# Patient Record
Sex: MALE | Race: WHITE | Employment: OTHER | URBAN - METROPOLITAN AREA
[De-identification: names, ages, dates, MRNs, and addresses within clinical notes are randomized per-mention and may not be internally consistent; named-entity substitution may affect disease eponyms.]

---

## 2017-07-23 ENCOUNTER — APPOINTMENT (OUTPATIENT)
Dept: ULTRASOUND IMAGING | Age: 62
DRG: 445 | End: 2017-07-23
Attending: PHYSICIAN ASSISTANT
Payer: COMMERCIAL

## 2017-07-23 ENCOUNTER — HOSPITAL ENCOUNTER (INPATIENT)
Age: 62
LOS: 3 days | Discharge: HOME | DRG: 445 | End: 2017-07-26
Attending: EMERGENCY MEDICINE | Admitting: INTERNAL MEDICINE
Payer: COMMERCIAL

## 2017-07-23 DIAGNOSIS — E80.6 HYPERBILIRUBINEMIA: ICD-10-CM

## 2017-07-23 DIAGNOSIS — K82.8 GALLBLADDER SLUDGE: Primary | ICD-10-CM

## 2017-07-23 PROBLEM — K83.1 OBSTRUCTIVE JAUNDICE: Status: ACTIVE | Noted: 2017-07-23

## 2017-07-23 LAB
ALBUMIN SERPL BCP-MCNC: 3.2 G/DL (ref 3.5–5)
ALBUMIN/GLOB SERPL: 1 {RATIO} (ref 1.1–2.2)
ALP SERPL-CCNC: 375 U/L (ref 45–117)
ALT SERPL-CCNC: 199 U/L (ref 12–78)
AMMONIA PLAS-SCNC: 12 UMOL/L
ANION GAP BLD CALC-SCNC: 11 MMOL/L (ref 5–15)
APPEARANCE UR: ABNORMAL
AST SERPL W P-5'-P-CCNC: 65 U/L (ref 15–37)
BACTERIA URNS QL MICRO: ABNORMAL /HPF
BASOPHILS # BLD AUTO: 0 K/UL (ref 0–0.1)
BASOPHILS # BLD: 1 % (ref 0–1)
BILIRUB SERPL-MCNC: 14 MG/DL (ref 0.2–1)
BILIRUB UR QL CFM: POSITIVE
BUN SERPL-MCNC: 16 MG/DL (ref 6–20)
BUN/CREAT SERPL: 15 (ref 12–20)
CALCIUM SERPL-MCNC: 8.5 MG/DL (ref 8.5–10.1)
CHLORIDE SERPL-SCNC: 101 MMOL/L (ref 97–108)
CO2 SERPL-SCNC: 25 MMOL/L (ref 21–32)
COLOR UR: ABNORMAL
CREAT SERPL-MCNC: 1.09 MG/DL (ref 0.7–1.3)
EOSINOPHIL # BLD: 0.4 K/UL (ref 0–0.4)
EOSINOPHIL NFR BLD: 6 % (ref 0–7)
EPITH CASTS URNS QL MICRO: ABNORMAL /LPF
ERYTHROCYTE [DISTWIDTH] IN BLOOD BY AUTOMATED COUNT: 14.8 % (ref 11.5–14.5)
GLOBULIN SER CALC-MCNC: 3.3 G/DL (ref 2–4)
GLUCOSE SERPL-MCNC: 126 MG/DL (ref 65–100)
GLUCOSE UR STRIP.AUTO-MCNC: NEGATIVE MG/DL
HCT VFR BLD AUTO: 46.7 % (ref 36.6–50.3)
HGB BLD-MCNC: 16.3 G/DL (ref 12.1–17)
HGB UR QL STRIP: ABNORMAL
KETONES UR QL STRIP.AUTO: ABNORMAL MG/DL
LEUKOCYTE ESTERASE UR QL STRIP.AUTO: ABNORMAL
LIPASE SERPL-CCNC: 251 U/L (ref 73–393)
LYMPHOCYTES # BLD AUTO: 14 % (ref 12–49)
LYMPHOCYTES # BLD: 1 K/UL (ref 0.8–3.5)
MCH RBC QN AUTO: 32.1 PG (ref 26–34)
MCHC RBC AUTO-ENTMCNC: 34.9 G/DL (ref 30–36.5)
MCV RBC AUTO: 91.9 FL (ref 80–99)
MONOCYTES # BLD: 0.7 K/UL (ref 0–1)
MONOCYTES NFR BLD AUTO: 10 % (ref 5–13)
NEUTS SEG # BLD: 4.7 K/UL (ref 1.8–8)
NEUTS SEG NFR BLD AUTO: 69 % (ref 32–75)
NITRITE UR QL STRIP.AUTO: NEGATIVE
PH UR STRIP: 5.5 [PH] (ref 5–8)
PLATELET # BLD AUTO: 174 K/UL (ref 150–400)
POTASSIUM SERPL-SCNC: 3.4 MMOL/L (ref 3.5–5.1)
PROT SERPL-MCNC: 6.5 G/DL (ref 6.4–8.2)
PROT UR STRIP-MCNC: ABNORMAL MG/DL
RBC # BLD AUTO: 5.08 M/UL (ref 4.1–5.7)
RBC #/AREA URNS HPF: ABNORMAL /HPF (ref 0–5)
SODIUM SERPL-SCNC: 137 MMOL/L (ref 136–145)
SP GR UR REFRACTOMETRY: 1.02 (ref 1–1.03)
UA: UC IF INDICATED,UAUC: ABNORMAL
UROBILINOGEN UR QL STRIP.AUTO: 1 EU/DL (ref 0.2–1)
WBC # BLD AUTO: 6.8 K/UL (ref 4.1–11.1)
WBC URNS QL MICRO: ABNORMAL /HPF (ref 0–4)

## 2017-07-23 PROCEDURE — 81001 URINALYSIS AUTO W/SCOPE: CPT | Performed by: PHYSICIAN ASSISTANT

## 2017-07-23 PROCEDURE — 65270000029 HC RM PRIVATE

## 2017-07-23 PROCEDURE — 74011000250 HC RX REV CODE- 250: Performed by: PHYSICIAN ASSISTANT

## 2017-07-23 PROCEDURE — 99285 EMERGENCY DEPT VISIT HI MDM: CPT

## 2017-07-23 PROCEDURE — 96361 HYDRATE IV INFUSION ADD-ON: CPT

## 2017-07-23 PROCEDURE — 74011250636 HC RX REV CODE- 250/636: Performed by: PHYSICIAN ASSISTANT

## 2017-07-23 PROCEDURE — 83690 ASSAY OF LIPASE: CPT | Performed by: PHYSICIAN ASSISTANT

## 2017-07-23 PROCEDURE — 36415 COLL VENOUS BLD VENIPUNCTURE: CPT | Performed by: PHYSICIAN ASSISTANT

## 2017-07-23 PROCEDURE — 76700 US EXAM ABDOM COMPLETE: CPT

## 2017-07-23 PROCEDURE — 80053 COMPREHEN METABOLIC PANEL: CPT | Performed by: PHYSICIAN ASSISTANT

## 2017-07-23 PROCEDURE — 87086 URINE CULTURE/COLONY COUNT: CPT | Performed by: PHYSICIAN ASSISTANT

## 2017-07-23 PROCEDURE — 96374 THER/PROPH/DIAG INJ IV PUSH: CPT

## 2017-07-23 PROCEDURE — 82140 ASSAY OF AMMONIA: CPT | Performed by: PHYSICIAN ASSISTANT

## 2017-07-23 PROCEDURE — 85025 COMPLETE CBC W/AUTO DIFF WBC: CPT | Performed by: PHYSICIAN ASSISTANT

## 2017-07-23 PROCEDURE — 74011250636 HC RX REV CODE- 250/636: Performed by: INTERNAL MEDICINE

## 2017-07-23 RX ORDER — LEVOFLOXACIN 5 MG/ML
750 INJECTION, SOLUTION INTRAVENOUS ONCE
Status: COMPLETED | OUTPATIENT
Start: 2017-07-23 | End: 2017-07-24

## 2017-07-23 RX ORDER — GLUCOSAMINE SULFATE 1500 MG
5000 POWDER IN PACKET (EA) ORAL DAILY
COMMUNITY
End: 2018-07-17

## 2017-07-23 RX ORDER — DIPHENHYDRAMINE HYDROCHLORIDE 50 MG/ML
25 INJECTION, SOLUTION INTRAMUSCULAR; INTRAVENOUS
Status: COMPLETED | OUTPATIENT
Start: 2017-07-23 | End: 2017-07-23

## 2017-07-23 RX ORDER — CIPROFLOXACIN 2 MG/ML
400 INJECTION, SOLUTION INTRAVENOUS
Status: DISCONTINUED | OUTPATIENT
Start: 2017-07-23 | End: 2017-07-23

## 2017-07-23 RX ORDER — RAMIPRIL 10 MG/1
10 CAPSULE ORAL DAILY
COMMUNITY
End: 2017-08-31

## 2017-07-23 RX ORDER — METRONIDAZOLE 500 MG/100ML
500 INJECTION, SOLUTION INTRAVENOUS
Status: COMPLETED | OUTPATIENT
Start: 2017-07-23 | End: 2017-07-23

## 2017-07-23 RX ORDER — DIPHENHYDRAMINE HYDROCHLORIDE 50 MG/ML
25 INJECTION, SOLUTION INTRAMUSCULAR; INTRAVENOUS
Status: DISCONTINUED | OUTPATIENT
Start: 2017-07-23 | End: 2017-07-26 | Stop reason: HOSPADM

## 2017-07-23 RX ORDER — GABAPENTIN 300 MG/1
900 CAPSULE ORAL 3 TIMES DAILY
Status: ON HOLD | COMMUNITY
End: 2017-07-24 | Stop reason: DRUGHIGH

## 2017-07-23 RX ORDER — ONDANSETRON 2 MG/ML
4 INJECTION INTRAMUSCULAR; INTRAVENOUS
Status: DISCONTINUED | OUTPATIENT
Start: 2017-07-23 | End: 2017-07-26 | Stop reason: HOSPADM

## 2017-07-23 RX ORDER — LEVOFLOXACIN 5 MG/ML
750 INJECTION, SOLUTION INTRAVENOUS EVERY 24 HOURS
Status: DISCONTINUED | OUTPATIENT
Start: 2017-07-24 | End: 2017-07-24

## 2017-07-23 RX ORDER — CYANOCOBALAMIN 1000 UG/ML
1000 INJECTION, SOLUTION INTRAMUSCULAR; SUBCUTANEOUS ONCE
COMMUNITY
End: 2017-10-26 | Stop reason: SDUPTHER

## 2017-07-23 RX ORDER — LEVOFLOXACIN 5 MG/ML
500 INJECTION, SOLUTION INTRAVENOUS EVERY 24 HOURS
Status: DISCONTINUED | OUTPATIENT
Start: 2017-07-24 | End: 2017-07-23

## 2017-07-23 RX ORDER — LEVOFLOXACIN 500 MG/1
500 TABLET, FILM COATED ORAL DAILY
COMMUNITY
End: 2017-07-26

## 2017-07-23 RX ORDER — CETIRIZINE HCL 10 MG
10 TABLET ORAL DAILY
Status: DISCONTINUED | OUTPATIENT
Start: 2017-07-24 | End: 2017-07-26 | Stop reason: HOSPADM

## 2017-07-23 RX ORDER — SODIUM CHLORIDE AND POTASSIUM CHLORIDE .9; .15 G/100ML; G/100ML
SOLUTION INTRAVENOUS CONTINUOUS
Status: DISCONTINUED | OUTPATIENT
Start: 2017-07-23 | End: 2017-07-26

## 2017-07-23 RX ADMIN — METRONIDAZOLE 500 MG: 500 INJECTION, SOLUTION INTRAVENOUS at 22:18

## 2017-07-23 RX ADMIN — DIPHENHYDRAMINE HYDROCHLORIDE 25 MG: 50 INJECTION, SOLUTION INTRAMUSCULAR; INTRAVENOUS at 23:18

## 2017-07-23 RX ADMIN — SODIUM CHLORIDE 1000 ML: 900 INJECTION, SOLUTION INTRAVENOUS at 18:39

## 2017-07-23 RX ADMIN — DIPHENHYDRAMINE HYDROCHLORIDE 25 MG: 50 INJECTION, SOLUTION INTRAMUSCULAR; INTRAVENOUS at 18:53

## 2017-07-23 NOTE — IP AVS SNAPSHOT
Summary of Care Report The Summary of Care report has been created to help improve care coordination. Users with access to [employer] or [employer] [address] (Web-based application) may access additional patient information including the Discharge Summary. If you are not currently a [address] user and need more information, please call the number listed below in the [place] [address] section and ask to be connected with Medical Records. Facility Information Name Address Phone [address] [address] [phone] Patient Information Patient Name Sex  Vera Aguilar ([id number]) Male 1955 Discharge Information Admitting Provider Service Area Unit Mercedes Rush MD / [phone] [address] [facility] 2 General Surgery / [phone] Discharge Provider Discharge Date/Time Discharge Disposition Destination (none) 2017 (Pending) AHR (none) Patient Language Language ENGLISH [13] Hospital Problems as of 2017  Reviewed: 2017  3:00 PM by Obi Peña MD  
  
  
  
 Class Noted - Resolved Last Modified POA Active Problems Obstructive jaundice  2017 - Present 2017 by Mercedes Rush MD Yes Entered by Mercedes Rush MD  
  Common bile duct (CBD) obstruction  2017 - Present 2017 by Vargas Barbosa MD Yes Entered by Vargas Barbosa MD  
  UTI (urinary tract infection)  2017 - Present 2017 by Vargas Barbosa MD Clinically Undetermined Entered by Vargas Barbosa MD  
  
Non-Hospital Problems as of 2017  Reviewed: 2017  3:00 PM by Obi Peña MD  
 None You are allergic to the following Allergen Reactions Pcn (Penicillins) Other (comments) Pt stated \"always been told PCN\" Current Discharge Medication List  
  
 START taking these medications Dose & Instructions Dispensing Information Comments  
 hydrOXYzine HCl 50 mg tablet Commonly known as:  ATARAX Dose:  50 mg Take 1 Tab by mouth three (3) times daily as needed for Itching for up to 10 days. Quantity:  30 Tab Refills:  0 CONTINUE these medications which have CHANGED Dose & Instructions Dispensing Information Comments  
 gabapentin 300 mg capsule Commonly known as:  NEURONTIN What changed:  Another medication with the same name was removed. Continue taking this medication, and follow the directions you see here. Dose:  900 mg Take 900 mg by mouth two (2) times a day. 3 x 300mg caps (900mg) twice daily Refills:  0 CONTINUE these medications which have NOT CHANGED Dose & Instructions Dispensing Information Comments  
 ramipril 10 mg capsule Commonly known as:  ALTACE Dose:  10 mg Take 10 mg by mouth daily. Refills:  0  
   
 VITAMIN B-12 1,000 mcg/mL injection Generic drug:  cyanocobalamin Dose:  1000 mcg  
1,000 mcg by IntraMUSCular route once. Indications: VITAMIN B12 DEFICIENCY, Twice a month Refills:  0  
   
 VITAMIN D3 1,000 unit Cap Generic drug:  cholecalciferol Dose:  1000 Units Take 1,000 Units by mouth daily. Refills:  0 STOP taking these medications Comments  
 levoFLOXacin 500 mg tablet Commonly known as:  [facility] Information ID Date/Time Status Primary Surgeon All Procedures Location [id number] 7/25/2017 Filemon Arriaza [address] Sloane Thomson MD ENDOSCOPIC RETROGRADE CHOLANGIOPANCREATOGRAPHY, BALLOON DILATATION,SPINCTEROTOMY, CYTOLOGY BRUSHINGS OF COMMON BILE DUCT, COMMON BILE DUCT STENT INSERTION [facility] MAIN OR Follow-up Information Follow up With Details Comments Contact Info Coy Sparrow MD In 1 week LFT recheck and biopsy results [address] [place] [address] 
[phone] Discharge Instructions HOSPITALIST DISCHARGE INSTRUCTIONS 
 
NAME: Anjum Monreal :  1955 MRN:  [id number] Date/Time:  2017 12:02 PM 
 
ADMIT DATE: 2017 DISCHARGE DATE: 2017 DISCHARGE DIAGNOSIS: 
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 
S/P ablation for Afib Sjogren's disease Hyponatremia- mild, Eat some salt in diet for next few days then go back to salt restricted diet 
  
 
Active Problems: 
  Obstructive jaundice (2017) Common bile duct (CBD) obstruction (2017) UTI (urinary tract infection) (2017) MEDICATIONS: 
As per medication reconciliation  list 
· It is important that you take the medication exactly as they are prescribed. · Keep your medication in the bottles provided by the pharmacist and keep a list of the medication names, dosages, and times to be taken in your wallet. · Do not take other medications without consulting your doctor. Pain Management: per above medications What to do at [facility] Recommended diet:  Cardiac Diet, Eat some salt in diet for next few days then go back to salt restricted diet Recommended activity: Activity as tolerated If you have questions regarding the hospital related prescriptions or hospital related issues please call Can Nogueira at . If you experience any of the following symptoms then please call your primary care physician or return to the emergency room if you cannot get hold of your doctor: 
Fever, chills, nausea, vomiting, diarrhea, change in mentation, falling, bleeding, shortness of breath, Follow Up: 
Dr Greyson Echeverria (Gastroenterologist) in 1 week for repeat LFTs & results of Biopsy as recommended Information obtained by : 
I understand that if any problems occur once I am at home I am to contact my physician. I understand and acknowledge receipt of the instructions indicated above. Physician's or R.N.'s Signature                                                                  Date/Time Patient or Representative Signature                                                          Date/Time Chart Review Routing History No Routing History on File

## 2017-07-23 NOTE — ED NOTES
Patient itching and red, raised areas of erythema present on arm where tourniquet was placed. PA aware. Benadryl ordered.

## 2017-07-23 NOTE — ED PROVIDER NOTES
HPI Comments: Haydee Gonsalez is a 62 y.o. male with PMHx significant for sjogren's who presents ambulatory to [facility] ED with cc of acute onset jaundiced skin x this morning. Pt reports an episode of abdominal pain about a month ago, which he treated with OTC analgesics and TUMS with relief. Pt reports his abdominal pain completely resolved but his urine had an orange tinge to it and his BM were light brown and loose. Pt did go get evaluated by his PCP the day after his symptoms onset and was diagnosed with a cold. No labs or UA was done at that time. Pt went to see his PCP again 2 days ago and diagnosed with a UTI after a UA. He was discharged with a prescription for Levaquin which he has been taking as instructed. Pt was scheduled to follow up with his PCP tomorrow but wife brought him into the ED for his jaundiced skin. Pt's wife reports he has a hx of Sjogren's and has been taking benadryl x week. Pt notes he is usually on zyrtec for his Sjogren's. Pt has been having BM's everyday. Pt specifically denies any nausea, vomiting, abdominal pain, dysuria, hematuria, urinary frequency. PCP: No primary care provider on file. Social Hx: - tobacco (-), -EtOH (-), - illicit drugs (-). There are no other complaints, changes or physical findings at this time. The history is provided by the patient. No  was used. No past medical history on file. No past surgical history on file. No family history on file. Social History     Social History    Marital status: N/A     Spouse name: N/A    Number of children: N/A    Years of education: N/A     Occupational History    Not on file.      Social History Main Topics    Smoking status: Not on file    Smokeless tobacco: Not on file    Alcohol use Not on file    Drug use: Not on file    Sexual activity: Not on file     Other Topics Concern    Not on file     Social History Narrative         ALLERGIES: Pcn [penicillins]    Review of Systems   Constitutional: Negative. HENT: Negative. Respiratory: Negative. Cardiovascular: Negative. Gastrointestinal: Negative for abdominal pain, nausea and vomiting. Positive for loose light brown stool. Genitourinary: Negative for dysuria, frequency and hematuria. Positive for different color urine   Musculoskeletal: Negative. Skin: Positive for color change (jaundiced). Neurological: Negative. All other systems reviewed and are negative. Patient Vitals for the past 12 hrs:   Temp Pulse Resp BP SpO2   07/23/17 2000 - - - 132/77 96 %   07/23/17 1946 - - - - 96 %   07/23/17 1945 - - - 142/81 -   07/23/17 1930 - - - 128/77 96 %   07/23/17 1900 - - - 134/72 95 %   07/23/17 1845 - - - 131/84 97 %   07/23/17 1750 98 °F (36.7 °C) 87 16 (!) 165/96 97 %     Physical Exam   Constitutional: He is oriented to person, place, and time. Vital signs are normal. He appears well-developed and well-nourished. No distress. Jaundiced appearing 63 yo  male   HENT:   Head: Normocephalic and atraumatic. Eyes: Conjunctivae are normal. Right eye exhibits no discharge. Left eye exhibits no discharge. Scleral icterus is present. Neck: Normal range of motion. Neck supple. Cardiovascular: Normal rate, regular rhythm and normal heart sounds. No murmur heard. Pulmonary/Chest: Effort normal and breath sounds normal. No respiratory distress. He has no wheezes. Abdominal: Soft. Normal appearance and bowel sounds are normal. He exhibits no distension. There is tenderness in the right upper quadrant. There is no rebound and no guarding. Neurological: He is alert and oriented to person, place, and time. Skin: Skin is warm and dry. He is not diaphoretic. Psychiatric: He has a normal mood and affect. His behavior is normal.   Nursing note and vitals reviewed.        MDM  Number of Diagnoses or Management Options  Diagnosis management comments: DDx: Pancreatitis, Hepatitis, Gall bladder disease, Gall stones, Liver failure, UTI. Amount and/or Complexity of Data Reviewed  Clinical lab tests: ordered and reviewed  Review and summarize past medical records: yes    Patient Progress  Patient progress: stable    ED Course       Procedures    PROGRESS NOTE:  8:35 PM  Updated pt on his lab results and let him know his Bilirubin was positive. Pt still declines any pain medication at this point. Written by Josey Henderson ED Scribe, as dictated by Camacho Jacobo .    PROGRESS NOTE:  9:30 PM  Since the pt Is allergic to penicillin Dr Claribel Jordan recommended giving Cipro and flagyl and to proceed with general surgery and hospitalist consults. Written by Josey Henderson, RADHA Scribe, as dictated by Camacho Jacobo .    CONSULT NOTE:   9:37 PM  NOBLE Garcia  spoke with Dr. Neli Brady,   Specialty: General surgery  Discussed pt's hx, disposition, and available diagnostic and imaging results. Reviewed care plans. Consultant agrees with plans as outlined. He recommends admitting the pt to the hospitalist and having them consult GI prior to having the pt go into the OR. Written by Josey Henderson ED Scribe, as dictated by Camacho Jacobo.    CONSULT NOTE:   9:39 PM  NOBLE Garcia  spoke with Dr. Tia Vergara,   Specialty: Hospitalist  Discussed pt's hx, disposition, and available diagnostic and imaging results. Reviewed care plans. Consultant will evaluate pt for admission.   Written by RADHA Maxibe, as dictated by Camacho Jacobo.    LABORATORY TESTS:  Recent Results (from the past 12 hour(s))   URINALYSIS W/ REFLEX CULTURE    Collection Time: 07/23/17  6:39 PM   Result Value Ref Range    Color DARK YELLOW      Appearance CLOUDY (A) CLEAR      Specific gravity 1.021 1.003 - 1.030      pH (UA) 5.5 5.0 - 8.0      Protein TRACE (A) NEG mg/dL    Glucose NEGATIVE  NEG mg/dL    Ketone TRACE (A) NEG mg/dL    Blood LARGE (A) NEG      Urobilinogen 1.0 0.2 - 1.0 EU/dL    Nitrites NEGATIVE  NEG      Leukocyte Esterase SMALL (A) NEG      WBC 0-4 0 - 4 /hpf    RBC 5-10 0 - 5 /hpf    Epithelial cells FEW FEW /lpf    Bacteria 1+ (A) NEG /hpf    UA:UC IF INDICATED URINE CULTURE ORDERED (A) CNI     CBC WITH AUTOMATED DIFF    Collection Time: 07/23/17  6:39 PM   Result Value Ref Range    WBC 6.8 4.1 - 11.1 K/uL    RBC 5.08 4.10 - 5.70 M/uL    HGB 16.3 12.1 - 17.0 g/dL    HCT 46.7 36.6 - 50.3 %    MCV 91.9 80.0 - 99.0 FL    MCH 32.1 26.0 - 34.0 PG    MCHC 34.9 30.0 - 36.5 g/dL    RDW 14.8 (H) 11.5 - 14.5 %    PLATELET 007 957 - 056 K/uL    NEUTROPHILS 69 32 - 75 %    LYMPHOCYTES 14 12 - 49 %    MONOCYTES 10 5 - 13 %    EOSINOPHILS 6 0 - 7 %    BASOPHILS 1 0 - 1 %    ABS. NEUTROPHILS 4.7 1.8 - 8.0 K/UL    ABS. LYMPHOCYTES 1.0 0.8 - 3.5 K/UL    ABS. MONOCYTES 0.7 0.0 - 1.0 K/UL    ABS. EOSINOPHILS 0.4 0.0 - 0.4 K/UL    ABS. BASOPHILS 0.0 0.0 - 0.1 K/UL   METABOLIC PANEL, COMPREHENSIVE    Collection Time: 07/23/17  6:39 PM   Result Value Ref Range    Sodium 137 136 - 145 mmol/L    Potassium 3.4 (L) 3.5 - 5.1 mmol/L    Chloride 101 97 - 108 mmol/L    CO2 25 21 - 32 mmol/L    Anion gap 11 5 - 15 mmol/L    Glucose 126 (H) 65 - 100 mg/dL    BUN 16 6 - 20 MG/DL    Creatinine 1.09 0.70 - 1.30 MG/DL    BUN/Creatinine ratio 15 12 - 20      GFR est AA >60 >60 ml/min/1.73m2    GFR est non-AA >60 >60 ml/min/1.73m2    Calcium 8.5 8.5 - 10.1 MG/DL    Bilirubin, total 14.0 (H) 0.2 - 1.0 MG/DL    ALT (SGPT) 199 (H) 12 - 78 U/L    AST (SGOT) 65 (H) 15 - 37 U/L    Alk.  phosphatase 375 (H) 45 - 117 U/L    Protein, total 6.5 6.4 - 8.2 g/dL    Albumin 3.2 (L) 3.5 - 5.0 g/dL    Globulin 3.3 2.0 - 4.0 g/dL    A-G Ratio 1.0 (L) 1.1 - 2.2     LIPASE    Collection Time: 07/23/17  6:39 PM   Result Value Ref Range    Lipase 251 73 - 393 U/L   BILIRUBIN, CONFIRM    Collection Time: 07/23/17  6:39 PM   Result Value Ref Range    Bilirubin UA, confirm POSITIVE (A) NEG         IMAGING RESULTS:  US ABD COMP   Final Result   Study Result      ULTRASOUND OF ABDOMEN, 7/23/2017 9:09 PM  INDICATION: elevated liver enzymes; abdominal pain. .  COMPARISON: None  . TECHNIQUE: Multiplanar real-time ultrasonography of the abdomen using gray-scale  imaging, supplemented by color and spectral Doppler. Kristin Mcdonald FINDINGS:  Liver: Normal contour without visible focal lesions. The liver echotexture is  normal.  Antegrade flow in the portal vein with normal waveform. Gallbladder: Sludge and/or tiny stones in the gallbladder. The gallbladder  appears distended. No wall thickening or pericholecystic fluid collections. No  sonographic Dhaliwal's sign. Biliary: No intra- or extra-hepatic ductal dilatation. Common bile duct measures  1.6 cm. Pancreas: The partially visualized pancreas is unremarkable. Kidneys: Normal contour and echogenicity without stones, perinephric fluid, or  hydronephrosis. Exophytic, hypoechoic/anechoic lesion in the lower pole of the  left kidney that measures approximately 2 cm in diameter Right kidney measures  11.6 cm. Left kidney measures 12.6 cm. Spleen: Normal echo texture without focal lesions. Maximal dimension = 13 cm. Peritoneum: No masses or ascites. Vascular: The aorta, including bifurcation, and IVC are unremarkable. Kristin Nehawka IMPRESSION  IMPRESSION:    1. Distended common bile duct. There is sludge and/or tiny stones in the  gallbladder which also appears distended. 2. Cystic lesion in the left kidney measuring approximately 2 cm in diameter            MEDICATIONS GIVEN:  Medications   metroNIDAZOLE (FLAGYL) IVPB premix 500 mg (not administered)   levoFLOXacin (LEVAQUIN) 750 mg in D5W IVPB (not administered)   sodium chloride 0.9 % bolus infusion 1,000 mL (1,000 mL IntraVENous New Bag 7/23/17 3020)   diphenhydrAMINE (BENADRYL) injection 25 mg (25 mg IntraVENous Given 7/23/17 3926)       IMPRESSION:  1. Gallbladder sludge    2.  Hyperbilirubinemia        PLAN: Admit     ADMIT NOTE:  9:40 PM  Patient is being admitted to the hospital by Dr. Lalo Sandhu. The results of their tests and reasons for their admission have been discussed with them and/or available family. They convey agreement and understanding for the need to be admitted and for their admission diagnosis. Consultation has been made with the inpatient physician specialist for hospitalization. This note is prepared by Jackie Cummings acting as Scribe for Mile Philip: The scribe's documentation has been prepared under my direction and personally reviewed by me in its entirety. I confirm that the note above accurately reflects all work, treatment, procedures, and medical decision making performed by me.

## 2017-07-23 NOTE — ED NOTES
Assumed care of patient from triage. Patient reports new onset jaundice beginning yesterday. Patient states he has been on Levaquin for the past month for a \"life-threatening bladder infection (per previous MD)\", and is skilled nursing through his course of treatment. Patient states over the past month he has been experiencing epigastric pain, decreased appetite, and loose stools that he describes as \"mashed potato color. \" Patient is alert and oriented x4, respirations even and unlabored, vital signs stable. Monitor x2, call bell within reach.

## 2017-07-23 NOTE — IP AVS SNAPSHOT
[address] [facility] 
[phone] Patient: Km Strauss MRN: [id number] HSD:[id number] Current Discharge Medication List  
  
START taking these medications Dose & Instructions Dispensing Information Comments Morning Noon Evening Bedtime  
 hydrOXYzine HCl 50 mg tablet Commonly known as:  ATARAX Your last dose was: Your next dose is:    
   
   
 Dose:  50 mg Take 1 Tab by mouth three (3) times daily as needed for Itching for up to 10 days. Quantity:  30 Tab Refills:  0 CONTINUE these medications which have CHANGED Dose & Instructions Dispensing Information Comments Morning Noon Evening Bedtime  
 gabapentin 300 mg capsule Commonly known as:  NEURONTIN What changed:  Another medication with the same name was removed. Continue taking this medication, and follow the directions you see here. Your last dose was: Your next dose is:    
   
   
 Dose:  900 mg Take 900 mg by mouth two (2) times a day. 3 x 300mg caps (900mg) twice daily Refills:  0 CONTINUE these medications which have NOT CHANGED Dose & Instructions Dispensing Information Comments Morning Noon Evening Bedtime  
 ramipril 10 mg capsule Commonly known as:  ALTACE Your last dose was: Your next dose is:    
   
   
 Dose:  10 mg Take 10 mg by mouth daily. Refills:  0  
     
   
   
   
  
 VITAMIN B-12 1,000 mcg/mL injection Generic drug:  cyanocobalamin Your last dose was: Your next dose is:    
   
   
 Dose:  1000 mcg  
1,000 mcg by IntraMUSCular route once. Indications: VITAMIN B12 DEFICIENCY, Twice a month Refills:  0  
     
   
   
   
  
 VITAMIN D3 1,000 unit Cap Generic drug:  cholecalciferol Your last dose was: Your next dose is:    
   
   
 Dose:  1000 Units Take 1,000 Units by mouth daily. Refills:  0 STOP taking these medications   
 levoFLOXacin 500 mg tablet Commonly known as:  Tamica Yang Where to Get Your Medications Information on where to get these meds will be given to you by the nurse or doctor. ! Ask your nurse or doctor about these medications  
  hydrOXYzine HCl 50 mg tablet

## 2017-07-23 NOTE — IP AVS SNAPSHOT
[address] [facility] 
[phone] Patient: Nasreen Garcia MRN: [id number] HGH:[id number] You are allergic to the following Allergen Reactions Pcn (Penicillins) Other (comments) Pt stated \"always been told PCN\" Recent Documentation Height Weight BMI Smoking Status 1.727 m 89 kg 29.83 kg/m2 Never Smoker Emergency Contacts Name Discharge Info Relation Home Work Mobile Bee Cristina  Spouse [3] [phone] About your hospitalization You were admitted on:  July 23, 2017 You last received care in the:  [facility] 2 GENERAL SURGERY You were discharged on:  July 26, 2017 Unit phone number:  [phone] Why you were hospitalized Your primary diagnosis was:  Not on File Your diagnoses also included:  Obstructive Jaundice, Common Bile Duct (Cbd) Obstruction, Uti (Urinary Tract Infection) Providers Seen During Your Hospitalizations Provider Role Specialty Primary office phone Arnoldo Warren DO Attending Provider Emergency Medicine [phone] Karen Lester MD Attending Provider Internal Medicine [phone] Imani Urena MD Attending Provider Internal Medicine [phone] Your Primary Care Physician (PCP) Primary Care Physician Office Phone Office Fax NONE ** None ** ** None ** Follow-up Information Follow up With Details Comments Contact Info Ramírez Ramirez MD In 1 week LFT recheck and biopsy results [address] [facility] 
[phone] Your Appointments Monday August 21, 2017  9:30 AM EDT PHYSICAL PRE OP with [address],  [facility] [address] [facility]  
[phone] Current Discharge Medication List  
  
START taking these medications Dose & Instructions Dispensing Information Comments Morning Noon Evening Bedtime  
 hydrOXYzine HCl 50 mg tablet Commonly known as:  ATARAX Your last dose was: Your next dose is:    
   
   
 Dose:  50 mg Take 1 Tab by mouth three (3) times daily as needed for Itching for up to 10 days. Quantity:  30 Tab Refills:  0 CONTINUE these medications which have CHANGED Dose & Instructions Dispensing Information Comments Morning Noon Evening Bedtime  
 gabapentin 300 mg capsule Commonly known as:  NEURONTIN What changed:  Another medication with the same name was removed. Continue taking this medication, and follow the directions you see here. Your last dose was: Your next dose is:    
   
   
 Dose:  900 mg Take 900 mg by mouth two (2) times a day. 3 x 300mg caps (900mg) twice daily Refills:  0 CONTINUE these medications which have NOT CHANGED Dose & Instructions Dispensing Information Comments Morning Noon Evening Bedtime  
 ramipril 10 mg capsule Commonly known as:  ALTACE Your last dose was: Your next dose is:    
   
   
 Dose:  10 mg Take 10 mg by mouth daily. Refills:  0  
     
   
   
   
  
 VITAMIN B-12 1,000 mcg/mL injection Generic drug:  cyanocobalamin Your last dose was: Your next dose is:    
   
   
 Dose:  1000 mcg  
1,000 mcg by IntraMUSCular route once. Indications: VITAMIN B12 DEFICIENCY, Twice a month Refills:  0  
     
   
   
   
  
 VITAMIN D3 1,000 unit Cap Generic drug:  cholecalciferol Your last dose was: Your next dose is:    
   
   
 Dose:  1000 Units Take 1,000 Units by mouth daily. Refills:  0 STOP taking these medications   
 levoFLOXacin 500 mg tablet Commonly known as:  Josey Lieu Where to Get Your Medications Information on where to get these meds will be given to you by the nurse or doctor. ! Ask your nurse or doctor about these medications  
  hydrOXYzine HCl 50 mg tablet Discharge Instructions HOSPITALIST DISCHARGE INSTRUCTIONS 
 
NAME: Elliott Patel :  1955 MRN:  [id number] Date/Time:  2017 12:02 PM 
 
ADMIT DATE: 2017 DISCHARGE DATE: 2017 DISCHARGE DIAGNOSIS: 
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 
S/P ablation for Afib Sjogren's disease Hyponatremia- mild, Eat some salt in diet for next few days then go back to salt restricted diet 
  
 
Active Problems: 
  Obstructive jaundice (2017) Common bile duct (CBD) obstruction (2017) UTI (urinary tract infection) (2017) MEDICATIONS: 
As per medication reconciliation  list 
· It is important that you take the medication exactly as they are prescribed. · Keep your medication in the bottles provided by the pharmacist and keep a list of the medication names, dosages, and times to be taken in your wallet. · Do not take other medications without consulting your doctor. Pain Management: per above medications What to do at [facility] Recommended diet:  Cardiac Diet, Eat some salt in diet for next few days then go back to salt restricted diet Recommended activity: Activity as tolerated If you have questions regarding the hospital related prescriptions or hospital related issues please call Can Nogueira at . If you experience any of the following symptoms then please call your primary care physician or return to the emergency room if you cannot get hold of your doctor: Fever, chills, nausea, vomiting, diarrhea, change in mentation, falling, bleeding, shortness of breath, Follow Up: 
Dr Sixto Ernst (Gastroenterologist) in 1 week for repeat LFTs & results of Biopsy as recommended Information obtained by : 
I understand that if any problems occur once I am at home I am to contact my physician. I understand and acknowledge receipt of the instructions indicated above. Physician's or R.N.'s Signature                                                                  Date/Time Patient or Representative Signature                                                          Date/Time Discharge Orders None AskYou Announcement We are excited to announce that we are making your provider's discharge notes available to you in AskYou. You will see these notes when they are completed and signed by the physician that discharged you from your recent hospital stay. If you have any questions or concerns about any information you see in AskYou, please call the Health Information Department where you were seen or reach out to your Primary Care Provider for more information about your plan of care. Introducing [facility] [place] [facility]! [facility] introduces AskYou patient portal. Now you can access parts of your medical record, email your doctor's office, and request medication refills online. 1. In your internet browser, go to [email or web address] 2. Click on the First Time User? Click Here link in the Sign In box. You will see the New Member Sign Up page. 3. Enter your AskYou Access Code exactly as it appears below.  You will not need to use this code after youve completed the sign-up process. If you do not sign up before the expiration date, you must request a new code. · Zbird Access Code: [email or web address] Expires: 10/24/2017 12:17 PM 
 
4. Enter the last four digits of your Social Security Number (xxxx) and Date of Birth (mm/dd/yyyy) as indicated and click Submit. You will be taken to the next sign-up page. 5. Create a Zbird ID. This will be your Zbird login ID and cannot be changed, so think of one that is secure and easy to remember. 6. Create a Zbird password. You can change your password at any time. 7. Enter your Password Reset Question and Answer. This can be used at a later time if you forget your password. 8. Enter your e-mail address. You will receive e-mail notification when new information is available in [address] 9. Click Sign Up. You can now view and download portions of your medical record. 10. Click the Download Summary menu link to download a portable copy of your medical information. If you have questions, please visit the Frequently Asked Questions section of the Zbird website. Remember, Zbird is NOT to be used for urgent needs. For medical emergencies, dial 911. Now available from your iPhone and Android! General Information Please provide this summary of care documentation to your next provider. Patient Signature:  ____________________________________________________________ Date:  ____________________________________________________________  
  
Vicente Cart Provider Signature:  ____________________________________________________________ Date:  ____________________________________________________________

## 2017-07-24 ENCOUNTER — APPOINTMENT (OUTPATIENT)
Dept: CT IMAGING | Age: 62
DRG: 445 | End: 2017-07-24
Attending: PHYSICIAN ASSISTANT
Payer: COMMERCIAL

## 2017-07-24 LAB
ALBUMIN SERPL BCP-MCNC: 2.8 G/DL (ref 3.5–5)
ALBUMIN/GLOB SERPL: 1 {RATIO} (ref 1.1–2.2)
ALP SERPL-CCNC: 330 U/L (ref 45–117)
ALT SERPL-CCNC: 163 U/L (ref 12–78)
ANION GAP BLD CALC-SCNC: 8 MMOL/L (ref 5–15)
AST SERPL W P-5'-P-CCNC: 56 U/L (ref 15–37)
BASOPHILS # BLD AUTO: 0 K/UL (ref 0–0.1)
BASOPHILS # BLD: 1 % (ref 0–1)
BILIRUB SERPL-MCNC: 12.4 MG/DL (ref 0.2–1)
BUN SERPL-MCNC: 14 MG/DL (ref 6–20)
BUN/CREAT SERPL: 17 (ref 12–20)
CALCIUM SERPL-MCNC: 8 MG/DL (ref 8.5–10.1)
CHLORIDE SERPL-SCNC: 105 MMOL/L (ref 97–108)
CO2 SERPL-SCNC: 23 MMOL/L (ref 21–32)
CREAT SERPL-MCNC: 0.82 MG/DL (ref 0.7–1.3)
EOSINOPHIL # BLD: 0.4 K/UL (ref 0–0.4)
EOSINOPHIL NFR BLD: 6 % (ref 0–7)
ERYTHROCYTE [DISTWIDTH] IN BLOOD BY AUTOMATED COUNT: 15.3 % (ref 11.5–14.5)
GLOBULIN SER CALC-MCNC: 2.8 G/DL (ref 2–4)
GLUCOSE SERPL-MCNC: 116 MG/DL (ref 65–100)
HCT VFR BLD AUTO: 44.3 % (ref 36.6–50.3)
HGB BLD-MCNC: 14.8 G/DL (ref 12.1–17)
LIPASE SERPL-CCNC: 202 U/L (ref 73–393)
LYMPHOCYTES # BLD AUTO: 19 % (ref 12–49)
LYMPHOCYTES # BLD: 1.2 K/UL (ref 0.8–3.5)
MAGNESIUM SERPL-MCNC: 2.3 MG/DL (ref 1.6–2.4)
MCH RBC QN AUTO: 30.6 PG (ref 26–34)
MCHC RBC AUTO-ENTMCNC: 33.4 G/DL (ref 30–36.5)
MCV RBC AUTO: 91.7 FL (ref 80–99)
MONOCYTES # BLD: 0.3 K/UL (ref 0–1)
MONOCYTES NFR BLD AUTO: 6 % (ref 5–13)
NEUTS SEG # BLD: 4.3 K/UL (ref 1.8–8)
NEUTS SEG NFR BLD AUTO: 68 % (ref 32–75)
PLATELET # BLD AUTO: 160 K/UL (ref 150–400)
POTASSIUM SERPL-SCNC: 3.4 MMOL/L (ref 3.5–5.1)
PROT SERPL-MCNC: 5.6 G/DL (ref 6.4–8.2)
RBC # BLD AUTO: 4.83 M/UL (ref 4.1–5.7)
SODIUM SERPL-SCNC: 136 MMOL/L (ref 136–145)
WBC # BLD AUTO: 6.2 K/UL (ref 4.1–11.1)

## 2017-07-24 PROCEDURE — 74011000258 HC RX REV CODE- 258: Performed by: INTERNAL MEDICINE

## 2017-07-24 PROCEDURE — 80053 COMPREHEN METABOLIC PANEL: CPT | Performed by: INTERNAL MEDICINE

## 2017-07-24 PROCEDURE — 36415 COLL VENOUS BLD VENIPUNCTURE: CPT | Performed by: INTERNAL MEDICINE

## 2017-07-24 PROCEDURE — 74011250636 HC RX REV CODE- 250/636: Performed by: INTERNAL MEDICINE

## 2017-07-24 PROCEDURE — 85025 COMPLETE CBC W/AUTO DIFF WBC: CPT | Performed by: INTERNAL MEDICINE

## 2017-07-24 PROCEDURE — 74011250637 HC RX REV CODE- 250/637: Performed by: INTERNAL MEDICINE

## 2017-07-24 PROCEDURE — 74011636320 HC RX REV CODE- 636/320: Performed by: INTERNAL MEDICINE

## 2017-07-24 PROCEDURE — 83690 ASSAY OF LIPASE: CPT | Performed by: INTERNAL MEDICINE

## 2017-07-24 PROCEDURE — 74160 CT ABDOMEN W/CONTRAST: CPT

## 2017-07-24 PROCEDURE — 74011000255 HC RX REV CODE- 255: Performed by: INTERNAL MEDICINE

## 2017-07-24 PROCEDURE — 83735 ASSAY OF MAGNESIUM: CPT | Performed by: INTERNAL MEDICINE

## 2017-07-24 PROCEDURE — 74011250636 HC RX REV CODE- 250/636: Performed by: PHYSICIAN ASSISTANT

## 2017-07-24 PROCEDURE — 65270000029 HC RM PRIVATE

## 2017-07-24 RX ORDER — SODIUM CHLORIDE 9 MG/ML
50 INJECTION, SOLUTION INTRAVENOUS
Status: COMPLETED | OUTPATIENT
Start: 2017-07-24 | End: 2017-07-24

## 2017-07-24 RX ORDER — BARIUM SULFATE 20 MG/ML
900 SUSPENSION ORAL
Status: COMPLETED | OUTPATIENT
Start: 2017-07-24 | End: 2017-07-24

## 2017-07-24 RX ORDER — GABAPENTIN 300 MG/1
900 CAPSULE ORAL 2 TIMES DAILY
COMMUNITY
End: 2017-10-26 | Stop reason: SDUPTHER

## 2017-07-24 RX ORDER — MELATONIN
5000 DAILY
Status: DISCONTINUED | OUTPATIENT
Start: 2017-07-24 | End: 2017-07-26 | Stop reason: HOSPADM

## 2017-07-24 RX ORDER — GABAPENTIN 300 MG/1
900 CAPSULE ORAL
Status: DISCONTINUED | OUTPATIENT
Start: 2017-07-24 | End: 2017-07-24 | Stop reason: DRUGHIGH

## 2017-07-24 RX ORDER — SODIUM CHLORIDE 0.9 % (FLUSH) 0.9 %
10 SYRINGE (ML) INJECTION
Status: COMPLETED | OUTPATIENT
Start: 2017-07-24 | End: 2017-07-24

## 2017-07-24 RX ORDER — RAMIPRIL 5 MG/1
10 CAPSULE ORAL DAILY
Status: DISCONTINUED | OUTPATIENT
Start: 2017-07-24 | End: 2017-07-26 | Stop reason: HOSPADM

## 2017-07-24 RX ORDER — GABAPENTIN 300 MG/1
900 CAPSULE ORAL ONCE
Status: DISCONTINUED | OUTPATIENT
Start: 2017-07-24 | End: 2017-07-24 | Stop reason: SDUPTHER

## 2017-07-24 RX ORDER — GABAPENTIN 300 MG/1
900 CAPSULE ORAL 2 TIMES DAILY
Status: DISCONTINUED | OUTPATIENT
Start: 2017-07-24 | End: 2017-07-26 | Stop reason: HOSPADM

## 2017-07-24 RX ADMIN — SODIUM CHLORIDE AND POTASSIUM CHLORIDE: 9; 1.49 INJECTION, SOLUTION INTRAVENOUS at 00:54

## 2017-07-24 RX ADMIN — Medication 10 ML: at 13:38

## 2017-07-24 RX ADMIN — DIPHENHYDRAMINE HYDROCHLORIDE 25 MG: 50 INJECTION, SOLUTION INTRAMUSCULAR; INTRAVENOUS at 07:02

## 2017-07-24 RX ADMIN — GABAPENTIN 900 MG: 300 CAPSULE ORAL at 14:52

## 2017-07-24 RX ADMIN — BARIUM SULFATE 900 ML: 21 SUSPENSION ORAL at 11:26

## 2017-07-24 RX ADMIN — RAMIPRIL 10 MG: 5 CAPSULE ORAL at 14:52

## 2017-07-24 RX ADMIN — GABAPENTIN 900 MG: 300 CAPSULE ORAL at 21:30

## 2017-07-24 RX ADMIN — CHOLECALCIFEROL TAB 25 MCG (1000 UNIT) 5000 UNITS: 25 TAB at 14:52

## 2017-07-24 RX ADMIN — CEFTRIAXONE 1 G: 1 INJECTION, POWDER, FOR SOLUTION INTRAMUSCULAR; INTRAVENOUS at 18:39

## 2017-07-24 RX ADMIN — IOPAMIDOL 100 ML: 755 INJECTION, SOLUTION INTRAVENOUS at 13:38

## 2017-07-24 RX ADMIN — SODIUM CHLORIDE AND POTASSIUM CHLORIDE: 9; 1.49 INJECTION, SOLUTION INTRAVENOUS at 12:39

## 2017-07-24 RX ADMIN — DIPHENHYDRAMINE HYDROCHLORIDE 25 MG: 50 INJECTION, SOLUTION INTRAMUSCULAR; INTRAVENOUS at 21:31

## 2017-07-24 RX ADMIN — LEVOFLOXACIN 750 MG: 5 INJECTION, SOLUTION INTRAVENOUS at 00:53

## 2017-07-24 RX ADMIN — CETIRIZINE HYDROCHLORIDE 10 MG: 10 TABLET, FILM COATED ORAL at 14:52

## 2017-07-24 RX ADMIN — SODIUM CHLORIDE 50 ML/HR: 900 INJECTION, SOLUTION INTRAVENOUS at 13:38

## 2017-07-24 RX ADMIN — DIPHENHYDRAMINE HYDROCHLORIDE 25 MG: 50 INJECTION, SOLUTION INTRAMUSCULAR; INTRAVENOUS at 15:01

## 2017-07-24 NOTE — CONSULTS
Called by ER physician and told that patient has a dilated CBD and elevated LFT but no evidence of acute cholecystitis. Suggested that GI needs to evaluate this patient. Consult is placed just because ER physician spoke to me on phone. Will sign off.

## 2017-07-24 NOTE — ED NOTES
TRANSFER - OUT REPORT:    Verbal report given to Arnulfo Del Toro RN (name) on Anjali Christianson  being transferred to General Surgery (unit) for routine progression of care       Report consisted of patients Situation, Background, Assessment and   Recommendations(SBAR). Information from the following report(s) SBAR, Kardex, ED Summary, MAR, Accordion, Recent Results and Med Rec Status was reviewed with the receiving nurse. Lines:   Peripheral IV 07/23/17 Right Antecubital (Active)   Site Assessment Clean, dry, & intact 7/23/2017  6:46 PM   Phlebitis Assessment 0 7/23/2017  6:46 PM   Infiltration Assessment 0 7/23/2017  6:46 PM   Dressing Status Clean, dry, & intact 7/23/2017  6:46 PM   Dressing Type Transparent;Tape 7/23/2017  6:46 PM   Hub Color/Line Status Patent; Flushed; Infusing;Pink 7/23/2017  6:46 PM   Action Taken Blood drawn 7/23/2017  6:46 PM        Opportunity for questions and clarification was provided.

## 2017-07-24 NOTE — PROGRESS NOTES
MRI    MRI screening sheet needs to completed and signed    Please call [phone] when this is done

## 2017-07-24 NOTE — PROGRESS NOTES
Bedside and Verbal shift change report given to Sophie Bonilla (oncoming nurse) by Terry (offgoing nurse). Report included the following information SBAR, Kardex and Recent Results     Pt NPO at midnight, tolerating clears well, \"feeling better! \". Urine tea colored at beginning of shift, now straw colored. No c/o pain or n/v. Annamary Ripa

## 2017-07-24 NOTE — CONSULTS
Gastroenterology Consult     Referring Physician: Dr. Karina Mooney Date: 7/24/2017     Subjective:     Chief Complaint: jaundice    History of Present Illness: Shaun Sow is a 62 y.o. male who is seen in consultation for jaundice. He presented to ER yesterday with c/o jaundice. A month ago he awoke in the middle of the night with severe epigastric pain. It felt like a hot poker. It did not radiate to the back. It lasted several hours and then resolved. No further pain since. After the episode, however, his stool and urine color changed. His stool has been pale and his urine has been the color of ice tea. He's had pruritus for the past 3 weeks. He's had unplanned weight loss of 15 lbs in the past month. He is a non smoker and rarely drinks ETOH. No FH of liver, pancreatic or gallbladder disease. Bilirubin is 14  Ultrasound shows stones and sludge in the gallbladder. Wall is not thickened and no fluid. CBD measures 16mm.     Past Medical History:   Diagnosis Date    Autoimmune disease ([address]     Hypertension     Ill-defined condition     Previous a.fib, ablation, NSR now     Past Surgical History:   Procedure Laterality Date    CARDIAC SURG PROCEDURE UNLIST      Ablation 2005    HX ORTHOPAEDIC      Spinal fusion     NEUROLOGICAL PROCEDURE UNLISTED      Ting hole washout from cerebral hemorrhage      Family History   Problem Relation Age of Onset    Cancer Father      Breast and Colon     Social History   Substance Use Topics    Smoking status: Never Smoker    Smokeless tobacco: Not on file    Alcohol use No      Allergies   Allergen Reactions    Pcn [Penicillins] Other (comments)     Pt stated \"always been told PCN\"     Current Facility-Administered Medications   Medication Dose Route Frequency    cholecalciferol (VITAMIN D3) tablet 5,000 Units  5,000 Units Oral DAILY    ramipril (ALTACE) capsule 10 mg  10 mg Oral DAILY    gabapentin (NEURONTIN) capsule 900 mg  900 mg Oral BID    0.9% sodium chloride with KCl 20 mEq/L infusion   IntraVENous CONTINUOUS    ondansetron (ZOFRAN) injection 4 mg  4 mg IntraVENous Q4H PRN    diphenhydrAMINE (BENADRYL) injection 25 mg  25 mg IntraVENous Q6H PRN    cetirizine (ZYRTEC) tablet 10 mg  10 mg Oral DAILY    levoFLOXacin (LEVAQUIN) 750 mg in D5W IVPB  750 mg IntraVENous Q24H        Review of Systems:  A detailed 10 organ review of systems is obtained with pertinent positives as listed in the History of Present Illness and Past Medical History. A detailed review of systems was performed as follows:  Constitutional:  Negative  Eyes:  No ocular sensitivity to the sun, blurred vision or double vision. ENMT:  No nose or sinus problems. Respiratory: No coughing, wheezing or sob  Cardiac:  No chest pain, exertional chest pain or palpitations  Gastrointestinal:  See history of the present illness  :   +dark urine  Musculoskeletal:  No arthritis or hot swollen joints. Endocrine:  No thyroid disease or diabetes  Psychiatric: No depression or feeling blue  Integumentary:  +jaundice and pruritus  Neurologic:  Hx of hemorrhagic stroke when he was taking coumadin  Heme-Lymphatic:  No history of anemia, no unexplained lumps or bumps      Objective:     Physical Exam:  Visit Vitals    BP (!) 141/91 (BP 1 Location: Left arm, BP Patient Position: Supine; At rest)    Pulse 70    Temp 98.3 °F (36.8 °C)    Resp 18    Ht 5' 8\" (1.727 m)    Wt 89 kg (196 lb 3.4 oz)    SpO2 97%    BMI 29.83 kg/m2        Gen: WDWN  Skin:  Tan skin with yellow tint of abd  HEENT: Sclerae are icteric. No abnormal pigmentation of the lips. The neck is supple. Cardiovascular: Regular rate and rhythm. No murmurs, gallops, or rubs. Respiratory:  Comfortable breathing with no accessory muscle use. Clear breath sounds with no wheezes, rales, or rhonchi. GI:  Abdomen nondistended, soft, and nontender. Normal active bowel sounds. No enlargement of the liver or spleen.  No masses palpable. Rectal:  Deferred  Musculoskeletal:  No pitting edema of the lower legs. Neurological:  Gross memory appears intact. Patient is alert and oriented. Psychiatric:  Mood appears appropriate with judgement intact. Lymphatic:  No cervical or supraclavicular adenopathy. Lab/Data Review:  Lab Results   Component Value Date/Time    WBC 6.2 07/24/2017 04:16 AM    HGB 14.8 07/24/2017 04:16 AM    HCT 44.3 07/24/2017 04:16 AM    PLATELET 071 14/59/2612 04:16 AM    MCV 91.7 07/24/2017 04:16 AM     Lab Results   Component Value Date/Time    Sodium 136 07/24/2017 04:16 AM    Potassium 3.4 07/24/2017 04:16 AM    Chloride 105 07/24/2017 04:16 AM    CO2 23 07/24/2017 04:16 AM    Anion gap 8 07/24/2017 04:16 AM    Glucose 116 07/24/2017 04:16 AM    BUN 14 07/24/2017 04:16 AM    Creatinine 0.82 07/24/2017 04:16 AM    BUN/Creatinine ratio 17 07/24/2017 04:16 AM    GFR est AA >60 07/24/2017 04:16 AM    GFR est non-AA >60 07/24/2017 04:16 AM    Calcium 8.0 07/24/2017 04:16 AM    Bilirubin, total 12.4 07/24/2017 04:16 AM    AST (SGOT) 56 07/24/2017 04:16 AM    Alk. phosphatase 330 07/24/2017 04:16 AM    Protein, total 5.6 07/24/2017 04:16 AM    Albumin 2.8 07/24/2017 04:16 AM    Globulin 2.8 07/24/2017 04:16 AM    A-G Ratio 1.0 07/24/2017 04:16 AM    ALT (SGPT) 163 07/24/2017 04:16 AM     US Results (most recent):    Results from [place] encounter on 07/23/17   US ABD COMP   Narrative ULTRASOUND OF ABDOMEN, 7/23/2017 9:09 PM  INDICATION: elevated liver enzymes; abdominal pain. .  COMPARISON: None  . TECHNIQUE: Multiplanar real-time ultrasonography of the abdomen using gray-scale  imaging, supplemented by color and spectral Doppler. Christy Daily FINDINGS:  Liver: Normal contour without visible focal lesions. The liver echotexture is  normal.  Antegrade flow in the portal vein with normal waveform. Gallbladder: Sludge and/or tiny stones in the gallbladder. The gallbladder  appears distended.  No wall thickening or pericholecystic fluid collections. No  sonographic Dhaliwal's sign. Biliary: No intra- or extra-hepatic ductal dilatation. Common bile duct measures  1.6 cm. Pancreas: The partially visualized pancreas is unremarkable. Kidneys: Normal contour and echogenicity without stones, perinephric fluid, or  hydronephrosis. Exophytic, hypoechoic/anechoic lesion in the lower pole of the  left kidney that measures approximately 2 cm in diameter Right kidney measures  11.6 cm. Left kidney measures 12.6 cm. Spleen: Normal echo texture without focal lesions. Maximal dimension = 13 cm. Peritoneum: No masses or ascites. Vascular: The aorta, including bifurcation, and IVC are unremarkable. .       Impression IMPRESSION:    1. Distended common bile duct. There is sludge and/or tiny stones in the  gallbladder which also appears distended. 2. Cystic lesion in the left kidney measuring approximately 2 cm in diameter              Assessment/Plan:     Active Problems:    Obstructive jaundice (7/23/2017)         Patient may have a stone or a soft tissue mass blocking his bile duct and causing the jaundice. It sounds like he had a gallbladder attack about a month ago and he has evidence of gallstones on U/S. He may have lodged a stone in his bile duct. However, he is no longer symptomatic and has had unplanned weight loss. We need to rule out malignancy. I called the MRI department and they feel his hardware in his lower back from prior surgery is MRI compatible. Therefore, we will proceed with an MRCP to further evaluate. If he has a stone, then he will need an ERCP. However, if there is a soft tissue mass in the bile duct, then he will likely need an EUS with biopsy. FEMI Nance  07/24/17  10:18 AM      Addendum: Patient's wife who is a nurse recalls his back surgeon telling him he could never have an MRI due to the type of metal they used. Therefore, we will cancel the MRI.  I will order CT with contrast to evaluate further.     FEMI Duncan  07/24/17  11:05 AM

## 2017-07-24 NOTE — PROGRESS NOTES
Pharmacy Automatic Renal Dosing Protocol - Antimicrobials      Indication for Antimicrobials: UTI  Current Regimen of Each Antimicrobial (Start Day & Day of Therapy):  Levaquin 500 mg iv q24h    Significant cultures: Urine culture       CAPD, Intermittent Hemodialysis or Renal Replacement Therapy: no  Paralysis, amputations, malnutrition: no  Recent Labs      17   1839   CREA  1.09   BUN  16   WBC  6.8     Temp (24hrs), Av °F (36.7 °C), Min:98 °F (36.7 °C), Max:98 °F (36.7 °C)    Creatinine Clearance (ml/min): 69.7      Impression/Plan: Levaquin dosing adjusted to 750 mg iv q24h as per protocol Guillermo Raya, Maude1 Oscar ARIAS will follow daily and adjust doses of monitored medications as appropriate for renal function and/or serum levels. Thank you,  Guillermo Raya, PHARMD           Loading dose entered? Yes   Daily BMP ordered? Yes   Maintenance dose entered? Yes   Previous order discontinued? Yes   Progress note entered? Yes   Serum Level(s) ordered? Not applicable       Renal Dosing Tables on the Pharmacy Web Site                Pharmacist will perform automatic renal dosage adjustment for certain medications   Antimicrobials: acyclovir, aminoglycosides, amoxicillin, amoxicillin/clavulanate, ampicillin, ampicillin/sulbactam, aztreonam, cefazolin, cefepime, cefotetan, ceftazidime, ciprofloxacin, colistin, daptomycin, doripenem, ertapenem,  ethambutol, fluconazole, imipenem/cilastatin, levofloxacin, linezolid, meropenem, oseltamivir, piperacillin/tazobactam, pyrazinamide, valacyclovir, vancomycin   Creatinine Clearance will be calculated using the Cockroft-Gault equation, using the lessor of ideal or actual body weight.  Monitoring will continue daily for any medication that is changed and appropriate adjustments will be made as needed for the duration of therapy.    Upon initiation of renal dosing or when a dosage adjustment is made, the pharmacist will write a progress note with the indication, calculated creatinine clearance, and dosing regimen. The pharmacist will use per protocol when changing orders.

## 2017-07-24 NOTE — PROGRESS NOTES
Hospitalist Progress Note    NAME: Anjali Christianson   :  1955   MRN:  [id number]       Assessment / Plan:  Obstructive Jaundice POA  Dilated CBD / GB sludge/tiny stone  Concern for malignancy vs choledocholithiasis  Continue NPO, IVF  Unable to get MRI due to metal in the spine per patient  Awaiting CT A/P with contrast  Will need ERCP with stone extraction vs EUS depending on CT findings  No signs of infection at this time so no abx to cover this at this time  Spoke to GI who showed concern with recently started levaquin with ? Culprit. 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  There is sludge and/or tiny stones in the gallbladder which also appears distended. Sludge and/or tiny stones in the gallbladder. The gallbladder appears distended. No wall thickening or pericholecystic fluid collections. No  sonographic Dhaliwal's sign. Recent UTI   Patient was told that he had a \"life threatening UTI\" a few days ago. Repeat UA with just 1+ bacteria. Switch Levaquin to IV Rocephin as above       HTN  Continue altace     S/P ablation for Afib  Only on ASA which we are holding for now. No longer on coumadin or any rate controlling agent. No longer follows with cardiology.       Sjogren's disease  Not on meds     Hives / urticaria  Continue zyrtec with IV benadryl prn      Code Status:   Full  Surrogate Decision Maker: wife     DVT Prophylaxis:   SCD. Avoiding lovenox for anticipated procedures   GI Prophylaxis: not indicated             Subjective: Pt seen and examined at bedside. NAD, denies any pain.  Overnight events d/w RN   CHIEF COMPLAINT:  f/u \"Jaundice\"     Review of Systems:  Symptom Y/N Comments  Symptom Y/N Comments   Fever/Chills n   Chest Pain n    Poor Appetite    Edema     Cough n   Abdominal Pain y    Sputum    Joint Pain     SOB/VIVEROS n   Pruritis/Rash     Nausea/vomit n   Tolerating PT/OT     Diarrhea    Tolerating Diet     Constipation    Other       Could NOT obtain due to:      Objective:     VITALS:   Last 24hrs VS reviewed since prior progress note. Most recent are:  Patient Vitals for the past 24 hrs:   Temp Pulse Resp BP SpO2   07/24/17 1105 98.4 °F (36.9 °C) 68 18 138/81 98 %   07/24/17 0825 98.3 °F (36.8 °C) 70 18 (!) 141/91 97 %   07/24/17 0343 98.3 °F (36.8 °C) 71 16 142/90 96 %   07/24/17 0048 98.2 °F (36.8 °C) 75 16 149/84 97 %   07/23/17 2330 - - - 139/79 95 %   07/23/17 2300 - - - 114/66 95 %   07/23/17 2230 - - - 138/81 96 %   07/23/17 2200 - - - 133/84 96 %   07/23/17 2030 - - - 130/82 97 %   07/23/17 2000 - - - 132/77 96 %   07/23/17 1946 - - - - 96 %   07/23/17 1945 - - - 142/81 -   07/23/17 1930 - - - 128/77 96 %   07/23/17 1900 - - - 134/72 95 %   07/23/17 1845 - - - 131/84 97 %   07/23/17 1750 98 °F (36.7 °C) 87 16 (!) 165/96 97 %       Intake/Output Summary (Last 24 hours) at 07/24/17 1501  Last data filed at 07/24/17 1241   Gross per 24 hour   Intake                0 ml   Output             1300 ml   Net            -1300 ml        PHYSICAL EXAM:  General: WD, WN. Alert, cooperative, no acute distress    EENT:  EOMI. Anicteric sclerae. MMM  Resp:  CTA bilaterally, no wheezing or rales. No accessory muscle use  CV:  Regular  rhythm,  No edema  GI:  Soft, Non distended, Non tender.  +Bowel sounds  Neurologic:  Alert and oriented X 3, normal speech,   Psych:   Good insight. Not anxious nor agitated  Skin:  No rashes.   No jaundice    Reviewed most current lab test results and cultures  YES  Reviewed most current radiology test results   YES  Review and summation of old records today    NO  Reviewed patient's current orders and MAR    YES  PMH/SH reviewed - no change compared to H&P  ________________________________________________________________________  Care Plan discussed with:    Comments   Patient y    Family      RN y    Care Manager     Consultant  y GI                     Multidiciplinary team rounds were held today with , nursing, pharmacist and clinical coordinator. Patient's plan of care was discussed; medications were reviewed and discharge planning was addressed. ________________________________________________________________________  Total NON critical care TIME:  35  Minutes    Total CRITICAL CARE TIME Spent:   Minutes non procedure based      Comments   >50% of visit spent in counseling and coordination of care     ________________________________________________________________________  Carolina Garces MD     Procedures: see electronic medical records for all procedures/Xrays and details which were not copied into this note but were reviewed prior to creation of Plan. LABS:  I reviewed today's most current labs and imaging studies.   Pertinent labs include:  Recent Labs      07/24/17 0416  07/23/17   1839   WBC  6.2  6.8   HGB  14.8  16.3   HCT  44.3  46.7   PLT  160  174     Recent Labs      07/24/17   0416  07/23/17   1839   NA  136  137   K  3.4*  3.4*   CL  105  101   CO2  23  25   GLU  116*  126*   BUN  14  16   CREA  0.82  1.09   CA  8.0*  8.5   MG  2.3   --    ALB  2.8*  3.2*   TBILI  12.4*  14.0*   SGOT  56*  65*   ALT  163*  199*       Signed: Carolina Garces MD

## 2017-07-24 NOTE — PROGRESS NOTES
Primary Nurse Jonathan Cerda RN and Ernst Leroy RN performed a dual skin assessment on this patient. No pressure ulcers noted. Multiple scratch marks noted on 4 extremities and anterior and posterior body due to itching.   Jeet score is 22

## 2017-07-24 NOTE — H&P
Hospitalist Admission Note    NAME: Frannie Downing   :  1955   MRN:  [id number]     Date/Time:  2017 9:57 PM    Patient PCP: None  ________________________________________________________________________    My assessment of this patient's clinical condition and my plan of care is as follows. Assessment / Plan:    Obstructive jaundice  Dilated CBD / GB sludge  -US abdomen   Distended common bile duct 1.6cm. There is sludge and/or tiny stones in the gallbladder which also appears distended. Sludge and/or tiny stones in the gallbladder. The gallbladder appears distended. No wall thickening or pericholecystic fluid collections. No  sonographic Dhaliwal's sign.   -no fever, leukocytosis or pain. No clinical evidence for cholangitis or cholecystitis. No indication for antibiotics    Recent UTI   -Patient was told that he had a \"life threatening UTI\" a few days ago. Repeat UA with just 1+ bacteria. Will continue levaquin but change to IV. HTN  -continue altace    S/P ablation for Afib  -only on ASA which I will hold for now. No longer on coumadin or any rate controlling agent. No longer follows with cardiology. Sjogren's disease  -not on meds    Hives / urticaria  -continue zyrtec with IV benadryl prn        Code Status:   Full  Surrogate Decision Maker: wife    DVT Prophylaxis:   SCD. Avoiding lovenox for anticipated procedures   GI Prophylaxis: not indicated            Subjective:   CHIEF COMPLAINT:   Yellow jaundice    HISTORY OF PRESENT ILLNESS:     Frannie Downing is a 62 y.o.  male with a history of Sjogren's, HTN and Afib who presents with new onset jaundice. Patient reports an episode of abdominal pain about a month ago which lasted about 2 hours and resolved completely. He reports seeing his PCP and was diagnosed with URI. Since then, he has noticed his urine turned darker and his stools lighter in color.    The intensity of the color change fluctuated but never resolved. He saw his PCP 2 days ago and was started on Levaquin for a UTI. Today his skin looked yellow so he and his wife decided to come to the ER. Work up demonstrated Total Bili 14 and US abd : Distended common bile duct 1.6cm. There is sludge and/or tiny stones in the gallbladder which also appears distended. Sludge and/or tiny stones in the gallbladder. The gallbladder appears distended. No wall thickening or pericholecystic fluid collections. No sonographic Dhaliwal's sign. We were asked to admit for work up and evaluation of the above problems. Past Medical History:   Diagnosis Date    Autoimmune disease ([address]     Hypertension     Ill-defined condition     Previous a.fib, ablation, NSR now        Past Surgical History:   Procedure Laterality Date    CARDIAC SURG PROCEDURE UNLIST      Ablation 2005    HX ORTHOPAEDIC      Spinal fusion     NEUROLOGICAL PROCEDURE UNLISTED      Watt Ethel hole washout from cerebral hemorrhage       Social History   Substance Use Topics    Smoking status: Never Smoker    Smokeless tobacco: Not on file    Alcohol use No        Family History   Problem Relation Age of Onset    Cancer Father      Breast and Colon     Allergies   Allergen Reactions    Pcn [Penicillins] Other (comments)     Pt stated \"always been told PCN\"        Prior to Admission medications    Medication Sig Start Date End Date Taking? Authorizing Provider   levoFLOXacin (LEVAQUIN) 500 mg tablet Take 500 mg by mouth daily. Yes Flynn Mohan MD   ramipril (ALTACE) 10 mg capsule Take 10 mg by mouth daily. Yes Flynn Mohan MD   gabapentin (NEURONTIN) 300 mg capsule Take 900 mg by mouth three (3) times daily. Yes Flynn Mohan MD   cholecalciferol (VITAMIN D3) 1,000 unit cap Take 1,000 Units by mouth daily. Yes Flynn Mohan MD   cyanocobalamin (VITAMIN B-12) 1,000 mcg/mL injection 1,000 mcg by IntraMUSCular route once.  Indications: VITAMIN B12 DEFICIENCY, Twice a month   Yes Phys Other, MD       REVIEW OF SYSTEMS:       Total of 12 systems reviewed as follows:       POSITIVE= underlined text  Negative = text not underlined  General:  fever, chills, sweats, generalized weakness, weight loss/gain,      loss of appetite   Eyes:    blurred vision, eye pain, loss of vision, double vision  ENT:    rhinorrhea, pharyngitis   Respiratory:   cough, sputum production, SOB, VIVEROS, wheezing, pleuritic pain   Cardiology:   chest pain, palpitations, orthopnea, PND, edema, syncope   Gastrointestinal:  abdominal pain , N/V, diarrhea, dysphagia, constipation, bleeding   Genitourinary:  frequency, urgency, dysuria, hematuria, incontinence   Muskuloskeletal :  arthralgia, myalgia, back pain  Hematology:  easy bruising, nose or gum bleeding, lymphadenopathy   Dermatological: rash, ulceration, pruritis, color change / jaundice  Endocrine:   hot flashes or polydipsia   Neurological:  headache, dizziness, confusion, focal weakness, paresthesia,     Speech difficulties, memory loss, gait difficulty  Psychological: Feelings of anxiety, depression, agitation    Objective:   VITALS:    Visit Vitals    /81    Pulse 87    Temp 98 °F (36.7 °C)    Resp 16    Ht 5' 8\" (1.727 m)    Wt 89 kg (196 lb 3.4 oz)    SpO2 96%    BMI 29.83 kg/m2       PHYSICAL EXAM:    General:    Alert, cooperative, no distress, appears stated age. HEENT: Atraumatic, (+) icteric sclerae, pink conjunctivae     No oral ulcers, mucosa moist, throat clear, dentition fair  Neck:  Supple, symmetrical,  thyroid: non tender  Lungs:   Clear to auscultation bilaterally. No Wheezing or Rhonchi. No rales. Chest wall:  No tenderness  No Accessory muscle use. Heart:   Regular  rhythm,  No  murmur   No edema  Abdomen:   Soft, non-tender. Not distended. Bowel sounds normal  Extremities: No cyanosis. No clubbing, Skin turgor normal, Capillary refill normal, Radial dial pulse 2+  Skin:     Not pale.   (+) Jaundiced  (+) multiple excoriations extremities   Psych:  Good insight. Not depressed. Not anxious or agitated. Neurologic: EOMs intact. No facial asymmetry. No aphasia or slurred speech. Symmetrical strength, Sensation grossly intact. Alert and oriented X 4.     _______________________________________________________________________  Care Plan discussed with:    Comments   Patient x    Family      RN     Care Manager                    Consultant:      _______________________________________________________________________  Expected  Disposition:   Home with Family x   HH/PT/OT/RN    SNF/LTC    MARÍA ELENA    ________________________________________________________________________  TOTAL TIME:  54  Minutes    Critical Care Provided     Minutes non procedure based      Comments    x Reviewed previous records   >50% of visit spent in counseling and coordination of care  Discussion with patient and/or family and questions answered       Given the patient's current clinical presentation, I have a high level of concern for decompensation if discharged from the ED. Complex decision making was performed which includes reviewing the patient's available past medical records, laboratory results, and Xray films. I have also directly communicated my plan and discussed this case with the involved ED physician.     ____________________________________________________________________  Raúl Pichardo MD    Procedures: see electronic medical records for all procedures/Xrays and details which were not copied into this note but were reviewed prior to creation of Plan.     LAB DATA REVIEWED:    Recent Results (from the past 24 hour(s))   URINALYSIS W/ REFLEX CULTURE    Collection Time: 07/23/17  6:39 PM   Result Value Ref Range    Color DARK YELLOW      Appearance CLOUDY (A) CLEAR      Specific gravity 1.021 1.003 - 1.030      pH (UA) 5.5 5.0 - 8.0      Protein TRACE (A) NEG mg/dL    Glucose NEGATIVE  NEG mg/dL    Ketone TRACE (A) NEG mg/dL    Blood LARGE (A) NEG Urobilinogen 1.0 0.2 - 1.0 EU/dL    Nitrites NEGATIVE  NEG      Leukocyte Esterase SMALL (A) NEG      WBC 0-4 0 - 4 /hpf    RBC 5-10 0 - 5 /hpf    Epithelial cells FEW FEW /lpf    Bacteria 1+ (A) NEG /hpf    UA:UC IF INDICATED URINE CULTURE ORDERED (A) CNI     CBC WITH AUTOMATED DIFF    Collection Time: 07/23/17  6:39 PM   Result Value Ref Range    WBC 6.8 4.1 - 11.1 K/uL    RBC 5.08 4.10 - 5.70 M/uL    HGB 16.3 12.1 - 17.0 g/dL    HCT 46.7 36.6 - 50.3 %    MCV 91.9 80.0 - 99.0 FL    MCH 32.1 26.0 - 34.0 PG    MCHC 34.9 30.0 - 36.5 g/dL    RDW 14.8 (H) 11.5 - 14.5 %    PLATELET 160 601 - 001 K/uL    NEUTROPHILS 69 32 - 75 %    LYMPHOCYTES 14 12 - 49 %    MONOCYTES 10 5 - 13 %    EOSINOPHILS 6 0 - 7 %    BASOPHILS 1 0 - 1 %    ABS. NEUTROPHILS 4.7 1.8 - 8.0 K/UL    ABS. LYMPHOCYTES 1.0 0.8 - 3.5 K/UL    ABS. MONOCYTES 0.7 0.0 - 1.0 K/UL    ABS. EOSINOPHILS 0.4 0.0 - 0.4 K/UL    ABS. BASOPHILS 0.0 0.0 - 0.1 K/UL   METABOLIC PANEL, COMPREHENSIVE    Collection Time: 07/23/17  6:39 PM   Result Value Ref Range    Sodium 137 136 - 145 mmol/L    Potassium 3.4 (L) 3.5 - 5.1 mmol/L    Chloride 101 97 - 108 mmol/L    CO2 25 21 - 32 mmol/L    Anion gap 11 5 - 15 mmol/L    Glucose 126 (H) 65 - 100 mg/dL    BUN 16 6 - 20 MG/DL    Creatinine 1.09 0.70 - 1.30 MG/DL    BUN/Creatinine ratio 15 12 - 20      GFR est AA >60 >60 ml/min/1.73m2    GFR est non-AA >60 >60 ml/min/1.73m2    Calcium 8.5 8.5 - 10.1 MG/DL    Bilirubin, total 14.0 (H) 0.2 - 1.0 MG/DL    ALT (SGPT) 199 (H) 12 - 78 U/L    AST (SGOT) 65 (H) 15 - 37 U/L    Alk.  phosphatase 375 (H) 45 - 117 U/L    Protein, total 6.5 6.4 - 8.2 g/dL    Albumin 3.2 (L) 3.5 - 5.0 g/dL    Globulin 3.3 2.0 - 4.0 g/dL    A-G Ratio 1.0 (L) 1.1 - 2.2     LIPASE    Collection Time: 07/23/17  6:39 PM   Result Value Ref Range    Lipase 251 73 - 393 U/L   BILIRUBIN, CONFIRM    Collection Time: 07/23/17  6:39 PM   Result Value Ref Range    Bilirubin UA, confirm POSITIVE (A) NEG     AMMONIA Collection Time: 07/23/17  9:16 PM   Result Value Ref Range    Ammonia 12 <32 UMOL/L

## 2017-07-24 NOTE — PROGRESS NOTES
End of Shift Nursing Note    Bedside shift change report given to Ilia Hodge 15. (oncoming nurse) by Blas Be (offgoing nurse). Report included the following information SBAR, Kardex, ED Summary, Intake/Output, MAR and Recent Results. Zone Phone:   [phone]    Significant changes during shift:    none   Non-emergent issues for physician to address:   none     Number times ambulated in hallway past shift:       Number of times OOB to chair past shift:     POD #:      Vital Signs:    Temp: 98.3 °F (36.8 °C)     Pulse (Heart Rate): 71     BP: 142/90     Resp Rate: 16     O2 Sat (%): 96 %    Lines & Drains:     Urinary Catheter? No   Placement Date:    Medical Necessity:   Central Line? No   Placement Date:    Medical Necessity:   PICC Line? No   Placement Date:    Medical Necessity:     NG tube [] in [] removed [x] not applicable   Drains [] in [] removed [x] not applicable     Skin Integrity:      Wounds: no   Dressings Present:     Wound Concerns:       GI:    Current diet:  DIET NPO Except Meds    Nausea: YES  Vomiting: NO  Bowel Sounds: YES  Flatus: YES  Last Bowel Movement: yesterday   Appearance: loose    Respiratory:  Supplemental O2: No      Device:    via  Liters/min     Incentive Spirometer: NO  Volume:   Coughing and Deep Breathing: YES  Oral Care: YES  Understanding (patient/family education): YES   Getting out of bed: YES  Head of bed elevation: YES    Patient Safety:    Falls Score: 1  Mobility Score: 4  Bed Alarm On? No  Sitter? No      Opportunity for questions and clarification was given to oncoming nurse. Patient bed is in low position, side rails are up x 2, door & observation blinds open as needed, call bell within reach and patient not in distress.     Philipp Jameson RN

## 2017-07-25 ENCOUNTER — ANESTHESIA EVENT (OUTPATIENT)
Dept: SURGERY | Age: 62
DRG: 445 | End: 2017-07-25
Payer: COMMERCIAL

## 2017-07-25 ENCOUNTER — APPOINTMENT (OUTPATIENT)
Dept: GENERAL RADIOLOGY | Age: 62
DRG: 445 | End: 2017-07-25
Attending: INTERNAL MEDICINE
Payer: COMMERCIAL

## 2017-07-25 ENCOUNTER — ANESTHESIA (OUTPATIENT)
Dept: SURGERY | Age: 62
DRG: 445 | End: 2017-07-25
Payer: COMMERCIAL

## 2017-07-25 PROBLEM — K83.1 COMMON BILE DUCT (CBD) OBSTRUCTION: Status: ACTIVE | Noted: 2017-07-25

## 2017-07-25 PROBLEM — N39.0 UTI (URINARY TRACT INFECTION): Status: ACTIVE | Noted: 2017-07-25

## 2017-07-25 LAB
ALBUMIN SERPL BCP-MCNC: 2.7 G/DL (ref 3.5–5)
ALBUMIN/GLOB SERPL: 1 {RATIO} (ref 1.1–2.2)
ALP SERPL-CCNC: 325 U/L (ref 45–117)
ALT SERPL-CCNC: 139 U/L (ref 12–78)
ANION GAP BLD CALC-SCNC: 5 MMOL/L (ref 5–15)
AST SERPL W P-5'-P-CCNC: 48 U/L (ref 15–37)
BACTERIA SPEC CULT: NORMAL
BASOPHILS # BLD AUTO: 0 K/UL (ref 0–0.1)
BASOPHILS # BLD: 1 % (ref 0–1)
BILIRUB SERPL-MCNC: 13 MG/DL (ref 0.2–1)
BUN SERPL-MCNC: 8 MG/DL (ref 6–20)
BUN/CREAT SERPL: 10 (ref 12–20)
CALCIUM SERPL-MCNC: 8.4 MG/DL (ref 8.5–10.1)
CC UR VC: NORMAL
CHLORIDE SERPL-SCNC: 103 MMOL/L (ref 97–108)
CO2 SERPL-SCNC: 27 MMOL/L (ref 21–32)
CREAT SERPL-MCNC: 0.77 MG/DL (ref 0.7–1.3)
EOSINOPHIL # BLD: 0.3 K/UL (ref 0–0.4)
EOSINOPHIL NFR BLD: 5 % (ref 0–7)
ERYTHROCYTE [DISTWIDTH] IN BLOOD BY AUTOMATED COUNT: 15.4 % (ref 11.5–14.5)
GLOBULIN SER CALC-MCNC: 2.8 G/DL (ref 2–4)
GLUCOSE SERPL-MCNC: 109 MG/DL (ref 65–100)
HCT VFR BLD AUTO: 43.4 % (ref 36.6–50.3)
HGB BLD-MCNC: 14.6 G/DL (ref 12.1–17)
INR PPP: 1.1 (ref 0.9–1.1)
LYMPHOCYTES # BLD AUTO: 22 % (ref 12–49)
LYMPHOCYTES # BLD: 1.3 K/UL (ref 0.8–3.5)
MCH RBC QN AUTO: 30.7 PG (ref 26–34)
MCHC RBC AUTO-ENTMCNC: 33.6 G/DL (ref 30–36.5)
MCV RBC AUTO: 91.4 FL (ref 80–99)
MONOCYTES # BLD: 0.3 K/UL (ref 0–1)
MONOCYTES NFR BLD AUTO: 4 % (ref 5–13)
NEUTS SEG # BLD: 4 K/UL (ref 1.8–8)
NEUTS SEG NFR BLD AUTO: 68 % (ref 32–75)
PLATELET # BLD AUTO: 166 K/UL (ref 150–400)
POTASSIUM SERPL-SCNC: 3.6 MMOL/L (ref 3.5–5.1)
PROT SERPL-MCNC: 5.5 G/DL (ref 6.4–8.2)
PROTHROMBIN TIME: 11.1 SEC (ref 9–11.1)
RBC # BLD AUTO: 4.75 M/UL (ref 4.1–5.7)
SERVICE CMNT-IMP: NORMAL
SODIUM SERPL-SCNC: 135 MMOL/L (ref 136–145)
WBC # BLD AUTO: 5.9 K/UL (ref 4.1–11.1)

## 2017-07-25 PROCEDURE — 74011250637 HC RX REV CODE- 250/637: Performed by: ANESTHESIOLOGY

## 2017-07-25 PROCEDURE — 86301 IMMUNOASSAY TUMOR CA 19-9: CPT | Performed by: SPECIALIST

## 2017-07-25 PROCEDURE — 0F798DZ DILATION OF COMMON BILE DUCT WITH INTRALUMINAL DEVICE, VIA NATURAL OR ARTIFICIAL OPENING ENDOSCOPIC: ICD-10-PCS | Performed by: INTERNAL MEDICINE

## 2017-07-25 PROCEDURE — 76010000149 HC OR TIME 1 TO 1.5 HR: Performed by: INTERNAL MEDICINE

## 2017-07-25 PROCEDURE — 74011250636 HC RX REV CODE- 250/636: Performed by: ANESTHESIOLOGY

## 2017-07-25 PROCEDURE — 77030008684 HC TU ET CUF COVD -B: Performed by: NURSE ANESTHETIST, CERTIFIED REGISTERED

## 2017-07-25 PROCEDURE — 65270000029 HC RM PRIVATE

## 2017-07-25 PROCEDURE — 77030032490 HC SLV COMPR SCD KNE COVD -B: Performed by: INTERNAL MEDICINE

## 2017-07-25 PROCEDURE — 74011250636 HC RX REV CODE- 250/636: Performed by: INTERNAL MEDICINE

## 2017-07-25 PROCEDURE — 93005 ELECTROCARDIOGRAM TRACING: CPT

## 2017-07-25 PROCEDURE — 74011250636 HC RX REV CODE- 250/636

## 2017-07-25 PROCEDURE — BF131ZZ FLUOROSCOPY OF GALLBLADDER AND BILE DUCTS USING LOW OSMOLAR CONTRAST: ICD-10-PCS | Performed by: INTERNAL MEDICINE

## 2017-07-25 PROCEDURE — 76060000033 HC ANESTHESIA 1 TO 1.5 HR: Performed by: INTERNAL MEDICINE

## 2017-07-25 PROCEDURE — 74330 X-RAY BILE/PANC ENDOSCOPY: CPT

## 2017-07-25 PROCEDURE — 77030020782 HC GWN BAIR PAWS FLX 3M -B

## 2017-07-25 PROCEDURE — 74011000250 HC RX REV CODE- 250

## 2017-07-25 PROCEDURE — 74011250637 HC RX REV CODE- 250/637: Performed by: INTERNAL MEDICINE

## 2017-07-25 PROCEDURE — 0FB98ZX EXCISION OF COMMON BILE DUCT, VIA NATURAL OR ARTIFICIAL OPENING ENDOSCOPIC, DIAGNOSTIC: ICD-10-PCS | Performed by: INTERNAL MEDICINE

## 2017-07-25 PROCEDURE — 36415 COLL VENOUS BLD VENIPUNCTURE: CPT | Performed by: SPECIALIST

## 2017-07-25 PROCEDURE — 74011000258 HC RX REV CODE- 258

## 2017-07-25 PROCEDURE — 85610 PROTHROMBIN TIME: CPT | Performed by: INTERNAL MEDICINE

## 2017-07-25 PROCEDURE — 88112 CYTOPATH CELL ENHANCE TECH: CPT | Performed by: INTERNAL MEDICINE

## 2017-07-25 PROCEDURE — 77030026438 HC STYL ET INTUB CARD -A: Performed by: NURSE ANESTHETIST, CERTIFIED REGISTERED

## 2017-07-25 PROCEDURE — 76210000016 HC OR PH I REC 1 TO 1.5 HR: Performed by: INTERNAL MEDICINE

## 2017-07-25 PROCEDURE — 85025 COMPLETE CBC W/AUTO DIFF WBC: CPT | Performed by: INTERNAL MEDICINE

## 2017-07-25 PROCEDURE — 80053 COMPREHEN METABOLIC PANEL: CPT | Performed by: INTERNAL MEDICINE

## 2017-07-25 PROCEDURE — C2625 STENT, NON-COR, TEM W/DEL SY: HCPCS | Performed by: INTERNAL MEDICINE

## 2017-07-25 DEVICE — BILIARY STENT WITH NAVIFLEXTM RX DELIVERY SYSTEM
Type: IMPLANTABLE DEVICE | Site: BILE DUCT | Status: NON-FUNCTIONAL
Brand: ADVANIX™ BILIARY
Removed: 2017-08-24

## 2017-07-25 RX ORDER — SODIUM CHLORIDE, SODIUM LACTATE, POTASSIUM CHLORIDE, CALCIUM CHLORIDE 600; 310; 30; 20 MG/100ML; MG/100ML; MG/100ML; MG/100ML
25 INJECTION, SOLUTION INTRAVENOUS CONTINUOUS
Status: DISCONTINUED | OUTPATIENT
Start: 2017-07-25 | End: 2017-07-25 | Stop reason: HOSPADM

## 2017-07-25 RX ORDER — FENTANYL CITRATE 50 UG/ML
50 INJECTION, SOLUTION INTRAMUSCULAR; INTRAVENOUS AS NEEDED
Status: DISCONTINUED | OUTPATIENT
Start: 2017-07-25 | End: 2017-07-25 | Stop reason: HOSPADM

## 2017-07-25 RX ORDER — FENTANYL CITRATE 50 UG/ML
INJECTION, SOLUTION INTRAMUSCULAR; INTRAVENOUS AS NEEDED
Status: DISCONTINUED | OUTPATIENT
Start: 2017-07-25 | End: 2017-07-25 | Stop reason: HOSPADM

## 2017-07-25 RX ORDER — OXYCODONE HYDROCHLORIDE 5 MG/1
5 TABLET ORAL AS NEEDED
Status: DISCONTINUED | OUTPATIENT
Start: 2017-07-25 | End: 2017-07-25 | Stop reason: HOSPADM

## 2017-07-25 RX ORDER — MIDAZOLAM HYDROCHLORIDE 1 MG/ML
1 INJECTION, SOLUTION INTRAMUSCULAR; INTRAVENOUS AS NEEDED
Status: DISCONTINUED | OUTPATIENT
Start: 2017-07-25 | End: 2017-07-25 | Stop reason: HOSPADM

## 2017-07-25 RX ORDER — PHENYLEPHRINE HCL IN 0.9% NACL 0.4MG/10ML
SYRINGE (ML) INTRAVENOUS AS NEEDED
Status: DISCONTINUED | OUTPATIENT
Start: 2017-07-25 | End: 2017-07-25 | Stop reason: HOSPADM

## 2017-07-25 RX ORDER — ACETAMINOPHEN 325 MG/1
650 TABLET ORAL ONCE
Status: COMPLETED | OUTPATIENT
Start: 2017-07-25 | End: 2017-07-25

## 2017-07-25 RX ORDER — SODIUM CHLORIDE 0.9 % (FLUSH) 0.9 %
5-10 SYRINGE (ML) INJECTION AS NEEDED
Status: DISCONTINUED | OUTPATIENT
Start: 2017-07-25 | End: 2017-07-25 | Stop reason: HOSPADM

## 2017-07-25 RX ORDER — DIPHENHYDRAMINE HYDROCHLORIDE 50 MG/ML
12.5 INJECTION, SOLUTION INTRAMUSCULAR; INTRAVENOUS AS NEEDED
Status: DISCONTINUED | OUTPATIENT
Start: 2017-07-25 | End: 2017-07-25 | Stop reason: HOSPADM

## 2017-07-25 RX ORDER — MIDAZOLAM HYDROCHLORIDE 1 MG/ML
INJECTION, SOLUTION INTRAMUSCULAR; INTRAVENOUS AS NEEDED
Status: DISCONTINUED | OUTPATIENT
Start: 2017-07-25 | End: 2017-07-25 | Stop reason: HOSPADM

## 2017-07-25 RX ORDER — MIDAZOLAM HYDROCHLORIDE 1 MG/ML
0.5 INJECTION, SOLUTION INTRAMUSCULAR; INTRAVENOUS
Status: DISCONTINUED | OUTPATIENT
Start: 2017-07-25 | End: 2017-07-25 | Stop reason: HOSPADM

## 2017-07-25 RX ORDER — HYDROMORPHONE HYDROCHLORIDE 1 MG/ML
0.2 INJECTION, SOLUTION INTRAMUSCULAR; INTRAVENOUS; SUBCUTANEOUS
Status: DISCONTINUED | OUTPATIENT
Start: 2017-07-25 | End: 2017-07-25 | Stop reason: HOSPADM

## 2017-07-25 RX ORDER — ONDANSETRON 2 MG/ML
INJECTION INTRAMUSCULAR; INTRAVENOUS AS NEEDED
Status: DISCONTINUED | OUTPATIENT
Start: 2017-07-25 | End: 2017-07-25 | Stop reason: HOSPADM

## 2017-07-25 RX ORDER — LIDOCAINE HYDROCHLORIDE 10 MG/ML
0.1 INJECTION, SOLUTION EPIDURAL; INFILTRATION; INTRACAUDAL; PERINEURAL AS NEEDED
Status: DISCONTINUED | OUTPATIENT
Start: 2017-07-25 | End: 2017-07-25 | Stop reason: HOSPADM

## 2017-07-25 RX ORDER — SODIUM CHLORIDE, SODIUM LACTATE, POTASSIUM CHLORIDE, CALCIUM CHLORIDE 600; 310; 30; 20 MG/100ML; MG/100ML; MG/100ML; MG/100ML
INJECTION, SOLUTION INTRAVENOUS
Status: DISCONTINUED | OUTPATIENT
Start: 2017-07-25 | End: 2017-07-25

## 2017-07-25 RX ORDER — PROPOFOL 10 MG/ML
INJECTION, EMULSION INTRAVENOUS AS NEEDED
Status: DISCONTINUED | OUTPATIENT
Start: 2017-07-25 | End: 2017-07-25 | Stop reason: HOSPADM

## 2017-07-25 RX ORDER — MORPHINE SULFATE 10 MG/ML
2 INJECTION, SOLUTION INTRAMUSCULAR; INTRAVENOUS
Status: DISCONTINUED | OUTPATIENT
Start: 2017-07-25 | End: 2017-07-25 | Stop reason: HOSPADM

## 2017-07-25 RX ORDER — ONDANSETRON 2 MG/ML
INJECTION INTRAMUSCULAR; INTRAVENOUS
Status: DISPENSED
Start: 2017-07-25 | End: 2017-07-26

## 2017-07-25 RX ORDER — SODIUM CHLORIDE, SODIUM LACTATE, POTASSIUM CHLORIDE, CALCIUM CHLORIDE 600; 310; 30; 20 MG/100ML; MG/100ML; MG/100ML; MG/100ML
125 INJECTION, SOLUTION INTRAVENOUS CONTINUOUS
Status: DISCONTINUED | OUTPATIENT
Start: 2017-07-25 | End: 2017-07-25 | Stop reason: HOSPADM

## 2017-07-25 RX ORDER — FLUMAZENIL 0.1 MG/ML
0.2 INJECTION INTRAVENOUS
Status: ACTIVE | OUTPATIENT
Start: 2017-07-25 | End: 2017-07-25

## 2017-07-25 RX ORDER — SODIUM CHLORIDE 9 MG/ML
75 INJECTION, SOLUTION INTRAVENOUS CONTINUOUS
Status: DISPENSED | OUTPATIENT
Start: 2017-07-25 | End: 2017-07-25

## 2017-07-25 RX ORDER — SODIUM CHLORIDE 9 MG/ML
25 INJECTION, SOLUTION INTRAVENOUS CONTINUOUS
Status: DISCONTINUED | OUTPATIENT
Start: 2017-07-25 | End: 2017-07-25 | Stop reason: HOSPADM

## 2017-07-25 RX ORDER — ONDANSETRON 2 MG/ML
4 INJECTION INTRAMUSCULAR; INTRAVENOUS AS NEEDED
Status: DISCONTINUED | OUTPATIENT
Start: 2017-07-25 | End: 2017-07-25 | Stop reason: HOSPADM

## 2017-07-25 RX ORDER — NALOXONE HYDROCHLORIDE 0.4 MG/ML
0.4 INJECTION, SOLUTION INTRAMUSCULAR; INTRAVENOUS; SUBCUTANEOUS
Status: ACTIVE | OUTPATIENT
Start: 2017-07-25 | End: 2017-07-25

## 2017-07-25 RX ORDER — OXYCODONE HYDROCHLORIDE 5 MG/1
TABLET ORAL
Status: DISPENSED
Start: 2017-07-25 | End: 2017-07-26

## 2017-07-25 RX ORDER — EPINEPHRINE 0.1 MG/ML
1 INJECTION INTRACARDIAC; INTRAVENOUS
Status: ACTIVE | OUTPATIENT
Start: 2017-07-25 | End: 2017-07-25

## 2017-07-25 RX ORDER — LIDOCAINE HYDROCHLORIDE 20 MG/ML
INJECTION, SOLUTION EPIDURAL; INFILTRATION; INTRACAUDAL; PERINEURAL AS NEEDED
Status: DISCONTINUED | OUTPATIENT
Start: 2017-07-25 | End: 2017-07-25 | Stop reason: HOSPADM

## 2017-07-25 RX ORDER — SODIUM CHLORIDE 0.9 % (FLUSH) 0.9 %
5-10 SYRINGE (ML) INJECTION EVERY 8 HOURS
Status: DISPENSED | OUTPATIENT
Start: 2017-07-25 | End: 2017-07-26

## 2017-07-25 RX ORDER — FENTANYL CITRATE 50 UG/ML
25 INJECTION, SOLUTION INTRAMUSCULAR; INTRAVENOUS
Status: DISCONTINUED | OUTPATIENT
Start: 2017-07-25 | End: 2017-07-25 | Stop reason: HOSPADM

## 2017-07-25 RX ORDER — DEXAMETHASONE SODIUM PHOSPHATE 4 MG/ML
INJECTION, SOLUTION INTRA-ARTICULAR; INTRALESIONAL; INTRAMUSCULAR; INTRAVENOUS; SOFT TISSUE
Status: DISPENSED
Start: 2017-07-25 | End: 2017-07-26

## 2017-07-25 RX ORDER — DEXAMETHASONE SODIUM PHOSPHATE 4 MG/ML
INJECTION, SOLUTION INTRA-ARTICULAR; INTRALESIONAL; INTRAMUSCULAR; INTRAVENOUS; SOFT TISSUE AS NEEDED
Status: DISCONTINUED | OUTPATIENT
Start: 2017-07-25 | End: 2017-07-25 | Stop reason: HOSPADM

## 2017-07-25 RX ORDER — SUCCINYLCHOLINE CHLORIDE 20 MG/ML
INJECTION INTRAMUSCULAR; INTRAVENOUS AS NEEDED
Status: DISCONTINUED | OUTPATIENT
Start: 2017-07-25 | End: 2017-07-25 | Stop reason: HOSPADM

## 2017-07-25 RX ORDER — SODIUM CHLORIDE 0.9 % (FLUSH) 0.9 %
5-10 SYRINGE (ML) INJECTION EVERY 8 HOURS
Status: DISCONTINUED | OUTPATIENT
Start: 2017-07-25 | End: 2017-07-25 | Stop reason: HOSPADM

## 2017-07-25 RX ORDER — DEXTROMETHORPHAN/PSEUDOEPHED 2.5-7.5/.8
1.2 DROPS ORAL
Status: DISCONTINUED | OUTPATIENT
Start: 2017-07-25 | End: 2017-07-26 | Stop reason: HOSPADM

## 2017-07-25 RX ORDER — ATROPINE SULFATE 0.1 MG/ML
0.5 INJECTION INTRAVENOUS
Status: ACTIVE | OUTPATIENT
Start: 2017-07-25 | End: 2017-07-25

## 2017-07-25 RX ORDER — MIDAZOLAM HYDROCHLORIDE 1 MG/ML
.25-5 INJECTION, SOLUTION INTRAMUSCULAR; INTRAVENOUS
Status: ACTIVE | OUTPATIENT
Start: 2017-07-25 | End: 2017-07-25

## 2017-07-25 RX ORDER — SODIUM CHLORIDE 0.9 % (FLUSH) 0.9 %
5-10 SYRINGE (ML) INJECTION AS NEEDED
Status: ACTIVE | OUTPATIENT
Start: 2017-07-25 | End: 2017-07-25

## 2017-07-25 RX ADMIN — ONDANSETRON 4 MG: 2 INJECTION INTRAMUSCULAR; INTRAVENOUS at 17:53

## 2017-07-25 RX ADMIN — DIPHENHYDRAMINE HYDROCHLORIDE 25 MG: 50 INJECTION, SOLUTION INTRAMUSCULAR; INTRAVENOUS at 07:38

## 2017-07-25 RX ADMIN — DIPHENHYDRAMINE HYDROCHLORIDE 25 MG: 50 INJECTION, SOLUTION INTRAMUSCULAR; INTRAVENOUS at 20:33

## 2017-07-25 RX ADMIN — SODIUM CHLORIDE, SODIUM LACTATE, POTASSIUM CHLORIDE, AND CALCIUM CHLORIDE 25 ML/HR: 600; 310; 30; 20 INJECTION, SOLUTION INTRAVENOUS at 15:16

## 2017-07-25 RX ADMIN — PROPOFOL 150 MG: 10 INJECTION, EMULSION INTRAVENOUS at 16:40

## 2017-07-25 RX ADMIN — MIDAZOLAM HYDROCHLORIDE 2 MG: 1 INJECTION, SOLUTION INTRAMUSCULAR; INTRAVENOUS at 16:35

## 2017-07-25 RX ADMIN — FENTANYL CITRATE 100 MCG: 50 INJECTION, SOLUTION INTRAMUSCULAR; INTRAVENOUS at 16:40

## 2017-07-25 RX ADMIN — GABAPENTIN 900 MG: 300 CAPSULE ORAL at 10:46

## 2017-07-25 RX ADMIN — SODIUM CHLORIDE AND POTASSIUM CHLORIDE: 9; 1.49 INJECTION, SOLUTION INTRAVENOUS at 19:38

## 2017-07-25 RX ADMIN — ACETAMINOPHEN 650 MG: 325 TABLET, FILM COATED ORAL at 16:18

## 2017-07-25 RX ADMIN — SODIUM CHLORIDE AND POTASSIUM CHLORIDE: 9; 1.49 INJECTION, SOLUTION INTRAVENOUS at 00:43

## 2017-07-25 RX ADMIN — CEFTRIAXONE 1 G: 1 INJECTION, POWDER, FOR SOLUTION INTRAMUSCULAR; INTRAVENOUS at 16:52

## 2017-07-25 RX ADMIN — SODIUM CHLORIDE AND POTASSIUM CHLORIDE: 9; 1.49 INJECTION, SOLUTION INTRAVENOUS at 11:22

## 2017-07-25 RX ADMIN — DEXAMETHASONE SODIUM PHOSPHATE 4 MG: 4 INJECTION, SOLUTION INTRA-ARTICULAR; INTRALESIONAL; INTRAMUSCULAR; INTRAVENOUS; SOFT TISSUE at 17:53

## 2017-07-25 RX ADMIN — OXYCODONE HYDROCHLORIDE 5 MG: 5 TABLET ORAL at 18:46

## 2017-07-25 RX ADMIN — SUCCINYLCHOLINE CHLORIDE 160 MG: 20 INJECTION INTRAMUSCULAR; INTRAVENOUS at 16:40

## 2017-07-25 RX ADMIN — RAMIPRIL 10 MG: 5 CAPSULE ORAL at 10:46

## 2017-07-25 RX ADMIN — Medication 80 MCG: at 17:02

## 2017-07-25 RX ADMIN — Medication 80 MCG: at 17:01

## 2017-07-25 RX ADMIN — GABAPENTIN 900 MG: 300 CAPSULE ORAL at 20:31

## 2017-07-25 RX ADMIN — LIDOCAINE HYDROCHLORIDE 80 MG: 20 INJECTION, SOLUTION EPIDURAL; INFILTRATION; INTRACAUDAL; PERINEURAL at 16:40

## 2017-07-25 RX ADMIN — DIPHENHYDRAMINE HYDROCHLORIDE 25 MG: 50 INJECTION, SOLUTION INTRAMUSCULAR; INTRAVENOUS at 14:14

## 2017-07-25 NOTE — PROGRESS NOTES
Hospitalist Progress Note    NAME: Darek Bill   :  1955   MRN:  [id number]       Assessment / Plan:  Obstructive Jaundice POA  Dilated CBD / GB sludge/tiny stone  Concern for malignancy vs choledocholithiasis  US abdomen: Distended common bile duct 1.6cm.  There is sludge and/or tiny stones in the gallbladder which also appears distended. Sludge and/or tiny stones in the gallbladder. The gallbladder appears distended. No wall thickening or pericholecystic fluid collections. No  sonographic Dhaliwal's sign. CT A/P= Marked intrahepatic biliary dilatation and common bile duct  dilatation to the level of the mid common bile duct which is markedly narrowed. No discrete pancreatic mass visualized. Recommend ERCP for further evaluation    Continue NPO, IVF for now  Unable to get MRI due to metal in the spine per patient  CT A/P with contrast noted  To get ERCP with stone extraction vs EUS today evening  Cont rocephin for now      Recent UTI POA- ruled out now  Urine Cx neg  Switched Levaquin to IV Rocephin for now, plan to stop post procedure in 24 hrs     HTN  Continue altace     S/P ablation for Afib  Only on ASA which we are holding for now. No longer on coumadin or any rate controlling agent. No longer follows with cardiology.       Sjogren's disease  Not on meds     Hives / urticaria  Continue zyrtec with IV benadryl prn      Code Status:   Full  Surrogate Decision Maker: wife     DVT Prophylaxis:   SCD. Avoiding lovenox for anticipated procedures   GI Prophylaxis: not indicated             Subjective: Pt seen and examined at bedside. NAD, denies any pain.  Overnight events d/w RN   CHIEF COMPLAINT:  f/u \"Jaundice, chalky stools\"     Review of Systems:  Symptom Y/N Comments  Symptom Y/N Comments   Fever/Chills n   Chest Pain n    Poor Appetite y   Edema n    Cough n   Abdominal Pain n    Sputum n   Joint Pain     SOB/VIVEROS n   Pruritis/Rash     Nausea/vomit n   Tolerating PT/OT y    Diarrhea Tolerating Diet  NPO   Constipation    Other       Could NOT obtain due to:      Objective:     VITALS:   Last 24hrs VS reviewed since prior progress note. Most recent are:  Patient Vitals for the past 24 hrs:   Temp Pulse Resp BP SpO2   07/25/17 1435 98.8 °F (37.1 °C) 73 18 119/69 100 %   07/25/17 1102 97.6 °F (36.4 °C) 67 17 131/79 98 %   07/25/17 0806 99 °F (37.2 °C) 67 17 (!) 149/93 96 %   07/25/17 0345 98.4 °F (36.9 °C) 75 18 128/77 96 %   07/24/17 2319 98.3 °F (36.8 °C) 80 18 137/84 99 %   07/24/17 1957 98.3 °F (36.8 °C) 68 18 142/86 98 %       Intake/Output Summary (Last 24 hours) at 07/25/17 1546  Last data filed at 07/25/17 4934   Gross per 24 hour   Intake          3803.33 ml   Output             2725 ml   Net          1078.33 ml        PHYSICAL EXAM:  General: WD, WN. Alert, cooperative, no acute distress    EENT:  EOMI. Anicteric sclerae. MMM, Icteric Sclera noted +  Resp:  CTA bilaterally, no wheezing or rales. No accessory muscle use  CV:  Regular  rhythm,  No edema  GI:  Soft, Non distended, Non tender.  +Bowel sounds  Neurologic:  Alert and oriented X 3, normal speech,   Psych:   Good insight. Not anxious nor agitated  Skin:  No rashes. No jaundice    Reviewed most current lab test results and cultures  YES  Reviewed most current radiology test results   YES  Review and summation of old records today    NO  Reviewed patient's current orders and MAR    YES  PMH/SH reviewed - no change compared to H&P  ________________________________________________________________________  Care Plan discussed with:    Comments   Patient x    Family  x Wife & son at bedside   RN     Care Manager x [address] team rounds were held today with , nursing, pharmacist and clinical coordinator. Patient's plan of care was discussed; medications were reviewed and discharge planning was addressed. ________________________________________________________________________  Total NON critical care TIME:  26  Minutes    Total CRITICAL CARE TIME Spent:   Minutes non procedure based      Comments   >50% of visit spent in counseling and coordination of care     ________________________________________________________________________  Suzy Pozo MD     Procedures: see electronic medical records for all procedures/Xrays and details which were not copied into this note but were reviewed prior to creation of Plan. LABS:  I reviewed today's most current labs and imaging studies.   Pertinent labs include:  Recent Labs      07/25/17 0342 07/24/17 0416  07/23/17   1839   WBC  5.9  6.2  6.8   HGB  14.6  14.8  16.3   HCT  43.4  44.3  46.7   PLT  166  160  174     Recent Labs      07/25/17 0342 07/24/17   0416  07/23/17   1839   NA  135*  136  137   K  3.6  3.4*  3.4*   CL  103  105  101   CO2  27  23  25   GLU  109*  116*  126*   BUN  8  14  16   CREA  0.77  0.82  1.09   CA  8.4*  8.0*  8.5   MG   --   2.3   --    ALB  2.7*  2.8*  3.2*   TBILI  13.0*  12.4*  14.0*   SGOT  48*  56*  65*   ALT  139*  163*  199*   INR  1.1   --    --        Signed: Suzy Pozo MD

## 2017-07-25 NOTE — ROUTINE PROCESS
sbar in note pt to holding area. Identifies self and procedure for today.  ekg done in holding area.   Pt had ekg ordered this am and not done by the floor nurse

## 2017-07-25 NOTE — PERIOP NOTES
TRANSFER - IN REPORT:    Verbal report received from [facility]) on Roger Cruz  being received from General Surgery Room 2143(unit) for ordered procedure      Report consisted of patients Situation, Background, Assessment and   Recommendations(SBAR). Information from the following report(s) SBAR, Intake/Output, MAR and Recent Results was reviewed with the receiving nurse. Opportunity for questions and clarification was provided. Assessment completed upon patients arrival to unit and care assumed.

## 2017-07-25 NOTE — PROGRESS NOTES
Gastroenterology Progress Note    7/25/2017    Admit Date: 7/23/2017    Subjective: Follow up for: jaundice, abnormal CT      No new issues. Icteric, pruritic and with dark urine/light stools. Patient was seen in rounds by me today. Current Facility-Administered Medications   Medication Dose Route Frequency    cholecalciferol (VITAMIN D3) tablet 5,000 Units  5,000 Units Oral DAILY    ramipril (ALTACE) capsule 10 mg  10 mg Oral DAILY    gabapentin (NEURONTIN) capsule 900 mg  900 mg Oral BID    cefTRIAXone (ROCEPHIN) 1 g in 0.9% sodium chloride (MBP/ADV) 50 mL  1 g IntraVENous Q24H    0.9% sodium chloride with KCl 20 mEq/L infusion   IntraVENous CONTINUOUS    ondansetron (ZOFRAN) injection 4 mg  4 mg IntraVENous Q4H PRN    diphenhydrAMINE (BENADRYL) injection 25 mg  25 mg IntraVENous Q6H PRN    cetirizine (ZYRTEC) tablet 10 mg  10 mg Oral DAILY        Objective:     Blood pressure 128/77, pulse 75, temperature 98.4 °F (36.9 °C), resp. rate 18, height 5' 8\" (1.727 m), weight 89 kg (196 lb 3.4 oz), SpO2 96 %.         07/23 1901 - 07/25 0700  In: 3803.3 [P.O.:820; I.V.:2983.3]  Out: 9755 [Urine:4025]        Physical Examination:       General:AAO x 3, jaundiced  HEENT:  EOMI,   Chest:  CTA, No rhonchi, rales or rubs.   Heart: S1, S2, RRR  GI: Soft, NT, ND + bowel sounds      Data Review    Recent Results (from the past 24 hour(s))   METABOLIC PANEL, COMPREHENSIVE    Collection Time: 07/25/17  3:42 AM   Result Value Ref Range    Sodium 135 (L) 136 - 145 mmol/L    Potassium 3.6 3.5 - 5.1 mmol/L    Chloride 103 97 - 108 mmol/L    CO2 27 21 - 32 mmol/L    Anion gap 5 5 - 15 mmol/L    Glucose 109 (H) 65 - 100 mg/dL    BUN 8 6 - 20 MG/DL    Creatinine 0.77 0.70 - 1.30 MG/DL    BUN/Creatinine ratio 10 (L) 12 - 20      GFR est AA >60 >60 ml/min/1.73m2    GFR est non-AA >60 >60 ml/min/1.73m2    Calcium 8.4 (L) 8.5 - 10.1 MG/DL    Bilirubin, total 13.0 (H) 0.2 - 1.0 MG/DL    ALT (SGPT) 139 (H) 12 - 78 U/L    AST (SGOT) 48 (H) 15 - 37 U/L    Alk. phosphatase 325 (H) 45 - 117 U/L    Protein, total 5.5 (L) 6.4 - 8.2 g/dL    Albumin 2.7 (L) 3.5 - 5.0 g/dL    Globulin 2.8 2.0 - 4.0 g/dL    A-G Ratio 1.0 (L) 1.1 - 2.2     CBC WITH AUTOMATED DIFF    Collection Time: 07/25/17  3:42 AM   Result Value Ref Range    WBC 5.9 4.1 - 11.1 K/uL    RBC 4.75 4.10 - 5.70 M/uL    HGB 14.6 12.1 - 17.0 g/dL    HCT 43.4 36.6 - 50.3 %    MCV 91.4 80.0 - 99.0 FL    MCH 30.7 26.0 - 34.0 PG    MCHC 33.6 30.0 - 36.5 g/dL    RDW 15.4 (H) 11.5 - 14.5 %    PLATELET 792 648 - 832 K/uL    NEUTROPHILS 68 32 - 75 %    LYMPHOCYTES 22 12 - 49 %    MONOCYTES 4 (L) 5 - 13 %    EOSINOPHILS 5 0 - 7 %    BASOPHILS 1 0 - 1 %    ABS. NEUTROPHILS 4.0 1.8 - 8.0 K/UL    ABS. LYMPHOCYTES 1.3 0.8 - 3.5 K/UL    ABS. MONOCYTES 0.3 0.0 - 1.0 K/UL    ABS. EOSINOPHILS 0.3 0.0 - 0.4 K/UL    ABS. BASOPHILS 0.0 0.0 - 0.1 K/UL   PROTHROMBIN TIME + INR    Collection Time: 07/25/17  3:42 AM   Result Value Ref Range    INR 1.1 0.9 - 1.1      Prothrombin time 11.1 9.0 - 11.1 sec     Recent Labs      07/25/17   0342  07/24/17   0416   WBC  5.9  6.2   HGB  14.6  14.8   HCT  43.4  44.3   PLT  166  160     Recent Labs      07/25/17   0342  07/24/17   0416  07/23/17   1839   NA  135*  136  137   K  3.6  3.4*  3.4*   CL  103  105  101   CO2  27  23  25   BUN  8  14  16   CREA  0.77  0.82  1.09   GLU  109*  116*  126*   CA  8.4*  8.0*  8.5   MG   --   2.3   --      Recent Labs      07/25/17   0342  07/24/17   0416  07/23/17   1839   SGOT  48*  56*  65*   AP  325*  330*  375*   TP  5.5*  5.6*  6.5   ALB  2.7*  2.8*  3.2*   GLOB  2.8  2.8  3.3   LPSE   --   202  251     Recent Labs      07/25/17   0342   INR  1.1   PTP  11.1      No results for input(s): FE, TIBC, PSAT, FERR in the last 72 hours. No results found for: FOL, RBCF   No results for input(s): PH, PCO2, PO2 in the last 72 hours. No results for input(s): CPK, CKNDX, TROIQ in the last 72 hours.     No lab exists for component: CPKMB  No results found for: CHOL, CHOLX, CHLST, CHOLV, HDL, LDL, LDLC, DLDLP, TGLX, TRIGL, TRIGP, CHHD, CHHDX  No components found for: [place]  Lab Results   Component Value Date/Time    Color DARK YELLOW 07/23/2017 06:39 PM    Appearance CLOUDY 07/23/2017 06:39 PM    Specific gravity 1.021 07/23/2017 06:39 PM    pH (UA) 5.5 07/23/2017 06:39 PM    Protein TRACE 07/23/2017 06:39 PM    Glucose NEGATIVE  07/23/2017 06:39 PM    Ketone TRACE 07/23/2017 06:39 PM    Urobilinogen 1.0 07/23/2017 06:39 PM    Nitrites NEGATIVE  07/23/2017 06:39 PM    Leukocyte Esterase SMALL 07/23/2017 06:39 PM    Epithelial cells FEW 07/23/2017 06:39 PM    Bacteria 1+ 07/23/2017 06:39 PM    WBC 0-4 07/23/2017 06:39 PM    RBC 5-10 07/23/2017 06:39 PM        ROS: -CP, SOB, Dysuria, palpitations, cough. Assessment:    Active Problems:    Obstructive jaundice (7/23/2017)             Plan/Discussion:     ERCP with brushings,dilation and stenting attempt today in late pm.   I discussed with the patient and wife(RN) the objectives, risks, consequences and alternatives(IR attempt) to ERCP with possible stentiing and treatment of bleeding, perforation and pancreatitis in case of compliactions. Signed By: Felicia Arroyo.  Lorelei Alatorre MD    7/25/2017  7:27 AM

## 2017-07-25 NOTE — PROGRESS NOTES
F/U for obstructive jaundice      S: Mr. Elliott Patel was seen by me today during rounds. At this time, he is resting +comfortably. I saw him in pacu post ercp. The patient has no new complaints today. Please see admission consult for details of ROS; there are no other  changes today. O: Blood pressure 141/74, pulse 78, temperature 97.7 °F (36.5 °C), resp. rate 18, height 5' 8\" (1.727 m), weight 89 kg (196 lb 3.4 oz), SpO2 97 %. Gen: Patient is in no acute distress. There is + jaundice. Lungs: Clear to auscultation bilaterally . Heart:+RRR. Abd: Soft, non tender, +distended, bowel sounds present. Extremities: Warm. Lab Results   Component Value Date/Time    WBC 5.9 07/25/2017 03:42 AM    HGB 14.6 07/25/2017 03:42 AM    HCT 43.4 07/25/2017 03:42 AM    PLATELET 752 52/44/1453 03:42 AM    MCV 91.4 07/25/2017 03:42 AM     Lab Results   Component Value Date/Time    Sodium 135 07/25/2017 03:42 AM    Potassium 3.6 07/25/2017 03:42 AM    Chloride 103 07/25/2017 03:42 AM    CO2 27 07/25/2017 03:42 AM    Anion gap 5 07/25/2017 03:42 AM    Glucose 109 07/25/2017 03:42 AM    BUN 8 07/25/2017 03:42 AM    Creatinine 0.77 07/25/2017 03:42 AM    BUN/Creatinine ratio 10 07/25/2017 03:42 AM    GFR est AA >60 07/25/2017 03:42 AM    GFR est non-AA >60 07/25/2017 03:42 AM    Calcium 8.4 07/25/2017 03:42 AM    Bilirubin, total 13.0 07/25/2017 03:42 AM    AST (SGOT) 48 07/25/2017 03:42 AM    Alk.  phosphatase 325 07/25/2017 03:42 AM    Protein, total 5.5 07/25/2017 03:42 AM    Albumin 2.7 07/25/2017 03:42 AM    Globulin 2.8 07/25/2017 03:42 AM    A-G Ratio 1.0 07/25/2017 03:42 AM    ALT (SGPT) 139 07/25/2017 03:42 AM       Ercp:    Endoscopic: -normal esophagus, stomach, and duodenum     Ampulla: -normal      Cholangiogram: -stricture (5 cm), involving the common bile duct      Pancreatogram: not performed  A: Active Problems:    Obstructive jaundice (7/23/2017)      Common bile duct (CBD) obstruction (7/25/2017)      UTI (urinary tract infection) (7/25/2017)        Comment:  The stricture is either malignant or inflammatory.    P:  Await ca 19-9  CT pancreas protocol  Await brushing from today  Dr Carmella Conte to follow in my absence 7.26 to 7.28    Elisa Baig MD  7:29 PM  7/25/2017

## 2017-07-25 NOTE — PROCEDURES
NAME:  Feliciano Salter   :   1955   MRN:   [id number]     Date/Time:  2017 5:39 PM    Procedure Type:   ERCPwith biliary sphincterotomy, biliary stent placement, biliary dilation and biliary tissue sampling     Indications:   jaundice, abnormal CT    Pre-operative Diagnosis: See indication above    Post-operative Diagnosis:  See findings below    : Patricio Chapin MD    Referring Provider: Cr Barahona    Sedation:  General Anesthesia    Procedure Details:  After informed consent was obtained with all risks and benefits of procedure explained, the patient was taken to the OR and placed in the prone position. Upon giving sedation as per above, the Olympus duodenoscope [id number]   was inserted via the mouthpeice and carefully advanced to the second portion of the duodenum. The quality of visualization was good. The duodenoscope was withdrawn into a short position. Findings:     Endoscopic: -normal esophagus, stomach, and duodenum    Ampulla: -normal     Cholangiogram: -stricture (5 cm), involving the common bile duct     Pancreatogram: not performed    Procedure in detail:    Using the [address] AV43 Dreamtome preloaded with a 0.035 inch Dreamwire, the common bile duct was successfully cannulated. Proper positioning was confirmed with bile aspiration. Limited contrast was injected and it  revealed a long stricture located in the mid common bile duct. The CBD above the stricture and intra hepatic ducts are dilated. Next, a biliary sphincterotomy was performed per protocol. Then, using a [address] Biliary Cytology Brush, the common bile duct  was sampled. Using a 4 x 4 Hurricane balloon the CBD stricture was dilated, sequentially. A plastic, 10 Fr x 9 cm straight stent was then placed in the CBD with copious bile release.     There was spontaneous drainage of bile and contrast.     The pancreatic duct was neither entered nor injected during this procedure. I performed all immediate radiologic interpretation during this procedure. GB did not fill. Complications: None. Recommendations:    -Await brushings and CA 19-9,  -Follow up LFTs,  -CT with pancreatic protocol suggested. Breana Parikh MD

## 2017-07-25 NOTE — PROGRESS NOTES
End of Shift Nursing Note    Bedside shift change report given to [address] [place] [address] (oncoming nurse) by Christiano Daniel (offgoing nurse). Report included the following information SBAR, Kardex, Intake/Output, MAR and Recent Results. Zone Phone:   [phone]    Significant changes during shift:    none   Non-emergent issues for physician to address:        Number times ambulated in hallway past shift:   Up ad lola    Number of times OOB to chair past shift: x1    POD #:      Vital Signs:    Temp: 98.4 °F (36.9 °C)     Pulse (Heart Rate): 75     BP: 128/77     Resp Rate: 18     O2 Sat (%): 96 %    Lines & Drains:     Urinary Catheter? No   Placement Date:    Medical Necessity:   Central Line? No   Placement Date:    Medical Necessity:   PICC Line? No   Placement Date:    Medical Necessity:     NG tube [] in [] removed [x] not applicable   Drains [] in [] removed [x] not applicable     Skin Integrity:      Wounds: no   Dressings Present:     Wound Concerns:       GI:    Current diet:  DIET NPO  DIET ONE TIME MESSAGE    Nausea: NO  Vomiting: NO  Bowel Sounds: YES  Flatus: YES  Last Bowel Movement:    Appearance:     Respiratory:  Supplemental O2: No      Device:    via  Liters/min     Incentive Spirometer: YES  Volume:   Coughing and Deep Breathing: YES  Oral Care: YES  Understanding (patient/family education): YES   Getting out of bed: YES  Head of bed elevation: YES    Patient Safety:    Falls Score: 1  Mobility Score: 4  Bed Alarm On? No  Sitter? No      Opportunity for questions and clarification was given to oncoming nurse. Patient bed is in low position, side rails are up x 2, door & observation blinds open as needed, call bell within reach and patient not in distress.     Jacinta Leventhal, RN

## 2017-07-25 NOTE — ANESTHESIA POSTPROCEDURE EVALUATION
Post-Anesthesia Evaluation and Assessment    Patient: Judith Krause MRN: [id number]  SSN: [id number]    YOB: 1955  Age: 62 y.o. Sex: male       Cardiovascular Function/Vital Signs  Visit Vitals    /73 (BP 1 Location: Left arm, BP Patient Position: At rest)    Pulse 68    Temp 37.1 °C (98.8 °F)    Resp 17    Ht 5' 8\" (1.727 m)    Wt 89 kg (196 lb 3.4 oz)    SpO2 95%    BMI 29.83 kg/m2       Patient is status post general anesthesia for Procedure(s):  ENDOSCOPIC RETROGRADE CHOLANGIOPANCREATOGRAPHY, BALLOON DILATATION,SPINCTEROTOMY, CYTOLOGY BRUSHINGS OF COMMON BILE DUCT, COMMON BILE DUCT STENT INSERTION. Nausea/Vomiting: None    Postoperative hydration reviewed and adequate. Pain:  Pain Scale 1: Numeric (0 - 10) (07/25/17 1845)  Pain Intensity 1: 4 (07/25/17 1845)   Managed    Neurological Status:   Neuro (WDL): Exceptions to WDL (07/25/17 1805)  Neuro  Neurologic State: Drowsy; Eyes open to voice (07/25/17 1805)  Orientation Level: Oriented X4 (07/25/17 1805)  Cognition: Follows commands (07/25/17 1805)  Speech: Clear (07/25/17 1805)  LUE Motor Response: Purposeful (07/25/17 1805)  LLE Motor Response: Purposeful (07/25/17 1805)  RUE Motor Response: Purposeful (07/25/17 1805)  RLE Motor Response: Purposeful (07/25/17 1805)   At baseline    Mental Status and Level of Consciousness: Arousable    Pulmonary Status:   O2 Device: Room air (07/25/17 1845)   Adequate oxygenation and airway patent    Complications related to anesthesia: None    Post-anesthesia assessment completed.  No concerns    Signed By: Luis Alberto Berg MD     July 25, 2017

## 2017-07-25 NOTE — PERIOP NOTES
Handoff Report from Operating Room to PACU    Report received from [id number] Courtney Ch RN and LEONA Hutchinson regarding Sandy Kat. Surgeon(s):  Millie Haas MD  And Procedure(s) (LRB):  ENDOSCOPIC RETROGRADE CHOLANGIOPANCREATOGRAPHY, BALLOON DILATATION,SPINCTEROTOMY, CYTOLOGY BRUSHINGS OF COMMON BILE DUCT, COMMON BILE DUCT STENT INSERTION (N/A)  confirmed   with allergies and dressings discussed. Anesthesia type, drugs, patient history, complications, estimated blood loss, vital signs, intake and output, and last pain medication, lines and temperature were reviewed.

## 2017-07-25 NOTE — ANESTHESIA PREPROCEDURE EVALUATION
Anesthetic History   No history of anesthetic complications            Review of Systems / Medical History  Patient summary reviewed, nursing notes reviewed and pertinent labs reviewed    Pulmonary  Within defined limits                 Neuro/Psych   Within defined limits           Cardiovascular    Hypertension        Dysrhythmias : atrial fibrillation           GI/Hepatic/Renal  Within defined limits              Endo/Other  Within defined limits           Other Findings   Comments: Idiopathic hives         Physical Exam    Airway  Mallampati: II  TM Distance: > 6 cm  Neck ROM: normal range of motion   Mouth opening: Normal     Cardiovascular  Regular rate and rhythm,  S1 and S2 normal,  no murmur, click, rub, or gallop             Dental  No notable dental hx       Pulmonary  Breath sounds clear to auscultation               Abdominal  GI exam deferred       Other Findings            Anesthetic Plan    ASA: 2  Anesthesia type: general          Induction: Intravenous  Anesthetic plan and risks discussed with: Patient

## 2017-07-25 NOTE — PERIOP NOTES
TRANSFER - OUT REPORT:    Verbal report given to ESVIN Hollins on Adela Marsh  being transferred to Gen Surg, 2143 for routine post - op       Report consisted of patients Situation, Background, Assessment and   Recommendations(SBAR). Information from the following report(s) SBAR, Kardex, OR Summary, Intake/Output, MAR and Cardiac Rhythm NSR was reviewed with the receiving nurse. Opportunity for questions and clarification was provided.       Patient transported with:   [employer]

## 2017-07-26 ENCOUNTER — APPOINTMENT (OUTPATIENT)
Dept: CT IMAGING | Age: 62
DRG: 445 | End: 2017-07-26
Attending: INTERNAL MEDICINE
Payer: COMMERCIAL

## 2017-07-26 VITALS
DIASTOLIC BLOOD PRESSURE: 79 MMHG | HEART RATE: 64 BPM | RESPIRATION RATE: 18 BRPM | BODY MASS INDEX: 29.74 KG/M2 | WEIGHT: 196.21 LBS | HEIGHT: 68 IN | OXYGEN SATURATION: 98 % | TEMPERATURE: 97.6 F | SYSTOLIC BLOOD PRESSURE: 148 MMHG

## 2017-07-26 LAB
ALBUMIN SERPL BCP-MCNC: 2.8 G/DL (ref 3.5–5)
ALBUMIN/GLOB SERPL: 0.9 {RATIO} (ref 1.1–2.2)
ALP SERPL-CCNC: 340 U/L (ref 45–117)
ALT SERPL-CCNC: 124 U/L (ref 12–78)
ANION GAP BLD CALC-SCNC: 4 MMOL/L (ref 5–15)
AST SERPL W P-5'-P-CCNC: 44 U/L (ref 15–37)
BILIRUB DIRECT SERPL-MCNC: 9.8 MG/DL (ref 0–0.2)
BILIRUB SERPL-MCNC: 12.4 MG/DL (ref 0.2–1)
BUN SERPL-MCNC: 10 MG/DL (ref 6–20)
BUN/CREAT SERPL: 11 (ref 12–20)
CALCIUM SERPL-MCNC: 8.6 MG/DL (ref 8.5–10.1)
CANCER AG19-9 SERPL-ACNC: 24 U/ML (ref 0–35)
CHLORIDE SERPL-SCNC: 100 MMOL/L (ref 97–108)
CO2 SERPL-SCNC: 29 MMOL/L (ref 21–32)
CREAT SERPL-MCNC: 0.88 MG/DL (ref 0.7–1.3)
ERYTHROCYTE [DISTWIDTH] IN BLOOD BY AUTOMATED COUNT: 15.5 % (ref 11.5–14.5)
GLOBULIN SER CALC-MCNC: 3 G/DL (ref 2–4)
GLUCOSE SERPL-MCNC: 134 MG/DL (ref 65–100)
HCT VFR BLD AUTO: 43.9 % (ref 36.6–50.3)
HGB BLD-MCNC: 14.5 G/DL (ref 12.1–17)
MCH RBC QN AUTO: 30.3 PG (ref 26–34)
MCHC RBC AUTO-ENTMCNC: 33 G/DL (ref 30–36.5)
MCV RBC AUTO: 91.8 FL (ref 80–99)
PLATELET # BLD AUTO: 181 K/UL (ref 150–400)
POTASSIUM SERPL-SCNC: 3.6 MMOL/L (ref 3.5–5.1)
PROT SERPL-MCNC: 5.8 G/DL (ref 6.4–8.2)
RBC # BLD AUTO: 4.78 M/UL (ref 4.1–5.7)
SODIUM SERPL-SCNC: 133 MMOL/L (ref 136–145)
WBC # BLD AUTO: 5.1 K/UL (ref 4.1–11.1)

## 2017-07-26 PROCEDURE — 74011250636 HC RX REV CODE- 250/636: Performed by: INTERNAL MEDICINE

## 2017-07-26 PROCEDURE — 74170 CT ABD WO CNTRST FLWD CNTRST: CPT

## 2017-07-26 PROCEDURE — 74011250637 HC RX REV CODE- 250/637: Performed by: INTERNAL MEDICINE

## 2017-07-26 PROCEDURE — 80048 BASIC METABOLIC PNL TOTAL CA: CPT | Performed by: INTERNAL MEDICINE

## 2017-07-26 PROCEDURE — 36415 COLL VENOUS BLD VENIPUNCTURE: CPT | Performed by: INTERNAL MEDICINE

## 2017-07-26 PROCEDURE — 80076 HEPATIC FUNCTION PANEL: CPT | Performed by: INTERNAL MEDICINE

## 2017-07-26 PROCEDURE — 85027 COMPLETE CBC AUTOMATED: CPT | Performed by: INTERNAL MEDICINE

## 2017-07-26 RX ORDER — SODIUM CHLORIDE 0.9 % (FLUSH) 0.9 %
10 SYRINGE (ML) INJECTION
Status: DISCONTINUED | OUTPATIENT
Start: 2017-07-26 | End: 2017-07-26 | Stop reason: HOSPADM

## 2017-07-26 RX ORDER — SODIUM CHLORIDE 9 MG/ML
50 INJECTION, SOLUTION INTRAVENOUS
Status: DISCONTINUED | OUTPATIENT
Start: 2017-07-26 | End: 2017-07-26 | Stop reason: HOSPADM

## 2017-07-26 RX ORDER — HYDROXYZINE 50 MG/1
50 TABLET, FILM COATED ORAL
Qty: 30 TAB | Refills: 0 | Status: SHIPPED | OUTPATIENT
Start: 2017-07-26 | End: 2017-08-05

## 2017-07-26 RX ADMIN — SODIUM CHLORIDE AND POTASSIUM CHLORIDE: 9; 1.49 INJECTION, SOLUTION INTRAVENOUS at 03:07

## 2017-07-26 RX ADMIN — CHOLECALCIFEROL TAB 25 MCG (1000 UNIT) 5000 UNITS: 25 TAB at 10:01

## 2017-07-26 RX ADMIN — RAMIPRIL 10 MG: 5 CAPSULE ORAL at 10:01

## 2017-07-26 RX ADMIN — CETIRIZINE HYDROCHLORIDE 10 MG: 10 TABLET, FILM COATED ORAL at 10:01

## 2017-07-26 RX ADMIN — DIPHENHYDRAMINE HYDROCHLORIDE 25 MG: 50 INJECTION, SOLUTION INTRAMUSCULAR; INTRAVENOUS at 06:50

## 2017-07-26 RX ADMIN — GABAPENTIN 900 MG: 300 CAPSULE ORAL at 10:01

## 2017-07-26 RX ADMIN — DIPHENHYDRAMINE HYDROCHLORIDE 25 MG: 50 INJECTION, SOLUTION INTRAMUSCULAR; INTRAVENOUS at 12:31

## 2017-07-26 NOTE — CDMP QUERY
Please clarify if this patient is being treated/managed for:    =>Mild Hyponatremia in the setting of Obstructive jaundice requiring IV fluids and lab monitoring   =>Other Explanation of clinical findings  =>Unable to Determine (no explanation of clinical findings)    The medical record reflects the following clinical findings, treatment, and risk factors:    Risk Factors: Obstructive jaundice  Clinical Indicators: Na 135--133  Treatment: IV fluids and lab monitoring     Please clarify and document your clinical opinion in the progress notes and discharge summary including the definitive and/or presumptive diagnosis, (suspected or probable), related to the above clinical findings. Please include clinical findings supporting your diagnosis.     Thank you,         [facility] [address] [place] [address]

## 2017-07-26 NOTE — PROGRESS NOTES
F/U for obstructive jaundice(for Omie Guest)      S: Mr. Roseanne Scott was seen by me today during rounds. At this time, he is resting +comfortably. The patient has no new complaints today. Please see admission consult for details of ROS; there are no other  changes today. O: Blood pressure 137/84, pulse (!) 54, temperature 97.4 °F (36.3 °C), resp. rate 17, height 5' 8\" (1.727 m), weight 89 kg (196 lb 3.4 oz), SpO2 97 %. Gen: Patient is in no acute distress. There is + jaundice. Lungs: Clear to auscultation bilaterally . Heart:+RRR. Abd: Soft, non tender, +distended, bowel sounds present. Extremities: Warm. Lab Results   Component Value Date/Time    WBC 5.1 07/26/2017 03:09 AM    HGB 14.5 07/26/2017 03:09 AM    HCT 43.9 07/26/2017 03:09 AM    PLATELET 164 35/53/7115 03:09 AM    MCV 91.8 07/26/2017 03:09 AM     Lab Results   Component Value Date/Time    Sodium 133 07/26/2017 03:09 AM    Potassium 3.6 07/26/2017 03:09 AM    Chloride 100 07/26/2017 03:09 AM    CO2 29 07/26/2017 03:09 AM    Anion gap 4 07/26/2017 03:09 AM    Glucose 134 07/26/2017 03:09 AM    BUN 10 07/26/2017 03:09 AM    Creatinine 0.88 07/26/2017 03:09 AM    BUN/Creatinine ratio 11 07/26/2017 03:09 AM    GFR est AA >60 07/26/2017 03:09 AM    GFR est non-AA >60 07/26/2017 03:09 AM    Calcium 8.6 07/26/2017 03:09 AM    Bilirubin, total 12.4 07/26/2017 03:09 AM    AST (SGOT) 44 07/26/2017 03:09 AM    Alk.  phosphatase 340 07/26/2017 03:09 AM    Protein, total 5.8 07/26/2017 03:09 AM    Albumin 2.8 07/26/2017 03:09 AM    Globulin 3.0 07/26/2017 03:09 AM    A-G Ratio 0.9 07/26/2017 03:09 AM    ALT (SGPT) 124 07/26/2017 03:09 AM       Ercp:    Endoscopic: -normal esophagus, stomach, and duodenum     Ampulla: -normal      Cholangiogram: -stricture (5 cm), involving the common bile duct      Pancreatogram: not performed  A: Active Problems:    Obstructive jaundice (7/23/2017)      Common bile duct (CBD) obstruction (7/25/2017)      UTI (urinary tract infection) (7/25/2017)        Comment:  The stricture is either malignant or inflammatory(less likely). PLAN:    · Await CA 19-9  · CT pancreas protocol-ordered  · Await brushings from ERCP. · I expect the T bili to start dropping in a day or so (delta bilirubin effect)  · D/w family and pt in detail.     Millie Haas MD  7:29 PM  7/26/2017

## 2017-07-26 NOTE — PROGRESS NOTES
End of Shift Nursing Note    Bedside shift change report given to [address] [place] [address] (oncoming nurse) by Aarti Quezada RN (offgoing nurse). Report included the following information SBAR, Kardex, Procedure Summary, Intake/Output, MAR and Recent Results. Zone Phone:       Significant changes during shift:    Passing flatus, tolerating clear liquids   Non-emergent issues for physician to address:   none     Number times ambulated in hallway past shift: 1      Number of times OOB to chair past shift: 2    POD #: 1     Vital Signs:    Temp: 97.7 °F (36.5 °C)     Pulse (Heart Rate): 68     BP: 127/71     Resp Rate: 16     O2 Sat (%): 93 %    Lines & Drains:     Urinary Catheter? No  Central Line? No  PICC Line? No    NG tube [] in [] removed [x] not applicable   Drains [] in [] removed [x] not applicable     Skin Integrity:      Wounds: no   Dressings Present: no    Wound Concerns: no      GI:    Current diet:  DIET ONE TIME MESSAGE  DIET CLEAR LIQUID    Nausea: NO  Vomiting: NO  Bowel Sounds: YES  Flatus: YES  Last Bowel Movement: several days ago    Respiratory:  Supplemental O2: No       Incentive Spirometer: YES    Coughing and Deep Breathing: YES  Oral Care: YES  Understanding (patient/family education): YES   Getting out of bed: YES  Head of bed elevation: YES    Patient Safety:    Falls Score: 1  Mobility Score: 1  Bed Alarm On? No  Sitter? No  Opportunity for questions and clarification was given to oncoming nurse. Patient bed is in low position, side rails are up x 2, door & observation blinds open as needed, call bell within reach and patient not in distress.     Sejal Mckeon

## 2017-07-26 NOTE — DISCHARGE INSTRUCTIONS
HOSPITALIST DISCHARGE INSTRUCTIONS    NAME: Adela Marsh   :  1955   MRN:  [id number]     Date/Time:  2017 12:02 PM    ADMIT DATE: 2017     DISCHARGE DATE: 2017     DISCHARGE DIAGNOSIS:  Obstructive Jaundice POA- improving slowly, atarax prn itching  Due to CBD stricture (on ERCP)- s/p Biliary stent ()- follow up brush biopsy results with Dr Sloane Thomson as outpatient for further management as recommended  Recent UTI POA- ruled out with neg Urine Cx this admission  HTN  S/P ablation for Afib  Sjogren's disease  Hyponatremia- mild, Eat some salt in diet for next few days then go back to salt restricted diet       Active Problems:    Obstructive jaundice (2017)      Common bile duct (CBD) obstruction (2017)      UTI (urinary tract infection) (2017)         MEDICATIONS:  As per medication reconciliation  list  · It is important that you take the medication exactly as they are prescribed. · Keep your medication in the bottles provided by the pharmacist and keep a list of the medication names, dosages, and times to be taken in your wallet. · Do not take other medications without consulting your doctor. Pain Management: per above medications    What to do at Home    Recommended diet:  Cardiac Diet, Eat some salt in diet for next few days then go back to salt restricted diet     Recommended activity: Activity as tolerated    If you have questions regarding the hospital related prescriptions or hospital related issues please call [facility] [phone].     If you experience any of the following symptoms then please call your primary care physician or return to the emergency room if you cannot get hold of your doctor:  Fever, chills, nausea, vomiting, diarrhea, change in mentation, falling, bleeding, shortness of breath,     Follow Up:  Dr Jaye Little (Gastroenterologist) in 1 week for repeat LFTs & results of Biopsy as recommended      Information obtained by :  I understand that if any problems occur once I am at home I am to contact my physician. I understand and acknowledge receipt of the instructions indicated above.                                                                                                                                            Physician's or R.N.'s Signature                                                                  Date/Time                                                                                                                                              Patient or Representative Signature                                                          Date/Time

## 2017-07-26 NOTE — PROGRESS NOTES
GI note:    CA 19-9 is normal.  CT pancreas shows no pancreas mass. He can probably go home today. F/u on brushings and LFTs in a week. F.u with Dr Albino Cortes or Erich Galo in our office in 2 weeks' time. Jose Luis Thomson MD

## 2017-07-26 NOTE — DISCHARGE SUMMARY
Hospitalist Discharge Summary     Patient ID:  Isa Britton  [id number]  25 y.o.  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  male with a history of Sjogren's, HTN and Afib who presents with new onset jaundice. Patient reports an episode of abdominal pain about a month ago which lasted about 2 hours and resolved completely. He reports seeing his PCP and was diagnosed with URI. Since then, he has noticed his urine turned darker and his stools lighter in color. The intensity of the color change fluctuated but never resolved. He saw his PCP 2 days ago and was started on Levaquin for a UTI. Today his skin looked yellow so he and his wife decided to come to the ER. Work up demonstrated Total Bili 14 and US abd : Distended common bile duct 1.6cm.  There is sludge and/or tiny stones in the gallbladder which also appears distended. Sludge and/or tiny stones in the gallbladder. The gallbladder appears distended. No wall thickening or pericholecystic fluid collections. No sonographic Dhaliwal's sign. \"       ______________________________________________________________________  DISCHARGE SUMMARY/HOSPITAL COURSE:  for full details see H&P, daily progress notes, labs, consult notes.      Obstructive Jaundice POA  Dilated CBD / GB sludge/tiny stone  Concern for malignancy vs choledocholithiasis  US abdomen: Distended common bile duct 1.6cm.  There is sludge and/or tiny stones in the gallbladder which also appears distended.  Sludge and/or tiny stones in the gallbladder. The gallbladder appears distended. No wall thickening or pericholecystic fluid collections. No  sonographic Dhaliwal's sign. CT A/P= Marked intrahepatic biliary dilatation and common bile duct  dilatation to the level of the mid common bile duct which is markedly narrowed. No discrete pancreatic mass visualized. Recommend ERCP for further evaluation     Continue NPO, IVF for now  Unable to get MRI due to metal in the spine per patient  CT A/P with contrast noted  To get ERCP with stone extraction vs EUS today evening  Cont rocephin for now        Recent UTI POA- ruled out now  Urine Cx neg  Switched Levaquin to IV Rocephin for now, plan to stop post procedure in 24 hrs      HTN  Continue altace      S/P ablation for Afib  Only on ASA which we are holding for now.   No longer on coumadin or any rate controlling agent.  No longer follows with cardiology.        Sjogren's disease  Not on meds      Hives / urticaria  Continue zyrtec with IV benadryl prn      Code Status:   Full  Surrogate Decision Maker: wife        _______________________________________________________________________  Patient seen and examined by me on discharge day. Pertinent Findings:  Gen:    Not in distress, scleral icterus noted +  Chest: Clear lungs  CVS:   Regular rhythm. No edema  Abd:  Soft, not distended, not tender  Neuro:  Alert, oriented x 3  _______________________________________________________________________  DISCHARGE MEDICATIONS:   Current Discharge Medication List      START taking these medications    Details   hydrOXYzine HCl (ATARAX) 50 mg tablet Take 1 Tab by mouth three (3) times daily as needed for Itching for up to 10 days.   Qty: 30 Tab, Refills: 0         CONTINUE these medications which have NOT CHANGED    Details   gabapentin (NEURONTIN) 300 mg capsule Take 900 mg by mouth two (2) times a day. 3 x 300mg caps (900mg) twice daily      ramipril (ALTACE) 10 mg capsule Take 10 mg by mouth daily. cholecalciferol (VITAMIN D3) 1,000 unit cap Take 1,000 Units by mouth daily. cyanocobalamin (VITAMIN B-12) 1,000 mcg/mL injection 1,000 mcg by IntraMUSCular route once. Indications: VITAMIN B12 DEFICIENCY, Twice a month         STOP taking these medications       levoFLOXacin (LEVAQUIN) 500 mg tablet Comments:   Reason for Stopping:               My Recommended Diet, Activity, Wound Care, and follow-up labs are listed in the patient's Discharge Insturctions which I have personally completed and reviewed.     _______________________________________________________________________  DISPOSITION:    Home with Family: x   Home with HH/PT/OT/RN:    SNF/LTC:    [facility]:    OTHER:        Condition at Discharge:  Stable  _______________________________________________________________________  Follow up with:   PCP : None  Follow-up Information     Follow up With Details Comments Contact Info    None   None ([phone]) Patient stated that they have no PCP                Total time in minutes spent coordinating this discharge (includes going over instructions, follow-up, prescriptions, and preparing report for sign off to her PCP) :  35 minutes    Signed:  Anabella Marquez MD

## 2017-07-26 NOTE — PROGRESS NOTES
Discharge instructions given to patient and patient wife. Both denied further questions for RN or MD. Peripheral IV removed by RN. Pt taken in wheelchair by volunteer to meet wife at front entrance. All of pt belongings with the patient.

## 2017-07-27 ENCOUNTER — PATIENT OUTREACH (OUTPATIENT)
Dept: OTHER | Age: 62
End: 2017-07-27

## 2017-07-27 NOTE — PROGRESS NOTES
Patient on report as with discharge from hospital stay for obstructive jaundice with ERCP and stent placement  07/23 - 07/26. Initial attempt to contact patient for transitions of care. Left discreet message on voicemail with this CM contact information. Will attempt to contact again. Chart review:   Notes indicated he was seen by PCP and placed on Levoquin for UTI priorr ot admission, but discharge indicates no PCP. Will follow and offer PCP options and assistance. When contacted. Follow-up Information     Follow up With Details Comments Contact Info     Jose Luis Cuevas MD In 1 week LFT recheck and biopsy results Jeovany Abraham  [address] [facility]  [address] [facility]      Your Appointments            Monday August 21, 2017  9:30 AM EDT   PHYSICAL PRE OP with [address] [place] [address] Drive, [address] [place] [address])     [address] [facility]  [address]   [phone]                                         Current Discharge Medication List       START taking these medications       Dose & Instructions Dispensing Information Comments   Morning Noon Evening Bedtime     hydrOXYzine HCl 50 mg tablet   Commonly known as:  ATARAX        Your last dose was:          Your next dose is:                 Dose:  50 mg   Take 1 Tab by mouth three (3) times daily as needed for Itching for up to 10 days.     Quantity:  30 Tab   Refills:  0                                      CONTINUE these medications which have CHANGED       Dose & Instructions Dispensing Information Comments   Morning Noon Evening Bedtime     gabapentin 300 mg capsule   Commonly known as:  NEURONTIN   What changed:  Another medication with the same name was removed. Continue taking this medication, and follow the directions you see here.        Your last dose was:          Your next dose is:                 Dose:  900 mg   Take 900 mg by mouth two (2) times a day.  3 x 300mg caps (900mg) twice daily     Refills:  0                                      CONTINUE these medications which have NOT CHANGED       Dose & Instructions Dispensing Information Comments   Morning Noon Evening Bedtime     ramipril 10 mg capsule   Commonly known as:  ALTACE        Your last dose was:          Your next dose is:                 Dose:  10 mg   Take 10 mg by mouth daily.     Refills:  0                                     VITAMIN B-12 1,000 mcg/mL injection   Generic drug:  cyanocobalamin        Your last dose was:          Your next dose is:                 Dose:  1000 mcg   1,000 mcg by IntraMUSCular route once.  Indications: VITAMIN B12 DEFICIENCY, Twice a month     Refills:  0                                     VITAMIN D3 1,000 unit Cap   Generic drug:  cholecalciferol        Your last dose was:          Your next dose is:                 Dose:  1000 Units   Take 1,000 Units by mouth daily.     Refills:  0                                      STOP taking these medications           levoFLOXacin 500 mg tablet   Commonly known as:  Lory Bring

## 2017-07-28 ENCOUNTER — HOSPITAL ENCOUNTER (EMERGENCY)
Age: 62
Discharge: HOME | End: 2017-07-28
Attending: EMERGENCY MEDICINE
Payer: COMMERCIAL

## 2017-07-28 ENCOUNTER — PATIENT OUTREACH (OUTPATIENT)
Dept: OTHER | Age: 62
End: 2017-07-28

## 2017-07-28 VITALS
HEART RATE: 71 BPM | DIASTOLIC BLOOD PRESSURE: 95 MMHG | SYSTOLIC BLOOD PRESSURE: 134 MMHG | HEIGHT: 70 IN | TEMPERATURE: 97.8 F | RESPIRATION RATE: 14 BRPM | OXYGEN SATURATION: 96 % | WEIGHT: 196.43 LBS | BODY MASS INDEX: 28.12 KG/M2

## 2017-07-28 DIAGNOSIS — E80.6 HYPERBILIRUBINEMIA: Primary | ICD-10-CM

## 2017-07-28 DIAGNOSIS — L29.9 PRURITUS: ICD-10-CM

## 2017-07-28 LAB
ALBUMIN SERPL BCP-MCNC: 3.3 G/DL (ref 3.5–5)
ALBUMIN/GLOB SERPL: 0.9 {RATIO} (ref 1.1–2.2)
ALP SERPL-CCNC: 335 U/L (ref 45–117)
ALT SERPL-CCNC: 124 U/L (ref 12–78)
ANION GAP BLD CALC-SCNC: 8 MMOL/L (ref 5–15)
AST SERPL W P-5'-P-CCNC: 51 U/L (ref 15–37)
ATRIAL RATE: 77 BPM
BASOPHILS # BLD AUTO: 0.1 K/UL (ref 0–0.1)
BASOPHILS # BLD: 1 % (ref 0–1)
BILIRUB SERPL-MCNC: 6.7 MG/DL (ref 0.2–1)
BUN SERPL-MCNC: 15 MG/DL (ref 6–20)
BUN/CREAT SERPL: 16 (ref 12–20)
CALCIUM SERPL-MCNC: 9.2 MG/DL (ref 8.5–10.1)
CALCULATED P AXIS, ECG09: 60 DEGREES
CALCULATED R AXIS, ECG10: -33 DEGREES
CALCULATED T AXIS, ECG11: 7 DEGREES
CHLORIDE SERPL-SCNC: 97 MMOL/L (ref 97–108)
CO2 SERPL-SCNC: 28 MMOL/L (ref 21–32)
CREAT SERPL-MCNC: 0.93 MG/DL (ref 0.7–1.3)
DIAGNOSIS, 93000: NORMAL
EOSINOPHIL # BLD: 0.7 K/UL (ref 0–0.4)
EOSINOPHIL NFR BLD: 12 % (ref 0–7)
ERYTHROCYTE [DISTWIDTH] IN BLOOD BY AUTOMATED COUNT: 15.5 % (ref 11.5–14.5)
GLOBULIN SER CALC-MCNC: 3.7 G/DL (ref 2–4)
GLUCOSE SERPL-MCNC: 135 MG/DL (ref 65–100)
HCT VFR BLD AUTO: 47.6 % (ref 36.6–50.3)
HGB BLD-MCNC: 16.1 G/DL (ref 12.1–17)
LIPASE SERPL-CCNC: 187 U/L (ref 73–393)
LYMPHOCYTES # BLD AUTO: 25 % (ref 12–49)
LYMPHOCYTES # BLD: 1.4 K/UL (ref 0.8–3.5)
MCH RBC QN AUTO: 31 PG (ref 26–34)
MCHC RBC AUTO-ENTMCNC: 33.8 G/DL (ref 30–36.5)
MCV RBC AUTO: 91.7 FL (ref 80–99)
MONOCYTES # BLD: 0.3 K/UL (ref 0–1)
MONOCYTES NFR BLD AUTO: 6 % (ref 5–13)
NEUTS SEG # BLD: 3.2 K/UL (ref 1.8–8)
NEUTS SEG NFR BLD AUTO: 56 % (ref 32–75)
P-R INTERVAL, ECG05: 160 MS
PLATELET # BLD AUTO: 203 K/UL (ref 150–400)
POTASSIUM SERPL-SCNC: 4.2 MMOL/L (ref 3.5–5.1)
PROT SERPL-MCNC: 7 G/DL (ref 6.4–8.2)
Q-T INTERVAL, ECG07: 398 MS
QRS DURATION, ECG06: 94 MS
QTC CALCULATION (BEZET), ECG08: 450 MS
RBC # BLD AUTO: 5.19 M/UL (ref 4.1–5.7)
SODIUM SERPL-SCNC: 133 MMOL/L (ref 136–145)
VENTRICULAR RATE, ECG03: 77 BPM
WBC # BLD AUTO: 5.6 K/UL (ref 4.1–11.1)

## 2017-07-28 PROCEDURE — 36415 COLL VENOUS BLD VENIPUNCTURE: CPT | Performed by: EMERGENCY MEDICINE

## 2017-07-28 PROCEDURE — 80053 COMPREHEN METABOLIC PANEL: CPT | Performed by: EMERGENCY MEDICINE

## 2017-07-28 PROCEDURE — 99283 EMERGENCY DEPT VISIT LOW MDM: CPT

## 2017-07-28 PROCEDURE — 96361 HYDRATE IV INFUSION ADD-ON: CPT

## 2017-07-28 PROCEDURE — 85025 COMPLETE CBC W/AUTO DIFF WBC: CPT | Performed by: EMERGENCY MEDICINE

## 2017-07-28 PROCEDURE — 96375 TX/PRO/DX INJ NEW DRUG ADDON: CPT

## 2017-07-28 PROCEDURE — 74011000250 HC RX REV CODE- 250: Performed by: EMERGENCY MEDICINE

## 2017-07-28 PROCEDURE — 96372 THER/PROPH/DIAG INJ SC/IM: CPT

## 2017-07-28 PROCEDURE — 83690 ASSAY OF LIPASE: CPT | Performed by: EMERGENCY MEDICINE

## 2017-07-28 PROCEDURE — 74011250636 HC RX REV CODE- 250/636: Performed by: EMERGENCY MEDICINE

## 2017-07-28 PROCEDURE — 96374 THER/PROPH/DIAG INJ IV PUSH: CPT

## 2017-07-28 RX ORDER — HYDROXYZINE 50 MG/ML
50 INJECTION, SOLUTION INTRAMUSCULAR
Status: COMPLETED | OUTPATIENT
Start: 2017-07-28 | End: 2017-07-28

## 2017-07-28 RX ORDER — SODIUM CHLORIDE 0.9 % (FLUSH) 0.9 %
5-10 SYRINGE (ML) INJECTION EVERY 8 HOURS
Status: DISCONTINUED | OUTPATIENT
Start: 2017-07-28 | End: 2017-07-28 | Stop reason: HOSPADM

## 2017-07-28 RX ORDER — CHOLESTYRAMINE 4 G/9G
4 POWDER, FOR SUSPENSION ORAL 2 TIMES DAILY
Qty: 1 CAN | Refills: 1 | Status: SHIPPED | OUTPATIENT
Start: 2017-07-28 | End: 2017-08-31

## 2017-07-28 RX ORDER — DIPHENHYDRAMINE HYDROCHLORIDE 50 MG/ML
25 INJECTION, SOLUTION INTRAMUSCULAR; INTRAVENOUS
Status: COMPLETED | OUTPATIENT
Start: 2017-07-28 | End: 2017-07-28

## 2017-07-28 RX ORDER — SODIUM CHLORIDE 0.9 % (FLUSH) 0.9 %
5-10 SYRINGE (ML) INJECTION AS NEEDED
Status: DISCONTINUED | OUTPATIENT
Start: 2017-07-28 | End: 2017-07-28 | Stop reason: HOSPADM

## 2017-07-28 RX ADMIN — HYDROXYZINE HYDROCHLORIDE 50 MG: 50 INJECTION, SOLUTION INTRAMUSCULAR at 08:48

## 2017-07-28 RX ADMIN — DIPHENHYDRAMINE HYDROCHLORIDE 25 MG: 50 INJECTION, SOLUTION INTRAMUSCULAR; INTRAVENOUS at 08:44

## 2017-07-28 RX ADMIN — SODIUM CHLORIDE 1000 ML: 900 INJECTION, SOLUTION INTRAVENOUS at 08:41

## 2017-07-28 RX ADMIN — FAMOTIDINE 20 MG: 10 INJECTION, SOLUTION INTRAVENOUS at 08:42

## 2017-07-28 NOTE — ED NOTES
Per wife, patient reported to ED with hyper-bilirubin (07.23.17) Sunday. Stent placed on Tuesday because of a stricture in common biliary duct. Hyper-bilirubin has caused an increase in itching which has become unbearable. Patient was prescribed Hydroxyzine (Atarax) and has received no relief. Patient resting in stretcher, constantly scratching his arms. Wife at bedside, call bell within reach. Dr. Martin Jean-Baptiste at bedside.

## 2017-07-28 NOTE — LETTER
7/28/2017 3:12 PM 
 
Mr. Karina Hardin [address] [address] [employer] [facility] [address] Dear  Jonnie, 
 
My name is Jane Leong , Employee Care Manager for [employer], and I have been trying to reach you. The Employee Care  is a free-of-charge, confidential service provided to our employees and their family members covered by the [employer]. Part of my job is to follow up with members who have recently been in the hospital or emergency room, to help them coordinate their care and answer questions they may have about their visit. I am able to provide assistance with medication questions, scheduling needed follow-up appointments, and arranging services like home health or home medical equipment. I can also provide education regarding your hospital or ER visit as well as your medical conditions. As healthcare providers, we know that patients do better when they have close follow up with a primary care provider (PCP), especially after a hospital or emergency department visit. If you do not have a PCP, I can help you find one that is convenient to you and covered by your insurance. I can also help you understand any after visit instructions, such as what symptoms to watch out for, or any new or changed medications. Remember that you can access your After Visit Summary by logging into your [employer] account. If you do not have a [employer] account, I can help you request access. Our program is designed to provide you with the opportunity to have a [employer] care manager partner with you for your healthcare needs. Please contact me at the below number if I can provide you with assistance for any of the above services.  
 
 
Sincerely, 
 
 
 
 
Jane Leong RN, [place] [address], [address] [address]  
Phone:  [phone]     Fax:  [phone]    [email or web address]

## 2017-07-28 NOTE — ED PROVIDER NOTES
HPI Comments: Anjali Christianson is a 62 y.o. male with hx of HTN, cardiac ablation, and Sjogren's syndrome  who presents ambulatory to the ED c/o worsening generalized itching x three weeks and elevated bilirubin levels. Pt returns to the ED from hospitalization since 7/23/17, presenting to the ED with jaundiced skin and found to have bilirubin 14 and common biliary obstruction. A stent was placed due to a common biliary stricture on 7/25/17 and pt was discharged 7/26/17 with a prescription of atarax. Per wife, pt has been taking atarax and benadryl without relief. Pt reports last dose of vistaril was 12 AM last night. She also notes pt has been applying lotion and using soaps without relief. Pt specifically denies fever, chills, sore throat, rhinorrhea, SOB, CP, abdominal pain, dizziness, lightheadedness, numbness and HA. Social hx: - Tobacco use, - EtOH use, - Illicit drug use    PCP: None    There are no other complaints, changes or physical findings at this time. The history is provided by the patient and the spouse. No  was used. Past Medical History:   Diagnosis Date    Autoimmune disease ([address]     Hypertension     Ill-defined condition     Previous a.fib, ablation, NSR now       Past Surgical History:   Procedure Laterality Date    CARDIAC SURG PROCEDURE UNLIST      Ablation 2005    HX ORTHOPAEDIC      Spinal fusion     NEUROLOGICAL PROCEDURE UNLISTED      Dami Cane hole washout from cerebral hemorrhage         Family History:   Problem Relation Age of Onset    Cancer Father      Breast and Colon       Social History     Social History    Marital status:      Spouse name: N/A    Number of children: N/A    Years of education: N/A     Occupational History    Not on file.      Social History Main Topics    Smoking status: Never Smoker    Smokeless tobacco: Never Used    Alcohol use No    Drug use: Yes     Special: Prescription, OTC    Sexual activity: Not on file Other Topics Concern    Not on file     Social History Narrative         ALLERGIES: Pcn [penicillins]    Review of Systems   Constitutional: Negative. Negative for chills and fever. HENT: Negative. Negative for congestion, rhinorrhea and sinus pressure. Eyes: Negative. Negative for discharge and redness. Respiratory: Negative. Negative for chest tightness and shortness of breath. Cardiovascular: Negative. Negative for chest pain. Gastrointestinal: Negative. Negative for abdominal pain and blood in stool. Endocrine: Negative. Genitourinary: Negative. Negative for flank pain and hematuria. Musculoskeletal: Negative. Negative for back pain. Skin: Negative for rash. Positive for generalized itching. Neurological: Negative. Negative for dizziness, seizures, weakness, numbness and headaches. Hematological: Negative. All other systems reviewed and are negative. Patient Vitals for the past 12 hrs:   Temp Pulse Resp BP SpO2   07/28/17 0945 - 71 14 (!) 134/95 97 %   07/28/17 0815 97.8 °F (36.6 °C) 76 12 (!) 149/93 97 %       Physical Exam   Constitutional: He is oriented to person, place, and time. He appears well-developed and well-nourished. No distress. HENT:   Head: Normocephalic and atraumatic. Nose: Nose normal.   Mouth/Throat: No oropharyngeal exudate. Eyes: Conjunctivae and EOM are normal. Pupils are equal, round, and reactive to light. No scleral icterus. Neck: Normal range of motion. Neck supple. No JVD present. No thyromegaly present. Cardiovascular: Normal rate, regular rhythm, normal heart sounds, intact distal pulses and normal pulses. PMI is not displaced. Exam reveals no gallop and no friction rub. No murmur heard. Pulmonary/Chest: Effort normal and breath sounds normal. No stridor. No respiratory distress. He has no decreased breath sounds. He has no wheezes. He has no rhonchi. He has no rales. He exhibits no tenderness. Abdominal: Soft. Normal aorta and bowel sounds are normal. He exhibits no distension, no abdominal bruit and no mass. There is no hepatosplenomegaly. There is no tenderness. There is no rebound, no guarding and no CVA tenderness. No hernia. Neurological: He is alert and oriented to person, place, and time. He has normal reflexes. He displays no atrophy and no tremor. No cranial nerve deficit or sensory deficit. He exhibits normal muscle tone. He displays no seizure activity. Coordination normal. GCS eye subscore is 4. GCS verbal subscore is 5. GCS motor subscore is 6. Reflex Scores:       Patellar reflexes are 2+ on the right side and 2+ on the left side. Skin: Skin is warm. Rash noted. Rash is maculopapular. He is not diaphoretic. No erythema. Multiple area of excoriation     Nursing note and vitals reviewed. MDM  Number of Diagnoses or Management Options  Hyperbilirubinemia:   Pruritus:   Diagnosis management comments: DDx: hyperbilirubinemia, pruritus, liver failure, electrolyte abnormality    Impression/plan: pt with recent common bowel obstruction and jaundice returns to the ER complaining of pruritus. No relief with antihistamines at home. Will recheck labs. Have reviewed up to date and pt may be candidate for bile acid sequestering agents. Will discuss results with Dr. Preston Post. Will make final disposition pending that discussion.         Amount and/or Complexity of Data Reviewed  Clinical lab tests: reviewed and ordered  Obtain history from someone other than the patient: yes (spouse)  Review and summarize past medical records: yes  Discuss the patient with other providers: yes (GI)    Risk of Complications, Morbidity, and/or Mortality  Presenting problems: moderate  Diagnostic procedures: low  Management options: low    Patient Progress  Patient progress: stable    ED Course       Procedures    CONSULT NOTE:   10:29 AM  Kei Rivera MD spoke with Dr. Montserrat Collins,  Specialty: GI  Discussed pt's hx, disposition, and available diagnostic and imaging results. Reviewed care plans. Consultant agrees with plans as outlined. Dr. Sloane Thomson recommends Margo Bloch 4 mg and follow up to office as needed. Written by RADHA Narvaez, as dictated by Dusty Ormond, MD.    9:21 AM   Reevaluated pt. He reports itching has improved but is still present. LABORATORY TESTS:  Recent Results (from the past 12 hour(s))   CBC WITH AUTOMATED DIFF    Collection Time: 07/28/17  8:40 AM   Result Value Ref Range    WBC 5.6 4.1 - 11.1 K/uL    RBC 5.19 4. 10 - 5.70 M/uL    HGB 16.1 12.1 - 17.0 g/dL    HCT 47.6 36.6 - 50.3 %    MCV 91.7 80.0 - 99.0 FL    MCH 31.0 26.0 - 34.0 PG    MCHC 33.8 30.0 - 36.5 g/dL    RDW 15.5 (H) 11.5 - 14.5 %    PLATELET 679 728 - 303 K/uL    NEUTROPHILS 56 32 - 75 %    LYMPHOCYTES 25 12 - 49 %    MONOCYTES 6 5 - 13 %    EOSINOPHILS 12 (H) 0 - 7 %    BASOPHILS 1 0 - 1 %    ABS. NEUTROPHILS 3.2 1.8 - 8.0 K/UL    ABS. LYMPHOCYTES 1.4 0.8 - 3.5 K/UL    ABS. MONOCYTES 0.3 0.0 - 1.0 K/UL    ABS. EOSINOPHILS 0.7 (H) 0.0 - 0.4 K/UL    ABS. BASOPHILS 0.1 0.0 - 0.1 K/UL   METABOLIC PANEL, COMPREHENSIVE    Collection Time: 07/28/17  8:40 AM   Result Value Ref Range    Sodium 133 (L) 136 - 145 mmol/L    Potassium 4.2 3.5 - 5.1 mmol/L    Chloride 97 97 - 108 mmol/L    CO2 28 21 - 32 mmol/L    Anion gap 8 5 - 15 mmol/L    Glucose 135 (H) 65 - 100 mg/dL    BUN 15 6 - 20 MG/DL    Creatinine 0.93 0.70 - 1.30 MG/DL    BUN/Creatinine ratio 16 12 - 20      GFR est AA >60 >60 ml/min/1.73m2    GFR est non-AA >60 >60 ml/min/1.73m2    Calcium 9.2 8.5 - 10.1 MG/DL    Bilirubin, total 6.7 (H) 0.2 - 1.0 MG/DL    ALT (SGPT) 124 (H) 12 - 78 U/L    AST (SGOT) 51 (H) 15 - 37 U/L    Alk.  phosphatase 335 (H) 45 - 117 U/L    Protein, total 7.0 6.4 - 8.2 g/dL    Albumin 3.3 (L) 3.5 - 5.0 g/dL    Globulin 3.7 2.0 - 4.0 g/dL    A-G Ratio 0.9 (L) 1.1 - 2.2     LIPASE    Collection Time: 07/28/17  8:44 AM   Result Value Ref Range    Lipase 187 73 - 393 U/L         MEDICATIONS GIVEN:  Medications   sodium chloride (NS) flush 5-10 mL (not administered)   sodium chloride (NS) flush 5-10 mL (not administered)   sodium chloride 0.9 % bolus infusion 1,000 mL (1,000 mL IntraVENous New Bag 7/28/17 0841)   diphenhydrAMINE (BENADRYL) injection 25 mg (25 mg IntraVENous Given 7/28/17 0844)   famotidine (PF) (PEPCID) 20 mg in sodium chloride 0.9 % 10 mL injection (20 mg IntraVENous Given 7/28/17 0842)   hydrOXYzine (VISTARIL) injection 50 mg (50 mg IntraMUSCular Given 7/28/17 0848)       IMPRESSION:  1. Hyperbilirubinemia    2. Pruritus        PLAN: Discharge home  1. Current Discharge Medication List      START taking these medications    Details   cholestyramine-sucrose (QUESTRAN) 4 gram powder Take 4 g by mouth two (2) times a day. Take 1 hour before other medications or meals  Qty: 1 Can, Refills: 1           2. Follow-up Information     Follow up With Details Comments Contact Nicole Hall MD Call in 3 days If symptoms worsen [facility]  [phone]      [facility] EMERGENCY DEPT  If symptoms worsen [address] [place] [address]  [phone]        3. Return to ED if worse     DISCHARGE NOTE  10:33 AM  The patient has been re-evaluated and is ready for discharge. Reviewed available results with patient. Counseled pt on diagnosis and care plan. Pt has expressed understanding, and all questions have been answered. Pt agrees with plan and agrees to F/U as recommended, or return to the ED if their sxs worsen. Discharge instructions have been provided and explained to the pt, along with reasons to return to the ED. This note is prepared by Collette Ng, acting as Scribe for Tobi Hernandez MD.    Tobi Hernandez MD: The scribe's documentation has been prepared under my direction and personally reviewed by me in its entirety.  I confirm that the note above accurately reflects all work, treatment, procedures, and medical decision making performed by me.

## 2017-07-28 NOTE — PROGRESS NOTES
Patient on report as with discharge from ED visit / - Noted that this patient is currently in the ED. Second attempt to contact patient for transitions of care. Left discreet message on voicemail with this CM contact information.     Unable to reach letter sent via Mail    Next attempt at outreach will be Monday 7/31

## 2017-07-28 NOTE — DISCHARGE INSTRUCTIONS
[employer] Activation    Thank you for requesting access to [employer]. Please follow the instructions below to securely access and download your online medical record. [employer] allows you to send messages to your doctor, view your test results, renew your prescriptions, schedule appointments, and more. How Do I Sign Up? 1. In your internet browser, go to [email or web address]  2. Click on the First Time User? Click Here link in the Sign In box. You will be redirect to the New Member Sign Up page. 3. Enter your [employer] Access Code exactly as it appears below. You will not need to use this code after youve completed the sign-up process. If you do not sign up before the expiration date, you must request a new code. [employer] Access Code: [email or web address]  Expires: 10/24/2017 12:17 PM (This is the date your [employer] access code will )    4. Enter the last four digits of your Social Security Number (xxxx) and Date of Birth (mm/dd/yyyy) as indicated and click Submit. You will be taken to the next sign-up page. 5. Create a [employer] ID. This will be your [employer] login ID and cannot be changed, so think of one that is secure and easy to remember. 6. Create a [employer] password. You can change your password at any time. 7. Enter your Password Reset Question and Answer. This can be used at a later time if you forget your password. 8. Enter your e-mail address. You will receive e-mail notification when new information is available in [address] 9. Click Sign Up. You can now view and download portions of your medical record. 10. Click the Download Summary menu link to download a portable copy of your medical information. Additional Information    If you have questions, please visit the Frequently Asked Questions section of the [employer] website at [email or web address] Remember, [employer] is NOT to be used for urgent needs. For medical emergencies, dial 911.

## 2017-07-31 ENCOUNTER — PATIENT OUTREACH (OUTPATIENT)
Dept: OTHER | Age: 62
End: 2017-07-31

## 2017-07-31 NOTE — PROGRESS NOTES
[facility] NOTE:     Mr. Colby Bautista  has been contacted regarding recent IP stay DC  (ERCP/stent- obstructive jaundice) and ED visit  for itching (hyperbili) . Verified  and address for HIPAA security. Explained role and verified PCP. Discharge medications were reviewed with the patient by telephone. *Taking stool softener with Questran /  Taking Atarax TID as ordered. Encouraged extra fiber (benefiber/metamucil). Hydrating well. Understands risk of constipations. - He does note darker stools (not black or tarry; no residual on wiping or frequency)- noted earlier stools were pale prior ot Questran,  No abd cramping/ no N/V. Stool is formed and does not turn toilet water red. * Former PCP in [place] -  transferring to [facility] PCP - new pt apt in 3 weeks. * His wife is RN/ instructor in nursing school and is on top of his condition and needs. * Works for himself and is taking time off until condition improves. Date of IP/ ED Admission:     DC   ED    Facility patient visited:   [facility]   Reason for Visit:   Obstructive jaundice  /  itching   Reviewed scripts given by ED? Yes      Able to obtain prescribed medication? Yes   How is the patient feeling? Pt stated he is itching less - but still needs atarax tid. Drinking plenty of water- taking stool softener. No abd cramping/ constipation. Does the patient have any questions or problems? Not at this time   Any barriers with transportation? no   Follow-up Appointment date: Yes -  New PCP in 3 weeks;   [facility] GI in 1 week. Reviewed Discharge instructions and Red Flags:  Per AVS       Provided patient with my name and contact information and instructed patient if there are any question to call. No further needs at this time. FU [place] 2 weeks. START taking these medications     Details   cholestyramine-sucrose (QUESTRAN) 4 gram powder Take 4 g by mouth two (2) times a day.  Take 1 hour before other medications or meals  Qty: 1 Can, Refills: 1              2.    Follow-up Information     Follow up With Details Comments Contact Info     Israel Vazquez MD Call in 3 days If symptoms worsen Dorothy Balderas  [phone]

## 2017-07-31 NOTE — PATIENT INSTRUCTIONS
Cholestyramine (By mouth)   Cholestyramine (koe-le-STYE-ra-meen)  Lowers high cholesterol levels. Also treats severe itching caused by liver disease. Brand Name(s): Prevalite, Questran, Questran Light   There may be other brand names for this medicine. When This Medicine Should Not Be Used: You should not use this medicine if you have had an allergic reaction to cholestyramine. How to Use This Medicine:   Powder for Suspension, Packet  · Your doctor will tell you how much medicine to use. Do not use more than directed. · Take this medicine just before eating or with meals. · You should not use the powder in dry form. Mix in 4 to 6 ounces of water, fruit juice, or milk until completely dissolved. Drink this mixture right away. Rinse the glass with more liquid and drink that too, so you get all of the medicine. · You may also mix the powder medicine in thin soup, with soft fruits such as fruit cocktail or crushed pineapple, or with milk in breakfast cereal.  · Drink several glasses of liquids each day while using this medicine. If a dose is missed:   · Take the missed dose as soon as possible. · Skip the missed dose if it is almost time for your next regular dose. · You should not use two doses at the same time. How to Store and Dispose of This Medicine:   · Store at room temperature, away from heat, moisture, and direct light. · Keep all medicine out of the reach of children. Drugs and Foods to Avoid:   Ask your doctor or pharmacist before using any other medicine, including over-the-counter medicines, vitamins, and herbal products. · Make sure your doctor knows if you are also using Coumadin®. · Cholestyramine may change the way many other medicines work. Take other medicines 1 hour before or 4 to 6 hours after you take cholestyramine. · Talk with your doctor or pharmacist if you need help deciding the best time to take your medicines.   Warnings While Using This Medicine:   · Make sure your doctor knows if you are pregnant or breastfeeding, or if you have thyroid problems, diabetes, gallbladder disease, stomach problems, heart disease, kidney disease, or liver disease. · Questran Light® contains aspartame (Nutrasweet®). If you have phenylketonuria you should avoid using this product. · Before you stop using this medicine, talk with your doctor. · This medicine is used with diet, exercise, and weight loss in overweight patients to control high cholesterol levels. To lower and keep your cholesterol normal, all of these are important. Possible Side Effects While Using This Medicine:   Call your doctor right away if you notice any of these side effects:  · Rash or hives  · Wheezing or trouble breathing. · Unusual bleeding such as nosebleeds or bleeding gums  · Severe stomach pain  If you notice these less serious side effects, talk with your doctor:   · Constipation  · Upset stomach  · Heartburn, gas or bloating  If you notice other side effects that you think are caused by this medicine, tell your doctor. Call your doctor for medical advice about side effects. You may report side effects to FDA at 9-614-FDA-5900  © 2017 [facility] [place] [facility] [place] Information is for End User's use only and may not be sold, redistributed or otherwise used for commercial purposes. The above information is an  only. It is not intended as medical advice for individual conditions or treatments. Talk to your doctor, nurse or pharmacist before following any medical regimen to see if it is safe and effective for you.

## 2017-08-10 ENCOUNTER — PATIENT OUTREACH (OUTPATIENT)
Dept: OTHER | Age: 62
End: 2017-08-10

## 2017-08-10 NOTE — PROGRESS NOTES
[facility] follow up. Patient on with ED visit / Inpatient stay for obstructive jaundice. Chart review:  PCP new pt apt in 3 weeks with [facility] physician practice     Contacted patient for transitions of care services Follow up. Verified  and address for HIPAA security. * Mr. Magalis Kemp reports FU  ERCP next  to check for residual blockage or other problems. 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 - Encouraged hydration. His wife makes him drink Gatorade. Cautioned for glucose load with Gatorade / and reduced glucose variety of hydration is recommended. * Feeling 'back to normal' now and has no residual pain, tiredness or jaudice at this time. * Introduced ECM services beyond [facility] after a month if interested. I asked him to discusstith his wife for what needs he may have for health promotion or health issues. Will follow for [facility] services. Next call:  2 weeks following next ERCP.

## 2017-08-18 ENCOUNTER — APPOINTMENT (OUTPATIENT)
Dept: GENERAL RADIOLOGY | Age: 62
End: 2017-08-18
Payer: COMMERCIAL

## 2017-08-18 ENCOUNTER — HOSPITAL ENCOUNTER (OUTPATIENT)
Age: 62
Setting detail: OUTPATIENT SURGERY
Discharge: HOME | End: 2017-08-18
Attending: INTERNAL MEDICINE | Admitting: INTERNAL MEDICINE
Payer: COMMERCIAL

## 2017-08-18 ENCOUNTER — ANESTHESIA (OUTPATIENT)
Dept: ENDOSCOPY | Age: 62
End: 2017-08-18
Payer: COMMERCIAL

## 2017-08-18 ENCOUNTER — ANESTHESIA EVENT (OUTPATIENT)
Dept: ENDOSCOPY | Age: 62
End: 2017-08-18
Payer: COMMERCIAL

## 2017-08-18 VITALS
DIASTOLIC BLOOD PRESSURE: 72 MMHG | RESPIRATION RATE: 16 BRPM | SYSTOLIC BLOOD PRESSURE: 127 MMHG | TEMPERATURE: 97.6 F | HEIGHT: 69 IN | BODY MASS INDEX: 29.56 KG/M2 | WEIGHT: 199.56 LBS | HEART RATE: 76 BPM | OXYGEN SATURATION: 97 %

## 2017-08-18 PROCEDURE — 76040000019: Performed by: INTERNAL MEDICINE

## 2017-08-18 PROCEDURE — 77030003406 HC NDL ASPIR BIOP OCOA -C: Performed by: INTERNAL MEDICINE

## 2017-08-18 PROCEDURE — 76060000031 HC ANESTHESIA FIRST 0.5 HR: Performed by: INTERNAL MEDICINE

## 2017-08-18 PROCEDURE — 74011000250 HC RX REV CODE- 250

## 2017-08-18 PROCEDURE — 77030019957 HC CUF BLN GASTSCP OCOA -B: Performed by: INTERNAL MEDICINE

## 2017-08-18 PROCEDURE — 74011250636 HC RX REV CODE- 250/636

## 2017-08-18 PROCEDURE — 74011250636 HC RX REV CODE- 250/636: Performed by: INTERNAL MEDICINE

## 2017-08-18 RX ORDER — SODIUM CHLORIDE 0.9 % (FLUSH) 0.9 %
5-10 SYRINGE (ML) INJECTION EVERY 8 HOURS
Status: DISCONTINUED | OUTPATIENT
Start: 2017-08-18 | End: 2017-08-18 | Stop reason: HOSPADM

## 2017-08-18 RX ORDER — DEXTROMETHORPHAN/PSEUDOEPHED 2.5-7.5/.8
1.2 DROPS ORAL
Status: DISCONTINUED | OUTPATIENT
Start: 2017-08-18 | End: 2017-08-18 | Stop reason: HOSPADM

## 2017-08-18 RX ORDER — FLUMAZENIL 0.1 MG/ML
0.2 INJECTION INTRAVENOUS
Status: DISCONTINUED | OUTPATIENT
Start: 2017-08-18 | End: 2017-08-18 | Stop reason: HOSPADM

## 2017-08-18 RX ORDER — EPINEPHRINE 0.1 MG/ML
1 INJECTION INTRACARDIAC; INTRAVENOUS
Status: DISCONTINUED | OUTPATIENT
Start: 2017-08-18 | End: 2017-08-18 | Stop reason: HOSPADM

## 2017-08-18 RX ORDER — SODIUM CHLORIDE 0.9 % (FLUSH) 0.9 %
5-10 SYRINGE (ML) INJECTION AS NEEDED
Status: DISCONTINUED | OUTPATIENT
Start: 2017-08-18 | End: 2017-08-18 | Stop reason: HOSPADM

## 2017-08-18 RX ORDER — ATROPINE SULFATE 0.1 MG/ML
0.5 INJECTION INTRAVENOUS
Status: DISCONTINUED | OUTPATIENT
Start: 2017-08-18 | End: 2017-08-18 | Stop reason: HOSPADM

## 2017-08-18 RX ORDER — LIDOCAINE HYDROCHLORIDE 20 MG/ML
INJECTION, SOLUTION EPIDURAL; INFILTRATION; INTRACAUDAL; PERINEURAL AS NEEDED
Status: DISCONTINUED | OUTPATIENT
Start: 2017-08-18 | End: 2017-08-18 | Stop reason: HOSPADM

## 2017-08-18 RX ORDER — MIDAZOLAM HYDROCHLORIDE 1 MG/ML
1-3 INJECTION, SOLUTION INTRAMUSCULAR; INTRAVENOUS
Status: DISCONTINUED | OUTPATIENT
Start: 2017-08-18 | End: 2017-08-18 | Stop reason: HOSPADM

## 2017-08-18 RX ORDER — PROPOFOL 10 MG/ML
INJECTION, EMULSION INTRAVENOUS AS NEEDED
Status: DISCONTINUED | OUTPATIENT
Start: 2017-08-18 | End: 2017-08-18 | Stop reason: HOSPADM

## 2017-08-18 RX ORDER — NALOXONE HYDROCHLORIDE 0.4 MG/ML
0.4 INJECTION, SOLUTION INTRAMUSCULAR; INTRAVENOUS; SUBCUTANEOUS
Status: DISCONTINUED | OUTPATIENT
Start: 2017-08-18 | End: 2017-08-18 | Stop reason: HOSPADM

## 2017-08-18 RX ORDER — MIDAZOLAM HYDROCHLORIDE 1 MG/ML
.25-5 INJECTION, SOLUTION INTRAMUSCULAR; INTRAVENOUS
Status: DISCONTINUED | OUTPATIENT
Start: 2017-08-18 | End: 2017-08-18 | Stop reason: HOSPADM

## 2017-08-18 RX ORDER — SODIUM CHLORIDE 9 MG/ML
75 INJECTION, SOLUTION INTRAVENOUS CONTINUOUS
Status: DISCONTINUED | OUTPATIENT
Start: 2017-08-18 | End: 2017-08-18 | Stop reason: HOSPADM

## 2017-08-18 RX ADMIN — PROPOFOL 100 MG: 10 INJECTION, EMULSION INTRAVENOUS at 11:21

## 2017-08-18 RX ADMIN — LIDOCAINE HYDROCHLORIDE 40 MG: 20 INJECTION, SOLUTION EPIDURAL; INFILTRATION; INTRACAUDAL; PERINEURAL at 11:21

## 2017-08-18 RX ADMIN — PROPOFOL 100 MG: 10 INJECTION, EMULSION INTRAVENOUS at 11:36

## 2017-08-18 RX ADMIN — SODIUM CHLORIDE: 900 INJECTION, SOLUTION INTRAVENOUS at 10:49

## 2017-08-18 RX ADMIN — SODIUM CHLORIDE 75 ML/HR: 900 INJECTION, SOLUTION INTRAVENOUS at 10:23

## 2017-08-18 RX ADMIN — PROPOFOL 100 MG: 10 INJECTION, EMULSION INTRAVENOUS at 11:30

## 2017-08-18 NOTE — DISCHARGE INSTRUCTIONS
[place] Office: [phone]    Roseanne Scott  [id number]  1955    EGD/COLONOSCOPY DISCHARGE INSTRUCTIONS  Discomfort:  Sore throat- throat lozenges or warm salt water gargle  redness at IV site- apply warm compress to area; if redness or soreness persist- contact your physician  Gaseous discomfort- walking, belching will help relieve any discomfort  You may not operate a vehicle for 12 hours  You may not engage in an occupation involving machinery or appliances for rest of today. You may not drink alcoholic beverages for at least 12 hours  Avoid making any critical decisions for at least 24 hour  DIET  You may resume your regular diet - however -  remember your colon is empty and a heavy meal will produce gas. Avoid these foods:  fried / greasy foods, excessive carbonated drinks or too much caffeine  MEDICATIONS   Regarding Aspirin or Nonsteroidal medications specifically, please see below. ACTIVITY  You may resume your normal daily activities. Spend the remainder of the day resting -  avoid any strenuous activity. CALL M.D. ANY SIGN OF   Increasing pain, nausea, vomiting  Abdominal distension (swelling)  New increased bleeding (oral or rectal)  Fever (chills)  Pain in chest area  Bloody discharge from nose or mouth  Shortness of breath    Follow-up Instructions:   Call  Jose Luis Mckeon MD for any questions or concerns   Telephone # [phone]      Follow-up Information     None        BiBCOMharGlooko Activation    Thank you for requesting access to Personalis. Please follow the instructions below to securely access and download your online medical record. Personalis allows you to send messages to your doctor, view your test results, renew your prescriptions, schedule appointments, and more. How Do I Sign Up? 1. In your internet browser, go to [email or web address]  2. Click on the First Time User? Click Here link in the Sign In box. You will be redirect to the New Member Sign Up page.   3. Enter your Shareable Social Access Code exactly as it appears below. You will not need to use this code after youve completed the sign-up process. If you do not sign up before the expiration date, you must request a new code. Shareable Social Access Code: [email or web address]  Expires: 10/24/2017 12:17 PM (This is the date your Shareable Social access code will )    4. Enter the last four digits of your Social Security Number (xxxx) and Date of Birth (mm/dd/yyyy) as indicated and click Submit. You will be taken to the next sign-up page. 5. Create a Veeboxt ID. This will be your Shareable Social login ID and cannot be changed, so think of one that is secure and easy to remember. 6. Create a Shareable Social password. You can change your password at any time. 7. Enter your Password Reset Question and Answer. This can be used at a later time if you forget your password. 8. Enter your e-mail address. You will receive e-mail notification when new information is available in [address] 9. Click Sign Up. You can now view and download portions of your medical record. 10. Click the Download Summary menu link to download a portable copy of your medical information. Additional Information    If you have questions, please visit the Frequently Asked Questions section of the Shareable Social website at [email or web address] Remember, Shareable Social is NOT to be used for urgent needs. For medical emergencies, dial 911.

## 2017-08-18 NOTE — ANESTHESIA PREPROCEDURE EVALUATION
Anesthetic History   No history of anesthetic complications            Review of Systems / Medical History  Patient summary reviewed, nursing notes reviewed and pertinent labs reviewed    Pulmonary  Within defined limits                 Neuro/Psych   Within defined limits           Cardiovascular    Hypertension: well controlled              Exercise tolerance: >4 METS  Comments: H/O A-Fib s/p ablation (2005)   GI/Hepatic/Renal               Comments: Common Bile Duct obstruction with obstructive jaundice Endo/Other  Within defined limits           Other Findings            Physical Exam    Airway  Mallampati: II  TM Distance: 4 - 6 cm  Neck ROM: normal range of motion        Cardiovascular  Regular rate and rhythm,  S1 and S2 normal,  no murmur, click, rub, or gallop             Dental  No notable dental hx       Pulmonary  Breath sounds clear to auscultation               Abdominal  GI exam deferred       Other Findings            Anesthetic Plan    ASA: 3  Anesthesia type: total IV anesthesia          Induction: Intravenous  Anesthetic plan and risks discussed with: Patient

## 2017-08-18 NOTE — PERIOP NOTES
Anesthesia reports 300mg Propofol, 40mg Lidocaine and 400mL NS given during procedure. Received report from anesthesia staff on vital signs and status of patient.

## 2017-08-18 NOTE — IP AVS SNAPSHOT
[address] [facility] 
[phone] Patient: Olga Perla MRN: [id number] SYX:8/61/4312 You are allergic to the following Allergen Reactions Pcn (Penicillins) Other (comments) Pt stated \"always been told PCN\" Recent Documentation Height Weight BMI Smoking Status 1.74 m 90.5 kg 29.9 kg/m2 Never Smoker Emergency Contacts Name Discharge Info Relation Home Work Mobile Hyun Cristina DISCHARGE CAREGIVER [3] Spouse [3] [phone] AkhilRoberto brannon DISCHARGE CAREGIVER [3] Child [2] [phone] Ridge Cristina III DISCHARGE CAREGIVER [3] Son [22] [phone] About your hospitalization You were admitted on:  August 18, 2017 You last received care in the:  [facility] ENDOSCOPY You were discharged on:  August 18, 2017 Unit phone number:  [phone] Why you were hospitalized Your primary diagnosis was:  Not on File Providers Seen During Your Hospitalizations Provider Role Specialty Primary office phone Louie Piedra MD Attending Provider Gastroenterology [phone] Your Primary Care Physician (PCP) Primary Care Physician Office Phone Office Fax NONE ** None ** ** None ** Follow-up Information None Your Appointments Thursday August 31, 2017 10:00 AM EDT PHYSICAL PRE OP with DO DHEERAJ Coelho III [facility] [address] [facility]  
[phone] Current Discharge Medication List  
  
CONTINUE these medications which have NOT CHANGED Dose & Instructions Dispensing Information Comments Morning Noon Evening Bedtime  
 cholestyramine-sucrose 4 gram powder Commonly known as:  Renato Axe Your last dose was:     
   
Your next dose is:    
   
   
 Dose:  4 g  
 Take 4 g by mouth two (2) times a day. Take 1 hour before other medications or meals Quantity:  1 Can Refills:  1  
     
   
   
   
  
 gabapentin 300 mg capsule Commonly known as:  NEURONTIN Your last dose was: Your next dose is:    
   
   
 Dose:  900 mg Take 900 mg by mouth two (2) times a day. 3 x 300mg caps (900mg) twice daily Refills:  0  
     
   
   
   
  
 ramipril 10 mg capsule Commonly known as:  ALTACE Your last dose was: Your next dose is:    
   
   
 Dose:  10 mg Take 10 mg by mouth daily. Refills:  0  
     
   
   
   
  
 VITAMIN B-12 1,000 mcg/mL injection Generic drug:  cyanocobalamin Your last dose was: Your next dose is:    
   
   
 Dose:  1000 mcg  
1,000 mcg by IntraMUSCular route once. Indications: VITAMIN B12 DEFICIENCY, Twice a month Refills:  0  
     
   
   
   
  
 VITAMIN D3 1,000 unit Cap Generic drug:  cholecalciferol Your last dose was: Your next dose is:    
   
   
 Dose:  1000 Units Take 1,000 Units by mouth daily. Refills:  0 Discharge Instructions [place] Office: [phone] [facility] 
[id number] 
1955 EGD/COLONOSCOPY DISCHARGE INSTRUCTIONS Discomfort: 
Sore throat- throat lozenges or warm salt water gargle 
redness at IV site- apply warm compress to area; if redness or soreness persist- contact your physician Gaseous discomfort- walking, belching will help relieve any discomfort You may not operate a vehicle for 12 hours You may not engage in an occupation involving machinery or appliances for rest of today. You may not drink alcoholic beverages for at least 12 hours Avoid making any critical decisions for at least 24 hour DIET You may resume your regular diet  however -  remember your colon is empty and a heavy meal will produce gas.  
 Avoid these foods:  fried / greasy foods, excessive carbonated drinks or too much caffeine MEDICATIONS Regarding Aspirin or Nonsteroidal medications specifically, please see below. ACTIVITY You may resume your normal daily activities. Spend the remainder of the day resting -  avoid any strenuous activity. CALL M.D. ANY SIGN OF Increasing pain, nausea, vomiting Abdominal distension (swelling) New increased bleeding (oral or rectal) Fever (chills) Pain in chest area Bloody discharge from nose or mouth Shortness of breath Follow-up Instructions: 
 Call  Jose Luis Slater MD for any questions or concerns Telephone # [phone] Follow-up Information None MyChart Activation Thank you for requesting access to [email or web address]. Please follow the instructions below to securely access and download your online medical record. [email or web address] allows you to send messages to your doctor, view your test results, renew your prescriptions, schedule appointments, and more. How Do I Sign Up? 1. In your internet browser, go to [email or web address] 
2. Click on the First Time User? Click Here link in the Sign In box. You will be redirect to the New Member Sign Up page. 3. Enter your [email or web address] Access Code exactly as it appears below. You will not need to use this code after youve completed the sign-up process. If you do not sign up before the expiration date, you must request a new code. [email or web address] Access Code: [email or web address] Expires: 10/24/2017 12:17 PM (This is the date your [email or web address] access code will ) 4. Enter the last four digits of your Social Security Number (xxxx) and Date of Birth (mm/dd/yyyy) as indicated and click Submit. You will be taken to the next sign-up page. 5. Create a [email or web address] ID. This will be your [email or web address] login ID and cannot be changed, so think of one that is secure and easy to remember. 6. Create a [email or web address] password. You can change your password at any time. 7. Enter your Password Reset Question and Answer.  This can be used at a later time if you forget your password. 8. Enter your e-mail address. You will receive e-mail notification when new information is available in [address] 9. Click Sign Up. You can now view and download portions of your medical record. 10. Click the Download Summary menu link to download a portable copy of your medical information. Additional Information If you have questions, please visit the Frequently Asked Questions section of the Sighter website at [email or web address] Remember, Sighter is NOT to be used for urgent needs. For medical emergencies, dial 911. Discharge Orders None Introducing [facility]! [facility] introduces Sighter patient portal. Now you can access parts of your medical record, email your doctor's office, and request medication refills online. 1. In your internet browser, go to [email or web address] 2. Click on the First Time User? Click Here link in the Sign In box. You will see the New Member Sign Up page. 3. Enter your Sighter Access Code exactly as it appears below. You will not need to use this code after youve completed the sign-up process. If you do not sign up before the expiration date, you must request a new code. · Sighter Access Code: [email or web address] Expires: 10/24/2017 12:17 PM 
 
4. Enter the last four digits of your Social Security Number (xxxx) and Date of Birth (mm/dd/yyyy) as indicated and click Submit. You will be taken to the next sign-up page. 5. Create a Sighter ID. This will be your Sighter login ID and cannot be changed, so think of one that is secure and easy to remember. 6. Create a Sighter password. You can change your password at any time. 7. Enter your Password Reset Question and Answer. This can be used at a later time if you forget your password. 8. Enter your e-mail address. You will receive e-mail notification when new information is available in [address] 9. Click Sign Up. You can now view and download portions of your medical record. 10. Click the Download Summary menu link to download a portable copy of your medical information. If you have questions, please visit the Frequently Asked Questions section of the [email or web address] website. Remember, [email or web address].co is NOT to be used for urgent needs. For medical emergencies, dial 911. Now available from your iPhone and Android! General Information Please provide this summary of care documentation to your next provider. Patient Signature:  ____________________________________________________________ Date:  ____________________________________________________________  
  
Vicente Cart Provider Signature:  ____________________________________________________________ Date:  ____________________________________________________________

## 2017-08-18 NOTE — ROUTINE PROCESS
Karina Wilfred  1955  [id number]    Situation:  Verbal report received from: Iris Mobley RN  Procedure: Procedure(s):  UPPER LINEAR EUS WITH PATH    Background:    Preoperative diagnosis: Obstruction of bile duct [K83.1]  Postoperative diagnosis: Gastritis, dilated bile duct and dilated  pancreatic duct    :  Dr. Carmella Conte  Assistant(s): Endoscopy RN-1: Benja Yoo RN; Kirill Wilkinson RN    Specimens: * No specimens in log *  H. Pylori  no    Assessment:  Intra-procedure medications       Anesthesia gave intra-procedure sedation and medications, see anesthesia flow sheet yes    Intravenous fluids: NS@ KVO     Vital signs stable       Abdominal assessment: round and soft       Recommendation:  Discharge patient per MD order  .     Family or Friend  Sons Shauna Alcantara and Irasema Youssef  Permission to share finding with family or friend yes

## 2017-08-18 NOTE — H&P
Pre-endoscopy H and P    The patient was seen and examined in the room/pre-op holding area. The airway was assessed and documented. The problem list, past medical history, and medications were reviewed. Patient Active Problem List   Diagnosis Code    Obstructive jaundice K83.8    Common bile duct (CBD) obstruction K83.1    UTI (urinary tract infection) N39.0     Social History     Social History    Marital status:      Spouse name: N/A    Number of children: N/A    Years of education: N/A     Occupational History    Not on file. Social History Main Topics    Smoking status: Never Smoker    Smokeless tobacco: Never Used    Alcohol use No    Drug use: Yes     Special: Prescription, OTC    Sexual activity: Not on file     Other Topics Concern    Not on file     Social History Narrative     Past Medical History:   Diagnosis Date    Autoimmune disease ([facility] [address]     Hypertension     Ill-defined condition     Previous a.fib, ablation, NSR now         Prior to Admission Medications   Prescriptions Last Dose Informant Patient Reported? Taking? cholecalciferol (VITAMIN D3) 1,000 unit cap 2017 at Unknown time  Yes Yes   Sig: Take 1,000 Units by mouth daily. cholestyramine-sucrose (QUESTRAN) 4 gram powder 2017 at Unknown time  No Yes   Sig: Take 4 g by mouth two (2) times a day. Take 1 hour before other medications or meals   cyanocobalamin (VITAMIN B-12) 1,000 mcg/mL injection 2017 at Unknown time  Yes Yes   Si,000 mcg by IntraMUSCular route once. Indications: VITAMIN B12 DEFICIENCY, Twice a month   gabapentin (NEURONTIN) 300 mg capsule 2017 at Unknown time Self Yes Yes   Sig: Take 900 mg by mouth two (2) times a day. 3 x 300mg caps (900mg) twice daily   ramipril (ALTACE) 10 mg capsule 2017 at Unknown time  Yes Yes   Sig: Take 10 mg by mouth daily.       Facility-Administered Medications: None           The review of systems is:  Negative  for shortness of breath or chest pain      The heart, lungs, and mental status were satisfactory for the administration of deep sedation and for the procedure. I discussed with the patient the objectives, risks, consequences and alternatives to the procedure.       Fallon Gibbons MD  8/18/2017  11:11 AM

## 2017-08-18 NOTE — ANESTHESIA POSTPROCEDURE EVALUATION
Post-Anesthesia Evaluation and Assessment    Patient: Anjali Christianson MRN: [id number]  SSN: [id number]    YOB: 1955  Age: 62 y.o. Sex: male       Cardiovascular Function/Vital Signs  Visit Vitals    /71    Pulse 83    Temp 36.6 °C (97.8 °F)    Resp 16    Ht 5' 8.5\" (1.74 m)    Wt 90.5 kg (199 lb 9 oz)    SpO2 97%    BMI 29.9 kg/m2       Patient is status post total IV anesthesia, MAC anesthesia for Procedure(s):  UPPER LINEAR EUS WITH PATH. Nausea/Vomiting: None    Postoperative hydration reviewed and adequate. Pain:  Pain Scale 1: Visual (08/18/17 1146)  Pain Intensity 1: 0 (08/18/17 1146)   Managed    Neurological Status: At baseline    Mental Status and Level of Consciousness: Arousable    Pulmonary Status:   O2 Device: Room air (08/18/17 1146)   Adequate oxygenation and airway patent    Complications related to anesthesia: None    Post-anesthesia assessment completed.  No concerns    Signed By: Zi Hyman MD     August 18, 2017

## 2017-08-18 NOTE — PROCEDURES
NAME:  Roseanne Scott   :   1955   MRN:   [id number]     [facility]    Date/Time:  2017   Procedure Type: EUS, diagnostic    Indications: Bile duct stricture, jaundice    Pre-operative Diagnosis: see indication above    Post-operative Diagnosis:  See findings below    : Kwan Caraballo MD    Referring Provider: -Fran Crabtree   Procedure Details:    Exam:  Airway: clear, no airway problems anticipated  Heart: RRR, without gallops or rubs  Lungs: clear bilaterally without wheezes, crackles, or rhonchi  Abdomen: soft, nontender, nondistended, bowel sounds present  Mental Status: awake, alert and oriented to person, place and time     Anethesia/Sedation:  MAC anesthesia Propofol      Procedure Details   After infom consent was obtained for the procedure, with all risks and benefits of procedure explained the patient was taken to the endoscopy suite and placed in the left lateral decubitus position. Following sequential administration of sedation as per above, the radial and then linear echoendoscope was inserted into the mouth and advanced under direct vision to second portion of the duodenum. A careful inspection was made as the gastroscope was withdrawn, including a retroflexed view of the proximal stomach; findings and interventions are described below. Findings:     Endoscopic:-E: Small HH, G: gasritis with a small gastric body nodule. D: stent coming out of ampulla    Ultrasound:   Esophagus: normal findings   Stomach: normal findings   Pancreas:     Areas examined: the head, the genu, the body, the tail    Parenchyma: -I did not see a mass in it. Tthe pancreas is isoechoic. Pancreatic Duct: some dilation noted. Liver:     Parenchyma: normal    Gallbladder: few hyperechoic foci c/w stones are noted    Bile Duct: A stent is noted in the CBD. Dilation is less prominent( now measures 1.18 cm). There is possible sludge/debris in it. I did not see a mass.                Lymph Node: no adenopathy is noted. Complications: None. EBL:  None. Interventions: None. Recommendations:     I will review CT with radiology. Spyglass cholangioscopy is recommended before stent change/removal.     EDIN Collins MD

## 2017-08-18 NOTE — IP AVS SNAPSHOT
Summary of Care Report The Summary of Care report has been created to help improve care coordination. Users with access to [address] or [address] (Web-based application) may access additional patient information including the Discharge Summary. If you are not currently a [address] user and need more information, please call the number listed below in the [place] [address] section and ask to be connected with Medical Records. Facility Information Name Address Phone [address] [address] [phone] Patient Information Patient Name Sex  Davis Henson ([id number]) Male 1955 Discharge Information Admitting Provider Service Area Unit Manuel Rivera MD / [phone] [address] / [phone] Discharge Provider Discharge Date/Time Discharge Disposition Destination (none) (none) (none) (none) Patient Language Language ENGLISH [13] Hospital Problems as of 2017  Reviewed: 2017  9:57 AM by Jarrett Castillo MD  
 None Non-Hospital Problems as of 2017  Reviewed: 2017  9:57 AM by Jarrett Castillo MD  
  
  
  
 Class Noted - Resolved Last Modified Active Problems Obstructive jaundice  2017 - Present 2017 by Jaleesa Elias MD  
  Entered by Jaleesa Elias MD  
  Common bile duct (CBD) obstruction  2017 - Present 2017 by Tomasita Lennox, MD  
  Entered by Tomasita Lennox, MD  
  UTI (urinary tract infection)  2017 - Present 2017 by Tomasita Lennox, MD  
  Entered by Tomasita Lennox, MD  
  
You are allergic to the following Allergen Reactions Pcn (Penicillins) Other (comments) Pt stated \"always been told PCN\" Current Discharge Medication List  
  
CONTINUE these medications which have NOT CHANGED Dose & Instructions Dispensing Information Comments  
 cholestyramine-sucrose 4 gram powder Commonly known as:  Lyubov Brew Dose:  4 g Take 4 g by mouth two (2) times a day. Take 1 hour before other medications or meals Quantity:  1 Can Refills:  1  
   
 gabapentin 300 mg capsule Commonly known as:  NEURONTIN Dose:  900 mg Take 900 mg by mouth two (2) times a day. 3 x 300mg caps (900mg) twice daily Refills:  0  
   
 ramipril 10 mg capsule Commonly known as:  ALTACE Dose:  10 mg Take 10 mg by mouth daily. Refills:  0  
   
 VITAMIN B-12 1,000 mcg/mL injection Generic drug:  cyanocobalamin Dose:  1000 mcg  
1,000 mcg by IntraMUSCular route once. Indications: VITAMIN B12 DEFICIENCY, Twice a month Refills:  0  
   
 VITAMIN D3 1,000 unit Cap Generic drug:  cholecalciferol Dose:  1000 Units Take 1,000 Units by mouth daily. Refills:  0 Surgery Information ID Date/Time Status Primary Surgeon All Procedures Location [id number] 8/18/2017 1030 Unposted [facility] [address], MD UPPER LINEAR EUS WITH PATH [facility] ENDOSCOPY Follow-up Information None Discharge Instructions [place] Office: [phone] Lalo Flannery 
[id number] 
1955 EGD/COLONOSCOPY DISCHARGE INSTRUCTIONS Discomfort: 
Sore throat- throat lozenges or warm salt water gargle 
redness at IV site- apply warm compress to area; if redness or soreness persist- contact your physician Gaseous discomfort- walking, belching will help relieve any discomfort You may not operate a vehicle for 12 hours You may not engage in an occupation involving machinery or appliances for rest of today. You may not drink alcoholic beverages for at least 12 hours Avoid making any critical decisions for at least 24 hour DIET You may resume your regular diet  however -  remember your colon is empty and a heavy meal will produce gas. Avoid these foods:  fried / greasy foods, excessive carbonated drinks or too much caffeine MEDICATIONS Regarding Aspirin or Nonsteroidal medications specifically, please see below. ACTIVITY You may resume your normal daily activities. Spend the remainder of the day resting -  avoid any strenuous activity. CALL M.D. ANY SIGN OF Increasing pain, nausea, vomiting Abdominal distension (swelling) New increased bleeding (oral or rectal) Fever (chills) Pain in chest area Bloody discharge from nose or mouth Shortness of breath Follow-up Instructions: 
 Call  Jose Luis Looney MD for any questions or concerns Telephone # [phone] Follow-up Information None MyChart Activation Thank you for requesting access to [employer]. Please follow the instructions below to securely access and download your online medical record. [employer] allows you to send messages to your doctor, view your test results, renew your prescriptions, schedule appointments, and more. How Do I Sign Up? 1. In your internet browser, go to [email or web address] 
2. Click on the First Time User? Click Here link in the Sign In box. You will be redirect to the New Member Sign Up page. 3. Enter your [employer] Access Code exactly as it appears below. You will not need to use this code after youve completed the sign-up process. If you do not sign up before the expiration date, you must request a new code. [employer] Access Code: [email or web address] Expires: 10/24/2017 12:17 PM (This is the date your [employer] access code will ) 4. Enter the last four digits of your Social Security Number (xxxx) and Date of Birth (mm/dd/yyyy) as indicated and click Submit. You will be taken to the next sign-up page. 5. Create a [employer] [facility] ID. This will be your [employer] [facility] login ID and cannot be changed, so think of one that is secure and easy to remember. 6. Create a [employer] password. You can change your password at any time. 7. Enter your Password Reset Question and Answer. This can be used at a later time if you forget your password. 8. Enter your e-mail address. You will receive e-mail notification when new information is available in [address] 9. Click Sign Up. You can now view and download portions of your medical record. 10. Click the Download Summary menu link to download a portable copy of your medical information. Additional Information If you have questions, please visit the Frequently Asked Questions section of the [employer] website at [email or web address] Remember, [employer] is NOT to be used for urgent needs. For medical emergencies, dial 911. Chart Review Routing History No Routing History on File

## 2017-08-24 ENCOUNTER — HOSPITAL ENCOUNTER (INPATIENT)
Age: 62
LOS: 6 days | Discharge: HOME | DRG: 871 | End: 2017-08-31
Attending: SPECIALIST | Admitting: HOSPITALIST
Payer: COMMERCIAL

## 2017-08-24 ENCOUNTER — ANESTHESIA (OUTPATIENT)
Dept: ENDOSCOPY | Age: 62
DRG: 871 | End: 2017-08-24
Payer: COMMERCIAL

## 2017-08-24 ENCOUNTER — ANESTHESIA EVENT (OUTPATIENT)
Dept: ENDOSCOPY | Age: 62
DRG: 871 | End: 2017-08-24
Payer: COMMERCIAL

## 2017-08-24 ENCOUNTER — APPOINTMENT (OUTPATIENT)
Dept: CT IMAGING | Age: 62
DRG: 871 | End: 2017-08-24
Attending: FAMILY MEDICINE
Payer: COMMERCIAL

## 2017-08-24 ENCOUNTER — APPOINTMENT (OUTPATIENT)
Dept: GENERAL RADIOLOGY | Age: 62
DRG: 871 | End: 2017-08-24
Attending: FAMILY MEDICINE
Payer: COMMERCIAL

## 2017-08-24 PROCEDURE — 71010 XR CHEST PORT: CPT

## 2017-08-24 PROCEDURE — 74011000250 HC RX REV CODE- 250

## 2017-08-24 PROCEDURE — 0FPB8DZ REMOVAL OF INTRALUMINAL DEVICE FROM HEPATOBILIARY DUCT, VIA NATURAL OR ARTIFICIAL OPENING ENDOSCOPIC: ICD-10-PCS | Performed by: SPECIALIST

## 2017-08-24 PROCEDURE — 99218 HC RM OBSERVATION: CPT

## 2017-08-24 PROCEDURE — 0FB98ZX EXCISION OF COMMON BILE DUCT, VIA NATURAL OR ARTIFICIAL OPENING ENDOSCOPIC, DIAGNOSTIC: ICD-10-PCS | Performed by: SPECIALIST

## 2017-08-24 PROCEDURE — 77030009426 HC FCPS BIOP ENDOSC BSC -B: Performed by: SPECIALIST

## 2017-08-24 PROCEDURE — 77030021561 HC FCPS BIOP SPYBITE BSC -F: Performed by: SPECIALIST

## 2017-08-24 PROCEDURE — 74011250636 HC RX REV CODE- 250/636: Performed by: ANESTHESIOLOGY

## 2017-08-24 PROCEDURE — 77030026438 HC STYL ET INTUB CARD -A: Performed by: NURSE ANESTHETIST, CERTIFIED REGISTERED

## 2017-08-24 PROCEDURE — 76040000005: Performed by: SPECIALIST

## 2017-08-24 PROCEDURE — 77030007288 HC DEV LOK BILI BSC -A: Performed by: SPECIALIST

## 2017-08-24 PROCEDURE — C1769 GUIDE WIRE: HCPCS | Performed by: SPECIALIST

## 2017-08-24 PROCEDURE — 74176 CT ABD & PELVIS W/O CONTRAST: CPT

## 2017-08-24 PROCEDURE — C1874 STENT, COATED/COV W/DEL SYS: HCPCS | Performed by: SPECIALIST

## 2017-08-24 PROCEDURE — 0DJ08ZZ INSPECTION OF UPPER INTESTINAL TRACT, VIA NATURAL OR ARTIFICIAL OPENING ENDOSCOPIC: ICD-10-PCS | Performed by: SPECIALIST

## 2017-08-24 PROCEDURE — 88305 TISSUE EXAM BY PATHOLOGIST: CPT | Performed by: SPECIALIST

## 2017-08-24 PROCEDURE — 74011636320 HC RX REV CODE- 636/320

## 2017-08-24 PROCEDURE — 74011250636 HC RX REV CODE- 250/636

## 2017-08-24 PROCEDURE — 02HV33Z INSERTION OF INFUSION DEVICE INTO SUPERIOR VENA CAVA, PERCUTANEOUS APPROACH: ICD-10-PCS | Performed by: SPECIALIST

## 2017-08-24 PROCEDURE — 76060000037 HC ANESTHESIA 3 TO 3.5 HR: Performed by: SPECIALIST

## 2017-08-24 PROCEDURE — 77030036655 HC CATH ENDOSC ACC DEL SPYSCP BSC -H1: Performed by: SPECIALIST

## 2017-08-24 PROCEDURE — 77030009038 HC CATH BILI STN RTVR BSC -C: Performed by: SPECIALIST

## 2017-08-24 PROCEDURE — 0F798DZ DILATION OF COMMON BILE DUCT WITH INTRALUMINAL DEVICE, VIA NATURAL OR ARTIFICIAL OPENING ENDOSCOPIC: ICD-10-PCS | Performed by: SPECIALIST

## 2017-08-24 PROCEDURE — 77030012596 HC SPHNTOM BILI BSC -E: Performed by: SPECIALIST

## 2017-08-24 PROCEDURE — 74011250636 HC RX REV CODE- 250/636: Performed by: SPECIALIST

## 2017-08-24 PROCEDURE — 77030008684 HC TU ET CUF COVD -B: Performed by: NURSE ANESTHETIST, CERTIFIED REGISTERED

## 2017-08-24 DEVICE — STENT SYSTEM RMV
Type: IMPLANTABLE DEVICE | Site: BILE DUCT | Status: FUNCTIONAL
Brand: WALLFLEX BILIARY

## 2017-08-24 RX ORDER — GABAPENTIN 300 MG/1
900 CAPSULE ORAL 2 TIMES DAILY
Status: DISCONTINUED | OUTPATIENT
Start: 2017-08-24 | End: 2017-08-31 | Stop reason: HOSPADM

## 2017-08-24 RX ORDER — LISINOPRIL 20 MG/1
40 TABLET ORAL DAILY
Status: DISCONTINUED | OUTPATIENT
Start: 2017-08-25 | End: 2017-08-29

## 2017-08-24 RX ORDER — SODIUM CHLORIDE 9 MG/ML
50 INJECTION, SOLUTION INTRAVENOUS CONTINUOUS
Status: DISPENSED | OUTPATIENT
Start: 2017-08-24 | End: 2017-08-24

## 2017-08-24 RX ORDER — ACETAMINOPHEN 10 MG/ML
650 INJECTION, SOLUTION INTRAVENOUS
Status: DISCONTINUED | OUTPATIENT
Start: 2017-08-24 | End: 2017-08-25 | Stop reason: ALTCHOICE

## 2017-08-24 RX ORDER — METRONIDAZOLE 500 MG/100ML
500 INJECTION, SOLUTION INTRAVENOUS EVERY 8 HOURS
Status: DISCONTINUED | OUTPATIENT
Start: 2017-08-24 | End: 2017-08-31 | Stop reason: HOSPADM

## 2017-08-24 RX ORDER — ONDANSETRON 2 MG/ML
INJECTION INTRAMUSCULAR; INTRAVENOUS AS NEEDED
Status: DISCONTINUED | OUTPATIENT
Start: 2017-08-24 | End: 2017-08-24 | Stop reason: HOSPADM

## 2017-08-24 RX ORDER — HYDROMORPHONE HYDROCHLORIDE 1 MG/ML
1 INJECTION, SOLUTION INTRAMUSCULAR; INTRAVENOUS; SUBCUTANEOUS
Status: DISCONTINUED | OUTPATIENT
Start: 2017-08-24 | End: 2017-08-31 | Stop reason: HOSPADM

## 2017-08-24 RX ORDER — PROPOFOL 10 MG/ML
INJECTION, EMULSION INTRAVENOUS AS NEEDED
Status: DISCONTINUED | OUTPATIENT
Start: 2017-08-24 | End: 2017-08-24 | Stop reason: HOSPADM

## 2017-08-24 RX ORDER — MIDAZOLAM HYDROCHLORIDE 1 MG/ML
1 INJECTION, SOLUTION INTRAMUSCULAR; INTRAVENOUS AS NEEDED
Status: DISCONTINUED | OUTPATIENT
Start: 2017-08-24 | End: 2017-08-25 | Stop reason: ALTCHOICE

## 2017-08-24 RX ORDER — FENTANYL CITRATE 50 UG/ML
50 INJECTION, SOLUTION INTRAMUSCULAR; INTRAVENOUS AS NEEDED
Status: DISCONTINUED | OUTPATIENT
Start: 2017-08-24 | End: 2017-08-25 | Stop reason: ALTCHOICE

## 2017-08-24 RX ORDER — HYDROMORPHONE HYDROCHLORIDE 1 MG/ML
0.2 INJECTION, SOLUTION INTRAMUSCULAR; INTRAVENOUS; SUBCUTANEOUS
Status: DISCONTINUED | OUTPATIENT
Start: 2017-08-24 | End: 2017-08-24

## 2017-08-24 RX ORDER — FENTANYL CITRATE 50 UG/ML
25 INJECTION, SOLUTION INTRAMUSCULAR; INTRAVENOUS
Status: DISCONTINUED | OUTPATIENT
Start: 2017-08-24 | End: 2017-08-25 | Stop reason: ALTCHOICE

## 2017-08-24 RX ORDER — ONDANSETRON 2 MG/ML
4 INJECTION INTRAMUSCULAR; INTRAVENOUS AS NEEDED
Status: DISCONTINUED | OUTPATIENT
Start: 2017-08-24 | End: 2017-08-25 | Stop reason: ALTCHOICE

## 2017-08-24 RX ORDER — SODIUM CHLORIDE 0.9 % (FLUSH) 0.9 %
5-10 SYRINGE (ML) INJECTION AS NEEDED
Status: DISCONTINUED | OUTPATIENT
Start: 2017-08-24 | End: 2017-08-31 | Stop reason: HOSPADM

## 2017-08-24 RX ORDER — FENTANYL CITRATE 50 UG/ML
200 INJECTION, SOLUTION INTRAMUSCULAR; INTRAVENOUS
Status: DISCONTINUED | OUTPATIENT
Start: 2017-08-24 | End: 2017-08-24 | Stop reason: HOSPADM

## 2017-08-24 RX ORDER — ATROPINE SULFATE 0.1 MG/ML
0.5 INJECTION INTRAVENOUS
Status: DISCONTINUED | OUTPATIENT
Start: 2017-08-24 | End: 2017-08-24 | Stop reason: HOSPADM

## 2017-08-24 RX ORDER — SODIUM CHLORIDE 9 MG/ML
50 INJECTION, SOLUTION INTRAVENOUS CONTINUOUS
Status: DISCONTINUED | OUTPATIENT
Start: 2017-08-25 | End: 2017-08-25 | Stop reason: ALTCHOICE

## 2017-08-24 RX ORDER — DEXTROMETHORPHAN/PSEUDOEPHED 2.5-7.5/.8
1.2 DROPS ORAL
Status: DISCONTINUED | OUTPATIENT
Start: 2017-08-24 | End: 2017-08-24 | Stop reason: HOSPADM

## 2017-08-24 RX ORDER — MIDAZOLAM HYDROCHLORIDE 1 MG/ML
0.5 INJECTION, SOLUTION INTRAMUSCULAR; INTRAVENOUS
Status: DISCONTINUED | OUTPATIENT
Start: 2017-08-24 | End: 2017-08-25 | Stop reason: ALTCHOICE

## 2017-08-24 RX ORDER — SODIUM CHLORIDE 0.9 % (FLUSH) 0.9 %
5-10 SYRINGE (ML) INJECTION AS NEEDED
Status: DISCONTINUED | OUTPATIENT
Start: 2017-08-24 | End: 2017-08-25 | Stop reason: ALTCHOICE

## 2017-08-24 RX ORDER — LEVOFLOXACIN 5 MG/ML
750 INJECTION, SOLUTION INTRAVENOUS ONCE
Status: DISCONTINUED | OUTPATIENT
Start: 2017-08-24 | End: 2017-08-24

## 2017-08-24 RX ORDER — SODIUM CHLORIDE 0.9 % (FLUSH) 0.9 %
5-10 SYRINGE (ML) INJECTION AS NEEDED
Status: DISPENSED | OUTPATIENT
Start: 2017-08-24 | End: 2017-08-24

## 2017-08-24 RX ORDER — SODIUM CHLORIDE, SODIUM LACTATE, POTASSIUM CHLORIDE, CALCIUM CHLORIDE 600; 310; 30; 20 MG/100ML; MG/100ML; MG/100ML; MG/100ML
75 INJECTION, SOLUTION INTRAVENOUS CONTINUOUS
Status: DISCONTINUED | OUTPATIENT
Start: 2017-08-24 | End: 2017-08-28

## 2017-08-24 RX ORDER — SODIUM CHLORIDE 9 MG/ML
INJECTION, SOLUTION INTRAVENOUS
Status: DISCONTINUED | OUTPATIENT
Start: 2017-08-24 | End: 2017-08-24 | Stop reason: HOSPADM

## 2017-08-24 RX ORDER — LIDOCAINE HYDROCHLORIDE 10 MG/ML
0.1 INJECTION, SOLUTION EPIDURAL; INFILTRATION; INTRACAUDAL; PERINEURAL AS NEEDED
Status: DISCONTINUED | OUTPATIENT
Start: 2017-08-24 | End: 2017-08-25 | Stop reason: ALTCHOICE

## 2017-08-24 RX ORDER — LEVOFLOXACIN 5 MG/ML
500 INJECTION, SOLUTION INTRAVENOUS ONCE
Status: DISCONTINUED | OUTPATIENT
Start: 2017-08-25 | End: 2017-08-24

## 2017-08-24 RX ORDER — LIDOCAINE HYDROCHLORIDE 20 MG/ML
INJECTION, SOLUTION EPIDURAL; INFILTRATION; INTRACAUDAL; PERINEURAL AS NEEDED
Status: DISCONTINUED | OUTPATIENT
Start: 2017-08-24 | End: 2017-08-24 | Stop reason: HOSPADM

## 2017-08-24 RX ORDER — KETOROLAC TROMETHAMINE 30 MG/ML
INJECTION, SOLUTION INTRAMUSCULAR; INTRAVENOUS AS NEEDED
Status: DISCONTINUED | OUTPATIENT
Start: 2017-08-24 | End: 2017-08-24 | Stop reason: HOSPADM

## 2017-08-24 RX ORDER — RAMIPRIL 2.5 MG/1
10 CAPSULE ORAL DAILY
Status: DISCONTINUED | OUTPATIENT
Start: 2017-08-25 | End: 2017-08-24 | Stop reason: CLARIF

## 2017-08-24 RX ORDER — SODIUM CHLORIDE, SODIUM LACTATE, POTASSIUM CHLORIDE, CALCIUM CHLORIDE 600; 310; 30; 20 MG/100ML; MG/100ML; MG/100ML; MG/100ML
INJECTION, SOLUTION INTRAVENOUS
Status: DISCONTINUED | OUTPATIENT
Start: 2017-08-24 | End: 2017-08-24 | Stop reason: HOSPADM

## 2017-08-24 RX ORDER — SODIUM CHLORIDE 0.9 % (FLUSH) 0.9 %
5-10 SYRINGE (ML) INJECTION EVERY 8 HOURS
Status: DISCONTINUED | OUTPATIENT
Start: 2017-08-24 | End: 2017-08-24

## 2017-08-24 RX ORDER — SODIUM CHLORIDE 0.9 % (FLUSH) 0.9 %
5-10 SYRINGE (ML) INJECTION EVERY 8 HOURS
Status: DISCONTINUED | OUTPATIENT
Start: 2017-08-24 | End: 2017-08-31 | Stop reason: HOSPADM

## 2017-08-24 RX ORDER — OXYCODONE AND ACETAMINOPHEN 5; 325 MG/1; MG/1
1 TABLET ORAL AS NEEDED
Status: DISCONTINUED | OUTPATIENT
Start: 2017-08-24 | End: 2017-08-24

## 2017-08-24 RX ORDER — FLUMAZENIL 0.1 MG/ML
0.2 INJECTION INTRAVENOUS
Status: DISCONTINUED | OUTPATIENT
Start: 2017-08-24 | End: 2017-08-24 | Stop reason: HOSPADM

## 2017-08-24 RX ORDER — MORPHINE SULFATE 10 MG/ML
2 INJECTION, SOLUTION INTRAMUSCULAR; INTRAVENOUS
Status: DISCONTINUED | OUTPATIENT
Start: 2017-08-24 | End: 2017-08-25 | Stop reason: ALTCHOICE

## 2017-08-24 RX ORDER — EPINEPHRINE 0.1 MG/ML
1 INJECTION INTRACARDIAC; INTRAVENOUS
Status: DISCONTINUED | OUTPATIENT
Start: 2017-08-24 | End: 2017-08-24 | Stop reason: HOSPADM

## 2017-08-24 RX ORDER — MIDAZOLAM HYDROCHLORIDE 1 MG/ML
.25-1 INJECTION, SOLUTION INTRAMUSCULAR; INTRAVENOUS
Status: DISCONTINUED | OUTPATIENT
Start: 2017-08-24 | End: 2017-08-24 | Stop reason: HOSPADM

## 2017-08-24 RX ORDER — DEXAMETHASONE SODIUM PHOSPHATE 4 MG/ML
4 INJECTION, SOLUTION INTRA-ARTICULAR; INTRALESIONAL; INTRAMUSCULAR; INTRAVENOUS; SOFT TISSUE
Status: DISCONTINUED | OUTPATIENT
Start: 2017-08-24 | End: 2017-08-31 | Stop reason: HOSPADM

## 2017-08-24 RX ORDER — ROCURONIUM BROMIDE 10 MG/ML
INJECTION, SOLUTION INTRAVENOUS AS NEEDED
Status: DISCONTINUED | OUTPATIENT
Start: 2017-08-24 | End: 2017-08-24 | Stop reason: HOSPADM

## 2017-08-24 RX ORDER — SUCCINYLCHOLINE CHLORIDE 20 MG/ML
INJECTION INTRAMUSCULAR; INTRAVENOUS AS NEEDED
Status: DISCONTINUED | OUTPATIENT
Start: 2017-08-24 | End: 2017-08-24 | Stop reason: HOSPADM

## 2017-08-24 RX ORDER — SODIUM CHLORIDE 0.9 % (FLUSH) 0.9 %
5-10 SYRINGE (ML) INJECTION EVERY 8 HOURS
Status: DISCONTINUED | OUTPATIENT
Start: 2017-08-25 | End: 2017-08-25 | Stop reason: ALTCHOICE

## 2017-08-24 RX ORDER — NALOXONE HYDROCHLORIDE 0.4 MG/ML
0.4 INJECTION, SOLUTION INTRAMUSCULAR; INTRAVENOUS; SUBCUTANEOUS
Status: DISCONTINUED | OUTPATIENT
Start: 2017-08-24 | End: 2017-08-24 | Stop reason: HOSPADM

## 2017-08-24 RX ORDER — SODIUM CHLORIDE, SODIUM LACTATE, POTASSIUM CHLORIDE, CALCIUM CHLORIDE 600; 310; 30; 20 MG/100ML; MG/100ML; MG/100ML; MG/100ML
125 INJECTION, SOLUTION INTRAVENOUS CONTINUOUS
Status: DISCONTINUED | OUTPATIENT
Start: 2017-08-25 | End: 2017-08-25 | Stop reason: ALTCHOICE

## 2017-08-24 RX ORDER — SODIUM CHLORIDE, SODIUM LACTATE, POTASSIUM CHLORIDE, CALCIUM CHLORIDE 600; 310; 30; 20 MG/100ML; MG/100ML; MG/100ML; MG/100ML
125 INJECTION, SOLUTION INTRAVENOUS CONTINUOUS
Status: DISCONTINUED | OUTPATIENT
Start: 2017-08-24 | End: 2017-08-25 | Stop reason: ALTCHOICE

## 2017-08-24 RX ORDER — DIPHENHYDRAMINE HYDROCHLORIDE 50 MG/ML
12.5 INJECTION, SOLUTION INTRAMUSCULAR; INTRAVENOUS
Status: DISCONTINUED | OUTPATIENT
Start: 2017-08-24 | End: 2017-08-31 | Stop reason: HOSPADM

## 2017-08-24 RX ORDER — HYDROMORPHONE HYDROCHLORIDE 1 MG/ML
INJECTION, SOLUTION INTRAMUSCULAR; INTRAVENOUS; SUBCUTANEOUS
Status: DISCONTINUED
Start: 2017-08-24 | End: 2017-08-24

## 2017-08-24 RX ORDER — DIPHENHYDRAMINE HYDROCHLORIDE 50 MG/ML
12.5 INJECTION, SOLUTION INTRAMUSCULAR; INTRAVENOUS AS NEEDED
Status: DISCONTINUED | OUTPATIENT
Start: 2017-08-24 | End: 2017-08-24

## 2017-08-24 RX ORDER — SODIUM CHLORIDE 9 MG/ML
1000 INJECTION, SOLUTION INTRAVENOUS CONTINUOUS
Status: DISCONTINUED | OUTPATIENT
Start: 2017-08-24 | End: 2017-08-25 | Stop reason: ALTCHOICE

## 2017-08-24 RX ORDER — ACETAMINOPHEN 10 MG/ML
1000 INJECTION, SOLUTION INTRAVENOUS ONCE
Status: DISCONTINUED | OUTPATIENT
Start: 2017-08-24 | End: 2017-08-24

## 2017-08-24 RX ORDER — LEVOFLOXACIN 5 MG/ML
750 INJECTION, SOLUTION INTRAVENOUS EVERY 24 HOURS
Status: DISCONTINUED | OUTPATIENT
Start: 2017-08-25 | End: 2017-08-25 | Stop reason: DRUGHIGH

## 2017-08-24 RX ORDER — KETOROLAC TROMETHAMINE 30 MG/ML
INJECTION, SOLUTION INTRAMUSCULAR; INTRAVENOUS
Status: DISCONTINUED
Start: 2017-08-24 | End: 2017-08-24

## 2017-08-24 RX ORDER — FENTANYL CITRATE 50 UG/ML
INJECTION, SOLUTION INTRAMUSCULAR; INTRAVENOUS AS NEEDED
Status: DISCONTINUED | OUTPATIENT
Start: 2017-08-24 | End: 2017-08-24 | Stop reason: HOSPADM

## 2017-08-24 RX ADMIN — ONDANSETRON 4 MG: 2 INJECTION INTRAMUSCULAR; INTRAVENOUS at 22:08

## 2017-08-24 RX ADMIN — FENTANYL CITRATE 50 MCG: 50 INJECTION, SOLUTION INTRAMUSCULAR; INTRAVENOUS at 18:42

## 2017-08-24 RX ADMIN — LEVOFLOXACIN 750 MG: 5 INJECTION, SOLUTION INTRAVENOUS at 22:03

## 2017-08-24 RX ADMIN — ONDANSETRON 4 MG: 2 INJECTION INTRAMUSCULAR; INTRAVENOUS at 19:30

## 2017-08-24 RX ADMIN — PROPOFOL 150 MG: 10 INJECTION, EMULSION INTRAVENOUS at 17:03

## 2017-08-24 RX ADMIN — SODIUM CHLORIDE: 9 INJECTION, SOLUTION INTRAVENOUS at 19:59

## 2017-08-24 RX ADMIN — SODIUM CHLORIDE, SODIUM LACTATE, POTASSIUM CHLORIDE, AND CALCIUM CHLORIDE 125 ML/HR: 600; 310; 30; 20 INJECTION, SOLUTION INTRAVENOUS at 20:40

## 2017-08-24 RX ADMIN — SODIUM CHLORIDE, SODIUM LACTATE, POTASSIUM CHLORIDE, CALCIUM CHLORIDE: 600; 310; 30; 20 INJECTION, SOLUTION INTRAVENOUS at 16:54

## 2017-08-24 RX ADMIN — SUCCINYLCHOLINE CHLORIDE 140 MG: 20 INJECTION INTRAMUSCULAR; INTRAVENOUS at 17:03

## 2017-08-24 RX ADMIN — IOPAMIDOL 20 ML: 612 INJECTION, SOLUTION INTRAVENOUS at 17:00

## 2017-08-24 RX ADMIN — PROPOFOL 100 MG: 10 INJECTION, EMULSION INTRAVENOUS at 19:06

## 2017-08-24 RX ADMIN — LIDOCAINE HYDROCHLORIDE 80 MG: 20 INJECTION, SOLUTION EPIDURAL; INFILTRATION; INTRACAUDAL; PERINEURAL at 17:03

## 2017-08-24 RX ADMIN — KETOROLAC TROMETHAMINE 30 MG: 30 INJECTION, SOLUTION INTRAMUSCULAR; INTRAVENOUS at 20:18

## 2017-08-24 RX ADMIN — MEPERIDINE HYDROCHLORIDE: 50 INJECTION INTRAMUSCULAR; INTRAVENOUS; SUBCUTANEOUS at 17:59

## 2017-08-24 RX ADMIN — FENTANYL CITRATE 50 MCG: 50 INJECTION, SOLUTION INTRAMUSCULAR; INTRAVENOUS at 18:49

## 2017-08-24 RX ADMIN — ROCURONIUM BROMIDE 5 MG: 10 INJECTION, SOLUTION INTRAVENOUS at 17:03

## 2017-08-24 RX ADMIN — ONDANSETRON 4 MG: 2 INJECTION INTRAMUSCULAR; INTRAVENOUS at 19:02

## 2017-08-24 NOTE — H&P
Pre-endoscopy H and P     The patient was seen and examined in the endoscopy suite. The airway was assessed and docuemented. The problem list and medications were reviewed. Patient Active Problem List   Diagnosis Code    Obstructive jaundice K83.8    Common bile duct (CBD) obstruction K83.1    UTI (urinary tract infection) N39.0     Social History     Social History    Marital status:      Spouse name: N/A    Number of children: N/A    Years of education: N/A     Occupational History    Not on file. Social History Main Topics    Smoking status: Never Smoker    Smokeless tobacco: Never Used    Alcohol use No    Drug use: Yes     Special: Prescription, OTC    Sexual activity: Not on file     Other Topics Concern    Not on file     Social History Narrative     Past Medical History:   Diagnosis Date    Autoimmune disease ([address]     Hypertension     Ill-defined condition     Previous a.fib, ablation, NSR now         Prior to Admission Medications   Prescriptions Last Dose Informant Patient Reported? Taking? cholecalciferol (VITAMIN D3) 1,000 unit cap 2017 at Unknown time  Yes Yes   Sig: Take 1,000 Units by mouth daily. cholestyramine-sucrose (QUESTRAN) 4 gram powder Not Taking at Unknown time  No No   Sig: Take 4 g by mouth two (2) times a day. Take 1 hour before other medications or meals   cyanocobalamin (VITAMIN B-12) 1,000 mcg/mL injection 8/15/2017  Yes No   Si,000 mcg by IntraMUSCular route once. Indications: VITAMIN B12 DEFICIENCY, Twice a month   gabapentin (NEURONTIN) 300 mg capsule 2017 at Unknown time Self Yes Yes   Sig: Take 900 mg by mouth two (2) times a day. 3 x 300mg caps (900mg) twice daily   ramipril (ALTACE) 10 mg capsule 2017 at Unknown time  Yes Yes   Sig: Take 10 mg by mouth daily.       Facility-Administered Medications: None       Chief complaint, history of present illness, and review of systems and Past medical History are positive for: HTN, painless jaundice, CBD Stricture and atrial fibrillation. The heart, lungs and mental status were satisfactory for the administration of sedation and for the procedure. I discussed with the patient the objectives, risks, consequences and alternatives to the procedure.      Plan: Endoscopic procedure with sedation     Roel Wells MD   8/24/2017  7:09 PM

## 2017-08-24 NOTE — IP AVS SNAPSHOT
Summary of Care Report The Summary of Care report has been created to help improve care coordination. Users with access to [employer] or [address] (Web-based application) may access additional patient information including the Discharge Summary. If you are not currently a [address] user and need more information, please call the number listed below in the [place] [address] section and ask to be connected with Medical Records. Facility Information Name Address Phone [address] [facility] [address] [phone] Patient Information Patient Name Sex  Zafar Juarez ([id number]) Male 1955 Discharge Information Admitting Provider Service Area Unit Cherelle Han MD / Amelia [address] [facility] / [phone] Discharge Provider Discharge Date/Time Discharge Disposition Destination (none) 2017 (Pending) AHR (none) Patient Language Language ENGLISH [13] Hospital Problems as of 2017  Reviewed: 2017  2:11 PM by Oc Prieto MD  
 None Non-Hospital Problems as of 2017  Reviewed: 2017  2:11 PM by Oc Prieto MD  
  
  
  
 Class Noted - Resolved Last Modified Active Problems Obstructive jaundice  2017 - Present 2017 by Melecio Hernandez MD  
  Entered by Melecio Hernandez MD  
  Common bile duct (CBD) obstruction  2017 - Present 2017 by Pillo Marrufo MD  
  Entered by Pillo Marrufo MD  
  UTI (urinary tract infection)  2017 - Present 2017 by Pillo Marrufo MD  
  Entered by Pillo Marrufo MD  
  
You are allergic to the following Allergen Reactions Pcn (Penicillins) Other (comments) Pt stated \"always been told PCN\" Current Discharge Medication List  
  
START taking these medications Dose & Instructions Dispensing Information Comments  
 acetaminophen 325 mg tablet Commonly known as:  TYLENOL Dose:  650 mg Take 2 Tabs by mouth every six (6) hours as needed for Fever. Quantity:  30 Tab Refills:  0  
   
 ciprofloxacin HCl 500 mg tablet Commonly known as:  CIPRO Dose:  500 mg Take 1 Tab by mouth two (2) times a day for 10 days. Quantity:  20 Tab Refills:  0  
   
 docusate sodium 100 mg capsule Commonly known as:  Lylia Sella Dose:  100 mg Take 1 Cap by mouth two (2) times a day for 30 days. Quantity:  60 Cap Refills:  0  
   
 metroNIDAZOLE 500 mg tablet Commonly known as:  FLAGYL Dose:  500 mg Take 1 Tab by mouth three (3) times daily. Quantity:  30 Tab Refills:  0 CONTINUE these medications which have NOT CHANGED Dose & Instructions Dispensing Information Comments  
 gabapentin 300 mg capsule Commonly known as:  NEURONTIN Dose:  900 mg Take 900 mg by mouth two (2) times a day. 3 x 300mg caps (900mg) twice daily Refills:  0  
   
 VITAMIN B-12 1,000 mcg/mL injection Generic drug:  cyanocobalamin Dose:  1000 mcg  
1,000 mcg by IntraMUSCular route once. Indications: VITAMIN B12 DEFICIENCY, Twice a month Refills:  0  
   
 VITAMIN D3 1,000 unit Cap Generic drug:  cholecalciferol Dose:  1000 Units Take 1,000 Units by mouth daily. Refills:  0 STOP taking these medications Comments  
 cholestyramine-sucrose 4 gram powder Commonly known as:  QUESTRAN  
   
   
 ramipril 10 mg capsule Commonly known as:  ALTACE Surgery Information ID Date/Time Status Primary Surgeon All Procedures Location [id number] 8/24/2017 [address] Márquez MD ENDOSCOPIC RETROGRADE CHOLANGIOPANCREATOGRAPHY DIRECT VISUALIZATION 
ENDOSCOPIC RETROGRADE CHOLANGIOPANCREATOGRAPHY 
ENDOSCOPY WITH PROSTHESIS OR STENT REMOVAL 
ESOPHAGOGASTRODUODENAL (EGD) BIOPSY ENDOSCOPIC STONE EXTRACTION/BALLOON SWEEP 
BILIARY STENT PLACEMENT [facility] ENDOSCOPY Follow-up Information Follow up With Details Comments Contact Info None   None (395) Patient stated that they have no PCP Discharge Instructions Discharge Instructions PATIENT ID: Nasreen Garcia MRN: [id number] YOB: 1955 DATE OF ADMISSION: 8/24/2017  2:43 PM   
DATE OF DISCHARGE: 8/31/2017 PRIMARY CARE PROVIDER: None DISCHARGING PHYSICIAN: Aniket Ospina MD   
To contact this individual call [phone] and ask the  to page. If unavailable ask to be transferred the Adult Hospitalist Department. DISCHARGE DIAGNOSES Common bile duct adenocarcinoma, cholangitis. CONSULTATIONS: IP CONSULT TO GENERAL SURGERY 
IP CONSULT TO INFECTIOUS DISEASES PROCEDURES/SURGERIES: Procedure(s): ENDOSCOPIC RETROGRADE CHOLANGIOPANCREATOGRAPHY DIRECT VISUALIZATION 
ENDOSCOPIC RETROGRADE CHOLANGIOPANCREATOGRAPHY 
ENDOSCOPY WITH PROSTHESIS OR STENT REMOVAL 
ESOPHAGOGASTRODUODENAL (EGD) BIOPSY ENDOSCOPIC STONE EXTRACTION/BALLOON SWEEP 
BILIARY STENT PLACEMENT PENDING TEST RESULTS:  
At the time of discharge the following test results are still pending: NA 
 
FOLLOW UP APPOINTMENTS:  
Follow-up Information Follow up With Details Comments Contact Info None   None (395) Patient stated that they have no PCP 
  
  
  
 
ADDITIONAL CARE RECOMMENDATIONS: NA Please follow up with your primary care provider in 1 week. Please repeat CBC and BMP tomorrow on 09/01/2017. Please follow up with Surgeon as you have been suggested. DIET: Cardiac Diet and Renal Diet ACTIVITY: Activity as tolerated DISCHARGE MEDICATIONS: 
 See Medication Reconciliation Form · It is important that you take the medication exactly as they are prescribed.   
· Keep your medication in the bottles provided by the pharmacist and keep a list of the medication names, dosages, and times to be taken in your wallet. · Do not take other medications without consulting your doctor. NOTIFY YOUR PHYSICIAN FOR ANY OF THE FOLLOWING:  
Fever over 101 degrees for 24 hours. Chest pain, shortness of breath, fever, chills, nausea, vomiting, diarrhea, change in mentation, falling, weakness, bleeding. Severe pain or pain not relieved by medications. Or, any other signs or symptoms that you may have questions about. DISPOSITION: 
 X Home With: 
 OT  PT  [place]  RN  
  
 SNF/Inpatient Rehab/LTAC Independent/assisted living Hospice Other: CDMP Checked: Yes X Signed:  
Tyrone Trent MD 
8/31/2017 2:15 PM 
 
Chart Review Routing History Recipient Method Report Sent By Lynette Beal MD  
Fax: [phone] Phone: [phone] Fax Kait Vanessa MD NOTES AUTO ROUTING REPORT Janiya Beal MD [[id number]] 8/26/2017  1:11 PM 08/26/2017

## 2017-08-24 NOTE — PROCEDURES
[address]  [address]                NAME:  Ras Camargo   :   1955   MRN:   [id number]     [address]  Date/Time:  2017 7:15 PM    Procedure Type:   ERCPwith biliary stent placement, biliary stent removal, Cholangioscopy, biopsy of bile duct stricture under fluroscopy     Indications: jaundice, Common bile duct stricture    Preoperative diagnosis: CBD Stones    Postoperative diagnosis: * No post-op diagnosis entered *    : Nima Payne MD    Referring Provider:  None    Sedation:  General anesthesia  Procedure Details:  After informed consent was obtained with all risks and benefits of procedure explained, the patient was taken to the fluoroscopy suite and placed in the prone position. Upon sequential sedation as per above, the Olympus duodenoscope [id number]   was inserted via the mouthpeice and carefully advanced to the second portion of the duodenum. The quality of visualization was good. The duodenoscope was withdrawn into a short position. Findings:   Findings:   Esophagus:normal  Stomach: normal   Duodenum/jejunum: normal    Ampulla:Protruding plastic stent removed with rat tooth foreceps  Cholangiogram: -Dilated intra and extra-hepatic bile duct with stricture in mid to distal CBD. Pancreatogram:not performed    Specimen Removed:  Biopsy of bile duct    Complications: None. EBL:  None. Interventions:    Pancreatic: none  Biliary: Cannulation was obtained using a dreamtome and guidewire was advanced to intrahepatic bile duct. Spyglass cholangioscope was advanced to intrahepatic bile duct and adequate visualization was obtained. Spyglass was slowly withdrawn and an abrupt change was seen iat the level of the stricture. Mucosa was irregular, friable and abnormal and multiple biopsies were obtained from the strictured area. Cholagioscope was then withdrawn and balloon sweep of CBD was performed.  A 10 mm X 6 cm fully covered metal stent was placed across the stricture and biliary decompression was performed by suction. Zora Dec[place] placement was confirmed by fluroscopy and endoscopy as well.     Impression:  Common bile duct stricture rule out malignancy status post biopsy and metal stent placement    Recommendations: Admit for 23 hr observation due to abdominal pain and fever          Roel Wells MD

## 2017-08-24 NOTE — IP AVS SNAPSHOT
[address] 
[phone] Patient: Bethany Aviles MRN: [id number] LOJ:9/98/1450 You are allergic to the following Allergen Reactions Pcn (Penicillins) Other (comments) Pt stated \"always been told PCN\" Recent Documentation Height Weight BMI Smoking Status 1.74 m 100.2 kg 33.08 kg/m2 Never Smoker Emergency Contacts Name Discharge Info Relation Home Work Mobile Hyun Cristina DISCHARGE CAREGIVER [3] Spouse [3] [phone] AkhilRoberto brannon DISCHARGE CAREGIVER [3] Child [2] [phone] Ridge Cristina III DISCHARGE CAREGIVER [3] Child [2] [phone] About your hospitalization You were admitted on:  August 24, 2017 You last received care in the:  [address] [place] [address] OBSERVATION You were discharged on:  August 31, 2017 Unit phone number:  [phone] Why you were hospitalized Your primary diagnosis was:  Not on File Your diagnoses also included:  Sepsis (Hcc) Providers Seen During Your Hospitalizations Provider Role Specialty Primary office phone Amelie Booker MD Attending Provider Gastroenterology [phone] Keesha Grnaados MD Attending Provider Hospitalist [phone] Cherelle Han MD Attending Provider Internal Medicine [phone] Charleen Worthington MD Attending Provider Internal Medicine [phone] Shara Wilson MD Attending Provider Family Practice [phone] Oc Prieto MD Attending Provider [facility] [phone] Your Primary Care Physician (PCP) Primary Care Physician Office Phone Office Fax NONE ** None ** ** None ** Follow-up Information Follow up With Details Comments Contact Info None   None (395) Patient stated that they have no PCP Current Discharge Medication List  
  
START taking these medications Dose & Instructions Dispensing Information Comments Morning Noon Evening Bedtime  
 acetaminophen 325 mg tablet Commonly known as:  TYLENOL Your last dose was: Your next dose is:    
   
   
 Dose:  650 mg Take 2 Tabs by mouth every six (6) hours as needed for Fever. Quantity:  30 Tab Refills:  0  
     
   
   
   
  
 ciprofloxacin HCl 500 mg tablet Commonly known as:  CIPRO Your last dose was: Your next dose is:    
   
   
 Dose:  500 mg Take 1 Tab by mouth two (2) times a day for 10 days. Quantity:  20 Tab Refills:  0  
     
   
   
   
  
 docusate sodium 100 mg capsule Commonly known as:  Genice Flax Your last dose was: Your next dose is:    
   
   
 Dose:  100 mg Take 1 Cap by mouth two (2) times a day for 30 days. Quantity:  60 Cap Refills:  0  
     
   
   
   
  
 metroNIDAZOLE 500 mg tablet Commonly known as:  FLAGYL Your last dose was: Your next dose is:    
   
   
 Dose:  500 mg Take 1 Tab by mouth three (3) times daily. Quantity:  30 Tab Refills:  0 CONTINUE these medications which have NOT CHANGED Dose & Instructions Dispensing Information Comments Morning Noon Evening Bedtime  
 gabapentin 300 mg capsule Commonly known as:  NEURONTIN Your last dose was: Your next dose is:    
   
   
 Dose:  900 mg Take 900 mg by mouth two (2) times a day. 3 x 300mg caps (900mg) twice daily Refills:  0  
     
   
   
   
  
 VITAMIN B-12 1,000 mcg/mL injection Generic drug:  cyanocobalamin Your last dose was: Your next dose is:    
   
   
 Dose:  1000 mcg  
1,000 mcg by IntraMUSCular route once. Indications: VITAMIN B12 DEFICIENCY, Twice a month Refills:  0  
     
   
   
   
  
 VITAMIN D3 1,000 unit Cap Generic drug:  cholecalciferol Your last dose was: Your next dose is:    
   
   
 Dose:  1000 Units Take 1,000 Units by mouth daily. Refills:  0 STOP taking these medications   
 cholestyramine-sucrose 4 gram powder Commonly known as:  QUESTRAN  
   
  
 ramipril 10 mg capsule Commonly known as:  ALTACE Where to Get Your Medications Information on where to get these meds will be given to you by the nurse or doctor. ! Ask your nurse or doctor about these medications  
  acetaminophen 325 mg tablet  
 ciprofloxacin HCl 500 mg tablet  
 docusate sodium 100 mg capsule  
 metroNIDAZOLE 500 mg tablet Discharge Instructions Discharge Instructions PATIENT ID: Eilsa Rice MRN: [id number] YOB: 1955 DATE OF ADMISSION: 8/24/2017  2:43 PM   
DATE OF DISCHARGE: 8/31/2017 PRIMARY CARE PROVIDER: None DISCHARGING PHYSICIAN: Ana Duran MD   
To contact this individual call [phone] and ask the  to page. If unavailable ask to be transferred the Adult Hospitalist Department. DISCHARGE DIAGNOSES Common bile duct adenocarcinoma, cholangitis. CONSULTATIONS: IP CONSULT TO GENERAL SURGERY 
IP CONSULT TO INFECTIOUS DISEASES PROCEDURES/SURGERIES: Procedure(s): ENDOSCOPIC RETROGRADE CHOLANGIOPANCREATOGRAPHY DIRECT VISUALIZATION 
ENDOSCOPIC RETROGRADE CHOLANGIOPANCREATOGRAPHY 
ENDOSCOPY WITH PROSTHESIS OR STENT REMOVAL 
ESOPHAGOGASTRODUODENAL (EGD) BIOPSY ENDOSCOPIC STONE EXTRACTION/BALLOON SWEEP 
BILIARY STENT PLACEMENT PENDING TEST RESULTS:  
At the time of discharge the following test results are still pending: NA 
 
FOLLOW UP APPOINTMENTS:  
Follow-up Information Follow up With Details Comments Contact Info None   None (395) Patient stated that they have no PCP 
  
  
  
 
ADDITIONAL CARE RECOMMENDATIONS: NA Please follow up with your primary care provider in 1 week. Please repeat CBC and BMP tomorrow on 09/01/2017. Please follow up with Surgeon as you have been suggested. DIET: Cardiac Diet and Renal Diet ACTIVITY: Activity as tolerated DISCHARGE MEDICATIONS: 
 See Medication Reconciliation Form · It is important that you take the medication exactly as they are prescribed. · Keep your medication in the bottles provided by the pharmacist and keep a list of the medication names, dosages, and times to be taken in your wallet. · Do not take other medications without consulting your doctor. NOTIFY YOUR PHYSICIAN FOR ANY OF THE FOLLOWING:  
Fever over 101 degrees for 24 hours. Chest pain, shortness of breath, fever, chills, nausea, vomiting, diarrhea, change in mentation, falling, weakness, bleeding. Severe pain or pain not relieved by medications. Or, any other signs or symptoms that you may have questions about. DISPOSITION: 
 X Home With: 
 OT  PT  [facility]  RN  
  
 SNF/Inpatient Rehab/LTAC Independent/assisted living Hospice Other: CDMP Checked: Yes X Signed:  
Aye Domingo MD 
8/31/2017 2:15 PM 
 
Discharge Orders None Introducing [facility]! [address] introduces [employer] patient portal. Now you can access parts of your medical record, email your doctor's office, and request medication refills online. 1. In your internet browser, go to [email or web address] "[email or web address] 2. Click on the First Time User? Click Here link in the Sign In box. You will see the New Member Sign Up page. 3. Enter your [employer] Access Code exactly as it appears below. You will not need to use this code after youve completed the sign-up process. If you do not sign up before the expiration date, you must request a new code. · [employer] Access Code: [email or web address] Expires: 10/24/2017 12:17 PM 
 
4. Enter the last four digits of your Social Security Number (xxxx) and Date of Birth (mm/dd/yyyy) as indicated and click Submit. You will be taken to the next sign-up page. 5. Create a [employer] ID.  This will be your [employer] login ID and cannot be changed, so think of one that is secure and easy to remember. 6. Create a [employer] password. You can change your password at any time. 7. Enter your Password Reset Question and Answer. This can be used at a later time if you forget your password. 8. Enter your e-mail address. You will receive e-mail notification when new information is available in [address] 9. Click Sign Up. You can now view and download portions of your medical record. 10. Click the Download Summary menu link to download a portable copy of your medical information. If you have questions, please visit the Frequently Asked Questions section of the [employer] website. Remember, [employer] is NOT to be used for urgent needs. For medical emergencies, dial 911. Now available from your iPhone and Android! General Information Please provide this summary of care documentation to your next provider. Patient Signature:  ____________________________________________________________ Date:  ____________________________________________________________  
  
Quin Garces Provider Signature:  ____________________________________________________________ Date:  ____________________________________________________________

## 2017-08-24 NOTE — ANESTHESIA PREPROCEDURE EVALUATION
Anesthetic History               Review of Systems / Medical History  Patient summary reviewed, nursing notes reviewed and pertinent labs reviewed    Pulmonary                   Neuro/Psych              Cardiovascular    Hypertension        Dysrhythmias : atrial fibrillation      Exercise tolerance: >4 METS     GI/Hepatic/Renal               Comments: Biliary duct obstruction Endo/Other             Other Findings            Physical Exam    Airway  Mallampati: II    Neck ROM: normal range of motion   Mouth opening: Normal     Cardiovascular    Rhythm: regular  Rate: normal         Dental  No notable dental hx       Pulmonary  Breath sounds clear to auscultation               Abdominal         Other Findings            Anesthetic Plan    ASA: 2  Anesthesia type: general          Induction: Intravenous  Anesthetic plan and risks discussed with: Patient and Spouse

## 2017-08-25 ENCOUNTER — DOCUMENTATION ONLY (OUTPATIENT)
Dept: OTHER | Age: 62
End: 2017-08-25

## 2017-08-25 ENCOUNTER — APPOINTMENT (OUTPATIENT)
Dept: GENERAL RADIOLOGY | Age: 62
DRG: 871 | End: 2017-08-25
Attending: FAMILY MEDICINE
Payer: COMMERCIAL

## 2017-08-25 PROBLEM — A41.9 SEPSIS (HCC): Status: ACTIVE | Noted: 2017-08-25

## 2017-08-25 LAB
ALBUMIN SERPL-MCNC: 2 G/DL (ref 3.5–5)
ALBUMIN SERPL-MCNC: 2.2 G/DL (ref 3.5–5)
ALBUMIN SERPL-MCNC: 2.3 G/DL (ref 3.5–5)
ALBUMIN/GLOB SERPL: 1 {RATIO} (ref 1.1–2.2)
ALBUMIN/GLOB SERPL: 1 {RATIO} (ref 1.1–2.2)
ALBUMIN/GLOB SERPL: 1.1 {RATIO} (ref 1.1–2.2)
ALP SERPL-CCNC: 149 U/L (ref 45–117)
ALP SERPL-CCNC: 158 U/L (ref 45–117)
ALP SERPL-CCNC: 179 U/L (ref 45–117)
ALT SERPL-CCNC: 215 U/L (ref 12–78)
ALT SERPL-CCNC: 239 U/L (ref 12–78)
ALT SERPL-CCNC: 243 U/L (ref 12–78)
ANION GAP SERPL CALC-SCNC: 13 MMOL/L (ref 5–15)
ANION GAP SERPL CALC-SCNC: 14 MMOL/L (ref 5–15)
ANION GAP SERPL CALC-SCNC: 14 MMOL/L (ref 5–15)
APPEARANCE UR: ABNORMAL
AST SERPL-CCNC: 115 U/L (ref 15–37)
AST SERPL-CCNC: 125 U/L (ref 15–37)
AST SERPL-CCNC: 133 U/L (ref 15–37)
ATRIAL RATE: 101 BPM
BACTERIA URNS QL MICRO: NEGATIVE /HPF
BASOPHILS # BLD: 0 K/UL (ref 0–0.1)
BASOPHILS # BLD: 0 K/UL (ref 0–0.1)
BASOPHILS NFR BLD: 0 % (ref 0–1)
BASOPHILS NFR BLD: 0 % (ref 0–1)
BILIRUB SERPL-MCNC: 7.1 MG/DL (ref 0.2–1)
BILIRUB SERPL-MCNC: 7.9 MG/DL (ref 0.2–1)
BILIRUB SERPL-MCNC: 8.6 MG/DL (ref 0.2–1)
BILIRUB UR QL CFM: POSITIVE
BUN SERPL-MCNC: 25 MG/DL (ref 6–20)
BUN SERPL-MCNC: 26 MG/DL (ref 6–20)
BUN SERPL-MCNC: 30 MG/DL (ref 6–20)
BUN/CREAT SERPL: 10 (ref 12–20)
BUN/CREAT SERPL: 10 (ref 12–20)
BUN/CREAT SERPL: 11 (ref 12–20)
CALCIUM SERPL-MCNC: 6.6 MG/DL (ref 8.5–10.1)
CALCIUM SERPL-MCNC: 6.9 MG/DL (ref 8.5–10.1)
CALCIUM SERPL-MCNC: 7 MG/DL (ref 8.5–10.1)
CALCULATED P AXIS, ECG09: 87 DEGREES
CALCULATED R AXIS, ECG10: -24 DEGREES
CALCULATED T AXIS, ECG11: -16 DEGREES
CHLORIDE SERPL-SCNC: 104 MMOL/L (ref 97–108)
CHLORIDE SERPL-SCNC: 105 MMOL/L (ref 97–108)
CHLORIDE SERPL-SCNC: 107 MMOL/L (ref 97–108)
CHOLEST SERPL-MCNC: 53 MG/DL
CO2 SERPL-SCNC: 17 MMOL/L (ref 21–32)
CO2 SERPL-SCNC: 18 MMOL/L (ref 21–32)
CO2 SERPL-SCNC: 18 MMOL/L (ref 21–32)
COLOR UR: ABNORMAL
CREAT SERPL-MCNC: 2.29 MG/DL (ref 0.7–1.3)
CREAT SERPL-MCNC: 2.51 MG/DL (ref 0.7–1.3)
CREAT SERPL-MCNC: 2.92 MG/DL (ref 0.7–1.3)
DIAGNOSIS, 93000: NORMAL
DIFFERENTIAL METHOD BLD: ABNORMAL
DIFFERENTIAL METHOD BLD: ABNORMAL
EOSINOPHIL # BLD: 0 K/UL (ref 0–0.4)
EOSINOPHIL # BLD: 0 K/UL (ref 0–0.4)
EOSINOPHIL NFR BLD: 0 % (ref 0–7)
EOSINOPHIL NFR BLD: 0 % (ref 0–7)
EPITH CASTS URNS QL MICRO: ABNORMAL /LPF
ERYTHROCYTE [DISTWIDTH] IN BLOOD BY AUTOMATED COUNT: 14.8 % (ref 11.5–14.5)
ERYTHROCYTE [DISTWIDTH] IN BLOOD BY AUTOMATED COUNT: 15.1 % (ref 11.5–14.5)
GLOBULIN SER CALC-MCNC: 1.9 G/DL (ref 2–4)
GLOBULIN SER CALC-MCNC: 2.2 G/DL (ref 2–4)
GLOBULIN SER CALC-MCNC: 2.2 G/DL (ref 2–4)
GLUCOSE SERPL-MCNC: 134 MG/DL (ref 65–100)
GLUCOSE SERPL-MCNC: 147 MG/DL (ref 65–100)
GLUCOSE SERPL-MCNC: 174 MG/DL (ref 65–100)
GLUCOSE UR STRIP.AUTO-MCNC: 100 MG/DL
HCT VFR BLD AUTO: 35.2 % (ref 36.6–50.3)
HCT VFR BLD AUTO: 38.3 % (ref 36.6–50.3)
HDLC SERPL-MCNC: 13 MG/DL
HDLC SERPL: 4.1 {RATIO} (ref 0–5)
HGB BLD-MCNC: 11.9 G/DL (ref 12.1–17)
HGB BLD-MCNC: 13 G/DL (ref 12.1–17)
HGB UR QL STRIP: NEGATIVE
KETONES UR QL STRIP.AUTO: 15 MG/DL
LACTATE SERPL-SCNC: 4.5 MMOL/L (ref 0.4–2)
LACTATE SERPL-SCNC: 5.2 MMOL/L (ref 0.4–2)
LDLC SERPL CALC-MCNC: 17.8 MG/DL (ref 0–100)
LEUKOCYTE ESTERASE UR QL STRIP.AUTO: ABNORMAL
LIPASE SERPL-CCNC: 271 U/L (ref 73–393)
LIPID PROFILE,FLP: NORMAL
LYMPHOCYTES # BLD: 0.1 K/UL (ref 0.8–3.5)
LYMPHOCYTES # BLD: 0.2 K/UL (ref 0.8–3.5)
LYMPHOCYTES NFR BLD: 1 % (ref 12–49)
LYMPHOCYTES NFR BLD: 4 % (ref 12–49)
MAGNESIUM SERPL-MCNC: 1.4 MG/DL (ref 1.6–2.4)
MCH RBC QN AUTO: 30.1 PG (ref 26–34)
MCH RBC QN AUTO: 30.5 PG (ref 26–34)
MCHC RBC AUTO-ENTMCNC: 33.8 G/DL (ref 30–36.5)
MCHC RBC AUTO-ENTMCNC: 33.9 G/DL (ref 30–36.5)
MCV RBC AUTO: 88.9 FL (ref 80–99)
MCV RBC AUTO: 89.9 FL (ref 80–99)
METAMYELOCYTES NFR BLD MANUAL: 2 %
MONOCYTES # BLD: 0.1 K/UL (ref 0–1)
MONOCYTES # BLD: 0.4 K/UL (ref 0–1)
MONOCYTES NFR BLD: 2 % (ref 5–13)
MONOCYTES NFR BLD: 2 % (ref 5–13)
MYELOCYTES NFR BLD MANUAL: 1 %
NEUTS BAND NFR BLD MANUAL: 11 % (ref 0–6)
NEUTS BAND NFR BLD MANUAL: 21 % (ref 0–6)
NEUTS SEG # BLD: 17.5 K/UL (ref 1.8–8)
NEUTS SEG # BLD: 3.4 K/UL (ref 1.8–8)
NEUTS SEG NFR BLD: 74 % (ref 32–75)
NEUTS SEG NFR BLD: 82 % (ref 32–75)
NITRITE UR QL STRIP.AUTO: POSITIVE
P-R INTERVAL, ECG05: 156 MS
PH UR STRIP: 5.5 [PH] (ref 5–8)
PHOSPHATE SERPL-MCNC: 1.6 MG/DL (ref 2.6–4.7)
PLATELET # BLD AUTO: 51 K/UL (ref 150–400)
PLATELET # BLD AUTO: 74 K/UL (ref 150–400)
PLATELET COMMENTS,PCOM: ABNORMAL
POTASSIUM SERPL-SCNC: 2.9 MMOL/L (ref 3.5–5.1)
POTASSIUM SERPL-SCNC: 3.2 MMOL/L (ref 3.5–5.1)
POTASSIUM SERPL-SCNC: 3.4 MMOL/L (ref 3.5–5.1)
PROT SERPL-MCNC: 3.9 G/DL (ref 6.4–8.2)
PROT SERPL-MCNC: 4.4 G/DL (ref 6.4–8.2)
PROT SERPL-MCNC: 4.5 G/DL (ref 6.4–8.2)
PROT UR STRIP-MCNC: 30 MG/DL
Q-T INTERVAL, ECG07: 342 MS
QRS DURATION, ECG06: 90 MS
QTC CALCULATION (BEZET), ECG08: 443 MS
RBC # BLD AUTO: 3.96 M/UL (ref 4.1–5.7)
RBC # BLD AUTO: 4.26 M/UL (ref 4.1–5.7)
RBC #/AREA URNS HPF: ABNORMAL /HPF (ref 0–5)
RBC MORPH BLD: ABNORMAL
SODIUM SERPL-SCNC: 135 MMOL/L (ref 136–145)
SODIUM SERPL-SCNC: 136 MMOL/L (ref 136–145)
SODIUM SERPL-SCNC: 139 MMOL/L (ref 136–145)
SP GR UR REFRACTOMETRY: 1.02 (ref 1–1.03)
TRIGL SERPL-MCNC: 111 MG/DL (ref ?–150)
TROPONIN I SERPL-MCNC: <0.04 NG/ML
UROBILINOGEN UR QL STRIP.AUTO: 1 EU/DL (ref 0.2–1)
VENTRICULAR RATE, ECG03: 101 BPM
VLDLC SERPL CALC-MCNC: 22.2 MG/DL
WBC # BLD AUTO: 18.4 K/UL (ref 4.1–11.1)
WBC # BLD AUTO: 3.7 K/UL (ref 4.1–11.1)
WBC MORPH BLD: ABNORMAL
WBC URNS QL MICRO: ABNORMAL /HPF (ref 0–4)

## 2017-08-25 PROCEDURE — 74011250636 HC RX REV CODE- 250/636: Performed by: FAMILY MEDICINE

## 2017-08-25 PROCEDURE — 99218 HC RM OBSERVATION: CPT

## 2017-08-25 PROCEDURE — 51798 US URINE CAPACITY MEASURE: CPT

## 2017-08-25 PROCEDURE — 93005 ELECTROCARDIOGRAM TRACING: CPT

## 2017-08-25 PROCEDURE — 83735 ASSAY OF MAGNESIUM: CPT | Performed by: FAMILY MEDICINE

## 2017-08-25 PROCEDURE — 65610000006 HC RM INTENSIVE CARE

## 2017-08-25 PROCEDURE — 84484 ASSAY OF TROPONIN QUANT: CPT | Performed by: FAMILY MEDICINE

## 2017-08-25 PROCEDURE — C1751 CATH, INF, PER/CENT/MIDLINE: HCPCS

## 2017-08-25 PROCEDURE — 74011000250 HC RX REV CODE- 250: Performed by: FAMILY MEDICINE

## 2017-08-25 PROCEDURE — 77030027138 HC INCENT SPIROMETER -A

## 2017-08-25 PROCEDURE — 81001 URINALYSIS AUTO W/SCOPE: CPT | Performed by: FAMILY MEDICINE

## 2017-08-25 PROCEDURE — 80053 COMPREHEN METABOLIC PANEL: CPT | Performed by: FAMILY MEDICINE

## 2017-08-25 PROCEDURE — 74011000250 HC RX REV CODE- 250: Performed by: HOSPITALIST

## 2017-08-25 PROCEDURE — 77010033678 HC OXYGEN DAILY

## 2017-08-25 PROCEDURE — 85025 COMPLETE CBC W/AUTO DIFF WBC: CPT | Performed by: FAMILY MEDICINE

## 2017-08-25 PROCEDURE — 36415 COLL VENOUS BLD VENIPUNCTURE: CPT | Performed by: FAMILY MEDICINE

## 2017-08-25 PROCEDURE — 36591 DRAW BLOOD OFF VENOUS DEVICE: CPT

## 2017-08-25 PROCEDURE — 74011000258 HC RX REV CODE- 258: Performed by: HOSPITALIST

## 2017-08-25 PROCEDURE — 74011250637 HC RX REV CODE- 250/637: Performed by: SPECIALIST

## 2017-08-25 PROCEDURE — 83690 ASSAY OF LIPASE: CPT | Performed by: FAMILY MEDICINE

## 2017-08-25 PROCEDURE — 77030032490 HC SLV COMPR SCD KNE COVD -B

## 2017-08-25 PROCEDURE — 83605 ASSAY OF LACTIC ACID: CPT | Performed by: FAMILY MEDICINE

## 2017-08-25 PROCEDURE — 80061 LIPID PANEL: CPT | Performed by: FAMILY MEDICINE

## 2017-08-25 PROCEDURE — 87040 BLOOD CULTURE FOR BACTERIA: CPT | Performed by: FAMILY MEDICINE

## 2017-08-25 PROCEDURE — 84100 ASSAY OF PHOSPHORUS: CPT | Performed by: FAMILY MEDICINE

## 2017-08-25 PROCEDURE — 36556 INSERT NON-TUNNEL CV CATH: CPT

## 2017-08-25 PROCEDURE — 74011250636 HC RX REV CODE- 250/636: Performed by: INTERNAL MEDICINE

## 2017-08-25 PROCEDURE — 74011000258 HC RX REV CODE- 258: Performed by: FAMILY MEDICINE

## 2017-08-25 PROCEDURE — 74011250636 HC RX REV CODE- 250/636: Performed by: HOSPITALIST

## 2017-08-25 PROCEDURE — 71010 XR CHEST PORT: CPT

## 2017-08-25 RX ORDER — SODIUM CHLORIDE 0.9 % (FLUSH) 0.9 %
10-40 SYRINGE (ML) INJECTION EVERY 8 HOURS
Status: DISCONTINUED | OUTPATIENT
Start: 2017-08-25 | End: 2017-08-31 | Stop reason: HOSPADM

## 2017-08-25 RX ORDER — BACITRACIN 500 UNIT/G
1 PACKET (EA) TOPICAL AS NEEDED
Status: DISCONTINUED | OUTPATIENT
Start: 2017-08-25 | End: 2017-08-31 | Stop reason: HOSPADM

## 2017-08-25 RX ORDER — MAGNESIUM SULFATE HEPTAHYDRATE 40 MG/ML
2 INJECTION, SOLUTION INTRAVENOUS ONCE
Status: COMPLETED | OUTPATIENT
Start: 2017-08-25 | End: 2017-08-25

## 2017-08-25 RX ORDER — VANCOMYCIN 2 GRAM/500 ML IN 0.9 % SODIUM CHLORIDE INTRAVENOUS
2000 ONCE
Status: COMPLETED | OUTPATIENT
Start: 2017-08-25 | End: 2017-08-25

## 2017-08-25 RX ORDER — POTASSIUM CHLORIDE 7.45 MG/ML
10 INJECTION INTRAVENOUS
Status: COMPLETED | OUTPATIENT
Start: 2017-08-25 | End: 2017-08-25

## 2017-08-25 RX ORDER — SODIUM CHLORIDE 0.9 % (FLUSH) 0.9 %
5-10 SYRINGE (ML) INJECTION AS NEEDED
Status: DISCONTINUED | OUTPATIENT
Start: 2017-08-25 | End: 2017-08-31 | Stop reason: HOSPADM

## 2017-08-25 RX ORDER — SODIUM CHLORIDE 9 MG/ML
25 INJECTION, SOLUTION INTRAVENOUS CONTINUOUS
Status: DISCONTINUED | OUTPATIENT
Start: 2017-08-25 | End: 2017-08-28

## 2017-08-25 RX ORDER — VANCOMYCIN HYDROCHLORIDE
1250 EVERY 24 HOURS
Status: DISCONTINUED | OUTPATIENT
Start: 2017-08-26 | End: 2017-08-26 | Stop reason: DRUGHIGH

## 2017-08-25 RX ORDER — SODIUM CHLORIDE 0.9 % (FLUSH) 0.9 %
10 SYRINGE (ML) INJECTION EVERY 24 HOURS
Status: DISCONTINUED | OUTPATIENT
Start: 2017-08-25 | End: 2017-08-31 | Stop reason: HOSPADM

## 2017-08-25 RX ORDER — LEVOFLOXACIN 5 MG/ML
750 INJECTION, SOLUTION INTRAVENOUS
Status: DISCONTINUED | OUTPATIENT
Start: 2017-08-26 | End: 2017-08-28

## 2017-08-25 RX ORDER — ACETAMINOPHEN 325 MG/1
650 TABLET ORAL
Status: DISCONTINUED | OUTPATIENT
Start: 2017-08-25 | End: 2017-08-31 | Stop reason: HOSPADM

## 2017-08-25 RX ORDER — SODIUM CHLORIDE 0.9 % (FLUSH) 0.9 %
10-30 SYRINGE (ML) INJECTION AS NEEDED
Status: DISCONTINUED | OUTPATIENT
Start: 2017-08-25 | End: 2017-08-31 | Stop reason: HOSPADM

## 2017-08-25 RX ORDER — NOREPINEPHRINE BITARTRATE/D5W 8 MG/250ML
2-30 PLASTIC BAG, INJECTION (ML) INTRAVENOUS
Status: DISCONTINUED | OUTPATIENT
Start: 2017-08-25 | End: 2017-08-28

## 2017-08-25 RX ADMIN — AZTREONAM 2 G: 2 INJECTION, POWDER, LYOPHILIZED, FOR SOLUTION INTRAMUSCULAR; INTRAVENOUS at 04:24

## 2017-08-25 RX ADMIN — Medication 10 ML: at 06:26

## 2017-08-25 RX ADMIN — SODIUM CHLORIDE 125 ML/HR: 900 INJECTION, SOLUTION INTRAVENOUS at 02:56

## 2017-08-25 RX ADMIN — CALCIUM GLUCONATE 1 G: 94 INJECTION, SOLUTION INTRAVENOUS at 03:45

## 2017-08-25 RX ADMIN — VANCOMYCIN HYDROCHLORIDE 2000 MG: 10 INJECTION, POWDER, LYOPHILIZED, FOR SOLUTION INTRAVENOUS at 01:54

## 2017-08-25 RX ADMIN — Medication 10 MCG/MIN: at 02:41

## 2017-08-25 RX ADMIN — Medication 25 MCG/MIN: at 07:28

## 2017-08-25 RX ADMIN — METRONIDAZOLE 500 MG: 500 INJECTION, SOLUTION INTRAVENOUS at 02:03

## 2017-08-25 RX ADMIN — Medication 10 ML: at 22:07

## 2017-08-25 RX ADMIN — AZTREONAM 1 G: 1 INJECTION, POWDER, LYOPHILIZED, FOR SOLUTION INTRAMUSCULAR; INTRAVENOUS at 12:19

## 2017-08-25 RX ADMIN — AZTREONAM 1 G: 1 INJECTION, POWDER, LYOPHILIZED, FOR SOLUTION INTRAMUSCULAR; INTRAVENOUS at 22:06

## 2017-08-25 RX ADMIN — METRONIDAZOLE 500 MG: 500 INJECTION, SOLUTION INTRAVENOUS at 06:26

## 2017-08-25 RX ADMIN — Medication 23 MCG/MIN: at 18:45

## 2017-08-25 RX ADMIN — SODIUM CHLORIDE 1000 ML: 900 INJECTION, SOLUTION INTRAVENOUS at 10:19

## 2017-08-25 RX ADMIN — SODIUM CHLORIDE 1000 ML: 900 INJECTION, SOLUTION INTRAVENOUS at 02:00

## 2017-08-25 RX ADMIN — HYDROMORPHONE HYDROCHLORIDE 1 MG: 1 INJECTION, SOLUTION INTRAMUSCULAR; INTRAVENOUS; SUBCUTANEOUS at 09:05

## 2017-08-25 RX ADMIN — Medication 10 ML: at 15:11

## 2017-08-25 RX ADMIN — POTASSIUM CHLORIDE 10 MEQ: 10 INJECTION, SOLUTION INTRAVENOUS at 04:24

## 2017-08-25 RX ADMIN — PHENYLEPHRINE HYDROCHLORIDE 150 MCG/MIN: 10 INJECTION INTRAVENOUS at 03:45

## 2017-08-25 RX ADMIN — GABAPENTIN 900 MG: 300 CAPSULE ORAL at 18:06

## 2017-08-25 RX ADMIN — HYDROMORPHONE HYDROCHLORIDE 1 MG: 1 INJECTION, SOLUTION INTRAMUSCULAR; INTRAVENOUS; SUBCUTANEOUS at 13:05

## 2017-08-25 RX ADMIN — Medication 24 MCG/MIN: at 13:32

## 2017-08-25 RX ADMIN — PHENYLEPHRINE HYDROCHLORIDE 300 MCG/MIN: 10 INJECTION INTRAVENOUS at 01:48

## 2017-08-25 RX ADMIN — METRONIDAZOLE 500 MG: 500 INJECTION, SOLUTION INTRAVENOUS at 22:09

## 2017-08-25 RX ADMIN — Medication 10 ML: at 22:06

## 2017-08-25 RX ADMIN — MAGNESIUM SULFATE HEPTAHYDRATE 2 G: 40 INJECTION, SOLUTION INTRAVENOUS at 08:07

## 2017-08-25 RX ADMIN — PHENYLEPHRINE HYDROCHLORIDE 100 MCG/MIN: 10 INJECTION INTRAVENOUS at 01:02

## 2017-08-25 RX ADMIN — HYDROMORPHONE HYDROCHLORIDE 1 MG: 1 INJECTION, SOLUTION INTRAMUSCULAR; INTRAVENOUS; SUBCUTANEOUS at 04:27

## 2017-08-25 RX ADMIN — SODIUM CHLORIDE 1000 ML: 900 INJECTION, SOLUTION INTRAVENOUS at 00:30

## 2017-08-25 RX ADMIN — Medication 10 MCG/MIN: at 02:44

## 2017-08-25 RX ADMIN — Medication 40 ML: at 12:20

## 2017-08-25 RX ADMIN — POTASSIUM CHLORIDE 10 MEQ: 10 INJECTION, SOLUTION INTRAVENOUS at 03:05

## 2017-08-25 RX ADMIN — HYDROMORPHONE HYDROCHLORIDE 1 MG: 1 INJECTION, SOLUTION INTRAMUSCULAR; INTRAVENOUS; SUBCUTANEOUS at 20:15

## 2017-08-25 RX ADMIN — METRONIDAZOLE 500 MG: 500 INJECTION, SOLUTION INTRAVENOUS at 15:11

## 2017-08-25 RX ADMIN — SODIUM CHLORIDE 1000 ML: 900 INJECTION, SOLUTION INTRAVENOUS at 01:00

## 2017-08-25 NOTE — PROGRESS NOTES
Hospitalist Progress Note  Claudio Galo MD  Office: [phone]        Date of Service:  2017  NAME:  Radha Thompson  :  1955  MRN:  [id number]      Admission Summary:   Patient was admitted to the intensive care unit for management of sepsis. Patient has obstructive jaundice and biliary stricture. He underwent ERCP and had his biliary stent replaced yesterday. This procedure has had fevers and hypotension. Central line is in place he has been on broad antibiotics with aztreonam Flagyl and vancomycin    Interval history / Subjective:   BP low,      Assessment & Plan:     1. Sepsis with Septic shock from cholangiolitis following - ERCP with biopsy of biliary stricture and placement of a biliary stent. On Levophed for blood pressure support. Lactic acid level is decreasing. 2. Cont abx- follow up blood cultures    3. Acute kidney injury- fluid resuscitation and keep MAP > 60 on pressors     4. Hyperglycemia cont SSI    5. Leukocytosis on abx lactate decreasing    6. Replete lytes mag / ca and repeat labs    7. Abnormal LFT's from hypotension sepsis and also underlying GB dz    8. Hypokalemia- replete per rpotocol      Code status: Full  DVT prophylaxis: SCD    Care Plan discussed with: Patient/Family and Nurse  Disposition: TBD     Hospital Problems  Date Reviewed: 2017    None            Review of Systems:   A comprehensive review of systems was negative. Vital Signs:    Last 24hrs VS reviewed since prior progress note.  Most recent are:  Visit Vitals    BP (!) 83/51    Pulse 88    Temp 98.1 °F (36.7 °C)    Resp 18    Ht 5' 8.5\" (1.74 m)    Wt 90.8 kg (200 lb 2.8 oz)    SpO2 94%    BMI 29.99 kg/m2         Intake/Output Summary (Last 24 hours) at 17 1111  Last data filed at 17 0700   Gross per 24 hour   Intake          4886.08 ml   Output               50 ml   Net          4836.08 ml        Physical Examination:             Constitutional:  No acute distress   ENT:  Oral mucous moist    Resp:  CTA bilaterally. No wheezing/rhonchi/rales. CV:  Regular rhythm, normal rate    GI:  Soft, non distended, non tender. Musculoskeletal:  No edema, warm, 2+ pulses throughout    Neurologic:  Moves all extremities. AAOx3, CN II-XII reviewed     Psych:  Good insight, Not anxious nor agitated. Data Review:    I personally reviewed  Image and labs      Labs:     Recent Labs      08/25/17 0418  08/25/17 0013   WBC  18.4*  3.7*   HGB  13.0  11.9*   HCT  38.3  35.2*   PLT  74*  51*     Recent Labs      08/25/17 0418  08/25/17 0013   NA  136  135*  139   K  3.2*  3.4*  2.9*   CL  105  104  107   CO2  17*  18*  18*   BUN  30*  26*  25*   CREA  2.92*  2.51*  2.29*   GLU  174*  147*  134*   CA  6.9*  7.0*  6.6*   MG  1.4*   --    PHOS   --   1.6*     Recent Labs      08/25/17 0418 08/25/17 0013   SGOT  125*  133*  115*   ALT  239*  243*  215*   AP  149*  179*  158*   TBILI  8.6*  7.9*  7.1*   TP  4.5*  4.4*  3.9*   ALB  2.3*  2.2*  2.0*   GLOB  2.2  2.2  1.9*   LPSE   --   271     No results for input(s): INR, PTP, APTT in the last 72 hours. No lab exists for component: INREXT   No results for input(s): FE, TIBC, PSAT, FERR in the last 72 hours. No results found for: FOL, RBCF   No results for input(s): PH, PCO2, PO2 in the last 72 hours.   Recent Labs      08/25/17   0015   TROIQ  <0.04     Lab Results   Component Value Date/Time    Cholesterol, total 53 08/25/2017 12:13 AM    HDL Cholesterol 13 08/25/2017 12:13 AM    LDL, calculated 17.8 08/25/2017 12:13 AM    Triglyceride 111 08/25/2017 12:13 AM    CHOL/HDL Ratio 4.1 08/25/2017 12:13 AM     No results found for: GLUCPOC  Lab Results   Component Value Date/Time    Color ORANGE 08/25/2017 03:18 AM    Appearance TURBID 08/25/2017 03:18 AM    Specific gravity 1.023 08/25/2017 03:18 AM    pH (UA) 5.5 08/25/2017 03:18 AM    Protein 30 08/25/2017 03:18 AM    Glucose 100 08/25/2017 03:18 AM    Ketone 15 08/25/2017 03:18 AM    Urobilinogen 1.0 08/25/2017 03:18 AM    Nitrites POSITIVE 08/25/2017 03:18 AM    Leukocyte Esterase SMALL 08/25/2017 03:18 AM    Epithelial cells FEW 08/25/2017 03:18 AM    Bacteria NEGATIVE  08/25/2017 03:18 AM    WBC 5-10 08/25/2017 03:18 AM    RBC 0-5 08/25/2017 03:18 AM         Medications Reviewed:     Current Facility-Administered Medications   Medication Dose Route Frequency    sodium chloride 0.9 % bolus infusion 500 mL  500 mL IntraVENous ONCE    sodium chloride (NS) flush 5-10 mL  5-10 mL IntraVENous PRN    0.9% sodium chloride infusion  125 mL/hr IntraVENous CONTINUOUS    VAncomycin Pharmacy Dosing   Other Rx Dosing/Monitoring    PHENYLephrine (NEOSYNEPHRINE) 30,000 mcg in 0.9% sodium chloride 250 mL infusion   mcg/min IntraVENous TITRATE    NOREPINephrine (LEVOPHED) 8,000 mcg in dextrose 5% 250 mL infusion  2-30 mcg/min IntraVENous TITRATE    [START ON 8/26/2017] vancomycin (VANCOCIN) 1250 mg in  ml infusion  1,250 mg IntraVENous Q24H    [START ON 8/26/2017] levoFLOXacin (LEVAQUIN) 750 mg in D5W IVPB  750 mg IntraVENous Q48H    [START ON 8/26/2017] Vancomycin Random level @ 0500 8/26  (with am lab)   Other ONCE    sodium chloride 0.9 % bolus infusion 1,000 mL  1,000 mL IntraVENous ONCE    sodium chloride (NS) flush 10-30 mL  10-30 mL InterCATHeter PRN    sodium chloride (NS) flush 10 mL  10 mL InterCATHeter Q24H    sodium chloride (NS) flush 10-40 mL  10-40 mL InterCATHeter Q8H    alteplase (CATHFLO) 1 mg in sterile water (preservative free) 1 mL injection  1 mg InterCATHeter PRN    bacitracin 500 unit/gram packet 1 Packet  1 Packet Topical PRN    aztreonam (AZACTAM) 1 g in 0.9% sodium chloride (MBP/ADV) 100 mL  1 g IntraVENous Q8H    gabapentin (NEURONTIN) capsule 900 mg  900 mg Oral BID    lactated Ringers infusion  125 mL/hr IntraVENous CONTINUOUS    lisinopril (Larissa Bills) tablet 40 mg  40 mg Oral DAILY    sodium chloride (NS) flush 5-10 mL  5-10 mL IntraVENous Q8H    sodium chloride (NS) flush 5-10 mL  5-10 mL IntraVENous PRN    metroNIDAZOLE (FLAGYL) IVPB premix 500 mg  500 mg IntraVENous Q8H    acetaminophen (OFIRMEV) infusion 650 mg  650 mg IntraVENous Q6H PRN    HYDROmorphone (PF) (DILAUDID) injection 1 mg  1 mg IntraVENous Q4H PRN    diphenhydrAMINE (BENADRYL) injection 12.5 mg  12.5 mg IntraVENous Q6H PRN    dexamethasone (DECADRON) 4 mg/mL injection 4 mg  4 mg IntraVENous Q6H PRN     ______________________________________________________________________  EXPECTED LENGTH OF STAY: 4d 21h  ACTUAL LENGTH OF STAY:          0                 Cherelle Han MD

## 2017-08-25 NOTE — CDMP QUERY
Dear Hospitalists,    Please clarify if this patient is being treated/managed for:    =>Hypokalemia in the setting of Bile duct stricture with Potassium 3.2 requiring IV KCL and lab monitoring  =>Other Explanation of clinical findings  =>Unable to Determine (no explanation of clinical findings)    The medical record reflects the following clinical findings, treatment, and risk factors:    Risk Factors: 62yo with bile duct stricture  Clinical Indicators: Potassium 3.2  Treatment: IV KCL and lab monitoring    Please clarify and document your clinical opinion in the progress notes and discharge summary including the definitive and/or presumptive diagnosis, (suspected or probable), related to the above clinical findings. Please include clinical findings supporting your diagnosis.     Thank You  Carlos Manuel Zelaya,MSN,BSN,RN,[facility]  [phone]

## 2017-08-25 NOTE — PROGRESS NOTES
08/24/17 2144   Vital Signs   Temp (!) 101.8 °F (38.8 °C)   Temp Source Axillary   Pulse (Heart Rate) 99   Heart Rate Source Monitor   Resp Rate 21   O2 Sat (%) 94 %   Level of Consciousness Alert   BP 91/59   MAP (Calculated) 70   BP 1 Method Automatic   BP 1 Location Left arm   BP Patient Position At rest   MEWS Score 5   Dr. Agnes Howard is notified of pts Mews score of 5, elevated temp and nausea. Dr. Agnes Howard came to unit to assess patient further and new orders written for treatment.  (see orders)

## 2017-08-25 NOTE — PROGRESS NOTES
Follow up attempt in 9448. Pt soundly sleeping. No family present in room at this time. Will attempt again this afternoon as able. CLAUDIA Millard

## 2017-08-25 NOTE — PROGRESS NOTES
Patient arrived onto unit upon admission assessment patient with low BPs 50's/30's, 98.0, 107, 97%. Patient complains 10/10 pain in up mid abdomen, nausea, and states he feels really hot. Rapid response called. Patient put in trendelenburg and started bolus of fluids. Labs as ordered were drawn including blood cultures. Peripheral IV 18g inserted to right forearm. Patient placed on 3L of 02 via NC. Received orders to transfer to ICU.

## 2017-08-25 NOTE — PROGRESS NOTES
.. TRANSFER - IN REPORT:    Verbal report received from ESVIN PEOPLES(name) on [address]  being received from PACU(unit) for routine progression of care      Report consisted of patients Situation, Background, Assessment and   Recommendations(SBAR). Information from the following report(s) SBAR, Kardex, Procedure Summary and MAR was reviewed with the receiving nurse. Opportunity for questions and clarification was provided. Assessment completed upon patients arrival to unit and care assumed. Per ESVIN Peoples, pt has an elevated temp of 102.6 edmundo. She states, Dr. Andre Poole was notified. Patient also has an order for Levaquin 750 mg need but they don't have it in stock in PACU. It will need to be administered when he arrives to unit. 2130-Patient arrived to unit. Mews score of 5 in noted and Dr. Andre Poole is notified. 2145- Dr. Kylie Sommers arrives to unit to assess pt. New orders are received. (see orders    [id number]- New orders placed for pt to be transferred to telemetry for closer observation and cardiac monitoring.

## 2017-08-25 NOTE — PROGRESS NOTES
Attended to call bell light in pt's room. Found pt sitting up in bed accompanied by spouse at bedside. Informed RN of patient's need and proceeded to visit with pt and spouse. Led in processing and provided active listening as spouse shared events leading to hospitalization along with associated feelings. Spouse shared that  had been over to visit and identified as [place]. Provided words of reassurance drawing from their lima. Offered prayer on behalf of pt which was accepted and gratitude was expressed. Commended spouse for her work as a teacher at the [employer] of nursing. Spirituality is significant to couple. Will follow as able/needed. CLAUDIA [place]

## 2017-08-25 NOTE — PROGRESS NOTES
Day #1 of Aztreonam  Indication:  Bacteremia  Current regimen:  2 grams q8h  Abx regimen: Vancomycin and Aztreonam  Recent Labs      17   0418  17   0013   WBC  18.4*  3.7*   CREA  2.92*  2.51*  2.29*   BUN  30*  26*  25*     Est CrCl: <30 ml/min using labs from 2017  Temp (24hrs), Av °F (37.8 °C), Min:98 °F (36.7 °C), Max:102 °F (38.9 °C)    Cultures: blood    Plan: Change to to 1 gram every 8hrs for crcl <30 ml/min

## 2017-08-25 NOTE — PROGRESS NOTES
Follow up for BARON PARRY for Mr. Cristina. He is found to be inpatient for sepsis with last WBC of 18. In ICU setting for now. Noted he has a new patient visit on 08/31. Call deferred until discharge. Mr. Radha Navarro did give prior permission to speak to his wife. FU after acute illness has been managed. Next FU Tues 08/29.

## 2017-08-25 NOTE — PROGRESS NOTES
Spiritual Care Assessment/Progress Notes    Cheryl Martinez [id number]  [id number]    1955  62 y.o.  male    Patient Telephone Number: [phone] (home)   Druze Affiliation: Karey Poole   Language: English   Extended Emergency Contact Information  Primary Emergency Contact: Kevin W Noah Márquez Phone: [phone]  Relation: Spouse  Secondary Emergency Contact: Roberto Cristina   [address] Phone: [phone]  Relation: Child   Patient Active Problem List    Diagnosis Date Noted    Common bile duct (CBD) obstruction 07/25/2017    UTI (urinary tract infection) 07/25/2017    Obstructive jaundice 07/23/2017        Date: 8/25/2017       Level of Druze/Spiritual Activity:  []         Involved in lima tradition/spiritual practice    []         Not involved in lima tradition/spiritual practice  []         Spiritually oriented    []         Claims no spiritual orientation    []         seeking spiritual identity  []         Feels alienated from Roman Catholic practice/tradition  []         Feels angry about Roman Catholic practice/tradition  [x]         Spirituality/Roman Catholic tradition is a resource for coping at this time.   []         Not able to assess due to medical condition    Services Provided Today:  [x]         crisis intervention    []         reading Scriptures  [x]         spiritual assessment    []         prayer  []         empathic listening/emotional support  []         rites and rituals (cite in comments)  []         life review     []         Roman Catholic support  []         theological development   []         advocacy  []         ethical dialog     []         blessing  []         bereavement support    [x]         support to family  []         anticipatory grief support   []         help with AMD  []         spiritual guidance    []         meditation      Spiritual Care Needs  [x]         Emotional Support  []         Spiritual/Druze Care  [] Loss/Adjustment  []         Advocacy/Referral                /Ethics  []         No needs expressed at               this time  []         Other: (note in               comments)  Spiritual Care Plan  [x]         Follow up visits with               pt/family  []         Provide materials  []         Schedule sacraments  []         Contact Community               Clergy  []         Follow up as needed  []         Other: (note in               comments)     Comments: Responded to RRT in 444; when arrived, pt was being prepared to be transferred to ICU and pt's wife was there; wife is an instructor at [employer] of Nursing and stated pt has been at [address] [facility] lately but came to [facility] for a procedure; provided ministry of presence to wife; Pastoral Care to follow up in the day. Rev.  Diego De La Cruz, Ph.D., M.Div., M.A.,   /Director of [address] [facility]  Paging Service: [phone])

## 2017-08-25 NOTE — CONSULTS
[facility] [address] [facility]   [facility]       Name:  Effie Joel   MR#:  [id number]   :  1955   Account #:  [de-identified]    Date of Consultation:  2017   Date of Adm:  2017       REASON FOR ADMISSION: Post-ERCP abdominal pain. CHIEF COMPLAINT: Painless jaundice. HISTORY OF PRESENT ILLNESS: The patient is a 23-year-old white   male who had presented with complaints of painless jaundice and he   was seen by Dr. Kelsey Brown who had performed an ERCP and   EUS and he was found to have a common bile duct stricture. Dr. Espinoza Flanagan   had performed biliary stenting and biliary sphincterotomy, and common   bile duct brushings as well as the endoscopic ultrasound were negative   for malignancy. He was referred to me for an ERCP and Spyglass   cholangioscopy for biopsies of the common bile duct stricture. An   ERCP was performed today and multiple biopsies were obtained from   the irregular friable stricture of the mid to distal common bile duct. Post-procedure, the patient started complaining of abdominal pain and   cough and he was also found to have a fever of 101. He is being   admitted for observation for these symptoms. PAST MEDICAL HISTORY: Significant for hypertension and atrial   fibrillation status post ablation, Sjogren's disease, urinary tract   infection, bile duct stricture. SOCIAL HISTORY: He is a nonsmoker, nondrinker. FAMILY HISTORY: Noncontributory. REVIEW OF SYSTEMS   CONSTITUTIONAL: Positive for diminished appetite and weight loss. Positive for fever. GASTROINTESTINAL: Positive for jaundice. Negative for abdominal   pain, GI bleeding. RESPIRATORY: Positive for cough. Negative for shortness of breath. CARDIOVASCULAR: No chest pain, syncope, palpitations. CENTRAL NERVOUS SYSTEM: No seizures or loss consciousness.    Review of lymphatic, hematologic, musculoskeletal, genitourinary,   endocrine and metabolic systems revealed no other abnormalities. All   other systems are negative. PHYSICAL EXAMINATION   GENERAL: He is alert, appears to be comfortable. VITAL SIGNS: Respiratory rate is 26, pulse is 105, blood pressure   129/59, temperature is 101 degrees Fahrenheit. HEAD, EYES AND ENT: Pupils are equal, react to light and   accommodation. Mucous membranes are pale. He is jaundiced. NECK: Supple. There is no carotid bruit or jugular venous distention. CHEST: Clear to auscultation. No crackles or wheeze. CARDIAC: Regular rate and rhythm. First and second sounds normal.   ABDOMEN: Soft, nontender, normoactive bowel sounds. No rigidity. No palpable masses. CENTRAL NERVOUS SYSTEM: He is alert. He is oriented to place   and person. There is no focal neurological deficit. Labs reviewed. ASSESSMENT: The patient is a 60-year-old male who presents with a   history of common bile duct stricture and underwent ERCP and   Spyglass cholangioscopy with multiple biopsies from the common bile   duct stricture obtained. He is being admitted for post-procedure   abdominal pain and fever. We will give him a dose of intravenous   Levaquin today and intravenous fluids with Ringer's lactate. He will be   treated symptomatically with Zofran for nausea and vomiting, and   Dilaudid for pain. He will be admitted to the hospitalist service for   observation for 23 hours. If he does well tomorrow, he can go home. Dr. Korina Mcdermott will reevaluate him in the morning. RECOMMENDATIONS   1. Sips of clear liquids. 2. Levaquin 750 mg IV x1 dose. 3. Continue his lactate 125 mL per hour. 4. If he does well overnight, he should be able to go home tomorrow. 5. Dr. Korina Mcdermott will reevaluate him in the morning. Thank you for consultation.         Sherry Foster MD      PK / JIMMIE   D:  08/24/2017   20:29   T:  08/24/2017   21:07   Job #:  [id number]

## 2017-08-25 NOTE — PROGRESS NOTES
[facility].  [address] [facility] 140 Octavio Pradhan, [address]  [phone]                GI PROGRESS NOTE  Sharron Hudson AGACNP-BC  Work Cell: [phone]      NAME:   Haydee Gonsalez   :    1955   MRN:    [id number]     Assessment/Plan   CBD stricture- with obstructive jaundice and septic shock s/p ERCP with Spyglass and metal stent placement. He developed severe abdominal pain, fever and hypotension post-procedure. WBC and LFTs elevated. Pain improved. Remains on pressor support. - Supportive management per ICU team  - Continue antibiotics  - Follow cultures  - Trend labs  - Await pathology results  - Surgery consult  - Monitor clinical course closely      Patient Active Problem List   Diagnosis Code    Obstructive jaundice K83.8    Common bile duct (CBD) obstruction K83.1    UTI (urinary tract infection) N39.0       Subjective:     He was initially seen at the end of July for obstructive jaundice. CT scan revealed intrahepatic and CBD dilatation with narrowing in mid CBD. ERCP demonstrated CBD stricture for which stent was placed. Brushings did not show any evidence of malignancy. CA 19-9 was not elevated. Subsequent EUS showed stent with possible sludge/debris in it. No mass was seen. ERCP with spyglass was then recommended. This was completed yesterday which demonstrated CBD stricture with irregular, friable and abnormal mucosa. Multiple biopsies were obtained and metal stent was placed. Post-procedure he had severe abdominal pain, fever and hypotension. Work-up was completed, he was started on pressors and transferred to the ICU for further monitoring. WBC elevated. LFTs remain elevated. CT scan demonstrated stent placement and pneumobilia, but was otherwise unremarkable. He is feeling michael this morning. He reports improvement in his abdominal pain. Denies any nausea or vomiting. Afebrile overnight. BP remains low.        Review of Systems    Constitutional: positive fever, negative chills, negative weight loss  Eyes:   negative visual changes  ENT:   negative sore throat, tongue or lip swelling  Respiratory:  negative cough, negative dyspnea  Cards:  negative for chest pain, palpitations, lower extremity edema  GI:   See HPI  :  negative for frequency, dysuria  Integument:  negative for rash and pruritus  Heme:  negative for easy bruising and gum/nose bleeding  Musculoskel: negative for myalgias, back pain and muscle weakness  Neuro: negative for headaches, dizziness, vertigo  Psych:  negative for feelings of anxiety, depression     Objective:     VITALS:   Last 24hrs VS reviewed since prior hospitalist progress note. Most recent are:  Visit Vitals    BP (!) 83/51    Pulse 88    Temp 98.1 °F (36.7 °C)    Resp 18    Ht 5' 8.5\" (1.74 m)    Wt 90.8 kg (200 lb 2.8 oz)    SpO2 94%    BMI 29.99 kg/m2       Intake/Output Summary (Last 24 hours) at 08/25/17 0858  Last data filed at 08/25/17 0700   Gross per 24 hour   Intake          4886.08 ml   Output               50 ml   Net          4836.08 ml        PHYSICAL EXAM:  General   well developed, alert, jaundiced, in no acute distress  EENT  Normocephalic, Atraumatic, PERRLA, EOMI, scleral icterus  Respiratory   Clear To Auscultation bilaterally - no wheezes, rales, rhonchi, or crackles  Cardiology  Regular Rate and Rythmn  - no murmurs, rubs or gallops  Abdominal  Soft, non-distended, mild RUQ tenderness, hypoactive bowel sounds, no hepatosplenomegaly, no palpable mass  Extremities  No clubbing, cyanosis, or edema. Pulses intact. Neurological  No focal neurological deficits noted  Psychological  Oriented x 3. Normal affect.        Lab Data   Recent Results (from the past 12 hour(s))   METABOLIC PANEL, COMPREHENSIVE    Collection Time: 08/25/17 12:13 AM   Result Value Ref Range    Sodium 135 (L) 136 - 145 mmol/L    Potassium 3.4 (L) 3.5 - 5.1 mmol/L    Chloride 104 97 - 108 mmol/L    CO2 18 (L) 21 - 32 mmol/L    Anion gap 13 5 - 15 mmol/L    Glucose 147 (H) 65 - 100 mg/dL    BUN 26 (H) 6 - 20 MG/DL    Creatinine 2.51 (H) 0.70 - 1.30 MG/DL    BUN/Creatinine ratio 10 (L) 12 - 20      GFR est AA 32 (L) >60 ml/min/1.73m2    GFR est non-AA 26 (L) >60 ml/min/1.73m2    Calcium 7.0 (L) 8.5 - 10.1 MG/DL    Bilirubin, total 7.9 (H) 0.2 - 1.0 MG/DL    ALT (SGPT) 243 (H) 12 - 78 U/L    AST (SGOT) 133 (H) 15 - 37 U/L    Alk. phosphatase 179 (H) 45 - 117 U/L    Protein, total 4.4 (L) 6.4 - 8.2 g/dL    Albumin 2.2 (L) 3.5 - 5.0 g/dL    Globulin 2.2 2.0 - 4.0 g/dL    A-G Ratio 1.0 (L) 1.1 - 2.2     LACTIC ACID    Collection Time: 08/25/17 12:13 AM   Result Value Ref Range    Lactic acid 5.2 (HH) 0.4 - 2.0 MMOL/L   LIPID PANEL    Collection Time: 08/25/17 12:13 AM   Result Value Ref Range    LIPID PROFILE          Cholesterol, total 53 <200 MG/DL    Triglyceride 111 <150 MG/DL    HDL Cholesterol 13 MG/DL    LDL, calculated 17.8 0 - 100 MG/DL    VLDL, calculated 22.2 MG/DL    CHOL/HDL Ratio 4.1 0 - 5.0     METABOLIC PANEL, COMPREHENSIVE    Collection Time: 08/25/17 12:13 AM   Result Value Ref Range    Sodium 139 136 - 145 mmol/L    Potassium 2.9 (L) 3.5 - 5.1 mmol/L    Chloride 107 97 - 108 mmol/L    CO2 18 (L) 21 - 32 mmol/L    Anion gap 14 5 - 15 mmol/L    Glucose 134 (H) 65 - 100 mg/dL    BUN 25 (H) 6 - 20 MG/DL    Creatinine 2.29 (H) 0.70 - 1.30 MG/DL    BUN/Creatinine ratio 11 (L) 12 - 20      GFR est AA 36 (L) >60 ml/min/1.73m2    GFR est non-AA 29 (L) >60 ml/min/1.73m2    Calcium 6.6 (L) 8.5 - 10.1 MG/DL    Bilirubin, total 7.1 (H) 0.2 - 1.0 MG/DL    ALT (SGPT) 215 (H) 12 - 78 U/L    AST (SGOT) 115 (H) 15 - 37 U/L    Alk.  phosphatase 158 (H) 45 - 117 U/L    Protein, total 3.9 (L) 6.4 - 8.2 g/dL    Albumin 2.0 (L) 3.5 - 5.0 g/dL    Globulin 1.9 (L) 2.0 - 4.0 g/dL    A-G Ratio 1.1 1.1 - 2.2     LIPASE    Collection Time: 08/25/17 12:13 AM   Result Value Ref Range    Lipase 271 73 - 393 U/L   PHOSPHORUS    Collection Time: 08/25/17 12:13 AM   Result Value Ref Range    Phosphorus 1.6 (L) 2.6 - 4.7 MG/DL   CBC WITH AUTOMATED DIFF    Collection Time: 08/25/17 12:13 AM   Result Value Ref Range    WBC 3.7 (L) 4.1 - 11.1 K/uL    RBC 3.96 (L) 4.10 - 5.70 M/uL    HGB 11.9 (L) 12.1 - 17.0 g/dL    HCT 35.2 (L) 36.6 - 50.3 %    MCV 88.9 80.0 - 99.0 FL    MCH 30.1 26.0 - 34.0 PG    MCHC 33.8 30.0 - 36.5 g/dL    RDW 14.8 (H) 11.5 - 14.5 %    PLATELET 51 (L) 265 - 400 K/uL    NEUTROPHILS 82 (H) 32 - 75 %    BAND NEUTROPHILS 11 (H) 0 - 6 %    LYMPHOCYTES 4 (L) 12 - 49 %    MONOCYTES 2 (L) 5 - 13 %    EOSINOPHILS 0 0 - 7 %    BASOPHILS 0 0 - 1 %    MYELOCYTES 1 (H) 0 %    ABS. NEUTROPHILS 3.4 1.8 - 8.0 K/UL    ABS. LYMPHOCYTES 0.1 (L) 0.8 - 3.5 K/UL    ABS. MONOCYTES 0.1 0.0 - 1.0 K/UL    ABS. EOSINOPHILS 0.0 0.0 - 0.4 K/UL    ABS.  BASOPHILS 0.0 0.0 - 0.1 K/UL    DF MANUAL      RBC COMMENTS ANISOCYTOSIS  1+       TROPONIN I    Collection Time: 08/25/17 12:15 AM   Result Value Ref Range    Troponin-I, Qt. <0.04 <0.05 ng/mL   URINALYSIS W/MICROSCOPIC    Collection Time: 08/25/17  3:18 AM   Result Value Ref Range    Color ORANGE      Appearance TURBID (A) CLEAR      Specific gravity 1.023 1.003 - 1.030      pH (UA) 5.5 5.0 - 8.0      Protein 30 (A) NEG mg/dL    Glucose 100 (A) NEG mg/dL    Ketone 15 (A) NEG mg/dL    Blood NEGATIVE  NEG      Urobilinogen 1.0 0.2 - 1.0 EU/dL    Nitrites POSITIVE (A) NEG      Leukocyte Esterase SMALL (A) NEG      WBC 5-10 0 - 4 /hpf    RBC 0-5 0 - 5 /hpf    Epithelial cells FEW FEW /lpf    Bacteria NEGATIVE  NEG /hpf   BILIRUBIN, CONFIRM    Collection Time: 08/25/17  3:18 AM   Result Value Ref Range    Bilirubin UA, confirm POSITIVE (A) NEG     LACTIC ACID    Collection Time: 08/25/17  4:18 AM   Result Value Ref Range    Lactic acid 4.5 (HH) 0.4 - 2.0 MMOL/L   METABOLIC PANEL, COMPREHENSIVE    Collection Time: 08/25/17  4:18 AM   Result Value Ref Range    Sodium 136 136 - 145 mmol/L    Potassium 3.2 (L) 3.5 - 5.1 mmol/L    Chloride 105 97 - 108 mmol/L    CO2 17 (L) 21 - 32 mmol/L    Anion gap 14 5 - 15 mmol/L    Glucose 174 (H) 65 - 100 mg/dL    BUN 30 (H) 6 - 20 MG/DL    Creatinine 2.92 (H) 0.70 - 1.30 MG/DL    BUN/Creatinine ratio 10 (L) 12 - 20      GFR est AA 27 (L) >60 ml/min/1.73m2    GFR est non-AA 22 (L) >60 ml/min/1.73m2    Calcium 6.9 (L) 8.5 - 10.1 MG/DL    Bilirubin, total 8.6 (H) 0.2 - 1.0 MG/DL    ALT (SGPT) 239 (H) 12 - 78 U/L    AST (SGOT) 125 (H) 15 - 37 U/L    Alk. phosphatase 149 (H) 45 - 117 U/L    Protein, total 4.5 (L) 6.4 - 8.2 g/dL    Albumin 2.3 (L) 3.5 - 5.0 g/dL    Globulin 2.2 2.0 - 4.0 g/dL    A-G Ratio 1.0 (L) 1.1 - 2.2     CBC WITH AUTOMATED DIFF    Collection Time: 08/25/17  4:18 AM   Result Value Ref Range    WBC 18.4 (H) 4.1 - 11.1 K/uL    RBC 4.26 4.10 - 5.70 M/uL    HGB 13.0 12.1 - 17.0 g/dL    HCT 38.3 36.6 - 50.3 %    MCV 89.9 80.0 - 99.0 FL    MCH 30.5 26.0 - 34.0 PG    MCHC 33.9 30.0 - 36.5 g/dL    RDW 15.1 (H) 11.5 - 14.5 %    PLATELET 74 (L) 600 - 400 K/uL    NEUTROPHILS 74 32 - 75 %    BAND NEUTROPHILS 21 (H) 0 - 6 %    LYMPHOCYTES 1 (L) 12 - 49 %    MONOCYTES 2 (L) 5 - 13 %    EOSINOPHILS 0 0 - 7 %    BASOPHILS 0 0 - 1 %    METAMYELOCYTES 2 (H) 0 %    ABS. NEUTROPHILS 17.5 (H) 1.8 - 8.0 K/UL    ABS. LYMPHOCYTES 0.2 (L) 0.8 - 3.5 K/UL    ABS. MONOCYTES 0.4 0.0 - 1.0 K/UL    ABS. EOSINOPHILS 0.0 0.0 - 0.4 K/UL    ABS. BASOPHILS 0.0 0.0 - 0.1 K/UL    DF MANUAL      PLATELET COMMENTS LARGE PLATELETS      RBC COMMENTS ANISOCYTOSIS  1+        RBC COMMENTS RIGO CELLS  PRESENT        WBC COMMENTS DOHLE BODIES     MAGNESIUM    Collection Time: 08/25/17  4:18 AM   Result Value Ref Range    Magnesium 1.4 (L) 1.6 - 2.4 mg/dL         Medications: Reviewed    PMH/ reviewed - no change compared to H&P  Mid-Level Provider: Madalyn Martinez NP   Date/Time:  8/25/2017        I have personally reviewed the history and independently examined the patient.  I have reviewed the chart and agree with the documentation recorded by the Mid Level Provider, including the assessment, treatment plan, and disposition. ASSESSMENT AND PLAN:  Septic shock. Concern for cholangitis, cholecystitis related to possible occlusion of cystic duct opening by the fully covered metallic stent. Improving though still very sick. If symptoms persist, would need IR assisted cholecystostomy.    Continue antibiotics and monitor clinical course   Discussed plan with Dr Marcela Stein and wife at bedside    Ivy Smith MD

## 2017-08-25 NOTE — PROGRESS NOTES
[id number] TRANSFER - IN REPORT:  Verbal report received from [facility]  on [address]  being received from [facility]  for change in patient condition(low blood pressure)    Report consisted of patients Situation, Background, Assessment and   Recommendations(SBAR). Information from the following report(s) SBAR, Kardex, ED Summary, OR Summary, Procedure Summary, Intake/Output, MAR, Accordion, Recent Results, Med Rec Status, Cardiac Rhythm sinus tach and Alarm Parameters  was reviewed with the receiving nurse. Opportunity for questions and clarification was provided. Assessment completed upon patients arrival to unit and care assumed. Primary Nurse Meka Ely, ESVIN and Chino Hodges RN performed a dual skin assessment on this patient No impairment noted  Jeet score is 22  At this time fluid boluses are going, pressure is low, Dr Cárdenas Re paged regarding this  65 Dr Cárdenas Re to see patient at this time  0102 Jose started at this time  0125 Dr Kylah Heartw anesthesia here to place central line  0135 Central line placed  0145 Xray here to confirm placement   0200 xray confirmed placement, central line in good place, levophed started at this time  0300 Patient unable to void, has tried multiple times with no success, per his wife he has not voided in 12 or more hours, patient straight cathed for only 50cc of dark osvaldo urine, urine sent for UA and culture  0400 Reassessment done, blood pressure is better, patient still in pain, was able to medicated for pain at this time. 0424 Jose stopped at this time, pressure is better  0610 Latic trending down 4.5, Dr Garfield Yost is aware   0730 Bedside and Verbal shift change report given to [address] [place] [address]  (oncoming nurse) by Elham Santillan RN  (offgoing nurse). Report included the following information SBAR, Kardex, Intake/Output, MAR, Accordion, Recent Results, Med Rec Status, Cardiac Rhythm normal sinus and Alarm Parameters .

## 2017-08-25 NOTE — PROGRESS NOTES
RAPID RESPONSE CODE (RRC)/ PROGRESS NOTE    Pt seen and evaluated for RRC with reports of hypotension. Pt and his wife c/o severe generalized abd pain. VS:  T=    BP= 59/36  HR= 104  RR= 18   O2sat= 95% on 6 liters O2 NC  PE:  GEN- ill appearing male in no acute respiratory distress; appears in pain lying on left lateral decubitus position  PSYCH- awake and oriented x 3  NEURO-GCS 15 (E4 V4  M6). MAEX 4. No slurred speech. No facial droop. HEENT-NCAT/pupils 2 mm reactive bilateral. Oropharynx clear. NECK-supple, trachea midline  LUNGS-CTA B  HEART- tachycardic, regular rhythm. No M/R/G  ABD-soft,  Generalized tenderness,ND, hypoactive BS. Voluntary guarding. No rebound or rigidity. VASCULAR-2+ radial/1+DP pulses bilateral. No C/C/E  SKIN-warm/dry  MS-no calf tenderness    CT abd/pelvis without contrast:    IMPRESSION:   1. There is a stent in the common bile duct which extends into the duodenum. There is associated pneumobilia and gas in the gallbladder lumen. There is high  attenuation material/contrast in the gallbladder and in the left hepatic biliary  ducts. 2. Diverticulosis without evidence of diverticulitis. 3. Incidental findings as above. A/P:  1)  Shock- concern for sepsis. Ordered sepsis protocol including paired blood cultures, labs, and chest xray portable. 30 ml ml IV fluid bolus stat. Establish large bore IV lines and if not obtainable, then may need central venous line insertion. If BP refractory to IV fluids, then will need vasopressor support. Transfer to ICU. 2)  Intractable generalized abdominal pain s/p ERCP. I reviewed CT abd/ pelvis which showed pneumobilia that can be seen post ERCP. 3)  Tachycardia. Order 12 lead EKG. Continue telemetry monitoring. Addendum:    I called and spoke with on call gastroenterologist Dr. Pina Barger regarding patient's clinical findings, including CT abdomen/ pelvis results, and plan of cares.   He is in agreement with the same.   Patient's clinical status is critical.  Will continue resuscitation in the ICU.

## 2017-08-25 NOTE — PROGRESS NOTES
TRANSFER - OUT REPORT:    Verbal report given to ESVIN Armstrong(name) on Penelope Mak  being transferred to [facility](unit) for routine post - op       Report consisted of patients Situation, Background, Assessment and   Recommendations(SBAR). Time Pre op antibiotic given:none  Anesthesia Stop time: 2007, endoscopy end time  Vega Present on Transfer to floor:no  Order for Vega on Chart:no    Information from the following report(s) SBAR, Kardex, Procedure Summary, Intake/Output, MAR, Med Rec Status and Cardiac Rhythm SR occ. PVC's was reviewed with the receiving nurse. Opportunity for questions and clarification was provided. Is the patient on 02? YES       L/Min 3       Other     Is the patient on a monitor? NO    Is the nurse transporting with the patient? YES    Surgical Waiting Area notified of patient's transfer from PACU?  YES      The following personal items collected during your admission accompanied patient upon transfer:   Dental Appliance: Dental Appliances: None  Vision: Visual Aid: Glasses, At bedside  Hearing Aid:    Jewelry:    Clothing:    Other Valuables:    Valuables sent to safe:

## 2017-08-25 NOTE — PROGRESS NOTES
Day #2 of Levaquin  Indication:  sepsis, septic shock post bile duct stint placement  Current regimen:  750 mg q48h  Abx regimen: Vancomycin, Aztreonam, metronidazole and Levaquin  Recent Labs      17   0013   WBC  3.7*   CREA  2.51*  2.29*   BUN  26*  25*     Est CrCl: 38 ml/minTemp (24hrs), Av.3 °F (37.9 °C), Min:98 °F (36.7 °C), Max:102 °F (38.9 °C)    Cultures: blood    Plan: Change to 750 mg q48h

## 2017-08-25 NOTE — PROGRESS NOTES
21:00 Dr. Tito Santos aware of increased Temp., give Levaquin IV as ordered  21:10 Dr. Boykin Arm informed of patient Temp 102 ax.

## 2017-08-25 NOTE — PROGRESS NOTES
Pharmacist Note - Vancomycin Dosing    Consult provided for this 62 y.o. male for indication of sepsis, septic shock post  Common bile duct stricture rule out malignancy status post biopsy and metal stent placement. Antibiotic regimen(s): Metronidazole, Levaquin, aztreonam and Vancomycin    Recent Labs      17   0013   WBC  3.7*   CREA  2.51*  2.29*   BUN  26*  25*     Frequency of BMP: daily  Height: 174 cm  Weight: 91 kg  Est CrCl: 38 ml/min  Temp (24hrs), Av.3 °F (37.9 °C), Min:98 °F (36.7 °C), Max:102 °F (38.9 °C)    Cultures:blood      Goal trough = 15 - 20 mcg/mL    Therapy will be initiated with a loading dose of 2000 mg IV x 1 to be followed by a maintenance dose of 1250 mg IV every 24 hours. Pharmacy to follow patient daily and order levels / make dose adjustments as appropriate.

## 2017-08-25 NOTE — PROCEDURES
Central Line Placement    Start time: 8/25/2017 1:23 AM  End time: 8/25/2017 1:33 AM  Performed by: JOHN Allen  Authorized by: La Nena FALL     Indications: vascular access and sepsis protocol  Preanesthetic Checklist: patient identified, risks and benefits discussed, anesthesia consent, site marked, patient being monitored and timeout performed      Pre-procedure: All elements of maximal sterile barrier technique followed?  Yes    Maximal barrier precautions followed, 2% Chlorhexidine for cutaneous antisepsis and Hand hygiene performed prior to catheter insertion            Procedure:   Prep:  ChloraPrep  Location:  Internal jugular  Orientation:  Right  Patient position:  Flat  Catheter type:  Quad lumen  Catheter size:  8.5 Fr  Catheter length:  16 cm  Number of attempts:  1  Successful placement: Yes      Assessment:   Post-procedure:  Catheter secured and sterile dressing with CHG applied  Assessment:  Free fluid flow, blood return through all ports and guidewire removal verified  Insertion:  Uncomplicated  Patient tolerance:  Patient tolerated the procedure well with no immediate complications

## 2017-08-25 NOTE — PROGRESS NOTES
Problem: Falls - Risk of  Goal: *Absence of Falls  Document Rosalia Fall Risk and appropriate interventions in the flowsheet.    Outcome: Progressing Towards Goal  Fall Risk Interventions:              Medication Interventions: Assess postural VS orthostatic hypotension, Bed/chair exit alarm, Evaluate medications/consider consulting pharmacy, Patient to call before getting OOB, Teach patient to arise slowly, Utilize gait belt for transfers/ambulation     Elimination Interventions: Bed/chair exit alarm, Call light in reach, Patient to call for help with toileting needs, Toilet paper/wipes in reach, Toileting schedule/hourly rounds, Urinal in reach

## 2017-08-25 NOTE — PROGRESS NOTES
Primary Nurse Jemma Andrade RN and Lola Hill RN performed a dual skin assessment on this patient No impairment noted  Jeet score is 21

## 2017-08-25 NOTE — PROGRESS NOTES
1930- Shift Summary - Patient alert and oriented. Slowly weaning levophed. Patient had difficulty voiding earlier in the day, bladder scanned twice, but is now voiding relatively easily. Using incentive spirometer hourly.

## 2017-08-25 NOTE — CONSULTS
General Surgery Consultation    Admit Date: 8/24/2017  Reason for Consultation: Gallbladder Distention & Sludge with CBD stricture secondary to mass    HPI:  Karina Hardin is a 62 y.o. male who we are asked to see for evaluation of gallbladder distention and sludge with CBD stricture. Patient and wife state that 3 weeks ago he was jaundiced so they presented to [facility]. He was diagnosed with obstructive jaundice, and an ERCP was performed with a plastic stent inserted into CBD to alleviate the stricture. At that time a mass was noted near the pancreas that was brushed.  was not found to be elevated. Brushing results were benign and he has been following up with GI weekly. Yesterday he presented for a follow-up ERCP and a metal stent was placed. At that time, biopsies were taken. Overnight, he began with c/o severe generalized abdominal pain, fevers and hypotension, and a Rapid Response was called. He continues to be spetic with hypotension, elevated WBC, elevated LFT's, elevated bilirubin. Today he complains of upper mid abdominal pain without NVD. Prior to this admission he has never had abdominal pain during the course of this illness. He denies prior episodes of colicky abdominal pain. Up until 3 weeks ago, he has been in general good health other than Sjögrens. He does not drink alcohol or smoke. He had an A fib 13 years ago treated with ablation. CT Abdomen & Pelvis w/o Contrast (8/24/2017)  IMPRESSION:   1. There is a stent in the common bile duct which extends into the duodenum. There is associated pneumobilia and gas in the gallbladder lumen. There is high  attenuation material/contrast in the gallbladder and in the left hepatic biliary  ducts. 2. Diverticulosis without evidence of diverticulitis. 3. Incidental findings as above.       Patient Active Problem List    Diagnosis Date Noted    Sepsis [facility]) 08/25/2017    Common bile duct (CBD) obstruction 07/25/2017    UTI (urinary tract infection) 07/25/2017    Obstructive jaundice 07/23/2017     Past Medical History:   Diagnosis Date    Autoimmune disease ([facility] [address]     Hypertension     Ill-defined condition     Previous a.fib, ablation, NSR now      Past Surgical History:   Procedure Laterality Date    CARDIAC SURG PROCEDURE UNLIST      Ablation 2005    HX ORTHOPAEDIC      Spinal fusion     NEUROLOGICAL PROCEDURE UNLISTED  2005    Nima Pollack hole washout from cerebral hemorrhage      Social History   Substance Use Topics    Smoking status: Never Smoker    Smokeless tobacco: Never Used    Alcohol use No      Family History   Problem Relation Age of Onset    Cancer Father      Breast and Colon      Prior to Admission medications    Medication Sig Start Date End Date Taking? Authorizing Provider   gabapentin (NEURONTIN) 300 mg capsule Take 900 mg by mouth two (2) times a day. 3 x 300mg caps (900mg) twice daily   Yes Historical Provider   ramipril (ALTACE) 10 mg capsule Take 10 mg by mouth daily. Yes Phys Other, MD   cholecalciferol (VITAMIN D3) 1,000 unit cap Take 1,000 Units by mouth daily. Yes Phys Other, MD   cholestyramine-sucrose (QUESTRAN) 4 gram powder Take 4 g by mouth two (2) times a day. Take 1 hour before other medications or meals 7/28/17   Pinky Ibarra MD   cyanocobalamin (VITAMIN B-12) 1,000 mcg/mL injection 1,000 mcg by IntraMUSCular route once. Indications: VITAMIN B12 DEFICIENCY, Twice a month    Phys MD Kimberlee     Allergies   Allergen Reactions    Pcn [Penicillins] Other (comments)     Pt stated \"always been told PCN\"          Subjective:     Review of Systems:    A comprehensive review of systems was negative except for that written in the History of Present Illness. Objective:     Blood pressure 106/65, pulse 86, temperature 97.2 °F (36.2 °C), resp. rate 20, height 5' 8.5\" (1.74 m), weight 200 lb 2.8 oz (90.8 kg), SpO2 96 %.   Recent Results (from the past 24 hour(s))   METABOLIC PANEL, COMPREHENSIVE    Collection Time: 08/25/17 12:13 AM   Result Value Ref Range    Sodium 135 (L) 136 - 145 mmol/L    Potassium 3.4 (L) 3.5 - 5.1 mmol/L    Chloride 104 97 - 108 mmol/L    CO2 18 (L) 21 - 32 mmol/L    Anion gap 13 5 - 15 mmol/L    Glucose 147 (H) 65 - 100 mg/dL    BUN 26 (H) 6 - 20 MG/DL    Creatinine 2.51 (H) 0.70 - 1.30 MG/DL    BUN/Creatinine ratio 10 (L) 12 - 20      GFR est AA 32 (L) >60 ml/min/1.73m2    GFR est non-AA 26 (L) >60 ml/min/1.73m2    Calcium 7.0 (L) 8.5 - 10.1 MG/DL    Bilirubin, total 7.9 (H) 0.2 - 1.0 MG/DL    ALT (SGPT) 243 (H) 12 - 78 U/L    AST (SGOT) 133 (H) 15 - 37 U/L    Alk. phosphatase 179 (H) 45 - 117 U/L    Protein, total 4.4 (L) 6.4 - 8.2 g/dL    Albumin 2.2 (L) 3.5 - 5.0 g/dL    Globulin 2.2 2.0 - 4.0 g/dL    A-G Ratio 1.0 (L) 1.1 - 2.2     LACTIC ACID    Collection Time: 08/25/17 12:13 AM   Result Value Ref Range    Lactic acid 5.2 (HH) 0.4 - 2.0 MMOL/L   LIPID PANEL    Collection Time: 08/25/17 12:13 AM   Result Value Ref Range    LIPID PROFILE          Cholesterol, total 53 <200 MG/DL    Triglyceride 111 <150 MG/DL    HDL Cholesterol 13 MG/DL    LDL, calculated 17.8 0 - 100 MG/DL    VLDL, calculated 22.2 MG/DL    CHOL/HDL Ratio 4.1 0 - 5.0     METABOLIC PANEL, COMPREHENSIVE    Collection Time: 08/25/17 12:13 AM   Result Value Ref Range    Sodium 139 136 - 145 mmol/L    Potassium 2.9 (L) 3.5 - 5.1 mmol/L    Chloride 107 97 - 108 mmol/L    CO2 18 (L) 21 - 32 mmol/L    Anion gap 14 5 - 15 mmol/L    Glucose 134 (H) 65 - 100 mg/dL    BUN 25 (H) 6 - 20 MG/DL    Creatinine 2.29 (H) 0.70 - 1.30 MG/DL    BUN/Creatinine ratio 11 (L) 12 - 20      GFR est AA 36 (L) >60 ml/min/1.73m2    GFR est non-AA 29 (L) >60 ml/min/1.73m2    Calcium 6.6 (L) 8.5 - 10.1 MG/DL    Bilirubin, total 7.1 (H) 0.2 - 1.0 MG/DL    ALT (SGPT) 215 (H) 12 - 78 U/L    AST (SGOT) 115 (H) 15 - 37 U/L    Alk.  phosphatase 158 (H) 45 - 117 U/L    Protein, total 3.9 (L) 6.4 - 8.2 g/dL    Albumin 2.0 (L) 3.5 - 5.0 g/dL    Globulin 1.9 (L) 2.0 - 4.0 g/dL    A-G Ratio 1.1 1.1 - 2.2     LIPASE    Collection Time: 08/25/17 12:13 AM   Result Value Ref Range    Lipase 271 73 - 393 U/L   PHOSPHORUS    Collection Time: 08/25/17 12:13 AM   Result Value Ref Range    Phosphorus 1.6 (L) 2.6 - 4.7 MG/DL   CBC WITH AUTOMATED DIFF    Collection Time: 08/25/17 12:13 AM   Result Value Ref Range    WBC 3.7 (L) 4.1 - 11.1 K/uL    RBC 3.96 (L) 4.10 - 5.70 M/uL    HGB 11.9 (L) 12.1 - 17.0 g/dL    HCT 35.2 (L) 36.6 - 50.3 %    MCV 88.9 80.0 - 99.0 FL    MCH 30.1 26.0 - 34.0 PG    MCHC 33.8 30.0 - 36.5 g/dL    RDW 14.8 (H) 11.5 - 14.5 %    PLATELET 51 (L) 290 - 400 K/uL    NEUTROPHILS 82 (H) 32 - 75 %    BAND NEUTROPHILS 11 (H) 0 - 6 %    LYMPHOCYTES 4 (L) 12 - 49 %    MONOCYTES 2 (L) 5 - 13 %    EOSINOPHILS 0 0 - 7 %    BASOPHILS 0 0 - 1 %    MYELOCYTES 1 (H) 0 %    ABS. NEUTROPHILS 3.4 1.8 - 8.0 K/UL    ABS. LYMPHOCYTES 0.1 (L) 0.8 - 3.5 K/UL    ABS. MONOCYTES 0.1 0.0 - 1.0 K/UL    ABS. EOSINOPHILS 0.0 0.0 - 0.4 K/UL    ABS.  BASOPHILS 0.0 0.0 - 0.1 K/UL    DF MANUAL      RBC COMMENTS ANISOCYTOSIS  1+       TROPONIN I    Collection Time: 08/25/17 12:15 AM   Result Value Ref Range    Troponin-I, Qt. <0.04 <0.05 ng/mL   EKG, 12 LEAD, INITIAL    Collection Time: 08/25/17 12:51 AM   Result Value Ref Range    Ventricular Rate 101 BPM    Atrial Rate 101 BPM    P-R Interval 156 ms    QRS Duration 90 ms    Q-T Interval 342 ms    QTC Calculation (Bezet) 443 ms    Calculated P Axis 87 degrees    Calculated R Axis -24 degrees    Calculated T Axis -16 degrees    Diagnosis       Sinus tachycardia with occasional premature ventricular complexes  Inferior infarct , age undetermined  When compared with ECG of 25-JUL-2017 15:05,  premature ventricular complexes are now present  Confirmed by Suad Gibbons MD. ([id number]) on 8/25/2017 11:44:36 AM     URINALYSIS W/MICROSCOPIC    Collection Time: 08/25/17  3:18 AM   Result Value Ref Range    Color ORANGE Appearance TURBID (A) CLEAR      Specific gravity 1.023 1.003 - 1.030      pH (UA) 5.5 5.0 - 8.0      Protein 30 (A) NEG mg/dL    Glucose 100 (A) NEG mg/dL    Ketone 15 (A) NEG mg/dL    Blood NEGATIVE  NEG      Urobilinogen 1.0 0.2 - 1.0 EU/dL    Nitrites POSITIVE (A) NEG      Leukocyte Esterase SMALL (A) NEG      WBC 5-10 0 - 4 /hpf    RBC 0-5 0 - 5 /hpf    Epithelial cells FEW FEW /lpf    Bacteria NEGATIVE  NEG /hpf   BILIRUBIN, CONFIRM    Collection Time: 08/25/17  3:18 AM   Result Value Ref Range    Bilirubin UA, confirm POSITIVE (A) NEG     LACTIC ACID    Collection Time: 08/25/17  4:18 AM   Result Value Ref Range    Lactic acid 4.5 (HH) 0.4 - 2.0 MMOL/L   METABOLIC PANEL, COMPREHENSIVE    Collection Time: 08/25/17  4:18 AM   Result Value Ref Range    Sodium 136 136 - 145 mmol/L    Potassium 3.2 (L) 3.5 - 5.1 mmol/L    Chloride 105 97 - 108 mmol/L    CO2 17 (L) 21 - 32 mmol/L    Anion gap 14 5 - 15 mmol/L    Glucose 174 (H) 65 - 100 mg/dL    BUN 30 (H) 6 - 20 MG/DL    Creatinine 2.92 (H) 0.70 - 1.30 MG/DL    BUN/Creatinine ratio 10 (L) 12 - 20      GFR est AA 27 (L) >60 ml/min/1.73m2    GFR est non-AA 22 (L) >60 ml/min/1.73m2    Calcium 6.9 (L) 8.5 - 10.1 MG/DL    Bilirubin, total 8.6 (H) 0.2 - 1.0 MG/DL    ALT (SGPT) 239 (H) 12 - 78 U/L    AST (SGOT) 125 (H) 15 - 37 U/L    Alk.  phosphatase 149 (H) 45 - 117 U/L    Protein, total 4.5 (L) 6.4 - 8.2 g/dL    Albumin 2.3 (L) 3.5 - 5.0 g/dL    Globulin 2.2 2.0 - 4.0 g/dL    A-G Ratio 1.0 (L) 1.1 - 2.2     CBC WITH AUTOMATED DIFF    Collection Time: 08/25/17  4:18 AM   Result Value Ref Range    WBC 18.4 (H) 4.1 - 11.1 K/uL    RBC 4.26 4.10 - 5.70 M/uL    HGB 13.0 12.1 - 17.0 g/dL    HCT 38.3 36.6 - 50.3 %    MCV 89.9 80.0 - 99.0 FL    MCH 30.5 26.0 - 34.0 PG    MCHC 33.9 30.0 - 36.5 g/dL    RDW 15.1 (H) 11.5 - 14.5 %    PLATELET 74 (L) 004 - 400 K/uL    NEUTROPHILS 74 32 - 75 %    BAND NEUTROPHILS 21 (H) 0 - 6 %    LYMPHOCYTES 1 (L) 12 - 49 %    MONOCYTES 2 (L) 5 - 13 %    EOSINOPHILS 0 0 - 7 %    BASOPHILS 0 0 - 1 %    METAMYELOCYTES 2 (H) 0 %    ABS. NEUTROPHILS 17.5 (H) 1.8 - 8.0 K/UL    ABS. LYMPHOCYTES 0.2 (L) 0.8 - 3.5 K/UL    ABS. MONOCYTES 0.4 0.0 - 1.0 K/UL    ABS. EOSINOPHILS 0.0 0.0 - 0.4 K/UL    ABS. BASOPHILS 0.0 0.0 - 0.1 K/UL    DF MANUAL      PLATELET COMMENTS LARGE PLATELETS      RBC COMMENTS ANISOCYTOSIS  1+        RBC COMMENTS RIGO CELLS  PRESENT        WBC COMMENTS DOHLE BODIES     MAGNESIUM    Collection Time: 08/25/17  4:18 AM   Result Value Ref Range    Magnesium 1.4 (L) 1.6 - 2.4 mg/dL     _____________________  Physical Exam:     General:  Drowsy, cooperative, no distress, appears stated age. Eyes:   Sclera slightly icteric. Throat: Lips, mucosa, and tongue normal.   Neck: Supple, symmetrical, trachea midline. Lungs:   Clear to auscultation bilaterally. Heart:  Regular rate and rhythm. Abdomen:   Distended with + TTP in RUQ & upper mid abdomen. . Bowel sounds present. No palpable masses or organomegaly. Extremities: Extremities normal, atraumatic, trace BLE edema. Assessment:   Active Problems:    Sepsis ([address] (8/25/2017)            Plan:     Continue resuscitation and medical management per Medicine Team.  Will discuss with Dr. Jarrett Francisco for further plan. Thank you for allowing us to participate in the care of this patient. Total time spent with patient: 30 minutes.     Signed By: Daria Najera PA-C     August 25, 2017

## 2017-08-25 NOTE — PROGRESS NOTES
Problem: Falls - Risk of  Goal: *Absence of Falls  Document Rosalia Fall Risk and appropriate interventions in the flowsheet.    Outcome: Progressing Towards Goal  Fall Risk Interventions:              Medication Interventions: Evaluate medications/consider consulting pharmacy, Patient to call before getting OOB, Teach patient to arise slowly      Fall precautions are in place at this time                 Problem: Sepsis: Day of Diagnosis  Goal: *Fluid resuscitation  Outcome: Progressing Towards Goal  Patient received 3 liters of fluid  Goal: *Paired blood cultures prior to first dose of antibiotic  Outcome: Resolved/Met Date Met:  08/25/17  Blood cultures drawn prior to antibiotic given  Goal: *First dose of appropriate antibiotic within 3 hours of arrival to ED, within 1 hour of arrival to ICU  Outcome: Progressing Towards Goal  Patient on unit at 0042, vancomycin given 0154  Goal: *Lactic acid with first set of blood cultures  Outcome: Progressing Towards Goal  Lactic was drawn and elevated at 5.2  Goal: *Pneumococcal immunization (if eligible)  Outcome: Not Met  n/a  Goal: *Influenza immunization (if eligible)  Outcome: Not Met  Not flu season

## 2017-08-25 NOTE — H&P
[address] [facility] [place] [address]   HISTORY AND PHYSICAL       Name:  Raymond Arambula   MR#:  [id number]   :  1955   Account #:  [de-identified]        Date of Adm:  2017       CHIEF COMPLAINT: Status post ERCP, abdominal pain, and elevated   bilirubin. HISTORY OF PRESENT ILLNESS: The patient is a 62 male who   recently presented to [facility] secondary to   jaundice, found to have an obstructive lesion with an ultrasound   showing distended common bile duct, sludge and tiny stones. The CT   showed marked intrahepatic biliary dilation and common bile duct   dilation to the level of the mid common bile duct, which is markedly   narrowed. The patient also has history of hypertension, status post   ablation for atrial fibrillation, Sjogren disease. The patient post   diagnosis of obstructive jaundice, underwent two ERCP procedures,   and today underwent a SpyGlass ERCP procedure to try to evaluate   the stricture. The patient initially had, on the first ERCP procedure, had   a stent placed. Today, the Spyglass procedure was attempted, and the   hospitalist service was requested to observe the patient in the hospital.   When I went to evaluate the patient, the patient appeared to be in   significant distress from nausea and vomiting, complaining of dry   heaving and complaining of feeling \"hot. \" The patient also spiked a   fever of 101.8 degrees Fahrenheit in the ER. The patient also spiked a   fever in the PACU and was prescribed 1 dose of Levaquin. The patient   denies any headache, blurry vision, sore throat, trouble swallowing,   trouble with speech, any chest pain, shortness of breath, urinary   symptoms, focal or generalized neurological weakness, recent travels,   hematemesis, melena, hemoptysis, falls, injuries, shortness of breath,   cough or any other concerns or problems. The patient denies any   hematemesis, melena, hemoptysis.     PAST MEDICAL HISTORY: See above. HOME MEDICATIONS: Currently the patient is on:   1. Gabapentin 900 mg b.i.d.   2.  10 mg daily. 3. Questran mg b.i.d.   4. Cyanocobalamine daily. SOCIAL HISTORY: No tobacco abuse, alcohol use, or IV drug abuse. Lives at home. ALLERGIES: PENICILLIN. FAMILY HISTORY: Was discussed. Father had history of breast and   colon cancer. REVIEW OF SYSTEMS: All systems were reviewed and found to be   essentially negative except for the symptoms mentioned above. PHYSICAL EXAMINATION   VITAL SIGNS: Temperature 101.8, pulse 99, respiratory rate 21, blood   pressure 91/59, pulse oximetry 94% on 2 liters. GENERAL: Alert x3, awake, distressed, pleasant male, appears to be   stated age. HEENT: Pupils equal and reactive to light. Dry mucous membranes. Tympanic membranes clear. NECK: Supple. CHEST: Clear to auscultation bilaterally. CORONARY: S1, S2 were heard. ABDOMEN: Soft, tender to palpation in the epigastric region. No   rebound, no guarding. Bowel sounds hypoactive. EXTREMITIES: No clubbing, no cyanosis, no edema. NEUROPSYCHIATRIC: Pleasant mood and affect. Cranial nerves 2-  12 grossly intact. Sensory grossly within normal limits. DTR 1+. SKIN: Warm. LABORATORY DATA: Are all pending. ASSESSMENT AND PLAN   1. History of obstructive jaundice, status post multiple ERCPs, with last   ERCP being attempted today. The patient will be observed on a   telemetry bed. Concern for pancreatitis. Will start the patient on IV   fluids, which have been ordered by Gastroenterology. Will keep n.p.o.   for now. Pain control and continue to closely monitor. Further   intervention will be per hospital course and will reassess as needed. Will order Benadryl for itching, and monitor for now, reassess as   needed. We will obtain lipase in the morning, obtain baseline labs with   CBC and CMP today. 2. Fever, unclear etiology. Concern for intra-abdominal infection.  Start   the patient on IV antibiotics with Levaquin and Flagyl. Will provide IV   fluid. Will get CBC, CMP, lactic acid. Will also get a CT of the abdomen   and pelvis to rule out any acute pathology. We will provide pain control   and further intervention will be per hospital course. Will also get blood   cultures, UA and chest x-ray and continue to monitor. Will reassess as   needed. Further intervention will be per hospital course. 3. History of hypertension. Continue home medications. 4. History of Sjogren's. Will order Benadryl as needed. 5. Gastrointestinal and deep venous thrombosis prophylaxis. The   patient will be on sequential compression devices.         MD Angel Meridadine Form / Ba Fuentes   D:  08/24/2017   22:37   T:  08/24/2017   23:45   Job #:  [id number]

## 2017-08-25 NOTE — PHYSICIAN ADVISORY
Discussed with Attending Physician and Inpatient admission is appropriate. Patient had ERCP yesterday and now presents with high fever,hypotension and tachycardia consistent with sepsis. In ICU with BP support and IV hydration and IV antibiotics. The severity of illness and intensity of service requires Inpatient admission. Robb Chirinos, [facility].  Care Management

## 2017-08-25 NOTE — ANESTHESIA POSTPROCEDURE EVALUATION
Post-Anesthesia Evaluation and Assessment    Patient: Isa Britton MRN: [id number]  SSN: [id number]    YOB: 1955  Age: 62 y.o. Sex: male       Cardiovascular Function/Vital Signs  Visit Vitals    BP (!) 83/51    Pulse 88    Temp 36.7 °C (98.1 °F)    Resp 18    Ht 5' 8.5\" (1.74 m)    Wt 90.8 kg (200 lb 2.8 oz)    SpO2 94%    BMI 29.99 kg/m2       Patient is status post general anesthesia for Procedure(s):  ENDOSCOPIC RETROGRADE CHOLANGIOPANCREATOGRAPHY DIRECT VISUALIZATION  ENDOSCOPIC RETROGRADE CHOLANGIOPANCREATOGRAPHY  ENDOSCOPY WITH PROSTHESIS OR STENT REMOVAL  ESOPHAGOGASTRODUODENAL (EGD) BIOPSY  ENDOSCOPIC STONE EXTRACTION/BALLOON SWEEP  BILIARY STENT PLACEMENT. Nausea/Vomiting: None    Postoperative hydration reviewed and adequate. Pain:  Pain Scale 1: Numeric (0 - 10) (08/25/17 0905)  Pain Intensity 1: 7 (08/25/17 0905)   Managed    Neurological Status:   Neuro (WDL): Exceptions to WDL (08/24/17 2045)  Neuro  Neurologic State: Alert;Eyes open spontaneously (08/25/17 0042)  Orientation Level: Oriented X4 (08/25/17 0042)  Cognition: Appropriate decision making; Appropriate for age attention/concentration; Appropriate safety awareness; Follows commands (08/25/17 0042)  Speech: Clear (08/25/17 0042)  Assessment L Pupil: Brisk;Round (08/25/17 0042)  Size L Pupil (mm): 3 (08/25/17 0042)  Assessment R Pupil: Brisk;Round (08/25/17 0042)  Size R Pupil (mm): 3 (08/25/17 0042)  LUE Motor Response: Purposeful;Spontaneous  (08/25/17 0042)  LLE Motor Response: Purposeful;Spontaneous  (08/25/17 0042)  RUE Motor Response: Purposeful;Spontaneous  (08/25/17 0042)  RLE Motor Response: Purposeful;Spontaneous  (08/25/17 0042)   At baseline    Mental Status and Level of Consciousness: Arousable    Pulmonary Status:   O2 Device: Nasal cannula (08/25/17 0441)   Adequate oxygenation and airway patent    Complications related to anesthesia: None    Post-anesthesia assessment completed.  No concerns    Signed By: Jeimy Leahy MD     August 25, 2017

## 2017-08-26 LAB
ALBUMIN SERPL-MCNC: 2.4 G/DL (ref 3.5–5)
ALBUMIN/GLOB SERPL: 1 {RATIO} (ref 1.1–2.2)
ALP SERPL-CCNC: 163 U/L (ref 45–117)
ALT SERPL-CCNC: 206 U/L (ref 12–78)
AMYLASE SERPL-CCNC: 25 U/L (ref 25–115)
ANION GAP SERPL CALC-SCNC: 14 MMOL/L (ref 5–15)
AST SERPL-CCNC: 188 U/L (ref 15–37)
BASOPHILS # BLD: 0 K/UL (ref 0–0.1)
BASOPHILS NFR BLD: 0 % (ref 0–1)
BILIRUB SERPL-MCNC: 7.9 MG/DL (ref 0.2–1)
BUN SERPL-MCNC: 36 MG/DL (ref 6–20)
BUN/CREAT SERPL: 15 (ref 12–20)
CALCIUM SERPL-MCNC: 7.3 MG/DL (ref 8.5–10.1)
CHLORIDE SERPL-SCNC: 103 MMOL/L (ref 97–108)
CO2 SERPL-SCNC: 16 MMOL/L (ref 21–32)
CREAT SERPL-MCNC: 2.39 MG/DL (ref 0.7–1.3)
DIFFERENTIAL METHOD BLD: ABNORMAL
EOSINOPHIL # BLD: 0 K/UL (ref 0–0.4)
EOSINOPHIL NFR BLD: 0 % (ref 0–7)
ERYTHROCYTE [DISTWIDTH] IN BLOOD BY AUTOMATED COUNT: 15.5 % (ref 11.5–14.5)
GLOBULIN SER CALC-MCNC: 2.5 G/DL (ref 2–4)
GLUCOSE SERPL-MCNC: 125 MG/DL (ref 65–100)
HCT VFR BLD AUTO: 37.5 % (ref 36.6–50.3)
HGB BLD-MCNC: 12.7 G/DL (ref 12.1–17)
LIPASE SERPL-CCNC: 64 U/L (ref 73–393)
LYMPHOCYTES # BLD: 0.6 K/UL (ref 0.8–3.5)
LYMPHOCYTES NFR BLD: 3 % (ref 12–49)
MAGNESIUM SERPL-MCNC: 2 MG/DL (ref 1.6–2.4)
MCH RBC QN AUTO: 30.5 PG (ref 26–34)
MCHC RBC AUTO-ENTMCNC: 33.9 G/DL (ref 30–36.5)
MCV RBC AUTO: 89.9 FL (ref 80–99)
MONOCYTES # BLD: 0.6 K/UL (ref 0–1)
MONOCYTES NFR BLD: 3 % (ref 5–13)
MYELOCYTES NFR BLD MANUAL: 1 %
NEUTS BAND NFR BLD MANUAL: 13 % (ref 0–6)
NEUTS SEG # BLD: 17.9 K/UL (ref 1.8–8)
NEUTS SEG NFR BLD: 80 % (ref 32–75)
PHOSPHATE SERPL-MCNC: 3.4 MG/DL (ref 2.6–4.7)
PLATELET # BLD AUTO: 60 K/UL (ref 150–400)
POTASSIUM SERPL-SCNC: 3.9 MMOL/L (ref 3.5–5.1)
PROT SERPL-MCNC: 4.9 G/DL (ref 6.4–8.2)
RBC # BLD AUTO: 4.17 M/UL (ref 4.1–5.7)
RBC MORPH BLD: ABNORMAL
RBC MORPH BLD: ABNORMAL
SODIUM SERPL-SCNC: 133 MMOL/L (ref 136–145)
VANCOMYCIN SERPL-MCNC: 6.7 UG/ML
WBC # BLD AUTO: 19.2 K/UL (ref 4.1–11.1)
WBC MORPH BLD: ABNORMAL

## 2017-08-26 PROCEDURE — 84100 ASSAY OF PHOSPHORUS: CPT | Performed by: INTERNAL MEDICINE

## 2017-08-26 PROCEDURE — 74011250637 HC RX REV CODE- 250/637: Performed by: SPECIALIST

## 2017-08-26 PROCEDURE — 74011000258 HC RX REV CODE- 258: Performed by: HOSPITALIST

## 2017-08-26 PROCEDURE — 83690 ASSAY OF LIPASE: CPT | Performed by: INTERNAL MEDICINE

## 2017-08-26 PROCEDURE — 80202 ASSAY OF VANCOMYCIN: CPT | Performed by: HOSPITALIST

## 2017-08-26 PROCEDURE — 77010033678 HC OXYGEN DAILY

## 2017-08-26 PROCEDURE — 74011000250 HC RX REV CODE- 250: Performed by: INTERNAL MEDICINE

## 2017-08-26 PROCEDURE — 83735 ASSAY OF MAGNESIUM: CPT | Performed by: INTERNAL MEDICINE

## 2017-08-26 PROCEDURE — 74011250636 HC RX REV CODE- 250/636: Performed by: FAMILY MEDICINE

## 2017-08-26 PROCEDURE — 74011000250 HC RX REV CODE- 250: Performed by: FAMILY MEDICINE

## 2017-08-26 PROCEDURE — 74011250636 HC RX REV CODE- 250/636: Performed by: SURGERY

## 2017-08-26 PROCEDURE — 74011000250 HC RX REV CODE- 250: Performed by: HOSPITALIST

## 2017-08-26 PROCEDURE — 74011000258 HC RX REV CODE- 258: Performed by: INTERNAL MEDICINE

## 2017-08-26 PROCEDURE — 74011250637 HC RX REV CODE- 250/637: Performed by: INTERNAL MEDICINE

## 2017-08-26 PROCEDURE — 85025 COMPLETE CBC W/AUTO DIFF WBC: CPT | Performed by: INTERNAL MEDICINE

## 2017-08-26 PROCEDURE — 80053 COMPREHEN METABOLIC PANEL: CPT | Performed by: INTERNAL MEDICINE

## 2017-08-26 PROCEDURE — 36415 COLL VENOUS BLD VENIPUNCTURE: CPT | Performed by: HOSPITALIST

## 2017-08-26 PROCEDURE — 82150 ASSAY OF AMYLASE: CPT | Performed by: INTERNAL MEDICINE

## 2017-08-26 PROCEDURE — 74011250636 HC RX REV CODE- 250/636: Performed by: HOSPITALIST

## 2017-08-26 PROCEDURE — 65610000006 HC RM INTENSIVE CARE

## 2017-08-26 RX ORDER — IPRATROPIUM BROMIDE AND ALBUTEROL SULFATE 2.5; .5 MG/3ML; MG/3ML
3 SOLUTION RESPIRATORY (INHALATION)
Status: DISCONTINUED | OUTPATIENT
Start: 2017-08-26 | End: 2017-08-31 | Stop reason: HOSPADM

## 2017-08-26 RX ORDER — DOCUSATE SODIUM 100 MG/1
100 CAPSULE, LIQUID FILLED ORAL 2 TIMES DAILY
Status: DISCONTINUED | OUTPATIENT
Start: 2017-08-26 | End: 2017-08-31 | Stop reason: HOSPADM

## 2017-08-26 RX ORDER — PROCHLORPERAZINE EDISYLATE 5 MG/ML
5 INJECTION INTRAMUSCULAR; INTRAVENOUS
Status: DISCONTINUED | OUTPATIENT
Start: 2017-08-26 | End: 2017-08-31 | Stop reason: HOSPADM

## 2017-08-26 RX ORDER — VANCOMYCIN HYDROCHLORIDE
1250
Status: DISCONTINUED | OUTPATIENT
Start: 2017-08-26 | End: 2017-08-31 | Stop reason: HOSPADM

## 2017-08-26 RX ORDER — ONDANSETRON 2 MG/ML
4 INJECTION INTRAMUSCULAR; INTRAVENOUS
Status: DISCONTINUED | OUTPATIENT
Start: 2017-08-26 | End: 2017-08-26

## 2017-08-26 RX ORDER — ONDANSETRON 2 MG/ML
4 INJECTION INTRAMUSCULAR; INTRAVENOUS
Status: DISCONTINUED | OUTPATIENT
Start: 2017-08-26 | End: 2017-08-31 | Stop reason: HOSPADM

## 2017-08-26 RX ADMIN — AZTREONAM 1 G: 1 INJECTION, POWDER, LYOPHILIZED, FOR SOLUTION INTRAMUSCULAR; INTRAVENOUS at 05:16

## 2017-08-26 RX ADMIN — Medication 13 MCG/MIN: at 09:00

## 2017-08-26 RX ADMIN — Medication 10 ML: at 14:21

## 2017-08-26 RX ADMIN — ONDANSETRON 4 MG: 2 INJECTION INTRAMUSCULAR; INTRAVENOUS at 14:30

## 2017-08-26 RX ADMIN — VANCOMYCIN HYDROCHLORIDE 1250 MG: 10 INJECTION, POWDER, LYOPHILIZED, FOR SOLUTION INTRAVENOUS at 21:10

## 2017-08-26 RX ADMIN — SODIUM CHLORIDE 125 ML/HR: 900 INJECTION, SOLUTION INTRAVENOUS at 00:26

## 2017-08-26 RX ADMIN — Medication 10 ML: at 05:22

## 2017-08-26 RX ADMIN — SODIUM CHLORIDE 125 ML/HR: 900 INJECTION, SOLUTION INTRAVENOUS at 08:28

## 2017-08-26 RX ADMIN — HYDROMORPHONE HYDROCHLORIDE 1 MG: 1 INJECTION, SOLUTION INTRAMUSCULAR; INTRAVENOUS; SUBCUTANEOUS at 08:34

## 2017-08-26 RX ADMIN — AZTREONAM 2 G: 2 INJECTION, POWDER, LYOPHILIZED, FOR SOLUTION INTRAMUSCULAR; INTRAVENOUS at 21:26

## 2017-08-26 RX ADMIN — Medication 10 ML: at 08:34

## 2017-08-26 RX ADMIN — Medication 17 MCG/MIN: at 00:26

## 2017-08-26 RX ADMIN — GABAPENTIN 900 MG: 300 CAPSULE ORAL at 09:41

## 2017-08-26 RX ADMIN — VANCOMYCIN HYDROCHLORIDE 1250 MG: 10 INJECTION, POWDER, LYOPHILIZED, FOR SOLUTION INTRAVENOUS at 05:16

## 2017-08-26 RX ADMIN — ONDANSETRON 4 MG: 2 INJECTION INTRAMUSCULAR; INTRAVENOUS at 05:32

## 2017-08-26 RX ADMIN — DOCUSATE SODIUM 100 MG: 100 CAPSULE, LIQUID FILLED ORAL at 18:12

## 2017-08-26 RX ADMIN — Medication 20 ML: at 13:45

## 2017-08-26 RX ADMIN — Medication 10 ML: at 21:10

## 2017-08-26 RX ADMIN — PROCHLORPERAZINE EDISYLATE 5 MG: 5 INJECTION INTRAMUSCULAR; INTRAVENOUS at 09:50

## 2017-08-26 RX ADMIN — GABAPENTIN 900 MG: 300 CAPSULE ORAL at 18:12

## 2017-08-26 RX ADMIN — METRONIDAZOLE 500 MG: 500 INJECTION, SOLUTION INTRAVENOUS at 07:09

## 2017-08-26 RX ADMIN — METRONIDAZOLE 500 MG: 500 INJECTION, SOLUTION INTRAVENOUS at 14:15

## 2017-08-26 RX ADMIN — LEVOFLOXACIN 750 MG: 5 INJECTION, SOLUTION INTRAVENOUS at 21:09

## 2017-08-26 RX ADMIN — AZTREONAM 2 G: 2 INJECTION, POWDER, LYOPHILIZED, FOR SOLUTION INTRAMUSCULAR; INTRAVENOUS at 12:34

## 2017-08-26 RX ADMIN — Medication 40 ML: at 12:14

## 2017-08-26 NOTE — PROGRESS NOTES
[address]    GI PROGRESS NOTE        NAME:   Manny Morales   :    1955   MRN:    [id number]     Assessment/Plan   CBD stricture- with obstructive jaundice and septic shock s/p ERCP with Spyglass and metal stent placement. He developed severe abdominal pain, fever and hypotension post-procedure. WBC and LFTs elevated. Concern raised for gallbladder/cystic duct obstruction from stent. If he clinically decompensates further, would advise IR consultation for percutaneous cholecystostomy tube placement.  - Supportive management per ICU team   - Continue antibiotics  - Follow cultures - NGTD  - Trend labs - bilirubin trending down, but minimally  - Await pathology results  - Follow up Surgery recommendations  - Monitor clinical course closely      Patient Active Problem List   Diagnosis Code    Obstructive jaundice K83.8    Common bile duct (CBD) obstruction K83.1    UTI (urinary tract infection) N39.0    Sepsis ([address] A41.9       Subjective:     Per previous notes:He was initially seen at the end of July for obstructive jaundice. CT scan revealed intrahepatic and CBD dilatation with narrowing in mid CBD. ERCP demonstrated CBD stricture for which stent was placed. Brushings did not show any evidence of malignancy. CA 19-9 was not elevated. Subsequent EUS showed stent with possible sludge/debris in it. No mass was seen. ERCP with spyglass was then recommended. This was completed yesterday which demonstrated CBD stricture with irregular, friable and abnormal mucosa. Multiple biopsies were obtained and metal stent was placed. Post-procedure he had severe abdominal pain, fever and hypotension. Work-up was completed, he was started on pressors and transferred to the ICU for further monitoring. WBC elevated. LFTs remain elevated. CT scan demonstrated stent placement and pneumobilia, but was otherwise unremarkable. He is feeling michael this morning.  He reports improvement in his abdominal pain. Denies any nausea or vomiting. Afebrile overnight. BP remains low.     8/26/17: off pressors per nursing staff (although still ordered), afebrile. Bilirubin is trending down minimally (8.6 down to 7.9). On broad spectrum antibiotics. Blood cx show NGTD. Nausea with vomiting. Review of Systems    Constitutional: positive fever, negative chills, negative weight loss  Eyes:   negative visual changes  ENT:   negative sore throat, tongue or lip swelling  Respiratory:  negative cough, negative dyspnea  Cards:  negative for chest pain, palpitations, lower extremity edema  GI:   See HPI  :  negative for frequency, dysuria  Integument:  negative for rash and pruritus  Heme:  negative for easy bruising and gum/nose bleeding  Musculoskel: negative for myalgias, back pain and muscle weakness  Neuro: negative for headaches, dizziness, vertigo  Psych:  negative for feelings of anxiety, depression     Objective:     VITALS:   Last 24hrs VS reviewed since prior hospitalist progress note. Most recent are:  Visit Vitals    /76    Pulse 88    Temp 97.7 °F (36.5 °C)    Resp 18    Ht 5' 8.5\" (1.74 m)    Wt 92 kg (202 lb 13.2 oz)    SpO2 96%    BMI 30.39 kg/m2       Intake/Output Summary (Last 24 hours) at 08/26/17 1230  Last data filed at 08/26/17 0516   Gross per 24 hour   Intake          4474.65 ml   Output             2025 ml   Net          2449.65 ml        PHYSICAL EXAM:  General   well developed, alert, jaundiced, in no acute distress  EENT  Normocephalic, Atraumatic, scleral icterus    Abdominal  Soft, non-distended, mild RUQ tenderness, hypoactive bowel sounds, no hepatosplenomegaly, no palpable mass    Psychological  Oriented x 3. Normal affect.        Lab Data   Recent Results (from the past 12 hour(s))   VANCOMYCIN, RANDOM    Collection Time: 08/26/17  5:09 AM   Result Value Ref Range    Vancomycin, random 6.7 UG/ML   CBC WITH AUTOMATED DIFF    Collection Time: 08/26/17  5:09 AM   Result Value Ref Range    WBC 19.2 (H) 4.1 - 11.1 K/uL    RBC 4.17 4.10 - 5.70 M/uL    HGB 12.7 12.1 - 17.0 g/dL    HCT 37.5 36.6 - 50.3 %    MCV 89.9 80.0 - 99.0 FL    MCH 30.5 26.0 - 34.0 PG    MCHC 33.9 30.0 - 36.5 g/dL    RDW 15.5 (H) 11.5 - 14.5 %    PLATELET 60 (L) 515 - 400 K/uL    NEUTROPHILS 80 (H) 32 - 75 %    BAND NEUTROPHILS 13 (H) 0 - 6 %    LYMPHOCYTES 3 (L) 12 - 49 %    MONOCYTES 3 (L) 5 - 13 %    EOSINOPHILS 0 0 - 7 %    BASOPHILS 0 0 - 1 %    MYELOCYTES 1 (H) 0 %    ABS. NEUTROPHILS 17.9 (H) 1.8 - 8.0 K/UL    ABS. LYMPHOCYTES 0.6 (L) 0.8 - 3.5 K/UL    ABS. MONOCYTES 0.6 0.0 - 1.0 K/UL    ABS. EOSINOPHILS 0.0 0.0 - 0.4 K/UL    ABS. BASOPHILS 0.0 0.0 - 0.1 K/UL    DF MANUAL      RBC COMMENTS ANISOCYTOSIS  1+        RBC COMMENTS RIGO CELLS  PRESENT        WBC COMMENTS DOHLE BODIES     MAGNESIUM    Collection Time: 08/26/17  5:09 AM   Result Value Ref Range    Magnesium 2.0 1.6 - 2.4 mg/dL   METABOLIC PANEL, COMPREHENSIVE    Collection Time: 08/26/17  5:09 AM   Result Value Ref Range    Sodium 133 (L) 136 - 145 mmol/L    Potassium 3.9 3.5 - 5.1 mmol/L    Chloride 103 97 - 108 mmol/L    CO2 16 (L) 21 - 32 mmol/L    Anion gap 14 5 - 15 mmol/L    Glucose 125 (H) 65 - 100 mg/dL    BUN 36 (H) 6 - 20 MG/DL    Creatinine 2.39 (H) 0.70 - 1.30 MG/DL    BUN/Creatinine ratio 15 12 - 20      GFR est AA 34 (L) >60 ml/min/1.73m2    GFR est non-AA 28 (L) >60 ml/min/1.73m2    Calcium 7.3 (L) 8.5 - 10.1 MG/DL    Bilirubin, total 7.9 (H) 0.2 - 1.0 MG/DL    ALT (SGPT) 206 (H) 12 - 78 U/L    AST (SGOT) 188 (H) 15 - 37 U/L    Alk.  phosphatase 163 (H) 45 - 117 U/L    Protein, total 4.9 (L) 6.4 - 8.2 g/dL    Albumin 2.4 (L) 3.5 - 5.0 g/dL    Globulin 2.5 2.0 - 4.0 g/dL    A-G Ratio 1.0 (L) 1.1 - 2.2     PHOSPHORUS    Collection Time: 08/26/17  5:09 AM   Result Value Ref Range    Phosphorus 3.4 2.6 - 4.7 MG/DL   AMYLASE    Collection Time: 08/26/17  5:09 AM   Result Value Ref Range    Amylase 25 25 - 115 U/L   LIPASE    Collection Time: 08/26/17  5:09 AM   Result Value Ref Range    Lipase 64 (L) 73 - 393 U/L         Medications: Reviewed    PMH/SH reviewed - no change compared to H&P  Date/Time:  8/26/2017        Samuel Odonnell MD

## 2017-08-26 NOTE — PROGRESS NOTES
Pharmacist Note - Vancomycin Dosing  Therapy day 2  Indication: sepsis  Current regimen: 2000 mg LD     A Random Level resulted at 6.7 mcg/mL which was obtained 27 hrs post-dose. Goal trough: 15 - 20 mcg/mL     Plan: Change to 1250 mg q18 hr . Pharmacy will continue to monitor this patient daily for changes in clinical status and renal function.

## 2017-08-26 NOTE — CONSULTS
Infectious Disease Consult    Today's Date: 8/26/2017   Admit Date: 8/24/2017    Impression:   · Cholangitis/biliary sepsis   · Obstructive lesion  · Recent ERCPs  · PCN allergy of uncertain type--never has taken any sort of beta lactam antibiotic, according to patient     Plan:   · Continue broad antibiotic coverage pending culture results--complicated regimen in face of pcn allergy    Anti-infectives:     Inpat Anti-Infectives     Start     Ordered Stop    08/26/17 2200  levoFLOXacin (LEVAQUIN) 750 mg in D5W IVPB  750 mg,   IntraVENous,   EVERY 48 HOURS      08/25/17 0233 --    08/26/17 2200  vancomycin (VANCOCIN) 1250 mg in  ml infusion  1,250 mg,   IntraVENous,   EVERY 18 HOURS      08/26/17 0701 --    08/26/17 1300  aztreonam (AZACTAM) 2 g in 0.9% sodium chloride (MBP/ADV) 100 mL  2 g,   IntraVENous,   EVERY 8 HOURS     Comments:  First dose Stat    08/26/17 1200 --    08/25/17 1018  bacitracin 500 unit/gram packet 1 Packet  1 Packet,   Topical,   AS NEEDED      08/25/17 1018 --    08/25/17 0035  VAncomycin Pharmacy Dosing  Other,   RX DOSING/MONITORING      08/25/17 0035 --    08/24/17 2300  metroNIDAZOLE (FLAGYL) IVPB premix 500 mg  500 mg,   IntraVENous,   EVERY 8 HOURS      08/24/17 2239 --          Subjective:   Date of Consultation:  August 26, 2017  Referring Physician: Dr Ramiro De La Rosa    Patient is a 62 y.o. male admitted with fever and hypotension after ERCP. He presented earlier in the summer with painless jaundice and was found to have biliary obstruction. He has had 3 stents placed over the last month or two, the last one the day of admission. He is admitted and is on broad antibiotic coverage. He has a childhood pcn allergy and was told never to take penicillin. He doesn't recall the allergy and there is no one to check with.  He hasn't been on any beta lactam antibiotics in the past.     Patient Active Problem List   Diagnosis Code    Obstructive jaundice K83.8    Common bile duct (CBD) obstruction K83.1    UTI (urinary tract infection) N39.0    Sepsis (HCC) A41.9     Past Medical History:   Diagnosis Date    Autoimmune disease ([address]     Hypertension     Ill-defined condition     Previous a.fib, ablation, NSR now      Family History   Problem Relation Age of Onset    Cancer Father      Breast and Colon      Social History   Substance Use Topics    Smoking status: Never Smoker    Smokeless tobacco: Never Used    Alcohol use No     Past Surgical History:   Procedure Laterality Date    CARDIAC SURG PROCEDURE UNLIST      Ablation     HX ORTHOPAEDIC      Spinal fusion     NEUROLOGICAL PROCEDURE UNLISTED      Watt Ethel hole washout from cerebral hemorrhage      Prior to Admission medications    Medication Sig Start Date End Date Taking? Authorizing Provider   gabapentin (NEURONTIN) 300 mg capsule Take 900 mg by mouth two (2) times a day. 3 x 300mg caps (900mg) twice daily   Yes Historical Provider   ramipril (ALTACE) 10 mg capsule Take 10 mg by mouth daily. Yes Phys Other, MD   cholecalciferol (VITAMIN D3) 1,000 unit cap Take 1,000 Units by mouth daily. Yes Phys Other, MD   cholestyramine-sucrose (QUESTRAN) 4 gram powder Take 4 g by mouth two (2) times a day. Take 1 hour before other medications or meals 17   Eric Zavala MD   cyanocobalamin (VITAMIN B-12) 1,000 mcg/mL injection 1,000 mcg by IntraMUSCular route once. Indications: VITAMIN B12 DEFICIENCY, Twice a month    Phys Other, MD       Allergies   Allergen Reactions    Pcn [Penicillins] Other (comments)     Pt stated \"always been told PCN\"        Review of Systems:  Pertinent items are noted in the History of Present Illness.     Objective:     Visit Vitals    /78    Pulse (!) 102    Temp 96.6 °F (35.9 °C)    Resp 17    Ht 5' 8.5\" (1.74 m)    Wt 92 kg (202 lb 13.2 oz)    SpO2 98%    BMI 30.39 kg/m2     Temp (24hrs), Av.3 °F (36.3 °C), Min:96.6 °F (35.9 °C), Max:97.7 °F (36.5 °C)       Lines:  Central Venous Catheter:            Physical Exam:  General:  alert, cooperative, no distress  Lungs:  clear to auscultation bilaterally  Heart:  regular rate and rhythm  Abdomen:  abnormal findings:  tenderness mild in the upper abdomen  Skin:  no rash or abnormalities    Data Review:     CBC:  Recent Labs      08/26/17   0509  08/25/17 0418 08/25/17   0013   WBC  19.2*  18.4*  3.7*   GRANS  80*  74  82*   MONOS  3*  2*  2*   EOS  0  0  0   ANEU  17.9*  17.5*  3.4   ABL  0.6*  0.2*  0.1*   HGB  12.7  13.0  11.9*   HCT  37.5  38.3  35.2*   PLT  60*  74*  51*       BMP:  Recent Labs      08/26/17   0509 08/25/17 0418 08/25/17   0013   CREA  2.39*  2.92*  2.51*  2.29*   BUN  36*  30*  26*  25*   NA  133*  136  135*  139   K  3.9  3.2*  3.4*  2.9*   CL  103  105  104  107   CO2  16*  17*  18*  18*   AGAP  14  14  13  14   GLU  125*  174*  147*  134*       LFTS:  Recent Labs      08/26/17   0509  08/25/17 0418 08/25/17   0013   TBILI  7.9*  8.6*  7.9*  7.1*   ALT  206*  239*  243*  215*   SGOT  188*  125*  133*  115*   AP  163*  149*  179*  158*   TP  4.9*  4.5*  4.4*  3.9*   ALB  2.4*  2.3*  2.2*  2.0*       Microbiology:     All Micro Results     Procedure Component Value Units Date/Time    CULTURE, BLOOD, PAIRED [[id number]] Collected:  08/25/17 0013    Order Status:  Completed Specimen:  Blood Updated:  08/26/17 0522     Special Requests: NO SPECIAL REQUESTS        Culture result: NO GROWTH AFTER 23 HOURS       CULTURE, BLOOD [[id number]]     Order Status:  Sent Specimen:  Blood from Blood     CULTURE, BLOOD [[id number]]     Order Status:  Sent Specimen:  Blood from Blood           Imaging:   Results noted    Signed By: Samy Garcia MD     August 26, 2017

## 2017-08-26 NOTE — PROGRESS NOTES
Progress Note    Patient: Radha Thompson MRN: [id number]  SSN: [id number]    YOB: 1955  Age: 62 y.o. Sex: male      Admit Date: 2017    2 Days Post-Op    Procedure:  Procedure(s):  ENDOSCOPIC RETROGRADE CHOLANGIOPANCREATOGRAPHY DIRECT VISUALIZATION  ENDOSCOPIC RETROGRADE CHOLANGIOPANCREATOGRAPHY  ENDOSCOPY WITH PROSTHESIS OR STENT REMOVAL  ESOPHAGOGASTRODUODENAL (EGD) BIOPSY  ENDOSCOPIC STONE EXTRACTION/BALLOON SWEEP  BILIARY STENT PLACEMENT    Subjective:     No acute surgical issues. Pt reported some nausea and spitting up sputum. Pt currently on 14 mcg Levophed. Pain is well controlled. WBC still elevated but stable. Objective:     Visit Vitals    /74    Pulse 92    Temp 97.7 °F (36.5 °C)    Resp 15    Ht 5' 8.5\" (1.74 m)    Wt 202 lb 13.2 oz (92 kg)    SpO2 96%    BMI 30.39 kg/m2       Temp (24hrs), Av.4 °F (36.3 °C), Min:97.1 °F (36.2 °C), Max:97.7 °F (36.5 °C)        Physical Exam:    Gen:  NAD  Pulm:  Diminished  Abd:  S/moderately distended/mild TTP    Recent Results (from the past 24 hour(s))   VANCOMYCIN, RANDOM    Collection Time: 17  5:09 AM   Result Value Ref Range    Vancomycin, random 6.7 UG/ML   CBC WITH AUTOMATED DIFF    Collection Time: 17  5:09 AM   Result Value Ref Range    WBC 19.2 (H) 4.1 - 11.1 K/uL    RBC 4.17 4.10 - 5.70 M/uL    HGB 12.7 12.1 - 17.0 g/dL    HCT 37.5 36.6 - 50.3 %    MCV 89.9 80.0 - 99.0 FL    MCH 30.5 26.0 - 34.0 PG    MCHC 33.9 30.0 - 36.5 g/dL    RDW 15.5 (H) 11.5 - 14.5 %    PLATELET 60 (L) 752 - 400 K/uL    NEUTROPHILS 80 (H) 32 - 75 %    BAND NEUTROPHILS 13 (H) 0 - 6 %    LYMPHOCYTES 3 (L) 12 - 49 %    MONOCYTES 3 (L) 5 - 13 %    EOSINOPHILS 0 0 - 7 %    BASOPHILS 0 0 - 1 %    MYELOCYTES 1 (H) 0 %    ABS. NEUTROPHILS 17.9 (H) 1.8 - 8.0 K/UL    ABS. LYMPHOCYTES 0.6 (L) 0.8 - 3.5 K/UL    ABS. MONOCYTES 0.6 0.0 - 1.0 K/UL    ABS. EOSINOPHILS 0.0 0.0 - 0.4 K/UL    ABS.  BASOPHILS 0.0 0.0 - 0.1 K/UL    DF MANUAL RBC COMMENTS ANISOCYTOSIS  1+        RBC COMMENTS RIGO CELLS  PRESENT        WBC COMMENTS DOHLE BODIES     MAGNESIUM    Collection Time: 08/26/17  5:09 AM   Result Value Ref Range    Magnesium 2.0 1.6 - 2.4 mg/dL   METABOLIC PANEL, COMPREHENSIVE    Collection Time: 08/26/17  5:09 AM   Result Value Ref Range    Sodium 133 (L) 136 - 145 mmol/L    Potassium 3.9 3.5 - 5.1 mmol/L    Chloride 103 97 - 108 mmol/L    CO2 16 (L) 21 - 32 mmol/L    Anion gap 14 5 - 15 mmol/L    Glucose 125 (H) 65 - 100 mg/dL    BUN 36 (H) 6 - 20 MG/DL    Creatinine 2.39 (H) 0.70 - 1.30 MG/DL    BUN/Creatinine ratio 15 12 - 20      GFR est AA 34 (L) >60 ml/min/1.73m2    GFR est non-AA 28 (L) >60 ml/min/1.73m2    Calcium 7.3 (L) 8.5 - 10.1 MG/DL    Bilirubin, total 7.9 (H) 0.2 - 1.0 MG/DL    ALT (SGPT) 206 (H) 12 - 78 U/L    AST (SGOT) 188 (H) 15 - 37 U/L    Alk.  phosphatase 163 (H) 45 - 117 U/L    Protein, total 4.9 (L) 6.4 - 8.2 g/dL    Albumin 2.4 (L) 3.5 - 5.0 g/dL    Globulin 2.5 2.0 - 4.0 g/dL    A-G Ratio 1.0 (L) 1.1 - 2.2     PHOSPHORUS    Collection Time: 08/26/17  5:09 AM   Result Value Ref Range    Phosphorus 3.4 2.6 - 4.7 MG/DL   AMYLASE    Collection Time: 08/26/17  5:09 AM   Result Value Ref Range    Amylase 25 25 - 115 U/L   LIPASE    Collection Time: 08/26/17  5:09 AM   Result Value Ref Range    Lipase 64 (L) 73 - 393 U/L         Assessment:     Hospital Problems  Date Reviewed: 8/24/2017          Codes Class Noted POA    Sepsis ([facility] [address] ICD-10-CM: A41.9  ICD-9-CM: 038.9, 995.91  8/25/2017 Unknown              Plan/Recommendations/Medical Decision Making:     - Continue supportive care with pressor  - Sips for now  - Monitor labs  - No acute indication for cholecystectomy  - Continue antibiotic    Signed By: Neto Head MD     August 26, 2017

## 2017-08-26 NOTE — PROGRESS NOTES
Hospitalist Progress Note  Nuria Irizarry MD  Office: [phone]        Date of Service:  2017  NAME:  Cheryl Martinez  :  1955  MRN:  [id number]      Admission Summary:   Patient was admitted to the intensive care unit for management of sepsis. Patient has obstructive jaundice and biliary stricture. He underwent ERCP and had his biliary stent replaced yesterday. This procedure has had fevers and hypotension. Central line is in place he has been on broad antibiotics with aztreonam Flagyl and vancomycin    Interval history / Subjective:   Patient still on pressors, but overall feeling better. He is still looking fairly ill. Wife at bedside, she is a nurse. Assessment & Plan:     1. Sepsis with Septic shock from cholangiolitis following - ERCP with biopsy of biliary stricture and placement of a biliary stent. On Levophed for blood pressure support. Lactic acid level is decreasing.   - continue to wean off pressors as able;  - ID consulted;    2. Cont abx- follow up blood cultures    3. Acute kidney injury- fluid resuscitation and keep MAP > 60 on pressors     4. Hyperglycemia cont SSI    5. Leukocytosis on abx; lactate decreasing    6. Replete lytes mag / ca and repeat labs    7. Abnormal LFT's from hypotension sepsis and also underlying GB dz    8. Hypokalemia- replete per protocol      Code status: Full  DVT prophylaxis: SCD    Care Plan discussed with: Patient/Family and Nurse  Disposition: TBD     Hospital Problems  Date Reviewed: 2017          Codes Class Noted POA    Sepsis ([facility] [address] ICD-10-CM: A41.9  ICD-9-CM: 038.9, 995.91  2017 Unknown                Review of Systems:   A comprehensive review of systems was negative. Vital Signs:    Last 24hrs VS reviewed since prior progress note.  Most recent are:  Visit Vitals    /77    Pulse (!) 105    Temp 98.4 °F (36.9 °C)    Resp 21    Ht 5' 8.5\" (1.74 m)    Wt 92 kg (202 lb 13.2 oz)    SpO2 95%    BMI 30.39 kg/m2         Intake/Output Summary (Last 24 hours) at 08/26/17 1626  Last data filed at 08/26/17 1100   Gross per 24 hour   Intake          3444.26 ml   Output             2725 ml   Net           719.26 ml        Physical Examination:             Constitutional:  No acute distress, on NC;   ENT:  Oral mucous moist    Resp:  CTA bilaterally. No wheezing/rhonchi/rales. Slightly prolonged expiratory phase  Not tachypneu   CV:  Regular rhythm, normal rate    GI:  Soft, non distended, non tender. Musculoskeletal:  No edema, warm;    Neurologic:  Moves all extremities. AAOx3;     Psych:  Good insight, Not anxious nor agitated. Data Review:    I personally reviewed  Image and labs      Labs:     Recent Labs      08/26/17   0509 08/25/17 0418   WBC  19.2*  18.4*   HGB  12.7  13.0   HCT  37.5  38.3   PLT  60*  74*     Recent Labs      08/26/17   0509 08/25/17 0418 08/25/17   0013   NA  133*  136  135*  139   K  3.9  3.2*  3.4*  2.9*   CL  103  105  104  107   CO2  16*  17*  18*  18*   BUN  36*  30*  26*  25*   CREA  2.39*  2.92*  2.51*  2.29*   GLU  125*  174*  147*  134*   CA  7.3*  6.9*  7.0*  6.6*   MG  2.0  1.4*   --    PHOS  3.4   --   1.6*     Recent Labs      08/26/17   0509 08/25/17 0418  08/25/17   0013   SGOT  188*  125*  133*  115*   ALT  206*  239*  243*  215*   AP  163*  149*  179*  158*   TBILI  7.9*  8.6*  7.9*  7.1*   TP  4.9*  4.5*  4.4*  3.9*   ALB  2.4*  2.3*  2.2*  2.0*   GLOB  2.5  2.2  2.2  1.9*   AML  25   --    --    LPSE  64*   --   271     No results for input(s): INR, PTP, APTT in the last 72 hours. No lab exists for component: INREXT, INREXT   No results for input(s): FE, TIBC, PSAT, FERR in the last 72 hours. No results found for: FOL, RBCF   No results for input(s): PH, PCO2, PO2 in the last 72 hours.   Recent Labs      08/25/17   0015   TROIQ  <0.04     Lab Results   Component Value Date/Time Cholesterol, total 53 08/25/2017 12:13 AM    HDL Cholesterol 13 08/25/2017 12:13 AM    LDL, calculated 17.8 08/25/2017 12:13 AM    Triglyceride 111 08/25/2017 12:13 AM    CHOL/HDL Ratio 4.1 08/25/2017 12:13 AM     No results found for: GLUCPOC  Lab Results   Component Value Date/Time    Color ORANGE 08/25/2017 03:18 AM    Appearance TURBID 08/25/2017 03:18 AM    Specific gravity 1.023 08/25/2017 03:18 AM    pH (UA) 5.5 08/25/2017 03:18 AM    Protein 30 08/25/2017 03:18 AM    Glucose 100 08/25/2017 03:18 AM    Ketone 15 08/25/2017 03:18 AM    Urobilinogen 1.0 08/25/2017 03:18 AM    Nitrites POSITIVE 08/25/2017 03:18 AM    Leukocyte Esterase SMALL 08/25/2017 03:18 AM    Epithelial cells FEW 08/25/2017 03:18 AM    Bacteria NEGATIVE  08/25/2017 03:18 AM    WBC 5-10 08/25/2017 03:18 AM    RBC 0-5 08/25/2017 03:18 AM         Medications Reviewed:     Current Facility-Administered Medications   Medication Dose Route Frequency    vancomycin (VANCOCIN) 1250 mg in  ml infusion  1,250 mg IntraVENous Q18H    ondansetron (ZOFRAN) injection 4 mg  4 mg IntraVENous Q4H PRN    prochlorperazine (COMPAZINE) injection 5 mg  5 mg IntraVENous Q6H PRN    aztreonam (AZACTAM) 2 g in 0.9% sodium chloride (MBP/ADV) 100 mL  2 g IntraVENous Q8H    albuterol-ipratropium (DUO-NEB) 2.5 MG-0.5 MG/3 ML  3 mL Nebulization Q4H PRN    docusate sodium (COLACE) capsule 100 mg  100 mg Oral BID    sodium chloride (NS) flush 5-10 mL  5-10 mL IntraVENous PRN    0.9% sodium chloride infusion  125 mL/hr IntraVENous CONTINUOUS    VAncomycin Pharmacy Dosing   Other Rx Dosing/Monitoring    PHENYLephrine (NEOSYNEPHRINE) 30,000 mcg in 0.9% sodium chloride 250 mL infusion   mcg/min IntraVENous TITRATE    NOREPINephrine (LEVOPHED) 8,000 mcg in dextrose 5% 250 mL infusion  2-30 mcg/min IntraVENous TITRATE    levoFLOXacin (LEVAQUIN) 750 mg in D5W IVPB  750 mg IntraVENous Q48H    sodium chloride (NS) flush 10-30 mL  10-30 mL InterCATHeter PRN  sodium chloride (NS) flush 10 mL  10 mL InterCATHeter Q24H    sodium chloride (NS) flush 10-40 mL  10-40 mL InterCATHeter Q8H    alteplase (CATHFLO) 1 mg in sterile water (preservative free) 1 mL injection  1 mg InterCATHeter PRN    bacitracin 500 unit/gram packet 1 Packet  1 Packet Topical PRN    acetaminophen (TYLENOL) tablet 650 mg  650 mg Oral Q6H PRN    gabapentin (NEURONTIN) capsule 900 mg  900 mg Oral BID    lactated Ringers infusion  125 mL/hr IntraVENous CONTINUOUS    lisinopril (PRINIVIL, ZESTRIL) tablet 40 mg  40 mg Oral DAILY    sodium chloride (NS) flush 5-10 mL  5-10 mL IntraVENous Q8H    sodium chloride (NS) flush 5-10 mL  5-10 mL IntraVENous PRN    metroNIDAZOLE (FLAGYL) IVPB premix 500 mg  500 mg IntraVENous Q8H    HYDROmorphone (PF) (DILAUDID) injection 1 mg  1 mg IntraVENous Q4H PRN    diphenhydrAMINE (BENADRYL) injection 12.5 mg  12.5 mg IntraVENous Q6H PRN    dexamethasone (DECADRON) 4 mg/mL injection 4 mg  4 mg IntraVENous Q6H PRN     ______________________________________________________________________  EXPECTED LENGTH OF STAY: 4d 21h  ACTUAL LENGTH OF STAY:          1                 Blu Beck MD

## 2017-08-26 NOTE — PROGRESS NOTES
1930 Bedside and Verbal shift change report given to Meet De La Cruz (oncoming nurse) by Zoe Rock (offgoing nurse). Report included the following information SBAR, Kardex, Intake/Output, Recent Results, Cardiac Rhythm NSR with PCA and Alarm Parameters . 0730 Bedside and Verbal shift change report given to Zoe Rock (oncoming nurse) by Meet De La Cruz (offgoing nurse). Report included the following information SBAR, Kardex, Intake/Output, Accordion, Cardiac Rhythm NSR and Alarm Parameters .

## 2017-08-27 LAB
ALBUMIN SERPL-MCNC: 2.1 G/DL (ref 3.5–5)
ALBUMIN/GLOB SERPL: 0.8 {RATIO} (ref 1.1–2.2)
ALP SERPL-CCNC: 252 U/L (ref 45–117)
ALT SERPL-CCNC: 143 U/L (ref 12–78)
ANION GAP SERPL CALC-SCNC: 8 MMOL/L (ref 5–15)
AST SERPL-CCNC: 86 U/L (ref 15–37)
BASOPHILS # BLD: 0 K/UL (ref 0–0.1)
BASOPHILS NFR BLD: 0 % (ref 0–1)
BILIRUB DIRECT SERPL-MCNC: 5.2 MG/DL (ref 0–0.2)
BILIRUB SERPL-MCNC: 6.2 MG/DL (ref 0.2–1)
BUN SERPL-MCNC: 41 MG/DL (ref 6–20)
BUN/CREAT SERPL: 20 (ref 12–20)
CALCIUM SERPL-MCNC: 7.7 MG/DL (ref 8.5–10.1)
CHLORIDE SERPL-SCNC: 105 MMOL/L (ref 97–108)
CO2 SERPL-SCNC: 20 MMOL/L (ref 21–32)
CREAT SERPL-MCNC: 2.02 MG/DL (ref 0.7–1.3)
DIFFERENTIAL METHOD BLD: ABNORMAL
EOSINOPHIL # BLD: 0.1 K/UL (ref 0–0.4)
EOSINOPHIL NFR BLD: 1 % (ref 0–7)
ERYTHROCYTE [DISTWIDTH] IN BLOOD BY AUTOMATED COUNT: 15.1 % (ref 11.5–14.5)
GLOBULIN SER CALC-MCNC: 2.5 G/DL (ref 2–4)
GLUCOSE SERPL-MCNC: 125 MG/DL (ref 65–100)
HCT VFR BLD AUTO: 33.9 % (ref 36.6–50.3)
HGB BLD-MCNC: 11.6 G/DL (ref 12.1–17)
LYMPHOCYTES # BLD: 0.7 K/UL (ref 0.8–3.5)
LYMPHOCYTES NFR BLD: 6 % (ref 12–49)
MAGNESIUM SERPL-MCNC: 2.1 MG/DL (ref 1.6–2.4)
MCH RBC QN AUTO: 30.5 PG (ref 26–34)
MCHC RBC AUTO-ENTMCNC: 34.2 G/DL (ref 30–36.5)
MCV RBC AUTO: 89.2 FL (ref 80–99)
MONOCYTES # BLD: 0.5 K/UL (ref 0–1)
MONOCYTES NFR BLD: 4 % (ref 5–13)
NEUTS BAND NFR BLD MANUAL: 4 % (ref 0–6)
NEUTS SEG # BLD: 10.7 K/UL (ref 1.8–8)
NEUTS SEG NFR BLD: 85 % (ref 32–75)
PHOSPHATE SERPL-MCNC: 2.4 MG/DL (ref 2.6–4.7)
PLATELET # BLD AUTO: 54 K/UL (ref 150–400)
POTASSIUM SERPL-SCNC: 3.7 MMOL/L (ref 3.5–5.1)
PROT SERPL-MCNC: 4.6 G/DL (ref 6.4–8.2)
RBC # BLD AUTO: 3.8 M/UL (ref 4.1–5.7)
RBC MORPH BLD: ABNORMAL
SODIUM SERPL-SCNC: 133 MMOL/L (ref 136–145)
WBC # BLD AUTO: 12 K/UL (ref 4.1–11.1)
WBC MORPH BLD: ABNORMAL

## 2017-08-27 PROCEDURE — 74011250636 HC RX REV CODE- 250/636: Performed by: FAMILY MEDICINE

## 2017-08-27 PROCEDURE — 80076 HEPATIC FUNCTION PANEL: CPT | Performed by: INTERNAL MEDICINE

## 2017-08-27 PROCEDURE — 77010033678 HC OXYGEN DAILY

## 2017-08-27 PROCEDURE — 83735 ASSAY OF MAGNESIUM: CPT | Performed by: INTERNAL MEDICINE

## 2017-08-27 PROCEDURE — 85025 COMPLETE CBC W/AUTO DIFF WBC: CPT | Performed by: INTERNAL MEDICINE

## 2017-08-27 PROCEDURE — 36415 COLL VENOUS BLD VENIPUNCTURE: CPT | Performed by: INTERNAL MEDICINE

## 2017-08-27 PROCEDURE — 74011000258 HC RX REV CODE- 258: Performed by: INTERNAL MEDICINE

## 2017-08-27 PROCEDURE — 65610000006 HC RM INTENSIVE CARE

## 2017-08-27 PROCEDURE — 80048 BASIC METABOLIC PNL TOTAL CA: CPT | Performed by: INTERNAL MEDICINE

## 2017-08-27 PROCEDURE — 74011000250 HC RX REV CODE- 250: Performed by: INTERNAL MEDICINE

## 2017-08-27 PROCEDURE — 74011250637 HC RX REV CODE- 250/637: Performed by: SPECIALIST

## 2017-08-27 PROCEDURE — 74011250636 HC RX REV CODE- 250/636: Performed by: SURGERY

## 2017-08-27 PROCEDURE — 84100 ASSAY OF PHOSPHORUS: CPT | Performed by: INTERNAL MEDICINE

## 2017-08-27 PROCEDURE — 74011250636 HC RX REV CODE- 250/636: Performed by: HOSPITALIST

## 2017-08-27 PROCEDURE — 74011250637 HC RX REV CODE- 250/637: Performed by: INTERNAL MEDICINE

## 2017-08-27 PROCEDURE — 74011000250 HC RX REV CODE- 250: Performed by: FAMILY MEDICINE

## 2017-08-27 RX ADMIN — METRONIDAZOLE 500 MG: 500 INJECTION, SOLUTION INTRAVENOUS at 06:03

## 2017-08-27 RX ADMIN — Medication 10 ML: at 06:03

## 2017-08-27 RX ADMIN — Medication 10 ML: at 23:41

## 2017-08-27 RX ADMIN — DOCUSATE SODIUM 100 MG: 100 CAPSULE, LIQUID FILLED ORAL at 08:21

## 2017-08-27 RX ADMIN — AZTREONAM 2 G: 2 INJECTION, POWDER, LYOPHILIZED, FOR SOLUTION INTRAMUSCULAR; INTRAVENOUS at 20:27

## 2017-08-27 RX ADMIN — DOCUSATE SODIUM 100 MG: 100 CAPSULE, LIQUID FILLED ORAL at 17:18

## 2017-08-27 RX ADMIN — AZTREONAM 2 G: 2 INJECTION, POWDER, LYOPHILIZED, FOR SOLUTION INTRAMUSCULAR; INTRAVENOUS at 06:06

## 2017-08-27 RX ADMIN — Medication 30 ML: at 15:07

## 2017-08-27 RX ADMIN — VANCOMYCIN HYDROCHLORIDE 1250 MG: 10 INJECTION, POWDER, LYOPHILIZED, FOR SOLUTION INTRAVENOUS at 15:06

## 2017-08-27 RX ADMIN — GABAPENTIN 900 MG: 300 CAPSULE ORAL at 17:18

## 2017-08-27 RX ADMIN — Medication 40 ML: at 12:44

## 2017-08-27 RX ADMIN — ONDANSETRON 4 MG: 2 INJECTION INTRAMUSCULAR; INTRAVENOUS at 00:41

## 2017-08-27 RX ADMIN — METRONIDAZOLE 500 MG: 500 INJECTION, SOLUTION INTRAVENOUS at 23:41

## 2017-08-27 RX ADMIN — METRONIDAZOLE 500 MG: 500 INJECTION, SOLUTION INTRAVENOUS at 00:42

## 2017-08-27 RX ADMIN — AZTREONAM 2 G: 2 INJECTION, POWDER, LYOPHILIZED, FOR SOLUTION INTRAMUSCULAR; INTRAVENOUS at 12:47

## 2017-08-27 RX ADMIN — METRONIDAZOLE 500 MG: 500 INJECTION, SOLUTION INTRAVENOUS at 15:06

## 2017-08-27 RX ADMIN — Medication 10 ML: at 15:07

## 2017-08-27 RX ADMIN — HYDROMORPHONE HYDROCHLORIDE 1 MG: 1 INJECTION, SOLUTION INTRAMUSCULAR; INTRAVENOUS; SUBCUTANEOUS at 00:42

## 2017-08-27 RX ADMIN — GABAPENTIN 900 MG: 300 CAPSULE ORAL at 08:20

## 2017-08-27 RX ADMIN — SODIUM CHLORIDE 125 ML/HR: 900 INJECTION, SOLUTION INTRAVENOUS at 00:43

## 2017-08-27 NOTE — PROGRESS NOTES
Progress Note    Patient: Cheryl Martinez MRN: [id number]  SSN: [id number]    YOB: 1955  Age: 62 y.o. Sex: male      Admit Date: 2017    3 Days Post-Op    Procedure:  Procedure(s):  ENDOSCOPIC RETROGRADE CHOLANGIOPANCREATOGRAPHY DIRECT VISUALIZATION  ENDOSCOPIC RETROGRADE CHOLANGIOPANCREATOGRAPHY  ENDOSCOPY WITH PROSTHESIS OR STENT REMOVAL  ESOPHAGOGASTRODUODENAL (EGD) BIOPSY  ENDOSCOPIC STONE EXTRACTION/BALLOON SWEEP  BILIARY STENT PLACEMENT    Subjective:     No acute surgical issues. Pt tolerating clears. Off pressor. No nausea or vomiting. WBC and LFT trending down.       Objective:     Visit Vitals    /69    Pulse 93    Temp 96.8 °F (36 °C)    Resp 19    Ht 5' 8.5\" (1.74 m)    Wt 216 lb 0.8 oz (98 kg)    SpO2 98%    BMI 32.37 kg/m2       Temp (24hrs), Av.2 °F (36.2 °C), Min:96.8 °F (36 °C), Max:97.7 °F (36.5 °C)        Physical Exam:    Gen:  NAD  Pulm:  Unlabored  Abd:  S/mildly distended/mild TTP in epigastric area    Recent Results (from the past 24 hour(s))   MAGNESIUM    Collection Time: 17  5:38 AM   Result Value Ref Range    Magnesium 2.1 1.6 - 2.4 mg/dL   METABOLIC PANEL, BASIC    Collection Time: 17  5:38 AM   Result Value Ref Range    Sodium 133 (L) 136 - 145 mmol/L    Potassium 3.7 3.5 - 5.1 mmol/L    Chloride 105 97 - 108 mmol/L    CO2 20 (L) 21 - 32 mmol/L    Anion gap 8 5 - 15 mmol/L    Glucose 125 (H) 65 - 100 mg/dL    BUN 41 (H) 6 - 20 MG/DL    Creatinine 2.02 (H) 0.70 - 1.30 MG/DL    BUN/Creatinine ratio 20 12 - 20      GFR est AA 41 (L) >60 ml/min/1.73m2    GFR est non-AA 34 (L) >60 ml/min/1.73m2    Calcium 7.7 (L) 8.5 - 10.1 MG/DL   PHOSPHORUS    Collection Time: 17  5:38 AM   Result Value Ref Range    Phosphorus 2.4 (L) 2.6 - 4.7 MG/DL   CBC WITH AUTOMATED DIFF    Collection Time: 17  5:38 AM   Result Value Ref Range    WBC 12.0 (H) 4.1 - 11.1 K/uL    RBC 3.80 (L) 4.10 - 5.70 M/uL    HGB 11.6 (L) 12.1 - 17.0 g/dL    HCT 33.9 (L) 36.6 - 50.3 %    MCV 89.2 80.0 - 99.0 FL    MCH 30.5 26.0 - 34.0 PG    MCHC 34.2 30.0 - 36.5 g/dL    RDW 15.1 (H) 11.5 - 14.5 %    PLATELET 54 (L) 558 - 400 K/uL    NEUTROPHILS 85 (H) 32 - 75 %    BAND NEUTROPHILS 4 0 - 6 %    LYMPHOCYTES 6 (L) 12 - 49 %    MONOCYTES 4 (L) 5 - 13 %    EOSINOPHILS 1 0 - 7 %    BASOPHILS 0 0 - 1 %    ABS. NEUTROPHILS 10.7 (H) 1.8 - 8.0 K/UL    ABS. LYMPHOCYTES 0.7 (L) 0.8 - 3.5 K/UL    ABS. MONOCYTES 0.5 0.0 - 1.0 K/UL    ABS. EOSINOPHILS 0.1 0.0 - 0.4 K/UL    ABS. BASOPHILS 0.0 0.0 - 0.1 K/UL    DF MANUAL      RBC COMMENTS ANISOCYTOSIS  1+        WBC COMMENTS DOHLE BODIES     HEPATIC FUNCTION PANEL    Collection Time: 08/27/17  5:38 AM   Result Value Ref Range    Protein, total 4.6 (L) 6.4 - 8.2 g/dL    Albumin 2.1 (L) 3.5 - 5.0 g/dL    Globulin 2.5 2.0 - 4.0 g/dL    A-G Ratio 0.8 (L) 1.1 - 2.2      Bilirubin, total 6.2 (H) 0.2 - 1.0 MG/DL    Bilirubin, direct 5.2 (H) 0.0 - 0.2 MG/DL    Alk.  phosphatase 252 (H) 45 - 117 U/L    AST (SGOT) 86 (H) 15 - 37 U/L    ALT (SGPT) 143 (H) 12 - 78 U/L               Assessment:     Hospital Problems  Date Reviewed: 8/24/2017          Codes Class Noted POA    Sepsis ([facility] ICD-10-CM: A41.9  ICD-9-CM: 038.9, 995.91  8/25/2017 Unknown              Plan/Recommendations/Medical Decision Making:     - Biliary stricture:  Improving post-ERCP  - Continue antibiotic  - Labs in am  - Full liquids as tolerated    Signed By: Tomasa Viveros MD     August 27, 2017

## 2017-08-27 NOTE — PROGRESS NOTES
ID Progress Note  2017    Subjective:     Better night--able to eat some this am    Objective:     Antibiotics:  1. Levaquin  2. Vancomycin   3. Azactam    4. Flagyl     Vitals:   Visit Vitals    /69    Pulse 93    Temp 96.8 °F (36 °C)    Resp 19    Ht 5' 8.5\" (1.74 m)    Wt 98 kg (216 lb 0.8 oz)    SpO2 98%    BMI 32.37 kg/m2        Tmax:  Temp (24hrs), Av.5 °F (36.4 °C), Min:96.8 °F (36 °C), Max:98.4 °F (36.9 °C)      Exam:  Lungs:  clear to auscultation bilaterally  Heart:  regular rate and rhythm  Abdomen:  abnormal findings:  distended  Skin:  no rash or abnormalities    Labs:      Recent Labs      17   0538  17   0509  17   0418  17   0013   WBC  12.0*  19.2*  18.4*  3.7*   HGB  11.6*  12.7  13.0  11.9*   PLT  54*  60*  74*  51*   BUN  41*  36*  30*  26*  25*   CREA  2.02*  2.39*  2.92*  2.51*  2.29*   SGOT  86*  188*  125*  133*  115*   AP  252*  163*  149*  179*  158*   TBILI  6.2*  7.9*  8.6*  7.9*  7.1*       Cultures:     No results found for: [facility]  Lab Results   Component Value Date/Time    Culture result: NO GROWTH 2 DAYS 2017 12:13 AM    Culture result: NO GROWTH 2 DAYS 2017 06:39 PM       Radiology:     Line/Insert Date:           Assessment:     1. Biliary obstruction with stenting  2. ?cholangitis  3. Leukocytosis--improved    Objective:     1. Continue current therapy  2.  Follow up cultures    Rianna Piña MD

## 2017-08-27 NOTE — PROGRESS NOTES
1930 Bedside and Verbal shift change report given to Glory Zazueta (oncoming nurse) by Kenneth Durham (offgoing nurse). Report included the following information SBAR, Intake/Output, Recent Results, Cardiac Rhythm NSR and Alarm Parameters . 0730 Bedside and Verbal shift change report given to Kenneth Durham (oncoming nurse) by Glory Zazueta (offgoing nurse). Report included the following information SBAR, Kardex, ED Summary, Intake/Output, Recent Results and Cardiac Rhythm NSR. Summary: pt had uneventful evening.

## 2017-08-27 NOTE — PROGRESS NOTES
2026 [facility] with Dr. Tania Squires, telephone order received to change fluids to [facility] (25ml/h) as patient is now eating and drinking. 1930- Shift Summary - Patient walked around the unit twice today. Patient now on room air, levophed off.

## 2017-08-27 NOTE — PROGRESS NOTES
Hospitalist Progress Note  Aaron Mendez MD  Office: [phone]  Cell:       Date of Service:  2017  NAME:  Penelope Mak  :  1955  MRN:  [id number]      Admission Summary:     Patient was admitted to the intensive care unit for management of sepsis. Patient has obstructive jaundice and biliary stricture. He underwent ERCP and had his biliary stent replaced yesterday. This procedure has had fevers and hypotension. Central line is in place he has been on broad antibiotics with aztreonam, Flagyl  levaquin and vancomycin.       Interval history / Subjective:     Patient feeling better, less abdominal pain, starting clear liquid and tolerating it. Assessment & Plan:     1. Sepsis with septic shock  Sepsis secondary to possible cholangitis, post ERCP. Improving clinically. Off pressor today. ID following and currently on vanc, flagyl, aztreonem and levaquin. Follow culture. 2. Abnormal LFT  Status post ERCP and stone extraction, as well as sweep biopsy. Improving bilirubin. GI following, patient tolerating the diet. Also general surgery on board, continue conservative management. 3. Leukocytosis   Improving, continue current management. 4. Acute renal failure  Also improving, continue current management. 5. Metabolic acidosis  Secondary to sepsis and renal failure. Improving. Code status: full  DVT prophylaxis: On heparin    Care Plan discussed with: Patient/Family  Disposition: TBD     Hospital Problems  Date Reviewed: 2017          Codes Class Noted POA    Sepsis [facility]) ICD-10-CM: A41.9  ICD-9-CM: 038.9, 995.91  2017 Unknown                Review of Systems:   Pertinent items are noted in HPI. Vital Signs:    Last 24hrs VS reviewed since prior progress note.  Most recent are:  Visit Vitals    /74    Pulse 85    Temp 97.2 °F (36.2 °C)    Resp 15    Ht 5' 8.5\" (1.74 m)    Wt 98 kg (216 lb 0.8 oz)    SpO2 99%    BMI 32.37 kg/m2         Intake/Output Summary (Last 24 hours) at 08/27/17 1652  Last data filed at 08/27/17 1400   Gross per 24 hour   Intake          3342.16 ml   Output             2375 ml   Net           967.16 ml        Physical Examination:             Constitutional:  No acute distress, cooperative, pleasant    ENT:  Oral mucous moist, oropharynx benign. Neck supple,    Resp:  CTA bilaterally. No wheezing/rhonchi/rales. No accessory muscle use   CV:  Regular rhythm, normal rate, no murmurs, gallops, rubs    GI:  Soft, non distended, non tender. normoactive bowel sounds, no hepatosplenomegaly     Musculoskeletal:  No edema, warm, 2+ pulses throughout    Neurologic:  Moves all extremities. AAOx3, CN II-XII reviewed     Skin:  Good turgor, no rashes or ulcers       Data Review:    Review and/or order of clinical lab test      Labs:     Recent Labs      08/27/17 0538 08/26/17   0509   WBC  12.0*  19.2*   HGB  11.6*  12.7   HCT  33.9*  37.5   PLT  54*  60*     Recent Labs      08/27/17   0538  08/26/17   0509  08/25/17 0418  08/25/17   0013   NA  133*  133*  136  135*  139   K  3.7  3.9  3.2*  3.4*  2.9*   CL  105  103  105  104  107   CO2  20*  16*  17*  18*  18*   BUN  41*  36*  30*  26*  25*   CREA  2.02*  2.39*  2.92*  2.51*  2.29*   GLU  125*  125*  174*  147*  134*   CA  7.7*  7.3*  6.9*  7.0*  6.6*   MG  2.1  2.0  1.4*   --    PHOS  2.4*  3.4   --   1.6*     Recent Labs      08/27/17   0538  08/26/17   0509  08/25/17   0418  08/25/17   0013   SGOT  86*  188*  125*  133*  115*   ALT  143*  206*  239*  243*  215*   AP  252*  163*  149*  179*  158*   TBILI  6.2*  7.9*  8.6*  7.9*  7.1*   TP  4.6*  4.9*  4.5*  4.4*  3.9*   ALB  2.1*  2.4*  2.3*  2.2*  2.0*   GLOB  2.5  2.5  2.2  2.2  1.9*   AML   --   25   --    --    LPSE   --   64*   --   271     No results for input(s): INR, PTP, APTT in the last 72 hours.     No lab exists for component: INREXT   No results for input(s): FE, TIBC, PSAT, FERR in the last 72 hours. No results found for: FOL, RBCF   No results for input(s): PH, PCO2, PO2 in the last 72 hours.   Recent Labs      08/25/17   0015   TROIQ  <0.04     Lab Results   Component Value Date/Time    Cholesterol, total 53 08/25/2017 12:13 AM    HDL Cholesterol 13 08/25/2017 12:13 AM    LDL, calculated 17.8 08/25/2017 12:13 AM    Triglyceride 111 08/25/2017 12:13 AM    CHOL/HDL Ratio 4.1 08/25/2017 12:13 AM     No results found for: GLUCPOC  Lab Results   Component Value Date/Time    Color ORANGE 08/25/2017 03:18 AM    Appearance TURBID 08/25/2017 03:18 AM    Specific gravity 1.023 08/25/2017 03:18 AM    pH (UA) 5.5 08/25/2017 03:18 AM    Protein 30 08/25/2017 03:18 AM    Glucose 100 08/25/2017 03:18 AM    Ketone 15 08/25/2017 03:18 AM    Urobilinogen 1.0 08/25/2017 03:18 AM    Nitrites POSITIVE 08/25/2017 03:18 AM    Leukocyte Esterase SMALL 08/25/2017 03:18 AM    Epithelial cells FEW 08/25/2017 03:18 AM    Bacteria NEGATIVE  08/25/2017 03:18 AM    WBC 5-10 08/25/2017 03:18 AM    RBC 0-5 08/25/2017 03:18 AM         Medications Reviewed:     Current Facility-Administered Medications   Medication Dose Route Frequency    [START ON 8/28/2017] vancomycin trough 8/28 @ 9:00 am    Other ONCE    vancomycin (VANCOCIN) 1250 mg in  ml infusion  1,250 mg IntraVENous Q18H    ondansetron (ZOFRAN) injection 4 mg  4 mg IntraVENous Q4H PRN    prochlorperazine (COMPAZINE) injection 5 mg  5 mg IntraVENous Q6H PRN    aztreonam (AZACTAM) 2 g in 0.9% sodium chloride (MBP/ADV) 100 mL  2 g IntraVENous Q8H    albuterol-ipratropium (DUO-NEB) 2.5 MG-0.5 MG/3 ML  3 mL Nebulization Q4H PRN    docusate sodium (COLACE) capsule 100 mg  100 mg Oral BID    sodium chloride (NS) flush 5-10 mL  5-10 mL IntraVENous PRN    0.9% sodium chloride infusion  25 mL/hr IntraVENous CONTINUOUS    VAncomycin Pharmacy Dosing   Other Rx Dosing/Monitoring    PHENYLephrine (NEOSYNEPHRINE) 30,000 mcg in 0.9% sodium chloride 250 mL infusion   mcg/min IntraVENous TITRATE    NOREPINephrine (LEVOPHED) 8,000 mcg in dextrose 5% 250 mL infusion  2-30 mcg/min IntraVENous TITRATE    levoFLOXacin (LEVAQUIN) 750 mg in D5W IVPB  750 mg IntraVENous Q48H    sodium chloride (NS) flush 10-30 mL  10-30 mL InterCATHeter PRN    sodium chloride (NS) flush 10 mL  10 mL InterCATHeter Q24H    sodium chloride (NS) flush 10-40 mL  10-40 mL InterCATHeter Q8H    alteplase (CATHFLO) 1 mg in sterile water (preservative free) 1 mL injection  1 mg InterCATHeter PRN    bacitracin 500 unit/gram packet 1 Packet  1 Packet Topical PRN    acetaminophen (TYLENOL) tablet 650 mg  650 mg Oral Q6H PRN    gabapentin (NEURONTIN) capsule 900 mg  900 mg Oral BID    lactated Ringers infusion  125 mL/hr IntraVENous CONTINUOUS    lisinopril (PRINIVIL, ZESTRIL) tablet 40 mg  40 mg Oral DAILY    sodium chloride (NS) flush 5-10 mL  5-10 mL IntraVENous Q8H    sodium chloride (NS) flush 5-10 mL  5-10 mL IntraVENous PRN    metroNIDAZOLE (FLAGYL) IVPB premix 500 mg  500 mg IntraVENous Q8H    HYDROmorphone (PF) (DILAUDID) injection 1 mg  1 mg IntraVENous Q4H PRN    diphenhydrAMINE (BENADRYL) injection 12.5 mg  12.5 mg IntraVENous Q6H PRN    dexamethasone (DECADRON) 4 mg/mL injection 4 mg  4 mg IntraVENous Q6H PRN     ______________________________________________________________________  EXPECTED LENGTH OF STAY: 4d 21h  ACTUAL LENGTH OF STAY:          2                 Aaron Mendez MD

## 2017-08-27 NOTE — PROGRESS NOTES
[address]    GI PROGRESS NOTE        NAME:   Radha Thompson   :    1955   MRN:    [id number]     Assessment/Plan   CBD stricture- with obstructive jaundice and septic shock s/p ERCP with Spyglass and metal stent placement. He developed severe abdominal pain, fever and hypotension post-procedure. WBC and LFTs elevated. Concern raised for gallbladder/cystic duct obstruction from stent. If he clinically decompensates further, would advise IR consultation for percutaneous cholecystostomy tube placement.  - appear to be improving clinically  - Supportive management per ICU team   - Continue antibiotics  - Follow cultures - NGTD  - Trend labs - bilirubin trending down, WBC trending down  - Await pathology results  - Clear liquid diet trial  - Dr. Isra Pearson to follow Monday     Patient Active Problem List   Diagnosis Code    Obstructive jaundice K83.8    Common bile duct (CBD) obstruction K83.1    UTI (urinary tract infection) N39.0    Sepsis ([address] A41.9       Subjective:     Per previous notes:He was initially seen at the end of July for obstructive jaundice. CT scan revealed intrahepatic and CBD dilatation with narrowing in mid CBD. ERCP demonstrated CBD stricture for which stent was placed. Brushings did not show any evidence of malignancy. CA 19-9 was not elevated. Subsequent EUS showed stent with possible sludge/debris in it. No mass was seen. ERCP with spyglass was then recommended. This was completed yesterday which demonstrated CBD stricture with irregular, friable and abnormal mucosa. Multiple biopsies were obtained and metal stent was placed. Post-procedure he had severe abdominal pain, fever and hypotension. Work-up was completed, he was started on pressors and transferred to the ICU for further monitoring. WBC elevated. LFTs remain elevated. CT scan demonstrated stent placement and pneumobilia, but was otherwise unremarkable. He is feeling michael this morning.  He reports improvement in his abdominal pain. Denies any nausea or vomiting. Afebrile overnight. BP remains low.     8/27/17: on levophed 1, afebrile. Bilirubin is trending down minimally (8.6 to 7.9 to 6.2). On broad spectrum antibiotics. WBC down to 12. Blood cx show NGTD. Nausea with vomiting resolved. Had bowel movement overnight. Review of Systems    Constitutional: positive fever, negative chills, negative weight loss  Eyes:   negative visual changes  ENT:   negative sore throat, tongue or lip swelling  Respiratory:  negative cough, negative dyspnea  Cards:  negative for chest pain, palpitations, lower extremity edema  GI:   See HPI  :  negative for frequency, dysuria  Integument:  negative for rash and pruritus  Heme:  negative for easy bruising and gum/nose bleeding  Musculoskel: negative for myalgias, back pain and muscle weakness  Neuro: negative for headaches, dizziness, vertigo  Psych:  negative for feelings of anxiety, depression     Objective:     VITALS:   Last 24hrs VS reviewed since prior hospitalist progress note. Most recent are:  Visit Vitals    /69    Pulse 93    Temp 96.8 °F (36 °C)    Resp 19    Ht 5' 8.5\" (1.74 m)    Wt 98 kg (216 lb 0.8 oz)    SpO2 98%    BMI 32.37 kg/m2       Intake/Output Summary (Last 24 hours) at 08/27/17 6619  Last data filed at 08/27/17 0900   Gross per 24 hour   Intake          3641.72 ml   Output             2975 ml   Net           666.72 ml        PHYSICAL EXAM:  General   well developed, alert, jaundiced, in no acute distress  EENT  Normocephalic, Atraumatic, scleral icterus    Abdominal  Soft, non-distended, mild RUQ tenderness, hypoactive bowel sounds, no hepatosplenomegaly, no palpable mass    Psychological  Oriented x 3. Normal affect.        Lab Data   Recent Results (from the past 12 hour(s))   MAGNESIUM    Collection Time: 08/27/17  5:38 AM   Result Value Ref Range    Magnesium 2.1 1.6 - 2.4 mg/dL   METABOLIC PANEL, BASIC    Collection Time: 08/27/17  5:38 AM   Result Value Ref Range    Sodium 133 (L) 136 - 145 mmol/L    Potassium 3.7 3.5 - 5.1 mmol/L    Chloride 105 97 - 108 mmol/L    CO2 20 (L) 21 - 32 mmol/L    Anion gap 8 5 - 15 mmol/L    Glucose 125 (H) 65 - 100 mg/dL    BUN 41 (H) 6 - 20 MG/DL    Creatinine 2.02 (H) 0.70 - 1.30 MG/DL    BUN/Creatinine ratio 20 12 - 20      GFR est AA 41 (L) >60 ml/min/1.73m2    GFR est non-AA 34 (L) >60 ml/min/1.73m2    Calcium 7.7 (L) 8.5 - 10.1 MG/DL   PHOSPHORUS    Collection Time: 08/27/17  5:38 AM   Result Value Ref Range    Phosphorus 2.4 (L) 2.6 - 4.7 MG/DL   CBC WITH AUTOMATED DIFF    Collection Time: 08/27/17  5:38 AM   Result Value Ref Range    WBC 12.0 (H) 4.1 - 11.1 K/uL    RBC 3.80 (L) 4.10 - 5.70 M/uL    HGB 11.6 (L) 12.1 - 17.0 g/dL    HCT 33.9 (L) 36.6 - 50.3 %    MCV 89.2 80.0 - 99.0 FL    MCH 30.5 26.0 - 34.0 PG    MCHC 34.2 30.0 - 36.5 g/dL    RDW 15.1 (H) 11.5 - 14.5 %    PLATELET 54 (L) 871 - 400 K/uL    NEUTROPHILS 85 (H) 32 - 75 %    BAND NEUTROPHILS 4 0 - 6 %    LYMPHOCYTES 6 (L) 12 - 49 %    MONOCYTES 4 (L) 5 - 13 %    EOSINOPHILS 1 0 - 7 %    BASOPHILS 0 0 - 1 %    ABS. NEUTROPHILS 10.7 (H) 1.8 - 8.0 K/UL    ABS. LYMPHOCYTES 0.7 (L) 0.8 - 3.5 K/UL    ABS. MONOCYTES 0.5 0.0 - 1.0 K/UL    ABS. EOSINOPHILS 0.1 0.0 - 0.4 K/UL    ABS. BASOPHILS 0.0 0.0 - 0.1 K/UL    DF MANUAL      RBC COMMENTS ANISOCYTOSIS  1+        WBC COMMENTS DOHLE BODIES     HEPATIC FUNCTION PANEL    Collection Time: 08/27/17  5:38 AM   Result Value Ref Range    Protein, total 4.6 (L) 6.4 - 8.2 g/dL    Albumin 2.1 (L) 3.5 - 5.0 g/dL    Globulin 2.5 2.0 - 4.0 g/dL    A-G Ratio 0.8 (L) 1.1 - 2.2      Bilirubin, total 6.2 (H) 0.2 - 1.0 MG/DL    Bilirubin, direct 5.2 (H) 0.0 - 0.2 MG/DL    Alk. phosphatase 252 (H) 45 - 117 U/L    AST (SGOT) 86 (H) 15 - 37 U/L    ALT (SGPT) 143 (H) 12 - 78 U/L         Medications: Reviewed    PMH/ reviewed - no change compared to H&P  Date/Time:  8/27/2017        Samuel Yoo, MD negative

## 2017-08-27 NOTE — PROGRESS NOTES
Problem: Falls - Risk of  Goal: *Absence of Falls  Document Rosalia Fall Risk and appropriate interventions in the flowsheet.    Outcome: Progressing Towards Goal  Fall Risk Interventions:  Mobility Interventions: Patient to call before getting OOB           Medication Interventions: Assess postural VS orthostatic hypotension, Bed/chair exit alarm, Evaluate medications/consider consulting pharmacy, Patient to call before getting OOB, Teach patient to arise slowly, Utilize gait belt for transfers/ambulation     Elimination Interventions: Call light in reach, Patient to call for help with toileting needs, Toilet paper/wipes in reach, Toileting schedule/hourly rounds, Urinal in reach

## 2017-08-28 LAB
ALBUMIN SERPL-MCNC: 2.1 G/DL (ref 3.5–5)
ALBUMIN/GLOB SERPL: 0.8 {RATIO} (ref 1.1–2.2)
ALP SERPL-CCNC: 280 U/L (ref 45–117)
ALT SERPL-CCNC: 107 U/L (ref 12–78)
ANION GAP SERPL CALC-SCNC: 9 MMOL/L (ref 5–15)
AST SERPL-CCNC: 29 U/L (ref 15–37)
BASOPHILS # BLD: 0 K/UL (ref 0–0.1)
BASOPHILS NFR BLD: 0 % (ref 0–1)
BILIRUB SERPL-MCNC: 3.8 MG/DL (ref 0.2–1)
BUN SERPL-MCNC: 37 MG/DL (ref 6–20)
BUN/CREAT SERPL: 21 (ref 12–20)
CALCIUM SERPL-MCNC: 8.1 MG/DL (ref 8.5–10.1)
CHLORIDE SERPL-SCNC: 109 MMOL/L (ref 97–108)
CO2 SERPL-SCNC: 21 MMOL/L (ref 21–32)
CREAT SERPL-MCNC: 1.79 MG/DL (ref 0.7–1.3)
DATE LAST DOSE: ABNORMAL
DIFFERENTIAL METHOD BLD: ABNORMAL
EOSINOPHIL # BLD: 0.3 K/UL (ref 0–0.4)
EOSINOPHIL NFR BLD: 3 % (ref 0–7)
ERYTHROCYTE [DISTWIDTH] IN BLOOD BY AUTOMATED COUNT: 15 % (ref 11.5–14.5)
GLOBULIN SER CALC-MCNC: 2.6 G/DL (ref 2–4)
GLUCOSE SERPL-MCNC: 151 MG/DL (ref 65–100)
HCT VFR BLD AUTO: 34.7 % (ref 36.6–50.3)
HGB BLD-MCNC: 11.8 G/DL (ref 12.1–17)
LYMPHOCYTES # BLD: 0.7 K/UL (ref 0.8–3.5)
LYMPHOCYTES NFR BLD: 6 % (ref 12–49)
MCH RBC QN AUTO: 29.8 PG (ref 26–34)
MCHC RBC AUTO-ENTMCNC: 34 G/DL (ref 30–36.5)
MCV RBC AUTO: 87.6 FL (ref 80–99)
METAMYELOCYTES NFR BLD MANUAL: 1 %
MONOCYTES # BLD: 0.8 K/UL (ref 0–1)
MONOCYTES NFR BLD: 7 % (ref 5–13)
MYELOCYTES NFR BLD MANUAL: 1 %
NEUTS BAND NFR BLD MANUAL: 1 %
NEUTS SEG # BLD: 9.2 K/UL (ref 1.8–8)
NEUTS SEG NFR BLD: 81 % (ref 32–75)
PLATELET # BLD AUTO: 61 K/UL (ref 150–400)
POTASSIUM SERPL-SCNC: 3.4 MMOL/L (ref 3.5–5.1)
PROT SERPL-MCNC: 4.7 G/DL (ref 6.4–8.2)
RBC # BLD AUTO: 3.96 M/UL (ref 4.1–5.7)
RBC MORPH BLD: ABNORMAL
REPORTED DOSE,DOSE: ABNORMAL UNITS
REPORTED DOSE/TIME,TMG: ABNORMAL
SODIUM SERPL-SCNC: 139 MMOL/L (ref 136–145)
VANCOMYCIN TROUGH SERPL-MCNC: 10.9 UG/ML (ref 5–10)
WBC # BLD AUTO: 11.2 K/UL (ref 4.1–11.1)
WBC MORPH BLD: ABNORMAL

## 2017-08-28 PROCEDURE — 74011250637 HC RX REV CODE- 250/637: Performed by: SPECIALIST

## 2017-08-28 PROCEDURE — 65270000032 HC RM SEMIPRIVATE

## 2017-08-28 PROCEDURE — 80202 ASSAY OF VANCOMYCIN: CPT | Performed by: INTERNAL MEDICINE

## 2017-08-28 PROCEDURE — 74011000258 HC RX REV CODE- 258: Performed by: INTERNAL MEDICINE

## 2017-08-28 PROCEDURE — 74011000250 HC RX REV CODE- 250: Performed by: INTERNAL MEDICINE

## 2017-08-28 PROCEDURE — 85025 COMPLETE CBC W/AUTO DIFF WBC: CPT | Performed by: FAMILY MEDICINE

## 2017-08-28 PROCEDURE — 74011000250 HC RX REV CODE- 250: Performed by: FAMILY MEDICINE

## 2017-08-28 PROCEDURE — 80053 COMPREHEN METABOLIC PANEL: CPT | Performed by: FAMILY MEDICINE

## 2017-08-28 PROCEDURE — 77030018836 HC SOL IRR NACL ICUM -A

## 2017-08-28 PROCEDURE — 36415 COLL VENOUS BLD VENIPUNCTURE: CPT | Performed by: FAMILY MEDICINE

## 2017-08-28 PROCEDURE — 77010033678 HC OXYGEN DAILY

## 2017-08-28 PROCEDURE — 74011250636 HC RX REV CODE- 250/636: Performed by: HOSPITALIST

## 2017-08-28 PROCEDURE — 74011250636 HC RX REV CODE- 250/636: Performed by: INTERNAL MEDICINE

## 2017-08-28 RX ORDER — LEVOFLOXACIN 5 MG/ML
750 INJECTION, SOLUTION INTRAVENOUS EVERY 24 HOURS
Status: DISCONTINUED | OUTPATIENT
Start: 2017-08-28 | End: 2017-08-31 | Stop reason: HOSPADM

## 2017-08-28 RX ADMIN — Medication 10 ML: at 13:04

## 2017-08-28 RX ADMIN — GABAPENTIN 900 MG: 300 CAPSULE ORAL at 17:59

## 2017-08-28 RX ADMIN — Medication 10 ML: at 17:59

## 2017-08-28 RX ADMIN — Medication 10 ML: at 06:25

## 2017-08-28 RX ADMIN — Medication 10 ML: at 21:36

## 2017-08-28 RX ADMIN — VANCOMYCIN HYDROCHLORIDE 1250 MG: 10 INJECTION, POWDER, LYOPHILIZED, FOR SOLUTION INTRAVENOUS at 09:02

## 2017-08-28 RX ADMIN — AZTREONAM 2 G: 2 INJECTION, POWDER, LYOPHILIZED, FOR SOLUTION INTRAMUSCULAR; INTRAVENOUS at 13:04

## 2017-08-28 RX ADMIN — LEVOFLOXACIN 750 MG: 5 INJECTION, SOLUTION INTRAVENOUS at 16:01

## 2017-08-28 RX ADMIN — AZTREONAM 2 G: 2 INJECTION, POWDER, LYOPHILIZED, FOR SOLUTION INTRAMUSCULAR; INTRAVENOUS at 21:37

## 2017-08-28 RX ADMIN — METRONIDAZOLE 500 MG: 500 INJECTION, SOLUTION INTRAVENOUS at 22:45

## 2017-08-28 RX ADMIN — METRONIDAZOLE 500 MG: 500 INJECTION, SOLUTION INTRAVENOUS at 14:11

## 2017-08-28 RX ADMIN — Medication 10 ML: at 06:26

## 2017-08-28 RX ADMIN — AZTREONAM 2 G: 2 INJECTION, POWDER, LYOPHILIZED, FOR SOLUTION INTRAMUSCULAR; INTRAVENOUS at 05:00

## 2017-08-28 RX ADMIN — GABAPENTIN 900 MG: 300 CAPSULE ORAL at 08:11

## 2017-08-28 RX ADMIN — METRONIDAZOLE 500 MG: 500 INJECTION, SOLUTION INTRAVENOUS at 06:25

## 2017-08-28 NOTE — PROGRESS NOTES
Intensivist    Patient was admitted to the intensive care unit for management of sepsis. Patient has obstructive jaundice and biliary stricture. He underwent ERCP and had his biliary stent replaced yesterday. This procedure has had fevers and hypotension. Central line is in place he has been on broad antibiotics with aztreonam Flagyl and vancomycin. He's also received IV fluids. He states he is feeling better today. His pain is down to a 5-10 in his abdomen. He says he feels hot but his temperature is down. Blood cultures have been sent and showed no growth at this time. 8/28  Feels better, off pressors, on RA    Allergies   Allergen Reactions    Pcn [Penicillins] Other (comments)     Pt stated \"always been told PCN\"     Past Medical History:   Diagnosis Date    Autoimmune disease ([facility]     Hypertension     Ill-defined condition     Previous a.fib, ablation, NSR now     Past Surgical History:   Procedure Laterality Date    CARDIAC SURG PROCEDURE UNLIST      Ablation 2005    HX ORTHOPAEDIC      Spinal fusion     NEUROLOGICAL PROCEDURE UNLISTED  2005    Lilia Maci hole washout from cerebral hemorrhage     Prior to Admission medications    Medication Sig Start Date End Date Taking? Authorizing Provider   gabapentin (NEURONTIN) 300 mg capsule Take 900 mg by mouth two (2) times a day. 3 x 300mg caps (900mg) twice daily   Yes Historical Provider   ramipril (ALTACE) 10 mg capsule Take 10 mg by mouth daily. Yes Flynn Mohan MD   cholecalciferol (VITAMIN D3) 1,000 unit cap Take 1,000 Units by mouth daily. Yes Flynn Mohan MD   cholestyramine-sucrose (QUESTRAN) 4 gram powder Take 4 g by mouth two (2) times a day. Take 1 hour before other medications or meals 7/28/17   Bernabe Coronado MD   cyanocobalamin (VITAMIN B-12) 1,000 mcg/mL injection 1,000 mcg by IntraMUSCular route once.  Indications: VITAMIN B12 DEFICIENCY, Twice a month    Phys MD Kimberlee     Social History     Social History    Marital status:  Spouse name: N/A    Number of children: N/A    Years of education: N/A     Occupational History    Not on file. Social History Main Topics    Smoking status: Never Smoker    Smokeless tobacco: Never Used    Alcohol use No    Drug use: Yes     Special: Prescription, OTC    Sexual activity: Not on file     Other Topics Concern    Not on file     Social History Narrative     Patient Vitals for the past 4 hrs:   Temp Pulse Resp BP SpO2   08/28/17 1200 97.6 °F (36.4 °C) 87 23 124/81 96 %   08/28/17 1000 - 93 22 126/77 96 %       Exam:  Alert in no acute distress  Right IJ catheter in place  Mildly icteric sclera  Mucous membranes slightly dry  Lungs are clear  Regular rate and rhythm  No guarding or rebound  Warm and dry    CXR-low volumes with basilar atelectasis    Lab:  Recent Labs      08/28/17   0404  08/27/17   0538  08/26/17   0509   WBC  11.2*  12.0*  19.2*   HGB  11.8*  11.6*  12.7   PLT  61*  54*  60*   NA  139  133*  133*   K  3.4*  3.7  3.9   CL  109*  105  103   CO2  21  20*  16*   BUN  37*  41*  36*   CREA  1.79*  2.02*  2.39*   GLU  151*  125*  125*   CA  8.1*  7.7*  7.3*   MG   --   2.1  2.0   PHOS   --   2.4*  3.4   TBILI  3.8*  6.2*  7.9*   SGOT  29  86*  188*     All Micro Results     Procedure Component Value Units Date/Time    CULTURE, BLOOD, PAIRED [[id number]] Collected:  08/25/17 0013    Order Status:  Completed Specimen:  Blood Updated:  08/28/17 0507     Special Requests: NO SPECIAL REQUESTS        Culture result: NO GROWTH 3 DAYS       CULTURE, BLOOD [[id number]] Collected:  08/25/17 0418    Order Status:  Canceled Specimen:  Blood from Blood     CULTURE, BLOOD [[id number]] Collected:  08/25/17 0418    Order Status:  Canceled Specimen:  Blood from Blood         Impression:    1. Septic shock-biliary following ERCP with biopsy of biliary stricture and placement of a biliary stent. Resolved  2. Acute kidney injury  3. Hyperglycemia  4.  Leukocytosis    --continue broad antibiotics  -- SCDs  -- Mobilize  -- Management per consulting teams  -- Stable for transfer out of ICU  -- We will be available to see him again if needed    Coverage to see this weekend if needed    Vanessa Tolbert MD

## 2017-08-28 NOTE — ROUTINE PROCESS
TRANSFER - OUT REPORT:    Verbal report given to Dorothy Dimas RN(name) on Cheryl Martinez  being transferred to [facility](unit) for routine progression of care       Report consisted of patients Situation, Background, Assessment and   Recommendations(SBAR). Information from the following report(s) SBAR, Kardex, Intake/Output, MAR and Recent Results was reviewed with the receiving nurse. Lines:   Peripheral IV 08/25/17 Right Forearm (Active)   Site Assessment Clean, dry, & intact 8/28/2017 12:00 PM   Phlebitis Assessment 0 8/28/2017 12:00 PM   Infiltration Assessment 0 8/28/2017 12:00 PM   Dressing Status Clean, dry, & intact 8/28/2017 12:00 PM   Dressing Type Tape;Transparent 8/28/2017 12:00 PM   Hub Color/Line Status Green;Capped;Flushed 8/28/2017 12:00 PM   Action Taken Open ports on tubing capped 8/28/2017 12:00 PM   Alcohol Cap Used Yes 8/28/2017 12:00 PM        Opportunity for questions and clarification was provided.       Patient transported with:   Registered Nurse  Tech

## 2017-08-28 NOTE — PROGRESS NOTES
Hospitalist Progress Note  Anna Tomlinson MD  Office: [phone]  Cell:       Date of Service:  2017  NAME:  Karina Hardin  :  1955  MRN:  [id number]      Admission Summary:     Patient was admitted to the intensive care unit for management of sepsis. Patient has obstructive jaundice and biliary stricture. He underwent ERCP and had his biliary stent replaced yesterday. This procedure has had fevers and hypotension. Central line is in place he has been on broad antibiotics with aztreonam, Flagyl  levaquin and vancomycin. Interval history / Subjective:       Patient is feeling better, diet advanced and patient tolerating. Abdominal pain improving. Assessment & Plan:     1. Sepsis with septic shock  Sepsis secondary to possible cholangitis, post ERCP. Improving clinically. Off pressor since . ID following and currently on vanc, flagyl, aztreonem and levaquin. Follow cultures.     2. Abnormal LFT  Status post ERCP and stone extraction, as well as sweep biopsy. Improving bilirubin. GI following, patient tolerating the diet. Also general surgery on board, continue conservative management. Tolerating advanced diet.     3. Leukocytosis   Improving, continue current management.     4. Acute renal failure  Secondary to sepsis and ATN from hypotension. Also improving serum creatinine.     5. Metabolic acidosis  Secondary to sepsis and renal failure. Improving. 6. Hypertension  Stable. Continue current meds. Code status: Full code  DVT prophylaxis: Heparin    Care Plan discussed with: Patient/Family  Disposition: TBD     Hospital Problems  Date Reviewed: 2017          Codes Class Noted POA    Sepsis [facility]) ICD-10-CM: A41.9  ICD-9-CM: 038.9, 995.91  2017 Unknown                Review of Systems:   Pertinent items are noted in HPI. Vital Signs:    Last 24hrs VS reviewed since prior progress note.  Most recent are:  Visit Vitals    /81 (BP 1 Location: Right arm, BP Patient Position: At rest)    Pulse 87    Temp 97.6 °F (36.4 °C)    Resp 23    Ht 5' 8.5\" (1.74 m)    Wt 101 kg (222 lb 10.6 oz)    SpO2 96%    BMI 33.36 kg/m2         Intake/Output Summary (Last 24 hours) at 08/28/17 1250  Last data filed at 08/28/17 1200   Gross per 24 hour   Intake             1415 ml   Output             3050 ml   Net            -1635 ml        Physical Examination:             Constitutional:  No acute distress, cooperative, pleasant    ENT:  Oral mucous moist, oropharynx benign. Neck supple,    Resp:  CTA bilaterally. No wheezing/rhonchi/rales. No accessory muscle use   CV:  Regular rhythm, normal rate, no murmurs, gallops, rubs    GI:  Soft, non distended, non tender. normoactive bowel sounds, no hepatosplenomegaly     Musculoskeletal:  No edema, warm, 2+ pulses throughout    Neurologic:  Moves all extremities. AAOx3, CN II-XII reviewed     Skin:  Good turgor, no rashes or ulcers       Data Review:    Review and/or order of clinical lab test      Labs:     Recent Labs      08/28/17   0404  08/27/17   0538   WBC  11.2*  12.0*   HGB  11.8*  11.6*   HCT  34.7*  33.9*   PLT  61*  54*     Recent Labs      08/28/17   0404  08/27/17   0538  08/26/17   0509   NA  139  133*  133*   K  3.4*  3.7  3.9   CL  109*  105  103   CO2  21  20*  16*   BUN  37*  41*  36*   CREA  1.79*  2.02*  2.39*   GLU  151*  125*  125*   CA  8.1*  7.7*  7.3*   MG   --   2.1  2.0   PHOS   --   2.4*  3.4     Recent Labs      08/28/17   0404  08/27/17   0538  08/26/17   0509   SGOT  29  86*  188*   ALT  107*  143*  206*   AP  280*  252*  163*   TBILI  3.8*  6.2*  7.9*   TP  4.7*  4.6*  4.9*   ALB  2.1*  2.1*  2.4*   GLOB  2.6  2.5  2.5   AML   --    --   25   LPSE   --    --   64*     No results for input(s): INR, PTP, APTT in the last 72 hours. No lab exists for component: INREXT   No results for input(s): FE, TIBC, PSAT, FERR in the last 72 hours.    No results found for: FOL, RBCF   No results for input(s): PH, PCO2, PO2 in the last 72 hours. No results for input(s): CPK, CKNDX, TROIQ in the last 72 hours.     No lab exists for component: CPKMB  Lab Results   Component Value Date/Time    Cholesterol, total 53 08/25/2017 12:13 AM    HDL Cholesterol 13 08/25/2017 12:13 AM    LDL, calculated 17.8 08/25/2017 12:13 AM    Triglyceride 111 08/25/2017 12:13 AM    CHOL/HDL Ratio 4.1 08/25/2017 12:13 AM     No results found for: GLUCPOC  Lab Results   Component Value Date/Time    Color ORANGE 08/25/2017 03:18 AM    Appearance TURBID 08/25/2017 03:18 AM    Specific gravity 1.023 08/25/2017 03:18 AM    pH (UA) 5.5 08/25/2017 03:18 AM    Protein 30 08/25/2017 03:18 AM    Glucose 100 08/25/2017 03:18 AM    Ketone 15 08/25/2017 03:18 AM    Urobilinogen 1.0 08/25/2017 03:18 AM    Nitrites POSITIVE 08/25/2017 03:18 AM    Leukocyte Esterase SMALL 08/25/2017 03:18 AM    Epithelial cells FEW 08/25/2017 03:18 AM    Bacteria NEGATIVE  08/25/2017 03:18 AM    WBC 5-10 08/25/2017 03:18 AM    RBC 0-5 08/25/2017 03:18 AM         Medications Reviewed:     Current Facility-Administered Medications   Medication Dose Route Frequency    vancomycin (VANCOCIN) 1250 mg in  ml infusion  1,250 mg IntraVENous Q18H    ondansetron (ZOFRAN) injection 4 mg  4 mg IntraVENous Q4H PRN    prochlorperazine (COMPAZINE) injection 5 mg  5 mg IntraVENous Q6H PRN    aztreonam (AZACTAM) 2 g in 0.9% sodium chloride (MBP/ADV) 100 mL  2 g IntraVENous Q8H    albuterol-ipratropium (DUO-NEB) 2.5 MG-0.5 MG/3 ML  3 mL Nebulization Q4H PRN    docusate sodium (COLACE) capsule 100 mg  100 mg Oral BID    sodium chloride (NS) flush 5-10 mL  5-10 mL IntraVENous PRN    VAncomycin Pharmacy Dosing   Other Rx Dosing/Monitoring    levoFLOXacin (LEVAQUIN) 750 mg in D5W IVPB  750 mg IntraVENous Q48H    sodium chloride (NS) flush 10-30 mL  10-30 mL InterCATHeter PRN    sodium chloride (NS) flush 10 mL  10 mL InterCATHeter Q24H    sodium chloride (NS) flush 10-40 mL  10-40 mL InterCATHeter Q8H    alteplase (CATHFLO) 1 mg in sterile water (preservative free) 1 mL injection  1 mg InterCATHeter PRN    bacitracin 500 unit/gram packet 1 Packet  1 Packet Topical PRN    acetaminophen (TYLENOL) tablet 650 mg  650 mg Oral Q6H PRN    gabapentin (NEURONTIN) capsule 900 mg  900 mg Oral BID    lisinopril (PRINIVIL, ZESTRIL) tablet 40 mg  40 mg Oral DAILY    sodium chloride (NS) flush 5-10 mL  5-10 mL IntraVENous Q8H    sodium chloride (NS) flush 5-10 mL  5-10 mL IntraVENous PRN    metroNIDAZOLE (FLAGYL) IVPB premix 500 mg  500 mg IntraVENous Q8H    HYDROmorphone (PF) (DILAUDID) injection 1 mg  1 mg IntraVENous Q4H PRN    diphenhydrAMINE (BENADRYL) injection 12.5 mg  12.5 mg IntraVENous Q6H PRN    dexamethasone (DECADRON) 4 mg/mL injection 4 mg  4 mg IntraVENous Q6H PRN     ______________________________________________________________________  EXPECTED LENGTH OF STAY: 4d 21h  ACTUAL LENGTH OF STAY:          3                 Lenora Novak MD

## 2017-08-28 NOTE — ROUTINE PROCESS
Bedside and Verbal shift change report given to K. Dance, RN (oncoming nurse) by Karin Kelly. Maris Saavedra RN (offgoing nurse). Report included the following information SBAR, Kardex, Procedure Summary, Intake/Output, MAR and Recent Results.

## 2017-08-28 NOTE — PROGRESS NOTES
ID Progress Note  2017    Subjective:     Doing fairly well--moving out of the ICU setting    Objective:     Antibiotics:  1. Levaquin  2. Vancomycin   3. Azactam    4. Flagyl     Vitals:   Visit Vitals    /81 (BP 1 Location: Right arm, BP Patient Position: At rest)    Pulse 87    Temp 97.6 °F (36.4 °C)    Resp 23    Ht 5' 8.5\" (1.74 m)    Wt 101 kg (222 lb 10.6 oz)    SpO2 96%    BMI 33.36 kg/m2        Tmax:  Temp (24hrs), Av.6 °F (36.4 °C), Min:97.3 °F (36.3 °C), Max:97.8 °F (36.6 °C)      Exam:  Lungs:  clear to auscultation bilaterally  Heart:  regular rate and rhythm  Abdomen:  abnormal findings:  distended  Skin:  no rash or abnormalities    Labs:      Recent Labs      17   0404  17   0538  17   0509   WBC  11.2*  12.0*  19.2*   HGB  11.8*  11.6*  12.7   PLT  61*  54*  60*   BUN  37*  41*  36*   CREA  1.79*  2.02*  2.39*   SGOT  29  86*  188*   AP  280*  252*  163*   TBILI  3.8*  6.2*  7.9*       Cultures:     No results found for: SDES  Lab Results   Component Value Date/Time    Culture result: NO GROWTH 3 DAYS 2017 12:13 AM    Culture result: NO GROWTH 2 DAYS 2017 06:39 PM       Radiology:     Line/Insert Date:           Assessment:     1. Biliary obstruction with stenting  2. ?cholangitis  3. Leukocytosis--improved    Objective:     1. Continue current therapy  2.  Follow up cultures    Caden Valle MD

## 2017-08-28 NOTE — PROGRESS NOTES
2000 Bedside and Verbal shift change report given to Bennie Laurent (oncoming nurse) by Lamonte Dominique (offgoing nurse). Report included the following information SBAR, Kardex, Intake/Output, Recent Results, Cardiac Rhythm NSR and Alarm Parameters .

## 2017-08-28 NOTE — PROGRESS NOTES
Day #4 of Vancomycin  Indication: Sepsis secondary to possible cholangitis, post ERCP. Leukocytosis improving    Current regimen:  1250 mg IV Q18H  Abx regimen:  vancomycin + aztreonam + levofloxacin + metronidazole     Recent Labs      17   0404  17   0538  17   0509   WBC  11.2*  12.0*  19.2*   CREA  1.79*  2.02*  2.39*   BUN  37*  41*  36*     Est CrCl: ~50 ml/min  Temp (24hrs), Av.6 °F (36.4 °C), Min:97.3 °F (36.3 °C), Max:97.8 °F (36.6 °C)    Cultures:    blood - NGTD     Goal trough = 10 - 15 mcg/mL    Trough(s)  -  = 6.7 mcg/mL (27 hour level) after 2000 mg x 1 [start 1250 mg Q18H]  -  @ 08:17 = 10.9 (extrapolated = 10.4)     Plan: Today's level is therapeutic. Will continue current regimen for now.

## 2017-08-28 NOTE — PROGRESS NOTES
[facility] General Surgery    Subjective     No acute events overnight. Afebrile. Feeling much better. Tolerating diet, no complaints of pain, having bowel function. Up to chair this morning.       Objective     Patient Vitals for the past 24 hrs:   Temp Pulse Resp BP SpO2   08/28/17 0900 - 99 29 110/71 97 %   08/28/17 0800 97.5 °F (36.4 °C) 84 20 110/78 97 %   08/28/17 0600 - 84 17 109/75 97 %   08/28/17 0500 - 89 25 116/75 100 %   08/28/17 0400 97.8 °F (36.6 °C) 80 24 117/79 97 %   08/28/17 0333 - - - - 99 %   08/28/17 0300 - 91 19 119/88 96 %   08/28/17 0200 - 81 17 110/73 99 %   08/28/17 0100 - 83 15 107/72 98 %   08/28/17 0000 - 88 20 120/82 98 %   08/27/17 2330 97.3 °F (36.3 °C) - - - -   08/27/17 2300 - 83 15 105/71 96 %   08/27/17 2200 - 86 19 111/80 97 %   08/27/17 2100 - 92 21 111/79 97 %   08/27/17 2000 97.6 °F (36.4 °C) 86 23 95/71 97 %   08/27/17 1900 - 85 15 101/73 100 %   08/27/17 1800 - 89 22 99/70 97 %   08/27/17 1700 - 87 23 98/80 100 %   08/27/17 1622 97.7 °F (36.5 °C) 86 24 102/79 97 %   08/27/17 1500 - 85 16 106/78 97 %   08/27/17 1445 - 85 15 105/74 99 %   08/27/17 1430 - 83 16 102/74 98 %   08/27/17 1415 - 85 18 103/83 97 %   08/27/17 1400 - 89 (!) 32 103/74 97 %   08/27/17 1345 - 85 20 100/71 98 %   08/27/17 1330 - 92 22 103/68 96 %   08/27/17 1315 - 97 26 104/88 97 %   08/27/17 1300 - 96 19 106/75 99 %   08/27/17 1245 - 89 21 - 100 %   08/27/17 1230 - 83 13 97/71 100 %   08/27/17 1215 - 80 18 94/67 99 %   08/27/17 1200 97.2 °F (36.2 °C) 81 16 114/85 98 %   08/27/17 1130 - 85 19 102/75 97 %   08/27/17 1115 - 82 13 105/71 97 %   08/27/17 1100 - 84 14 102/66 96 %   08/27/17 1045 - 85 20 106/74 96 %   08/27/17 1030 - 83 12 99/62 97 %   08/27/17 1015 - 83 13 99/63 97 %   08/27/17 1000 - 82 20 96/75 98 %   08/27/17 0945 - 82 17 91/72 97 %         Date 08/27/17 0700 - 08/28/17 0659 08/28/17 0700 - 08/29/17 0659   [facility] [id number] [id number] 70 Hour Total 6910-6981 5943-1529 24 Hour Total   I  N  T  A  K  E   P.O. 240  240         P. O. 240  240       I.V.  (mL/kg/hr) 2234.7  (1.9) 250  (0.2) 2484.7  (1)         Levophed Volume 9.7  9.7         Volume (0.9% sodium chloride infusion) 0578 961 4200         Volume (metroNIDAZOLE (FLAGYL) IVPB premix 500 mg) 100  100         Volume (vancomycin (VANCOCIN) 1250 mg in  ml infusion) 250  250         Volume (aztreonam (AZACTAM) 2 g in 0.9% sodium chloride (MBP/ADV) 100 mL) 400 0 400       Shift Total  (mL/kg) 2474.7  (25.3) 250  (2.5) 2724.7  (27)      O  U  T  P  U  T   Urine  (mL/kg/hr) 1100  (0.9) 1100  (0.9) 2200  (0.9) 350  350      Urine Voided 1100 1100 2200 350  350      Urine Occurrence(s)  1 x 1 x 1 x  1 x    Stool            Stool Occurrence(s) 1 x 2 x 3 x       Shift Total  (mL/kg) 1100  (11.2) 1100  (10.9) 2200  (21.8) 350  (3.5)  350  (3.5)   NET 1374.7 -850 524.7 -350  -350   Weight (kg) 98 101 101 101 101 101       PE  GEN - Awake, alert, communicating appropriately. NAD  Abd - soft, NT, ND  Ext - warm, well perfused. Labs  Recent Results (from the past 24 hour(s))   CBC WITH AUTOMATED DIFF    Collection Time: 08/28/17  4:04 AM   Result Value Ref Range    WBC 11.2 (H) 4.1 - 11.1 K/uL    RBC 3.96 (L) 4.10 - 5.70 M/uL    HGB 11.8 (L) 12.1 - 17.0 g/dL    HCT 34.7 (L) 36.6 - 50.3 %    MCV 87.6 80.0 - 99.0 FL    MCH 29.8 26.0 - 34.0 PG    MCHC 34.0 30.0 - 36.5 g/dL    RDW 15.0 (H) 11.5 - 14.5 %    PLATELET 61 (L) 751 - 400 K/uL    NEUTROPHILS 81 (H) 32 - 75 %    BAND NEUTROPHILS 1 %    LYMPHOCYTES 6 (L) 12 - 49 %    MONOCYTES 7 5 - 13 %    EOSINOPHILS 3 0 - 7 %    BASOPHILS 0 0 - 1 %    METAMYELOCYTES 1 %    MYELOCYTES 1 %    ABS. NEUTROPHILS 9.2 (H) 1.8 - 8.0 K/UL    ABS. LYMPHOCYTES 0.7 (L) 0.8 - 3.5 K/UL    ABS. MONOCYTES 0.8 0.0 - 1.0 K/UL    ABS. EOSINOPHILS 0.3 0.0 - 0.4 K/UL    ABS.  BASOPHILS 0.0 0.0 - 0.1 K/UL    DF MANUAL      RBC COMMENTS ANISOCYTOSIS  1+        WBC COMMENTS MIGUEL BODIES     METABOLIC PANEL, COMPREHENSIVE    Collection Time: 08/28/17  4:04 AM   Result Value Ref Range    Sodium 139 136 - 145 mmol/L    Potassium 3.4 (L) 3.5 - 5.1 mmol/L    Chloride 109 (H) 97 - 108 mmol/L    CO2 21 21 - 32 mmol/L    Anion gap 9 5 - 15 mmol/L    Glucose 151 (H) 65 - 100 mg/dL    BUN 37 (H) 6 - 20 MG/DL    Creatinine 1.79 (H) 0.70 - 1.30 MG/DL    BUN/Creatinine ratio 21 (H) 12 - 20      GFR est AA 47 (L) >60 ml/min/1.73m2    GFR est non-AA 39 (L) >60 ml/min/1.73m2    Calcium 8.1 (L) 8.5 - 10.1 MG/DL    Bilirubin, total 3.8 (H) 0.2 - 1.0 MG/DL    ALT (SGPT) 107 (H) 12 - 78 U/L    AST (SGOT) 29 15 - 37 U/L    Alk. phosphatase 280 (H) 45 - 117 U/L    Protein, total 4.7 (L) 6.4 - 8.2 g/dL    Albumin 2.1 (L) 3.5 - 5.0 g/dL    Globulin 2.6 2.0 - 4.0 g/dL    A-G Ratio 0.8 (L) 1.1 - 2.2         Assessment     Belia Swanson is a 62 y. o.yr old male with a biliary stricture vs neoplasm s/p ERCP with biopsies and stenting who developed post-procedure cholangitis and sepsis. Plan     -Continue antibiotics per ID recommendations. He continues to improve.    -Pathology from biopsies pending  -Will need chest CT for staging if this shows malignancy but would wait until renal function is at baseline.   -Added for tumor board discussion this week  -If necessary, would plan on interval operative intervention once he is home and after he fully recovers from this episode.       Neida Aquino MD  8/28/2017  9:27 AM

## 2017-08-28 NOTE — PROGRESS NOTES
TRANSFER - IN REPORT:    Verbal report received from [facility] (name) on Radha Thompson  being received from ICU (unit) for routine progression of care      Report consisted of patients Situation, Background, Assessment and   Recommendations(SBAR). Information from the following report(s) SBAR, Kardex, [facility] and Recent Results was reviewed with the receiving nurse. Opportunity for questions and clarification was provided. Assessment completed upon patients arrival to unit and care assumed.

## 2017-08-29 LAB
ALBUMIN SERPL-MCNC: 2.2 G/DL (ref 3.5–5)
ALBUMIN/GLOB SERPL: 0.8 {RATIO} (ref 1.1–2.2)
ALP SERPL-CCNC: 252 U/L (ref 45–117)
ALT SERPL-CCNC: 78 U/L (ref 12–78)
ANION GAP SERPL CALC-SCNC: 7 MMOL/L (ref 5–15)
AST SERPL-CCNC: 19 U/L (ref 15–37)
BASOPHILS # BLD: 0 K/UL (ref 0–0.1)
BASOPHILS NFR BLD: 0 % (ref 0–1)
BILIRUB SERPL-MCNC: 2.4 MG/DL (ref 0.2–1)
BUN SERPL-MCNC: 32 MG/DL (ref 6–20)
BUN/CREAT SERPL: 21 (ref 12–20)
CALCIUM SERPL-MCNC: 8.8 MG/DL (ref 8.5–10.1)
CHLORIDE SERPL-SCNC: 110 MMOL/L (ref 97–108)
CO2 SERPL-SCNC: 23 MMOL/L (ref 21–32)
CREAT SERPL-MCNC: 1.52 MG/DL (ref 0.7–1.3)
DIFFERENTIAL METHOD BLD: ABNORMAL
EOSINOPHIL # BLD: 0.3 K/UL (ref 0–0.4)
EOSINOPHIL NFR BLD: 3 % (ref 0–7)
ERYTHROCYTE [DISTWIDTH] IN BLOOD BY AUTOMATED COUNT: 14.6 % (ref 11.5–14.5)
GLOBULIN SER CALC-MCNC: 2.7 G/DL (ref 2–4)
GLUCOSE SERPL-MCNC: 129 MG/DL (ref 65–100)
HCT VFR BLD AUTO: 36.9 % (ref 36.6–50.3)
HGB BLD-MCNC: 12.8 G/DL (ref 12.1–17)
LYMPHOCYTES # BLD: 0.9 K/UL (ref 0.8–3.5)
LYMPHOCYTES NFR BLD: 9 % (ref 12–49)
MCH RBC QN AUTO: 30.4 PG (ref 26–34)
MCHC RBC AUTO-ENTMCNC: 34.7 G/DL (ref 30–36.5)
MCV RBC AUTO: 87.6 FL (ref 80–99)
METAMYELOCYTES NFR BLD MANUAL: 2 %
MONOCYTES # BLD: 0.8 K/UL (ref 0–1)
MONOCYTES NFR BLD: 8 % (ref 5–13)
MYELOCYTES NFR BLD MANUAL: 1 %
NEUTS BAND NFR BLD MANUAL: 2 % (ref 0–6)
NEUTS SEG # BLD: 8 K/UL (ref 1.8–8)
NEUTS SEG NFR BLD: 75 % (ref 32–75)
PLATELET # BLD AUTO: 66 K/UL (ref 150–400)
PLATELET COMMENTS,PCOM: ABNORMAL
POTASSIUM SERPL-SCNC: 3.4 MMOL/L (ref 3.5–5.1)
PROT SERPL-MCNC: 4.9 G/DL (ref 6.4–8.2)
RBC # BLD AUTO: 4.21 M/UL (ref 4.1–5.7)
RBC MORPH BLD: ABNORMAL
SODIUM SERPL-SCNC: 140 MMOL/L (ref 136–145)
WBC # BLD AUTO: 10.4 K/UL (ref 4.1–11.1)
WBC MORPH BLD: ABNORMAL

## 2017-08-29 PROCEDURE — 65270000032 HC RM SEMIPRIVATE

## 2017-08-29 PROCEDURE — 74011250636 HC RX REV CODE- 250/636: Performed by: HOSPITALIST

## 2017-08-29 PROCEDURE — 74011250636 HC RX REV CODE- 250/636: Performed by: FAMILY MEDICINE

## 2017-08-29 PROCEDURE — 74011000250 HC RX REV CODE- 250: Performed by: INTERNAL MEDICINE

## 2017-08-29 PROCEDURE — 74011000258 HC RX REV CODE- 258: Performed by: INTERNAL MEDICINE

## 2017-08-29 PROCEDURE — 36415 COLL VENOUS BLD VENIPUNCTURE: CPT | Performed by: FAMILY MEDICINE

## 2017-08-29 PROCEDURE — 74011000250 HC RX REV CODE- 250: Performed by: FAMILY MEDICINE

## 2017-08-29 PROCEDURE — 74011250637 HC RX REV CODE- 250/637: Performed by: SPECIALIST

## 2017-08-29 PROCEDURE — 80053 COMPREHEN METABOLIC PANEL: CPT | Performed by: FAMILY MEDICINE

## 2017-08-29 PROCEDURE — 74011250636 HC RX REV CODE- 250/636: Performed by: INTERNAL MEDICINE

## 2017-08-29 PROCEDURE — 85025 COMPLETE CBC W/AUTO DIFF WBC: CPT | Performed by: FAMILY MEDICINE

## 2017-08-29 RX ORDER — PANTOPRAZOLE SODIUM 40 MG/1
40 TABLET, DELAYED RELEASE ORAL
Status: DISCONTINUED | OUTPATIENT
Start: 2017-08-30 | End: 2017-08-31 | Stop reason: HOSPADM

## 2017-08-29 RX ORDER — ENOXAPARIN SODIUM 100 MG/ML
40 INJECTION SUBCUTANEOUS EVERY 24 HOURS
Status: DISCONTINUED | OUTPATIENT
Start: 2017-08-29 | End: 2017-08-31 | Stop reason: HOSPADM

## 2017-08-29 RX ADMIN — ENOXAPARIN SODIUM 40 MG: 40 INJECTION SUBCUTANEOUS at 12:58

## 2017-08-29 RX ADMIN — Medication 10 ML: at 12:53

## 2017-08-29 RX ADMIN — Medication 10 ML: at 21:34

## 2017-08-29 RX ADMIN — AZTREONAM 2 G: 2 INJECTION, POWDER, LYOPHILIZED, FOR SOLUTION INTRAMUSCULAR; INTRAVENOUS at 12:52

## 2017-08-29 RX ADMIN — LEVOFLOXACIN 750 MG: 5 INJECTION, SOLUTION INTRAVENOUS at 13:46

## 2017-08-29 RX ADMIN — METRONIDAZOLE 500 MG: 500 INJECTION, SOLUTION INTRAVENOUS at 07:28

## 2017-08-29 RX ADMIN — Medication 10 ML: at 21:33

## 2017-08-29 RX ADMIN — AZTREONAM 2 G: 2 INJECTION, POWDER, LYOPHILIZED, FOR SOLUTION INTRAMUSCULAR; INTRAVENOUS at 06:15

## 2017-08-29 RX ADMIN — GABAPENTIN 900 MG: 300 CAPSULE ORAL at 09:48

## 2017-08-29 RX ADMIN — VANCOMYCIN HYDROCHLORIDE 1250 MG: 10 INJECTION, POWDER, LYOPHILIZED, FOR SOLUTION INTRAVENOUS at 04:03

## 2017-08-29 RX ADMIN — VANCOMYCIN HYDROCHLORIDE 1250 MG: 10 INJECTION, POWDER, LYOPHILIZED, FOR SOLUTION INTRAVENOUS at 22:37

## 2017-08-29 RX ADMIN — GABAPENTIN 900 MG: 300 CAPSULE ORAL at 17:40

## 2017-08-29 RX ADMIN — METRONIDAZOLE 500 MG: 500 INJECTION, SOLUTION INTRAVENOUS at 23:48

## 2017-08-29 RX ADMIN — METRONIDAZOLE 500 MG: 500 INJECTION, SOLUTION INTRAVENOUS at 15:31

## 2017-08-29 RX ADMIN — AZTREONAM 2 G: 2 INJECTION, POWDER, LYOPHILIZED, FOR SOLUTION INTRAMUSCULAR; INTRAVENOUS at 21:33

## 2017-08-29 NOTE — PROGRESS NOTES
Spiritual Care Partner Volunteer visited patient in [address] on 8/29/17. Documented by:  Stacey Welsh M.Div.    Paging Service [phone])

## 2017-08-29 NOTE — PROGRESS NOTES
Problem: Falls - Risk of  Goal: *Absence of Falls  Document Rosalia Fall Risk and appropriate interventions in the flowsheet. Outcome: Progressing Towards Goal  Fall Risk Interventions:  Mobility Interventions: Patient to call before getting OOB     Mentation Interventions: Increase mobility     Medication Interventions: Assess postural VS orthostatic hypotension, Evaluate medications/consider consulting pharmacy, Teach patient to arise slowly, Patient to call before getting OOB     Elimination Interventions: Call light in reach      Pt remains free of falls while in the hospital.  Call bell and frequently used items within reach. Pt provided with skid free socks and instructed to call out for assistance ambulating. Frequent purposeful rounding initiated by staff.  Pt verbalizes and demonstrates understanding and compliance with fall protocols                    Problem: Pressure Injury - Risk of  Goal: *Prevention of pressure ulcer  Outcome: Progressing Towards Goal  Pt turns self, use of pillows to offload heels, and pressure injury education provided, no pressure injury currently        Problem: Infection - Risk of, Central Venous Catheter-Associated Bloodstream Infection  Goal: *Absence of infection signs and symptoms  Outcome: Progressing Towards Goal  Pt is frequently monitored for s&s of infection

## 2017-08-29 NOTE — PROGRESS NOTES
[address]  [address] [place] [address], [address]  [phone]                GI PROGRESS NOTE      NAME:   Penelope Mak   :    1955   MRN:    [id number]     Assessment/Plan   CBD stricture- with obstructive jaundice and septic shock s/p ERCP with Spyglass and metal stent placement. Improving clinically as well as labs. WBC and LFTs elevated. Concern raised for gallbladder/cystic duct obstruction from stent. If he clinically decompensates further, would advise IR consultation for percutaneous cholecystostomy tube placement.  - appear to be improving clinically  - Supportive management per primary team   - Continue antibiotics  - Advance to GI lite diet     Patient Active Problem List   Diagnosis Code    Obstructive jaundice K83.8    Common bile duct (CBD) obstruction K83.1    UTI (urinary tract infection) N39.0    Sepsis (HCC) A41.9       Subjective:     Penelope Mak is a 62 y.o.  male who is feeling much better. Had a BM today. No fever and stable hemodynamically. Review of Systems    Constitutional: negative fever, negative chills, negative weight loss  Eyes:   negative visual changes  ENT:   negative sore throat, tongue or lip swelling  Respiratory:  negative cough, negative dyspnea  Cards:  negative for chest pain, palpitations, lower extremity edema  GI:   See HPI  :  negative for frequency, dysuria  Integument:  negative for rash and pruritus  Heme:  negative for easy bruising and gum/nose bleeding  Musculoskel: negative for myalgias,  back pain and muscle weakness  Neuro: negative for headaches, dizziness, vertigo  Psych:  negative for feelings of anxiety, depression           Objective:     VITALS:   Last 24hrs VS reviewed since prior hospitalist progress note.  Most recent are:  Visit Vitals    /69    Pulse 93    Temp 97.7 °F (36.5 °C)    Resp 20    Ht 5' 8.5\" (1.74 m)    Wt 101 kg (222 lb 10.6 oz)    SpO2 98%    BMI 33.36 kg/m2 Intake/Output Summary (Last 24 hours) at 08/28/17 2045  Last data filed at 08/28/17 2025   Gross per 24 hour   Intake             1160 ml   Output             2275 ml   Net            -1115 ml        PHYSICAL EXAM:  General   well developed, well nourished, appears stated age, in no acute distress  EENT  Normocephalic, Atraumatic, PERRLA, EOMI, sclera clear, nares clear, pharynx normal  Neck   Supple without nodes or mass. No thyromegaly or bruit  Respiratory   Clear To Auscultation bilaterally - no wheezes, rales, rhonchi, or crackles  Cardiology  Regular Rate and Rythmn  - no murmurs, rubs or gallops  Abdominal  Soft, non-tender, non-distended, positive bowel sounds, no hepatosplenomegaly, no palpable mass  Extremities  No clubbing, cyanosis, or edema. Pulses intact. Back  No spinal or muscle pain. No CVAT. Skin  Normal skin turgor. No rashes or skin ulcers noted  Neurological  No focal neurological deficits noted  Psychological  Oriented x 3. Normal affect. Lab Data No results found for this or any previous visit (from the past 12 hour(s)).       Medications: Reviewed    PMH/SH reviewed - no change compared to H&P  Attending Physician: Fernando Shi MD   Date/Time:  8/28/2017

## 2017-08-29 NOTE — PROGRESS NOTES
Hospitalist Progress Note          Guillaume Cohen MD  Please call  and page for questions. Call physician on-call through the  7pm-7am    Daily Progress Note: 8/29/2017    Primary care provider:None    Date of admission: 8/24/2017  2:43 PM    Admission summery and hospital course:  Patient was admitted to the intensive care unit for management of sepsis. Patient has obstructive jaundice and biliary stricture. He underwent ERCP and had his biliary stent replaced a day prior th this admission. This procedure has had fevers and hypotension. Subjective:   Patient said he is feeling better today. He said his leg swelling has been improving. Assessment/Plan:   Sepsis with septic shock  Sepsis secondary to possible cholangitis, post ERCP. Improving clinically. Off pressor since 8/27. ID following and currently on vanc, flagyl, aztreonem and levaquin. Follow cultures.      Abnormal LFT  Status post ERCP and stone extraction, as well as sweep biopsy. Biopsy showed adenocarcinoma. Improving bilirubin. General surgery and GI on board, continue conservative management. Tolerating advanced diet. Patient needs CT for staging prior to discharge home. To be discussed in tumor board.       Leukocytosis   Improving, continue current management.      Acute renal failure  Secondary to sepsis and ATN from hypotension. Improving serum creatinine.      Metabolic acidosis  Secondary to sepsis and renal failure. Improving.     Hypertension: Resume his home medicine when BP is elevated. See orders for other plans. VTE prophylaxis: Lovenox, protonix. Code status: Full  Discussed plan of care with Patient/Family and Nurse. Pre-admission lived at home. Discharge planning: pending.         Review of Systems:     Review of Systems:  Symptom  Y/N  Comments   Symptom  Y/N  Comments    Fever/Chills   n   Chest Pain  n    Poor Appetite  n    Edema  n     Cough  n   Abdominal Pain   n    Sputum  n   Joint Pain      SOB/VIVEROS  n   Pruritis/Rash      Nausea/vomit  n   Tolerating PT/OT      Diarrhea     Tolerating Diet  y    Constipation     Other      Could not obtain due to:         Objective:   Physical Exam:     Visit Vitals    /69 (BP 1 Location: Left arm, BP Patient Position: At rest)    Pulse 82    Temp 97.7 °F (36.5 °C)    Resp 16    Ht 5' 8.5\" (1.74 m)    Wt 102.5 kg (226 lb)    SpO2 96%    BMI 33.86 kg/m2    O2 Flow Rate (L/min): 2 l/min O2 Device: Room air    Temp (24hrs), Av.8 °F (36.6 °C), Min:97.6 °F (36.4 °C), Max:98.2 °F (36.8 °C)    701 - 1900  In: -   Out: 550 [Urine:550]   1901 -  0700  In: 1210 [P.O.:360; I.V.:850]  Out: 4350 [Urine:4350]      General:  Alert, cooperative, no distress, appears stated age. Lungs:   Clear to auscultation bilaterally. Heart:  Regular rate and rhythm, S1, S2 normal, no murmur, click, rub or gallop. Abdomen:   Soft, non-tender. Bowel sounds normal. No masses,  No organomegaly. Extremities: Extremities normal, atraumatic, no cyanosis. Edema at LEs. Skin: Skin color, texture, turgor normal. No rashes or lesions   Neurologic: CNII-XII intact. Data Review:       Recent Days:  Recent Labs      17   0404  17   0538   WBC  10.4  11.2*  12.0*   HGB  12.8  11.8*  11.6*   HCT  36.9  34.7*  33.9*   PLT  66*  61*  54*     Recent Labs      17   0404  17   0538   NA  140  139  133*   K  3.4*  3.4*  3.7   CL  110*  109*  105   CO2  23  21  20*   GLU  129*  151*  125*   BUN  32*  37*  41*   CREA  1.52*  1.79*  2.02*   CA  8.8  8.1*  7.7*   MG   --    --   2.1   PHOS   --    --   2.4*   ALB  2.2*  2.1*  2.1*   SGOT  19  29  86*   ALT  78  107*  143*     No results for input(s): PH, PCO2, PO2, HCO3, FIO2 in the last 72 hours.     24 Hour Results:  Recent Results (from the past 24 hour(s))   METABOLIC PANEL, COMPREHENSIVE    Collection Time: 17 4:29 AM   Result Value Ref Range    Sodium 140 136 - 145 mmol/L    Potassium 3.4 (L) 3.5 - 5.1 mmol/L    Chloride 110 (H) 97 - 108 mmol/L    CO2 23 21 - 32 mmol/L    Anion gap 7 5 - 15 mmol/L    Glucose 129 (H) 65 - 100 mg/dL    BUN 32 (H) 6 - 20 MG/DL    Creatinine 1.52 (H) 0.70 - 1.30 MG/DL    BUN/Creatinine ratio 21 (H) 12 - 20      GFR est AA 57 (L) >60 ml/min/1.73m2    GFR est non-AA 47 (L) >60 ml/min/1.73m2    Calcium 8.8 8.5 - 10.1 MG/DL    Bilirubin, total 2.4 (H) 0.2 - 1.0 MG/DL    ALT (SGPT) 78 12 - 78 U/L    AST (SGOT) 19 15 - 37 U/L    Alk. phosphatase 252 (H) 45 - 117 U/L    Protein, total 4.9 (L) 6.4 - 8.2 g/dL    Albumin 2.2 (L) 3.5 - 5.0 g/dL    Globulin 2.7 2.0 - 4.0 g/dL    A-G Ratio 0.8 (L) 1.1 - 2.2     CBC WITH AUTOMATED DIFF    Collection Time: 08/29/17  4:29 AM   Result Value Ref Range    WBC 10.4 4.1 - 11.1 K/uL    RBC 4.21 4.10 - 5.70 M/uL    HGB 12.8 12.1 - 17.0 g/dL    HCT 36.9 36.6 - 50.3 %    MCV 87.6 80.0 - 99.0 FL    MCH 30.4 26.0 - 34.0 PG    MCHC 34.7 30.0 - 36.5 g/dL    RDW 14.6 (H) 11.5 - 14.5 %    PLATELET 66 (L) 411 - 400 K/uL    NEUTROPHILS 75 32 - 75 %    BAND NEUTROPHILS 2 0 - 6 %    LYMPHOCYTES 9 (L) 12 - 49 %    MONOCYTES 8 5 - 13 %    EOSINOPHILS 3 0 - 7 %    BASOPHILS 0 0 - 1 %    METAMYELOCYTES 2 (H) 0 %    MYELOCYTES 1 (H) 0 %    ABS. NEUTROPHILS 8.0 1.8 - 8.0 K/UL    ABS. LYMPHOCYTES 0.9 0.8 - 3.5 K/UL    ABS. MONOCYTES 0.8 0.0 - 1.0 K/UL    ABS. EOSINOPHILS 0.3 0.0 - 0.4 K/UL    ABS.  BASOPHILS 0.0 0.0 - 0.1 K/UL    DF MANUAL      PLATELET COMMENTS LARGE PLATELETS      RBC COMMENTS ANISOCYTOSIS  1+        WBC COMMENTS DOHLE BODIES         Problem List:  Problem List as of 8/29/2017  Date Reviewed: 8/24/2017          Codes Class Noted - Resolved    Sepsis ([facility] [[email or web address] ICD-10-CM: A41.9  ICD-9-CM: 038.9, 995.91  8/25/2017 - Present        Common bile duct (CBD) obstruction ICD-10-CM: K83.1  ICD-9-CM: 576.2  7/25/2017 - Present        UTI (urinary tract infection) ICD-10-CM: N39.0  ICD-9-CM: 599.0  7/25/2017 - Present        Obstructive jaundice ICD-10-CM: K83.8  ICD-9-CM: 576.8  7/23/2017 - Present              Medications reviewed  Current Facility-Administered Medications   Medication Dose Route Frequency    levoFLOXacin (LEVAQUIN) 750 mg in D5W IVPB  750 mg IntraVENous Q24H    vancomycin (VANCOCIN) 1250 mg in  ml infusion  1,250 mg IntraVENous Q18H    ondansetron (ZOFRAN) injection 4 mg  4 mg IntraVENous Q4H PRN    prochlorperazine (COMPAZINE) injection 5 mg  5 mg IntraVENous Q6H PRN    aztreonam (AZACTAM) 2 g in 0.9% sodium chloride (MBP/ADV) 100 mL  2 g IntraVENous Q8H    albuterol-ipratropium (DUO-NEB) 2.5 MG-0.5 MG/3 ML  3 mL Nebulization Q4H PRN    docusate sodium (COLACE) capsule 100 mg  100 mg Oral BID    sodium chloride (NS) flush 5-10 mL  5-10 mL IntraVENous PRN    VAncomycin Pharmacy Dosing   Other Rx Dosing/Monitoring    sodium chloride (NS) flush 10-30 mL  10-30 mL InterCATHeter PRN    sodium chloride (NS) flush 10 mL  10 mL InterCATHeter Q24H    sodium chloride (NS) flush 10-40 mL  10-40 mL InterCATHeter Q8H    alteplase (CATHFLO) 1 mg in sterile water (preservative free) 1 mL injection  1 mg InterCATHeter PRN    bacitracin 500 unit/gram packet 1 Packet  1 Packet Topical PRN    acetaminophen (TYLENOL) tablet 650 mg  650 mg Oral Q6H PRN    gabapentin (NEURONTIN) capsule 900 mg  900 mg Oral BID    lisinopril (PRINIVIL, ZESTRIL) tablet 40 mg  40 mg Oral DAILY    sodium chloride (NS) flush 5-10 mL  5-10 mL IntraVENous Q8H    sodium chloride (NS) flush 5-10 mL  5-10 mL IntraVENous PRN    metroNIDAZOLE (FLAGYL) IVPB premix 500 mg  500 mg IntraVENous Q8H    HYDROmorphone (PF) (DILAUDID) injection 1 mg  1 mg IntraVENous Q4H PRN    diphenhydrAMINE (BENADRYL) injection 12.5 mg  12.5 mg IntraVENous Q6H PRN    dexamethasone (DECADRON) 4 mg/mL injection 4 mg  4 mg IntraVENous Q6H PRN       Care Plan discussed with: Patient/Family and Nurse    Total time spent with patient: 30 minutes.     Gianluca Whitlock MD

## 2017-08-29 NOTE — PROGRESS NOTES
Care Management-Patient was initially admitted as   observation status for abdominal pain and fever. Patient was then admitted as inpatient on 8-25-17 and sent to ICU for sepsis. Met with patient in the room. He is independent with his ADLs, works, and drives. He does not have medical equipment. He has never been to rehab or had home health. He lives with his wife Gordo Fitzgerald [phone]) and their two sons. Patient is in the process of changing PCPs. He has a new patient appointment scheduled with Dr. Isaac Soni III on 8-31-17 for 10:00 AM. Patient was unsure if his wife was going to change the appointment which is in 2 days or keep it if he is discharged from the hospital. Updated PCP name in chart. No other transition of care needs identified or verbalized. Care Manager will be available to assist if needs do arise. Care Management Interventions  PCP Verified by CM: Yes (Dr. Darryl Tapia)  Mode of Transport at Discharge:  Other (see comment) (family)  Discharge Durable Medical Equipment: No  Health Maintenance Reviewed: Yes  Physical Therapy Consult: No  Occupational Therapy Consult: No  Speech Therapy Consult: No  Current Support Network: Lives with Spouse Gordo Fitzgerald [phone])  Confirm Follow Up Transport: Self  Plan discussed with Pt/Family/Caregiver: Yes  Discharge Location  Discharge Placement: [facility]

## 2017-08-29 NOTE — PROGRESS NOTES
ID Progress Note  2017    Subjective:     Doing fairly well--moving out of the ICU setting    Objective:     Antibiotics:  1. Levaquin  2. Vancomycin   3. Azactam    4. Flagyl     Vitals:   Visit Vitals    /88 (BP 1 Location: Left arm, BP Patient Position: At rest)    Pulse 88    Temp 97.7 °F (36.5 °C)    Resp 16    Ht 5' 8.5\" (1.74 m)    Wt 102.5 kg (226 lb)    SpO2 98%    BMI 33.86 kg/m2        Tmax:  Temp (24hrs), Av.8 °F (36.6 °C), Min:97.7 °F (36.5 °C), Max:98.2 °F (36.8 °C)      Exam:  Lungs:  clear to auscultation bilaterally  Heart:  regular rate and rhythm  Abdomen:  abnormal findings:  distended  Skin:  no rash or abnormalities    Labs:      Recent Labs      17   0429  17   0404  17   0538   WBC  10.4  11.2*  12.0*   HGB  12.8  11.8*  11.6*   PLT  66*  61*  54*   BUN  32*  37*  41*   CREA  1.52*  1.79*  2.02*   SGOT  19  29  86*   AP  252*  280*  252*   TBILI  2.4*  3.8*  6.2*       Cultures:     No results found for: SDES  Lab Results   Component Value Date/Time    Culture result: NO GROWTH 4 DAYS 2017 12:13 AM    Culture result: NO GROWTH 2 DAYS 2017 06:39 PM       Radiology:     Line/Insert Date:           Assessment:     1. Biliary obstruction with stenting  2. ?cholangitis  3. Leukocytosis--resolved    Objective:     1. Continue current therapy--home on cipro 500 bid and Flagyl 500 tid for 10 more days when discharged  2.  Follow up cultures--negative to date    Lara Goodman MD

## 2017-08-29 NOTE — PROGRESS NOTES
[address]  [address] [facility] [address]  [address] [facility], [address]  [phone]                GI PROGRESS NOTE  [facility]  Work Cell: [phone]      NAME:   Isa Britton   :    1955   MRN:    [id number]     Assessment/Plan   CBD stricture- with obstructive jaundice and septic shock s/p ERCP with Spyglass and metal stent placement. WBC normalized. LFTs trending down. Concern raised for gallbladder/cystic duct obstruction from stent. If he clinically decompensates, would advise IR consultation for percutaneous cholecystostomy tube placement.  - appears to be improving. Distal CBD biopsies demonstrated adenocarcinoma.   - GI lite diet  - Supportive management per primary team   - Antibiotics per ID  - Plans for chest CT and tumor board discussion  - Monitor clinical course      Patient Active Problem List   Diagnosis Code    Obstructive jaundice K83.8    Common bile duct (CBD) obstruction K83.1    UTI (urinary tract infection) N39.0    Sepsis ([address] A41.9       Subjective:     Feeling much better. Denies any abdominal pain. Tolerating diet without nausea or vomiting. Passing flatus and having BMs. No fever or chills. Review of Systems    Constitutional: negative fever, negative chills, negative weight loss  Eyes:   negative visual changes  ENT:   negative sore throat, tongue or lip swelling  Respiratory:  negative cough, negative dyspnea  Cards:  negative for chest pain, palpitations, lower extremity edema  GI:   See HPI  :  negative for frequency, dysuria  Integument:  negative for rash and pruritus  Heme:  negative for easy bruising and gum/nose bleeding  Musculoskel: negative for myalgias, back pain and muscle weakness  Neuro: negative for headaches, dizziness, vertigo  Psych:  negative for feelings of anxiety, depression       Objective:     VITALS:   Last 24hrs VS reviewed since prior hospitalist progress note.  Most recent are:  Visit Vitals    /69 (BP 1 Location: Left arm, BP Patient Position: At rest)    Pulse 82    Temp 97.7 °F (36.5 °C)    Resp 16    Ht 5' 8.5\" (1.74 m)    Wt 102.5 kg (226 lb)    SpO2 96%    BMI 33.86 kg/m2       Intake/Output Summary (Last 24 hours) at 08/29/17 1047  Last data filed at 08/29/17 5726   Gross per 24 hour   Intake              720 ml   Output             3150 ml   Net            -2430 ml        PHYSICAL EXAM:  General   well developed, alert, pleasant, in no acute distress  EENT  Normocephalic, Atraumatic, PERRLA, EOMI, scleral icterus  Respiratory   Clear To Auscultation bilaterally - no wheezes, rales, rhonchi, or crackles  Cardiology  Regular Rate and Rythmn  - no murmurs, rubs or gallops  Abdominal  Soft, mildly distended, non-tender, positive bowel sounds, no hepatosplenomegaly, no palpable mass  Extremities  No clubbing, cyanosis, or edema. Pulses intact. Neurological  No focal neurological deficits noted  Psychological  Oriented x 3. Normal affect. Lab Data   Recent Results (from the past 12 hour(s))   METABOLIC PANEL, COMPREHENSIVE    Collection Time: 08/29/17  4:29 AM   Result Value Ref Range    Sodium 140 136 - 145 mmol/L    Potassium 3.4 (L) 3.5 - 5.1 mmol/L    Chloride 110 (H) 97 - 108 mmol/L    CO2 23 21 - 32 mmol/L    Anion gap 7 5 - 15 mmol/L    Glucose 129 (H) 65 - 100 mg/dL    BUN 32 (H) 6 - 20 MG/DL    Creatinine 1.52 (H) 0.70 - 1.30 MG/DL    BUN/Creatinine ratio 21 (H) 12 - 20      GFR est AA 57 (L) >60 ml/min/1.73m2    GFR est non-AA 47 (L) >60 ml/min/1.73m2    Calcium 8.8 8.5 - 10.1 MG/DL    Bilirubin, total 2.4 (H) 0.2 - 1.0 MG/DL    ALT (SGPT) 78 12 - 78 U/L    AST (SGOT) 19 15 - 37 U/L    Alk.  phosphatase 252 (H) 45 - 117 U/L    Protein, total 4.9 (L) 6.4 - 8.2 g/dL    Albumin 2.2 (L) 3.5 - 5.0 g/dL    Globulin 2.7 2.0 - 4.0 g/dL    A-G Ratio 0.8 (L) 1.1 - 2.2     CBC WITH AUTOMATED DIFF    Collection Time: 08/29/17  4:29 AM   Result Value Ref Range    WBC 10.4 4.1 - 11.1 K/uL    RBC 4. 21 4.10 - 5.70 M/uL    HGB 12.8 12.1 - 17.0 g/dL    HCT 36.9 36.6 - 50.3 %    MCV 87.6 80.0 - 99.0 FL    MCH 30.4 26.0 - 34.0 PG    MCHC 34.7 30.0 - 36.5 g/dL    RDW 14.6 (H) 11.5 - 14.5 %    PLATELET 66 (L) 146 - 400 K/uL    NEUTROPHILS 75 32 - 75 %    BAND NEUTROPHILS 2 0 - 6 %    LYMPHOCYTES 9 (L) 12 - 49 %    MONOCYTES 8 5 - 13 %    EOSINOPHILS 3 0 - 7 %    BASOPHILS 0 0 - 1 %    METAMYELOCYTES 2 (H) 0 %    MYELOCYTES 1 (H) 0 %    ABS. NEUTROPHILS 8.0 1.8 - 8.0 K/UL    ABS. LYMPHOCYTES 0.9 0.8 - 3.5 K/UL    ABS. MONOCYTES 0.8 0.0 - 1.0 K/UL    ABS. EOSINOPHILS 0.3 0.0 - 0.4 K/UL    ABS. BASOPHILS 0.0 0.0 - 0.1 K/UL    DF MANUAL      PLATELET COMMENTS LARGE PLATELETS      RBC COMMENTS ANISOCYTOSIS  1+        WBC COMMENTS DOHLE BODIES           Medications: Reviewed    PMH/SH reviewed - no change compared to H&P  Mid-Level Provider: Madalyn Martinez NP   Date/Time:  8/29/2017        I have personally reviewed the history and independently examined the patient. I have reviewed the chart and agree with the documentation recorded by the Mid Level Provider, including the assessment, treatment plan, and disposition.       Marc Jeronimo MD

## 2017-08-29 NOTE — PROGRESS NOTES
Problem: Falls - Risk of  Goal: *Absence of Falls  Document Rosalia Fall Risk and appropriate interventions in the flowsheet.    Outcome: Progressing Towards Goal  Fall Risk Interventions:  Mobility Interventions: Patient to call before getting OOB     Mentation Interventions: Increase mobility     Medication Interventions: Assess postural VS orthostatic hypotension     Elimination Interventions: Call light in reach                 Problem: Sepsis: Day 5  Goal: *Demonstrates progressive activity  Outcome: Progressing Towards Goal  Pt ambulating in the jeffers with family    Problem: Infection - Risk of, Central Venous Catheter-Associated Bloodstream Infection  Goal: *Absence of infection signs and symptoms  Outcome: Resolved/Met Date Met:  08/29/17  Central line removed 8/28/17

## 2017-08-29 NOTE — PROGRESS NOTES
[facility] General Surgery    Subjective     No acute events. Remains afebrile. Tolerating diet, no complaints of pain, having bowel function. Objective     Patient Vitals for the past 24 hrs:   Temp Pulse Resp BP SpO2   08/29/17 0824 97.7 °F (36.5 °C) 82 16 134/69 96 %   08/29/17 0409 97.7 °F (36.5 °C) 86 18 143/82 97 %   08/28/17 2341 98.2 °F (36.8 °C) 88 19 127/80 95 %   08/28/17 2025 97.7 °F (36.5 °C) 93 20 124/69 98 %   08/28/17 1549 98.1 °F (36.7 °C) 90 20 135/79 97 %   08/28/17 1200 97.6 °F (36.4 °C) 87 23 124/81 96 %   08/28/17 1000 - 93 22 126/77 96 %         Date 08/28/17 0700 - 08/29/17 0659 08/29/17 0700 - 08/30/17 0659   Shift 2181-7613 3815-0441 24 Hour Total 5606-0371 9054-2631 24 Hour Total   I  N  T  A  K  E   P.O. 360  360         P. O. 360  360       I.V.  (mL/kg/hr) 600  (0.5)  600  (0.2)         Volume (metroNIDAZOLE (FLAGYL) IVPB premix 500 mg) 300  300         Volume (aztreonam (AZACTAM) 2 g in 0.9% sodium chloride (MBP/ADV) 100 mL) 300  300       Shift Total  (mL/kg) 960  (9.5)  960  (9.5)      O  U  T  P  U  T   Urine  (mL/kg/hr) 1250  (1) 2000  (1.7) 3250  (1.3) 550  550      Urine Voided 1250 2000 3250 550  550      Urine Occurrence(s) 701 x  701 x       Stool            Stool Occurrence(s) 2 x  2 x       Shift Total  (mL/kg) 1250  (12.4) 2000  (19.8) 3250  (32.2) 550  (5.4)  550  (5.4)   NET -290 -2000 -2290 -550  -550   Weight (kg) 101 101 101 102.5 102.5 102.5       PE  GEN - Awake, alert, communicating appropriately. NAD  Abd - soft, NT, ND  Ext - warm, well perfused.       Labs  Recent Results (from the past 24 hour(s))   METABOLIC PANEL, COMPREHENSIVE    Collection Time: 08/29/17  4:29 AM   Result Value Ref Range    Sodium 140 136 - 145 mmol/L    Potassium 3.4 (L) 3.5 - 5.1 mmol/L    Chloride 110 (H) 97 - 108 mmol/L    CO2 23 21 - 32 mmol/L    Anion gap 7 5 - 15 mmol/L    Glucose 129 (H) 65 - 100 mg/dL    BUN 32 (H) 6 - 20 MG/DL    Creatinine 1.52 (H) 0.70 - 1.30 MG/DL    BUN/Creatinine ratio 21 (H) 12 - 20      GFR est AA 57 (L) >60 ml/min/1.73m2    GFR est non-AA 47 (L) >60 ml/min/1.73m2    Calcium 8.8 8.5 - 10.1 MG/DL    Bilirubin, total 2.4 (H) 0.2 - 1.0 MG/DL    ALT (SGPT) 78 12 - 78 U/L    AST (SGOT) 19 15 - 37 U/L    Alk. phosphatase 252 (H) 45 - 117 U/L    Protein, total 4.9 (L) 6.4 - 8.2 g/dL    Albumin 2.2 (L) 3.5 - 5.0 g/dL    Globulin 2.7 2.0 - 4.0 g/dL    A-G Ratio 0.8 (L) 1.1 - 2.2     CBC WITH AUTOMATED DIFF    Collection Time: 08/29/17  4:29 AM   Result Value Ref Range    WBC 10.4 4.1 - 11.1 K/uL    RBC 4.21 4.10 - 5.70 M/uL    HGB 12.8 12.1 - 17.0 g/dL    HCT 36.9 36.6 - 50.3 %    MCV 87.6 80.0 - 99.0 FL    MCH 30.4 26.0 - 34.0 PG    MCHC 34.7 30.0 - 36.5 g/dL    RDW 14.6 (H) 11.5 - 14.5 %    PLATELET 66 (L) 723 - 400 K/uL    NEUTROPHILS 75 32 - 75 %    BAND NEUTROPHILS 2 0 - 6 %    LYMPHOCYTES 9 (L) 12 - 49 %    MONOCYTES 8 5 - 13 %    EOSINOPHILS 3 0 - 7 %    BASOPHILS 0 0 - 1 %    METAMYELOCYTES 2 (H) 0 %    MYELOCYTES 1 (H) 0 %    ABS. NEUTROPHILS 8.0 1.8 - 8.0 K/UL    ABS. LYMPHOCYTES 0.9 0.8 - 3.5 K/UL    ABS. MONOCYTES 0.8 0.0 - 1.0 K/UL    ABS. EOSINOPHILS 0.3 0.0 - 0.4 K/UL    ABS. BASOPHILS 0.0 0.0 - 0.1 K/UL    DF MANUAL      PLATELET COMMENTS LARGE PLATELETS      RBC COMMENTS ANISOCYTOSIS  1+        WBC COMMENTS DOHLE BODIES         Assessment     Manny Morales is a 62 y. o.yr old male with a biliary stricture vs neoplasm s/p ERCP with biopsies and stenting who developed post-procedure cholangitis and sepsis. 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 Plan     -Continue antibiotics per ID recommendations.   He continues to improve.    -Dispo plans per primary team.  I would like to see him about 1 week after discharge to discuss surgical plans.    -Will need chest CT for staging prior to discharge home.    -On for tumor board discussion this week      Neida Ambrose MD  8/29/2017  9:44 AM

## 2017-08-30 LAB
ANION GAP SERPL CALC-SCNC: 10 MMOL/L (ref 5–15)
BACTERIA SPEC CULT: NORMAL
BUN SERPL-MCNC: 27 MG/DL (ref 6–20)
BUN/CREAT SERPL: 17 (ref 12–20)
CALCIUM SERPL-MCNC: 8.3 MG/DL (ref 8.5–10.1)
CHLORIDE SERPL-SCNC: 107 MMOL/L (ref 97–108)
CO2 SERPL-SCNC: 24 MMOL/L (ref 21–32)
CREAT SERPL-MCNC: 1.56 MG/DL (ref 0.7–1.3)
GLUCOSE SERPL-MCNC: 161 MG/DL (ref 65–100)
POTASSIUM SERPL-SCNC: 3.2 MMOL/L (ref 3.5–5.1)
SERVICE CMNT-IMP: NORMAL
SODIUM SERPL-SCNC: 141 MMOL/L (ref 136–145)

## 2017-08-30 PROCEDURE — 80048 BASIC METABOLIC PNL TOTAL CA: CPT | Performed by: INTERNAL MEDICINE

## 2017-08-30 PROCEDURE — 65270000032 HC RM SEMIPRIVATE

## 2017-08-30 PROCEDURE — 74011250636 HC RX REV CODE- 250/636: Performed by: HOSPITALIST

## 2017-08-30 PROCEDURE — 74011250636 HC RX REV CODE- 250/636: Performed by: FAMILY MEDICINE

## 2017-08-30 PROCEDURE — 74011000258 HC RX REV CODE- 258: Performed by: INTERNAL MEDICINE

## 2017-08-30 PROCEDURE — 36415 COLL VENOUS BLD VENIPUNCTURE: CPT | Performed by: INTERNAL MEDICINE

## 2017-08-30 PROCEDURE — 74011250636 HC RX REV CODE- 250/636: Performed by: INTERNAL MEDICINE

## 2017-08-30 PROCEDURE — 74011250637 HC RX REV CODE- 250/637: Performed by: SPECIALIST

## 2017-08-30 PROCEDURE — 74011250637 HC RX REV CODE- 250/637: Performed by: FAMILY MEDICINE

## 2017-08-30 PROCEDURE — 74011250637 HC RX REV CODE- 250/637: Performed by: INTERNAL MEDICINE

## 2017-08-30 PROCEDURE — 74011000250 HC RX REV CODE- 250: Performed by: FAMILY MEDICINE

## 2017-08-30 PROCEDURE — 74011000250 HC RX REV CODE- 250: Performed by: INTERNAL MEDICINE

## 2017-08-30 RX ORDER — POTASSIUM CHLORIDE 750 MG/1
20 TABLET, FILM COATED, EXTENDED RELEASE ORAL 2 TIMES DAILY
Status: DISCONTINUED | OUTPATIENT
Start: 2017-08-30 | End: 2017-08-31 | Stop reason: HOSPADM

## 2017-08-30 RX ADMIN — Medication 10 ML: at 13:12

## 2017-08-30 RX ADMIN — ENOXAPARIN SODIUM 40 MG: 40 INJECTION SUBCUTANEOUS at 11:54

## 2017-08-30 RX ADMIN — Medication 10 ML: at 11:56

## 2017-08-30 RX ADMIN — AZTREONAM 2 G: 2 INJECTION, POWDER, LYOPHILIZED, FOR SOLUTION INTRAMUSCULAR; INTRAVENOUS at 13:12

## 2017-08-30 RX ADMIN — Medication 10 ML: at 22:32

## 2017-08-30 RX ADMIN — GABAPENTIN 900 MG: 300 CAPSULE ORAL at 09:46

## 2017-08-30 RX ADMIN — AZTREONAM 2 G: 2 INJECTION, POWDER, LYOPHILIZED, FOR SOLUTION INTRAMUSCULAR; INTRAVENOUS at 21:46

## 2017-08-30 RX ADMIN — METRONIDAZOLE 500 MG: 500 INJECTION, SOLUTION INTRAVENOUS at 06:51

## 2017-08-30 RX ADMIN — AZTREONAM 2 G: 2 INJECTION, POWDER, LYOPHILIZED, FOR SOLUTION INTRAMUSCULAR; INTRAVENOUS at 04:48

## 2017-08-30 RX ADMIN — METRONIDAZOLE 500 MG: 500 INJECTION, SOLUTION INTRAVENOUS at 15:57

## 2017-08-30 RX ADMIN — GABAPENTIN 900 MG: 300 CAPSULE ORAL at 17:19

## 2017-08-30 RX ADMIN — VANCOMYCIN HYDROCHLORIDE 1250 MG: 10 INJECTION, POWDER, LYOPHILIZED, FOR SOLUTION INTRAVENOUS at 15:39

## 2017-08-30 RX ADMIN — LEVOFLOXACIN 750 MG: 5 INJECTION, SOLUTION INTRAVENOUS at 14:03

## 2017-08-30 RX ADMIN — POTASSIUM CHLORIDE 20 MEQ: 750 TABLET, FILM COATED, EXTENDED RELEASE ORAL at 09:47

## 2017-08-30 RX ADMIN — METRONIDAZOLE 500 MG: 500 INJECTION, SOLUTION INTRAVENOUS at 22:28

## 2017-08-30 RX ADMIN — PANTOPRAZOLE SODIUM 40 MG: 40 TABLET, DELAYED RELEASE ORAL at 09:46

## 2017-08-30 RX ADMIN — POTASSIUM CHLORIDE 20 MEQ: 750 TABLET, FILM COATED, EXTENDED RELEASE ORAL at 17:19

## 2017-08-30 NOTE — PROGRESS NOTES
Problem: Sepsis: Day 4  Goal: *Demonstrates progressive activity  Outcome: Progressing Towards Goal  Ambulating halls

## 2017-08-30 NOTE — PROGRESS NOTES
[address] [facility].  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 WBC normalized. LFTs trending down. Concern raised for gallbladder/cystic duct obstruction from stent. If he clinically decompensates, would advise IR consultation for percutaneous cholecystostomy tube placement.  - continues to improve. Distal CBD biopsies demonstrated adenocarcinoma.   - GI lite diet  - Supportive management per primary team   - Antibiotics per ID  - Plans for chest CT and tumor board discussion  - Monitor clinical course - okay to discharge from GI standpoint when appropriate to follow-up outpatient in 2-4 weeks with Donna Busch/Radha     Patient Active Problem List   Diagnosis Code    Obstructive jaundice K83.8    Common bile duct (CBD) obstruction K83.1    UTI (urinary tract infection) N39.0    Sepsis (HCC) A41.9       Subjective:     Feeling relatively well. Denies any abdominal pain. Tolerating diet without nausea or vomiting. Passing flatus and having BMs. No fever or chills.        Review of Systems    Constitutional: negative fever, negative chills, negative weight loss  Eyes:   negative visual changes  ENT:   negative sore throat, tongue or lip swelling  Respiratory:  negative cough, negative dyspnea  Cards:  negative for chest pain, palpitations, lower extremity edema  GI:   See HPI  :  negative for frequency, dysuria  Integument:  negative for rash and pruritus  Heme:  negative for easy bruising and gum/nose bleeding  Musculoskel: negative for myalgias, back pain and muscle weakness  Neuro: negative for headaches, dizziness, vertigo  Psych:  negative for feelings of anxiety, depression     Objective: VITALS:   Last 24hrs VS reviewed since prior hospitalist progress note. Most recent are:  Visit Vitals    /73 (BP 1 Location: Right arm, BP Patient Position: At rest)    Pulse 86    Temp 97.8 °F (36.6 °C)    Resp 16    Ht 5' 8.5\" (1.74 m)    Wt 100.5 kg (221 lb 8 oz)    SpO2 96%    BMI 33.19 kg/m2       Intake/Output Summary (Last 24 hours) at 08/30/17 1132  Last data filed at 08/30/17 0948   Gross per 24 hour   Intake             2200 ml   Output             4555 ml   Net            -2355 ml        PHYSICAL EXAM:  General   well developed, alert, pleasant, in no acute distress  EENT  Normocephalic, Atraumatic, PERRLA, EOMI, scleral icterus  Respiratory   Clear To Auscultation bilaterally - no wheezes, rales, rhonchi, or crackles  Cardiology  Regular Rate and Rythmn  - no murmurs, rubs or gallops  Abdominal  Soft, mildly distended, non-tender, positive bowel sounds, no hepatosplenomegaly, no palpable mass  Extremities  No clubbing, cyanosis, or edema. Pulses intact. Neurological  No focal neurological deficits noted  Psychological  Oriented x 3. Normal affect. Lab Data   Recent Results (from the past 12 hour(s))   METABOLIC PANEL, BASIC    Collection Time: 08/30/17  4:37 AM   Result Value Ref Range    Sodium 141 136 - 145 mmol/L    Potassium 3.2 (L) 3.5 - 5.1 mmol/L    Chloride 107 97 - 108 mmol/L    CO2 24 21 - 32 mmol/L    Anion gap 10 5 - 15 mmol/L    Glucose 161 (H) 65 - 100 mg/dL    BUN 27 (H) 6 - 20 MG/DL    Creatinine 1.56 (H) 0.70 - 1.30 MG/DL    BUN/Creatinine ratio 17 12 - 20      GFR est AA 55 (L) >60 ml/min/1.73m2    GFR est non-AA 46 (L) >60 ml/min/1.73m2    Calcium 8.3 (L) 8.5 - 10.1 MG/DL         Medications: Reviewed    PMH/ reviewed - no change compared to H&P  Mid-Level Provider: Graeme Douglas NP   Date/Time:  8/30/2017        I have personally reviewed the history and independently examined the patient.  I have reviewed the chart and agree with the documentation recorded by the Mid Level Provider, including the assessment, treatment plan, and disposition.     Velvet Ramirez MD

## 2017-08-30 NOTE — PROGRESS NOTES
ID Progress Note  2017    Subjective:     Doing fairly well--moving out of the ICU setting    Objective:     Antibiotics:  1. Levaquin  2. Vancomycin   3. Azactam    4. Flagyl     Vitals:   Visit Vitals    /86 (BP 1 Location: Left arm, BP Patient Position: At rest)    Pulse 92    Temp 97.8 °F (36.6 °C)    Resp 16    Ht 5' 8.5\" (1.74 m)    Wt 100.5 kg (221 lb 8 oz)    SpO2 97%    BMI 33.19 kg/m2        Tmax:  Temp (24hrs), Av.2 °F (36.8 °C), Min:97.7 °F (36.5 °C), Max:99.4 °F (37.4 °C)      Exam:  Lungs:  clear to auscultation bilaterally  Heart:  regular rate and rhythm  Abdomen:  abnormal findings:  distended  Skin:  no rash or abnormalities    Labs:      Recent Labs      17   0437  17   0429  17   0404   WBC   --   10.4  11.2*   HGB   --   12.8  11.8*   PLT   --   66*  61*   BUN  27*  32*  37*   CREA  1.56*  1.52*  1.79*   SGOT   --   19  29   AP   --   252*  280*   TBILI   --   2.4*  3.8*       Cultures:     No results found for: SDES  Lab Results   Component Value Date/Time    Culture result: NO GROWTH 5 DAYS 2017 12:13 AM    Culture result: NO GROWTH 2 DAYS 2017 06:39 PM       Radiology:     Line/Insert Date:           Assessment:     1. Biliary obstruction with stenting  2. ?cholangitis  3. Leukocytosis--resolved    Objective:     1. Continue current therapy--home on cipro 500 bid and Flagyl 500 tid for 9 more days when discharged  2.  Follow up cultures--negative to date    Morelia Lai MD

## 2017-08-30 NOTE — PROGRESS NOTES
[facility] General Surgery    Subjective     No acute events. States he did not drink much yesterday due to peripheral edema. Remains afebrile. Tolerating diet, no complaints of pain, having bowel function. Objective     Patient Vitals for the past 24 hrs:   Temp Pulse Resp BP SpO2   08/30/17 0811 97.8 °F (36.6 °C) 86 16 141/73 96 %   08/30/17 0435 97.8 °F (36.6 °C) 89 16 125/79 95 %   08/29/17 2344 99.4 °F (37.4 °C) 91 16 (!) 142/92 95 %   08/29/17 2014 98.4 °F (36.9 °C) 87 16 152/86 98 %   08/29/17 1535 97.7 °F (36.5 °C) 88 16 131/88 98 %         Date 08/29/17 0700 - 08/30/17 0659 08/30/17 0700 - 08/31/17 0659   Shift 1547-4029 1177-5421 24 Hour Total 4882-6919 5230-3739 24 Hour Total   I  N  T  A  K  E   P.O.    150  150      P. O.    150  150    Shift Total  (mL/kg)    150  (1.5)  150  (1.5)   O  U  T  P  U  T   Urine  (mL/kg/hr) 2500  (2) 2275  (1.9) 4775  (2) 830  830      Urine Voided 2500 2275 4775 830  830    Stool            Stool Occurrence(s) 2 x  2 x       Shift Total  (mL/kg) 2500  (24.4) 2275  (22.6) 4775  (47.5) 830  (8.3)  830  (8.3)   NET -2500 -2275 -4775 -680  -680   Weight (kg) 102.5 100.5 100.5 100.5 100.5 100.5       PE  GEN - Awake, alert, communicating appropriately. NAD  Abd - soft, NT, ND  Ext - warm, well perfused. 1+ BLE edema. Labs  Recent Results (from the past 24 hour(s))   METABOLIC PANEL, BASIC    Collection Time: 08/30/17  4:37 AM   Result Value Ref Range    Sodium 141 136 - 145 mmol/L    Potassium 3.2 (L) 3.5 - 5.1 mmol/L    Chloride 107 97 - 108 mmol/L    CO2 24 21 - 32 mmol/L    Anion gap 10 5 - 15 mmol/L    Glucose 161 (H) 65 - 100 mg/dL    BUN 27 (H) 6 - 20 MG/DL    Creatinine 1.56 (H) 0.70 - 1.30 MG/DL    BUN/Creatinine ratio 17 12 - 20      GFR est AA 55 (L) >60 ml/min/1.73m2    GFR est non-AA 46 (L) >60 ml/min/1.73m2    Calcium 8.3 (L) 8.5 - 10.1 MG/DL       Assessment     Karina Hardin is a 62 y. o.yr old male with a biliary stricture vs neoplasm s/p ERCP with biopsies and stenting who developed post-procedure cholangitis and sepsis. 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 Plan     -Continue antibiotics per ID recommendations.   -Dispo plans per primary team.  Anticipate d/c home tomorrow.   I would like to see him about 1 week after discharge to discuss surgical plans.    -Pathology reviewed again with patient.   -I have ordered chest CT for staging for tomorrow morning.    -On for tumor board discussion this week      Clemencia East MD  8/30/2017  10:31 AM

## 2017-08-30 NOTE — PROGRESS NOTES
Hospitalist Progress Note          Canelo Montes De Oca MD  Please call  and page for questions. Call physician on-call through the  7pm-7am    Daily Progress Note: 8/30/2017    Primary care provider:None    Date of admission: 8/24/2017  2:43 PM    Admission summery and hospital course:  Patient was admitted to the intensive care unit for management of sepsis. Patient has obstructive jaundice and biliary stricture. He underwent ERCP and had his biliary stent replaced a day prior th this admission. This procedure has had fevers and hypotension.      Subjective:   Patient denied any acute issue today. Assessment/Plan:   Acute renal failure  Secondary to sepsis and ATN from hypotension. Serum creatinine elevated this AM. Patient is not drinking enough fluid, he was encouraged to drink more fluid. Will monitor BMP. Patient is hesitant for IVF due to leg swelling. Sepsis with septic shock  Sepsis secondary to possible cholangitis, post ERCP. Improving clinically. Off pressor since 8/27. ID following and currently on vanc, flagyl, aztreonem and levaquin. Follow cultures.      Abnormal LFT  Status post ERCP and stone extraction, as well as sweep biopsy. Biopsy showed adenocarcinoma. Improving bilirubin. General surgery and GI on board, continue conservative management. Tolerating advanced diet. Patient needs CT for staging prior to discharge home. To be discussed in tumor board.       Leukocytosis/thrombocytopenia: Resolving. Due to sepsis. Continue current management.      Metabolic acidosis  Secondary to sepsis and renal failure. Improving.      Hypertension: Resume his home medicine when BP is elevated.      See orders for other plans. VTE prophylaxis: Lovenox, protonix. Code status: Full  Discussed plan of care with Patient/Family and Nurse. Pre-admission lived at home. Discharge planning: pending.         Review of Systems:     Review of Systems:  Symptom  Y/N  Comments   Symptom  Y/N  Comments    Fever/Chills   n   Chest Pain  n    Poor Appetite  n    Edema  y     Cough  n   Abdominal Pain       Sputum  n   Joint Pain      SOB/VIVEROS  n   Pruritis/Rash      Nausea/vomit  n   Tolerating PT/OT      Diarrhea     Tolerating Diet      Constipation     Other      Could not obtain due to:         Objective:   Physical Exam:     Visit Vitals    /73 (BP 1 Location: Right arm, BP Patient Position: At rest)    Pulse 86    Temp 97.8 °F (36.6 °C)    Resp 16    Ht 5' 8.5\" (1.74 m)    Wt 100.5 kg (221 lb 8 oz)    SpO2 96%    BMI 33.19 kg/m2    O2 Flow Rate (L/min): 2 l/min O2 Device: Room air    Temp (24hrs), Av.2 °F (36.8 °C), Min:97.7 °F (36.5 °C), Max:99.4 °F (37.4 °C)         1901 -  0700  In: -   Out: 0931 [Urine:6775]    General:  Alert, cooperative, no distress, appears stated age. Lungs:   Clear to auscultation bilaterally. Heart:  Regular rate and rhythm, S1, S2 normal, no murmur, click, rub or gallop. Abdomen:   Soft, non-tender. Bowel sounds normal. No masses,  No organomegaly. Extremities: Extremities normal, atraumatic, no cyanosis. Edema at LEs. Skin: Skin color, texture, turgor normal. No rashes or lesions   Neurologic: CNII-XII intact.           Data Review:       Recent Days:  Recent Labs      17   04217   0404   WBC  10.4  11.2*   HGB  12.8  11.8*   HCT  36.9  34.7*   PLT  66*  61*     Recent Labs      17   0437  17   0429  17   0404   NA  141  140  139   K  3.2*  3.4*  3.4*   CL  107  110*  109*   CO2  24  23  21   GLU  161*  129*  151*   BUN  27*  32*  37*   CREA  1.56*  1.52*  1.79*   CA  8.3*  8.8  8.1*   ALB   --   2.2*  2.1*   SGOT   --   19     ALT   --   78  107*     No results for input(s): PH, PCO2, PO2, HCO3, FIO2 in the last 72 hours.     24 Hour Results:  Recent Results (from the past 24 hour(s))   METABOLIC PANEL, BASIC    Collection Time: 17  4:37 AM Result Value Ref Range    Sodium 141 136 - 145 mmol/L    Potassium 3.2 (L) 3.5 - 5.1 mmol/L    Chloride 107 97 - 108 mmol/L    CO2 24 21 - 32 mmol/L    Anion gap 10 5 - 15 mmol/L    Glucose 161 (H) 65 - 100 mg/dL    BUN 27 (H) 6 - 20 MG/DL    Creatinine 1.56 (H) 0.70 - 1.30 MG/DL    BUN/Creatinine ratio 17 12 - 20      GFR est AA 55 (L) >60 ml/min/1.73m2    GFR est non-AA 46 (L) >60 ml/min/1.73m2    Calcium 8.3 (L) 8.5 - 10.1 MG/DL       Problem List:  Problem List as of 8/30/2017  Date Reviewed: 8/24/2017          Codes Class Noted - Resolved    Sepsis ([facility] [address] ICD-10-CM: A41.9  ICD-9-CM: 038.9, 995.91  8/25/2017 - Present        Common bile duct (CBD) obstruction ICD-10-CM: K83.1  ICD-9-CM: 576.2  7/25/2017 - Present        UTI (urinary tract infection) ICD-10-CM: N39.0  ICD-9-CM: 599.0  7/25/2017 - Present        Obstructive jaundice ICD-10-CM: K83.8  ICD-9-CM: 576.8  7/23/2017 - Present              Medications reviewed  Current Facility-Administered Medications   Medication Dose Route Frequency    potassium chloride SR (KLOR-CON 10) tablet 20 mEq  20 mEq Oral BID    enoxaparin (LOVENOX) injection 40 mg  40 mg SubCUTAneous Q24H    pantoprazole (PROTONIX) tablet 40 mg  40 mg Oral ACB    levoFLOXacin (LEVAQUIN) 750 mg in D5W IVPB  750 mg IntraVENous Q24H    vancomycin (VANCOCIN) 1250 mg in  ml infusion  1,250 mg IntraVENous Q18H    ondansetron (ZOFRAN) injection 4 mg  4 mg IntraVENous Q4H PRN    prochlorperazine (COMPAZINE) injection 5 mg  5 mg IntraVENous Q6H PRN    aztreonam (AZACTAM) 2 g in 0.9% sodium chloride (MBP/ADV) 100 mL  2 g IntraVENous Q8H    albuterol-ipratropium (DUO-NEB) 2.5 MG-0.5 MG/3 ML  3 mL Nebulization Q4H PRN    docusate sodium (COLACE) capsule 100 mg  100 mg Oral BID    sodium chloride (NS) flush 5-10 mL  5-10 mL IntraVENous PRN    VAncomycin Pharmacy Dosing   Other Rx Dosing/Monitoring    sodium chloride (NS) flush 10-30 mL  10-30 mL InterCATHeter PRN    sodium chloride (NS) flush 10 mL  10 mL InterCATHeter Q24H    sodium chloride (NS) flush 10-40 mL  10-40 mL InterCATHeter Q8H    alteplase (CATHFLO) 1 mg in sterile water (preservative free) 1 mL injection  1 mg InterCATHeter PRN    bacitracin 500 unit/gram packet 1 Packet  1 Packet Topical PRN    acetaminophen (TYLENOL) tablet 650 mg  650 mg Oral Q6H PRN    gabapentin (NEURONTIN) capsule 900 mg  900 mg Oral BID    sodium chloride (NS) flush 5-10 mL  5-10 mL IntraVENous Q8H    sodium chloride (NS) flush 5-10 mL  5-10 mL IntraVENous PRN    metroNIDAZOLE (FLAGYL) IVPB premix 500 mg  500 mg IntraVENous Q8H    HYDROmorphone (PF) (DILAUDID) injection 1 mg  1 mg IntraVENous Q4H PRN    diphenhydrAMINE (BENADRYL) injection 12.5 mg  12.5 mg IntraVENous Q6H PRN    dexamethasone (DECADRON) 4 mg/mL injection 4 mg  4 mg IntraVENous Q6H PRN       Care Plan discussed with: Patient/Family, Nurse and     Total time spent with patient: 30 minutes.     Calos Carpio MD

## 2017-08-31 ENCOUNTER — PATIENT OUTREACH (OUTPATIENT)
Dept: OTHER | Age: 62
End: 2017-08-31

## 2017-08-31 ENCOUNTER — APPOINTMENT (OUTPATIENT)
Dept: CT IMAGING | Age: 62
DRG: 871 | End: 2017-08-31
Attending: SURGERY
Payer: COMMERCIAL

## 2017-08-31 VITALS
BODY MASS INDEX: 32.7 KG/M2 | WEIGHT: 220.8 LBS | HEART RATE: 86 BPM | SYSTOLIC BLOOD PRESSURE: 126 MMHG | RESPIRATION RATE: 18 BRPM | TEMPERATURE: 98 F | DIASTOLIC BLOOD PRESSURE: 70 MMHG | HEIGHT: 69 IN | OXYGEN SATURATION: 96 %

## 2017-08-31 PROBLEM — A41.9 SEPSIS (HCC): Status: RESOLVED | Noted: 2017-08-25 | Resolved: 2017-08-31

## 2017-08-31 LAB
ALBUMIN SERPL-MCNC: 2.3 G/DL (ref 3.5–5)
ALBUMIN/GLOB SERPL: 0.8 {RATIO} (ref 1.1–2.2)
ALP SERPL-CCNC: 188 U/L (ref 45–117)
ALT SERPL-CCNC: 49 U/L (ref 12–78)
ANION GAP SERPL CALC-SCNC: 10 MMOL/L (ref 5–15)
AST SERPL-CCNC: 16 U/L (ref 15–37)
BASOPHILS # BLD: 0 K/UL (ref 0–0.1)
BASOPHILS # BLD: 0.1 K/UL (ref 0–0.1)
BASOPHILS NFR BLD: 0 % (ref 0–1)
BASOPHILS NFR BLD: 1 % (ref 0–1)
BILIRUB SERPL-MCNC: 1.7 MG/DL (ref 0.2–1)
BUN SERPL-MCNC: 25 MG/DL (ref 6–20)
BUN/CREAT SERPL: 17 (ref 12–20)
CALCIUM SERPL-MCNC: 8.8 MG/DL (ref 8.5–10.1)
CHLORIDE SERPL-SCNC: 106 MMOL/L (ref 97–108)
CO2 SERPL-SCNC: 26 MMOL/L (ref 21–32)
CREAT SERPL-MCNC: 1.44 MG/DL (ref 0.7–1.3)
DIFFERENTIAL METHOD BLD: ABNORMAL
DIFFERENTIAL METHOD BLD: ABNORMAL
EOSINOPHIL # BLD: 0.5 K/UL (ref 0–0.4)
EOSINOPHIL # BLD: 0.7 K/UL (ref 0–0.4)
EOSINOPHIL NFR BLD: 4 % (ref 0–7)
EOSINOPHIL NFR BLD: 5 % (ref 0–7)
ERYTHROCYTE [DISTWIDTH] IN BLOOD BY AUTOMATED COUNT: 14.8 % (ref 11.5–14.5)
ERYTHROCYTE [DISTWIDTH] IN BLOOD BY AUTOMATED COUNT: 15 % (ref 11.5–14.5)
GLOBULIN SER CALC-MCNC: 2.9 G/DL (ref 2–4)
GLUCOSE SERPL-MCNC: 153 MG/DL (ref 65–100)
HCT VFR BLD AUTO: 38.5 % (ref 36.6–50.3)
HCT VFR BLD AUTO: 38.7 % (ref 36.6–50.3)
HGB BLD-MCNC: 13.1 G/DL (ref 12.1–17)
HGB BLD-MCNC: 13.2 G/DL (ref 12.1–17)
LYMPHOCYTES # BLD: 1 K/UL (ref 0.8–3.5)
LYMPHOCYTES # BLD: 1.5 K/UL (ref 0.8–3.5)
LYMPHOCYTES NFR BLD: 10 % (ref 12–49)
LYMPHOCYTES NFR BLD: 8 % (ref 12–49)
MCH RBC QN AUTO: 30.3 PG (ref 26–34)
MCH RBC QN AUTO: 30.6 PG (ref 26–34)
MCHC RBC AUTO-ENTMCNC: 33.9 G/DL (ref 30–36.5)
MCHC RBC AUTO-ENTMCNC: 34.3 G/DL (ref 30–36.5)
MCV RBC AUTO: 89.1 FL (ref 80–99)
MCV RBC AUTO: 89.6 FL (ref 80–99)
METAMYELOCYTES NFR BLD MANUAL: 2 %
METAMYELOCYTES NFR BLD MANUAL: 6 %
MONOCYTES # BLD: 0.8 K/UL (ref 0–1)
MONOCYTES # BLD: 1.3 K/UL (ref 0–1)
MONOCYTES NFR BLD: 6 % (ref 5–13)
MONOCYTES NFR BLD: 9 % (ref 5–13)
MYELOCYTES NFR BLD MANUAL: 1 %
MYELOCYTES NFR BLD MANUAL: 1 %
NEUTS BAND NFR BLD MANUAL: 6 %
NEUTS BAND NFR BLD MANUAL: 6 % (ref 0–6)
NEUTS SEG # BLD: 10.7 K/UL (ref 1.8–8)
NEUTS SEG # BLD: 9.8 K/UL (ref 1.8–8)
NEUTS SEG NFR BLD: 66 % (ref 32–75)
NEUTS SEG NFR BLD: 69 % (ref 32–75)
PLATELET # BLD AUTO: 107 K/UL (ref 150–400)
PLATELET # BLD AUTO: 96 K/UL (ref 150–400)
PLATELET COMMENTS,PCOM: ABNORMAL
POTASSIUM SERPL-SCNC: 3.5 MMOL/L (ref 3.5–5.1)
PROT SERPL-MCNC: 5.2 G/DL (ref 6.4–8.2)
RBC # BLD AUTO: 4.32 M/UL (ref 4.1–5.7)
RBC # BLD AUTO: 4.32 M/UL (ref 4.1–5.7)
RBC MORPH BLD: ABNORMAL
RBC MORPH BLD: ABNORMAL
SODIUM SERPL-SCNC: 142 MMOL/L (ref 136–145)
WBC # BLD AUTO: 13.1 K/UL (ref 4.1–11.1)
WBC # BLD AUTO: 14.9 K/UL (ref 4.1–11.1)
WBC MORPH BLD: ABNORMAL
WBC MORPH BLD: ABNORMAL

## 2017-08-31 PROCEDURE — 74011000250 HC RX REV CODE- 250: Performed by: INTERNAL MEDICINE

## 2017-08-31 PROCEDURE — 74011250636 HC RX REV CODE- 250/636: Performed by: FAMILY MEDICINE

## 2017-08-31 PROCEDURE — 71260 CT THORAX DX C+: CPT

## 2017-08-31 PROCEDURE — 74011000250 HC RX REV CODE- 250: Performed by: FAMILY MEDICINE

## 2017-08-31 PROCEDURE — 74011636320 HC RX REV CODE- 636/320: Performed by: FAMILY MEDICINE

## 2017-08-31 PROCEDURE — 80053 COMPREHEN METABOLIC PANEL: CPT | Performed by: FAMILY MEDICINE

## 2017-08-31 PROCEDURE — 74011000258 HC RX REV CODE- 258: Performed by: FAMILY MEDICINE

## 2017-08-31 PROCEDURE — 74011250637 HC RX REV CODE- 250/637: Performed by: FAMILY MEDICINE

## 2017-08-31 PROCEDURE — 74011250637 HC RX REV CODE- 250/637: Performed by: SPECIALIST

## 2017-08-31 PROCEDURE — 74011000258 HC RX REV CODE- 258: Performed by: INTERNAL MEDICINE

## 2017-08-31 PROCEDURE — 85025 COMPLETE CBC W/AUTO DIFF WBC: CPT | Performed by: FAMILY MEDICINE

## 2017-08-31 PROCEDURE — 36415 COLL VENOUS BLD VENIPUNCTURE: CPT | Performed by: FAMILY MEDICINE

## 2017-08-31 PROCEDURE — 74011250636 HC RX REV CODE- 250/636: Performed by: HOSPITALIST

## 2017-08-31 RX ORDER — METRONIDAZOLE 500 MG/1
500 TABLET ORAL 3 TIMES DAILY
Qty: 30 TAB | Refills: 0 | Status: SHIPPED | OUTPATIENT
Start: 2017-08-31 | End: 2017-09-29

## 2017-08-31 RX ORDER — SODIUM CHLORIDE 0.9 % (FLUSH) 0.9 %
10 SYRINGE (ML) INJECTION
Status: COMPLETED | OUTPATIENT
Start: 2017-08-31 | End: 2017-08-31

## 2017-08-31 RX ORDER — ACETAMINOPHEN 325 MG/1
650 TABLET ORAL
Qty: 30 TAB | Refills: 0 | Status: SHIPPED | OUTPATIENT
Start: 2017-08-31 | End: 2017-09-29

## 2017-08-31 RX ORDER — DOCUSATE SODIUM 100 MG/1
100 CAPSULE, LIQUID FILLED ORAL 2 TIMES DAILY
Qty: 60 CAP | Refills: 0 | Status: SHIPPED | OUTPATIENT
Start: 2017-08-31 | End: 2017-09-29

## 2017-08-31 RX ORDER — CIPROFLOXACIN 500 MG/1
500 TABLET ORAL 2 TIMES DAILY
Qty: 20 TAB | Refills: 0 | Status: SHIPPED | OUTPATIENT
Start: 2017-08-31 | End: 2017-09-10

## 2017-08-31 RX ADMIN — AZTREONAM 2 G: 2 INJECTION, POWDER, LYOPHILIZED, FOR SOLUTION INTRAMUSCULAR; INTRAVENOUS at 05:27

## 2017-08-31 RX ADMIN — GABAPENTIN 900 MG: 300 CAPSULE ORAL at 08:37

## 2017-08-31 RX ADMIN — Medication 10 ML: at 07:19

## 2017-08-31 RX ADMIN — PANTOPRAZOLE SODIUM 40 MG: 40 TABLET, DELAYED RELEASE ORAL at 07:49

## 2017-08-31 RX ADMIN — METRONIDAZOLE 500 MG: 500 INJECTION, SOLUTION INTRAVENOUS at 08:00

## 2017-08-31 RX ADMIN — IOPAMIDOL 100 ML: 612 INJECTION, SOLUTION INTRAVENOUS at 07:19

## 2017-08-31 RX ADMIN — ENOXAPARIN SODIUM 40 MG: 40 INJECTION SUBCUTANEOUS at 10:45

## 2017-08-31 RX ADMIN — VANCOMYCIN HYDROCHLORIDE 1250 MG: 10 INJECTION, POWDER, LYOPHILIZED, FOR SOLUTION INTRAVENOUS at 10:50

## 2017-08-31 RX ADMIN — SODIUM CHLORIDE 100 ML: 900 INJECTION, SOLUTION INTRAVENOUS at 07:19

## 2017-08-31 RX ADMIN — POTASSIUM CHLORIDE 20 MEQ: 750 TABLET, FILM COATED, EXTENDED RELEASE ORAL at 08:37

## 2017-08-31 NOTE — PROGRESS NOTES
[facility] follow up. Patient remains inpatient with sepsis 08/25- transfx out of ICU to floor is planned today. Mr. Debra Hennessy had previously given me permission to speak with his wife, who is a RN instructor in nursing school. Attempted to contact patient's for transitions of care services to offer support, and to anticipate needs for discharge. Left discreet message on voicemail with this CM contact information. Will follow for [facility] services.

## 2017-08-31 NOTE — DISCHARGE SUMMARY
Discharge Summary       PATIENT ID: Ras Camargo  MRN: [id number]   YOB: 1955    DATE OF ADMISSION: 8/24/2017  2:43 PM    DATE OF DISCHARGE:   PRIMARY CARE PROVIDER: None       DISCHARGING PHYSICIAN: Christy Barbosa MD    To contact this individual call [phone] and ask the  to page. If unavailable ask to be transferred the Adult Hospitalist Department. CONSULTATIONS: IP CONSULT TO GENERAL SURGERY  IP CONSULT TO INFECTIOUS DISEASES    PROCEDURES/SURGERIES: Procedure(s):  ENDOSCOPIC RETROGRADE CHOLANGIOPANCREATOGRAPHY DIRECT VISUALIZATION  ENDOSCOPIC RETROGRADE CHOLANGIOPANCREATOGRAPHY  ENDOSCOPY WITH PROSTHESIS OR STENT REMOVAL  ESOPHAGOGASTRODUODENAL (EGD) BIOPSY  ENDOSCOPIC STONE EXTRACTION/BALLOON SWEEP  BILIARY STENT PLACEMENT    ADMITTING [address] [place] [address] COURSE:   Patient was admitted to the intensive care unit for management of sepsis. Patient has obstructive jaundice and biliary stricture. He underwent ERCP and had his biliary stent replaced a day prior th this admission.  This procedure has had fevers and hypotension.      DISCHARGE DIAGNOSES / PLAN:      Acute renal failure  Secondary to sepsis and ATN from hypotension. Serum creatinine is improving. patient was encouraged to drink more fluid. Due to the contrast he received this AM an leukocytosis this AM, I offered the patient and his wife for the repeat lab at AM. They would like to go home will get lab test as an outpatient with their PCP.       Sepsis with septic shock  Sepsis secondary to possible cholangitis, post ERCP. Improving clinically. Off pressor since 8/27. ID following and currently on vanc, flagyl, aztreonem and levaquin. Mild leukocytosis, repeat lab was better. Patient denied any issues including CP, SOB, N/V, abdominal pain, cough and dysuria. Leukocytosis related to ? Malignancy. I discussed with Dr Kayla Campo.  He is comfortable for the patient to go home with Cipro and flagyl and follow up lab at AM. Patient and patient were explained about it, and they are comfortable to go home.       Abnormal LFT  Status post ERCP and stone extraction, as well as sweep biopsy. Biopsy showed adenocarcinoma.  Improving bilirubin. General surgery and GI on board, continue conservative management. Tolerating advanced diet. Patient needs CT for staging prior to discharge home. To be discussed in tumor board.       Leukocytosis/thrombocytopenia: Resolving. Due to sepsis. Continue current management.      Metabolic acidosis  Secondary to sepsis and renal failure. Improving.      Hypertension: BP is reasonable without medicines.        See orders for other plans. VTE prophylaxis: Lovenox, protonix. Code status: Full  Discussed plan of care with Patient/Family and Nurse. Pre-admission lived at home. Discharge planning: To home today with wife. PENDING TEST RESULTS:   At the time of discharge the following test results are still pending: NA    FOLLOW UP APPOINTMENTS:    Follow-up Information     Follow up With Details Comments Contact Info    None   None ([phone]) Patient stated that they have no PCP                   DISCHARGE MEDICATIONS:  Current Discharge Medication List      START taking these medications    Details   acetaminophen (TYLENOL) 325 mg tablet Take 2 Tabs by mouth every six (6) hours as needed for Fever. Qty: 30 Tab, Refills: 0      docusate sodium (COLACE) 100 mg capsule Take 1 Cap by mouth two (2) times a day for 30 days. Qty: 60 Cap, Refills: 0      ciprofloxacin HCl (CIPRO) 500 mg tablet Take 1 Tab by mouth two (2) times a day for 10 days. Qty: 20 Tab, Refills: 0      metroNIDAZOLE (FLAGYL) 500 mg tablet Take 1 Tab by mouth three (3) times daily. Qty: 30 Tab, Refills: 0         CONTINUE these medications which have NOT CHANGED    Details   gabapentin (NEURONTIN) 300 mg capsule Take 900 mg by mouth two (2) times a day.  3 x 300mg caps (900mg) twice daily      cholecalciferol (VITAMIN D3) 1,000 unit cap Take 1,000 Units by mouth daily. cyanocobalamin (VITAMIN B-12) 1,000 mcg/mL injection 1,000 mcg by IntraMUSCular route once. Indications: VITAMIN B12 DEFICIENCY, Twice a month         STOP taking these medications       ramipril (ALTACE) 10 mg capsule Comments:   Reason for Stopping:         cholestyramine-sucrose (QUESTRAN) 4 gram powder Comments:   Reason for Stopping:                 NOTIFY YOUR PHYSICIAN FOR ANY OF THE FOLLOWING:   Fever over 101 degrees for 24 hours. Chest pain, shortness of breath, fever, chills, nausea, vomiting, diarrhea, change in mentation, falling, weakness, bleeding. Severe pain or pain not relieved by medications. Or, any other signs or symptoms that you may have questions about. DISPOSITION:   X Home With:   OT  PT  HH  RN       Long term SNF/Inpatient Rehab    Independent/assisted living    Hospice    Other:       PATIENT CONDITION AT DISCHARGE:     Functional status    Poor     Deconditioned    X Independent      Cognition    X Lucid     Forgetful     Dementia      Catheters/lines (plus indication)    Vega     PICC     PEG    X None      Code status   X  Full code     DNR      PHYSICAL EXAMINATION AT DISCHARGE:    General:  Alert, cooperative, no distress, appears stated age. Walking in the hallway with his wife. Lungs:   Clear to auscultation bilaterally. Heart:  Regular rate and rhythm, S1, S2 normal, no murmur, click, rub or gallop. Abdomen:   Soft, non-tender. Bowel sounds normal.    Extremities: Extremities normal, atraumatic, no cyanosis. Edema at LEs, improving.    Skin: Skin color, texture, turgor normal. No rashes or lesions   Neurologic: CNII-XII intact.               CHRONIC MEDICAL DIAGNOSES:  Problem List as of 8/31/2017  Date Reviewed: 8/31/2017          Codes Class Noted - Resolved    Common bile duct (CBD) obstruction ICD-10-CM: K83.1  ICD-9-CM: 576.2  7/25/2017 - Present        UTI (urinary tract infection) ICD-10-CM: N39.0  ICD-9-CM: 599.0  7/25/2017 - Present        Obstructive jaundice ICD-10-CM: K83.8  ICD-9-CM: 576.8  7/23/2017 - Present        RESOLVED: [facility] ICD-10-CM: A41.9  ICD-9-CM: 038.9, 995.91  8/25/2017 - 8/31/2017              Greater than 40 minutes were spent with the patient on counseling and coordination of care    Signed:   Tim Lang MD  8/31/2017  2:18 PM

## 2017-08-31 NOTE — PROGRESS NOTES
[facility] General Surgery    Subjective     No acute events. No complaints today. Wants to go home. Remains afebrile. Tolerating diet, no complaints of pain, having bowel function. Denies any respiratory complaints. Objective     Patient Vitals for the past 24 hrs:   Temp Pulse Resp BP SpO2   08/31/17 1229 98 °F (36.7 °C) 86 18 126/70 96 %   08/31/17 0830 98 °F (36.7 °C) 88 18 (!) 151/98 95 %   08/30/17 2155 98.8 °F (37.1 °C) 89 18 137/89 97 %   08/30/17 1722 98.5 °F (36.9 °C) 69 18 126/72 95 %   08/30/17 1532 97.4 °F (36.3 °C) 90 16 143/84 97 %   08/30/17 1447 97.8 °F (36.6 °C) 92 16 141/86 97 %         Date 08/30/17 0700 - 08/31/17 0659 08/31/17 0700 - 09/01/17 0659   Shift 9011-9131 7747-1776 24 Hour Total 5118-4171 4694-6122 24 Hour Total   I  N  T  A  K  E   P.O. 770  770         P. O. 770  770       I.V.  (mL/kg/hr) 2050  (1.7) 800  (0.7) 2850  (1.2)         Volume (metroNIDAZOLE (FLAGYL) IVPB premix 500 mg) 500 200 700         Volume (vancomycin (VANCOCIN) 1250 mg in  ml infusion)          Volume (aztreonam (AZACTAM) 2 g in 0.9% sodium chloride (MBP/ADV) 100 mL) 500 200 700         Volume (levoFLOXacin (LEVAQUIN) 750 mg in D5W IVPB) 300 150 450       Shift Total  (mL/kg) 2820  (28.1) 800  (8) 3620  (36)      O  U  T  P  U  T   Urine  (mL/kg/hr) 1230  (1) 3400  (2.8) 4630  (1.9) 775  775      Urine Voided 1230 3400 4630 775  775      Urine Occurrence(s) 2 x  2 x       Shift Total  (mL/kg) 1230  (12.2) 3400  (33.8) 4630  (46.1) 775  (7.7)  775  (7.7)   NET 1253 -0540 -1010 -775  -775   Weight (kg) 100.5 100.5 100.5 100.2 100.2 100.2       PE  GEN - Awake, alert, communicating appropriately. NAD  Abd - soft, NT, ND  Ext - warm, well perfused. trace BLE edema.       Labs  Recent Results (from the past 24 hour(s))   CBC WITH AUTOMATED DIFF    Collection Time: 08/31/17  4:14 AM   Result Value Ref Range    WBC 14.9 (H) 4.1 - 11.1 K/uL RBC 4.32 4.10 - 5.70 M/uL    HGB 13.2 12.1 - 17.0 g/dL    HCT 38.5 36.6 - 50.3 %    MCV 89.1 80.0 - 99.0 FL    MCH 30.6 26.0 - 34.0 PG    MCHC 34.3 30.0 - 36.5 g/dL    RDW 14.8 (H) 11.5 - 14.5 %    PLATELET 96 (L) 865 - 400 K/uL    NEUTROPHILS 66 32 - 75 %    BAND NEUTROPHILS 6 %    LYMPHOCYTES 10 (L) 12 - 49 %    MONOCYTES 9 5 - 13 %    EOSINOPHILS 5 0 - 7 %    BASOPHILS 1 0 - 1 %    METAMYELOCYTES 2 %    MYELOCYTES 1 %    ABS. NEUTROPHILS 10.7 (H) 1.8 - 8.0 K/UL    ABS. LYMPHOCYTES 1.5 0.8 - 3.5 K/UL    ABS. MONOCYTES 1.3 (H) 0.0 - 1.0 K/UL    ABS. EOSINOPHILS 0.7 (H) 0.0 - 0.4 K/UL    ABS. BASOPHILS 0.1 0.0 - 0.1 K/UL    DF MANUAL      PLATELET COMMENTS LARGE PLATELETS      RBC COMMENTS NORMOCYTIC, NORMOCHROMIC      WBC COMMENTS REACTIVE LYMPHS     METABOLIC PANEL, COMPREHENSIVE    Collection Time: 08/31/17  4:14 AM   Result Value Ref Range    Sodium 142 136 - 145 mmol/L    Potassium 3.5 3.5 - 5.1 mmol/L    Chloride 106 97 - 108 mmol/L    CO2 26 21 - 32 mmol/L    Anion gap 10 5 - 15 mmol/L    Glucose 153 (H) 65 - 100 mg/dL    BUN 25 (H) 6 - 20 MG/DL    Creatinine 1.44 (H) 0.70 - 1.30 MG/DL    BUN/Creatinine ratio 17 12 - 20      GFR est AA >60 >60 ml/min/1.73m2    GFR est non-AA 50 (L) >60 ml/min/1.73m2    Calcium 8.8 8.5 - 10.1 MG/DL    Bilirubin, total 1.7 (H) 0.2 - 1.0 MG/DL    ALT (SGPT) 49 12 - 78 U/L    AST (SGOT) 16 15 - 37 U/L    Alk.  phosphatase 188 (H) 45 - 117 U/L    Protein, total 5.2 (L) 6.4 - 8.2 g/dL    Albumin 2.3 (L) 3.5 - 5.0 g/dL    Globulin 2.9 2.0 - 4.0 g/dL    A-G Ratio 0.8 (L) 1.1 - 2.2     CBC WITH AUTOMATED DIFF    Collection Time: 08/31/17 10:33 AM   Result Value Ref Range    WBC 13.1 (H) 4.1 - 11.1 K/uL    RBC 4.32 4.10 - 5.70 M/uL    HGB 13.1 12.1 - 17.0 g/dL    HCT 38.7 36.6 - 50.3 %    MCV 89.6 80.0 - 99.0 FL    MCH 30.3 26.0 - 34.0 PG    MCHC 33.9 30.0 - 36.5 g/dL    RDW 15.0 (H) 11.5 - 14.5 %    PLATELET 741 (L) 937 - 400 K/uL    NEUTROPHILS 69 32 - 75 %    BAND NEUTROPHILS 6 0 - 6 %    LYMPHOCYTES 8 (L) 12 - 49 %    MONOCYTES 6 5 - 13 %    EOSINOPHILS 4 0 - 7 %    BASOPHILS 0 0 - 1 %    METAMYELOCYTES 6 (H) 0 %    MYELOCYTES 1 (H) 0 %    ABS. NEUTROPHILS 9.8 (H) 1.8 - 8.0 K/UL    ABS. LYMPHOCYTES 1.0 0.8 - 3.5 K/UL    ABS. MONOCYTES 0.8 0.0 - 1.0 K/UL    ABS. EOSINOPHILS 0.5 (H) 0.0 - 0.4 K/UL    ABS. BASOPHILS 0.0 0.0 - 0.1 K/UL    DF MANUAL      RBC COMMENTS NORMOCYTIC, NORMOCHROMIC      WBC COMMENTS TOXIC GRANULATION         Assessment     Haydee Gonsalez is a 62 y. o.yr old male with a biliary stricture vs neoplasm s/p ERCP with biopsies and stenting who developed post-procedure cholangitis and sepsis. 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 Plan     -Antibiotics for cholangitis per ID recommendations. Unclear why WBC went up today. -CT chest reviewed. No obvious metastatic disease. Effusion is likely reactive secondary to cholangitis.   -Dispo plans per primary team. I would like to see him about 1 week after discharge to discuss surgical plans.    -Planning for whipple with extrahepatic bile duct resection based upon tumor board discussion today once he is fully recovered from this episode.         Tania Thurman MD  8/31/2017  1:31 PM

## 2017-08-31 NOTE — DISCHARGE INSTRUCTIONS
Discharge Instructions       PATIENT ID: Nasreen Garcia  MRN: [id number]   YOB: 1955    DATE OF ADMISSION: 8/24/2017  2:43 PM    DATE OF DISCHARGE: 8/31/2017    PRIMARY CARE PROVIDER: None       DISCHARGING PHYSICIAN: Aniket Ospina MD    To contact this individual call [phone] and ask the  to page. If unavailable ask to be transferred the Adult Hospitalist Department. DISCHARGE DIAGNOSES Common bile duct adenocarcinoma, cholangitis. CONSULTATIONS: IP CONSULT TO GENERAL SURGERY  IP CONSULT TO INFECTIOUS DISEASES    PROCEDURES/SURGERIES: Procedure(s):  ENDOSCOPIC RETROGRADE CHOLANGIOPANCREATOGRAPHY DIRECT VISUALIZATION  ENDOSCOPIC RETROGRADE CHOLANGIOPANCREATOGRAPHY  ENDOSCOPY WITH PROSTHESIS OR STENT REMOVAL  ESOPHAGOGASTRODUODENAL (EGD) BIOPSY  ENDOSCOPIC STONE EXTRACTION/BALLOON SWEEP  BILIARY STENT PLACEMENT    PENDING TEST RESULTS:   At the time of discharge the following test results are still pending: NA    FOLLOW UP APPOINTMENTS:   Follow-up Information     Follow up With Details Comments Contact Info    None   None ([phone]) Patient stated that they have no PCP             ADDITIONAL CARE RECOMMENDATIONS: NA  Please follow up with your primary care provider in 1 week. Please repeat CBC and BMP tomorrow on 09/01/2017. Please follow up with Surgeon as you have been suggested. DIET: Cardiac Diet and Renal Diet    ACTIVITY: Activity as tolerated        DISCHARGE MEDICATIONS:   See Medication Reconciliation Form    · It is important that you take the medication exactly as they are prescribed. · Keep your medication in the bottles provided by the pharmacist and keep a list of the medication names, dosages, and times to be taken in your wallet. · Do not take other medications without consulting your doctor. NOTIFY YOUR PHYSICIAN FOR ANY OF THE FOLLOWING:   Fever over 101 degrees for 24 hours.    Chest pain, shortness of breath, fever, chills, nausea, vomiting, diarrhea, change in mentation, falling, weakness, bleeding. Severe pain or pain not relieved by medications. Or, any other signs or symptoms that you may have questions about. DISPOSITION:   X Home With:   OT  PT  HH  RN       SNF/Inpatient Rehab/LTAC    Independent/assisted living    Hospice    Other:     CDMP Checked:    Yes X       Signed:   Magdiel Harmon MD  8/31/2017  2:15 PM

## 2017-09-01 ENCOUNTER — PATIENT OUTREACH (OUTPATIENT)
Dept: OTHER | Age: 62
End: 2017-09-01

## 2017-09-01 NOTE — PROGRESS NOTES
[facility] follow up. Patient on with Readmission for sepsis following stay for obstructive jaundice. Most recent admission was 08/24 - 08/31 at [address] [place] [address]. Chart review: See med changes below for discharge    Attempted to contact patient for transitions of care services. Left discreet message on voicemail with this CM contact information. Noted his wife is RN instructor for [address] [employer] and previous contact, Mr. Zuhair Coreas gave release to speak with his wife. Will follow for [facility] services. Next Attempt 09/05      Chart Review:    DISCHARGE MEDICATIONS:        Current Discharge Medication List             START taking these medications     Details   acetaminophen (TYLENOL) 325 mg tablet Take 2 Tabs by mouth every six (6) hours as needed for Fever. Qty: 30 Tab, Refills: 0       docusate sodium (COLACE) 100 mg capsule Take 1 Cap by mouth two (2) times a day for 30 days. Qty: 60 Cap, Refills: 0       ciprofloxacin HCl (CIPRO) 500 mg tablet Take 1 Tab by mouth two (2) times a day for 10 days. Qty: 20 Tab, Refills: 0       metroNIDAZOLE (FLAGYL) 500 mg tablet Take 1 Tab by mouth three (3) times daily.   Qty: 30 Tab, Refills: 0

## 2017-09-05 ENCOUNTER — PATIENT OUTREACH (OUTPATIENT)
Dept: OTHER | Age: 62
End: 2017-09-05

## 2017-09-05 ENCOUNTER — OFFICE VISIT (OUTPATIENT)
Dept: SURGERY | Age: 62
End: 2017-09-05

## 2017-09-05 VITALS
SYSTOLIC BLOOD PRESSURE: 142 MMHG | HEART RATE: 92 BPM | OXYGEN SATURATION: 99 % | HEIGHT: 69 IN | BODY MASS INDEX: 28.88 KG/M2 | TEMPERATURE: 98.1 F | RESPIRATION RATE: 18 BRPM | DIASTOLIC BLOOD PRESSURE: 80 MMHG | WEIGHT: 195 LBS

## 2017-09-05 DIAGNOSIS — C22.1 CHOLANGIOCARCINOMA OF BILIARY TRACT (HCC): Primary | ICD-10-CM

## 2017-09-05 NOTE — MR AVS SNAPSHOT
Visit Information Date & Time Provider Department Dept. Phone Encounter #  
 9/5/2017  4:00 PM Eric Renee [phone] [id number] Your Appointments 10/16/2017  9:30 AM  
PHYSICAL PRE OP with Vannesa Velarde III, DO DHEERAJ [facility] [address]) Appt Note: np est pcp; phone attempt to r/s appt. Chantell Pantoja Full/PJ 8/2/17; np est pcp; Appt reminder attempt /Mailbox Full/PJ 8/30/17; nNew pt est PCP  
 [facility] [address] [id number]  
[address] [facility] [address] [place] [address] Upcoming Health Maintenance Date Due Hepatitis C Screening 1955 DTaP/Tdap/Td series (1 - Tdap) 9/26/1977 FOBT Q 1 YEAR AGE 50-75 9/26/2006 ZOSTER VACCINE AGE 60> 7/26/2016 INFLUENZA AGE 9 TO ADULT 8/1/2017 Allergies as of 9/5/2017  Review Complete On: 9/5/2017 By: Yane Sotelo Severity Noted Reaction Type Reactions Pcn [Penicillins]  07/23/2017    Other (comments) Pt stated \"always been told PCN\" Current Immunizations  Never Reviewed No immunizations on file. Not reviewed this visit You Were Diagnosed With   
  
 Codes Comments Cholangiocarcinoma of biliary tract ([facility]    -  Primary ICD-10-CM: C24.9 ICD-9-CM: 156.9 Vitals BP Pulse Temp Resp Height(growth percentile) Weight(growth percentile) 142/80 (BP 1 Location: Left arm, BP Patient Position: Sitting) 92 98.1 °F (36.7 °C) (Oral) 18 5' 8.5\" (1.74 m) 195 lb (88.5 kg) SpO2 BMI Smoking Status 99% 29.22 kg/m2 Never Smoker Vitals History BMI and BSA Data Body Mass Index Body Surface Area  
 29.22 kg/m 2 2.07 m 2 Your Updated Medication List  
  
   
This list is accurate as of: 9/5/17 11:59 PM.  Always use your most recent med list.  
  
  
  
  
 acetaminophen 325 mg tablet Commonly known as:  TYLENOL  
 Take 2 Tabs by mouth every six (6) hours as needed for Fever. ciprofloxacin HCl 500 mg tablet Commonly known as:  CIPRO Take 1 Tab by mouth two (2) times a day for 10 days. docusate sodium 100 mg capsule Commonly known as:  [place] Petrin Take 1 Cap by mouth two (2) times a day for 30 days. gabapentin 300 mg capsule Commonly known as:  NEURONTIN Take 900 mg by mouth two (2) times a day. 3 x 300mg caps (900mg) twice daily  
  
 metroNIDAZOLE 500 mg tablet Commonly known as:  FLAGYL Take 1 Tab by mouth three (3) times daily. VITAMIN B-12 1,000 mcg/mL injection Generic drug:  cyanocobalamin  
1,000 mcg by IntraMUSCular route once. Indications: VITAMIN B12 DEFICIENCY, Twice a month VITAMIN D3 1,000 unit Cap Generic drug:  cholecalciferol Take 1,000 Units by mouth daily. To-Do List   
 09/29/2017 9:00 AM  
  Appointment with [facility] PAT EXAM RM 6 at [facility] ([phone]) Introducing [facility]! Karin Samuels introduces [employer] patient portal. Now you can access parts of your medical record, email your doctor's office, and request medication refills online. 1. In your internet browser, go to [email or web address] 2. Click on the First Time User? Click Here link in the Sign In box. You will see the New Member Sign Up page. 3. Enter your [employer] Access Code exactly as it appears below. You will not need to use this code after youve completed the sign-up process. If you do not sign up before the expiration date, you must request a new code. · [employer] Access Code: [email or web address] Expires: 10/24/2017 12:17 PM 
 
4. Enter the last four digits of your Social Security Number (xxxx) and Date of Birth (mm/dd/yyyy) as indicated and click Submit. You will be taken to the next sign-up page. 5. Create a EVIIVO ID.  This will be your EV[employer] login ID and cannot be changed, so think of one that is secure and easy to remember. 6. Create a [employer] password. You can change your password at any time. 7. Enter your Password Reset Question and Answer. This can be used at a later time if you forget your password. 8. Enter your e-mail address. You will receive e-mail notification when new information is available in [address] 9. Click Sign Up. You can now view and download portions of your medical record. 10. Click the Download Summary menu link to download a portable copy of your medical information. If you have questions, please visit the Frequently Asked Questions section of the [employer] website. Remember, [employer] is NOT to be used for urgent needs. For medical emergencies, dial 911. Now available from your iPhone and Android! Please provide this summary of care documentation to your next provider. Your primary care clinician is listed as NONE. If you have any questions after today's visit, please call [phone].

## 2017-09-05 NOTE — PROGRESS NOTES
1. Have you been to the ER, urgent care clinic since your last visit? Hospitalized since your last visit? [facility]     2. Have you seen or consulted any other health care providers outside of the [address] [place] [address] since your last visit? Include any pap smears or colon screening.  No

## 2017-09-05 NOTE — PROGRESS NOTES
[facility] follow up. Patient on with Inpatient  Readmission stay for sepsis. Chart review: Follow up apt today (per patient's report)    Contacted patient for transitions of care services after discharge from readmission for sepsis. Mr. Zuhair Coreas answered his mobile phone, but was on his way for his follow up apt, and could not talk. He briefly informed me that, \"I am doing so much better! \"  He is open to another follow up call from me tomorrow. Will follow for [facility] services.   09/06

## 2017-09-06 ENCOUNTER — PATIENT OUTREACH (OUTPATIENT)
Dept: OTHER | Age: 62
End: 2017-09-06

## 2017-09-06 NOTE — PROGRESS NOTES
[facility] follow up. Patient on with  Readmission - Inpatient stay for sepsis. Chart review:  PCP - new pt apt 10/16 / Specialist appointment - surgical FU 9/5. Attempted to contact patient for transitions of care services. Left discreet message on voicemail with this CM contact information. Will follow for [facility] services.   Next attempt 2 days 9/8

## 2017-09-08 ENCOUNTER — PATIENT OUTREACH (OUTPATIENT)
Dept: OTHER | Age: 62
End: 2017-09-08

## 2017-09-08 NOTE — PROGRESS NOTES
[facility] follow up. Patient on with Inpatient stay for sepsis - DC . .    Chart review:  Planned whipple / darlene 10/06. Contacted Mr. Cristina for transitions of care services. Verified  and address for HIPAA security. * Mr. Irasema Fisher is home and feeling much better. He reports that he is about 90% now and working on increasing his strength and getting some weight back on. He is strong in his upper body, but notes weakness in his lower extremities. - Discussed muscle waisting with prolonged bed rest.  And Encouraged increased lean protein in his diet. His wife is a nurse and is assisting in healthy meals with poultry and fish. * He is worried about his upcoming whipple procedure and cancer diagnosis. 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      - All these attributes will make him stronger after surgery and speed his recovery. * I will check on him one more time before surgery / and he will talk to his wife to share that I will call and check on him during his recovery. Will follow for [facility] services.   10/02 Monday

## 2017-09-11 PROBLEM — C22.1 CHOLANGIOCARCINOMA OF BILIARY TRACT (HCC): Status: ACTIVE | Noted: 2017-09-11

## 2017-09-11 NOTE — PROGRESS NOTES
[address] [facility] Surgery      Clinic Note - Follow up    Subjective     Feliciano Salter returns for scheduled follow up today. He was seen in the hospital due to obstructive jaundice and he developed sepsis due to cholangitis after ERCP with biopsies/spyglass/placement of metal stent. He was treated with antibiotics and has recovered well. His biopsies showed evidence of adenocarcinoma in an area of distal ductal stricture consistent with an extrahepatic cholangiocarcinoma. No mass was seen on imaging or prior EUS. Biopsies at the bifurcation of the CBD were negative. The patient is now feeling well and interested in discussion about surgical resection. His staging workup was negative for additional disease. Objective     Visit Vitals    /80 (BP 1 Location: Left arm, BP Patient Position: Sitting)    Pulse 92    Temp 98.1 °F (36.7 °C) (Oral)    Resp 18    Ht 5' 8.5\" (1.74 m)    Wt 195 lb (88.5 kg)    SpO2 99%    BMI 29.22 kg/m2         PE  GEN - Awake, alert, communicating appropriately. NAD  Pulm - CTAB  CV - RRR  Abd - soft, NT, ND. No palpable masses or organomegaly. Ext - warm, well perfused. Labs  None    Assessment     Feliciano Salter is a 62 y. o.yr old male with obstructive jaundice now s/p ERCP and stenting that revealed a distal CBD cholangiocarcinoma. He appears to have localized disease without evidence of metastases. He developed cholangitis after metal stent placement but this has resolved. Plan     I would like to give the patient a few more weeks for inflammation to resolve after cholangitis and metal stent placement as this will make the operation much safer for dissection around the teri hepatis. I have discussed at length the operation that would be necessary for removal of his cancer which would be a whipple with extended bile duct resection.   I discussed the possibility of finding unresectable disease or having positive margins at the time of his surgery but that pre-operative imaging does not suggest this at this point. I will plan to set this up for him in the next few weeks. A complete discussion of the risks, benefits and alternatives to surgery were discussed with the patient who was keen to proceed. All questions were answered. 45 mins of time was spent with the patient of which > 50% of the time involved face-to-face counseling of the patient regarding the proposed treatment plan.         Mony Chow MD  9/5/2017

## 2017-09-21 ENCOUNTER — HOSPITAL ENCOUNTER (EMERGENCY)
Age: 62
Discharge: HOME | End: 2017-09-21
Attending: EMERGENCY MEDICINE
Payer: COMMERCIAL

## 2017-09-21 VITALS
HEIGHT: 68 IN | BODY MASS INDEX: 30.31 KG/M2 | HEART RATE: 90 BPM | TEMPERATURE: 97.9 F | DIASTOLIC BLOOD PRESSURE: 56 MMHG | WEIGHT: 200 LBS | OXYGEN SATURATION: 97 % | SYSTOLIC BLOOD PRESSURE: 93 MMHG | RESPIRATION RATE: 21 BRPM

## 2017-09-21 DIAGNOSIS — E86.9 VOLUME DEPLETION: ICD-10-CM

## 2017-09-21 DIAGNOSIS — R55 SYNCOPE AND COLLAPSE: Primary | ICD-10-CM

## 2017-09-21 LAB
ALBUMIN SERPL-MCNC: 3.5 G/DL (ref 3.5–5)
ALBUMIN/GLOB SERPL: 1.1 {RATIO} (ref 1.1–2.2)
ALP SERPL-CCNC: 89 U/L (ref 45–117)
ALT SERPL-CCNC: 37 U/L (ref 12–78)
ANION GAP SERPL CALC-SCNC: 10 MMOL/L (ref 5–15)
AST SERPL-CCNC: 27 U/L (ref 15–37)
BASOPHILS # BLD: 0 K/UL (ref 0–0.1)
BASOPHILS NFR BLD: 0 % (ref 0–1)
BILIRUB SERPL-MCNC: 1.2 MG/DL (ref 0.2–1)
BUN SERPL-MCNC: 26 MG/DL (ref 6–20)
BUN/CREAT SERPL: 19 (ref 12–20)
CALCIUM SERPL-MCNC: 8.4 MG/DL (ref 8.5–10.1)
CHLORIDE SERPL-SCNC: 107 MMOL/L (ref 97–108)
CO2 SERPL-SCNC: 21 MMOL/L (ref 21–32)
CREAT SERPL-MCNC: 1.34 MG/DL (ref 0.7–1.3)
EOSINOPHIL # BLD: 0.3 K/UL (ref 0–0.4)
EOSINOPHIL NFR BLD: 2 % (ref 0–7)
ERYTHROCYTE [DISTWIDTH] IN BLOOD BY AUTOMATED COUNT: 14.2 % (ref 11.5–14.5)
GLOBULIN SER CALC-MCNC: 3.2 G/DL (ref 2–4)
GLUCOSE SERPL-MCNC: 136 MG/DL (ref 65–100)
HCT VFR BLD AUTO: 38.9 % (ref 36.6–50.3)
HGB BLD-MCNC: 13.4 G/DL (ref 12.1–17)
LYMPHOCYTES # BLD: 0.8 K/UL (ref 0.8–3.5)
LYMPHOCYTES NFR BLD: 8 % (ref 12–49)
MCH RBC QN AUTO: 30.5 PG (ref 26–34)
MCHC RBC AUTO-ENTMCNC: 34.4 G/DL (ref 30–36.5)
MCV RBC AUTO: 88.6 FL (ref 80–99)
MONOCYTES # BLD: 0.7 K/UL (ref 0–1)
MONOCYTES NFR BLD: 7 % (ref 5–13)
NEUTS SEG # BLD: 8.7 K/UL (ref 1.8–8)
NEUTS SEG NFR BLD: 83 % (ref 32–75)
PLATELET # BLD AUTO: 151 K/UL (ref 150–400)
POTASSIUM SERPL-SCNC: 3.8 MMOL/L (ref 3.5–5.1)
PROT SERPL-MCNC: 6.7 G/DL (ref 6.4–8.2)
RBC # BLD AUTO: 4.39 M/UL (ref 4.1–5.7)
SODIUM SERPL-SCNC: 138 MMOL/L (ref 136–145)
TROPONIN I SERPL-MCNC: 0.04 NG/ML
WBC # BLD AUTO: 10.5 K/UL (ref 4.1–11.1)

## 2017-09-21 PROCEDURE — 99283 EMERGENCY DEPT VISIT LOW MDM: CPT

## 2017-09-21 PROCEDURE — 80053 COMPREHEN METABOLIC PANEL: CPT | Performed by: EMERGENCY MEDICINE

## 2017-09-21 PROCEDURE — 36415 COLL VENOUS BLD VENIPUNCTURE: CPT | Performed by: EMERGENCY MEDICINE

## 2017-09-21 PROCEDURE — 96360 HYDRATION IV INFUSION INIT: CPT

## 2017-09-21 PROCEDURE — 84484 ASSAY OF TROPONIN QUANT: CPT | Performed by: EMERGENCY MEDICINE

## 2017-09-21 PROCEDURE — 74011250636 HC RX REV CODE- 250/636: Performed by: EMERGENCY MEDICINE

## 2017-09-21 PROCEDURE — 85025 COMPLETE CBC W/AUTO DIFF WBC: CPT | Performed by: EMERGENCY MEDICINE

## 2017-09-21 PROCEDURE — 93005 ELECTROCARDIOGRAM TRACING: CPT

## 2017-09-21 RX ORDER — RAMIPRIL 10 MG/1
10 CAPSULE ORAL
COMMUNITY
End: 2017-10-26 | Stop reason: SDUPTHER

## 2017-09-21 RX ORDER — SODIUM CHLORIDE 9 MG/ML
1000 INJECTION, SOLUTION INTRAVENOUS CONTINUOUS
Status: DISCONTINUED | OUTPATIENT
Start: 2017-09-21 | End: 2017-09-22 | Stop reason: HOSPADM

## 2017-09-21 RX ADMIN — SODIUM CHLORIDE 1000 ML: 900 INJECTION, SOLUTION INTRAVENOUS at 20:33

## 2017-09-21 NOTE — ED NOTES
Pt presents today via ems for a syncopal episode. 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 Pt is unsure if he hit his head. EMS that were present at festival stated that the pt uma unresponsive, pulseless and apneic for approx 45 sec.  Pt is asymptomatic at this time and states \"I feel fine,\" Pt also states he has not urinated since 1500 today and

## 2017-09-22 ENCOUNTER — PATIENT OUTREACH (OUTPATIENT)
Dept: OTHER | Age: 62
End: 2017-09-22

## 2017-09-22 LAB
ATRIAL RATE: 91 BPM
CALCULATED P AXIS, ECG09: 88 DEGREES
CALCULATED R AXIS, ECG10: -12 DEGREES
CALCULATED T AXIS, ECG11: 121 DEGREES
DIAGNOSIS, 93000: NORMAL
P-R INTERVAL, ECG05: 154 MS
Q-T INTERVAL, ECG07: 372 MS
QRS DURATION, ECG06: 90 MS
QTC CALCULATION (BEZET), ECG08: 457 MS
VENTRICULAR RATE, ECG03: 91 BPM

## 2017-09-22 NOTE — ED NOTES
Discharge instructions reviewed with patient, copy given by Dr. Devorah Goodell. Pt is accomponied by family, denies use of wheelchair.

## 2017-09-22 NOTE — DISCHARGE INSTRUCTIONS
Fainting: Care Instructions  Your Care Instructions    When you faint, or pass out, you lose consciousness for a short time. A brief drop in blood flow to the brain often causes it. When you fall or lie down, more blood flows to your brain and you regain consciousness. Emotional stress, pain, or overheating--especially if you have been standing--can make you faint. In these cases, fainting is usually not serious. But fainting can be a sign of a more serious problem. Your doctor may want you to have more tests to rule out other causes. The treatment you need depends on the reason why you fainted. The doctor has checked you carefully, but problems can develop later. If you notice any problems or new symptoms, get medical treatment right away. Follow-up care is a key part of your treatment and safety. Be sure to make and go to all appointments, and call your doctor if you are having problems. It's also a good idea to know your test results and keep a list of the medicines you take. How can you care for yourself at home? · Drink plenty of fluids to prevent dehydration. If you have kidney, heart, or liver disease and have to limit fluids, talk with your doctor before you increase your fluid intake. When should you call for help? Call 911 anytime you think you may need emergency care. For example, call if:  · You have symptoms of a heart problem. These may include:  ¨ Chest pain or pressure. ¨ Severe trouble breathing. ¨ A fast or irregular heartbeat. ¨ Lightheadedness or sudden weakness. ¨ Coughing up pink, foamy mucus. ¨ Passing out. After you call 911, the  may tell you to chew 1 adult-strength or 2 to 4 low-dose aspirin. Wait for an ambulance. Do not try to drive yourself. · You have symptoms of a stroke. These may include:  ¨ Sudden numbness, tingling, weakness, or loss of movement in your face, arm, or leg, especially on only one side of your body. ¨ Sudden vision changes.   ¨ Sudden trouble speaking. ¨ Sudden confusion or trouble understanding simple statements. ¨ Sudden problems with walking or balance. ¨ A sudden, severe headache that is different from past headaches. · You passed out (lost consciousness) again. Watch closely for changes in your health, and be sure to contact your doctor if:  · You do not get better as expected. Where can you learn more? Go to http://sandra-jenelle.info/. Enter O287 in the search box to learn more about \"Fainting: Care Instructions. \"  Current as of: March 20, 2017  Content Version: 11.3  © 9303-3630 [employer]. Care instructions adapted under license by [employer] (which disclaims liability or warranty for this information). If you have questions about a medical condition or this instruction, always ask your healthcare professional. [address] any warranty or liability for your use of this information.

## 2017-09-22 NOTE — PROGRESS NOTES
Transferring programs from [facility] to [facility]    Patient on report as with discharge from ED visit following a syncopal episode. Mr. Samy Moralez contacted for follow up. Verified  and address for HIPAA security. 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 *His wife is a nurse and warned him before attending this function, but \"it got away from me\"    \"I'll have a hard time living this down. \"      Discussed the difficulty in understanding that he is still in recovery from septic event with prolonged hospital stay. \"I thought I was over it! \"      Education provided about dehydration/ drop in BP    - Used screen door imagery - See AVS  * Discussed his body needed to heal and he needs to focus on hydration and rest.   - Asked him to rest seated for 10 minutes out of every hour.   - Focus on color of urine and ensure he is voiding at least every 2 hours. - Document hydration level - double the intake for heat exposure. - Standing precautions for syncope - switch legs, avoid fast posture changes/ bending and changing too much. - if dizziness or vision changes, sit IMMEDIATELY. Cautioned of trauma with falling - head trauma/ hip fracture/ spine damage. Better safe than sorry. Discussed CM program options -  Mr. Samy Moralez is open to prolonged CM due to his need to allign with new PCP; upcoming surgery. Closing [facility] program/ opening [facility]. Patient eligible for [employer] care management    PMH:   Past Medical History:   Diagnosis Date    Autoimmune disease ([facility] [address]     Hypertension     Ill-defined condition     Previous a.fib, ablation, NSR now       Social History:   Social History     Social History    Marital status:      Spouse name: N/A    Number of children: N/A    Years of education: N/A     Occupational History    Not on file.      Social History Main Topics    Smoking status: Never Smoker    Smokeless tobacco: Never Used   Lynda Guevara Alcohol use No    Drug use: Yes     Special: Prescription, OTC    Sexual activity: Not on file     Other Topics Concern    Not on file     Social History Narrative       Care management assessment completed and Welcome letter sent. Patient identified Short Term Goal  -  Successful recovery from upcoming whipple surgery procedure. Patient identified Long Term Goal - establish PCP in the [place] area and regain clean bill of health. Patient verbalized understanding of all information discussed. Pt has my contact information for any further questions, concerns, or needs. Plan for next call:  Monday 9/25        MEDICATIONS GIVEN:  Medications   0.9% sodium chloride infusion 1,000 mL (1,000 mL IntraVENous New Bag 9/21/17 2033)         IMPRESSION:  1. Syncope and collapse    2. Volume depletion          PLAN:  1. Current Discharge Medication List          2.    Follow-up Information     Follow up With Details Comments Leobardo Coburn III, DO In 3 days   [id number] [facility]  [phone]     [facility] EMERGENCY DEPT   If symptoms worsen [address]  [facility] [address]   [address] [phone]

## 2017-09-22 NOTE — ED PROVIDER NOTES
HPI Comments: Feliciano Salter is a 62 y.o. male with PMhx significant for HTN and Afib who presents via EMS to [facility] ED for further evaluation after a syncopal episode earlier today. 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 He states that when he began feeling fatigued and saw \"black specs\" so he sat on the back of a tailgate. He states the next thing he remembers is that EMS told him that he passed out and fell off of the tailgate. He states that he likely passed out from dehydration, noting he consume an alcoholic drink without any other fluids. 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 He denies being on a blood thinner. He denies any pain with inspiration. Pt specifically denies any sx of generalized pain, SOB, and leg swelling. SHx: (-) tobacco use; (-) EtOH use; (-) illicit drug use    PCP: Rick Gerber III, DO    There are no other complaints, changes or physical findings at this time. The history is provided by the patient. No  was used. Past Medical History:   Diagnosis Date    Autoimmune disease ([address]     Hypertension     Ill-defined condition     Previous a.fib, ablation, NSR now       Past Surgical History:   Procedure Laterality Date    CARDIAC SURG PROCEDURE UNLIST      Ablation 2005    HX ORTHOPAEDIC      Spinal fusion     NEUROLOGICAL PROCEDURE UNLISTED  2005    Ting hole washout from cerebral hemorrhage         Family History:   Problem Relation Age of Onset    Cancer Father      Breast and Colon       Social History     Social History    Marital status:      Spouse name: N/A    Number of children: N/A    Years of education: N/A     Occupational History    Not on file.      Social History Main Topics    Smoking status: Never Smoker    Smokeless tobacco: Never Used    Alcohol use No    Drug use: Yes     Special: Prescription, OTC  Sexual activity: Not on file     Other Topics Concern    Not on file     Social History Narrative         ALLERGIES: Pcn [penicillins]    Review of Systems   Constitutional: Negative for chills and fever. HENT: Negative for congestion and sore throat. Eyes: Negative for visual disturbance. Respiratory: Negative for cough and shortness of breath. Cardiovascular: Negative for chest pain and leg swelling. Gastrointestinal: Negative for abdominal pain, blood in stool and nausea. Endocrine: Negative for polyuria. Genitourinary: Negative for dysuria and testicular pain. Musculoskeletal: Negative for arthralgias, joint swelling and myalgias. Denies generalized pain   Skin: Negative for rash. Allergic/Immunologic: Negative for immunocompromised state. Neurological: Positive for syncope. Negative for weakness and headaches. Hematological: Does not bruise/bleed easily. Psychiatric/Behavioral: Negative for confusion. All other systems reviewed and are negative. Vitals:    09/21/17 1943   BP: 93/56   Pulse: 90   Resp: 21   Temp: 97.9 °F (36.6 °C)   SpO2: 97%   Weight: 90.7 kg (200 lb)   Height: 5' 8\" (1.727 m)            Physical Exam   Constitutional: He is oriented to person, place, and time. He appears well-developed and well-nourished. HENT:   Head: Normocephalic and atraumatic. Moist mucous membranes   Eyes: Conjunctivae are normal. Pupils are equal, round, and reactive to light. Right eye exhibits no discharge. Left eye exhibits no discharge. Neck: Normal range of motion. Neck supple. No tracheal deviation present. Cardiovascular: Normal rate, regular rhythm and normal heart sounds. No murmur heard. Pulmonary/Chest: Effort normal and breath sounds normal. No respiratory distress. He has no wheezes. He has no rales. Abdominal: Soft. Bowel sounds are normal. There is no tenderness. There is no rebound and no guarding. Musculoskeletal: Normal range of motion.  He exhibits no edema, tenderness or deformity. Neurological: He is alert and oriented to person, place, and time. Skin: Skin is warm and dry. No rash noted. No erythema. Psychiatric: His behavior is normal.   Nursing note and vitals reviewed. MDM  Number of Diagnoses or Management Options  Syncope and collapse:   Volume depletion:   Diagnosis management comments: Pt with syncopal episode in presence of being outside all day and working on a grill and not taking any fluids other than alcohol. Story consistent with volume depletion and syncope secondary to that. Pt has no signs or sx's of PE. Family alerted to possibility of PE and they agree that his story is most consistent with volume depletion and have decided to not pursue further work- up. Disposition is likey discharge after hydration with IV fluids. Amount and/or Complexity of Data Reviewed  Clinical lab tests: ordered and reviewed  Tests in the medicine section of CPT®: ordered and reviewed  Review and summarize past medical records: yes  Independent visualization of images, tracings, or specimens: yes    Patient Progress  Patient progress: stable    ED Course       Procedures  EKG interpretation: (Preliminary) 1938  Rhythm: normal sinus rhythm; and regular . Rate (approx.): 91; Axis: normal; P wave: normal; QRS interval: 90 ms; QT/QTc: 372/ 457 ms; no ischemic changes.   Written by Shmuel Rain ED Scribalyssa, as dictated by Comfort Maciel DO.    LABORATORY TESTS:  Recent Results (from the past 12 hour(s))   EKG, 12 LEAD, INITIAL    Collection Time: 09/21/17  7:38 PM   Result Value Ref Range    Ventricular Rate 91 BPM    Atrial Rate 91 BPM    P-R Interval 154 ms    QRS Duration 90 ms    Q-T Interval 372 ms    QTC Calculation (Bezet) 457 ms    Calculated P Axis 88 degrees    Calculated R Axis -12 degrees    Calculated T Axis 121 degrees    Diagnosis       Normal sinus rhythm  Nonspecific T wave abnormality  Abnormal ECG  When compared with ECG of 25-AUG-2017 00:51,  premature ventricular complexes are no longer present  T wave inversion no longer evident in Inferior leads  Nonspecific T wave abnormality now evident in Anterolateral leads     CBC WITH AUTOMATED DIFF    Collection Time: 09/21/17  8:11 PM   Result Value Ref Range    WBC 10.5 4.1 - 11.1 K/uL    RBC 4.39 4. 10 - 5.70 M/uL    HGB 13.4 12.1 - 17.0 g/dL    HCT 38.9 36.6 - 50.3 %    MCV 88.6 80.0 - 99.0 FL    MCH 30.5 26.0 - 34.0 PG    MCHC 34.4 30.0 - 36.5 g/dL    RDW 14.2 11.5 - 14.5 %    PLATELET 507 151 - 408 K/uL    NEUTROPHILS 83 (H) 32 - 75 %    LYMPHOCYTES 8 (L) 12 - 49 %    MONOCYTES 7 5 - 13 %    EOSINOPHILS 2 0 - 7 %    BASOPHILS 0 0 - 1 %    ABS. NEUTROPHILS 8.7 (H) 1.8 - 8.0 K/UL    ABS. LYMPHOCYTES 0.8 0.8 - 3.5 K/UL    ABS. MONOCYTES 0.7 0.0 - 1.0 K/UL    ABS. EOSINOPHILS 0.3 0.0 - 0.4 K/UL    ABS. BASOPHILS 0.0 0.0 - 0.1 K/UL   METABOLIC PANEL, COMPREHENSIVE    Collection Time: 09/21/17  8:11 PM   Result Value Ref Range    Sodium 138 136 - 145 mmol/L    Potassium 3.8 3.5 - 5.1 mmol/L    Chloride 107 97 - 108 mmol/L    CO2 21 21 - 32 mmol/L    Anion gap 10 5 - 15 mmol/L    Glucose 136 (H) 65 - 100 mg/dL    BUN 26 (H) 6 - 20 MG/DL    Creatinine 1.34 (H) 0.70 - 1.30 MG/DL    BUN/Creatinine ratio 19 12 - 20      GFR est AA >60 >60 ml/min/1.73m2    GFR est non-AA 54 (L) >60 ml/min/1.73m2    Calcium 8.4 (L) 8.5 - 10.1 MG/DL    Bilirubin, total 1.2 (H) 0.2 - 1.0 MG/DL    ALT (SGPT) 37 12 - 78 U/L    AST (SGOT) 27 15 - 37 U/L    Alk. phosphatase 89 45 - 117 U/L    Protein, total 6.7 6.4 - 8.2 g/dL    Albumin 3.5 3.5 - 5.0 g/dL    Globulin 3.2 2.0 - 4.0 g/dL    A-G Ratio 1.1 1.1 - 2.2     TROPONIN I    Collection Time: 09/21/17  8:11 PM   Result Value Ref Range    Troponin-I, Qt. 0.04 <0.05 ng/mL       MEDICATIONS GIVEN:  Medications   0.9% sodium chloride infusion 1,000 mL (1,000 mL IntraVENous New Bag 9/21/17 2033)       IMPRESSION:  1. Syncope and collapse    2.  Volume depletion PLAN:  1. Current Discharge Medication List        2. Follow-up Information     Follow up With Details Comments [address] III, DO In 3 days  [address] [facility]  [phone]      [facility] EMERGENCY DEPT  If symptoms worsen [address] [address]  [phone]        Return to ED if worse   Discharge Note:  9:51 PM  The patient is ready for discharge. The patient's signs, symptoms, diagnosis, and discharge instruction have been discussed and the patient has conveyed their understanding. The patient is to follow up as recommended or return to the ER should their symptoms worsen. Plan has been discussed and the patient is in agreement. Written by Naa Cerda ED Scribe, as dictated by Elizabeth Baumgarten, DO      Attestation: This is note is prepared by Naa Cerda, acting as Scribe for Elizabeth Baumgarten, DO Elizabeth Baumgarten, DO The scribe's documentation has been prepared under my direction and personally reviewed by me in its entirety. I confirm that the note above accurately reflects all work, treatment, procedures, and medical decision making performed by me.

## 2017-09-22 NOTE — LETTER
9/22/2017 12:21 PM 
 
Mr. Wong Riding [address] [place] [address] Welcome Letter and Visit Checklist 
 
Congratulations for taking a step towards managing your health by participating in the Employee Care Management (ECM) program. ECM is a new model of care designed to improve the coordination of your health care with an emphasis on your overall well-being. This confidential program is offered free of charge to [employer] members and their covered family members. BEFORE YOUR NEXT ECM NURSE ASSESSMENT CALL PLEASE USE THIS HANDY CHECKLIST: 
 
o Make a list of any questions you have about your health including questions about dietary recommendations, lifestyle, and disease management. o Inform the [facility] nurse of any other health care providers that you have visited in the last month and the reason why you visited them. This includes urgent care or the ED. 
o Have a list of all of your prescribed medications ready, over-the counter, herbal and dietary supplements. Inform the [facility] nurse of any refills that you require. o Inform the ECM nurse of any new problems that may have developed in the last month. 
o Confirm the date of your next [facility] nurse assessment call. 
o [place] for our patient portal [employer] if you have not already. This is a good way to communicate with your Care Team, including your doctor and [facility] nurse, as well as to view your care plan. 
o If you want to designate a caregiver to have access to your record, please ask your doctor's office for the forms to sign. o Think about any goals you want to begin working on towards a goal of better health. With continued partnership in the [facility] program, we hope to optimize your health, increase your quality of life, and prevent hospitalization. We look forward to continuing to serve you. If you have any health concerns prior to you next [facility] outreach, please contact your primary care doctor. Your ECM nurse contact information is listed below for non-urgent questions: 
[place], [address] [address]  
Phone:  [phone]     Fax:  [phone]    [email or web address] 
 
 
 
 
Sincerely, 
 
 
Campbell Henley RN

## 2017-09-22 NOTE — PATIENT INSTRUCTIONS
Kidneys are our bodys filtration system,  like a screen door filters air coming into your house:     Blood pressure is like the wind coming through the screen door  o If the blood pressure is normal, the screen functions to filter out dust, pollen, insects. In your body, the trash is filtered into urine so your body can eliminate it, and not return to your blood stream.    o If your blood pressure is high, the wind is strong and can send things flying into the screen and damage the screen. Too many wholes will damage the kidneys ability to filter trash from your blood. o If your blood pressure is low, the wind isnt blowing enough, so urine production drops and may indicate dehydration or other issues that are medically dangerous.

## 2017-09-25 ENCOUNTER — PATIENT OUTREACH (OUTPATIENT)
Dept: OTHER | Age: 62
End: 2017-09-25

## 2017-09-25 NOTE — PROGRESS NOTES
[facility] progress note    Called Mr. Cristina  at agreed time. Verified  and address for HIPAA security. 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 Hydrating and drinks one electrolyte infused drink per day. * Discussed that his PCP visit is 10/16 and not before surgery. - He reports that this was per PCP office. They prefer to begin coverage after surgery. * He is very comfortable with his health now and getting the farm set for fall as much as possible prior to surgery. * Reviewed welcome material with Mr. King Days he has not received it in the mail yet. Informed him that I did send business card for his wife. - Confirmed his goals :  Prevent complications; medical plan; and getting him into a PCP for FU. After surgery, he will see what physician's plans are, and goals may be altered pending what treatment plan is needed. Congratulated him on moving forward. Patient verbalized understanding of all information discussed. Pt has my contact information for any further questions, concerns, or needs. Plan for next call:  10/04                      FINAL PATHOLOGIC DIAGNOSIS   1. Common bile duct, bifurcation, biopsy:   Scant, superficial fragment of crushed ductal epithelium with chronic inflammation   No malignancy is identified   2.  Common bile duct, stricture, biopsy:   Adenocarcinoma, scant

## 2017-09-28 ENCOUNTER — TELEPHONE (OUTPATIENT)
Dept: SURGERY | Age: 62
End: 2017-09-28

## 2017-09-28 NOTE — TELEPHONE ENCOUNTER
Returned patients call and advised him per Dr. Johnson Massed its ok for him to get a flu shot. Patient stated he will get one tomorrow.

## 2017-09-29 ENCOUNTER — HOSPITAL ENCOUNTER (OUTPATIENT)
Dept: PREADMISSION TESTING | Age: 62
Discharge: HOME | End: 2017-09-29
Payer: COMMERCIAL

## 2017-09-29 VITALS
WEIGHT: 194 LBS | BODY MASS INDEX: 29.4 KG/M2 | DIASTOLIC BLOOD PRESSURE: 87 MMHG | HEART RATE: 78 BPM | SYSTOLIC BLOOD PRESSURE: 128 MMHG | TEMPERATURE: 98.3 F | HEIGHT: 68 IN

## 2017-09-29 LAB
APTT PPP: 27.4 SEC (ref 22.1–32.5)
INR PPP: 1 (ref 0.9–1.1)
PROTHROMBIN TIME: 10.3 SEC (ref 9–11.1)
THERAPEUTIC RANGE,PTTT: NORMAL SECS (ref 58–77)

## 2017-09-29 PROCEDURE — 85730 THROMBOPLASTIN TIME PARTIAL: CPT | Performed by: SURGERY

## 2017-09-29 PROCEDURE — 36415 COLL VENOUS BLD VENIPUNCTURE: CPT | Performed by: SURGERY

## 2017-09-29 PROCEDURE — 85610 PROTHROMBIN TIME: CPT | Performed by: SURGERY

## 2017-09-29 PROCEDURE — 86900 BLOOD TYPING SEROLOGIC ABO: CPT | Performed by: SURGERY

## 2017-09-29 PROCEDURE — 86923 COMPATIBILITY TEST ELECTRIC: CPT | Performed by: SURGERY

## 2017-09-29 RX ORDER — MAGNESIUM 200 MG
TABLET ORAL DAILY
Status: ON HOLD | COMMUNITY
End: 2018-02-15

## 2017-09-29 RX ORDER — GUAIFENESIN 100 MG/5ML
81 LIQUID (ML) ORAL
COMMUNITY
End: 2018-06-12

## 2017-09-29 RX ORDER — CETIRIZINE HCL 10 MG
10 TABLET ORAL
COMMUNITY

## 2017-10-02 ENCOUNTER — DOCUMENTATION ONLY (OUTPATIENT)
Dept: SURGERY | Age: 62
End: 2017-10-02

## 2017-10-02 NOTE — PERIOP NOTES
Message sent to Renae YARBROUGH--Dr Eduardo Wan re: recent CT Scan of Chest done 8/31/17;  CXR not done during preop testing. Does Dr. Eduardo Wan want a CXR done preop?

## 2017-10-02 NOTE — PROGRESS NOTES
CXR?  Received: Today       ESVIN Edwards LPN                      Followup to prior message:  CT Scan of Chest done on 8/31/17. Is this sufficient preop or does Dr. José Wellington want a CXR preop?  This was not done during PAT since CT of Chest had recently been done. Please respond. Thank you! ESVIN Garcia MD Cordell Mages, LPN                     I am ok without a CXR.  CT chest was negative.       Thank you,     Jean Claude Vale MD            Previous Messages          Message was forwarded to Swati Young in pre admission testing and Dr. BARRERA [facility] nurse, Mekhi Patel.

## 2017-10-04 ENCOUNTER — PATIENT OUTREACH (OUTPATIENT)
Dept: OTHER | Age: 62
End: 2017-10-04

## 2017-10-04 NOTE — PROGRESS NOTES
[facility] progress note    Called Mr. Cristina  at agreed time. Verified  and address for HIPAA security. Changes since last call include:     Med changes :  None -  Pre op screen completed. Doctors appointments :  Surgery Friday 10/06 - Whipple procedure. * Surgery is planned for Friday - he is \"as ready as I'm going to be. \"     * They have a bedroom on the first floor of the house with full bathroom;  DME of a walker is available for his use if needed. * Expecting to be inpatient for 7 days. He knows to expect 3 days in ICU setting post op. * Confirmed that his wife has my contact and he expects me to communicate with her. She is a nurse, instructor in nursing school. * No questions or concerns at this time. Congratulated him on moving forward.  /  Discussed post op plans including discharge needs; follow up treatment - his doctor thinks the cancer is in situ and will not require chemotherapy. Patient verbalized understanding of all information discussed. Pt has my contact information for any further questions, concerns, or needs.     Plan for next call:  Monitor for discharge from Whipple procedure - Fri 10/13

## 2017-10-05 ENCOUNTER — ANESTHESIA EVENT (OUTPATIENT)
Dept: SURGERY | Age: 62
DRG: 406 | End: 2017-10-05
Payer: COMMERCIAL

## 2017-10-06 ENCOUNTER — ANESTHESIA (OUTPATIENT)
Dept: SURGERY | Age: 62
DRG: 406 | End: 2017-10-06
Payer: COMMERCIAL

## 2017-10-06 ENCOUNTER — HOSPITAL ENCOUNTER (INPATIENT)
Age: 62
LOS: 10 days | Discharge: HOME | DRG: 406 | End: 2017-10-16
Attending: SURGERY | Admitting: SURGERY
Payer: COMMERCIAL

## 2017-10-06 PROBLEM — C24.9: Status: ACTIVE | Noted: 2017-10-06

## 2017-10-06 LAB
ALBUMIN SERPL-MCNC: 3.4 G/DL (ref 3.5–5)
ALBUMIN/GLOB SERPL: 2.1 {RATIO} (ref 1.1–2.2)
ALP SERPL-CCNC: 56 U/L (ref 45–117)
ALT SERPL-CCNC: 175 U/L (ref 12–78)
ANION GAP SERPL CALC-SCNC: 9 MMOL/L (ref 5–15)
AST SERPL-CCNC: 208 U/L (ref 15–37)
BILIRUB SERPL-MCNC: 2.1 MG/DL (ref 0.2–1)
BUN SERPL-MCNC: 17 MG/DL (ref 6–20)
BUN/CREAT SERPL: 16 (ref 12–20)
CALCIUM SERPL-MCNC: 8 MG/DL (ref 8.5–10.1)
CHLORIDE SERPL-SCNC: 107 MMOL/L (ref 97–108)
CO2 SERPL-SCNC: 23 MMOL/L (ref 21–32)
CREAT SERPL-MCNC: 1.09 MG/DL (ref 0.7–1.3)
ERYTHROCYTE [DISTWIDTH] IN BLOOD BY AUTOMATED COUNT: 14.5 % (ref 11.5–14.5)
GLOBULIN SER CALC-MCNC: 1.6 G/DL (ref 2–4)
GLUCOSE BLD STRIP.AUTO-MCNC: 215 MG/DL (ref 65–100)
GLUCOSE SERPL-MCNC: 218 MG/DL (ref 65–100)
HCT VFR BLD AUTO: 32.9 % (ref 36.6–50.3)
HGB BLD-MCNC: 11 G/DL (ref 12.1–17)
INR PPP: 1.1 (ref 0.9–1.1)
MCH RBC QN AUTO: 30.3 PG (ref 26–34)
MCHC RBC AUTO-ENTMCNC: 33.4 G/DL (ref 30–36.5)
MCV RBC AUTO: 90.6 FL (ref 80–99)
PLATELET # BLD AUTO: 96 K/UL (ref 150–400)
POTASSIUM SERPL-SCNC: 4.3 MMOL/L (ref 3.5–5.1)
PROT SERPL-MCNC: 5 G/DL (ref 6.4–8.2)
PROTHROMBIN TIME: 11.5 SEC (ref 9–11.1)
RBC # BLD AUTO: 3.63 M/UL (ref 4.1–5.7)
SERVICE CMNT-IMP: ABNORMAL
SODIUM SERPL-SCNC: 139 MMOL/L (ref 136–145)
WBC # BLD AUTO: 9.6 K/UL (ref 4.1–11.1)

## 2017-10-06 PROCEDURE — 74011250636 HC RX REV CODE- 250/636

## 2017-10-06 PROCEDURE — 77030002916 HC SUT ETHLN J&J -A: Performed by: SURGERY

## 2017-10-06 PROCEDURE — 07BB0ZZ EXCISION OF MESENTERIC LYMPHATIC, OPEN APPROACH: ICD-10-PCS | Performed by: SURGERY

## 2017-10-06 PROCEDURE — 77030008771 HC TU NG SALEM SUMP -A: Performed by: NURSE ANESTHETIST, CERTIFIED REGISTERED

## 2017-10-06 PROCEDURE — 88305 TISSUE EXAM BY PATHOLOGIST: CPT | Performed by: SURGERY

## 2017-10-06 PROCEDURE — 0DBU0ZZ EXCISION OF OMENTUM, OPEN APPROACH: ICD-10-PCS | Performed by: SURGERY

## 2017-10-06 PROCEDURE — 77030020417 HC STPLR ENDO GIA COVD -C: Performed by: SURGERY

## 2017-10-06 PROCEDURE — 85610 PROTHROMBIN TIME: CPT | Performed by: SURGERY

## 2017-10-06 PROCEDURE — 74011000258 HC RX REV CODE- 258: Performed by: SURGERY

## 2017-10-06 PROCEDURE — 77030013079 HC BLNKT BAIR HGGR 3M -A: Performed by: NURSE ANESTHETIST, CERTIFIED REGISTERED

## 2017-10-06 PROCEDURE — 77030019702 HC WRP THER MENM -C: Performed by: SURGERY

## 2017-10-06 PROCEDURE — 85018 HEMOGLOBIN: CPT

## 2017-10-06 PROCEDURE — 77030032490 HC SLV COMPR SCD KNE COVD -B: Performed by: SURGERY

## 2017-10-06 PROCEDURE — 77030010938 HC CLP LIG TELE -A: Performed by: SURGERY

## 2017-10-06 PROCEDURE — 74011000250 HC RX REV CODE- 250: Performed by: SURGERY

## 2017-10-06 PROCEDURE — 87205 SMEAR GRAM STAIN: CPT | Performed by: SURGERY

## 2017-10-06 PROCEDURE — 77030002933 HC SUT MCRYL J&J -A: Performed by: SURGERY

## 2017-10-06 PROCEDURE — 74011636637 HC RX REV CODE- 636/637: Performed by: SURGERY

## 2017-10-06 PROCEDURE — 77030026438 HC STYL ET INTUB CARD -A: Performed by: NURSE ANESTHETIST, CERTIFIED REGISTERED

## 2017-10-06 PROCEDURE — 74011000250 HC RX REV CODE- 250

## 2017-10-06 PROCEDURE — 88309 TISSUE EXAM BY PATHOLOGIST: CPT | Performed by: SURGERY

## 2017-10-06 PROCEDURE — 85027 COMPLETE CBC AUTOMATED: CPT | Performed by: SURGERY

## 2017-10-06 PROCEDURE — P9045 ALBUMIN (HUMAN), 5%, 250 ML: HCPCS

## 2017-10-06 PROCEDURE — 80053 COMPREHEN METABOLIC PANEL: CPT | Performed by: SURGERY

## 2017-10-06 PROCEDURE — 87075 CULTR BACTERIA EXCEPT BLOOD: CPT | Performed by: SURGERY

## 2017-10-06 PROCEDURE — 77030010507 HC ADH SKN DERMBND J&J -B: Performed by: SURGERY

## 2017-10-06 PROCEDURE — 77030007880 HC KT SPN EPDRL BBMI -B

## 2017-10-06 PROCEDURE — 0FT40ZZ RESECTION OF GALLBLADDER, OPEN APPROACH: ICD-10-PCS | Performed by: SURGERY

## 2017-10-06 PROCEDURE — 77030014007 HC SPNG HEMSTAT J&J -B: Performed by: SURGERY

## 2017-10-06 PROCEDURE — 77030009965 HC RELD STPLR ENDOS COVD -D: Performed by: SURGERY

## 2017-10-06 PROCEDURE — 74011250636 HC RX REV CODE- 250/636: Performed by: ANESTHESIOLOGY

## 2017-10-06 PROCEDURE — 77030020782 HC GWN BAIR PAWS FLX 3M -B

## 2017-10-06 PROCEDURE — 77030002986 HC SUT PROL J&J -A: Performed by: SURGERY

## 2017-10-06 PROCEDURE — 87186 SC STD MICRODIL/AGAR DIL: CPT | Performed by: SURGERY

## 2017-10-06 PROCEDURE — 87077 CULTURE AEROBIC IDENTIFY: CPT | Performed by: SURGERY

## 2017-10-06 PROCEDURE — 0FB90ZZ EXCISION OF COMMON BILE DUCT, OPEN APPROACH: ICD-10-PCS | Performed by: SURGERY

## 2017-10-06 PROCEDURE — 74011000250 HC RX REV CODE- 250: Performed by: ANESTHESIOLOGY

## 2017-10-06 PROCEDURE — 77030018846 HC SOL IRR STRL H20 ICUM -A: Performed by: SURGERY

## 2017-10-06 PROCEDURE — 0FBG0ZZ EXCISION OF PANCREAS, OPEN APPROACH: ICD-10-PCS | Performed by: SURGERY

## 2017-10-06 PROCEDURE — 36415 COLL VENOUS BLD VENIPUNCTURE: CPT | Performed by: SURGERY

## 2017-10-06 PROCEDURE — 77030010939 HC CLP LIG TELE -B: Performed by: SURGERY

## 2017-10-06 PROCEDURE — 77030011264 HC ELECTRD BLD EXT COVD -A: Performed by: SURGERY

## 2017-10-06 PROCEDURE — 77030031139 HC SUT VCRL2 J&J -A: Performed by: SURGERY

## 2017-10-06 PROCEDURE — 74011250636 HC RX REV CODE- 250/636: Performed by: SURGERY

## 2017-10-06 PROCEDURE — 65610000006 HC RM INTENSIVE CARE

## 2017-10-06 PROCEDURE — 77030034850: Performed by: SURGERY

## 2017-10-06 PROCEDURE — 77030008684 HC TU ET CUF COVD -B: Performed by: NURSE ANESTHETIST, CERTIFIED REGISTERED

## 2017-10-06 PROCEDURE — 77030011640 HC PAD GRND REM COVD -A: Performed by: SURGERY

## 2017-10-06 PROCEDURE — 77030012893

## 2017-10-06 PROCEDURE — 77030019908 HC STETH ESOPH SIMS -A: Performed by: NURSE ANESTHETIST, CERTIFIED REGISTERED

## 2017-10-06 PROCEDURE — 77030002966 HC SUT PDS J&J -A: Performed by: SURGERY

## 2017-10-06 PROCEDURE — 77030013567 HC DRN WND RESERV BARD -A: Performed by: SURGERY

## 2017-10-06 PROCEDURE — 88331 PATH CONSLTJ SURG 1 BLK 1SPC: CPT | Performed by: SURGERY

## 2017-10-06 PROCEDURE — 77030012407 HC DRN WND BARD -B: Performed by: SURGERY

## 2017-10-06 PROCEDURE — 82962 GLUCOSE BLOOD TEST: CPT

## 2017-10-06 PROCEDURE — 0DB90ZZ EXCISION OF DUODENUM, OPEN APPROACH: ICD-10-PCS | Performed by: SURGERY

## 2017-10-06 PROCEDURE — 76010000181 HC OR TIME 7 TO 7.5 HR INTENSV-TIER 1: Performed by: SURGERY

## 2017-10-06 PROCEDURE — 88304 TISSUE EXAM BY PATHOLOGIST: CPT | Performed by: SURGERY

## 2017-10-06 PROCEDURE — 77030034698 HC LIGASURE MRYLND OPN SEAL DIV COVD -F: Performed by: SURGERY

## 2017-10-06 PROCEDURE — 76060000045 HC ANESTHESIA 7 TO 7.5 HR: Performed by: SURGERY

## 2017-10-06 PROCEDURE — 77030018836 HC SOL IRR NACL ICUM -A: Performed by: SURGERY

## 2017-10-06 PROCEDURE — 77030010516 HC APPL HEMA CLP TELE -B: Performed by: SURGERY

## 2017-10-06 PROCEDURE — 77030008467 HC STPLR SKN COVD -B: Performed by: SURGERY

## 2017-10-06 PROCEDURE — 76210000017 HC OR PH I REC 1.5 TO 2 HR: Performed by: SURGERY

## 2017-10-06 PROCEDURE — 77030018723 HC ELCTRD BLD COVD -A: Performed by: SURGERY

## 2017-10-06 PROCEDURE — 77030002996 HC SUT SLK J&J -A: Performed by: SURGERY

## 2017-10-06 RX ORDER — SODIUM CHLORIDE 9 MG/ML
INJECTION, SOLUTION INTRAVENOUS
Status: DISCONTINUED | OUTPATIENT
Start: 2017-10-06 | End: 2017-10-06 | Stop reason: HOSPADM

## 2017-10-06 RX ORDER — DIPHENHYDRAMINE HYDROCHLORIDE 50 MG/ML
12.5 INJECTION, SOLUTION INTRAMUSCULAR; INTRAVENOUS AS NEEDED
Status: DISCONTINUED | OUTPATIENT
Start: 2017-10-06 | End: 2017-10-06 | Stop reason: HOSPADM

## 2017-10-06 RX ORDER — SODIUM CHLORIDE 0.9 % (FLUSH) 0.9 %
5-10 SYRINGE (ML) INJECTION AS NEEDED
Status: DISCONTINUED | OUTPATIENT
Start: 2017-10-06 | End: 2017-10-16 | Stop reason: HOSPADM

## 2017-10-06 RX ORDER — KETAMINE HYDROCHLORIDE 10 MG/ML
INJECTION, SOLUTION INTRAMUSCULAR; INTRAVENOUS AS NEEDED
Status: DISCONTINUED | OUTPATIENT
Start: 2017-10-06 | End: 2017-10-06 | Stop reason: HOSPADM

## 2017-10-06 RX ORDER — ONDANSETRON 2 MG/ML
4 INJECTION INTRAMUSCULAR; INTRAVENOUS
Status: DISCONTINUED | OUTPATIENT
Start: 2017-10-06 | End: 2017-10-16 | Stop reason: HOSPADM

## 2017-10-06 RX ORDER — MORPHINE SULFATE 0.5 MG/ML
INJECTION, SOLUTION EPIDURAL; INTRATHECAL; INTRAVENOUS AS NEEDED
Status: DISCONTINUED | OUTPATIENT
Start: 2017-10-06 | End: 2017-10-06 | Stop reason: HOSPADM

## 2017-10-06 RX ORDER — SODIUM CHLORIDE 0.9 % (FLUSH) 0.9 %
5-10 SYRINGE (ML) INJECTION EVERY 8 HOURS
Status: DISCONTINUED | OUTPATIENT
Start: 2017-10-06 | End: 2017-10-06 | Stop reason: HOSPADM

## 2017-10-06 RX ORDER — MIDAZOLAM HYDROCHLORIDE 1 MG/ML
INJECTION, SOLUTION INTRAMUSCULAR; INTRAVENOUS AS NEEDED
Status: DISCONTINUED | OUTPATIENT
Start: 2017-10-06 | End: 2017-10-06 | Stop reason: HOSPADM

## 2017-10-06 RX ORDER — SODIUM CHLORIDE 9 MG/ML
250 INJECTION, SOLUTION INTRAVENOUS AS NEEDED
Status: DISCONTINUED | OUTPATIENT
Start: 2017-10-06 | End: 2017-10-06 | Stop reason: HOSPADM

## 2017-10-06 RX ORDER — SODIUM CHLORIDE 0.9 % (FLUSH) 0.9 %
5-10 SYRINGE (ML) INJECTION AS NEEDED
Status: DISCONTINUED | OUTPATIENT
Start: 2017-10-06 | End: 2017-10-06 | Stop reason: HOSPADM

## 2017-10-06 RX ORDER — SODIUM CHLORIDE, SODIUM LACTATE, POTASSIUM CHLORIDE, CALCIUM CHLORIDE 600; 310; 30; 20 MG/100ML; MG/100ML; MG/100ML; MG/100ML
INJECTION, SOLUTION INTRAVENOUS
Status: DISCONTINUED | OUTPATIENT
Start: 2017-10-06 | End: 2017-10-06 | Stop reason: HOSPADM

## 2017-10-06 RX ORDER — CALCIUM GLUCONATE 94 MG/ML
INJECTION, SOLUTION INTRAVENOUS AS NEEDED
Status: DISCONTINUED | OUTPATIENT
Start: 2017-10-06 | End: 2017-10-06 | Stop reason: HOSPADM

## 2017-10-06 RX ORDER — ALBUMIN HUMAN 50 G/1000ML
SOLUTION INTRAVENOUS AS NEEDED
Status: DISCONTINUED | OUTPATIENT
Start: 2017-10-06 | End: 2017-10-06 | Stop reason: HOSPADM

## 2017-10-06 RX ORDER — DEXTROSE 50 % IN WATER (D50W) INTRAVENOUS SYRINGE
12.5-25 AS NEEDED
Status: DISCONTINUED | OUTPATIENT
Start: 2017-10-06 | End: 2017-10-16 | Stop reason: HOSPADM

## 2017-10-06 RX ORDER — ROCURONIUM BROMIDE 10 MG/ML
INJECTION, SOLUTION INTRAVENOUS AS NEEDED
Status: DISCONTINUED | OUTPATIENT
Start: 2017-10-06 | End: 2017-10-06 | Stop reason: HOSPADM

## 2017-10-06 RX ORDER — METRONIDAZOLE 500 MG/100ML
500 INJECTION, SOLUTION INTRAVENOUS
Status: COMPLETED | OUTPATIENT
Start: 2017-10-06 | End: 2017-10-06

## 2017-10-06 RX ORDER — DEXAMETHASONE SODIUM PHOSPHATE 4 MG/ML
INJECTION, SOLUTION INTRA-ARTICULAR; INTRALESIONAL; INTRAMUSCULAR; INTRAVENOUS; SOFT TISSUE AS NEEDED
Status: DISCONTINUED | OUTPATIENT
Start: 2017-10-06 | End: 2017-10-06 | Stop reason: HOSPADM

## 2017-10-06 RX ORDER — FENTANYL CITRATE 50 UG/ML
INJECTION, SOLUTION INTRAMUSCULAR; INTRAVENOUS AS NEEDED
Status: DISCONTINUED | OUTPATIENT
Start: 2017-10-06 | End: 2017-10-06 | Stop reason: HOSPADM

## 2017-10-06 RX ORDER — LIDOCAINE HYDROCHLORIDE 10 MG/ML
0.1 INJECTION, SOLUTION EPIDURAL; INFILTRATION; INTRACAUDAL; PERINEURAL AS NEEDED
Status: DISCONTINUED | OUTPATIENT
Start: 2017-10-06 | End: 2017-10-06 | Stop reason: HOSPADM

## 2017-10-06 RX ORDER — DIPHENHYDRAMINE HYDROCHLORIDE 50 MG/ML
12.5 INJECTION, SOLUTION INTRAMUSCULAR; INTRAVENOUS
Status: DISCONTINUED | OUTPATIENT
Start: 2017-10-06 | End: 2017-10-16 | Stop reason: HOSPADM

## 2017-10-06 RX ORDER — SODIUM CHLORIDE 9 MG/ML
250 INJECTION, SOLUTION INTRAVENOUS AS NEEDED
Status: DISCONTINUED | OUTPATIENT
Start: 2017-10-06 | End: 2017-10-16 | Stop reason: HOSPADM

## 2017-10-06 RX ORDER — SODIUM CHLORIDE, SODIUM LACTATE, POTASSIUM CHLORIDE, CALCIUM CHLORIDE 600; 310; 30; 20 MG/100ML; MG/100ML; MG/100ML; MG/100ML
100 INJECTION, SOLUTION INTRAVENOUS CONTINUOUS
Status: DISCONTINUED | OUTPATIENT
Start: 2017-10-06 | End: 2017-10-06 | Stop reason: HOSPADM

## 2017-10-06 RX ORDER — SUCCINYLCHOLINE CHLORIDE 20 MG/ML
INJECTION INTRAMUSCULAR; INTRAVENOUS AS NEEDED
Status: DISCONTINUED | OUTPATIENT
Start: 2017-10-06 | End: 2017-10-06 | Stop reason: HOSPADM

## 2017-10-06 RX ORDER — ONDANSETRON 2 MG/ML
INJECTION INTRAMUSCULAR; INTRAVENOUS AS NEEDED
Status: DISCONTINUED | OUTPATIENT
Start: 2017-10-06 | End: 2017-10-06 | Stop reason: HOSPADM

## 2017-10-06 RX ORDER — HYDROMORPHONE HYDROCHLORIDE 1 MG/ML
INJECTION, SOLUTION INTRAMUSCULAR; INTRAVENOUS; SUBCUTANEOUS AS NEEDED
Status: DISCONTINUED | OUTPATIENT
Start: 2017-10-06 | End: 2017-10-06 | Stop reason: HOSPADM

## 2017-10-06 RX ORDER — HYDRALAZINE HYDROCHLORIDE 20 MG/ML
10 INJECTION INTRAMUSCULAR; INTRAVENOUS
Status: DISCONTINUED | OUTPATIENT
Start: 2017-10-06 | End: 2017-10-16 | Stop reason: HOSPADM

## 2017-10-06 RX ORDER — FENTANYL CITRATE 50 UG/ML
50 INJECTION, SOLUTION INTRAMUSCULAR; INTRAVENOUS AS NEEDED
Status: DISCONTINUED | OUTPATIENT
Start: 2017-10-06 | End: 2017-10-06 | Stop reason: HOSPADM

## 2017-10-06 RX ORDER — HYDROMORPHONE HYDROCHLORIDE 1 MG/ML
0.2 INJECTION, SOLUTION INTRAMUSCULAR; INTRAVENOUS; SUBCUTANEOUS
Status: DISCONTINUED | OUTPATIENT
Start: 2017-10-06 | End: 2017-10-06 | Stop reason: HOSPADM

## 2017-10-06 RX ORDER — MIDAZOLAM HYDROCHLORIDE 1 MG/ML
1 INJECTION, SOLUTION INTRAMUSCULAR; INTRAVENOUS AS NEEDED
Status: DISCONTINUED | OUTPATIENT
Start: 2017-10-06 | End: 2017-10-06 | Stop reason: HOSPADM

## 2017-10-06 RX ORDER — LIDOCAINE HYDROCHLORIDE 20 MG/ML
INJECTION, SOLUTION EPIDURAL; INFILTRATION; INTRACAUDAL; PERINEURAL AS NEEDED
Status: DISCONTINUED | OUTPATIENT
Start: 2017-10-06 | End: 2017-10-06 | Stop reason: HOSPADM

## 2017-10-06 RX ORDER — PHENYLEPHRINE HCL IN 0.9% NACL 0.4MG/10ML
SYRINGE (ML) INTRAVENOUS AS NEEDED
Status: DISCONTINUED | OUTPATIENT
Start: 2017-10-06 | End: 2017-10-06 | Stop reason: HOSPADM

## 2017-10-06 RX ORDER — SODIUM CHLORIDE 0.9 % (FLUSH) 0.9 %
5-10 SYRINGE (ML) INJECTION EVERY 8 HOURS
Status: DISCONTINUED | OUTPATIENT
Start: 2017-10-06 | End: 2017-10-16 | Stop reason: HOSPADM

## 2017-10-06 RX ORDER — HEPARIN SODIUM 5000 [USP'U]/ML
5000 INJECTION, SOLUTION INTRAVENOUS; SUBCUTANEOUS EVERY 12 HOURS
Status: DISCONTINUED | OUTPATIENT
Start: 2017-10-07 | End: 2017-10-09

## 2017-10-06 RX ORDER — MORPHINE SULFATE 10 MG/ML
2 INJECTION, SOLUTION INTRAMUSCULAR; INTRAVENOUS
Status: DISCONTINUED | OUTPATIENT
Start: 2017-10-06 | End: 2017-10-06 | Stop reason: HOSPADM

## 2017-10-06 RX ORDER — SODIUM CHLORIDE, SODIUM LACTATE, POTASSIUM CHLORIDE, CALCIUM CHLORIDE 600; 310; 30; 20 MG/100ML; MG/100ML; MG/100ML; MG/100ML
150 INJECTION, SOLUTION INTRAVENOUS CONTINUOUS
Status: DISCONTINUED | OUTPATIENT
Start: 2017-10-06 | End: 2017-10-09

## 2017-10-06 RX ORDER — FENTANYL CITRATE 50 UG/ML
25 INJECTION, SOLUTION INTRAMUSCULAR; INTRAVENOUS
Status: DISCONTINUED | OUTPATIENT
Start: 2017-10-06 | End: 2017-10-06 | Stop reason: HOSPADM

## 2017-10-06 RX ORDER — MIDAZOLAM HYDROCHLORIDE 1 MG/ML
0.5 INJECTION, SOLUTION INTRAMUSCULAR; INTRAVENOUS
Status: DISCONTINUED | OUTPATIENT
Start: 2017-10-06 | End: 2017-10-06 | Stop reason: HOSPADM

## 2017-10-06 RX ORDER — MAGNESIUM SULFATE 100 %
4 CRYSTALS MISCELLANEOUS AS NEEDED
Status: DISCONTINUED | OUTPATIENT
Start: 2017-10-06 | End: 2017-10-16 | Stop reason: HOSPADM

## 2017-10-06 RX ORDER — VANCOMYCIN 1.75 GRAM/500 ML IN 0.9 % SODIUM CHLORIDE INTRAVENOUS
1750 ONCE
Status: COMPLETED | OUTPATIENT
Start: 2017-10-06 | End: 2017-10-09

## 2017-10-06 RX ORDER — ROPIVACAINE HYDROCHLORIDE 5 MG/ML
150 INJECTION, SOLUTION EPIDURAL; INFILTRATION; PERINEURAL AS NEEDED
Status: DISCONTINUED | OUTPATIENT
Start: 2017-10-06 | End: 2017-10-09 | Stop reason: HOSPADM

## 2017-10-06 RX ORDER — INSULIN LISPRO 100 [IU]/ML
INJECTION, SOLUTION INTRAVENOUS; SUBCUTANEOUS EVERY 6 HOURS
Status: DISCONTINUED | OUTPATIENT
Start: 2017-10-06 | End: 2017-10-14

## 2017-10-06 RX ORDER — PROPOFOL 10 MG/ML
INJECTION, EMULSION INTRAVENOUS AS NEEDED
Status: DISCONTINUED | OUTPATIENT
Start: 2017-10-06 | End: 2017-10-06 | Stop reason: HOSPADM

## 2017-10-06 RX ADMIN — ALBUMIN HUMAN 250 ML: 50 SOLUTION INTRAVENOUS at 10:56

## 2017-10-06 RX ADMIN — ROCURONIUM BROMIDE 10 MG: 10 INJECTION, SOLUTION INTRAVENOUS at 11:15

## 2017-10-06 RX ADMIN — Medication 40 MCG: at 08:00

## 2017-10-06 RX ADMIN — Medication 40 MCG: at 08:38

## 2017-10-06 RX ADMIN — FENTANYL CITRATE 50 MCG: 50 INJECTION, SOLUTION INTRAMUSCULAR; INTRAVENOUS at 11:30

## 2017-10-06 RX ADMIN — CALCIUM GLUCONATE 1 G: 94 INJECTION, SOLUTION INTRAVENOUS at 12:21

## 2017-10-06 RX ADMIN — ROCURONIUM BROMIDE 40 MG: 10 INJECTION, SOLUTION INTRAVENOUS at 08:06

## 2017-10-06 RX ADMIN — SODIUM CHLORIDE, SODIUM LACTATE, POTASSIUM CHLORIDE, CALCIUM CHLORIDE: 600; 310; 30; 20 INJECTION, SOLUTION INTRAVENOUS at 07:45

## 2017-10-06 RX ADMIN — FENTANYL CITRATE 50 MCG: 50 INJECTION, SOLUTION INTRAMUSCULAR; INTRAVENOUS at 07:21

## 2017-10-06 RX ADMIN — Medication 40 MCG: at 08:39

## 2017-10-06 RX ADMIN — SODIUM CHLORIDE, SODIUM LACTATE, POTASSIUM CHLORIDE, AND CALCIUM CHLORIDE 150 ML/HR: 600; 310; 30; 20 INJECTION, SOLUTION INTRAVENOUS at 16:21

## 2017-10-06 RX ADMIN — Medication 10 ML: at 21:53

## 2017-10-06 RX ADMIN — INSULIN LISPRO 3 UNITS: 100 INJECTION, SOLUTION INTRAVENOUS; SUBCUTANEOUS at 18:39

## 2017-10-06 RX ADMIN — FENTANYL CITRATE 25 MCG: 50 INJECTION, SOLUTION INTRAMUSCULAR; INTRAVENOUS at 10:30

## 2017-10-06 RX ADMIN — FENTANYL CITRATE 50 MCG: 50 INJECTION, SOLUTION INTRAMUSCULAR; INTRAVENOUS at 08:01

## 2017-10-06 RX ADMIN — FENTANYL CITRATE 25 MCG: 50 INJECTION, SOLUTION INTRAMUSCULAR; INTRAVENOUS at 11:32

## 2017-10-06 RX ADMIN — HYDROMORPHONE HYDROCHLORIDE 0.2 MG: 1 INJECTION, SOLUTION INTRAMUSCULAR; INTRAVENOUS; SUBCUTANEOUS at 09:29

## 2017-10-06 RX ADMIN — MORPHINE SULFATE 3.5 MG: 0.5 INJECTION, SOLUTION EPIDURAL; INTRATHECAL; INTRAVENOUS at 08:16

## 2017-10-06 RX ADMIN — ROCURONIUM BROMIDE 15 MG: 10 INJECTION, SOLUTION INTRAVENOUS at 09:44

## 2017-10-06 RX ADMIN — SODIUM CHLORIDE, SODIUM LACTATE, POTASSIUM CHLORIDE, CALCIUM CHLORIDE: 600; 310; 30; 20 INJECTION, SOLUTION INTRAVENOUS at 12:23

## 2017-10-06 RX ADMIN — SODIUM CHLORIDE, SODIUM LACTATE, POTASSIUM CHLORIDE, AND CALCIUM CHLORIDE 100 ML/HR: 600; 310; 30; 20 INJECTION, SOLUTION INTRAVENOUS at 07:37

## 2017-10-06 RX ADMIN — Medication 40 MCG: at 08:35

## 2017-10-06 RX ADMIN — ROCURONIUM BROMIDE 10 MG: 10 INJECTION, SOLUTION INTRAVENOUS at 08:01

## 2017-10-06 RX ADMIN — ONDANSETRON 4 MG: 2 INJECTION INTRAMUSCULAR; INTRAVENOUS at 13:56

## 2017-10-06 RX ADMIN — HYDROMORPHONE HYDROCHLORIDE 0.2 MG: 1 INJECTION, SOLUTION INTRAMUSCULAR; INTRAVENOUS; SUBCUTANEOUS at 09:18

## 2017-10-06 RX ADMIN — SODIUM CHLORIDE, SODIUM LACTATE, POTASSIUM CHLORIDE, CALCIUM CHLORIDE: 600; 310; 30; 20 INJECTION, SOLUTION INTRAVENOUS at 13:15

## 2017-10-06 RX ADMIN — ROCURONIUM BROMIDE 30 MG: 10 INJECTION, SOLUTION INTRAVENOUS at 08:30

## 2017-10-06 RX ADMIN — Medication 40 MCG: at 08:43

## 2017-10-06 RX ADMIN — ALBUMIN HUMAN 250 ML: 50 SOLUTION INTRAVENOUS at 11:57

## 2017-10-06 RX ADMIN — MIDAZOLAM HYDROCHLORIDE 2 MG: 1 INJECTION, SOLUTION INTRAMUSCULAR; INTRAVENOUS at 07:49

## 2017-10-06 RX ADMIN — Medication 80 MCG: at 08:15

## 2017-10-06 RX ADMIN — ALBUMIN HUMAN 250 ML: 50 SOLUTION INTRAVENOUS at 08:34

## 2017-10-06 RX ADMIN — SODIUM CHLORIDE, SODIUM LACTATE, POTASSIUM CHLORIDE, AND CALCIUM CHLORIDE 150 ML/HR: 600; 310; 30; 20 INJECTION, SOLUTION INTRAVENOUS at 21:30

## 2017-10-06 RX ADMIN — Medication 80 MCG: at 08:36

## 2017-10-06 RX ADMIN — ALBUMIN HUMAN 250 ML: 50 SOLUTION INTRAVENOUS at 08:06

## 2017-10-06 RX ADMIN — Medication 40 MCG: at 08:17

## 2017-10-06 RX ADMIN — MORPHINE SULFATE 0.5 MG: 0.5 INJECTION, SOLUTION EPIDURAL; INTRATHECAL; INTRAVENOUS at 08:29

## 2017-10-06 RX ADMIN — PROPOFOL 130 MG: 10 INJECTION, EMULSION INTRAVENOUS at 08:01

## 2017-10-06 RX ADMIN — ROCURONIUM BROMIDE 20 MG: 10 INJECTION, SOLUTION INTRAVENOUS at 10:40

## 2017-10-06 RX ADMIN — Medication 80 MCG: at 08:02

## 2017-10-06 RX ADMIN — VANCOMYCIN HYDROCHLORIDE 1.75 G: 10 INJECTION, POWDER, LYOPHILIZED, FOR SOLUTION INTRAVENOUS at 08:00

## 2017-10-06 RX ADMIN — Medication 7 ML/HR: at 10:37

## 2017-10-06 RX ADMIN — SODIUM CHLORIDE, SODIUM LACTATE, POTASSIUM CHLORIDE, CALCIUM CHLORIDE: 600; 310; 30; 20 INJECTION, SOLUTION INTRAVENOUS at 13:30

## 2017-10-06 RX ADMIN — ALBUMIN HUMAN 250 ML: 50 SOLUTION INTRAVENOUS at 11:28

## 2017-10-06 RX ADMIN — ROCURONIUM BROMIDE 15 MG: 10 INJECTION, SOLUTION INTRAVENOUS at 09:27

## 2017-10-06 RX ADMIN — ROCURONIUM BROMIDE 10 MG: 10 INJECTION, SOLUTION INTRAVENOUS at 13:11

## 2017-10-06 RX ADMIN — Medication 10 ML: at 18:00

## 2017-10-06 RX ADMIN — FAMOTIDINE 20 MG: 10 INJECTION, SOLUTION INTRAVENOUS at 21:49

## 2017-10-06 RX ADMIN — Medication 80 MCG: at 08:30

## 2017-10-06 RX ADMIN — ROCURONIUM BROMIDE 20 MG: 10 INJECTION, SOLUTION INTRAVENOUS at 12:00

## 2017-10-06 RX ADMIN — ROCURONIUM BROMIDE 10 MG: 10 INJECTION, SOLUTION INTRAVENOUS at 13:54

## 2017-10-06 RX ADMIN — Medication 40 MCG: at 08:31

## 2017-10-06 RX ADMIN — Medication 80 MCG: at 08:44

## 2017-10-06 RX ADMIN — SUCCINYLCHOLINE CHLORIDE 200 MG: 20 INJECTION INTRAMUSCULAR; INTRAVENOUS at 08:01

## 2017-10-06 RX ADMIN — Medication 80 MCG: at 08:45

## 2017-10-06 RX ADMIN — METRONIDAZOLE 500 MG: 500 INJECTION, SOLUTION INTRAVENOUS at 08:00

## 2017-10-06 RX ADMIN — ALBUMIN HUMAN 250 ML: 50 SOLUTION INTRAVENOUS at 11:45

## 2017-10-06 RX ADMIN — DIPHENHYDRAMINE HYDROCHLORIDE 12.5 MG: 50 INJECTION, SOLUTION INTRAMUSCULAR; INTRAVENOUS at 21:49

## 2017-10-06 RX ADMIN — SODIUM CHLORIDE: 9 INJECTION, SOLUTION INTRAVENOUS at 14:24

## 2017-10-06 RX ADMIN — DEXAMETHASONE SODIUM PHOSPHATE 4 MG: 4 INJECTION, SOLUTION INTRA-ARTICULAR; INTRALESIONAL; INTRAMUSCULAR; INTRAVENOUS; SOFT TISSUE at 08:30

## 2017-10-06 RX ADMIN — LIDOCAINE HYDROCHLORIDE 40 MG: 20 INJECTION, SOLUTION EPIDURAL; INFILTRATION; INTRACAUDAL; PERINEURAL at 08:01

## 2017-10-06 RX ADMIN — SODIUM CHLORIDE, SODIUM LACTATE, POTASSIUM CHLORIDE, AND CALCIUM CHLORIDE 150 ML/HR: 600; 310; 30; 20 INJECTION, SOLUTION INTRAVENOUS at 16:26

## 2017-10-06 RX ADMIN — LIDOCAINE HYDROCHLORIDE 60 MG: 20 INJECTION, SOLUTION EPIDURAL; INFILTRATION; INTRACAUDAL; PERINEURAL at 08:02

## 2017-10-06 RX ADMIN — GENTAMICIN SULFATE 440 MG: 40 INJECTION, SOLUTION INTRAMUSCULAR; INTRAVENOUS at 08:16

## 2017-10-06 RX ADMIN — Medication 40 MCG: at 08:41

## 2017-10-06 RX ADMIN — SODIUM CHLORIDE, SODIUM LACTATE, POTASSIUM CHLORIDE, CALCIUM CHLORIDE: 600; 310; 30; 20 INJECTION, SOLUTION INTRAVENOUS at 10:44

## 2017-10-06 RX ADMIN — KETAMINE HYDROCHLORIDE 50 MG: 10 INJECTION, SOLUTION INTRAMUSCULAR; INTRAVENOUS at 08:06

## 2017-10-06 RX ADMIN — PROPOFOL 50 MG: 10 INJECTION, EMULSION INTRAVENOUS at 13:54

## 2017-10-06 RX ADMIN — VANCOMYCIN HYDROCHLORIDE 1750 MG: 10 INJECTION, POWDER, LYOPHILIZED, FOR SOLUTION INTRAVENOUS at 07:39

## 2017-10-06 NOTE — OP NOTES
[address]   [address], [address]   OP NOTE       Name:  Tomas Rivas   MR#:  [id number]   :  1955   Account #:  [de-identified]    Surgery Date:  10/06/2017   Date of Adm:  10/06/2017       PREOPERATIVE DIAGNOSIS: Distal bile duct cholangiocarcinoma. POSTOPERATIVE DIAGNOSIS: Distal bile duct cholangiocarcinoma. PROCEDURES PERFORMED: Pylorus-preserving Whipple   procedure. SURGEON: Mary Spicer MD.    ASSISTANT SURGEON: Dr. Christa Medina. Dr. Ron Chang assistance   was required throughout the operation secondary to the complex   nature of this case and for assistance with exposure and dissection. ANESTHESIA: General.    ESTIMATED BLOOD LOSS: 1 liter. SPECIMENS REMOVED:   ID Type Source Tests Collected by Time Destination   1 : falsiform ligament Fresh Tissue   Beatriz Ramirez MD 10/6/2017 0840 Pathology   2 : gallbladder Fresh Gallbladder   Beatriz Ramirez MD 10/6/2017 0900 Pathology   3 : portal lymph nodes, cystic duct Fresh Lymph Node   Beatriz Ramirez MD 10/6/2017 2246 Pathology   4 : bile duct margin Frozen Section Tissue   Beatriz Ramirez MD 10/6/2017 1018 Pathology   5 : Whipple specimen Fresh Pancreas   Beatriz Ramirez MD 10/6/2017 1208 Pathology   6 : omentum Fresh Tissue   Beatriz Ramirez MD 10/6/2017 1218 Pathology   7 : duodenal margin Fresh Tissue   Beatriz Ramirez MD 10/6/2017 1402 Pathology   1 : bile duct culture Tissue Liver AEROBIC/ANAEROBIC CULTURE Beatriz Ramirez MD 10/6/2017 1019 Microbiology           FINDINGS: The patient had a mass at the level of the distal bile duct. There was a focal area of adherence to the portal vein. The bile duct   margin was sent for frozen analysis and was noted to be negative. INDICATIONS: The patient is a 23-year-old male who presented   initially with jaundice. Workup of this demonstrated an adenocarcinoma   from biopsy within the bile duct.  He had a complete workup with   imaging that demonstrated no evidence of visible tumor on imaging or   evidence of metastatic disease. The patient did have a metal bile duct   stent placed and developed sepsis secondary to cholangitis after this   was completed, and due to this we did delay his operation to allow the   inflammation to resolve. The patient's findings were consistent with a   distal cholangiocarcinoma. A complete discussion of risks, benefits,   and alternatives to surgery were had with the patient and he was in   agreement to proceed. Informed consent was obtained. DESCRIPTION OF OPERATION: After informed consent was   obtained, the patient was brought back to the operating room, placed   under general endotracheal anesthesia. He had an epidural catheter   placed prior to this in the preoperative holding area. The patient was   then prepped and draped in the usual sterile fashion and a proper   time-out was performed. He had a Vega catheter placed prior to this   as well as an OG tube. Once this was completed, we made an upper   midline incision from the xiphoid process down to just above the level   of the umbilicus. This was dissected down through the subcutaneous   tissue down to the midline fascia. This was divided using   electrocautery. We identified the peritoneum, grasped this and   retracted anteriorly and entered this sharply. The abdomen was   entered safely. We opened this along the entirety of the length of the   incision. Once this was opened, we palpated within the abdomen,   including a visual inspection. We palpated the surfaces of the liver as   well as the anterior abdominal wall. The omentum and intestines were   all inspected. There was no evidence of obvious metastatic disease. We then palpated the area of the lesion. This was felt to be mobile and   thus likely to be resectable. At this time, we set up a Moon   retractor system in place to provide exposure.  We began by mobilizing   the hepatic flexure of the right colon and opening the lesser sac by   dividing the plane between the transverse colon mesentery and the   omentum. This was done down to the level of the superior mesenteric   vein. Once this was identified and our colon had been adequately   mobilized, we then performed a full Kocher procedure by mobilizing the   duodenum off of the inferior vena cava and over to expose the   contralateral border of the aorta. Once our Kocher maneuver was   completed, we then turned our attention to the gallbladder. The   gallbladder was dissected down off of the liver in a dome-down   technique. We dissected down, identified the cystic artery. This was   taken using a LigaSure device. This left the only remaining structure to   be the cystic duct. We tied this off using an 0 silk suture and then   clipped and divided this. The gallbladder was passed off for specimen. We then began our dissection of the teri hepatis. We began this by   lifting off the portal lymph node and nerve tissue along the anterior   surface just cranial to the duodenum. This  off of the bile duct   without difficulty. We dissected the anterior and right lateral   components of this to separate this lymphatic tissue away from the bile   duct and sent this as a separate specimen of portal lymph nodes. The   cystic duct stump was divided at the level of the bile duct, in this case   after being tied again with a silk suture and this was sent off as a   specimen. We then dissected circumferentially around the bile duct. This was able to be  from the portal vein without significant   difficulty up at this level. A vessel loop was placed around this. This   allowed us to continue our dissection more proximally. We then   identified the hepatic artery.  The patient had a very aberrant course for   his common hepatic artery that appeared to be traversing somewhat   through the body of the pancreas and ultimately upon dissecting this,   we were able to identify the right gastric artery and then the   gastroduodenal artery. These were both dissected free   circumferentially and then divided using 2-0 silk ties and metal clips on   the patient's side and a 2-0 silk tie on the specimen side. Once these   were divided, we were able to identify the portal vein as it coursed   underneath the pancreas. We continued our dissection down in this   area and it seemed that the portal vein would separate from the bile   duct without significant difficulty in this area. We then dissected along   the surface of the portal vein and superior mesenteric vein underneath   the neck of the pancreas and were able to create a plane. We passed   a vessel loop underneath this. Once this was completed, we then   dissected down around the first portion of the duodenum. We decided   to perform a pylorus-preserving Whipple as this area appeared   uninvolved with the tumor. We dissected out and divided the right   gastroepiploic pedicle at its base. This completed and freed up the   distal stomach as well as the first portion of the duodenum. This was   then divided using a purple Endo-DALJIT 60 mm staple load. The stomach   was then rolled back out of the way into the left upper quadrant. We then turned our attention to dividing the jejunum. We lifted the   transverse colon and identified the ligament of Treitz. Approximately 15   cm distal to this, we found an area of small intestine that felt like it   would reach appropriately for reconstruction. We made a window in the   mesentery and divided this bowel using a 60 mm purple Endo-DALJIT   staple load. We then used a LigaSure device to dissect along the   proximal jejunum down to the fourth portion of the duodenum, dividing   the ligament of Treitz. This was done carefully to avoid any injury to the   IMV as well as the SMV. This included dissection of a portion of the   uncinate process off of the duodenum.  Once this was completely freed,   we were able to pass the proximal jejunum behind the fourth portion of   the duodenum and dropped the colon back down. We rotated our   specimen out into the right upper quadrant. With this completed, we   then began dissecting the uncinate process off of the SMV portal vein   confluence as well as the superior mesenteric artery. As we were   dissecting in this area, we did identify that there was some tethering of   the bile duct to the portal vein. We then divided the pancreas at the   neck overlying the SMV portal vein confluence. This was done after   placing 2-0 silk stay sutures in the corners of the pancreas and using   electrocautery to divide the pancreas. We used cut on the Bovie when   coming through the pancreatic duct. Once the pancreas was divided,   we were able to continue dissecting along the portal vein to separate   the specimen away from this. There was one area associated with a   branch of the portal vein that appeared to be tethered with the tumor. In dissecting this, we did create a hole in the portal vein and had some   significant bleeding from this. This was controlled initially with 5-0   figure-of-eight Prolene suture, but continued dissection in this area   reaggravated the bleeding. We controlled this by holding pressure and   dissected free the remainder of the specimen. Once this was   completed, we placed a straight Satinsky clamp on the edge of the   portal vein at the site where the tethering and injury had occurred. This   was the final area of connection of the tumor. This was then resected   using a 15 blade scalpel. The specimen was passed off for pathologic   review. I did neglect to mention that upon dividing the bile duct I did send a   margin of the bile duct for frozen analysis and this was negative for any   evidence of tumor. We did place a bulldog clamp on the bile duct at   that time and did culture the bile.  With our Satinsky clamp in place, we   used a 4-0 Prolene suture to perform a horizontal mattress closure of   the defects. We sewed down the length of the Satinsky clamp and then   removed the clamp. This was hemostatic, but we did run a normal   running baseball-type stitch back over this, back towards the initial   portion of the suture and then tied this down. This was nicely   hemostatic. There was no further evidence of bleeding at this time. The   estimated blood loss for the case due to this was approximately 1 liter   but the patient did remain hemodynamically stable throughout and   continued to make good urine. He did not require transfusion. We   irrigated at this time and noted this area to be nicely hemostatic. Our   specimen had been removed and passed off to Pathology. We then began to set up for the reconstruction. We did this in a   standard format by taking the divided portion of the jejunum and   bringing this through a new window that was made in the base of the   transverse colon mesentery to the right of the middle colic vein and   artery. This created a normal C-loop configuration that was without   tension. We lined this up with the cut edge of the pancreas. A coronary   dilator was used to identify the pancreatic duct. The texture of the   pancreas was soft and the duct was approximately 2 mm in size. We   performed at this point an end-to-side Blumgart style   pancreaticojejunostomy with a duct-to-mucosa anastomosis. This was   done by using 2-0 Prolene sutures to fixate the cut edge of the   pancreas to the jejunum in a dunking style fashion. A small enterotomy   was made in the side of the duodenum and the mucosa was tagged to   the serosa using 4 interrupted 6-0 Prolene sutures. We then used 4-0   PDS sutures x6 to create a duct-to-mucosa anastomosis. The 2-0   Prolene sutures were then secured to the anterior surface of the   jejunum to dunk the entirety of the pancreatic anastomosis.  This   provided a nice closure. We then turned our attention to the   hepaticojejunostomy. The bulldog clamp was removed. Bile was   suctioned free. We created a small opening again in the jejunum   approximately 10 cm distal to the pancreaticojejunostomy. The mucosa   was again tacked to the serosa on the jejunum using 4 interrupted 6-0   Prolene sutures. We then created a hepaticojejunostomy by placing 4-  0 PDS sutures as corner stay sutures. We then did a running posterior   row with 4-0 PDS suture and then interrupted anterior row with 4-0   PDS sutures. All knots were left externally. This provided a nice   approximation and there was no evidence of bile leakage. We then   fixated the jejunum as it passed through the transverse colon   mesentery using 3 separate 4-0 interrupted PDS sutures to help   minimize any risk of herniation. We then created an end-to-side hand-  sewn duodenojejunostomy after removing the staple line from our   previous transection through the first portion of the duodenum. This   was done in 2 layers using a back row of Lembert style 3-0 silk   sutures, followed by an inner layer of running 4-0 PDS suture on the   posterior row with Abebe type closure along the anterior row. The   anterior surface was then reinforced using 3-0 silk sutures in a Lembert   style fashion. The OG tube had been removed at this point in time. The   anastomoses were all checked. There was no evidence of leakage and   the bowel all appeared healthy. We again copiously irrigated at this   time. Drains were then placed. These were 19-Nepali Boris drains. A   lateral drain was placed in the area of Rivero's pouch along the right   pericolic gutter and posterior to the hepaticojejunostomy. The more   medial drain was placed  inferior and medial to the   pancreaticojejunostomy and posterior to the duodenojejunostomy. These drains were secured in place at the level of skin using 2-0 nylon   sutures.  These were then placed to bulb suction. We then inspected   and all needle and instrument counts were correct. There was no   evidence of any retained materials. We removed the retraction system   at this time and closed the incision using a double-stranded #1 PDS   suture in running fashion. The incision was copiously irrigated and then   the deep dermis was brought together using interrupted 3-0 Vicryl   sutures. The skin was reapproximated using a 4-0 Monocryl   subcuticular stitch, followed by Dermabond skin adhesive. Again, all   needle and instrument counts were correct at the completion of the   case. I was present and scrubbed throughout the entirety of the case. There was more blood loss than expected secondary to an injury in the   portal vein. This was repaired during the operation. The patient   tolerated the procedure well and was taken to the post-anesthesia care   unit in stable condition. All needle and instrument counts again were   correct at the completion of the case. COMPLICATIONS: There was a small amount of bleeding from the   portal vein during resection, no transfusion was required. IMPLANTS: None. DISPOSITION: PACU.         MD TIANNA Suárez / Andrew Arana   D:  10/06/2017   17:52   T:  10/06/2017   19:05   Job #:  [id number]

## 2017-10-06 NOTE — PROGRESS NOTES
1640 received verbal report from catie Hernandez rn (pacu rn)  1700 pt in ICU connected to ICU monitors assessment done drains checked  1730 wife in with pt  Primary Nurse Carter Paulino, RN and jeff pemberton RN performed a dual skin assessment on this patient No impairment noted  Jeet score is 17  1900 shift summary: pt comfortable - taking a few ice chips - wife in room - urine output good- 2 CHRIS in placed patent and charged-abd incisonal area cleaned and intact- epidural site intact- pt is able to move from side to side with minimal help  Bedside and Verbal shift change report given to alessio jade (oncoming nurse) by Angella Sim (offgoing nurse). Report included the following information SBAR, Kardex, OR Summary, Procedure Summary, Intake/Output, MAR and Recent Results.

## 2017-10-06 NOTE — PERIOP NOTES
Patient: Talya Mcdaniel MRN: [id number]  SSN: [id number]   YOB: 1955  Age: 65 y.o. Sex: male     Patient is status post Procedure(s):  WHIPPLE PROCEDURE . Surgeon(s) and Role:     * Lawrence Fuentes MD - Primary     * Wilian Trujillo MD - Assisting    Local/Dose/Irrigation:                    Peripheral IV 10/06/17 Right Arm (Active)       Peripheral IV 10/06/17 Left Wrist (Active)          Liane Bound #1 10/06/17 Right; Lower Abdomen (Active)   Site Assessment Clean, dry, & intact 10/6/2017  2:31 PM   Dressing Status Clean, dry, & intact 10/6/2017  2:31 PM       Liane Bound #2 10/06/17 Right; Lower Abdomen (Active)   Site Assessment Clean, dry, & intact 10/6/2017  2:31 PM   Dressing Status Clean, dry, & intact 10/6/2017  2:31 PM      Airway - Endotracheal Tube 10/06/17 Oral (Active)        Epidural/Intrathecal 10/06/17 Thoracic spine (comment) (Active)               Dressing/Packing:  Wound Abdomen Medial-DRESSING TYPE: Topical skin adhesive/glue (10/06/17 6979)  Splint/Cast:  ]    Other:

## 2017-10-06 NOTE — ANESTHESIA PROCEDURE NOTES
Epidural Block    Performed by: Zachary Andrade  Authorized by: Zachary Andrade     Pre-Procedure  Indication: at surgeon's request and post-op pain management    Preanesthetic Checklist: patient identified, risks and benefits discussed, anesthesia consent, site marked, patient being monitored, timeout performed and anesthesia consent      Epidural:   Patient position:  Seated  Prep region:  Thoracic  Prep: Chlorhexidine    Location:  T10-11    Needle and Epidural Catheter:   Needle Type:  Tuohy  Needle Gauge:  17 G  Injection Technique:  Loss of resistance using saline  Attempts:  1  Catheter Size:  19 G  Events: no blood with aspiration, no cerebrospinal fluid with aspiration, no paresthesia and negative aspiration test    Test Dose:  Negative and lidocaine 1.5% w/ epi    Assessment:   Catheter Secured:  Tegaderm and tape  Insertion:  Uncomplicated  Patient tolerance:  Patient tolerated the procedure well with no immediate complications

## 2017-10-06 NOTE — PERIOP NOTES
TRANSFER - OUT REPORT:    Verbal report given to Sera(name) on Mal Numbers  being transferred to ICU 7(unit) for routine post - op       Report consisted of patients Situation, Background, Assessment and   Recommendations(SBAR). Time Pre op antibiotic given:0800, 0800, 081`6  Anesthesia Stop time: 1520  Vega Present on Transfer to floor:yes  Order for Vega on Chart:yes    Information from the following report(s) Procedure Summary and Accordion was reviewed with the receiving nurse. Opportunity for questions and clarification was provided. Is the patient on 02? YES       L/Min 2    Is the patient on a monitor? YES    Is the nurse transporting with the patient? YES    Surgical Waiting Area notified of patient's transfer from PACU?  YES      The following personal items collected during your admission accompanied patient upon transfer:   Dental Appliance: Dental Appliances: None  Vision: Visual Aid: Glasses, At home  Hearing Aid:    Jewelry:    Clothing:    Other Valuables:    Valuables sent to safe:    1 bag of clothing reeturned to pt

## 2017-10-06 NOTE — ANESTHESIA POSTPROCEDURE EVALUATION
Post-Anesthesia Evaluation and Assessment    Patient: Evelin Bernal MRN: [id number]  SSN: [id number]    YOB: 1955  Age: 65 y.o. Sex: male       Cardiovascular Function/Vital Signs  Visit Vitals    /62    Pulse 97    Temp 36.8 °C (98.2 °F)    Resp 12    Ht 5' 8\" (1.727 m)    Wt 88 kg (194 lb)    SpO2 96%    BMI 29.5 kg/m2       Patient is status post general anesthesia for Procedure(s):  WHIPPLE PROCEDURE . Nausea/Vomiting: None    Postoperative hydration reviewed and adequate. Pain:  Pain Scale 1: Numeric (0 - 10) (10/06/17 1255)  Pain Intensity 1: 0 (10/06/17 0702)   Managed    Neurological Status:   Neuro (WDL): Within Defined Limits (10/06/17 0700)   At baseline    Mental Status and Level of Consciousness: Arousable    Pulmonary Status:   O2 Device: Nasal cannula (10/06/17 1522)   Adequate oxygenation and airway patent    Complications related to anesthesia: None    Post-anesthesia assessment completed.  No concerns    Signed By: Luis Gilliam MD     October 6, 2017

## 2017-10-06 NOTE — ANESTHESIA PREPROCEDURE EVALUATION
Anesthetic History   No history of anesthetic complications            Review of Systems / Medical History  Patient summary reviewed, nursing notes reviewed and pertinent labs reviewed    Pulmonary  Within defined limits                 Neuro/Psych   Within defined limits           Cardiovascular    Hypertension        Dysrhythmias            GI/Hepatic/Renal  Within defined limits              Endo/Other  Within defined limits           Other Findings              Physical Exam    Airway  Mallampati: II  TM Distance: > 6 cm  Neck ROM: normal range of motion   Mouth opening: Normal     Cardiovascular  Regular rate and rhythm,  S1 and S2 normal,  no murmur, click, rub, or gallop             Dental  No notable dental hx       Pulmonary  Breath sounds clear to auscultation               Abdominal  GI exam deferred       Other Findings            Anesthetic Plan    ASA: 2  Anesthesia type: general    Monitoring Plan: CVP  Post-op pain plan if not by surgeon: regional    Induction: Intravenous  Anesthetic plan and risks discussed with: Patient

## 2017-10-06 NOTE — H&P
Date of Surgery Update:  Johanna Mcmanus was seen and examined. History and physical has been reviewed. The patient has been examined. There have been no significant clinical changes since the completion of the originally dated History and Physical.  Patient denies any recent fevers or jaundice. Plan again reviewed with the patient. A complete discussion of the risks, benefits and alternatives to surgery were discussed with the patient who was keen to proceed. Plan for whipple with bile duct resection for distal cholangiocarcinoma. Signed By: Julio Quevedo MD     October 6, 2017 7:18 AM         Please note from the office and include the additional information below:    Past Medical History  Past Medical History:   Diagnosis Date    Arrhythmia     Previous a.fib, ablation, NSR now; NO LONGER FOLLOWED BY CARDIOLOGIST.  Autoimmune disease ([address]     SJOGREN'S    Cancer ([address]     COMMON BILE DUCT, ADENOCARCINOMA    Hypertension         Past Surgical History  Past Surgical History:   Procedure Laterality Date    HX GI      COLONOSCOPY, POLYPS (BENIGN)    HX HEART CATHETERIZATION  2005    Ablation     HX ORTHOPAEDIC  2000    Spinal fusion W/ HARDWARE    NEUROLOGICAL PROCEDURE UNLISTED  2005    Ting hole washout from cerebral hemorrhage        Social History  The patient Johanna Mcmanus  reports that he has never smoked. He has never used smokeless tobacco. He reports that he drinks alcohol. He reports that he does not use illicit drugs.      Family History  Family History   Problem Relation Age of Onset    Cancer Father      Breast and Colon    Cancer Mother      LEUKEMIA    Anesth Problems Neg Hx           Julio Quevedo MD

## 2017-10-06 NOTE — BRIEF OP NOTE
BRIEF OPERATIVE NOTE    Date of Procedure: 10/6/2017   Preoperative Diagnosis: DISTAL CHOLANGIOCARCINOMA   Postoperative Diagnosis: DISTAL CHOLANGIOCARCINOMA  Procedure(s):  PYLORUS PRESERVING WHIPPLE PROCEDURE   Surgeon(s) and Role:     * Maribel Irene MD - Primary     * Katheryn Solis MD - Assisting         Assistant Staff:       Surgical Staff:  Circ-1: Jaquelin Villalpando RN  Circ-2: Kiko Kline  Circ-Relief: Amanda Nolasco  Scrub Tech-1: Elysia Servin  Scrub Tech-Relief: Tomeka Plata  Scrub RN-Relief: Oseas Lloyd RN  Surg Asst-1: Hal Appiah  Float Staff: Genet García; Jeffry Muhammad RN  Event Time In   Incision Start 6143   Incision Close      Anesthesia: General   Estimated Blood Loss: 1000 mL  Specimens:   ID Type Source Tests Collected by Time Destination   1 : falsiform ligament Fresh Tissue  Maribel Irene MD 10/6/2017 0840 Pathology   2 : gallbladder Fresh Gallbladder  Maribel Irene MD 10/6/2017 0900 Pathology   3 : portal lymph nodes, cystic duct Fresh Lymph Node  Maribel Irene MD 10/6/2017 4391 Pathology   4 : bile duct margin Frozen Section Tissue  Maribel Irene MD 10/6/2017 1018 Pathology   5 : Pancreas Duodenum Fresh Pancreas  Maribel Irene MD 10/6/2017 1208 Pathology   6 : omentum Fresh Tissue  Mariebl Irene MD 10/6/2017 1218 Pathology   7 : duodenal margin Fresh Tissue  Maribel Irene MD 10/6/2017 1402 Pathology   1 : bile duct culture Tissue Liver AEROBIC/ANAEROBIC CULTURE Maribel Irene MD 10/6/2017 1019 Microbiology      Findings: Tumor of distal bile duct with focal area of adherence to portal vein. Bile duct margin negative on frozen section   Complications: Bleeding from portal vein during resection. No transfusion required.     Implants: * No implants in log *

## 2017-10-06 NOTE — IP AVS SNAPSHOT
[address] [facility] [address] 
[phone] Patient: Jaden Zabala MRN: [id number] VWI:[birth year] You are allergic to the following Allergen Reactions Other Food Anaphylaxis SOFT SHELL CRABS Pcn (Penicillins) Other (comments) Pt stated \"always been told PCN\" Recent Documentation Height Weight BMI Smoking Status 1.727 m 89.8 kg 30.1 kg/m2 Never Smoker Emergency Contacts Name Discharge Info Relation Home Work Mobile Hyun Cristina DISCHARGE CAREGIVER [3] Spouse [3] [phone] AkhilclovisRoberto DISCHARGE CAREGIVER [3] Child [2] [phone] Ridge Cristina III DISCHARGE CAREGIVER [3] Child [2] [phone] About your hospitalization You were admitted on:  October 6, 2017 You last received care in the:  [facility] 2N MED SURG You were discharged on:  October 16, 2017 Unit phone number:  [phone] Why you were hospitalized Your primary diagnosis was:  Not on File Your diagnoses also included:  Cholangiocarcinoma Determined By Biopsy Of Biliary Tract (Hcc) Providers Seen During Your Hospitalizations Provider Role Specialty Primary office phone Qamar Reis MD Attending Provider General Surgery [phone] Your Primary Care Physician (PCP) Primary Care Physician Office Phone Office Fax Kinza OGDEN [phone] Follow-up Information Follow up With Details Comments Contact Info Qamar Reis MD Schedule an appointment as soon as possible for a visit on 10/19/2017 For wound re-check [address] [place] [address] 159 1988 [address] 
[phone] La Mak III, DO Schedule an appointment as soon as possible for a visit on 10/26/2017 Hospital follow up PCP appointment on Thursday, 10/26/17 @ 11:00 a.m. Chad Nichols [address] [facility] [address] [facility] 
[phone] Your Appointments Thursday October 26, 2017 11:00 AM EDT PHYSICAL PRE OP with DO DHEERAJ Kong III [facility] Dimitry Morales [facility] [address] Jose Balderas  
[phone] Current Discharge Medication List  
  
START taking these medications Dose & Instructions Dispensing Information Comments Morning Noon Evening Bedtime  
 amiodarone 200 mg tablet Commonly known as:  CORDARONE Your last dose was: Your next dose is:    
   
   
 Dose:  200 mg Take 1 Tab by mouth two (2) times a day. Indications: PREVENTION OF RECURRENT ATRIAL FIBRILLATION Quantity:  60 Tab Refills:  3  
     
   
   
   
  
 docusate sodium 100 mg capsule Commonly known as:  Erick Garcia Your last dose was: Your next dose is:    
   
   
 Dose:  100 mg Take 1 Cap by mouth two (2) times daily as needed for Constipation. Quantity:  60 Cap Refills:  0 HYDROcodone-acetaminophen 5-325 mg per tablet Commonly known as:  Sal3 Kendall Sullivan Your last dose was: Your next dose is:    
   
   
 Dose:  1-2 Tab Take 1-2 Tabs by mouth every four (4) hours as needed. Max Daily Amount: 12 Tabs. Quantity:  50 Tab Refills:  0  
     
   
   
   
  
 trimethoprim-sulfamethoxazole 160-800 mg per tablet Commonly known as:  BACTRIM DS, SEPTRA DS Your last dose was: Your next dose is:    
   
   
 Dose:  1 Tab Take 1 Tab by mouth every twelve (12) hours for 10 days. Quantity:  20 Tab Refills:  0 CONTINUE these medications which have NOT CHANGED Dose & Instructions Dispensing Information Comments Morning Noon Evening Bedtime ALTACE 10 mg capsule Generic drug:  ramipril Your last dose was: Your next dose is:    
   
   
 Dose:  10 mg Take 10 mg by mouth nightly. Refills:  0 aspirin 81 mg chewable tablet Your last dose was: Your next dose is:    
   
   
 Dose:  81 mg Take 81 mg by mouth nightly. Refills:  0  
     
   
   
   
  
 CALCIUM 600 + D 600-125 mg-unit Tab Generic drug:  calcium-cholecalciferol (d3) Your last dose was: Your next dose is: Take  by mouth daily. Refills:  0  
     
   
   
   
  
 gabapentin 300 mg capsule Commonly known as:  NEURONTIN Your last dose was: Your next dose is:    
   
   
 Dose:  900 mg Take 900 mg by mouth two (2) times a day. 3 x 300mg caps (900mg) twice daily Refills:  0  
     
   
   
   
  
 magnesium 200 mg Tab Your last dose was: Your next dose is: Take  by mouth daily. Refills:  0  
     
   
   
   
  
 VITAMIN B-12 1,000 mcg/mL injection Generic drug:  cyanocobalamin Your last dose was: Your next dose is:    
   
   
 Dose:  1000 mcg  
1,000 mcg by IntraMUSCular route once. TWICE PER MONTH, 1ST AND 15TH. Indications: VITAMIN B12 DEFICIENCY, Twice a month Refills:  0  
     
   
   
   
  
 VITAMIN D3 1,000 unit Cap Generic drug:  cholecalciferol Your last dose was: Your next dose is:    
   
   
 Dose:  5000 Units Take 5,000 Units by mouth daily. Refills:  0 ZyrTEC 10 mg tablet Generic drug:  cetirizine Your last dose was: Your next dose is: Take  by mouth nightly. Indications: AUTOIMMUNE DISORDER, SJOGRENS. Refills:  0 Where to Get Your Medications Information on where to get these meds will be given to you by the nurse or doctor. ! Ask your nurse or doctor about these medications  
  amiodarone 200 mg tablet  
 docusate sodium 100 mg capsule HYDROcodone-acetaminophen 5-325 mg per tablet  
 trimethoprim-sulfamethoxazole 160-800 mg per tablet Discharge Instructions Patient Discharge Instructions Johanna Mcmanus / [id number] : 1955 Admitted 10/6/2017 Discharged: 10/16/2017 · It is important that you take the medication exactly as they are prescribed. · Keep your medication in the bottles provided by the pharmacist and keep a list of the medication names, dosages, and times to be taken in your wallet. · Do not take other medications without consulting your doctor. What to do at [facility] Recommended diet: Regular diet as tolerated. Be sure to drink plenty of fluids throughout the day to avoid dehydration. Recommended activity: No Heavy Lifting (Less Than 10-15 Pounds) for 6 weeks after surgery. No Driving While Taking narcotic pain medications. May Take Shower when you go home. No submerging your incisions under water until cleared by your surgeon. If you experience any of the following symptoms Fevers, Chills, Nausea, Vomitting, worsening Redness or Drainage at Surgical Site(s) or Any Other Questions or Concerns Please Call -  [phone]. Follow-up with Dr. Lola Huynh on 10/19/17. Information obtained by : 
I understand that if any problems occur once I am at home I am to contact my physician. I understand and acknowledge receipt of the instructions indicated above. Physician's or R.N.'s Signature                                                                  Date/Time Patient or Representative Signature                                                          Date/Time Discharge Orders None Kris Announcement  We are excited to announce that we are making your provider's discharge notes available to you in [employer]. You will see these notes when they are completed and signed by the physician that discharged you from your recent hospital stay. If you have any questions or concerns about any information you see in [employer], please call the Health Information Department where you were seen or reach out to your Primary Care Provider for more information about your plan of care. Introducing [facility] SERVICES! Abbie Villareal introduces [employer] patient portal. Now you can access parts of your medical record, email your doctor's office, and request medication refills online. 1. In your internet browser, go to [email or web address] 2. Click on the First Time User? Click Here link in the Sign In box. You will see the New Member Sign Up page. 3. Enter your [employer] Access Code exactly as it appears below. You will not need to use this code after youve completed the sign-up process. If you do not sign up before the expiration date, you must request a new code. · [employer] Access Code: [email or web address] Expires: 10/24/2017 12:17 PM 
 
4. Enter the last four digits of your Social Security Number (xxxx) and Date of Birth (mm/dd/yyyy) as indicated and click Submit. You will be taken to the next sign-up page. 5. Create a ZeroMailt ID. This will be your [employer] login ID and cannot be changed, so think of one that is secure and easy to remember. 6. Create a [employer] password. You can change your password at any time. 7. Enter your Password Reset Question and Answer. This can be used at a later time if you forget your password. 8. Enter your e-mail address. You will receive e-mail notification when new information is available in [address] 9. Click Sign Up. You can now view and download portions of your medical record. 10. Click the Download Summary menu link to download a portable copy of your medical information. If you have questions, please visit the Frequently Asked Questions section of the MyChart website. Remember, MyChart is NOT to be used for urgent needs. For medical emergencies, dial 911. Now available from your iPhone and Android! General Information Please provide this summary of care documentation to your next provider. Patient Signature:  ____________________________________________________________ Date:  ____________________________________________________________  
  
Mona Patino Provider Signature:  ____________________________________________________________ Date:  ____________________________________________________________

## 2017-10-06 NOTE — PERIOP NOTES
Patient interviewed in holding area, identity and procedure verified. 3000 ml 0.9% NaCl as irrigation. Attempted to contact family to inform of start of surgery, left message with volunteer.

## 2017-10-07 LAB
ALBUMIN SERPL-MCNC: 3.7 G/DL (ref 3.5–5)
ALBUMIN/GLOB SERPL: 1.7 {RATIO} (ref 1.1–2.2)
ALP SERPL-CCNC: 61 U/L (ref 45–117)
ALT SERPL-CCNC: 238 U/L (ref 12–78)
ANION GAP SERPL CALC-SCNC: 10 MMOL/L (ref 5–15)
AST SERPL-CCNC: 193 U/L (ref 15–37)
BASOPHILS # BLD: 0 K/UL (ref 0–0.1)
BASOPHILS NFR BLD: 0 % (ref 0–1)
BILIRUB SERPL-MCNC: 2.2 MG/DL (ref 0.2–1)
BUN SERPL-MCNC: 17 MG/DL (ref 6–20)
BUN/CREAT SERPL: 16 (ref 12–20)
CALCIUM SERPL-MCNC: 8.1 MG/DL (ref 8.5–10.1)
CHLORIDE SERPL-SCNC: 103 MMOL/L (ref 97–108)
CO2 SERPL-SCNC: 26 MMOL/L (ref 21–32)
CREAT SERPL-MCNC: 1.07 MG/DL (ref 0.7–1.3)
DIFFERENTIAL METHOD BLD: ABNORMAL
EOSINOPHIL # BLD: 0 K/UL (ref 0–0.4)
EOSINOPHIL NFR BLD: 0 % (ref 0–7)
ERYTHROCYTE [DISTWIDTH] IN BLOOD BY AUTOMATED COUNT: 14.7 % (ref 11.5–14.5)
GLOBULIN SER CALC-MCNC: 2.2 G/DL (ref 2–4)
GLUCOSE BLD STRIP.AUTO-MCNC: 131 MG/DL (ref 65–100)
GLUCOSE BLD STRIP.AUTO-MCNC: 170 MG/DL (ref 65–100)
GLUCOSE BLD STRIP.AUTO-MCNC: 184 MG/DL (ref 65–100)
GLUCOSE BLD STRIP.AUTO-MCNC: 186 MG/DL (ref 65–100)
GLUCOSE SERPL-MCNC: 148 MG/DL (ref 65–100)
HCT VFR BLD AUTO: 35.2 % (ref 36.6–50.3)
HGB BLD-MCNC: 11.7 G/DL (ref 12.1–17)
LYMPHOCYTES # BLD: 0.2 K/UL (ref 0.8–3.5)
LYMPHOCYTES NFR BLD: 2 % (ref 12–49)
MAGNESIUM SERPL-MCNC: 1.6 MG/DL (ref 1.6–2.4)
MCH RBC QN AUTO: 30.6 PG (ref 26–34)
MCHC RBC AUTO-ENTMCNC: 33.2 G/DL (ref 30–36.5)
MCV RBC AUTO: 92.1 FL (ref 80–99)
MONOCYTES # BLD: 0.3 K/UL (ref 0–1)
MONOCYTES NFR BLD: 3 % (ref 5–13)
NEUTS BAND NFR BLD MANUAL: 2 % (ref 0–6)
NEUTS SEG # BLD: 9.6 K/UL (ref 1.8–8)
NEUTS SEG NFR BLD: 93 % (ref 32–75)
PHOSPHATE SERPL-MCNC: 3.4 MG/DL (ref 2.6–4.7)
PLATELET # BLD AUTO: 110 K/UL (ref 150–400)
POTASSIUM SERPL-SCNC: 4 MMOL/L (ref 3.5–5.1)
PROT SERPL-MCNC: 5.9 G/DL (ref 6.4–8.2)
RBC # BLD AUTO: 3.82 M/UL (ref 4.1–5.7)
RBC MORPH BLD: ABNORMAL
SERVICE CMNT-IMP: ABNORMAL
SODIUM SERPL-SCNC: 139 MMOL/L (ref 136–145)
WBC # BLD AUTO: 10.1 K/UL (ref 4.1–11.1)

## 2017-10-07 PROCEDURE — 97161 PT EVAL LOW COMPLEX 20 MIN: CPT

## 2017-10-07 PROCEDURE — 74011250636 HC RX REV CODE- 250/636: Performed by: SURGERY

## 2017-10-07 PROCEDURE — 84100 ASSAY OF PHOSPHORUS: CPT | Performed by: SURGERY

## 2017-10-07 PROCEDURE — 74011250636 HC RX REV CODE- 250/636

## 2017-10-07 PROCEDURE — 80053 COMPREHEN METABOLIC PANEL: CPT | Performed by: SURGERY

## 2017-10-07 PROCEDURE — 85025 COMPLETE CBC W/AUTO DIFF WBC: CPT | Performed by: SURGERY

## 2017-10-07 PROCEDURE — 74011000250 HC RX REV CODE- 250: Performed by: SURGERY

## 2017-10-07 PROCEDURE — 74011636637 HC RX REV CODE- 636/637: Performed by: SURGERY

## 2017-10-07 PROCEDURE — 36415 COLL VENOUS BLD VENIPUNCTURE: CPT | Performed by: SURGERY

## 2017-10-07 PROCEDURE — 97116 GAIT TRAINING THERAPY: CPT

## 2017-10-07 PROCEDURE — 77010033678 HC OXYGEN DAILY

## 2017-10-07 PROCEDURE — 74011000250 HC RX REV CODE- 250: Performed by: ANESTHESIOLOGY

## 2017-10-07 PROCEDURE — 65660000000 HC RM CCU STEPDOWN

## 2017-10-07 PROCEDURE — 83735 ASSAY OF MAGNESIUM: CPT | Performed by: SURGERY

## 2017-10-07 PROCEDURE — 82962 GLUCOSE BLOOD TEST: CPT

## 2017-10-07 RX ORDER — HYDROMORPHONE HYDROCHLORIDE 1 MG/ML
1 INJECTION, SOLUTION INTRAMUSCULAR; INTRAVENOUS; SUBCUTANEOUS
Status: DISCONTINUED | OUTPATIENT
Start: 2017-10-07 | End: 2017-10-16 | Stop reason: HOSPADM

## 2017-10-07 RX ORDER — HYDROMORPHONE HYDROCHLORIDE 1 MG/ML
INJECTION, SOLUTION INTRAMUSCULAR; INTRAVENOUS; SUBCUTANEOUS
Status: COMPLETED
Start: 2017-10-07 | End: 2017-10-07

## 2017-10-07 RX ADMIN — INSULIN LISPRO 2 UNITS: 100 INJECTION, SOLUTION INTRAVENOUS; SUBCUTANEOUS at 07:01

## 2017-10-07 RX ADMIN — FAMOTIDINE 20 MG: 10 INJECTION, SOLUTION INTRAVENOUS at 21:34

## 2017-10-07 RX ADMIN — SODIUM CHLORIDE, SODIUM LACTATE, POTASSIUM CHLORIDE, AND CALCIUM CHLORIDE 150 ML/HR: 600; 310; 30; 20 INJECTION, SOLUTION INTRAVENOUS at 12:04

## 2017-10-07 RX ADMIN — HYDROMORPHONE HYDROCHLORIDE 1 MG: 1 INJECTION, SOLUTION INTRAMUSCULAR; INTRAVENOUS; SUBCUTANEOUS at 05:58

## 2017-10-07 RX ADMIN — ONDANSETRON 4 MG: 2 INJECTION INTRAMUSCULAR; INTRAVENOUS at 13:06

## 2017-10-07 RX ADMIN — INSULIN LISPRO 2 UNITS: 100 INJECTION, SOLUTION INTRAVENOUS; SUBCUTANEOUS at 17:09

## 2017-10-07 RX ADMIN — ONDANSETRON 4 MG: 2 INJECTION INTRAMUSCULAR; INTRAVENOUS at 03:38

## 2017-10-07 RX ADMIN — SODIUM CHLORIDE, SODIUM LACTATE, POTASSIUM CHLORIDE, AND CALCIUM CHLORIDE 150 ML/HR: 600; 310; 30; 20 INJECTION, SOLUTION INTRAVENOUS at 18:56

## 2017-10-07 RX ADMIN — Medication 10 ML: at 13:09

## 2017-10-07 RX ADMIN — Medication 10 ML: at 08:07

## 2017-10-07 RX ADMIN — HYDROMORPHONE HYDROCHLORIDE 1 MG: 1 INJECTION, SOLUTION INTRAMUSCULAR; INTRAVENOUS; SUBCUTANEOUS at 12:11

## 2017-10-07 RX ADMIN — HYDROMORPHONE HYDROCHLORIDE 1 MG: 1 INJECTION, SOLUTION INTRAMUSCULAR; INTRAVENOUS; SUBCUTANEOUS at 18:57

## 2017-10-07 RX ADMIN — FAMOTIDINE 20 MG: 10 INJECTION, SOLUTION INTRAVENOUS at 08:06

## 2017-10-07 RX ADMIN — INSULIN LISPRO 2 UNITS: 100 INJECTION, SOLUTION INTRAVENOUS; SUBCUTANEOUS at 13:02

## 2017-10-07 RX ADMIN — Medication 10 ML: at 21:34

## 2017-10-07 RX ADMIN — DIPHENHYDRAMINE HYDROCHLORIDE 12.5 MG: 50 INJECTION, SOLUTION INTRAMUSCULAR; INTRAVENOUS at 03:53

## 2017-10-07 RX ADMIN — HEPARIN SODIUM 5000 UNITS: 5000 INJECTION, SOLUTION INTRAVENOUS; SUBCUTANEOUS at 08:06

## 2017-10-07 RX ADMIN — ONDANSETRON 4 MG: 2 INJECTION INTRAMUSCULAR; INTRAVENOUS at 17:09

## 2017-10-07 RX ADMIN — ONDANSETRON 4 MG: 2 INJECTION INTRAMUSCULAR; INTRAVENOUS at 08:06

## 2017-10-07 RX ADMIN — HEPARIN SODIUM 5000 UNITS: 5000 INJECTION, SOLUTION INTRAVENOUS; SUBCUTANEOUS at 21:31

## 2017-10-07 RX ADMIN — Medication 12 ML/HR: at 15:46

## 2017-10-07 NOTE — PROGRESS NOTES
Problem: Mobility Impaired (Adult and Pediatric)  Goal: *Acute Goals and Plan of Care (Insert Text)  Physical Therapy Goals  Initiated 10/7/2017  1. Patient will move from supine to sit and sit to supine in bed with modified independence within 7 day(s). 2. Patient will transfer from bed to chair and chair to bed with modified independence using the least restrictive device within 7 day(s). 3. Patient will perform sit to stand with modified independence within 7 day(s). 4. Patient will ambulate with modified independence for 200 feet with the least restrictive device within 7 day(s). 5. Patient will ascend/descend 6 stairs with use of handrail(s) with modified independence within 7 day(s). PHYSICAL THERAPY EVALUATION  Patient: Gem Nuno (20 y.o. male)  Date: 10/7/2017  Primary Diagnosis: CANCER   Cholangiocarcinoma determined by biopsy of biliary tract (HCC)  Procedure(s) (LRB):  WHIPPLE PROCEDURE  (N/A) 1 Day Post-Op   Precautions:  Fall      ASSESSMENT :  Based on the objective data described below, the patient presents with generalized weakness, increased pain from procedure and decreased functional independence compared to his baseline. He was eager to get up to the chair and attempt walking if possible. He was educated on log roll to minimize pain in incision sites. Pt reports transitions from supine > sit and sit <> stand were the most painful. He was able to come to chair with min A and decided to attempt short distance ambulation. He walked 20 ft total (10 ft forward/backward x 2), holding to IV pole for support. Encouraged to be in chair and ambulating 3x/day with nursing staff. PT to follow up Monday for progression. Anticipate return home with HHPT vs no additional services. Patient will benefit from skilled intervention to address the above impairments.   Patients rehabilitation potential is considered to be Good  Factors which may influence rehabilitation potential include:   [X] None noted  [ ]         Mental ability/status  [ ]         Medical condition  [ ]         Home/family situation and support systems  [ ]         Safety awareness  [ ]         Pain tolerance/management  [ ]         Other:        PLAN :  Recommendations and Planned Interventions:  [X]           Bed Mobility Training             [ ]    Neuromuscular Re-Education  [X]           Transfer Training                   [ ]    Orthotic/Prosthetic Training  [X]           Gait Training                         [ ]    Modalities  [X]           Therapeutic Exercises           [ ]    Edema Management/Control  [X]           Therapeutic Activities            [X]    Patient and Family Training/Education  [ ]           Other (comment):     Frequency/Duration: Patient will be followed by physical therapy  5 times a week to address goals. Discharge Recommendations: Home Health vs none  Further Equipment Recommendations for Discharge: none       SUBJECTIVE:   Patient stated I hope to go home next Friday.       OBJECTIVE DATA SUMMARY:   HISTORY:    Past Medical History:   Diagnosis Date    Arrhythmia       Previous a.fib, ablation, NSR now; NO LONGER FOLLOWED BY CARDIOLOGIST.     Autoimmune disease ([facility] [address]       SJOGREN'S    Cancer ([facility] [address]       COMMON BILE DUCT, ADENOCARCINOMA    Hypertension       Past Surgical History:   Procedure Laterality Date    HX GI         COLONOSCOPY, POLYPS (BENIGN)    HX HEART CATHETERIZATION   2005     Ablation     HX ORTHOPAEDIC   2000     Spinal fusion W/ HARDWARE    NEUROLOGICAL PROCEDURE UNLISTED   2005     Ting hole washout from cerebral hemorrhage     Prior Level of Function/Home Situation: independent  Personal factors and/or comorbidities impacting plan of care:      Home Situation  Home Environment: Private residence  # Steps to Enter: 6  Rails to Enter: Yes  Hand Rails : Bilateral  One/Two Story Residence: Two story  # of Interior Steps: 13  Interior Rails: Right  Living Alone: No  Support Systems: Spouse/Significant Other/Partner  Patient Expects to be Discharged to[de-identified] Private residence  Current DME Used/Available at Home: None     EXAMINATION/PRESENTATION/DECISION MAKING:   Critical Behavior:  Neurologic State: Alert  Orientation Level: Oriented X4  Cognition: Appropriate decision making, Appropriate for age attention/concentration, Follows commands  Safety/Judgement: Awareness of environment  Hearing:     Skin:  Appears intact  Range Of Motion:  AROM: Within functional limits                       Strength:    Strength: Generally decreased, functional                    Tone & Sensation:   Tone: Normal              Sensation: Intact               Coordination:  Coordination: Within functional limits  Functional Mobility:  Bed Mobility:  Rolling: Supervision  Supine to Sit: Moderate assistance        Transfers:  Sit to Stand: Minimum assistance;Assist x1  Stand to Sit: Minimum assistance;Assist x1                       Balance:   Sitting: Intact  Standing: Impaired  Standing - Static: Good  Standing - Dynamic : Fair  Ambulation/Gait Training:  Distance (ft): 20 Feet (ft) (10 ft forward/backward x 2)  Assistive Device:  (Holds on to IV Pole)  Ambulation - Level of Assistance: Contact guard assistance     Gait Description (WDL): Exceptions to WDL  Gait Abnormalities: Antalgic;Decreased step clearance        Base of Support: Narrowed     Speed/Radha: Slow  Step Length: Right shortened;Left shortened        Pain:  Pain Scale 1: Numeric (0 - 10)  Pain Intensity 1: Did not quantify, but c/o pain with all movement              Activity Tolerance:   No apparent distress   Please refer to the flowsheet for vital signs taken during this treatment.   After treatment:   [X]         Patient left in no apparent distress sitting up in chair  [ ]         Patient left in no apparent distress in bed  [X]         Call bell left within reach  [X]         Nursing notified  [X]         Caregiver present  [ ]         Bed alarm activated      COMMUNICATION/EDUCATION:   The patients plan of care was discussed with: Registered Nurse.  [X]         Fall prevention education was provided and the patient/caregiver indicated understanding. [X]         Patient/family have participated as able in goal setting and plan of care. [X]         Patient/family agree to work toward stated goals and plan of care. [ ]         Patient understands intent and goals of therapy, but is neutral about his/her participation. [ ]         Patient is unable to participate in goal setting and plan of care.      Thank you for this referral.  Eduardo Vargas, PT   Time Calculation: 15 mins

## 2017-10-07 NOTE — PROGRESS NOTES
[facility] General Surgery    POD #1    Subjective     Doing well, NPO, epidural in place, hogan, a little break through pain this am, no N/V    Objective     Patient Vitals for the past 24 hrs:   Temp Pulse Resp BP SpO2   10/07/17 0700 - (!) 114 22 116/75 98 %   10/07/17 0600 - (!) 108 20 114/70 97 %   10/07/17 0500 - (!) 112 24 131/83 100 %   10/07/17 0426 - - - - 99 %   10/07/17 0400 98.3 °F (36.8 °C) (!) 113 27 106/61 99 %   10/07/17 0300 - (!) 103 17 107/68 99 %   10/07/17 0200 - (!) 101 15 105/77 97 %   10/07/17 0100 - 96 14 105/67 99 %   10/07/17 0000 97.9 °F (36.6 °C) 98 14 102/67 98 %   10/06/17 2300 - 92 14 112/68 97 %   10/06/17 2200 - 93 13 118/68 99 %   10/06/17 2100 - 93 16 116/62 100 %   10/06/17 2000 97.8 °F (36.6 °C) 98 18 114/79 99 %   10/06/17 1900 - 87 13 115/75 97 %   10/06/17 1800 - 94 18 110/66 96 %   10/06/17 1700 97.1 °F (36.2 °C) 98 16 120/60 -   10/06/17 1635 - 94 13 - -   10/06/17 1633 98.2 °F (36.8 °C) - - - -   10/06/17 1630 - 93 13 121/69 98 %   10/06/17 1625 - 95 15 - 99 %   10/06/17 1620 - 91 13 - 100 %   10/06/17 1615 - 92 13 123/68 100 %   10/06/17 1610 - 96 15 - 98 %   10/06/17 1605 - 99 16 - 100 %   10/06/17 1603 - 93 14 110/65 -   10/06/17 1600 - 98 15 - 100 %   10/06/17 1555 - 95 13 - 99 %   10/06/17 1550 - 95 13 - 99 %   10/06/17 1545 - (!) 102 18 111/64 99 %   10/06/17 1540 - 97 12 - 96 %   10/06/17 1535 - (!) 102 19 - 96 %   10/06/17 1530 - 100 13 107/62 96 %   10/06/17 1525 - 98 12 107/61 93 %   10/06/17 1522 98.2 °F (36.8 °C) 99 22 111/66 95 %   10/06/17 1520 - (!) 103 14 118/69 96 %   10/06/17 1515 - - - 112/63 -         Date 10/06/17 0700 - 10/07/17 0659 10/07/17 0700 - 10/08/17 0659   Shift 4144-7151 6376-9090 24 Hour Total 2899-9350 4213-7567 24 Hour Total   I  N  T  A  K  E   I.V.  (mL/kg/hr) 6797.5  (6.4) 1950  (1.6) 8747.5  (3.7) 150  150      I. V. 500  500         Volume (lactated Ringers infusion) 3000  3000 Volume (lactated Ringers infusion) 2800  2800         Volume (0.9% sodium chloride infusion) 400  400         Volume (lactated Ringers infusion) 97.5 1950 2047. 5 150  150    Shift Total  (mL/kg) 6797.5  (77.2) 1950  (19.7) 8747.5  (88.5) 150  (1.5)  150  (1.5)   O  U  T  P  U  T   Urine  (mL/kg/hr) 1700  (1.6) 1240  (1) 2940  (1.2) 65  65      Urine Output 1050  1050         Urine Output (mL) (Urinary Catheter 10/06/17 2- way; [place]) 650 1240 1890 65  65    Drains 60 620 680 100  100      Output (ml) (Anthony Neighbor #1 10/06/17 Right; Lower Abdomen) 50 320 370 50  50      Output (ml) (Anthony Neighbor #2 10/06/17 Right; Lower Abdomen) 10 300 310 50  50    Blood 1000  1000         Estimated Blood Loss 1000  1000       Shift Total  (mL/kg) 2760  (31.4) 1860  (18.8) 4620  (46.8) 165  (1.7)  165  (1.7)   NET 4037.5 90 4127.5 -15  -15   Weight (kg) 88 98.8 98.8 98.8 98.8 98.8       PE  Pulm - CTAB  CV - RRR  Abd - soft, ND, BS present, incision c/d/i, drains both ss    Labs  Recent Results (from the past 12 hour(s))   GLUCOSE, POC    Collection Time: 10/07/17  1:24 AM   Result Value Ref Range    Glucose (POC) 131 (H) 65 - 100 mg/dL    Performed by TATYANA OLSEN    CBC WITH AUTOMATED DIFF    Collection Time: 10/07/17  3:57 AM   Result Value Ref Range    WBC 10.1 4.1 - 11.1 K/uL    RBC 3.82 (L) 4.10 - 5.70 M/uL    HGB 11.7 (L) 12.1 - 17.0 g/dL    HCT 35.2 (L) 36.6 - 50.3 %    MCV 92.1 80.0 - 99.0 FL    MCH 30.6 26.0 - 34.0 PG    MCHC 33.2 30.0 - 36.5 g/dL    RDW 14.7 (H) 11.5 - 14.5 %    PLATELET 605 (L) 357 - 400 K/uL    NEUTROPHILS 93 (H) 32 - 75 %    BAND NEUTROPHILS 2 0 - 6 %    LYMPHOCYTES 2 (L) 12 - 49 %    MONOCYTES 3 (L) 5 - 13 %    EOSINOPHILS 0 0 - 7 %    BASOPHILS 0 0 - 1 %    ABS. NEUTROPHILS 9.6 (H) 1.8 - 8.0 K/UL    ABS. LYMPHOCYTES 0.2 (L) 0.8 - 3.5 K/UL    ABS. MONOCYTES 0.3 0.0 - 1.0 K/UL    ABS. EOSINOPHILS 0.0 0.0 - 0.4 K/UL    ABS.  BASOPHILS 0.0 0.0 - 0.1 K/UL    DF MANUAL      RBC COMMENTS ANISOCYTOSIS  1+       METABOLIC PANEL, COMPREHENSIVE    Collection Time: 10/07/17  3:57 AM   Result Value Ref Range    Sodium 139 136 - 145 mmol/L    Potassium 4.0 3.5 - 5.1 mmol/L    Chloride 103 97 - 108 mmol/L    CO2 26 21 - 32 mmol/L    Anion gap 10 5 - 15 mmol/L    Glucose 148 (H) 65 - 100 mg/dL    BUN 17 6 - 20 MG/DL    Creatinine 1.07 0.70 - 1.30 MG/DL    BUN/Creatinine ratio 16 12 - 20      GFR est AA >60 >60 ml/min/1.73m2    GFR est non-AA >60 >60 ml/min/1.73m2    Calcium 8.1 (L) 8.5 - 10.1 MG/DL    Bilirubin, total 2.2 (H) 0.2 - 1.0 MG/DL    ALT (SGPT) 238 (H) 12 - 78 U/L    AST (SGOT) 193 (H) 15 - 37 U/L    Alk. phosphatase 61 45 - 117 U/L    Protein, total 5.9 (L) 6.4 - 8.2 g/dL    Albumin 3.7 3.5 - 5.0 g/dL    Globulin 2.2 2.0 - 4.0 g/dL    A-G Ratio 1.7 1.1 - 2.2     MAGNESIUM    Collection Time: 10/07/17  3:57 AM   Result Value Ref Range    Magnesium 1.6 1.6 - 2.4 mg/dL   PHOSPHORUS    Collection Time: 10/07/17  3:57 AM   Result Value Ref Range    Phosphorus 3.4 2.6 - 4.7 MG/DL   GLUCOSE, POC    Collection Time: 10/07/17  6:58 AM   Result Value Ref Range    Glucose (POC) 186 (H) 65 - 100 mg/dL    Performed by Jonathan Saini is a 65 y. o.yr old male [facility]    Plan     Doing well post op  Tx to stepdown  HR up a little and will keep an eye on it  Cont IVF  Start clears today  UOP and Cr normal, seems volume replete currently  Cont heparin    Juan Lau MD

## 2017-10-08 LAB
ABO + RH BLD: NORMAL
ALBUMIN SERPL-MCNC: 2.8 G/DL (ref 3.5–5)
ALBUMIN/GLOB SERPL: 1.2 {RATIO} (ref 1.1–2.2)
ALP SERPL-CCNC: 52 U/L (ref 45–117)
ALT SERPL-CCNC: 146 U/L (ref 12–78)
ANION GAP SERPL CALC-SCNC: 8 MMOL/L (ref 5–15)
AST SERPL-CCNC: 62 U/L (ref 15–37)
BACTERIA SPEC CULT: ABNORMAL
BACTERIA SPEC CULT: ABNORMAL
BACTERIA SPEC CULT: NORMAL
BASOPHILS # BLD: 0 K/UL (ref 0–0.1)
BASOPHILS NFR BLD: 0 % (ref 0–1)
BILIRUB SERPL-MCNC: 2.1 MG/DL (ref 0.2–1)
BLD PROD TYP BPU: NORMAL
BLD PROD TYP BPU: NORMAL
BLOOD GROUP ANTIBODIES SERPL: NORMAL
BPU ID: NORMAL
BPU ID: NORMAL
BUN SERPL-MCNC: 17 MG/DL (ref 6–20)
BUN/CREAT SERPL: 16 (ref 12–20)
CALCIUM SERPL-MCNC: 7.9 MG/DL (ref 8.5–10.1)
CHLORIDE SERPL-SCNC: 100 MMOL/L (ref 97–108)
CO2 SERPL-SCNC: 27 MMOL/L (ref 21–32)
CREAT SERPL-MCNC: 1.04 MG/DL (ref 0.7–1.3)
CROSSMATCH RESULT,%XM: NORMAL
CROSSMATCH RESULT,%XM: NORMAL
DIFFERENTIAL METHOD BLD: ABNORMAL
EOSINOPHIL # BLD: 0 K/UL (ref 0–0.4)
EOSINOPHIL NFR BLD: 0 % (ref 0–7)
ERYTHROCYTE [DISTWIDTH] IN BLOOD BY AUTOMATED COUNT: 15 % (ref 11.5–14.5)
GLOBULIN SER CALC-MCNC: 2.4 G/DL (ref 2–4)
GLUCOSE BLD STRIP.AUTO-MCNC: 144 MG/DL (ref 65–100)
GLUCOSE BLD STRIP.AUTO-MCNC: 146 MG/DL (ref 65–100)
GLUCOSE BLD STRIP.AUTO-MCNC: 149 MG/DL (ref 65–100)
GLUCOSE BLD STRIP.AUTO-MCNC: 163 MG/DL (ref 65–100)
GLUCOSE BLD STRIP.AUTO-MCNC: 188 MG/DL (ref 65–100)
GLUCOSE SERPL-MCNC: 154 MG/DL (ref 65–100)
GRAM STN SPEC: ABNORMAL
HCT VFR BLD AUTO: 32.1 % (ref 36.6–50.3)
HGB BLD-MCNC: 10.7 G/DL (ref 12.1–17)
LYMPHOCYTES # BLD: 0.4 K/UL (ref 0.8–3.5)
LYMPHOCYTES NFR BLD: 4 % (ref 12–49)
MAGNESIUM SERPL-MCNC: 1.6 MG/DL (ref 1.6–2.4)
MCH RBC QN AUTO: 30.7 PG (ref 26–34)
MCHC RBC AUTO-ENTMCNC: 33.3 G/DL (ref 30–36.5)
MCV RBC AUTO: 92.2 FL (ref 80–99)
MONOCYTES # BLD: 0.9 K/UL (ref 0–1)
MONOCYTES NFR BLD: 8 % (ref 5–13)
NEUTS BAND NFR BLD MANUAL: 15 % (ref 0–6)
NEUTS SEG # BLD: 9.8 K/UL (ref 1.8–8)
NEUTS SEG NFR BLD: 73 % (ref 32–75)
PHOSPHATE SERPL-MCNC: 2.4 MG/DL (ref 2.6–4.7)
PLATELET # BLD AUTO: 100 K/UL (ref 150–400)
POTASSIUM SERPL-SCNC: 3.7 MMOL/L (ref 3.5–5.1)
PROT SERPL-MCNC: 5.2 G/DL (ref 6.4–8.2)
RBC # BLD AUTO: 3.48 M/UL (ref 4.1–5.7)
RBC MORPH BLD: ABNORMAL
SERVICE CMNT-IMP: ABNORMAL
SERVICE CMNT-IMP: NORMAL
SODIUM SERPL-SCNC: 135 MMOL/L (ref 136–145)
SPECIMEN EXP DATE BLD: NORMAL
STATUS OF UNIT,%ST: NORMAL
STATUS OF UNIT,%ST: NORMAL
UNIT DIVISION, %UDIV: 0
UNIT DIVISION, %UDIV: 0
WBC # BLD AUTO: 11.1 K/UL (ref 4.1–11.1)
WBC MORPH BLD: ABNORMAL

## 2017-10-08 PROCEDURE — 74011250637 HC RX REV CODE- 250/637: Performed by: SURGERY

## 2017-10-08 PROCEDURE — 65660000000 HC RM CCU STEPDOWN

## 2017-10-08 PROCEDURE — 74011250636 HC RX REV CODE- 250/636: Performed by: SURGERY

## 2017-10-08 PROCEDURE — 74011000250 HC RX REV CODE- 250: Performed by: SURGERY

## 2017-10-08 PROCEDURE — 74011636637 HC RX REV CODE- 636/637: Performed by: SURGERY

## 2017-10-08 PROCEDURE — 74011000250 HC RX REV CODE- 250: Performed by: ANESTHESIOLOGY

## 2017-10-08 PROCEDURE — 83735 ASSAY OF MAGNESIUM: CPT | Performed by: SURGERY

## 2017-10-08 PROCEDURE — 77010033678 HC OXYGEN DAILY

## 2017-10-08 PROCEDURE — 74011000258 HC RX REV CODE- 258: Performed by: SURGERY

## 2017-10-08 PROCEDURE — 84100 ASSAY OF PHOSPHORUS: CPT | Performed by: SURGERY

## 2017-10-08 PROCEDURE — 85025 COMPLETE CBC W/AUTO DIFF WBC: CPT | Performed by: SURGERY

## 2017-10-08 PROCEDURE — 36415 COLL VENOUS BLD VENIPUNCTURE: CPT | Performed by: SURGERY

## 2017-10-08 PROCEDURE — 80053 COMPREHEN METABOLIC PANEL: CPT | Performed by: SURGERY

## 2017-10-08 PROCEDURE — 82962 GLUCOSE BLOOD TEST: CPT

## 2017-10-08 RX ORDER — ACETAMINOPHEN 325 MG/1
650 TABLET ORAL
Status: DISCONTINUED | OUTPATIENT
Start: 2017-10-08 | End: 2017-10-10

## 2017-10-08 RX ORDER — GABAPENTIN 300 MG/1
900 CAPSULE ORAL 2 TIMES DAILY
Status: DISCONTINUED | OUTPATIENT
Start: 2017-10-08 | End: 2017-10-16 | Stop reason: HOSPADM

## 2017-10-08 RX ORDER — LEVOFLOXACIN 5 MG/ML
500 INJECTION, SOLUTION INTRAVENOUS EVERY 24 HOURS
Status: DISCONTINUED | OUTPATIENT
Start: 2017-10-08 | End: 2017-10-08

## 2017-10-08 RX ADMIN — Medication 12 ML/HR: at 12:12

## 2017-10-08 RX ADMIN — ONDANSETRON 4 MG: 2 INJECTION INTRAMUSCULAR; INTRAVENOUS at 12:35

## 2017-10-08 RX ADMIN — MEROPENEM 500 MG: 500 INJECTION, POWDER, FOR SOLUTION INTRAVENOUS at 23:31

## 2017-10-08 RX ADMIN — Medication 10 ML: at 21:29

## 2017-10-08 RX ADMIN — FAMOTIDINE 20 MG: 10 INJECTION, SOLUTION INTRAVENOUS at 08:00

## 2017-10-08 RX ADMIN — FAMOTIDINE 20 MG: 10 INJECTION, SOLUTION INTRAVENOUS at 21:28

## 2017-10-08 RX ADMIN — MEROPENEM 500 MG: 500 INJECTION, POWDER, FOR SOLUTION INTRAVENOUS at 17:12

## 2017-10-08 RX ADMIN — SODIUM CHLORIDE, SODIUM LACTATE, POTASSIUM CHLORIDE, AND CALCIUM CHLORIDE 150 ML/HR: 600; 310; 30; 20 INJECTION, SOLUTION INTRAVENOUS at 01:00

## 2017-10-08 RX ADMIN — SODIUM CHLORIDE, SODIUM LACTATE, POTASSIUM CHLORIDE, AND CALCIUM CHLORIDE 150 ML/HR: 600; 310; 30; 20 INJECTION, SOLUTION INTRAVENOUS at 20:35

## 2017-10-08 RX ADMIN — INSULIN LISPRO 2 UNITS: 100 INJECTION, SOLUTION INTRAVENOUS; SUBCUTANEOUS at 12:35

## 2017-10-08 RX ADMIN — Medication 10 ML: at 07:47

## 2017-10-08 RX ADMIN — HYDROMORPHONE HYDROCHLORIDE 1 MG: 1 INJECTION, SOLUTION INTRAMUSCULAR; INTRAVENOUS; SUBCUTANEOUS at 01:00

## 2017-10-08 RX ADMIN — SODIUM CHLORIDE, SODIUM LACTATE, POTASSIUM CHLORIDE, AND CALCIUM CHLORIDE 150 ML/HR: 600; 310; 30; 20 INJECTION, SOLUTION INTRAVENOUS at 14:31

## 2017-10-08 RX ADMIN — INSULIN LISPRO 2 UNITS: 100 INJECTION, SOLUTION INTRAVENOUS; SUBCUTANEOUS at 01:00

## 2017-10-08 RX ADMIN — HYDROMORPHONE HYDROCHLORIDE 1 MG: 1 INJECTION, SOLUTION INTRAMUSCULAR; INTRAVENOUS; SUBCUTANEOUS at 13:15

## 2017-10-08 RX ADMIN — SODIUM CHLORIDE, SODIUM LACTATE, POTASSIUM CHLORIDE, AND CALCIUM CHLORIDE 150 ML/HR: 600; 310; 30; 20 INJECTION, SOLUTION INTRAVENOUS at 07:47

## 2017-10-08 RX ADMIN — ONDANSETRON 4 MG: 2 INJECTION INTRAMUSCULAR; INTRAVENOUS at 20:17

## 2017-10-08 RX ADMIN — INSULIN LISPRO 2 UNITS: 100 INJECTION, SOLUTION INTRAVENOUS; SUBCUTANEOUS at 17:53

## 2017-10-08 RX ADMIN — DIPHENHYDRAMINE HYDROCHLORIDE 12.5 MG: 50 INJECTION, SOLUTION INTRAMUSCULAR; INTRAVENOUS at 20:17

## 2017-10-08 RX ADMIN — GABAPENTIN 900 MG: 300 CAPSULE ORAL at 12:14

## 2017-10-08 RX ADMIN — ONDANSETRON 4 MG: 2 INJECTION INTRAMUSCULAR; INTRAVENOUS at 06:28

## 2017-10-08 RX ADMIN — MEROPENEM 500 MG: 500 INJECTION, POWDER, FOR SOLUTION INTRAVENOUS at 12:15

## 2017-10-08 RX ADMIN — INSULIN LISPRO 2 UNITS: 100 INJECTION, SOLUTION INTRAVENOUS; SUBCUTANEOUS at 23:31

## 2017-10-08 RX ADMIN — GABAPENTIN 900 MG: 300 CAPSULE ORAL at 17:12

## 2017-10-08 RX ADMIN — Medication 3 ML/HR: at 08:04

## 2017-10-08 RX ADMIN — INSULIN LISPRO 2 UNITS: 100 INJECTION, SOLUTION INTRAVENOUS; SUBCUTANEOUS at 06:26

## 2017-10-08 RX ADMIN — HEPARIN SODIUM 5000 UNITS: 5000 INJECTION, SOLUTION INTRAVENOUS; SUBCUTANEOUS at 21:28

## 2017-10-08 RX ADMIN — HEPARIN SODIUM 5000 UNITS: 5000 INJECTION, SOLUTION INTRAVENOUS; SUBCUTANEOUS at 07:51

## 2017-10-08 RX ADMIN — Medication 10 ML: at 14:16

## 2017-10-08 RX ADMIN — ACETAMINOPHEN 650 MG: 325 TABLET, FILM COATED ORAL at 15:27

## 2017-10-08 RX ADMIN — Medication 3 ML/HR: at 12:13

## 2017-10-08 NOTE — PROGRESS NOTES
0730- Bedside and Verbal shift change report given to Rey Cheung (oncoming nurse) by Yanna Butt RN (offgoing nurse). Report included the following information SBAR, Kardex, Intake/Output, MAR, Recent Results and Cardiac Rhythm ST.   1200- Pt with fever of 101 oral. Given ice pack and room cooled down. 1500- Called Dr. Syed Guardado, regarding fever back to 101. Will start tylenol PO prn  1930- Bedside and Verbal shift change report given to [address] [place] [address] (oncoming nurse) by Miguel Roth RN (offgoing nurse).  Report included the following information SBAR, Kardex, OR Summary, Procedure Summary, Intake/Output, MAR, Recent Results and Cardiac Rhythm ST.

## 2017-10-08 NOTE — PROGRESS NOTES
[facility]    POD #2    Subjective     Doing well, tolerating clears well, starting to pass flatus, still burping some, minimal nausea, no vomiting, epidural in place, hogan    Objective     Patient Vitals for the past 24 hrs:   Temp Pulse Resp BP SpO2   10/08/17 0800 99.4 °F (37.4 °C) (!) 118 29 (!) 111/92 97 %   10/08/17 0700 - (!) 113 17 120/71 94 %   10/08/17 0600 - (!) 112 23 126/77 97 %   10/08/17 0500 - (!) 112 17 111/66 93 %   10/08/17 0400 98.8 °F (37.1 °C) (!) 112 18 113/75 94 %   10/08/17 0300 - (!) 110 17 123/74 93 %   10/08/17 0216 - - - - 93 %   10/08/17 0200 - (!) 115 21 111/73 94 %   10/08/17 0100 - (!) 121 24 116/70 94 %   10/08/17 0000 99.1 °F (37.3 °C) (!) 119 28 126/76 94 %   10/07/17 2300 - (!) 123 21 124/71 95 %   10/07/17 2200 - (!) 120 20 116/65 95 %   10/07/17 2100 - (!) 123 28 121/74 97 %   10/07/17 2000 99.5 °F (37.5 °C) (!) 122 19 121/81 91 %   10/07/17 1800 - (!) 123 (!) 31 126/79 97 %   10/07/17 1700 - (!) 120 19 117/69 93 %   10/07/17 1600 99.4 °F (37.4 °C) (!) 121 20 120/74 96 %   10/07/17 1500 - (!) 125 25 121/64 100 %   10/07/17 1400 - (!) 122 29 116/70 94 %   10/07/17 1300 - (!) 114 20 122/68 99 %   10/07/17 1200 98.5 °F (36.9 °C) (!) 117 26 128/90 100 %   10/07/17 1100 - (!) 113 20 119/72 99 %         Date 10/07/17 0700 - 10/08/17 0659 10/08/17 0700 - 10/09/17 0659   Shift [id number] 1900-0659 24 Hour Total 2929-1267 1757-0723 24 Hour Total   I  N  T  A  K  E   P.O. 120  120         P. O. 120  120       I.V.  (mL/kg/hr) 1500  (1.3) 2100  (1.8) 3600  (1.5) 303  303      Volume (lactated Ringers infusion) 1500 2100 3600 300  300      Volume Infused (mL) (HYDROmorphone 10 mcg/mL - bupivacaine 0.0625% pf epidural)    3  3    Shift Total  (mL/kg) 1620  (16.4) 2100  (21.3) 3720  (37.7) 303  (3.1)  303  (3.1)   O  U  T  P  U  T   Urine  (mL/kg/hr) 715  (0.6) 545  (0.5) 1260  (0.5) 160  160      Urine Output (mL) (Urinary Catheter 10/06/17 2- way; [place])  160  160    Drains 270 330 600 80  80      Output (ml) (Matt Mouse #1 10/06/17 Right; Lower Abdomen) 140 110 250 30  30      Output (ml) (Winslow Mouse #2 10/06/17 Right; Lower Abdomen) 130 220 350 50  50    Shift Total  (mL/kg) 985  (10) 875  (8.9) 1860  (18.8) 240  (2.4)  240  (2.4)    1225 1860 63  63   Weight (kg) 98.8 98.7 98.7 98.7 98.7 98.7       PE  Pulm - CTAB  CV - RRR  Abd - soft, ND, BS present, incision c/d/i, JPs ss    Labs  Recent Results (from the past 12 hour(s))   GLUCOSE, POC    Collection Time: 10/08/17 12:54 AM   Result Value Ref Range    Glucose (POC) 188 (H) 65 - 100 mg/dL    Performed by TATYANA OLSEN    CBC WITH AUTOMATED DIFF    Collection Time: 10/08/17  6:11 AM   Result Value Ref Range    WBC 11.1 4.1 - 11.1 K/uL    RBC 3.48 (L) 4.10 - 5.70 M/uL    HGB 10.7 (L) 12.1 - 17.0 g/dL    HCT 32.1 (L) 36.6 - 50.3 %    MCV 92.2 80.0 - 99.0 FL    MCH 30.7 26.0 - 34.0 PG    MCHC 33.3 30.0 - 36.5 g/dL    RDW 15.0 (H) 11.5 - 14.5 %    PLATELET 864 (L) 130 - 400 K/uL    NEUTROPHILS 73 32 - 75 %    BAND NEUTROPHILS 15 (H) 0 - 6 %    LYMPHOCYTES 4 (L) 12 - 49 %    MONOCYTES 8 5 - 13 %    EOSINOPHILS 0 0 - 7 %    BASOPHILS 0 0 - 1 %    ABS. NEUTROPHILS 9.8 (H) 1.8 - 8.0 K/UL    ABS. LYMPHOCYTES 0.4 (L) 0.8 - 3.5 K/UL    ABS. MONOCYTES 0.9 0.0 - 1.0 K/UL    ABS. EOSINOPHILS 0.0 0.0 - 0.4 K/UL    ABS.  BASOPHILS 0.0 0.0 - 0.1 K/UL    DF MANUAL      RBC COMMENTS ANISOCYTOSIS  1+        WBC COMMENTS TOXIC GRANULATION     METABOLIC PANEL, COMPREHENSIVE    Collection Time: 10/08/17  6:11 AM   Result Value Ref Range    Sodium 135 (L) 136 - 145 mmol/L    Potassium 3.7 3.5 - 5.1 mmol/L    Chloride 100 97 - 108 mmol/L    CO2 27 21 - 32 mmol/L    Anion gap 8 5 - 15 mmol/L    Glucose 154 (H) 65 - 100 mg/dL    BUN 17 6 - 20 MG/DL    Creatinine 1.04 0.70 - 1.30 MG/DL    BUN/Creatinine ratio 16 12 - 20      GFR est AA >60 >60 ml/min/1.73m2    GFR est non-AA >60 >60 ml/min/1.73m2    Calcium 7.9 (L) 8.5 - 10.1 MG/DL    Bilirubin, total 2.1 (H) 0.2 - 1.0 MG/DL    ALT (SGPT) 146 (H) 12 - 78 U/L    AST (SGOT) 62 (H) 15 - 37 U/L    Alk. phosphatase 52 45 - 117 U/L    Protein, total 5.2 (L) 6.4 - 8.2 g/dL    Albumin 2.8 (L) 3.5 - 5.0 g/dL    Globulin 2.4 2.0 - 4.0 g/dL    A-G Ratio 1.2 1.1 - 2.2     MAGNESIUM    Collection Time: 10/08/17  6:11 AM   Result Value Ref Range    Magnesium 1.6 1.6 - 2.4 mg/dL   PHOSPHORUS    Collection Time: 10/08/17  6:11 AM   Result Value Ref Range    Phosphorus 2.4 (L) 2.6 - 4.7 MG/DL   GLUCOSE, POC    Collection Time: 10/08/17  6:18 AM   Result Value Ref Range    Glucose (POC) 149 (H) 65 - 100 mg/dL    Performed by [address] [place] [address] is a 65 y. o.yr old male s/p [place]    Plan     Doing well post op  Cont clears for now  Tachycardic a bit today but stable, labs all normal, no signs of leak  Cont epidural and hogan  Cont IVF  Tx to stepdown  Cont heparin    Desiree Oliveira MD

## 2017-10-09 ENCOUNTER — APPOINTMENT (OUTPATIENT)
Dept: GENERAL RADIOLOGY | Age: 62
DRG: 406 | End: 2017-10-09
Attending: INTERNAL MEDICINE
Payer: COMMERCIAL

## 2017-10-09 LAB
ALBUMIN SERPL-MCNC: 2.2 G/DL (ref 3.5–5)
ALBUMIN/GLOB SERPL: 0.8 {RATIO} (ref 1.1–2.2)
ALP SERPL-CCNC: 55 U/L (ref 45–117)
ALT SERPL-CCNC: 85 U/L (ref 12–78)
ANION GAP SERPL CALC-SCNC: 8 MMOL/L (ref 5–15)
AST SERPL-CCNC: 23 U/L (ref 15–37)
BASOPHILS # BLD: 0.1 K/UL (ref 0–0.1)
BASOPHILS NFR BLD: 1 % (ref 0–1)
BILIRUB SERPL-MCNC: 1.9 MG/DL (ref 0.2–1)
BUN SERPL-MCNC: 14 MG/DL (ref 6–20)
BUN/CREAT SERPL: 16 (ref 12–20)
CALCIUM SERPL-MCNC: 7.6 MG/DL (ref 8.5–10.1)
CHLORIDE SERPL-SCNC: 97 MMOL/L (ref 97–108)
CO2 SERPL-SCNC: 27 MMOL/L (ref 21–32)
CREAT SERPL-MCNC: 0.89 MG/DL (ref 0.7–1.3)
DIFFERENTIAL METHOD BLD: ABNORMAL
EOSINOPHIL # BLD: 0 K/UL (ref 0–0.4)
EOSINOPHIL NFR BLD: 0 % (ref 0–7)
ERYTHROCYTE [DISTWIDTH] IN BLOOD BY AUTOMATED COUNT: 14.4 % (ref 11.5–14.5)
GLOBULIN SER CALC-MCNC: 2.6 G/DL (ref 2–4)
GLUCOSE BLD STRIP.AUTO-MCNC: 118 MG/DL (ref 65–100)
GLUCOSE BLD STRIP.AUTO-MCNC: 119 MG/DL (ref 65–100)
GLUCOSE BLD STRIP.AUTO-MCNC: 137 MG/DL (ref 65–100)
GLUCOSE BLD STRIP.AUTO-MCNC: 151 MG/DL (ref 65–100)
GLUCOSE SERPL-MCNC: 116 MG/DL (ref 65–100)
HCT VFR BLD AUTO: 29.5 % (ref 36.6–50.3)
HGB BLD-MCNC: 10 G/DL (ref 12.1–17)
LIPASE FLD-CCNC: 1105 U/L
LIPASE FLD-CCNC: 1196 U/L
LYMPHOCYTES # BLD: 0.9 K/UL (ref 0.8–3.5)
LYMPHOCYTES NFR BLD: 9 % (ref 12–49)
MAGNESIUM SERPL-MCNC: 1.6 MG/DL (ref 1.6–2.4)
MCH RBC QN AUTO: 30.8 PG (ref 26–34)
MCHC RBC AUTO-ENTMCNC: 33.9 G/DL (ref 30–36.5)
MCV RBC AUTO: 90.8 FL (ref 80–99)
MONOCYTES # BLD: 0.6 K/UL (ref 0–1)
MONOCYTES NFR BLD: 6 % (ref 5–13)
NEUTS BAND NFR BLD MANUAL: 3 % (ref 0–6)
NEUTS SEG # BLD: 8.6 K/UL (ref 1.8–8)
NEUTS SEG NFR BLD: 81 % (ref 32–75)
PHOSPHATE SERPL-MCNC: 1.8 MG/DL (ref 2.6–4.7)
PLATELET # BLD AUTO: 103 K/UL (ref 150–400)
POTASSIUM SERPL-SCNC: 3.3 MMOL/L (ref 3.5–5.1)
PROT SERPL-MCNC: 4.8 G/DL (ref 6.4–8.2)
RBC # BLD AUTO: 3.25 M/UL (ref 4.1–5.7)
RBC MORPH BLD: ABNORMAL
RBC MORPH BLD: ABNORMAL
SERVICE CMNT-IMP: ABNORMAL
SODIUM SERPL-SCNC: 132 MMOL/L (ref 136–145)
SPECIMEN SOURCE FLD: NORMAL
SPECIMEN SOURCE FLD: NORMAL
WBC # BLD AUTO: 10.2 K/UL (ref 4.1–11.1)

## 2017-10-09 PROCEDURE — 74011000258 HC RX REV CODE- 258: Performed by: NURSE PRACTITIONER

## 2017-10-09 PROCEDURE — 97116 GAIT TRAINING THERAPY: CPT

## 2017-10-09 PROCEDURE — 74011250636 HC RX REV CODE- 250/636: Performed by: SURGERY

## 2017-10-09 PROCEDURE — 83735 ASSAY OF MAGNESIUM: CPT | Performed by: SURGERY

## 2017-10-09 PROCEDURE — 74011250636 HC RX REV CODE- 250/636: Performed by: INTERNAL MEDICINE

## 2017-10-09 PROCEDURE — 84100 ASSAY OF PHOSPHORUS: CPT | Performed by: SURGERY

## 2017-10-09 PROCEDURE — 82962 GLUCOSE BLOOD TEST: CPT

## 2017-10-09 PROCEDURE — 71010 XR CHEST PORT: CPT

## 2017-10-09 PROCEDURE — 85025 COMPLETE CBC W/AUTO DIFF WBC: CPT | Performed by: SURGERY

## 2017-10-09 PROCEDURE — 74011636637 HC RX REV CODE- 636/637: Performed by: SURGERY

## 2017-10-09 PROCEDURE — 93005 ELECTROCARDIOGRAM TRACING: CPT

## 2017-10-09 PROCEDURE — 80053 COMPREHEN METABOLIC PANEL: CPT | Performed by: SURGERY

## 2017-10-09 PROCEDURE — 74011250637 HC RX REV CODE- 250/637: Performed by: SURGERY

## 2017-10-09 PROCEDURE — 83690 ASSAY OF LIPASE: CPT | Performed by: SURGERY

## 2017-10-09 PROCEDURE — 65660000000 HC RM CCU STEPDOWN

## 2017-10-09 PROCEDURE — 74011000250 HC RX REV CODE- 250: Performed by: SURGERY

## 2017-10-09 PROCEDURE — 74011000250 HC RX REV CODE- 250: Performed by: NURSE PRACTITIONER

## 2017-10-09 PROCEDURE — 36415 COLL VENOUS BLD VENIPUNCTURE: CPT | Performed by: SURGERY

## 2017-10-09 PROCEDURE — 97530 THERAPEUTIC ACTIVITIES: CPT

## 2017-10-09 PROCEDURE — 74011000250 HC RX REV CODE- 250: Performed by: ANESTHESIOLOGY

## 2017-10-09 PROCEDURE — 77010033678 HC OXYGEN DAILY

## 2017-10-09 PROCEDURE — 74011000258 HC RX REV CODE- 258: Performed by: SURGERY

## 2017-10-09 PROCEDURE — 74011000250 HC RX REV CODE- 250: Performed by: INTERNAL MEDICINE

## 2017-10-09 PROCEDURE — 74011000258 HC RX REV CODE- 258: Performed by: INTERNAL MEDICINE

## 2017-10-09 PROCEDURE — 74011250636 HC RX REV CODE- 250/636: Performed by: NURSE PRACTITIONER

## 2017-10-09 RX ORDER — ENOXAPARIN SODIUM 100 MG/ML
40 INJECTION SUBCUTANEOUS EVERY 24 HOURS
Status: DISCONTINUED | OUTPATIENT
Start: 2017-10-10 | End: 2017-10-09

## 2017-10-09 RX ORDER — FUROSEMIDE 10 MG/ML
10 INJECTION INTRAMUSCULAR; INTRAVENOUS ONCE
Status: DISCONTINUED | OUTPATIENT
Start: 2017-10-09 | End: 2017-10-09

## 2017-10-09 RX ORDER — MAGNESIUM SULFATE HEPTAHYDRATE 40 MG/ML
2 INJECTION, SOLUTION INTRAVENOUS ONCE
Status: COMPLETED | OUTPATIENT
Start: 2017-10-09 | End: 2017-10-09

## 2017-10-09 RX ORDER — MAGNESIUM SULFATE HEPTAHYDRATE 40 MG/ML
2 INJECTION, SOLUTION INTRAVENOUS ONCE
Status: DISCONTINUED | OUTPATIENT
Start: 2017-10-09 | End: 2017-10-09

## 2017-10-09 RX ORDER — POTASSIUM CHLORIDE 14.9 MG/ML
10 INJECTION INTRAVENOUS
Status: COMPLETED | OUTPATIENT
Start: 2017-10-09 | End: 2017-10-10

## 2017-10-09 RX ORDER — METOPROLOL TARTRATE 5 MG/5ML
5 INJECTION INTRAVENOUS ONCE
Status: COMPLETED | OUTPATIENT
Start: 2017-10-09 | End: 2017-10-09

## 2017-10-09 RX ORDER — DIGOXIN 0.25 MG/ML
250 INJECTION INTRAMUSCULAR; INTRAVENOUS EVERY 6 HOURS
Status: COMPLETED | OUTPATIENT
Start: 2017-10-09 | End: 2017-10-10

## 2017-10-09 RX ORDER — METOPROLOL TARTRATE 5 MG/5ML
INJECTION INTRAVENOUS
Status: DISPENSED
Start: 2017-10-09 | End: 2017-10-09

## 2017-10-09 RX ORDER — HYDROMORPHONE HCL IN 0.9% NACL 15 MG/30ML
PATIENT CONTROLLED ANALGESIA VIAL INTRAVENOUS CONTINUOUS
Status: DISCONTINUED | OUTPATIENT
Start: 2017-10-09 | End: 2017-10-13

## 2017-10-09 RX ORDER — HEPARIN SODIUM 5000 [USP'U]/ML
5000 INJECTION, SOLUTION INTRAVENOUS; SUBCUTANEOUS EVERY 12 HOURS
Status: DISCONTINUED | OUTPATIENT
Start: 2017-10-09 | End: 2017-10-10

## 2017-10-09 RX ORDER — FUROSEMIDE 10 MG/ML
20 INJECTION INTRAMUSCULAR; INTRAVENOUS ONCE
Status: COMPLETED | OUTPATIENT
Start: 2017-10-09 | End: 2017-10-09

## 2017-10-09 RX ORDER — SODIUM CHLORIDE, SODIUM LACTATE, POTASSIUM CHLORIDE, CALCIUM CHLORIDE 600; 310; 30; 20 MG/100ML; MG/100ML; MG/100ML; MG/100ML
50 INJECTION, SOLUTION INTRAVENOUS CONTINUOUS
Status: DISCONTINUED | OUTPATIENT
Start: 2017-10-09 | End: 2017-10-10

## 2017-10-09 RX ADMIN — GABAPENTIN 900 MG: 300 CAPSULE ORAL at 08:40

## 2017-10-09 RX ADMIN — METOPROLOL TARTRATE 5 MG: 5 INJECTION INTRAVENOUS at 11:59

## 2017-10-09 RX ADMIN — Medication 12 ML/HR: at 04:35

## 2017-10-09 RX ADMIN — Medication 10 ML: at 21:06

## 2017-10-09 RX ADMIN — AMIODARONE HYDROCHLORIDE 0.5 MG/MIN: 50 INJECTION, SOLUTION INTRAVENOUS at 21:09

## 2017-10-09 RX ADMIN — DILTIAZEM HYDROCHLORIDE 11.5 MG/HR: 5 INJECTION, SOLUTION INTRAVENOUS at 21:09

## 2017-10-09 RX ADMIN — GABAPENTIN 900 MG: 300 CAPSULE ORAL at 18:53

## 2017-10-09 RX ADMIN — FAMOTIDINE 20 MG: 10 INJECTION, SOLUTION INTRAVENOUS at 21:05

## 2017-10-09 RX ADMIN — Medication 10 ML: at 06:45

## 2017-10-09 RX ADMIN — INSULIN LISPRO 2 UNITS: 100 INJECTION, SOLUTION INTRAVENOUS; SUBCUTANEOUS at 23:41

## 2017-10-09 RX ADMIN — HEPARIN SODIUM 5000 UNITS: 5000 INJECTION, SOLUTION INTRAVENOUS; SUBCUTANEOUS at 23:42

## 2017-10-09 RX ADMIN — MEROPENEM 500 MG: 500 INJECTION, POWDER, FOR SOLUTION INTRAVENOUS at 06:45

## 2017-10-09 RX ADMIN — MEROPENEM 500 MG: 500 INJECTION, POWDER, FOR SOLUTION INTRAVENOUS at 18:53

## 2017-10-09 RX ADMIN — POTASSIUM CHLORIDE 10 MEQ: 14.9 INJECTION, SOLUTION INTRAVENOUS at 14:03

## 2017-10-09 RX ADMIN — AMIODARONE HYDROCHLORIDE 1 MG/MIN: 50 INJECTION, SOLUTION INTRAVENOUS at 15:15

## 2017-10-09 RX ADMIN — ONDANSETRON 4 MG: 2 INJECTION INTRAMUSCULAR; INTRAVENOUS at 18:53

## 2017-10-09 RX ADMIN — HYDROMORPHONE HYDROCHLORIDE 1 MG: 1 INJECTION, SOLUTION INTRAMUSCULAR; INTRAVENOUS; SUBCUTANEOUS at 02:38

## 2017-10-09 RX ADMIN — AMIODARONE HYDROCHLORIDE 150 MG: 50 INJECTION, SOLUTION INTRAVENOUS at 14:53

## 2017-10-09 RX ADMIN — POTASSIUM CHLORIDE 10 MEQ: 14.9 INJECTION, SOLUTION INTRAVENOUS at 17:45

## 2017-10-09 RX ADMIN — POTASSIUM CHLORIDE 10 MEQ: 14.9 INJECTION, SOLUTION INTRAVENOUS at 15:59

## 2017-10-09 RX ADMIN — ACETAMINOPHEN 650 MG: 325 TABLET, FILM COATED ORAL at 16:06

## 2017-10-09 RX ADMIN — DIGOXIN 250 MCG: 0.25 INJECTION INTRAMUSCULAR; INTRAVENOUS at 17:08

## 2017-10-09 RX ADMIN — Medication: at 10:42

## 2017-10-09 RX ADMIN — MAGNESIUM SULFATE HEPTAHYDRATE 2 G: 40 INJECTION, SOLUTION INTRAVENOUS at 12:02

## 2017-10-09 RX ADMIN — SODIUM CHLORIDE, SODIUM LACTATE, POTASSIUM CHLORIDE, AND CALCIUM CHLORIDE 75 ML/HR: 600; 310; 30; 20 INJECTION, SOLUTION INTRAVENOUS at 08:41

## 2017-10-09 RX ADMIN — HEPARIN SODIUM 5000 UNITS: 5000 INJECTION, SOLUTION INTRAVENOUS; SUBCUTANEOUS at 13:17

## 2017-10-09 RX ADMIN — FUROSEMIDE 20 MG: 10 INJECTION, SOLUTION INTRAMUSCULAR; INTRAVENOUS at 13:58

## 2017-10-09 RX ADMIN — DILTIAZEM HYDROCHLORIDE 10 MG/HR: 5 INJECTION, SOLUTION INTRAVENOUS at 22:36

## 2017-10-09 RX ADMIN — DILTIAZEM HYDROCHLORIDE 5 MG/HR: 5 INJECTION, SOLUTION INTRAVENOUS at 13:40

## 2017-10-09 RX ADMIN — POTASSIUM PHOSPHATE, MONOBASIC AND POTASSIUM PHOSPHATE, DIBASIC: 224; 236 INJECTION, SOLUTION INTRAVENOUS at 12:08

## 2017-10-09 RX ADMIN — Medication 10 ML: at 13:21

## 2017-10-09 RX ADMIN — MEROPENEM 500 MG: 500 INJECTION, POWDER, FOR SOLUTION INTRAVENOUS at 13:13

## 2017-10-09 RX ADMIN — ACETAMINOPHEN 650 MG: 325 TABLET, FILM COATED ORAL at 02:38

## 2017-10-09 RX ADMIN — FAMOTIDINE 20 MG: 10 INJECTION, SOLUTION INTRAVENOUS at 08:40

## 2017-10-09 RX ADMIN — DIGOXIN 250 MCG: 0.25 INJECTION INTRAMUSCULAR; INTRAVENOUS at 23:42

## 2017-10-09 RX ADMIN — MEROPENEM 500 MG: 500 INJECTION, POWDER, FOR SOLUTION INTRAVENOUS at 23:40

## 2017-10-09 NOTE — PROGRESS NOTES
Problem: Mobility Impaired (Adult and Pediatric)  Goal: *Acute Goals and Plan of Care (Insert Text)  Physical Therapy Goals  Initiated 10/7/2017  1. Patient will move from supine to sit and sit to supine in bed with modified independence within 7 day(s). 2. Patient will transfer from bed to chair and chair to bed with modified independence using the least restrictive device within 7 day(s). 3. Patient will perform sit to stand with modified independence within 7 day(s). 4. Patient will ambulate with modified independence for 200 feet with the least restrictive device within 7 day(s). 5. Patient will ascend/descend 6 stairs with use of handrail(s) with modified independence within 7 day(s). PHYSICAL THERAPY TREATMENT  Patient: Chandra Dwyer (1 y.o. 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 Remains on 3L O2 (none at baseline). Pt reports minimal improvements in pain with mobility as compared to previous session. Worked on sit<>stands from chair with up to min A for initial lift off and obtaining immediate standing balance. Verbal and tactile cues for upright posture - prefers to maintain crouched posture 2* pain - provided education on importance of obtaining full upright stance to allow gentle soft tissue stretching. Gait training progressed to approx 60ft in room - min A for stability and use of IV pole. Pt may benefit from short term use of RW during acute phase - will plan to introduce next session (adjust high to promote upright stance). Assisted pt back to bed at end of session per RN request with up to min A via log roll to reduce abdominal pain/strain.  Will continue to progress during acute admission - anticipating that as pain is more controlled pt will continue to progress well from a mobility standpoint. Progression toward goals:  [ ]    Improving appropriately and progressing toward goals  [X]    Improving slowly and progressing toward goals  [ ]    Not making progress toward goals and plan of care will be adjusted       PLAN:  Patient continues to benefit from skilled intervention to address the above impairments. Continue treatment per established plan of care. Discharge Recommendations:  Home Health and None  Further Equipment Recommendations for Discharge:  TBD       SUBJECTIVE:   Patient stated I am not looking forward to this.       OBJECTIVE DATA SUMMARY:   Critical Behavior:  Neurologic State: Alert  Orientation Level: Oriented X4  Cognition: Appropriate decision making, Appropriate for age attention/concentration, Appropriate safety awareness, Follows commands  Safety/Judgement: Awareness of environment  Functional Mobility Training:  Bed Mobility:        Sit to Supine: Minimum assistance     Transfers:  Sit to Stand: Minimum assistance  Stand to Sit: Contact guard assistance     Balance:  Sitting: Intact  Standing: Impaired  Standing - Static: Good;Constant support  Standing - Dynamic : Fair  Ambulation/Gait Training:  Distance (ft):  (60)  Assistive Device: Gait belt (one UE on IV pole)  Ambulation - Level of Assistance: Contact guard assistance;Minimal assistance  Gait Abnormalities: Antalgic;Decreased step clearance;Shuffling gait  Base of Support: Narrowed  Speed/Radha: Slow;Shuffled  Step Length: Right shortened;Right lengthened     Pain:  Pain Scale 1: Numeric (0 - 10)  Pain Intensity 1: 3  Pain Intervention(s) 1: Medication (see MAR) (Bolus given)  Activity Tolerance:   VSS pre, during, after session. Please refer to the flowsheet for vital signs taken during this treatment.   After treatment:   [ ]    Patient left in no apparent distress sitting up in chair  [X]    Patient left in no apparent distress in bed  [X]    Call bell left within reach  [X]    Nursing notified  [ ]    Caregiver present  [ ]    Bed alarm activated      COMMUNICATION/COLLABORATION:   The patients plan of care was discussed with: Registered Nurse     Nicole Chapman PT, DPT   Time Calculation: 25 mins

## 2017-10-09 NOTE — CDMP QUERY
Please clarify if this patient is being treated/managed for:    =>Sepsis in the setting of cholangiocarcinoma, Temp: 101.1, 101/ HR: 121, 130/ Bands 15 requiring IV Antibiotics and fluids. =>Other Explanation of clinical findings  =>Unable to Determine (no explanation of clinical findings)    The medical record reflects the following clinical findings, treatment, and risk factors:    Risk Factors: Cholangiocarcinoma    Clinical Indicators: Temp: 101.1, 101/ HR: 121, 130/ Bands 15    Tissue culture:  Culture result:   SCANT KLEBSIELLA PNEUMONIAE ** (EXTENDED SPECTRUM BETA LACTAMASE ) **    Treatment: IV Antibiotics, fluids    Please clarify and document your clinical opinion in the progress notes and discharge summary including the definitive and/or presumptive diagnosis, (suspected or probable), related to the above clinical findings. Please include clinical findings supporting your diagnosis.     Thank you  Betsey Orozco  [facility]  [phone]

## 2017-10-09 NOTE — PROGRESS NOTES
General Surgery Daily Progress Note    Admit Date: 10/6/2017  Post-Operative Day: 3 Days Post-Op from Procedure(s):  WHIPPLE PROCEDURE      Subjective:     Last 24 hrs: Pt is sitting in the chair. Groggy. Receiving dilaudid prn w/ epidural fentanyl. Still remains tachycardic. Tmax 101.1 yesterday. Using IS. Tolerating clear liq and passing flatus. Objective:     Blood pressure 120/72, pulse (!) 116, temperature 99.1 °F (37.3 °C), resp. rate 17, height 5' 8\" (1.727 m), weight 217 lb 9.5 oz (98.7 kg), SpO2 95 %. Temp (24hrs), Av.6 °F (37.6 °C), Min:98.4 °F (36.9 °C), Max:101.1 °F (38.4 °C)      _____________________  Physical Exam:     Alert and Oriented, x3, in no acute distress. Cardiovascular: tachy 110, no peripheral edema  Lungs: diminished breath sounds in bases bilaterally  Abdomen: somewhat distended, distant BS, JPs w/ lg amt sl cloudy ss drainage.   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 ([facility] [address] (10/6/2017)            Plan:     Transfer to [facility] when bed available  D/c epidural and start dilaudid PCA  D/c hogan  Cont IVF  Adv diet to full liq  Replete lytes and ck lipase level in drain fluid per Dr Anastasiia Mcbride orders  lovenox to start tomorrow  Gi proph  Cont OOB/IS  Ambulate w/ PT  Am labs    Data Review:    Recent Labs      10/09/17   0332  10/08/17   0611  10/07/17   0357   WBC  10.2  11.1  10.1   HGB  10.0*  10.7*  11.7*   HCT  29.5*  32.1*  35.2*   PLT  103*  100*  110*     Recent Labs      10/09/17   0332  10/08/17   0611  10/07/17   0357  10/06/17   1605   NA  132*  135*  139  139   K  3.3*  3.7  4.0  4.3   CL  97  100  103  107   CO2  27  27  26  23   GLU  116*  154*  148*  218*   BUN  14  17  17  17   CREA  0.89  1.04  1.07  1.09   CA  7.6*  7.9*  8.1*  8.0*   MG  1.6  1.6  1.6   --    PHOS  1.8*  2.4*  3.4   --    ALB  2.2*  2.8*  3.7  3.4*   SGOT  23  62*  193*  208*   ALT  85*  146*  238*  175*   INR   --    --    -- 1.1     No results for input(s): AML, LPSE in the last 72 hours. ______________________  Medications:    Current Facility-Administered Medications   Medication Dose Route Frequency    potassium phosphate 30 mmol in 0.9% sodium chloride 250 mL infusion   IntraVENous ONCE    magnesium sulfate 2 g/50 ml IVPB (premix or compounded)  2 g IntraVENous ONCE    lactated Ringers infusion  75 mL/hr IntraVENous CONTINUOUS    [START ON 10/10/2017] enoxaparin (LOVENOX) injection 40 mg  40 mg SubCUTAneous Q24H    HYDROmorphone (PF) 15 mg/30 ml (DILAUDID) PCA   IntraVENous CONTINUOUS    gabapentin (NEURONTIN) capsule 900 mg  900 mg Oral BID    meropenem (MERREM) 500 mg in 0.9% sodium chloride (MBP/ADV) 50 mL  0.5 g IntraVENous Q6H    acetaminophen (TYLENOL) tablet 650 mg  650 mg Oral Q6H PRN    HYDROmorphone (PF) (DILAUDID) injection 1 mg  1 mg IntraVENous Q6H PRN    0.9% sodium chloride infusion 250 mL  250 mL IntraVENous PRN    ropivacaine (PF) (NAROPIN) 5 mg/mL (0.5 %) injection 150 mg  150 mg Peripheral Nerve Block PRN    sodium chloride (NS) flush 5-10 mL  5-10 mL IntraVENous Q8H    sodium chloride (NS) flush 5-10 mL  5-10 mL IntraVENous PRN    ondansetron (ZOFRAN) injection 4 mg  4 mg IntraVENous Q4H PRN    diphenhydrAMINE (BENADRYL) injection 12.5 mg  12.5 mg IntraVENous Q4H PRN    famotidine (PF) (PEPCID) 20 mg in sodium chloride 0.9 % 10 mL injection  20 mg IntraVENous Q12H    heparin (porcine) injection 5,000 Units  5,000 Units SubCUTAneous Q12H    hydrALAZINE (APRESOLINE) 20 mg/mL injection 10 mg  10 mg IntraVENous Q6H PRN    insulin lispro (HUMALOG) injection   SubCUTAneous Q6H    glucose chewable tablet 16 g  4 Tab Oral PRN    dextrose (D50W) injection syrg 12.5-25 g  12.5-25 g IntraVENous PRN    glucagon (GLUCAGEN) injection 1 mg  1 mg IntraMUSCular PRN       Alexander Ashby NP  10/9/2017        I have independently examined the patient and have reviewed the chart.   I agree with the above plan.  Patient febrile yesterday and has been tachycardic. WBC normal but concerning for possible small pancreatic leak. Started on IV abx yesterday due to fevers. Today he is up in chair, communicating appropriately, remains tachycardic to 110's. No n/v, passing flatus but is distended. Drains x 2 with mostly serous fluid although a little cloudy. No bile, pus or succuss noted. Will check drain lipase today. Continue drains to bulb suction. D/c epidural, change to PCA, d/c hogan. OOB/Ambulate. Decrease IVF. Continue on liquids until distention improves. Replacing lytes (K, Mag, Phos) for hypokalemia, hypophosphatemia, hypomagnesemia  Also has hyponatremia. Will check lytes again tomorrow. Continue on abx. Heparin held today for epidural removal.  Will change to lovenox tomorrow.       Azeem Felder MD  10/9/2017  9:42 AM

## 2017-10-09 NOTE — PROGRESS NOTES
NUTRITION   RD Screen      Pt seen for:      [x]  MST for unsure amt of wt loss   []   MD Consult    []        Supplements  []   PO intake check   []        Food Allergies  []   Food Preferences/tolerances    []        Rescreen  []   Education    []        Diet order clarification []   Other   []  LOS                          Nutrition Prescription:     No changes or new recommendations at this time  Encourage adequate intake of meals and supplements    Assessment:   Information obtained from:   RN, medical record, pt is resting    Pt admitted for planned whipple procedure following dx of adenocarcinoma. Wt hx reviewed and found wt has been around 200 lbs. Pt is asleep and son at bedside. Pt is advancing well after surgery. Pt will benefit from nutrition instruction prior to d/c regarding potential and expected diet changes once more transferred off ICU. Cultural, Yarsanism and ethnic food preferences:   [x]  None   []  Identified and addressed    Diet:  Full Liquids (begin at lunch today)    PO Intake: [x]   Good     []     Fair      []      Poor     No data found. 10/9 b'fast: clears 100% of tray    Wt Readings from Last 5 Encounters:   10/09/17 98.7 kg (217 lb 9.5 oz)   09/29/17 88 kg (194 lb)   09/21/17 90.7 kg (200 lb)   09/05/17 88.5 kg (195 lb)   08/31/17 100.2 kg (220 lb 12.8 oz)   ]  Wt Readings from Last 10 Encounters:   10/09/17 98.7 kg (217 lb 9.5 oz)   09/29/17 88 kg (194 lb)   09/21/17 90.7 kg (200 lb)   09/05/17 88.5 kg (195 lb)   08/31/17 100.2 kg (220 lb 12.8 oz)   08/18/17 90.5 kg (199 lb 9 oz)   07/28/17 89.1 kg (196 lb 6.9 oz)   07/25/17 89 kg (196 lb 3.4 oz)   ]    Body mass index is 33.09 kg/(m^2).     Skin: surgical incision  BM:  PTA  ABD: passing flatus, active    Estimated Daily Nutrition Requirements:  Kcals/day: 2470 Kcals/day  Protein: 128 g (1.3 g/kg of current wt)  Fluid: 2470 ml (25 mL/kg of current wt)     Based On: Kcal/kg - specify (Comment) (25 kcal/kg of current wt)  Weight Used: Actual wt (98.7 kg)    Weight Changes:   [x]   Loss (unsure, does not appear significant)  []   Gain  []   Stable    Nutrition Problems Identified  [x]     None  []     Specified food preferences   []     Dislikes supplements              []     Allergies  []     Difficulty chewing     []     Dentition   []     Nausea/Vomiting  []    Constipation  []    Diarrhea    Nutrition Diagnosis:      No nutrition diagnosis identified at this time    Intervention:   []    Obtained/adjusted food preferences/tolerances and/or snacks options   []    Dislikes supplements will try a substitution   []    Modify diet for food allergies  []    Adjust texture due to difficulty chewing   []    Encourage Fresh Fruit, Activia yogurt, fluid   []    Educated patient  [x]    Rescreen per screening protocol  []    Add Supplements    Goal: n/a    Monitoring/Evaluation:   Education & Discharge Needs  [] Nutrition related discharge needs addressed:   [] Supplements (on d/c instruction &/or coupons provided)    [x] Education-s/p whipple diet instruction will be needed prior to d/c  [] No nutrition related discharge needs at this time    Rescreen: []  At Nutrition Risk          [x]  Not at Nutrition Risk, rescreen per screening protocol    Re Snider, MS, RD, CDE

## 2017-10-09 NOTE — PROGRESS NOTES
Bedside and Verbal shift change report given to Brian Tavera RN (oncoming nurse) by Birda Goodell, RN (offgoing nurse). Report included the following information SBAR, Kardex, OR Summary, Procedure Summary, Intake/Output, MAR, Recent Results, Med Rec Status and Cardiac Rhythm ST with PVC's.     2000- Assumed patient care. Alert and oriented, moves all extremities. Resting in bed, no complaints of pain. Vital signs stable. 2 CHRIS drains on right, putting out small amounts of serosanguinous drainage. Incision to the midline--well approximated. Zofran and benadryl requested for nausea and itchiness.

## 2017-10-09 NOTE — CDMP QUERY
To accurately capture the SOI & ROM please clarify if this patient is being treated/managed for:    =>Hyponatremia in the setting of Sodium levels 135/132 requiring Normal saline replacement and lab monitoring. =>Other Explanation of clinical findings  =>Unable to Determine (no explanation of clinical findings)    The medical record reflects the following clinical findings, treatment, and risk factors:    Risk Factors: Cholangiocarcinoma    Clinical Indicators: Sodium levels 135/132    Treatment: Normal saline replacement and lab monitoring     Please clarify and document your clinical opinion in the progress notes and discharge summary including the definitive and/or presumptive diagnosis, (suspected or probable), related to the above clinical findings. Please include clinical findings supporting your diagnosis.     Thank you  Luigi Mahoney   [facility]  [phone]

## 2017-10-09 NOTE — PROGRESS NOTES
Pt w/ afib w/ RVR as high as 170s. BP stable; no c/o SOB  rec'd metoprolol 5mg IV w/o change  Started cardizem gtt at 5mg to titrate to 15mg  In meantime cardiology consult has been called  Will alert Dr Andrews Led  Pt does have hx of the above and required ablation 10yrs ago; hasn't had afib since that time.

## 2017-10-09 NOTE — PROGRESS NOTES
[facility]  Pulmonary, Critical Care, and Sleep Medicine  Name: Palomo Hudson MRN: [id number]   : 1955 Hospital[facility]   Date: 10/9/2017          IMPRESSION:   1. 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 PAF/RVR- pt with h/o Afib and per his report underwent surgical ablation 10 yrs ago. Presently back in PAF/RVR likely due to LA stretch from post op fluid shifts ( often max at day 2-3 post op)  3. Sjogren's  4. HTN      Clinical Overview and Plan:   · dilt drip  · Cards consult  · Pain control  · Replace K ( 3.3) and Mg ( 1.6)  · Gentle diuresis       Radiology  ( personally reviewed) No CXR   ABG No results for input(s): PHI, PO2I, PCO2I in the last 72 hours. Subjective/Interval History:   I have reviewed the flowsheet and previous days notes. Pertinent items are noted in HPI. Pt this afternoon with PAF/RVR . VR into 150s- irreg narrow complex tachy. Denies CP or SOB. No n/v/d. Objective:     Mode Rate Tidal Volume Pressure FiO2 PEEP                    Vital Signs:     TMAX(24)      Intake/Output:   Last shift:         Last 3 shifts: 10/09 0701 - 10/09 1900  In: 3 [I.V.:3]  Out: 305 [Urine:225; Drains:80]RRIOLAST3  Intake/Output Summary (Last 24 hours) at 10/09/17 1331  Last data filed at 10/09/17 1200   Gross per 24 hour   Intake             2406 ml   Output             2350 ml   Net               56 ml     EXAM:   HEENT:  PERRL, EOMI, no alar flaring or epistaxis, oral mucosa moist without cyanosis, NECK:  no jugular vein distention, no retractions, no thyromegaly or masses, LUNGS: decreased breath sounds billaterally and with rhonchi , HEART:  Regular rate and rhythm with no MGR; no edema is present, ABDOMEN:  soft with no tenderness, bowel sounds present, EXTREMITIES:  warm with no cyanosis and SKIN:  no jaundice or ecchymosis        Data    I have personally reviewed data, flowsheets for the last 24 hours. Labs:  Recent Labs      10/09/17   0332  10/08/17   0611  10/07/17   0357   WBC  10.2  11.1  10.1   HGB  10.0*  10.7*  11.7*   HCT  29.5*  32.1*  35.2*   PLT  103*  100*  110*     Recent Labs      10/09/17   0332  10/08/17   0611  10/07/17   0357  10/06/17   1605   NA  132*  135*  139  139   K  3.3*  3.7  4.0  4.3   CL  97  100  103  107   CO2  27  27  26  23   GLU  116*  154*  148*  218*   BUN  14  17 17  17   CREA  0.89  1.04  1.07  1.09   CA  7.6*  7.9*  8.1*  8.0*   MG  1.6  1.6  1.6   --    PHOS  1.8*  2.4*  3.4   --    ALB  2.2*  2.8*  3.7  3.4*   SGOT  23  62*  193*  208*   ALT  85*  146*  238*  175*   INR   --    --    --   1.1       Polo Rivas MD  [facility]

## 2017-10-09 NOTE — CONSULTS
Cardiology Consult Note    Pt seen and examined. Imp:  Pafib-recurrent with RVR  S/P Whipple procedure for cholangiocarcinoma  Hypokalemia  HTN  Sjogrens  S/P cerebral hemorrhage 2005  S/P afib ablation 2005    Rec: Will initiate amiodarone as most helpful at slowing rate without BP effect and also hopefully getting him converted. May also use digoxin to assist with rate control. PCXR      Disc:  Pt is now day #3 post op from [place] with acute onset of afib approximately 2 hours ago. A diltiazem drip has been started with little effect on his HR. He is asymptomatic at present. He has no other cardiac issues. He states that prior anticoagulants were not successful at controlling his afib prior to the ablation.      Ervin Guevara MD

## 2017-10-09 NOTE — CONSULTS
[address] [facility] 12 [address] [facility]   [address] [facility]       Name:  Casi Carpio   MR#:  [id number]   :  1955   Account #:  [de-identified]    Date of Consultation:  10/09/2017   Date of Adm:  10/06/2017       REQUESTING PHYSICIAN: Tania Monroe MD.    REASON FOR CONSULTATION: I am asked to see the patient for recent   onset atrial fibrillation with rapid ventricular response. He has a prior   history of atrial fibrillation for which he underwent ablation in . At   that time, he states he had no other cardiac issues noted and has not   been symptomatic of any further atrial fibrillation since his ablation. 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 He was actually unaware of the atrial fibrillation,   does not feel his heart rate elevated or any palpitations. He does feel   generally slightly dyspneic due to the fact his abdomen is a little   bloated and also somewhat uncomfortable. He denies any leg swelling   or tenderness. He has been wearing support stockings since the   procedure and has had no chest discomfort or dizziness associated. He does state that prior to his ablation, he was tried on antiarrhythmic   medications, which according to him were not successful, but he cannot   remember any of the names. USUAL MEDICINES PRIOR TO ADMISSION: Include:    1. Zyrtec 10 mg a day. 2. Altace 10 mg/day. 3. Neurontin 300 mg and actually takes 900 mg twice a day. 4. Baby aspirin. 5. Vitamin D.   6. Vitamin B12.   7. Magnesium. 8. Calcium with D.     ALLERGIES: INCLUDE PENICILLIN. PAST MEDICAL HISTORY: Notable for previous cerebral hemorrhage   and also history of Sjogren syndrome and hypertension. FAMILY HISTORY: Unremarkable. SOCIAL HISTORY: He is , nonsmoker, alcohol limited.     REVIEW OF SYSTEMS   Notable for the fact that he has not had any cardiovascular or   respiratory symptoms prior to the procedure. As noted above, he is   currently mildly uncomfortable in his abdominal region, but denies any   chest discomfort, pleuritic-type chest pain or pain in his lower   extremities. PHYSICAL EXAMINATION   GENERAL: His heart rate is rapid in the 160s and irregular. He is   afebrile though did have a fever of 99.6 at midnight, blood pressure   117/62, respiration rate about 20, O2 saturation is normal.   NECK: He has no JVD. LUNGS: Clear anteriorly. CARDIAC: Reveals a very rapid, irregular rate with no murmur or   gallop apparent. ABDOMEN: Distended and with minimal bowel sounds and a well-  healed scar. He still has some drainage tubes present. EXTREMITIES: Show no edema, no tenderness or redness and intact   distal pulses. NEUROLOGIC: He is alert and oriented x3 and grossly nonfocal.    LABORATORY FINDINGS OF NOTE: Include a sodium of 132 AND   potassium of 3.3. His creatinine is normal at 0.89. His liver enzymes   have decreased since surgery, now into the normal range and his   bilirubin is 1.9. In terms of his EKG, this morning shows atrial fibrillation   with a rapid ventricular response. There is some aberrancy noted and   nonspecific ST and T-wave abnormalities. He has not had a chest x-  ray since surgery and other laboratories were noted above including a   hemoglobin of 10 today. IMPRESSION: Acute onset atrial fibrillation with a rapid ventricular   response. Quite possibly related to surgery and significant fluid shifts   associated with some electrolyte abnormalities and obviously an   underlying propensity for recurrent atrial fibrillation, even despite   ablation. At present, it appears that the most concerning thing is to get   his rate under control. Also hopefully convert him chemically so that   anticoagulation is not an issue. We will start with the amiodarone bolus   and then a drip.  Hopefully, we can get him off the diltiazem and it can   also use digoxin which will not affect his blood pressure to help with   rate control, specifically since he has normal renal function. He   thankfully is not in any significant distress at present and it does not   appear he has any underlying acute cardiac issues other than the atrial   fibrillation going on.         Jaime Keyes MD CB / Jackson Thakur   D:  10/09/2017   14:43   T:  10/09/2017   15:12   Job #:  [id number]

## 2017-10-10 ENCOUNTER — APPOINTMENT (OUTPATIENT)
Dept: CT IMAGING | Age: 62
DRG: 406 | End: 2017-10-10
Attending: INTERNAL MEDICINE
Payer: COMMERCIAL

## 2017-10-10 LAB
ALBUMIN SERPL-MCNC: 1.9 G/DL (ref 3.5–5)
ALBUMIN/GLOB SERPL: 0.7 {RATIO} (ref 1.1–2.2)
ALP SERPL-CCNC: 67 U/L (ref 45–117)
ALT SERPL-CCNC: 52 U/L (ref 12–78)
ANION GAP SERPL CALC-SCNC: 8 MMOL/L (ref 5–15)
ANION GAP SERPL CALC-SCNC: 9 MMOL/L (ref 5–15)
AST SERPL-CCNC: 15 U/L (ref 15–37)
ATRIAL RATE: 158 BPM
ATRIAL RATE: 306 BPM
BASOPHILS # BLD: 0 K/UL (ref 0–0.1)
BASOPHILS NFR BLD: 0 % (ref 0–1)
BILIRUB SERPL-MCNC: 1.5 MG/DL (ref 0.2–1)
BUN SERPL-MCNC: 10 MG/DL (ref 6–20)
BUN SERPL-MCNC: 10 MG/DL (ref 6–20)
BUN/CREAT SERPL: 13 (ref 12–20)
BUN/CREAT SERPL: 13 (ref 12–20)
CALCIUM SERPL-MCNC: 7.5 MG/DL (ref 8.5–10.1)
CALCIUM SERPL-MCNC: 7.5 MG/DL (ref 8.5–10.1)
CALCULATED P AXIS, ECG09: -58 DEGREES
CALCULATED P AXIS, ECG09: -90 DEGREES
CALCULATED R AXIS, ECG10: -14 DEGREES
CALCULATED R AXIS, ECG10: -26 DEGREES
CALCULATED T AXIS, ECG11: -88 DEGREES
CALCULATED T AXIS, ECG11: 142 DEGREES
CHLORIDE SERPL-SCNC: 91 MMOL/L (ref 97–108)
CHLORIDE SERPL-SCNC: 93 MMOL/L (ref 97–108)
CO2 SERPL-SCNC: 26 MMOL/L (ref 21–32)
CO2 SERPL-SCNC: 26 MMOL/L (ref 21–32)
CREAT SERPL-MCNC: 0.75 MG/DL (ref 0.7–1.3)
CREAT SERPL-MCNC: 0.79 MG/DL (ref 0.7–1.3)
DIAGNOSIS, 93000: NORMAL
DIAGNOSIS, 93000: NORMAL
DIFFERENTIAL METHOD BLD: ABNORMAL
EOSINOPHIL # BLD: 0 K/UL (ref 0–0.4)
EOSINOPHIL NFR BLD: 0 % (ref 0–7)
ERYTHROCYTE [DISTWIDTH] IN BLOOD BY AUTOMATED COUNT: 13.8 % (ref 11.5–14.5)
GLOBULIN SER CALC-MCNC: 2.8 G/DL (ref 2–4)
GLUCOSE BLD STRIP.AUTO-MCNC: 124 MG/DL (ref 65–100)
GLUCOSE BLD STRIP.AUTO-MCNC: 140 MG/DL (ref 65–100)
GLUCOSE BLD STRIP.AUTO-MCNC: 143 MG/DL (ref 65–100)
GLUCOSE SERPL-MCNC: 215 MG/DL (ref 65–100)
GLUCOSE SERPL-MCNC: 244 MG/DL (ref 65–100)
HCT VFR BLD AUTO: 29 % (ref 36.6–50.3)
HGB BLD-MCNC: 10 G/DL (ref 12.1–17)
LYMPHOCYTES # BLD: 0.4 K/UL (ref 0.8–3.5)
LYMPHOCYTES NFR BLD: 3 % (ref 12–49)
MAGNESIUM SERPL-MCNC: 1.7 MG/DL (ref 1.6–2.4)
MCH RBC QN AUTO: 30.9 PG (ref 26–34)
MCHC RBC AUTO-ENTMCNC: 34.5 G/DL (ref 30–36.5)
MCV RBC AUTO: 89.5 FL (ref 80–99)
MONOCYTES # BLD: 1 K/UL (ref 0–1)
MONOCYTES NFR BLD: 8 % (ref 5–13)
NEUTS BAND NFR BLD MANUAL: 11 % (ref 0–6)
NEUTS SEG # BLD: 11.3 K/UL (ref 1.8–8)
NEUTS SEG NFR BLD: 78 % (ref 32–75)
P-R INTERVAL, ECG05: 80 MS
PHOSPHATE SERPL-MCNC: 1.8 MG/DL (ref 2.6–4.7)
PLATELET # BLD AUTO: 140 K/UL (ref 150–400)
POTASSIUM SERPL-SCNC: 3.2 MMOL/L (ref 3.5–5.1)
POTASSIUM SERPL-SCNC: 3.3 MMOL/L (ref 3.5–5.1)
PROT SERPL-MCNC: 4.7 G/DL (ref 6.4–8.2)
Q-T INTERVAL, ECG07: 250 MS
Q-T INTERVAL, ECG07: 282 MS
QRS DURATION, ECG06: 90 MS
QRS DURATION, ECG06: 98 MS
QTC CALCULATION (BEZET), ECG08: 399 MS
QTC CALCULATION (BEZET), ECG08: 457 MS
RBC # BLD AUTO: 3.24 M/UL (ref 4.1–5.7)
RBC MORPH BLD: ABNORMAL
SERVICE CMNT-IMP: ABNORMAL
SODIUM SERPL-SCNC: 126 MMOL/L (ref 136–145)
SODIUM SERPL-SCNC: 127 MMOL/L (ref 136–145)
VENTRICULAR RATE, ECG03: 153 BPM
VENTRICULAR RATE, ECG03: 158 BPM
WBC # BLD AUTO: 12.7 K/UL (ref 4.1–11.1)

## 2017-10-10 PROCEDURE — 74011000258 HC RX REV CODE- 258: Performed by: INTERNAL MEDICINE

## 2017-10-10 PROCEDURE — 77010033678 HC OXYGEN DAILY

## 2017-10-10 PROCEDURE — 82962 GLUCOSE BLOOD TEST: CPT

## 2017-10-10 PROCEDURE — 74011250637 HC RX REV CODE- 250/637: Performed by: INTERNAL MEDICINE

## 2017-10-10 PROCEDURE — 74011000250 HC RX REV CODE- 250: Performed by: SURGERY

## 2017-10-10 PROCEDURE — 74011636637 HC RX REV CODE- 636/637: Performed by: SURGERY

## 2017-10-10 PROCEDURE — 85025 COMPLETE CBC W/AUTO DIFF WBC: CPT | Performed by: INTERNAL MEDICINE

## 2017-10-10 PROCEDURE — 74011250636 HC RX REV CODE- 250/636: Performed by: INTERNAL MEDICINE

## 2017-10-10 PROCEDURE — 93005 ELECTROCARDIOGRAM TRACING: CPT

## 2017-10-10 PROCEDURE — 74011250637 HC RX REV CODE- 250/637: Performed by: SURGERY

## 2017-10-10 PROCEDURE — 74011250636 HC RX REV CODE- 250/636: Performed by: SURGERY

## 2017-10-10 PROCEDURE — 83735 ASSAY OF MAGNESIUM: CPT | Performed by: SURGERY

## 2017-10-10 PROCEDURE — 74011250636 HC RX REV CODE- 250/636: Performed by: NURSE PRACTITIONER

## 2017-10-10 PROCEDURE — 74011000250 HC RX REV CODE- 250: Performed by: INTERNAL MEDICINE

## 2017-10-10 PROCEDURE — 65660000000 HC RM CCU STEPDOWN

## 2017-10-10 PROCEDURE — 84100 ASSAY OF PHOSPHORUS: CPT | Performed by: SURGERY

## 2017-10-10 PROCEDURE — 74011000258 HC RX REV CODE- 258: Performed by: SURGERY

## 2017-10-10 PROCEDURE — 36415 COLL VENOUS BLD VENIPUNCTURE: CPT | Performed by: SURGERY

## 2017-10-10 PROCEDURE — 80053 COMPREHEN METABOLIC PANEL: CPT | Performed by: SURGERY

## 2017-10-10 PROCEDURE — 80048 BASIC METABOLIC PNL TOTAL CA: CPT | Performed by: INTERNAL MEDICINE

## 2017-10-10 PROCEDURE — 74176 CT ABD & PELVIS W/O CONTRAST: CPT

## 2017-10-10 RX ORDER — POTASSIUM CHLORIDE 14.9 MG/ML
10 INJECTION INTRAVENOUS
Status: COMPLETED | OUTPATIENT
Start: 2017-10-10 | End: 2017-10-10

## 2017-10-10 RX ORDER — ACETAMINOPHEN 500 MG
1000 TABLET ORAL EVERY 6 HOURS
Status: COMPLETED | OUTPATIENT
Start: 2017-10-10 | End: 2017-10-11

## 2017-10-10 RX ORDER — ENOXAPARIN SODIUM 100 MG/ML
40 INJECTION SUBCUTANEOUS EVERY 24 HOURS
Status: DISCONTINUED | OUTPATIENT
Start: 2017-10-10 | End: 2017-10-16 | Stop reason: HOSPADM

## 2017-10-10 RX ORDER — ACETAMINOPHEN 650 MG/1
975 SUPPOSITORY RECTAL EVERY 6 HOURS
Status: DISCONTINUED | OUTPATIENT
Start: 2017-10-10 | End: 2017-10-10

## 2017-10-10 RX ORDER — FUROSEMIDE 10 MG/ML
20 INJECTION INTRAMUSCULAR; INTRAVENOUS ONCE
Status: COMPLETED | OUTPATIENT
Start: 2017-10-10 | End: 2017-10-10

## 2017-10-10 RX ORDER — SODIUM CHLORIDE 9 MG/ML
25 INJECTION, SOLUTION INTRAVENOUS CONTINUOUS
Status: DISCONTINUED | OUTPATIENT
Start: 2017-10-10 | End: 2017-10-16 | Stop reason: HOSPADM

## 2017-10-10 RX ORDER — MAGNESIUM SULFATE HEPTAHYDRATE 40 MG/ML
2 INJECTION, SOLUTION INTRAVENOUS ONCE
Status: COMPLETED | OUTPATIENT
Start: 2017-10-10 | End: 2017-10-10

## 2017-10-10 RX ADMIN — FAMOTIDINE 20 MG: 10 INJECTION, SOLUTION INTRAVENOUS at 08:26

## 2017-10-10 RX ADMIN — HYDROMORPHONE HYDROCHLORIDE 1 MG: 1 INJECTION, SOLUTION INTRAMUSCULAR; INTRAVENOUS; SUBCUTANEOUS at 04:07

## 2017-10-10 RX ADMIN — DILTIAZEM HYDROCHLORIDE 7.5 MG/HR: 5 INJECTION, SOLUTION INTRAVENOUS at 06:02

## 2017-10-10 RX ADMIN — DILTIAZEM HYDROCHLORIDE 10 MG/HR: 5 INJECTION, SOLUTION INTRAVENOUS at 03:43

## 2017-10-10 RX ADMIN — AMIODARONE HYDROCHLORIDE 0.5 MG/MIN: 50 INJECTION, SOLUTION INTRAVENOUS at 08:15

## 2017-10-10 RX ADMIN — FUROSEMIDE 20 MG: 10 INJECTION, SOLUTION INTRAMUSCULAR; INTRAVENOUS at 09:52

## 2017-10-10 RX ADMIN — ONDANSETRON 4 MG: 2 INJECTION INTRAMUSCULAR; INTRAVENOUS at 18:06

## 2017-10-10 RX ADMIN — DILTIAZEM HYDROCHLORIDE 12.5 MG/HR: 5 INJECTION, SOLUTION INTRAVENOUS at 09:39

## 2017-10-10 RX ADMIN — DILTIAZEM HYDROCHLORIDE 12.5 MG/HR: 5 INJECTION, SOLUTION INTRAVENOUS at 20:11

## 2017-10-10 RX ADMIN — Medication 10 ML: at 16:23

## 2017-10-10 RX ADMIN — DILTIAZEM HYDROCHLORIDE 12.5 MG/HR: 5 INJECTION, SOLUTION INTRAVENOUS at 07:24

## 2017-10-10 RX ADMIN — DILTIAZEM HYDROCHLORIDE 10 MG/HR: 5 INJECTION, SOLUTION INTRAVENOUS at 06:28

## 2017-10-10 RX ADMIN — ACETAMINOPHEN 1000 MG: 500 TABLET, FILM COATED ORAL at 16:23

## 2017-10-10 RX ADMIN — SODIUM CHLORIDE: 900 INJECTION, SOLUTION INTRAVENOUS at 09:56

## 2017-10-10 RX ADMIN — POTASSIUM CHLORIDE 10 MEQ: 14.9 INJECTION, SOLUTION INTRAVENOUS at 11:18

## 2017-10-10 RX ADMIN — POTASSIUM CHLORIDE 10 MEQ: 14.9 INJECTION, SOLUTION INTRAVENOUS at 10:13

## 2017-10-10 RX ADMIN — SODIUM CHLORIDE 75 ML/HR: 900 INJECTION, SOLUTION INTRAVENOUS at 09:45

## 2017-10-10 RX ADMIN — ACETAMINOPHEN 1000 MG: 500 TABLET, FILM COATED ORAL at 10:49

## 2017-10-10 RX ADMIN — MEROPENEM 500 MG: 500 INJECTION, POWDER, FOR SOLUTION INTRAVENOUS at 17:41

## 2017-10-10 RX ADMIN — Medication 10 ML: at 05:26

## 2017-10-10 RX ADMIN — AMIODARONE HYDROCHLORIDE 0.5 MG/MIN: 50 INJECTION, SOLUTION INTRAVENOUS at 23:51

## 2017-10-10 RX ADMIN — GABAPENTIN 900 MG: 300 CAPSULE ORAL at 17:40

## 2017-10-10 RX ADMIN — GABAPENTIN 900 MG: 300 CAPSULE ORAL at 08:26

## 2017-10-10 RX ADMIN — ACETAMINOPHEN 1000 MG: 500 TABLET, FILM COATED ORAL at 22:20

## 2017-10-10 RX ADMIN — Medication 10 ML: at 22:20

## 2017-10-10 RX ADMIN — Medication: at 14:01

## 2017-10-10 RX ADMIN — MEROPENEM 500 MG: 500 INJECTION, POWDER, FOR SOLUTION INTRAVENOUS at 12:19

## 2017-10-10 RX ADMIN — DIPHENHYDRAMINE HYDROCHLORIDE 12.5 MG: 50 INJECTION, SOLUTION INTRAMUSCULAR; INTRAVENOUS at 18:06

## 2017-10-10 RX ADMIN — DILTIAZEM HYDROCHLORIDE 11.5 MG/HR: 5 INJECTION, SOLUTION INTRAVENOUS at 00:40

## 2017-10-10 RX ADMIN — AMIODARONE HYDROCHLORIDE 150 MG: 50 INJECTION, SOLUTION INTRAVENOUS at 10:00

## 2017-10-10 RX ADMIN — DIGOXIN 250 MCG: 0.25 INJECTION INTRAMUSCULAR; INTRAVENOUS at 11:19

## 2017-10-10 RX ADMIN — INSULIN LISPRO 2 UNITS: 100 INJECTION, SOLUTION INTRAVENOUS; SUBCUTANEOUS at 17:39

## 2017-10-10 RX ADMIN — INSULIN LISPRO 2 UNITS: 100 INJECTION, SOLUTION INTRAVENOUS; SUBCUTANEOUS at 05:25

## 2017-10-10 RX ADMIN — AMIODARONE HYDROCHLORIDE 1 MG/MIN: 50 INJECTION, SOLUTION INTRAVENOUS at 16:28

## 2017-10-10 RX ADMIN — FAMOTIDINE 20 MG: 10 INJECTION, SOLUTION INTRAVENOUS at 20:49

## 2017-10-10 RX ADMIN — MAGNESIUM SULFATE HEPTAHYDRATE 2 G: 40 INJECTION, SOLUTION INTRAVENOUS at 10:12

## 2017-10-10 RX ADMIN — MEROPENEM 500 MG: 500 INJECTION, POWDER, FOR SOLUTION INTRAVENOUS at 05:25

## 2017-10-10 RX ADMIN — ENOXAPARIN SODIUM 40 MG: 40 INJECTION SUBCUTANEOUS at 09:50

## 2017-10-10 RX ADMIN — DIGOXIN 250 MCG: 0.25 INJECTION INTRAMUSCULAR; INTRAVENOUS at 05:25

## 2017-10-10 NOTE — PROGRESS NOTES
[facility]  Pulmonary, Critical Care, and Sleep Medicine  Name: Heidi Stein MRN: [id number]   : 1955 Hospital: [facility]    Date: 10/10/2017          IMPRESSION:   1. 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 PAF/RVR- pt with h/o Afib and per his report underwent surgical ablation 10 yrs ago. Presently back in PAF/RVR likely due to LA stretch from post op fluid shifts ( often max at day 2-3 post op)  3. AMS- suspect metabolic toxic  4. Hyponatremia- suspect hypervolemic hyponatremia  5. Fever/leukocytosis- ? Evolving abd collection. On meropenem. ESBL Klebsiella on liver tissue cx  6. Sjogren's  7. HTN      Clinical Overview and Plan:   · dilt drip/amio drip  · Continue diuresis  · Pain control  · Replace lytes  · Pancx  · CT abd pelvis today       Radiology  ( personally reviewed) 10/9 CXR with moderate right effusion and RLL ATX vs ASD   ABG No results for input(s): PHI, PO2I, PCO2I in the last 72 hours. Subjective/Interval History:   I have reviewed the flowsheet and previous days notes. Pertinent items are noted in HPI. More confused today. C/o abd pain RLQ.  No r/g/r on exam      Objective:     Mode Rate Tidal Volume Pressure FiO2 PEEP                    Vital Signs:     TMAX(24)      Intake/Output:   Last shift:         Last 3 shifts: 10/10 0701 - 10/10 1900  In: 106.5 [I.V.:106.5]  Out: 115 [Urine:90; Drains:25]RRIOLAST3    Intake/Output Summary (Last 24 hours) at 10/10/17 0905  Last data filed at 10/10/17 0800   Gross per 24 hour   Intake          2685.12 ml   Output             3665 ml   Net          -979.88 ml     EXAM:   HEENT:  PERRL, EOMI, no alar flaring or epistaxis, oral mucosa moist without cyanosis, NECK:  no jugular vein distention, no retractions, no thyromegaly or masses, LUNGS: decreased breath sounds billaterally and with rhonchi , HEART:  Regular rate and rhythm with no MGR; no edema is present, ABDOMEN:  soft with no tenderness, bowel sounds present, EXTREMITIES:  warm with no cyanosis and SKIN:  no jaundice or ecchymosis        Data    I have personally reviewed data, flowsheets for the last 24 hours.         Labs:  Recent Labs      10/10/17   0329  10/09/17   0332  10/08/17   0611   WBC  12.7*  10.2  11.1   HGB  10.0*  10.0*  10.7*   HCT  29.0*  29.5*  32.1*   PLT  140*  103*  100*     Recent Labs      10/10/17   0329  10/09/17   0332  10/08/17   0611   NA  126*  127*  132*  135*   K  3.2*  3.3*  3.3*  3.7   CL  91*  93*  97  100   CO2  26  26  27  27   GLU  244*  215*  116*  154*   BUN  10  10  14  17   CREA  0.79  0.75  0.89  1.04   CA  7.5*  7.5*  7.6*  7.9*   MG  1.7  1.6  1.6   PHOS  1.8*  1.8*  2.4*   ALB  1.9*  2.2*  2.8*   SGOT  15  23  62*   ALT  52  85*  Nancy Blancas MD  [facility]

## 2017-10-10 NOTE — PROGRESS NOTES
730: Bedside and Verbal shift change report given to Alejandro Neal, RN (oncoming nurse) by Indio Cerna, RN (offgoing nurse). Report included the following information SBAR, Kardex, Intake/Output, MAR, Accordion, Recent Results and Cardiac Rhythm ST/a fib.     0800: Assessment completed. Patient rating pain 8/10, PCA encouraged. Reports feeling very confused but oriented x 4 to questions. 1000: Amio bolus given per cardiology. [id number]: Cardiology paged as patient's HR remains 120-150. Spoke with Dr. Shanice Do; instructed to increased amio rate back to 1 for additional 6 hours. 1430: Patient transported to CT.    1500: Returned from CT, uneventful transport. 1800: Dr. Andrews Led by to see patient and discuss plan after CT results. 1930: Shift Summary: Drowsy but oriented x 4 with intermittent brief confused statements. Remained in a-fib/a-flutter, given amio bolus + additional 6hrs of 1mg amio gtt. HR not under 100 but still a-fib. BP WNL today. Nasal cannula 2 L. Pain better controlled with PCA continuous dose and tylenol scheduled. No BM + flattus. PO intake better, minimal nausea. Vega out at 1900.

## 2017-10-10 NOTE — PROGRESS NOTES
Spiritual Care Assessment/Progress Notes    Talya Mcdaniel [id number]  [id number]    1955  65 y.o.  male    Patient Telephone Number: [phone] (home)   Latter day Affiliation: Shanelle Lopez   Language: English   Extended Emergency Contact Information  Primary Emergency Contact: [address] Phone: [phone]  Relation: Spouse  Secondary Emergency Contact: [address] Phone: [phone]  Relation: Child   Patient Active Problem List    Diagnosis Date Noted    Cholangiocarcinoma determined by biopsy of biliary tract ([facility] [address] 10/06/2017    Cholangiocarcinoma of biliary tract ([facility] [address] 09/11/2017    Common bile duct (CBD) obstruction 07/25/2017    UTI (urinary tract infection) 07/25/2017    Obstructive jaundice 07/23/2017        Date: 10/10/2017       Level of Latter day/Spiritual Activity:  []         Involved in lima tradition/spiritual practice    []         Not involved in lima tradition/spiritual practice  [x]         Spiritually oriented    []         Claims no spiritual orientation    []         seeking spiritual identity  []         Feels alienated from Lutheran practice/tradition  []         Feels angry about Lutheran practice/tradition  [x]         Spirituality/Lutheran tradition is a resource for coping at this time.   []         Not able to assess due to medical condition    Services Provided Today:  []         crisis intervention    []         reading Scriptures  [x]         spiritual assessment    []         prayer  [x]         empathic listening/emotional support  []         rites and rituals (cite in comments)  []         life review     []         Lutheran support  []         theological development   []         advocacy  []         ethical dialog     []         blessing  []         bereavement support    []         support to family  []         anticipatory grief support   []         help with AMD  []         spiritual guidance    []         meditation      Spiritual Care Needs  []         Emotional Support  []         Spiritual/Jainism Care  []         Loss/Adjustment  []         Advocacy/Referral                /Ethics  [x]         No needs expressed at               this time  []         Other: (note in               comments)  [address]  []         Follow up visits with               pt/family  []         Provide materials  []         Schedule sacraments  []         Contact Community               Clergy  [x]         Follow up as needed  []         Other: (note in               comments)     Comments:  is rounding for spiritual support with pt in room 7107. Pt was sleeping at the time. Pt's son Kalee Pierson by bedside. Kalee Pierson briefly processed pt's medical concerns and notes his dad would appreciate follow up later when he is awake. Spiritual care will continue to follow as requested.      [address] Michel Marx M.Div, M.S, Johnny 604 available at [address] [place] [phone])

## 2017-10-10 NOTE — PROGRESS NOTES
0730- Bedside and Verbal shift change report given to Rey Cheung (oncoming nurse) by Guillermo Shi RN (offgoing nurse). Report included the following information SBAR, Kardex, Intake/Output, MAR, Recent Results and Cardiac Rhythm ST.   1040- Epidural removed and Dilaudid PCA started. Pt education given on PCA. 1200- Pt's HR noted to be sustaine in 160's EKG obtained, [facility] NP notified. 5mg metoprolol given. Cardiology consulted  1330- Called general surgery, HR not improved after metoprolol. BP did drop to low 100's. Cardizem gtt started  1400- Lasix given. Will keep hogan in at this time. 1500- Cardiology at bedside. Amiodarone bolus given and drip started. Will attempt to wean cardizem  1600- Pt diaphoretic and febrile at 101.4 oral. Tylenol given per prn order  1700- Digoxin given. Pt converted to SR for minutes and then back to AFib. Rate down to 130's  1930- Bedside and Verbal shift change report given to [facility] (oncoming nurse) by Unruly Joseph RN (offgoing nurse). Report included the following information SBAR, Kardex, Intake/Output, MAR, Recent Results and Cardiac Rhythm AFIb.

## 2017-10-10 NOTE — PROGRESS NOTES
Care Management: reviewed chart independently and demographics with son Kalee Pierson. Patient saw PCP a few weeks ago: [facility] / [phone]. Obtains medications from [facility] and family support is wife, son and friends. Son will transport patient home, and no DME equipment used at home. Emergency contact is: Spouse Hyun : [phone]. To follow transition of care. Care Management Interventions  PCP Verified by CM:  Yes  Last Visit to PCP: 09/20/17  Palliative Care Criteria Met (RRAT>21 & CHF Dx)?: No  Mode of Transport at Discharge:  (private auto / son or wife)  Transition of Care Consult (CM Consult): Home Health (home to his house or sons/ depending on needs.)  MyChart Signup: No  Discharge Durable Medical Equipment: No  Physical Therapy Consult: Yes  Current Support Network: Family Lives Nearby  Confirm Follow Up Transport: Family  Plan discussed with Pt/Family/Caregiver: Yes  Discharge Location  Discharge Placement:  (TBD / home with son or wife.)    Mitch Morris RN BSN CM

## 2017-10-10 NOTE — PROGRESS NOTES
Cardiology Progress Note    Pt reports he is in a lot of pain in RLQ    Nursing notes that he had brief NSR last night but rate back up in ? Flutter vs sinus tach    Imp:  S/P Whipple with fever and abd pain  Pafib  Hyponatremic/hypokalemic  HTN  Sjogren's  Cholangiocarcinoma    Rec: Will review EKG to try to determine rhythm. In short term, would continue the cardizem and amiodarone; pain is definitely driving the heart rate. Correct electrolytes. Blood pressure 117/72, pulse (!) 150, temperature 98.7 °F (37.1 °C), resp. rate 18, height 5' 8\" (1.727 m), weight 99.2 kg (218 lb 11.1 oz), SpO2 95 %. Lungs with decreased BS at bases  Cor reg and tachy  Abd with positive bowel sounds  No edema    Labs reviewed  CXR-no infiltrates    Shoshana Padilla MD     Rhythm appears to be aflutter.  Will give an additional bolus of amiodarone

## 2017-10-10 NOTE — PROGRESS NOTES
[facility] General Surgery    POD #4 s/p pylorus preserving whipple for distal cholangiocarcinoma. Subjective     Patient remained tachycardic overnight with rates ranging from 110's to 150's. Febrile yesterday to 101.4. Patient complaining of more pain in RLQ near drains today. No n/v. He is belching but also passing lots of flatus. No stool or emesis. Has some confusion.       Objective     Patient Vitals for the past 24 hrs:   Temp Pulse Resp BP SpO2   10/10/17 0952 - (!) 152 - 106/80 -   10/10/17 0800 98.7 °F (37.1 °C) (!) 150 18 117/72 95 %   10/10/17 0700 - (!) 138 19 108/86 94 %   10/10/17 0600 - (!) 126 14 112/81 94 %   10/10/17 0500 - (!) 123 17 124/70 93 %   10/10/17 0400 98.4 °F (36.9 °C) (!) 122 28 133/78 93 %   10/10/17 0300 - (!) 119 18 117/65 95 %   10/10/17 0200 - (!) 114 20 118/65 95 %   10/10/17 0100 - (!) 139 19 90/61 93 %   10/10/17 0000 99.8 °F (37.7 °C) (!) 131 19 106/73 95 %   10/09/17 2340 - (!) 133 - 116/68 -   10/09/17 2300 - (!) 134 19 104/75 96 %   10/09/17 2237 - - - - 95 %   10/09/17 2200 - (!) 111 20 116/73 96 %   10/09/17 2100 - (!) 120 19 90/66 98 %   10/09/17 2000 98.7 °F (37.1 °C) (!) 136 19 94/52 97 %   10/09/17 1900 99.2 °F (37.3 °C) - - - -   10/09/17 1800 - (!) 113 20 104/69 96 %   10/09/17 1700 98.9 °F (37.2 °C) - - 110/61 -   10/09/17 1600 (!) 101.4 °F (38.6 °C) (!) 136 25 109/64 94 %   10/09/17 1500 - (!) 145 21 107/75 92 %   10/09/17 1400 - (!) 159 19 106/72 93 %   10/09/17 1315 - (!) 167 21 117/62 99 %   10/09/17 1300 - (!) 167 22 (!) 110/33 98 %   10/09/17 1230 - (!) 160 17 117/87 98 %   10/09/17 1200 98.2 °F (36.8 °C) (!) 145 20 102/74 96 %         Date 10/09/17 0700 - 10/10/17 0659 10/10/17 0700 - 10/11/17 0659   Shift 9602-2643 6455-2613 24 Hour Total 9768-4194 7888-3882 24 Hour Total   I  N  T  A  K  E   I.V.  (mL/kg/hr) 964.9  (0.8) 1588  (1.3) 2552.9  (1.1) 135.3  135.3      Cardizem Volume 33.1 155 188.1 20.3  20.3      Amiodarone Volume 130 383 513 40  40      Volume (lactated Ringers infusion) 548. 8 1050 1598.8 75  75      Volume (meropenem (MERREM) 500 mg in 0.9% sodium chloride (MBP/ADV) 50 mL) 250  250         Volume Infused (mL) (HYDROmorphone 10 mcg/mL - bupivacaine 0.0625% pf epidural) 3  3       Shift Total  (mL/kg) 964.9  (9.8) 1588  (16) 2552.9  (25.7) 135.3  (1.4)  135.3  (1.4)   O  U  T  P  U  T   Urine  (mL/kg/hr) 1800  (1.5) 1800  (1.5) 3600  (1.5) 90  90      Urine Output (mL) (Urinary Catheter 10/06/17 2- way; Vega) 1800 1800 3600 90  90    Drains 285 195 480 25  25      Output (ml) ([place] Neigh #1 10/06/17 Right; Lower Abdomen) 65 25 90 15  15      Output (ml) ([place] Neigh #2 10/06/17 Right; Lower Abdomen) 220 170 390 10  10    Shift Total  (mL/kg) 2085  (21.1) 1995  (20.1) 4080  (41.1) 115  (1.2)  115  (1.2)   NET -1120.1 -407 -1527.1 20.3  20.3   Weight (kg) 98.7 99.2 99.2 99.2 99.2 99.2       PE  GEN - Awake, alert, communicating appropriately. Appears uncomfortable. Pulm - CTAB  CV - tachycardic, regular rhythm  Abd - soft, moderately distended.  + BS. TTP mostly around drains in RLQ. Incision c/d/i. Drain output serous. Ext - warm, well perfused, trace edema.       Labs  Recent Results (from the past 24 hour(s))   EKG, 12 LEAD, INITIAL    Collection Time: 10/09/17 12:02 PM   Result Value Ref Range    Ventricular Rate 158 BPM    Atrial Rate 158 BPM    P-R Interval 80 ms    QRS Duration 90 ms    Q-T Interval 282 ms    QTC Calculation (Bezet) 457 ms    Calculated P Axis -90 degrees    Calculated R Axis -14 degrees    Calculated T Axis -88 degrees    Diagnosis       Undetermined rhythm  Inferior infarct , age undetermined  When compared with ECG of 21-SEP-2017 19:38,  Current undetermined rhythm precludes rhythm comparison, needs review  ST now depressed in Inferior leads  ST now depressed in Anterior leads  Nonspecific T wave abnormality now evident in Inferior leads     GLUCOSE, POC Collection Time: 10/09/17 12:24 PM   Result Value Ref Range    Glucose (POC) 118 (H) 65 - 100 mg/dL    Performed by [employer]    GLUCOSE, POC    Collection Time: 10/09/17  5:43 PM   Result Value Ref Range    Glucose (POC) 137 (H) 65 - 100 mg/dL    Performed by [employer]    GLUCOSE, POC    Collection Time: 10/09/17 11:19 PM   Result Value Ref Range    Glucose (POC) 151 (H) 65 - 100 mg/dL    Performed by [employer]    METABOLIC PANEL, COMPREHENSIVE    Collection Time: 10/10/17  3:29 AM   Result Value Ref Range    Sodium 126 (L) 136 - 145 mmol/L    Potassium 3.2 (L) 3.5 - 5.1 mmol/L    Chloride 91 (L) 97 - 108 mmol/L    CO2 26 21 - 32 mmol/L    Anion gap 9 5 - 15 mmol/L    Glucose 244 (H) 65 - 100 mg/dL    BUN 10 6 - 20 MG/DL    Creatinine 0.79 0.70 - 1.30 MG/DL    BUN/Creatinine ratio 13 12 - 20      GFR est AA >60 >60 ml/min/1.73m2    GFR est non-AA >60 >60 ml/min/1.73m2    Calcium 7.5 (L) 8.5 - 10.1 MG/DL    Bilirubin, total 1.5 (H) 0.2 - 1.0 MG/DL    ALT (SGPT) 52 12 - 78 U/L    AST (SGOT) 15 15 - 37 U/L    Alk.  phosphatase 67 45 - 117 U/L    Protein, total 4.7 (L) 6.4 - 8.2 g/dL    Albumin 1.9 (L) 3.5 - 5.0 g/dL    Globulin 2.8 2.0 - 4.0 g/dL    A-G Ratio 0.7 (L) 1.1 - 2.2     MAGNESIUM    Collection Time: 10/10/17  3:29 AM   Result Value Ref Range    Magnesium 1.7 1.6 - 2.4 mg/dL   PHOSPHORUS    Collection Time: 10/10/17  3:29 AM   Result Value Ref Range    Phosphorus 1.8 (L) 2.6 - 4.7 MG/DL   METABOLIC PANEL, BASIC    Collection Time: 10/10/17  3:29 AM   Result Value Ref Range    Sodium 127 (L) 136 - 145 mmol/L    Potassium 3.3 (L) 3.5 - 5.1 mmol/L    Chloride 93 (L) 97 - 108 mmol/L    CO2 26 21 - 32 mmol/L    Anion gap 8 5 - 15 mmol/L    Glucose 215 (H) 65 - 100 mg/dL    BUN 10 6 - 20 MG/DL    Creatinine 0.75 0.70 - 1.30 MG/DL    BUN/Creatinine ratio 13 12 - 20      GFR est AA >60 >60 ml/min/1.73m2    GFR est non-AA >60 >60 ml/min/1.73m2    Calcium 7.5 (L) 8.5 - 10.1 MG/DL   CBC WITH AUTOMATED DIFF Collection Time: 10/10/17  3:29 AM   Result Value Ref Range    WBC 12.7 (H) 4.1 - 11.1 K/uL    RBC 3.24 (L) 4.10 - 5.70 M/uL    HGB 10.0 (L) 12.1 - 17.0 g/dL    HCT 29.0 (L) 36.6 - 50.3 %    MCV 89.5 80.0 - 99.0 FL    MCH 30.9 26.0 - 34.0 PG    MCHC 34.5 30.0 - 36.5 g/dL    RDW 13.8 11.5 - 14.5 %    PLATELET 329 (L) 267 - 400 K/uL    NEUTROPHILS 78 (H) 32 - 75 %    BAND NEUTROPHILS 11 (H) 0 - 6 %    LYMPHOCYTES 3 (L) 12 - 49 %    MONOCYTES 8 5 - 13 %    EOSINOPHILS 0 0 - 7 %    BASOPHILS 0 0 - 1 %    ABS. NEUTROPHILS 11.3 (H) 1.8 - 8.0 K/UL    ABS. LYMPHOCYTES 0.4 (L) 0.8 - 3.5 K/UL    ABS. MONOCYTES 1.0 0.0 - 1.0 K/UL    ABS. EOSINOPHILS 0.0 0.0 - 0.4 K/UL    ABS. BASOPHILS 0.0 0.0 - 0.1 K/UL    DF MANUAL      RBC COMMENTS NORMOCYTIC, NORMOCHROMIC     GLUCOSE, POC    Collection Time: 10/10/17  5:06 AM   Result Value Ref Range    Glucose (POC) 143 (H) 65 - 100 mg/dL    Performed by Matthew Farahmerly is a 65 y. o.yr old male s/p whipple for distal cholangiocarcinoma. Pathology pending. Plan     N: Pain control has been poor since removal of epidural.  This may be contributing to his tachycardia. Adding basal to PCA and schedule tylenol suppositories. Will follow mental status. CV: [address] Cardiology input. On Amiodarone, cardizem and digoxin. Appears to be in A flutter. P: Continue pulmonary toilet, IS. Will give additional diuresis today. GI: Passing flatus but no stool yet. Will leave on full liquids today. Drains with mildly elevated lipase levels consistent with small pancreatic leak. Will leave drains in place to bulb suction. CT A/P planned for today. On pepcid for GI proph. ISS for any hyperglycemia. R: Hyponatremic, hypochloremic, hypokalemic, hypophosphatemia and hypomagnesemia. Will replace K, Phos and Mag today. IVF changed to NS. This is likely from fluid mobilization after surgery. Giving additional diuresis today.     H/ID: WBC up some today.  Hgb stable. Will keep on meropenem for now. F/u CT A/P.    MSK: changing DVT proph to lovenox. Continue SCD's. Dispo: Will keep in ICU for now.       Tonja Crawford MD  10/10/2017  10:24 AM

## 2017-10-10 NOTE — PROGRESS NOTES
Physical Therapy  10/10/17    Pt discussed in AM rounds - currently in uncontrolled afib on multiple rate controlling medications, sustaining 150s at rest. Deferred tx attempt this morning. Returned this PM at [phone], pt off unit at CT. Will plan to follow up tomorrow as appropriate for continued mobility progression.      Thank you,  Shaheen Dwyer, PT, DPT

## 2017-10-10 NOTE — PROGRESS NOTES
1930-Bedside and Verbal shift change report given to Courtney Pineda RN (oncoming nurse) by Griselda Collar, RN (offgoing nurse). Report included the following information SBAR, Kardex, Intake/Output, MAR, Recent Results, Med Rec Status and Cardiac Rhythm Uncontrolled Afib.     2000- Assumed patient care. Resting in bed, no complaint of pain. Re-educated on use of PCA. Patient in uncontrolled afib on cardizem & amiodarone drips & receiving scheduled doses of digoxin. Vega patent and draining. Alert and oriented x4--moving all extremities. 2100- Amiodarone decreased to 0.5 and cardizem decreased to 11.5. Current heart rate 120-130's, occasionally dropping into the 110's.

## 2017-10-11 ENCOUNTER — APPOINTMENT (OUTPATIENT)
Dept: GENERAL RADIOLOGY | Age: 62
DRG: 406 | End: 2017-10-11
Attending: NURSE PRACTITIONER
Payer: COMMERCIAL

## 2017-10-11 LAB
ALBUMIN SERPL-MCNC: 1.8 G/DL (ref 3.5–5)
ALBUMIN/GLOB SERPL: 0.6 {RATIO} (ref 1.1–2.2)
ALP SERPL-CCNC: 84 U/L (ref 45–117)
ALT SERPL-CCNC: 37 U/L (ref 12–78)
ANION GAP SERPL CALC-SCNC: 9 MMOL/L (ref 5–15)
AST SERPL-CCNC: 17 U/L (ref 15–37)
BASOPHILS # BLD: 0 K/UL (ref 0–0.1)
BASOPHILS NFR BLD: 0 % (ref 0–1)
BILIRUB SERPL-MCNC: 1.2 MG/DL (ref 0.2–1)
BUN SERPL-MCNC: 11 MG/DL (ref 6–20)
BUN/CREAT SERPL: 16 (ref 12–20)
CALCIUM SERPL-MCNC: 7.6 MG/DL (ref 8.5–10.1)
CHLORIDE SERPL-SCNC: 93 MMOL/L (ref 97–108)
CO2 SERPL-SCNC: 27 MMOL/L (ref 21–32)
CREAT SERPL-MCNC: 0.67 MG/DL (ref 0.7–1.3)
DIFFERENTIAL METHOD BLD: ABNORMAL
EOSINOPHIL # BLD: 0.5 K/UL (ref 0–0.4)
EOSINOPHIL NFR BLD: 3 % (ref 0–7)
ERYTHROCYTE [DISTWIDTH] IN BLOOD BY AUTOMATED COUNT: 14.2 % (ref 11.5–14.5)
GLOBULIN SER CALC-MCNC: 3 G/DL (ref 2–4)
GLUCOSE BLD STRIP.AUTO-MCNC: 141 MG/DL (ref 65–100)
GLUCOSE BLD STRIP.AUTO-MCNC: 142 MG/DL (ref 65–100)
GLUCOSE BLD STRIP.AUTO-MCNC: 143 MG/DL (ref 65–100)
GLUCOSE BLD STRIP.AUTO-MCNC: 146 MG/DL (ref 65–100)
GLUCOSE BLD STRIP.AUTO-MCNC: 159 MG/DL (ref 65–100)
GLUCOSE BLD STRIP.AUTO-MCNC: 172 MG/DL (ref 65–100)
GLUCOSE SERPL-MCNC: 147 MG/DL (ref 65–100)
HCT VFR BLD AUTO: 29.8 % (ref 36.6–50.3)
HGB BLD-MCNC: 10.2 G/DL (ref 12.1–17)
LYMPHOCYTES # BLD: 0.6 K/UL (ref 0.8–3.5)
LYMPHOCYTES NFR BLD: 4 % (ref 12–49)
MAGNESIUM SERPL-MCNC: 2.1 MG/DL (ref 1.6–2.4)
MCH RBC QN AUTO: 30.2 PG (ref 26–34)
MCHC RBC AUTO-ENTMCNC: 34.2 G/DL (ref 30–36.5)
MCV RBC AUTO: 88.2 FL (ref 80–99)
MONOCYTES # BLD: 0.6 K/UL (ref 0–1)
MONOCYTES NFR BLD: 4 % (ref 5–13)
NEUTS BAND NFR BLD MANUAL: 9 % (ref 0–6)
NEUTS SEG # BLD: 14.4 K/UL (ref 1.8–8)
NEUTS SEG NFR BLD: 80 % (ref 32–75)
PHOSPHATE SERPL-MCNC: 2.5 MG/DL (ref 2.6–4.7)
PLATELET # BLD AUTO: 176 K/UL (ref 150–400)
POTASSIUM SERPL-SCNC: 3.4 MMOL/L (ref 3.5–5.1)
PROT SERPL-MCNC: 4.8 G/DL (ref 6.4–8.2)
RBC # BLD AUTO: 3.38 M/UL (ref 4.1–5.7)
RBC MORPH BLD: ABNORMAL
RBC MORPH BLD: ABNORMAL
SERVICE CMNT-IMP: ABNORMAL
SODIUM SERPL-SCNC: 129 MMOL/L (ref 136–145)
WBC # BLD AUTO: 16.1 K/UL (ref 4.1–11.1)

## 2017-10-11 PROCEDURE — 71010 XR CHEST PORT: CPT

## 2017-10-11 PROCEDURE — 77030018719 HC DRSG PTCH ANTIMIC J&J -A

## 2017-10-11 PROCEDURE — 36569 INSJ PICC 5 YR+ W/O IMAGING: CPT | Performed by: NURSE PRACTITIONER

## 2017-10-11 PROCEDURE — 85025 COMPLETE CBC W/AUTO DIFF WBC: CPT | Performed by: SURGERY

## 2017-10-11 PROCEDURE — 80053 COMPREHEN METABOLIC PANEL: CPT | Performed by: SURGERY

## 2017-10-11 PROCEDURE — 74011250637 HC RX REV CODE- 250/637: Performed by: SURGERY

## 2017-10-11 PROCEDURE — 02HV33Z INSERTION OF INFUSION DEVICE INTO SUPERIOR VENA CAVA, PERCUTANEOUS APPROACH: ICD-10-PCS | Performed by: RADIOLOGY

## 2017-10-11 PROCEDURE — 74011250637 HC RX REV CODE- 250/637: Performed by: INTERNAL MEDICINE

## 2017-10-11 PROCEDURE — 93005 ELECTROCARDIOGRAM TRACING: CPT

## 2017-10-11 PROCEDURE — 97116 GAIT TRAINING THERAPY: CPT

## 2017-10-11 PROCEDURE — 84100 ASSAY OF PHOSPHORUS: CPT | Performed by: SURGERY

## 2017-10-11 PROCEDURE — 74011000258 HC RX REV CODE- 258: Performed by: INTERNAL MEDICINE

## 2017-10-11 PROCEDURE — 74011000250 HC RX REV CODE- 250: Performed by: SURGERY

## 2017-10-11 PROCEDURE — 74011250636 HC RX REV CODE- 250/636: Performed by: SURGERY

## 2017-10-11 PROCEDURE — 97530 THERAPEUTIC ACTIVITIES: CPT

## 2017-10-11 PROCEDURE — 74011000250 HC RX REV CODE- 250: Performed by: INTERNAL MEDICINE

## 2017-10-11 PROCEDURE — 74011250636 HC RX REV CODE- 250/636: Performed by: NURSE PRACTITIONER

## 2017-10-11 PROCEDURE — 77010033678 HC OXYGEN DAILY

## 2017-10-11 PROCEDURE — 77030020847 HC STATLOK BARD -A

## 2017-10-11 PROCEDURE — 65660000000 HC RM CCU STEPDOWN

## 2017-10-11 PROCEDURE — C1751 CATH, INF, PER/CENT/MIDLINE: HCPCS

## 2017-10-11 PROCEDURE — 82962 GLUCOSE BLOOD TEST: CPT

## 2017-10-11 PROCEDURE — 74011000258 HC RX REV CODE- 258: Performed by: SURGERY

## 2017-10-11 PROCEDURE — 76937 US GUIDE VASCULAR ACCESS: CPT

## 2017-10-11 PROCEDURE — 36415 COLL VENOUS BLD VENIPUNCTURE: CPT | Performed by: SURGERY

## 2017-10-11 PROCEDURE — 74011636637 HC RX REV CODE- 636/637: Performed by: SURGERY

## 2017-10-11 PROCEDURE — 74011250636 HC RX REV CODE- 250/636: Performed by: INTERNAL MEDICINE

## 2017-10-11 PROCEDURE — 77030020365 HC SOL INJ SOD CL 0.9% 50ML

## 2017-10-11 PROCEDURE — 83735 ASSAY OF MAGNESIUM: CPT | Performed by: SURGERY

## 2017-10-11 RX ORDER — ACETAMINOPHEN 325 MG/1
650 TABLET ORAL EVERY 6 HOURS
Status: DISCONTINUED | OUTPATIENT
Start: 2017-10-11 | End: 2017-10-13

## 2017-10-11 RX ORDER — FUROSEMIDE 10 MG/ML
20 INJECTION INTRAMUSCULAR; INTRAVENOUS ONCE
Status: COMPLETED | OUTPATIENT
Start: 2017-10-11 | End: 2017-10-11

## 2017-10-11 RX ORDER — SODIUM CHLORIDE 0.9 % (FLUSH) 0.9 %
10 SYRINGE (ML) INJECTION EVERY 24 HOURS
Status: DISCONTINUED | OUTPATIENT
Start: 2017-10-11 | End: 2017-10-16 | Stop reason: HOSPADM

## 2017-10-11 RX ORDER — SODIUM CHLORIDE 0.9 % (FLUSH) 0.9 %
20 SYRINGE (ML) INJECTION AS NEEDED
Status: DISCONTINUED | OUTPATIENT
Start: 2017-10-11 | End: 2017-10-16 | Stop reason: HOSPADM

## 2017-10-11 RX ORDER — SODIUM CHLORIDE 0.9 % (FLUSH) 0.9 %
10 SYRINGE (ML) INJECTION AS NEEDED
Status: DISCONTINUED | OUTPATIENT
Start: 2017-10-11 | End: 2017-10-16 | Stop reason: HOSPADM

## 2017-10-11 RX ORDER — SODIUM CHLORIDE 0.9 % (FLUSH) 0.9 %
10 SYRINGE (ML) INJECTION EVERY 8 HOURS
Status: DISCONTINUED | OUTPATIENT
Start: 2017-10-11 | End: 2017-10-16 | Stop reason: HOSPADM

## 2017-10-11 RX ORDER — FUROSEMIDE 10 MG/ML
20 INJECTION INTRAMUSCULAR; INTRAVENOUS ONCE
Status: DISCONTINUED | OUTPATIENT
Start: 2017-10-11 | End: 2017-10-11 | Stop reason: SDUPTHER

## 2017-10-11 RX ORDER — BACITRACIN 500 UNIT/G
1 PACKET (EA) TOPICAL AS NEEDED
Status: DISCONTINUED | OUTPATIENT
Start: 2017-10-11 | End: 2017-10-16 | Stop reason: HOSPADM

## 2017-10-11 RX ADMIN — ENOXAPARIN SODIUM 40 MG: 40 INJECTION SUBCUTANEOUS at 08:30

## 2017-10-11 RX ADMIN — Medication 10 ML: at 14:22

## 2017-10-11 RX ADMIN — DILTIAZEM HYDROCHLORIDE 15 MG/HR: 5 INJECTION, SOLUTION INTRAVENOUS at 05:53

## 2017-10-11 RX ADMIN — Medication: at 09:35

## 2017-10-11 RX ADMIN — SODIUM CHLORIDE 75 ML/HR: 900 INJECTION, SOLUTION INTRAVENOUS at 05:16

## 2017-10-11 RX ADMIN — GABAPENTIN 900 MG: 300 CAPSULE ORAL at 08:31

## 2017-10-11 RX ADMIN — POTASSIUM PHOSPHATE, MONOBASIC AND POTASSIUM PHOSPHATE, DIBASIC: 224; 236 INJECTION, SOLUTION INTRAVENOUS at 08:36

## 2017-10-11 RX ADMIN — ACETAMINOPHEN 650 MG: 325 TABLET, FILM COATED ORAL at 17:59

## 2017-10-11 RX ADMIN — Medication 10 ML: at 05:15

## 2017-10-11 RX ADMIN — MEROPENEM 500 MG: 500 INJECTION, POWDER, FOR SOLUTION INTRAVENOUS at 18:00

## 2017-10-11 RX ADMIN — Medication 10 ML: at 14:21

## 2017-10-11 RX ADMIN — MEROPENEM 500 MG: 500 INJECTION, POWDER, FOR SOLUTION INTRAVENOUS at 12:02

## 2017-10-11 RX ADMIN — INSULIN LISPRO 2 UNITS: 100 INJECTION, SOLUTION INTRAVENOUS; SUBCUTANEOUS at 17:59

## 2017-10-11 RX ADMIN — FUROSEMIDE 20 MG: 10 INJECTION, SOLUTION INTRAMUSCULAR; INTRAVENOUS at 08:31

## 2017-10-11 RX ADMIN — INSULIN LISPRO 2 UNITS: 100 INJECTION, SOLUTION INTRAVENOUS; SUBCUTANEOUS at 00:29

## 2017-10-11 RX ADMIN — FAMOTIDINE 20 MG: 10 INJECTION, SOLUTION INTRAVENOUS at 20:48

## 2017-10-11 RX ADMIN — MEROPENEM 500 MG: 500 INJECTION, POWDER, FOR SOLUTION INTRAVENOUS at 05:15

## 2017-10-11 RX ADMIN — ACETAMINOPHEN 650 MG: 325 TABLET, FILM COATED ORAL at 12:04

## 2017-10-11 RX ADMIN — GABAPENTIN 900 MG: 300 CAPSULE ORAL at 17:59

## 2017-10-11 RX ADMIN — FAMOTIDINE 20 MG: 10 INJECTION, SOLUTION INTRAVENOUS at 08:31

## 2017-10-11 RX ADMIN — AMIODARONE HYDROCHLORIDE 0.5 MG/MIN: 50 INJECTION, SOLUTION INTRAVENOUS at 10:49

## 2017-10-11 RX ADMIN — INSULIN LISPRO 2 UNITS: 100 INJECTION, SOLUTION INTRAVENOUS; SUBCUTANEOUS at 12:04

## 2017-10-11 RX ADMIN — ACETAMINOPHEN 1000 MG: 500 TABLET, FILM COATED ORAL at 04:57

## 2017-10-11 RX ADMIN — Medication 10 ML: at 21:05

## 2017-10-11 RX ADMIN — INSULIN LISPRO 2 UNITS: 100 INJECTION, SOLUTION INTRAVENOUS; SUBCUTANEOUS at 07:23

## 2017-10-11 RX ADMIN — MEROPENEM 500 MG: 500 INJECTION, POWDER, FOR SOLUTION INTRAVENOUS at 00:17

## 2017-10-11 NOTE — PROGRESS NOTES
Cardiology Progress Note    Pt states his abd pain is better. Remains in afib but rate control improved    Imp:  S/P Whipple for cholangiocarcinoma-no fever but WBC up  Pafib  Hyponatremic/hypokalemic  HTN  Sjogren's       Rec:  Remains in afib but rate controlled  Would wean diltiazem as tolerated. Will convert amio drip to po tomorrow if trend continues. Will need to decide about anticoagulation down the road-not a candidate at present. Blood pressure 126/88, pulse 99, temperature 97.4 °F (36.3 °C), resp. rate 11, height 5' 8\" (1.727 m), weight 101.9 kg (224 lb 10.4 oz), SpO2 95 %.   Weight trending up  Lungs clear  Cor irreg  Ext without edema  Abd distended with postive BS    Na 129, K 3.4  EKG pending  CT with R pararenal fluid collection; bilateral pl effusions; coronary calcification noted    Abby Gaytan MD

## 2017-10-11 NOTE — PROGRESS NOTES
[facility]  Pulmonary, Critical Care, and Sleep Medicine  Name: Edison Ibarra MRN: [id number]   : 1955 [facility]   Date: 10/11/2017          IMPRESSION:   1. 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 PAF/RVR- pt with h/o Afib and per his report underwent surgical ablation 10 yrs ago. Presently back in PAF/RVR likely due to LA stretch from post op fluid shifts ( often max at day 2-3 post op)- converted to NSR  3. AMS- suspect metabolic toxic-better  4. Hyponatremia- suspect hypervolemic hyponatremia  5. Fever/leukocytosis- ? Evolving abd collection. CT abd with 9 cm collection right anterior pararenal space. Not in vicinity of drains. On meropenem. ESBL Klebsiella on liver tissue cx  6. Sjogren's  7. HTN      Clinical Overview and Plan:   · dilt drip/amio drip  · Continue diuresis  · Clinically better but WBC climbing . Defer to surgery regarding drain of right pararenal fluid collection. More lasix  Nutrition  SUP  DVT proph           ABG No results for input(s): PHI, PO2I, PCO2I in the last 72 hours. Subjective/Interval History:   I have reviewed the flowsheet and previous days notes. Pertinent items are noted in HPI. Alert today. Sitting up in chair. Denies CP or SOb.        Objective:     Mode Rate Tidal Volume Pressure FiO2 PEEP                    Vital Signs:     TMAX(24)      Intake/Output:   Last shift:         Last 3 shifts: 10/11 0701 - 10/11 1900  In: 108 [I.V.:108]  Out: 15 [Drains:15]RRIOLAST3    Intake/Output Summary (Last 24 hours) at 10/11/17 1012  Last data filed at 10/11/17 0800   Gross per 24 hour   Intake          2516.68 ml   Output             1800 ml   Net           716.68 ml     EXAM:   HEENT:  PERRL, EOMI, no alar flaring or epistaxis, oral mucosa moist without cyanosis, NECK:  no jugular vein distention, no retractions, no thyromegaly or masses, LUNGS: decreased breath sounds billaterally and with rhonchi , HEART:  Regular rate and rhythm with no MGR; no edema is present, ABDOMEN:  soft with no tenderness, bowel sounds present, EXTREMITIES:  warm with no cyanosis and SKIN:  no jaundice or ecchymosis        Data    I have personally reviewed data, flowsheets for the last 24 hours.         Labs:  Recent Labs      10/11/17   0514  10/10/17   0329  10/09/17   0332   WBC  16.1*  12.7*  10.2   HGB  10.2*  10.0*  10.0*   HCT  29.8*  29.0*  29.5*   PLT  176  140*  103*     Recent Labs      10/11/17   0514  10/10/17   0329  10/09/17   0332   NA  129*  126*  127*  132*   K  3.4*  3.2*  3.3*  3.3*   CL  93*  91*  93*  97   CO2  27  26  26  27   GLU  147*  244*  215*  116*   BUN  11  10  10  14   CREA  0.67*  0.79  0.75  0.89   CA  7.6*  7.5*  7.5*  7.6*   MG  2.1  1.7  1.6   PHOS  2.5*  1.8*  1.8*   ALB  1.8*  1.9*  2.2*   SGOT  17  15  23   ALT  37  52  85*       Naomy Manley MD  [facility]

## 2017-10-11 NOTE — PROGRESS NOTES
0107: Patient's heart rate fluctuating in the 130's-140's, atrial fib on the monitor. Increased cardizem drip to 15 mg/hr. 0210: HR trending down in the high 90's to low 100's. Patient sleeping. Will monitor. Shift Summary: Patient had an uneventful shift. VSs. Alert and oriented x 4, but rambles in conversation at times. Follows commands appropriately. Remains in atrial fib but heart rate better controlled this morning since increasing the cardizem drip. Attempting to wean cardizem drip.

## 2017-10-11 NOTE — PROGRESS NOTES
[facility] General Surgery    POD #5 s/p pylorus preserving whipple for distal cholangiocarcinoma. Subjective     Patient afebrile yesterday. Feels a little better today with better pain control. Still having mostly RLQ pain and lower abdominal discomfort. No n/v.  Passing flatus but distended and without stool. HR fluctuating still but in 110's this morning. Confusion a little better this morning.       Objective     Patient Vitals for the past 24 hrs:   Temp Pulse Resp BP SpO2   10/11/17 0701 - (!) 127 12 100/84 97 %   10/11/17 0700 - (!) 126 - - 94 %   10/11/17 0600 - 91 9 108/75 94 %   10/11/17 0500 - 100 13 106/70 98 %   10/11/17 0430 - (!) 103 14 112/63 100 %   10/11/17 0415 - 98 9 115/74 94 %   10/11/17 0401 - (!) 102 10 121/80 94 %   10/11/17 0400 - 97 9 - 93 %   10/11/17 0320 - (!) 105 14 111/89 96 %   10/11/17 0300 - (!) 105 19 114/77 93 %   10/11/17 0200 - (!) 118 21 109/74 95 %   10/11/17 0100 - (!) 144 20 94/67 97 %   10/11/17 0000 98 °F (36.7 °C) (!) 158 26 135/81 97 %   10/10/17 2300 - (!) 112 14 131/80 96 %   10/10/17 2200 - (!) 109 12 115/57 93 %   10/10/17 2100 - (!) 101 17 117/67 95 %   10/10/17 2000 98.4 °F (36.9 °C) 91 12 126/69 96 %   10/10/17 1900 - 97 12 129/76 95 %   10/10/17 1800 - (!) 118 27 - 93 %   10/10/17 1700 - (!) 110 21 114/83 -   10/10/17 1600 98.5 °F (36.9 °C) (!) 121 24 126/81 94 %   10/10/17 1500 - (!) 103 15 122/76 96 %   10/10/17 1400 - (!) 113 17 114/83 94 %   10/10/17 1300 - (!) 152 20 108/66 96 %   10/10/17 1200 98.3 °F (36.8 °C) (!) 128 17 101/76 95 %   10/10/17 1119 - (!) 151 - 111/82 -   10/10/17 1100 - (!) 151 15 111/82 95 %   10/10/17 1000 - (!) 138 21 (!) 89/78 99 %   10/10/17 0952 - (!) 152 - 106/80 -   10/10/17 0900 - (!) 140 15 130/72 99 %   10/10/17 0800 98.7 °F (37.1 °C) (!) 150 18 117/72 95 %         Date 10/10/17 0700 - 10/11/17 0659 10/11/17 0700 - 10/12/17 0659   Shift 6589-8088 3653-1069 24 Hour Total 0035-6527 3819-6127 24 Hour Total   I  N  T  A  K  E   I.V.  (mL/kg/hr) 1275  (1.1) 864.7  (0.7) 2139.7  (0.9)         Cardizem Volume 145. 3 104.7 250         Amiodarone Volume 336 160 496         Volume (lactated Ringers infusion) 75  75         Volume (0.9% sodium chloride infusion) 618.8 600 1218.8         Volume (meropenem (MERREM) 500 mg in 0.9% sodium chloride (MBP/ADV) 50 mL) 100  100       Shift Total  (mL/kg) 1275  (12.9) 864.7  (8.7) 2139.7  (21.6)      O  U  T  P  U  T   Urine  (mL/kg/hr) 1500  (1.3) 395  (0.3) 1895  (0.8)         Urine Voided  300 300         Urine Occurrence(s)  1 x 1 x         Urine Output (mL) ([REMOVED] Urinary Catheter 10/06/17 2- way; [place]) 1500 95 1595       Drains 75 30 105         Output (ml) (Donzella Bogus #1 10/06/17 Right; Lower Abdomen) 35 20 55         Output (ml) (Donzella Bogus #2 10/06/17 Right; Lower Abdomen) 40 10 50       Shift Total  (mL/kg) 1575  (15.9) 425  (4.3) 2000  (20.2)      NET -300 439.7 139.7      Weight (kg) 99.2 99.2 99.2 99.2 99.2 99.2       PE  GEN - Awake, alert, communicating appropriately. Appears more comfortable. Pulm - CTAB  CV - tachycardic, regular rhythm, rate 110's. Abd - soft, moderately distended.  + BS. TTP mostly around drains in RLQ. Incision c/d/i. Drain output serous. Ext - warm, well perfused, trace edema.       Labs  Recent Results (from the past 24 hour(s))   EKG, 12 LEAD, INITIAL    Collection Time: 10/10/17  9:33 AM   Result Value Ref Range    Ventricular Rate 153 BPM    Atrial Rate 306 BPM    QRS Duration 98 ms    Q-T Interval 250 ms    QTC Calculation (Bezet) 399 ms    Calculated P Axis -58 degrees    Calculated R Axis -26 degrees    Calculated T Axis 142 degrees    Diagnosis       Atrial fibrillation  Inferior infarct , age undetermined  T wave abnormality, consider lateral ischemia  Confirmed by Ladi Dey MD ([id number]) on 10/10/2017 6:32:11 PM     GLUCOSE, POC    Collection Time: 10/10/17 12:15 PM   Result Value Ref Range    Glucose (POC) 124 (H) 65 - 100 mg/dL    Performed by [facility]    GLUCOSE, POC    Collection Time: 10/10/17  5:34 PM   Result Value Ref Range    Glucose (POC) 140 (H) 65 - 100 mg/dL    Performed by [facility]    GLUCOSE, POC    Collection Time: 10/11/17 12:16 AM   Result Value Ref Range    Glucose (POC) 141 (H) 65 - 100 mg/dL    Performed by Daniel Shi, POC    Collection Time: 10/11/17  5:12 AM   Result Value Ref Range    Glucose (POC) 172 (H) 65 - 100 mg/dL    Performed by [address] [place] [address]    Collection Time: 10/11/17  5:14 AM   Result Value Ref Range    Magnesium 2.1 1.6 - 2.4 mg/dL   PHOSPHORUS    Collection Time: 10/11/17  5:14 AM   Result Value Ref Range    Phosphorus 2.5 (L) 2.6 - 4.7 MG/DL   METABOLIC PANEL, COMPREHENSIVE    Collection Time: 10/11/17  5:14 AM   Result Value Ref Range    Sodium 129 (L) 136 - 145 mmol/L    Potassium 3.4 (L) 3.5 - 5.1 mmol/L    Chloride 93 (L) 97 - 108 mmol/L    CO2 27 21 - 32 mmol/L    Anion gap 9 5 - 15 mmol/L    Glucose 147 (H) 65 - 100 mg/dL    BUN 11 6 - 20 MG/DL    Creatinine 0.67 (L) 0.70 - 1.30 MG/DL    BUN/Creatinine ratio 16 12 - 20      GFR est AA >60 >60 ml/min/1.73m2    GFR est non-AA >60 >60 ml/min/1.73m2    Calcium 7.6 (L) 8.5 - 10.1 MG/DL    Bilirubin, total 1.2 (H) 0.2 - 1.0 MG/DL    ALT (SGPT) 37 12 - 78 U/L    AST (SGOT) 17 15 - 37 U/L    Alk.  phosphatase 84 45 - 117 U/L    Protein, total 4.8 (L) 6.4 - 8.2 g/dL    Albumin 1.8 (L) 3.5 - 5.0 g/dL    Globulin 3.0 2.0 - 4.0 g/dL    A-G Ratio 0.6 (L) 1.1 - 2.2     CBC WITH AUTOMATED DIFF    Collection Time: 10/11/17  5:14 AM   Result Value Ref Range    WBC 16.1 (H) 4.1 - 11.1 K/uL    RBC 3.38 (L) 4.10 - 5.70 M/uL    HGB 10.2 (L) 12.1 - 17.0 g/dL    HCT 29.8 (L) 36.6 - 50.3 %    MCV 88.2 80.0 - 99.0 FL    MCH 30.2 26.0 - 34.0 PG    MCHC 34.2 30.0 - 36.5 g/dL    RDW 14.2 11.5 - 14.5 %    PLATELET 778 100 - 822 K/uL    NEUTROPHILS 80 (H) 32 - 75 %    BAND NEUTROPHILS 9 (H) 0 - 6 %    LYMPHOCYTES 4 (L) 12 - 49 %    MONOCYTES 4 (L) 5 - 13 %    EOSINOPHILS 3 0 - 7 %    BASOPHILS 0 0 - 1 %    ABS. NEUTROPHILS 14.4 (H) 1.8 - 8.0 K/UL    ABS. LYMPHOCYTES 0.6 (L) 0.8 - 3.5 K/UL    ABS. MONOCYTES 0.6 0.0 - 1.0 K/UL    ABS. EOSINOPHILS 0.5 (H) 0.0 - 0.4 K/UL    ABS. BASOPHILS 0.0 0.0 - 0.1 K/UL    DF MANUAL      RBC COMMENTS ANISOCYTOSIS  1+        RBC COMMENTS POLYCHROMASIA  1+       GLUCOSE, POC    Collection Time: 10/11/17  7:16 AM   Result Value Ref Range    Glucose (POC) 142 (H) 65 - 100 mg/dL    Performed by Tatiana. 73 is a 65 y. o.yr old male s/p whipple for distal cholangiocarcinoma. Pathology pending. Plan     N: Pain control improved. Continue pca, tylenol. CV: [address] Cardiology input. On Amiodarone, cardizem and digoxin. Remains tachycardic. P: Continue pulmonary toilet, IS. Will another dose of diuresis today. GI: Passing flatus but no stool still. Will leave on full liquids. Drains serosanguinous. CT yesterday showed small collection near pancreatic anastomosis. I am planning to watch this for now and will hold off on additional drain as long as he is not having futher clinical deterioration. Will leave current drains in place to bulb suction. On pepcid for GI proph. ISS for any hyperglycemia. R: Hyponatremic, hypochloremic, hypokalemic and hypophosphatemia. Will replace K, Phos today. IVF on low rate NS. Giving additional diuresis today. H/ID: WBC up again some today. Hgb stable. Will keep on meropenem. MSK: DVT proph with lovenox. Continue SCD's. Dispo: Will keep in ICU for now.       Sky Nuñez MD  10/11/2017  7:47 AM

## 2017-10-11 NOTE — PROGRESS NOTES
Problem: Falls - Risk of  Goal: *Absence of Falls  Document Rosalia Fall Risk and appropriate interventions in the flowsheet.    Outcome: Progressing Towards Goal  Fall Risk Interventions:  Mobility Interventions: Assess mobility with egress test, Communicate number of staff needed for ambulation/transfer, Patient to call before getting OOB, PT Consult for mobility concerns, PT Consult for assist device competence, Strengthening exercises (ROM-active/passive), Utilize walker, cane, or other assitive device, Utilize gait belt for transfers/ambulation, Mechanical lift           Medication Interventions: Assess postural VS orthostatic hypotension, Evaluate medications/consider consulting pharmacy, Patient to call before getting OOB, Teach patient to arise slowly, Utilize gait belt for transfers/ambulation     Elimination Interventions: Call light in reach, Elevated toilet seat, Patient to call for help with toileting needs

## 2017-10-11 NOTE — PROGRESS NOTES
0730: Bedside and Verbal shift change report given to Raafela Conrad RN (oncoming nurse) by Obinna Gandhi (offgoing nurse). Report included the following information SBAR, Kardex, Intake/Output, MAR, Accordion, Recent Results and Cardiac Rhythm a fib.     0900: Patient pulled out IV line. Called primary team requesting PICC as patient has pulled out multiple IVs over last several days and requires multiple access sites. 1315: PICC Team at bedside for placement. 1615: Patient converted to NSR.    1930: Shift Summary: More alert today, oriented x 4. Converted to NSR, titrating cardizem, remains on amiodarone at 0.5. BP WNL. Afebrile. No BM, + flattus. Voiding in urinal. Up to chair x 2, ambulated with PT. CHRIS drainage serosanguinous. Pain well-controlled.

## 2017-10-11 NOTE — PROGRESS NOTES
Problem: Mobility Impaired (Adult and Pediatric)  Goal: *Acute Goals and Plan of Care (Insert Text)  Physical Therapy Goals  Initiated 10/7/2017  1. Patient will move from supine to sit and sit to supine in bed with modified independence within 7 day(s). 2. Patient will transfer from bed to chair and chair to bed with modified independence using the least restrictive device within 7 day(s). 3. Patient will perform sit to stand with modified independence within 7 day(s). 4. Patient will ambulate with modified independence for 200 feet with the least restrictive device within 7 day(s). 5. Patient will ascend/descend 6 stairs with use of handrail(s) with modified independence within 7 day(s). PHYSICAL THERAPY TREATMENT  Patient: Len Ruff (64 y.o. male)  Date: 10/11/2017  Diagnosis: CANCER   Cholangiocarcinoma determined by biopsy of biliary tract ([facility] [address] <principal problem not specified>  Procedure(s) (LRB):  WHIPPLE PROCEDURE  (N/A) 5 Days Post-Op  Precautions: Fall      ASSESSMENT:  Cleared for mobility progression by RN - HR better controlled in the 90s to low 100s. Reports pain is also significantly better. Occasional confusion during conversation noted. Reviewed education on bracing with pillow while coughing and log roll sequencing for bed mobility to reduce abdominal strain. He was able to complete supine>sit from flat bed with up to min A bringing trunk to upright from sidelying position; completed repeated sit<>stands for functional strengthening with CGA; gait training progressed in room to approx 60ft with CGA/SBA - marked improvement in overall stability and upright posture - will plan to progress gait distance in hallway tomorrow. Assisted back to bed per RN request (IV line switch outs), all VSS. Anticipate that pt will be appropriate for discharge home with possible HHPT pending continued progress.       Progression toward goals:  [X]    Improving appropriately and progressing toward goals  [ ]    Improving slowly and progressing toward goals  [ ]    Not making progress toward goals and plan of care will be adjusted       PLAN:  Patient continues to benefit from skilled intervention to address the above impairments. Continue treatment per established plan of care. Discharge Recommendations:  Home Health and None  Further Equipment Recommendations for Discharge:  TBD       SUBJECTIVE:   Patient stated I don't remember really anything from yesterday.       OBJECTIVE DATA SUMMARY:   Critical Behavior:  Neurologic State: Drowsy, Eyes open spontaneously  Orientation Level: Oriented X4  Cognition: Follows commands  Safety/Judgement: Awareness of environment  Functional Mobility Training:  Bed Mobility:     Supine to Sit: Minimum assistance  Sit to Supine: Stand-by asssistance     Transfers:  Sit to Stand: Contact guard assistance;Stand-by asssistance  Stand to Sit: Contact guard assistance     Balance:  Sitting: Intact  Standing: Impaired; Without support  Standing - Static: Good  Standing - Dynamic : Fair  Ambulation/Gait Training:  Distance (ft): 60 Feet (ft)  Assistive Device: Gait belt  Ambulation - Level of Assistance: Contact guard assistance  Gait Abnormalities: Antalgic;Decreased step clearance  Base of Support: Narrowed  Speed/Radha: Slow;Shuffled  Step Length: Right shortened;Left shortened     Pain:  Pain Scale 1: Numeric (0 - 10)  Pain Intensity 1: 2  Pain Location 1: Abdomen  Pain Orientation 1: Right; Lower  Pain Description 1: Aching  Pain Intervention(s) 1: Encouraged PCA  Activity Tolerance:   VSS     Please refer to the flowsheet for vital signs taken during this treatment.   After treatment:   [ ]    Patient left in no apparent distress sitting up in chair  [X]    Patient left in no apparent distress in bed  [X]    Call bell left within reach  [X]    Nursing notified  [ ]    Caregiver present  [ ]    Bed alarm activated      COMMUNICATION/COLLABORATION:   The patients plan of care was discussed with: Registered Nurse     Shad Ivory PT, DPT   Time Calculation: 25 mins

## 2017-10-11 NOTE — ROUTINE PROCESS
Bedside and Verbal shift change report given to Carl Orellana (oncoming nurse) by Doris Quintero (offgoing nurse). Report included the following information SBAR, Kardex, Intake/Output, MAR, Recent Results, Cardiac Rhythm Atrial Fib and Alarm Parameters .

## 2017-10-11 NOTE — PROCEDURES
PICC Placement Note    PRE-PROCEDURE VERIFICATION  Correct Procedure: yes  Correct Site:  yes  Temperature: Temp: 97.7 °F (36.5 °C), Temperature Source: Temp Source: Oral  Recent Labs      10/11/17   0514   BUN  11   CREA  0.67*   PLT  176   WBC  16.1*     Allergies: Other food and Pcn [penicillins]  Education materials, including PICC Booklet, for PICC Care given to patient: yes. See Patient Education activity for further details. PROCEDURE DETAIL  A triple lumen PICC line was started for vascular access and nurse/physician request. The following documentation is in addition to the PICC properties in the lines/airways flowsheet :  Lot #: [id number]  Was xylocaine 1% used intradermally:  yes  Catheter Length: 41 (cm)  Vein Selection for PICC:right basilic  Central Line Bundle followed yes  Complication Related to Insertion: none    The placement was verified by CXR:  The tip location is on the right side and the tip is in the superior vena cava. See ECG results for PICC tip placement. Report given to nurse Lj Webber. RN    Line is okay to use.     Inez Robertson RN

## 2017-10-12 LAB
ALBUMIN SERPL-MCNC: 1.8 G/DL (ref 3.5–5)
ALBUMIN/GLOB SERPL: 0.6 {RATIO} (ref 1.1–2.2)
ALP SERPL-CCNC: 74 U/L (ref 45–117)
ALT SERPL-CCNC: 28 U/L (ref 12–78)
ANION GAP SERPL CALC-SCNC: 8 MMOL/L (ref 5–15)
AST SERPL-CCNC: 12 U/L (ref 15–37)
ATRIAL RATE: 110 BPM
ATRIAL RATE: 74 BPM
BASOPHILS # BLD: 0 K/UL (ref 0–0.1)
BASOPHILS NFR BLD: 0 % (ref 0–1)
BILIRUB SERPL-MCNC: 0.7 MG/DL (ref 0.2–1)
BUN SERPL-MCNC: 9 MG/DL (ref 6–20)
BUN/CREAT SERPL: 16 (ref 12–20)
CALCIUM SERPL-MCNC: 7.5 MG/DL (ref 8.5–10.1)
CALCULATED P AXIS, ECG09: 57 DEGREES
CALCULATED R AXIS, ECG10: -10 DEGREES
CALCULATED R AXIS, ECG10: -4 DEGREES
CALCULATED T AXIS, ECG11: 150 DEGREES
CALCULATED T AXIS, ECG11: 17 DEGREES
CHLORIDE SERPL-SCNC: 92 MMOL/L (ref 97–108)
CO2 SERPL-SCNC: 30 MMOL/L (ref 21–32)
CREAT SERPL-MCNC: 0.57 MG/DL (ref 0.7–1.3)
DIAGNOSIS, 93000: NORMAL
DIAGNOSIS, 93000: NORMAL
DIFFERENTIAL METHOD BLD: ABNORMAL
EOSINOPHIL # BLD: 0.3 K/UL (ref 0–0.4)
EOSINOPHIL NFR BLD: 2 % (ref 0–7)
ERYTHROCYTE [DISTWIDTH] IN BLOOD BY AUTOMATED COUNT: 14.5 % (ref 11.5–14.5)
GLOBULIN SER CALC-MCNC: 2.8 G/DL (ref 2–4)
GLUCOSE BLD STRIP.AUTO-MCNC: 102 MG/DL (ref 65–100)
GLUCOSE BLD STRIP.AUTO-MCNC: 129 MG/DL (ref 65–100)
GLUCOSE BLD STRIP.AUTO-MCNC: 132 MG/DL (ref 65–100)
GLUCOSE BLD STRIP.AUTO-MCNC: 147 MG/DL (ref 65–100)
GLUCOSE SERPL-MCNC: 137 MG/DL (ref 65–100)
HCT VFR BLD AUTO: 27.3 % (ref 36.6–50.3)
HGB BLD-MCNC: 10.3 G/DL (ref 12.1–17)
HGB BLD-MCNC: 11.4 G/DL (ref 12.1–17)
HGB BLD-MCNC: 13.5 G/DL (ref 12.1–17)
HGB BLD-MCNC: 9.2 G/DL (ref 12.1–17)
LYMPHOCYTES # BLD: 0.3 K/UL (ref 0.8–3.5)
LYMPHOCYTES NFR BLD: 2 % (ref 12–49)
MAGNESIUM SERPL-MCNC: 1.9 MG/DL (ref 1.6–2.4)
MCH RBC QN AUTO: 29.7 PG (ref 26–34)
MCHC RBC AUTO-ENTMCNC: 33.7 G/DL (ref 30–36.5)
MCV RBC AUTO: 88.1 FL (ref 80–99)
METAMYELOCYTES NFR BLD MANUAL: 2 %
MONOCYTES # BLD: 0.8 K/UL (ref 0–1)
MONOCYTES NFR BLD: 5 % (ref 5–13)
NEUTS BAND NFR BLD MANUAL: 5 % (ref 0–6)
NEUTS SEG # BLD: 14 K/UL (ref 1.8–8)
NEUTS SEG NFR BLD: 84 % (ref 32–75)
P-R INTERVAL, ECG05: 164 MS
PHOSPHATE SERPL-MCNC: 2.4 MG/DL (ref 2.6–4.7)
PLATELET # BLD AUTO: 178 K/UL (ref 150–400)
POTASSIUM SERPL-SCNC: 3 MMOL/L (ref 3.5–5.1)
PROT SERPL-MCNC: 4.6 G/DL (ref 6.4–8.2)
Q-T INTERVAL, ECG07: 376 MS
Q-T INTERVAL, ECG07: 518 MS
QRS DURATION, ECG06: 106 MS
QRS DURATION, ECG06: 108 MS
QTC CALCULATION (BEZET), ECG08: 459 MS
QTC CALCULATION (BEZET), ECG08: 574 MS
RBC # BLD AUTO: 3.1 M/UL (ref 4.1–5.7)
RBC MORPH BLD: ABNORMAL
RBC MORPH BLD: ABNORMAL
SERVICE CMNT-IMP: ABNORMAL
SODIUM SERPL-SCNC: 130 MMOL/L (ref 136–145)
VENTRICULAR RATE, ECG03: 74 BPM
VENTRICULAR RATE, ECG03: 90 BPM
WBC # BLD AUTO: 15.7 K/UL (ref 4.1–11.1)

## 2017-10-12 PROCEDURE — 74011000258 HC RX REV CODE- 258: Performed by: SURGERY

## 2017-10-12 PROCEDURE — 77010033678 HC OXYGEN DAILY

## 2017-10-12 PROCEDURE — 93005 ELECTROCARDIOGRAM TRACING: CPT

## 2017-10-12 PROCEDURE — 74011250636 HC RX REV CODE- 250/636: Performed by: SURGERY

## 2017-10-12 PROCEDURE — 82962 GLUCOSE BLOOD TEST: CPT

## 2017-10-12 PROCEDURE — 74011000250 HC RX REV CODE- 250: Performed by: INTERNAL MEDICINE

## 2017-10-12 PROCEDURE — 74011000250 HC RX REV CODE- 250: Performed by: SURGERY

## 2017-10-12 PROCEDURE — 36592 COLLECT BLOOD FROM PICC: CPT | Performed by: NURSE PRACTITIONER

## 2017-10-12 PROCEDURE — 74011250637 HC RX REV CODE- 250/637: Performed by: SURGERY

## 2017-10-12 PROCEDURE — 84100 ASSAY OF PHOSPHORUS: CPT | Performed by: SURGERY

## 2017-10-12 PROCEDURE — 74011636637 HC RX REV CODE- 636/637: Performed by: SURGERY

## 2017-10-12 PROCEDURE — 36415 COLL VENOUS BLD VENIPUNCTURE: CPT | Performed by: SURGERY

## 2017-10-12 PROCEDURE — 83735 ASSAY OF MAGNESIUM: CPT | Performed by: SURGERY

## 2017-10-12 PROCEDURE — 85025 COMPLETE CBC W/AUTO DIFF WBC: CPT | Performed by: SURGERY

## 2017-10-12 PROCEDURE — 74011250637 HC RX REV CODE- 250/637: Performed by: INTERNAL MEDICINE

## 2017-10-12 PROCEDURE — 74011250636 HC RX REV CODE- 250/636: Performed by: INTERNAL MEDICINE

## 2017-10-12 PROCEDURE — 74011000258 HC RX REV CODE- 258: Performed by: INTERNAL MEDICINE

## 2017-10-12 PROCEDURE — 65660000000 HC RM CCU STEPDOWN

## 2017-10-12 PROCEDURE — 80053 COMPREHEN METABOLIC PANEL: CPT | Performed by: SURGERY

## 2017-10-12 RX ORDER — POTASSIUM CHLORIDE 750 MG/1
20 TABLET, FILM COATED, EXTENDED RELEASE ORAL 2 TIMES DAILY
Status: COMPLETED | OUTPATIENT
Start: 2017-10-12 | End: 2017-10-12

## 2017-10-12 RX ORDER — AMIODARONE HYDROCHLORIDE 200 MG/1
200 TABLET ORAL 2 TIMES DAILY
Status: DISCONTINUED | OUTPATIENT
Start: 2017-10-12 | End: 2017-10-16 | Stop reason: HOSPADM

## 2017-10-12 RX ORDER — DOCUSATE SODIUM 100 MG/1
100 CAPSULE, LIQUID FILLED ORAL 2 TIMES DAILY
Status: DISCONTINUED | OUTPATIENT
Start: 2017-10-12 | End: 2017-10-16 | Stop reason: HOSPADM

## 2017-10-12 RX ORDER — FUROSEMIDE 10 MG/ML
20 INJECTION INTRAMUSCULAR; INTRAVENOUS ONCE
Status: COMPLETED | OUTPATIENT
Start: 2017-10-12 | End: 2017-10-12

## 2017-10-12 RX ORDER — MAGNESIUM SULFATE HEPTAHYDRATE 40 MG/ML
2 INJECTION, SOLUTION INTRAVENOUS ONCE
Status: COMPLETED | OUTPATIENT
Start: 2017-10-12 | End: 2017-10-15

## 2017-10-12 RX ADMIN — ENOXAPARIN SODIUM 40 MG: 40 INJECTION SUBCUTANEOUS at 08:50

## 2017-10-12 RX ADMIN — AMIODARONE HYDROCHLORIDE 200 MG: 200 TABLET ORAL at 11:09

## 2017-10-12 RX ADMIN — Medication 10 ML: at 14:37

## 2017-10-12 RX ADMIN — ACETAMINOPHEN 650 MG: 325 TABLET, FILM COATED ORAL at 00:29

## 2017-10-12 RX ADMIN — POTASSIUM CHLORIDE 20 MEQ: 750 TABLET, FILM COATED, EXTENDED RELEASE ORAL at 18:48

## 2017-10-12 RX ADMIN — Medication 10 ML: at 06:37

## 2017-10-12 RX ADMIN — INSULIN LISPRO 2 UNITS: 100 INJECTION, SOLUTION INTRAVENOUS; SUBCUTANEOUS at 12:25

## 2017-10-12 RX ADMIN — ACETAMINOPHEN 650 MG: 325 TABLET, FILM COATED ORAL at 18:48

## 2017-10-12 RX ADMIN — Medication 10 ML: at 14:36

## 2017-10-12 RX ADMIN — AMIODARONE HYDROCHLORIDE 200 MG: 200 TABLET ORAL at 18:48

## 2017-10-12 RX ADMIN — ACETAMINOPHEN 650 MG: 325 TABLET, FILM COATED ORAL at 12:24

## 2017-10-12 RX ADMIN — Medication 10 ML: at 22:19

## 2017-10-12 RX ADMIN — SODIUM CHLORIDE: 900 INJECTION, SOLUTION INTRAVENOUS at 11:20

## 2017-10-12 RX ADMIN — GABAPENTIN 900 MG: 300 CAPSULE ORAL at 18:48

## 2017-10-12 RX ADMIN — Medication: at 10:57

## 2017-10-12 RX ADMIN — AMIODARONE HYDROCHLORIDE 0.5 MG/MIN: 50 INJECTION, SOLUTION INTRAVENOUS at 02:12

## 2017-10-12 RX ADMIN — MEROPENEM 500 MG: 500 INJECTION, POWDER, FOR SOLUTION INTRAVENOUS at 00:27

## 2017-10-12 RX ADMIN — Medication 10 ML: at 22:18

## 2017-10-12 RX ADMIN — ACETAMINOPHEN 650 MG: 325 TABLET, FILM COATED ORAL at 06:35

## 2017-10-12 RX ADMIN — POTASSIUM CHLORIDE 20 MEQ: 750 TABLET, FILM COATED, EXTENDED RELEASE ORAL at 11:09

## 2017-10-12 RX ADMIN — MEROPENEM 500 MG: 500 INJECTION, POWDER, FOR SOLUTION INTRAVENOUS at 06:35

## 2017-10-12 RX ADMIN — DILTIAZEM HYDROCHLORIDE 3 MG/HR: 5 INJECTION, SOLUTION INTRAVENOUS at 04:44

## 2017-10-12 RX ADMIN — SODIUM CHLORIDE 75 ML/HR: 900 INJECTION, SOLUTION INTRAVENOUS at 04:44

## 2017-10-12 RX ADMIN — MEROPENEM 500 MG: 500 INJECTION, POWDER, FOR SOLUTION INTRAVENOUS at 12:25

## 2017-10-12 RX ADMIN — MEROPENEM 500 MG: 500 INJECTION, POWDER, FOR SOLUTION INTRAVENOUS at 18:48

## 2017-10-12 RX ADMIN — MAGNESIUM SULFATE HEPTAHYDRATE 2 G: 40 INJECTION, SOLUTION INTRAVENOUS at 11:11

## 2017-10-12 RX ADMIN — FUROSEMIDE 20 MG: 10 INJECTION, SOLUTION INTRAMUSCULAR; INTRAVENOUS at 11:04

## 2017-10-12 RX ADMIN — GABAPENTIN 900 MG: 300 CAPSULE ORAL at 08:50

## 2017-10-12 RX ADMIN — DOCUSATE SODIUM 100 MG: 100 CAPSULE, LIQUID FILLED ORAL at 18:51

## 2017-10-12 RX ADMIN — DOCUSATE SODIUM 100 MG: 100 CAPSULE, LIQUID FILLED ORAL at 11:09

## 2017-10-12 RX ADMIN — Medication: at 20:20

## 2017-10-12 RX ADMIN — FAMOTIDINE 20 MG: 10 INJECTION, SOLUTION INTRAVENOUS at 11:03

## 2017-10-12 NOTE — PROGRESS NOTES
TRANSFER - IN REPORT:    Verbal report received from Norberto Barry RN(name) on Len Ruff  being received from ICU(unit) for routine progression of care      Report consisted of patients Situation, Background, Assessment and   Recommendations(SBAR). Information from the following report(s) SBAR, Kardex, Intake/Output and MAR was reviewed with the receiving nurse. Opportunity for questions and clarification was provided. Assessment completed upon patients arrival to unit and care assumed.

## 2017-10-12 NOTE — PROGRESS NOTES
0730- Bedside and Verbal shift change report given to Rey Cheung (oncoming nurse) by Darinel Merchant RN (offgoing nurse). Report included the following information SBAR, Kardex, Intake/Output, MAR, Recent Results and Cardiac Rhythm NSR.   1900- TRANSFER - OUT REPORT:    Verbal report given to [facility] RN(name) on Brunilda Temple  being transferred to [facility](unit) for routine progression of care       Report consisted of patients Situation, Background, Assessment and   Recommendations(SBAR). Information from the following report(s) SBAR, Kardex, OR Summary, Procedure Summary, Intake/Output, MAR, Recent Results and Cardiac Rhythm NSR was reviewed with the receiving nurse. Lines:   PICC Triple Lumen 80/36/47 Right;Basilic (Active)   Central Line Being Utilized Yes 10/12/2017  4:00 AM   Criteria for Appropriate Use Hemodynamically unstable, requiring monitoring lines, vasopressors, or volume resuscitation 10/12/2017  4:00 AM   Site Assessment Clean, dry, & intact 10/12/2017  4:00 AM   Phlebitis Assessment 0 10/12/2017  4:00 AM   Infiltration Assessment 0 10/12/2017  4:00 AM   Arm Circumference (cm) 32 cm 10/11/2017  2:06 PM   Date of Last Dressing Change 10/11/17 10/12/2017  4:00 AM   Dressing Status Clean, dry, & intact 10/12/2017  4:00 AM   External Catheter Length (cm) 0 centimeters 10/11/2017  2:06 PM   Dressing Type Disk with Chlorhexadine gluconate (CHG); Transparent;Tape 10/12/2017  4:00 AM   Action Taken Open ports on tubing capped 10/12/2017  4:00 AM   Hub Color/Line Status Infusing; White 10/12/2017  4:00 AM   Positive Blood Return (Site #1) Yes 10/12/2017  4:00 AM   Hub Color/Line Status Darlene Priscilla; Infusing 10/12/2017  4:00 AM   Positive Blood Return (Site #2) Yes 10/12/2017  4:00 AM   Hub Color/Line Status Red; Infusing 10/12/2017  4:00 AM   Positive Blood Return (Site #3) Yes 10/12/2017  4:00 AM   Alcohol Cap Used Yes 10/12/2017  4:00 AM       Peripheral IV 10/09/17 Left Arm (Active)   Site Assessment Clean, dry, & intact 10/12/2017  4:00 AM   Phlebitis Assessment 0 10/12/2017  4:00 AM   Infiltration Assessment 0 10/12/2017  4:00 AM   Dressing Status Clean, dry, & intact 10/12/2017  4:00 AM   Dressing Type Transparent;Tape 10/12/2017  4:00 AM   Hub Color/Line Status Blue;Capped 10/12/2017  4:00 AM   Action Taken Open ports on tubing capped 10/12/2017  4:00 AM   Alcohol Cap Used Yes 10/12/2017  4:00 AM        Opportunity for questions and clarification was provided.       Patient transported with:   Monitor  Registered Nurse  Tech

## 2017-10-12 NOTE — PROGRESS NOTES
0630: Noted serousanguinous drainage from midline abdominal incision. 4 x 4 and medipore tape applied to site. Shift Summary: Patient had an uneventful shift. VSS. Alert and oriented x4. Follows commands. Remains in NSR. Weaning cardizem drip. Amiodarone drip infusing. Minimal pain. CAM negative.

## 2017-10-12 NOTE — ROUTINE PROCESS
Bedside and Verbal shift change report given to Elvis Dee (oncoming nurse) by Gregorio Alicia (offgoing nurse). Report included the following information SBAR, Kardex, Intake/Output, MAR, Recent Results, Cardiac Rhythm NSR and Alarm Parameters .

## 2017-10-12 NOTE — PROGRESS NOTES
[facility] General Surgery    POD #6 s/p pylorus preserving whipple for distal cholangiocarcinoma. Subjective     Had a better day yesterday. Back in sinus rhythm this morning. Had a large BM yesterday and another this morning. No n/v. Patient afebrile yesterday. Pain well controlled. Started having some drainage from his midline incision.       Objective     Patient Vitals for the past 24 hrs:   Temp Pulse Resp BP SpO2   10/12/17 0700 - 92 23 102/50 99 %   10/12/17 0600 - 78 17 116/80 98 %   10/12/17 0500 - 82 10 115/68 94 %   10/12/17 0400 97.7 °F (36.5 °C) 84 17 124/59 93 %   10/12/17 0300 - 92 13 121/67 95 %   10/12/17 0200 - 90 20 137/75 99 %   10/12/17 0100 - 89 13 121/63 98 %   10/12/17 0047 - - - - 99 %   10/12/17 0000 98.1 °F (36.7 °C) 90 11 109/72 92 %   10/11/17 2300 - 91 15 110/61 95 %   10/11/17 2200 - 92 18 121/45 96 %   10/11/17 2100 - 92 14 126/66 95 %   10/11/17 2000 97.7 °F (36.5 °C) (!) 104 19 121/66 96 %   10/11/17 1900 - 93 20 102/46 94 %   10/11/17 1800 - 95 11 - 98 %   10/11/17 1700 - 95 15 118/53 96 %   10/11/17 1600 97.7 °F (36.5 °C) 87 10 (!) 122/98 98 %   10/11/17 1500 - (!) 102 21 107/58 95 %   10/11/17 1400 - 83 13 121/69 94 %   10/11/17 1300 - 100 18 125/80 -   10/11/17 1200 97.7 °F (36.5 °C) 84 21 116/65 98 %   10/11/17 1100 - 99 11 126/88 95 %   10/11/17 1000 - 92 14 117/71 -   10/11/17 0900 - (!) 109 - (!) 136/93 95 %         Date 10/11/17 0700 - 10/12/17 0659 10/12/17 0700 - 10/13/17 0659   Shift 0700-1859 1900-0659 24 Hour Total 0700-1859 1900-0659 24 Hour Total   I  N  T  A  K  E   I.V.  (mL/kg/hr) 1336.7  (1.1) 1400.7  (1.3) 2737.4  (1.2) 113  113      I.V.  125 125 15  15      Cardizem Volume 146.7 75.7 222.4 3  3      Amiodarone Volume 240 200 440 20  20      Volume (0.9% sodium chloride infusion)  75  75      Volume (meropenem (MERREM) 500 mg in 0.9% sodium chloride (MBP/ADV) 50 mL) 50 100 150 Shift Total  (mL/kg) 1336.7  (13.1) 1400.7  (15) 2737.4  (29.3) 113  (1.2)  113  (1.2)   O  U  T  P  U  T   Urine  (mL/kg/hr) 1500  (1.2) 1800  (1.6) 3300  (1.5)         Urine Voided 1500 1800 3300         Urine Occurrence(s) 2 x 5 x 7 x 1 x  1 x    Drains 60 180 240 50  50      Output (ml) (Chi Wu #1 10/06/17 Right; Lower Abdomen) 40 170 210 50  50      Output (ml) (Chi Wu #2 10/06/17 Right; Lower Abdomen) 20 10 30       Stool            Stool Occurrence(s)    1 x  1 x    Shift Total  (mL/kg) 1560  (15.3) 1980  (21.2) 3540  (37.9) 50  (0.5)  50  (0.5)   NET -223.3 -579.3 -802.6 63  63   Weight (kg) 101.9 93.3 93.3 93.3 93.3 93.3       PE  GEN - Awake, alert, communicating appropriately. Appears comfortable. Pulm - CTAB  CV - RRR, rate 70's. Abd - soft, less distended.  + BS. TTP mostly around drains in RLQ. Incision with some thin tan drainage at middle and lower portions of incision. Ext - warm, well perfused, no significant edema.       Labs  Recent Results (from the past 24 hour(s))   GLUCOSE, POC    Collection Time: 10/11/17 11:49 AM   Result Value Ref Range    Glucose (POC) 146 (H) 65 - 100 mg/dL    Performed by Ewa Jaime    GLUCOSE, POC    Collection Time: 10/11/17  5:50 PM   Result Value Ref Range    Glucose (POC) 143 (H) 65 - 100 mg/dL    Performed by Ewa Jaime    GLUCOSE, POC    Collection Time: 10/12/17 12:26 AM   Result Value Ref Range    Glucose (POC) 132 (H) 65 - 100 mg/dL    Performed by Manisha Rutherford    CBC WITH AUTOMATED DIFF    Collection Time: 10/12/17  4:44 AM   Result Value Ref Range    WBC 15.7 (H) 4.1 - 11.1 K/uL    RBC 3.10 (L) 4.10 - 5.70 M/uL    HGB 9.2 (L) 12.1 - 17.0 g/dL    HCT 27.3 (L) 36.6 - 50.3 %    MCV 88.1 80.0 - 99.0 FL    MCH 29.7 26.0 - 34.0 PG    MCHC 33.7 30.0 - 36.5 g/dL    RDW 14.5 11.5 - 14.5 %    PLATELET 290 725 - 521 K/uL    NEUTROPHILS 84 (H) 32 - 75 %    BAND NEUTROPHILS 5 0 - 6 %    LYMPHOCYTES 2 (L) 12 - 49 %    MONOCYTES 5 5 - 13 %    EOSINOPHILS 2 0 - 7 %    BASOPHILS 0 0 - 1 %    METAMYELOCYTES 2 (H) 0 %    ABS. NEUTROPHILS 14.0 (H) 1.8 - 8.0 K/UL    ABS. LYMPHOCYTES 0.3 (L) 0.8 - 3.5 K/UL    ABS. MONOCYTES 0.8 0.0 - 1.0 K/UL    ABS. EOSINOPHILS 0.3 0.0 - 0.4 K/UL    ABS. BASOPHILS 0.0 0.0 - 0.1 K/UL    DF MANUAL      RBC COMMENTS ANISOCYTOSIS  1+        RBC COMMENTS HYPOCHROMIA  1+       METABOLIC PANEL, COMPREHENSIVE    Collection Time: 10/12/17  4:44 AM   Result Value Ref Range    Sodium 130 (L) 136 - 145 mmol/L    Potassium 3.0 (L) 3.5 - 5.1 mmol/L    Chloride 92 (L) 97 - 108 mmol/L    CO2 30 21 - 32 mmol/L    Anion gap 8 5 - 15 mmol/L    Glucose 137 (H) 65 - 100 mg/dL    BUN 9 6 - 20 MG/DL    Creatinine 0.57 (L) 0.70 - 1.30 MG/DL    BUN/Creatinine ratio 16 12 - 20      GFR est AA >60 >60 ml/min/1.73m2    GFR est non-AA >60 >60 ml/min/1.73m2    Calcium 7.5 (L) 8.5 - 10.1 MG/DL    Bilirubin, total 0.7 0.2 - 1.0 MG/DL    ALT (SGPT) 28 12 - 78 U/L    AST (SGOT) 12 (L) 15 - 37 U/L    Alk. phosphatase 74 45 - 117 U/L    Protein, total 4.6 (L) 6.4 - 8.2 g/dL    Albumin 1.8 (L) 3.5 - 5.0 g/dL    Globulin 2.8 2.0 - 4.0 g/dL    A-G Ratio 0.6 (L) 1.1 - 2.2     MAGNESIUM    Collection Time: 10/12/17  4:44 AM   Result Value Ref Range    Magnesium 1.9 1.6 - 2.4 mg/dL   PHOSPHORUS    Collection Time: 10/12/17  4:44 AM   Result Value Ref Range    Phosphorus 2.4 (L) 2.6 - 4.7 MG/DL   GLUCOSE, POC    Collection Time: 10/12/17  6:42 AM   Result Value Ref Range    Glucose (POC) 129 (H) 65 - 100 mg/dL    Performed by Tatiana. 73 is a 65 y. o.yr old male s/p whipple for distal cholangiocarcinoma. Pathology shows pT3N1 cholangiocarcinoma. Plan     N: Decrease basal on PCA today. Will plan to transition to PO pain meds tomorrow if tolerates diet today. CV: [address] Cardiology input. On Amiodarone, cardizem and digoxin. Appears to be back in sinus rhythm. P: Continue pulmonary toilet, IS.   Will order another dose of diuresis today. GI: Advance to GI lite diet today. Drains serosanguinous. CT ordered for tomorrow to reassess fluid collection. Will leave current drains in place to bulb suction. On pepcid for GI proph. ISS for any hyperglycemia. R: Hyponatremic, hypochloremic, hypokalemic and hypophosphatemia. Will replace K, Cl, Phos today. IVF on low rate NS. Giving additional diuresis today. H/ID: WBC stable today. Afebrile. Hgb stable. Will keep on meropenem. MSK: DVT proph with lovenox. Continue SCD's. Dispo: Can transfer out of ICU once off cardiac gtt's.       Quinten Smith MD  10/12/2017  8:50 AM

## 2017-10-12 NOTE — PROGRESS NOTES
Problem: Falls - Risk of  Goal: *Absence of Falls  Document Rosalia Fall Risk and appropriate interventions in the flowsheet.    Outcome: Progressing Towards Goal  Fall Risk Interventions:  Mobility Interventions: Assess mobility with egress test, Communicate number of staff needed for ambulation/transfer, Patient to call before getting OOB, PT Consult for assist device competence, PT Consult for mobility concerns           Medication Interventions: Assess postural VS orthostatic hypotension, Evaluate medications/consider consulting pharmacy, Patient to call before getting OOB, Teach patient to arise slowly, Utilize gait belt for transfers/ambulation     Elimination Interventions: Call light in reach, Elevated toilet seat, Patient to call for help with toileting needs

## 2017-10-12 NOTE — PROGRESS NOTES
Clinical Pharmacy Note: Stress Ulcer Prophylaxis Protocol (ICU patients)    Please note that stress ulcer prophylaxis is NOT indicated for patients outside of the ICU. Car Yang has an order for famotidine for stress ulcer prophylaxis that has been automatically discontinued per the [facility] Stress Ulcer Prophylaxis Protocol for eligible patients based on the following criteria:     Patient is tolerating enteral nutrition (tube feeds, full liquids, mechanical soft/regular diet)   Has resolution of the indications / risk factors (i.e., no longer mechanically ventilated, resolution of coagulopathy, no longer requiring vasopressor therapy, steroids dose is less than 250 mg-equivalents of hydrocortisone)  OR   Has transfer orders out of the ICU  o This applies to patients still receiving high doses of steroids as there is no data to support routinely using SUP in patients receiving steroids outside of the critical care environment  o Exceptions: patients with an intracranial hemorrhage or thermal injury to >35% of their body surface are    Of note, the following populations are excluded from this protocol:   Continuing a proton pump inhibitor (PPI) from prior to admission   Receiving a PPI for an indication other than SUP  o GI bleeding, symptomatic GERD, gastritis, PUD, proximal GI fistula, erosive esophagitis, Padgetts esophagitis, hypersecretory conditions (e.g., Zollinger-Hart Syndrome), H.pylori eradication, NSAID-associated ulcer (prevention or treatment)   Cardiac surgery patients   Receiving a PPI to prevent GI bleeding while on dual antiplatelet therapy   Receiving a PPI for prevention of marginal ulcers after gastric bypass surgery (George-en-Y, sleeve gastrectomy, etc)    Please call with any questions.

## 2017-10-12 NOTE — PROGRESS NOTES
[facility]  Pulmonary, Critical Care, and Sleep Medicine  Name: Heidi Stein MRN: [id number]   : 1955 Hospital: [facility]    Date: 10/12/2017          IMPRESSION:   1. 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 PAF/RVR- now in NSR on amio- switch to PO  3. AMS- suspect metabolic toxic-better  4. Hyponatremia- suspect hypervolemic hyponatremia  5. Right abd collection  6. Sjogren's  7. HTN      Clinical Overview and Plan:   · xfer to [facility]  · D/w general surgery  · Follow up CT abd tomorrow  · SUP  · DVT proph  · Pulm toilet           ABG No results for input(s): PHI, PO2I, PCO2I in the last 72 hours. Subjective/Interval History:   I have reviewed the flowsheet and previous days notes. Pertinent items are noted in HPI. Pt alert sitting in chair. Follows commands. Denies Cp or SOB. Objective:     Mode Rate Tidal Volume Pressure FiO2 PEEP                    Vital Signs:     TMAX(24)      Intake/Output:   Last shift:         Last 3 shifts: 10/12 0701 - 10/12 1900  In: -   Out: 70 [Drains:70]RRIOLAST3    Intake/Output Summary (Last 24 hours) at 10/12/17 5874  Last data filed at 10/12/17 0800   Gross per 24 hour   Intake          2526.38 ml   Output             3645 ml   Net         -1118.62 ml     EXAM:   HEENT:  PERRL, EOMI, no alar flaring or epistaxis, oral mucosa moist without cyanosis, NECK:  no jugular vein distention, no retractions, no thyromegaly or masses, LUNGS: decreased breath sounds billaterally and with rhonchi , HEART:  Regular rate and rhythm with no MGR; no edema is present, ABDOMEN:  soft with no tenderness, bowel sounds present, EXTREMITIES:  warm with no cyanosis and SKIN:  no jaundice or ecchymosis        Data    I have personally reviewed data, flowsheets for the last 24 hours.         Labs:  Recent Labs      10/12/17   0444  10/11/17   0514  10/10/17   0329   WBC  15.7*  16.1*  12.7*   HGB  9.2*  10.2*  10.0*   HCT 27.3*  29.8*  29.0*   PLT  178  176  140*     Recent Labs      10/12/17   0444  10/11/17   0514  10/10/17   0329   NA  130*  129*  126*  127*   K  3.0*  3.4*  3.2*  3.3*   CL  92*  93*  91*  93*   CO2  30  27  26  26   GLU  137*  147*  244*  215*   BUN  9  11  10  10   CREA  0.57*  0.67*  0.79  0.75   CA  7.5*  7.6*  7.5*  7.5*   MG  1.9  2.1  1.7   PHOS  2.4*  2.5*  1.8*   ALB  1.8*  1.8*  1.9*   SGOT  12*  17  15   ALT  28  37  52       Naomy Manley MD  [facility]

## 2017-10-12 NOTE — PROGRESS NOTES
Cardiology Progress Note    Pt sitting in chair and states he is feeling better. No fever. Rate down but not a regular rhythm     Imp:  S/P Whipple for cholangiocarcinoma-no fever, but WBC up  Pafib  Hyponatremic/hypokalemic  HTN  Sjogren's    Rec:  EKG-pending  Will convert to po amio and stop diltiazem for now  Correct K+    Blood pressure 102/50, pulse 92, temperature 97.8 °F (36.6 °C), resp. rate 23, height 5' 8\" (1.727 m), weight 93.3 kg (205 lb 11 oz), SpO2 99 %.   Lungs clear  Cor irreg  No edema    K 3.0; Cr 0.57: Na 130    Silvana Viera MD

## 2017-10-13 ENCOUNTER — APPOINTMENT (OUTPATIENT)
Dept: CT IMAGING | Age: 62
DRG: 406 | End: 2017-10-13
Attending: SURGERY
Payer: COMMERCIAL

## 2017-10-13 LAB
ALBUMIN SERPL-MCNC: 1.8 G/DL (ref 3.5–5)
ALBUMIN/GLOB SERPL: 0.7 {RATIO} (ref 1.1–2.2)
ALP SERPL-CCNC: 75 U/L (ref 45–117)
ALT SERPL-CCNC: 26 U/L (ref 12–78)
ANION GAP SERPL CALC-SCNC: 8 MMOL/L (ref 5–15)
AST SERPL-CCNC: 14 U/L (ref 15–37)
BASOPHILS # BLD: 0 K/UL (ref 0–0.1)
BASOPHILS NFR BLD: 0 % (ref 0–1)
BILIRUB SERPL-MCNC: 0.6 MG/DL (ref 0.2–1)
BUN SERPL-MCNC: 8 MG/DL (ref 6–20)
BUN/CREAT SERPL: 17 (ref 12–20)
CALCIUM SERPL-MCNC: 7.6 MG/DL (ref 8.5–10.1)
CHLORIDE SERPL-SCNC: 98 MMOL/L (ref 97–108)
CO2 SERPL-SCNC: 29 MMOL/L (ref 21–32)
CREAT SERPL-MCNC: 0.46 MG/DL (ref 0.7–1.3)
DIFFERENTIAL METHOD BLD: ABNORMAL
EOSINOPHIL # BLD: 0.6 K/UL (ref 0–0.4)
EOSINOPHIL NFR BLD: 4 % (ref 0–7)
ERYTHROCYTE [DISTWIDTH] IN BLOOD BY AUTOMATED COUNT: 14.3 % (ref 11.5–14.5)
GLOBULIN SER CALC-MCNC: 2.7 G/DL (ref 2–4)
GLUCOSE BLD STRIP.AUTO-MCNC: 113 MG/DL (ref 65–100)
GLUCOSE BLD STRIP.AUTO-MCNC: 127 MG/DL (ref 65–100)
GLUCOSE BLD STRIP.AUTO-MCNC: 139 MG/DL (ref 65–100)
GLUCOSE BLD STRIP.AUTO-MCNC: 149 MG/DL (ref 65–100)
GLUCOSE SERPL-MCNC: 137 MG/DL (ref 65–100)
HCT VFR BLD AUTO: 26.8 % (ref 36.6–50.3)
HGB BLD-MCNC: 9.3 G/DL (ref 12.1–17)
LYMPHOCYTES # BLD: 0.7 K/UL (ref 0.8–3.5)
LYMPHOCYTES NFR BLD: 5 % (ref 12–49)
MAGNESIUM SERPL-MCNC: 2.2 MG/DL (ref 1.6–2.4)
MCH RBC QN AUTO: 30.7 PG (ref 26–34)
MCHC RBC AUTO-ENTMCNC: 34.7 G/DL (ref 30–36.5)
MCV RBC AUTO: 88.4 FL (ref 80–99)
METAMYELOCYTES NFR BLD MANUAL: 3 %
MONOCYTES # BLD: 0.6 K/UL (ref 0–1)
MONOCYTES NFR BLD: 4 % (ref 5–13)
MYELOCYTES NFR BLD MANUAL: 1 %
NEUTS BAND NFR BLD MANUAL: 7 % (ref 0–6)
NEUTS SEG # BLD: 12.2 K/UL (ref 1.8–8)
NEUTS SEG NFR BLD: 76 % (ref 32–75)
PHOSPHATE SERPL-MCNC: 3.1 MG/DL (ref 2.6–4.7)
PLATELET # BLD AUTO: 218 K/UL (ref 150–400)
POTASSIUM SERPL-SCNC: 3.3 MMOL/L (ref 3.5–5.1)
PROT SERPL-MCNC: 4.5 G/DL (ref 6.4–8.2)
RBC # BLD AUTO: 3.03 M/UL (ref 4.1–5.7)
RBC MORPH BLD: ABNORMAL
SERVICE CMNT-IMP: ABNORMAL
SODIUM SERPL-SCNC: 135 MMOL/L (ref 136–145)
WBC # BLD AUTO: 14.7 K/UL (ref 4.1–11.1)
WBC MORPH BLD: ABNORMAL

## 2017-10-13 PROCEDURE — 74011250636 HC RX REV CODE- 250/636: Performed by: SURGERY

## 2017-10-13 PROCEDURE — 74011250636 HC RX REV CODE- 250/636: Performed by: INTERNAL MEDICINE

## 2017-10-13 PROCEDURE — 97116 GAIT TRAINING THERAPY: CPT

## 2017-10-13 PROCEDURE — 74011000258 HC RX REV CODE- 258: Performed by: SURGERY

## 2017-10-13 PROCEDURE — 65660000000 HC RM CCU STEPDOWN

## 2017-10-13 PROCEDURE — 74011250637 HC RX REV CODE- 250/637: Performed by: INTERNAL MEDICINE

## 2017-10-13 PROCEDURE — 84100 ASSAY OF PHOSPHORUS: CPT | Performed by: SURGERY

## 2017-10-13 PROCEDURE — 83735 ASSAY OF MAGNESIUM: CPT | Performed by: SURGERY

## 2017-10-13 PROCEDURE — 74011636320 HC RX REV CODE- 636/320: Performed by: SURGERY

## 2017-10-13 PROCEDURE — 93005 ELECTROCARDIOGRAM TRACING: CPT

## 2017-10-13 PROCEDURE — 36415 COLL VENOUS BLD VENIPUNCTURE: CPT | Performed by: SURGERY

## 2017-10-13 PROCEDURE — 74011250637 HC RX REV CODE- 250/637: Performed by: SURGERY

## 2017-10-13 PROCEDURE — 74177 CT ABD & PELVIS W/CONTRAST: CPT

## 2017-10-13 PROCEDURE — 36592 COLLECT BLOOD FROM PICC: CPT

## 2017-10-13 PROCEDURE — 80053 COMPREHEN METABOLIC PANEL: CPT | Performed by: SURGERY

## 2017-10-13 PROCEDURE — 82962 GLUCOSE BLOOD TEST: CPT

## 2017-10-13 PROCEDURE — 85025 COMPLETE CBC W/AUTO DIFF WBC: CPT | Performed by: SURGERY

## 2017-10-13 RX ORDER — SODIUM CHLORIDE 0.9 % (FLUSH) 0.9 %
10 SYRINGE (ML) INJECTION
Status: COMPLETED | OUTPATIENT
Start: 2017-10-13 | End: 2017-10-13

## 2017-10-13 RX ORDER — SIMETHICONE 80 MG
80 TABLET,CHEWABLE ORAL
Status: DISCONTINUED | OUTPATIENT
Start: 2017-10-13 | End: 2017-10-16 | Stop reason: HOSPADM

## 2017-10-13 RX ORDER — HYDROCODONE BITARTRATE AND ACETAMINOPHEN 5; 325 MG/1; MG/1
1 TABLET ORAL
Status: DISCONTINUED | OUTPATIENT
Start: 2017-10-13 | End: 2017-10-16 | Stop reason: HOSPADM

## 2017-10-13 RX ORDER — POTASSIUM CHLORIDE 750 MG/1
20 TABLET, FILM COATED, EXTENDED RELEASE ORAL 2 TIMES DAILY
Status: COMPLETED | OUTPATIENT
Start: 2017-10-13 | End: 2017-10-13

## 2017-10-13 RX ORDER — HYDROMORPHONE HYDROCHLORIDE 1 MG/ML
1 INJECTION, SOLUTION INTRAMUSCULAR; INTRAVENOUS; SUBCUTANEOUS
Status: DISCONTINUED | OUTPATIENT
Start: 2017-10-13 | End: 2017-10-16 | Stop reason: HOSPADM

## 2017-10-13 RX ORDER — FAMOTIDINE 20 MG/1
20 TABLET, FILM COATED ORAL 2 TIMES DAILY
Status: DISCONTINUED | OUTPATIENT
Start: 2017-10-13 | End: 2017-10-16 | Stop reason: HOSPADM

## 2017-10-13 RX ORDER — LISINOPRIL 5 MG/1
5 TABLET ORAL DAILY
Status: DISCONTINUED | OUTPATIENT
Start: 2017-10-13 | End: 2017-10-16 | Stop reason: HOSPADM

## 2017-10-13 RX ORDER — HYDROCODONE BITARTRATE AND ACETAMINOPHEN 5; 325 MG/1; MG/1
2 TABLET ORAL
Status: DISCONTINUED | OUTPATIENT
Start: 2017-10-13 | End: 2017-10-16 | Stop reason: HOSPADM

## 2017-10-13 RX ORDER — FUROSEMIDE 10 MG/ML
20 INJECTION INTRAMUSCULAR; INTRAVENOUS ONCE
Status: COMPLETED | OUTPATIENT
Start: 2017-10-13 | End: 2017-10-13

## 2017-10-13 RX ADMIN — IOPAMIDOL 100 ML: 755 INJECTION, SOLUTION INTRAVENOUS at 10:17

## 2017-10-13 RX ADMIN — ACETAMINOPHEN 650 MG: 325 TABLET, FILM COATED ORAL at 00:23

## 2017-10-13 RX ADMIN — Medication 10 ML: at 06:05

## 2017-10-13 RX ADMIN — HYDROCODONE BITARTRATE AND ACETAMINOPHEN 2 TABLET: 5; 325 TABLET ORAL at 16:54

## 2017-10-13 RX ADMIN — SODIUM CHLORIDE 75 ML/HR: 900 INJECTION, SOLUTION INTRAVENOUS at 01:47

## 2017-10-13 RX ADMIN — LISINOPRIL 5 MG: 5 TABLET ORAL at 18:55

## 2017-10-13 RX ADMIN — Medication 10 ML: at 21:04

## 2017-10-13 RX ADMIN — GABAPENTIN 900 MG: 300 CAPSULE ORAL at 09:07

## 2017-10-13 RX ADMIN — DOCUSATE SODIUM 100 MG: 100 CAPSULE, LIQUID FILLED ORAL at 09:08

## 2017-10-13 RX ADMIN — SIMETHICONE CHEW TAB 80 MG 80 MG: 80 TABLET ORAL at 17:47

## 2017-10-13 RX ADMIN — Medication 10 ML: at 09:08

## 2017-10-13 RX ADMIN — HYDROCODONE BITARTRATE AND ACETAMINOPHEN 2 TABLET: 5; 325 TABLET ORAL at 20:58

## 2017-10-13 RX ADMIN — Medication 10 ML: at 15:43

## 2017-10-13 RX ADMIN — POTASSIUM CHLORIDE 20 MEQ: 750 TABLET, FILM COATED, EXTENDED RELEASE ORAL at 17:49

## 2017-10-13 RX ADMIN — POTASSIUM CHLORIDE 20 MEQ: 750 TABLET, FILM COATED, EXTENDED RELEASE ORAL at 09:08

## 2017-10-13 RX ADMIN — ENOXAPARIN SODIUM 40 MG: 40 INJECTION SUBCUTANEOUS at 09:07

## 2017-10-13 RX ADMIN — SIMETHICONE CHEW TAB 80 MG 80 MG: 80 TABLET ORAL at 20:58

## 2017-10-13 RX ADMIN — AMIODARONE HYDROCHLORIDE 200 MG: 200 TABLET ORAL at 17:48

## 2017-10-13 RX ADMIN — MEROPENEM 500 MG: 500 INJECTION, POWDER, FOR SOLUTION INTRAVENOUS at 06:04

## 2017-10-13 RX ADMIN — Medication 10 ML: at 15:42

## 2017-10-13 RX ADMIN — ACETAMINOPHEN 650 MG: 325 TABLET, FILM COATED ORAL at 11:57

## 2017-10-13 RX ADMIN — MEROPENEM 500 MG: 500 INJECTION, POWDER, FOR SOLUTION INTRAVENOUS at 17:53

## 2017-10-13 RX ADMIN — MEROPENEM 500 MG: 500 INJECTION, POWDER, FOR SOLUTION INTRAVENOUS at 00:23

## 2017-10-13 RX ADMIN — MEROPENEM 500 MG: 500 INJECTION, POWDER, FOR SOLUTION INTRAVENOUS at 11:57

## 2017-10-13 RX ADMIN — GABAPENTIN 900 MG: 300 CAPSULE ORAL at 17:48

## 2017-10-13 RX ADMIN — DOCUSATE SODIUM 100 MG: 100 CAPSULE, LIQUID FILLED ORAL at 17:48

## 2017-10-13 RX ADMIN — FUROSEMIDE 20 MG: 10 INJECTION, SOLUTION INTRAMUSCULAR; INTRAVENOUS at 15:41

## 2017-10-13 RX ADMIN — IOHEXOL 50 ML: 240 INJECTION, SOLUTION INTRATHECAL; INTRAVASCULAR; INTRAVENOUS; ORAL at 10:18

## 2017-10-13 RX ADMIN — FAMOTIDINE 20 MG: 20 TABLET ORAL at 18:55

## 2017-10-13 RX ADMIN — AMIODARONE HYDROCHLORIDE 200 MG: 200 TABLET ORAL at 09:08

## 2017-10-13 RX ADMIN — SODIUM CHLORIDE 100 ML: 900 INJECTION, SOLUTION INTRAVENOUS at 10:18

## 2017-10-13 NOTE — PROGRESS NOTES
Problem: Falls - Risk of  Goal: *Absence of Falls  Document Rosalia Fall Risk and appropriate interventions in the flowsheet.    Outcome: Progressing Towards Goal  Fall Risk Interventions:  Mobility Interventions: Patient to call before getting OOB           Medication Interventions: Patient to call before getting OOB     Elimination Interventions: Call light in reach

## 2017-10-13 NOTE — PROGRESS NOTES
Bedside and Verbal shift change report given to [employer] (oncoming nurse) by BANDAR Keller RN (offgoing nurse). Report given with SBAR, Kardex, Intake/Output, MAR and Recent Results.

## 2017-10-13 NOTE — PROGRESS NOTES
NUTRITION   RD Screen      Pt seen for:      []  MST for unsure amt of wt loss   []   MD Consult    []        Supplements  []   PO intake check   []        Food Allergies  []   Food Preferences/tolerances    [x]        Rescreen  [x]   Education    []        Diet order clarification []   Other   []  LOS                          Nutrition Prescription:     No changes or new recommendations at this time  Encourage adequate intake of meals and supplements    Assessment:   Information obtained from:   Patient and patient's sister     Pt admitted for planned whipple procedure following dx of adenocarcinoma. Wt hx reviewed and found wt has been around 200 lbs. Pt is asleep and son at bedside. Pt is advancing well after surgery. Completed nutrition-related diet instruction regarding potential and expected diet changes. Diet has been advanced to GI lite. Pt is anxious to eat and feeling hungry. No creon has been ordered. Pt states surgery does not feel pt will have much trouble transitioning back to normal PO. Cultural, Voodoo and ethnic food preferences:   [x]  None   []  Identified and addressed    Diet:  Full Liquids (begin at lunch today)    PO Intake: [x]   Good     []     Fair      []      Poor     No data found. 10/9 b'fast: clears 100% of tray    Wt Readings from Last 5 Encounters:   10/13/17 97.5 kg (215 lb)   09/29/17 88 kg (194 lb)   09/21/17 90.7 kg (200 lb)   09/05/17 88.5 kg (195 lb)   08/31/17 100.2 kg (220 lb 12.8 oz)   ]  Wt Readings from Last 10 Encounters:   10/13/17 97.5 kg (215 lb)   09/29/17 88 kg (194 lb)   09/21/17 90.7 kg (200 lb)   09/05/17 88.5 kg (195 lb)   08/31/17 100.2 kg (220 lb 12.8 oz)   08/18/17 90.5 kg (199 lb 9 oz)   07/28/17 89.1 kg (196 lb 6.9 oz)   07/25/17 89 kg (196 lb 3.4 oz)   ]    Body mass index is 32.69 kg/(m^2).     Skin: surgical incision  BM:  PTA  ABD: distended, semi-soft, tender, active    Estimated Daily Nutrition Requirements:  Kcals/day: 1442 Kcals/day Protein: 128 g (1.3 g/kg of current wt)  Fluid: 2470 ml (25 mL/kg of current wt)     Based On: Kcal/kg - specify (Comment) (25 kcal/kg of current wt)  Weight Used: Actual wt (98.7 kg)    Weight Changes:   [x]   Loss (unsure, does not appear significant)  []   Gain  []   Stable    Nutrition Problems Identified  [x]     None  []     Specified food preferences   []     Dislikes supplements              []     Allergies  []     Difficulty chewing     []     Dentition   []     Nausea/Vomiting  []    Constipation  []    Diarrhea    Nutrition Diagnosis:      Nutrition - related knowledge deficit related to diet parameters s/p Whipple procedure as evidence by no previous need or exposure to the information.      Intervention:   []    Obtained/adjusted food preferences/tolerances and/or snacks options   []    Dislikes supplements will try a substitution   []    Modify diet for food allergies  []    Adjust texture due to difficulty chewing   []    Encourage Fresh Fruit, Activia yogurt, fluid   [x]    Educated patient  [x]    Rescreen per screening protocol  []    Add Supplements    Goal: n/a    Monitoring/Evaluation:   Education & Discharge Needs  [] Nutrition related discharge needs addressed:   [] Supplements (on d/c instruction &/or coupons provided)    [x] Education-completed whipple diet instruction, handouts discussed and given to patient  [] No nutrition related discharge needs at this time    Rescreen: []  At Nutrition Risk          [x]  Not at Nutrition Risk, rescreen per screening protocol    Marcelino Oseguera MS, RD, CDE

## 2017-10-13 NOTE — PROGRESS NOTES
Cardiology Progress Note    Pt reports no palpitations or shortness of breath. Imp:  S/P Whipple for cholangiocarcinoma-no fever, but WBC up  Pafib  Hyponatremic/hypokalemic  HTN  Sjogren's     Rec: Will continue the current regimen with plans to decrease amiodarone just prior to discharge and ideally get him off of it within a couple of months. BP trending up.  Will start low dose lisinopril which will help with potassium (usually on ramipril but not available)    Noe Olmos MD

## 2017-10-13 NOTE — PROGRESS NOTES
Problem: Mobility Impaired (Adult and Pediatric)  Goal: *Acute Goals and Plan of Care (Insert Text)  Physical Therapy Goals  Revisited 10/13/2017, carryover goals, goals remain appropriate. Physical Therapy Goals  Initiated 10/7/2017  1. Patient will move from supine to sit and sit to supine in bed with modified independence within 7 day(s). 2. Patient will transfer from bed to chair and chair to bed with modified independence using the least restrictive device within 7 day(s). 3. Patient will perform sit to stand with modified independence within 7 day(s). 4. Patient will ambulate with modified independence for 200 feet with the least restrictive device within 7 day(s). 5. Patient will ascend/descend 6 stairs with use of handrail(s) with modified independence within 7 day(s). PHYSICAL THERAPY TREATMENT: WEEKLY REASSESSMENT  Patient: Rasheed Cross (26 y.o. male)  Date: 10/13/2017  Diagnosis: CANCER   Cholangiocarcinoma determined by biopsy of biliary tract ([address] <principal problem not specified>  Procedure(s) (LRB):  WHIPPLE PROCEDURE  (N/A) 7 Days Post-Op  Precautions: Fall , contact 1      ASSESSMENT:  Pt received in bed initially declining PT but easily agreeable with coaxing. Using bed rail to roll and splinting with cardiac bear, pt moved to sitting at EOB with stand by assist. He stood with stand by assist and with support of iv pole amb 80 feet with stand by assist, gait slow and steady. Post session he remained up to the chair. HR at rest was 82 and during activity highest reading was  bpm. For the weekend I recommend that pt is up to the chair for all meals and amb with staff at least 3-4 times daily and PT to return after the weekend. Patient's progression toward goals since last assessment: gains made in all areas, goals not met but they remain appropriate and were carried over. PLAN:  Goals have been updated based on progression since last assessment.   Patient continues to benefit from skilled intervention to address the above impairments. Continue to follow the patient 5 times a week to address goals. Planned Interventions:  [X]              Bed Mobility Training             [ ]       Neuromuscular Re-Education  [X]              Transfer Training                   [ ]       Orthotic/Prosthetic Training  [X]              Gait Training                         [ ]       Modalities  [X]              Therapeutic Exercises           [ ]       Edema Management/Control  [X]              Therapeutic Activities            [X]       Patient and Family Training/Education  [ ]              Other (comment):  Discharge Recommendations: None and To Be Determined  Further Equipment Recommendations for Discharge: none likely       SUBJECTIVE:   Patient stated It really hurts when I get up, and lying down is the worst.      OBJECTIVE DATA SUMMARY:   Chart checked, pt cleared by nursing. Critical Behavior:  Neurologic State: Alert  Orientation Level: Oriented X4  Cognition: Follows commands  Safety/Judgement: Awareness of environment     Strength:                functional           Functional Mobility Training:  Bed Mobility:  Rolling: Stand-by asssistance  Supine to Sit: Stand-by asssistance              Transfers:  Sit to Stand: Stand-by asssistance  Stand to Sit: Stand-by asssistance                             Balance:  Sitting: Intact; Without support  Standing: Impaired  Standing - Static: Good  Standing - Dynamic :  (with support)  Ambulation/Gait Training:  Distance (ft): 80 Feet (ft)  Assistive Device: Gait belt (pt pushing iv pole)  Ambulation - Level of Assistance: Stand-by asssistance        Gait Abnormalities: Antalgic;Decreased step clearance        Base of Support: Narrowed     Speed/Radha: Slow                                  Stairs:           Neuro Re-Education:     Therapeutic Exercises:   Reminded to perform ankle pumps 10 reps hourly, minimum   Pain:  Pain Scale 1: Numeric (0 - 10)  Pain Intensity 1: 2-4 at rest, 10 with transition supine to sit and 5 while amb  Pain Location 1: Abdomen  Pain Orientation 1: Lower  Pain Description 1: Aching  Pain Intervention(s) 1: Encouraged PCA  Activity Tolerance:   See assessment above  Please refer to the flowsheet for vital signs taken during this treatment.   After treatment:   [X]  Patient left in no apparent distress sitting up in chair  [ ]  Patient left in no apparent distress in bed  [X]  Call bell left within reach  [X]  Nursing notified  [ ]  Caregiver present  [ ]  Bed alarm activated      COMMUNICATION/COLLABORATION:   The patients plan of care was discussed with: Registered Nurse     Keiko Jackson   Time Calculation: 22 mins

## 2017-10-13 NOTE — PROGRESS NOTES
Bedside shift change report given to Harpreet Hammond (oncoming nurse) by Jefferson Lesches (offgoing nurse). Report included the following information SBAR, Kardex, Intake/Output and MAR.

## 2017-10-13 NOTE — ROUTINE PROCESS
Bedside shift change report given to ESVIN Cifuentes (oncoming nurse) by Tina Lopez RN (offgoing nurse). Report included the following information SBAR, Procedure Summary, Intake/Output, Recent Results and Med Rec Status.

## 2017-10-13 NOTE — PROGRESS NOTES
Surgery Progress Note    Admit Date: 10/6/2017      Subjective:     Complaints of more pain today. Pain at rest in 3/10 and during movement 10/10. Pain is located near Chris drains. He states that he has been using his PCA more today than yesterday. Denies nausea, vomiting, fevers or chills. + flatus. Objective:     Patient Vitals for the past 8 hrs:   BP Temp Pulse Resp SpO2 Weight   10/13/17 1130 135/73 97.9 °F (36.6 °C) 91 20 96 % -   10/13/17 0707 141/70 98.4 °F (36.9 °C) 85 18 96 % -   10/13/17 0423 146/75 97.8 °F (36.6 °C) 85 18 97 % 215 lb (97.5 kg)     10/13 0701 - 10/13 1900  In: -   Out: 6086 [Urine:1350; Drains:80]  10/11 1901 - 10/13 0700  In: 7780 [P.O.:120; I.V.:3314]  Out: 4485 [Urine:3850; Drains:635]ip  Physical Exam:    General: alert, cooperative, no distress  Cardiac: normal S1 and S2  Lungs: Normal chest wall and respirations. Clear to auscultation. Abdomen: soft, nondistended, CHRIS drain #1- serous anginous output large amount, CHRIS # 2 minimal seorus anginous output.    Wounds:clean, dry        CBC: Lab Results   Component Value Date/Time    WBC 14.7 10/13/2017 04:35 AM    RBC 3.03 10/13/2017 04:35 AM    HGB 9.3 10/13/2017 04:35 AM    HCT 26.8 10/13/2017 04:35 AM    PLATELET 399 25/04/0898 04:35 AM     BMP: Lab Results   Component Value Date/Time    Glucose 137 10/13/2017 04:35 AM    Sodium 135 10/13/2017 04:35 AM    Potassium 3.3 10/13/2017 04:35 AM    Chloride 98 10/13/2017 04:35 AM    CO2 29 10/13/2017 04:35 AM    BUN 8 10/13/2017 04:35 AM    Creatinine 0.46 10/13/2017 04:35 AM    Calcium 7.6 10/13/2017 04:35 AM     CMP:  Lab Results   Component Value Date/Time    Glucose 137 10/13/2017 04:35 AM    Sodium 135 10/13/2017 04:35 AM    Potassium 3.3 10/13/2017 04:35 AM    Chloride 98 10/13/2017 04:35 AM    CO2 29 10/13/2017 04:35 AM    BUN 8 10/13/2017 04:35 AM    Creatinine 0.46 10/13/2017 04:35 AM    Calcium 7.6 10/13/2017 04:35 AM    Anion gap 8 10/13/2017 04:35 AM    BUN/Creatinine ratio 17 10/13/2017 04:35 AM    Alk. phosphatase 75 10/13/2017 04:35 AM    Protein, total 4.5 10/13/2017 04:35 AM    Albumin 1.8 10/13/2017 04:35 AM    Globulin 2.7 10/13/2017 04:35 AM    A-G Ratio 0.7 10/13/2017 04:35 AM             Assessment:   Pt is POD #7 s/p Procedure(s):  WHIPPLE PROCEDURE       Plan:   Continue PCA  Continue Is  Awaiting CT results, continue drains to bulb suction. Gi proph and DVT proph-lovenox  Meropenem    Further plan per Dr. Anjali Murrell. Keith Barron, NIKI          I have independently examined the patient and have reviewed the chart. I agree with the above plan. CT reviewed and fluid collections appear stable or slightly improved. Patient remains afebrile. No new complaints. BM yesterday. OOB/ambulating. Denies CP or SOB. Abd soft, mildly distended. Incision with small amounts of thin tan drainage and erythema. Labs reviewed, WBC improving. Will change off PCA to PO meds with PRN IV for breakthrough today. Continue meropenem. Will transition to PO abx tomorrow if continues to improve. Will check drain lipase levels again tomorrow. Continue drains to bulb suction currently. Encourage ambulation. Continue DVT proph. Continue GI lite diet. Continue diuresis with lasix and decrease IVF again today.         Byron Martínez MD  10/13/2017  2:13 PM

## 2017-10-14 LAB
ALBUMIN SERPL-MCNC: 2 G/DL (ref 3.5–5)
ALBUMIN/GLOB SERPL: 0.7 {RATIO} (ref 1.1–2.2)
ALP SERPL-CCNC: 87 U/L (ref 45–117)
ALT SERPL-CCNC: 27 U/L (ref 12–78)
ANION GAP SERPL CALC-SCNC: 8 MMOL/L (ref 5–15)
AST SERPL-CCNC: 14 U/L (ref 15–37)
ATRIAL RATE: 80 BPM
ATRIAL RATE: 88 BPM
BASOPHILS # BLD: 0 K/UL (ref 0–0.1)
BASOPHILS NFR BLD: 0 % (ref 0–1)
BILIRUB SERPL-MCNC: 0.5 MG/DL (ref 0.2–1)
BUN SERPL-MCNC: 9 MG/DL (ref 6–20)
BUN/CREAT SERPL: 17 (ref 12–20)
CALCIUM SERPL-MCNC: 8.2 MG/DL (ref 8.5–10.1)
CALCULATED P AXIS, ECG09: 52 DEGREES
CALCULATED P AXIS, ECG09: 61 DEGREES
CALCULATED R AXIS, ECG10: -19 DEGREES
CALCULATED R AXIS, ECG10: -4 DEGREES
CALCULATED T AXIS, ECG11: -140 DEGREES
CALCULATED T AXIS, ECG11: 74 DEGREES
CHLORIDE SERPL-SCNC: 100 MMOL/L (ref 97–108)
CO2 SERPL-SCNC: 29 MMOL/L (ref 21–32)
CREAT SERPL-MCNC: 0.53 MG/DL (ref 0.7–1.3)
DIAGNOSIS, 93000: NORMAL
DIAGNOSIS, 93000: NORMAL
DIFFERENTIAL METHOD BLD: ABNORMAL
EOSINOPHIL # BLD: 0.3 K/UL (ref 0–0.4)
EOSINOPHIL NFR BLD: 2 % (ref 0–7)
ERYTHROCYTE [DISTWIDTH] IN BLOOD BY AUTOMATED COUNT: 14.9 % (ref 11.5–14.5)
GLOBULIN SER CALC-MCNC: 2.9 G/DL (ref 2–4)
GLUCOSE BLD STRIP.AUTO-MCNC: 128 MG/DL (ref 65–100)
GLUCOSE BLD STRIP.AUTO-MCNC: 142 MG/DL (ref 65–100)
GLUCOSE SERPL-MCNC: 144 MG/DL (ref 65–100)
HCT VFR BLD AUTO: 28.6 % (ref 36.6–50.3)
HGB BLD-MCNC: 9.7 G/DL (ref 12.1–17)
LIPASE FLD-CCNC: 136 U/L
LIPASE FLD-CCNC: 83 U/L
LYMPHOCYTES # BLD: 0.5 K/UL (ref 0.8–3.5)
LYMPHOCYTES NFR BLD: 3 % (ref 12–49)
MAGNESIUM SERPL-MCNC: 2.2 MG/DL (ref 1.6–2.4)
MCH RBC QN AUTO: 30.6 PG (ref 26–34)
MCHC RBC AUTO-ENTMCNC: 33.9 G/DL (ref 30–36.5)
MCV RBC AUTO: 90.2 FL (ref 80–99)
METAMYELOCYTES NFR BLD MANUAL: 1 %
MONOCYTES # BLD: 0.9 K/UL (ref 0–1)
MONOCYTES NFR BLD: 5 % (ref 5–13)
MYELOCYTES NFR BLD MANUAL: 3 %
NEUTS BAND NFR BLD MANUAL: 8 % (ref 0–6)
NEUTS SEG # BLD: 15 K/UL (ref 1.8–8)
NEUTS SEG NFR BLD: 78 % (ref 32–75)
P-R INTERVAL, ECG05: 162 MS
P-R INTERVAL, ECG05: 164 MS
PHOSPHATE SERPL-MCNC: 3.2 MG/DL (ref 2.6–4.7)
PLATELET # BLD AUTO: 278 K/UL (ref 150–400)
POTASSIUM SERPL-SCNC: 3.7 MMOL/L (ref 3.5–5.1)
PROT SERPL-MCNC: 4.9 G/DL (ref 6.4–8.2)
Q-T INTERVAL, ECG07: 378 MS
Q-T INTERVAL, ECG07: 426 MS
QRS DURATION, ECG06: 100 MS
QRS DURATION, ECG06: 110 MS
QTC CALCULATION (BEZET), ECG08: 457 MS
QTC CALCULATION (BEZET), ECG08: 491 MS
RBC # BLD AUTO: 3.17 M/UL (ref 4.1–5.7)
RBC MORPH BLD: ABNORMAL
SERVICE CMNT-IMP: ABNORMAL
SERVICE CMNT-IMP: ABNORMAL
SODIUM SERPL-SCNC: 137 MMOL/L (ref 136–145)
SPECIMEN SOURCE FLD: NORMAL
SPECIMEN SOURCE FLD: NORMAL
VENTRICULAR RATE, ECG03: 80 BPM
VENTRICULAR RATE, ECG03: 88 BPM
WBC # BLD AUTO: 17.4 K/UL (ref 4.1–11.1)
WBC MORPH BLD: ABNORMAL

## 2017-10-14 PROCEDURE — 85025 COMPLETE CBC W/AUTO DIFF WBC: CPT | Performed by: SURGERY

## 2017-10-14 PROCEDURE — 80053 COMPREHEN METABOLIC PANEL: CPT | Performed by: SURGERY

## 2017-10-14 PROCEDURE — 74011000258 HC RX REV CODE- 258: Performed by: SURGERY

## 2017-10-14 PROCEDURE — 83735 ASSAY OF MAGNESIUM: CPT | Performed by: SURGERY

## 2017-10-14 PROCEDURE — 74011250636 HC RX REV CODE- 250/636: Performed by: SURGERY

## 2017-10-14 PROCEDURE — 84100 ASSAY OF PHOSPHORUS: CPT | Performed by: SURGERY

## 2017-10-14 PROCEDURE — 74011250637 HC RX REV CODE- 250/637: Performed by: INTERNAL MEDICINE

## 2017-10-14 PROCEDURE — 36415 COLL VENOUS BLD VENIPUNCTURE: CPT | Performed by: SURGERY

## 2017-10-14 PROCEDURE — 65660000000 HC RM CCU STEPDOWN

## 2017-10-14 PROCEDURE — 74011636637 HC RX REV CODE- 636/637: Performed by: SURGERY

## 2017-10-14 PROCEDURE — 36592 COLLECT BLOOD FROM PICC: CPT

## 2017-10-14 PROCEDURE — 74011250637 HC RX REV CODE- 250/637: Performed by: SURGERY

## 2017-10-14 PROCEDURE — 82962 GLUCOSE BLOOD TEST: CPT

## 2017-10-14 PROCEDURE — 83690 ASSAY OF LIPASE: CPT | Performed by: SURGERY

## 2017-10-14 RX ORDER — SULFAMETHOXAZOLE AND TRIMETHOPRIM 800; 160 MG/1; MG/1
1 TABLET ORAL EVERY 12 HOURS
Status: DISCONTINUED | OUTPATIENT
Start: 2017-10-14 | End: 2017-10-16 | Stop reason: HOSPADM

## 2017-10-14 RX ADMIN — Medication 10 ML: at 21:13

## 2017-10-14 RX ADMIN — SIMETHICONE CHEW TAB 80 MG 80 MG: 80 TABLET ORAL at 11:59

## 2017-10-14 RX ADMIN — INSULIN LISPRO 2 UNITS: 100 INJECTION, SOLUTION INTRAVENOUS; SUBCUTANEOUS at 05:51

## 2017-10-14 RX ADMIN — HYDROCODONE BITARTRATE AND ACETAMINOPHEN 2 TABLET: 5; 325 TABLET ORAL at 03:41

## 2017-10-14 RX ADMIN — Medication 10 ML: at 05:11

## 2017-10-14 RX ADMIN — Medication 10 ML: at 14:00

## 2017-10-14 RX ADMIN — FAMOTIDINE 20 MG: 20 TABLET ORAL at 19:05

## 2017-10-14 RX ADMIN — ENOXAPARIN SODIUM 40 MG: 40 INJECTION SUBCUTANEOUS at 09:54

## 2017-10-14 RX ADMIN — SULFAMETHOXAZOLE AND TRIMETHOPRIM 1 TABLET: 800; 160 TABLET ORAL at 21:12

## 2017-10-14 RX ADMIN — HYDROCODONE BITARTRATE AND ACETAMINOPHEN 1 TABLET: 5; 325 TABLET ORAL at 09:54

## 2017-10-14 RX ADMIN — AMIODARONE HYDROCHLORIDE 200 MG: 200 TABLET ORAL at 09:58

## 2017-10-14 RX ADMIN — GABAPENTIN 900 MG: 300 CAPSULE ORAL at 19:05

## 2017-10-14 RX ADMIN — FAMOTIDINE 20 MG: 20 TABLET ORAL at 09:59

## 2017-10-14 RX ADMIN — HYDROCODONE BITARTRATE AND ACETAMINOPHEN 2 TABLET: 5; 325 TABLET ORAL at 21:12

## 2017-10-14 RX ADMIN — SIMETHICONE CHEW TAB 80 MG 80 MG: 80 TABLET ORAL at 16:26

## 2017-10-14 RX ADMIN — HYDROCODONE BITARTRATE AND ACETAMINOPHEN 1 TABLET: 5; 325 TABLET ORAL at 11:59

## 2017-10-14 RX ADMIN — MEROPENEM 500 MG: 500 INJECTION, POWDER, FOR SOLUTION INTRAVENOUS at 05:50

## 2017-10-14 RX ADMIN — LISINOPRIL 5 MG: 5 TABLET ORAL at 09:59

## 2017-10-14 RX ADMIN — MEROPENEM 500 MG: 500 INJECTION, POWDER, FOR SOLUTION INTRAVENOUS at 00:25

## 2017-10-14 RX ADMIN — GABAPENTIN 900 MG: 300 CAPSULE ORAL at 09:58

## 2017-10-14 RX ADMIN — AMIODARONE HYDROCHLORIDE 200 MG: 200 TABLET ORAL at 19:04

## 2017-10-14 RX ADMIN — SULFAMETHOXAZOLE AND TRIMETHOPRIM 1 TABLET: 800; 160 TABLET ORAL at 12:08

## 2017-10-14 RX ADMIN — DOCUSATE SODIUM 100 MG: 100 CAPSULE, LIQUID FILLED ORAL at 09:59

## 2017-10-14 RX ADMIN — DOCUSATE SODIUM 100 MG: 100 CAPSULE, LIQUID FILLED ORAL at 19:04

## 2017-10-14 RX ADMIN — HYDROCODONE BITARTRATE AND ACETAMINOPHEN 1 TABLET: 5; 325 TABLET ORAL at 16:26

## 2017-10-14 NOTE — PROGRESS NOTES
Bedside shift change report given to Sunny Montez RN (oncoming nurse) by Keiko Peng RN (offgoing nurse). Report included the following information SBAR, Kardex and Recent Results.

## 2017-10-14 NOTE — ROUTINE PROCESS
Bedside shift change report given to ESVIN Cifuentes (oncoming nurse) by Senthil Valdovinos RN (offgoing nurse).  Report included the following information SBAR, recent results, MAR.

## 2017-10-14 NOTE — PROGRESS NOTES
Problem: Falls - Risk of  Goal: *Absence of Falls  Document Rosalia Fall Risk and appropriate interventions in the flowsheet.    Outcome: Progressing Towards Goal  Fall Risk Interventions:  Mobility Interventions: Patient to call before getting OOB           Medication Interventions: Assess postural VS orthostatic hypotension, Patient to call before getting OOB, Teach patient to arise slowly     Elimination Interventions: Call light in reach, Toileting schedule/hourly rounds, Patient to call for help with toileting needs

## 2017-10-14 NOTE — PROGRESS NOTES
Problem: Surgical Pathway Post-Op Day 2 through Discharge  Goal: *No signs and symptoms of infection or wound complications  Outcome: Progressing Towards Goal  Surgical sites are clean dry and intact. Patient afebrile.

## 2017-10-14 NOTE — PROGRESS NOTES
[facility] General Surgery    POD #8 s/p pylorus preserving whipple for distal cholangiocarcinoma. Subjective     No acute events overnight. Pain well controlled. No n/v. Having bowel function. Afebrile. OOB/Ambulating. Objective     Patient Vitals for the past 24 hrs:   Temp Pulse Resp BP SpO2   10/14/17 0920 97.9 °F (36.6 °C) 82 16 151/69 99 %   10/14/17 0450 98.6 °F (37 °C) 72 - 145/72 97 %   10/14/17 0007 98.6 °F (37 °C) 89 18 110/90 99 %   10/13/17 1912 98.3 °F (36.8 °C) 88 20 130/63 97 %   10/13/17 1748 - 91 - 152/75 -   10/13/17 1509 98.2 °F (36.8 °C) 86 18 147/74 97 %         Date 10/13/17 0700 - 10/14/17 0659 10/14/17 0700 - 10/15/17 0659   Shift 1963-6967 1819-5137 24 Hour Total 0769-0385 2745-2743 24 Hour Total   I  N  T  A  K  E   P.O. 240 360 600         P. O. 240 360 600       I.V.  (mL/kg/hr) 892.5  (0.8) 482.9  (0.4) 1375.4  (0.6)         Volume (0.9% sodium chloride infusion) 842.5 332.9 1175.4         Volume (meropenem (MERREM) 500 mg in 0.9% sodium chloride (MBP/ADV) 50 mL) 50 150 200       Shift Total  (mL/kg) 1132.5  (11.6) 842.9  (9.1) 1975.4  (21.2)      O  U  T  P  U  T   Urine  (mL/kg/hr) 1350  (1.2) 4000  (3.6) 5350  (2.4) 500  500      Urine Voided 1350 4000 5350 500  500      Urine Occurrence(s)  1 x 1 x       Drains 280 170 450 30  30      Output (ml) Veronika Fend Drain #1 10/06/17 Right; Lower Abdomen) 250 140 390 20  20      Output (ml) (Sandra Fuchs #2 10/06/17 Right; Lower Abdomen) 30 30 60 10  10    Shift Total  (mL/kg) 1630  (16.7) 4170  (44.8) 5800  (62.4) 530  (5.7)  530  (5.7)   NET -497.5 -3327.1 -3824.6 -530  -530   Weight (kg) 97.5 93 93 93 93 93       PE  GEN - Awake, alert, communicating appropriately. Appears comfortable. Pulm - CTAB  CV - RRR  Abd - soft, less distended.  + BS. Appropriately tender. Incision with some thin tan drainage at middle and lower portions of incision.   Minimal surrounding cellulitis. Ext - warm, well perfused, no significant edema. Labs  Recent Results (from the past 24 hour(s))   GLUCOSE, POC    Collection Time: 10/13/17  5:45 PM   Result Value Ref Range    Glucose (POC) 139 (H) 65 - 100 mg/dL    Performed by Sakshi Cifuentes    GLUCOSE, POC    Collection Time: 10/14/17 12:06 AM   Result Value Ref Range    Glucose (POC) 128 (H) 65 - 100 mg/dL    Performed by Chely SALEH(CON)    CBC WITH AUTOMATED DIFF    Collection Time: 10/14/17  5:24 AM   Result Value Ref Range    WBC 17.4 (H) 4.1 - 11.1 K/uL    RBC 3.17 (L) 4.10 - 5.70 M/uL    HGB 9.7 (L) 12.1 - 17.0 g/dL    HCT 28.6 (L) 36.6 - 50.3 %    MCV 90.2 80.0 - 99.0 FL    MCH 30.6 26.0 - 34.0 PG    MCHC 33.9 30.0 - 36.5 g/dL    RDW 14.9 (H) 11.5 - 14.5 %    PLATELET 215 431 - 329 K/uL    NEUTROPHILS 78 (H) 32 - 75 %    BAND NEUTROPHILS 8 (H) 0 - 6 %    LYMPHOCYTES 3 (L) 12 - 49 %    MONOCYTES 5 5 - 13 %    EOSINOPHILS 2 0 - 7 %    BASOPHILS 0 0 - 1 %    METAMYELOCYTES 1 (H) 0 %    MYELOCYTES 3 (H) 0 %    ABS. NEUTROPHILS 15.0 (H) 1.8 - 8.0 K/UL    ABS. LYMPHOCYTES 0.5 (L) 0.8 - 3.5 K/UL    ABS. MONOCYTES 0.9 0.0 - 1.0 K/UL    ABS. EOSINOPHILS 0.3 0.0 - 0.4 K/UL    ABS. BASOPHILS 0.0 0.0 - 0.1 K/UL    DF MANUAL      RBC COMMENTS ANISOCYTOSIS  1+        WBC COMMENTS TOXIC GRANULATION     METABOLIC PANEL, COMPREHENSIVE    Collection Time: 10/14/17  5:24 AM   Result Value Ref Range    Sodium 137 136 - 145 mmol/L    Potassium 3.7 3.5 - 5.1 mmol/L    Chloride 100 97 - 108 mmol/L    CO2 29 21 - 32 mmol/L    Anion gap 8 5 - 15 mmol/L    Glucose 144 (H) 65 - 100 mg/dL    BUN 9 6 - 20 MG/DL    Creatinine 0.53 (L) 0.70 - 1.30 MG/DL    BUN/Creatinine ratio 17 12 - 20      GFR est AA >60 >60 ml/min/1.73m2    GFR est non-AA >60 >60 ml/min/1.73m2    Calcium 8.2 (L) 8.5 - 10.1 MG/DL    Bilirubin, total 0.5 0.2 - 1.0 MG/DL    ALT (SGPT) 27 12 - 78 U/L    AST (SGOT) 14 (L) 15 - 37 U/L    Alk.  phosphatase 87 45 - 117 U/L    Protein, total 4.9 (L) 6.4 - 8.2 g/dL    Albumin 2.0 (L) 3.5 - 5.0 g/dL    Globulin 2.9 2.0 - 4.0 g/dL    A-G Ratio 0.7 (L) 1.1 - 2.2     MAGNESIUM    Collection Time: 10/14/17  5:24 AM   Result Value Ref Range    Magnesium 2.2 1.6 - 2.4 mg/dL   PHOSPHORUS    Collection Time: 10/14/17  5:24 AM   Result Value Ref Range    Phosphorus 3.2 2.6 - 4.7 MG/DL   LIPASE, FLUID    Collection Time: 10/14/17  5:24 AM   Result Value Ref Range    Fluid Type: DRAINAGE      Lipase, fluid 83 U/L   LIPASE, FLUID    Collection Time: 10/14/17  5:24 AM   Result Value Ref Range    Fluid Type: DRAINAGE      Lipase, fluid 136 U/L   GLUCOSE, POC    Collection Time: 10/14/17  5:38 AM   Result Value Ref Range    Glucose (POC) 142 (H) 65 - 100 mg/dL    Performed by Juan Yao)        Assessment     Wilmer Rivera is a 65 y. o.yr old male s/p whipple for distal cholangiocarcinoma. Pathology shows pT3N1 cholangiocarcinoma. Plan     N: PO pain meds  CV: Appreciate Cardiology input. HR stable on amiodarone. P: Continue pulmonary toilet, IS. GI:  Continue GI lite diet. Drains serosanguinous. Drain lipase levels now < 200. Will remove lateral drain today. Will likely leave medial drain in place at time of discharge. Will stop insulin and finger sticks as has not required coverage. Continue pepcid for reflux. R: Lytes improved. H/ID: WBC up a little today. Afebrile. Hgb stable. Will change to bactrim today. May need to open incision if WBC continues to go up but will continue to watch for now. MSK: DVT proph with lovenox. Continue SCD's. Ambulate/OOB.   Dispo: Home early next week    Jeromy Carmen MD  10/14/2017  11:53 AM

## 2017-10-15 LAB
ALBUMIN SERPL-MCNC: 2.1 G/DL (ref 3.5–5)
ALBUMIN/GLOB SERPL: 0.7 {RATIO} (ref 1.1–2.2)
ALP SERPL-CCNC: 83 U/L (ref 45–117)
ALT SERPL-CCNC: 25 U/L (ref 12–78)
ANION GAP SERPL CALC-SCNC: 9 MMOL/L (ref 5–15)
AST SERPL-CCNC: 11 U/L (ref 15–37)
BASOPHILS # BLD: 0.2 K/UL (ref 0–0.1)
BASOPHILS NFR BLD: 1 % (ref 0–1)
BILIRUB SERPL-MCNC: 0.4 MG/DL (ref 0.2–1)
BUN SERPL-MCNC: 10 MG/DL (ref 6–20)
BUN/CREAT SERPL: 17 (ref 12–20)
CALCIUM SERPL-MCNC: 7.9 MG/DL (ref 8.5–10.1)
CHLORIDE SERPL-SCNC: 100 MMOL/L (ref 97–108)
CO2 SERPL-SCNC: 28 MMOL/L (ref 21–32)
CREAT SERPL-MCNC: 0.58 MG/DL (ref 0.7–1.3)
DIFFERENTIAL METHOD BLD: ABNORMAL
EOSINOPHIL # BLD: 0.3 K/UL (ref 0–0.4)
EOSINOPHIL NFR BLD: 2 % (ref 0–7)
ERYTHROCYTE [DISTWIDTH] IN BLOOD BY AUTOMATED COUNT: 15.3 % (ref 11.5–14.5)
GLOBULIN SER CALC-MCNC: 2.9 G/DL (ref 2–4)
GLUCOSE SERPL-MCNC: 129 MG/DL (ref 65–100)
HCT VFR BLD AUTO: 28.9 % (ref 36.6–50.3)
HGB BLD-MCNC: 9.6 G/DL (ref 12.1–17)
LYMPHOCYTES # BLD: 0.2 K/UL (ref 0.8–3.5)
LYMPHOCYTES NFR BLD: 1 % (ref 12–49)
MAGNESIUM SERPL-MCNC: 2 MG/DL (ref 1.6–2.4)
MCH RBC QN AUTO: 30.5 PG (ref 26–34)
MCHC RBC AUTO-ENTMCNC: 33.2 G/DL (ref 30–36.5)
MCV RBC AUTO: 91.7 FL (ref 80–99)
METAMYELOCYTES NFR BLD MANUAL: 7 %
MONOCYTES # BLD: 0.7 K/UL (ref 0–1)
MONOCYTES NFR BLD: 4 % (ref 5–13)
MYELOCYTES NFR BLD MANUAL: 9 %
NEUTS BAND NFR BLD MANUAL: 2 % (ref 0–6)
NEUTS SEG # BLD: 13.2 K/UL (ref 1.8–8)
NEUTS SEG NFR BLD: 74 % (ref 32–75)
PHOSPHATE SERPL-MCNC: 4 MG/DL (ref 2.6–4.7)
PLATELET # BLD AUTO: 299 K/UL (ref 150–400)
POTASSIUM SERPL-SCNC: 3.5 MMOL/L (ref 3.5–5.1)
PROT SERPL-MCNC: 5 G/DL (ref 6.4–8.2)
RBC # BLD AUTO: 3.15 M/UL (ref 4.1–5.7)
RBC MORPH BLD: ABNORMAL
SODIUM SERPL-SCNC: 137 MMOL/L (ref 136–145)
WBC # BLD AUTO: 17.4 K/UL (ref 4.1–11.1)

## 2017-10-15 PROCEDURE — 74011250637 HC RX REV CODE- 250/637: Performed by: INTERNAL MEDICINE

## 2017-10-15 PROCEDURE — 84100 ASSAY OF PHOSPHORUS: CPT | Performed by: SURGERY

## 2017-10-15 PROCEDURE — 74011250636 HC RX REV CODE- 250/636: Performed by: SURGERY

## 2017-10-15 PROCEDURE — 74011250637 HC RX REV CODE- 250/637: Performed by: SURGERY

## 2017-10-15 PROCEDURE — 83735 ASSAY OF MAGNESIUM: CPT | Performed by: SURGERY

## 2017-10-15 PROCEDURE — 36415 COLL VENOUS BLD VENIPUNCTURE: CPT | Performed by: SURGERY

## 2017-10-15 PROCEDURE — 85025 COMPLETE CBC W/AUTO DIFF WBC: CPT | Performed by: SURGERY

## 2017-10-15 PROCEDURE — 80053 COMPREHEN METABOLIC PANEL: CPT | Performed by: SURGERY

## 2017-10-15 PROCEDURE — 65270000029 HC RM PRIVATE

## 2017-10-15 RX ADMIN — SIMETHICONE CHEW TAB 80 MG 80 MG: 80 TABLET ORAL at 18:28

## 2017-10-15 RX ADMIN — HYDROCODONE BITARTRATE AND ACETAMINOPHEN 2 TABLET: 5; 325 TABLET ORAL at 14:07

## 2017-10-15 RX ADMIN — LISINOPRIL 5 MG: 5 TABLET ORAL at 08:32

## 2017-10-15 RX ADMIN — HYDROCODONE BITARTRATE AND ACETAMINOPHEN 2 TABLET: 5; 325 TABLET ORAL at 18:28

## 2017-10-15 RX ADMIN — GABAPENTIN 900 MG: 300 CAPSULE ORAL at 08:32

## 2017-10-15 RX ADMIN — Medication 10 ML: at 06:06

## 2017-10-15 RX ADMIN — HYDROCODONE BITARTRATE AND ACETAMINOPHEN 2 TABLET: 5; 325 TABLET ORAL at 22:40

## 2017-10-15 RX ADMIN — SODIUM CHLORIDE 25 ML/HR: 900 INJECTION, SOLUTION INTRAVENOUS at 04:20

## 2017-10-15 RX ADMIN — AMIODARONE HYDROCHLORIDE 200 MG: 200 TABLET ORAL at 08:32

## 2017-10-15 RX ADMIN — HYDROCODONE BITARTRATE AND ACETAMINOPHEN 2 TABLET: 5; 325 TABLET ORAL at 08:31

## 2017-10-15 RX ADMIN — ENOXAPARIN SODIUM 40 MG: 40 INJECTION SUBCUTANEOUS at 08:33

## 2017-10-15 RX ADMIN — DOCUSATE SODIUM 100 MG: 100 CAPSULE, LIQUID FILLED ORAL at 18:28

## 2017-10-15 RX ADMIN — SIMETHICONE CHEW TAB 80 MG 80 MG: 80 TABLET ORAL at 14:07

## 2017-10-15 RX ADMIN — SULFAMETHOXAZOLE AND TRIMETHOPRIM 1 TABLET: 800; 160 TABLET ORAL at 21:36

## 2017-10-15 RX ADMIN — Medication 10 ML: at 21:36

## 2017-10-15 RX ADMIN — AMIODARONE HYDROCHLORIDE 200 MG: 200 TABLET ORAL at 18:28

## 2017-10-15 RX ADMIN — FAMOTIDINE 20 MG: 20 TABLET ORAL at 18:29

## 2017-10-15 RX ADMIN — SIMETHICONE CHEW TAB 80 MG 80 MG: 80 TABLET ORAL at 22:41

## 2017-10-15 RX ADMIN — GABAPENTIN 900 MG: 300 CAPSULE ORAL at 18:28

## 2017-10-15 RX ADMIN — SULFAMETHOXAZOLE AND TRIMETHOPRIM 1 TABLET: 800; 160 TABLET ORAL at 08:32

## 2017-10-15 RX ADMIN — FAMOTIDINE 20 MG: 20 TABLET ORAL at 08:32

## 2017-10-15 RX ADMIN — SIMETHICONE CHEW TAB 80 MG 80 MG: 80 TABLET ORAL at 08:31

## 2017-10-15 NOTE — PROGRESS NOTES
Cardiology Progress Note                                        Admit Date: 10/6/2017    Assessment/Plan:     PAF; remains in sinus; continue current regimen    Heidi Stein is a 65 y.o. male with       PROBLEM LIST:  Patient Active Problem List    Diagnosis Date Noted    Cholangiocarcinoma determined by biopsy of biliary tract ([facility] [place] [facility] 10/06/2017    Cholangiocarcinoma of biliary tract ([facility] [place] [facility] 09/11/2017    Common bile duct (CBD) obstruction 07/25/2017    UTI (urinary tract infection) 07/25/2017    Obstructive jaundice 07/23/2017         Subjective:     Heidi Stein reports none. Visit Vitals    /69 (BP 1 Location: Left arm, BP Patient Position: At rest)    Pulse 80    Temp 97.6 °F (36.4 °C)    Resp 16    Ht 5' 8\" (1.727 m)    Wt 89.8 kg (197 lb 15.6 oz)    SpO2 97%    BMI 30.1 kg/m2       Intake/Output Summary (Last 24 hours) at 10/15/17 0814  Last data filed at 10/15/17 0420   Gross per 24 hour   Intake           1667.5 ml   Output              930 ml   Net            737.5 ml       Objective:      Physical Exam:  HEENT: Perrla, EOMI  Neck: No JVD,  No thyroidmegaly  Resp: CTA bilaterally;  No wheezes or rales  CV: RRR s1s2 No murmur no s3  Abd:Soft, Nontender  Ext: No edema  Neuro: Alert and oriented; Nonfocal  Skin: Warm, Dry, Intact  Pulses: 2+ DP/PT/Rad      Telemetry: normal sinus rhythm    Current Facility-Administered Medications   Medication Dose Route Frequency    trimethoprim-sulfamethoxazole (BACTRIM DS, SEPTRA DS) 160-800 mg per tablet 1 Tab  1 Tab Oral Q12H    HYDROcodone-acetaminophen (NORCO) 5-325 mg per tablet 1 Tab  1 Tab Oral Q4H PRN    HYDROcodone-acetaminophen (NORCO) 5-325 mg per tablet 2 Tab  2 Tab Oral Q4H PRN    HYDROmorphone (PF) (DILAUDID) injection 1 mg  1 mg IntraVENous Q4H PRN    simethicone (MYLICON) tablet 80 mg  80 mg Oral QID PRN    famotidine (PEPCID) tablet 20 mg  20 mg Oral BID    lisinopril (PRINIVIL, ZESTRIL) tablet 5 mg  5 mg Oral DAILY    docusate sodium (COLACE) capsule 100 mg  100 mg Oral BID    amiodarone (CORDARONE) tablet 200 mg  200 mg Oral BID    sodium chloride (NS) flush 20 mL  20 mL InterCATHeter PRN    sodium chloride (NS) flush 10 mL  10 mL InterCATHeter Q24H    sodium chloride (NS) flush 10 mL  10 mL InterCATHeter PRN    sodium chloride (NS) flush 10 mL  10 mL InterCATHeter Q8H    alteplase (CATHFLO) 1 mg in sterile water (preservative free) 1 mL injection  1 mg InterCATHeter PRN    bacitracin 500 unit/gram packet 1 Packet  1 Packet Topical PRN    enoxaparin (LOVENOX) injection 40 mg  40 mg SubCUTAneous Q24H    0.9% sodium chloride infusion  25 mL/hr IntraVENous CONTINUOUS    gabapentin (NEURONTIN) capsule 900 mg  900 mg Oral BID    HYDROmorphone (PF) (DILAUDID) injection 1 mg  1 mg IntraVENous Q6H PRN    0.9% sodium chloride infusion 250 mL  250 mL IntraVENous PRN    sodium chloride (NS) flush 5-10 mL  5-10 mL IntraVENous Q8H    sodium chloride (NS) flush 5-10 mL  5-10 mL IntraVENous PRN    ondansetron (ZOFRAN) injection 4 mg  4 mg IntraVENous Q4H PRN    diphenhydrAMINE (BENADRYL) injection 12.5 mg  12.5 mg IntraVENous Q4H PRN    hydrALAZINE (APRESOLINE) 20 mg/mL injection 10 mg  10 mg IntraVENous Q6H PRN    glucose chewable tablet 16 g  4 Tab Oral PRN    dextrose (D50W) injection syrg 12.5-25 g  12.5-25 g IntraVENous PRN    glucagon (GLUCAGEN) injection 1 mg  1 mg IntraMUSCular PRN         Data Review:   Labs:    Recent Results (from the past 24 hour(s))   CBC WITH AUTOMATED DIFF    Collection Time: 10/15/17  4:10 AM   Result Value Ref Range    WBC 17.4 (H) 4.1 - 11.1 K/uL    RBC 3.15 (L) 4.10 - 5.70 M/uL    HGB 9.6 (L) 12.1 - 17.0 g/dL    HCT 28.9 (L) 36.6 - 50.3 %    MCV 91.7 80.0 - 99.0 FL    MCH 30.5 26.0 - 34.0 PG    MCHC 33.2 30.0 - 36.5 g/dL    RDW 15.3 (H) 11.5 - 14.5 %    PLATELET 118 419 - 991 K/uL    NEUTROPHILS 74 32 - 75 %    BAND NEUTROPHILS 2 0 - 6 %    LYMPHOCYTES 1 (L) 12 - 49 %    MONOCYTES 4 (L) 5 - 13 %    EOSINOPHILS 2 0 - 7 %    BASOPHILS 1 0 - 1 %    METAMYELOCYTES 7 (H) 0 %    MYELOCYTES 9 (H) 0 %    ABS. NEUTROPHILS 13.2 (H) 1.8 - 8.0 K/UL    ABS. LYMPHOCYTES 0.2 (L) 0.8 - 3.5 K/UL    ABS. MONOCYTES 0.7 0.0 - 1.0 K/UL    ABS. EOSINOPHILS 0.3 0.0 - 0.4 K/UL    ABS. BASOPHILS 0.2 (H) 0.0 - 0.1 K/UL    DF MANUAL      RBC COMMENTS ANISOCYTOSIS  1+       METABOLIC PANEL, COMPREHENSIVE    Collection Time: 10/15/17  4:10 AM   Result Value Ref Range    Sodium 137 136 - 145 mmol/L    Potassium 3.5 3.5 - 5.1 mmol/L    Chloride 100 97 - 108 mmol/L    CO2 28 21 - 32 mmol/L    Anion gap 9 5 - 15 mmol/L    Glucose 129 (H) 65 - 100 mg/dL    BUN 10 6 - 20 MG/DL    Creatinine 0.58 (L) 0.70 - 1.30 MG/DL    BUN/Creatinine ratio 17 12 - 20      GFR est AA >60 >60 ml/min/1.73m2    GFR est non-AA >60 >60 ml/min/1.73m2    Calcium 7.9 (L) 8.5 - 10.1 MG/DL    Bilirubin, total 0.4 0.2 - 1.0 MG/DL    ALT (SGPT) 25 12 - 78 U/L    AST (SGOT) 11 (L) 15 - 37 U/L    Alk.  phosphatase 83 45 - 117 U/L    Protein, total 5.0 (L) 6.4 - 8.2 g/dL    Albumin 2.1 (L) 3.5 - 5.0 g/dL    Globulin 2.9 2.0 - 4.0 g/dL    A-G Ratio 0.7 (L) 1.1 - 2.2     MAGNESIUM    Collection Time: 10/15/17  4:10 AM   Result Value Ref Range    Magnesium 2.0 1.6 - 2.4 mg/dL   PHOSPHORUS    Collection Time: 10/15/17  4:10 AM   Result Value Ref Range    Phosphorus 4.0 2.6 - 4.7 MG/DL

## 2017-10-15 NOTE — PROGRESS NOTES
TRANSFER - OUT REPORT:    Verbal report given to Osei Stephens RN(name) on Evelin Bernal  being transferred to [address] [place] [address](unit) for routine progression of care       Report consisted of patients Situation, Background, Assessment and   Recommendations(SBAR). Information from the following report(s) SBAR, Kardex and Recent Results was reviewed with the receiving nurse. Lines:   PICC Triple Lumen 02/[birth year] Right;Basilic (Active)   Central Line Being Utilized Yes 10/15/2017  8:24 AM   Criteria for Appropriate Use Limited/no vessel suitable for conventional peripheral access 10/15/2017  8:24 AM   Site Assessment Clean, dry, & intact 10/15/2017  8:24 AM   Phlebitis Assessment 0 10/15/2017  8:24 AM   Infiltration Assessment 0 10/15/2017  8:24 AM   Arm Circumference (cm) 32 cm 10/11/2017  2:06 PM   Date of Last Dressing Change 10/11/17 10/15/2017  8:24 AM   Dressing Status Clean, dry, & intact 10/15/2017  8:24 AM   External Catheter Length (cm) 0 centimeters 10/11/2017  2:06 PM   Dressing Type Disk with Chlorhexadine gluconate (CHG); Transparent;Tape 10/15/2017  8:24 AM   Action Taken Open ports on tubing capped 10/15/2017  8:24 AM   Hub Color/Line Status White;Flushed;Capped 10/15/2017  8:24 AM   Positive Blood Return (Site #1) Yes 10/15/2017  8:24 AM   Hub Color/Line Status Gray;Flushed;Capped 10/15/2017  8:24 AM   Positive Blood Return (Site #2) Yes 10/15/2017  8:24 AM   Hub Color/Line Status Red; Infusing 10/15/2017  8:24 AM   Positive Blood Return (Site #3) Yes 10/15/2017  4:20 AM   Alcohol Cap Used Yes 10/15/2017  8:24 AM        Opportunity for questions and clarification was provided.       Patient transported with:   [employer]

## 2017-10-15 NOTE — PROGRESS NOTES
TRANSFER - IN REPORT:    Verbal report received from Ulysses [address] RN(name) on [address]  being received from [facility](unit) for routine progression of care      Report consisted of patients Situation, Background, Assessment and   Recommendations(SBAR). Information from the following report(s) SBAR, Kardex, Intake/Output, MAR and Med Rec Status was reviewed with the receiving nurse. Opportunity for questions and clarification was provided. Assessment completed upon patients arrival to unit and care assumed.

## 2017-10-15 NOTE — PROGRESS NOTES
Problem: Falls - Risk of  Goal: *Absence of Falls  Document Rosalia Fall Risk and appropriate interventions in the flowsheet.    Outcome: Progressing Towards Goal  Fall Risk Interventions:  Mobility Interventions: Assess mobility with egress test, Patient to call before getting OOB           Medication Interventions: Assess postural VS orthostatic hypotension, Patient to call before getting OOB     Elimination Interventions: Call light in reach                 Problem: Surgical Pathway Post-Op Day 2 through Discharge  Goal: *No signs and symptoms of infection or wound complications  Outcome: Not Progressing Towards Goal  WBC trending up, possible need to drain incision if trend continues

## 2017-10-15 NOTE — PROGRESS NOTES
Bedside and Verbal shift change report given to Esau MCALLISTER and Eugenio Berry (oncoming nurse) by Jason Morelos RN (offgoing nurse). Report included the following information SBAR, Intake/Output, MAR, Recent Results and Cardiac Rhythm NSR.

## 2017-10-16 ENCOUNTER — PATIENT OUTREACH (OUTPATIENT)
Dept: OTHER | Age: 62
End: 2017-10-16

## 2017-10-16 VITALS
WEIGHT: 197.97 LBS | DIASTOLIC BLOOD PRESSURE: 82 MMHG | SYSTOLIC BLOOD PRESSURE: 132 MMHG | HEIGHT: 68 IN | RESPIRATION RATE: 17 BRPM | OXYGEN SATURATION: 96 % | HEART RATE: 91 BPM | BODY MASS INDEX: 30 KG/M2 | TEMPERATURE: 98.2 F

## 2017-10-16 LAB
ALBUMIN SERPL-MCNC: 2.2 G/DL (ref 3.5–5)
ALBUMIN/GLOB SERPL: 0.7 {RATIO} (ref 1.1–2.2)
ALP SERPL-CCNC: 84 U/L (ref 45–117)
ALT SERPL-CCNC: 21 U/L (ref 12–78)
ANION GAP SERPL CALC-SCNC: 9 MMOL/L (ref 5–15)
AST SERPL-CCNC: 11 U/L (ref 15–37)
BASOPHILS # BLD: 0 K/UL (ref 0–0.1)
BASOPHILS NFR BLD: 0 % (ref 0–1)
BILIRUB SERPL-MCNC: 0.4 MG/DL (ref 0.2–1)
BUN SERPL-MCNC: 12 MG/DL (ref 6–20)
BUN/CREAT SERPL: 15 (ref 12–20)
CALCIUM SERPL-MCNC: 8.5 MG/DL (ref 8.5–10.1)
CHLORIDE SERPL-SCNC: 101 MMOL/L (ref 97–108)
CO2 SERPL-SCNC: 25 MMOL/L (ref 21–32)
CREAT SERPL-MCNC: 0.79 MG/DL (ref 0.7–1.3)
DIFFERENTIAL METHOD BLD: ABNORMAL
EOSINOPHIL # BLD: 0.4 K/UL (ref 0–0.4)
EOSINOPHIL NFR BLD: 2 % (ref 0–7)
ERYTHROCYTE [DISTWIDTH] IN BLOOD BY AUTOMATED COUNT: 15.4 % (ref 11.5–14.5)
GLOBULIN SER CALC-MCNC: 3.1 G/DL (ref 2–4)
GLUCOSE SERPL-MCNC: 135 MG/DL (ref 65–100)
HCT VFR BLD AUTO: 30.2 % (ref 36.6–50.3)
HGB BLD-MCNC: 9.8 G/DL (ref 12.1–17)
LYMPHOCYTES # BLD: 1.9 K/UL (ref 0.8–3.5)
LYMPHOCYTES NFR BLD: 10 % (ref 12–49)
MAGNESIUM SERPL-MCNC: 2.3 MG/DL (ref 1.6–2.4)
MCH RBC QN AUTO: 30.1 PG (ref 26–34)
MCHC RBC AUTO-ENTMCNC: 32.5 G/DL (ref 30–36.5)
MCV RBC AUTO: 92.6 FL (ref 80–99)
METAMYELOCYTES NFR BLD MANUAL: 6 %
MONOCYTES # BLD: 0.6 K/UL (ref 0–1)
MONOCYTES NFR BLD: 3 % (ref 5–13)
MYELOCYTES NFR BLD MANUAL: 8 %
NEUTS BAND NFR BLD MANUAL: 3 % (ref 0–6)
NEUTS SEG # BLD: 13.2 K/UL (ref 1.8–8)
NEUTS SEG NFR BLD: 68 % (ref 32–75)
PHOSPHATE SERPL-MCNC: 4 MG/DL (ref 2.6–4.7)
PLATELET # BLD AUTO: 306 K/UL (ref 150–400)
PLATELET COMMENTS,PCOM: ABNORMAL
POTASSIUM SERPL-SCNC: 4.1 MMOL/L (ref 3.5–5.1)
PROT SERPL-MCNC: 5.3 G/DL (ref 6.4–8.2)
RBC # BLD AUTO: 3.26 M/UL (ref 4.1–5.7)
RBC MORPH BLD: ABNORMAL
SODIUM SERPL-SCNC: 135 MMOL/L (ref 136–145)
WBC # BLD AUTO: 18.6 K/UL (ref 4.1–11.1)

## 2017-10-16 PROCEDURE — 74011000250 HC RX REV CODE- 250: Performed by: NURSE PRACTITIONER

## 2017-10-16 PROCEDURE — 83735 ASSAY OF MAGNESIUM: CPT | Performed by: SURGERY

## 2017-10-16 PROCEDURE — 74011250636 HC RX REV CODE- 250/636: Performed by: SURGERY

## 2017-10-16 PROCEDURE — 74011250637 HC RX REV CODE- 250/637: Performed by: SURGERY

## 2017-10-16 PROCEDURE — 85025 COMPLETE CBC W/AUTO DIFF WBC: CPT | Performed by: SURGERY

## 2017-10-16 PROCEDURE — 74011250637 HC RX REV CODE- 250/637: Performed by: INTERNAL MEDICINE

## 2017-10-16 PROCEDURE — 80053 COMPREHEN METABOLIC PANEL: CPT | Performed by: SURGERY

## 2017-10-16 PROCEDURE — 36415 COLL VENOUS BLD VENIPUNCTURE: CPT | Performed by: SURGERY

## 2017-10-16 PROCEDURE — 84100 ASSAY OF PHOSPHORUS: CPT | Performed by: SURGERY

## 2017-10-16 RX ORDER — LISINOPRIL 5 MG/1
5 TABLET ORAL DAILY
Qty: 30 TAB | Refills: 3 | Status: SHIPPED | OUTPATIENT
Start: 2017-10-16 | End: 2017-10-16

## 2017-10-16 RX ORDER — AMIODARONE HYDROCHLORIDE 200 MG/1
200 TABLET ORAL 2 TIMES DAILY
Qty: 60 TAB | Refills: 3 | Status: ON HOLD | OUTPATIENT
Start: 2017-10-16 | End: 2018-02-15

## 2017-10-16 RX ORDER — HYDROCODONE BITARTRATE AND ACETAMINOPHEN 5; 325 MG/1; MG/1
1-2 TABLET ORAL
Qty: 50 TAB | Refills: 0 | Status: SHIPPED | OUTPATIENT
Start: 2017-10-16 | End: 2017-11-01

## 2017-10-16 RX ORDER — DOCUSATE SODIUM 100 MG/1
100 CAPSULE, LIQUID FILLED ORAL
Qty: 60 CAP | Refills: 0 | Status: ON HOLD | OUTPATIENT
Start: 2017-10-16 | End: 2018-02-15

## 2017-10-16 RX ORDER — SULFAMETHOXAZOLE AND TRIMETHOPRIM 800; 160 MG/1; MG/1
1 TABLET ORAL EVERY 12 HOURS
Qty: 20 TAB | Refills: 0 | Status: SHIPPED | OUTPATIENT
Start: 2017-10-16 | End: 2017-10-26

## 2017-10-16 RX ADMIN — DOCUSATE SODIUM 100 MG: 100 CAPSULE, LIQUID FILLED ORAL at 08:57

## 2017-10-16 RX ADMIN — Medication 10 ML: at 06:05

## 2017-10-16 RX ADMIN — HYDROCODONE BITARTRATE AND ACETAMINOPHEN 1 TABLET: 5; 325 TABLET ORAL at 08:57

## 2017-10-16 RX ADMIN — BACITRACIN 1 PACKET: 500 OINTMENT TOPICAL at 11:58

## 2017-10-16 RX ADMIN — FAMOTIDINE 20 MG: 20 TABLET ORAL at 08:57

## 2017-10-16 RX ADMIN — SULFAMETHOXAZOLE AND TRIMETHOPRIM 1 TABLET: 800; 160 TABLET ORAL at 08:57

## 2017-10-16 RX ADMIN — SIMETHICONE CHEW TAB 80 MG 80 MG: 80 TABLET ORAL at 13:14

## 2017-10-16 RX ADMIN — HYDROCODONE BITARTRATE AND ACETAMINOPHEN 1 TABLET: 5; 325 TABLET ORAL at 13:14

## 2017-10-16 RX ADMIN — DIPHENHYDRAMINE HYDROCHLORIDE 12.5 MG: 50 INJECTION, SOLUTION INTRAMUSCULAR; INTRAVENOUS at 09:16

## 2017-10-16 RX ADMIN — ENOXAPARIN SODIUM 40 MG: 40 INJECTION SUBCUTANEOUS at 08:57

## 2017-10-16 RX ADMIN — GABAPENTIN 900 MG: 300 CAPSULE ORAL at 08:56

## 2017-10-16 RX ADMIN — LISINOPRIL 5 MG: 5 TABLET ORAL at 08:56

## 2017-10-16 RX ADMIN — AMIODARONE HYDROCHLORIDE 200 MG: 200 TABLET ORAL at 08:56

## 2017-10-16 NOTE — PROGRESS NOTES
This note will not be viewable in [address] [facility] patient DC from planned surgical Whipple procedure  Today 10/06      Pausing [facility] for BARON PARRY for 30 day oversight of surgical discharge. [place] NOTE:     Mr. Cristina/  Mrs Larissa Mcknight  has been contacted regarding recent surgical inpatient stay for Whipple procedure 10/06 DC today 10/16. Mr. Larissa Mcknigth previously gave me permission to speak with his wife after discharge. She verified  and address for HIPAA security. Explained role and verified PCP. Discharge medications were reviewed with the patient 's wife by telephone. * Reviewed hospital course with Mrs. Cristina. Old a-fib now back s/p ablation years ago. Cardiology hopes this is stress induced and amioderone will wean off.      - Mrs. Cristina has BP cuff and stethoscope to monitor/ he is asymptomatic if he goes into RVR. * One of 10 lymph nodes was positive. So hopeful that chemotherapy will stop progression onf disease. * He has been told by all that he is not to lift \"anything heavier than a toothbrush. \"    * Wife will be home/ no home health orders/  No DC planning found in [facility]. * Discussed priority for cardiology FU and medical oncology. - Frustration with others in her community telling them not to use chemotherapy. - Supported her fortitude and willingness to listen to 'well meaning' friends. * Invited her to call me with any concerns/ or issues to reinforce. - Discussed \"wife rule\" that even if his wife is a nurse, he will listen better to others. Date of IP Admission:    10/06 - 10/16   Facility patient visited:  [facility]   Reason for Visit:   Cancer Cholangiocarcinoma / common bile duct   Reviewed scripts given by ED? Yes      Able to obtain prescribed medication? Yes - wife is RN/ very comfortable with meds. How is the patient feeling? Mr. Larissa Mcknight is very tired/ asymptomatic with afib/ but this has \"really taken a toll on him. \"     Does the patient have any questions or problems? Not at this time   Any barriers with transportation? No- not traveling now/  Wife will transport   Follow-up Appointment date: Yes / cardiology/ oncology/ surgical fu/ new PCP      Reviewed Discharge instructions & Red Flags:  Per AVS.   Wife is RN and well aware of any complications/ red flags. Surgical sites just had CHRIS removed. They have my name and contact information and instructed patient if there are any question to call. Advised that other people are on our team and follow up calls may be with myself or other staff. No further needs at this time. Next call: Wed 10/18. CHART REVIEW    Procedure(s):                         10/6/2017 - Procedure(s):  [facility] Course: The patient was admitted on the day of his surgery. For details regarding his operation, please see separate operative note. His post-op course was complicated by development of Afib with RVR on POD#3 which required management in the ICU with IV medications. This stabilized over the course of several days and he converted back to sinus rhythm on PO meds. He also had a leukocytosis and fevers post-op. CT scans showed a fluid collection consistent with a small pancreatic leak that was stable between scans. This was managed with antibiotics as it was not in a location that was very amenable to percutaneous drainage. His surgical drains were both removed prior to discharge as the drain lipase levels were normal prior to removal.  He was tolerating a diet, having normal bowel function, afebrile, ambulating and otherwise doing well at the time of discharge. He did have some mild erythema and minimal drainage from his incision but this was improving with antibiotics at the time of his discharge and did not require opening of his incision.   I will plan to see the patient back for short interval follow up in the clinic as he does still have a leukocytosis (but with normal bands and afebrile) but will keep him on PO antibiotics at discharge. 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 START taking these medications     Details   amiodarone (CORDARONE) 200 mg tablet Take 1 Tab by mouth two (2) times a day. Indications: PREVENTION OF RECURRENT ATRIAL FIBRILLATION  Qty: 60 Tab, Refills: 3       docusate sodium (COLACE) 100 mg capsule Take 1 Cap by mouth two (2) times daily as needed for Constipation. Qty: 60 Cap, Refills: 0       HYDROcodone-acetaminophen (NORCO) 5-325 mg per tablet Take 1-2 Tabs by mouth every four (4) hours as needed. Max Daily Amount: 12 Tabs. Qty: 50 Tab, Refills: 0       trimethoprim-sulfamethoxazole (BACTRIM DS, SEPTRA DS) 160-800 mg per tablet Take 1 Tab by mouth every twelve (12) hours for 10 days. Qty: 20 Tab, Refills: 0           Activity: No driving while on analgesics and No heavy lifting or strenuous exercise for 6 weeks after surgery  Diet: Regular Diet  Wound Care: Keep wound clean and dry. You may cover with a dry gauze if some drainage persists.

## 2017-10-16 NOTE — DISCHARGE SUMMARY
Physician Discharge Summary     Patient ID:  Refugio Guillermo  [id number]  70 y.o.  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 For details regarding his operation, please see separate operative note. His post-op course was complicated by development of Afib with RVR on POD#3 which required management in the ICU with IV medications. This stabilized over the course of several days and he converted back to sinus rhythm on PO meds. He also had a leukocytosis and fevers post-op. CT scans showed a fluid collection consistent with a small pancreatic leak that was stable between scans. This was managed with antibiotics as it was not in a location that was very amenable to percutaneous drainage. His surgical drains were both removed prior to discharge as the drain lipase levels were normal prior to removal.  He was tolerating a diet, having normal bowel function, afebrile, ambulating and otherwise doing well at the time of discharge. He did have some mild erythema and minimal drainage from his incision but this was improving with antibiotics at the time of his discharge and did not require opening of his incision. I will plan to see the patient back for short interval follow up in the clinic as he does still have a leukocytosis (but with normal bands and afebrile) but will keep him on PO antibiotics at discharge. 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      Consults: Cardiology    Significant Diagnostic Studies: CT A/P x 2. Disposition: home    Patient Instructions:   Current Discharge Medication List      START taking these medications    Details   amiodarone (CORDARONE) 200 mg tablet Take 1 Tab by mouth two (2) times a day. Indications: PREVENTION OF RECURRENT ATRIAL FIBRILLATION  Qty: 60 Tab, Refills: 3      docusate sodium (COLACE) 100 mg capsule Take 1 Cap by mouth two (2) times daily as needed for Constipation. Qty: 60 Cap, Refills: 0      HYDROcodone-acetaminophen (NORCO) 5-325 mg per tablet Take 1-2 Tabs by mouth every four (4) hours as needed. Max Daily Amount: 12 Tabs. Qty: 50 Tab, Refills: 0      trimethoprim-sulfamethoxazole (BACTRIM DS, SEPTRA DS) 160-800 mg per tablet Take 1 Tab by mouth every twelve (12) hours for 10 days. Qty: 20 Tab, Refills: 0         CONTINUE these medications which have NOT CHANGED    Details   calcium-cholecalciferol, d3, (CALCIUM 600 + D) 600-125 mg-unit tab Take  by mouth daily. magnesium 200 mg tab Take  by mouth daily. cetirizine (ZYRTEC) 10 mg tablet Take  by mouth nightly. Indications: AUTOIMMUNE DISORDER, SJOGRENS.      ramipril (ALTACE) 10 mg capsule Take 10 mg by mouth nightly.      gabapentin (NEURONTIN) 300 mg capsule Take 900 mg by mouth two (2) times a day. 3 x 300mg caps (900mg) twice daily      aspirin 81 mg chewable tablet Take 81 mg by mouth nightly. cholecalciferol (VITAMIN D3) 1,000 unit cap Take 5,000 Units by mouth daily. cyanocobalamin (VITAMIN B-12) 1,000 mcg/mL injection 1,000 mcg by IntraMUSCular route once. TWICE PER MONTH, 1ST AND 15TH. Indications: VITAMIN B12 DEFICIENCY, Twice a month             Activity: No driving while on analgesics and No heavy lifting or strenuous exercise for 6 weeks after surgery  Diet: Regular Diet  Wound Care: Keep wound clean and dry. You may cover with a dry gauze if some drainage persists. Follow-up with Maya Valdez MD on 10/19/17.     Follow-up tests/labs - Will order CBC and CMP on the day of your follow up appointment.       Signed:  Howie Baldwin MD  10/16/2017  10:27 AM

## 2017-10-16 NOTE — DISCHARGE INSTRUCTIONS
Patient Discharge Instructions    Talya Mcdaniel / [id number] : 1955    Admitted 10/6/2017 Discharged: 10/16/2017       · It is important that you take the medication exactly as they are prescribed. · Keep your medication in the bottles provided by the pharmacist and keep a list of the medication names, dosages, and times to be taken in your wallet. · Do not take other medications without consulting your doctor. What to do at Home    Recommended diet: Regular diet as tolerated. Be sure to drink plenty of fluids throughout the day to avoid dehydration. Recommended activity: No Heavy Lifting (Less Than 10-15 Pounds) for 6 weeks after surgery. No Driving While Taking narcotic pain medications. May Take Shower when you go home. No submerging your incisions under water until cleared by your surgeon. If you experience any of the following symptoms Fevers, Chills, Nausea, Vomitting, worsening Redness or Drainage at Surgical Site(s) or Any Other Questions or Concerns Please Call -  [phone]. Follow-up with Dr. Yolanda Thompson on 10/19/17. Information obtained by :  I understand that if any problems occur once I am at home I am to contact my physician. I understand and acknowledge receipt of the instructions indicated above.                                                                                                                                            Physician's or R.N.'s Signature                                                                  Date/Time                                                                                                                                              Patient or Representative Signature                                                          Date/Time

## 2017-10-16 NOTE — PROGRESS NOTES
Bedside shift change report given to Joseluis Patel RN (oncoming nurse) by Roshan Clay (offgoing nurse). Report included the following information SBAR, Kardex and MAR.

## 2017-10-16 NOTE — PROGRESS NOTES
Bedside shift change report given to Marilee Richard RN (oncoming nurse) by Gloria Smith RN (offgoing nurse). Report included the following information SBAR, Kardex, Intake/Output, MAR and Recent Results.

## 2017-10-17 NOTE — ADT AUTH CERT NOTES
Patient Demographics        Patient Name [address] Sex  Address Phone       Lencho Gomez [phone] Male 1955 [address] [phone] (Home)  [phone] (Mobile) *Preferred*           CSN:       [id number]           Admit Date: Admit Time Room Bed       Oct 6, 2017  6:18  [[id number]] 01 [[id number]]           Attending Providers        Provider Pager From To       Azeem Felder MD  10/06/17 10/16/17           Emergency Contact(s)        Name Relation Home Work Mobile       Hyun Cristina Spouse [phone]         Roberto Cristina Child [phone]         Ridge Cristina III Child [phone]           Utilization Review           General Surgery or Procedure GRG - Care Day 6 (10/11/2017) by Sandee Kay RN        Review Entered Review Status       10/12/2017 Completed       Details              Care Day: 6 Care Date: 10/11/2017 Level of Care: ICU       Guideline Day 2        Clinical Status       ( ) * No ICU or intermediate care needs              Interventions       (X) Inpatient interventions continue              10/12/2017 3:16 PM EDT by Marc Glynn       Subject: Additional Clinical Information       97.7, , R 21, 122/98, 98% 2LNC.     MEDS: TYLENOL PO, NS 75ML/HR IV, CORDARONE IV TITRATE, CORDARONE PO, COLACE PO, LOVENOX SC, NEURONTIN PO, HUMALOG SS SC, DILAUDID PCA IV, MERREM IV, KLORCON PO, POT PHOSPHATE IV, LASIX 20M IV, MAG SULFATE IV.   LABS: WBC 16.1, RBC 3.38, HGB 10.2, HCT 29.8, NEUTRO 80, LYMPH 4, , K 3.4, CHLOR 93, CREAT 0.67, BILI 1.2, ALB 1.8, CA 7.6, PHOS 2.5. NPO.   CHEST: IMPRESSION: PICC line satisfactory position without pneumothorax.                                   * Milestone              Additional Notes       CARDIO:       Pt states his abd pain is better.       Remains in afib but rate control improved               Imp:       S/P Whipple for cholangiocarcinoma-no fever but WBC up       Pafib       Hyponatremic/hypokalemic     HTN       Sjogren's                        Rec:       Remains in afib but rate controlled       Would wean diltiazem as tolerated. Will convert amio drip to po tomorrow if trend continues.        Will need to decide about anticoagulation down the road-not a candidate at present.                PULMONARY:       IMPRESSION:       1. S/p pylorus sparing Whipple 10/7 for distal cholangiocarcinoma        2. PAF/RVR- pt with h/o Afib and per his report underwent surgical ablation 10 yrs ago. Presently back in PAF/RVR likely due to LA stretch from post op fluid shifts ( often max at day 2-3 post op)- converted to NSR       3. AMS- suspect metabolic toxic-better       4. Hyponatremia- suspect hypervolemic hyponatremia       5. Fever/leukocytosis- ? Evolving abd collection. CT abd with 9 cm collection right anterior pararenal space. Not in vicinity of drains. On meropenem. ESBL Klebsiella on liver tissue cx       6. Sjogren's       7. HTN               Clinical Overview and Plan:       \" dilt drip/amio drip       \" Continue diuresis       \" Clinically better but WBC climbing . Defer to surgery regarding drain of right pararenal fluid collection.        More lasix       Nutrition       SUP       DVT proph                      SURGERY:               Patient afebrile yesterday.  Feels a little better today with better pain control.  Still having mostly RLQ pain and lower abdominal discomfort.  No n/v.  Passing flatus but distended and without stool.  HR fluctuating still but in 110's this morning.  Confusion a little better this morning.          Assessment                Miller Sharpe is a 65 y. o.yr old male s/p whipple for distal cholangiocarcinoma.  Pathology pending.                Plan                N: Pain control improved.  Continue pca, tylenol.         CV: Appreciate Cardiology input.  On Amiodarone, cardizem and digoxin.  Remains tachycardic.         P: Continue pulmonary toilet, IS.  Will another dose of diuresis today.        GI: Passing flatus but no stool still.  Will leave on full liquids.  Drains serosanguinous.  CT yesterday showed small collection near pancreatic anastomosis.  I am planning to watch this for now and will hold off on additional drain as long as he is not having futher clinical deterioration.  Will leave current drains in place to bulb suction.  On pepcid for GI proph.  ISS for any hyperglycemia.         R: Hyponatremic, hypochloremic, hypokalemic and hypophosphatemia.  Will replace K, Phos today. IVF on low rate NS.  Giving additional diuresis today.         H/ID: WBC up again some today.  Hgb stable.  Will keep on meropenem.         MSK: DVT proph with lovenox.  Continue SCD's.         Dispo: Will keep in ICU for now.           General Surgery or Procedure GRG - Care Day 5 (10/10/2017) by Bobby Bradley RN        Review Entered Review Status       10/10/2017 Completed       Details              Care Day: 5 Care Date: 10/10/2017 Level of Care: ICU       Guideline Day 2        Clinical Status       ( ) * No ICU or intermediate care needs              Interventions       (X) Inpatient interventions continue              10/10/2017 2:18 PM EDT by [facility]       Subject: Additional Clinical Information       98.3, , R 20, 108/66, 96% 2LNC.     MEDS: NS 75ML/HR IV, TYLENOL PO, CORDARONE IV TITRATE, CARDIZEM IV TITRATE, LOVENOX SC, PEPCID IV, NEURONTIN PO, DILAUDID IV PCA, HUMALOG SS SC, MERREM IV, POT PHOSPHATE IV, LANOXIN IV, LASIX 20MG IV, MAG SULFATE 2G IV, POT CHLORIDE 10MEQ IV X2.   FULL LIQUID DIET, EKG, ABD CT ORDERED.     LABS: WBC 12.7, RBC 3.24, HGB 10, HCT 29, , , NEUTRO 78, , K 3.2, CHLOR 91,   CA 7.5, BILI 1.5, PHOS 1.8                                   * Milestone              Additional Notes       Surgery:       Subjective                Patient remained tachycardic overnight with rates ranging from 110's to 150's.  Febrile yesterday to 101.4.  Patient complaining of more pain in RLQ near drains today.  No n/v.  He is belching but also passing lots of flatus.  No stool or emesis.  Has some confusion.          Assessment                Navid Snyder is a 65 y. o.yr old male s/p whipple for distal cholangiocarcinoma.  Pathology pending.                Plan                N: Pain control has been poor since removal of epidural.  This may be contributing to his tachycardia.  Adding basal to PCA and schedule tylenol suppositories.  Will follow mental status.         CV: Appreciate Cardiology input.  On Amiodarone, cardizem and digoxin.  Appears to be in A flutter.       P: Continue pulmonary toilet, IS.  Will give additional diuresis today.        GI: Passing flatus but no stool yet.  Will leave on full liquids today.  Drains with mildly elevated lipase levels consistent with small pancreatic leak.  Will leave drains in place to bulb suction.  CT A/P planned for today.  On pepcid for GI proph.  ISS for any hyperglycemia.         R: Hyponatremic, hypochloremic, hypokalemic, hypophosphatemia and hypomagnesemia.  Will replace K, Phos and Mag today. IVF changed to NS.  This is likely from fluid mobilization after surgery. Jeffry Pillion additional diuresis today.         H/ID: WBC up some today.  Hgb stable.  Will keep on meropenem for now.  F/u CT A/P.         MSK: changing DVT proph to lovenox.  Continue SCD's.         Dispo: Will keep in ICU for now.                CARDIO:       Imp:       S/P Whipple with fever and abd pain       Pafib       Hyponatremic/hypokalemic       HTN       Sjogren's       Cholangiocarcinoma               Rec:       Will review EKG to try to determine rhythm.       In short term, would continue the cardizem and amiodarone; pain is definitely driving the heart rate.        Correct electrolytes.              PULMONARY:               IMPRESSION:       1. S/p pylorus sparing Whipple 10/7 for distal cholangiocarcinoma        2.  PAF/RVR- pt with h/o Afib and per his report underwent surgical ablation 10 yrs ago. Presently back in PAF/RVR likely due to LA stretch from post op fluid shifts ( often max at day 2-3 post op)       3. AMS- suspect metabolic toxic       4. Hyponatremia- suspect hypervolemic hyponatremia       5. Fever/leukocytosis- ? Evolving abd collection. On meropenem. ESBL Klebsiella on liver tissue cx       6. Sjogren's       7.  HTN               Clinical Overview and Plan:       \" dilt drip/amio drip       \" Continue diuresis       \" Pain control       \" Replace lytes       \" Pancx       \" CT abd pelvis today

## 2017-10-18 ENCOUNTER — PATIENT OUTREACH (OUTPATIENT)
Dept: OTHER | Age: 62
End: 2017-10-18

## 2017-10-18 NOTE — PROGRESS NOTES
[facility] follow up for [facility] patient  . Patient with Inpatient stay for planned Whipple surgical procedure - DC 10/16. Chart review:  No new documentation    Contacted patient's family for transitions of care services. Fist tried patient's number - went to voice mail. - Wife has returned to work/  Son answered - previous permission given to speak with family during this postoperative period. Verified  and address for HIPAA security. * Mr. David Teixeira is stable at home. [place]/ aide/  is staying with him right now. 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 \"    * Eating soft diet/ trying to push fluids. No acute problems. * Pain managed with medications. Shared my contact information to call if any needs arise. Will follow for [facility] services.   Next check in for post op recovery one week:  10/25

## 2017-10-19 ENCOUNTER — OFFICE VISIT (OUTPATIENT)
Dept: SURGERY | Age: 62
End: 2017-10-19

## 2017-10-19 VITALS
BODY MASS INDEX: 28.04 KG/M2 | SYSTOLIC BLOOD PRESSURE: 130 MMHG | WEIGHT: 185 LBS | RESPIRATION RATE: 18 BRPM | HEIGHT: 68 IN | TEMPERATURE: 98.5 F | OXYGEN SATURATION: 96 % | DIASTOLIC BLOOD PRESSURE: 72 MMHG | HEART RATE: 86 BPM

## 2017-10-19 DIAGNOSIS — C22.1 CHOLANGIOCARCINOMA (HCC): Primary | ICD-10-CM

## 2017-10-19 NOTE — MR AVS SNAPSHOT
Visit Information Date & Time Provider Department Dept. Phone Encounter #  
 10/19/2017  2:15 PM Richi Sommers, [address] [phone] [id number] Follow-up Instructions Return in about 2 weeks (around 11/2/2017). Routing History Follow-up and Disposition History Your Appointments 10/26/2017 11:00 AM  
PHYSICAL PRE OP with Romayne Mosses III, DO [facility] [address]) Appt Note: np est pcp; phone attempt to r/s appt. Zebedee Pulling Full/PJ 8/2/17; np est pcp; Appt reminder attempt Zebedee Pulling Full/PJ 8/30/17; New pt est PCP, CP $10.00 CA, MRM 09/06/07; New pt est PCP, CP $10.00//r/s from 10/16/17--BAM-9/14/17  
 [facility] [address] [facility]  
[phone]  
  
   
 [facility] [address] [facility] [address] [facility]  
  
    
 11/9/2017  3:30 PM  
New Patient with April Fernandez MD  
[address] Oncology at [facility]) Appt Note: NP, f/u from [place] and tumor [address] [place] [address] [address] [id number]  
[address] [place] [address] [address] [facility] [address] Jose Balderas Upcoming Health Maintenance Date Due Hepatitis C Screening 1955 Pneumococcal 19-64 Highest Risk (1 of 3 - PCV13) 9/26/1975 DTaP/Tdap/Td series (1 - Tdap) 9/26/1977 FOBT Q 1 YEAR AGE 50-75 9/26/2006 ZOSTER VACCINE AGE 60> 7/26/2016 INFLUENZA AGE 9 TO ADULT 8/1/2017 Allergies as of 10/19/2017  Review Complete On: 10/19/2017 By: Carolina Campuzano LPN Severity Noted Reaction Type Reactions Other Food High 09/29/2017    Anaphylaxis SOFT SHELL CRABS Pcn [Penicillins]  07/23/2017    Other (comments) Pt stated \"always been told PCN\" Current Immunizations  Never Reviewed No immunizations on file. Not reviewed this visit You Were Diagnosed With   
  
 Codes Comments Cholangiocarcinoma ([facility]    -  Primary ICD-10-CM: C22.1 ICD-9-CM: 155.1 Vitals BP Pulse Temp Resp Height(growth percentile) Weight(growth percentile) 130/72 (BP 1 Location: Right arm, BP Patient Position: Sitting) 86 98.5 °F (36.9 °C) (Oral) 18 5' 8\" (1.727 m) 185 lb (83.9 kg) SpO2 BMI Smoking Status 96% 28.13 kg/m2 Never Smoker Vitals History BMI and BSA Data Body Mass Index Body Surface Area  
 28.13 kg/m 2 2.01 m 2 Preferred Pharmacy Pharmacy Name Phone [facility] [address] [phone] Your Updated Medication List  
  
   
This list is accurate as of: 10/19/17 11:59 PM.  Always use your most recent med list. ALTACE 10 mg capsule Generic drug:  ramipril Take 10 mg by mouth nightly. amiodarone 200 mg tablet Commonly known as:  CORDARONE Take 1 Tab by mouth two (2) times a day. Indications: PREVENTION OF RECURRENT ATRIAL FIBRILLATION  
  
 aspirin 81 mg chewable tablet Take 81 mg by mouth nightly. CALCIUM 600 + D 600-125 mg-unit Tab Generic drug:  calcium-cholecalciferol (d3) Take  by mouth daily. docusate sodium 100 mg capsule Commonly known as:  Ledell Rosario Take 1 Cap by mouth two (2) times daily as needed for Constipation. gabapentin 300 mg capsule Commonly known as:  NEURONTIN Take 900 mg by mouth two (2) times a day. 3 x 300mg caps (900mg) twice daily HYDROcodone-acetaminophen 5-325 mg per tablet Commonly known as:  Julaine Kava Take 1-2 Tabs by mouth every four (4) hours as needed. Max Daily Amount: 12 Tabs.  
  
 magnesium 200 mg Tab Take  by mouth daily. trimethoprim-sulfamethoxazole 160-800 mg per tablet Commonly known as:  BACTRIM DS, SEPTRA DS Take 1 Tab by mouth every twelve (12) hours for 10 days. VITAMIN B-12 1,000 mcg/mL injection Generic drug:  cyanocobalamin 1,000 mcg by IntraMUSCular route once. TWICE PER MONTH, 1ST AND 15TH. Indications: VITAMIN B12 DEFICIENCY, Twice a month VITAMIN D3 1,000 unit Cap Generic drug:  cholecalciferol Take 5,000 Units by mouth daily. ZyrTEC 10 mg tablet Generic drug:  cetirizine Take  by mouth nightly. Indications: AUTOIMMUNE DISORDER, SJOGRENS. We Performed the Following REFERRAL TO ONCOLOGY [[id number] Custom] Comments:  
 Please evaluate patient for T3N1 cholangiocarcinoma s/p whipple. Follow-up Instructions Return in about 2 weeks (around 11/2/2017). Referral Information Referral ID Referred By Referred To  
  
 [id number] Tenzin MON MD   
   [address] [place] [address] [address] [facility] [address] Phone: [phone] Fax: [phone] Visits Status Start Date End Date 1 Closed 10/19/17 10/19/18 If your referral has a status of pending review or denied, additional information will be sent to support the outcome of this decision. Introducing [facility]! Dear Sakina Mariano: Thank you for requesting a [employer] account. Our records indicate that you already have an active [employer] account. You can access your account anytime at [email or web address] Did you know that you can access your hospital and ER discharge instructions at any time in [employer]? You can also review all of your test results from your hospital stay or ER visit. Additional Information If you have questions, please visit the Frequently Asked Questions section of the [employer] website at [email or web address] Remember, [employer] is NOT to be used for urgent needs. For medical emergencies, dial 911. Now available from your iPhone and Android! Please provide this summary of care documentation to your next provider. Your primary care clinician is listed as NONE.  If you have any questions after today's visit, please call [phone].

## 2017-10-25 ENCOUNTER — PATIENT OUTREACH (OUTPATIENT)
Dept: OTHER | Age: 62
End: 2017-10-25

## 2017-10-25 NOTE — PROGRESS NOTES
[address] [facility]      Clinic Note - Follow up    Subjective     Clarke Latham returns for scheduled follow up today. 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 He has an episode of a-fib during his post-operative course that is now resolved but otherwise had an uneventful post-operative course. He has been doing well since discharge. He had some cellulitis and a small amount of incisional drainage that has been improving on oral antibiotics. He states this continues to improve. He has not had any fevers or other complaints since discharge. His incisional pain is well controlled, he is eating well without n/v and is having normal bowel movements. He did not require insulin post-operatively and has not been having any diarrhea to suggest exocrine insufficiency. He has no complaints today. Objective     Visit Vitals    /72 (BP 1 Location: Right arm, BP Patient Position: Sitting)    Pulse 86    Temp 98.5 °F (36.9 °C) (Oral)    Resp 18    Ht 5' 8\" (1.727 m)    Wt 185 lb (83.9 kg)    SpO2 96%    BMI 28.13 kg/m2         PE  GEN - Awake, alert, communicating appropriately. NAD  Pulm - CTAB  CV - RRR  Abd - soft, ND. Incision with minimal, non-purulent drainage noted. Mild surrounding erythema has continued to improve.  + BS. Ext - warm, well perfused. Labs  None    Assessment     Clarke Latham is a 65 y. o.yr old male with pT3pN1 cholangiocarcinoma s/p pylorus preserving whipple on 10/6/17. He is recovering well from surgery. Plan     -I would like to see him back in 2 weeks to ensure his wound continues to improve. He will complete a 10 day course of bactrim for this.    -His drains were all removed in the hospital.  He had a small fluid collection on post-op CT but this appears stable.    -I will plan to set the patient up for port placement but would like for him to be done with antibiotics and ensure that his wound is does not require any additional treatment/intervention before placing this.    -The patient will see Dr. Stephani Degroot to discuss adjuvant chemotherapy  -He will set up an appointment with his Cardiologist to discuss weaning off of amiodarone.       Jazmine Cox MD  10/19/2017    CC: Dr. Naveed Medina

## 2017-10-25 NOTE — PROGRESS NOTES
[facility] follow up -  [facility] patient . Patient with Inpatient stay DC 10/16    Chart review:  FU apt 10/20 with only nursing check in. Contacted patient for transitions of care services. Verified  and address for HIPAA security. * Mr. Danny Morin reports that his surgery site is not fully healed. - Redness around area -  has seen on [address] 10/20   - Remains on antibioitcs - last dose tomorrow   - Surgery FU visit for tomorrow. - No fever, chills - reddened area is not expanding.     - His wife is a RN - dressing the wound daily with triple abx ointment. * Anxious about upcoming apt tomorrow. - No mention of port-a-cath insertion planned on  - as seen in [facility] with outgoing letters. * He is outside with two grandchildren under 10years old. - Not lifting/ pulling or bending.     - His son is 'working the farm\"   Patient states, \"I just tell them what they are doing wrong. \"     - Discussed difficulty with working as his main way to deal with stress. - Encouraged walks and enjoying the day. - Offered support / empathy on difficulty waiting;  Healing is most often the most difficult when you feel like you should be working/ doing more - but your body isn't ready.     - Anxiety normal with unknown future treatments. \"I'll just deal with what comes. \"     * He requests a call after the next doctor's apt. Will follow for [facility] services. Next call Friday 10/27.

## 2017-10-26 ENCOUNTER — OFFICE VISIT (OUTPATIENT)
Dept: INTERNAL MEDICINE CLINIC | Age: 62
End: 2017-10-26

## 2017-10-26 VITALS
WEIGHT: 178 LBS | HEIGHT: 68 IN | TEMPERATURE: 98.5 F | DIASTOLIC BLOOD PRESSURE: 71 MMHG | SYSTOLIC BLOOD PRESSURE: 116 MMHG | BODY MASS INDEX: 26.98 KG/M2 | RESPIRATION RATE: 16 BRPM | HEART RATE: 80 BPM | OXYGEN SATURATION: 97 %

## 2017-10-26 DIAGNOSIS — I10 ESSENTIAL HYPERTENSION: Chronic | ICD-10-CM

## 2017-10-26 DIAGNOSIS — M35.00 SJOGREN'S SYNDROME, WITH UNSPECIFIED ORGAN INVOLVEMENT (HCC): Chronic | ICD-10-CM

## 2017-10-26 DIAGNOSIS — E53.8 LOW VITAMIN B12 LEVEL: Chronic | ICD-10-CM

## 2017-10-26 DIAGNOSIS — C22.1 CHOLANGIOCARCINOMA OF BILIARY TRACT (HCC): Primary | ICD-10-CM

## 2017-10-26 DIAGNOSIS — R79.89 LOW VITAMIN D LEVEL: Chronic | ICD-10-CM

## 2017-10-26 DIAGNOSIS — Z23 ENCOUNTER FOR IMMUNIZATION: ICD-10-CM

## 2017-10-26 DIAGNOSIS — I48.0 PAROXYSMAL ATRIAL FIBRILLATION (HCC): Chronic | ICD-10-CM

## 2017-10-26 PROBLEM — Z86.79 S/P ABLATION OF ATRIAL FIBRILLATION: Chronic | Status: ACTIVE | Noted: 2017-10-26

## 2017-10-26 PROBLEM — R25.2 CRAMPS, MUSCLE, GENERAL: Status: ACTIVE | Noted: 2017-10-26

## 2017-10-26 PROBLEM — Z98.890 S/P ABLATION OF ATRIAL FIBRILLATION: Chronic | Status: ACTIVE | Noted: 2017-10-26

## 2017-10-26 RX ORDER — RAMIPRIL 10 MG/1
10 CAPSULE ORAL
Qty: 90 CAP | Refills: 3 | Status: SHIPPED | OUTPATIENT
Start: 2017-10-26 | End: 2019-01-04 | Stop reason: SDUPTHER

## 2017-10-26 RX ORDER — GABAPENTIN 300 MG/1
900 CAPSULE ORAL 2 TIMES DAILY
Qty: 540 CAP | Refills: 3 | Status: ON HOLD | OUTPATIENT
Start: 2017-10-26 | End: 2018-08-17 | Stop reason: DRUGHIGH

## 2017-10-26 RX ORDER — CYANOCOBALAMIN 1000 UG/ML
1000 INJECTION, SOLUTION INTRAMUSCULAR; SUBCUTANEOUS ONCE
Qty: 1 VIAL | Refills: 12
Start: 2017-10-26 | End: 2017-10-26

## 2017-10-26 NOTE — PATIENT INSTRUCTIONS
Sjogren's Syndrome: Care Instructions  Your Care Instructions    Sjögren's syndrome (say \"show-grins\") causes your body's defense, or immune, system to attack the glands that make moisture. The condition affects your tear and saliva glands and sometimes other parts of your body. Your eyes and mouth get very dry. Sjögren's also may cause you to be very tired and to have pain in your joints. A small number of people may have problems with their lungs, kidneys, and nerves. Even though there is no cure for Sjögren's, you can treat the symptoms. You can use artificial tears to keep your eyes moist. Saliva substitutes, water, or prescription medicines can help keep your mouth and throat moist.  Follow-up care is a key part of your treatment and safety. Be sure to make and go to all appointments, and call your doctor if you are having problems. It's also a good idea to know your test results and keep a list of the medicines you take. How can you care for yourself at home? For your eyes  · Use artificial tears during the day. They come in different brands, so if one type does not help, try another. Try to use preservative-free drops. They may be easier on your eyes. · Use lubricating ointment at night. It is thicker and lasts longer than artificial tears, so you have less burning, dryness, and itching when you wake up in the morning. The ointment may blur your vision for a short time, so use it before going to bed. · Wear wraparound sunglasses to protect your eyes from wind and dust.  · Avoid smoke. It irritates your eyes. · Keep makeup away from your eyes or you may want to avoid eye makeup. For your mouth  · Drink fluids during the day to keep your mouth moist. Try drinking small sips of water and rinsing your mouth a lot. · Use mouthwash or spray to keep your mouth wet. · Brush your teeth with fluoride toothpaste two times a day and after meals, and floss your teeth every day.   · Visit the dentist two times a year, or more if needed, to prevent and treat tooth decay. · Use sugar-free gum or candies such as lemon drops. They increase saliva. (Sugar can increase your risk for cavities and yeast infections.)  · Avoid over-the-counter medicines that can cause dryness. These include antihistamines, such as Benadryl or Chlor-Trimeton. For other parts of your body  · Take anti-inflammatory medicines if you have joint pain and swelling. These include ibuprofen (Advil, Motrin) and naproxen (Aleve). Read and follow all instructions on the label. · Use moisturizing skin creams or ointments during the day. · Use only moisturizing soaps while bathing. After bathing, apply skin creams or ointments. · Always wear sunscreen on exposed skin. Make sure to use a broad-spectrum sunscreen that has a sun protection factor (SPF) of 30 or higher. Use it every day, even when it is cloudy. · Use a vaporizer or humidifier to add moisture to your bedroom. Follow the directions for cleaning the machine. · Use saline (saltwater) nasal washes to help keep your nasal passages open and to wash out mucus and bacteria. You can buy saline nose drops at a grocery store or pharmacy. Or you can make your own at home by adding 1 teaspoon of salt and 1 teaspoon of baking soda to 2 cups of distilled water. If you make your own, fill a bulb syringe with the solution, insert the tip into your nostril, and squeeze gently. Lorrin Fraction your nose. · Use lubricants if you have vaginal dryness. · Take an over-the-counter antacid or acid reducer, such as Pepcid AC or Zantac 75, when needed to reduce heartburn. Be careful when you take over-the-counter antacid medicines. Many of these medicines have aspirin in them. Read the label to make sure that you are not taking more than the recommended dose. Too much aspirin can be harmful. When should you call for help?   Watch closely for changes in your health, and be sure to contact your doctor if:  ? · Your eyes and mouth are still very dry even with home care. ? · Your joint pain does not get better with over-the-counter medicine. ? · Your tiredness gets worse or makes it hard to do daily activities. Where can you learn more? Go to http://sandra-jenelle.info/. Enter D040 in the search box to learn more about \"Sjogren's Syndrome: Care Instructions. \"  Current as of: October 31, 2016  Content Version: 11.4  © 6561-2983 [employer]. Care instructions adapted under license by [employer] (which disclaims liability or warranty for this information). If you have questions about a medical condition or this instruction, always ask your healthcare professional. [address] any warranty or liability for your use of this information.

## 2017-10-26 NOTE — PROGRESS NOTES
Johanna Mcmanus is a 65 y.o. male who presents for evaluation of hospital follow up/new pt visit. Old pcp in [place]. Numerous hospital stays over past few months, [facility] July 23-26 with obstructive jaundice, had ercp and stent. inpt stm from aug 24-31 with ascending cholantigitis. inpt stm from oct 6-16 with whipple procedure for cholangiocarcinoma. Set to see oncology next week, will start chemo. All in all feels well, doing well. ROS:  Constitutional: negative for fevers, chills, anorexia and weight loss  Eyes:   negative for visual disturbance and irritation  ENT:   negative for tinnitus,sore throat,nasal congestion,ear pain,hoarseness  Respiratory:  negative for cough, hemoptysis, dyspnea,wheezing  CV:   negative for chest pain, palpitations, lower extremity edema  GI:   negative for nausea, vomiting, diarrhea, abdominal pain,melena  Genitourinary: negative for frequency, dysuria and hematuria  Musculoskel: negative for myalgias, arthralgias, back pain, muscle weakness, joint pain  Neurological:  negative for headaches, dizziness, focal weakness, numbness  Psychiatric:     Negative for depression or anxiety      Past Medical History:   Diagnosis Date    Arrhythmia     Previous a.fib, ablation, NSR now; NO LONGER FOLLOWED BY CARDIOLOGIST.     Autoimmune disease ([address]     SJOGREN'S    Cancer ([address]     COMMON BILE DUCT, ADENOCARCINOMA    Hypertension        Past Surgical History:   Procedure Laterality Date    HX GI      COLONOSCOPY, POLYPS (BENIGN)    HX HEART CATHETERIZATION  2005    Ablation     HX ORTHOPAEDIC  2000    Spinal fusion W/ HARDWARE    NEUROLOGICAL PROCEDURE UNLISTED  2005    [place] hole washout from cerebral hemorrhage       Family History   Problem Relation Age of Onset    Cancer Father      Breast and Colon    Cancer Mother      LEUKEMIA    Anesth Problems Neg Hx        Social History     Social History    Marital status:      Spouse name: N/A    Number of children: N/A    Years of education: N/A     Occupational History    Not on file. Social History Main Topics    Smoking status: Never Smoker    Smokeless tobacco: Never Used    Alcohol use Yes      Comment: RARELY    Drug use: No    Sexual activity: Not on file     Other Topics Concern    Not on file     Social History Narrative            Visit Vitals    /71 (BP 1 Location: Left arm, BP Patient Position: Sitting)    Pulse 80    Temp 98.5 °F (36.9 °C) (Oral)    Resp 16    Ht 5' 7.5\" (1.715 m)    Wt 178 lb (80.7 kg)    SpO2 97%    BMI 27.47 kg/m2       Physical Examination:   General - Well appearing male  HEENT - PERRL, TM no erythema/opacification, normal nasal turbinates, no oropharyngeal erythema or exudate, MMM  Neck - supple, no bruits, no thyroidomegaly, no lymphadenopathy  Pulm - clear to auscultation bilaterally  Cardio - RRR, normal S1 S2, no murmur  Abd - soft, nontender, no masses, no HSM  Extrem - no edema, +2 distal pulses  Neuro-  No focal deficits, CN intact     Assessment/Plan:    1.  htn--controlled with altace  2. pafib--sp ablation. Had reoccurrence while at [facility], now on amio. Follows with dr Erick Gutierrez  3. Hx sjogrens--  4. Low potassium--check bmp, mg  5. Diffuse myalgias--improved since addition of neurontin, continue same  6. Routine adult health maitnenance--prevnar 13 given today. Had colon with dr Keith Mosher in [place].     rtc 6 months        Alan Jay III, DO

## 2017-10-26 NOTE — PROGRESS NOTES
Reviewed record in preparation for visit and have obtained necessary documentation. Identified pt with two pt identifiers(name and ). Chief Complaint   Patient presents with   [facility] Maintenance Due   Topic Date Due    Hepatitis C Screening  1955    Pneumococcal 19-64 Highest Risk (1 of 3 - PCV13) 1975    DTaP/Tdap/Td series (1 - Tdap) 1977    FOBT Q 1 YEAR AGE 50-75  2006    ZOSTER VACCINE AGE 60>  2016    INFLUENZA AGE 9 TO ADULT  2017       Mr. Anastasia Clark has a reminder for a \"due or due soon\" health maintenance. I have asked that he discuss health maintenance topic(s) due with His  primary care provider. Coordination of Care Questionnaire:  :     1) Have you been to an emergency room, urgent care clinic since your last visit? no   Hospitalized since your last visit? no             2) Have you seen or consulted any other health care providers outside of [address] [place] [address] since your last visit? no  (Include any pap smears or colon screenings in this section.)    3) Do you have an Advance Directive on file? no    4) Are you interested in receiving information on Advance Directives? yes    Patient is accompanied by self I have received verbal consent from Talya Mcdaniel to discuss any/all medical information while they are present in the room.

## 2017-10-26 NOTE — MR AVS SNAPSHOT
Visit Information Date & Time Provider Department Dept. Phone Encounter #  
 10/26/2017 11:00 AM Kevin Colunga, [address] [place] [address] [id number] Follow-up Instructions Return in about 6 months (around 4/26/2018). Your Appointments 11/9/2017  3:30 PM  
New Patient with Julio Reid MD  
[address] Oncology at [facility]) Appt Note: NP, f/u from [place] and tumor [address] [facility] [address] [id number]  
[address] [place] [address] [address] Jose Balderas Upcoming Health Maintenance Date Due Hepatitis C Screening 1955 FOBT Q 1 YEAR AGE 50-75 9/26/2006 ZOSTER VACCINE AGE 60> 7/26/2016 Pneumococcal 19-64 Highest Risk (2 of 3 - PCV13) 1/4/2018 DTaP/Tdap/Td series (2 - Td) 1/7/2023 Allergies as of 10/26/2017  Review Complete On: 10/26/2017 By: Bernard Farias III, DO Severity Noted Reaction Type Reactions Other Food High 09/29/2017    Anaphylaxis SOFT SHELL CRABS Pcn [Penicillins]  07/23/2017    Other (comments) Pt stated \"always been told PCN\" Current Immunizations  Never Reviewed Name Date Pneumococcal Polysaccharide (PPSV-23) 1/4/2017 Not reviewed this visit You Were Diagnosed With   
  
 Codes Comments Cholangiocarcinoma of biliary tract ([facility]    -  Primary ICD-10-CM: C24.9 ICD-9-CM: 156.9 Paroxysmal atrial fibrillation (HCC)     ICD-10-CM: I48.0 ICD-9-CM: 427.31 Essential hypertension     ICD-10-CM: I10 
ICD-9-CM: 401.9 Sjogren's syndrome, with unspecified organ involvement ([facility]     ICD-10-CM: M35.00 ICD-9-CM: 710.2 Low vitamin D level     ICD-10-CM: E55.9 ICD-9-CM: 268.9 Low vitamin B12 level     ICD-10-CM: E53.8 ICD-9-CM: 266.2 Vitals BP Pulse Temp Resp Height(growth percentile) Weight(growth percentile) 116/71 (BP 1 Location: Left arm, BP Patient Position: Sitting) 80 98.5 °F (36.9 °C) (Oral) 16 5' 7.5\" (1.715 m) 178 lb (80.7 kg) SpO2 BMI Smoking Status 97% 27.47 kg/m2 Never Smoker Vitals History BMI and BSA Data Body Mass Index Body Surface Area  
 27.47 kg/m 2 1.96 m 2 Preferred Pharmacy Pharmacy Name Phone [facility]/PHARMACY [address] [place] [address] [phone] Your Updated Medication List  
  
   
This list is accurate as of: 10/26/17 12:14 PM.  Always use your most recent med list.  
  
  
  
  
 amiodarone 200 mg tablet Commonly known as:  CORDARONE Take 1 Tab by mouth two (2) times a day. Indications: PREVENTION OF RECURRENT ATRIAL FIBRILLATION  
  
 aspirin 81 mg chewable tablet Take 81 mg by mouth nightly. CALCIUM 600 + D 600-125 mg-unit Tab Generic drug:  calcium-cholecalciferol (d3) Take  by mouth daily. cyanocobalamin 1,000 mcg/mL injection Commonly known as:  VITAMIN B-12  
1 mL by IntraMUSCular route once for 1 dose. TWICE PER MONTH, 1ST AND 15TH. Indications: Vitamin B12 Deficiency, Twice a month  
  
 docusate sodium 100 mg capsule Commonly known as:  Radha Lowers Take 1 Cap by mouth two (2) times daily as needed for Constipation. gabapentin 300 mg capsule Commonly known as:  NEURONTIN Take 3 Caps by mouth two (2) times a day. 3 x 300mg caps (900mg) twice daily HYDROcodone-acetaminophen 5-325 mg per tablet Commonly known as:  Aly Piña Take 1-2 Tabs by mouth every four (4) hours as needed. Max Daily Amount: 12 Tabs.  
  
 magnesium 200 mg Tab Take  by mouth daily. ramipril 10 mg capsule Commonly known as:  ALTACE Take 1 Cap by mouth nightly. trimethoprim-sulfamethoxazole 160-800 mg per tablet Commonly known as:  BACTRIM DS, SEPTRA DS Take 1 Tab by mouth every twelve (12) hours for 10 days. VITAMIN D3 1,000 unit Cap Generic drug:  cholecalciferol Take 5,000 Units by mouth daily. ZyrTEC 10 mg tablet Generic drug:  cetirizine Take  by mouth nightly. Indications: AUTOIMMUNE DISORDER, SJOGRENS. Prescriptions Sent to Pharmacy Refills  
 ramipril (ALTACE) 10 mg capsule 3 Sig: Take 1 Cap by mouth nightly. Class: Normal  
 Pharmacy: [address] [place] [address] Ph #: [phone] Route: Oral  
 gabapentin (NEURONTIN) 300 mg capsule 3 Sig: Take 3 Caps by mouth two (2) times a day. 3 x 300mg caps (900mg) twice daily Class: Normal  
 Pharmacy: [address] [place] [address] Ph #: [phone] Route: Oral  
  
We Performed the Following CBC WITH AUTOMATED DIFF [75591 CPT(R)] HEMOGLOBIN A1C WITH EAG [33999 CPT(R)] HEPATITIS C AB [49657 CPT(R)] MAGNESIUM [id number] CPT(R)] METABOLIC PANEL, BASIC [94403 CPT(R)] PSA, DIAGNOSTIC (PROSTATE SPECIFIC AG) [id number] CPT(R)] VITAMIN B12 [id number] CPT(R)] VITAMIN D, 25 HYDROXY [id number] CPT(R)] Follow-up Instructions Return in about 6 months (around 4/26/2018). Patient Instructions Sjogren's Syndrome: Care Instructions Your Care Instructions Sjögren's syndrome (say \"show-grins\") causes your body's defense, or immune, system to attack the glands that make moisture. The condition affects your tear and saliva glands and sometimes other parts of your body. Your eyes and mouth get very dry. Sjögren's also may cause you to be very tired and to have pain in your joints. A small number of people may have problems with their lungs, kidneys, and nerves. Even though there is no cure for Sjögren's, you can treat the symptoms. You can use artificial tears to keep your eyes moist. Saliva substitutes, water, or prescription medicines can help keep your mouth and throat moist. 
Follow-up care is a key part of your treatment and safety.  Be sure to make and go to all appointments, and call your doctor if you are having problems. It's also a good idea to know your test results and keep a list of the medicines you take. How can you care for yourself at home? For your eyes · Use artificial tears during the day. They come in different brands, so if one type does not help, try another. Try to use preservative-free drops. They may be easier on your eyes. · Use lubricating ointment at night. It is thicker and lasts longer than artificial tears, so you have less burning, dryness, and itching when you wake up in the morning. The ointment may blur your vision for a short time, so use it before going to bed. · Wear wraparound sunglasses to protect your eyes from wind and dust. 
· Avoid smoke. It irritates your eyes. · Keep makeup away from your eyes or you may want to avoid eye makeup. For your mouth · Drink fluids during the day to keep your mouth moist. Try drinking small sips of water and rinsing your mouth a lot. · Use mouthwash or spray to keep your mouth wet. · Brush your teeth with fluoride toothpaste two times a day and after meals, and floss your teeth every day. · Visit the dentist two times a year, or more if needed, to prevent and treat tooth decay. · Use sugar-free gum or candies such as lemon drops. They increase saliva. (Sugar can increase your risk for cavities and yeast infections.) · Avoid over-the-counter medicines that can cause dryness. These include antihistamines, such as Benadryl or Chlor-Trimeton. For other parts of your body · Take anti-inflammatory medicines if you have joint pain and swelling. These include ibuprofen (Advil, Motrin) and naproxen (Aleve). Read and follow all instructions on the label. · Use moisturizing skin creams or ointments during the day. · Use only moisturizing soaps while bathing. After bathing, apply skin creams or ointments. · Always wear sunscreen on exposed skin.  Make sure to use a broad-spectrum sunscreen that has a sun protection factor (SPF) of 30 or higher. Use it every day, even when it is cloudy. · Use a vaporizer or humidifier to add moisture to your bedroom. Follow the directions for cleaning the machine. · Use saline (saltwater) nasal washes to help keep your nasal passages open and to wash out mucus and bacteria. You can buy saline nose drops at a grocery store or pharmacy. Or you can make your own at home by adding 1 teaspoon of salt and 1 teaspoon of baking soda to 2 cups of distilled water. If you make your own, fill a bulb syringe with the solution, insert the tip into your nostril, and squeeze gently. Alford Bees your nose. · Use lubricants if you have vaginal dryness. · Take an over-the-counter antacid or acid reducer, such as Pepcid AC or Zantac 75, when needed to reduce heartburn. Be careful when you take over-the-counter antacid medicines. Many of these medicines have aspirin in them. Read the label to make sure that you are not taking more than the recommended dose. Too much aspirin can be harmful. When should you call for help? Watch closely for changes in your health, and be sure to contact your doctor if: 
? · Your eyes and mouth are still very dry even with home care. ? · Your joint pain does not get better with over-the-counter medicine. ? · Your tiredness gets worse or makes it hard to do daily activities. Where can you learn more? Go to http://sandra-jenelle.info/. Enter D040 in the search box to learn more about \"Sjogren's Syndrome: Care Instructions. \" Current as of: October 31, 2016 Content Version: 11.4 © 5876-4614 [employer]. Care instructions adapted under license by [employer] (which disclaims liability or warranty for this information).  If you have questions about a medical condition or this instruction, always ask your healthcare professional. [employer] disclaims any warranty or liability for your use of this information. Introducing [facility] & HEALTH SERVICES! Dear Brii Ruiz: Thank you for requesting a [employer] account. Our records indicate that you already have an active [employer] account. You can access your account anytime at [email or web address] Did you know that you can access your hospital and ER discharge instructions at any time in [employer]? You can also review all of your test results from your hospital stay or ER visit. Additional Information If you have questions, please visit the Frequently Asked Questions section of the [employer] website at [email or web address] Remember, [employer] is NOT to be used for urgent needs. For medical emergencies, dial 911. Now available from your iPhone and Android! Please provide this summary of care documentation to your next provider. Your primary care clinician is listed as Alberto Lee If you have any questions after today's visit, please call [phone].

## 2017-10-27 ENCOUNTER — PATIENT OUTREACH (OUTPATIENT)
Dept: OTHER | Age: 62
End: 2017-10-27

## 2017-10-27 LAB
25(OH)D3+25(OH)D2 SERPL-MCNC: 49.3 NG/ML (ref 30–100)
BASOPHILS # BLD AUTO: 0.1 X10E3/UL (ref 0–0.2)
BASOPHILS NFR BLD AUTO: 1 %
BUN SERPL-MCNC: 17 MG/DL (ref 8–27)
BUN/CREAT SERPL: 17 (ref 10–24)
CALCIUM SERPL-MCNC: 9.8 MG/DL (ref 8.6–10.2)
CHLORIDE SERPL-SCNC: 98 MMOL/L (ref 96–106)
CO2 SERPL-SCNC: 24 MMOL/L (ref 18–29)
CREAT SERPL-MCNC: 1.02 MG/DL (ref 0.76–1.27)
EOSINOPHIL # BLD AUTO: 0.3 X10E3/UL (ref 0–0.4)
EOSINOPHIL NFR BLD AUTO: 3 %
ERYTHROCYTE [DISTWIDTH] IN BLOOD BY AUTOMATED COUNT: 14.9 % (ref 12.3–15.4)
EST. AVERAGE GLUCOSE BLD GHB EST-MCNC: 126 MG/DL
GFR SERPLBLD CREATININE-BSD FMLA CKD-EPI: 79 ML/MIN/1.73
GFR SERPLBLD CREATININE-BSD FMLA CKD-EPI: 91 ML/MIN/1.73
GLUCOSE SERPL-MCNC: 101 MG/DL (ref 65–99)
HBA1C MFR BLD: 6 % (ref 4.8–5.6)
HCT VFR BLD AUTO: 35.9 % (ref 37.5–51)
HCV AB S/CO SERPL IA: <0.1 S/CO RATIO (ref 0–0.9)
HGB BLD-MCNC: 12.1 G/DL (ref 12.6–17.7)
IMM GRANULOCYTES # BLD: 0.1 X10E3/UL (ref 0–0.1)
IMM GRANULOCYTES NFR BLD: 1 %
LYMPHOCYTES # BLD AUTO: 1.4 X10E3/UL (ref 0.7–3.1)
LYMPHOCYTES NFR BLD AUTO: 14 %
MAGNESIUM SERPL-MCNC: 2.5 MG/DL (ref 1.6–2.3)
MCH RBC QN AUTO: 30.8 PG (ref 26.6–33)
MCHC RBC AUTO-ENTMCNC: 33.7 G/DL (ref 31.5–35.7)
MCV RBC AUTO: 91 FL (ref 79–97)
MONOCYTES # BLD AUTO: 0.7 X10E3/UL (ref 0.1–0.9)
MONOCYTES NFR BLD AUTO: 7 %
NEUTROPHILS # BLD AUTO: 7.4 X10E3/UL (ref 1.4–7)
NEUTROPHILS NFR BLD AUTO: 74 %
PLATELET # BLD AUTO: 456 X10E3/UL (ref 150–379)
POTASSIUM SERPL-SCNC: 4.7 MMOL/L (ref 3.5–5.2)
PSA SERPL-MCNC: 0.4 NG/ML (ref 0–4)
RBC # BLD AUTO: 3.93 X10E6/UL (ref 4.14–5.8)
SODIUM SERPL-SCNC: 136 MMOL/L (ref 134–144)
VIT B12 SERPL-MCNC: 928 PG/ML (ref 211–946)
WBC # BLD AUTO: 9.9 X10E3/UL (ref 3.4–10.8)

## 2017-10-27 NOTE — PROGRESS NOTES
Labs look good. Infection numbers back to normal, hgb/hct also much improved. K and Mg also normal, so need for any supplementation of either. Does have pre/borderline diabetes, a1c 6.0 for average sugar of 126. Work on limiting carbs/starches. Other labs all look good.

## 2017-10-27 NOTE — PROGRESS NOTES
[facility] follow up. - [facility] on hold. Patient with Inpatient stay for Whipple procedure - DC 10/16. Chart review:  PCP apt 10/26;  Lab work pending. Contacted patient for transitions of care services. Verified  and address for HIPAA security. *  reports that he is doing well. * Surgical site is doing well. Still a small reddened area - finished abx yesterday. - Surgeon apt was cancelled due to emergency surgery for another patient. - New FU is Tuesday 10/31. * He attended new PCP apt with Dr. Evgeny Hess at [address] - Blood work taken. 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 - Still not lifting/ moving heavy items. * Port insertion planned for . Will follow for [facility] services. Next call:  11/10 two weeks after port insertion. Chart review:     New PCP apt 10/26    Visit Vitals    /71 (BP 1 Location: Left arm, BP Patient Position: Sitting)    Pulse 80    Temp 98.5 °F (36.9 °C) (Oral)    Resp 16    Ht 5' 7.5\" (1.715 m)    Wt 178 lb (80.7 kg)    SpO2 97%    BMI 27.47 kg/m2      Assessment/Plan:     1.  htn--controlled with altace  2. pafib--sp ablation. Had reoccurrence while at [facility], now on amio. Follows with dr Purnima Nicolas  3. Hx sjogrens--  4. Low potassium--check bmp, mg  5. Diffuse myalgias--improved since addition of neurontin, continue same  6. Routine adult health maitnenance--prevnar 13 given today.   Had colon with dr Shirlyn Snellen in [place].     rtc 6 months

## 2017-10-31 ENCOUNTER — OFFICE VISIT (OUTPATIENT)
Dept: SURGERY | Age: 62
End: 2017-10-31

## 2017-10-31 VITALS
SYSTOLIC BLOOD PRESSURE: 138 MMHG | OXYGEN SATURATION: 97 % | DIASTOLIC BLOOD PRESSURE: 70 MMHG | BODY MASS INDEX: 26.98 KG/M2 | WEIGHT: 178 LBS | RESPIRATION RATE: 18 BRPM | HEIGHT: 68 IN | HEART RATE: 70 BPM | TEMPERATURE: 97.5 F

## 2017-10-31 DIAGNOSIS — C22.1 CHOLANGIOCARCINOMA (HCC): Primary | ICD-10-CM

## 2017-10-31 NOTE — PROGRESS NOTES
1. Have you been to the ER, urgent care clinic since your last visit? Hospitalized since your last visit? No    2. Have you seen or consulted any other health care providers outside of the [address] [place] [address] since your last visit? Include any pap smears or colon screening.  No

## 2017-10-31 NOTE — MR AVS SNAPSHOT
Visit Information Date & Time Provider Department Dept. Phone Encounter #  
 10/31/2017  2:00 PM Petra Mcfarlane, [address] [phone] [id number] Follow-up Instructions Routing History Your Appointments 11/9/2017  3:30 PM  
New Patient with Natasha Spring MD  
[address] Oncology at [facility]) Appt Note: NP, f/u from [place] and tumor [address] [id number]  
[address] [place] [address] [address] [facility] [address] Jose Balderas Upcoming Health Maintenance Date Due COLONOSCOPY 1/24/2011 Pneumococcal 19-64 Highest Risk (3 of 3 - PPSV23) 1/4/2022 DTaP/Tdap/Td series (2 - Td) 1/7/2023 Allergies as of 10/31/2017  Review Complete On: 10/31/2017 By: Tello Araya LPN Severity Noted Reaction Type Reactions Other Food High 09/29/2017    Anaphylaxis SOFT SHELL CRABS Pcn [Penicillins]  07/23/2017    Other (comments) Pt stated \"always been told PCN\" Current Immunizations  Never Reviewed Name Date Pneumococcal Conjugate (PCV-13) 10/26/2017 Pneumococcal Polysaccharide (PPSV-23) 1/4/2017 Not reviewed this visit You Were Diagnosed With   
  
 Codes Comments [facility]    -  Primary ICD-10-CM: C22.1 ICD-9-CM: 155.1 Vitals BP Pulse Temp Resp Height(growth percentile) Weight(growth percentile) 138/70 (BP 1 Location: Left arm, BP Patient Position: Sitting) 70 97.5 °F (36.4 °C) (Oral) 18 5' 7.5\" (1.715 m) 178 lb (80.7 kg) SpO2 BMI Smoking Status 97% 27.47 kg/m2 Never Smoker Vitals History BMI and BSA Data Body Mass Index Body Surface Area  
 27.47 kg/m 2 1.96 m 2 Preferred Pharmacy Pharmacy Name Phone [facility] [address] [phone] Your Updated Medication List  
  
   
 This list is accurate as of: 10/31/17 11:59 PM.  Always use your most recent med list.  
  
  
  
  
 amiodarone 200 mg tablet Commonly known as:  CORDARONE Take 1 Tab by mouth two (2) times a day. Indications: PREVENTION OF RECURRENT ATRIAL FIBRILLATION  
  
 aspirin 81 mg chewable tablet Take 81 mg by mouth nightly. CALCIUM 600 + D 600-125 mg-unit Tab Generic drug:  calcium-cholecalciferol (d3) Take  by mouth daily. docusate sodium 100 mg capsule Commonly known as:  Ltanya Regan Take 1 Cap by mouth two (2) times daily as needed for Constipation. gabapentin 300 mg capsule Commonly known as:  NEURONTIN Take 3 Caps by mouth two (2) times a day. 3 x 300mg caps (900mg) twice daily HYDROcodone-acetaminophen 5-325 mg per tablet Commonly known as:  Renea Fothergill Take 1-2 Tabs by mouth every four (4) hours as needed. Max Daily Amount: 12 Tabs.  
  
 magnesium 200 mg Tab Take  by mouth daily. ramipril 10 mg capsule Commonly known as:  ALTACE Take 1 Cap by mouth nightly. VITAMIN D3 1,000 unit Cap Generic drug:  cholecalciferol Take 5,000 Units by mouth daily. ZyrTEC 10 mg tablet Generic drug:  cetirizine Take  by mouth nightly. Indications: AUTOIMMUNE DISORDER, SJOGRENS. Introducing [facility] & HEALTH SERVICES! Dear Jossy Cary: Thank you for requesting a [employer] account. Our records indicate that you already have an active [employer] account. You can access your account anytime at [email or web address]. [email or web address] Did you know that you can access your hospital and ER discharge instructions at any time in [employer]? You can also review all of your test results from your hospital stay or ER visit. Additional Information If you have questions, please visit the Frequently Asked Questions section of the [employer] website at [email or web address] Remember, [employer] is NOT to be used for urgent needs. For medical emergencies, dial 911. Now available from your iPhone and Android! Please provide this summary of care documentation to your next provider. Your primary care clinician is listed as Soraya Lara If you have any questions after today's visit, please call [phone].

## 2017-11-01 NOTE — PROCEDURES
[facility]    Procedure Note    Date - 10/31/17    Preoperative Diagnosis - Superficial wound infection    Postoperative Diagnosis - Superficial wound infection    Operation Performed - Incision and drainage of wound infection    Indication - Blair New is a 65 y. o.yr old male s/p Whipple who developed a wound infection post-operatively. This improved with antibiotics but has a residual area of erythema and a draining sinus that should be opened for definitive management. Surgeon - Jennifer Dumont MD    Description of operation - The patient was consented prior to starting the procedure. All risks and benefits were explained in full detail. A time out was performed to identify the patient location and procedure. I then prepped and draped the area in standard sterile fashion. I then injected 1% lidocaine into the operative field to anesthetize the area. I then made a vertical incision along the patients prior midline incision for 1 cm overlying the draining sinus at the superior aspect of the wound. This drained serous, clear fluid and was packed with 1/4 packing tape after probing with a q-tip showed no undermining or extension. The mid-portion of his prior incision was also opened approximately 3 cm long in a vertical direction along the prior incision. No fluid or drainage was encountered. This portion was very superficial and not amenable to packing. The wound was then covered with dry gauze and tape was applied. Jennifer Dumont MD performed the entire procedure and was present the entire time.       Anesthesia - 1% lidocaine (2 mL)    Findings - Serous fluid, no purulence or undermining    Blood loss - minimal    Complications - None    Specimens/cultures - None    Disposition - home    Jennifer Dumont MD  10/31/2017

## 2017-11-01 NOTE — PROGRESS NOTES
[address] [facility] Surgery      Clinic Note - Follow up    Subjective     Car Yang returns for scheduled follow up today. 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 He has not noted any further episodes of A-fib since his hospitalization. He has an upcoming appointment with his Cardiologist.  He states he has been eating fairly well without n/v.  He is having normal bowel function, no diarrhea. He denies any significant abdominal pain. His incision has a small area of residual erythema and a single draining sinus at the upper-most portion of the wound. This is being managed with a dry dressing. He is now off antibiotics. He has no other complaints. Objective     Visit Vitals    /70 (BP 1 Location: Left arm, BP Patient Position: Sitting)    Pulse 70    Temp 97.5 °F (36.4 °C) (Oral)    Resp 18    Ht 5' 7.5\" (1.715 m)    Wt 178 lb (80.7 kg)    SpO2 97%    BMI 27.47 kg/m2         PE  GEN - Awake, alert, communicating appropriately. NAD  Pulm - CTAB  CV - RRR  Abd - soft, NT, ND. Midline incision with small amount of erythema along mid portion of wound and small draining sinus at upper most portion of incision. Fluid drainage is clear, thin serous fluid. No purulence or foul smell. Ext - warm, well perfused. Labs  10/26/17  Results for Ana Maria Parham (MRN [id number]) as of 11/1/2017 11:02   Ref.  Range 10/26/2017 12:22   WBC Latest Ref Range: 3.4 - 10.8 x10E3/uL 9.9   RBC Latest Ref Range: 4.14 - 5.80 x10E6/uL 3.93 (L)   HGB Latest Ref Range: 12.6 - 17.7 g/dL 12.1 (L)   HCT Latest Ref Range: 37.5 - 51.0 % 35.9 (L)   MCV Latest Ref Range: 79 - 97 fL 91   MCH Latest Ref Range: 26.6 - 33.0 pg 30.8   MCHC Latest Ref Range: 31.5 - 35.7 g/dL 33.7   RDW Latest Ref Range: 12.3 - 15.4 % 14.9   PLATELET Latest Ref Range: 150 - 379 x10E3/uL 456 (H)   Results for Ana Maria Parham (MRN [id number]) as of 11/1/2017 11:02   Ref. Range 10/26/2017 12:22   Sodium Latest Ref Range: 134 - 144 mmol/L 136   Potassium Latest Ref Range: 3.5 - 5.2 mmol/L 4.7   Chloride Latest Ref Range: 96 - 106 mmol/L 98   CO2 Latest Ref Range: 18 - 29 mmol/L 24   Glucose Latest Ref Range: 65 - 99 mg/dL 101 (H)   BUN Latest Ref Range: 8 - 27 mg/dL 17   Creatinine Latest Ref Range: 0.76 - 1.27 mg/dL 1.02   BUN/Creatinine ratio Latest Ref Range: 10 - 24  17   Calcium Latest Ref Range: 8.6 - 10.2 mg/dL 9.8   Magnesium Latest Ref Range: 1.6 - 2.3 mg/dL 2.5 (H)   GFR est non-AA Latest Ref Range: >59 mL/min/1.73 79   GFR est AA Latest Ref Range: >59 mL/min/1.73 91       Assessment     Miller Sharpe is a 65 y. 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 He had an episode of A-fib post-op and has had a wound infection. His A-fib has resolved with medical treatment. Plan     I did a small I&D of his midline wound in the clinic today to open the draining portion. This was packed which the patient will remove tomorrow. The mid portion of the wound was also opened superficially with a scalpel. No purulent material was encountered. This will be managed with dry dressings. I believe this should resolve fairly quickly. I will plan to go ahead with port placement next week and reevaluate his wounds at that time. The patient is going to see his cardiologist soon to discuss whether or not he needs to continue amiodarone. He will be following up with Dr. Antionette Jane regarding adjuvant chemotherapy. Overall, the patient is doing very well.       Tonja Crawford MD  10/31/17    CC: DO Dr. Isa Casey

## 2017-11-07 ENCOUNTER — APPOINTMENT (OUTPATIENT)
Dept: GENERAL RADIOLOGY | Age: 62
End: 2017-11-07
Attending: SURGERY
Payer: COMMERCIAL

## 2017-11-07 ENCOUNTER — ANESTHESIA (OUTPATIENT)
Dept: SURGERY | Age: 62
End: 2017-11-07
Payer: COMMERCIAL

## 2017-11-07 ENCOUNTER — ANESTHESIA EVENT (OUTPATIENT)
Dept: SURGERY | Age: 62
End: 2017-11-07
Payer: COMMERCIAL

## 2017-11-07 ENCOUNTER — HOSPITAL ENCOUNTER (OUTPATIENT)
Age: 62
Setting detail: OUTPATIENT SURGERY
Discharge: HOME | End: 2017-11-07
Attending: SURGERY | Admitting: SURGERY
Payer: COMMERCIAL

## 2017-11-07 VITALS
HEART RATE: 65 BPM | TEMPERATURE: 98 F | OXYGEN SATURATION: 96 % | DIASTOLIC BLOOD PRESSURE: 65 MMHG | SYSTOLIC BLOOD PRESSURE: 128 MMHG | RESPIRATION RATE: 16 BRPM | BODY MASS INDEX: 27.89 KG/M2 | WEIGHT: 184 LBS | HEIGHT: 68 IN

## 2017-11-07 PROCEDURE — 77030002933 HC SUT MCRYL J&J -A: Performed by: SURGERY

## 2017-11-07 PROCEDURE — 76210000021 HC REC RM PH II 0.5 TO 1 HR: Performed by: SURGERY

## 2017-11-07 PROCEDURE — 76010000149 HC OR TIME 1 TO 1.5 HR: Performed by: SURGERY

## 2017-11-07 PROCEDURE — 77030031139 HC SUT VCRL2 J&J -A: Performed by: SURGERY

## 2017-11-07 PROCEDURE — 74011000250 HC RX REV CODE- 250: Performed by: ANESTHESIOLOGY

## 2017-11-07 PROCEDURE — 77030002986 HC SUT PROL J&J -A: Performed by: SURGERY

## 2017-11-07 PROCEDURE — 77030020782 HC GWN BAIR PAWS FLX 3M -B

## 2017-11-07 PROCEDURE — 76210000006 HC OR PH I REC 0.5 TO 1 HR: Performed by: SURGERY

## 2017-11-07 PROCEDURE — 77030011640 HC PAD GRND REM COVD -A: Performed by: SURGERY

## 2017-11-07 PROCEDURE — 77030020256 HC SOL INJ NACL 0.9%  500ML: Performed by: SURGERY

## 2017-11-07 PROCEDURE — C1788 PORT, INDWELLING, IMP: HCPCS | Performed by: SURGERY

## 2017-11-07 PROCEDURE — 74011250636 HC RX REV CODE- 250/636

## 2017-11-07 PROCEDURE — 74011250636 HC RX REV CODE- 250/636: Performed by: SURGERY

## 2017-11-07 PROCEDURE — 76000 FLUOROSCOPY <1 HR PHYS/QHP: CPT

## 2017-11-07 PROCEDURE — 71010 XR CHEST PORT: CPT

## 2017-11-07 PROCEDURE — 74011000250 HC RX REV CODE- 250: Performed by: SURGERY

## 2017-11-07 PROCEDURE — 77030010507 HC ADH SKN DERMBND J&J -B: Performed by: SURGERY

## 2017-11-07 PROCEDURE — 74011250636 HC RX REV CODE- 250/636: Performed by: ANESTHESIOLOGY

## 2017-11-07 PROCEDURE — 76060000033 HC ANESTHESIA 1 TO 1.5 HR: Performed by: SURGERY

## 2017-11-07 DEVICE — POWERPORT IMPLANTABLE PORT WITH ATTACHABLE 8F CHRONOFLEX OPEN-ENDED SINGLE-LUMEN VENOUS CATHETER INTERMEDIATE KIT (WITHOUT SUTURE PLUGS)
Type: IMPLANTABLE DEVICE | Site: CHEST | Status: FUNCTIONAL
Brand: POWERPORT, CHRONOFLEX

## 2017-11-07 RX ORDER — MORPHINE SULFATE 10 MG/ML
2 INJECTION, SOLUTION INTRAMUSCULAR; INTRAVENOUS
Status: DISCONTINUED | OUTPATIENT
Start: 2017-11-07 | End: 2017-11-07 | Stop reason: HOSPADM

## 2017-11-07 RX ORDER — OXYCODONE AND ACETAMINOPHEN 5; 325 MG/1; MG/1
1 TABLET ORAL
Qty: 20 TAB | Refills: 0 | Status: ON HOLD | OUTPATIENT
Start: 2017-11-07 | End: 2018-02-15

## 2017-11-07 RX ORDER — FENTANYL CITRATE 50 UG/ML
50 INJECTION, SOLUTION INTRAMUSCULAR; INTRAVENOUS AS NEEDED
Status: DISCONTINUED | OUTPATIENT
Start: 2017-11-07 | End: 2017-11-07 | Stop reason: HOSPADM

## 2017-11-07 RX ORDER — DIPHENHYDRAMINE HYDROCHLORIDE 50 MG/ML
12.5 INJECTION, SOLUTION INTRAMUSCULAR; INTRAVENOUS AS NEEDED
Status: DISCONTINUED | OUTPATIENT
Start: 2017-11-07 | End: 2017-11-07 | Stop reason: HOSPADM

## 2017-11-07 RX ORDER — SODIUM CHLORIDE 0.9 % (FLUSH) 0.9 %
5-10 SYRINGE (ML) INJECTION AS NEEDED
Status: DISCONTINUED | OUTPATIENT
Start: 2017-11-07 | End: 2017-11-07 | Stop reason: HOSPADM

## 2017-11-07 RX ORDER — PROPOFOL 10 MG/ML
INJECTION, EMULSION INTRAVENOUS AS NEEDED
Status: DISCONTINUED | OUTPATIENT
Start: 2017-11-07 | End: 2017-11-07 | Stop reason: HOSPADM

## 2017-11-07 RX ORDER — BUPIVACAINE HYDROCHLORIDE AND EPINEPHRINE 2.5; 5 MG/ML; UG/ML
INJECTION, SOLUTION EPIDURAL; INFILTRATION; INTRACAUDAL; PERINEURAL AS NEEDED
Status: DISCONTINUED | OUTPATIENT
Start: 2017-11-07 | End: 2017-11-07 | Stop reason: HOSPADM

## 2017-11-07 RX ORDER — ONDANSETRON 2 MG/ML
INJECTION INTRAMUSCULAR; INTRAVENOUS AS NEEDED
Status: DISCONTINUED | OUTPATIENT
Start: 2017-11-07 | End: 2017-11-07 | Stop reason: HOSPADM

## 2017-11-07 RX ORDER — VANCOMYCIN 2 GRAM/500 ML IN 0.9 % SODIUM CHLORIDE INTRAVENOUS
2000 ONCE
Status: DISCONTINUED | OUTPATIENT
Start: 2017-11-07 | End: 2017-11-07 | Stop reason: HOSPADM

## 2017-11-07 RX ORDER — MIDAZOLAM HYDROCHLORIDE 1 MG/ML
INJECTION, SOLUTION INTRAMUSCULAR; INTRAVENOUS AS NEEDED
Status: DISCONTINUED | OUTPATIENT
Start: 2017-11-07 | End: 2017-11-07 | Stop reason: HOSPADM

## 2017-11-07 RX ORDER — SODIUM CHLORIDE, SODIUM LACTATE, POTASSIUM CHLORIDE, CALCIUM CHLORIDE 600; 310; 30; 20 MG/100ML; MG/100ML; MG/100ML; MG/100ML
125 INJECTION, SOLUTION INTRAVENOUS CONTINUOUS
Status: DISCONTINUED | OUTPATIENT
Start: 2017-11-07 | End: 2017-11-07 | Stop reason: HOSPADM

## 2017-11-07 RX ORDER — HYDROMORPHONE HYDROCHLORIDE 1 MG/ML
0.2 INJECTION, SOLUTION INTRAMUSCULAR; INTRAVENOUS; SUBCUTANEOUS
Status: DISCONTINUED | OUTPATIENT
Start: 2017-11-07 | End: 2017-11-07 | Stop reason: HOSPADM

## 2017-11-07 RX ORDER — SODIUM CHLORIDE 9 MG/ML
25 INJECTION, SOLUTION INTRAVENOUS CONTINUOUS
Status: DISCONTINUED | OUTPATIENT
Start: 2017-11-07 | End: 2017-11-07 | Stop reason: HOSPADM

## 2017-11-07 RX ORDER — MIDAZOLAM HYDROCHLORIDE 1 MG/ML
1 INJECTION, SOLUTION INTRAMUSCULAR; INTRAVENOUS AS NEEDED
Status: DISCONTINUED | OUTPATIENT
Start: 2017-11-07 | End: 2017-11-07 | Stop reason: HOSPADM

## 2017-11-07 RX ORDER — MIDAZOLAM HYDROCHLORIDE 1 MG/ML
0.5 INJECTION, SOLUTION INTRAMUSCULAR; INTRAVENOUS
Status: DISCONTINUED | OUTPATIENT
Start: 2017-11-07 | End: 2017-11-07 | Stop reason: HOSPADM

## 2017-11-07 RX ORDER — ONDANSETRON 2 MG/ML
4 INJECTION INTRAMUSCULAR; INTRAVENOUS AS NEEDED
Status: DISCONTINUED | OUTPATIENT
Start: 2017-11-07 | End: 2017-11-07 | Stop reason: HOSPADM

## 2017-11-07 RX ORDER — FENTANYL CITRATE 50 UG/ML
INJECTION, SOLUTION INTRAMUSCULAR; INTRAVENOUS AS NEEDED
Status: DISCONTINUED | OUTPATIENT
Start: 2017-11-07 | End: 2017-11-07 | Stop reason: HOSPADM

## 2017-11-07 RX ORDER — LIDOCAINE HYDROCHLORIDE 10 MG/ML
0.1 INJECTION, SOLUTION EPIDURAL; INFILTRATION; INTRACAUDAL; PERINEURAL AS NEEDED
Status: DISCONTINUED | OUTPATIENT
Start: 2017-11-07 | End: 2017-11-07 | Stop reason: HOSPADM

## 2017-11-07 RX ORDER — SODIUM CHLORIDE 0.9 % (FLUSH) 0.9 %
5-10 SYRINGE (ML) INJECTION EVERY 8 HOURS
Status: DISCONTINUED | OUTPATIENT
Start: 2017-11-07 | End: 2017-11-07 | Stop reason: HOSPADM

## 2017-11-07 RX ORDER — OXYCODONE AND ACETAMINOPHEN 5; 325 MG/1; MG/1
1 TABLET ORAL AS NEEDED
Status: DISCONTINUED | OUTPATIENT
Start: 2017-11-07 | End: 2017-11-07 | Stop reason: HOSPADM

## 2017-11-07 RX ORDER — PROPOFOL 10 MG/ML
INJECTION, EMULSION INTRAVENOUS
Status: DISCONTINUED | OUTPATIENT
Start: 2017-11-07 | End: 2017-11-07 | Stop reason: HOSPADM

## 2017-11-07 RX ORDER — HEPARIN 100 UNIT/ML
SYRINGE INTRAVENOUS AS NEEDED
Status: DISCONTINUED | OUTPATIENT
Start: 2017-11-07 | End: 2017-11-07 | Stop reason: HOSPADM

## 2017-11-07 RX ORDER — SODIUM CHLORIDE, SODIUM LACTATE, POTASSIUM CHLORIDE, CALCIUM CHLORIDE 600; 310; 30; 20 MG/100ML; MG/100ML; MG/100ML; MG/100ML
INJECTION, SOLUTION INTRAVENOUS
Status: DISCONTINUED | OUTPATIENT
Start: 2017-11-07 | End: 2017-11-07 | Stop reason: HOSPADM

## 2017-11-07 RX ORDER — FENTANYL CITRATE 50 UG/ML
25 INJECTION, SOLUTION INTRAMUSCULAR; INTRAVENOUS
Status: DISCONTINUED | OUTPATIENT
Start: 2017-11-07 | End: 2017-11-07 | Stop reason: HOSPADM

## 2017-11-07 RX ADMIN — LIDOCAINE HYDROCHLORIDE 0.1 ML: 10 INJECTION, SOLUTION EPIDURAL; INFILTRATION; INTRACAUDAL; PERINEURAL at 10:14

## 2017-11-07 RX ADMIN — FENTANYL CITRATE 25 MCG: 50 INJECTION, SOLUTION INTRAMUSCULAR; INTRAVENOUS at 12:19

## 2017-11-07 RX ADMIN — PROPOFOL 30 MG: 10 INJECTION, EMULSION INTRAVENOUS at 12:08

## 2017-11-07 RX ADMIN — FENTANYL CITRATE 50 MCG: 50 INJECTION, SOLUTION INTRAMUSCULAR; INTRAVENOUS at 12:01

## 2017-11-07 RX ADMIN — VANCOMYCIN HYDROCHLORIDE 2000 MG: 10 INJECTION, POWDER, LYOPHILIZED, FOR SOLUTION INTRAVENOUS at 10:20

## 2017-11-07 RX ADMIN — PROPOFOL 20 MG: 10 INJECTION, EMULSION INTRAVENOUS at 12:29

## 2017-11-07 RX ADMIN — FENTANYL CITRATE 25 MCG: 50 INJECTION, SOLUTION INTRAMUSCULAR; INTRAVENOUS at 12:08

## 2017-11-07 RX ADMIN — MIDAZOLAM HYDROCHLORIDE 2 MG: 1 INJECTION, SOLUTION INTRAMUSCULAR; INTRAVENOUS at 12:01

## 2017-11-07 RX ADMIN — ONDANSETRON 4 MG: 2 INJECTION INTRAMUSCULAR; INTRAVENOUS at 12:19

## 2017-11-07 RX ADMIN — PROPOFOL 20 MG: 10 INJECTION, EMULSION INTRAVENOUS at 12:28

## 2017-11-07 RX ADMIN — PROPOFOL 20 MG: 10 INJECTION, EMULSION INTRAVENOUS at 12:56

## 2017-11-07 RX ADMIN — SODIUM CHLORIDE, SODIUM LACTATE, POTASSIUM CHLORIDE, AND CALCIUM CHLORIDE 125 ML/HR: 600; 310; 30; 20 INJECTION, SOLUTION INTRAVENOUS at 10:14

## 2017-11-07 RX ADMIN — PROPOFOL 50 MCG/KG/MIN: 10 INJECTION, EMULSION INTRAVENOUS at 12:04

## 2017-11-07 RX ADMIN — SODIUM CHLORIDE, SODIUM LACTATE, POTASSIUM CHLORIDE, CALCIUM CHLORIDE: 600; 310; 30; 20 INJECTION, SOLUTION INTRAVENOUS at 12:01

## 2017-11-07 NOTE — IP AVS SNAPSHOT
[address] [facility] [address] [place] [address] 
[phone] Patient: Talya Mcdaniel MRN: [id number] [facility]: About your hospitalization You were admitted on:  2017 You last received care in the:  [facility] PACU You were discharged on:  2017 Why you were hospitalized Your primary diagnosis was:  Not on File Things You Need To Do (next 8 weeks) Go to Julio Reid MD today Go to previously scheduled appointment with Dr. Hamilton Gupta to discuss chemotherapy. Phone:  [phone] Where:  [address] [facility], [address] Dates , [address] [facility] Schedule an appointment with Lawrence Fuentes MD as soon as possible for a visit today As needed for questions related to your port. Phone:  [phone] Where:  [address] [facility], [facility] Valerio Sweeney [address] [address]  New Patient with Julio Reid MD at  3:30 PM  
Where:  [address] [facility] Oncology at [facility]) Discharge Orders None A check geremias indicates which time of day the medication should be taken. My Medications TAKE these medications as instructed Instructions Each Dose to Equal  
 Morning Noon Evening Bedtime  
 amiodarone 200 mg tablet Commonly known as:  CORDARONE Your last dose was: Your next dose is: Take 1 Tab by mouth two (2) times a day. Indications: PREVENTION OF RECURRENT ATRIAL FIBRILLATION  
 200 mg  
    
   
   
   
  
 aspirin 81 mg chewable tablet Your last dose was: Your next dose is: Take 81 mg by mouth nightly. 81 mg CALCIUM 600 + D 600-125 mg-unit Tab Generic drug:  calcium-cholecalciferol (d3) Your last dose was: Your next dose is: Take  by mouth daily. docusate sodium 100 mg capsule Commonly known as:  Erick Garcia Your last dose was: Your next dose is: Take 1 Cap by mouth two (2) times daily as needed for Constipation. 100 mg  
    
   
   
   
  
 gabapentin 300 mg capsule Commonly known as:  NEURONTIN Your last dose was: Your next dose is: Take 3 Caps by mouth two (2) times a day. 3 x 300mg caps (900mg) twice daily 900 mg  
    
   
   
   
  
 magnesium 200 mg Tab Your last dose was: Your next dose is: Take  by mouth daily. oxyCODONE-acetaminophen 5-325 mg per tablet Commonly known as:  PERCOCET Your last dose was: Your next dose is: Take 1 Tab by mouth every four (4) hours as needed for Pain. Max Daily Amount: 6 Tabs. 1 Tab  
    
   
   
   
  
 ramipril 10 mg capsule Commonly known as:  ALTACE Your last dose was: Your next dose is: Take 1 Cap by mouth nightly. 10 mg  
    
   
   
   
  
 VITAMIN D3 1,000 unit Cap Generic drug:  cholecalciferol Your last dose was: Your next dose is: Take 5,000 Units by mouth daily. 5000 Units ZyrTEC 10 mg tablet Generic drug:  cetirizine Your last dose was: Your next dose is: Take  by mouth nightly. Indications: AUTOIMMUNE DISORDER, SJOGRENS. Where to Get Your Medications Information on where to get these meds will be given to you by the nurse or doctor. ! Ask your nurse or doctor about these medications  
  oxyCODONE-acetaminophen 5-325 mg per tablet Discharge Instructions Patient Discharge Instructions Linda Jones / [id number] : 1955 Admitted 2017 Discharged: 2017 · It is important that you take the medication exactly as they are prescribed. · Keep your medication in the bottles provided by the pharmacist and keep a list of the medication names, dosages, and times to be taken in your wallet. · Do not take other medications without consulting your doctor. What to do at [facility] Recommended diet: Resume regular diet. Recommended activity: No strenuous activity with right arm for 1-2 days until pain resolves. No Driving While Taking narcotic pain medications. May Take Shower in 1 days. Do not soak incision under water for at least 7 days. If you experience any of the following symptoms Fevers, Chills, Nausea, Vomitting, Redness or Drainage at Surgical Site(s) or Any Other Questions or Concerns Please Call -  [phone]. Follow-up with Dr. Geoff Bond as needed for questions related to your port. You will follow up with Dr. Jeovany Sifuentes at your previously scheduled appointment. Information obtained by : 
I understand that if any problems occur once I am at home I am to contact my physician. I understand and acknowledge receipt of the instructions indicated above. Physician's or R.N.'s Signature                                                                  Date/Time Patient or Representative Signature                                                          Date/Time Instructions Following Ambulatory Surgery Activity · As tolerated and directed by your doctor · Bathe or shower as directed by your doctor Diet · Clear liquids until no nausea or vomiting; then light diet for the first day · Advance to regular diet on second day, unless your doctor orders otherwise · If nausea and vomiting continues, call your doctor Pain · Take pain medication as directed by your doctor ·  Call your doctor if pain is NOT relieved by medication · DO NOT take aspirin or blood thinners until directed by your doctor Follow-Up Phone Calls · Will be made nursing staff · If you have any problems, call your doctor as needed Call your doctor if 
· Excessive bleeding that does not stop after holding mild pressure over the area · Temperature of 101 degrees F or above · Redness,excessive swelling or bruising, and/or green or yellow, smelly discharge from incision After Anesthesia · For the first 24 hours: DO NOT Drive, Drink alcoholic beverages, or Make important decisions · Be aware of dizziness following anesthesia and while taking pain medication Introducing [facility] & HEALTH SERVICES! Dear Bradley Cole: Thank you for requesting a [employer] account. Our records indicate that you already have an active [employer] account. You can access your account anytime at [email or web address] Did you know that you can access your hospital and ER discharge instructions at any time in [employer]? You can also review all of your test results from your hospital stay or ER visit. Additional Information If you have questions, please visit the Frequently Asked Questions section of the [employer] website at [email or web address] Remember, [employer] is NOT to be used for urgent needs. For medical emergencies, dial 911. Now available from your iPhone and Android! Providers Seen During Your Hospitalization Provider Specialty Primary office phone Lisa Kolb[facility] General Surgery [phone] Your Primary Care Physician (PCP) Primary Care Physician Office Phone Office Fax Apolinar OGDEN [phone] You are allergic to the following Allergen Reactions Other Food Anaphylaxis SOFT SHELL CRABS Pcn (Penicillins) Other (comments) Pt stated \"always been told PCN\" Recent Documentation Height Weight BMI Smoking Status 1.727 m 83.5 kg 27.98 kg/m2 Never Smoker Emergency Contacts Name Discharge Info Relation Home Work Mobile Hyun Cristina DISCHARGE CAREGIVER [3] Spouse [3] [phone] Roberto Cristina DISCHARGE CAREGIVER [3] Child [2] [phone] Ridge Cristina III DISCHARGE CAREGIVER [3] Child [2] [phone] Patient Belongings The following personal items are in your possession at time of discharge: 
  Dental Appliances: None  Visual Aid: Glasses (with son)      Home Medications: None   Jewelry: None  Clothing: Footwear, Pants, Undergarments, Socks, Shirt (sent to [employer])    Other Valuables: None Discharge Instructions Attachments/References MEFS - OXYCODONE/ACETAMINOPHEN (PERCOCET, ROXICET) - (BY MOUTH) (ENGLISH) Patient Handouts Oxycodone/Acetaminophen (Percocet, Roxicet) - (By mouth) Why this medicine is used:  
Treats pain. This medicine contains a narcotic pain reliever. Contact a nurse or doctor right away if you have: 
· Extreme weakness, shallow breathing, slow heartbeat · Sweating or cold, clammy skin · Skin blisters, rash, or peeling Common side effects: 
· Constipation · Nausea, vomiting · Tiredness © 2017 [address] Information is for End User's use only and may not be sold, redistributed or otherwise used for commercial purposes. Please provide this summary of care documentation to your next provider. Signatures-by signing, you are acknowledging that this After Visit Summary has been reviewed with you and you have received a copy. Patient Signature:  ____________________________________________________________ Date:  ____________________________________________________________  
  
Anne-Marie Casper Provider Signature:  ____________________________________________________________ Date:  ____________________________________________________________

## 2017-11-07 NOTE — PERIOP NOTES
Patient resting comfortably, awaiting x-ray confirmation of proper port placement  . Vitals stable.  No distress noted, pt denies pain

## 2017-11-07 NOTE — H&P
Date of Surgery Update:  Navid Snyder was seen and examined. History and physical has been reviewed. The patient has been examined. There have been no significant clinical changes since the completion of the originally dated History and Physical.  The patient is s/p whipple procedure on 10/6/17 and developed a brief episode of A-fib post op but is now in sinus rhythm. He had a wound infection of his midline incision that is also now healed. He is otherwise doing well. He has a pT3pN1 cholangiocarcinoma and requires a port of adjuvant chemotherapy. A complete discussion of the risks, benefits and alternatives to surgery were discussed with the patient who was keen to proceed. Signed By: Petra Mcfarlane MD     November 7, 2017 11:39 AM         Please note from the office and include the additional information below:    Past Medical History  Past Medical History:   Diagnosis Date    Arrhythmia     Previous a.fib, ablation, NSR now; NO LONGER FOLLOWED BY CARDIOLOGIST.  Autoimmune disease ([facility]     SJOGREN'S    Cancer ([facility]     COMMON BILE DUCT, ADENOCARCINOMA    Hypertension     Sepsis ([facility] 2017        Past Surgical History  Past Surgical History:   Procedure Laterality Date    HX GI      COLONOSCOPY, POLYPS (BENIGN)    HX GI  10/06/2017    Viktor Portillo [facility]     [address]  2005    Ablation     HX ORTHOPAEDIC  2000    Spinal fusion W/ HARDWARE    NEUROLOGICAL PROCEDURE UNLISTED  2005    [place] hole washout from cerebral hemorrhage        Social History  The patient Navid Snyder  reports that he has never smoked. He has never used smokeless tobacco. He reports that he drinks alcohol. He reports that he does not use illicit drugs.      Family History  Family History   Problem Relation Age of Onset    Cancer Father      Breast and Colon    Cancer Mother      LEUKEMIA    No Known Problems Sister     No Known Problems Brother     No Known Problems Sister    Randy Art Anesth Problems Neg Ashleigh Pennington MD

## 2017-11-07 NOTE — ANESTHESIA POSTPROCEDURE EVALUATION
Post-Anesthesia Evaluation and Assessment    Patient: Edwige Busby MRN: [id number]  SSN: [id number]    YOB: 1955  Age: 65 y.o. Sex: male       Cardiovascular Function/Vital Signs  Visit Vitals    /65    Pulse 65    Temp 36.7 °C (98 °F)    Resp 16    Ht 5' 8\" (1.727 m)    Wt 83.5 kg (184 lb)    SpO2 96%    BMI 27.98 kg/m2       Patient is status post MAC anesthesia for Procedure(s):  ELSA CATH INSERTION. Nausea/Vomiting: None    Postoperative hydration reviewed and adequate. Pain:  Pain Scale 1: FLACC (11/07/17 1407)  Pain Intensity 1: 0 (11/07/17 1407)   Managed    Neurological Status:   Neuro (WDL): Within Defined Limits (11/07/17 1426)  Neuro  LUE Motor Response: Purposeful (11/07/17 1426)  LLE Motor Response: Purposeful (11/07/17 1426)  RUE Motor Response: Purposeful (11/07/17 1426)  RLE Motor Response: Purposeful (11/07/17 1426)   At baseline    Mental Status and Level of Consciousness: Arousable    Pulmonary Status:   O2 Device: Room air (11/07/17 1415)   Adequate oxygenation and airway patent    Complications related to anesthesia: None    Post-anesthesia assessment completed.  No concerns    Signed By: Mariela Fernández DO     November 7, 2017

## 2017-11-07 NOTE — ANESTHESIA PREPROCEDURE EVALUATION
Anesthetic History   No history of anesthetic complications            Review of Systems / Medical History  Patient summary reviewed, nursing notes reviewed and pertinent labs reviewed    Pulmonary  Within defined limits                 Neuro/Psych   Within defined limits           Cardiovascular    Hypertension        Dysrhythmias : atrial fibrillation           GI/Hepatic/Renal  Within defined limits              Endo/Other  Within defined limits           Other Findings              Physical Exam    Airway  Mallampati: II  TM Distance: > 6 cm  Neck ROM: normal range of motion   Mouth opening: Normal     Cardiovascular  Regular rate and rhythm,  S1 and S2 normal,  no murmur, click, rub, or gallop             Dental  No notable dental hx       Pulmonary  Breath sounds clear to auscultation               Abdominal  GI exam deferred       Other Findings            Anesthetic Plan    ASA: 2  Anesthesia type: MAC    Monitoring Plan: CVP  Post-op pain plan if not by surgeon: regional    Induction: Intravenous  Anesthetic plan and risks discussed with: Patient

## 2017-11-07 NOTE — PERIOP NOTES
Patient and family in Phase II. Discharge instructions reviewed, opportunity for questions given. Prescriptions given to family member, side effects discussed. IV removed and all belongings returned to patient. Patient is ready for discharge.

## 2017-11-07 NOTE — BRIEF OP NOTE
BRIEF OPERATIVE NOTE    Date of Procedure: 11/7/2017   Preoperative Diagnosis: CHOLANGIOCARCINOMA  Postoperative Diagnosis: CHOLANGIOCARCINOMA   Procedure(s):  ELSA CATH INSERTION  Surgeon(s) and Role:     * Karen Tomlinson MD - Primary         Assistant Staff:       Surgical Staff:  Circ-1: Yanely Ibarra RN  Circ-Relief: Tom Brewer RN  Scrub RN-1: Vanessa Reid RN  Scrub RN-Relief: Tom Brewer RN  Event Time In   Incision Start 1228   Incision Close 1304     Anesthesia: MAC   Estimated Blood Loss: 2 mL  Specimens: None  Findings: Right IJ accessed under ultrasound guidance. Catheter confirmed in SVC by fluoroscopy. Port flushed and withdrew easily. Complications: None  Implants:   Implant Name Type Inv.  Item Serial No.  Lot No. LRB No. Used Action   PORT INTERMED 8FR POWERPORT --  - SN/A   PORT INTERMED 8FR POWERPORT --  N/A [place] [employer] [id number] Right 1 Implanted

## 2017-11-07 NOTE — OP NOTES
[address] [facility] [address] [place] [address], [address]   OP NOTE       Name:  Evangelist Roth   MR#:  [id number]   :  1955   Account #:  [de-identified]    Surgery Date:  2017   Date of Adm:  2017       REOPERATIVE DIAGNOSIS: Cholangiocarcinoma. POSTOPERATIVE DIAGNOSIS: Cholangiocarcinoma. PROCEDURES PERFORMED: Insertion of Port-A-Cath with   ultrasound and fluoroscopic guidance. SURGEON: Diana Monroe MD     ANESTHESIA: Monitored care. ESTIMATED BLOOD LOSS: 2 mL. FINDINGS: The patient's right internal jugular vein was accessed   under ultrasound guidance. The catheter was confirmed to be   positioned in the superior vena cava by fluoroscopy. The port was   flushed and withdrew easily. INDICATIONS: The patient is a 72-year-old gentleman who recently   underwent a Whipple for a distal bile duct cancer. His pathology   showed a T3N1 cholangiocarcinoma and he is being referred for   adjuvant chemotherapy. A port was requested for placement to help   deliver his chemotherapy. A complete discussion of risks, benefits, and   alternatives to surgery were had with the patient and he was in   agreement to proceed. DESCRIPTION OF PROCEDURE: After complete discussion of risks,   benefits, and alternatives of surgery, informed consent was obtained   from the patient. The patient was then brought back to the operating   room and placed under anesthesia monitored care. He was prepped   and draped in the usual sterile fashion, and a proper time-out was   performed. With this completed, we used a sterile ultrasound probe to   examine the right neck. The internal jugular vein was easily identified. This was noted to be compressible and adjacent to the carotid artery. We injected 0.25% Marcaine with epinephrine into the skin and   subcutaneous tissue outside of the internal jugular vein. This was done   under ultrasound guidance.      Once this had taken effect, we made a small stab incision near the   base of the neck in appropriate position, accessed the vein. We then   passed a needle through this incision into the internal jugular vein   under ultrasound guidance. Dark venous blood was obtained. This was   completed on the first attempt. A wire was then passed into the vein   and the position of the wire was confirmed going into the superior vena   cava using fluoroscopy. The needle was removed and the wire was left   in place. We then turned our attention to creation of the port pocket. This was   done at the level of approximately 2 fingerbreadths below the clavicle   in the deltopectoral groove. We injected local anesthetic into the skin   and subcutaneous tissues along our incision site and then made an   approximately 2.5 cm incision. This was carried down using   electrocautery down to the muscular fascia. With a combination of   electrocautery and blunt dissection, we created a port pocket just   anterior to the muscular fascia from the pectoralis muscle. This was   done caudal to the incision. Once this was large enough to house the   port, we then placed 2-0 Prolene stay sutures in appropriate positions   into the muscular fascia. These were clipped out of the way for later   use. We then flushed the catheter and passed this on a    through the upper portion of the port pocket incision in the   subcutaneous tissues overlying the clavicle and out through the stab   incision at the base of the neck. Once this was in position, we then   advanced a dilator and introducer sheath over the wire into the internal   jugular vein and down into the superior vena cava. The position of this   was confirmed on fluoroscopy. We then removed the wire and dilator. The catheter was then   advanced into the introducer sheath to an appropriate distance. The   introducer sheath was then torn away and passed off the field.  We   then pulled the catheter back under fluoroscopy until this was in the   appropriate position outside of the right atrium and the superior vena   cava. We then clipped the catheter in the port pocket. The port was   then attached to the stay sutures and then this had previously been   flushed. We then connected the port to the catheter using the   appropriate plastic attachment device and the port was then placed in   the pocket and secured using the previously placed Prolene sutures. We obtained another fluoroscopic image, which demonstrated good   positioning of the catheter and port without any kinks or bends. The   catheter was then flushed initially using heparinized saline followed by   a strong heparin flush. This heparin flush lock solution was placed with   2.5 mL of solution. The catheter withdrew and flushed easily. We then   closed the port site incision in 2 layers using interrupted 3-0 Vicryl   sutures to reapproximate the deep dermis, followed by a running 4-0   Monocryl subcuticular stitch to approximate the skin. Dermabond skin   adhesive was placed over this incision. An additional 4-0 Monocryl   interrupted suture was placed at the base of the neck in this stab   incision and this was then covered with Dermabond skin adhesive as   well. The patient was awakened from his sedation and then taken to   the postanesthesia care unit in stable condition. All needle and   instrument counts were correct at the completion of the case. I was   present and scrubbed throughout the entirety of the case. There were   no immediate complications. SPECIMENS REMOVED: None. COMPLICATIONS: None. IMPLANTS: [employer] Power[employer]. DISPOSITION: PACU.         MD TIANNA Garcia / LASHA   D:  11/07/2017   14:26   T:  11/07/2017   15:43   Job #:  [id number]

## 2017-11-07 NOTE — DISCHARGE INSTRUCTIONS
Patient Discharge Instructions    Linda Jones / [id number] : 1955    Admitted 2017 Discharged: 2017       · It is important that you take the medication exactly as they are prescribed. · Keep your medication in the bottles provided by the pharmacist and keep a list of the medication names, dosages, and times to be taken in your wallet. · Do not take other medications without consulting your doctor. What to do at Home    Recommended diet: Resume regular diet. Recommended activity: No strenuous activity with right arm for 1-2 days until pain resolves. No Driving While Taking narcotic pain medications. May Take Shower in 1 days. Do not soak incision under water for at least 7 days. If you experience any of the following symptoms Fevers, Chills, Nausea, Vomitting, Redness or Drainage at Surgical Site(s) or Any Other Questions or Concerns Please Call -  [phone]. Follow-up with Dr. Terrence Wang as needed for questions related to your port. You will follow up with Dr. Rome Santos at your previously scheduled appointment. Information obtained by :  I understand that if any problems occur once I am at home I am to contact my physician. I understand and acknowledge receipt of the instructions indicated above.                                                                                                                                            Physician's or R.N.'s Signature                                                                  Date/Time                                                                                                                                              Patient or Representative Signature                                                          Date/Time      Instructions Following Ambulatory Surgery    Activity  · As tolerated and directed by your doctor  · Bathe or shower as directed by your doctor    Diet  · Clear liquids until no nausea or vomiting; then light diet for the first day  · Advance to regular diet on second day, unless your doctor orders otherwise  · If nausea and vomiting continues, call your doctor    Pain  · Take pain medication as directed by your doctor  ·  Call your doctor if pain is NOT relieved by medication  · DO NOT take aspirin or blood thinners until directed by your doctor      Follow-Up Phone Calls  · Will be made nursing staff  · If you have any problems, call your doctor as needed    Call your doctor if  · Excessive bleeding that does not stop after holding mild pressure over the area  · Temperature of 101 degrees F or above  · Redness,excessive swelling or bruising, and/or green or yellow, smelly discharge from incision    After Anesthesia  · For the first 24 hours: DO NOT Drive, Drink alcoholic beverages, or Make important decisions  · Be aware of dizziness following anesthesia and while taking pain medication

## 2017-11-08 ENCOUNTER — PATIENT OUTREACH (OUTPATIENT)
Dept: OTHER | Age: 62
End: 2017-11-08

## 2017-11-08 NOTE — PROGRESS NOTES
[facility] follow up. [facility]  Patient with Planned OP visit for PICC placement/  Chemotherapy planned. Chart review:  PICC placement/   Oncologist apt tomorrow to discuss chemo. Contacted patient for transitions of care services. Verified  and address for HIPAA security. * Mr. Elsy Alexander is in good spirits/ at home with son and youngest grandson  * PICC placement yesterday, \"Went as smooth as silk\"   \"I don't even know it's there. \"     - Dressing is C/D/I. No redness/ fever chills. * Took pain pill after return home from procedure/ achy last night before bed. No pain today. * Appetite good/  Trying to gain weight prior to chemotherapy. * Enjoying the nice cold rainy day today/  Anticipating apt with Oncologist tomorrow to discuss chemo plans. His wife, RN, will go with him. No questions or concerns/  He has my contact info if any needs arise. Will follow for [facility] services. Call Friday to check in on chemo plans.

## 2017-11-09 ENCOUNTER — OFFICE VISIT (OUTPATIENT)
Dept: ONCOLOGY | Age: 62
End: 2017-11-09

## 2017-11-09 VITALS
TEMPERATURE: 98.1 F | WEIGHT: 182.6 LBS | SYSTOLIC BLOOD PRESSURE: 152 MMHG | DIASTOLIC BLOOD PRESSURE: 76 MMHG | RESPIRATION RATE: 16 BRPM | BODY MASS INDEX: 27.68 KG/M2 | HEART RATE: 83 BPM | HEIGHT: 68 IN

## 2017-11-09 DIAGNOSIS — C22.1 CHOLANGIOCARCINOMA OF BILIARY TRACT (HCC): Primary | ICD-10-CM

## 2017-11-09 DIAGNOSIS — K86.81 EXOCRINE PANCREATIC INSUFFICIENCY: ICD-10-CM

## 2017-11-09 RX ORDER — CYANOCOBALAMIN 1000 UG/ML
1000 INJECTION, SOLUTION INTRAMUSCULAR; SUBCUTANEOUS
COMMUNITY
End: 2018-09-20 | Stop reason: SDUPTHER

## 2017-11-09 RX ORDER — HYDROCODONE BITARTRATE AND ACETAMINOPHEN 5; 325 MG/1; MG/1
TABLET ORAL
Refills: 0 | COMMUNITY
Start: 2017-10-16 | End: 2018-06-07

## 2017-11-09 NOTE — PATIENT INSTRUCTIONS
You will need Xeloda to be taken orally 14 days on and 7 days off. Capecitabine (By mouth)   Capecitabine (zmi-z-OAH-ta-been)  Treats cancer, including breast and colorectal cancer. Brand Name(s): Xeloda   There may be other brand names for this medicine. When This Medicine Should Not Be Used: This medicine is not right for everyone. Do not use it if you had an allergic reaction to capecitabine or 5-fluorouracil, you are pregnant, or you have DPD deficiency. How to Use This Medicine:   Tablet  · Medicines used to treat cancer are very strong and can have many side effects. Before receiving this medicine, make sure you understand all the risks and benefits. It is important for you to work closely with your doctor during your treatment. · Take your medicine as directed. Your dose may need to be changed several times to find what works best for you. · Take this medicine with food or within 30 minutes after you eat. · Swallow the tablet whole with water. Do not cut, crush, break, or chew it. If the tablet must be cut or crushed, it should be done by a pharmacist.  · Read and follow the patient instructions that come with this medicine. Talk to your doctor or pharmacist if you have any questions. · Missed dose: Take a dose as soon as you remember. If it is almost time for your next dose, wait until then and take a regular dose. Do not take extra medicine to make up for a missed dose. · Store the medicine in a closed container at room temperature, away from heat, moisture, and direct light. Drugs and Foods to Avoid:   Ask your doctor or pharmacist before using any other medicine, including over-the-counter medicines, vitamins, and herbal products. · Some medicines can affect how capecitabine works. Tell your doctor if you are using a blood thinner (including phenprocoumon or warfarin),  leucovorin, or phenytoin.   Warnings While Using This Medicine:   · This medicine may cause birth defects if either partner is using it during conception or pregnancy. Tell your doctor right away if you or your partner becomes pregnant. Female patients must use effective birth control during treatment and for 6 months after your treatment ends. Male patients with partners of childbearing potential should also use effective contraception during treatment and for 3 months after the last dose. · Tell your doctor if you have kidney disease, liver disease, heart disease, or any type of infection. · Do not breastfeed during treatment and for 2 weeks after your last dose of this medicine. · This medicine may cause the following problems:  ¨ Increased risk of heart attack or other heart problems  ¨ Kidney failure  ¨ Serious skin reactions  · This medicine could cause infertility. Talk with your doctor before using this medicine if you plan to have children. · This medicine may make you bleed, bruise, or get infections more easily. Take precautions to prevent illness and injury. Wash your hands often. · Your doctor will do lab tests at regular visits to check on the effects of this medicine. Keep all appointments. · Keep all medicine out of the reach of children. Never share your medicine with anyone.   Possible Side Effects While Using This Medicine:   Call your doctor right away if you notice any of these side effects:  · Allergic reaction: Itching or hives, swelling in your face or hands, swelling or tingling in your mouth or throat, chest tightness, trouble breathing  · Blistering, peeling, red skin rash  · Chest pain that may spread to your arms, jaw, back, or neck, trouble breathing, nausea, unusual sweating, faintness  · Diarrhea 4 or more times each day, diarrhea at night, bloody bowel movements, stomach pain  · Fast, pounding, or uneven heartbeat  · Fever of at least 100.5° or other signs of infection, such as chills, cough, sore throat, and body aches  · Redness, pain, swelling, or blisters on your hands or feet, loss of fingerprints  · Severe nausea, or vomiting more than 2 times in 24 hours  · Sores or white patches on your lips, mouth, or throat  · Swelling in your hands, ankles, or feet, unusual tiredness  · Unusual bleeding, bruising, or weakness, pale skin  · Yellow skin or eyes  If you notice these less serious side effects, talk with your doctor:   · Hair loss  · Loss of appetite, weight loss  · Mild diarrhea, constipation, nausea, vomiting, or stomach pain  If you notice other side effects that you think are caused by this medicine, tell your doctor. Call your doctor for medical advice about side effects. You may report side effects to FDA at 3-745-FDA-5606  © 2017 [address] Information is for End User's use only and may not be sold, redistributed or otherwise used for commercial purposes. The above information is an  only. It is not intended as medical advice for individual conditions or treatments. Talk to your doctor, nurse or pharmacist before following any medical regimen to see if it is safe and effective for you. Learning About Hand-Foot Syndrome From Chemotherapy  What is hand-foot syndrome? Hand-foot syndrome is a side effect of certain kinds of chemotherapy. Some common chemotherapy medicines used to treat cancer can cause short-term damage to the skin cells and tiny blood vessels in your hands and feet. What are the symptoms? Symptoms can occur on your skin, most often on the palms of your hands and the soles of your feet. These symptoms may include:  · Pain. · Redness. · Swelling. · Tingling or burning. · Tenderness. Severe cases can cause the skin to crack, peel, or have blisters. Symptoms can sometimes occur in other areas, such as the knees or elbows, but this is less common. Hand-foot syndrome can make it hard to use your hands and feet to do daily activities. Symptoms usually go away after chemotherapy treatment is finished.   How is hand-foot syndrome treated? To treat hand-foot syndrome, your doctor may recommend lowering the dose or changing the schedule of your chemotherapy. In some cases, chemotherapy may be stopped until the symptoms get better. Medicines may be used to help relieve symptoms. They include:  · Medicines you put on your skin to reduce inflammation, such as corticosteroid creams. · Medicines you put on your skin for pain. These may be given as a cream or a patch on the skin. · Pills for pain or inflammation, such as ibuprofen or naproxen. · Moisturizing creams. Your doctor may prescribe a cream or recommend an over-the-counter (OTC) cream.  Placing ice packs or a cool, wet towel under your hands and feet when you get certain chemotherapy medicines may help. Always put a towel between the ice pack and your skin. Talk to your doctor about this treatment. How can you care for yourself at home? You can do things at home to relieve your symptoms and keep them from getting worse. To help protect your skin:  · Don't expose your hands and feet to hot water. Take cool showers and baths instead of using hot water. Rinse dishes in lukewarm water. When you dry your hands and feet, pat your skin gently with a cloth or towel. · Avoid being exposed to the sun for long periods of time. And avoid other sources of heat, such as saunas. · Don't do activities that cause rubbing or put force on your hands and feet, such as tennis or jogging. · Avoid using household tools that rub against your hands, such as screwdrivers or garden tools. · Avoid contact with household cleaning products, such as laundry detergent. · Wear loose-fitting, comfortable clothes and shoes. · Try to avoid walking barefoot. Wear soft slippers with non-slip bottoms around the house. To help soothe your skin:  · Cool the palms of your hands and soles of your feet with ice packs or cool water. Do this for about 15 minutes at a time.  Put a towel between the ice pack and your skin.  · Gently apply skin care lotion to keep your hands moist. Avoid rubbing or massaging lotion into your hands. This can cause friction. Be sure to let your doctor know about any new symptoms or changes in your skin. Follow-up care is a key part of your treatment and safety. Be sure to make and go to all appointments, and call your doctor if you are having problems. It's also a good idea to know your test results and keep a list of the medicines you take. Where can you learn more? Go to http://sandra-jenelle.info/. Enter Y152 in the search box to learn more about \"Learning About Hand-Foot Syndrome From Chemotherapy. \"  Current as of: May 12, 2017  Content Version: 11.4  © 8892-1378 [employer]. Care instructions adapted under license by [employer] (which disclaims liability or warranty for this information). If you have questions about a medical condition or this instruction, always ask your healthcare professional. [place] [address] any warranty or liability for your use of this information. Pancrelipase (By mouth)   Amylase (AM-i-lase), Lipase (LYE-pase), Protease (EVEM-ncy-qhl)  Helps patients who have cystic fibrosis, pancreatitis, or digestive disorders digest food. Brand Name(s): Creon, Pancreaze, Pertzye, Viokace, Zenpep   There may be other brand names for this medicine. When This Medicine Should Not Be Used: This medicine is not right for everyone. Do not use it if you had an allergic reaction to pancrelipase, pancreatin, or pork products. How to Use This Medicine:   Capsule, Delayed Release Capsule, Long Acting Capsule, Powder, Tablet, Coated Tablet  · Take your medicine as directed. Your dose may need to be changed several times to find what works best for you. · Drink a full glass of water with your medicine and plenty of fluids during the day. · Take this medicine with food as directed by your doctor.   · Viokace tablets are used in combination with medicines for stomach ulcers called proton pump inhibitors (including esomeprazole, lansoprazole, omeprazole). · If the capsule opens, avoid breathing in the powder. It can be irritating to your lungs. · Swallow the capsule or tablet whole. Do not let it dissolve in your mouth. Do not crush, break, or chew it. · Swallow the delayed-released capsule whole. Do not chew or crush it. If the capsule is too big to swallow, open it and put the contents in soft acidic food (including applesauce or yogurt). Swallow the mixture right away. Do not chew it. Do not mix the contents with alkaline foods or liquids, including milk. Drink plenty of water or juice to make sure all of the medicine has been swallowed. · When the delayed-released capsule is given to infants, it may be put directly into the mouth. It may also be opened and the contents mixed with a small amount of soft food (including applesauce). Do not mix the contents with alkaline foods or liquids, including breast milk or formula. Give 4 ounces of formula or breast milk immediately after the medicine. · Pertzye® capsules may also be given through a gastrostomy tube with a diameter of 14 [address] or larger. ¨ Mix the contents of the capsule with soft foods (including applesauce). ¨ Remove the plunger and carefully spoon the mixture into the syringe. Replace the plunger partially back. ¨ Connect the syringe into the tube feeding port and inject the medicine for 10 to 12 seconds. ¨ Flush the tube with 10 mL of water to rinse all of the medicine into the stomach. · Throw away any unused portions of the mixture. Do not save for later use. · Take the powder with meals. Avoid breathing in the powder. It can be irritating to your lungs. · Carefully follow your doctor's instructions about any special diet. · This medicine should come with a Medication Guide. Ask your pharmacist for a copy if you do not have one. · Missed dose:  If you miss a dose of this medicine, skip the missed dose and take your next dose at your usual time. Do not take 2 doses at the same time. · Store the medicine in a closed container at room temperature, away from heat, moisture, and direct light. Store the delayed-release capsules in a tightly closed container to protect them from moisture. Drugs and Foods to Avoid:   Ask your doctor or pharmacist before using any other medicine, including over-the-counter medicines, vitamins, and herbal products. · Some foods and medicines can affect how pancrelipase works. Tell your doctor if you use iron supplements or vitamins that contain iron. Warnings While Using This Medicine:   · Tell your doctor if you are pregnant or breastfeeding, or if you have kidney problems, gout, high uric acid in the blood or urine (hyperuricemia or hyperuricosuria), severe pancreas disease, or bowel problems. Tell your doctor if you have trouble swallowing capsules or a history of problems with blood sugar levels. · This medicine may cause the following problems:  ¨ Fibrosing colonopathy (serious bowel disorder)  ¨ High uric acid levels  · Tell your doctor if you are lactose intolerant. Viokace tablets contain lactose, which can make this condition worse. · This medicine is made from the pancreas of pigs. The risk of getting a virus from medicines made of pig organs has been greatly reduced in recent years. This is the result of required testing for certain viruses and testing during the making of these medicines. Although the risk is low, talk with your doctor if you have concerns. · Keep all medicine out of the reach of children. Never share your medicine with anyone.   Possible Side Effects While Using This Medicine:   Call your doctor right away if you notice any of these side effects:  · Allergic reaction: Itching or hives, swelling in your face or hands, swelling or tingling in your mouth or throat, chest tightness, trouble breathing  · Joint pain  · Severe stomach pain, bloating, constipation, diarrhea, nausea, or vomiting  · Swelling in your feet or lower legs  If you notice these less serious side effects, talk with your doctor:   · Cough  · Headache  · Irritation of the inside of the mouth  · Mild diarrhea or stomach upset  · Nosebleeds  If you notice other side effects that you think are caused by this medicine, tell your doctor. Call your doctor for medical advice about side effects. You may report side effects to FDA at 3-234-IGC-0887  © 2017 [address] Information is for End User's use only and may not be sold, redistributed or otherwise used for commercial purposes. The above information is an  only. It is not intended as medical advice for individual conditions or treatments. Talk to your doctor, nurse or pharmacist before following any medical regimen to see if it is safe and effective for you.

## 2017-11-09 NOTE — PROGRESS NOTES
Annabelle Khan is a 65 y.o. male new patient referred S/P whipple on 10/6/17 and for eval.  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 Port placed on 11/7/17 by Dr. José Wellington. Patient accompanied by spouse to today's visit. Patient's spouse stated patient vomited x 1 on 10/7. Patient's spouse stated patient vomited x 3 today; pt noted vomiting prior to being roomed by nurse; pt stated he he vomited yellowish fluid. Patient given emesis bag.  VS stable. Patient denies pain at this time.

## 2017-11-09 NOTE — MR AVS SNAPSHOT
Visit Information Date & Time Provider Department Dept. Phone Encounter #  
 11/9/2017  3:30 PM Brandon Leigh [facility] Oncology at [facility] [phone] [id number] Upcoming Health Maintenance Date Due COLONOSCOPY 1/24/2011 Pneumococcal 19-64 Highest Risk (3 of 3 - PPSV23) 1/4/2022 DTaP/Tdap/Td series (2 - Td) 1/7/2023 Allergies as of 11/9/2017  Review Complete On: 11/9/2017 By: Shad Vazquez LPN Severity Noted Reaction Type Reactions Other Food High 09/29/2017    Anaphylaxis SOFT SHELL CRABS Pcn [Penicillins]  07/23/2017    Other (comments) Pt stated \"always been told PCN\" Current Immunizations  Never Reviewed Name Date Pneumococcal Conjugate (PCV-13) 10/26/2017 Pneumococcal Polysaccharide (PPSV-23) 1/4/2017 Not reviewed this visit Vitals BP Pulse Temp Resp Height(growth percentile) Weight(growth percentile) 152/76 (BP 1 Location: Left arm, BP Patient Position: Sitting) 83 98.1 °F (36.7 °C) (Oral) 16 5' 8\" (1.727 m) 182 lb 9.6 oz (82.8 kg) BMI Smoking Status 27.76 kg/m2 Never Smoker Vitals History BMI and BSA Data Body Mass Index Body Surface Area  
 27.76 kg/m 2 1.99 m 2 Preferred Pharmacy Pharmacy Name Phone [facility]PHARM[facility] [address] [place] [address] [phone] Your Updated Medication List  
  
   
This list is accurate as of: 11/9/17  3:38 PM.  Always use your most recent med list.  
  
  
  
  
 amiodarone 200 mg tablet Commonly known as:  CORDARONE Take 1 Tab by mouth two (2) times a day. Indications: PREVENTION OF RECURRENT ATRIAL FIBRILLATION  
  
 aspirin 81 mg chewable tablet Take 81 mg by mouth nightly. CALCIUM 600 + D 600-125 mg-unit Tab Generic drug:  calcium-cholecalciferol (d3) Take  by mouth daily. cyanocobalamin 1,000 mcg/mL injection Commonly known as:  VITAMIN B12  
 1,000 mcg by IntraMUSCular route Twice a month. docusate sodium 100 mg capsule Commonly known as:  Hildegarde Berth Take 1 Cap by mouth two (2) times daily as needed for Constipation. gabapentin 300 mg capsule Commonly known as:  NEURONTIN Take 3 Caps by mouth two (2) times a day. 3 x 300mg caps (900mg) twice daily HYDROcodone-acetaminophen 5-325 mg per tablet Commonly known as:  NORCO  
TAKE 1-2 TABLETS BY MOUTH EVERY 4 HOURS AS NEEDED  
  
 magnesium 200 mg Tab Take  by mouth daily. oxyCODONE-acetaminophen 5-325 mg per tablet Commonly known as:  PERCOCET Take 1 Tab by mouth every four (4) hours as needed for Pain. Max Daily Amount: 6 Tabs. ramipril 10 mg capsule Commonly known as:  ALTACE Take 1 Cap by mouth nightly. VITAMIN D3 1,000 unit Cap Generic drug:  cholecalciferol Take 5,000 Units by mouth daily. ZyrTEC 10 mg tablet Generic drug:  cetirizine Take  by mouth nightly. Indications: AUTOIMMUNE DISORDER, SJOGRENS. Patient Instructions You will need Xeloda to be taken orally 14 days on and 7 days off. Capecitabine (By mouth) Capecitabine (Yady Ni) Treats cancer, including breast and colorectal cancer. Brand Name(s): Xeloda There may be other brand names for this medicine. When This Medicine Should Not Be Used: This medicine is not right for everyone. Do not use it if you had an allergic reaction to capecitabine or 5-fluorouracil, you are pregnant, or you have DPD deficiency. How to Use This Medicine:  
Tablet · Medicines used to treat cancer are very strong and can have many side effects. Before receiving this medicine, make sure you understand all the risks and benefits. It is important for you to work closely with your doctor during your treatment. · Take your medicine as directed. Your dose may need to be changed several times to find what works best for you. · Take this medicine with food or within 30 minutes after you eat. · Swallow the tablet whole with water. Do not cut, crush, break, or chew it. If the tablet must be cut or crushed, it should be done by a pharmacist. 
· Read and follow the patient instructions that come with this medicine. Talk to your doctor or pharmacist if you have any questions. · Missed dose: Take a dose as soon as you remember. If it is almost time for your next dose, wait until then and take a regular dose. Do not take extra medicine to make up for a missed dose. · Store the medicine in a closed container at room temperature, away from heat, moisture, and direct light. Drugs and Foods to Avoid: Ask your doctor or pharmacist before using any other medicine, including over-the-counter medicines, vitamins, and herbal products. · Some medicines can affect how capecitabine works. Tell your doctor if you are using a blood thinner (including phenprocoumon or warfarin),  leucovorin, or phenytoin. Warnings While Using This Medicine: · This medicine may cause birth defects if either partner is using it during conception or pregnancy. Tell your doctor right away if you or your partner becomes pregnant. Female patients must use effective birth control during treatment and for 6 months after your treatment ends. Male patients with partners of childbearing potential should also use effective contraception during treatment and for 3 months after the last dose. · Tell your doctor if you have kidney disease, liver disease, heart disease, or any type of infection. · Do not breastfeed during treatment and for 2 weeks after your last dose of this medicine. · This medicine may cause the following problems: 
¨ Increased risk of heart attack or other heart problems ¨ Kidney failure ¨ Serious skin reactions · This medicine could cause infertility. Talk with your doctor before using this medicine if you plan to have children. · This medicine may make you bleed, bruise, or get infections more easily. Take precautions to prevent illness and injury. Wash your hands often. · Your doctor will do lab tests at regular visits to check on the effects of this medicine. Keep all appointments. · Keep all medicine out of the reach of children. Never share your medicine with anyone. Possible Side Effects While Using This Medicine:  
Call your doctor right away if you notice any of these side effects: · Allergic reaction: Itching or hives, swelling in your face or hands, swelling or tingling in your mouth or throat, chest tightness, trouble breathing · Blistering, peeling, red skin rash · Chest pain that may spread to your arms, jaw, back, or neck, trouble breathing, nausea, unusual sweating, faintness · Diarrhea 4 or more times each day, diarrhea at night, bloody bowel movements, stomach pain · Fast, pounding, or uneven heartbeat · Fever of at least 100.5° or other signs of infection, such as chills, cough, sore throat, and body aches · Redness, pain, swelling, or blisters on your hands or feet, loss of fingerprints · Severe nausea, or vomiting more than 2 times in 24 hours · Sores or white patches on your lips, mouth, or throat · Swelling in your hands, ankles, or feet, unusual tiredness · Unusual bleeding, bruising, or weakness, pale skin · Yellow skin or eyes If you notice these less serious side effects, talk with your doctor:  
· Hair loss · Loss of appetite, weight loss · Mild diarrhea, constipation, nausea, vomiting, or stomach pain If you notice other side effects that you think are caused by this medicine, tell your doctor. Call your doctor for medical advice about side effects. You may report side effects to FDA at 1-464-FDA-4694 © 2017 [address] Information is for End User's use only and may not be sold, redistributed or otherwise used for commercial purposes. The above information is an  only. It is not intended as medical advice for individual conditions or treatments. Talk to your doctor, nurse or pharmacist before following any medical regimen to see if it is safe and effective for you. Learning About Hand-Foot Syndrome From Chemotherapy What is hand-foot syndrome? Hand-foot syndrome is a side effect of certain kinds of chemotherapy. Some common chemotherapy medicines used to treat cancer can cause short-term damage to the skin cells and tiny blood vessels in your hands and feet. What are the symptoms? Symptoms can occur on your skin, most often on the palms of your hands and the soles of your feet. These symptoms may include: 
· Pain. · Redness. · Swelling. · Tingling or burning. · Tenderness. Severe cases can cause the skin to crack, peel, or have blisters. Symptoms can sometimes occur in other areas, such as the knees or elbows, but this is less common. Hand-foot syndrome can make it hard to use your hands and feet to do daily activities. Symptoms usually go away after chemotherapy treatment is finished. How is hand-foot syndrome treated? To treat hand-foot syndrome, your doctor may recommend lowering the dose or changing the schedule of your chemotherapy. In some cases, chemotherapy may be stopped until the symptoms get better. Medicines may be used to help relieve symptoms. They include: · Medicines you put on your skin to reduce inflammation, such as corticosteroid creams. · Medicines you put on your skin for pain. These may be given as a cream or a patch on the skin. · Pills for pain or inflammation, such as ibuprofen or naproxen. · Moisturizing creams. Your doctor may prescribe a cream or recommend an over-the-counter (OTC) cream. 
Placing ice packs or a cool, wet towel under your hands and feet when you get certain chemotherapy medicines may help.  Always put a towel between the ice pack and your skin. Talk to your doctor about this treatment. How can you care for yourself at home? You can do things at home to relieve your symptoms and keep them from getting worse. To help protect your skin: · Don't expose your hands and feet to hot water. Take cool showers and baths instead of using hot water. Rinse dishes in lukewarm water. When you dry your hands and feet, pat your skin gently with a cloth or towel. · Avoid being exposed to the sun for long periods of time. And avoid other sources of heat, such as saunas. · Don't do activities that cause rubbing or put force on your hands and feet, such as tennis or jogging. · Avoid using household tools that rub against your hands, such as screwdrivers or garden tools. · Avoid contact with household cleaning products, such as laundry detergent. · Wear loose-fitting, comfortable clothes and shoes. · Try to avoid walking barefoot. Wear soft slippers with non-slip bottoms around the house. To help soothe your skin: · Cool the palms of your hands and soles of your feet with ice packs or cool water. Do this for about 15 minutes at a time. Put a towel between the ice pack and your skin. · Gently apply skin care lotion to keep your hands moist. Avoid rubbing or massaging lotion into your hands. This can cause friction. Be sure to let your doctor know about any new symptoms or changes in your skin. Follow-up care is a key part of your treatment and safety. Be sure to make and go to all appointments, and call your doctor if you are having problems. It's also a good idea to know your test results and keep a list of the medicines you take. Where can you learn more? Go to http://sandra-jenelle.info/. Enter G650 in the search box to learn more about \"Learning About Hand-Foot Syndrome From Chemotherapy. \" Current as of: May 12, 2017 Content Version: 11.4 © 5822-1020 [employer].  Care instructions adapted under license by [address] (which disclaims liability or warranty for this information). If you have questions about a medical condition or this instruction, always ask your healthcare professional. [address] any warranty or liability for your use of this information. Pancrelipase (By mouth) Amylase (AM-i-lase), Lipase (LYE-pase), Protease ([id number]) Helps patients who have cystic fibrosis, pancreatitis, or digestive disorders digest food. Brand Name(s): Creon, Pancreaze, Pertzye, Kalpana Peoples There may be other brand names for this medicine. When This Medicine Should Not Be Used: This medicine is not right for everyone. Do not use it if you had an allergic reaction to pancrelipase, pancreatin, or pork products. How to Use This Medicine:  
Capsule, Delayed Release Capsule, Long Acting Capsule, Powder, Tablet, Coated Tablet · Take your medicine as directed. Your dose may need to be changed several times to find what works best for you. · Drink a full glass of water with your medicine and plenty of fluids during the day. · Take this medicine with food as directed by your doctor. · Viokace tablets are used in combination with medicines for stomach ulcers called proton pump inhibitors (including esomeprazole, lansoprazole, omeprazole). · If the capsule opens, avoid breathing in the powder. It can be irritating to your lungs. · Swallow the capsule or tablet whole. Do not let it dissolve in your mouth. Do not crush, break, or chew it. · Swallow the delayed-released capsule whole. Do not chew or crush it. If the capsule is too big to swallow, open it and put the contents in soft acidic food (including applesauce or yogurt). Swallow the mixture right away. Do not chew it. Do not mix the contents with alkaline foods or liquids, including milk. Drink plenty of water or juice to make sure all of the medicine has been swallowed. · When the delayed-released capsule is given to infants, it may be put directly into the mouth. It may also be opened and the contents mixed with a small amount of soft food (including applesauce). Do not mix the contents with alkaline foods or liquids, including breast milk or formula. Give 4 ounces of formula or breast milk immediately after the medicine. · Pertzye® capsules may also be given through a gastrostomy tube with a diameter of [address] or larger. ¨ Mix the contents of the capsule with soft foods (including applesauce). ¨ Remove the plunger and carefully spoon the mixture into the syringe. Replace the plunger partially back. ¨ Connect the syringe into the tube feeding port and inject the medicine for 10 to 12 seconds. ¨ Flush the tube with 10 mL of water to rinse all of the medicine into the stomach. · Throw away any unused portions of the mixture. Do not save for later use. · Take the powder with meals. Avoid breathing in the powder. It can be irritating to your lungs. · Carefully follow your doctor's instructions about any special diet. · This medicine should come with a Medication Guide. Ask your pharmacist for a copy if you do not have one. · Missed dose: If you miss a dose of this medicine, skip the missed dose and take your next dose at your usual time. Do not take 2 doses at the same time. · Store the medicine in a closed container at room temperature, away from heat, moisture, and direct light. Store the delayed-release capsules in a tightly closed container to protect them from moisture. Drugs and Foods to Avoid: Ask your doctor or pharmacist before using any other medicine, including over-the-counter medicines, vitamins, and herbal products. · Some foods and medicines can affect how pancrelipase works. Tell your doctor if you use iron supplements or vitamins that contain iron. Warnings While Using This Medicine: · Tell your doctor if you are pregnant or breastfeeding, or if you have kidney problems, gout, high uric acid in the blood or urine (hyperuricemia or hyperuricosuria), severe pancreas disease, or bowel problems. Tell your doctor if you have trouble swallowing capsules or a history of problems with blood sugar levels. · This medicine may cause the following problems: ¨ Fibrosing colonopathy (serious bowel disorder) ¨ High uric acid levels · Tell your doctor if you are lactose intolerant. Viokace tablets contain lactose, which can make this condition worse. · This medicine is made from the pancreas of pigs. The risk of getting a virus from medicines made of pig organs has been greatly reduced in recent years. This is the result of required testing for certain viruses and testing during the making of these medicines. Although the risk is low, talk with your doctor if you have concerns. · Keep all medicine out of the reach of children. Never share your medicine with anyone. Possible Side Effects While Using This Medicine:  
Call your doctor right away if you notice any of these side effects: · Allergic reaction: Itching or hives, swelling in your face or hands, swelling or tingling in your mouth or throat, chest tightness, trouble breathing · Joint pain · Severe stomach pain, bloating, constipation, diarrhea, nausea, or vomiting · Swelling in your feet or lower legs If you notice these less serious side effects, talk with your doctor: · Cough · Headache · Irritation of the inside of the mouth · Mild diarrhea or stomach upset · Nosebleeds If you notice other side effects that you think are caused by this medicine, tell your doctor. Call your doctor for medical advice about side effects. You may report side effects to FDA at 4-736-FDA-7302 © 2017 [facility] Information is for End User's use only and may not be sold, redistributed or otherwise used for commercial purposes. The above information is an  only. It is not intended as medical advice for individual conditions or treatments. Talk to your doctor, nurse or pharmacist before following any medical regimen to see if it is safe and effective for you. Introducing [facility]! Dear Tara Murry: Thank you for requesting a [employer] account. Our records indicate that you already have an active [employer] account. You can access your account anytime at [email or web address] Did you know that you can access your hospital and ER discharge instructions at any time in [employer]? You can also review all of your test results from your hospital stay or ER visit. Additional Information If you have questions, please visit the Frequently Asked Questions section of the [employer] website at [email or web address] Remember, [employer] is NOT to be used for urgent needs. For medical emergencies, dial 911. Now available from your iPhone and Android! Please provide this summary of care documentation to your next provider. Your primary care clinician is listed as Danica Chester If you have any questions after today's visit, please call [phone].

## 2017-11-10 ENCOUNTER — PATIENT OUTREACH (OUTPATIENT)
Dept: OTHER | Age: 62
End: 2017-11-10

## 2017-11-10 DIAGNOSIS — C22.1 CHOLANGIOCARCINOMA OF BILIARY TRACT (HCC): Primary | ICD-10-CM

## 2017-11-10 DIAGNOSIS — C22.1 CHOLANGIOCARCINOMA OF BILIARY TRACT (HCC): ICD-10-CM

## 2017-11-10 RX ORDER — CAPECITABINE 500 MG/1
1250 TABLET, FILM COATED ORAL 2 TIMES DAILY
Qty: 140 TAB | Refills: 8 | Status: SHIPPED | OUTPATIENT
Start: 2017-11-10 | End: 2017-11-24

## 2017-11-10 RX ORDER — CAPECITABINE 500 MG/1
1250 TABLET, FILM COATED ORAL 2 TIMES DAILY
Qty: 140 TAB | Refills: 8 | Status: SHIPPED | OUTPATIENT
Start: 2017-11-10 | End: 2017-11-10 | Stop reason: SDUPTHER

## 2017-11-10 NOTE — PROGRESS NOTES
[facility] follow up - For CCM patient. 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 DC 10/16    Chart review:  Oncology Specialist appointment . Contacted patient for transitions of care services / [facility] pt for oncology services. Verified  and address for HIPAA security. * Mr. Charisma Murphy reports that he is doing very well and was very happy to hear that he can be on oral chemotherapy and not infusions.      - Discussed/ stressed hydration needs with Xeloda. - Reviewed side effects of neuralgias/ he has cortisone cream prescribed. - PICC Line placed - will remain for blood work/ but not needed for infusion at this time. * He is feeling stronger and his weight is stable. * Pancrease started to assist with digestion/  Help with vomiting. He has my contact information for need that arise prior to next call. Will follow for [facility] [place] services  ( close episode )  and [facility] needs- next call planned for  after Thanksgiving. Chart Review  You will need Xeloda to be taken orally 14 days on and 7 days off.      Capecitabine (By mouth)   Capecitabine ([id number])  Treats cancer, including breast and colorectal cancer.        /76 (BP 1 Location: Left arm, BP Patient Position: Sitting)     Pulse 83     Temp 98.1 °F (36.7 °C) (Oral)      Resp 16     Ht 5' 8\" (1.727 m)     Wt 182 lb 9.6 oz (82.8 kg)     BMI 27.76 kg/m2     BSA 1.99 m2

## 2017-11-13 NOTE — PROGRESS NOTES
Oncology Consultation Note        Patient: Miller Sharpe MRN: [id number]  SSN: [id number]    YOB: 1955  Age: 65 y.o. Sex: male      Subjective:      Miller Sharpe is a 65 y.o. male who I am seeing in consultation for a diagnosis of extrahepatic cholangiocarcinoma. He presented to the ED in August 2017 with obstructive jaundice. He was found to have an obstructive lesion in the dital bile duct. The CT showed marked intrahepatic biliary dilation and common bile duct dilation to the level of the mid common bile duct, which ws markedly narrowed. He underwent a stenting procedure followed by spyglass cholangiogram. The brushings revealed a diagnosis of cholangiocarcinoma. He underwent a whipple's procedure on 10/06/2017. He has recovered from the procedure. He still experiences occasional nausea and indigestion. He is maintaining weight. He is accompanied by his wife who teaches at the nursing school. Review of Systems:    Constitutional: negative  Eyes: negative  Ears, Nose, Mouth, Throat, and Face: negative  Respiratory: negative  Cardiovascular: negative  Gastrointestinal: nausea  Genitourinary:negative  Integument/Breast: negative  Hematologic/Lymphatic: negative  Musculoskeletal:negative  Neurological: negative    Past Medical History:   Diagnosis Date    Arrhythmia     Previous a.fib, ablation, NSR now; NO LONGER FOLLOWED BY CARDIOLOGIST.     Autoimmune disease ([address]     SJOGREN'S    Cancer ([address]     COMMON BILE DUCT, ADENOCARCINOMA    Hypertension     Sepsis ([address] 2017     Past Surgical History:   Procedure Laterality Date    HX GI      COLONOSCOPY, POLYPS (BENIGN)    HX GI  10/06/2017    [facility]     [address]  2005    Ablation     HX ORTHOPAEDIC  2000    Spinal fusion W/ HARDWARE    NEUROLOGICAL PROCEDURE UNLISTED  2005    Ting hole washout from cerebral hemorrhage      Family History   Problem Relation Age of Onset    Cancer Father      Breast and Colon    Cancer Mother      LEUKEMIA    No Known Problems Sister     No Known Problems Brother     No Known Problems Sister     Anesth Problems Neg Hx      Social History   Substance Use Topics    Smoking status: Never Smoker    Smokeless tobacco: Never Used    Alcohol use Yes      Comment: RARELY      Prior to Admission medications    Medication Sig Start Date End Date Taking? Authorizing Provider   HYDROcodone-acetaminophen (NORCO) 5-325 mg per tablet TAKE 1-2 TABLETS BY MOUTH EVERY 4 HOURS AS NEEDED 10/16/17  Yes Historical Provider   cyanocobalamin (VITAMIN B12) 1,000 mcg/mL injection 1,000 mcg by IntraMUSCular route Twice a month. Yes Historical Provider   oxyCODONE-acetaminophen (PERCOCET) 5-325 mg per tablet Take 1 Tab by mouth every four (4) hours as needed for Pain. Max Daily Amount: 6 Tabs. 11/7/17  Yes Maribel Irene MD   ramipril (ALTACE) 10 mg capsule Take 1 Cap by mouth nightly. 10/26/17  Yes Silas Raya III, DO   gabapentin (NEURONTIN) 300 mg capsule Take 3 Caps by mouth two (2) times a day. 3 x 300mg caps (900mg) twice daily 10/26/17  Yes Silas Raya III, DO   amiodarone (CORDARONE) 200 mg tablet Take 1 Tab by mouth two (2) times a day. Indications: PREVENTION OF RECURRENT ATRIAL FIBRILLATION  Patient taking differently: Take 200 mg by mouth daily. Indications: PREVENTION OF RECURRENT ATRIAL FIBRILLATION 10/16/17  Yes Maribel Irene MD   docusate sodium (COLACE) 100 mg capsule Take 1 Cap by mouth two (2) times daily as needed for Constipation. 10/16/17  Yes Maribel Irene MD   calcium-cholecalciferol, d3, (CALCIUM 600 + D) 600-125 mg-unit tab Take  by mouth daily. Yes Historical Provider   magnesium 200 mg tab Take  by mouth daily. Yes Historical Provider   cetirizine (ZYRTEC) 10 mg tablet Take  by mouth nightly. Indications: AUTOIMMUNE DISORDER, SJOGRENS. Yes Historical Provider   cholecalciferol (VITAMIN D3) 1,000 unit cap Take 5,000 Units by mouth daily. Yes Phys Other, MD   lipase-protease-amylase (CREON) 36,000-114,000- 180,000 unit cpDR Take 2 Caps by mouth three (3) times daily for 30 days. 11/10/17 12/10/17  Dillan Zapata NP   capecitabine (XELODA) 500 mg tablet Take 5 Tabs by mouth two (2) times a day for 14 days. Indications: cholangiocarcinoma 11/10/17 11/24/17  Dillan Zapata NP   aspirin 81 mg chewable tablet Take 81 mg by mouth nightly. Historical Provider              Allergies   Allergen Reactions    Other Food Anaphylaxis     SOFT SHELL CRABS    Pcn [Penicillins] Other (comments)     Pt stated \"always been told PCN\"           Objective:     Vitals:    11/09/17 1509   BP: 152/76   Pulse: 83   Resp: 16   Temp: 98.1 °F (36.7 °C)   TempSrc: Oral   Weight: 182 lb 9.6 oz (82.8 kg)   Height: 5' 8\" (1.727 m)            Physical Exam:    GENERAL: alert, cooperative, no distress, appears stated age  EYE: negative  LYMPHATIC: Cervical, supraclavicular, and axillary nodes normal.   THROAT & NECK: normal and no erythema or exudates noted. LUNG: clear to auscultation bilaterally  HEART: regular rate and rhythm  ABDOMEN: soft, non-tender  EXTREMITIES:  no edema  SKIN: Normal.  NEUROLOGIC: negative            Assessment:     1. Extrahepatic cholangiocarcinoma:    T3 N1 (1 of 12 LN +ve)  Invasion into pancreas  R0 resection    > S/P Pancreatoduodenectomy on  10/06/2017    I spent 90 minute with the patient in a face-to-face encounter. I explained him the stage of the disease, pathophysiology of the disease and the treatment approaches. I answered all his questions. More than 50% of the time was utilized in education, counseling and co-ordination of care. The standard of care for treatment of resected cholangiocarcinoma is adjuvant monotherapy capecitabine.      A phase III randomized clinical trial in 447 patients with biliary tract cancers showed that treating the disease with capecitabine after surgery extends survival by a median of 15 months compared to surgery alone. In the trial, 447 patients were randomly assigned to either treatment with capecitabine for 6 months or observation for recurrence of cancer. More than 80% of the patients were followed for at least 3 years with regular clinical exams, computed tomography imaging, and a variety of blood tests that could be useful later in determining biomarkers for tumors. Patients in the observation group lived a median of 36 months after surgery, whereas those who received capecitabine lived a median of 51 months. Capecitabine was associated with a 20% lower chance of death than observation, but the difference was not statistically significant for the overall population of 447 patients in the study, which includes patients who stopped capecitabine early. However, in the subgroup of 430 patients who received treatment per study protocol, capecitabine was associated with a 25% lower chance of death than observation, and this difference was statistically significant. The median time to cancer recurrence was 25 months for patients who received capecitabine and 18 months for patients in the control group. The most notable side effect related to treatment was a rash on the hands and feet, which is common with capecitabine. There were no deaths due to the use of capecitabine. [BILCAP study, 2017 ASCO Annual Meeting in [facility] (Abstract 4006)}. The patient's emotional well being was addressed during this office visit and patient seems to be coping well with the diagnosis and the treatment. 2. Exocrine pancreatic insufficiency    Pancreatic enzyme replacement         Plan:       1. Start Capecitabine 1250 mg/m2 PO BID   2. Return one to two weeks after starting therapy        Signed By: Rj Hahn MD     November 13, 2017           CC. Corwin Harvey MD  CC. Faustina Duong MD  CC. Linnea Gordon MD  CC.  Dawit Jones MD

## 2017-11-20 ENCOUNTER — PATIENT OUTREACH (OUTPATIENT)
Dept: OTHER | Age: 62
End: 2017-11-20

## 2017-11-20 NOTE — PROGRESS NOTES
EVERT[place]  Wrap Up for CCM patient. Resolving current episode (Transitions of care complete). No further ED/UC or hospital admissions within 30 days post discharge. Patient attended follow-up appointments as directed. Chart review finds no entry since OV 11/09. Mr. Ganesh Gutiérrez has requested that I call on 11/29 for CCM. The family is on holiday with Thanksgiving and he requested no call from me until his return. [place] episode is complete after his inpatient admission. Will continue to offer care and support through CCM episode. Next planned call.   11/29

## 2017-11-21 ENCOUNTER — OFFICE VISIT (OUTPATIENT)
Dept: SURGERY | Age: 62
End: 2017-11-21

## 2017-11-21 VITALS
BODY MASS INDEX: 27.68 KG/M2 | OXYGEN SATURATION: 97 % | RESPIRATION RATE: 18 BRPM | SYSTOLIC BLOOD PRESSURE: 140 MMHG | TEMPERATURE: 98 F | HEIGHT: 68 IN | HEART RATE: 80 BPM | WEIGHT: 182.6 LBS | DIASTOLIC BLOOD PRESSURE: 80 MMHG

## 2017-11-21 DIAGNOSIS — C22.1 CHOLANGIOCARCINOMA OF BILIARY TRACT (HCC): ICD-10-CM

## 2017-11-21 DIAGNOSIS — K30 DELAYED GASTRIC EMPTYING: Primary | ICD-10-CM

## 2017-11-21 RX ORDER — METOCLOPRAMIDE 5 MG/1
5 TABLET ORAL
Qty: 90 TAB | Refills: 1 | Status: SHIPPED | OUTPATIENT
Start: 2017-11-21 | End: 2017-11-21 | Stop reason: SDUPTHER

## 2017-11-21 NOTE — PROGRESS NOTES
1. Have you been to the ER, urgent care clinic since your last visit? Hospitalized since your last visit? No    2. Have you seen or consulted any other health care providers outside of the [address] [place] [address] since your last visit? Include any pap smears or colon screening. No     complains of  Vomiting \"yellow bile on and off since 11/6/17. \"

## 2017-11-21 NOTE — MR AVS SNAPSHOT
Visit Information Date & Time Provider Department Dept. Phone Encounter #  
 11/21/2017  3:45 PM MD Eric Guidry [phone] [id number] Follow-up Instructions Return in about 2 months (around 1/21/2018). Routing History Your Appointments 1/23/2018  2:00 PM  
ESTABLISHED PATIENT with MD Eric Guidry [id number] [facility] Appt Note: Ep/ 2 month f/u  
 [address] [facility] [id number]  
[address] [facility] Upcoming Health Maintenance Date Due COLONOSCOPY 1/24/2011 Pneumococcal 19-64 Highest Risk (3 of 3 - PPSV23) 1/4/2022 DTaP/Tdap/Td series (2 - Td) 1/7/2023 Allergies as of 11/21/2017  Review Complete On: 11/21/2017 By: Lauren Montes LPN Severity Noted Reaction Type Reactions Other Food High 09/29/2017    Anaphylaxis SOFT SHELL CRABS Pcn [Penicillins]  07/23/2017    Other (comments) Pt stated \"always been told PCN\" Current Immunizations  Never Reviewed Name Date Pneumococcal Conjugate (PCV-13) 10/26/2017 Pneumococcal Polysaccharide (PPSV-23) 1/4/2017 Not reviewed this visit You Were Diagnosed With   
  
 Codes Comments Delayed gastric emptying    -  Primary ICD-10-CM: K30 ICD-9-CM: 536.8 [facility]angi[facility] of biliary tract ([facility]     ICD-10-CM: C24.9 ICD-9-CM: 156.9 Vitals BP Pulse Temp Resp Height(growth percentile) Weight(growth percentile) 140/80 (BP 1 Location: Left arm, BP Patient Position: Sitting) 80 98 °F (36.7 °C) (Oral) 18 5' 8\" (1.727 m) 182 lb 9.6 oz (82.8 kg) SpO2 BMI Smoking Status 97% 27.76 kg/m2 Never Smoker Vitals History BMI and BSA Data Body Mass Index Body Surface Area  
 27.76 kg/m 2 1.99 m 2 Preferred Pharmacy Pharmacy Name Phone [facility] - [facility] [phone] Your Updated Medication List  
  
   
This list is accurate as of: 11/21/17 11:59 PM.  Always use your most recent med list.  
  
  
  
  
 amiodarone 200 mg tablet Commonly known as:  CORDARONE Take 1 Tab by mouth two (2) times a day. Indications: PREVENTION OF RECURRENT ATRIAL FIBRILLATION  
  
 aspirin 81 mg chewable tablet Take 81 mg by mouth nightly. CALCIUM 600 + D 600-125 mg-unit Tab Generic drug:  calcium-cholecalciferol (d3) Take  by mouth daily. capecitabine 500 mg tablet Commonly known as:  XELODA Take 5 Tabs by mouth two (2) times a day for 14 days. Indications: cholangiocarcinoma  
  
 cyanocobalamin 1,000 mcg/mL injection Commonly known as:  VITAMIN B12  
1,000 mcg by IntraMUSCular route Twice a month. docusate sodium 100 mg capsule Commonly known as:  Artie Barefoot Take 1 Cap by mouth two (2) times daily as needed for Constipation. gabapentin 300 mg capsule Commonly known as:  NEURONTIN Take 3 Caps by mouth two (2) times a day. 3 x 300mg caps (900mg) twice daily HYDROcodone-acetaminophen 5-325 mg per tablet Commonly known as:  NORCO  
TAKE 1-2 TABLETS BY MOUTH EVERY 4 HOURS AS NEEDED  
  
 lipase-protease-amylase 36,000-114,000- 180,000 unit Cpdr  
Commonly known as:  CREON Take 2 Caps by mouth three (3) times daily for 30 days. magnesium 200 mg Tab Take  by mouth daily. metoclopramide HCl 5 mg tablet Commonly known as:  REGLAN Take 1 Tab by mouth Before breakfast, lunch, and dinner for 60 days. oxyCODONE-acetaminophen 5-325 mg per tablet Commonly known as:  PERCOCET Take 1 Tab by mouth every four (4) hours as needed for Pain. Max Daily Amount: 6 Tabs. ramipril 10 mg capsule Commonly known as:  ALTACE Take 1 Cap by mouth nightly. VITAMIN D3 1,000 unit Cap Generic drug:  cholecalciferol Take 5,000 Units by mouth daily. ZyrTEC 10 mg tablet Generic drug:  cetirizine Take  by mouth nightly. Indications: AUTOIMMUNE DISORDER, SJOGRENS. Prescriptions Sent to Pharmacy Refills  
 metoclopramide HCl (REGLAN) 5 mg tablet (Discontinued) 1 Sig: Take 1 Tab by mouth Before breakfast, lunch, and dinner for 60 days. Class: Normal  
 Pharmacy: [address] [place] [address], [address] [place] [address] #: [phone] Route: Oral  
 Reason for Discontinue: Reorder Follow-up Instructions Return in about 2 months (around 1/21/2018). To-Do List   
 12/08/2017 2:00 PM  
  Appointment with CHAIR 2 [facility] ([phone]) Introducing [facility]! Dear Giovanni Brooks: Thank you for requesting a [facility] account. Our records indicate that you already have an active [facility] account. You can access your account anytime at [email or web address] Did you know that you can access your hospital and ER discharge instructions at any time in [facility]? You can also review all of your test results from your hospital stay or ER visit. Additional Information If you have questions, please visit the Frequently Asked Questions section of the [facility] website at [email or web address] Remember, [facility] is NOT to be used for urgent needs. For medical emergencies, dial 911. Now available from your iPhone and Android! Please provide this summary of care documentation to your next provider. Your primary care clinician is listed as Donna Kang If you have any questions after today's visit, please call [phone].

## 2017-11-22 ENCOUNTER — TELEPHONE (OUTPATIENT)
Dept: SURGERY | Age: 62
End: 2017-11-22

## 2017-11-22 NOTE — PROGRESS NOTES
[address] [facility] Surgery      Clinic Note - Follow up    Subjective     Navid Snyder returns today with complaints of vomiting. He states he has been vomiting yellow liquid about once per day over the past couple of weeks. He has not noted any food in the vomit. He is able to eat and drink but notes mostly at night that he has to vomit 'yellow bile.'  He is having 2 BM's per day that are well formed. He has no nausea associated with this. He otherwise feels ok. He denies any fevers. He has recently seen Dr. Fartun Bermudez with plans to start Xeloda monotherapy for adjuvant therapy. He is now off amiodarone. Objective     Visit Vitals    /80 (BP 1 Location: Left arm, BP Patient Position: Sitting)    Pulse 80    Temp 98 °F (36.7 °C) (Oral)    Resp 18    Ht 5' 8\" (1.727 m)    Wt 182 lb 9.6 oz (82.8 kg)    SpO2 97%    BMI 27.76 kg/m2         PE  GEN - Awake, alert, communicating appropriately. NAD  Pulm - CTAB. Right chest port incision well healed. CV - RRR  Abd - soft, NT, ND. Incision well healed. No evidence of infection. No palpable masses or organomegaly. Ext - warm, well perfused. Labs  None    Assessment     Navid Snyder is a 65 y. o.yr old male with cholangiocarcinoma of the distal bile duct. 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 His episodes of emesis seem consistent with delayed gastric emptying. I do not think he has pancreatic exocrine insufficiency as he is not having diarrhea. Plan     I have started the patient on TID Reglan 5mg before meals to see if this helps. I expect this to be temporary and will give him a 2 month supply. I will plan to see him back in 2 months but he will call if this vomiting persists. He has no symptoms to suggest dehydration related to the vomiting.   He should be ok to start Xeloda once this is available but I will discuss with Dr. Fartun Bermudez to confirm.       Petra Mcfarlane MD  11/21/17     CC: DO Dr. Natasha Sanabria

## 2017-11-24 ENCOUNTER — DOCUMENTATION ONLY (OUTPATIENT)
Dept: SURGERY | Age: 62
End: 2017-11-24

## 2017-11-27 ENCOUNTER — TELEPHONE (OUTPATIENT)
Dept: SURGERY | Age: 62
End: 2017-11-27

## 2017-11-27 DIAGNOSIS — K30 DELAYED GASTRIC EMPTYING: ICD-10-CM

## 2017-11-27 RX ORDER — METOCLOPRAMIDE 5 MG/1
10 TABLET ORAL
Qty: 180 TAB | Refills: 1 | Status: SHIPPED | OUTPATIENT
Start: 2017-11-27 | End: 2018-01-17 | Stop reason: ALTCHOICE

## 2017-11-27 RX ORDER — ONDANSETRON 8 MG/1
8 TABLET, ORALLY DISINTEGRATING ORAL
Qty: 40 TAB | Refills: 0 | Status: SHIPPED | OUTPATIENT
Start: 2017-11-27 | End: 2018-01-17 | Stop reason: ALTCHOICE

## 2017-11-28 ENCOUNTER — TELEPHONE (OUTPATIENT)
Dept: ONCOLOGY | Age: 62
End: 2017-11-28

## 2017-11-28 NOTE — TELEPHONE ENCOUNTER
Spoke with Mr Felipa Canada, he is expecting to get Xeloda in the mail today and was instructed to start taking on Monday 12/4/17. Pt verbalized understanding.

## 2017-11-29 ENCOUNTER — PATIENT OUTREACH (OUTPATIENT)
Dept: OTHER | Age: 62
End: 2017-11-29

## 2017-11-29 NOTE — PROGRESS NOTES
[facility] progress note    Called Mr. Cristina at agreed time. Verified  and address for HIPAA security. Changes since last call include:     Med changes [de-identified]  Started Reglan daily for better gastric emptying / and supplimental Zofran for residual nausea. Starting oral chemo with Xeloda. on    Doctors appointments :  Dr. Roro Kimbrough  ;   Planned visit next week for port flush & Blood work. * Mr. Chelsie Moore shares that increase nausea an vomiting prompted visit with Dr. Roro Kimbrough. Reglan worked well over [employer] but nausea returned on Sat. Increased dose of Reglan and he has taken Zofran twice for break through nausea. * No significant weight loss - and he notes no real appitite changes. * Anticipating start of Xeloda on Monday - oral agent.      - Reviewed previous prevention of side effects with hydration and cream for neuralgia already at home. Check in for the patients goals:    1) Goal    :  Knowledge and support of medication plan. A) Progress - Treatment of delayed gastric emptying improved/  Anticpate start of Xeloda on . B) Barriers addressed  - Symptom control / increased Reglan dose/ break through treated with Zofran. Meds in place for potential SE of Xeloda. Johanne Cm C) Utilizing strategy - preparation/ education. And support of his wife who is a RN. D) Plan for next check in - Next Thurs after start of new med. 2)  Goal    :  PCP / MD apts. A)  Progress -  Oncology and Surgery main consern at this time. Attending apts and treatment is in place. B) Barriers addressed - NO fu with PCP      C) Utilizing strategy  - Appropriate care for this time. D) Plan for next check in - Continued support and monitoring. Patient verbalized understanding of all information discussed. No needs for me to address at this time. Support offered as needed. Pt has my contact information for any further questions, concerns, or needs.     Plan for next call: Thus 12/7        Chart Review:     OV 11/21 with Dr. Gualberto Hastings  I have started the patient on TID Reglan 5mg before meals to see if this helps. I expect this to be temporary and will give him a 2 month supply. I will plan to see him back in 2 months but he will call if this vomiting persists. He has no symptoms to suggest dehydration related to the vomiting.   He should be ok to start Xeloda once this is available but I will discuss with Dr. Isabel Parikh to confirm.       Jesus Ames MD  11/21/17

## 2017-12-07 ENCOUNTER — PATIENT OUTREACH (OUTPATIENT)
Dept: OTHER | Age: 62
End: 2017-12-07

## 2017-12-07 NOTE — PROGRESS NOTES
CCM progress note    Called Mr. Cristina  at agreed time. Verified  and address for HIPAA security. Changes since last call include:     Med changes :  Taking oral chemotherapy - Xeloda. Doctors appointments :  Oncology  - Tomorrow    * Mr. Felipa Canada is doing well. On Xeloda now. Has meds for Periferal neuropathy as needed. - Side effects? \"Not bad\"    - Reglan and Zofran helping gastric empting delay. * Enjoying not having to go for infusions. * In a hurry to check in with his sons and grandchildren. * No identified CM issues at this time. - He feels that he will be fine/ busy holiday season with family   * Ready for apt tomorrow. Check in for the patients goals:    1) Goal    :  Care Coordination needs with chemotherapy  A) Progress -  Started Xeloda - no problems noted. B) Barriers addressed  - none identified. C) Utilizing strategy  - Give a month to see retest for effectiveness. D) Plan for next check in -  In January    Patient verbalized understanding of all information discussed. - He will call me after oncology visit if any issues arise. - His wife is a nurse. He feels very comfortable that his treatment plan is no the right track. Asks for next CM call in Mid .     Pt has my contact information for any further questions, concerns, or needs. Plan for next call:  - Check levels/ progression of treatment and/or disease.

## 2017-12-08 ENCOUNTER — HOSPITAL ENCOUNTER (OUTPATIENT)
Dept: INFUSION THERAPY | Age: 62
Discharge: HOME | End: 2017-12-08
Payer: COMMERCIAL

## 2017-12-08 ENCOUNTER — OFFICE VISIT (OUTPATIENT)
Dept: ONCOLOGY | Age: 62
End: 2017-12-08

## 2017-12-08 VITALS
OXYGEN SATURATION: 96 % | BODY MASS INDEX: 27.61 KG/M2 | RESPIRATION RATE: 16 BRPM | TEMPERATURE: 98.8 F | HEART RATE: 87 BPM | SYSTOLIC BLOOD PRESSURE: 124 MMHG | WEIGHT: 182.2 LBS | DIASTOLIC BLOOD PRESSURE: 75 MMHG | HEIGHT: 68 IN

## 2017-12-08 VITALS
OXYGEN SATURATION: 97 % | DIASTOLIC BLOOD PRESSURE: 78 MMHG | SYSTOLIC BLOOD PRESSURE: 130 MMHG | RESPIRATION RATE: 18 BRPM | HEART RATE: 78 BPM | TEMPERATURE: 98.4 F

## 2017-12-08 DIAGNOSIS — C22.1 CHOLANGIOCARCINOMA OF BILIARY TRACT (HCC): Primary | ICD-10-CM

## 2017-12-08 LAB
ALBUMIN SERPL-MCNC: 3.7 G/DL (ref 3.5–5)
ALBUMIN/GLOB SERPL: 1.4 {RATIO} (ref 1.1–2.2)
ALP SERPL-CCNC: 139 U/L (ref 45–117)
ALT SERPL-CCNC: 51 U/L (ref 12–78)
ANION GAP SERPL CALC-SCNC: 7 MMOL/L (ref 5–15)
AST SERPL-CCNC: 21 U/L (ref 15–37)
BASOPHILS # BLD: 0 K/UL (ref 0–0.1)
BASOPHILS NFR BLD: 0 % (ref 0–1)
BILIRUB SERPL-MCNC: 1.6 MG/DL (ref 0.2–1)
BUN SERPL-MCNC: 19 MG/DL (ref 6–20)
BUN/CREAT SERPL: 20 (ref 12–20)
CALCIUM SERPL-MCNC: 8.2 MG/DL (ref 8.5–10.1)
CHLORIDE SERPL-SCNC: 105 MMOL/L (ref 97–108)
CO2 SERPL-SCNC: 27 MMOL/L (ref 21–32)
CREAT SERPL-MCNC: 0.96 MG/DL (ref 0.7–1.3)
EOSINOPHIL # BLD: 0.3 K/UL (ref 0–0.4)
EOSINOPHIL NFR BLD: 3 % (ref 0–7)
ERYTHROCYTE [DISTWIDTH] IN BLOOD BY AUTOMATED COUNT: 14.7 % (ref 11.5–14.5)
GLOBULIN SER CALC-MCNC: 2.7 G/DL (ref 2–4)
GLUCOSE SERPL-MCNC: 167 MG/DL (ref 65–100)
HCT VFR BLD AUTO: 38.5 % (ref 36.6–50.3)
HGB BLD-MCNC: 12.6 G/DL (ref 12.1–17)
LYMPHOCYTES # BLD: 1 K/UL (ref 0.8–3.5)
LYMPHOCYTES NFR BLD: 12 % (ref 12–49)
MCH RBC QN AUTO: 30.3 PG (ref 26–34)
MCHC RBC AUTO-ENTMCNC: 32.7 G/DL (ref 30–36.5)
MCV RBC AUTO: 92.5 FL (ref 80–99)
MONOCYTES # BLD: 0.5 K/UL (ref 0–1)
MONOCYTES NFR BLD: 6 % (ref 5–13)
NEUTS SEG # BLD: 6.5 K/UL (ref 1.8–8)
NEUTS SEG NFR BLD: 79 % (ref 32–75)
PLATELET # BLD AUTO: 135 K/UL (ref 150–400)
POTASSIUM SERPL-SCNC: 3.4 MMOL/L (ref 3.5–5.1)
PROT SERPL-MCNC: 6.4 G/DL (ref 6.4–8.2)
RBC # BLD AUTO: 4.16 M/UL (ref 4.1–5.7)
SODIUM SERPL-SCNC: 139 MMOL/L (ref 136–145)
WBC # BLD AUTO: 8.2 K/UL (ref 4.1–11.1)

## 2017-12-08 PROCEDURE — 85025 COMPLETE CBC W/AUTO DIFF WBC: CPT | Performed by: INTERNAL MEDICINE

## 2017-12-08 PROCEDURE — 77030012965 HC NDL HUBR BBMI -A

## 2017-12-08 PROCEDURE — 80053 COMPREHEN METABOLIC PANEL: CPT | Performed by: INTERNAL MEDICINE

## 2017-12-08 PROCEDURE — 36591 DRAW BLOOD OFF VENOUS DEVICE: CPT

## 2017-12-08 PROCEDURE — 74011250636 HC RX REV CODE- 250/636: Performed by: INTERNAL MEDICINE

## 2017-12-08 PROCEDURE — 36415 COLL VENOUS BLD VENIPUNCTURE: CPT | Performed by: INTERNAL MEDICINE

## 2017-12-08 RX ORDER — HEPARIN 100 UNIT/ML
500 SYRINGE INTRAVENOUS AS NEEDED
Status: ACTIVE | OUTPATIENT
Start: 2017-12-08 | End: 2017-12-09

## 2017-12-08 RX ORDER — RAMIPRIL 10 MG/1
10 CAPSULE ORAL
COMMUNITY
Start: 2017-12-01 | End: 2017-12-08 | Stop reason: SDUPTHER

## 2017-12-08 RX ORDER — SODIUM CHLORIDE 0.9 % (FLUSH) 0.9 %
10-40 SYRINGE (ML) INJECTION AS NEEDED
Status: ACTIVE | OUTPATIENT
Start: 2017-12-08 | End: 2017-12-09

## 2017-12-08 RX ADMIN — Medication 20 ML: at 13:41

## 2017-12-08 RX ADMIN — Medication 500 UNITS: at 13:41

## 2017-12-08 NOTE — PROGRESS NOTES
[facility] Note:  Arrived - 1330     Visit Vitals    /78 (BP 1 Location: Left arm, BP Patient Position: At rest)    Pulse 78    Temp 98.4 °F (36.9 °C)    Resp 18    SpO2 97%     Labs:   Recent Results (from the past 12 hour(s))   CBC WITH AUTOMATED DIFF    Collection Time: 12/08/17  1:43 PM   Result Value Ref Range    WBC 8.2 4.1 - 11.1 K/uL    RBC 4.16 4.10 - 5.70 M/uL    HGB 12.6 12.1 - 17.0 g/dL    HCT 38.5 36.6 - 50.3 %    MCV 92.5 80.0 - 99.0 FL    MCH 30.3 26.0 - 34.0 PG    MCHC 32.7 30.0 - 36.5 g/dL    RDW 14.7 (H) 11.5 - 14.5 %    PLATELET 728 (L) 941 - 400 K/uL    NEUTROPHILS 79 (H) 32 - 75 %    LYMPHOCYTES 12 12 - 49 %    MONOCYTES 6 5 - 13 %    EOSINOPHILS 3 0 - 7 %    BASOPHILS 0 0 - 1 %    ABS. NEUTROPHILS 6.5 1.8 - 8.0 K/UL    ABS. LYMPHOCYTES 1.0 0.8 - 3.5 K/UL    ABS. MONOCYTES 0.5 0.0 - 1.0 K/UL    ABS. EOSINOPHILS 0.3 0.0 - 0.4 K/UL    ABS. BASOPHILS 0.0 0.0 - 0.1 K/UL   METABOLIC PANEL, COMPREHENSIVE    Collection Time: 12/08/17  1:43 PM   Result Value Ref Range    Sodium 139 136 - 145 mmol/L    Potassium 3.4 (L) 3.5 - 5.1 mmol/L    Chloride 105 97 - 108 mmol/L    CO2 27 21 - 32 mmol/L    Anion gap 7 5 - 15 mmol/L    Glucose 167 (H) 65 - 100 mg/dL    BUN 19 6 - 20 MG/DL    Creatinine 0.96 0.70 - 1.30 MG/DL    BUN/Creatinine ratio 20 12 - 20      GFR est AA >60 >60 ml/min/1.73m2    GFR est non-AA >60 >60 ml/min/1.73m2    Calcium 8.2 (L) 8.5 - 10.1 MG/DL    Bilirubin, total 1.6 (H) 0.2 - 1.0 MG/DL    ALT (SGPT) 51 12 - 78 U/L    AST (SGOT) 21 15 - 37 U/L    Alk. phosphatase 139 (H) 45 - 117 U/L    Protein, total 6.4 6.4 - 8.2 g/dL    Albumin 3.7 3.5 - 5.0 g/dL    Globulin 2.7 2.0 - 4.0 g/dL    A-G Ratio 1.4 1.1 - 2.2       Assessment unremarkable no complaints or concerns voiced. Port accessed & flushed per protocol w/o difficulty. Kovacs needle removed. 1345 - Tolerated well. Pt denies any acute problems/changes. Discharged from [facility] ambulatory.  No distress.  Next appt: 1/5/18

## 2017-12-08 NOTE — PROGRESS NOTES
Follow-up Note      Patient: Blair New MRN: [id number]  SSN: [id number]    YOB: 1955  Age: 65 y.o. Sex: male        Subjective:      Blair New is a 65 y.o. male with a diagnosis of extrahepatic cholangiocarcinoma. He presented to the ED in August 2017 with obstructive jaundice. He was found to have an obstructive lesion in the dital bile duct. The CT showed marked intrahepatic biliary dilation and common bile duct dilation to the level of the mid common bile duct, which ws markedly narrowed. He underwent a stenting procedure followed by spyglass cholangiogram. The brushings revealed a diagnosis of cholangiocarcinoma. He underwent a whipple's procedure on 10/06/2017. He started Capecitabine on Monday and has some intermittent nausea but overall feels well. Review of Systems:    Constitutional: negative  Eyes: negative  Ears, Nose, Mouth, Throat, and Face: negative  Respiratory: negative  Cardiovascular: negative  Gastrointestinal: nausea  Genitourinary:negative  Integument/Breast: negative  Hematologic/Lymphatic: negative  Musculoskeletal:negative  Neurological: negative        Past Medical History:   Diagnosis Date    Arrhythmia     Previous a.fib, ablation, NSR now; NO LONGER FOLLOWED BY CARDIOLOGIST.     Autoimmune disease ([address]     SJOGREN'S    Cancer ([address]     COMMON BILE DUCT, ADENOCARCINOMA    Hypertension     Sepsis ([address] 2017     Past Surgical History:   Procedure Laterality Date    HX GI      COLONOSCOPY, POLYPS (BENIGN)    HX GI  10/06/2017    [facility]     [address]  2005    Ablation     HX ORTHOPAEDIC  2000    Spinal fusion W/ HARDWARE    HX OTHER SURGICAL  11/07/2017    [place] [facility], Dr. Khadijah Garcia  2005    [place] hole washout from cerebral hemorrhage      Family History   Problem Relation Age of Onset    Cancer Father      Breast and Colon    Cancer Mother      LEUKEMIA    No Known Problems Sister  No Known Problems Brother     No Known Problems Sister     Anesth Problems Neg Hx      Social History   Substance Use Topics    Smoking status: Never Smoker    Smokeless tobacco: Never Used    Alcohol use Yes      Comment: RARELY      Prior to Admission medications    Medication Sig Start Date End Date Taking? Authorizing Provider   ondansetron (ZOFRAN ODT) 8 mg disintegrating tablet Take 1 Tab by mouth every eight (8) hours as needed for Nausea. 11/27/17  Yes Brian Herring MD   metoclopramide HCl (REGLAN) 5 mg tablet Take 2 Tabs by mouth Before breakfast, lunch, and dinner for 60 days. 11/27/17 1/26/18 Yes Brian Herring MD   cyanocobalamin (VITAMIN B12) 1,000 mcg/mL injection 1,000 mcg by IntraMUSCular route Twice a month. Yes Historical Provider   ramipril (ALTACE) 10 mg capsule Take 1 Cap by mouth nightly. 10/26/17  Yes Silas Raya III, DO   gabapentin (NEURONTIN) 300 mg capsule Take 3 Caps by mouth two (2) times a day. 3 x 300mg caps (900mg) twice daily 10/26/17  Yes Silas Raya III, DO   docusate sodium (COLACE) 100 mg capsule Take 1 Cap by mouth two (2) times daily as needed for Constipation. 10/16/17  Yes Brian Herring MD   calcium-cholecalciferol, d3, (CALCIUM 600 + D) 600-125 mg-unit tab Take  by mouth daily. Yes Historical Provider   magnesium 200 mg tab Take  by mouth daily. Yes Historical Provider   cetirizine (ZYRTEC) 10 mg tablet Take  by mouth nightly. Indications: AUTOIMMUNE DISORDER, SJOGRENS. Yes Historical Provider   lipase-protease-amylase (CREON) 36,000-114,000- 180,000 unit cpDR Take 2 Caps by mouth three (3) times daily for 30 days. 11/10/17 12/10/17  Jorge Stoll NP   HYDROcodone-acetaminophen (NORCO) 5-325 mg per tablet TAKE 1-2 TABLETS BY MOUTH EVERY 4 HOURS AS NEEDED 10/16/17   Historical Provider   oxyCODONE-acetaminophen (PERCOCET) 5-325 mg per tablet Take 1 Tab by mouth every four (4) hours as needed for Pain. Max Daily Amount: 6 Tabs. 11/7/17   Quinten Smith MD   amiodarone (CORDARONE) 200 mg tablet Take 1 Tab by mouth two (2) times a day. Indications: PREVENTION OF RECURRENT ATRIAL FIBRILLATION  Patient taking differently: Take 200 mg by mouth daily. Indications: PREVENTION OF RECURRENT ATRIAL FIBRILLATION 10/16/17   Quinten Smith MD   aspirin 81 mg chewable tablet Take 81 mg by mouth nightly. Historical Provider   cholecalciferol (VITAMIN D3) 1,000 unit cap Take 5,000 Units by mouth daily. Phys Kimberlee, MD          Allergies   Allergen Reactions    Other Food Anaphylaxis     SOFT SHELL CRABS    Pcn [Penicillins] Other (comments)     Pt stated \"always been told PCN\"           Objective:     Vitals:    12/08/17 1439   BP: 124/75   Pulse: 87   Resp: 16   Temp: 98.8 °F (37.1 °C)   TempSrc: Oral   SpO2: 96%   Weight: 182 lb 3.2 oz (82.6 kg)   Height: 5' 8\" (1.727 m)            Physical Exam:    GENERAL: alert, cooperative, no distress, appears stated age  EYE: negative  LYMPHATIC: Cervical, supraclavicular, and axillary nodes normal.   THROAT & NECK: normal and no erythema or exudates noted. LUNG: clear to auscultation bilaterally  HEART: regular rate and rhythm  ABDOMEN: soft, non-tender  EXTREMITIES:  no edema  SKIN: Normal.  NEUROLOGIC: negative        Assessment:     1. Extrahepatic cholangiocarcinoma:    T3 N1 (1 of 12 LN +ve)  Invasion into pancreas  R0 resection    > S/P Pancreatoduodenectomy on  10/06/2017    On Capecitabine 1250 mg/m2 PO BID - started 12/4/2017    Receiving adjuvant monotherapy    Capecitabine - Cycle 1 Day 5    Tolerating treatment   A detailed system by system evaluation of side effect was performed to assess chemotherapy related toxicity. Blood counts are acceptable. Results reviewed with the patient. Symptom management form reviewed with patient. Plan:       > Continue Capecitabine  > Follow-up in 4 weeks        Signed by: Zi Law MD                     December 10, 2017          CC. Tiffany Canada MD  CC. Albert Freeman MD  CC. Petey Ni MD  CC.  Alison Byers MD

## 2017-12-08 NOTE — PROGRESS NOTES
Blair New is a 65 y.o. male here today for Extrahepatic Cholangiocarcinoma f/u. Whipple 10/6/17. Xeloda; patient started taking medication this past Monday. VS stable. Patient denies pain. Patient denies N/V/D and constipation at this time. Patient states he has N/V about 2 times per week since he started taking the antiemetic. Blood pressure 124/75, pulse 87, temperature 98.8 °F (37.1 °C), temperature source Oral, resp. rate 16, height 5' 8\" (1.727 m), weight 182 lb 3.2 oz (82.6 kg), SpO2 96 %.

## 2017-12-20 ENCOUNTER — DOCUMENTATION ONLY (OUTPATIENT)
Dept: SURGERY | Age: 62
End: 2017-12-20

## 2017-12-20 ENCOUNTER — TELEPHONE (OUTPATIENT)
Dept: SURGERY | Age: 62
End: 2017-12-20

## 2017-12-20 NOTE — TELEPHONE ENCOUNTER
Mohan Beverly (spouse) stated that she needs Dr. Giovana Jackson to contact the patient's insurance for reauthorization of Zofran.

## 2017-12-20 NOTE — TELEPHONE ENCOUNTER
Returned Mrs. Cristina's call and informed her the pharmacy had not entered the rx, but had done it while I was on the phone and that they would give her a call when it was ready for . Mrs. Cristina expressed understanding of the above.

## 2017-12-20 NOTE — PROGRESS NOTES
Spoke to Pharmacy they re-entered the rx and it went through. They will contact patient when its ready for pickup.

## 2018-01-04 ENCOUNTER — OFFICE VISIT (OUTPATIENT)
Dept: DERMATOLOGY | Facility: AMBULATORY SURGERY CENTER | Age: 63
End: 2018-01-04

## 2018-01-04 ENCOUNTER — HOSPITAL ENCOUNTER (OUTPATIENT)
Dept: LAB | Age: 63
Discharge: HOME | End: 2018-01-04

## 2018-01-04 VITALS
SYSTOLIC BLOOD PRESSURE: 132 MMHG | RESPIRATION RATE: 14 BRPM | DIASTOLIC BLOOD PRESSURE: 78 MMHG | OXYGEN SATURATION: 96 % | HEART RATE: 85 BPM

## 2018-01-04 DIAGNOSIS — L82.1 SEBORRHEIC KERATOSES: ICD-10-CM

## 2018-01-04 DIAGNOSIS — L57.8 SUN-DAMAGED SKIN: ICD-10-CM

## 2018-01-04 DIAGNOSIS — D48.5 NEOPLASM OF UNCERTAIN BEHAVIOR OF SKIN OF BACK: ICD-10-CM

## 2018-01-04 DIAGNOSIS — D18.01 CHERRY ANGIOMA: ICD-10-CM

## 2018-01-04 DIAGNOSIS — D22.9 MULTIPLE BENIGN NEVI: ICD-10-CM

## 2018-01-04 DIAGNOSIS — L57.0 ACTINIC KERATOSIS: Primary | ICD-10-CM

## 2018-01-04 RX ORDER — FLUOROURACIL 50 MG/G
CREAM TOPICAL 2 TIMES DAILY
Qty: 40 G | Refills: 0 | Status: SHIPPED | OUTPATIENT
Start: 2018-01-04 | End: 2018-06-07

## 2018-01-04 NOTE — PROGRESS NOTES
Name: Gomez Barrett       Age: 65 y.o. Date: 1/4/2018    Chief Complaint:   Chief Complaint   Patient presents with    Skin Exam       Subjective:    HPI  Mr. Gomez Barrett is a 65 y.o. male who presents as a new patient to [place] [address] for a skin exam.  The patient was referred for this evaluation by self. The patient has had a skin exam in the past and the patient does have current complaints related to his skin. He reports scaly lesions on the scalp. This has been present for many months. He states similar lesions have been treated with cryotherapy in the past. Prior dermatology care in [place]. The patient's pertinent skin history includes: AK    ROS: Constitutional: Negative. Dermatological : positive for - skin lesion changes      Social History     Social History    Marital status:      Spouse name: N/A    Number of children: N/A    Years of education: N/A     Occupational History    Not on file. Social History Main Topics    Smoking status: Never Smoker    Smokeless tobacco: Never Used    Alcohol use Yes      Comment: RARELY    Drug use: No    Sexual activity: Not on file     Other Topics Concern    Not on file     Social History Narrative       Family History   Problem Relation Age of Onset    Cancer Father      Breast and Colon    Cancer Mother      LEUKEMIA    No Known Problems Sister     No Known Problems Brother     No Known Problems Sister     Anesth Problems Neg Hx        Past Medical History:   Diagnosis Date    Arrhythmia     Previous a.fib, ablation, NSR now; NO LONGER FOLLOWED BY CARDIOLOGIST.     Autoimmune disease ([address]     SJOGREN'S    Cancer ([address]     COMMON BILE DUCT, ADENOCARCINOMA    Hypertension     Sepsis ([address] 2017       Past Surgical History:   Procedure Laterality Date    HX GI      COLONOSCOPY, POLYPS (BENIGN)    HX GI  10/06/2017    [facility]     [address]  2005    Ablation  HX ORTHOPAEDIC  2000    Spinal fusion W/ HARDWARE    HX OTHER SURGICAL  11/07/2017    [place] [facility], Dr. Negin Braden  2005    [place] hole washout from cerebral hemorrhage       Current Outpatient Prescriptions   Medication Sig Dispense Refill    fluorouracil (EFUDEX) 5 % chemo cream Apply  to affected area two (2) times a day. 40 g 0    ondansetron (ZOFRAN ODT) 8 mg disintegrating tablet Take 1 Tab by mouth every eight (8) hours as needed for Nausea. 40 Tab 0    metoclopramide HCl (REGLAN) 5 mg tablet Take 2 Tabs by mouth Before breakfast, lunch, and dinner for 60 days. 180 Tab 1    HYDROcodone-acetaminophen (NORCO) 5-325 mg per tablet TAKE 1-2 TABLETS BY MOUTH EVERY 4 HOURS AS NEEDED  0    cyanocobalamin (VITAMIN B12) 1,000 mcg/mL injection 1,000 mcg by IntraMUSCular route Twice a month.  oxyCODONE-acetaminophen (PERCOCET) 5-325 mg per tablet Take 1 Tab by mouth every four (4) hours as needed for Pain. Max Daily Amount: 6 Tabs. 20 Tab 0    ramipril (ALTACE) 10 mg capsule Take 1 Cap by mouth nightly. 90 Cap 3    gabapentin (NEURONTIN) 300 mg capsule Take 3 Caps by mouth two (2) times a day. 3 x 300mg caps (900mg) twice daily 540 Cap 3    amiodarone (CORDARONE) 200 mg tablet Take 1 Tab by mouth two (2) times a day. Indications: PREVENTION OF RECURRENT ATRIAL FIBRILLATION (Patient taking differently: Take 200 mg by mouth daily. Indications: PREVENTION OF RECURRENT ATRIAL FIBRILLATION) 60 Tab 3    docusate sodium (COLACE) 100 mg capsule Take 1 Cap by mouth two (2) times daily as needed for Constipation. 60 Cap 0    calcium-cholecalciferol, d3, (CALCIUM 600 + D) 600-125 mg-unit tab Take  by mouth daily.  magnesium 200 mg tab Take  by mouth daily.  cetirizine (ZYRTEC) 10 mg tablet Take  by mouth nightly. Indications: AUTOIMMUNE DISORDER, SJOGRENS.  aspirin 81 mg chewable tablet Take 81 mg by mouth nightly.       cholecalciferol (VITAMIN D3) 1,000 unit cap Take 5,000 Units by mouth daily. Allergies   Allergen Reactions    Other Food Anaphylaxis     SOFT SHELL CRABS    Pcn [Penicillins] Other (comments)     Pt stated \"always been told PCN\"         Objective:    Visit Vitals    /78    Pulse 85    Resp 14    SpO2 96%       Maria Victoria Crew is a 65 y.o. male who appears well and in no distress. He is awake, alert, and oriented. There is no preauricular, submandibular, or cervical lymphadenopathy. A skin examination was performed including his scalp, face (including eyelid), ears, neck, chest, back, abdomen, upper extremities (including digits/nails), lower extremities, breasts, buttocks; genital skin was not examined. He has tanned skin on sun exposed areas. There are thin scaled AKs on the scalp. There are scattered cherry angiomas. There are stuck on waxy macules and keratotic papules consistent with seborrheic keratoses. There are a few scattered medium brown nevi that are junctional. One on the left lower back is 3 x 3 mm medium brown with a dark brown edge. Assessment/Plan:  1. Neoplasm of Uncertain Behavior, left lower back. The differential diagnoses were discussed. A shave removal was advised to address this lesion. The procedure was reviewed and verbal and written consent were obtained. The risks of pain, bleeding, infection, recurrence and scar were discussed. I performed the procedure. The site was cleansed and anesthetized with 1% Lidocaine with Epinephrine 1:100,000. A shave removal was performed to remove the lesion in its clinical entirety. Drysol was used for hemostasis. The wound was bandaged and care reviewed. The specimen was sent to pathology. I will contact the patient with the results and any further treatment that may be necessary. 2. Actinic keratoses. He will use 5-Fu on the scalp x 2 daily for 4 weeks. Use and side effects discussed. 3. Seborrheic keratoses.   The diagnosis was reviewed and the patient was reassured that no treatment is needed for these benign lesions. 4. Cherry angiomas. The diagnosis was reviewed and the patient was reassured that no treatment is needed for these benign lesions. 5. Normal nevi. The diagnosis of normal nevi was reviewed. I discussed sun protection, sunscreen use, the warning signs of skin cancer, the need for self-skin examinations, and the need for regular practitioner exams every 1 year. The patient should follow up sooner as needed if new, changing, or symptomatic skin lesions arise. [facility]   OFFICE PROCEDURE PROGRESS NOTE   Chart reviewed for the following:   Brandon SHERIDAN, have reviewed the History, Physical and updated the Allergic reactions for Fausto Marksan. TIME OUT performed immediately prior to start of procedure:   Karlee SHERIDAN, have performed the following reviews on Fausto Marksan   prior to the start of the procedure:     * Patient was identified by name and date of birth   * Agreement on procedure being performed was verified   * Risks and Benefits explained to the patient   * Procedure site verified and marked as necessary   * Patient was positioned for comfort   * Consent was signed and verified     Time: 1340  Date of procedure: 1/4/2018  Procedure performed by: Nikolas Leon.  Meagan Swanson  Provider assisted by: RN   Patient assisted by: self   How tolerated by patient: tolerated the procedure well with no complications   Comments: none

## 2018-01-05 ENCOUNTER — HOSPITAL ENCOUNTER (OUTPATIENT)
Dept: INFUSION THERAPY | Age: 63
Discharge: HOME | End: 2018-01-05
Payer: COMMERCIAL

## 2018-01-05 VITALS
RESPIRATION RATE: 18 BRPM | DIASTOLIC BLOOD PRESSURE: 76 MMHG | SYSTOLIC BLOOD PRESSURE: 134 MMHG | HEART RATE: 82 BPM | TEMPERATURE: 98 F

## 2018-01-05 LAB
ALBUMIN SERPL-MCNC: 3.7 G/DL (ref 3.5–5)
ALBUMIN/GLOB SERPL: 1.4 {RATIO} (ref 1.1–2.2)
ALP SERPL-CCNC: 128 U/L (ref 45–117)
ALT SERPL-CCNC: 48 U/L (ref 12–78)
ANION GAP SERPL CALC-SCNC: 7 MMOL/L (ref 5–15)
AST SERPL-CCNC: 16 U/L (ref 15–37)
BASOPHILS # BLD: 0 K/UL (ref 0–0.1)
BASOPHILS NFR BLD: 0 % (ref 0–1)
BILIRUB SERPL-MCNC: 1 MG/DL (ref 0.2–1)
BUN SERPL-MCNC: 21 MG/DL (ref 6–20)
BUN/CREAT SERPL: 20 (ref 12–20)
CALCIUM SERPL-MCNC: 8.6 MG/DL (ref 8.5–10.1)
CHLORIDE SERPL-SCNC: 107 MMOL/L (ref 97–108)
CO2 SERPL-SCNC: 28 MMOL/L (ref 21–32)
CREAT SERPL-MCNC: 1.07 MG/DL (ref 0.7–1.3)
EOSINOPHIL # BLD: 0.2 K/UL (ref 0–0.4)
EOSINOPHIL NFR BLD: 4 % (ref 0–7)
ERYTHROCYTE [DISTWIDTH] IN BLOOD BY AUTOMATED COUNT: 17.8 % (ref 11.5–14.5)
GLOBULIN SER CALC-MCNC: 2.6 G/DL (ref 2–4)
GLUCOSE SERPL-MCNC: 136 MG/DL (ref 65–100)
HCT VFR BLD AUTO: 37.1 % (ref 36.6–50.3)
HGB BLD-MCNC: 12.2 G/DL (ref 12.1–17)
LYMPHOCYTES # BLD: 1.1 K/UL (ref 0.8–3.5)
LYMPHOCYTES NFR BLD: 23 % (ref 12–49)
MCH RBC QN AUTO: 31.7 PG (ref 26–34)
MCHC RBC AUTO-ENTMCNC: 32.9 G/DL (ref 30–36.5)
MCV RBC AUTO: 96.4 FL (ref 80–99)
MONOCYTES # BLD: 0.4 K/UL (ref 0–1)
MONOCYTES NFR BLD: 7 % (ref 5–13)
NEUTS SEG # BLD: 3.1 K/UL (ref 1.8–8)
NEUTS SEG NFR BLD: 66 % (ref 32–75)
PLATELET # BLD AUTO: 172 K/UL (ref 150–400)
POTASSIUM SERPL-SCNC: 3.6 MMOL/L (ref 3.5–5.1)
PROT SERPL-MCNC: 6.3 G/DL (ref 6.4–8.2)
RBC # BLD AUTO: 3.85 M/UL (ref 4.1–5.7)
SODIUM SERPL-SCNC: 142 MMOL/L (ref 136–145)
WBC # BLD AUTO: 4.8 K/UL (ref 4.1–11.1)

## 2018-01-05 PROCEDURE — 85025 COMPLETE CBC W/AUTO DIFF WBC: CPT | Performed by: INTERNAL MEDICINE

## 2018-01-05 PROCEDURE — 77030012965 HC NDL HUBR BBMI -A

## 2018-01-05 PROCEDURE — 74011000250 HC RX REV CODE- 250: Performed by: INTERNAL MEDICINE

## 2018-01-05 PROCEDURE — 36591 DRAW BLOOD OFF VENOUS DEVICE: CPT

## 2018-01-05 PROCEDURE — 74011250636 HC RX REV CODE- 250/636: Performed by: INTERNAL MEDICINE

## 2018-01-05 PROCEDURE — 80053 COMPREHEN METABOLIC PANEL: CPT | Performed by: INTERNAL MEDICINE

## 2018-01-05 PROCEDURE — 36415 COLL VENOUS BLD VENIPUNCTURE: CPT | Performed by: INTERNAL MEDICINE

## 2018-01-05 RX ORDER — HEPARIN 100 UNIT/ML
500 SYRINGE INTRAVENOUS AS NEEDED
Status: ACTIVE | OUTPATIENT
Start: 2018-01-05 | End: 2018-01-06

## 2018-01-05 RX ORDER — SODIUM CHLORIDE 9 MG/ML
10 INJECTION INTRAMUSCULAR; INTRAVENOUS; SUBCUTANEOUS AS NEEDED
Status: ACTIVE | OUTPATIENT
Start: 2018-01-05 | End: 2018-01-06

## 2018-01-05 RX ORDER — SODIUM CHLORIDE 0.9 % (FLUSH) 0.9 %
10-40 SYRINGE (ML) INJECTION AS NEEDED
Status: ACTIVE | OUTPATIENT
Start: 2018-01-05 | End: 2018-01-06

## 2018-01-05 RX ADMIN — Medication 40 ML: at 14:18

## 2018-01-05 RX ADMIN — Medication 20 ML: at 14:17

## 2018-01-05 RX ADMIN — SODIUM CHLORIDE 10 ML: 9 INJECTION INTRAMUSCULAR; INTRAVENOUS; SUBCUTANEOUS at 14:17

## 2018-01-05 RX ADMIN — Medication 500 UNITS: at 14:19

## 2018-01-05 NOTE — PROGRESS NOTES
[facility] Note:  Arrived - 3477     Visit Vitals    /76 (BP 1 Location: Left arm, BP Patient Position: At rest)    Pulse 82    Temp 98 °F (36.7 °C)    Resp 18       Assessment - unchanged. Port accessed, blood return sluggish, but after multiple flushes was able to obtain labs- CBC with diff and CMP. Flushed per protocol w/o difficulty. Kovacs needle removed. Labs pending, see [facility] for results. 1420 - Tolerated well. Pt denies any acute problems/changes. Discharged from [place] ambulatory. No distress. Next appt: 2/2/18 @ 1400.

## 2018-01-08 NOTE — PROGRESS NOTES
I spoke with the patient and he is aware of the diagnosis of mild atypical nevus, cleared with biopsy. No further tx needed, f/up 1 yr.

## 2018-01-16 ENCOUNTER — PATIENT OUTREACH (OUTPATIENT)
Dept: OTHER | Age: 63
End: 2018-01-16

## 2018-01-17 ENCOUNTER — OFFICE VISIT (OUTPATIENT)
Dept: ONCOLOGY | Age: 63
End: 2018-01-17

## 2018-01-17 VITALS
DIASTOLIC BLOOD PRESSURE: 77 MMHG | HEIGHT: 68 IN | BODY MASS INDEX: 28.28 KG/M2 | SYSTOLIC BLOOD PRESSURE: 120 MMHG | WEIGHT: 186.6 LBS | HEART RATE: 80 BPM | TEMPERATURE: 98.3 F | OXYGEN SATURATION: 97 % | RESPIRATION RATE: 16 BRPM

## 2018-01-17 DIAGNOSIS — C22.1 CHOLANGIOCARCINOMA OF BILIARY TRACT (HCC): Primary | ICD-10-CM

## 2018-01-17 DIAGNOSIS — C24.9 CHOLANGIOCARCINOMA DETERMINED BY BIOPSY OF BILIARY TRACT (HCC): ICD-10-CM

## 2018-01-17 RX ORDER — PROCHLORPERAZINE MALEATE 10 MG
5 TABLET ORAL
Qty: 40 TAB | Refills: 1 | Status: SHIPPED | OUTPATIENT
Start: 2018-01-17 | End: 2018-01-24

## 2018-01-17 NOTE — PROGRESS NOTES
Follow-up Note      Patient: Mike Lo MRN: [id number]  SSN: [id number]    YOB: 1955  Age: 65 y.o. Sex: male      Diagnosis:     1. 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 S/P Pancreatoduodenectomy on  10/06/2017  2. Adjuvant monotherapy with Capecitabine - Cycle 3 Day 3   started 12/4/2017    Subjective:      Mike Lo is a 65 y.o. male with a diagnosis of extrahepatic cholangiocarcinoma. He presented to the ED in August 2017 with obstructive jaundice. He was found to have an obstructive lesion in the dital bile duct. The CT showed marked intrahepatic biliary dilation and common bile duct dilation to the level of the mid common bile duct, which ws markedly narrowed. He underwent a stenting procedure followed by spyglass cholangiogram. The brushings revealed a diagnosis of cholangiocarcinoma. He underwent a whipple's procedure on 10/06/2017. He is now on Xeloda. He has been eating well and maintaining his weight but notes vomiting yellow liquid once a day for the last several months. He was started on reglan and zofran by Dr. Paz Odell with no improvement. 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 Review of Systems:    Constitutional: negative  Eyes: negative  Ears, Nose, Mouth, Throat, and Face: negative  Respiratory: negative  Cardiovascular: negative  Gastrointestinal: nausea, vomiting  Genitourinary:negative  Integument/Breast: negative  Hematologic/Lymphatic: negative  Musculoskeletal:negative  Neurological: negative        Past Medical History:   Diagnosis Date    Arrhythmia     Previous a.fib, ablation, NSR now; NO LONGER FOLLOWED BY CARDIOLOGIST.     Autoimmune disease ([address]     SJOGREN'S    Cancer ([address]     COMMON BILE DUCT, ADENOCARCINOMA    Hypertension     Sepsis ([address] 2017     Past Surgical History:   Procedure Laterality Date    HX GI      COLONOSCOPY, POLYPS (BENIGN)  HX GI  10/06/2017    [facility]     HX HEART CATHETERIZATION  2005    Ablation     HX ORTHOPAEDIC  2000    Spinal fusion W/ HARDWARE    HX OTHER SURGICAL  11/07/2017    [place] [facility], Dr. Tamar Chen  2005    Ting hole washout from cerebral hemorrhage      Family History   Problem Relation Age of Onset    Cancer Father      Breast and Colon    Cancer Mother      LEUKEMIA    No Known Problems Sister     No Known Problems Brother     No Known Problems Sister     Anesth Problems Neg Hx      Social History   Substance Use Topics    Smoking status: Never Smoker    Smokeless tobacco: Never Used    Alcohol use Yes      Comment: RARELY      Prior to Admission medications    Medication Sig Start Date End Date Taking? Authorizing Provider   prochlorperazine (COMPAZINE) 10 mg tablet Take 0.5 Tabs by mouth every six (6) hours as needed for up to 7 days. 1/17/18 1/24/18 Yes Abiola Martin NP   fluorouracil (EFUDEX) 5 % chemo cream Apply  to affected area two (2) times a day. 1/4/18  Yes Barbara Gage NP   cyanocobalamin (VITAMIN B12) 1,000 mcg/mL injection 1,000 mcg by IntraMUSCular route Twice a month. Yes Historical Provider   ramipril (ALTACE) 10 mg capsule Take 1 Cap by mouth nightly. 10/26/17  Yes Silas Raya III, DO   gabapentin (NEURONTIN) 300 mg capsule Take 3 Caps by mouth two (2) times a day. 3 x 300mg caps (900mg) twice daily 10/26/17  Yes Silas Raya III, DO   calcium-cholecalciferol, d3, (CALCIUM 600 + D) 600-125 mg-unit tab Take  by mouth daily. Yes Historical Provider   magnesium 200 mg tab Take  by mouth daily. Yes Historical Provider   cetirizine (ZYRTEC) 10 mg tablet Take  by mouth nightly. Indications: AUTOIMMUNE DISORDER, SJOGRENS. Yes Historical Provider   aspirin 81 mg chewable tablet Take 81 mg by mouth nightly. Yes Historical Provider   cholecalciferol (VITAMIN D3) 1,000 unit cap Take 5,000 Units by mouth daily. Yes Phys Other, MD   HYDROcodone-acetaminophen (NORCO) 5-325 mg per tablet TAKE 1-2 TABLETS BY MOUTH EVERY 4 HOURS AS NEEDED 10/16/17   Historical Provider   oxyCODONE-acetaminophen (PERCOCET) 5-325 mg per tablet Take 1 Tab by mouth every four (4) hours as needed for Pain. Max Daily Amount: 6 Tabs. 11/7/17   Greg Pennington MD   amiodarone (CORDARONE) 200 mg tablet Take 1 Tab by mouth two (2) times a day. Indications: PREVENTION OF RECURRENT ATRIAL FIBRILLATION  Patient taking differently: Take 200 mg by mouth daily. Indications: PREVENTION OF RECURRENT ATRIAL FIBRILLATION 10/16/17   Greg Pennington MD   docusate sodium (COLACE) 100 mg capsule Take 1 Cap by mouth two (2) times daily as needed for Constipation. 10/16/17   Greg Pennington MD          Allergies   Allergen Reactions    Other Food Anaphylaxis     SOFT SHELL CRABS    Pcn [Penicillins] Other (comments)     Pt stated \"always been told PCN\"           Objective:     Vitals:    01/17/18 1316   BP: 120/77   Pulse: 80   Resp: 16   Temp: 98.3 °F (36.8 °C)   TempSrc: Oral   SpO2: 97%   Weight: 186 lb 9.6 oz (84.6 kg)   Height: 5' 8\" (1.727 m)            Physical Exam:    GENERAL: alert, cooperative, no distress, appears stated age  EYE: negative  LYMPHATIC: Cervical, supraclavicular, and axillary nodes normal.   THROAT & NECK: normal and no erythema or exudates noted. LUNG: clear to auscultation bilaterally  HEART: regular rate and rhythm  ABDOMEN: soft, non-tender  EXTREMITIES:  no edema  SKIN: Normal.  NEUROLOGIC: negative      Lab Results   Component Value Date/Time    WBC 4.8 01/05/2018 02:07 PM    Hemoglobin (POC) 10.3 10/06/2017 01:14 PM    HGB 12.2 01/05/2018 02:07 PM    HCT 37.1 01/05/2018 02:07 PM    PLATELET 515 39/47/4738 02:07 PM    MCV 96.4 01/05/2018 02:07 PM           Assessment:     1.  Extrahepatic cholangiocarcinoma:    T3 N1 (1 of 12 LN +ve)  Invasion into pancreas  R0 resection    > S/P Pancreatoduodenectomy on  10/06/2017    On Capecitabine 1250 mg/m2 PO BID - started 12/4/2017    Receiving adjuvant monotherapy    Capecitabine - Cycle 3 Day 3    Tolerating treatment   A detailed system by system evaluation of side effect was performed to assess chemotherapy related toxicity. Blood counts are acceptable. Results reviewed with the patient. Symptom management form reviewed with patient. 2. Nausea/Vomiting    > Patient reports Reglan and Zofran are not working  > Start compazine as needed       Plan:       > Continue Capecitabine  > Compazine as needed  > Monthly labs and port flush in [facility]  > Follow-up in 6 weeks        Signed by: Black Galvni MD                     January 17, 2018          CC. Malu Mendiola MD  CC. Sandrine Viveros MD  CC. Dale Li MD  CC.  Trisha Conner MD

## 2018-01-17 NOTE — PROGRESS NOTES
Charles Hanna is a 65 y.o. male here today for Extrahepatic Cholangiocarcinoma f/u. On Xeloda. Whipple; 10/6/17. Patient accompanied by son to today's appointment. VS stable. Patient denies pain. Patient states he has N/V; antiemetics not effective; pt states he vomits at anytime of the day or night; vomit is yellow in color. Patient states he has had some numbness and tingling in his feet; denies numbness and tingling at this time. Pt has some light red splotchy areas on the palms of his hands. Blood pressure 120/77, pulse 80, temperature 98.3 °F (36.8 °C), temperature source Oral, resp. rate 16, height 5' 8\" (1.727 m), weight 186 lb 9.6 oz (84.6 kg), SpO2 97 %.

## 2018-01-23 ENCOUNTER — OFFICE VISIT (OUTPATIENT)
Dept: SURGERY | Age: 63
End: 2018-01-23

## 2018-01-23 VITALS
OXYGEN SATURATION: 97 % | HEART RATE: 78 BPM | SYSTOLIC BLOOD PRESSURE: 110 MMHG | DIASTOLIC BLOOD PRESSURE: 70 MMHG | BODY MASS INDEX: 28.04 KG/M2 | HEIGHT: 68 IN | TEMPERATURE: 97.9 F | RESPIRATION RATE: 16 BRPM | WEIGHT: 185 LBS

## 2018-01-23 DIAGNOSIS — C22.1 CHOLANGIOCARCINOMA OF BILIARY TRACT (HCC): Primary | ICD-10-CM

## 2018-01-23 NOTE — MR AVS SNAPSHOT
[address] [facility] [address] [address] 
[phone] Patient: Cristina Gonzalez MRN: [id number] Visit Information Date & Time Provider Department Dept. Phone Encounter #  
 1/23/2018  2:00 PM Kenneth Velásquez, [address] [facility] [phone] [id number] Follow-up Instructions Return in about 3 months (around 4/23/2018). Routing History Your Appointments 2/28/2018  8:30 AM  
ESTABLISHED PATIENT with Tia Sheppard MD  
[facility] Appt Note: 6 wk f/u  
 [address] [facility] [address] [id number]  
[address] [place] [address] 600 [facility] [address] Jose Balderas Upcoming Health Maintenance Date Due COLONOSCOPY 1/24/2011 Pneumococcal 19-64 Highest Risk (3 of 3 - PPSV23) 1/4/2022 DTaP/Tdap/Td series (2 - Td) 1/7/2023 Allergies as of 1/23/2018  Review Complete On: 1/23/2018 By: Dale Kincaid LPN Severity Noted Reaction Type Reactions Other Food High 09/29/2017    Anaphylaxis SOFT SHELL CRABS Pcn [Penicillins]  07/23/2017    Other (comments) Pt stated \"always been told PCN\" Current Immunizations  Reviewed on 1/5/2018 Name Date Pneumococcal Conjugate (PCV-13) 10/26/2017 Pneumococcal Polysaccharide (PPSV-23) 1/4/2017 Not reviewed this visit You Were Diagnosed With   
  
 Codes Comments [facility]    -  Primary ICD-10-CM: C24.9 ICD-9-CM: 156.9 Vitals BP Pulse Temp Resp Height(growth percentile) Weight(growth percentile) 110/70 (BP 1 Location: Right arm, BP Patient Position: Sitting) 78 97.9 °F (36.6 °C) (Oral) 16 5' 8\" (1.727 m) 185 lb (83.9 kg) SpO2 BMI Smoking Status 97% 28.13 kg/m2 Never Smoker BMI and BSA Data Body Mass Index Body Surface Area 28.13 kg/m 2 2.01 m 2 Preferred Pharmacy Pharmacy Name Phone [facility], [facility] [address] [place] [address] [phone] Your Updated Medication List  
  
   
This list is accurate as of: 1/23/18 11:59 PM.  Always use your most recent med list.  
  
  
  
  
 amiodarone 200 mg tablet Commonly known as:  CORDARONE Take 1 Tab by mouth two (2) times a day. Indications: PREVENTION OF RECURRENT ATRIAL FIBRILLATION  
  
 aspirin 81 mg chewable tablet Take 81 mg by mouth nightly. CALCIUM 600 + D 600-125 mg-unit Tab Generic drug:  calcium-cholecalciferol (d3) Take  by mouth daily. cyanocobalamin 1,000 mcg/mL injection Commonly known as:  VITAMIN B12  
1,000 mcg by IntraMUSCular route Twice a month. docusate sodium 100 mg capsule Commonly known as:  Amena Peeling Take 1 Cap by mouth two (2) times daily as needed for Constipation. fluorouracil 5 % chemo cream  
Commonly known as:  EFUDEX Apply  to affected area two (2) times a day.  
  
 gabapentin 300 mg capsule Commonly known as:  NEURONTIN Take 3 Caps by mouth two (2) times a day. 3 x 300mg caps (900mg) twice daily HYDROcodone-acetaminophen 5-325 mg per tablet Commonly known as:  NORCO  
TAKE 1-2 TABLETS BY MOUTH EVERY 4 HOURS AS NEEDED  
  
 magnesium 200 mg Tab Take  by mouth daily. oxyCODONE-acetaminophen 5-325 mg per tablet Commonly known as:  PERCOCET Take 1 Tab by mouth every four (4) hours as needed for Pain. Max Daily Amount: 6 Tabs. prochlorperazine 10 mg tablet Commonly known as:  COMPAZINE Take 0.5 Tabs by mouth every six (6) hours as needed for up to 7 days. ramipril 10 mg capsule Commonly known as:  ALTACE Take 1 Cap by mouth nightly. VITAMIN D3 1,000 unit Cap Generic drug:  cholecalciferol Take 5,000 Units by mouth daily. ZyrTEC 10 mg tablet Generic drug:  cetirizine Take  by mouth nightly. Indications: AUTOIMMUNE DISORDER, SJOGRENS. Follow-up Instructions Return in about 3 months (around 4/23/2018). To-Do List   
 Around 01/25/2018 Imaging:  XR UPPER GI SERIES W KUB   
  
 02/02/2018 2:00 PM  
  Appointment with [address] ([phone])  
  
 03/02/2018 2:00 PM  
  Appointment with [address] [facility] ([phone])  
  
 03/30/2018 2:00 PM  
  Appointment with [address] [facility] ([phone]) [facility]! Dear Wang Gomes: Thank you for requesting a [email or web address] account. Our records indicate that you already have an active [email or web address] account. You can access your account anytime at [email or web address] Did you know that you can access your hospital and ER discharge instructions at any time in [email or web address]? You can also review all of your test results from your hospital stay or ER visit. Additional Information If you have questions, please visit the Frequently Asked Questions section of the [email or web address] website at [email or web address] Remember, [email or web address] is NOT to be used for urgent needs. For medical emergencies, dial 911. Now available from your iPhone and Android! Please provide this summary of care documentation to your next provider. Your primary care clinician is listed as Dairl Shaggy If you have any questions after today's visit, please call [phone].

## 2018-01-23 NOTE — PROGRESS NOTES
1. Have you been to the ER, urgent care clinic since your last visit? Hospitalized since your last visit? No    2. Have you seen or consulted any other health care providers outside of the [address] [place] [address] since your last visit? Include any pap smears or colon screening. No       Patient states he has nausea and  vomiting once a day 5 out of 7 days a week.

## 2018-01-24 NOTE — PROGRESS NOTES
[address] [facility]      Clinic Note - Follow up    Subjective     Yadira Frias returns for scheduled follow up today. He had cholangiocarcinoma of the distal bile duct and is s/p a pylorus preserving whipple on 10/6/17 for a distal cholangiocarcinoma, pT3pN1 with 1/12 lymph nodes positive. He is currently on adjuvant chemotherapy under the care of Dr. Danne Bence. He states he is tolerating chemotherapy well but does have some neuropathy in his hands and feet. He continues to vomit once per day about 4-5 days per week of yellow, bilious liquid. He denies any food contents. He is eating well otherwise and maintaining his weight. He states the n/v is not related to his food intake. He is having normal bowel function. He denies any abdominal pain. Objective     Visit Vitals    /70 (BP 1 Location: Right arm, BP Patient Position: Sitting)    Pulse 78    Temp 97.9 °F (36.6 °C) (Oral)    Resp 16    Ht 5' 8\" (1.727 m)    Wt 185 lb (83.9 kg)    SpO2 97%    BMI 28.13 kg/m2         PE  GEN - Awake, alert, communicating appropriately. NAD  Pulm - CTAB  CV - RRR  Abd - soft, NT, ND. No palpable masses or organomegaly. Incision well healed. No evidence of hernia. Ext - warm, well perfused. Labs  Results for Chantelle Connelly (MRN [id number]) as of 1/24/2018 16:14   Ref. Range 1/5/2018 14:07   WBC Latest Ref Range: 4.1 - 11.1 K/uL 4.8   RBC Latest Ref Range: 4.10 - 5.70 M/uL 3.85 (L)   HGB Latest Ref Range: 12.1 - 17.0 g/dL 12.2   HCT Latest Ref Range: 36.6 - 50.3 % 37.1   MCV Latest Ref Range: 80.0 - 99.0 FL 96.4   MCH Latest Ref Range: 26.0 - 34.0 PG 31.7   MCHC Latest Ref Range: 30.0 - 36.5 g/dL 32.9   RDW Latest Ref Range: 11.5 - 14.5 % 17.8 (H)   PLATELET Latest Ref Range: 150 - 400 K/uL 172   Results for Chantelle Connelly (MRN [id number]) as of 1/24/2018 16:14   Ref.  Range 1/5/2018 14:07   Sodium Latest Ref Range: 136 - 145 mmol/L 142   Potassium Latest Ref Range: 3.5 - 5.1 mmol/L 3.6   Chloride Latest Ref Range: 97 - 108 mmol/L 107   CO2 Latest Ref Range: 21 - 32 mmol/L 28   Anion gap Latest Ref Range: 5 - 15 mmol/L 7   Glucose Latest Ref Range: 65 - 100 mg/dL 136 (H)   BUN Latest Ref Range: 6 - 20 MG/DL 21 (H)   Creatinine Latest Ref Range: 0.70 - 1.30 MG/DL 1.07   BUN/Creatinine ratio Latest Ref Range: 12 - 20   20   Calcium Latest Ref Range: 8.5 - 10.1 MG/DL 8.6   GFR est non-AA Latest Ref Range: >60 ml/min/1.73m2 >60   GFR est AA Latest Ref Range: >60 ml/min/1.73m2 >60   Bilirubin, total Latest Ref Range: 0.2 - 1.0 MG/DL 1.0   Protein, total Latest Ref Range: 6.4 - 8.2 g/dL 6.3 (L)   Albumin Latest Ref Range: 3.5 - 5.0 g/dL 3.7   Globulin Latest Ref Range: 2.0 - 4.0 g/dL 2.6   A-G Ratio Latest Ref Range: 1.1 - 2.2   1.4   ALT (SGPT) Latest Ref Range: 12 - 78 U/L 48   AST Latest Ref Range: 15 - 37 U/L 16   Alk. phosphatase Latest Ref Range: 45 - 117 U/L 128 (H)       Assessment     Nadeem Ji is a 65 y. o.yr old male with hx of cholangiocarcinoma of the distal bile duct and is s/p a pylorus preserving whipple on 10/6/17, pT3pN1 with 1/12 lymph nodes positive. He is currently on adjuvant chemotherapy under the care of Dr. Marina Mustafa. He continues to have some vomiting. It is unclear to me why he is vomiting. This could be persistent delayed gastric emptying or possibly a chronic afferent loop syndrome. Plan     I discussed with the patient that if this is afferent loop syndrome, it would likely require a surgery to correct which would be conversion of his duodenojejunostomy to a eren-en Y to facilitate emptying of the bilio-pancreatic loop. He is not interested in surgery now and is also still on adjuvant chemotherapy. I would like to get an UGI to make sure this isn't a functional problem with his stomach emptying. I will also start him on a PPI and see if this helps. I have also offered to try a bile acid sequestrant in a few weeks if he has no improvement on the PPI.   I will plan to see him back in a few months once he has completed adjuvant therapy and has had surveillance imaging. He will call with any further issues in the interim. 20 mins of time was spent with the patient of which > 50% of the time involved face-to-face counseling of the patient regarding the proposed treatment plan.         Greg Pennington MD  1/23/2018    CC: DO Dr. Hernán Middleton Post

## 2018-02-01 ENCOUNTER — HOSPITAL ENCOUNTER (OUTPATIENT)
Dept: GENERAL RADIOLOGY | Age: 63
Discharge: HOME | End: 2018-02-01
Attending: SURGERY
Payer: COMMERCIAL

## 2018-02-01 DIAGNOSIS — C22.1 CHOLANGIOCARCINOMA OF BILIARY TRACT (HCC): ICD-10-CM

## 2018-02-01 PROCEDURE — 74241 XR UPPER GI SERIES W KUB: CPT

## 2018-02-01 NOTE — PROGRESS NOTES
I have reviewed the images and discussed with Dr. Alcantara. He will reach out to the patient regarding consideration of an EGD and possible dilation of the duodeno-jejunostomy if a stricture is noted.

## 2018-02-02 ENCOUNTER — HOSPITAL ENCOUNTER (OUTPATIENT)
Dept: INFUSION THERAPY | Age: 63
Discharge: HOME | End: 2018-02-02
Payer: COMMERCIAL

## 2018-02-02 VITALS
HEART RATE: 76 BPM | OXYGEN SATURATION: 98 % | TEMPERATURE: 99.3 F | RESPIRATION RATE: 18 BRPM | DIASTOLIC BLOOD PRESSURE: 79 MMHG | SYSTOLIC BLOOD PRESSURE: 131 MMHG

## 2018-02-02 LAB
ALBUMIN SERPL-MCNC: 3.7 G/DL (ref 3.5–5)
ALBUMIN/GLOB SERPL: 1.4 {RATIO} (ref 1.1–2.2)
ALP SERPL-CCNC: 136 U/L (ref 45–117)
ALT SERPL-CCNC: 50 U/L (ref 12–78)
ANION GAP SERPL CALC-SCNC: 4 MMOL/L (ref 5–15)
AST SERPL-CCNC: 24 U/L (ref 15–37)
BASOPHILS # BLD: 0.1 K/UL (ref 0–0.1)
BASOPHILS NFR BLD: 1 % (ref 0–1)
BILIRUB SERPL-MCNC: 1.3 MG/DL (ref 0.2–1)
BUN SERPL-MCNC: 19 MG/DL (ref 6–20)
BUN/CREAT SERPL: 18 (ref 12–20)
CALCIUM SERPL-MCNC: 8.4 MG/DL (ref 8.5–10.1)
CHLORIDE SERPL-SCNC: 104 MMOL/L (ref 97–108)
CO2 SERPL-SCNC: 30 MMOL/L (ref 21–32)
CREAT SERPL-MCNC: 1.08 MG/DL (ref 0.7–1.3)
DIFFERENTIAL METHOD BLD: ABNORMAL
EOSINOPHIL # BLD: 0.3 K/UL (ref 0–0.4)
EOSINOPHIL NFR BLD: 4 % (ref 0–7)
ERYTHROCYTE [DISTWIDTH] IN BLOOD BY AUTOMATED COUNT: 19.7 % (ref 11.5–14.5)
GLOBULIN SER CALC-MCNC: 2.6 G/DL (ref 2–4)
GLUCOSE SERPL-MCNC: 154 MG/DL (ref 65–100)
HCT VFR BLD AUTO: 37.4 % (ref 36.6–50.3)
HGB BLD-MCNC: 12.9 G/DL (ref 12.1–17)
IMM GRANULOCYTES # BLD: 0.2 K/UL (ref 0–0.04)
IMM GRANULOCYTES NFR BLD AUTO: 3 % (ref 0–0.5)
LYMPHOCYTES # BLD: 1.4 K/UL (ref 0.8–3.5)
LYMPHOCYTES NFR BLD: 20 % (ref 12–49)
MCH RBC QN AUTO: 33.4 PG (ref 26–34)
MCHC RBC AUTO-ENTMCNC: 34.5 G/DL (ref 30–36.5)
MCV RBC AUTO: 96.9 FL (ref 80–99)
MONOCYTES # BLD: 0.6 K/UL (ref 0–1)
MONOCYTES NFR BLD: 9 % (ref 5–13)
NEUTS SEG # BLD: 4.3 K/UL (ref 1.8–8)
NEUTS SEG NFR BLD: 63 % (ref 32–75)
NRBC # BLD: 0 K/UL (ref 0–0.01)
NRBC BLD-RTO: 0 PER 100 WBC
PLATELET # BLD AUTO: 146 K/UL (ref 150–400)
PMV BLD AUTO: 10.7 FL (ref 8.9–12.9)
POTASSIUM SERPL-SCNC: 3.6 MMOL/L (ref 3.5–5.1)
PROT SERPL-MCNC: 6.3 G/DL (ref 6.4–8.2)
RBC # BLD AUTO: 3.86 M/UL (ref 4.1–5.7)
RBC MORPH BLD: ABNORMAL
SODIUM SERPL-SCNC: 138 MMOL/L (ref 136–145)
WBC # BLD AUTO: 6.9 K/UL (ref 4.1–11.1)
WBC MORPH BLD: ABNORMAL

## 2018-02-02 PROCEDURE — 36591 DRAW BLOOD OFF VENOUS DEVICE: CPT

## 2018-02-02 PROCEDURE — 36415 COLL VENOUS BLD VENIPUNCTURE: CPT | Performed by: NURSE PRACTITIONER

## 2018-02-02 PROCEDURE — 85025 COMPLETE CBC W/AUTO DIFF WBC: CPT | Performed by: NURSE PRACTITIONER

## 2018-02-02 PROCEDURE — 74011250636 HC RX REV CODE- 250/636: Performed by: NURSE PRACTITIONER

## 2018-02-02 PROCEDURE — 80053 COMPREHEN METABOLIC PANEL: CPT | Performed by: NURSE PRACTITIONER

## 2018-02-02 RX ORDER — HEPARIN 100 UNIT/ML
500 SYRINGE INTRAVENOUS AS NEEDED
Status: SHIPPED | OUTPATIENT
Start: 2018-02-02 | End: 2018-02-03

## 2018-02-02 RX ORDER — SODIUM CHLORIDE 0.9 % (FLUSH) 0.9 %
10-40 SYRINGE (ML) INJECTION AS NEEDED
Status: SHIPPED | OUTPATIENT
Start: 2018-02-02 | End: 2018-02-03

## 2018-02-02 RX ADMIN — Medication 500 UNITS: at 13:55

## 2018-02-02 RX ADMIN — Medication 20 ML: at 13:55

## 2018-02-02 NOTE — PROGRESS NOTES
1350 Pt arrived at [facility] ambulatory and in no distress for General Cosme. Assessment unremarkable except pt has n/v for a few week. Per pt he recently had a barium swallow test and will have stretching of his esophagus within the next few weeks. R Chest Port accessed and flushed per protocol w/o difficulty. Positive for blood return and Hub needle removed. Labs: CBC w/ diff and CMP drawn results are pending at the time of this note please follow up in [facility]. Thanks    Visit Vitals    /79 (BP 1 Location: Left arm, BP Patient Position: At rest)    Pulse 76    Temp 99.3 °F (37.4 °C)    Resp 18    SpO2 98%       Medications received:  NS Flush  Heparin Flush    1405 Tolerated treatment well, no adverse reaction noted. D/Cd from [facility] ambulatory and in no distress accompanied by self.   Next appt 3/2/18 @ 2 pm

## 2018-02-07 DIAGNOSIS — K30 DELAYED GASTRIC EMPTYING: ICD-10-CM

## 2018-02-07 RX ORDER — METOCLOPRAMIDE 5 MG/1
TABLET ORAL
Qty: 180 TAB | Refills: 1 | Status: SHIPPED | OUTPATIENT
Start: 2018-02-07 | End: 2018-06-07

## 2018-02-09 ENCOUNTER — TELEPHONE (OUTPATIENT)
Dept: INTERNAL MEDICINE CLINIC | Age: 63
End: 2018-02-09

## 2018-02-09 NOTE — TELEPHONE ENCOUNTER
Spoke with patients pharmacist  after 2 patient identifiers being note and advised per Dr. Sun Rodriguez that 6 bottles should dispenced and 1 refill.

## 2018-02-09 NOTE — TELEPHONE ENCOUNTER
Kei[facility] needs a call back to get clarification on prescription left on voice mail for patient. Please call.  Thank you

## 2018-02-12 ENCOUNTER — DOCUMENTATION ONLY (OUTPATIENT)
Dept: SURGERY | Age: 63
End: 2018-02-12

## 2018-02-12 NOTE — PROGRESS NOTES
Spoke to Dr. Alberta Rubin office and was told an UED is set up for the patient on 2/15/18. I also called Mr. Cristina to make sure he was aware and he stated someone from Dr. Jay Longoria office called him today and gave him information. Pt expressed understanding.

## 2018-02-13 ENCOUNTER — PATIENT OUTREACH (OUTPATIENT)
Dept: OTHER | Age: 63
End: 2018-02-13

## 2018-02-13 NOTE — PROGRESS NOTES
[facility] progress note    Called Mr. Cristina  at agreed time. Verified  and address for HIPAA security. Changes since last call include:     Med changes : Added Compazine for nausea/Vomiting   Doctors appointments  :  ERCP planned for Thurs 2/15   * Mr. Aly Ramirez notes that his nausea is episodic and not constant. He has a few good days than a bad day where he vomits 2-3 times. - He reports no weight loss - emesis is mostly yellow/green and frothy - bile. - He does use the Reglan and Comapazine/ but state that nausea worsens if he uses sublinqual Zofran. - He hopes that ERCP planned on Thursday will relieve pressure and perhaps find what is blocking proper drainage. Discussed possible stent placement. - He is concerned that this indicates advancement of disease. Encouraged him to stay positive - but consider worry as effective way to plan. Reminded him of the positive report from oncology last month. - the N/V limits his ability to plan outings/ but he is determined to keep his life as normal as possible. - His son will take him to the procedure and his wife will join after work that afternoon. Check in for the patients goals:    1) Goal    :  Medical plan in place/  PCP communication in CC noted. A) Progress -  Nausea is uncomfortable but not impacting nutrition or causing weight loss. Using meds as prescribed. B) Barriers addressed - Concerned of advancement of disease. C) Utilizing strategy  - ECRP planned/  Reminded him of positive report from oncology last month. D) Plan for next check in   -  FU after ERCP. Patient verbalized understanding of all information discussed. Pt has my contact information for any further questions, concerns, or needs. Plan for next call:  or  - FU ERCP          Chart Review:    Oncology FU visit   Assessment:      1.  Extrahepatic cholangiocarcinoma:     T3 N1 (1 of 12 LN +ve)  Invasion into pancreas  R0 resection     > S/P Pancreatoduodenectomy on  10/06/2017     On Capecitabine 1250 mg/m2 PO BID - started 12/4/2017     Receiving adjuvant monotherapy                         Capecitabine - Cycle 3 Day 3     Tolerating treatment   A detailed system by system evaluation of side effect was performed to assess chemotherapy related toxicity. Blood counts are acceptable. Results reviewed with the patient. Symptom management form reviewed with patient.        2. Nausea/Vomiting     > Patient reports Reglan and Zofran are not working  > Start compazine as needed      Progress notes   Vandana Durbin LPN at 83/71/61 6822   Status: Signed          Spoke to Dr. France Foy office and was told an UED is set up for the patient on 2/15/18. I also called Mr. Cristina to make sure he was aware and he stated someone from Dr. Nya Murry office called him today and gave him information. Pt expressed understanding.

## 2018-02-15 ENCOUNTER — ANESTHESIA EVENT (OUTPATIENT)
Dept: ENDOSCOPY | Age: 63
End: 2018-02-15
Payer: COMMERCIAL

## 2018-02-15 ENCOUNTER — ANESTHESIA (OUTPATIENT)
Dept: ENDOSCOPY | Age: 63
End: 2018-02-15
Payer: COMMERCIAL

## 2018-02-15 ENCOUNTER — HOSPITAL ENCOUNTER (OUTPATIENT)
Age: 63
Setting detail: OUTPATIENT SURGERY
Discharge: HOME | End: 2018-02-15
Attending: SPECIALIST | Admitting: SPECIALIST
Payer: COMMERCIAL

## 2018-02-15 VITALS
DIASTOLIC BLOOD PRESSURE: 84 MMHG | WEIGHT: 185 LBS | BODY MASS INDEX: 28.04 KG/M2 | HEART RATE: 72 BPM | OXYGEN SATURATION: 96 % | SYSTOLIC BLOOD PRESSURE: 128 MMHG | RESPIRATION RATE: 17 BRPM | HEIGHT: 68 IN | TEMPERATURE: 97.7 F

## 2018-02-15 PROCEDURE — 74011250636 HC RX REV CODE- 250/636

## 2018-02-15 PROCEDURE — 74011000250 HC RX REV CODE- 250

## 2018-02-15 PROCEDURE — 88305 TISSUE EXAM BY PATHOLOGIST: CPT | Performed by: SPECIALIST

## 2018-02-15 PROCEDURE — 74011250637 HC RX REV CODE- 250/637: Performed by: SPECIALIST

## 2018-02-15 PROCEDURE — 76040000019: Performed by: SPECIALIST

## 2018-02-15 PROCEDURE — 76060000031 HC ANESTHESIA FIRST 0.5 HR: Performed by: SPECIALIST

## 2018-02-15 PROCEDURE — C1726 CATH, BAL DIL, NON-VASCULAR: HCPCS | Performed by: SPECIALIST

## 2018-02-15 PROCEDURE — 77030018712 HC DEV BLLN INFL BSC -B: Performed by: SPECIALIST

## 2018-02-15 PROCEDURE — 77030009426 HC FCPS BIOP ENDOSC BSC -B: Performed by: SPECIALIST

## 2018-02-15 RX ORDER — NALOXONE HYDROCHLORIDE 0.4 MG/ML
0.4 INJECTION, SOLUTION INTRAMUSCULAR; INTRAVENOUS; SUBCUTANEOUS
Status: DISCONTINUED | OUTPATIENT
Start: 2018-02-15 | End: 2018-02-15 | Stop reason: HOSPADM

## 2018-02-15 RX ORDER — SODIUM CHLORIDE 0.9 % (FLUSH) 0.9 %
5-10 SYRINGE (ML) INJECTION AS NEEDED
Status: DISCONTINUED | OUTPATIENT
Start: 2018-02-15 | End: 2018-02-15 | Stop reason: HOSPADM

## 2018-02-15 RX ORDER — SODIUM CHLORIDE 0.9 % (FLUSH) 0.9 %
5-10 SYRINGE (ML) INJECTION EVERY 8 HOURS
Status: DISCONTINUED | OUTPATIENT
Start: 2018-02-15 | End: 2018-02-15 | Stop reason: HOSPADM

## 2018-02-15 RX ORDER — MIDAZOLAM HYDROCHLORIDE 1 MG/ML
.25-1 INJECTION, SOLUTION INTRAMUSCULAR; INTRAVENOUS
Status: DISCONTINUED | OUTPATIENT
Start: 2018-02-15 | End: 2018-02-15 | Stop reason: HOSPADM

## 2018-02-15 RX ORDER — SODIUM CHLORIDE 9 MG/ML
50 INJECTION, SOLUTION INTRAVENOUS CONTINUOUS
Status: DISCONTINUED | OUTPATIENT
Start: 2018-02-15 | End: 2018-02-15 | Stop reason: HOSPADM

## 2018-02-15 RX ORDER — LIDOCAINE HYDROCHLORIDE 20 MG/ML
INJECTION, SOLUTION INFILTRATION; PERINEURAL AS NEEDED
Status: DISCONTINUED | OUTPATIENT
Start: 2018-02-15 | End: 2018-02-15 | Stop reason: HOSPADM

## 2018-02-15 RX ORDER — ATROPINE SULFATE 0.1 MG/ML
0.5 INJECTION INTRAVENOUS
Status: DISCONTINUED | OUTPATIENT
Start: 2018-02-15 | End: 2018-02-15 | Stop reason: HOSPADM

## 2018-02-15 RX ORDER — PROPOFOL 10 MG/ML
INJECTION, EMULSION INTRAVENOUS AS NEEDED
Status: DISCONTINUED | OUTPATIENT
Start: 2018-02-15 | End: 2018-02-15 | Stop reason: HOSPADM

## 2018-02-15 RX ORDER — DEXTROMETHORPHAN/PSEUDOEPHED 2.5-7.5/.8
1.2 DROPS ORAL
Status: DISCONTINUED | OUTPATIENT
Start: 2018-02-15 | End: 2018-02-15 | Stop reason: HOSPADM

## 2018-02-15 RX ORDER — EPINEPHRINE 0.1 MG/ML
1 INJECTION INTRACARDIAC; INTRAVENOUS
Status: DISCONTINUED | OUTPATIENT
Start: 2018-02-15 | End: 2018-02-15 | Stop reason: HOSPADM

## 2018-02-15 RX ORDER — FENTANYL CITRATE 50 UG/ML
200 INJECTION, SOLUTION INTRAMUSCULAR; INTRAVENOUS
Status: DISCONTINUED | OUTPATIENT
Start: 2018-02-15 | End: 2018-02-15 | Stop reason: HOSPADM

## 2018-02-15 RX ORDER — SODIUM CHLORIDE 9 MG/ML
INJECTION, SOLUTION INTRAVENOUS
Status: DISCONTINUED | OUTPATIENT
Start: 2018-02-15 | End: 2018-02-15 | Stop reason: HOSPADM

## 2018-02-15 RX ORDER — FLUMAZENIL 0.1 MG/ML
0.2 INJECTION INTRAVENOUS
Status: DISCONTINUED | OUTPATIENT
Start: 2018-02-15 | End: 2018-02-15 | Stop reason: HOSPADM

## 2018-02-15 RX ORDER — GLYCOPYRROLATE 0.2 MG/ML
INJECTION INTRAMUSCULAR; INTRAVENOUS AS NEEDED
Status: DISCONTINUED | OUTPATIENT
Start: 2018-02-15 | End: 2018-02-15 | Stop reason: HOSPADM

## 2018-02-15 RX ADMIN — PROPOFOL 20 MG: 10 INJECTION, EMULSION INTRAVENOUS at 16:47

## 2018-02-15 RX ADMIN — SODIUM CHLORIDE: 9 INJECTION, SOLUTION INTRAVENOUS at 16:26

## 2018-02-15 RX ADMIN — PROPOFOL 20 MG: 10 INJECTION, EMULSION INTRAVENOUS at 16:48

## 2018-02-15 RX ADMIN — PROPOFOL 30 MG: 10 INJECTION, EMULSION INTRAVENOUS at 16:46

## 2018-02-15 RX ADMIN — PROPOFOL 20 MG: 10 INJECTION, EMULSION INTRAVENOUS at 16:51

## 2018-02-15 RX ADMIN — PROPOFOL 30 MG: 10 INJECTION, EMULSION INTRAVENOUS at 16:43

## 2018-02-15 RX ADMIN — PROPOFOL 20 MG: 10 INJECTION, EMULSION INTRAVENOUS at 16:52

## 2018-02-15 RX ADMIN — PROPOFOL 30 MG: 10 INJECTION, EMULSION INTRAVENOUS at 16:45

## 2018-02-15 RX ADMIN — LIDOCAINE HYDROCHLORIDE 100 MG: 20 INJECTION, SOLUTION INFILTRATION; PERINEURAL at 16:39

## 2018-02-15 RX ADMIN — PROPOFOL 30 MG: 10 INJECTION, EMULSION INTRAVENOUS at 16:41

## 2018-02-15 RX ADMIN — GLYCOPYRROLATE 0.2 MG: 0.2 INJECTION INTRAMUSCULAR; INTRAVENOUS at 16:29

## 2018-02-15 RX ADMIN — PROPOFOL 20 MG: 10 INJECTION, EMULSION INTRAVENOUS at 16:50

## 2018-02-15 RX ADMIN — PROPOFOL 20 MG: 10 INJECTION, EMULSION INTRAVENOUS at 16:49

## 2018-02-15 RX ADMIN — PROPOFOL 30 MG: 10 INJECTION, EMULSION INTRAVENOUS at 16:40

## 2018-02-15 RX ADMIN — PROPOFOL 30 MG: 10 INJECTION, EMULSION INTRAVENOUS at 16:44

## 2018-02-15 RX ADMIN — PROPOFOL 20 MG: 10 INJECTION, EMULSION INTRAVENOUS at 16:55

## 2018-02-15 RX ADMIN — PROPOFOL 20 MG: 10 INJECTION, EMULSION INTRAVENOUS at 16:53

## 2018-02-15 RX ADMIN — PROPOFOL 70 MG: 10 INJECTION, EMULSION INTRAVENOUS at 16:39

## 2018-02-15 NOTE — ANESTHESIA PREPROCEDURE EVALUATION
Anesthetic History   No history of anesthetic complications            Review of Systems / Medical History  Patient summary reviewed, nursing notes reviewed and pertinent labs reviewed    Pulmonary  Within defined limits                 Neuro/Psych   Within defined limits           Cardiovascular    Hypertension        Dysrhythmias : atrial fibrillation        Comments: S/p afib ablation   GI/Hepatic/Renal  Within defined limits              Endo/Other  Within defined limits           Other Findings            Physical Exam    Airway  Mallampati: II  TM Distance: > 6 cm  Neck ROM: normal range of motion   Mouth opening: Normal     Cardiovascular  Regular rate and rhythm,  S1 and S2 normal,  no murmur, click, rub, or gallop             Dental  No notable dental hx       Pulmonary  Breath sounds clear to auscultation               Abdominal  GI exam deferred       Other Findings            Anesthetic Plan    ASA: 2  Anesthesia type: MAC          Induction: Intravenous  Anesthetic plan and risks discussed with: Patient

## 2018-02-15 NOTE — H&P
Pre-endoscopy H and P     The patient was seen and examined in the endoscopy suite. The airway was assessed and docuemented. The problem list and medications were reviewed. Patient Active Problem List   Diagnosis Code    Obstructive jaundice K83.8    Common bile duct (CBD) obstruction K83.1    UTI (urinary tract infection) N39.0    Cholangiocarcinoma of biliary tract (HCC) C24.9    Cholangiocarcinoma determined by biopsy of biliary tract (HCC) C24.9    Paroxysmal atrial fibrillation (HCC) I48.0    S/P ablation of atrial fibrillation Z98.890, Z86.79    Essential hypertension I10    Sjogren's disease ([facility] M35.00    Cramps, muscle, general R25.2    Low vitamin D level E55.9    Low vitamin B12 level E53.8     Social History     Social History    Marital status:      Spouse name: N/A    Number of children: N/A    Years of education: N/A     Occupational History    Not on file. Social History Main Topics    Smoking status: Never Smoker    Smokeless tobacco: Never Used    Alcohol use Yes      Comment: RARELY    Drug use: No    Sexual activity: Not on file     Other Topics Concern    Not on file     Social History Narrative     Past Medical History:   Diagnosis Date    Arrhythmia     Previous a.fib, ablation, NSR now; NO LONGER FOLLOWED BY CARDIOLOGIST.  Autoimmune disease ([facility]     SJOGREN'S    Cancer ([facility]     COMMON BILE DUCT, ADENOCARCINOMA    Hypertension     [facility] 2017         Prior to Admission Medications   Prescriptions Last Dose Informant Patient Reported? Taking? HYDROcodone-acetaminophen (NORCO) 5-325 mg per tablet Not Taking at Unknown time  Yes No   Sig: TAKE 1-2 TABLETS BY MOUTH EVERY 4 HOURS AS NEEDED   aspirin 81 mg chewable tablet 2/13/2018  Yes No   Sig: Take 81 mg by mouth nightly. calcium-cholecalciferol, d3, (CALCIUM 600 + D) 600-125 mg-unit tab 2/14/2018 at Unknown time  Yes Yes   Sig: Take  by mouth daily.    cetirizine (ZYRTEC) 10 mg tablet 2018 at Unknown time  Yes Yes   Sig: Take  by mouth nightly. Indications: AUTOIMMUNE DISORDER, SJOGRENS. cholecalciferol (VITAMIN D3) 1,000 unit cap 2018 at Unknown time  Yes Yes   Sig: Take 5,000 Units by mouth daily. cyanocobalamin (VITAMIN B12) 1,000 mcg/mL injection 2018  Yes No   Si,000 mcg by IntraMUSCular route Twice a month. fluorouracil (EFUDEX) 5 % chemo cream 2018 at Unknown time  No Yes   Sig: Apply  to affected area two (2) times a day.   gabapentin (NEURONTIN) 300 mg capsule 2018 at Unknown time  No Yes   Sig: Take 3 Caps by mouth two (2) times a day. 3 x 300mg caps (900mg) twice daily   metoclopramide HCl (REGLAN) 5 mg tablet 2/15/2018 at Unknown time  No Yes   Sig: TAKE 2 TABS BY MOUTH BEFORE BREAKFAST, LUNCH, AND DINNER FOR 60 DAYS. ramipril (ALTACE) 10 mg capsule 2018 at Unknown time  No Yes   Sig: Take 1 Cap by mouth nightly. Facility-Administered Medications: None       Chief complaint, history of present illness, and review of systems and Past medical History are positive for: dysphagia, cholangiocarcinoma and gastrojejunal stricture. The heart, lungs and mental status were satisfactory for the administration of sedation and for the procedure. I discussed with the patient the objectives, risks, consequences and alternatives to the procedure.      Plan: Endoscopic procedure with sedation     Rafi Marvin MD   2/15/2018  4:35 PM

## 2018-02-15 NOTE — PROCEDURES
[address]  [address]                 NAME:  Vita Burns   :   1955   MRN:   [id number]     Date/Time:  2/15/2018 5:04 PM    Esophagogastroduodenoscopy (EGD) Procedure Note    :  Drew Moralez MD    Referring Provider:  Nai Paula III,     Anethesia/Sedation:  MAC anesthesia Propofol    Preoperative diagnosis: CHOLANGIOCARCINOMA BILIARY TRACT, PLYORIS PRESERVING    Postoperative diagnosis: 1.- gastritis  2.- anastomotic stricture  3.- Gastric Nodules    Procedure Details     After infom consent was obtained for the procedure, with all risks and benefits of procedure explained the patient was taken to the endoscopy suite and placed in the left lateral decubitus position. Following sequential administration of sedation as per above, the [id number] gastroscope was inserted into the mouth and advanced under direct vision to second portion of the duodenum. A careful inspection was made as the gastroscope was withdrawn, including a retroflexed view of the proximal stomach; findings and interventions are described below. Findings:  Esophagus:normal  Stomach:Eccenteric pyloric opening with stricturing of the anastomosis dilated with 12-15 mm Balloon for 1 minute. Patchy antral erythema biopsied. 5 mm Nodule biopsied. Duodenum/jejunum:normal      Therapies:  Balloon dilation of anastomotic stricture    Specimens: gastric biopsy, gastric nodule biopsy           EBL: None    Complications:   None; patient tolerated the procedure well. Impression:    See Postoperative diagnosis above    Recommendations:  -Acid suppression with a proton pump inhibitor. , -Await pathology. , -No NSAIDS    Discharge disposition:  Home in the company of  when able to ambulate    Drew Moralez MD

## 2018-02-15 NOTE — DISCHARGE INSTRUCTIONS
Maria Victoria Crew  [id number]  1955    EGD DISCHARGE INSTRUCTIONS  Discomfort:  Sore throat- throat lozenges or warm salt water gargle  redness at IV site- apply warm compress to area; if redness or soreness persist- contact your physician  Gaseous discomfort- walking, belching will help relieve any discomfort  You may not operate a vehicle for 12 hours  You may not engage in an occupation involving machinery or appliances for rest of today. You may not drink alcoholic beverages for at least 12 hours  Avoid making any critical decisions for at least 24 hour  DIET  You may resume your regular diet - however -  remember your colon is empty and a heavy meal will produce gas. Avoid these foods:  vegetables, fried / greasy foods, carbonated drinks  MEDICATIONS   Regarding Aspirin or Nonsteroidal medications specifically, please see below. ACTIVITY  You may resume your normal daily activities. Spend the remainder of the day resting -  avoid any strenuous activity. CALL M.D. ANY SIGN OF   Increasing pain, nausea, vomiting  Abdominal distension (swelling)  New increased bleeding (oral or rectal)  Fever (chills)  Pain in chest area  Bloody discharge from nose or mouth  Shortness of breath    You may not  take any Advil, Aspirin, Ibuprofen, Motrin, Aleve, or Goodys for 10 days, ONLY  Tylenol as needed for pain.     Follow-up Instructions:   Call Dr. Kristine Pisano  Results of procedure / biopsy in 10 days  Telephone #  [phone]        DISCHARGE SUMMARY from Nurse    The following personal items collected during your admission are returned to you:   Dental Appliance: Dental Appliances: None  Vision: Visual Aid: Glasses  Hearing Aid:    Jewelry:    Clothing:    Other Valuables:    Valuables sent to safe:

## 2018-02-15 NOTE — IP AVS SNAPSHOT
[address] 
[phone] Patient: Brittney President MRN: [id number] About your hospitalization You were admitted on:  February 15, 2018 You last received care in the:  [facility] ENDOSCOPY You were discharged on:  February 15, 2018 Why you were hospitalized Your primary diagnosis was:  Not on File Follow-up Information None Your Scheduled Appointments Wednesday February 28, 2018  8:30 AM EST  
ESTABLISHED PATIENT with Glendy Bullard MD  
[address] Oncology at [facility]) [address] [place] [address] [address] [facility]  
[phone] Friday March 02, 2018  2:00 PM EST INFUSION 15 RI with CHAIR 2 [address] ([place] [address]) [address]  
[phone] Go to [facility], [address] [address] [address] [facility] Friday March 30, 2018  2:00 PM EDT INFUSION 15 RI with CHAIR 2 [address] ([place] [address]) [address]  
[phone] Go to [facility], [address] [address] [place] Discharge Orders None A check geremias indicates which time of day the medication should be taken. My Medications CONTINUE taking these medications Instructions Each Dose to Equal  
 Morning Noon Evening Bedtime  
 aspirin 81 mg chewable tablet Your last dose was: Your next dose is: Take 81 mg by mouth nightly. 81 mg CALCIUM 600 + D 600-125 mg-unit Tab Generic drug:  calcium-cholecalciferol (d3) Your last dose was: Your next dose is: Take  by mouth daily. cyanocobalamin 1,000 mcg/mL injection Commonly known as:  VITAMIN B12 Your last dose was: Your next dose is:    
   
   
 1,000 mcg by IntraMUSCular route Twice a month. 1000 mcg  
    
   
   
   
  
 fluorouracil 5 % chemo cream  
Commonly known as:  EFUDEX Your last dose was: Your next dose is:    
   
   
 Apply  to affected area two (2) times a day.  
     
   
   
   
  
 gabapentin 300 mg capsule Commonly known as:  NEURONTIN Your last dose was: Your next dose is: Take 3 Caps by mouth two (2) times a day. 3 x 300mg caps (900mg) twice daily 900 mg HYDROcodone-acetaminophen 5-325 mg per tablet Commonly known as:  Chadwick Lama Your last dose was: Your next dose is: TAKE 1-2 TABLETS BY MOUTH EVERY 4 HOURS AS NEEDED  
     
   
   
   
  
 metoclopramide HCl 5 mg tablet Commonly known as:  REGLAN Your last dose was: Your next dose is: TAKE 2 TABS BY MOUTH BEFORE BREAKFAST, LUNCH, AND DINNER FOR 60 DAYS. ramipril 10 mg capsule Commonly known as:  ALTACE Your last dose was: Your next dose is: Take 1 Cap by mouth nightly. 10 mg  
    
   
   
   
  
 VITAMIN D3 1,000 unit Cap Generic drug:  cholecalciferol Your last dose was: Your next dose is: Take 5,000 Units by mouth daily. 5000 Units ZyrTEC 10 mg tablet Generic drug:  cetirizine Your last dose was: Your next dose is: Take  by mouth nightly. Indications: AUTOIMMUNE DISORDER, SJOGRENS. Discharge Instructions Colette Garcia 
[id number] 
1955 EGD DISCHARGE INSTRUCTIONS Discomfort: 
Sore throat- throat lozenges or warm salt water gargle 
redness at IV site- apply warm compress to area; if redness or soreness persist- contact your physician Gaseous discomfort- walking, belching will help relieve any discomfort You may not operate a vehicle for 12 hours You may not engage in an occupation involving machinery or appliances for rest of today. You may not drink alcoholic beverages for at least 12 hours Avoid making any critical decisions for at least 24 hour DIET You may resume your regular diet  however -  remember your colon is empty and a heavy meal will produce gas. Avoid these foods:  vegetables, fried / greasy foods, carbonated drinks MEDICATIONS Regarding Aspirin or Nonsteroidal medications specifically, please see below. ACTIVITY You may resume your normal daily activities. Spend the remainder of the day resting -  avoid any strenuous activity. CALL M.D. ANY SIGN OF Increasing pain, nausea, vomiting Abdominal distension (swelling) New increased bleeding (oral or rectal) Fever (chills) Pain in chest area Bloody discharge from nose or mouth Shortness of breath You may not  take any Advil, Aspirin, Ibuprofen, Motrin, Aleve, or Goodys for 10 days, ONLY  Tylenol as needed for pain. Follow-up Instructions: 
 Call Dr. Micheal Little Results of procedure / biopsy in 10 days Telephone #  [phone] DISCHARGE SUMMARY from Nurse The following personal items collected during your admission are returned to you:  
Dental Appliance: Dental Appliances: None Vision: Visual Aid: Glasses Hearing Aid:   
Jewelry:   
Clothing:   
Other Valuables:   
Valuables sent to safe:   
 
 
 
 
  
 
 
  
  
  
Introducing [facility]! Dear Mode Flannery: Thank you for requesting a [employer] account. Our records indicate that you already have an active [employer] account. You can access your account anytime at [email or web address] Did you know that you can access your hospital and ER discharge instructions at any time in [employer]? You can also review all of your test results from your hospital stay or ER visit. Additional Information If you have questions, please visit the Frequently Asked Questions section of the [employer] website at [email or web address] Remember, MyChart is NOT to be used for urgent needs. For medical emergencies, dial 911. Now available from your iPhone and Android! Providers Seen During Your Hospitalization Provider Specialty Primary office phone Alpa Medrano MD Gastroenterology [phone] Your Primary Care Physician (PCP) Primary Care Physician Office Phone Office Fax Vickyromulo Grandeanaya OGDEN [phone] You are allergic to the following Allergen Reactions Other Food Anaphylaxis SOFT SHELL CRABS Pcn (Penicillins) Other (comments) Pt stated \"always been told PCN\" Recent Documentation Height Weight BMI Smoking Status 1.727 m 83.9 kg 28.13 kg/m2 Never Smoker Emergency Contacts Name Discharge Info Relation Home Work Mobile AkhilPrachi brannoni DISCHARGE CAREGIVER [3] Spouse [3] [phone] Roberto Cristina DISCHARGE CAREGIVER [3] Child [2] [phone] JonnieRidge III DISCHARGE CAREGIVER [3] Child [2] [phone] Patient Belongings The following personal items are in your possession at time of discharge: 
  Dental Appliances: None  Visual Aid: Glasses Please provide this summary of care documentation to your next provider. Signatures-by signing, you are acknowledging that this After Visit Summary has been reviewed with you and you have received a copy. Patient Signature:  ____________________________________________________________ Date:  ____________________________________________________________  
  
Michaela Patricia Provider Signature:  ____________________________________________________________ Date:  ____________________________________________________________

## 2018-02-15 NOTE — ANESTHESIA POSTPROCEDURE EVALUATION
Post-Anesthesia Evaluation and Assessment    Patient: Jeancarlos Guo MRN: [id number]  SSN: [id number]    YOB: 1955  Age: 65 y.o. Sex: male       Cardiovascular Function/Vital Signs  Visit Vitals    /71    Pulse (!) 101    Temp 36.6 °C (97.8 °F)    Resp 12    Ht 5' 8\" (1.727 m)    Wt 83.9 kg (185 lb)    SpO2 92%    BMI 28.13 kg/m2       Patient is status post MAC anesthesia for Procedure(s):  ESOPHAGOGASTRODUODENOSCOPY (EGD)  ESOPHAGEAL DILATION  ESOPHAGOGASTRODUODENAL (EGD) BIOPSY. Nausea/Vomiting: None    Postoperative hydration reviewed and adequate. Pain:  Pain Scale 1: Numeric (0 - 10) (02/15/18 1539)  Pain Intensity 1: 0 (02/15/18 1539)   Managed    Neurological Status: At baseline    Mental Status and Level of Consciousness: Arousable    Pulmonary Status:   O2 Device: Room air (02/15/18 1539)   Adequate oxygenation and airway patent    Complications related to anesthesia: None    Post-anesthesia assessment completed.  No concerns    Signed By: Frederick Pak MD     February 15, 2018

## 2018-02-15 NOTE — PROGRESS NOTES

## 2018-02-15 NOTE — ROUTINE PROCESS
Ally Pantoja  1955  [id number]    Situation:  Verbal report received from: Chino Roper  Procedure: Procedure(s):  ESOPHAGOGASTRODUODENOSCOPY (EGD)  ESOPHAGEAL DILATION  ESOPHAGOGASTRODUODENAL (EGD) BIOPSY    Background:    Preoperative diagnosis: CHOLANGIOCARCINOMA BILIARY TRACT, PLYORIS PRESERVING  Postoperative diagnosis: 1.- gastritis  2.- anastomotic stricture  3.- Gastric Nodules    :  Dr. Helder Meadows  Assistant(s): Endoscopy Technician-1: Kevin Amin  Endoscopy RN-1: Brayan Billingsley RN    Specimens:   ID Type Source Tests Collected by Time Destination   1 : Gastritis BX Preservative   Tomeka Squires MD 2/15/2018 1652 Pathology   2 : Gastric Nodule BX Preservative   Tomeka Squires MD 2/15/2018 1652 Pathology     H. Pylori  no    Assessment:  Intra-procedure medications   Anesthesia gave intra-procedure sedation and medications, see anesthesia flow sheet yes    Intravenous fluids: NS@ KVO     Vital signs stable     Abdominal assessment: round and soft     Recommendation:  Discharge patient per MD order.   Family or Friend Spouse  Permission to share finding with family or friend yes

## 2018-02-16 ENCOUNTER — PATIENT OUTREACH (OUTPATIENT)
Dept: OTHER | Age: 63
End: 2018-02-16

## 2018-02-16 NOTE — PROGRESS NOTES
CCM / Care Coordination check in.      2:00pm   CCM patient with EGD yesterday. Called patient for Care Management needs prior to weekend after procedure. Left discreet voice mail with my contact information encourage a call back. No returned call: Will attempt again Monday 2/19          Chart Review:  EGD with dilatation completed 2/15    Findings:  Esophagus:normal  Stomach:Eccenteric pyloric opening with stricturing of the anastomosis dilated with 12-15 mm Balloon for 1 minute. Patchy antral erythema biopsied. 5 mm Nodule biopsied. Duodenum/jejunum:normal        Therapies:  Balloon dilation of anastomotic stricture     Specimens: gastric biopsy, gastric nodule biopsy      EBL: None     Complications:   None; patient tolerated the procedure well.      Impression:    See Postoperative diagnosis above     Recommendations:  -Acid suppression with a proton pump inhibitor. , -Await pathology. , -No NSAIDS     Discharge disposition:  Home in the company of  when able to ambulate     Judy Duran MD

## 2018-02-19 ENCOUNTER — PATIENT OUTREACH (OUTPATIENT)
Dept: OTHER | Age: 63
End: 2018-02-19

## 2018-02-19 NOTE — PROGRESS NOTES
FU for [facility] patient     9:15  Called patient for Care Management FU after EGD 2/15 with dilatation and biopsies. Left discreet voice mail with my contact information encourage a call back. If no call back / Mr. Mack Nickerson has given me permission to speak to his wife/ who is a RN.      1:45 pm  Call placed to wife/ Mony Easton. Permission given in prior calls to speak to his wife. [facility] progress note   Verified  and address for HIPAA security. Changes since last call include:     Med changes :  Chemo furlough this week. Doctors appointments :  EGD with biopsy  * Mrs. Cristina reports that Alline Spring is having coffee ground emeis/ Doctor is aware and thinks it's resulting from trauma from stricture release. * Hydrating well/ but not eating. Weight is dropped #10 from 180 to #170 over 2 months. * Many well -wishers and he was not getting rest/ so she has turned off his cell phone for a few days, \"otherwise he won't rest.\"    * Discussed talking to doctor for oral coating meds// Maalox or Pepto-bysmal  For symptom relief. - Not able to take PPIs due to interaction with oral chemo. 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 - Awaiting path results of EGD to plan next steps. Check in for the patients goals:    1) Goal    :  Treatment plan compliance/ attending appointments. A) Progress -  Understands and complies with treatment. B) Barriers addressed     - Weight loss/ inability to eat. C) Utilizing strategy  - Awaiting pathology results/  Keeping physician informed of issues. D) Plan for next check in   - Call in two weeks. Patient's wife verbalized understanding of all information discussed. Pt has my contact information for any further questions, concerns, or needs. Plan for next call: two weeks/  3/5  Monday.             Chart Review:      Chart Review:  EGD with dilatation completed 2/15 Findings:  Esophagus:normal  Stomach:Eccenteric pyloric opening with stricturing of the anastomosis dilated with 12-15 mm Balloon for 1 minute.  Patchy antral erythema biopsied. 5 mm Nodule biopsied.   Duodenum/jejunum:normal          Therapies:  Balloon dilation of anastomotic stricture

## 2018-02-23 ENCOUNTER — PATIENT MESSAGE (OUTPATIENT)
Dept: SURGERY | Age: 63
End: 2018-02-23

## 2018-02-23 DIAGNOSIS — R11.2 NAUSEA AND VOMITING, INTRACTABILITY OF VOMITING NOT SPECIFIED, UNSPECIFIED VOMITING TYPE: Primary | ICD-10-CM

## 2018-02-23 RX ORDER — ONDANSETRON 8 MG/1
8 TABLET, ORALLY DISINTEGRATING ORAL
Qty: 40 TAB | Refills: 11 | Status: SHIPPED | OUTPATIENT
Start: 2018-02-23 | End: 2018-02-28 | Stop reason: SDUPTHER

## 2018-02-23 RX ORDER — ONDANSETRON 8 MG/1
TABLET, ORALLY DISINTEGRATING ORAL
Refills: 0 | COMMUNITY
Start: 2017-12-20 | End: 2018-02-23 | Stop reason: SDUPTHER

## 2018-02-28 ENCOUNTER — OFFICE VISIT (OUTPATIENT)
Dept: ONCOLOGY | Age: 63
End: 2018-02-28

## 2018-02-28 VITALS
HEIGHT: 68 IN | HEART RATE: 86 BPM | WEIGHT: 189 LBS | RESPIRATION RATE: 16 BRPM | OXYGEN SATURATION: 96 % | TEMPERATURE: 97.7 F | BODY MASS INDEX: 28.64 KG/M2 | DIASTOLIC BLOOD PRESSURE: 87 MMHG | SYSTOLIC BLOOD PRESSURE: 146 MMHG

## 2018-02-28 DIAGNOSIS — R11.2 NAUSEA AND VOMITING, INTRACTABILITY OF VOMITING NOT SPECIFIED, UNSPECIFIED VOMITING TYPE: ICD-10-CM

## 2018-02-28 DIAGNOSIS — C22.1 CHOLANGIOCARCINOMA OF BILIARY TRACT (HCC): Primary | ICD-10-CM

## 2018-02-28 DIAGNOSIS — I48.0 PAROXYSMAL ATRIAL FIBRILLATION (HCC): ICD-10-CM

## 2018-02-28 DIAGNOSIS — M35.00 SJOGREN'S SYNDROME, WITH UNSPECIFIED ORGAN INVOLVEMENT (HCC): ICD-10-CM

## 2018-02-28 RX ORDER — ONDANSETRON 8 MG/1
8 TABLET, ORALLY DISINTEGRATING ORAL
Qty: 40 TAB | Refills: 11 | Status: SHIPPED | OUTPATIENT
Start: 2018-02-28 | End: 2018-06-07 | Stop reason: ALTCHOICE

## 2018-02-28 RX ORDER — PROCHLORPERAZINE MALEATE 10 MG
TABLET ORAL
Refills: 1 | COMMUNITY
Start: 2018-02-02 | End: 2018-06-07

## 2018-02-28 RX ORDER — PANTOPRAZOLE SODIUM 40 MG/1
TABLET, DELAYED RELEASE ORAL
Refills: 5 | COMMUNITY
Start: 2018-02-15 | End: 2018-07-17

## 2018-02-28 NOTE — PROGRESS NOTES
Lesley Lema is a 65 y.o. male here today for Extrahepatic Cholangiocarcinoma f/u. S/P pancreatoduodenectomy on 10/6/17. S/P Whipple on 10/2017. 2/2/18 Labs: RBC 3.86, Hgb 12.9, HCT 37.4, Plt. 146. Patient had a EGD on 2/15/18. Patient on Xeloda; patient stated he started back taking the Xeloda on 2/26/18. Patient was nausea during office visit today; pt vomited x 1. Patient stated he forgot to take his Zofran this morning; pt stated he has not vomited in about a week. Nurse reordered Zofran. VS stable. Patient denies pain. Patient has numbness and tingling in his finger tips. Patient denies diarrhea and constipation. Good appetite.      Visit Vitals    /87 (BP 1 Location: Left arm, BP Patient Position: Sitting)    Pulse 86    Temp 97.7 °F (36.5 °C) (Oral)    Resp 16    Ht 5' 8\" (1.727 m)    Wt 189 lb (85.7 kg)    SpO2 96%    BMI 28.74 kg/m2

## 2018-02-28 NOTE — TELEPHONE ENCOUNTER
Per Nas Khan from Dr. Amado Dawson ODT 8MG EVERY 8 HOURS AS NEEDED FOR NAUSEA/AND OR VOMITING. QUANTITY 40 REFILL 11

## 2018-02-28 NOTE — PROGRESS NOTES
Follow-up Note      Patient: Brittney Jacobs MRN: [id number]  SSN: [id number]    YOB: 1955  Age: 65 y.o. Sex: male      Diagnosis:     1. 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 S/P Pancreatoduodenectomy on  10/06/2017  2. Adjuvant monotherapy with Capecitabine - Cycle 4 Day 3   started 12/4/2017    Subjective:      Brittney Jacobs is a 65 y.o. male with a diagnosis of extrahepatic cholangiocarcinoma. He presented to the ED in August 2017 with obstructive jaundice. He was found to have an obstructive lesion in the dital bile duct. The CT showed marked intrahepatic biliary dilation and common bile duct dilation to the level of the mid common bile duct, which ws markedly narrowed. He underwent a stenting procedure followed by spyglass cholangiogram. The brushings revealed a diagnosis of cholangiocarcinoma. He underwent a whipple's procedure on 10/06/2017. He is now on Xeloda. He recently had an EGD with balloon dilatation to open stricture at the pylorus. Vomiting has improved and he is gaining weight. He had an isolated incident of vomiting this morning. He otherwise feels well and continues to work. Review of Systems:    Constitutional: negative  Eyes: negative  Ears, Nose, Mouth, Throat, and Face: negative  Respiratory: negative  Cardiovascular: negative  Gastrointestinal: nausea, vomiting - improved  Genitourinary:negative  Integument/Breast: negative  Hematologic/Lymphatic: negative  Musculoskeletal:negative  Neurological: negative        Past Medical History:   Diagnosis Date    Arrhythmia     Previous a.fib, ablation, NSR now; NO LONGER FOLLOWED BY CARDIOLOGIST.     Autoimmune disease ([address]     SJOGREN'S    Cancer ([address]     COMMON BILE DUCT, ADENOCARCINOMA    Hypertension     Sepsis ([address] 2017     Past Surgical History:   Procedure Laterality Date    HX GI      COLONOSCOPY, POLYPS (BENIGN)    HX GI 10/06/2017    Viktor Mendez [facility]     HX HEART CATHETERIZATION  2005    Ablation     HX ORTHOPAEDIC  2000    Spinal fusion W/ HARDWARE    HX OTHER SURGICAL  11/07/2017    Israel Yaquelin, Dr. 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 Authorizing Provider   ondansetron (ZOFRAN ODT) 8 mg disintegrating tablet Take 1 Tab by mouth every eight (8) hours as needed for Nausea. 2/23/18  Yes Wendy Do MD   cyanocobalamin (VITAMIN B12) 1,000 mcg/mL injection 1,000 mcg by IntraMUSCular route Twice a month. Yes Historical Provider   ramipril (ALTACE) 10 mg capsule Take 1 Cap by mouth nightly. 10/26/17  Yes Silas Raya III, DO   gabapentin (NEURONTIN) 300 mg capsule Take 3 Caps by mouth two (2) times a day. 3 x 300mg caps (900mg) twice daily 10/26/17  Yes Silas Raya III, DO   calcium-cholecalciferol, d3, (CALCIUM 600 + D) 600-125 mg-unit tab Take  by mouth daily. Yes Historical Provider   cetirizine (ZYRTEC) 10 mg tablet Take  by mouth nightly. Indications: AUTOIMMUNE DISORDER, SJOGRENS. Yes Historical Provider   cholecalciferol (VITAMIN D3) 1,000 unit cap Take 5,000 Units by mouth daily.    Yes Flynn Mohan MD   pantoprazole (PROTONIX) 40 mg tablet TAKE 1 BY MOUTH EVERY MORNING FOR 30 DAYS 2/15/18   Historical Provider   prochlorperazine (COMPAZINE) 10 mg tablet TAKE ONE-HALF TABLET BY MOUTH EVERY 6 HOURS AS NEEDED FOR UP TO 7 DAYS 2/2/18   Historical Provider   metoclopramide HCl (REGLAN) 5 mg tablet TAKE 2 TABS BY MOUTH BEFORE BREAKFAST, LUNCH, AND DINNER FOR 60 DAYS. 2/7/18   Alejandro Steel MD   fluorouracil (EFUDEX) 5 % chemo cream Apply  to affected area two (2) times a day. 1/4/18   Willow Enamorado NP   HYDROcodone-acetaminophen (NORCO) 5-325 mg per tablet TAKE 1-2 TABLETS BY MOUTH EVERY 4 HOURS AS NEEDED 10/16/17   Historical Provider   aspirin 81 mg chewable tablet Take 81 mg by mouth nightly. Historical Provider          Allergies   Allergen Reactions    Other Food Anaphylaxis     SOFT SHELL CRABS    Pcn [Penicillins] Other (comments)     Pt stated \"always been told PCN\"           Objective:     Vitals:    02/28/18 0830   BP: 146/87   Pulse: 86   Resp: 16   Temp: 97.7 °F (36.5 °C)   TempSrc: Oral   SpO2: 96%   Weight: 189 lb (85.7 kg)   Height: 5' 8\" (1.727 m)            Physical Exam:    GENERAL: alert, cooperative, no distress, appears stated age  EYE: negative  LYMPHATIC: Cervical, supraclavicular, and axillary nodes normal.   THROAT & NECK: normal and no erythema or exudates noted. LUNG: clear to auscultation bilaterally  HEART: regular rate and rhythm  ABDOMEN: soft, non-tender  EXTREMITIES:  no edema  SKIN: Normal.  NEUROLOGIC: negative      Lab Results   Component Value Date/Time    WBC 6.9 02/02/2018 02:01 PM    Hemoglobin (POC) 10.3 (L) 10/06/2017 01:14 PM    HGB 12.9 02/02/2018 02:01 PM    HCT 37.4 02/02/2018 02:01 PM    PLATELET 252 (L) 60/57/6865 02:01 PM    MCV 96.9 02/02/2018 02:01 PM           Assessment:     1. Extrahepatic cholangiocarcinoma:    T3 N1 (1 of 12 LN +ve)  Invasion into pancreas  R0 resection    > S/P Pancreatoduodenectomy on  10/06/2017    On Capecitabine 1250 mg/m2 PO BID - started 12/4/2017    Receiving adjuvant monotherapy    Capecitabine - Cycle 4 Day 3    Tolerating treatment   A detailed system by system evaluation of side effect was performed to assess chemotherapy related toxicity. Blood counts are acceptable.  Results reviewed with the patient. Symptom management form reviewed with patient. 2. Nausea/Vomiting    S/P dilatation of pyloric stricture at the anastomosis  > Continue zofran as needed       3. Paroxysmal Afib    > s/p ablation, follows with Dr. Macey Templeton      4. Sjogren's Syndrome    Managed by PCP      Plan:       > Continue Capecitabine  > Zofran as needed  > Monthly labs and port flush in [facility]  > Follow-up in 6 weeks        Signed by: Clemente Lee MD                     February 28, 2018          CC. Raz Barbour MD  CC. Wilma Hurtado MD  CC. Hannah Pickens MD  CC.  Aristeo Champion MD

## 2018-03-02 ENCOUNTER — HOSPITAL ENCOUNTER (OUTPATIENT)
Dept: INFUSION THERAPY | Age: 63
Discharge: HOME | End: 2018-03-02
Payer: COMMERCIAL

## 2018-03-02 VITALS
OXYGEN SATURATION: 98 % | DIASTOLIC BLOOD PRESSURE: 79 MMHG | RESPIRATION RATE: 18 BRPM | SYSTOLIC BLOOD PRESSURE: 130 MMHG | TEMPERATURE: 98.1 F | HEART RATE: 82 BPM

## 2018-03-02 LAB
ALBUMIN SERPL-MCNC: 3.7 G/DL (ref 3.5–5)
ALBUMIN/GLOB SERPL: 1.3 {RATIO} (ref 1.1–2.2)
ALP SERPL-CCNC: 142 U/L (ref 45–117)
ALT SERPL-CCNC: 47 U/L (ref 12–78)
ANION GAP SERPL CALC-SCNC: 9 MMOL/L (ref 5–15)
AST SERPL-CCNC: 22 U/L (ref 15–37)
BASOPHILS # BLD: 0.1 K/UL (ref 0–0.1)
BASOPHILS NFR BLD: 1 % (ref 0–1)
BILIRUB SERPL-MCNC: 1.8 MG/DL (ref 0.2–1)
BUN SERPL-MCNC: 23 MG/DL (ref 6–20)
BUN/CREAT SERPL: 20 (ref 12–20)
CALCIUM SERPL-MCNC: 8.5 MG/DL (ref 8.5–10.1)
CHLORIDE SERPL-SCNC: 103 MMOL/L (ref 97–108)
CO2 SERPL-SCNC: 26 MMOL/L (ref 21–32)
CREAT SERPL-MCNC: 1.17 MG/DL (ref 0.7–1.3)
DIFFERENTIAL METHOD BLD: ABNORMAL
EOSINOPHIL # BLD: 0.2 K/UL (ref 0–0.4)
EOSINOPHIL NFR BLD: 2 % (ref 0–7)
ERYTHROCYTE [DISTWIDTH] IN BLOOD BY AUTOMATED COUNT: 17.1 % (ref 11.5–14.5)
GLOBULIN SER CALC-MCNC: 2.8 G/DL (ref 2–4)
GLUCOSE SERPL-MCNC: 148 MG/DL (ref 65–100)
HCT VFR BLD AUTO: 38.6 % (ref 36.6–50.3)
HGB BLD-MCNC: 13.2 G/DL (ref 12.1–17)
IMM GRANULOCYTES # BLD: 0.1 K/UL (ref 0–0.04)
IMM GRANULOCYTES NFR BLD AUTO: 1 % (ref 0–0.5)
LYMPHOCYTES # BLD: 0.8 K/UL (ref 0.8–3.5)
LYMPHOCYTES NFR BLD: 10 % (ref 12–49)
MCH RBC QN AUTO: 35.2 PG (ref 26–34)
MCHC RBC AUTO-ENTMCNC: 34.2 G/DL (ref 30–36.5)
MCV RBC AUTO: 102.9 FL (ref 80–99)
MONOCYTES # BLD: 0.5 K/UL (ref 0–1)
MONOCYTES NFR BLD: 7 % (ref 5–13)
NEUTS SEG # BLD: 5.9 K/UL (ref 1.8–8)
NEUTS SEG NFR BLD: 79 % (ref 32–75)
NRBC # BLD: 0 K/UL (ref 0–0.01)
NRBC BLD-RTO: 0 PER 100 WBC
PLATELET # BLD AUTO: 145 K/UL (ref 150–400)
PMV BLD AUTO: 10.9 FL (ref 8.9–12.9)
POTASSIUM SERPL-SCNC: 3.8 MMOL/L (ref 3.5–5.1)
PROT SERPL-MCNC: 6.5 G/DL (ref 6.4–8.2)
RBC # BLD AUTO: 3.75 M/UL (ref 4.1–5.7)
RBC MORPH BLD: ABNORMAL
RBC MORPH BLD: ABNORMAL
SODIUM SERPL-SCNC: 138 MMOL/L (ref 136–145)
WBC # BLD AUTO: 7.6 K/UL (ref 4.1–11.1)

## 2018-03-02 PROCEDURE — 74011000250 HC RX REV CODE- 250: Performed by: INTERNAL MEDICINE

## 2018-03-02 PROCEDURE — 36415 COLL VENOUS BLD VENIPUNCTURE: CPT | Performed by: INTERNAL MEDICINE

## 2018-03-02 PROCEDURE — 80053 COMPREHEN METABOLIC PANEL: CPT | Performed by: INTERNAL MEDICINE

## 2018-03-02 PROCEDURE — 74011250636 HC RX REV CODE- 250/636: Performed by: INTERNAL MEDICINE

## 2018-03-02 PROCEDURE — 77030012965 HC NDL HUBR BBMI -A

## 2018-03-02 PROCEDURE — 36591 DRAW BLOOD OFF VENOUS DEVICE: CPT

## 2018-03-02 PROCEDURE — 85025 COMPLETE CBC W/AUTO DIFF WBC: CPT | Performed by: INTERNAL MEDICINE

## 2018-03-02 RX ORDER — SODIUM CHLORIDE 0.9 % (FLUSH) 0.9 %
10-40 SYRINGE (ML) INJECTION AS NEEDED
Status: ACTIVE | OUTPATIENT
Start: 2018-03-02 | End: 2018-03-03

## 2018-03-02 RX ORDER — SODIUM CHLORIDE 9 MG/ML
10 INJECTION INTRAMUSCULAR; INTRAVENOUS; SUBCUTANEOUS AS NEEDED
Status: ACTIVE | OUTPATIENT
Start: 2018-03-02 | End: 2018-03-03

## 2018-03-02 RX ORDER — HEPARIN 100 UNIT/ML
500 SYRINGE INTRAVENOUS AS NEEDED
Status: ACTIVE | OUTPATIENT
Start: 2018-03-02 | End: 2018-03-03

## 2018-03-02 RX ADMIN — Medication 40 ML: at 14:20

## 2018-03-02 RX ADMIN — SODIUM CHLORIDE 10 ML: 9 INJECTION INTRAMUSCULAR; INTRAVENOUS; SUBCUTANEOUS at 14:20

## 2018-03-02 RX ADMIN — Medication 500 UNITS: at 14:22

## 2018-03-02 NOTE — PROGRESS NOTES
[facility] Note:  Arrived - 7849    Visit Vitals    /79 (BP 1 Location: Left arm, BP Patient Position: At rest)    Pulse 82    Temp 98.1 °F (36.7 °C)    Resp 18    SpO2 98%       Assessment - completed, no new concerns voiced. Port accessed, blood sluggish, several flushes used. Labs drawn, & flushed per protocol. Kovacs needle removed. CBC with diff and CMP pending at time of note. See [facility] for results. 1425 - Tolerated well. Pt denies any acute problems/changes. Discharged from [facility] ambulatory. No distress.  Next appt: 3/30/18 @

## 2018-03-05 ENCOUNTER — PATIENT OUTREACH (OUTPATIENT)
Dept: OTHER | Age: 63
End: 2018-03-05

## 2018-03-05 NOTE — PROGRESS NOTES
[facility] Check in:      10:30am  Called patient for Care Management at agreed time. Left discreet voice mail with my contact information encourage a call back. No call back. Next attempt will be Thurs 3/22        Chart Review:  Oncology OV  2/28    Vitals:     02/28/18 0830   BP: 146/87   Pulse: 86   Resp: 16   Temp: 97.7 °F (36.5 °C)   TempSrc: Oral   SpO2: 96%   Weight: 189 lb (85.7 kg)   Height: 5' 8\" (1.727 m)        Assessment:      1. Extrahepatic cholangiocarcinoma:     T3 N1 (1 of 12 LN +ve)  Invasion into pancreas  R0 resection     > S/P Pancreatoduodenectomy on  10/06/2017     On Capecitabine 1250 mg/m2 PO BID - started 12/4/2017     Receiving adjuvant monotherapy                         Capecitabine - Cycle 4 Day 3     Tolerating treatment   A detailed system by system evaluation of side effect was performed to assess chemotherapy related toxicity. Blood counts are acceptable. Results reviewed with the patient.     Symptom management form reviewed with patient.        2. Nausea/Vomiting     S/P dilatation of pyloric stricture at the anastomosis  > Continue zofran as needed         3. Paroxysmal Afib     > s/p ablation, follows with Dr. Brit Graff  4.  Sjogren's Syndrome     Managed by PCP        Plan:         > Continue Capecitabine  > Zofran as needed  > Monthly labs and port flush in [facility]  > Follow-up in 6 weeks

## 2018-03-10 DIAGNOSIS — K30 DELAYED GASTRIC EMPTYING: ICD-10-CM

## 2018-03-12 RX ORDER — ONDANSETRON 8 MG/1
TABLET, ORALLY DISINTEGRATING ORAL
Qty: 40 TAB | Refills: 0 | Status: SHIPPED | OUTPATIENT
Start: 2018-03-12 | End: 2018-06-07

## 2018-03-22 ENCOUNTER — PATIENT OUTREACH (OUTPATIENT)
Dept: OTHER | Age: 63
End: 2018-03-22

## 2018-03-22 NOTE — PROGRESS NOTES
[facility] progress note    Called Mr. Cristina  at agreed time. Verified  and address for HIPAA security. Changes since last call include:     Med changes   Doctors appointments  * On week 7 - Two more rounds left/  Be finished in 8 more weeks. *  Gastric emptying is a little better. - Nausea with vomiting about 2-3 times per week. - Not impacting weight. - Taking Reglan and Compazine for relief. - \"It's easier just to let myself throw up/ that way it's over. If I take the medications, I just have a return of the nausea in 4-6 hours. \"      - Encouraged hydration;  Periods of rest everyday. - Discussed the build up of chemo may contribute to symptoms - hopeful that this is a sign of effectiveness. * Numbness in fingers. - Discussed risk of damage and infection with lack of feeling.     - Monitors fingers regularly (easier than feet). - Most difficult for him is buttoning his shirts. * Looking forward to ending this cycle of chemo. - Anticipating good results. - Compared to [place] story- new lease on life. - Planning to attend [employer] with in-laws and spend afternoon with [employer]. Check in for the patients goals:    1) Goal    :   Medication plan for Chemo  A) Progress -  Continues Xeralto treatment plan. B) Barriers addressed -  N/V and fingertip numbness. C) Utilizing strategy  -  Medications and looking at the end of treatment. D) Plan for next check in    check in for support. Congratulated him on moving forward. Patient verbalized understanding of all information discussed. Pt has my contact information for any further questions, concerns, or needs.     Plan for next call:

## 2018-03-30 ENCOUNTER — HOSPITAL ENCOUNTER (OUTPATIENT)
Dept: INFUSION THERAPY | Age: 63
Discharge: HOME | End: 2018-03-30
Payer: COMMERCIAL

## 2018-03-30 VITALS
SYSTOLIC BLOOD PRESSURE: 114 MMHG | TEMPERATURE: 99.1 F | OXYGEN SATURATION: 97 % | RESPIRATION RATE: 18 BRPM | DIASTOLIC BLOOD PRESSURE: 71 MMHG | HEART RATE: 85 BPM

## 2018-03-30 LAB
ALBUMIN SERPL-MCNC: 3.9 G/DL (ref 3.5–5)
ALBUMIN/GLOB SERPL: 1.6 {RATIO} (ref 1.1–2.2)
ALP SERPL-CCNC: 164 U/L (ref 45–117)
ALT SERPL-CCNC: 54 U/L (ref 12–78)
ANION GAP SERPL CALC-SCNC: 7 MMOL/L (ref 5–15)
AST SERPL-CCNC: 26 U/L (ref 15–37)
BASOPHILS # BLD: 0.1 K/UL (ref 0–0.1)
BASOPHILS NFR BLD: 1 % (ref 0–1)
BILIRUB SERPL-MCNC: 1.5 MG/DL (ref 0.2–1)
BUN SERPL-MCNC: 21 MG/DL (ref 6–20)
BUN/CREAT SERPL: 17 (ref 12–20)
CALCIUM SERPL-MCNC: 8.7 MG/DL (ref 8.5–10.1)
CHLORIDE SERPL-SCNC: 106 MMOL/L (ref 97–108)
CO2 SERPL-SCNC: 25 MMOL/L (ref 21–32)
CREAT SERPL-MCNC: 1.23 MG/DL (ref 0.7–1.3)
DIFFERENTIAL METHOD BLD: ABNORMAL
EOSINOPHIL # BLD: 0.2 K/UL (ref 0–0.4)
EOSINOPHIL NFR BLD: 3 % (ref 0–7)
ERYTHROCYTE [DISTWIDTH] IN BLOOD BY AUTOMATED COUNT: 16.3 % (ref 11.5–14.5)
GLOBULIN SER CALC-MCNC: 2.5 G/DL (ref 2–4)
GLUCOSE SERPL-MCNC: 160 MG/DL (ref 65–100)
HCT VFR BLD AUTO: 38.7 % (ref 36.6–50.3)
HGB BLD-MCNC: 13.6 G/DL (ref 12.1–17)
IMM GRANULOCYTES # BLD: 0 K/UL (ref 0–0.04)
IMM GRANULOCYTES NFR BLD AUTO: 0 % (ref 0–0.5)
LYMPHOCYTES # BLD: 1 K/UL (ref 0.8–3.5)
LYMPHOCYTES NFR BLD: 13 % (ref 12–49)
MCH RBC QN AUTO: 36.3 PG (ref 26–34)
MCHC RBC AUTO-ENTMCNC: 35.1 G/DL (ref 30–36.5)
MCV RBC AUTO: 103.2 FL (ref 80–99)
MONOCYTES # BLD: 0.5 K/UL (ref 0–1)
MONOCYTES NFR BLD: 7 % (ref 5–13)
NEUTS SEG # BLD: 5.5 K/UL (ref 1.8–8)
NEUTS SEG NFR BLD: 76 % (ref 32–75)
NRBC # BLD: 0 K/UL (ref 0–0.01)
NRBC BLD-RTO: 0 PER 100 WBC
PLATELET # BLD AUTO: 160 K/UL (ref 150–400)
PMV BLD AUTO: 11.2 FL (ref 8.9–12.9)
POTASSIUM SERPL-SCNC: 3.8 MMOL/L (ref 3.5–5.1)
PROT SERPL-MCNC: 6.4 G/DL (ref 6.4–8.2)
RBC # BLD AUTO: 3.75 M/UL (ref 4.1–5.7)
SODIUM SERPL-SCNC: 138 MMOL/L (ref 136–145)
WBC # BLD AUTO: 7.3 K/UL (ref 4.1–11.1)

## 2018-03-30 PROCEDURE — 36415 COLL VENOUS BLD VENIPUNCTURE: CPT | Performed by: INTERNAL MEDICINE

## 2018-03-30 PROCEDURE — 36591 DRAW BLOOD OFF VENOUS DEVICE: CPT

## 2018-03-30 PROCEDURE — 85025 COMPLETE CBC W/AUTO DIFF WBC: CPT | Performed by: INTERNAL MEDICINE

## 2018-03-30 PROCEDURE — 80053 COMPREHEN METABOLIC PANEL: CPT | Performed by: INTERNAL MEDICINE

## 2018-03-30 PROCEDURE — 74011250636 HC RX REV CODE- 250/636: Performed by: INTERNAL MEDICINE

## 2018-03-30 PROCEDURE — 77030012965 HC NDL HUBR BBMI -A

## 2018-03-30 RX ORDER — HEPARIN 100 UNIT/ML
500 SYRINGE INTRAVENOUS AS NEEDED
Status: ACTIVE | OUTPATIENT
Start: 2018-03-30 | End: 2018-03-31

## 2018-03-30 RX ORDER — SODIUM CHLORIDE 0.9 % (FLUSH) 0.9 %
10-40 SYRINGE (ML) INJECTION AS NEEDED
Status: ACTIVE | OUTPATIENT
Start: 2018-03-30 | End: 2018-03-31

## 2018-03-30 RX ADMIN — Medication 500 UNITS: at 14:03

## 2018-03-30 RX ADMIN — Medication 20 ML: at 14:03

## 2018-03-30 NOTE — PROGRESS NOTES
[facility] Note:  Arrived - 3858  Labs obtained: CBC, CMP    Visit Vitals    /71 (BP 1 Location: Left arm, BP Patient Position: Sitting)    Pulse 85    Temp 99.1 °F (37.3 °C)    Resp 18    SpO2 97%     See [facility] for pending results  Assessment - unchanged. Port accessed & flushed per protocol w/o difficulty. Kovacs needle removed. 1405 - Tolerated well. Pt denies any acute problems/changes. Discharged from [facility] ambulatory. No distress.  Next appt: 4/27/18 at 1400

## 2018-04-05 ENCOUNTER — TELEPHONE (OUTPATIENT)
Dept: ONCOLOGY | Age: 63
End: 2018-04-05

## 2018-04-05 NOTE — TELEPHONE ENCOUNTER
Patient called and stated that he would like to extend his week off until his hands and feet are better.  # [phone]

## 2018-04-05 NOTE — TELEPHONE ENCOUNTER
After speaking with VON Martin NP, Pt advised it is ok to postpone next cycle of Xeloda by one week, next cycle to start on 4/16/18. Pt reports hands and feet are sore and red, but no open areas or blisters noted.

## 2018-04-11 ENCOUNTER — OFFICE VISIT (OUTPATIENT)
Dept: ONCOLOGY | Age: 63
End: 2018-04-11

## 2018-04-11 VITALS
BODY MASS INDEX: 28.1 KG/M2 | OXYGEN SATURATION: 96 % | SYSTOLIC BLOOD PRESSURE: 115 MMHG | HEART RATE: 74 BPM | TEMPERATURE: 98.1 F | HEIGHT: 68 IN | WEIGHT: 185.4 LBS | DIASTOLIC BLOOD PRESSURE: 72 MMHG | RESPIRATION RATE: 16 BRPM

## 2018-04-11 DIAGNOSIS — L27.1 HAND FOOT SYNDROME: ICD-10-CM

## 2018-04-11 DIAGNOSIS — C24.9 CHOLANGIOCARCINOMA DETERMINED BY BIOPSY OF BILIARY TRACT (HCC): Primary | ICD-10-CM

## 2018-04-11 NOTE — PROGRESS NOTES
Follow-up Note      Patient: Sandy Kat MRN: [id number]  SSN: [id number]    YOB: 1955  Age: 65 y.o. Sex: male      Diagnosis:     1. 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 S/P Pancreatoduodenectomy on  10/06/2017  2. Adjuvant monotherapy with Capecitabine - s/p 4 cycles   started 12/4/2017    Subjective:      Sandy Kat is a 65 y.o. male with a diagnosis of extrahepatic cholangiocarcinoma. He presented to the ED in August 2017 with obstructive jaundice. He was found to have an obstructive lesion in the dital bile duct. The CT showed marked intrahepatic biliary dilation and common bile duct dilation to the level of the mid common bile duct, which ws markedly narrowed. He underwent a stenting procedure followed by spyglass cholangiogram. The brushings revealed a diagnosis of cholangiocarcinoma. He underwent a whipple's procedure on 10/06/2017. He is now on Xeloda. He developed soreness in his hands and feet and delayed starting his next cycle. Otherwise he feels well and continues to work. Review of Systems:    Constitutional: negative  Eyes: negative  Ears, Nose, Mouth, Throat, and Face: negative  Respiratory: negative  Cardiovascular: negative  Gastrointestinal: nausea, vomiting - improved  Genitourinary:negative  Integument/Breast: tenderness hands and feet  Hematologic/Lymphatic: negative  Musculoskeletal:negative  Neurological: negative        Past Medical History:   Diagnosis Date    Arrhythmia     Previous a.fib, ablation, NSR now; NO LONGER FOLLOWED BY CARDIOLOGIST.     Autoimmune disease ([address]     SJOGREN'S    Cancer ([address]     COMMON BILE DUCT, ADENOCARCINOMA    Hypertension     Sepsis ([address] 2017     Past Surgical History:   Procedure Laterality Date    HX GI      COLONOSCOPY, POLYPS (BENIGN)    HX GI  10/06/2017    [facility]     [address]  2005    Ablation     HX ORTHOPAEDIC  2000    Spinal fusion W/ HARDWARE    HX OTHER SURGICAL  11/07/2017    Dr. Asiya Lemus  2005    [place] hole washout from cerebral hemorrhage      Family History   Problem Relation Age of Onset    Cancer Father      Breast and Colon    Cancer Mother      LEUKEMIA    No Known Problems Sister     No Known Problems Brother     No Known Problems Sister     Anesth Problems Neg Hx      Social History   Substance Use Topics    Smoking status: Never Smoker    Smokeless tobacco: Never Used    Alcohol use Yes      Comment: RARELY      Prior to Admission medications    Medication Sig Start Date End Date Taking? Authorizing Provider   ondansetron (ZOFRAN ODT) 8 mg disintegrating tablet TAKE 1 TAB BY MOUTH EVERY EIGHT (8) HOURS AS NEEDED FOR NAUSEA. 3/12/18  Yes Rubén Green MD   pantoprazole (PROTONIX) 40 mg tablet TAKE 1 BY MOUTH EVERY MORNING FOR 30 DAYS 2/15/18  Yes Historical Provider   prochlorperazine (COMPAZINE) 10 mg tablet TAKE ONE-HALF TABLET BY MOUTH EVERY 6 HOURS AS NEEDED FOR UP TO 7 DAYS 2/2/18  Yes Historical Provider   ondansetron (ZOFRAN ODT) 8 mg disintegrating tablet Take 1 Tab by mouth every eight (8) hours as needed for Nausea. 2/28/18  Yes Savanna Winn MD   HYDROcodone-acetaminophen (NORCO) 5-325 mg per tablet TAKE 1-2 TABLETS BY MOUTH EVERY 4 HOURS AS NEEDED 10/16/17  Yes Historical Provider   cyanocobalamin (VITAMIN B12) 1,000 mcg/mL injection 1,000 mcg by IntraMUSCular route Twice a month. Yes Historical Provider   ramipril (ALTACE) 10 mg capsule Take 1 Cap by mouth nightly. 10/26/17  Yes Silas Raya III, DO   gabapentin (NEURONTIN) 300 mg capsule Take 3 Caps by mouth two (2) times a day. 3 x 300mg caps (900mg) twice daily 10/26/17  Yes Silas Raya III, DO   calcium-cholecalciferol, d3, (CALCIUM 600 + D) 600-125 mg-unit tab Take  by mouth daily.    Yes Historical Provider   cetirizine (ZYRTEC) 10 mg tablet Take by mouth nightly. Indications: AUTOIMMUNE DISORDER, SJOGRENS. Yes Historical Provider   aspirin 81 mg chewable tablet Take 81 mg by mouth nightly. Yes Historical Provider   cholecalciferol (VITAMIN D3) 1,000 unit cap Take 5,000 Units by mouth daily. Yes Flynn Mohan MD   metoclopramide HCl (REGLAN) 5 mg tablet TAKE 2 TABS BY MOUTH BEFORE BREAKFAST, LUNCH, AND DINNER FOR 60 DAYS. 2/7/18   Wil Jones MD   fluorouracil (EFUDEX) 5 % chemo cream Apply  to affected area two (2) times a day. 1/4/18   Catana Calamity, NP          Allergies   Allergen Reactions    Other Food Anaphylaxis     SOFT SHELL CRABS    Pcn [Penicillins] Other (comments)     Pt stated \"always been told PCN\"           Objective:     Vitals:    04/11/18 0924   BP: 115/72   Pulse: 74   Resp: 16   Temp: 98.1 °F (36.7 °C)   TempSrc: Oral   SpO2: 96%   Weight: 185 lb 6.4 oz (84.1 kg)   Height: 5' 8\" (1.727 m)            Physical Exam:    GENERAL: alert, cooperative, no distress, appears stated age  EYE: negative  LYMPHATIC: Cervical, supraclavicular, and axillary nodes normal.   THROAT & NECK: normal and no erythema or exudates noted. LUNG: clear to auscultation bilaterally  HEART: regular rate and rhythm  ABDOMEN: soft, non-tender  EXTREMITIES:  no edema  SKIN: Normal.  NEUROLOGIC: negative      Lab Results   Component Value Date/Time    WBC 7.3 03/30/2018 01:59 PM    Hemoglobin (POC) 10.3 (L) 10/06/2017 01:14 PM    HGB 13.6 03/30/2018 01:59 PM    HCT 38.7 03/30/2018 01:59 PM    PLATELET 793 19/64/6807 01:59 PM    .2 (H) 03/30/2018 01:59 PM           Assessment:     1.  Extrahepatic cholangiocarcinoma:    T3 N1 (1 of 12 LN +ve)  Invasion into pancreas  R0 resection    > S/P Pancreatoduodenectomy on  10/06/2017    On Capecitabine 1250 mg/m2 PO BID - started 12/4/2017    Receiving adjuvant monotherapy    Capecitabine - s/p 4 cycles    Reduce capecitabine from 2500 mg to 2000 mg BID when starting Cycle 5 on 4/16/2018 (reduced d/t hand-foot syndrome)    Tolerating treatment   + grade I/II hand-foot syndrome  A detailed system by system evaluation of side effect was performed to assess chemotherapy related toxicity. Blood counts are acceptable. Results reviewed with the patient. Symptom management form reviewed with patient. 2. Paroxysmal Afib    > s/p ablation, follows with Dr. Monique Wild      4. Sjogren's Syndrome    Managed by PCP      Plan:       > Reduce Capecitabine to 2000 mg BID - start cycle 5 on 4/16/2018  > CT Abd Pelv  > Zofran as needed  > Monthly labs and port flush in [facility]  > Follow-up in 6 weeks      I saw the patient in conjunction with Rj Nunes NP. Signed by: Tavia Nolasco MD                     April 11, 2018          CC. Maggie Bustillo MD  CC. Lamonte Wu MD  CC. Chuy Pressley MD  CC.  Kristopher Hartley MD

## 2018-04-11 NOTE — PROGRESS NOTES
Follow-up Note      Patient: Gracie Collier MRN: [id number]  SSN: [id number]    YOB: 1955  Age: 65 y.o. Sex: male      Diagnosis:     1. 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 S/P Pancreatoduodenectomy on  10/06/2017  2. Adjuvant monotherapy with Capecitabine - s/p 5 Cycles   started 12/4/2017    Subjective:      Gracie Collier is a 65 y.o. male with a diagnosis of extrahepatic cholangiocarcinoma. He presented to the ED in August 2017 with obstructive jaundice. He was found to have an obstructive lesion in the dital bile duct. The CT showed marked intrahepatic biliary dilation and common bile duct dilation to the level of the mid common bile duct, which ws markedly narrowed. He underwent a stenting procedure followed by spyglass cholangiogram. The brushings revealed a diagnosis of cholangiocarcinoma. He underwent a whipple's procedure on 10/06/2017. He is now on Xeloda. He recently had an EGD with balloon dilatation to open stricture at the pylorus. Vomiting has improved and he is gaining weight. He had an isolated incident of vomiting this morning. He otherwise feels well and continues to work. Review of Systems:    Constitutional: negative  Eyes: negative  Ears, Nose, Mouth, Throat, and Face: negative  Respiratory: negative  Cardiovascular: negative  Gastrointestinal: nausea, vomiting - improved  Genitourinary:negative  Integument/Breast: negative  Hematologic/Lymphatic: negative  Musculoskeletal:negative  Neurological: negative        Past Medical History:   Diagnosis Date    Arrhythmia     Previous a.fib, ablation, NSR now; NO LONGER FOLLOWED BY CARDIOLOGIST.     Autoimmune disease ([address]     SJOGREN'S    Cancer ([address]     COMMON BILE DUCT, ADENOCARCINOMA    Hypertension     Sepsis ([address] 2017     Past Surgical History:   Procedure Laterality Date    HX GI      COLONOSCOPY, POLYPS (BENIGN)    HX GI 10/06/2017    [facility]     HX HEART CATHETERIZATION  2005    Ablation     HX ORTHOPAEDIC  2000    Spinal fusion W/ HARDWARE    HX OTHER SURGICAL  11/07/2017    Sirael Cath, Dr. Lissett Welch  2005    [place] hole washout from cerebral hemorrhage      Family History   Problem Relation Age of Onset    Cancer Father      Breast and Colon    Cancer Mother      LEUKEMIA    No Known Problems Sister     No Known Problems Brother     No Known Problems Sister     Anesth Problems Neg Hx      Social History   Substance Use Topics    Smoking status: Never Smoker    Smokeless tobacco: Never Used    Alcohol use Yes      Comment: RARELY      Prior to Admission medications    Medication Sig Start Date End Date Taking? Authorizing Provider   ondansetron (ZOFRAN ODT) 8 mg disintegrating tablet TAKE 1 TAB BY MOUTH EVERY EIGHT (8) HOURS AS NEEDED FOR NAUSEA. 3/12/18  Yes Burton Damico MD   pantoprazole (PROTONIX) 40 mg tablet TAKE 1 BY MOUTH EVERY MORNING FOR 30 DAYS 2/15/18  Yes Historical Provider   prochlorperazine (COMPAZINE) 10 mg tablet TAKE ONE-HALF TABLET BY MOUTH EVERY 6 HOURS AS NEEDED FOR UP TO 7 DAYS 2/2/18  Yes Historical Provider   ondansetron (ZOFRAN ODT) 8 mg disintegrating tablet Take 1 Tab by mouth every eight (8) hours as needed for Nausea. 2/28/18  Yes Huber Delcid MD   HYDROcodone-acetaminophen (NORCO) 5-325 mg per tablet TAKE 1-2 TABLETS BY MOUTH EVERY 4 HOURS AS NEEDED 10/16/17  Yes Historical Provider   cyanocobalamin (VITAMIN B12) 1,000 mcg/mL injection 1,000 mcg by IntraMUSCular route Twice a month. Yes Historical Provider   ramipril (ALTACE) 10 mg capsule Take 1 Cap by mouth nightly. 10/26/17  Yes Silas Raya III, DO   gabapentin (NEURONTIN) 300 mg capsule Take 3 Caps by mouth two (2) times a day.  3 x 300mg caps (900mg) twice daily 10/26/17  Yes Silas Raya III, DO   calcium-cholecalciferol, d3, (CALCIUM 600 + D) 600-125 mg-unit tab Take  by mouth daily. Yes Historical Provider   cetirizine (ZYRTEC) 10 mg tablet Take  by mouth nightly. Indications: AUTOIMMUNE DISORDER, SJOGRENS. Yes Historical Provider   aspirin 81 mg chewable tablet Take 81 mg by mouth nightly. Yes Historical Provider   cholecalciferol (VITAMIN D3) 1,000 unit cap Take 5,000 Units by mouth daily. Yes Flynn Mohan MD   metoclopramide HCl (REGLAN) 5 mg tablet TAKE 2 TABS BY MOUTH BEFORE BREAKFAST, LUNCH, AND DINNER FOR 60 DAYS. 2/7/18   Burton Damico MD   fluorouracil (EFUDEX) 5 % chemo cream Apply  to affected area two (2) times a day. 1/4/18   Eduardo Ritchie NP          Allergies   Allergen Reactions    Other Food Anaphylaxis     SOFT SHELL CRABS    Pcn [Penicillins] Other (comments)     Pt stated \"always been told PCN\"           Objective:     Vitals:    04/11/18 0924   BP: 115/72   Pulse: 74   Resp: 16   Temp: 98.1 °F (36.7 °C)   TempSrc: Oral   SpO2: 96%   Weight: 185 lb 6.4 oz (84.1 kg)   Height: 5' 8\" (1.727 m)            Physical Exam:    GENERAL: alert, cooperative, no distress, appears stated age  EYE: negative  LYMPHATIC: Cervical, supraclavicular, and axillary nodes normal.   THROAT & NECK: normal and no erythema or exudates noted. LUNG: clear to auscultation bilaterally  HEART: regular rate and rhythm  ABDOMEN: soft, non-tender  EXTREMITIES:  no edema  SKIN: Normal.  NEUROLOGIC: negative      Lab Results   Component Value Date/Time    WBC 7.3 03/30/2018 01:59 PM    Hemoglobin (POC) 10.3 (L) 10/06/2017 01:14 PM    HGB 13.6 03/30/2018 01:59 PM    HCT 38.7 03/30/2018 01:59 PM    PLATELET 039 50/23/2705 01:59 PM    .2 (H) 03/30/2018 01:59 PM           Assessment:     1.  Extrahepatic cholangiocarcinoma:    T3 N1 (1 of 12 LN +ve)  Invasion into pancreas  R0 resection    > S/P Pancreatoduodenectomy on  10/06/2017    On Capecitabine 1250 mg/m2 PO BID - started 12/4/2017    Receiving adjuvant monotherapy    Capecitabine - s/p 5 cycles     Has hand foot syndrome from [place]. Gr I/II  Managing with topical lotion and narcotics. I suggested we not use narcotics. Instead rely on dose adjustment and topical therapies. A detailed system by system evaluation of side effect was performed to assess chemotherapy related toxicity. Blood counts are acceptable. Results reviewed with the patient. Symptom management form reviewed with patient. 2. Paroxysmal Afib    > s/p ablation, follows with Dr. Seema Garza      4. Sjogren's Syndrome    Managed by PCP      Plan:       > Continue Capecitabine  > Zofran as needed  > Follow-up in 6 weeks  > CT abd/pelvis        Signed by: Dl Hughes MD                     April 11, 2018          CC. Brionna Mcknight MD  CC. Doug Knight MD  CC. Dimitris Oconnor MD  CC.  Milo Coyle MD

## 2018-04-19 ENCOUNTER — PATIENT OUTREACH (OUTPATIENT)
Dept: OTHER | Age: 63
End: 2018-04-19

## 2018-04-19 NOTE — PROGRESS NOTES
[facility] progress note    Called Mr. Cristina  at agreed time. Verified  and address for HIPAA security. Changes since last call include:     Med changes :  Started cycle 5 of 6 /  Reduce capecitabine from 2500 mg to 2000 BID   Doctors appointments :  Onoclogy FU - see below. * Mr. Jose Kerr is doing well. - Hopeful for complete remission - with Full body scan planned for .     - Reduction in Xeloda dose has helped hand symptoms. 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 - Worried about cold snap coming.    - Talked about how he protects his hands with chemo/   He wears gloves every day/ careful not to injure himself/  And uses 'utter balm' on his hands. He has \"tendency to have cracks in my fingers. \"   He knows his is potential danger with chemotherapy and infection risk. * Discussed finding nessa in life. - Focus especially since cancer diagnosis. - Really enjoyed Easter this year/   [employer] hunting.     - \"It's amazing what a little candy will do. \"      - Discussed taking time every day to relax. This is hard for him to do. \"That's just not natural for me, but I know it's important. Denny Britton (wife) takes my phone away on weekends. \"      Check in for the patients goals:    1) Goal    :   PCP in place/  Chemo in process. A) Progress -  Cycle #5 of 6 / reduced dose. B) Barriers addressed - prevent hand injury/ infection while under chemo. C) Utilizing strategy - protective gear/ gloves and moisturizer/ assessments  D) Plan for next check in   - call after MRI     Congratulated him on moving forward. Patient verbalized understanding of all information discussed. Pt has my contact information for any further questions, concerns, or needs. Plan for next call:            Chart Review:    Assessment:      1.  Extrahepatic cholangiocarcinoma:     T3 N1 (1 of 12 LN +ve)  Invasion into pancreas  R0 resection     > S/P Pancreatoduodenectomy on 10/06/2017     On Capecitabine 1250 mg/m2 PO BID - started 12/4/2017     Receiving adjuvant monotherapy                         Capecitabine - s/p 4 cycles     Reduce capecitabine from 2500 mg to 2000 mg BID when starting Cycle 5 on 4/16/2018 (reduced d/t hand-foot syndrome)     Tolerating treatment   + grade I/II hand-foot syndrome  A detailed system by system evaluation of side effect was performed to assess chemotherapy related toxicity. Blood counts are acceptable. Results reviewed with the patient.     Symptom management form reviewed with patient.        2. Paroxysmal Afib     > s/p ablation, follows with Dr. Erlni Moore  4.  Sjogren's Syndrome     Managed by PCP        Plan:         > Reduce Capecitabine to 2000 mg BID - start cycle 5 on 4/16/2018  > CT Abd Pelv  > Zofran as needed  > Monthly labs and port flush in [facility]  > Follow-up in 6 weeks        I saw the patient in conjunction with Gabo Allison NP.           Signed by: Isabella Bridges MD                     April 11, 2018

## 2018-04-27 ENCOUNTER — HOSPITAL ENCOUNTER (OUTPATIENT)
Dept: INFUSION THERAPY | Age: 63
Discharge: HOME | End: 2018-04-27
Payer: COMMERCIAL

## 2018-04-27 VITALS
DIASTOLIC BLOOD PRESSURE: 67 MMHG | HEART RATE: 79 BPM | TEMPERATURE: 98.3 F | SYSTOLIC BLOOD PRESSURE: 117 MMHG | OXYGEN SATURATION: 97 % | RESPIRATION RATE: 18 BRPM

## 2018-04-27 LAB
ALBUMIN SERPL-MCNC: 3.5 G/DL (ref 3.5–5)
ALBUMIN/GLOB SERPL: 1.5 {RATIO} (ref 1.1–2.2)
ALP SERPL-CCNC: 145 U/L (ref 45–117)
ALT SERPL-CCNC: 52 U/L (ref 12–78)
ANION GAP SERPL CALC-SCNC: 6 MMOL/L (ref 5–15)
AST SERPL-CCNC: 22 U/L (ref 15–37)
BASOPHILS # BLD: 0 K/UL (ref 0–0.1)
BASOPHILS NFR BLD: 0 % (ref 0–1)
BILIRUB SERPL-MCNC: 1 MG/DL (ref 0.2–1)
BUN SERPL-MCNC: 17 MG/DL (ref 6–20)
BUN/CREAT SERPL: 18 (ref 12–20)
CALCIUM SERPL-MCNC: 8.6 MG/DL (ref 8.5–10.1)
CHLORIDE SERPL-SCNC: 106 MMOL/L (ref 97–108)
CO2 SERPL-SCNC: 27 MMOL/L (ref 21–32)
CREAT SERPL-MCNC: 0.96 MG/DL (ref 0.7–1.3)
DIFFERENTIAL METHOD BLD: ABNORMAL
EOSINOPHIL # BLD: 0.2 K/UL (ref 0–0.4)
EOSINOPHIL NFR BLD: 3 % (ref 0–7)
ERYTHROCYTE [DISTWIDTH] IN BLOOD BY AUTOMATED COUNT: 15.7 % (ref 11.5–14.5)
GLOBULIN SER CALC-MCNC: 2.4 G/DL (ref 2–4)
GLUCOSE SERPL-MCNC: 234 MG/DL (ref 65–100)
HCT VFR BLD AUTO: 38.1 % (ref 36.6–50.3)
HGB BLD-MCNC: 13.1 G/DL (ref 12.1–17)
IMM GRANULOCYTES # BLD: 0.1 K/UL (ref 0–0.04)
IMM GRANULOCYTES NFR BLD AUTO: 1 % (ref 0–0.5)
LYMPHOCYTES # BLD: 1.2 K/UL (ref 0.8–3.5)
LYMPHOCYTES NFR BLD: 17 % (ref 12–49)
MCH RBC QN AUTO: 34.9 PG (ref 26–34)
MCHC RBC AUTO-ENTMCNC: 34.4 G/DL (ref 30–36.5)
MCV RBC AUTO: 101.6 FL (ref 80–99)
MONOCYTES # BLD: 0.5 K/UL (ref 0–1)
MONOCYTES NFR BLD: 8 % (ref 5–13)
NEUTS SEG # BLD: 4.8 K/UL (ref 1.8–8)
NEUTS SEG NFR BLD: 71 % (ref 32–75)
NRBC # BLD: 0 K/UL (ref 0–0.01)
NRBC BLD-RTO: 0 PER 100 WBC
PLATELET # BLD AUTO: 155 K/UL (ref 150–400)
PMV BLD AUTO: 11.2 FL (ref 8.9–12.9)
POTASSIUM SERPL-SCNC: 3.9 MMOL/L (ref 3.5–5.1)
PROT SERPL-MCNC: 5.9 G/DL (ref 6.4–8.2)
RBC # BLD AUTO: 3.75 M/UL (ref 4.1–5.7)
SODIUM SERPL-SCNC: 139 MMOL/L (ref 136–145)
WBC # BLD AUTO: 6.8 K/UL (ref 4.1–11.1)

## 2018-04-27 PROCEDURE — 85025 COMPLETE CBC W/AUTO DIFF WBC: CPT | Performed by: INTERNAL MEDICINE

## 2018-04-27 PROCEDURE — 36591 DRAW BLOOD OFF VENOUS DEVICE: CPT

## 2018-04-27 PROCEDURE — 36415 COLL VENOUS BLD VENIPUNCTURE: CPT | Performed by: INTERNAL MEDICINE

## 2018-04-27 PROCEDURE — 77030012965 HC NDL HUBR BBMI -A

## 2018-04-27 PROCEDURE — 80053 COMPREHEN METABOLIC PANEL: CPT | Performed by: INTERNAL MEDICINE

## 2018-04-27 PROCEDURE — 74011250636 HC RX REV CODE- 250/636: Performed by: INTERNAL MEDICINE

## 2018-04-27 RX ORDER — HEPARIN 100 UNIT/ML
500 SYRINGE INTRAVENOUS AS NEEDED
Status: ACTIVE | OUTPATIENT
Start: 2018-04-27 | End: 2018-04-28

## 2018-04-27 RX ORDER — SODIUM CHLORIDE 0.9 % (FLUSH) 0.9 %
10-40 SYRINGE (ML) INJECTION AS NEEDED
Status: ACTIVE | OUTPATIENT
Start: 2018-04-27 | End: 2018-04-28

## 2018-04-27 RX ADMIN — Medication 40 ML: at 14:22

## 2018-04-27 RX ADMIN — HEPARIN 500 UNITS: 100 SYRINGE at 14:22

## 2018-04-27 NOTE — PROGRESS NOTES
[facility] Note:  Arrived - 2985     Visit Vitals    /67 (BP 1 Location: Left arm, BP Patient Position: At rest;Sitting)    Pulse 79    Temp 98.3 °F (36.8 °C)    Resp 18    SpO2 97%     Labs:   Recent Results (from the past 12 hour(s))   CBC WITH AUTOMATED DIFF    Collection Time: 04/27/18  2:10 PM   Result Value Ref Range    WBC 6.8 4.1 - 11.1 K/uL    RBC 3.75 (L) 4.10 - 5.70 M/uL    HGB 13.1 12.1 - 17.0 g/dL    HCT 38.1 36.6 - 50.3 %    .6 (H) 80.0 - 99.0 FL    MCH 34.9 (H) 26.0 - 34.0 PG    MCHC 34.4 30.0 - 36.5 g/dL    RDW 15.7 (H) 11.5 - 14.5 %    PLATELET 114 507 - 758 K/uL    MPV 11.2 8.9 - 12.9 FL    NRBC 0.0 0  WBC    ABSOLUTE NRBC 0.00 0.00 - 0.01 K/uL    NEUTROPHILS 71 32 - 75 %    LYMPHOCYTES 17 12 - 49 %    MONOCYTES 8 5 - 13 %    EOSINOPHILS 3 0 - 7 %    BASOPHILS 0 0 - 1 %    IMMATURE GRANULOCYTES 1 (H) 0.0 - 0.5 %    ABS. NEUTROPHILS 4.8 1.8 - 8.0 K/UL    ABS. LYMPHOCYTES 1.2 0.8 - 3.5 K/UL    ABS. MONOCYTES 0.5 0.0 - 1.0 K/UL    ABS. EOSINOPHILS 0.2 0.0 - 0.4 K/UL    ABS. BASOPHILS 0.0 0.0 - 0.1 K/UL    ABS. IMM. GRANS. 0.1 (H) 0.00 - 0.04 K/UL    DF AUTOMATED     METABOLIC PANEL, COMPREHENSIVE    Collection Time: 04/27/18  2:10 PM   Result Value Ref Range    Sodium 139 136 - 145 mmol/L    Potassium 3.9 3.5 - 5.1 mmol/L    Chloride 106 97 - 108 mmol/L    CO2 27 21 - 32 mmol/L    Anion gap 6 5 - 15 mmol/L    Glucose 234 (H) 65 - 100 mg/dL    BUN 17 6 - 20 MG/DL    Creatinine 0.96 0.70 - 1.30 MG/DL    BUN/Creatinine ratio 18 12 - 20      GFR est AA >60 >60 ml/min/1.73m2    GFR est non-AA >60 >60 ml/min/1.73m2    Calcium 8.6 8.5 - 10.1 MG/DL    Bilirubin, total 1.0 0.2 - 1.0 MG/DL    ALT (SGPT) 52 12 - 78 U/L    AST (SGOT) 22 15 - 37 U/L    Alk. phosphatase 145 (H) 45 - 117 U/L    Protein, total 5.9 (L) 6.4 - 8.2 g/dL    Albumin 3.5 3.5 - 5.0 g/dL    Globulin 2.4 2.0 - 4.0 g/dL    A-G Ratio 1.5 1.1 - 2.2       Assessment - unchanged.  No complaints or concerns    Port accessed & flushed per protocol w/o difficulty. Positive for blood return. Kovacs needle removed. 1425 - Tolerated well. Pt denies any acute problems/changes. Discharged from [facility] ambulatory. No distress.  Next appt: 5/25/18 @ 2 pm.

## 2018-05-16 ENCOUNTER — HOSPITAL ENCOUNTER (OUTPATIENT)
Dept: CT IMAGING | Age: 63
Discharge: HOME | End: 2018-05-16
Attending: INTERNAL MEDICINE
Payer: COMMERCIAL

## 2018-05-16 DIAGNOSIS — C24.9 CHOLANGIOCARCINOMA DETERMINED BY BIOPSY OF BILIARY TRACT (HCC): ICD-10-CM

## 2018-05-16 PROCEDURE — 74011000255 HC RX REV CODE- 255: Performed by: INTERNAL MEDICINE

## 2018-05-16 PROCEDURE — 74011636320 HC RX REV CODE- 636/320: Performed by: INTERNAL MEDICINE

## 2018-05-16 PROCEDURE — 74177 CT ABD & PELVIS W/CONTRAST: CPT

## 2018-05-16 RX ORDER — BARIUM SULFATE 20 MG/ML
900 SUSPENSION ORAL
Status: COMPLETED | OUTPATIENT
Start: 2018-05-16 | End: 2018-05-16

## 2018-05-16 RX ORDER — SODIUM CHLORIDE 9 MG/ML
50 INJECTION, SOLUTION INTRAVENOUS
Status: DISPENSED | OUTPATIENT
Start: 2018-05-16 | End: 2018-05-16

## 2018-05-16 RX ORDER — SODIUM CHLORIDE 0.9 % (FLUSH) 0.9 %
10 SYRINGE (ML) INJECTION
Status: DISPENSED | OUTPATIENT
Start: 2018-05-16 | End: 2018-05-16

## 2018-05-16 RX ADMIN — IOPAMIDOL 95 ML: 755 INJECTION, SOLUTION INTRAVENOUS at 09:08

## 2018-05-16 RX ADMIN — BARIUM SULFATE 900 ML: 21 SUSPENSION ORAL at 09:07

## 2018-05-17 ENCOUNTER — PATIENT OUTREACH (OUTPATIENT)
Dept: OTHER | Age: 63
End: 2018-05-17

## 2018-05-17 NOTE — PROGRESS NOTES
[facility] Progress note. 8:30am  Called patient for Care Management at agreed time. Left discreet voice mail with my contact information encourage a call back. * CT scan yesterday for staging disease;   FU apt planned 5/23. If no return call, next outreach planned for 5/24 Thursday. Chart Review:   CT 5/16 for staging  FINDINGS:  INCIDENTALLY IMAGED CHEST:  Heart/vessels: Within normal limits. Lungs/Pleura: Within normal limits. .  ABDOMEN:  Liver: Diffuse, diminished attenuation throughout the liver suggesting hepatic  steatosis. .  Gallbladder/Biliary: Pneumobilia. Status post cholecystectomy  Spleen: Within normal limits. Pancreas: Status post partial pancreatectomy. Tiny, low-attenuation lesion in  the body of the pancreas is unchanged and likely of no clinical significance. There is slight distention of the pancreatic duct. Adrenals: Within normal limits. Kidneys: Exophytic, low-attenuation lesion projecting off the left kidney,  incompletely characterized and not significant changed. Low-attenuation area in  the upper pole of left kidney, incompletely characterized, too small to  accurately characterize and not significantly changed. Peritoneum/Mesenteries: Within normal limits. Extraperitoneum: Resolution of the fluid collection in the right upper quadrant. Gastrointestinal tract: Postsurgical changes suggestive of Whipple procedure. Diverticulosis in the descending and sigmoid colon without evidence of  diverticulitis. Normal-appearing appendix  Vascular: Calcifications in the aorta. Michaelyn Daily PELVIS:  Extraperitoneum: Early in filling of the external iliac veins bilaterally. Ureters: Within normal limits. Bladder: Within normal limits. Reproductive System: Calcifications in the prostate. .  MSK:   Status post L5 laminectomy with posterior pedicle screw and sebastian fixation of  L5/S1 with grade 1 anterolisthesis of L5 on S1. Remote, left-sided rib fractures  .    Impression:     IMPRESSION: 1. The patient is status post Whipple procedure without evidence of residual or  recurrent disease. 2. Incidental findings as above.

## 2018-05-23 ENCOUNTER — OFFICE VISIT (OUTPATIENT)
Dept: ONCOLOGY | Age: 63
End: 2018-05-23

## 2018-05-23 VITALS
WEIGHT: 189.2 LBS | SYSTOLIC BLOOD PRESSURE: 126 MMHG | DIASTOLIC BLOOD PRESSURE: 78 MMHG | HEIGHT: 68 IN | OXYGEN SATURATION: 95 % | TEMPERATURE: 98.4 F | BODY MASS INDEX: 28.67 KG/M2 | HEART RATE: 85 BPM | RESPIRATION RATE: 16 BRPM

## 2018-05-23 DIAGNOSIS — C22.1 CHOLANGIOCARCINOMA OF BILIARY TRACT (HCC): Primary | ICD-10-CM

## 2018-05-23 DIAGNOSIS — C24.9 CHOLANGIOCARCINOMA DETERMINED BY BIOPSY OF BILIARY TRACT (HCC): ICD-10-CM

## 2018-05-23 NOTE — PROGRESS NOTES
Anjum Monreal is a 65 y.o. male here today for Extrahepatic Cholangio; T3 N1 f/u. S/P whipple 10/17. Completed Xeloda. Scans from 5/16 show remission. Patient states he his scheduled for surgery; esophagus stricture on 6/4. VS stable. Patient states he has b/l heel pain; left heel worse than right heel; pain 3/10 but getting better. Good appetite. Patient denies diarrhea and constipation. Patient states he has numbness and tingling in his finger tips. Patient denies mouth ulcers. Patient denies cough. Patient denies SOB. Patient denies falls. Patient states he has nausea; pt either takes a anti emetic, depending on if he's at home or going to [facility] because he feels bad after taking the antiemetic; pt states he rather vomit because he feels better when he vomits. Visit Vitals    /78 (BP 1 Location: Left arm, BP Patient Position: Sitting)    Pulse 85    Temp 98.4 °F (36.9 °C) (Oral)    Resp 16    Ht 5' 8\" (1.727 m)    Wt 189 lb 3.2 oz (85.8 kg)    SpO2 95%    BMI 28.77 kg/m2     Health Maintenance Review: Patient reminded of \"due or due soon\" health maintenance. I have asked the patient to contact his/her primary care provider (PCP) for follow-up on his/her health maintenance.

## 2018-05-24 ENCOUNTER — PATIENT OUTREACH (OUTPATIENT)
Dept: OTHER | Age: 63
End: 2018-05-24

## 2018-05-24 NOTE — PROGRESS NOTES
ERP made aware of temperature at this time.   [facility] progress note    Called Mr. Cristina  at agreed time. Verified  and address for HIPAA security. Changes since last call include:     Med changes :  Completed Chemotherapy. Doctors appointments :  FU with oncology/ Gurvinder    * Finished Chemo. Rough side effects/ but able to stay at higher dose of Xeloda. - Waiting to get PICC line removed - expecting call. - Shared that I see an apt [facility] for tomorrow/ but can't see what is planned. - Wife is RN/  Cautioned for high risk of infection. * Needs dilatation    - * / upper and lower GI planned. on / or Whipple revision.     - Can't take PPI with chemo. - Throwing up frequently- almost daily. .    - No substantial weight loss/ but N/V is hard to live with.     - Mr. Cristina is resistant to more surgery/ explained that laproscopic would not be as drastic as first Whipple/ removal.   Encouraged him to think about it/ but wait for results of dilatation. Explained that this may take multiple (2-3 tries to be successful)    * Neuropathy in hands and feet. - Very little feeling in fingers. -  Lost toenails    - Skin sloughing on feet. - No infection/ frequent monitoring. No prescribed treatments. - Encouraged frequent foot checks/ not feeling can make injury hard to spot. - Encouraged change of socks and ensuring shoes are clean and dry. Use sunlight/ open shoe to air when not wearing. 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     - Again cautioned about wet feet/ infection risk. - He is having his sons do most of the work. Health Maintenance Due   Topic Date Due    Colonoscopy  2011     * His wife,  Mrs. Hannah Rosales is sick with URI. - Again cautioned of high risk of infection    Check in for the patients goals:    1) Goal    :  Medication adherence/ oncology support.     A) Progress -  Finished chemo       B) Barriers addressed  - treatment side effects/ N/V and neuropathy- capilarry damage from chemo. C) Utilizing strategy - close monitoring for infection with PICC line removal and neuropathy. D) Plan for next check in           Advanced Directive:  Patient offered information on development of Advanced Care Planning. * Patient has AMP and is requested to bring to hospital or [facility] office for scanning. Congratulated him on moving forward. Patient's current stage of activation:     Action-  Ongoing support;  Reinforcement of change attempts; Expect set-backs;  Rolling with resistance; OARS  Maintenance - Developing a plan for maintenance and relapse prevention. Patient verbalized understanding of all information discussed. Pt has my contact information for any further questions, concerns, or needs. Plan for next call: 6/28 - or after any dilatation occur. Chart Review:     Onc FU visit 5/23        Vitals:     05/23/18 1429   BP: 126/78   Pulse: 85   Resp: 16   Temp: 98.4 °F (36.9 °C)   TempSrc: Oral   SpO2: 95%   Weight: 189 lb 3.2 oz (85.8 kg)   Height: 5' 8\" (1.727 m)      Assessment:      1. Extrahepatic cholangiocarcinoma:     T3 N1 (1 of 12 LN +ve)  Invasion into pancreas  R0 resection     > S/P Pancreatoduodenectomy on  10/06/2017     On Capecitabine 1250 mg/m2 PO BID - started 12/4/2017     Receiving adjuvant monotherapy                         Capecitabine - s/p 4 cycles     Reduce capecitabine from 2500 mg to 2000 mg BID when starting Cycle 5 on 4/16/2018 (reduced d/t hand-foot syndrome)     Completed adjuvant treatment with single agent Dori. Rash in foot is improving  Scheduled to undergo for dilatation of esophageal strictutre  At this point I will do surveillance and labs q3 months    Plan:         > CT Abd Pelv in 3 months and follow up in clinic  > [address] [place] [address] port      CT scan 4/11 - most recent.     ABDOMEN:  Liver: Diffuse, diminished attenuation throughout the liver suggesting hepatic  steatosis. .  Gallbladder/Biliary: Pneumobilia. Status post cholecystectomy  Spleen: Within normal limits. Pancreas: Status post partial pancreatectomy. Tiny, low-attenuation lesion in  the body of the pancreas is unchanged and likely of no clinical significance. There is slight distention of the pancreatic duct. Adrenals: Within normal limits. Kidneys: Exophytic, low-attenuation lesion projecting off the left kidney,  incompletely characterized and not significant changed. Low-attenuation area in  the upper pole of left kidney, incompletely characterized, too small to  accurately characterize and not significantly changed. Peritoneum/Mesenteries: Within normal limits. Extraperitoneum: Resolution of the fluid collection in the right upper quadrant. Gastrointestinal tract: Postsurgical changes suggestive of Whipple procedure. Diverticulosis in the descending and sigmoid colon without evidence of  diverticulitis. Normal-appearing appendix  Vascular: Calcifications in the aorta.

## 2018-05-24 NOTE — PROGRESS NOTES
Follow-up Note      Patient: Isa Britton MRN: [id number]  SSN: [id number]    YOB: 1955  Age: 65 y.o. Sex: male      Diagnosis:     1. 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 S/P Pancreatoduodenectomy on  10/06/2017  2. Adjuvant monotherapy with Capecitabine - s/p 6 cycles   (12/4/2017 - 05/2018)    Subjective:      Isa Britton is a 65 y.o. male with a diagnosis of extrahepatic cholangiocarcinoma. He presented to the ED in August 2017 with obstructive jaundice. He was found to have an obstructive lesion in the dital bile duct. The CT showed marked intrahepatic biliary dilation and common bile duct dilation to the level of the mid common bile duct, which ws markedly narrowed. He underwent a stenting procedure followed by spyglass cholangiogram. The brushings revealed a diagnosis of cholangiocarcinoma. He underwent a whipple's procedure on 10/06/2017. He has completed 6 cycles of adjuvant Xeloda. He still has ongoing nausea and odynophagia due to esophageal stricture. He has esophageal dilatation is scheduled. Otherwise he feels well and continues to work. Review of Systems:    Constitutional: negative  Eyes: negative  Ears, Nose, Mouth, Throat, and Face: negative  Respiratory: negative  Cardiovascular: negative  Gastrointestinal: nausea, vomiting - improved  Genitourinary:negative  Integument/Breast: tenderness hands and feet  Hematologic/Lymphatic: negative  Musculoskeletal:negative  Neurological: negative        Past Medical History:   Diagnosis Date    Arrhythmia     Previous a.fib, ablation, NSR now; NO LONGER FOLLOWED BY CARDIOLOGIST.     Autoimmune disease ([address]     SJOGREN'S    Cancer ([address]     COMMON BILE DUCT, ADENOCARCINOMA    Hypertension     Sepsis ([address] 2017     Past Surgical History:   Procedure Laterality Date    HX GI      COLONOSCOPY, POLYPS (BENIGN)    HX GI  10/06/2017    Viktor Benavidez [address] [facility]     HX HEART CATHETERIZATION  2005    Ablation     HX ORTHOPAEDIC  2000    Spinal fusion W/ HARDWARE    HX OTHER SURGICAL  11/07/2017    [facility] Yaquelin, Dr. Vivek Mcdonald  2005    Ting hole washout from cerebral hemorrhage      Family History   Problem Relation Age of Onset    Cancer Father      Breast and Colon    Cancer Mother      LEUKEMIA    No Known Problems Sister     No Known Problems Brother     No Known Problems Sister     Anesth Problems Neg Hx      Social History   Substance Use Topics    Smoking status: Never Smoker    Smokeless tobacco: Never Used    Alcohol use Yes      Comment: RARELY      Prior to Admission medications    Medication Sig Start Date End Date Taking? Authorizing Provider   ondansetron (ZOFRAN ODT) 8 mg disintegrating tablet TAKE 1 TAB BY MOUTH EVERY EIGHT (8) HOURS AS NEEDED FOR NAUSEA. 3/12/18  Yes Tram Menjivar MD   prochlorperazine (COMPAZINE) 10 mg tablet TAKE ONE-HALF TABLET BY MOUTH EVERY 6 HOURS AS NEEDED FOR UP TO 7 DAYS 2/2/18  Yes Historical Provider   ondansetron (ZOFRAN ODT) 8 mg disintegrating tablet Take 1 Tab by mouth every eight (8) hours as needed for Nausea. 2/28/18  Yes Jaqueline Murrieta MD   metoclopramide HCl (REGLAN) 5 mg tablet TAKE 2 TABS BY MOUTH BEFORE BREAKFAST, LUNCH, AND DINNER FOR 60 DAYS. 2/7/18  Yes Tram Menjivar MD   fluorouracil (EFUDEX) 5 % chemo cream Apply  to affected area two (2) times a day. 1/4/18  Yes Carol Mccormick NP   HYDROcodone-acetaminophen (NORCO) 5-325 mg per tablet TAKE 1-2 TABLETS BY MOUTH EVERY 4 HOURS AS NEEDED 10/16/17  Yes Historical Provider   cyanocobalamin (VITAMIN B12) 1,000 mcg/mL injection 1,000 mcg by IntraMUSCular route Twice a month. Yes Historical Provider   ramipril (ALTACE) 10 mg capsule Take 1 Cap by mouth nightly. 10/26/17  Yes Silas Raya III, DO   gabapentin (NEURONTIN) 300 mg capsule Take 3 Caps by mouth two (2) times a day.  3 x 300mg caps (900mg) twice daily 10/26/17  Yes Silas Raya III, DO   calcium-cholecalciferol, d3, (CALCIUM 600 + D) 600-125 mg-unit tab Take  by mouth daily. Yes Historical Provider   cetirizine (ZYRTEC) 10 mg tablet Take  by mouth nightly. Indications: AUTOIMMUNE DISORDER, SJOGRENS. Yes Historical Provider   aspirin 81 mg chewable tablet Take 81 mg by mouth nightly. Yes Historical Provider   cholecalciferol (VITAMIN D3) 1,000 unit cap Take 5,000 Units by mouth daily. Yes Phys Other, MD   pantoprazole (PROTONIX) 40 mg tablet TAKE 1 BY MOUTH EVERY MORNING FOR 30 DAYS 2/15/18   Historical Provider          Allergies   Allergen Reactions    Other Food Anaphylaxis     SOFT SHELL CRABS    Pcn [Penicillins] Other (comments)     Pt stated \"always been told PCN\"           Objective:     Vitals:    05/23/18 1429   BP: 126/78   Pulse: 85   Resp: 16   Temp: 98.4 °F (36.9 °C)   TempSrc: Oral   SpO2: 95%   Weight: 189 lb 3.2 oz (85.8 kg)   Height: 5' 8\" (1.727 m)            Physical Exam:    GENERAL: alert, cooperative, no distress, appears stated age  EYE: negative  LYMPHATIC: Cervical, supraclavicular, and axillary nodes normal.   THROAT & NECK: normal and no erythema or exudates noted. LUNG: clear to auscultation bilaterally  HEART: regular rate and rhythm  ABDOMEN: soft, non-tender  EXTREMITIES:  no edema  SKIN: Normal.  NEUROLOGIC: negative        Assessment:     1. Extrahepatic cholangiocarcinoma:    T3 N1 (1 of 12 LN +ve)  Invasion into pancreas  R0 resection    > S/P Pancreatoduodenectomy on  10/06/2017    On Capecitabine 1250 mg/m2 PO BID - started 12/4/2017    Receiving adjuvant monotherapy    Capecitabine - s/p 4 cycles    Reduce capecitabine from 2500 mg to 2000 mg BID when starting Cycle 5 on 4/16/2018 (reduced d/t hand-foot syndrome)    Completed adjuvant treatment with single agent Dori.    Rash in foot is improving  Scheduled to undergo for dilatation of esophageal strictutre  At this point I will do surveillance and labs q3 months      Plan:       > CT Abd Pelv in 3 months and follow up in clinic  > [address] [place] [address] port      I saw the patient in conjunction with Marcie Mittal NP. Signed by: Hector Millan MD                     May 23, 2018          CC. Vero Valencia MD  CC. Dusitn Burch MD  CC. Dm Hanley MD  CC.  Roberto Walden MD

## 2018-05-25 ENCOUNTER — HOSPITAL ENCOUNTER (OUTPATIENT)
Dept: INFUSION THERAPY | Age: 63
Discharge: HOME | End: 2018-05-25
Payer: COMMERCIAL

## 2018-05-25 VITALS
DIASTOLIC BLOOD PRESSURE: 65 MMHG | SYSTOLIC BLOOD PRESSURE: 114 MMHG | HEART RATE: 87 BPM | TEMPERATURE: 98.2 F | RESPIRATION RATE: 18 BRPM

## 2018-05-25 LAB
ALBUMIN SERPL-MCNC: 3.6 G/DL (ref 3.5–5)
ALBUMIN/GLOB SERPL: 1.3 {RATIO} (ref 1.1–2.2)
ALP SERPL-CCNC: 183 U/L (ref 45–117)
ALT SERPL-CCNC: 60 U/L (ref 12–78)
ANION GAP SERPL CALC-SCNC: 6 MMOL/L (ref 5–15)
AST SERPL-CCNC: 31 U/L (ref 15–37)
BASOPHILS # BLD: 0 K/UL (ref 0–0.1)
BASOPHILS NFR BLD: 1 % (ref 0–1)
BILIRUB SERPL-MCNC: 0.9 MG/DL (ref 0.2–1)
BUN SERPL-MCNC: 20 MG/DL (ref 6–20)
BUN/CREAT SERPL: 18 (ref 12–20)
CALCIUM SERPL-MCNC: 8.6 MG/DL (ref 8.5–10.1)
CHLORIDE SERPL-SCNC: 106 MMOL/L (ref 97–108)
CO2 SERPL-SCNC: 27 MMOL/L (ref 21–32)
CREAT SERPL-MCNC: 1.13 MG/DL (ref 0.7–1.3)
DIFFERENTIAL METHOD BLD: ABNORMAL
EOSINOPHIL # BLD: 0.3 K/UL (ref 0–0.4)
EOSINOPHIL NFR BLD: 6 % (ref 0–7)
ERYTHROCYTE [DISTWIDTH] IN BLOOD BY AUTOMATED COUNT: 15.3 % (ref 11.5–14.5)
GLOBULIN SER CALC-MCNC: 2.8 G/DL (ref 2–4)
GLUCOSE SERPL-MCNC: 229 MG/DL (ref 65–100)
HCT VFR BLD AUTO: 41.4 % (ref 36.6–50.3)
HGB BLD-MCNC: 14.2 G/DL (ref 12.1–17)
IMM GRANULOCYTES # BLD: 0.1 K/UL (ref 0–0.04)
IMM GRANULOCYTES NFR BLD AUTO: 1 % (ref 0–0.5)
LYMPHOCYTES # BLD: 1.3 K/UL (ref 0.8–3.5)
LYMPHOCYTES NFR BLD: 20 % (ref 12–49)
MCH RBC QN AUTO: 34.5 PG (ref 26–34)
MCHC RBC AUTO-ENTMCNC: 34.3 G/DL (ref 30–36.5)
MCV RBC AUTO: 100.5 FL (ref 80–99)
MONOCYTES # BLD: 0.6 K/UL (ref 0–1)
MONOCYTES NFR BLD: 9 % (ref 5–13)
NEUTS SEG # BLD: 4 K/UL (ref 1.8–8)
NEUTS SEG NFR BLD: 64 % (ref 32–75)
NRBC # BLD: 0 K/UL (ref 0–0.01)
NRBC BLD-RTO: 0 PER 100 WBC
PLATELET # BLD AUTO: 152 K/UL (ref 150–400)
PMV BLD AUTO: 11.5 FL (ref 8.9–12.9)
POTASSIUM SERPL-SCNC: 3.6 MMOL/L (ref 3.5–5.1)
PROT SERPL-MCNC: 6.4 G/DL (ref 6.4–8.2)
RBC # BLD AUTO: 4.12 M/UL (ref 4.1–5.7)
SODIUM SERPL-SCNC: 139 MMOL/L (ref 136–145)
WBC # BLD AUTO: 6.2 K/UL (ref 4.1–11.1)

## 2018-05-25 PROCEDURE — 85025 COMPLETE CBC W/AUTO DIFF WBC: CPT | Performed by: INTERNAL MEDICINE

## 2018-05-25 PROCEDURE — 77030012965 HC NDL HUBR BBMI -A

## 2018-05-25 PROCEDURE — 80053 COMPREHEN METABOLIC PANEL: CPT | Performed by: INTERNAL MEDICINE

## 2018-05-25 PROCEDURE — 36415 COLL VENOUS BLD VENIPUNCTURE: CPT | Performed by: INTERNAL MEDICINE

## 2018-05-25 PROCEDURE — 74011000250 HC RX REV CODE- 250: Performed by: INTERNAL MEDICINE

## 2018-05-25 PROCEDURE — 36591 DRAW BLOOD OFF VENOUS DEVICE: CPT

## 2018-05-25 PROCEDURE — 74011250636 HC RX REV CODE- 250/636: Performed by: INTERNAL MEDICINE

## 2018-05-25 RX ORDER — SODIUM CHLORIDE 9 MG/ML
10 INJECTION INTRAMUSCULAR; INTRAVENOUS; SUBCUTANEOUS AS NEEDED
Status: SHIPPED | OUTPATIENT
Start: 2018-05-25 | End: 2018-05-26

## 2018-05-25 RX ORDER — HEPARIN 100 UNIT/ML
500 SYRINGE INTRAVENOUS AS NEEDED
Status: SHIPPED | OUTPATIENT
Start: 2018-05-25 | End: 2018-05-26

## 2018-05-25 RX ORDER — SODIUM CHLORIDE 0.9 % (FLUSH) 0.9 %
10-40 SYRINGE (ML) INJECTION AS NEEDED
Status: SHIPPED | OUTPATIENT
Start: 2018-05-25 | End: 2018-05-26

## 2018-05-25 RX ADMIN — Medication 500 UNITS: at 14:01

## 2018-05-25 RX ADMIN — SODIUM CHLORIDE 10 ML: 9 INJECTION INTRAMUSCULAR; INTRAVENOUS; SUBCUTANEOUS at 14:00

## 2018-05-25 RX ADMIN — Medication 10 ML: at 14:01

## 2018-05-25 NOTE — PROGRESS NOTES
[facility] Note:  Arrived - 1350     Visit Vitals    /65 (BP 1 Location: Left arm, BP Patient Position: Sitting)    Pulse 87    Temp 98.2 °F (36.8 °C)    Resp 18       Assessment - unchanged. Port accessed, labs drawn (CBC with diff and CMP) & flushed per protocol w/o difficulty. Kovacs needle removed. See [facility] for pending labs. 1405 - Tolerated well. Pt denies any acute problems/changes. Discharged from [facility] ambulatory. No distress. Next appt: 6/22/18 @ 1400.

## 2018-06-04 ENCOUNTER — TELEPHONE (OUTPATIENT)
Dept: INTERNAL MEDICINE CLINIC | Age: 63
End: 2018-06-04

## 2018-06-04 NOTE — TELEPHONE ENCOUNTER
Spoke with patient after 2 patient identifiers being note and advised of appt on Thursday, June 7, 2018 10:15 AM, patient is aware. Patient expressed understanding and has no further questions at this time.

## 2018-06-04 NOTE — TELEPHONE ENCOUNTER
----- Message from Jakub Jacobsen sent at 6/4/2018  8:23 AM EDT -----  Regarding: /Telephone  Juan Del Valle, pt spouse is requesting a call back in reference to the pt elevated blood sugar. Pt has an appt scheduled on 06/07/18 at 10:15am but is requesting a sooner appt.  P [phone]    Message copied/pasted from [facility]

## 2018-06-07 ENCOUNTER — OFFICE VISIT (OUTPATIENT)
Dept: INTERNAL MEDICINE CLINIC | Age: 63
End: 2018-06-07

## 2018-06-07 VITALS
SYSTOLIC BLOOD PRESSURE: 114 MMHG | HEART RATE: 69 BPM | BODY MASS INDEX: 28.49 KG/M2 | WEIGHT: 188 LBS | OXYGEN SATURATION: 98 % | HEIGHT: 68 IN | DIASTOLIC BLOOD PRESSURE: 57 MMHG | TEMPERATURE: 97.8 F | RESPIRATION RATE: 16 BRPM

## 2018-06-07 DIAGNOSIS — I48.0 PAROXYSMAL ATRIAL FIBRILLATION (HCC): Chronic | ICD-10-CM

## 2018-06-07 DIAGNOSIS — R73.9 HYPERGLYCEMIA: Primary | ICD-10-CM

## 2018-06-07 DIAGNOSIS — I10 ESSENTIAL HYPERTENSION: Chronic | ICD-10-CM

## 2018-06-07 DIAGNOSIS — C22.1 CHOLANGIOCARCINOMA OF BILIARY TRACT (HCC): ICD-10-CM

## 2018-06-07 NOTE — PROGRESS NOTES
Reviewed record in preparation for visit and have obtained necessary documentation. Identified pt with two pt identifiers(name and ). Chief Complaint   Patient presents with    Blood sugar problem     fasting; per [facility]fusion [facility], blood sugar reading was 215; x2 weeks ago       Health Maintenance Due   Topic Date Due    COLONOSCOPY  2011       Mr. Gabby Gallo has a reminder for a \"due or due soon\" health maintenance. I have asked that he discuss health maintenance topic(s) due with His  primary care provider. Coordination of Care Questionnaire:  :     1) Have you been to an emergency room, urgent care clinic since your last visit? no   Hospitalized since your last visit? no             2) Have you seen or consulted any other health care providers outside of [address] [place] [address] since your last visit? no  (Include any pap smears or colon screenings in this section.)    3) Do you have an Advance Directive on file? no    4) Are you interested in receiving information on Advance Directives? NO    Patient is accompanied by self I have received verbal consent from Penelope Mak to discuss any/all medical information while they are present in the room.

## 2018-06-07 NOTE — PROGRESS NOTES
Adela Marsh is a 65 y.o. male who presents for evaluation of hyperglycemia. Last seen by me oct 26, 2017 in new pt visit. Since then has just finished chemo for cholangiocarcinoma. His last 2 lab draws had sugars 240s. Comes in today for further evaluation. Feels fine, getting stronger daily as done with chemo (finished about 3 weeks ago). ROS:  Constitutional: negative for fevers, chills, anorexia and weight loss  Eyes:   negative for visual disturbance and irritation  ENT:   negative for tinnitus,sore throat,nasal congestion,ear pain,hoarseness  Respiratory:  negative for cough, hemoptysis, dyspnea,wheezing  CV:   negative for chest pain, palpitations, lower extremity edema  GI:   negative for nausea, vomiting, diarrhea, abdominal pain,melena  Genitourinary: negative for frequency, dysuria and hematuria  Musculoskel: negative for myalgias, arthralgias, back pain, muscle weakness, joint pain  Neurological:  negative for headaches, dizziness, focal weakness, numbness  Psychiatric:     Negative for depression or anxiety      Past Medical History:   Diagnosis Date    Arrhythmia     Previous a.fib, ablation, NSR now; NO LONGER FOLLOWED BY CARDIOLOGIST.     Autoimmune disease ([address]     SJOGREN'S    Cancer ([address]     COMMON BILE DUCT, ADENOCARCINOMA    Hypertension     Sepsis ([address] 2017       Past Surgical History:   Procedure Laterality Date    HX GI      COLONOSCOPY, POLYPS (BENIGN)    HX GI  10/06/2017    [facility]ik[facility] Marcum [address] [place], [place] [address]  [facility]    Ablation     HX ORTHOPAEDIC  2000    Spinal fusion W/ HARDWARE    HX OTHER SURGICAL  11/07/2017    [place] Cath, Dr. Eric Mclean  2005    [place] hole washout from cerebral hemorrhage       Family History   Problem Relation Age of Onset    Cancer Father      Breast and Colon    Cancer Mother      LEUKEMIA    No Known Problems Sister     No Known Problems Brother     No Known Problems Sister    [employer] Anesth Problems Neg Hx        Social History     Social History    Marital status:      Spouse name: N/A    Number of children: N/A    Years of education: N/A     Occupational History    Not on file. Social History Main Topics    Smoking status: Never Smoker    Smokeless tobacco: Never Used    Alcohol use Yes      Comment: RARELY    Drug use: No    Sexual activity: Yes     Partners: Female     Other Topics Concern    Not on file     Social History Narrative            Visit Vitals    /57 (BP 1 Location: Left arm, BP Patient Position: Sitting)    Pulse 69    Temp 97.8 °F (36.6 °C) (Oral)    Resp 16    Ht 5' 8\" (1.727 m)    Wt 188 lb (85.3 kg)    SpO2 98%    BMI 28.59 kg/m2       Physical Examination:   General - Well appearing male  HEENT - PERRL, TM no erythema/opacification, normal nasal turbinates, no oropharyngeal erythema or exudate, MMM  Neck - supple, no bruits, no thyroidomegaly, no lymphadenopathy  Pulm - clear to auscultation bilaterally  Cardio - RRR, normal S1 S2, no murmur  Abd - soft, nontender, no masses, no HSM  Extrem - no edema, +2 distal pulses  Neuro-  No focal deficits, CN intact     Assessment/Plan:    1. Hyperglycemia--check bmp, a1c. Will start glucophage if needed, as well as refer to baldemar chung if needed. 2.   pafib--remains nsr, asked to resume asa  3.  htn-controlled with altace  4. Cholangiocarcinoma--finished chemo in may. Recent CT scans clear. rtc tbd based on a1c.         Jordy Duarte III, DO

## 2018-06-07 NOTE — MR AVS SNAPSHOT
[address] [place] 
[phone] Patient: Anjali Christianson MRN: [id number]:[id number] Visit Information Date & Time Provider Department Dept. Phone Encounter #  
 6/7/2018 10:15 AM Linda Haynes, [address] [facility] [phone] [id number] Follow-up Instructions Return in about 3 months (around 9/7/2018). Your Appointments 8/24/2018 11:30 AM  
ESTABLISHED PATIENT with Mirlande Reis MD  
[address] [facility]) Appt Note: onc 3 mth f/u  
 [address] [facility] [address] [id number]  
[address] [place] [address] [address] [facility] [address] Jose Balderas Upcoming Health Maintenance Date Due COLONOSCOPY 1/24/2011 Influenza Age 5 to Adult 8/1/2018 Pneumococcal 19-64 Highest Risk (3 of 3 - PPSV23) 1/4/2022 DTaP/Tdap/Td series (2 - Td) 1/7/2023 Allergies as of 6/7/2018  Review Complete On: 6/7/2018 By: Jai Ponce III, DO Severity Noted Reaction Type Reactions Other Food High 09/29/2017    Anaphylaxis SOFT SHELL CRABS Pcn [Penicillins]  07/23/2017    Other (comments) Pt stated \"always been told PCN\" Current Immunizations  Reviewed on 5/25/2018 Name Date Pneumococcal Conjugate (PCV-13) 10/26/2017 Pneumococcal Polysaccharide (PPSV-23) 1/4/2017 Not reviewed this visit You Were Diagnosed With   
  
 Codes Comments Hyperglycemia    -  Primary ICD-10-CM: R73.9 ICD-9-CM: 790.29 Cholangiocarcinoma of biliary tract ([facility] [address]     ICD-10-CM: C24.9 ICD-9-CM: 156.9 Essential hypertension     ICD-10-CM: I10 
ICD-9-CM: 401.9 Paroxysmal atrial fibrillation (HCC)     ICD-10-CM: I48.0 ICD-9-CM: 427.31 Vitals BP Pulse Temp Resp Height(growth percentile) Weight(growth percentile) 114/57 (BP 1 Location: Left arm, BP Patient Position: Sitting) 69 97.8 °F (36.6 °C) (Oral) 16 5' 8\" (1.727 m) 188 lb (85.3 kg) SpO2 BMI Smoking Status 98% 28.59 kg/m2 Never Smoker Vitals History BMI and BSA Data Body Mass Index Body Surface Area 28.59 kg/m 2 2.02 m 2 Preferred Pharmacy Pharmacy Name Phone [facility]/PHARM[facility]Y [facility] - Marisela Bridges, [address] [place] [address] [phone] Your Updated Medication List  
  
   
This list is accurate as of 6/7/18 11:17 AM.  Always use your most recent med list.  
  
  
  
  
 aspirin 81 mg chewable tablet Take 81 mg by mouth nightly. cyanocobalamin 1,000 mcg/mL injection Commonly known as:  VITAMIN B12  
1,000 mcg by IntraMUSCular route Twice a month.  
  
 gabapentin 300 mg capsule Commonly known as:  NEURONTIN Take 3 Caps by mouth two (2) times a day. 3 x 300mg caps (900mg) twice daily  
  
 pantoprazole 40 mg tablet Commonly known as:  PROTONIX  
TAKE 1 BY MOUTH EVERY MORNING FOR 30 DAYS  
  
 ramipril 10 mg capsule Commonly known as:  ALTACE Take 1 Cap by mouth nightly. VITAMIN D3 1,000 unit Cap Generic drug:  cholecalciferol Take 5,000 Units by mouth daily. ZyrTEC 10 mg tablet Generic drug:  cetirizine Take  by mouth nightly. Indications: AUTOIMMUNE DISORDER, SJOGRENS. We Performed the Following HEMOGLOBIN A1C WITH EAG [65662 CPT(R)] METABOLIC PANEL, BASIC [76958 CPT(R)] Follow-up Instructions Return in about 3 months (around 9/7/2018). To-Do List   
 06/22/2018 2:00 PM  
  Appointment with CHAIR 2 [facility] ([phone])  
  
 07/20/2018 2:00 PM  
  Appointment with [address] [place] [address] ([phone]) Patient Instructions Learning About High Blood Sugar What is high blood sugar?  
 
Your body turns the food you eat into glucose (sugar), which it uses for energy. But if your body isn't able to use the sugar right away, it can build up in your blood and lead to high blood sugar. When the amount of sugar in your blood stays too high for too much of the time, you may have diabetes. Diabetes is a disease that can cause serious health problems. The good news is that lifestyle changes may help you get your blood sugar back to normal and avoid or delay diabetes. What causes high blood sugar? Sugar (glucose) can build up in your blood if you: · Are overweight. · Have a family history of diabetes. · Take certain medicines, such as steroids. What are the symptoms? Having high blood sugar may not cause any symptoms at all. Or it may make you feel very thirsty or very hungry. You may also urinate more often than usual, have blurry vision, or lose weight without trying. How is high blood sugar treated? You can take steps to lower your blood sugar level if you understand what makes it get higher. Your doctor may want you to learn how to test your blood sugar level at home. Then you can see how illness, stress, or different kinds of food or medicine raise or lower your blood sugar level. Other tests may be needed to see if you have diabetes. How can you prevent high blood sugar? · Watch your weight. If you're overweight, losing just a small amount of weight may help. Reducing fat around your waist is most important. · Limit the amount of calories, sweets, and unhealthy fat you eat. Ask your doctor if a dietitian can help you. A registered dietitian can help you create meal plans that fit your lifestyle. · Get at least 30 minutes of exercise on most days of the week. Exercise helps control your blood sugar. It also helps you maintain a healthy weight. Walking is a good choice. You also may want to do other activities, such as running, swimming, cycling, or playing tennis or team sports. · If your doctor prescribed medicines, take them exactly as prescribed. Call your doctor if you think you are having a problem with your medicine. You will get more details on the specific medicines your doctor prescribes. Follow-up care is a key part of your treatment and safety. Be sure to make and go to all appointments, and call your doctor if you are having problems. It's also a good idea to know your test results and keep a list of the medicines you take. Where can you learn more? Go to http://sandra-jenelle.info/. Enter O108 in the search box to learn more about \"Learning About High Blood Sugar. \" Current as of: March 13, 2017 Content Version: 11.4 © 7282-4840 [employer]. Care instructions adapted under license by [employer] (which disclaims liability or warranty for this information). If you have questions about a medical condition or this instruction, always ask your healthcare professional. [address]  any warranty or liability for your use of this information. Introducing [facility]! Dear Dulce Maria Son: Thank you for requesting a Evostor account. Our records indicate that you already have an active Evostor account. You can access your account anytime at [email or web address] Did you know that you can access your hospital and ER discharge instructions at any time in Evostor? You can also review all of your test results from your hospital stay or ER visit. Additional Information If you have questions, please visit the Frequently Asked Questions section of the Evostor website at [email or web address] Remember, Evostor is NOT to be used for urgent needs. For medical emergencies, dial 911. Now available from your iPhone and Android! Please provide this summary of care documentation to your next provider. Your primary care clinician is listed as Professor Hawk 108  If you have any questions after today's visit, please call [phone].

## 2018-06-08 ENCOUNTER — PATIENT OUTREACH (OUTPATIENT)
Dept: INTERNAL MEDICINE CLINIC | Age: 63
End: 2018-06-08

## 2018-06-08 LAB
BUN SERPL-MCNC: 14 MG/DL (ref 8–27)
BUN/CREAT SERPL: 17 (ref 10–24)
CALCIUM SERPL-MCNC: 9.2 MG/DL (ref 8.6–10.2)
CHLORIDE SERPL-SCNC: 104 MMOL/L (ref 96–106)
CO2 SERPL-SCNC: 24 MMOL/L (ref 18–29)
CREAT SERPL-MCNC: 0.83 MG/DL (ref 0.76–1.27)
EST. AVERAGE GLUCOSE BLD GHB EST-MCNC: 140 MG/DL
GFR SERPLBLD CREATININE-BSD FMLA CKD-EPI: 110 ML/MIN/1.73
GFR SERPLBLD CREATININE-BSD FMLA CKD-EPI: 95 ML/MIN/1.73
GLUCOSE SERPL-MCNC: 125 MG/DL (ref 65–99)
HBA1C MFR BLD: 6.5 % (ref 4.8–5.6)
POTASSIUM SERPL-SCNC: 4 MMOL/L (ref 3.5–5.2)
SODIUM SERPL-SCNC: 142 MMOL/L (ref 134–144)

## 2018-06-08 NOTE — PROGRESS NOTES
[phone] ([place]) -  scheduled appt with Og Carmichael for dietary adjustments on July 12th, 9 AM.

## 2018-06-08 NOTE — PROGRESS NOTES
Result Notes   Notes Recorded by Niko Hernandez RN on 6/8/2018 at 12:16 PM  [phone] (home) - pt scheduled appt with [facility] for dietary adjustments on July 12th, 9 AM.    ------    Notes Recorded by Cheryle Alcide, LPN on 8/1/2720 at 0:84 AM  Lab results reviewed with patient. Sanjiv Carson verbalized understanding and does not have any questions at this time.  Patient expecting call from [facility] to schedule an appointment.    ------    Notes Recorded by Charmaine Arevalo III, DO on 6/8/2018 at 7:30 AM  Right on the cusp of diabetes. Reshma Harperers if due to chemo, but I think we can try dietary adjustments for a few months with goal of losing 15 lbs over next 6 months and see if that helps, before needing to start glucophage.  Would like him to meet with baldemar.

## 2018-06-08 NOTE — PROGRESS NOTES
Lab results reviewed with patient. Patient verbalized understanding and does not have any questions at this time. Patient expecting call from [facility] to schedule an appointment.

## 2018-06-08 NOTE — PROGRESS NOTES
Right on the cusp of diabetes. Unclear if due to chemo, but I think we can try dietary adjustments for a few months with goal of losing 15 lbs over next 6 months and see if that helps, before needing to start glucophage. Would like him to meet with baldemar.

## 2018-06-14 ENCOUNTER — ANESTHESIA EVENT (OUTPATIENT)
Dept: SURGERY | Age: 63
End: 2018-06-14
Payer: COMMERCIAL

## 2018-06-15 ENCOUNTER — HOSPITAL ENCOUNTER (OUTPATIENT)
Age: 63
Setting detail: OUTPATIENT SURGERY
Discharge: HOME | End: 2018-06-15
Attending: SURGERY | Admitting: SURGERY
Payer: COMMERCIAL

## 2018-06-15 ENCOUNTER — DOCUMENTATION ONLY (OUTPATIENT)
Dept: OTHER | Age: 63
End: 2018-06-15

## 2018-06-15 ENCOUNTER — ANESTHESIA (OUTPATIENT)
Dept: SURGERY | Age: 63
End: 2018-06-15
Payer: COMMERCIAL

## 2018-06-15 VITALS
BODY MASS INDEX: 28.04 KG/M2 | SYSTOLIC BLOOD PRESSURE: 129 MMHG | TEMPERATURE: 97.6 F | OXYGEN SATURATION: 96 % | HEART RATE: 60 BPM | WEIGHT: 185 LBS | HEIGHT: 68 IN | RESPIRATION RATE: 14 BRPM | DIASTOLIC BLOOD PRESSURE: 74 MMHG

## 2018-06-15 DIAGNOSIS — C22.1 CHOLANGIOCARCINOMA OF BILIARY TRACT (HCC): Primary | ICD-10-CM

## 2018-06-15 PROCEDURE — 77030002986 HC SUT PROL J&J -A: Performed by: SURGERY

## 2018-06-15 PROCEDURE — 74011250636 HC RX REV CODE- 250/636: Performed by: ANESTHESIOLOGY

## 2018-06-15 PROCEDURE — 74011250636 HC RX REV CODE- 250/636: Performed by: SURGERY

## 2018-06-15 PROCEDURE — 74011250636 HC RX REV CODE- 250/636

## 2018-06-15 PROCEDURE — 77030032490 HC SLV COMPR SCD KNE COVD -B: Performed by: SURGERY

## 2018-06-15 PROCEDURE — 77030031139 HC SUT VCRL2 J&J -A: Performed by: SURGERY

## 2018-06-15 PROCEDURE — 76210000020 HC REC RM PH II FIRST 0.5 HR: Performed by: SURGERY

## 2018-06-15 PROCEDURE — 77030020782 HC GWN BAIR PAWS FLX 3M -B

## 2018-06-15 PROCEDURE — 76060000032 HC ANESTHESIA 0.5 TO 1 HR: Performed by: SURGERY

## 2018-06-15 PROCEDURE — 76210000016 HC OR PH I REC 1 TO 1.5 HR: Performed by: SURGERY

## 2018-06-15 PROCEDURE — 76010000138 HC OR TIME 0.5 TO 1 HR: Performed by: SURGERY

## 2018-06-15 PROCEDURE — 77030011640 HC PAD GRND REM COVD -A: Performed by: SURGERY

## 2018-06-15 PROCEDURE — 77030002933 HC SUT MCRYL J&J -A: Performed by: SURGERY

## 2018-06-15 PROCEDURE — 77030039266 HC ADH SKN EXOFIN S2SG -A: Performed by: SURGERY

## 2018-06-15 PROCEDURE — 74011000250 HC RX REV CODE- 250: Performed by: SURGERY

## 2018-06-15 RX ORDER — MIDAZOLAM HYDROCHLORIDE 1 MG/ML
1 INJECTION, SOLUTION INTRAMUSCULAR; INTRAVENOUS AS NEEDED
Status: DISCONTINUED | OUTPATIENT
Start: 2018-06-15 | End: 2018-06-15 | Stop reason: HOSPADM

## 2018-06-15 RX ORDER — HYDROCODONE BITARTRATE AND ACETAMINOPHEN 5; 325 MG/1; MG/1
1 TABLET ORAL AS NEEDED
Status: DISCONTINUED | OUTPATIENT
Start: 2018-06-15 | End: 2018-06-15 | Stop reason: HOSPADM

## 2018-06-15 RX ORDER — ROPIVACAINE HYDROCHLORIDE 5 MG/ML
30 INJECTION, SOLUTION EPIDURAL; INFILTRATION; PERINEURAL AS NEEDED
Status: DISCONTINUED | OUTPATIENT
Start: 2018-06-15 | End: 2018-06-15 | Stop reason: HOSPADM

## 2018-06-15 RX ORDER — GLYCOPYRROLATE 0.2 MG/ML
0.2 INJECTION INTRAMUSCULAR; INTRAVENOUS
Status: DISCONTINUED | OUTPATIENT
Start: 2018-06-15 | End: 2018-06-15 | Stop reason: HOSPADM

## 2018-06-15 RX ORDER — MORPHINE SULFATE 10 MG/ML
2 INJECTION, SOLUTION INTRAMUSCULAR; INTRAVENOUS
Status: DISCONTINUED | OUTPATIENT
Start: 2018-06-15 | End: 2018-06-15 | Stop reason: HOSPADM

## 2018-06-15 RX ORDER — MIDAZOLAM HYDROCHLORIDE 1 MG/ML
INJECTION, SOLUTION INTRAMUSCULAR; INTRAVENOUS AS NEEDED
Status: DISCONTINUED | OUTPATIENT
Start: 2018-06-15 | End: 2018-06-15 | Stop reason: HOSPADM

## 2018-06-15 RX ORDER — SODIUM CHLORIDE 9 MG/ML
25 INJECTION, SOLUTION INTRAVENOUS CONTINUOUS
Status: DISCONTINUED | OUTPATIENT
Start: 2018-06-15 | End: 2018-06-15 | Stop reason: HOSPADM

## 2018-06-15 RX ORDER — HYDROCODONE BITARTRATE AND ACETAMINOPHEN 5; 325 MG/1; MG/1
1 TABLET ORAL
Qty: 15 TAB | Refills: 0 | Status: SHIPPED | OUTPATIENT
Start: 2018-06-15 | End: 2018-07-17

## 2018-06-15 RX ORDER — SODIUM CHLORIDE, SODIUM LACTATE, POTASSIUM CHLORIDE, CALCIUM CHLORIDE 600; 310; 30; 20 MG/100ML; MG/100ML; MG/100ML; MG/100ML
1000 INJECTION, SOLUTION INTRAVENOUS CONTINUOUS
Status: DISCONTINUED | OUTPATIENT
Start: 2018-06-15 | End: 2018-06-15 | Stop reason: HOSPADM

## 2018-06-15 RX ORDER — VANCOMYCIN/0.9 % SOD CHLORIDE 1.5G/250ML
1500 PLASTIC BAG, INJECTION (ML) INTRAVENOUS ONCE
Status: COMPLETED | OUTPATIENT
Start: 2018-06-15 | End: 2018-06-15

## 2018-06-15 RX ORDER — PROPOFOL 10 MG/ML
INJECTION, EMULSION INTRAVENOUS
Status: DISCONTINUED | OUTPATIENT
Start: 2018-06-15 | End: 2018-06-15 | Stop reason: HOSPADM

## 2018-06-15 RX ORDER — PROPOFOL 10 MG/ML
INJECTION, EMULSION INTRAVENOUS AS NEEDED
Status: DISCONTINUED | OUTPATIENT
Start: 2018-06-15 | End: 2018-06-15 | Stop reason: HOSPADM

## 2018-06-15 RX ORDER — FENTANYL CITRATE 50 UG/ML
INJECTION, SOLUTION INTRAMUSCULAR; INTRAVENOUS AS NEEDED
Status: DISCONTINUED | OUTPATIENT
Start: 2018-06-15 | End: 2018-06-15 | Stop reason: HOSPADM

## 2018-06-15 RX ORDER — LIDOCAINE HYDROCHLORIDE 10 MG/ML
0.1 INJECTION, SOLUTION EPIDURAL; INFILTRATION; INTRACAUDAL; PERINEURAL AS NEEDED
Status: DISCONTINUED | OUTPATIENT
Start: 2018-06-15 | End: 2018-06-15 | Stop reason: HOSPADM

## 2018-06-15 RX ORDER — EPHEDRINE SULFATE 50 MG/ML
5 INJECTION, SOLUTION INTRAVENOUS AS NEEDED
Status: DISCONTINUED | OUTPATIENT
Start: 2018-06-15 | End: 2018-06-15 | Stop reason: HOSPADM

## 2018-06-15 RX ORDER — FENTANYL CITRATE 50 UG/ML
50 INJECTION, SOLUTION INTRAMUSCULAR; INTRAVENOUS AS NEEDED
Status: DISCONTINUED | OUTPATIENT
Start: 2018-06-15 | End: 2018-06-15 | Stop reason: HOSPADM

## 2018-06-15 RX ORDER — SODIUM CHLORIDE 0.9 % (FLUSH) 0.9 %
5-10 SYRINGE (ML) INJECTION EVERY 8 HOURS
Status: DISCONTINUED | OUTPATIENT
Start: 2018-06-15 | End: 2018-06-15 | Stop reason: HOSPADM

## 2018-06-15 RX ORDER — FENTANYL CITRATE 50 UG/ML
25 INJECTION, SOLUTION INTRAMUSCULAR; INTRAVENOUS
Status: DISCONTINUED | OUTPATIENT
Start: 2018-06-15 | End: 2018-06-15 | Stop reason: HOSPADM

## 2018-06-15 RX ORDER — ONDANSETRON 2 MG/ML
INJECTION INTRAMUSCULAR; INTRAVENOUS AS NEEDED
Status: DISCONTINUED | OUTPATIENT
Start: 2018-06-15 | End: 2018-06-15 | Stop reason: HOSPADM

## 2018-06-15 RX ORDER — DIPHENHYDRAMINE HYDROCHLORIDE 50 MG/ML
12.5 INJECTION, SOLUTION INTRAMUSCULAR; INTRAVENOUS AS NEEDED
Status: DISCONTINUED | OUTPATIENT
Start: 2018-06-15 | End: 2018-06-15 | Stop reason: HOSPADM

## 2018-06-15 RX ORDER — BUPIVACAINE HYDROCHLORIDE AND EPINEPHRINE 2.5; 5 MG/ML; UG/ML
INJECTION, SOLUTION EPIDURAL; INFILTRATION; INTRACAUDAL; PERINEURAL AS NEEDED
Status: DISCONTINUED | OUTPATIENT
Start: 2018-06-15 | End: 2018-06-15 | Stop reason: HOSPADM

## 2018-06-15 RX ORDER — ONDANSETRON 2 MG/ML
4 INJECTION INTRAMUSCULAR; INTRAVENOUS AS NEEDED
Status: DISCONTINUED | OUTPATIENT
Start: 2018-06-15 | End: 2018-06-15 | Stop reason: HOSPADM

## 2018-06-15 RX ORDER — SODIUM CHLORIDE 0.9 % (FLUSH) 0.9 %
5-10 SYRINGE (ML) INJECTION AS NEEDED
Status: DISCONTINUED | OUTPATIENT
Start: 2018-06-15 | End: 2018-06-15 | Stop reason: HOSPADM

## 2018-06-15 RX ORDER — ALBUTEROL SULFATE 0.83 MG/ML
2.5 SOLUTION RESPIRATORY (INHALATION) AS NEEDED
Status: DISCONTINUED | OUTPATIENT
Start: 2018-06-15 | End: 2018-06-15 | Stop reason: HOSPADM

## 2018-06-15 RX ORDER — MIDAZOLAM HYDROCHLORIDE 1 MG/ML
0.5 INJECTION, SOLUTION INTRAMUSCULAR; INTRAVENOUS
Status: DISCONTINUED | OUTPATIENT
Start: 2018-06-15 | End: 2018-06-15 | Stop reason: HOSPADM

## 2018-06-15 RX ADMIN — SODIUM CHLORIDE, SODIUM LACTATE, POTASSIUM CHLORIDE, AND CALCIUM CHLORIDE 1000 ML: 600; 310; 30; 20 INJECTION, SOLUTION INTRAVENOUS at 09:39

## 2018-06-15 RX ADMIN — PROPOFOL 20 MG: 10 INJECTION, EMULSION INTRAVENOUS at 10:11

## 2018-06-15 RX ADMIN — ONDANSETRON 4 MG: 2 INJECTION INTRAMUSCULAR; INTRAVENOUS at 10:41

## 2018-06-15 RX ADMIN — FENTANYL CITRATE 25 MCG: 50 INJECTION, SOLUTION INTRAMUSCULAR; INTRAVENOUS at 10:24

## 2018-06-15 RX ADMIN — MIDAZOLAM HYDROCHLORIDE 2 MG: 1 INJECTION, SOLUTION INTRAMUSCULAR; INTRAVENOUS at 10:05

## 2018-06-15 RX ADMIN — PROPOFOL 75 MCG/KG/MIN: 10 INJECTION, EMULSION INTRAVENOUS at 10:11

## 2018-06-15 RX ADMIN — VANCOMYCIN HYDROCHLORIDE 1500 MG: 10 INJECTION, POWDER, LYOPHILIZED, FOR SOLUTION INTRAVENOUS at 09:43

## 2018-06-15 NOTE — H&P
[employer] General Surgery History and Physical    History of Present Illness:      Manny Morales is a 65 y.o. 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 He has now completed adjuvant chemotherapy and is EUSEBIO. 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 His symptoms are improved after this. He feels well today. He presents for port removal.      Past Medical History:   Diagnosis Date    Arrhythmia     Previous a.fib, ablation, NSR now; NO LONGER FOLLOWED BY CARDIOLOGIST.     Autoimmune disease ([facility] [address]     SJOGREN'S    Cancer ([facility] [address]     COMMON BILE DUCT, ADENOCARCINOMA    Hypertension     Sepsis ([facility] [address] 2017    STENTING TO BILE DUCT    Status post chemotherapy        Past Surgical History:   Procedure Laterality Date    HX GI      COLONOSCOPY, POLYPS (BENIGN)    HX GI  10/06/2017    Whipple Dr. Buddy Fair [facility]     [address]  2005    Ablation     HX ORTHOPAEDIC  2000    Spinal fusion W/ HARDWARE    HX OTHER SURGICAL  11/07/2017    [place] Cath, Dr. Marsha Reina  2017    PORTACATH    NEUROLOGICAL PROCEDURE UNLISTED  2005    Elfida Bowling hole washout from cerebral hemorrhage         Current Facility-Administered Medications:     sodium chloride (NS) flush 5-10 mL, 5-10 mL, IntraVENous, Q8H, Clemencia East MD    sodium chloride (NS) flush 5-10 mL, 5-10 mL, IntraVENous, PRN, Clemencia East MD    vancomycin (VANCOCIN) 1500 mg in  ml infusion, 1,500 mg, IntraVENous, ONCE, Clemencia East MD    lactated Ringers infusion 1,000 mL, 1,000 mL, IntraVENous, CONTINUOUS, Alena Field MD    0.9% sodium chloride infusion, 25 mL/hr, IntraVENous, CONTINUOUS, Alena Field MD    lidocaine (PF) (XYLOCAINE) 10 mg/mL (1 %) injection 0.1 mL, 0.1 mL, SubCUTAneous, PRN, Alena Field MD    fentaNYL citrate (PF) injection 50 mcg, 50 mcg, IntraVENous, PRN, MD Anayeli Magdaleno midazolam (VERSED) injection 1 mg, 1 mg, IntraVENous, PRN, Mckenna Tan MD    midazolam (VERSED) injection 1 mg, 1 mg, IntraVENous, PRN, Mckenna Tan MD    glycopyrrolate (ROBINUL) injection 0.2 mg, 0.2 mg, IntraVENous, ONCE PRN, Mckenna Tan MD    ropivacaine (PF) (NAROPIN) 5 mg/mL (0.5 %) injection 150 mg, 30 mL, Peripheral Nerve Block, PRN, Mckenna Tan MD    Allergies   Allergen Reactions    Shellfish Derived Anaphylaxis     Soft shell crabs    Pcn [Penicillins] Other (comments)     Pt stated \"always been told PCN\"       Social History     Social History    Marital status:      Spouse name: N/A    Number of children: N/A    Years of education: N/A     Occupational History    Not on file. Social History Main Topics    Smoking status: Never Smoker    Smokeless tobacco: Never Used    Alcohol use Yes      Comment: RARELY    Drug use: No    Sexual activity: Yes     Partners: Female     Other Topics Concern    Not on file     Social History Narrative       Family History   Problem Relation Age of Onset    Cancer Father      Breast and Colon    Cancer Mother      LEUKEMIA    No Known Problems Sister     No Known Problems Brother     No Known Problems Sister     Anesth Problems Neg Hx        ROS   Constitutional: negative  Ears, Nose, Mouth, Throat, and Face: negative  Respiratory: negative  Cardiovascular: negative  Gastrointestinal: Had issues with emesis that are improved after EGD with dilation. Genitourinary:negative  Integument/Breast: negative  Hematologic/Lymphatic: negative  Behavioral/Psychiatric: negative  Allergic/Immunologic: negative      Physical Exam:     Visit Vitals    /77 (BP 1 Location: Right arm, BP Patient Position: At rest)    Pulse 68    Temp 98.2 °F (36.8 °C)    Resp 16    Ht 5' 8\" (1.727 m)    Wt 185 lb (83.9 kg)    SpO2 97%    BMI 28.13 kg/m2       General - alert and oriented, no apparent distress  HEENT - NC/AT.   No scleral icterus  Pulm - CTAB, normal inspiratory effort. Port in place without signs of infection. CV - RRR, no M/R/G  Abd - soft, NT, ND. Incision well healed. No evidence of hernia. Ext - warm, well perfused, no edema  Lymphatics - No cervical or supraclavicular adenopathy. Skin - supple, no rashes  Psychiatric - normal affect, good mood      Assessment:     Roseanne Scott is a 65 y.o. male with [id number] distal cholangiocarcinoma s/p whipple procedure 10/6/17. He has now completed adjuvant chemotherapy and is EUSEBIO. He presents for port removal.     Recommendations:     1. Removal of port today. A complete discussion of the risks, benefits and alternatives to surgery were discussed with the patient who was keen to proceed.        Rick Moya MD  6/15/2018  9:41 AM

## 2018-06-15 NOTE — ANESTHESIA POSTPROCEDURE EVALUATION
Post-Anesthesia Evaluation and Assessment    Patient: Myah Baez MRN: [id number]  SSN: [id number]    YOB: 1955  Age: 65 y.o. Sex: male       Cardiovascular Function/Vital Signs  Visit Vitals    /74    Pulse 60    Temp 36.4 °C (97.6 °F)    Resp 14    Ht 5' 8\" (1.727 m)    Wt 83.9 kg (185 lb)    SpO2 96%    BMI 28.13 kg/m2       Patient is status post MAC anesthesia for Procedure(s):  PORT A CATH REMOVAL. Nausea/Vomiting: None    Postoperative hydration reviewed and adequate. Pain:  Pain Scale 1: Numeric (0 - 10) (06/15/18 1151)  Pain Intensity 1: 0 (06/15/18 1151)   Managed    Neurological Status:   Neuro (WDL): Within Defined Limits (06/15/18 1200)  Neuro  Neurologic State: Drowsy (06/15/18 1105)   At baseline    Mental Status and Level of Consciousness: Arousable    Pulmonary Status:   O2 Device: Nasal cannula (06/15/18 1052)   Adequate oxygenation and airway patent    Complications related to anesthesia: None    Post-anesthesia assessment completed.  No concerns    Signed By: Karen Lomas MD     Jolly 15, 2018

## 2018-06-15 NOTE — IP AVS SNAPSHOT
[address] [facility] [address] 
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 
[phone] Your Scheduled Appointments Friday June 22, 2018  2:00 PM EDT INFUSION 15 RI with CHAIR 2 NIECYBALTAZAR Hern[address] ([place] [address]) [address]  
[phone] Go to [facility], [facility], [address] Thursday July 12, 2018  9:00 AM EDT New Patient with Zonia Caicedo [facility]) [address] [place] [address] [address] [place] [address]  
[phone] Wednesday July 18, 2018 ENDOSCOPIC ULTRASOUND (EUS) with Andria Vega MD  
[facility] ENDOSCOPY (RI OR PRE ASSESSMENT) [address] [place] [address] [address]  
[phone] OP Registration: DAVID Puente 78 Telephone: [phone] Fax: [phone] ** Pt will need to arrive 30 min prior unless appointment prep instructions indicate otherwise** **** Send All ENDO pt to the MAIN Admitting Department, off the hospitals main lobby at [facility]. ****  
  
    
OP Registration: DAVID Cegal- [address] [facility] [address] [facility] [address] Telephone: [phone] Fax: [phone] ** Pt will need to arrive 30 min prior unless appointment prep instructions indicate otherwise** **** Send All ENDO pt to the MAIN Admitting Department, off the hospitals main lobby at [facility]. **** Friday July 20, 2018  2:00 PM EDT INFUSION 15 RI with CHAIR 2 CHONG Chatman [address] ([place] [address]) [address]  
[phone] Go to [facility], [facility], [address] [facility], [facility] [address] [facility] Discharge Orders None A check geremias indicates which time of day the medication should be taken. My Medications START taking these medications Instructions Each Dose to Equal  
 Morning Noon Evening Bedtime HYDROcodone-acetaminophen 5-325 mg per tablet Commonly known as:  Inga Fitzgerald Your last dose was: Your next dose is: Take 1 Tab by mouth every four (4) hours as needed for Pain. Max Daily Amount: 6 Tabs. 1 Tab CONTINUE taking these medications Instructions Each Dose to Equal  
 Morning Noon Evening Bedtime  
 cyanocobalamin 1,000 mcg/mL injection Commonly known as:  VITAMIN B12 Your last dose was: Your next dose is:    
   
   
 1,000 mcg by IntraMUSCular route Twice a month. 1000 mcg  
    
   
   
   
  
 gabapentin 300 mg capsule Commonly known as:  NEURONTIN Your last dose was: Your next dose is: Take 3 Caps by mouth two (2) times a day. 3 x 300mg caps (900mg) twice daily 900 mg  
    
   
   
   
  
 pantoprazole 40 mg tablet Commonly known as:  PROTONIX Your last dose was: Your next dose is: TAKE 1 BY MOUTH EVERY MORNING FOR 30 DAYS  
     
   
   
   
  
 ramipril 10 mg capsule Commonly known as:  ALTACE Your last dose was: Your next dose is: Take 1 Cap by mouth nightly. 10 mg  
    
   
   
   
  
 VITAMIN D3 1,000 unit Cap Generic drug:  cholecalciferol Your last dose was: Your next dose is: Take 5,000 Units by mouth daily. 5000 Units ZyrTEC 10 mg tablet Generic drug:  cetirizine Your last dose was: Your next dose is: Take  by mouth nightly. Indications: AUTOIMMUNE DISORDER, SJOGRENS. Where to Get Your Medications Information on where to get these meds will be given to you by the nurse or doctor. ! Ask your nurse or doctor about these medications HYDROcodone-acetaminophen 5-325 mg per tablet Opioid Education Prescription Opioids: What You Need to Know: 
 
Prescription opioids can be used to help relieve moderate-to-severe pain and are often prescribed following a surgery or injury, or for certain health conditions. These medications can be an important part of treatment but also come with serious risks. Opioids are strong pain medicines. Examples include hydrocodone, oxycodone, fentanyl, and morphine. Heroin is an example of an illegal opioid. It is important to work with your health care provider to make sure you are getting the safest, most effective care. WHAT ARE THE RISKS AND SIDE EFFECTS OF OPIOID USE? Prescription opioids carry serious risks of addiction and overdose, especially with prolonged use. An opioid overdose, often marked by slow breathing, can cause sudden death. The use of prescription opioids can have a number of side effects as well, even when taken as directed. · Tolerance-meaning you might need to take more of a medication for the same pain relief · Physical dependence-meaning you have symptoms of withdrawal when the medication is stopped. Withdrawal symptoms can include nausea, sweating, chills, diarrhea, stomach cramps, and muscle aches. Withdrawal can last up to several weeks, depending on which drug you took and how long you took it. · Increased sensitivity to pain · Constipation · Nausea, vomiting, and dry mouth · Sleepiness and dizziness · Confusion · Depression · Low levels of testosterone that can result in lower sex drive, energy, and strength · Itching and sweating RISKS ARE GREATER WITH:      
· History of drug misuse, substance use disorder, or overdose · Mental health conditions (such as depression or anxiety) · Sleep apnea · Older age (72 years or older) · Pregnancy Avoid alcohol while taking prescription opioids. Also, unless specifically advised by your health care provider, medications to avoid include: · Benzodiazepines (such as Xanax or Valium) · Muscle relaxants (such as Soma or Flexeril) · Hypnotics (such as Ambien or Lunesta) · Other prescription opioids KNOW YOUR OPTIONS Talk to your health care provider about ways to manage your pain that don't involve prescription opioids. Some of these options may actually work better and have fewer risks and side effects. Options may include: 
· Pain relievers such as acetaminophen, ibuprofen, and naproxen · Some medications that are also used for depression or seizures · Physical therapy and exercise · Counseling to help patients learn how to cope better with triggers of pain and stress. · Application of heat or cold compress · Massage therapy · Relaxation techniques Be Informed Make sure you know the name of your medication, how much and how often to take it, and its potential risks & side effects. IF YOU ARE PRESCRIBED OPIOIDS FOR PAIN: 
· Never take opioids in greater amounts or more often than prescribed. Remember the goal is not to be pain-free but to manage your pain at a tolerable level. · Follow up with your primary care provider to: · Work together to create a plan on how to manage your pain. · Talk about ways to help manage your pain that don't involve prescription opioids. · Talk about any and all concerns and side effects. · Help prevent misuse and abuse. · Never sell or share prescription opioids · Help prevent misuse and abuse. · Store prescription opioids in a secure place and out of reach of others (this may include visitors, children, friends, and family). · Safely dispose of unused/unwanted prescription opioids: Find your community drug take-back program or your pharmacy mail-back program, or flush them down the toilet, following guidance from the Food and Drug Administration (www.fda.gov/Drugs/ResourcesForYou). · Visit www.cdc.gov/drugoverdose to learn about the risks of opioid abuse and overdose. · If you believe you may be struggling with addiction, tell your health care provider and ask for guidance or call [address] [place], [place] [address] at 3-360-038-JPRJ. Discharge Instructions Patient Discharge Instructions Marlene Christopher / [id number] : 1955 Admitted 6/15/2018 Discharged: 6/15/2018 · It is important that you take the medication exactly as they are prescribed. · Keep your medication in the bottles provided by the pharmacist and keep a list of the medication names, dosages, and times to be taken in your wallet. · Do not take other medications without consulting your doctor. What to do at [facility] Recommended diet: Resume previous diet Recommended activity: No strenuous activity with right arm for 1-2 days to allow incisions to heal.   
 
No Driving While Taking narcotic pain medications. May Take Shower 1 day after surgery. Do not submerge your incision under water (pool, bath, hot tub) for 7 days after surgery. If you experience any of the following symptoms Fevers, Chills, Nausea, Vomitting, Redness or Drainage at Surgical Site(s) or Any Other Questions or Concerns Please Call -  [phone]. Follow-up with  [facility] in ~ 14 days. Information obtained by : 
I understand that if any problems occur once I am at home I am to contact my physician. I understand and acknowledge receipt of the instructions indicated above. Physician's or R.N.'s Signature                                                                  Date/Time Patient or Representative Signature                                                          Date/Time 
 
 
______________________________________________________________________ Anesthesia Discharge Instructions After general anesthesia or intervenous sedation, for 24 hours or while taking prescription Narcotics: · Limit your activities · Do not drive or operate hazardous machinery · If you have not urinated within 8 hours after discharge, please contact your surgeon on call. · Do not make important personal or business decisions · Do not drink alcoholic beverages Report the following to your surgeon: 
· Excessive pain, swelling, redness or odor of or around the surgical area · Temperature over 100.5 degrees · Nausea and vomiting lasting longer than 4 hours or if unable to take medication · Any signs of decreased circulation or nerve impairment to extremity:  Change in color, persistent numbness, tingling, coldness or increased pain. · Any questions Introducing [facility]! Dear Genet Bolden: Thank you for requesting a Fit with Friends account. Our records indicate that you already have an active Fit with Friends account. You can access your account anytime at [email or web address] Did you know that you can access your hospital and ER discharge instructions at any time in Fit with Friends? You can also review all of your test results from your hospital stay or ER visit. Additional Information If you have questions, please visit the Frequently Asked Questions section of the MyChart website at [email or web address] Remember, The Library is NOT to be used for urgent needs. For medical emergencies, dial 911. Now available from your iPhone and Android! Introducing Santana Marrufo As a [facility] patient, I wanted to make you aware of our electronic visit tool called Santana Marrufo. Events Core 24/7 allows you to connect within minutes with a medical provider 24 hours a day, seven days a week via a mobile device or tablet or logging into a secure website from your computer. You can access Santana Marrufo from anywhere in the [place]. A virtual visit might be right for you when you have a simple condition and feel like you just dont want to get out of bed, or cant get away from work for an appointment, when your regular [facility] provider is not available (evenings, weekends or holidays), or when youre out of town and need minor care. Electronic visits cost only $49 and if the [facility] Wellcentive/Cswitch provider determines a prescription is needed to treat your condition, one can be electronically transmitted to a nearby pharmacy*. Please take a moment to enroll today if you have not already done so. The enrollment process is free and takes just a few minutes. To enroll, please download the Endorse/Cswitch ciara to your tablet or phone, or visit [email or web address] to enroll on your computer. And, as an [address] [place] [address] patient with a Digital Lab account, the results of your visits will be scanned into your electronic medical record and your primary care provider will be able to view the scanned results. We urge you to continue to see your regular [facility] provider for your ongoing medical care.   And while your primary care provider may not be the one available when you seek a Santana Marrufo virtual visit, the peace of mind you get from getting a real diagnosis real time can be priceless. For more information on Santana Marrufo, view our Frequently Asked Questions (FAQs) at [email or web address]. Sincerely, 
 
Ramiro Velásquez MD 
Chief Medical Officer [employer] *:  certain medications cannot be prescribed via Santana Marrufo Providers Seen During Your Hospitalization Provider Specialty Primary office phone Rosi King, Lisa7 [facility] Surgery [phone] Your Primary Care Physician (PCP) Primary Care Physician Office Phone Office Fax Lilia Hermosillo B [phone] You are allergic to the following Allergen Reactions Shellfish Derived Anaphylaxis Soft shell crabs Pcn (Penicillins) Other (comments) Pt stated \"always been told PCN\" Recent Documentation Height Weight BMI Smoking Status 1.727 m 83.9 kg 28.13 kg/m2 Never Smoker Emergency Contacts Name Discharge Info Relation Home Work Mobile AkhilPrachi brannoni DISCHARGE CAREGIVER [3] Spouse [3] [phone] Roberto Cristina DISCHARGE CAREGIVER [3] Child [2] [phone] JonnieRidge III DISCHARGE CAREGIVER [3] Child [2] [phone] JonnieChristelle  Spouse [3] [phone] Patient Belongings The following personal items are in your possession at time of discharge: 
  Dental Appliances: None  Visual Aid: Glasses             Clothing: Other (comment) (clothing) Discharge Instructions Attachments/References MEFS - HYDROCODONE/ACETAMINOPHEN (VICODIN, Verlee Shack, LORTAB) - (BY MOUTH) (ENGLISH) Patient Handouts Hydrocodone/Acetaminophen (Vicodin, Norco, Lortab) - (By mouth) Why this medicine is used:  
Treats pain. Contact a nurse or doctor right away if you have: · Blistering, peeling, red skin rash · Fast or slow heartbeat, shallow breathing, blue lips, fingernails, or skin · Anxiety, restlessness, muscle spasms, twitching, seeing or hearing things that are not there · Dark urine or pale stools, yellow skin or eyes · Extreme weakness, sweating, seizures, cold or clammy skin · Lightheadedness, dizziness, fainting, fever, sweating Common side effects: 
· Constipation, nausea, vomiting, loss of appetite, stomach pain · Tiredness or sleepiness © 2017 [facility] Information is for End User's use only and may not be sold, redistributed or otherwise used for commercial purposes. Please provide this summary of care documentation to your next provider. Signatures-by signing, you are acknowledging that this After Visit Summary has been reviewed with you and you have received a copy. Patient Signature:  ____________________________________________________________ Date:  ____________________________________________________________  
  
Chrissy Souza Provider Signature:  ____________________________________________________________ Date:  ____________________________________________________________

## 2018-06-15 NOTE — BRIEF OP NOTE
BRIEF OPERATIVE NOTE    Date of Procedure: 6/15/2018   Preoperative Diagnosis: CHOLANGIOCARCINOMA  Postoperative Diagnosis: CHOLANGIOCARCINOMA    Procedure(s):  PORT A CATH REMOVAL  Surgeon(s) and Role:     * Nevaeh Chowdhury MD - Primary         Surgical Assistant: None    Surgical Staff:  Circ-1: Neva Quezada RN  Circ-2: Edelmira Childers RN  Scrub Tech-1: Mei Treadwell  Event Time In   Incision Start 1025   Incision Close 1041     Anesthesia: MAC   Estimated Blood Loss: 1 mL  Specimens: port-a-cath removed intact. Findings: Port-a-cath removed intact.      Complications: None  Implants: * No implants in log *

## 2018-06-15 NOTE — PERIOP NOTES
36 - Patient's family member (wife) called and updated on patient progression and start of procedure. 1,000 mL of 0.9% Normal Saline added to sterile field for PRN irrigation throughout procedure.

## 2018-06-15 NOTE — OP NOTES
[address] [place] [address] REPORT    Tom Leon  MR#: [id number]  : 1955  ACCOUNT #: [de-identified]   DATE OF SERVICE: 06/15/2018    PREOPERATIVE DIAGNOSIS:  Cholangiocarcinoma. POSTOPERATIVE DIAGNOSIS:  Cholangiocarcinoma. PROCEDURE PERFORMED:  Removal of Port-A-Cath. SURGEON:  Dustin Burch MD    ASSISTANT:  None. ANESTHESIA:  Anesthesia monitored care. ESTIMATED BLOOD LOSS:  1 mL. FINDINGS:  The patient's previously placed right IJ Port-A-Cath was removed intact. 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 He is status post a Whipple and has now completed adjuvant chemotherapy. He has no evidence of disease. He presents today for removal of this Port-A-Cath. A complete discussion of risks, benefits and alternatives of surgery were had with the patient. He was in agreement to proceed. Informed consent was obtained. DESCRIPTION OF THE OPERATION:  After informed consent was obtained, the patient was brought back to the operating room. He was prepped and draped in the usual sterile fashion. He was placed under anesthesia monitored care. Once he was prepped and draped, a proper time-out was performed. With this completed, we injected local anesthetic into the skin and subcutaneous tissue around his right IJ Port-A-Cath. The port was on the right upper chest below the clavicle. Once this area was adequately anesthetized, I then opened the previous incision using a 15 blade scalpel. We dissected down using electrocautery and opened the tissues around the port. The 3 Prolene sutures that had been used to secure the port in place were all cut and removed. The port was then freed and removed from the pocket beneath the skin. Holding pressure at the level of the insertion and the base of the right neck, I then pulled the catheter.   This came out easily and the catheter was inspected. It was noted to be completely intact. I also placed a pursestring suture prior to pulling the catheter around the catheter tract within the incision in the right upper chest.  This was tied down and this was a 3-0 Vicryl suture. There was no evidence of bleeding or hematoma. At this time, I used electrocautery to excise the capsule that had developed around the port site. This was nicely hemostatic. I then irrigated the cavity. The cavity was then closed in layers, first using 3-0 Vicryl interrupted sutures followed by a running 4-0 Monocryl subcuticular stitch. The skin was then covered with a Dermabond skin adhesive. The patient was then awakened and taken to the postanesthesia care unit in stable condition. All needle, instrument counts were correct at completion of the case. I was present and scrubbed throughout the entirety of the case. COMPLICATIONS:  There were no immediate complications. SPECIMENS REMOVED:   Port catheter was removed and intact. COMPLICATIONS:  None. IMPLANTS:  None. DISPOSITION:  PACU.       MD ARNOLD Tracy / BEBO  D: 06/15/2018 10:55     T: 06/15/2018 14:17  JOB #: [id number]

## 2018-06-15 NOTE — ANESTHESIA PREPROCEDURE EVALUATION
Anesthetic History   No history of anesthetic complications            Review of Systems / Medical History  Patient summary reviewed, nursing notes reviewed and pertinent labs reviewed    Pulmonary  Within defined limits                 Neuro/Psych   Within defined limits           Cardiovascular    Hypertension        Dysrhythmias : atrial fibrillation        Comments: S/p afib ablation   GI/Hepatic/Renal  Within defined limits              Endo/Other  Within defined limits           Other Findings              Physical Exam    Airway  Mallampati: II  TM Distance: > 6 cm  Neck ROM: normal range of motion   Mouth opening: Normal     Cardiovascular  Regular rate and rhythm,  S1 and S2 normal,  no murmur, click, rub, or gallop             Dental  No notable dental hx       Pulmonary  Breath sounds clear to auscultation               Abdominal  GI exam deferred       Other Findings            Anesthetic Plan    ASA: 3  Anesthesia type: MAC          Induction: Intravenous  Anesthetic plan and risks discussed with: Patient

## 2018-06-15 NOTE — PROGRESS NOTES
Documentation only:      Noted patient in for Port-a-cath removal.   No call deemed necessary. FU call planned 6/28.

## 2018-06-15 NOTE — DISCHARGE INSTRUCTIONS
Patient Discharge Instructions    Vero Du / [id number] : 1955    Admitted 6/15/2018 Discharged: 6/15/2018       · It is important that you take the medication exactly as they are prescribed. · Keep your medication in the bottles provided by the pharmacist and keep a list of the medication names, dosages, and times to be taken in your wallet. · Do not take other medications without consulting your doctor. What to do at Home    Recommended diet: Resume previous diet    Recommended activity: No strenuous activity with right arm for 1-2 days to allow incisions to heal.      No Driving While Taking narcotic pain medications. May Take Shower 1 day after surgery. Do not submerge your incision under water (pool, bath, hot tub) for 7 days after surgery. If you experience any of the following symptoms Fevers, Chills, Nausea, Vomitting, Redness or Drainage at Surgical Site(s) or Any Other Questions or Concerns Please Call -  [phone]. Follow-up with Dr. Jerry Valle in ~ 14 days. Information obtained by :  I understand that if any problems occur once I am at home I am to contact my physician. I understand and acknowledge receipt of the instructions indicated above.                                                                                                                                            Physician's or R.N.'s Signature                                                                  Date/Time                                                                                                                                              Patient or Representative Signature                                                          Date/Time      ______________________________________________________________________    Anesthesia Discharge Instructions    After general anesthesia or intervenous sedation, for 24 hours or while taking prescription Narcotics:  · Limit your activities  · Do not drive or operate hazardous machinery  · If you have not urinated within 8 hours after discharge, please contact your surgeon on call. · Do not make important personal or business decisions  · Do not drink alcoholic beverages    Report the following to your surgeon:  · Excessive pain, swelling, redness or odor of or around the surgical area  · Temperature over 100.5 degrees  · Nausea and vomiting lasting longer than 4 hours or if unable to take medication  · Any signs of decreased circulation or nerve impairment to extremity:  Change in color, persistent numbness, tingling, coldness or increased pain.   · Any questions

## 2018-06-22 ENCOUNTER — APPOINTMENT (OUTPATIENT)
Dept: INFUSION THERAPY | Age: 63
End: 2018-06-22

## 2018-06-28 ENCOUNTER — PATIENT OUTREACH (OUTPATIENT)
Dept: OTHER | Age: 63
End: 2018-06-28

## 2018-06-28 NOTE — PROGRESS NOTES
CCM progress note    Called Mr. Cristina for CCM FU. Verified  and address for HIPAA security. Changes since last call include:     Med changes:  None. Doctors appointments :  Next UGI with dilation planned     * Mr. Nanette Summers is not having a good day. N/V is bad. * He has a repeat dilatation planned . Hopes for relief. * He would like to cut this call short. Asked that I call again after his UGI.    - I offered to send a form to allow me to speak to his wife, Chintan Mclain when he feels bad. His is accepting of this. She is not with him at the moment/ but will be back within the hour. * Encouraged him to call me if he needs me to call doctors for antinausea meds. Check in for the patients goals:    1) Goal    :  Oncology support. A) Progress -   Chemo is finished/  Port removed. B) Barriers addressed-  Now pre-diabetic; And N/V complicates his abilities to interact. C) Utilizing strategy  -- NN visit for pre-diabetes; Sending form for speaking to his wife, who is nursing instructor for [employer]. D) Plan for next check in   -  Call  after UGI/ dilitation. Advanced Directive:  Not addressed on this call. Patient's current stage of activation:        Action-  Ongoing support;  Reinforcement of change attempts; Expect set-backs;  Rolling with resistance; OARS  Maintenance - Developing a plan for maintenance and relapse prevention. Patient supportive materials mail - Sending HIPAA release form to SW his wife. Patient verbalized understanding of all information discussed. Pt has my contact information for any further questions, concerns, or needs.     Plan for next call: 3 weeks

## 2018-07-03 ENCOUNTER — OFFICE VISIT (OUTPATIENT)
Dept: SURGERY | Age: 63
End: 2018-07-03

## 2018-07-03 VITALS
HEIGHT: 68 IN | BODY MASS INDEX: 28.04 KG/M2 | DIASTOLIC BLOOD PRESSURE: 68 MMHG | WEIGHT: 185 LBS | RESPIRATION RATE: 18 BRPM | HEART RATE: 82 BPM | SYSTOLIC BLOOD PRESSURE: 122 MMHG | OXYGEN SATURATION: 98 % | TEMPERATURE: 97.9 F

## 2018-07-03 DIAGNOSIS — R11.11 VOMITING WITHOUT NAUSEA, INTRACTABILITY OF VOMITING NOT SPECIFIED, UNSPECIFIED VOMITING TYPE: Primary | ICD-10-CM

## 2018-07-03 DIAGNOSIS — C22.1 CHOLANGIOCARCINOMA (HCC): ICD-10-CM

## 2018-07-03 RX ORDER — ONDANSETRON 8 MG/1
8 TABLET, ORALLY DISINTEGRATING ORAL
COMMUNITY
End: 2019-01-14 | Stop reason: SDUPTHER

## 2018-07-03 NOTE — MR AVS SNAPSHOT
[address] [facility] [address] [id number] 
[phone] Patient: Zahida Sen MRN: [id number] [facility]:9/80/9426 Visit Information Date & Time Provider Department Dept. Phone Encounter #  
 7/3/2018  2:45 PM MD Rafy Diazmücarolgrabielalyssa 137 [phone] [id number] Follow-up Instructions Return if symptoms worsen or fail to improve. Routing History Your Appointments 7/12/2018  9:00 AM  
New Patient with Polly Carcamo [facility]) Appt Note: diabetes education with Mary Ellen-nutrition [address] [facility]  
[phone]  
  
   
 [address]  [address] [facility]  
  
    
 8/24/2018 11:30 AM  
ESTABLISHED PATIENT with Jeovany Sifuentes MD  
[address] [facility] Oncology at [facility]) Appt Note: onc 3 mth f/u  
 [address] [facility] [address] [facility]  
[phone]  
  
   
 [address] [facility] [address] [address]  
  
    
 9/7/2018  9:00 AM  
PHYSICAL PRE OP with Supriya Aguilar III, DO [facility]) Appt Note: 3 month follow up  
 [address] [address]  
[address]  [address] [facility] Upcoming Health Maintenance Date Due Influenza Age 5 to Adult 8/1/2018 Pneumococcal 19-64 Highest Risk (3 of 3 - PPSV23) 1/4/2022 DTaP/Tdap/Td series (2 - Td) 1/7/2023 COLONOSCOPY 6/7/2023 Allergies as of 7/3/2018  Review Complete On: 7/3/2018 By: Maria A Mtz LPN Severity Noted Reaction Type Reactions Shellfish Derived High 06/15/2018   Systemic Anaphylaxis Soft shell crabs Pcn [Penicillins]  07/23/2017    Other (comments) Pt stated \"always been told PCN\" Current Immunizations  Reviewed on 5/25/2018 Name Date Pneumococcal Conjugate (PCV-13) 10/26/2017 Pneumococcal Polysaccharide (PPSV-23) 1/4/2017 Not reviewed this visit You Were Diagnosed With   
  
 Codes Comments Vomiting without nausea, intractability of vomiting not specified, unspecified vomiting type    -  Primary ICD-10-CM: R11.11 ICD-9-CM: 787.03 [facility]     ICD-10-CM: C22.1 ICD-9-CM: 155.1 Vitals BP Pulse Temp Resp Height(growth percentile) Weight(growth percentile) 122/68 (BP 1 Location: Right arm, BP Patient Position: Sitting) 82 97.9 °F (36.6 °C) (Oral) 18 5' 8\" (1.727 m) 185 lb (83.9 kg) SpO2 BMI Smoking Status 98% 28.13 kg/m2 Never Smoker BMI and BSA Data Body Mass Index Body Surface Area  
 28.13 kg/m 2 2.01 m 2 Preferred Pharmacy Pharmacy [facility] Phone [facility] [address] [place] [address] [phone] Your Updated Medication List  
  
   
This list is accurate as of 7/3/18 11:59 PM.  Always use your most recent med list.  
  
  
  
  
 cyanocobalamin 1,000 mcg/mL injection Commonly known as:  VITAMIN B12  
1,000 mcg by IntraMUSCular route Twice a month.  
  
 gabapentin 300 mg capsule Commonly known as:  NEURONTIN Take 3 Caps by mouth two (2) times a day. 3 x 300mg caps (900mg) twice daily HYDROcodone-acetaminophen 5-325 mg per tablet Commonly known as:  Renea Fothergill Take 1 Tab by mouth every four (4) hours as needed for Pain. Max Daily Amount: 6 Tabs. pantoprazole 40 mg tablet Commonly known as:  PROTONIX  
TAKE 1 BY MOUTH EVERY MORNING FOR 30 DAYS  
  
 ramipril 10 mg capsule Commonly known as:  ALTACE Take 1 Cap by mouth nightly. VITAMIN D3 1,000 unit Cap Generic drug:  cholecalciferol Take 5,000 Units by mouth daily. ZOFRAN ODT 8 mg disintegrating tablet Generic drug:  ondansetron Take 8 mg by mouth every eight (8) hours as needed for Nausea. ZyrTEC 10 mg tablet Generic drug:  cetirizine Take  by mouth nightly. Indications: AUTOIMMUNE DISORDER, SJOGRENS. Follow-up Instructions Return if symptoms worsen or fail to improve. Introducing [facility] & HEALTH SERVICES! Dear Sumit Serrato: Thank you for requesting a [employer] account. Our records indicate that you already have an active [employer] account. You can access your account anytime at [email or web address] Did you know that you can access your hospital and ER discharge instructions at any time in [employer]? You can also review all of your test results from your hospital stay or ER visit. Additional Information If you have questions, please visit the Frequently Asked Questions section of the [employer] website at [email or web address]. Remember, [employer] is NOT to be used for urgent needs. For medical emergencies, dial 911. Now available from your iPhone and Android! Please provide this summary of care documentation to your next provider. Your primary care clinician is listed as Marc Chew If you have any questions after today's visit, please call [phone].

## 2018-07-03 NOTE — PROGRESS NOTES
1. Have you been to the ER, urgent care clinic since your last visit? Hospitalized since your last visit? No    2. Have you seen or consulted any other health care providers outside of the [address] [place] [address] since your last visit? Include any pap smears or colon screening.  No

## 2018-07-05 PROBLEM — R11.10 VOMITING: Status: ACTIVE | Noted: 2018-07-05

## 2018-07-06 NOTE — PROGRESS NOTES
[address] [facility] Surgery      Clinic Note - Follow up    Subjective     Talya Mcdaniel returns for scheduled follow up today. He is s/p port-a-cath removal on 6/15/18. 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 He has now completed adjuvant chemotherapy and is EUSEBIO. 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 He has done well since his port was removed. He states he has started vomiting again 2-3 times per week. It continues to be yellow liquid without solid food and without ongoing nausea. He is maintaining his weight and without other complaints. The vomiting has been refractory to all medical treatments. Objective     Visit Vitals    /68 (BP 1 Location: Right arm, BP Patient Position: Sitting)    Pulse 82    Temp 97.9 °F (36.6 °C) (Oral)    Resp 18    Ht 5' 8\" (1.727 m)    Wt 185 lb (83.9 kg)    SpO2 98%    BMI 28.13 kg/m2         PE  GEN - Awake, alert, communicating appropriately. NAD  Pulm - CTAB  Chest - port site incision c/d/i on right chest, no signs of infection. CV - RRR  Abd - soft, NT, ND. Incision well healed. No signs of hernia. Ext - warm, well perfused. Labs  None    Assessment     Talya Mcdaniel is a 65 y. o.yr old male s/p port-a-cath removal on 6/15/18. 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 He has now completed adjuvant chemotherapy and is EUSEBIO. 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 He continues to have issues with vomiting. Plan     I have discussed the situation with Dr. Natanael Parra (GI) who feels that repeat EGD and dilation may be of additional benefit.   I have offered the patient to revise him from a pylorus sparing duodenojejunostomy to a gastrojejunostomy if his vomiting persists. He is not interested in more abdominal surgery at this time and would like to attempt further endoscopic therapy. He will continue surveillance with Dr. Radha Gomez. He will call with any future concerns.       Beatriz Ramirez MD  7/3/18    CC: Jaime Kuhn

## 2018-07-12 ENCOUNTER — PATIENT OUTREACH (OUTPATIENT)
Dept: INTERNAL MEDICINE CLINIC | Age: 63
End: 2018-07-12

## 2018-07-12 ENCOUNTER — OFFICE VISIT (OUTPATIENT)
Dept: INTERNAL MEDICINE CLINIC | Age: 63
End: 2018-07-12

## 2018-07-12 VITALS — BODY MASS INDEX: 28.22 KG/M2 | WEIGHT: 185.6 LBS

## 2018-07-12 DIAGNOSIS — R73.03 PREDIABETES: Primary | ICD-10-CM

## 2018-07-12 RX ORDER — ASPIRIN 81 MG/1
81 TABLET ORAL DAILY
COMMUNITY
End: 2018-07-17

## 2018-07-12 NOTE — PROGRESS NOTES
Was asked by Dr. Ace Cook to see patient for being on the \"cusp of diabetes\". Last A1c was 6.5% on 6/7/18. Previous diabetes education - none.            Lab Results   Component Value Date/Time     Hemoglobin A1c 6.5 (H) 06/07/2018 11:31 AM     Hemoglobin A1c 6.0 (H) 10/26/2017 12:22 PM      Presentation/Accompanied by - self ambulatory     History/Family History - pt had whipple procedure last October for Cholangiocarcinoma. He still vomits bile ~ twice a week and is having EGD and dilation later this month.  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 and likes to eat; not a sweets eater; has started reading labels, using Splenda in his coffee and not eating honey buns for breakfast; his wife is on a meal plan to lose weight and is supportive in healthier eating  BK- 1/2 oatmeal with tbsp of peanut butter, 3-4 cups of coffee w/Splenda; on Sunday eggs and munson or sausage, no carb  LN-2 sandwiches-either hamburgers, hot dogs, ham, and sometimes adds left over [employer] baked beans; drinks water; or 12 inch sub sandwich or Whopper  DN- hearty portion of baked, grilled or broiked beef, pork, chicken or fish, gravy, white or sweet potatoes, snaps or corn or baked beans or tossed salad with non starch veggies, cheese and Western Margie drsg  SN- does not snack except drinks small G2 or low calorie gatorade at bedtime that helps with nocturnal leg cramps        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        5) Problem Solving- pt is already eating and drinking less carbs      6) Reducing Risk-   Tobacco -has never smoked  HTN - takes ramipril  HLD - not taking statin  Aspirin - takes low dose aspirin     7) Healthy Coping-   Support system - wife is supportive and is a nurse; pt is taking advantage of support of diabetes health care team and education provided this visit     Resources-   Living With Type 2 Diabetes: Where Do I Begin? with focus on what is a carb and healthy plate meal plan     Diabetes Made Simple video     Shared decision making plan with goal to lose 15#:  1) follow healthy meal plan by cutting back on carbohydrates (up to 60 grams per meal) and being mindful of portion control  2) start taking a brisk walk for 15 minutes in the morning most days of the week  3) purchase the ReliOn blood glucose machine and check blood sugar before eating in the morning; goal is <140  4) follow up with Dr. Ariela Barker and Inga Carvajal on September 7th at 9 am  5) call Inga Carvajal at [phone] with any questions before appointment     Future Appointments  Date Time Provider Sloane Roach   8/24/2018 11:30 AM MD Tatiana Rebollar. 49   9/7/2018 9:00 AM Fabio Eduardo      Last Appointment My Department:  6/7/2018     Chart was routed to Dr. Albert Aj, RN, CDE, CCM  (Phone) [phone]

## 2018-07-12 NOTE — Clinical Note
Pt seen today and will start checking blood sugars in addition to adding some exercise and watching carbs; thanks for referral

## 2018-07-12 NOTE — PATIENT INSTRUCTIONS
Plan:  1) follow healthy meal plan by cutting back on carbohydrates (up to 60 grams per meal) and being mindful of portion control  2) start taking a brisk walk for 15 minutes in the morning most days of the week  3) purchase the ReliOn blood glucose machine and check blood sugar before eating in the morning; goal is <140  4) follow up with Dr. Litzy De Jesus and Татьяна Hunter on September 7th at 9 am  5) call Татьяна Hunter at [phone] with any questions before appointment

## 2018-07-12 NOTE — PROGRESS NOTES
Was asked by Dr. Mildred Flannery to see patient for being on the \"cusp of diabetes\". Last A1c was 6.5% on 6/7/18. Previous diabetes education - none. Lab Results   Component Value Date/Time    Hemoglobin A1c 6.5 (H) 06/07/2018 11:31 AM    Hemoglobin A1c 6.0 (H) 10/26/2017 12:22 PM     Presentation/Accompanied by - self ambulatory    History/Family History - pt had whipple procedure last October for Cholangiocarcinoma. He still vomits bile ~ twice a week and is having EGD and dilation later this month. 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; not a sweets eater; has started reading labels, using Splenda in his coffee and not eating honey buns for breakfast; his wife is on a meal plan to lose weight and is supportive in healthier eating  BK- 1/2 oatmeal with tbsp of peanut butter, 3-4 cups of coffee w/Splenda; on Sunday eggs and munson or sausage, no carb  LN-2 sandwiches-either hamburgers, hot dogs, ham, and sometimes adds left over [employer] baked beans; drinks water; or 12 inch sub sandwich or Whopper  DN- hearty portion of baked, grilled or broiked beef, pork, chicken or fish, gravy, white or sweet potatoes, snaps or corn or baked beans or tossed salad with non starch veggies, cheese and Western Margie drsg  SN- does not snack except drinks small G2 or low calorie gatorade at bedtime that helps with nocturnal leg cramps      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         5) Problem Solving- pt is already eating and drinking less carbs     6) Reducing Risk-   Tobacco -has never smoked  HTN - takes ramipril  HLD - not taking statin  Aspirin - takes low dose aspirin    7) Healthy Coping-   Support system - wife is supportive and is a nurse; pt is taking advantage of support of diabetes health care team and education provided this visit    Resources-   Living With Type 2 Diabetes: Where Do I Begin? with focus on what is a carb and healthy plate meal plan    Diabetes Made Simple video    Shared decision making plan with goal to lose 15#:  1) follow healthy meal plan by cutting back on carbohydrates (up to 60 grams per meal) and being mindful of portion control  2) start taking a brisk walk for 15 minutes in the morning most days of the week  3) purchase the ReliOn blood glucose machine and check blood sugar before eating in the morning; goal is <140  4) follow up with Dr. Axel Underwood and Urban Linares on September 7th at 9 am  5) call Urban Linares at [phone] with any questions before appointment    Future Appointments  Date Time Provider Sloane Roach   8/24/2018 11:30 AM MD Tatiana Rodarte. 49   9/7/2018 9:00 AM Fabio Eduardo      Last Appointment My Department:  6/7/2018    Chart was routed to Dr. Axel Underwood.     Miryam Goss, RN, CDE, CCM  (Phone) [phone]

## 2018-07-12 NOTE — MR AVS SNAPSHOT
[address] [facility] 
[phone] Patient: Linda Jones MRN: [id number] KHM:1[birth year]68/3449 Visit Information Date & Time Provider Department Dept. Phone Encounter #  
 7/12/2018  9:00 AM NURSE [address] [id number] Your Appointments 8/24/2018 11:30 AM  
ESTABLISHED PATIENT with Rome Santos MD  
[address] Oncology at [facility]) Appt Note: onc 3 mth f/u  
 [address] [facility] [place] [facility]  
[phone]  
  
   
 [address] [address]  
  
    
 9/7/2018  9:00 AM  
PHYSICAL PRE OP with Shelby Ross III, [facility] Dimitry Pineda) Appt Note: 3 month follow up  
 [facility] [address] [phone]  
[address] [facility] [address] [facility] [place] [facility] Upcoming Health Maintenance Date Due Influenza Age 5 to Adult 8/1/2018 Pneumococcal 19-64 Highest Risk (3 of 3 - PPSV23) 1/4/2022 DTaP/Tdap/Td series (2 - Td) 1/7/2023 COLONOSCOPY 6/7/2023 Allergies as of 7/12/2018  Review Complete On: 7/5/2018 By: Darrel Laura MD  
  
 Severity Noted Reaction Type Reactions Shellfish Derived High 06/15/2018   Systemic Anaphylaxis Soft shell crabs Pcn [Penicillins]  07/23/2017    Other (comments) Pt stated \"always been told PCN\" Current Immunizations  Reviewed on 5/25/2018 Name Date Pneumococcal Conjugate (PCV-13) 10/26/2017 Pneumococcal Polysaccharide (PPSV-23) 1/4/2017 Not reviewed this visit You Were Diagnosed With   
  
 Codes Comments Prediabetes    -  Primary ICD-10-CM: R73.03 
ICD-9-CM: 790.29 Vitals Smoking Status Never Smoker Preferred Pharmacy [facility] Phone [facility] [address] [place] [address] [phone] Your Updated Medication List  
  
   
This list is accurate as of 7/12/18 10:23 AM.  Always use your most recent med list.  
  
  
  
  
 aspirin delayed-release 81 mg tablet Take  by mouth daily. cyanocobalamin 1,000 mcg/mL injection Commonly known as:  VITAMIN B12  
1,000 mcg by IntraMUSCular route Twice a month.  
  
 gabapentin 300 mg capsule Commonly known as:  NEURONTIN Take 3 Caps by mouth two (2) times a day. 3 x 300mg caps (900mg) twice daily HYDROcodone-acetaminophen 5-325 mg per tablet Commonly known as:  Sabrina Parisian Take 1 Tab by mouth every four (4) hours as needed for Pain. Max Daily Amount: 6 Tabs. pantoprazole 40 mg tablet Commonly known as:  PROTONIX  
TAKE 1 BY MOUTH EVERY MORNING FOR 30 DAYS  
  
 ramipril 10 mg capsule Commonly known as:  ALTACE Take 1 Cap by mouth nightly. VITAMIN D3 1,000 unit Cap Generic drug:  cholecalciferol Take 5,000 Units by mouth daily. ZOFRAN ODT 8 mg disintegrating tablet Generic drug:  ondansetron Take 8 mg by mouth every eight (8) hours as needed for Nausea. ZyrTEC 10 mg tablet Generic drug:  cetirizine Take  by mouth nightly. Indications: AUTOIMMUNE DISORDER, SJOGRENS. Patient Instructions Plan: 
1) follow healthy meal plan by cutting back on carbohydrates (up to 60 grams per meal) and being mindful of portion control 2) start taking a brisk walk for 15 minutes in the morning most days of the week 3) purchase the ReliOn blood glucose machine and check blood sugar before eating in the morning; goal is <140 
4) follow up with Dr. Norberto Fowler and Thais Paniagua on September 7th at 9 am 
5) call Thais Paniagua at [phone] with any questions before appointment Introducing [facility] & HEALTH SERVICES! Dear Lawrence Sparks: Thank you for requesting a [employer] account.   Our records indicate that you already have an active [employer] account. You can access your account anytime at [email or web address] Did you know that you can access your hospital and ER discharge instructions at any time in [employer]? You can also review all of your test results from your hospital stay or ER visit. Additional Information If you have questions, please visit the Frequently Asked Questions section of the [employer] website at [email or web address] Remember, [employer] is NOT to be used for urgent needs. For medical emergencies, dial 911. Now available from your iPhone and Android! Please provide this summary of care documentation to your next provider. Your primary care clinician is listed as Omari Dormanr If you have any questions after today's visit, please call [phone].

## 2018-07-18 ENCOUNTER — APPOINTMENT (OUTPATIENT)
Dept: ULTRASOUND IMAGING | Age: 63
End: 2018-07-18
Attending: INTERNAL MEDICINE
Payer: COMMERCIAL

## 2018-07-18 ENCOUNTER — ANESTHESIA EVENT (OUTPATIENT)
Dept: ENDOSCOPY | Age: 63
End: 2018-07-18
Payer: COMMERCIAL

## 2018-07-18 ENCOUNTER — HOSPITAL ENCOUNTER (OUTPATIENT)
Age: 63
Setting detail: OUTPATIENT SURGERY
Discharge: HOME | End: 2018-07-18
Attending: INTERNAL MEDICINE | Admitting: INTERNAL MEDICINE
Payer: COMMERCIAL

## 2018-07-18 ENCOUNTER — ANESTHESIA (OUTPATIENT)
Dept: ENDOSCOPY | Age: 63
End: 2018-07-18
Payer: COMMERCIAL

## 2018-07-18 VITALS
DIASTOLIC BLOOD PRESSURE: 73 MMHG | BODY MASS INDEX: 27.74 KG/M2 | HEART RATE: 66 BPM | HEIGHT: 68 IN | OXYGEN SATURATION: 96 % | SYSTOLIC BLOOD PRESSURE: 113 MMHG | RESPIRATION RATE: 17 BRPM | WEIGHT: 183 LBS | TEMPERATURE: 98.6 F

## 2018-07-18 PROCEDURE — 76060000032 HC ANESTHESIA 0.5 TO 1 HR: Performed by: INTERNAL MEDICINE

## 2018-07-18 PROCEDURE — 77030007289 HC DEV ROTH NET RETRV STRC -B: Performed by: INTERNAL MEDICINE

## 2018-07-18 PROCEDURE — 74011000250 HC RX REV CODE- 250

## 2018-07-18 PROCEDURE — 88342 IMHCHEM/IMCYTCHM 1ST ANTB: CPT | Performed by: INTERNAL MEDICINE

## 2018-07-18 PROCEDURE — 77030013992 HC SNR POLYP ENDOSC BSC -B: Performed by: INTERNAL MEDICINE

## 2018-07-18 PROCEDURE — 74011250636 HC RX REV CODE- 250/636: Performed by: INTERNAL MEDICINE

## 2018-07-18 PROCEDURE — 74011250636 HC RX REV CODE- 250/636

## 2018-07-18 PROCEDURE — 76040000007: Performed by: INTERNAL MEDICINE

## 2018-07-18 PROCEDURE — 77030036911 HC NDL INJ ENTERSCP FORCE MAX OCOA -B: Performed by: INTERNAL MEDICINE

## 2018-07-18 PROCEDURE — 77030011640 HC PAD GRND REM COVD -A: Performed by: INTERNAL MEDICINE

## 2018-07-18 PROCEDURE — 88305 TISSUE EXAM BY PATHOLOGIST: CPT | Performed by: INTERNAL MEDICINE

## 2018-07-18 PROCEDURE — 77030038665 HC SOL ELEVIEW INJ AGNT ARPH -B: Performed by: INTERNAL MEDICINE

## 2018-07-18 RX ORDER — NALOXONE HYDROCHLORIDE 0.4 MG/ML
0.4 INJECTION, SOLUTION INTRAMUSCULAR; INTRAVENOUS; SUBCUTANEOUS
Status: DISCONTINUED | OUTPATIENT
Start: 2018-07-18 | End: 2018-07-18 | Stop reason: HOSPADM

## 2018-07-18 RX ORDER — PROPOFOL 10 MG/ML
INJECTION, EMULSION INTRAVENOUS AS NEEDED
Status: DISCONTINUED | OUTPATIENT
Start: 2018-07-18 | End: 2018-07-18 | Stop reason: HOSPADM

## 2018-07-18 RX ORDER — SODIUM CHLORIDE 9 MG/ML
50 INJECTION, SOLUTION INTRAVENOUS CONTINUOUS
Status: DISCONTINUED | OUTPATIENT
Start: 2018-07-18 | End: 2018-07-18 | Stop reason: HOSPADM

## 2018-07-18 RX ORDER — FLUMAZENIL 0.1 MG/ML
0.2 INJECTION INTRAVENOUS
Status: DISCONTINUED | OUTPATIENT
Start: 2018-07-18 | End: 2018-07-18 | Stop reason: HOSPADM

## 2018-07-18 RX ORDER — FENTANYL CITRATE 50 UG/ML
100 INJECTION, SOLUTION INTRAMUSCULAR; INTRAVENOUS
Status: DISCONTINUED | OUTPATIENT
Start: 2018-07-18 | End: 2018-07-18 | Stop reason: HOSPADM

## 2018-07-18 RX ORDER — ATROPINE SULFATE 0.1 MG/ML
0.5 INJECTION INTRAVENOUS
Status: DISCONTINUED | OUTPATIENT
Start: 2018-07-18 | End: 2018-07-18 | Stop reason: HOSPADM

## 2018-07-18 RX ORDER — LIDOCAINE HYDROCHLORIDE 20 MG/ML
INJECTION, SOLUTION EPIDURAL; INFILTRATION; INTRACAUDAL; PERINEURAL AS NEEDED
Status: DISCONTINUED | OUTPATIENT
Start: 2018-07-18 | End: 2018-07-18 | Stop reason: HOSPADM

## 2018-07-18 RX ORDER — SODIUM CHLORIDE 0.9 % (FLUSH) 0.9 %
5-10 SYRINGE (ML) INJECTION AS NEEDED
Status: DISCONTINUED | OUTPATIENT
Start: 2018-07-18 | End: 2018-07-18 | Stop reason: HOSPADM

## 2018-07-18 RX ORDER — MIDAZOLAM HYDROCHLORIDE 1 MG/ML
.25-1 INJECTION, SOLUTION INTRAMUSCULAR; INTRAVENOUS
Status: DISCONTINUED | OUTPATIENT
Start: 2018-07-18 | End: 2018-07-18 | Stop reason: HOSPADM

## 2018-07-18 RX ORDER — SODIUM CHLORIDE 9 MG/ML
INJECTION, SOLUTION INTRAVENOUS
Status: DISCONTINUED | OUTPATIENT
Start: 2018-07-18 | End: 2018-07-18 | Stop reason: HOSPADM

## 2018-07-18 RX ORDER — EPINEPHRINE 0.1 MG/ML
1 INJECTION INTRACARDIAC; INTRAVENOUS
Status: DISCONTINUED | OUTPATIENT
Start: 2018-07-18 | End: 2018-07-18 | Stop reason: HOSPADM

## 2018-07-18 RX ORDER — DEXTROMETHORPHAN/PSEUDOEPHED 2.5-7.5/.8
1.2 DROPS ORAL
Status: DISCONTINUED | OUTPATIENT
Start: 2018-07-18 | End: 2018-07-18 | Stop reason: HOSPADM

## 2018-07-18 RX ORDER — SODIUM CHLORIDE 0.9 % (FLUSH) 0.9 %
5-10 SYRINGE (ML) INJECTION EVERY 8 HOURS
Status: DISCONTINUED | OUTPATIENT
Start: 2018-07-18 | End: 2018-07-18 | Stop reason: HOSPADM

## 2018-07-18 RX ADMIN — PROPOFOL 20 MG: 10 INJECTION, EMULSION INTRAVENOUS at 08:37

## 2018-07-18 RX ADMIN — LIDOCAINE HYDROCHLORIDE 100 MG: 20 INJECTION, SOLUTION EPIDURAL; INFILTRATION; INTRACAUDAL; PERINEURAL at 08:17

## 2018-07-18 RX ADMIN — PROPOFOL 50 MG: 10 INJECTION, EMULSION INTRAVENOUS at 08:27

## 2018-07-18 RX ADMIN — PROPOFOL 50 MG: 10 INJECTION, EMULSION INTRAVENOUS at 08:25

## 2018-07-18 RX ADMIN — PROPOFOL 70 MG: 10 INJECTION, EMULSION INTRAVENOUS at 08:18

## 2018-07-18 RX ADMIN — PROPOFOL 20 MG: 10 INJECTION, EMULSION INTRAVENOUS at 08:39

## 2018-07-18 RX ADMIN — SODIUM CHLORIDE: 9 INJECTION, SOLUTION INTRAVENOUS at 08:11

## 2018-07-18 RX ADMIN — PROPOFOL 20 MG: 10 INJECTION, EMULSION INTRAVENOUS at 08:29

## 2018-07-18 RX ADMIN — PROPOFOL 20 MG: 10 INJECTION, EMULSION INTRAVENOUS at 08:33

## 2018-07-18 RX ADMIN — PROPOFOL 20 MG: 10 INJECTION, EMULSION INTRAVENOUS at 08:22

## 2018-07-18 RX ADMIN — SODIUM CHLORIDE 50 ML/HR: 900 INJECTION, SOLUTION INTRAVENOUS at 07:57

## 2018-07-18 RX ADMIN — PROPOFOL 20 MG: 10 INJECTION, EMULSION INTRAVENOUS at 08:41

## 2018-07-18 NOTE — IP AVS SNAPSHOT
[address] 
[phone] Patient: Palomo Hudson MRN: [id number] ZWX:[id number] About your hospitalization You were admitted on:  July 18, 2018 You last received care in the:  [facility] ENDOSCOPY You were discharged on:  July 18, 2018 Why you were hospitalized Your primary diagnosis was:  Not on File Follow-up Information None Your Scheduled Appointments Friday August 24, 2018 11:30 AM EDT  
ESTABLISHED PATIENT with Rj Hahn MD  
[address] [facility] Oncology at [facility]) [address] [facility] [address] [facility]  
[phone] Friday September 07, 2018  9:00 AM EDT PHYSICAL PRE OP with Mahi Hernandez III, DO DHEERAJ [facility] [address] [facility] [address] [facility]  
[phone] Discharge Orders None A check geremias indicates which time of day the medication should be taken. My Medications CONTINUE taking these medications Instructions Each Dose to Equal  
 Morning Noon Evening Bedtime  
 cyanocobalamin 1,000 mcg/mL injection Commonly known as:  VITAMIN B12 Your last dose was: Your next dose is:    
   
   
 1,000 mcg by IntraMUSCular route Twice a month. 1000 mcg  
    
   
   
   
  
 gabapentin 300 mg capsule Commonly known as:  NEURONTIN Your last dose was: Your next dose is: Take 3 Caps by mouth two (2) times a day. 3 x 300mg caps (900mg) twice daily 900 mg  
    
   
   
   
  
 ramipril 10 mg capsule Commonly known as:  ALTACE Your last dose was: Your next dose is: Take 1 Cap by mouth nightly. 10 mg  
    
   
   
   
  
 VITAMIN D3 PO Your last dose was: Your next dose is: Take  by mouth daily. ZOFRAN ODT 8 mg disintegrating tablet Generic drug:  ondansetron Your last dose was: Your next dose is: Take 8 mg by mouth every eight (8) hours as needed for Nausea. 8 mg ZyrTEC 10 mg tablet Generic drug:  cetirizine Your last dose was: Your next dose is: Take 10 mg by mouth nightly. Indications: AUTOIMMUNE DISORDER, SJOGRENS. 10 mg Discharge Instructions [address]. 
[address] [facility] [address] [place], [address]  
[phone] DISCHARGE INSTRUCTIONS Lokesh Ford 
[id number] 
1955 DISCOMFORT: 
Sore throat- throat lozenges or warm salt water gargle 
redness at IV site- apply warm compress to area; if redness or soreness persist- contact your physician Gaseous discomfort- walking, belching will help relieve any discomfort You may not operate a vehicle for 12 hours You may not engage in an occupation involving machinery or appliances for rest of today You may not drink alcoholic beverages for at least 12 hours Avoid making any critical decisions for at least 24 hour DIET You may eat and drink after you leave. You may resume your regular diet  however -  remember your colon is empty and a heavy meal will produce gas. Avoid these foods:  vegetables, fried / greasy foods, carbonated drinks ACTIVITY You may resume your normal daily activities Spend the remainder of the day resting -  avoid any strenuous activity. CALL M.D. ANY SIGN OF Increasing pain, nausea, vomiting Abdominal distension (swelling) New increased bleeding (oral or rectal) Fever (chills) Pain in chest area Bloody discharge from nose or mouth Shortness of breath Follow-up Instructions: 
 Call Dr. Kalen Cole for any questions or problems.  
  
If we took a biopsy please call the office within 2 weeks to discuss your pathology results. Telephone # [phone] Continue same medications. Introducing [facility]! Dear Annita Nelson: Thank you for requesting a WebEvents account. Our records indicate that you already have an active WebEvents account. You can access your account anytime at [email or web address] Did you know that you can access your hospital and ER discharge instructions at any time in WebEvents? You can also review all of your test results from your hospital stay or ER visit. Additional Information If you have questions, please visit the Frequently Asked Questions section of the WebEvents website at [email or web address] Remember, WebEvents is NOT to be used for urgent needs. For medical emergencies, dial 911. Now available from your iPhone and Android! Introducing Santana Marrufo As a [facility] patient, I wanted to make you aware of our electronic visit tool called [facility] Mar[employer]fo. [facility] 24/7 allows you to connect within minutes with a medical provider 24 hours a day, seven days a week via a mobile device or tablet or logging into a secure website from your computer. You can access Santana Marrufo from anywhere in the [place]. A virtual visit might be right for you when you have a simple condition and feel like you just dont want to get out of bed, or cant get away from work for an appointment, when your regular [facility] provider is not available (evenings, weekends or holidays), or when youre out of town and need minor care. Electronic visits cost only $49 and if the [facility] 24/7 provider determines a prescription is needed to treat your condition, one can be electronically transmitted to a nearby pharmacy*. Please take a moment to enroll today if you have not already done so. The enrollment process is free and takes just a few minutes.   To enroll, please download the [employer] 24/7 ciara to your tablet or phone, or visit [email or web address] to enroll on your computer. And, as an [address] [place] [address] patient with a [facility]w[employer] account, the results of your visits will be scanned into your electronic medical record and your primary care provider will be able to view the scanned results. We urge you to continue to see your regular [employer] provider for your ongoing medical care. And while your primary care provider may not be the one available when you seek a AIM virtual visit, the peace of mind you get from getting a real diagnosis real time can be priceless. For more information on AIM, view our Frequently Asked Questions (FAQs) at [email or web address]. Sincerely, 
 
Janelle Taylor MD 
Chief Medical Officer [employer] *:  certain medications cannot be prescribed via AIM Unresulted Labs-Please follow up with your PCP about these lab tests Order Current Status US ENDO ENDOSCOPIC ULTRASOUND In process Providers Seen During Your Hospitalization Provider Specialty Primary office phone Brenda Luna MD Gastroenterology [phone] Your Primary Care Physician (PCP) Primary Care Physician Office Phone Office Fax Blas OGDEN [phone] You are allergic to the following Allergen Reactions Shellfish Derived Anaphylaxis Soft shell crabs Pcn (Penicillins) Other (comments) Pt stated \"always been told PCN\" Recent Documentation Height Weight BMI Smoking Status 1.727 m 83 kg 27.83 kg/m2 Never Smoker Emergency Contacts Name Discharge Info Relation Home Work Mobile Hyun Cristina DISCHARGE CAREGIVER [3] Spouse [3] [phone] Roberto Cristina DISCHARGE CAREGIVER [3] Child [2] [phone] Ridge Cristina III DISCHARGE CAREGIVER [3] Child [2] [phone] AkhilChristelle brannon  Spouse [3] [phone] Patient Belongings The following personal items are in your possession at time of discharge: 
  Dental Appliances: None  Visual Aid: Glasses Please provide this summary of care documentation to your next provider. Signatures-by signing, you are acknowledging that this After Visit Summary has been reviewed with you and you have received a copy. Patient Signature:  ____________________________________________________________ Date:  ____________________________________________________________  
  
[facility] Provider Signature:  ____________________________________________________________ Date:  ____________________________________________________________

## 2018-07-18 NOTE — ANESTHESIA POSTPROCEDURE EVALUATION
Post-Anesthesia Evaluation and Assessment    Patient: Palomo Hudson MRN: [id number]  SSN: [id number]    YOB: 1955  Age: 65 y.o. Sex: male       Cardiovascular Function/Vital Signs  Visit Vitals    /73    Pulse 66    Temp 37 °C (98.6 °F)    Resp 17    Ht 5' 8\" (1.727 m)    Wt 83 kg (183 lb)    SpO2 96%    BMI 27.83 kg/m2       Patient is status post MAC anesthesia for Procedure(s):  ESOPHAGOGASTRODUODENOSCOPY (EGD)  INJECTION  ENDOSCOPIC POLYPECTOMY. Nausea/Vomiting: None    Postoperative hydration reviewed and adequate. Pain:  Pain Scale 1: Numeric (0 - 10) (07/18/18 0910)  Pain Intensity 1: 0 (07/18/18 0910)   Managed    Neurological Status: At baseline    Mental Status and Level of Consciousness: Arousable    Pulmonary Status:   O2 Device: Room air (07/18/18 0910)   Adequate oxygenation and airway patent    Complications related to anesthesia: None    Post-anesthesia assessment completed.  No concerns    Signed By: Angie Hernandez MD     July 18, 2018

## 2018-07-18 NOTE — DISCHARGE INSTRUCTIONS
[address]  [address] [facility] 33 [facility]  [id number]  1955    DISCOMFORT:  Sore throat- throat lozenges or warm salt water gargle  redness at IV site- apply warm compress to area; if redness or soreness persist- contact your physician  Gaseous discomfort- walking, belching will help relieve any discomfort  You may not operate a vehicle for 12 hours  You may not engage in an occupation involving machinery or appliances for rest of today  You may not drink alcoholic beverages for at least 12 hours  Avoid making any critical decisions for at least 24 hour  DIET  You may eat and drink after you leave. You may resume your regular diet - however -  remember your colon is empty and a heavy meal will produce gas. Avoid these foods:  vegetables, fried / greasy foods, carbonated drinks    ACTIVITY  You may resume your normal daily activities   Spend the remainder of the day resting -  avoid any strenuous activity. CALL M.D. ANY SIGN OF   Increasing pain, nausea, vomiting  Abdominal distension (swelling)  New increased bleeding (oral or rectal)  Fever (chills)  Pain in chest area  Bloody discharge from nose or mouth  Shortness of breath    Follow-up Instructions:   Call Dr. Eduarda Shah for any questions or problems. If we took a biopsy please call the office within 2 weeks to discuss your                             pathology results. Telephone # [phone]        Continue same medications.

## 2018-07-18 NOTE — ANESTHESIA PREPROCEDURE EVALUATION
Anesthetic History   No history of anesthetic complications  PONV          Review of Systems / Medical History  Patient summary reviewed, nursing notes reviewed and pertinent labs reviewed    Pulmonary  Within defined limits                 Neuro/Psych   Within defined limits           Cardiovascular  Within defined limits  Hypertension        Dysrhythmias            GI/Hepatic/Renal  Within defined limits              Endo/Other  Within defined limits           Other Findings              Physical Exam    Airway  Mallampati: II  TM Distance: > 6 cm  Neck ROM: normal range of motion   Mouth opening: Normal     Cardiovascular  Regular rate and rhythm,  S1 and S2 normal,  no murmur, click, rub, or gallop             Dental  No notable dental hx       Pulmonary  Breath sounds clear to auscultation               Abdominal  GI exam deferred       Other Findings            Anesthetic Plan    ASA: 2  Anesthesia type: MAC          Induction: Intravenous  Anesthetic plan and risks discussed with: Patient

## 2018-07-18 NOTE — PROCEDURES
118 [facility].  217 [facility] 140 [facility], [address]  [phone]                            NAME:  Heidi Stein   :   1955   MRN:   [id number]     Date/Time:  2018 8:56 AM    Esophagogastroduodenoscopy (EGD) Procedure Note    :  Tia Castorena MD    Referring Provider:  Cynthia Germain III,     Anethesia/Sedation:  MAC anesthesia    Procedure Details     After infom consent was obtained for the procedure, with all risks and benefits of procedure explained the patient was taken to the endoscopy suite and placed in the left lateral decubitus position. Following sequential administration of sedation as per above, the [id number] gastroscope was inserted into the mouth and advanced under direct vision to proximal jejunum. A careful inspection was made as the gastroscope was withdrawn, including a retroflexed view of the proximal stomach; findings and interventions are described below. Findings:  Esophagus:normal  Stomach: A pedunculated polyp with wide base was seen in distal gastric body. This measured about 25 mm. Food residue was present. Duodenum/jejunum: Pylorus preseving Whipple procedure anatomy was noted. No anastomotic stricture was noted. Therapies:  injection of 17 cc of Eleview was successful in lifting the lesion  1 piece meal polypectomy were performed using hot snare  and the polyps were  retrieved    Specimens: gastric polyp           EBL: minimal    Complications:   None; patient tolerated the procedure well. Impression:    See Postoperative diagnosis above    Recommendations:  -Continue acid suppression. , -Await pathology. , -Follow symptoms. , -No NSAID's/Aspirin for 1 week, -EGD in 3 months to check for adequacy or resection    Discharge disposition:  Home in the company of  when able to ambulate    Tia Castorena MD

## 2018-07-18 NOTE — ROUTINE PROCESS
Kristy Horne  1955  [id number]    Situation:  Verbal report received from: Wilda RN  Procedure: Procedure(s):  ESOPHAGOGASTRODUODENOSCOPY (EGD)  INJECTION  ENDOSCOPIC POLYPECTOMY    Background:    Preoperative diagnosis: Benign gastric polyp [K31.7]  Postoperative diagnosis: * No post-op diagnosis entered *    :  Dr. Kamille Kruse  Assistant(s): Endoscopy Technician-1: Koki Amin  Endoscopy RN-1: Angélica Wright RN    Specimens:   ID Type Source Tests Collected by Time Destination   1 : gastric polyp Preservative   Kailey Fragoso MD 7/18/2018 0848 Pathology     H. Pylori  no    Assessment:  Intra-procedure medications     Anesthesia gave intra-procedure sedation and medications, see anesthesia flow sheet yes    Intravenous fluids: NS@ KVO     Vital signs stable     Abdominal assessment: round and soft     Recommendation:  Discharge patient per MD order.     Family or Friend   Permission to share finding with family or friend yes

## 2018-07-18 NOTE — PERIOP NOTES
TRANSFER - IN REPORT:    Verbal report received from  [facility]) on [facility]  being received for ordered procedure      Report consisted of patients Situation, Background, Assessment and   Recommendations(SBAR). Information from the following report(s) SBAR was reviewed with the receiving nurse. Opportunity for questions and clarification was provided. Assessment completed upon patients arrival to unit and care assumed. .Patient has been evaluated by anesthesia pre-procedure. Patient alert and oriented. Vital signs will not be charted by the Endoscopy nurse. All vitals, airway, and loc are monitored by anesthesia staff throughout procedure.        .Endoscope was pre-cleaned at bedside immediately following procedure by Lucero Ashton

## 2018-07-18 NOTE — H&P
[address] [facility] [address] 
[address] [place], [address] 
[phone] History and Physical  
 
NAME: Ofelia Salgado :  1955 MRN:  [id number] HPI:  The patient was seen and examined. Past Surgical History:  
Procedure Laterality Date  ABDOMEN SURGERY PROC UNLISTED  10/2017 Whipple  HX GI    
 COLONOSCOPY, POLYPS (BENIGN)  HX GI  10/06/2017 Viktor Bee [address] [facility] [address]  2005 Ablation [address] Spinal fusion W/ HARDWARE  
 HX OTHER SURGICAL  2017 Camila Postal, Dr. Don Herrera [facility] VASCULAR ACCESS  2017 PORTACATH - then removed  NEUROLOGICAL PROCEDURE UNLISTED   Ting hole washout from cerebral hemorrhage Past Medical History:  
Diagnosis Date  Arrhythmia Previous a.fib, ablation, NSR now; NO LONGER FOLLOWED BY CARDIOLOGIST.  Autoimmune disease ([facility] SJOGREN'S  
 Cancer ([facility] COMMON BILE DUCT, ADENOCARCINOMA  Hypertension  Ill-defined condition   
 pre-diabetic - diet controlled  Sepsis ([facility] 2017 STENTING TO BILE DUCT  Status post chemotherapy  Stroke [facility]) 2008  
 brain aneurysm - no deficits Social History Substance Use Topics  Smoking status: Never Smoker  Smokeless tobacco: Never Used  Alcohol use Yes Comment: very rare Allergies Allergen Reactions  Shellfish Derived Anaphylaxis Soft shell crabs  Pcn [Penicillins] Other (comments) Pt stated \"always been told PCN\" Family History Problem Relation Age of Onset  Cancer Father Breast and Colon  Cancer Mother LEUKEMIA  
 No Known Problems Sister  No Known Problems Brother  No Known Problems Sister  Anesth Problems Neg Hx Current Facility-Administered Medications Medication Dose Route Frequency  0.9% sodium chloride infusion  50 mL/hr IntraVENous CONTINUOUS  
 sodium chloride (NS) flush 5-10 mL  5-10 mL IntraVENous Q8H  
 sodium chloride (NS) flush 5-10 mL  5-10 mL IntraVENous PRN  
 midazolam (VERSED) injection 0.25-10 mg  0.25-10 mg IntraVENous Multiple  fentaNYL citrate (PF) injection 100 mcg  100 mcg IntraVENous MULTIPLE DOSE GIVEN  
 naloxone (NARCAN) injection 0.4 mg  0.4 mg IntraVENous Multiple  flumazenil (ROMAZICON) 0.1 mg/mL injection 0.2 mg  0.2 mg IntraVENous Multiple  simethicone (MYLICON) 77FF/9.5JF oral drops 80 mg  1.2 mL Oral Multiple  atropine injection 0.5 mg  0.5 mg IntraVENous ONCE PRN  
 EPINEPHrine (ADRENALIN) 0.1 mg/mL syringe 1 mg  1 mg Endoscopically ONCE PRN PHYSICAL EXAM: 
General: WD, WN. Alert, cooperative, no acute distress   
HEENT: NC, Atraumatic. PERRLA, EOMI. Anicteric sclerae. Lungs:  CTA Bilaterally. No Wheezing/Rhonchi/Rales. Heart:  Regular  rhythm,  No murmur, No Rubs, No Gallops Abdomen: Soft, Non distended, Non tender.  +Bowel sounds, no HSM Extremities: No c/c/e Neurologic:  CN 2-12 gi, Alert and oriented X 3. No acute neurological distress Psych:   Good insight. Not anxious nor agitated. The heart, lungs and mental status were satisfactory for the administration of MAC sedation and for the procedure. Mallampati score: 2 Assessment:  
· Gastric polyp Plan:  
· Endoscopic procedure · MAC sedation ·

## 2018-07-19 ENCOUNTER — PATIENT OUTREACH (OUTPATIENT)
Dept: OTHER | Age: 63
End: 2018-07-19

## 2018-07-19 NOTE — PROGRESS NOTES
[facility] Progress Note:      2:30pm  Called patient for Care Management Follow up. Left discreet voice mail with my contact information encourage a call back. * Noted EGD yesterday completed. * Check in 1 weeks if no returned call.  / follow for biopsy path. Chart Review:    Findings:  Esophagus:normal  Stomach: A pedunculated polyp with wide base was seen in distal gastric body. This measured about 25 mm. Food residue was present. Duodenum/jejunum: Pylorus preseving Whipple procedure anatomy was noted.  No anastomotic stricture was noted.         Therapies:  injection of 17 cc of Eleview was successful in lifting the lesion  1 piece meal polypectomy were performed using hot snare  and the polyps were  retrieved     Specimens: gastric polyp

## 2018-07-20 ENCOUNTER — APPOINTMENT (OUTPATIENT)
Dept: INFUSION THERAPY | Age: 63
End: 2018-07-20

## 2018-07-26 ENCOUNTER — PATIENT OUTREACH (OUTPATIENT)
Dept: OTHER | Age: 63
End: 2018-07-26

## 2018-07-26 NOTE — PROGRESS NOTES
[facility] telephonic outreach to follow up with patient, on behalf of Norma Javier RN. Patient verified  and zip code. Patient confirmed that EGD was completed, as well as Biopsy, in which polyp was removed. He confirmed a follow up appointment scheduled for  with his PCP  Also confirmed appointment on  with oncologist, Virginia Natarajan. Patient blood pressure is stable at this time. No changes in medication since last contact. Patient has no complaints of discomfort or concern at this time. Informed that Norma Javier RN would be following up for further care management.

## 2018-07-30 ENCOUNTER — OFFICE VISIT (OUTPATIENT)
Dept: INTERNAL MEDICINE CLINIC | Age: 63
End: 2018-07-30

## 2018-07-30 VITALS
TEMPERATURE: 97.8 F | DIASTOLIC BLOOD PRESSURE: 75 MMHG | SYSTOLIC BLOOD PRESSURE: 126 MMHG | BODY MASS INDEX: 27.89 KG/M2 | WEIGHT: 184 LBS | RESPIRATION RATE: 16 BRPM | HEART RATE: 78 BPM | HEIGHT: 68 IN | OXYGEN SATURATION: 97 %

## 2018-07-30 DIAGNOSIS — C22.1 CHOLANGIOCARCINOMA OF BILIARY TRACT (HCC): ICD-10-CM

## 2018-07-30 DIAGNOSIS — E11.9 CONTROLLED TYPE 2 DIABETES MELLITUS WITHOUT COMPLICATION, WITHOUT LONG-TERM CURRENT USE OF INSULIN (HCC): Primary | Chronic | ICD-10-CM

## 2018-07-30 DIAGNOSIS — I10 ESSENTIAL HYPERTENSION: Chronic | ICD-10-CM

## 2018-07-30 DIAGNOSIS — I48.0 PAROXYSMAL ATRIAL FIBRILLATION (HCC): Chronic | ICD-10-CM

## 2018-07-30 RX ORDER — METFORMIN HYDROCHLORIDE 500 MG/1
500 TABLET, EXTENDED RELEASE ORAL 2 TIMES DAILY
Qty: 60 TAB | Refills: 11 | Status: SHIPPED | OUTPATIENT
Start: 2018-07-30 | End: 2018-08-18

## 2018-07-30 RX ORDER — PANTOPRAZOLE SODIUM 40 MG/1
TABLET, DELAYED RELEASE ORAL
Refills: 5 | COMMUNITY
Start: 2018-07-22 | End: 2019-11-20

## 2018-07-30 NOTE — PROGRESS NOTES
Brunilda Temple is a 65 y.o. male who presents for evaluation of continued hyperglycemia. Last seen by me June 7, 2018 for same. a1c done then was 6.5. Since then he met with diabetes education, and has cut back on sugars and carbs. Has lost 4 lbs. Checks sugars qam, and range from 142-247, with average about 180. Had an egd done recently for nausea, and had one gastric polyp removed. Pathology was benign. Gi feels that his nausea is due to hyperglycemia. Also gets cramps in legs, but that has been a long time issue. ROS: 
Constitutional: negative for fevers, chills, anorexia and weight loss Eyes:   negative for visual disturbance and irritation ENT:   negative for tinnitus,sore throat,nasal congestion,ear pain,hoarseness Respiratory:  negative for cough, hemoptysis, dyspnea,wheezing CV:   negative for chest pain, palpitations, lower extremity edema GI:   negative for nausea, vomiting, diarrhea, abdominal pain,melena Genitourinary: negative for frequency, dysuria and hematuria Musculoskel: negative for myalgias, arthralgias, back pain, muscle weakness, joint pain Neurological:  negative for headaches, dizziness, focal weakness, numbness Psychiatric:     Negative for depression or anxiety Past Medical History:  
Diagnosis Date  Arrhythmia Previous a.fib, ablation, NSR now; NO LONGER FOLLOWED BY CARDIOLOGIST.  Autoimmune disease ([address] SJOGREN'S  
 Cancer ([address] COMMON BILE DUCT, ADENOCARCINOMA  Hypertension  Ill-defined condition   
 pre-diabetic - diet controlled  Sepsis ([facility] 2017 STENTING TO BILE DUCT  Status post chemotherapy  Stroke [facility]) 2008  
 brain aneurysm - no deficits Past Surgical History:  
Procedure Laterality Date  ABDOMEN SURGERY PROC UNLISTED  10/2017 [place]  HX GI    
 COLONOSCOPY, POLYPS (BENIGN)  HX GI  10/06/2017 Viktor Kimbrough [address] [facility] [address] [facility] [address]  2005 Ablation  HX ORTHOPAEDIC 2000  
 Spinal fusion W/ HARDWARE  
 HX OTHER SURGICAL  11/07/2017 Ella Bush, Dr. Delorse Ling [facility] VASCULAR ACCESS  2017 PORTACATH - then removed  NEUROLOGICAL PROCEDURE UNLISTED  2005 Ting hole washout from cerebral hemorrhage Family History Problem Relation Age of Onset  Cancer Father Breast and Colon  Cancer Mother LEUKEMIA  
 No Known Problems Sister  No Known Problems Brother  No Known Problems Sister  Anesth Problems Neg Hx Social History Social History  Marital status:  Spouse name: N/A  
 Number of children: N/A  
 Years of education: N/A Occupational History  Not on file. Social History Main Topics  Smoking status: Never Smoker  Smokeless tobacco: Never Used  Alcohol use Yes Comment: very rare  Drug use: No  
 Sexual activity: Yes  
  Partners: Female Other Topics Concern  Not on file Social History Narrative Visit Vitals  /75 (BP 1 Location: Left arm, BP Patient Position: Sitting)  Pulse 78  Temp 97.8 °F (36.6 °C) (Oral)  Resp 16  
 Ht 5' 8\" (1.727 m)  Wt 184 lb (83.5 kg)  SpO2 97%  BMI 27.98 kg/m2 Physical Examination:  
General - Well appearing male HEENT - PERRL, TM no erythema/opacification, normal nasal turbinates, no oropharyngeal erythema or exudate, MMM Neck - supple, no bruits, no thyroidomegaly, no lymphadenopathy Pulm - clear to auscultation bilaterally Cardio - RRR, normal S1 S2, no murmur Abd - soft, nontender, no masses, no HSM Extrem - no edema, +2 distal pulses Neuro-  No focal deficits, CN intact Assessment/Plan: 1.  Dm, type 2, new dx--start glucophage xr 500 with dinner for one week, then increase to bid. Continue to check qam 
2.   htn--controlled with altace 3. pafib--sp ablation, remains nsr 4. Hx cholangiocarcinoma--sp resection.   Repeat scans all negative, had portacath removed 
 
rtc 3 months Venita Ricks III, DO

## 2018-07-30 NOTE — PATIENT INSTRUCTIONS
Learning About Tests When You Have Diabetes Why do you need regular diabetes tests? Diabetes can be hard on your body if it's not well controlled. But having tests on a regular schedule can help you and your doctor find problems early, when it's easier to start managing them. What tests do you need? The tests you may have, how often you should have them, and the goals of the tests are: 
A1c blood test. This test shows the average level of blood sugar over the past 2 to 3 months. It helps your doctor see whether blood sugar levels have been staying within your target range. · How often: Every 3 to 6 months · Goal: A blood sugar level in your target range Blood pressure test: This test measures the pressure of blood flow in the arteries. Controlling blood pressure can help prevent damage to nerves and blood vessels. · How often: Every 3 to 6 months · Goal: A blood pressure level in your target range Cholesterol test: This test measures the amount of a type of fat in the blood. It is common for people with diabetes to also have high cholesterol. Too much cholesterol in the blood can build up inside the blood vessels and raise the risk for heart attack and stroke. · How often: At the time of your diabetes diagnosis, and as often as your doctor recommends after that · Goal: A cholesterol level in your target range Albumin-creatinine ratio test: This test checks for kidney damage by looking for the protein albumin (say \"al-BYOO-maine\") in the urine. Albumin is normally found in the blood. Kidney damage can let small amounts of it (microalbumin) leak into the urine. · How often: Once a year · Goal: No protein in the urine Blood creatinine test/estimated glomerular filtration (eGFR): The blood creatinine (say \"njlu-HE-hz-neen\") level shows how well your kidneys are working. Creatinine is a waste product that muscles release into the blood.  Blood creatinine is used to estimate the glomerular filtration rate. A high level of creatinine and/or a low eGFR may mean your kidneys are not working as well as they should. · How often: Once a year · Goal: Normal level of creatinine in the blood. The eGFR goal is greater than 60 mL/min/1.73 m². Complete foot exam: The doctor checks for foot sores and whether any sensation has been lost. 
· How often: Once a year · Goal: Healthy feet with no foot ulcers or loss of feeling Dental exam and cleaning: The dentist checks for gum disease and tooth decay. People with high blood sugar are more likely to have these problems. · How often: Every 6 months · Goal: Healthy teeth and gums Complete eye exam: High blood sugar levels can damage the eyes. This exam is done by an ophthalmologist or optometrist. It includes a dilated eye exam. The exam shows whether there's damage to the back of the eye (diabetic retinopathy). · How often: Once a year. If you don't have any signs of diabetic retinopathy, your doctor may recommend an exam every 2 years. · Goal: No damage to the back of the eye Thyroid-stimulating hormone (TSH) blood test: This test checks for thyroid disease. Too little thyroid hormone can cause some medicines (like insulin) to stay in the body longer. This can cause low blood sugar. You may be tested if you have high cholesterol or are a woman over 48years old. · How often: As part of your diabetes diagnosis, and as often as your doctor recommends after that · Goal: Normal level of TSH in the blood Follow-up care is a key part of your treatment and safety. Be sure to make and go to all appointments, and call your doctor if you are having problems. It's also a good idea to know your test results and keep a list of the medicines you take. Where can you learn more? Go to http://sandra-jenelle.info/. Enter [id number] in the search box to learn more about \"Learning About Tests When You Have Diabetes. \" Current as of: December 7, 2017 Content Version: 11.7 
© 20066204-4026 [employer]. Care instructions adapted under license by [employer] (which disclaims liability or warranty for this information). If you have questions about a medical condition or this instruction, always ask your healthcare professional. [address] any warranty or liability for your use of this information. Diabetes Blood Sugar Emergencies: Your Action Plan How can you prevent a blood sugar emergency? An important part of living with diabetes is keeping your blood sugar in your target range. You'll need to know what to do if it's too high or too low. Managing your blood sugar levels helps you avoid emergencies. This care sheet will teach you about the signs of high and low blood sugar. It will help you make an action plan with your doctor for when these signs occur. Low blood sugar is more likely to happen if you take certain medicines for diabetes. It can also happen if you skip a meal, drink alcohol, or exercise more than usual. 
You may get high blood sugar if you eat differently than you normally do. One example is eating more carbohydrate than usual. Having a cold, the flu, or other sudden illness can also cause high blood sugar levels. Levels can also rise if you miss a dose of medicine. Any change in how you take your medicine may affect your blood sugar level. So it's important to work with your doctor before you make any changes. Check your blood sugar Work with your doctor to fill in the blank spaces below that apply to you. Track your levels, know your target range, and write down ways you can get your blood sugar back in your target range. A log book can help you track your levels. Take the book to all of your medical appointments. · Check your blood sugar _____ times a day, at these times:________________________________________________.   (For example: Before meals, at bedtime, before exercise, during exercise, other.) · Your blood sugar target range before a meal is ___________________. Your blood sugar target range after a meal is _______________________. · Do zuyj-___________________________________________________-la get your blood sugar back within your safe range if your blood sugar results are _________________________________________. (For example: Less than 70 or above 250 mg/dL.) Call your doctor when your blood sugar results are ___________________________________. (For example: Less than 70 or above 250 mg/dL.) What are the symptoms of low and high blood sugar? Common symptoms of low blood sugar are sweating and feeling shaky, weak, hungry, or confused. Symptoms can start quickly. Common symptoms of high blood sugar are feeling very thirsty or very hungry. You may also pass urine more often than usual. You may have blurry vision and may lose weight without trying. But some people may have high or low blood sugar without having any symptoms. That's a good reason to check your blood sugar on a regular schedule. What should you do if you have symptoms? Work with your doctor to fill in the blank spaces below that apply to you. Low blood sugar If you have symptoms of low blood sugar, check your blood sugar. If it's below _____ ( for example, below 70), eat or drink a quick-sugar food that has about 15 grams of carbohydrate. Your goal is to get your level back to your safe range. Check your blood sugar again 15 minutes later. If it's still not in your target range, take another 15 grams of carbohydrate and check your blood sugar again in 15 minutes. Repeat this until you reach your target. Then go back to your regular testing schedule. When you have low blood sugar, it's best to stop or reduce any physical activity until your blood sugar is back in your target range and is stable. If you must stay active, eat or drink 30 grams of carbohydrate. Then check your blood sugar again in 15 minutes.  If it's not in your target range, take another 30 grams of carbohydrates. Check your blood sugar again in 15 minutes. Keep doing this until you reach your target. You can then go back to your regular testing schedule. If your symptoms or blood sugar levels are getting worse or have not improved after 15 minutes, seek medical care right away. Here are some examples of quick-sugar foods with 15 grams of carbohydrate: · 3 or 4 glucose tablets · 1 tube of glucose gel · Hard candy (such as 3 Jolly Ranchers or 5 to [employer]) · ½ cup to ¾ cup (4 to 6 ounces) of fruit juice or regular (not diet) soda High blood sugar If you have symptoms of high blood sugar, check your blood sugar. Your goal is to get your level back to your target range. If it's above ______ ( for example, above 250), follow these steps: · If you missed a dose of your diabetes medicine, take it now. Take only the amount of medicine that you have been prescribed. Do not take more or less medicine. · Give yourself insulin if your doctor has prescribed it for high blood sugar. · Test for ketones, if the doctor told you to do so. If the results of the ketone test show a moderate-to-large amount of ketones, call the doctor for advice. · Wait 30 minutes after you take the extra insulin or the missed medicine. Check your blood sugar again. If your symptoms or blood sugar levels are getting worse or have not improved after taking these steps, seek medical care right away. Follow-up care is a key part of your treatment and safety. Be sure to make and go to all appointments, and call your doctor if you are having problems. It's also a good idea to know your test results and keep a list of the medicines you take. Where can you learn more? Go to http://sandra-jenelle.info/. Enter P455 in the search box to learn more about \"Diabetes Blood Sugar Emergencies: Your Action Plan. \" Current as of: December 7, 2017 Content Version: 11.7 © 7503-2871 [employer]wise, Incorporated. Care instructions adapted under license by [employer] (which disclaims liability or warranty for this information). If you have questions about a medical condition or this instruction, always ask your healthcare professional. [address] any warranty or liability for your use of this information. For the first week, only take glucophage with dinner. Starting the 2nd week, take it with breakfast and dinner.

## 2018-07-30 NOTE — PROGRESS NOTES
Reviewed record in preparation for visit and have obtained necessary documentation. Identified pt with two pt identifiers(name and ). Chief Complaint Patient presents with  Blood sugar problem There are no preventive care reminders to display for this patient. Mr. Chelsie Moore has a reminder for a \"due or due soon\" health maintenance. I have asked that he discuss health maintenance topic(s) due with His  primary care provider. Coordination of Care Questionnaire: 
:  
 
1) Have you been to an emergency room, urgent care clinic since your last visit? no  
Hospitalized since your last visit? no          
 
2) Have you seen or consulted any other health care providers outside of [address] [place] [address] since your last visit? no  (Include any pap smears or colon screenings in this section.) 3) Do you have an Advance Directive on file? no 
 
4) Are you interested in receiving information on Advance Directives? yes Patient is accompanied by self I have received verbal consent from Brunilda Temple to discuss any/all medical information while they are present in the room.

## 2018-07-30 NOTE — MR AVS SNAPSHOT
[address] 
[phone] Patient: Ofelia Salgado MRN: [id number] [id number] Visit Information Date & Time Provider Department Dept. Phone Encounter #  
 7/30/2018  2:15 PM Shaina Roper, [address] [id number] Follow-up Instructions Return in about 3 months (around 10/30/2018). Your Appointments 8/24/2018 11:30 AM  
ESTABLISHED PATIENT with Allyson Mak MD  
[facility] Appt Note: onc 3 mth f/u  
 [address] [facility] [address]  
[phone]  
  
   
 [address] [facility] [address] [id number]  
  
    
 9/7/2018  9:00 AM  
PHYSICAL PRE OP with Oriana Saldana III, DO [facility] [address]) Appt Note: 3 month follow up  
 [address] [id number]  
[address] [facility] [address] Upcoming Health Maintenance Date Due Influenza Age 5 to Adult 8/1/2018 Pneumococcal 19-64 Highest Risk (3 of 3 - PPSV23) 1/4/2022 DTaP/Tdap/Td series (2 - Td) 1/7/2023 COLONOSCOPY 6/7/2023 Allergies as of 7/30/2018  Review Complete On: 7/30/2018 By: Oriana Saldana III, DO Severity Noted Reaction Type Reactions Shellfish Derived High 06/15/2018   Systemic Anaphylaxis Soft shell crabs Pcn [Penicillins]  07/23/2017    Other (comments) Pt stated \"always been told PCN\" Current Immunizations  Reviewed on 5/25/2018 Name Date Pneumococcal Conjugate (PCV-13) 10/26/2017 Pneumococcal Polysaccharide (PPSV-23) 1/4/2017 Not reviewed this visit Vitals BP Pulse Temp Resp Height(growth percentile) Weight(growth percentile) 126/75 (BP 1 Location: Left arm, BP Patient Position: Sitting) 78 97.8 °F (36.6 °C) (Oral) 16 5' 8\" (1.727 m) 184 lb (83.5 kg) SpO2 BMI Smoking Status 97% 27.98 kg/m2 Never Smoker Vitals History BMI and BSA Data Body Mass Index Body Surface Area  
 27.98 kg/m 2 2 m 2 Preferred Pharmacy Pharmacy Name Phone [facility] [address] [place] [address] - Reny Richter[address] [phone] Your Updated Medication List  
  
   
This list is accurate as of 7/30/18  2:56 PM.  Always use your most recent med list.  
  
  
  
  
 cyanocobalamin 1,000 mcg/mL injection Commonly known as:  VITAMIN B12  
1,000 mcg by IntraMUSCular route Twice a month.  
  
 gabapentin 300 mg capsule Commonly known as:  NEURONTIN Take 3 Caps by mouth two (2) times a day. 3 x 300mg caps (900mg) twice daily  
  
 metFORMIN  mg tablet Commonly known as:  GLUCOPHAGE XR Take 1 Tab by mouth two (2) times a day. pantoprazole 40 mg tablet Commonly known as:  PROTONIX  
TAKE 1 BY MOUTH EVERY MORNING FOR 30 DAYS  
  
 ramipril 10 mg capsule Commonly known as:  ALTACE Take 1 Cap by mouth nightly. VITAMIN D3 PO Take  by mouth daily. ZOFRAN ODT 8 mg disintegrating tablet Generic drug:  ondansetron Take 8 mg by mouth every eight (8) hours as needed for Nausea. ZyrTEC 10 mg tablet Generic drug:  cetirizine Take 10 mg by mouth nightly. Indications: AUTOIMMUNE DISORDER, SJOGRENS. Prescriptions Sent to Pharmacy Refills  
 metFORMIN ER (GLUCOPHAGE XR) 500 mg tablet 11 Sig: Take 1 Tab by mouth two (2) times a day. Class: Normal  
 Pharmacy: [address] [place] [address] #: [phone] Route: Oral  
  
Follow-up Instructions Return in about 3 months (around 10/30/2018). Patient Instructions Learning About Tests When You Have Diabetes Why do you need regular diabetes tests? Diabetes can be hard on your body if it's not well controlled. But having tests on a regular schedule can help you and your doctor find problems early, when it's easier to start managing them. What tests do you need? The tests you may have, how often you should have them, and the goals of the tests are: 
A1c blood test. This test shows the average level of blood sugar over the past 2 to 3 months. It helps your doctor see whether blood sugar levels have been staying within your target range. · How often: Every 3 to 6 months · Goal: A blood sugar level in your target range Blood pressure test: This test measures the pressure of blood flow in the arteries. Controlling blood pressure can help prevent damage to nerves and blood vessels. · How often: Every 3 to 6 months · Goal: A blood pressure level in your target range Cholesterol test: This test measures the amount of a type of fat in the blood. It is common for people with diabetes to also have high cholesterol. Too much cholesterol in the blood can build up inside the blood vessels and raise the risk for heart attack and stroke. · How often: At the time of your diabetes diagnosis, and as often as your doctor recommends after that · Goal: A cholesterol level in your target range Albumin-creatinine ratio test: This test checks for kidney damage by looking for the protein albumin (say \"al-BYOO-maine\") in the urine. Albumin is normally found in the blood. Kidney damage can let small amounts of it (microalbumin) leak into the urine. · How often: Once a year · Goal: No protein in the urine Blood creatinine test/estimated glomerular filtration (eGFR): The blood creatinine (say \"fgff-PW-bv-neen\") level shows how well your kidneys are working. Creatinine is a waste product that muscles release into the blood.  Blood creatinine is used to estimate the glomerular filtration rate. A high level of creatinine and/or a low eGFR may mean your kidneys are not working as well as they should. · How often: Once a year · Goal: Normal level of creatinine in the blood. The eGFR goal is greater than 60 mL/min/1.73 m². Complete foot exam: The doctor checks for foot sores and whether any sensation has been lost. 
· How often: Once a year · Goal: Healthy feet with no foot ulcers or loss of feeling Dental exam and cleaning: The dentist checks for gum disease and tooth decay. People with high blood sugar are more likely to have these problems. · How often: Every 6 months · Goal: Healthy teeth and gums Complete eye exam: High blood sugar levels can damage the eyes. This exam is done by an ophthalmologist or optometrist. It includes a dilated eye exam. The exam shows whether there's damage to the back of the eye (diabetic retinopathy). · How often: Once a year. If you don't have any signs of diabetic retinopathy, your doctor may recommend an exam every 2 years. · Goal: No damage to the back of the eye Thyroid-stimulating hormone (TSH) blood test: This test checks for thyroid disease. Too little thyroid hormone can cause some medicines (like insulin) to stay in the body longer. This can cause low blood sugar. You may be tested if you have high cholesterol or are a woman over 48years old. · How often: As part of your diabetes diagnosis, and as often as your doctor recommends after that · Goal: Normal level of TSH in the blood Follow-up care is a key part of your treatment and safety. Be sure to make and go to all appointments, and call your doctor if you are having problems. It's also a good idea to know your test results and keep a list of the medicines you take. Where can you learn more? Go to http://sandra-jenelle.info/. Enter [id number] in the search box to learn more about \"Learning About Tests When You Have Diabetes. \" Current as of: December 7, 2017 Content Version: 11.7 © 9039-6946 [employer]. Care instructions adapted under license by [employer] (which disclaims liability or warranty for this information). If you have questions about a medical condition or this instruction, always ask your healthcare professional. [address] any warranty or liability for your use of this information. Diabetes Blood Sugar Emergencies: Your Action Plan How can you prevent a blood sugar emergency? An important part of living with diabetes is keeping your blood sugar in your target range. You'll need to know what to do if it's too high or too low. Managing your blood sugar levels helps you avoid emergencies. This care sheet will teach you about the signs of high and low blood sugar. It will help you make an action plan with your doctor for when these signs occur. Low blood sugar is more likely to happen if you take certain medicines for diabetes. It can also happen if you skip a meal, drink alcohol, or exercise more than usual. 
You may get high blood sugar if you eat differently than you normally do. One example is eating more carbohydrate than usual. Having a cold, the flu, or other sudden illness can also cause high blood sugar levels. Levels can also rise if you miss a dose of medicine. Any change in how you take your medicine may affect your blood sugar level. So it's important to work with your doctor before you make any changes. Check your blood sugar Work with your doctor to fill in the blank spaces below that apply to you. Track your levels, know your target range, and write down ways you can get your blood sugar back in your target range. A log book can help you track your levels. Take the book to all of your medical appointments. · Check your blood sugar _____ times a day, at these times:________________________________________________.   (For example: Before meals, at bedtime, before exercise, during exercise, other.) · Your blood sugar target range before a meal is ___________________. Your blood sugar target range after a meal is _______________________. · Do liah-___________________________________________________-ci get your blood sugar back within your safe range if your blood sugar results are _________________________________________. (For example: Less than 70 or above 250 mg/dL.) Call your doctor when your blood sugar results are ___________________________________. (For example: Less than 70 or above 250 mg/dL.) What are the symptoms of low and high blood sugar? Common symptoms of low blood sugar are sweating and feeling shaky, weak, hungry, or confused. Symptoms can start quickly. Common symptoms of high blood sugar are feeling very thirsty or very hungry. You may also pass urine more often than usual. You may have blurry vision and may lose weight without trying. But some people may have high or low blood sugar without having any symptoms. That's a good reason to check your blood sugar on a regular schedule. What should you do if you have symptoms? Work with your doctor to fill in the blank spaces below that apply to you. Low blood sugar If you have symptoms of low blood sugar, check your blood sugar. If it's below _____ ( for example, below 70), eat or drink a quick-sugar food that has about 15 grams of carbohydrate. Your goal is to get your level back to your safe range. Check your blood sugar again 15 minutes later. If it's still not in your target range, take another 15 grams of carbohydrate and check your blood sugar again in 15 minutes. Repeat this until you reach your target. Then go back to your regular testing schedule. When you have low blood sugar, it's best to stop or reduce any physical activity until your blood sugar is back in your target range and is stable. If you must stay active, eat or drink 30 grams of carbohydrate. Then check your blood sugar again in 15 minutes. If it's not in your target range, take another 30 grams of carbohydrates. Check your blood sugar again in 15 minutes. Keep doing this until you reach your target. You can then go back to your regular testing schedule. If your symptoms or blood sugar levels are getting worse or have not improved after 15 minutes, seek medical care right away. Here are some examples of quick-sugar foods with 15 grams of carbohydrate: · 3 or 4 glucose tablets · 1 tube of glucose gel · Hard candy (such as 3 Jolly Ranchers or 5 to [employer]) · ½ cup to ¾ cup (4 to 6 ounces) of fruit juice or regular (not diet) soda High blood sugar If you have symptoms of high blood sugar, check your blood sugar. Your goal is to get your level back to your target range. If it's above ______ ( for example, above 250), follow these steps: · If you missed a dose of your diabetes medicine, take it now. Take only the amount of medicine that you have been prescribed. Do not take more or less medicine. · Give yourself insulin if your doctor has prescribed it for high blood sugar. · Test for ketones, if the doctor told you to do so. If the results of the ketone test show a moderate-to-large amount of ketones, call the doctor for advice. · Wait 30 minutes after you take the extra insulin or the missed medicine. Check your blood sugar again. If your symptoms or blood sugar levels are getting worse or have not improved after taking these steps, seek medical care right away. Follow-up care is a key part of your treatment and safety. Be sure to make and go to all appointments, and call your doctor if you are having problems. It's also a good idea to know your test results and keep a list of the medicines you take. Where can you learn more? Go to http://sandra-jenelle.info/. Enter Q347 in the search box to learn more about \"Diabetes Blood Sugar Emergencies: Your Action Plan. \" 
 Current as of: December 7, 2017 Content Version: 11.7 © 9717-1016 [employer], [employer]. Care instructions adapted under license by [employer] (which disclaims liability or warranty for this information). If you have questions about a medical condition or this instruction, always ask your healthcare professional. [address] any warranty or liability for your use of this information. For the first week, only take glucophage with dinner. Starting the 2nd week, take it with breakfast and dinner. Introducing [facility]! Dear Tomas: Thank you for requesting a LawnStarter account. Our records indicate that you already have an active LawnStarter account. You can access your account anytime at [email or web address] Did you know that you can access your hospital and ER discharge instructions at any time in LawnStarter? You can also review all of your test results from your hospital stay or ER visit. Additional Information If you have questions, please visit the Frequently Asked Questions section of the LawnStarter website at [email or web address] Remember, LawnStarter is NOT to be used for urgent needs. For medical emergencies, dial 911. Now available from your iPhone and Android! Please provide this summary of care documentation to your next provider. Your primary care clinician is listed as Amy Schilling If you have any questions after today's visit, please call [phone].

## 2018-08-03 ENCOUNTER — PATIENT OUTREACH (OUTPATIENT)
Dept: OTHER | Age: 63
End: 2018-08-03

## 2018-08-03 NOTE — PROGRESS NOTES
CCM outreach 12:30pm Called patient for Care Management FU. Left discreet voice mail with my contact information encourage a call back. * Noted gastric upset is now attributed to higher BS.   
 
5:00pm  No return call. Next attempt to reach Mr. Ivana Hernadez /  [place] Sam 8/9 Chart Review:  7/30  PCP apt. Annabelle Khan is a 65 y.o. male who presents for evaluation of continued hyperglycemia. Last seen by me June 7, 2018 for same. a1c done then was 6.5. Since then he met with diabetes education, and has cut back on sugars and carbs. Has lost 4 lbs. Checks sugars qam, and range from 142-247, with average about 180. Had an egd done recently for nausea, and had one gastric polyp removed. Pathology was benign. Gi feels that his nausea is due to hyperglycemia. Also gets cramps in legs, but that has been a long time issue. Visit Vitals  /75 (BP 1 Location: Left arm, BP Patient Position: Sitting)  Pulse 78  Temp 97.8 °F (36.6 °C) (Oral)  Resp 16  
 Ht 5' 8\" (1.727 m)  Wt 184 lb (83.5 kg)  SpO2 97%  BMI 27.98 kg/m2  
 
 
 
   
Assessment/Plan: 
  
1.  Dm, type 2, new dx--start glucophage xr 500 with dinner for one week, then increase to bid. Continue to check qam 
2.   htn--controlled with altace 3. pafib--sp ablation, remains nsr 4. Hx cholangiocarcinoma--sp resection.   Repeat scans all negative, had portacath removed 
  
rtc 3 months

## 2018-08-09 ENCOUNTER — PATIENT OUTREACH (OUTPATIENT)
Dept: OTHER | Age: 63
End: 2018-08-09

## 2018-08-09 NOTE — PATIENT INSTRUCTIONS
[place] [employer] to [employer] and Families with Diabetes     As part of its commitment to employee health and well-being, [employer] has partnered with the [facility] for [employer], a new benefit that makes living with diabetes easier. Its offered at no cost to all employees and dependents covered by a [employer] who have been diagnosed with diabetes. By Enrolling in the Program You Will Receive:  [address] to [facility] with Diabetes: The Ecommo connected meter uses cellular technology to automatically upload readings, making log books and sync cables a thing of the past.   Real-time Support from Coaches: Personalized support provided from the [employer] Instant Information, secure website, or Certified Diabetes Educators.  Unlimited Strips at No Cost: Test strips and lancets are shipped to your door with the click of a button and no hidden costs or co-pays. Eligible Members  The program is available to employees and dependents who are:   Enrolled in a [employer] Diagnosed with type 1 or type 2 diabetes    To learn more or join, visit:  welcome. [employer] Games/ADALGISA  Registration code:  Gwynda Keli.     Or call:   [phone]

## 2018-08-09 NOTE — PROGRESS NOTES
[facility] progress note    Called Mr. Cristina for follow up call. Verified  and address for HIPAA security. Changes since last call include:     Med changes:  Metformin 500 mg BID   Doctors appointments :  Upcoming oncology FU end of month.    *  Mr. Ivana Hernadez is happy to hear from me. States that his cell phone has been dropping calls lately. * He and his wife are dieting together and he reports good weight loss ( self report of 5-6 pounds/  OV confirms 4 pounds), but no improvement in his gastric upset/ chronic throwing up. - Active emesis 2-3 times per week. - He reports BS range from  and that this is discouraging based on the diet changes he is committed to. - Reviewed that his gastric problems are most likely muti-factorial.  / That the cancer and manipulation/ surgery with the pancrease also impact insulin production.     - That diabetes is treatable/ but also chronic and while lifestyle changes are very important, the disease is not 'cured.'      - Encouraged him to do what he can do, but not be self-critical.      - Reviewed portion sizes/ label reading and skills he is building. * DM supplies    - His wife bought a meter and strips at the drug store. - [address] [employer] supplies are a free resource through [employer] for employees and family members with [employer]. - I will send [facility] link and information to his wife's email - No HIPAA information will be included/only link and information on resource. * Reviewed if he finds my calls helpful/ and did he want to continue.     - \"You don't even know how much you help me. Sometimes you are just what I need when I am down. \"     - Confirmed that I will remain active in CM for his benefit. * Asked about FU with Dr. Demarco Heredia.      - Blas Holley is . I asked if he has any concerns/ or if he is anxious about FU.     - \"I'm sure the cancer is gone. \"   I shared that I admired his optimism, and have added my prayers to that end.        Check in for the patients goals:    1) Goal    :  Oncology support/  PCP relationship. A) Progress - Weight loss/ diabetic control/  Completed oral chemo. B) Barriers addressed - BS still higher than expected/  DM supplies   C) Utilizing strategy  - problem solving/ patient support/  Forward information on free diabetic supplies. D) Plan for next check in   - After Labor Day. Advanced Directive:  Patient offered information on development of Advanced Care Planning. * Patient defers this topic to another time. Patient's current stage of activation:     DM and Oncology treatments  Action-  Ongoing support;  Reinforcement of change attempts; Expect set-backs;  Rolling with resistance; OARS  Maintenance - Developing a plan for maintenance and relapse prevention. Patient supportive materials  Email - Sent link and info for free diabetic supplies/  [employer]      Patient verbalized understanding of all information discussed. Pt has my contact information for any further questions, concerns, or needs.     Plan for next call:  After Labor Day   Thurs 9/6

## 2018-08-10 ENCOUNTER — HOSPITAL ENCOUNTER (OUTPATIENT)
Dept: GENERAL RADIOLOGY | Age: 63
Discharge: HOME | End: 2018-08-10
Attending: SPECIALIST

## 2018-08-16 ENCOUNTER — HOSPITAL ENCOUNTER (INPATIENT)
Age: 63
LOS: 2 days | Discharge: HOME | DRG: 440 | End: 2018-08-18
Attending: EMERGENCY MEDICINE | Admitting: HOSPITALIST
Payer: COMMERCIAL

## 2018-08-16 ENCOUNTER — APPOINTMENT (OUTPATIENT)
Dept: CT IMAGING | Age: 63
DRG: 440 | End: 2018-08-16
Attending: EMERGENCY MEDICINE
Payer: COMMERCIAL

## 2018-08-16 DIAGNOSIS — E11.65 UNCONTROLLED TYPE 2 DIABETES MELLITUS WITH HYPERGLYCEMIA, WITHOUT LONG-TERM CURRENT USE OF INSULIN (HCC): ICD-10-CM

## 2018-08-16 DIAGNOSIS — D72.829 LEUKOCYTOSIS, UNSPECIFIED TYPE: ICD-10-CM

## 2018-08-16 DIAGNOSIS — R10.9 ACUTE ABDOMINAL PAIN: ICD-10-CM

## 2018-08-16 DIAGNOSIS — K85.90 ACUTE PANCREATITIS, UNSPECIFIED COMPLICATION STATUS, UNSPECIFIED PANCREATITIS TYPE: Primary | ICD-10-CM

## 2018-08-16 LAB
ALBUMIN SERPL-MCNC: 4.1 G/DL (ref 3.5–5)
ALBUMIN/GLOB SERPL: 1.3 {RATIO} (ref 1.1–2.2)
ALP SERPL-CCNC: 207 U/L (ref 45–117)
ALT SERPL-CCNC: 57 U/L (ref 12–78)
ANION GAP SERPL CALC-SCNC: 6 MMOL/L (ref 5–15)
APPEARANCE UR: CLEAR
AST SERPL-CCNC: 23 U/L (ref 15–37)
BACTERIA URNS QL MICRO: NEGATIVE /HPF
BASOPHILS # BLD: 0 K/UL (ref 0–0.1)
BASOPHILS NFR BLD: 0 % (ref 0–1)
BILIRUB SERPL-MCNC: 1.1 MG/DL (ref 0.2–1)
BILIRUB UR QL: NEGATIVE
BUN SERPL-MCNC: 18 MG/DL (ref 6–20)
BUN/CREAT SERPL: 19 (ref 12–20)
CALCIUM SERPL-MCNC: 8.9 MG/DL (ref 8.5–10.1)
CHLORIDE SERPL-SCNC: 100 MMOL/L (ref 97–108)
CO2 SERPL-SCNC: 30 MMOL/L (ref 21–32)
COLOR UR: ABNORMAL
CREAT SERPL-MCNC: 0.97 MG/DL (ref 0.7–1.3)
DIFFERENTIAL METHOD BLD: ABNORMAL
EOSINOPHIL # BLD: 0 K/UL (ref 0–0.4)
EOSINOPHIL NFR BLD: 0 % (ref 0–7)
EPITH CASTS URNS QL MICRO: ABNORMAL /LPF
ERYTHROCYTE [DISTWIDTH] IN BLOOD BY AUTOMATED COUNT: 13.3 % (ref 11.5–14.5)
GLOBULIN SER CALC-MCNC: 3.1 G/DL (ref 2–4)
GLUCOSE BLD STRIP.AUTO-MCNC: 125 MG/DL (ref 65–100)
GLUCOSE SERPL-MCNC: 221 MG/DL (ref 65–100)
GLUCOSE UR STRIP.AUTO-MCNC: 500 MG/DL
HCT VFR BLD AUTO: 47.5 % (ref 36.6–50.3)
HGB BLD-MCNC: 16.1 G/DL (ref 12.1–17)
HGB UR QL STRIP: NEGATIVE
IMM GRANULOCYTES # BLD: 0.2 K/UL (ref 0–0.04)
IMM GRANULOCYTES NFR BLD AUTO: 1 % (ref 0–0.5)
KETONES UR QL STRIP.AUTO: 40 MG/DL
LEUKOCYTE ESTERASE UR QL STRIP.AUTO: NEGATIVE
LIPASE SERPL-CCNC: >3000 U/L (ref 73–393)
LYMPHOCYTES # BLD: 0.7 K/UL (ref 0.8–3.5)
LYMPHOCYTES NFR BLD: 4 % (ref 12–49)
MCH RBC QN AUTO: 30.7 PG (ref 26–34)
MCHC RBC AUTO-ENTMCNC: 33.9 G/DL (ref 30–36.5)
MCV RBC AUTO: 90.6 FL (ref 80–99)
MONOCYTES # BLD: 0.7 K/UL (ref 0–1)
MONOCYTES NFR BLD: 4 % (ref 5–13)
NEUTS SEG # BLD: 15.1 K/UL (ref 1.8–8)
NEUTS SEG NFR BLD: 91 % (ref 32–75)
NITRITE UR QL STRIP.AUTO: NEGATIVE
NRBC # BLD: 0 K/UL (ref 0–0.01)
NRBC BLD-RTO: 0 PER 100 WBC
PH UR STRIP: 7 [PH] (ref 5–8)
PLATELET # BLD AUTO: 160 K/UL (ref 150–400)
PMV BLD AUTO: 11.2 FL (ref 8.9–12.9)
POTASSIUM SERPL-SCNC: 4 MMOL/L (ref 3.5–5.1)
PROT SERPL-MCNC: 7.2 G/DL (ref 6.4–8.2)
PROT UR STRIP-MCNC: NEGATIVE MG/DL
RBC # BLD AUTO: 5.24 M/UL (ref 4.1–5.7)
RBC #/AREA URNS HPF: ABNORMAL /HPF (ref 0–5)
RBC MORPH BLD: ABNORMAL
SERVICE CMNT-IMP: ABNORMAL
SODIUM SERPL-SCNC: 136 MMOL/L (ref 136–145)
SP GR UR REFRACTOMETRY: 1.03 (ref 1–1.03)
UA: UC IF INDICATED,UAUC: ABNORMAL
UROBILINOGEN UR QL STRIP.AUTO: 2 EU/DL (ref 0.2–1)
WBC # BLD AUTO: 16.7 K/UL (ref 4.1–11.1)
WBC URNS QL MICRO: ABNORMAL /HPF (ref 0–4)

## 2018-08-16 PROCEDURE — 36415 COLL VENOUS BLD VENIPUNCTURE: CPT | Performed by: EMERGENCY MEDICINE

## 2018-08-16 PROCEDURE — 74011250636 HC RX REV CODE- 250/636: Performed by: HOSPITALIST

## 2018-08-16 PROCEDURE — 96375 TX/PRO/DX INJ NEW DRUG ADDON: CPT

## 2018-08-16 PROCEDURE — 99284 EMERGENCY DEPT VISIT MOD MDM: CPT

## 2018-08-16 PROCEDURE — 80053 COMPREHEN METABOLIC PANEL: CPT | Performed by: EMERGENCY MEDICINE

## 2018-08-16 PROCEDURE — 74011636320 HC RX REV CODE- 636/320: Performed by: EMERGENCY MEDICINE

## 2018-08-16 PROCEDURE — 96376 TX/PRO/DX INJ SAME DRUG ADON: CPT

## 2018-08-16 PROCEDURE — 65270000015 HC RM PRIVATE ONCOLOGY

## 2018-08-16 PROCEDURE — 96366 THER/PROPH/DIAG IV INF ADDON: CPT

## 2018-08-16 PROCEDURE — 81001 URINALYSIS AUTO W/SCOPE: CPT | Performed by: EMERGENCY MEDICINE

## 2018-08-16 PROCEDURE — 74011000250 HC RX REV CODE- 250: Performed by: HOSPITALIST

## 2018-08-16 PROCEDURE — 94761 N-INVAS EAR/PLS OXIMETRY MLT: CPT

## 2018-08-16 PROCEDURE — 96368 THER/DIAG CONCURRENT INF: CPT

## 2018-08-16 PROCEDURE — 83690 ASSAY OF LIPASE: CPT | Performed by: EMERGENCY MEDICINE

## 2018-08-16 PROCEDURE — 96365 THER/PROPH/DIAG IV INF INIT: CPT

## 2018-08-16 PROCEDURE — 85025 COMPLETE CBC W/AUTO DIFF WBC: CPT | Performed by: EMERGENCY MEDICINE

## 2018-08-16 PROCEDURE — 82787 IGG 1 2 3 OR 4 EACH: CPT | Performed by: INTERNAL MEDICINE

## 2018-08-16 PROCEDURE — 74011250636 HC RX REV CODE- 250/636: Performed by: EMERGENCY MEDICINE

## 2018-08-16 PROCEDURE — 94760 N-INVAS EAR/PLS OXIMETRY 1: CPT

## 2018-08-16 PROCEDURE — 74177 CT ABD & PELVIS W/CONTRAST: CPT

## 2018-08-16 PROCEDURE — 82962 GLUCOSE BLOOD TEST: CPT

## 2018-08-16 RX ORDER — MORPHINE SULFATE 2 MG/ML
4 INJECTION, SOLUTION INTRAMUSCULAR; INTRAVENOUS
Status: COMPLETED | OUTPATIENT
Start: 2018-08-16 | End: 2018-08-16

## 2018-08-16 RX ORDER — SODIUM CHLORIDE 0.9 % (FLUSH) 0.9 %
10 SYRINGE (ML) INJECTION
Status: COMPLETED | OUTPATIENT
Start: 2018-08-16 | End: 2018-08-16

## 2018-08-16 RX ORDER — ONDANSETRON 2 MG/ML
4 INJECTION INTRAMUSCULAR; INTRAVENOUS
Status: COMPLETED | OUTPATIENT
Start: 2018-08-16 | End: 2018-08-16

## 2018-08-16 RX ORDER — SODIUM CHLORIDE 0.9 % (FLUSH) 0.9 %
5-10 SYRINGE (ML) INJECTION AS NEEDED
Status: DISCONTINUED | OUTPATIENT
Start: 2018-08-16 | End: 2018-08-17

## 2018-08-16 RX ORDER — ONDANSETRON 2 MG/ML
4 INJECTION INTRAMUSCULAR; INTRAVENOUS
Status: DISCONTINUED | OUTPATIENT
Start: 2018-08-16 | End: 2018-08-18 | Stop reason: HOSPADM

## 2018-08-16 RX ORDER — METRONIDAZOLE 500 MG/100ML
500 INJECTION, SOLUTION INTRAVENOUS
Status: COMPLETED | OUTPATIENT
Start: 2018-08-16 | End: 2018-08-16

## 2018-08-16 RX ORDER — INSULIN LISPRO 100 [IU]/ML
INJECTION, SOLUTION INTRAVENOUS; SUBCUTANEOUS EVERY 6 HOURS
Status: DISCONTINUED | OUTPATIENT
Start: 2018-08-16 | End: 2018-08-18 | Stop reason: HOSPADM

## 2018-08-16 RX ORDER — MORPHINE SULFATE 4 MG/ML
1-2 INJECTION INTRAVENOUS
Status: DISCONTINUED | OUTPATIENT
Start: 2018-08-16 | End: 2018-08-18 | Stop reason: HOSPADM

## 2018-08-16 RX ORDER — MORPHINE SULFATE 2 MG/ML
1-2 INJECTION, SOLUTION INTRAMUSCULAR; INTRAVENOUS
Status: DISCONTINUED | OUTPATIENT
Start: 2018-08-16 | End: 2018-08-16

## 2018-08-16 RX ORDER — CALCIUM CARBONATE 200(500)MG
2 TABLET,CHEWABLE ORAL
COMMUNITY
End: 2019-12-02

## 2018-08-16 RX ORDER — FAMOTIDINE 10 MG/ML
20 INJECTION INTRAVENOUS EVERY 12 HOURS
Status: DISCONTINUED | OUTPATIENT
Start: 2018-08-16 | End: 2018-08-18 | Stop reason: HOSPADM

## 2018-08-16 RX ORDER — MORPHINE SULFATE 4 MG/ML
4 INJECTION INTRAVENOUS
Status: DISCONTINUED | OUTPATIENT
Start: 2018-08-16 | End: 2018-08-17 | Stop reason: DRUGHIGH

## 2018-08-16 RX ORDER — SODIUM CHLORIDE 9 MG/ML
50 INJECTION, SOLUTION INTRAVENOUS
Status: COMPLETED | OUTPATIENT
Start: 2018-08-16 | End: 2018-08-16

## 2018-08-16 RX ORDER — ACETAMINOPHEN 325 MG/1
650 TABLET ORAL
Status: DISCONTINUED | OUTPATIENT
Start: 2018-08-16 | End: 2018-08-18 | Stop reason: HOSPADM

## 2018-08-16 RX ORDER — MAGNESIUM SULFATE 100 %
4 CRYSTALS MISCELLANEOUS AS NEEDED
Status: DISCONTINUED | OUTPATIENT
Start: 2018-08-16 | End: 2018-08-18 | Stop reason: HOSPADM

## 2018-08-16 RX ORDER — NALOXONE HYDROCHLORIDE 0.4 MG/ML
0.4 INJECTION, SOLUTION INTRAMUSCULAR; INTRAVENOUS; SUBCUTANEOUS AS NEEDED
Status: DISCONTINUED | OUTPATIENT
Start: 2018-08-16 | End: 2018-08-18 | Stop reason: HOSPADM

## 2018-08-16 RX ORDER — SODIUM CHLORIDE 0.9 % (FLUSH) 0.9 %
5-10 SYRINGE (ML) INJECTION EVERY 8 HOURS
Status: DISCONTINUED | OUTPATIENT
Start: 2018-08-16 | End: 2018-08-18 | Stop reason: HOSPADM

## 2018-08-16 RX ORDER — DEXTROSE 50 % IN WATER (D50W) INTRAVENOUS SYRINGE
12.5-25 AS NEEDED
Status: DISCONTINUED | OUTPATIENT
Start: 2018-08-16 | End: 2018-08-18 | Stop reason: HOSPADM

## 2018-08-16 RX ORDER — MORPHINE SULFATE 2 MG/ML
4 INJECTION, SOLUTION INTRAMUSCULAR; INTRAVENOUS
Status: DISCONTINUED | OUTPATIENT
Start: 2018-08-16 | End: 2018-08-16

## 2018-08-16 RX ORDER — METOCLOPRAMIDE 5 MG/1
10 TABLET ORAL
COMMUNITY
End: 2018-08-31

## 2018-08-16 RX ORDER — LEVOFLOXACIN 5 MG/ML
750 INJECTION, SOLUTION INTRAVENOUS
Status: COMPLETED | OUTPATIENT
Start: 2018-08-16 | End: 2018-08-16

## 2018-08-16 RX ORDER — ENOXAPARIN SODIUM 100 MG/ML
40 INJECTION SUBCUTANEOUS EVERY 24 HOURS
Status: DISCONTINUED | OUTPATIENT
Start: 2018-08-16 | End: 2018-08-18 | Stop reason: HOSPADM

## 2018-08-16 RX ORDER — SODIUM CHLORIDE 0.9 % (FLUSH) 0.9 %
5-10 SYRINGE (ML) INJECTION AS NEEDED
Status: DISCONTINUED | OUTPATIENT
Start: 2018-08-16 | End: 2018-08-18 | Stop reason: HOSPADM

## 2018-08-16 RX ORDER — SODIUM CHLORIDE, SODIUM LACTATE, POTASSIUM CHLORIDE, CALCIUM CHLORIDE 600; 310; 30; 20 MG/100ML; MG/100ML; MG/100ML; MG/100ML
75 INJECTION, SOLUTION INTRAVENOUS CONTINUOUS
Status: DISCONTINUED | OUTPATIENT
Start: 2018-08-16 | End: 2018-08-18

## 2018-08-16 RX ADMIN — IOPAMIDOL 100 ML: 755 INJECTION, SOLUTION INTRAVENOUS at 11:11

## 2018-08-16 RX ADMIN — SODIUM CHLORIDE 50 ML/HR: 900 INJECTION, SOLUTION INTRAVENOUS at 11:11

## 2018-08-16 RX ADMIN — Medication 10 ML: at 11:11

## 2018-08-16 RX ADMIN — SODIUM CHLORIDE 1000 ML: 900 INJECTION, SOLUTION INTRAVENOUS at 10:23

## 2018-08-16 RX ADMIN — SODIUM CHLORIDE, SODIUM LACTATE, POTASSIUM CHLORIDE, AND CALCIUM CHLORIDE 1000 ML: 600; 310; 30; 20 INJECTION, SOLUTION INTRAVENOUS at 13:12

## 2018-08-16 RX ADMIN — FAMOTIDINE 20 MG: 10 INJECTION, SOLUTION INTRAVENOUS at 21:08

## 2018-08-16 RX ADMIN — METRONIDAZOLE 500 MG: 500 INJECTION, SOLUTION INTRAVENOUS at 12:04

## 2018-08-16 RX ADMIN — MORPHINE SULFATE 4 MG: 4 INJECTION INTRAVENOUS at 18:39

## 2018-08-16 RX ADMIN — SODIUM CHLORIDE, SODIUM LACTATE, POTASSIUM CHLORIDE, AND CALCIUM CHLORIDE 150 ML/HR: 600; 310; 30; 20 INJECTION, SOLUTION INTRAVENOUS at 13:52

## 2018-08-16 RX ADMIN — SODIUM CHLORIDE, SODIUM LACTATE, POTASSIUM CHLORIDE, AND CALCIUM CHLORIDE 200 ML/HR: 600; 310; 30; 20 INJECTION, SOLUTION INTRAVENOUS at 19:52

## 2018-08-16 RX ADMIN — LEVOFLOXACIN 750 MG: 5 INJECTION, SOLUTION INTRAVENOUS at 12:40

## 2018-08-16 RX ADMIN — ENOXAPARIN SODIUM 40 MG: 40 INJECTION SUBCUTANEOUS at 18:14

## 2018-08-16 RX ADMIN — Medication 10 ML: at 18:00

## 2018-08-16 RX ADMIN — MORPHINE SULFATE 4 MG: 2 INJECTION, SOLUTION INTRAMUSCULAR; INTRAVENOUS at 10:22

## 2018-08-16 RX ADMIN — MORPHINE SULFATE 4 MG: 2 INJECTION, SOLUTION INTRAMUSCULAR; INTRAVENOUS at 13:01

## 2018-08-16 RX ADMIN — ONDANSETRON 4 MG: 2 INJECTION, SOLUTION INTRAMUSCULAR; INTRAVENOUS at 10:22

## 2018-08-16 RX ADMIN — Medication 10 ML: at 21:08

## 2018-08-16 NOTE — PROGRESS NOTES
Pharmacy Clarification of Prior to Admission Medication Regimen     The patient was interviewed regarding clarification of the prior to admission medication regimen. Son, Sally Rodriguez, was present in room and obtained permission from patient to discuss drug regimen with visitor(s) present. Patient was questioned regarding use of any other inhalers, topical products, over the counter medications, herbal medications, vitamin products or ophthalmic/nasal/otic medication use. Information Obtained From: Patient, prescription bottles, RX Query    Pertinent Pharmacy Findings:   metFORMIN ER (GLUCOPHAGE XR) 500 mg tablet: Patient stated he thought this medication was 'causing my dehydration', and has not taken a dose since 18. PTA medication list was corrected to the following:     Prior to Admission Medications   Prescriptions Last Dose Informant Patient Reported? Taking? alpha-D-galactosidase (BEANO PO) 8/15/2018 at Unknown time Self Yes Yes   Sig: Take 1 Tab by mouth two (2) times a day. calcium carbonate (TUMS) 200 mg calcium (500 mg) chew 2018 at Unknown time Self Yes Yes   Sig: Take 2 Tabs by mouth three (3) times daily as needed (indigestion). cetirizine (ZYRTEC) 10 mg tablet 8/15/2018 at Unknown time Self Yes Yes   Sig: Take 10 mg by mouth nightly. cholecalciferol, vitamin D3, (VITAMIN D3 PO) 8/15/2018 at Unknown time Self Yes Yes   Sig: Take 1 Tab by mouth daily. cyanocobalamin (VITAMIN B12) 1,000 mcg/mL injection 2018 at Unknown time Self Yes Yes   Si,000 mcg by IntraMUSCular route Twice a month. On the  and    gabapentin (NEURONTIN) 300 mg capsule 8/15/2018 at Unknown time Self No Yes   Sig: Take 3 Caps by mouth two (2) times a day. 3 x 300mg caps (900mg) twice daily   metFORMIN ER (GLUCOPHAGE XR) 500 mg tablet 2018 at Unknown time Self No Yes   Sig: Take 1 Tab by mouth two (2) times a day.    metoclopramide HCl (REGLAN) 5 mg tablet 2018 at 0630 Other Yes Yes Sig: Take 10 mg by mouth three (3) times daily as needed for Nausea. ondansetron (ZOFRAN ODT) 8 mg disintegrating tablet 8/16/2018 at 0630 Other Yes Yes   Sig: Take 8 mg by mouth every eight (8) hours as needed for Nausea. pantoprazole (PROTONIX) 40 mg tablet 8/15/2018 at Unknown time Self Yes Yes   Sig: TAKE 1 BY MOUTH EVERY MORNING FOR 30 DAYS   ramipril (ALTACE) 10 mg capsule 8/15/2018 at Unknown time Self No Yes   Sig: Take 1 Cap by mouth nightly.       Facility-Administered Medications: None          Thank you,  Marjorie Byrd CPhT  Medication History Pharmacy Technician

## 2018-08-16 NOTE — ED PROVIDER NOTES
EMERGENCY DEPARTMENT HISTORY AND PHYSICAL EXAM      Date: 8/16/2018  Patient Name: Tari Guzman    History of Presenting Illness     Chief Complaint   Patient presents with    Abdominal Pain     lower abdominal pain with vomiting since last night.  Vomiting     History Provided By: Patient    HPI: Tari Guzman, 65 y.o. male with PMHx significant for HTN, arrhythmia, and adenocarcinoma (no current chemotherapy treatment), presents ambulatory to the ED with cc of sudden onset of abdominal pain alongside nausea with associated emesis since 0300 this morning. Per pt, he was awoken out of his sleep secondary to the pain this morning. Pt informs that the discomfort presents with a moderate-high intensity alongside an associated sharp, aching sensation into the lower abdomen. Pt reports that the discomfort has been constant since onset without notable modifying factors. Pt informs he has additionally experienced moderate intensity nausea with two episodes of associated emesis albeit notes chronic nausea/emesis secondary to multiple surgeries for cancer. Pt reports taking Zofran with alleviation of the nausea. Pt reports no other OTC medications or notable modifying factors for the discomfort. Pt specifically denies any fever, chills, diarrhea, urinary complications, chest pain, or SOB. PMHx: stroke, Sjogren's  PSHx: cardiac catheterization, colonoscopy   Social Hx: + EtOH; - Smoker; - Illicit Drugs    PCP: Terrance Caruso III, DO   GI: Dr. Odin Cassidy     There are no other complaints, changes, or physical findings at this time.     Current Facility-Administered Medications   Medication Dose Route Frequency Provider Last Rate Last Dose    sodium chloride (NS) flush 5-10 mL  5-10 mL IntraVENous PRN Obi Gaytan MD        levoFLOXacin [facility]) 750 mg in D5W IVPB  750 mg IntraVENous NOW Obi Gaytan  mL/hr at 08/16/18 1240 750 mg at 08/16/18 1240    morphine injection 4 mg  4 mg IntraVENous Q3H PRN Daryle Lamer, MD        lactated ringers bolus infusion 1,000 mL  1,000 mL IntraVENous ONCE Daryle Lamer, MD        lactated Ringers infusion  150 mL/hr IntraVENous CONTINUOUS Daryle Lamer, MD         Current Outpatient Prescriptions   Medication Sig Dispense Refill    metoclopramide HCl (REGLAN) 5 mg tablet Take 10 mg by mouth three (3) times daily as needed for Nausea.  alpha-D-galactosidase (BEANO PO) Take 1 Tab by mouth two (2) times a day.  calcium carbonate (TUMS) 200 mg calcium (500 mg) chew Take 2 Tabs by mouth three (3) times daily as needed (indigestion).  pantoprazole (PROTONIX) 40 mg tablet TAKE 1 BY MOUTH EVERY MORNING FOR 30 DAYS  5    metFORMIN ER (GLUCOPHAGE XR) 500 mg tablet Take 1 Tab by mouth two (2) times a day. 60 Tab 11    cholecalciferol, vitamin D3, (VITAMIN D3 PO) Take 1 Tab by mouth daily.  ondansetron (ZOFRAN ODT) 8 mg disintegrating tablet Take 8 mg by mouth every eight (8) hours as needed for Nausea.  cyanocobalamin (VITAMIN B12) 1,000 mcg/mL injection 1,000 mcg by IntraMUSCular route Twice a month. On the 1st and 15th      ramipril (ALTACE) 10 mg capsule Take 1 Cap by mouth nightly. 90 Cap 3    gabapentin (NEURONTIN) 300 mg capsule Take 3 Caps by mouth two (2) times a day. 3 x 300mg caps (900mg) twice daily 540 Cap 3    cetirizine (ZYRTEC) 10 mg tablet Take 10 mg by mouth nightly. Past History     Past Medical History:  Past Medical History:   Diagnosis Date    Arrhythmia     Previous a.fib, ablation, NSR now; NO LONGER FOLLOWED BY CARDIOLOGIST.     Autoimmune disease ([address]     SJOGREN'S    Cancer ([address]     COMMON BILE DUCT, ADENOCARCINOMA    Hypertension     Ill-defined condition     pre-diabetic - diet controlled    Sepsis ([address] 2017    STENTING TO BILE DUCT    Status post chemotherapy     Stroke [facility]) 2008    brain aneurysm - no deficits     Past Surgical History:  Past Surgical History:   Procedure Laterality Date    ABDOMEN SURGERY PROC UNLISTED  10/2017    [place]     HX GI      COLONOSCOPY, POLYPS (BENIGN)    HX GI  10/06/2017    [facility]tor [facility]     Nancy [address]  2005    Ablation     HX ORTHOPAEDIC  2000    Spinal fusion W/ HARDWARE    HX OTHER SURGICAL  11/07/2017    [place] Jamison, Dr. Matthew King  2017    PORTACATH - then removed    NEUROLOGICAL PROCEDURE UNLISTED  2005    Ting hole washout from cerebral hemorrhage     Family History:  Family History   Problem Relation Age of Onset    Cancer Father      Breast and Colon    Cancer Mother      LEUKEMIA    No Known Problems Sister     No Known Problems Brother     No Known Problems Sister     Anesth Problems Neg Hx      Social History:  Social History   Substance Use Topics    Smoking status: Never Smoker    Smokeless tobacco: Never Used    Alcohol use Yes      Comment: very rare     Allergies: Allergies   Allergen Reactions    Shellfish Derived Anaphylaxis     Soft shell crabs    Pcn [Penicillins] Other (comments)     Pt stated \"always been told PCN\"     Review of Systems   Review of Systems   Constitutional: Negative. Negative for chills and fever. HENT: Negative for congestion, facial swelling, rhinorrhea, sore throat, trouble swallowing and voice change. Eyes: Negative. Respiratory: Negative. Negative for apnea, cough, chest tightness, shortness of breath and wheezing. Cardiovascular: Negative. Negative for chest pain, palpitations and leg swelling. Gastrointestinal: Positive for abdominal pain, nausea and vomiting. Negative for abdominal distention, blood in stool, constipation and diarrhea. Endocrine: Negative. Negative for cold intolerance, heat intolerance and polyuria. Genitourinary: Negative. Negative for difficulty urinating, dysuria, flank pain, frequency, hematuria and urgency. Musculoskeletal: Negative. Negative for arthralgias, back pain, myalgias, neck pain and neck stiffness. Skin: Negative. Negative for color change and rash. Neurological: Negative. Negative for dizziness, syncope, facial asymmetry, speech difficulty, weakness, light-headedness, numbness and headaches. Hematological: Negative. Does not bruise/bleed easily. Psychiatric/Behavioral: Negative. Negative for confusion and self-injury. The patient is not nervous/anxious. Physical Exam   Physical Exam   Constitutional: He is oriented to person, place, and time. Vital signs are normal. He is cooperative. He appears toxic. He has a sickly appearance. He appears ill. He appears distressed (mild). HENT:   Head: Normocephalic and atraumatic. Mouth/Throat: Mucous membranes are normal. No posterior oropharyngeal erythema. Eyes: Conjunctivae and EOM are normal. Pupils are equal, round, and reactive to light. Neck: Normal range of motion. Cardiovascular: Normal rate, regular rhythm, normal heart sounds and intact distal pulses. Exam reveals no gallop and no friction rub. No murmur heard. Pulmonary/Chest: Effort normal and breath sounds normal. No respiratory distress. He has no wheezes. He has no rales. He exhibits no tenderness. Abdominal: Soft. Bowel sounds are normal. He exhibits no distension and no mass. There is tenderness in the right lower quadrant, periumbilical area and left lower quadrant. There is no rebound, no guarding, no tenderness at McBurney's point and negative Dhaliwal's sign. Well healed surgical scars   Musculoskeletal: Normal range of motion. He exhibits no edema, tenderness or deformity. Neurological: He is alert and oriented to person, place, and time. He displays normal reflexes. No cranial nerve deficit. He exhibits normal muscle tone. Coordination normal.   Skin: Skin is warm. No rash noted. Psychiatric: He has a normal mood and affect. Nursing note and vitals reviewed.     Diagnostic Study Results   Labs -     Recent Results (from the past 12 hour(s))   CBC WITH AUTOMATED DIFF Collection Time: 08/16/18  8:56 AM   Result Value Ref Range    WBC 16.7 (H) 4.1 - 11.1 K/uL    RBC 5.24 4.10 - 5.70 M/uL    HGB 16.1 12.1 - 17.0 g/dL    HCT 47.5 36.6 - 50.3 %    MCV 90.6 80.0 - 99.0 FL    MCH 30.7 26.0 - 34.0 PG    MCHC 33.9 30.0 - 36.5 g/dL    RDW 13.3 11.5 - 14.5 %    PLATELET 768 191 - 814 K/uL    MPV 11.2 8.9 - 12.9 FL    NRBC 0.0 0  WBC    ABSOLUTE NRBC 0.00 0.00 - 0.01 K/uL    NEUTROPHILS 91 (H) 32 - 75 %    LYMPHOCYTES 4 (L) 12 - 49 %    MONOCYTES 4 (L) 5 - 13 %    EOSINOPHILS 0 0 - 7 %    BASOPHILS 0 0 - 1 %    IMMATURE GRANULOCYTES 1 (H) 0.0 - 0.5 %    ABS. NEUTROPHILS 15.1 (H) 1.8 - 8.0 K/UL    ABS. LYMPHOCYTES 0.7 (L) 0.8 - 3.5 K/UL    ABS. MONOCYTES 0.7 0.0 - 1.0 K/UL    ABS. EOSINOPHILS 0.0 0.0 - 0.4 K/UL    ABS. BASOPHILS 0.0 0.0 - 0.1 K/UL    ABS. IMM. GRANS. 0.2 (H) 0.00 - 0.04 K/UL    DF AUTOMATED      RBC COMMENTS NORMOCYTIC, NORMOCHROMIC     METABOLIC PANEL, COMPREHENSIVE    Collection Time: 08/16/18  8:56 AM   Result Value Ref Range    Sodium 136 136 - 145 mmol/L    Potassium 4.0 3.5 - 5.1 mmol/L    Chloride 100 97 - 108 mmol/L    CO2 30 21 - 32 mmol/L    Anion gap 6 5 - 15 mmol/L    Glucose 221 (H) 65 - 100 mg/dL    BUN 18 6 - 20 MG/DL    Creatinine 0.97 0.70 - 1.30 MG/DL    BUN/Creatinine ratio 19 12 - 20      GFR est AA >60 >60 ml/min/1.73m2    GFR est non-AA >60 >60 ml/min/1.73m2    Calcium 8.9 8.5 - 10.1 MG/DL    Bilirubin, total 1.1 (H) 0.2 - 1.0 MG/DL    ALT (SGPT) 57 12 - 78 U/L    AST (SGOT) 23 15 - 37 U/L    Alk.  phosphatase 207 (H) 45 - 117 U/L    Protein, total 7.2 6.4 - 8.2 g/dL    Albumin 4.1 3.5 - 5.0 g/dL    Globulin 3.1 2.0 - 4.0 g/dL    A-G Ratio 1.3 1.1 - 2.2     URINALYSIS W/ REFLEX CULTURE    Collection Time: 08/16/18  8:56 AM   Result Value Ref Range    Color YELLOW/STRAW      Appearance CLEAR CLEAR      Specific gravity 1.027 1.003 - 1.030      pH (UA) 7.0 5.0 - 8.0      Protein NEGATIVE  NEG mg/dL    Glucose 500 (A) NEG mg/dL    Ketone 40 (A) NEG mg/dL    Bilirubin NEGATIVE  NEG      Blood NEGATIVE  NEG      Urobilinogen 2.0 (H) 0.2 - 1.0 EU/dL    Nitrites NEGATIVE  NEG      Leukocyte Esterase NEGATIVE  NEG      WBC 0-4 0 - 4 /hpf    RBC 0-5 0 - 5 /hpf    Epithelial cells FEW FEW /lpf    Bacteria NEGATIVE  NEG /hpf    UA:UC IF INDICATED CULTURE NOT INDICATED BY UA RESULT CNI     LIPASE    Collection Time: 08/16/18  8:56 AM   Result Value Ref Range    Lipase >3000 (H) 73 - 393 U/L     Radiologic Studies -   CT ABD PELV W CONT   Final Result        Ct Abd Pelv W Cont    Result Date: 8/16/2018  IMPRESSION: Acute pancreatitis. No definite of evidence of recurrence or metastatic disease. Medical Decision Making   I am the first provider for this patient. I reviewed the vital signs, available nursing notes, past medical history, past surgical history, family history and social history. Vital Signs-Reviewed the patient's vital signs. Patient Vitals for the past 12 hrs:   Temp Pulse Resp BP SpO2   08/16/18 1045 - - - 124/61 97 %   08/16/18 0852 97.8 °F (36.6 °C) 65 18 136/81 98 %     Pulse Oximetry Analysis - 98% on RA    Cardiac Monitor:   Rate: 65 bpm  Rhythm: Normal Sinus Rhythm      Records Reviewed: Nursing Notes, Old Medical Records, Previous electrocardiograms, Previous Radiology Studies and Previous Laboratory Studies    Provider Notes (Medical Decision Making):   DDX: pancreatitis, cancer, obstruction, appendicitis UTI    64 y/o M with history of cholangiocarcinoma s/p resection with acute abdominal pain. Will checks labs, CT, treat symptomatically, and likely need admission. ED Course:   Initial assessment performed. The patients presenting problems have been discussed, and they are in agreement with the care plan formulated and outlined with them. I have encouraged them to ask questions as they arise throughout their visit.     Medications   sodium chloride (NS) flush 5-10 mL (not administered)   levoFLOXacin (LEVAQUIN) 750 mg in D5W IVPB (750 mg IntraVENous New Bag 8/16/18 1240)   morphine injection 4 mg (not administered)   lactated ringers bolus infusion 1,000 mL (not administered)   lactated Ringers infusion (not administered)   ondansetron (ZOFRAN) injection 4 mg (4 mg IntraVENous Given 8/16/18 1022)   morphine injection 4 mg (4 mg IntraVENous Given 8/16/18 1022)   sodium chloride 0.9 % bolus infusion 1,000 mL (1,000 mL IntraVENous New Bag 8/16/18 1023)   sodium chloride (NS) flush 10 mL (10 mL IntraVENous Given 8/16/18 1111)   iopamidol (ISOVUE-370) 76 % injection 100 mL (100 mL IntraVENous Given 8/16/18 1111)   0.9% sodium chloride infusion (50 mL/hr IntraVENous New Bag 8/16/18 1111)   metroNIDAZOLE (FLAGYL) IVPB premix 500 mg (500 mg IntraVENous New Bag 8/16/18 1204)   morphine injection 4 mg (4 mg IntraVENous Given 8/16/18 1301)     Progress Note  11:30AM  Elevated Lipase noted; pt has received 2L IVF's, will continue aggressive IV hydration. CT results pending    Progress Note  11:45AM  Reviewed CT results with patient; continuous LR started at 200cc/hr. Awaiting GI and Hospitalist consult. CONSULT NOTE:   1:03 Eliel Bacon MD spoke with Dr. Acosta Buckner,  Specialty: Hospitalist  Discussed pt's hx, disposition, and available diagnostic and imaging results. Reviewed care plans. Consultant agrees with plans as outlined. Advises 2L fluids with a rate of 150. Written by RADHA Ellis, as dictated by Brenna Norwood MD.    Progress Note:   1:04 PM  Updated pt on all returned results and findings including hospitalist consult and likely admission. Pt in agreement with the further progression of care plan and expresses agreement with and understanding of all items discussed.   Written by RADHA Ellis, as dictated by Brenna Norwood MD.     CRITICAL CARE NOTE :    2:16 PM    IMPENDING DETERIORATION -Airway, Respiratory, Metabolic and Hepatic  ASSOCIATED RISK FACTORS - Hypotension, Shock, Hypoxia, Metabolic changes and Dehydration  MANAGEMENT- Bedside Assessment and Supervision of Care  INTERPRETATION -  Xrays, CT Scan, ECG, Blood Pressure and Cardiac Output Measures   INTERVENTIONS - hemodynamic mngmt, Metobolic interventions and management of acute pancreatitis requiring multiple IVF boluses and continuous IVF's hydration  CASE REVIEW - Hospitalist, Medical Sub-Specialist, Nursing, Family and PCP  TREATMENT RESPONSE -Stable  PERFORMED BY - Self    NOTES   :    I have spent 60 minutes of critical care time involved in lab review, consultations with specialist, family decision- making, bedside attention and documentation. During this entire length of time I was immediately available to the patient . Critical Care: The reason for providing this level of medical care for this critically ill patient was due to a critical illness that impaired one or more vital organ systems, such that there was a high probability of imminent or life threatening deterioration in the patient's condition. This care involved high complexity decision making to assess, manipulate, and support vital system functions, to treat this degree of vital organ system failure, and to prevent further life threatening deterioration of the patients condition. Barbara Siemens, MD      Disposition:  ADMIT NOTE:    1:04 PM  Patient is being admitted to the hospital by Dr. Oralia Bae. The results of their tests and reasons for their admission have been discussed with the patient and/or available family. They convey agreement and understanding for the need to be admitted and for the admission diagnosis. PLAN:  1. Admit to Hospitalist, Dr. Oralia Bae    Diagnosis     Clinical Impression:   1. Acute pancreatitis, unspecified complication status, unspecified pancreatitis type    2. Acute abdominal pain    3. Uncontrolled type 2 diabetes mellitus with hyperglycemia, without long-term current use of insulin ([facility] [address]    4. Leukocytosis, unspecified type      Attestations:   This note is prepared by Ольга Beauchamp, acting as Scribe for Sol Zarate MD.    Sol Zarate MD: The scribe's documentation has been prepared under my direction and personally reviewed by me in its entirety. I confirm that the note above accurately reflects all work, treatment, procedures, and medical decision making performed by me. This note will not be viewable in [address]

## 2018-08-16 NOTE — CONSULTS
[address] [facility]  MR#: [id number]  : 1955  ACCOUNT #: [de-identified]   DATE OF SERVICE: 2018    PRIMARY CARE PHYSICIAN:  Dennis Davis DO     REQUESTING PHYSICIAN:  Veronica Da Silva MD     PRIMARY GASTROENTEROLOGIST:  Shobha Rosenthal MD     REASON FOR CONSULTATION:  Abdominal pain with pancreatitis. HISTORY OF PRESENT ILLNESS:  This is a delightful 71-year-old gentleman with a previous history of cholangiocarcinoma, status post pylorus sparing Whipple resection, previous ERCP and stenting who presents to the hospital complaining of nausea, vomiting associated with periumbilical abdominal pain. On presentation to the ER, his white cell count was 16,000 and his lipase was over 3000. He has a chronically elevated alkaline phosphatase. The rest of the LFTs are normal and his bilirubin is currently 1.1. On admission, he had a CT scan of the abdomen and pelvis performed which revealed acute pancreatitis, status post Whipple with interval now development of worsening pancreatic duct dilatation. In the peritoneum, there was new inflammation adjacent to the duodenal surgical site. There was no CT evidence of recurrence of metastatic disease. The patient was admitted to the hospitalist service and we were asked to see this gentleman. He denies any fevers and there is no hypotension or tachycardia. Patient reportedly started metformin recently and thought that the GI distress was being caused by this. He works as a farmer and thinks that he may have \"dehydrated' himself by working too much the last few days. The patient is admitted and we were asked to see this gentleman in consultation. PAST MEDICAL HISTORY:  Regarding his jaundice history, he was seen initially in consultation on 2017 by Dr. Shobha Rosenthal for obstructive jaundice. I did an initial ERCP on him and he was found to have a 5 cm long stricture noted in the common bile duct.   His care was then transferred to Dr. Boo Parker who did a cholangioscopy with biopsies which confirmed a cholangiocarcinoma. At that point, the patient also became septic after stent placment. He then underwent a surgical resection which included a pylorus sparing Whipple by Dr. Mary Dang in 10/2017 in [facility].  It was diagnosed to be a T3 N1 lesion. The patient then was followed up with Dr. Camacho Kuhn. Since then, he has had EGDs performed by Dr. Gisselle Henley and Dr. Thomas Coley in [facility] for an anastomotic stricture at the [address] [place] [address] anastomosis with a gastric nodule/polyp. Last EGD was performed by Dr. Thomas Coley actually last month, which revealed a gastric polyp in the distal stomach, which was reviewed. PAST MEDICAL HISTORY:  Other past medical history includes arrhythmia, Sjogren syndrome, common bile duct cholangiocarcinoma, hypertension, previous history of sepsis, status post chemotherapy and previous history of brain aneurysm. PAST SURGICAL HISTORY:  As noted above with Whipple done last year. He has had colonoscopies, ERCP, EUS and an ERCP including SpyGlass cholangioscopy. He has also had Port-A-Cath placement with spinal fusion and bur hole washout from a cerebral hemorrhage. SOCIAL HISTORY:  Never been a smoker. Rare alcohol use. No history of drug use. FAMILY HISTORY:  Positive for breast and colon cancer. There is also history of leukemia in his mother. ALLERGIES:  SHELLFISH WHICH GIVES HIM ANAPHYLAXIS AND PENICILLIN. HOME MEDICATIONS:  Include Reglan, Beano, Tums, Protonix, Glucophage, vitamin D, Zofran, vitamin B, Ultrase, Neurontin, Zyrtec. REVIEW OF SYSTEMS:  A detailed 10-system is positive for nausea, vomiting, abdominal pain. Otherwise, 10-point review of systems is negative. PHYSICAL EXAMINATION:  VITAL SIGNS:  Temperature 97.8, pulse 65, blood pressure is 110/61, sats are 96%, respiratory rate of 16. GENERAL:  He is alert and awake.   I saw him in the ER with 3 family members at his bedside. Delightful gentleman in no severe distress. Mild upper abdominal pain at this point. Of note, he has been given some pain medicines. HEENT:  Extraocular muscles are intact. NECK:  Supple with no thyromegaly. LUNGS:  Clear to auscultation. HEART:  Regular rate and rhythm, normal S1, S2.  ABDOMEN:  Soft. There is mild tenderness in the right upper quadrant. His midline surgical scar is noted. The patient actually complains of pain below the belly button. EXTREMITIES:  Without edema. NEUROLOGIC:  Cranial nerves intact. Mood and affect is appropriate. Capillary refill was not checked by me. LABORATORY DATA:  White cell 16.7, H and H 16 and 47, platelets of 260. Neutrophils 91. Chem-7:  Glucose 221, otherwise normal.  Bilirubin 1.1, alkaline phosphatase 207. Otherwise, LFTs normal.  Lipase over 3000. CT scan of the abdomen and pelvis with interval development of worsening pancreatic duct dilatation with acute pancreatitis, status post Whipple, right upper quadrant inflammation adjacent to the duodenum at the surgical site extending to the portal confluence. There was some prostatic calcifications. ASSESSMENT AND PLAN:  3  A 36-year-old gentleman with history of cholangiocarcinoma diagnosed in 07/2017 underwent Whipple procedure in October of last year, followed by chemotherapy with Dr. Eloisa Costello who now presents with a first confirm acute case of pancreatitis associated with nausea and vomiting. 2.  Diabetes. 3.  Leukocytosis. 4.  Minimally elevated bilirubin. PLANS AND RECOMMENDATIONS:   1. Agree with aggressive treatment of pancreatitis with IVF replenishment. 2. Keep him N.p.o. for now. 3.Gastritis prophylaxis and deep vein thrombosis prophylaxis. 4. Metformin was recently started and I agree that this should be stopped. 5.He will be given IV pain medications and controlling his emesis. 6.Triglyceride level has been sent.    7. I will get an IgG4 level. The question is about the cause of pancreatitis. The new pancreatic duct dilatation is somewhat worrisome. I am concerned that he may be developing a stricture where his pancreas was attached to the small bowel. I am not sure if the surgery was an end-to-side or an end-to-end anastomosis. We will observe the patient very closely with you and see how he does. I will inform Dr. Carmina Hines, his primary gastroenterologist, of the patient's presence in the hospital.    He was told that he cannot get an MRI, in the past    Thank you for this consultation. ELBA GAMINO [facility] HospitalMD LINDSAY  D: 08/16/2018 17:15     T: 08/16/2018 18:02  JOB #: [id number]  CC: Julita Salinas DO  CC: Rajinder Finney MD

## 2018-08-16 NOTE — ED NOTES
Pt states that his pain is better. States still painful, but not the \"gut wrenching\" pain he was having. Family at bedside.   Waiting for CT

## 2018-08-16 NOTE — ED TRIAGE NOTES
Pt states sudden onset of lower abd pain, onset last night. States that he has n/v chronically s/p surgeries due to cancer. Denies any change in vomiting at this time. Family  At bedside.

## 2018-08-16 NOTE — IP AVS SNAPSHOT
[address] [place] [address] 
[phone] Patient: Efra Cardozo MRN: [id number] QME:4/80/0606 You are allergic to the following Allergen Reactions Shellfish Derived Anaphylaxis Soft shell crabs Pcn (Penicillins) Other (comments) Pt stated \"always been told PCN\" Recent Documentation Height Weight BMI Smoking Status 1.727 m 81.7 kg 27.39 kg/m2 Never Smoker Unresulted Labs-Please follow up with your PCP about these lab tests Order Current Status IGG SUBCLASS 4 In process Emergency Contacts  (Rel.) Home Phone Work Phone Mobile Phone [facility] [phone] -- [phone] Roberto Cristina (Child) [phone] -- -- Ridge Cristina III (Child) [phone] -- -- Christelle Cristina (Spouse) [phone] -- -- About your hospitalization You were admitted on:  August 16, 2018 You last received care in the:  [address] You were discharged on:  August 18, 2018 Why you were hospitalized Your primary diagnosis was:  Acute Pancreatitis Your diagnoses also included:  Leukocytosis, Paroxysmal Atrial Fibrillation (Hcc), Controlled Type 2 Diabetes Mellitus Without Complication, Without Long-Term Current Use Of Insulin (Hcc), Cholangiocarcinoma Determined By Biopsy Of Biliary Tract (Hcc) Providers Seen During Your Hospitalization Provider Specialty Primary office phone Blair Magallanes MD Emergency Medicine [phone] Cherise Asencio MD Internal Medicine [phone] Mindy Tobar MD Internal Medicine [phone] Your Primary Care Physician (PCP) Primary Care Physician Office Phone Office Fax Adria OGDEN [phone] Follow-up Information Follow up With Details Comments Contact Info Magdy Hannon DO In 1 week  [address] [facility] 
[phone] Vasiliy Burgess MD In 1 month  305 [facility] 202 [facility] 
[phone] Appointments for Next 14 days 8/24/2018 11:30 AM  ESTABLISHED PATIENT [facility] Medical Oncology at [facility]  
  
  
 
  
  
  
My Medications STOP taking these medications   
 metFORMIN  mg tablet Commonly known as:  GLUCOPHAGE XR  
   
  
  
TAKE these medications as instructed Instructions Each Dose to Equal  
 Morning Noon Evening Bedtime BEANO PO Your last dose was: Your next dose is: Take 1 Tab by mouth two (2) times a day. 1 Tab  
    
   
   
   
  
 calcium carbonate 200 mg calcium (500 mg) Chew Commonly known as:  TUMS Your last dose was: Your next dose is: Take 2 Tabs by mouth three (3) times daily as needed (indigestion). 2 Tab  
    
   
   
   
  
 cyanocobalamin 1,000 mcg/mL injection Commonly known as:  VITAMIN B12 Your last dose was: Your next dose is:    
   
   
 1,000 mcg by IntraMUSCular route Twice a month. On the 1st and 15th  
 1000 mcg  
    
   
   
   
  
 gabapentin 300 mg capsule Commonly known as:  NEURONTIN Your last dose was: Your next dose is: Take 900 mg by mouth two (2) times a day. 3 x 300mg BID  
 900 mg  
    
   
   
   
  
 metoclopramide HCl 5 mg tablet Commonly known as:  REGLAN Your last dose was: Your next dose is: Take 10 mg by mouth three (3) times daily as needed for Nausea. 10 mg  
    
   
   
   
  
 pantoprazole 40 mg tablet Commonly known as:  PROTONIX Your last dose was: Your next dose is: TAKE 1 BY MOUTH EVERY MORNING FOR 30 DAYS  
     
   
   
   
  
 ramipril 10 mg capsule Commonly known as:  ALTACE Your last dose was: Your next dose is: Take 1 Cap by mouth nightly. 10 mg VITAMIN D3 PO Your last dose was: Your next dose is: Take 1 Tab by mouth daily. 1 Tab ZOFRAN ODT 8 mg disintegrating tablet Generic drug:  ondansetron Your last dose was: Your next dose is: Take 8 mg by mouth every eight (8) hours as needed for Nausea. 8 mg ZyrTEC 10 mg tablet Generic drug:  cetirizine Your last dose was: Your next dose is: Take 10 mg by mouth nightly. 10 mg Discharge Instructions Patient Discharge Instructions Gordon Ivan / [id number] : 1955 Admitted 2018 Discharged: 2018 DISCHARGE DIAGNOSIS:  
 
  Acute pancreatitis Take Home Medications General drug facts If you have a very bad allergy, wear an allergy ID at all times. It is important that you take the medication exactly as they are prescribed. Keep your medication in the bottles provided by the pharmacist. 
Keep a list of all your drugs (prescription, natural products, vitamins, OTC) with you. Give this list to your doctor. Do not take other medications without consulting your doctor. Do not share your drugs with others and do not take anyone else's drugs. Keep all drugs out of the reach of children and pets. Most drugs may be thrown away in household trash after mixing with coffee grounds or greer litter and sealing in a plastic bag. Keep a list Call your doctor for help with any side effects. If in the [place] you may also call the FDA at 6-926-FDA-0024 Talk with the doctor before starting any new drug, including OTC, natural products, or vitamins. What to do at [facility] 1. Recommended diet:low fat diet 2. Recommended activity: Activity as tolerated 3.  If you experience any of the following symptoms then please call your primary care physician or return to the emergency room if you cannot get hold of your doctor: fever/chills/worsening abdominal pain 4. Bring these papers with you to your follow up appointments. The papers will help your doctors be sure to continue the care plan from the hospital. 
 
 
Follow-up with:  
PCP: Duy Whalen III, DO Follow-up Information Follow up With Details Comments Contact Info Rafael Rogers DO In 1 week  [address] [facility] 
[phone] Roly Pearson MD In 1 month  [address] [facility] 
[phone] Please call for your own appointment Information obtained by : 
I understand that if any problems occur once I am at home I am to contact my physician. I understand and acknowledge receipt of the instructions indicated above. Physician's or R.N.'s Signature                                                                  Date/Time Patient or Representative Signature                                                          Date/Time Discharge Orders None [facility] Announcement We are excited to announce that we are making your provider's discharge notes available to you in [employer]. You will see these notes when they are completed and signed by the physician that discharged you from your recent hospital stay. If you have any questions or concerns about any information you see in [employer], please call the Health Information Department where you were seen or reach out to your Primary Care Provider for more information about your plan of care. Introducing [facility]! Dear Sakina Mariano: Thank you for requesting a [employer] account. Our records indicate that you already have an active [employer] account. You can access your account anytime at [email or web address] (acquired by [employer] in 2014)/Frontback Did you know that you can access your hospital and ER discharge instructions at any time in [employer] Books? You can also review all of your test results from your hospital stay or ER visit. Additional Information If you have questions, please visit the Frequently Asked Questions section of the [employer] Books website at [email or web address] (acquired by [employer] in 2014)/[email or web address] Remember, [employer] Books is NOT to be used for urgent needs. For medical emergencies, dial 911. Now available from your iPhone and Android! General Information Please provide this summary of care documentation to your next provider. Patient Signature:  ____________________________________________________________ Date:  ____________________________________________________________  
  
[facility] Provider Signature:  ____________________________________________________________ Date:  ____________________________________________________________

## 2018-08-16 NOTE — ED NOTES
TRANSFER - OUT REPORT:    Verbal report given to Aureliano Colón RN(name) on Lokesh Ford  being transferred to 1136(unit) for routine progression of care       Report consisted of patients Situation, Background, Assessment and   Recommendations(SBAR). Information from the following report(s) SBAR was reviewed with the receiving nurse. Lines:   Venous Access Device Power [facility] Implantable Port 11/07/17 Upper chest (subclavicular area, right (Active)       Peripheral IV 08/16/18 Right Antecubital (Active)   Site Assessment Clean, dry, & intact 8/16/2018  9:03 AM   Phlebitis Assessment 0 8/16/2018  9:03 AM   Infiltration Assessment 0 8/16/2018  9:03 AM   Dressing Status Clean, dry, & intact 8/16/2018  9:03 AM   Dressing Type Transparent 8/16/2018  9:03 AM   Hub Color/Line Status Pink;Flushed 8/16/2018  9:03 AM   Alcohol Cap Used Yes 8/16/2018  9:03 AM        Opportunity for questions and clarification was provided.       Patient transported with:   [employer]

## 2018-08-17 LAB
ALBUMIN SERPL-MCNC: 2.9 G/DL (ref 3.5–5)
ALBUMIN/GLOB SERPL: 1.2 {RATIO} (ref 1.1–2.2)
ALP SERPL-CCNC: 154 U/L (ref 45–117)
ALT SERPL-CCNC: 36 U/L (ref 12–78)
ANION GAP SERPL CALC-SCNC: 7 MMOL/L (ref 5–15)
AST SERPL-CCNC: 10 U/L (ref 15–37)
BASOPHILS # BLD: 0 K/UL (ref 0–0.1)
BASOPHILS NFR BLD: 0 % (ref 0–1)
BILIRUB SERPL-MCNC: 1.1 MG/DL (ref 0.2–1)
BUN SERPL-MCNC: 10 MG/DL (ref 6–20)
BUN/CREAT SERPL: 14 (ref 12–20)
CALCIUM SERPL-MCNC: 8.3 MG/DL (ref 8.5–10.1)
CHLORIDE SERPL-SCNC: 106 MMOL/L (ref 97–108)
CO2 SERPL-SCNC: 26 MMOL/L (ref 21–32)
CREAT SERPL-MCNC: 0.74 MG/DL (ref 0.7–1.3)
DIFFERENTIAL METHOD BLD: ABNORMAL
EOSINOPHIL # BLD: 0.2 K/UL (ref 0–0.4)
EOSINOPHIL NFR BLD: 2 % (ref 0–7)
ERYTHROCYTE [DISTWIDTH] IN BLOOD BY AUTOMATED COUNT: 13.6 % (ref 11.5–14.5)
GLOBULIN SER CALC-MCNC: 2.5 G/DL (ref 2–4)
GLUCOSE BLD STRIP.AUTO-MCNC: 107 MG/DL (ref 65–100)
GLUCOSE BLD STRIP.AUTO-MCNC: 110 MG/DL (ref 65–100)
GLUCOSE BLD STRIP.AUTO-MCNC: 125 MG/DL (ref 65–100)
GLUCOSE BLD STRIP.AUTO-MCNC: 95 MG/DL (ref 65–100)
GLUCOSE SERPL-MCNC: 113 MG/DL (ref 65–100)
HCT VFR BLD AUTO: 37.4 % (ref 36.6–50.3)
HGB BLD-MCNC: 12.5 G/DL (ref 12.1–17)
IMM GRANULOCYTES # BLD: 0 K/UL (ref 0–0.04)
IMM GRANULOCYTES NFR BLD AUTO: 1 % (ref 0–0.5)
LIPASE SERPL-CCNC: 554 U/L (ref 73–393)
LYMPHOCYTES # BLD: 0.9 K/UL (ref 0.8–3.5)
LYMPHOCYTES NFR BLD: 14 % (ref 12–49)
MAGNESIUM SERPL-MCNC: 2 MG/DL (ref 1.6–2.4)
MCH RBC QN AUTO: 30.6 PG (ref 26–34)
MCHC RBC AUTO-ENTMCNC: 33.4 G/DL (ref 30–36.5)
MCV RBC AUTO: 91.7 FL (ref 80–99)
MONOCYTES # BLD: 0.5 K/UL (ref 0–1)
MONOCYTES NFR BLD: 8 % (ref 5–13)
NEUTS SEG # BLD: 4.9 K/UL (ref 1.8–8)
NEUTS SEG NFR BLD: 75 % (ref 32–75)
NRBC # BLD: 0 K/UL (ref 0–0.01)
NRBC BLD-RTO: 0 PER 100 WBC
PHOSPHATE SERPL-MCNC: 3.5 MG/DL (ref 2.6–4.7)
PLATELET # BLD AUTO: 125 K/UL (ref 150–400)
PMV BLD AUTO: 11.5 FL (ref 8.9–12.9)
POTASSIUM SERPL-SCNC: 3.7 MMOL/L (ref 3.5–5.1)
PROT SERPL-MCNC: 5.4 G/DL (ref 6.4–8.2)
RBC # BLD AUTO: 4.08 M/UL (ref 4.1–5.7)
SERVICE CMNT-IMP: ABNORMAL
SERVICE CMNT-IMP: NORMAL
SODIUM SERPL-SCNC: 139 MMOL/L (ref 136–145)
TRIGL SERPL-MCNC: 78 MG/DL (ref ?–150)
WBC # BLD AUTO: 6.5 K/UL (ref 4.1–11.1)

## 2018-08-17 PROCEDURE — 83735 ASSAY OF MAGNESIUM: CPT | Performed by: HOSPITALIST

## 2018-08-17 PROCEDURE — 74011250636 HC RX REV CODE- 250/636: Performed by: HOSPITALIST

## 2018-08-17 PROCEDURE — 80053 COMPREHEN METABOLIC PANEL: CPT | Performed by: HOSPITALIST

## 2018-08-17 PROCEDURE — 85025 COMPLETE CBC W/AUTO DIFF WBC: CPT | Performed by: HOSPITALIST

## 2018-08-17 PROCEDURE — 82962 GLUCOSE BLOOD TEST: CPT

## 2018-08-17 PROCEDURE — 36415 COLL VENOUS BLD VENIPUNCTURE: CPT | Performed by: HOSPITALIST

## 2018-08-17 PROCEDURE — 84100 ASSAY OF PHOSPHORUS: CPT | Performed by: HOSPITALIST

## 2018-08-17 PROCEDURE — 83690 ASSAY OF LIPASE: CPT | Performed by: HOSPITALIST

## 2018-08-17 PROCEDURE — 74011000250 HC RX REV CODE- 250: Performed by: HOSPITALIST

## 2018-08-17 PROCEDURE — 84478 ASSAY OF TRIGLYCERIDES: CPT | Performed by: HOSPITALIST

## 2018-08-17 PROCEDURE — 74011250637 HC RX REV CODE- 250/637: Performed by: HOSPITALIST

## 2018-08-17 PROCEDURE — 65270000015 HC RM PRIVATE ONCOLOGY

## 2018-08-17 RX ORDER — GABAPENTIN 300 MG/1
900 CAPSULE ORAL 2 TIMES DAILY
Status: DISCONTINUED | OUTPATIENT
Start: 2018-08-17 | End: 2018-08-18 | Stop reason: HOSPADM

## 2018-08-17 RX ORDER — GABAPENTIN 300 MG/1
900 CAPSULE ORAL 2 TIMES DAILY
Status: ON HOLD | COMMUNITY
End: 2019-01-25

## 2018-08-17 RX ORDER — RAMIPRIL 5 MG/1
10 CAPSULE ORAL
Status: DISCONTINUED | OUTPATIENT
Start: 2018-08-17 | End: 2018-08-18 | Stop reason: HOSPADM

## 2018-08-17 RX ADMIN — FAMOTIDINE 20 MG: 10 INJECTION, SOLUTION INTRAVENOUS at 08:31

## 2018-08-17 RX ADMIN — ENOXAPARIN SODIUM 40 MG: 40 INJECTION SUBCUTANEOUS at 17:12

## 2018-08-17 RX ADMIN — SODIUM CHLORIDE, SODIUM LACTATE, POTASSIUM CHLORIDE, AND CALCIUM CHLORIDE 200 ML/HR: 600; 310; 30; 20 INJECTION, SOLUTION INTRAVENOUS at 05:31

## 2018-08-17 RX ADMIN — MORPHINE SULFATE 2 MG: 4 INJECTION INTRAVENOUS at 00:36

## 2018-08-17 RX ADMIN — SODIUM CHLORIDE, SODIUM LACTATE, POTASSIUM CHLORIDE, AND CALCIUM CHLORIDE 200 ML/HR: 600; 310; 30; 20 INJECTION, SOLUTION INTRAVENOUS at 00:36

## 2018-08-17 RX ADMIN — GABAPENTIN 900 MG: 300 CAPSULE ORAL at 17:11

## 2018-08-17 RX ADMIN — FAMOTIDINE 20 MG: 10 INJECTION, SOLUTION INTRAVENOUS at 21:14

## 2018-08-17 RX ADMIN — SODIUM CHLORIDE, SODIUM LACTATE, POTASSIUM CHLORIDE, AND CALCIUM CHLORIDE 75 ML/HR: 600; 310; 30; 20 INJECTION, SOLUTION INTRAVENOUS at 14:30

## 2018-08-17 RX ADMIN — GABAPENTIN 900 MG: 300 CAPSULE ORAL at 08:31

## 2018-08-17 RX ADMIN — Medication 10 ML: at 06:32

## 2018-08-17 RX ADMIN — Medication 10 ML: at 21:14

## 2018-08-17 RX ADMIN — RAMIPRIL 10 MG: 5 CAPSULE ORAL at 21:13

## 2018-08-17 NOTE — PROGRESS NOTES
Bedside and Verbal shift change report given to Karey Javier RN (oncoming nurse) by Dorita Wayne RN (offgoing nurse).  Report included the following information SBAR, Kardex, Intake/Output, MAR and Recent Results.

## 2018-08-17 NOTE — PROGRESS NOTES
F/U for acute pancreatitis    S: Mr. Blair New was seen by me today during rounds. At this time, he is resting + comfortably. Pain is gone. The patient has no other new complaints today. Please see admission consult for details of ROS; there are no changes today. O: Blood pressure 114/66, pulse 66, temperature 97.8 °F (36.6 °C), resp. rate 20, height 5' 8\" (1.727 m), weight 81.7 kg (180 lb 1.9 oz), SpO2 94 %. Gen: Patient is in no acute distress. There is no jaundice. Lungs: Clear to auscultation bilaterally . Heart:+RRR. Abd: Soft, non tender, non-distended, bowel sounds present. Extremities: Warm. Lab Results   Component Value Date/Time    Lipase 554 (H) 08/17/2018 04:22 AM     Lab Results   Component Value Date/Time    WBC 6.5 08/17/2018 04:22 AM    Hemoglobin (POC) 10.3 (L) 10/06/2017 01:14 PM    HGB 12.5 08/17/2018 04:22 AM    HCT 37.4 08/17/2018 04:22 AM    PLATELET 523 (L) 20/26/1490 04:22 AM    MCV 91.7 08/17/2018 04:22 AM     Lab Results   Component Value Date/Time    Sodium 139 08/17/2018 04:22 AM    Potassium 3.7 08/17/2018 04:22 AM    Chloride 106 08/17/2018 04:22 AM    CO2 26 08/17/2018 04:22 AM    Anion gap 7 08/17/2018 04:22 AM    Glucose 113 (H) 08/17/2018 04:22 AM    BUN 10 08/17/2018 04:22 AM    Creatinine 0.74 08/17/2018 04:22 AM    BUN/Creatinine ratio 14 08/17/2018 04:22 AM    GFR est AA >60 08/17/2018 04:22 AM    GFR est non-AA >60 08/17/2018 04:22 AM    Calcium 8.3 (L) 08/17/2018 04:22 AM    Bilirubin, total 1.1 (H) 08/17/2018 04:22 AM    AST (SGOT) 10 (L) 08/17/2018 04:22 AM    Alk. phosphatase 154 (H) 08/17/2018 04:22 AM    Protein, total 5.4 (L) 08/17/2018 04:22 AM    Albumin 2.9 (L) 08/17/2018 04:22 AM    Globulin 2.5 08/17/2018 04:22 AM    A-G Ratio 1.2 08/17/2018 04:22 AM    ALT (SGPT) 36 08/17/2018 04:22 AM      Cross sectional imaging:  Ct yesterday:  IMPRESSION  IMPRESSION:  Acute pancreatitis.  No definite of evidence of recurrence or metastatic disease.     A: Principal Problem:    Acute pancreatitis (8/16/2018)    Active Problems:    Cholangiocarcinoma determined by biopsy of biliary tract ([facility] (10/6/2017)      Paroxysmal atrial fibrillation (HCC) (10/26/2017)      Controlled type 2 diabetes mellitus without complication, without long-term current use of insulin ([facility] (7/30/2018)      Leukocytosis (8/16/2018)        Comment:  He is doing well  P: clear liq diet  Low fat diet tomorrow if he does well  If he has issues will consider mrcp, ercp to look at pancreatic duct (look for stenosis as a cause of the pancreatitis)  Obtain igG subclass level result  Call partners to see 8.18 and 8.19 while here    Ashok Torres MD  7:15 AM  8/17/2018

## 2018-08-17 NOTE — ROUTINE PROCESS
Oncology Nursing Communication Tool  6:56 AM  8/17/2018     Bedside shift change report given to Kirill Shook RN (incoming nurse) by Alejandra Andres RN (outgoing nurse) on [facility]. Report included the following information SBAR, Kardex, Intake/Output, MAR and Recent Results. Shift Summary: pt med x1 for pain overnight. MD in this am & advanced pt to CL diet. SR with PVCs on tele. Labs improved. Issues for physician to address: none         Oncology Shift Note   Admission Date 8/16/2018   Admission Diagnosis Acute pancreatitis  Leukocytosis   Code Status Full Code   Consults IP CONSULT TO GASTROENTEROLOGY      Cardiac Monitoring [x] Yes [] No      Purposeful Hourly Rounding [x] Yes    Rosalia Score Total Score: 1   Rosalia score 3 or > [] Bed Alarm [] Avasys [] 1:1 sitter [] Patient refused (Place signed refusal form in chart)      Pain Managed [x] Yes [] No    Key Pain Meds     The patient is on no pain meds. Influenza Vaccine Received Flu Vaccine for Current Season (usually Sept-March): Not Flu Season           Oxygen needs? [x] Room air Oxygen @  []1L    []2L    []3L   []4L    []5L   []6L     Use home O2? [] Yes [x] No  Perform O2 challenge test using  smartphrase (.oxygenchallenge)      Last bowel movement Last Bowel Movement Date: 08/15/18  bowel movement      Urinary Catheter             LDAs             Venous Access Device Power [facility] Implantable Port 11/07/17 Upper chest (subclavicular area, right (Active)      Peripheral IV 08/16/18 Right Antecubital (Active)   Site Assessment Clean, dry, & intact 8/17/2018 12:36 AM   Phlebitis Assessment 0 8/17/2018 12:36 AM   Infiltration Assessment 0 8/17/2018 12:36 AM   Dressing Status Clean, dry, & intact 8/17/2018 12:36 AM   Dressing Type Tape;Transparent 8/17/2018 12:36 AM   Hub Color/Line Status Pink; Infusing 8/17/2018 12:36 AM   Alcohol Cap Used Yes 8/16/2018  9:03 AM                         Readmission Risk Assessment Tool Score Medium Risk            16       Total Score        3 Has Seen PCP in Last 6 Months (Yes=3, No=0)    4 IP Visits Last 12 Months (1-3=4, 4=9, >4=11)    9 Charlson Comorbidity Score (Age + Comorbid Conditions)        Criteria that do not apply:    . Living with Significant Other. Assisted Living. LTAC. SNF. or   Rehab    Patient Length of Stay (>5 days = 3)    Pt.  Coverage (Medicare=5 , Medicaid, or Self-Pay=4)       Expected Length of Stay - - -   Actual Length of Stay 1          Edgardo Angulo RN

## 2018-08-17 NOTE — H&P
Hospitalist Admission Note    NAME: Jairo Frederick   :  1955   MRN:  [id number]     Date/Time:  2018 3:38 PM    Patient PCP: Tram Pike DO   GI:  Dr. Soco Estrada  Oncology:  Dr. Chiquita Cabrera  Cardiologist:  none  ______________________________________________________________________   Assessment & Plan:  Acute pancreatitis, POA  Hx cholangiocarcinoma, POA dx , s/p Whipple surgery 10/17 by Dr. Yeimy Malone followed by chemotherapy, finished 3 months ago  --CT with new inflammation in RUQ adjacent to duodenum and surgical site with worsened pancreatic duct dilatation. Lipase >3000. --Hopefully, this does not portend recurrence of cholangiocarcinoma. No evidence of definite recurrence seen on CT but unclear etiology for pancreatitis. Started on metformin 2 weeks ago and been having increased GI upset since but metformin not associated with pancreatitis. --npo, IVF given 2L bolus with change to LR 200ml/hr. GI consult  --IV morphine prn, zofran prn  --check triglyceride, was 111 a year ago. DM type 2  A1c 6.5   --hold metformin to npo and IV dye. Given his GI malignancy and chronic problem with n/v, metformin is probably not good choice for him. Can consider glipizide. --cover with moderate SSI    HTN  --hold ramipril. Been on ACE-I for years per patient    Hx intracerebral hemorrhage due to aneurysm and while on coumadin s/p ender holes 10 years ago  Hx afib s/p ablation 10 years ago. Had postop p. afib 10/17 after Whipple, not on anticoagulation  Sjogren's disease    Body mass index is 27.39 kg/(m^2). Code: full  DVT prophylaxis: lovenox  Surrogate decision maker:  Wife Zora Quintana (nurse), currently away. Emergency contacts elsa Chavez and Avelino Au        Subjective:   CHIEF COMPLAINT:  N/v, periumbilical abdominal pain    HISTORY OF PRESENT ILLNESS:     Jairo Frederick is a 65 y.o.    male with PMH HTN, DM type 2 started on metformin 2 weeks ago, hx cerebral hemorrhage due to aneurysm while on coumadin, hx p. 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 Patient underwent Whipple surgery 10/2017 for cholangiocarcinoma. Finished chemotherapy 3 months ago. Since surgery, has frequent intermittent episodes nausea and vomiting whether it be once a week or up to 3-4x a week. Was started on metformin 2 weeks ago for diabetes and experience increased nausea and vomiting, along with intermittent abdominal pain. He stopped taking metformin on 8/13. No hx of pancreatitis. No fever, chills, diarrhea, constipation, CP, SOB. We were asked to admit for work up and evaluation of the above problems. Past Medical History:   Diagnosis Date    Arrhythmia     Previous a.fib, ablation, NSR now; NO LONGER FOLLOWED BY CARDIOLOGIST.     Autoimmune disease ([facility] [address]     SJOGREN'S    Cancer ([facility] [address]     COMMON BILE DUCT, ADENOCARCINOMA    Hypertension     Ill-defined condition     pre-diabetic - diet controlled    Sepsis ([facility] [address] 2017    STENTING TO BILE DUCT    Status post chemotherapy     Stroke [facility]) 2008    brain aneurysm - no deficits      Past Surgical History:   Procedure Laterality Date    ABDOMEN SURGERY PROC UNLISTED  10/2017    Whipple     HX GI      COLONOSCOPY, POLYPS (BENIGN)    HX GI  10/06/2017    Whipple [facility]     [address]  [address]    Ablation     HX ORTHOPAEDIC  2000    Spinal fusion W/ HARDWARE    HX OTHER SURGICAL  11/07/2017    [place] [facility], Dr. 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 Authorizing Provider   metoclopramide HCl (REGLAN) 5 mg tablet Take 10 mg by mouth three (3) times daily as needed for Nausea. Yes Flynn Mohan MD   alpha-D-galactosidase (BEANO PO) Take 1 Tab by mouth two (2) times a day. Yes Flynn Mohan MD   calcium carbonate (TUMS) 200 mg calcium (500 mg) chew Take 2 Tabs by mouth three (3) times daily as needed (indigestion). Yes Flynn Mohan MD   pantoprazole (PROTONIX) 40 mg tablet TAKE 1 BY MOUTH EVERY MORNING FOR 30 DAYS 7/22/18  Yes Historical Provider   metFORMIN ER (GLUCOPHAGE XR) 500 mg tablet Take 1 Tab by mouth two (2) times a day. 7/30/18  Yes Silas Raya III, DO   cholecalciferol, vitamin D3, (VITAMIN D3 PO) Take 1 Tab by mouth daily. Yes Historical Provider   ondansetron (ZOFRAN ODT) 8 mg disintegrating tablet Take 8 mg by mouth every eight (8) hours as needed for Nausea. Yes Historical Provider   cyanocobalamin (VITAMIN B12) 1,000 mcg/mL injection 1,000 mcg by IntraMUSCular route Twice a month. On the 1st and 15th   Yes Historical Provider   ramipril (ALTACE) 10 mg capsule Take 1 Cap by mouth nightly. 10/26/17  Yes Silas Raya III DO   gabapentin (NEURONTIN) 300 mg capsule Take 3 Caps by mouth two (2) times a day. 3 x 300mg caps (900mg) twice daily 10/26/17  Yes Silas Raya III DO   cetirizine (ZYRTEC) 10 mg tablet Take 10 mg by mouth nightly. Yes Historical Provider     REVIEW OF SYSTEMS:  POSITIVE= Bold.   Negative = normal text  General:  fever, chills, sweats, generalized weakness, weight loss/gain, loss of appetite  Eyes:  blurred vision, eye pain, loss of vision, diplopia  Ear Nose and Throat:  rhinorrhea, pharyngitis  Respiratory:   cough, sputum production, SOB, wheezing, VIVEROS, pleuritic pain  Cardiology:  chest pain, palpitations, orthopnea, PND, edema, syncope   Gastrointestinal:  abdominal pain, N/V, dysphagia, diarrhea, constipation, bleeding  Genitourinary:  frequency, urgency, dysuria, hematuria, incontinence  Muskuloskeletal :  arthralgia, myalgia  Hematology:  easy bruising, bleeding, lymphadenopathy  Dermatological:  rash, ulceration, pruritis  Endocrine:  hot flashes or polydipsia  Neurological:  headache, dizziness, confusion, focal weakness, paresthesia, memory loss, gait disturbance  Psychological: anxiety, depression, agitation      Objective:   VITALS:    Visit Vitals    /61    Pulse 65    Temp 97.8 °F (36.6 °C)    Resp 18    Ht 5' 8\" (1.727 m)    Wt 81.7 kg (180 lb 1.9 oz)    SpO2 96%    BMI 27.39 kg/m2     Temp (24hrs), Av.8 °F (36.6 °C), Min:97.8 °F (36.6 °C), Max:97.8 °F (36.6 °C)    Body mass index is 27.39 kg/(m^2). PHYSICAL EXAM:    General:    Alert, pleasant well nourish,cooperative, no distress, appears    stated age. HEENT: Atraumatic, anicteric sclerae, pink conjunctivae      No oral ulcers, mucosa dry, throat clear. Hearing intact. Neck:  Supple, symmetrical,  thyroid: non tender  Lungs:   Clear to auscultation bilaterally. No Wheezing or Rhonchi. No rales. Chest wall:  No tenderness  No Accessory muscle use. Heart:   Regular  rhythm,  No  murmur   No gallop. No edema. Abdomen:   Soft, moderate pain in RUQ and hypogastrium without guarding or rebound. Not distended. Active bowel sounds. No masses. Well healed scar  Extremities: No cyanosis. No clubbing  Skin:     Not pale Not Jaundiced  No rashes. Numerous strawberry color round   maculopapules on abdominal wall  Psych:  Good insight. Not depressed. Not anxious or agitated. Neurologic: EOMs intact. No facial asymmetry. No aphasia or slurred speech. Symmetrical strength, Alert and oriented X 3. Peripheral pulse: Bilateral, DP, 2+  Capillary refill:  normal    IMAGING RESULTS:   []       I have personally reviewed the actual   []     CXR  []     CT scan  CXR:  CT abdomen/pelvis: FINDINGS:   LUNG BASES: Clear. INCIDENTALLY IMAGED HEART AND MEDIASTINUM: Unremarkable. LIVER: Pneumobilia. GALLBLADDER: Surgically removed. SPLEEN: No mass. PANCREAS: Status post Whipple. Interval development of worsening pancreatic duct  dilatation. ADRENALS: Unremarkable. KIDNEYS: Left nephrolithiasis. STOMACH: Unremarkable. SMALL BOWEL: No dilatation or wall thickening. COLON: No dilatation or wall thickening. APPENDIX: Unremarkable. PERITONEUM: New inflammation in the right upper quadrant adjacent to the  duodenum and surgical site, extending to the portal confluence. The main  peritoneal lymph nodes are not significantly changed. RETROPERITONEUM: No lymphadenopathy or aortic aneurysm. REPRODUCTIVE ORGANS: Prostatic calcifications. URINARY BLADDER: No mass or calculus. BONES: No destructive bone lesion. ADDITIONAL COMMENTS: N/A     IMPRESSION  IMPRESSION:  Acute pancreatitis. No definite of evidence of recurrence or metastatic disease.   EKG:   ________________________________________________________________________  Care Plan discussed with:    Comments   Patient y    Family  y 3 sons at bedside   RN     Care Manager                    Consultant:      ________________________________________________________________________  Prophylaxis:  GI pepcid   DVT lovenox   ________________________________________________________________________  Recommended Disposition:   Home with Family y   HH/PT/OT/RN    SNF/LTC    MARÍA ELENA    ________________________________________________________________________  Code Status:  Full Code y   DNR/DNI ________________________________________________________________________  TOTAL TIME:  60 minutes      Comments    y Reviewed previous records       ______________________________________________________________________  Emilia Waters MD      Procedures: see electronic medical records for all procedures/Xrays and details which were not copied into this note but were reviewed prior to creation of Plan. LAB DATA REVIEWED:    Recent Results (from the past 24 hour(s))   CBC WITH AUTOMATED DIFF    Collection Time: 08/16/18  8:56 AM   Result Value Ref Range    WBC 16.7 (H) 4.1 - 11.1 K/uL    RBC 5.24 4.10 - 5.70 M/uL    HGB 16.1 12.1 - 17.0 g/dL    HCT 47.5 36.6 - 50.3 %    MCV 90.6 80.0 - 99.0 FL    MCH 30.7 26.0 - 34.0 PG    MCHC 33.9 30.0 - 36.5 g/dL    RDW 13.3 11.5 - 14.5 %    PLATELET 886 960 - 071 K/uL    MPV 11.2 8.9 - 12.9 FL    NRBC 0.0 0  WBC    ABSOLUTE NRBC 0.00 0.00 - 0.01 K/uL    NEUTROPHILS 91 (H) 32 - 75 %    LYMPHOCYTES 4 (L) 12 - 49 %    MONOCYTES 4 (L) 5 - 13 %    EOSINOPHILS 0 0 - 7 %    BASOPHILS 0 0 - 1 %    IMMATURE GRANULOCYTES 1 (H) 0.0 - 0.5 %    ABS. NEUTROPHILS 15.1 (H) 1.8 - 8.0 K/UL    ABS. LYMPHOCYTES 0.7 (L) 0.8 - 3.5 K/UL    ABS. MONOCYTES 0.7 0.0 - 1.0 K/UL    ABS. EOSINOPHILS 0.0 0.0 - 0.4 K/UL    ABS. BASOPHILS 0.0 0.0 - 0.1 K/UL    ABS. IMM.  GRANS. 0.2 (H) 0.00 - 0.04 K/UL    DF AUTOMATED      RBC COMMENTS NORMOCYTIC, NORMOCHROMIC     METABOLIC PANEL, COMPREHENSIVE    Collection Time: 08/16/18  8:56 AM   Result Value Ref Range    Sodium 136 136 - 145 mmol/L    Potassium 4.0 3.5 - 5.1 mmol/L    Chloride 100 97 - 108 mmol/L    CO2 30 21 - 32 mmol/L    Anion gap 6 5 - 15 mmol/L    Glucose 221 (H) 65 - 100 mg/dL    BUN 18 6 - 20 MG/DL    Creatinine 0.97 0.70 - 1.30 MG/DL    BUN/Creatinine ratio 19 12 - 20      GFR est AA >60 >60 ml/min/1.73m2    GFR est non-AA >60 >60 ml/min/1.73m2    Calcium 8.9 8.5 - 10.1 MG/DL    Bilirubin, total 1.1 (H) 0.2 - 1.0 MG/DL    ALT (SGPT) 57 12 - 78 U/L    AST (SGOT) 23 15 - 37 U/L    Alk.  phosphatase 207 (H) 45 - 117 U/L    Protein, total 7.2 6.4 - 8.2 g/dL    Albumin 4.1 3.5 - 5.0 g/dL    Globulin 3.1 2.0 - 4.0 g/dL    A-G Ratio 1.3 1.1 - 2.2     URINALYSIS W/ REFLEX CULTURE    Collection Time: 08/16/18  8:56 AM   Result Value Ref Range    Color YELLOW/STRAW      Appearance CLEAR CLEAR      Specific gravity 1.027 1.003 - 1.030      pH (UA) 7.0 5.0 - 8.0      Protein NEGATIVE  NEG mg/dL    Glucose 500 (A) NEG mg/dL    Ketone 40 (A) NEG mg/dL    Bilirubin NEGATIVE  NEG      Blood NEGATIVE  NEG      Urobilinogen 2.0 (H) 0.2 - 1.0 EU/dL    Nitrites NEGATIVE  NEG      Leukocyte Esterase NEGATIVE  NEG      WBC 0-4 0 - 4 /hpf    RBC 0-5 0 - 5 /hpf    Epithelial cells FEW FEW /lpf    Bacteria NEGATIVE  NEG /hpf    UA:UC IF INDICATED CULTURE NOT INDICATED BY UA RESULT CNI     LIPASE    Collection Time: 08/16/18  8:56 AM   Result Value Ref Range    Lipase >3000 (H) 73 - 393 U/L

## 2018-08-17 NOTE — ROUTINE PROCESS
Bedside and Verbal shift change report given to [address]) by Karon Freeman nurse). Report included the following information SBAR, Kardex, Recent Results, Med Rec Status and Cardiac Rhythm SR.         Significant changes during shift:  Hydration and pain control    Patient Information      65 yr old, admitted on 8/16/18, patient of Dr. Madan Alcala, admitted from home.      Problem List      Past Medical History:   Diagnosis Date    Arrhythmia       Previous a.fib, ablation, NSR now; NO LONGER FOLLOWED BY CARDIOLOGIST.     Autoimmune disease ([address]       SJOGREN'S    Cancer ([facility] [address]       COMMON BILE DUCT, ADENOCARCINOMA    Hypertension      Ill-defined condition       pre-diabetic - diet controlled    Sepsis ([facility] [address] 2017     STENTING TO BILE DUCT    Status post chemotherapy      Stroke [facility]) 2008     brain aneurysm - no deficits            Past Surgical History:   Procedure Laterality Date    ABDOMEN SURGERY PROC UNLISTED   10/2017     Whipple     HX GI         COLONOSCOPY, POLYPS (BENIGN)    HX GI   10/06/2017     Viktor Bee [facility]     [address] [address] [facility] [address] [facility] [address]   [address]     Ablation     HX ORTHOPAEDIC   2000     Spinal fusion W/ HARDWARE    HX OTHER SURGICAL   11/07/2017     [place] Cath, Dr. 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[place]            Family History   Problem Relation Age of Onset    Cancer Father         Breast and Colon    Cancer Mother         LEUKEMIA    No Known Problems Sister      No Known Problems Brother      No Known Problems Sister      Anesth Problems Neg Hx              Allergies   Allergen Reactions    Shellfish Derived Anaphylaxis       Soft shell crabs    Pcn [Penicillins] Other (comments)       Pt stated \"always been told PCN\"           Core Measures:      CVA: No  CHF: No  PNA: No          Activity Status:      OOB to Chair:  No  Ambulated this shift No  Bed Rest No      Supplemental I6: (CREWS Applicable)      Room air          LINES AND DRAINS:      PIV      DVT prophylaxis:      DVT prophylaxis Med- Yes  DVT prophylaxis SCD or PIYUSH- No       Wounds: (If Applicable)      Wounds- No    Location      Patient Safety:      Falls Score Total Score:  1  Safety Level_______  Bed Alarm On? No  Sitter?  No      Plan for upcoming shift: NPO, pain management        Discharge Plan: TBD      Active Consults:  GI

## 2018-08-17 NOTE — PROGRESS NOTES
Hospitalist Progress Note    NAME: Brunilda Temple   :  1955   MRN:  [id number]       Assessment / Plan:  Acute pancreatitis, POA  Hx cholangiocarcinoma, POA dx , s/p Whipple surgery 10/17 by Dr. Fronie Kayser followed by chemotherapy, finished 3 months ago  --clinically doing better and lipase is down to 500; TG 78  Leukocytosis is down, was stress related    --diet: agree with trial of CLD , advance as tolerating   --IVF   --cannot have MRCP due to metal plate in his back   --GI help appreciated: if not improving or recur will need ERCP  --stop Metformin   --CT with new inflammation in RUQ adjacent to duodenum and surgical site with worsened pancreatic duct dilatation. Lipase >3000.       DM type 2    --BS stable  -DC Metformin   A1c 6.5  --> can be treated with diet alone   --cover with moderate SSI     HTN  --BP stable  -cont home Ramipril      Hx intracerebral hemorrhage due to aneurysm and while on coumadin s/p ender holes 10 years ago  Hx afib s/p ablation 10 years ago. Had postop p. afib 10/17 after Whipple, not on anticoagulation  Sjogren's disease          Code status: Full   Surrogate decision maker:  Wife Yolie Castro (nurse), currently away. Emergency contacts sons Yusef Mooney and Benjy Santos  Prophylaxis: Lovenox  Recommended Disposition: Home w/Family, hopefully soon 1-2 days pending clinical progress      Subjective:     Chief Complaint / Reason for Physician Visit: following pancreatitis   Feeling much better   abd pain improved  No N/V     Discussed with RN events overnight. Review of Systems:  Symptom Y/N Comments  Symptom Y/N Comments   Fever/Chills    Chest Pain     Poor Appetite    Edema     Cough    Abdominal Pain y Much better    Sputum    Joint Pain     SOB/VIVEROS    Pruritis/Rash     Nausea/vomit n   Tolerating PT/OT     Diarrhea    Tolerating Diet     Constipation    Other       Could NOT obtain due to:      Objective:     VITALS:   Last 24hrs VS reviewed since prior progress note. Most recent are:  Patient Vitals for the past 24 hrs:   Temp Pulse Resp BP SpO2   08/17/18 0415 97.8 °F (36.6 °C) 66 20 114/66 94 %   08/17/18 0029 98.5 °F (36.9 °C) 69 20 113/64 95 %   08/16/18 1801 98.2 °F (36.8 °C) 64 18 130/74 97 %   08/16/18 1715 97.8 °F (36.6 °C) 68 18 112/58 97 %   08/16/18 1700 - - - 122/52 97 %   08/16/18 1645 - - - 117/59 98 %   08/16/18 1630 - - - 114/55 97 %   08/16/18 1615 - - - 109/61 97 %   08/16/18 1600 - - - 127/77 98 %   08/16/18 1545 - - - 120/70 97 %   08/16/18 1530 - - - 119/70 97 %   08/16/18 1515 - - - 115/71 95 %   08/16/18 1500 - - - 117/67 93 %   08/16/18 1445 - - - 110/61 96 %   08/16/18 1430 - - - 118/69 97 %   08/16/18 1415 - - - 121/65 96 %   08/16/18 1400 - - - 119/67 99 %   08/16/18 1345 - - - 120/73 97 %   08/16/18 1245 - - - 117/67 98 %   08/16/18 1145 - - - 126/72 97 %   08/16/18 1045 - - - 124/61 97 %   08/16/18 0852 97.8 °F (36.6 °C) 65 18 136/81 98 %       Intake/Output Summary (Last 24 hours) at 08/17/18 0745  Last data filed at 08/17/18 0000   Gross per 24 hour   Intake         60735.16 ml   Output                0 ml   Net         54215.16 ml        PHYSICAL EXAM:  General: WD, WN. Alert, cooperative, no acute distress    EENT:  EOMI. Anicteric sclerae. MMM  Resp:  CTA bilaterally, no wheezing or rales. No accessory muscle use  CV:  Regular  rhythm,  No edema  GI:  Soft, Non distended, + very mild epigastric tender.  +Bowel sounds  Neurologic:  Alert and oriented X 3, normal speech,   Psych:   Good insight. Not anxious nor agitated  Skin:  No rashes.   No jaundice    Reviewed most current lab test results and cultures  YES  Reviewed most current radiology test results   YES  Review and summation of old records today    NO  Reviewed patient's current orders and MAR    YES  PMH/SH reviewed - no change compared to H&P  ________________________________________________________________________  Care Plan discussed with:    Comments   Patient y    Family      RN y Care Manager     Consultant                        Multidiciplinary team rounds were held today with , nursing, pharmacist and clinical coordinator. Patient's plan of care was discussed; medications were reviewed and discharge planning was addressed. ________________________________________________________________________  Total NON critical care TIME:  35  Minutes    Total CRITICAL CARE TIME Spent:   Minutes non procedure based      Comments   >50% of visit spent in counseling and coordination of care     ________________________________________________________________________  Erik Christianson MD     Procedures: see electronic medical records for all procedures/Xrays and details which were not copied into this note but were reviewed prior to creation of Plan. LABS:  I reviewed today's most current labs and imaging studies.   Pertinent labs include:  Recent Labs      08/17/18   0422  08/16/18   0856   WBC  6.5  16.7*   HGB  12.5  16.1   HCT  37.4  47.5   PLT  125*  160     Recent Labs      08/17/18   0422  08/16/18   0856   NA  139  136   K  3.7  4.0   CL  106  100   CO2  26  30   GLU  113*  221*   BUN  10  18   CREA  0.74  0.97   CA  8.3*  8.9   MG  2.0   --    PHOS  3.5   --    ALB  2.9*  4.1   TBILI  1.1*  1.1*   SGOT  10*  23   ALT  36  57       Signed: Erik Christianson MD

## 2018-08-18 VITALS
DIASTOLIC BLOOD PRESSURE: 65 MMHG | WEIGHT: 180.12 LBS | HEIGHT: 68 IN | SYSTOLIC BLOOD PRESSURE: 114 MMHG | RESPIRATION RATE: 18 BRPM | TEMPERATURE: 98.2 F | BODY MASS INDEX: 27.3 KG/M2 | HEART RATE: 62 BPM | OXYGEN SATURATION: 96 %

## 2018-08-18 LAB
ALBUMIN SERPL-MCNC: 3.1 G/DL (ref 3.5–5)
ALBUMIN/GLOB SERPL: 1.1 {RATIO} (ref 1.1–2.2)
ALP SERPL-CCNC: 161 U/L (ref 45–117)
ALT SERPL-CCNC: 34 U/L (ref 12–78)
ANION GAP SERPL CALC-SCNC: 6 MMOL/L (ref 5–15)
AST SERPL-CCNC: 10 U/L (ref 15–37)
BILIRUB SERPL-MCNC: 0.9 MG/DL (ref 0.2–1)
BUN SERPL-MCNC: 10 MG/DL (ref 6–20)
BUN/CREAT SERPL: 13 (ref 12–20)
CALCIUM SERPL-MCNC: 8.7 MG/DL (ref 8.5–10.1)
CHLORIDE SERPL-SCNC: 105 MMOL/L (ref 97–108)
CO2 SERPL-SCNC: 28 MMOL/L (ref 21–32)
CREAT SERPL-MCNC: 0.77 MG/DL (ref 0.7–1.3)
GLOBULIN SER CALC-MCNC: 2.9 G/DL (ref 2–4)
GLUCOSE BLD STRIP.AUTO-MCNC: 100 MG/DL (ref 65–100)
GLUCOSE BLD STRIP.AUTO-MCNC: 123 MG/DL (ref 65–100)
GLUCOSE BLD STRIP.AUTO-MCNC: 183 MG/DL (ref 65–100)
GLUCOSE SERPL-MCNC: 118 MG/DL (ref 65–100)
LIPASE SERPL-CCNC: 171 U/L (ref 73–393)
MAGNESIUM SERPL-MCNC: 2.3 MG/DL (ref 1.6–2.4)
PHOSPHATE SERPL-MCNC: 4 MG/DL (ref 2.6–4.7)
POTASSIUM SERPL-SCNC: 3.7 MMOL/L (ref 3.5–5.1)
PROT SERPL-MCNC: 6 G/DL (ref 6.4–8.2)
SERVICE CMNT-IMP: ABNORMAL
SERVICE CMNT-IMP: ABNORMAL
SERVICE CMNT-IMP: NORMAL
SODIUM SERPL-SCNC: 139 MMOL/L (ref 136–145)

## 2018-08-18 PROCEDURE — 74011000250 HC RX REV CODE- 250: Performed by: HOSPITALIST

## 2018-08-18 PROCEDURE — 80053 COMPREHEN METABOLIC PANEL: CPT | Performed by: HOSPITALIST

## 2018-08-18 PROCEDURE — 84100 ASSAY OF PHOSPHORUS: CPT | Performed by: HOSPITALIST

## 2018-08-18 PROCEDURE — 83690 ASSAY OF LIPASE: CPT | Performed by: HOSPITALIST

## 2018-08-18 PROCEDURE — 74011250636 HC RX REV CODE- 250/636: Performed by: HOSPITALIST

## 2018-08-18 PROCEDURE — 83735 ASSAY OF MAGNESIUM: CPT | Performed by: HOSPITALIST

## 2018-08-18 PROCEDURE — 74011636637 HC RX REV CODE- 636/637: Performed by: HOSPITALIST

## 2018-08-18 PROCEDURE — 36415 COLL VENOUS BLD VENIPUNCTURE: CPT | Performed by: HOSPITALIST

## 2018-08-18 PROCEDURE — 74011250637 HC RX REV CODE- 250/637: Performed by: HOSPITALIST

## 2018-08-18 RX ADMIN — INSULIN LISPRO 2 UNITS: 100 INJECTION, SOLUTION INTRAVENOUS; SUBCUTANEOUS at 13:00

## 2018-08-18 RX ADMIN — Medication 10 ML: at 06:11

## 2018-08-18 RX ADMIN — FAMOTIDINE 20 MG: 10 INJECTION, SOLUTION INTRAVENOUS at 09:14

## 2018-08-18 RX ADMIN — GABAPENTIN 900 MG: 300 CAPSULE ORAL at 09:14

## 2018-08-18 RX ADMIN — SODIUM CHLORIDE, SODIUM LACTATE, POTASSIUM CHLORIDE, AND CALCIUM CHLORIDE 75 ML/HR: 600; 310; 30; 20 INJECTION, SOLUTION INTRAVENOUS at 04:10

## 2018-08-18 NOTE — DISCHARGE SUMMARY
Hospitalist Discharge Summary     Patient ID:  Wilmer Rivera  [id number]  28 y.o.  1955    PCP on record: Sherice Colón DO    Admit date: 8/16/2018  Discharge date and time: 8/18/2018      DISCHARGE DIAGNOSIS:    Acute pancreatitis, POA  Hx cholangiocarcinoma, POA dx 7/17, s/p Whipple surgery 10/17 by Dr. Siegel Case followed by chemotherapy  DM type 2    HTN  Hx intracerebral hemorrhage due to aneurysm and while on coumadin s/p ender holes 10 years ago  Hx afib s/p ablation 10 years ago.  Had postop p. afib 10/17 after Whipple, not on anticoagulation  Sjogren's disease  Code status: Full         CONSULTATIONS:  IP CONSULT TO GASTROENTEROLOGY    Excerpted HPI from H&P of Celi Frederick MD:    Wilmer Rivera is a 65 y.o.  male with PMH HTN, DM type 2 started on metformin 2 weeks ago, hx cerebral hemorrhage due to aneurysm while on coumadin, hx p. 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     Patient underwent Whipple surgery 10/2017 for cholangiocarcinoma. Finished chemotherapy 3 months ago. Since surgery, has frequent intermittent episodes nausea and vomiting whether it be once a week or up to 3-4x a week. Was started on metformin 2 weeks ago for diabetes and experience increased nausea and vomiting, along with intermittent abdominal pain. He stopped taking metformin on 8/13. No hx of pancreatitis. No fever, chills, diarrhea, constipation, CP, SOB.     We were asked to admit for work up and evaluation of the above problems. ______________________________________________________________________  DISCHARGE SUMMARY/HOSPITAL COURSE:  for full details see H&P, daily progress notes, labs, consult notes.      Acute pancreatitis, POA  Hx cholangiocarcinoma, POA dx 7/17, s/p Whipple surgery 10/17 by Dr. Siegel Case followed by chemotherapy, finished 3 months ago  --clinically: doing well, abd pain resolved  lipase is down to 171; TG 78  Leukocytosis is down, was stress related    --diet:tolerating low fat   --cannot have MRCP due to metal plate in his back   --GI help appreciated: if not improving or recur will need ERCP  --stop Metformin 9 recently started)   --CT with new inflammation in RUQ adjacent to duodenum and surgical site with worsened pancreatic duct dilatation. Lipase >3000.        DM type 2    --BS 100S  -DC Metformin   A1c 6.5 6/18 --> can be treated with diet alone ( d/w pt)   --covered with moderate SSI while here       HTN  --BP stable  -cont home Ramipril       Hx intracerebral hemorrhage due to aneurysm and while on coumadin s/p ender holes 10 years ago  Hx afib s/p ablation 10 years ago.  Had postop p. afib 10/17 after Whipple, not on anticoagulation  Sjogren's disease              Code status: Full   Surrogate decision maker:  Wife Roshan Bonilla (nurse), currently away.  Emergency contacts elsa Barriga and Beto  Prophylaxis: Lovenox  Recommended Disposition: Home w/Family        _______________________________________________________________________  Patient seen and examined by me on discharge day. PHYSICAL EXAM:  General:                    WD, WN. Alert, cooperative, no acute distress    EENT:                                  EOMI. Anicteric sclerae. MMM  Resp:                                   CTA bilaterally, no wheezing or rales. No accessory muscle use  CV:                                      Regular  rhythm,  No edema  GI:                                       Soft, Non distended, not tender.  +Bowel sounds  Neurologic:                Alert and oriented X 3, normal speech,   Psych:                       Good insight. Not anxious nor agitated  Skin:                                    No rashes.   No jaundice  _______________________________________________________________________  DISCHARGE MEDICATIONS:   Current Discharge Medication List      CONTINUE these medications which have NOT CHANGED    Details   gabapentin (NEURONTIN) 300 mg capsule Take 900 mg by mouth two (2) times a day. 3 x 300mg BID      metoclopramide HCl (REGLAN) 5 mg tablet Take 10 mg by mouth three (3) times daily as needed for Nausea. alpha-D-galactosidase (BEANO PO) Take 1 Tab by mouth two (2) times a day. calcium carbonate (TUMS) 200 mg calcium (500 mg) chew Take 2 Tabs by mouth three (3) times daily as needed (indigestion). pantoprazole (PROTONIX) 40 mg tablet TAKE 1 BY MOUTH EVERY MORNING FOR 30 DAYS  Refills: 5      cholecalciferol, vitamin D3, (VITAMIN D3 PO) Take 1 Tab by mouth daily. ondansetron (ZOFRAN ODT) 8 mg disintegrating tablet Take 8 mg by mouth every eight (8) hours as needed for Nausea. cyanocobalamin (VITAMIN B12) 1,000 mcg/mL injection 1,000 mcg by IntraMUSCular route Twice a month. On the 1st and 15th      ramipril (ALTACE) 10 mg capsule Take 1 Cap by mouth nightly. Qty: 90 Cap, Refills: 3      cetirizine (ZYRTEC) 10 mg tablet Take 10 mg by mouth nightly. STOP taking these medications       metFORMIN ER (GLUCOPHAGE XR) 500 mg tablet Comments:   Reason for Stopping:               My Recommended Diet, Activity, Wound Care, and follow-up labs are listed in the patient's Discharge Insturctions which I have personally completed and reviewed.     ______________________________________________________________________    Risk of deterioration: Low    Condition at Discharge:  Stable  ______________________________________________________________________    Disposition  Home with family, no needs  ______________________________________________________________________    Care Plan discussed with:   Patient, RN    ______________________________________________________________________    Code Status: Full Code  ______________________________________________________________________      Follow up with:   PCP : Tegan Jenkins III, DO  Follow-up Information Follow up With Details Comments [address] III, DO In 1 week  [address] [facility]  [address] [facility]      Roly Pearson MD In 1 month  [address]  [phone]                Total time in minutes spent coordinating this discharge (includes going over instructions, follow-up, prescriptions, and preparing report for sign off to her PCP) :  > 30  minutes    Signed:  Royal Dalton MD

## 2018-08-18 NOTE — PROGRESS NOTES
I have reviewed discharge instructions with the patient. The patient verbalized understanding. Discharge medications reviewed with patient and appropriate educational materials and side effects teaching were provided. Follow-up appointments reviewed with patient. Opportunity for questions or concerns given. Venous access removed without difficulty, pt tolerated well. Patients belongings gathered and sent with patient. Patient is ready for discharge home with family via wheelchair.

## 2018-08-18 NOTE — PROGRESS NOTES
Reason for Admission:   Acute Pancreatitis                   RRAT Score:  16                Do you (patient/family) have any concerns for transition/discharge? Plan for utilizing home health:         Likelihood of readmission?                Transition of Care Plan:

## 2018-08-18 NOTE — PROGRESS NOTES
Oncology Nursing Communication Tool  6:33 AM  8/18/2018     Bedside shift change report given to Sandrine Limon RN (incoming nurse) by Jaye Woodard (outgoing nurse[facility]. Report included the following information SBAR, Kardex, Intake/Output, MAR and Recent Results. Shift Summary: slept most of the shift. Up ad lola to RR. No pain overnight, did not need insulin coverage. Ready for d/c      Issues for physician to address: d/c         Oncology Shift Note   Admission Date 8/16/2018   Admission Diagnosis Acute pancreatitis  Leukocytosis   Code Status Full Code   Consults IP CONSULT TO GASTROENTEROLOGY      Cardiac Monitoring [] Yes [x] No      Purposeful Hourly Rounding [x] Yes    Rosalia Score Total Score: 1   Rosalia score 3 or > [] Bed Alarm [] Avasys [] 1:1 sitter [] Patient refused (Place signed refusal form in chart)      Pain Managed [] Yes [] No    Key Pain Meds     The patient is on no pain meds. Influenza Vaccine Received Flu Vaccine for Current Season (usually Sept-March): Not Flu Season           Oxygen needs? [x] Room air Oxygen @  []1L    []2L    []3L   []4L    []5L   []6L     Use home O2? [] Yes [] No  Perform O2 challenge test using  smartphrase (.oxygenchallenge)      Last bowel movement Last Bowel Movement Date: 08/17/18  bowel movement      Urinary Catheter             LDAs             Venous Access Device Power [facility] Implantable Port 11/07/17 Upper chest (subclavicular area, right (Active)      Peripheral IV 08/16/18 Right Antecubital (Active)   Site Assessment Clean, dry, & intact 8/18/2018  2:18 AM   Phlebitis Assessment 0 8/18/2018  2:18 AM   Infiltration Assessment 0 8/18/2018  2:18 AM   Dressing Status Clean, dry, & intact 8/18/2018  2:18 AM   Dressing Type Tape;Transparent 8/18/2018  2:18 AM   Hub Color/Line Status Pink; Infusing 8/18/2018  2:18 AM   Action Taken Open ports on tubing capped 8/17/2018  3:02 PM   Alcohol Cap Used Yes 8/17/2018  3:02 PM Readmission Risk Assessment Tool Score Medium Risk            16       Total Score        3 Has Seen PCP in Last 6 Months (Yes=3, No=0)    4 IP Visits Last 12 Months (1-3=4, 4=9, >4=11)    9 Charlson Comorbidity Score (Age + Comorbid Conditions)        Criteria that do not apply:    . Living with Significant Other. Assisted Living. LTAC. SNF. or   Rehab    Patient Length of Stay (>5 days = 3)    Pt.  Coverage (Medicare=5 , Medicaid, or Self-Pay=4)       Expected Length of Stay 3d 7h   Actual Length of Stay 2          Ranjana Cody

## 2018-08-18 NOTE — PROGRESS NOTES
Hospitalist Progress Note    NAME: Refugio Guillermo   :  1955   MRN:  [id number]       Assessment / Plan:  Acute pancreatitis, POA  Hx cholangiocarcinoma, POA dx , s/p Whipple surgery 10/17 by Dr. Marilee Yost followed by chemotherapy, finished 3 months ago  --clinically: doing well, abd pain resolved  lipase is down to 171; TG 78  Leukocytosis is down, was stress related    --diet:advancing to low fat   --IVF  - stop   --cannot have MRCP due to metal plate in his back   --GI help appreciated: if not improving or recur will need ERCP  --stop Metformin 9 recently started)   --CT with new inflammation in RUQ adjacent to duodenum and surgical site with worsened pancreatic duct dilatation. Lipase >3000.       DM type 2    --BS 100S  -DC Metformin   A1c 6.5  --> can be treated with diet alone ( d/w pt)   --covered with moderate SSI while here      HTN  --BP stable  -cont home Ramipril      Hx intracerebral hemorrhage due to aneurysm and while on coumadin s/p ender holes 10 years ago  Hx afib s/p ablation 10 years ago. Had postop p. afib 10/17 after Whipple, not on anticoagulation  Sjogren's disease          Code status: Full   Surrogate decision maker:  Wife Obinna Palacios (nurse), currently away. Emergency contacts sons Jay Houston and Benjy Santos  Prophylaxis: Lovenox  Recommended Disposition: Home w/Family later on today if tolerating diet      Subjective:     Chief Complaint / Reason for Physician Visit: following pancreatitis   Doing great  No abd pain     Discussed with RN events overnight. Review of Systems:  Symptom Y/N Comments  Symptom Y/N Comments   Fever/Chills    Chest Pain     Poor Appetite    Edema     Cough    Abdominal Pain n    Sputum    Joint Pain     SOB/VIVEROS    Pruritis/Rash     Nausea/vomit n   Tolerating PT/OT     Diarrhea    Tolerating Diet     Constipation    Other       Could NOT obtain due to:      Objective:     VITALS:   Last 24hrs VS reviewed since prior progress note.  Most recent are:  Patient Vitals for the past 24 hrs:   Temp Pulse Resp BP SpO2   08/17/18 2342 97.6 °F (36.4 °C) 73 16 116/69 95 %   08/17/18 2008 98 °F (36.7 °C) 66 20 116/70 94 %   08/17/18 1533 98 °F (36.7 °C) 67 18 118/72 96 %   08/17/18 0755 98.3 °F (36.8 °C) 66 18 118/66 96 %       Intake/Output Summary (Last 24 hours) at 08/18/18 0736  Last data filed at 08/17/18 1502   Gross per 24 hour   Intake          1368.75 ml   Output                0 ml   Net          1368.75 ml        PHYSICAL EXAM:  General: WD, WN. Alert, cooperative, no acute distress    EENT:  EOMI. Anicteric sclerae. MMM  Resp:  CTA bilaterally, no wheezing or rales. No accessory muscle use  CV:  Regular  rhythm,  No edema  GI:  Soft, Non distended, not tender.  +Bowel sounds  Neurologic:  Alert and oriented X 3, normal speech,   Psych:   Good insight. Not anxious nor agitated  Skin:  No rashes. No jaundice    Reviewed most current lab test results and cultures  YES  Reviewed most current radiology test results   YES  Review and summation of old records today    NO  Reviewed patient's current orders and MAR    YES  PMH/ reviewed - no change compared to H&P  ________________________________________________________________________  Care Plan discussed with:    Comments   Patient y    Family      RN y    Care Manager     Consultant                        Multidiciplinary team rounds were held today with , nursing, pharmacist and clinical coordinator. Patient's plan of care was discussed; medications were reviewed and discharge planning was addressed.      ________________________________________________________________________  Total NON critical care TIME:  35  Minutes    Total CRITICAL CARE TIME Spent:   Minutes non procedure based      Comments   >50% of visit spent in counseling and coordination of care y Dc coordination    ________________________________________________________________________  Julian Peterson MD     Procedures: see electronic medical records for all procedures/Xrays and details which were not copied into this note but were reviewed prior to creation of Plan. LABS:  I reviewed today's most current labs and imaging studies.   Pertinent labs include:  Recent Labs      08/17/18   0422 08/16/18   0856   WBC  6.5  16.7*   HGB  12.5  16.1   HCT  37.4  47.5   PLT  125*  160     Recent Labs      08/18/18   0420  08/17/18 0422 08/16/18   0856   NA  139  139  136   K  3.7  3.7  4.0   CL  105  106  100   CO2  28  26  30   GLU  118*  113*  221*   BUN  10  10  18   CREA  0.77  0.74  0.97   CA  8.7  8.3*  8.9   MG  2.3  2.0   --    PHOS  4.0  3.5   --    ALB  3.1*  2.9*  4.1   TBILI  0.9  1.1*  1.1*   SGOT  10*  10*  23   ALT  34  36  57       Signed: Lily Conner MD

## 2018-08-18 NOTE — DISCHARGE INSTRUCTIONS
Patient Discharge Instructions    Mal Numbers / [id number] : 1955    Admitted 2018 Discharged: 2018         DISCHARGE DIAGNOSIS:       Acute pancreatitis     Take Home Medications         General drug facts     If you have a very bad allergy, wear an allergy ID at all times. It is important that you take the medication exactly as they are prescribed. Keep your medication in the bottles provided by the pharmacist.  Keep a list of all your drugs (prescription, natural products, vitamins, OTC) with you. Give this list to your doctor. Do not take other medications without consulting your doctor. Do not share your drugs with others and do not take anyone else's drugs. Keep all drugs out of the reach of children and pets. Most drugs may be thrown away in household trash after mixing with coffee grounds or greer litter and sealing in a plastic bag. Keep a list Call your doctor for help with any side effects. If in the [place] you may also call the FDA at 5-352-TMB-2110    Talk with the doctor before starting any new drug, including OTC, natural products, or vitamins. What to do at Home    1. Recommended diet:low fat diet     2. Recommended activity: Activity as tolerated    3. If you experience any of the following symptoms then please call your primary care physician or return to the emergency room if you cannot get hold of your doctor: fever/chills/worsening abdominal pain     4. Bring these papers with you to your follow up appointments.  The papers will help your doctors be sure to continue the care plan from the hospital.      Follow-up with:   PCP: Elvin New III, DO  Follow-up Information     Follow up With Details Comments [address], III, DO In 1 week  [address] [facility]  [address] [facility]      Michela Butler MD In 1 month  [address] [facility]. [phone] Please call for your own appointment        Information obtained by :  I understand that if any problems occur once I am at home I am to contact my physician. I understand and acknowledge receipt of the instructions indicated above.                                                                                                                                            Physician's or R.N.'s Signature                                                                  Date/Time                                                                                                                                              Patient or Representative Signature                                                          Date/Time

## 2018-08-19 LAB — IGG4 SER-MCNC: 3 MG/DL (ref 2–96)

## 2018-08-20 ENCOUNTER — PATIENT OUTREACH (OUTPATIENT)
Dept: OTHER | Age: 63
End: 2018-08-20

## 2018-08-20 NOTE — PROGRESS NOTES
[facility] Call:      Patient on report as with discharge from hospital 8/18 for acute pancreititis. RRAT score for inpatient stay:  16    Initial attempt to contact patient for transitions of care. Left discreet message on voicemail with this CM contact information. Will attempt to contact again. Chart Review:      DISCHARGE MEDICATIONS:         Current Discharge Medication List             CONTINUE these medications which have NOT CHANGED     Details   gabapentin (NEURONTIN) 300 mg capsule Take 900 mg by mouth two (2) times a day. 3 x 300mg BID       metoclopramide HCl (REGLAN) 5 mg tablet Take 10 mg by mouth three (3) times daily as needed for Nausea.       alpha-D-galactosidase (BEANO PO) Take 1 Tab by mouth two (2) times a day.       calcium carbonate (TUMS) 200 mg calcium (500 mg) chew Take 2 Tabs by mouth three (3) times daily as needed (indigestion).       pantoprazole (PROTONIX) 40 mg tablet TAKE 1 BY MOUTH EVERY MORNING FOR 30 DAYS  Refills: 5       cholecalciferol, vitamin D3, (VITAMIN D3 PO) Take 1 Tab by mouth daily.       ondansetron (ZOFRAN ODT) 8 mg disintegrating tablet Take 8 mg by mouth every eight (8) hours as needed for Nausea.       cyanocobalamin (VITAMIN B12) 1,000 mcg/mL injection 1,000 mcg by IntraMUSCular route Twice a month. On the 1st and 15th       ramipril (ALTACE) 10 mg capsule Take 1 Cap by mouth nightly.   Qty: 90 Cap, Refills: 3       cetirizine (ZYRTEC) 10 mg tablet Take 10 mg by mouth nightly.                STOP taking these medications         metFORMIN ER (GLUCOPHAGE XR) 500 mg tablet Comments:   Reason for Stopping:

## 2018-08-21 ENCOUNTER — PATIENT OUTREACH (OUTPATIENT)
Dept: OTHER | Age: 63
End: 2018-08-21

## 2018-08-21 NOTE — Clinical Note
Mr. Tomy Miles was discharged from inpatient and thinks he has a FU apt. He has stopped Metformin as ordered by inpatient stay. Thought you would want to FU with him.

## 2018-08-21 NOTE — PROGRESS NOTES
[facility] NOTE:   For Active CM patient. Mr. Daria Abad  has been contacted regarding recent inpatient stay for Pancreatitis [place] . RRAT score for inpatient stay:  16    Verified  and address for HIPAA security. Explained role and verified PCP. Discharge medications were reviewed with the patient by telephone. * Mr. Daria Abad shares that he hid his pain from his wife to allow her to go to a work meeting away from home. - After she left, he had his son drive him to the hospital.     - He lied to her while on her work conference so she would not worry or come home. - She is home now and has forgiven his behavior. * He believes that his symptoms started due to dehydration.     - Stopped taking Metformin as ordered (he reports not taking for the week prior to his symptoms.)    * He is relieved that the cancer is not a causative factor.     - Discussion about inflammation and causes of pancreatic flares. * Reviewed diet. He is being careful with lower carbs/ no added sugars;  Using salt substitutes and little fats. He misses the fats the most.     * Inquired about his activity level/  Encouraged rest.     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 But he promises that his sons will do the majority of work. - He states he will provide support and order but will focus on hydration and staying out of the heat. * I asked about his blood sugar readings. He states that his fasting remains \"Low 100's\"    * Elimination patterns are normal.      - Reviewed color of urine and frequency amounts should be closely monitored. - No diarrhea even while taking Metformin. Date of  Discharge:       Facility patient visited: [facility]   Reason for Visit:   Acute Pancreatitis    Reviewed scripts given at [place]? Yes      Able to obtain prescribed medication? Yes -  Not taking Metformin. How is the patient feeling? Pt stated he is much better.   Thinks he is 98% back to normal. Does the patient have any questions or problems? Not at this time   Any barriers with transportation? no   Follow-up Appointment date: Yes - This week for GI;   Thinks PCP apt for next week. Shared that I did not see this on the books. Reviewed Discharge instructions: & Red Flags:      Advanced Directive: Not addressed on this call. Offered to assist patient in ensuring that he has a follow up with PCP and the patient declined at this time. Patient states he will call the office today.     Patient has my name and contact information and instructed patient if there are any question to call. Advised that other people are on our team and follow up calls may be with myself or other staff. No further needs at this time. Next call planned for after Labor Day.   9/6

## 2018-08-24 ENCOUNTER — OFFICE VISIT (OUTPATIENT)
Dept: ONCOLOGY | Age: 63
End: 2018-08-24

## 2018-08-24 VITALS
OXYGEN SATURATION: 95 % | HEART RATE: 86 BPM | RESPIRATION RATE: 18 BRPM | TEMPERATURE: 97.9 F | HEIGHT: 68 IN | WEIGHT: 181.2 LBS | BODY MASS INDEX: 27.46 KG/M2 | SYSTOLIC BLOOD PRESSURE: 103 MMHG | DIASTOLIC BLOOD PRESSURE: 68 MMHG

## 2018-08-24 DIAGNOSIS — C22.1 CHOLANGIOCARCINOMA OF BILIARY TRACT (HCC): Primary | ICD-10-CM

## 2018-08-24 NOTE — PROGRESS NOTES
Identified pt with two pt identifiers(name and ). Reviewed record in preparation for visit and have obtained necessary documentation. Chief Complaint   Patient presents with    Cancer     biliary duct        Health Maintenance Due   Topic    FOOT EXAM Q1     MICROALBUMIN Q1     EYE EXAM RETINAL OR DILATED Q1     Influenza Age 5 to Adult     LIPID PANEL Q1      Visit Vitals    /68    Pulse 86    Temp 97.9 °F (36.6 °C) (Oral)    Resp 18    Ht 5' 8\" (1.727 m)    Wt 181 lb 3.2 oz (82.2 kg)    SpO2 95%    BMI 27.55 kg/m2         Coordination of Care Questionnaire:  :   1) Have you been to an emergency room, urgent care clinic since your last visit? no   Hospitalized since your last visit? no             2. Have seen or consulted any other health care provider since your last visit? NO  If yes, where when, and reason for visit? 3) Do you have an Advanced Directive/ Living Will in place? NO  If yes, do we have a copy on file NO  If no, would you like information NO    Patient is accompanied by self I have received verbal consent from Clarke Latham to discuss any/all medical information while they are present in the room.     June Marrufo LPN

## 2018-08-24 NOTE — PROGRESS NOTES
Follow-up Note      Patient: Talya Mcdaniel MRN: [id number]  SSN: [id number]    YOB: 1955  Age: 65 y.o. Sex: male      Diagnosis:     1. 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 S/P Pancreatoduodenectomy on  10/06/2017  2. Adjuvant monotherapy with Capecitabine - s/p 6 cycles   (12/4/2017 - 05/2018)    Subjective:      Talya Mcdaniel is a 65 y.o. male with a diagnosis of extrahepatic cholangiocarcinoma. He presented to the ED in August 2017 with obstructive jaundice. He was found to have an obstructive lesion in the dital bile duct. The CT showed marked intrahepatic biliary dilation and common bile duct dilation to the level of the mid common bile duct, which ws markedly narrowed. He underwent a stenting procedure followed by spyglass cholangiogram. The brushings revealed a diagnosis of cholangiocarcinoma. He underwent a whipple's procedure on 10/06/2017. He completed 6 cycles of adjuvant Xeloda. He was recently hospitalized for acute pancreatitis. During his admission, CT scans show no recurrent or metastatic disease. He feels well today with no complaints. Review of Systems:    Constitutional: negative  Eyes: negative  Ears, Nose, Mouth, Throat, and Face: negative  Respiratory: negative  Cardiovascular: negative  Gastrointestinal: negative  Genitourinary:negative  Integument/Breast: negative  Hematologic/Lymphatic: negative  Musculoskeletal:negative  Neurological: negative        Past Medical History:   Diagnosis Date    Arrhythmia     Previous a.fib, ablation, NSR now; NO LONGER FOLLOWED BY CARDIOLOGIST.     Autoimmune disease ([address]     SJOGREN'S    Cancer ([address]     COMMON BILE DUCT, ADENOCARCINOMA    Hypertension     Ill-defined condition     pre-diabetic - diet controlled    Sepsis ([address] 2017    STENTING TO BILE DUCT    Status post chemotherapy     Stroke [facility]) 2008    brain aneurysm - no deficits Past Surgical History:   Procedure Laterality Date    ABDOMEN SURGERY PROC UNLISTED  10/2017    Whippolvin     HX GI      COLONOSCOPY, POLYPS (BENIGN)    HX GI  10/06/2017    [facility]     [address]  2005    Ablation     HX ORTHOPAEDIC  2000    Spinal fusion W/ HARDWARE    HX OTHER SURGICAL  11/07/2017    [place] Cath, Dr. Wiley Granda  2017    PORTACATH - then removed    NEUROLOGICAL PROCEDURE UNLISTED  2005    [place] hole washout from cerebral hemorrhage      Family History   Problem Relation Age of Onset    Cancer Father      Breast and Colon    Cancer Mother      LEUKEMIA    No Known Problems Sister     No Known Problems Brother     No Known Problems Sister     Anesth Problems Neg Hx      Social History   Substance Use Topics    Smoking status: Never Smoker    Smokeless tobacco: Never Used    Alcohol use Yes      Comment: very rare      Prior to Admission medications    Medication Sig Start Date End Date Taking? Authorizing Provider   gabapentin (NEURONTIN) 300 mg capsule Take 900 mg by mouth two (2) times a day. 3 x 300mg BID   Yes Historical Provider   metoclopramide HCl (REGLAN) 5 mg tablet Take 10 mg by mouth three (3) times daily as needed for Nausea. Yes Flynn Mohan MD   alpha-D-galactosidase (BEANO PO) Take 1 Tab by mouth two (2) times a day. Yes Flynn Mohan MD   calcium carbonate (TUMS) 200 mg calcium (500 mg) chew Take 2 Tabs by mouth three (3) times daily as needed (indigestion). Yes Flynn Mohan MD   pantoprazole (PROTONIX) 40 mg tablet TAKE 1 BY MOUTH EVERY MORNING FOR 30 DAYS 7/22/18  Yes Historical Provider   cholecalciferol, vitamin D3, (VITAMIN D3 PO) Take 1 Tab by mouth daily. Yes Historical Provider   ondansetron (ZOFRAN ODT) 8 mg disintegrating tablet Take 8 mg by mouth every eight (8) hours as needed for Nausea.    Yes Historical Provider   cyanocobalamin (VITAMIN B12) 1,000 mcg/mL injection 1,000 mcg by IntraMUSCular route Twice a month. On the 1st and 15th   Yes Historical Provider   ramipril (ALTACE) 10 mg capsule Take 1 Cap by mouth nightly. 10/26/17  Yes Silas Raya III, DO   cetirizine (ZYRTEC) 10 mg tablet Take 10 mg by mouth nightly. Yes Historical Provider          Allergies   Allergen Reactions    Shellfish Derived Anaphylaxis     Soft shell crabs    Pcn [Penicillins] Other (comments)     Pt stated \"always been told PCN\"           Objective:     Vitals:    08/24/18 1152   BP: 103/68   Pulse: 86   Resp: 18   Temp: 97.9 °F (36.6 °C)   TempSrc: Oral   SpO2: 95%   Weight: 181 lb 3.2 oz (82.2 kg)   Height: 5' 8\" (1.727 m)            Physical Exam:    GENERAL: alert, cooperative, no distress, appears stated age  EYE: negative  LYMPHATIC: Cervical, supraclavicular, and axillary nodes normal.   THROAT & NECK: normal and no erythema or exudates noted. LUNG: clear to auscultation bilaterally  HEART: regular rate and rhythm  ABDOMEN: soft, non-tender  EXTREMITIES:  no edema  SKIN: Normal.  NEUROLOGIC: negative        CT Results (most recent):    Results from Hospital Encounter encounter on 08/16/18   CT ABD PELV W CONT   Narrative EXAM:  CT ABD PELV W CONT    INDICATION: acute abdominal pain; h/o obstruction, cancer  pain and vomiting,  pain for 10 hours, history of CBD cancer    COMPARISON: May 16    CONTRAST:  100 mL of Isovue-370. TECHNIQUE:   Following the uneventful intravenous administration of contrast, thin axial  images were obtained through the abdomen and pelvis. Coronal and sagittal  reconstructions were generated. Oral contrast was not administered. CT dose  reduction was achieved through use of a standardized protocol tailored for this  examination and automatic exposure control for dose modulation. FINDINGS:   LUNG BASES: Clear. INCIDENTALLY IMAGED HEART AND MEDIASTINUM: Unremarkable. LIVER: Pneumobilia. GALLBLADDER: Surgically removed. SPLEEN: No mass. PANCREAS: Status post Whipple.  Interval development of worsening pancreatic duct  dilatation. ADRENALS: Unremarkable. KIDNEYS: Left nephrolithiasis. STOMACH: Unremarkable. SMALL BOWEL: No dilatation or wall thickening. COLON: No dilatation or wall thickening. APPENDIX: Unremarkable. PERITONEUM: New inflammation in the right upper quadrant adjacent to the  duodenum and surgical site, extending to the portal confluence. The main  peritoneal lymph nodes are not significantly changed. RETROPERITONEUM: No lymphadenopathy or aortic aneurysm. REPRODUCTIVE ORGANS: Prostatic calcifications. URINARY BLADDER: No mass or calculus. BONES: No destructive bone lesion. ADDITIONAL COMMENTS: N/A         Impression IMPRESSION:  Acute pancreatitis. No definite of evidence of recurrence or metastatic disease. Assessment:     1. Extrahepatic cholangiocarcinoma:    T3 N1 (1 of 12 LN +ve)  Invasion into pancreas  R0 resection    > S/P Pancreatoduodenectomy on  10/06/2017    Completed adjuvant treatment with single agent Capecitabine - s/p 6 cycles (12/4/2017 - 05/2018). CT Abd Pelv (8/16/2018): no evidence of recurrent or metastatic disease  Recent episode of pancreatitis. Has recovered fully. Asymptomatic. In remission  Continue surveillance        Plan:       > CT Abd Pelv in 6 months  > Follow-up in 6 months      Signed by: April Fernandez MD                     August 24, 2018        CC. King Avvia MD  CC. Miesha Cook MD  CC. Guero Gibbs MD  CC.  Giovani Santos MD

## 2018-08-27 ENCOUNTER — PATIENT OUTREACH (OUTPATIENT)
Dept: INTERNAL MEDICINE CLINIC | Age: 63
End: 2018-08-27

## 2018-08-27 NOTE — PROGRESS NOTES
Was asked by BARON WOMACK to consider seeing pt regarding diabetes f/u. Spoke to pt. His blood sugars are between 160-200 fasting. He was taken off of metformin in the hospital and is currently taking no antidiabetic medication. Took insulin in the hospital and fasting blood sugar was 100-110. Pt was scheduled for [facility] appt with Dr. Vianey Jaffe on 8/31/18 and for diabetes f/u on 9/11/18 with this diabetes educator. Pt stated he is feeling much better. Message  Received: 6 days ago       Kathleen Santa, ESVIN Cruz, ESVIN                     Mr. Cristina was discharged from inpatient and thinks he has a FU apt. [facility] has stopped Metformin as ordered by inpatient stay.   Thought you would want to FU with him.

## 2018-08-31 ENCOUNTER — OFFICE VISIT (OUTPATIENT)
Dept: INTERNAL MEDICINE CLINIC | Age: 63
End: 2018-08-31

## 2018-08-31 VITALS
WEIGHT: 181.2 LBS | OXYGEN SATURATION: 99 % | RESPIRATION RATE: 16 BRPM | DIASTOLIC BLOOD PRESSURE: 74 MMHG | SYSTOLIC BLOOD PRESSURE: 117 MMHG | BODY MASS INDEX: 27.46 KG/M2 | HEART RATE: 80 BPM | TEMPERATURE: 98.1 F | HEIGHT: 68 IN

## 2018-08-31 DIAGNOSIS — E11.9 CONTROLLED TYPE 2 DIABETES MELLITUS WITHOUT COMPLICATION, WITHOUT LONG-TERM CURRENT USE OF INSULIN (HCC): Primary | Chronic | ICD-10-CM

## 2018-08-31 DIAGNOSIS — C22.1 CHOLANGIOCARCINOMA OF BILIARY TRACT (HCC): ICD-10-CM

## 2018-08-31 DIAGNOSIS — I10 ESSENTIAL HYPERTENSION: Chronic | ICD-10-CM

## 2018-08-31 DIAGNOSIS — K85.90 ACUTE PANCREATITIS WITHOUT INFECTION OR NECROSIS, UNSPECIFIED PANCREATITIS TYPE: ICD-10-CM

## 2018-08-31 LAB — HBA1C MFR BLD HPLC: 7.6 %

## 2018-08-31 RX ORDER — GLIPIZIDE 5 MG/1
5 TABLET ORAL DAILY
Qty: 90 TAB | Refills: 3 | Status: SHIPPED | OUTPATIENT
Start: 2018-08-31 | End: 2019-01-14

## 2018-08-31 NOTE — PROGRESS NOTES
Talya Mcdaniel is a 65 y.o. male who presents for evaluation of transition of care. Was inpt [facility] aug 16-18 with acute pancreatitis, thought perhaps due to glucophage, which he has since stopped. Doing well, though his am sugars now range 160-220. ROS: 
Constitutional: negative for fevers, chills, anorexia and weight loss Eyes:   negative for visual disturbance and irritation ENT:   negative for tinnitus,sore throat,nasal congestion,ear pain,hoarseness Respiratory:  negative for cough, hemoptysis, dyspnea,wheezing CV:   negative for chest pain, palpitations, lower extremity edema GI:   negative for nausea, vomiting, diarrhea, abdominal pain,melena Genitourinary: negative for frequency, dysuria and hematuria Musculoskel: negative for myalgias, arthralgias, back pain, muscle weakness, joint pain Neurological:  negative for headaches, dizziness, focal weakness, numbness Psychiatric:     Negative for depression or anxiety Past Medical History:  
Diagnosis Date  Arrhythmia Previous a.fib, ablation, NSR now; NO LONGER FOLLOWED BY CARDIOLOGIST.  Autoimmune disease ([facility] [address] SJOGREN'S  
 Cancer ([address] COMMON BILE DUCT, ADENOCARCINOMA  Hypertension  Ill-defined condition   
 pre-diabetic - diet controlled  Sepsis ([facility] [address] 2017 STENTING TO BILE DUCT  Status post chemotherapy  Stroke [facility]) 2008  
 brain aneurysm - no deficits Past Surgical History:  
Procedure Laterality Date  ABDOMEN SURGERY PROC UNLISTED  10/2017 Whippolvin  HX GI    
 COLONOSCOPY, POLYPS (BENIGN)  HX GI  10/06/2017 Viktor Thompson [address] [facility] [address] [facility] [facility]  [facility] [address]blation [address] Spinal fusion W/ HARDWARE  
 HX OTHER SURGICAL  [facility]  2017 PORTACATH - then removed  NEUROLOGICAL PROCEDURE UNLISTED  2005 [place] hole washout from cerebral hemorrhage Family History Problem Relation Age of Onset  Cancer Father Breast and Colon  Cancer Mother LEUKEMIA  
 No Known Problems Sister  No Known Problems Brother  No Known Problems Sister  Anesth Problems Neg Hx Social History Social History  Marital status:  Spouse name: N/A  
 Number of children: N/A  
 Years of education: N/A Occupational History  Not on file. Social History Main Topics  Smoking status: Never Smoker  Smokeless tobacco: Never Used  Alcohol use Yes Comment: very rare  Drug use: No  
 Sexual activity: Yes  
  Partners: Female Other Topics Concern  Not on file Social History Narrative Visit Vitals  /74 (BP 1 Location: Left arm, BP Patient Position: Sitting)  Pulse 80  Temp 98.1 °F (36.7 °C) (Oral)  Resp 16  
 Ht 5' 8\" (1.727 m)  Wt 181 lb 3.2 oz (82.2 kg)  SpO2 99%  BMI 27.55 kg/m2 Physical Examination:  
General - Well appearing male HEENT - PERRL, TM no erythema/opacification, normal nasal turbinates, no oropharyngeal erythema or exudate, MMM Neck - supple, no bruits, no thyroidomegaly, no lymphadenopathy Pulm - clear to auscultation bilaterally Cardio - RRR, normal S1 S2, no murmur Abd - soft, nontender, no masses, no HSM Extrem - no edema, +2 distal pulses Neuro-  No focal deficits, CN intact Assessment/Plan: 1. Acute pancreatitis--resolved, ? Due to glucophage, which has been stopped 2. Dm, type 2--start glipizide 5 mg, take only with breakfast, and only when am fasting sugar is above 150 3.  htn--controlled with altace 4. Hx cholangiocarcinoma--did well with whipples, ct scan with no signs of recurrence 5. Hx ICH due to aneurysm, while on coumadin 
 
rtc 3 months for regular visit.  
 
 
 
Matti Patterson III, DO

## 2018-08-31 NOTE — MR AVS SNAPSHOT
[address] [facility] 
[phone] Patient: Chandra Dwyer MRN: [id number] BDM:[id number] Visit Information Date & Time Provider Department Dept. Phone Encounter #  
 8/31/2018 11:45 AM Bruce Ricks, [facility] [phone] [id number] Follow-up Instructions Return in about 3 months (around 11/30/2018) for regular visit. Your Appointments 9/11/2018 10:30 AM  
ROUTINE CARE with NURSE NAVIGATOR3 [facility] Appt Note: [place] f/u for pancreatitis - taken off of antidiabetic meds [facility] [address] [facility]  
[phone]  
  
   
 [facility] [address]  [address] [address]  
  
    
 10/30/2018  8:45 AM  
PHYSICAL PRE OP with Batsheva Luna III, DO [facility] Appt Note: 3 month follow up; 3 month follow up  
 [facility] [address] [facility]  
[phone]  
  
   
 [facility] [address]  [address] [address]  
  
    
 2/25/2019 11:45 AM  
ESTABLISHED PATIENT with Zi Law MD  
[address] [facility] at [facility]) Appt Note: onc 6 mth f/u  
 [address] [address]  
[address] [place] [address] [address] [facility] [address] Jose Balderas Upcoming Health Maintenance Date Due  
 FOOT EXAM Q1 9/26/1966 MICROALBUMIN Q1 9/26/1966 EYE EXAM RETINAL OR DILATED Q1 9/26/1966 Influenza Age 5 to Adult 8/1/2018 LIPID PANEL Q1 8/25/2018 HEMOGLOBIN A1C Q6M 12/7/2018 Pneumococcal 19-64 Highest Risk (3 of 3 - PPSV23) 1/4/2022 DTaP/Tdap/Td series (2 - Td) 1/7/2023 COLONOSCOPY 6/7/2023 Allergies as of 8/31/2018  Review Complete On: 8/31/2018 By: Batsheva Luna III, DO  
  
 Severity Noted Reaction Type Reactions Shellfish Derived High 06/15/2018   Systemic Anaphylaxis Soft shell crabs Pcn [Penicillins]  07/23/2017    Other (comments) Pt stated \"always been told PCN\" Current Immunizations  Reviewed on 8/24/2018 Name Date Pneumococcal Conjugate (PCV-13) 10/26/2017 Pneumococcal Polysaccharide (PPSV-23) 1/4/2017 Not reviewed this visit You Were Diagnosed With   
  
 Codes Comments Controlled type 2 diabetes mellitus without complication, without long-term current use of insulin ([facility]    -  Primary ICD-10-CM: E11.9 ICD-9-CM: 250.00 Essential hypertension     ICD-10-CM: I10 
ICD-9-CM: 401.9 Acute pancreatitis without infection or necrosis, unspecified pancreatitis type     ICD-10-CM: K85.90 ICD-9-CM: 358.6 Cholangiocarcinoma of biliary tract ([facility]     ICD-10-CM: C24.9 ICD-9-CM: 156.9 Vitals BP Pulse Temp Resp Height(growth percentile) Weight(growth percentile) 117/74 (BP 1 Location: Left arm, BP Patient Position: Sitting) 80 98.1 °F (36.7 °C) (Oral) 16 5' 8\" (1.727 m) 181 lb 3.2 oz (82.2 kg) SpO2 BMI Smoking Status 99% 27.55 kg/m2 Never Smoker Vitals History BMI and BSA Data Body Mass Index Body Surface Area  
 27.55 kg/m 2 1.99 m 2 Preferred Pharmacy Pharmacy Name Phone [facility] [address] [place] [address] [phone] Your Updated Medication List  
  
   
This list is accurate as of 8/31/18 12:38 PM.  Always use your most recent med list.  
  
  
  
  
 Geoff Beau Take 1 Tab by mouth two (2) times a day. calcium carbonate 200 mg calcium (500 mg) Chew Commonly known as:  TUMS Take 2 Tabs by mouth three (3) times daily as needed (indigestion). cyanocobalamin 1,000 mcg/mL injection Commonly known as:  VITAMIN B12  
1,000 mcg by IntraMUSCular route Twice a month. On the 1st and 15th  
  
 gabapentin 300 mg capsule Commonly known as:  NEURONTIN Take 900 mg by mouth two (2) times a day. 3 x 300mg BID  
  
 glipiZIDE 5 mg tablet Commonly known as:  Olevia Handy Take 1 Tab by mouth daily. Take only when am sugar is greater than 150, and then make sure to eat. pantoprazole 40 mg tablet Commonly known as:  PROTONIX  
TAKE 1 BY MOUTH EVERY MORNING FOR 30 DAYS  
  
 ramipril 10 mg capsule Commonly known as:  ALTACE Take 1 Cap by mouth nightly. VITAMIN D3 PO Take 1 Tab by mouth daily. ZOFRAN ODT 8 mg disintegrating tablet Generic drug:  ondansetron Take 8 mg by mouth every eight (8) hours as needed for Nausea. ZyrTEC 10 mg tablet Generic drug:  cetirizine Take 10 mg by mouth nightly. Prescriptions Sent to Pharmacy Refills  
 glipiZIDE (GLUCOTROL) 5 mg tablet 3 Sig: Take 1 Tab by mouth daily. Take only when am sugar is greater than 150, and then make sure to eat. Class: Normal  
 Pharmacy: [address] [place] [address] #: [phone] Route: Oral  
  
We Performed the Following AMB POC HEMOGLOBIN A1C [73646 CPT(R)] Follow-up Instructions Return in about 3 months (around 11/30/2018) for regular visit. To-Do List   
 09/17/2018 1:30 PM  
  Appointment with CHAIR 2 [facility] ([phone]) 10/15/2018 1:30 PM  
  Appointment with [address] [place] [address] ([phone])  
  
 11/19/2018 1:30 PM  
  Appointment with [facility] ([phone]) Patient Instructions Nutrition Tips for Diabetes: After Your Visit Your Care Instructions A healthy diet is important to manage diabetes. It helps you lose weight (if you need to) and keep it off. It gives you the nutrition and energy your body needs and helps prevent heart disease. But a diet for diabetes does not mean that you have to eat special foods.  You can eat what your family eats, including occasional sweets and other favorites. But you do have to pay attention to how often you eat and how much you eat of certain foods. The right plan for you will give you meals that help you keep your blood sugar at healthy levels. Try to eat a variety of foods and to spread carbohydrate throughout the day. Carbohydrate raises blood sugar higher and more quickly than any other nutrient does. Carbohydrate is found in sugar, breads and cereals, fruit, starchy vegetables such as potatoes and corn, and milk and yogurt. You may want to work with a dietitian or diabetes educator to help you plan meals and snacks. A dietitian or diabetes educator also can help you lose weight if that is one of your goals. The following tips can help you enjoy your meals and stay healthy. Follow-up care is a key part of your treatment and safety. Be sure to make and go to all appointments, and call your doctor if you are having problems. Its also a good idea to know your test results and keep a list of the medicines you take. How can you care for yourself at home? · Learn which foods have carbohydrate and how much carbohydrate to eat. A dietitian or diabetes educator can help you learn to keep track of how much carbohydrate you eat. · Spread carbohydrate throughout the day. Eat some carbohydrate at all meals, but do not eat too much at any one time. · Plan meals to include food from all the food groups. These are the food groups and some example portion sizes: ¨ Grains: 1 slice of bread (1 ounce), ½ cup of cooked cereal, and 1/3 cup of cooked pasta or rice. These have about 15 grams of carbohydrate in a serving. Choose whole grains such as whole wheat bread or crackers, oatmeal, and brown rice more often than refined grains.  
¨ Fruit: 1 small fresh fruit, such as an apple or orange; ½ of a banana; ½ cup of chopped, cooked, or canned fruit; ½ cup of fruit juice; 1 cup of melon or raspberries; and 2 tablespoons of dried fruit. These have about 15 grams of carbohydrate in a serving. ¨ Dairy: 1 cup of nonfat or low-fat milk and 2/3 cup of plain yogurt. These have about 15 grams of carbohydrate in a serving. ¨ Protein foods: Beef, chicken, turkey, fish, eggs, tofu, cheese, cottage cheese, and peanut butter. A serving size of meat is 3 ounces, which is about the size of a deck of cards. Examples of meat substitute serving sizes (equal to 1 ounce of meat) are 1/4 cup of cottage cheese, 1 egg, 1 tablespoon of peanut butter, and ½ cup of tofu. These have very little or no carbohydrate per serving. ¨ Vegetables: Starchy vegetables such as ½ cup of cooked dried beans, peas, potatoes, or corn have about 15 grams of carbohydrate. Nonstarchy vegetables have very little carbohydrate, such as 1 cup of raw leafy vegetables (such as spinach), ½ cup of other vegetables (cooked or chopped), and 3/4 cup of vegetable juice. · Use the plate format to plan meals. It is a good, quick way to make sure that you have a balanced meal. It also helps you spread carbohydrate throughout the day. You divide your plate by types of foods. Put vegetables on half the plate, meat or meat substitutes on one-quarter of the plate, and a grain or starchy vegetable (such as brown rice or a potato) in the final quarter of the plate. To this you can add a small piece of fruit and 1 cup of milk or yogurt, depending on how much carbohydrate you are supposed to eat at a meal. 
· Talk to your dietitian or diabetes educator about ways to add limited amounts of sweets into your meal plan. You can eat these foods now and then, as long as you include the amount of carbohydrate they have in your daily carbohydrate allowance. · If you drink alcohol, limit it to no more than 1 drink a day for women and 2 drinks a day for men. If you are pregnant, no amount of alcohol is known to be safe. · Protein, fat, and fiber do not raise blood sugar as much as carbohydrate does. If you eat a lot of these nutrients in a meal, your blood sugar will rise more slowly than it would otherwise. · Limit saturated fats, such as those from meat and dairy products. Try to replace it with monounsaturated fat, such as olive oil. This is a healthier choice because people who have diabetes are at higher-than-average risk of heart disease. But use a modest amount of olive oil. A tablespoon of olive oil has 14 grams of fat and 120 calories. · Exercise lowers blood sugar. If you take insulin by shots or pump, you can use less than you would if you were not exercising. Keep in mind that timing matters. If you exercise within 1 hour after a meal, your body may need less insulin for that meal than it would if you exercised 3 hours after the meal. Test your blood sugar to find out how exercise affects your need for insulin. · Exercise on most days of the week. Aim for at least 30 minutes. Exercise helps you stay at a healthy weight and helps your body use insulin. Walking is an easy way to get exercise. Gradually increase the amount you walk every day. You also may want to swim, bike, or do other activities. When you eat out · Learn to estimate the serving sizes of foods that have carbohydrate. If you measure food at home, it will be easier to estimate the amount in a serving of restaurant food. · If the meal you order has too much carbohydrate (such as potatoes, corn, or baked beans), ask to have a low-carbohydrate food instead. Ask for a salad or green vegetables. · If you use insulin, check your blood sugar before and after eating out to help you plan how much to eat in the future. · If you eat more carbohydrate at a meal than you had planned, take a walk or do other exercise. This will help lower your blood sugar. Where can you learn more? Go to DealExplorer.be Enter E500 in the search box to learn more about \"Nutrition Tips for Diabetes: After Your Visit. \"  
© 2378-6078 Healthwise, Incorporated. Care instructions adapted under license by Micah Baugh (which disclaims liability or warranty for this information). This care instruction is for use with your licensed healthcare professional. If you have questions about a medical condition or this instruction, always ask your healthcare professional. [address] any warranty or liability for your use of this information. Content Version: 29.6.533940; Current as of: June 4, 2014 Remember, you will only take the glipizide in the morning when your am fasting sugar is above 150. Then you need to also make sure that you eat breakfast, as your sugar can drop quickly due to glipizide if you don't eat. Introducing [facility] SERVICES! Dear Padmaja Marrero: Thank you for requesting a [employer] account. Our records indicate that you already have an active [employer] account. You can access your account anytime at [email or web address] Did you know that you can access your hospital and ER discharge instructions at any time in [employer]? You can also review all of your test results from your hospital stay or ER visit. Additional Information If you have questions, please visit the Frequently Asked Questions section of the [employer] website at [email or web address] Remember, [employer] is NOT to be used for urgent needs. For medical emergencies, dial 911. Now available from your iPhone and Android! Please provide this summary of care documentation to your next provider. Your primary care clinician is listed as Isabel Dhillon If you have any questions after today's visit, please call [phone].

## 2018-08-31 NOTE — PROGRESS NOTES
Reviewed record in preparation for visit and have obtained necessary documentation. Identified pt with two pt identifiers(name and ). Chief Complaint Patient presents with  Transitions Of Care  
  acute pancreatitis  Jaw Pain  
  right side, x2 weeks  Diabetes  
  blood glucose is running high, not on lucila medication Vitals:  
 18 1140 BP: 117/74 Pulse: 80 Resp: 16 Temp: 98.1 °F (36.7 °C) TempSrc: Oral  
SpO2: 99% Weight: 181 lb 3.2 oz (82.2 kg) Height: 5' 8\" (1.727 m) PainSc:   0 - No pain Medication reconciliation was completed verbally with the patient, all medications, route, dose, were verified by the patient. Any changes are noted in the medication list.   
 
Coordination of Care Questionnaire: 
:  
 
1) Have you been to an emergency room, urgent care clinic since your last visit? yes ED [facility] Hospitalized since your last visit? yes    ED [facility] 2) Have you seen or consulted any other health care providers outside of [address] [place] [address] since your last visit? no  (Include any pap smears or colon screenings in this section.) Health Maintenance Due Topic Date Due  
 FOOT EXAM Q1  1966  MICROALBUMIN Q1  1966  
 EYE EXAM RETINAL OR DILATED Q1  1966  Influenza Age 5 to Adult  2018  LIPID PANEL Q1  2018 Eye exam-1 year ago Navneet Go RN

## 2018-08-31 NOTE — PATIENT INSTRUCTIONS
Nutrition Tips for Diabetes: After Your Visit Your Care Instructions A healthy diet is important to manage diabetes. It helps you lose weight (if you need to) and keep it off. It gives you the nutrition and energy your body needs and helps prevent heart disease. But a diet for diabetes does not mean that you have to eat special foods. You can eat what your family eats, including occasional sweets and other favorites. But you do have to pay attention to how often you eat and how much you eat of certain foods. The right plan for you will give you meals that help you keep your blood sugar at healthy levels. Try to eat a variety of foods and to spread carbohydrate throughout the day. Carbohydrate raises blood sugar higher and more quickly than any other nutrient does. Carbohydrate is found in sugar, breads and cereals, fruit, starchy vegetables such as potatoes and corn, and milk and yogurt. You may want to work with a dietitian or diabetes educator to help you plan meals and snacks. A dietitian or diabetes educator also can help you lose weight if that is one of your goals. The following tips can help you enjoy your meals and stay healthy. Follow-up care is a key part of your treatment and safety. Be sure to make and go to all appointments, and call your doctor if you are having problems. Its also a good idea to know your test results and keep a list of the medicines you take. How can you care for yourself at home? · Learn which foods have carbohydrate and how much carbohydrate to eat. A dietitian or diabetes educator can help you learn to keep track of how much carbohydrate you eat. · Spread carbohydrate throughout the day. Eat some carbohydrate at all meals, but do not eat too much at any one time. · Plan meals to include food from all the food groups. These are the food groups and some example portion sizes: ¨ Grains: 1 slice of bread (1 ounce), ½ cup of cooked cereal, and 1/3 cup of cooked pasta or rice. These have about 15 grams of carbohydrate in a serving. Choose whole grains such as whole wheat bread or crackers, oatmeal, and brown rice more often than refined grains. ¨ Fruit: 1 small fresh fruit, such as an apple or orange; ½ of a banana; ½ cup of chopped, cooked, or canned fruit; ½ cup of fruit juice; 1 cup of melon or raspberries; and 2 tablespoons of dried fruit. These have about 15 grams of carbohydrate in a serving. ¨ Dairy: 1 cup of nonfat or low-fat milk and 2/3 cup of plain yogurt. These have about 15 grams of carbohydrate in a serving. ¨ Protein foods: Beef, chicken, turkey, fish, eggs, tofu, cheese, cottage cheese, and peanut butter. A serving size of meat is 3 ounces, which is about the size of a deck of cards. Examples of meat substitute serving sizes (equal to 1 ounce of meat) are 1/4 cup of cottage cheese, 1 egg, 1 tablespoon of peanut butter, and ½ cup of tofu. These have very little or no carbohydrate per serving. ¨ Vegetables: Starchy vegetables such as ½ cup of cooked dried beans, peas, potatoes, or corn have about 15 grams of carbohydrate. Nonstarchy vegetables have very little carbohydrate, such as 1 cup of raw leafy vegetables (such as spinach), ½ cup of other vegetables (cooked or chopped), and 3/4 cup of vegetable juice. · Use the plate format to plan meals. It is a good, quick way to make sure that you have a balanced meal. It also helps you spread carbohydrate throughout the day. You divide your plate by types of foods. Put vegetables on half the plate, meat or meat substitutes on one-quarter of the plate, and a grain or starchy vegetable (such as brown rice or a potato) in the final quarter of the plate.  To this you can add a small piece of fruit and 1 cup of milk or yogurt, depending on how much carbohydrate you are supposed to eat at a meal. 
· Talk to your dietitian or diabetes educator about ways to add limited amounts of sweets into your meal plan. You can eat these foods now and then, as long as you include the amount of carbohydrate they have in your daily carbohydrate allowance. · If you drink alcohol, limit it to no more than 1 drink a day for women and 2 drinks a day for men. If you are pregnant, no amount of alcohol is known to be safe. · Protein, fat, and fiber do not raise blood sugar as much as carbohydrate does. If you eat a lot of these nutrients in a meal, your blood sugar will rise more slowly than it would otherwise. · Limit saturated fats, such as those from meat and dairy products. Try to replace it with monounsaturated fat, such as olive oil. This is a healthier choice because people who have diabetes are at higher-than-average risk of heart disease. But use a modest amount of olive oil. A tablespoon of olive oil has 14 grams of fat and 120 calories. · Exercise lowers blood sugar. If you take insulin by shots or pump, you can use less than you would if you were not exercising. Keep in mind that timing matters. If you exercise within 1 hour after a meal, your body may need less insulin for that meal than it would if you exercised 3 hours after the meal. Test your blood sugar to find out how exercise affects your need for insulin. · Exercise on most days of the week. Aim for at least 30 minutes. Exercise helps you stay at a healthy weight and helps your body use insulin. Walking is an easy way to get exercise. Gradually increase the amount you walk every day. You also may want to swim, bike, or do other activities. When you eat out · Learn to estimate the serving sizes of foods that have carbohydrate. If you measure food at home, it will be easier to estimate the amount in a serving of restaurant food. · If the meal you order has too much carbohydrate (such as potatoes, corn, or baked beans), ask to have a low-carbohydrate food instead. Ask for a salad or green vegetables. · If you use insulin, check your blood sugar before and after eating out to help you plan how much to eat in the future. · If you eat more carbohydrate at a meal than you had planned, take a walk or do other exercise. This will help lower your blood sugar. Where can you learn more? Go to Qardio.be Enter O662 in the search box to learn more about \"Nutrition Tips for Diabetes: After Your Visit. \"  
© 8434-3658 Healthwise, Incorporated. Care instructions adapted under license by Milton Weaver (which disclaims liability or warranty for this information). This care instruction is for use with your licensed healthcare professional. If you have questions about a medical condition or this instruction, always ask your healthcare professional. [address] any warranty or liability for your use of this information. Content Version: 86.6.274023; Current as of: June 4, 2014 Remember, you will only take the glipizide in the morning when your am fasting sugar is above 150. Then you need to also make sure that you eat breakfast, as your sugar can drop quickly due to glipizide if you don't eat.

## 2018-09-06 ENCOUNTER — PATIENT OUTREACH (OUTPATIENT)
Dept: OTHER | Age: 63
End: 2018-09-06

## 2018-09-06 NOTE — PROGRESS NOTES
[facility]/CCM Progress Note. Mr. Edwin Johnson  has been contacted regarding recent inpatient stay for Pancreatitis DC 8/18. RRAT score for inpatient stay:  16 Chart review:  PCP visit 8/31;  NN Diabetic Educator apt planned 9/11. Attempted to contact patient for transitions of care services. Left discreet message on voicemail with this CM contact information. * No s/sx of pancreatitis with PCP visit. * Med change to glipizide as needed with BS fasting above 150 noted. Will follow for [facility] services. Next attempted outreach if no return call - 9/20 Chart Review:   OV 8/31 Visit Vitals  /74 (BP 1 Location: Left arm, BP Patient Position: Sitting)  Pulse 80  Temp 98.1 °F (36.7 °C) (Oral)  Resp 16  
 Ht 5' 8\" (1.727 m)  Wt 181 lb 3.2 oz (82.2 kg)  SpO2 99%  BMI 27.55 kg/m2  
  
  
Assessment/Plan: 
  
1. Acute pancreatitis--resolved, ? Due to glucophage, which has been stopped 2. Dm, type 2--start glipizide 5 mg, take only with breakfast, and only when am fasting sugar is above 150 3.  htn--controlled with altace 4. Hx cholangiocarcinoma--did well with whipples, ct scan with no signs of recurrence 5.   Hx ICH due to aneurysm, while on coumadin 
  
rtc 3 months for regular visit.

## 2018-09-17 ENCOUNTER — APPOINTMENT (OUTPATIENT)
Dept: INFUSION THERAPY | Age: 63
End: 2018-09-17

## 2018-09-20 ENCOUNTER — PATIENT OUTREACH (OUTPATIENT)
Dept: OTHER | Age: 63
End: 2018-09-20

## 2018-09-20 NOTE — ACP (ADVANCE CARE PLANNING)
Advanced Directive:  Patient offered information on development of Advanced Care Planning. * Resources sent to patient:  5 steps to ACP; Choosing a Health Proxy; Starter Kit. * Lee specific [employer].
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
## 2018-10-15 ENCOUNTER — APPOINTMENT (OUTPATIENT)
Dept: INFUSION THERAPY | Age: 63
End: 2018-10-15

## 2018-10-23 ENCOUNTER — PATIENT OUTREACH (OUTPATIENT)
Dept: OTHER | Age: 63
End: 2018-10-23

## 2018-10-23 NOTE — ACP (ADVANCE CARE PLANNING)
09/20  Advanced Directive:  Patient offered information on development of Advanced Care Planning. * Resources sent to patient by mail :  5 steps to ACP; Choosing a Health Proxy; Starter Kit. * Jesus Brush. 10/23  Advanced Directive:  He received mailing from last intereaction - Redoing  His will and ACP will be part of that. Advised him to take a copy with him to the doctor's office to scan when completed.

## 2018-10-23 NOTE — PROGRESS NOTES
CCM progress note 8:30am Called Mr. Cristina for CCM update. He asks if he can call me back, he is busy at the moment. 3:35 pm  Incoming call from Mr. Cristina. Verified  and address for HIPAA security. Changes since last call include:   
 Med changes:  None Doctors appointments :  10/30 with PCP;   apt to install a stent with scar tissue in pancreas. * BS running 130-200 am.   
 - Some days he climes up to 250 and he does not know why.    
 - Discussed upping his protein to stabilize BS. And keeping food diary if more than one or 2 days of high BS/ and that it may also be a sign of infection. * Vomiting only once per week. - Much less/ but CM shares that gastric lining may be damaged with prolonged exposure to gastric acidity. 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 - Hopes to have all harvest completed by [employer]. * Wife is on her last year for PhD in nursing.    
 - She has been very supportive throughout his illness.   
 - CM shares that she believes that she is fortunate to have him as her  as well. * Reviewed Health Maintenance due: For review with PCP and Diabetic Educator. Health Maintenance Due Topic Date Due  
 Diabetic Foot Care  1966  Albumin Urine Test  1966 [facility] Eye Exam  1966  Shingles Vaccine (1 of 2) 2006  Flu Vaccine  2018  Cholesterol Test   2018 - Flu shot from pharmacy. - Knows he still needs eye exam and foot exam.   I advised him to take shoes and socks off at the doctor's office for him to look. Or to discuss going to a podiatrist.     
 - Expects lab testing to be ordered. * I discussed with patient that I will be out of the office next week to return . Offered coverage with another ECM nurse, but patient declines. He will expect a call from me in 3 weeks. Check in for the patients goals:   
1) Goal    :  Oncology Support/  PCP apts. A) Progress -  Improving health/  Lifestyle. B) Barriers addressed - still vomiting about once per week. C) Utilizing strategy - PCP apt for gaps in care; Follow for stent placement next month. D) Plan for next check in   -  Plan for 11/15 Advanced Directive:  He received mailing from last intereaction - Redoing  His will and ACP will be part of that. Advised him to take a copy with him to the doctor's office to scan when completed. Patient's current stage of activation: Oncology;  PCP relationship Action-  Ongoing support;  Reinforcement of change attempts; Expect set-backs;  Rolling with resistance; OARS Maintenance - Developing a plan for maintenance and relapse prevention. Patient verbalized understanding of all information discussed. Pt has my contact information for any further questions, concerns, or needs. Plan for next call: 11/15

## 2018-10-30 ENCOUNTER — OFFICE VISIT (OUTPATIENT)
Dept: INTERNAL MEDICINE CLINIC | Age: 63
End: 2018-10-30

## 2018-10-30 VITALS
BODY MASS INDEX: 27.89 KG/M2 | DIASTOLIC BLOOD PRESSURE: 75 MMHG | OXYGEN SATURATION: 96 % | SYSTOLIC BLOOD PRESSURE: 115 MMHG | RESPIRATION RATE: 16 BRPM | WEIGHT: 184 LBS | TEMPERATURE: 97.6 F | HEART RATE: 72 BPM | HEIGHT: 68 IN

## 2018-10-30 DIAGNOSIS — E11.9 CONTROLLED TYPE 2 DIABETES MELLITUS WITHOUT COMPLICATION, WITHOUT LONG-TERM CURRENT USE OF INSULIN (HCC): Primary | ICD-10-CM

## 2018-10-30 DIAGNOSIS — M35.00 SJOGREN'S SYNDROME, WITH UNSPECIFIED ORGAN INVOLVEMENT (HCC): ICD-10-CM

## 2018-10-30 DIAGNOSIS — C22.1 CHOLANGIOCARCINOMA OF BILIARY TRACT (HCC): ICD-10-CM

## 2018-10-30 DIAGNOSIS — I48.0 PAROXYSMAL ATRIAL FIBRILLATION (HCC): ICD-10-CM

## 2018-10-30 DIAGNOSIS — I10 ESSENTIAL HYPERTENSION: ICD-10-CM

## 2018-10-30 NOTE — PROGRESS NOTES
Reviewed record in preparation for visit and have obtained necessary documentation. Identified pt with two pt identifiers(name and ). Chief Complaint Patient presents with  Diabetes 3 month follow up Health Maintenance Due Topic Date Due  
 FOOT EXAM Q1  1966  MICROALBUMIN Q1  1966  
 EYE EXAM RETINAL OR DILATED Q1  1966  Shingrix Vaccine Age 50> (1 of 2) 2006  Influenza Age 5 to Adult  2018  LIPID PANEL Q1  2018  MEDICARE YEARLY EXAM  10/29/2018 Mr. Diana Adames has a reminder for a \"due or due soon\" health maintenance. I have asked that he discuss health maintenance topic(s) due with His  primary care provider. Coordination of Care Questionnaire: 
:  
 
1) Have you been to an emergency room, urgent care clinic since your last visit? no  
Hospitalized since your last visit? no          
 
2) Have you seen or consulted any other health care providers outside of [address] [place] [address] since your last visit? no  (Include any pap smears or colon screenings in this section.) 3) Do you have an Advance Directive on file? no 
 
4) Are you interested in receiving information on Advance Directives? yes Patient is accompanied by self I have received verbal consent from Brady Holcomb to discuss any/all medical information while they are present in the room.
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
## 2018-10-30 NOTE — PATIENT INSTRUCTIONS
Diabetes Foot Health: Care Instructions Your Care Instructions When you have diabetes, your feet need extra care and attention. Diabetes can damage the nerve endings and blood vessels in your feet, making you less likely to notice when your feet are injured. Diabetes also limits your body's ability to fight infection and get blood to areas that need it. If you get a minor foot injury, it could become an ulcer or a serious infection. With good foot care, you can prevent most of these problems. Caring for your feet can be quick and easy. Most of the care can be done when you are bathing or getting ready for bed. Follow-up care is a key part of your treatment and safety. Be sure to make and go to all appointments, and call your doctor if you are having problems. It's also a good idea to know your test results and keep a list of the medicines you take. How can you care for yourself at home? · Keep your blood sugar close to normal by watching what and how much you eat, monitoring blood sugar, taking medicines if prescribed, and getting regular exercise. · Do not smoke. Smoking affects blood flow and can make foot problems worse. If you need help quitting, talk to your doctor about stop-smoking programs and medicines. These can increase your chances of quitting for good. · Eat a diet that is low in fats. High fat intake can cause fat to build up in your blood vessels and decrease blood flow. · Inspect your feet daily for blisters, cuts, cracks, or sores. If you cannot see well, use a mirror or have someone help you. · Take care of your feet: 
? Wash your feet every day. Use warm (not hot) water. Check the water temperature with your wrists or other part of your body, not your feet. ? Dry your feet well. Pat them dry. Do not rub the skin on your feet too hard. Dry well between your toes. If the skin on your feet stays moist, bacteria or a fungus can grow, which can lead to infection. ? Keep your skin soft. Use moisturizing skin cream to keep the skin on your feet soft and prevent calluses and cracks. But do not put the cream between your toes, and stop using any cream that causes a rash. ? Clean underneath your toenails carefully. Do not use a sharp object to clean underneath your toenails. Use the blunt end of a nail file or other rounded tool. ? Trim and file your toenails straight across to prevent ingrown toenails. Use a nail clipper, not scissors. Use an emery board to smooth the edges. · Change socks daily. Socks without seams are best, because seams often rub the feet. You can find socks for people with diabetes from specialty catalogs. · Look inside your shoes every day for things like gravel or torn linings, which could cause blisters or sores. · Buy shoes that fit well: 
? Look for shoes that have plenty of space around the toes. This helps prevent bunions and blisters. ? Try on shoes while wearing the kind of socks you will usually wear with the shoes. ? Avoid plastic shoes. They may rub your feet and cause blisters. Good shoes should be made of materials that are flexible and breathable, such as leather or cloth. ? Break in new shoes slowly by wearing them for no more than an hour a day for several days. Take extra time to check your feet for red areas, blisters, or other problems after you wear new shoes. · Do not go barefoot. Do not wear sandals, and do not wear shoes with very thin soles. Thin soles are easy to puncture. They also do not protect your feet from hot pavement or cold weather. · Have your doctor check your feet during each visit. If you have a foot problem, see your doctor. Do not try to treat an early foot problem at home. Home remedies or treatments that you can buy without a prescription (such as corn removers) can be harmful. · Always get early treatment for foot problems. A minor irritation can lead to a major problem if not properly cared for early. When should you call for help? Call your doctor now or seek immediate medical care if: 
  · You have a foot sore, an ulcer or break in the skin that is not healing after 4 days, bleeding corns or calluses, or an ingrown toenail.  
  · You have blue or black areas, which can mean bruising or blood flow problems.  
  · You have peeling skin or tiny blisters between your toes or cracking or oozing of the skin.  
  · You have a fever for more than 24 hours and a foot sore.  
  · You have new numbness or tingling in your feet that does not go away after you move your feet or change positions.  
  · You have unexplained or unusual swelling of the foot or ankle.  
 Watch closely for changes in your health, and be sure to contact your doctor if: 
  · You cannot do proper foot care. Where can you learn more? Go to http://sandra-jenelle.info/. Enter A739 in the search box to learn more about \"Diabetes Foot Health: Care Instructions. \" Current as of: December 7, 2017 Content Version: 11.8 © 3245-9876 [employer]. Care instructions adapted under license by [employer] (which disclaims liability or warranty for this information). If you have questions about a medical condition or this instruction, always ask your healthcare professional. [address] any warranty or liability for your use of this information.

## 2018-10-31 LAB
ALBUMIN/CREAT UR: 3.1 MG/G CREAT (ref 0–30)
CHOLEST SERPL-MCNC: 76 MG/DL (ref 100–199)
CREAT UR-MCNC: 119.7 MG/DL
EST. AVERAGE GLUCOSE BLD GHB EST-MCNC: 143 MG/DL
HBA1C MFR BLD: 6.6 % (ref 4.8–5.6)
HDLC SERPL-MCNC: 25 MG/DL
LDLC SERPL CALC-MCNC: 41 MG/DL (ref 0–99)
MICROALBUMIN UR-MCNC: 3.7 UG/ML
TRIGL SERPL-MCNC: 48 MG/DL (ref 0–149)
VLDLC SERPL CALC-MCNC: 10 MG/DL (ref 5–40)

## 2018-10-31 NOTE — PROGRESS NOTES
Diabetes well controlled, a1c 6.6 for average sugar of 143. Cholesterol levels look good as well. No new recs.

## 2018-11-01 NOTE — PROGRESS NOTES
I have attempted to contact this patient by phone with the following results:  
Diabetes well controlled, a1c 6.6 for average sugar of 143. Cholesterol levels look good as well. No new recommendations. Results mailed to patient's home.

## 2018-11-15 ENCOUNTER — PATIENT OUTREACH (OUTPATIENT)
Dept: OTHER | Age: 63
End: 2018-11-15

## 2018-11-15 NOTE — PROGRESS NOTES
CCM Progress Note:   
 
1:15pm  Called patient for Care Management at agreed time. Unable to leave VM. Mail box full message when called. * Noted Next attempt if no return call will be 11/29 Chart Review:   
New A1C:  10/30   Dropped from 7. 6! Component Value Flag Ref Range Units Status Hemoglobin A1c 6.6 Abnormally high   H  4.8 - 5.6 % Final  
Comment: PCP visit 10/30 Visit Vitals /75 (BP 1 Location: Left arm, BP Patient Position: Sitting) Pulse 72 Temp 97.6 °F (36.4 °C) (Oral) Resp 16 Ht 5' 8\" (1.727 m) Wt 184 lb (83.5 kg) SpO2 96% BMI 27.98 kg/m² Assessment/Plan: 
  
1.  Dm, type 2--check a1c, urine micro. Last a1c 7.6. On glipizide now, but only when am fasting > 150 2.  htn--controlled with altace 3. pafib--remains nsr sp ablation 4. Hx cholangiocarcinoma--doing well sp whipple, but does need procedure next month to put stent in pancreatic duct 5. Hx ICH--spontaneous, when on coumadin. No residual deficits 6. Hx pancreatitis--was thought due to glucophage, but seems more likely due to duct scarring. Would like to resume glucophage in future, as it is much better med for dm than is glipizide. 
  
rtc 6 months

## 2018-11-18 RX ORDER — GABAPENTIN 300 MG/1
CAPSULE ORAL
Qty: 540 CAP | Refills: 3 | Status: SHIPPED | OUTPATIENT
Start: 2018-11-18 | End: 2019-05-15 | Stop reason: SDUPTHER

## 2018-11-19 ENCOUNTER — APPOINTMENT (OUTPATIENT)
Dept: INFUSION THERAPY | Age: 63
End: 2018-11-19

## 2018-11-28 ENCOUNTER — HOSPITAL ENCOUNTER (OUTPATIENT)
Age: 63
Setting detail: OUTPATIENT SURGERY
Discharge: HOME | End: 2018-11-28
Attending: INTERNAL MEDICINE | Admitting: INTERNAL MEDICINE
Payer: COMMERCIAL

## 2018-11-28 ENCOUNTER — ANESTHESIA EVENT (OUTPATIENT)
Dept: ENDOSCOPY | Age: 63
End: 2018-11-28
Payer: COMMERCIAL

## 2018-11-28 ENCOUNTER — APPOINTMENT (OUTPATIENT)
Dept: GENERAL RADIOLOGY | Age: 63
End: 2018-11-28
Attending: INTERNAL MEDICINE
Payer: COMMERCIAL

## 2018-11-28 ENCOUNTER — ANESTHESIA (OUTPATIENT)
Dept: ENDOSCOPY | Age: 63
End: 2018-11-28
Payer: COMMERCIAL

## 2018-11-28 VITALS
RESPIRATION RATE: 11 BRPM | SYSTOLIC BLOOD PRESSURE: 118 MMHG | OXYGEN SATURATION: 94 % | DIASTOLIC BLOOD PRESSURE: 67 MMHG | BODY MASS INDEX: 27.89 KG/M2 | HEART RATE: 72 BPM | HEIGHT: 68 IN | WEIGHT: 184 LBS | TEMPERATURE: 98 F

## 2018-11-28 PROCEDURE — C1726 CATH, BAL DIL, NON-VASCULAR: HCPCS | Performed by: INTERNAL MEDICINE

## 2018-11-28 PROCEDURE — 74011636320 HC RX REV CODE- 636/320: Performed by: INTERNAL MEDICINE

## 2018-11-28 PROCEDURE — 77030026438 HC STYL ET INTUB CARD -A: Performed by: ANESTHESIOLOGY

## 2018-11-28 PROCEDURE — 77030008684 HC TU ET CUF COVD -B: Performed by: ANESTHESIOLOGY

## 2018-11-28 PROCEDURE — 74011000250 HC RX REV CODE- 250

## 2018-11-28 PROCEDURE — 77030007288 HC DEV LOK BILI BSC -A: Performed by: INTERNAL MEDICINE

## 2018-11-28 PROCEDURE — 76060000033 HC ANESTHESIA 1 TO 1.5 HR: Performed by: INTERNAL MEDICINE

## 2018-11-28 PROCEDURE — 77030018712 HC DEV BLLN INFL BSC -B: Performed by: INTERNAL MEDICINE

## 2018-11-28 PROCEDURE — 74011250636 HC RX REV CODE- 250/636

## 2018-11-28 PROCEDURE — 77030012596 HC SPHNTOM BILI BSC -E: Performed by: INTERNAL MEDICINE

## 2018-11-28 PROCEDURE — 76040000008: Performed by: INTERNAL MEDICINE

## 2018-11-28 PROCEDURE — 74011250637 HC RX REV CODE- 250/637: Performed by: INTERNAL MEDICINE

## 2018-11-28 RX ORDER — DEXTROMETHORPHAN/PSEUDOEPHED 2.5-7.5/.8
1.2 DROPS ORAL
Status: DISCONTINUED | OUTPATIENT
Start: 2018-11-28 | End: 2018-11-28 | Stop reason: HOSPADM

## 2018-11-28 RX ORDER — FLUMAZENIL 0.1 MG/ML
0.2 INJECTION INTRAVENOUS
Status: DISCONTINUED | OUTPATIENT
Start: 2018-11-28 | End: 2018-11-28 | Stop reason: HOSPADM

## 2018-11-28 RX ORDER — LIDOCAINE HYDROCHLORIDE 20 MG/ML
INJECTION, SOLUTION EPIDURAL; INFILTRATION; INTRACAUDAL; PERINEURAL AS NEEDED
Status: DISCONTINUED | OUTPATIENT
Start: 2018-11-28 | End: 2018-11-28 | Stop reason: HOSPADM

## 2018-11-28 RX ORDER — ATROPINE SULFATE 0.1 MG/ML
0.5 INJECTION INTRAVENOUS
Status: DISCONTINUED | OUTPATIENT
Start: 2018-11-28 | End: 2018-11-28 | Stop reason: HOSPADM

## 2018-11-28 RX ORDER — SODIUM CHLORIDE 0.9 % (FLUSH) 0.9 %
5-10 SYRINGE (ML) INJECTION EVERY 8 HOURS
Status: DISCONTINUED | OUTPATIENT
Start: 2018-11-28 | End: 2018-11-28 | Stop reason: HOSPADM

## 2018-11-28 RX ORDER — PHENYLEPHRINE HCL IN 0.9% NACL 0.4MG/10ML
SYRINGE (ML) INTRAVENOUS AS NEEDED
Status: DISCONTINUED | OUTPATIENT
Start: 2018-11-28 | End: 2018-11-28 | Stop reason: HOSPADM

## 2018-11-28 RX ORDER — EPINEPHRINE 0.1 MG/ML
1 INJECTION INTRACARDIAC; INTRAVENOUS
Status: DISCONTINUED | OUTPATIENT
Start: 2018-11-28 | End: 2018-11-28 | Stop reason: HOSPADM

## 2018-11-28 RX ORDER — SUCCINYLCHOLINE CHLORIDE 20 MG/ML
INJECTION INTRAMUSCULAR; INTRAVENOUS AS NEEDED
Status: DISCONTINUED | OUTPATIENT
Start: 2018-11-28 | End: 2018-11-28 | Stop reason: HOSPADM

## 2018-11-28 RX ORDER — MIDAZOLAM HYDROCHLORIDE 1 MG/ML
.25-1 INJECTION, SOLUTION INTRAMUSCULAR; INTRAVENOUS
Status: DISCONTINUED | OUTPATIENT
Start: 2018-11-28 | End: 2018-11-28 | Stop reason: HOSPADM

## 2018-11-28 RX ORDER — SODIUM CHLORIDE 9 MG/ML
50 INJECTION, SOLUTION INTRAVENOUS CONTINUOUS
Status: DISCONTINUED | OUTPATIENT
Start: 2018-11-28 | End: 2018-11-28 | Stop reason: HOSPADM

## 2018-11-28 RX ORDER — ROCURONIUM BROMIDE 10 MG/ML
INJECTION, SOLUTION INTRAVENOUS AS NEEDED
Status: DISCONTINUED | OUTPATIENT
Start: 2018-11-28 | End: 2018-11-28 | Stop reason: HOSPADM

## 2018-11-28 RX ORDER — DEXAMETHASONE SODIUM PHOSPHATE 4 MG/ML
INJECTION, SOLUTION INTRA-ARTICULAR; INTRALESIONAL; INTRAMUSCULAR; INTRAVENOUS; SOFT TISSUE AS NEEDED
Status: DISCONTINUED | OUTPATIENT
Start: 2018-11-28 | End: 2018-11-28 | Stop reason: HOSPADM

## 2018-11-28 RX ORDER — PROPOFOL 10 MG/ML
INJECTION, EMULSION INTRAVENOUS AS NEEDED
Status: DISCONTINUED | OUTPATIENT
Start: 2018-11-28 | End: 2018-11-28 | Stop reason: HOSPADM

## 2018-11-28 RX ORDER — NALOXONE HYDROCHLORIDE 0.4 MG/ML
0.4 INJECTION, SOLUTION INTRAMUSCULAR; INTRAVENOUS; SUBCUTANEOUS
Status: DISCONTINUED | OUTPATIENT
Start: 2018-11-28 | End: 2018-11-28 | Stop reason: HOSPADM

## 2018-11-28 RX ORDER — FENTANYL CITRATE 50 UG/ML
100 INJECTION, SOLUTION INTRAMUSCULAR; INTRAVENOUS
Status: DISCONTINUED | OUTPATIENT
Start: 2018-11-28 | End: 2018-11-28 | Stop reason: HOSPADM

## 2018-11-28 RX ORDER — ONDANSETRON 2 MG/ML
INJECTION INTRAMUSCULAR; INTRAVENOUS AS NEEDED
Status: DISCONTINUED | OUTPATIENT
Start: 2018-11-28 | End: 2018-11-28 | Stop reason: HOSPADM

## 2018-11-28 RX ORDER — SODIUM CHLORIDE 0.9 % (FLUSH) 0.9 %
5-10 SYRINGE (ML) INJECTION AS NEEDED
Status: DISCONTINUED | OUTPATIENT
Start: 2018-11-28 | End: 2018-11-28 | Stop reason: HOSPADM

## 2018-11-28 RX ORDER — SODIUM CHLORIDE, SODIUM LACTATE, POTASSIUM CHLORIDE, CALCIUM CHLORIDE 600; 310; 30; 20 MG/100ML; MG/100ML; MG/100ML; MG/100ML
INJECTION, SOLUTION INTRAVENOUS
Status: DISCONTINUED | OUTPATIENT
Start: 2018-11-28 | End: 2018-11-28 | Stop reason: HOSPADM

## 2018-11-28 RX ADMIN — PROPOFOL 50 MG: 10 INJECTION, EMULSION INTRAVENOUS at 08:10

## 2018-11-28 RX ADMIN — LIDOCAINE HYDROCHLORIDE 60 MG: 20 INJECTION, SOLUTION EPIDURAL; INFILTRATION; INTRACAUDAL; PERINEURAL at 08:04

## 2018-11-28 RX ADMIN — DEXAMETHASONE SODIUM PHOSPHATE 4 MG: 4 INJECTION, SOLUTION INTRA-ARTICULAR; INTRALESIONAL; INTRAMUSCULAR; INTRAVENOUS; SOFT TISSUE at 08:19

## 2018-11-28 RX ADMIN — SUCCINYLCHOLINE CHLORIDE 140 MG: 20 INJECTION INTRAMUSCULAR; INTRAVENOUS at 08:04

## 2018-11-28 RX ADMIN — ONDANSETRON 4 MG: 2 INJECTION INTRAMUSCULAR; INTRAVENOUS at 08:58

## 2018-11-28 RX ADMIN — PROPOFOL 150 MG: 10 INJECTION, EMULSION INTRAVENOUS at 08:04

## 2018-11-28 RX ADMIN — SODIUM CHLORIDE, SODIUM LACTATE, POTASSIUM CHLORIDE, CALCIUM CHLORIDE: 600; 310; 30; 20 INJECTION, SOLUTION INTRAVENOUS at 07:55

## 2018-11-28 RX ADMIN — ROCURONIUM BROMIDE 5 MG: 10 INJECTION, SOLUTION INTRAVENOUS at 08:04

## 2018-11-28 NOTE — ANESTHESIA POSTPROCEDURE EVALUATION
Post-Anesthesia Evaluation and Assessment Patient: Amy Guo MRN: [id number]  SSN: [id number] YOB: 1955  Age: 59 y.o. Sex: male I have evaluated the patient and they are stable and ready for discharge from the PACU. Cardiovascular Function/Vital Signs Visit Vitals /70 Pulse 80 Temp 36.7 °C (98 °F) Resp 11 Ht 5' 8\" (1.727 m) Wt 83.5 kg (184 lb) SpO2 94% BMI 27.98 kg/m² Patient is status post MAC anesthesia for Procedure(s): ENDOSCOPIC RETROGRADE CHOLANGIOPANCREATOGRAPHY (ERCP) ESOPHAGEAL DILATION. Nausea/Vomiting: None Postoperative hydration reviewed and adequate. Pain: 
Pain Scale 1: Visual (11/28/18 0917) Pain Intensity 1: 0 (11/28/18 0917) Managed Neurological Status: At baseline Mental Status, Level of Consciousness: Alert and  oriented to person, place, and time Pulmonary Status:  
O2 Device: Room air (11/28/18 0924) Adequate oxygenation and airway patent Complications related to anesthesia: None Post-anesthesia assessment completed. No concerns Signed By: David Hassan MD   
 November 28, 2018 Procedure(s): ENDOSCOPIC RETROGRADE CHOLANGIOPANCREATOGRAPHY (ERCP) ESOPHAGEAL DILATION. <BSHSIANPOST> Visit Vitals /70 Pulse 80 Temp 36.7 °C (98 °F) Resp 11 Ht 5' 8\" (1.727 m) Wt 83.5 kg (184 lb) SpO2 94% BMI 27.98 kg/m²

## 2018-11-28 NOTE — ANESTHESIA PREPROCEDURE EVALUATION
Anesthetic History No history of anesthetic complications Review of Systems / Medical History Patient summary reviewed, nursing notes reviewed and pertinent labs reviewed Pulmonary Within defined limits Neuro/Psych Within defined limits Cardiovascular Hypertension Dysrhythmias : atrial fibrillation Comments: S/p afib ablation GI/Hepatic/Renal 
Within defined limits Endo/Other Within defined limits Other Findings Comments: [place] CA of CBD Physical Exam 
 
Airway Mallampati: II 
TM Distance: > 6 cm Neck ROM: normal range of motion Mouth opening: Normal 
 
 Cardiovascular Regular rate and rhythm,  S1 and S2 normal,  no murmur, click, rub, or gallop Dental 
No notable dental hx Pulmonary Breath sounds clear to auscultation Abdominal 
GI exam deferred Other Findings Anesthetic Plan ASA: 3 Anesthesia type: general 
 
 
 
 
Induction: Intravenous Anesthetic plan and risks discussed with: Patient

## 2018-11-28 NOTE — PERIOP NOTES
CRE balloon dilatation of the Pyloric Channel 15 mm Balloon inflated and held for 60 seconds. No subcutaneous crepitus of the chest or cervical region was noted post dilatation.

## 2018-11-28 NOTE — DISCHARGE INSTRUCTIONS
[address]  [address] [facility] [address]  [id number]  1955    DISCOMFORT:  Sore throat- throat lozenges or warm salt water gargle  redness at IV site- apply warm compress to area; if redness or soreness persist- contact your physician  Gaseous discomfort- walking, belching will help relieve any discomfort  You may not operate a vehicle for 12 hours  You may not engage in an occupation involving machinery or appliances for rest of today  You may not drink alcoholic beverages for at least 12 hours  Avoid making any critical decisions for at least 24 hour  DIET  You may eat and drink after you leave. You may resume your regular diet - however -  remember your colon is empty and a heavy meal will produce gas. Avoid these foods:  vegetables, fried / greasy foods, carbonated drinks    ACTIVITY  You may resume your normal daily activities   Spend the remainder of the day resting -  avoid any strenuous activity. CALL M.D. ANY SIGN OF   Increasing pain, nausea, vomiting  Abdominal distension (swelling)  New increased bleeding (oral or rectal)  Fever (chills)  Pain in chest area  Bloody discharge from nose or mouth  Shortness of breath    Follow-up Instructions:   Call Dr. Leandra Cantu for any questions or problems. If we took a biopsy please call the office within 2 weeks to discuss your                             pathology results. Telephone # [phone]        Continue same medications. Access Network Activation    Thank you for requesting access to Access Network. Please follow the instructions below to securely access and download your online medical record. Access Network allows you to send messages to your doctor, view your test results, renew your prescriptions, schedule appointments, and more. How Do I Sign Up? 1. In your internet browser, go to [email or web address]  2. Click on the First Time User?  Click Here link in the Sign In box. You will be redirect to the New Member Sign Up page. 3. Enter your Echo360 Access Code exactly as it appears below. You will not need to use this code after youve completed the sign-up process. If you do not sign up before the expiration date, you must request a new code. EMCAShart Access Code: Activation code not generated  Current Echo360 Status: Active (This is the date your Sonda41t access code will )    4. Enter the last four digits of your Social Security Number (xxxx) and Date of Birth (mm/dd/yyyy) as indicated and click Submit. You will be taken to the next sign-up page. 5. Create a Sonda41t ID. This will be your Echo360 login ID and cannot be changed, so think of one that is secure and easy to remember. 6. Create a Echo360 password. You can change your password at any time. 7. Enter your Password Reset Question and Answer. This can be used at a later time if you forget your password. 8. Enter your e-mail address. You will receive e-mail notification when new information is available in [address] 9. Click Sign Up. You can now view and download portions of your medical record. 10. Click the Download Summary menu link to download a portable copy of your medical information. Additional Information    If you have questions, please visit the Frequently Asked Questions section of the Echo360 website at [email or web address] Remember, Echo360 is NOT to be used for urgent needs. For medical emergencies, dial 911. Esophageal Dilation: What to Expect at [facility]  After you have esophageal dilation, you will stay at the hospital or surgery center for 1 to 2 hours. This will allow the medicine to wear off. You will be able to go home after your doctor or nurse checks to make sure you are not having any problems. This care sheet gives you a general idea about how long it will take for you to recover.  But each person recovers at a different pace. Follow the steps below to get better as quickly as possible. How can you care for yourself at home? Activity    · Rest as much as you need to after you go home.     · You should be able to go back to your usual activities the day after the procedure. Diet    · Follow your doctor's directions for eating after the procedure.     · Drink plenty of fluids (unless your doctor has told you not to). Medicines    · Your doctor will tell you if and when you can restart your medicines. He or she will also give you instructions about taking any new medicines.     · If you take blood thinners, such as warfarin (Coumadin), clopidogrel (Plavix), or aspirin, be sure to talk to your doctor. He or she will tell you if and when to start taking those medicines again. Make sure that you understand exactly what your doctor wants you to do.     · If you have a sore throat the day after the procedure, use an over-the-counter spray to numb your throat. Sucking on throat lozenges and gargling with warm salt water may also help relieve your symptoms. Follow-up care is a key part of your treatment and safety. Be sure to make and go to all appointments, and call your doctor if you are having problems. It's also a good idea to know your test results and keep a list of the medicines you take. When should you call for help? Call 911 anytime you think you may need emergency care.  For example, call if:    · You passed out (lost consciousness).     · You have trouble breathing.     · Your stools are maroon or very bloody    Call your doctor now or seek immediate medical care if:    · You have new or worse belly pain.     · You have a fever.     · You have new or more blood in your stools.     · You are sick to your stomach and cannot drink fluids.     · You cannot pass stools or gas.     · You have pain that does not get better after you take pain medicine.    Watch closely for changes in your health, and be sure to contact your doctor if:    · Your throat still hurts after a day or two.     · You do not get better as expected. Where can you learn more? Go to http://sandra-jenelle.info/. Enter B740 in the search box to learn more about \"Esophageal Dilation: What to Expect at Home. \"  Current as of: March 28, 2018  Content Version: 11.8  © 1132-6950 [employer]. Care instructions adapted under license by [employer] (which disclaims liability or warranty for this information). If you have questions about a medical condition or this instruction, always ask your healthcare professional. [place] [address] any warranty or liability for your use of this information.

## 2018-11-28 NOTE — PROGRESS NOTES
Kenia Counter 
1955 
[id number] Situation: 
Verbal report received from:toño Procedure: Procedure(s): ENDOSCOPIC RETROGRADE CHOLANGIOPANCREATOGRAPHY (ERCP) ESOPHAGEAL DILATION Background: 
 
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 Pyloric Stricture- dilated 2. Unable to find Pancreatic anastomosis :  Dr. Leandra Cantu 
Assistant(s): Endoscopy Technician-1: Jeffry Ruiz Endoscopy RN-1: Beryle Moat, RN Specimens: * No specimens in log * H. Pylori  no Assessment: 
Intra-procedure medications Anesthesia gave intra-procedure sedation and medications, see anesthesia flow sheet yes Intravenous fluids: NS@ Robert Dang Vital signs stable Abdominal assessment: round and soft Recommendation: 
Discharge patient per MD order Family or Friend Permission to share finding with family or friend yes

## 2018-11-28 NOTE — PERIOP NOTES

## 2018-11-28 NOTE — PROCEDURES
[address]  [address], [address]  [phone]                   ERCP NOTE    NAME:  Michelle Andrade   :   1955   MRN:   [id number]     Date/Time:  2018 9:11 AM    Procedure Type:   ERCPwith duodenal balloon dilation     Indications: abnormal CT/MRCP with pancreatic anastomotic stricture, acute pancreatitis  Pre-operative Diagnosis: see indication above  Post-operative Diagnosis:  See findings below  : Gibson Bejarano MD    Referring Provider:     Rose Cook DO    Sedation:  General anesthesia    Procedure Details:  After informed consent was obtained with all risks and benefits of procedure explained, the patient was taken to the fluoroscopy suite and placed in the prone position. Upon sequential sedation as per above, the Olympus duodenoscope [id number]   was inserted via the mouthpeice and carefully advanced to the second portion of the duodenum. The quality of visualization was good. The duodenoscope was withdrawn into a short position. Findings:   Esophagus:normal  Stomach: Pyloric stricture was noted. No ulcer or mass was noted. This was traversed  Duodenum/jejunum: Pyloric preserving Whipple was noted in duodenal bulb. Afferent limb was entered. Biliary anastomosis was identified. Ampulla: surgically absent  Cholangiogram: -Bile duct was selectively cannulated using Dreamwire. Contrast was injected. Biliary anastomosis was healthy. The extrahepatic and intrahepatic bile ducts were normal   Pancreatogram:not performed    Specimen Removed: * No specimens in log *    Complications: None. EBL:  None.     Interventions:    Pancreatic: Canulation  not attempted as pancreatico-duodenal anastomosis was not identified  Biliary: Canulation  successful  Pyloric dilation was performed using 12-15 mm CRE balloon    Impression:    -Pyloric stenosis-dilated  -Pancreatic anastomosis- not identified    Recommendations:    Clear liquid diet and advance as tolerated. Resume normal medication(s). Monitor clinical course.  If he has recurrent pancreatitis, consider EUS guided rendezvous procedure vs Surgery  Follow up in office in 3 months      Discharge Disposition:  Home following recovery in [facility] Erica Barrios MD  11/28/2018  9:11 AM

## 2018-11-29 ENCOUNTER — PATIENT OUTREACH (OUTPATIENT)
Dept: OTHER | Age: 63
End: 2018-11-29

## 2018-11-29 NOTE — PROGRESS NOTES
[facility] progress note Called Mr. Cristina  at agreed time. Verified  and address for HIPAA security. Changes since last call include:   
 Med changes :  None Doctors appointments :  Upper endo yesterday. * Mr. Ariella Murphy reports that he is feeling great today. - No problems after procedure yesterday. * He reports that dilatation went well for esophagus, but pancreatic stent was not addressed.   
 - Dr. Benjamin Dials that cause for nausea is that pressure of bile build up with sudden release under pressure.    
 - Next upper endo another procedure will be attempted with different instrumentation. * Potential for pancreatitis is present.   
 - Nausea and vomiting intermittent with typical problems every 3-4 days. - Mindful of diet/  No fried foods/ little red meat and cut back on carbohydrates. - A1C down/ CM congratulates patient on his progress. * CM also advises hydration to assist gastric acid buildup.   
 - He knows that he forgets to drink water while working/  \"I'll work on that. \" Check in for the patients goals:   
1) Goal    :   Oncology support/  PCP apts. A) Progress -  Stable. B) Barriers addressed     - ongoing Nausea/ vomiting/ inability to place pancreatic stent. C) Utilizing strategy - diet control/ and retry stent in 3 months. D) Plan for next check in   - 3 weeks/  Work toward CM closure. Advanced Directive:  Materials sent ; Will and ACP in progress per Oct call. Patient's current stage of activation:    
DM/  Oncology support. Action-  Ongoing support;  Reinforcement of change attempts; Expect set-backs;  Rolling with resistance; OARS Maintenance - Developing a plan for maintenance and relapse prevention. Patient verbalized understanding of all information discussed. Pt has my contact information for any further questions, concerns, or needs. Plan for next call:

## 2018-12-05 NOTE — H&P
[address] 
[address] [facility] [address] [place], [address]  
[phone] History and Physical  
 
NAME: Chuy De Anda :  1955 MRN:  [id number] HPI:  The patient was seen and examined. Past Surgical History:  
Procedure Laterality Date  ABDOMEN SURGERY PROC UNLISTED  10/2017 Whipple  HX GI    
 COLONOSCOPY, POLYPS (BENIGN)  HX GI  10/06/2017 Viktor Gresham Scientologist [address] [facility] [address]  2005 Ablation [address] Spinal fusion W/ HARDWARE  
 HX OTHER SURGICAL  2017 [place] Tate, Dr. Polanco [facility] VASCULAR ACCESS  2017 PORTACATH - then removed  NEUROLOGICAL PROCEDURE UNLISTED   [place] hole washout from cerebral hemorrhage Past Medical History:  
Diagnosis Date  Arrhythmia Previous a.fib, ablation, NSR now; NO LONGER FOLLOWED BY CARDIOLOGIST.  Autoimmune disease ([facility] [address] SJOGREN'S  
 Cancer ([facility] [address] COMMON BILE DUCT, ADENOCARCINOMA  Hypertension  Ill-defined condition   
 pre-diabetic - diet controlled  Sepsis ([facility] [address] 2017 STENTING TO BILE DUCT  Status post chemotherapy  Stroke [facility]) 2008  
 brain aneurysm - no deficits Social History Tobacco Use  Smoking status: Never Smoker  Smokeless tobacco: Never Used Substance Use Topics  Alcohol use: Yes Comment: very rare  Drug use: No  
 
Allergies Allergen Reactions  Shellfish Derived Anaphylaxis Soft shell crabs  Pcn [Penicillins] Other (comments) Pt stated \"always been told PCN\" Family History Problem Relation Age of Onset  Cancer Father Breast and Colon  Cancer Mother LEUKEMIA  
 No Known Problems Sister  No Known Problems Brother  No Known Problems Sister  Anesth Problems Neg Hx No current facility-administered medications for this encounter. Current Outpatient Medications Medication Sig  
  gabapentin (NEURONTIN) 300 mg capsule TAKE 3 CAPS BY MOUTH TWO (2) TIMES A DAY.  cyanocobalamin (VITAMIN B12) 1,000 mcg/mL injection INJECT CONTENTS OF ONE VIAL INTRAMUSCULARLY EVERY OTHER WEEK  
 glipiZIDE (GLUCOTROL) 5 mg tablet Take 1 Tab by mouth daily. Take only when am sugar is greater than 150, and then make sure to eat.  gabapentin (NEURONTIN) 300 mg capsule Take 900 mg by mouth two (2) times a day. 3 x 300mg BID  alpha-D-galactosidase (BEANO PO) Take 1 Tab by mouth two (2) times a day.  calcium carbonate (TUMS) 200 mg calcium (500 mg) chew Take 2 Tabs by mouth three (3) times daily as needed (indigestion).  pantoprazole (PROTONIX) 40 mg tablet TAKE 1 BY MOUTH EVERY MORNING FOR 30 DAYS  cholecalciferol, vitamin D3, (VITAMIN D3 PO) Take 1 Tab by mouth daily.  ondansetron (ZOFRAN ODT) 8 mg disintegrating tablet Take 8 mg by mouth every eight (8) hours as needed for Nausea.  ramipril (ALTACE) 10 mg capsule Take 1 Cap by mouth nightly.  cetirizine (ZYRTEC) 10 mg tablet Take 10 mg by mouth nightly. PHYSICAL EXAM: 
General: WD, WN. Alert, cooperative, no acute distress   
HEENT: NC, Atraumatic. PERRLA, EOMI. Anicteric sclerae. Lungs:  CTA Bilaterally. No Wheezing/Rhonchi/Rales. Heart:  Regular  rhythm,  No murmur, No Rubs, No Gallops Abdomen: Soft, Non distended, Non tender.  +Bowel sounds, no HSM Extremities: No c/c/e Neurologic:  CN 2-12 gi, Alert and oriented X 3. No acute neurological distress Psych:   Good insight. Not anxious nor agitated. The heart, lungs and mental status were satisfactory for the administration of GA and for the procedure. Mallampati score: 3 Assessment:  
· Dilated PD Plan:  
· Endoscopic procedure · GA

## 2018-12-08 ENCOUNTER — HOSPITAL ENCOUNTER (OUTPATIENT)
Dept: CT IMAGING | Age: 63
Discharge: HOME | End: 2018-12-08
Attending: INTERNAL MEDICINE
Payer: COMMERCIAL

## 2018-12-08 DIAGNOSIS — K85.90 ACUTE PANCREATITIS: ICD-10-CM

## 2018-12-08 DIAGNOSIS — R11.2 NAUSEA WITH VOMITING: ICD-10-CM

## 2018-12-08 LAB — CREAT BLD-MCNC: 1 MG/DL (ref 0.6–1.3)

## 2018-12-08 PROCEDURE — 74011636320 HC RX REV CODE- 636/320: Performed by: INTERNAL MEDICINE

## 2018-12-08 PROCEDURE — 74160 CT ABDOMEN W/CONTRAST: CPT

## 2018-12-08 PROCEDURE — 74011250636 HC RX REV CODE- 250/636: Performed by: INTERNAL MEDICINE

## 2018-12-08 PROCEDURE — 82565 ASSAY OF CREATININE: CPT

## 2018-12-08 RX ORDER — SODIUM CHLORIDE 0.9 % (FLUSH) 0.9 %
10 SYRINGE (ML) INJECTION
Status: COMPLETED | OUTPATIENT
Start: 2018-12-08 | End: 2018-12-08

## 2018-12-08 RX ORDER — SODIUM CHLORIDE 9 MG/ML
50 INJECTION, SOLUTION INTRAVENOUS
Status: COMPLETED | OUTPATIENT
Start: 2018-12-08 | End: 2018-12-08

## 2018-12-08 RX ADMIN — SODIUM CHLORIDE 50 ML/HR: 900 INJECTION, SOLUTION INTRAVENOUS at 10:15

## 2018-12-08 RX ADMIN — IOPAMIDOL 100 ML: 755 INJECTION, SOLUTION INTRAVENOUS at 10:15

## 2018-12-08 RX ADMIN — Medication 10 ML: at 10:15

## 2018-12-20 ENCOUNTER — PATIENT OUTREACH (OUTPATIENT)
Dept: OTHER | Age: 63
End: 2018-12-20
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
## 2019-01-04 RX ORDER — RAMIPRIL 10 MG/1
10 CAPSULE ORAL
Qty: 90 CAP | Refills: 3 | Status: SHIPPED | OUTPATIENT
Start: 2019-01-04 | End: 2020-03-29

## 2019-01-14 ENCOUNTER — OFFICE VISIT (OUTPATIENT)
Dept: INTERNAL MEDICINE CLINIC | Age: 64
End: 2019-01-14

## 2019-01-14 ENCOUNTER — TELEPHONE (OUTPATIENT)
Dept: INTERNAL MEDICINE CLINIC | Age: 64
End: 2019-01-14

## 2019-01-14 VITALS
WEIGHT: 189.6 LBS | DIASTOLIC BLOOD PRESSURE: 74 MMHG | HEART RATE: 66 BPM | BODY MASS INDEX: 28.73 KG/M2 | SYSTOLIC BLOOD PRESSURE: 115 MMHG | OXYGEN SATURATION: 96 % | RESPIRATION RATE: 16 BRPM | HEIGHT: 68 IN | TEMPERATURE: 98.2 F

## 2019-01-14 DIAGNOSIS — E11.9 CONTROLLED TYPE 2 DIABETES MELLITUS WITHOUT COMPLICATION, WITHOUT LONG-TERM CURRENT USE OF INSULIN (HCC): Primary | ICD-10-CM

## 2019-01-14 DIAGNOSIS — I10 ESSENTIAL HYPERTENSION: ICD-10-CM

## 2019-01-14 DIAGNOSIS — I48.0 PAROXYSMAL ATRIAL FIBRILLATION (HCC): ICD-10-CM

## 2019-01-14 DIAGNOSIS — C24.9 CHOLANGIOCARCINOMA DETERMINED BY BIOPSY OF BILIARY TRACT (HCC): ICD-10-CM

## 2019-01-14 RX ORDER — SILDENAFIL 50 MG/1
50 TABLET, FILM COATED ORAL AS NEEDED
Qty: 20 TAB | Refills: 1 | Status: SHIPPED | OUTPATIENT
Start: 2019-01-14 | End: 2019-05-06 | Stop reason: SDUPTHER

## 2019-01-14 RX ORDER — METFORMIN HYDROCHLORIDE 500 MG/1
500 TABLET, EXTENDED RELEASE ORAL 2 TIMES DAILY
Qty: 60 TAB | Refills: 11
Start: 2019-01-14 | End: 2019-09-17 | Stop reason: SDUPTHER

## 2019-01-14 RX ORDER — ONDANSETRON 8 MG/1
8 TABLET, ORALLY DISINTEGRATING ORAL
Qty: 30 TAB | Refills: 0 | Status: SHIPPED | OUTPATIENT
Start: 2019-01-14 | End: 2019-02-21

## 2019-01-14 NOTE — PROGRESS NOTES
Chantelle Marti is a 59 y.o. male who presents for evaluation of nausea and hyperglycemia. Last seen by me oct 30, 2018. Had stopped his glucophage due to gi distress, and switched him to glipizide. Sugars have been running higher than usual of late. He continues to have issues with his gi system, and is set to have another egd with dilation on jan 25. He struggles with n/v almost daily. He would like to go back on glucophage. He did have some increased abd pain for past 3 days, and wonders if it might have been mild pancreatitis. Pain has improved, as he has limited his diet. ROS: 
Constitutional: negative for fevers, chills, anorexia and weight loss Eyes:   negative for visual disturbance and irritation ENT:   negative for tinnitus,sore throat,nasal congestion,ear pain,hoarseness Respiratory:  negative for cough, hemoptysis, dyspnea,wheezing CV:   negative for chest pain, palpitations, lower extremity edema GI:   negative for  diarrhea, abdominal pain,melena.  ++n/v 
Genitourinary: negative for frequency, dysuria and hematuria Musculoskel: negative for myalgias, arthralgias, back pain, muscle weakness, joint pain Neurological:  negative for headaches, dizziness, focal weakness, numbness Psychiatric:     Negative for depression or anxiety Past Medical History:  
Diagnosis Date  Arrhythmia Previous a.fib, ablation, NSR now; NO LONGER FOLLOWED BY CARDIOLOGIST.  Autoimmune disease ([address] SJOGREN'S  
 Cancer ([address] COMMON BILE DUCT, ADENOCARCINOMA  Hypertension  Ill-defined condition   
 pre-diabetic - diet controlled  Sepsis ([address] 2017 STENTING TO BILE DUCT  Status post chemotherapy  Stroke [facility]) 2008  
 brain aneurysm - no deficits Past Surgical History:  
Procedure Laterality Date  ABDOMEN SURGERY PROC UNLISTED  10/2017 Viktor  HX GI    
 COLONOSCOPY, POLYPS (BENIGN)  HX GI  10/06/2017  Viktor [facility]   
 [address] CATHETERIZATION  2005 [address]blation [address] Spinal fusion W/ HARDWARE  
 HX OTHER SURGICAL  11/07/2017 Dr. Leonie Paz VASCULAR ACCESS  2017 PORTACATH - then removed  NEUROLOGICAL PROCEDURE UNLISTED  2005 Ting hole washout from cerebral hemorrhage Family History Problem Relation Age of Onset  Cancer Father Breast and Colon  Cancer Mother LEUKEMIA  
 No Known Problems Sister  No Known Problems Brother  No Known Problems Sister  Anesth Problems Neg Hx Social History Socioeconomic History  Marital status:  Spouse name: Not on file  Number of children: Not on file  Years of education: Not on file  Highest education level: Not on file Social Needs  Financial resource strain: Not on file  Food insecurity - worry: Not on file  Food insecurity - inability: Not on file  Transportation needs - medical: Not on file  Transportation needs - non-medical: Not on file Occupational History  Not on file Tobacco Use  Smoking status: Never Smoker  Smokeless tobacco: Never Used Substance and Sexual Activity  Alcohol use: Yes Comment: very rare  Drug use: No  
 Sexual activity: Yes  
  Partners: Female Other Topics Concern  Not on file Social History Narrative  Not on file Visit Vitals /74 (BP 1 Location: Right arm, BP Patient Position: Sitting) Pulse 66 Temp 98.2 °F (36.8 °C) (Oral) Resp 16 Ht 5' 8\" (1.727 m) Wt 189 lb 9.6 oz (86 kg) SpO2 96% BMI 28.83 kg/m² Physical Examination:  
General - Well appearing male HEENT - PERRL, TM no erythema/opacification, normal nasal turbinates, no oropharyngeal erythema or exudate, MMM Neck - supple, no bruits, no thyroidomegaly, no lymphadenopathy Pulm - clear to auscultation bilaterally Cardio - RRR, normal S1 S2, no murmur Abd - soft, nontender, no masses, no HSM Extrem - no edema, +2 distal pulses Neuro-  No focal deficits, CN intact Assessment/Plan: 1.  Dm, type 2--stop glipizide. Resume glucophage xr 500 mg with dinner for 7 days, then to bid. 2.  htn--controlled with altace 3. pafib--remains nsr 5. Hx cholangiocarcinoma--sp whipple. Set to have repeat egd later this month 5. ED--rx given for viagra 
 
rtc 3 months, sooner if glucophage not tolerated.  
 
 
 
Ayala Dubonnet III, DO

## 2019-01-14 NOTE — TELEPHONE ENCOUNTER
[facility] Two pt identifiers confirmed. Jordan Contreras states that pt's glucose has been running elevated x2wks. Jordan Contreras states that this morning, glucose was 168. Jordan Contreras states that last week, it went as high as 258. Jordan Contreras states that pt's sugars are 120-higher. Pt off metformin but taking 5mg glipizide. Jordan Contreras asking if pt can be seen today. Hyun informed per Dr. Swathi Jordan that pt can be worked in this morning-unsure of the time, though before lunch. Jordan Contreras states that pt will be in around 1030. Jordan Contreras verbalized understanding of information discussed w/ no further questions at this time.

## 2019-01-14 NOTE — PROGRESS NOTES
1. Have you been to the ER, urgent care clinic since your last visit? Hospitalized since your last visit? No 
 
2. Have you seen or consulted any other health care providers outside of the [address] [place] [address] since your last visit? Include any pap smears or colon screening.  No

## 2019-01-14 NOTE — PATIENT INSTRUCTIONS

## 2019-01-24 ENCOUNTER — ANESTHESIA EVENT (OUTPATIENT)
Dept: ENDOSCOPY | Age: 64
End: 2019-01-24
Payer: COMMERCIAL

## 2019-01-24 NOTE — ANESTHESIA PREPROCEDURE EVALUATION
Anesthetic History PONV Review of Systems / Medical History Patient summary reviewed, nursing notes reviewed and pertinent labs reviewed Pulmonary Within defined limits Neuro/Psych CVA Cardiovascular Hypertension Dysrhythmias : atrial fibrillation Exercise tolerance: >4 METS Comments: S/p ablation GI/Hepatic/Renal 
Within defined limits Endo/Other Diabetes Cancer Other Findings Comments:    Hx cholangiocarcinoma--sp whipple Physical Exam 
 
Airway Mallampati: II 
TM Distance: > 6 cm Neck ROM: normal range of motion Mouth opening: Normal 
 
 Cardiovascular Regular rate and rhythm,  S1 and S2 normal,  no murmur, click, rub, or gallop Dental 
No notable dental hx Pulmonary Breath sounds clear to auscultation Abdominal 
GI exam deferred Other Findings Anesthetic Plan ASA: 3 Anesthesia type: MAC Anesthetic plan and risks discussed with: Patient

## 2019-01-25 ENCOUNTER — HOSPITAL ENCOUNTER (OUTPATIENT)
Age: 64
Setting detail: OUTPATIENT SURGERY
Discharge: HOME | End: 2019-01-25
Attending: INTERNAL MEDICINE | Admitting: INTERNAL MEDICINE
Payer: COMMERCIAL

## 2019-01-25 ENCOUNTER — DOCUMENTATION ONLY (OUTPATIENT)
Dept: OTHER | Age: 64
End: 2019-01-25

## 2019-01-25 ENCOUNTER — ANESTHESIA (OUTPATIENT)
Dept: ENDOSCOPY | Age: 64
End: 2019-01-25
Payer: COMMERCIAL

## 2019-01-25 VITALS
TEMPERATURE: 96.8 F | RESPIRATION RATE: 15 BRPM | BODY MASS INDEX: 28.73 KG/M2 | HEART RATE: 69 BPM | SYSTOLIC BLOOD PRESSURE: 113 MMHG | OXYGEN SATURATION: 96 % | WEIGHT: 189.56 LBS | HEIGHT: 68 IN | DIASTOLIC BLOOD PRESSURE: 70 MMHG

## 2019-01-25 PROCEDURE — 76040000019: Performed by: INTERNAL MEDICINE

## 2019-01-25 PROCEDURE — 74011000258 HC RX REV CODE- 258

## 2019-01-25 PROCEDURE — 77030018712 HC DEV BLLN INFL BSC -B: Performed by: INTERNAL MEDICINE

## 2019-01-25 PROCEDURE — C1726 CATH, BAL DIL, NON-VASCULAR: HCPCS | Performed by: INTERNAL MEDICINE

## 2019-01-25 PROCEDURE — 74011250636 HC RX REV CODE- 250/636

## 2019-01-25 PROCEDURE — 76060000031 HC ANESTHESIA FIRST 0.5 HR: Performed by: INTERNAL MEDICINE

## 2019-01-25 RX ORDER — SODIUM CHLORIDE 0.9 % (FLUSH) 0.9 %
5-40 SYRINGE (ML) INJECTION AS NEEDED
Status: DISCONTINUED | OUTPATIENT
Start: 2019-01-25 | End: 2019-01-25 | Stop reason: HOSPADM

## 2019-01-25 RX ORDER — DEXTROMETHORPHAN/PSEUDOEPHED 2.5-7.5/.8
1.2 DROPS ORAL
Status: DISCONTINUED | OUTPATIENT
Start: 2019-01-25 | End: 2019-01-25 | Stop reason: HOSPADM

## 2019-01-25 RX ORDER — FENTANYL CITRATE 50 UG/ML
100 INJECTION, SOLUTION INTRAMUSCULAR; INTRAVENOUS
Status: DISCONTINUED | OUTPATIENT
Start: 2019-01-25 | End: 2019-01-25 | Stop reason: HOSPADM

## 2019-01-25 RX ORDER — EPINEPHRINE 0.1 MG/ML
1 INJECTION INTRACARDIAC; INTRAVENOUS
Status: DISCONTINUED | OUTPATIENT
Start: 2019-01-25 | End: 2019-01-25 | Stop reason: HOSPADM

## 2019-01-25 RX ORDER — SODIUM CHLORIDE 9 MG/ML
INJECTION, SOLUTION INTRAVENOUS
Status: DISCONTINUED | OUTPATIENT
Start: 2019-01-25 | End: 2019-01-25 | Stop reason: HOSPADM

## 2019-01-25 RX ORDER — LIDOCAINE HYDROCHLORIDE 20 MG/ML
INJECTION, SOLUTION EPIDURAL; INFILTRATION; INTRACAUDAL; PERINEURAL AS NEEDED
Status: DISCONTINUED | OUTPATIENT
Start: 2019-01-25 | End: 2019-01-25 | Stop reason: HOSPADM

## 2019-01-25 RX ORDER — MIDAZOLAM HYDROCHLORIDE 1 MG/ML
.25-1 INJECTION, SOLUTION INTRAMUSCULAR; INTRAVENOUS
Status: DISCONTINUED | OUTPATIENT
Start: 2019-01-25 | End: 2019-01-25 | Stop reason: HOSPADM

## 2019-01-25 RX ORDER — SODIUM CHLORIDE 9 MG/ML
50 INJECTION, SOLUTION INTRAVENOUS CONTINUOUS
Status: DISCONTINUED | OUTPATIENT
Start: 2019-01-25 | End: 2019-01-25 | Stop reason: HOSPADM

## 2019-01-25 RX ORDER — FLUMAZENIL 0.1 MG/ML
0.2 INJECTION INTRAVENOUS
Status: DISCONTINUED | OUTPATIENT
Start: 2019-01-25 | End: 2019-01-25 | Stop reason: HOSPADM

## 2019-01-25 RX ORDER — PROPOFOL 10 MG/ML
INJECTION, EMULSION INTRAVENOUS AS NEEDED
Status: DISCONTINUED | OUTPATIENT
Start: 2019-01-25 | End: 2019-01-25 | Stop reason: HOSPADM

## 2019-01-25 RX ORDER — ATROPINE SULFATE 0.1 MG/ML
0.5 INJECTION INTRAVENOUS
Status: DISCONTINUED | OUTPATIENT
Start: 2019-01-25 | End: 2019-01-25 | Stop reason: HOSPADM

## 2019-01-25 RX ORDER — NALOXONE HYDROCHLORIDE 0.4 MG/ML
0.4 INJECTION, SOLUTION INTRAMUSCULAR; INTRAVENOUS; SUBCUTANEOUS
Status: DISCONTINUED | OUTPATIENT
Start: 2019-01-25 | End: 2019-01-25 | Stop reason: HOSPADM

## 2019-01-25 RX ORDER — SODIUM CHLORIDE 0.9 % (FLUSH) 0.9 %
5-40 SYRINGE (ML) INJECTION EVERY 8 HOURS
Status: DISCONTINUED | OUTPATIENT
Start: 2019-01-25 | End: 2019-01-25 | Stop reason: HOSPADM

## 2019-01-25 RX ADMIN — PROPOFOL 200 MG: 10 INJECTION, EMULSION INTRAVENOUS at 08:57

## 2019-01-25 RX ADMIN — SODIUM CHLORIDE: 9 INJECTION, SOLUTION INTRAVENOUS at 08:57

## 2019-01-25 RX ADMIN — PROPOFOL 50 MG: 10 INJECTION, EMULSION INTRAVENOUS at 09:10

## 2019-01-25 RX ADMIN — LIDOCAINE HYDROCHLORIDE 60 MG: 20 INJECTION, SOLUTION EPIDURAL; INFILTRATION; INTRACAUDAL; PERINEURAL at 08:57

## 2019-01-25 NOTE — DISCHARGE INSTRUCTIONS
[address] [facility].  [address] [facility]  [id number]  1955    DISCOMFORT:  Sore throat- throat lozenges or warm salt water gargle  redness at IV site- apply warm compress to area; if redness or soreness persist- contact your physician  Gaseous discomfort- walking, belching will help relieve any discomfort  You may not operate a vehicle for 12 hours  You may not engage in an occupation involving machinery or appliances for rest of today  You may not drink alcoholic beverages for at least 12 hours  Avoid making any critical decisions for at least 24 hour  DIET  You may eat and drink after you leave. You may resume your regular diet - however -  remember your colon is empty and a heavy meal will produce gas. Avoid these foods:  vegetables, fried / greasy foods, carbonated drinks    ACTIVITY  You may resume your normal daily activities   Spend the remainder of the day resting -  avoid any strenuous activity. CALL M.D. ANY SIGN OF   Increasing pain, nausea, vomiting  Abdominal distension (swelling)  New increased bleeding (oral or rectal)  Fever (chills)  Pain in chest area  Bloody discharge from nose or mouth  Shortness of breath    Follow-up Instructions:   Call Dr. Junito Fowler for any questions or problems. If we took a biopsy please call the office within 2 weeks to discuss your                             pathology results. Telephone # [phone]        Continue same medications. No NSAIDS               Small frequent meals. Sit up 2-3 hours after eating to help with digestion      Patient Education        Esophageal Dilation: What to Expect at [address] [place] [address]  After you have esophageal dilation, you will stay at the hospital or surgery center for 1 to 2 hours. This will allow the medicine to wear off.  You will be able to go home after your doctor or nurse checks to make sure you are not having any problems. This care sheet gives you a general idea about how long it will take for you to recover. But each person recovers at a different pace. Follow the steps below to get better as quickly as possible. How can you care for yourself at home? Activity    · Rest as much as you need to after you go home.     · You should be able to go back to your usual activities the day after the procedure. Diet    · Follow your doctor's directions for eating after the procedure.     · Drink plenty of fluids (unless your doctor has told you not to). Medicines    · Your doctor will tell you if and when you can restart your medicines. He or she will also give you instructions about taking any new medicines.     · If you take blood thinners, such as warfarin (Coumadin), clopidogrel (Plavix), or aspirin, be sure to talk to your doctor. He or she will tell you if and when to start taking those medicines again. Make sure that you understand exactly what your doctor wants you to do.     · If you have a sore throat the day after the procedure, use an over-the-counter spray to numb your throat. Sucking on throat lozenges and gargling with warm salt water may also help relieve your symptoms. Follow-up care is a key part of your treatment and safety. Be sure to make and go to all appointments, and call your doctor if you are having problems. It's also a good idea to know your test results and keep a list of the medicines you take. When should you call for help? Call 911 anytime you think you may need emergency care.  For example, call if:    · You passed out (lost consciousness).     · You have trouble breathing.     · Your stools are maroon or very bloody    Call your doctor now or seek immediate medical care if:    · You have new or worse belly pain.     · You have a fever.     · You have new or more blood in your stools.     · You are sick to your stomach and cannot drink fluids.     · You cannot pass stools or gas.     · You have pain that does not get better after you take pain medicine.    Watch closely for changes in your health, and be sure to contact your doctor if:    · Your throat still hurts after a day or two.     · You do not get better as expected. Where can you learn more? Go to http://sandra-jenelle.info/. Enter D386 in the search box to learn more about \"Esophageal Dilation: What to Expect at Home. \"  Current as of: March 27, 2018  Content Version: 11.9  © 5101-0459 [employer]. Care instructions adapted under license by [employer] (which disclaims liability or warranty for this information). If you have questions about a medical condition or this instruction, always ask your healthcare professional. [address] any warranty or liability for your use of this information.

## 2019-01-25 NOTE — PROGRESS NOTES
Chart Review for OP procedure. CM review of chart find OP EGD with dilatation. No indication for CM call. Noted Office NN has CM episode open. EGD 1/25:     Therapies:  pyloric dilation with 15-18 mm CRE pyloric balloon successful

## 2019-01-25 NOTE — PERIOP NOTES

## 2019-01-25 NOTE — H&P
[address] 
[address] [facility] [address] [place], [address] 
[phone] History and Physical  
 
NAME: Adela DominguezB:  1955 MRN:  [id number] HPI:  The patient was seen and examined. Past Surgical History:  
Procedure Laterality Date  ABDOMEN SURGERY PROC UNLISTED  10/2017 Whipple  HX GI    
 COLONOSCOPY, POLYPS (BENIGN)  HX GI  10/06/2017 Viktor Marcum [address] [facility] [address]  2005 [address] Spinal fusion W/ HARDWARE  
 HX OTHER SURGICAL  2017 Jason Carver, Dr. Ana Laura Hermosillo VASCULAR ACCESS  2017 PORTACATH - then removed  NEUROLOGICAL PROCEDURE UNLISTED   Ting hole washout from cerebral hemorrhage Past Medical History:  
Diagnosis Date  Arrhythmia Previous a.fib, ablation, NSR now; NO LONGER FOLLOWED BY CARDIOLOGIST.  Autoimmune disease ([facility] SJOGREN'S  
 Cancer ([facility] COMMON BILE DUCT, ADENOCARCINOMA  Hypertension  Ill-defined condition   
 pre-diabetic - diet controlled  Sepsis ([facility] 2017 STENTING TO BILE DUCT  Status post chemotherapy  Stroke [facility])   
 brain aneurysm - no deficits Social History Tobacco Use  Smoking status: Never Smoker  Smokeless tobacco: Never Used Substance Use Topics  Alcohol use: Yes Comment: very rare  Drug use: No  
 
Allergies Allergen Reactions  Shellfish Derived Anaphylaxis Soft shell crabs  Pcn [Penicillins] Other (comments) Pt stated \"always been told PCN\" Family History Problem Relation Age of Onset  Cancer Father Breast and Colon  Cancer Mother LEUKEMIA  
 No Known Problems Sister  No Known Problems Brother  No Known Problems Sister  Anesth Problems Neg Hx No current facility-administered medications for this encounter.    
 
 
 
PHYSICAL EXAM: 
 General: WD, WN. Alert, cooperative, no acute distress   
HEENT: NC, Atraumatic. PERRLA, EOMI. Anicteric sclerae. Lungs:  CTA Bilaterally. No Wheezing/Rhonchi/Rales. Heart:  Regular  rhythm,  No murmur, No Rubs, No Gallops Abdomen: Soft, Non distended, Non tender.  +Bowel sounds, no HSM Extremities: No c/c/e Neurologic:  CN 2-12 gi, Alert and oriented X 3. No acute neurological distress Psych:   Good insight. Not anxious nor agitated. The heart, lungs and mental status were satisfactory for the administration of MAC sedation and for the procedure. Mallampati score: 3 Assessment:  
· Anastomotic stricture Plan:  
· Endoscopic procedure · MAC sedation ·

## 2019-01-25 NOTE — ROUTINE PROCESS
[facility] 
1955 
[id number] Situation: 
Verbal report received from: [facility] Procedure: Procedure(s): ESOPHAGOGASTRODUODENOSCOPY (EGD) ESOPHAGEAL DILATION Background: 
 
Preoperative diagnosis: Abdominal pain Postoperative diagnosis: Anosamotic Stricture Irregular Z :  Dr. Aileen Ferguson 
Assistant(s): Endoscopy Technician-1: Claudio Robbins Endoscopy RN-1: Jakub Durbin RN Specimens: * No specimens in log * H. Pylori  no Assessment: 
Intra-procedure medications Anesthesia gave intra-procedure sedation and medications, see anesthesia flow sheet yes Intravenous fluids: NS@ Kermitt Apgar Vital signs stable yes Abdominal assessment: round and soft  Yes Recommendation: 
Discharge patient per MD order yes Return to floor na Family or Friend  fam 
Permission to share finding with family or friend yes

## 2019-01-25 NOTE — PERIOP NOTES
CRE balloon dilatation of the esophagus 15 mm Balloon inflated to 3 ATMs and held for 10 seconds. 16.5 mm Balloon inflated to 4.5 ATMs and held for 20 seconds. 18 mm Balloon inflated to 7 ATMs and held for 60 seconds. No subcutaneous crepitus of the chest or cervical region was noted post dilatation.

## 2019-01-25 NOTE — PROCEDURES
[address] [facility].  [address] [facility] [address], [address]   [phone]                            NAME:  Gerber Chapin   :   1955   MRN:   [id number]     Date/Time:  2019 9:23 AM    Esophagogastroduodenoscopy (EGD) Procedure Note    :  Lenora Garcia MD    Referring Provider:  Bib Bai DO    Anethesia/Sedation:  MAC anesthesia    Procedure Details     After infom consent was obtained for the procedure, with all risks and benefits of procedure explained the patient was taken to the endoscopy suite and placed in the left lateral decubitus position. Following sequential administration of sedation as per above, the [id number] gastroscope was inserted into the mouth and advanced under direct vision to proximal jejunum. A careful inspection was made as the gastroscope was withdrawn, including a retroflexed view of the proximal stomach; findings and interventions are described below. Findings:  Esophagus:normal  Stomach: Lot of bile was seen in the stomach. The pylorus was displaced to the side. Pyloric stenosis was noted. No ulcer or mass was noted. This was traversed  Duodenum/jejunum: Pyloric preserving Whipple was noted in duodenal bulb. Afferent limb was entered. Biliary anastomosis was identified. Therapies:  pyloric dilation with 15-18 mm CRE pyloric balloon successful    Specimens: none           EBL: None    Complications:   None; patient tolerated the procedure well. Impression:    See Postoperative diagnosis above    Recommendations:  -Continue acid suppression. , -Follow symptoms. , -Small frequent meals, -No NSAIDS, -Consider adding Cholestyramine as bile reflux could be causing symptoms as well    Discharge disposition:  Home in the company of  when able to ambulate    Lenora Garcia MD

## 2019-01-25 NOTE — ANESTHESIA POSTPROCEDURE EVALUATION
Post-Anesthesia Evaluation and Assessment Patient: Anjum Monreal MRN: [id number]  SSN: [id number] YOB: 1955  Age: 59 y.o. Sex: male I have evaluated the patient and they are stable and ready for discharge from the PACU. Cardiovascular Function/Vital Signs Visit Vitals /70 Pulse 69 Temp 36 °C (96.8 °F) Resp 15 Ht 5' 8\" (1.727 m) Wt 86 kg (189 lb 9 oz) SpO2 96% BMI 28.82 kg/m² Patient is status post MAC anesthesia for Procedure(s): ESOPHAGOGASTRODUODENOSCOPY (EGD) ESOPHAGEAL DILATION. Nausea/Vomiting: None Postoperative hydration reviewed and adequate. Pain: 
Pain Scale 1: Numeric (0 - 10) (01/25/19 0946) Pain Intensity 1: 0 (01/25/19 0946) Managed Neurological Status: At baseline Mental Status, Level of Consciousness: Alert and  oriented to person, place, and time Pulmonary Status:  
O2 Device: Room air (01/25/19 0946) Adequate oxygenation and airway patent Complications related to anesthesia: None Post-anesthesia assessment completed. No concerns Signed By: Morena Kincaid MD   
 January 25, 2019 Procedure(s): ESOPHAGOGASTRODUODENOSCOPY (EGD) ESOPHAGEAL DILATION. <BSHSIANPOST> Visit Vitals /70 Pulse 69 Temp 36 °C (96.8 °F) Resp 15 Ht 5' 8\" (1.727 m) Wt 86 kg (189 lb 9 oz) SpO2 96% BMI 28.82 kg/m²

## 2019-02-21 ENCOUNTER — APPOINTMENT (OUTPATIENT)
Dept: CT IMAGING | Age: 64
End: 2019-02-21
Attending: EMERGENCY MEDICINE
Payer: COMMERCIAL

## 2019-02-21 ENCOUNTER — HOSPITAL ENCOUNTER (EMERGENCY)
Age: 64
Discharge: HOME | End: 2019-02-21
Attending: EMERGENCY MEDICINE
Payer: COMMERCIAL

## 2019-02-21 VITALS
DIASTOLIC BLOOD PRESSURE: 86 MMHG | BODY MASS INDEX: 28.33 KG/M2 | WEIGHT: 186.95 LBS | RESPIRATION RATE: 20 BRPM | TEMPERATURE: 97.6 F | HEART RATE: 71 BPM | OXYGEN SATURATION: 94 % | HEIGHT: 68 IN | SYSTOLIC BLOOD PRESSURE: 126 MMHG

## 2019-02-21 DIAGNOSIS — N20.0 KIDNEY STONE: Primary | ICD-10-CM

## 2019-02-21 LAB
ALBUMIN SERPL-MCNC: 4 G/DL (ref 3.5–5)
ALBUMIN/GLOB SERPL: 1.3 {RATIO} (ref 1.1–2.2)
ALP SERPL-CCNC: 136 U/L (ref 45–117)
ALT SERPL-CCNC: 38 U/L (ref 12–78)
ANION GAP SERPL CALC-SCNC: 9 MMOL/L (ref 5–15)
APPEARANCE UR: CLEAR
AST SERPL-CCNC: 19 U/L (ref 15–37)
BACTERIA URNS QL MICRO: NEGATIVE /HPF
BASOPHILS # BLD: 0 K/UL (ref 0–0.1)
BASOPHILS NFR BLD: 0 % (ref 0–1)
BILIRUB SERPL-MCNC: 1 MG/DL (ref 0.2–1)
BILIRUB UR QL CFM: ABNORMAL
BUN SERPL-MCNC: 22 MG/DL (ref 6–20)
BUN/CREAT SERPL: 15 (ref 12–20)
CALCIUM SERPL-MCNC: 8.6 MG/DL (ref 8.5–10.1)
CHLORIDE SERPL-SCNC: 105 MMOL/L (ref 97–108)
CO2 SERPL-SCNC: 24 MMOL/L (ref 21–32)
COLOR UR: ABNORMAL
CREAT SERPL-MCNC: 1.42 MG/DL (ref 0.7–1.3)
DIFFERENTIAL METHOD BLD: ABNORMAL
EOSINOPHIL # BLD: 0.3 K/UL (ref 0–0.4)
EOSINOPHIL NFR BLD: 3 % (ref 0–7)
EPITH CASTS URNS QL MICRO: ABNORMAL /LPF
ERYTHROCYTE [DISTWIDTH] IN BLOOD BY AUTOMATED COUNT: 13.4 % (ref 11.5–14.5)
GLOBULIN SER CALC-MCNC: 3.1 G/DL (ref 2–4)
GLUCOSE SERPL-MCNC: 146 MG/DL (ref 65–100)
GLUCOSE UR STRIP.AUTO-MCNC: NEGATIVE MG/DL
HCT VFR BLD AUTO: 45.4 % (ref 36.6–50.3)
HGB BLD-MCNC: 15.3 G/DL (ref 12.1–17)
HGB UR QL STRIP: NEGATIVE
IMM GRANULOCYTES # BLD AUTO: 0.1 K/UL (ref 0–0.04)
IMM GRANULOCYTES NFR BLD AUTO: 1 % (ref 0–0.5)
KETONES UR QL STRIP.AUTO: 15 MG/DL
LACTATE BLD-SCNC: 0.79 MMOL/L (ref 0.4–2)
LEUKOCYTE ESTERASE UR QL STRIP.AUTO: ABNORMAL
LIPASE SERPL-CCNC: 191 U/L (ref 73–393)
LYMPHOCYTES # BLD: 0.9 K/UL (ref 0.8–3.5)
LYMPHOCYTES NFR BLD: 9 % (ref 12–49)
MCH RBC QN AUTO: 29.8 PG (ref 26–34)
MCHC RBC AUTO-ENTMCNC: 33.7 G/DL (ref 30–36.5)
MCV RBC AUTO: 88.5 FL (ref 80–99)
MONOCYTES # BLD: 0.9 K/UL (ref 0–1)
MONOCYTES NFR BLD: 10 % (ref 5–13)
NEUTS SEG # BLD: 7.6 K/UL (ref 1.8–8)
NEUTS SEG NFR BLD: 78 % (ref 32–75)
NITRITE UR QL STRIP.AUTO: POSITIVE
NRBC # BLD: 0 K/UL (ref 0–0.01)
NRBC BLD-RTO: 0 PER 100 WBC
PH UR STRIP: 5 [PH] (ref 5–8)
PLATELET # BLD AUTO: 126 K/UL (ref 150–400)
PMV BLD AUTO: 11.6 FL (ref 8.9–12.9)
POTASSIUM SERPL-SCNC: 4 MMOL/L (ref 3.5–5.1)
PROT SERPL-MCNC: 7.1 G/DL (ref 6.4–8.2)
PROT UR STRIP-MCNC: 30 MG/DL
RBC # BLD AUTO: 5.13 M/UL (ref 4.1–5.7)
RBC #/AREA URNS HPF: ABNORMAL /HPF (ref 0–5)
SODIUM SERPL-SCNC: 138 MMOL/L (ref 136–145)
SP GR UR REFRACTOMETRY: 1.03 (ref 1–1.03)
UR CULT HOLD, URHOLD: NORMAL
UROBILINOGEN UR QL STRIP.AUTO: 2 EU/DL (ref 0.2–1)
WBC # BLD AUTO: 9.8 K/UL (ref 4.1–11.1)
WBC URNS QL MICRO: ABNORMAL /HPF (ref 0–4)

## 2019-02-21 PROCEDURE — 83690 ASSAY OF LIPASE: CPT

## 2019-02-21 PROCEDURE — 83605 ASSAY OF LACTIC ACID: CPT

## 2019-02-21 PROCEDURE — 74011250636 HC RX REV CODE- 250/636: Performed by: EMERGENCY MEDICINE

## 2019-02-21 PROCEDURE — 74177 CT ABD & PELVIS W/CONTRAST: CPT

## 2019-02-21 PROCEDURE — 96374 THER/PROPH/DIAG INJ IV PUSH: CPT

## 2019-02-21 PROCEDURE — 81001 URINALYSIS AUTO W/SCOPE: CPT

## 2019-02-21 PROCEDURE — 96361 HYDRATE IV INFUSION ADD-ON: CPT

## 2019-02-21 PROCEDURE — 99284 EMERGENCY DEPT VISIT MOD MDM: CPT

## 2019-02-21 PROCEDURE — 36415 COLL VENOUS BLD VENIPUNCTURE: CPT

## 2019-02-21 PROCEDURE — 80053 COMPREHEN METABOLIC PANEL: CPT

## 2019-02-21 PROCEDURE — 96375 TX/PRO/DX INJ NEW DRUG ADDON: CPT

## 2019-02-21 PROCEDURE — 74011636320 HC RX REV CODE- 636/320: Performed by: EMERGENCY MEDICINE

## 2019-02-21 PROCEDURE — 74011250637 HC RX REV CODE- 250/637: Performed by: EMERGENCY MEDICINE

## 2019-02-21 PROCEDURE — 85025 COMPLETE CBC W/AUTO DIFF WBC: CPT

## 2019-02-21 RX ORDER — FENTANYL CITRATE 50 UG/ML
50 INJECTION, SOLUTION INTRAMUSCULAR; INTRAVENOUS
Status: COMPLETED | OUTPATIENT
Start: 2019-02-21 | End: 2019-02-21

## 2019-02-21 RX ORDER — ONDANSETRON 2 MG/ML
4 INJECTION INTRAMUSCULAR; INTRAVENOUS
Status: COMPLETED | OUTPATIENT
Start: 2019-02-21 | End: 2019-02-21

## 2019-02-21 RX ORDER — SODIUM CHLORIDE 0.9 % (FLUSH) 0.9 %
10 SYRINGE (ML) INJECTION
Status: COMPLETED | OUTPATIENT
Start: 2019-02-21 | End: 2019-02-21

## 2019-02-21 RX ORDER — CIPROFLOXACIN 500 MG/1
500 TABLET ORAL
Status: COMPLETED | OUTPATIENT
Start: 2019-02-21 | End: 2019-02-21

## 2019-02-21 RX ORDER — CIPROFLOXACIN 500 MG/1
500 TABLET ORAL 2 TIMES DAILY
Qty: 14 TAB | Refills: 0 | Status: SHIPPED | OUTPATIENT
Start: 2019-02-21 | End: 2019-02-28

## 2019-02-21 RX ORDER — TAMSULOSIN HYDROCHLORIDE 0.4 MG/1
0.4 CAPSULE ORAL DAILY
Qty: 7 CAP | Refills: 0 | Status: SHIPPED | OUTPATIENT
Start: 2019-02-21 | End: 2019-02-28

## 2019-02-21 RX ORDER — OXYCODONE HYDROCHLORIDE 5 MG/1
5 TABLET ORAL
Qty: 20 TAB | Refills: 0 | Status: SHIPPED | OUTPATIENT
Start: 2019-02-21 | End: 2019-05-06 | Stop reason: ALTCHOICE

## 2019-02-21 RX ORDER — ONDANSETRON 8 MG/1
8 TABLET, ORALLY DISINTEGRATING ORAL
Qty: 15 TAB | Refills: 0 | Status: SHIPPED | OUTPATIENT
Start: 2019-02-21 | End: 2019-05-06 | Stop reason: SINTOL

## 2019-02-21 RX ORDER — OXYCODONE HYDROCHLORIDE 5 MG/1
5 TABLET ORAL
Status: COMPLETED | OUTPATIENT
Start: 2019-02-21 | End: 2019-02-21

## 2019-02-21 RX ADMIN — Medication 10 ML: at 19:36

## 2019-02-21 RX ADMIN — FENTANYL CITRATE 50 MCG: 50 INJECTION, SOLUTION INTRAMUSCULAR; INTRAVENOUS at 18:28

## 2019-02-21 RX ADMIN — OXYCODONE HYDROCHLORIDE 5 MG: 5 TABLET ORAL at 22:05

## 2019-02-21 RX ADMIN — SODIUM CHLORIDE 1000 ML: 900 INJECTION, SOLUTION INTRAVENOUS at 18:13

## 2019-02-21 RX ADMIN — CIPROFLOXACIN 500 MG: 500 TABLET, FILM COATED ORAL at 22:05

## 2019-02-21 RX ADMIN — ONDANSETRON 4 MG: 2 INJECTION INTRAMUSCULAR; INTRAVENOUS at 18:28

## 2019-02-21 RX ADMIN — IOPAMIDOL 100 ML: 755 INJECTION, SOLUTION INTRAVENOUS at 19:36

## 2019-02-21 NOTE — ED PROVIDER NOTES
EMERGENCY DEPARTMENT HISTORY AND PHYSICAL EXAM 
 
 
Date: 2/21/2019 Patient Name: Chantelle Marti History of Presenting Illness Chief Complaint Patient presents with  Abdominal Pain Onset of pain to LLQ and left flank this morning with frequent urination. History Provided By: Patient HPI: Chantelle Marti, 59 y.o. male with PMHx significant for HTN, arrhythmia, Sjogren's, stroke, adenocarcinoma, presents ambulatory to the ED with cc of new onset moderate LLQ pain, ongoing since this morning. Pt reports nausea and dysuria alongside cc. 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 Pt notes to having similar sxs in September 2017 but states that he was not evaluated by a physician at that time. He notes pain exacerbation with palpation. Pt specifically denies any HA, SOB, CP, vomiting, fevers, chills, hematuria or diarrhea. There are no other complaints, changes, or physical findings at this time. PCP: Rivera Li, DO 
SHx: (-)smoker, (+)EtOH use: occasional, (-)illicit drug use No current facility-administered medications on file prior to encounter. Current Outpatient Medications on File Prior to Encounter Medication Sig Dispense Refill  sildenafil citrate (VIAGRA) 50 mg tablet Take 1 Tab by mouth as needed. 20 Tab 1  
 metFORMIN ER (GLUCOPHAGE XR) 500 mg tablet Take 1 Tab by mouth two (2) times a day. 60 Tab 11  
 ramipril (ALTACE) 10 mg capsule Take 1 Cap by mouth nightly. 90 Cap 3  
 gabapentin (NEURONTIN) 300 mg capsule TAKE 3 CAPS BY MOUTH TWO (2) TIMES A DAY. [address]  cyanocobalamin (VITAMIN B12) 1,000 mcg/mL injection INJECT CONTENTS OF ONE VIAL INTRAMUSCULARLY EVERY OTHER WEEK 6 mL 6  
 alpha-D-galactosidase (BEANO PO) Take 1 Tab by mouth two (2) times a day.  calcium carbonate (TUMS) 200 mg calcium (500 mg) chew Take 2 Tabs by mouth three (3) times daily as needed (indigestion).  pantoprazole (PROTONIX) 40 mg tablet TAKE 1 BY MOUTH EVERY MORNING FOR 30 DAYS  5  
 cholecalciferol, vitamin D3, (VITAMIN D3 PO) Take 1 Tab by mouth daily.  cetirizine (ZYRTEC) 10 mg tablet Take 10 mg by mouth nightly. Past History Past Medical History: 
Past Medical History:  
Diagnosis Date  Arrhythmia Previous a.fib, ablation, NSR now; NO LONGER FOLLOWED BY CARDIOLOGIST.  Autoimmune disease ([facility] [address] SJOGREN'S  
 Cancer ([facility] [address] COMMON BILE DUCT, ADENOCARCINOMA  Hypertension  Ill-defined condition   
 pre-diabetic - diet controlled  Sepsis ([facility] [address] 2017 STENTING TO BILE DUCT  Status post chemotherapy  Stroke [facility]) 2008  
 brain aneurysm - no deficits Past Surgical History: 
Past Surgical History:  
Procedure Laterality Date  ABDOMEN SURGERY PROC UNLISTED  10/2017 [place]  HX GI    
 COLONOSCOPY, POLYPS (BENIGN)  HX GI  10/06/2017 Viktor Cintron [address] [facility] [address]  2005 [address] Spinal fusion W/ HARDWARE  
 HX OTHER SURGICAL  11/07/2017 Blair Gonzalez, Dr. Anaya Right Nima Willis VASCULAR ACCESS  2017 PORTACATH - then removed  NEUROLOGICAL PROCEDURE UNLISTED  2005 [place] hole washout from cerebral hemorrhage Family History: 
Family History Problem Relation Age of Onset  Cancer Father Breast and Colon  Cancer Mother LEUKEMIA  
 No Known Problems Sister  No Known Problems Brother  No Known Problems Sister  Anesth Problems Neg Hx Social History: 
Social History Tobacco Use  Smoking status: Never Smoker  Smokeless tobacco: Never Used Substance Use Topics  Alcohol use: Yes Comment: very rare  Drug use: No  
 
 
Allergies: Allergies Allergen Reactions  Shellfish Derived Anaphylaxis Soft shell crabs  Pcn [Penicillins] Other (comments) Pt stated \"always been told PCN\" Review of Systems Review of Systems Constitutional: Negative. Negative for chills and fever. HENT: Negative. Negative for congestion, rhinorrhea and sinus pressure. Eyes: Negative. Negative for discharge and redness. Respiratory: Negative. Negative for chest tightness and shortness of breath. Cardiovascular: Negative. Negative for chest pain. Gastrointestinal: Positive for abdominal pain (LLQ) and nausea. Negative for blood in stool. Endocrine: Negative. Genitourinary: Positive for dysuria. Negative for flank pain and hematuria. Musculoskeletal: Negative. Negative for back pain. Skin: Negative. Negative for rash. Neurological: Negative. Negative for dizziness, seizures, weakness, numbness and headaches. Hematological: Negative. All other systems reviewed and are negative. Physical Exam  
Physical Exam  
Constitutional: He is oriented to person, place, and time. He appears well-developed and well-nourished. No distress. HENT:  
Head: Normocephalic and atraumatic. Nose: Nose normal.  
Mouth/Throat: No oropharyngeal exudate. Eyes: Conjunctivae and EOM are normal. Pupils are equal, round, and reactive to light. No scleral icterus. Neck: Normal range of motion. Neck supple. No JVD present. No thyromegaly present. Cardiovascular: Normal rate, regular rhythm, normal heart sounds, intact distal pulses and normal pulses. PMI is not displaced. Exam reveals no gallop and no friction rub. No murmur heard. Pulmonary/Chest: Effort normal and breath sounds normal. No stridor. No respiratory distress. He has no decreased breath sounds. He has no wheezes. He has no rhonchi. He has no rales. He exhibits no tenderness. Abdominal: Soft. Normal appearance, normal aorta and bowel sounds are normal. He exhibits no distension, no abdominal bruit and no mass. There is no hepatosplenomegaly. There is tenderness in the left lower quadrant. There is no rebound, no guarding and no CVA tenderness. No hernia.  Hernia confirmed negative in the ventral area and confirmed negative in the left inguinal area. Neurological: He is alert and oriented to person, place, and time. He displays no atrophy and no tremor. No cranial nerve deficit or sensory deficit. He exhibits normal muscle tone. He displays no seizure activity. Coordination normal. GCS eye subscore is 4. GCS verbal subscore is 5. GCS motor subscore is 6. Reflex Scores: 
     Patellar reflexes are 2+ on the right side and 2+ on the left side. Skin: Skin is warm. No rash noted. He is not diaphoretic. No erythema. Nursing note and vitals reviewed. Diagnostic Study Results Labs - Recent Results (from the past 12 hour(s)) URINALYSIS W/MICROSCOPIC Collection Time: 02/21/19  4:30 PM  
Result Value Ref Range Color ORANGE Appearance CLEAR CLEAR Specific gravity 1.029 1.003 - 1.030    
 pH (UA) 5.0 5.0 - 8.0 Protein 30 (A) NEG mg/dL Glucose NEGATIVE  NEG mg/dL Ketone 15 (A) NEG mg/dL Blood NEGATIVE  NEG Urobilinogen 2.0 (H) 0.2 - 1.0 EU/dL Nitrites POSITIVE (A) NEG Leukocyte Esterase MODERATE (A) NEG    
 WBC 0-4 0 - 4 /hpf  
 RBC 0-5 0 - 5 /hpf Epithelial cells FEW FEW /lpf Bacteria NEGATIVE  NEG /hpf URINE CULTURE HOLD SAMPLE Collection Time: 02/21/19  4:30 PM  
Result Value Ref Range Urine culture hold URINE ON HOLD IN MICROBIOLOGY DEPT FOR 3 DAYS. IF UNPRESERVED URINE IS SUBMITTED, IT CANNOT BE USED FOR ADDITIONAL TESTING AFTER 24 HRS, RECOLLECTION WILL BE REQUIRED. BILIRUBIN, CONFIRM Collection Time: 02/21/19  4:30 PM  
Result Value Ref Range Bilirubin UA, confirm INDETERMINATE DUE TO COLOR INTERFERENCE (A) NEG    
CBC WITH AUTOMATED DIFF Collection Time: 02/21/19  5:17 PM  
Result Value Ref Range WBC 9.8 4.1 - 11.1 K/uL  
 RBC 5.13 4.10 - 5.70 M/uL  
 HGB 15.3 12.1 - 17.0 g/dL HCT 45.4 36.6 - 50.3 %  MCV 88.5 80.0 - 99.0 FL  
 MCH 29.8 26.0 - 34.0 PG  
 MCHC 33.7 30.0 - 36.5 g/dL  
 RDW 13.4 11.5 - 14.5 % PLATELET 199 (L) 531 - 400 K/uL MPV 11.6 8.9 - 12.9 FL  
 NRBC 0.0 0  WBC ABSOLUTE NRBC 0.00 0.00 - 0.01 K/uL NEUTROPHILS 78 (H) 32 - 75 % LYMPHOCYTES 9 (L) 12 - 49 % MONOCYTES 10 5 - 13 % EOSINOPHILS 3 0 - 7 % BASOPHILS 0 0 - 1 % IMMATURE GRANULOCYTES 1 (H) 0.0 - 0.5 % ABS. NEUTROPHILS 7.6 1.8 - 8.0 K/UL  
 ABS. LYMPHOCYTES 0.9 0.8 - 3.5 K/UL  
 ABS. MONOCYTES 0.9 0.0 - 1.0 K/UL  
 ABS. EOSINOPHILS 0.3 0.0 - 0.4 K/UL  
 ABS. BASOPHILS 0.0 0.0 - 0.1 K/UL  
 ABS. IMM. GRANS. 0.1 (H) 0.00 - 0.04 K/UL  
 DF AUTOMATED METABOLIC PANEL, COMPREHENSIVE Collection Time: 02/21/19  5:17 PM  
Result Value Ref Range Sodium 138 136 - 145 mmol/L Potassium 4.0 3.5 - 5.1 mmol/L Chloride 105 97 - 108 mmol/L  
 CO2 24 21 - 32 mmol/L Anion gap 9 5 - 15 mmol/L Glucose 146 (H) 65 - 100 mg/dL BUN 22 (H) 6 - 20 MG/DL Creatinine 1.42 (H) 0.70 - 1.30 MG/DL  
 BUN/Creatinine ratio 15 12 - 20 GFR est AA >60 >60 ml/min/1.73m2 GFR est non-AA 51 (L) >60 ml/min/1.73m2 Calcium 8.6 8.5 - 10.1 MG/DL Bilirubin, total 1.0 0.2 - 1.0 MG/DL  
 ALT (SGPT) 38 12 - 78 U/L  
 AST (SGOT) 19 15 - 37 U/L Alk. phosphatase 136 (H) 45 - 117 U/L Protein, total 7.1 6.4 - 8.2 g/dL Albumin 4.0 3.5 - 5.0 g/dL Globulin 3.1 2.0 - 4.0 g/dL A-G Ratio 1.3 1.1 - 2.2 LIPASE Collection Time: 02/21/19  6:14 PM  
Result Value Ref Range Lipase 191 73 - 393 U/L  
POC LACTIC ACID Collection Time: 02/21/19  6:21 PM  
Result Value Ref Range Lactic Acid (POC) 0.79 0.40 - 2.00 mmol/L Radiologic Studies -  
CT ABD PELV W CONT Final Result IMPRESSION:  
  
1. Acute left hydronephrosis and diffuse hydroureter, resulting from a 2.5 mm  
left UVJ stone. 2. Stable postsurgical changes of Whipple procedure. No complicating features. CT Results  (Last 48 hours) 02/21/19 1936  CT ABD PELV W CONT Final result Impression:  IMPRESSION:  
   
1. Acute left hydronephrosis and diffuse hydroureter, resulting from a 2.5 mm  
left UVJ stone. 2. Stable postsurgical changes of Whipple procedure. No complicating features. Narrative:  EXAM: CT ABD PELV W CONT INDICATION: LLQ pain COMPARISON: CT 12/8/2018 CONTRAST: 100 mL of Isovue-370. TECHNIQUE:   
Following the uneventful intravenous administration of contrast, thin axial  
images were obtained through the abdomen and pelvis. Coronal and sagittal  
reconstructions were generated. Oral contrast was not administered. CT dose  
reduction was achieved through use of a standardized protocol tailored for this  
examination and automatic exposure control for dose modulation. FINDINGS:   
LUNG BASES: Clear. INCIDENTALLY IMAGED HEART AND MEDIASTINUM: There are coronary artery  
calcifications. LIVER: No mass or biliary dilatation. GALLBLADDER: Status post cholecystectomy SPLEEN: No mass. PANCREAS: Evidence of Whipple. No complicating features are noted. ADRENALS: Unremarkable. KIDNEYS: The right kidney is normal. There is a small left renal cyst. There is  
acute left hydronephrosis and diffuse hydroureter. STOMACH: Unremarkable. SMALL BOWEL: No dilatation or wall thickening. COLON: There is diverticulosis of the colon, without evidence of acute  
diverticulitis. APPENDIX: Unremarkable. PERITONEUM: No ascites or pneumoperitoneum. RETROPERITONEUM: No lymphadenopathy or aortic aneurysm. There is diffuse  
calcific aortic atherosclerosis. REPRODUCTIVE ORGANS: The prostate is enlarged. URINARY BLADDER: There is a 2.5 mm left UVJ stone causing acute left  
hydronephrosis and diffuse hydroureter. BONES: No destructive bone lesion. ADDITIONAL COMMENTS: There are postsurgical changes at L5-S1 consistent with  
fusion and decompression, appearing stable. CXR Results  (Last 48 hours) None Medical Decision Making I am the first provider for this patient. I reviewed the vital signs, available nursing notes, past medical history, past surgical history, family history and social history. Vital Signs-Reviewed the patient's vital signs. Patient Vitals for the past 12 hrs: 
 Temp Pulse Resp BP SpO2  
02/21/19 1915    126/86 94 % 02/21/19 1900    137/81 95 % 02/21/19 1702 97.6 °F (36.4 °C) 71 20 (!) 152/91 96 % 02/21/19 1553 98 °F (36.7 °C) 87 16 151/83 100 % Pulse Oximetry Analysis - 96% on RA Records Reviewed: Nursing Notes and Old Medical Records Provider Notes (Medical Decision Making):  
Diff Dx-renal colic,AAA,complicated UTI,diverticulitis,atypical appendicitis,hernia(incarcerated/strangulated),small bowel obstruction,pancreatitis,malignancy Imp/plan-Significant history of pancreatic CA with Whipple, to the ER with acute left flank and abdominal pain. Mild nausea. CT shoes ureteral stone but no secondary signs of infection. Patient tolerating liquids and pain controlled, will follow with Urology but encouraged to return with fever, vomiting or uncontrolled pain. ED Course:  
Initial assessment performed. The patients presenting problems have been discussed, and they are in agreement with the care plan formulated and outlined with them. I have encouraged them to ask questions as they arise throughout their visit. 9:48 PM  
Pt was re-evaluated and states that pain has improved. Results were discussed with pt and he notes that he would like to go home at this time. Critical Care Time: 0 Disposition: 
Discharge Note: 
9:53 PM 
The pt is ready for discharge. The pt's signs, symptoms, diagnosis, and discharge instructions have been discussed and pt has conveyed their understanding. The pt is to follow up as recommended or return to ER should their symptoms worsen. Plan has been discussed and pt is in agreement. PLAN: 1. Current Discharge Medication List  
  
START taking these medications Details  
ciprofloxacin HCl (CIPRO) 500 mg tablet Take 1 Tab by mouth two (2) times a day for 7 days. Qty: 14 Tab, Refills: 0 Associated Diagnoses: Kidney stone  
  
oxyCODONE IR (ROXICODONE) 5 mg immediate release tablet Take 1 Tab by mouth every six (6) hours as needed for Pain. Max Daily Amount: 20 mg. 
Qty: 20 Tab, Refills: 0 Associated Diagnoses: Kidney stone  
  
tamsulosin (FLOMAX) 0.4 mg capsule Take 1 Cap by mouth daily for 7 days. Qty: 7 Cap, Refills: 0 CONTINUE these medications which have CHANGED Details  
ondansetron (ZOFRAN ODT) 8 mg disintegrating tablet Take 1 Tab by mouth every eight (8) hours as needed for Nausea. Qty: 15 Tab, Refills: 0 Associated Diagnoses: Kidney stone 2. Follow-up Information Follow up With Specialties Details Why Contact Info [facility]  Schedule an appointment as soon as possible for a visit in 1 day  [address]   [phone] [facility] EMERGENCY DEPT Emergency Medicine  If symptoms worsen [address] 
[phone] Return to ED if worse Diagnosis Clinical Impression: 1. Kidney stone Attestations: This note is prepared by Matias Monteiro, acting as a Scribe for Iron Mendoza MD. Iron Mendoza MD: The scribe's documentation has been prepared under my direction and personally reviewed by me in its entirety. I confirm that the notes above accurately reflects all work, treatment, procedures, and medical decision making performed by me. This note will not be viewable in [address]

## 2019-02-21 NOTE — ED NOTES
Pt c/o LLQ pain since this AM - states hx of pancreatitis and pain is similar. Also states pain radiates into left flank which is also new and notes urinary frequency. Pt is A&O, VSS. Will continue to monitor.

## 2019-02-22 NOTE — ED NOTES
Bedside shift change report given to Karsten Velasquez RN  (oncoming nurse) by Virgie Posada RN (offgoing nurse). Report included the following information SBAR, ED Summary, MAR and Recent Results.

## 2019-02-22 NOTE — DISCHARGE INSTRUCTIONS
Patient Education        Kidney Stone: Care Instructions  Your Care Instructions    Kidney stones are formed when salts, minerals, and other substances normally found in the urine clump together. They can be as small as grains of sand or, rarely, as large as golf balls. While the stone is traveling through the ureter, which is the tube that carries urine from the kidney to the bladder, you will probably feel pain. The pain may be mild or very severe. You may also have some blood in your urine. As soon as the stone reaches the bladder, any intense pain should go away. If a stone is too large to pass on its own, you may need a medical procedure to help you pass the stone. The doctor has checked you carefully, but problems can develop later. If you notice any problems or new symptoms, get medical treatment right away. Follow-up care is a key part of your treatment and safety. Be sure to make and go to all appointments, and call your doctor if you are having problems. It's also a good idea to know your test results and keep a list of the medicines you take. How can you care for yourself at home? · Drink plenty of fluids, enough so that your urine is light yellow or clear like water. If you have kidney, heart, or liver disease and have to limit fluids, talk with your doctor before you increase the amount of fluids you drink. · Take pain medicines exactly as directed. Call your doctor if you think you are having a problem with your medicine. ? If the doctor gave you a prescription medicine for pain, take it as prescribed. ? If you are not taking a prescription pain medicine, ask your doctor if you can take an over-the-counter medicine. Read and follow all instructions on the label. · Your doctor may ask you to strain your urine so that you can collect your kidney stone when it passes. You can use a kitchen strainer or a tea strainer to catch the stone.  Store it in a plastic bag until you see your doctor again.  Preventing future kidney stones  Some changes in your diet may help prevent kidney stones. Depending on the cause of your stones, your doctor may recommend that you:  · Drink plenty of fluids, enough so that your urine is light yellow or clear like water. If you have kidney, heart, or liver disease and have to limit fluids, talk with your doctor before you increase the amount of fluids you drink. · Limit coffee, tea, and alcohol. Also avoid grapefruit juice. · Do not take more than the recommended daily dose of vitamins C and D.  · Avoid antacids such as Gaviscon, Maalox, Mylanta, or Tums. · Limit the amount of salt (sodium) in your diet. · Eat a balanced diet that is not too high in protein. · Limit foods that are high in a substance called oxalate, which can cause kidney stones. These foods include dark green vegetables, rhubarb, chocolate, wheat bran, nuts, cranberries, and beans. When should you call for help? Call your doctor now or seek immediate medical care if:    · You cannot keep down fluids.     · Your pain gets worse.     · You have a fever or chills.     · You have new or worse pain in your back just below your rib cage (the flank area).     · You have new or more blood in your urine.    Watch closely for changes in your health, and be sure to contact your doctor if:    · You do not get better as expected. Where can you learn more? Go to http://sandra-jenelle.info/. Enter W225 in the search box to learn more about \"Kidney Stone: Care Instructions. \"  Current as of: March 14, 2018  Content Version: 11.9  © 8470-3012 [employer]. Care instructions adapted under license by [employer] (which disclaims liability or warranty for this information).  If you have questions about a medical condition or this instruction, always ask your healthcare professional. [address] any warranty or liability for your use of this information. [address] [employer] Activation    Thank you for requesting access to [facility]. Please follow the instructions below to securely access and download your online medical record. [facility] allows you to send messages to your doctor, view your test results, renew your prescriptions, schedule appointments, and more. How Do I Sign Up? 1. In your internet browser, go to [email or web address]  2. Click on the First Time User? Click Here link in the Sign In box. You will be redirect to the New Member Sign Up page. 3. Enter your [facility] Access Code exactly as it appears below. You will not need to use this code after youve completed the sign-up process. If you do not sign up before the expiration date, you must request a new code. [facility] Access Code: Activation code not generated  Current [facility] Status: Active (This is the date your [facility] access code will )    4. Enter the last four digits of your Social Security Number (xxxx) and Date of Birth (mm/dd/yyyy) as indicated and click Submit. You will be taken to the next sign-up page. 5. Create a [facility] ID. This will be your [facility] login ID and cannot be changed, so think of one that is secure and easy to remember. 6. Create a [facility] password. You can change your password at any time. 7. Enter your Password Reset Question and Answer. This can be used at a later time if you forget your password. 8. Enter your e-mail address. You will receive e-mail notification when new information is available in [address] 9. Click Sign Up. You can now view and download portions of your medical record. 10. Click the Download Summary menu link to download a portable copy of your medical information. Additional Information    If you have questions, please visit the Frequently Asked Questions section of the [employer] website at [email or web address] Remember, [facility] is NOT to be used for urgent needs. For medical emergencies, dial 911.

## 2019-03-18 ENCOUNTER — OFFICE VISIT (OUTPATIENT)
Dept: ONCOLOGY | Age: 64
End: 2019-03-18

## 2019-03-18 VITALS
HEIGHT: 68 IN | DIASTOLIC BLOOD PRESSURE: 83 MMHG | OXYGEN SATURATION: 97 % | SYSTOLIC BLOOD PRESSURE: 129 MMHG | WEIGHT: 188.2 LBS | BODY MASS INDEX: 28.52 KG/M2 | TEMPERATURE: 98.2 F | HEART RATE: 77 BPM | RESPIRATION RATE: 18 BRPM

## 2019-03-18 DIAGNOSIS — C22.1 CHOLANGIOCARCINOMA OF BILIARY TRACT (HCC): Primary | ICD-10-CM

## 2019-03-18 RX ORDER — TAMSULOSIN HYDROCHLORIDE 0.4 MG/1
CAPSULE ORAL
Refills: 0 | COMMUNITY
Start: 2019-02-28 | End: 2020-10-20 | Stop reason: SDUPTHER

## 2019-03-18 RX ORDER — CHOLESTYRAMINE 4 G/9G
POWDER, FOR SUSPENSION ORAL
Refills: 99 | COMMUNITY
Start: 2019-02-13 | End: 2019-05-06

## 2019-03-18 NOTE — PROGRESS NOTES
[address] [facility]  at [address]  [place], [address] [facility]  [phone]       Follow-up Note      Patient: Shaun Sow MRN: [id number]  SSN: [id number]    YOB: 1955  Age: 59 y.o. Sex: male      Diagnosis:     1. 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 S/P Pancreatoduodenectomy on  10/06/2017  2. Adjuvant monotherapy with Capecitabine - s/p 6 cycles   (12/4/2017 - 05/2018)    Subjective:      Shaun Sow is a 59 y.o. male with a diagnosis of extrahepatic cholangiocarcinoma. He presented to the ED in August 2017 with obstructive jaundice. He was found to have an obstructive lesion in the dital bile duct. The CT showed marked intrahepatic biliary dilation and common bile duct dilation to the level of the mid common bile duct, which ws markedly narrowed. He underwent a stenting procedure followed by spyglass cholangiogram. The brushings revealed a diagnosis of cholangiocarcinoma. He underwent a Whipple procedure on 10/06/2017. He completed 6 cycles of adjuvant Xeloda. He recently went to the ED for abdominal pain and was found to have a kidney stone. A repeat scan was obtained in the ED and showed no recurrent or metastatic disease. Review of Systems:    Constitutional: negative  Eyes: negative  Ears, Nose, Mouth, Throat, and Face: negative  Respiratory: negative  Cardiovascular: negative  Gastrointestinal: negative  Genitourinary:negative  Integument/Breast: negative  Hematologic/Lymphatic: negative  Musculoskeletal:negative  Neurological: negative        Past Medical History:   Diagnosis Date    Arrhythmia     Previous a.fib, ablation, NSR now; NO LONGER FOLLOWED BY CARDIOLOGIST.     Autoimmune disease ([address]     SJOGREN'S    Cancer ([address]     COMMON BILE DUCT, ADENOCARCINOMA    Hypertension     Ill-defined condition pre-diabetic - diet controlled    Sepsis ([facility] 2017    STENTING TO BILE DUCT    Status post chemotherapy     Stroke [facility]) 2008    brain aneurysm - no deficits     Past Surgical History:   Procedure Laterality Date    ABDOMEN SURGERY PROC UNLISTED  10/2017    Whipple     HX GI      COLONOSCOPY, POLYPS (BENIGN)    HX GI  10/06/2017    Viktor Galvez [facility]     [address]  2005    Ablation     HX ORTHOPAEDIC  2000    Spinal fusion W/ HARDWARE    HX OTHER SURGICAL  11/07/2017    [place] Cath, Dr. Sandoval Drivers  2017    PORTACATH - then removed    NEUROLOGICAL PROCEDURE UNLISTED  2005    Onnie Spindle hole washout from cerebral hemorrhage      Family History   Problem Relation Age of Onset    Cancer Father         Breast and Colon    Cancer Mother         LEUKEMIA    No Known Problems Sister     No Known Problems Brother     No Known Problems Sister     Anesth Problems Neg Hx      Social History     Tobacco Use    Smoking status: Never Smoker    Smokeless tobacco: Never Used   Substance Use Topics    Alcohol use: Yes     Comment: very rare      Prior to Admission medications    Medication Sig Start Date End Date Taking? Authorizing Provider   cholestyramine-sucrose 4 gram powder TAKE 1/2 SCOOP DISSOLVED IN WATER TWICE A DAY WITH MEALS FOR 30 DAYS 2/13/19  Yes Provider, Historical   tamsulosin (FLOMAX) 0.4 mg capsule TAKE ONE CAPSULE BY MOUTH EVERY EVENING 2/28/19  Yes Provider, Historical   ondansetron (ZOFRAN ODT) 8 mg disintegrating tablet Take 1 Tab by mouth every eight (8) hours as needed for Nausea. 2/21/19  Yes Lenka Elias MD   sildenafil citrate (VIAGRA) 50 mg tablet Take 1 Tab by mouth as needed. 1/14/19  Yes Silas Raya III, DO   metFORMIN ER (GLUCOPHAGE XR) 500 mg tablet Take 1 Tab by mouth two (2) times a day. 1/14/19  Yes Silas Raya III, DO   ramipril (ALTACE) 10 mg capsule Take 1 Cap by mouth nightly.  1/4/19  Yes Dulce Adam III, DO   gabapentin (NEURONTIN) 300 mg capsule TAKE 3 CAPS BY MOUTH TWO (2) TIMES A DAY. 11/18/18  Yes Silas Raya III, DO   cyanocobalamin (VITAMIN B12) 1,000 mcg/mL injection INJECT CONTENTS OF ONE VIAL INTRAMUSCULARLY EVERY OTHER WEEK 9/20/18  Yes Silas Raya III, DO   alpha-D-galactosidase (BEANO PO) Take 1 Tab by mouth two (2) times a day. Yes Other, MD Flynn   calcium carbonate (TUMS) 200 mg calcium (500 mg) chew Take 2 Tabs by mouth three (3) times daily as needed (indigestion). Yes Other, MD Flynn   pantoprazole (PROTONIX) 40 mg tablet TAKE 1 BY MOUTH EVERY MORNING FOR 30 DAYS 7/22/18  Yes Provider, Historical   cholecalciferol, vitamin D3, (VITAMIN D3 PO) Take 1 Tab by mouth daily. Yes Provider, Historical   cetirizine (ZYRTEC) 10 mg tablet Take 10 mg by mouth nightly. Yes Provider, Historical   oxyCODONE IR (ROXICODONE) 5 mg immediate release tablet Take 1 Tab by mouth every six (6) hours as needed for Pain. Max Daily Amount: 20 mg. 2/21/19   Alejandra Solares MD          Allergies   Allergen Reactions    Shellfish Derived Anaphylaxis     Soft shell crabs    Pcn [Penicillins] Other (comments)     Pt stated \"always been told PCN\"           Objective:     Vitals:    03/18/19 0938   BP: 129/83   Pulse: 77   Resp: 18   Temp: 98.2 °F (36.8 °C)   TempSrc: Oral   SpO2: 97%   Weight: 188 lb 3.2 oz (85.4 kg)   Height: 5' 8\" (1.727 m)        Pain Scale: 0 - No pain/10      Physical Exam:    GENERAL: alert, cooperative, no distress, appears stated age  EYE: negative  LYMPHATIC: Cervical, supraclavicular, and axillary nodes normal.   THROAT & NECK: normal and no erythema or exudates noted.    LUNG: clear to auscultation bilaterally  HEART: regular rate and rhythm  ABDOMEN: soft, non-tender  EXTREMITIES:  no edema  SKIN: Normal.  NEUROLOGIC: negative        CT Results (most recent):  Results from Hospital Encounter encounter on 02/21/19   CT ABD PELV W CONT    Narrative EXAM: CT ABD PELV W CONT    INDICATION: LLQ pain    COMPARISON: CT 12/8/2018     CONTRAST: 100 mL of Isovue-370. TECHNIQUE:   Following the uneventful intravenous administration of contrast, thin axial  images were obtained through the abdomen and pelvis. Coronal and sagittal  reconstructions were generated. Oral contrast was not administered. CT dose  reduction was achieved through use of a standardized protocol tailored for this  examination and automatic exposure control for dose modulation. FINDINGS:   LUNG BASES: Clear. INCIDENTALLY IMAGED HEART AND MEDIASTINUM: There are coronary artery  calcifications. LIVER: No mass or biliary dilatation. GALLBLADDER: Status post cholecystectomy  SPLEEN: No mass. PANCREAS: Evidence of Whipple. No complicating features are noted. ADRENALS: Unremarkable. KIDNEYS: The right kidney is normal. There is a small left renal cyst. There is  acute left hydronephrosis and diffuse hydroureter. STOMACH: Unremarkable. SMALL BOWEL: No dilatation or wall thickening. COLON: There is diverticulosis of the colon, without evidence of acute  diverticulitis. APPENDIX: Unremarkable. PERITONEUM: No ascites or pneumoperitoneum. RETROPERITONEUM: No lymphadenopathy or aortic aneurysm. There is diffuse  calcific aortic atherosclerosis. REPRODUCTIVE ORGANS: The prostate is enlarged. URINARY BLADDER: There is a 2.5 mm left UVJ stone causing acute left  hydronephrosis and diffuse hydroureter. BONES: No destructive bone lesion. ADDITIONAL COMMENTS: There are postsurgical changes at L5-S1 consistent with  fusion and decompression, appearing stable. Impression IMPRESSION:    1. Acute left hydronephrosis and diffuse hydroureter, resulting from a 2.5 mm  left UVJ stone. 2. Stable postsurgical changes of Whipple procedure. No complicating features. I personally reviewed the images. No evidence of metastatic disease. Assessment:     1.  Extrahepatic cholangiocarcinoma:    T3 N1 (1 of 12 LN +ve)  Invasion into pancreas  R0 resection    > S/P Pancreatoduodenectomy on  10/06/2017    Completed adjuvant treatment with single agent Capecitabine - s/p 6 cycles (12/4/2017 - 05/2018). Asymptomatic  In remission  Continue surveillance      Plan:       > Labs CBC, CMP and CA 19-9  > CT Abd Pelv in 6 months  > Follow-up in 6 months        Signed by: Stephani Yanez MD                     March 18, 2019          CC. Rudy Woodard MD  CC. Nikita Hernández MD  CC. Nurys Hayes MD  CC.  Queta Cole MD

## 2019-03-18 NOTE — PROGRESS NOTES
Vero Du is a 65 y.o. male here today for Extrahepatic Cholangio f/u. VS stable. Patient denies pain. Good appetite. Patient denies diarrhea and constipation. Patient reports N/V at times. Patient denies numbness and tingling. Patient denies mouth ulcers. Patient denies cough. Patient denies SOB. Patient reports fatigue. Visit Vitals  /83 (BP 1 Location: Left arm, BP Patient Position: Sitting)   Pulse 77   Temp 98.2 °F (36.8 °C) (Oral)   Resp 18   Ht 5' 8\" (1.727 m)   Wt 188 lb 3.2 oz (85.4 kg)   SpO2 97%   BMI 28.62 kg/m²       Pain Scale: 0 - No pain/10  Pain Location:      1. Have you been to the ER, urgent care clinic since your last visit? Hospitalized since your last visit? Yes    2. Have you seen or consulted any other health care providers outside of the [address] [place] [address] since your last visit? Include any pap smears or colon screening. No     Health Maintenance Review: Patient reminded of \"due or due soon\" health maintenance. I have asked the patient to contact his/her primary care provider (PCP) for follow-up on his/her health maintenance.

## 2019-03-27 DIAGNOSIS — R11.2 NAUSEA AND VOMITING, INTRACTABILITY OF VOMITING NOT SPECIFIED, UNSPECIFIED VOMITING TYPE: ICD-10-CM

## 2019-03-27 RX ORDER — ONDANSETRON 8 MG/1
TABLET, ORALLY DISINTEGRATING ORAL
Qty: 40 TAB | Refills: 0 | Status: SHIPPED | OUTPATIENT
Start: 2019-03-27 | End: 2019-05-06 | Stop reason: SDUPTHER

## 2019-05-06 ENCOUNTER — OFFICE VISIT (OUTPATIENT)
Dept: INTERNAL MEDICINE CLINIC | Age: 64
End: 2019-05-06

## 2019-05-06 VITALS
TEMPERATURE: 98.2 F | RESPIRATION RATE: 16 BRPM | HEART RATE: 82 BPM | SYSTOLIC BLOOD PRESSURE: 106 MMHG | OXYGEN SATURATION: 96 % | HEIGHT: 68 IN | BODY MASS INDEX: 28.34 KG/M2 | DIASTOLIC BLOOD PRESSURE: 67 MMHG | WEIGHT: 187 LBS

## 2019-05-06 DIAGNOSIS — E11.9 CONTROLLED TYPE 2 DIABETES MELLITUS WITHOUT COMPLICATION, WITHOUT LONG-TERM CURRENT USE OF INSULIN (HCC): Primary | Chronic | ICD-10-CM

## 2019-05-06 DIAGNOSIS — R11.2 NON-INTRACTABLE VOMITING WITH NAUSEA, UNSPECIFIED VOMITING TYPE: ICD-10-CM

## 2019-05-06 DIAGNOSIS — C24.9 CHOLANGIOCARCINOMA DETERMINED BY BIOPSY OF BILIARY TRACT (HCC): ICD-10-CM

## 2019-05-06 DIAGNOSIS — I10 ESSENTIAL HYPERTENSION: ICD-10-CM

## 2019-05-06 DIAGNOSIS — I48.0 PAROXYSMAL ATRIAL FIBRILLATION (HCC): ICD-10-CM

## 2019-05-06 LAB — HBA1C MFR BLD HPLC: 7 %

## 2019-05-06 RX ORDER — FAMOTIDINE 20 MG/1
20 TABLET, FILM COATED ORAL DAILY
COMMUNITY
Start: 2011-02-22 | End: 2019-12-02

## 2019-05-06 RX ORDER — SILDENAFIL 50 MG/1
50 TABLET, FILM COATED ORAL AS NEEDED
Qty: 30 TAB | Refills: 1 | Status: SHIPPED | OUTPATIENT
Start: 2019-05-06 | End: 2021-01-01 | Stop reason: ALTCHOICE

## 2019-05-06 RX ORDER — PROMETHAZINE HYDROCHLORIDE 25 MG/1
25 TABLET ORAL
Qty: 30 TAB | Refills: 1 | Status: SHIPPED | OUTPATIENT
Start: 2019-05-06 | End: 2019-11-20

## 2019-05-06 RX ORDER — ASPIRIN 81 MG/1
81 TABLET ORAL DAILY
COMMUNITY
End: 2019-12-04

## 2019-05-06 NOTE — PROGRESS NOTES
Bairon Daniel is a 59 y.o. male who presents for evaluation of routine follow up. Last seen by me jan 14, 2019. Since then had 2.5 mm kidney stone that he went to ED for on feb 21. Was started on flomax then, which has also helped his nocturia. Overall feeling well, though has been having some n/v over past few days. Last time he had any n/v was about 6 weeks ago. ROS: 
Constitutional: negative for fevers, chills, anorexia and weight loss Eyes:   negative for visual disturbance and irritation ENT:   negative for tinnitus,sore throat,nasal congestion,ear pain,hoarseness Respiratory:  negative for cough, hemoptysis, dyspnea,wheezing CV:   negative for chest pain, palpitations, lower extremity edema GI:   negative for diarrhea, abdominal pain,melena.  ++n/v 
Genitourinary: negative for frequency, dysuria and hematuria Musculoskel: negative for myalgias, arthralgias, back pain, muscle weakness, joint pain Neurological:  negative for headaches, dizziness, focal weakness, numbness Psychiatric:     Negative for depression or anxiety Past Medical History:  
Diagnosis Date  Arrhythmia Previous a.fib, ablation, NSR now; NO LONGER FOLLOWED BY CARDIOLOGIST.  Autoimmune disease ([address] SJOGREN'S  
 Cancer ([address] COMMON BILE DUCT, ADENOCARCINOMA  Hypertension  Ill-defined condition   
 pre-diabetic - diet controlled  Sepsis ([address] 2017 STENTING TO BILE DUCT  Status post chemotherapy  Stroke [facility]) 2008  
 brain aneurysm - no deficits Past Surgical History:  
Procedure Laterality Date  ABDOMEN SURGERY PROC UNLISTED  10/2017 [place]olvin  HX GI    
 COLONOSCOPY, POLYPS (BENIGN)  HX GI  10/06/2017 Viktor Nix [address] [facility] [address]  2005 [address] Spinal fusion W/ HARDWARE  
 HX OTHER SURGICAL  11/07/2017 [place], Dr. Hailey Boland VASCULAR ACCESS  2017 PORTACATH - then removed  NEUROLOGICAL PROCEDURE UNLISTED  2005 [place] hole washout from cerebral hemorrhage Family History Problem Relation Age of Onset  Cancer Father Breast and Colon  Cancer Mother LEUKEMIA  
 No Known Problems Sister  No Known Problems Brother  No Known Problems Sister  Anesth Problems Neg Hx Social History Socioeconomic History  Marital status:  Spouse name: Not on file  Number of children: Not on file  Years of education: Not on file  Highest education level: Not on file Occupational History  Not on file Social Needs  Financial resource strain: Not on file  Food insecurity:  
  Worry: Not on file Inability: Not on file  Transportation needs:  
  Medical: Not on file Non-medical: Not on file Tobacco Use  Smoking status: Never Smoker  Smokeless tobacco: Never Used Substance and Sexual Activity  Alcohol use: Yes Comment: very rare  Drug use: No  
 Sexual activity: Yes  
  Partners: Female Lifestyle  Physical activity:  
  Days per week: Not on file Minutes per session: Not on file  Stress: Not on file Relationships  Social connections:  
  Talks on phone: Not on file Gets together: Not on file Attends Alevism service: Not on file Active member of club or organization: Not on file Attends meetings of clubs or organizations: Not on file Relationship status: Not on file  Intimate partner violence:  
  Fear of current or ex partner: Not on file Emotionally abused: Not on file Physically abused: Not on file Forced sexual activity: Not on file Other Topics Concern  Not on file Social History Narrative  Not on file Visit Vitals /67 (BP 1 Location: Right arm, BP Patient Position: Sitting) Pulse 82 Temp 98.2 °F (36.8 °C) (Oral) Resp 16 Ht 5' 8\" (1.727 m) Wt 187 lb (84.8 kg) SpO2 96% BMI 28.43 kg/m² Physical Examination:  
General - Well appearing male HEENT - PERRL, TM no erythema/opacification, normal nasal turbinates, no oropharyngeal erythema or exudate, MMM Neck - supple, no bruits, no thyroidomegaly, no lymphadenopathy Pulm - clear to auscultation bilaterally Cardio - RRR, normal S1 S2, no murmur Abd - soft, nontender, no masses, no HSM. Benign exam 
Extrem - no edema, +2 distal pulses Neuro-  No focal deficits, CN intact Assessment/Plan: 
 
1. N/v--rx for phenergan. zofran does not help. Has been told likely due to scar tissue from his cholangiocarcinoma and whipple procedure. 2.  Dm, type 2--on glucophage. a1c today 7.0. Had been 6.6 3. Left sided nephrolithiasis with mild hydronephrosis--resolved, on flomax now 4.  bph with nocturia--much improved since starting flomax 5. pafib--nsr. Not on any rate meds. On asa 6.  htn--controlled with altace 7. Cholangiocarcinoma--sp whipple 
 
rtc 6 months Duy Whalen III, DO 
 
 
 
 
 
 Yes

## 2019-05-06 NOTE — PROGRESS NOTES
Chief Complaint Patient presents with  Follow-up 3 month 1. Have you been to the ER, urgent care clinic since your last visit? Hospitalized since your last visit? Yes, for kidney stones 2. Have you seen or consulted any other health care providers outside of the [address] [place] [address] since your last visit? Include any pap smears or colon screening.  No

## 2019-05-06 NOTE — PATIENT INSTRUCTIONS
Nausea and Vomiting: Care Instructions Your Care Instructions When you are nauseated, you may feel weak and sweaty and notice a lot of saliva in your mouth. Nausea often leads to vomiting. Most of the time you do not need to worry about nausea and vomiting, but they can be signs of other illnesses. Two common causes of nausea and vomiting are stomach flu and food poisoning. Nausea and vomiting from viral stomach flu will usually start to improve within 24 hours. Nausea and vomiting from food poisoning may last from 12 to 48 hours. The doctor has checked you carefully, but problems can develop later. If you notice any problems or new symptoms, get medical treatment right away. Follow-up care is a key part of your treatment and safety. Be sure to make and go to all appointments, and call your doctor if you are having problems. It's also a good idea to know your test results and keep a list of the medicines you take. How can you care for yourself at home? · To prevent dehydration, drink plenty of fluids, enough so that your urine is light yellow or clear like water. Choose water and other caffeine-free clear liquids until you feel better. If you have kidney, heart, or liver disease and have to limit fluids, talk with your doctor before you increase the amount of fluids you drink. · Rest in bed until you feel better. · When you are able to eat, try clear soups, mild foods, and liquids until all symptoms are gone for 12 to 48 hours. Other good choices include dry toast, crackers, cooked cereal, and gelatin dessert, such as Jell-O. When should you call for help? Call 911 anytime you think you may need emergency care. For example, call if: 
  · You passed out (lost consciousness).  
 Call your doctor now or seek immediate medical care if: 
  · You have symptoms of dehydration, such as: 
? Dry eyes and a dry mouth. ? Passing only a little dark urine. ?  Feeling thirstier than usual.  
   · You have new or worsening belly pain.  
  · You have a new or higher fever.  
  · You vomit blood or what looks like coffee grounds.  
 Watch closely for changes in your health, and be sure to contact your doctor if: 
  · You have ongoing nausea and vomiting.  
  · Your vomiting is getting worse.  
  · Your vomiting lasts longer than 2 days.  
  · You are not getting better as expected. Where can you learn more? Go to http://sandra-jenelle.info/. Enter 25 [id number] in the search box to learn more about \"Nausea and Vomiting: Care Instructions. \" Current as of: September 23, 2018 Content Version: 11.9 © 5051-6190 [employer]. Care instructions adapted under license by [employer] (which disclaims liability or warranty for this information). If you have questions about a medical condition or this instruction, always ask your healthcare professional. [address] any warranty or liability for your use of this information.

## 2019-05-30 ENCOUNTER — OFFICE VISIT (OUTPATIENT)
Dept: ENDOCRINOLOGY | Age: 64
End: 2019-05-30

## 2019-05-30 VITALS
HEIGHT: 68 IN | HEART RATE: 73 BPM | WEIGHT: 187 LBS | SYSTOLIC BLOOD PRESSURE: 114 MMHG | BODY MASS INDEX: 28.34 KG/M2 | DIASTOLIC BLOOD PRESSURE: 69 MMHG

## 2019-05-30 DIAGNOSIS — E11.9 TYPE 2 DIABETES MELLITUS WITHOUT COMPLICATION, WITHOUT LONG-TERM CURRENT USE OF INSULIN (HCC): Primary | ICD-10-CM

## 2019-05-30 DIAGNOSIS — E11.9 TYPE 2 DIABETES MELLITUS WITHOUT COMPLICATION, WITHOUT LONG-TERM CURRENT USE OF INSULIN (HCC): ICD-10-CM

## 2019-05-30 DIAGNOSIS — I10 ESSENTIAL HYPERTENSION WITH GOAL BLOOD PRESSURE LESS THAN 130/80: ICD-10-CM

## 2019-05-30 RX ORDER — INSULIN PUMP SYRINGE, 3 ML
EACH MISCELLANEOUS
Qty: 1 KIT | Refills: 0 | Status: SHIPPED | OUTPATIENT
Start: 2019-05-30 | End: 2019-12-04 | Stop reason: CLARIF

## 2019-05-30 RX ORDER — LANCETS
EACH MISCELLANEOUS
Qty: 100 EACH | Refills: 3 | Status: SHIPPED | OUTPATIENT
Start: 2019-05-30 | End: 2019-12-04 | Stop reason: CLARIF

## 2019-05-30 NOTE — PATIENT INSTRUCTIONS
Start Jardiance 25mg each morning  Continue metformin 500mg once daily    Check glucose each morning, and occasionally at bedtime (2 hrs at bedtime)     Remember to complete fasting blood work at the lab,       See you back in 3 months,      Laurie LOPEZ.  [address] [facility] Endocrinology  [address] [place] [address]

## 2019-05-30 NOTE — PROGRESS NOTES
CONSULTATION REQUESTED BY: Ester Coppola DO     REASON FOR CONSULT:  Uncontrolled type 2 diabetes    CHIEF COMPLAINT: evaluation of type 2 diabetes    HISTORY OF PRESENT ILLNESS:   Evelin Bernal is a 59 y.o. male with a PMHx as noted below who presents for evaluation of uncontrolled type 2 diabetes. 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,  Family History of diabetes is positive in his father,  Last A1c prior to initial visit was 7.0%,    Current Home Regimen:  - metformin 500mg once daily    Review of home glucose:  No log or meter  AM: 180-220 mostly    Review of most recent diabetes-related labs:  Lab Results   Component Value Date    HBA1C 6.6 (H) 10/30/2018    HBA1C 6.5 (H) 06/07/2018    HBA1C 6.0 (H) 10/26/2017    WIG2KYKJ 7.0 05/06/2019    BPV1MMXC 7.6 08/31/2018    CHOL 76 (L) 10/30/2018    LDLC 41 10/30/2018    GFRAA >60 02/21/2019    GFRNA 51 (L) 02/21/2019    MCACR 3.1 10/30/2018    VITD3 49.3 10/26/2017    B12LT 928 10/26/2017     Lab Key:  [id number] = IA-2 pancreatic islet cell autoantibody  CPEPL = C-peptide level  :EXT = External Lab  GADLT = SANAM-65 autoantibody   INSUL = Insulin level  MCACR (or MALBEXT) = Urine Microalbumin (or External UM)  B12LT = B12 level    PAST MEDICAL/SURGICAL HISTORY:   Past Medical History:   Diagnosis Date    Arrhythmia     Previous a.fib, ablation, NSR now; NO LONGER FOLLOWED BY CARDIOLOGIST.     Autoimmune disease ([address]     SJOGREN'S    Cancer ([address]     COMMON BILE DUCT, ADENOCARCINOMA    Hypertension     Ill-defined condition     pre-diabetic - diet controlled    Sepsis ([address] 2017    STENTING TO BILE DUCT    Status post chemotherapy     Stroke [facility]) 2008    brain aneurysm - no deficits     Past Surgical History:   Procedure Laterality Date    ABDOMEN SURGERY PROC UNLISTED  10/2017    Whipple     HX GI      COLONOSCOPY, POLYPS (BENIGN)    HX GI 10/06/2017    [facility]     [address]  2005    Ablation     HX ORTHOPAEDIC  2000    Spinal fusion W/ HARDWARE    [facility] OTHER [place]  [place] [place]    PORTACATH - then removed    NEUROLOGICAL PROCEDURE UNLISTED  2005    Ting hole washout from cerebral hemorrhage       ALLERGIES:   Allergies   Allergen Reactions    Shellfish Derived Anaphylaxis     Soft shell crabs    Pcn [Penicillins] Other (comments)     Pt stated \"always been told PCN\"       MEDICATIONS ON ADMISSION:     Current Outpatient Medications:     gabapentin (NEURONTIN) 300 mg capsule, TAKE 3 CAPS BY MOUTH TWO (2) TIMES A DAY., Disp: 540 Cap, Rfl: 3    tamsulosin (FLOMAX) 0.4 mg capsule, TAKE ONE CAPSULE BY MOUTH EVERY EVENING, Disp: , Rfl: 0    metFORMIN ER (GLUCOPHAGE XR) 500 mg tablet, Take 1 Tab by mouth two (2) times a day. (Patient taking differently: Take 500 mg by mouth daily.), Disp: 60 Tab, Rfl: 11    ramipril (ALTACE) 10 mg capsule, Take 1 Cap by mouth nightly., Disp: 90 Cap, Rfl: 3    cyanocobalamin (VITAMIN B12) 1,000 mcg/mL injection, INJECT CONTENTS OF ONE VIAL INTRAMUSCULARLY EVERY OTHER WEEK, Disp: 6 mL, Rfl: 6    alpha-D-galactosidase (BEANO PO), Take 1 Tab by mouth two (2) times a day., Disp: , Rfl:     cholecalciferol, vitamin D3, (VITAMIN D3 PO), Take 1 Tab by mouth daily. , Disp: , Rfl:     cetirizine (ZYRTEC) 10 mg tablet, Take 10 mg by mouth nightly., Disp: , Rfl:     aspirin delayed-release 81 mg tablet, Take 81 mg by mouth., Disp: , Rfl:     famotidine (PEPCID) 20 mg tablet, Take 20 mg by mouth., Disp: , Rfl:     sildenafil citrate (VIAGRA) 50 mg tablet, Take 1 Tab by mouth as needed (ED)., Disp: 30 Tab, Rfl: 1    promethazine (PHENERGAN) 25 mg tablet, Take 1 Tab by mouth every six (6) hours as needed for Nausea., Disp: 30 Tab, Rfl: 1    calcium carbonate (TUMS) 200 mg calcium (500 mg) chew, Take 2 Tabs by mouth three (3) times daily as needed (indigestion). , Disp: , Rfl:     pantoprazole (PROTONIX) 40 mg tablet, TAKE 1 BY MOUTH EVERY MORNING FOR 30 DAYS, Disp: , Rfl: 5    SOCIAL HISTORY:   Social History     Socioeconomic History    Marital status:      Spouse name: Not on file    Number of children: Not on file    Years of education: Not on file    Highest education level: Not on file   Occupational History    Not on file   Social Needs    Financial resource strain: Not on file    Food insecurity:     Worry: Not on file     Inability: Not on file    Transportation needs:     Medical: Not on file     Non-medical: Not on file   Tobacco Use    Smoking status: Never Smoker    Smokeless tobacco: Never Used   Substance and Sexual Activity    Alcohol use: Yes     Comment: very rare    Drug use: No    Sexual activity: Yes     Partners: Female   Lifestyle    Physical activity:     Days per week: Not on file     Minutes per session: Not on file    Stress: Not on file   Relationships    Social connections:     Talks on phone: Not on file     Gets together: Not on file     Attends Hoahaoism service: Not on file     Active member of club or organization: Not on file     Attends meetings of clubs or organizations: Not on file     Relationship status: Not on file    Intimate partner violence:     Fear of current or ex partner: Not on file     Emotionally abused: Not on file     Physically abused: Not on file     Forced sexual activity: Not on file   Other Topics Concern    Not on file   Social History Narrative    Not on file       FAMILY HISTORY:  Family History   Problem Relation Age of Onset    Cancer Father         Breast and Colon    Cancer Mother         LEUKEMIA    No Known Problems Sister     No Known Problems Brother     No Known Problems Sister     Anesth Problems Neg Hx        REVIEW OF SYSTEMS: Complete ROS assessed and noted for that which is described above, all else are negative.   Eyes: normal  ENT: normal  CVS: normal  Resp: normal  GI: normal  : normal  GYN: normal  Endocrine: normal  Integument: normal  Musculoskeletal: normal  Neuro: normal  Psych: normal      PHYSICAL EXAMINATION:    VITAL SIGNS:  Visit Vitals  /69 (BP 1 Location: Left arm, BP Patient Position: Sitting)   Pulse 73   Ht 5' 8\" (1.727 m)   Wt 187 lb (84.8 kg)   BMI 28.43 kg/m²       GENERAL: NCAT, Sitting comfortably, NAD  EYES: EOMI, non-icteric, no proptosis  Ear/Nose/Throat: NCAT, no inflammation, no masses  LYMPH NODES: No LAD  CARDIOVASCULAR: S1 S2, RRR, No murmur, 2+ radial pulses  RESPIRATORY: CTA b/l, no wheeze/rales  GASTROINTESTINAL: NT, ND  MUSCULOSKELETAL: Normal ROM, no atrophy  SKIN: warm, no edema/rash/ or other skin changes  NEUROLOGIC: 5/5 power all extremities, no tremor, AAOx3  PSYCHIATRIC: Normal affect, Normal insight and judgement    Diabetic foot exam:     Left Foot:   Visual Exam: normal    Pulse DP: 2+ (normal)   Filament test: normal sensation    Vibratory sensation: Vibratory sensation: normal       Right Foot:   Visual Exam: normal    Pulse DP: 2+ (normal)   Filament test: normal sensation    Vibratory sensation: Vibratory sensation: normal      REVIEW OF LABORATORY AND RADIOLOGY DATA:   Labs and documentation have been reviewed as described above. ASSESSMENT AND PLAN:   Evelin Bernal is a 59 y.o. male with a PMHx as noted above who presents for evaluation of uncontrolled type 2 diabetes. Problems:  Type 2 diabetes Uncontrolled  Hyperlipidemia  Hypertension    We had the pleasure of reviewing together the basics of diabetes including basic pathophysiology and diabetes care. We further discussed the importance of checking home glucose regularly and takin all of their scheduled medications in order to have the best possible outcome. I was able to answer any questions they had in clinic today and they are invited to reach me if they have any further questions.  Based upon our discussion together today we have decided to make the following changes:    * Patient has had a whipple procedure and it is not clear if he has adequate insulin reserve. He has been on just metformin until now. His home fasting sugars of 180-220 and above is not consistent with his A1c of 7.0% and I suspect his home sugar should be a more reliable indicator. We discussed the need to avoid incretin based therapies due to prior recurrent pancreatitis. We noted that sulfonylureas may not be the best next medicine. We discussed using an SGLT-2 inhibitor. We discussed the mechanism of action for this medication in the kidney. We also discussed the rare but possible adverse effects which include acidosis and yeast infections etc however I believe that the benefit of better diabetes control far outweighs the risks of treating with this class of medications. The patient agrees to a trial which I think will produce good results and contribute to the reduction of their cardiovascular risks from uncontrolled diabetes. ONE THING IS CERTAIN HOWEVER, we must check his c-peptide/insulin levels to assure he has adequate reserve, as he would need a daily injection of insulin he does not produce insulin well following his surgery. He will do this blood work soon. PLAN  Type 2 Diabetes  Medications:  Start Jardiance 25mg each morning  Continue metformin 500mg once daily  Advised to check glucose each morning, and occasionally at bedtime (2 hrs at bedtime)   Provided with glucose log sheets for later review. HTN: BP stable on current medications, no changes today. HLD: no recent level, will need to check this year. RTC: I would like to see them back in 3 months. 60 minutes spent together with patient today of which >50% of this time was spent in counseling and coordination of care. Caryn Frank.  [address] [facility]

## 2019-06-05 LAB
ALBUMIN/CREAT UR: 3.2 MG/G CREAT (ref 0–30)
CREAT UR-MCNC: 123.3 MG/DL
MICROALBUMIN UR-MCNC: 4 UG/ML

## 2019-06-07 LAB
BUN SERPL-MCNC: 21 MG/DL (ref 8–27)
BUN/CREAT SERPL: 23 (ref 10–24)
C PEPTIDE SERPL-MCNC: 3.6 NG/ML (ref 1.1–4.4)
CALCIUM SERPL-MCNC: 9.1 MG/DL (ref 8.6–10.2)
CHLORIDE SERPL-SCNC: 101 MMOL/L (ref 96–106)
CO2 SERPL-SCNC: 24 MMOL/L (ref 20–29)
CREAT SERPL-MCNC: 0.93 MG/DL (ref 0.76–1.27)
GAD65 AB SER IA-ACNC: <5 U/ML (ref 0–5)
GLUCOSE SERPL-MCNC: 149 MG/DL (ref 65–99)
ISLET CELL512 AB SER-ACNC: <7.5 U/ML
Lab: NORMAL
POTASSIUM SERPL-SCNC: 4.5 MMOL/L (ref 3.5–5.2)
SODIUM SERPL-SCNC: 139 MMOL/L (ref 134–144)

## 2019-06-11 NOTE — PROGRESS NOTES
C-peptide level looks healthy at 3.6 (1.1-4.4) with serum glucose of 149 at the time,   Pancreatic autoantibodies are negative,  Findings suggest that trial with jardiance with the metformin is quite reasonable,  I called to notify patient and his wife,   Reports his AM sugars have come down from the 200's to the 140's so far,    Fernanda Maza.  [facility]  [address] [place] [address]

## 2019-07-25 ENCOUNTER — OFFICE VISIT (OUTPATIENT)
Dept: PRIMARY CARE CLINIC | Age: 64
End: 2019-07-25

## 2019-07-25 VITALS
OXYGEN SATURATION: 95 % | BODY MASS INDEX: 27.89 KG/M2 | RESPIRATION RATE: 18 BRPM | HEIGHT: 68 IN | DIASTOLIC BLOOD PRESSURE: 61 MMHG | SYSTOLIC BLOOD PRESSURE: 95 MMHG | HEART RATE: 72 BPM | TEMPERATURE: 98 F | WEIGHT: 184 LBS

## 2019-07-25 DIAGNOSIS — H60.331 ACUTE SWIMMER'S EAR OF RIGHT SIDE: Primary | ICD-10-CM

## 2019-07-25 RX ORDER — NEOMYCIN SULFATE, POLYMYXIN B SULFATE AND HYDROCORTISONE 10; 3.5; 1 MG/ML; MG/ML; [USP'U]/ML
3 SUSPENSION/ DROPS AURICULAR (OTIC) 3 TIMES DAILY
Qty: 10 ML | Refills: 0 | Status: SHIPPED | OUTPATIENT
Start: 2019-07-25 | End: 2019-08-01

## 2019-07-25 NOTE — PROGRESS NOTES
Subjective:      Keith Cunningham is a 59 y.o. male who presents for possible ear infection. Symptoms include right ear pain and plugged sensation in right ear. Onset of symptoms was 2 days ago, gradually worsening since that time. Associated symptoms include low grade fever, which have been present for 2 days . He is drinking plenty of fluids. Past Medical History:   Diagnosis Date    Arrhythmia     Previous a.fib, ablation, NSR now; NO LONGER FOLLOWED BY CARDIOLOGIST.  Autoimmune disease [address]     SJOGREN'S    Cancer ([facility]     COMMON BILE DUCT, ADENOCARCINOMA    Hypertension     Ill-defined condition     pre-diabetic - diet controlled    Sepsis ([facility] 2017    STENTING TO BILE DUCT    Status post chemotherapy     Stroke [facility]) 2008    brain aneurysm - no deficits     Current Outpatient Medications   Medication Sig Dispense Refill    neomycin-polymyxin-hydrocortisone, buffered, (PEDIOTIC) 3.5-10,000-1 mg/mL-unit/mL-% otic suspension Administer 3 Drops in right ear three (3) times daily for 7 days. 10 mL 0    Blood-Glucose Meter monitoring kit 1 preferred brand glucometer for checking home glucose 1 Kit 0    lancets misc Use preferred brand; Check glucose 1 time daily, Diagnosis E11.65 100 Each 3    glucose blood VI test strips (PHARMACIST CHOICE) strip Use preferred brand; Check glucose 1 times daily, Diagnosis E11.65 100 Strip 3    empagliflozin (JARDIANCE) 25 mg tablet Take 1 Tab by mouth daily. 90 Tab 3    gabapentin (NEURONTIN) 300 mg capsule TAKE 3 CAPS BY MOUTH TWO (2) TIMES A DAY. 540 Cap 3    aspirin delayed-release 81 mg tablet Take 81 mg by mouth.  famotidine (PEPCID) 20 mg tablet Take 20 mg by mouth.  sildenafil citrate (VIAGRA) 50 mg tablet Take 1 Tab by mouth as needed (ED). 30 Tab 1    promethazine (PHENERGAN) 25 mg tablet Take 1 Tab by mouth every six (6) hours as needed for Nausea.  30 Tab 1    tamsulosin (FLOMAX) 0.4 mg capsule TAKE ONE CAPSULE BY MOUTH EVERY EVENING  0    metFORMIN ER (GLUCOPHAGE XR) 500 mg tablet Take 1 Tab by mouth two (2) times a day. (Patient taking differently: Take 500 mg by mouth daily.) 60 Tab 11    ramipril (ALTACE) 10 mg capsule Take 1 Cap by mouth nightly. 90 Cap 3    cyanocobalamin (VITAMIN B12) 1,000 mcg/mL injection INJECT CONTENTS OF ONE VIAL INTRAMUSCULARLY EVERY OTHER WEEK 6 mL 6    alpha-D-galactosidase (BEANO PO) Take 1 Tab by mouth two (2) times a day.  calcium carbonate (TUMS) 200 mg calcium (500 mg) chew Take 2 Tabs by mouth three (3) times daily as needed (indigestion).  pantoprazole (PROTONIX) 40 mg tablet TAKE 1 BY MOUTH EVERY MORNING FOR 30 DAYS  5    cholecalciferol, vitamin D3, (VITAMIN D3 PO) Take 1 Tab by mouth daily.  cetirizine (ZYRTEC) 10 mg tablet Take 10 mg by mouth nightly.        Allergies   Allergen Reactions    Shellfish Derived Anaphylaxis     Soft shell crabs    Pcn [Penicillins] Other (comments)     Pt stated \"always been told PCN\"     Social History     Socioeconomic History    Marital status:      Spouse name: Not on file    Number of children: Not on file    Years of education: Not on file    Highest education level: Not on file   Occupational History    Not on file   Social Needs    Financial resource strain: Not on file    Food insecurity:     Worry: Not on file     Inability: Not on file    Transportation needs:     Medical: Not on file     Non-medical: Not on file   Tobacco Use    Smoking status: Never Smoker    Smokeless tobacco: Never Used   Substance and Sexual Activity    Alcohol use: Yes     Comment: very rare    Drug use: No    Sexual activity: Yes     Partners: Female   Lifestyle    Physical activity:     Days per week: Not on file     Minutes per session: Not on file    Stress: Not on file   Relationships    Social connections:     Talks on phone: Not on file     Gets together: Not on file     Attends Pentecostalism service: Not on file     Active member of club or organization: Not on file     Attends meetings of clubs or organizations: Not on file     Relationship status: Not on file    Intimate partner violence:     Fear of current or ex partner: Not on file     Emotionally abused: Not on file     Physically abused: Not on file     Forced sexual activity: Not on file   Other Topics Concern    Not on file   Social History Narrative    Not on file     Review of Systems  A comprehensive review of systems was negative except for that written in the HPI. Objective:     Visit Vitals  BP 95/61 (BP 1 Location: Left arm, BP Patient Position: Sitting)   Pulse 72   Temp 98 °F (36.7 °C) (Oral)   Resp 18   Ht 5' 8\" (1.727 m)   Wt 184 lb (83.5 kg)   SpO2 95%   BMI 27.98 kg/m²     General:  alert, cooperative, no distress, appears stated age   Head:  NCAT w/o lesions or tenderness   Eyes: negative   Right Ear: right canal inflamed, with millky discharge, tender with movement of pinna   Left Ear: normal appearance   Mouth:  Lips, mucosa, and tongue normal. Teeth and gums normal   Neck: supple, symmetrical, trachea midline, no adenopathy, thyroid: not enlarged, symmetric, no tenderness/mass/nodules, no carotid bruit and no JVD. Lungs: clear to auscultation bilaterally   Heart:  regular rate and rhythm, S1, S2 normal, no murmur, click, rub or gallop   Skin: Normal.        Assessment/Plan:     otitis externa of the right ear    1. Treatment:  pedotic drops TID x 7 days  2. OTC meds (ibuprofen- doses discussed), fluids, rest, avoid carbonated/ alcoholic, and caffeinated beverages. 3.  Follow up with PCP in 3 days if not improving. ICD-10-CM ICD-9-CM    1. Acute swimmer's ear of right side H60.331 380.12 neomycin-polymyxin-hydrocortisone, buffered, (PEDIOTIC) 3.5-10,000-1 mg/mL-unit/mL-% otic suspension   .

## 2019-07-25 NOTE — PROGRESS NOTES
Isabela Sow is a 59 y.o. male  Identified pt with two pt identifiers(name and ). Reviewed record in preparation for visit and have obtained necessary documentation. Chief Complaint   Patient presents with    Ear Swelling     X 3 days right ear, patient states fever last night of 100.0, advil taken      Visit Vitals  BP 95/61 (BP 1 Location: Left arm, BP Patient Position: Sitting)   Pulse 72   Temp 98 °F (36.7 °C) (Oral)   Resp 18   Ht 5' 8\" (1.727 m)   Wt 184 lb (83.5 kg)   SpO2 95%   BMI 27.98 kg/m²       Pain Scale: 0 - No pain/10  Pain Location:       Health Maintenance Due   Topic    EYE EXAM RETINAL OR DILATED     Shingrix Vaccine Age 49> (1 of 2)       Coordination of Care Questionnaire:  :   1) Have you been to an emergency room, urgent care, or hospitalized since your last visit? If yes, where when, and reason for visit? no       2. Have seen or consulted any other health care provider since your last visit? If yes, where when, and reason for visit? NO      3) Do you have an Advanced Directive/ Living Will in place? NO  If yes, do we have a copy on file NO  If no, would you like information NO    Patient is accompanied by self I have received verbal consent from Isabela Sow to discuss any/all medical information while they are present in the room.

## 2019-07-25 NOTE — PATIENT INSTRUCTIONS
Swimmer's Ear: Care Instructions  Your Care Instructions    Swimmer's ear (otitis externa) is inflammation or infection of the ear canal. This is the passage that leads from the outer ear to the eardrum. Any water, sand, or other debris that gets into the ear canal and stays there can cause swimmer's ear. Putting cotton swabs or other items in the ear to clean it can also cause this problem. Swimmer's ear can be very painful. But you can treat the pain and infection with medicines. You should feel better in a few days. Follow-up care is a key part of your treatment and safety. Be sure to make and go to all appointments, and call your doctor if you are having problems. It's also a good idea to know your test results and keep a list of the medicines you take. How can you care for yourself at home? Cleaning and care  · Use antibiotic drops as your doctor directs. · Do not insert ear drops (other than the antibiotic ear drops) or anything else into the ear unless your doctor has told you to. · Avoid getting water in the ear until the problem clears up. Use cotton lightly coated with petroleum jelly as an earplug. Do not use plastic earplugs. · Use a hair dryer set on low to carefully dry the ear after you shower. · To ease ear pain, hold a warm washcloth against your ear. · Take pain medicines exactly as directed. ? If the doctor gave you a prescription medicine for pain, take it as prescribed. ? If you are not taking a prescription pain medicine, ask your doctor if you can take an over-the-counter medicine. Inserting ear drops  · Warm the drops to body temperature by rolling the container in your hands. Or you can place it in a cup of warm water for a few minutes. · Lie down, with your ear facing up. · Place drops inside the ear. Follow your doctor's instructions (or the directions on the label) for how many drops to use.  Gently wiggle the outer ear or pull the ear up and back to help the drops get into the ear. · It's important to keep the liquid in the ear canal for 3 to 5 minutes. When should you call for help? Call your doctor now or seek immediate medical care if:    · You have a new or higher fever.     · You have new or worse pain, swelling, warmth, or redness around or behind your ear.     · You have new or increasing pus or blood draining from your ear.    Watch closely for changes in your health, and be sure to contact your doctor if:    · You are not getting better after 2 days (48 hours). Where can you learn more? Go to http://sandra-jenelle.info/. Enter C706 in the search box to learn more about \"Swimmer's Ear: Care Instructions. \"  Current as of: October 21, 2018  Content Version: 12.1  © 0780-4611 [employer]. Care instructions adapted under license by [employer] (which disclaims liability or warranty for this information). If you have questions about a medical condition or this instruction, always ask your healthcare professional. [address] any warranty or liability for your use of this information.

## 2019-08-13 ENCOUNTER — OFFICE VISIT (OUTPATIENT)
Dept: DERMATOLOGY | Facility: AMBULATORY SURGERY CENTER | Age: 64
End: 2019-08-13

## 2019-08-13 VITALS
WEIGHT: 184 LBS | HEART RATE: 72 BPM | HEIGHT: 68 IN | BODY MASS INDEX: 27.89 KG/M2 | OXYGEN SATURATION: 95 % | DIASTOLIC BLOOD PRESSURE: 78 MMHG | TEMPERATURE: 97.7 F | SYSTOLIC BLOOD PRESSURE: 120 MMHG

## 2019-08-13 DIAGNOSIS — D18.01 CHERRY ANGIOMA: ICD-10-CM

## 2019-08-13 DIAGNOSIS — L57.0 ACTINIC KERATOSIS: Primary | ICD-10-CM

## 2019-08-13 DIAGNOSIS — D37.01: ICD-10-CM

## 2019-08-13 DIAGNOSIS — D22.9 MULTIPLE BENIGN NEVI: ICD-10-CM

## 2019-08-13 DIAGNOSIS — L82.1 SEBORRHEIC KERATOSES: ICD-10-CM

## 2019-08-13 DIAGNOSIS — L56.8 ACTINIC CHEILITIS: ICD-10-CM

## 2019-08-13 NOTE — PROGRESS NOTES
Written by Chandrakant Ling, as dictated by Nidia Izaguirre, [address]. Name: Venita Muhammad       Age: 59 y.o. Date: 8/13/2019    Chief Complaint:   Chief Complaint   Patient presents with    Skin Exam     full body/top head and spot on lip       Subjective:    HPI  Mr. Venita Muhammad is a 59 y.o. male who presents for a full skin exam.  The patient's last skin exam was on 1/4/18 and the patient does have current complaints related to his skin. The patient reports that he tolerated Efudex well on the scalp without complication and has noticed significant improvement. He has scaly lesions that he is concerned about on the scalp and a lesion on the inner lip that is brown and without symptoms. He also notes dryness and scaling on the lip that appears intermittently. He is feeling well and in his usual state of health today. He has no current illnesses, no other skin concerns. His allergies, medications, medical, and social history are reviewed by me today. The patient's pertinent skin history includes : AK    ROS: Constitutional: Negative.     Dermatological : positive for - skin lesion changes      Social History     Socioeconomic History    Marital status:      Spouse name: Not on file    Number of children: Not on file    Years of education: Not on file    Highest education level: Not on file   Occupational History    Not on file   Social Needs    Financial resource strain: Not on file    Food insecurity:     Worry: Not on file     Inability: Not on file    Transportation needs:     Medical: Not on file     Non-medical: Not on file   Tobacco Use    Smoking status: Never Smoker    Smokeless tobacco: Never Used   Substance and Sexual Activity    Alcohol use: Yes     Comment: very rare    Drug use: No    Sexual activity: Yes     Partners: Female   Lifestyle    Physical activity:     Days per week: Not on file     Minutes per session: Not on file    Stress: Not on file Relationships    Social connections:     Talks on phone: Not on file     Gets together: Not on file     Attends Bahai service: Not on file     Active member of club or organization: Not on file     Attends meetings of clubs or organizations: Not on file     Relationship status: Not on file    Intimate partner violence:     Fear of current or ex partner: Not on file     Emotionally abused: Not on file     Physically abused: Not on file     Forced sexual activity: Not on file   Other Topics Concern    Not on file   Social History Narrative    Not on file       Family History   Problem Relation Age of Onset    Cancer Father         Breast and Colon    Cancer Mother         LEUKEMIA    No Known Problems Sister     No Known Problems Brother     No Known Problems Sister     Anesth Problems Neg Hx        Past Medical History:   Diagnosis Date    Arrhythmia     Previous a.fib, ablation, NSR now; NO LONGER FOLLOWED BY CARDIOLOGIST.     Autoimmune disease ([facility] [address]     SJOGREN'S    Cancer ([facility] [address]     COMMON BILE DUCT, ADENOCARCINOMA    Family history of skin cancer     Hypertension     Ill-defined condition     pre-diabetic - diet controlled    Sepsis ([facility] [address] 2017    STENTING TO BILE DUCT    Status post chemotherapy     Stroke [facility]) 2008    brain aneurysm - no deficits    Sun-damaged skin     Sunburn, blistering        Past Surgical History:   Procedure Laterality Date    ABDOMEN SURGERY PROC UNLISTED  10/2017    Whipple     HX GI      COLONOSCOPY, POLYPS (BENIGN)    HX GI  10/06/2017    [facility]     [address]  2005    Ablation     HX ORTHOPAEDIC  2000    Spinal fusion W/ HARDWARE    HX OTHER SURGICAL  11/07/2017    [place] [facility], Dr. Raquel Smith  2017    PORTACATH - then removed    NEUROLOGICAL PROCEDURE UNLISTED  2005    [place] hole washout from cerebral hemorrhage       Current Outpatient Medications   Medication Sig Dispense Refill    Blood-Glucose Meter monitoring kit 1 preferred brand glucometer for checking home glucose 1 Kit 0    lancets misc Use preferred brand; Check glucose 1 time daily, Diagnosis E11.65 100 Each 3    glucose blood VI test strips (PHARMACIST CHOICE) strip Use preferred brand; Check glucose 1 times daily, Diagnosis E11.65 100 Strip 3    empagliflozin (JARDIANCE) 25 mg tablet Take 1 Tab by mouth daily. 90 Tab 3    gabapentin (NEURONTIN) 300 mg capsule TAKE 3 CAPS BY MOUTH TWO (2) TIMES A DAY. 540 Cap 3    aspirin delayed-release 81 mg tablet Take 81 mg by mouth.  famotidine (PEPCID) 20 mg tablet Take 20 mg by mouth.  tamsulosin (FLOMAX) 0.4 mg capsule TAKE ONE CAPSULE BY MOUTH EVERY EVENING  0    metFORMIN ER (GLUCOPHAGE XR) 500 mg tablet Take 1 Tab by mouth two (2) times a day. (Patient taking differently: Take 500 mg by mouth daily.) 60 Tab 11    ramipril (ALTACE) 10 mg capsule Take 1 Cap by mouth nightly. 90 Cap 3    cyanocobalamin (VITAMIN B12) 1,000 mcg/mL injection INJECT CONTENTS OF ONE VIAL INTRAMUSCULARLY EVERY OTHER WEEK 6 mL 6    alpha-D-galactosidase (BEANO PO) Take 1 Tab by mouth two (2) times a day.  pantoprazole (PROTONIX) 40 mg tablet TAKE 1 BY MOUTH EVERY MORNING FOR 30 DAYS  5    cholecalciferol, vitamin D3, (VITAMIN D3 PO) Take 1 Tab by mouth daily.  cetirizine (ZYRTEC) 10 mg tablet Take 10 mg by mouth nightly.  sildenafil citrate (VIAGRA) 50 mg tablet Take 1 Tab by mouth as needed (ED). 30 Tab 1    promethazine (PHENERGAN) 25 mg tablet Take 1 Tab by mouth every six (6) hours as needed for Nausea. 30 Tab 1    calcium carbonate (TUMS) 200 mg calcium (500 mg) chew Take 2 Tabs by mouth three (3) times daily as needed (indigestion).          Allergies   Allergen Reactions    Shellfish Derived Anaphylaxis     Soft shell crabs    Pcn [Penicillins] Other (comments)     Pt stated \"always been told PCN\"         Objective:    Visit Vitals  /78 (BP 1 Location: Left arm, BP Patient Position: Sitting)   Pulse 72   Temp 97.7 °F (36.5 °C) (Oral)   Ht 5' 8\" (1.727 m)   Wt 184 lb (83.5 kg)   SpO2 95%   BMI 27.98 kg/m²       Elizabeth Murphy is a 59 y.o. male who appears well and in no distress. He is awake, alert, and oriented. A skin examination was performed including his scalp, face (including eyelids), ears, neck, chest, back, abdomen, upper extremities (including digits/nails), lower extremities, breasts; genital skin was not examined. He has actinic keratoses on the scalp x 3 but a vast reduction is noted. He has scattered waxy macules and keratotic papules consistent with seborrheic keratoses. He has scattered red papules consistent with cherry angiomas. He has pink intradermal nevi and brown junctional nevi, no concerning features for severe atypia. He has a 5 x 3 mm medium brown macule on the lip, including the lesion of his concern. He has slight scaling diffusely along the lower lip most consistent with actinic cheilitis. No mass of the lower lip is noted on palpation. Photos from today's visit:     Left lower inner lip    Assessment/Plan:  1. Actinic Keratoses. The diagnosis of this precancerous lesion related to sun exposure was reviewed. Verbal consent was obtained. I treated 3 lesions with cryotherapy and post-cryotherapy care was reviewed. 2.Seborrheic keratoses. The diagnosis was reviewed and the patient was reassured that no treatment is needed for these benign lesions. 3. Cherry angiomas. The diagnosis was reviewed and the patient was reassured that no treatment is needed for these benign lesions. 4. Normal nevi. The diagnosis of normal nevi was reviewed. I discussed sun protection, sunscreen use, the warning signs of skin cancer, mole monitoring, the need for self-skin examinations, and the need for regular practitioner exams. The patient should follow up sooner as needed if new, changing, or symptomatic skin lesions arise.     5. Mucosal pigment, inner lip.  The differential diagnoses were discussed. I encouraged self monitoring and recommended follow up with his dentist for further monitoring/ evaluation. 6. Actinic cheilitis. The diagnosis was discussed. I advised the patient to use lip balms that contain sunscreen. Next skin exam:  1 year      This plan was reviewed with the patient and patient agrees. All questions were answered. This scribe documentation was reviewed by me and accurately reflects the examination and decisions made by me. [facility]   OFFICE PROCEDURE PROGRESS NOTE   Chart reviewed for the following:   IPiotr, have reviewed the History, Physical and updated the Allergic reactions for Marcus Shed. TIME OUT performed immediately prior to start of procedure:   Karlee SHERIDAN, have performed the following reviews on Carliss Shed   prior to the start of the procedure:     * Patient was identified by name and date of birth   * Agreement on procedure being performed was verified   * Risks and Benefits explained to the patient   * Procedure site verified and marked as necessary   * Patient was positioned for comfort   * Consent was signed and verified     Time: 11:05 AM  Date of procedure: 8/13/2019  Procedure performed by: Neida Wisdom.  Stefan Fernández DNP  Provider assisted by: self  Patient assisted by: self   How tolerated by patient: tolerated the procedure well with no complications   Comments: none

## 2019-08-21 ENCOUNTER — HOSPITAL ENCOUNTER (OUTPATIENT)
Dept: CT IMAGING | Age: 64
Discharge: HOME | End: 2019-08-21
Attending: INTERNAL MEDICINE
Payer: COMMERCIAL

## 2019-08-21 DIAGNOSIS — C22.1 CHOLANGIOCARCINOMA OF BILIARY TRACT (HCC): ICD-10-CM

## 2019-08-21 PROCEDURE — 74011636320 HC RX REV CODE- 636/320: Performed by: INTERNAL MEDICINE

## 2019-08-21 PROCEDURE — 74177 CT ABD & PELVIS W/CONTRAST: CPT

## 2019-08-21 PROCEDURE — 74011000255 HC RX REV CODE- 255: Performed by: INTERNAL MEDICINE

## 2019-08-21 RX ORDER — SODIUM CHLORIDE 0.9 % (FLUSH) 0.9 %
10 SYRINGE (ML) INJECTION
Status: COMPLETED | OUTPATIENT
Start: 2019-08-21 | End: 2019-08-21

## 2019-08-21 RX ORDER — BARIUM SULFATE 20 MG/ML
900 SUSPENSION ORAL
Status: COMPLETED | OUTPATIENT
Start: 2019-08-21 | End: 2019-08-21

## 2019-08-21 RX ADMIN — BARIUM SULFATE 450 ML: 21 SUSPENSION ORAL at 08:43

## 2019-08-21 RX ADMIN — Medication 10 ML: at 08:43

## 2019-08-21 RX ADMIN — IOPAMIDOL 100 ML: 755 INJECTION, SOLUTION INTRAVENOUS at 08:42

## 2019-08-27 NOTE — PROGRESS NOTES
[facility]  at [address] [place] [address]  [place], [address] [facility]  [phone]       Follow-up Note      Patient: Nika Shi MRN: [id number]  SSN: [id number]    YOB: 1955  Age: 59 y.o. Sex: male      Diagnosis:     1. 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 S/P Pancreatoduodenectomy on  10/06/2017  2. Adjuvant monotherapy with Capecitabine - s/p 6 cycles   (12/4/2017 - 05/2018)    Subjective:      Nika Sih is a 59 y.o. male with a diagnosis of extrahepatic cholangiocarcinoma. He presented to the ED in August 2017 with obstructive jaundice. He was found to have an obstructive lesion in the dital bile duct. The CT showed marked intrahepatic biliary dilation and common bile duct dilation to the level of the mid common bile duct, which ws markedly narrowed. He underwent a stenting procedure followed by spyglass cholangiogram. The brushings revealed a diagnosis of cholangiocarcinoma. He underwent a Whipple procedure on 10/06/2017. He completed 6 cycles of adjuvant Xeloda. Mr. Obdulia Joiner returns today to discuss his most recently scans. He is doing well and does not verbalize any new complaints. Review of Systems:    Constitutional: negative  Eyes: negative  Ears, Nose, Mouth, Throat, and Face: negative  Respiratory: negative  Cardiovascular: negative  Gastrointestinal: negative  Genitourinary:negative  Integument/Breast: negative  Hematologic/Lymphatic: negative  Musculoskeletal:negative  Neurological: negative      Past Medical History:   Diagnosis Date    Arrhythmia     Previous a.fib, ablation, NSR now; NO LONGER FOLLOWED BY CARDIOLOGIST.     Autoimmune disease ([address]     SJOGREN'S    Cancer ([address]     COMMON BILE DUCT, ADENOCARCINOMA    Family history of skin cancer     Hypertension     Ill-defined condition     pre-diabetic - diet controlled    Sepsis ([facility] [address] 2017    STENTING TO BILE DUCT    Status post chemotherapy     Stroke [facility]) 2008    brain aneurysm - no deficits    Sun-damaged skin     Sunburn, blistering      Past Surgical History:   Procedure Laterality Date    ABDOMEN SURGERY PROC UNLISTED  10/2017    [place]     HX GI      COLONOSCOPY, POLYPS (BENIGN)    HX GI  10/06/2017    [facility]     HX HEART CATHETERIZATION  2005    Ablation     HX ORTHOPAEDIC  2000    Spinal fusion W/ HARDWARE    HX OTHER SURGICAL  11/07/2017    [place] [facility], Dr. Janneth Chirinos  2017    PORTACATH - then removed    NEUROLOGICAL PROCEDURE UNLISTED  2005    [facility] hole washout from cerebral hemorrhage      Family History   Problem Relation Age of Onset    Cancer Father         Breast and Colon    Cancer Mother         LEUKEMIA    No Known Problems Sister     No Known Problems Brother     No Known Problems Sister     Anesth Problems Neg Hx      Social History     Tobacco Use    Smoking status: Never Smoker    Smokeless tobacco: Never Used   Substance Use Topics    Alcohol use: Yes     Comment: very rare      Prior to Admission medications    Medication Sig Start Date End Date Taking? Authorizing Provider   ondansetron (ZOFRAN ODT) 8 mg disintegrating tablet DISSOLVE 1 TAB BY MOUTH EVERY 8 HOURS AS NEEDED FOR NAUSEA. 5/30/19  Yes Provider, Historical   Karlee Easter METER misc USE AS DIRECTED 5/30/19  Yes Provider, Historical   ONETOUCH DELICA LANCETS 30 gauge misc USE AS DIRECTED 5/30/19  Yes Provider, Historical   Blood-Glucose Meter monitoring kit 1 preferred brand glucometer for checking home glucose 5/30/19  Yes Tomeka Lee MD   lancets misc Use preferred brand; Check glucose 1 time daily, Diagnosis E11.65 5/30/19  Yes Tomeka Lee MD   glucose blood VI test strips (PHARMACIST CHOICE) strip Use preferred brand;  Check glucose 1 times daily, Diagnosis E11.65 5/30/19  Yes Tomeka Lee MD empagliflozin (JARDIANCE) 25 mg tablet Take 1 Tab by mouth daily. 5/30/19  Yes Brady Charles MD   gabapentin (NEURONTIN) 300 mg capsule TAKE 3 CAPS BY MOUTH TWO (2) TIMES A DAY. 5/15/19  Yes Silas Raya III, DO   aspirin delayed-release 81 mg tablet Take 81 mg by mouth. Yes Provider, Historical   famotidine (PEPCID) 20 mg tablet Take 20 mg by mouth. 2/22/11  Yes Provider, Historical   sildenafil citrate (VIAGRA) 50 mg tablet Take 1 Tab by mouth as needed (ED). 5/6/19  Yes Silas Raya III,    promethazine (PHENERGAN) 25 mg tablet Take 1 Tab by mouth every six (6) hours as needed for Nausea. 5/6/19  Yes Silas Raya III, DO   tamsulosin (FLOMAX) 0.4 mg capsule TAKE ONE CAPSULE BY MOUTH EVERY EVENING 2/28/19  Yes Provider, Historical   metFORMIN ER (GLUCOPHAGE XR) 500 mg tablet Take 1 Tab by mouth two (2) times a day. Patient taking differently: Take 500 mg by mouth daily. 1/14/19  Yes Silas Raya III,    ramipril (ALTACE) 10 mg capsule Take 1 Cap by mouth nightly. 1/4/19  Yes Silas Raya III, DO   cyanocobalamin (VITAMIN B12) 1,000 mcg/mL injection INJECT CONTENTS OF ONE VIAL INTRAMUSCULARLY EVERY OTHER WEEK 9/20/18  Yes Silas Raya III, DO   alpha-D-galactosidase (BEANO PO) Take 1 Tab by mouth two (2) times a day. Yes Other, MD Flynn   calcium carbonate (TUMS) 200 mg calcium (500 mg) chew Take 2 Tabs by mouth three (3) times daily as needed (indigestion). Yes Other, MD Flynn   pantoprazole (PROTONIX) 40 mg tablet TAKE 1 BY MOUTH EVERY MORNING FOR 30 DAYS 7/22/18  Yes Provider, Historical   cholecalciferol, vitamin D3, (VITAMIN D3 PO) Take 1 Tab by mouth daily. Yes Provider, Historical   cetirizine (ZYRTEC) 10 mg tablet Take 10 mg by mouth nightly.    Yes Provider, Historical          Allergies   Allergen Reactions    Shellfish Derived Anaphylaxis     Soft shell crabs    Pcn [Penicillins] Other (comments)     Pt stated \"always been told PCN\"           Objective:     Vitals:    08/28/19 0923   BP: 115/65   Pulse: 70   Resp: 18   Temp: 98.3 °F (36.8 °C)   TempSrc: Oral   SpO2: 95%   Weight: 185 lb 6.4 oz (84.1 kg)   Height: 5' 8\" (1.727 m)        Pain Scale: 0 - No pain/10          Physical Exam:    GENERAL: alert, cooperative, no distress, appears stated age  EYE: negative  LYMPHATIC: Cervical, supraclavicular, and axillary nodes normal.   THROAT & NECK: normal and no erythema or exudates noted. LUNG: clear to auscultation bilaterally  HEART: regular rate and rhythm  ABDOMEN: soft, non-tender  EXTREMITIES:  no edema  SKIN: Normal.  NEUROLOGIC: negative        CT Results (most recent):  Results from Hospital Encounter encounter on 08/21/19   CT ABD PELV W CONT    Narrative EXAM: CT ABD PELV W CONT    INDICATION: history of choliangiocarcinoma    COMPARISON: 2/21/2019     CONTRAST: 100 mL of Isovue-370. TECHNIQUE:   Following the uneventful intravenous administration of contrast, thin axial  images were obtained through the abdomen and pelvis. Coronal and sagittal  reconstructions were generated. Oral contrast was administered. CT dose  reduction was achieved through use of a standardized protocol tailored for this  examination and automatic exposure control for dose modulation. FINDINGS:   LUNG BASES: Clear. INCIDENTALLY IMAGED HEART AND MEDIASTINUM: Coronary atherosclerotic disease. LIVER: Pneumobilia. Status post Whipple procedure with choledocho jejunostomy. GALLBLADDER: Surgically absent  SPLEEN: No mass. PANCREAS: Status post Whipple procedure. Persistent mild dilatation of the  pancreatic duct. ADRENALS: Unremarkable. KIDNEYS: No mass, calculus, or hydronephrosis. STOMACH: Status post Whipple procedure with gastrojejunostomy. SMALL BOWEL: No significant bowel wall thickening or bowel dilatation. COLON: Colonic diverticulosis. No evidence of diverticulitis.  The appendix is  normal.  APPENDIX: Unremarkable  PERITONEUM: No free fluid  RETROPERITONEUM: Evidence of aneurysm. Small retroperitoneal lymph node  measuring approximately 8 mm unchanged from the prior study. Posterior to the  common hepatic artery. . Mild atherosclerotic disease  REPRODUCTIVE ORGANS: Unremarkable  URINARY BLADDER: No mass or calculus. BONES: Unremarkable     ADDITIONAL COMMENTS: N/A      Impression IMPRESSION:  1. Status post Whipple procedure. No evidence of residual recurrent disease. Small retroperitoneal lymph node posterior to the right common hepatic artery is  unchanged. 2.  Previously noted hydronephrosis has resolved. I personally reviewed the images. No evidence of metastatic disease. Assessment:     1. Extrahepatic cholangiocarcinoma:    T3 N1 (1 of 12 LN +ve)  Invasion into pancreas  R0 resection    > S/P Pancreatoduodenectomy on  10/06/2017    Completed adjuvant treatment with single agent Capecitabine - s/p 6 cycles (12/4/2017 - 05/2018). CT abd/pelvis 8/21/2019 - no evidence of recurrent disease    Asymptomatic  In remission  Continue surveillance      Plan:       > Labs CBC, CMP and CA 19-9  > CT Abd Pelv in 6 months  > Follow-up in 6 months        Signed by: Ginny Chavez MD                     August 28, 2019          CC. Elaine Blanchard MD  CC. Donna Felder MD  CC. Hernandez Smith MD  CC.  Ariella Samaniego MD

## 2019-08-28 ENCOUNTER — OFFICE VISIT (OUTPATIENT)
Dept: ONCOLOGY | Age: 64
End: 2019-08-28

## 2019-08-28 VITALS
BODY MASS INDEX: 28.1 KG/M2 | WEIGHT: 185.4 LBS | DIASTOLIC BLOOD PRESSURE: 65 MMHG | TEMPERATURE: 98.3 F | HEIGHT: 68 IN | OXYGEN SATURATION: 95 % | SYSTOLIC BLOOD PRESSURE: 115 MMHG | RESPIRATION RATE: 18 BRPM | HEART RATE: 70 BPM

## 2019-08-28 DIAGNOSIS — C22.1 CHOLANGIOCARCINOMA OF BILIARY TRACT (HCC): ICD-10-CM

## 2019-08-28 DIAGNOSIS — Z85.09 HISTORY OF BILE DUCT CANCER: Primary | ICD-10-CM

## 2019-08-28 RX ORDER — BLOOD-GLUCOSE METER
EACH MISCELLANEOUS
Refills: 0 | COMMUNITY
Start: 2019-05-30 | End: 2019-12-02

## 2019-08-28 RX ORDER — ONDANSETRON 8 MG/1
TABLET, ORALLY DISINTEGRATING ORAL
Refills: 0 | COMMUNITY
Start: 2019-05-30 | End: 2019-11-20

## 2019-08-28 RX ORDER — LANCETS 30 GAUGE
EACH MISCELLANEOUS
Refills: 3 | COMMUNITY
Start: 2019-05-30 | End: 2019-12-02

## 2019-08-28 NOTE — PROGRESS NOTES
Chief Complaint   Patient presents with    Cancer     extrahepatic cholangiocarcinoma f/u     1. Have you been to the ER, urgent care clinic since your last visit? Hospitalized since your last visit? No    2. Have you seen or consulted any other health care providers outside of the [address] [place] [address] since your last visit? Include any pap smears or colon screening.  No

## 2019-09-03 ENCOUNTER — OFFICE VISIT (OUTPATIENT)
Dept: ENDOCRINOLOGY | Age: 64
End: 2019-09-03

## 2019-09-03 VITALS
HEIGHT: 68 IN | BODY MASS INDEX: 28.04 KG/M2 | WEIGHT: 185 LBS | DIASTOLIC BLOOD PRESSURE: 73 MMHG | HEART RATE: 90 BPM | SYSTOLIC BLOOD PRESSURE: 102 MMHG

## 2019-09-03 DIAGNOSIS — I10 ESSENTIAL HYPERTENSION WITH GOAL BLOOD PRESSURE LESS THAN 130/80: ICD-10-CM

## 2019-09-03 DIAGNOSIS — E11.9 TYPE 2 DIABETES MELLITUS WITHOUT COMPLICATION, WITHOUT LONG-TERM CURRENT USE OF INSULIN (HCC): Primary | ICD-10-CM

## 2019-09-03 LAB — HBA1C MFR BLD HPLC: 6.4 %

## 2019-09-03 NOTE — PATIENT INSTRUCTIONS
Jardiance 25mg each morning    Continue metformin 500mg once daily    See you back in 5 months,      Abbe LOPEZ.  [facility]  [address] [place] [address]

## 2019-09-03 NOTE — PROGRESS NOTES
CHIEF COMPLAINT: evaluation of type 2 diabetes    HISTORY OF PRESENT ILLNESS:   Elizabeth Murphy is a 59 y.o. male with a PMHx as noted below who presents for evaluation of uncontrolled type 2 diabetes. Diabetes was diagnosed 2 years ago following whipple procedure,  Had a whipple procedure for cholangiocarcinoma at the time,  Family History of diabetes is positive in his father,  C-peptide level checked: healthy at 3.6 (1.1-4.4) with serum glucose of 149 at the time,   Pancreatic autoantibodies are negative,  We added Jardiance with his metformin as a trial,   Hx of pancreatitis several times so we avoided DPP-4 inhibitors. His A1c today is 6.4%. Current Home Regimen:  - metformin 500mg once daily (does not tolerate more than this)  - Jardiance 25mg once daily    Review of home glucose:  Meter reviewed:   AM sugars 117-160 range    Review of most recent diabetes-related labs:  Lab Results   Component Value Date    HBA1C 6.6 (H) 10/30/2018    HBA1C 6.5 (H) 06/07/2018    HBA1C 6.0 (H) 10/26/2017    KVA6EGNH 7.0 05/06/2019    MWX3HUNB 7.6 08/31/2018    CHOL 76 (L) 10/30/2018    LDLC 41 10/30/2018    GFRAA 101 06/03/2019    GFRNA 88 06/03/2019    MCACR 3.2 05/30/2019    495162 <7.5 06/03/2019    GADLT <5.0 06/03/2019    CPEPLT 3.6 06/03/2019    VITD3 49.3 10/26/2017    B12LT 928 10/26/2017     Lab Key:  [id number] = IA-2 pancreatic islet cell autoantibody  CPEPL = C-peptide level  :EXT = External Lab  GADLT = SANAM-65 autoantibody   INSUL = Insulin level  MCACR (or MALBEXT) = Urine Microalbumin (or External UM)  B12LT = B12 level    PAST MEDICAL/SURGICAL HISTORY:   Past Medical History:   Diagnosis Date    Arrhythmia     Previous a.fib, ablation, NSR now; NO LONGER FOLLOWED BY CARDIOLOGIST.     Autoimmune disease ([facility] [address]     SJOGREN'S    Cancer ([facility] [address]     COMMON BILE DUCT, ADENOCARCINOMA    Family history of skin cancer     Hypertension     Ill-defined condition     pre-diabetic - diet controlled    Sepsis ([facility] [address] 2017    STENTING TO BILE DUCT    Status post chemotherapy     Stroke [facility]) 2008    brain aneurysm - no deficits    Sun-damaged skin     Sunburn, blistering      Past Surgical History:   Procedure Laterality Date    ABDOMEN SURGERY PROC UNLISTED  10/2017    Viktor     HX GI      COLONOSCOPY, POLYPS (BENIGN)    HX GI  10/06/2017    [facility]     [facility] [address]  2005    Ablation     HX ORTHOPAEDIC  2000    Spinal fusion W/ HARDWARE    HX OTHER SURGICAL  11/07/2017    [place] Cath, Dr. Jackson Mckeon  2017    PORTACATH - then removed    NEUROLOGICAL PROCEDURE UNLISTED  2005    Ting hole washout from cerebral hemorrhage       ALLERGIES:   Allergies   Allergen Reactions    Shellfish Derived Anaphylaxis     Soft shell crabs    Pcn [Penicillins] Other (comments)     Pt stated \"always been told PCN\"       MEDICATIONS ON ADMISSION:     Current Outpatient Medications:     ONETOUCH ULTRA2 METER misc, USE AS DIRECTED, Disp: , Rfl: 0    ONETOUCH DELICA LANCETS 30 gauge misc, USE AS DIRECTED, Disp: , Rfl: 3    Blood-Glucose Meter monitoring kit, 1 preferred brand glucometer for checking home glucose, Disp: 1 Kit, Rfl: 0    lancets misc, Use preferred brand; Check glucose 1 time daily, Diagnosis E11.65, Disp: 100 Each, Rfl: 3    glucose blood VI test strips (PHARMACIST CHOICE) strip, Use preferred brand; Check glucose 1 times daily, Diagnosis E11.65, Disp: 100 Strip, Rfl: 3    empagliflozin (JARDIANCE) 25 mg tablet, Take 1 Tab by mouth daily. , Disp: 90 Tab, Rfl: 3    gabapentin (NEURONTIN) 300 mg capsule, TAKE 3 CAPS BY MOUTH TWO (2) TIMES A DAY., Disp: 540 Cap, Rfl: 3    aspirin delayed-release 81 mg tablet, Take 81 mg by mouth., Disp: , Rfl:     tamsulosin (FLOMAX) 0.4 mg capsule, TAKE ONE CAPSULE BY MOUTH EVERY EVENING, Disp: , Rfl: 0    metFORMIN ER (GLUCOPHAGE XR) 500 mg tablet, Take 1 Tab by mouth two (2) times a day.  (Patient taking differently: Take 500 mg by mouth daily.), Disp: 60 Tab, Rfl: 11    ramipril (ALTACE) 10 mg capsule, Take 1 Cap by mouth nightly., Disp: 90 Cap, Rfl: 3    cyanocobalamin (VITAMIN B12) 1,000 mcg/mL injection, INJECT CONTENTS OF ONE VIAL INTRAMUSCULARLY EVERY OTHER WEEK, Disp: 6 mL, Rfl: 6    alpha-D-galactosidase (BEANO PO), Take 1 Tab by mouth two (2) times a day., Disp: , Rfl:     pantoprazole (PROTONIX) 40 mg tablet, TAKE 1 BY MOUTH EVERY MORNING FOR 30 DAYS, Disp: , Rfl: 5    cholecalciferol, vitamin D3, (VITAMIN D3 PO), Take 1 Tab by mouth daily. , Disp: , Rfl:     cetirizine (ZYRTEC) 10 mg tablet, Take 10 mg by mouth nightly., Disp: , Rfl:     ondansetron (ZOFRAN ODT) 8 mg disintegrating tablet, DISSOLVE 1 TAB BY MOUTH EVERY 8 HOURS AS NEEDED FOR NAUSEA., Disp: , Rfl: 0    famotidine (PEPCID) 20 mg tablet, Take 20 mg by mouth., Disp: , Rfl:     sildenafil citrate (VIAGRA) 50 mg tablet, Take 1 Tab by mouth as needed (ED)., Disp: 30 Tab, Rfl: 1    promethazine (PHENERGAN) 25 mg tablet, Take 1 Tab by mouth every six (6) hours as needed for Nausea., Disp: 30 Tab, Rfl: 1    calcium carbonate (TUMS) 200 mg calcium (500 mg) chew, Take 2 Tabs by mouth three (3) times daily as needed (indigestion). , Disp: , Rfl:     SOCIAL HISTORY:   Social History     Socioeconomic History    Marital status:      Spouse name: Not on file    Number of children: Not on file    Years of education: Not on file    Highest education level: Not on file   Occupational History    Not on file   Social Needs    Financial resource strain: Not on file    Food insecurity:     Worry: Not on file     Inability: Not on file    Transportation needs:     Medical: Not on file     Non-medical: Not on file   Tobacco Use    Smoking status: Never Smoker    Smokeless tobacco: Never Used   Substance and Sexual Activity    Alcohol use: Yes     Comment: very rare    Drug use: No    Sexual activity: Yes     Partners: Female   Lifestyle    Physical activity: Days per week: Not on file     Minutes per session: Not on file    Stress: Not on file   Relationships    Social connections:     Talks on phone: Not on file     Gets together: Not on file     Attends Protestant service: Not on file     Active member of club or organization: Not on file     Attends meetings of clubs or organizations: Not on file     Relationship status: Not on file    Intimate partner violence:     Fear of current or ex partner: Not on file     Emotionally abused: Not on file     Physically abused: Not on file     Forced sexual activity: Not on file   Other Topics Concern    Not on file   Social History Narrative    Not on file       FAMILY HISTORY:  Family History   Problem Relation Age of Onset    Cancer Father         Breast and Colon    Cancer Mother         LEUKEMIA    No Known Problems Sister     No Known Problems Brother     No Known Problems Sister     Anesth Problems Neg Hx        REVIEW OF SYSTEMS: Complete ROS assessed and noted for that which is described above, all else are negative.   Eyes: normal  ENT: normal  CVS: normal  Resp: normal  GI: normal  : normal  GYN: normal  Endocrine: normal  Integument: normal  Musculoskeletal: normal  Neuro: normal  Psych: normal      PHYSICAL EXAMINATION:    VITAL SIGNS:  Visit Vitals  /73 (BP 1 Location: Left arm, BP Patient Position: Sitting)   Pulse 90   Ht 5' 8\" (1.727 m)   Wt 185 lb (83.9 kg)   BMI 28.13 kg/m²       GENERAL: NCAT, Sitting comfortably, NAD  EYES: EOMI, non-icteric, no proptosis  Ear/Nose/Throat: NCAT, no inflammation, no masses  LYMPH NODES: No LAD  CARDIOVASCULAR: S1 S2, RRR, No murmur, 2+ radial pulses  RESPIRATORY: CTA b/l, no wheeze/rales  GASTROINTESTINAL: NT, ND  MUSCULOSKELETAL: Normal ROM, no atrophy  SKIN: warm, no edema/rash/ or other skin changes  NEUROLOGIC: 5/5 power all extremities, no tremor, AAOx3  PSYCHIATRIC: Normal affect, Normal insight and judgement      REVIEW OF LABORATORY AND RADIOLOGY DATA:   Labs and documentation have been reviewed as described above. ASSESSMENT AND PLAN:   Ngozi Simons is a 59 y.o. male with a PMHx as noted above who presents for evaluation of uncontrolled type 2 diabetes. Problems:  Type 2 diabetes Uncontrolled  Hyperlipidemia  Hypertension    Though he had a whipple procedure, he has a good supply of endogenous insulin production and no evidence of pancreatic autoimmunity. Current regimen providing good control for now. PLAN  Type 2 Diabetes  Medications:  Jardiance 25mg each morning  Continue metformin 500mg once daily    HTN: BP stable on current medications  HLD: stable    RTC: I would like to see them back in 5 months. Isaura Hill.  [address] [facility]

## 2019-09-18 RX ORDER — METFORMIN HYDROCHLORIDE 500 MG/1
TABLET, EXTENDED RELEASE ORAL
Qty: 180 TAB | Refills: 3 | Status: ON HOLD | OUTPATIENT
Start: 2019-09-18 | End: 2019-11-20 | Stop reason: SDUPTHER

## 2019-09-18 RX ORDER — METFORMIN HYDROCHLORIDE 500 MG/1
500 TABLET, EXTENDED RELEASE ORAL 2 TIMES DAILY
Qty: 60 TAB | Refills: 11 | Status: SHIPPED | OUTPATIENT
Start: 2019-09-18 | End: 2019-11-20

## 2019-11-05 ENCOUNTER — HOSPITAL ENCOUNTER (EMERGENCY)
Age: 64
Discharge: HOME | End: 2019-11-05
Attending: EMERGENCY MEDICINE
Payer: COMMERCIAL

## 2019-11-05 VITALS
BODY MASS INDEX: 26.73 KG/M2 | WEIGHT: 176.37 LBS | TEMPERATURE: 97.4 F | RESPIRATION RATE: 18 BRPM | HEIGHT: 68 IN | SYSTOLIC BLOOD PRESSURE: 127 MMHG | HEART RATE: 71 BPM | OXYGEN SATURATION: 96 % | DIASTOLIC BLOOD PRESSURE: 82 MMHG

## 2019-11-05 DIAGNOSIS — K29.90 GASTRITIS AND DUODENITIS: Primary | ICD-10-CM

## 2019-11-05 LAB
ALBUMIN SERPL-MCNC: 3.5 G/DL (ref 3.5–5)
ALBUMIN/GLOB SERPL: 1.3 {RATIO} (ref 1.1–2.2)
ALP SERPL-CCNC: 102 U/L (ref 45–117)
ALT SERPL-CCNC: 24 U/L (ref 12–78)
ANION GAP SERPL CALC-SCNC: 7 MMOL/L (ref 5–15)
APPEARANCE UR: CLEAR
AST SERPL-CCNC: 7 U/L (ref 15–37)
BACTERIA URNS QL MICRO: NEGATIVE /HPF
BASOPHILS # BLD: 0 K/UL (ref 0–0.1)
BASOPHILS NFR BLD: 1 % (ref 0–1)
BILIRUB SERPL-MCNC: 1.1 MG/DL (ref 0.2–1)
BILIRUB UR QL CFM: NEGATIVE
BUN SERPL-MCNC: 16 MG/DL (ref 6–20)
BUN/CREAT SERPL: 18 (ref 12–20)
CALCIUM SERPL-MCNC: 8.8 MG/DL (ref 8.5–10.1)
CHLORIDE SERPL-SCNC: 108 MMOL/L (ref 97–108)
CO2 SERPL-SCNC: 26 MMOL/L (ref 21–32)
COLOR UR: ABNORMAL
CREAT SERPL-MCNC: 0.87 MG/DL (ref 0.7–1.3)
DIFFERENTIAL METHOD BLD: NORMAL
EOSINOPHIL # BLD: 0.3 K/UL (ref 0–0.4)
EOSINOPHIL NFR BLD: 5 % (ref 0–7)
EPITH CASTS URNS QL MICRO: ABNORMAL /LPF
ERYTHROCYTE [DISTWIDTH] IN BLOOD BY AUTOMATED COUNT: 14.5 % (ref 11.5–14.5)
GLOBULIN SER CALC-MCNC: 2.6 G/DL (ref 2–4)
GLUCOSE SERPL-MCNC: 120 MG/DL (ref 65–100)
GLUCOSE UR STRIP.AUTO-MCNC: >1000 MG/DL
HCT VFR BLD AUTO: 46.7 % (ref 36.6–50.3)
HGB BLD-MCNC: 15.2 G/DL (ref 12.1–17)
HGB UR QL STRIP: ABNORMAL
HYALINE CASTS URNS QL MICRO: ABNORMAL /LPF (ref 0–5)
IMM GRANULOCYTES # BLD AUTO: 0 K/UL (ref 0–0.04)
IMM GRANULOCYTES NFR BLD AUTO: 0 % (ref 0–0.5)
KETONES UR QL STRIP.AUTO: NEGATIVE MG/DL
LEUKOCYTE ESTERASE UR QL STRIP.AUTO: NEGATIVE
LIPASE SERPL-CCNC: 50 U/L (ref 73–393)
LYMPHOCYTES # BLD: 1 K/UL (ref 0.8–3.5)
LYMPHOCYTES NFR BLD: 16 % (ref 12–49)
MCH RBC QN AUTO: 29.3 PG (ref 26–34)
MCHC RBC AUTO-ENTMCNC: 32.5 G/DL (ref 30–36.5)
MCV RBC AUTO: 90 FL (ref 80–99)
MONOCYTES # BLD: 0.6 K/UL (ref 0–1)
MONOCYTES NFR BLD: 9 % (ref 5–13)
NEUTS SEG # BLD: 4.4 K/UL (ref 1.8–8)
NEUTS SEG NFR BLD: 69 % (ref 32–75)
NITRITE UR QL STRIP.AUTO: NEGATIVE
NRBC # BLD: 0 K/UL (ref 0–0.01)
NRBC BLD-RTO: 0 PER 100 WBC
PH UR STRIP: 5.5 [PH] (ref 5–8)
PLATELET # BLD AUTO: 162 K/UL (ref 150–400)
PMV BLD AUTO: 11.4 FL (ref 8.9–12.9)
POTASSIUM SERPL-SCNC: 3.5 MMOL/L (ref 3.5–5.1)
PROT SERPL-MCNC: 6.1 G/DL (ref 6.4–8.2)
PROT UR STRIP-MCNC: NEGATIVE MG/DL
RBC # BLD AUTO: 5.19 M/UL (ref 4.1–5.7)
RBC #/AREA URNS HPF: ABNORMAL /HPF (ref 0–5)
SODIUM SERPL-SCNC: 141 MMOL/L (ref 136–145)
SP GR UR REFRACTOMETRY: >1.03 (ref 1–1.03)
UA: UC IF INDICATED,UAUC: ABNORMAL
UROBILINOGEN UR QL STRIP.AUTO: 1 EU/DL (ref 0.2–1)
WBC # BLD AUTO: 6.4 K/UL (ref 4.1–11.1)
WBC URNS QL MICRO: ABNORMAL /HPF (ref 0–4)

## 2019-11-05 PROCEDURE — 74011000250 HC RX REV CODE- 250: Performed by: EMERGENCY MEDICINE

## 2019-11-05 PROCEDURE — 85025 COMPLETE CBC W/AUTO DIFF WBC: CPT

## 2019-11-05 PROCEDURE — 99284 EMERGENCY DEPT VISIT MOD MDM: CPT

## 2019-11-05 PROCEDURE — 74011250637 HC RX REV CODE- 250/637: Performed by: EMERGENCY MEDICINE

## 2019-11-05 PROCEDURE — 80053 COMPREHEN METABOLIC PANEL: CPT

## 2019-11-05 PROCEDURE — 36415 COLL VENOUS BLD VENIPUNCTURE: CPT

## 2019-11-05 PROCEDURE — 81001 URINALYSIS AUTO W/SCOPE: CPT

## 2019-11-05 PROCEDURE — 83690 ASSAY OF LIPASE: CPT

## 2019-11-05 RX ORDER — OMEPRAZOLE 40 MG/1
40 CAPSULE, DELAYED RELEASE ORAL DAILY
Qty: 30 CAP | Refills: 0 | Status: SHIPPED | OUTPATIENT
Start: 2019-11-05 | End: 2020-10-26

## 2019-11-05 RX ADMIN — LIDOCAINE HYDROCHLORIDE 40 ML: 20 SOLUTION ORAL; TOPICAL at 15:53

## 2019-11-05 NOTE — DISCHARGE INSTRUCTIONS
Patient Education        Gastritis: Care Instructions  Your Care Instructions    Gastritis is a sore and upset stomach. It happens when something irritates the stomach lining. Many things can cause it. These include an infection such as the flu or something you ate or drank. Medicines or a sore on the lining of the stomach (ulcer) also can cause it. Your belly may bloat and ache. You may belch, vomit, and feel sick to your stomach. You should be able to relieve the problem by taking medicine. And it may help to change your diet. If gastritis lasts, your doctor may prescribe medicine. Follow-up care is a key part of your treatment and safety. Be sure to make and go to all appointments, and call your doctor if you are having problems. It's also a good idea to know your test results and keep a list of the medicines you take. How can you care for yourself at home? · If your doctor prescribed antibiotics, take them as directed. Do not stop taking them just because you feel better. You need to take the full course of antibiotics. · Be safe with medicines. If your doctor prescribed medicine to decrease stomach acid, take it as directed. Call your doctor if you think you are having a problem with your medicine. · Do not take any other medicine, including over-the-counter pain relievers, without talking to your doctor first.  · If your doctor recommends over-the-counter medicine to reduce stomach acid, such as Pepcid AC, Prilosec, Tagamet HB, or Zantac 75, follow the directions on the label. · Drink plenty of fluids (enough so that your urine is light yellow or clear like water) to prevent dehydration. Choose water and other caffeine-free clear liquids. If you have kidney, heart, or liver disease and have to limit fluids, talk with your doctor before you increase the amount of fluids you drink. · Limit how much alcohol you drink. · Avoid coffee, tea, cola drinks, chocolate, and other foods with caffeine.  They increase stomach acid. When should you call for help? Call 911 anytime you think you may need emergency care. For example, call if:    · You vomit blood or what looks like coffee grounds.     · You pass maroon or very bloody stools.    Call your doctor now or seek immediate medical care if:    · You start breathing fast and have not produced urine in the last 8 hours.     · You cannot keep fluids down.    Watch closely for changes in your health, and be sure to contact your doctor if:    · You do not get better as expected. Where can you learn more? Go to http://sandra-jenelle.info/. Enter [id number] in the search box to learn more about \"Gastritis: Care Instructions. \"  Current as of: November 7, 2018  Content Version: 12.2  © 6015-2550 [employer]. Care instructions adapted under license by [employer] (which disclaims liability or warranty for this information). If you have questions about a medical condition or this instruction, always ask your healthcare professional. [address] any warranty or liability for your use of this information.

## 2019-11-05 NOTE — ED NOTES
Assumed care of patient. Patient is alert and oriented, does not appear to be in distress. Patient c/o mid diffuse abdominal pain with n/v/d/and constipation following a course of antibiotic for PNA. Monitor x 2 applied. VSS     Patient positioned for comfort with call bell within reach. Side rails up for safety. Provider to evaluate patient.

## 2019-11-05 NOTE — ED NOTES
Discharge paperwork provided to patient at this time. Vital signs stable. Patient in no apparent distress at this time. Mental status at baseline. Discharge paperwork in hand and prescription instructions if applicable.

## 2019-11-05 NOTE — ED PROVIDER NOTES
EMERGENCY DEPARTMENT HISTORY AND PHYSICAL EXAM      Date: 11/5/2019  Patient Name: Genet Lambert  Patient Age and Sex: 62 y.o. male     History of Presenting Illness     Chief Complaint   Patient presents with    Abdominal Pain     Pt ambualtory to triage with c/o mid to lower abdominal pain radiating to lower back x 2 weeks; hx of pancreatitis; states N/V/D        History Provided By: Patient    HPI: Genet Lambert is a 23-year-old male with a history of Whipple procedure 2 years ago and pancreatitis, presenting with epigastric pain. Patient states that for the past 2 weeks has been having mid epigastric pain radiating to the left. It is associated with nausea, vomiting, and intermittent diarrhea and constipation. Denies any fevers associated with it, dysuria, fatigue, however is having some back pain. Does not have any history of gastritis. There are no other complaints, changes, or physical findings at this time. PCP: Ashly Aden, DO    No current facility-administered medications on file prior to encounter. Current Outpatient Medications on File Prior to Encounter   Medication Sig Dispense Refill    metFORMIN ER (GLUCOPHAGE XR) 500 mg tablet TAKE 1 TABLET BY MOUTH TWICE A  Tab 3    metFORMIN ER (GLUCOPHAGE XR) 500 mg tablet Take 1 Tab by mouth two (2) times a day. 60 Tab 11    ondansetron (ZOFRAN ODT) 8 mg disintegrating tablet DISSOLVE 1 TAB BY MOUTH EVERY 8 HOURS AS NEEDED FOR NAUSEA.  0    ONETOUCH ULTRA2 METER misc USE AS DIRECTED  0    ONETOUCH DELICA LANCETS 30 gauge misc USE AS DIRECTED  3    Blood-Glucose Meter monitoring kit 1 preferred brand glucometer for checking home glucose 1 Kit 0    lancets misc Use preferred brand; Check glucose 1 time daily, Diagnosis E11.65 100 Each 3    glucose blood VI test strips (PHARMACIST CHOICE) strip Use preferred brand;  Check glucose 1 times daily, Diagnosis E11.65 100 Strip 3    empagliflozin (JARDIANCE) 25 mg tablet Take 1 Tab by mouth daily. 90 Tab 3    gabapentin (NEURONTIN) 300 mg capsule TAKE 3 CAPS BY MOUTH TWO (2) TIMES A DAY. 540 Cap 3    aspirin delayed-release 81 mg tablet Take 81 mg by mouth.  famotidine (PEPCID) 20 mg tablet Take 20 mg by mouth.  sildenafil citrate (VIAGRA) 50 mg tablet Take 1 Tab by mouth as needed (ED). 30 Tab 1    promethazine (PHENERGAN) 25 mg tablet Take 1 Tab by mouth every six (6) hours as needed for Nausea. 30 Tab 1    tamsulosin (FLOMAX) 0.4 mg capsule TAKE ONE CAPSULE BY MOUTH EVERY EVENING  0    ramipril (ALTACE) 10 mg capsule Take 1 Cap by mouth nightly. 90 Cap 3    cyanocobalamin (VITAMIN B12) 1,000 mcg/mL injection INJECT CONTENTS OF ONE VIAL INTRAMUSCULARLY EVERY OTHER WEEK 6 mL 6    alpha-D-galactosidase (BEANO PO) Take 1 Tab by mouth two (2) times a day.  calcium carbonate (TUMS) 200 mg calcium (500 mg) chew Take 2 Tabs by mouth three (3) times daily as needed (indigestion).  pantoprazole (PROTONIX) 40 mg tablet TAKE 1 BY MOUTH EVERY MORNING FOR 30 DAYS  5    cholecalciferol, vitamin D3, (VITAMIN D3 PO) Take 1 Tab by mouth daily.  cetirizine (ZYRTEC) 10 mg tablet Take 10 mg by mouth nightly. Past History     Past Medical History:  Past Medical History:   Diagnosis Date    Arrhythmia     Previous a.fib, ablation, NSR now; NO LONGER FOLLOWED BY CARDIOLOGIST.     Autoimmune disease ([address]     SJOGREN'S    Cancer ([address]     COMMON BILE DUCT, ADENOCARCINOMA    Family history of skin cancer     Hypertension     Ill-defined condition     pre-diabetic - diet controlled    Sepsis ([address] 2017    STENTING TO BILE DUCT    Status post chemotherapy     Stroke [facility]) 2008    brain aneurysm - no deficits    Sun-damaged skin     Sunburn, blistering        Past Surgical History:  Past Surgical History:   Procedure Laterality Date    ABDOMEN SURGERY PROC UNLISTED  10/2017    Whipple     HX GI      COLONOSCOPY, POLYPS (BENIGN)    HX GI  10/06/2017 Viktor Dang [address]     Nancy Garcia [facility] [address]  2005    Ablation     HX ORTHOPAEDIC  2000    Spinal fusion W/ HARDWARE    HX OTHER SURGICAL  11/07/2017    Dr. Jeromy Lemus  2017    PORTACATH - then removed    NEUROLOGICAL PROCEDURE UNLISTED  2005    Ting hole washout from cerebral hemorrhage       Family History:  Family History   Problem Relation Age of Onset    Cancer Father         Breast and Colon    Cancer Mother         LEUKEMIA    No Known Problems Sister     No Known Problems Brother     No Known Problems Sister     Anesth Problems Neg Hx        Social History:  Social History     Tobacco Use    Smoking status: Never Smoker    Smokeless tobacco: Never Used   Substance Use Topics    Alcohol use: Yes     Comment: very rare    Drug use: No       Allergies: Allergies   Allergen Reactions    Shellfish Derived Anaphylaxis     Soft shell crabs    Pcn [Penicillins] Other (comments)     Pt stated \"always been told PCN\"         Review of Systems   Review of Systems   Constitutional: Negative for chills and fever. Respiratory: Negative for cough and shortness of breath. Cardiovascular: Negative for chest pain. Gastrointestinal: Positive for abdominal pain, constipation, diarrhea, nausea and vomiting. Genitourinary: Negative for flank pain. Musculoskeletal: Positive for back pain. Neurological: Negative for weakness and numbness. All other systems reviewed and are negative. Physical Exam   Physical Exam   Constitutional: He is oriented to person, place, and time. He appears well-developed and well-nourished. HENT:   Head: Normocephalic and atraumatic. Eyes: Conjunctivae and EOM are normal.   Neck: Normal range of motion. Neck supple. Cardiovascular: Normal rate and regular rhythm. Pulmonary/Chest: Effort normal and breath sounds normal. No respiratory distress. Abdominal: Soft. He exhibits no distension. There is tenderness.    Tender to palpation in the epigastric region   Musculoskeletal: Normal range of motion. Neurological: He is alert and oriented to person, place, and time. Skin: Skin is warm and dry. Psychiatric: He has a normal mood and affect. Nursing note and vitals reviewed. Diagnostic Study Results     Labs -     Recent Results (from the past 12 hour(s))   CBC WITH AUTOMATED DIFF    Collection Time: 11/05/19  2:38 PM   Result Value Ref Range    WBC 6.4 4.1 - 11.1 K/uL    RBC 5.19 4. 10 - 5.70 M/uL    HGB 15.2 12.1 - 17.0 g/dL    HCT 46.7 36.6 - 50.3 %    MCV 90.0 80.0 - 99.0 FL    MCH 29.3 26.0 - 34.0 PG    MCHC 32.5 30.0 - 36.5 g/dL    RDW 14.5 11.5 - 14.5 %    PLATELET 874 615 - 424 K/uL    MPV 11.4 8.9 - 12.9 FL    NRBC 0.0 0  WBC    ABSOLUTE NRBC 0.00 0.00 - 0.01 K/uL    NEUTROPHILS 69 32 - 75 %    LYMPHOCYTES 16 12 - 49 %    MONOCYTES 9 5 - 13 %    EOSINOPHILS 5 0 - 7 %    BASOPHILS 1 0 - 1 %    IMMATURE GRANULOCYTES 0 0.0 - 0.5 %    ABS. NEUTROPHILS 4.4 1.8 - 8.0 K/UL    ABS. LYMPHOCYTES 1.0 0.8 - 3.5 K/UL    ABS. MONOCYTES 0.6 0.0 - 1.0 K/UL    ABS. EOSINOPHILS 0.3 0.0 - 0.4 K/UL    ABS. BASOPHILS 0.0 0.0 - 0.1 K/UL    ABS. IMM.  GRANS. 0.0 0.00 - 0.04 K/UL    DF AUTOMATED     URINALYSIS W/ REFLEX CULTURE    Collection Time: 11/05/19  2:38 PM   Result Value Ref Range    Color YELLOW/STRAW      Appearance CLEAR CLEAR      Specific gravity >1.030 (H) 1.003 - 1.030    pH (UA) 5.5 5.0 - 8.0      Protein NEGATIVE  NEG mg/dL    Glucose >1,000 (A) NEG mg/dL    Ketone NEGATIVE  NEG mg/dL    Blood TRACE (A) NEG      Urobilinogen 1.0 0.2 - 1.0 EU/dL    Nitrites NEGATIVE  NEG      Leukocyte Esterase NEGATIVE  NEG      UA:UC IF INDICATED CULTURE NOT INDICATED BY UA RESULT CNI      WBC 0-4 0 - 4 /hpf    RBC 0-5 0 - 5 /hpf    Epithelial cells FEW FEW /lpf    Bacteria NEGATIVE  NEG /hpf    Hyaline cast 0-2 0 - 5 /lpf   BILIRUBIN, CONFIRM    Collection Time: 11/05/19  2:38 PM   Result Value Ref Range    Bilirubin UA, confirm NEGATIVE  NEG     LIPASE    Collection Time: 11/05/19  4:06 PM   Result Value Ref Range    Lipase 50 (L) 73 - 350 U/L   METABOLIC PANEL, COMPREHENSIVE    Collection Time: 11/05/19  4:06 PM   Result Value Ref Range    Sodium 141 136 - 145 mmol/L    Potassium 3.5 3.5 - 5.1 mmol/L    Chloride 108 97 - 108 mmol/L    CO2 26 21 - 32 mmol/L    Anion gap 7 5 - 15 mmol/L    Glucose 120 (H) 65 - 100 mg/dL    BUN 16 6 - 20 MG/DL    Creatinine 0.87 0.70 - 1.30 MG/DL    BUN/Creatinine ratio 18 12 - 20      GFR est AA >60 >60 ml/min/1.73m2    GFR est non-AA >60 >60 ml/min/1.73m2    Calcium 8.8 8.5 - 10.1 MG/DL    Bilirubin, total 1.1 (H) 0.2 - 1.0 MG/DL    ALT (SGPT) 24 12 - 78 U/L    AST (SGOT) 7 (L) 15 - 37 U/L    Alk. phosphatase 102 45 - 117 U/L    Protein, total 6.1 (L) 6.4 - 8.2 g/dL    Albumin 3.5 3.5 - 5.0 g/dL    Globulin 2.6 2.0 - 4.0 g/dL    A-G Ratio 1.3 1.1 - 2.2         Radiologic Studies -   No orders to display     CT Results  (Last 48 hours)    None        CXR Results  (Last 48 hours)    None            Medical Decision Making   I am the first provider for this patient. I reviewed the vital signs, available nursing notes, past medical history, past surgical history, family history and social history. Vital Signs-Reviewed the patient's vital signs. Patient Vitals for the past 12 hrs:   Temp Pulse Resp BP SpO2   11/05/19 1615    127/82 96 %   11/05/19 1459  71 18 123/71 96 %   11/05/19 1418 97.4 °F (36.3 °C) 76 16 137/79 95 %       Records Reviewed: Nursing Notes and Old Medical Records    Provider Notes (Medical Decision Making):   Patient presents with epigastric abdominal pain. Differential includes gastritis, pancreatitis, cholelithiasis, cholecystitis, hepatitis, muscular strain, renal pathology, gastroenteritis. Less likely ACS. Will obtain labs and possibly US. Will give analgesics and antiemetics PRN. ED Course:   Initial assessment performed.  The patients presenting problems have been discussed, and they are in agreement with the care plan formulated and outlined with them. I have encouraged them to ask questions as they arise throughout their visit. Critical Care Time:   0    Disposition:  Discharge Note:  The patient has been re-evaluated and is ready for discharge. Reviewed available results with patient. Counseled patient on diagnosis and care plan. Patient has expressed understanding, and all questions have been answered. Patient agrees with plan and agrees to follow up as recommended, or to return to the ED if their symptoms worsen. Discharge instructions have been provided and explained to the patient, along with reasons to return to the ED. PLAN:  Discharge Medication List as of 11/5/2019  4:46 PM      START taking these medications    Details   omeprazole (PRILOSEC) 40 mg capsule Take 1 Cap by mouth daily. , Normal, Disp-30 Cap, R-0         CONTINUE these medications which have NOT CHANGED    Details   !! metFORMIN ER (GLUCOPHAGE XR) 500 mg tablet TAKE 1 TABLET BY MOUTH TWICE A DAY, Normal, Disp-180 Tab, R-3      !! metFORMIN ER (GLUCOPHAGE XR) 500 mg tablet Take 1 Tab by mouth two (2) times a day., Normal, Disp-60 Tab, R-11      ondansetron (ZOFRAN ODT) 8 mg disintegrating tablet DISSOLVE 1 TAB BY MOUTH EVERY 8 HOURS AS NEEDED FOR NAUSEA., Historical Med, R-0      ONETOUCH ULTRA2 METER misc USE AS DIRECTED, Historical Med, R-0, PREETI      !! ONETOUCH DELICA LANCETS 30 gauge misc USE AS DIRECTED, Historical Med, R-3, PREETI      Blood-Glucose Meter monitoring kit 1 preferred brand glucometer for checking home glucose, Normal, Disp-1 Kit, R-0      !! lancets misc Use preferred brand; Check glucose 1 time daily, Diagnosis E11.65, Normal, Disp-100 Each, R-3      glucose blood VI test strips (PHARMACIST CHOICE) strip Use preferred brand; Check glucose 1 times daily, Diagnosis E11.65, Normal, Disp-100 Strip, R-3      empagliflozin (JARDIANCE) 25 mg tablet Take 1 Tab by mouth daily. , Normal, Disp-90 Tab, R-3      gabapentin (NEURONTIN) 300 mg capsule TAKE 3 CAPS BY MOUTH TWO (2) TIMES A DAY., Normal, Disp-540 Cap, R-3      aspirin delayed-release 81 mg tablet Take 81 mg by mouth., Historical Med      famotidine (PEPCID) 20 mg tablet Take 20 mg by mouth., Historical Med      sildenafil citrate (VIAGRA) 50 mg tablet Take 1 Tab by mouth as needed (ED)., Normal, Disp-30 Tab, R-1      promethazine (PHENERGAN) 25 mg tablet Take 1 Tab by mouth every six (6) hours as needed for Nausea., Normal, Disp-30 Tab, R-1      tamsulosin (FLOMAX) 0.4 mg capsule TAKE ONE CAPSULE BY MOUTH EVERY EVENING, Historical Med, R-0      ramipril (ALTACE) 10 mg capsule Take 1 Cap by mouth nightly., Normal, Disp-90 Cap, R-3      cyanocobalamin (VITAMIN B12) 1,000 mcg/mL injection INJECT CONTENTS OF ONE VIAL INTRAMUSCULARLY EVERY OTHER WEEK, Normal, Disp-6 mL, R-6      alpha-D-galactosidase (BEANO PO) Take 1 Tab by mouth two (2) times a day., Historical Med      calcium carbonate (TUMS) 200 mg calcium (500 mg) chew Take 2 Tabs by mouth three (3) times daily as needed (indigestion). , Historical Med      pantoprazole (PROTONIX) 40 mg tablet TAKE 1 BY MOUTH EVERY MORNING FOR 30 DAYS, Historical Med, R-5      cholecalciferol, vitamin D3, (VITAMIN D3 PO) Take 1 Tab by mouth daily. , Historical Med      cetirizine (ZYRTEC) 10 mg tablet Take 10 mg by mouth nightly., Historical Med       !! - Potential duplicate medications found. Please discuss with provider. 2.   Follow-up Information     Follow up With Specialties Details Why Contact Edwin Trujillo, DO Internal Medicine  As needed [id number] [facility]  [phone]          3. Return to ED if worse     Diagnosis     Clinical Impression:   1. Gastritis and duodenitis        Attestations:    Sejal Scott M.D.         Please note that this dictation was completed with [employer], the [employer] voice recognition software. Quite often unanticipated grammatical, syntax, homophones, and other interpretive errors are inadvertently transcribed by the computer software. Please disregard these errors. Please excuse any errors that have escaped final proofreading. Thank you.

## 2019-11-07 ENCOUNTER — APPOINTMENT (OUTPATIENT)
Dept: CT IMAGING | Age: 64
End: 2019-11-07
Attending: EMERGENCY MEDICINE
Payer: COMMERCIAL

## 2019-11-07 ENCOUNTER — HOSPITAL ENCOUNTER (EMERGENCY)
Age: 64
Discharge: HOME | End: 2019-11-07
Attending: EMERGENCY MEDICINE
Payer: COMMERCIAL

## 2019-11-07 ENCOUNTER — APPOINTMENT (OUTPATIENT)
Dept: ULTRASOUND IMAGING | Age: 64
End: 2019-11-07
Attending: EMERGENCY MEDICINE
Payer: COMMERCIAL

## 2019-11-07 VITALS
RESPIRATION RATE: 18 BRPM | TEMPERATURE: 97.9 F | DIASTOLIC BLOOD PRESSURE: 64 MMHG | BODY MASS INDEX: 26.73 KG/M2 | SYSTOLIC BLOOD PRESSURE: 101 MMHG | WEIGHT: 176.37 LBS | HEIGHT: 68 IN | HEART RATE: 80 BPM

## 2019-11-07 DIAGNOSIS — R19.00 RETROPERITONEAL MASS: ICD-10-CM

## 2019-11-07 DIAGNOSIS — R10.9 ACUTE ABDOMINAL PAIN: Primary | ICD-10-CM

## 2019-11-07 DIAGNOSIS — Z85.09 HISTORY OF CHOLANGIOCARCINOMA: ICD-10-CM

## 2019-11-07 DIAGNOSIS — R93.5 ABNORMAL CT OF THE ABDOMEN: ICD-10-CM

## 2019-11-07 DIAGNOSIS — K76.0 HEPATIC STEATOSIS: ICD-10-CM

## 2019-11-07 DIAGNOSIS — R11.0 NAUSEA WITHOUT VOMITING: ICD-10-CM

## 2019-11-07 LAB
ALBUMIN SERPL-MCNC: 4 G/DL (ref 3.5–5)
ALBUMIN/GLOB SERPL: 1.3 {RATIO} (ref 1.1–2.2)
ALP SERPL-CCNC: 134 U/L (ref 45–117)
ALT SERPL-CCNC: 43 U/L (ref 12–78)
ANION GAP SERPL CALC-SCNC: 3 MMOL/L (ref 5–15)
APPEARANCE UR: CLEAR
AST SERPL-CCNC: 22 U/L (ref 15–37)
BACTERIA URNS QL MICRO: NEGATIVE /HPF
BASOPHILS # BLD: 0 K/UL (ref 0–0.1)
BASOPHILS NFR BLD: 0 % (ref 0–1)
BILIRUB SERPL-MCNC: 1.9 MG/DL (ref 0.2–1)
BILIRUB UR QL: NEGATIVE
BUN SERPL-MCNC: 15 MG/DL (ref 6–20)
BUN/CREAT SERPL: 13 (ref 12–20)
CALCIUM SERPL-MCNC: 8.5 MG/DL (ref 8.5–10.1)
CHLORIDE SERPL-SCNC: 108 MMOL/L (ref 97–108)
CO2 SERPL-SCNC: 31 MMOL/L (ref 21–32)
COLOR UR: ABNORMAL
CREAT SERPL-MCNC: 1.2 MG/DL (ref 0.7–1.3)
DIFFERENTIAL METHOD BLD: ABNORMAL
EOSINOPHIL # BLD: 0.3 K/UL (ref 0–0.4)
EOSINOPHIL NFR BLD: 4 % (ref 0–7)
EPITH CASTS URNS QL MICRO: ABNORMAL /LPF
ERYTHROCYTE [DISTWIDTH] IN BLOOD BY AUTOMATED COUNT: 14.6 % (ref 11.5–14.5)
GLOBULIN SER CALC-MCNC: 3 G/DL (ref 2–4)
GLUCOSE SERPL-MCNC: 187 MG/DL (ref 65–100)
GLUCOSE UR STRIP.AUTO-MCNC: >1000 MG/DL
HCT VFR BLD AUTO: 50.8 % (ref 36.6–50.3)
HGB BLD-MCNC: 16.3 G/DL (ref 12.1–17)
HGB UR QL STRIP: ABNORMAL
HYALINE CASTS URNS QL MICRO: ABNORMAL /LPF (ref 0–5)
IMM GRANULOCYTES # BLD AUTO: 0 K/UL (ref 0–0.04)
IMM GRANULOCYTES NFR BLD AUTO: 0 % (ref 0–0.5)
KETONES UR QL STRIP.AUTO: NEGATIVE MG/DL
LEUKOCYTE ESTERASE UR QL STRIP.AUTO: NEGATIVE
LIPASE SERPL-CCNC: 71 U/L (ref 73–393)
LYMPHOCYTES # BLD: 0.7 K/UL (ref 0.8–3.5)
LYMPHOCYTES NFR BLD: 9 % (ref 12–49)
MCH RBC QN AUTO: 29.3 PG (ref 26–34)
MCHC RBC AUTO-ENTMCNC: 32.1 G/DL (ref 30–36.5)
MCV RBC AUTO: 91.2 FL (ref 80–99)
MONOCYTES # BLD: 0.6 K/UL (ref 0–1)
MONOCYTES NFR BLD: 8 % (ref 5–13)
NEUTS SEG # BLD: 6.2 K/UL (ref 1.8–8)
NEUTS SEG NFR BLD: 79 % (ref 32–75)
NITRITE UR QL STRIP.AUTO: NEGATIVE
NRBC # BLD: 0 K/UL (ref 0–0.01)
NRBC BLD-RTO: 0 PER 100 WBC
PH UR STRIP: 6 [PH] (ref 5–8)
PLATELET # BLD AUTO: 155 K/UL (ref 150–400)
PMV BLD AUTO: 10.5 FL (ref 8.9–12.9)
POTASSIUM SERPL-SCNC: 3.9 MMOL/L (ref 3.5–5.1)
PROT SERPL-MCNC: 7 G/DL (ref 6.4–8.2)
PROT UR STRIP-MCNC: NEGATIVE MG/DL
RBC # BLD AUTO: 5.57 M/UL (ref 4.1–5.7)
RBC #/AREA URNS HPF: ABNORMAL /HPF (ref 0–5)
RBC MORPH BLD: ABNORMAL
SODIUM SERPL-SCNC: 142 MMOL/L (ref 136–145)
SP GR UR REFRACTOMETRY: 1.01 (ref 1–1.03)
UROBILINOGEN UR QL STRIP.AUTO: 0.2 EU/DL (ref 0.2–1)
WBC # BLD AUTO: 7.8 K/UL (ref 4.1–11.1)
WBC URNS QL MICRO: ABNORMAL /HPF (ref 0–4)

## 2019-11-07 PROCEDURE — 83690 ASSAY OF LIPASE: CPT

## 2019-11-07 PROCEDURE — 74011250636 HC RX REV CODE- 250/636: Performed by: EMERGENCY MEDICINE

## 2019-11-07 PROCEDURE — 80053 COMPREHEN METABOLIC PANEL: CPT

## 2019-11-07 PROCEDURE — 96375 TX/PRO/DX INJ NEW DRUG ADDON: CPT

## 2019-11-07 PROCEDURE — 74011636320 HC RX REV CODE- 636/320: Performed by: EMERGENCY MEDICINE

## 2019-11-07 PROCEDURE — 76705 ECHO EXAM OF ABDOMEN: CPT

## 2019-11-07 PROCEDURE — 96374 THER/PROPH/DIAG INJ IV PUSH: CPT

## 2019-11-07 PROCEDURE — 99283 EMERGENCY DEPT VISIT LOW MDM: CPT

## 2019-11-07 PROCEDURE — 81001 URINALYSIS AUTO W/SCOPE: CPT

## 2019-11-07 PROCEDURE — 85025 COMPLETE CBC W/AUTO DIFF WBC: CPT

## 2019-11-07 PROCEDURE — 74177 CT ABD & PELVIS W/CONTRAST: CPT

## 2019-11-07 PROCEDURE — 96361 HYDRATE IV INFUSION ADD-ON: CPT

## 2019-11-07 PROCEDURE — 74011000250 HC RX REV CODE- 250: Performed by: EMERGENCY MEDICINE

## 2019-11-07 PROCEDURE — 36415 COLL VENOUS BLD VENIPUNCTURE: CPT

## 2019-11-07 RX ORDER — TRAMADOL HYDROCHLORIDE 50 MG/1
50 TABLET ORAL
Qty: 10 TAB | Refills: 0 | Status: SHIPPED | OUTPATIENT
Start: 2019-11-07 | End: 2019-11-12

## 2019-11-07 RX ORDER — FENTANYL CITRATE 50 UG/ML
50 INJECTION, SOLUTION INTRAMUSCULAR; INTRAVENOUS
Status: COMPLETED | OUTPATIENT
Start: 2019-11-07 | End: 2019-11-07

## 2019-11-07 RX ORDER — ONDANSETRON 2 MG/ML
4 INJECTION INTRAMUSCULAR; INTRAVENOUS
Status: COMPLETED | OUTPATIENT
Start: 2019-11-07 | End: 2019-11-07

## 2019-11-07 RX ORDER — SODIUM CHLORIDE 0.9 % (FLUSH) 0.9 %
10 SYRINGE (ML) INJECTION
Status: COMPLETED | OUTPATIENT
Start: 2019-11-07 | End: 2019-11-07

## 2019-11-07 RX ORDER — ONDANSETRON 4 MG/1
4 TABLET, ORALLY DISINTEGRATING ORAL
Qty: 20 TAB | Refills: 0 | Status: SHIPPED | OUTPATIENT
Start: 2019-11-07 | End: 2020-02-24 | Stop reason: SDUPTHER

## 2019-11-07 RX ORDER — DICYCLOMINE HYDROCHLORIDE 20 MG/1
20 TABLET ORAL EVERY 6 HOURS
Qty: 20 TAB | Refills: 0 | Status: SHIPPED | OUTPATIENT
Start: 2019-11-07 | End: 2019-11-12

## 2019-11-07 RX ADMIN — FAMOTIDINE 20 MG: 10 INJECTION, SOLUTION INTRAVENOUS at 11:49

## 2019-11-07 RX ADMIN — FENTANYL CITRATE 50 MCG: 50 INJECTION, SOLUTION INTRAMUSCULAR; INTRAVENOUS at 11:49

## 2019-11-07 RX ADMIN — ONDANSETRON 4 MG: 2 INJECTION INTRAMUSCULAR; INTRAVENOUS at 11:49

## 2019-11-07 RX ADMIN — Medication 10 ML: at 13:00

## 2019-11-07 RX ADMIN — IOPAMIDOL 100 ML: 755 INJECTION, SOLUTION INTRAVENOUS at 13:00

## 2019-11-07 RX ADMIN — SODIUM CHLORIDE 500 ML: 900 INJECTION, SOLUTION INTRAVENOUS at 11:53

## 2019-11-07 NOTE — ED PROVIDER NOTES
EMERGENCY DEPARTMENT HISTORY AND PHYSICAL EXAM      Please note that this dictation was completed with [id number], the computer voice recognition software. Quite often unanticipated grammatical, syntax, homophones, and other interpretive errors are inadvertently transcribed by the computer software. Please disregard these errors and any errors that have escaped final proofreading. Thank you. Date: 11/7/2019  Patient Name: Hyun Antunez  Patient Age and Sex: 62 y.o. male    History of Presenting Illness     Chief Complaint   Patient presents with    Abdominal Pain     mid/upper pain that started last night    Vomiting     last episode this AM    Diarrhea     2 episodes today       History Provided By: Patient    HPI: Hyun Antunez, 62 y.o. male with past medical history as documented below presents to the ED with c/o of two days of acute upper abdominal pain with associated vomiting and diarrhea. Pt reports recently being seen for gastritis and prescribed prilosec. Pt states his \"burning type\" pain resolved but now having crampy, dull upper abdominal pain. Pt reports several episodes of emesis, nonbloody and nonbilious. He also reported having loose stool today. He reports having his gallbladder removed during a Whipple procedure for cholangiocarcinoma. He underwent chemotherapy, last session 5/2018. He is followed by Dr. Sho Ayers. He reports taking OTC pain medications without much relief of pain. He describes the pain at 8/10 intensity, worse with movement and palpation. Pt denies any other alleviating or exacerbating factors. Additionally, pt specifically denies any recent fever, chills, headache, CP, SOB, lightheadedness, dizziness, numbness, weakness, BLE swelling, heart palpitations, urinary sxs, constipation, melena, hematochezia, cough, or congestion. There are no other complaints, changes or physical findings at this time.      PCP: Roro Lewis DO    Past History   Past Medical History:  Past Medical History:   Diagnosis Date    Arrhythmia     Previous a.fib, ablation, NSR now; NO LONGER FOLLOWED BY CARDIOLOGIST.  Autoimmune disease ([facility] [address]     SJOGREN'S    Cancer ([facility] [address] [facility]     COMMON BILE DUCT, ADENOCARCINOMA    Family history of skin cancer     Hypertension     Ill-defined condition     pre-diabetic - diet controlled    Sepsis ([facility] [address] 2017    STENTING TO BILE DUCT    Status post chemotherapy     Stroke [facility]) 2008    brain aneurysm - no deficits    Sun-damaged skin     Sunburn, blistering        Past Surgical History:  Past Surgical History:   Procedure Laterality Date    ABDOMEN SURGERY PROC UNLISTED  10/2017    Whipple     HX GI      COLONOSCOPY, POLYPS (BENIGN)    HX GI  10/06/2017    Viktor Gallo [facility]     HX HEART CATHETERIZATION  2005    Ablation     HX ORTHOPAEDIC  2000    Spinal fusion W/ HARDWARE    HX OTHER SURGICAL  11/07/2017    [place] Cath, Dr. Devin Du  2017    PORTACATH - then removed    NEUROLOGICAL PROCEDURE UNLISTED  2005    Giovani Horde hole washout from cerebral hemorrhage       Family History:  Family History   Problem Relation Age of Onset    Cancer Father         Breast and Colon    Cancer Mother         LEUKEMIA    No Known Problems Sister     No Known Problems Brother     No Known Problems Sister     Anesth Problems Neg Hx        Social History:  Social History     Tobacco Use    Smoking status: Never Smoker    Smokeless tobacco: Never Used   Substance Use Topics    Alcohol use: Yes     Comment: very rare    Drug use: No       Allergies: Allergies   Allergen Reactions    Shellfish Derived Anaphylaxis     Soft shell crabs    Pcn [Penicillins] Other (comments)     Pt stated \"always been told PCN\"       Current Medications:  No current facility-administered medications on file prior to encounter.       Current Outpatient Medications on File Prior to Encounter   Medication Sig Dispense Refill    omeprazole (PRILOSEC) 40 mg capsule Take 1 Cap by mouth daily. 30 Cap 0    metFORMIN ER (GLUCOPHAGE XR) 500 mg tablet TAKE 1 TABLET BY MOUTH TWICE A  Tab 3    metFORMIN ER (GLUCOPHAGE XR) 500 mg tablet Take 1 Tab by mouth two (2) times a day. 60 Tab 11    ondansetron (ZOFRAN ODT) 8 mg disintegrating tablet DISSOLVE 1 TAB BY MOUTH EVERY 8 HOURS AS NEEDED FOR NAUSEA.  0    ONETOUCH ULTRA2 METER misc USE AS DIRECTED  0    ONETOUCH DELICA LANCETS 30 gauge misc USE AS DIRECTED  3    Blood-Glucose Meter monitoring kit 1 preferred brand glucometer for checking home glucose 1 Kit 0    lancets misc Use preferred brand; Check glucose 1 time daily, Diagnosis E11.65 100 Each 3    glucose blood VI test strips (PHARMACIST CHOICE) strip Use preferred brand; Check glucose 1 times daily, Diagnosis E11.65 100 Strip 3    empagliflozin (JARDIANCE) 25 mg tablet Take 1 Tab by mouth daily. 90 Tab 3    gabapentin (NEURONTIN) 300 mg capsule TAKE 3 CAPS BY MOUTH TWO (2) TIMES A DAY. 540 Cap 3    aspirin delayed-release 81 mg tablet Take 81 mg by mouth.  famotidine (PEPCID) 20 mg tablet Take 20 mg by mouth.  sildenafil citrate (VIAGRA) 50 mg tablet Take 1 Tab by mouth as needed (ED). 30 Tab 1    promethazine (PHENERGAN) 25 mg tablet Take 1 Tab by mouth every six (6) hours as needed for Nausea. 30 Tab 1    tamsulosin (FLOMAX) 0.4 mg capsule TAKE ONE CAPSULE BY MOUTH EVERY EVENING  0    ramipril (ALTACE) 10 mg capsule Take 1 Cap by mouth nightly. 90 Cap 3    cyanocobalamin (VITAMIN B12) 1,000 mcg/mL injection INJECT CONTENTS OF ONE VIAL INTRAMUSCULARLY EVERY OTHER WEEK 6 mL 6    alpha-D-galactosidase (BEANO PO) Take 1 Tab by mouth two (2) times a day.  calcium carbonate (TUMS) 200 mg calcium (500 mg) chew Take 2 Tabs by mouth three (3) times daily as needed (indigestion).  pantoprazole (PROTONIX) 40 mg tablet TAKE 1 BY MOUTH EVERY MORNING FOR 30 DAYS  5    cholecalciferol, vitamin D3, (VITAMIN D3 PO) Take 1 Tab by mouth daily.  cetirizine (ZYRTEC) 10 mg tablet Take 10 mg by mouth nightly. Review of Systems   Review of Systems   Constitutional: Negative. Negative for chills and fever. HENT: Negative. Negative for congestion, facial swelling, rhinorrhea, sore throat, trouble swallowing and voice change. Eyes: Negative. Respiratory: Negative. Negative for apnea, cough, chest tightness, shortness of breath and wheezing. Cardiovascular: Negative. Negative for chest pain, palpitations and leg swelling. Gastrointestinal: Positive for abdominal pain, diarrhea, nausea and vomiting. Negative for abdominal distention, blood in stool and constipation. Endocrine: Negative. Negative for cold intolerance, heat intolerance and polyuria. Genitourinary: Negative. Negative for difficulty urinating, dysuria, flank pain, frequency, hematuria and urgency. Musculoskeletal: Negative. Negative for arthralgias, back pain, myalgias, neck pain and neck stiffness. Skin: Negative. Negative for color change and rash. Neurological: Negative. Negative for dizziness, syncope, facial asymmetry, speech difficulty, weakness, light-headedness, numbness and headaches. Hematological: Negative. Does not bruise/bleed easily. Psychiatric/Behavioral: Negative. Negative for confusion and self-injury. The patient is not nervous/anxious. Physical Exam   Physical Exam   Constitutional: He is oriented to person, place, and time. Vital signs are normal. He appears well-developed and well-nourished. He is cooperative. Non-toxic appearance. HENT:   Head: Normocephalic and atraumatic. Mouth/Throat: Mucous membranes are normal. No posterior oropharyngeal erythema. Eyes: Pupils are equal, round, and reactive to light. Conjunctivae and EOM are normal.   Neck: Normal range of motion. Cardiovascular: Normal rate, regular rhythm, normal heart sounds and intact distal pulses. Exam reveals no gallop and no friction rub.    No murmur heard.  Pulmonary/Chest: Effort normal and breath sounds normal. No respiratory distress. He has no wheezes. He has no rales. He exhibits no tenderness. Abdominal: Soft. Bowel sounds are normal. He exhibits no distension and no mass. There is tenderness (min epigastric TTP, healed surgical scars, nonperitoneal exam). There is no rebound and no guarding. Musculoskeletal: Normal range of motion. He exhibits no edema, tenderness or deformity. Neurological: He is alert and oriented to person, place, and time. He displays normal reflexes. No cranial nerve deficit. He exhibits normal muscle tone. Coordination normal.   Skin: Skin is warm. No rash noted. Psychiatric: He has a normal mood and affect. Nursing note and vitals reviewed. Diagnostic Study Results     Labs -  Recent Results (from the past 24 hour(s))   CBC WITH AUTOMATED DIFF    Collection Time: 11/07/19 10:45 AM   Result Value Ref Range    WBC 7.8 4.1 - 11.1 K/uL    RBC 5.57 4.10 - 5.70 M/uL    HGB 16.3 12.1 - 17.0 g/dL    HCT 50.8 (H) 36.6 - 50.3 %    MCV 91.2 80.0 - 99.0 FL    MCH 29.3 26.0 - 34.0 PG    MCHC 32.1 30.0 - 36.5 g/dL    RDW 14.6 (H) 11.5 - 14.5 %    PLATELET 084 255 - 797 K/uL    MPV 10.5 8.9 - 12.9 FL    NRBC 0.0 0  WBC    ABSOLUTE NRBC 0.00 0.00 - 0.01 K/uL    NEUTROPHILS 79 (H) 32 - 75 %    LYMPHOCYTES 9 (L) 12 - 49 %    MONOCYTES 8 5 - 13 %    EOSINOPHILS 4 0 - 7 %    BASOPHILS 0 0 - 1 %    IMMATURE GRANULOCYTES 0 0.0 - 0.5 %    ABS. NEUTROPHILS 6.2 1.8 - 8.0 K/UL    ABS. LYMPHOCYTES 0.7 (L) 0.8 - 3.5 K/UL    ABS. MONOCYTES 0.6 0.0 - 1.0 K/UL    ABS. EOSINOPHILS 0.3 0.0 - 0.4 K/UL    ABS. BASOPHILS 0.0 0.0 - 0.1 K/UL    ABS. IMM.  GRANS. 0.0 0.00 - 0.04 K/UL    DF AUTOMATED      RBC COMMENTS NORMOCYTIC, NORMOCHROMIC     METABOLIC PANEL, COMPREHENSIVE    Collection Time: 11/07/19 10:45 AM   Result Value Ref Range    Sodium 142 136 - 145 mmol/L    Potassium 3.9 3.5 - 5.1 mmol/L    Chloride 108 97 - 108 mmol/L    CO2 31 21 - 32 mmol/L    Anion gap 3 (L) 5 - 15 mmol/L    Glucose 187 (H) 65 - 100 mg/dL    BUN 15 6 - 20 MG/DL    Creatinine 1.20 0.70 - 1.30 MG/DL    BUN/Creatinine ratio 13 12 - 20      GFR est AA >60 >60 ml/min/1.73m2    GFR est non-AA >60 >60 ml/min/1.73m2    Calcium 8.5 8.5 - 10.1 MG/DL    Bilirubin, total 1.9 (H) 0.2 - 1.0 MG/DL    ALT (SGPT) 43 12 - 78 U/L    AST (SGOT) 22 15 - 37 U/L    Alk. phosphatase 134 (H) 45 - 117 U/L    Protein, total 7.0 6.4 - 8.2 g/dL    Albumin 4.0 3.5 - 5.0 g/dL    Globulin 3.0 2.0 - 4.0 g/dL    A-G Ratio 1.3 1.1 - 2.2     LIPASE    Collection Time: 11/07/19 10:45 AM   Result Value Ref Range    Lipase 71 (L) 73 - 393 U/L   URINALYSIS W/ RFLX MICROSCOPIC    Collection Time: 11/07/19 11:13 AM   Result Value Ref Range    Color YELLOW/STRAW      Appearance CLEAR CLEAR      Specific gravity 1.015 1.003 - 1.030      pH (UA) 6.0 5.0 - 8.0      Protein NEGATIVE  NEG mg/dL    Glucose >1,000 (A) NEG mg/dL    Ketone NEGATIVE  NEG mg/dL    Bilirubin NEGATIVE  NEG      Blood TRACE (A) NEG      Urobilinogen 0.2 0.2 - 1.0 EU/dL    Nitrites NEGATIVE  NEG      Leukocyte Esterase NEGATIVE  NEG      WBC 0-4 0 - 4 /hpf    RBC 0-5 0 - 5 /hpf    Epithelial cells FEW FEW /lpf    Bacteria NEGATIVE  NEG /hpf    Hyaline cast 0-2 0 - 5 /lpf       Radiologic Studies -   CT ABD PELV W CONT   Final Result   IMPRESSION:   1. Increased retroperitoneal soft tissue, possible tumor recurrence. 2. Otherwise stable exam.      US ABD LTD   Final Result   Impression: Echogenic liver compatible with hepatic steatosis. Status post   Whipple. No acute abnormality is identified. CT Results  (Last 48 hours)               11/07/19 1258  CT ABD PELV W CONT Final result    Impression:  IMPRESSION:   1. Increased retroperitoneal soft tissue, possible tumor recurrence.    2. Otherwise stable exam.       Narrative:  INDICATION: acute abd pain, vomiting        EXAM: CT Abdomen and Pelvis is performed with 100 mL Isovue 370 contrast IV   without oral contrast. CT dose reduction was achieved through use of a   standardized protocol tailored for this examination and automatic exposure   control for dose modulation. COMPARISON: 8/21/2019, 2/21/2019. FINDINGS:    The patient is status post Whipple procedure. There is increased retroperitoneal   soft tissue adjacent to the aorta and origin of the celiac artery and SMA,   possible tumor recurrence. There is no significant adenopathy. There is no fluid collection. There is no   bowel obstruction. Bile and pancreatic ducts are not dilated. Pancreatic remnant   shows no inflammation. There is no ascites or pneumoperitoneum. Liver shows no focal lesion. Spleen is   unremarkable. Adrenal glands are normal in size. Kidneys show no mass or   hydronephrosis. Aorta is calcified without aneurysm. The appendix is normal. There are colonic diverticula. The bladder is   unremarkable. There is prostatomegaly. The distal ureters are not dilated. There   is no apparent pelvic mass. CXR Results  (Last 48 hours)    None          Medical Decision Making   I am the first provider for this patient. I reviewed the vital signs, available nursing notes, past medical history, past surgical history, family history and social history. Vital Signs-Reviewed the patient's vital signs.   Patient Vitals for the past 24 hrs:   Temp Pulse Resp BP   11/07/19 1159    101/64   11/07/19 1041 97.9 °F (36.6 °C) 80 18 118/70       Pulse Oximetry Analysis - 100% on RA    Cardiac Monitor:   Rate: 80 bpm  Rhythm: Normal Sinus Rhythm      Records Reviewed: Nursing Notes, Old Medical Records, Previous electrocardiograms, Previous Radiology Studies and Previous Laboratory Studies    Provider Notes (Medical Decision Making):   Pt presents with acute abdominal pain, vomiting, diarrhea; vital signs stable with currently a non-peritoneal exam; DDx includes: Gastroenteritis, hepatitis, pancreatitis, obstruction, appendicitis, viral illness, IBD, diverticulitis, mesenteric ischemia, AAA or descending dissection, ACS, kidney stone. Will get labs, treat symptomatically and obtain serial abdominal exams to determine if additional imaging is indicated. Will reassess and monitor closely. ED Course:   Initial assessment performed. The patients presenting problems have been discussed, and they are in agreement with the care plan formulated and outlined with them. I have encouraged them to ask questions as they arise throughout their visit. HYPERTENSION COUNSELING   Education was provided to the patient today regarding their hypertension. Patient is made aware of their elevated blood pressure and is instructed to follow up this week with their Primary Care for a recheck. Patient is counseled regarding consequences of chronic, uncontrolled hypertension including kidney disease, heart disease, stroke or even death. Patient states their understanding and agrees to follow up this week. Additionally, during their visit, I discussed sodium restriction, maintaining ideal body weight and regular exercise program as physiologic means to achieve blood pressure control. The patient will strive towards this. I reviewed our electronic medical record system for any past medical records that were available that may contribute to the patient's current condition, the nursing notes and vital signs from today's visit.   Cassidy Cowart MD    ED Orders Placed :  Orders Placed This Encounter    CT ABDOMEN PELVIS WITH CONTRAST    US ABD LTD    CBC WITH AUTOMATED DIFF    METABOLIC PANEL, COMPREHENSIVE    URINALYSIS W/ RFLX MICROSCOPIC    LIPASE    ondansetron (ZOFRAN) injection 4 mg    fentaNYL citrate (PF) injection 50 mcg    famotidine (PF) (PEPCID) 20 mg in sodium chloride 0.9% 10 mL injection    sodium chloride 0.9 % bolus infusion 500 mL    sodium chloride (NS) flush 10 mL    iopamidol (ISOVUE-370) 76 % injection 100 mL  ondansetron (ZOFRAN ODT) 4 mg disintegrating tablet    dicyclomine (BENTYL) 20 mg tablet    traMADol (ULTRAM) 50 mg tablet     ED Medications Administered:  Medications   ondansetron (ZOFRAN) injection 4 mg (4 mg IntraVENous Given 11/7/19 1149)   fentaNYL citrate (PF) injection 50 mcg (50 mcg IntraVENous Given 11/7/19 1149)   famotidine (PF) (PEPCID) 20 mg in sodium chloride 0.9% 10 mL injection (20 mg IntraVENous Given 11/7/19 1149)   sodium chloride 0.9 % bolus infusion 500 mL (0 mL IntraVENous IV Completed 11/7/19 1253)   sodium chloride (NS) flush 10 mL (10 mL IntraVENous Given 11/7/19 1300)   iopamidol (ISOVUE-370) 76 % injection 100 mL (100 mL IntraVENous Given 11/7/19 1300)         Progress Note:  Reviewed CT findings with patient, pt to f/u with PCP and Heme/Onc. Progress Note:  I have re-examined the patient. he feels much better and symptoms improved. Tolerating oral intake. Abdomen is soft and without guarding, rebound or other peritoneal signs. I have discussed with patient the importance of close f/u and to return to the ED if symptoms don't improve or worsen. Progress Note:  Patient has been reassessed and reports feeling better and symptoms have improved significantly after ED treatment. Patient feels comfortable going home with close follow-up. Matt Cristina's final labs and imaging have been reviewed with him and available family and/or caregiver. They have been counseled regarding his diagnosis. He verbally conveys understanding and agreement of the signs, symptoms, diagnosis, treatment and prognosis and additionally agrees to follow up as recommended with Dr. Barba Buerger, DO and/or specialist in 24 - 48 hours. He also agrees with the care-plan we created together and conveys that all of his questions have been answered.   I have also put together some discharge instructions for him that include: 1) educational information regarding their diagnosis, 2) how to care for their diagnosis at home, as well a 3) list of reasons why they would want to return to the ED prior to their follow-up appointment should the patient's condition change or symptoms worsen. I have answered all questions to the patient's satisfaction. Strict return precautions given. He both understood and agreed with plan as discussed. Vital signs stable for discharge. Disposition: DISCHARGE  The pt is ready for discharge. The pt's signs, symptoms, diagnosis, and discharge instructions have been discussed and pt has conveyed their understanding. The pt is to follow up as recommended or return to ER should their symptoms worsen. Plan has been discussed and pt is in agreement. PLAN:  1. Return precautions as discussed. 2.   Discharge Medication List as of 11/7/2019  2:00 PM      START taking these medications    Details   !! ondansetron (ZOFRAN ODT) 4 mg disintegrating tablet Take 1 Tab by mouth every eight (8) hours as needed for Nausea. , Print, Disp-20 Tab, R-0      dicyclomine (BENTYL) 20 mg tablet Take 1 Tab by mouth every six (6) hours for 20 doses. , Print, Disp-20 Tab, R-0      traMADol (ULTRAM) 50 mg tablet Take 1 Tab by mouth every six (6) hours as needed for Pain (severe pain) for up to 5 days. Max Daily Amount: 200 mg. Indications: pain, Print, Disp-10 Tab, R-0       !! - Potential duplicate medications found. Please discuss with provider. CONTINUE these medications which have NOT CHANGED    Details   omeprazole (PRILOSEC) 40 mg capsule Take 1 Cap by mouth daily. , Normal, Disp-30 Cap, R-0      !! metFORMIN ER (GLUCOPHAGE XR) 500 mg tablet TAKE 1 TABLET BY MOUTH TWICE A DAY, Normal, Disp-180 Tab, R-3      !! metFORMIN ER (GLUCOPHAGE XR) 500 mg tablet Take 1 Tab by mouth two (2) times a day., Normal, Disp-60 Tab, R-11      !! ondansetron (ZOFRAN ODT) 8 mg disintegrating tablet DISSOLVE 1 TAB BY MOUTH EVERY 8 HOURS AS NEEDED FOR NAUSEA., Historical Med, R-0      ONETOUCH ULTRA2 METER misc USE AS DIRECTED, Historical Med, R-0, PREETI      !! ONETOUCH DELICA LANCETS 30 gauge misc USE AS DIRECTED, Historical Med, R-3, PREETI      Blood-Glucose Meter monitoring kit 1 preferred brand glucometer for checking home glucose, Normal, Disp-1 Kit, R-0      !! lancets misc Use preferred brand; Check glucose 1 time daily, Diagnosis E11.65, Normal, Disp-100 Each, R-3      glucose blood VI test strips (PHARMACIST CHOICE) strip Use preferred brand; Check glucose 1 times daily, Diagnosis E11.65, Normal, Disp-100 Strip, R-3      empagliflozin (JARDIANCE) 25 mg tablet Take 1 Tab by mouth daily. , Normal, Disp-90 Tab, R-3      gabapentin (NEURONTIN) 300 mg capsule TAKE 3 CAPS BY MOUTH TWO (2) TIMES A DAY., Normal, Disp-540 Cap, R-3      aspirin delayed-release 81 mg tablet Take 81 mg by mouth., Historical Med      famotidine (PEPCID) 20 mg tablet Take 20 mg by mouth., Historical Med      sildenafil citrate (VIAGRA) 50 mg tablet Take 1 Tab by mouth as needed (ED)., Normal, Disp-30 Tab, R-1      promethazine (PHENERGAN) 25 mg tablet Take 1 Tab by mouth every six (6) hours as needed for Nausea., Normal, Disp-30 Tab, R-1      tamsulosin (FLOMAX) 0.4 mg capsule TAKE ONE CAPSULE BY MOUTH EVERY EVENING, Historical Med, R-0      ramipril (ALTACE) 10 mg capsule Take 1 Cap by mouth nightly., Normal, Disp-90 Cap, R-3      cyanocobalamin (VITAMIN B12) 1,000 mcg/mL injection INJECT CONTENTS OF ONE VIAL INTRAMUSCULARLY EVERY OTHER WEEK, Normal, Disp-6 mL, R-6      alpha-D-galactosidase (BEANO PO) Take 1 Tab by mouth two (2) times a day., Historical Med      calcium carbonate (TUMS) 200 mg calcium (500 mg) chew Take 2 Tabs by mouth three (3) times daily as needed (indigestion). , Historical Med      pantoprazole (PROTONIX) 40 mg tablet TAKE 1 BY MOUTH EVERY MORNING FOR 30 DAYS, Historical Med, R-5      cholecalciferol, vitamin D3, (VITAMIN D3 PO) Take 1 Tab by mouth daily. , Historical Med      cetirizine (ZYRTEC) 10 mg tablet Take 10 mg by mouth nightly., Historical Med       !! - Potential duplicate medications found. Please discuss with provider. 3.   Follow-up Information     Follow up With Specialties Details Why Contact Info    Dinora Cox DO Internal Medicine   [address] [facility]  [phone]      [facility] EMERGENCY DEPT Emergency Medicine  As needed, If symptoms worsen [address] [place] [address]    Geoff Melendez MD Hematology and Oncology, Internal Medicine, Hematology, Oncology Schedule an appointment as soon as possible for a visit in 1 day  [address] [place] [address]  [phone]            Return to ED if worse  Diagnosis     Clinical Impression:   1. Acute abdominal pain    2. Nausea without vomiting    3. Hepatic steatosis    4. Abnormal CT of the abdomen    5. Retroperitoneal mass    6. History of cholangiocarcinoma        Attestation:  I personally performed the services described in this documentation on this date 11/7/2019 for patient, Juice Herring. Hubert Mckeon MD      This note will not be viewable in [address]

## 2019-11-07 NOTE — DISCHARGE INSTRUCTIONS
Thank you for allowing us to take care of you today! We hope we addressed all of your concerns and needs. We strive to provide excellent quality care in the Emergency Department. You will receive a survey after your visit to evaluate the care you were provided. Should you receive a survey from us, we invite you to share your experience and tell us what made it excellent. It was a pleasure serving you, we invite you to share your experience with us, in our pursuit for excellence, should you be selected to receive a survey. The exam and treatment you received in the Emergency Department were for an urgent problem and are not intended as complete care. It is important that you follow up with a doctor, nurse practitioner, or physician assistant for ongoing care. If your symptoms become worse or you do not improve as expected and you are unable to reach your usual health care provider, you should return to the Emergency Department. We are available 24 hours a day. Please take your discharge instructions with you when you go to your follow-up appointment. If you have any problem arranging a follow-up appointment, contact the Emergency Department immediately. If a prescription has been provided, please have it filled as soon as possible to prevent a delay in treatment. Read the entire medication instruction sheet provided to you by the pharmacy. If you have any questions or reservations about taking the medication due to side effects or interactions with other medications, please call your primary care physician or contact the ER to speak with the charge nurse. Make an appointment with your family doctor or the physician you were referred to for follow-up of this visit as instructed on your discharge paperwork, as this is mandatory follow-up. Return to the ER if you are unable to be seen or if you are unable to be seen in a timely manner.     If you have any problem arranging the follow-up visit, contact the Emergency Department immediately. I hope you feel better and thank you again for allow us to provide you with excellent care today at [facility]! Warmest regards,    Viraj Christnesen MD  Emergency Medicine Physician  [facility]        _____________________________________________________________________________________________________________    Vitals:    11/07/19 1041 11/07/19 1159   BP: 118/70 101/64   BP 1 Location: Left arm    BP Patient Position: At rest    Pulse: 80    Resp: 18    Temp: 97.9 °F (36.6 °C)    Weight: 80 kg (176 lb 5.9 oz)    Height: 5' 8\" (1.727 m)        Recent Results (from the past 12 hour(s))   CBC WITH AUTOMATED DIFF    Collection Time: 11/07/19 10:45 AM   Result Value Ref Range    WBC 7.8 4.1 - 11.1 K/uL    RBC 5.57 4.10 - 5.70 M/uL    HGB 16.3 12.1 - 17.0 g/dL    HCT 50.8 (H) 36.6 - 50.3 %    MCV 91.2 80.0 - 99.0 FL    MCH 29.3 26.0 - 34.0 PG    MCHC 32.1 30.0 - 36.5 g/dL    RDW 14.6 (H) 11.5 - 14.5 %    PLATELET 919 808 - 475 K/uL    MPV 10.5 8.9 - 12.9 FL    NRBC 0.0 0  WBC    ABSOLUTE NRBC 0.00 0.00 - 0.01 K/uL    NEUTROPHILS 79 (H) 32 - 75 %    LYMPHOCYTES 9 (L) 12 - 49 %    MONOCYTES 8 5 - 13 %    EOSINOPHILS 4 0 - 7 %    BASOPHILS 0 0 - 1 %    IMMATURE GRANULOCYTES 0 0.0 - 0.5 %    ABS. NEUTROPHILS 6.2 1.8 - 8.0 K/UL    ABS. LYMPHOCYTES 0.7 (L) 0.8 - 3.5 K/UL    ABS. MONOCYTES 0.6 0.0 - 1.0 K/UL    ABS. EOSINOPHILS 0.3 0.0 - 0.4 K/UL    ABS. BASOPHILS 0.0 0.0 - 0.1 K/UL    ABS. IMM.  GRANS. 0.0 0.00 - 0.04 K/UL    DF AUTOMATED      RBC COMMENTS NORMOCYTIC, NORMOCHROMIC     METABOLIC PANEL, COMPREHENSIVE    Collection Time: 11/07/19 10:45 AM   Result Value Ref Range    Sodium 142 136 - 145 mmol/L    Potassium 3.9 3.5 - 5.1 mmol/L    Chloride 108 97 - 108 mmol/L    CO2 31 21 - 32 mmol/L    Anion gap 3 (L) 5 - 15 mmol/L    Glucose 187 (H) 65 - 100 mg/dL    BUN 15 6 - 20 MG/DL    Creatinine 1. 20 0.70 - 1.30 MG/DL    BUN/Creatinine ratio 13 12 - 20      GFR est AA >60 >60 ml/min/1.73m2    GFR est non-AA >60 >60 ml/min/1.73m2    Calcium 8.5 8.5 - 10.1 MG/DL    Bilirubin, total 1.9 (H) 0.2 - 1.0 MG/DL    ALT (SGPT) 43 12 - 78 U/L    AST (SGOT) 22 15 - 37 U/L    Alk. phosphatase 134 (H) 45 - 117 U/L    Protein, total 7.0 6.4 - 8.2 g/dL    Albumin 4.0 3.5 - 5.0 g/dL    Globulin 3.0 2.0 - 4.0 g/dL    A-G Ratio 1.3 1.1 - 2.2     LIPASE    Collection Time: 11/07/19 10:45 AM   Result Value Ref Range    Lipase 71 (L) 73 - 393 U/L   URINALYSIS W/ RFLX MICROSCOPIC    Collection Time: 11/07/19 11:13 AM   Result Value Ref Range    Color YELLOW/STRAW      Appearance CLEAR CLEAR      Specific gravity 1.015 1.003 - 1.030      pH (UA) 6.0 5.0 - 8.0      Protein NEGATIVE  NEG mg/dL    Glucose >1,000 (A) NEG mg/dL    Ketone NEGATIVE  NEG mg/dL    Bilirubin NEGATIVE  NEG      Blood TRACE (A) NEG      Urobilinogen 0.2 0.2 - 1.0 EU/dL    Nitrites NEGATIVE  NEG      Leukocyte Esterase NEGATIVE  NEG      WBC 0-4 0 - 4 /hpf    RBC 0-5 0 - 5 /hpf    Epithelial cells FEW FEW /lpf    Bacteria NEGATIVE  NEG /hpf    Hyaline cast 0-2 0 - 5 /lpf       CT ABD PELV W CONT   Final Result   IMPRESSION:   1. Increased retroperitoneal soft tissue, possible tumor recurrence. 2. Otherwise stable exam.      US ABD LTD   Final Result   Impression: Echogenic liver compatible with hepatic steatosis. Status post   Whipple. No acute abnormality is identified. CT Results  (Last 48 hours)               11/07/19 1258  CT ABD PELV W CONT Final result    Impression:  IMPRESSION:   1. Increased retroperitoneal soft tissue, possible tumor recurrence.    2. Otherwise stable exam.       Narrative:  INDICATION: acute abd pain, vomiting        EXAM: CT Abdomen and Pelvis is performed with 100 mL Isovue 370 contrast IV   without oral contrast. CT dose reduction was achieved through use of a   standardized protocol tailored for this examination and automatic exposure   control for dose modulation. COMPARISON: 8/21/2019, 2/21/2019. FINDINGS:    The patient is status post Whipple procedure. There is increased retroperitoneal   soft tissue adjacent to the aorta and origin of the celiac artery and SMA,   possible tumor recurrence. There is no significant adenopathy. There is no fluid collection. There is no   bowel obstruction. Bile and pancreatic ducts are not dilated. Pancreatic remnant   shows no inflammation. There is no ascites or pneumoperitoneum. Liver shows no focal lesion. Spleen is   unremarkable. Adrenal glands are normal in size. Kidneys show no mass or   hydronephrosis. Aorta is calcified without aneurysm. The appendix is normal. There are colonic diverticula. The bladder is   unremarkable. There is prostatomegaly. The distal ureters are not dilated. There   is no apparent pelvic mass.                  Local Primary Care Physicians   [facility] [phone]  Squire Fare, MD Quenten Memo, MD Sofia Habermann, MD [facility] [phone]  Carola Farias, [facility]  MD Flavio Emmanuel MD Charlyne Emery, MD [address] [address] [phone]  MD Shannan Dinero [place] [address] [facility] [phone]  MD Valentina Taveras MD Flynn Pimple, MD Lind Proud, MD   [facility] [phone]  QKJR MD Rebekah DAY MD  [place] [facility], NP [address] [facility] [address] [phone]  MD Alicia Joaquin MD Mark Pencil, MD Roxy Limerick, MD Ilda Schneiders, MD Alexa Fees, MD Purnell Maizes, MD   [employer] [place]  Geannie Kussmaul, MD [facility] [phone]  MD Walekr Gimenez, MD Jonathon Elliott MD Sydell Bangs, MD Cleve Negus, MD Christa Michaelis, MD   [facility] [phone]  Vasiliy Cassidy MD Rajat Nunez, FNP  Ariel Fischer, NP  Niko Simmons, MD Charlie Nguyen, MD Tim Patle, MD J[facility]YN [facility] [phone]  MD Shravan Lipscomb MD Harlow Henri, MD Martyn Sa, MD Valeen Baumann, MD   [facility] [phone]  MD Angie Baez MD [place] [phone]  MD Mary Ellen Escamilla, MD Erasto Rodrigez MD   [address] [facility] [phone]  Rodrigo Al, MD Kylah Spivey, MD Lynn Hernandez, MD Anirudh Connelly, MD Anival Hinson, MD Andres Alas, NP  Frederick Arvizu MD [facility]   [phone]  Andreina De La Torre, MD Katie Zuñiga, MD Kirill Squires MD     [address] [facility] [phone]  Deandra Pryor, MD Sandra Childers, FNP  Randee Gaming, PA-C  Randee Gaming, FNP  Jerome Chirinos PA-C  MD Jefferson Tamayo, NP   Chris Dhillon,    Miscellaneous:  Charmaine Spring MD [facility] Departments   For adult and child immunizations, family planning, TB screening, STD testing and women's health services. [facility]: [place] [phone]     [address]: [address] [phone]     [address] [place] [address]        [address] [address]  For primary care services, woman and child wellness, and some clinics providing specialty care. [facility] [address] [facility] [phone]   [address] [place] [address] 200 [facility] 3618 [facility] [phone]   [address] [facility] [address] [facility] [address] [phone] [address] [employer] [address] [place] [address] [address] [place] [address]  [phone] [address] [facility]  [address] [address] [place] [phone]   Maria Eugenia Robertson [address] 81 [facility] [address] the [facility] [address], 809 [facility]   [facility]: [facility] Nancy [address], [address]     [place] [address] F[address] Juan [address] [phone]   [employer]  [address] ([email or web address])         Sexual Health/Woman Wellness Clinics   For STD/HIV testing and treatment, pregnancy testing and services, men's health, birth control services, LGBT services, and hepatitis/HPV vaccine services. [employer] [facility] [place] [employer] [address] [facility] [address] [facility] [address] [facility] 1579 [address] [facility] [phone]   [facility] [address] , 5th floor [phone]   Pregnancy [address] [facility] [address] [facility] [phone]        [place] [address] [facility]   [phone]   [place]   [phone]   Women, Infant and Children's Services: [address] [phone]       [facility] [address]    [phone]   [address] [facility]   [phone]   [address] [place] [address] Drive   12[address] [facility]       Patient Education        Abdominal Pain: Care Instructions  Your Care Instructions    Abdominal pain has many possible causes. Some aren't serious and get better on their own in a few days. Others need more testing and treatment. If your pain continues or gets worse, you need to be rechecked and may need more tests to find out what is wrong. You may need surgery to correct the problem. Don't ignore new symptoms, such as fever, nausea and vomiting, urination problems, pain that gets worse, and dizziness.  These may be signs of a more serious problem. Your doctor may have recommended a follow-up visit in the next 8 to 12 hours. If you are not getting better, you may need more tests or treatment. The doctor has checked you carefully, but problems can develop later. If you notice any problems or new symptoms, get medical treatment right away. Follow-up care is a key part of your treatment and safety. Be sure to make and go to all appointments, and call your doctor if you are having problems. It's also a good idea to know your test results and keep a list of the medicines you take. How can you care for yourself at home? · Rest until you feel better. · To prevent dehydration, drink plenty of fluids, enough so that your urine is light yellow or clear like water. Choose water and other caffeine-free clear liquids until you feel better. If you have kidney, heart, or liver disease and have to limit fluids, talk with your doctor before you increase the amount of fluids you drink. · If your stomach is upset, eat mild foods, such as rice, dry toast or crackers, bananas, and applesauce. Try eating several small meals instead of two or three large ones. · Wait until 48 hours after all symptoms have gone away before you have spicy foods, alcohol, and drinks that contain caffeine. · Do not eat foods that are high in fat. · Avoid anti-inflammatory medicines such as aspirin, ibuprofen (Advil, Motrin), and naproxen (Aleve). These can cause stomach upset. Talk to your doctor if you take daily aspirin for another health problem. When should you call for help? Call 911 anytime you think you may need emergency care.  For example, call if:    · You passed out (lost consciousness).     · You pass maroon or very bloody stools.     · You vomit blood or what looks like coffee grounds.     · You have new, severe belly pain.    Call your doctor now or seek immediate medical care if:    · Your pain gets worse, especially if it becomes focused in one area of your belly.     · You have a new or higher fever.     · Your stools are black and look like tar, or they have streaks of blood.     · You have unexpected vaginal bleeding.     · You have symptoms of a urinary tract infection. These may include:  ? Pain when you urinate. ? Urinating more often than usual.  ? Blood in your urine.     · You are dizzy or lightheaded, or you feel like you may faint.    Watch closely for changes in your health, and be sure to contact your doctor if:    · You are not getting better after 1 day (24 hours). Where can you learn more? Go to http://sandra-jenelle.info/. Enter Q677 in the search box to learn more about \"Abdominal Pain: Care Instructions. \"  Current as of: June 26, 2019  Content Version: 12.2  © 8110-3157 Healthwise, Incorporated. Care instructions adapted under license by [employer] (which disclaims liability or warranty for this information). If you have questions about a medical condition or this instruction, always ask your healthcare professional. [address] any warranty or liability for your use of this information.

## 2019-11-07 NOTE — ED NOTES
Discharge instructions reviewed with patient, copy given by this RN. Pt is accomponied by family, denies use of wheelchair.

## 2019-11-08 ENCOUNTER — HOSPITAL ENCOUNTER (EMERGENCY)
Age: 64
Discharge: STILL ADMITTED OR ADMINISTRATIVE | End: 2019-11-08
Attending: EMERGENCY MEDICINE
Payer: COMMERCIAL

## 2019-11-08 ENCOUNTER — PATIENT OUTREACH (OUTPATIENT)
Dept: OTHER | Age: 64
End: 2019-11-08

## 2019-11-08 PROCEDURE — 75810000275 HC EMERGENCY DEPT VISIT NO LEVEL OF CARE

## 2019-11-08 NOTE — PROGRESS NOTES
CM Outreach      Patient well known to CM with support for oncology/ pancreatic cancer support with whipple procedure and oral chemo. Found with 2 ED visits 11/5 and 11/7 for abd pain with N/V/D. * Current ED note for today 11/8 is noted to be opened in error. * PCP apt 11/18    No return call. Call next week for CM needs. Chart Review:       Acute epigastric pain, vomiting onset for 2 days  H/o whipples, had gallbladder removed     Pt denies any other alleviating or exacerbating factors. Additionally, pt specifically denies any recent fever, chills, headache, nausea, vomiting, abdominal pain, CP, SOB, lightheadedness, dizziness, numbness, weakness, BLE swelling, heart palpitations, urinary sxs, diarrhea, constipation, melena, hematochezia, cough, or congestion.      CT  IMPRESSION:  1. Increased retroperitoneal soft tissue, possible tumor recurrence.   2. Otherwise stable exam.    Discharge Orders   Hide   (From 11/07/19 1036 through 11/07/19 1403)   Start Ordered   Status Ordering User   11/07/19 0000 11/07/19 1359  ondansetron (ZOFRAN ODT) 4 mg disintegrating tablet EVERY 8 HOURS AS NEEDED    Discontinue    Ordered MAURICIO SALAZAR   11/07/19 0000 11/07/19 1359  dicyclomine (BENTYL) 20 mg tablet EVERY 6 HOURS    Discontinue    Ordered MAURICIO SALAZAR   11/07/19 0000 11/07/19 1400  traMADol (ULTRAM) 50 mg tablet EVERY 6 HOURS AS NEEDED    Discontinue    Ordered Yandel Card

## 2019-11-11 ENCOUNTER — PATIENT OUTREACH (OUTPATIENT)
Dept: OTHER | Age: 64
End: 2019-11-11

## 2019-11-11 NOTE — PROGRESS NOTES
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 Found with 2 ED visits 11/5 and 11/7 for abd pain with N/V/D.    * ED note for today 11/8 is noted to be opened in error. * PCP apt 11/18  Patient eligible for [address] care management    Two patient identifiers verified. Patient previously enrolled in the care management program.  Patient agrees to care management services at this time. Patient's primary care provider relationship reviewed with patient and modified, as applicable. Patient has significant diagnosis of cholangiocarcinoma and DM. Care management assessment completed:    Patient stated problem:   * Burning sensation. \"like you are stabbing pain like a hot poker and I never know when it will hit me. It takes me to my knees when it does! \"     - Started a month ago. - No weight loss ; Infrequent nausea. * He is hoping that pain is due to scar tissue, and not recurrence of cancer. Care manager identified primary concern / concern for possible return of cancer/  And symptom control of pain. Emotional Status/Adjustment to Health State:  Positive outlook. \"I'm hoping it's nothing but scar tissue. \"      Readiness to Change: []  Pre-contemplative    []  Contemplative  []  Preparation               [x]  Action                  []  Maintenance    Barriers/Challenges to Care: []  Decline in memory    []  Language barrier     []  Emotional                  []  Limited mobility  []  Lack of motivation     [] Vision, hearing or cognitive impairment []  Knowledge [] Financial Barriers  []  Other     Patient stated goal:  It would be good to have your support while we figure this out. Care Manager/Provider identified medical goal :  Assistance in pain control/ support during medical work up for causal factors. Support System/Resources:  Wife is a RN / teaches at [employer] nursing [employer]. Supportive grown children; Nondenominational family. Advance Care Planning:  Sent previously. ADLS/DME: BS testing. Referrals:  None      Upcoming appointments:   Marlena Granados with GI Dr. Brittany Montiel   Date Time Provider Sloane Roach   11/18/2019  8:45 AM Richardtrudy, [address]   2/3/2020  8:30 AM Nicolás Nathan MD RDE WENDI [address] [facility] [address]   2/27/2020  9:00 AM [facility]   2/28/2020  9:15 AM Ximena Fitzpatrick MD [address]   7/27/2020 11:00 AM Margie Chavez  [address] for Chronic Disease:    1. Diagnosis 1- Abd pain / questionable source ; History of Whipple for cholangiocarcinoma of biliary tract   2. Diagnosis 2- DM stable on oral medication. 3. Focused Assessment-   Gastrointestinal Condition Focused Assessment    Skin- any open wounds, incisions or appliance no  Description of wound-   New or worsening pain? yes  If yes, pain rated 0-10: 9 Location/pain characteristics: [place] in [address] New or worsening numbness or tingling? no  Activity level- moving several times a day, or as recommended? yes  Abnormal activity level reported: F[place]/  Walks a lot. Nutrition- prescribed diet? yes   Diet prescribed or recommended:  DM- controlled sugar intake/ more veggies. Difficulty swallowing no  Last weight of 176 lbs   Last BM yesterday abnormal consistency or amount yes  Abnormal labs no     In the last 24 hour have you experienced; Fever no  Low body temperature no  Chills or shaking no  Sweating no  Fast heart rate no  Fast breathing no  Dizziness/lightheadedness no  Confusion or unusual change in mental status no  Diarrhea no  Nausea no   Vomiting no  Shortness of breath or difficulty breathing no  Less urine output no  Cold, clammy, and pale skin no  Skin rash or skin color changes no      4.  Key pt activities to achieve better health:   []  Weight loss  []  Improved diabetic control  []  Decreased cholesterol levels  []  Decreased blood pressure  [x]  Accurate DX/      Patient verbalized understanding of all information discussed. Pt has my contact information for any further questions, concerns, or needs. Plan for next call: One week 11/18        Chart Review:    Result Notes for DIABETES PATIENT EDUCATION     Notes recorded by Yannick Ryan MD on 6/11/2019 at 1:38 PM EDT  C-peptide level looks healthy at 3.6 (1.1-4.4) with serum glucose of 149 at the time,   Pancreatic autoantibodies are negative,  Findings suggest that trial with jardiance with the metformin is quite reasonable,  I called to notify patient and his wife,   Reports his AM sugars have come down from the 200's to the 140's so far,    Abhijit Herrera.  Chantale Kaufman MD  [address] [facility] & Endocrinology     Labs:  A1C   6.4 9/3/2019 ;  6.6 10/30/2018

## 2019-11-13 ENCOUNTER — ANESTHESIA EVENT (OUTPATIENT)
Dept: ENDOSCOPY | Age: 64
End: 2019-11-13
Payer: COMMERCIAL

## 2019-11-13 ENCOUNTER — HOSPITAL ENCOUNTER (OUTPATIENT)
Dept: PREADMISSION TESTING | Age: 64
Discharge: HOME | End: 2019-11-13
Payer: COMMERCIAL

## 2019-11-13 PROCEDURE — 93005 ELECTROCARDIOGRAM TRACING: CPT

## 2019-11-13 NOTE — PERIOP NOTES
Dr Hieu Heredia reviewed preliminary EKG from today and had a comparison on 10/13/17. Okay to proceed with procedure tomorrow.

## 2019-11-14 ENCOUNTER — HOSPITAL ENCOUNTER (OUTPATIENT)
Age: 64
Setting detail: OUTPATIENT SURGERY
Discharge: HOME | End: 2019-11-14
Attending: INTERNAL MEDICINE | Admitting: INTERNAL MEDICINE
Payer: COMMERCIAL

## 2019-11-14 ENCOUNTER — ANESTHESIA (OUTPATIENT)
Dept: ENDOSCOPY | Age: 64
End: 2019-11-14
Payer: COMMERCIAL

## 2019-11-14 VITALS
BODY MASS INDEX: 26.23 KG/M2 | HEIGHT: 68 IN | DIASTOLIC BLOOD PRESSURE: 84 MMHG | HEART RATE: 66 BPM | OXYGEN SATURATION: 98 % | TEMPERATURE: 97.6 F | RESPIRATION RATE: 11 BRPM | WEIGHT: 173.06 LBS | SYSTOLIC BLOOD PRESSURE: 129 MMHG

## 2019-11-14 LAB
ATRIAL RATE: 73 BPM
CALCULATED P AXIS, ECG09: 88 DEGREES
CALCULATED R AXIS, ECG10: 5 DEGREES
CALCULATED T AXIS, ECG11: 18 DEGREES
DIAGNOSIS, 93000: NORMAL
P-R INTERVAL, ECG05: 156 MS
Q-T INTERVAL, ECG07: 390 MS
QRS DURATION, ECG06: 78 MS
QTC CALCULATION (BEZET), ECG08: 429 MS
VENTRICULAR RATE, ECG03: 73 BPM

## 2019-11-14 PROCEDURE — 77030019957 HC CUF BLN GASTSCP OCOA -B: Performed by: INTERNAL MEDICINE

## 2019-11-14 PROCEDURE — 77030018846 HC SOL IRR STRL H20 ICUM -A: Performed by: INTERNAL MEDICINE

## 2019-11-14 PROCEDURE — 74011000250 HC RX REV CODE- 250: Performed by: REGISTERED NURSE

## 2019-11-14 PROCEDURE — 74011250636 HC RX REV CODE- 250/636: Performed by: REGISTERED NURSE

## 2019-11-14 PROCEDURE — 76060000031 HC ANESTHESIA FIRST 0.5 HR: Performed by: INTERNAL MEDICINE

## 2019-11-14 PROCEDURE — 77030003406 HC NDL ASPIR BIOP OCOA -C: Performed by: INTERNAL MEDICINE

## 2019-11-14 PROCEDURE — 76040000019: Performed by: INTERNAL MEDICINE

## 2019-11-14 PROCEDURE — 74011250636 HC RX REV CODE- 250/636: Performed by: INTERNAL MEDICINE

## 2019-11-14 RX ORDER — PROPOFOL 10 MG/ML
INJECTION, EMULSION INTRAVENOUS
Status: DISCONTINUED | OUTPATIENT
Start: 2019-11-14 | End: 2019-11-14 | Stop reason: HOSPADM

## 2019-11-14 RX ORDER — GLYCOPYRROLATE 0.2 MG/ML
INJECTION INTRAMUSCULAR; INTRAVENOUS AS NEEDED
Status: DISCONTINUED | OUTPATIENT
Start: 2019-11-14 | End: 2019-11-14 | Stop reason: HOSPADM

## 2019-11-14 RX ORDER — PROPOFOL 10 MG/ML
INJECTION, EMULSION INTRAVENOUS AS NEEDED
Status: DISCONTINUED | OUTPATIENT
Start: 2019-11-14 | End: 2019-11-14 | Stop reason: HOSPADM

## 2019-11-14 RX ORDER — MIDAZOLAM HYDROCHLORIDE 1 MG/ML
.25-5 INJECTION, SOLUTION INTRAMUSCULAR; INTRAVENOUS
Status: DISCONTINUED | OUTPATIENT
Start: 2019-11-14 | End: 2019-11-14 | Stop reason: HOSPADM

## 2019-11-14 RX ORDER — LIDOCAINE HYDROCHLORIDE 20 MG/ML
INJECTION, SOLUTION EPIDURAL; INFILTRATION; INTRACAUDAL; PERINEURAL AS NEEDED
Status: DISCONTINUED | OUTPATIENT
Start: 2019-11-14 | End: 2019-11-14 | Stop reason: HOSPADM

## 2019-11-14 RX ORDER — SODIUM CHLORIDE 0.9 % (FLUSH) 0.9 %
5-40 SYRINGE (ML) INJECTION EVERY 8 HOURS
Status: DISCONTINUED | OUTPATIENT
Start: 2019-11-14 | End: 2019-11-14 | Stop reason: HOSPADM

## 2019-11-14 RX ORDER — NALOXONE HYDROCHLORIDE 0.4 MG/ML
0.4 INJECTION, SOLUTION INTRAMUSCULAR; INTRAVENOUS; SUBCUTANEOUS
Status: DISCONTINUED | OUTPATIENT
Start: 2019-11-14 | End: 2019-11-14 | Stop reason: HOSPADM

## 2019-11-14 RX ORDER — SODIUM CHLORIDE 9 MG/ML
75 INJECTION, SOLUTION INTRAVENOUS CONTINUOUS
Status: DISCONTINUED | OUTPATIENT
Start: 2019-11-14 | End: 2019-11-14 | Stop reason: HOSPADM

## 2019-11-14 RX ORDER — EPINEPHRINE 0.1 MG/ML
1 INJECTION INTRACARDIAC; INTRAVENOUS
Status: DISCONTINUED | OUTPATIENT
Start: 2019-11-14 | End: 2019-11-14 | Stop reason: HOSPADM

## 2019-11-14 RX ORDER — FLUMAZENIL 0.1 MG/ML
0.2 INJECTION INTRAVENOUS
Status: DISCONTINUED | OUTPATIENT
Start: 2019-11-14 | End: 2019-11-14 | Stop reason: HOSPADM

## 2019-11-14 RX ORDER — SODIUM CHLORIDE 0.9 % (FLUSH) 0.9 %
5-40 SYRINGE (ML) INJECTION AS NEEDED
Status: DISCONTINUED | OUTPATIENT
Start: 2019-11-14 | End: 2019-11-14 | Stop reason: HOSPADM

## 2019-11-14 RX ORDER — ATROPINE SULFATE 0.1 MG/ML
0.5 INJECTION INTRAVENOUS
Status: DISCONTINUED | OUTPATIENT
Start: 2019-11-14 | End: 2019-11-14 | Stop reason: HOSPADM

## 2019-11-14 RX ORDER — DEXTROMETHORPHAN/PSEUDOEPHED 2.5-7.5/.8
1.2 DROPS ORAL
Status: DISCONTINUED | OUTPATIENT
Start: 2019-11-14 | End: 2019-11-14 | Stop reason: HOSPADM

## 2019-11-14 RX ADMIN — PROPOFOL 60 MG: 10 INJECTION, EMULSION INTRAVENOUS at 14:54

## 2019-11-14 RX ADMIN — GLYCOPYRROLATE 0.2 MG: 0.2 INJECTION, SOLUTION INTRAMUSCULAR; INTRAVENOUS at 14:51

## 2019-11-14 RX ADMIN — PROPOFOL 100 MCG/KG/MIN: 10 INJECTION, EMULSION INTRAVENOUS at 14:51

## 2019-11-14 RX ADMIN — SODIUM CHLORIDE 75 ML/HR: 900 INJECTION, SOLUTION INTRAVENOUS at 14:04

## 2019-11-14 RX ADMIN — PROPOFOL 80 MG: 10 INJECTION, EMULSION INTRAVENOUS at 14:51

## 2019-11-14 RX ADMIN — LIDOCAINE HYDROCHLORIDE 100 MG: 20 INJECTION, SOLUTION EPIDURAL; INFILTRATION; INTRACAUDAL; PERINEURAL at 14:51

## 2019-11-14 NOTE — DISCHARGE INSTRUCTIONS
[place] Office: [phone]    Jaiden Fernández  [id number]  1955    EGD/COLONOSCOPY DISCHARGE INSTRUCTIONS  Discomfort:  Sore throat- throat lozenges or warm salt water gargle  redness at IV site- apply warm compress to area; if redness or soreness persist- contact your physician  Gaseous discomfort- walking, belching will help relieve any discomfort  You may not operate a vehicle for 12 hours  You may not engage in an occupation involving machinery or appliances for rest of today. You may not drink alcoholic beverages for at least 12 hours  Avoid making any critical decisions for at least 24 hour  DIET  You may resume your regular diet - however -  remember your colon is empty and a heavy meal will produce gas. Avoid these foods:  fried / greasy foods, excessive carbonated drinks or too much caffeine  MEDICATIONS   Regarding Aspirin or Nonsteroidal medications specifically, please see below. ACTIVITY  You may resume your normal daily activities. Spend the remainder of the day resting -  avoid any strenuous activity. CALL M.D. ANY SIGN OF   Increasing pain, nausea, vomiting  Abdominal distension (swelling)  New increased bleeding (oral or rectal)  Fever (chills)  Pain in chest area  Bloody discharge from nose or mouth  Shortness of breath    You may take any Advil, Aspirin, Ibuprofen, Motrin, Aleve, or  Tylenol as needed for pain. Follow-up Instructions:   Call  Jose Luis Astudillo MD for any questions or concerns   Telephone # [phone]      Follow-up Information    None

## 2019-11-14 NOTE — PROGRESS NOTES
Endoscope was pre-cleaned at the bedside immediately following procedure by SESAR    Anesthesia reports 289mg Propofol, 100mg Lidocaine and 850mL NS given during procedure. Received report from anesthesia staff on vital signs and status of patient.

## 2019-11-14 NOTE — ANESTHESIA PREPROCEDURE EVALUATION
Anesthetic History     PONV          Review of Systems / Medical History  Patient summary reviewed, nursing notes reviewed and pertinent labs reviewed    Pulmonary  Within defined limits                 Neuro/Psych       CVA: no residual symptoms  TIA    Comments: Cerebral aneurysm in 2008 Cardiovascular    Hypertension        Dysrhythmias : atrial fibrillation      Exercise tolerance: >4 METS  Comments: S/p ablation   GI/Hepatic/Renal  Within defined limits              Endo/Other    Diabetes: well controlled, type 2    Cancer     Other Findings   Comments:    Hx cholangiocarcinoma--sp whipple  Pancreatitis  Pancreatic duct stricture  Sjogren's disease           Physical Exam    Airway  Mallampati: I  TM Distance: > 6 cm  Neck ROM: normal range of motion   Mouth opening: Normal     Cardiovascular  Regular rate and rhythm,  S1 and S2 normal,  no murmur, click, rub, or gallop             Dental  No notable dental hx       Pulmonary  Breath sounds clear to auscultation               Abdominal  GI exam deferred       Other Findings            Anesthetic Plan    ASA: 3  Anesthesia type: total IV anesthesia          Induction: Intravenous  Anesthetic plan and risks discussed with: Patient

## 2019-11-14 NOTE — PROCEDURES
NAME:  Corazon Sanabria   :   1955   MRN:   [id number]     Heidi Burch [facility]    Date/Time:  2019   Procedure Type:  EUS   Indications: Abnormal CT scan showing retroperitoneal mass; hx of cholangiocarcinoma    Pre-operative Diagnosis: see indication above    Post-operative Diagnosis:  See findings below    : Norman Leonard MD    Referring Provider: Siva Gonzalez III, DO    Procedure Details:    Exam:  Airway: clear, no airway problems anticipated  Heart: RRR, without gallops or rubs  Lungs: clear bilaterally without wheezes, crackles, or rhonchi  Abdomen: soft, nontender, nondistended, bowel sounds present  Mental Status: awake, alert and oriented to person, place and time     Anethesia/Sedation:  MAC anesthesia Propofol      Procedure Details   After infom consent was obtained for the procedure, with all risks and benefits of procedure explained the patient was taken to the endoscopy suite and placed in the left lateral decubitus position. Following sequential administration of sedation as per above, the linear echoendoscope was inserted into the mouth and advanced under direct vision to proximal jejunum. A careful inspection was made as the gastroscope was withdrawn, including a retroflexed view of the proximal stomach; findings and interventions are described below. Findings:     Endoscopic:E: small hiatal hernia, G: moderate gastropathy w/ bile. Small benign antral nodule. J: normal    Ultrasound:    Using the EUS linear viewing scope/probe the mass lesion, as noted/reported on CT, is not visible through the stomach. Origin of celiac artery is easily seen though. Prominent left adrenal gland and normal appearing left kidney noted. Views from the jejunum are very limitted because of intervening loops of small bowel.        Esophagus: normal findings   Stomach: normal findings   Pancreas:     Areas examined: part of the  body, the tail (He has a hx of Whipple's surgery)    Parenchyma: -normal appearing    Pancreatic Duct: normal findings   Liver:     Parenchyma: normal                           Clip artifact in area of previous surgery noted. Specimen Removed: none    Complications: None. EBL:  None. Recommendations:     Case discussed with IR in person who will attempt a CT-guided biopsy of this lesion. Kelvin Cuenca MD

## 2019-11-14 NOTE — ROUTINE PROCESS
Katt Lopez 
1955 
[id number] Situation: 
Verbal report received from: EUFEMIA Teran RN Procedure: Procedure(s): 
EUS (UPPER) WITH PATH Background: 
 
Preoperative diagnosis: ACUTE PANCREATITIS PANCREATIC DUCT STRICTURE Postoperative diagnosis: EUS: hiatal hernia, gastritis, gastric nodule, abnormal CT :  Dr. Henderson Erp 
Assistant(s): Endoscopy Technician-1: Lolis Kowalski Endoscopy RN-1: Jb Teran RN Specimens: * No specimens in log * H. Pylori  no Assessment: 
Intra-procedure medications Anesthesia gave intra-procedure sedation and medications, see anesthesia flow sheet Intravenous fluids: NS@ [facility] Vital signs stable Abdominal assessment: round and soft Recommendation: 
Discharge patient per MD order. Family or Friend Permission to share finding with family or friend yes

## 2019-11-14 NOTE — H&P
Pre-endoscopy H and P    The patient was seen and examined in the room/pre-op holding area. The airway was assessed and documented. The problem list, past medical history, and medications were reviewed.      Patient Active Problem List   Diagnosis Code    Obstructive jaundice K83.1    Common bile duct (CBD) obstruction K83.1    UTI (urinary tract infection) N39.0    Cholangiocarcinoma of biliary tract (HCC) C24.9    Cholangiocarcinoma determined by biopsy of biliary tract (HCC) C24.9    Paroxysmal atrial fibrillation (HCC) I48.0    S/P ablation of atrial fibrillation Z98.890, Z86.79    Essential hypertension I10    Sjogren's disease ([facility] [address] M35.00    Cramps, muscle, general R25.2    Low vitamin D level R79.89    Low vitamin B12 level E53.8    Vomiting R11.10    Controlled type 2 diabetes mellitus without complication, without long-term current use of insulin (HCC) E11.9    Acute pancreatitis K85.90    Leukocytosis D72.829     Social History     Socioeconomic History    Marital status:      Spouse name: Not on file    Number of children: Not on file    Years of education: Not on file    Highest education level: Not on file   Occupational History    Not on file   Social Needs    Financial resource strain: Not on file    Food insecurity:     Worry: Not on file     Inability: Not on file    Transportation needs:     Medical: Not on file     Non-medical: Not on file   Tobacco Use    Smoking status: Never Smoker    Smokeless tobacco: Never Used   Substance and Sexual Activity    Alcohol use: Yes     Comment: very rare    Drug use: No    Sexual activity: Yes     Partners: Female   Lifestyle    Physical activity:     Days per week: Not on file     Minutes per session: Not on file    Stress: Not on file   Relationships    Social connections:     Talks on phone: Not on file     Gets together: Not on file     Attends Jew service: Not on file     Active member of club or organization: Not on file     Attends meetings of clubs or organizations: Not on file     Relationship status: Not on file    Intimate partner violence:     Fear of current or ex partner: Not on file     Emotionally abused: Not on file     Physically abused: Not on file     Forced sexual activity: Not on file   Other Topics Concern    Not on file   Social History Narrative    Not on file     Past Medical History:   Diagnosis Date    Arrhythmia     Previous a.fib, ablation, NSR now; NO LONGER FOLLOWED BY CARDIOLOGIST.  Autoimmune disease ([facility] [address]     SJOGREN'S    Cancer ([facility] [address]     COMMON BILE DUCT, ADENOCARCINOMA    Diabetes ([facility] [address]     Family history of skin cancer     Hypertension     Sepsis ([facility] [address] 2017    STENTING TO BILE DUCT    Status post chemotherapy     Stroke [facility]) 2008    brain aneurysm - no deficits    Sun-damaged skin     Sunburn, blistering          Prior to Admission Medications   Prescriptions Last Dose Informant Patient Reported? Taking? Blood-Glucose Meter monitoring kit 10/14/2019 at Unknown time  No Yes   Si preferred brand glucometer for checking home glucose   ONETOUCH DELICA LANCETS 30 gauge [facility] 10/14/2019 at Unknown time  Yes Yes   Sig: USE AS DIRECTED   ONETOUCH ULTRA2 METER [facility] 10/14/2019 at Unknown time  Yes Yes   Sig: USE AS DIRECTED   alpha-D-galactosidase (BEANO PO) 2019 at Unknown time Self Yes Yes   Sig: Take 1 Tab by mouth two (2) times a day. aspirin delayed-release 81 mg tablet 2019 at Unknown time  Yes Yes   Sig: Take 81 mg by mouth daily. calcium carbonate (TUMS) 200 mg calcium (500 mg) chew 2019 at Unknown time Self Yes Yes   Sig: Take 2 Tabs by mouth three (3) times daily as needed (indigestion). cetirizine (ZYRTEC) 10 mg tablet 2019 at Unknown time Self Yes Yes   Sig: Take 10 mg by mouth nightly. cholecalciferol, vitamin D3, (VITAMIN D3 PO) 2019 at Unknown time Self Yes Yes   Sig: Take 1 Tab by mouth daily.    cyanocobalamin (VITAMIN B12) 1,000 mcg/mL injection 10/14/2019 at Unknown time  No Yes   Sig: INJECT CONTENTS OF ONE VIAL INTRAMUSCULARLY EVERY OTHER WEEK   empagliflozin (JARDIANCE) 25 mg tablet 11/13/2019 at Unknown time  No Yes   Sig: Take 1 Tab by mouth daily. famotidine (PEPCID) 20 mg tablet 11/13/2019 at Unknown time  Yes Yes   Sig: Take 20 mg by mouth daily. gabapentin (NEURONTIN) 300 mg capsule 11/7/2019 at Unknown time  No Yes   Sig: TAKE 3 CAPS BY MOUTH TWO (2) TIMES A DAY. glucose blood VI test strips (PHARMACIST CHOICE) strip 10/14/2019 at Unknown time  No Yes   Sig: Use preferred brand; Check glucose 1 times daily, Diagnosis E11.65   lancets misc 10/14/2019 at Unknown time  No Yes   Sig: Use preferred brand; Check glucose 1 time daily, Diagnosis E11.65   metFORMIN ER (GLUCOPHAGE XR) 500 mg tablet 10/30/2019 at Unknown time  No Yes   Sig: TAKE 1 TABLET BY MOUTH TWICE A DAY   metFORMIN ER (GLUCOPHAGE XR) 500 mg tablet 11/6/2019 at Unknown time  No Yes   Sig: Take 1 Tab by mouth two (2) times a day. omeprazole (PRILOSEC) 40 mg capsule 11/12/2019  No No   Sig: Take 1 Cap by mouth daily. ondansetron (ZOFRAN ODT) 4 mg disintegrating tablet 11/14/2019 at Unknown time  No Yes   Sig: Take 1 Tab by mouth every eight (8) hours as needed for Nausea. ondansetron (ZOFRAN ODT) 8 mg disintegrating tablet   Yes No   Sig: DISSOLVE 1 TAB BY MOUTH EVERY 8 HOURS AS NEEDED FOR NAUSEA.   pantoprazole (PROTONIX) 40 mg tablet Unknown at Unknown time Self Yes No   Sig: TAKE 1 BY MOUTH EVERY MORNING FOR 30 DAYS   promethazine (PHENERGAN) 25 mg tablet Not Taking at Unknown time  No No   Sig: Take 1 Tab by mouth every six (6) hours as needed for Nausea. ramipril (ALTACE) 10 mg capsule 11/13/2019 at Unknown time  No Yes   Sig: Take 1 Cap by mouth nightly.    sildenafil citrate (VIAGRA) 50 mg tablet   No No   Sig: Take 1 Tab by mouth as needed (ED).   tamsulosin (FLOMAX) 0.4 mg capsule 11/13/2019 at Unknown time  Yes Yes   Sig: TAKE ONE CAPSULE BY MOUTH EVERY EVENING      Facility-Administered Medications: None           The review of systems is:  Negative  for shortness of breath or chest pain      The heart, lungs, and mental status were satisfactory for the administration of deep sedation and for the procedure. I discussed with the patient the objectives, risks, consequences and alternatives to the procedure.       Shaheen Andre MD  11/14/2019  2:45 PM

## 2019-11-15 NOTE — ANESTHESIA POSTPROCEDURE EVALUATION
Procedure(s):  EUS (UPPER) WITH PATH.    total IV anesthesia, general    Anesthesia Post Evaluation        Patient location during evaluation: PACU  Note status: Adequate. Level of consciousness: responsive to verbal stimuli and sleepy but conscious  Pain management: satisfactory to patient  Airway patency: patent  Anesthetic complications: no  Cardiovascular status: acceptable  Respiratory status: acceptable  Hydration status: acceptable  Comments: +Post-Anesthesia Evaluation and Assessment    Patient: Bev Martin MRN: [id number]  SSN: [id number]   YOB: 1955  Age: 62 y.o. Sex: male      Cardiovascular Function/Vital Signs    /84   Pulse 66   Temp 36.4 °C (97.6 °F)   Resp 11   Ht 5' 8\" (1.727 m)   Wt 78.5 kg (173 lb 1 oz)   SpO2 98%   BMI 26.31 kg/m²     Patient is status post Procedure(s):  EUS (UPPER) WITH PATH. Nausea/Vomiting: Controlled. Postoperative hydration reviewed and adequate. Pain:  Pain Scale 1: Numeric (0 - 10) (11/14/19 1540)  Pain Intensity 1: 0 (11/14/19 1540)   Managed. Neurological Status: At baseline. Mental Status and Level of Consciousness: Arousable. Pulmonary Status:   O2 Device: Room air (11/14/19 1535)   Adequate oxygenation and airway patent. Complications related to anesthesia: None    Post-anesthesia assessment completed. No concerns. Signed By: Michelle Lopez DO    11/15/2019  Post anesthesia nausea and vomiting:  controlled      Vitals Value Taken Time   /84 11/14/2019  3:40 PM   Temp 36.4 °C (97.6 °F) 11/14/2019  3:19 PM   Pulse 0 11/14/2019  3:42 PM   Resp 0 11/14/2019  3:42 PM   SpO2 0 % 11/14/2019  3:42 PM   Vitals shown include unvalidated device data.

## 2019-11-17 ENCOUNTER — HOSPITAL ENCOUNTER (INPATIENT)
Age: 64
LOS: 3 days | Discharge: HOME | DRG: 440 | End: 2019-11-20
Attending: STUDENT IN AN ORGANIZED HEALTH CARE EDUCATION/TRAINING PROGRAM | Admitting: HOSPITALIST
Payer: COMMERCIAL

## 2019-11-17 ENCOUNTER — APPOINTMENT (OUTPATIENT)
Dept: CT IMAGING | Age: 64
DRG: 440 | End: 2019-11-17
Attending: STUDENT IN AN ORGANIZED HEALTH CARE EDUCATION/TRAINING PROGRAM
Payer: COMMERCIAL

## 2019-11-17 DIAGNOSIS — K85.90 ACUTE PANCREATITIS WITHOUT INFECTION OR NECROSIS, UNSPECIFIED PANCREATITIS TYPE: Primary | ICD-10-CM

## 2019-11-17 DIAGNOSIS — C24.9 CHOLANGIOCARCINOMA DETERMINED BY BIOPSY OF BILIARY TRACT (HCC): ICD-10-CM

## 2019-11-17 LAB
ALBUMIN SERPL-MCNC: 4.2 G/DL (ref 3.5–5)
ALBUMIN/GLOB SERPL: 1.5 {RATIO} (ref 1.1–2.2)
ALP SERPL-CCNC: 127 U/L (ref 45–117)
ALT SERPL-CCNC: 39 U/L (ref 12–78)
ANION GAP SERPL CALC-SCNC: 6 MMOL/L (ref 5–15)
AST SERPL-CCNC: 18 U/L (ref 15–37)
BASOPHILS # BLD: 0 K/UL (ref 0–0.1)
BASOPHILS NFR BLD: 0 % (ref 0–1)
BILIRUB SERPL-MCNC: 1.1 MG/DL (ref 0.2–1)
BUN SERPL-MCNC: 22 MG/DL (ref 6–20)
BUN/CREAT SERPL: 25 (ref 12–20)
CALCIUM SERPL-MCNC: 9.2 MG/DL (ref 8.5–10.1)
CHLORIDE SERPL-SCNC: 105 MMOL/L (ref 97–108)
CO2 SERPL-SCNC: 26 MMOL/L (ref 21–32)
COMMENT, HOLDF: NORMAL
CREAT SERPL-MCNC: 0.87 MG/DL (ref 0.7–1.3)
DIFFERENTIAL METHOD BLD: ABNORMAL
EOSINOPHIL # BLD: 0.2 K/UL (ref 0–0.4)
EOSINOPHIL NFR BLD: 1 % (ref 0–7)
ERYTHROCYTE [DISTWIDTH] IN BLOOD BY AUTOMATED COUNT: 14.5 % (ref 11.5–14.5)
GLOBULIN SER CALC-MCNC: 2.8 G/DL (ref 2–4)
GLUCOSE BLD STRIP.AUTO-MCNC: 115 MG/DL (ref 65–100)
GLUCOSE SERPL-MCNC: 129 MG/DL (ref 65–100)
HCT VFR BLD AUTO: 51.8 % (ref 36.6–50.3)
HGB BLD-MCNC: 16.8 G/DL (ref 12.1–17)
IMM GRANULOCYTES # BLD AUTO: 0.2 K/UL (ref 0–0.04)
IMM GRANULOCYTES NFR BLD AUTO: 1 % (ref 0–0.5)
LIPASE SERPL-CCNC: >3000 U/L (ref 73–393)
LYMPHOCYTES # BLD: 0.6 K/UL (ref 0.8–3.5)
LYMPHOCYTES NFR BLD: 4 % (ref 12–49)
MCH RBC QN AUTO: 29.3 PG (ref 26–34)
MCHC RBC AUTO-ENTMCNC: 32.4 G/DL (ref 30–36.5)
MCV RBC AUTO: 90.2 FL (ref 80–99)
MONOCYTES # BLD: 1.1 K/UL (ref 0–1)
MONOCYTES NFR BLD: 7 % (ref 5–13)
NEUTS SEG # BLD: 13.4 K/UL (ref 1.8–8)
NEUTS SEG NFR BLD: 87 % (ref 32–75)
NRBC # BLD: 0 K/UL (ref 0–0.01)
NRBC BLD-RTO: 0 PER 100 WBC
PLATELET # BLD AUTO: 161 K/UL (ref 150–400)
PMV BLD AUTO: 11 FL (ref 8.9–12.9)
POTASSIUM SERPL-SCNC: 3.7 MMOL/L (ref 3.5–5.1)
PROT SERPL-MCNC: 7 G/DL (ref 6.4–8.2)
RBC # BLD AUTO: 5.74 M/UL (ref 4.1–5.7)
RBC MORPH BLD: ABNORMAL
SAMPLES BEING HELD,HOLD: NORMAL
SERVICE CMNT-IMP: ABNORMAL
SODIUM SERPL-SCNC: 137 MMOL/L (ref 136–145)
WBC # BLD AUTO: 15.5 K/UL (ref 4.1–11.1)

## 2019-11-17 PROCEDURE — 65270000029 HC RM PRIVATE

## 2019-11-17 PROCEDURE — 99284 EMERGENCY DEPT VISIT MOD MDM: CPT

## 2019-11-17 PROCEDURE — 74177 CT ABD & PELVIS W/CONTRAST: CPT

## 2019-11-17 PROCEDURE — 74011250636 HC RX REV CODE- 250/636: Performed by: HOSPITALIST

## 2019-11-17 PROCEDURE — 96361 HYDRATE IV INFUSION ADD-ON: CPT

## 2019-11-17 PROCEDURE — 74011636320 HC RX REV CODE- 636/320: Performed by: RADIOLOGY

## 2019-11-17 PROCEDURE — 80053 COMPREHEN METABOLIC PANEL: CPT

## 2019-11-17 PROCEDURE — 74011000258 HC RX REV CODE- 258: Performed by: RADIOLOGY

## 2019-11-17 PROCEDURE — 96374 THER/PROPH/DIAG INJ IV PUSH: CPT

## 2019-11-17 PROCEDURE — 74011250636 HC RX REV CODE- 250/636: Performed by: STUDENT IN AN ORGANIZED HEALTH CARE EDUCATION/TRAINING PROGRAM

## 2019-11-17 PROCEDURE — 96375 TX/PRO/DX INJ NEW DRUG ADDON: CPT

## 2019-11-17 PROCEDURE — 85025 COMPLETE CBC W/AUTO DIFF WBC: CPT

## 2019-11-17 PROCEDURE — 82962 GLUCOSE BLOOD TEST: CPT

## 2019-11-17 PROCEDURE — 36415 COLL VENOUS BLD VENIPUNCTURE: CPT

## 2019-11-17 PROCEDURE — 74011250637 HC RX REV CODE- 250/637: Performed by: HOSPITALIST

## 2019-11-17 PROCEDURE — 74011000250 HC RX REV CODE- 250: Performed by: HOSPITALIST

## 2019-11-17 PROCEDURE — 83690 ASSAY OF LIPASE: CPT

## 2019-11-17 RX ORDER — ONDANSETRON 2 MG/ML
4 INJECTION INTRAMUSCULAR; INTRAVENOUS
Status: DISCONTINUED | OUTPATIENT
Start: 2019-11-17 | End: 2019-11-20 | Stop reason: HOSPADM

## 2019-11-17 RX ORDER — SODIUM CHLORIDE 0.9 % (FLUSH) 0.9 %
5-40 SYRINGE (ML) INJECTION AS NEEDED
Status: DISCONTINUED | OUTPATIENT
Start: 2019-11-17 | End: 2019-11-20 | Stop reason: HOSPADM

## 2019-11-17 RX ORDER — HYDROMORPHONE HYDROCHLORIDE 1 MG/ML
1 INJECTION, SOLUTION INTRAMUSCULAR; INTRAVENOUS; SUBCUTANEOUS
Status: DISCONTINUED | OUTPATIENT
Start: 2019-11-17 | End: 2019-11-20 | Stop reason: HOSPADM

## 2019-11-17 RX ORDER — HYDROMORPHONE HYDROCHLORIDE 2 MG/ML
1 INJECTION, SOLUTION INTRAMUSCULAR; INTRAVENOUS; SUBCUTANEOUS ONCE
Status: COMPLETED | OUTPATIENT
Start: 2019-11-17 | End: 2019-11-17

## 2019-11-17 RX ORDER — TAMSULOSIN HYDROCHLORIDE 0.4 MG/1
0.4 CAPSULE ORAL DAILY
Status: DISCONTINUED | OUTPATIENT
Start: 2019-11-18 | End: 2019-11-20 | Stop reason: HOSPADM

## 2019-11-17 RX ORDER — LISINOPRIL 20 MG/1
40 TABLET ORAL
Status: DISCONTINUED | OUTPATIENT
Start: 2019-11-17 | End: 2019-11-20 | Stop reason: HOSPADM

## 2019-11-17 RX ORDER — ENOXAPARIN SODIUM 100 MG/ML
40 INJECTION SUBCUTANEOUS EVERY 24 HOURS
Status: DISCONTINUED | OUTPATIENT
Start: 2019-11-17 | End: 2019-11-20 | Stop reason: HOSPADM

## 2019-11-17 RX ORDER — SUCRALFATE 1 G/1
1 TABLET ORAL
Status: DISCONTINUED | OUTPATIENT
Start: 2019-11-17 | End: 2019-11-20 | Stop reason: HOSPADM

## 2019-11-17 RX ORDER — SODIUM CHLORIDE 0.9 % (FLUSH) 0.9 %
5-40 SYRINGE (ML) INJECTION EVERY 8 HOURS
Status: DISCONTINUED | OUTPATIENT
Start: 2019-11-17 | End: 2019-11-20 | Stop reason: HOSPADM

## 2019-11-17 RX ORDER — SODIUM CHLORIDE 9 MG/ML
125 INJECTION, SOLUTION INTRAVENOUS CONTINUOUS
Status: DISCONTINUED | OUTPATIENT
Start: 2019-11-17 | End: 2019-11-20

## 2019-11-17 RX ORDER — ASPIRIN 81 MG/1
81 TABLET ORAL DAILY
Status: DISCONTINUED | OUTPATIENT
Start: 2019-11-18 | End: 2019-11-20 | Stop reason: HOSPADM

## 2019-11-17 RX ORDER — SODIUM CHLORIDE 0.9 % (FLUSH) 0.9 %
10 SYRINGE (ML) INJECTION
Status: COMPLETED | OUTPATIENT
Start: 2019-11-17 | End: 2019-11-17

## 2019-11-17 RX ORDER — PROCHLORPERAZINE EDISYLATE 5 MG/ML
10 INJECTION INTRAMUSCULAR; INTRAVENOUS ONCE
Status: COMPLETED | OUTPATIENT
Start: 2019-11-17 | End: 2019-11-17

## 2019-11-17 RX ADMIN — Medication 10 ML: at 18:54

## 2019-11-17 RX ADMIN — SODIUM CHLORIDE 100 ML: 900 INJECTION, SOLUTION INTRAVENOUS at 18:54

## 2019-11-17 RX ADMIN — SODIUM CHLORIDE 1000 ML: 900 INJECTION, SOLUTION INTRAVENOUS at 17:50

## 2019-11-17 RX ADMIN — ONDANSETRON 4 MG: 2 INJECTION INTRAMUSCULAR; INTRAVENOUS at 22:38

## 2019-11-17 RX ADMIN — PROCHLORPERAZINE EDISYLATE 10 MG: 5 INJECTION INTRAMUSCULAR; INTRAVENOUS at 17:50

## 2019-11-17 RX ADMIN — HYDROMORPHONE HYDROCHLORIDE 1 MG: 1 INJECTION, SOLUTION INTRAMUSCULAR; INTRAVENOUS; SUBCUTANEOUS at 22:38

## 2019-11-17 RX ADMIN — LISINOPRIL 40 MG: 20 TABLET ORAL at 22:03

## 2019-11-17 RX ADMIN — Medication 10 ML: at 22:04

## 2019-11-17 RX ADMIN — SODIUM CHLORIDE 175 ML/HR: 900 INJECTION, SOLUTION INTRAVENOUS at 21:37

## 2019-11-17 RX ADMIN — SUCRALFATE 1 G: 1 TABLET ORAL at 22:03

## 2019-11-17 RX ADMIN — FAMOTIDINE 20 MG: 10 INJECTION, SOLUTION INTRAVENOUS at 22:03

## 2019-11-17 RX ADMIN — SODIUM CHLORIDE 1000 ML: 900 INJECTION, SOLUTION INTRAVENOUS at 18:36

## 2019-11-17 RX ADMIN — IOPAMIDOL 100 ML: 755 INJECTION, SOLUTION INTRAVENOUS at 18:54

## 2019-11-17 RX ADMIN — HYDROMORPHONE HYDROCHLORIDE 1 MG: 2 INJECTION, SOLUTION INTRAMUSCULAR; INTRAVENOUS; SUBCUTANEOUS at 17:50

## 2019-11-17 RX ADMIN — HYDROMORPHONE HYDROCHLORIDE 1 MG: 2 INJECTION, SOLUTION INTRAMUSCULAR; INTRAVENOUS; SUBCUTANEOUS at 19:34

## 2019-11-17 NOTE — ED TRIAGE NOTES
Pt ambulatory to ED accompanied by wife with c/o sudden onset abdominal pain at 2 pm with N/V.  Endoscopy done on 11/14/19 by Dr. Natasha Quiñones at [facility]

## 2019-11-18 ENCOUNTER — TELEPHONE (OUTPATIENT)
Dept: ONCOLOGY | Age: 64
End: 2019-11-18

## 2019-11-18 ENCOUNTER — PATIENT OUTREACH (OUTPATIENT)
Dept: OTHER | Age: 64
End: 2019-11-18

## 2019-11-18 LAB
ALBUMIN SERPL-MCNC: 3.2 G/DL (ref 3.5–5)
ALBUMIN/GLOB SERPL: 1.7 {RATIO} (ref 1.1–2.2)
ALP SERPL-CCNC: 101 U/L (ref 45–117)
ALT SERPL-CCNC: 30 U/L (ref 12–78)
ANION GAP SERPL CALC-SCNC: 6 MMOL/L (ref 5–15)
AST SERPL-CCNC: 10 U/L (ref 15–37)
BASOPHILS # BLD: 0 K/UL (ref 0–0.1)
BASOPHILS NFR BLD: 0 % (ref 0–1)
BILIRUB SERPL-MCNC: 1.5 MG/DL (ref 0.2–1)
BUN SERPL-MCNC: 16 MG/DL (ref 6–20)
BUN/CREAT SERPL: 25 (ref 12–20)
CALCIUM SERPL-MCNC: 8 MG/DL (ref 8.5–10.1)
CHLORIDE SERPL-SCNC: 107 MMOL/L (ref 97–108)
CO2 SERPL-SCNC: 25 MMOL/L (ref 21–32)
CREAT SERPL-MCNC: 0.63 MG/DL (ref 0.7–1.3)
DIFFERENTIAL METHOD BLD: ABNORMAL
EOSINOPHIL # BLD: 0.2 K/UL (ref 0–0.4)
EOSINOPHIL NFR BLD: 3 % (ref 0–7)
ERYTHROCYTE [DISTWIDTH] IN BLOOD BY AUTOMATED COUNT: 14.9 % (ref 11.5–14.5)
GLOBULIN SER CALC-MCNC: 1.9 G/DL (ref 2–4)
GLUCOSE BLD STRIP.AUTO-MCNC: 100 MG/DL (ref 65–100)
GLUCOSE BLD STRIP.AUTO-MCNC: 84 MG/DL (ref 65–100)
GLUCOSE BLD STRIP.AUTO-MCNC: 85 MG/DL (ref 65–100)
GLUCOSE SERPL-MCNC: 116 MG/DL (ref 65–100)
HCT VFR BLD AUTO: 42.9 % (ref 36.6–50.3)
HGB BLD-MCNC: 13.7 G/DL (ref 12.1–17)
IMM GRANULOCYTES # BLD AUTO: 0 K/UL (ref 0–0.04)
IMM GRANULOCYTES NFR BLD AUTO: 0 % (ref 0–0.5)
LIPASE SERPL-CCNC: 1069 U/L (ref 73–393)
LYMPHOCYTES # BLD: 0.9 K/UL (ref 0.8–3.5)
LYMPHOCYTES NFR BLD: 12 % (ref 12–49)
MCH RBC QN AUTO: 29.3 PG (ref 26–34)
MCHC RBC AUTO-ENTMCNC: 31.9 G/DL (ref 30–36.5)
MCV RBC AUTO: 91.7 FL (ref 80–99)
MONOCYTES # BLD: 0.7 K/UL (ref 0–1)
MONOCYTES NFR BLD: 9 % (ref 5–13)
NEUTS SEG # BLD: 5.8 K/UL (ref 1.8–8)
NEUTS SEG NFR BLD: 75 % (ref 32–75)
NRBC # BLD: 0 K/UL (ref 0–0.01)
NRBC BLD-RTO: 0 PER 100 WBC
PLATELET # BLD AUTO: 150 K/UL (ref 150–400)
PMV BLD AUTO: 10.8 FL (ref 8.9–12.9)
POTASSIUM SERPL-SCNC: 4 MMOL/L (ref 3.5–5.1)
PROT SERPL-MCNC: 5.1 G/DL (ref 6.4–8.2)
RBC # BLD AUTO: 4.68 M/UL (ref 4.1–5.7)
SERVICE CMNT-IMP: NORMAL
SODIUM SERPL-SCNC: 138 MMOL/L (ref 136–145)
WBC # BLD AUTO: 7.7 K/UL (ref 4.1–11.1)

## 2019-11-18 PROCEDURE — 36415 COLL VENOUS BLD VENIPUNCTURE: CPT

## 2019-11-18 PROCEDURE — 80053 COMPREHEN METABOLIC PANEL: CPT

## 2019-11-18 PROCEDURE — 65270000029 HC RM PRIVATE

## 2019-11-18 PROCEDURE — 74011000250 HC RX REV CODE- 250: Performed by: HOSPITALIST

## 2019-11-18 PROCEDURE — 82962 GLUCOSE BLOOD TEST: CPT

## 2019-11-18 PROCEDURE — 74011250637 HC RX REV CODE- 250/637: Performed by: HOSPITALIST

## 2019-11-18 PROCEDURE — 74011250636 HC RX REV CODE- 250/636: Performed by: HOSPITALIST

## 2019-11-18 PROCEDURE — 83690 ASSAY OF LIPASE: CPT

## 2019-11-18 PROCEDURE — 85025 COMPLETE CBC W/AUTO DIFF WBC: CPT

## 2019-11-18 RX ORDER — SUCRALFATE 1 G/1
1 TABLET ORAL 4 TIMES DAILY
COMMUNITY
End: 2020-10-26

## 2019-11-18 RX ADMIN — SUCRALFATE 1 G: 1 TABLET ORAL at 12:36

## 2019-11-18 RX ADMIN — ONDANSETRON 4 MG: 2 INJECTION INTRAMUSCULAR; INTRAVENOUS at 16:34

## 2019-11-18 RX ADMIN — Medication 10 ML: at 21:42

## 2019-11-18 RX ADMIN — SUCRALFATE 1 G: 1 TABLET ORAL at 16:39

## 2019-11-18 RX ADMIN — SUCRALFATE 1 G: 1 TABLET ORAL at 06:36

## 2019-11-18 RX ADMIN — HYDROMORPHONE HYDROCHLORIDE 1 MG: 1 INJECTION, SOLUTION INTRAMUSCULAR; INTRAVENOUS; SUBCUTANEOUS at 04:22

## 2019-11-18 RX ADMIN — HYDROMORPHONE HYDROCHLORIDE 1 MG: 1 INJECTION, SOLUTION INTRAMUSCULAR; INTRAVENOUS; SUBCUTANEOUS at 16:34

## 2019-11-18 RX ADMIN — HYDROMORPHONE HYDROCHLORIDE 1 MG: 1 INJECTION, SOLUTION INTRAMUSCULAR; INTRAVENOUS; SUBCUTANEOUS at 23:04

## 2019-11-18 RX ADMIN — FAMOTIDINE 20 MG: 10 INJECTION, SOLUTION INTRAVENOUS at 21:42

## 2019-11-18 RX ADMIN — SODIUM CHLORIDE 175 ML/HR: 900 INJECTION, SOLUTION INTRAVENOUS at 11:03

## 2019-11-18 RX ADMIN — FAMOTIDINE 20 MG: 10 INJECTION, SOLUTION INTRAVENOUS at 08:29

## 2019-11-18 RX ADMIN — SUCRALFATE 1 G: 1 TABLET ORAL at 21:42

## 2019-11-18 RX ADMIN — HYDROMORPHONE HYDROCHLORIDE 1 MG: 1 INJECTION, SOLUTION INTRAMUSCULAR; INTRAVENOUS; SUBCUTANEOUS at 10:26

## 2019-11-18 RX ADMIN — ONDANSETRON 4 MG: 2 INJECTION INTRAMUSCULAR; INTRAVENOUS at 04:22

## 2019-11-18 RX ADMIN — LISINOPRIL 40 MG: 20 TABLET ORAL at 21:43

## 2019-11-18 RX ADMIN — Medication 10 ML: at 16:39

## 2019-11-18 RX ADMIN — ONDANSETRON 4 MG: 2 INJECTION INTRAMUSCULAR; INTRAVENOUS at 23:05

## 2019-11-18 RX ADMIN — Medication 10 ML: at 06:36

## 2019-11-18 NOTE — CONSULTS
[facility]  at [address]  [place], [address] [facility]  [phone]       Follow-up Note      Patient: Mary Alice Turner MRN: [id number]  SSN: [id number]    YOB: 1955  Age: 62 y.o. Sex: male      Diagnosis:     1. 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 Pancreatitis  Treatment:     1. S/P Pancreatoduodenectomy on  10/06/2017  2. Adjuvant monotherapy with Capecitabine - s/p 6 cycles   (12/4/2017 - 05/2018)    Subjective:      Mary Alice Turner is a 62 y.o. male with a diagnosis of extrahepatic cholangiocarcinoma. He presented to the ED in August 2017 with obstructive jaundice. He was found to have an obstructive lesion in the dital bile duct. The CT showed marked intrahepatic biliary dilation and common bile duct dilation to the level of the mid common bile duct, which ws markedly narrowed. He underwent a stenting procedure followed by spyglass cholangiogram. The brushings revealed a diagnosis of cholangiocarcinoma. He underwent a Whipple procedure on 10/06/2017. He completed 6 cycles of adjuvant Xeloda. Mr. Gabriele Curry was admitted to the hospital with severe abdominal pain. The pain came on very suddenly in the bathroom it felt like a hot poker sticking straight through him. This is the worst pain he had ever had including when he had a kidney stone. Because the pain was so intense he ended up coming to the emergency room and was admitted. CT scan was done which shows it to be fairly stable with no evidence of progression of his cancer. The CT suggest that we are dealing with pancreatitis. The patient is doing well with 1 mg of Dilaudid IV every 6 hours. Hopefully will build to get him switched over to oral medications and home fairly soon.       Review of Systems:    Constitutional: negative  Eyes: negative  Ears, Nose, Mouth, Throat, and Face: negative  Respiratory: negative  Cardiovascular: negative  Gastrointestinal: negative  Genitourinary:negative  Integument/Breast: negative  Hematologic/Lymphatic: negative  Musculoskeletal:negative  Neurological: negative      Past Medical History:   Diagnosis Date    Arrhythmia     Previous a.fib, ablation, NSR now; NO LONGER FOLLOWED BY CARDIOLOGIST.  Autoimmune disease ([facility] [address]     SJOGREN'S    Cancer ([facility] [address]     COMMON BILE DUCT, ADENOCARCINOMA    Diabetes ([facility] [address]     Family history of skin cancer     Hypertension     Sepsis ([facility] [address] 2017    STENTING TO BILE DUCT    Status post chemotherapy     Stroke [facility]) 2008    brain aneurysm - no deficits    Sun-damaged skin     Sunburn, blistering      Past Surgical History:   Procedure Laterality Date    ABDOMEN SURGERY PROC UNLISTED  10/2017    Whippolvin     HX GI      COLONOSCOPY, POLYPS (BENIGN)    HX GI  10/06/2017    Viktor [facility]     HX HEART CATHETERIZATION  2005    Ablation     HX ORTHOPAEDIC  2000    Spinal fusion W/ HARDWARE    HX OTHER SURGICAL  11/07/2017    [place] Cath, Dr. Roberth Sneed  2017    PORTACATH - then removed    NEUROLOGICAL PROCEDURE UNLISTED  2005    Jaquita Fortis hole washout from cerebral hemorrhage      Family History   Problem Relation Age of Onset    Cancer Father         Breast and Colon    Cancer Mother         LEUKEMIA    No Known Problems Sister     No Known Problems Brother     No Known Problems Sister     Anesth Problems Neg Hx      Social History     Tobacco Use    Smoking status: Never Smoker    Smokeless tobacco: Never Used   Substance Use Topics    Alcohol use: Yes     Comment: very rare      Prior to Admission medications    Medication Sig Start Date End Date Taking? Authorizing Provider   sucralfate (CARAFATE) 1 gram tablet Take 1 g by mouth four (4) times daily. Yes Provider, Historical   omeprazole (PRILOSEC) 40 mg capsule Take 1 Cap by mouth daily.  11/5/19  Yes Doron Lara MD empagliflozin (JARDIANCE) 25 mg tablet Take 1 Tab by mouth daily. 5/30/19  Yes Jumana Caraballo MD   famotidine (PEPCID) 20 mg tablet Take 20 mg by mouth daily. 2/22/11  Yes Provider, Historical   sildenafil citrate (VIAGRA) 50 mg tablet Take 1 Tab by mouth as needed (ED). 5/6/19  Yes Silas Raya III, DO   ramipril (ALTACE) 10 mg capsule Take 1 Cap by mouth nightly. 1/4/19  Yes Silas Raya III, DO   calcium carbonate (TUMS) 200 mg calcium (500 mg) chew Take 2 Tabs by mouth three (3) times daily as needed (indigestion). Yes Kimberlee, MD Flynn   ondansetron (ZOFRAN ODT) 4 mg disintegrating tablet Take 1 Tab by mouth every eight (8) hours as needed for Nausea. 11/7/19   Jimmy Blake MD   metFORMIN ER (GLUCOPHAGE XR) 500 mg tablet TAKE 1 TABLET BY MOUTH TWICE A DAY 9/18/19   Silas Raya III, DO   metFORMIN ER (GLUCOPHAGE XR) 500 mg tablet Take 1 Tab by mouth two (2) times a day. 9/18/19   Silas Raya III, DO   ondansetron (ZOFRAN ODT) 8 mg disintegrating tablet DISSOLVE 1 TAB BY MOUTH EVERY 8 HOURS AS NEEDED FOR NAUSEA. 5/30/19   Provider, Historical   Mago Rohan METER misc USE AS DIRECTED 5/30/19   Provider, Historical   Fransisca Toth LANCETS 30 gauge misc USE AS DIRECTED 5/30/19   Provider, Historical   Blood-Glucose Meter monitoring kit 1 preferred brand glucometer for checking home glucose 5/30/19   Jumana Caraballo MD   lancets misc Use preferred brand; Check glucose 1 time daily, Diagnosis E11.65 5/30/19   Jumana Caraballo MD   glucose blood VI test strips (PHARMACIST CHOICE) strip Use preferred brand; Check glucose 1 times daily, Diagnosis E11.65 5/30/19   Jumana Caraballo MD   gabapentin (NEURONTIN) 300 mg capsule TAKE 3 CAPS BY MOUTH TWO (2) TIMES A DAY. 5/15/19   Enid OGDEN III, DO   aspirin delayed-release 81 mg tablet Take 81 mg by mouth daily.     Provider, Historical   promethazine (PHENERGAN) 25 mg tablet Take 1 Tab by mouth every six (6) hours as needed for Nausea. 5/6/19   James OGDEN III, DO   tamsulosin (FLOMAX) 0.4 mg capsule TAKE ONE CAPSULE BY MOUTH EVERY EVENING 2/28/19   Provider, Historical   cyanocobalamin (VITAMIN B12) 1,000 mcg/mL injection INJECT CONTENTS OF ONE VIAL INTRAMUSCULARLY EVERY OTHER WEEK 9/20/18   Silas Raya III, DO   alpha-D-galactosidase (BEANO PO) Take 1 Tab by mouth two (2) times a day. Other, MD Flynn   pantoprazole (PROTONIX) 40 mg tablet TAKE 1 BY MOUTH EVERY MORNING FOR 30 DAYS 7/22/18   Provider, Historical   cholecalciferol, vitamin D3, (VITAMIN D3 PO) Take 1 Tab by mouth daily. Provider, Historical   cetirizine (ZYRTEC) 10 mg tablet Take 10 mg by mouth nightly. Provider, Historical          Allergies   Allergen Reactions    Shellfish Derived Anaphylaxis     Soft shell crabs    Pcn [Penicillins] Other (comments)     Pt stated \"always been told PCN\"           Objective:     Vitals:    11/17/19 2203 11/18/19 0322 11/18/19 0840 11/18/19 1409   BP: 108/69 105/64 100/49 115/65   Pulse: 75 72 74 78   Resp: 18 18 16 16   Temp: 97.8 °F (36.6 °C) 98.2 °F (36.8 °C) 98.6 °F (37 °C) 97.4 °F (36.3 °C)   SpO2: 94% 94% 95% 95%   Weight:       Height:            Pain Scale:0 /10          Physical Exam:    GENERAL: alert, cooperative, no distress, appears stated age  EYE: negative  LYMPHATIC: Cervical, supraclavicular, and axillary nodes normal.   THROAT & NECK: normal and no erythema or exudates noted. LUNG: clear to auscultation bilaterally  HEART: regular rate and rhythm  ABDOMEN: soft, non-tender at the present  EXTREMITIES:  no edema  SKIN: Normal.  NEUROLOGIC: negative        CT Results (most recent):  Results from Hospital Encounter encounter on 11/17/19   CT ABD PELV W CONT    Narrative EXAM: CT ABD PELV W CONT    INDICATION: hx of whipple, recent EGD now with severe onset abd pain. Lipase >  3,000    COMPARISON: 11/7/2019     CONTRAST: 100 mL of Isovue-370.     TECHNIQUE:   Following the uneventful intravenous administration of contrast, thin axial  images were obtained through the abdomen and pelvis. Coronal and sagittal  reconstructions were generated. Oral contrast was not administered. CT dose  reduction was achieved through use of a standardized protocol tailored for this  examination and automatic exposure control for dose modulation. FINDINGS:   LUNG BASES: There is mild bibasilar atelectasis. INCIDENTALLY IMAGED HEART AND MEDIASTINUM: Unremarkable. LIVER: No mass or biliary dilatation. Pneumobilia is related to the prior  choledocho jejunal anastomosis. GALLBLADDER: Status post cholecystectomy. SPLEEN: No mass. PANCREAS: Status post Whipple procedure with a pancreatic jejunal anastomosis. There is increased mild pancreatic ductal dilatation. There is peripancreatic  stranding. This extends into the retroperitoneum and surrounds the aortic branch  vessels. This also extends of the right upper quadrant is seen adjacent to  multiple loops of bowel. ADRENALS: Unremarkable. KIDNEYS: There is a 2.1 cm cyst in the anterior left kidney. There is a 5 mm  cyst in the superior pole left kidney. The kidneys are otherwise unremarkable. No hydronephrosis. STOMACH: Status post partial gastrectomy. SMALL BOWEL: No dilatation or wall thickening. COLON: No dilatation or wall thickening. Multiple colonic diverticula are  present. APPENDIX: Unremarkable. PERITONEUM: No ascites or pneumoperitoneum. RETROPERITONEUM: There is unchanged mild soft tissue thickening surrounding the  SMA origin. REPRODUCTIVE ORGANS: Unremarkable. URINARY BLADDER: No mass or calculus. BONES: No destructive bone lesion. Chronic deformity of the posterior right  iliac bone related to prior bone graft harvest. Status post L5-S1 posterior  fusion. ADDITIONAL COMMENTS: N/A      Impression IMPRESSION:    1. Status post Whipple procedure. 2. Increased mild pancreatic ductal dilatation.  Significant peripancreatic  stranding extending into the right upper quadrant and retroperitoneum. Findings  are likely related to acute pancreatitis. 3. Unchanged mild soft tissue thickening is seen in the retroperitoneum  surrounding the SMA. No evidence of metastatic disease. Assessment:     1. Extrahepatic cholangiocarcinoma:    T3 N1 (1 of 12 LN +ve)  Invasion into pancreas  R0 resection  Presently still in CR    2. pancreatitis  > S/P Pancreatoduodenectomy on  10/06/2017    Completed adjuvant treatment with single agent Capecitabine - s/p 6 cycles (12/4/2017 - 05/2018). CT abd/pelvis 8/21/2019 - no evidence of recurrent disease    Asymptomatic  In remission  Continue surveillance      Plan:       > Labs CBC, CMP and CA 19-9 pending  > CT Abd Pelv in 6 months as out pt  > Follow-up apt has been set for February. Signed by: Kirti Brar MD                     November 18, 2019          CC. Heather Diego MD  CC. Janee Castillo MD  CC. Oxana Orr MD  CC.  Annalisa Tejeda MD

## 2019-11-18 NOTE — PROGRESS NOTES
Care Management Interventions  PCP Verified by CM: Yes  Palliative Care Criteria Met (RRAT>21 & CHF Dx)?: No  Mode of Transport at Discharge: Other (see comment)  MyChart Signup: No  Discharge Durable Medical Equipment: No  Health Maintenance Reviewed: Yes  Physical Therapy Consult: No  Occupational Therapy Consult: No  Speech Therapy Consult: No  Current Support Network: Lives with Spouse, Family Lives Nearby  Confirm Follow Up Transport: Family  Plan discussed with Pt/Family/Caregiver: Yes  [place] Resource Information Provided?: No  Discharge Location  Discharge Placement: Home with family assistance    Reason for Admission:                     RRAT Score:     14             Do you (patient/family) have any concerns for transition/discharge? His wife, Mariela Johnson and they have 5 children, but 2 are listed as emergency contact. Plan for utilizing home health:   No indication at this time. Current Advanced Directive/Advance Care Plan:  Not on file, but he said his wife can make decisions if needed. Transition of Care Plan:        Reviewed chart for transitions of care,and discussed in rounds. CM met with patient at bedside to explain role and offer support. Patient is alert and oriented x4, and confirmed demographics. He lives with supportive wife, and has 5 children nearby. His preferred pharmacy is [facility] on [address] [place] [address], and his PCP is Dr. Quirino Katz. Patient is independent, drives, and works. Patient has not been to rehab or received any home health in the past. Patient is s/p 11/14 ultrasound/whipple procedure. His wife is concerned about seeing GI specialist and requested for Dr. Dana Blanco to place consult. There are other no other discharge concerns at this time, but  Cm available if any needs arise.      Ana Trejo, [facility]

## 2019-11-18 NOTE — PROGRESS NOTES
Hospitalist Progress Note  Joycelyn Atkins MD  Answering service: [phone] from in house phone        Date of Service:  2019  NAME:  Genet Lambert  :  1955  MRN:  [id number]      Admission Summary:   61 yo male with a history of cholangiocarcinoma s/p whipple in 2017 is admitted for treatment of pancreatitis. Interval history / Subjective:   Pt reports slight improvement but continued epigastric pain. Nausea resolved. Assessment & Plan:     Acute pancreatitis  -NPO except meds  -IVF  -prn zofran  -IV pain control    Retroperitoneal mass  -possible tumor recurrence  -IR consulted for possible biopsy    Diabetes mellitus type 2  -NPO  -monitor blood sugars  -hold oral meds    Cholangiocarcinoma  -heme-onc consulted    Code status: full  DVT prophylaxis: SCD    Care Plan discussed with: Patient/Family  Disposition: Home w/Family and TBD     Hospital Problems  Date Reviewed: 2019          Codes Class Noted POA    Pancreatitis ICD-10-CM: K85.90  ICD-9-CM: 688.4  2019 Unknown                Review of Systems:   A comprehensive review of systems was negative except for that written in the HPI. Vital Signs:    Last 24hrs VS reviewed since prior progress note. Most recent are:  Visit Vitals  /65 (BP 1 Location: Right arm, BP Patient Position: At rest)   Pulse 78   Temp 97.4 °F (36.3 °C)   Resp 16   Ht 5' 8\" (1.727 m)   Wt 78.5 kg (173 lb)   SpO2 95%   BMI 26.30 kg/m²       No intake or output data in the 24 hours ending 19 1511     Physical Examination:             Constitutional:  No acute distress, cooperative, pleasant    ENT:  Oral mucous moist, oropharynx benign. Resp:  CTA bilaterally. No wheezing/rhonchi/rales. No accessory muscle use   CV:  Regular rhythm, normal rate, no murmurs, gallops, rubs    GI:  Soft, non distended, TTP in epigastric area.  normoactive bowel sounds, no hepatosplenomegaly     Musculoskeletal:  No edema, warm, 2+ pulses throughout    Neurologic:  Moves all extremities. AAOx3, CN II-XII reviewed     Psych:  Good insight, Not anxious nor agitated. Data Review:    Review and/or order of clinical lab test      Labs:     Recent Labs     11/18/19  1131 11/17/19  1726   WBC 7.7 15.5*   HGB 13.7 16.8   HCT 42.9 51.8*    161     Recent Labs     11/18/19  0435 11/17/19  1726    137   K 4.0 3.7    105   CO2 25 26   BUN 16 22*   CREA 0.63* 0.87   * 129*   CA 8.0* 9.2     Recent Labs     11/18/19  1131 11/18/19  0435 11/17/19  1726   SGOT  --  10* 18   ALT  --  30 39   AP  --  101 127*   TBILI  --  1.5* 1.1*   TP  --  5.1* 7.0   ALB  --  3.2* 4.2   GLOB  --  1.9* 2.8   LPSE 1,069*  --  >3,000*     No results for input(s): INR, PTP, APTT, INREXT in the last 72 hours. No results for input(s): FE, TIBC, PSAT, FERR in the last 72 hours. No results found for: FOL, RBCF   No results for input(s): PH, PCO2, PO2 in the last 72 hours. No results for input(s): CPK, CKNDX, TROIQ in the last 72 hours.     No lab exists for component: CPKMB  Lab Results   Component Value Date/Time    Cholesterol, total 76 (L) 10/30/2018 09:39 AM    HDL Cholesterol 25 (L) 10/30/2018 09:39 AM    LDL, calculated 41 10/30/2018 09:39 AM    Triglyceride 48 10/30/2018 09:39 AM    CHOL/HDL Ratio 4.1 08/25/2017 12:13 AM     Lab Results   Component Value Date/Time    Glucose (POC) 84 11/18/2019 11:37 AM    Glucose (POC) 100 11/18/2019 06:48 AM    Glucose (POC) 115 (H) 11/17/2019 10:24 PM    Glucose (POC) 183 (H) 08/18/2018 11:53 AM    Glucose (POC) 123 (H) 08/18/2018 06:10 AM     Lab Results   Component Value Date/Time    Color YELLOW/STRAW 11/07/2019 11:13 AM    Appearance CLEAR 11/07/2019 11:13 AM    Specific gravity 1.015 11/07/2019 11:13 AM    Specific gravity 1.027 08/16/2018 08:56 AM    pH (UA) 6.0 11/07/2019 11:13 AM    Protein NEGATIVE  11/07/2019 11:13 AM    Glucose >1,000 (A) 11/07/2019 11:13 AM    Ketone NEGATIVE  11/07/2019 11:13 AM    Bilirubin NEGATIVE  11/07/2019 11:13 AM    Urobilinogen 0.2 11/07/2019 11:13 AM    Nitrites NEGATIVE  11/07/2019 11:13 AM    Leukocyte Esterase NEGATIVE  11/07/2019 11:13 AM    Epithelial cells FEW 11/07/2019 11:13 AM    Bacteria NEGATIVE  11/07/2019 11:13 AM    WBC 0-4 11/07/2019 11:13 AM    RBC 0-5 11/07/2019 11:13 AM         Medications Reviewed:     Current Facility-Administered Medications   Medication Dose Route Frequency    sodium chloride (NS) flush 5-40 mL  5-40 mL IntraVENous Q8H    sodium chloride (NS) flush 5-40 mL  5-40 mL IntraVENous PRN    famotidine (PF) (PEPCID) 20 mg in sodium chloride 0.9% 10 mL injection  20 mg IntraVENous Q12H    aspirin delayed-release tablet 81 mg  81 mg Oral DAILY    lisinopril (PRINIVIL, ZESTRIL) tablet 40 mg  40 mg Oral QHS    0.9% sodium chloride infusion  175 mL/hr IntraVENous CONTINUOUS    HYDROmorphone (PF) (DILAUDID) injection 1 mg  1 mg IntraVENous Q6H PRN    ondansetron (ZOFRAN) injection 4 mg  4 mg IntraVENous Q6H PRN    tamsulosin (FLOMAX) capsule 0.4 mg  0.4 mg Oral DAILY    [Held by provider] enoxaparin (LOVENOX) injection 40 mg  40 mg SubCUTAneous Q24H    sucralfate (CARAFATE) tablet 1 g  1 g Oral AC&HS     ______________________________________________________________________  EXPECTED LENGTH OF STAY: - - -  ACTUAL LENGTH OF STAY:          1                 Himanshu Elam MD

## 2019-11-18 NOTE — TELEPHONE ENCOUNTER
CONSULT    Patient: Johny Age     : 57    Referring Physician: Dr Porter Sero    Reason: Hx cholangiocarcinoma w/p whipple w/retroperitoneal soft tissue mass    Room #:  684    Nurse: Venancio Cuellar

## 2019-11-18 NOTE — CONSULTS
Patient seen at request of Dr. Holly Perera. Rivera Vieira is an 62 y.o. 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 Mr. Dimitry Montoya tells me that he recently began experiencing severe epigastric abdominal pain. Associated increased nausea and vomitting. No hematemesis or coffee ground emesis. No fevers or chills. Mr. Dimitry Montoya denies chest pain or shortness of breath. He has otherwise been in his usual state of health. Found to have a lipase > 3000 on 11/17/2019 which is down to 1069 today. CT scan abdomen/pelvis with IV contrast - 11/17/2019 - Status post Whipple procedure. Increased mild pancreatic ductal dilatation. Significant peripancreatic stranding extending into the right upper quadrant and retroperitoneum. Findings are likely related to acute pancreatitis. Unchanged mild soft tissue thickening is seen in the retroperitoneum surrounding the SMA. EUS  - 11/14/2019 - Small hiatal hernia, moderate gastropathy with bile. Small benign antral nodule. Normal jejunum. The mass lesion reported on CT Scan is not visible through stomach. Origin of celiac artery is easily seen. Prominent left adrenal gland. Allergies - Shellfish derived and Pcn [penicillins]    Meds - Reviewed. PMH -   Past Medical History:   Diagnosis Date    Arrhythmia     Previous a.fib, ablation, NSR now; NO LONGER FOLLOWED BY CARDIOLOGIST.     Autoimmune disease ([address]     SJOGREN'S    Cancer ([address]     COMMON BILE DUCT, ADENOCARCINOMA    Diabetes ([address]     Family history of skin cancer     Hypertension     Sepsis ([address] 2017    STENTING TO BILE DUCT    Status post chemotherapy     Stroke [facility]) 2008    brain aneurysm - no deficits    Sun-damaged skin     Sunburn, blistering      PSH -   Past Surgical History:   Procedure Laterality Date    ABDOMEN SURGERY PROC UNLISTED  10/2017    Whipple     HX GI COLONOSCOPY, POLYPS (BENIGN)    HX GI  10/06/2017    [facility]     [address]  [address]    Ablation     HX ORTHOPAEDIC  2000    Spinal fusion W/ HARDWARE    HX OTHER SURGICAL  11/07/2017    [place] [facility], Dr. Jeromy Thompson  2017    PORTACATH - then removed    NEUROLOGICAL PROCEDURE UNLISTED  2005    [place] hole washout from cerebral hemorrhage     Fam Hx -   Family History   Problem Relation Age of Onset    Cancer Father         Breast and Colon    Cancer Mother         LEUKEMIA    No Known Problems Sister     No Known Problems Brother     No Known Problems Sister     Anesth Problems Neg Hx      Soc Hx -   Social History     Tobacco Use    Smoking status: Never Smoker    Smokeless tobacco: Never Used   Substance Use Topics    Alcohol use: Yes     Comment: very rare     Patient is a well developed, well nourished man in no acute distress. Visit Vitals  /65 (BP 1 Location: Right arm, BP Patient Position: At rest)   Pulse 78   Temp 97.4 °F (36.3 °C)   Resp 16   Ht 5' 8\" (1.727 m)   Wt 173 lb (78.5 kg)   SpO2 95%   BMI 26.30 kg/m²     HEENT: Anicteric. Neck: Supple without palpable lymphadenopathy. Cor: RRR. Lungs: Clear to auscultation bilaterally. Abd: Soft. Non distended. Epigastric tenderness. No guarding or rebound. Ext: No edema. Neuro: Grossly Non focal.   Skin: Well healed midline abdominal scar.      Labs -   Recent Results (from the past 24 hour(s))   GLUCOSE, POC    Collection Time: 11/17/19 10:24 PM   Result Value Ref Range    Glucose (POC) 115 (H) 65 - 100 mg/dL    Performed by Dave Chambers, COMPREHENSIVE    Collection Time: 11/18/19  4:35 AM   Result Value Ref Range    Sodium 138 136 - 145 mmol/L    Potassium 4.0 3.5 - 5.1 mmol/L    Chloride 107 97 - 108 mmol/L    CO2 25 21 - 32 mmol/L    Anion gap 6 5 - 15 mmol/L    Glucose 116 (H) 65 - 100 mg/dL    BUN 16 6 - 20 MG/DL    Creatinine 0.63 (L) 0.70 - 1.30 MG/DL    BUN/Creatinine ratio 25 (H) 12 - 20      GFR est AA >60 >60 ml/min/1.73m2    GFR est non-AA >60 >60 ml/min/1.73m2    Calcium 8.0 (L) 8.5 - 10.1 MG/DL    Bilirubin, total 1.5 (H) 0.2 - 1.0 MG/DL    ALT (SGPT) 30 12 - 78 U/L    AST (SGOT) 10 (L) 15 - 37 U/L    Alk. phosphatase 101 45 - 117 U/L    Protein, total 5.1 (L) 6.4 - 8.2 g/dL    Albumin 3.2 (L) 3.5 - 5.0 g/dL    Globulin 1.9 (L) 2.0 - 4.0 g/dL    A-G Ratio 1.7 1.1 - 2.2     GLUCOSE, POC    Collection Time: 11/18/19  6:48 AM   Result Value Ref Range    Glucose (POC) 100 65 - 100 mg/dL    Performed by Anayeli Whitney    CBC WITH AUTOMATED DIFF    Collection Time: 11/18/19 11:31 AM   Result Value Ref Range    WBC 7.7 4.1 - 11.1 K/uL    RBC 4.68 4.10 - 5.70 M/uL    HGB 13.7 12.1 - 17.0 g/dL    HCT 42.9 36.6 - 50.3 %    MCV 91.7 80.0 - 99.0 FL    MCH 29.3 26.0 - 34.0 PG    MCHC 31.9 30.0 - 36.5 g/dL    RDW 14.9 (H) 11.5 - 14.5 %    PLATELET 046 931 - 967 K/uL    MPV 10.8 8.9 - 12.9 FL    NRBC 0.0 0  WBC    ABSOLUTE NRBC 0.00 0.00 - 0.01 K/uL    NEUTROPHILS 75 32 - 75 %    LYMPHOCYTES 12 12 - 49 %    MONOCYTES 9 5 - 13 %    EOSINOPHILS 3 0 - 7 %    BASOPHILS 0 0 - 1 %    IMMATURE GRANULOCYTES 0 0.0 - 0.5 %    ABS. NEUTROPHILS 5.8 1.8 - 8.0 K/UL    ABS. LYMPHOCYTES 0.9 0.8 - 3.5 K/UL    ABS. MONOCYTES 0.7 0.0 - 1.0 K/UL    ABS. EOSINOPHILS 0.2 0.0 - 0.4 K/UL    ABS. BASOPHILS 0.0 0.0 - 0.1 K/UL    ABS. IMM. GRANS. 0.0 0.00 - 0.04 K/UL    DF AUTOMATED     LIPASE    Collection Time: 11/18/19 11:31 AM   Result Value Ref Range    Lipase 1,069 (H) 73 - 393 U/L   GLUCOSE, POC    Collection Time: 11/18/19 11:37 AM   Result Value Ref Range    Glucose (POC) 84 65 - 100 mg/dL    Performed by [address] [place] [address] [address], POC    Collection Time: 11/18/19  4:57 PM   Result Value Ref Range    Glucose (POC) 85 65 - 100 mg/dL    Performed by Zuhair Lagos (con)      CT Scan - Reviewed.     Imp: Mr. Mark Verde is a 62 y.o. male, s/p [place] in 10/2017 followed by adjuvant chemotherapy, with pancreatitis. Plan: 1. At this point in time, do not believe that there is an indication for surgical intervention. 2. NPO except ice chips and sips of clear liquids for now. 3. Continue IVF - will decrease to 125ml/hour. 4. Dr. Sanchez Began input - noted. 5. GI following. 6. Pain medication and anti-emetics as needed. 7. Continue Pepcid. 8. Labs in AM.    9. Plans per Dr. Antionette Page.

## 2019-11-18 NOTE — ED PROVIDER NOTES
Patient is a 80-year-old male presenting to the pain. Patient states that started with acute onset abdominal pain at approximately 2 PM today describes the pain is 10/10 epigastric with associated nausea and vomiting. Patient states that when he is been vomiting been yellowish colored bile. Patient recently had a endoscopic ultrasound performed on 11/14 by Dr. Mary Daniel at [facility]. Patient is status post Whipple procedure for adenocarcinoma of the common bile duct. Patient denies any fevers, chills, chest pain, shortness of breath. Patient has not been able to tolerate p.o. On chart review endoscopic ultrasound performed was to further evaluate possible mass found on CT scan recently however no biopsies or complications were noted on ultrasound. Past Medical History:   Diagnosis Date    Arrhythmia     Previous a.fib, ablation, NSR now; NO LONGER FOLLOWED BY CARDIOLOGIST.     Autoimmune disease ([facility]     SJOGREN'S    Cancer ([facility]     COMMON BILE DUCT, ADENOCARCINOMA    Diabetes ([facility]     Family history of skin cancer     Hypertension     Sepsis ([facility] 2017    STENTING TO BILE DUCT    Status post chemotherapy     Stroke [facility]) 2008    brain aneurysm - no deficits    Sun-damaged skin     Sunburn, blistering        Past Surgical History:   Procedure Laterality Date    ABDOMEN SURGERY PROC UNLISTED  10/2017    Whipple     HX GI      COLONOSCOPY, POLYPS (BENIGN)    HX GI  10/06/2017    [facility] [place]     [address]  [address]    Ablation     HX ORTHOPAEDIC  2000    Spinal fusion W/ HARDWARE    HX OTHER SURGICAL  11/07/2017    [place] [facility], Dr. Leah Cordova  2017    PORTACATH - then removed    NEUROLOGICAL PROCEDURE UNLISTED  2005    [place] hole washout from cerebral hemorrhage         Family History:   Problem Relation Age of Onset    Cancer Father         Breast and Colon    Cancer Mother         LEUKEMIA    No Known Problems Sister  No Known Problems Brother     No Known Problems Sister     Anesth Problems Neg Hx        Social History     Socioeconomic History    Marital status:      Spouse name: Not on file    Number of children: Not on file    Years of education: Not on file    Highest education level: Not on file   Occupational History    Not on file   Social Needs    Financial resource strain: Not on file    Food insecurity:     Worry: Not on file     Inability: Not on file    Transportation needs:     Medical: Not on file     Non-medical: Not on file   Tobacco Use    Smoking status: Never Smoker    Smokeless tobacco: Never Used   Substance and Sexual Activity    Alcohol use: Yes     Comment: very rare    Drug use: No    Sexual activity: Yes     Partners: Female   Lifestyle    Physical activity:     Days per week: Not on file     Minutes per session: Not on file    Stress: Not on file   Relationships    Social connections:     Talks on phone: Not on file     Gets together: Not on file     Attends [employer] service: Not on file     Active member of club or organization: Not on file     Attends meetings of clubs or organizations: Not on file     Relationship status: Not on file    Intimate partner violence:     Fear of current or ex partner: Not on file     Emotionally abused: Not on file     Physically abused: Not on file     Forced sexual activity: Not on file   Other Topics Concern    Not on file   Social History Narrative    Not on file         ALLERGIES: Shellfish derived and Pcn [penicillins]    Review of Systems   Constitutional: Positive for activity change, appetite change and fatigue. Respiratory: Negative for shortness of breath. Cardiovascular: Negative for chest pain. Gastrointestinal: Positive for abdominal pain, nausea and vomiting. Genitourinary: Negative for dysuria, hematuria and urgency. All other systems reviewed and are negative.       Vitals:    11/17/19 1709 11/17/19 1800 11/17/19 1815   BP: 141/74 123/62 132/67   Pulse: 86 88 90   Resp: 17 18 18   Temp: 98.2 °F (36.8 °C)     SpO2: 97% 90% 91%   Weight: 78.5 kg (173 lb)     Height: 5' 8\" (1.727 m)              Physical Exam   Constitutional: He is oriented to person, place, and time. He appears well-developed and well-nourished. He appears ill. He appears distressed. HENT:   Head: Normocephalic and atraumatic. Eyes: Pupils are equal, round, and reactive to light. EOM are normal.   Cardiovascular: Normal rate and regular rhythm. Pulmonary/Chest: Effort normal and breath sounds normal.   Abdominal: Bowel sounds are normal. There is no hepatosplenomegaly. There is tenderness in the epigastric area. There is guarding. There is no rigidity, no rebound, no CVA tenderness and negative Dhaliwal's sign. Neurological: He is alert and oriented to person, place, and time. Skin: Skin is warm and dry. Psychiatric: He has a normal mood and affect. His behavior is normal.   Nursing note and vitals reviewed. MDM  Number of Diagnoses or Management Options  Acute pancreatitis without infection or necrosis, unspecified pancreatitis type:   Diagnosis management comments: A/P: Acute pancreatitis. 46-year-old male presenting with severe acute onset abdominal pain history of adenocarcinoma of the common bile duct patient is status post Whipple recently underwent endoscopic ultrasound to further identify mass seen on prior CT scan. Less likely complication from endoscopy. CBC, CMP, lipase, lactate, IV fluids 1 L normal saline, 1 mg Dilaudid IV. Compazine for nausea. Lipase greater than 3000 patient will require admission for pancreatitis.        Amount and/or Complexity of Data Reviewed  Clinical lab tests: ordered and reviewed  Tests in the radiology section of CPT®: reviewed and ordered  Review and summarize past medical records: yes  Discuss the patient with other providers: yes  Independent visualization of images, tracings, or specimens: yes    Risk of Complications, Morbidity, and/or Mortality  Presenting problems: moderate  Diagnostic procedures: moderate  Management options: moderate    Patient Progress  Patient progress: stable         Procedures

## 2019-11-18 NOTE — Clinical Note
11/18 - EUS 11/14;  Subsequent IP for pancreatitis [lipase >3000] 11/17. Was going to call him today- but he is inpt. Can you follow for discharge?

## 2019-11-18 NOTE — PROGRESS NOTES
Primary Nurse Denia Leslie and Deborah Crow RN performed a dual skin assessment on this patient    Jeet score is 19

## 2019-11-18 NOTE — PROGRESS NOTES

## 2019-11-18 NOTE — PROGRESS NOTES
[facility] Progress note:      Patient well known to  with support for oncology/ cholangiocarcinoma of biliary tract with whipple procedure and oral chemo.  Found with 2 ED visits 11/5 and 11/7 for abd pain with N/V/D.    * ED note for 11/8 is noted to be opened in error.    * EUS on 11/14 with subsequent inpt admission for pancreatitis 11/17. Chart Review only:  Currently in inpt stay. * PCP apt for today cancelled. * No GI consult as of this chart review. Lipase >3000. * No call to patient . *  Transfering ECM care to Kandice Kincaid RN during this 's medical ZARI. Chart Review:   IP admission 11/17 pancreatitis:  Lipase >3000. HISTORY OF PRESENT ILLNESS:  The patient is a 61-year-old gentleman whose previous history is significant for cholangiocarcinoma diagnosed in 2017 status post Whipple surgery in 2017, had finished chemotherapy in the past.  The patient also has history of diabetes, hypertension and remote history of AFib status post ablation was brought to the emergency room due to abdominal pain. The patient's wife tells me that he was seen in the emergency room on 11/7. At that time, he had abdominal pain. CT showed increased retroperitoneal soft tissue swelling, which was thought due to tumor recurrence. The patient was subsequently seen by Dr. Angela Merrill who did an EGD with EUS on the 14th of this month, which showed small hiatal hernia with moderate gastropathy with bile, small benign antral nodule. EUS did not show any visible mass lesion. Today, the patient came to the emergency room, because he had abdominal pain. He also had one episode of vomiting. The patient said that he had two episodes of vomiting yesterday. In the emergency room patient's blood work showed lipase of more than 3000. He was given one dose of Dilaudid. The patient says that he has been taking Ultram, which did not help him.   He denies having any chest pain or shortness of breath.     PAST MEDICAL HISTORY:  Significant for:  1. Cholangiocarcinoma status post Whipple surgery in 2017.  2.  History of type 2 diabetes. 3.  Hypertension. 4.  History of intracerebral hemorrhage due to aneurysm. 5.  History of atrial fibrillation status post ablation many years ago. 6.  History of Sjögren's disease. 7.  History of EUS and EGD recently. ASSESSMENT/PLAN:     The patient is a very pleasant 78-year-old  male who has previous history significant for cholangiocarcinoma status post Whipple surgery in 2017, also has history of two episodes of pancreatitis since Whipple surgery is being admitted with:     1. Abdominal pain. Clinical picture suggestive of acute pancreatitis. The patient will be admitted. We will give him IV fluids and pain control. 2.  Reconsult GI. He had a recent EGD with EUS which showed small hiatal hernia. EUS did not show any visible mass which was seen previously on the CT scan. Liver parenchyma was also normal.  Pancreas areas examined on US were also normal. Wife tells me Carafate was added to his regimen by GI. 3.  Pain control. 4.  History of diabetes. Will keep him n.p.o. and monitor blood sugars. We will hold his oral hypoglycemic agents. 5.  Further recommendation per GI.        Mindy Lopez MD        [id number]  D:  11/17/2019 19:19    11/14  EUS  Esophagus: normal findings              Stomach: normal findings              Pancreas:                           Areas examined: part of the  body, the tail (He has a hx of Whipple's surgery)                          Parenchyma: -normal appearing                          Pancreatic Duct: normal findings              Liver:                           Parenchyma: normal                           Clip artifact in area of previous surgery noted.

## 2019-11-18 NOTE — ROUTINE PROCESS
TRANSFER - OUT REPORT: 
 
Verbal report given to ESVIN Soliman(name) on Juice Herring  being transferred to (unit) for routine progression of care Report consisted of patients Situation, Background, Assessment and  
Recommendations(SBAR). Information from the following report(s) SBAR and ED Summary was reviewed with the receiving nurse. Lines:  
Peripheral IV 11/17/19 Left Antecubital (Active) Opportunity for questions and clarification was provided. Patient transported with: 
 [employer]

## 2019-11-18 NOTE — H&P
[address]   HISTORY AND PHYSICAL    Name:  Lilia Farnsworth  MR#:  [id number]  :  1955  ACCOUNT #:  [de-identified]  ADMIT DATE:  2019      PRIMARY CARE PHYSICIAN:  Dr. Betzy Sam. ONCOLOGIST:  Dr. Chrissie Lomeli. GI DOCTOR:  Dr. Verona White. CHIEF COMPLAINT:  Abdominal pain. HISTORY OF PRESENT ILLNESS:  The patient is a 71-year-old gentleman whose previous history is significant for cholangiocarcinoma diagnosed in 2017 status post Whipple surgery in 2017, had finished chemotherapy in the past.  The patient also has history of diabetes, hypertension and remote history of AFib status post ablation was brought to the emergency room due to abdominal pain. The patient's wife tells me that he was seen in the emergency room on . At that time, he had abdominal pain. CT showed increased retroperitoneal soft tissue swelling, which was thought due to tumor recurrence. The patient was subsequently seen by Dr. Corin Oh who did an EGD with EUS on the  of this month, which showed small hiatal hernia with moderate gastropathy with bile, small benign antral nodule. EUS did not show any visible mass lesion. Today, the patient came to the emergency room, because he had abdominal pain. He also had one episode of vomiting. The patient said that he had two episodes of vomiting yesterday. In the emergency room patient's blood work showed lipase of more than 3000. He was given one dose of Dilaudid. The patient says that he has been taking Ultram, which did not help him. He denies having any chest pain or shortness of breath. PAST MEDICAL HISTORY:  Significant for:  1. Cholangiocarcinoma status post Whipple surgery in 2017.  2.  History of type 2 diabetes. 3.  Hypertension. 4.  History of intracerebral hemorrhage due to aneurysm. 5.  History of atrial fibrillation status post ablation many years ago. 6.  History of Sjögren's disease.   7.  History of EUS and EGD recently. ALLERGIES:  INCLUDE SHELLFISH AND PENICILLIN. CURRENT MEDICATIONS:  His current medications prior to admission include the following. The patient is on:  1. Aspirin 81 daily. 2.  Zyrtec 10 daily  3. Vitamin B12 1000 mcg injection every other week. 4.  Jardiance 25 mg daily. 5.  Pepcid 20 mg daily. 6.  Neurontin 300 mg 3 capsules b.i.d.  7.  Metformin  mg b.i.d.  8.  Omeprazole 40 mg daily. 9.  Phenergan. 10.  Viagra. 11.  Flomax 0.4 mg daily. REVIEW OF SYSTEMS:  Negative except as mentioned in history present illness. All system reviewed. No positive finding was noticed. SOCIAL HISTORY:  The patient does not smoke or drink. He is a farmer. He lives with his wife. Code status is full code. PHYSICAL EXAMINATION:  GENERAL:  The patient is a 79-year-old gentleman not in any acute distress, resting comfortably. VITAL SIGNS:  Temperature 98.2, blood pressure 132/67, pulse 90, respiratory rate 18, saturation is 91% on room air. HEENT:  Examination reveals pupils equally reacting to light and accommodation. NECK:  Supple. There is no adenopathy or JVD. CHEST:  Clear. No wheezing or crackles. CARDIOVASCULAR:  S1 and S2 regular. No murmur. No S3.  ABDOMEN:  Examination reveals no significant tenderness, no guarding or rigidity. No signs of chronic liver disease. EXTREMITIES:  No pedal edema. CNS:  Examination is unremarkable. The patient is alert, oriented, has normal strength, normal reflexes. Plantar is downgoing. Cranial nerves are normal.  PSYCHIATRY:  Examination is unremarkable. LABORATORY DATA:  Labs in ER showed a white count of 15.5, hemoglobin 16.8, hematocrit of 61.8, platelet count is 337,712, 87% segs, 4% lymphs. Chemistries, sodium 137, potassium 3.7, chloride is 105, bicarb is 26, gap of 6, glucose 129, BUN is 22, creatinine 0.87, calcium is 9.2, bilirubin is 1.1.  AST, ALT, alk phos showed 39, 38, and 127 respectively. Lipase is more than 3000. CT scan of the abdomen has been ordered with contrast, but has not been done so far. ASSESSMENT/PLAN:    The patient is a very pleasant 67-year-old  male who has previous history significant for cholangiocarcinoma status post Whipple surgery in 2017, also has history of two episodes of pancreatitis since Whipple surgery is being admitted with:    1. Abdominal pain. Clinical picture suggestive of acute pancreatitis. The patient will be admitted. We will give him IV fluids and pain control. 2.  Reconsult GI. He had a recent EGD with EUS which showed small hiatal hernia. EUS did not show any visible mass which was seen previously on the CT scan. Liver parenchyma was also normal.  Pancreas areas examined on US were also normal. Wife tells me Carafate was added to his regimen by GI. 3.  Pain control. 4.  History of diabetes. Will keep him n.p.o. and monitor blood sugars. We will hold his oral hypoglycemic agents. 5.  Further recommendation per GI.       Darshana Akins MD      [id number]  D:  11/17/2019 19:19  T:  11/18/2019 1:02  JOB #:  [id number]

## 2019-11-18 NOTE — CONSULTS
[address]  [address]han, [address]  [phone]                     GI CONSULTATION NOTE  Gage Alfonso, [facility]  Work Cell: [phone]      NAME:  Lavonne Fair   :   1955   MRN:   [id number]       Referring Provider: Dr. Kyaw Toure Date: 2019     Chief Complaint: Abdominal pain    History of Present Illness:  Patient is a 62 y.o. who is seen in consultation at the request of Dr. Crow Balbuena for pancreatitis. Mr. Daksha Pham has a PMH as detailed below including hx cholangiocarcinoma s/p pylorus-preserving Whipple 10/2017. He presented to the hospital with abdominal pain. Onset of this was a month ago. Pain is located in upper abdomen. It is sharp w/ associated n/v and weight loss. He has been to the ER multiple times for this. He was dx with gastritis and given Prilosec to take; however CT  showed increased retroperitoneal soft tissue concerning for possible tumor recurrence. He was seen by us in the office last week and urgent EGD/EUS was done . This showed a small HH, moderate gastropathy w/ bile and gastric nodule. Bx unable to be done during procedure. Case was discussed with IR and CT-guided bx was planned; however yesterday patient developed severe, constant epigastric pain like \"someone was stabbing him with a hot poker. \" This prompted him to come to the hospital. Work-up revealed lipase > 3000 and CT showed increased mild PD dilation w/ acute pancreatitis and unchanged soft tissue thickening in retroperitoneum. Pt feeling better today; however will having epigastric pain and nausea. Lipase improved (1,069). PMH:  Past Medical History:   Diagnosis Date    Arrhythmia     Previous a.fib, ablation, NSR now; NO LONGER FOLLOWED BY CARDIOLOGIST.     Autoimmune disease ([address]     SJOGREN'S    Cancer ([address]     COMMON BILE DUCT, ADENOCARCINOMA    Diabetes ([address]     Family history of skin cancer     Hypertension     Sepsis ([address] 2017 STENTING TO BILE DUCT    Status post chemotherapy     Stroke [facility]) 2008    brain aneurysm - no deficits    Sun-damaged skin     Sunburn, blistering        PSH:  Past Surgical History:   Procedure Laterality Date    ABDOMEN SURGERY PROC UNLISTED  10/2017    [place]     HX GI      COLONOSCOPY, POLYPS (BENIGN)    HX GI  10/06/2017    [facility]     [address]      Ablation     HX ORTHOPAEDIC      Spinal fusion W/ HARDWARE    HX OTHER SURGICAL  2017    [place], Dr. Zamzam Santoro      PORTACATH - then removed    NEUROLOGICAL PROCEDURE UNLISTED      Ting hole washout from cerebral hemorrhage       Allergies: Allergies   Allergen Reactions    Shellfish Derived Anaphylaxis     Soft shell crabs    Pcn [Penicillins] Other (comments)     Pt stated \"always been told PCN\"       Home Medications:  Prior to Admission Medications   Prescriptions Last Dose Informant Patient Reported? Taking? Blood-Glucose Meter monitoring kit   No No   Si preferred brand glucometer for checking home glucose   ONETOUCH DELICA LANCETS 30 gauge misc   Yes No   Sig: USE AS DIRECTED   ONETOUCH ULTRA2 METER misc   Yes No   Sig: USE AS DIRECTED   alpha-D-galactosidase (BEANO PO) 2019 at 0900- Self Yes No   Sig: Take 1 Tab by mouth two (2) times a day. aspirin delayed-release 81 mg tablet Not Taking at Unknown time  Yes No   Sig: Take 81 mg by mouth daily. calcium carbonate (TUMS) 200 mg calcium (500 mg) chew 2019 at Unknown time Self Yes Yes   Sig: Take 2 Tabs by mouth three (3) times daily as needed (indigestion). cetirizine (ZYRTEC) 10 mg tablet 2019 at 2100 Self Yes No   Sig: Take 10 mg by mouth nightly. cholecalciferol, vitamin D3, (VITAMIN D3 PO) Not Taking at Unknown time Self Yes No   Sig: Take 1 Tab by mouth daily.    cyanocobalamin (VITAMIN B12) 1,000 mcg/mL injection 2019  No No   Sig: INJECT CONTENTS OF ONE VIAL INTRAMUSCULARLY EVERY OTHER WEEK   empagliflozin (JARDIANCE) 25 mg tablet 11/17/2019 at 0900  No Yes   Sig: Take 1 Tab by mouth daily. famotidine (PEPCID) 20 mg tablet 11/17/2019 at 0900  Yes Yes   Sig: Take 20 mg by mouth daily. gabapentin (NEURONTIN) 300 mg capsule Not Taking at Unknown time  No No   Sig: TAKE 3 CAPS BY MOUTH TWO (2) TIMES A DAY. glucose blood VI test strips (PHARMACIST CHOICE) strip   No No   Sig: Use preferred brand; Check glucose 1 times daily, Diagnosis E11.65   lancets misc   No No   Sig: Use preferred brand; Check glucose 1 time daily, Diagnosis E11.65   metFORMIN ER (GLUCOPHAGE XR) 500 mg tablet Not Taking at Unknown time  No No   Sig: TAKE 1 TABLET BY MOUTH TWICE A DAY   metFORMIN ER (GLUCOPHAGE XR) 500 mg tablet Not Taking at Unknown time  No No   Sig: Take 1 Tab by mouth two (2) times a day. omeprazole (PRILOSEC) 40 mg capsule 11/17/2019 at 0900  No Yes   Sig: Take 1 Cap by mouth daily. ondansetron (ZOFRAN ODT) 4 mg disintegrating tablet Not Taking at Unknown time  No No   Sig: Take 1 Tab by mouth every eight (8) hours as needed for Nausea. ondansetron (ZOFRAN ODT) 8 mg disintegrating tablet 11/16/2019  Yes No   Sig: DISSOLVE 1 TAB BY MOUTH EVERY 8 HOURS AS NEEDED FOR NAUSEA.   pantoprazole (PROTONIX) 40 mg tablet Not Taking at Unknown time Self Yes No   Sig: TAKE 1 BY MOUTH EVERY MORNING FOR 30 DAYS   promethazine (PHENERGAN) 25 mg tablet Not Taking at Unknown time  No No   Sig: Take 1 Tab by mouth every six (6) hours as needed for Nausea. ramipril (ALTACE) 10 mg capsule 11/16/2019 at Unknown time  No Yes   Sig: Take 1 Cap by mouth nightly. sildenafil citrate (VIAGRA) 50 mg tablet 11/11/2019 at Unknown time  No Yes   Sig: Take 1 Tab by mouth as needed (ED). sucralfate (CARAFATE) 1 gram tablet 11/17/2019 at Unknown time  Yes Yes   Sig: Take 1 g by mouth four (4) times daily.    tamsulosin (FLOMAX) 0.4 mg capsule 11/16/2019  Yes No   Sig: TAKE ONE CAPSULE BY MOUTH EVERY EVENING Facility-Administered Medications: None       Hospital Medications:  Current Facility-Administered Medications   Medication Dose Route Frequency    sodium chloride (NS) flush 5-40 mL  5-40 mL IntraVENous Q8H    sodium chloride (NS) flush 5-40 mL  5-40 mL IntraVENous PRN    famotidine (PF) (PEPCID) 20 mg in sodium chloride 0.9% 10 mL injection  20 mg IntraVENous Q12H    aspirin delayed-release tablet 81 mg  81 mg Oral DAILY    lisinopril (PRINIVIL, ZESTRIL) tablet 40 mg  40 mg Oral QHS    0.9% sodium chloride infusion  175 mL/hr IntraVENous CONTINUOUS    HYDROmorphone (PF) (DILAUDID) injection 1 mg  1 mg IntraVENous Q6H PRN    ondansetron (ZOFRAN) injection 4 mg  4 mg IntraVENous Q6H PRN    tamsulosin (FLOMAX) capsule 0.4 mg  0.4 mg Oral DAILY    [Held by provider] enoxaparin (LOVENOX) injection 40 mg  40 mg SubCUTAneous Q24H    sucralfate (CARAFATE) tablet 1 g  1 g Oral AC&HS       Social History:  Social History     Tobacco Use    Smoking status: Never Smoker    Smokeless tobacco: Never Used   Substance Use Topics    Alcohol use: Yes     Comment: very rare       Family History:  Family History   Problem Relation Age of Onset    Cancer Father         Breast and Colon    Cancer Mother         LEUKEMIA    No Known Problems Sister     No Known Problems Brother     No Known Problems Sister     Anesth Problems Neg Hx        Review of Systems:    Constitutional: negative fever, negative chills, positive weight loss  Eyes:   negative visual changes  ENT:   negative sore throat, tongue or lip swelling  Respiratory:  negative cough, negative dyspnea  Cards:  negative for chest pain, palpitations, lower extremity edema  GI:   See HPI  :  negative for frequency, dysuria  Integument:  negative for rash and pruritus  Heme:  negative for easy bruising and gum/nose bleeding  Musculoskel: negative for myalgias, back pain and muscle weakness  Neuro: negative for headaches, dizziness, vertigo  Psych:  negative for feelings of anxiety, depression      Objective:     Patient Vitals for the past 8 hrs:   BP Temp Pulse Resp SpO2   11/18/19 1409 115/65 97.4 °F (36.3 °C) 78 16 95 %   11/18/19 0840 100/49 98.6 °F (37 °C) 74 16 95 %     No intake/output data recorded. No intake/output data recorded. PHYSICAL EXAM:  General: WD, WN. Alert, cooperative, no acute distress.    HEENT: NC, Atraumatic. PERRLA, EOMI. Anicteric sclerae. Lungs:  CTA Bilaterally. No Wheezing/Rhonchi/Rales. Heart:  Regular rate and rhythm, No murmur, No Rubs, No Gallops  Abdomen: Soft, non-distended, moderate epigastric tenderness.  +Bowel sounds, no HSM  Extremities: No c/c/e  Neurologic:  Alert and oriented X 3. No acute neurological distress. Psych:   Good insight. Not anxious nor agitated. Data Review     Recent Labs     11/18/19  1131 11/17/19  1726   WBC 7.7 15.5*   HGB 13.7 16.8   HCT 42.9 51.8*    161     Recent Labs     11/18/19  0435 11/17/19  1726    137   K 4.0 3.7    105   CO2 25 26   BUN 16 22*   CREA 0.63* 0.87   * 129*   CA 8.0* 9.2     Recent Labs     11/18/19  1131 11/18/19  0435 11/17/19  1726   SGOT  --  10* 18   AP  --  101 127*   TP  --  5.1* 7.0   ALB  --  3.2* 4.2   GLOB  --  1.9* 2.8   LPSE 1,069*  --  >3,000*     No results for input(s): INR, PTP, APTT, INREXT in the last 72 hours. Imaging studies reviewed      Assessment:   1. Recurrent acute pancreatitis - 2nd occurrence since whipple procedure, CT w/ mild PD dilation, concern for possible stricture. Previous triglyceride and IgG4 normal.   2. Retroperitoneal soft tissue thickening - r/o recurrent malignancy  3. Hx cholangiocarcinoma - s/p pylorus-preserving whipple   4. Weight loss  5.  DM    Patient Active Problem List   Diagnosis Code    Obstructive jaundice K83.1    Common bile duct (CBD) obstruction K83.1    UTI (urinary tract infection) N39.0    Cholangiocarcinoma of biliary tract (HCC) C24.9    Cholangiocarcinoma determined by biopsy of biliary tract (HCC) C24.9    Paroxysmal atrial fibrillation (HCC) I48.0    S/P ablation of atrial fibrillation Z98.890, Z86.79    Essential hypertension I10    Sjogren's disease ([facility] M35.00    Cramps, muscle, general R25.2    Low vitamin D level R79.89    Low vitamin B12 level E53.8    Vomiting R11.10    Controlled type 2 diabetes mellitus without complication, without long-term current use of insulin (HCC) E11.9    Acute pancreatitis K85.90    Leukocytosis D72.829    Pancreatitis K85.90              Plan:   -NPO w/ sips   -Supportive management per primary team  -Check CEA and CA 19-9  -Hem/Onc consult - follows w/ Dr. Jae Reyes [facility]  -IR consult re: consideration of CT guided bx of retroperitoneal mass - d/w IR - concern soft tissue thickening may be related to acute pancreatitis - IR recommending repeat imaging after pancreatitis resolves   -Trend labs   -Discussed with Dr. Wali Sinha   -Will follow  -Thank you kindly for allowing us to participate in the care of this patient

## 2019-11-19 LAB
ALBUMIN SERPL-MCNC: 3.1 G/DL (ref 3.5–5)
ALBUMIN/GLOB SERPL: 1.2 {RATIO} (ref 1.1–2.2)
ALP SERPL-CCNC: 95 U/L (ref 45–117)
ALT SERPL-CCNC: 27 U/L (ref 12–78)
ANION GAP SERPL CALC-SCNC: 5 MMOL/L (ref 5–15)
AST SERPL-CCNC: 10 U/L (ref 15–37)
BASOPHILS # BLD: 0 K/UL (ref 0–0.1)
BASOPHILS NFR BLD: 1 % (ref 0–1)
BILIRUB DIRECT SERPL-MCNC: 0.3 MG/DL (ref 0–0.2)
BILIRUB SERPL-MCNC: 1.7 MG/DL (ref 0.2–1)
BUN SERPL-MCNC: 12 MG/DL (ref 6–20)
BUN/CREAT SERPL: 14 (ref 12–20)
CALCIUM SERPL-MCNC: 8.7 MG/DL (ref 8.5–10.1)
CEA SERPL-MCNC: 0.3 NG/ML
CHLORIDE SERPL-SCNC: 108 MMOL/L (ref 97–108)
CO2 SERPL-SCNC: 27 MMOL/L (ref 21–32)
CREAT SERPL-MCNC: 0.83 MG/DL (ref 0.7–1.3)
DIFFERENTIAL METHOD BLD: ABNORMAL
EOSINOPHIL # BLD: 0.3 K/UL (ref 0–0.4)
EOSINOPHIL NFR BLD: 5 % (ref 0–7)
ERYTHROCYTE [DISTWIDTH] IN BLOOD BY AUTOMATED COUNT: 14.8 % (ref 11.5–14.5)
GLOBULIN SER CALC-MCNC: 2.6 G/DL (ref 2–4)
GLUCOSE BLD STRIP.AUTO-MCNC: 153 MG/DL (ref 65–100)
GLUCOSE BLD STRIP.AUTO-MCNC: 162 MG/DL (ref 65–100)
GLUCOSE BLD STRIP.AUTO-MCNC: 68 MG/DL (ref 65–100)
GLUCOSE BLD STRIP.AUTO-MCNC: 69 MG/DL (ref 65–100)
GLUCOSE BLD STRIP.AUTO-MCNC: 75 MG/DL (ref 65–100)
GLUCOSE BLD STRIP.AUTO-MCNC: 87 MG/DL (ref 65–100)
GLUCOSE BLD STRIP.AUTO-MCNC: 98 MG/DL (ref 65–100)
GLUCOSE SERPL-MCNC: 80 MG/DL (ref 65–100)
HCT VFR BLD AUTO: 42.7 % (ref 36.6–50.3)
HGB BLD-MCNC: 13.5 G/DL (ref 12.1–17)
IMM GRANULOCYTES # BLD AUTO: 0 K/UL (ref 0–0.04)
IMM GRANULOCYTES NFR BLD AUTO: 1 % (ref 0–0.5)
LIPASE SERPL-CCNC: 401 U/L (ref 73–393)
LYMPHOCYTES # BLD: 1 K/UL (ref 0.8–3.5)
LYMPHOCYTES NFR BLD: 17 % (ref 12–49)
MCH RBC QN AUTO: 29.2 PG (ref 26–34)
MCHC RBC AUTO-ENTMCNC: 31.6 G/DL (ref 30–36.5)
MCV RBC AUTO: 92.2 FL (ref 80–99)
MONOCYTES # BLD: 0.5 K/UL (ref 0–1)
MONOCYTES NFR BLD: 8 % (ref 5–13)
NEUTS SEG # BLD: 3.9 K/UL (ref 1.8–8)
NEUTS SEG NFR BLD: 68 % (ref 32–75)
NRBC # BLD: 0 K/UL (ref 0–0.01)
NRBC BLD-RTO: 0 PER 100 WBC
PLATELET # BLD AUTO: 146 K/UL (ref 150–400)
PMV BLD AUTO: 11.1 FL (ref 8.9–12.9)
POTASSIUM SERPL-SCNC: 4.3 MMOL/L (ref 3.5–5.1)
PROT SERPL-MCNC: 5.7 G/DL (ref 6.4–8.2)
RBC # BLD AUTO: 4.63 M/UL (ref 4.1–5.7)
SERVICE CMNT-IMP: ABNORMAL
SERVICE CMNT-IMP: ABNORMAL
SERVICE CMNT-IMP: NORMAL
SODIUM SERPL-SCNC: 140 MMOL/L (ref 136–145)
TRIGL SERPL-MCNC: 48 MG/DL (ref ?–150)
WBC # BLD AUTO: 5.8 K/UL (ref 4.1–11.1)

## 2019-11-19 PROCEDURE — 83690 ASSAY OF LIPASE: CPT

## 2019-11-19 PROCEDURE — 74011000250 HC RX REV CODE- 250: Performed by: HOSPITALIST

## 2019-11-19 PROCEDURE — 86301 IMMUNOASSAY TUMOR CA 19-9: CPT

## 2019-11-19 PROCEDURE — 80048 BASIC METABOLIC PNL TOTAL CA: CPT

## 2019-11-19 PROCEDURE — 74011636637 HC RX REV CODE- 636/637: Performed by: INTERNAL MEDICINE

## 2019-11-19 PROCEDURE — 85025 COMPLETE CBC W/AUTO DIFF WBC: CPT

## 2019-11-19 PROCEDURE — 74011250636 HC RX REV CODE- 250/636: Performed by: INTERNAL MEDICINE

## 2019-11-19 PROCEDURE — 82962 GLUCOSE BLOOD TEST: CPT

## 2019-11-19 PROCEDURE — 36415 COLL VENOUS BLD VENIPUNCTURE: CPT

## 2019-11-19 PROCEDURE — 82378 CARCINOEMBRYONIC ANTIGEN: CPT

## 2019-11-19 PROCEDURE — 65270000029 HC RM PRIVATE

## 2019-11-19 PROCEDURE — 84478 ASSAY OF TRIGLYCERIDES: CPT

## 2019-11-19 PROCEDURE — 74011250637 HC RX REV CODE- 250/637: Performed by: HOSPITALIST

## 2019-11-19 PROCEDURE — 80076 HEPATIC FUNCTION PANEL: CPT

## 2019-11-19 PROCEDURE — 74011250636 HC RX REV CODE- 250/636: Performed by: HOSPITALIST

## 2019-11-19 RX ORDER — MAGNESIUM SULFATE 100 %
4 CRYSTALS MISCELLANEOUS AS NEEDED
Status: DISCONTINUED | OUTPATIENT
Start: 2019-11-19 | End: 2019-11-20 | Stop reason: HOSPADM

## 2019-11-19 RX ORDER — DEXTROSE MONOHYDRATE 100 MG/ML
0-250 INJECTION, SOLUTION INTRAVENOUS AS NEEDED
Status: DISCONTINUED | OUTPATIENT
Start: 2019-11-19 | End: 2019-11-20 | Stop reason: HOSPADM

## 2019-11-19 RX ORDER — INSULIN LISPRO 100 [IU]/ML
INJECTION, SOLUTION INTRAVENOUS; SUBCUTANEOUS
Status: DISCONTINUED | OUTPATIENT
Start: 2019-11-19 | End: 2019-11-20 | Stop reason: HOSPADM

## 2019-11-19 RX ADMIN — INSULIN LISPRO 2 UNITS: 100 INJECTION, SOLUTION INTRAVENOUS; SUBCUTANEOUS at 16:44

## 2019-11-19 RX ADMIN — ENOXAPARIN SODIUM 40 MG: 40 INJECTION SUBCUTANEOUS at 22:33

## 2019-11-19 RX ADMIN — ONDANSETRON 4 MG: 2 INJECTION INTRAMUSCULAR; INTRAVENOUS at 22:38

## 2019-11-19 RX ADMIN — HYDROMORPHONE HYDROCHLORIDE 1 MG: 1 INJECTION, SOLUTION INTRAMUSCULAR; INTRAVENOUS; SUBCUTANEOUS at 06:40

## 2019-11-19 RX ADMIN — SUCRALFATE 1 G: 1 TABLET ORAL at 22:33

## 2019-11-19 RX ADMIN — Medication 10 ML: at 07:18

## 2019-11-19 RX ADMIN — ONDANSETRON 4 MG: 2 INJECTION INTRAMUSCULAR; INTRAVENOUS at 06:40

## 2019-11-19 RX ADMIN — Medication 10 ML: at 06:00

## 2019-11-19 RX ADMIN — FAMOTIDINE 20 MG: 10 INJECTION, SOLUTION INTRAVENOUS at 08:44

## 2019-11-19 RX ADMIN — SUCRALFATE 1 G: 1 TABLET ORAL at 06:39

## 2019-11-19 RX ADMIN — HYDROMORPHONE HYDROCHLORIDE 1 MG: 1 INJECTION, SOLUTION INTRAMUSCULAR; INTRAVENOUS; SUBCUTANEOUS at 22:38

## 2019-11-19 RX ADMIN — SUCRALFATE 1 G: 1 TABLET ORAL at 16:45

## 2019-11-19 RX ADMIN — SODIUM CHLORIDE 150 ML/HR: 900 INJECTION, SOLUTION INTRAVENOUS at 13:16

## 2019-11-19 RX ADMIN — Medication 10 ML: at 16:44

## 2019-11-19 RX ADMIN — TAMSULOSIN HYDROCHLORIDE 0.4 MG: 0.4 CAPSULE ORAL at 22:33

## 2019-11-19 RX ADMIN — Medication 10 ML: at 22:33

## 2019-11-19 RX ADMIN — FAMOTIDINE 20 MG: 10 INJECTION, SOLUTION INTRAVENOUS at 22:33

## 2019-11-19 RX ADMIN — SUCRALFATE 1 G: 1 TABLET ORAL at 10:53

## 2019-11-19 RX ADMIN — LISINOPRIL 40 MG: 20 TABLET ORAL at 22:32

## 2019-11-19 NOTE — PROGRESS NOTES
NUTRITION COMPLETE ASSESSMENT    RECOMMENDATIONS:     1. RD has added Ensure Clear while on clear liquid diet    2. Advance diet to consistent carb as tolerated     Interventions/Plan:   Food/Nutrient Delivery:   Ensure Clear until diet advances    Assessment:   Reason for Assessment:   [x]BPA/MST Referral     Diet:  Clears  Supplements: added Ensure Clear BID  Nutritionally Significant Medications: [x] Reviewed & Includes: pepcid, zofran  Meal Intake: No data found. Pre-Hospitalization:  Usual Appetite: Excellent  Diet at Home: (regular)  Vitamins/Supplements: No    Current Hospitalization:   Fluid Restriction:    Appetite: Poor  PO Ability: Independent Average po intake:   Average supplements intake:        Subjective:  Sharp abdominal pain started just PTA    Objective:  Pt is a 62year old male admitted on 11/17 with severe abdominal pain. Pt has h/o cholangiocarcinoma s/p Whipple procedure in 10/2017, dx'ed with his second bought of pancreatitis since this procedure was done 2 years ago. Per notes he does have a retroperitoneal mass that needs a bx. Pt also has h/o DM type 2. He does feel some improvement today since IV hydration and pain meds. Can hopefully advance diet over the next 24 hours. He is not currently at nutritional risk, but he certainly has a complex medical hx which puts him at higher risk for developing protein calorie malnutrition. Based on the weight hx below, he has lost about 11 pounds in the past 4 months. His current weight is still well within normal range for his height, but certainly don't want to see him continue on this weight loss trend. Estimated Nutrition Needs:   Kcals/day: (~ 7300-4290 kcals)  Protein: (1.1-1.3 gm pro/kg)  Fluid: (~ 1 ml/kcal)  Based On: Chaka Acevedo(x factor of 1.3-1.4)  Weight Used: Actual wt    Pt expected to meet estimated nutrient needs:  []   Yes     []  No [x] Unable to predict at this time  Nutrition Diagnosis:   1.  Altered GI function related to abdominal pain as evidenced by dx of pancreatitis, >> lipase      Goals:     Pt will tolerate diet advancement to solids over the next 2-4 days     Monitoring & Evaluation:    - Total energy intake, Oral fluids amount   - Weight/weight change, GI profile   -      Previous Nutrition Goals Met:   N/A  Previous Recommendations:    N/A    Education & Discharge Needs:   [x] None Identified   [] Identified and addressed    [x] Participated in care plan, discharge planning, and/or interdisciplinary rounds        Cultural, Restorationism and ethnic food preferences identified: None    Skin Integrity: [x]Intact  []Other  Edema: [x]None []Other  Last BM:  11/17/19  Food Allergies: [x]None []Other  Diet Restrictions: Cultural/Caodaism Preference(s): None     Anthropometrics:    Weight Source: Standing scale (comment) 78.5 kg  Height: 5' 8\" (172.7 cm),    Body mass index is 26.3 kg/m².      IBW : (~ 155 pounds),      Wt Readings from Last 8 Encounters:   11/17/19 78.5 kg (173 lb)   11/14/19 78.5 kg (173 lb 1 oz)   11/07/19 80 kg (176 lb 5.9 oz)   11/05/19 80 kg (176 lb 5.9 oz)   09/03/19 83.9 kg (185 lb)   08/28/19 84.1 kg (185 lb 6.4 oz)   08/13/19 83.5 kg (184 lb)   07/25/19 83.5 kg (184 lb)       Labs:    Lab Results   Component Value Date/Time    Sodium 140 11/19/2019 02:28 AM    Potassium 4.3 11/19/2019 02:28 AM    Chloride 108 11/19/2019 02:28 AM    CO2 27 11/19/2019 02:28 AM    Glucose 80 11/19/2019 02:28 AM    BUN 12 11/19/2019 02:28 AM    Creatinine 0.83 11/19/2019 02:28 AM    Calcium 8.7 11/19/2019 02:28 AM    Magnesium 2.3 08/18/2018 04:20 AM    Phosphorus 4.0 08/18/2018 04:20 AM    Albumin 3.2 (L) 11/18/2019 04:35 AM     Lab Results   Component Value Date/Time    Hemoglobin A1c 6.6 (H) 10/30/2018 09:39 AM    Hemoglobin A1c (POC) 6.4 09/03/2019 03:50 AM       Le Sanchez, [address] [place] [address], [address] [place] [address]

## 2019-11-19 NOTE — DIABETES MGMT
[facility]    [facility] Progress Note    Recommendations/ Comments: Review for hypoglycemia this morning. BS <70 mg/dl and was treated per protocol. Diet advanced. Will watch for trends. Current hospital DM medication: none    Chart reviewed on [address]. Patient is a 62 y.o. male with known  Type 2 Diabetes on oral agents (dual therapy): metformin  mg bid (generic), jardiance 25 mg daily at home. A1c:   Lab Results   Component Value Date/Time    Hemoglobin A1c 6.6 (H) 10/30/2018 09:39 AM    Hemoglobin A1c 6.5 (H) 06/07/2018 11:31 AM       Recent Glucose Results:   Lab Results   Component Value Date/Time    GLU 80 11/19/2019 02:28 AM    GLUCPOC 87 11/19/2019 11:26 AM    GLUCPOC 98 11/19/2019 06:58 AM    GLUCPOC 69 11/19/2019 06:25 AM        Lab Results   Component Value Date/Time    Creatinine 0.83 11/19/2019 02:28 AM     Estimated Creatinine Clearance: 88.1 mL/min (based on SCr of 0.83 mg/dL). Active Orders   Diet    DIET CLEAR LIQUID        PO intake: No data found. Will continue to follow as needed.     Thank you          Time spent: 3 min

## 2019-11-19 NOTE — PROGRESS NOTES
Catrachita Nelson [address]  [address]   [phone]                GI PROGRESS NOTE  DOTTY Carranza-BC  Work Cell: [phone]      NAME:   Vira Olguin   :    1955   MRN:    [id number]     Assessment/Plan   1. Recurrent acute pancreatitis - 2nd occurrence since whipple procedure, CT w/ mild PD dilation, concern for possible stricture. Triglyceride and IgG4 normal. Symptoms improving.   - Advance to clear liquids  - Supportive management per primary team  - Hepatic panel and lipase today  - Trend labs     2. Retroperitoneal soft tissue thickening - concern for possible inflammatory phlegmon, r/o recurrent malignancy  - CA 19-9 pending   - Discussed with IR - plan to repeat CT in 4-5 weeks once pancreatitis resolved to re-assess  - Appreciate surgery input     3. Hx cholangiocarcinoma - s/p pylorus-preserving whipple   - Hematology following   4. Weight loss  5. DM     Patient Active Problem List   Diagnosis Code    Obstructive jaundice K83.1    Common bile duct (CBD) obstruction K83.1    UTI (urinary tract infection) N39.0    Cholangiocarcinoma of biliary tract (HCC) C24.9    Cholangiocarcinoma determined by biopsy of biliary tract (HCC) C24.9    Paroxysmal atrial fibrillation (HCC) I48.0    S/P ablation of atrial fibrillation Z98.890, Z86.79    Essential hypertension I10    Sjogren's disease ([facility] [address] M35.00    Cramps, muscle, general R25.2    Low vitamin D level R79.89    Low vitamin B12 level E53.8    Vomiting R11.10    Controlled type 2 diabetes mellitus without complication, without long-term current use of insulin (HCC) E11.9    Acute pancreatitis K85.90    Leukocytosis D72.829    Pancreatitis K85.90       Subjective:     Feeling better. Reports minimal upper abdominal discomfort. Denies any n/v.     Objective:     VITALS:   Last 24hrs VS reviewed since prior hospitalist progress note.  Most recent are:  Visit Vitals  /83 (BP 1 Location: Right arm, BP Patient Position: At rest)   Pulse 65   Temp 98.6 °F (37 °C)   Resp 16   Ht 5' 8\" (1.727 m)   Wt 78.5 kg (173 lb)   SpO2 97%   BMI 26.30 kg/m²     No intake or output data in the 24 hours ending 11/19/19 0951     PHYSICAL EXAM:  General   well developed, alert, in no acute distress  EENT  Normocephalic, Atraumatic, PERRLA, EOMI, sclera clear  Respiratory   Clear To Auscultation bilaterally - no wheezes, rales, rhonchi, or crackles  Cardiology  Regular Rate and Rythmn  - no murmurs, rubs or gallops  Abdominal  Soft, non-distended, mild epigastric tenderness, positive bowel sounds, no hepatosplenomegaly, no palpable mass  Neurological  No focal neurological deficits noted  Psychological  Oriented x 3. Normal affect. Lab Data   Recent Results (from the past 12 hour(s))   CBC WITH AUTOMATED DIFF    Collection Time: 11/19/19  2:28 AM   Result Value Ref Range    WBC 5.8 4.1 - 11.1 K/uL    RBC 4.63 4.10 - 5.70 M/uL    HGB 13.5 12.1 - 17.0 g/dL    HCT 42.7 36.6 - 50.3 %    MCV 92.2 80.0 - 99.0 FL    MCH 29.2 26.0 - 34.0 PG    MCHC 31.6 30.0 - 36.5 g/dL    RDW 14.8 (H) 11.5 - 14.5 %    PLATELET 180 (L) 204 - 400 K/uL    MPV 11.1 8.9 - 12.9 FL    NRBC 0.0 0  WBC    ABSOLUTE NRBC 0.00 0.00 - 0.01 K/uL    NEUTROPHILS 68 32 - 75 %    LYMPHOCYTES 17 12 - 49 %    MONOCYTES 8 5 - 13 %    EOSINOPHILS 5 0 - 7 %    BASOPHILS 1 0 - 1 %    IMMATURE GRANULOCYTES 1 (H) 0.0 - 0.5 %    ABS. NEUTROPHILS 3.9 1.8 - 8.0 K/UL    ABS. LYMPHOCYTES 1.0 0.8 - 3.5 K/UL    ABS. MONOCYTES 0.5 0.0 - 1.0 K/UL    ABS. EOSINOPHILS 0.3 0.0 - 0.4 K/UL    ABS. BASOPHILS 0.0 0.0 - 0.1 K/UL    ABS. IMM.  GRANS. 0.0 0.00 - 0.04 K/UL    DF AUTOMATED     METABOLIC PANEL, BASIC    Collection Time: 11/19/19  2:28 AM   Result Value Ref Range    Sodium 140 136 - 145 mmol/L    Potassium 4.3 3.5 - 5.1 mmol/L    Chloride 108 97 - 108 mmol/L    CO2 27 21 - 32 mmol/L    Anion gap 5 5 - 15 mmol/L    Glucose 80 65 - 100 mg/dL    BUN 12 6 - 20 MG/DL    Creatinine 0.83 0.70 - 1.30 MG/DL    BUN/Creatinine ratio 14 12 - 20      GFR est AA >60 >60 ml/min/1.73m2    GFR est non-AA >60 >60 ml/min/1.73m2    Calcium 8.7 8.5 - 10.1 MG/DL   CEA    Collection Time: 11/19/19  2:28 AM   Result Value Ref Range    CEA 0.3 ng/mL   TRIGLYCERIDE    Collection Time: 11/19/19  2:45 AM   Result Value Ref Range    Triglyceride 48 <150 MG/DL   GLUCOSE, POC    Collection Time: 11/19/19  6:22 AM   Result Value Ref Range    Glucose (POC) 75 65 - 100 mg/dL    Performed by Braeden Polite    GLUCOSE, POC    Collection Time: 11/19/19  6:23 AM   Result Value Ref Range    Glucose (POC) 68 65 - 100 mg/dL    Performed by Braeden Polite    GLUCOSE, POC    Collection Time: 11/19/19  6:25 AM   Result Value Ref Range    Glucose (POC) 69 65 - 100 mg/dL    Performed by Braeden Polite    GLUCOSE, POC    Collection Time: 11/19/19  6:58 AM   Result Value Ref Range    Glucose (POC) 98 65 - 100 mg/dL    Performed by Harmony RODRIGUEZ          Medications: Reviewed    PMH/SH reviewed - no change compared to H&P  Mid-Level Provider: Amina Cowan NP   Date/Time:  11/19/2019

## 2019-11-19 NOTE — PROGRESS NOTES
[address] [facility]      Subjective     No acute events overnight. Patient states he has been feeling better again today. No n/v today. Pain improving. Objective     Patient Vitals for the past 24 hrs:   Temp Pulse Resp BP SpO2   11/19/19 1436 97.6 °F (36.4 °C) 62 16 112/55 97 %   11/19/19 0843 98.6 °F (37 °C) 65 16 133/83 97 %   11/19/19 0234 97.4 °F (36.3 °C) 66 16 115/67    11/18/19 2004 98.4 °F (36.9 °C) 62 16 114/69            PE  GEN - Awake, alert, communicating appropriately. NAD  Abd - soft, NT, ND      Labs  Recent Results (from the past 24 hour(s))   GLUCOSE, POC    Collection Time: 11/18/19  4:57 PM   Result Value Ref Range    Glucose (POC) 85 65 - 100 mg/dL    Performed by Brian LOPEZ (juan)    CBC WITH AUTOMATED DIFF    Collection Time: 11/19/19  2:28 AM   Result Value Ref Range    WBC 5.8 4.1 - 11.1 K/uL    RBC 4.63 4.10 - 5.70 M/uL    HGB 13.5 12.1 - 17.0 g/dL    HCT 42.7 36.6 - 50.3 %    MCV 92.2 80.0 - 99.0 FL    MCH 29.2 26.0 - 34.0 PG    MCHC 31.6 30.0 - 36.5 g/dL    RDW 14.8 (H) 11.5 - 14.5 %    PLATELET 611 (L) 672 - 400 K/uL    MPV 11.1 8.9 - 12.9 FL    NRBC 0.0 0  WBC    ABSOLUTE NRBC 0.00 0.00 - 0.01 K/uL    NEUTROPHILS 68 32 - 75 %    LYMPHOCYTES 17 12 - 49 %    MONOCYTES 8 5 - 13 %    EOSINOPHILS 5 0 - 7 %    BASOPHILS 1 0 - 1 %    IMMATURE GRANULOCYTES 1 (H) 0.0 - 0.5 %    ABS. NEUTROPHILS 3.9 1.8 - 8.0 K/UL    ABS. LYMPHOCYTES 1.0 0.8 - 3.5 K/UL    ABS. MONOCYTES 0.5 0.0 - 1.0 K/UL    ABS. EOSINOPHILS 0.3 0.0 - 0.4 K/UL    ABS. BASOPHILS 0.0 0.0 - 0.1 K/UL    ABS. IMM.  GRANS. 0.0 0.00 - 0.04 K/UL    DF AUTOMATED     METABOLIC PANEL, BASIC    Collection Time: 11/19/19  2:28 AM   Result Value Ref Range    Sodium 140 136 - 145 mmol/L    Potassium 4.3 3.5 - 5.1 mmol/L    Chloride 108 97 - 108 mmol/L    CO2 27 21 - 32 mmol/L    Anion gap 5 5 - 15 mmol/L    Glucose 80 65 - 100 mg/dL    BUN 12 6 - 20 MG/DL Creatinine 0.83 0.70 - 1.30 MG/DL    BUN/Creatinine ratio 14 12 - 20      GFR est AA >60 >60 ml/min/1.73m2    GFR est non-AA >60 >60 ml/min/1.73m2    Calcium 8.7 8.5 - 10.1 MG/DL   CEA    Collection Time: 11/19/19  2:28 AM   Result Value Ref Range    CEA 0.3 ng/mL   TRIGLYCERIDE    Collection Time: 11/19/19  2:45 AM   Result Value Ref Range    Triglyceride 48 <150 MG/DL   GLUCOSE, POC    Collection Time: 11/19/19  6:22 AM   Result Value Ref Range    Glucose (POC) 75 65 - 100 mg/dL    Performed by Synetta Labor    GLUCOSE, POC    Collection Time: 11/19/19  6:23 AM   Result Value Ref Range    Glucose (POC) 68 65 - 100 mg/dL    Performed by Synetta Labor    GLUCOSE, POC    Collection Time: 11/19/19  6:25 AM   Result Value Ref Range    Glucose (POC) 69 65 - 100 mg/dL    Performed by Synetta Labor    GLUCOSE, POC    Collection Time: 11/19/19  6:58 AM   Result Value Ref Range    Glucose (POC) 98 65 - 100 mg/dL    Performed by Colin RODRIGUEZ    HEPATIC FUNCTION PANEL    Collection Time: 11/19/19 10:49 AM   Result Value Ref Range    Protein, total 5.7 (L) 6.4 - 8.2 g/dL    Albumin 3.1 (L) 3.5 - 5.0 g/dL    Globulin 2.6 2.0 - 4.0 g/dL    A-G Ratio 1.2 1.1 - 2.2      Bilirubin, total 1.7 (H) 0.2 - 1.0 MG/DL    Bilirubin, direct 0.3 (H) 0.0 - 0.2 MG/DL    Alk. phosphatase 95 45 - 117 U/L    AST (SGOT) 10 (L) 15 - 37 U/L    ALT (SGPT) 27 12 - 78 U/L   LIPASE    Collection Time: 11/19/19 10:49 AM   Result Value Ref Range    Lipase 401 (H) 73 - 393 U/L   GLUCOSE, POC    Collection Time: 11/19/19 11:26 AM   Result Value Ref Range    Glucose (POC) 87 65 - 100 mg/dL    Performed by [address] [place] [address] [address] E, POC    Collection Time: 11/19/19  4:23 PM   Result Value Ref Range    Glucose (POC) 153 (H) 65 - 100 mg/dL    Performed by Eli Headley is a 62 y. o.yr old male with history of pT3N1 distal cholangiocarcinoma s/p pylorus preserving whipple procedure 10/2017.   He now is admitted with pancreatitis. There is some concern of possible recurrent disease due to increase para-aortic soft tissue on recent CT. Plan     - He has had some increased n/v over the past month. This was an issue post-operatively for a prolonged period with bile reflux but seemed to have improved. If this continues, it may be reasonable to consider surgical conversion to a eren-en Y gastrojejunostomy to help prevent bile reflux. I would wait until his pancreatitis is resolved and ensure he does not have recurrence first.  Would recommend a PET/CT and/or a biopsy of this soft tissue abnormality in a few weeks once his acute inflammation from the pancreatitis resolves. - Diet per primary team.   - Following. No acute indication for surgical intervention at this time.      Lesley Ayala MD  11/19/2019  4:55 PM

## 2019-11-19 NOTE — PROGRESS NOTES
Problem: Pain  Goal: *Control of Pain  Outcome: Progressing Towards Goal  Goal: *PALLIATIVE CARE:  Alleviation of Pain  Outcome: Progressing Towards Goal     Problem: Patient Education: Go to Patient Education Activity  Goal: Patient/Family Education  Outcome: Progressing Towards Goal     Problem: Falls - Risk of  Goal: *Absence of Falls  Description  Document Cinthia Goodwin Fall Risk and appropriate interventions in the flowsheet.   Outcome: Progressing Towards Goal  Note:   Fall Risk Interventions:            Medication Interventions: Patient to call before getting OOB, Teach patient to arise slowly                   Problem: Patient Education: Go to Patient Education Activity  Goal: Patient/Family Education  Outcome: Progressing Towards Goal

## 2019-11-19 NOTE — PROGRESS NOTES
[address] [facility]  at [address]  [place], [address] [facility]  [phone]       Follow-up Note      Patient: Tim Babin MRN: [id number]  SSN: [id number]    YOB: 1955  Age: 62 y.o. Sex: male      Diagnosis:     1. 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 Pancreatitis  Treatment:     1. S/P Pancreatoduodenectomy on  10/06/2017  2. Adjuvant monotherapy with Capecitabine - s/p 6 cycles   (12/4/2017 - 05/2018)    Subjective:      Tim Babin is a 62 y.o. male with a diagnosis of extrahepatic cholangiocarcinoma. He presented to the ED in August 2017 with obstructive jaundice. He was found to have an obstructive lesion in the dital bile duct. The CT showed marked intrahepatic biliary dilation and common bile duct dilation to the level of the mid common bile duct, which ws markedly narrowed. He underwent a stenting procedure followed by spyglass cholangiogram. The brushings revealed a diagnosis of cholangiocarcinoma. He underwent a Whipple procedure on 10/06/2017. He completed 6 cycles of adjuvant Xeloda. Mr. Brit Henley was admitted to the hospital with severe abdominal pain. The pain came on very suddenly in the bathroom it felt like a hot poker sticking straight through him. This is the worst pain he had ever had including when he had a kidney stone. Because the pain was so intense he ended up coming to the emergency room and was admitted. CT scan was done which shows it to be fairly stable with no evidence of progression of his cancer. The CT suggest that we are dealing with pancreatitis. The patient is doing well with 1 mg of Dilaudid IV every 6 hours. Hopefully will build to get him switched over to oral medications and home fairly soon.     11/19/2019  The patient seems to be doing very well his pain is under control there are advancing his diet will probably be discharged in the next day or 2 we will plan on circling around back to see him again if there are any new problems. Dr. Shital Pimentel is well aware that he is been in the hospital and will follow him as an outpatient  Review of Systems:    Constitutional: negative  Eyes: negative  Ears, Nose, Mouth, Throat, and Face: negative  Respiratory: negative  Cardiovascular: negative  Gastrointestinal: negative  Genitourinary:negative  Integument/Breast: negative  Hematologic/Lymphatic: negative  Musculoskeletal:negative  Neurological: negative      Past Medical History:   Diagnosis Date    Arrhythmia     Previous a.fib, ablation, NSR now; NO LONGER FOLLOWED BY CARDIOLOGIST.  Autoimmune disease ([facility] [address]     SJOGREN'S    Cancer ([facility] [address]     COMMON BILE DUCT, ADENOCARCINOMA    Diabetes ([facility] [address]     Family history of skin cancer     Hypertension     Sepsis ([facility] [address] 2017    STENTING TO BILE DUCT    Status post chemotherapy     Stroke [facility]) 2008    brain aneurysm - no deficits    Sun-damaged skin     Sunburn, blistering      Past Surgical History:   Procedure Laterality Date    ABDOMEN SURGERY PROC UNLISTED  10/2017    [place]     HX GI      COLONOSCOPY, POLYPS (BENIGN)    HX GI  10/06/2017    Viktor [facility]     HX HEART CATHETERIZATION  2005    Ablation     HX ORTHOPAEDIC  2000    Spinal fusion W/ HARDWARE    HX OTHER SURGICAL  11/07/2017    [place] Cath, Dr. Jeremy Woody  2017    PORTACATH - then removed    NEUROLOGICAL PROCEDURE UNLISTED  2005    Shasha Batter hole washout from cerebral hemorrhage      Family History   Problem Relation Age of Onset    Cancer Father         Breast and Colon    Cancer Mother         LEUKEMIA    No Known Problems Sister     No Known Problems Brother     No Known Problems Sister     Anesth Problems Neg Hx      Social History     Tobacco Use    Smoking status: Never Smoker    Smokeless tobacco: Never Used   Substance Use Topics    Alcohol use:  Yes Comment: very rare      Prior to Admission medications    Medication Sig Start Date End Date Taking? Authorizing Provider   sucralfate (CARAFATE) 1 gram tablet Take 1 g by mouth four (4) times daily. Yes Provider, Historical   omeprazole (PRILOSEC) 40 mg capsule Take 1 Cap by mouth daily. 11/5/19  Yes Barbara Bejarano MD   empagliflozin (JARDIANCE) 25 mg tablet Take 1 Tab by mouth daily. 5/30/19  Yes Shelby Ames MD   famotidine (PEPCID) 20 mg tablet Take 20 mg by mouth daily. 2/22/11  Yes Provider, Historical   sildenafil citrate (VIAGRA) 50 mg tablet Take 1 Tab by mouth as needed (ED). 5/6/19  Yes Silas Raya III, DO   ramipril (ALTACE) 10 mg capsule Take 1 Cap by mouth nightly. 1/4/19  Yes Silas Raya III, DO   calcium carbonate (TUMS) 200 mg calcium (500 mg) chew Take 2 Tabs by mouth three (3) times daily as needed (indigestion). Yes Flynn Mohan MD   ondansetron (ZOFRAN ODT) 4 mg disintegrating tablet Take 1 Tab by mouth every eight (8) hours as needed for Nausea. 11/7/19   Freddie Reyes MD   metFORMIN ER (GLUCOPHAGE XR) 500 mg tablet TAKE 1 TABLET BY MOUTH TWICE A DAY 9/18/19   Silas Raya III,    metFORMIN ER (GLUCOPHAGE XR) 500 mg tablet Take 1 Tab by mouth two (2) times a day. 9/18/19   Silas Raya III, DO   ondansetron (ZOFRAN ODT) 8 mg disintegrating tablet DISSOLVE 1 TAB BY MOUTH EVERY 8 HOURS AS NEEDED FOR NAUSEA. 5/30/19   Provider, Historical   [place] Bounds METER misc USE AS DIRECTED 5/30/19   Provider, Historical   Celio Huerta LANCETS 30 gauge misc USE AS DIRECTED 5/30/19   Provider, Historical   Blood-Glucose Meter monitoring kit 1 preferred brand glucometer for checking home glucose 5/30/19   Shelby Ames MD   lancets misc Use preferred brand; Check glucose 1 time daily, Diagnosis E11.65 5/30/19   Shebly Ames MD   glucose blood VI test strips (PHARMACIST CHOICE) strip Use preferred brand;  Check glucose 1 times daily, Diagnosis E11.65 5/30/19   Sandy Zhong MD   gabapentin (NEURONTIN) 300 mg capsule TAKE 3 CAPS BY MOUTH TWO (2) TIMES A DAY. 5/15/19   Tia OGDEN III, DO   aspirin delayed-release 81 mg tablet Take 81 mg by mouth daily. Provider, Historical   promethazine (PHENERGAN) 25 mg tablet Take 1 Tab by mouth every six (6) hours as needed for Nausea. 5/6/19   Tia OGDEN III, DO   tamsulosin (FLOMAX) 0.4 mg capsule TAKE ONE CAPSULE BY MOUTH EVERY EVENING 2/28/19   Provider, Historical   cyanocobalamin (VITAMIN B12) 1,000 mcg/mL injection INJECT CONTENTS OF ONE VIAL INTRAMUSCULARLY EVERY OTHER WEEK 9/20/18   Silas Raya III, DO   alpha-D-galactosidase (BEANO PO) Take 1 Tab by mouth two (2) times a day. Other, MD Flynn   pantoprazole (PROTONIX) 40 mg tablet TAKE 1 BY MOUTH EVERY MORNING FOR 30 DAYS 7/22/18   Provider, Historical   cholecalciferol, vitamin D3, (VITAMIN D3 PO) Take 1 Tab by mouth daily. Provider, Historical   cetirizine (ZYRTEC) 10 mg tablet Take 10 mg by mouth nightly. Provider, Historical          Allergies   Allergen Reactions    Shellfish Derived Anaphylaxis     Soft shell crabs    Pcn [Penicillins] Other (comments)     Pt stated \"always been told PCN\"           Objective:     Vitals:    11/18/19 1409 11/18/19 2004 11/19/19 0234 11/19/19 0843   BP: 115/65 114/69 115/67 133/83   Pulse: 78 62 66 65   Resp: 16 16 16 16   Temp: 97.4 °F (36.3 °C) 98.4 °F (36.9 °C) 97.4 °F (36.3 °C) 98.6 °F (37 °C)   SpO2: 95%   97%   Weight:       Height:            Pain Scale:0 /10          Physical Exam:    GENERAL: alert, cooperative, no distress, appears stated age  EYE: negative  LYMPHATIC: Cervical, supraclavicular, and axillary nodes normal.   THROAT & NECK: normal and no erythema or exudates noted.    LUNG: clear to auscultation bilaterally  HEART: regular rate and rhythm  ABDOMEN: soft, non-tender at the present  EXTREMITIES:  no edema  SKIN: Normal.  NEUROLOGIC: negative        CT Results (most recent):  Results from Hospital Encounter encounter on 11/17/19   CT ABD PELV W CONT    Narrative EXAM: CT ABD PELV W CONT    INDICATION: hx of whipple, recent EGD now with severe onset abd pain. Lipase >  3,000    COMPARISON: 11/7/2019     CONTRAST: 100 mL of Isovue-370. TECHNIQUE:   Following the uneventful intravenous administration of contrast, thin axial  images were obtained through the abdomen and pelvis. Coronal and sagittal  reconstructions were generated. Oral contrast was not administered. CT dose  reduction was achieved through use of a standardized protocol tailored for this  examination and automatic exposure control for dose modulation. FINDINGS:   LUNG BASES: There is mild bibasilar atelectasis. INCIDENTALLY IMAGED HEART AND MEDIASTINUM: Unremarkable. LIVER: No mass or biliary dilatation. Pneumobilia is related to the prior  choledocho jejunal anastomosis. GALLBLADDER: Status post cholecystectomy. SPLEEN: No mass. PANCREAS: Status post Whipple procedure with a pancreatic jejunal anastomosis. There is increased mild pancreatic ductal dilatation. There is peripancreatic  stranding. This extends into the retroperitoneum and surrounds the aortic branch  vessels. This also extends of the right upper quadrant is seen adjacent to  multiple loops of bowel. ADRENALS: Unremarkable. KIDNEYS: There is a 2.1 cm cyst in the anterior left kidney. There is a 5 mm  cyst in the superior pole left kidney. The kidneys are otherwise unremarkable. No hydronephrosis. STOMACH: Status post partial gastrectomy. SMALL BOWEL: No dilatation or wall thickening. COLON: No dilatation or wall thickening. Multiple colonic diverticula are  present. APPENDIX: Unremarkable. PERITONEUM: No ascites or pneumoperitoneum. RETROPERITONEUM: There is unchanged mild soft tissue thickening surrounding the  SMA origin. REPRODUCTIVE ORGANS: Unremarkable.   URINARY BLADDER: No mass or calculus. BONES: No destructive bone lesion. Chronic deformity of the posterior right  iliac bone related to prior bone graft harvest. Status post L5-S1 posterior  fusion. ADDITIONAL COMMENTS: N/A      Impression IMPRESSION:    1. Status post Whipple procedure. 2. Increased mild pancreatic ductal dilatation. Significant peripancreatic  stranding extending into the right upper quadrant and retroperitoneum. Findings  are likely related to acute pancreatitis. 3. Unchanged mild soft tissue thickening is seen in the retroperitoneum  surrounding the SMA. No evidence of metastatic disease. Assessment:     1. Extrahepatic cholangiocarcinoma:    T3 N1 (1 of 12 LN +ve)  Invasion into pancreas  R0 resection  Presently still in CR    2. pancreatitis  > S/P Pancreatoduodenectomy on  10/06/2017    Completed adjuvant treatment with single agent Capecitabine - s/p 6 cycles (12/4/2017 - 05/2018). CT abd/pelvis 8/21/2019 - no evidence of recurrent disease    Asymptomatic  In remission  Continue surveillance      Plan:       > Labs CBC, CMP and CA 19-9 pending  > CT Abd Pelv in 6 months as out pt  > Follow-up apt has been set for February. Signed by: Karla Franks MD                     November 19, 2019          CC. Francisco Ambriz MD  CC. Kwaku Fernández MD  CC. Anai Hill MD  CC.  Manjula Ozuna MD

## 2019-11-19 NOTE — PROGRESS NOTES
Hospitalist Progress Note  Ricardo Rivers MD  Answering service: [phone] OR [phone] from in house phone        Date of Service:  2019  NAME:  Mikala Alonso  :  1955  MRN:  [id number]      Admission Summary:   61 yo male with a history of cholangiocarcinoma s/p whipple in 2017 is admitted for treatment of pancreatitis. Interval history / Subjective:   Pt is seen and examined at bedside. He is feeling better, abd pain improving. Wants to try liquid diet     Assessment & Plan:     Recurrent Acute pancreatitis - improving   - CT abd: Increased mild pancreatic ductal dilatation. Significant peripancreatic stranding extending into the right upper quadrant and retroperitoneum. Findings are likely related to acute pancreatitis  - lipase elevated >1000  - TRG and Ig G4 normal   - IVF - decreased rate  -prn zofran, IV pain control  - GI on board  - started on CLD     Retroperitoneal mass  - concern for tumor recurrence  - CT abd: Unchanged mild soft tissue thickening is seen in the retroperitoneum surrounding the SMA. -IR consulted for possible biopsy but recommended to wait for until pancreatitis resolves   - CEA negative. CA 19-9 pending  - plan to repeat CT in 4-5 weeks once pancreatitis resolved to re-assess    Hx cholangiocarcinoma - s/p pylorus-preserving whipple   - appreciate oncology input    Diabetes mellitus type 2  -monitor blood sugars  -hold oral meds  - A1c 6.4 in 2019  - added ISS    HTN- bp stable.  C/w lisinopril  BPH- tamsulosin   GERD- pepcid and carafate     Code status: full  DVT prophylaxis: lovenox     Care Plan discussed with: Patient/Family  Disposition: Home w/Family and TBD     Hospital Problems  Date Reviewed: 2019          Codes Class Noted POA    Pancreatitis ICD-10-CM: K85.90  ICD-9-CM: 696.5  2019 Unknown                Review of Systems:   A comprehensive review of systems was negative except for that written in the HPI. Vital Signs:    Last 24hrs VS reviewed since prior progress note. Most recent are:  Visit Vitals  /55 (BP 1 Location: Right arm, BP Patient Position: At rest)   Pulse 62   Temp 97.6 °F (36.4 °C)   Resp 16   Ht 5' 8\" (1.727 m)   Wt 78.5 kg (173 lb)   SpO2 97%   BMI 26.30 kg/m²       No intake or output data in the 24 hours ending 11/19/19 1528     Physical Examination:             Constitutional:  No acute distress, cooperative, pleasant    ENT:  Oral mucous moist, oropharynx benign. Resp:  CTA bilaterally. No wheezing/rhonchi/rales. No accessory muscle use   CV:  Regular rhythm, normal rate, no murmurs, gallops, rubs    GI:  Soft, non distended, TTP in epigastric area. normoactive bowel sounds, no hepatosplenomegaly     Musculoskeletal:  No edema, warm, 2+ pulses throughout    Neurologic:  Moves all extremities. AAOx3, CN II-XII reviewed     Psych:  Good insight, Not anxious nor agitated. Data Review:    Review and/or order of clinical lab test      Labs:     Recent Labs     11/19/19  0228 11/18/19  1131   WBC 5.8 7.7   HGB 13.5 13.7   HCT 42.7 42.9   * 150     Recent Labs     11/19/19  0228 11/18/19  0435 11/17/19  1726    138 137   K 4.3 4.0 3.7    107 105   CO2 27 25 26   BUN 12 16 22*   CREA 0.83 0.63* 0.87   GLU 80 116* 129*   CA 8.7 8.0* 9.2     Recent Labs     11/19/19  1049 11/18/19  1131 11/18/19  0435 11/17/19  1726   SGOT 10*  --  10* 18   ALT 27  --  30 39   AP 95  --  101 127*   TBILI 1.7*  --  1.5* 1.1*   TP 5.7*  --  5.1* 7.0   ALB 3.1*  --  3.2* 4.2   GLOB 2.6  --  1.9* 2.8   LPSE 401* 1,069*  --  >3,000*     No results for input(s): INR, PTP, APTT, INREXT, INREXT in the last 72 hours. No results for input(s): FE, TIBC, PSAT, FERR in the last 72 hours. No results found for: FOL, RBCF   No results for input(s): PH, PCO2, PO2 in the last 72 hours. No results for input(s): CPK, CKNDX, TROIQ in the last 72 hours.     No lab exists for component: CPKMB  Lab Results   Component Value Date/Time    Cholesterol, total 76 (L) 10/30/2018 09:39 AM    HDL Cholesterol 25 (L) 10/30/2018 09:39 AM    LDL, calculated 41 10/30/2018 09:39 AM    Triglyceride 48 11/19/2019 02:45 AM    CHOL/HDL Ratio 4.1 08/25/2017 12:13 AM     Lab Results   Component Value Date/Time    Glucose (POC) 87 11/19/2019 11:26 AM    Glucose (POC) 98 11/19/2019 06:58 AM    Glucose (POC) 69 11/19/2019 06:25 AM    Glucose (POC) 68 11/19/2019 06:23 AM    Glucose (POC) 75 11/19/2019 06:22 AM     Lab Results   Component Value Date/Time    Color YELLOW/STRAW 11/07/2019 11:13 AM    Appearance CLEAR 11/07/2019 11:13 AM    Specific gravity 1.015 11/07/2019 11:13 AM    Specific gravity 1.027 08/16/2018 08:56 AM    pH (UA) 6.0 11/07/2019 11:13 AM    Protein NEGATIVE  11/07/2019 11:13 AM    Glucose >1,000 (A) 11/07/2019 11:13 AM    Ketone NEGATIVE  11/07/2019 11:13 AM    Bilirubin NEGATIVE  11/07/2019 11:13 AM    Urobilinogen 0.2 11/07/2019 11:13 AM    Nitrites NEGATIVE  11/07/2019 11:13 AM    Leukocyte Esterase NEGATIVE  11/07/2019 11:13 AM    Epithelial cells FEW 11/07/2019 11:13 AM    Bacteria NEGATIVE  11/07/2019 11:13 AM    WBC 0-4 11/07/2019 11:13 AM    RBC 0-5 11/07/2019 11:13 AM         Medications Reviewed:     Current Facility-Administered Medications   Medication Dose Route Frequency    sodium chloride (NS) flush 5-40 mL  5-40 mL IntraVENous Q8H    sodium chloride (NS) flush 5-40 mL  5-40 mL IntraVENous PRN    famotidine (PF) (PEPCID) 20 mg in sodium chloride 0.9% 10 mL injection  20 mg IntraVENous Q12H    aspirin delayed-release tablet 81 mg  81 mg Oral DAILY    lisinopril (PRINIVIL, ZESTRIL) tablet 40 mg  40 mg Oral QHS    0.9% sodium chloride infusion  150 mL/hr IntraVENous CONTINUOUS    HYDROmorphone (PF) (DILAUDID) injection 1 mg  1 mg IntraVENous Q6H PRN    ondansetron (ZOFRAN) injection 4 mg  4 mg IntraVENous Q6H PRN    tamsulosin (FLOMAX) capsule 0.4 mg  0.4 mg Oral DAILY    [Held by provider] enoxaparin (LOVENOX) injection 40 mg  40 mg SubCUTAneous Q24H    sucralfate (CARAFATE) tablet 1 g  1 g Oral AC&HS     ______________________________________________________________________  EXPECTED LENGTH OF STAY: 2d 12h  ACTUAL LENGTH OF STAY:          2                 Maki Arredondo MD

## 2019-11-20 VITALS
HEART RATE: 66 BPM | BODY MASS INDEX: 26.22 KG/M2 | RESPIRATION RATE: 16 BRPM | TEMPERATURE: 97.7 F | HEIGHT: 68 IN | WEIGHT: 173 LBS | OXYGEN SATURATION: 95 % | DIASTOLIC BLOOD PRESSURE: 65 MMHG | SYSTOLIC BLOOD PRESSURE: 107 MMHG

## 2019-11-20 LAB
ANION GAP SERPL CALC-SCNC: 5 MMOL/L (ref 5–15)
BASOPHILS # BLD: 0 K/UL (ref 0–0.1)
BASOPHILS NFR BLD: 1 % (ref 0–1)
BUN SERPL-MCNC: 9 MG/DL (ref 6–20)
BUN/CREAT SERPL: 11 (ref 12–20)
CALCIUM SERPL-MCNC: 8.9 MG/DL (ref 8.5–10.1)
CANCER AG19-9 SERPL-ACNC: 11 U/ML (ref 0–35)
CHLORIDE SERPL-SCNC: 106 MMOL/L (ref 97–108)
CO2 SERPL-SCNC: 28 MMOL/L (ref 21–32)
CREAT SERPL-MCNC: 0.85 MG/DL (ref 0.7–1.3)
DIFFERENTIAL METHOD BLD: ABNORMAL
EOSINOPHIL # BLD: 0.3 K/UL (ref 0–0.4)
EOSINOPHIL NFR BLD: 5 % (ref 0–7)
ERYTHROCYTE [DISTWIDTH] IN BLOOD BY AUTOMATED COUNT: 14.5 % (ref 11.5–14.5)
GLUCOSE BLD STRIP.AUTO-MCNC: 118 MG/DL (ref 65–100)
GLUCOSE SERPL-MCNC: 115 MG/DL (ref 65–100)
HCT VFR BLD AUTO: 44.6 % (ref 36.6–50.3)
HGB BLD-MCNC: 14.9 G/DL (ref 12.1–17)
IMM GRANULOCYTES # BLD AUTO: 0 K/UL (ref 0–0.04)
IMM GRANULOCYTES NFR BLD AUTO: 1 % (ref 0–0.5)
LYMPHOCYTES # BLD: 0.8 K/UL (ref 0.8–3.5)
LYMPHOCYTES NFR BLD: 17 % (ref 12–49)
MCH RBC QN AUTO: 30.1 PG (ref 26–34)
MCHC RBC AUTO-ENTMCNC: 33.4 G/DL (ref 30–36.5)
MCV RBC AUTO: 90.1 FL (ref 80–99)
MONOCYTES # BLD: 0.6 K/UL (ref 0–1)
MONOCYTES NFR BLD: 11 % (ref 5–13)
NEUTS SEG # BLD: 3.3 K/UL (ref 1.8–8)
NEUTS SEG NFR BLD: 65 % (ref 32–75)
NRBC # BLD: 0 K/UL (ref 0–0.01)
NRBC BLD-RTO: 0 PER 100 WBC
PLATELET # BLD AUTO: 145 K/UL (ref 150–400)
PMV BLD AUTO: 11 FL (ref 8.9–12.9)
POTASSIUM SERPL-SCNC: 3.2 MMOL/L (ref 3.5–5.1)
RBC # BLD AUTO: 4.95 M/UL (ref 4.1–5.7)
SERVICE CMNT-IMP: ABNORMAL
SODIUM SERPL-SCNC: 139 MMOL/L (ref 136–145)
WBC # BLD AUTO: 5 K/UL (ref 4.1–11.1)

## 2019-11-20 PROCEDURE — 82962 GLUCOSE BLOOD TEST: CPT

## 2019-11-20 PROCEDURE — 80048 BASIC METABOLIC PNL TOTAL CA: CPT

## 2019-11-20 PROCEDURE — 74011000250 HC RX REV CODE- 250: Performed by: HOSPITALIST

## 2019-11-20 PROCEDURE — 74011250637 HC RX REV CODE- 250/637: Performed by: INTERNAL MEDICINE

## 2019-11-20 PROCEDURE — 74011250636 HC RX REV CODE- 250/636: Performed by: HOSPITALIST

## 2019-11-20 PROCEDURE — 36415 COLL VENOUS BLD VENIPUNCTURE: CPT

## 2019-11-20 PROCEDURE — 85025 COMPLETE CBC W/AUTO DIFF WBC: CPT

## 2019-11-20 PROCEDURE — 74011250637 HC RX REV CODE- 250/637: Performed by: HOSPITALIST

## 2019-11-20 RX ORDER — OXYCODONE AND ACETAMINOPHEN 5; 325 MG/1; MG/1
1 TABLET ORAL
Status: DISCONTINUED | OUTPATIENT
Start: 2019-11-20 | End: 2019-11-20 | Stop reason: HOSPADM

## 2019-11-20 RX ORDER — POTASSIUM CHLORIDE 750 MG/1
40 TABLET, FILM COATED, EXTENDED RELEASE ORAL
Status: COMPLETED | OUTPATIENT
Start: 2019-11-20 | End: 2019-11-20

## 2019-11-20 RX ORDER — METFORMIN HYDROCHLORIDE 500 MG/1
500 TABLET, EXTENDED RELEASE ORAL DAILY
Qty: 180 TAB | Refills: 3 | Status: SHIPPED
Start: 2019-11-20 | End: 2020-01-17 | Stop reason: SINTOL

## 2019-11-20 RX ADMIN — ASPIRIN 81 MG: 81 TABLET, COATED ORAL at 09:22

## 2019-11-20 RX ADMIN — POTASSIUM CHLORIDE 40 MEQ: 750 TABLET, FILM COATED, EXTENDED RELEASE ORAL at 09:22

## 2019-11-20 RX ADMIN — Medication 10 ML: at 05:13

## 2019-11-20 RX ADMIN — FAMOTIDINE 20 MG: 10 INJECTION, SOLUTION INTRAVENOUS at 09:23

## 2019-11-20 RX ADMIN — SUCRALFATE 1 G: 1 TABLET ORAL at 06:53

## 2019-11-20 NOTE — PROGRESS NOTES
Orthopedic & Surgical Nursing Communication Tool        Bedside and Verbal shift change report given to Carrie Escobar RN (incoming nurse) by Simran Pugh RN (outgoing nurse) on Monette Goldberg a 62 y.o. male and born 1955 who arrived at the hospital on 11/17/2019  5:20 PM. Report included the following information SBAR, Kardex and MAR.          Significant changes during shift: anxious to go home      Issues for physician to address: none          Pain Controlled          [x] yes          [] no    Bowel Movement          [x] yes          [] no          Code Status: Full Code     Admit Diagnosis: Pancreatitis [K85.90]     Surgery: * No surgery found *     Infections: No current active infections     Allergies: Shellfish derived and Pcn [penicillins]     Current diet: DIET NUTRITIONAL SUPPLEMENTS Lunch, Dinner; Ensure Clear  DIET GI LITE (POST SURGICAL)           Vital Signs:     Patient Vitals for the past 12 hrs:   Temp Pulse Resp BP SpO2   11/20/19 0813 97.7 °F (36.5 °C) 66 16 107/65 95 %   11/20/19 0225 97.9 °F (36.6 °C) 67 16 115/66 97 %      Intake & Output:       Intake/Output Summary (Last 24 hours) at 11/20/2019 0814  Last data filed at 11/19/2019 2000  Gross per 24 hour   Intake 8045.42 ml   Output 400 ml   Net 7645.42 ml      Laboratory Results:     Recent Results (from the past 12 hour(s))   GLUCOSE, POC    Collection Time: 11/19/19 10:05 PM   Result Value Ref Range    Glucose (POC) 162 (H) 65 - 100 mg/dL    Performed by Kalen Floiran    CBC WITH AUTOMATED DIFF    Collection Time: 11/20/19  4:04 AM   Result Value Ref Range    WBC 5.0 4.1 - 11.1 K/uL    RBC 4.95 4.10 - 5.70 M/uL    HGB 14.9 12.1 - 17.0 g/dL    HCT 44.6 36.6 - 50.3 %    MCV 90.1 80.0 - 99.0 FL    MCH 30.1 26.0 - 34.0 PG    MCHC 33.4 30.0 - 36.5 g/dL    RDW 14.5 11.5 - 14.5 %    PLATELET 637 (L) 019 - 400 K/uL    MPV 11.0 8.9 - 12.9 FL    NRBC 0.0 0  WBC    ABSOLUTE NRBC 0.00 0.00 - 0.01 K/uL    NEUTROPHILS 65 32 - 75 %    LYMPHOCYTES 17 12 - 49 %    MONOCYTES 11 5 - 13 %    EOSINOPHILS 5 0 - 7 %    BASOPHILS 1 0 - 1 %    IMMATURE GRANULOCYTES 1 (H) 0.0 - 0.5 %    ABS. NEUTROPHILS 3.3 1.8 - 8.0 K/UL    ABS. LYMPHOCYTES 0.8 0.8 - 3.5 K/UL    ABS. MONOCYTES 0.6 0.0 - 1.0 K/UL    ABS. EOSINOPHILS 0.3 0.0 - 0.4 K/UL    ABS. BASOPHILS 0.0 0.0 - 0.1 K/UL    ABS. IMM. GRANS. 0.0 0.00 - 0.04 K/UL    DF AUTOMATED     METABOLIC PANEL, BASIC    Collection Time: 11/20/19  4:04 AM   Result Value Ref Range    Sodium 139 136 - 145 mmol/L    Potassium 3.2 (L) 3.5 - 5.1 mmol/L    Chloride 106 97 - 108 mmol/L    CO2 28 21 - 32 mmol/L    Anion gap 5 5 - 15 mmol/L    Glucose 115 (H) 65 - 100 mg/dL    BUN 9 6 - 20 MG/DL    Creatinine 0.85 0.70 - 1.30 MG/DL    BUN/Creatinine ratio 11 (L) 12 - 20      GFR est AA >60 >60 ml/min/1.73m2    GFR est non-AA >60 >60 ml/min/1.73m2    Calcium 8.9 8.5 - 10.1 MG/DL   GLUCOSE, POC    Collection Time: 11/20/19  6:48 AM   Result Value Ref Range    Glucose (POC) 118 (H) 65 - 100 mg/dL    Performed by Zo Davis             Opportunity for questions and clarifications were given to the incoming nurse. Patient's bed locked and is in low position, side rails up x2, door open PRN, call bell within reach of patient and patient not in distress.       Signed by: Sapphire Hernandez, RN, BSN, CMSRN                       11/20/2019 at 8:14 AM

## 2019-11-20 NOTE — PROGRESS NOTES
Problem: Pain  Goal: *Control of Pain  Outcome: Progressing Towards Goal     Problem: General Medical Care Plan  Goal: *Vital signs within specified parameters  Outcome: Progressing Towards Goal  Goal: *Optimal pain control at patient's stated goal  Outcome: Progressing Towards Goal  Goal: *Skin integrity maintained  Outcome: Progressing Towards Goal  Goal: *Anxiety reduced or absent  Outcome: Progressing Towards Goal     Problem: Falls - Risk of  Goal: *Absence of Falls  Description  Document Bernarda Ruts Fall Risk and appropriate interventions in the flowsheet.   Outcome: Progressing Towards Goal  Note:   Fall Risk Interventions:            Medication Interventions: Assess postural VS orthostatic hypotension, Evaluate medications/consider consulting pharmacy, Teach patient to arise slowly, Patient to call before getting OOB

## 2019-11-20 NOTE — PROGRESS NOTES
[address] [facility] [address] [address]  [address]  [address] [place] [address] Dr  [phone]                GI PROGRESS NOTE  Jackey Kawasaki, [facility]  Work Cell: [phone]      NAME:   Neville Mckeon   :    1955   MRN:    [id number]     Assessment/Plan   1. Recurrent acute pancreatitis - 2nd occurrence since whipple procedure, CT w/ mild PD dilation, concern for possible stricture. Triglyceride and IgG4 normal. Symptoms resolved. Lipase improved. - Diet as tolerated  - Supportive management per primary team     2. Retroperitoneal soft tissue thickening - concern for possible inflammatory phlegmon, r/o recurrent malignancy. CA 19-9 - 11.   - Discussed with IR - plan to repeat CT in 4-5 weeks once pancreatitis resolved to re-assess  - Appreciate surgery input      3. Hx cholangiocarcinoma - s/p pylorus-preserving whipple   - Hematology following   4. Weight loss  5. DM    Okay to discharge from GI standpoint to follow-up outpatient to complete the above. Patient Active Problem List   Diagnosis Code    Obstructive jaundice K83.1    Common bile duct (CBD) obstruction K83.1    UTI (urinary tract infection) N39.0    Cholangiocarcinoma of biliary tract (HCC) C24.9    Cholangiocarcinoma determined by biopsy of biliary tract (HCC) C24.9    Paroxysmal atrial fibrillation (HCC) I48.0    S/P ablation of atrial fibrillation Z98.890, Z86.79    Essential hypertension I10    Sjogren's disease ([facility] [address] M35.00    Cramps, muscle, general R25.2    Low vitamin D level R79.89    Low vitamin B12 level E53.8    Vomiting R11.10    Controlled type 2 diabetes mellitus without complication, without long-term current use of insulin (HCC) E11.9    Acute pancreatitis K85.90    Leukocytosis D72.829    Pancreatitis K85.90       Subjective:     Denies any complaints. Tolerating diet. Objective:     VITALS:   Last 24hrs VS reviewed since prior hospitalist progress note.  Most recent are:  Visit Vitals  /65 (BP 1 Location: Right arm, BP Patient Position: At rest)   Pulse 66   Temp 97.7 °F (36.5 °C)   Resp 16   Ht 5' 8\" (1.727 m)   Wt 78.5 kg (173 lb)   SpO2 95%   BMI 26.30 kg/m²       Intake/Output Summary (Last 24 hours) at 11/20/2019 0920  Last data filed at 11/19/2019 2000  Gross per 24 hour   Intake 8045.42 ml   Output 400 ml   Net 7645.42 ml        PHYSICAL EXAM:  General   well developed, alert, in no acute distress  EENT  Normocephalic, Atraumatic, PERRLA, EOMI, sclera clear  Respiratory   Clear To Auscultation bilaterally - no wheezes, rales, rhonchi, or crackles  Cardiology  Regular Rate and Rythmn  - no murmurs, rubs or gallops  Abdominal  Soft, non-tender, non-distended, positive bowel sounds, no hepatosplenomegaly, no palpable mass  Extremities  No clubbing, cyanosis, or edema. Pulses intact. Neurological  No focal neurological deficits noted  Psychological  Oriented x 3. Normal affect. Lab Data   Recent Results (from the past 12 hour(s))   GLUCOSE, POC    Collection Time: 11/19/19 10:05 PM   Result Value Ref Range    Glucose (POC) 162 (H) 65 - 100 mg/dL    Performed by Zuhair Guevara    CBC WITH AUTOMATED DIFF    Collection Time: 11/20/19  4:04 AM   Result Value Ref Range    WBC 5.0 4.1 - 11.1 K/uL    RBC 4.95 4.10 - 5.70 M/uL    HGB 14.9 12.1 - 17.0 g/dL    HCT 44.6 36.6 - 50.3 %    MCV 90.1 80.0 - 99.0 FL    MCH 30.1 26.0 - 34.0 PG    MCHC 33.4 30.0 - 36.5 g/dL    RDW 14.5 11.5 - 14.5 %    PLATELET 932 (L) 814 - 400 K/uL    MPV 11.0 8.9 - 12.9 FL    NRBC 0.0 0  WBC    ABSOLUTE NRBC 0.00 0.00 - 0.01 K/uL    NEUTROPHILS 65 32 - 75 %    LYMPHOCYTES 17 12 - 49 %    MONOCYTES 11 5 - 13 %    EOSINOPHILS 5 0 - 7 %    BASOPHILS 1 0 - 1 %    IMMATURE GRANULOCYTES 1 (H) 0.0 - 0.5 %    ABS. NEUTROPHILS 3.3 1.8 - 8.0 K/UL    ABS. LYMPHOCYTES 0.8 0.8 - 3.5 K/UL    ABS. MONOCYTES 0.6 0.0 - 1.0 K/UL    ABS. EOSINOPHILS 0.3 0.0 - 0.4 K/UL    ABS. BASOPHILS 0.0 0.0 - 0.1 K/UL    ABS. IMM.  GRANS. 0.0 0.00 - 0.04 K/UL    DF AUTOMATED     METABOLIC PANEL, BASIC    Collection Time: 11/20/19  4:04 AM   Result Value Ref Range    Sodium 139 136 - 145 mmol/L    Potassium 3.2 (L) 3.5 - 5.1 mmol/L    Chloride 106 97 - 108 mmol/L    CO2 28 21 - 32 mmol/L    Anion gap 5 5 - 15 mmol/L    Glucose 115 (H) 65 - 100 mg/dL    BUN 9 6 - 20 MG/DL    Creatinine 0.85 0.70 - 1.30 MG/DL    BUN/Creatinine ratio 11 (L) 12 - 20      GFR est AA >60 >60 ml/min/1.73m2    GFR est non-AA >60 >60 ml/min/1.73m2    Calcium 8.9 8.5 - 10.1 MG/DL   GLUCOSE, POC    Collection Time: 11/20/19  6:48 AM   Result Value Ref Range    Glucose (POC) 118 (H) 65 - 100 mg/dL    Performed by Nivia Bence          Medications: Reviewed    PMH/SH reviewed - no change compared to H&P  Mid-Level Provider: Chery Bullard NP   Date/Time:  11/20/2019

## 2019-11-20 NOTE — PROGRESS NOTES
Hospital follow-up PCP transitional care appointment has been scheduled with Dr. Joseph Rodriguez for Monday, 12/2/19 at 3:30 p.m. Pending patient discharge.   Brice Bahena, Care Management Specialist.

## 2019-11-20 NOTE — DISCHARGE SUMMARY
Discharge Summary     Patient:  Clif Alvarez       MRN: [id number]       YOB: 1955       Age: 62 y.o. Date of admission:  11/17/2019    Date of discharge:  11/20/2019    Primary care provider: Dr. Armani Ham DO    Admitting provider:  Olimpia Nettles MD    Discharging provider:  Zeyad Bernal [address] [phone]. If unavailable, call [phone] and ask the  to page the triage hospitalist.    Consultations  · IP CONSULT TO GASTROENTEROLOGY  · IP CONSULT TO ONCOLOGY  · IP CONSULT TO GENERAL SURGERY    Procedures  · * No surgery found *    Discharge destination: Home. The patient is stable for discharge. Admission diagnosis  · Pancreatitis [K85.90]    Current Discharge Medication List      CONTINUE these medications which have CHANGED    Details   metFORMIN ER (GLUCOPHAGE XR) 500 mg tablet Take 1 Tab by mouth daily. Qty: 180 Tab, Refills: 3         CONTINUE these medications which have NOT CHANGED    Details   sucralfate (CARAFATE) 1 gram tablet Take 1 g by mouth four (4) times daily. omeprazole (PRILOSEC) 40 mg capsule Take 1 Cap by mouth daily. Qty: 30 Cap, Refills: 0      empagliflozin (JARDIANCE) 25 mg tablet Take 1 Tab by mouth daily. Qty: 90 Tab, Refills: 3    Associated Diagnoses: Type 2 diabetes mellitus without complication, without long-term current use of insulin (HCC)      famotidine (PEPCID) 20 mg tablet Take 20 mg by mouth daily. sildenafil citrate (VIAGRA) 50 mg tablet Take 1 Tab by mouth as needed (ED). Qty: 30 Tab, Refills: 1      ramipril (ALTACE) 10 mg capsule Take 1 Cap by mouth nightly. Qty: 90 Cap, Refills: 3      calcium carbonate (TUMS) 200 mg calcium (500 mg) chew Take 2 Tabs by mouth three (3) times daily as needed (indigestion).       ondansetron (ZOFRAN ODT) 4 mg disintegrating tablet Take 1 Tab by mouth every eight (8) hours as needed for Nausea. Qty: 20 Tab, Refills: 0      ONETOUCH ULTRA2 METER misc USE AS DIRECTED  Refills: 0      !! ONETOUCH DELICA LANCETS 30 gauge misc USE AS DIRECTED  Refills: 3      Blood-Glucose Meter monitoring kit 1 preferred brand glucometer for checking home glucose  Qty: 1 Kit, Refills: 0      !! lancets misc Use preferred brand; Check glucose 1 time daily, Diagnosis E11.65  Qty: 100 Each, Refills: 3      glucose blood VI test strips (PHARMACIST CHOICE) strip Use preferred brand; Check glucose 1 times daily, Diagnosis E11.65  Qty: 100 Strip, Refills: 3      gabapentin (NEURONTIN) 300 mg capsule TAKE 3 CAPS BY MOUTH TWO (2) TIMES A DAY. Qty: 540 Cap, Refills: 3      aspirin delayed-release 81 mg tablet Take 81 mg by mouth daily. tamsulosin (FLOMAX) 0.4 mg capsule TAKE ONE CAPSULE BY MOUTH EVERY EVENING  Refills: 0      cyanocobalamin (VITAMIN B12) 1,000 mcg/mL injection INJECT CONTENTS OF ONE VIAL INTRAMUSCULARLY EVERY OTHER WEEK  Qty: 6 mL, Refills: 6      alpha-D-galactosidase (BEANO PO) Take 1 Tab by mouth two (2) times a day. cholecalciferol, vitamin D3, (VITAMIN D3 PO) Take 1 Tab by mouth daily. cetirizine (ZYRTEC) 10 mg tablet Take 10 mg by mouth nightly. !! - Potential duplicate medications found. Please discuss with provider. STOP taking these medications       promethazine (PHENERGAN) 25 mg tablet Comments:   Reason for Stopping:         pantoprazole (PROTONIX) 40 mg tablet Comments:   Reason for Stopping:                Follow-up Information     Follow up With Specialties Details Why Alessandra Higuera, Narendra Andino, DO Internal Medicine In 1 week  [address] [facility]  [address] [facility]      Jose Andrews MD Gastroenterology, Gastroenterology   [address] [facility]  [address] [facility] [address]  [phone]      En Hutchinson MD General Surgery, Surgical Oncology, Colon and Rectal Surgery   [address] [facility] [place] [address] [facility] MD Jerman H[facility] On[facility], Internal Medicine, Hematology, Oncology   [address] [facility]  [phone]            Final discharge diagnoses and brief hospital course  61 yo male with a history of cholangiocarcinoma s/p whipple in 2017 is admitted for treatment of pancreatitis    Recurrent Acute pancreatitis - improved   - CT abd: Increased mild pancreatic ductal dilatation. Significant peripancreatic stranding extending into the right upper quadrant and retroperitoneum. Findings are likely related to acute pancreatitis  - lipase elevated >1000  - TRG and Ig G4 normal   -prn zofran,  pain control  - GI on board; recs : Recurrent pancreatic stricture will need to be assessed again by   - diet advanced to GI lite and tolerated well.      Retroperitoneal mass  - concern for tumor recurrence  - CT abd: Unchanged mild soft tissue thickening is seen in the retroperitoneum surrounding the SMA. -IR consulted for possible biopsy but recommended to wait for until pancreatitis resolves   - CEA , CA 19-9 negative  - plan to repeat CT in 4-5 weeks once pancreatitis resolved to re-assess     Hx cholangiocarcinoma - s/p pylorus-preserving whipple   - appreciate oncology input  - surgery on board; recs; \" He has had some increased n/v over the past month. This was an issue post-operatively for a prolonged period with bile reflux but seemed to have improved. If this continues, it may be reasonable to consider surgical conversion to a eren-en Y gastrojejunostomy to help prevent bile reflux\"      Diabetes mellitus type 2  -monitor blood sugars  -hold oral meds  - A1c 6.4 in 9/2019  - added ISS     HTN- bp stable.  C/w lisinopril  BPH- tamsulosin   GERD- prilosec/ pepcid and carafate     Recommendations:  PCP f/u in 1 week  GI, surgery and oncology follow up  CT abd in 4-5 weeks     Physical examination at discharge  Visit Vitals  /65 (BP 1 Location: Right arm, BP Patient Position: At rest)   Pulse 66   Temp 97.7 °F (36.5 °C)   Resp 16   Ht 5' 8\" (1.727 m)   Wt 78.5 kg (173 lb)   SpO2 95%   BMI 26.30 kg/m²     Constitutional:  No acute distress, cooperative, pleasant    ENT:  Oral mucous moist, oropharynx benign. Resp:  CTA bilaterally. No wheezing/rhonchi/rales. No accessory muscle use   CV:  Regular rhythm, normal rate, no murmurs, gallops, rubs    GI:  Soft, non distended, TTP in epigastric area. normoactive bowel sounds, no hepatosplenomegaly     Musculoskeletal:  No edema, warm, 2+ pulses throughout    Neurologic:  Moves all extremities. AAOx3, CN II-XII reviewed                         Psych:  Good insight, Not anxious nor agitated.             Pertinent imaging studies:    CT abd: Increased mild pancreatic ductal dilatation. Significant peripancreatic stranding extending into the right upper quadrant and retroperitoneum. Findings are likely related to acute pancreatitis  Unchanged mild soft tissue thickening is seen in the retroperitoneum surrounding the SMA. Recent Labs     11/20/19  0404 11/19/19  0228 11/18/19  1131   WBC 5.0 5.8 7.7   HGB 14.9 13.5 13.7   HCT 44.6 42.7 42.9   * 146* 150     Recent Labs     11/20/19  0404 11/19/19  0228 11/18/19  0435    140 138   K 3.2* 4.3 4.0    108 107   CO2 28 27 25   BUN 9 12 16   CREA 0.85 0.83 0.63*   * 80 116*   CA 8.9 8.7 8.0*     Recent Labs     11/19/19  1049 11/18/19  1131 11/18/19  0435 11/17/19  1726   SGOT 10*  --  10* 18   AP 95  --  101 127*   TP 5.7*  --  5.1* 7.0   ALB 3.1*  --  3.2* 4.2   GLOB 2.6  --  1.9* 2.8   LPSE 401* 1,069*  --  >3,000*     No results for input(s): INR, PTP, APTT, INREXT in the last 72 hours. No results for input(s): FE, TIBC, PSAT, FERR in the last 72 hours. No results for input(s): PH, PCO2, PO2 in the last 72 hours. No results for input(s): CPK, CKMB in the last 72 hours.     No lab exists for component: TROPONINI  No components found for: [place]    Chronic Diagnoses:    Problem List as of 11/20/2019 Date Reviewed: 11/18/2019          Codes Class Noted - Resolved    Pancreatitis ICD-10-CM: K85.90  ICD-9-CM: 929.2  11/17/2019 - Present        Acute pancreatitis ICD-10-CM: K85.90  ICD-9-CM: 577.0  8/16/2018 - Present        Leukocytosis ICD-10-CM: D72.829  ICD-9-CM: 288.60  8/16/2018 - Present        Controlled type 2 diabetes mellitus without complication, without long-term current use of insulin (HCC) (Chronic) ICD-10-CM: E11.9  ICD-9-CM: 250.00  7/30/2018 - Present        Vomiting ICD-10-CM: R11.10  ICD-9-CM: 787.03  7/5/2018 - Present        Paroxysmal atrial fibrillation (HCC) (Chronic) ICD-10-CM: I48.0  ICD-9-CM: 427.31  10/26/2017 - Present        S/P ablation of atrial fibrillation (Chronic) ICD-10-CM: Z98.890, Z86.79  ICD-9-CM: V45.89  10/26/2017 - Present        Essential hypertension (Chronic) ICD-10-CM: I10  ICD-9-CM: 401.9  10/26/2017 - Present        Sjogren's disease ([facility] (Chronic) ICD-10-CM: M35.00  ICD-9-CM: 710.2  10/26/2017 - Present        Cramps, muscle, general ICD-10-CM: R25.2  ICD-9-CM: 729.82  10/26/2017 - Present        Low vitamin D level (Chronic) ICD-10-CM: R79.89  ICD-9-CM: 790.6  10/26/2017 - Present        Low vitamin B12 level (Chronic) ICD-10-CM: E53.8  ICD-9-CM: 266.2  10/26/2017 - Present        Cholangiocarcinoma determined by biopsy of biliary tract ([facility] ICD-10-CM: C24.9  ICD-9-CM: 156.9  10/6/2017 - Present        Cholangiocarcinoma of biliary tract ([facility] ICD-10-CM: C24.9  ICD-9-CM: 156.9  9/11/2017 - Present        Common bile duct (CBD) obstruction ICD-10-CM: K83.1  ICD-9-CM: 576.2  7/25/2017 - Present        UTI (urinary tract infection) ICD-10-CM: N39.0  ICD-9-CM: 599.0  7/25/2017 - Present        Obstructive jaundice ICD-10-CM: K83.1  ICD-9-CM: 576.2  7/23/2017 - Present        RESOLVED: Pancreatitis ICD-10-CM: K85.90  ICD-9-CM: 577.0  8/31/2018 - 8/31/2018        RESOLVED: Sepsis ([address] ICD-10-CM: A41.9  ICD-9-CM: 038.9, 995.91  8/25/2017 - 8/31/2017              Time spent on discharge related activities today greater than 30 minutes.       Signed:  Андрей Gillis MD                 Hospitalist, Internal Medicine      Cc: Warren Rodrigues DO

## 2019-11-20 NOTE — DISCHARGE INSTRUCTIONS
Patient Education        Pancreatitis: Care Instructions  Your Care Instructions    The pancreas is an organ behind the stomach. It makes hormones and enzymes to help your body digest food. But if these enzymes attack the pancreas, it can get inflamed. This is called pancreatitis. Most cases are caused by gallstones or by heavy alcohol use. If you take care of yourself at home, it will help you get better. It will also help you avoid more problems with your pancreas. Follow-up care is a key part of your treatment and safety. Be sure to make and go to all appointments, and call your doctor if you are having problems. It's also a good idea to know your test results and keep a list of the medicines you take. How can you care for yourself at home? · Drink clear liquids and eat bland foods until you feel better. Bixby foods include rice, dry toast, and crackers. They also include bananas and applesauce. · Eat a low-fat diet until your doctor says your pancreas is healed. · Do not drink alcohol. Tell your doctor if you need help to quit. Counseling, support groups, and sometimes medicines can help you stay sober. · Be safe with medicines. Read and follow all instructions on the label. ? If the doctor gave you a prescription medicine for pain, take it as prescribed. ? If you are not taking a prescription pain medicine, ask your doctor if you can take an over-the-counter medicine. · If your doctor prescribed antibiotics, take them as directed. Do not stop taking them just because you feel better. You need to take the full course of antibiotics. · Get extra rest until you feel better. To prevent future problems with your pancreas  · Do not drink alcohol. · Tell your doctors and pharmacist that you've had pancreatitis. They can help you avoid medicines that may cause this problem again. When should you call for help? Call 911 anytime you think you may need emergency care.  For example, call if:    · You vomit blood or what looks like coffee grounds.     · Your stools are maroon or very bloody.    Call your doctor now or seek immediate medical care if:    · You have new or worse belly pain.     · Your stools are black and look like tar, or they have streaks of blood.     · You are vomiting.    Watch closely for changes in your health, and be sure to contact your doctor if:    · You do not get better as expected. Where can you learn more? Go to http://sandra-jenelle.info/. Enter Y854 in the search box to learn more about \"Pancreatitis: Care Instructions. \"  Current as of: November 7, 2018  Content Version: 12.2  © 2166-7340 [employer]. Care instructions adapted under license by [employer] (which disclaims liability or warranty for this information). If you have questions about a medical condition or this instruction, always ask your healthcare professional. [address] any warranty or liability for your use of this information. Please bring this form with you to show your primary care provider at your follow-up appointment. Primary care provider:  Dr. Melissa Sanders DO    Discharging provider:  Rashi Treadwell MD    You have been admitted to the hospital with the following diagnoses:  · Pancreatitis [K85.90]    FOLLOW-UP CARE RECOMMENDATIONS:    APPOINTMENTS:  · Follow-up with primary care provider, Dr. Melissa Sanders DO  -  Please call [phone] shortly after discharge to set up an appointment to be seen in  1 week   · Gastroenterology, surgery and Oncology follow ups    FOLLOW-UP TESTS recommended: none    PENDING TEST RESULTS:  At the time of your discharge the following test results are still pending: none  Please make sure you review these results with your outpatient follow-up provider(s).     SYMPTOMS to watch for: chest pain, shortness of breath, fever, chills, nausea, vomiting, diarrhea, change in mentation, falling, weakness, bleeding. DIET/what to eat:  Diabetic Diet    ACTIVITY:  Activity as tolerated      What to do if new or unexpected symptoms occur? If you experience any of the above symptoms (or should other concerns or questions arise after discharge) please call your primary care physician. Return to the emergency room if you cannot get hold of your doctor. · It is very important that you keep your follow-up appointment(s). · Please bring discharge papers, medication list (and/or medication bottles) to your follow-up appointments for review by your outpatient provider(s). · Please check the list of medications and be sure it includes every medication (even non-prescription medications) that your provider wants you to take. · It is important that you take the medication exactly as they are prescribed. · Keep your medication in the bottles provided by the pharmacist and keep a list of the medication names, dosages, and times to be taken in your wallet. · Do not take other medications without consulting your doctor. · If you have any questions about your medications or other instructions, please talk to your nurse or care provider before you leave the hospital.    I understand that if any problems occur once I am at home I am to contact my physician. These instructions were explained to me and I had the opportunity to ask questions. DISCHARGE SUMMARY from Nurse      The discharge information has been reviewed with the patient. The patient verbalized understanding. Discharge medications reviewed with the patient and appropriate educational materials and side effects teaching were provided.

## 2019-11-20 NOTE — PROGRESS NOTES
Orthopedics Spine Incoming Shift Nursing Note        INCOMING SHIFT REPORT:    Verbal and/or Written report received from Maribel Mcrae RN by Raoul Acevedo RN on Katt [place] a 62 y.o. male who was admitted on 11/17/2019  5:20 PM and currently admitted to unit. Code Status: Full Code     Admit Diagnosis: Pancreatitis [K85.90]     Surgery: * No surgery found *     Infections: No current active infections     Allergies: Shellfish derived and Pcn [penicillins]     Current diet: DIET NUTRITIONAL SUPPLEMENTS Lunch, Dinner; Ensure Clear  DIET GI LITE (POST SURGICAL)     Lines:   Peripheral IV 11/17/19 Left Antecubital (Active)   Site Assessment Clean, dry, & intact 11/19/2019  4:00 PM   Phlebitis Assessment 0 11/19/2019  4:00 PM   Infiltration Assessment 0 11/19/2019  4:00 PM   Dressing Status Clean, dry, & intact 11/19/2019  4:00 PM   Dressing Type Transparent 11/19/2019  4:00 PM   Hub Color/Line Status Infusing 11/19/2019  4:00 PM   Action Taken Open ports on tubing capped 11/19/2019  4:00 PM   Alcohol Cap Used Yes 11/19/2019  4:00 PM          Report consisted of the following information SBAR, Kardex and MAR and the information was reviewed with the reporting nurse. Opportunity for questions and clarifications were provided. Patient's bed locked and in low position, side rails up x 2, door open PRN, call bell within patient's reach and patient is not in distress. A complete nursing assessment will be completed by the receiving nurse.       Raoul Acevedo RN, BSN, [facility]    11/19/2019 at 11:11 PM

## 2019-11-21 ENCOUNTER — PATIENT OUTREACH (OUTPATIENT)
Dept: OTHER | Age: 64
End: 2019-11-21

## 2019-11-21 NOTE — PROGRESS NOTES
Transition Of Care Note    Patient discharged from [facility] admitted on  and discharged on  for acute pancreatitis. Medical History:     Past Medical History:   Diagnosis Date    Arrhythmia     Previous a.fib, ablation, NSR now; NO LONGER FOLLOWED BY CARDIOLOGIST.  Autoimmune disease ([facility] [address]     SJOGREN'S    Cancer ([facility] [address]     COMMON BILE DUCT, ADENOCARCINOMA    Diabetes ([facility] [address]     Family history of skin cancer     Hypertension     Sepsis ([facility] [address] 2017    STENTING TO BILE DUCT    Status post chemotherapy     Stroke [facility])     brain aneurysm - no deficits    Sun-damaged skin     Sunburn, blistering        Care Manager contacted the patient by telephone to perform post hospital discharge assessment. Verified  and zip code with patient as identifiers. Provided introduction to self, and explanation of the Nurse Care Manager role. Patient's primary care provider relationship reviewed with patient and modified, as applicable. Red Flags:  Call 911 anytime you think you may need emergency care. For example, call if:   You vomit blood or what looks like coffee grounds.  Your stools are maroon or very bloody. Call your doctor now or seek immediate medical care if:   You have new or worse belly pain.  Your stools are black and look like tar, or they have streaks of blood.  You are vomiting. Patient reports the following:   Gastrointestinal Condition Focused Assessment    Skin- any open wounds, incisions or appliance no  Description of wound- n/a  New or worsening pain? no  If yes, pain rated 0-10: n/a Location/pain characteristics: none   New or worsening numbness or tingling? no  If yes, location of numbness and tingling: n/a  Activity level- moving several times a day, or as recommended? yes  Abnormal activity level reported: n/a   Nutrition- prescribed diet?  yes   Diet prescribed or recommended: diabetic diet  Difficulty swallowing no  Last weight of 173 lbs lbs on   Last BM on 11/20 abnormal consistency or amount yes - loose to be expected with all fluids  Abnormal labs lipase 401     In the last 24 hour have you experienced; Fever no  Low body temperature no  Chills or shaking no  Sweating no  Fast heart rate no  Fast breathing no  Dizziness/lightheadedness no  Confusion or unusual change in mental status no  Diarrhea yes, three times   Nausea no   Vomiting no  Shortness of breath or difficulty breathing no  Less urine output no  Cold, clammy, and pale skin no  Skin rash or skin color changes no    Medication:   New Medications at Discharge: none  Changed Medications at Discharge: Metformin 500 mg 1 tablet daily, Zofran 1 tablet every 8 hours as needed  Discontinued Medications at Discharge: Phenergan and Protonix (Not taking)  Current Outpatient Medications   Medication Sig    metFORMIN ER (GLUCOPHAGE XR) 500 mg tablet Take 1 Tab by mouth daily.  sucralfate (CARAFATE) 1 gram tablet Take 1 g by mouth four (4) times daily.  ondansetron (ZOFRAN ODT) 4 mg disintegrating tablet Take 1 Tab by mouth every eight (8) hours as needed for Nausea.  omeprazole (PRILOSEC) 40 mg capsule Take 1 Cap by mouth daily.  ONETOUCH ULTRA2 METER misc USE AS DIRECTED    ONETOUCH DELICA LANCETS 30 gauge misc USE AS DIRECTED    Blood-Glucose Meter monitoring kit 1 preferred brand glucometer for checking home glucose    lancets misc Use preferred brand; Check glucose 1 time daily, Diagnosis E11.65    glucose blood VI test strips (PHARMACIST CHOICE) strip Use preferred brand; Check glucose 1 times daily, Diagnosis E11.65    empagliflozin (JARDIANCE) 25 mg tablet Take 1 Tab by mouth daily.  gabapentin (NEURONTIN) 300 mg capsule TAKE 3 CAPS BY MOUTH TWO (2) TIMES A DAY.  aspirin delayed-release 81 mg tablet Take 81 mg by mouth daily.  famotidine (PEPCID) 20 mg tablet Take 20 mg by mouth daily.  sildenafil citrate (VIAGRA) 50 mg tablet Take 1 Tab by mouth as needed (ED).     tamsulosin (FLOMAX) 0.4 mg capsule TAKE ONE CAPSULE BY MOUTH EVERY EVENING    ramipril (ALTACE) 10 mg capsule Take 1 Cap by mouth nightly.  cyanocobalamin (VITAMIN B12) 1,000 mcg/mL injection INJECT CONTENTS OF ONE VIAL INTRAMUSCULARLY EVERY OTHER WEEK    alpha-D-galactosidase (BEANO PO) Take 1 Tab by mouth two (2) times a day.  calcium carbonate (TUMS) 200 mg calcium (500 mg) chew Take 2 Tabs by mouth three (3) times daily as needed (indigestion).  cholecalciferol, vitamin D3, (VITAMIN D3 PO) Take 1 Tab by mouth daily.  cetirizine (ZYRTEC) 10 mg tablet Take 10 mg by mouth nightly. No current facility-administered medications for this visit. Performed medication reconciliation with patient, and patient verbalizes understanding of administration of home medications. There were no barriers to obtaining medications identified at this time. Inpatient RRAT score: 15  Was this a readmission? no   Patient stated reason for the readmission: n/a    Barriers/Support system:  patient and wife    Barriers/Challenges to Care: []  Decline in memory    []  Language barrier     []  Emotional                  []  Limited mobility  []  Lack of motivation     [] Vision, hearing or cognitive impairment []  Knowledge [] Financial Barriers []  Lack of support  []  Pain []  Other [x]  None    CM Identified  Problems   (contributing problems for risk for readmission)  1. Risk for impaired health maintenance  2. Knowledge Deficit    Goals Addressed                 This Visit's Progress     Completes all FU appointments as ordered at [place]        · Assess patient's current effectiveness of condition self-management;  · Identify any barriers to FU appointments or care access;  · Determine patient's current access to PCP services - Patient has follow up with Dr. Abiodun Ashley on 12/2.  COMPLETED: Coordinate Pain Management Plan for Patient.         Intermittent stabbing pain/  No trigger   Hx of cholangiocarcinoma of biliary tract with Whippolvin   11/11  MD apt tomorrow with Valentino Lennox- GI.    11/18 - EUS 11/14;  Subsequent IP for pancreatitis [lipase >3000] 11/17.     11/21: Patient states his pain has resolved. Rated a \"0\" of 10.  Demonstrates no signs of complications or red flags in 30 days         Review and discuss importance of monitoring and reporting red flags;   Review medications and when taken with patient to ensure understanding;   Educate patient when to call the physician or 911;  · Assess for any barriers with care access or mobility needs at home              Discharge Instructions :  Reviewed discharge instructions with patient. Patient verbalizes understanding of discharge instructions and follow-up care. Advance Care Planning:   Patient was offered the opportunity to discuss advance care planning:  yes     Does patient have an Advance Directive:  no   If no, did you provide information on Caring Connections? yes     PCP/Specialist follow up: Patient scheduled to follow up with Armani Ham, DO on 12/2/19; CT 2/27 (moving up to last week in December). Future Appointments   Date Time Provider Sloane Roach   12/2/2019  3:30 PM [place], [address]   2/3/2020  8:30 AM Nell Pike MD RDE [address] [facility]   2/27/2020  9:00 AM [facility] CT 2 [facility]   2/28/2020  9:15 AM Brianna Peterson MD [facility]. [address]   7/27/2020 11:00 AM NIKI Brunner      Reviewed red flags with patient, and patient verbalizes understanding. Patient given an opportunity to ask questions. No other clinical/social/functional needs noted. The patient agrees to contact the PCP office for questions related to their healthcare. The patient expressed thanks, offered no additional questions and ended the call. Will call back on 11/27. Patient has follow ups made. Any red flags? Needs?

## 2019-11-27 ENCOUNTER — PATIENT OUTREACH (OUTPATIENT)
Dept: OTHER | Age: 64
End: 2019-11-27

## 2019-11-27 NOTE — PROGRESS NOTES
Follow up phone call to patient, two pt identifiers verified. Discussed patient's goals:   Goals      Completes all FU appointments as ordered at [place]      · Assess patient's current effectiveness of condition self-management;  · Identify any barriers to FU appointments or care access;  · Determine patient's current access to PCP services - Patient has follow up with Dr. Norman Garcia on 12/2.     11/27: PCP on 12/2. CT end of December, waiting to hear from hospital.       Demonstrates no signs of complications or red flags in 30 days       Review and discuss importance of monitoring and reporting red flags;   Review medications and when taken with patient to ensure understanding;   Educate patient when to call the physician or 911;  · Assess for any barriers with care access or mobility needs at home    11/27: No red flags. Patient's primary care provider relationship reviewed with patient and modified, as applicable.       Readiness to Change: []  Pre-contemplative    []  Contemplative  []  Preparation               [x]  Action                  []  Maintenance    Barriers/Challenges to Care: []  Decline in memory    []  Language barrier     []  Emotional                  []  Limited mobility  []  Lack of motivation     [] Vision, hearing or cognitive impairment []  Knowledge [] Financial Barriers []  Lack of support  []  Pain []  Other [x]  None    Key pt activities to achieve better health:   []  Weight loss  []  Improved diabetic control  []  Decreased cholesterol levels  []  Decreased blood pressure  [x]  Post op rest  []    Upcoming appointments:   Future Appointments   Date Time Provider Sloane Roach   12/2/2019  3:30 PM Chaya Coreas DO [address]   2/3/2020  8:30 AM Ari Bustillo MD RDE [address] [facility]   2/27/2020  9:00 AM [facility] CT 2 [facility]   2/28/2020  9:15 AM Willie Trent MD [facility]ESUS LA[facility]   7/27/2020 11:00 AM Polly Kincaid NP [facility] [address] for next call: Will call back in about two weeks, 12/11. Red flags? F/up? CT scheduled?

## 2019-12-02 ENCOUNTER — OFFICE VISIT (OUTPATIENT)
Dept: INTERNAL MEDICINE CLINIC | Age: 64
End: 2019-12-02

## 2019-12-02 VITALS
RESPIRATION RATE: 16 BRPM | TEMPERATURE: 98.7 F | WEIGHT: 170 LBS | HEIGHT: 68 IN | DIASTOLIC BLOOD PRESSURE: 83 MMHG | BODY MASS INDEX: 25.76 KG/M2 | HEART RATE: 80 BPM | OXYGEN SATURATION: 96 % | SYSTOLIC BLOOD PRESSURE: 123 MMHG

## 2019-12-02 DIAGNOSIS — K85.90 ACUTE PANCREATITIS WITHOUT INFECTION OR NECROSIS, UNSPECIFIED PANCREATITIS TYPE: Primary | ICD-10-CM

## 2019-12-02 DIAGNOSIS — C22.1 CHOLANGIOCARCINOMA OF BILIARY TRACT (HCC): ICD-10-CM

## 2019-12-02 DIAGNOSIS — Z23 ENCOUNTER FOR IMMUNIZATION: ICD-10-CM

## 2019-12-02 DIAGNOSIS — I10 ESSENTIAL HYPERTENSION: ICD-10-CM

## 2019-12-02 DIAGNOSIS — E11.9 CONTROLLED TYPE 2 DIABETES MELLITUS WITHOUT COMPLICATION, WITHOUT LONG-TERM CURRENT USE OF INSULIN (HCC): ICD-10-CM

## 2019-12-02 DIAGNOSIS — I48.0 PAROXYSMAL ATRIAL FIBRILLATION (HCC): ICD-10-CM

## 2019-12-02 PROBLEM — M35.00 SJOGREN'S DISEASE (HCC): Chronic | Status: RESOLVED | Noted: 2017-10-26 | Resolved: 2019-12-02

## 2019-12-02 NOTE — PROGRESS NOTES
Genet Lambert is a 62 y.o. male who presents for evaluation of hospital follow up. Last seen by me may 6, 2019. Still having issues with his gi system. Gets spells of n/v or bile material every few weeks on average, as well as intermittent spells of pancreatitis. Had CT done last month that suggested a retroperitoneal mass. EGD/EUS was done in an attempt to do bx, but bx was unable to be done. He then developed acute pancreatitis again, [facility] He will have repeat CT scan done end of December to further evaluate this possible mass. If the 'mass' has resolved, and was just inflammation, he hopes to have another sx done (eren en Y gastrojejunostomy) in an attempt to prevent further bile issues. For time being, he is stable. Still has some mild abd tenderness, primarily in ruq, not in LUQ at all. He has adjusted his diet significantly over past 6 weeks or so, and is eating very cautiously. He has lost 17 lbs since last visit with me in may. ROS:  Constitutional: negative for fevers, chills, anorexia and weight loss  Eyes:   negative for visual disturbance and irritation  ENT:   negative for tinnitus,sore throat,nasal congestion,ear pain,hoarseness  Respiratory:  negative for cough, hemoptysis, dyspnea,wheezing  CV:   negative for chest pain, palpitations, lower extremity edema  GI:   negative for nausea, vomiting, diarrhea, ++ mild ruq abdominal pain,melena  Genitourinary: negative for frequency, dysuria and hematuria  Musculoskel: negative for myalgias, arthralgias, back pain, muscle weakness, joint pain  Neurological:  negative for headaches, dizziness, focal weakness, numbness  Psychiatric:     Negative for depression or anxiety      Past Medical History:   Diagnosis Date    Arrhythmia     Previous a.fib, ablation, NSR now; NO LONGER FOLLOWED BY CARDIOLOGIST.     Autoimmune disease (HCC)     SJOGREN'S    Cancer ([facility] [address]     COMMON BILE DUCT, ADENOCARCINOMA    Diabetes ([facility] [address]     Family history of skin cancer     Hypertension     Sepsis ([facility] [address] 2017    STENTING TO BILE DUCT    Status post chemotherapy     Stroke [facility]) 2008    brain aneurysm - no deficits    Sun-damaged skin     Sunburn, blistering        Past Surgical History:   Procedure Laterality Date    ABDOMEN SURGERY PROC UNLISTED  10/2017    Whippolvin     HX GI      COLONOSCOPY, POLYPS (BENIGN)    HX GI  10/06/2017    [facility]iktor [facility]     [address]  2005    Ablation     HX ORTHOPAEDIC  2000    Spinal fusion W/ HARDWARE    HX OTHER SURGICAL  11/07/2017    [place] Cath, Dr. Guerrero Sessions  2017    PORTACATH - then removed    NEUROLOGICAL PROCEDURE UNLISTED  2005    Ting hole washout from cerebral hemorrhage       Family History   Problem Relation Age of Onset    Cancer Father         Breast and Colon    Cancer Mother         LEUKEMIA    No Known Problems Sister     No Known Problems Brother     No Known Problems Sister     Anesth Problems Neg Hx        Social History     Socioeconomic History    Marital status:      Spouse name: Not on file    Number of children: Not on file    Years of education: Not on file    Highest education level: Not on file   Occupational History    Not on file   Social Needs    Financial resource strain: Not on file    Food insecurity:     Worry: Not on file     Inability: Not on file    Transportation needs:     Medical: Not on file     Non-medical: Not on file   Tobacco Use    Smoking status: Never Smoker    Smokeless tobacco: Never Used   Substance and Sexual Activity    Alcohol use: Yes     Comment: very rare    Drug use: No    Sexual activity: Yes     Partners: Female   Lifestyle    Physical activity:     Days per week: Not on file     Minutes per session: Not on file    Stress: Not on file   Relationships    Social connections:     Talks on phone: Not on file     Gets together: Not on file     Attends Yazdanism service: Not on file     Active member of club or organization: Not on file     Attends meetings of clubs or organizations: Not on file     Relationship status: Not on file    Intimate partner violence:     Fear of current or ex partner: Not on file     Emotionally abused: Not on file     Physically abused: Not on file     Forced sexual activity: Not on file   Other Topics Concern    Not on file   Social History Narrative    Not on file            Visit Vitals  /83 (BP 1 Location: Left arm, BP Patient Position: Sitting)   Pulse 80   Temp 98.7 °F (37.1 °C) (Oral)   Resp 16   Ht 5' 8\" (1.727 m)   Wt 170 lb (77.1 kg)   SpO2 96%   BMI 25.85 kg/m²       Physical Examination:   General - Well appearing male  HEENT - PERRL, TM no erythema/opacification, normal nasal turbinates, no oropharyngeal erythema or exudate, MMM  Neck - supple, no bruits, no thyroidomegaly, no lymphadenopathy  Pulm - clear to auscultation bilaterally  Cardio - RRR, normal S1 S2, no murmur  Abd - soft, nontender, no masses, no HSM. Overall benign exam.  Extrem - no edema, +2 distal pulses  Neuro-  No focal deficits, CN intact     Assessment/Plan:    1. Recurrent pancreatitis--has repeat CT scan for end of December  2. Possible retroperitoneal mass--has repeat CT scan in a few weeks. 3.  Dm,type 2--on glucophage and jardiance  4.  bph with nocturia--continue flomax  5.  htn--controlled with altace  6. Cholangiocarcinoma--sp whipple procedure  7. pafib--remains nsr. Asked him to resume his 81 mg asa (was stopped before his EUS and possible bx)    1st shingrix given today.   rtc 4 months        Russell Blum III, DO

## 2019-12-02 NOTE — PATIENT INSTRUCTIONS
Office Policies Phone calls/patient messages: Please allow up to 24 hours for someone in the office to contact you about your call or message. Be mindful your provider may be out of the office or your message may require further review. We encourage you to use [employer] for your messages as this is a faster, more efficient way to communicate with our office Medication Refills: 
         
Prescription medications require 48-72 business hours to process. We encourage you to use [employer] for your refills. For controlled medications: Please allow 72 business hours to process. Certain medications may require you to  a written prescription at our office. NO narcotic/controlled medications will be prescribed after 4pm Monday through Friday or on weekends Form/Paperwork Completion: 
         
Please note a $25 fee may incur for all paperwork for completed by our providers. We ask that you allow 7-10 business days. Pre-payment is due prior to picking up/faxing the completed form. You may also download your forms to [employer] to have your doctor print off. Pancreatitis: Care Instructions Your Care Instructions The pancreas is an organ behind the stomach. It makes hormones and enzymes to help your body digest food. But if these enzymes attack the pancreas, it can get inflamed. This is called pancreatitis. Most cases are caused by gallstones or by heavy alcohol use. If you take care of yourself at home, it will help you get better. It will also help you avoid more problems with your pancreas. Follow-up care is a key part of your treatment and safety. Be sure to make and go to all appointments, and call your doctor if you are having problems. It's also a good idea to know your test results and keep a list of the medicines you take. How can you care for yourself at home? · Drink clear liquids and eat bland foods until you feel better.  [place] foods include rice, dry toast, and crackers. They also include bananas and applesauce. · Eat a low-fat diet until your doctor says your pancreas is healed. · Do not drink alcohol. Tell your doctor if you need help to quit. Counseling, support groups, and sometimes medicines can help you stay sober. · Be safe with medicines. Read and follow all instructions on the label. ? If the doctor gave you a prescription medicine for pain, take it as prescribed. ? If you are not taking a prescription pain medicine, ask your doctor if you can take an over-the-counter medicine. · If your doctor prescribed antibiotics, take them as directed. Do not stop taking them just because you feel better. You need to take the full course of antibiotics. · Get extra rest until you feel better. To prevent future problems with your pancreas · Do not drink alcohol. · Tell your doctors and pharmacist that you've had pancreatitis. They can help you avoid medicines that may cause this problem again. When should you call for help? Call 911 anytime you think you may need emergency care. For example, call if: 
  · You vomit blood or what looks like coffee grounds.  
  · Your stools are maroon or very bloody.  
 Call your doctor now or seek immediate medical care if: 
  · You have new or worse belly pain.  
  · Your stools are black and look like tar, or they have streaks of blood.  
  · You are vomiting.  
 Watch closely for changes in your health, and be sure to contact your doctor if: 
  · You do not get better as expected. Where can you learn more? Go to http://sandra-jenelle.info/. Enter I276 in the search box to learn more about \"Pancreatitis: Care Instructions. \" Current as of: November 7, 2018 Content Version: 12.2 © 7739-4923 [employer].  Care instructions adapted under license by [employer] (which disclaims liability or warranty for this information). If you have questions about a medical condition or this instruction, always ask your healthcare professional. [place] [address] any warranty or liability for your use of this information. Resume 81 mg aspirin each day. You got the 1st shingrix vaccine today. Will need the 2nd shot in 2-6 months.

## 2019-12-04 ENCOUNTER — APPOINTMENT (OUTPATIENT)
Dept: CT IMAGING | Age: 64
DRG: 440 | End: 2019-12-04
Attending: STUDENT IN AN ORGANIZED HEALTH CARE EDUCATION/TRAINING PROGRAM
Payer: COMMERCIAL

## 2019-12-04 ENCOUNTER — HOSPITAL ENCOUNTER (INPATIENT)
Age: 64
LOS: 2 days | Discharge: HOME | DRG: 440 | End: 2019-12-06
Attending: STUDENT IN AN ORGANIZED HEALTH CARE EDUCATION/TRAINING PROGRAM | Admitting: INTERNAL MEDICINE
Payer: COMMERCIAL

## 2019-12-04 DIAGNOSIS — K85.90 ACUTE PANCREATITIS, UNSPECIFIED COMPLICATION STATUS, UNSPECIFIED PANCREATITIS TYPE: Primary | ICD-10-CM

## 2019-12-04 LAB
ALBUMIN SERPL-MCNC: 3.9 G/DL (ref 3.5–5)
ALBUMIN/GLOB SERPL: 1.3 {RATIO} (ref 1.1–2.2)
ALP SERPL-CCNC: 148 U/L (ref 45–117)
ALT SERPL-CCNC: 28 U/L (ref 12–78)
ANION GAP SERPL CALC-SCNC: 9 MMOL/L (ref 5–15)
APPEARANCE UR: CLEAR
AST SERPL-CCNC: 13 U/L (ref 15–37)
BACTERIA URNS QL MICRO: NEGATIVE /HPF
BASOPHILS # BLD: 0 K/UL (ref 0–0.1)
BASOPHILS NFR BLD: 0 % (ref 0–1)
BILIRUB SERPL-MCNC: 1 MG/DL (ref 0.2–1)
BILIRUB UR QL: NEGATIVE
BUN SERPL-MCNC: 18 MG/DL (ref 6–20)
BUN/CREAT SERPL: 19 (ref 12–20)
CALCIUM SERPL-MCNC: 9.1 MG/DL (ref 8.5–10.1)
CHLORIDE SERPL-SCNC: 107 MMOL/L (ref 97–108)
CO2 SERPL-SCNC: 25 MMOL/L (ref 21–32)
COLOR UR: ABNORMAL
COMMENT, HOLDF: NORMAL
CREAT SERPL-MCNC: 0.97 MG/DL (ref 0.7–1.3)
DIFFERENTIAL METHOD BLD: ABNORMAL
EOSINOPHIL # BLD: 0.1 K/UL (ref 0–0.4)
EOSINOPHIL NFR BLD: 1 % (ref 0–7)
EPITH CASTS URNS QL MICRO: ABNORMAL /LPF
ERYTHROCYTE [DISTWIDTH] IN BLOOD BY AUTOMATED COUNT: 14.3 % (ref 11.5–14.5)
GLOBULIN SER CALC-MCNC: 2.9 G/DL (ref 2–4)
GLUCOSE BLD STRIP.AUTO-MCNC: 122 MG/DL (ref 65–100)
GLUCOSE BLD STRIP.AUTO-MCNC: 135 MG/DL (ref 65–100)
GLUCOSE BLD STRIP.AUTO-MCNC: 95 MG/DL (ref 65–100)
GLUCOSE SERPL-MCNC: 189 MG/DL (ref 65–100)
GLUCOSE UR STRIP.AUTO-MCNC: >1000 MG/DL
HCT VFR BLD AUTO: 49.2 % (ref 36.6–50.3)
HGB BLD-MCNC: 15.7 G/DL (ref 12.1–17)
HGB UR QL STRIP: NEGATIVE
HYALINE CASTS URNS QL MICRO: ABNORMAL /LPF (ref 0–5)
IMM GRANULOCYTES # BLD AUTO: 0.1 K/UL (ref 0–0.04)
IMM GRANULOCYTES NFR BLD AUTO: 1 % (ref 0–0.5)
INR PPP: 1 (ref 0.9–1.1)
KETONES UR QL STRIP.AUTO: NEGATIVE MG/DL
LEUKOCYTE ESTERASE UR QL STRIP.AUTO: NEGATIVE
LIPASE SERPL-CCNC: >3000 U/L (ref 73–393)
LYMPHOCYTES # BLD: 0.6 K/UL (ref 0.8–3.5)
LYMPHOCYTES NFR BLD: 4 % (ref 12–49)
MCH RBC QN AUTO: 29.3 PG (ref 26–34)
MCHC RBC AUTO-ENTMCNC: 31.9 G/DL (ref 30–36.5)
MCV RBC AUTO: 91.8 FL (ref 80–99)
MONOCYTES # BLD: 1 K/UL (ref 0–1)
MONOCYTES NFR BLD: 7 % (ref 5–13)
NEUTS SEG # BLD: 12.9 K/UL (ref 1.8–8)
NEUTS SEG NFR BLD: 87 % (ref 32–75)
NITRITE UR QL STRIP.AUTO: NEGATIVE
NRBC # BLD: 0 K/UL (ref 0–0.01)
NRBC BLD-RTO: 0 PER 100 WBC
PH UR STRIP: 5.5 [PH] (ref 5–8)
PLATELET # BLD AUTO: 141 K/UL (ref 150–400)
PMV BLD AUTO: 10.9 FL (ref 8.9–12.9)
POTASSIUM SERPL-SCNC: 3.5 MMOL/L (ref 3.5–5.1)
PROT SERPL-MCNC: 6.8 G/DL (ref 6.4–8.2)
PROT UR STRIP-MCNC: NEGATIVE MG/DL
PROTHROMBIN TIME: 10.6 SEC (ref 9–11.1)
RBC # BLD AUTO: 5.36 M/UL (ref 4.1–5.7)
RBC #/AREA URNS HPF: ABNORMAL /HPF (ref 0–5)
RBC MORPH BLD: ABNORMAL
SAMPLES BEING HELD,HOLD: NORMAL
SERVICE CMNT-IMP: ABNORMAL
SERVICE CMNT-IMP: ABNORMAL
SERVICE CMNT-IMP: NORMAL
SODIUM SERPL-SCNC: 141 MMOL/L (ref 136–145)
SP GR UR REFRACTOMETRY: 1.01 (ref 1–1.03)
UR CULT HOLD, URHOLD: NORMAL
UROBILINOGEN UR QL STRIP.AUTO: 1 EU/DL (ref 0.2–1)
WBC # BLD AUTO: 14.7 K/UL (ref 4.1–11.1)
WBC URNS QL MICRO: ABNORMAL /HPF (ref 0–4)

## 2019-12-04 PROCEDURE — 85025 COMPLETE CBC W/AUTO DIFF WBC: CPT

## 2019-12-04 PROCEDURE — 81001 URINALYSIS AUTO W/SCOPE: CPT

## 2019-12-04 PROCEDURE — 83690 ASSAY OF LIPASE: CPT

## 2019-12-04 PROCEDURE — 74011250636 HC RX REV CODE- 250/636: Performed by: INTERNAL MEDICINE

## 2019-12-04 PROCEDURE — 80053 COMPREHEN METABOLIC PANEL: CPT

## 2019-12-04 PROCEDURE — 65270000032 HC RM SEMIPRIVATE

## 2019-12-04 PROCEDURE — 74011250636 HC RX REV CODE- 250/636: Performed by: NURSE PRACTITIONER

## 2019-12-04 PROCEDURE — 96374 THER/PROPH/DIAG INJ IV PUSH: CPT

## 2019-12-04 PROCEDURE — 74011000258 HC RX REV CODE- 258: Performed by: RADIOLOGY

## 2019-12-04 PROCEDURE — 36415 COLL VENOUS BLD VENIPUNCTURE: CPT

## 2019-12-04 PROCEDURE — 82962 GLUCOSE BLOOD TEST: CPT

## 2019-12-04 PROCEDURE — 74011000250 HC RX REV CODE- 250: Performed by: INTERNAL MEDICINE

## 2019-12-04 PROCEDURE — 74011636320 HC RX REV CODE- 636/320: Performed by: RADIOLOGY

## 2019-12-04 PROCEDURE — 74177 CT ABD & PELVIS W/CONTRAST: CPT

## 2019-12-04 PROCEDURE — 99284 EMERGENCY DEPT VISIT MOD MDM: CPT

## 2019-12-04 PROCEDURE — 85610 PROTHROMBIN TIME: CPT

## 2019-12-04 PROCEDURE — 96375 TX/PRO/DX INJ NEW DRUG ADDON: CPT

## 2019-12-04 PROCEDURE — 74011250636 HC RX REV CODE- 250/636: Performed by: STUDENT IN AN ORGANIZED HEALTH CARE EDUCATION/TRAINING PROGRAM

## 2019-12-04 RX ORDER — SODIUM CHLORIDE 0.9 % (FLUSH) 0.9 %
5-40 SYRINGE (ML) INJECTION EVERY 8 HOURS
Status: DISCONTINUED | OUTPATIENT
Start: 2019-12-04 | End: 2019-12-06 | Stop reason: HOSPADM

## 2019-12-04 RX ORDER — ONDANSETRON 2 MG/ML
4 INJECTION INTRAMUSCULAR; INTRAVENOUS
Status: DISCONTINUED | OUTPATIENT
Start: 2019-12-04 | End: 2019-12-06 | Stop reason: HOSPADM

## 2019-12-04 RX ORDER — MAGNESIUM SULFATE 100 %
4 CRYSTALS MISCELLANEOUS AS NEEDED
Status: DISCONTINUED | OUTPATIENT
Start: 2019-12-04 | End: 2019-12-06 | Stop reason: HOSPADM

## 2019-12-04 RX ORDER — SODIUM CHLORIDE 9 MG/ML
75 INJECTION, SOLUTION INTRAVENOUS CONTINUOUS
Status: DISCONTINUED | OUTPATIENT
Start: 2019-12-04 | End: 2019-12-06 | Stop reason: HOSPADM

## 2019-12-04 RX ORDER — TRAMADOL HYDROCHLORIDE 50 MG/1
50 TABLET ORAL
COMMUNITY
End: 2020-01-17 | Stop reason: SDUPTHER

## 2019-12-04 RX ORDER — HYDROMORPHONE HYDROCHLORIDE 1 MG/ML
1 INJECTION, SOLUTION INTRAMUSCULAR; INTRAVENOUS; SUBCUTANEOUS ONCE
Status: COMPLETED | OUTPATIENT
Start: 2019-12-04 | End: 2019-12-04

## 2019-12-04 RX ORDER — SODIUM CHLORIDE 0.9 % (FLUSH) 0.9 %
5-40 SYRINGE (ML) INJECTION AS NEEDED
Status: DISCONTINUED | OUTPATIENT
Start: 2019-12-04 | End: 2019-12-06 | Stop reason: HOSPADM

## 2019-12-04 RX ORDER — HYDROMORPHONE HYDROCHLORIDE 1 MG/ML
1 INJECTION, SOLUTION INTRAMUSCULAR; INTRAVENOUS; SUBCUTANEOUS
Status: DISCONTINUED | OUTPATIENT
Start: 2019-12-04 | End: 2019-12-06 | Stop reason: HOSPADM

## 2019-12-04 RX ORDER — SODIUM CHLORIDE 0.9 % (FLUSH) 0.9 %
10 SYRINGE (ML) INJECTION
Status: COMPLETED | OUTPATIENT
Start: 2019-12-04 | End: 2019-12-04

## 2019-12-04 RX ORDER — ONDANSETRON 2 MG/ML
4 INJECTION INTRAMUSCULAR; INTRAVENOUS
Status: COMPLETED | OUTPATIENT
Start: 2019-12-04 | End: 2019-12-04

## 2019-12-04 RX ORDER — INSULIN LISPRO 100 [IU]/ML
INJECTION, SOLUTION INTRAVENOUS; SUBCUTANEOUS EVERY 6 HOURS
Status: DISCONTINUED | OUTPATIENT
Start: 2019-12-04 | End: 2019-12-06 | Stop reason: HOSPADM

## 2019-12-04 RX ORDER — DEXTROSE MONOHYDRATE 100 MG/ML
0-250 INJECTION, SOLUTION INTRAVENOUS AS NEEDED
Status: DISCONTINUED | OUTPATIENT
Start: 2019-12-04 | End: 2019-12-06 | Stop reason: HOSPADM

## 2019-12-04 RX ORDER — HEPARIN SODIUM 5000 [USP'U]/ML
5000 INJECTION, SOLUTION INTRAVENOUS; SUBCUTANEOUS EVERY 8 HOURS
Status: DISCONTINUED | OUTPATIENT
Start: 2019-12-04 | End: 2019-12-06 | Stop reason: HOSPADM

## 2019-12-04 RX ADMIN — Medication 10 ML: at 13:14

## 2019-12-04 RX ADMIN — Medication 10 ML: at 20:43

## 2019-12-04 RX ADMIN — Medication 10 ML: at 09:32

## 2019-12-04 RX ADMIN — SODIUM CHLORIDE 125 ML/HR: 900 INJECTION, SOLUTION INTRAVENOUS at 10:18

## 2019-12-04 RX ADMIN — HYDROMORPHONE HYDROCHLORIDE 1 MG: 1 INJECTION, SOLUTION INTRAMUSCULAR; INTRAVENOUS; SUBCUTANEOUS at 14:28

## 2019-12-04 RX ADMIN — ONDANSETRON 4 MG: 2 INJECTION INTRAMUSCULAR; INTRAVENOUS at 14:50

## 2019-12-04 RX ADMIN — FAMOTIDINE 20 MG: 10 INJECTION, SOLUTION INTRAVENOUS at 20:43

## 2019-12-04 RX ADMIN — ONDANSETRON 4 MG: 2 INJECTION INTRAMUSCULAR; INTRAVENOUS at 08:45

## 2019-12-04 RX ADMIN — HYDROMORPHONE HYDROCHLORIDE 1 MG: 1 INJECTION, SOLUTION INTRAMUSCULAR; INTRAVENOUS; SUBCUTANEOUS at 08:45

## 2019-12-04 RX ADMIN — IOPAMIDOL 100 ML: 755 INJECTION, SOLUTION INTRAVENOUS at 09:32

## 2019-12-04 RX ADMIN — SODIUM CHLORIDE, SODIUM LACTATE, POTASSIUM CHLORIDE, AND CALCIUM CHLORIDE 1000 ML: 600; 310; 30; 20 INJECTION, SOLUTION INTRAVENOUS at 08:47

## 2019-12-04 RX ADMIN — SODIUM CHLORIDE 125 ML/HR: 900 INJECTION, SOLUTION INTRAVENOUS at 18:08

## 2019-12-04 RX ADMIN — SODIUM CHLORIDE 100 ML: 900 INJECTION, SOLUTION INTRAVENOUS at 09:32

## 2019-12-04 RX ADMIN — SODIUM CHLORIDE, SODIUM LACTATE, POTASSIUM CHLORIDE, AND CALCIUM CHLORIDE 1000 ML: 600; 310; 30; 20 INJECTION, SOLUTION INTRAVENOUS at 09:33

## 2019-12-04 RX ADMIN — HEPARIN SODIUM 5000 UNITS: 5000 INJECTION INTRAVENOUS; SUBCUTANEOUS at 18:05

## 2019-12-04 RX ADMIN — HEPARIN SODIUM 5000 UNITS: 5000 INJECTION INTRAVENOUS; SUBCUTANEOUS at 10:18

## 2019-12-04 NOTE — DISCHARGE INSTRUCTIONS
Patient Education        Learning About Acute Pancreatitis  What is acute pancreatitis? The pancreas is an organ behind the stomach. It makes hormones like insulin that help control how your body uses sugar. It also makes enzymes that help you digest food. Usually these enzymes flow from the pancreas to the intestines. But if they leak into the pancreas, they can irritate it and cause pain and swelling. When this happens suddenly, it's called acute pancreatitis. What causes it? Most of the time, acute pancreatitis is caused by gallstones or by heavy alcohol use. But several other things can cause it, such as:  · High levels of fats in the blood. · An injury. · A problem after a surgery or a procedure. · Certain medicines. What are the symptoms? The main symptom is pain in the upper belly. The pain can be severe. You also may have a fever, nausea, or vomiting. Some people get so sick that they have problems breathing. They also may have low blood pressure. How is it diagnosed? Your doctor will diagnose pancreatitis with an exam and by looking at your lab tests. Your doctor may think that you have this problem based on your symptoms and where you have pain in your belly. You may have blood tests of enzymes called amylase and lipase. In pancreatitis, the level of these enzymes is usually much higher than normal.  You also may have imaging tests of the belly. These may include an ultrasound, a CT scan, or an MRI. A special MRI called MRCP can show images of the bile ducts. This test can be very helpful when gallstones are causing the problem. How is it treated? Treatment of acute pancreatitis usually takes place in the hospital. It focuses on taking care of pain and supporting your body while your pancreas heals. In severe cases, treatment may occur in an intensive care unit to support breathing, treat serious infections, or help raise very low blood pressure.   If a gallstone is causing the problem, the gallstone may need to be removed. This is done during a procedure called ERCP. The doctor puts a scope in your mouth and moves it gently through the stomach and into the ducts from the pancreas and gallbladder. The doctor is then able to see a stone and remove it. Sometimes the gallbladder, which makes gallstones, needs to be removed by surgery. People with pancreatitis often need a lot of fluid to help support their other organs and their blood pressure. They get fluids through a vein (intravenous, or IV). Instead of food by mouth, nutrition is sometimes given through a vein while the pancreas heals. Follow-up care is a key part of your treatment and safety. Be sure to make and go to all appointments, and call your doctor if you are having problems. It's also a good idea to know your test results and keep a list of the medicines you take. Where can you learn more? Go to http://sandra-jenelle.info/. Enter G816 in the search box to learn more about \"Learning About Acute Pancreatitis. \"  Current as of: November 7, 2018  Content Version: 12.2  © 9750-1397 [employer]. Care instructions adapted under license by [employer] (which disclaims liability or warranty for this information). If you have questions about a medical condition or this instruction, always ask your healthcare professional. [address] any warranty or liability for your use of this information. Please bring this form with you to show your primary care provider at your follow-up appointment.     Primary care provider:  Dr. Obinna Wesley DO    Discharging provider:  Gely Frausto MD    You have been admitted to the hospital with the following diagnoses:  · Acute pancreatitis [K85.90]    FOLLOW-UP CARE RECOMMENDATIONS:    APPOINTMENTS:  · Follow-up with primary care provider, Dr. Obinna Wesley,   -  Please call [phone] shortly after discharge to set up an appointment to be seen in  1 week   · Gastroenterology in 2 weeks     FOLLOW-UP TESTS recommended: none    PENDING TEST RESULTS:  At the time of your discharge the following test results are still pending: none  Please make sure you review these results with your outpatient follow-up provider(s). SYMPTOMS to watch for: chest pain, shortness of breath, fever, chills, nausea, vomiting, diarrhea, change in mentation, falling, weakness, bleeding. DIET/what to eat:  Resume previous diet    ACTIVITY:  Activity as tolerated    What to do if new or unexpected symptoms occur? If you experience any of the above symptoms (or should other concerns or questions arise after discharge) please call your primary care physician. Return to the emergency room if you cannot get hold of your doctor. · It is very important that you keep your follow-up appointment(s). · Please bring discharge papers, medication list (and/or medication bottles) to your follow-up appointments for review by your outpatient provider(s). · Please check the list of medications and be sure it includes every medication (even non-prescription medications) that your provider wants you to take. · It is important that you take the medication exactly as they are prescribed. · Keep your medication in the bottles provided by the pharmacist and keep a list of the medication names, dosages, and times to be taken in your wallet. · Do not take other medications without consulting your doctor. · If you have any questions about your medications or other instructions, please talk to your nurse or care provider before you leave the hospital.    I understand that if any problems occur once I am at home I am to contact my physician. These instructions were explained to me and I had the opportunity to ask questions.

## 2019-12-04 NOTE — PROGRESS NOTES
Admission Medication Reconciliation:    Information obtained from:  Patient  RxQuery data available¹:  YES    Comments/Recommendations: Updated PTA meds/reviewed patient's allergies. 1)  Patient is compliant with his medications at home. Patient states he hasn't been taking his baby aspirin and gabapentin. 2)  Medication changes (since last review): Added  - Tramadol 50mg 1tab po q6h as needed    Adjusted  - Metformin ER 500mg 1tab po daily --> BID    Removed  - Aspirin DR 81mg 1tab po daily  - Gabapentin 300mg 3caps po BID     ¹RxQuery pharmacy benefit data reflects medications filled and processed through the patient's insurance, however   this data does NOT capture whether the medication was picked up or is currently being taken by the patient. Allergies:  Shellfish derived and Pcn [penicillins]    Significant PMH/Disease States:   Past Medical History:   Diagnosis Date    Arrhythmia     Previous a.fib, ablation, NSR now; NO LONGER FOLLOWED BY CARDIOLOGIST. Autoimmune disease ([facility]     SJOGREN'S    Cancer ([facility] [address]     COMMON BILE DUCT, ADENOCARCINOMA    Diabetes ([facility] [address]     Family history of skin cancer     Hypertension     Sepsis ([facility] [address] 2017    STENTING TO BILE DUCT    Status post chemotherapy     Stroke [facility]) 2008    brain aneurysm - no deficits    Sun-damaged skin     Sunburn, blistering      Chief Complaint for this Admission:    Chief Complaint   Patient presents with    Abdominal Pain    Vomiting     Prior to Admission Medications:   Prior to Admission Medications   Prescriptions Last Dose Informant Patient Reported? Taking? alpha-D-galactosidase (BEANO PO) 12/3/2019 at Unknown time Self Yes Yes   Sig: Take 1 Tab by mouth two (2) times a day. cetirizine (ZYRTEC) 10 mg tablet 12/3/2019 at Unknown time Self Yes Yes   Sig: Take 10 mg by mouth nightly.    cyanocobalamin (VITAMIN B12) 1,000 mcg/mL injection 11/15/2019  No Yes   Sig: INJECT CONTENTS OF ONE VIAL INTRAMUSCULARLY EVERY OTHER WEEK empagliflozin (JARDIANCE) 25 mg tablet 12/3/2019 at Unknown time  No Yes   Sig: Take 1 Tab by mouth daily. metFORMIN ER (GLUCOPHAGE XR) 500 mg tablet 12/3/2019 at Unknown time  No Yes   Sig: Take 1 Tab by mouth daily. omeprazole (PRILOSEC) 40 mg capsule 12/3/2019 at Unknown time  No Yes   Sig: Take 1 Cap by mouth daily. ondansetron (ZOFRAN ODT) 4 mg disintegrating tablet   No No   Sig: Take 1 Tab by mouth every eight (8) hours as needed for Nausea. ramipril (ALTACE) 10 mg capsule 12/3/2019 at Unknown time  No Yes   Sig: Take 1 Cap by mouth nightly. sildenafil citrate (VIAGRA) 50 mg tablet   No No   Sig: Take 1 Tab by mouth as needed (ED). sucralfate (CARAFATE) 1 gram tablet 12/3/2019 at Unknown time  Yes Yes   Sig: Take 1 g by mouth four (4) times daily. tamsulosin (FLOMAX) 0.4 mg capsule 12/3/2019 at Unknown time  Yes Yes   Sig: TAKE ONE CAPSULE BY MOUTH EVERY EVENING      Facility-Administered Medications: None       Please contact the main inpatient pharmacy with any questions or concerns at [phone] and we will direct you to the clinical pharmacist covering this patient's care while in-house.    Yasir Rock, PHARMD

## 2019-12-04 NOTE — ED PROVIDER NOTES
62 y.o. male with past medical history significant for HTN, A-fib, Sjogren's disease, stroke, DM, pancreatitis, and common bile duct adenocarcinoma who presents via POV with chief complaint of abdominal pain. Pt c/o constant, sharp pain to the center of his epigastric region that began today at 0300, accompanied by nausea, and 1 episode of vomiting. Pt was ok last night and ate dinner normally. He did not eat anything yet today. He was recently hospitalized for pancreatitis (11/17/19-11/20/19). He denies diarrhea, yellowing of skin, and leg swelling. There are no other acute medical concerns at this time. Social hx: denies tobacco use, endorses rare EtOH use, denies illicit drug use. PCP: Ariadna Peters DO      Note written by Michelle Francis, as dictated by Segundo Bragg MD 8:35 AM      The history is provided by the patient. No  was used. Past Medical History:   Diagnosis Date    Arrhythmia     Previous a.fib, ablation, NSR now; NO LONGER FOLLOWED BY CARDIOLOGIST.     Autoimmune disease ([address]     SJOGREN'S    Cancer ([address]     COMMON BILE DUCT, ADENOCARCINOMA    Diabetes ([address]     Family history of skin cancer     Hypertension     Sepsis ([address] 2017    STENTING TO BILE DUCT    Status post chemotherapy     Stroke [facility]) 2008    brain aneurysm - no deficits    Sun-damaged skin     Sunburn, blistering        Past Surgical History:   Procedure Laterality Date    ABDOMEN SURGERY PROC UNLISTED  10/2017    [place]     HX GI      COLONOSCOPY, POLYPS (BENIGN)    HX GI  10/06/2017    Viktor Mullen [facility]     [address]  2005    Ablation     HX ORTHOPAEDIC  2000    Spinal fusion W/ HARDWARE    HX OTHER SURGICAL  11/07/2017    [place], Dr. Valeria Rogers  2017    PORTACATH - then removed    NEUROLOGICAL PROCEDURE UNLISTED  2005    [place] washout from cerebral hemorrhage         Family History:   Problem Relation Age of Onset    Cancer Father         Breast and Colon    Cancer Mother         LEUKEMIA    No Known Problems Sister     No Known Problems Brother     No Known Problems Sister     Anesth Problems Neg Hx        Social History     Socioeconomic History    Marital status:      Spouse name: Not on file    Number of children: Not on file    Years of education: Not on file    Highest education level: Not on file   Occupational History    Not on file   Social Needs    Financial resource strain: Not on file    Food insecurity:     Worry: Not on file     Inability: Not on file    Transportation needs:     Medical: Not on file     Non-medical: Not on file   Tobacco Use    Smoking status: Never Smoker    Smokeless tobacco: Never Used   Substance and Sexual Activity    Alcohol use: Yes     Comment: very rare    Drug use: No    Sexual activity: Yes     Partners: Female   Lifestyle    Physical activity:     Days per week: Not on file     Minutes per session: Not on file    Stress: Not on file   Relationships    Social connections:     Talks on phone: Not on file     Gets together: Not on file     Attends Rastafari service: Not on file     Active member of club or organization: Not on file     Attends meetings of clubs or organizations: Not on file     Relationship status: Not on file    Intimate partner violence:     Fear of current or ex partner: Not on file     Emotionally abused: Not on file     Physically abused: Not on file     Forced sexual activity: Not on file   Other Topics Concern    Not on file   Social History Narrative    Not on file         ALLERGIES: Shellfish derived and Pcn [penicillins]    Review of Systems   Constitutional: Negative for fever. Eyes: Negative for visual disturbance. Respiratory: Negative for shortness of breath. Cardiovascular: Negative for leg swelling. Gastrointestinal: Positive for abdominal pain, nausea and vomiting. Negative for diarrhea.    Skin: Negative for color change. All other systems reviewed and are negative. Vitals:    12/04/19 0813   BP: 95/53   Pulse: 79   Resp: 20   Temp: 97.5 °F (36.4 °C)   SpO2: 97%   Weight: 77.1 kg (170 lb)   Height: 5' 8\" (1.727 m)            Physical Exam  Vitals signs and nursing note reviewed. Constitutional:       Appearance: He is well-developed. He is not diaphoretic. Comments: Moderate distress secondary to pain   HENT:      Head: Normocephalic and atraumatic. Neck:      Musculoskeletal: No neck rigidity. Pulmonary:      Effort: Pulmonary effort is normal.   Abdominal:      General: There is no distension. Palpations: Abdomen is soft. Tenderness: There is generalized tenderness and tenderness in the epigastric area. Skin:     General: Skin is warm. Neurological:      Mental Status: He is alert. Cranial Nerves: No cranial nerve deficit. Note written by Michelle Ugalde, as dictated by Jorden Mckeon MD 8:40 AM      MDM  Number of Diagnoses or Management Options  Acute pancreatitis, unspecified complication status, unspecified pancreatitis type:   Diagnosis management comments: Emir Taylor is a 62 y.o. male presenting with recurrent epigastric abdominal pain, nausea and vomiting. No GI bleed. Patient is hemodynamically stable. Lipase greater than 3000. Has history of pancreatitis in the setting of stricture, cbd adenocarcenoma, will likely need ERCP. Given IV fluids, IV analgesics and antiemetic. Plan to admit.   Hospitalist Kwabena Serve for Admission  9:14 AM    ED Room Number: R31/R31  Patient Name and age:  Emir Taylor 62 y.o.  male  Working Diagnosis: Acute pancreatitis, unspecified complication status, unspecified pancreatitis type  (primary encounter diagnosis)  Readmission: yes  Isolation Requirements:  no  Recommended Level of Care:  med/surg  Code Status:  Full Code  Department:[facility] Adult ED - [phone]  Other: Procedures

## 2019-12-04 NOTE — PROGRESS NOTES
.. TRANSFER - IN REPORT:    Verbal report received from [address], RN(name) on [address]  being received from ER(unit) for routine progression of care      Report consisted of patients Situation, Background, Assessment and   Recommendations(SBAR). Information from the following report(s) SBAR, Kardex, ED Summary and MAR was reviewed with the receiving nurse. Opportunity for questions and clarification was provided. Assessment completed upon patients arrival to unit and care assumed.

## 2019-12-04 NOTE — ROUTINE PROCESS
TRANSFER - OUT REPORT: 
 
Verbal report given to ESVIN Reich(name) on Mary Alice Conradfer  being transferred to (unit) for routine progression of care Report consisted of patients Situation, Background, Assessment and  
Recommendations(SBAR). Information from the following report(s) SBAR, ED Summary, [facility] and Recent Results was reviewed with the receiving nurse. Lines:  
Peripheral IV 12/04/19 Left Antecubital (Active) Site Assessment Clean, dry, & intact 12/4/2019  8:20 AM  
Phlebitis Assessment 0 12/4/2019  8:20 AM  
Infiltration Assessment 0 12/4/2019  8:20 AM  
Dressing Status Clean, dry, & intact 12/4/2019  8:20 AM  
  
 
Opportunity for questions and clarification was provided. Patient transported with: 
 [email or web address]

## 2019-12-04 NOTE — ED TRIAGE NOTES
Pt reports LUQ pain that radiates to epigastric region that started suddenly at 0300 this am with accompanying n/v. Pt has hx of pancreatitis. Recently admitted for Pancreatitis 3 weeks ago.

## 2019-12-04 NOTE — CONSULTS
[address]  [address]  [place], [address]  [phone]                     GI CONSULTATION NOTE  Jackey Kawasaki, AGACNP-BC  Work Cell: [phone]      NAME:  Neville Mckeon   :   1955   MRN:   [id number]       Referring Provider: Dr. Nancie Varma Date: 2019     Chief Complaint: Abdominal pain    History of Present Illness:  Patient is a 62 y.o. who is seen in consultation at the request of Dr. Carlyle Ramirez for pancreatitis. Mr. Corina Saucedo has a PMH as detailed below including hx cholangiocarcinoma s/p pylorus-preserving Whipple 10/2017. He presented to the hospital with recurrent epigastric pain. Onset was this AM. It is severe and sharp in nature w/ associated n/v. He was recently admitted for the same. Work-up revealing elevated lipase > 3000 and CT showed acute uncomplicated pancreatitis. Recent previous CT  showed increased retroperitoneal soft tissue concerning for possible tumor recurrence. EGD/EUS  showed a small HH, moderate gastropathy w/ bile and gastric nodule. Bx unable to be done during procedure. There was tentative plans for CT-guided bx; however imaging reviewed by IR during last admission w/ suspicion that soft tissue abnormality seen on CT may represent inflammatory phelgmon. Repeat CT today noting fatty infiltration but not describing soft tissue mass previously seen. PMH:  Past Medical History:   Diagnosis Date    Arrhythmia     Previous a.fib, ablation, NSR now; NO LONGER FOLLOWED BY CARDIOLOGIST.     Autoimmune disease ([address]     SJOGREN'S    Cancer ([address]     COMMON BILE DUCT, ADENOCARCINOMA    Diabetes ([address]     Family history of skin cancer     Hypertension     Sepsis ([address] 2017    STENTING TO BILE DUCT    Status post chemotherapy     Stroke [facility]) 2008    brain aneurysm - no deficits    Sun-damaged skin     Sunburn, blistering        PSH:  Past Surgical History:   Procedure Laterality Date    ABDOMEN SURGERY PROC UNLISTED  10/2017    [facility]      COLONOSCOPY, POLYPS (BENIGN)    HX GI  10/06/2017    Viktor Mullen [facility]     [address]  2005    Ablation     HX ORTHOPAEDIC  2000    Spinal fusion W/ HARDWARE    HX OTHER SURGICAL  11/07/2017    [place] Cath, Dr. Valeria Rogers  2017    PORTACATH - then removed    NEUROLOGICAL PROCEDURE UNLISTED  2005    Ting hole washout from cerebral hemorrhage       Allergies: Allergies   Allergen Reactions    Shellfish Derived Anaphylaxis     Soft shell crabs    Pcn [Penicillins] Other (comments)     Pt stated \"always been told PCN\"  Pt tolerated other cephalosporins in the past       Home Medications:  Prior to Admission Medications   Prescriptions Last Dose Informant Patient Reported? Taking? alpha-D-galactosidase (BEANO PO) 12/3/2019 at Unknown time Self Yes Yes   Sig: Take 1 Tab by mouth two (2) times a day. cetirizine (ZYRTEC) 10 mg tablet 12/3/2019 at Unknown time Self Yes Yes   Sig: Take 10 mg by mouth nightly. cyanocobalamin (VITAMIN B12) 1,000 mcg/mL injection 11/15/2019  No Yes   Sig: INJECT CONTENTS OF ONE VIAL INTRAMUSCULARLY EVERY OTHER WEEK   empagliflozin (JARDIANCE) 25 mg tablet 12/3/2019 at Unknown time  No Yes   Sig: Take 1 Tab by mouth daily. metFORMIN ER (GLUCOPHAGE XR) 500 mg tablet 12/3/2019 at Unknown time  No Yes   Sig: Take 1 Tab by mouth daily. omeprazole (PRILOSEC) 40 mg capsule 12/3/2019 at Unknown time  No Yes   Sig: Take 1 Cap by mouth daily. ondansetron (ZOFRAN ODT) 4 mg disintegrating tablet   No No   Sig: Take 1 Tab by mouth every eight (8) hours as needed for Nausea. ramipril (ALTACE) 10 mg capsule 12/3/2019 at Unknown time  No Yes   Sig: Take 1 Cap by mouth nightly. sildenafil citrate (VIAGRA) 50 mg tablet   No No   Sig: Take 1 Tab by mouth as needed (ED). sucralfate (CARAFATE) 1 gram tablet 12/3/2019 at Unknown time  Yes Yes   Sig: Take 1 g by mouth four (4) times daily.    tamsulosin (FLOMAX) 0.4 mg capsule 12/3/2019 at Unknown time  Yes Yes   Sig: TAKE ONE CAPSULE BY MOUTH EVERY EVENING   traMADol (ULTRAM) 50 mg tablet   Yes Yes   Sig: Take 50 mg by mouth every six (6) hours as needed for Pain.       Facility-Administered Medications: None       Hospital Medications:  Current Facility-Administered Medications   Medication Dose Route Frequency    sodium chloride (NS) flush 5-40 mL  5-40 mL IntraVENous Q8H    sodium chloride (NS) flush 5-40 mL  5-40 mL IntraVENous PRN    0.9% sodium chloride infusion  125 mL/hr IntraVENous CONTINUOUS    HYDROmorphone (PF) (DILAUDID) injection 1 mg  1 mg IntraVENous Q4H PRN    heparin (porcine) injection 5,000 Units  5,000 Units SubCUTAneous Q8H    insulin lispro (HUMALOG) injection   SubCUTAneous Q6H    glucose chewable tablet 16 g  4 Tab Oral PRN    glucagon (GLUCAGEN) injection 1 mg  1 mg IntraMUSCular PRN    dextrose 10% infusion 0-250 mL  0-250 mL IntraVENous PRN       Social History:  Social History     Tobacco Use    Smoking status: Never Smoker    Smokeless tobacco: Never Used   Substance Use Topics    Alcohol use: Yes     Comment: very rare       Family History:  Family History   Problem Relation Age of Onset    Cancer Father         Breast and Colon    Cancer Mother         LEUKEMIA    No Known Problems Sister     No Known Problems Brother     No Known Problems Sister     Anesth Problems Neg Hx        Review of Systems:    Constitutional: negative fever, negative chills, negative weight loss  Eyes:   negative visual changes  ENT:   negative sore throat, tongue or lip swelling  Respiratory:  negative cough, negative dyspnea  Cards:  negative for chest pain, palpitations, lower extremity edema  GI:   See HPI  :  negative for frequency, dysuria  Integument:  negative for rash and pruritus  Heme:  negative for easy bruising and gum/nose bleeding  Musculoskel: negative for myalgias, back pain and muscle weakness  Neuro: negative for headaches, dizziness, vertigo  Psych:  negative for feelings of anxiety, depression      Objective:     Patient Vitals for the past 8 hrs:   BP Temp Pulse Resp SpO2 Height Weight   12/04/19 1413 118/75 97.5 °F (36.4 °C) 76 16 94 %     12/04/19 1314 106/67 97.3 °F (36.3 °C) 71 16 95 %     12/04/19 1200 112/65    95 %     12/04/19 1014 106/62 98 °F (36.7 °C) 84 16 98 %     12/04/19 0813 95/53 97.5 °F (36.4 °C) 79 20 97 % 5' 8\" (1.727 m) 77.1 kg (170 lb)     12/04 0701 - 12/04 1900  In: 2100 [I.V.:2100]  Out: -   No intake/output data recorded. PHYSICAL EXAM:  General: WD, WN. Alert, cooperative, no acute distress.    HEENT: NC, Atraumatic. PERRLA, EOMI. Anicteric sclerae. Lungs:  CTA Bilaterally. No Wheezing/Rhonchi/Rales. Heart:  Regular rate and rhythm, No murmur, No Rubs, No Gallops  Abdomen: Soft, non-distended, moderate epigastric tenderness.  +Bowel sounds, no HSM  Extremities: No c/c/e  Neurologic:  Alert and oriented X 3. No acute neurological distress. Psych:   Good insight. Not anxious nor agitated. Data Review     Recent Labs     12/04/19  0821   WBC 14.7*   HGB 15.7   HCT 49.2   *     Recent Labs     12/04/19  0821      K 3.5      CO2 25   BUN 18   CREA 0.97   *   CA 9.1     Recent Labs     12/04/19  0821   SGOT 13*   *   TP 6.8   ALB 3.9   GLOB 2.9   LPSE >3,000*     Recent Labs     12/04/19  0821   INR 1.0   PTP 10.6        Imaging studies reviewed      Assessment:   1. Recurrent acute pancreatitis - 3rd occurrence since whipple procedure, CT w/ mild PD dilation, concern for possible stricture. Previous triglyceride and IgG4 normal.   2. Hx cholangiocarcinoma - s/p pylorus-preserving whipple   3.  DM     Patient Active Problem List   Diagnosis Code    Obstructive jaundice K83.1    Common bile duct (CBD) obstruction K83.1    UTI (urinary tract infection) N39.0    Cholangiocarcinoma of biliary tract (HCC) C24.9    Cholangiocarcinoma determined by biopsy of biliary tract (HCC) C24.9    Paroxysmal atrial fibrillation (HCC) I48.0    S/P ablation of atrial fibrillation Z98.890, Z86.79    Essential hypertension I10    Cramps, muscle, general R25.2    Low vitamin D level R79.89    Low vitamin B12 level E53.8    Vomiting R11.10    Controlled type 2 diabetes mellitus without complication, without long-term current use of insulin (HCC) E11.9    Acute pancreatitis K85.90    Leukocytosis D72.829    Pancreatitis K85.90              Plan:   -NPO   -Supportive management per primary team  -Trend labs  -Further plans per Dr. Kerrie Lizama  -Will follow   -Thank you kindly for allowing us to participate in the care of this patient

## 2019-12-04 NOTE — H&P
HISTORY AND PHYSICAL      PCP: Obinna Wesley DO  History source: Patient       CC: Abdominal pain      HPI: A 62year old female patient with PMH of cholangiocarcinoma s/p pylorus-preserving Whipple 10/2017, HTN, DM, BPH and recurrent pancreatitis presented to ED for evaluation of abdominal pain since this early morning. Patient was recently discharged on 11/20/2019 for similar complaints. Patient had episodes of abdominal pain with nausea and vomiting since discharge. He was seen by his PCP on 12/2/2019. This morning he woke up with abdominal pain at 4 am, sever in nature, center of abdomen, feels like stabbing, radiates to back. Similar to his pain in the past. He was nauseous and had one vomiting. Denied fever or chills, alcohol intake , new medications, chest pain, sob, urinary or bowel changes. In ED, lipase was elevated. CT abdomen ordered. Hospitalist consulted for admission       PMH/PSH:  Past Medical History:   Diagnosis Date    Arrhythmia     Previous a.fib, ablation, NSR now; NO LONGER FOLLOWED BY CARDIOLOGIST.     Autoimmune disease ([address]     SJOGREN'S    Cancer ([address]     COMMON BILE DUCT, ADENOCARCINOMA    Diabetes ([address]     Family history of skin cancer     Hypertension     Sepsis ([address] 2017    STENTING TO BILE DUCT    Status post chemotherapy     Stroke [facility]) 2008    brain aneurysm - no deficits    Sun-damaged skin     Sunburn, blistering      Past Surgical History:   Procedure Laterality Date    ABDOMEN SURGERY PROC UNLISTED  10/2017    Whipple     HX GI      COLONOSCOPY, POLYPS (BENIGN)    HX GI  10/06/2017    Viktor Padgett [facility] [place]     [address]  2005    Ablation     HX ORTHOPAEDIC  2000    Spinal fusion W/ HARDWARE    HX OTHER SURGICAL  11/07/2017    [place] Cath, Dr. Silviano Andrade  2017    PORTACATH - then removed    NEUROLOGICAL PROCEDURE UNLISTED  2005    Dilcia Jones hole washout from cerebral hemorrhage       Home meds:   Prior to Admission medications    Medication Sig Start Date End Date Taking? Authorizing Provider   metFORMIN ER (GLUCOPHAGE XR) 500 mg tablet Take 1 Tab by mouth daily. 11/20/19   Palmira Hughes MD   sucralfate (CARAFATE) 1 gram tablet Take 1 g by mouth four (4) times daily. Provider, Historical   ondansetron (ZOFRAN ODT) 4 mg disintegrating tablet Take 1 Tab by mouth every eight (8) hours as needed for Nausea. 11/7/19   Enedina Morgan MD   omeprazole (PRILOSEC) 40 mg capsule Take 1 Cap by mouth daily. 11/5/19   William Jensen MD   Blood-Glucose Meter monitoring kit 1 preferred brand glucometer for checking home glucose 5/30/19   Brit Cochran MD   lancets misc Use preferred brand; Check glucose 1 time daily, Diagnosis E11.65 5/30/19   Brit Cochran MD   glucose blood VI test strips (PHARMACIST CHOICE) strip Use preferred brand; Check glucose 1 times daily, Diagnosis E11.65 5/30/19   Brit Cochran MD   empagliflozin (JARDIANCE) 25 mg tablet Take 1 Tab by mouth daily. 5/30/19   Brit Cochran MD   gabapentin (NEURONTIN) 300 mg capsule TAKE 3 CAPS BY MOUTH TWO (2) TIMES A DAY. 5/15/19   Bob OGDEN III, DO   aspirin delayed-release 81 mg tablet Take 81 mg by mouth daily. Provider, Historical   sildenafil citrate (VIAGRA) 50 mg tablet Take 1 Tab by mouth as needed (ED). 5/6/19   Bob OGDEN III, DO   tamsulosin (FLOMAX) 0.4 mg capsule TAKE ONE CAPSULE BY MOUTH EVERY EVENING 2/28/19   Provider, Historical   ramipril (ALTACE) 10 mg capsule Take 1 Cap by mouth nightly. 1/4/19   Margarita Raya III, DO   cyanocobalamin (VITAMIN B12) 1,000 mcg/mL injection INJECT CONTENTS OF ONE VIAL INTRAMUSCULARLY EVERY OTHER WEEK 9/20/18   Margarita Raya III, DO   alpha-D-galactosidase (BEANO PO) Take 1 Tab by mouth two (2) times a day. Other, MD Flynn   cetirizine (ZYRTEC) 10 mg tablet Take 10 mg by mouth nightly. Provider, Historical       Allergies:   Allergies   Allergen Reactions    Shellfish Derived Anaphylaxis     Soft shell crabs    Pcn [Penicillins] Other (comments)     Pt stated \"always been told PCN\"       FH:  Family History   Problem Relation Age of Onset    Cancer Father         Breast and Colon    Cancer Mother         LEUKEMIA    No Known Problems Sister     No Known Problems Brother     No Known Problems Sister     Anesth Problems Neg Hx        SH:  Social History     Tobacco Use    Smoking status: Never Smoker    Smokeless tobacco: Never Used   Substance Use Topics    Alcohol use: Yes     Comment: very rare       ROS: A comprehensive review of systems was negative except for that written in the HPI.       PHYSICAL EXAM:  Visit Vitals  BP 95/53 (BP 1 Location: Left arm, BP Patient Position: At rest)   Pulse 79   Temp 97.5 °F (36.4 °C)   Resp 20   Ht 5' 8\" (1.727 m)   Wt 77.1 kg (170 lb)   SpO2 97%   BMI 25.85 kg/m²       Gen: mild distress due to pain  HEENT: anicteric sclerae, normal conjunctiva, oropharynx clear, MM moist  Neck: supple, trachea midline, no adenopathy  Heart: RRR, no MRG, no JVD, no peripheral edema  Lungs: CTA b/l, non-labored respirations  Abd: soft, tender in epigastric region  Extr: warm  Skin: dry, no rash  Neuro: CN II-XII grossly intact, normal speech, moves all extremities  Psych: normal mood, appropriate affect        Labs/Imaging:  Recent Results (from the past 24 hour(s))   CBC WITH AUTOMATED DIFF    Collection Time: 12/04/19  8:21 AM   Result Value Ref Range    WBC 14.7 (H) 4.1 - 11.1 K/uL    RBC 5.36 4.10 - 5.70 M/uL    HGB 15.7 12.1 - 17.0 g/dL    HCT 49.2 36.6 - 50.3 %    MCV 91.8 80.0 - 99.0 FL    MCH 29.3 26.0 - 34.0 PG    MCHC 31.9 30.0 - 36.5 g/dL    RDW 14.3 11.5 - 14.5 %    PLATELET 658 (L) 477 - 400 K/uL    MPV 10.9 8.9 - 12.9 FL    NRBC 0.0 0  WBC    ABSOLUTE NRBC 0.00 0.00 - 0.01 K/uL    NEUTROPHILS 87 (H) 32 - 75 %    LYMPHOCYTES 4 (L) 12 - 49 %    MONOCYTES 7 5 - 13 %    EOSINOPHILS 1 0 - 7 %    BASOPHILS 0 0 - 1 %    IMMATURE GRANULOCYTES 1 (H) 0.0 - 0.5 %    ABS. NEUTROPHILS 12.9 (H) 1.8 - 8.0 K/UL    ABS. LYMPHOCYTES 0.6 (L) 0.8 - 3.5 K/UL    ABS. MONOCYTES 1.0 0.0 - 1.0 K/UL    ABS. EOSINOPHILS 0.1 0.0 - 0.4 K/UL    ABS. BASOPHILS 0.0 0.0 - 0.1 K/UL    ABS. IMM. GRANS. 0.1 (H) 0.00 - 0.04 K/UL    DF SMEAR SCANNED      RBC COMMENTS NORMOCYTIC, NORMOCHROMIC     METABOLIC PANEL, COMPREHENSIVE    Collection Time: 12/04/19  8:21 AM   Result Value Ref Range    Sodium 141 136 - 145 mmol/L    Potassium 3.5 3.5 - 5.1 mmol/L    Chloride 107 97 - 108 mmol/L    CO2 25 21 - 32 mmol/L    Anion gap 9 5 - 15 mmol/L    Glucose 189 (H) 65 - 100 mg/dL    BUN 18 6 - 20 MG/DL    Creatinine 0.97 0.70 - 1.30 MG/DL    BUN/Creatinine ratio 19 12 - 20      GFR est AA >60 >60 ml/min/1.73m2    GFR est non-AA >60 >60 ml/min/1.73m2    Calcium 9.1 8.5 - 10.1 MG/DL    Bilirubin, total 1.0 0.2 - 1.0 MG/DL    ALT (SGPT) 28 12 - 78 U/L    AST (SGOT) 13 (L) 15 - 37 U/L    Alk. phosphatase 148 (H) 45 - 117 U/L    Protein, total 6.8 6.4 - 8.2 g/dL    Albumin 3.9 3.5 - 5.0 g/dL    Globulin 2.9 2.0 - 4.0 g/dL    A-G Ratio 1.3 1.1 - 2.2     SAMPLES BEING HELD    Collection Time: 12/04/19  8:21 AM   Result Value Ref Range    SAMPLES BEING HELD 1 RED, 1 LORENE     COMMENT        Add-on orders for these samples will be processed based on acceptable specimen integrity and analyte stability, which may vary by analyte.    LIPASE    Collection Time: 12/04/19  8:21 AM   Result Value Ref Range    Lipase >3,000 (H) 73 - 393 U/L   PROTHROMBIN TIME + INR    Collection Time: 12/04/19  8:21 AM   Result Value Ref Range    INR 1.0 0.9 - 1.1      Prothrombin time 10.6 9.0 - 11.1 sec   URINALYSIS W/MICROSCOPIC    Collection Time: 12/04/19  8:32 AM   Result Value Ref Range    Color YELLOW/STRAW      Appearance CLEAR CLEAR      Specific gravity 1.010 1.003 - 1.030      pH (UA) 5.5 5.0 - 8.0      Protein NEGATIVE  NEG mg/dL    Glucose >1,000 (A) NEG mg/dL    Ketone NEGATIVE  NEG mg/dL    Bilirubin NEGATIVE  NEG      Blood NEGATIVE  NEG      Urobilinogen 1.0 0.2 - 1.0 EU/dL    Nitrites NEGATIVE  NEG      Leukocyte Esterase NEGATIVE  NEG      WBC 0-4 0 - 4 /hpf    RBC 0-5 0 - 5 /hpf    Epithelial cells FEW FEW /lpf    Bacteria NEGATIVE  NEG /hpf    Hyaline cast 0-2 0 - 5 /lpf   URINE CULTURE HOLD SAMPLE    Collection Time: 12/04/19  8:32 AM   Result Value Ref Range    Urine culture hold        URINE ON HOLD IN MICROBIOLOGY DEPT FOR 3 DAYS. IF UNPRESERVED URINE IS SUBMITTED, IT CANNOT BE USED FOR ADDITIONAL TESTING AFTER 24 HRS, RECOLLECTION WILL BE REQUIRED. Recent Labs     12/04/19  0821   WBC 14.7*   HGB 15.7   HCT 49.2   *     Recent Labs     12/04/19  0821      K 3.5      CO2 25   BUN 18   CREA 0.97   *   CA 9.1     Recent Labs     12/04/19  0821   SGOT 13*   ALT 28   *   TBILI 1.0   TP 6.8   ALB 3.9   GLOB 2.9   LPSE >3,000*       No results for input(s): CPK, CKNDX, TROIQ in the last 72 hours. No lab exists for component: CPKMB    Recent Labs     12/04/19 0821   INR 1.0   PTP 10.6        No results for input(s): PH, PCO2, PO2 in the last 72 hours. All labs and imaging personally reviewed by me. Assessment & Plan:   Acute recurrent pancreatitis  - admit to medical  - lipase >3000  - Leucocytosis - 14.7  - Ct abd: Acute, uncomplicated pancreatitis; slightly less severe than 11/17/2019  - GI on board  - NPO, IVF, pain medications  - concern for pancreatic stricture due to recurrent nature. Further plans per GI     Hx cholangiocarcinoma - s/p pylorus-preserving whipple      Diabetes mellitus type 2-  ISS   HTN- bp stable. BPH    Baseline mobility - independent   DVT ppx: Heparin   Code status: Full  Disposition: TBD.  Home when ready     Signed By: Haydee Gardiner MD     December 4, 2019

## 2019-12-05 LAB
GLUCOSE BLD STRIP.AUTO-MCNC: 103 MG/DL (ref 65–100)
GLUCOSE BLD STRIP.AUTO-MCNC: 105 MG/DL (ref 65–100)
GLUCOSE BLD STRIP.AUTO-MCNC: 69 MG/DL (ref 65–100)
GLUCOSE BLD STRIP.AUTO-MCNC: 76 MG/DL (ref 65–100)
GLUCOSE BLD STRIP.AUTO-MCNC: 78 MG/DL (ref 65–100)
GLUCOSE BLD STRIP.AUTO-MCNC: 92 MG/DL (ref 65–100)
SERVICE CMNT-IMP: ABNORMAL
SERVICE CMNT-IMP: ABNORMAL
SERVICE CMNT-IMP: NORMAL

## 2019-12-05 PROCEDURE — 74011250636 HC RX REV CODE- 250/636: Performed by: INTERNAL MEDICINE

## 2019-12-05 PROCEDURE — 74011250637 HC RX REV CODE- 250/637: Performed by: INTERNAL MEDICINE

## 2019-12-05 PROCEDURE — 82962 GLUCOSE BLOOD TEST: CPT

## 2019-12-05 PROCEDURE — 74011250636 HC RX REV CODE- 250/636: Performed by: NURSE PRACTITIONER

## 2019-12-05 PROCEDURE — 65270000032 HC RM SEMIPRIVATE

## 2019-12-05 PROCEDURE — 74011000250 HC RX REV CODE- 250: Performed by: INTERNAL MEDICINE

## 2019-12-05 RX ADMIN — ONDANSETRON 4 MG: 2 INJECTION INTRAMUSCULAR; INTRAVENOUS at 07:53

## 2019-12-05 RX ADMIN — ONDANSETRON 4 MG: 2 INJECTION INTRAMUSCULAR; INTRAVENOUS at 00:09

## 2019-12-05 RX ADMIN — HYDROMORPHONE HYDROCHLORIDE 1 MG: 1 INJECTION, SOLUTION INTRAMUSCULAR; INTRAVENOUS; SUBCUTANEOUS at 12:28

## 2019-12-05 RX ADMIN — SODIUM CHLORIDE 125 ML/HR: 900 INJECTION, SOLUTION INTRAVENOUS at 09:14

## 2019-12-05 RX ADMIN — ONDANSETRON 4 MG: 2 INJECTION INTRAMUSCULAR; INTRAVENOUS at 17:41

## 2019-12-05 RX ADMIN — HEPARIN SODIUM 5000 UNITS: 5000 INJECTION INTRAVENOUS; SUBCUTANEOUS at 09:19

## 2019-12-05 RX ADMIN — HEPARIN SODIUM 5000 UNITS: 5000 INJECTION INTRAVENOUS; SUBCUTANEOUS at 02:00

## 2019-12-05 RX ADMIN — HYDROMORPHONE HYDROCHLORIDE 1 MG: 1 INJECTION, SOLUTION INTRAMUSCULAR; INTRAVENOUS; SUBCUTANEOUS at 00:09

## 2019-12-05 RX ADMIN — Medication 10 ML: at 22:22

## 2019-12-05 RX ADMIN — ONDANSETRON 4 MG: 2 INJECTION INTRAMUSCULAR; INTRAVENOUS at 12:28

## 2019-12-05 RX ADMIN — HYDROMORPHONE HYDROCHLORIDE 1 MG: 1 INJECTION, SOLUTION INTRAMUSCULAR; INTRAVENOUS; SUBCUTANEOUS at 17:42

## 2019-12-05 RX ADMIN — SODIUM CHLORIDE 125 ML/HR: 900 INJECTION, SOLUTION INTRAVENOUS at 01:32

## 2019-12-05 RX ADMIN — Medication 16 G: at 12:52

## 2019-12-05 RX ADMIN — FAMOTIDINE 20 MG: 10 INJECTION, SOLUTION INTRAVENOUS at 09:19

## 2019-12-05 RX ADMIN — Medication 16 G: at 06:27

## 2019-12-05 RX ADMIN — HYDROMORPHONE HYDROCHLORIDE 1 MG: 1 INJECTION, SOLUTION INTRAMUSCULAR; INTRAVENOUS; SUBCUTANEOUS at 07:53

## 2019-12-05 RX ADMIN — SODIUM CHLORIDE 125 ML/HR: 900 INJECTION, SOLUTION INTRAVENOUS at 17:41

## 2019-12-05 RX ADMIN — FAMOTIDINE 20 MG: 10 INJECTION, SOLUTION INTRAVENOUS at 22:13

## 2019-12-05 NOTE — PROGRESS NOTES
Hospitalist Progress Note      Hospital summary: A 62year old female patient with PMH of cholangiocarcinoma s/p pylorus-preserving Whipple 10/2017, HTN, DM, BPH and recurrent pancreatitis presented to ED for evaluation of abdominal pain since this early morning. Patient was recently discharged on 11/20/2019 for similar complaints. Patient had episodes of abdominal pain with nausea and vomiting since discharge. He was seen by his PCP on 12/2/2019. This morning he woke up with abdominal pain at 4 am, sever in nature, center of abdomen, feels like stabbing, radiates to back. Similar to his pain in the past. He was nauseous and had one vomiting. Denied fever or chills, alcohol intake , new medications, chest pain, sob, urinary or bowel changes. In ED, lipase was elevated. CT abdomen ordered. Hospitalist consulted for admission  12/4/2019      Assessment/Plan:  Acute recurrent pancreatitis - not improving   - lipase >3000, Leucocytosis - 14.7 on poa  - Ct abd: Acute, uncomplicated pancreatitis; slightly less severe than 11/17/2019  - GI on board  - NPO, IVF, pain medications  - concern for pancreatic stricture due to recurrent nature. Further plans per GI      Hx cholangiocarcinoma - s/p pylorus-preserving whipple      Diabetes mellitus type 2-  ISS   HTN- bp stable. BPH    All oral meds on hold      Baseline mobility - independent   DVT ppx: Heparin   Code status: Full  Disposition: TBD. Home when ready   ----------------------------------------------    CC: Abdominal pain    S: Patient is seen and examined at bedside. Wife and sister present. Still has abdominal pain , 1 episode of vomiting today. Wife is worried, reassured her. Wants to discuss with GI      Review of Systems:  A comprehensive review of systems was negative.     O:  Visit Vitals  /69 (BP 1 Location: Right arm, BP Patient Position: At rest)   Pulse 66   Temp 97.3 °F (36.3 °C)   Resp 18   Ht 5' 8\" (1.727 m)   Wt 77.1 kg (170 lb)   SpO2 95%   BMI 25.85 kg/m²       PHYSICAL EXAM:  Gen: mild distress  HEENT: anicteric sclerae, normal conjunctiva, oropharynx clear, MM moist  Neck: supple, trachea midline, no adenopathy  Heart: RRR, no MRG, no JVD, no peripheral edema  Lungs: CTA b/l, non-labored respirations  Abd: soft, tenderness in epigastric region  Extr: warm  Skin: dry, no rash  Neuro: CN II-XII grossly intact, normal speech, moves all extremities  Psych: normal mood, appropriate affect      Intake/Output Summary (Last 24 hours) at 12/5/2019 1450  Last data filed at 12/5/2019 2293  Gross per 24 hour   Intake 2801 ml   Output    Net 2801 ml        Recent labs & imaging reviewed:  Recent Results (from the past 24 hour(s))   GLUCOSE, POC    Collection Time: 12/04/19  5:51 PM   Result Value Ref Range    Glucose (POC) 122 (H) 65 - 100 mg/dL    Performed by Félix Wilson ([facility])    GLUCOSE, POC    Collection Time: 12/04/19 11:05 PM   Result Value Ref Range    Glucose (POC) 95 65 - 100 mg/dL    Performed by [facility], POC    Collection Time: 12/05/19  6:14 AM   Result Value Ref Range    Glucose (POC) 76 65 - 100 mg/dL    Performed by [facility] [email or web address] , POC    Collection Time: 12/05/19  7:02 AM   Result Value Ref Range    Glucose (POC) 103 (H) 65 - 100 mg/dL    Performed by [address] [place], [place] [address] , POC    Collection Time: 12/05/19 12:24 PM   Result Value Ref Range    Glucose (POC) 78 65 - 100 mg/dL    [facility]formed by [facility], POC    Collection Time: 12/05/19 12:47 PM   Result Value Ref Range    Glucose (POC) 69 65 - 100 mg/dL    Performed by [facility], POC    Collection Time: 12/05/19  1:08 PM   Result Value Ref Range    Glucose (POC) 105 (H) 65 - 100 mg/dL    Performed by Kelton Nelson      Recent Labs     12/04/19  0821   WBC 14.7*   HGB 15.7   HCT 49.2   *     Recent Labs     12/04/19 0821      K 3.5      CO2 25   BUN 18   CREA 0.97   * CA 9.1     Recent Labs     12/04/19  0821   SGOT 13*   ALT 28   *   TBILI 1.0   TP 6.8   ALB 3.9   GLOB 2.9   LPSE >3,000*     Recent Labs     12/04/19  0821   INR 1.0   PTP 10.6      No results for input(s): FE, TIBC, PSAT, FERR in the last 72 hours. No results found for: FOL, RBCF   No results for input(s): PH, PCO2, PO2 in the last 72 hours. No results for input(s): CPK, CKNDX, TROIQ in the last 72 hours.     No lab exists for component: CPKMB  Lab Results   Component Value Date/Time    Cholesterol, total 76 (L) 10/30/2018 09:39 AM    HDL Cholesterol 25 (L) 10/30/2018 09:39 AM    LDL, calculated 41 10/30/2018 09:39 AM    Triglyceride 48 11/19/2019 02:45 AM    CHOL/HDL Ratio 4.1 08/25/2017 12:13 AM     Lab Results   Component Value Date/Time    Glucose (POC) 105 (H) 12/05/2019 01:08 PM    Glucose (POC) 69 12/05/2019 12:47 PM    Glucose (POC) 78 12/05/2019 12:24 PM    Glucose (POC) 103 (H) 12/05/2019 07:02 AM    Glucose (POC) 76 12/05/2019 06:14 AM     Lab Results   Component Value Date/Time    Color YELLOW/STRAW 12/04/2019 08:32 AM    Appearance CLEAR 12/04/2019 08:32 AM    Specific gravity 1.010 12/04/2019 08:32 AM    Specific gravity 1.027 08/16/2018 08:56 AM    pH (UA) 5.5 12/04/2019 08:32 AM    Protein NEGATIVE  12/04/2019 08:32 AM    Glucose >1,000 (A) 12/04/2019 08:32 AM    Ketone NEGATIVE  12/04/2019 08:32 AM    Bilirubin NEGATIVE  12/04/2019 08:32 AM    Urobilinogen 1.0 12/04/2019 08:32 AM    Nitrites NEGATIVE  12/04/2019 08:32 AM    Leukocyte Esterase NEGATIVE  12/04/2019 08:32 AM    Epithelial cells FEW 12/04/2019 08:32 AM    Bacteria NEGATIVE  12/04/2019 08:32 AM    WBC 0-4 12/04/2019 08:32 AM    RBC 0-5 12/04/2019 08:32 AM       Med list reviewed  Current Facility-Administered Medications   Medication Dose Route Frequency    sodium chloride (NS) flush 5-40 mL  5-40 mL IntraVENous Q8H    sodium chloride (NS) flush 5-40 mL  5-40 mL IntraVENous PRN    0.9% sodium chloride infusion  125 mL/hr IntraVENous CONTINUOUS    HYDROmorphone (PF) (DILAUDID) injection 1 mg  1 mg IntraVENous Q4H PRN    heparin (porcine) injection 5,000 Units  5,000 Units SubCUTAneous Q8H    insulin lispro (HUMALOG) injection   SubCUTAneous Q6H    glucose chewable tablet 16 g  4 Tab Oral PRN    glucagon (GLUCAGEN) injection 1 mg  1 mg IntraMUSCular PRN    dextrose 10% infusion 0-250 mL  0-250 mL IntraVENous PRN    ondansetron (ZOFRAN) injection 4 mg  4 mg IntraVENous Q4H PRN    famotidine (PF) (PEPCID) 20 mg in 0.9% sodium chloride 10 mL injection  20 mg IntraVENous Q12H       Care Plan discussed with:  Patient/Family    Jasmin Santos MD  Internal Medicine  Date of Service: 12/5/2019

## 2019-12-05 NOTE — DIABETES MGMT
[facility]    [facility] Progress Note    Recommendations/ Comments: chart review due to hypoglycemia. Note pt is NPO due to pancreatitis. If appropriate, please consider: low rate D5W while NPO for protein sparing and BG maintenance above 80 mg/dl. Current hospital DM medication: lispro correction scale    Chart reviewed on [facility]. Patient is a 62 y.o. male with known hx of DM on Jardiance, 25 mg daily and Metformin, 500 mg once daily at home. A1c:   Lab Results   Component Value Date/Time    Hemoglobin A1c 6.6 (H) 10/30/2018 09:39 AM    Hemoglobin A1c 6.5 (H) 06/07/2018 11:31 AM       Recent Glucose Results:   Lab Results   Component Value Date/Time    GLUCPOC 105 (H) 12/05/2019 01:08 PM    GLUCPOC 69 12/05/2019 12:47 PM    GLUCPOC 78 12/05/2019 12:24 PM        Lab Results   Component Value Date/Time    Creatinine 0.97 12/04/2019 08:21 AM     Estimated Creatinine Clearance: 75.4 mL/min (based on SCr of 0.97 mg/dL). Active Orders   Diet    DIET NPO With Sips of Clear Fluids, Except Meds, With Ice Chips        PO intake:   Patient Vitals for the past 72 hrs:   % Diet Eaten   12/04/19 1311 0 %       Will continue to follow as needed.     Thank you  Sanchez Hunter RD, Diabetes Clinician        Time spent: 4 minutes

## 2019-12-05 NOTE — PROGRESS NOTES
Problem: Falls - Risk of  Goal: *Absence of Falls  Description  Document Rebbecca Persons Fall Risk and appropriate interventions in the flowsheet.   Outcome: Progressing Towards Goal  Note: Fall Risk Interventions:                                Problem: Patient Education: Go to Patient Education Activity  Goal: Patient/Family Education  Outcome: Progressing Towards Goal

## 2019-12-06 VITALS
HEIGHT: 68 IN | WEIGHT: 170 LBS | HEART RATE: 89 BPM | OXYGEN SATURATION: 96 % | TEMPERATURE: 98.1 F | DIASTOLIC BLOOD PRESSURE: 83 MMHG | RESPIRATION RATE: 19 BRPM | SYSTOLIC BLOOD PRESSURE: 125 MMHG | BODY MASS INDEX: 25.76 KG/M2

## 2019-12-06 LAB
ANION GAP SERPL CALC-SCNC: 9 MMOL/L (ref 5–15)
BASOPHILS # BLD: 0.1 K/UL (ref 0–0.1)
BASOPHILS NFR BLD: 1 % (ref 0–1)
BUN SERPL-MCNC: 9 MG/DL (ref 6–20)
BUN/CREAT SERPL: 14 (ref 12–20)
CALCIUM SERPL-MCNC: 9.2 MG/DL (ref 8.5–10.1)
CHLORIDE SERPL-SCNC: 106 MMOL/L (ref 97–108)
CO2 SERPL-SCNC: 23 MMOL/L (ref 21–32)
CREAT SERPL-MCNC: 0.66 MG/DL (ref 0.7–1.3)
DIFFERENTIAL METHOD BLD: ABNORMAL
EOSINOPHIL # BLD: 0.3 K/UL (ref 0–0.4)
EOSINOPHIL NFR BLD: 5 % (ref 0–7)
ERYTHROCYTE [DISTWIDTH] IN BLOOD BY AUTOMATED COUNT: 13.9 % (ref 11.5–14.5)
GLUCOSE BLD STRIP.AUTO-MCNC: 101 MG/DL (ref 65–100)
GLUCOSE BLD STRIP.AUTO-MCNC: 108 MG/DL (ref 65–100)
GLUCOSE BLD STRIP.AUTO-MCNC: 148 MG/DL (ref 65–100)
GLUCOSE BLD STRIP.AUTO-MCNC: 215 MG/DL (ref 65–100)
GLUCOSE SERPL-MCNC: 121 MG/DL (ref 65–100)
HCT VFR BLD AUTO: 44.5 % (ref 36.6–50.3)
HGB BLD-MCNC: 14.8 G/DL (ref 12.1–17)
IMM GRANULOCYTES # BLD AUTO: 0.1 K/UL (ref 0–0.04)
IMM GRANULOCYTES NFR BLD AUTO: 1 % (ref 0–0.5)
LIPASE SERPL-CCNC: 630 U/L (ref 73–393)
LYMPHOCYTES # BLD: 0.8 K/UL (ref 0.8–3.5)
LYMPHOCYTES NFR BLD: 12 % (ref 12–49)
MCH RBC QN AUTO: 29.5 PG (ref 26–34)
MCHC RBC AUTO-ENTMCNC: 33.3 G/DL (ref 30–36.5)
MCV RBC AUTO: 88.8 FL (ref 80–99)
MONOCYTES # BLD: 0.5 K/UL (ref 0–1)
MONOCYTES NFR BLD: 8 % (ref 5–13)
NEUTS SEG # BLD: 4.6 K/UL (ref 1.8–8)
NEUTS SEG NFR BLD: 74 % (ref 32–75)
NRBC # BLD: 0 K/UL (ref 0–0.01)
NRBC BLD-RTO: 0 PER 100 WBC
PLATELET # BLD AUTO: 142 K/UL (ref 150–400)
PMV BLD AUTO: 10.7 FL (ref 8.9–12.9)
POTASSIUM SERPL-SCNC: 3.4 MMOL/L (ref 3.5–5.1)
RBC # BLD AUTO: 5.01 M/UL (ref 4.1–5.7)
SERVICE CMNT-IMP: ABNORMAL
SODIUM SERPL-SCNC: 138 MMOL/L (ref 136–145)
WBC # BLD AUTO: 6.3 K/UL (ref 4.1–11.1)

## 2019-12-06 PROCEDURE — 85025 COMPLETE CBC W/AUTO DIFF WBC: CPT

## 2019-12-06 PROCEDURE — 36415 COLL VENOUS BLD VENIPUNCTURE: CPT

## 2019-12-06 PROCEDURE — 74011000250 HC RX REV CODE- 250: Performed by: INTERNAL MEDICINE

## 2019-12-06 PROCEDURE — 83690 ASSAY OF LIPASE: CPT

## 2019-12-06 PROCEDURE — 80048 BASIC METABOLIC PNL TOTAL CA: CPT

## 2019-12-06 PROCEDURE — 82962 GLUCOSE BLOOD TEST: CPT

## 2019-12-06 PROCEDURE — 74011636637 HC RX REV CODE- 636/637: Performed by: INTERNAL MEDICINE

## 2019-12-06 PROCEDURE — 74011250636 HC RX REV CODE- 250/636: Performed by: INTERNAL MEDICINE

## 2019-12-06 RX ORDER — POTASSIUM CHLORIDE 7.45 MG/ML
10 INJECTION INTRAVENOUS
Status: DISPENSED | OUTPATIENT
Start: 2019-12-06 | End: 2019-12-06

## 2019-12-06 RX ADMIN — INSULIN LISPRO 3 UNITS: 100 INJECTION, SOLUTION INTRAVENOUS; SUBCUTANEOUS at 12:56

## 2019-12-06 RX ADMIN — POTASSIUM CHLORIDE 10 MEQ: 7.46 INJECTION, SOLUTION INTRAVENOUS at 08:30

## 2019-12-06 RX ADMIN — SODIUM CHLORIDE 125 ML/HR: 900 INJECTION, SOLUTION INTRAVENOUS at 08:29

## 2019-12-06 RX ADMIN — Medication 10 ML: at 13:00

## 2019-12-06 RX ADMIN — FAMOTIDINE 20 MG: 10 INJECTION, SOLUTION INTRAVENOUS at 10:52

## 2019-12-06 NOTE — DISCHARGE SUMMARY
Discharge Summary     Patient:  Tim Babin       MRN: [id number]       YOB: 1955       Age: 62 y.o. Date of admission:  12/4/2019    Date of discharge:  12/6/2019    Primary care provider: Dr. Robert Pena DO    Admitting provider:  Jesús Zavala MD    Discharging provider:  Zeyad Britton [address] [phone]. If unavailable, call [phone] and ask the  to page the triage hospitalist.    Consultations  · IP CONSULT TO GASTROENTEROLOGY    Procedures  · * No surgery found *    Discharge destination: home . The patient is stable for discharge. Admission diagnosis  · Acute pancreatitis [K85.90]    Current Discharge Medication List      CONTINUE these medications which have NOT CHANGED    Details   traMADol (ULTRAM) 50 mg tablet Take 50 mg by mouth every six (6) hours as needed for Pain.      metFORMIN ER (GLUCOPHAGE XR) 500 mg tablet Take 1 Tab by mouth daily. Qty: 180 Tab, Refills: 3      sucralfate (CARAFATE) 1 gram tablet Take 1 g by mouth four (4) times daily. omeprazole (PRILOSEC) 40 mg capsule Take 1 Cap by mouth daily. Qty: 30 Cap, Refills: 0      empagliflozin (JARDIANCE) 25 mg tablet Take 1 Tab by mouth daily. Qty: 90 Tab, Refills: 3    Associated Diagnoses: Type 2 diabetes mellitus without complication, without long-term current use of insulin ([facility])      tamsulosin (FLOMAX) 0.4 mg capsule TAKE ONE CAPSULE BY MOUTH EVERY EVENING  Refills: 0      ramipril (ALTACE) 10 mg capsule Take 1 Cap by mouth nightly. Qty: 90 Cap, Refills: 3      cyanocobalamin (VITAMIN B12) 1,000 mcg/mL injection INJECT CONTENTS OF ONE VIAL INTRAMUSCULARLY EVERY OTHER WEEK  Qty: 6 mL, Refills: 6      alpha-D-galactosidase (BEANO PO) Take 1 Tab by mouth two (2) times a day. cetirizine (ZYRTEC) 10 mg tablet Take 10 mg by mouth nightly.       ondansetron (ZOFRAN ODT) 4 mg disintegrating tablet Take 1 Tab by mouth every eight (8) hours as needed for Nausea. Qty: 20 Tab, Refills: 0      sildenafil citrate (VIAGRA) 50 mg tablet Take 1 Tab by mouth as needed (ED). Qty: 30 Tab, Refills: 1             Follow-up Information     Follow up With Specialties Details Why Contact Info    Dina Mcgarry DO Internal Medicine In 1 week  [facility]  [address] [facility]      Corrie Georges MD Gastroenterology, Gastroenterology In 2 weeks  [address] [facility]  [address] [place] [address]  [phone]            Final discharge diagnoses and brief hospital course  A 62year old female patient with PMH of cholangiocarcinoma s/p pylorus-preserving Whipple 10/2017, HTN, DM, BPH and recurrent pancreatitis presented to ED for evaluation of abdominal pain since this early morning. Patient was recently discharged on 11/20/2019 for similar complaints. Patient had episodes of abdominal pain with nausea and vomiting since discharge. He was seen by his PCP on 12/2/2019. This morning he woke up with abdominal pain at 4 am, sever in nature, center of abdomen, feels like stabbing, radiates to back. Similar to his pain in the past. He was nauseous and had one vomiting. Denied fever or chills, alcohol intake , new medications, chest pain, sob, urinary or bowel changes. In ED, lipase was elevated. CT abdomen ordered. Hospitalist consulted for admission  12/4/2019     Acute recurrent pancreatitis - improved   - lipase >3000 on poa decreased to 630 today  - Leucocytosis - resolved   - Ct abd: Acute, uncomplicated pancreatitis; slightly less severe than 11/17/2019  - GI on board  -  IVF, pain medications  - concern for pancreatic stricture due to recurrent nature.   - tolerated GI lite, patient wants to go home  - plan for ERCP as outpt in 2 weeks per GI     Hx cholangiocarcinoma - s/p pylorus-preserving whipple      Diabetes mellitus type 2-  ISS   HTN- bp stable. BPH - flomax     Recommendations:  PCP f/u in 1 week  GI f/u in 2 weeks      Physical examination at discharge  Visit Vitals  /82 (BP 1 Location: Left arm, BP Patient Position: Sitting)   Pulse 72   Temp 97.7 °F (36.5 °C)   Resp 18   Ht 5' 8\" (1.727 m)   Wt 77.1 kg (170 lb)   SpO2 94%   BMI 25.85 kg/m²     Gen: no distress  HEENT: anicteric sclerae, normal conjunctiva, oropharynx clear, MM moist  Neck: supple, trachea midline, no adenopathy  Heart: RRR, no MRG, no JVD, no peripheral edema  Lungs: CTA b/l, non-labored respirations  Abd: soft, no tenderness  Extr: warm  Skin: dry, no rash  Neuro: CN II-XII grossly intact, normal speech, moves all extremities  Psych: normal mood, appropriate affect       Pertinent imaging studies:     CT abd: Acute, uncomplicated pancreatitis; slightly less severe than 11/17/2019      Recent Labs     12/06/19 0623 12/04/19  0821   WBC 6.3 14.7*   HGB 14.8 15.7   HCT 44.5 49.2   * 141*     Recent Labs     12/06/19 0623 12/04/19  0821    141   K 3.4* 3.5    107   CO2 23 25   BUN 9 18   CREA 0.66* 0.97   * 189*   CA 9.2 9.1     Recent Labs     12/06/19 0623 12/04/19  0821   SGOT  --  13*   AP  --  148*   TP  --  6.8   ALB  --  3.9   GLOB  --  2.9   LPSE 630* >3,000*     Recent Labs     12/04/19  0821   INR 1.0   PTP 10.6      No results for input(s): FE, TIBC, PSAT, FERR in the last 72 hours. No results for input(s): PH, PCO2, PO2 in the last 72 hours. No results for input(s): CPK, CKMB in the last 72 hours.     No lab exists for component: TROPONINI  No components found for: [place]    Chronic Diagnoses:    Problem List as of 12/6/2019 Date Reviewed: 12/2/2019          Codes Class Noted - Resolved    Pancreatitis ICD-10-CM: K85.90  ICD-9-CM: 318.4  11/17/2019 - Present        Acute pancreatitis ICD-10-CM: K85.90  ICD-9-CM: 162.4  8/16/2018 - Present        Leukocytosis ICD-10-CM: J12.495  ICD-9-CM: 288.60  8/16/2018 - Present        Controlled type 2 diabetes mellitus without complication, without long-term current use of insulin (HCC) (Chronic) ICD-10-CM: E11.9  ICD-9-CM: 250.00  7/30/2018 - Present        Vomiting ICD-10-CM: R11.10  ICD-9-CM: 787.03  7/5/2018 - Present        Paroxysmal atrial fibrillation (HCC) (Chronic) ICD-10-CM: I48.0  ICD-9-CM: 427.31  10/26/2017 - Present        S/P ablation of atrial fibrillation (Chronic) ICD-10-CM: U42.039, Z86.79  ICD-9-CM: V45.89  10/26/2017 - Present        Essential hypertension (Chronic) ICD-10-CM: I10  ICD-9-CM: 401.9  10/26/2017 - Present        Cramps, muscle, general ICD-10-CM: R25.2  ICD-9-CM: 729.82  10/26/2017 - Present        Low vitamin D level (Chronic) ICD-10-CM: R79.89  ICD-9-CM: 790.6  10/26/2017 - Present        Low vitamin B12 level (Chronic) ICD-10-CM: E53.8  ICD-9-CM: 266.2  10/26/2017 - Present        Cholangiocarcinoma determined by biopsy of biliary tract ([facility] ICD-10-CM: C24.9  ICD-9-CM: 156.9  10/6/2017 - Present        Cholangiocarcinoma of biliary tract ([facility] ICD-10-CM: C24.9  ICD-9-CM: 156.9  9/11/2017 - Present        Common bile duct (CBD) obstruction ICD-10-CM: K83.1  ICD-9-CM: 576.2  7/25/2017 - Present        UTI (urinary tract infection) ICD-10-CM: N39.0  ICD-9-CM: 599.0  7/25/2017 - Present        Obstructive jaundice ICD-10-CM: K83.1  ICD-9-CM: 576.2  7/23/2017 - Present        RESOLVED: Pancreatitis ICD-10-CM: K85.90  ICD-9-CM: 577.0  8/31/2018 - 8/31/2018        RESOLVED: Sjogren's disease ([facility] (Chronic) ICD-10-CM: M35.00  ICD-9-CM: 710.2  10/26/2017 - 12/2/2019        RESOLVED: Sepsis ([facility] ICD-10-CM: A41.9  ICD-9-CM: 038.9, 995.91  8/25/2017 - 8/31/2017              Time spent on discharge related activities today greater than 30 minutes.       Signed:  José Miguel Richter MD                 Hospitalist, Internal Medicine      Cc: Chaya Coreas DO

## 2019-12-06 NOTE — PROGRESS NOTES
[address]  [address] [address] [address], [address]  [phone]                GI PROGRESS NOTE      NAME:   Mikala Alonso   :    1955   MRN:    [id number]     Assessment/Plan   Acute Pancreatitis- likely PD anastamotic stricture   Pain control and may have clear liquids  ERCP once acute flare resolves     Patient Active Problem List   Diagnosis Code    Obstructive jaundice K83.1    Common bile duct (CBD) obstruction K83.1    UTI (urinary tract infection) N39.0    Cholangiocarcinoma of biliary tract (HCC) C24.9    Cholangiocarcinoma determined by biopsy of biliary tract (HCC) C24.9    Paroxysmal atrial fibrillation (HCC) I48.0    S/P ablation of atrial fibrillation Z98.890, Z86.79    Essential hypertension I10    Cramps, muscle, general R25.2    Low vitamin D level R79.89    Low vitamin B12 level E53.8    Vomiting R11.10    Controlled type 2 diabetes mellitus without complication, without long-term current use of insulin (HCC) E11.9    Acute pancreatitis K85.90    Leukocytosis D72.829    Pancreatitis K85.90       Subjective:     Mikala Alonso is a 62 y.o.  male who still has some nausea and had bilious emesis. Pain persistent but improving. Review of Systems    Constitutional: negative fever, negative chills, negative weight loss  Eyes:   negative visual changes  ENT:   negative sore throat, tongue or lip swelling  Respiratory:  negative cough, negative dyspnea  Cards:  negative for chest pain, palpitations, lower extremity edema  GI:   See HPI  :  negative for frequency, dysuria  Integument:  negative for rash and pruritus  Heme:  negative for easy bruising and gum/nose bleeding  Musculoskel: negative for myalgias,  back pain and muscle weakness  Neuro: negative for headaches, dizziness, vertigo  Psych:  negative for feelings of anxiety, depression           Objective:     VITALS:   Last 24hrs VS reviewed since prior hospitalist progress note.  Most recent are:  Visit Vitals  /69 (BP 1 Location: Right arm, BP Patient Position: At rest)   Pulse 66   Temp 97.3 °F (36.3 °C)   Resp 18   Ht 5' 8\" (1.727 m)   Wt 77.1 kg (170 lb)   SpO2 95%   BMI 25.85 kg/m²       Intake/Output Summary (Last 24 hours) at 12/5/2019 2004  Last data filed at 12/5/2019 4968  Gross per 24 hour   Intake 2801 ml   Output    Net 2801 ml        PHYSICAL EXAM:  General   well developed, well nourished, appears stated age, in no acute distress  EENT  Normocephalic, Atraumatic, PERRLA, EOMI, sclera clear, nares clear, pharynx normal  Neck   Supple without nodes or mass. No thyromegaly or bruit  Respiratory   Clear To Auscultation bilaterally - no wheezes, rales, rhonchi, or crackles  Cardiology  Regular Rate and Rythmn  - no murmurs, rubs or gallops  Abdominal  Soft, non-tender, non-distended, positive bowel sounds, no hepatosplenomegaly, no palpable mass  Extremities  No clubbing, cyanosis, or edema. Pulses intact. Back  No spinal or muscle pain. No CVAT. Skin  Normal skin turgor. No rashes or skin ulcers noted  Neurological  No focal neurological deficits noted  Psychological  Oriented x 3. Normal affect.        Lab Data   Recent Results (from the past 12 hour(s))   GLUCOSE, POC    Collection Time: 12/05/19 12:24 PM   Result Value Ref Range    Glucose (POC) 78 65 - 100 mg/dL    Performed by Remus Comber, POC    Collection Time: 12/05/19 12:47 PM   Result Value Ref Range    Glucose (POC) 69 65 - 100 mg/dL    Performed by Remus Comber, POC    Collection Time: 12/05/19  1:08 PM   Result Value Ref Range    Glucose (POC) 105 (H) 65 - 100 mg/dL    Performed by Dulce Maria Mistry    GLUCOSE, POC    Collection Time: 12/05/19  4:17 PM   Result Value Ref Range    Glucose (POC) 92 65 - 100 mg/dL    Performed by Koki Hylton          Medications: Reviewed    PMH/ reviewed - no change compared to H&P  Attending Physician: Inez Oneill MD   Date/Time:  12/5/2019

## 2019-12-06 NOTE — PROGRESS NOTES
[address] [facility] [address]  217 [facility] [address]   [phone]                GI PROGRESS NOTE  Annamaria Hess, [facility]  Work Cell: [phone]      NAME:   Hyun Antunez   :    1955   MRN:    [id number]     Assessment/Plan   1. Recurrent acute pancreatitis - 3rd occurrence since whipple procedure, CT w/ mild PD dilation, concern for possible stricture. Previous triglyceride and IgG4 normal. Symptoms and lipase improved. - Advance to full liquids  - Supportive management per primary team  - Trend labs  - ERCP as outpatient once acute flare resolves      2. Hx cholangiocarcinoma - s/p pylorus-preserving whipple   3. DM      Patient Active Problem List   Diagnosis Code    Obstructive jaundice K83.1    Common bile duct (CBD) obstruction K83.1    UTI (urinary tract infection) N39.0    Cholangiocarcinoma of biliary tract (HCC) C24.9    Cholangiocarcinoma determined by biopsy of biliary tract (HCC) C24.9    Paroxysmal atrial fibrillation (HCC) I48.0    S/P ablation of atrial fibrillation Z98.890, Z86.79    Essential hypertension I10    Cramps, muscle, general R25.2    Low vitamin D level R79.89    Low vitamin B12 level E53.8    Vomiting R11.10    Controlled type 2 diabetes mellitus without complication, without long-term current use of insulin (HCC) E11.9    Acute pancreatitis K85.90    Leukocytosis D72.829    Pancreatitis K85.90       Subjective:     Feeling much better. Denies any abdominal pain, nausea or vomiting at present. Moise Kearns to go home. Objective:     VITALS:   Last 24hrs VS reviewed since prior hospitalist progress note.  Most recent are:  Visit Vitals  /85 (BP 1 Location: Left arm, BP Patient Position: Sitting)   Pulse 71   Temp 98.9 °F (37.2 °C)   Resp 17   Ht 5' 8\" (1.727 m)   Wt 77.1 kg (170 lb)   SpO2 96%   BMI 25.85 kg/m²       Intake/Output Summary (Last 24 hours) at 2019 0958  Last data filed at 2019 2220  Gross per 24 hour Intake 1584.31 ml   Output    Net 1584.31 ml        PHYSICAL EXAM:  General   well developed, alert, in no acute distress  EENT  Normocephalic, Atraumatic, PERRLA, EOMI, sclera clear  Respiratory   Clear To Auscultation bilaterally - no wheezes, rales, rhonchi, or crackles  Cardiology  Regular Rate and Rythmn  - no murmurs, rubs or gallops  Abdominal  Soft, non-tender, non-distended, positive bowel sounds, no hepatosplenomegaly, no palpable mass  Neurological  No focal neurological deficits noted  Psychological  Oriented x 3. Normal affect. Lab Data   Recent Results (from the past 12 hour(s))   GLUCOSE, POC    Collection Time: 12/06/19 12:02 AM   Result Value Ref Range    Glucose (POC) 101 (H) 65 - 100 mg/dL    Performed by Johnnie Ames    CBC WITH AUTOMATED DIFF    Collection Time: 12/06/19  6:23 AM   Result Value Ref Range    WBC 6.3 4.1 - 11.1 K/uL    RBC 5.01 4.10 - 5.70 M/uL    HGB 14.8 12.1 - 17.0 g/dL    HCT 44.5 36.6 - 50.3 %    MCV 88.8 80.0 - 99.0 FL    MCH 29.5 26.0 - 34.0 PG    MCHC 33.3 30.0 - 36.5 g/dL    RDW 13.9 11.5 - 14.5 %    PLATELET 708 (L) 094 - 400 K/uL    MPV 10.7 8.9 - 12.9 FL    NRBC 0.0 0  WBC    ABSOLUTE NRBC 0.00 0.00 - 0.01 K/uL    NEUTROPHILS 74 32 - 75 %    LYMPHOCYTES 12 12 - 49 %    MONOCYTES 8 5 - 13 %    EOSINOPHILS 5 0 - 7 %    BASOPHILS 1 0 - 1 %    IMMATURE GRANULOCYTES 1 (H) 0.0 - 0.5 %    ABS. NEUTROPHILS 4.6 1.8 - 8.0 K/UL    ABS. LYMPHOCYTES 0.8 0.8 - 3.5 K/UL    ABS. MONOCYTES 0.5 0.0 - 1.0 K/UL    ABS. EOSINOPHILS 0.3 0.0 - 0.4 K/UL    ABS. BASOPHILS 0.1 0.0 - 0.1 K/UL    ABS. IMM.  GRANS. 0.1 (H) 0.00 - 0.04 K/UL    DF AUTOMATED     METABOLIC PANEL, BASIC    Collection Time: 12/06/19  6:23 AM   Result Value Ref Range    Sodium 138 136 - 145 mmol/L    Potassium 3.4 (L) 3.5 - 5.1 mmol/L    Chloride 106 97 - 108 mmol/L    CO2 23 21 - 32 mmol/L    Anion gap 9 5 - 15 mmol/L    Glucose 121 (H) 65 - 100 mg/dL    BUN 9 6 - 20 MG/DL    Creatinine 0.66 (L) 0.70 - 1.30 MG/DL    BUN/Creatinine ratio 14 12 - 20      GFR est AA >60 >60 ml/min/1.73m2    GFR est non-AA >60 >60 ml/min/1.73m2    Calcium 9.2 8.5 - 10.1 MG/DL   LIPASE    Collection Time: 12/06/19  6:23 AM   Result Value Ref Range    Lipase 630 (H) 73 - 393 U/L   GLUCOSE, POC    Collection Time: 12/06/19  6:23 AM   Result Value Ref Range    Glucose (POC) 108 (H) 65 - 100 mg/dL    Performed by Marleny Clark          Medications: Reviewed    PMH/ reviewed - no change compared to H&P  Mid-Level Provider: Luigi Nolasco NP   Date/Time:  12/6/2019

## 2019-12-09 ENCOUNTER — PATIENT OUTREACH (OUTPATIENT)
Dept: OTHER | Age: 64
End: 2019-12-09

## 2019-12-09 NOTE — PROGRESS NOTES
Transition Of Care Note    Patient discharged from [facility] admitted on 19 and discharged on 19. Diagnosis: Recurrent acute pancreatitis - 3rd occurrence since whipple procedure. Pt was previously admitted on 19 - 19. Diagnosis: Pancreatitis.      Acute recurrent pancreatitis - improved   - lipase >3000 on poa decreased to 630 today  - Leucocytosis - resolved   - Ct abd: Acute, uncomplicated pancreatitis; slightly less severe than 2019  - GI on board  -  IVF, pain medications  - concern for pancreatic stricture due to recurrent nature. - tolerated GI lite, patient wants to go home  - plan for ERCP as outpt in 2 weeks per GI     Hx cholangiocarcinoma - s/p pylorus-preserving whipple      Medical History:     Past Medical History:   Diagnosis Date    Arrhythmia     Previous a.fib, ablation, NSR now; NO LONGER FOLLOWED BY CARDIOLOGIST.  Autoimmune disease ([facility] [address]     SJOGREN'S    Cancer ([facility] [address]     COMMON BILE DUCT, ADENOCARCINOMA    Diabetes ([facility] [address] [facility]     Family history of skin cancer     Hypertension     Sepsis ([facility] [address] 2017    STENTING TO BILE DUCT    Status post chemotherapy     Stroke [facility])     brain aneurysm - no deficits    Sun-damaged skin     Sunburn, blistering        Care Manager contacted the patient by telephone to perform post hospital discharge assessment. Verified  and zip code with patient as identifiers. Provided introduction to self, and explanation of the Nurse Care Manager role. [facility] - spouse    Pt reported he is doing well today. Denies pain, N/V, diarrhea, constipation; BM today 19, denies blood. Denies temp, pain/swelling with legs feet. Appetite/hydration good. Diet primarily consists of soups, apple juice and water at this time. Inquired about BS? Did not check today, but will check ASAP and daily in AM. Only checks BS daily in AM.     Patient's primary care provider relationship reviewed with patient and modified, as applicable.     Red Flags:  chest pain, shortness of breath, fever, chills, nausea, vomiting, diarrhea, change in mentation,  falling, weakness, bleeding    Condition Focused Assessment:   Patient reports the following: Gastrointestinal Condition Focused Assessment    Skin- any open wounds, incisions or appliance no  Description of wound- n/a  New or worsening pain? no  If yes, pain rated 0-10: 0 Location/pain characteristics: n/a   New or worsening numbness or tingling? no  If yes, location of numbness and tingling: n/a  Activity level- moving several times a day, or as recommended? yes  Abnormal activity level reported: no   Nutrition- prescribed diet? yes   Diet prescribed or recommended: diabetic  Difficulty swallowing no  Last weight of 170 lbs on 12/4/19  Last BM on 12/9/19 abnormal consistency or amount no  Abnormal labs yes     In the last 24 hour have you experienced; Fever no  Low body temperature no  Chills or shaking no  Sweating no  Fast heart rate no  Fast breathing no  Dizziness/lightheadedness no  Confusion or unusual change in mental status no  Diarrhea no  Nausea no   Vomiting no  Shortness of breath or difficulty breathing no  Less urine output no  Cold, clammy, and pale skin no  Skin rash or skin color changes no      Medication:   New Medications at Discharge: no  Changed Medications at Discharge: no  Discontinued Medications at Discharge: no    Current Outpatient Medications   Medication Sig    traMADol (ULTRAM) 50 mg tablet Take 50 mg by mouth every six (6) hours as needed for Pain.  metFORMIN ER (GLUCOPHAGE XR) 500 mg tablet Take 1 Tab by mouth daily.  sucralfate (CARAFATE) 1 gram tablet Take 1 g by mouth four (4) times daily.  omeprazole (PRILOSEC) 40 mg capsule Take 1 Cap by mouth daily.  empagliflozin (JARDIANCE) 25 mg tablet Take 1 Tab by mouth daily.  tamsulosin (FLOMAX) 0.4 mg capsule TAKE ONE CAPSULE BY MOUTH EVERY EVENING    ramipril (ALTACE) 10 mg capsule Take 1 Cap by mouth nightly.  cyanocobalamin (VITAMIN B12) 1,000 mcg/mL injection INJECT CONTENTS OF ONE VIAL INTRAMUSCULARLY EVERY OTHER WEEK    alpha-D-galactosidase (BEANO PO) Take 1 Tab by mouth two (2) times a day.  cetirizine (ZYRTEC) 10 mg tablet Take 10 mg by mouth nightly.  ondansetron (ZOFRAN ODT) 4 mg disintegrating tablet Take 1 Tab by mouth every eight (8) hours as needed for Nausea.  sildenafil citrate (VIAGRA) 50 mg tablet Take 1 Tab by mouth as needed (ED). No current facility-administered medications for this visit. There are no discontinued medications. Performed medication reconciliation with patient, and patient verbalizes understanding of administration of home medications. There were no barriers to obtaining medications identified at this time. Inpatient RRAT score: 13  Was this a readmission? yes   Patient stated reason for the readmission: just started having pain s/p wipple. Barriers/Support system:  patient and spouse    Home health/DME in place per discharge orders    Barriers/Challenges to Care: []  Decline in memory    []  Language barrier     []  Emotional                  []  Limited mobility  [x]  Lack of motivation     [] Vision, hearing or cognitive impairment [x]  Knowledge [] Financial Barriers []  Lack of support  []  Pain []  Other []  None    CM Identified  Problems    (contributing problems for risk for readmission)  1. Risk for impaired health maintenance  2. Knowledge Deficit    Goals      Completes all FU appointments as ordered at [place]      · Assess patient's current effectiveness of condition self-management;  · Identify any barriers to FU appointments or care access;  · Determine patient's current access to PCP services - Patient has follow up with Dr. Farshad Novak on 12/2.     11/27: PCP on 12/2. CT end of December, waiting to hear from hospital.    12/96/19 PCP F/U TBD - pt will call today to schedule f/u in 3 - 5 days.  GI - TBD - pt will call today to schedule for within 1 - 2 weeks       Demonstrates no signs of complications or red flags in 30 days       Review and discuss importance of monitoring and reporting red flags;   Review medications and when taken with patient to ensure understanding;   Educate patient when to call the physician or 911;  · Assess for any barriers with care access or mobility needs at home    11/27: No red flags. 12/9/19  Pt will monitor for chest pain, shortness of breath, fever, chills, nausea, vomiting, diarrhea, change in mentation, falling, weakness, bleeding    Lab Results   Component Value Date/Time    Lipase 630 (H) 12/06/2019 06:23 AM                 Discharge Instructions :  Reviewed discharge instructions with patient. Patient verbalizes understanding of discharge instructions and follow-up care. Advance Care Planning:   Patient was offered the opportunity to discuss advance care planning:  no     Does patient have an Advance Directive:  no   If no, did you provide information on Caring Connections?  no     PCP/Specialist follow up: Patient scheduled to follow up with Katlyn Sue, DO - TBD. Pt will call today for f/u in 3 - 5 days  GI - TBD, pt will call today for f/u in 1-2 weeks  . Future Appointments   Date Time Provider Sloane Roach   2/3/2020  8:30 AM Jennifer Durant MD RDE [facility] [address]   2/27/2020  9:00 AM [facility] 2 [facility]   2/28/2020  9:15 AM [facility]   4/6/2020  8:45 AM Elver, [facility]   7/27/2020 11:00 AM NIKI Brooks      Reviewed red flags with patient, and patient verbalizes understanding. Patient given an opportunity to ask questions. No other clinical/social/functional needs noted. The patient agrees to contact the PCP office for questions related to their healthcare. The patient expressed thanks, offered no additional questions and ended the call.       CM will f/u in 1 week

## 2019-12-09 NOTE — Clinical Note
Doing well, no concerns at this time. Pt was suppose to call GI and PCP yesterday PM to schedule f/u appt's. I told him you will call and f/u in 1 week.  Thanks

## 2019-12-16 ENCOUNTER — PATIENT OUTREACH (OUTPATIENT)
Dept: OTHER | Age: 64
End: 2019-12-16

## 2019-12-16 NOTE — PROGRESS NOTES
Follow up phone call to patient, two pt identifiers verified. Discussed patient's goals:   Goals      Completes all FU appointments as ordered at [place]      · Assess patient's current effectiveness of condition self-management;  · Identify any barriers to FU appointments or care access;  · Determine patient's current access to PCP services - Patient has follow up with Dr. Jesse Wright on 12/2.     11/27: PCP on 12/2. CT end of December, waiting to hear from hospital.    12/19 PCP F/U TBD - pt will call today to schedule f/u in 3 - 5 days. GI - TBD - pt will call today to schedule for within 1 - 2 weeks    12/16: Patient has an appointment with Dr. Jesse Wright on 1/17. Will be scheduled for surgery to have stent placed soon.  Demonstrates no signs of complications or red flags in 30 days       Review and discuss importance of monitoring and reporting red flags;   Review medications and when taken with patient to ensure understanding;   Educate patient when to call the physician or 911;  · Assess for any barriers with care access or mobility needs at home    11/27: No red flags. 12/9/19  Pt will monitor for chest pain, shortness of breath, fever, chills, nausea, vomiting, diarrhea, change in mentation, falling, weakness, bleeding    Lab Results   Component Value Date/Time    Lipase 630 (H) 12/06/2019 06:23 AM     12/16: Patient states he had to go to the ED because his scar tissues was inflamed from previous surgery. They are planning to put in a stent once scar tissue is less inflamed. Patient verbalized understanding for when to call 911 and when to call his provider. Patient's primary care provider relationship reviewed with patient and modified, as applicable.     Readiness to Change: []  Pre-contemplative    []  Contemplative  []  Preparation               [x]  Action                  []  Maintenance    Barriers/Challenges to Care: []  Decline in memory    []  Language barrier     [] Emotional                  []  Limited mobility  []  Lack of motivation     [] Vision, hearing or cognitive impairment []  Knowledge [] Financial Barriers []  Lack of support  []  Pain []  Other [x]  None    Key pt activities to achieve better health:   []  Weight loss  []  Improved diabetic control  []  Decreased cholesterol levels  []  Decreased blood pressure  [x]   Take medications, as prescribed. []    Upcoming appointments:   Future Appointments   Date Time Provider [facility]   1/17/2020 10:30 AM [facility]   2/3/2020  8:30 AM Shelby Ames MD RDE [facility] [address]   2/27/2020  9:00 AM [facility]   2/28/2020  9:15 AM Nicole Stoll MD [facility] NURIS SCHED   4/6/2020  8:45 AM Reed Wills DO [facility] NURIS SCHED   7/27/2020 11:00 AM NIKI Lackey 2 for next call: Will call in one week to check in. Red flags?

## 2019-12-26 ENCOUNTER — PATIENT OUTREACH (OUTPATIENT)
Dept: OTHER | Age: 64
End: 2019-12-26

## 2019-12-31 ENCOUNTER — HOSPITAL ENCOUNTER (INPATIENT)
Age: 64
LOS: 2 days | Discharge: HOME | DRG: 440 | End: 2020-01-02
Attending: EMERGENCY MEDICINE | Admitting: FAMILY MEDICINE
Payer: COMMERCIAL

## 2019-12-31 ENCOUNTER — APPOINTMENT (OUTPATIENT)
Dept: CT IMAGING | Age: 64
DRG: 440 | End: 2019-12-31
Attending: EMERGENCY MEDICINE
Payer: COMMERCIAL

## 2019-12-31 DIAGNOSIS — E11.65 TYPE 2 DIABETES MELLITUS WITH HYPERGLYCEMIA, WITHOUT LONG-TERM CURRENT USE OF INSULIN (HCC): ICD-10-CM

## 2019-12-31 DIAGNOSIS — K85.90 ACUTE PANCREATITIS, UNSPECIFIED COMPLICATION STATUS, UNSPECIFIED PANCREATITIS TYPE: Primary | ICD-10-CM

## 2019-12-31 PROBLEM — R10.9 ABDOMINAL PAIN: Status: ACTIVE | Noted: 2019-12-31

## 2019-12-31 LAB
ALBUMIN SERPL-MCNC: 4.1 G/DL (ref 3.5–5)
ALBUMIN/GLOB SERPL: 1.5 {RATIO} (ref 1.1–2.2)
ALP SERPL-CCNC: 143 U/L (ref 45–117)
ALT SERPL-CCNC: 41 U/L (ref 12–78)
ANION GAP SERPL CALC-SCNC: 7 MMOL/L (ref 5–15)
APPEARANCE UR: CLEAR
AST SERPL-CCNC: 18 U/L (ref 15–37)
BASOPHILS # BLD: 0.1 K/UL (ref 0–0.1)
BASOPHILS NFR BLD: 0 % (ref 0–1)
BILIRUB SERPL-MCNC: 1.6 MG/DL (ref 0.2–1)
BILIRUB UR QL: NEGATIVE
BUN SERPL-MCNC: 19 MG/DL (ref 6–20)
BUN/CREAT SERPL: 23 (ref 12–20)
CALCIUM SERPL-MCNC: 9.4 MG/DL (ref 8.5–10.1)
CHLORIDE SERPL-SCNC: 106 MMOL/L (ref 97–108)
CO2 SERPL-SCNC: 28 MMOL/L (ref 21–32)
COLOR UR: ABNORMAL
COMMENT, HOLDF: NORMAL
CREAT SERPL-MCNC: 0.83 MG/DL (ref 0.7–1.3)
DIFFERENTIAL METHOD BLD: ABNORMAL
EOSINOPHIL # BLD: 0.2 K/UL (ref 0–0.4)
EOSINOPHIL NFR BLD: 1 % (ref 0–7)
ERYTHROCYTE [DISTWIDTH] IN BLOOD BY AUTOMATED COUNT: 14.6 % (ref 11.5–14.5)
GLOBULIN SER CALC-MCNC: 2.7 G/DL (ref 2–4)
GLUCOSE BLD STRIP.AUTO-MCNC: 108 MG/DL (ref 65–100)
GLUCOSE BLD STRIP.AUTO-MCNC: 124 MG/DL (ref 65–100)
GLUCOSE BLD STRIP.AUTO-MCNC: 94 MG/DL (ref 65–100)
GLUCOSE SERPL-MCNC: 143 MG/DL (ref 65–100)
GLUCOSE UR STRIP.AUTO-MCNC: >1000 MG/DL
HCT VFR BLD AUTO: 49.8 % (ref 36.6–50.3)
HGB BLD-MCNC: 15.8 G/DL (ref 12.1–17)
HGB UR QL STRIP: NEGATIVE
IMM GRANULOCYTES # BLD AUTO: 0.1 K/UL (ref 0–0.04)
IMM GRANULOCYTES NFR BLD AUTO: 1 % (ref 0–0.5)
KETONES UR QL STRIP.AUTO: 15 MG/DL
LACTATE BLD-SCNC: 0.98 MMOL/L (ref 0.4–2)
LEUKOCYTE ESTERASE UR QL STRIP.AUTO: NEGATIVE
LIPASE SERPL-CCNC: >3000 U/L (ref 73–393)
LYMPHOCYTES # BLD: 0.9 K/UL (ref 0.8–3.5)
LYMPHOCYTES NFR BLD: 7 % (ref 12–49)
MAGNESIUM SERPL-MCNC: 2.4 MG/DL (ref 1.6–2.4)
MCH RBC QN AUTO: 29.6 PG (ref 26–34)
MCHC RBC AUTO-ENTMCNC: 31.7 G/DL (ref 30–36.5)
MCV RBC AUTO: 93.3 FL (ref 80–99)
MONOCYTES # BLD: 0.8 K/UL (ref 0–1)
MONOCYTES NFR BLD: 6 % (ref 5–13)
NEUTS SEG # BLD: 11.5 K/UL (ref 1.8–8)
NEUTS SEG NFR BLD: 85 % (ref 32–75)
NITRITE UR QL STRIP.AUTO: NEGATIVE
NRBC # BLD: 0 K/UL (ref 0–0.01)
NRBC BLD-RTO: 0 PER 100 WBC
PH UR STRIP: 8 [PH] (ref 5–8)
PLATELET # BLD AUTO: 123 K/UL (ref 150–400)
PMV BLD AUTO: 11.5 FL (ref 8.9–12.9)
POTASSIUM SERPL-SCNC: 3.6 MMOL/L (ref 3.5–5.1)
PROT SERPL-MCNC: 6.8 G/DL (ref 6.4–8.2)
PROT UR STRIP-MCNC: NEGATIVE MG/DL
RBC # BLD AUTO: 5.34 M/UL (ref 4.1–5.7)
SAMPLES BEING HELD,HOLD: NORMAL
SERVICE CMNT-IMP: ABNORMAL
SERVICE CMNT-IMP: ABNORMAL
SERVICE CMNT-IMP: NORMAL
SODIUM SERPL-SCNC: 141 MMOL/L (ref 136–145)
SP GR UR REFRACTOMETRY: 1.01
UR CULT HOLD, URHOLD: NORMAL
UROBILINOGEN UR QL STRIP.AUTO: 1 EU/DL (ref 0.2–1)
WBC # BLD AUTO: 13.5 K/UL (ref 4.1–11.1)

## 2019-12-31 PROCEDURE — C9113 INJ PANTOPRAZOLE SODIUM, VIA: HCPCS | Performed by: EMERGENCY MEDICINE

## 2019-12-31 PROCEDURE — 96375 TX/PRO/DX INJ NEW DRUG ADDON: CPT

## 2019-12-31 PROCEDURE — 74011250637 HC RX REV CODE- 250/637: Performed by: FAMILY MEDICINE

## 2019-12-31 PROCEDURE — 74011250636 HC RX REV CODE- 250/636: Performed by: EMERGENCY MEDICINE

## 2019-12-31 PROCEDURE — 96372 THER/PROPH/DIAG INJ SC/IM: CPT

## 2019-12-31 PROCEDURE — 99284 EMERGENCY DEPT VISIT MOD MDM: CPT

## 2019-12-31 PROCEDURE — 85025 COMPLETE CBC W/AUTO DIFF WBC: CPT

## 2019-12-31 PROCEDURE — 81003 URINALYSIS AUTO W/O SCOPE: CPT

## 2019-12-31 PROCEDURE — 65270000032 HC RM SEMIPRIVATE

## 2019-12-31 PROCEDURE — 74011000258 HC RX REV CODE- 258: Performed by: RADIOLOGY

## 2019-12-31 PROCEDURE — 83605 ASSAY OF LACTIC ACID: CPT

## 2019-12-31 PROCEDURE — 96374 THER/PROPH/DIAG INJ IV PUSH: CPT

## 2019-12-31 PROCEDURE — 74177 CT ABD & PELVIS W/CONTRAST: CPT

## 2019-12-31 PROCEDURE — 80053 COMPREHEN METABOLIC PANEL: CPT

## 2019-12-31 PROCEDURE — 83735 ASSAY OF MAGNESIUM: CPT

## 2019-12-31 PROCEDURE — 74011636320 HC RX REV CODE- 636/320: Performed by: RADIOLOGY

## 2019-12-31 PROCEDURE — 74011000250 HC RX REV CODE- 250: Performed by: EMERGENCY MEDICINE

## 2019-12-31 PROCEDURE — 36415 COLL VENOUS BLD VENIPUNCTURE: CPT

## 2019-12-31 PROCEDURE — 74011250636 HC RX REV CODE- 250/636: Performed by: FAMILY MEDICINE

## 2019-12-31 PROCEDURE — 82962 GLUCOSE BLOOD TEST: CPT

## 2019-12-31 PROCEDURE — 83690 ASSAY OF LIPASE: CPT

## 2019-12-31 RX ORDER — DICYCLOMINE HYDROCHLORIDE 10 MG/ML
20 INJECTION INTRAMUSCULAR
Status: COMPLETED | OUTPATIENT
Start: 2019-12-31 | End: 2019-12-31

## 2019-12-31 RX ORDER — INSULIN LISPRO 100 [IU]/ML
INJECTION, SOLUTION INTRAVENOUS; SUBCUTANEOUS
Status: DISCONTINUED | OUTPATIENT
Start: 2019-12-31 | End: 2020-01-02 | Stop reason: HOSPADM

## 2019-12-31 RX ORDER — SODIUM CHLORIDE 0.9 % (FLUSH) 0.9 %
5-40 SYRINGE (ML) INJECTION AS NEEDED
Status: DISCONTINUED | OUTPATIENT
Start: 2019-12-31 | End: 2020-01-02 | Stop reason: HOSPADM

## 2019-12-31 RX ORDER — SODIUM CHLORIDE 0.9 % (FLUSH) 0.9 %
10 SYRINGE (ML) INJECTION
Status: COMPLETED | OUTPATIENT
Start: 2019-12-31 | End: 2019-12-31

## 2019-12-31 RX ORDER — MORPHINE SULFATE 2 MG/ML
2 INJECTION, SOLUTION INTRAMUSCULAR; INTRAVENOUS
Status: DISCONTINUED | OUTPATIENT
Start: 2019-12-31 | End: 2019-12-31

## 2019-12-31 RX ORDER — HYDROMORPHONE HYDROCHLORIDE 1 MG/ML
0.5 INJECTION, SOLUTION INTRAMUSCULAR; INTRAVENOUS; SUBCUTANEOUS
Status: DISCONTINUED | OUTPATIENT
Start: 2019-12-31 | End: 2020-01-02 | Stop reason: HOSPADM

## 2019-12-31 RX ORDER — MAGNESIUM SULFATE 100 %
4 CRYSTALS MISCELLANEOUS AS NEEDED
Status: DISCONTINUED | OUTPATIENT
Start: 2019-12-31 | End: 2020-01-02 | Stop reason: HOSPADM

## 2019-12-31 RX ORDER — ONDANSETRON 2 MG/ML
4 INJECTION INTRAMUSCULAR; INTRAVENOUS
Status: COMPLETED | OUTPATIENT
Start: 2019-12-31 | End: 2019-12-31

## 2019-12-31 RX ORDER — SODIUM CHLORIDE 9 MG/ML
75 INJECTION, SOLUTION INTRAVENOUS CONTINUOUS
Status: DISCONTINUED | OUTPATIENT
Start: 2019-12-31 | End: 2020-01-01

## 2019-12-31 RX ORDER — SODIUM CHLORIDE 0.9 % (FLUSH) 0.9 %
5-40 SYRINGE (ML) INJECTION EVERY 8 HOURS
Status: DISCONTINUED | OUTPATIENT
Start: 2019-12-31 | End: 2020-01-02 | Stop reason: HOSPADM

## 2019-12-31 RX ORDER — HYDROMORPHONE HYDROCHLORIDE 1 MG/ML
1 INJECTION, SOLUTION INTRAMUSCULAR; INTRAVENOUS; SUBCUTANEOUS
Status: COMPLETED | OUTPATIENT
Start: 2019-12-31 | End: 2019-12-31

## 2019-12-31 RX ORDER — DEXTROSE MONOHYDRATE 100 MG/ML
0-250 INJECTION, SOLUTION INTRAVENOUS AS NEEDED
Status: DISCONTINUED | OUTPATIENT
Start: 2019-12-31 | End: 2020-01-02 | Stop reason: HOSPADM

## 2019-12-31 RX ORDER — ONDANSETRON 2 MG/ML
4 INJECTION INTRAMUSCULAR; INTRAVENOUS
Status: DISCONTINUED | OUTPATIENT
Start: 2019-12-31 | End: 2020-01-02 | Stop reason: HOSPADM

## 2019-12-31 RX ORDER — OXYCODONE AND ACETAMINOPHEN 5; 325 MG/1; MG/1
1 TABLET ORAL
Status: DISCONTINUED | OUTPATIENT
Start: 2019-12-31 | End: 2020-01-02 | Stop reason: HOSPADM

## 2019-12-31 RX ORDER — ACETAMINOPHEN 325 MG/1
650 TABLET ORAL
Status: DISCONTINUED | OUTPATIENT
Start: 2019-12-31 | End: 2020-01-02 | Stop reason: HOSPADM

## 2019-12-31 RX ADMIN — SODIUM CHLORIDE 150 ML/HR: 900 INJECTION, SOLUTION INTRAVENOUS at 16:05

## 2019-12-31 RX ADMIN — SODIUM CHLORIDE 100 ML: 900 INJECTION, SOLUTION INTRAVENOUS at 08:40

## 2019-12-31 RX ADMIN — SODIUM CHLORIDE 40 MG: 9 INJECTION INTRAMUSCULAR; INTRAVENOUS; SUBCUTANEOUS at 08:00

## 2019-12-31 RX ADMIN — SODIUM CHLORIDE 150 ML/HR: 900 INJECTION, SOLUTION INTRAVENOUS at 09:57

## 2019-12-31 RX ADMIN — ONDANSETRON 4 MG: 2 INJECTION INTRAMUSCULAR; INTRAVENOUS at 16:06

## 2019-12-31 RX ADMIN — ONDANSETRON 4 MG: 2 INJECTION INTRAMUSCULAR; INTRAVENOUS at 19:37

## 2019-12-31 RX ADMIN — Medication 10 ML: at 22:51

## 2019-12-31 RX ADMIN — ONDANSETRON 4 MG: 2 INJECTION INTRAMUSCULAR; INTRAVENOUS at 11:59

## 2019-12-31 RX ADMIN — IOPAMIDOL 100 ML: 755 INJECTION, SOLUTION INTRAVENOUS at 08:40

## 2019-12-31 RX ADMIN — HYDROMORPHONE HYDROCHLORIDE 0.5 MG: 1 INJECTION, SOLUTION INTRAMUSCULAR; INTRAVENOUS; SUBCUTANEOUS at 19:37

## 2019-12-31 RX ADMIN — HYDROMORPHONE HYDROCHLORIDE 1 MG: 1 INJECTION, SOLUTION INTRAMUSCULAR; INTRAVENOUS; SUBCUTANEOUS at 08:55

## 2019-12-31 RX ADMIN — MORPHINE SULFATE 2 MG: 2 INJECTION, SOLUTION INTRAMUSCULAR; INTRAVENOUS at 12:53

## 2019-12-31 RX ADMIN — Medication 10 ML: at 08:40

## 2019-12-31 RX ADMIN — OXYCODONE HYDROCHLORIDE AND ACETAMINOPHEN 1 TABLET: 5; 325 TABLET ORAL at 11:10

## 2019-12-31 RX ADMIN — SODIUM CHLORIDE 150 ML/HR: 900 INJECTION, SOLUTION INTRAVENOUS at 22:50

## 2019-12-31 RX ADMIN — ONDANSETRON 4 MG: 2 INJECTION INTRAMUSCULAR; INTRAVENOUS at 08:00

## 2019-12-31 RX ADMIN — Medication 10 ML: at 13:00

## 2019-12-31 RX ADMIN — DICYCLOMINE HYDROCHLORIDE 20 MG: 10 INJECTION INTRAMUSCULAR at 08:00

## 2019-12-31 RX ADMIN — SODIUM CHLORIDE 1000 ML: 900 INJECTION, SOLUTION INTRAVENOUS at 08:00

## 2019-12-31 RX ADMIN — HYDROMORPHONE HYDROCHLORIDE 0.5 MG: 1 INJECTION, SOLUTION INTRAMUSCULAR; INTRAVENOUS; SUBCUTANEOUS at 16:06

## 2019-12-31 NOTE — CONSULTS
[address]  [address] [facility] [address]  [place], [address]  [phone]                     GI CONSULTATION NOTE  Gurmeet Pritchard AGACNP-BC  Work Cell: [phone]      NAME:  Refugio Guillermo   :   1955   MRN:   [id number]       Referring Provider: Gianfranco Piña NP    Consult Date: 2019     Chief Complaint: Abdominal pain    History of Present Illness:  Patient is a 62 y.o. who is seen in consultation at the request of Gianfranco Piña NP for pancreatitis.  has a PMH as detailed below including hx cholangiocarcinoma s/p pylorus-preserving Whipple 10/2017. He presented to the hospital with recurrent epigastric pain. Onset was early this AM. It is severe and sharp in nature w/ associated n/v. He was recently admitted for the same. Work-up revealing elevated lipase > 3000 and CT showed acute pancreatitis with unchanged PD dilation. PMH:  Past Medical History:   Diagnosis Date    Arrhythmia     Previous a.fib, ablation, NSR now; NO LONGER FOLLOWED BY CARDIOLOGIST.  Autoimmune disease ([address]     SJOGREN'S    Cancer ([address]     COMMON BILE DUCT, ADENOCARCINOMA    Diabetes ([address]     Family history of skin cancer     Hypertension     Sepsis ([address] 2017    STENTING TO BILE DUCT    Status post chemotherapy     Stroke [facility]) 2008    brain aneurysm - no deficits    Sun-damaged skin     Sunburn, blistering        PSH:  Past Surgical History:   Procedure Laterality Date    ABDOMEN SURGERY PROC UNLISTED  10/2017    Whipple     HX GI      COLONOSCOPY, POLYPS (BENIGN)    HX GI  10/06/2017    [facility]     [address]    Ablation     HX ORTHOPAEDIC      Spinal fusion W/ HARDWARE    HX OTHER SURGICAL  2017    [place] [facility], Dr. Gagan Kerr      PORTACATH - then removed    NEUROLOGICAL PROCEDURE UNLISTED      Elner Wen hole washout from cerebral hemorrhage       Allergies:   Allergies   Allergen Reactions    Shellfish Derived Anaphylaxis     Soft shell crabs    Pcn [Penicillins] Other (comments)     Pt stated \"always been told PCN\"  Pt tolerated other cephalosporins in the past       Home Medications:  Prior to Admission Medications   Prescriptions Last Dose Informant Patient Reported? Taking? alpha-D-galactosidase (BEANO PO)  Self Yes No   Sig: Take 1 Tab by mouth two (2) times a day. cetirizine (ZYRTEC) 10 mg tablet  Self Yes No   Sig: Take 10 mg by mouth nightly. cyanocobalamin (VITAMIN B12) 1,000 mcg/mL injection   No No   Sig: INJECT CONTENTS OF ONE VIAL INTRAMUSCULARLY EVERY OTHER WEEK   empagliflozin (JARDIANCE) 25 mg tablet   No No   Sig: Take 1 Tab by mouth daily. metFORMIN ER (GLUCOPHAGE XR) 500 mg tablet   No No   Sig: Take 1 Tab by mouth daily. omeprazole (PRILOSEC) 40 mg capsule   No No   Sig: Take 1 Cap by mouth daily. ondansetron (ZOFRAN ODT) 4 mg disintegrating tablet   No No   Sig: Take 1 Tab by mouth every eight (8) hours as needed for Nausea. ramipril (ALTACE) 10 mg capsule   No No   Sig: Take 1 Cap by mouth nightly. sildenafil citrate (VIAGRA) 50 mg tablet   No No   Sig: Take 1 Tab by mouth as needed (ED). sucralfate (CARAFATE) 1 gram tablet   Yes No   Sig: Take 1 g by mouth four (4) times daily. tamsulosin (FLOMAX) 0.4 mg capsule   Yes No   Sig: TAKE ONE CAPSULE BY MOUTH EVERY EVENING   traMADol (ULTRAM) 50 mg tablet   Yes No   Sig: Take 50 mg by mouth every six (6) hours as needed for Pain. Facility-Administered Medications: None       Hospital Medications:  Current Facility-Administered Medications   Medication Dose Route Frequency    0.9% sodium chloride infusion  150 mL/hr IntraVENous CONTINUOUS     Current Outpatient Medications   Medication Sig    traMADol (ULTRAM) 50 mg tablet Take 50 mg by mouth every six (6) hours as needed for Pain.  metFORMIN ER (GLUCOPHAGE XR) 500 mg tablet Take 1 Tab by mouth daily.     sucralfate (CARAFATE) 1 gram tablet Take 1 g by mouth four (4) times daily.  ondansetron (ZOFRAN ODT) 4 mg disintegrating tablet Take 1 Tab by mouth every eight (8) hours as needed for Nausea.  omeprazole (PRILOSEC) 40 mg capsule Take 1 Cap by mouth daily.  empagliflozin (JARDIANCE) 25 mg tablet Take 1 Tab by mouth daily.  sildenafil citrate (VIAGRA) 50 mg tablet Take 1 Tab by mouth as needed (ED).  tamsulosin (FLOMAX) 0.4 mg capsule TAKE ONE CAPSULE BY MOUTH EVERY EVENING    ramipril (ALTACE) 10 mg capsule Take 1 Cap by mouth nightly.  cyanocobalamin (VITAMIN B12) 1,000 mcg/mL injection INJECT CONTENTS OF ONE VIAL INTRAMUSCULARLY EVERY OTHER WEEK    alpha-D-galactosidase (BEANO PO) Take 1 Tab by mouth two (2) times a day.  cetirizine (ZYRTEC) 10 mg tablet Take 10 mg by mouth nightly.        Social History:  Social History     Tobacco Use    Smoking status: Never Smoker    Smokeless tobacco: Never Used   Substance Use Topics    Alcohol use: Yes     Comment: very rare       Family History:  Family History   Problem Relation Age of Onset    Cancer Father         Breast and Colon    Cancer Mother         LEUKEMIA    No Known Problems Sister     No Known Problems Brother     No Known Problems Sister     Anesth Problems Neg Hx        Review of Systems:    Constitutional: negative fever, negative chills, negative weight loss  Eyes:   negative visual changes  ENT:   negative sore throat, tongue or lip swelling  Respiratory:  negative cough, negative dyspnea  Cards:  negative for chest pain, palpitations, lower extremity edema  GI:   See HPI  :  negative for frequency, dysuria  Integument:  negative for rash and pruritus  Heme:  negative for easy bruising and gum/nose bleeding  Musculoskel: negative for myalgias, back pain and muscle weakness  Neuro: negative for headaches, dizziness, vertigo  Psych:  negative for feelings of anxiety, depression      Objective:     Patient Vitals for the past 8 hrs:   BP Temp Pulse Resp SpO2   12/31/19 0727 154/78 97.7 °F (36.5 °C) 85 16 97 %     No intake/output data recorded. No intake/output data recorded. PHYSICAL EXAM:  General: WD, WN. Alert, cooperative, appears uncomfortable.    HEENT: NC, Atraumatic. PERRLA, EOMI. Anicteric sclerae. Lungs:  CTA Bilaterally. No Wheezing/Rhonchi/Rales. Heart:  Regular rate and rhythm, No murmur, No Rubs, No Gallops  Abdomen: Soft, non-distended, diffuse upper abdominal tenderness.  +Bowel sounds, no HSM  Extremities: No c/c/e  Neurologic:  Alert and oriented X 3. No acute neurological distress. Psych:   Good insight. Not anxious nor agitated. Data Review     Recent Labs     12/31/19  0737   WBC 13.5*   HGB 15.8   HCT 49.8   *     Recent Labs     12/31/19  0737      K 3.6      CO2 28   BUN 19   CREA 0.83   *   CA 9.4     Recent Labs     12/31/19  0737   SGOT 18   *   TP 6.8   ALB 4.1   GLOB 2.7   LPSE >3,000*     No results for input(s): INR, PTP, APTT, INREXT in the last 72 hours. Imaging studies reviewed      Assessment:   1. Recurrent acute pancreatitis - 4th occurrence since whipple procedure, concern for possible stricture contributing to recurrent episodes. CT w/ unchanged PD dilation. Previous triglyceride and IgG4 normal.   2. Hx cholangiocarcinoma - s/p pylorus-preserving whipple   3.  DM     Patient Active Problem List   Diagnosis Code    Obstructive jaundice K83.1    Common bile duct (CBD) obstruction K83.1    UTI (urinary tract infection) N39.0    Cholangiocarcinoma of biliary tract (HCC) C24.9    Cholangiocarcinoma determined by biopsy of biliary tract (HCC) C24.9    Paroxysmal atrial fibrillation (HCC) I48.0    S/P ablation of atrial fibrillation Z98.890, Z86.79    Essential hypertension I10    Cramps, muscle, general R25.2    Low vitamin D level R79.89    Low vitamin B12 level E53.8    Vomiting R11.10    Controlled type 2 diabetes mellitus without complication, without long-term current use of insulin ([facility] E11.9    Acute pancreatitis K85.90    Leukocytosis D72.829    Pancreatitis K85.90              Plan:   -Admit under hospitalist  -Keep NPO  -Supportive management per primary team  -Trend labs  -Consider ERCP once pancreatitis resolves  -Further plans per Dr. Wyman   -Will follow   -Thank you kindly for allowing us to participate in the care of this patient        I have personally reviewed the history and independently examined the patient. I have reviewed the chart and agree with the documentation recorded by the Mid Level Provider, including the assessment, treatment plan, and disposition.       Harshad Oretga MD

## 2019-12-31 NOTE — PROGRESS NOTES
Problem: Pain  Goal: *Control of Pain  Outcome: Progressing Towards Goal  Goal: *PALLIATIVE CARE:  Alleviation of Pain  Outcome: Progressing Towards Goal     Problem: Pain  Goal: *PALLIATIVE CARE:  Alleviation of Pain  Outcome: Progressing Towards Goal     Problem: Patient Education: Go to Patient Education Activity  Goal: Patient/Family Education  Outcome: Progressing Towards Goal     Problem: Falls - Risk of  Goal: *Absence of Falls  Description  Document Krista Becker Fall Risk and appropriate interventions in the flowsheet. Outcome: Progressing Towards Goal  Note:   Fall Risk Interventions  Medication Interventions: Evaluate medications/consider consulting pharmacy     Problem: Patient Education: Go to Patient Education Activity  Goal: Patient/Family Education  Outcome: Progressing Towards Goal     Problem: Pressure Injury - Risk of  Goal: *Prevention of pressure injury  Description  Document Jeet Scale and appropriate interventions in the flowsheet.   Outcome: Progressing Towards Goal  Note:   Pressure Injury Interventions     Problem: Patient Education: Go to Patient Education Activity  Goal: Patient/Family Education  Outcome: Progressing Towards Goal

## 2019-12-31 NOTE — PROGRESS NOTES
.. TRANSFER - IN REPORT:    Verbal report received from [address] (name) on Sushil Shaffer  being received from ED (unit) for routine progression of care      Report consisted of patients Situation, Background, Assessment and   Recommendations(SBAR). Information from the following report(s) SBAR, Kardex and MAR was reviewed with the receiving nurse. Opportunity for questions and clarification was provided. Assessment completed upon patients arrival to unit and care assumed.

## 2019-12-31 NOTE — ED TRIAGE NOTES
Patient arrives ambulatory from home with CC of abdominal pain that worsened last night. Pt reports he has had pancreatitis 3 times in the last three months and was last admitted three weeks ago. Pt reports nausea x3 days.

## 2019-12-31 NOTE — ED NOTES
TRANSFER - OUT REPORT:    Verbal report given to ESVIN Xiong(name) on Cristopher Galvez  being transferred to [facility] (unit) for routine progression of care       Report consisted of patients Situation, Background, Assessment and   Recommendations(SBAR). Information from the following report(s) SBAR, Kardex, ED Summary, Intake/Output, MAR and Recent Results was reviewed with the receiving nurse. Lines:   Peripheral IV 12/31/19 Left Antecubital (Active)   Site Assessment Clean, dry, & intact 12/31/2019  7:37 AM   Phlebitis Assessment 0 12/31/2019  7:37 AM   Infiltration Assessment 0 12/31/2019  7:37 AM   Dressing Status Clean, dry, & intact 12/31/2019  7:37 AM   Dressing Type Transparent 12/31/2019  7:37 AM   Hub Color/Line Status Pink 12/31/2019  7:37 AM   Action Taken Blood drawn 12/31/2019  7:37 AM        Opportunity for questions and clarification was provided.

## 2019-12-31 NOTE — ED PROVIDER NOTES
HPI A 62year old female patient with PMH of cholangiocarcinoma s/p pylorus-preserving Whipple 10/2017, HTN, DM, BPH and recurrent pancreatitis presented to ED for evaluation of abdominal pain since this early morning. Patient was recently discharged on 12/6/19 for evaluation of the same. He states he has had acute pancreatitis 3 times in the last 3 months. Dr. Kahlil Paniagua is his gastroenterologist.Denies fever, cold symptoms, cough, headache, neck pain, visual changes, focal weakness or rash. Denies any difficulty breathing, difficulty swallowing, SOB or chest pain. Reports nausea but denies any vomiting or diarrhea. Pt. Reports that he does not use any alcohol or has not had any new medications. He has not had any food or medications since yesterday. Old charts reviewed. Past Medical History:   Diagnosis Date    Arrhythmia     Previous a.fib, ablation, NSR now; NO LONGER FOLLOWED BY CARDIOLOGIST.     Autoimmune disease ([facility]     SJOGREN'S    Cancer ([facility]     COMMON BILE DUCT, ADENOCARCINOMA    Diabetes ([facility]     Family history of skin cancer     Hypertension     Sepsis ([facility] 2017    STENTING TO BILE DUCT    Status post chemotherapy     Stroke [facility]) 2008    brain aneurysm - no deficits    Sun-damaged skin     Sunburn, blistering        Past Surgical History:   Procedure Laterality Date    ABDOMEN SURGERY PROC UNLISTED  10/2017    Whipple     HX GI      COLONOSCOPY, POLYPS (BENIGN)    HX GI  10/06/2017    [facility]     [address]  [address]    Ablation     HX ORTHOPAEDIC  2000    Spinal fusion W/ HARDWARE    HX OTHER SURGICAL  11/07/2017    [place] [facility], Dr. Robe Vasquez  2017    PORTACATH - then removed    NEUROLOGICAL PROCEDURE UNLISTED  2005    Pina Salida hole washout from cerebral hemorrhage         Family History:   Problem Relation Age of Onset    Cancer Father         Breast and Colon    Cancer Mother         LEUKEMIA    No Known Problems Sister     No Known Problems Brother     No Known Problems Sister     Anesth Problems Neg Hx        Social History     Socioeconomic History    Marital status:      Spouse name: Not on file    Number of children: Not on file    Years of education: Not on file    Highest education level: Not on file   Occupational History    Not on file   Social Needs    Financial resource strain: Not on file    Food insecurity:     Worry: Not on file     Inability: Not on file    Transportation needs:     Medical: Not on file     Non-medical: Not on file   Tobacco Use    Smoking status: Never Smoker    Smokeless tobacco: Never Used   Substance and Sexual Activity    Alcohol use: Yes     Comment: very rare    Drug use: No    Sexual activity: Yes     Partners: Female   Lifestyle    Physical activity:     Days per week: Not on file     Minutes per session: Not on file    Stress: Not on file   Relationships    Social connections:     Talks on phone: Not on file     Gets together: Not on file     Attends Mandaen service: Not on file     Active member of club or organization: Not on file     Attends meetings of clubs or organizations: Not on file     Relationship status: Not on file    Intimate partner violence:     Fear of current or ex partner: Not on file     Emotionally abused: Not on file     Physically abused: Not on file     Forced sexual activity: Not on file   Other Topics Concern    Not on file   Social History Narrative    Not on file         ALLERGIES: Shellfish derived and Pcn [penicillins]    Review of Systems   Constitutional: Positive for appetite change. Negative for activity change, fever and unexpected weight change. HENT: Negative for congestion and trouble swallowing. Eyes: Negative for visual disturbance. Respiratory: Negative for cough and shortness of breath. Cardiovascular: Negative for chest pain, palpitations and leg swelling. Gastrointestinal: Positive for abdominal pain and nausea. Negative for diarrhea and vomiting. Genitourinary: Negative for dysuria and flank pain. Musculoskeletal: Negative for arthralgias and myalgias. Skin: Negative for rash. Neurological: Negative for weakness and headaches. All other systems reviewed and are negative. Vitals:    12/31/19 0727   BP: 154/78   Pulse: 85   Resp: 16   Temp: 97.7 °F (36.5 °C)   SpO2: 97%            Physical Exam  Vitals signs and nursing note reviewed. Exam conducted with a chaperone present. Constitutional:       Appearance: Normal appearance. He is normal weight. He is not ill-appearing, toxic-appearing or diaphoretic. Comments: White male; non smoker,    HENT:      Head: Normocephalic. Right Ear: Tympanic membrane normal.      Left Ear: Tympanic membrane normal.      Nose: Nose normal.      Mouth/Throat:      Mouth: Mucous membranes are moist.      Pharynx: Oropharynx is clear. Neck:      Musculoskeletal: Normal range of motion. Cardiovascular:      Rate and Rhythm: Normal rate and regular rhythm. Pulmonary:      Effort: Pulmonary effort is normal.      Breath sounds: Normal breath sounds. Abdominal:      General: Bowel sounds are normal. There is no distension. Tenderness: There is tenderness. There is no guarding or rebound. Comments: Left mid to lower quadrant tenderness   Musculoskeletal: Normal range of motion. Skin:     General: Skin is warm and dry. Findings: No rash. Neurological:      General: No focal deficit present. Mental Status: He is alert and oriented to person, place, and time. Psychiatric:         Mood and Affect: Mood normal.          [facility]       Procedures      Hospitalist Perfect Serve for Admission  9:14 AM    ED Room Number: R38/R38  Patient Name and age:  Car Yang 62 y.o.  male  Working Diagnosis:   1. Acute pancreatitis, unspecified complication status, unspecified pancreatitis type    2.  Type 2 diabetes mellitus with hyperglycemia, without long-term current use of insulin ([facility]      Readmission: yes  Isolation Requirements:  no  Recommended Level of Care:  med/surg  Code Status:  Full Code  Department:[facility] Adult ED - 21   Other:        Marlys Araya NP with gastreoenterology was consulted; she wants hospitalist to admit and Dr. Sam Scott will see in consult. Discussed plan of care with Dr. Aaron Merrill. Genet Seo NP    9:53 AM  Patient's results and plan of care have been reviewed with him. Patient has verbally conveyed his understanding and agreement of his signs, symptoms, diagnosis, treatment and prognosis and additionally agrees to be admitted.  Genet Seo NP

## 2020-01-01 LAB
ALBUMIN SERPL-MCNC: 2.9 G/DL (ref 3.5–5)
ALBUMIN/GLOB SERPL: 1.3 {RATIO} (ref 1.1–2.2)
ALP SERPL-CCNC: 96 U/L (ref 45–117)
ALT SERPL-CCNC: 29 U/L (ref 12–78)
ANION GAP SERPL CALC-SCNC: 6 MMOL/L (ref 5–15)
AST SERPL-CCNC: 14 U/L (ref 15–37)
BILIRUB SERPL-MCNC: 2.4 MG/DL (ref 0.2–1)
BUN SERPL-MCNC: 17 MG/DL (ref 6–20)
BUN/CREAT SERPL: 24 (ref 12–20)
CALCIUM SERPL-MCNC: 8.4 MG/DL (ref 8.5–10.1)
CHLORIDE SERPL-SCNC: 107 MMOL/L (ref 97–108)
CO2 SERPL-SCNC: 25 MMOL/L (ref 21–32)
CREAT SERPL-MCNC: 0.72 MG/DL (ref 0.7–1.3)
ERYTHROCYTE [DISTWIDTH] IN BLOOD BY AUTOMATED COUNT: 14.4 % (ref 11.5–14.5)
GLOBULIN SER CALC-MCNC: 2.2 G/DL (ref 2–4)
GLUCOSE BLD STRIP.AUTO-MCNC: 109 MG/DL (ref 65–100)
GLUCOSE BLD STRIP.AUTO-MCNC: 129 MG/DL (ref 65–100)
GLUCOSE BLD STRIP.AUTO-MCNC: 204 MG/DL (ref 65–100)
GLUCOSE BLD STRIP.AUTO-MCNC: 69 MG/DL (ref 65–100)
GLUCOSE BLD STRIP.AUTO-MCNC: 79 MG/DL (ref 65–100)
GLUCOSE BLD STRIP.AUTO-MCNC: 89 MG/DL (ref 65–100)
GLUCOSE SERPL-MCNC: 86 MG/DL (ref 65–100)
HCT VFR BLD AUTO: 37.6 % (ref 36.6–50.3)
HEMOCCULT STL QL: NEGATIVE
HGB BLD-MCNC: 12 G/DL (ref 12.1–17)
LIPASE SERPL-CCNC: 2027 U/L (ref 73–393)
MCH RBC QN AUTO: 30 PG (ref 26–34)
MCHC RBC AUTO-ENTMCNC: 31.9 G/DL (ref 30–36.5)
MCV RBC AUTO: 94 FL (ref 80–99)
NRBC # BLD: 0 K/UL (ref 0–0.01)
NRBC BLD-RTO: 0 PER 100 WBC
PLATELET # BLD AUTO: 94 K/UL (ref 150–400)
PMV BLD AUTO: 11 FL (ref 8.9–12.9)
POTASSIUM SERPL-SCNC: 3.6 MMOL/L (ref 3.5–5.1)
PROT SERPL-MCNC: 5.1 G/DL (ref 6.4–8.2)
RBC # BLD AUTO: 4 M/UL (ref 4.1–5.7)
SERVICE CMNT-IMP: ABNORMAL
SERVICE CMNT-IMP: NORMAL
SODIUM SERPL-SCNC: 138 MMOL/L (ref 136–145)
WBC # BLD AUTO: 7 K/UL (ref 4.1–11.1)

## 2020-01-01 PROCEDURE — 82272 OCCULT BLD FECES 1-3 TESTS: CPT

## 2020-01-01 PROCEDURE — 74011250636 HC RX REV CODE- 250/636: Performed by: NURSE PRACTITIONER

## 2020-01-01 PROCEDURE — 83690 ASSAY OF LIPASE: CPT

## 2020-01-01 PROCEDURE — 74011000250 HC RX REV CODE- 250: Performed by: NURSE PRACTITIONER

## 2020-01-01 PROCEDURE — 82962 GLUCOSE BLOOD TEST: CPT

## 2020-01-01 PROCEDURE — 74011000258 HC RX REV CODE- 258: Performed by: NURSE PRACTITIONER

## 2020-01-01 PROCEDURE — 85027 COMPLETE CBC AUTOMATED: CPT

## 2020-01-01 PROCEDURE — 36415 COLL VENOUS BLD VENIPUNCTURE: CPT

## 2020-01-01 PROCEDURE — 74011250637 HC RX REV CODE- 250/637: Performed by: NURSE PRACTITIONER

## 2020-01-01 PROCEDURE — 74011636637 HC RX REV CODE- 636/637: Performed by: FAMILY MEDICINE

## 2020-01-01 PROCEDURE — C9113 INJ PANTOPRAZOLE SODIUM, VIA: HCPCS | Performed by: NURSE PRACTITIONER

## 2020-01-01 PROCEDURE — 80053 COMPREHEN METABOLIC PANEL: CPT

## 2020-01-01 PROCEDURE — 65270000032 HC RM SEMIPRIVATE

## 2020-01-01 PROCEDURE — 74011250636 HC RX REV CODE- 250/636: Performed by: FAMILY MEDICINE

## 2020-01-01 RX ORDER — TAMSULOSIN HYDROCHLORIDE 0.4 MG/1
0.4 CAPSULE ORAL
Status: DISCONTINUED | OUTPATIENT
Start: 2020-01-01 | End: 2020-01-02 | Stop reason: HOSPADM

## 2020-01-01 RX ORDER — POLYETHYLENE GLYCOL 3350 17 G/17G
17 POWDER, FOR SOLUTION ORAL DAILY PRN
Status: DISCONTINUED | OUTPATIENT
Start: 2020-01-01 | End: 2020-01-02 | Stop reason: HOSPADM

## 2020-01-01 RX ORDER — DEXTROSE MONOHYDRATE AND SODIUM CHLORIDE 5; .9 G/100ML; G/100ML
75 INJECTION, SOLUTION INTRAVENOUS CONTINUOUS
Status: DISCONTINUED | OUTPATIENT
Start: 2020-01-01 | End: 2020-01-02 | Stop reason: HOSPADM

## 2020-01-01 RX ADMIN — SODIUM CHLORIDE 40 MG: 9 INJECTION INTRAMUSCULAR; INTRAVENOUS; SUBCUTANEOUS at 16:44

## 2020-01-01 RX ADMIN — HYDROMORPHONE HYDROCHLORIDE 0.5 MG: 1 INJECTION, SOLUTION INTRAMUSCULAR; INTRAVENOUS; SUBCUTANEOUS at 05:44

## 2020-01-01 RX ADMIN — ONDANSETRON 4 MG: 2 INJECTION INTRAMUSCULAR; INTRAVENOUS at 00:32

## 2020-01-01 RX ADMIN — HYDROMORPHONE HYDROCHLORIDE 0.5 MG: 1 INJECTION, SOLUTION INTRAMUSCULAR; INTRAVENOUS; SUBCUTANEOUS at 00:33

## 2020-01-01 RX ADMIN — ONDANSETRON 4 MG: 2 INJECTION INTRAMUSCULAR; INTRAVENOUS at 05:44

## 2020-01-01 RX ADMIN — HYDROMORPHONE HYDROCHLORIDE 0.5 MG: 1 INJECTION, SOLUTION INTRAMUSCULAR; INTRAVENOUS; SUBCUTANEOUS at 22:01

## 2020-01-01 RX ADMIN — INSULIN LISPRO 3 UNITS: 100 INJECTION, SOLUTION INTRAVENOUS; SUBCUTANEOUS at 17:37

## 2020-01-01 RX ADMIN — DEXTROSE MONOHYDRATE AND SODIUM CHLORIDE 75 ML/HR: 5; .9 INJECTION, SOLUTION INTRAVENOUS at 15:26

## 2020-01-01 RX ADMIN — TAMSULOSIN HYDROCHLORIDE 0.4 MG: 0.4 CAPSULE ORAL at 22:00

## 2020-01-01 RX ADMIN — ONDANSETRON 4 MG: 2 INJECTION INTRAMUSCULAR; INTRAVENOUS at 22:00

## 2020-01-01 RX ADMIN — Medication 10 ML: at 22:01

## 2020-01-01 RX ADMIN — Medication 10 ML: at 05:44

## 2020-01-01 NOTE — PROGRESS NOTES
HYPOGLYCEMIC EPISODE DOCUMENTATION    Patient with hypoglycemic episode(s) at 0702 (time) on 01/01/2020(date). BG value(s) pre-treatment 71  Was patient symptomatic?  [] yes, [x] no  Patient was treated with the following rescue medications/treatments: [] D50                [] Glucose tablets                [] Glucagon                [x] 4oz juice                [] 6oz reg soda                                                                    [] 8oz low fat milk  BG value post-treatment: 89  Once BG treated and value greater than 80mg/dl, pt was provided with the following:  [] snack  [x] meal  Name of MD notified:n/a   The following orders were received: none

## 2020-01-01 NOTE — CONSULTS
Chief Complaint:  Acute pancreatitis    HPI:  61 yo man with hx cholangiocarcinoma s/p pylorus sparing WHIPPLE who was consulted for recurrent pancreatitis. Pt had WHIPPLE performed in 2017 by Dr. Aden Ghotra. Pt reported intermittent pancreatitis and bile reflux since operation. He had gastrojejunostomy anastomotic dilatation by Dr. Yelena Streeter and reported some improvement. He does have recurrent pancreatitis and is discussing with Dr. Yelena Streeter about pancreatic ductal dilatation with serial stent upsiding. Pt has appointment with Dr. Aden Ghotra as outpatient to discuss possible G-J and P-J revision. Past Medical History:   Diagnosis Date    Arrhythmia     Previous a.fib, ablation, NSR now; NO LONGER FOLLOWED BY CARDIOLOGIST.  Autoimmune disease ([facility]     SJOGREN'S    Cancer ([facility]     COMMON BILE DUCT, ADENOCARCINOMA    Diabetes ([address]     Family history of skin cancer     Hypertension     Sepsis ([facility] [facility] 2017    STENTING TO BILE DUCT    Status post chemotherapy     Stroke [facility]) 2008    brain aneurysm - no deficits    Sun-damaged skin     Sunburn, blistering        Past Surgical History:   Procedure Laterality Date    ABDOMEN SURGERY PROC UNLISTED  10/2017    Whipple     HX GI      COLONOSCOPY, POLYPS (BENIGN)    HX GI  10/06/2017    Whipple Dr[facility]hotra [facility]     [address]  2005    Ablation     HX ORTHOPAEDIC  2000    Spinal fusion W/ HARDWARE    HX OTHER SURGICAL  11/07/2017    [place] Cath, Dr. Benjamin Juan  2017    PORTACATH - then removed    NEUROLOGICAL PROCEDURE UNLISTED  2005    Othelia Rasher hole washout from cerebral hemorrhage       No current facility-administered medications on file prior to encounter. Current Outpatient Medications on File Prior to Encounter   Medication Sig Dispense Refill    metFORMIN ER (GLUCOPHAGE XR) 500 mg tablet Take 1 Tab by mouth daily. 180 Tab 3    sucralfate (CARAFATE) 1 gram tablet Take 1 g by mouth four (4) times daily.       omeprazole (PRILOSEC) 40 mg capsule Take 1 Cap by mouth daily. 30 Cap 0    empagliflozin (JARDIANCE) 25 mg tablet Take 1 Tab by mouth daily. 90 Tab 3    tamsulosin (FLOMAX) 0.4 mg capsule TAKE ONE CAPSULE BY MOUTH EVERY EVENING  0    ramipril (ALTACE) 10 mg capsule Take 1 Cap by mouth nightly. 90 Cap 3    cyanocobalamin (VITAMIN B12) 1,000 mcg/mL injection INJECT CONTENTS OF ONE VIAL INTRAMUSCULARLY EVERY OTHER WEEK 6 mL 6    alpha-D-galactosidase (BEANO PO) Take 1 Tab by mouth two (2) times a day.  cetirizine (ZYRTEC) 10 mg tablet Take 10 mg by mouth nightly.  traMADol (ULTRAM) 50 mg tablet Take 50 mg by mouth every six (6) hours as needed for Pain.  ondansetron (ZOFRAN ODT) 4 mg disintegrating tablet Take 1 Tab by mouth every eight (8) hours as needed for Nausea. 20 Tab 0    sildenafil citrate (VIAGRA) 50 mg tablet Take 1 Tab by mouth as needed (ED).  30 Tab 1       Allergies   Allergen Reactions    Shellfish Derived Anaphylaxis     Soft shell crabs    Morphine Nausea and Vomiting    Pcn [Penicillins] Other (comments)     Pt stated \"always been told PCN\"  Pt tolerated other cephalosporins in the past       Review of Systems - General ROS: negative  Psychological ROS: negative  Respiratory ROS: negative  Cardiovascular ROS: negative  Gastrointestinal ROS: positive for - abdominal pain and nausea/vomiting    Visit Vitals  /70   Pulse 70   Temp 98.1 °F (36.7 °C)   Resp 18   SpO2 96%         Physical Exam:    Gen:  NAD  Pulm:  Unlabored  Abd:  S/ND/mild TTP in epigastric area      Recent Results (from the past 24 hour(s))   GLUCOSE, POC    Collection Time: 12/31/19  9:25 PM   Result Value Ref Range    Glucose (POC) 94 65 - 100 mg/dL    Performed by ANETTE FERNANDES    CBC W/O DIFF    Collection Time: 01/01/20  5:48 AM   Result Value Ref Range    WBC 7.0 4.1 - 11.1 K/uL    RBC 4.00 (L) 4.10 - 5.70 M/uL    HGB 12.0 (L) 12.1 - 17.0 g/dL    HCT 37.6 36.6 - 50.3 %    MCV 94.0 80.0 - 99.0 FL    MCH 30.0 26.0 - 34.0 PG    MCHC 31.9 30.0 - 36.5 g/dL    RDW 14.4 11.5 - 14.5 %    PLATELET 94 (L) 175 - 400 K/uL    MPV 11.0 8.9 - 12.9 FL    NRBC 0.0 0  WBC    ABSOLUTE NRBC 0.00 0.00 - 7.18 K/uL   METABOLIC PANEL, COMPREHENSIVE    Collection Time: 01/01/20  5:48 AM   Result Value Ref Range    Sodium 138 136 - 145 mmol/L    Potassium 3.6 3.5 - 5.1 mmol/L    Chloride 107 97 - 108 mmol/L    CO2 25 21 - 32 mmol/L    Anion gap 6 5 - 15 mmol/L    Glucose 86 65 - 100 mg/dL    BUN 17 6 - 20 MG/DL    Creatinine 0.72 0.70 - 1.30 MG/DL    BUN/Creatinine ratio 24 (H) 12 - 20      GFR est AA >60 >60 ml/min/1.73m2    GFR est non-AA >60 >60 ml/min/1.73m2    Calcium 8.4 (L) 8.5 - 10.1 MG/DL    Bilirubin, total 2.4 (H) 0.2 - 1.0 MG/DL    ALT (SGPT) 29 12 - 78 U/L    AST (SGOT) 14 (L) 15 - 37 U/L    Alk. phosphatase 96 45 - 117 U/L    Protein, total 5.1 (L) 6.4 - 8.2 g/dL    Albumin 2.9 (L) 3.5 - 5.0 g/dL    Globulin 2.2 2.0 - 4.0 g/dL    A-G Ratio 1.3 1.1 - 2.2     LIPASE    Collection Time: 01/01/20  5:48 AM   Result Value Ref Range    Lipase 2,027 (H) 73 - 393 U/L   GLUCOSE, POC    Collection Time: 01/01/20  7:02 AM   Result Value Ref Range    Glucose (POC) 69 65 - 100 mg/dL    Performed by Auxmoney Blodgett, POC    Collection Time: 01/01/20  7:43 AM   Result Value Ref Range    Glucose (POC) 89 65 - 100 mg/dL    Performed by [facility], POC    Collection Time: 01/01/20 11:25 AM   Result Value Ref Range    Glucose (POC) 79 65 - 100 mg/dL    Performed by [facility] ([facility])    GLUCOSE, POC    Collection Time: 01/01/20 11:52 AM   Result Value Ref Range    Glucose (POC) 129 (H) 65 - 100 mg/dL    Performed by Fernando Hamilton (PCT)        AP:  61 yo man with acute pancreatitis    - Acute pancreatitis:  No acute indication for operation.   Patient is being assess by GI for PD stent placement  - Clears and monitor labs  - Possible revision with Dr. Peña Cea once pancreatitis resolves Breath sounds clear and equal bilaterally.

## 2020-01-01 NOTE — PROGRESS NOTES
[address], [address] [place] [address]    GI PROGRESS NOTE    NAME: Edison Ibarra   :  1955   MRN:  [id number]       Subjective:     Feels better, no pain, tolerating clear liquids  Review of Systems    Constitutional: negative fever, negative chills, negative weight loss  Eyes:   negative visual changes  ENT:   negative sore throat, tongue or lip swelling  Respiratory:  negative cough, negative dyspnea  Cards:  negative for chest pain, palpitations, lower extremity edema  GI:   See HPI  :  negative for frequency, dysuria  Integument:  negative for rash and pruritus  Heme:  negative for easy bruising and gum/nose bleeding  Musculoskel: negative for myalgias,  back pain and muscle weakness  Neuro: negative for headaches, dizziness, vertigo  Psych:  negative for feelings of anxiety, depression         Objective:     VITALS:   Last 24hrs VS reviewed since prior progress note. Most recent are:  Visit Vitals  /70   Pulse 70   Temp 98.1 °F (36.7 °C)   Resp 18   SpO2 96%       Intake/Output Summary (Last 24 hours) at 2020 1656  Last data filed at 2020 1528  Gross per 24 hour   Intake 922.2 ml   Output    Net 922.2 ml     PHYSICAL EXAM:  General: WD, WN. Alert, cooperative, no acute distress    HEENT: NC, Atraumatic. PERRLA, EOMI. Anicteric sclerae. Lungs:  CTA Bilaterally. No Wheezing/Rhonchi/Rales. Heart:  Regular  rhythm,  No murmur (), No Rubs, No Gallops  Abdomen: Soft, Non distended, Non tender.  +Bowel sounds, no HSM  Extremities: No c/c/e  Neurologic:  CN 2-12 gi, Alert and oriented X 3. No acute neurological distress   Psych:   Good insight. Not anxious nor agitated.     Lab Data Reviewed:   Recent Labs     20  0548 19  0737   WBC 7.0 13.5*   HGB 12.0* 15.8   HCT 37.6 49.8   PLT 94* 123*     Recent Labs     20  0548 19  0737    141   K 3.6 3.6    106   CO2 25 28   BUN 17 19   CREA 0.72 0.83   GLU 86 143*   CA 8.4* 9.4     Recent Labs     01/01/20  0548 12/31/19  0737   SGOT 14* 18   AP 96 143*   TP 5.1* 6.8   ALB 2.9* 4.1   GLOB 2.2 2.7   LPSE 2,027* >3,000*       ________________________________________________________________________       Assessment:   · Recurrent acute pancreatitis, improving     Patient Active Problem List   Diagnosis Code    Obstructive jaundice K83.1    Common bile duct (CBD) obstruction K83.1    UTI (urinary tract infection) N39.0    Cholangiocarcinoma of biliary tract (HCC) C24.9    Cholangiocarcinoma determined by biopsy of biliary tract (HCC) C24.9    Paroxysmal atrial fibrillation (HCC) I48.0    S/P ablation of atrial fibrillation Z98.890, Z86.79    Essential hypertension I10    Cramps, muscle, general R25.2    Low vitamin D level R79.89    Low vitamin B12 level E53.8    Vomiting R11.10    Controlled type 2 diabetes mellitus without complication, without long-term current use of insulin (HCC) E11.9    Acute pancreatitis K85.90    Leukocytosis D72.829    Pancreatitis K85.90    Abdominal pain R10.9     Plan:   · Monitor lipase  · Discussed with Dr. Zo Shah  · Dr. oJse L Tee will follow in am and decide on timing of ERP     Signed By: Mone Torres MD     1/1/2020  4:56 PM

## 2020-01-01 NOTE — PROGRESS NOTES
Problem: Falls - Risk of  Goal: *Absence of Falls  Description  Document Antionette Johnson Fall Risk and appropriate interventions in the flowsheet. Outcome: Progressing Towards Goal  Note:   Fall Risk Interventions:   Call bell within patient reach. Side rails up x 2. Personal belongings within patient reach. Bed in lowest position. All wheels locked.     Medication Interventions: Evaluate medications/consider consulting pharmacy

## 2020-01-01 NOTE — PROGRESS NOTES
Hospitalist Progress Note          Belkis Suggs NP  Please call  and page for questions. Call physician on-call through the  7pm-7am    Daily Progress Note: 1/1/2020    Primary care provider:Narendra Raya DO    Date of admission: 12/31/2019  7:40 AM    Admission Summary and Hospital Course:    From H&P 12/31/19:  Yue Gray is a 62 y.o. male who presents with recurrent abdominal pain. 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 This bout of abdominal pain started earlythis  morning. Patient was recently discharged on 12/6/19 for evaluation/treatment of the same.  He states he has had acute pancreatitis 3 times in the last 3 months.  Dr. May Board his gastroenterologist. Denies fever, cold symptoms, cough, headache, neck pain, visual changes, focal weakness or rash. Denies any difficulty breathing, difficulty swallowing, SOB or chest pain. Reports nausea but denies any vomiting or diarrhea. Pt. Reports that he does not use any alcohol or has not had any new medications.  He has not had any food or medications since yesterday. \"    CT Abd/Pelv 12/31/19: IMPRESSION:  1. Ongoing peripancreatic inflammation which has increased in interval  consistent with acute pancreatitis. No definite necrosis or pseudocyst formation  is present. 2.  Status post Whipple    GI Consult 12/31/19:  \"Plan:  -Admit under hospitalist  -Keep NPO  -Supportive management per primary team  -Trend labs  -Consider ERCP once pancreatitis resolves  -Further plans per Dr. Gayathri Baez  -Will follow   -Thank you kindly for allowing us to participate in the care of this patient\"          Subjective:   Pt seen today in NAD. Reports little nausea. Improved pain. Only tender to palpation on left abd now. Stool this AM \"a little loose\" per patient but not watery. Patient reports likely ERCP tomorrow.  Discussed hypoglycemic event this AM, resolved with OJ. Will add dextrose to IVF as he will need to be NPO after MN for possible ERCP tomorrow. Assessment/Plan:     Acute and recurrent pancreatitis  -Lipase downtrending slightly; no vomiting today, slight nausea and stool loose this AM  -Clear liq diet, NPO after MN for possible ERCP  -Add dextrose to IVFluids  -pain control  -Appreciate GI input     HTN  -low/normal  -hold home ramapril for now    DM  -Hypoglycemic episode this AM  -hold PO home meds, accuchecks and ss insulin  -Add dextrose to IVF    BPH  -flomax    GERD  -omprazole      See orders for other plans. Diet: clears, NPO after MN  VTE prophylaxis:    Code status: FULL  Discussed plan of care with: Patient/Family and Nurse. Pre-admission: lived at home. Discharge planning: pending. Needed for Discharge: pending    Emergency Contact(s):  Extended Emergency Contact Information  Primary Emergency Contact: Christelle Cristina  [place] Phone: [phone]  Relation: Spouse  Secondary Emergency Contact: Roberto Cristina   [address] Phone: [phone]  Relation: Child          Review of Systems:     Review of Systems:  Symptom  Y/N  Comments   Symptom  Y/N  Comments    Fever/Chills   N   Chest Pain  N    Poor Appetite   N   Edema   N    Cough  N   Abdominal Pain   Y    Sputum  N   Joint Pain  N    SOB/VIVEROS  N   Pruritis/Rash  N    Nausea/vomit  Y Slight nausea  Tolerating PT/OT  Y    Diarrhea  Y Loose this AM  Tolerating Diet  Y    Constipation  N   Other      Could not obtain due to:         Objective:   Physical Exam:     Visit Vitals  /70   Pulse 70   Temp 98.1 °F (36.7 °C)   Resp 18   SpO2 96%      O2 Device: Room air    Temp (24hrs), Av.1 °F (36.7 °C), Min:97.8 °F (36.6 °C), Max:98.5 °F (36.9 °C)    701 - 1900  In: 200 [P.O.:200]  Out: -    1901 - 700  In: 5148 [P.O.:200; I.V.:903]  Out: -       General:  Alert, cooperative, no distress, appears stated age. Lungs:   Clear to auscultation bilaterally. Heart:  Regular rate and rhythm, S1, S2 normal, no murmur, click, rub or gallop. Abdomen:   Soft, slightly tender Left, non-distended. Bowel sounds normal.    Extremities: Extremities normal, atraumatic, no cyanosis or edema. Skin: Skin color, texture, turgor normal. No rashes or lesions   Neurologic: CNII-XII intact. Data Review:       Recent Days:  Recent Labs     01/01/20  0548 12/31/19  0737   WBC 7.0 13.5*   HGB 12.0* 15.8   HCT 37.6 49.8   PLT 94* 123*     Recent Labs     01/01/20  0548 12/31/19  0737    141   K 3.6 3.6    106   CO2 25 28   GLU 86 143*   BUN 17 19   CREA 0.72 0.83   CA 8.4* 9.4   MG  --  2.4   ALB 2.9* 4.1   SGOT 14* 18   ALT 29 41     No results for input(s): PH, PCO2, PO2, HCO3, FIO2 in the last 72 hours.     24 Hour Results:  Recent Results (from the past 24 hour(s))   GLUCOSE, POC    Collection Time: 12/31/19  4:17 PM   Result Value Ref Range    Glucose (POC) 108 (H) 65 - 100 mg/dL    Performed by Harmeet Hanson, POC    Collection Time: 12/31/19  9:25 PM   Result Value Ref Range    Glucose (POC) 94 65 - 100 mg/dL    Performed by ANETTE FERNANDES    CBC W/O DIFF    Collection Time: 01/01/20  5:48 AM   Result Value Ref Range    WBC 7.0 4.1 - 11.1 K/uL    RBC 4.00 (L) 4.10 - 5.70 M/uL    HGB 12.0 (L) 12.1 - 17.0 g/dL    HCT 37.6 36.6 - 50.3 %    MCV 94.0 80.0 - 99.0 FL    MCH 30.0 26.0 - 34.0 PG    MCHC 31.9 30.0 - 36.5 g/dL    RDW 14.4 11.5 - 14.5 %    PLATELET 94 (L) 754 - 400 K/uL    MPV 11.0 8.9 - 12.9 FL    NRBC 0.0 0  WBC    ABSOLUTE NRBC 0.00 0.00 - 6.62 K/uL   METABOLIC PANEL, COMPREHENSIVE    Collection Time: 01/01/20  5:48 AM   Result Value Ref Range    Sodium 138 136 - 145 mmol/L    Potassium 3.6 3.5 - 5.1 mmol/L    Chloride 107 97 - 108 mmol/L    CO2 25 21 - 32 mmol/L    Anion gap 6 5 - 15 mmol/L    Glucose 86 65 - 100 mg/dL    BUN 17 6 - 20 MG/DL    Creatinine 0.72 0.70 - 1.30 MG/DL BUN/Creatinine ratio 24 (H) 12 - 20      GFR est AA >60 >60 ml/min/1.73m2    GFR est non-AA >60 >60 ml/min/1.73m2    Calcium 8.4 (L) 8.5 - 10.1 MG/DL    Bilirubin, total 2.4 (H) 0.2 - 1.0 MG/DL    ALT (SGPT) 29 12 - 78 U/L    AST (SGOT) 14 (L) 15 - 37 U/L    Alk.  phosphatase 96 45 - 117 U/L    Protein, total 5.1 (L) 6.4 - 8.2 g/dL    Albumin 2.9 (L) 3.5 - 5.0 g/dL    Globulin 2.2 2.0 - 4.0 g/dL    A-G Ratio 1.3 1.1 - 2.2     LIPASE    Collection Time: 01/01/20  5:48 AM   Result Value Ref Range    Lipase 2,027 (H) 73 - 393 U/L   GLUCOSE, POC    Collection Time: 01/01/20  7:02 AM   Result Value Ref Range    Glucose (POC) 69 65 - 100 mg/dL    Performed by [employer], POC    Collection Time: 01/01/20  7:43 AM   Result Value Ref Range    Glucose (POC) 89 65 - 100 mg/dL    Performed by [employer] [place], POC    Collection Time: 01/01/20 11:25 AM   Result Value Ref Range    Glucose (POC) 79 65 - 100 mg/dL    Performed by [facility] (PCT)    GLUCOSE, POC    Collection Time: 01/01/20 11:52 AM   Result Value Ref Range    Glucose (POC) 129 (H) 65 - 100 mg/dL    Performed by [facility] (PCT)        Problem List:  Problem List as of 1/1/2020 Date Reviewed: 12/2/2019          Codes Class Noted - Resolved    Abdominal pain ICD-10-CM: R10.9  ICD-9-CM: 789.00  12/31/2019 - Present        Pancreatitis ICD-10-CM: K85.90  ICD-9-CM: 266.4  11/17/2019 - Present        Acute pancreatitis ICD-10-CM: K85.90  ICD-9-CM: 653.8  8/16/2018 - Present        Leukocytosis ICD-10-CM: T23.289  ICD-9-CM: 288.60  8/16/2018 - Present        Controlled type 2 diabetes mellitus without complication, without long-term current use of insulin ([facility] (Chronic) ICD-10-CM: E11.9  ICD-9-CM: 250.00  7/30/2018 - Present        Vomiting ICD-10-CM: R11.10  ICD-9-CM: 787.03  7/5/2018 - Present        Paroxysmal atrial fibrillation (HCC) (Chronic) ICD-10-CM: I48.0  ICD-9-CM: 427.31  10/26/2017 - Present        S/P ablation of atrial fibrillation (Chronic) ICD-10-CM: F26.975, Z86.79  ICD-9-CM: V45.89  10/26/2017 - Present        Essential hypertension (Chronic) ICD-10-CM: I10  ICD-9-CM: 401.9  10/26/2017 - Present        Cramps, muscle, general ICD-10-CM: R25.2  ICD-9-CM: 729.82  10/26/2017 - Present        Low vitamin D level (Chronic) ICD-10-CM: R79.89  ICD-9-CM: 790.6  10/26/2017 - Present        Low vitamin B12 level (Chronic) ICD-10-CM: E53.8  ICD-9-CM: 266.2  10/26/2017 - Present        Cholangiocarcinoma determined by biopsy of biliary tract (HCC) ICD-10-CM: C24.9  ICD-9-CM: 156.9  10/6/2017 - Present        Cholangiocarcinoma of biliary tract (HCC) ICD-10-CM: C24.9  ICD-9-CM: 156.9  9/11/2017 - Present        Common bile duct (CBD) obstruction ICD-10-CM: K83.1  ICD-9-CM: 576.2  7/25/2017 - Present        UTI (urinary tract infection) ICD-10-CM: N39.0  ICD-9-CM: 599.0  7/25/2017 - Present        Obstructive jaundice ICD-10-CM: K83.1  ICD-9-CM: 576.2  7/23/2017 - Present        RESOLVED: Pancreatitis ICD-10-CM: K85.90  ICD-9-CM: 577.0  8/31/2018 - 8/31/2018        RESOLVED: Sjogren's disease ([facility] (Chronic) ICD-10-CM: M35.00  ICD-9-CM: 710.2  10/26/2017 - 12/2/2019        RESOLVED: Sepsis ([facility] ICD-10-CM: A41.9  ICD-9-CM: 038.9, 995.91  8/25/2017 - 8/31/2017              Medications reviewed  Current Facility-Administered Medications   Medication Dose Route Frequency    dextrose 5% and 0.9% NaCl infusion  75 mL/hr IntraVENous CONTINUOUS    polyethylene glycol (MIRALAX) packet 17 g  17 g Oral DAILY PRN    sodium chloride (NS) flush 5-40 mL  5-40 mL IntraVENous Q8H    sodium chloride (NS) flush 5-40 mL  5-40 mL IntraVENous PRN    acetaminophen (TYLENOL) tablet 650 mg  650 mg Oral Q4H PRN    oxyCODONE-acetaminophen (PERCOCET) 5-325 mg per tablet 1 Tab  1 Tab Oral Q4H PRN    ondansetron (ZOFRAN) injection 4 mg  4 mg IntraVENous Q4H PRN    glucose chewable tablet 16 g  4 Tab Oral PRN    glucagon (GLUCAGEN) injection 1 mg  1 mg IntraMUSCular PRN    dextrose 10% infusion 0-250 mL  0-250 mL IntraVENous PRN    insulin lispro (HUMALOG) injection   SubCUTAneous TIDAC    HYDROmorphone (PF) (DILAUDID) injection 0.5 mg  0.5 mg IntraVENous Q3H PRN       Care Plan discussed with: Patient/Family, Nurse and Other Dr. Jose Alejandro Harper  Total time spent with patient: 30 minutes.     Ivy Domínguez NP  Hospitalist  Providers can reach me directly at [phone] or [facility]

## 2020-01-01 NOTE — PROGRESS NOTES
Problem: Pain  Goal: *Control of Pain  Outcome: Progressing Towards Goal     Problem: Falls - Risk of  Goal: *Absence of Falls  Description  Document Linda Eugenio Fall Risk and appropriate interventions in the flowsheet. Outcome: Progressing Towards Goal  Note:   Fall Risk Interventions:  Medication Interventions: Evaluate medications/consider consulting pharmacy, Patient to call before getting OOB  Gripper socks on feet, patient verbalizes udnerstanding to call for assistance     Problem: Pressure Injury - Risk of  Goal: *Prevention of pressure injury  Description  Document Jeet Scale and appropriate interventions in the flowsheet.   Outcome: Progressing Towards Goal  Note:   Pressure Injury Interventions:  Nutrition Interventions: Document food/fluid/supplement intake

## 2020-01-01 NOTE — H&P
[address] [facility] Adult  Hospitalist Group  History and Physical    Primary Care Provider: Andreia Paniagua DO    Subjective:     Bairon Daniel is a 62 y.o. male who presents with recurrent abdominal pain. 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 This bout of abdominal pain started earlythis  morning. Patient was recently discharged on 12/6/19 for evaluation/treatment of the same. He states he has had acute pancreatitis 3 times in the last 3 months. Dr. Kahlil Paniagua is his gastroenterologist. Denies fever, cold symptoms, cough, headache, neck pain, visual changes, focal weakness or rash. Denies any difficulty breathing, difficulty swallowing, SOB or chest pain. Reports nausea but denies any vomiting or diarrhea. Pt. Reports that he does not use any alcohol or has not had any new medications. He has not had any food or medications since yesterday. Review of Systems:    A comprehensive review of systems was negative except for that written in the History of Present Illness. Past Medical History:   Diagnosis Date    Arrhythmia     Previous a.fib, ablation, NSR now; NO LONGER FOLLOWED BY CARDIOLOGIST.     Autoimmune disease ([address]     SJOGREN'S    Cancer ([address]     COMMON BILE DUCT, ADENOCARCINOMA    Diabetes ([address]     Family history of skin cancer     Hypertension     Sepsis [facility] 2017    STENTING TO BILE DUCT    Status post chemotherapy     Stroke [facility]) 2008    brain aneurysm - no deficits    Sun-damaged skin     Sunburn, blistering       Past Surgical History:   Procedure Laterality Date    ABDOMEN SURGERY PROC UNLISTED  10/2017    Whipple     HX GI      COLONOSCOPY, POLYPS (BENIGN)    HX GI  10/06/2017    Viktor Nix [facility]     [facility] [address]  2005    Ablation     HX ORTHOPAEDIC  2000    Spinal fusion W/ HARDWARE    HX OTHER SURGICAL  11/07/2017    [place] Cath, Dr. Domonique Gutierrez ACCESS  2017    PORTACATH - then removed    NEUROLOGICAL PROCEDURE UNLISTED  2005    [place] hole washout from cerebral hemorrhage     Prior to Admission medications    Medication Sig Start Date End Date Taking? Authorizing Provider   metFORMIN ER (GLUCOPHAGE XR) 500 mg tablet Take 1 Tab by mouth daily. 11/20/19  Yes Hannah Scales MD   sucralfate (CARAFATE) 1 gram tablet Take 1 g by mouth four (4) times daily. Yes Provider, Historical   omeprazole (PRILOSEC) 40 mg capsule Take 1 Cap by mouth daily. 11/5/19  Yes Christa Mar MD   empagliflozin (JARDIANCE) 25 mg tablet Take 1 Tab by mouth daily. 5/30/19  Yes Gennaro Yang MD   tamsulosin (FLOMAX) 0.4 mg capsule TAKE ONE CAPSULE BY MOUTH EVERY EVENING 2/28/19  Yes Provider, Historical   ramipril (ALTACE) 10 mg capsule Take 1 Cap by mouth nightly. 1/4/19  Yes Silas Raya III, DO   cyanocobalamin (VITAMIN B12) 1,000 mcg/mL injection INJECT CONTENTS OF ONE VIAL INTRAMUSCULARLY EVERY OTHER WEEK 9/20/18  Yes Silas Raya III, DO   alpha-D-galactosidase (BEANO PO) Take 1 Tab by mouth two (2) times a day. Yes Other, MD Flynn   cetirizine (ZYRTEC) 10 mg tablet Take 10 mg by mouth nightly. Yes Provider, Historical   traMADol (ULTRAM) 50 mg tablet Take 50 mg by mouth every six (6) hours as needed for Pain. Provider, Historical   ondansetron (ZOFRAN ODT) 4 mg disintegrating tablet Take 1 Tab by mouth every eight (8) hours as needed for Nausea.  11/7/19   Lion Norris MD   sildenafil citrate (VIAGRA) 50 mg tablet Take 1 Tab by mouth as needed (ED). 5/6/19   Byron OGDEN III, DO     Allergies   Allergen Reactions    Shellfish Derived Anaphylaxis     Soft shell crabs    Pcn [Penicillins] Other (comments)     Pt stated \"always been told PCN\"  Pt tolerated other cephalosporins in the past      Family History   Problem Relation Age of Onset    Cancer Father         Breast and Colon    Cancer Mother         LEUKEMIA    No Known Problems Sister     No Known Problems Brother     No Known Problems Sister     Anesth Problems Neg Hx         SOCIAL HISTORY:  Patient resides at Home with his wife. Patient ambulates independently   Smoking history: non smoker  Alcohol history: no etoh use        Objective:       Physical Exam:   Visit Vitals  /59 (BP 1 Location: Right arm, BP Patient Position: At rest)   Pulse 85   Temp 97.8 °F (36.6 °C)   Resp 18   SpO2 95%     General:  Alert, cooperative, no distress, appears stated age. Head:  Normocephalic, without obvious abnormality, atraumatic. Eyes:  Conjunctivae/corneas clear. PERRL, EOMs intact. Fundi benign   Throat: Lips, mucosa, and tongue normal. Teeth and gums normal.   Neck: Supple, symmetrical, trachea midline, no adenopathy, thyroid: no enlargement/tenderness/nodules, no carotid bruit and no JVD. Back:   Symmetric, no curvature. ROM normal. No CVA tenderness. Lungs:   Clear to auscultation bilaterally. Chest wall:  No tenderness or deformity. Heart:  Regular rate and rhythm, S1, S2 normal, no murmur, . Abdomen:   Soft, mild-tenderness. Mild tenderness   Extremities: Extremities normal, atraumatic, no cyanosis or edema. Pulses: 2+ and symmetric all extremities. Skin: Skin color, texture, turgor normal. No rashes or lesions   Lymph nodes: Cervical, supraclavicular, and axillary nodes normal.   Neurologic: CNII-XII intact. Normal strength, sensation and reflexes throughout. Data Review: All diagnostic labs and studies have been reviewed. Recent Results (from the past 24 hour(s))   SAMPLES BEING HELD    Collection Time: 12/31/19  7:37 AM   Result Value Ref Range    SAMPLES BEING HELD 1 RED, 1 LORENE     COMMENT        Add-on orders for these samples will be processed based on acceptable specimen integrity and analyte stability, which may vary by analyte.    METABOLIC PANEL, COMPREHENSIVE    Collection Time: 12/31/19  7:37 AM   Result Value Ref Range    Sodium 141 136 - 145 mmol/L    Potassium 3.6 3.5 - 5.1 mmol/L    Chloride 106 97 - 108 mmol/L    CO2 28 21 - 32 mmol/L    Anion gap 7 5 - 15 mmol/L    Glucose 143 (H) 65 - 100 mg/dL    BUN 19 6 - 20 MG/DL    Creatinine 0.83 0.70 - 1.30 MG/DL    BUN/Creatinine ratio 23 (H) 12 - 20      GFR est AA >60 >60 ml/min/1.73m2    GFR est non-AA >60 >60 ml/min/1.73m2    Calcium 9.4 8.5 - 10.1 MG/DL    Bilirubin, total 1.6 (H) 0.2 - 1.0 MG/DL    ALT (SGPT) 41 12 - 78 U/L    AST (SGOT) 18 15 - 37 U/L    Alk. phosphatase 143 (H) 45 - 117 U/L    Protein, total 6.8 6.4 - 8.2 g/dL    Albumin 4.1 3.5 - 5.0 g/dL    Globulin 2.7 2.0 - 4.0 g/dL    A-G Ratio 1.5 1.1 - 2.2     CBC WITH AUTOMATED DIFF    Collection Time: 12/31/19  7:37 AM   Result Value Ref Range    WBC 13.5 (H) 4.1 - 11.1 K/uL    RBC 5.34 4.10 - 5.70 M/uL    HGB 15.8 12.1 - 17.0 g/dL    HCT 49.8 36.6 - 50.3 %    MCV 93.3 80.0 - 99.0 FL    MCH 29.6 26.0 - 34.0 PG    MCHC 31.7 30.0 - 36.5 g/dL    RDW 14.6 (H) 11.5 - 14.5 %    PLATELET 516 (L) 240 - 400 K/uL    MPV 11.5 8.9 - 12.9 FL    NRBC 0.0 0  WBC    ABSOLUTE NRBC 0.00 0.00 - 0.01 K/uL    NEUTROPHILS 85 (H) 32 - 75 %    LYMPHOCYTES 7 (L) 12 - 49 %    MONOCYTES 6 5 - 13 %    EOSINOPHILS 1 0 - 7 %    BASOPHILS 0 0 - 1 %    IMMATURE GRANULOCYTES 1 (H) 0.0 - 0.5 %    ABS. NEUTROPHILS 11.5 (H) 1.8 - 8.0 K/UL    ABS. LYMPHOCYTES 0.9 0.8 - 3.5 K/UL    ABS. MONOCYTES 0.8 0.0 - 1.0 K/UL    ABS. EOSINOPHILS 0.2 0.0 - 0.4 K/UL    ABS. BASOPHILS 0.1 0.0 - 0.1 K/UL    ABS. IMM.  GRANS. 0.1 (H) 0.00 - 0.04 K/UL    DF AUTOMATED     LIPASE    Collection Time: 12/31/19  7:37 AM   Result Value Ref Range    Lipase >3,000 (H) 73 - 393 U/L   MAGNESIUM    Collection Time: 12/31/19  7:37 AM   Result Value Ref Range    Magnesium 2.4 1.6 - 2.4 mg/dL   POC LACTIC ACID    Collection Time: 12/31/19  7:43 AM   Result Value Ref Range    Lactic Acid (POC) 0.98 0.40 - 2.00 mmol/L   URINALYSIS W/ RFLX MICROSCOPIC    Collection Time: 12/31/19  8:12 AM   Result Value Ref Range    Color YELLOW/STRAW      Appearance CLEAR CLEAR      Specific gravity 1.015      pH (UA) 8.0 5.0 - 8.0      Protein NEGATIVE  NEG mg/dL    Glucose >1,000 (A) NEG mg/dL    Ketone 15 (A) NEG mg/dL    Bilirubin NEGATIVE  NEG      Blood NEGATIVE  NEG      Urobilinogen 1.0 0.2 - 1.0 EU/dL    Nitrites NEGATIVE  NEG      Leukocyte Esterase NEGATIVE  NEG     URINE CULTURE HOLD SAMPLE    Collection Time: 12/31/19  8:12 AM   Result Value Ref Range    Urine culture hold        URINE ON HOLD IN MICROBIOLOGY DEPT FOR 3 DAYS. IF UNPRESERVED URINE IS SUBMITTED, IT CANNOT BE USED FOR ADDITIONAL TESTING AFTER 24 HRS, RECOLLECTION WILL BE REQUIRED. GLUCOSE, POC    Collection Time: 12/31/19 11:08 AM   Result Value Ref Range    Glucose (POC) 124 (H) 65 - 100 mg/dL    Performed by [address] [place] [address], POC    Collection Time: 12/31/19  4:17 PM   Result Value Ref Range    Glucose (POC) 108 (H) 65 - 100 mg/dL    Performed by [address] [place] [address], POC    Collection Time: 12/31/19  9:25 PM   Result Value Ref Range    Glucose (POC) 94 65 - 100 mg/dL    Performed by Kang Holbrook        Assessment:     Active Problems:    Acute pancreatitis (8/16/2018)      Abdominal pain (12/31/2019)        Plan:     1. Acute and recurrent pancreatitis  Clear liq diet, IVFluids, pain control with IV meds, IV antiemetics  Consult GI- probable plans for ERCP    2. CV- HTN- resume ramapril  3. DM- hold PO home meds, accuchecks and ss insulin  4. BPH- flomax  5. GERD- omprazole  6.  Full Code    Signed By: Harpreet Harris MD     December 31, 2019

## 2020-01-01 NOTE — PROGRESS NOTES
..Bedside shift change report given to Timmy Burch RN (oncoming nurse) by Chuy Redmond RN (offgoing nurse). Report included the following information SBAR, Kardex and MAR.

## 2020-01-02 VITALS
DIASTOLIC BLOOD PRESSURE: 76 MMHG | HEART RATE: 78 BPM | OXYGEN SATURATION: 99 % | TEMPERATURE: 97.4 F | RESPIRATION RATE: 17 BRPM | SYSTOLIC BLOOD PRESSURE: 122 MMHG

## 2020-01-02 LAB
ALBUMIN SERPL-MCNC: 3 G/DL (ref 3.5–5)
ALBUMIN/GLOB SERPL: 1.1 {RATIO} (ref 1.1–2.2)
ALP SERPL-CCNC: 106 U/L (ref 45–117)
ALT SERPL-CCNC: 28 U/L (ref 12–78)
ANION GAP SERPL CALC-SCNC: 6 MMOL/L (ref 5–15)
AST SERPL-CCNC: 12 U/L (ref 15–37)
BILIRUB SERPL-MCNC: 1.7 MG/DL (ref 0.2–1)
BUN SERPL-MCNC: 8 MG/DL (ref 6–20)
BUN/CREAT SERPL: 11 (ref 12–20)
CALCIUM SERPL-MCNC: 8.5 MG/DL (ref 8.5–10.1)
CHLORIDE SERPL-SCNC: 111 MMOL/L (ref 97–108)
CO2 SERPL-SCNC: 25 MMOL/L (ref 21–32)
CREAT SERPL-MCNC: 0.76 MG/DL (ref 0.7–1.3)
ERYTHROCYTE [DISTWIDTH] IN BLOOD BY AUTOMATED COUNT: 14 % (ref 11.5–14.5)
FERRITIN SERPL-MCNC: 122 NG/ML (ref 26–388)
GLOBULIN SER CALC-MCNC: 2.7 G/DL (ref 2–4)
GLUCOSE BLD STRIP.AUTO-MCNC: 120 MG/DL (ref 65–100)
GLUCOSE BLD STRIP.AUTO-MCNC: 124 MG/DL (ref 65–100)
GLUCOSE SERPL-MCNC: 123 MG/DL (ref 65–100)
HCT VFR BLD AUTO: 40.7 % (ref 36.6–50.3)
HGB BLD-MCNC: 13.4 G/DL (ref 12.1–17)
IRON SATN MFR SERPL: 13 % (ref 20–50)
IRON SERPL-MCNC: 31 UG/DL (ref 35–150)
LIPASE SERPL-CCNC: 816 U/L (ref 73–393)
MCH RBC QN AUTO: 29.9 PG (ref 26–34)
MCHC RBC AUTO-ENTMCNC: 32.9 G/DL (ref 30–36.5)
MCV RBC AUTO: 90.8 FL (ref 80–99)
NRBC # BLD: 0 K/UL (ref 0–0.01)
NRBC BLD-RTO: 0 PER 100 WBC
PLATELET # BLD AUTO: 113 K/UL (ref 150–400)
PMV BLD AUTO: 11.4 FL (ref 8.9–12.9)
POTASSIUM SERPL-SCNC: 3.4 MMOL/L (ref 3.5–5.1)
PROT SERPL-MCNC: 5.7 G/DL (ref 6.4–8.2)
RBC # BLD AUTO: 4.48 M/UL (ref 4.1–5.7)
SERVICE CMNT-IMP: ABNORMAL
SERVICE CMNT-IMP: ABNORMAL
SODIUM SERPL-SCNC: 142 MMOL/L (ref 136–145)
TIBC SERPL-MCNC: 245 UG/DL (ref 250–450)
WBC # BLD AUTO: 6.1 K/UL (ref 4.1–11.1)

## 2020-01-02 PROCEDURE — 74011000258 HC RX REV CODE- 258: Performed by: NURSE PRACTITIONER

## 2020-01-02 PROCEDURE — 83690 ASSAY OF LIPASE: CPT

## 2020-01-02 PROCEDURE — 83540 ASSAY OF IRON: CPT

## 2020-01-02 PROCEDURE — 74011250637 HC RX REV CODE- 250/637: Performed by: NURSE PRACTITIONER

## 2020-01-02 PROCEDURE — 82962 GLUCOSE BLOOD TEST: CPT

## 2020-01-02 PROCEDURE — 85027 COMPLETE CBC AUTOMATED: CPT

## 2020-01-02 PROCEDURE — 36415 COLL VENOUS BLD VENIPUNCTURE: CPT

## 2020-01-02 PROCEDURE — 82728 ASSAY OF FERRITIN: CPT

## 2020-01-02 PROCEDURE — 80053 COMPREHEN METABOLIC PANEL: CPT

## 2020-01-02 RX ORDER — ACETAMINOPHEN 325 MG/1
650 TABLET ORAL
Qty: 1 TAB | Refills: 0 | Status: SHIPPED | OUTPATIENT
Start: 2020-01-02 | End: 2022-01-01

## 2020-01-02 RX ORDER — OXYCODONE AND ACETAMINOPHEN 5; 325 MG/1; MG/1
1 TABLET ORAL
Qty: 16 TAB | Refills: 0 | Status: SHIPPED | OUTPATIENT
Start: 2020-01-02 | End: 2020-01-06

## 2020-01-02 RX ORDER — ONDANSETRON 4 MG/1
4 TABLET, ORALLY DISINTEGRATING ORAL
Qty: 10 TAB | Refills: 0 | Status: SHIPPED | OUTPATIENT
Start: 2020-01-02 | End: 2020-01-09 | Stop reason: SDUPTHER

## 2020-01-02 RX ORDER — POTASSIUM CHLORIDE 750 MG/1
40 TABLET, FILM COATED, EXTENDED RELEASE ORAL
Status: COMPLETED | OUTPATIENT
Start: 2020-01-02 | End: 2020-01-02

## 2020-01-02 RX ADMIN — Medication 10 ML: at 13:07

## 2020-01-02 RX ADMIN — Medication 10 ML: at 06:29

## 2020-01-02 RX ADMIN — POTASSIUM CHLORIDE 40 MEQ: 750 TABLET, FILM COATED, EXTENDED RELEASE ORAL at 13:07

## 2020-01-02 RX ADMIN — DEXTROSE MONOHYDRATE AND SODIUM CHLORIDE 75 ML/HR: 5; .9 INJECTION, SOLUTION INTRAVENOUS at 06:28

## 2020-01-02 NOTE — DISCHARGE SUMMARY
Discharge Summary       PATIENT ID: Gem Nuno  MRN: [id number]   YOB: 1955    DATE OF ADMISSION: 12/31/2019  7:40 AM    DATE OF DISCHARGE: 01/02/2019  PRIMARY CARE PROVIDER: Gagandeep Jiménez DO     ATTENDING PHYSICIAN: Dr. [facility]  DISCHARGING PROVIDER: Arabella Atkins NP    To contact this individual call [phone] and ask the  to page. If unavailable ask to be transferred the Adult Hospitalist Department. CONSULTATIONS: IP CONSULT TO GASTROENTEROLOGY  IP CONSULT TO HOSPITALIST  IP CONSULT TO GENERAL SURGERY    PROCEDURES/SURGERIES: * No surgery found *    ADMITTING DIAGNOSES & HOSPITAL COURSE:   From H&P 12/31/19:  \"Ridge Cristina is a 62 y. o. male who presents with recurrent abdominal pain.  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 This bout of abdominal pain started earlythis  morning. Patient was recently discharged on 12/6/19 for evaluation/treatment of the same.  He states he has had acute pancreatitis 3 times in the last 3 months.  Dr. Janelle Hamilton his gastroenterologist. Denies fever, cold symptoms, cough, headache, neck pain, visual changes, focal weakness or rash. Denies any difficulty breathing, difficulty swallowing, SOB or chest pain. Reports nausea but denies any vomiting or diarrhea. Pt. Reports that he does not use any alcohol or has not had any new medications.  He has not had any food or medications since yesterday. \"     CT Abd/Pelv 12/31/19: IMPRESSION:  1.  Ongoing peripancreatic inflammation which has increased in interval  consistent with acute pancreatitis. No definite necrosis or pseudocyst formation  is present.   2.  Status post Whipple     GI Consult 12/31/19:  \"Plan:  -Admit under hospitalist  -Keep NPO  -Supportive management per primary team  -Trend labs  -Consider ERCP once pancreatitis resolves  -Further plans per Dr. Mely Bennett  -Will follow   -Thank you kindly for allowing us to participate in the care of this patient\"                Surgery Consult 1/1/2020:  \"AP:  63 yo man with acute pancreatitis  - Acute pancreatitis:  No acute indication for operation. Patient is being assess by GI for PD stent placement  - Clears and monitor labs  - Possible revision with Dr. Deven Bland once pancreatitis resolves\"     Lipase now under 900. Per GI, unable to schedule ERCP until Monday and if patient tolerated full liquids for lunch, they are ok with him discharging home with planned readmission for Monday for ERCP. Patient tolerated lunch without difficulty. Abd pain well controlled. No further nausea or vomiting. Patient agreeable to discharge. Discussed discharge recommendations, instructions and follow up. All questions answered. DISCHARGE DIAGNOSES / PLAN:      Acute and recurrent pancreatitis  - Significantly improved  -Tolerating full liq diet, NPO after MN Sunday for possible ERCP on Monday  -Advance diet as tolerated to cardiac  -PRN tylenol, percocet, zofran Rxs given  -Call GI office today or tomorrow for further instructions on ERCP for Monday     HTN  -Stable  -Cont home ramapril     DM  -Stable  -Resume home regimen     BPH  -Cont flomax     GERD  -Cont omprazole        ADDITIONAL CARE RECOMMENDATIONS:   Call Dr. Elida Leon today or tomorrow for further instructions on returning for your ERCP on Monday. Nothing to eat or drink after midnight Sunday night in preparation for your ERCP on Monday.      PENDING TEST RESULTS:   At the time of discharge the following test results are still pending: NA    FOLLOW UP APPOINTMENTS:    Follow-up Information     Follow up With Specialties Details Why Contact Edwin Anthony, DO Internal Medicine  As needed hospital follow up [id number] [facility]      Diane Andrade MD Gastroenterology, Gastroenterology Call today For instructions on returning Monday for ERCP [address],[facility] Floor  [phone]             DIET: Full liquids as tolerated. Advance as tolerated to diabetic diet. ACTIVITY: As tolerated    WOUND CARE: NA    EQUIPMENT needed: NA      DISCHARGE MEDICATIONS:  Current Discharge Medication List      START taking these medications    Details   acetaminophen (TYLENOL) 325 mg tablet Take 2 Tabs by mouth every four (4) hours as needed for Pain or Fever (Over the counter medication). Qty: 1 Tab, Refills: 0    Comments: Over the counter medication      oxyCODONE-acetaminophen (PERCOCET) 5-325 mg per tablet Take 1 Tab by mouth every six (6) hours as needed for Pain for up to 4 days. Max Daily Amount: 4 Tabs. Qty: 16 Tab, Refills: 0    Associated Diagnoses: Acute pancreatitis, unspecified complication status, unspecified pancreatitis type      !! ondansetron (ZOFRAN ODT) 4 mg disintegrating tablet Take 1 Tab by mouth every six (6) hours as needed for Nausea. Qty: 10 Tab, Refills: 0       !! - Potential duplicate medications found. Please discuss with provider. CONTINUE these medications which have NOT CHANGED    Details   metFORMIN ER (GLUCOPHAGE XR) 500 mg tablet Take 1 Tab by mouth daily. Qty: 180 Tab, Refills: 3      sucralfate (CARAFATE) 1 gram tablet Take 1 g by mouth four (4) times daily. omeprazole (PRILOSEC) 40 mg capsule Take 1 Cap by mouth daily. Qty: 30 Cap, Refills: 0      empagliflozin (JARDIANCE) 25 mg tablet Take 1 Tab by mouth daily. Qty: 90 Tab, Refills: 3    Associated Diagnoses: Type 2 diabetes mellitus without complication, without long-term current use of insulin ([facility])      tamsulosin (FLOMAX) 0.4 mg capsule TAKE ONE CAPSULE BY MOUTH EVERY EVENING  Refills: 0      ramipril (ALTACE) 10 mg capsule Take 1 Cap by mouth nightly.   Qty: 90 Cap, Refills: 3      cyanocobalamin (VITAMIN B12) 1,000 mcg/mL injection INJECT CONTENTS OF ONE VIAL INTRAMUSCULARLY EVERY OTHER WEEK  Qty: 6 mL, Refills: 6 alpha-D-galactosidase (BEANO PO) Take 1 Tab by mouth two (2) times a day. cetirizine (ZYRTEC) 10 mg tablet Take 10 mg by mouth nightly. traMADol (ULTRAM) 50 mg tablet Take 50 mg by mouth every six (6) hours as needed for Pain. !! ondansetron (ZOFRAN ODT) 4 mg disintegrating tablet Take 1 Tab by mouth every eight (8) hours as needed for Nausea. Qty: 20 Tab, Refills: 0      sildenafil citrate (VIAGRA) 50 mg tablet Take 1 Tab by mouth as needed (ED). Qty: 30 Tab, Refills: 1       !! - Potential duplicate medications found. Please discuss with provider. NOTIFY YOUR PHYSICIAN FOR ANY OF THE FOLLOWING:   Fever over 101 degrees for 24 hours. Chest pain, shortness of breath, fever, chills, nausea, vomiting, diarrhea, change in mentation, falling, weakness, bleeding. Severe pain or pain not relieved by medications. Or, any other signs or symptoms that you may have questions about.     DISPOSITION:  X  Home With:   OT  PT  HH  RN       Long term SNF/Inpatient Rehab    Independent/assisted living    Hospice    Other:       PATIENT CONDITION AT DISCHARGE:     Functional status    Poor     Deconditioned    X Independent      Cognition    X Lucid     Forgetful     Dementia      Catheters/lines (plus indication)    Vega     PICC     PEG     None      Code status   XX  Full code     DNR      PHYSICAL EXAMINATION AT DISCHARGE:  See progress note    /76 (BP 1 Location: Right arm, BP Patient Position: At rest)   Pulse 78   Temp 97.4 °F (36.3 °C)   Resp 17   SpO2 99%       CHRONIC MEDICAL DIAGNOSES:  Problem List as of 1/2/2020 Date Reviewed: 12/2/2019          Codes Class Noted - Resolved    Abdominal pain ICD-10-CM: R10.9  ICD-9-CM: 789.00  12/31/2019 - Present        Pancreatitis ICD-10-CM: K85.90  ICD-9-CM: 577.0  11/17/2019 - Present        Acute pancreatitis ICD-10-CM: K85.90  ICD-9-CM: 284.5  8/16/2018 - Present        Leukocytosis ICD-10-CM: A66.457  ICD-9-CM: 288.60  8/16/2018 - Present        Controlled type 2 diabetes mellitus without complication, without long-term current use of insulin (HCC) (Chronic) ICD-10-CM: E11.9  ICD-9-CM: 250.00  7/30/2018 - Present        Vomiting ICD-10-CM: R11.10  ICD-9-CM: 787.03  7/5/2018 - Present        Paroxysmal atrial fibrillation (HCC) (Chronic) ICD-10-CM: I48.0  ICD-9-CM: 427.31  10/26/2017 - Present        S/P ablation of atrial fibrillation (Chronic) ICD-10-CM: Q99.284, Z86.79  ICD-9-CM: V45.89  10/26/2017 - Present        Essential hypertension (Chronic) ICD-10-CM: I10  ICD-9-CM: 401.9  10/26/2017 - Present        Cramps, muscle, general ICD-10-CM: R25.2  ICD-9-CM: 729.82  10/26/2017 - Present        Low vitamin D level (Chronic) ICD-10-CM: R79.89  ICD-9-CM: 790.6  10/26/2017 - Present        Low vitamin B12 level (Chronic) ICD-10-CM: E53.8  ICD-9-CM: 266.2  10/26/2017 - Present        Cholangiocarcinoma determined by biopsy of biliary tract ([facility] ICD-10-CM: C24.9  ICD-9-CM: 156.9  10/6/2017 - Present        Cholangiocarcinoma of biliary tract ([facility] ICD-10-CM: C24.9  ICD-9-CM: 156.9  9/11/2017 - Present        Common bile duct (CBD) obstruction ICD-10-CM: K83.1  ICD-9-CM: 576.2  7/25/2017 - Present        UTI (urinary tract infection) ICD-10-CM: N39.0  ICD-9-CM: 599.0  7/25/2017 - Present        Obstructive jaundice ICD-10-CM: K83.1  ICD-9-CM: 576.2  7/23/2017 - Present        RESOLVED: Pancreatitis ICD-10-CM: K85.90  ICD-9-CM: 577.0  8/31/2018 - 8/31/2018        RESOLVED: Sjogren's disease ([facility] (Chronic) ICD-10-CM: M35.00  ICD-9-CM: 710.2  10/26/2017 - 12/2/2019        RESOLVED: Sepsis ([facility] ICD-10-CM: A41.9  ICD-9-CM: 038.9, 995.91  8/25/2017 - 8/31/2017              Greater than 30 minutes were spent with the patient on counseling and coordination of care    Signed:   Kiran Felder NP  1/2/2020  2:39 PM

## 2020-01-02 NOTE — PROGRESS NOTES
[address]  [address]  [address] [place] [address]   [phone]                GI PROGRESS NOTE  Tejal Flores, [facility]  Work Cell: [phone]      NAME:   Jaden Zabala   :    1955   MRN:    [id number]     Assessment/Plan   1. Recurrent acute pancreatitis - 4th occurrence since whipple procedure, concern for possible stricture contributing to recurrent episodes. CT w/ unchanged PD dilation. Previous triglyceride and IgG4 normal.   - Advance to full liquids   - Supportive management per primary team  - Plan for ERCP Monday (2020)     2. Hx cholangiocarcinoma - s/p pylorus-preserving whipple   3. DM     If tolerates diet, okay discharge from GI standpoint to follow-up outpatient on Monday for ERCP. Patient Active Problem List   Diagnosis Code    Obstructive jaundice K83.1    Common bile duct (CBD) obstruction K83.1    UTI (urinary tract infection) N39.0    Cholangiocarcinoma of biliary tract (HCC) C24.9    Cholangiocarcinoma determined by biopsy of biliary tract (HCC) C24.9    Paroxysmal atrial fibrillation (HCC) I48.0    S/P ablation of atrial fibrillation Z98.890, Z86.79    Essential hypertension I10    Cramps, muscle, general R25.2    Low vitamin D level R79.89    Low vitamin B12 level E53.8    Vomiting R11.10    Controlled type 2 diabetes mellitus without complication, without long-term current use of insulin (HCC) E11.9    Acute pancreatitis K85.90    Leukocytosis D72.829    Pancreatitis K85.90    Abdominal pain R10.9       Subjective:     Denies any complaints. LFTs and lipase improving. Objective:     VITALS:   Last 24hrs VS reviewed since prior hospitalist progress note.  Most recent are:  Visit Vitals  /85 (BP 1 Location: Right arm, BP Patient Position: At rest)   Pulse 79   Temp 97.5 °F (36.4 °C)   Resp 17   SpO2 96%       Intake/Output Summary (Last 24 hours) at 2020 0939  Last data filed at 2020 3981  Gross per 24 hour Intake 2208.2 ml   Output    Net 2208.2 ml        PHYSICAL EXAM:  General   well developed, alert, in no acute distress  EENT  Normocephalic, Atraumatic, PERRLA, EOMI, sclera clear  Respiratory   Clear To Auscultation bilaterally - no wheezes, rales, rhonchi, or crackles  Cardiology  Regular Rate and Rythmn  - no murmurs, rubs or gallops  Abdominal  Soft, non-tender, non-distended, positive bowel sounds, no hepatosplenomegaly, no palpable mass  Neurological  No focal neurological deficits noted  Psychological  Oriented x 3. Normal affect. Lab Data   Recent Results (from the past 12 hour(s))   GLUCOSE, POC    Collection Time: 01/01/20 10:04 PM   Result Value Ref Range    Glucose (POC) 109 (H) 65 - 100 mg/dL    Performed by Harper Frias    CBC W/O DIFF    Collection Time: 01/02/20  3:24 AM   Result Value Ref Range    WBC 6.1 4.1 - 11.1 K/uL    RBC 4.48 4.10 - 5.70 M/uL    HGB 13.4 12.1 - 17.0 g/dL    HCT 40.7 36.6 - 50.3 %    MCV 90.8 80.0 - 99.0 FL    MCH 29.9 26.0 - 34.0 PG    MCHC 32.9 30.0 - 36.5 g/dL    RDW 14.0 11.5 - 14.5 %    PLATELET 568 (L) 674 - 400 K/uL    MPV 11.4 8.9 - 12.9 FL    NRBC 0.0 0  WBC    ABSOLUTE NRBC 0.00 0.00 - 5.53 K/uL   METABOLIC PANEL, COMPREHENSIVE    Collection Time: 01/02/20  3:24 AM   Result Value Ref Range    Sodium 142 136 - 145 mmol/L    Potassium 3.4 (L) 3.5 - 5.1 mmol/L    Chloride 111 (H) 97 - 108 mmol/L    CO2 25 21 - 32 mmol/L    Anion gap 6 5 - 15 mmol/L    Glucose 123 (H) 65 - 100 mg/dL    BUN 8 6 - 20 MG/DL    Creatinine 0.76 0.70 - 1.30 MG/DL    BUN/Creatinine ratio 11 (L) 12 - 20      GFR est AA >60 >60 ml/min/1.73m2    GFR est non-AA >60 >60 ml/min/1.73m2    Calcium 8.5 8.5 - 10.1 MG/DL    Bilirubin, total 1.7 (H) 0.2 - 1.0 MG/DL    ALT (SGPT) 28 12 - 78 U/L    AST (SGOT) 12 (L) 15 - 37 U/L    Alk.  phosphatase 106 45 - 117 U/L    Protein, total 5.7 (L) 6.4 - 8.2 g/dL    Albumin 3.0 (L) 3.5 - 5.0 g/dL    Globulin 2.7 2.0 - 4.0 g/dL    A-G Ratio 1.1 1.1 - 2.2     LIPASE    Collection Time: 01/02/20  3:24 AM   Result Value Ref Range    Lipase 816 (H) 73 - 393 U/L   IRON PROFILE    Collection Time: 01/02/20  3:24 AM   Result Value Ref Range    Iron 31 (L) 35 - 150 ug/dL    TIBC 245 (L) 250 - 450 ug/dL    Iron % saturation 13 (L) 20 - 50 %   FERRITIN    Collection Time: 01/02/20  3:24 AM   Result Value Ref Range    Ferritin 122 26 - 388 NG/ML   GLUCOSE, POC    Collection Time: 01/02/20  6:48 AM   Result Value Ref Range    Glucose (POC) 124 (H) 65 - 100 mg/dL    Performed by Nilam Aguilar          Medications: Reviewed    PMH/SH reviewed - no change compared to H&P  Mid-Level Provider: Jasbir Alvarado NP   Date/Time:  1/2/2020

## 2020-01-02 NOTE — PROGRESS NOTES
Hospitalist Progress Note          Aditi Holguin NP  Please call  and page for questions. Call physician on-call through the  7pm-7am    Daily Progress Note: 1/2/2020    Primary care provider:Narendra Raya DO    Date of admission: 12/31/2019  7:40 AM    Admission Summary and Hospital Course:    From H&P 12/31/19:  Alex Nunn is a 62 y.o. male who presents with recurrent abdominal pain. 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 This bout of abdominal pain started earlythis  morning. Patient was recently discharged on 12/6/19 for evaluation/treatment of the same.  He states he has had acute pancreatitis 3 times in the last 3 months.  Dr. Sung Oliver his gastroenterologist. Denies fever, cold symptoms, cough, headache, neck pain, visual changes, focal weakness or rash. Denies any difficulty breathing, difficulty swallowing, SOB or chest pain. Reports nausea but denies any vomiting or diarrhea. Pt. Reports that he does not use any alcohol or has not had any new medications.  He has not had any food or medications since yesterday. \"    CT Abd/Pelv 12/31/19: IMPRESSION:  1. Ongoing peripancreatic inflammation which has increased in interval  consistent with acute pancreatitis. No definite necrosis or pseudocyst formation  is present. 2.  Status post Whipple    GI Consult 12/31/19:  \"Plan:  -Admit under hospitalist  -Keep NPO  -Supportive management per primary team  -Trend labs  -Consider ERCP once pancreatitis resolves  -Further plans per Dr. Elida Leon  -Will follow   -Thank you kindly for allowing us to participate in the care of this patient\"          Subjective:   Patient seen in bed this morning in no acute distress. Reports much improved abdominal pain. No nausea vomiting or diarrhea today.   Per patient, he believes the plan is for ERCP in the morning and then discharge later tomorrow afternoon. Blood sugars better overnight with addition of dextrose to fluids. Tolerating clear liquids. Assessment/Plan:     Acute and recurrent pancreatitis  -Improved  -Tolerating Clear liq diet, NPO after MN for possible ERCP in AM  -Cont dextrose to IVFluids  -pain control  -Appreciate GI input     HTN  -Stable  -Restart home ramapril    DM  -Improved/stable  -hold PO home meds, accuchecks and ss insulin  -Cont dextrose to IVF    BPH  -flomax    GERD  -omprazole      See orders for other plans. Diet: clears, NPO after MN  VTE prophylaxis:    Code status: FULL  Discussed plan of care with: Patient/Family and Nurse. Pre-admission: lived at home. Discharge planning: pending. Needed for Discharge: pending    Emergency Contact(s):  Extended Emergency Contact Information  Primary Emergency Contact: AkhilclovisChristelle  [place] Phone: [phone]  Relation: Spouse  Secondary Emergency Contact: Roberto Cristina   [address] [employer] [employer] Phone: [phone]  Relation: Child          Review of Systems:     Review of Systems:  Symptom  Y/N  Comments   Symptom  Y/N  Comments    Fever/Chills   N   Chest Pain  N    Poor Appetite   N   Edema   N    Cough  N   Abdominal Pain   Y IMPROVED   Sputum  N   Joint Pain  N    SOB/VIVEROS  N   Pruritis/Rash  N    Nausea/vomit  N   Tolerating PT/OT  Y    Diarrhea  N   Tolerating Diet  Y    Constipation  N   Other      Could not obtain due to:         Objective:   Physical Exam:     Visit Vitals  /85 (BP 1 Location: Right arm, BP Patient Position: At rest)   Pulse 79   Temp 97.5 °F (36.4 °C)   Resp 17   SpO2 96%      O2 Device: Room air    Temp (24hrs), Av.8 °F (36.6 °C), Min:97.5 °F (36.4 °C), Max:98 °F (36.7 °C)    No intake/output data recorded.  1901 -  0700  In: 2208.2 [P.O.:200; I.V.:2008.2]  Out: -       General:  Alert, cooperative, no distress, appears stated age. Lungs:   Clear to auscultation bilaterally.    Heart:  Regular rate and rhythm, S1, S2 normal, no murmur, click, rub or gallop. Abdomen:   Soft, slightly tender Left-improved, non-distended. Bowel sounds normal.    Extremities: Extremities normal, atraumatic, no cyanosis or edema. Skin: Skin color, texture, turgor normal. No rashes or lesions   Neurologic: CNII-XII intact. Data Review:       Recent Days:  Recent Labs     01/02/20  0324 01/01/20  0548 12/31/19  0737   WBC 6.1 7.0 13.5*   HGB 13.4 12.0* 15.8   HCT 40.7 37.6 49.8   * 94* 123*     Recent Labs     01/02/20  0324 01/01/20  0548 12/31/19  0737    138 141   K 3.4* 3.6 3.6   * 107 106   CO2 25 25 28   * 86 143*   BUN 8 17 19   CREA 0.76 0.72 0.83   CA 8.5 8.4* 9.4   MG  --   --  2.4   ALB 3.0* 2.9* 4.1   SGOT 12* 14* 18   ALT 28 29 41     No results for input(s): PH, PCO2, PO2, HCO3, FIO2 in the last 72 hours.     24 Hour Results:  Recent Results (from the past 24 hour(s))   GLUCOSE, POC    Collection Time: 01/01/20 11:25 AM   Result Value Ref Range    Glucose (POC) 79 65 - 100 mg/dL    Performed by Caren Ene (PCT)    GLUCOSE, POC    Collection Time: 01/01/20 11:52 AM   Result Value Ref Range    Glucose (POC) 129 (H) 65 - 100 mg/dL    Performed by Caren Ene (PCT)    GLUCOSE, POC    Collection Time: 01/01/20  5:09 PM   Result Value Ref Range    Glucose (POC) 204 (H) 65 - 100 mg/dL    Performed by [address] [place] [address],[address], STOOL    Collection Time: 01/01/20  5:39 PM   Result Value Ref Range    Occult blood, stool NEGATIVE  NEG     GLUCOSE, POC    Collection Time: 01/01/20 10:04 PM   Result Value Ref Range    Glucose (POC) 109 (H) 65 - 100 mg/dL    Performed by Sandi Phillips    CBC W/O DIFF    Collection Time: 01/02/20  3:24 AM   Result Value Ref Range    WBC 6.1 4.1 - 11.1 K/uL    RBC 4.48 4.10 - 5.70 M/uL    HGB 13.4 12.1 - 17.0 g/dL    HCT 40.7 36.6 - 50.3 %    MCV 90.8 80.0 - 99.0 FL    MCH 29.9 26.0 - 34.0 PG    MCHC 32.9 30.0 - 36.5 g/dL    RDW 14.0 11.5 - 14.5 %    PLATELET 746 (L) 150 - 400 K/uL    MPV 11.4 8.9 - 12.9 FL    NRBC 0.0 0  WBC    ABSOLUTE NRBC 0.00 0.00 - 9.63 K/uL   METABOLIC PANEL, COMPREHENSIVE    Collection Time: 01/02/20  3:24 AM   Result Value Ref Range    Sodium 142 136 - 145 mmol/L    Potassium 3.4 (L) 3.5 - 5.1 mmol/L    Chloride 111 (H) 97 - 108 mmol/L    CO2 25 21 - 32 mmol/L    Anion gap 6 5 - 15 mmol/L    Glucose 123 (H) 65 - 100 mg/dL    BUN 8 6 - 20 MG/DL    Creatinine 0.76 0.70 - 1.30 MG/DL    BUN/Creatinine ratio 11 (L) 12 - 20      GFR est AA >60 >60 ml/min/1.73m2    GFR est non-AA >60 >60 ml/min/1.73m2    Calcium 8.5 8.5 - 10.1 MG/DL    Bilirubin, total 1.7 (H) 0.2 - 1.0 MG/DL    ALT (SGPT) 28 12 - 78 U/L    AST (SGOT) 12 (L) 15 - 37 U/L    Alk.  phosphatase 106 45 - 117 U/L    Protein, total 5.7 (L) 6.4 - 8.2 g/dL    Albumin 3.0 (L) 3.5 - 5.0 g/dL    Globulin 2.7 2.0 - 4.0 g/dL    A-G Ratio 1.1 1.1 - 2.2     LIPASE    Collection Time: 01/02/20  3:24 AM   Result Value Ref Range    Lipase 816 (H) 73 - 393 U/L   IRON PROFILE    Collection Time: 01/02/20  3:24 AM   Result Value Ref Range    Iron 31 (L) 35 - 150 ug/dL    TIBC 245 (L) 250 - 450 ug/dL    Iron % saturation 13 (L) 20 - 50 %   FERRITIN    Collection Time: 01/02/20  3:24 AM   Result Value Ref Range    Ferritin 122 26 - 388 NG/ML   GLUCOSE, POC    Collection Time: 01/02/20  6:48 AM   Result Value Ref Range    Glucose (POC) 124 (H) 65 - 100 mg/dL    Performed by [employer]        Problem List:  Problem List as of 1/2/2020 Date Reviewed: 12/2/2019          Codes Class Noted - Resolved    Abdominal pain ICD-10-CM: R10.9  ICD-9-CM: 789.00  12/31/2019 - Present        Pancreatitis ICD-10-CM: K85.90  ICD-9-CM: 577.0  11/17/2019 - Present        Acute pancreatitis ICD-10-CM: K85.90  ICD-9-CM: 577.0  8/16/2018 - Present        Leukocytosis ICD-10-CM: D72.829  ICD-9-CM: 288.60  8/16/2018 - Present        Controlled type 2 diabetes mellitus without complication, without long-term current use of insulin [facility]) (Chronic) ICD-10-CM: E11.9  ICD-9-CM: 250.00  7/30/2018 - Present        Vomiting ICD-10-CM: R11.10  ICD-9-CM: 787.03  7/5/2018 - Present        Paroxysmal atrial fibrillation (HCC) (Chronic) ICD-10-CM: I48.0  ICD-9-CM: 427.31  10/26/2017 - Present        S/P ablation of atrial fibrillation (Chronic) ICD-10-CM: B05.317, Z86.79  ICD-9-CM: V45.89  10/26/2017 - Present        Essential hypertension (Chronic) ICD-10-CM: I10  ICD-9-CM: 401.9  10/26/2017 - Present        Cramps, muscle, general ICD-10-CM: R25.2  ICD-9-CM: 729.82  10/26/2017 - Present        Low vitamin D level (Chronic) ICD-10-CM: R79.89  ICD-9-CM: 790.6  10/26/2017 - Present        Low vitamin B12 level (Chronic) ICD-10-CM: E53.8  ICD-9-CM: 266.2  10/26/2017 - Present        Cholangiocarcinoma determined by biopsy of biliary tract ([facility] ICD-10-CM: C24.9  ICD-9-CM: 156.9  10/6/2017 - Present        Cholangiocarcinoma of biliary tract ([facility] ICD-10-CM: C24.9  ICD-9-CM: 156.9  9/11/2017 - Present        Common bile duct (CBD) obstruction ICD-10-CM: K83.1  ICD-9-CM: 576.2  7/25/2017 - Present        UTI (urinary tract infection) ICD-10-CM: N39.0  ICD-9-CM: 599.0  7/25/2017 - Present        Obstructive jaundice ICD-10-CM: K83.1  ICD-9-CM: 576.2  7/23/2017 - Present        RESOLVED: Pancreatitis ICD-10-CM: K85.90  ICD-9-CM: 577.0  8/31/2018 - 8/31/2018        RESOLVED: Sjogren's disease ([facility] (Chronic) ICD-10-CM: M35.00  ICD-9-CM: 710.2  10/26/2017 - 12/2/2019        RESOLVED: Sepsis ([facility] ICD-10-CM: A41.9  ICD-9-CM: 038.9, 995.91  8/25/2017 - 8/31/2017              Medications reviewed  Current Facility-Administered Medications   Medication Dose Route Frequency    dextrose 5% and 0.9% NaCl infusion  75 mL/hr IntraVENous CONTINUOUS    polyethylene glycol (MIRALAX) packet 17 g  17 g Oral DAILY PRN    tamsulosin (FLOMAX) capsule 0.4 mg  0.4 mg Oral QHS    pantoprazole (PROTONIX) 40 mg in 0.9% sodium chloride 10 mL injection  40 mg IntraVENous Q24H    sodium chloride (NS) flush 5-40 mL  5-40 mL IntraVENous Q8H    sodium chloride (NS) flush 5-40 mL  5-40 mL IntraVENous PRN    acetaminophen (TYLENOL) tablet 650 mg  650 mg Oral Q4H PRN    oxyCODONE-acetaminophen (PERCOCET) 5-325 mg per tablet 1 Tab  1 Tab Oral Q4H PRN    ondansetron (ZOFRAN) injection 4 mg  4 mg IntraVENous Q4H PRN    glucose chewable tablet 16 g  4 Tab Oral PRN    glucagon (GLUCAGEN) injection 1 mg  1 mg IntraMUSCular PRN    dextrose 10% infusion 0-250 mL  0-250 mL IntraVENous PRN    insulin lispro (HUMALOG) injection   SubCUTAneous TIDAC    HYDROmorphone (PF) (DILAUDID) injection 0.5 mg  0.5 mg IntraVENous Q3H PRN       Care Plan discussed with: Patient/Family, Nurse and Other Dr. Ricardo Augustin  Total time spent with patient: 30 minutes.     Matias Post NP  Hospitalist  Providers can reach me directly at [phone] or [facility]

## 2020-01-02 NOTE — DISCHARGE INSTRUCTIONS
Discharge Instructions       PATIENT ID: Len Ruff  MRN: [id number]   YOB: 1955    DATE OF ADMISSION: 12/31/2019  7:40 AM    DATE OF DISCHARGE: 1/2/2020    PRIMARY CARE PROVIDER: Coy Lam DO     ATTENDING PHYSICIAN: Kate Adams MD  DISCHARGING PROVIDER: Carmelina Ludwig NP    To contact this individual call [phone] and ask the  to page. If unavailable ask to be transferred the Adult Hospitalist Department. DISCHARGE DIAGNOSES Acute recurrent pancreatitis, HTN, DMII, BPH    CONSULTATIONS: IP CONSULT TO GASTROENTEROLOGY  IP CONSULT TO HOSPITALIST  IP CONSULT TO GENERAL SURGERY    PROCEDURES/SURGERIES: ERCP on Monday    PENDING TEST RESULTS:   At the time of discharge the following test results are still pending: NA    FOLLOW UP APPOINTMENTS:   Follow-up Information     Follow up With Specialties Details Why Contact Info    Coy Lam DO Internal Medicine  As needed hospital follow up [address]      Michela Gandhi MD Gastroenterology, Gastroenterology Call today For instructions on returning Monday for ERCP [place] [id number] [phone]             ADDITIONAL CARE RECOMMENDATIONS: Call Dr. Javed Mccoy today or tomorrow for further instructions on returning for your ERCP on Monday. Nothing to eat or drink after midnight Sunday night in preparation for your ERCP on Monday. DIET: Full liquids as tolerated. Advance as tolerated to diabetic diet. ACTIVITY: As tolerated    WOUND CARE: NA    EQUIPMENT needed: NA      DISCHARGE MEDICATIONS:   See Medication Reconciliation Form    · It is important that you take the medication exactly as they are prescribed. · Keep your medication in the bottles provided by the pharmacist and keep a list of the medication names, dosages, and times to be taken in your wallet.    · Do not take other medications without consulting your doctor. NOTIFY YOUR PHYSICIAN FOR ANY OF THE FOLLOWING:   Fever over 101 degrees for 24 hours. Chest pain, shortness of breath, fever, chills, nausea, vomiting, diarrhea, change in mentation, falling, weakness, bleeding. Severe pain or pain not relieved by medications. Or, any other signs or symptoms that you may have questions about. DISPOSITION:   X Home With:   OT  PT  HH  RN       SNF/Inpatient Rehab/LTAC    Independent/assisted living    Hospice    Other:     CDMP Checked: Yes X     PROBLEM LIST Updated:   Yes X       Signed:   Faustino Pratt NP  1/2/2020  2:35 PM

## 2020-01-02 NOTE — PROGRESS NOTES
Spiritual Care Partner Volunteer visited patient in Rm 624 on 1/2/20. Documented by:   Chaplain Torres MDiv, MACE  [phone] ([id number])

## 2020-01-06 ENCOUNTER — ANESTHESIA EVENT (OUTPATIENT)
Dept: ENDOSCOPY | Age: 65
End: 2020-01-06
Payer: COMMERCIAL

## 2020-01-06 ENCOUNTER — ANESTHESIA (OUTPATIENT)
Dept: ENDOSCOPY | Age: 65
End: 2020-01-06
Payer: COMMERCIAL

## 2020-01-06 ENCOUNTER — APPOINTMENT (OUTPATIENT)
Dept: GENERAL RADIOLOGY | Age: 65
End: 2020-01-06
Attending: INTERNAL MEDICINE
Payer: COMMERCIAL

## 2020-01-06 ENCOUNTER — HOSPITAL ENCOUNTER (OUTPATIENT)
Age: 65
Setting detail: OUTPATIENT SURGERY
Discharge: HOME | End: 2020-01-06
Attending: INTERNAL MEDICINE | Admitting: INTERNAL MEDICINE
Payer: COMMERCIAL

## 2020-01-06 VITALS
HEART RATE: 65 BPM | RESPIRATION RATE: 16 BRPM | TEMPERATURE: 96.5 F | DIASTOLIC BLOOD PRESSURE: 90 MMHG | OXYGEN SATURATION: 97 % | WEIGHT: 163 LBS | HEIGHT: 68 IN | SYSTOLIC BLOOD PRESSURE: 128 MMHG | BODY MASS INDEX: 24.71 KG/M2

## 2020-01-06 PROCEDURE — C2617 STENT, NON-COR, TEM W/O DEL: HCPCS | Performed by: INTERNAL MEDICINE

## 2020-01-06 PROCEDURE — C1726 CATH, BAL DIL, NON-VASCULAR: HCPCS | Performed by: INTERNAL MEDICINE

## 2020-01-06 PROCEDURE — 77030040361 HC SLV COMPR DVT MDII -B: Performed by: INTERNAL MEDICINE

## 2020-01-06 PROCEDURE — C1769 GUIDE WIRE: HCPCS | Performed by: INTERNAL MEDICINE

## 2020-01-06 PROCEDURE — 74011636320 HC RX REV CODE- 636/320: Performed by: INTERNAL MEDICINE

## 2020-01-06 PROCEDURE — 74011250636 HC RX REV CODE- 250/636: Performed by: INTERNAL MEDICINE

## 2020-01-06 PROCEDURE — 74011250636 HC RX REV CODE- 250/636: Performed by: NURSE ANESTHETIST, CERTIFIED REGISTERED

## 2020-01-06 PROCEDURE — 77030010603 HC BLN DEV INFL BSC -B: Performed by: INTERNAL MEDICINE

## 2020-01-06 PROCEDURE — 77030026438 HC STYL ET INTUB CARD -A: Performed by: ANESTHESIOLOGY

## 2020-01-06 PROCEDURE — 74011000250 HC RX REV CODE- 250: Performed by: NURSE ANESTHETIST, CERTIFIED REGISTERED

## 2020-01-06 PROCEDURE — 74011250637 HC RX REV CODE- 250/637: Performed by: INTERNAL MEDICINE

## 2020-01-06 PROCEDURE — 77030008684 HC TU ET CUF COVD -B: Performed by: ANESTHESIOLOGY

## 2020-01-06 PROCEDURE — 76060000034 HC ANESTHESIA 1.5 TO 2 HR: Performed by: INTERNAL MEDICINE

## 2020-01-06 PROCEDURE — 76040000009: Performed by: INTERNAL MEDICINE

## 2020-01-06 PROCEDURE — 77030010803: Performed by: INTERNAL MEDICINE

## 2020-01-06 PROCEDURE — 77030007288 HC DEV LOK BILI BSC -A: Performed by: INTERNAL MEDICINE

## 2020-01-06 DEVICE — PANCREATIC STENT
Type: IMPLANTABLE DEVICE | Site: PANCREAS | Status: FUNCTIONAL
Brand: ADVANIX™ PANCREATIC STENT

## 2020-01-06 RX ORDER — DEXTROMETHORPHAN/PSEUDOEPHED 2.5-7.5/.8
1.2 DROPS ORAL
Status: DISCONTINUED | OUTPATIENT
Start: 2020-01-06 | End: 2020-01-06 | Stop reason: HOSPADM

## 2020-01-06 RX ORDER — SODIUM CHLORIDE 0.9 % (FLUSH) 0.9 %
5-40 SYRINGE (ML) INJECTION EVERY 8 HOURS
Status: DISCONTINUED | OUTPATIENT
Start: 2020-01-06 | End: 2020-01-06 | Stop reason: HOSPADM

## 2020-01-06 RX ORDER — SODIUM CHLORIDE 0.9 % (FLUSH) 0.9 %
5-40 SYRINGE (ML) INJECTION AS NEEDED
Status: DISCONTINUED | OUTPATIENT
Start: 2020-01-06 | End: 2020-01-06 | Stop reason: HOSPADM

## 2020-01-06 RX ORDER — GLYCOPYRROLATE 0.2 MG/ML
INJECTION INTRAMUSCULAR; INTRAVENOUS AS NEEDED
Status: DISCONTINUED | OUTPATIENT
Start: 2020-01-06 | End: 2020-01-06 | Stop reason: HOSPADM

## 2020-01-06 RX ORDER — NALOXONE HYDROCHLORIDE 0.4 MG/ML
0.4 INJECTION, SOLUTION INTRAMUSCULAR; INTRAVENOUS; SUBCUTANEOUS
Status: DISCONTINUED | OUTPATIENT
Start: 2020-01-06 | End: 2020-01-06 | Stop reason: HOSPADM

## 2020-01-06 RX ORDER — PHENYLEPHRINE HCL IN 0.9% NACL 0.4MG/10ML
SYRINGE (ML) INTRAVENOUS AS NEEDED
Status: DISCONTINUED | OUTPATIENT
Start: 2020-01-06 | End: 2020-01-06 | Stop reason: HOSPADM

## 2020-01-06 RX ORDER — EPINEPHRINE 0.1 MG/ML
1 INJECTION INTRACARDIAC; INTRAVENOUS
Status: DISCONTINUED | OUTPATIENT
Start: 2020-01-06 | End: 2020-01-06 | Stop reason: HOSPADM

## 2020-01-06 RX ORDER — ROCURONIUM BROMIDE 10 MG/ML
INJECTION, SOLUTION INTRAVENOUS AS NEEDED
Status: DISCONTINUED | OUTPATIENT
Start: 2020-01-06 | End: 2020-01-06 | Stop reason: HOSPADM

## 2020-01-06 RX ORDER — ONDANSETRON 2 MG/ML
INJECTION INTRAMUSCULAR; INTRAVENOUS AS NEEDED
Status: DISCONTINUED | OUTPATIENT
Start: 2020-01-06 | End: 2020-01-06 | Stop reason: HOSPADM

## 2020-01-06 RX ORDER — FLUMAZENIL 0.1 MG/ML
0.2 INJECTION INTRAVENOUS
Status: DISCONTINUED | OUTPATIENT
Start: 2020-01-06 | End: 2020-01-06 | Stop reason: HOSPADM

## 2020-01-06 RX ORDER — DEXAMETHASONE SODIUM PHOSPHATE 4 MG/ML
INJECTION, SOLUTION INTRA-ARTICULAR; INTRALESIONAL; INTRAMUSCULAR; INTRAVENOUS; SOFT TISSUE AS NEEDED
Status: DISCONTINUED | OUTPATIENT
Start: 2020-01-06 | End: 2020-01-06 | Stop reason: HOSPADM

## 2020-01-06 RX ORDER — ATROPINE SULFATE 0.1 MG/ML
0.5 INJECTION INTRAVENOUS
Status: DISCONTINUED | OUTPATIENT
Start: 2020-01-06 | End: 2020-01-06 | Stop reason: HOSPADM

## 2020-01-06 RX ORDER — LIDOCAINE HYDROCHLORIDE 20 MG/ML
INJECTION, SOLUTION EPIDURAL; INFILTRATION; INTRACAUDAL; PERINEURAL AS NEEDED
Status: DISCONTINUED | OUTPATIENT
Start: 2020-01-06 | End: 2020-01-06 | Stop reason: HOSPADM

## 2020-01-06 RX ORDER — NEOSTIGMINE METHYLSULFATE 1 MG/ML
INJECTION INTRAVENOUS AS NEEDED
Status: DISCONTINUED | OUTPATIENT
Start: 2020-01-06 | End: 2020-01-06 | Stop reason: HOSPADM

## 2020-01-06 RX ORDER — PROPOFOL 10 MG/ML
INJECTION, EMULSION INTRAVENOUS AS NEEDED
Status: DISCONTINUED | OUTPATIENT
Start: 2020-01-06 | End: 2020-01-06 | Stop reason: HOSPADM

## 2020-01-06 RX ORDER — SODIUM CHLORIDE 9 MG/ML
INJECTION, SOLUTION INTRAVENOUS
Status: DISCONTINUED | OUTPATIENT
Start: 2020-01-06 | End: 2020-01-06 | Stop reason: HOSPADM

## 2020-01-06 RX ADMIN — PROPOFOL 150 MG: 10 INJECTION, EMULSION INTRAVENOUS at 13:09

## 2020-01-06 RX ADMIN — GLUCAGON HYDROCHLORIDE 0.5 MG: KIT at 14:19

## 2020-01-06 RX ADMIN — SODIUM CHLORIDE: 900 INJECTION, SOLUTION INTRAVENOUS at 14:20

## 2020-01-06 RX ADMIN — GLUCAGON HYDROCHLORIDE 0.5 MG: KIT at 13:30

## 2020-01-06 RX ADMIN — GLUCAGON HYDROCHLORIDE 0.5 MG: KIT at 14:43

## 2020-01-06 RX ADMIN — Medication 60 MCG: at 14:37

## 2020-01-06 RX ADMIN — GLYCOPYRROLATE 0.4 MG: 0.2 INJECTION, SOLUTION INTRAMUSCULAR; INTRAVENOUS at 14:58

## 2020-01-06 RX ADMIN — Medication 120 MCG: at 14:03

## 2020-01-06 RX ADMIN — GLUCAGON HYDROCHLORIDE 0.5 MG: KIT at 14:36

## 2020-01-06 RX ADMIN — LIDOCAINE HYDROCHLORIDE 100 MG: 20 INJECTION, SOLUTION EPIDURAL; INFILTRATION; INTRACAUDAL; PERINEURAL at 13:09

## 2020-01-06 RX ADMIN — DEXAMETHASONE SODIUM PHOSPHATE 8 MG: 4 INJECTION, SOLUTION INTRAMUSCULAR; INTRAVENOUS at 14:31

## 2020-01-06 RX ADMIN — NEOSTIGMINE METHYLSULFATE 3 MG: 1 INJECTION, SOLUTION INTRAVENOUS at 14:58

## 2020-01-06 RX ADMIN — PROPOFOL 100 MG: 10 INJECTION, EMULSION INTRAVENOUS at 13:38

## 2020-01-06 RX ADMIN — ONDANSETRON HYDROCHLORIDE 4 MG: 2 INJECTION, SOLUTION INTRAMUSCULAR; INTRAVENOUS at 14:31

## 2020-01-06 RX ADMIN — PROPOFOL 200 MG: 10 INJECTION, EMULSION INTRAVENOUS at 13:56

## 2020-01-06 RX ADMIN — SODIUM CHLORIDE: 900 INJECTION, SOLUTION INTRAVENOUS at 13:08

## 2020-01-06 RX ADMIN — ROCURONIUM BROMIDE 30 MG: 10 SOLUTION INTRAVENOUS at 13:09

## 2020-01-06 RX ADMIN — PROPOFOL 50 MG: 10 INJECTION, EMULSION INTRAVENOUS at 13:13

## 2020-01-06 RX ADMIN — Medication 120 MCG: at 13:32

## 2020-01-06 RX ADMIN — Medication 80 MCG: at 14:47

## 2020-01-06 RX ADMIN — Medication 60 MCG: at 14:41

## 2020-01-06 RX ADMIN — Medication 120 MCG: at 13:56

## 2020-01-06 RX ADMIN — ROCURONIUM BROMIDE 10 MG: 10 SOLUTION INTRAVENOUS at 14:30

## 2020-01-06 RX ADMIN — Medication 40 MCG: at 14:00

## 2020-01-06 RX ADMIN — Medication 120 MCG: at 13:57

## 2020-01-06 RX ADMIN — GLUCAGON HYDROCHLORIDE 0.5 MG: KIT at 13:48

## 2020-01-06 NOTE — PROCEDURES
[address]  [address] [facility] [address] [facility], [address]  [phone]                   ERCP NOTE    NAME:  Lauren Hermosillo   :   1955   MRN:   [id number]     Date/Time:  2020 2:58 PM    Procedure Type:   ERCPwith pancreatic stent placement, pancreatic dilation     Indications: acute pancreatitis, pancreatic duct anastomotic stricture  Pre-operative Diagnosis: see indication above  Post-operative Diagnosis:  See findings below  : Mariah Romano MD    Staff: Endoscopy Technician-Lennox SIMON  Endoscopy RN-1: Yony Segal     Implants: One 7 Fr X 9 cm external pigtail and single internal flap plastic stent was placed in the pancreatic duct    Referring Provider:    Diego Mendoza MD, Madhu Corey DO    Sedation:  General anesthesia    Procedure Details:  After informed consent was obtained with all risks and benefits of procedure explained, the patient was taken to the fluoroscopy suite and placed in the prone position. Upon sequential sedation as per above, the Olympus duodenoscope [id number]   was inserted via the mouthpeice and carefully advanced to the second portion of the duodenum. The quality of visualization was good. The duodenoscope was withdrawn into a short position. Findings:   Esophagus:normal  Stomach: normal mucosa. Lot of bile was seen in the stomach  Duodenum/jejunum: Pylorus preserving Whipple procedure anatomy was noted. Pancreatico-biliary limb was entered till the blind end and was normal.   Ampulla:surgically absent  Cholangiogram: -Hepatico-jejunostomy was noted in the jejunal limb. Normal anastomosis. Cannulation was performed with tandem biliary catheter and Dreamwire. Contrast injection was performed. Right and left ducts were noted. Normal intrahepatic ducts and wide open anastomosis was noted. Pancreatogram:-Pancreatico-jejunal anastomosis was pin hole and severely stenotic.  Cannulation was performed using a Dreamwire. Contrast was injected gently. Main pancreatic duct was diffusely dilated till the anastomosis and maximal diameter was about 8 mm. Dilation was performed at anastomosis using Hurricane balloon 6 mm. One 7 Fr X 9 cm external pigtail and single internal flap plastic stent was placed in the pancreatic duct. Stent was in good position and clear fluid was draining promptly. Specimen Removed: * No specimens in log *    Complications: None. EBL:  None. Interventions:    Cholangiogram: -Hepatico-jejunostomy was noted in the jejunal limb. Normal anastomosis. Cannulation was performed with tandem biliary catheter and Dreamwire. Contrast injection was performed. Right and left ducts were noted. Normal intrahepatic ducts and wide open anastomosis was noted. Pancreatogram:-Pancreatico-jejunal anastomosis was pin hole and severely stenotic. Cannulation was performed using a Dreamwire. Contrast was injected gently. Main pancreatic duct was diffusely dilated till the anastomosis and maximal diameter was about 8 mm. Dilation was performed at anastomosis using Hurricane balloon 6 mm. One 7 Fr X 9 cm external pigtail and single internal flap plastic stent was placed in the pancreatic duct. Stent was in good position and clear fluid was draining promptly. 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 -Resume normal medication(s).     -Watch for complications  -ERCP with stent change in 3 months  -Follow up with Tiffany Austin and Joycelyn Billingsley for further evaluation of the mass       Discharge Disposition:  Home following recovery in [facility] Erica Barrios MD  1/6/2020  2:58 PM

## 2020-01-06 NOTE — ANESTHESIA PREPROCEDURE EVALUATION
Relevant Problems   No relevant active problems       Anesthetic History     PONV          Review of Systems / Medical History  Patient summary reviewed, nursing notes reviewed and pertinent labs reviewed    Pulmonary                   Neuro/Psych       CVA  TIA     Cardiovascular    Hypertension        Dysrhythmias            GI/Hepatic/Renal                Endo/Other    Diabetes         Other Findings              Physical Exam    Airway  Mallampati: II  TM Distance: > 6 cm  Neck ROM: normal range of motion   Mouth opening: Normal     Cardiovascular    Rhythm: regular  Rate: normal         Dental  No notable dental hx       Pulmonary  Breath sounds clear to auscultation               Abdominal         Other Findings            Anesthetic Plan    ASA: 2  Anesthesia type: general          Induction: Intravenous  Anesthetic plan and risks discussed with: Patient

## 2020-01-06 NOTE — ANESTHESIA POSTPROCEDURE EVALUATION
Post-Anesthesia Evaluation and Assessment    Patient: Talya Mcdaniel MRN: [id number]  SSN: [id number]    YOB: 1955  Age: 62 y.o. Sex: male      I have evaluated the patient and they are stable and ready for discharge from the PACU. Cardiovascular Function/Vital Signs  Visit Vitals  /80   Pulse 70   Temp 36.7 °C (98.1 °F)   Resp 20   Ht 5' 8\" (1.727 m)   Wt 73.9 kg (163 lb)   SpO2 98%   BMI 24.78 kg/m²       Patient is status post MAC anesthesia for Procedure(s):  ENDOSCOPIC RETROGRADE CHOLANGIOPANCREATOGRAPHY (ERCP)  ENDOSCOPY WITH PROSTHESIS OR STENT PLACEMENT  ESOPHAGEAL DILATION. Nausea/Vomiting: None    Postoperative hydration reviewed and adequate. Pain:  Pain Scale 1: Numeric (0 - 10) (01/06/20 1516)  Pain Intensity 1: 0 (01/06/20 1516)   Managed    Neurological Status: At baseline    Mental Status, Level of Consciousness: Alert and  oriented to person, place, and time    Pulmonary Status:   O2 Device: Room air (01/06/20 1516)   Adequate oxygenation and airway patent    Complications related to anesthesia: None    Post-anesthesia assessment completed. No concerns    Signed By: Sara Estrella MD     January 6, 2020              Procedure(s):  ENDOSCOPIC RETROGRADE CHOLANGIOPANCREATOGRAPHY (ERCP)  ENDOSCOPY WITH PROSTHESIS OR STENT PLACEMENT  ESOPHAGEAL DILATION. general    <BSHSIANPOST>    Vitals Value Taken Time   /80 1/6/2020  3:18 PM   Temp     Pulse 70 1/6/2020  3:21 PM   Resp 19 1/6/2020  3:21 PM   SpO2 97 % 1/6/2020  3:21 PM   Vitals shown include unvalidated device data.

## 2020-01-06 NOTE — DISCHARGE INSTRUCTIONS
[address]  [address]  [phone]  1955    DISCOMFORT:  Sore throat- throat lozenges or warm salt water gargle  redness at IV site- apply warm compress to area; if redness or soreness persist- contact your physician  Gaseous discomfort- walking, belching will help relieve any discomfort    DIET  You may eat and drink after you leave. You may resume your regular diet - however -  remember your colon is empty and a heavy meal will produce gas. Avoid these foods:  vegetables, fried / greasy foods, carbonated drinks    ACTIVITY  You may resume your normal daily activities   Spend the remainder of the day resting -  avoid any strenuous activity. You may not operate a vehicle for 12 hours  You may not engage in an occupation involving machinery or appliances for rest of today  You may not drink alcoholic beverages for at least 12 hours  Avoid making any critical decisions for at least 24 hour    CALL M.D. ANY SIGN OF   Increasing pain, nausea, vomiting  Abdominal distension (swelling)  New increased bleeding (oral or rectal)  Fever (chills)  Pain in chest area  Bloody discharge from nose or mouth  Shortness of breath    Follow-up Instructions:   Call Dr. Thomas Coley for any questions or problems. If we took a biopsy please call the office within 2 weeks to discuss your pathology results. Telephone # [phone]       ENDOSCOPY FINDINGS:   1.  Pancreatic Anastomotic Stricture-dilated and stented

## 2020-01-06 NOTE — PROGRESS NOTES
Pt. Discharged after vss and both written and verbal discharge instructions given. Pt. Received 1500 ml of fluid total. Denies any pain and states no nausea.

## 2020-01-06 NOTE — PERIOP NOTES

## 2020-01-06 NOTE — H&P
[facility]  217 [facility] [address]  [address], [address]  [phone]                                History and Physical     NAME: Bairon Daniel   :  1955   MRN:  [id number]     HPI:  The patient was seen and examined. Past Surgical History:   Procedure Laterality Date    ABDOMEN SURGERY PROC UNLISTED  10/2017    Whipple     HX GI      COLONOSCOPY, POLYPS (BENIGN)    HX GI  10/06/2017    [facility]     [address]      Ablation     HX ORTHOPAEDIC      Spinal fusion W/ HARDWARE    HX OTHER SURGICAL  2017    [place] Cath, Dr. Robe Vasquez      PORTACATH - then removed    NEUROLOGICAL PROCEDURE UNLISTED      Ting hole washout from cerebral hemorrhage     Past Medical History:   Diagnosis Date    Arrhythmia     Previous a.fib, ablation, NSR now; NO LONGER FOLLOWED BY CARDIOLOGIST.     Autoimmune disease ([facility]     SJOGREN'S    Cancer ([facility]     COMMON BILE DUCT, ADENOCARCINOMA    Diabetes ([facility]     Family history of skin cancer     Hypertension     Sepsis ([facility] 2017    STENTING TO BILE DUCT    Status post chemotherapy     Stroke [facility])     brain aneurysm - no deficits    Sun-damaged skin     Sunburn, blistering      Social History     Tobacco Use    Smoking status: Never Smoker    Smokeless tobacco: Never Used   Substance Use Topics    Alcohol use: Never     Frequency: Never     Comment: very rare    Drug use: No     Allergies   Allergen Reactions    Shellfish Derived Anaphylaxis     Soft shell crabs    Morphine Nausea and Vomiting    Pcn [Penicillins] Other (comments)     Pt stated \"always been told PCN\"  Pt tolerated other cephalosporins in the past     Family History   Problem Relation Age of Onset    Cancer Father         Breast and Colon    Cancer Mother         LEUKEMIA    No Known Problems Sister     No Known Problems Brother     No Known Problems Sister     Anesth Problems Neg Hx Current Facility-Administered Medications   Medication Dose Route Frequency    sodium chloride (NS) flush 5-40 mL  5-40 mL IntraVENous Q8H    sodium chloride (NS) flush 5-40 mL  5-40 mL IntraVENous PRN    naloxone (NARCAN) injection 0.4 mg  0.4 mg IntraVENous Multiple    flumazenil (ROMAZICON) 0.1 mg/mL injection 0.2 mg  0.2 mg IntraVENous Multiple    simethicone (MYLICON) 00DO/0.7QI oral drops 80 mg  1.2 mL Oral Multiple    atropine injection 0.5 mg  0.5 mg IntraVENous ONCE PRN    EPINEPHrine (ADRENALIN) 0.1 mg/mL syringe 1 mg  1 mg Endoscopically ONCE PRN    glucagon (GLUCAGEN) injection 1 mg  1 mg IntraVENous ONCE PRN    iopamidol (ISOVUE 300) 61 % contrast injection 50 mL  50 mL Other ONCE    iopamidol (ISOVUE 300) 61 % contrast injection             PHYSICAL EXAM:  General: WD, WN. Alert, cooperative, no acute distress    HEENT: NC, Atraumatic. PERRLA, EOMI. Anicteric sclerae. Lungs:  CTA Bilaterally. No Wheezing/Rhonchi/Rales. Heart:  Regular  rhythm,  No murmur, No Rubs, No Gallops  Abdomen: Soft, Non distended, Non tender.  +Bowel sounds, no HSM  Extremities: No c/c/e  Neurologic:  CN 2-12 gi, Alert and oriented X 3. No acute neurological distress   Psych:   Good insight. Not anxious nor agitated. The heart, lungs and mental status were satisfactory for the administration of GA and for the procedure.       Mallampati score: 2       Assessment:   · Pancreatic duct stricture  · Recurrent pancreatitis-s/p WHipple    Plan:   · Endoscopic procedure  · GA

## 2020-01-07 ENCOUNTER — PATIENT OUTREACH (OUTPATIENT)
Dept: OTHER | Age: 65
End: 2020-01-07

## 2020-01-07 NOTE — PROGRESS NOTES
ADMITTING DIAGNOSES & HOSPITAL COURSE:   From H&P 12/31/19:  \"Ridge Cristina is a 62 y. o. male who presents with recurrent abdominal pain.  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 This bout of abdominal pain started earlythis  morning. Patient was recently discharged on 12/6/19 for evaluation/treatment of the same.  He states he has had acute pancreatitis 3 times in the last 3 months.  Dr. Ledy Murcia his gastroenterologist. Denies fever, cold symptoms, cough, headache, neck pain, visual changes, focal weakness or rash. Denies any difficulty breathing, difficulty swallowing, SOB or chest pain. Reports nausea but denies any vomiting or diarrhea. Pt. Reports that he does not use any alcohol or has not had any new medications.  He has not had any food or medications since yesterday. \"      Follow up phone call to patient, two pt identifiers verified. Discussed patient's goals:   Goals      Completes all FU appointments as ordered at [place]      · Assess patient's current effectiveness of condition self-management;  · Identify any barriers to FU appointments or care access;  · Determine patient's current access to PCP services - Patient has follow up with Dr. Claudia Holder on 12/2.     11/27: PCP on 12/2. CT end of December, waiting to hear from hospital.    12/19 PCP F/U TBD - pt will call today to schedule f/u in 3 - 5 days. GI - TBD - pt will call today to schedule for within 1 - 2 weeks    12/16: Patient has an appointment with Dr. Claudia Holder on 1/17. Will be scheduled for surgery to have stent placed soon. 1/7: Follow up with surgeon on 1/9, PCP on 1/17.          Demonstrates no signs of complications or red flags in 30 days       Review and discuss importance of monitoring and reporting red flags;   Review medications and when taken with patient to ensure understanding;   Educate patient when to call the physician or 911;  · Assess for any barriers with care access or mobility needs at home    11/27: No red flags. 12/9/19  Pt will monitor for chest pain, shortness of breath, fever, chills, nausea, vomiting, diarrhea, change in mentation, falling, weakness, bleeding    Lab Results   Component Value Date/Time    Lipase 630 (H) 12/06/2019 06:23 AM     12/16: Patient states he had to go to the ED because his scar tissues was inflamed from previous surgery. They are planning to put in a stent once scar tissue is less inflamed. Patient verbalized understanding for when to call 911 and when to call his provider. 1/7: Patient was admitted to [facility] for pancreatitis on 12/31 and discharged on 1/2. He has had stent placed on 1/6. Only pain now is from his throat. He states he is feeling much better. Patient's primary care provider relationship reviewed with patient and modified, as applicable. Red flags:   Call 911 anytime you think you may need emergency care. For example, call if:   You passed out (lost consciousness).  You pass maroon or very bloody stools.  You vomit blood or what looks like coffee grounds.  You have new, severe belly pain. Call your doctor now or seek immediate medical care if:  Your pain gets worse, especially if it becomes focused in one area of your belly.  You have a new or higher fever.  Your stools are black and look like tar, or they have streaks of blood.  You have unexpected vaginal bleeding.  You have symptoms of a urinary tract infection. These may include:  ? Pain when you urinate. ? Urinating more often than usual.  ? Blood in your urine.  You are dizzy or lightheaded, or you feel like you may faint  Watch closely for changes in your health, and be sure to contact your doctor if:    Patient has tolerated rice and gravy this morning for breakfast.    He is not taking the ordered percocet.     Barriers/Challenges to Care: []  Decline in memory    []  Language barrier     []  Emotional []  Limited mobility  []  Lack of motivation     [] Vision, hearing or cognitive impairment []  Knowledge [] Financial Barriers []  Lack of support  []  Pain []  Other [x]  None    Key pt activities to achieve better health:   []  Weight loss  []  Improved diabetic control  []  Decreased cholesterol levels  []  Decreased blood pressure  [x]  Heart healthy diet  []    Upcoming appointments:   Future Appointments   Date Time Provider Sloane Roach   1/9/2020  3:20 PM Stefan Au MD [facility]   1/17/2020 10:30 AM [place], [address]   2/3/2020  8:30 AM Luis Romero MD [address]   2/27/2020  9:00 AM [facility]   2/28/2020  9:15 AM Corinna Aguilar MD [facility]   4/6/2020  8:45 AM [place] [address]   7/27/2020 11:00 AM NIKI Desir 2 for next call: Will call in one week, 1/14. Red flags? Pain?

## 2020-01-09 ENCOUNTER — OFFICE VISIT (OUTPATIENT)
Dept: SURGERY | Age: 65
End: 2020-01-09

## 2020-01-09 VITALS
WEIGHT: 161 LBS | HEIGHT: 68 IN | HEART RATE: 80 BPM | TEMPERATURE: 98.2 F | BODY MASS INDEX: 24.4 KG/M2 | DIASTOLIC BLOOD PRESSURE: 77 MMHG | OXYGEN SATURATION: 95 % | SYSTOLIC BLOOD PRESSURE: 139 MMHG | RESPIRATION RATE: 17 BRPM

## 2020-01-09 DIAGNOSIS — C22.1 CHOLANGIOCARCINOMA OF BILIARY TRACT (HCC): Primary | ICD-10-CM

## 2020-01-09 DIAGNOSIS — R11.10 VOMITING, INTRACTABILITY OF VOMITING NOT SPECIFIED, PRESENCE OF NAUSEA NOT SPECIFIED, UNSPECIFIED VOMITING TYPE: ICD-10-CM

## 2020-01-09 NOTE — PROGRESS NOTES
1. Have you been to the ER, urgent care clinic since your last visit? Hospitalized since your last visit? [facility] 12/31/2019    2. Have you seen or consulted any other health care providers outside of the [address] [place] [address] since your last visit? Include any pap smears or colon screening.  No

## 2020-01-09 NOTE — LETTER
1/12/20 Patient: Wilmer Rivera YOB: 1955 Date of Visit: 1/9/2020 Professor Carine Lester DO 
[address] [address] VIA In Basket Dear Professor Carine Lester DO, Thank you for referring Mr. Sylwia Regalado to [facility] for evaluation. My notes for this consultation are attached. If you have questions, please do not hesitate to call me. I look forward to following your patient along with you.  
 
 
Sincerely, 
 
Jeromy Carmen MD

## 2020-01-10 NOTE — ADT AUTH CERT NOTES
Pancreatitis - Care Day 1 (12/31/2019) by Carmelina Starks RN         Review Status Review Entered   Completed 1/2/2020 12:08       Criteria Review      Care Day: 1 Care Date: 12/31/2019 Level of Care: Inpatient Floor    Guideline Day 1    Level Of Care    (X) ICU [D] or floor    1/2/2020 12:08:35 EST by Jessica Streeter      Floor    Clinical Status    (X) * Clinical Indications met [E]    1/2/2020 12:08:35 EST by Jessica Double      63 y/o sdmitted with acute pancreatitis and abdominal pain    Interventions    (X) Abdominal imaging    1/2/2020 12:08:54 EST by Jessica Double      CT of abodmen pelvis-IMPRESSION  IMPRESSION:  1.  Ongoing peripancreatic inflammation which has increased in interval  consistent with acute pancreatitis. No definite necrosis or pseudocyst formation  is present.      2.  Status post Whipple    1/2/2020 12:08:35 EST by Jessica Double      CT abdomen pelvis    Medications    (X) Parenteral analgesia    1/2/2020 12:08:35 EST by Jessica Double      IV morphine    * Milestone   Additional Notes   Gordon Ivan is a 62 y.o. male who presents with recurrent abdominal pain.  Hew has a PMH of cholangiocarcinoma s/p pylorus-preserving Whipple 10/2017, HTN, DM, BPH and recurrent pancreatitis. This bout of abdominal pain started earlythis  morning. Patient was recently discharged on 12/6/19 for evaluation/treatment of the same. [facility] states he has had acute pancreatitis 3 times in the last 3 months.  Dr. Damián Germain is his gastroenterologist. Denies fever, cold symptoms, cough, headache, neck pain, visual changes, focal weakness or rash. Denies any difficulty breathing, difficulty swallowing, SOB or chest pain. Reports nausea but denies any vomiting or diarrhea.  Pt. Reports that he does not use any alcohol or has not had any new medications.  He has not had any food or medications since yesterday      Physical Exam:    Visit Vitals   /59 (BP 1 Location: Right arm, BP Patient Position: At rest) Pulse 85   Temp 97.8 °F (36.6 °C)   Resp 18   SpO2 95%       General: Alert, cooperative, no distress, appears stated age. Head: Normocephalic, without obvious abnormality, atraumatic. Eyes: Conjunctivae/corneas clear. PERRL, EOMs intact. Fundi benign   Throat: Lips, mucosa, and tongue normal. Teeth and gums normal.   Neck: Supple, symmetrical, trachea midline, no adenopathy, thyroid: no enlargement/tenderness/nodules, no carotid bruit and no JVD. Back:   Symmetric, no curvature. ROM normal. No CVA tenderness. Lungs:   Clear to auscultation bilaterally. Chest wall: No tenderness or deformity. Heart: Regular rate and rhythm, S1, S2 normal, no murmur, . Abdomen:   Soft, mild-tenderness. Mild tenderness   Extremities: Extremities normal, atraumatic, no cyanosis or edema. Pulses: 2+ and symmetric all extremities. Skin: Skin color, texture, turgor normal. No rashes or lesions   Lymph nodes: Cervical, supraclavicular, and axillary nodes normal.   Neurologic: CNII-XII intact. Normal strength, sensation and reflexes throughout. Objective:           Physical Exam:    Visit Vitals   /59 (BP 1 Location: Right arm, BP Patient Position: At rest)   Pulse 85   Temp 97.8 °F (36.6 °C)   Resp 18   SpO2 95%       General: Alert, cooperative, no distress, appears stated age. Head: Normocephalic, without obvious abnormality, atraumatic. Eyes: Conjunctivae/corneas clear. PERRL, EOMs intact. Fundi benign   Throat: Lips, mucosa, and tongue normal. Teeth and gums normal.   Neck: Supple, symmetrical, trachea midline, no adenopathy, thyroid: no enlargement/tenderness/nodules, no carotid bruit and no JVD. Back:   Symmetric, no curvature. ROM normal. No CVA tenderness. Lungs:   Clear to auscultation bilaterally. Chest wall: No tenderness or deformity. Heart: Regular rate and rhythm, S1, S2 normal, no murmur, . Abdomen:   Soft, mild-tenderness.  Mild tenderness   Extremities: Extremities normal, atraumatic, no cyanosis or edema. Pulses: 2+ and symmetric all extremities. Skin: Skin color, texture, turgor normal. No rashes or lesions   Lymph nodes: Cervical, supraclavicular, and axillary nodes normal.   Neurologic: CNII-XII intact. Normal strength, sensation and reflexes throughout.           Data Review: All diagnostic labs and studies have been reviewed.       Recent Results   Recent Results (from the past 24 hour(s))   SAMPLES BEING HELD     Collection Time: 12/31/19  7:37 AM   Result Value Ref Range     SAMPLES BEING HELD 1 RED, 1 LORENE       COMMENT          Add-on orders for these samples will be processed based on acceptable specimen integrity and analyte stability, which may vary by analyte. METABOLIC PANEL, COMPREHENSIVE     Collection Time: 12/31/19  7:37 AM   Result Value Ref Range     Sodium 141 136 - 145 mmol/L     Potassium 3.6 3.5 - 5.1 mmol/L     Chloride 106 97 - 108 mmol/L     CO2 28 21 - 32 mmol/L     Anion gap 7 5 - 15 mmol/L     Glucose 143 (H) 65 - 100 mg/dL     BUN 19 6 - 20 MG/DL     Creatinine 0.83 0.70 - 1.30 MG/DL     BUN/Creatinine ratio 23 (H) 12 - 20       GFR est AA >60 >60 ml/min/1.73m2     GFR est non-AA >60 >60 ml/min/1.73m2     Calcium 9.4 8.5 - 10.1 MG/DL     Bilirubin, total 1.6 (H) 0.2 - 1.0 MG/DL     ALT (SGPT) 41 12 - 78 U/L     AST (SGOT) 18 15 - 37 U/L     Alk.  phosphatase 143 (H) 45 - 117 U/L     Protein, total 6.8 6.4 - 8.2 g/dL     Albumin 4.1 3.5 - 5.0 g/dL     Globulin 2.7 2.0 - 4.0 g/dL     A-G Ratio 1.5 1.1 - 2.2     CBC WITH AUTOMATED DIFF     Collection Time: 12/31/19  7:37 AM   Result Value Ref Range     WBC 13.5 (H) 4.1 - 11.1 K/uL     RBC 5.34 4.10 - 5.70 M/uL     HGB 15.8 12.1 - 17.0 g/dL     HCT 49.8 36.6 - 50.3 %     MCV 93.3 80.0 - 99.0 FL     MCH 29.6 26.0 - 34.0 PG     MCHC 31.7 30.0 - 36.5 g/dL     RDW 14.6 (H) 11.5 - 14.5 %     PLATELET 780 (L) 527 - 400 K/uL     MPV 11.5 8.9 - 12.9 FL     NRBC 0.0 0  WBC     ABSOLUTE NRBC 0.00 0.00 - 0.01 K/uL     NEUTROPHILS 85 (H) 32 - 75 %     LYMPHOCYTES 7 (L) 12 - 49 %     MONOCYTES 6 5 - 13 %     EOSINOPHILS 1 0 - 7 %     BASOPHILS 0 0 - 1 %     IMMATURE GRANULOCYTES 1 (H) 0.0 - 0.5 %     ABS. NEUTROPHILS 11.5 (H) 1.8 - 8.0 K/UL     ABS. LYMPHOCYTES 0.9 0.8 - 3.5 K/UL     ABS. MONOCYTES 0.8 0.0 - 1.0 K/UL     ABS. EOSINOPHILS 0.2 0.0 - 0.4 K/UL     ABS. BASOPHILS 0.1 0.0 - 0.1 K/UL     ABS. IMM. GRANS. 0.1 (H) 0.00 - 0.04 K/UL     DF AUTOMATED    LIPASE     Collection Time: 12/31/19  7:37 AM   Result Value Ref Range     Lipase >3,000 (H) 73 - 393 U/L   MAGNESIUM     Collection Time: 12/31/19  7:37 AM   Result Value Ref Range     Magnesium 2.4 1.6 - 2.4 mg/dL   POC LACTIC ACID     Collection Time: 12/31/19  7:43 AM   Result Value Ref Range     Lactic Acid (POC) 0.98 0.40 - 2.00 mmol/L   URINALYSIS W/ RFLX MICROSCOPIC     Collection Time: 12/31/19  8:12 AM   Result Value Ref Range     Color YELLOW/STRAW      Appearance CLEAR CLEAR       Specific gravity 1.015      pH (UA) 8.0 5.0 - 8.0       Protein NEGATIVE NEG mg/dL     Glucose >1,000 (A) NEG mg/dL     Ketone 15 (A) NEG mg/dL     Bilirubin NEGATIVE NEG       Blood NEGATIVE NEG       Urobilinogen 1.0 0.2 - 1.0 EU/dL     Nitrites NEGATIVE NEG       Leukocyte Esterase NEGATIVE NEG     URINE CULTURE HOLD SAMPLE     Collection Time: 12/31/19  8:12 AM   Result Value Ref Range     Urine culture hold          URINE ON HOLD IN MICROBIOLOGY DEPT FOR 3 DAYS. IF UNPRESERVED URINE IS SUBMITTED, IT CANNOT BE USED FOR ADDITIONAL TESTING AFTER 24 HRS, RECOLLECTION WILL BE REQUIRED.    GLUCOSE, POC     Collection Time: 12/31/19 11:08 AM   Result Value Ref Range     Glucose (POC) 124 (H) 65 - 100 mg/dL     Performed by Areli Chopra     GLUCOSE, POC     Collection Time: 12/31/19  4:17 PM   Result Value Ref Range     Glucose (POC) 108 (H) 65 - 100 mg/dL     Performed by Lev De La Rosa     GLUCOSE, POC     Collection Time: 12/31/19  9:25 PM   Result Value Ref Range     Glucose (POC) 94 65 - 100 mg/dL     Performed by [address] [place] [address]       CT of abdomen pelvis-IMPRESSION   IMPRESSION:   1.  Ongoing peripancreatic inflammation which has increased in interval   consistent with acute pancreatitis. No definite necrosis or pseudocyst formation   is present.       2.  Status post Whipple      Medications,   HYDROmorphone (PF) (DILAUDID) injection 0.5 mg    Dose: 0.5 mg   Freq: EVERY 3 HOURS AS NEEDED Route: IV      ondansetron (ZOFRAN) injection 4 mg    Dose: 4 mg   Freq: EVERY 4 HOURS AS NEEDED Route: IV   PRN Reason: Nausea or Vomiting   Start: 12/31/19 0939      oxyCODONE-acetaminophen (PERCOCET) 5-325 mg per tablet 1 Tab    Dose: 1 Tab   Freq: EVERY 4 HOURS AS NEEDED Route: PO      dicyclomine (BENTYL) 10 mg/mL injection 20 mg    Dose: 20 mg   Freq: NOW Route: IM   Start: 12/31/19 0734 End: 12/31/19 0800      HYDROmorphone (PF) (DILAUDID) injection 1 mg    Dose: 1 mg   Freq: NOW Route: IV      sodium chloride 0.9 % bolus infusion 1,000 mL    Dose: 1,000 mL   Freq: ONCE Route: IV      0.9% sodium chloride infusion    Rate: 75 mL/hr Dose: 75 mL/hr   Freq: CONTINUOUS Route: IV   Start: 12/31/19 0918 End: 01/01/20 1442      morphine injection 2 mg    Dose: 2 mg   Freq: EVERY 4 HOURS AS NEEDED Route: IV          Assessment:       Active Problems:     Acute pancreatitis (8/16/2018)         Abdominal pain (12/31/2019)               Plan:       1. Acute and recurrent pancreatitis   Clear liq diet, IVFluids, pain control with IV meds, IV antiemetics   Consult GI- probable plans for ERCP       2. CV- HTN- resume ramapril   3. DM- hold PO home meds, accuchecks and ss insulin   4. BPH- flomax   5. GERD- omprazole   6.  Full Code
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
## 2020-01-13 NOTE — PROGRESS NOTES
[facility] History and Physical    Chief Complaint: Nausea and vomiting    History of Present Illness:      Jesse Hahn is a 62 y.o. male who is known to me for a history of distal cholangiocarcinoma. He is s/p a pylorus preserving whipple procedure on 10/6/17, final pathology [id number]. He completed adjuvant therapy after this. He has had issues with an anastomotic G-J stricture and more recently has had recurrent pancreatitis. He had an ERP with pancreatic stent placement and dilation of the pancreaticojejunostomy earlier this week. He has felt well since that time. He does have ongoing issues with bilious nausea and vomiting that have been ongoing ever since his whipple procedure. He states he is still vomiting most days, usually once per day and never solid, particulate emesis but always bilious liquid. He has also had a question of increasing soft tissue density around his SMA that is concerning for possible recurrence. Past Medical History:   Diagnosis Date    Arrhythmia     Previous a.fib, ablation, NSR now; NO LONGER FOLLOWED BY CARDIOLOGIST.     Autoimmune disease ([address]     SJOGREN'S    Cancer ([address]     COMMON BILE DUCT, ADENOCARCINOMA    Diabetes ([address]     Family history of skin cancer     Hypertension     Sepsis ([address] 2017    STENTING TO BILE DUCT    Status post chemotherapy     Stroke [facility]) 2008    brain aneurysm - no deficits    Sun-damaged skin     Sunburn, blistering        Past Surgical History:   Procedure Laterality Date    ABDOMEN SURGERY PROC UNLISTED  10/2017    Whipple     HX GI      COLONOSCOPY, POLYPS (BENIGN)    HX GI  10/06/2017    Viktor Elmore [facility] [place]     [address]  2005    Ablation     HX ORTHOPAEDIC  2000    Spinal fusion W/ HARDWARE    HX OTHER SURGICAL  11/07/2017    [place] Cath, Dr. Leiva Ast  2017    PORTACATH - then removed    NEUROLOGICAL PROCEDURE UNLISTED  2005    [place] hole washout from cerebral hemorrhage       Social History     Socioeconomic History    Marital status:      Spouse name: Not on file    Number of children: Not on file    Years of education: Not on file    Highest education level: Not on file   Occupational History    Not on file   Social Needs    Financial resource strain: Not on file    Food insecurity:     Worry: Not on file     Inability: Not on file    Transportation needs:     Medical: Not on file     Non-medical: Not on file   Tobacco Use    Smoking status: Never Smoker    Smokeless tobacco: Never Used   Substance and Sexual Activity    Alcohol use: Never     Frequency: Never     Comment: very rare    Drug use: No    Sexual activity: Yes     Partners: Female   Lifestyle    Physical activity:     Days per week: Not on file     Minutes per session: Not on file    Stress: Not on file   Relationships    Social connections:     Talks on phone: Not on file     Gets together: Not on file     Attends Rastafari service: Not on file     Active member of club or organization: Not on file     Attends meetings of clubs or organizations: Not on file     Relationship status: Not on file    Intimate partner violence:     Fear of current or ex partner: Not on file     Emotionally abused: Not on file     Physically abused: Not on file     Forced sexual activity: Not on file   Other Topics Concern    Not on file   Social History Narrative    Not on file       Family History   Problem Relation Age of Onset    Cancer Father         Breast and Colon    Cancer Mother         LEUKEMIA    No Known Problems Sister     No Known Problems Brother     No Known Problems Sister     Anesth Problems Neg Hx          Current Outpatient Medications:     acetaminophen (TYLENOL) 325 mg tablet, Take 2 Tabs by mouth every four (4) hours as needed for Pain or Fever (Over the counter medication). , Disp: 1 Tab, Rfl: 0    traMADol (ULTRAM) 50 mg tablet, Take 50 mg by mouth every six (6) hours as needed for Pain., Disp: , Rfl:     metFORMIN ER (GLUCOPHAGE XR) 500 mg tablet, Take 1 Tab by mouth daily. , Disp: 180 Tab, Rfl: 3    sucralfate (CARAFATE) 1 gram tablet, Take 1 g by mouth four (4) times daily. , Disp: , Rfl:     ondansetron (ZOFRAN ODT) 4 mg disintegrating tablet, Take 1 Tab by mouth every eight (8) hours as needed for Nausea., Disp: 20 Tab, Rfl: 0    omeprazole (PRILOSEC) 40 mg capsule, Take 1 Cap by mouth daily. , Disp: 30 Cap, Rfl: 0    empagliflozin (JARDIANCE) 25 mg tablet, Take 1 Tab by mouth daily. , Disp: 90 Tab, Rfl: 3    sildenafil citrate (VIAGRA) 50 mg tablet, Take 1 Tab by mouth as needed (ED)., Disp: 30 Tab, Rfl: 1    tamsulosin (FLOMAX) 0.4 mg capsule, TAKE ONE CAPSULE BY MOUTH EVERY EVENING, Disp: , Rfl: 0    ramipril (ALTACE) 10 mg capsule, Take 1 Cap by mouth nightly., Disp: 90 Cap, Rfl: 3    cyanocobalamin (VITAMIN B12) 1,000 mcg/mL injection, INJECT CONTENTS OF ONE VIAL INTRAMUSCULARLY EVERY OTHER WEEK, Disp: 6 mL, Rfl: 6    alpha-D-galactosidase (BEANO PO), Take 1 Tab by mouth two (2) times a day., Disp: , Rfl:     cetirizine (ZYRTEC) 10 mg tablet, Take 10 mg by mouth nightly., Disp: , Rfl:     Allergies   Allergen Reactions    Shellfish Derived Anaphylaxis     Soft shell crabs    Morphine Nausea and Vomiting    Pcn [Penicillins] Other (comments)     Pt stated \"always been told PCN\"  Pt tolerated other cephalosporins in the past       ROS   Constitutional: negative  Ears, Nose, Mouth, Throat, and Face: negative  Respiratory: negative  Cardiovascular: negative  Gastrointestinal: See HPI  Genitourinary:negative  Integument/Breast: negative  Hematologic/Lymphatic: negative  Behavioral/Psychiatric: negative  Allergic/Immunologic: negative      Physical Exam:     Visit Vitals  /77 (BP 1 Location: Left arm, BP Patient Position: Sitting)   Pulse 80   Temp 98.2 °F (36.8 °C) (Oral)   Resp 17   Ht 5' 8\" (1.727 m)   Wt 161 lb (73 kg)   SpO2 95%   BMI 24.48 kg/m² General - alert and oriented, no apparent distress  HEENT - NC/AT. No scleral icterus  Pulm - CTAB, normal inspiratory effort  CV - RRR, no M/R/G  Abd - soft, NT, ND. No masses or organomegaly. Ext - warm, well perfused, no edema  Lymphatics - no cervical, supraclavicular or inguinal adenopathy noted. Skin - supple, no rashes  Psychiatric - normal affect, good mood    Labs  Results for Israel Colon (MRN [id number]) as of 1/12/2020 19:43   Ref. Range 1/2/2020 03:24   WBC Latest Ref Range: 4.1 - 11.1 K/uL 6.1   NRBC Latest Ref Range: 0  WBC 0.0   RBC Latest Ref Range: 4.10 - 5.70 M/uL 4.48   HGB Latest Ref Range: 12.1 - 17.0 g/dL 13.4   HCT Latest Ref Range: 36.6 - 50.3 % 40.7   MCV Latest Ref Range: 80.0 - 99.0 FL 90.8   MCH Latest Ref Range: 26.0 - 34.0 PG 29.9   MCHC Latest Ref Range: 30.0 - 36.5 g/dL 32.9   RDW Latest Ref Range: 11.5 - 14.5 % 14.0   PLATELET Latest Ref Range: 150 - 400 K/uL 113 (L)   Results for Israel Colon (MRN [id number]) as of 1/12/2020 19:43   Ref.  Range 1/2/2020 03:24   Sodium Latest Ref Range: 136 - 145 mmol/L 142   Potassium Latest Ref Range: 3.5 - 5.1 mmol/L 3.4 (L)   Chloride Latest Ref Range: 97 - 108 mmol/L 111 (H)   CO2 Latest Ref Range: 21 - 32 mmol/L 25   Anion gap Latest Ref Range: 5 - 15 mmol/L 6   Glucose Latest Ref Range: 65 - 100 mg/dL 123 (H)   BUN Latest Ref Range: 6 - 20 MG/DL 8   Creatinine Latest Ref Range: 0.70 - 1.30 MG/DL 0.76   BUN/Creatinine ratio Latest Ref Range: 12 - 20   11 (L)   Calcium Latest Ref Range: 8.5 - 10.1 MG/DL 8.5   GFR est non-AA Latest Ref Range: >60 ml/min/1.73m2 >60   GFR est AA Latest Ref Range: >60 ml/min/1.73m2 >60   Bilirubin, total Latest Ref Range: 0.2 - 1.0 MG/DL 1.7 (H)   Protein, total Latest Ref Range: 6.4 - 8.2 g/dL 5.7 (L)   Albumin Latest Ref Range: 3.5 - 5.0 g/dL 3.0 (L)   Globulin Latest Ref Range: 2.0 - 4.0 g/dL 2.7   A-G Ratio Latest Ref Range: 1.1 - 2.2   1.1   ALT (SGPT) Latest Ref Range: 12 - 78 U/L 28 AST Latest Ref Range: 15 - 37 U/L 12 (L)   Alk. phosphatase Latest Ref Range: 45 - 117 U/L 106   Lipase Latest Ref Range: 73 - 393 U/L 816 (H)       Imaging  12/31 CT A/P:   FINDINGS:   LUNG BASES: Patchy right basilar atelectasis  INCIDENTALLY IMAGED HEART AND MEDIASTINUM: Unremarkable. LIVER: Pneumobilia  GALLBLADDER: Cholecystectomy  SPLEEN: No mass. PANCREAS: Status post distal gastrectomy had pancreatectomy. Resection of the  duodenum with a: None. Jejunostomy and presumably a pancreaticojejunostomy. Pancreatic ductal dilatation is unchanged. Diffuse peripancreatic inflammation  is noted increased in interval.  ADRENALS: Unremarkable. KIDNEYS: No mass, calculus, or hydronephrosis. STOMACH: Unremarkable. SMALL BOWEL: Unremarkable  COLON: Moderate stool  APPENDIX: Unremarkable  PERITONEUM: No ascites or pneumoperitoneum. RETROPERITONEUM: Mild atherosclerotic disease in the aorta. REPRODUCTIVE ORGANS: Unremarkable  URINARY BLADDER: No mass or calculus. BONES: No destructive bone lesion. ADDITIONAL COMMENTS: N/A     IMPRESSION  IMPRESSION:  1. Ongoing peripancreatic inflammation which has increased in interval  consistent with acute pancreatitis. No definite necrosis or pseudocyst formation  is present.     2.  Status post Whipple    I have reviewed and agree with all of the pertinent images    Assessment:     Sushil Shaffer is a 62 y.o. male with history of a pylorus preserving whipple procedure on 10/6/17, final pathology [id number]. He completed adjuvant therapy after this. He has had issues with an anastomotic G-J stricture and more recently has had recurrent pancreatitis. He had an ERP with pancreatic stent placement and dilation of the pancreaticojejunostomy earlier this week. Recommendations:     1. I have recommended a UGI with SBFT to ensure there is not a distal partial obstruction leading to his bilious emesis.   I do not think we can convert him to a eren-en Y G-J currently as this would prevent access for any future ERP options for his P-J stricture. A Stevens entero-enterostomy may be an option however. I would like to get a PET scan to better assess for possible recurrence to the retroperitoneum though before considering surgery. I will plan to see him back after these 2 studies are completed. 40 mins of time was spent with the patient of which > 50% of the time involved face-to-face counseling of the patient regarding the proposed treatment plan.       Maribel Irene MD  1/9/2020    CC: Alex Evans

## 2020-01-14 ENCOUNTER — PATIENT OUTREACH (OUTPATIENT)
Dept: OTHER | Age: 65
End: 2020-01-14

## 2020-01-14 NOTE — PROGRESS NOTES
Attempt to reach patient for follow up. Discreet VM left with contact information. Patient had follow up appointment with his surgeon on 1/9:   Dr. Issa Zazueta notes: I have recommended a UGI with SBFT to ensure there is not a distal partial obstruction leading to his bilious emesis. I do not think we can convert him to a eren-en Y G-J currently as this would prevent access for any future ERP options for his P-J stricture. A Stevens entero-enterostomy may be an option however. I would like to get a PET scan to better assess for possible recurrence to the retroperitoneum though before considering surgery. I will plan to see him back after these 2 studies are completed. I will follow up with the patient on 1/21 if I don't hear back from him.

## 2020-01-16 ENCOUNTER — HOSPITAL ENCOUNTER (OUTPATIENT)
Dept: GENERAL RADIOLOGY | Age: 65
Discharge: HOME | End: 2020-01-16
Attending: SURGERY
Payer: COMMERCIAL

## 2020-01-16 DIAGNOSIS — R11.10 VOMITING, INTRACTABILITY OF VOMITING NOT SPECIFIED, PRESENCE OF NAUSEA NOT SPECIFIED, UNSPECIFIED VOMITING TYPE: ICD-10-CM

## 2020-01-16 DIAGNOSIS — C22.1 CHOLANGIOCARCINOMA OF BILIARY TRACT (HCC): ICD-10-CM

## 2020-01-16 PROCEDURE — 74250 X-RAY XM SM INT 1CNTRST STD: CPT

## 2020-01-17 ENCOUNTER — OFFICE VISIT (OUTPATIENT)
Dept: INTERNAL MEDICINE CLINIC | Age: 65
End: 2020-01-17

## 2020-01-17 VITALS
HEIGHT: 68 IN | OXYGEN SATURATION: 96 % | RESPIRATION RATE: 16 BRPM | DIASTOLIC BLOOD PRESSURE: 70 MMHG | HEART RATE: 88 BPM | TEMPERATURE: 97.8 F | BODY MASS INDEX: 24.83 KG/M2 | SYSTOLIC BLOOD PRESSURE: 116 MMHG | WEIGHT: 163.8 LBS

## 2020-01-17 DIAGNOSIS — C24.9 CHOLANGIOCARCINOMA DETERMINED BY BIOPSY OF BILIARY TRACT (HCC): ICD-10-CM

## 2020-01-17 DIAGNOSIS — N40.1 BENIGN PROSTATIC HYPERPLASIA WITH NOCTURIA: ICD-10-CM

## 2020-01-17 DIAGNOSIS — I10 ESSENTIAL HYPERTENSION: ICD-10-CM

## 2020-01-17 DIAGNOSIS — R35.1 BENIGN PROSTATIC HYPERPLASIA WITH NOCTURIA: ICD-10-CM

## 2020-01-17 DIAGNOSIS — K85.90 RECURRENT PANCREATITIS: Primary | ICD-10-CM

## 2020-01-17 DIAGNOSIS — E11.9 CONTROLLED TYPE 2 DIABETES MELLITUS WITHOUT COMPLICATION, WITHOUT LONG-TERM CURRENT USE OF INSULIN (HCC): ICD-10-CM

## 2020-01-17 DIAGNOSIS — R10.13 EPIGASTRIC PAIN: ICD-10-CM

## 2020-01-17 RX ORDER — TRAMADOL HYDROCHLORIDE 50 MG/1
50 TABLET ORAL
Qty: 60 TAB | Refills: 0 | Status: SHIPPED | OUTPATIENT
Start: 2020-01-17 | End: 2020-02-12 | Stop reason: SDUPTHER

## 2020-01-17 NOTE — PROGRESS NOTES
Reviewed record in preparation for visit and have obtained necessary documentation. Identified pt with two pt identifiers(name and ). Chief Complaint   Patient presents with   [facility] Follow Up     acute pancreatitis       Health Maintenance Due   Topic Date Due    EYE EXAM RETINAL OR DILATED  1966    LIPID PANEL Q1  10/30/2019       Mr. Ivana Hernadez has a reminder for a \"due or due soon\" health maintenance. I have asked that he discuss health maintenance topic(s) due with His  primary care provider. Coordination of Care Questionnaire:  :     1) Have you been to an emergency room, urgent care clinic since your last visit? no   Hospitalized since your last visit? yes             2) Have you seen or consulted any other health care providers outside of [address] [place] [address] since your last visit? no  (Include any pap smears or colon screenings in this section.)    3) Do you have an Advance Directive on file? no    4) Are you interested in receiving information on Advance Directives? NO    Patient is accompanied by self I have received verbal consent from Annabelle Khan to discuss any/all medical information while they are present in the room.

## 2020-01-17 NOTE — PROGRESS NOTES
Len Ruff is a 62 y.o. male who presents for evaluation of hospital follow up. Last seen by me dec 2, 2019. Had another bout of pancreatitis, was inpt [facility] from dec 31-jan 2, 2020. He then had pancreatic duct stent placed jan 6, which has helped with some of his upper abd pain, but not his lower abd pains. Still has daily loose stools as well. No f/c. Not much appetite. Weight down 7 lbs. ROS:  Constitutional: negative for fevers, chills, anorexia and weight loss  Eyes:   negative for visual disturbance and irritation  ENT:   negative for tinnitus,sore throat,nasal congestion,ear pain,hoarseness  Respiratory:  negative for cough, hemoptysis, dyspnea,wheezing  CV:   negative for chest pain, palpitations, lower extremity edema  GI:   negative for nausea, vomiting,  melena. ++abd pain and diarrhea  Genitourinary: negative for frequency, dysuria and hematuria  Musculoskel: negative for myalgias, arthralgias, back pain, muscle weakness, joint pain  Neurological:  negative for headaches, dizziness, focal weakness, numbness  Psychiatric:     Negative for depression or anxiety      Past Medical History:   Diagnosis Date    Arrhythmia     Previous a.fib, ablation, NSR now; NO LONGER FOLLOWED BY CARDIOLOGIST.     Autoimmune disease ([address]     SJOGREN'S    Cancer ([address]     COMMON BILE DUCT, ADENOCARCINOMA    Diabetes ([address]     Family history of skin cancer     Hypertension     Sepsis ([address] 2017    STENTING TO BILE DUCT    Status post chemotherapy     Stroke [facility]) 2008    brain aneurysm - no deficits    Sun-damaged skin     Sunburn, blistering        Past Surgical History:   Procedure Laterality Date    ABDOMEN SURGERY PROC UNLISTED  10/2017    [place]     HX GI      COLONOSCOPY, POLYPS (BENIGN)    HX GI  10/06/2017    Viktor Rendon [facility]     [facility]    Ablation     HX ORTHOPAEDIC  2000    Spinal fusion W/ HARDWARE    HX OTHER SURGICAL  11/07/2017    [facility]     HX VASCULAR ACCESS  2017    PORTACATH - then removed    NEUROLOGICAL PROCEDURE UNLISTED  2005    Ting hole washout from cerebral hemorrhage       Family History   Problem Relation Age of Onset    Cancer Father         Breast and Colon    Cancer Mother         LEUKEMIA    No Known Problems Sister     No Known Problems Brother     No Known Problems Sister     Anesth Problems Neg Hx        Social History     Socioeconomic History    Marital status:      Spouse name: Not on file    Number of children: Not on file    Years of education: Not on file    Highest education level: Not on file   Occupational History    Not on file   Social Needs    Financial resource strain: Not on file    Food insecurity:     Worry: Not on file     Inability: Not on file    Transportation needs:     Medical: Not on file     Non-medical: Not on file   Tobacco Use    Smoking status: Never Smoker    Smokeless tobacco: Never Used   Substance and Sexual Activity    Alcohol use: Never     Frequency: Never     Comment: very rare    Drug use: No    Sexual activity: Yes     Partners: Female   Lifestyle    Physical activity:     Days per week: Not on file     Minutes per session: Not on file    Stress: Not on file   Relationships    Social connections:     Talks on phone: Not on file     Gets together: Not on file     Attends Confucianist service: Not on file     Active member of club or organization: Not on file     Attends meetings of clubs or organizations: Not on file     Relationship status: Not on file    Intimate partner violence:     Fear of current or ex partner: Not on file     Emotionally abused: Not on file     Physically abused: Not on file     Forced sexual activity: Not on file   Other Topics Concern    Not on file   Social History Narrative    Not on file            Visit Vitals  /70 (BP 1 Location: Right arm, BP Patient Position: Sitting)   Pulse 88   Temp 97.8 °F (36.6 °C) (Oral) Resp 16   Ht 5' 8\" (1.727 m)   Wt 163 lb 12.8 oz (74.3 kg)   SpO2 96%   BMI 24.91 kg/m²       Physical Examination:   General - Well appearing male  HEENT - PERRL, TM no erythema/opacification, normal nasal turbinates, no oropharyngeal erythema or exudate, MMM  Neck - supple, no bruits, no thyroidomegaly, no lymphadenopathy  Pulm - clear to auscultation bilaterally  Cardio - RRR, normal S1 S2, no murmur  Abd - soft, no masses, no HSM.  ++mildly tender throughout abd, no guard, no rebound  Extrem - no edema, +2 distal pulses  Neuro-  No focal deficits, CN intact     Assessment/Plan:    1. Diarrhea with weight loss--stop glucophage, see if that helps. 2.  Dm, type 2--last a1c 6.4. Continue jardiance. Stop glucophage as above  3. Recurrent pancreatitis--had stent placed in pancreatic duct earlier this month, with plans to exchange out for larger one on April 1. Dr Divya Senior  4. bph--continue flomax  5.  htn--controlled with altace  6. Cholangiocarcinoma--sp whipple procedure. Has had lots of recurrent bouts of pancreatitis since. Considering getting 2nd opinion at [facility], as they have a specialist who focuses on complications sp whipple  7. Possible retroperitoneal mass--has PET scan for next week, but last CT scan did not make any mention of this 'mass'. Hoping it is scar tissue.   8.  pafib--remains nsr, sp ablation    rtc 3 months, follow sugars/diarrhea off [address] Patricia III, DO

## 2020-01-17 NOTE — PATIENT INSTRUCTIONS

## 2020-01-21 ENCOUNTER — HOSPITAL ENCOUNTER (OUTPATIENT)
Dept: PET IMAGING | Age: 65
Discharge: HOME | End: 2020-01-21
Attending: SURGERY
Payer: COMMERCIAL

## 2020-01-21 VITALS — WEIGHT: 160 LBS | BODY MASS INDEX: 24.25 KG/M2 | HEIGHT: 68 IN

## 2020-01-21 DIAGNOSIS — C22.1 CHOLANGIOCARCINOMA OF BILIARY TRACT (HCC): ICD-10-CM

## 2020-01-21 PROCEDURE — A9552 F18 FDG: HCPCS

## 2020-01-21 RX ORDER — SODIUM CHLORIDE 0.9 % (FLUSH) 0.9 %
10 SYRINGE (ML) INJECTION
Status: COMPLETED | OUTPATIENT
Start: 2020-01-21 | End: 2020-01-21

## 2020-01-21 RX ADMIN — Medication 10 ML: at 06:48

## 2020-01-22 ENCOUNTER — TELEPHONE (OUTPATIENT)
Dept: SURGERY | Age: 65
End: 2020-01-22

## 2020-01-22 NOTE — TELEPHONE ENCOUNTER
Two patient identifiers used. Called patient to schedule him to see Dr. Erica Shields to review PET results.   Patient scheduled for 01/23/2020 @ 01:20pm.

## 2020-01-23 ENCOUNTER — OFFICE VISIT (OUTPATIENT)
Dept: SURGERY | Age: 65
End: 2020-01-23

## 2020-01-23 VITALS
HEART RATE: 83 BPM | OXYGEN SATURATION: 94 % | DIASTOLIC BLOOD PRESSURE: 77 MMHG | TEMPERATURE: 98.3 F | BODY MASS INDEX: 24.89 KG/M2 | HEIGHT: 68 IN | WEIGHT: 164.2 LBS | SYSTOLIC BLOOD PRESSURE: 121 MMHG

## 2020-01-23 DIAGNOSIS — C22.1 CHOLANGIOCARCINOMA OF BILIARY TRACT (HCC): Primary | ICD-10-CM

## 2020-01-23 NOTE — LETTER
1/23/20 Patient: Pollo Walters YOB: 1955 Date of Visit: 1/23/2020 Professor Carine Lester DO 
[address] [place] [address] VIA In Basket Dear Professor Carine Lester DO, Thank you for referring Mr. Farhana Coulter to [facility] for evaluation. My notes for this consultation are attached. If you have questions, please do not hesitate to call me. I look forward to following your patient along with you.  
 
 
Sincerely, 
 
Nicole Torres MD

## 2020-01-23 NOTE — PROGRESS NOTES
1. Have you been to the ER, urgent care clinic since your last visit? Hospitalized since your last visit? No    2. Have you seen or consulted any other health care providers outside of the [address] [place] [address] since your last visit? Include any pap smears or colon screening.  No

## 2020-01-23 NOTE — PROGRESS NOTES
[facility]      Clinic Note - Follow up    Farzaneh returns for scheduled follow up today. 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 He is s/p a pylorus preserving whipple procedure on 10/6/17, final pathology [id number]. He completed adjuvant therapy after this. He has had issues with an anastomotic G-J stricture and more recently has had recurrent pancreatitis. He had an ERP with pancreatic stent placement. He has been doing well from a pancreatitis standpoint since the stent was placed. He continues to have daily episodes of bilious emesis most days. He also has an area of soft tissue growth along the SMA concerning for recurrence malignant disease. He has had a PET/CT as well as and UGI since our last visit. The UGI showed no signs of distal small bowel obstruction to explain the emesis. His PET scan did show some increased avidity in the soft tissue along the SMA. He does complain of some back pain and intermitted abdominal aching pain that is improved with PO toradol but is often associated with episodes of watery diarrhea. No other new symptoms. Objective     Visit Vitals  /77   Pulse 83   Temp 98.3 °F (36.8 °C)   Ht 5' 8\" (1.727 m)   Wt 164 lb 3.2 oz (74.5 kg)   SpO2 94%   BMI 24.97 kg/m²         PE  GEN - Awake, alert, communicating appropriately. NAD  Pulm - CTAB  CV - RRR  Abd - soft, NT, ND. Ext - warm, well perfused, no edema. All other systems negative unless indicated above. Labs  None      Imaging  PET Results (most recent):  Results from Hospital Encounter encounter on 01/21/20   PET/CT TUMOR IMAGE SKULL THIGH (SUB)    Narrative PET/CT SCAN    PROCEDURE: Following IV injection of 10.7 mCi 18 Fluoro 2 deoxyglucose (FDG) and  a standard uptake delay, PET imaging is performed from the skull vertex to mid  thigh and axial, sagittal and coronal images were acquired.  Unenhanced CT is  obtained for anatomic localization, and attenuation correction of the PET scan. Patient preprocedure blood glucose level: 99mg/dL. CORRELATIVE IMAGING STUDIES: Abdomen pelvis CT 12/31/2019. PRIOR PET: None    HISTORY: The study is requested for restaging cholangiocarcinoma. Status post  Whipple. FINDINGS:    HEAD/NECK: No apparent foci of abnormal hypermetabolism. Cerebral evaluation is  limited by normal intense activity. CHEST: No foci of abnormal hypermetabolism. ABDOMEN/PELVIS: There is increased tracer activity corresponding to the left  para-aortic soft tissue density, maximum SUV is 4.1. SKELETON: No foci of abnormal hypermetabolism in the axial and visualized  appendicular skeleton. Impression IMPRESSION: Increased tracer activity corresponds to the left para-aortic soft  tissue density and is concerning for neoplasm. There is otherwise normal  physiologic tracer distribution. 1/16/20: UGI with small bowel follow through: IMPRESSION:  Filling defect of the gastrojejunal anastomosis as discussed. No gastric outlet  obstruction. No small bowel obstruction. Assessment     Jaden Zabala is a 62 y. o.yr old male known to me for a history of distal cholangiocarcinoma. He is s/p a pylorus preserving whipple procedure on 10/6/17, final pathology [id number]. He completed adjuvant therapy after this. He has had issues with an anastomotic G-J stricture and more recently has had recurrent pancreatitis. He had an ERP with pancreatic stent placement. He has been doing well from a pancreatitis standpoint since the stent was placed. He continues to have daily episodes of bilious emesis most days. He also has an area of soft tissue growth along the SMA concerning for recurrence malignant disease. Plan     I will set him up for CT guided biopsy of the para-aortic soft tissue that is concerning for recurrent disease.   We discussed that if that is positive, he would likely need additional systemic therapy and could possibly be considered for radiation to that area. If negative, I would plan to follow with another CT scan in 3 months. In regards to the emesis, he has plans for repeat ERP with stent exchange in early April. If his symptoms have not improved by that time and he has no signs of recurrent malignancy, it would be reasonable to consider a Stevens entero-enterostomy to help minimize potential for bile reflux into the stomach. I will call him with the results of his biopsy to discuss next steps. 25 mins of time was spent with the patient of which > 50% of the time involved face-to-face counseling of the patient regarding the proposed treatment plan.       Talat Archer MD  1/23/2020    CC: Darrel Haines

## 2020-01-30 ENCOUNTER — PATIENT OUTREACH (OUTPATIENT)
Dept: OTHER | Age: 65
End: 2020-01-30

## 2020-01-30 NOTE — PROGRESS NOTES
[facility] follow up      Patient with Inpatient stay   for recurring Abd pain with personal history of cholangiocarcinoma s/p pylorus-preserving Whipple 10/2017, HTN, DM, BPH and recurrent pancreatitis. Chart review:  Upcoming apts 2/3 for biopsy; DM management. Contacted patient for transitions of care services. Verified  and address for HIPAA security. * Mr. Blanche Trivedi reports that his nutritional intake has been impacted by continued daily bouts of vomiting.     - He reports reduced muscle in extremities; and thinks he is loosing weight.   (Weights recorded in  160-164 pounds; Down from 2019 176 pounds; and May 2019 187)     - MD is preparing for revision of surgical procedure to allow for reduced bile and redirection of ducts to address continued N/V.      - CM offers nutritional support coupons for ensure. Patient would be happy to receive these. - Pending findings, he may be open to telephonic dietician support. - CM advises to treat ensure like foundation for milkshake; Add ice cream; peanut butter; any blended taste he likes that would make it more palatable. - CM advises to monitor BS carefully. (Pt reports range of 100-130 fasting)  Due for A1C.  9/3/2019 6.3  * He reports that his doctor does not believe the CT findings of increased activity on PET scan is conclusive of more cancer. Biopsy will determine future treatment on Monday. * Medication changes:   None. * FU physician visits:  Upcoming biopsy; FU for DM;  Reassess for future surgery. * Changes in treatment. -  Assessment by biopsy will effect any future treatments. * Patient education / resources :  CM will mail Ensure coupons;  Suggested adding ice cream; peanut butter; protein powder to alter for taste. * Patient concerns;  N/V every day with weight loss. * CM concerns:  Weight loss;  Preparing for potential surgical procedure.       Advanced Directive:  Patient has been given ACP materials on multiple occasions. CM reminds patient to review, sign and take a copy to apt next week. Will follow for [facility] services. Spoke to Patient re: future CM - he is appreciative of the support and is open to CCM upon closure of [facility] next week.

## 2020-02-03 ENCOUNTER — HOSPITAL ENCOUNTER (OUTPATIENT)
Dept: CT IMAGING | Age: 65
Discharge: HOME | End: 2020-02-03
Attending: SURGERY
Payer: COMMERCIAL

## 2020-02-03 VITALS
BODY MASS INDEX: 23.95 KG/M2 | DIASTOLIC BLOOD PRESSURE: 69 MMHG | OXYGEN SATURATION: 98 % | RESPIRATION RATE: 20 BRPM | WEIGHT: 158 LBS | HEART RATE: 78 BPM | HEIGHT: 68 IN | SYSTOLIC BLOOD PRESSURE: 125 MMHG | TEMPERATURE: 98 F

## 2020-02-03 DIAGNOSIS — C22.1 CHOLANGIOCARCINOMA OF BILIARY TRACT (HCC): ICD-10-CM

## 2020-02-03 PROCEDURE — 76010000093 HC SPECIAL PROCEDURE

## 2020-02-03 PROCEDURE — 74011250636 HC RX REV CODE- 250/636: Performed by: SURGERY

## 2020-02-03 PROCEDURE — 88333 PATH CONSLTJ SURG CYTO XM 1: CPT

## 2020-02-03 PROCEDURE — 88305 TISSUE EXAM BY PATHOLOGIST: CPT

## 2020-02-03 PROCEDURE — 49180 BIOPSY ABDOMINAL MASS: CPT

## 2020-02-03 PROCEDURE — 77012 CT SCAN FOR NEEDLE BIOPSY: CPT

## 2020-02-03 RX ORDER — MIDAZOLAM HYDROCHLORIDE 1 MG/ML
5 INJECTION, SOLUTION INTRAMUSCULAR; INTRAVENOUS
Status: DISCONTINUED | OUTPATIENT
Start: 2020-02-03 | End: 2020-02-03

## 2020-02-03 RX ORDER — SODIUM CHLORIDE 9 MG/ML
25 INJECTION, SOLUTION INTRAVENOUS CONTINUOUS
Status: DISCONTINUED | OUTPATIENT
Start: 2020-02-03 | End: 2020-02-03

## 2020-02-03 RX ORDER — FENTANYL CITRATE 50 UG/ML
100 INJECTION, SOLUTION INTRAMUSCULAR; INTRAVENOUS
Status: DISCONTINUED | OUTPATIENT
Start: 2020-02-03 | End: 2020-02-03

## 2020-02-03 RX ADMIN — MIDAZOLAM 2 MG: 1 INJECTION INTRAMUSCULAR; INTRAVENOUS at 11:07

## 2020-02-03 RX ADMIN — FENTANYL CITRATE 25 MCG: 50 INJECTION, SOLUTION INTRAMUSCULAR; INTRAVENOUS at 11:15

## 2020-02-03 RX ADMIN — MIDAZOLAM 2 MG: 1 INJECTION INTRAMUSCULAR; INTRAVENOUS at 11:18

## 2020-02-03 RX ADMIN — FENTANYL CITRATE 25 MCG: 50 INJECTION, SOLUTION INTRAMUSCULAR; INTRAVENOUS at 11:11

## 2020-02-03 RX ADMIN — SODIUM CHLORIDE 25 ML/HR: 900 INJECTION, SOLUTION INTRAVENOUS at 10:59

## 2020-02-03 RX ADMIN — FENTANYL CITRATE 50 MCG: 50 INJECTION, SOLUTION INTRAMUSCULAR; INTRAVENOUS at 11:08

## 2020-02-03 RX ADMIN — MIDAZOLAM 1 MG: 1 INJECTION INTRAMUSCULAR; INTRAVENOUS at 11:11

## 2020-02-03 NOTE — H&P
Radiology History and Physical    Patient: Sherlyn Devries 62 y.o. male       Chief Complaint: No chief complaint on file. History of Present Illness: Left paraaortic soft tissue mass    History:    Past Medical History:   Diagnosis Date    Arrhythmia     Previous a.fib, ablation, NSR now; NO LONGER FOLLOWED BY CARDIOLOGIST.     Autoimmune disease ([facility]     SJOGREN'S    Cancer ([facility]     COMMON BILE DUCT, ADENOCARCINOMA    Diabetes ([facility]     Family history of skin cancer     Hypertension     Sepsis ([facility] 2017    STENTING TO BILE DUCT    Status post chemotherapy     Stroke [facility]) 2008    brain aneurysm - no deficits    Sun-damaged skin     Sunburn, blistering      Family History   Problem Relation Age of Onset    Cancer Father         Breast and Colon    Cancer Mother         LEUKEMIA    No Known Problems Sister     No Known Problems Brother     No Known Problems Sister     Anesth Problems Neg Hx      Social History     Socioeconomic History    Marital status:      Spouse name: Not on file    Number of children: Not on file    Years of education: Not on file    Highest education level: Not on file   Occupational History    Not on file   Social Needs    Financial resource strain: Not on file    Food insecurity:     Worry: Not on file     Inability: Not on file    Transportation needs:     Medical: Not on file     Non-medical: Not on file   Tobacco Use    Smoking status: Never Smoker    Smokeless tobacco: Never Used   Substance and Sexual Activity    Alcohol use: Never     Frequency: Never     Comment: very rare    Drug use: No    Sexual activity: Yes     Partners: Female   Lifestyle    Physical activity:     Days per week: Not on file     Minutes per session: Not on file    Stress: Not on file   Relationships    Social connections:     Talks on phone: Not on file     Gets together: Not on file     Attends Amish service: Not on file     Active member of club or organization: Not on file     Attends meetings of clubs or organizations: Not on file     Relationship status: Not on file    Intimate partner violence:     Fear of current or ex partner: Not on file     Emotionally abused: Not on file     Physically abused: Not on file     Forced sexual activity: Not on file   Other Topics Concern    Not on file   Social History Narrative    Not on file       Allergies: Allergies   Allergen Reactions    Shellfish Derived Anaphylaxis     Soft shell crabs    Morphine Nausea and Vomiting    Pcn [Penicillins] Other (comments)     Pt stated \"always been told PCN\"  Pt tolerated other cephalosporins in the past       Current Medications:  Current Outpatient Medications   Medication Sig    traMADol (ULTRAM) 50 mg tablet Take 1 Tab by mouth every six (6) hours as needed for Pain for up to 30 days. Max Daily Amount: 200 mg.  acetaminophen (TYLENOL) 325 mg tablet Take 2 Tabs by mouth every four (4) hours as needed for Pain or Fever (Over the counter medication).  sucralfate (CARAFATE) 1 gram tablet Take 1 g by mouth four (4) times daily.  ondansetron (ZOFRAN ODT) 4 mg disintegrating tablet Take 1 Tab by mouth every eight (8) hours as needed for Nausea.  omeprazole (PRILOSEC) 40 mg capsule Take 1 Cap by mouth daily.  empagliflozin (JARDIANCE) 25 mg tablet Take 1 Tab by mouth daily.  sildenafil citrate (VIAGRA) 50 mg tablet Take 1 Tab by mouth as needed (ED).  tamsulosin (FLOMAX) 0.4 mg capsule TAKE ONE CAPSULE BY MOUTH EVERY EVENING    ramipril (ALTACE) 10 mg capsule Take 1 Cap by mouth nightly.  cyanocobalamin (VITAMIN B12) 1,000 mcg/mL injection INJECT CONTENTS OF ONE VIAL INTRAMUSCULARLY EVERY OTHER WEEK    alpha-D-galactosidase (BEANO PO) Take 1 Tab by mouth two (2) times a day.  cetirizine (ZYRTEC) 10 mg tablet Take 10 mg by mouth nightly.      Current Facility-Administered Medications   Medication Dose Route Frequency    0.9% sodium chloride infusion 25 mL/hr IntraVENous CONTINUOUS    fentaNYL citrate (PF) injection 100 mcg  100 mcg IntraVENous Multiple    midazolam (VERSED) injection 5 mg  5 mg IntraVENous Multiple        Physical Exam:  Blood pressure 113/70, pulse 75, temperature 98 °F (36.7 °C), resp. rate 20, height 5' 8\" (1.727 m), weight 71.7 kg (158 lb), SpO2 99 %. GENERAL: alert, cooperative, no distress, appears stated age  LUNG: clear to auscultation bilaterally  HEART: regular rate and rhythm  ABD: Non tender, non distended. Alerts:    Hospital Problems  Date Reviewed: 1/23/2020    None          Laboratory:    No results for input(s): HGB, HCT, WBC, PLT, INR, BUN, CREA, K, CRCLT, HGBEXT, HCTEXT, PLTEXT, INREXT in the last 72 hours. No lab exists for component: PTT, PT      Plan of Care/Planned Procedure:  Risks, benefits, and alternatives reviewed with patient and he agrees to proceed with the procedure. Deemed appropriate or moderate sedation with versed and fentanyl.         Reed Covington MD

## 2020-02-03 NOTE — ROUTINE PROCESS
Procedure reviewed with patient by Dr. Anthony Tovar. Opportunity to verbalize questions and concerns. Consent obtained.

## 2020-02-03 NOTE — DISCHARGE INSTRUCTIONS
[facility]  Special Procedures/Radiology Department    Radiologist:    Dr. Shade Syed    Date:    2/3/2020    Biopsy Discharge Instructions    You may have an aching pain in the biopsy site tonight. You may Tylenol, as directed on the label, for pain or discomfort. Avoid ibuprofen (Advil, Motrin) and aspirin for the next 48 hours as these drugs may cause you to bleed. Watch for bleeding from the biopsy site. Hold pressure to the area for at least 15 minutes if bleeding does occur. If you have severe pain or swelling at the site, go directly to the nearest Emergency Room. Watch for signs of infection:  redness, pain, pus, drainage, fever or chills. If this occurs, call your doctor. Resume your previous diet and follow the medication reconciliation form. Rest the remainder of today. Do not lift anything heavier than a small grocery bag (10 pounds) for the next 5 days. It may take up to 3 days for your test results to become available to your physician. Call your physician if you have not heard anything after 3 business day. If you have any questions or concerns, please call [phone] and ask to speak to the nurse on-call.

## 2020-02-07 ENCOUNTER — PATIENT OUTREACH (OUTPATIENT)
Dept: OTHER | Age: 65
End: 2020-02-07

## 2020-02-07 ENCOUNTER — CLINICAL SUPPORT (OUTPATIENT)
Dept: INTERNAL MEDICINE CLINIC | Age: 65
End: 2020-02-07

## 2020-02-07 DIAGNOSIS — Z23 ENCOUNTER FOR IMMUNIZATION: Primary | ICD-10-CM

## 2020-02-07 NOTE — PROGRESS NOTES
Resolving [facility]/   Begin CCM program.       Patient with Inpatient stay 12/31/19-01/02/2020  for recurring Abd pain with personal history of cholangiocarcinoma s/p pylorus-preserving Whipple 10/2017, HTN, DM, BPH and recurrent pancreatitis. [facility]  Wrap Up  Resolving current episode (Transitions of care complete). No further ED/UC or hospital admissions within 30 days post discharge. Patient attended follow-up appointments as directed. Opening for [facility]     Patient eligible for [place] [address] care management    Two patient identifiers verified. Discussed the care management program.  Patient agrees to care management services at this time. Patient's primary care provider relationship reviewed with patient and modified, as applicable. Patient has significant diagnosis of possible recurrent cholangiocarcinoma, DM, HTN. Care management assessment completed:  * Mr. Robert Kahn is aware of the biopsy results, remains hopeful that cancer has not returned. * Daily N/V continues. MD aware. - Received coupons from me for ensure. * Got his Shingrix shot today. Taking tylenol to help with symptoms, but upset stomach. \"I just couldn't keep them down. \"      Patient stated problem: \"You've been with me through this before, and I would like to have you call and support me like you have in the past.\"    Care manager identified primary concern:  recurrent cholangiocarcinoma- at risk for further emergency and IP stays; daily N/V      Emotional Status/Adjustment to Health State: Mr. Robert Kahn is     Readiness to Change: []  Pre-contemplative    []  Contemplative  [x]  Preparation               []  Action                  []  Maintenance    Barriers/Challenges to Care: []  Decline in memory    []  Language barrier     [x]  Emotional                  []  Limited mobility  []  Lack of motivation     [] Vision, hearing or cognitive impairment []  Knowledge [] Financial Barriers  []  Other     Patient stated goal / Care Manager/Provider identified medical goal Support and Resources for Oncology services. Upcoming appointments:   Future Appointments   Date Time Provider Sloane Roach   2/27/2020  9:00 AM [facility] 2 [facility]   2/28/2020  9:15 AM Tameka Burgess MD [address]   4/6/2020  8:45 AM Carl Raya   7/27/2020 11:00 AM Malathi Wolfe NP 2 [facility] for Chronic Disease: Focused Assessment-   Patient requests to defer FA due to time. 4. Key pt activities to achieve better health:   []  Weight loss  []  Improved diabetic control  []  Decreased cholesterol levels  []  Decreased blood pressure  [x]  Treatment plan options for potential treatments   []    Patient verbalized understanding of all information discussed. Pt has my contact information for any further questions, concerns, or needs. Plan for next call:  Patient would like to postpone next call until after CT planned 2/27. Planned call for 3/3 Tues. Chart Review:    Shingles shot today. Path from biopsy 2/3   * * *Comment* * *  Core biopsies show malignant glands infiltrating a fibrotic stroma with associated perineural invasion. The patient's prior cholangiocarcinoma ([id number]) is compared with the current specimen and shares morphologic similarities. These findings are most suggestive of recurrent cholangiocarcinoma, however clinical and radiologic correlation is recommended. This case was reviewed by Dr. Stephani Wall, who agrees with the diagnosis.

## 2020-02-07 NOTE — PROGRESS NOTES
After obtaining consent, and per orders of Dr. Georgette gAuilar, injection of Shingrix given by [facility]. Patient instructed to remain in clinic for 20 minutes afterwards, and to report any adverse reaction to me immediately.

## 2020-02-10 ENCOUNTER — TELEPHONE (OUTPATIENT)
Dept: SURGERY | Age: 65
End: 2020-02-10

## 2020-02-10 DIAGNOSIS — R11.2 NAUSEA AND VOMITING, INTRACTABILITY OF VOMITING NOT SPECIFIED, UNSPECIFIED VOMITING TYPE: ICD-10-CM

## 2020-02-11 ENCOUNTER — OFFICE VISIT (OUTPATIENT)
Dept: SURGERY | Age: 65
End: 2020-02-11

## 2020-02-11 VITALS
RESPIRATION RATE: 17 BRPM | TEMPERATURE: 97.4 F | HEART RATE: 87 BPM | SYSTOLIC BLOOD PRESSURE: 115 MMHG | HEIGHT: 68 IN | BODY MASS INDEX: 24.43 KG/M2 | WEIGHT: 161.2 LBS | DIASTOLIC BLOOD PRESSURE: 66 MMHG | OXYGEN SATURATION: 95 %

## 2020-02-11 DIAGNOSIS — C24.0 RECURRENT CHOLANGIOCARCINOMA OF BILE DUCT (HCC): Primary | ICD-10-CM

## 2020-02-11 NOTE — LETTER
2/11/20 Patient: Linda Jones YOB: 1955 Date of Visit: 2/11/2020 Professor Carine Lester DO 
[address] [address] VIA In Basket Dear Professor Carine Lester DO, Thank you for referring Mr. Sullivan New to [facility] for evaluation. My notes for this consultation are attached. If you have questions, please do not hesitate to call me. I look forward to following your patient along with you.  
 
 
Sincerely, 
 
Darrel Laura MD

## 2020-02-12 ENCOUNTER — HOSPITAL ENCOUNTER (OUTPATIENT)
Dept: RADIATION THERAPY | Age: 65
Discharge: HOME | End: 2020-02-12

## 2020-02-12 ENCOUNTER — DOCUMENTATION ONLY (OUTPATIENT)
Dept: SURGERY | Age: 65
End: 2020-02-12

## 2020-02-12 DIAGNOSIS — R10.13 EPIGASTRIC PAIN: ICD-10-CM

## 2020-02-12 NOTE — PROGRESS NOTES
[facility]      Clinic Note - Follow up    Farzaneh returns for scheduled follow up today. 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 He is s/p a pylorus preserving whipple procedure on 10/6/17, final pathology [id number]. He completed adjuvant therapy after this. He has had issues with an anastomotic G-J stricture and more recently has had recurrent pancreatitis. He had an ERP with pancreatic stent placement. He has been doing well from a pancreatitis standpoint since the stent was placed. He continues to have daily episodes of bilious emesis most days. He also has an area of soft tissue growth along the SMA concerning for recurrence malignant disease. This area was PET avid without additional sites of avidity noted. He has had this biopsied by CT guidance and pathology was consistent with recurrent cholangiocarcinoma. The patient denies any changes to the [facility], PSHx, SHx or FHx since their last visit except as mentioned above. ROS is negative except as mentioned in the HPI. Objective     Visit Vitals  /66 (BP 1 Location: Left arm, BP Patient Position: Sitting)   Pulse 87   Temp 97.4 °F (36.3 °C) (Oral)   Resp 17   Ht 5' 8\" (1.727 m)   Wt 161 lb 3.2 oz (73.1 kg)   SpO2 95%   BMI 24.51 kg/m²         PE  GEN - Awake, alert, communicating appropriately. NAD  Pulm - CTAB  CV - RRR  Abd - soft, NT, ND. Ext - warm, well perfused, no edema. All other systems negative unless indicated above. Labs  None      Imaging  None      Assessment     Pollo Walters is a 62 y. o.yr old male with recurrent cholangiocarcinoma. This appears to be a localized recurrence. Plan     I have reviewed his case with radiation oncology as well as medical oncology. I think it would be reasonable to consider radiation therapy to this area given the lack of distant disease on PET imaging making this consistent with a local recurrence.   I do not think the recurrent tumor is surgically resectable. Alternatively, treating this with a course of chemotherapy first, followed by radiation may also be an option. I will plan to review at tumor board and further discuss with both Oncology and Rad Onc. This was discussed with the patient. He has plans for repeat ERP with stent exchange in early April. I discussed that we could consider still a Stevens entero-enterostomy surgery to try to address his vomiting in the future if he is off of any treatment for a period of time. 30 mins of time was spent with the patient of which > 50% of the time involved face-to-face counseling of the patient regarding the proposed treatment plan.       Beatriz Ramirez MD  2/11/2020    CC: Raz Pacheco

## 2020-02-13 RX ORDER — TRAMADOL HYDROCHLORIDE 50 MG/1
50 TABLET ORAL
Qty: 60 TAB | Refills: 0 | Status: SHIPPED | OUTPATIENT
Start: 2020-02-13 | End: 2020-03-14

## 2020-02-28 ENCOUNTER — OFFICE VISIT (OUTPATIENT)
Dept: ONCOLOGY | Age: 65
End: 2020-02-28

## 2020-02-28 VITALS
HEART RATE: 88 BPM | SYSTOLIC BLOOD PRESSURE: 101 MMHG | OXYGEN SATURATION: 97 % | TEMPERATURE: 97.4 F | BODY MASS INDEX: 23.92 KG/M2 | WEIGHT: 157.8 LBS | HEIGHT: 68 IN | DIASTOLIC BLOOD PRESSURE: 66 MMHG

## 2020-02-28 DIAGNOSIS — R11.2 NAUSEA AND VOMITING, INTRACTABILITY OF VOMITING NOT SPECIFIED, UNSPECIFIED VOMITING TYPE: ICD-10-CM

## 2020-02-28 DIAGNOSIS — G89.3 CANCER RELATED PAIN: ICD-10-CM

## 2020-02-28 DIAGNOSIS — C22.1 CHOLANGIOCARCINOMA OF BILIARY TRACT (HCC): Primary | ICD-10-CM

## 2020-02-28 RX ORDER — FENTANYL 12.5 UG/1
1 PATCH TRANSDERMAL
Qty: 10 PATCH | Refills: 0 | Status: SHIPPED | OUTPATIENT
Start: 2020-02-28 | End: 2020-03-10 | Stop reason: DRUGHIGH

## 2020-02-28 NOTE — PROGRESS NOTES
[facility]  at [address] [facility], [address] [place] [address]  [place], [address] [facility]  [phone]       Follow-up Note      Patient: Annabelle Khan MRN: [id number]  SSN: [id number]    YOB: 1955  Age: 62 y.o. Sex: male      Diagnosis:     1. 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 Treatment:     1. S/P Pancreatoduodenectomy on  10/06/2017  2. Adjuvant monotherapy with Capecitabine - s/p 6 cycles   (12/4/2017 - 05/2018)    Subjective:      Annabelle Khan is a 62 y.o. male with a diagnosis of extrahepatic cholangiocarcinoma. He presented to the ED in August 2017 with obstructive jaundice. He was found to have an obstructive lesion in the dital bile duct. The CT showed marked intrahepatic biliary dilation and common bile duct dilation to the level of the mid common bile duct, which ws markedly narrowed. He underwent a stenting procedure followed by spyglass cholangiogram. The brushings revealed a diagnosis of cholangiocarcinoma. He underwent a Whipple procedure on 10/06/2017. He completed 6 cycles of adjuvant Xeloda. Mr. Ivana Hernadez continues to have daily episodes of bilious emesis almost daily. PET scan showed increased activity in the soft tissue along the SMA. CT guided biopsy showed local recurrence. He is seeing Dr. Philip Hernandez for SBRT. She will need a revision re-anastomosis of the bowel by Dr. José Wellington in the future. He is experiencing worsening back pain pain and has lost more weight. Back pain is rated at a 5-6/10 and it radiates to the back.        Review of Systems:    Constitutional: negative  Eyes: negative  Ears, Nose, Mouth, Throat, and Face: negative  Respiratory: negative  Cardiovascular: negative  Gastrointestinal: daily emesis, decreased appetite and pain  Genitourinary:negative  Integument/Breast: negative  Hematologic/Lymphatic: negative  Musculoskeletal:negative  Neurological: negative      Past Medical History:   Diagnosis Date    Arrhythmia     Previous a.fib, ablation, NSR now; NO LONGER FOLLOWED BY CARDIOLOGIST.  Autoimmune disease ([facility]     SJOGREN'S    Cancer ([facility]     COMMON BILE DUCT, ADENOCARCINOMA    Diabetes ([facility]     Family history of skin cancer     Hypertension     Sepsis ([facility] 2017    STENTING TO BILE DUCT    Status post chemotherapy     Stroke [facility]) 2008    brain aneurysm - no deficits    Sun-damaged skin     Sunburn, blistering      Past Surgical History:   Procedure Laterality Date    ABDOMEN SURGERY PROC UNLISTED  10/2017    Whipple     HX GI      COLONOSCOPY, POLYPS (BENIGN)    HX GI  10/06/2017    [facility]iktor Murrell [facility]     HX HEART CATHETERIZATION  2005    Ablation     HX ORTHOPAEDIC  2000    Spinal fusion W/ HARDWARE    HX OTHER SURGICAL  11/07/2017    [place] Cath, Dr. Caity Guerra  2017    PORTACATH - then removed    NEUROLOGICAL PROCEDURE UNLISTED  2005    Gerilyn [place] hole washout from cerebral hemorrhage      Family History   Problem Relation Age of Onset    Cancer Father         Breast and Colon    Cancer Mother         LEUKEMIA    No Known Problems Sister     No Known Problems Brother     No Known Problems Sister     Anesth Problems Neg Hx      Social History     Tobacco Use    Smoking status: Never Smoker    Smokeless tobacco: Never Used   Substance Use Topics    Alcohol use: Never     Frequency: Never     Comment: very rare      Prior to Admission medications    Medication Sig Start Date End Date Taking? Authorizing Provider   ondansetron (ZOFRAN ODT) 4 mg disintegrating tablet Take 1 Tab by mouth every eight (8) hours as needed for Nausea. 2/24/20  Yes Silas Raya III, DO   traMADol (ULTRAM) 50 mg tablet Take 1 Tab by mouth every six (6) hours as needed for Pain for up to 30 days.  Max Daily Amount: 200 mg. 2/13/20 3/14/20 Yes Silas Raya III, DO   acetaminophen (TYLENOL) 325 mg tablet Take 2 Tabs by mouth every four (4) hours as needed for Pain or Fever (Over the counter medication). 1/2/20  Yes Philip Fried NP   sucralfate (CARAFATE) 1 gram tablet Take 1 g by mouth four (4) times daily. Yes Provider, Historical   omeprazole (PRILOSEC) 40 mg capsule Take 1 Cap by mouth daily. 11/5/19  Yes Madalyn Merchant MD   empagliflozin (JARDIANCE) 25 mg tablet Take 1 Tab by mouth daily. 5/30/19  Yes Rosalba Painting MD   sildenafil citrate (VIAGRA) 50 mg tablet Take 1 Tab by mouth as needed (ED). 5/6/19  Yes Silas Raya III, DO   tamsulosin (FLOMAX) 0.4 mg capsule TAKE ONE CAPSULE BY MOUTH EVERY EVENING 2/28/19  Yes Provider, Historical   ramipril (ALTACE) 10 mg capsule Take 1 Cap by mouth nightly. 1/4/19  Yes Silas Raya III, DO   cyanocobalamin (VITAMIN B12) 1,000 mcg/mL injection INJECT CONTENTS OF ONE VIAL INTRAMUSCULARLY EVERY OTHER WEEK 9/20/18  Yes Silas Raya III, DO   alpha-D-galactosidase (BEANO PO) Take 1 Tab by mouth two (2) times a day. Yes Other, MD Flynn   cetirizine (ZYRTEC) 10 mg tablet Take 10 mg by mouth nightly. Yes Provider, Historical          Allergies   Allergen Reactions    Shellfish Derived Anaphylaxis     Soft shell crabs    Morphine Nausea and Vomiting    Pcn [Penicillins] Other (comments)     Pt stated \"always been told PCN\"  Pt tolerated other cephalosporins in the past           Objective:     Vitals:    02/28/20 0916   BP: 101/66   Pulse: 88   Temp: 97.4 °F (36.3 °C)   TempSrc: Oral   SpO2: 97%   Weight: 157 lb 12.8 oz (71.6 kg)   Height: 5' 8\" (1.727 m)         Pain Scale: 3/10 - back      Physical Exam:    GENERAL: alert, cooperative, no distress, appears stated age  EYE: negative  LYMPHATIC: Cervical, supraclavicular, and axillary nodes normal.   THROAT & NECK: normal and no erythema or exudates noted.    LUNG: clear to auscultation bilaterally  HEART: regular rate and rhythm  ABDOMEN: soft, non-tender  EXTREMITIES:  no edema  SKIN: Normal.  NEUROLOGIC: negative      CT Results (most recent):  Results from Hospital Encounter encounter on 02/03/20   CT BX GUIDED NDL    Narrative PROCEDURE: Left para-aortic soft tissue mass biopsy    ESTIMATED BLOOD LOSS: Less than 5mL    OPERATING PHYSICIAN: ESTER Garnerf: 3 20-gauge core biopsies    INDICATION:  History of cholangiocarcinoma. Left para-aortic soft tissue  abnormality    Procedure and findings: CT dose reduction was achieved through use of a  standardized protocol tailored for this examination and automatic exposure  control for dose modulation. The risks and benefits of the procedure were  discussed with the patient. Written consent was obtained. Preliminary CT imaging  of the abdomen again demonstrated soft tissue adjacent to the aorta at the level  of the left kidney. An appropriate site for biopsy was marked on the skin. The  patient was prepped and draped in maximum sterile barrier technique. 1%  lidocaine was injected locally. A small dermatotomy was made. An 19-gauge  coaxial introducer needle was then advanced into the left para-aortic region  under CT guidance. An 20-gauge core biopsy device was advanced through the  coaxial needle and 3 specimens were obtained. Adequacy was confirmed by on-site  pathology. The patient tolerated the procedure well. There were no immediate  complications. Moderate intravenous conscious sedation was supervised by Dr. Gorge Johnson. The  patient was independently monitored by a registered nurse assigned to the  Department of Radiology using automated blood pressure, EKG, and pulse oximetry. The detail conscious sedation record is stored in the hospital information  system. Medication:  Versed: 3 mg  Fentanyl: 100 mcg  Intraprocedure time: 20 minutes        Impression IMPRESSION:     Successful CT-guided biopsy of a left para-aortic mass.  There were no immediate  complications. I personally reviewed the images. Para aortic mass. Assessment:     1. Extrahepatic cholangiocarcinoma:    T3 N1 (1 of 12 LN +ve)  Invasion into pancreas  R0 resection    > S/P Pancreatoduodenectomy on  10/06/2017    Completed adjuvant treatment with single agent Capecitabine - s/p 6 cycles (12/4/2017 - 05/2018). Now with local recurrence in the para-aortic soft tissue     CT guided biopsy 2/3/2020  Of para -aortic soft tissue mass - + for adenocarcinoma    Dr. Martinez Kessler - surg/onc has been following and does not recommend surgery at this time. Per tumor board discussions at [facility], recommend proceeding with localized radiation to the soft tissue mass by Dr. Adeline Santos. No role of adjuvant chemotherapy at this time. He will undergo reanastomosis after SBRT by Dr. Tiffany Wahl. 2. Cancer related pain    He does not like taking opioids every few hours and therefore has been taking Tramadol. This is no longer working. We have recommended that he start Fentanyl 12.5 mcg every 3 days.  reviewed. Discussed proper storage of narcotics. Keep in a safe place away from children, other adults and pets. 3. Nausea and Vomiting    Has been occurring daily since his whipple procedure      Plan:       > Follow-up in 3 months  > Rx for Fentanyl patch 12.5 mcg for 30 days  > SBRT per Dr. Adeline Santos  > No adjuvant chemotherapy  > Follow up with Dr. Tiffany Wahl after radiation    I saw the patient in conjunction with MP Blank      Signed by: Isa Ro MD                     February 28, 2020      CC. Neela Vinson MD  CC. Martinez Kessler MD  CC. Orlando Wang MD  CC. Greg Hernandez MD  CC.  Leny Tran MD

## 2020-02-28 NOTE — PROGRESS NOTES
Jesse Hahn is a 62 y.o. cauc. male here for 6 month follow up for:  Chief Complaint   Patient presents with    Cancer     Extrahepatic cholangiocarcinoma:     1. Have you been to the ER, urgent care clinic since your last visit? Hospitalized since your last visit? Yes  Nov 17- 20  Abdominal pain  Dec. 4-6  Abdominal pain  Dec 31- Jan 2   Abdominal pain  *has been in hospital  5 times since last visit  Stent placed last hospital visit. 2. Have you seen or consulted any other health care providers outside of the [address] [place] [address] since your last visit? Include any pap smears or colon screening.   No

## 2020-03-03 ENCOUNTER — PATIENT OUTREACH (OUTPATIENT)
Dept: OTHER | Age: 65
End: 2020-03-03

## 2020-03-03 NOTE — PROGRESS NOTES
CM  follow up call. Patient with cholangiocarcinoma s/p pylorus-preserving Whipple 10/2017, HTN, DM, BPH and recurrent pancreatitis. * Currently with mervin aortic mass/ treatment - planned Stereotactic body radiation therapy (SBRT) with Dr. Roly Walden followed by surgery. Chart review:  CT bx;  Onc OV     Contacted  patient for CM follow up services. Verified  and address for HIPAA security. * 5 rad txs /  Every other day - late in the day. So his wife can take  him. - Reviewed how SBRT is targeted and will directly impact mass. - CM urges patient to have good skin care/  SE of burns to skin with localized tx. * Patient states that he did not receive the coupons for Ensure/  CM will mail another packet to his address/  Confirmed with demographics. CM urges patient to call her if he doesn't receive within a week. - Mailed out both Ensure and Glucerna (he is diabetic). 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      - His wife is working on her dissertation for PhD in [address] [place] [address]. Very close to graduating/  He is very proud of her. * MED CHANGES:  No med changes/ but starting SBRT for targeted Rad Onc for mervin aortic mass. * MD APTS:   5 SBRT txs then FU with Dr. Eduardo Wan for planned resection of new tumor. * Patient Concerns:  Continued weight loss/  Daily N/V.    * CM Concerns:  SE for Rad Onc/ support of patient's needs as treatment progresses. Staying healthy for pending surgery. * Education/ Resources. Advanced Directive:  Patient offered information on development of Advanced Care Planning. *CM has sent to patient in the past.   \"I keep forgetting about it. \"       Will follow for CM services.    Next planned outreach:  FU after SBRT tx  3/20            Chart Review:  Onc FU visit   Extrahepatic cholangiocarcinoma:  T3 N1 (1 of 12 LN +ve)  Invasion into pancreas  R0 resection     Now with loco-regional recurrent in the para aortic soft tissue.      Treatment:      1. S/P Pancreatoduodenectomy on  10/06/2017  2. Adjuvant monotherapy with Capecitabine - s/p 6 cycles              (12/4/2017 - 05/2018)    Mr. Richard Santana continues to have daily episodes of bilious emesis almost daily. PET scan showed increased activity in the soft tissue along the SMA. CT guided biopsy showed local recurrence. He is seeing Dr. Carlie Valle for SBRT. She will need a revision re-anastomosis of the bowel by Dr. Alanna Field in the future.      He is experiencing worsening back pain pain and has lost more weight. Back pain is rated at a 5-6/10 and it radiates to the back.       02/28/20 0916   BP: 101/66   Pulse: 88   Temp: 97.4 °F (36.3 °C)   TempSrc: Oral   SpO2: 97%   Weight: 157 lb 12.8 oz (71.6 kg)   Height: 5' 8\" (1.727 m)         Pain Scale: 3/10 - back    CT guided biopsy 2/3/2020  Of para -aortic soft tissue mass - + for adenocarcinoma     Dr. Blair Arrieta - surg/onc has been following and does not recommend surgery at this time. Per tumor board discussions at [facility], recommend proceeding with localized radiation to the soft tissue mass by Dr. Carlie Valle.      No role of adjuvant chemotherapy at this time.      He will undergo reanastomosis after SBRT by Dr. Alanna Field. He does not like taking opioids every few hours and therefore has been taking Tramadol. This is no longer working. We have recommended that he start Fentanyl 12.5 mcg every 3 days.  reviewed. Discussed proper storage of narcotics.  Keep in a safe place away from children, other adults and pets.    > Follow-up in 3 months  > Rx for Fentanyl patch 12.5 mcg for 30 days  > SBRT per Dr. Carlie Valle  > No adjuvant chemotherapy  > Follow up with Dr. Alanna Field after radiation

## 2020-03-03 NOTE — ACP (ADVANCE CARE PLANNING)
dvanced Directive:  Patient offered information on development of Advanced Care Planning. *CM has sent to patient in the past.   \"I keep forgetting about it. I'll try to remember. \"

## 2020-03-10 DIAGNOSIS — G89.3 CANCER RELATED PAIN: ICD-10-CM

## 2020-03-10 DIAGNOSIS — C22.1 CHOLANGIOCARCINOMA OF BILIARY TRACT (HCC): Primary | ICD-10-CM

## 2020-03-10 RX ORDER — FENTANYL 25 UG/1
1 PATCH TRANSDERMAL
Qty: 10 PATCH | Refills: 0 | Status: SHIPPED | OUTPATIENT
Start: 2020-03-10 | End: 2020-04-09

## 2020-03-17 DIAGNOSIS — K85.90 RECURRENT PANCREATITIS: Primary | ICD-10-CM

## 2020-03-17 RX ORDER — TRAMADOL HYDROCHLORIDE 50 MG/1
50 TABLET ORAL
Qty: 60 TAB | Refills: 0 | Status: SHIPPED | OUTPATIENT
Start: 2020-03-17 | End: 2020-04-19

## 2020-03-17 NOTE — TELEPHONE ENCOUNTER
#[phone]  Pt is requesting refill on Tramadol that is not on the med list.    Please send to [facility] on file.

## 2020-03-19 ENCOUNTER — TELEPHONE (OUTPATIENT)
Dept: PALLATIVE CARE | Age: 65
End: 2020-03-19

## 2020-03-19 NOTE — TELEPHONE ENCOUNTER
Palliative Medicine  Nursing Note  [phone])  Fax [phone]      Telephone Call  Patient Name: Sushil Shaffer  YOB: 1955    3/19/2020    Received outpatient Palliative Medicine referral from Dr. Negrita Andre to see patient for symptom management and supportive care. Chart  reviewed. Irma Ivan is a  62 y.o. male with extrahepatic cholangiocarcinoma. Pt's most recent office visit with Dr. Negrita Andre was 2/28/2020. See Office Visit note for complete HPI, treatment history, and plan. ACP: Not on file                                                                                                                                 Nurse called patient/wife to introduced Palliative Medicine services.   Left voicemail for return call    Information shared with Dr. Edmundo Hermosillo, RN  Palliative Medicine

## 2020-03-20 ENCOUNTER — PATIENT OUTREACH (OUTPATIENT)
Dept: OTHER | Age: 65
End: 2020-03-20

## 2020-03-20 NOTE — PROGRESS NOTES
CM  follow up call. Patient with cholangiocarcinoma s/p pylorus-preserving Whipple 10/2017, HTN, DM, BPH and recurrent pancreatitis. * Currently with mervin aortic mass/ treatment - planned Stereotactic body radiation therapy (SBRT) with Dr. Christiano Villa followed by surgery. Chart review:  Pain management - referred to Palliative to manage pain. 10:30am    Attempted contact with patient for CM FU. Left discreet message on voicemail with this CM contact information. 2:00pm  Incoming call from patient's wife for CM follow up services. Verified  and address for HIPAA security. * Mrs. Trent Bundy is very worried. Mr. Shruthi Redmond pain with the SBRT has increased. Glad that is finished. - Source of the pain is thought to be dying tumor pressing on lumbar nerve.     - Mr. Cristina is resistant to Percocet/ Oxycodone due to bad dreams.     - He has been successfully treated with dilaudid in the past.    * Weight loss has stabilized with working with nutritionist, but he has not gained. - She was waiting for a call from Palliative care. - CM informs Mrs. Cristina that Palliative outreach was attempted yesterday. LVM- probably on Mr. Cristina's phone.     - Shared contact with Palliative Care-  [phone]- suggested she ask for Willow Morales RN who placed the call yesterday. She will call this afternoon. * Plan is to place stent / ERCP and possible revision of Whipple. * Informed Mrs. Cristina  that my department is being pulled into screening efforts for the COVID-19 outbreak. * She is doing well and practicing all the good hygiene measures that are being promoted by CDC and WHO. - Less frequent calls to patient during this time. - Instructed patient to call CM if any immediate needs arise. * COVID-19 precautions reviewed. - Mrs. Trent Bundy is a RN preparing for her PhD and is familiar with CDC guidelines. - Hoping that procedures planned will not be interupted by COVID pandemic.   CM shares that his procedures/ surgery is not elective and reassures Mrs. Cristina that hospital will be particianed to protect patients and prevent exposure. - CM encourages family to restrict contact with grandchildren for this time. * MED CHANGES: Pain management. 25 Fentanyl patch with Tramadol for breakthrough is not holding him. * MD HUTTON:   Attended SBRT/ 2 week 5 tx protocol.  /  April 1 plans for ERCP and stent  * Patient Concerns:  Pain level / weight loss/   * CM Concerns: At risk for infection/ COVID-19  * Education/ Resources. COVID resources  [email or web address]    Nurse Line resources:    1)  With our new insurance, If you do think that you need to be seen in the Emergency Room, a call to the Nurse Access Line will reduce your copayment. The number is in red on your insurance card and is [phone]-[phone] ([phone]). If the emergency does not allow you to call before the visit, call after you are discharged and explain the circumstances. 2) [address] hotline number is [phone]. 3) [address]: 901-USU-[id number]    Will follow for CM services. Next planned outreach:  2 weeks  4/3    Stent/ ECRP? pain controlled?

## 2020-03-27 ENCOUNTER — DOCUMENTATION ONLY (OUTPATIENT)
Dept: SURGERY | Age: 65
End: 2020-03-27

## 2020-03-29 RX ORDER — RAMIPRIL 10 MG/1
CAPSULE ORAL
Qty: 90 CAP | Refills: 3 | Status: SHIPPED | OUTPATIENT
Start: 2020-03-29 | End: 2020-10-15

## 2020-03-30 ENCOUNTER — TELEPHONE (OUTPATIENT)
Dept: PALLATIVE CARE | Age: 65
End: 2020-03-30

## 2020-03-30 NOTE — TELEPHONE ENCOUNTER
Palliative Medicine  Nursing Note  ([phone])  Fax [phone]        Telephone Call  Patient Name: Rasheed Cross  YOB: 1955     3/30/2020     Received outpatient Palliative Medicine referral from Dr. Alan Swanson see patient for symptom management and supportive care. Chart  reviewed.  Pallavi Saldaña is a  62 y.o. male with extrahepatic cholangiocarcinoma.  Pt's most recent office visit with Dr. Elvira Lloyd was 2/28/2020. Oxana Fowler for complete HPI, treatment history, and plan.     ACP: Not on file                                                                                                                                 Nurse called patient to introduced Palliative Medicine services.  Left voicemail for return call     Information shared with Dr. Rosa Rodriguez, RN  Palliative Medicine

## 2020-04-01 ENCOUNTER — APPOINTMENT (OUTPATIENT)
Dept: GENERAL RADIOLOGY | Age: 65
End: 2020-04-01
Attending: INTERNAL MEDICINE
Payer: COMMERCIAL

## 2020-04-01 ENCOUNTER — ANESTHESIA EVENT (OUTPATIENT)
Dept: ENDOSCOPY | Age: 65
End: 2020-04-01
Payer: COMMERCIAL

## 2020-04-01 ENCOUNTER — ANESTHESIA (OUTPATIENT)
Dept: ENDOSCOPY | Age: 65
End: 2020-04-01
Payer: COMMERCIAL

## 2020-04-01 ENCOUNTER — HOSPITAL ENCOUNTER (OUTPATIENT)
Age: 65
Setting detail: OUTPATIENT SURGERY
Discharge: HOME | End: 2020-04-01
Attending: INTERNAL MEDICINE | Admitting: INTERNAL MEDICINE
Payer: COMMERCIAL

## 2020-04-01 VITALS
DIASTOLIC BLOOD PRESSURE: 73 MMHG | SYSTOLIC BLOOD PRESSURE: 115 MMHG | BODY MASS INDEX: 23.11 KG/M2 | TEMPERATURE: 97.6 F | WEIGHT: 152 LBS | OXYGEN SATURATION: 100 % | RESPIRATION RATE: 17 BRPM | HEART RATE: 67 BPM

## 2020-04-01 PROCEDURE — 74011250636 HC RX REV CODE- 250/636: Performed by: NURSE ANESTHETIST, CERTIFIED REGISTERED

## 2020-04-01 PROCEDURE — 77030010803: Performed by: INTERNAL MEDICINE

## 2020-04-01 PROCEDURE — C1726 CATH, BAL DIL, NON-VASCULAR: HCPCS | Performed by: INTERNAL MEDICINE

## 2020-04-01 PROCEDURE — 76060000033 HC ANESTHESIA 1 TO 1.5 HR: Performed by: INTERNAL MEDICINE

## 2020-04-01 PROCEDURE — C2617 STENT, NON-COR, TEM W/O DEL: HCPCS | Performed by: INTERNAL MEDICINE

## 2020-04-01 PROCEDURE — 77030040361 HC SLV COMPR DVT MDII -B: Performed by: INTERNAL MEDICINE

## 2020-04-01 PROCEDURE — 76040000008: Performed by: INTERNAL MEDICINE

## 2020-04-01 PROCEDURE — 77030007288 HC DEV LOK BILI BSC -A: Performed by: INTERNAL MEDICINE

## 2020-04-01 PROCEDURE — 74011000250 HC RX REV CODE- 250: Performed by: NURSE ANESTHETIST, CERTIFIED REGISTERED

## 2020-04-01 PROCEDURE — 77030012596 HC SPHNTOM BILI BSC -E: Performed by: INTERNAL MEDICINE

## 2020-04-01 DEVICE — PANCREATIC STENT
Type: IMPLANTABLE DEVICE | Site: PANCREAS | Status: FUNCTIONAL
Brand: ADVANIX™ PANCREATIC STENT

## 2020-04-01 RX ORDER — EPINEPHRINE 0.1 MG/ML
1 INJECTION INTRACARDIAC; INTRAVENOUS
Status: DISCONTINUED | OUTPATIENT
Start: 2020-04-01 | End: 2020-04-01 | Stop reason: HOSPADM

## 2020-04-01 RX ORDER — ATROPINE SULFATE 0.1 MG/ML
0.5 INJECTION INTRAVENOUS
Status: DISCONTINUED | OUTPATIENT
Start: 2020-04-01 | End: 2020-04-01 | Stop reason: HOSPADM

## 2020-04-01 RX ORDER — LIDOCAINE HYDROCHLORIDE 20 MG/ML
INJECTION, SOLUTION EPIDURAL; INFILTRATION; INTRACAUDAL; PERINEURAL AS NEEDED
Status: DISCONTINUED | OUTPATIENT
Start: 2020-04-01 | End: 2020-04-01 | Stop reason: HOSPADM

## 2020-04-01 RX ORDER — NALOXONE HYDROCHLORIDE 0.4 MG/ML
0.4 INJECTION, SOLUTION INTRAMUSCULAR; INTRAVENOUS; SUBCUTANEOUS
Status: DISCONTINUED | OUTPATIENT
Start: 2020-04-01 | End: 2020-04-01 | Stop reason: HOSPADM

## 2020-04-01 RX ORDER — ROCURONIUM BROMIDE 10 MG/ML
INJECTION, SOLUTION INTRAVENOUS AS NEEDED
Status: DISCONTINUED | OUTPATIENT
Start: 2020-04-01 | End: 2020-04-01 | Stop reason: HOSPADM

## 2020-04-01 RX ORDER — SODIUM CHLORIDE 0.9 % (FLUSH) 0.9 %
5-40 SYRINGE (ML) INJECTION EVERY 8 HOURS
Status: DISCONTINUED | OUTPATIENT
Start: 2020-04-01 | End: 2020-04-01 | Stop reason: HOSPADM

## 2020-04-01 RX ORDER — PHENYLEPHRINE HCL IN 0.9% NACL 0.4MG/10ML
SYRINGE (ML) INTRAVENOUS AS NEEDED
Status: DISCONTINUED | OUTPATIENT
Start: 2020-04-01 | End: 2020-04-01 | Stop reason: HOSPADM

## 2020-04-01 RX ORDER — ONDANSETRON 2 MG/ML
INJECTION INTRAMUSCULAR; INTRAVENOUS AS NEEDED
Status: DISCONTINUED | OUTPATIENT
Start: 2020-04-01 | End: 2020-04-01 | Stop reason: HOSPADM

## 2020-04-01 RX ORDER — NEOSTIGMINE METHYLSULFATE 1 MG/ML
INJECTION, SOLUTION INTRAVENOUS AS NEEDED
Status: DISCONTINUED | OUTPATIENT
Start: 2020-04-01 | End: 2020-04-01 | Stop reason: HOSPADM

## 2020-04-01 RX ORDER — PROPOFOL 10 MG/ML
INJECTION, EMULSION INTRAVENOUS AS NEEDED
Status: DISCONTINUED | OUTPATIENT
Start: 2020-04-01 | End: 2020-04-01 | Stop reason: HOSPADM

## 2020-04-01 RX ORDER — SODIUM CHLORIDE 9 MG/ML
INJECTION, SOLUTION INTRAVENOUS
Status: DISCONTINUED | OUTPATIENT
Start: 2020-04-01 | End: 2020-04-01 | Stop reason: HOSPADM

## 2020-04-01 RX ORDER — DEXTROMETHORPHAN/PSEUDOEPHED 2.5-7.5/.8
1.2 DROPS ORAL
Status: DISCONTINUED | OUTPATIENT
Start: 2020-04-01 | End: 2020-04-01 | Stop reason: HOSPADM

## 2020-04-01 RX ORDER — FLUMAZENIL 0.1 MG/ML
0.2 INJECTION INTRAVENOUS
Status: DISCONTINUED | OUTPATIENT
Start: 2020-04-01 | End: 2020-04-01 | Stop reason: HOSPADM

## 2020-04-01 RX ORDER — GLYCOPYRROLATE 0.2 MG/ML
INJECTION INTRAMUSCULAR; INTRAVENOUS AS NEEDED
Status: DISCONTINUED | OUTPATIENT
Start: 2020-04-01 | End: 2020-04-01 | Stop reason: HOSPADM

## 2020-04-01 RX ORDER — SODIUM CHLORIDE 0.9 % (FLUSH) 0.9 %
5-40 SYRINGE (ML) INJECTION AS NEEDED
Status: DISCONTINUED | OUTPATIENT
Start: 2020-04-01 | End: 2020-04-01 | Stop reason: HOSPADM

## 2020-04-01 RX ORDER — SUCCINYLCHOLINE CHLORIDE 20 MG/ML
INJECTION INTRAMUSCULAR; INTRAVENOUS AS NEEDED
Status: DISCONTINUED | OUTPATIENT
Start: 2020-04-01 | End: 2020-04-01 | Stop reason: HOSPADM

## 2020-04-01 RX ORDER — DEXAMETHASONE SODIUM PHOSPHATE 4 MG/ML
INJECTION, SOLUTION INTRA-ARTICULAR; INTRALESIONAL; INTRAMUSCULAR; INTRAVENOUS; SOFT TISSUE AS NEEDED
Status: DISCONTINUED | OUTPATIENT
Start: 2020-04-01 | End: 2020-04-01 | Stop reason: HOSPADM

## 2020-04-01 RX ORDER — GABAPENTIN 100 MG/1
CAPSULE ORAL 2 TIMES DAILY
COMMUNITY
End: 2020-08-28 | Stop reason: SDUPTHER

## 2020-04-01 RX ADMIN — SUCCINYLCHOLINE CHLORIDE 160 MG: 20 INJECTION, SOLUTION INTRAMUSCULAR; INTRAVENOUS at 10:18

## 2020-04-01 RX ADMIN — PHENYLEPHRINE HYDROCHLORIDE 80 MCG: 10 INJECTION INTRAVENOUS at 11:00

## 2020-04-01 RX ADMIN — ROCURONIUM BROMIDE 10 MG: 10 SOLUTION INTRAVENOUS at 10:25

## 2020-04-01 RX ADMIN — ONDANSETRON HYDROCHLORIDE 4 MG: 2 INJECTION, SOLUTION INTRAMUSCULAR; INTRAVENOUS at 10:50

## 2020-04-01 RX ADMIN — ROCURONIUM BROMIDE 10 MG: 10 SOLUTION INTRAVENOUS at 10:41

## 2020-04-01 RX ADMIN — LIDOCAINE HYDROCHLORIDE 100 MG: 20 INJECTION, SOLUTION EPIDURAL; INFILTRATION; INTRACAUDAL; PERINEURAL at 10:18

## 2020-04-01 RX ADMIN — PHENYLEPHRINE HYDROCHLORIDE 80 MCG: 10 INJECTION INTRAVENOUS at 10:40

## 2020-04-01 RX ADMIN — ROCURONIUM BROMIDE 10 MG: 10 SOLUTION INTRAVENOUS at 10:18

## 2020-04-01 RX ADMIN — SODIUM CHLORIDE: 900 INJECTION, SOLUTION INTRAVENOUS at 10:12

## 2020-04-01 RX ADMIN — DEXAMETHASONE SODIUM PHOSPHATE 4 MG: 4 INJECTION, SOLUTION INTRAMUSCULAR; INTRAVENOUS at 10:25

## 2020-04-01 RX ADMIN — PHENYLEPHRINE HYDROCHLORIDE 120 MCG: 10 INJECTION INTRAVENOUS at 10:50

## 2020-04-01 RX ADMIN — PROPOFOL 200 MG: 10 INJECTION, EMULSION INTRAVENOUS at 10:18

## 2020-04-01 RX ADMIN — PHENYLEPHRINE HYDROCHLORIDE 80 MCG: 10 INJECTION INTRAVENOUS at 10:45

## 2020-04-01 RX ADMIN — ROCURONIUM BROMIDE 10 MG: 10 SOLUTION INTRAVENOUS at 10:40

## 2020-04-01 RX ADMIN — PHENYLEPHRINE HYDROCHLORIDE 80 MCG: 10 INJECTION INTRAVENOUS at 10:22

## 2020-04-01 RX ADMIN — GLUCAGON HYDROCHLORIDE 0.5 MG: KIT at 10:38

## 2020-04-01 RX ADMIN — Medication 3 MG: at 11:04

## 2020-04-01 RX ADMIN — GLYCOPYRROLATE 0.4 MG: 0.2 INJECTION, SOLUTION INTRAMUSCULAR; INTRAVENOUS at 11:04

## 2020-04-01 NOTE — DISCHARGE INSTRUCTIONS
DISCHARGE SUMMARY from Nurse    PATIENT INSTRUCTIONS:    After general anesthesia or intravenous sedation, for 24 hours or while taking prescription Narcotics:  · Limit your activities  · Do not drive and operate hazardous machinery  · Do not make important personal or business decisions  · Do  not drink alcoholic beverages  · If you have not urinated within 8 hours after discharge, please contact your surgeon on call. Report the following to your surgeon:  · Excessive pain, swelling, redness or odor of or around the surgical area  · Temperature over 100.5  · Nausea and vomiting lasting longer than 4 hours or if unable to take medications  · Any signs of decreased circulation or nerve impairment to extremity: change in color, persistent  numbness, tingling, coldness or increase pain  · Any questions    What to do at Home:      *  Please give a list of your current medications to your Primary Care Provider. *  Please update this list whenever your medications are discontinued, doses are      changed, or new medications (including over-the-counter products) are added. *  Please carry medication information at all times in case of emergency situations. These are general instructions for a healthy lifestyle:    No smoking/ No tobacco products/ Avoid exposure to second hand smoke  Surgeon General's Warning:  Quitting smoking now greatly reduces serious risk to your health. Obesity, smoking, and sedentary lifestyle greatly increases your risk for illness    A healthy diet, regular physical exercise & weight monitoring are important for maintaining a healthy lifestyle    You may be retaining fluid if you have a history of heart failure or if you experience any of the following symptoms:  Weight gain of 3 pounds or more overnight or 5 pounds in a week, increased swelling in our hands or feet or shortness of breath while lying flat in bed.   Please call your doctor as soon as you notice any of these symptoms; do not wait until your next office visit. The discharge information has been reviewed with the patient. The patient verbalized understanding. Discharge medications reviewed with the patient.covi. coPatient Education   Learning About Coronavirus [phone])  Coronavirus [phone]): Overview  What is coronavirus (COVID-19)? The coronavirus disease (COVID-19) is caused by a virus. It is an illness that was first found in [facility], in December 2019. It has since spread worldwide. The virus can cause fever, cough, and trouble breathing. In severe cases, it can cause pneumonia and make it hard to breathe without help. It can cause death. Coronaviruses are a large group of viruses. They cause the common cold. They also cause more serious illnesses like Middle East respiratory syndrome (MERS) and severe acute respiratory syndrome (SARS). COVID-19 is caused by a novel coronavirus. That means it's a new type that has not been seen in people before. This virus spreads person-to-person through droplets from coughing and sneezing. It can also spread when you are close to someone who is infected. And it can spread when you touch something that has the virus on it, such as a doorknob or a tabletop. What can you do to protect yourself from coronavirus (COVID-19)? The best way to protect yourself from getting sick is to:  · Avoid areas where there is an outbreak. · Avoid contact with people who may be infected. · Wash your hands often with soap or alcohol-based hand sanitizers. · Avoid crowds and try to stay at least 6 feet away from other people. · Wash your hands often, especially after you cough or sneeze. Use soap and water, and scrub for at least 20 seconds. If soap and water aren't available, use an alcohol-based hand . · Avoid touching your mouth, nose, and eyes with unwashed hands. What can you do to avoid spreading the virus to others?   To help avoid spreading the virus to others:  · Cover your mouth with a tissue when you cough or sneeze. Then throw the tissue in the trash. · Use a disinfectant to clean things that you touch often. · Stay home if you are sick or have been exposed to the virus. Don't go to school, work, or public areas. And don't use public transportation. · If you are sick:  ? Leave your home only if you need to get medical care. But call the doctor's office first so they know you're coming, and wear a face mask when you go. ? Wear a face mask around other people. It can help stop the spread of the virus when you cough or sneeze. ? Clean and disinfect your home every day. Use household  and disinfectant wipes or sprays. Take special care to clean things that you grab with your hands. These include doorknobs, remote controls, phones, and handles on your refrigerator and microwave. And don't forget countertops, tabletops, bathrooms, and computer keyboards. When to call for help  Call 911 anytime you think you may need emergency care. For example, call if:  · You have severe trouble breathing. · You have severe dehydration. Symptoms of dehydration include:  ? Dry eyes and a dry mouth. ? Passing only a little urine. ? Feeling thirstier than usual.  · You are extremely confused or not thinking clearly. · You pass out (lose consciousness). Call your doctor now if you develop symptoms such as:  · Shortness of breath. · Fever. · Cough. If you need to get care, call ahead to the doctor's office for instructions before you go. Make sure you wear a face mask when you go there to prevent exposing other people to the virus. Where can you get the latest information? The following health organizations are tracking and studying this virus. Their websites contain the most up-to-date information. [place] also learn what to do if you think you may have been exposed to the virus. · U.S. Centers for Disease Control and Prevention (CDC):  The CDC provides updated news about the disease and travel advice. The website also tells you how to prevent the spread of infection. www.cdc.gov  · World Health Organization [facility]): WHO offers information about the virus outbreaks. WHO also has travel advice. www.who.int  Current as of: March 20, 2020                Content Version: 12.4  © 2848-6485 Healthwise, Incorporated. Care instructions adapted under license by your healthcare professional. If you have questions about a medical condition or this instruction, always ask your healthcare professional. [address] any warranty or liability for your use of this information. and appropriate educational materials and side effects teaching were provided. ___________________________________________________________________________________________________________________________________BON Luigi Raman  [address] [facility] [address] [facility]  [id number]  1955    DISCOMFORT:  Sore throat- throat lozenges or warm salt water gargle  redness at IV site- apply warm compress to area; if redness or soreness persist- contact your physician  Gaseous discomfort- walking, belching will help relieve any discomfort    DIET  You may eat and drink after you leave. You may resume your regular diet - however -  remember your colon is empty and a heavy meal will produce gas. Avoid these foods:  vegetables, fried / greasy foods, carbonated drinks    ACTIVITY  You may resume your normal daily activities   Spend the remainder of the day resting -  avoid any strenuous activity. You may not operate a vehicle for 12 hours  You may not engage in an occupation involving machinery or appliances for rest of today  You may not drink alcoholic beverages for at least 12 hours  Avoid making any critical decisions for at least 24 hour    CALL M.D.   ANY SIGN OF   Increasing pain, nausea, vomiting  Abdominal distension (swelling)  New increased bleeding (oral or rectal)  Fever (chills)  Pain in chest area  Bloody discharge from nose or mouth  Shortness of breath    Follow-up Instructions:   Call Dr. Valerie Streeter for any questions or problems. If we took a biopsy please call the office within 2 weeks to discuss your pathology results.  Telephone # [phone]       ENDOSCOPY FINDINGS:  1. - Anastomotic Stenosis  2. - Pancreatic anastomotic Sticture

## 2020-04-01 NOTE — H&P
[address] [facility] [address]  [address] [facility] [address]  [place], [address]  [phone]                                History and Physical     NAME: Herbert Nettles   :  1955   MRN:  [id number]     HPI:  The patient was seen and examined. Past Surgical History:   Procedure Laterality Date    ABDOMEN SURGERY PROC UNLISTED  10/2017    Whipple     HX GI      COLONOSCOPY, POLYPS (BENIGN)    HX GI  10/06/2017    Whipple Dr. Neysa Meckel [facility]     [address]      Ablation     HX ORTHOPAEDIC      Spinal fusion W/ HARDWARE    HX OTHER SURGICAL  2017    [place], Dr. Javad Morales      PORTACATH - then removed    NEUROLOGICAL PROCEDURE UNLISTED      Ting hole washout from cerebral hemorrhage     Past Medical History:   Diagnosis Date    Arrhythmia     Previous a.fib, ablation, NSR now; NO LONGER FOLLOWED BY CARDIOLOGIST.     Autoimmune disease ([address]     SJOGREN'S    Cancer ([address]     COMMON BILE DUCT, ADENOCARCINOMA    Diabetes ([address]     Family history of skin cancer     Hypertension     Sepsis ([facility] 2017    STENTING TO BILE DUCT    Status post chemotherapy     Stroke [facility])     brain aneurysm - no deficits    Sun-damaged skin     Sunburn, blistering      Social History     Tobacco Use    Smoking status: Never Smoker    Smokeless tobacco: Never Used   Substance Use Topics    Alcohol use: Never     Frequency: Never     Comment: very rare    Drug use: No     Allergies   Allergen Reactions    Shellfish Derived Anaphylaxis     Soft shell crabs    Morphine Nausea and Vomiting    Pcn [Penicillins] Other (comments)     Pt stated \"always been told PCN\"  Pt tolerated other cephalosporins in the past     Family History   Problem Relation Age of Onset    Cancer Father         Breast and Colon    Cancer Mother         LEUKEMIA    No Known Problems Sister     No Known Problems Brother     No Known Problems Sister     Anesth Problems Neg Hx Current Facility-Administered Medications   Medication Dose Route Frequency    naloxone (NARCAN) injection 0.4 mg  0.4 mg IntraVENous Multiple    flumazeniL (ROMAZICON) 0.1 mg/mL injection 0.2 mg  0.2 mg IntraVENous Multiple    simethicone (MYLICON) 51EC/0.3GP oral drops 80 mg  1.2 mL Oral Multiple    atropine injection 0.5 mg  0.5 mg IntraVENous ONCE PRN    EPINEPHrine (ADRENALIN) 0.1 mg/mL syringe 1 mg  1 mg Endoscopically ONCE PRN    glucagon (GLUCAGEN) injection 1 mg  1 mg IntraVENous ONCE PRN    iopamidoL (ISOVUE 300) 61 % contrast injection 50 mL  50 mL Other RAD ONCE    iopamidoL (ISOVUE 300) 61 % contrast injection             PHYSICAL EXAM:  General: WD, WN. Alert, cooperative, no acute distress    HEENT: NC, Atraumatic. PERRLA, EOMI. Anicteric sclerae. Lungs:  CTA Bilaterally. No Wheezing/Rhonchi/Rales. Heart:  Regular  rhythm,  No murmur, No Rubs, No Gallops  Abdomen: Soft, Non distended, Non tender.  +Bowel sounds, no HSM  Extremities: No c/c/e  Neurologic:  CN 2-12 gi, Alert and oriented X 3. No acute neurological distress   Psych:   Good insight. Not anxious nor agitated. The heart, lungs and mental status were satisfactory for the administration of GA and for the procedure.       Mallampati score: 2       Assessment:   · CholangioCa s/p Whipple with recurrence  · Pancreatic anastomotic stricture    Plan:   · Endoscopic procedure  · GA

## 2020-04-01 NOTE — PROGRESS NOTES

## 2020-04-01 NOTE — ANESTHESIA POSTPROCEDURE EVALUATION
Post-Anesthesia Evaluation and Assessment    Patient: Herbert Nettles MRN: [id number]  SSN: [id number]    YOB: 1955  Age: 62 y.o. Sex: male      I have evaluated the patient and they are stable and ready for discharge from the PACU. Cardiovascular Function/Vital Signs  Visit Vitals  /83   Pulse 87   Temp 36.8 °C (98.3 °F)   Resp 16   Wt 68.9 kg (152 lb)   SpO2 99%   BMI 23.11 kg/m²       Patient is status post General anesthesia for Procedure(s):  ENDOSCOPIC RETROGRADE CHOLANGIOPANCREATOGRAPHY (ERCP)  BILIARY STENT PLACEMENT  ANASTAMOSIS DILATATION. Nausea/Vomiting: None    Postoperative hydration reviewed and adequate. Pain:  Pain Scale 1: Numeric (0 - 10) (04/01/20 1002)  Pain Intensity 1: 0 (04/01/20 1002)   Managed    Neurological Status: At baseline    Mental Status, Level of Consciousness: Alert and  oriented to person, place, and time    Pulmonary Status:   O2 Device: Blow by oxygen (04/01/20 1114)   Adequate oxygenation and airway patent    Complications related to anesthesia: None    Post-anesthesia assessment completed. No concerns    Signed By: Velvet Street MD     April 1, 2020              Procedure(s):  ENDOSCOPIC RETROGRADE CHOLANGIOPANCREATOGRAPHY (ERCP)  BILIARY STENT PLACEMENT  ANASTAMOSIS DILATATION.     general    <BSHSIANPOST>    Vitals Value Taken Time   /83 4/1/2020 11:14 AM   Temp     Pulse 87 4/1/2020 11:14 AM   Resp 16 4/1/2020 11:14 AM   SpO2 99 % 4/1/2020 11:14 AM

## 2020-04-01 NOTE — ANESTHESIA PREPROCEDURE EVALUATION
Relevant Problems   No relevant active problems       Anesthetic History     PONV          Review of Systems / Medical History  Patient summary reviewed, nursing notes reviewed and pertinent labs reviewed    Pulmonary                   Neuro/Psych       CVA  TIA     Cardiovascular    Hypertension        Dysrhythmias       Exercise tolerance: >4 METS     GI/Hepatic/Renal               Comments: H/o whipple Endo/Other    Diabetes         Other Findings              Physical Exam    Airway  Mallampati: I  TM Distance: > 6 cm  Neck ROM: normal range of motion   Mouth opening: Normal     Cardiovascular    Rhythm: regular  Rate: normal         Dental  No notable dental hx       Pulmonary  Breath sounds clear to auscultation               Abdominal         Other Findings            Anesthetic Plan    ASA: 3  Anesthesia type: general          Induction: Intravenous  Anesthetic plan and risks discussed with: Patient

## 2020-04-01 NOTE — PROCEDURES
[address] [facility] [address]  [address] [facility] [address], [address]  [phone]                   ERCP NOTE    NAME:  Swati Klein   :   1955   MRN:   [id number]     Date/Time:  2020 11:11 AM    Procedure Type:   ERCPwith pancreatic stent placement, anastomotic dilation     Indications: Pancreatic anastomotic stricture  Pre-operative Diagnosis: see indication above  Post-operative Diagnosis:  See findings below  : Rickie Yung MD    Staff: Endoscopy Technician-1: Fernando Mckeon  Endoscopy RN-1: Candy Kowalski RN     Implants: One Advanix pancreatic stent 5 Fr X 5 cm with single extrnal pigtail was placed into pancreatic duct besides existing pancreatic stent (7 Fr)    Referring Provider:    Sangeeta Mckinley MD, Laura Martinez DO    Sedation:  General anesthesia    Procedure Details:  After informed consent was obtained with all risks and benefits of procedure explained, the patient was taken to the fluoroscopy suite and placed in the prone position. Upon sequential sedation as per above, the [address]  was inserted via the mouthpeice and carefully advanced to the second portion of the duodenum. The quality of visualization was good. The duodenoscope was withdrawn into a short position. Findings:   Esophagus:normal  Stomach: normal mucosa. Lot of bile was seen in the stomach  Duodenum/jejunum: Pylorus preserving Whipple procedure anatomy was noted. Pancreatico-biliary limb was entered till the blind end and was normal. Stenosis was noted at the anastomosis of the pancreaticio-biliary limb. This was traversed  Ampulla:surgically absent  Cholangiogram: -not performed. Pancreatogram:-Pancreatico-jejunal anastomosis was pin hole and severely stenotic. Stent was noted and was patent. Cannulation was performed using a Dreamwire besides the stent. Contrast was not injected gently.  One 5 Fr X 5 cm external pigtail and single internal flap plastic stent was placed in the pancreatic duct besides the existing stent. Stents were in good position and clear fluid was draining promptly. Specimen Removed: * No specimens in log *    Complications: None. EBL:  None. Interventions:    Pancreatic: Pancreatico-jejunal anastomosis was pin hole and severely stenotic. Stent was noted and was patent. Cannulation was performed using a Dreamwire besides the stent. Contrast was not injected gently. One 5 Fr X 5 cm external pigtail and single internal flap plastic stent was placed in the pancreatic duct besides the existing stent. Stents were in good position and clear fluid was draining promptly. Biliary: none   Dilation was performed at the origin of the pancreatico-biliary imb using 12-15 mm CRE balloon    Impression:  -Pancreatic anastomotic stricture-additional stent placed  Anastomotic stenosis pancreatico-biliary limb-dilated    Recommendations:    Clear liquid diet and advance as tolerated. Resume normal medication(s).     ERCP with stent change/removal in 3 months  Watch for complications      Discharge Disposition:  Home following recovery in Endoscopy    Katerina Boothe MD  4/1/2020  11:11 AM

## 2020-04-03 ENCOUNTER — TELEPHONE (OUTPATIENT)
Dept: PALLATIVE CARE | Age: 65
End: 2020-04-03

## 2020-04-03 ENCOUNTER — PATIENT OUTREACH (OUTPATIENT)
Dept: OTHER | Age: 65
End: 2020-04-03

## 2020-04-03 NOTE — TELEPHONE ENCOUNTER
Calling patient back to try to confirm appt for 4/6/20 at 10:30am.  Pt confirmed appt date/time and address of office. Advised to arrive by 10:10 to complete paperwork and vitals.   Pt confirmed

## 2020-04-03 NOTE — TELEPHONE ENCOUNTER
Called patient to advise/confirm upcoming appt with Dr. Jackson Arechiga on 4/6/20     at 10:30am with arrival time 10:00am.  No answer at any number so left voicemail at all numbers with appt date/time and location. Also advised to please bring in your Drivers License and Insurance Card with you to appointment as well as any medication in the original container with you to appt.

## 2020-04-03 NOTE — PROGRESS NOTES
Resolving current episode of case management. Patient has met patient stated and/or medical goals. Patient consistenly demonstrates understanding of the medical action plan through execution of plan. Appointments with key providers are scheduled and attended. Plan of care is modified and updated to address new challenges and barriers with minimal support from the CM team(proactively uses physicians and community resources) The support system remains current and has been modified as needed. Patient continues to acquire needed resources from the current support system established. Final call to Mr. Cristina for CM services. Verified  and address for HIPAA security. * Stent placement was rough and he is still feeling pain from procedure. - Tolerating liquid diet. Lingering nausea, but hopes for improvement.     - Hopes to avoid a Whipple revision. * Wife is home with him.  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 * Informed patient that my department is being pulled into screening efforts for the COVID-19 outbreak. - CM is changing roles to address patient population needs. * Family is practicing all the good hygiene and social distancing measures that are being promoted by CDC and WHO. * Encouraged patient to contact CM if any needs arise. Chart Review:      Date/Time:      2020 11:11 AM     Procedure Type:   ERCPwith pancreatic stent placement, anastomotic dilation      Indications: Pancreatic anastomotic stricture  Pre-operative Diagnosis: see indication above  Post-operative Diagnosis:  See findings below  : Tawana Reid MD     Implants: One Advanix pancreatic stent 5 Fr X 5 cm with single extrnal pigtail was placed into pancreatic duct besides existing pancreatic stent (7 Fr)     Referring Provider:    Bairon Santiago MD, Shannon Beyer DO    Findings:   Esophagus:normal  Stomach: normal mucosa.  Lot of bile was seen in the stomach  Duodenum/jejunum: Pylorus preserving Whipple procedure anatomy was noted. Pancreatico-biliary limb was entered till the blind end and was normal. Stenosis was noted at the anastomosis of the pancreaticio-biliary limb. This was traversed  Ampulla:surgically absent  Cholangiogram: -not performed.   Pancreatogram:-Pancreatico-jejunal anastomosis was pin hole and severely stenotic. Stent was noted and was patent. Cannulation was performed using a Dreamwire besides the stent. Contrast was not injected gently. One 5 Fr X 5 cm external pigtail and single internal flap plastic stent was placed in the pancreatic duct besides the existing stent. Stents were in good position and clear fluid was draining promptly.     Pancreatic: Pancreatico-jejunal anastomosis was pin hole and severely stenotic. Stent was noted and was patent. Cannulation was performed using a Dreamwire besides the stent. Contrast was not injected gently. One 5 Fr X 5 cm external pigtail and single internal flap plastic stent was placed in the pancreatic duct besides the existing stent. Stents were in good position and clear fluid was draining promptly. Biliary: none   Dilation was performed at the origin of the pancreatico-biliary imb using 12-15 mm CRE balloon     Impression:  -Pancreatic anastomotic stricture-additional stent placed  Anastomotic stenosis pancreatico-biliary limb-dilated     Recommendations:    Clear liquid diet and advance as tolerated. Resume normal medication(s).     ERCP with stent change/removal in 3 months  Watch for complications      Discharge Disposition:  Home following recovery in Endoscopy     Tawana Reid MD

## 2020-04-06 ENCOUNTER — VIRTUAL VISIT (OUTPATIENT)
Dept: INTERNAL MEDICINE CLINIC | Age: 65
End: 2020-04-06

## 2020-04-06 ENCOUNTER — TELEPHONE (OUTPATIENT)
Dept: SURGERY | Age: 65
End: 2020-04-06

## 2020-04-06 ENCOUNTER — OFFICE VISIT (OUTPATIENT)
Dept: PALLATIVE CARE | Age: 65
End: 2020-04-06

## 2020-04-06 VITALS
TEMPERATURE: 97.8 F | BODY MASS INDEX: 22.88 KG/M2 | HEART RATE: 78 BPM | DIASTOLIC BLOOD PRESSURE: 65 MMHG | RESPIRATION RATE: 18 BRPM | SYSTOLIC BLOOD PRESSURE: 102 MMHG | OXYGEN SATURATION: 96 % | WEIGHT: 151 LBS | HEIGHT: 68 IN

## 2020-04-06 DIAGNOSIS — I48.0 PAROXYSMAL ATRIAL FIBRILLATION (HCC): ICD-10-CM

## 2020-04-06 DIAGNOSIS — I10 ESSENTIAL HYPERTENSION: ICD-10-CM

## 2020-04-06 DIAGNOSIS — R10.10 PAIN OF UPPER ABDOMEN: ICD-10-CM

## 2020-04-06 DIAGNOSIS — C24.9 CHOLANGIOCARCINOMA DETERMINED BY BIOPSY OF BILIARY TRACT (HCC): Primary | ICD-10-CM

## 2020-04-06 DIAGNOSIS — C22.1 CHOLANGIOCARCINOMA OF BILIARY TRACT (HCC): ICD-10-CM

## 2020-04-06 DIAGNOSIS — R35.1 BENIGN PROSTATIC HYPERPLASIA WITH NOCTURIA: ICD-10-CM

## 2020-04-06 DIAGNOSIS — E11.9 CONTROLLED TYPE 2 DIABETES MELLITUS WITHOUT COMPLICATION, WITHOUT LONG-TERM CURRENT USE OF INSULIN (HCC): Primary | ICD-10-CM

## 2020-04-06 DIAGNOSIS — R11.11 INTRACTABLE VOMITING WITHOUT NAUSEA, UNSPECIFIED VOMITING TYPE: ICD-10-CM

## 2020-04-06 DIAGNOSIS — Z98.890 S/P ABLATION OF ATRIAL FIBRILLATION: ICD-10-CM

## 2020-04-06 DIAGNOSIS — M54.9 MID BACK PAIN: ICD-10-CM

## 2020-04-06 DIAGNOSIS — N40.1 BENIGN PROSTATIC HYPERPLASIA WITH NOCTURIA: ICD-10-CM

## 2020-04-06 DIAGNOSIS — Z86.79 S/P ABLATION OF ATRIAL FIBRILLATION: ICD-10-CM

## 2020-04-06 RX ORDER — FENTANYL 12.5 UG/1
1 PATCH TRANSDERMAL
Qty: 5 PATCH | Refills: 0 | Status: SHIPPED | OUTPATIENT
Start: 2020-04-06 | End: 2020-04-13 | Stop reason: DRUGHIGH

## 2020-04-06 NOTE — ACP (ADVANCE CARE PLANNING)
Advance Care Planning (ACP) Provider Conversation Snapshot    Date of ACP Conversation: 04/06/20  Persons included in Conversation:  patient and POA  Length of ACP Conversation in minutes:  20 minutes    Authorized Decision Maker (if patient is incapable of making informed decisions):    This person is:   Healthcare Agent/Medical Power of  under Advance Directive      Primary Decision Maker: Irma Brunner - [phone]    Secondary Decision Maker: Ridge Cristina III - [phone]    Supplemental (Other) Decision Maker: Roberto Cristina - [phone]        For Patients with Decision Making Capacity:   Values/Goals: Exploration of values, goals, and preferences if recovery is not expected, even with continued medical treatment in the event of:  Imminent death    Conversation Outcomes / Follow-Up Plan:   Completed new Advance Directive

## 2020-04-06 NOTE — PROGRESS NOTES
Adrianna Mata is a 62 y.o. male who presents for evaluation of routine follow up. Last seen by me jan 17, 2020. This is a virtual visit due to coronavirus. Since my last visit with him, he had a CT guided bx done feb 3 that showed recurrence of his adenocarcinoma of pancreas. He has since finished radiation treatment, and will have repeat PET scans done in a few months. He also had a 2nd ercp done on April 1, and had a 2nd pancreatic duct stent placed. He continues to struggle with n/v, as well as loose stools. He has an appt later this week with pain management, as he is currently on fentanyl patch and prn ultram for his pancreas pain. He hopes to stop the patch soon. Weight down another 10 lbs or soon. ROS:  Constitutional: negative for fevers, chills, anorexia and weight loss  Eyes:   negative for visual disturbance and irritation  ENT:   negative for tinnitus,sore throat,nasal congestion,ear pain,hoarseness  Respiratory:  negative for cough, hemoptysis, dyspnea,wheezing  CV:   negative for chest pain, palpitations, lower extremity edema  GI:   ++ for nausea, vomiting, diarrhea, abdominal pain,melena  Genitourinary: negative for frequency, dysuria and hematuria  Musculoskel: negative for myalgias, arthralgias, back pain, muscle weakness, joint pain  Neurological:  negative for headaches, dizziness, focal weakness, numbness  Psychiatric:     Negative for depression or anxiety      Past Medical History:   Diagnosis Date    Arrhythmia     Previous a.fib, ablation, NSR now; NO LONGER FOLLOWED BY CARDIOLOGIST.     Autoimmune disease ([address]     SJOGREN'S    Cancer ([address]     COMMON BILE DUCT, ADENOCARCINOMA    Diabetes ([address]     Family history of skin cancer     Hypertension     Sepsis ([address] 2017    STENTING TO BILE DUCT    Status post chemotherapy     Stroke [facility]) 2008    brain aneurysm - no deficits    Sun-damaged skin     Sunburn, blistering        Past Surgical History:   Procedure Laterality Date    ABDOMEN SURGERY PROC UNLISTED  10/2017    [place]     HX GI      COLONOSCOPY, POLYPS (BENIGN)    [facility]X [facility]  [facility]/06/2017    [facility]     [address]  2005    Ablation     HX ORTHOPAEDIC  2000    Spinal fusion W/ HARDWARE    HX OTHER SURGICAL  11/07/2017    [place] [facility], Dr. Nidia Humphries  2017    PORTACATH - then removed    NEUROLOGICAL PROCEDURE UNLISTED  2005    Coram hole washout from cerebral hemorrhage       Family History   Problem Relation Age of Onset    Cancer Father         Breast and Colon    Cancer Mother         LEUKEMIA    No Known Problems Sister     No Known Problems Brother     No Known Problems Sister     Anesth Problems Neg Hx        Social History     Socioeconomic History    Marital status:      Spouse name: Not on file    Number of children: Not on file    Years of education: Not on file    Highest education level: Not on file   Occupational History    Not on file   Social Needs    Financial resource strain: Not on file    Food insecurity     Worry: Not on file     Inability: Not on file    Transportation needs     Medical: Not on file     Non-medical: Not on file   Tobacco Use    Smoking status: Never Smoker    Smokeless tobacco: Never Used   Substance and Sexual Activity    Alcohol use: Never     Frequency: Never     Comment: very rare    Drug use: No    Sexual activity: Yes     Partners: Female   Lifestyle    Physical activity     Days per week: Not on file     Minutes per session: Not on file    Stress: Not on file   Relationships    Social connections     Talks on phone: Not on file     Gets together: Not on file     Attends [employer] service: Not on file     Active member of club or organization: Not on file     Attends meetings of clubs or organizations: Not on file     Relationship status: Not on file    Intimate partner violence     Fear of current or ex partner: Not on file     Emotionally abused: Not on file     Physically abused: Not on file     Forced sexual activity: Not on file   Other Topics Concern    Not on file   Social History Narrative    Not on file          There were no vitals taken for this visit. Physical Examination:   General - Well appearing male. Appears to have lost more weight. Nontoxic, nad. In good spirits. Assessment/Plan:    1.  Dm, type 2--only on jardiance now. Sugars 100-130. Stopped glucophage to see if that would help with his GI complaints. Unfortunately, it did not improve things. 2. bph--continue flomax. He hopes to see urology soon as well  3.  htn--well controlled with altace  4. Recurrent cholangiocarcinoma--initially had whipple procedure, then feb 2020 had radiation to area for recurrence  5. Recurrent pancreatitis--had 2nd pancreatic stent placed by dr Freddie Rizo on April 1, and has plans to have 3rd placed in aug.    6.  Recurrent n/v/d--thus far, has not had any improvement. Being evaluated now for pancreatic insufficiency. 7.  pafib--sp ablation    rtc 3 months        Cherrie Valencia III, DO          Consent: Carolyn Blakely, who was seen by synchronous (real-time) audio-video technology, and/or his healthcare decision maker, is aware that this patient-initiated, Telehealth encounter on 4/6/2020 is a billable service, with coverage as determined by his insurance carrier. He is aware that he may receive a bill and has provided verbal consent to proceed: Yes. Assessment & Plan:             I spent at least 15 minutes with this established patient, and >50% of the time was spent counseling and/or coordinating care regarding dm, htn, recurrent pancreatitis, cholangiocarcinoma, bph  [phone]  Subjective:   Carolyn Blakely is a 62 y.o. male who was seen for Follow-up      Prior to Admission medications    Medication Sig Start Date End Date Taking?  Authorizing Provider   gabapentin (NEURONTIN) 100 mg capsule Take  by mouth two (2) times a day. Not sure of dose but takes 3 pills 2 times a day   Yes Provider, Historical   ramipriL (ALTACE) 10 mg capsule TAKE 1 CAPSULE BY MOUTH EVERY DAY AT NIGHT 3/29/20  Yes Silas Raya III, DO   traMADoL (ULTRAM) 50 mg tablet Take 1 Tab by mouth every six (6) hours as needed for Pain for up to 30 days. Max Daily Amount: 200 mg. 3/17/20 4/16/20 Yes Silas Raya III,    fentaNYL (DURAGESIC) 25 mcg/hr PATCH 1 Patch by TransDERmal route every seventy-two (72) hours for 30 days. Max Daily Amount: 1 Patch. 3/10/20 4/9/20 Yes Kayce Camacho NP   ondansetron (ZOFRAN ODT) 4 mg disintegrating tablet Take 1 Tab by mouth every eight (8) hours as needed for Nausea. 2/24/20  Yes Silas Raya III,    acetaminophen (TYLENOL) 325 mg tablet Take 2 Tabs by mouth every four (4) hours as needed for Pain or Fever (Over the counter medication). 1/2/20  Yes Nicky Tejeda NP   sucralfate (CARAFATE) 1 gram tablet Take 1 g by mouth four (4) times daily. Yes Provider, Historical   omeprazole (PRILOSEC) 40 mg capsule Take 1 Cap by mouth daily. 11/5/19  Yes Jennifer Llanes MD   empagliflozin (JARDIANCE) 25 mg tablet Take 1 Tab by mouth daily. 5/30/19  Yes Winter Gaona MD   sildenafil citrate (VIAGRA) 50 mg tablet Take 1 Tab by mouth as needed (ED). 5/6/19  Yes Silas Raya III, DO   tamsulosin (FLOMAX) 0.4 mg capsule TAKE ONE CAPSULE BY MOUTH EVERY EVENING 2/28/19  Yes Provider, Historical   cyanocobalamin (VITAMIN B12) 1,000 mcg/mL injection INJECT CONTENTS OF ONE VIAL INTRAMUSCULARLY EVERY OTHER WEEK 9/20/18  Yes Silas Raya III, DO   alpha-D-galactosidase (BEANO PO) Take 1 Tab by mouth two (2) times a day. Yes Other, MD Flynn   cetirizine (ZYRTEC) 10 mg tablet Take 10 mg by mouth nightly.    Yes Provider, Historical     Allergies   Allergen Reactions    Shellfish Derived Anaphylaxis     Soft shell crabs    Morphine Nausea and Vomiting    Pcn [Penicillins] Other (comments) Pt stated \"always been told PCN\"  Pt tolerated other cephalosporins in the past           ROS        Objective:   Vital Signs: (As obtained by patient/caregiver at home)  There were no vitals taken for this visit. [INSTRUCTIONS:  \"[x]\" Indicates a positive item  \"[]\" Indicates a negative item  -- DELETE ALL ITEMS NOT EXAMINED]    Constitutional: [x] Appears well-developed and well-nourished [x] No apparent distress      [] Abnormal -     Mental status: [x] Alert and awake  [x] Oriented to person/place/time [x] Able to follow commands    [] Abnormal -     Eyes:   EOM    [x]  Normal    [] Abnormal -   Sclera  [x]  Normal    [] Abnormal -          Discharge [x]  None visible   [] Abnormal -     HENT: [x] Normocephalic, atraumatic  [] Abnormal -   [x] Mouth/Throat: Mucous membranes are moist    External Ears [x] Normal  [] Abnormal -    Neck: [x] No visualized mass [] Abnormal -     Pulmonary/Chest: [x] Respiratory effort normal   [x] No visualized signs of difficulty breathing or respiratory distress        [] Abnormal -      Musculoskeletal:   [x] Normal gait with no signs of ataxia         [x] Normal range of motion of neck        [] Abnormal -     Neurological:        [x] No Facial Asymmetry (Cranial nerve 7 motor function) (limited exam due to video visit)          [x] No gaze palsy        [] Abnormal -          Skin:        [x] No significant exanthematous lesions or discoloration noted on facial skin         [] Abnormal -            Psychiatric:       [x] Normal Affect [] Abnormal -        [x] No Hallucinations    Other pertinent observable physical exam findings:-        We discussed the expected course, resolution and complications of the diagnosis(es) in detail. Medication risks, benefits, costs, interactions, and alternatives were discussed as indicated. I advised him to contact the office if his condition worsens, changes or fails to improve as anticipated.  He expressed understanding with the diagnosis(es) and plan. Carolyn Blakely is a 62 y.o. male being evaluated by a video visit encounter for concerns as above. A caregiver was present when appropriate. Due to this being a TeleHealth encounter (During ISQWU-46 public health emergency), evaluation of the following organ systems was limited: Vitals/Constitutional/EENT/Resp/CV/GI//MS/Neuro/Skin/Heme-Lymph-Imm. Pursuant to the emergency declaration under the [address] [place] [address], [facility] waiver authority and the [employer] Act, this Virtual  Visit was conducted, with patient's (and/or legal guardian's) consent, to reduce the patient's risk of exposure to COVID-19 and provide necessary medical care. Services were provided through a video synchronous discussion virtually to substitute for in-person clinic visit. Patient and provider were located at their individual homes.         Cherrie Valencia III, DO

## 2020-04-06 NOTE — PROGRESS NOTES
Palliative Medicine Office Visit  Palliative Medicine Nurse Check In  [phone]    Patient Name: Xavi So  YOB: 1955      Date of Office Visit: 4/6/2020  Patient states: \"  \"    From Check In Sheet (scanned in Media):  Has a medical provider talked with you about cardiopulmonary resuscitation (CPR)? [x] Yes   [] No   [] Unable to obtain    Nurse reminder to complete or update ACP FlowSheet:    Is ACP on the Problem List?    [] Yes    [x] No  IF ACP Document is ON FILE; Nurse to place ACP on Problem List     Is there an ACP Note in Chart Review/Note? [] Yes    [x] No   If NO: ALERT PROVIDER       Primary Decision MakerBting Lingok - [phone]    Secondary Decision Maker: Ridge Cristina III - Child - [phone]    Supplemental (Other) Decision Maker: Jenn Krishna Child - [phone]  Advance Care Planning 4/6/2020   Patient's [place] is: Verbal statement (Legal Next of Kin remains as decision maker)   Primary Decision Maker Name -   Primary Decision Maker Phone Number -   Primary Decision Maker Relationship to Patient -   Confirm Advance Directive None   Patient Would Like to Complete Advance Directive Yes   Does the patient have other document types -         Is there anything that we should know about you as a person in order to provide you the best care possible? Have you been to the ER, urgent care clinic since your last visit? [x] Yes   [] No   [] Unable to obtain    Have you been hospitalized since your last visit? [x] Yes   [] No   [] Unable to obtain    Have you seen or consulted any other health care providers outside of the [address] [place] [address] since your last visit?    [] Yes   [x] No   [] Unable to obtain    Functional status (describe):      Last BM: 4/5/2020     accessed (date): 4/6/2020    Bottle review (for opioid pain medication):  Medication 1:   Date filled:   Directions:   # filled:   # left:   # pills taking per day:  Last dose taken:    Medication 2:   Date filled:   Directions:   # filled:   # left:   # pills taking per day:  Last dose taken:    Medication 3:   Date filled:   Directions:   # filled:   # left:   # pills taking per day:  Last dose taken:    Medication 4:   Date filled:   Directions:   # filled:   # left:   # pills taking per day:  Last dose taken:

## 2020-04-06 NOTE — PATIENT INSTRUCTIONS
Office Policies Phone calls/patient messages: Please allow up to 24 hours for someone in the office to contact you about your call or message. Be mindful your provider may be out of the office or your message may require further review. We encourage you to use [employer] for your messages as this is a faster, more efficient way to communicate with our office Medication Refills: 
         
Prescription medications require 48-72 business hours to process. We encourage you to use [employer] for your refills. For controlled medications: Please allow 72 business hours to process. Certain medications may require you to  a written prescription at our office. NO narcotic/controlled medications will be prescribed after 4pm Monday through Friday or on weekends Form/Paperwork Completion: 
         
Please note a $25 fee may incur for all paperwork for completed by our providers. We ask that you allow 7-10 business days. Pre-payment is due prior to picking up/faxing the completed form. You may also download your forms to [employer] to have your doctor print off.

## 2020-04-06 NOTE — TELEPHONE ENCOUNTER
Patient called stating he would like to know if he needs to come in tomorrow for appointment. Please advise.

## 2020-04-06 NOTE — PROGRESS NOTES
Palliative Medicine Outpatient Services  [place]: [phone] ([phone])    Patient Name: Carolyn Blakely  YOB: 1955    Date of Current Visit: 04/06/20  Location of Current Visit:    [] [facility] Office  [] [facility] Office  [x] [facility] Office  [] Home  [] Other:      Date of Initial Visit: 4/6/2020  Referral from: Dr. Ranjeet Soliz  Primary Care Physician: Steffany Gomes DO      SUMMARY:   Carolyn Blakely is a 62y.o. 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 Ranjeet Soliz for management of symptoms and psychosocial support. 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 This is second marriage for both of them. Current treatment-completed RT to the para-aortic mass, pursuing diagnostics with GI physician Dr. Javed Walsh for gastroparesis and pancreatic enzymes, has had biliary stents replaced twice, third 1 scheduled for August 2020. Patient wants reversal of his Whipple after that. He follows closely with Dr. Sam Montalvo with general surgery. Initial Referral Intake note reviewed   PALLIATIVE DIAGNOSES:       ICD-10-CM ICD-9-CM    1. Cholangiocarcinoma determined by biopsy of biliary tract (HCC) C24.9 156.9 fentaNYL (Duragesic) 12 mcg/hr patch   2. Pain of upper abdomen R10.10 789.09    3. Mid back pain M54.9 724.5    4.  Intractable vomiting without nausea, unspecified vomiting type R11.11 787.03           PLAN:   Patient Instructions     Dear Carolyn Blakely ,    It was a pleasure seeing you today at [facility] office    We will see you again in 4 weeks    If labs or imaging tests have been ordered for you today, please call the office  at [phone] 48 hours after completion to obtain the results. Your stated goal:   - better pain management    Your described symptoms were: Fatigue: 5 Drowsiness: 4   Depression: 0 Pain: 2   Anxiety: 1 Nausea: 10   Anorexia: 3 Dyspnea: 0   Best Well-Bein Constipation: No   Other Problem (Comment): 0       This is the plan we talked about:    1. Mid back pain  - This pain is much better with SBRT to the back which you completed 2 weeks ago. - You are currently on fentanyl 25mcg patch which you would like to wean off. - Apply 12mcg fentanyl patch for 3 days x2 and then remove patch next Monday and substitute with tramadol 50 mg three times a day, the day after you remove the patch. 2. Abdominal pain  - You have sporadic pain in your upper/ mid abdomen that feels like your turning inside out. - Let us start keeping a detailed journal as to when you are dealing with this pain and what you ate, what you were doing, etc. Please not the same for episodes of vomiting as well. 3. You have 2 liquid bowel movements everyday. 4. We reviewed the need for an AMD and completed an AMD naming your wife as your mPOA.     This is what you have shared with us about Advance Care Planning:      Primary Decision Maker: Yenny Ortega Spouse - [phone]    Secondary Decision Maker: Ridge Cristina III - Child - [phone]    Supplemental (Other) Decision Maker: Amaury Mcarthur Child - [phone]  Advance Care Planning 2020   Patient's [address] is: Verbal statement (Legal Next of Kin remains as decision maker)   Primary Decision Maker Name -   Primary Decision Maker Phone Number -   Primary Decision Maker Relationship to Patient -   Confirm Advance Directive None   Patient Would Like to Complete Advance Directive Yes   Does the patient have other document types -           The Palliative Medicine Team is here to support you and your family. Sincerely,      Roel Mcdonald MD and the Palliative Medicine Team       GOALS OF CARE / TREATMENT PREFERENCES:   [====Goals of Care====]  GOALS OF CARE:  Patient / health care proxy stated goals: See Patient Instructions / Summary    TREATMENT PREFERENCES:   Code Status:  [x] Attempt Resuscitation       [] Do Not Attempt Resuscitation    Advance Care Planning:  [x] The [place] Interdisciplinary Team has updated the ACP Navigator with Decision Maker and Patient Capacity      Primary Decision MakerBting Marie - [phone]    Secondary Decision Maker: Ridge Cristina III - Child - [phone]    Supplemental (Other) Decision Maker: Jenn Krishna Child - [phone]  Advance Care Planning 2020   Patient's Healthcare Decision Maker is: Verbal statement (Legal Next of Kin remains as decision maker)   Primary Decision Maker Name -   Primary Decision [facility] Phone Number -   Primary Decision Maker Relationship to Patient -   Confirm Advance Directive None   Patient Would Like to Complete Advance Directive Yes   Does the patient have other document types -       Other:  (If patient appropriate for POST, consider using PALLPOST smart phrase here)    The palliative care team has discussed with patient / health care proxy about goals of care / treatment preferences for patient.  [====Goals of Care====]     PRESCRIPTIONS GIVEN:     Medications Ordered Today   Medications    fentaNYL (Duragesic) 12 mcg/hr patch     Si Patch by TransDERmal route every seventy-two (72) hours for 15 days. Max Daily Amount: 1 Patch.      Dispense:  5 Patch     Refill:  0           FOLLOW UP:     Future Appointments   Date Time Provider Sloane Roach   2020  1:50 PM MD Juliocesar Wilks   2020  8:30 AM Roel Mcdonald MD [facility]   2020  9:45 AM Cisco Bunn MD [facility].    2020 11:00 AM Tk Crockett MELVI[facility] NP [facility]           PHYSICIANS INVOLVED IN CARE:   Patient Care Team:  Bee Cancino DO as PCP - General (Internal Medicine)  Bee Cancino DO as PCP - [facility] Empaneled Provider  Pretty Terrell MD (General Surgery)  Karley Reis MD (Gastroenterology)  Kelsea Terrell MD (Gastroenterology)  Naa Rogel MD (Hematology and Oncology)       HISTORY:   Reviewed patient-completed ESAS and advance care planning form. Reviewed patient record in prescription monitoring program.    CHIEF COMPLAINT: No chief complaint on file. HPI/SUBJECTIVE:    The patient is: [x] Verbal / [] Nonverbal     Patient here with his wife. Both are very good historians. Mid upper back pain-much improved since radiation to the periaortic mass. He struggled with pain for several months before it was identified as cancer recurrence. He was started on fentanyl 25 mcg patch which he wants to wean off of. He has made upper and mid zone abdominal pain which comes and goes. He has not noticed any specific pattern to the pain but usually the pain comes on before vomiting or relieves without vomiting. Sometimes, he vomits food that he ate about 12 to 14 hours ago. No constipation. He has 2 liquid bowel movements every day. He is unable to tolerate eggs or honey. He is getting tests done to evaluate his pancreatic enzymes. He has very good support through his wife.     Clinical Pain Assessment (nonverbal scale for nonverbal patients):   [++++ Clinical Pain Assessment++++]  [++++Pain Severity++++]: Pain: 2  [++++Pain Character++++]: cramping  [++++Pain Duration++++]: months  [++++Pain Effect++++]: functional   [++++Pain Factors++++]: none in particular  [++++Pain Frequency++++]: on and off  [++++Pain Location++++]: upper abdomen and mid back  [++++ Clinical Pain Assessment++++]       FUNCTIONAL ASSESSMENT:     Palliative Performance Scale (PPS):  PPS: 70       PSYCHOSOCIAL/SPIRITUAL SCREENING:     Any spiritual / Voodoo concerns:  [] Yes /  [x] No    Caregiver Burnout:  [] Yes /  [x] No /  [] No Caregiver Present      Anticipatory grief assessment:   [x] Normal  / [] Maladaptive       ESAS Anxiety: Anxiety: 1    ESAS Depression: Depression: 0       REVIEW OF SYSTEMS:     The following systems were [x] reviewed / [] unable to be reviewed  Systems: constitutional, ears/nose/mouth/throat, respiratory, gastrointestinal, genitourinary, musculoskeletal, integumentary, neurologic, psychiatric, endocrine. Positive findings noted below. Modified ESAS Completed by: provider   Fatigue: 5 Drowsiness: 4   Depression: 0 Pain: 2   Anxiety: 1 Nausea: 10   Anorexia: 3 Dyspnea: 0   Best Well-Bein Constipation: No   Other Problem (Comment): 0          PHYSICAL EXAM:     Wt Readings from Last 3 Encounters:   20 151 lb (68.5 kg)   20 152 lb (68.9 kg)   20 157 lb 12.8 oz (71.6 kg)     Blood pressure 102/65, pulse 78, temperature 97.8 °F (36.6 °C), temperature source Oral, resp. rate 18, height 5' 8\" (1.727 m), weight 151 lb (68.5 kg), SpO2 96 %. Last bowel movement: See Nursing Note    Constitutional: Alert, oriented  Eyes: pupils equal, anicteric  ENMT: no nasal discharge, moist mucous membranes  Cardiovascular: regular rhythm, distal pulses intact  Respiratory: breathing not labored, symmetric  Gastrointestinal: soft non-tender, +bowel sounds  Musculoskeletal: no deformity, no tenderness to palpation  Skin: warm, dry  Neurologic: following commands, moving all extremities  Psychiatric: full affect, no hallucinations  Other:       HISTORY:     Past Medical History:   Diagnosis Date    Arrhythmia     Previous a.fib, ablation, NSR now; NO LONGER FOLLOWED BY CARDIOLOGIST.     Autoimmune disease ([address]     SJOGREN'S    Cancer ([address]     COMMON BILE DUCT, ADENOCARCINOMA    Diabetes ([address]     Family history of skin cancer     Hypertension     Sepsis ([address] 2017    STENTING TO BILE DUCT    Status post chemotherapy     Stroke [facility]) 2008    brain aneurysm - no deficits    Sun-damaged skin     Sunburn, blistering       Past Surgical History:   Procedure Laterality Date    ABDOMEN SURGERY PROC UNLISTED  10/2017    [facility]      COLONOSCOPY, POLYPS (BENIGN)    HX GI  10/06/2017    [facility]     HX HEART CATHETERIZATION  2005    Ablation     HX ORTHOPAEDIC  2000    Spinal fusion W/ HARDWARE    HX OTHER SURGICAL  11/07/2017    [place] Cath, Dr. Prado Apo  2017    PORTACATH - then removed    NEUROLOGICAL PROCEDURE UNLISTED  2005    Ting hole washout from cerebral hemorrhage      Family History   Problem Relation Age of Onset    Cancer Father         Breast and Colon    Cancer Mother         LEUKEMIA    No Known Problems Sister     No Known Problems Brother     No Known Problems Sister     Anesth Problems Neg Hx       History reviewed, no pertinent family history. Social History     Tobacco Use    Smoking status: Never Smoker    Smokeless tobacco: Never Used   Substance Use Topics    Alcohol use: Never     Frequency: Never     Comment: very rare     Allergies   Allergen Reactions    Shellfish Derived Anaphylaxis     Soft shell crabs    Morphine Nausea and Vomiting    Pcn [Penicillins] Other (comments)     Pt stated \"always been told PCN\"  Pt tolerated other cephalosporins in the past      Current Outpatient Medications   Medication Sig    fentaNYL (Duragesic) 12 mcg/hr patch 1 Patch by TransDERmal route every seventy-two (72) hours for 15 days. Max Daily Amount: 1 Patch.  gabapentin (NEURONTIN) 100 mg capsule Take  by mouth two (2) times a day. Not sure of dose but takes 3 pills 2 times a day    ramipriL (ALTACE) 10 mg capsule TAKE 1 CAPSULE BY MOUTH EVERY DAY AT NIGHT    traMADoL (ULTRAM) 50 mg tablet Take 1 Tab by mouth every six (6) hours as needed for Pain for up to 30 days.  Max Daily Amount: 200 mg.    fentaNYL (DURAGESIC) 25 mcg/hr PATCH 1 Patch by TransDERmal route every seventy-two (72) hours for 30 days. Max Daily Amount: 1 Patch.  ondansetron (ZOFRAN ODT) 4 mg disintegrating tablet Take 1 Tab by mouth every eight (8) hours as needed for Nausea.  acetaminophen (TYLENOL) 325 mg tablet Take 2 Tabs by mouth every four (4) hours as needed for Pain or Fever (Over the counter medication).  omeprazole (PRILOSEC) 40 mg capsule Take 1 Cap by mouth daily.  empagliflozin (JARDIANCE) 25 mg tablet Take 1 Tab by mouth daily.  sildenafil citrate (VIAGRA) 50 mg tablet Take 1 Tab by mouth as needed (ED).  tamsulosin (FLOMAX) 0.4 mg capsule TAKE ONE CAPSULE BY MOUTH EVERY EVENING    cyanocobalamin (VITAMIN B12) 1,000 mcg/mL injection INJECT CONTENTS OF ONE VIAL INTRAMUSCULARLY EVERY OTHER WEEK    alpha-D-galactosidase (BEANO PO) Take 1 Tab by mouth two (2) times a day.  cetirizine (ZYRTEC) 10 mg tablet Take 10 mg by mouth nightly.  sucralfate (CARAFATE) 1 gram tablet Take 1 g by mouth four (4) times daily. No current facility-administered medications for this visit. LAB DATA REVIEWED:     Lab Results   Component Value Date/Time    WBC 6.1 01/02/2020 03:24 AM    HGB 13.4 01/02/2020 03:24 AM    PLATELET 706 (L) 39/51/7791 03:24 AM     Lab Results   Component Value Date/Time    Sodium 142 01/02/2020 03:24 AM    Potassium 3.4 (L) 01/02/2020 03:24 AM    Chloride 111 (H) 01/02/2020 03:24 AM    CO2 25 01/02/2020 03:24 AM    BUN 8 01/02/2020 03:24 AM    Creatinine 0.76 01/02/2020 03:24 AM    Calcium 8.5 01/02/2020 03:24 AM    Magnesium 2.4 12/31/2019 07:37 AM    Phosphorus 4.0 08/18/2018 04:20 AM      Lab Results   Component Value Date/Time    AST (SGOT) 12 (L) 01/02/2020 03:24 AM    Alk.  phosphatase 106 01/02/2020 03:24 AM    Protein, total 5.7 (L) 01/02/2020 03:24 AM    Albumin 3.0 (L) 01/02/2020 03:24 AM    Globulin 2.7 01/02/2020 03:24 AM     Lab Results   Component Value Date/Time    INR 1.0 12/04/2019 08:21 AM Prothrombin time 10.6 12/04/2019 08:21 AM    aPTT 27.4 09/29/2017 09:20 AM      Lab Results   Component Value Date/Time    Iron 31 (L) 01/02/2020 03:24 AM    TIBC 245 (L) 01/02/2020 03:24 AM    Iron % saturation 13 (L) 01/02/2020 03:24 AM    Ferritin 122 01/02/2020 03:24 AM           CONTROLLED SUBSTANCES SAFETY ASSESSMENT (IF ON CONTROLLED SUBSTANCES):     Reviewed opioid safety handout:  [x] Yes   [] No  24 hour opioid dose >150mg morphine equivalent/day:  [] Yes   [x] No  Benzodiazepines:  [] Yes   [x] No  Sleep apnea:  [] Yes   [x] No  Urine Toxicology Testing within last 6 months:  [] Yes   [x] No  History of or new aberrant medication taking behaviors:  [] Yes   [x] No  Has Narcan been prescribed [x] Yes   [] No          Total time: 70 minutes  Counseling / coordination time:   > 50% counseling / coordination?:

## 2020-04-06 NOTE — PATIENT INSTRUCTIONS
Dear Remberto Kaufman , It was a pleasure seeing you today at [facility] office We will see you again in 4 weeks If labs or imaging tests have been ordered for you today, please call the office  at [phone] 48 hours after completion to obtain the results. Your stated goal:  
- better pain management Your described symptoms were: Fatigue: 5 Drowsiness: 4 Depression: 0 Pain: 2 Anxiety: 1 Nausea: 10 Anorexia: 3 Dyspnea: 0 Best Well-Bein Constipation: No  
Other Problem (Comment): 0 This is the plan we talked about: 1. Mid back pain - This pain is much better with SBRT to the back which you completed 2 weeks ago. - You are currently on fentanyl 25mcg patch which you would like to wean off. - Apply 12mcg fentanyl patch for 3 days x2 and then remove patch next Monday and substitute with tramadol 50 mg three times a day, the day after you remove the patch. 2. Abdominal pain - You have sporadic pain in your upper/ mid abdomen that feels like your turning inside out. - Let us start keeping a detailed journal as to when you are dealing with this pain and what you ate, what you were doing, etc. Please not the same for episodes of vomiting as well. 3. You have 2 liquid bowel movements everyday. 4. We reviewed the need for an AMD and completed an AMD naming your wife as your mPOA. This is what you have shared with us about Advance Care Planning: 
 
  Primary Decision Maker: Calin Campbell Spouse - [phone] Secondary Decision Maker: Iftikhar Dill - Child - [phone] Supplemental (Other) Decision Maker: Roberto Cristina - Child - [phone] Advance Care Planning 2020 Patient's Healthcare Decision Maker is: Verbal statement (Legal Next of Kin remains as decision maker) Primary Decision Maker Name -  
Primary Decision Maker Phone Number -  
Primary Decision Maker Relationship to Patient -  
Confirm Advance Directive None Patient Would Like to Complete Advance Directive Yes Does the patient have other document types - The Palliative Medicine Team is here to support you and your family.   
 
 
Sincerely, 
 
 
Jania Brambila MD and the Palliative Medicine Team

## 2020-04-07 ENCOUNTER — OFFICE VISIT (OUTPATIENT)
Dept: SURGERY | Age: 65
End: 2020-04-07

## 2020-04-07 DIAGNOSIS — C22.1 CHOLANGIOCARCINOMA OF BILIARY TRACT (HCC): Primary | ICD-10-CM

## 2020-04-07 NOTE — LETTER
4/7/20 Patient: Violeta Kuhn YOB: 1955 Date of Visit: 4/7/2020 Jeanette Rosenthal DO 
[address] [address] VIA In Basket Dear Jeanette Rosenthal DO, Thank you for referring Mr. Lashonda Tian to [facility] [address] [facility] for evaluation. My notes for this consultation are attached. If you have questions, please do not hesitate to call me. I look forward to following your patient along with you.  
 
 
Sincerely, 
 
Brionna Knight MD

## 2020-04-07 NOTE — PROGRESS NOTES
I was in the office while conducting this encounter. Consent:  He and/or his healthcare decision maker is aware that this patient-initiated Telehealth encounter is a billable service, with coverage as determined by his insurance carrier. He is aware that he may receive a bill and has provided verbal consent to proceed: Yes    This virtual visit was conducted telephone encounter only. -  I affirm this is a Patient Initiated Episode with an Established Patient who has not had a related appointment within my department in the past 7 days or scheduled within the next 24 hours. Note: this encounter is not billable if this call serves to triage the patient into an appointment for the relevant concern. Total Time: minutes: 5-10 minutes. Mary Chino MD       [facility]      Clinic Note - Follow up    Farzaneh was called for scheduled follow up today. His encounter was completed via telephone due to the ongoing Covid-19 concerns. The patient underwent a repeat ERP with pancreatic stent placement for a strictured pancreaticojejunostomy anastomosis. This was done on 4/1/2020 by Dr. Rachele Flores. The patient states he has done well since this procedure. He has had no further recurrent episodes of pancreatitis since he was last seen. He continues to have 1-2 episodes of bilious type emesis per day. He is maintaining his weight and eating well otherwise. He has completed radiation treatment to his area of recurrence in the retroperitoneum. He has plans for follow-up with CT imaging with Dr. Paulette Turcios in a couple of months. He otherwise has no other new complaints. Objective     Not Performed via telephone encounter      Ingrid Haas is a 62 y. o.yr old male known to me for a history of distal cholangiocarcinoma. He is s/p a pylorus preserving whipple procedure on 10/6/17, final pathology [id number]. He completed adjuvant therapy after this. He has had issues with an anastomotic G-J stricture and more recently has had recurrent pancreatitis. He had an ERP with pancreatic stent placement which was repeated on 4/1/20. He has been doing well from a pancreatitis standpoint since the stent was placed. He has also had stereotactic radiation therapy for localized recurrence of his cholangiocarcinoma along the retroperitoneum. Plan     I discussed with the patient that I would like for his pancreatic stricture to be fully addressed prior to revising his Whipple reconstruction anatomy. This may make it difficult to access with endoscopic approach in the future so I would like for him to complete his treatment with Dr. Lynda Contreras prior to considering this. I would also like for the patient to have follow-up imaging with Dr. Angeles Olson as planned to ensure he does not have progression of his recurrent disease before embarking on a surgical intervention. The patient is in agreement with this and I will plan to see him back in August after his next ERP to discuss potential surgical intervention at that time. What we had discussed was possibly creating a Stevens enteroenterostomy to decrease likelihood of bile reflux into the stomach.         Ailin Fabian MD  4/7/2020    CC: Ruel Murry

## 2020-04-08 ENCOUNTER — DOCUMENTATION ONLY (OUTPATIENT)
Dept: PALLATIVE CARE | Age: 65
End: 2020-04-08

## 2020-04-08 NOTE — PROGRESS NOTES
[facility]  Palliative Medicine Outpatient   Psychosocial Assessment  [phone]      Time in:  2:20pm  Time out:  2:50pm    Reason for Assessment:      [x] Initial Social Work Encounter  [] Continued Social Work Assessment from previous encounter    Sources of Information:    [x]Patient  []Family  []Staff  [x]Medical Record    Narrative:   Mr. Reyes Quan is a 61 y/o man whose pmh includes:  Past Medical History:   Diagnosis Date    Arrhythmia     Previous a.fib, ablation, NSR now; NO LONGER FOLLOWED BY CARDIOLOGIST.  Autoimmune disease ([facility] [address]     SJOGREN'S    Cancer ([facility] [address]     COMMON BILE DUCT, ADENOCARCINOMA    Diabetes ([facility] [address]     Family history of skin cancer     Hypertension     Sepsis ([facility] [address] 2017    STENTING TO BILE DUCT    Status post chemotherapy     Stroke [facility]) 2008    brain aneurysm - no deficits    Sun-damaged skin     Sunburn, blistering       He lives at home with his wife of 22 years, Jovita Carson. They have 5 grown sons between them:  Pt's 2 sons are Mabel Lazar, and Rubi. Wife's sons are Brayan Choi, Marlys Stapleton, and Johnathan Álvarez. Pt states they are a close family and that his wife and sons are having more difficulty with his diagnosis than he is. Pt spoke of being glad [facility] because the readjustment of meds has helped with his pain. Pt talked about his belief he will live longer, but if not, he is a [place] and ready for death. Pt stated he has made his estate planning in order to be prepared. Assessment / Action:  · SW encouraged expression while actively listening. · Introduced Palliative Social Work as part of the PM supportive team by providing emotional support, resource referrals, advocacy, assistance with AMDs and counseling. · Normalized pt's sense of peace re: his illness when family members are not in that place yet. Pt said his wife feels he will leave her alone. He said will joke with her, but she is struggling.   SW offered to to follow up with is wife, which pt said would be helpful. · Provided affirmation for pt's taking care of estate planning. · Explored support system, learning about family. Pt said they just started going to a new [employer] and are just getting to know the members. HE is pleased that they call to check on his wife and him. Advance Care Planning:    Primary Decision Maker: Santa Whitehead - [phone]    Secondary Decision Maker: Ridge Cristina III - Child - [phone]    Supplemental (Other) Decision Maker: Eli Patel Child - [phone]  Advance Care Planning 4/6/2020   Patient's Healthcare Decision Maker is: Verbal statement (Legal Next of Kin remains as decision maker)   Primary Decision Maker Name -   Primary Decision Maker Phone Number -   Primary Decision Maker Relationship to Patient -   Confirm Advance Directive None   Patient Would Like to Complete Advance Directive Yes   Does the patient have other document types -       Mental Status:    [x]Alert  []Lethargic  []Unresponsive  Oriented to:  [x]Person  [x]Place  [x]Time  [x]Situation      Barriers to Learning:    []Language  []Developmental  []Cognitive  []Altered Mental Status  []Forgetful []Visual/Hearing Impairment  []Unable to Read/Write  []Motivational   [x]No Barriers Identified  []Other:    Relationship Status:  []Single  [x]  []Significant Other/Life Partner  []  []  []      Living Circumstances:  []Lives Alone  [x]Family/Significant Other in Household  []Roommates  []Children in the Home  []Paid Caregivers  []Assisted Living Facility/Group Home  []Skilled [address]  []Homeless  []Incarcerated  []Environmental/Care Concerns  []Transportation Issues  [] Issues  []Domestic Violence []Other:    Support System:    [x]Strong  []Fair  []Limited    Sleep Habits:   []Poor []Unsatisfactory [x]Satisfactory []Good []Very Good   Specific sleep problems?  Awakens with pain, but is adjusting when to take his tramadol to get more sleep    Financial/Legal Concerns:    []Uninsured  []Limited Income/Resources  []Non-Citizen  []Utility Issues  []Food Insecurity [x]No Concerns Identified  []Financial POA:    []Other:    Sabianist/Spiritual/Existential:  []Strong Sense of Spirituality  [x]Involved in [employer]  []Request  Visit  []Expressing Lilburn Oneal  []No Concerns Identified    Coping with Illness:         Patient: Family/Caregiver:   Understanding and Acceptance of Illness/Prognosis  [x] []   Strong Sense of Resilience [x] []   Self Reflection [x] []   Engaged Support System [x] []   Does not Readily Discuss Illness [] []   Denial of Terminal Status [] []   Anger [] []   Depression [] []   Anxiety/Fear [] []   Bargaining [] []   Recent Diagnosis/Prognosis [] []   Difficulties with Body Image [] []   Loss of Identity [] []   Excessive Substance Use [] []   Mental Health History [] []   Enmeshed Relationships [] []   History of Loss [] []   Anticipatory Grief [] []   Concern for Complicated Grief [] []   Suicidal Ideation or Plan [] []   Caregiver Stress [] []   Unable to assess [] [x]     Goals/Plan:   Follow up with pt's wife to provide support and assess CG stress. Ongoing psychosocial support including assessments, links with resources and counseling prn.      China Stearns, JASKARAN, MSSW, LCSW  Palliative   [facility] Palliative Medicine  [phone]

## 2020-04-09 ENCOUNTER — TELEPHONE (OUTPATIENT)
Dept: PALLATIVE CARE | Age: 65
End: 2020-04-09

## 2020-04-09 NOTE — TELEPHONE ENCOUNTER
----- Message from Breann Cristina sent at 4/8/2020  4:56 PM EDT -----  Regarding: Visit Follow-Up Question  Contact: [phone]  Ton De Anda,  [facility] AT [employer] CL[facility] you are well! Gavin Byers is having back pain of a 4-5 with a decrease to a 2, 30 minutes after the Tramadol  since we decreased the Fentanyl patch to 12 mcg/hr. He is taking Tramadol 50 mg TID. He has 20 tablets left from the 3/17/20 RX fill with no refills. He is scheduled to have the fentanyl 12 mcg/hr patched changed on 4/9/20. He is surprised how much the Fentanyl 25mcg patch was keeping him pain free from the back pain. Your recommendations would be greatly appreciated.   Manish 60

## 2020-04-09 NOTE — TELEPHONE ENCOUNTER
Returned call to patient and he stated that his pain is manageable with the decrease dose in Fentanyl, but he is taking Tramadol 3 x/day. Patient inquired should he continue the weaning process of the Fentanyl Patch or increase back to 25 mcg. Advised as discussed during his office visit - MD had advised that pain may increase with the decrease in Fentanyl. Patient verbalized understanding - he stated that pain is manageable. He will change Fentanyl patch today, and apply new patch Sunday evening. He will call on Monday with update as he will need refill on Tramadol if he continue with the weaning of the Patch.

## 2020-04-13 ENCOUNTER — TELEPHONE (OUTPATIENT)
Dept: PALLATIVE CARE | Age: 65
End: 2020-04-13

## 2020-04-13 DIAGNOSIS — C24.9 CHOLANGIOCARCINOMA DETERMINED BY BIOPSY OF BILIARY TRACT (HCC): ICD-10-CM

## 2020-04-13 RX ORDER — FENTANYL 25 UG/1
1 PATCH TRANSDERMAL
Qty: 10 PATCH | Refills: 0 | Status: SHIPPED | OUTPATIENT
Start: 2020-04-16 | End: 2020-05-26 | Stop reason: SDUPTHER

## 2020-04-13 NOTE — TELEPHONE ENCOUNTER
----- Message from Breann Cristina sent at 4/13/2020  8:35 AM EDT -----  Regarding: Prescription Question  Contact: [phone]  Hell[address] Dr. Mckee Police,  [facility] [employer] CL[facility] you are well. The pain continues in my back of a 3-4. He has 8 of the  tramadol 50mg tablet. He had appointments with Dr Amina Saba and Dr. Amy Menezes on Friday, April 10th and they both recommended that he go back on the DAYBREAK OF Eyak 25mcg/hr patch for now. They said they would reach out to talk to you. Please let us know how to proceed. Thank you!   Manish 60

## 2020-04-17 ENCOUNTER — DOCUMENTATION ONLY (OUTPATIENT)
Dept: SURGERY | Age: 65
End: 2020-04-17

## 2020-04-24 ENCOUNTER — DOCUMENTATION ONLY (OUTPATIENT)
Dept: PALLATIVE CARE | Age: 65
End: 2020-04-24

## 2020-04-24 NOTE — PROGRESS NOTES
Casey Ramesh [employer] Work  [phone]    Narrative  LCSW reached out to pt's wife, Moises Ruiz, to introduce self and provide support for caregiver stress. Wife has many resources at her fingertips to help with stress as she is a mental health instructor for nursing students. \"I teach effects of social isolation, but never knew how it would affect me until now. \"  Wife said she is a member of [facility] on [address]) due to her interests as well as experiences in her family. She said she has great support from friends, family, and [employer]. Wife voiced understanding that Caregiver Stress is a real thing, expressing gratitude for this check in call. Plan  Ongoing psychosocial support as needed.     Irena Lyons, CCM, MSSW, LCSW  Palliative   [facility] Palliative Medicine  [phone]

## 2020-05-04 ENCOUNTER — TELEPHONE (OUTPATIENT)
Dept: PALLATIVE CARE | Age: 65
End: 2020-05-04

## 2020-05-04 NOTE — TELEPHONE ENCOUNTER
Calling patient to advise of Virtual Visit with Dr. Jamee Shaikh on 5/6/2020 AT 8:30am.  Advised nurse would call around 8:00am to start visit. Patient confirmed.

## 2020-05-06 ENCOUNTER — VIRTUAL VISIT (OUTPATIENT)
Dept: PALLATIVE CARE | Age: 65
End: 2020-05-06

## 2020-05-06 VITALS
WEIGHT: 146 LBS | SYSTOLIC BLOOD PRESSURE: 102 MMHG | BODY MASS INDEX: 22.13 KG/M2 | TEMPERATURE: 98 F | DIASTOLIC BLOOD PRESSURE: 76 MMHG | HEIGHT: 68 IN | RESPIRATION RATE: 18 BRPM

## 2020-05-06 DIAGNOSIS — C24.9 CHOLANGIOCARCINOMA DETERMINED BY BIOPSY OF BILIARY TRACT (HCC): ICD-10-CM

## 2020-05-06 DIAGNOSIS — K59.1 FUNCTIONAL DIARRHEA: ICD-10-CM

## 2020-05-06 DIAGNOSIS — R10.10 PAIN OF UPPER ABDOMEN: Primary | ICD-10-CM

## 2020-05-06 DIAGNOSIS — R11.2 INTRACTABLE NAUSEA AND VOMITING: ICD-10-CM

## 2020-05-06 RX ORDER — PANCRELIPASE 36000; 180000; 114000 [USP'U]/1; [USP'U]/1; [USP'U]/1
2-3 CAPSULE, DELAYED RELEASE PELLETS ORAL
COMMUNITY
Start: 2020-04-10 | End: 2020-12-21 | Stop reason: SDUPTHER

## 2020-05-06 RX ORDER — CHOLESTYRAMINE 4 G/4.8G
4 POWDER, FOR SUSPENSION ORAL 3 TIMES DAILY
COMMUNITY
End: 2020-09-02

## 2020-05-06 RX ORDER — FINASTERIDE 5 MG/1
TABLET, FILM COATED ORAL
COMMUNITY
Start: 2020-04-13 | End: 2022-01-01 | Stop reason: ALTCHOICE

## 2020-05-06 NOTE — PATIENT INSTRUCTIONS
Dear Xavi So , It was a pleasure seeing you today in your home along with your wife virtually We will see you again in 8 weeks If labs or imaging tests have been ordered for you today, please call the office  at [phone] 48 hours after completion to obtain the results. Your stated goal:  
- better pain management Your described symptoms were: Fatigue: 6 Drowsiness: 5 Depression: 0 Pain: 3 Anxiety: 0 Nausea: 8 Anorexia: 3 Dyspnea: 1 Best Well-Bein Constipation: No  
Other Problem (Comment): 0 This is the plan we talked about: 1. Mid back pain - This pain is much better with SBRT to the back which you completed 2 weeks ago. 
-We tried to wean you off of the fentanyl to see how you did but the pain returned and you did not do well. We decided to go back on fentanyl 25 mcg every 72 hours. I have provided you with a refill. 
-You use tramadol 50 mg up to 3 times a day only on bad days. You do not use this on a regular basis. 2. Functional diarrhea 
-You continue to have loose stools. You are now on pancreatic enzymes and cholestyramine. Let us see how this helps. - We talked about drinking coconut water and lemonade to help with electrolyte replacement. 3.  Nausea and vomiting 
-We have now identified that the nausea and vomiting is very much related to regurgitation of undigested food back into your stomach. We think this is because of the anatomical disturbance that the Whipple procedure has caused and that is why her medications are not very helpful. Zofran still helps with the nausea but you always feel relieved only after vomiting. 4. We completed an AMD naming your wife as mPOA. 5.  Next steps 
-You are meeting with Dr. Katiana Connelly in August to discuss reversal of Whipple procedure.   You want to hold off until the COVID 19 curve flattens in [place] before the elective surgery but convinced that the surgery will be the answer to your vomiting problem. This is what you have shared with us about Advance Care Planning: 
 
  Primary Decision Maker: Rosa Yoon Spouse - [phone] Secondary Decision Maker: Analia Challenger - Child - [phone] Supplemental (Other) Decision Maker: Roberto Cristina - Child - [phone] Advance Care Planning 5/6/2020 Patient's Healthcare Decision Maker is: Named in scanned ACP document Primary Decision Maker Name -  
Primary Decision Maker Phone Number -  
Primary Decision Maker Relationship to Patient -  
Confirm Advance Directive Yes, on file Patient Would Like to Complete Advance Directive - Does the patient have other document types - The Palliative Medicine Team is here to support you and your family.   
 
 
Sincerely, 
 
 
Tana Zhu MD and the Palliative Medicine Team

## 2020-05-06 NOTE — PROGRESS NOTES
Palliative Medicine Outpatient Services  Carolynn: [phone] ([phone])    Patient Name: Lidia Herrera  YOB: 1955    Date of Current Visit: 05/06/20  Location of Current Visit:    [] [facility] Office  [] [facility] Office  [] [facility] Office  [] Home  [x] Other: Virtual visit     Date of Initial Visit: 4/6/2020  Referral from: Dr. Farrel Saint  Primary Care Physician: Mauro Palacios DO      SUMMARY:   Lidia Herrera is a 62y.o. 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 Farrel Saint for management of symptoms and psychosocial support. 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 This is second marriage for both of them. Current treatment-completed RT to the para-aortic mass, pursuing diagnostics with GI physician Dr. Radha Magallanes for gastroparesis and pancreatic enzymes, has had biliary stents replaced twice, third 1 scheduled for August 2020. Patient wants reversal of his Whipple after that. He follows closely with Dr. Yelitza Vazquez with general surgery. PALLIATIVE DIAGNOSES:       ICD-10-CM ICD-9-CM    1. Pain of upper abdomen R10.10 789.09    2. Intractable nausea and vomiting R11.2 536.2    3. Functional diarrhea K59.1 564.5    4.  Cholangiocarcinoma determined by biopsy of biliary tract ([facility] [address] C24.9 156.9           PLAN:   Patient Instructions     Dear Lidia Herrera ,    It was a pleasure seeing you today in your home along with your wife virtually    We will see you again in 8 weeks    If labs or imaging tests have been ordered for you today, please call the office  at [phone] 48 hours after completion to obtain the results. Your stated goal:   - better pain management    Your described symptoms were: Fatigue: 6 Drowsiness: 5   Depression: 0 Pain: 3   Anxiety: 0 Nausea: 8   Anorexia: 3 Dyspnea: 1   Best Well-Bein Constipation: No   Other Problem (Comment): 0       This is the plan we talked about:    1. Mid back pain  - This pain is much better with SBRT to the back which you completed 2 weeks ago.  -We tried to wean you off of the fentanyl to see how you did but the pain returned and you did not do well. We decided to go back on fentanyl 25 mcg every 72 hours. I have provided you with a refill.  -You use tramadol 50 mg up to 3 times a day only on bad days. You do not use this on a regular basis. 2. Functional diarrhea  -You continue to have loose stools. You are now on pancreatic enzymes and cholestyramine. Let us see how this helps. - We talked about drinking coconut water and lemonade to help with electrolyte replacement. 3.  Nausea and vomiting  -We have now identified that the nausea and vomiting is very much related to regurgitation of undigested food back into your stomach. We think this is because of the anatomical disturbance that the Whipple procedure has caused and that is why her medications are not very helpful. Zofran still helps with the nausea but you always feel relieved only after vomiting. 4. We completed an AMD naming your wife as Nikko. 5.  Next steps  -You are meeting with Dr. Hubert Myers in August to discuss reversal of Whipple procedure. You want to hold off until the COVID 19 curve flattens in [place] before the elective surgery but convinced that the surgery will be the answer to your vomiting problem.       This is what you have shared with us about Advance Care Planning:      Primary Decision Maker: Richar Mercury - [phone]    Secondary Decision Maker: Miriam Garza III - Child - [phone]    Supplemental (Other) Decision Maker: Amaury Mcarthur Child - [phone]  Advance Care Planning 5/6/2020   Patient's Healthcare Decision Maker is: Named in scanned ACP document   Primary Decision Maker Name -   Primary Decision Maker Phone Number -   Primary Decision Maker Relationship to Patient -   Confirm Advance Directive Yes, on file   Patient Would Like to Complete Advance Directive -   Does the patient have other document types -           The Palliative Medicine Team is here to support you and your family.        Sincerely,      Yari Johansen MD and the Palliative Medicine Team       GOALS OF CARE / TREATMENT PREFERENCES:   [====Goals of Care====]  GOALS OF CARE:  Patient / health care proxy stated goals: See Patient Instructions / Summary    TREATMENT PREFERENCES:   Code Status:  [x] Attempt Resuscitation       [] Do Not Attempt Resuscitation    Advance Care Planning:  [x] [facility] Interdisciplinary Team has updated the ACP Navigator with Decision Maker and Patient Capacity      [facility] [phone]    Secondary Decision Maker: Ridge Cristina III - Child - [phone]    Supplemental (Other) Decision Maker: Amaury Mcarthur Child - [phone]  Advance Care Planning 5/6/2020   Patient's Healthcare Decision Maker is: Named in scanned ACP document   Primary Decision Maker Name -   Primary Decision [facility] Phone Number -   Primary Decision Maker Relationship to Patient -   Confirm Advance Directive Yes, on file   Patient Would Like to Complete Advance Directive -   Does the patient have other document types -       Other:  (If patient appropriate for POST, consider using PALLPOST smart phrase here)    The palliative care team has discussed with patient / health care proxy about goals of care / treatment preferences for patient.  [====Goals of Care====]     PRESCRIPTIONS GIVEN:     No orders of the defined types were placed in this encounter. FOLLOW UP:     Future Appointments   Date Time Provider Sloane Roach   5/13/2020  8:00 AM [facility] ER 3 [facility]   5/22/2020  9:45 AM MD [address]   7/27/2020 11:00 AM Pedro Davies NP [facility] NURIS SCHED   8/6/2020  9:00 AM Nick Silverio  [address] IN CARE:   Patient Care Team:  Ava Paulson DO as PCP - General (Internal Medicine)  Ava Paulson DO as PCP - [facility] Empaneled Provider  Nick Silverio MD (General Surgery)  Elham Crespo MD (Gastroenterology)  Chuck Lomax MD (Gastroenterology)  Meredith Cervantes MD (Hematology and Oncology)       HISTORY:   Reviewed patient-completed ESAS and advance care planning form. Reviewed patient record in prescription monitoring program.    CHIEF COMPLAINT: No chief complaint on file. HPI/SUBJECTIVE:    The patient is: [x] Verbal / [] Nonverbal     Patient seen virtually at home. He c/o watery stools, feeling tired after. Abdominal and back pain increased with the decrease in Fentanyl and so we decided to go back on his previous dose. Continues to have nausea and vomiting, no specific pattern.        -------    Patient here with his wife. Both are very good historians. Mid upper back pain-much improved since radiation to the periaortic mass. He struggled with pain for several months before it was identified as cancer recurrence. He was started on fentanyl 25 mcg patch which he wants to wean off of. He has made upper and mid zone abdominal pain which comes and goes. He has not noticed any specific pattern to the pain but usually the pain comes on before vomiting or relieves without vomiting. Sometimes, he vomits food that he ate about 12 to 14 hours ago. No constipation. He has 2 liquid bowel movements every day. He is unable to tolerate eggs or honey.   He is getting tests done to evaluate his pancreatic enzymes. He has very good support through his wife. Clinical Pain Assessment (nonverbal scale for nonverbal patients):   [++++ Clinical Pain Assessment++++]  [++++Pain Severity++++]: Pain: 3  [++++Pain Character++++]: cramping  [++++Pain Duration++++]: months  [++++Pain Effect++++]: functional   [++++Pain Factors++++]: none in particular  [++++Pain Frequency++++]: on and off  [++++Pain Location++++]: upper abdomen and mid back  [++++ Clinical Pain Assessment++++]       FUNCTIONAL ASSESSMENT:     Palliative Performance Scale (PPS):  PPS: 70       PSYCHOSOCIAL/SPIRITUAL SCREENING:     Any spiritual / Christian concerns:  [] Yes /  [x] No    Caregiver Burnout:  [] Yes /  [x] No /  [] No Caregiver Present      Anticipatory grief assessment:   [x] Normal  / [] Maladaptive       ESAS Anxiety: Anxiety: 0    ESAS Depression: Depression: 0       REVIEW OF SYSTEMS:     The following systems were [x] reviewed / [] unable to be reviewed  Systems: constitutional, ears/nose/mouth/throat, respiratory, gastrointestinal, genitourinary, musculoskeletal, integumentary, neurologic, psychiatric, endocrine. Positive findings noted below. Modified ESAS Completed by: provider   Fatigue: 6 Drowsiness: 5   Depression: 0 Pain: 3   Anxiety: 0 Nausea: 8   Anorexia: 3 Dyspnea: 1   Best Well-Bein Constipation: No   Other Problem (Comment): 0          PHYSICAL EXAM:     Wt Readings from Last 3 Encounters:   20 146 lb (66.2 kg)   20 151 lb (68.5 kg)   20 152 lb (68.9 kg)     Blood pressure 102/76, temperature 98 °F (36.7 °C), temperature source Oral, resp. rate 18, height 5' 8\" (1.727 m), weight 146 lb (66.2 kg).   Last bowel movement: See Nursing Note    Constitutional    [x] Appears well-developed and well-nourished in no apparent distress    [] Abnormal:  Mental status  [x] Alert and awake  [x] Oriented to person/place/time  [x] Able to follow commands  [] Abnormal:   Eyes  [x] EOM normal   [x] Sclera normal   [x] No visible ocular discharge  [] Abnormal:   HENT  [x] Normocephalic, atraumatic  [x] Mouth/Throat: Moist mucous membranes   [x] External Ears normal  [] Abnormal:  Neck  [x] No visualized mass  [] Abnormal:  Pulmonary/Chest   [x] Respiratory effort normal  [x] No visualized signs of difficulty breathing or respiratory distress  [] Abnormal:  Musculoskeletal  [x] Normal gait with no signs of ataxia  [x] Normal range of motion of neck  [] Abnormal:  Neurological:   [x] No facial asymmetry (Cranial nerve 7 motor function)  [x] No gaze palsy  [] Abnormal:   Skin  [x] No significant exanthematous lesions or discoloration noted on facial skin  [] Abnormal:                                  Psychiatric  [x] Normal affect  [x] No hallucinations  [] Abnormal:    Other pertinent observable physical exam findings:    Due to this being a TeleHealth evaluation, many elements of the physical examination are unable to be assessed. HISTORY:     Past Medical History:   Diagnosis Date    Arrhythmia     Previous a.fib, ablation, NSR now; NO LONGER FOLLOWED BY CARDIOLOGIST.     Autoimmune disease ([facility] [address]     SJOGREN'S    Cancer ([facility] [address]     COMMON BILE DUCT, ADENOCARCINOMA    Diabetes ([facility] [address]     Family history of skin cancer     Hypertension     Sepsis ([facility] [address] 2017    STENTING TO BILE DUCT    Status post chemotherapy     Stroke [facility]) 2008    brain aneurysm - no deficits    Sun-damaged skin     Sunburn, blistering       Past Surgical History:   Procedure Laterality Date    ABDOMEN SURGERY PROC UNLISTED  10/2017    Viktor     HX GI      COLONOSCOPY, POLYPS (BENIGN)    HX GI  10/06/2017    [facility] [place]     [address]  2005    Ablation     HX ORTHOPAEDIC  2000    Spinal fusion W/ HARDWARE    HX OTHER SURGICAL  11/07/2017    [place] Cath, Dr. Bryan Wong  2017    PORTACATH - then removed    NEUROLOGICAL PROCEDURE UNLISTED  2005    [place] hole washout from cerebral hemorrhage Family History   Problem Relation Age of Onset    Cancer Father         Breast and Colon    Cancer Mother         LEUKEMIA    No Known Problems Sister     No Known Problems Brother     No Known Problems Sister     Anesth Problems Neg Hx       History reviewed, no pertinent family history. Social History     Tobacco Use    Smoking status: Never Smoker    Smokeless tobacco: Never Used   Substance Use Topics    Alcohol use: Never     Frequency: Never     Comment: very rare     Allergies   Allergen Reactions    Shellfish Derived Anaphylaxis     Soft shell crabs    Morphine Nausea and Vomiting    Pcn [Penicillins] Other (comments)     Pt stated \"always been told PCN\"  Pt tolerated other cephalosporins in the past      Current Outpatient Medications   Medication Sig    Creon 36,000-114,000- 180,000 unit cpDR capsule TAKE 2 3 CAPSULES WITH EACH MEAL AND 1 2 WITH SNACK    finasteride (PROSCAR) 5 mg tablet TAKE 1 TABLET BY MOUTH EVERY DAY    cholestyramine-aspartame (QUESTRAN LIGHT) 4 gram packet Take 4 g by mouth two (2) times a day.  traMADoL (ULTRAM) 50 mg tablet TAKE 1 TABLET BY MOUTH EVERY 6 HOURS AS NEEDED FOR PAIN ,MAX OF 4 TABS DAILY    fentaNYL (DURAGESIC) 25 mcg/hr PATCH 1 Patch by TransDERmal route every seventy-two (72) hours for 30 days. Max Daily Amount: 1 Patch.  gabapentin (NEURONTIN) 100 mg capsule Take  by mouth two (2) times a day. Not sure of dose but takes 3 pills 2 times a day    ramipriL (ALTACE) 10 mg capsule TAKE 1 CAPSULE BY MOUTH EVERY DAY AT NIGHT    ondansetron (ZOFRAN ODT) 4 mg disintegrating tablet Take 1 Tab by mouth every eight (8) hours as needed for Nausea.  acetaminophen (TYLENOL) 325 mg tablet Take 2 Tabs by mouth every four (4) hours as needed for Pain or Fever (Over the counter medication).  omeprazole (PRILOSEC) 40 mg capsule Take 1 Cap by mouth daily.  empagliflozin (JARDIANCE) 25 mg tablet Take 1 Tab by mouth daily.     sildenafil citrate (VIAGRA) 50 mg tablet Take 1 Tab by mouth as needed (ED).  tamsulosin (FLOMAX) 0.4 mg capsule TAKE ONE CAPSULE BY MOUTH EVERY EVENING    cyanocobalamin (VITAMIN B12) 1,000 mcg/mL injection INJECT CONTENTS OF ONE VIAL INTRAMUSCULARLY EVERY OTHER WEEK    alpha-D-galactosidase (BEANO PO) Take 1 Tab by mouth two (2) times a day.  cetirizine (ZYRTEC) 10 mg tablet Take 10 mg by mouth nightly.  sucralfate (CARAFATE) 1 gram tablet Take 1 g by mouth four (4) times daily. No current facility-administered medications for this visit. LAB DATA REVIEWED:     Lab Results   Component Value Date/Time    WBC 6.1 01/02/2020 03:24 AM    HGB 13.4 01/02/2020 03:24 AM    PLATELET 789 (L) 85/75/9785 03:24 AM     Lab Results   Component Value Date/Time    Sodium 142 01/02/2020 03:24 AM    Potassium 3.4 (L) 01/02/2020 03:24 AM    Chloride 111 (H) 01/02/2020 03:24 AM    CO2 25 01/02/2020 03:24 AM    BUN 8 01/02/2020 03:24 AM    Creatinine 0.76 01/02/2020 03:24 AM    Calcium 8.5 01/02/2020 03:24 AM    Magnesium 2.4 12/31/2019 07:37 AM    Phosphorus 4.0 08/18/2018 04:20 AM      Lab Results   Component Value Date/Time    AST (SGOT) 12 (L) 01/02/2020 03:24 AM    Alk.  phosphatase 106 01/02/2020 03:24 AM    Protein, total 5.7 (L) 01/02/2020 03:24 AM    Albumin 3.0 (L) 01/02/2020 03:24 AM    Globulin 2.7 01/02/2020 03:24 AM     Lab Results   Component Value Date/Time    INR 1.0 12/04/2019 08:21 AM    Prothrombin time 10.6 12/04/2019 08:21 AM    aPTT 27.4 09/29/2017 09:20 AM      Lab Results   Component Value Date/Time    Iron 31 (L) 01/02/2020 03:24 AM    TIBC 245 (L) 01/02/2020 03:24 AM    Iron % saturation 13 (L) 01/02/2020 03:24 AM    Ferritin 122 01/02/2020 03:24 AM           CONTROLLED SUBSTANCES SAFETY ASSESSMENT (IF ON CONTROLLED SUBSTANCES):     Reviewed opioid safety handout:  [x] Yes   [] No  24 hour opioid dose >150mg morphine equivalent/day:  [] Yes   [x] No  Benzodiazepines:  [] Yes   [x] No  Sleep apnea:  [] Yes   [x] No  Urine Toxicology Testing within last 6 months:  [] Yes   [x] No  History of or new aberrant medication taking behaviors:  [] Yes   [x] No  Has Narcan been prescribed [x] Yes   [] No          Total time: 70 minutes  Counseling / coordination time:   > 50% counseling / coordination?:   Consent:  He and/or health care decision maker is aware that that he may receive a bill for this telehealth service, depending on his insurance coverage, and has provided verbal consent to proceed: Yes    CPT Codes 24051-80963 for Established Patients may apply to this Telehealth Visit  Pursuant to the emergency declaration under the [address] [place] [address], [facility] waiver authority and the [employer] and [employer] Act, this Virtual  Visit was conducted, with patient's consent, to reduce the patient's risk of exposure to COVID-19 and provide continuity of care for an established patient. Services were provided through a video synchronous discussion virtually to substitute for in-person clinic visit.

## 2020-05-06 NOTE — PROGRESS NOTES
Palliative Medicine Office Visit  Palliative Medicine Nurse Check In  [phone]    Patient Name: Amena Pitt  YOB: 1955      Date of Office Visit: 5/6/2020  Patient states: \"  \"    From Check In Sheet (scanned in Media):  Has a medical provider talked with you about cardiopulmonary resuscitation (CPR)? [x] Yes   [] No   [] Unable to obtain    Nurse reminder to complete or update ACP FlowSheet:    Is ACP on the Problem List?    [x] Yes    [] No  IF ACP Document is ON FILE; Nurse to place ACP on Problem List     Is there an ACP Note in Chart Review/Note? [x] Yes    [] No   If NO: ALERT PROVIDER          Primary Decision [facility]cas [facility] - [phone]    Secondary Decision Maker: Ridge Cristina III - Child - [phone]    Supplemental (Other) Decision Maker: Quita Brooks Child - [phone]  Advance Care Planning 5/6/2020   Patient's [place] is: Named in scanned ACP document   Primary Decision Maker Name -   Primary Decision Maker Phone Number -   Primary Decision Maker Relationship to Patient -   Confirm Advance Directive Yes, on file   Patient Would Like to Complete Advance Directive -   Does the patient have other document types -         Is there anything that we should know about you as a person in order to provide you the best care possible? Have you been to the ER, urgent care clinic since your last visit? [] Yes   [x] No   [] Unable to obtain    Have you been hospitalized since your last visit? [] Yes   [x] No   [] Unable to obtain    Have you seen or consulted any other health care providers outside of the [address] [place] [address] since your last visit?    [] Yes   [x] No   [] Unable to obtain    Functional status (describe):         Last BM: 5/6/2020     accessed (date): 5/6/2020    Bottle review (for opioid pain medication):  Medication 1:   Date filled:   Directions:   # filled:   # left:   # pills taking per day:  Last dose taken:     Medication 2:   Date filled:   Directions:   # filled:   # left:   # pills taking per day:  Last dose taken:    Medication 3:   Date filled:   Directions:   # filled:   # left:   # pills taking per day:  Last dose taken:    Medication 4:   Date filled:   Directions:   # filled:   # left:   # pills taking per day:  Last dose taken:

## 2020-05-13 ENCOUNTER — HOSPITAL ENCOUNTER (OUTPATIENT)
Dept: CT IMAGING | Age: 65
Discharge: HOME | End: 2020-05-13
Attending: RADIOLOGY
Payer: COMMERCIAL

## 2020-05-13 DIAGNOSIS — C24.0 MALIGNANT NEOPLASM OF BILE DUCTS (HCC): ICD-10-CM

## 2020-05-13 LAB — CREAT BLD-MCNC: 0.6 MG/DL (ref 0.6–1.3)

## 2020-05-13 PROCEDURE — 71260 CT THORAX DX C+: CPT

## 2020-05-13 PROCEDURE — 82565 ASSAY OF CREATININE: CPT

## 2020-05-13 PROCEDURE — 74177 CT ABD & PELVIS W/CONTRAST: CPT

## 2020-05-13 PROCEDURE — 74011636320 HC RX REV CODE- 636/320: Performed by: RADIOLOGY

## 2020-05-13 RX ORDER — SODIUM CHLORIDE 0.9 % (FLUSH) 0.9 %
10 SYRINGE (ML) INJECTION
Status: COMPLETED | OUTPATIENT
Start: 2020-05-13 | End: 2020-05-13

## 2020-05-13 RX ADMIN — IOPAMIDOL 100 ML: 755 INJECTION, SOLUTION INTRAVENOUS at 09:41

## 2020-05-13 RX ADMIN — Medication 10 ML: at 09:41

## 2020-05-13 RX ADMIN — IOHEXOL 40 ML: 240 INJECTION, SOLUTION INTRATHECAL; INTRAVASCULAR; INTRAVENOUS; ORAL at 09:41

## 2020-05-22 ENCOUNTER — VIRTUAL VISIT (OUTPATIENT)
Dept: ONCOLOGY | Age: 65
End: 2020-05-22

## 2020-05-22 DIAGNOSIS — G89.3 CANCER RELATED PAIN: ICD-10-CM

## 2020-05-22 DIAGNOSIS — R11.2 NAUSEA AND VOMITING, INTRACTABILITY OF VOMITING NOT SPECIFIED, UNSPECIFIED VOMITING TYPE: ICD-10-CM

## 2020-05-22 DIAGNOSIS — C22.1 CHOLANGIOCARCINOMA OF BILIARY TRACT (HCC): Primary | ICD-10-CM

## 2020-05-22 NOTE — PROGRESS NOTES
Kenan Dai is a 62 y.o. cauc male here for follow up for:  Chief Complaint   Patient presents with    Cancer     Extrahepatic cholangiocarcinoma:     1. Have you been to the ER, urgent care clinic since your last visit? Hospitalized since your last visit? No    2. Have you seen or consulted any other health care providers outside of the [address] [place] [address] since your last visit? Include any pap smears or colon screening. Procedure Type:   ERCPwith pancreatic stent placement, anastomotic dilation shell 4/1 by Dr Savita Kevin    *Pt sees palliative med  Had bad diarrhea after surgery and was put on Creon which did not help. Then put on Cholextyramine powder 1 scoop 2xdaily which has helped a lot. Was 145 and has  gained 6 lb since.

## 2020-05-22 NOTE — PROGRESS NOTES
[address] [facility] [place], [place]  at [facility] [address], [address] [facility]  [phone]       Follow-up Note      Patient: Xavi So MRN: [id number]  SSN: [id number]    YOB: 1955  Age: 62 y.o. Sex: male          Reason for Visit:   Xavi So is a 62 y.o. male who is seen by synchronous (real-time) audio-video technology for follow up of cholangiocarcinoma      Diagnosis:     1. 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 Treatment:     1. S/P Pancreatoduodenectomy on  10/06/2017  2. Adjuvant monotherapy with Capecitabine - s/p 6 cycles   (12/4/2017 - 05/2018)    Subjective:      Xavi So is a 62 y.o. male with a diagnosis of extrahepatic cholangiocarcinoma. He presented to the ED in August 2017 with obstructive jaundice. He was found to have an obstructive lesion in the dital bile duct. The CT showed marked intrahepatic biliary dilation and common bile duct dilation to the level of the mid common bile duct, which ws markedly narrowed. He underwent a stenting procedure followed by spyglass cholangiogram. The brushings revealed a diagnosis of cholangiocarcinoma. He underwent a Whipple procedure on 10/06/2017. He completed 6 cycles of adjuvant Xeloda. Mr. Antionette Bae continues to have daily episodes of bilious emesis almost daily. PET scan showed increased activity in the soft tissue along the SMA. CT guided biopsy showed local recurrence. He underwent SBRT by Dr. Starr Olguin in April 2020. Pain is controlled with fentanyl. He is gaining some weight lately. Vomiting is less frequent.          Review of Systems:    Constitutional: negative  Eyes: negative  Ears, Nose, Mouth, Throat, and Face: negative  Respiratory: negative  Cardiovascular: negative  Gastrointestinal: occasional emesis  Genitourinary:negative  Integument/Breast: negative  Hematologic/Lymphatic: negative  Musculoskeletal:negative  Neurological: negative      Past Medical History:   Diagnosis Date    Arrhythmia     Previous a.fib, ablation, NSR now; NO LONGER FOLLOWED BY CARDIOLOGIST.  Autoimmune disease ([facility]     SJOGREN'S    Cancer ([facility]     COMMON BILE DUCT, ADENOCARCINOMA    Diabetes ([facility]     Family history of skin cancer     Hypertension     Sepsis ([facility] [address] [facility] 2017    STENTING TO BILE DUCT    Status post chemotherapy     Stroke [facility]) 2008    brain aneurysm - no deficits    Sun-damaged skin     Sunburn, blistering      Past Surgical History:   Procedure Laterality Date    ABDOMEN SURGERY PROC UNLISTED  10/2017    Whippolvin     HX GI      COLONOSCOPY, POLYPS (BENIGN)    HX GI  10/06/2017    [facility]     HX HEART CATHETERIZATION  2005    Ablation     HX ORTHOPAEDIC  2000    Spinal fusion W/ HARDWARE    HX OTHER SURGICAL  11/07/2017    [place] Cath, Dr. Arin Rokc  2017    PORTACATH - then removed    NEUROLOGICAL PROCEDURE UNLISTED  2005    [place] hole washout from cerebral hemorrhage      Family History   Problem Relation Age of Onset    Cancer Father         Breast and Colon    Cancer Mother         LEUKEMIA    No Known Problems Sister     No Known Problems Brother     No Known Problems Sister     Anesth Problems Neg Hx      Social History     Tobacco Use    Smoking status: Never Smoker    Smokeless tobacco: Never Used   Substance Use Topics    Alcohol use: Never     Frequency: Never     Comment: very rare      Prior to Admission medications    Medication Sig Start Date End Date Taking? Authorizing Provider   finasteride (PROSCAR) 5 mg tablet TAKE 1 TABLET BY MOUTH EVERY DAY 4/13/20  Yes Provider, Historical   cholestyramine-aspartame (QUESTRAN LIGHT) 4 gram packet Take 4 g by mouth two (2) times a day.    Yes Provider, Historical   gabapentin (NEURONTIN) 100 mg capsule Take  by mouth two (2) times a day. Not sure of dose but takes 3 pills 2 times a day   Yes Provider, Historical   ramipriL (ALTACE) 10 mg capsule TAKE 1 CAPSULE BY MOUTH EVERY DAY AT NIGHT 3/29/20  Yes Silas Raya III, DO   ondansetron (ZOFRAN ODT) 4 mg disintegrating tablet Take 1 Tab by mouth every eight (8) hours as needed for Nausea. 2/24/20  Yes Silas Raya III, DO   acetaminophen (TYLENOL) 325 mg tablet Take 2 Tabs by mouth every four (4) hours as needed for Pain or Fever (Over the counter medication). 1/2/20  Yes Viridiana Billingsley NP   sucralfate (CARAFATE) 1 gram tablet Take 1 g by mouth four (4) times daily. Yes Provider, Historical   omeprazole (PRILOSEC) 40 mg capsule Take 1 Cap by mouth daily. 11/5/19  Yes Katja Mendoza MD   empagliflozin (JARDIANCE) 25 mg tablet Take 1 Tab by mouth daily. 5/30/19  Yes Anderson Woods MD   sildenafil citrate (VIAGRA) 50 mg tablet Take 1 Tab by mouth as needed (ED). 5/6/19  Yes Silas Raya III, DO   tamsulosin (FLOMAX) 0.4 mg capsule TAKE ONE CAPSULE BY MOUTH EVERY EVENING 2/28/19  Yes Provider, Historical   cyanocobalamin (VITAMIN B12) 1,000 mcg/mL injection INJECT CONTENTS OF ONE VIAL INTRAMUSCULARLY EVERY OTHER WEEK 9/20/18  Yes Silas Raya III, DO   alpha-D-galactosidase (BEANO PO) Take 1 Tab by mouth two (2) times a day. Yes Other, MD Flynn   cetirizine (ZYRTEC) 10 mg tablet Take 10 mg by mouth nightly.    Yes Provider, Historical   Creon 36,000-114,000- 180,000 unit cpDR capsule TAKE 2 3 CAPSULES WITH EACH MEAL AND 1 2 WITH SNACK 4/10/20   Provider, Historical          Allergies   Allergen Reactions    Shellfish Derived Anaphylaxis     Soft shell crabs    Morphine Nausea and Vomiting    Pcn [Penicillins] Other (comments)     Pt stated \"always been told PCN\"  Pt tolerated other cephalosporins in the past           Objective:     General: alert, cooperative, no distress   Mental  status: normal mood, behavior, speech, dress, motor activity, and thought processes, able to follow commands   HENT: NCAT   Neck: no visualized mass   Resp: no respiratory distress   Neuro: no gross deficits   Skin: no discoloration or lesions of concern on visible areas   Psychiatric: normal affect, consistent with stated mood, no evidence of hallucinations       Due to this being a TeleHealth evaluation (During [facility] public health emergency), many elements of the physical examination are unable to be assessed. Evaluation of the following organ systems was limited: Vitals/Constitutional/EENT/Resp/CV/GI//MS/Neuro/Skin/Heme-Lymph-Imm. CT Results (most recent):  Results from Hospital Encounter encounter on 05/13/20   CT ABD PELV W CONT    Narrative EXAM:  CT CHEST W CONT, CT ABD PELV W CONT  INDICATION:   81-year-old with cholangiocarcinoma status post Whipple and status  post radiation therapy to abdominal lymph nodes. COMPARISON: CT guided biopsy to 320, PET scan 1/21/2020, CT abdomen pelvis  12/31/2019. Alan Javier TECHNIQUE:   Multislice helical CT was performed from the thoracic inlet to the pubic  symphysis was performed with 100 CC Isovue intravenous contrast administration. Oral contrast was administered. Contiguous 5 mm axial images were reconstructed  and lung and soft tissue windows were generated. Coronal and sagittal  reformations were generated. CT dose reduction was achieved through the use of a standardized protocol  tailored for this examination and automatic exposure control for dose  modulation. Alan Javier FINDINGS:  CHEST:  Thyroid: Unremarkable. Mediastinum/elli: No mediastinal or hilar lymph nodes. Heart/vessels: Coronary artery calcifications. No pericardial effusion. .  Lungs/Pleura: No suspicious nodule or mass. Minimal intralobular septal  thickening is recommended in an the lingula and medial right middle lobe, not  significantly changed. ABDOMEN:  Liver: Diffuse stenosis with mild focal lesion.  Unchanged pneumobilia. .  Gallbladder/Biliary: Status post Whipple procedure. Unchanged pneumobilia. Manuel Cisneros Spleen: No splenomegaly or suspicious lesion. .  Pancreas: Status post Whipple. Pancreatic duct stent in place. No evidence of  worsening pancreatic duct dilation. Adrenals: Unremarkable. Kidneys: Trace fluid in the area of the left renal hilum is favored to represent  posttreatment changes rather than represent fluid within the collecting system. No hydroureter No right-sided hydronephrosis. No nephrolithiasis bilaterally. Unchanged exophytic left anterior 2.6 cm cyst..  Bladder: Unremarkable. .  Gastrointestinal tract: No bowel obstruction or evidence of focal bowel wall  thickening. .  Peritoneum: The degree of fat stranding/trace fluid in the abdomen appears  improved in the area of the pancreatic body extending toward the teri hepatis. There is slightly increasing fluid and stranding in the left perirenal tissues. Retroperitoneum: Ill-defined periaortic soft tissue in the upper retroperitoneum  at the level of both the celiac artery and SMA. Measurements are somewhat  challenging due to the poorly defined nature of the tissue. Right periaortic  soft tissue density, image 2-68 is increased in greatest thickness, up to 2.5,  previously approximately 0.8 cm. Left periaortic soft tissue appears similar to  mildly increased, measuring up to 2.2 cm in length at the level of the SMA  origin. There is new, moderate compression of the proximal 1.8 cm segment of the  SMA. Vascular: Scattered atherosclerotic disease of the aorta without aneurysmal  dilation. Mildly dilated portal vein. .  Reproductive System: Mild prostatomegaly. .  Bones and soft tissues: No evidence of an aggressive osseous lesion. Chronic  left posterior rib deformities. Subcutaneous tissues are unremarkable. .        Impression IMPRESSION:  1.  Increasing ill-defined periaortic retroperitoneal soft tissue causing new  moderate compression at the origin of the SMA. 2. Increasing fat stranding and trace fluid in the left mid abdomen is likely  related to radiation therapy. 3. Status post Whipple with pancreatic duct stent in place and unchanged  pneumobilia. I personally reviewed the images. Para aortic mass. Assessment:     1. Extrahepatic cholangiocarcinoma:    T3 N1 (1 of 12 LN +ve)  Invasion into pancreas  R0 resection    > S/P Pancreatoduodenectomy on  10/06/2017    Completed adjuvant treatment with single agent Capecitabine - s/p 6 cycles (12/4/2017 - 05/2018). Now with local recurrence in the para-aortic soft tissue     CT guided biopsy 2/3/2020  Of para -aortic soft tissue mass - + for adenocarcinoma  S/P SBRT     Repeat CT - ? Progression vs radiation effect    Will repeat CT in 8 weeks  Tumor marker level       2. Cancer related pain    He does not like taking opioids every few hours and therefore has been taking Tramadol. This is no longer working. We have recommended that he start Fentanyl 12.5 mcg every 3 days.  reviewed. Discussed proper storage of narcotics. Keep in a safe place away from children, other adults and pets. 3. Nausea and Vomiting    Better after bile duct stenting      Plan:       > Follow-up in 2-3 months  > Rx for Fentanyl patch 25 mg mcg for 30 days  > CA 19-9  > Repeat CT in 2-3 months      Signed by: Aristeo Marquez MD                     May 22, 2020      CC. Johny Wyatt MD  CC. Romario Aguillon MD  CC. Alcides Ayala MD  CC. Ginger Horan MD  CC. Magdaleno Guerrero MD      I was in the office while conducting this encounter. The patient was at his home. Consent:  He and/or his healthcare decision maker is aware that this patient-initiated Telehealth encounter is a billable service, with coverage as determined by his insurance carrier.  He is aware that he may receive a bill and has provided verbal consent to proceed: Yes    Pursuant to the emergency declaration under the [address] Emergencies Act, 1135 waiver authority and the Coronavirus Preparedness and Response Supplemental Appropriations Act, this Virtual  Visit was conducted, with patient's (and/or legal guardian's) consent, to reduce the patient's risk of exposure to COVID-19 and provide necessary medical care. Services were provided through a video synchronous discussion virtually to substitute for in-person visit.

## 2020-05-26 DIAGNOSIS — C24.9 CHOLANGIOCARCINOMA DETERMINED BY BIOPSY OF BILIARY TRACT (HCC): ICD-10-CM

## 2020-05-26 RX ORDER — FENTANYL 25 UG/1
1 PATCH TRANSDERMAL
Qty: 10 PATCH | Refills: 0 | Status: SHIPPED | OUTPATIENT
Start: 2020-05-26 | End: 2020-06-23 | Stop reason: SDUPTHER

## 2020-05-26 NOTE — TELEPHONE ENCOUNTER
Triage for Controlled Substance Refill Request    Pain Diagnosis: _Cholangiocarcinoma determined by biopsy of biliary tract    Last Outpatient Visit: _5/6/2020    Next Outpatient Visit: _4/9/3338    Reason for refill needed outside of office visit?   -Appointment not scheduled prior to need for scheduled refill    [facility] [id number]  [facility]) 25 mcg/hr PATCH  Dose and directions:1 Patch by TransDERmal route every seventy-two (72)  Number dispensed:10  Date filled ( or Pharmacy):4/17/2020  #left:1     reviewed: _yes     Date of Urine Drug Screen:  _n/a    Opioid Safety Handout given:  _yes     Appropriate for refill:  _yes     Action:  _ please refill

## 2020-06-02 ENCOUNTER — HOSPITAL ENCOUNTER (OUTPATIENT)
Dept: PET IMAGING | Age: 65
Discharge: HOME | End: 2020-06-02
Attending: INTERNAL MEDICINE
Payer: COMMERCIAL

## 2020-06-02 VITALS — WEIGHT: 150 LBS | HEIGHT: 68 IN | BODY MASS INDEX: 22.73 KG/M2

## 2020-06-02 DIAGNOSIS — C22.1 CHOLANGIOCARCINOMA OF BILIARY TRACT (HCC): ICD-10-CM

## 2020-06-02 PROCEDURE — A9552 F18 FDG: HCPCS

## 2020-06-02 RX ORDER — SODIUM CHLORIDE 0.9 % (FLUSH) 0.9 %
10 SYRINGE (ML) INJECTION
Status: COMPLETED | OUTPATIENT
Start: 2020-06-02 | End: 2020-06-02

## 2020-06-02 RX ADMIN — Medication 10 ML: at 09:56

## 2020-06-04 LAB — CANCER AG19-9 SERPL-ACNC: 10 U/ML (ref 0–35)

## 2020-06-19 ENCOUNTER — DOCUMENTATION ONLY (OUTPATIENT)
Dept: OTHER | Age: 65
End: 2020-06-19

## 2020-06-19 NOTE — PROGRESS NOTES
Chart Review Only        Patient with cholangiocarcinoma s/p pylorus-preserving Whipple 10/2017, HTN, DM, BPH and recurrent pancreatitis. Closed CM with patient in active treatment. * Noted PET scan awaiting results. * Palliative Care following for symptom control. Noted awaiting results of PET scan. My Chart note 6/19.      - image review shows tumor growth from SUV 4 to SUV 6. No bone mets. * CM will continue to monitor for CM needs/  Chart Review again in one week for any POC or tumor board review. Chart Review:   Virtual OV with PCP 4/6    Assessment/Plan:     1.  Dm, type 2--only on jardiance now. Sugars 100-130. Stopped glucophage to see if that would help with his GI complaints. Unfortunately, it did not improve things. 2. bph--continue flomax. He hopes to see urology soon as well  3.  htn--well controlled with altace  4. Recurrent cholangiocarcinoma--initially had whipple procedure, then feb 2020 had radiation to area for recurrence  5. Recurrent pancreatitis--had 2nd pancreatic stent placed by dr Jayne Martines on April 1, and has plans to have 3rd placed in aug.    6.  Recurrent n/v/d--thus far, has not had any improvement. Being evaluated now for pancreatic insufficiency.   7.  pafib--sp ablation     rtc 3 months

## 2020-06-23 DIAGNOSIS — K85.90 RECURRENT PANCREATITIS: ICD-10-CM

## 2020-06-23 DIAGNOSIS — C24.9 CHOLANGIOCARCINOMA DETERMINED BY BIOPSY OF BILIARY TRACT (HCC): ICD-10-CM

## 2020-06-23 RX ORDER — FENTANYL 25 UG/1
1 PATCH TRANSDERMAL
Qty: 10 PATCH | Refills: 0 | Status: SHIPPED | OUTPATIENT
Start: 2020-06-23 | End: 2020-07-01 | Stop reason: SDUPTHER

## 2020-06-23 RX ORDER — TRAMADOL HYDROCHLORIDE 50 MG/1
50 TABLET ORAL
Qty: 60 TAB | Refills: 3 | Status: SHIPPED | OUTPATIENT
Start: 2020-06-23 | End: 2020-07-01 | Stop reason: SDUPTHER

## 2020-06-23 NOTE — TELEPHONE ENCOUNTER
Triage for Controlled Substance Refill Request    Pain Diagnosis: _Cholangiocarcinoma determined by biopsy of biliary tract     Last Outpatient Visit: _5/6/2020    Next Outpatient Visit: _7/5/0556    Reason for refill needed outside of office visit? -Appointment not scheduled prior to need for scheduled refill      [id number] [place], [place] - [address]        Medication:Tramadol 50 mg   Dose and directions:1 tab every 6 hours   Number dispensed:60   Date filled ( or Pharmacy):4/19/2020  #left:4    Medication: Fentanyl 25 mcg patch   Dose and directions: 1 patch every 72 hours.   Number dispensed: 10   Date Filled: 5/26/2020  #left: 1         reviewed: _yes     Date of Urine Drug Screen:  _n/a    Opioid Safety Handout given:  _yes     Appropriate for refill:  _yes     Action:  _ please refill

## 2020-06-23 NOTE — TELEPHONE ENCOUNTER
----- Message from Breann Cristina sent at 6/22/2020  7:25 AM EDT -----  Regarding: Prescription Question  Contact: [phone]  Ton Turner has been experiencing an increase in pain and requiring Tramadol 50mg x3 per day in addition to the Fentanyl patch. He had a PET Scan on 6/2/20 and we have not heard the results. We asked Dr. Yanelis Lovett to let us know the results and he said he is waiting to hear back from Dr. Mushtaq Agustin and Dr. Israel Fowler.   Could you [facility]?  Thank you,  Rohit & Shahriar Beaver

## 2020-06-25 DIAGNOSIS — C24.9 CHOLANGIOCARCINOMA DETERMINED BY BIOPSY OF BILIARY TRACT (HCC): ICD-10-CM

## 2020-06-25 RX ORDER — FENTANYL 25 UG/1
1 PATCH TRANSDERMAL
Qty: 10 PATCH | Refills: 0 | Status: CANCELLED | OUTPATIENT
Start: 2020-06-25 | End: 2020-07-25

## 2020-06-25 RX ORDER — CYANOCOBALAMIN 1000 UG/ML
INJECTION, SOLUTION INTRAMUSCULAR; SUBCUTANEOUS
Qty: 6 ML | Refills: 6
Start: 2020-06-25 | End: 2020-07-15 | Stop reason: SDUPTHER

## 2020-06-29 ENCOUNTER — TELEPHONE (OUTPATIENT)
Dept: PALLATIVE CARE | Age: 65
End: 2020-06-29

## 2020-06-29 NOTE — TELEPHONE ENCOUNTER
Calling patient to advise of Virtual Visit with Dr. Karen Mcginnis on 7/1/2020 at 9:30am with nurse calling to start visit by 8:15am.  Pt confirmed.

## 2020-07-01 ENCOUNTER — OFFICE VISIT (OUTPATIENT)
Dept: URGENT CARE | Age: 65
End: 2020-07-01

## 2020-07-01 ENCOUNTER — VIRTUAL VISIT (OUTPATIENT)
Dept: PALLATIVE CARE | Age: 65
End: 2020-07-01

## 2020-07-01 VITALS — HEART RATE: 72 BPM | RESPIRATION RATE: 16 BRPM | OXYGEN SATURATION: 96 % | TEMPERATURE: 98.3 F

## 2020-07-01 DIAGNOSIS — C24.9 CHOLANGIOCARCINOMA DETERMINED BY BIOPSY OF BILIARY TRACT (HCC): ICD-10-CM

## 2020-07-01 DIAGNOSIS — K59.1 FUNCTIONAL DIARRHEA: ICD-10-CM

## 2020-07-01 DIAGNOSIS — K85.90 RECURRENT PANCREATITIS: ICD-10-CM

## 2020-07-01 DIAGNOSIS — R10.10 PAIN OF UPPER ABDOMEN: Primary | ICD-10-CM

## 2020-07-01 DIAGNOSIS — Z20.822 ENCOUNTER FOR LABORATORY TESTING FOR COVID-19 VIRUS: Primary | ICD-10-CM

## 2020-07-01 RX ORDER — TRAMADOL HYDROCHLORIDE 50 MG/1
50 TABLET ORAL
Qty: 60 TAB | Refills: 3 | Status: SHIPPED | OUTPATIENT
Start: 2020-07-23 | End: 2020-08-31 | Stop reason: SDUPTHER

## 2020-07-01 RX ORDER — FENTANYL 25 UG/1
1 PATCH TRANSDERMAL
Qty: 10 PATCH | Refills: 0 | Status: SHIPPED | OUTPATIENT
Start: 2020-07-23 | End: 2020-07-27 | Stop reason: SDUPTHER

## 2020-07-01 NOTE — PATIENT INSTRUCTIONS
Dear Hyun Antunez , It was a pleasure seeing you today in your home along with your wife virtually We will see you again in 2 months If labs or imaging tests have been ordered for you today, please call the office  at [phone] 48 hours after completion to obtain the results. Your stated goal:  
- better pain management Your described symptoms were: Fatigue: 6 Drowsiness: 2 Depression: 0 Pain: 8 Anxiety: 0 Nausea: 8 Anorexia: 3 Dyspnea: 0 Best Well-Bein Constipation: No  
Other Problem (Comment): 0 This is the plan we talked about: 1. Mid back pain and new mid upper abdominal pain - The back pain is much better with SBRT to the back which you completed 2 weeks ago. 
-We tried to wean you off of the fentanyl to see how you did but the pain returned and you did not do well. We decided to go back on fentanyl 25 mcg every 72 hours. I have provided you with a refill. 
-You have now been using tramadol 50 mg 3 times a day on a regular basis for the new mid upper abdominal pain. -They could also be gastric reflux component to this and therefore please try taking Carafate 3 times a day to see if this helps. Continue Prilosec 40 mg daily. 2. Functional diarrhea 
-You continue to have loose stools. You are now on pancreatic enzymes and cholestyramine. Let us see how this helps. - We talked about drinking coconut water and lemonade to help with electrolyte replacement. 3.  Nausea and vomiting 
-We have now identified that the nausea and vomiting is very much related to regurgitation of undigested food back into your stomach. We think this is because of the anatomical disturbance that the Whipple procedure has caused and that is why her medications are not very helpful. Zofran still helps with the nausea but you always feel relieved only after vomiting. 
-You are getting a pancreatic stent placed next week 4. We completed an AMD naming your wife as Nikko. 5.  Next steps 
-You are meeting with Dr. Aiyana Kevin in August to discuss reversal of Whipple procedure. You want to hold off until the COVID 19 curve flattens in [place] before the elective surgery but convinced that the surgery will be the answer to your vomiting problem. This is what you have shared with us about Advance Care Planning: 
 
  Primary Decision Maker: Tomas Spears Spouse - [phone] Secondary Decision Maker: Loan Escalante - Child - [phone] Supplemental (Other) Decision Maker: Roberto Cristina - Child - [phone] Advance Care Planning 5/6/2020 Patient's Healthcare Decision Maker is: Named in scanned ACP document Primary Decision Maker Name -  
Primary Decision Maker Phone Number -  
Primary Decision Maker Relationship to Patient -  
Confirm Advance Directive Yes, on file Patient Would Like to Complete Advance Directive - Does the patient have other document types - The Palliative Medicine Team is here to support you and your family.   
 
 
Sincerely, 
 
 
Kristin Cat MD and the Palliative Medicine Team

## 2020-07-01 NOTE — PROGRESS NOTES
Palliative Medicine Office Visit  Palliative Medicine Nurse Check In  [phone]    Patient Name: Evan Rivera  YOB: 1955      Date of Office Visit:     Patient states: \"  \"    From Check In Sheet (scanned in Media):  Has a medical provider talked with you about cardiopulmonary resuscitation (CPR)? [] Yes   [] No   [] Unable to obtain    Nurse reminder to complete or update ACP FlowSheet:    Is ACP on the Problem List?    [] Yes    [] No  IF ACP Document is ON FILE; Nurse to place ACP on Problem List     Is there an ACP Note in Chart Review/Note? [] Yes    [] No   If NO: [address] Planning 5/6/2020   Patient's Healthcare Decision Maker is: Named in scanned ACP document   Primary Decision Maker Name -   Primary Decision Maker Phone Number -   Primary Decision Maker Relationship to Patient -   Confirm Advance Directive Yes, on file   Patient Would Like to Complete Advance Directive -   Does the patient have other document types -       Is there anything that we should know about you as a person in order to provide you the best care possible? Have you been to the ER, urgent care clinic since your last visit? [] Yes   [x] No   [] Unable to obtain    Have you been hospitalized since your last visit? [] Yes   [x] No   [] Unable to obtain    Have you seen or consulted any other health care providers outside of the [address] [place] [address] since your last visit?    [] Yes   [x] No   [] Unable to obtain    Functional status (describe):       Last BM: 7/1/2020     accessed (date): 7/1/2020    Bottle review (for opioid pain medication):  Medication 1:  Tramadol 50 mg   Date filled: 6/23/2020  Directions: 1 tab by mouth every 6 hours  # filled: 60   # left:   # pills taking per day:  Last dose taken:    Medication 2: Fentanyl 25 mcg   Date filled: 6/23/2020  Directions: 1 patch every 72 hours   # filled: 10  # left:   # pills taking per day:1 patch every 72 hours   Last dose taken:

## 2020-07-01 NOTE — PROGRESS NOTES
Palliative Medicine Outpatient Services  [facility]: [phone])    Patient Name: Lavonne Fair  YOB: 1955    Date of Current Visit: 07/01/20  Location of Current Visit:    [] [facility] Office  [] [facility] Office  [] [facility] Office  [] Home  [x] Other: Virtual synchronous A/V visit    Date of Initial Visit: 4/6/2020  Referral from: Dr. Shital Pimentel  Primary Care Physician: Beryle Gee, DO      SUMMARY:   Lavonne Fair is a 62y.o. 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 Shital Pimentel for management of symptoms and psychosocial support. 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 This is second marriage for both of them. Current treatment-completed RT to the para-aortic mass, pursuing diagnostics with GI physician Dr. Junie Cowden for gastroparesis and pancreatic enzymes, has had biliary stents replaced twice, third 1 scheduled for August 2020. Patient wants reversal of his Whipple after that. He follows closely with Dr. Santana Luna with general surgery. PALLIATIVE DIAGNOSES:       ICD-10-CM ICD-9-CM    1. Pain of upper abdomen R10.10 789.09    2. Cholangiocarcinoma determined by biopsy of biliary tract (HCC) C24.9 156.9 fentaNYL (DURAGESIC) 25 mcg/hr PATCH   3. Recurrent pancreatitis K85.90 577.1 traMADoL (ULTRAM) 50 mg tablet   4.  Functional diarrhea K59.1 564.5           PLAN:   Patient Instructions     Dear Lavonne Fair ,    It was a pleasure seeing you today in your home along with your wife virtually    We will see you again in 2 months    If labs or imaging tests have been ordered for you today, please call the office  at [phone] 48 hours after completion to obtain the results. Your stated goal:   - better pain management    Your described symptoms were: Fatigue: 6 Drowsiness: 2   Depression: 0 Pain: 8   Anxiety: 0 Nausea: 8   Anorexia: 3 Dyspnea: 0   Best Well-Bein Constipation: No   Other Problem (Comment): 0       This is the plan we talked about:    1. Mid back pain and new mid upper abdominal pain  - The back pain is much better with SBRT to the back which you completed 2 weeks ago.  -We tried to wean you off of the fentanyl to see how you did but the pain returned and you did not do well. We decided to go back on fentanyl 25 mcg every 72 hours. I have provided you with a refill.  -You have now been using tramadol 50 mg 3 times a day on a regular basis for the new mid upper abdominal pain. -They could also be gastric reflux component to this and therefore please try taking Carafate 3 times a day to see if this helps. Continue Prilosec 40 mg daily. 2. Functional diarrhea  -You continue to have loose stools. You are now on pancreatic enzymes and cholestyramine. Let us see how this helps. - We talked about drinking coconut water and lemonade to help with electrolyte replacement. 3.  Nausea and vomiting  -We have now identified that the nausea and vomiting is very much related to regurgitation of undigested food back into your stomach. We think this is because of the anatomical disturbance that the Whipple procedure has caused and that is why her medications are not very helpful. Zofran still helps with the nausea but you always feel relieved only after vomiting.  -You are getting a pancreatic stent placed next week    4. We completed an AMD naming your wife as mPOA. 5.  Next steps  -You are meeting with Dr. Asad Vicente in August to discuss reversal of Whipple procedure. You want to hold off until the COVID 19 curve flattens in [place] before the elective surgery but convinced that the surgery will be the answer to your vomiting problem. This is what you have shared with us about Advance Care Planning:      Primary Decision Maker: Eri Padgett Spouse - [phone]    Secondary Decision Maker: Ridge Cristina III - Child - [phone]    Supplemental (Other) Decision Maker: Mark Negron Child - [phone]  Advance Care Planning 5/6/2020   Patient's [place] is: Named in scanned ACP document   Primary Decision Maker Name -   Primary Decision Maker Phone Number -   Primary Decision Maker Relationship to Patient -   Confirm Advance Directive Yes, on file   Patient Would Like to Complete Advance Directive -   Does the patient have other document types -           The Palliative Medicine Team is here to support you and your family.        Sincerely,      Gabriele Mcclendon MD and the Palliative Medicine Team       GOALS OF CARE / TREATMENT PREFERENCES:   [====Goals of Care====]  GOALS OF CARE:  Patient / health care proxy stated goals: See Patient Instructions / Summary    TREATMENT PREFERENCES:   Code Status:  [x] Attempt Resuscitation       [] Do Not Attempt Resuscitation    Advance Care Planning:  [x] [facility] Interdisciplinary Team has updated the ACP Navigator with Decision Maker and Patient Capacity      Primary Decision [facility]inky [facility] [phone]    Secondary Decision Maker: Ridge Cristina III - Child - [phone]    Supplemental (Other) Decision Maker: Mark Negron Child - [phone]  Advance Care Planning 5/6/2020   Patient's Healthcare Decision Maker is: Named in scanned ACP document   Primary Decision Maker Name -   Primary Decision [address] Phone Number -   Primary Decision Maker Relationship to Patient -   Confirm Advance Directive Yes, on file   Patient Would Like to Complete Advance Directive -   Does the patient have other document types -       Other:  (If patient appropriate for POST, consider using PALLPOST smart phrase here)    The palliative care team has discussed with patient / health care proxy about goals of care / treatment preferences for patient.  [====Goals of Care====]     PRESCRIPTIONS GIVEN:     No orders of the defined types were placed in this encounter. FOLLOW UP:     Future Appointments   Date Time Provider Sloane Roach   7/15/2020  9:00 AM [facility]   7/27/2020 11:00 AM NIKI Longssica   8/6/2020  9:00 AM Reed Romano  [address] IN CARE:   Patient Care Team:  Dinora Cox DO as PCP - General (Internal Medicine)  Dinora Cox DO as PCP - [facility] Empaneled Provider  Reed Romano MD (General Surgery)  Sharda Flanagan MD (Gastroenterology)  Edward Oh MD (Gastroenterology)  Geoff Melendez MD (Hematology and Oncology)       HISTORY:   Reviewed patient-completed ESAS and advance care planning form. Reviewed patient record in prescription monitoring program.    CHIEF COMPLAINT: No chief complaint on file. HPI/SUBJECTIVE:    The patient is: [x] Verbal / [] Nonverbal     Patient seen virtually at home. He c/o watery stools, feeling tired after. Abdominal and back pain increased with the decrease in Fentanyl and so we decided to go back on his previous dose. He is having new  Epigastric pain that started about 2 weeks ago and so he is taking tramadol 50 mg 3 times a day on a regular basis. This is helping with his pain. We agreed on trying some Carafate to see if this is gastric reflux related. He is getting a pancreatic stent next week.      -------    Patient here with his wife. Both are very good historians. Mid upper back pain-much improved since radiation to the periaortic mass.   He struggled with pain for several months before it was identified as cancer recurrence. He was started on fentanyl 25 mcg patch which he wants to wean off of. He has made upper and mid zone abdominal pain which comes and goes. He has not noticed any specific pattern to the pain but usually the pain comes on before vomiting or relieves without vomiting. Sometimes, he vomits food that he ate about 12 to 14 hours ago. No constipation. He has 2 liquid bowel movements every day. He is unable to tolerate eggs or honey. He is getting tests done to evaluate his pancreatic enzymes. He has very good support through his wife. Clinical Pain Assessment (nonverbal scale for nonverbal patients):   [++++ Clinical Pain Assessment++++]  [++++Pain Severity++++]: Pain: 8  [++++Pain Character++++]: cramping  [++++Pain Duration++++]: months  [++++Pain Effect++++]: functional   [++++Pain Factors++++]: none in particular  [++++Pain Frequency++++]: on and off  [++++Pain Location++++]: upper abdomen and mid back  [++++ Clinical Pain Assessment++++]       FUNCTIONAL ASSESSMENT:     Palliative Performance Scale (PPS):  PPS: 70       PSYCHOSOCIAL/SPIRITUAL SCREENING:     Any spiritual / Faith concerns:  [] Yes /  [x] No    Caregiver Burnout:  [] Yes /  [x] No /  [] No Caregiver Present      Anticipatory grief assessment:   [x] Normal  / [] Maladaptive       ESAS Anxiety: Anxiety: 0    ESAS Depression: Depression: 0       REVIEW OF SYSTEMS:     The following systems were [x] reviewed / [] unable to be reviewed  Systems: constitutional, ears/nose/mouth/throat, respiratory, gastrointestinal, genitourinary, musculoskeletal, integumentary, neurologic, psychiatric, endocrine. Positive findings noted below.   Modified ESAS Completed by: provider   Fatigue: 6 Drowsiness: 2   Depression: 0 Pain: 8   Anxiety: 0 Nausea: 8   Anorexia: 3 Dyspnea: 0   Best Well-Bein Constipation: No   Other Problem (Comment): 0          PHYSICAL EXAM:     Wt Readings from Last 3 Encounters:   20 150 lb (68 kg)   20 146 lb (66.2 kg)   04/06/20 151 lb (68.5 kg)     There were no vitals taken for this visit. Last bowel movement: See Nursing Note    Constitutional    [x] Appears well-developed and well-nourished in no apparent distress    [] Abnormal:  Mental status  [x] Alert and awake  [x] Oriented to person/place/time  [x] Able to follow commands  [] Abnormal:   Eyes  [x] EOM normal   [x] Sclera normal   [x] No visible ocular discharge  [] Abnormal:   HENT  [x] Normocephalic, atraumatic  [x] Mouth/Throat: Moist mucous membranes   [x] External Ears normal  [] Abnormal:  Neck  [x] No visualized mass  [] Abnormal:  Pulmonary/Chest   [x] Respiratory effort normal  [x] No visualized signs of difficulty breathing or respiratory distress  [] Abnormal:  Musculoskeletal  [x] Normal gait with no signs of ataxia  [x] Normal range of motion of neck  [] Abnormal:  Neurological:   [x] No facial asymmetry (Cranial nerve 7 motor function)  [x] No gaze palsy  [] Abnormal:   Skin  [x] No significant exanthematous lesions or discoloration noted on facial skin  [] Abnormal:                                  Psychiatric  [x] Normal affect  [x] No hallucinations  [] Abnormal:    Other pertinent observable physical exam findings:    Due to this being a TeleHealth evaluation, many elements of the physical examination are unable to be assessed. HISTORY:     Past Medical History:   Diagnosis Date    Arrhythmia     Previous a.fib, ablation, NSR now; NO LONGER FOLLOWED BY CARDIOLOGIST.     Autoimmune disease ([address]     SJOGREN'S    Cancer ([address]     COMMON BILE DUCT, ADENOCARCINOMA    Diabetes ([address]     Family history of skin cancer     Hypertension     Sepsis ([address] 2017    STENTING TO BILE DUCT    Status post chemotherapy     Stroke [facility]) 2008    brain aneurysm - no deficits    Sun-damaged skin     Sunburn, blistering       Past Surgical History:   Procedure Laterality Date    ABDOMEN SURGERY PROC UNLISTED  10/2017    Viktor     HX GI      COLONOSCOPY, POLYPS (BENIGN)    HX GI  10/06/2017    [facility]     [address]  2005    Ablation     HX ORTHOPAEDIC  2000    Spinal fusion W/ HARDWARE    HX OTHER SURGICAL  11/07/2017    Israel Jamison, Dr. Praveen Lauren  2017    PORTACATH - then removed    NEUROLOGICAL PROCEDURE UNLISTED  2005    Ting hole washout from cerebral hemorrhage      Family History   Problem Relation Age of Onset    Cancer Father         Breast and Colon    Cancer Mother         LEUKEMIA    No Known Problems Sister     No Known Problems Brother     No Known Problems Sister     Anesth Problems Neg Hx       History reviewed, no pertinent family history. Social History     Tobacco Use    Smoking status: Never Smoker    Smokeless tobacco: Never Used   Substance Use Topics    Alcohol use: Never     Frequency: Never     Comment: very rare     Allergies   Allergen Reactions    Shellfish Derived Anaphylaxis     Soft shell crabs    Morphine Nausea and Vomiting    Pcn [Penicillins] Other (comments)     Pt stated \"always been told PCN\"  Pt tolerated other cephalosporins in the past      Current Outpatient Medications   Medication Sig    cyanocobalamin (VITAMIN B12) 1,000 mcg/mL injection INJECT CONTENTS OF ONE VIAL INTRAMUSCULARLY EVERY OTHER WEEK    traMADoL (ULTRAM) 50 mg tablet Take 1 Tab by mouth every six (6) hours as needed for Pain for up to 30 days. Max Daily Amount: 200 mg.    fentaNYL (DURAGESIC) 25 mcg/hr PATCH 1 Patch by TransDERmal route every seventy-two (72) hours for 30 days. Max Daily Amount: 1 Patch.  Jardiance 25 mg tablet TAKE 1 TABLET BY MOUTH EVERY DAY    Creon 36,000-114,000- 180,000 unit cpDR capsule TAKE 2 3 CAPSULES WITH EACH MEAL AND 1 2 WITH SNACK    finasteride (PROSCAR) 5 mg tablet TAKE 1 TABLET BY MOUTH EVERY DAY    cholestyramine-aspartame (QUESTRAN LIGHT) 4 gram packet Take 4 g by mouth two (2) times a day.     gabapentin (NEURONTIN) 100 mg capsule Take  by mouth two (2) times a day. Not sure of dose but takes 3 pills 2 times a day    ramipriL (ALTACE) 10 mg capsule TAKE 1 CAPSULE BY MOUTH EVERY DAY AT NIGHT    ondansetron (ZOFRAN ODT) 4 mg disintegrating tablet Take 1 Tab by mouth every eight (8) hours as needed for Nausea.  acetaminophen (TYLENOL) 325 mg tablet Take 2 Tabs by mouth every four (4) hours as needed for Pain or Fever (Over the counter medication).  sucralfate (CARAFATE) 1 gram tablet Take 1 g by mouth four (4) times daily.  omeprazole (PRILOSEC) 40 mg capsule Take 1 Cap by mouth daily.  sildenafil citrate (VIAGRA) 50 mg tablet Take 1 Tab by mouth as needed (ED).  tamsulosin (FLOMAX) 0.4 mg capsule TAKE ONE CAPSULE BY MOUTH EVERY EVENING    alpha-D-galactosidase (BEANO PO) Take 1 Tab by mouth two (2) times a day.  cetirizine (ZYRTEC) 10 mg tablet Take 10 mg by mouth nightly. No current facility-administered medications for this visit. LAB DATA REVIEWED:     Lab Results   Component Value Date/Time    WBC 6.1 01/02/2020 03:24 AM    HGB 13.4 01/02/2020 03:24 AM    PLATELET 452 (L) 76/00/6567 03:24 AM     Lab Results   Component Value Date/Time    Sodium 142 01/02/2020 03:24 AM    Potassium 3.4 (L) 01/02/2020 03:24 AM    Chloride 111 (H) 01/02/2020 03:24 AM    CO2 25 01/02/2020 03:24 AM    BUN 8 01/02/2020 03:24 AM    Creatinine 0.76 01/02/2020 03:24 AM    Calcium 8.5 01/02/2020 03:24 AM    Magnesium 2.4 12/31/2019 07:37 AM    Phosphorus 4.0 08/18/2018 04:20 AM      Lab Results   Component Value Date/Time    Alk.  phosphatase 106 01/02/2020 03:24 AM    Protein, total 5.7 (L) 01/02/2020 03:24 AM    Albumin 3.0 (L) 01/02/2020 03:24 AM    Globulin 2.7 01/02/2020 03:24 AM     Lab Results   Component Value Date/Time    INR 1.0 12/04/2019 08:21 AM    Prothrombin time 10.6 12/04/2019 08:21 AM    aPTT 27.4 09/29/2017 09:20 AM      Lab Results   Component Value Date/Time    Iron 31 (L) 01/02/2020 03:24 AM    TIBC 245 (L) 01/02/2020 03:24 AM    Iron % saturation 13 (L) 01/02/2020 03:24 AM    Ferritin 122 01/02/2020 03:24 AM           CONTROLLED SUBSTANCES SAFETY ASSESSMENT (IF ON CONTROLLED SUBSTANCES):     Reviewed opioid safety handout:  [x] Yes   [] No  24 hour opioid dose >150mg morphine equivalent/day:  [] Yes   [x] No  Benzodiazepines:  [] Yes   [x] No  Sleep apnea:  [] Yes   [x] No  Urine Toxicology Testing within last 6 months:  [] Yes   [x] No  History of or new aberrant medication taking behaviors:  [] Yes   [x] No  Has Narcan been prescribed [x] Yes   [] No          Total time: 70 minutes  Counseling / coordination time:   > 50% counseling / coordination?:   Consent:  He and/or health care decision maker is aware that that he may receive a bill for this telehealth service, depending on his insurance coverage, and has provided verbal consent to proceed: Yes    CPT Codes 26801-89070 for Established Patients may apply to this Telehealth Visit  Pursuant to the emergency declaration under the [facility], [facility]5 waiver authority and the [employer] and [employer] Act, this Virtual  Visit was conducted, with patient's consent, to reduce the patient's risk of exposure to COVID-19 and provide continuity of care for an established patient. Services were provided through a video synchronous discussion virtually to substitute for in-person clinic visit.

## 2020-07-06 ENCOUNTER — HOSPITAL ENCOUNTER (OUTPATIENT)
Age: 65
Setting detail: OUTPATIENT SURGERY
Discharge: HOME | End: 2020-07-06
Attending: INTERNAL MEDICINE | Admitting: INTERNAL MEDICINE
Payer: COMMERCIAL

## 2020-07-06 ENCOUNTER — APPOINTMENT (OUTPATIENT)
Dept: GENERAL RADIOLOGY | Age: 65
End: 2020-07-06
Attending: INTERNAL MEDICINE
Payer: COMMERCIAL

## 2020-07-06 ENCOUNTER — ANESTHESIA (OUTPATIENT)
Dept: ENDOSCOPY | Age: 65
End: 2020-07-06
Payer: COMMERCIAL

## 2020-07-06 ENCOUNTER — ANESTHESIA EVENT (OUTPATIENT)
Dept: ENDOSCOPY | Age: 65
End: 2020-07-06
Payer: COMMERCIAL

## 2020-07-06 VITALS
DIASTOLIC BLOOD PRESSURE: 67 MMHG | OXYGEN SATURATION: 96 % | RESPIRATION RATE: 14 BRPM | SYSTOLIC BLOOD PRESSURE: 108 MMHG | HEART RATE: 63 BPM | WEIGHT: 150 LBS | TEMPERATURE: 97.5 F | BODY MASS INDEX: 22.73 KG/M2 | HEIGHT: 68 IN

## 2020-07-06 LAB — SARS-COV-2, NAA: NOT DETECTED

## 2020-07-06 PROCEDURE — 76040000009: Performed by: INTERNAL MEDICINE

## 2020-07-06 PROCEDURE — 74011250637 HC RX REV CODE- 250/637: Performed by: INTERNAL MEDICINE

## 2020-07-06 PROCEDURE — 77030008684 HC TU ET CUF COVD -B: Performed by: ANESTHESIOLOGY

## 2020-07-06 PROCEDURE — 74011250636 HC RX REV CODE- 250/636: Performed by: NURSE ANESTHETIST, CERTIFIED REGISTERED

## 2020-07-06 PROCEDURE — C1769 GUIDE WIRE: HCPCS | Performed by: INTERNAL MEDICINE

## 2020-07-06 PROCEDURE — 76060000034 HC ANESTHESIA 1.5 TO 2 HR: Performed by: INTERNAL MEDICINE

## 2020-07-06 PROCEDURE — 74011250636 HC RX REV CODE- 250/636: Performed by: INTERNAL MEDICINE

## 2020-07-06 PROCEDURE — 74011636320 HC RX REV CODE- 636/320: Performed by: INTERNAL MEDICINE

## 2020-07-06 PROCEDURE — 77030010803: Performed by: INTERNAL MEDICINE

## 2020-07-06 PROCEDURE — 77030012596 HC SPHNTOM BILI BSC -E: Performed by: INTERNAL MEDICINE

## 2020-07-06 PROCEDURE — 77030007288 HC DEV LOK BILI BSC -A: Performed by: INTERNAL MEDICINE

## 2020-07-06 PROCEDURE — 77030040361 HC SLV COMPR DVT MDII -B: Performed by: INTERNAL MEDICINE

## 2020-07-06 PROCEDURE — 74011000250 HC RX REV CODE- 250: Performed by: NURSE ANESTHETIST, CERTIFIED REGISTERED

## 2020-07-06 PROCEDURE — 77030013992 HC SNR POLYP ENDOSC BSC -B: Performed by: INTERNAL MEDICINE

## 2020-07-06 RX ORDER — EPINEPHRINE 0.1 MG/ML
1 INJECTION INTRACARDIAC; INTRAVENOUS
Status: DISCONTINUED | OUTPATIENT
Start: 2020-07-06 | End: 2020-07-06 | Stop reason: HOSPADM

## 2020-07-06 RX ORDER — ATROPINE SULFATE 0.1 MG/ML
0.5 INJECTION INTRAVENOUS
Status: DISCONTINUED | OUTPATIENT
Start: 2020-07-06 | End: 2020-07-06 | Stop reason: HOSPADM

## 2020-07-06 RX ORDER — FLUMAZENIL 0.1 MG/ML
0.2 INJECTION INTRAVENOUS
Status: ACTIVE | OUTPATIENT
Start: 2020-07-06 | End: 2020-07-06

## 2020-07-06 RX ORDER — FENTANYL CITRATE 50 UG/ML
INJECTION, SOLUTION INTRAMUSCULAR; INTRAVENOUS AS NEEDED
Status: DISCONTINUED | OUTPATIENT
Start: 2020-07-06 | End: 2020-07-06 | Stop reason: HOSPADM

## 2020-07-06 RX ORDER — ONDANSETRON 2 MG/ML
INJECTION INTRAMUSCULAR; INTRAVENOUS AS NEEDED
Status: DISCONTINUED | OUTPATIENT
Start: 2020-07-06 | End: 2020-07-06 | Stop reason: HOSPADM

## 2020-07-06 RX ORDER — SODIUM CHLORIDE 0.9 % (FLUSH) 0.9 %
5-40 SYRINGE (ML) INJECTION AS NEEDED
Status: DISCONTINUED | OUTPATIENT
Start: 2020-07-06 | End: 2020-07-06 | Stop reason: HOSPADM

## 2020-07-06 RX ORDER — EPHEDRINE SULFATE/0.9% NACL/PF 50 MG/5 ML
SYRINGE (ML) INTRAVENOUS
Status: DISCONTINUED
Start: 2020-07-06 | End: 2020-07-06 | Stop reason: HOSPADM

## 2020-07-06 RX ORDER — NALOXONE HYDROCHLORIDE 0.4 MG/ML
0.4 INJECTION, SOLUTION INTRAMUSCULAR; INTRAVENOUS; SUBCUTANEOUS
Status: ACTIVE | OUTPATIENT
Start: 2020-07-06 | End: 2020-07-06

## 2020-07-06 RX ORDER — GLYCOPYRROLATE 0.2 MG/ML
INJECTION INTRAMUSCULAR; INTRAVENOUS AS NEEDED
Status: DISCONTINUED | OUTPATIENT
Start: 2020-07-06 | End: 2020-07-06 | Stop reason: HOSPADM

## 2020-07-06 RX ORDER — DEXTROMETHORPHAN/PSEUDOEPHED 2.5-7.5/.8
1.2 DROPS ORAL
Status: DISCONTINUED | OUTPATIENT
Start: 2020-07-06 | End: 2020-07-06 | Stop reason: HOSPADM

## 2020-07-06 RX ORDER — LIDOCAINE HYDROCHLORIDE 20 MG/ML
INJECTION, SOLUTION EPIDURAL; INFILTRATION; INTRACAUDAL; PERINEURAL AS NEEDED
Status: DISCONTINUED | OUTPATIENT
Start: 2020-07-06 | End: 2020-07-06 | Stop reason: HOSPADM

## 2020-07-06 RX ORDER — SODIUM CHLORIDE 0.9 % (FLUSH) 0.9 %
5-40 SYRINGE (ML) INJECTION EVERY 8 HOURS
Status: DISCONTINUED | OUTPATIENT
Start: 2020-07-06 | End: 2020-07-06 | Stop reason: HOSPADM

## 2020-07-06 RX ORDER — DEXAMETHASONE SODIUM PHOSPHATE 4 MG/ML
INJECTION, SOLUTION INTRA-ARTICULAR; INTRALESIONAL; INTRAMUSCULAR; INTRAVENOUS; SOFT TISSUE AS NEEDED
Status: DISCONTINUED | OUTPATIENT
Start: 2020-07-06 | End: 2020-07-06 | Stop reason: HOSPADM

## 2020-07-06 RX ORDER — PHENYLEPHRINE HCL IN 0.9% NACL 0.4MG/10ML
SYRINGE (ML) INTRAVENOUS AS NEEDED
Status: DISCONTINUED | OUTPATIENT
Start: 2020-07-06 | End: 2020-07-06 | Stop reason: HOSPADM

## 2020-07-06 RX ORDER — PROPOFOL 10 MG/ML
INJECTION, EMULSION INTRAVENOUS AS NEEDED
Status: DISCONTINUED | OUTPATIENT
Start: 2020-07-06 | End: 2020-07-06 | Stop reason: HOSPADM

## 2020-07-06 RX ORDER — EPHEDRINE SULFATE/0.9% NACL/PF 50 MG/5 ML
SYRINGE (ML) INTRAVENOUS AS NEEDED
Status: DISCONTINUED | OUTPATIENT
Start: 2020-07-06 | End: 2020-07-06 | Stop reason: HOSPADM

## 2020-07-06 RX ORDER — SODIUM CHLORIDE 9 MG/ML
INJECTION, SOLUTION INTRAVENOUS
Status: DISCONTINUED | OUTPATIENT
Start: 2020-07-06 | End: 2020-07-06 | Stop reason: HOSPADM

## 2020-07-06 RX ORDER — SUCCINYLCHOLINE CHLORIDE 20 MG/ML
INJECTION INTRAMUSCULAR; INTRAVENOUS AS NEEDED
Status: DISCONTINUED | OUTPATIENT
Start: 2020-07-06 | End: 2020-07-06 | Stop reason: HOSPADM

## 2020-07-06 RX ORDER — FENTANYL CITRATE 50 UG/ML
INJECTION, SOLUTION INTRAMUSCULAR; INTRAVENOUS
Status: COMPLETED
Start: 2020-07-06 | End: 2020-07-06

## 2020-07-06 RX ORDER — ROCURONIUM BROMIDE 10 MG/ML
INJECTION, SOLUTION INTRAVENOUS AS NEEDED
Status: DISCONTINUED | OUTPATIENT
Start: 2020-07-06 | End: 2020-07-06 | Stop reason: HOSPADM

## 2020-07-06 RX ADMIN — ONDANSETRON HYDROCHLORIDE 4 MG: 2 INJECTION, SOLUTION INTRAMUSCULAR; INTRAVENOUS at 12:54

## 2020-07-06 RX ADMIN — SODIUM CHLORIDE: 900 INJECTION, SOLUTION INTRAVENOUS at 14:10

## 2020-07-06 RX ADMIN — GLYCOPYRROLATE 0.2 MG: 0.2 INJECTION, SOLUTION INTRAMUSCULAR; INTRAVENOUS at 12:54

## 2020-07-06 RX ADMIN — GLUCAGON HYDROCHLORIDE 0.5 MG: KIT at 13:02

## 2020-07-06 RX ADMIN — PROPOFOL 50 MG: 10 INJECTION, EMULSION INTRAVENOUS at 13:03

## 2020-07-06 RX ADMIN — Medication 10 MG: at 13:32

## 2020-07-06 RX ADMIN — PHENYLEPHRINE HYDROCHLORIDE 160 MCG: 10 INJECTION INTRAVENOUS at 13:31

## 2020-07-06 RX ADMIN — SUCCINYLCHOLINE CHLORIDE 160 MG: 20 INJECTION, SOLUTION INTRAMUSCULAR; INTRAVENOUS at 12:55

## 2020-07-06 RX ADMIN — SODIUM CHLORIDE: 900 INJECTION, SOLUTION INTRAVENOUS at 13:34

## 2020-07-06 RX ADMIN — SODIUM CHLORIDE: 900 INJECTION, SOLUTION INTRAVENOUS at 12:51

## 2020-07-06 RX ADMIN — PHENYLEPHRINE HYDROCHLORIDE 120 MCG: 10 INJECTION INTRAVENOUS at 13:12

## 2020-07-06 RX ADMIN — GLUCAGON HYDROCHLORIDE 0.5 MG: KIT at 13:16

## 2020-07-06 RX ADMIN — GLUCAGON HYDROCHLORIDE 0.5 MG: KIT at 13:29

## 2020-07-06 RX ADMIN — PHENYLEPHRINE HYDROCHLORIDE 160 MCG: 10 INJECTION INTRAVENOUS at 13:25

## 2020-07-06 RX ADMIN — DEXAMETHASONE SODIUM PHOSPHATE 4 MG: 4 INJECTION, SOLUTION INTRAMUSCULAR; INTRAVENOUS at 12:54

## 2020-07-06 RX ADMIN — ROCURONIUM BROMIDE 10 MG: 10 SOLUTION INTRAVENOUS at 12:53

## 2020-07-06 RX ADMIN — PROPOFOL 150 MG: 10 INJECTION, EMULSION INTRAVENOUS at 12:52

## 2020-07-06 RX ADMIN — PHENYLEPHRINE HYDROCHLORIDE 150 MCG: 10 INJECTION INTRAVENOUS at 13:18

## 2020-07-06 RX ADMIN — PHENYLEPHRINE HYDROCHLORIDE 120 MCG: 10 INJECTION INTRAVENOUS at 13:28

## 2020-07-06 RX ADMIN — PROPOFOL 50 MG: 10 INJECTION, EMULSION INTRAVENOUS at 12:58

## 2020-07-06 RX ADMIN — SODIUM CHLORIDE, SODIUM LACTATE, POTASSIUM CHLORIDE, AND CALCIUM CHLORIDE 1000 ML: 600; 310; 30; 20 INJECTION, SOLUTION INTRAVENOUS at 14:28

## 2020-07-06 RX ADMIN — PHENYLEPHRINE HYDROCHLORIDE 80 MCG: 10 INJECTION INTRAVENOUS at 13:32

## 2020-07-06 RX ADMIN — Medication 10 MG: at 13:56

## 2020-07-06 RX ADMIN — LIDOCAINE HYDROCHLORIDE 100 MG: 20 INJECTION, SOLUTION EPIDURAL; INFILTRATION; INTRACAUDAL; PERINEURAL at 12:51

## 2020-07-06 RX ADMIN — PROPOFOL 50 MG: 10 INJECTION, EMULSION INTRAVENOUS at 13:08

## 2020-07-06 RX ADMIN — FENTANYL CITRATE 50 MCG: 50 INJECTION, SOLUTION INTRAMUSCULAR; INTRAVENOUS at 13:43

## 2020-07-06 RX ADMIN — PROPOFOL 50 MG: 10 INJECTION, EMULSION INTRAVENOUS at 13:20

## 2020-07-06 RX ADMIN — PHENYLEPHRINE HYDROCHLORIDE 80 MCG: 10 INJECTION INTRAVENOUS at 13:23

## 2020-07-06 RX ADMIN — FENTANYL CITRATE 50 MCG: 50 INJECTION, SOLUTION INTRAMUSCULAR; INTRAVENOUS at 13:50

## 2020-07-06 RX ADMIN — Medication 10 MG: at 13:45

## 2020-07-06 NOTE — PERIOP NOTES
TRANSFER - IN REPORT:    Verbal report received from [facility]) on Shoshana Bevel  being received from for ordered procedure      Report consisted of patients Situation, Background, Assessment and   Recommendations(SBAR). Information from the following report(s) SBAR and Kardex was reviewed with the receiving nurse. Opportunity for questions and clarification was provided. Assessment completed upon patients arrival to unit and care assumed. .Patient has been evaluated by anesthesia pre-procedure. Patient alert and oriented. Vital signs will not be charted by the Endoscopy nurse. All vitals, airway, and loc are monitored by anesthesia staff throughout procedure.

## 2020-07-06 NOTE — ANESTHESIA PREPROCEDURE EVALUATION
Relevant Problems   No relevant active problems       Anesthetic History     PONV          Review of Systems / Medical History  Patient summary reviewed, nursing notes reviewed and pertinent labs reviewed    Pulmonary  Within defined limits                 Neuro/Psych       CVA  TIA     Cardiovascular    Hypertension        Dysrhythmias            GI/Hepatic/Renal  Within defined limits              Endo/Other    Diabetes         Other Findings              Physical Exam    Airway  Mallampati: II  TM Distance: > 6 cm  Neck ROM: normal range of motion   Mouth opening: Normal     Cardiovascular  Regular rate and rhythm,  S1 and S2 normal,  no murmur, click, rub, or gallop             Dental  No notable dental hx       Pulmonary  Breath sounds clear to auscultation               Abdominal  GI exam deferred       Other Findings            Anesthetic Plan    ASA: 3  Anesthesia type: general          Induction: Intravenous  Anesthetic plan and risks discussed with: Patient

## 2020-07-06 NOTE — ROUTINE PROCESS
Suly Cedeno 
1955 
[id number] Situation: 
Verbal report received from: Wilda 
Procedure: Procedure(s): ENDOSCOPIC RETROGRADE CHOLANGIOPANCREATOGRAPHY (ERCP) (URGENT) :- 
ENDOSCOPY WITH PROSTHESIS OR STENT REMOVAL Background: 
 
Preoperative diagnosis: STRICTURE OF BILE DUCT Postoperative diagnosis: Pancreatic anastomostic stricture :  Dr. Verona White 
Assistant(s): Endoscopy Technician-1: Andria Wynn Endoscopy RN-1: Maryse Montague RN Specimens: * No specimens in log * H. Pylori  no Assessment: 
Intra-procedure medications Anesthesia gave intra-procedure sedation and medications, see anesthesia flow sheet yes Intravenous fluids: NS@ Gloriann Or Vital signs stable yes Abdominal assessment: round and soft yes Recommendation: 
Discharge patient per MD order ye . Return to floor na Family or Friend fam 
Permission to share finding with family or friend yes

## 2020-07-06 NOTE — H&P
[address]  [address] [facility], [address]  [phone]                                History and Physical     NAME: Narcisa Stone   :  1955   MRN:  [id number]     HPI:  The patient was seen and examined. Past Surgical History:   Procedure Laterality Date    HX GI      COLONOSCOPY, POLYPS (BENIGN)    HX GI  10/06/2017    Viktor [address] [facility]     [address]      Ablation     HX ORTHOPAEDIC      Spinal fusion W/ HARDWARE    HX OTHER SURGICAL  2017    [place] Cath, Dr. Devin Du      PORTACATH - then removed    NEUROLOGICAL PROCEDURE UNLISTED      Arabi hole washout from cerebral hemorrhage     Past Medical History:   Diagnosis Date    Arrhythmia     Previous a.fib, ablation, NSR now; NO LONGER FOLLOWED BY CARDIOLOGIST.     Autoimmune disease ([facility]     SJOGREN'S    Cancer ([facility]     COMMON BILE DUCT, ADENOCARCINOMA    Diabetes ([facility]     Family history of skin cancer     Hypertension     Sepsis ([facility] 2017    STENTING TO BILE DUCT    Status post chemotherapy     Stroke [facility]) 2008    brain aneurysm - no deficits    Sun-damaged skin     Sunburn, blistering      Social History     Tobacco Use    Smoking status: Never Smoker    Smokeless tobacco: Never Used   Substance Use Topics    Alcohol use: Never     Frequency: Never     Comment: very rare    Drug use: No     Allergies   Allergen Reactions    Shellfish Derived Anaphylaxis     Soft shell crabs    Morphine Nausea and Vomiting    Pcn [Penicillins] Other (comments)     Pt stated \"always been told PCN\"  Pt tolerated other cephalosporins in the past     Family History   Problem Relation Age of Onset    Cancer Father         Breast and Colon    Cancer Mother         LEUKEMIA    No Known Problems Sister     No Known Problems Brother     No Known Problems Sister     Anesth Problems Neg Hx      Current Facility-Administered Medications Medication Dose Route Frequency    sodium chloride (NS) flush 5-40 mL  5-40 mL IntraVENous Q8H    sodium chloride (NS) flush 5-40 mL  5-40 mL IntraVENous PRN    naloxone (NARCAN) injection 0.4 mg  0.4 mg IntraVENous Multiple    flumazeniL (ROMAZICON) 0.1 mg/mL injection 0.2 mg  0.2 mg IntraVENous Multiple    simethicone (MYLICON) 23LR/7.2OO oral drops 80 mg  1.2 mL Oral Multiple    atropine injection 0.5 mg  0.5 mg IntraVENous ONCE PRN    EPINEPHrine (ADRENALIN) 0.1 mg/mL syringe 1 mg  1 mg Endoscopically ONCE PRN    glucagon (GLUCAGEN) injection 1 mg  1 mg IntraVENous ONCE PRN    iopamidoL (ISOVUE 300) 61 % contrast injection 50 mL  50 mL Other ENDO ONCE         PHYSICAL EXAM:  General: WD, WN. Alert, cooperative, no acute distress    HEENT: NC, Atraumatic. PERRLA, EOMI. Anicteric sclerae. Lungs:  CTA Bilaterally. No Wheezing/Rhonchi/Rales. Heart:  Regular  rhythm,  No murmur, No Rubs, No Gallops  Abdomen: Soft, Non distended, Non tender. +Bowel sounds, no HSM  Extremities: No c/c/e  Neurologic:  CN 2-12 gi, Alert and oriented X 3. No acute neurological distress   Psych:   Good insight. Not anxious nor agitated. The heart, lungs and mental status were satisfactory for the administration of MAC sedation and for the procedure. Mallampati score: 3     The patient was counseled at length about the risks of sofía Covid-19 in the mervin-operative and post-operative states including the recovery window of their procedure. The patient was made aware that sofía Covid-19 after a surgical procedure may worsen their prognosis for recovering from the virus and lend to a higher morbidity and or mortality risk. The patient was given the options of postponing their procedure. All of the risks, benefits, and alternatives were discussed. The patient does  wish to proceed with the procedure.       Assessment:   · Pancreatic anastomotic stricture    Plan:   · Endoscopic procedure  · GA

## 2020-07-06 NOTE — ANESTHESIA POSTPROCEDURE EVALUATION
[facility] is requesting refill of.  Disp Refills Start End     oxyCODONE-acetaminophen (PERCOCET)  MG per tablet 40 tablet 0 7/17/2019     Sig - Route: Take 1 tablet by mouth every 6 hours as needed for Pain. Do not start before July 17, 2019. - Oral      Last Office Visit: 7/18/2019  Next Office Visit: Visit date not found    Last BP:   BP Readings from Last 2 Encounters:   07/18/19 124/82   05/28/19 110/70        Procedure(s):  ENDOSCOPIC RETROGRADE CHOLANGIOPANCREATOGRAPHY (ERCP) (URGENT) :-  ENDOSCOPY WITH PROSTHESIS OR STENT REMOVAL. general    Anesthesia Post Evaluation      Multimodal analgesia: multimodal analgesia used between 6 hours prior to anesthesia start to PACU discharge  Patient location during evaluation: bedside  Patient participation: waiting for patient participation  Level of consciousness: awake  Pain management: adequate  Airway patency: patent  Anesthetic complications: no  Cardiovascular status: acceptable  Respiratory status: unassisted  Hydration status: acceptable  Comments: Post-Anesthesia Evaluation and Assessment    I have evaluated the patient and they are ready for PACU discharge. Patient: Katt Lopez MRN: [id number]  SSN: [id number]   YOB: 1955  Age: 62 y.o. Sex: male      Cardiovascular Function/Vital Signs  /58   Pulse 83   Temp 36 °C (96.8 °F)   Resp 30   Ht 5' 8\" (1.727 m)   Wt 68 kg (150 lb)   SpO2 100%   BMI 22.81 kg/m²     Patient is status post General anesthesia for Procedure(s):  ENDOSCOPIC RETROGRADE CHOLANGIOPANCREATOGRAPHY (ERCP) (URGENT) :-  ENDOSCOPY WITH PROSTHESIS OR STENT REMOVAL. Nausea/Vomiting: None    Postoperative hydration reviewed and adequate. Pain:  Pain Scale 1: Numeric (0 - 10) (07/06/20 1418)  Pain Intensity 1: 0 (07/06/20 1418)   Managed    Neurological Status: At baseline    Mental Status, Level of Consciousness: Alert and  oriented to person, place, and time    Pulmonary Status:   O2 Device: Nasal cannula (07/06/20 1418)   Adequate oxygenation and airway patent    Complications related to anesthesia: None    Post-anesthesia assessment completed.  No concerns    Signed By: Vanessa Pino MD    July 6, 2020                   INITIAL Post-op Vital signs:   Vitals Value Taken Time   BP 0/0 7/6/2020  2:26 PM   Temp     Pulse 83 7/6/2020  2:29 PM   Resp 13 7/6/2020  2:29 PM   SpO2 95 % 7/6/2020  2:29 PM   Vitals shown include unvalidated device data.

## 2020-07-06 NOTE — PROCEDURES
[facility]  [address] [address] [address], [address]   [phone]                   ERCP NOTE    NAME:  Neville Mckeon   :   1955   MRN:   [id number]     Date/Time:  2020 2:01 PM    Procedure Type:   ERCPwith pancreatic stent removal     Indications: Pancreatic anastomotic stricture  Pre-operative Diagnosis: see indication above  Post-operative Diagnosis:  See findings below  : Gely Wakefield MD    Staff: Endoscopy Technician-1: Sukhi SIMON  Endoscopy RN-1: Sean Zee RN     Implants: none    Referring Provider:    Melany Merlin MD, Tom Ramires DO    Sedation:  General anesthesia    Procedure Details:  After informed consent was obtained with all risks and benefits of procedure explained, the patient was taken to the fluoroscopy suite and placed in the prone position. Upon sequential sedation as per above, the [address] was inserted via the mouthpeice and carefully advanced to the pancreatico-biliary limb. The quality of visualization was fair. The duodenoscope was withdrawn into a short position. Findings:   Esophagus:normal  Stomach: normal mucosa. Lot of bile was seen in the stomach  Duodenum/jejunum: Pylorus preserving Whipple procedure anatomy was noted. Pancreatico-biliary limb was entered till the blind end and was normal. Stenosis was noted at the anastomosis of the pancreaticio-biliary limb. This was traversed  Ampulla:surgically absent  Cholangiogram: -not performed.   Pancreatogram:-Pancreatico-jejunal anastomosis was noted. One pancreatic stent was removed from the pancreatic duct using a snare. The opening was stenotic. Cannulation was performed using a Dreamwire. Contrast was not injected gently. Pancreatic duct was mildly dilated and measured about 5 mm in largest diameter. The Dreamwire slipped out and I was not able to secure it in the pancreatic duct.  Hence no stent placement was done.    Specimen Removed: * No specimens in log *    Complications: None. EBL:  None. Interventions:    Pancreatic: Pancreatico-jejunal anastomosis was noted. One pancreatic stent was removed from the pancreatic duct using a snare. The opening was stenotic. Cannulation was performed using a Dreamwire. Contrast was not injected gently. Pancreatic duct was mildly dilated and measured about 5 mm in largest diameter. The Dreamwire slipped out and I was not able to secure it in the pancreatic duct. Hence no stent placement was done. Biliary: none    Impression:  -Pancreatic anastomotic stricture    Recommendations:  Clear liquid diet and advance as tolerated. Resume normal medication(s).   Monitor clinical course      Discharge Disposition:  Home following recovery in Endoscopy    Herberth Navarro MD  7/6/2020  2:01 PM

## 2020-07-07 ENCOUNTER — DOCUMENTATION ONLY (OUTPATIENT)
Dept: SURGERY | Age: 65
End: 2020-07-07

## 2020-07-07 ENCOUNTER — PATIENT OUTREACH (OUTPATIENT)
Dept: OTHER | Age: 65
End: 2020-07-07

## 2020-07-07 NOTE — PROGRESS NOTES
Verified  and address for HIPAA security. Introduced [employer] for patient. Patient does not identify any Care Management needs at this time and declines services. CM spoke with patient, who is 1 day S/P ERCP for Pancreatic Anastomotic Stricture with stent removal. Patient with hx of Extrahepatic Cholangiocarcinoma, S/P Whipple in 2017. Patient reports he is doing great, no pain, no problems since procedure. States he has minimal nausea which is not unusual for him. Tolerating food and drink as usual and has returned to normal activity today. Reviewed Red Flag Symptoms and discharge instructions. Patient verbalized understanding and states his spouse is an RN and is well aware of symptoms to watch for. Patient declined services at this time, but has CM contact information if needed.

## 2020-07-15 ENCOUNTER — TELEPHONE (OUTPATIENT)
Dept: INTERNAL MEDICINE CLINIC | Age: 65
End: 2020-07-15

## 2020-07-15 ENCOUNTER — VIRTUAL VISIT (OUTPATIENT)
Dept: ONCOLOGY | Age: 65
End: 2020-07-15

## 2020-07-15 DIAGNOSIS — G89.3 CANCER RELATED PAIN: ICD-10-CM

## 2020-07-15 DIAGNOSIS — R19.7 DIARRHEA DUE TO MALABSORPTION: ICD-10-CM

## 2020-07-15 DIAGNOSIS — C22.1 CHOLANGIOCARCINOMA OF BILIARY TRACT (HCC): Primary | ICD-10-CM

## 2020-07-15 DIAGNOSIS — R11.2 NAUSEA AND VOMITING, INTRACTABILITY OF VOMITING NOT SPECIFIED, UNSPECIFIED VOMITING TYPE: ICD-10-CM

## 2020-07-15 DIAGNOSIS — K90.9 DIARRHEA DUE TO MALABSORPTION: ICD-10-CM

## 2020-07-15 RX ORDER — CYANOCOBALAMIN 1000 UG/ML
INJECTION, SOLUTION INTRAMUSCULAR; SUBCUTANEOUS
Qty: 6 ML | Refills: 6
Start: 2020-07-15 | End: 2020-10-20 | Stop reason: SDUPTHER

## 2020-07-15 RX ORDER — CYANOCOBALAMIN 1000 UG/ML
INJECTION, SOLUTION INTRAMUSCULAR; SUBCUTANEOUS
Qty: 6 ML | Refills: 6 | Status: SHIPPED | OUTPATIENT
Start: 2020-07-15 | End: 2020-08-26 | Stop reason: SDUPTHER

## 2020-07-15 NOTE — PROGRESS NOTES
[address] [facility]  at [address] [place] [address], [address] [facility]  [phone]       Follow-up Note      Patient: Santino Castelan MRN: [id number]  SSN: [id number]    YOB: 1955  Age: 62 y.o. Sex: male          Reason for Visit:   Santino Castelan is a 62 y.o. male who is seen by synchronous (real-time) audio-video technology for follow up of cholangiocarcinoma      Diagnosis:     1. 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 Treatment:     1. S/P Pancreatoduodenectomy on  10/06/2017  2. Adjuvant monotherapy with Capecitabine - s/p 6 cycles   (12/4/2017 - 05/2018)    Subjective:      Santino Castelan is a 62 y.o. male with a diagnosis of extrahepatic cholangiocarcinoma. He presented to the ED in August 2017 with obstructive jaundice. He was found to have an obstructive lesion in the dital bile duct. The CT showed marked intrahepatic biliary dilation and common bile duct dilation to the level of the mid common bile duct, which ws markedly narrowed. He underwent a stenting procedure followed by spyglass cholangiogram. The brushings revealed a diagnosis of cholangiocarcinoma. He underwent a Whipple procedure on 10/06/2017. He completed 6 cycles of adjuvant Xeloda. Mr. Janee Hamilton continues to have daily episodes of bilious emesis almost daily. PET scan showed increased activity in the soft tissue along the SMA. CT guided biopsy showed local recurrence. He underwent SBRT by Dr. Carol Shearer in April 2020. PET shows residual disease. Pain is controlled with fentanyl. Vomiting and diarrhea is ongoing. He is maintaining his weight.          Review of Systems:    Constitutional: negative  Eyes: negative  Ears, Nose, Mouth, Throat, and Face: negative  Respiratory: negative  Cardiovascular: negative  Gastrointestinal: occasional emesis  Genitourinary:negative  Integument/Breast: negative  Hematologic/Lymphatic: negative  Musculoskeletal:negative  Neurological: negative      Past Medical History:   Diagnosis Date    Arrhythmia     Previous a.fib, ablation, NSR now; NO LONGER FOLLOWED BY CARDIOLOGIST.  Autoimmune disease ([facility]     SJOGREN'S    Cancer ([facility]     COMMON BILE DUCT, ADENOCARCINOMA    Diabetes ([facility]     Family history of skin cancer     Hypertension     Sepsis ([facility] 2017    STENTING TO BILE DUCT    Status post chemotherapy     Stroke [facility]) 2008    brain aneurysm - no deficits    Sun-damaged skin     Sunburn, blistering      Past Surgical History:   Procedure Laterality Date    HX GI      COLONOSCOPY, POLYPS (BENIGN)    HX GI  10/06/2017    Viktor Kevin [address] [place], [place] [address] [facility] [address]  2005    Ablation     HX ORTHOPAEDIC  2000    Spinal fusion W/ HARDWARE    HX OTHER SURGICAL  11/07/2017    Israel Cath, Dr. Rosslyn Denver  2017    PORTACATH - then removed    NEUROLOGICAL PROCEDURE UNLISTED  2005    Hoda Katayama hole washout from cerebral hemorrhage      Family History   Problem Relation Age of Onset    Cancer Father         Breast and Colon    Cancer Mother         LEUKEMIA    No Known Problems Sister     No Known Problems Brother     No Known Problems Sister     Anesth Problems Neg Hx      Social History     Tobacco Use    Smoking status: Never Smoker    Smokeless tobacco: Never Used   Substance Use Topics    Alcohol use: Never     Frequency: Never     Comment: very rare      Prior to Admission medications    Medication Sig Start Date End Date Taking? Authorizing Provider   fentaNYL (DURAGESIC) 25 mcg/hr PATCH 1 Patch by TransDERmal route every seventy-two (72) hours for 30 days. Max Daily Amount: 1 Patch. 7/23/20 8/22/20 Yes Kristin Cat MD   traMADoL (ULTRAM) 50 mg tablet Take 1 Tab by mouth every six (6) hours as needed for Pain for up to 30 days.  Max Daily Amount: 200 mg. 7/23/20 8/22/20 Yes Suzette Santoyo MD   Jardiance 25 mg tablet TAKE 1 TABLET BY MOUTH EVERY DAY 6/8/20  Yes Anderson Smith MD   Creon 36,000-114,000- 180,000 unit cpDR capsule TAKE 2 3 CAPSULES WITH EACH MEAL AND 1 2 WITH SNACK 4/10/20  Yes Provider, Historical   finasteride (PROSCAR) 5 mg tablet TAKE 1 TABLET BY MOUTH EVERY DAY 4/13/20  Yes Provider, Historical   cholestyramine-aspartame (QUESTRAN LIGHT) 4 gram packet Take 4 g by mouth three (3) times daily. With meals   Yes Provider, Historical   gabapentin (NEURONTIN) 100 mg capsule Take  by mouth two (2) times a day. Not sure of dose but takes 3 pills 2 times a day   Yes Provider, Historical   ramipriL (ALTACE) 10 mg capsule TAKE 1 CAPSULE BY MOUTH EVERY DAY AT NIGHT 3/29/20  Yes Silas Raya III, DO   ondansetron (ZOFRAN ODT) 4 mg disintegrating tablet Take 1 Tab by mouth every eight (8) hours as needed for Nausea. 2/24/20  Yes Silas Raya III, DO   acetaminophen (TYLENOL) 325 mg tablet Take 2 Tabs by mouth every four (4) hours as needed for Pain or Fever (Over the counter medication). 1/2/20  Yes Dary Nicholas NP   sildenafil citrate (VIAGRA) 50 mg tablet Take 1 Tab by mouth as needed (ED). 5/6/19  Yes Silas Raya III, DO   tamsulosin (FLOMAX) 0.4 mg capsule TAKE ONE CAPSULE BY MOUTH EVERY EVENING 2/28/19  Yes Provider, Historical   alpha-D-galactosidase (BEANO PO) Take 1 Tab by mouth two (2) times a day. Yes Kimberlee, MD Flynn   cetirizine (ZYRTEC) 10 mg tablet Take 10 mg by mouth nightly. Yes Provider, Historical   cyanocobalamin (VITAMIN B12) 1,000 mcg/mL injection INJECT CONTENTS OF ONE VIAL INTRAMUSCULARLY EVERY OTHER WEEK 7/15/20   Silas Raya III, DO   cyanocobalamin (VITAMIN B12) 1,000 mcg/mL injection INJECT CONTENTS OF ONE VIAL INTRAMUSCULARLY EVERY OTHER WEEK 7/15/20   Czajkowski, [place] Jacobson B III, DO   sucralfate (CARAFATE) 1 gram tablet Take 1 g by mouth four (4) times daily.     Provider, Historical   omeprazole (PRILOSEC) 40 mg capsule Take 1 Cap by mouth daily. 11/5/19   Cristofer Cole MD          Allergies   Allergen Reactions    Shellfish Derived Anaphylaxis     Soft shell crabs    Morphine Nausea and Vomiting    Pcn [Penicillins] Other (comments)     Pt stated \"always been told PCN\"  Pt tolerated other cephalosporins in the past           Objective:     General: alert, cooperative, no distress   Mental  status: normal mood, behavior, speech, dress, motor activity, and thought processes, able to follow commands   HENT: NCAT   Neck: no visualized mass   Resp: no respiratory distress   Neuro: no gross deficits   Skin: no discoloration or lesions of concern on visible areas   Psychiatric: normal affect, consistent with stated mood, no evidence of hallucinations       Due to this being a TeleHealth evaluation (During [place] [address] public health emergency), many elements of the physical examination are unable to be assessed. Evaluation of the following organ systems was limited: Vitals/Constitutional/EENT/Resp/CV/GI//MS/Neuro/Skin/Heme-Lymph-Imm. PET Results (most recent):  Results from [place] encounter on 06/02/20   PET/CT TUMOR IMAGE SKULL THIGH (SUB)    Narrative PET/CT SCAN    PROCEDURE: Following IV injection of 10.4 mCi 18 Fluoro 2 deoxyglucose (FDG) and  a standard uptake delay, PET imaging is performed from head to thighs and axial,  sagittal and coronal images were acquired. Unenhanced CT is obtained for  anatomic localization, and attenuation correction of the PET scan. Patient  preprocedure blood glucose level: 91mg/dL. CORRELATIVE IMAGING STUDIES: CT CAP 5/13/2020. PRIOR PET: 1/21/2020. HISTORY: The study is requested for subsequent treatment strategy. Restaging  cholangiocarcinoma. FINDINGS:  HEAD/NECK: No apparent foci of abnormal hypermetabolism. Cerebral evaluation is  limited by normal intense activity.     CHEST: No foci of abnormal hypermetabolism. ABDOMEN/PELVIS: The para-aortic soft tissue encasing the SMA is SUV 6, previous  4. SKELETON: No foci of abnormal hypermetabolism in the axial and visualized  appendicular skeleton. Impression IMPRESSION:   1. Persistent para-aortic tumor with minimal interval increased SUV.  2. Otherwise stable/negative PET. I personally reviewed the images. Para aortic mass. Assessment:     1. Extrahepatic cholangiocarcinoma:    T3 N1 (1 of 12 LN +ve)  Invasion into pancreas  R0 resection    > S/P Pancreatoduodenectomy on  10/06/2017    Completed adjuvant treatment with single agent Capecitabine - s/p 6 cycles (12/4/2017 - 05/2018). Now with local recurrence in the para-aortic soft tissue     CT guided biopsy 2/3/2020  Of para -aortic soft tissue mass - + for adenocarcinoma  S/P SBRT     Repeat CT - ? Progression vs radiation effect    Will repeat PET next month  Tumor marker level       2. Cancer related pain    Management per Dr. Brian Bull      3. Nausea and Vomiting    Ongoing  Management per Palliative and Dr. Brian Bull      4. Diarrhea due to malabsorption    On anti diarrheals, pancreatic enzymes and bile sequestrant  May benefit from Octreotide      Plan:       > Octerotide for diarrhea - will discuss with Dr. Brian Bull  > Repeat PET CT in 1 month      Signed by: Broderick Justin MD                     July 15, 2020      CC. Alfred Zuniga MD  CC. Melany Merlin, MD  CC. Griselda Conte MD  CC. Suzanne Ferreira MD  CC. Shlomo Ryan MD      I was in the office while conducting this encounter. The patient was at his home. Consent:  He and/or his healthcare decision maker is aware that this patient-initiated Telehealth encounter is a billable service, with coverage as determined by his insurance carrier.  He is aware that he may receive a bill and has provided verbal consent to proceed: Yes    Pursuant to the emergency declaration under the [address] and the [facility] waiver authority and the [employer] Act, this Virtual  Visit was conducted, with patient's (and/or legal guardian's) consent, to reduce the patient's risk of exposure to COVID-19 and provide necessary medical care. Services were provided through a video synchronous discussion virtually to substitute for in-person visit.

## 2020-07-15 NOTE — TELEPHONE ENCOUNTER
PCP: Ignacia Lieberman,     Last appt: 4/6/2020  Future Appointments   Date Time Provider Sloane Roach   7/27/2020 11:00 AM Rashi Brown NP [facility]   8/6/2020  9:00 AM MD Juliocesar Chairez   8/26/2020  9:30 AM Chun Fields MD [facility] [address]       Requested Prescriptions     Pending Prescriptions Disp Refills    cyanocobalamin (VITAMIN B12) 1,000 mcg/mL injection 6 mL 6     Sig: INJECT CONTENTS OF ONE VIAL INTRAMUSCULARLY EVERY OTHER WEEK

## 2020-07-15 NOTE — TELEPHONE ENCOUNTER
----- Message from Dawn Frederick sent at 7/15/2020 11:47 AM EDT -----  Regarding: Dr. Pearce Richa  Patient's wife states she has left several messages on My Chart with no response, patient would like a refill of his Vitamin B12 injectable Medication sent to [facility], pls contact pt at .

## 2020-07-15 NOTE — PROGRESS NOTES
Pt reports since last week has been tramadol three times a day of his abdomen. He has a fentanyl patch also and says he has been using that mainly for his back. Pt has been having diarrhea since stent placement in Feb and April, he was supposed to get a stent in 7/6 but couldn't so took old ones,, 2 times a day with imodium but if he doesn't take it then he \"goes all the time\". This has been going on since October. pt is taking creon as directed and also on skylar espino and . pt is weighing 145lbs. Pt was 151lbs in 4/2020. Pt reports his appetite is good he eats more than he should. Vomiting also which is randomly. Between 2am and 5am he usually vomits and then randomly during the day since Whipple. Yellow liquid noted. Pt follows with palliative care. He is not taking carafate but if needing to take tramdol TID then he should. 1. Have you been to the ER, urgent care clinic since your last visit? Hospitalized since your last visit? No    2. Have you seen or consulted any other health care providers outside of the [address] [place] [address] since your last visit? Include any pap smears or colon screening.  YES GI MD [facility]

## 2020-07-27 DIAGNOSIS — C24.9 CHOLANGIOCARCINOMA DETERMINED BY BIOPSY OF BILIARY TRACT (HCC): ICD-10-CM

## 2020-07-27 RX ORDER — FENTANYL 25 UG/1
1 PATCH TRANSDERMAL
Qty: 10 PATCH | Refills: 0 | Status: SHIPPED | OUTPATIENT
Start: 2020-08-24 | End: 2020-08-26 | Stop reason: DRUGHIGH

## 2020-07-27 NOTE — TELEPHONE ENCOUNTER
Triage for Controlled Substance Refill Request    Pain Diagnosis: _Cholangiocarcinoma determined by biopsy of biliary tract     Last Outpatient Visit: _7/1/2020    Next Outpatient Visit: _4/43/8435    Reason for refill needed outside of office visit?   -Appointment not scheduled prior to need for scheduled refill      Pharmacy: [facility] [address] ([facility]) 25 mcg/hr PATCH  Dose and directions:1 Patch by TransDERmal route every seventy-two (72) hours   Number dispensed:10  Date filled ( or Pharmacy):7/27/2020  # left:1       reviewed: _yes     Date of Urine Drug Screen:  _n/a    Opioid Safety Handout given:  _yes     Appropriate for refill:  _yes     Action:  _ please refill

## 2020-07-28 DIAGNOSIS — C22.1 CHOLANGIOCARCINOMA OF BILIARY TRACT (HCC): Primary | ICD-10-CM

## 2020-07-28 NOTE — PROGRESS NOTES
PARESH FROM DR Anne-Marie Barrios PET/CT TUMOR IMG SKULL THIGH SUB TO BE DONE 1 MONTH FROM LAST SCAN.

## 2020-07-31 ENCOUNTER — TELEPHONE (OUTPATIENT)
Dept: ONCOLOGY | Age: 65
End: 2020-07-31

## 2020-07-31 NOTE — TELEPHONE ENCOUNTER
Can put him on for Thursday morning for a virtual visit at [address] [place] [address]. If he wants to come in instead then let him.

## 2020-07-31 NOTE — TELEPHONE ENCOUNTER
Pt has PET scheduled for 08/05/2020 please let me know what day to add pt on for. best contact [phone]

## 2020-08-03 ENCOUNTER — TELEPHONE (OUTPATIENT)
Dept: ONCOLOGY | Age: 65
End: 2020-08-03

## 2020-08-03 NOTE — TELEPHONE ENCOUNTER
Needs a Peer to peer fpr PET scan.    Jose Starr Case# [id number]    Call Linda Castillo if Lisa Charles [phone]

## 2020-08-13 DIAGNOSIS — E11.9 TYPE 2 DIABETES MELLITUS WITHOUT COMPLICATION, WITHOUT LONG-TERM CURRENT USE OF INSULIN (HCC): ICD-10-CM

## 2020-08-17 DIAGNOSIS — E11.9 TYPE 2 DIABETES MELLITUS WITHOUT COMPLICATION, WITHOUT LONG-TERM CURRENT USE OF INSULIN (HCC): ICD-10-CM

## 2020-08-18 ENCOUNTER — PATIENT MESSAGE (OUTPATIENT)
Dept: PALLATIVE CARE | Age: 65
End: 2020-08-18

## 2020-08-19 DIAGNOSIS — C24.9 CHOLANGIOCARCINOMA DETERMINED BY BIOPSY OF BILIARY TRACT (HCC): Primary | ICD-10-CM

## 2020-08-19 RX ORDER — FENTANYL 37.5 UG/H
37.5 PATCH, EXTENDED RELEASE TRANSDERMAL
Qty: 10 PATCH | Refills: 0 | Status: SHIPPED | OUTPATIENT
Start: 2020-08-19 | End: 2020-08-26 | Stop reason: SDUPTHER

## 2020-08-19 NOTE — TELEPHONE ENCOUNTER
----- Message from [place] Cristina sent at 8/18/2020  4:59 PM EDT -----  Regarding: Update Medical Information  Contact: [phone]  Hello Dr. Claudeen Bing,  I wanted to give you an update on Rohit. His pain is increasing in his back, ribs, and groin. He went to the Urologist today due to the groin pain and urination difficulty. I am wondering if he needs an increase in the fentanyl patch dose. He is taking 3 Tramadol tabs every day. Dr Eric Sotelo is trying to get another PET Scan but the insurance company continues to deny the scan. I am concerned that he has not had any blood work drawn since January 1 except for Tumor Markers. I would appreciate your thoughts.   Thank Helder Gonzalez

## 2020-08-19 NOTE — TELEPHONE ENCOUNTER
Spoke with patient and advised per Dr. Ragini Fitzgerald she will increase Fentanyl patch from 25 mcg to 37.5 mcg every 3 days for pain, and that she will discuss lab work at next visit schedule for 8/26, unless he would prefer to do it sooner. Patient stated that he prefers to wait on lab work and discussed during visit.     Advised to call or send my chart message as needed with any additional questions or concerns

## 2020-08-26 ENCOUNTER — VIRTUAL VISIT (OUTPATIENT)
Dept: PALLATIVE CARE | Age: 65
End: 2020-08-26
Payer: COMMERCIAL

## 2020-08-26 DIAGNOSIS — R11.2 INTRACTABLE NAUSEA AND VOMITING: ICD-10-CM

## 2020-08-26 DIAGNOSIS — K59.1 FUNCTIONAL DIARRHEA: ICD-10-CM

## 2020-08-26 DIAGNOSIS — K85.90 RECURRENT PANCREATITIS: ICD-10-CM

## 2020-08-26 DIAGNOSIS — C24.9 CHOLANGIOCARCINOMA DETERMINED BY BIOPSY OF BILIARY TRACT (HCC): ICD-10-CM

## 2020-08-26 DIAGNOSIS — R10.10 PAIN OF UPPER ABDOMEN: Primary | ICD-10-CM

## 2020-08-26 PROCEDURE — 99215 OFFICE O/P EST HI 40 MIN: CPT | Performed by: INTERNAL MEDICINE

## 2020-08-26 RX ORDER — FENTANYL 37.5 UG/H
37.5 PATCH, EXTENDED RELEASE TRANSDERMAL
Qty: 10 PATCH | Refills: 0 | Status: SHIPPED | OUTPATIENT
Start: 2020-09-18 | End: 2020-10-15

## 2020-08-26 RX ORDER — TRAMADOL HYDROCHLORIDE 50 MG/1
50 TABLET ORAL
COMMUNITY
End: 2020-09-02 | Stop reason: SDUPTHER

## 2020-08-26 NOTE — PATIENT INSTRUCTIONS
Dear Santino Castelan , It was a pleasure seeing you today in your home along with your wife virtually We will see you again in 2 months If labs or imaging tests have been ordered for you today, please call the office  at [phone] 48 hours after completion to obtain the results. Your stated goal:  
- better pain management Your described symptoms were: Fatigue: 5 Drowsiness: 5 Depression: 0 Pain: 7 Anxiety: 0 Nausea: 10 Anorexia: 5 Dyspnea: 2 Best Well-Bein Constipation: No  
Other Problem (Comment): 0 This is the plan we talked about: 1. Mid back pain and new mid upper abdominal pain - The back pain is much better with SBRT to the back which you completed 2 weeks ago. 
-We tried to wean you off of the fentanyl to see how you did but the pain returned and you did not do well.   
-You are doing well on fentanyl 25 mcg patch every 3 days up until 2 to 3 weeks ago where the pain has been increasing slowly and you have been taking tramadol up to 4 tablets a day. The pain is not sufficiently controlled with 25 mcg patch anymore so we increased it to 37.5 mcg patches. For now let us continue at this dosage every 72 hours. -You have now been using tramadol 50 mg 3 times a day on a regular basis for the new mid upper abdominal pain. -They could also be gastric reflux component to this and therefore please try taking Carafate 3 times a day to see if this helps. Continue Prilosec 40 mg daily. 
-Dr. Madhu Ryan is working on repeating the PET scan to see why the pain is increased. The PET scan in  still showed para-aortic soft tissue mass which is the cause of your pain at this time. 2. Functional diarrhea 
-You continue to have loose stools. You are now on pancreatic enzymes and cholestyramine. Let us see how this helps. - We talked about drinking coconut water and lemonade to help with electrolyte replacement. 3.  Nausea and vomiting -We have now identified that the nausea and vomiting is very much related to regurgitation of undigested food back into your stomach. We think this is because of the anatomical disturbance that the Whipple procedure has caused and that is why her medications are not very helpful. Zofran still helps with the nausea but you always feel relieved only after vomiting. 
-You are getting a pancreatic stent placed next week 4. We completed an AMD naming your wife as Nikko. 5.  Next steps 
-Dr. Vasiliy Parra wants to wait on discussing reversal of the Whipple procedure for some more time. You want to hold off until the COVID 19 curve flattens in [place] before the elective surgery but convinced that the surgery will be the answer to your vomiting problem. This is what you have shared with us about Advance Care Planning: 
 
  Primary Decision Maker: Quincy Lang Spouse - [phone] Secondary Decision Maker: Jeffery Hays - Child - [phone] Supplemental (Other) Decision Maker: Roberto Cristina - Child - [phone] Advance Care Planning 8/26/2020 Patient's Healthcare Decision Maker is: Named in scanned ACP document Primary Decision Maker Name -  
Primary Decision Maker Phone Number -  
Primary Decision Maker Relationship to Patient -  
Confirm Advance Directive Yes, on file Patient Would Like to Complete Advance Directive - Does the patient have other document types - The Palliative Medicine Team is here to support you and your family.   
 
 
Sincerely, 
 
 
Jasper Cantor MD and the Palliative Medicine Team

## 2020-08-26 NOTE — PROGRESS NOTES
Palliative Medicine Outpatient Services  [place]: [phone] ([phone])    Patient Name: Jaclyn Padilla  YOB: 1955    Date of Current Visit: 08/26/20  Location of Current Visit:    [] [facility] Office  [] [facility] Office  [] [facility] Office  [] Home  [x] Other: Virtual synchronous A/V visit    Date of Initial Visit: 4/6/2020  Referral from: Dr. Alfonso Justin  Primary Care Physician: Warren Rodrigues DO      SUMMARY:   Jaclyn Padilla is a 62y.o. 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 Alfonso Justin for management of symptoms and psychosocial support. 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 This is second marriage for both of them. Current treatment-completed RT to the para-aortic mass, pursuing diagnostics with GI physician Dr. Rudy Torre for gastroparesis and pancreatic enzymes, has had biliary stents replaced twice, third 1 scheduled for August 2020. Patient wants reversal of his Whipple after that. He follows closely with Dr. Bryce Manriquez with general surgery. PALLIATIVE DIAGNOSES:       ICD-10-CM ICD-9-CM    1. Pain of upper abdomen  R10.10 789.09    2. Cholangiocarcinoma determined by biopsy of biliary tract (HCC)  C24.9 156.9 fentaNYL 37.5 mcg/hour pt72   3. Recurrent pancreatitis  K85.90 577.1    4. Intractable nausea and vomiting  R11.2 536.2    5.  Functional diarrhea  K59.1 564.5           PLAN:   Patient Instructions     Dear Jaclyn Padilla ,    It was a pleasure seeing you today in your home along with your wife virtually    We will see you again in 2 months    If labs or imaging tests have been ordered for you today, please call the office  at [phone] 48 hours after completion to obtain the results. Your stated goal:   - better pain management    Your described symptoms were: Fatigue: 5 Drowsiness: 5   Depression: 0 Pain: 7   Anxiety: 0 Nausea: 10   Anorexia: 5 Dyspnea: 2   Best Well-Bein Constipation: No   Other Problem (Comment): 0       This is the plan we talked about:    1. Mid back pain and new mid upper abdominal pain  - The back pain is much better with SBRT to the back which you completed 2 weeks ago.  -We tried to wean you off of the fentanyl to see how you did but the pain returned and you did not do well.    -You are doing well on fentanyl 25 mcg patch every 3 days up until 2 to 3 weeks ago where the pain has been increasing slowly and you have been taking tramadol up to 4 tablets a day. The pain is not sufficiently controlled with 25 mcg patch anymore so we increased it to 37.5 mcg patches. For now let us continue at this dosage every 72 hours. -You have now been using tramadol 50 mg 3 times a day on a regular basis for the new mid upper abdominal pain. -They could also be gastric reflux component to this and therefore please try taking Carafate 3 times a day to see if this helps. Continue Prilosec 40 mg daily.  -Dr. Abran Wells is working on repeating the PET scan to see why the pain is increased. The PET scan in  still showed para-aortic soft tissue mass which is the cause of your pain at this time. 2. Functional diarrhea  -You continue to have loose stools. You are now on pancreatic enzymes and cholestyramine. Let us see how this helps. - We talked about drinking coconut water and lemonade to help with electrolyte replacement.     3.  Nausea and vomiting  -We have now identified that the nausea and vomiting is very much related to regurgitation of undigested food back into your stomach. We think this is because of the anatomical disturbance that the Whipple procedure has caused and that is why her medications are not very helpful. Zofran still helps with the nausea but you always feel relieved only after vomiting.  -You are getting a pancreatic stent placed next week    4. We completed an AMD naming your wife as Nikko. 5.  Next steps  -Dr. Teddy Calhoun wants to wait on discussing reversal of the Whipple procedure for some more time. You want to hold off until the COVID 19 curve flattens in [place] before the elective surgery but convinced that the surgery will be the answer to your vomiting problem. This is what you have shared with us about Advance Care Planning:      Primary Decision Maker: Sanna Harris Spouse - [phone]    Secondary Decision Maker: Ridge Cristina III - Child - [phone]    Supplemental (Other) Decision Maker: Lalitha Henley Child - [phone]  Advance Care Planning 8/26/2020   Patient's [place] is: Named in scanned ACP document   Primary Decision Maker Name -   Primary Decision Maker Phone Number -   Primary Decision Maker Relationship to Patient -   Confirm Advance Directive Yes, on file   Patient Would Like to Complete Advance Directive -   Does the patient have other document types -           The Palliative Medicine Team is here to support you and your family.        Sincerely,      Suzette Santoyo MD and the Palliative Medicine Team       GOALS OF CARE / TREATMENT PREFERENCES:   [====Goals of Care====]  GOALS OF CARE:  Patient / health care proxy stated goals: See Patient Instructions / Summary    TREATMENT PREFERENCES:   Code Status:  [x] Attempt Resuscitation       [] Do Not Attempt Resuscitation    Advance Care Planning:  [x] The [facility] Interdisciplinary Team has updated the ACP Navigator with Decision Maker and Patient Capacity      Primary Decision MakerRupa Jacobs - [phone]    Secondary Decision Maker: Ridge Cristina [facility] - Child - [phone]    Supplemental (Other) Decision Maker: Misha Mcfarlane Child - [phone]  Advance Care Planning 2020   Patient's Healthcare Decision Maker is: Named in scanned ACP document   Primary Decision Maker Name -   Primary Decision Maker Phone Number -   Primary Decision Maker Relationship to Patient -   Confirm Advance Directive Yes, on file   Patient Would Like to Complete Advance Directive -   Does the patient have other document types -       Other:  (If patient appropriate for POST, consider using PALLPOST smart phrase here)    The palliative care team has discussed with patient / health care proxy about goals of care / treatment preferences for patient.  [====Goals of Care====]     PRESCRIPTIONS GIVEN:     Medications Ordered Today   Medications    fentaNYL 37.5 mcg/hour pt72     Si.5 mcg by TransDERmal route every seventy-two (72) hours for 30 days. Max Daily Amount: 37.5 mcg. Dispense:  10 Patch     Refill:  0           FOLLOW UP:     No future appointments. PHYSICIANS INVOLVED IN CARE:   Patient Care Team:  Cathy Partida DO as PCP - General (Internal Medicine)  Cathy Partida DO as PCP - [facility]  Davi Fernández MD (General Surgery)  Mariella Colon MD (Gastroenterology)  Genoveva Valdez MD (Gastroenterology)  Barbara Quigley MD (Hematology and Oncology)  Benny Hicks MD as Palliative Care (Palliative Medicine)       HISTORY:   Reviewed patient-completed ESAS and advance care planning form. Reviewed patient record in prescription monitoring program.    CHIEF COMPLAINT: No chief complaint on file. HPI/SUBJECTIVE:    The patient is: [x] Verbal / [] Nonverbal     Patient seen virtually at home. He has been struggling with increasing mid and lower abdominal/low back pain over the last 3 weeks. His need for tramadol had increased up to 4 tablets a day due to increasing pain.   His wife called us and we agreed to increase the fentanyl patch to 37.5 mcg. He has been using the increased dose for the past week and feeling much better. He is back down on the tramadol to 2 to 3 tablets/day.    -------    Patient here with his wife. Both are very good historians. Mid upper back pain-much improved since radiation to the periaortic mass. He struggled with pain for several months before it was identified as cancer recurrence. He was started on fentanyl 25 mcg patch which he wants to wean off of. He has made upper and mid zone abdominal pain which comes and goes. He has not noticed any specific pattern to the pain but usually the pain comes on before vomiting or relieves without vomiting. Sometimes, he vomits food that he ate about 12 to 14 hours ago. No constipation. He has 2 liquid bowel movements every day. He is unable to tolerate eggs or honey. He is getting tests done to evaluate his pancreatic enzymes. He has very good support through his wife.     Clinical Pain Assessment (nonverbal scale for nonverbal patients):   [++++ Clinical Pain Assessment++++]  [++++Pain Severity++++]: Pain: 7  [++++Pain Character++++]: cramping  [++++Pain Duration++++]: months  [++++Pain Effect++++]: functional   [++++Pain Factors++++]: none in particular  [++++Pain Frequency++++]: on and off  [++++Pain Location++++]: upper abdomen and mid back  [++++ Clinical Pain Assessment++++]       FUNCTIONAL ASSESSMENT:     Palliative Performance Scale (PPS):  PPS: 70       PSYCHOSOCIAL/SPIRITUAL SCREENING:     Any spiritual / Episcopalian concerns:  [] Yes /  [x] No    Caregiver Burnout:  [] Yes /  [x] No /  [] No Caregiver Present      Anticipatory grief assessment:   [x] Normal  / [] Maladaptive       ESAS Anxiety: Anxiety: 0    ESAS Depression: Depression: 0       REVIEW OF SYSTEMS:     The following systems were [x] reviewed / [] unable to be reviewed  Systems: constitutional, ears/nose/mouth/throat, respiratory, gastrointestinal, genitourinary, musculoskeletal, integumentary, neurologic, psychiatric, endocrine. Positive findings noted below. Modified ESAS Completed by: provider   Fatigue: 5 Drowsiness: 5   Depression: 0 Pain: 7   Anxiety: 0 Nausea: 10   Anorexia: 5 Dyspnea: 2   Best Well-Bein Constipation: No   Other Problem (Comment): 0          PHYSICAL EXAM:     Wt Readings from Last 3 Encounters:   20 150 lb (68 kg)   20 150 lb (68 kg)   20 146 lb (66.2 kg)     There were no vitals taken for this visit. Last bowel movement: See Nursing Note    Constitutional    [x] Appears well-developed and well-nourished in no apparent distress    [] Abnormal:  Mental status  [x] Alert and awake  [x] Oriented to person/place/time  [x] Able to follow commands  [] Abnormal:   Eyes  [x] EOM normal   [x] Sclera normal   [x] No visible ocular discharge  [] Abnormal:   HENT  [x] Normocephalic, atraumatic  [x] Mouth/Throat: Moist mucous membranes   [x] External Ears normal  [] Abnormal:  Neck  [x] No visualized mass  [] Abnormal:  Pulmonary/Chest   [x] Respiratory effort normal  [x] No visualized signs of difficulty breathing or respiratory distress  [] Abnormal:  Musculoskeletal  [x] Normal gait with no signs of ataxia  [x] Normal range of motion of neck  [] Abnormal:  Neurological:   [x] No facial asymmetry (Cranial nerve 7 motor function)  [x] No gaze palsy  [] Abnormal:   Skin  [x] No significant exanthematous lesions or discoloration noted on facial skin  [] Abnormal:                                  Psychiatric  [x] Normal affect  [x] No hallucinations  [] Abnormal:    Other pertinent observable physical exam findings:    Due to this being a TeleHealth evaluation, many elements of the physical examination are unable to be assessed. HISTORY:     Past Medical History:   Diagnosis Date    Arrhythmia     Previous a.fib, ablation, NSR now; NO LONGER FOLLOWED BY CARDIOLOGIST.     Autoimmune disease ([facility] [address]     [place] [facility]  Cancer ([facility] [address]     COMMON BILE DUCT, ADENOCARCINOMA    Diabetes ([facility] [address]     Family history of skin cancer     Hypertension     Sepsis ([facility] [address] 2017    STENTING TO BILE DUCT    Status post chemotherapy     Stroke [facility]) 2008    brain aneurysm - no deficits    Sun-damaged skin     Sunburn, blistering       Past Surgical History:   Procedure Laterality Date    HX GI      COLONOSCOPY, POLYPS (BENIGN)    HX GI  10/06/2017    [facility]     [address]  2005    Ablation     HX ORTHOPAEDIC  2000    Spinal fusion W/ HARDWARE    HX OTHER SURGICAL  11/07/2017    [place], Dr. Monet Allen  2017    PORTACATH - then removed    NEUROLOGICAL PROCEDURE UNLISTED  2005    Faustine Shelby hole washout from cerebral hemorrhage      Family History   Problem Relation Age of Onset    Cancer Father         Breast and Colon    Cancer Mother         LEUKEMIA    No Known Problems Sister     No Known Problems Brother     No Known Problems Sister     Anesth Problems Neg Hx       History reviewed, no pertinent family history. Social History     Tobacco Use    Smoking status: Never Smoker    Smokeless tobacco: Never Used   Substance Use Topics    Alcohol use: Never     Frequency: Never     Comment: very rare     Allergies   Allergen Reactions    Shellfish Derived Anaphylaxis     Soft shell crabs    Morphine Nausea and Vomiting    Pcn [Penicillins] Other (comments)     Pt stated \"always been told PCN\"  Pt tolerated other cephalosporins in the past      Current Outpatient Medications   Medication Sig    traMADoL (ULTRAM) 50 mg tablet Take 50 mg by mouth every six (6) hours as needed for Pain.  [START ON 9/18/2020] fentaNYL 37.5 mcg/hour pt72 37.5 mcg by TransDERmal route every seventy-two (72) hours for 30 days. Max Daily Amount: 37.5 mcg.  empagliflozin (Jardiance) 25 mg tablet Take 1 Tab by mouth daily.     cyanocobalamin (VITAMIN B12) 1,000 mcg/mL injection INJECT CONTENTS OF ONE VIAL INTRAMUSCULARLY EVERY OTHER WEEK    Creon 36,000-114,000- 180,000 unit cpDR capsule TAKE 2 3 CAPSULES WITH EACH MEAL AND 1 2 WITH SNACK    finasteride (PROSCAR) 5 mg tablet TAKE 1 TABLET BY MOUTH EVERY DAY    cholestyramine-aspartame (QUESTRAN LIGHT) 4 gram packet Take 4 g by mouth three (3) times daily. With meals    gabapentin (NEURONTIN) 100 mg capsule Take  by mouth two (2) times a day. Not sure of dose but takes 3 pills 2 times a day    ramipriL (ALTACE) 10 mg capsule TAKE 1 CAPSULE BY MOUTH EVERY DAY AT NIGHT    ondansetron (ZOFRAN ODT) 4 mg disintegrating tablet Take 1 Tab by mouth every eight (8) hours as needed for Nausea.  acetaminophen (TYLENOL) 325 mg tablet Take 2 Tabs by mouth every four (4) hours as needed for Pain or Fever (Over the counter medication).  sucralfate (CARAFATE) 1 gram tablet Take 1 g by mouth four (4) times daily.  omeprazole (PRILOSEC) 40 mg capsule Take 1 Cap by mouth daily.  sildenafil citrate (VIAGRA) 50 mg tablet Take 1 Tab by mouth as needed (ED).  tamsulosin (FLOMAX) 0.4 mg capsule TAKE ONE CAPSULE BY MOUTH EVERY EVENING    alpha-D-galactosidase (BEANO PO) Take 1 Tab by mouth two (2) times a day.  cetirizine (ZYRTEC) 10 mg tablet Take 10 mg by mouth nightly. No current facility-administered medications for this visit. LAB DATA REVIEWED:     Lab Results   Component Value Date/Time    WBC 6.1 01/02/2020 03:24 AM    HGB 13.4 01/02/2020 03:24 AM    PLATELET 200 (L) 28/93/2653 03:24 AM     Lab Results   Component Value Date/Time    Sodium 142 01/02/2020 03:24 AM    Potassium 3.4 (L) 01/02/2020 03:24 AM    Chloride 111 (H) 01/02/2020 03:24 AM    CO2 25 01/02/2020 03:24 AM    BUN 8 01/02/2020 03:24 AM    Creatinine 0.76 01/02/2020 03:24 AM    Calcium 8.5 01/02/2020 03:24 AM    Magnesium 2.4 12/31/2019 07:37 AM    Phosphorus 4.0 08/18/2018 04:20 AM      Lab Results   Component Value Date/Time    Alk.  phosphatase 106 01/02/2020 03:24 AM Protein, total 5.7 (L) 01/02/2020 03:24 AM    Albumin 3.0 (L) 01/02/2020 03:24 AM    Globulin 2.7 01/02/2020 03:24 AM     Lab Results   Component Value Date/Time    INR 1.0 12/04/2019 08:21 AM    Prothrombin time 10.6 12/04/2019 08:21 AM    aPTT 27.4 09/29/2017 09:20 AM      Lab Results   Component Value Date/Time    Iron 31 (L) 01/02/2020 03:24 AM    TIBC 245 (L) 01/02/2020 03:24 AM    Iron % saturation 13 (L) 01/02/2020 03:24 AM    Ferritin 122 01/02/2020 03:24 AM           CONTROLLED SUBSTANCES SAFETY ASSESSMENT (IF ON CONTROLLED SUBSTANCES):     Reviewed opioid safety handout:  [x] Yes   [] No  24 hour opioid dose >150mg morphine equivalent/day:  [] Yes   [x] No  Benzodiazepines:  [] Yes   [x] No  Sleep apnea:  [] Yes   [x] No  Urine Toxicology Testing within last 6 months:  [] Yes   [x] No  History of or new aberrant medication taking behaviors:  [] Yes   [x] No  Has Narcan been prescribed [x] Yes   [] No          Total time: 70 minutes  Counseling / coordination time:   > 50% counseling / coordination?:   Consent:  He and/or health care decision maker is aware that that he may receive a bill for this telehealth service, depending on his insurance coverage, and has provided verbal consent to proceed: Yes    CPT Codes 28105-78674 for Established Patients may apply to this Telehealth Visit  Pursuant to the emergency declaration under the [address] [place] [address], [facility] waiver authority and the [employer] and [employer] Act, this Virtual  Visit was conducted, with patient's consent, to reduce the patient's risk of exposure to COVID-19 and provide continuity of care for an established patient. Services were provided through a video synchronous discussion virtually to substitute for in-person clinic visit.

## 2020-08-26 NOTE — PROGRESS NOTES
Palliative Medicine Office Visit  Palliative Medicine Nurse Check In  [phone]    Patient Name: Debby Bae  YOB: 1955      Date of Office Visit: 8/26/2020    Patient states: \"  \"    From Check In Sheet (scanned in Media):  Has a medical provider talked with you about cardiopulmonary resuscitation (CPR)? [x] Yes   [] No   [] Unable to obtain    Nurse reminder to complete or update ACP FlowSheet:    Is ACP on the Problem List?    [] Yes    [] No  IF ACP Document is ON FILE; Nurse to place ACP on Problem List     Is there an ACP Note in Chart Review/Note? [] Yes    [] No   If NO: ALERT PROVIDER       Primary Decision MakerMaximiliano Ro - [phone]    Secondary Decision Maker: Ridge Cristina III - Child - [phone]    Supplemental (Other) Decision Maker: Hieu Alvarez Child - [phone]  Advance Care Planning 8/26/2020   Patient's [address] is: Named in scanned ACP document   Primary Decision Maker Name -   Primary Decision Maker Phone Number -   Primary Decision Maker Relationship to Patient -   Confirm Advance Directive Yes, on file   Patient Would Like to Complete Advance Directive -   Does the patient have other document types -         Is there anything that we should know about you as a person in order to provide you the best care possible? Have you been to the ER, urgent care clinic since your last visit? [] Yes   [x] No   [] Unable to obtain    Have you been hospitalized since your last visit? [] Yes   [x] No   [] Unable to obtain    Have you seen or consulted any other health care providers outside of the [address] [place] [address] since your last visit?    [] Yes   [x] No   [] Unable to obtain    Functional status (describe): r      Last BM: 8/26/2020     accessed (date): 8/26/2020    Bottle review (for opioid pain medication):  Medication 1: fentaNYL 37.5 mcg/hour pt72  Date filled: 8/19/2020  Directions: 37.5 mcg by TransDERmal route every seventy-two (72) hours  # filled: 10  # left:   # pills taking per day:37.5 mcg by TransDERmal route every seventy-two (72) hours  Last dose taken:    Medication 2: Tramadol 50 mg   Date filled: 8/1/2020  Directions: 1 tab by mouth every 6 hours   # filled: 60  # left:   # pills taking per day:  Last dose taken:

## 2020-08-28 DIAGNOSIS — G62.9 NEUROPATHY: Primary | ICD-10-CM

## 2020-08-28 RX ORDER — GABAPENTIN 100 MG/1
CAPSULE ORAL
Qty: 540 CAP | Refills: 3 | Status: SHIPPED | OUTPATIENT
Start: 2020-08-28 | End: 2020-10-20 | Stop reason: SDUPTHER

## 2020-08-31 ENCOUNTER — TELEPHONE (OUTPATIENT)
Dept: ONCOLOGY | Age: 65
End: 2020-08-31

## 2020-08-31 DIAGNOSIS — K85.90 RECURRENT PANCREATITIS: ICD-10-CM

## 2020-08-31 RX ORDER — TRAMADOL HYDROCHLORIDE 50 MG/1
50 TABLET ORAL
Qty: 60 TAB | Refills: 3 | Status: SHIPPED | OUTPATIENT
Start: 2020-08-31 | End: 2020-09-30

## 2020-08-31 NOTE — TELEPHONE ENCOUNTER
Triage for Controlled Substance Refill Request    Pain Diagnosis: _traMADoL [place]) 50 mg      Last Outpatient Visit: _8/26/2020    Next Outpatient Visit: _    Reason for refill needed outside of office visit?   -Appointment not scheduled prior to need for scheduled refill      Pharm[facility]: [facility] [address] (ULTRAM) 50 mg   Dose and directions:traMADoL (ULTRAM) 50 mg   Number dispensed:60   Date filled ( or Pharmacy):8/1/2020  # left:         reviewed: _yes     Date of Urine Drug Screen:  _n/a     Opioid Safety Handout given:  _es     Appropriate for refill:  _yes     Action:  _ please refill

## 2020-09-02 ENCOUNTER — APPOINTMENT (OUTPATIENT)
Dept: CT IMAGING | Age: 65
DRG: 423 | End: 2020-09-02
Attending: EMERGENCY MEDICINE
Payer: COMMERCIAL

## 2020-09-02 ENCOUNTER — HOSPITAL ENCOUNTER (INPATIENT)
Age: 65
LOS: 7 days | Discharge: HOME | DRG: 423 | End: 2020-09-11
Attending: EMERGENCY MEDICINE | Admitting: FAMILY MEDICINE
Payer: COMMERCIAL

## 2020-09-02 ENCOUNTER — APPOINTMENT (OUTPATIENT)
Dept: ULTRASOUND IMAGING | Age: 65
DRG: 423 | End: 2020-09-02
Attending: FAMILY MEDICINE
Payer: COMMERCIAL

## 2020-09-02 DIAGNOSIS — R10.9 BILATERAL FLANK PAIN: ICD-10-CM

## 2020-09-02 DIAGNOSIS — R10.10 PAIN OF UPPER ABDOMEN: ICD-10-CM

## 2020-09-02 DIAGNOSIS — M54.50 CHRONIC MIDLINE LOW BACK PAIN WITHOUT SCIATICA: ICD-10-CM

## 2020-09-02 DIAGNOSIS — G89.29 CHRONIC MIDLINE LOW BACK PAIN WITHOUT SCIATICA: ICD-10-CM

## 2020-09-02 DIAGNOSIS — R11.14 BILIOUS VOMITING WITH NAUSEA: ICD-10-CM

## 2020-09-02 DIAGNOSIS — R10.84 ABDOMINAL PAIN, GENERALIZED: ICD-10-CM

## 2020-09-02 DIAGNOSIS — R17 ELEVATED BILIRUBIN: ICD-10-CM

## 2020-09-02 DIAGNOSIS — C22.1 CHOLANGIOCARCINOMA OF BILIARY TRACT (HCC): ICD-10-CM

## 2020-09-02 DIAGNOSIS — I82.3 RENAL VEIN THROMBOSIS (HCC): ICD-10-CM

## 2020-09-02 DIAGNOSIS — R11.2 INTRACTABLE NAUSEA AND VOMITING: Primary | ICD-10-CM

## 2020-09-02 LAB
ALBUMIN SERPL-MCNC: 3.6 G/DL (ref 3.5–5)
ALBUMIN/GLOB SERPL: 1.2 {RATIO} (ref 1.1–2.2)
ALP SERPL-CCNC: 215 U/L (ref 45–117)
ALT SERPL-CCNC: 62 U/L (ref 12–78)
ANION GAP SERPL CALC-SCNC: 6 MMOL/L (ref 5–15)
AST SERPL-CCNC: 20 U/L (ref 15–37)
BASOPHILS # BLD: 0 K/UL (ref 0–0.1)
BASOPHILS NFR BLD: 0 % (ref 0–1)
BILIRUB SERPL-MCNC: 2.7 MG/DL (ref 0.2–1)
BUN SERPL-MCNC: 19 MG/DL (ref 6–20)
BUN/CREAT SERPL: 23 (ref 12–20)
CALCIUM SERPL-MCNC: 9 MG/DL (ref 8.5–10.1)
CHLORIDE SERPL-SCNC: 104 MMOL/L (ref 97–108)
CHOLEST SERPL-MCNC: 81 MG/DL
CO2 SERPL-SCNC: 27 MMOL/L (ref 21–32)
COMMENT, HOLDF: NORMAL
CREAT SERPL-MCNC: 0.82 MG/DL (ref 0.7–1.3)
DIFFERENTIAL METHOD BLD: ABNORMAL
EOSINOPHIL # BLD: 0 K/UL (ref 0–0.4)
EOSINOPHIL NFR BLD: 0 % (ref 0–7)
ERYTHROCYTE [DISTWIDTH] IN BLOOD BY AUTOMATED COUNT: 12.7 % (ref 11.5–14.5)
GLOBULIN SER CALC-MCNC: 2.9 G/DL (ref 2–4)
GLUCOSE SERPL-MCNC: 106 MG/DL (ref 65–100)
HCT VFR BLD AUTO: 43.4 % (ref 36.6–50.3)
HDLC SERPL-MCNC: 49 MG/DL
HDLC SERPL: 1.7 {RATIO} (ref 0–5)
HGB BLD-MCNC: 14.6 G/DL (ref 12.1–17)
IMM GRANULOCYTES # BLD AUTO: 0 K/UL (ref 0–0.04)
IMM GRANULOCYTES NFR BLD AUTO: 0 % (ref 0–0.5)
LDLC SERPL CALC-MCNC: 22.6 MG/DL (ref 0–100)
LIPASE SERPL-CCNC: 32 U/L (ref 73–393)
LIPID PROFILE,FLP: NORMAL
LYMPHOCYTES # BLD: 0.4 K/UL (ref 0.8–3.5)
LYMPHOCYTES NFR BLD: 4 % (ref 12–49)
MCH RBC QN AUTO: 31.9 PG (ref 26–34)
MCHC RBC AUTO-ENTMCNC: 33.6 G/DL (ref 30–36.5)
MCV RBC AUTO: 95 FL (ref 80–99)
MONOCYTES # BLD: 0.8 K/UL (ref 0–1)
MONOCYTES NFR BLD: 9 % (ref 5–13)
NEUTS SEG # BLD: 7.9 K/UL (ref 1.8–8)
NEUTS SEG NFR BLD: 87 % (ref 32–75)
NRBC # BLD: 0 K/UL (ref 0–0.01)
NRBC BLD-RTO: 0 PER 100 WBC
PLATELET # BLD AUTO: 107 K/UL (ref 150–400)
PMV BLD AUTO: 10.7 FL (ref 8.9–12.9)
POTASSIUM SERPL-SCNC: 3.9 MMOL/L (ref 3.5–5.1)
PROT SERPL-MCNC: 6.5 G/DL (ref 6.4–8.2)
RBC # BLD AUTO: 4.57 M/UL (ref 4.1–5.7)
RBC MORPH BLD: ABNORMAL
SAMPLES BEING HELD,HOLD: NORMAL
SODIUM SERPL-SCNC: 137 MMOL/L (ref 136–145)
TRIGL SERPL-MCNC: 47 MG/DL (ref ?–150)
VLDLC SERPL CALC-MCNC: 9.4 MG/DL
WBC # BLD AUTO: 9.1 K/UL (ref 4.1–11.1)

## 2020-09-02 PROCEDURE — 96366 THER/PROPH/DIAG IV INF ADDON: CPT

## 2020-09-02 PROCEDURE — 96376 TX/PRO/DX INJ SAME DRUG ADON: CPT

## 2020-09-02 PROCEDURE — 74011000636 HC RX REV CODE- 636: Performed by: RADIOLOGY

## 2020-09-02 PROCEDURE — 74011250636 HC RX REV CODE- 250/636: Performed by: FAMILY MEDICINE

## 2020-09-02 PROCEDURE — 99218 HC RM OBSERVATION: CPT

## 2020-09-02 PROCEDURE — 74011250636 HC RX REV CODE- 250/636: Performed by: EMERGENCY MEDICINE

## 2020-09-02 PROCEDURE — 96372 THER/PROPH/DIAG INJ SC/IM: CPT

## 2020-09-02 PROCEDURE — 83690 ASSAY OF LIPASE: CPT

## 2020-09-02 PROCEDURE — 80053 COMPREHEN METABOLIC PANEL: CPT

## 2020-09-02 PROCEDURE — 74011000258 HC RX REV CODE- 258: Performed by: RADIOLOGY

## 2020-09-02 PROCEDURE — 80061 LIPID PANEL: CPT

## 2020-09-02 PROCEDURE — 96367 TX/PROPH/DG ADDL SEQ IV INF: CPT

## 2020-09-02 PROCEDURE — 76705 ECHO EXAM OF ABDOMEN: CPT

## 2020-09-02 PROCEDURE — 74011000250 HC RX REV CODE- 250: Performed by: FAMILY MEDICINE

## 2020-09-02 PROCEDURE — 96365 THER/PROPH/DIAG IV INF INIT: CPT

## 2020-09-02 PROCEDURE — 36415 COLL VENOUS BLD VENIPUNCTURE: CPT

## 2020-09-02 PROCEDURE — 85025 COMPLETE CBC W/AUTO DIFF WBC: CPT

## 2020-09-02 PROCEDURE — 96375 TX/PRO/DX INJ NEW DRUG ADDON: CPT

## 2020-09-02 PROCEDURE — C9113 INJ PANTOPRAZOLE SODIUM, VIA: HCPCS | Performed by: FAMILY MEDICINE

## 2020-09-02 PROCEDURE — 74177 CT ABD & PELVIS W/CONTRAST: CPT

## 2020-09-02 PROCEDURE — 99284 EMERGENCY DEPT VISIT MOD MDM: CPT

## 2020-09-02 RX ORDER — SODIUM CHLORIDE 0.9 % (FLUSH) 0.9 %
5-40 SYRINGE (ML) INJECTION AS NEEDED
Status: DISCONTINUED | OUTPATIENT
Start: 2020-09-02 | End: 2020-09-11 | Stop reason: HOSPADM

## 2020-09-02 RX ORDER — DUTASTERIDE 0.5 MG/1
0.5 CAPSULE, LIQUID FILLED ORAL DAILY
Status: DISCONTINUED | OUTPATIENT
Start: 2020-09-03 | End: 2020-09-11 | Stop reason: HOSPADM

## 2020-09-02 RX ORDER — PANCRELIPASE 36000; 180000; 114000 [USP'U]/1; [USP'U]/1; [USP'U]/1
1 CAPSULE, DELAYED RELEASE PELLETS ORAL AS NEEDED
COMMUNITY
End: 2021-01-01

## 2020-09-02 RX ORDER — SODIUM CHLORIDE, SODIUM LACTATE, POTASSIUM CHLORIDE, CALCIUM CHLORIDE 600; 310; 30; 20 MG/100ML; MG/100ML; MG/100ML; MG/100ML
100 INJECTION, SOLUTION INTRAVENOUS CONTINUOUS
Status: DISCONTINUED | OUTPATIENT
Start: 2020-09-02 | End: 2020-09-04

## 2020-09-02 RX ORDER — LOPERAMIDE HYDROCHLORIDE 2 MG/1
2-4 CAPSULE ORAL AS NEEDED
COMMUNITY
End: 2022-01-01

## 2020-09-02 RX ORDER — HEPARIN SODIUM 5000 [USP'U]/ML
5000 INJECTION, SOLUTION INTRAVENOUS; SUBCUTANEOUS EVERY 8 HOURS
Status: DISCONTINUED | OUTPATIENT
Start: 2020-09-02 | End: 2020-09-04

## 2020-09-02 RX ORDER — HYDROMORPHONE HYDROCHLORIDE 1 MG/ML
1 INJECTION, SOLUTION INTRAMUSCULAR; INTRAVENOUS; SUBCUTANEOUS
Status: DISCONTINUED | OUTPATIENT
Start: 2020-09-02 | End: 2020-09-03

## 2020-09-02 RX ORDER — TAMSULOSIN HYDROCHLORIDE 0.4 MG/1
0.4 CAPSULE ORAL DAILY
Status: DISCONTINUED | OUTPATIENT
Start: 2020-09-03 | End: 2020-09-11 | Stop reason: HOSPADM

## 2020-09-02 RX ORDER — FINASTERIDE 5 MG/1
5 TABLET, FILM COATED ORAL DAILY
Status: DISCONTINUED | OUTPATIENT
Start: 2020-09-03 | End: 2020-09-11 | Stop reason: HOSPADM

## 2020-09-02 RX ORDER — SODIUM CHLORIDE 0.9 % (FLUSH) 0.9 %
10 SYRINGE (ML) INJECTION
Status: COMPLETED | OUTPATIENT
Start: 2020-09-02 | End: 2020-09-02

## 2020-09-02 RX ORDER — CHOLESTYRAMINE 4 G/4.8G
4 POWDER, FOR SUSPENSION ORAL 3 TIMES DAILY
Status: CANCELLED | OUTPATIENT
Start: 2020-09-02

## 2020-09-02 RX ORDER — SODIUM CHLORIDE 0.9 % (FLUSH) 0.9 %
5-40 SYRINGE (ML) INJECTION EVERY 8 HOURS
Status: DISCONTINUED | OUTPATIENT
Start: 2020-09-02 | End: 2020-09-11 | Stop reason: HOSPADM

## 2020-09-02 RX ORDER — ONDANSETRON 2 MG/ML
4 INJECTION INTRAMUSCULAR; INTRAVENOUS
Status: DISCONTINUED | OUTPATIENT
Start: 2020-09-02 | End: 2020-09-11 | Stop reason: HOSPADM

## 2020-09-02 RX ORDER — FENTANYL CITRATE 50 UG/ML
25 INJECTION, SOLUTION INTRAMUSCULAR; INTRAVENOUS
Status: COMPLETED | OUTPATIENT
Start: 2020-09-02 | End: 2020-09-02

## 2020-09-02 RX ORDER — FINASTERIDE 5 MG/1
5 TABLET, FILM COATED ORAL DAILY
Status: CANCELLED | OUTPATIENT
Start: 2020-09-03

## 2020-09-02 RX ORDER — TAMSULOSIN HYDROCHLORIDE 0.4 MG/1
0.4 CAPSULE ORAL DAILY
Status: CANCELLED | OUTPATIENT
Start: 2020-09-03

## 2020-09-02 RX ORDER — DUTASTERIDE 0.5 MG/1
0.5 CAPSULE, LIQUID FILLED ORAL DAILY
COMMUNITY
End: 2020-11-18 | Stop reason: SDUPTHER

## 2020-09-02 RX ADMIN — HYDROMORPHONE HYDROCHLORIDE 1 MG: 1 INJECTION, SOLUTION INTRAMUSCULAR; INTRAVENOUS; SUBCUTANEOUS at 16:30

## 2020-09-02 RX ADMIN — FENTANYL CITRATE 25 MCG: 50 INJECTION, SOLUTION INTRAMUSCULAR; INTRAVENOUS at 08:43

## 2020-09-02 RX ADMIN — SODIUM CHLORIDE, SODIUM LACTATE, POTASSIUM CHLORIDE, AND CALCIUM CHLORIDE 100 ML/HR: 600; 310; 30; 20 INJECTION, SOLUTION INTRAVENOUS at 16:30

## 2020-09-02 RX ADMIN — SODIUM CHLORIDE 100 ML: 900 INJECTION, SOLUTION INTRAVENOUS at 10:24

## 2020-09-02 RX ADMIN — Medication 10 ML: at 20:55

## 2020-09-02 RX ADMIN — ONDANSETRON 4 MG: 2 INJECTION INTRAMUSCULAR; INTRAVENOUS at 15:41

## 2020-09-02 RX ADMIN — SODIUM CHLORIDE 1000 ML: 9 INJECTION, SOLUTION INTRAVENOUS at 08:43

## 2020-09-02 RX ADMIN — IOPAMIDOL 100 ML: 755 INJECTION, SOLUTION INTRAVENOUS at 10:24

## 2020-09-02 RX ADMIN — Medication 10 ML: at 10:24

## 2020-09-02 RX ADMIN — ONDANSETRON 4 MG: 2 INJECTION INTRAMUSCULAR; INTRAVENOUS at 20:54

## 2020-09-02 RX ADMIN — PROMETHAZINE HYDROCHLORIDE 25 MG: 25 INJECTION INTRAMUSCULAR; INTRAVENOUS at 11:12

## 2020-09-02 RX ADMIN — PROMETHAZINE HYDROCHLORIDE 25 MG: 25 INJECTION INTRAMUSCULAR; INTRAVENOUS at 08:43

## 2020-09-02 RX ADMIN — HEPARIN SODIUM 5000 UNITS: 5000 INJECTION INTRAVENOUS; SUBCUTANEOUS at 20:55

## 2020-09-02 RX ADMIN — HYDROMORPHONE HYDROCHLORIDE 1 MG: 1 INJECTION, SOLUTION INTRAMUSCULAR; INTRAVENOUS; SUBCUTANEOUS at 20:54

## 2020-09-02 RX ADMIN — SODIUM CHLORIDE 40 MG: 9 INJECTION INTRAMUSCULAR; INTRAVENOUS; SUBCUTANEOUS at 15:41

## 2020-09-02 NOTE — ED TRIAGE NOTES
Patient arrives ambulatory from home with CC of mid abdominal pain and vomiting. Endorses 5 episodes of vomiting tonight +diarrhea. Patient reports a history of pancreatitis and states the pain is the same. Pt is followed by Dr. Erica La. Hx of Bile duct cancer. Pt took tramadol and percocet at home without relief. Denies chest pain, shortness of breath and fever.

## 2020-09-02 NOTE — CONSULTS
[address] [facility].  [address] [facility] [address]han, [address]  [phone]                     GI CONSULTATION NOTE      NAME:  Rajan Anton. :   1955   MRN:   [id number]     Consult Date: 2020     Chief Complaint: Abd pain, N/V    History of Present Illness:  Patient is a 62 y.o. who is seen in consultation at the request of Dr. Nicanor Degroot for the above problem. He has a history of extrahepatic cholangiocarcinoma status post pancreaticoduodenectomy (Whipple) in 2017 followed by chemotherapy, disease-free for 2.5 years with recent recurrence of disease in para-aortic soft tissue status post RT, history of A. 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 His oncologist is by Dr. Jonathan Mistry, who referred patient to Palliative Medicine for management of symptoms and psychosocial support, with visit done on . He is known to Dr. Joycelyn Martinez for chronic pancreatic ductal stenosis that had been stented from 2020, with stent replacement in 2020, and attempted stent change in 2020. However, on that procedure, the stent was removed but the guidewire slipped from the pancreatic duct and another stent was not able to be placed. Patient reports that he had been doing well until about 6-7 days ago when he started to have abdominal pain. The pain is described as epigastric with radiation into the back, but also lower abdomen. Associated symptoms include nausea and vomiting, and he has had some loose dark-looking stool. He denies hematemesis, coffee-ground emesis, NSAID use, tobacco use, ETOH use. Upon presentation, he was found to have normal CBC and lipase. Alk phos is elevated at 215 and TBili at 2.7, otherwise LFTs are normal.  CT with IV contrast shows fatty liver, paraceliac soft tissue abnormality and inflammatory changes in the left anterior pararenal space. These findings are unchanged compared to the prior CT in May 2020.       PMH:  Past Medical History: Diagnosis Date    Arrhythmia     Previous a.fib, ablation, NSR now; NO LONGER FOLLOWED BY CARDIOLOGIST.  Autoimmune disease ([facility]     SJOGREN'S    Cancer ([facility] [address]     COMMON BILE DUCT, ADENOCARCINOMA    Diabetes ([facility] [address]     Family history of skin cancer     Hypertension     Sepsis ([facility] [address] 2017    STENTING TO BILE DUCT    Status post chemotherapy     Stroke [facility]) 2008    brain aneurysm - no deficits    Sun-damaged skin     Sunburn, blistering        PSH:  Past Surgical History:   Procedure Laterality Date    HX GI      COLONOSCOPY, POLYPS (BENIGN)    HX GI  10/06/2017    Viktor Du[facility]     [address]  2005    Ablation     HX ORTHOPAEDIC  2000    Spinal fusion W/ HARDWARE    HX OTHER SURGICAL  11/07/2017    [place] Cath, Dr. Maria Elena Muhammad  2017    PORTACATH - then removed    NEUROLOGICAL PROCEDURE UNLISTED  2005    Romeo Dorene hole washout from cerebral hemorrhage       Allergies: Allergies   Allergen Reactions    Shellfish Derived Anaphylaxis     Soft shell crabs    Morphine Nausea and Vomiting    Pcn [Penicillins] Other (comments)     Pt stated \"always been told PCN\"  Pt tolerated other cephalosporins in the past       Home Medications:  Prior to Admission Medications   Prescriptions Last Dose Informant Patient Reported? Taking? Creon 36,000-114,000- 180,000 unit cpDR capsule   Yes No   Sig: TAKE 2 3 CAPSULES WITH EACH MEAL AND 1 2 WITH SNACK   acetaminophen (TYLENOL) 325 mg tablet   No No   Sig: Take 2 Tabs by mouth every four (4) hours as needed for Pain or Fever (Over the counter medication). alpha-D-galactosidase (BEANO PO)  Self Yes No   Sig: Take 1 Tab by mouth two (2) times a day. cetirizine (ZYRTEC) 10 mg tablet  Self Yes No   Sig: Take 10 mg by mouth nightly. cholestyramine-aspartame (QUESTRAN LIGHT) 4 gram packet   Yes No   Sig: Take 4 g by mouth three (3) times daily.  With meals   cyanocobalamin (VITAMIN B12) 1,000 mcg/mL injection   No No   Sig: INJECT CONTENTS OF ONE VIAL INTRAMUSCULARLY EVERY OTHER WEEK   empagliflozin (Jardiance) 25 mg tablet   No No   Sig: Take 1 Tab by mouth daily. fentaNYL 37.5 mcg/hour pt72   No No   Si.5 mcg by TransDERmal route every seventy-two (72) hours for 30 days. Max Daily Amount: 37.5 mcg. finasteride (PROSCAR) 5 mg tablet   Yes No   Sig: TAKE 1 TABLET BY MOUTH EVERY DAY   gabapentin (NEURONTIN) 100 mg capsule   No No   Sig: Take 3 capsules by mouth twice a day. omeprazole (PRILOSEC) 40 mg capsule   No No   Sig: Take 1 Cap by mouth daily. ondansetron (ZOFRAN ODT) 4 mg disintegrating tablet   No No   Sig: Take 1 Tab by mouth every eight (8) hours as needed for Nausea. ramipriL (ALTACE) 10 mg capsule   No No   Sig: TAKE 1 CAPSULE BY MOUTH EVERY DAY AT NIGHT   sildenafil citrate (VIAGRA) 50 mg tablet   No No   Sig: Take 1 Tab by mouth as needed (ED). sucralfate (CARAFATE) 1 gram tablet   Yes No   Sig: Take 1 g by mouth four (4) times daily. tamsulosin (FLOMAX) 0.4 mg capsule   Yes No   Sig: TAKE ONE CAPSULE BY MOUTH EVERY EVENING   traMADoL (ULTRAM) 50 mg tablet   Yes No   Sig: Take 50 mg by mouth every six (6) hours as needed for Pain.   traMADoL (ULTRAM) 50 mg tablet   No No   Sig: Take 1 Tab by mouth every six (6) hours as needed for Pain for up to 30 days. Max Daily Amount: 200 mg. Facility-Administered Medications: None       Hospital Medications:  No current facility-administered medications for this encounter. Current Outpatient Medications   Medication Sig    traMADoL (ULTRAM) 50 mg tablet Take 1 Tab by mouth every six (6) hours as needed for Pain for up to 30 days. Max Daily Amount: 200 mg.    gabapentin (NEURONTIN) 100 mg capsule Take 3 capsules by mouth twice a day.  traMADoL (ULTRAM) 50 mg tablet Take 50 mg by mouth every six (6) hours as needed for Pain.  [START ON 2020] fentaNYL 37.5 mcg/hour pt72 37.5 mcg by TransDERmal route every seventy-two (72) hours for 30 days.  Max Daily Amount: 37.5 mcg.  empagliflozin (Jardiance) 25 mg tablet Take 1 Tab by mouth daily.  cyanocobalamin (VITAMIN B12) 1,000 mcg/mL injection INJECT CONTENTS OF ONE VIAL INTRAMUSCULARLY EVERY OTHER WEEK    Creon 36,000-114,000- 180,000 unit cpDR capsule TAKE 2 3 CAPSULES WITH EACH MEAL AND 1 2 WITH SNACK    finasteride (PROSCAR) 5 mg tablet TAKE 1 TABLET BY MOUTH EVERY DAY    cholestyramine-aspartame (QUESTRAN LIGHT) 4 gram packet Take 4 g by mouth three (3) times daily. With meals    ramipriL (ALTACE) 10 mg capsule TAKE 1 CAPSULE BY MOUTH EVERY DAY AT NIGHT    ondansetron (ZOFRAN ODT) 4 mg disintegrating tablet Take 1 Tab by mouth every eight (8) hours as needed for Nausea.  acetaminophen (TYLENOL) 325 mg tablet Take 2 Tabs by mouth every four (4) hours as needed for Pain or Fever (Over the counter medication).  sucralfate (CARAFATE) 1 gram tablet Take 1 g by mouth four (4) times daily.  omeprazole (PRILOSEC) 40 mg capsule Take 1 Cap by mouth daily.  sildenafil citrate (VIAGRA) 50 mg tablet Take 1 Tab by mouth as needed (ED).  tamsulosin (FLOMAX) 0.4 mg capsule TAKE ONE CAPSULE BY MOUTH EVERY EVENING    alpha-D-galactosidase (BEANO PO) Take 1 Tab by mouth two (2) times a day.  cetirizine (ZYRTEC) 10 mg tablet Take 10 mg by mouth nightly.        Social History:  Social History     Tobacco Use    Smoking status: Never Smoker    Smokeless tobacco: Never Used   Substance Use Topics    Alcohol use: Never     Frequency: Never     Comment: very rare       Family History:  Family History   Problem Relation Age of Onset    Cancer Father         Breast and Colon    Cancer Mother         LEUKEMIA    No Known Problems Sister     No Known Problems Brother     No Known Problems Sister     Anesth Problems Neg Hx        Review of Systems:    Constitutional: negative fever, negative chills, negative weight loss  Eyes:   negative visual changes  ENT:   negative sore throat, tongue or lip swelling  Respiratory:  negative cough, negative dyspnea  Cards:  negative for chest pain, palpitations, lower extremity edema  GI:   See HPI  :  negative for frequency, dysuria  Integument:  negative for rash and pruritus  Heme:  negative for easy bruising and gum/nose bleeding  Musculoskel: negative for myalgias,  back pain and muscle weakness  Neuro: negative for headaches, dizziness, vertigo  Psych:  negative for feelings of anxiety, depression      Objective:     Patient Vitals for the past 8 hrs:   BP Temp Pulse Resp SpO2   09/02/20 1130 109/69  95 16 95 %   09/02/20 1030 108/66    98 %   09/02/20 1020 117/68  85 16 98 %   09/02/20 0900 113/62  83 16 96 %   09/02/20 0721 111/67 98.4 °F (36.9 °C) 90 18 97 %     No intake/output data recorded. No intake/output data recorded. PHYSICAL EXAM:  General appearance: cooperative, no distress, appears uncomfortable curled in bed with emesis bag  Skin: Extremities and face reveal no rashes, jaundice or pallor  HEENT: Sclerae anicteric. Extra-occular muscles are intact. Cardiovascular: Regular rate and rhythm. No murmurs, gallops, or rubs. Respiratory: Comfortable breathing with no accessory muscle use. Clear breath sounds with no wheezes, rales, or rhonchi. GI: Abdomen nondistended, soft, mod ttp epigastrium  Rectal: Deferred   Musculoskeletal: No edema of the lower legs. Neurological: Gross memory appears intact. Patient is alert and oriented. Psychiatric: Mood appears appropriate with good judgement. No anxiety or agitation. Data Review     Recent Labs     09/02/20  0754   WBC 9.1   HGB 14.6   HCT 43.4   *     Recent Labs     09/02/20  0754      K 3.9      CO2 27   BUN 19   CREA 0.82   *   CA 9.0     Recent Labs     09/02/20  0754   *   TP 6.5   ALB 3.6   GLOB 2.9   LPSE 32*     No results for input(s): INR, PTP, APTT, INREXT in the last 72 hours. Imaging studies reviewed:     PET 6/2020:  1.  Persistent para-aortic tumor with minimal interval increased SUV.  2. Otherwise stable/negative PET. CT 9/2/20:  Mild left hydronephrosis and delayed nephrogram of uncertain etiology as a  ureteral stone is not visualized. Status post Whipple. Paraceliac soft tissue  abnormality is unchanged compared to the prior exam. Inflammatory changes in the  left anterior pararenal space unchanged. Assessment / Plan :     Abdominal pain, N/V:  Chronic pancreatic duct stricture previously treated with stenting  - Stent removed 7/2020, but unable to replace  - ALP and TBili increased, otherwise normal LFTs  - Lipase normal - Previous triglyceride and IgG4 normal  - Will discuss with Dr. Jeremy Starr    Extrahepatic cholangiocarcinoma: diagnosed 2017, treated with pylorus-preserving Whipple and chemo  - T3 N1 (1 of 12 LN +ve);  Invasion into pancreas; R0 resection  - CT guided biopsy 2/3/2020 of paraaortic soft tissue mass - positive for adenocarcinoma  - PET scan 6/2020 showed increased size  - Follows with Dr. Lorenzo Roberts and has been seen by Palliative OP     Patient Active Hospital Problem List:   Active Problems:    Nausea & vomiting (9/2/2020)

## 2020-09-02 NOTE — PROGRESS NOTES
Admission Medication Reconciliation:    Information obtained from:  Patient  RxQuery data available¹:  YES    Comments/Recommendations: Updated PTA meds/reviewed patient's allergies. 1)  Patient is an adequate historian.     - BPH - patient states he is on both finasteride and dutasteride in addition to Flomax. Patient convinced this is his proper regimen, however it would be unusual to see two 5 Alpha-reductase inhibitors being used concurrently. It appears per insurance pharmacy fills that dutasteride is the most newly prescribed agent (8/18; finasteride filled 7/09). - Fentanyl patch - patient due for replacement Today. Patient also on supplemental tramadol. He reports usually he takes 1-2 tabs daily, however this week has had to take 3 daily. 2)  Medication changes (since last review): Added  - Dutasteride (see above)  - Loperamide    Adjusted  - Creon 29772 to 2 orders:  2-3 caps TIDCC and 1 cap PRN snacks   - Januvia to nighttime administration    Removed  - Cholestyramine packets 4g TIDCC- per patient due to their interference with loperamide. ¹RxQuery pharmacy benefit data reflects medications filled and processed through the patient's insurance, however   this data does NOT capture whether the medication was picked up or is currently being taken by the patient. Allergies:  Shellfish derived; Morphine; and Pcn [penicillins]    Significant PMH/Disease States:   Past Medical History:   Diagnosis Date    Arrhythmia     Previous a.fib, ablation, NSR now; NO LONGER FOLLOWED BY CARDIOLOGIST.     Autoimmune disease ([address]     SJOGREN'S    Cancer ([facility] [address]     COMMON BILE DUCT, ADENOCARCINOMA    Diabetes ([address]     Family history of skin cancer     Hypertension     Sepsis ([facility] [address] 2017    STENTING TO BILE DUCT    Status post chemotherapy     Stroke [facility]) 2008    brain aneurysm - no deficits    Sun-damaged skin     Sunburn, blistering      Chief Complaint for this Admission:    Chief Complaint Patient presents with    Abdominal Pain    Vomiting     Prior to Admission Medications:   Prior to Admission Medications   Prescriptions Last Dose Informant Taking? Creon 36,000-114,000- 180,000 unit cpDR capsule 2020 at Unknown time  Yes   Sig: Take 2-3 Caps by mouth three (3) times daily (with meals). 2-3 caps each meal and 1 cap for snacks (see additional order)   acetaminophen (TYLENOL) 325 mg tablet 2020 at Unknown time  Yes   Sig: Take 2 Tabs by mouth every four (4) hours as needed for Pain or Fever (Over the counter medication). alpha-D-galactosidase (BEANO PO) 2020 at Unknown time Self Yes   Sig: Take 1 Tab by mouth two (2) times a day. cetirizine (ZYRTEC) 10 mg tablet 2020 at Unknown time Self Yes   Sig: Take 10 mg by mouth nightly. cyanocobalamin (VITAMIN B12) 1,000 mcg/mL injection 2020  Yes   Sig: INJECT CONTENTS OF ONE VIAL INTRAMUSCULARLY EVERY OTHER WEEK   dutasteride (AVODART) 0.5 mg capsule 2020 at AM  Yes   Sig: Take 0.5 mg by mouth daily. empagliflozin (Jardiance) 25 mg tablet 2020 at Unknown time  Yes   Sig: Take 1 Tab by mouth every night. fentaNYL 37.5 mcg/hour pt72 2020 at Unknown time  Yes   Si.5 mcg by TransDERmal route every seventy-two (72) hours for 30 days. Max Daily Amount: 37.5 mcg. finasteride (PROSCAR) 5 mg tablet 2020 at AM  Yes   Sig: TAKE 1 TABLET BY MOUTH EVERY DAY   gabapentin (NEURONTIN) 100 mg capsule 2020 at Unknown time  Yes   Sig: Take 3 capsules by mouth twice a day. lipase-protease-amylase (Creon) 36,000-114,000- 180,000 unit cpDR capsule 2020 at Unknown time  Yes   Sig: Take 1 Cap by mouth as needed (for snacks). 2-3 caps each meal and 1 cap for snacks (see additional order)   loperamide (IMODIUM) 2 mg capsule   Yes   Sig: Take 2-4 mg by mouth as needed for Diarrhea. Give 4 mg after first loose stool. Give an additional 2 mg after each loose stool as needed up to a maximum of 8 doses (16 mg/day). omeprazole (PRILOSEC) 40 mg capsule 9/1/2020 at Unknown time  Yes   Sig: Take 1 Cap by mouth daily. ondansetron (ZOFRAN ODT) 4 mg disintegrating tablet 9/2/2020 at Unknown time  Yes   Sig: Take 1 Tab by mouth every eight (8) hours as needed for Nausea. ramipriL (ALTACE) 10 mg capsule 9/1/2020 at Unknown time  Yes   Sig: TAKE 1 CAPSULE BY MOUTH EVERY DAY AT NIGHT   sildenafil citrate (VIAGRA) 50 mg tablet   Yes   Sig: Take 1 Tab by mouth as needed (ED). sucralfate (CARAFATE) 1 gram tablet 9/1/2020 at Unknown time  Yes   Sig: Take 1 g by mouth four (4) times daily. tamsulosin (FLOMAX) 0.4 mg capsule 9/1/2020 at Unknown time  Yes   Sig: TAKE ONE CAPSULE BY MOUTH EVERY EVENING   traMADoL (ULTRAM) 50 mg tablet 9/2/2020 at Unknown time  Yes   Sig: Take 1 Tab by mouth every six (6) hours as needed for Pain for up to 30 days. Max Daily Amount: 200 mg. Facility-Administered Medications: None       Please contact the main inpatient pharmacy with any questions or concerns at [phone] and we will direct you to the clinical pharmacist covering this patient's care while in-house.    Linda Olvera, [place]ZEKI[place]

## 2020-09-02 NOTE — H&P
History & Physical    Primary Care Provider: Moy Veloz DO  Source of Information: Patient     History of Presenting Illness:   Luis Zavala is a 62 y.o. male who presents with abdominal pain. History was primary obtained from the patient    Per chart review, patient with history of extrahepatic cholangiocarcinoma status post pancreaticoduodenectomy in 2017 followed by chemotherapy, disease-free for 2.5 years, recent recurrence of disease in para-aortic soft tissue status post RT, history of A. 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 .    Patient reports that he started experiencing abdominal pain associated with nausea, vomiting that started about 6 days back. Patient reports that for the past few days the pain has been getting worse, he has been having increased nausea, multiple bouts of vomiting, poor appetite, not able to tolerate p.o. Patient reports that he thinks he has another bout of pancreatitis. Patient reports that he has been taking oxycodone, tramadol, fentanyl patch and antiemetics at home but they have not been working. Patient reports that today he got concerned because he was not able to tolerate p.o., his epigastric pain persisted and he decided to come to the hospital patient was found to have mildly elevated bilirubin and was requested to be admitted under the hospitalist service. Patient denies any other complaints or problems      The patient denies any Headache, blurry vision, sore throat, trouble swallowing, trouble with speech, chest pain, SOB, cough, fever, chills, urinary symptoms, constipation, recent travels, sick contacts, focal or generalized oneirological symptoms,  falls, injuries, rashes, contact with COVID-19 diagnosed patients, hematemesis, melena, hemoptysis, hematuria, rashes, denies starting any new medications and denies any other concerns or problems besides as mentioned above. Review of Systems:  A comprehensive review of systems was negative except for that written in the History of Present Illness. Past Medical History:   Diagnosis Date    Arrhythmia     Previous a.fib, ablation, NSR now; NO LONGER FOLLOWED BY CARDIOLOGIST.  Autoimmune disease ([facility]     SJOGREN'S    Cancer ([facility] [facility]     COMMON BILE DUCT, ADENOCARCINOMA    Diabetes ([facility]     Family history of skin cancer     Hypertension     Sepsis ([facility] [facility] 2017    STENTING TO BILE DUCT    Status post chemotherapy     Stroke [facility]) 2008    brain aneurysm - no deficits    Sun-damaged skin     Sunburn, blistering       Past Surgical History:   Procedure Laterality Date    HX GI      COLONOSCOPY, POLYPS (BENIGN)    [facility]     [address]  2005    Ablation     HX ORTHOPAEDIC  2000    Spinal fusion W/ HARDWARE    HX OTHER SURGICAL  11/07/2017    [place] [facility], Dr. Naveed Archer  2017    PORTACATH - then removed    NEUROLOGICAL PROCEDURE UNLISTED  2005    [place] hole washout from cerebral hemorrhage     Prior to Admission medications    Medication Sig Start Date End Date Taking? Authorizing Provider   traMADoL (ULTRAM) 50 mg tablet Take 1 Tab by mouth every six (6) hours as needed for Pain for up to 30 days. Max Daily Amount: 200 mg. 8/31/20 9/30/20  Keysha Bridges MD   gabapentin (NEURONTIN) 100 mg capsule Take 3 capsules by mouth twice a day. 8/28/20   Mary Raya III, DO   traMADoL (ULTRAM) 50 mg tablet Take 50 mg by mouth every six (6) hours as needed for Pain. Provider, Historical   fentaNYL 37.5 mcg/hour pt72 37.5 mcg by TransDERmal route every seventy-two (72) hours for 30 days. Max Daily Amount: 37.5 mcg. 9/18/20 10/18/20  Keysha Bridges MD   empagliflozin (Jardiance) 25 mg tablet Take 1 Tab by mouth daily.  8/17/20   Silas Raya III, DO   cyanocobalamin (VITAMIN B12) 1,000 mcg/mL injection INJECT CONTENTS OF ONE VIAL INTRAMUSCULARLY EVERY OTHER WEEK 7/15/20   Willi OGDEN III, DO   Creon 36,000-114,000- 180,000 unit cpDR capsule TAKE 2 3 CAPSULES WITH EACH MEAL AND 1 2 WITH [facility] 4/10/20   Provider, Historical   finasteride (PROSCAR) 5 mg tablet TAKE 1 TABLET BY MOUTH EVERY DAY 4/13/20   Provider, Historical   cholestyramine-aspartame (QUESTRAN LIGHT) 4 gram packet Take 4 g by mouth three (3) times daily. With meals    Provider, Historical   ramipriL (ALTACE) 10 mg capsule TAKE 1 CAPSULE BY MOUTH EVERY DAY AT NIGHT 3/29/20   Willi OGDEN III, DO   ondansetron (ZOFRAN ODT) 4 mg disintegrating tablet Take 1 Tab by mouth every eight (8) hours as needed for Nausea. 2/24/20   Willi OGDEN III, DO   acetaminophen (TYLENOL) 325 mg tablet Take 2 Tabs by mouth every four (4) hours as needed for Pain or Fever (Over the counter medication). 1/2/20   Elsy Ambriz NP   sucralfate (CARAFATE) 1 gram tablet Take 1 g by mouth four (4) times daily. Provider, Historical   omeprazole (PRILOSEC) 40 mg capsule Take 1 Cap by mouth daily. 11/5/19   Madie Back MD   sildenafil citrate (VIAGRA) 50 mg tablet Take 1 Tab by mouth as needed (ED). 5/6/19   Willi OGDEN III, DO   tamsulosin (FLOMAX) 0.4 mg capsule TAKE ONE CAPSULE BY MOUTH EVERY EVENING 2/28/19   Provider, Historical   alpha-D-galactosidase (BEANO PO) Take 1 Tab by mouth two (2) times a day. Other, MD Flynn   cetirizine (ZYRTEC) 10 mg tablet Take 10 mg by mouth nightly.     Provider, Historical     Allergies   Allergen Reactions    Shellfish Derived Anaphylaxis     Soft shell crabs    Morphine Nausea and Vomiting    Pcn [Penicillins] Other (comments)     Pt stated \"always been told PCN\"  Pt tolerated other cephalosporins in the past      Family History   Problem Relation Age of Onset    Cancer Father         Breast and Colon    Cancer Mother         LEUKEMIA    No Known Problems Sister     No Known Problems Brother     No Known Problems Sister     Anesth Problems Neg Hx         SOCIAL HISTORY:  Patient resides:  Independently x   Assisted Living    SNF    With family care       Smoking history:   None x   Former    Chronic      Alcohol history:   None    Social x   Chronic      Ambulates:   Independently x   w/cane    w/walker    w/wc    CODE STATUS:  DNR    Full x   Other      Objective:     Physical Exam:     Visit Vitals  /69 (BP 1 Location: Right arm, BP Patient Position: At rest)   Pulse 95   Temp 98.4 °F (36.9 °C)   Resp 16   Ht 5' 8\" (1.727 m)   Wt 68 kg (149 lb 14.6 oz)   SpO2 95%   BMI 22.79 kg/m²      O2 Device: Room air    General : alert x 3, awake, moderately distressed, pleasant male, appears to be stated age  [de-identified]: PEERL, EOMI, moist mucus membrane, TM clear  Neck: supple, no JVD, no meningeal signs  Chest: Clear to auscultation bilaterally   CVS: S1 S2 heard, Capillary refill less than 2 seconds  Abd: soft/  tender to palpation in the epigastric region, non distended, BS physiological,   Ext: no clubbing, no cyanosis, no edema, brisk 2+ DP pulses  Neuro/Psych: pleasant mood and affect, CN 2-12 grossly intact, sensory grossly within normal limit, Strength 5/5 in all extremities, DTR 1+ x 4  Skin: warm        EKG: Pending      Data Review:     Recent Days:  Recent Labs     09/02/20  0754   WBC 9.1   HGB 14.6   HCT 43.4   *     Recent Labs     09/02/20  0754      K 3.9      CO2 27   *   BUN 19   CREA 0.82   CA 9.0   ALB 3.6   ALT 62     No results for input(s): PH, PCO2, PO2, HCO3, FIO2 in the last 72 hours.     24 Hour Results:  Recent Results (from the past 24 hour(s))   CBC WITH AUTOMATED DIFF    Collection Time: 09/02/20  7:54 AM   Result Value Ref Range    WBC 9.1 4.1 - 11.1 K/uL    RBC 4.57 4.10 - 5.70 M/uL    HGB 14.6 12.1 - 17.0 g/dL    HCT 43.4 36.6 - 50.3 %    MCV 95.0 80.0 - 99.0 FL    MCH 31.9 26.0 - 34.0 PG    MCHC 33.6 30.0 - 36.5 g/dL    RDW 12.7 11.5 - 14.5 %    PLATELET 827 (L) 150 - 400 K/uL    MPV 10.7 8.9 - 12.9 FL    NRBC 0.0 0  WBC    ABSOLUTE NRBC 0.00 0.00 - 0.01 K/uL    LYMPHOCYTES 4 (L) 12 - 49 %    MONOCYTES 9 5 - 13 %    EOSINOPHILS 0 0 - 7 %    BASOPHILS 0 0 - 1 %    ABS. LYMPHOCYTES 0.4 (L) 0.8 - 3.5 K/UL    ABS. MONOCYTES 0.8 0.0 - 1.0 K/UL    ABS. EOSINOPHILS 0.0 0.0 - 0.4 K/UL    ABS. BASOPHILS 0.0 0.0 - 0.1 K/UL    NEUTROPHILS 87 (H) 32 - 75 %    IMMATURE GRANULOCYTES 0 0.0 - 0.5 %    ABS. NEUTROPHILS 7.9 1.8 - 8.0 K/UL    ABS. IMM. GRANS. 0.0 0.00 - 0.04 K/UL    DF SMEAR SCANNED      RBC COMMENTS MACROCYTOSIS  1+       METABOLIC PANEL, COMPREHENSIVE    Collection Time: 09/02/20  7:54 AM   Result Value Ref Range    Sodium 137 136 - 145 mmol/L    Potassium 3.9 3.5 - 5.1 mmol/L    Chloride 104 97 - 108 mmol/L    CO2 27 21 - 32 mmol/L    Anion gap 6 5 - 15 mmol/L    Glucose 106 (H) 65 - 100 mg/dL    BUN 19 6 - 20 MG/DL    Creatinine 0.82 0.70 - 1.30 MG/DL    BUN/Creatinine ratio 23 (H) 12 - 20      GFR est AA >60 >60 ml/min/1.73m2    GFR est non-AA >60 >60 ml/min/1.73m2    Calcium 9.0 8.5 - 10.1 MG/DL    Bilirubin, total 2.7 (H) 0.2 - 1.0 MG/DL    ALT (SGPT) 62 12 - 78 U/L    AST (SGOT) 20 15 - 37 U/L    Alk. phosphatase 215 (H) 45 - 117 U/L    Protein, total 6.5 6.4 - 8.2 g/dL    Albumin 3.6 3.5 - 5.0 g/dL    Globulin 2.9 2.0 - 4.0 g/dL    A-G Ratio 1.2 1.1 - 2.2     LIPASE    Collection Time: 09/02/20  7:54 AM   Result Value Ref Range    Lipase 32 (L) 73 - 393 U/L   SAMPLES BEING HELD    Collection Time: 09/02/20  7:54 AM   Result Value Ref Range    SAMPLES BEING HELD 1blu,1red     COMMENT        Add-on orders for these samples will be processed based on acceptable specimen integrity and analyte stability, which may vary by analyte. Imaging:   Ct Abd Pelv W Cont    Result Date: 9/2/2020  IMPRESSION: Mild left hydronephrosis and delayed nephrogram of uncertain etiology as a ureteral stone is not visualized. Status post Whipple.  Paraceliac soft tissue abnormality is unchanged compared to the prior exam. Inflammatory changes in the left anterior pararenal space unchanged.     Assessment:     Abdominal pain with nausea and vomiting: Unclear etiology, possible acute on chronic pancreatitis, patient will be observed on a medical bed, IV hydration, pain control, patient with hyperbilirubinemia, right upper quadrant ultrasound, n.p.o., GI consult, supportive care and close monitoring, IV antiemetics, if symptoms persist may consider further intervention and diagnostics, reassess as needed    Dehydration: Patient appears to be dehydrated, gentle IV hydration, repeat labs in the morning, continue to monitor    History of pancreatic cancer: Patient's oncologist is Dr. Elo Barrow, may see consult by patient in house, supportive care, pain control, close monitoring    Left hydronephrosis: Unclear etiology, ? related to malignancy, obtain UA, urology consult, pain control, supportive care, close monitoring      Diabetes mellitus type 2: Sliding scale level on insulin, Accu-Cheks, diet control, close monitoring, further intervention per hospital course    Hypertension: Continue to monitor    GI DVT prophylaxis: Patient on heparin                 Signed By: Alley Case MD     September 2, 2020

## 2020-09-02 NOTE — ED PROVIDER NOTES
The history is provided by the patient. No  was used. Abdominal Pain    This is a recurrent problem. The current episode started more than 2 days ago. The problem occurs constantly. The problem has been gradually worsening. The pain is associated with vomiting and previous surgery. The pain is located in the epigastric region. The pain is at a severity of 10/10. The pain is severe. Associated symptoms include anorexia, nausea and vomiting. Pertinent negatives include no fever, no belching, no diarrhea, no flatus, no hematochezia, no melena, no constipation, no dysuria, no frequency, no hematuria, no headaches, no arthralgias, no myalgias, no trauma, no chest pain and no back pain. Nothing worsens the pain. The pain is relieved by nothing. Past workup includes CT scan, ultrasound, esophagogastroduodenoscopy. His past medical history is significant for cancer and DM. Past Medical History:   Diagnosis Date    Arrhythmia     Previous a.fib, ablation, NSR now; NO LONGER FOLLOWED BY CARDIOLOGIST.     Autoimmune disease ([address]     SJOGREN'S    Cancer ([address]     COMMON BILE DUCT, ADENOCARCINOMA    Diabetes ([address]     Family history of skin cancer     Hypertension     Sepsis ([facility] [address] 2017    STENTING TO BILE DUCT    Status post chemotherapy     Stroke [facility]) 2008    brain aneurysm - no deficits    Sun-damaged skin     Sunburn, blistering        Past Surgical History:   Procedure Laterality Date    HX GI      COLONOSCOPY, POLYPS (BENIGN)    HX GI  10/06/2017    [facility]     [address]  [address]    Ablation     HX ORTHOPAEDIC  2000    Spinal fusion W/ HARDWARE    HX OTHER SURGICAL  11/07/2017    [place] [facility], Dr. Bulmaro Lester  2017    PORTACATH - then removed    NEUROLOGICAL PROCEDURE UNLISTED  2005    Ting hole washout from cerebral hemorrhage         Family History:   Problem Relation Age of Onset    Cancer Father         Breast and Colon    Cancer Mother         LEUKEMIA    No Known Problems Sister     No Known Problems Brother     No Known Problems Sister     Anesth Problems Neg Hx        Social History     Socioeconomic History    Marital status:      Spouse name: Not on file    Number of children: Not on file    Years of education: Not on file    Highest education level: Not on file   Occupational History    Not on file   Social Needs    Financial resource strain: Not on file    Food insecurity     Worry: Not on file     Inability: Not on file   [employer] needs     Medical: Not on file     Non-medical: Not on file   Tobacco Use    Smoking status: Never Smoker    Smokeless tobacco: Never Used   Substance and Sexual Activity    Alcohol use: Never     Frequency: Never     Comment: very rare    Drug use: No    Sexual activity: Yes     Partners: Female   Lifestyle    Physical activity     Days per week: Not on file     Minutes per session: Not on file    Stress: Not on file   Relationships    Social connections     Talks on phone: Not on file     Gets together: Not on file     Attends Evangelical service: Not on file     Active member of club or organization: Not on file     Attends meetings of clubs or organizations: Not on file     Relationship status: Not on file    Intimate partner violence     Fear of current or ex partner: Not on file     Emotionally abused: Not on file     Physically abused: Not on file     Forced sexual activity: Not on file   Other Topics Concern    Not on file   Social History Narrative    Not on file         ALLERGIES: Shellfish derived; Morphine; and Pcn [penicillins]    Review of Systems   Constitutional: Negative for activity change, chills and fever. HENT: Negative for nosebleeds, sore throat, trouble swallowing and voice change. Eyes: Negative for visual disturbance. Respiratory: Negative for shortness of breath. Cardiovascular: Negative for chest pain and palpitations. Gastrointestinal: Positive for abdominal pain, anorexia, nausea and vomiting. Negative for constipation, diarrhea, flatus, hematochezia and melena. Genitourinary: Negative for difficulty urinating, dysuria, frequency, hematuria and urgency. Musculoskeletal: Negative for arthralgias, back pain, myalgias, neck pain and neck stiffness. Skin: Negative for color change. Allergic/Immunologic: Negative for immunocompromised state. Neurological: Negative for dizziness, seizures, syncope, weakness, light-headedness, numbness and headaches. Psychiatric/Behavioral: Negative for behavioral problems, confusion, hallucinations, self-injury and suicidal ideas. Vitals:    09/02/20 0721   BP: 111/67   Pulse: 90   Resp: 18   Temp: 98.4 °F (36.9 °C)   SpO2: 97%            Physical Exam  Vitals signs and nursing note reviewed. Constitutional:       General: He is not in acute distress. Appearance: He is well-developed. He is not diaphoretic. HENT:      Head: Normocephalic and atraumatic. Eyes:      Pupils: Pupils are equal, round, and reactive to light. Neck:      Musculoskeletal: Normal range of motion and neck supple. Cardiovascular:      Rate and Rhythm: Normal rate and regular rhythm. Heart sounds: Normal heart sounds. No murmur. No friction rub. No gallop. Pulmonary:      Effort: Pulmonary effort is normal. No respiratory distress. Breath sounds: Normal breath sounds. No wheezing. Abdominal:      General: Bowel sounds are normal. There is no distension. Palpations: Abdomen is soft. Tenderness: There is generalized abdominal tenderness. There is no guarding or rebound. Musculoskeletal: Normal range of motion. Skin:     General: Skin is warm. Findings: No rash. Neurological:      Mental Status: He is alert and oriented to person, place, and time. Psychiatric:         Behavior: Behavior normal.         Thought Content:  Thought content normal.         Judgment: Judgment normal.          MDM     This is a 81-year-old male with past medical history, review of systems, physical exam as above, presenting with complaints of abdominal pain, nausea and vomiting. Patient states symptoms have been worsening over the last 6 days. He endorses recurrent bouts of pancreatitis, following Whipple for bile duct cancer. Patient states he is undergoing radiation therapy for recurrent metastatic disease, also involving the pancreas. He endorses no relief from oral oxycodone, tramadol, topical fentanyl patches and antiemetics at home. He states he is passing his bowels, decreased appetite, denies fevers. Physical exam is remarkable for well-appearing elderly male, in no acute distress, noted to have clear breath sounds, regular rate and rhythm without murmurs gallops or rubs, soft abdomen, with diffuse tenderness, worse in the epigastrium. He is noted to be afebrile, normotensive, without tachycardia, satting well on room air. Suspect pain secondary to inflammation versus metastatic disease. Plan to obtain CMP, CBC, lipase, CT scan of the abdomen and pelvis. Will provide IV fluids, antiemetics, pain control, reassess, and make a disposition. Procedures    10:56 AM  Pan well controlled, persistent nausea, will tx. CT without acute change, mildly elevated bili and AP, will consult GI for recs. Perfect Serve Consult for Admission  12:00 PM    ED Room Number: JY66/91  Patient Name and age:  Erica Cope. 62 y.o.  male  Working Diagnosis:   1. Intractable nausea and vomiting    2. Elevated bilirubin    3.  Abdominal pain, generalized        COVID-19 Suspicion:  no  Sepsis present:  no  Reassessment needed: no  Code Status:  Full Code  Readmission: no  Isolation Requirements:  no  Recommended Level of Care:  med/surg  Department:[facility] Adult ED - 21   Other:  D/w Dr. Maximiliano Multani

## 2020-09-02 NOTE — ED NOTES
Bedside and Verbal shift change report given to Deshawn Hernandez (oncoming nurse) by Samuel MCALLISTER (offgoing nurse). Report included the following information SBAR, Kardex, ED Summary and MAR. Pt vomiting after second dose of phenergan. MD made aware. Orders to follow.

## 2020-09-03 ENCOUNTER — APPOINTMENT (OUTPATIENT)
Dept: CT IMAGING | Age: 65
DRG: 423 | End: 2020-09-03
Attending: NURSE PRACTITIONER
Payer: COMMERCIAL

## 2020-09-03 ENCOUNTER — APPOINTMENT (OUTPATIENT)
Dept: MRI IMAGING | Age: 65
DRG: 423 | End: 2020-09-03
Attending: SURGERY
Payer: COMMERCIAL

## 2020-09-03 LAB
ALBUMIN SERPL-MCNC: 2.9 G/DL (ref 3.5–5)
ALBUMIN/GLOB SERPL: 1.1 {RATIO} (ref 1.1–2.2)
ALP SERPL-CCNC: 160 U/L (ref 45–117)
ALT SERPL-CCNC: 44 U/L (ref 12–78)
ANION GAP SERPL CALC-SCNC: 9 MMOL/L (ref 5–15)
APPEARANCE UR: CLEAR
AST SERPL-CCNC: 14 U/L (ref 15–37)
BACTERIA URNS QL MICRO: NEGATIVE /HPF
BASOPHILS # BLD: 0 K/UL (ref 0–0.1)
BASOPHILS NFR BLD: 0 % (ref 0–1)
BILIRUB SERPL-MCNC: 3.5 MG/DL (ref 0.2–1)
BILIRUB UR QL CFM: NEGATIVE
BUN SERPL-MCNC: 14 MG/DL (ref 6–20)
BUN/CREAT SERPL: 21 (ref 12–20)
CALCIUM SERPL-MCNC: 8.7 MG/DL (ref 8.5–10.1)
CHLORIDE SERPL-SCNC: 104 MMOL/L (ref 97–108)
CO2 SERPL-SCNC: 25 MMOL/L (ref 21–32)
COLOR UR: ABNORMAL
CREAT SERPL-MCNC: 0.67 MG/DL (ref 0.7–1.3)
DIFFERENTIAL METHOD BLD: ABNORMAL
EOSINOPHIL # BLD: 0 K/UL (ref 0–0.4)
EOSINOPHIL NFR BLD: 0 % (ref 0–7)
EPITH CASTS URNS QL MICRO: ABNORMAL /LPF
ERYTHROCYTE [DISTWIDTH] IN BLOOD BY AUTOMATED COUNT: 12.6 % (ref 11.5–14.5)
GLOBULIN SER CALC-MCNC: 2.7 G/DL (ref 2–4)
GLUCOSE SERPL-MCNC: 115 MG/DL (ref 65–100)
GLUCOSE UR STRIP.AUTO-MCNC: NEGATIVE MG/DL
HCT VFR BLD AUTO: 39.7 % (ref 36.6–50.3)
HGB BLD-MCNC: 13.3 G/DL (ref 12.1–17)
HGB UR QL STRIP: ABNORMAL
HYALINE CASTS URNS QL MICRO: ABNORMAL /LPF (ref 0–5)
IMM GRANULOCYTES # BLD AUTO: 0.1 K/UL (ref 0–0.04)
IMM GRANULOCYTES NFR BLD AUTO: 1 % (ref 0–0.5)
KETONES UR QL STRIP.AUTO: 40 MG/DL
LEUKOCYTE ESTERASE UR QL STRIP.AUTO: ABNORMAL
LYMPHOCYTES # BLD: 0.3 K/UL (ref 0.8–3.5)
LYMPHOCYTES NFR BLD: 4 % (ref 12–49)
MCH RBC QN AUTO: 31.4 PG (ref 26–34)
MCHC RBC AUTO-ENTMCNC: 33.5 G/DL (ref 30–36.5)
MCV RBC AUTO: 93.9 FL (ref 80–99)
MONOCYTES # BLD: 0.6 K/UL (ref 0–1)
MONOCYTES NFR BLD: 8 % (ref 5–13)
NEUTS SEG # BLD: 7 K/UL (ref 1.8–8)
NEUTS SEG NFR BLD: 87 % (ref 32–75)
NITRITE UR QL STRIP.AUTO: NEGATIVE
NRBC # BLD: 0 K/UL (ref 0–0.01)
NRBC BLD-RTO: 0 PER 100 WBC
PH UR STRIP: 6 [PH] (ref 5–8)
PLATELET # BLD AUTO: 87 K/UL (ref 150–400)
PMV BLD AUTO: 10.9 FL (ref 8.9–12.9)
POTASSIUM SERPL-SCNC: 3.6 MMOL/L (ref 3.5–5.1)
PROT SERPL-MCNC: 5.6 G/DL (ref 6.4–8.2)
PROT UR STRIP-MCNC: ABNORMAL MG/DL
RBC # BLD AUTO: 4.23 M/UL (ref 4.1–5.7)
RBC #/AREA URNS HPF: ABNORMAL /HPF (ref 0–5)
RBC MORPH BLD: ABNORMAL
SODIUM SERPL-SCNC: 138 MMOL/L (ref 136–145)
SP GR UR REFRACTOMETRY: 1.02
UROBILINOGEN UR QL STRIP.AUTO: 4 EU/DL (ref 0.2–1)
WBC # BLD AUTO: 8 K/UL (ref 4.1–11.1)
WBC URNS QL MICRO: ABNORMAL /HPF (ref 0–4)

## 2020-09-03 PROCEDURE — 80053 COMPREHEN METABOLIC PANEL: CPT

## 2020-09-03 PROCEDURE — 85025 COMPLETE CBC W/AUTO DIFF WBC: CPT

## 2020-09-03 PROCEDURE — 74011250637 HC RX REV CODE- 250/637: Performed by: FAMILY MEDICINE

## 2020-09-03 PROCEDURE — C9113 INJ PANTOPRAZOLE SODIUM, VIA: HCPCS | Performed by: FAMILY MEDICINE

## 2020-09-03 PROCEDURE — 74011000258 HC RX REV CODE- 258: Performed by: FAMILY MEDICINE

## 2020-09-03 PROCEDURE — 96376 TX/PRO/DX INJ SAME DRUG ADON: CPT

## 2020-09-03 PROCEDURE — 77030021566 MRI ABD W MRCP W WO CONT

## 2020-09-03 PROCEDURE — A9585 GADOBUTROL INJECTION: HCPCS | Performed by: FAMILY MEDICINE

## 2020-09-03 PROCEDURE — 74011250636 HC RX REV CODE- 250/636: Performed by: FAMILY MEDICINE

## 2020-09-03 PROCEDURE — 81001 URINALYSIS AUTO W/SCOPE: CPT

## 2020-09-03 PROCEDURE — 96372 THER/PROPH/DIAG INJ SC/IM: CPT

## 2020-09-03 PROCEDURE — 99222 1ST HOSP IP/OBS MODERATE 55: CPT | Performed by: SURGERY

## 2020-09-03 PROCEDURE — 99218 HC RM OBSERVATION: CPT

## 2020-09-03 PROCEDURE — 74011000250 HC RX REV CODE- 250: Performed by: FAMILY MEDICINE

## 2020-09-03 PROCEDURE — 74176 CT ABD & PELVIS W/O CONTRAST: CPT

## 2020-09-03 PROCEDURE — 74011250636 HC RX REV CODE- 250/636: Performed by: INTERNAL MEDICINE

## 2020-09-03 PROCEDURE — 36415 COLL VENOUS BLD VENIPUNCTURE: CPT

## 2020-09-03 RX ORDER — HYDROMORPHONE HYDROCHLORIDE 1 MG/ML
2 INJECTION, SOLUTION INTRAMUSCULAR; INTRAVENOUS; SUBCUTANEOUS
Status: DISCONTINUED | OUTPATIENT
Start: 2020-09-03 | End: 2020-09-11 | Stop reason: HOSPADM

## 2020-09-03 RX ORDER — HYDROMORPHONE HYDROCHLORIDE 1 MG/ML
2 INJECTION, SOLUTION INTRAMUSCULAR; INTRAVENOUS; SUBCUTANEOUS
Status: DISCONTINUED | OUTPATIENT
Start: 2020-09-03 | End: 2020-09-03

## 2020-09-03 RX ADMIN — ONDANSETRON 4 MG: 2 INJECTION INTRAMUSCULAR; INTRAVENOUS at 14:21

## 2020-09-03 RX ADMIN — ONDANSETRON 4 MG: 2 INJECTION INTRAMUSCULAR; INTRAVENOUS at 10:29

## 2020-09-03 RX ADMIN — Medication 10 ML: at 22:08

## 2020-09-03 RX ADMIN — HYDROMORPHONE HYDROCHLORIDE 2 MG: 1 INJECTION, SOLUTION INTRAMUSCULAR; INTRAVENOUS; SUBCUTANEOUS at 17:55

## 2020-09-03 RX ADMIN — HYDROMORPHONE HYDROCHLORIDE 2 MG: 1 INJECTION, SOLUTION INTRAMUSCULAR; INTRAVENOUS; SUBCUTANEOUS at 21:45

## 2020-09-03 RX ADMIN — HEPARIN SODIUM 5000 UNITS: 5000 INJECTION INTRAVENOUS; SUBCUTANEOUS at 21:45

## 2020-09-03 RX ADMIN — SODIUM CHLORIDE 100 ML: 900 INJECTION, SOLUTION INTRAVENOUS at 23:15

## 2020-09-03 RX ADMIN — ONDANSETRON 4 MG: 2 INJECTION INTRAMUSCULAR; INTRAVENOUS at 03:07

## 2020-09-03 RX ADMIN — HYDROMORPHONE HYDROCHLORIDE 1 MG: 1 INJECTION, SOLUTION INTRAMUSCULAR; INTRAVENOUS; SUBCUTANEOUS at 10:29

## 2020-09-03 RX ADMIN — GADOBUTROL 7 ML: 604.72 INJECTION INTRAVENOUS at 23:15

## 2020-09-03 RX ADMIN — TAMSULOSIN HYDROCHLORIDE 0.4 MG: 0.4 CAPSULE ORAL at 00:00

## 2020-09-03 RX ADMIN — HYDROMORPHONE HYDROCHLORIDE 1 MG: 1 INJECTION, SOLUTION INTRAMUSCULAR; INTRAVENOUS; SUBCUTANEOUS at 14:21

## 2020-09-03 RX ADMIN — SODIUM CHLORIDE 40 MG: 9 INJECTION INTRAMUSCULAR; INTRAVENOUS; SUBCUTANEOUS at 14:21

## 2020-09-03 RX ADMIN — HYDROMORPHONE HYDROCHLORIDE 1 MG: 1 INJECTION, SOLUTION INTRAMUSCULAR; INTRAVENOUS; SUBCUTANEOUS at 06:23

## 2020-09-03 RX ADMIN — ONDANSETRON 4 MG: 2 INJECTION INTRAMUSCULAR; INTRAVENOUS at 17:55

## 2020-09-03 RX ADMIN — HEPARIN SODIUM 5000 UNITS: 5000 INJECTION INTRAVENOUS; SUBCUTANEOUS at 06:22

## 2020-09-03 RX ADMIN — Medication 10 ML: at 06:22

## 2020-09-03 RX ADMIN — Medication 10 ML: at 14:21

## 2020-09-03 RX ADMIN — DUTASTERIDE 0.5 MG: 0.5 CAPSULE, LIQUID FILLED ORAL at 09:28

## 2020-09-03 RX ADMIN — HEPARIN SODIUM 5000 UNITS: 5000 INJECTION INTRAVENOUS; SUBCUTANEOUS at 14:21

## 2020-09-03 RX ADMIN — SODIUM CHLORIDE, SODIUM LACTATE, POTASSIUM CHLORIDE, AND CALCIUM CHLORIDE 100 ML/HR: 600; 310; 30; 20 INJECTION, SOLUTION INTRAVENOUS at 12:33

## 2020-09-03 RX ADMIN — ONDANSETRON 4 MG: 2 INJECTION INTRAMUSCULAR; INTRAVENOUS at 06:23

## 2020-09-03 RX ADMIN — FINASTERIDE 5 MG: 5 TABLET, FILM COATED ORAL at 09:28

## 2020-09-03 RX ADMIN — HYDROMORPHONE HYDROCHLORIDE 1 MG: 1 INJECTION, SOLUTION INTRAMUSCULAR; INTRAVENOUS; SUBCUTANEOUS at 02:58

## 2020-09-03 RX ADMIN — ONDANSETRON 4 MG: 2 INJECTION INTRAMUSCULAR; INTRAVENOUS at 21:58

## 2020-09-03 RX ADMIN — SODIUM CHLORIDE, SODIUM LACTATE, POTASSIUM CHLORIDE, AND CALCIUM CHLORIDE 100 ML/HR: 600; 310; 30; 20 INJECTION, SOLUTION INTRAVENOUS at 03:02

## 2020-09-03 NOTE — PROGRESS NOTES
Bedside and Verbal shift change [facility] by Wilam Mejia (offgoing nurse). Report included the following information SBAR and Kardex.

## 2020-09-03 NOTE — PROGRESS NOTES
Problem: Falls - Risk of  Goal: *Absence of Falls  Description: Document Terry Gaby Fall Risk and appropriate interventions in the flowsheet.   Outcome: Progressing Towards Goal  Note: Fall Risk Interventions:            Medication Interventions: Patient to call before getting OOB                   Problem: Patient Education: Go to Patient Education Activity  Goal: Patient/Family Education  Outcome: Progressing Towards Goal     Problem: Discharge Planning  Goal: *Discharge to safe environment  Outcome: Progressing Towards Goal

## 2020-09-03 NOTE — PROGRESS NOTES
[address]  [address] [facility] [address] Octavio Pradhan, [address]  [phone]                GI PROGRESS NOTE      NAME:   Marixa Be :    1955   MRN:    [id number]     Assessment/Plan   Abdominal pain, N/V:  Chronic pancreatic duct stricture previously treated with stenting  - Stent removed 2020, but unable to replace  - ALP is 160 today and TBili increased 3.5 today , otherwise normal LFTs, continue to monitor   - Lipase normal - Previous triglyceride and IgG4 normal  -Surgery consult placed for further recommendations  -Considering possible EUS guided plexus block  -Pain management and anti-emetics prn     Extrahepatic cholangiocarcinoma: diagnosed , treated with pylorus-preserving Whipple and chemo  - T3 N1 (1 of 12 LN +ve);  Invasion into pancreas; R0 resection  - CT guided biopsy 2/3/2020 of paraaortic soft tissue mass - positive for adenocarcinoma  - PET scan 2020 showed increased size  - Follows with Dr. Tayo Cuba and has been seen by Palliative OP    Will discuss with      Patient Active Problem List   Diagnosis Code    Obstructive jaundice K83.1    Common bile duct (CBD) obstruction K83.1    UTI (urinary tract infection) N39.0    Cholangiocarcinoma of biliary tract (HCC) C22.1    Cholangiocarcinoma determined by biopsy of biliary tract ([facility] C24.9    Paroxysmal atrial fibrillation (HCC) I48.0    S/P ablation of atrial fibrillation Z98.890, Z86.79    Essential hypertension I10    Cramps, muscle, general R25.2    Low vitamin D level R79.89    Low vitamin B12 level E53.8    Vomiting R11.10    Controlled type 2 diabetes mellitus without complication, without long-term current use of insulin (HCC) E11.9    Acute pancreatitis K85.90    Leukocytosis D72.829    Pancreatitis K85.90    Abdominal pain R10.9    Nausea & vomiting R11.2       Subjective:     Marixa Be is a 62 y.o.  male Patient still with upper and lower abdominal pain along with nausea, stated pain medications and anti-emetics helpful. Denies vomiting, no chest pain, and no shortness of breath. No stools overnight     Objective:     VITALS:   Last 24hrs VS reviewed since prior hospitalist progress note. Most recent are:  Visit Vitals  /70 (BP 1 Location: Right arm, BP Patient Position: At rest)   Pulse 84   Temp 98.1 °F (36.7 °C)   Resp 18   Ht 5' 8\" (1.727 m)   Wt 68 kg (149 lb 14.6 oz)   SpO2 96%   BMI 22.79 kg/m²     No intake or output data in the 24 hours ending 09/03/20 1106     PHYSICAL EXAM:  General   well developed, well nourished, appears stated age, in no acute distress  EENT  Normocephalic, Atraumatic, PERRLA, EOMI, sclera clear, nares clear, pharynx normal  Respiratory   Clear To Auscultation bilaterally  Cardiology  Regular Rate and Rythmn   Abdominal  Soft, tenderness along previous surgical incision and below umbilicus, non-distended, hypoactive bowel sounds, no palpable masses   Extremities  No clubbing, cyanosis, or edema. Pulses intact  Skin  Normal skin turgor. No rashes or skin ulcers noted  Neurological  No focal neurological deficits noted  Psychological  Oriented x 3. Normal affect. Lab Data   Recent Results (from the past 12 hour(s))   METABOLIC PANEL, COMPREHENSIVE    Collection Time: 09/03/20  3:04 AM   Result Value Ref Range    Sodium 138 136 - 145 mmol/L    Potassium 3.6 3.5 - 5.1 mmol/L    Chloride 104 97 - 108 mmol/L    CO2 25 21 - 32 mmol/L    Anion gap 9 5 - 15 mmol/L    Glucose 115 (H) 65 - 100 mg/dL    BUN 14 6 - 20 MG/DL    Creatinine 0.67 (L) 0.70 - 1.30 MG/DL    BUN/Creatinine ratio 21 (H) 12 - 20      GFR est AA >60 >60 ml/min/1.73m2    GFR est non-AA >60 >60 ml/min/1.73m2    Calcium 8.7 8.5 - 10.1 MG/DL    Bilirubin, total 3.5 (H) 0.2 - 1.0 MG/DL    ALT (SGPT) 44 12 - 78 U/L    AST (SGOT) 14 (L) 15 - 37 U/L    Alk.  phosphatase 160 (H) 45 - 117 U/L    Protein, total 5.6 (L) 6.4 - 8.2 g/dL    Albumin 2.9 (L) 3.5 - 5.0 g/dL Globulin 2.7 2.0 - 4.0 g/dL    A-G Ratio 1.1 1.1 - 2.2     CBC WITH AUTOMATED DIFF    Collection Time: 09/03/20  3:04 AM   Result Value Ref Range    WBC 8.0 4.1 - 11.1 K/uL    RBC 4.23 4. 10 - 5.70 M/uL    HGB 13.3 12.1 - 17.0 g/dL    HCT 39.7 36.6 - 50.3 %    MCV 93.9 80.0 - 99.0 FL    MCH 31.4 26.0 - 34.0 PG    MCHC 33.5 30.0 - 36.5 g/dL    RDW 12.6 11.5 - 14.5 %    PLATELET 87 (L) 507 - 400 K/uL    MPV 10.9 8.9 - 12.9 FL    NRBC 0.0 0  WBC    ABSOLUTE NRBC 0.00 0.00 - 0.01 K/uL    NEUTROPHILS 87 (H) 32 - 75 %    LYMPHOCYTES 4 (L) 12 - 49 %    MONOCYTES 8 5 - 13 %    EOSINOPHILS 0 0 - 7 %    BASOPHILS 0 0 - 1 %    IMMATURE GRANULOCYTES 1 (H) 0.0 - 0.5 %    ABS. NEUTROPHILS 7.0 1.8 - 8.0 K/UL    ABS. LYMPHOCYTES 0.3 (L) 0.8 - 3.5 K/UL    ABS. MONOCYTES 0.6 0.0 - 1.0 K/UL    ABS. EOSINOPHILS 0.0 0.0 - 0.4 K/UL    ABS. BASOPHILS 0.0 0.0 - 0.1 K/UL    ABS. IMM.  GRANS. 0.1 (H) 0.00 - 0.04 K/UL    DF SMEAR SCANNED      RBC COMMENTS ANISOCYTOSIS  1+       URINALYSIS W/MICROSCOPIC    Collection Time: 09/03/20  7:05 AM   Result Value Ref Range    Color DARK YELLOW      Appearance CLEAR CLEAR      Specific gravity 1.025      pH (UA) 6.0 5.0 - 8.0      Protein TRACE (A) NEG mg/dL    Glucose Negative NEG mg/dL    Ketone 40 (A) NEG mg/dL    Blood MODERATE (A) NEG      Urobilinogen 4.0 (H) 0.2 - 1.0 EU/dL    Nitrites Negative NEG      Leukocyte Esterase TRACE (A) NEG      WBC 0-4 0 - 4 /hpf    RBC 20-50 0 - 5 /hpf    Epithelial cells FEW FEW /lpf    Bacteria Negative NEG /hpf    Hyaline cast 2-5 0 - 5 /lpf   BILIRUBIN, CONFIRM    Collection Time: 09/03/20  7:05 AM   Result Value Ref Range    Bilirubin UA, confirm Negative           Medications: Reviewed  Date/Time:  9/3/2020  Kimberly Covarrubias, NP

## 2020-09-03 NOTE — PROGRESS NOTES
Patient seen and examined. Full consult to follow. Discussed with Dr. Jeremy Starr. Will order MRI/MRCP due to elevated bilirubin and to better assess for pancreatic duct dilation. Etiology of his pain is unclear. It is possible this could be acute on chronic pancreatitis however.       Benedict Alvarez MD  9/3/2020   6:24 PM

## 2020-09-03 NOTE — ROUTINE PROCESS
TRANSFER - IN REPORT: 
 
Verbal report received from [facility]) on Lillie Alberto.  being received from ED(unit) for routine progression of care Report consisted of patients Situation, Background, Assessment and  
Recommendations(SBAR). Information from the following report(s) ED Summary was reviewed with the receiving nurse. Opportunity for questions and clarification was provided. Assessment completed upon patients arrival to unit and care assumed.

## 2020-09-03 NOTE — PROGRESS NOTES
Comprehensive Nutrition Assessment    Type and Reason for Visit: Initial    Nutrition Recommendations/Plan:    1. Once diet advanced recommend trial of increase of Creon dose since continued diarrhea despite current regimen:   - Creon 24,000 - 4 capsules per meal    - titrate up as needed to max dose of Creon 24,000 - 7 capsules per meal   - adjust order for snacks to 1/2 of meal dosing  2. Follow for results of fat-soluble vitamins (to be checked tomorrow)  3. Add imodium PRN       Nutrition Assessment:    Pt admitted for nausea and vomiting. PMHx: Sjogren's dx, bile duct adenocarcinoma, pancreatic CA s/p pylorus-preserving Whipple (2017), stroke, HTN, DM. Abd pain with N/V prior to admit. Recurrence of cancer s/p Whipple and chemo in 2017. PET scan in 6/2020 showing increased siz of adenocarcinoma. Followed by [employer] for oncology care. Palliative Care as OP for pain mgmt. Chronic pancreatic duct stricture with previous stents but unable to be replaced on last exchange. Surgery consult pending and possible EUS for plexus block for pain mgmt noted. Pt visited today. Still NPO. He reports poor PO intake for some time at home with N/V.  N/V not worsened by any specific foods. Often vomiting is mostly just bile and not food. Diarrhea has also been an issue with Creon started earlier this year. No improvement in stool consistency since Creon. Consider adjustment of Creon dose (see above) to see if will help with stools. Imodium has been effective at helping diarrhea but he has not been taking cholestyramine since it did not help. Avoids plain eggs and honey since they cause vomiting and cheese since causes diarrhea. Does not consume large amounts of concentrated sweets. Will follow for plans from surgery/GI and diet advancement. Diet education to help with symptom mgmt as appropriate. Discussed with pt the resource of the oncology dietitian as needed once discharged.      UBW prior to initial dx in 2017 was around 245#. Following Whipple has sepsis with severe wt loss to around 165#. Has been stable at this until recently with 15# (10%) wt loss x ~8 months. Malnutrition Assessment:  Malnutrition Status:  Severe malnutrition    Context:  Acute illness     Findings of the 6 clinical characteristics of malnutrition:   Energy Intake:  1 - 75% or less of est energy req for 7 or more days  Weight Loss:  Unable to assess     Body Fat Loss:  7 - Moderate body fat loss, Triceps, Orbital   Muscle Mass Loss:  7 - Moderate muscle mass loss, Scapula (trapezius)  Fluid Accumulation:  Unable to assess,     Strength:  Not performed     Nutritionally Significant Medications: Creon 73105 (6 capsules 3x/day), protonix, LR @ 100ml/hr; PRN: zofran    Estimated Daily Nutrient Needs:  Energy (kcal):  3425-1191(MSJ x 1.3-1.4 + 250kcal)  Protein (g):  95-109g (1..4-1.6g/kg)       Fluid (ml/day):  2200 - 1ml/kcal    Nutrition Related Findings: 9/2 - loose  Wounds:  None       Current Nutrition Therapies:   Diet: NPO  Supplements: none  Meal intake: No data found.     Anthropometric Measures:  · Height:  5' 8\" (172.7 cm)  · Current Body Wt:  68 kg (149 lb 14.6 oz)   · Admission Body Wt:  149 lb 14.6 oz    · Usual Body Wt:  165 lb     · Ideal Body Wt:   :  97.3 %   Wt Readings from Last 10 Encounters:   09/02/20 68 kg (149 lb 14.6 oz)   07/06/20 68 kg (150 lb)   06/02/20 68 kg (150 lb)   05/06/20 66.2 kg (146 lb)   04/06/20 68.5 kg (151 lb)   04/01/20 68.9 kg (152 lb)   02/28/20 71.6 kg (157 lb 12.8 oz)   02/11/20 73.1 kg (161 lb 3.2 oz)   02/03/20 71.7 kg (158 lb)   01/23/20 74.5 kg (164 lb 3.2 oz)     Nutrition Diagnosis:   · Unintended weight loss, Inadequate protein-energy intake, Severe malnutrition related to increased demand for energy/nutrients, altered GI function as evidenced by weight loss, poor intake prior to admission, nausea, vomiting(pancreatic CA)    · Impaired nutrient utilization related to altered GI function as evidenced by diarrhea(pancreatic CA w/ PERT)    Nutrition Interventions:   Food and/or Nutrient Delivery: Continue NPO  Nutrition Education and Counseling: Education initiated  Coordination of Nutrition Care: Continued inpatient monitoring    Goals:  Diet advancement with tolerance of at least 75% meals in 4-5 days; wt maintenance       Nutrition Monitoring and Evaluation:   Behavioral-Environmental Outcomes: Knowledge or skill  Food/Nutrient Intake Outcomes: Food and nutrient intake, Supplement intake, Vitamin/mineral intake, Diet advancement/tolerance  Physical Signs/Symptoms Outcomes: Biochemical data, Diarrhea, Nausea/vomiting, Weight    Discharge Planning:     Too soon to determine     Mervin Holland, RD  [address] , Pager #[phone] or via [employer]

## 2020-09-03 NOTE — PROGRESS NOTES
[place]: RUR- N/A observation  1. MD consults placed. 2. Home with wife when stable. Care Management Interventions  PCP Verified by CM: Yes  Palliative Care Criteria Met (RRAT>21 & CHF Dx)?: No  Mode of Transport at Discharge: Other (see comment)(His wife will transport)  MyChart Signup: Yes  Discharge Durable Medical Equipment: No  Health Maintenance Reviewed: Yes  Physical Therapy Consult: No  Occupational Therapy Consult: No  Speech Therapy Consult: No  Current Support Network: Lives with Spouse  Confirm Follow Up Transport: Family  The Plan for Transition of Care is Related to the Following Treatment Goals : Gen. Surgery consult, urology consult, gastro consult, home when stable. The Patient and/or Patient Representative was Provided with a Choice of Provider and Agrees with the Discharge Plan?: Yes  Freedom of Choice List was Provided with Basic Dialogue that Supports the Patient's Individualized Plan of Care/Goals, Treatment Preferences and Shares the Quality Data Associated with the Providers?: Yes   Resource Information Provided?: No  Discharge Location  Discharge Placement: Home with family assistance      Reason for Admission:   Nausea and vomiting                   RUR Score:       Observation              Plan for utilizing home health:      No indication at this time. PCP: First and Last name:  Dr. Nadia Shields   Name of Practice: [facility]   Are you a current patient: Yes/No:  Yes   Approximate date of last visit:    Can you participate in a virtual visit with your PCP:  Yes                    Current Advanced Directive/Advance Care Plan: On file. Transition of Care Plan:                    Reviewed chart for transitions of care,and discussed in rounds. CM met with patient at bedside to explain role and offer support. Patient is alert and oriented x4, and confirmed demographics.  He is independent with ADLS and IADLS, lives with his supportive wife in their private residence. He has multiple family member support. His preferred pharmacy is [facility] on [address] [place] [address]. There are currently no discharge needs at this time. CIERA geronimo. Observation notice provided in writing to patient and/or caregiver as well as verbal explanation of the policy.   Patients who are in outpatient status also receive the Observation notice        [facility]

## 2020-09-03 NOTE — PROGRESS NOTES
[address] [facility]  Hospitalist Group                                                                                          Hospitalist Progress Note  Janel Burch MD  Answering service: [phone] from in house phone        Date of Service:  9/3/2020  NAME:  Nick Iverson. :  1955  MRN:  [id number]      Admission Summary:     Patient reports that he started experiencing abdominal pain associated with nausea, vomiting that started about 6 days back. Patient reports that for the past few days the pain has been getting worse, he has been having increased nausea, multiple bouts of vomiting, poor appetite, not able to tolerate p.o. Patient reports that he thinks he has another bout of pancreatitis. Patient reports that he has been taking oxycodone, tramadol, fentanyl patch and antiemetics at home but they have not been working. Patient reports that today he got concerned because he was not able to tolerate p.o., his epigastric pain persisted and he decided to come to the hospital patient was found to have mildly elevated bilirubin and was requested to be admitted under the hospitalist service. Interval history / Subjective:       Patient still with abdominal pain in the lower abdomen, epigastric area that radiates to the back.      Assessment & Plan:     Abdominal pain  -Acute onset abdominal pain, CT without acute pathology  -History of chronic pancreatitis secondary to pancreatic duct stricture, prior stent in the duct was removed in 2020  -GI following, for possible EUS guided plexus block    Abnormal LFTs  -Elevated alk phos and T bili, normal transaminases  -Following    Hydronephrosis  -Left hydronephrosis, but no other obstruction noted on CT  -Neurology following    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  -Following oncology as outpatient, currently with radiation therapy    BPH  -Continue home medications    Code status: FULL  DVT prophylaxis: Heparin    Care Plan discussed with: Patient/Family  Anticipated Disposition: Home w/Family  Anticipated Discharge: Greater than 48 hours     Hospital Problems  Date Reviewed: 7/6/2020          Codes Class Noted POA    Nausea & vomiting ICD-10-CM: R11.2  ICD-9-CM: 787.01  9/2/2020 Unknown                Review of Systems:   A comprehensive review of systems was negative except for that written in the HPI. Vital Signs:    Last 24hrs VS reviewed since prior progress note. Most recent are:  Visit Vitals  BP 94/54 (BP 1 Location: Right arm, BP Patient Position: At rest)   Pulse 87   Temp 98.5 °F (36.9 °C)   Resp 18   Ht 5' 8\" (1.727 m)   Wt 68 kg (149 lb 14.6 oz)   SpO2 96%   BMI 22.79 kg/m²         Intake/Output Summary (Last 24 hours) at 9/3/2020 1620  Last data filed at 9/3/2020 1544  Gross per 24 hour   Intake 70 ml   Output    Net 70 ml        Physical Examination:             Constitutional:  No acute distress, cooperative, pleasant    ENT:  Oral mucosa moist, oropharynx benign. Resp:  CTA bilaterally. No wheezing/rhonchi/rales. No accessory muscle use   CV:  Regular rhythm, normal rate, no murmurs, gallops, rubs    GI:  Soft, non distended, non tender. normoactive bowel sounds, no hepatosplenomegaly     Musculoskeletal:  No edema, warm, 2+ pulses throughout    Neurologic:  Moves all extremities.   AAOx3, CN II-XII reviewed     Skin:  Good turgor, no rashes or ulcers       Data Review:    Review and/or order of clinical lab test      Labs:     Recent Labs     09/03/20 0304 09/02/20  0754   WBC 8.0 9.1   HGB 13.3 14.6   HCT 39.7 43.4   PLT 87* 107*     Recent Labs     09/03/20  0304 09/02/20  0754    137   K 3.6 3.9    104   CO2 25 27   BUN 14 19   CREA 0.67* 0.82   * 106*   CA 8.7 9.0     Recent Labs     09/03/20  0304 09/02/20  0754   ALT 44 62   * 215*   TBILI 3.5* 2.7*   TP 5.6* 6.5   ALB 2.9* 3.6   GLOB 2.7 2.9   LPSE  -- 32*     No results for input(s): INR, PTP, APTT, INREXT in the last 72 hours. No results for input(s): FE, TIBC, PSAT, FERR in the last 72 hours. No results found for: FOL, RBCF   No results for input(s): PH, PCO2, PO2 in the last 72 hours. No results for input(s): CPK, CKNDX, TROIQ in the last 72 hours.     No lab exists for component: CPKMB  Lab Results   Component Value Date/Time    Cholesterol, total 81 09/02/2020 07:54 AM    HDL Cholesterol 49 09/02/2020 07:54 AM    LDL, calculated 22.6 09/02/2020 07:54 AM    Triglyceride 47 09/02/2020 07:54 AM    CHOL/HDL Ratio 1.7 09/02/2020 07:54 AM     Lab Results   Component Value Date/Time    Glucose (POC) 120 (H) 01/02/2020 11:47 AM    Glucose (POC) 124 (H) 01/02/2020 06:48 AM    Glucose (POC) 109 (H) 01/01/2020 10:04 PM    Glucose (POC) 204 (H) 01/01/2020 05:09 PM    Glucose (POC) 129 (H) 01/01/2020 11:52 AM     Lab Results   Component Value Date/Time    Color DARK YELLOW 09/03/2020 07:05 AM    Appearance CLEAR 09/03/2020 07:05 AM    Specific gravity 1.025 09/03/2020 07:05 AM    Specific gravity 1.027 08/16/2018 08:56 AM    pH (UA) 6.0 09/03/2020 07:05 AM    Protein TRACE (A) 09/03/2020 07:05 AM    Glucose Negative 09/03/2020 07:05 AM    Ketone 40 (A) 09/03/2020 07:05 AM    Bilirubin NEGATIVE  12/31/2019 08:12 AM    Urobilinogen 4.0 (H) 09/03/2020 07:05 AM    Nitrites Negative 09/03/2020 07:05 AM    Leukocyte Esterase TRACE (A) 09/03/2020 07:05 AM    Epithelial cells FEW 09/03/2020 07:05 AM    Bacteria Negative 09/03/2020 07:05 AM    WBC 0-4 09/03/2020 07:05 AM    RBC 20-50 09/03/2020 07:05 AM         Medications Reviewed:     Current Facility-Administered Medications   Medication Dose Route Frequency    sodium chloride (NS) flush 5-40 mL  5-40 mL IntraVENous Q8H    sodium chloride (NS) flush 5-40 mL  5-40 mL IntraVENous PRN    lactated Ringers infusion  100 mL/hr IntraVENous CONTINUOUS    HYDROmorphone (PF) (DILAUDID) injection 1 mg  1 mg IntraVENous Q4H PRN  ondansetron (ZOFRAN) injection 4 mg  4 mg IntraVENous Q4H PRN    heparin (porcine) injection 5,000 Units  5,000 Units SubCUTAneous Q8H    pantoprazole (PROTONIX) 40 mg in 0.9% sodium chloride 10 mL injection  40 mg IntraVENous Q24H    dutasteride (AVODART) capsule 0.5 mg  0.5 mg Oral DAILY    finasteride (PROSCAR) tablet 5 mg  5 mg Oral DAILY    lipase-protease-amylase (CREON 12,000) capsule 6 Cap  6 Cap Oral TID WITH MEALS    tamsulosin (FLOMAX) capsule 0.4 mg  0.4 mg Oral DAILY     ______________________________________________________________________  EXPECTED LENGTH OF STAY: - - -  ACTUAL LENGTH OF STAY:          0                 Steph Gonzalez MD

## 2020-09-03 NOTE — CONSULTS
Requesting Provider: Sherry Moreau MD - Reason for Consultation: \"hydronephrosis and no hogan\"  Pre-existing [facility] Patient:   No                Patient: Harley Guaman. MRN: [id number]  SSN: [id number]    YOB: 1955  Age: 62 y.o. Sex: male     Location: [facility]/       Code Status: Full Code   PCP: Melissa Sanders, [address] [facility]   Emergency Contact:  Primary Emergency Contact: Christelle Cristina, Home Phone: [phone]   Race/Hindu/Language: WHITE OR Estefani Shiwilma / Nnamdi Serna / Elida Stockholm: Payor: Greyson Foy / Plan: Franko Zarate [address] [facility] [address] / Product Type: Commerical /    Prior Admission Data: 7/6/20 [facility] ENDOSCOPY [address] Jara [address]:  Hospital Day: 2 - Admitted 9/2/2020  7:59 AM   POD # * No surgery found *  by * Surgery not found * - Blood Loss: * No surgery found * * Surgery not found *     CONSULTANTS  IP CONSULT TO GASTROENTEROLOGY  IP CONSULT TO GASTROENTEROLOGY  IP CONSULT TO UROLOGY  IP CONSULT TO HOSPITALIST  IP CONSULT TO [facility]     ICD-10-CM ICD-9-CM   1. Intractable nausea and vomiting  R11.2 536.2   2. Elevated bilirubin  R17 277.4   3. Abdominal pain, generalized  R10.84 789.07         Assessment/Plan:       Mild left hydronephrosis  - urinary tract not completely visualized, recommend CT-IVP at this time  - hx of kidney stones. Patient may have recently passed a stone  - monitor kidney function    Case discussed with Dr. Jeromy Adam     CC: Abdominal Pain and Vomiting   HPI: 64yoM. 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 Patient with a recent recurrence of disease in para-aortic soft tissue status post RT, history of A. 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 Pt to ED c/o abdominal pain associated with nausea, vomiting that started about 7 days back.   Patient reports that for the past few days the pain has been getting worse, he has been having increased nausea, multiple bouts of vomiting, poor appetite, not able to tolerate p.o. Patient reports that he thinks he has another bout of pancreatitis. Patient reports that he has been taking oxycodone, tramadol, fentanyl patch and antiemetics at home but they have not been working. Patient reports that today he got concerned because he was not able to tolerate p.o., his epigastric pain persisted and he decided to come to the hospital patient was found to have mildly elevated bilirubin and was requested to be admitted under the hospitalist service. [facility] was consulted regarding a hydronephrosis. This is a known patient of Dr. Kristin Santillan. Last OV note below from 08/18/2020. afvss  WBC: wnl  Hgb: wnl  Cr: 0.67  UA: 20-50 RBCs  Voiding independently  +flomax, avodart, finasteride    CT-IVP: pending    CT abd/pelv w:  IMPRESSION:  Mild left hydronephrosis and delayed nephrogram of uncertain etiology as a  ureteral stone is not visualized. Status post Whipple. Paraceliac soft tissue  abnormality is unchanged compared to the prior exam. Inflammatory changes in the  left anterior pararenal space unchanged.    ==last OV note below from 08/18/2020==      VISIT: Established Patient  CC: \"Prostate issues\"    He has history of BPH on tamsulosin, finasteride initiated 4/2020, prior kidney stones. This is a 4-month follow-up. AUA score 25/mixed, urinalysis is benign. Bladder scan 39 cc postvoid residual.  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 He has been given a favorable outlook. Unfortunately however he has been left with chronic back and abdominal pain and utilize a fentanyl patch. He did not continue finasteride as it seemingly gave him diarrhea.   He notes hesitancy of urination, intermittent stream.  He wondered whether the discomfort in the lower abdominal area could relate to his kidneys. He does have a history of kidney stones. Imaging sought out and CT from May without  abnormalities. I personally viewed the images. Creatinine was 0.76 in January of this year. Problem: Hesitancy, location: Urine, severity: Moderate, duration: Standing, context: Emptying well, alleviating: Despite tamsulosin    PAST MEDICAL HISTORY:    Allergies: PENICILLIN (PENICILLIN V POTASSIUM), * SHELLFISH. DENIES: Latex, X-Ray Dye, Iodine. Medications: DUTASTERIDE 0.5 MG ORAL CAPSULE 1 PO qDay; Route: ORAL, ZYRTEC ALLERGY TABLET prn; Route: ORAL, FINASTERIDE 5 MG ORAL TABLET 1 tab PO q Day; Route: ORAL, RAMIPRIL 10 MG ORAL CAPSULE QPM; Route: ORAL, FENTANYL 25 MCG/HR TRANSDERMAL PATCH 72 HOUR 1 patch every 3 days; Route: TRANSDERMAL, ONDANSETRON 4 MG ORAL TABLET DISINTEGRATING PRN; Route: ORAL, JARDIANCE 25 MG ORAL TABLET QD; Route: ORAL, TRAMADOL HCL 50 MG ORAL TABLET TID; Route: ORAL, TAMSULOSIN HCL 0.4 MG CAPSULE TAKE 1 TABLET BY MOUTH IN THE EVENING; Route: ORAL, ALTEC QPM; Route: ORAL, GABAPENTIN CAPSULE 3 tabs BID; Route: ORAL, ZANTAC 75 TABLET QPM; Route: ORAL. Illnesses: Heart Disease, Bowel Problems, Stroke/Seizure, Kidney Problems, Bleeding Problems, and Cancer. DENIES: Pacemaker/Defibrillator, Lung Disease, Diabetes, High Blood Pressure, HIV, or Hepatitis. Surgeries: Heart Valve Surgery, Gallbladder Surgery, and Other Abdominal Surgery. Family History: DENIES: Prostate cancer, Kidney cancer, Kidney disease, Kidney stones. Social History: Retired. . Smoking status: never smoker. Does not drink alcohol. System Review reviewed 08/18/2020: Admits to: Unexplained Weight Loss. DENIES: Dry Eyes, Dry Mouth, Leg Swelling, Shortness of Breath, Constipation, Involuntary Urine Loss, Lower Extremity Weakness, Dry Skin, Difficulty Walking, Psychiatric Problems, Impaired Sex Drive, Easy Bleeding, Rash. EXAMINATION: Vitals: Pulse: 70 BP: 104/58 Weight: 154 lbs Height: 5' 7\" BMI: 24.21 kg/m^2  Hernia: no inguinal hernia bilaterally       URINALYSIS from Voided specimen  Urine Dip: pH: 6.0, Bld: Trace NH, LE: Neg, Nit: Neg, Prot: +, Ket: Neg, Gluc: 100  Urine Micro: WBC: 0, RBC: 0, Bacteria: 0  Comment: rods and crystals    POST-VOID RESIDUAL  US PVR (2020):  39 ccs     AUA Symptom Score Today: 25/35 (Severe), Quality of Life: Mixed    Prescription(s) Today: DUTASTERIDE 0.5 MG ORAL CAPSULE (DUTASTERIDE) 1 PO qDay  #30[Capsule] x 12,  2020, Tomi Davis MD    IMPRESSION:    1. HISTORY OF KIDNEY STONES (ICD-V13.01) - Resolved: None by CT 2020.     2. WEAK URINARY STREAM (ZGU-528.41): Continuing tamsulosin. He may try doubling up. I doubt the diarrhea was as a result of finasteride however if it was it would have to be a agent specific kind of thing. He elects 6-month trial dutasteride over consideration procedural treatments. 3. SCREENING FOR MALIGNANCY PROSTATE (ICD-V76.44) - New: DENISSE and PSA on return.          cc:Silas Monahan  Transcribed by Speech to Text Technology    Today's Services  Bladder Scan, E&M Service, Urinalysis Microscopic    Upcoming Orders  Return Office Visit - with Tomi Davis MD in 6 months  PSA, UA            Electronically signed by Tomi Davis MD on 2020 at 4:14 PM    ________________________________________________________________________               Temp (24hrs), Av.8 °F (37.1 °C), Min:98.1 °F (36.7 °C), Max:99.8 °F (37.7 °C)    Urinary Status: Voiding, Bathroom privileges  Creatinine   Date/Time Value Ref Range Status   2020 03:04 AM 0.67 (L) 0.70 - 1.30 MG/DL Final   2020 07:54 AM 0.82 0.70 - 1.30 MG/DL Final   2020 03:24 AM 0.76 0.70 - 1.30 MG/DL Final   2020 05:48 AM 0.72 0.70 - 1.30 MG/DL Final   2019 07:37 AM 0.83 0.70 - 1.30 MG/DL Final     Current Antimicrobial Therapy (168h ago, onward)    None Key Anti-Platelet Anticoagulant Meds     The patient is on no antiplatelet meds or anticoagulants. Diet: DIET NPO -       Labs     Lab Results   Component Value Date/Time    Lactic acid 4.5 (HH) 08/25/2017 04:18 AM    Lactic acid 5.2 (HH) 08/25/2017 12:13 AM    WBC 8.0 09/03/2020 03:04 AM    HCT 39.7 09/03/2020 03:04 AM    PLATELET 87 (L) 78/15/3032 03:04 AM    Sodium 138 09/03/2020 03:04 AM    Potassium 3.6 09/03/2020 03:04 AM    Chloride 104 09/03/2020 03:04 AM    CO2 25 09/03/2020 03:04 AM    BUN 14 09/03/2020 03:04 AM    Creatinine 0.67 (L) 09/03/2020 03:04 AM    Glucose 115 (H) 09/03/2020 03:04 AM    Calcium 8.7 09/03/2020 03:04 AM    Magnesium 2.4 12/31/2019 07:37 AM    INR 1.0 12/04/2019 08:21 AM     UA:   Lab Results   Component Value Date/Time    Color DARK YELLOW 09/03/2020 07:05 AM    Appearance CLEAR 09/03/2020 07:05 AM    Specific gravity 1.025 09/03/2020 07:05 AM    Specific gravity 1.027 08/16/2018 08:56 AM    pH (UA) 6.0 09/03/2020 07:05 AM    Protein TRACE (A) 09/03/2020 07:05 AM    Glucose Negative 09/03/2020 07:05 AM    Ketone 40 (A) 09/03/2020 07:05 AM    Bilirubin NEGATIVE  12/31/2019 08:12 AM    Urobilinogen 4.0 (H) 09/03/2020 07:05 AM    Nitrites Negative 09/03/2020 07:05 AM    Leukocyte Esterase TRACE (A) 09/03/2020 07:05 AM    Epithelial cells FEW 09/03/2020 07:05 AM    Bacteria Negative 09/03/2020 07:05 AM    WBC 0-4 09/03/2020 07:05 AM    RBC 20-50 09/03/2020 07:05 AM     Imaging     Results for orders placed during the hospital encounter of 09/02/20   CT ABD PELV W CONT    Narrative EXAM: CT ABD PELV W CONT    INDICATION: abd pain nausea vomiting hx bile duct CA with recurrence, s/p  whipple    COMPARISON: 5/13/2020     CONTRAST: 100 mL of Isovue-370. TECHNIQUE:   Following the uneventful intravenous administration of contrast, thin axial  images were obtained through the abdomen and pelvis. Coronal and sagittal  reconstructions were generated.  Oral contrast was not administered. CT dose  reduction was achieved through use of a standardized protocol tailored for this  examination and automatic exposure control for dose modulation. FINDINGS:   LOWER THORAX: 9 mm groundglass opacity is noted in the right middle lobe. LIVER: Hepatic steatosis again noted. Intrahepatic biliary air persists. BILIARY TREE: Status post cholecystectomy. SPLEEN: within normal limits. PANCREAS: Status post Whipple. ADRENALS: Unremarkable. KIDNEYS: Mild left hydronephrosis and delayed nephrogram is noted of uncertain  etiology. Stable left renal cyst.  STOMACH: Postoperative changes. SMALL BOWEL: No dilatation or wall thickening. COLON: No dilatation or wall thickening. APPENDIX: Unremarkable. PERITONEUM: No ascites or pneumoperitoneum. RETROPERITONEUM: Paraceliac soft tissue density has not changed compared to the  prior examination. Inflammation in the left anterior pararenal space is  unchanged. REPRODUCTIVE ORGANS: Mild prostate enlargement measuring 5.2 cm. URINARY BLADDER: No mass or calculus. BONES: Postoperative changes are seen in the lumbar spine. ABDOMINAL WALL: No mass or hernia. ADDITIONAL COMMENTS: N/A      Impression IMPRESSION:  Mild left hydronephrosis and delayed nephrogram of uncertain etiology as a  ureteral stone is not visualized. Status post Whipple. Paraceliac soft tissue  abnormality is unchanged compared to the prior exam. Inflammatory changes in the  left anterior pararenal space unchanged. US Results (most recent):  Results from [place] encounter on 09/02/20   US ABD LTD    Narrative EXAM: US ABD LTD    INDICATION: Abdominal pain and vomiting. Mild left hydronephrosis on CT. Previous Whipple surgery. COMPARISON: CT abdomen earlier today at 10:00 AM.    TECHNIQUE: Limited abdominal ultrasound. FINDINGS:     Liver: echogenicity is heterogeneous. No focal liver lesion.      Main portal vein flow: Hepatopetal and within normal limits. Fluid: No ascites. Gallbladder: Cholecystectomy. Bile ducts: There is no intra or extrahepatic biliary ductal dilatation. The  common hepatic duct measures 6 mm. Pancreas: The visualized portions are within normal limits. Kidneys: Right length: 13 cm. No hydronephrosis. Impression IMPRESSION:     Normal biliary tree post cholecystectomy and Whipple surgery. Unchanged  heterogeneous liver given difference in technique. Cultures     All Micro Results     None           Past History: (Complete 2+/3 areas)     Allergies   Allergen Reactions    Shellfish Derived Anaphylaxis     Soft shell crabs    Morphine Nausea and Vomiting    Pcn [Penicillins] Other (comments)     Pt stated \"always been told PCN\"  Pt tolerated other cephalosporins in the past      Current Facility-Administered Medications   Medication Dose Route Frequency    sodium chloride (NS) flush 5-40 mL  5-40 mL IntraVENous Q8H    sodium chloride (NS) flush 5-40 mL  5-40 mL IntraVENous PRN    lactated Ringers infusion  100 mL/hr IntraVENous CONTINUOUS    HYDROmorphone (PF) (DILAUDID) injection 1 mg  1 mg IntraVENous Q4H PRN    ondansetron (ZOFRAN) injection 4 mg  4 mg IntraVENous Q4H PRN    heparin (porcine) injection 5,000 Units  5,000 Units SubCUTAneous Q8H    pantoprazole (PROTONIX) 40 mg in 0.9% sodium chloride 10 mL injection  40 mg IntraVENous Q24H    dutasteride (AVODART) capsule 0.5 mg  0.5 mg Oral DAILY    finasteride (PROSCAR) tablet 5 mg  5 mg Oral DAILY    lipase-protease-amylase (CREON 12,000) capsule 6 Cap  6 Cap Oral TID WITH MEALS    tamsulosin (FLOMAX) capsule 0.4 mg  0.4 mg Oral DAILY    Prior to Admission medications    Medication Sig Start Date End Date Taking? Authorizing Provider   loperamide (IMODIUM) 2 mg capsule Take 2-4 mg by mouth as needed for Diarrhea. Give 4 mg after first loose stool. Give an additional 2 mg after each loose stool as needed up to a maximum of 8 doses (16 mg/day). Yes Provider, Historical   lipase-protease-amylase (Creon) 36,000-114,000- 180,000 unit cpDR capsule Take 1 Cap by mouth as needed (for snacks). 2-3 caps each meal and 1 cap for snacks (see additional order)   Yes Provider, Historical   dutasteride (AVODART) 0.5 mg capsule Take 0.5 mg by mouth daily. Yes Provider, Historical   traMADoL (ULTRAM) 50 mg tablet Take 1 Tab by mouth every six (6) hours as needed for Pain for up to 30 days. Max Daily Amount: 200 mg. 8/31/20 9/30/20 Yes Keysha Bridges MD   gabapentin (NEURONTIN) 100 mg capsule Take 3 capsules by mouth twice a day. 8/28/20  Yes Silas Raya III, DO   fentaNYL 37.5 mcg/hour pt72 37.5 mcg by TransDERmal route every seventy-two (72) hours for 30 days. Max Daily Amount: 37.5 mcg. 9/18/20 10/18/20 Yes Keysha Bridges MD   empagliflozin (Jardiance) 25 mg tablet Take 1 Tab by mouth daily. Patient taking differently: Take 25 mg by mouth nightly. 8/17/20  Yes Silas Raya III, DO   cyanocobalamin (VITAMIN B12) 1,000 mcg/mL injection INJECT CONTENTS OF ONE VIAL INTRAMUSCULARLY EVERY OTHER WEEK 7/15/20  Yes Silas Raya III, DO   Creon 36,000-114,000- 180,000 unit cpDR capsule Take 2-3 Caps by mouth three (3) times daily (with meals). 2-3 caps each meal and 1 cap for snacks (see additional order) 4/10/20  Yes Provider, Historical   finasteride (PROSCAR) 5 mg tablet TAKE 1 TABLET BY MOUTH EVERY DAY 4/13/20  Yes Provider, Historical   ramipriL (ALTACE) 10 mg capsule TAKE 1 CAPSULE BY MOUTH EVERY DAY AT NIGHT 3/29/20  Yes Silas Raya III, DO   ondansetron (ZOFRAN ODT) 4 mg disintegrating tablet Take 1 Tab by mouth every eight (8) hours as needed for Nausea. 2/24/20  Yes Silas Raya III, DO   acetaminophen (TYLENOL) 325 mg tablet Take 2 Tabs by mouth every four (4) hours as needed for Pain or Fever (Over the counter medication).  1/2/20  Yes Etheleen Right, NP   sucralfate (CARAFATE) 1 gram tablet Take 1 g by mouth four (4) times daily. Yes Provider, Historical   omeprazole (PRILOSEC) 40 mg capsule Take 1 Cap by mouth daily. 11/5/19  Yes Greta Cottrell MD   sildenafil citrate (VIAGRA) 50 mg tablet Take 1 Tab by mouth as needed (ED). 5/6/19  Yes Silas Raya III, DO   tamsulosin (FLOMAX) 0.4 mg capsule TAKE ONE CAPSULE BY MOUTH EVERY EVENING 2/28/19  Yes Provider, Historical   alpha-D-galactosidase (BEANO PO) Take 1 Tab by mouth two (2) times a day. Yes Other, MD Flynn   cetirizine (ZYRTEC) 10 mg tablet Take 10 mg by mouth nightly. Yes Provider, Historical        PMHx:  has a past medical history of Arrhythmia, Autoimmune disease ([facility] [address] Cancer ([facility] [address] Diabetes ([facility] [address] Family history of skin cancer, Hypertension, Sepsis ([facility] [address] (2017), Status post chemotherapy, Stroke ([facility] [address] (2008), Sun-damaged skin, and Sunburn, blistering. He also has no past medical history of Adverse effect of anesthesia, Arsenic suspected exposure, Skin cancer, Sleep apnea, or Tanning bed exposure. PSurgHx:  has a past surgical history that includes hx heart catheterization (2005); neurological procedure unlisted (2005); hx other surgical (11/07/2017); hx vascular access (2017); hx gi; hx gi (10/06/2017); and hx orthopaedic (2000). PSocHx:  reports that he has never smoked. He has never used smokeless tobacco. He reports that he does not drink alcohol or use drugs.    ROS:  (Complete - 10 systems) - DENIES: Weightloss (Constitutional), Dry mouth (ENMT), Chest pain (CV), SOB (Respiratory), Constipation (GI), Weakness (MS), Pallor (Skin), TIA Sx (Neuro), Confusion (Psych), Easy bruising (Heme)    Physical Exam: (Comprehesive - 8+ 1995 Systems)     (1) Constitutional:  FIO2:   on SpO2: O2 Sat (%): 96 %  O2 Device: Room air    Patient Vitals for the past 24 hrs:   BP Temp Pulse Resp SpO2 Height Weight   09/03/20 0846 115/70 98.1 °F (36.7 °C) 84 18 96 %     09/03/20 0259 107/65 99.8 °F (37.7 °C) 82 18 94 %     09/03/20 0246 107/66 98.7 °F (37.1 °C) 83 16 96 %     09/02/20 2053 109/66 98.5 °F (36.9 °C) 86 16 97 %     09/02/20 1930 105/72    95 %     09/02/20 1900 121/70    95 %     09/02/20 1830 116/67    96 %     09/02/20 1800 113/65    95 %     09/02/20 1130 109/69  95 16 95 % 5' 8\" (1.727 m) 68 kg (149 lb 14.6 oz)                                                             Signed By: Mike Terrell, NIKI  - September 3, 2020

## 2020-09-04 LAB
25(OH)D3 SERPL-MCNC: 32.9 NG/ML (ref 30–100)
ALBUMIN SERPL-MCNC: 2.4 G/DL (ref 3.5–5)
ALBUMIN/GLOB SERPL: 1 {RATIO} (ref 1.1–2.2)
ALP SERPL-CCNC: 138 U/L (ref 45–117)
ALT SERPL-CCNC: 31 U/L (ref 12–78)
ANION GAP SERPL CALC-SCNC: 6 MMOL/L (ref 5–15)
AST SERPL-CCNC: 12 U/L (ref 15–37)
BASOPHILS # BLD: 0 K/UL (ref 0–0.1)
BASOPHILS NFR BLD: 0 % (ref 0–1)
BILIRUB DIRECT SERPL-MCNC: 0.8 MG/DL (ref 0–0.2)
BILIRUB INDIRECT SERPL-MCNC: 1.9 MG/DL (ref 0–1.1)
BILIRUB SERPL-MCNC: 2.7 MG/DL (ref 0.2–1)
BILIRUB SERPL-MCNC: 2.9 MG/DL (ref 0.2–1)
BUN SERPL-MCNC: 15 MG/DL (ref 6–20)
BUN/CREAT SERPL: 22 (ref 12–20)
CALCIUM SERPL-MCNC: 8.2 MG/DL (ref 8.5–10.1)
CHLORIDE SERPL-SCNC: 101 MMOL/L (ref 97–108)
CO2 SERPL-SCNC: 27 MMOL/L (ref 21–32)
COMMENT, HOLDF: NORMAL
CREAT SERPL-MCNC: 0.68 MG/DL (ref 0.7–1.3)
DIFFERENTIAL METHOD BLD: ABNORMAL
EOSINOPHIL # BLD: 0 K/UL (ref 0–0.4)
EOSINOPHIL NFR BLD: 0 % (ref 0–7)
ERYTHROCYTE [DISTWIDTH] IN BLOOD BY AUTOMATED COUNT: 12.5 % (ref 11.5–14.5)
GLOBULIN SER CALC-MCNC: 2.4 G/DL (ref 2–4)
GLUCOSE SERPL-MCNC: 103 MG/DL (ref 65–100)
HCT VFR BLD AUTO: 35.1 % (ref 36.6–50.3)
HGB BLD-MCNC: 11.8 G/DL (ref 12.1–17)
IMM GRANULOCYTES # BLD AUTO: 0 K/UL (ref 0–0.04)
IMM GRANULOCYTES NFR BLD AUTO: 0 % (ref 0–0.5)
LYMPHOCYTES # BLD: 0.3 K/UL (ref 0.8–3.5)
LYMPHOCYTES NFR BLD: 5 % (ref 12–49)
MCH RBC QN AUTO: 31.9 PG (ref 26–34)
MCHC RBC AUTO-ENTMCNC: 33.6 G/DL (ref 30–36.5)
MCV RBC AUTO: 94.9 FL (ref 80–99)
MONOCYTES # BLD: 0.5 K/UL (ref 0–1)
MONOCYTES NFR BLD: 10 % (ref 5–13)
NEUTS SEG # BLD: 4.3 K/UL (ref 1.8–8)
NEUTS SEG NFR BLD: 85 % (ref 32–75)
NRBC # BLD: 0 K/UL (ref 0–0.01)
NRBC BLD-RTO: 0 PER 100 WBC
PLATELET # BLD AUTO: 75 K/UL (ref 150–400)
PMV BLD AUTO: 11.5 FL (ref 8.9–12.9)
POTASSIUM SERPL-SCNC: 3.7 MMOL/L (ref 3.5–5.1)
PROT SERPL-MCNC: 4.8 G/DL (ref 6.4–8.2)
RBC # BLD AUTO: 3.7 M/UL (ref 4.1–5.7)
RBC MORPH BLD: ABNORMAL
RBC MORPH BLD: ABNORMAL
SAMPLES BEING HELD,HOLD: NORMAL
SODIUM SERPL-SCNC: 134 MMOL/L (ref 136–145)
WBC # BLD AUTO: 5.1 K/UL (ref 4.1–11.1)

## 2020-09-04 PROCEDURE — 74011250637 HC RX REV CODE- 250/637: Performed by: INTERNAL MEDICINE

## 2020-09-04 PROCEDURE — 99223 1ST HOSP IP/OBS HIGH 75: CPT | Performed by: INTERNAL MEDICINE

## 2020-09-04 PROCEDURE — 84446 ASSAY OF VITAMIN E: CPT

## 2020-09-04 PROCEDURE — 36415 COLL VENOUS BLD VENIPUNCTURE: CPT

## 2020-09-04 PROCEDURE — 85025 COMPLETE CBC W/AUTO DIFF WBC: CPT

## 2020-09-04 PROCEDURE — 74011250636 HC RX REV CODE- 250/636: Performed by: FAMILY MEDICINE

## 2020-09-04 PROCEDURE — 82248 BILIRUBIN DIRECT: CPT

## 2020-09-04 PROCEDURE — 65270000029 HC RM PRIVATE

## 2020-09-04 PROCEDURE — 99218 HC RM OBSERVATION: CPT

## 2020-09-04 PROCEDURE — 74011250636 HC RX REV CODE- 250/636: Performed by: INTERNAL MEDICINE

## 2020-09-04 PROCEDURE — 80053 COMPREHEN METABOLIC PANEL: CPT

## 2020-09-04 PROCEDURE — 84590 ASSAY OF VITAMIN A: CPT

## 2020-09-04 PROCEDURE — 74011250637 HC RX REV CODE- 250/637: Performed by: FAMILY MEDICINE

## 2020-09-04 PROCEDURE — 96376 TX/PRO/DX INJ SAME DRUG ADON: CPT

## 2020-09-04 PROCEDURE — 96372 THER/PROPH/DIAG INJ SC/IM: CPT

## 2020-09-04 PROCEDURE — 82306 VITAMIN D 25 HYDROXY: CPT

## 2020-09-04 PROCEDURE — C9113 INJ PANTOPRAZOLE SODIUM, VIA: HCPCS | Performed by: FAMILY MEDICINE

## 2020-09-04 PROCEDURE — 74011000250 HC RX REV CODE- 250: Performed by: FAMILY MEDICINE

## 2020-09-04 RX ORDER — AMOXICILLIN 250 MG
2 CAPSULE ORAL DAILY
Status: DISCONTINUED | OUTPATIENT
Start: 2020-09-05 | End: 2020-09-09

## 2020-09-04 RX ORDER — HYDROMORPHONE HYDROCHLORIDE 2 MG/1
2-4 TABLET ORAL
Status: DISCONTINUED | OUTPATIENT
Start: 2020-09-04 | End: 2020-09-11 | Stop reason: HOSPADM

## 2020-09-04 RX ORDER — HEPARIN SODIUM 5000 [USP'U]/ML
5000 INJECTION, SOLUTION INTRAVENOUS; SUBCUTANEOUS EVERY 12 HOURS
Status: DISCONTINUED | OUTPATIENT
Start: 2020-09-05 | End: 2020-09-11 | Stop reason: HOSPADM

## 2020-09-04 RX ORDER — POLYETHYLENE GLYCOL 3350 17 G/17G
17 POWDER, FOR SOLUTION ORAL DAILY
Status: DISCONTINUED | OUTPATIENT
Start: 2020-09-05 | End: 2020-09-09

## 2020-09-04 RX ORDER — FENTANYL 25 UG/1
1 PATCH TRANSDERMAL
Status: DISCONTINUED | OUTPATIENT
Start: 2020-09-04 | End: 2020-09-11 | Stop reason: HOSPADM

## 2020-09-04 RX ORDER — FENTANYL 12.5 UG/1
1 PATCH TRANSDERMAL
Status: DISCONTINUED | OUTPATIENT
Start: 2020-09-04 | End: 2020-09-11 | Stop reason: HOSPADM

## 2020-09-04 RX ADMIN — HYDROMORPHONE HYDROCHLORIDE 2 MG: 1 INJECTION, SOLUTION INTRAMUSCULAR; INTRAVENOUS; SUBCUTANEOUS at 21:23

## 2020-09-04 RX ADMIN — HEPARIN SODIUM 5000 UNITS: 5000 INJECTION INTRAVENOUS; SUBCUTANEOUS at 06:59

## 2020-09-04 RX ADMIN — HYDROMORPHONE HYDROCHLORIDE 2 MG: 2 TABLET ORAL at 15:00

## 2020-09-04 RX ADMIN — HYDROMORPHONE HYDROCHLORIDE 2 MG: 1 INJECTION, SOLUTION INTRAMUSCULAR; INTRAVENOUS; SUBCUTANEOUS at 08:40

## 2020-09-04 RX ADMIN — Medication 10 ML: at 06:59

## 2020-09-04 RX ADMIN — PANCRELIPASE 6 CAPSULE: 60000; 12000; 38000 CAPSULE, DELAYED RELEASE PELLETS ORAL at 12:56

## 2020-09-04 RX ADMIN — HEPARIN SODIUM 5000 UNITS: 5000 INJECTION INTRAVENOUS; SUBCUTANEOUS at 15:01

## 2020-09-04 RX ADMIN — Medication 10 ML: at 11:50

## 2020-09-04 RX ADMIN — PANCRELIPASE 6 CAPSULE: 60000; 12000; 38000 CAPSULE, DELAYED RELEASE PELLETS ORAL at 17:20

## 2020-09-04 RX ADMIN — Medication 10 ML: at 15:02

## 2020-09-04 RX ADMIN — ONDANSETRON 4 MG: 2 INJECTION INTRAMUSCULAR; INTRAVENOUS at 12:56

## 2020-09-04 RX ADMIN — HYDROMORPHONE HYDROCHLORIDE 2 MG: 1 INJECTION, SOLUTION INTRAMUSCULAR; INTRAVENOUS; SUBCUTANEOUS at 18:06

## 2020-09-04 RX ADMIN — ONDANSETRON 4 MG: 2 INJECTION INTRAMUSCULAR; INTRAVENOUS at 17:16

## 2020-09-04 RX ADMIN — ONDANSETRON 4 MG: 2 INJECTION INTRAMUSCULAR; INTRAVENOUS at 08:40

## 2020-09-04 RX ADMIN — DUTASTERIDE 0.5 MG: 0.5 CAPSULE, LIQUID FILLED ORAL at 08:41

## 2020-09-04 RX ADMIN — Medication 10 ML: at 21:23

## 2020-09-04 RX ADMIN — ONDANSETRON 4 MG: 2 INJECTION INTRAMUSCULAR; INTRAVENOUS at 21:23

## 2020-09-04 RX ADMIN — ONDANSETRON 4 MG: 2 INJECTION INTRAMUSCULAR; INTRAVENOUS at 03:34

## 2020-09-04 RX ADMIN — SODIUM CHLORIDE 40 MG: 9 INJECTION INTRAMUSCULAR; INTRAVENOUS; SUBCUTANEOUS at 15:00

## 2020-09-04 RX ADMIN — PANCRELIPASE 6 CAPSULE: 60000; 12000; 38000 CAPSULE, DELAYED RELEASE PELLETS ORAL at 08:00

## 2020-09-04 RX ADMIN — TAMSULOSIN HYDROCHLORIDE 0.4 MG: 0.4 CAPSULE ORAL at 08:41

## 2020-09-04 RX ADMIN — HYDROMORPHONE HYDROCHLORIDE 2 MG: 1 INJECTION, SOLUTION INTRAMUSCULAR; INTRAVENOUS; SUBCUTANEOUS at 04:39

## 2020-09-04 RX ADMIN — FINASTERIDE 5 MG: 5 TABLET, FILM COATED ORAL at 08:41

## 2020-09-04 RX ADMIN — HYDROMORPHONE HYDROCHLORIDE 2 MG: 1 INJECTION, SOLUTION INTRAMUSCULAR; INTRAVENOUS; SUBCUTANEOUS at 01:14

## 2020-09-04 RX ADMIN — HYDROMORPHONE HYDROCHLORIDE 2 MG: 1 INJECTION, SOLUTION INTRAMUSCULAR; INTRAVENOUS; SUBCUTANEOUS at 11:50

## 2020-09-04 RX ADMIN — HYDROMORPHONE HYDROCHLORIDE 2 MG: 2 TABLET ORAL at 17:16

## 2020-09-04 RX ADMIN — SODIUM CHLORIDE, SODIUM LACTATE, POTASSIUM CHLORIDE, AND CALCIUM CHLORIDE 100 ML/HR: 600; 310; 30; 20 INJECTION, SOLUTION INTRAVENOUS at 00:21

## 2020-09-04 NOTE — PROGRESS NOTES
[facility] [facility] Adult  Hospitalist Group                                                                                          Hospitalist Progress Note  Yoav Regalado MD  Answering service: [phone] from in house phone        Date of Service:  2020  NAME:  Obi Forte. :  1955  MRN:  [id number]      Admission Summary:     Patient reports that he started experiencing abdominal pain associated with nausea, vomiting that started about 6 days back. Patient reports that for the past few days the pain has been getting worse, he has been having increased nausea, multiple bouts of vomiting, poor appetite, not able to tolerate p.o. Patient reports that he thinks he has another bout of pancreatitis. Patient reports that he has been taking oxycodone, tramadol, fentanyl patch and antiemetics at home but they have not been working. Patient reports that today he got concerned because he was not able to tolerate p.o., his epigastric pain persisted and he decided to come to the hospital patient was found to have mildly elevated bilirubin and was requested to be admitted under the hospitalist service. Interval history / Subjective:       Patient still with abdominal pain in the lower abdomen, epigastric area that radiates to the back.      Assessment & Plan:     Abdominal pain  -Acute onset abdominal pain, CT without acute pathology  -History of chronic pancreatitis secondary to pancreatic duct stricture, prior stent in the duct was removed in 2020  -GI following, for EUS guided plexus block on 2020  -General surgery evaluating the patient, plan for revision of Egorge-en-Y next week    Abnormal LFTs  -Elevated alk phos and T bili, normal transaminases  -MRCP shows no issues with biliary ducts    Hydronephrosis  -Left hydronephrosis, but no other obstruction noted on CT  -MRCP showing left renal vein occlusion with retroperitoneal edema  -Urology following    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  -Following oncology as outpatient, currently with radiation therapy    BPH  -Continue home medications    Code status: FULL  DVT prophylaxis: Heparin    Care Plan discussed with: Patient/Family  Anticipated Disposition: Home w/Family  Anticipated Discharge: Greater than 48 hours     Hospital Problems  Date Reviewed: 7/6/2020          Codes Class Noted POA    Nausea & vomiting ICD-10-CM: R11.2  ICD-9-CM: 787.01  9/2/2020 Unknown                Review of Systems:   A comprehensive review of systems was negative except for that written in the HPI. Vital Signs:    Last 24hrs VS reviewed since prior progress note. Most recent are:  Visit Vitals  BP 99/68 (BP 1 Location: Right arm, BP Patient Position: Sitting)   Pulse 89   Temp 98 °F (36.7 °C)   Resp 16   Ht 5' 8\" (1.727 m)   Wt 68.4 kg (150 lb 11.2 oz)   SpO2 98%   BMI 22.91 kg/m²         Intake/Output Summary (Last 24 hours) at 9/4/2020 1307  Last data filed at 9/4/2020 0911  Gross per 24 hour   Intake 530 ml   Output    Net 530 ml        Physical Examination:             Constitutional:  No acute distress, cooperative, pleasant    ENT:  Oral mucosa moist, oropharynx benign. Resp:  CTA bilaterally. No wheezing/rhonchi/rales. No accessory muscle use   CV:  Regular rhythm, normal rate, no murmurs, gallops, rubs    GI:  Soft, non distended, non tender. normoactive bowel sounds, no hepatosplenomegaly     Musculoskeletal:  No edema, warm, 2+ pulses throughout    Neurologic:  Moves all extremities.   AAOx3, CN II-XII reviewed     Skin:  Good turgor, no rashes or ulcers       Data Review:    Review and/or order of clinical lab test      Labs:     Recent Labs     09/04/20  0353 09/03/20  0304   WBC 5.1 8.0   HGB 11.8* 13.3   HCT 35.1* 39.7   PLT 75* 87*     Recent Labs     09/04/20  0353 09/03/20  0304 09/02/20  0754   * 138 137   K 3.7 3.6 3.9    104 104   CO2 27 25 27   BUN 15 14 19   CREA 0.68* 0.67* 0.82   * 115* 106*   CA 8.2* 8.7 9.0     Recent Labs     09/04/20  0353 09/03/20  0304 09/02/20  0754   ALT 31 44 62   * 160* 215*   TBILI 2.9* 3.5* 2.7*   TP 4.8* 5.6* 6.5   ALB 2.4* 2.9* 3.6   GLOB 2.4 2.7 2.9   LPSE  --   --  32*     No results for input(s): INR, PTP, APTT, INREXT, INREXT in the last 72 hours. No results for input(s): FE, TIBC, PSAT, FERR in the last 72 hours. No results found for: FOL, RBCF   No results for input(s): PH, PCO2, PO2 in the last 72 hours. No results for input(s): CPK, CKNDX, TROIQ in the last 72 hours.     No lab exists for component: CPKMB  Lab Results   Component Value Date/Time    Cholesterol, total 81 09/02/2020 07:54 AM    HDL Cholesterol 49 09/02/2020 07:54 AM    LDL, calculated 22.6 09/02/2020 07:54 AM    Triglyceride 47 09/02/2020 07:54 AM    CHOL/HDL Ratio 1.7 09/02/2020 07:54 AM     Lab Results   Component Value Date/Time    Glucose (POC) 120 (H) 01/02/2020 11:47 AM    Glucose (POC) 124 (H) 01/02/2020 06:48 AM    Glucose (POC) 109 (H) 01/01/2020 10:04 PM    Glucose (POC) 204 (H) 01/01/2020 05:09 PM    Glucose (POC) 129 (H) 01/01/2020 11:52 AM     Lab Results   Component Value Date/Time    Color DARK YELLOW 09/03/2020 07:05 AM    Appearance CLEAR 09/03/2020 07:05 AM    Specific gravity 1.025 09/03/2020 07:05 AM    Specific gravity 1.027 08/16/2018 08:56 AM    pH (UA) 6.0 09/03/2020 07:05 AM    Protein TRACE (A) 09/03/2020 07:05 AM    Glucose Negative 09/03/2020 07:05 AM    Ketone 40 (A) 09/03/2020 07:05 AM    Bilirubin NEGATIVE  12/31/2019 08:12 AM    Urobilinogen 4.0 (H) 09/03/2020 07:05 AM    Nitrites Negative 09/03/2020 07:05 AM    Leukocyte Esterase TRACE (A) 09/03/2020 07:05 AM    Epithelial cells FEW 09/03/2020 07:05 AM    Bacteria Negative 09/03/2020 07:05 AM    WBC 0-4 09/03/2020 07:05 AM    RBC 20-50 09/03/2020 07:05 AM         Medications Reviewed:     Current Facility-Administered Medications   Medication Dose Route Frequency    HYDROmorphone (PF) (DILAUDID) injection 2 mg  2 mg IntraVENous Q3H PRN    sodium chloride (NS) flush 5-40 mL  5-40 mL IntraVENous Q8H    sodium chloride (NS) flush 5-40 mL  5-40 mL IntraVENous PRN    ondansetron (ZOFRAN) injection 4 mg  4 mg IntraVENous Q4H PRN    heparin (porcine) injection 5,000 Units  5,000 Units SubCUTAneous Q8H    pantoprazole (PROTONIX) 40 mg in 0.9% sodium chloride 10 mL injection  40 mg IntraVENous Q24H    dutasteride (AVODART) capsule 0.5 mg  0.5 mg Oral DAILY    finasteride (PROSCAR) tablet 5 mg  5 mg Oral DAILY    lipase-protease-amylase (CREON 12,000) capsule 6 Cap  6 Cap Oral TID WITH MEALS    tamsulosin (FLOMAX) capsule 0.4 mg  0.4 mg Oral DAILY     ______________________________________________________________________  EXPECTED LENGTH OF STAY: - - -  ACTUAL LENGTH OF STAY:          0                 Esther Rothman MD

## 2020-09-04 NOTE — PROGRESS NOTES
EVERTON:  1. General surgery following, plan for surgical revision next week. 2. Urology following. 3. Home with wife when stable.       Blaine Jimenez [facility]

## 2020-09-04 NOTE — PROGRESS NOTES
Physician Progress Note      See Marvin  CSN #:                  [id number]  :                       1955  ADMIT DATE:       2020 7:59 AM  DISCH DATE:  RESPONDING  PROVIDER #:        Naila Bueno MD          QUERY TEXT:    Pt admitted with abdominal pain  Noted documentation of severe protein calorie malnutrition by nutritional consultant. If possible, please document in progress notes and discharge summary:      The medical record reflects the following:  Risk Factors: weight loss  Clinical Indicators:  recently with 15# (10%) wt loss x   8 months. Malnutrition Assessment:  Malnutrition Status:  Severe malnutrition  Context:  Acute illness  Findings of the 6 clinical characteristics of malnutrition:  Energy Intake:  1 - 75% or less of est energy req for 7 or more days  Weight Loss:  Unable to assess  Body Fat Loss:  7 - Moderate body fat loss, Triceps, Orbital  Muscle Mass Loss:  7 - Moderate muscle mass loss, Scapula (trapezius)  Fluid Accumulation:  Unable to assess,   Strength:  Not performed  Nutrition Diagnosis:  ? Unintended weight loss, Inadequate protein-energy intake, Severe malnutrition related to increased demand for energy/nutrients, altered GI function as evidenced by weight loss, poor intake prior to admission, nausea, vomiting(pancreatic CA)  ? ?  Impaired nutrient utilization related to altered GI function as evidenced by diarrhea(pancreatic CA w/ PERT)    Treatment: nutritional consult, Zofran prn, protonix IX, creon po, GI consult    Thank you,  Consuelo Burger RN, CCDS Clinical   [phone]  Options provided:  -- Severe Protein Calorie Malnutrition confirmed POA  -- Severe Protein Calorie Malnutrition  ruled out  -- Other - I will add my own diagnosis  -- Disagree - Not applicable / Not valid  -- Disagree - Clinically unable to determine / Unknown  -- Refer to Clinical Documentation Reviewer    PROVIDER RESPONSE TEXT:    The diagnosis of Severe Protein Calorie Malnutrition was confirmed as POA.     Query created by: David Alcala on 9/4/2020 3:35 PM      Electronically signed by:  Didi Bakns MD 9/4/2020 4:27 PM

## 2020-09-04 NOTE — CONSULTS
Palliative Medicine Consult  Jesus: [phone])    Patient Name: Watson Sterling. YOB: 1955    Date of Initial Consult: 9/4/2020  Reason for Consult: Pain management  Requesting Provider: Dr. Gann Cheri  Primary Care Physician: Robert Pena DO     SUMMARY:   Tim Babin is a 62y.o. 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 His chronic back pain has been controlled with fentanyl patch 37.5 mcg every 72 hours. 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 This is second marriage for both of them. He is now admitted with intractable bilateral flank pain, biliary emesis and nausea. MRI shows renal vein thrombosis     Current hospitalization-plan for celiac plexus block. Reversal of Whipple procedure planned for next Wednesday-9/9/2020       PALLIATIVE DIAGNOSES:   1. Bilateral flank pain   2. chronic low back pain- on fentanyl 37.5 mcg patch plus tramadol 3 tablets/day  3. Chronic nausea and vomiting       PLAN:   1. New bilateral flank pain-  MRI shows renal vein thrombosis. He is currently on IV Dilaudid with moderate relief. Received 3 doses of IV Dilaudid 2 mg.   - Given patient is planned for reversal of Whipple next Wednesday, restart p.o.  Dilaudid to his not only dependent on IV opioids for pain medication.   -Start Dilaudid p.o. 2 to 4 mg every 4 hours as needed.   -Chronic back pain-patient has been on fentanyl 37.5 mcg patch every 72 hours with good relief. His patch was removed on Tuesday. This needs to be restarted in order to help with his back pain and preferably decrease his need for IV pain medication. Given that we do not have 37.5 mcg patch in our formulary, I am placing an order for 25 mcg +12 mcg patch.    -He is getting a celiac plexus block    2. Constipation-he tends to struggle with diarrhea most of the time and then severe constipation. Important to prevent constipation with increasing opioids and flank pain. Please start Zeny-Colace 2 tablets daily and MiraLAX as needed. Okay to hold if patient is having loose stools or diarrhea. 3.  Intractable nausea- On IV Zofran with good relief. 4. Patient will remain in hospital for the next several days, plan for reversal of Whipple next Wednesday. Our team will follow to help with pain control. Please call palliative medicine at [phone] should patient have uncontrolled pain. 5. iInitial consult note routed to primary continuity provider and/or primary health care team members  6.  Communicated plan of care with: Palliative [facility] Team     GOALS OF CARE / TREATMENT PREFERENCES:     GOALS OF CARE:  Patient/Health Care Proxy Stated Goals: Cure    TREATMENT PREFERENCES:   Code Status: Full Code    Advance Care Planning:  [x] The [facility] Interdisciplinary Team has updated the ACP Navigator with Health Care Decision Maker and Patient Capacity      Primary Decision MakerPennie Brewster - [phone]    Secondary Decision Maker: Ridge Cristina III - Child - [phone]    Supplemental (Other) Decision Maker: Maik Aleta Child - [phone]  Advance Care Planning 8/26/2020   Patient's Healthcare Decision Maker is: Named in scanned ACP document   Primary Decision Maker Name -   Primary Decision Maker Phone Number -   Primary Decision Maker Relationship to Patient -   Confirm Advance Directive Yes, on file   Patient Would Like to Complete Advance Directive -   Does the patient have other document types -       Medical Interventions: Full interventions     Other Instructions:   Artificially Administered Nutrition: No feeding tube     Other:    As far as possible, the palliative care team has discussed with patient / health care proxy about goals of care / treatment preferences for patient. HISTORY:     History obtained from: Patient    CHIEF COMPLAINT: Flank pain    HPI/SUBJECTIVE:    The patient is:   [x] Verbal and participatory  [] Non-participatory due to:     Patient well-known to me, seen at bedside along with his wife. He talked about the days leading to current admission. He  has been struggling with increasing back pain and upper abdominal pain with increasing vomiting over the last 2 weeks but 2 days prior to admission, he developed severe bilateral flank pain which is new for him, difficulty urination, vomiting and severe nausea. We discussed imaging results so far, he is happy that he gets dizzy in the hospital until the Whipple procedure scheduled.     Clinical Pain Assessment (nonverbal scale for severity on nonverbal patients):   Clinical Pain Assessment  Severity: 6  Location: Bilateral flank and upper abdomen and back  Character: Aching, cramping  Duration: Days  Effect: Functional and emotional  Factors: None in particular  Frequency: Constant          Duration: for how long has pt been experiencing pain (e.g., 2 days, 1 month, years)  Frequency: how often pain is an issue (e.g., several times per day, once every few days, constant)     FUNCTIONAL ASSESSMENT:     Palliative Performance Scale (PPS):  PPS: 70       PSYCHOSOCIAL/SPIRITUAL SCREENING:     Palliative IDT has assessed this patient for cultural preferences / practices and a referral made as appropriate to needs (Cultural Services, Patient Advocacy, Ethics, etc.)    Any spiritual / Sabianism concerns:  [] Yes /  [x] No    Caregiver Burnout:  [] Yes /  [x] No /  [] No Caregiver Present Anticipatory grief assessment:   [x] Normal  / [] Maladaptive       ESAS Anxiety: Anxiety: 0    ESAS Depression: Depression: 0        REVIEW OF SYSTEMS:     Positive and pertinent negative findings in ROS are noted above in HPI. The following systems were [x] reviewed / [] unable to be reviewed as noted in HPI  Other findings are noted below. Systems: constitutional, ears/nose/mouth/throat, respiratory, gastrointestinal, genitourinary, musculoskeletal, integumentary, neurologic, psychiatric, endocrine. Positive findings noted below. Modified ESAS Completed by: provider   Fatigue: 3 Drowsiness: 0   Depression: 0 Pain: 6   Anxiety: 0 Nausea: 4   Anorexia: 0 Dyspnea: 0     Constipation: No              PHYSICAL EXAM:     From RN flowsheet:  Wt Readings from Last 3 Encounters:   09/04/20 150 lb 11.2 oz (68.4 kg)   09/03/20 145 lb (65.8 kg)   07/06/20 150 lb (68 kg)     Blood pressure 99/68, pulse 89, temperature 98 °F (36.7 °C), resp. rate 16, height 5' 8\" (1.727 m), weight 150 lb 11.2 oz (68.4 kg), SpO2 98 %.     Pain Scale 1: Numeric (0 - 10)  Pain Intensity 1: 7  Pain Onset 1: last night  Pain Location 1: Abdomen  Pain Orientation 1: Lower, Mid  Pain Description 1: Aching  Pain Intervention(s) 1: Medication (see MAR)  Last bowel movement, if known:     Constitutional: Alert, oriented  Eyes: pupils equal, anicteric  ENMT: no nasal discharge, moist mucous membranes  Cardiovascular: regular rhythm, distal pulses intact  Respiratory: breathing not labored, symmetric  Gastrointestinal: soft,+bowel sounds, tenderness with palpation of lower abdomen  Musculoskeletal: no deformity, no tenderness to palpation  Skin: warm, dry  Neurologic: following commands, moving all extremities  Psychiatric: full affect, no hallucinations  Other:       HISTORY:     Active Problems:    Nausea & vomiting (9/2/2020)      Past Medical History:   Diagnosis Date    Arrhythmia     Previous a.fib, ablation, NSR now; NO LONGER FOLLOWED BY CARDIOLOGIST.  Autoimmune disease ([facility] [address]     SJOGREN'S    Cancer ([facility] [address]     COMMON BILE DUCT, ADENOCARCINOMA    Diabetes ([facility] [address]     Family history of skin cancer     Hypertension     Sepsis ([facility] [address] 2017    STENTING TO BILE DUCT    Status post chemotherapy     Stroke [facility]) 2008    brain aneurysm - no deficits    Sun-damaged skin     Sunburn, blistering       Past Surgical History:   Procedure Laterality Date    HX GI      COLONOSCOPY, POLYPS (BENIGN)    HX GI  10/06/2017    Viktor [facility]     [address]  2005    Ablation     HX ORTHOPAEDIC  2000    Spinal fusion W/ HARDWARE    HX OTHER SURGICAL  11/07/2017    [place] Cath, Dr. Kristen Botello  2017    PORTACATH - then removed    NEUROLOGICAL PROCEDURE UNLISTED  2005    Dorinda Benders hole washout from cerebral hemorrhage      Family History   Problem Relation Age of Onset    Cancer Father         Breast and Colon    Cancer Mother         LEUKEMIA    No Known Problems Sister     No Known Problems Brother     No Known Problems Sister     Anesth Problems Neg Hx       History reviewed, no pertinent family history.   Social History     Tobacco Use    Smoking status: Never Smoker    Smokeless tobacco: Never Used   Substance Use Topics    Alcohol use: Never     Frequency: Never     Comment: very rare     Allergies   Allergen Reactions    Shellfish Derived Anaphylaxis     Soft shell crabs    Morphine Nausea and Vomiting    Pcn [Penicillins] Other (comments)     Pt stated \"always been told PCN\"  Pt tolerated other cephalosporins in the past      Current Facility-Administered Medications   Medication Dose Route Frequency    fentaNYL (DURAGESIC) 12 mcg/hr patch 1 Patch  1 Patch TransDERmal Q72H    fentaNYL (DURAGESIC) 25 mcg/hr patch 1 Patch  1 Patch TransDERmal Q72H    HYDROmorphone (DILAUDID) tablet 2-4 mg  2-4 mg Oral Q4H PRN    [START ON 9/5/2020] senna-docusate (PERICOLACE) 8.6-50 mg per tablet 2 Tab  2 Tab Oral DAILY  [START ON 9/5/2020] polyethylene glycol (MIRALAX) packet 17 g  17 g Oral DAILY    HYDROmorphone (PF) (DILAUDID) injection 2 mg  2 mg IntraVENous Q3H PRN    sodium chloride (NS) flush 5-40 mL  5-40 mL IntraVENous Q8H    sodium chloride (NS) flush 5-40 mL  5-40 mL IntraVENous PRN    ondansetron (ZOFRAN) injection 4 mg  4 mg IntraVENous Q4H PRN    heparin (porcine) injection 5,000 Units  5,000 Units SubCUTAneous Q8H    pantoprazole (PROTONIX) 40 mg in 0.9% sodium chloride 10 mL injection  40 mg IntraVENous Q24H    dutasteride (AVODART) capsule 0.5 mg  0.5 mg Oral DAILY    finasteride (PROSCAR) tablet 5 mg  5 mg Oral DAILY    lipase-protease-amylase (CREON 12,000) capsule 6 Cap  6 Cap Oral TID WITH MEALS    tamsulosin (FLOMAX) capsule 0.4 mg  0.4 mg Oral DAILY          LAB AND IMAGING FINDINGS:     Lab Results   Component Value Date/Time    WBC 5.1 09/04/2020 03:53 AM    HGB 11.8 (L) 09/04/2020 03:53 AM    PLATELET 75 (L) 20/01/6248 03:53 AM     Lab Results   Component Value Date/Time    Sodium 134 (L) 09/04/2020 03:53 AM    Potassium 3.7 09/04/2020 03:53 AM    Chloride 101 09/04/2020 03:53 AM    CO2 27 09/04/2020 03:53 AM    BUN 15 09/04/2020 03:53 AM    Creatinine 0.68 (L) 09/04/2020 03:53 AM    Calcium 8.2 (L) 09/04/2020 03:53 AM    Magnesium 2.4 12/31/2019 07:37 AM    Phosphorus 4.0 08/18/2018 04:20 AM      Lab Results   Component Value Date/Time    Alk.  phosphatase 138 (H) 09/04/2020 03:53 AM    Protein, total 4.8 (L) 09/04/2020 03:53 AM    Albumin 2.4 (L) 09/04/2020 03:53 AM    Globulin 2.4 09/04/2020 03:53 AM     Lab Results   Component Value Date/Time    INR 1.0 12/04/2019 08:21 AM    Prothrombin time 10.6 12/04/2019 08:21 AM    aPTT 27.4 09/29/2017 09:20 AM      Lab Results   Component Value Date/Time    Iron 31 (L) 01/02/2020 03:24 AM    TIBC 245 (L) 01/02/2020 03:24 AM    Iron % saturation 13 (L) 01/02/2020 03:24 AM    Ferritin 122 01/02/2020 03:24 AM      No results found for: PH, PCO2, PO2  No components found for: [place]   No results found for: CPK, CKMB             Total time:   Counseling / coordination time, spent as noted above:   > 50% counseling / coordination?:     Prolonged service was provided for  []30 min   []75 min in face to face time in the presence of the patient, spent as noted above. Time Start:   Time End:   Note: this can only be billed with 72296 (initial) or 55519 (follow up). If multiple start / stop times, list each separately.

## 2020-09-04 NOTE — PROGRESS NOTES
Problem: Falls - Risk of  Goal: *Absence of Falls  Description: Document [facility] Fall Risk and appropriate interventions in the flowsheet.   Outcome: Progressing Towards Goal  Note: Fall Risk Interventions:            Medication Interventions: Patient to call before getting OOB, Teach patient to arise slowly                   Problem: Patient Education: Go to Patient Education Activity  Goal: Patient/Family Education  Outcome: Progressing Towards Goal

## 2020-09-04 NOTE — PROGRESS NOTES
Problem: Falls - Risk of  Goal: *Absence of Falls  Description: Document Rebbecca Persons Fall Risk and appropriate interventions in the flowsheet.   Outcome: Progressing Towards Goal  Note: Fall Risk Interventions:            Medication Interventions: Patient to call before getting OOB

## 2020-09-04 NOTE — PROGRESS NOTES
[address] [facility] [address]  217 [facility] 140 Octavio Pradhan, [address]  [phone]                GI PROGRESS NOTE      NAME:   Pito Jacobson :    1955   MRN:    [id number]     Assessment/Plan   Abdominal pain, N/V:  Chronic pancreatic duct stricture previously treated with stenting  - Stent removed 2020, but unable to replace  - ALP and TBili improving  - Lipase normal - Previous triglyceride and IgG4 normal  - Plan for EUS-guided celiac plexus block with Dr. Erica La on 20  - Dr. Arelis Beckman revision of previous Whipple next week as well  - Pain management and anti-emetics prn     Extrahepatic cholangiocarcinoma: diagnosed , treated with pylorus-preserving Whipple and chemo  - T3 N1 (1 of 12 LN +ve); Invasion into pancreas; R0 resection  - CT guided biopsy 2/3/2020 of paraaortic soft tissue mass - positive for adenocarcinoma  - PET scan 2020 showed increased size  - Follows with Dr. Pam Mensah and has been seen by Palliative OP       Patient Active Problem List   Diagnosis Code    Obstructive jaundice K83.1    Common bile duct (CBD) obstruction K83.1    UTI (urinary tract infection) N39.0    Cholangiocarcinoma of biliary tract (HCC) C22.1    Cholangiocarcinoma determined by biopsy of biliary tract ([facility] [address] C24.9    Paroxysmal atrial fibrillation (HCC) I48.0    S/P ablation of atrial fibrillation Z98.890, Z86.79    Essential hypertension I10    Cramps, muscle, general R25.2    Low vitamin D level R79.89    Low vitamin B12 level E53.8    Vomiting R11.10    Controlled type 2 diabetes mellitus without complication, without long-term current use of insulin (HCC) E11.9    Acute pancreatitis K85.90    Leukocytosis D72.829    Pancreatitis K85.90    Abdominal pain R10.9    Nausea & vomiting R11.2       Subjective:     Pito Jacobson is a 62 y.o.  male   Patient still with significant abdominal pain along with nausea, but pain meds and anti-emetics helpful.  Denies vomiting, chest pain, and shortness of breath. Objective:     VITALS:   Last 24hrs VS reviewed since prior hospitalist progress note. Most recent are:  Visit Vitals  BP 99/68 (BP 1 Location: Right arm, BP Patient Position: Sitting)   Pulse 89   Temp 98 °F (36.7 °C)   Resp 16   Ht 5' 8\" (1.727 m)   Wt 68.4 kg (150 lb 11.2 oz)   SpO2 98%   BMI 22.91 kg/m²       Intake/Output Summary (Last 24 hours) at 9/4/2020 1235  Last data filed at 9/4/2020 0911  Gross per 24 hour   Intake 530 ml   Output    Net 530 ml        PHYSICAL EXAM:  General   well developed, well nourished, in no acute distress  EENT  Normocephalic, Atraumatic,  sclera clear  Respiratory   Clear To Auscultation   Cardiology  Regular Rate and Rhythm   Abdominal  Soft, tenderness along previous surgical incision and below umbilicus, non-distended  Neurological  No focal neurological deficits noted  Psychological  Oriented x 3. Normal affect. Lab Data   Recent Results (from the past 12 hour(s))   METABOLIC PANEL, COMPREHENSIVE    Collection Time: 09/04/20  3:53 AM   Result Value Ref Range    Sodium 134 (L) 136 - 145 mmol/L    Potassium 3.7 3.5 - 5.1 mmol/L    Chloride 101 97 - 108 mmol/L    CO2 27 21 - 32 mmol/L    Anion gap 6 5 - 15 mmol/L    Glucose 103 (H) 65 - 100 mg/dL    BUN 15 6 - 20 MG/DL    Creatinine 0.68 (L) 0.70 - 1.30 MG/DL    BUN/Creatinine ratio 22 (H) 12 - 20      GFR est AA >60 >60 ml/min/1.73m2    GFR est non-AA >60 >60 ml/min/1.73m2    Calcium 8.2 (L) 8.5 - 10.1 MG/DL    Bilirubin, total 2.9 (H) 0.2 - 1.0 MG/DL    ALT (SGPT) 31 12 - 78 U/L    AST (SGOT) 12 (L) 15 - 37 U/L    Alk.  phosphatase 138 (H) 45 - 117 U/L    Protein, total 4.8 (L) 6.4 - 8.2 g/dL    Albumin 2.4 (L) 3.5 - 5.0 g/dL    Globulin 2.4 2.0 - 4.0 g/dL    A-G Ratio 1.0 (L) 1.1 - 2.2     CBC WITH AUTOMATED DIFF    Collection Time: 09/04/20  3:53 AM   Result Value Ref Range    WBC 5.1 4.1 - 11.1 K/uL    RBC 3.70 (L) 4.10 - 5.70 M/uL    HGB 11.8 (L) 12.1 - 17.0 g/dL HCT 35.1 (L) 36.6 - 50.3 %    MCV 94.9 80.0 - 99.0 FL    MCH 31.9 26.0 - 34.0 PG    MCHC 33.6 30.0 - 36.5 g/dL    RDW 12.5 11.5 - 14.5 %    PLATELET 75 (L) 535 - 400 K/uL    MPV 11.5 8.9 - 12.9 FL    NRBC 0.0 0  WBC    ABSOLUTE NRBC 0.00 0.00 - 0.01 K/uL    NEUTROPHILS 85 (H) 32 - 75 %    LYMPHOCYTES 5 (L) 12 - 49 %    MONOCYTES 10 5 - 13 %    EOSINOPHILS 0 0 - 7 %    BASOPHILS 0 0 - 1 %    IMMATURE GRANULOCYTES 0 0.0 - 0.5 %    ABS. NEUTROPHILS 4.3 1.8 - 8.0 K/UL    ABS. LYMPHOCYTES 0.3 (L) 0.8 - 3.5 K/UL    ABS. MONOCYTES 0.5 0.0 - 1.0 K/UL    ABS. EOSINOPHILS 0.0 0.0 - 0.4 K/UL    ABS. BASOPHILS 0.0 0.0 - 0.1 K/UL    ABS. IMM. GRANS. 0.0 0.00 - 0.04 K/UL    DF SMEAR SCANNED      RBC COMMENTS MACROCYTOSIS  1+        RBC COMMENTS ANISOCYTOSIS  1+       VITAMIN D, 25 HYDROXY    Collection Time: 09/04/20  3:53 AM   Result Value Ref Range    Vitamin D 25-Hydroxy 32.9 30 - 100 ng/mL   SAMPLES BEING HELD    Collection Time: 09/04/20  3:53 AM   Result Value Ref Range    SAMPLES BEING HELD 1PST     COMMENT        Add-on orders for these samples will be processed based on acceptable specimen integrity and analyte stability, which may vary by analyte.

## 2020-09-04 NOTE — CONSULTS
[facility] Consultation for: Abdominal pain, history of whipple for cholangiocarcinoma    Requesting Physician: Dr. Nick Barros    History of Present Illness:      Bob Robbins is a 62 y.o. male who is known to me for a history of a pylorus preserving whipple on 10/6/17 for a distal cholangiocarcinoma. He has had longstanding issues with bile reflux gastritis and emesis. More recently, he has also had recurrent pancreatitis episodes and has been found to have a stricture at his pancreaticojejunostomy. This was being managed with ERP and stenting but he currently does not have a stent. He also had a recurrence in the retroperitoneum around the celiac that was managed with radiation and he is currently on surveillance with stable disease. The patient now presents with 1 week of worsened abdominal pain and n/v.  He states the pain came on fairly abruptly. It felt like it started lower, in his groin, but has moved up more into his abdomen now. The pain radiates to his back. He thought this was either recurrent pancreatitis or a kidney stone. CT and U/S imaging were unrevealing in the ER and his lab work was also fairly unremarkable aside from an elevated bilirubin. Surgery is asked for input into his abdominal pain. Past Medical History:   Diagnosis Date    Arrhythmia     Previous a.fib, ablation, NSR now; NO LONGER FOLLOWED BY CARDIOLOGIST.     Autoimmune disease ([address]     SJOGREN'S    Cancer ([address]     COMMON BILE DUCT, ADENOCARCINOMA    Diabetes ([address]     Family history of skin cancer     Hypertension     Sepsis ([address] 2017    STENTING TO BILE DUCT    Status post chemotherapy     Stroke [facility]) 2008    brain aneurysm - no deficits    Sun-damaged skin     Sunburn, blistering        Past Surgical History:   Procedure Laterality Date    HX GI      COLONOSCOPY, POLYPS (BENIGN)    HX GI  10/06/2017    Whipple Dr. Hernandez Elias [address] [facility] [address]  2005 Ablation     HX ORTHOPAEDIC  2000    Spinal fusion W/ HARDWARE    HX OTHER SURGICAL  11/07/2017    [place] Jamison, Dr. Aravind Miles  2017    PORTACATH - then removed    NEUROLOGICAL PROCEDURE UNLISTED  2005    [place] hole washout from cerebral hemorrhage       Social History     Socioeconomic History    Marital status:      Spouse name: Not on file    Number of children: Not on file    Years of education: Not on file    Highest education level: Not on file   Occupational History    Not on file   Social Needs    Financial resource strain: Not on file    Food insecurity     Worry: Not on file     Inability: Not on file    Transportation needs     Medical: Not on file     Non-medical: Not on file   Tobacco Use    Smoking status: Never Smoker    Smokeless tobacco: Never Used   Substance and Sexual Activity    Alcohol use: Never     Frequency: Never     Comment: very rare    Drug use: No    Sexual activity: Yes     Partners: Female   Lifestyle    Physical activity     Days per week: Not on file     Minutes per session: Not on file    Stress: Not on file   Relationships    Social connections     Talks on phone: Not on file     Gets together: Not on file     Attends [employer]ism service: Not on file     Active member of club or organization: Not on file     Attends meetings of clubs or organizations: Not on file     Relationship status: Not on file    Intimate partner violence     Fear of current or ex partner: Not on file     Emotionally abused: Not on file     Physically abused: Not on file     Forced sexual activity: Not on file   Other Topics Concern    Not on file   Social History Narrative    Not on file       Family History   Problem Relation Age of Onset    Cancer Father         Breast and Colon    Cancer Mother         LEUKEMIA    No Known Problems Sister     No Known Problems Brother     No Known Problems Sister     Anesth Problems Neg Hx          Current Facility-Administered Medications:     HYDROmorphone (PF) (DILAUDID) injection 2 mg, 2 mg, IntraVENous, Q3H PRN, Reji Shahid MD, 2 mg at 09/03/20 1755    sodium chloride (NS) flush 5-40 mL, 5-40 mL, IntraVENous, Q8H, Dylan Johnson MD, 10 mL at 09/03/20 1421    sodium chloride (NS) flush 5-40 mL, 5-40 mL, IntraVENous, PRN, Mckenna Chin MD    lactated Ringers infusion, 100 mL/hr, IntraVENous, CONTINUOUS, Rigo Johnson MD, Last Rate: 100 mL/hr at 09/03/20 1233, 100 mL/hr at 09/03/20 1233    ondansetron (ZOFRAN) injection 4 mg, 4 mg, IntraVENous, Q4H PRN, Mckenna Chin MD, 4 mg at 09/03/20 1755    heparin (porcine) injection 5,000 Units, 5,000 Units, SubCUTAneous, Q8H, Dylan Johnson MD, 5,000 Units at 09/03/20 1421    pantoprazole (PROTONIX) 40 mg in 0.9% sodium chloride 10 mL injection, 40 mg, IntraVENous, Q24H, Dylan Johnson MD, 40 mg at 09/03/20 1421    dutasteride (AVODART) capsule 0.5 mg, 0.5 mg, Oral, DAILY, Dylan Johnson MD, 0.5 mg at 09/03/20 6872    finasteride (PROSCAR) tablet 5 mg, 5 mg, Oral, DAILY, Dylan Silveira MD, 5 mg at 09/03/20 0928    lipase-protease-amylase (CREON 12,000) capsule 6 Cap, 6 Cap, Oral, TID WITH MEALS, Mckenna Chin MD, Stopped at 09/03/20 0800    tamsulosin (FLOMAX) capsule 0.4 mg, 0.4 mg, Oral, DAILY, Dylan Johnson MD, 0.4 mg at 09/03/20 0000    Allergies   Allergen Reactions    Shellfish Derived Anaphylaxis     Soft shell crabs    Morphine Nausea and Vomiting    Pcn [Penicillins] Other (comments)     Pt stated \"always been told PCN\"  Pt tolerated other cephalosporins in the past       ROS   Constitutional: weight loss  Ears, Nose, Mouth, Throat, and Face: negative  Respiratory: negative  Cardiovascular: negative  Gastrointestinal: as in HPI  Genitourinary:hx of kidney stones, L hydronephrosis seen on CT  Integument/Breast: negative  Hematologic/Lymphatic: negative  Behavioral/Psychiatric: negative  Allergic/Immunologic: negative      Physical Exam:     Visit Vitals  BP 94/54 (BP 1 Location: Right arm, BP Patient Position: At rest)   Pulse 87   Temp 98.5 °F (36.9 °C)   Resp 18   Ht 5' 8\" (1.727 m)   Wt 149 lb 14.6 oz (68 kg)   SpO2 96%   BMI 22.79 kg/m²       General - alert and oriented, no apparent distress, well developed  HEENT - NC/AT, no scleral icterus  Pulm - CTAB, normal inspiratory effort  CV - RRR, no M/R/G  Abd - soft, ND.  TTP. No palpable masses or organomegaly. No peritoneal signs. Ext - warm, well perfused, no edema  Lymphatics - No cervical or supraclavicular adenopathy  Skin - supple and no rashes  Psychiatric - normal affect, good mood    Labs  Recent Results (from the past 24 hour(s))   METABOLIC PANEL, COMPREHENSIVE    Collection Time: 09/03/20  3:04 AM   Result Value Ref Range    Sodium 138 136 - 145 mmol/L    Potassium 3.6 3.5 - 5.1 mmol/L    Chloride 104 97 - 108 mmol/L    CO2 25 21 - 32 mmol/L    Anion gap 9 5 - 15 mmol/L    Glucose 115 (H) 65 - 100 mg/dL    BUN 14 6 - 20 MG/DL    Creatinine 0.67 (L) 0.70 - 1.30 MG/DL    BUN/Creatinine ratio 21 (H) 12 - 20      GFR est AA >60 >60 ml/min/1.73m2    GFR est non-AA >60 >60 ml/min/1.73m2    Calcium 8.7 8.5 - 10.1 MG/DL    Bilirubin, total 3.5 (H) 0.2 - 1.0 MG/DL    ALT (SGPT) 44 12 - 78 U/L    AST (SGOT) 14 (L) 15 - 37 U/L    Alk. phosphatase 160 (H) 45 - 117 U/L    Protein, total 5.6 (L) 6.4 - 8.2 g/dL    Albumin 2.9 (L) 3.5 - 5.0 g/dL    Globulin 2.7 2.0 - 4.0 g/dL    A-G Ratio 1.1 1.1 - 2.2     CBC WITH AUTOMATED DIFF    Collection Time: 09/03/20  3:04 AM   Result Value Ref Range    WBC 8.0 4.1 - 11.1 K/uL    RBC 4.23 4. 10 - 5.70 M/uL    HGB 13.3 12.1 - 17.0 g/dL    HCT 39.7 36.6 - 50.3 %    MCV 93.9 80.0 - 99.0 FL    MCH 31.4 26.0 - 34.0 PG    MCHC 33.5 30.0 - 36.5 g/dL    RDW 12.6 11.5 - 14.5 %    PLATELET 87 (L) 743 - 400 K/uL    MPV 10.9 8.9 - 12.9 FL    NRBC 0.0 0  WBC    ABSOLUTE NRBC 0.00 0.00 - 0.01 K/uL    NEUTROPHILS 87 (H) 32 - 75 %    LYMPHOCYTES 4 (L) 12 - 49 %    MONOCYTES 8 5 - 13 %    EOSINOPHILS 0 0 - 7 %    BASOPHILS 0 0 - 1 %    IMMATURE GRANULOCYTES 1 (H) 0.0 - 0.5 %    ABS. NEUTROPHILS 7.0 1.8 - 8.0 K/UL    ABS. LYMPHOCYTES 0.3 (L) 0.8 - 3.5 K/UL    ABS. MONOCYTES 0.6 0.0 - 1.0 K/UL    ABS. EOSINOPHILS 0.0 0.0 - 0.4 K/UL    ABS. BASOPHILS 0.0 0.0 - 0.1 K/UL    ABS. IMM. GRANS. 0.1 (H) 0.00 - 0.04 K/UL    DF SMEAR SCANNED      RBC COMMENTS ANISOCYTOSIS  1+       URINALYSIS W/MICROSCOPIC    Collection Time: 09/03/20  7:05 AM   Result Value Ref Range    Color DARK YELLOW      Appearance CLEAR CLEAR      Specific gravity 1.025      pH (UA) 6.0 5.0 - 8.0      Protein TRACE (A) NEG mg/dL    Glucose Negative NEG mg/dL    Ketone 40 (A) NEG mg/dL    Blood MODERATE (A) NEG      Urobilinogen 4.0 (H) 0.2 - 1.0 EU/dL    Nitrites Negative NEG      Leukocyte Esterase TRACE (A) NEG      WBC 0-4 0 - 4 /hpf    RBC 20-50 0 - 5 /hpf    Epithelial cells FEW FEW /lpf    Bacteria Negative NEG /hpf    Hyaline cast 2-5 0 - 5 /lpf   BILIRUBIN, CONFIRM    Collection Time: 09/03/20  7:05 AM   Result Value Ref Range    Bilirubin UA, confirm Negative             Imaging  CT Results (most recent):  Results from Hospital Encounter encounter on 09/02/20   CT ABD PELV W CONT    Narrative EXAM: CT ABD PELV W CONT    INDICATION: abd pain nausea vomiting hx bile duct CA with recurrence, s/p  whipple    COMPARISON: 5/13/2020     CONTRAST: 100 mL of Isovue-370. TECHNIQUE:   Following the uneventful intravenous administration of contrast, thin axial  images were obtained through the abdomen and pelvis. Coronal and sagittal  reconstructions were generated. Oral contrast was not administered. CT dose  reduction was achieved through use of a standardized protocol tailored for this  examination and automatic exposure control for dose modulation. FINDINGS:   LOWER THORAX: 9 mm groundglass opacity is noted in the right middle lobe. LIVER: Hepatic steatosis again noted.  Intrahepatic biliary air persists. BILIARY TREE: Status post cholecystectomy. SPLEEN: within normal limits. PANCREAS: Status post Whipple. ADRENALS: Unremarkable. KIDNEYS: Mild left hydronephrosis and delayed nephrogram is noted of uncertain  etiology. Stable left renal cyst.  STOMACH: Postoperative changes. SMALL BOWEL: No dilatation or wall thickening. COLON: No dilatation or wall thickening. APPENDIX: Unremarkable. PERITONEUM: No ascites or pneumoperitoneum. RETROPERITONEUM: Paraceliac soft tissue density has not changed compared to the  prior examination. Inflammation in the left anterior pararenal space is  unchanged. REPRODUCTIVE ORGANS: Mild prostate enlargement measuring 5.2 cm. URINARY BLADDER: No mass or calculus. BONES: Postoperative changes are seen in the lumbar spine. ABDOMINAL WALL: No mass or hernia. ADDITIONAL COMMENTS: N/A      Impression IMPRESSION:  Mild left hydronephrosis and delayed nephrogram of uncertain etiology as a  ureteral stone is not visualized. Status post Whipple. Paraceliac soft tissue  abnormality is unchanged compared to the prior exam. Inflammatory changes in the  left anterior pararenal space unchanged. US Results (most recent):  Results from [place] encounter on 09/02/20   US ABD LTD    Narrative EXAM: US ABD LTD    INDICATION: Abdominal pain and vomiting. Mild left hydronephrosis on CT. Previous Whipple surgery. COMPARISON: CT abdomen earlier today at 10:00 AM.    TECHNIQUE: Limited abdominal ultrasound. FINDINGS:     Liver: echogenicity is heterogeneous. No focal liver lesion. Main portal vein flow: Hepatopetal and within normal limits. Fluid: No ascites. Gallbladder: Cholecystectomy. Bile ducts: There is no intra or extrahepatic biliary ductal dilatation. The  common hepatic duct measures 6 mm. Pancreas: The visualized portions are within normal limits. Kidneys: Right length: 13 cm. No hydronephrosis.       Impression IMPRESSION:     Normal biliary tree post cholecystectomy and Whipple surgery. Unchanged  heterogeneous liver given difference in technique. I have personally reviewed all of the pertinent images     Assessment:     Gricelda Ferguson. is a 62 y.o. male with recurrent severe abdominal pain, n/v with history of whipple for cholangiocarcinoma now with known recurrence around celiac that has been treated with radiation. He also has an elevated bilirubin of unclear etiology and a stricture of his pancreatico-jejunostomy. Recommendations:     1. I would like to get an MRI/MRCP to better assess his bile duct reconstruction and pancreatic duct. Fractionated bilirubin ordered for the morning to ensure this is a conjugated hyperbilirubinemia and not something like Gilbert's or a hemolytic anemia. He may require surgical revision of his whipple to a eren-en Y gastrojejunostomy for the longstanding bile reflux gastritis and emesis. If that is performed, he may also need some type of intervention for his pancreaticojejunostomy stricture. I agree with plans for celiac plexus block as first step for pain management. Non-contrast CT also has been ordered to further evaluate his hydronephrosis and rule out kidney stones. Will follow.       Lottie Perkins MD  9/3/2020  8:38 PM

## 2020-09-04 NOTE — PROGRESS NOTES
[facility]      Subjective     Patient still with bilateral flank, back, epigastric and inguinal pain. Managed with IV dilaudid but otherwise remains severe. Taking clears but continues to have intermittent bilious emesis. No other changes. Had MRI/MRCP and non-contrast CT last night. Objective     Patient Vitals for the past 24 hrs:   Temp Pulse Resp BP SpO2   09/04/20 0908 98 °F (36.7 °C) 89 16 99/68 98 %   09/04/20 0341 98.4 °F (36.9 °C) 79 16 97/60 96 %   09/03/20 2120 99.3 °F (37.4 °C) 81 18 102/63 97 %   09/03/20 1504 98.5 °F (36.9 °C) 87 18 94/54 96 %       Date 09/03/20 0700 - 09/04/20 0659 09/04/20 0700 - 09/05/20 0659   Shift 6308-1025 2244-6850 24 Hour Total 0350-2684 3740-5885 24 Hour Total   INTAKE   P.O. 310  310 240  240     P. O. 310  310 240  240   Shift Total(mL/kg) 310(4.6)  310(4.5) 240(3.5)  240(3.5)   OUTPUT   Urine(mL/kg/hr)           Urine Occurrence(s) 6 x  6 x      Shift Total(mL/kg)           310 240  240   Weight (kg) 68 68.4 68.4 68.4 68.4 68.4       PE  GEN - Awake, alert, communicating appropriately. NAD  Pulm - CTAB  CV - RRR  Abd - soft, ND,  TTP diffusely. No rebound or guarding. Ext - warm, well perfused. Labs  Recent Results (from the past 24 hour(s))   METABOLIC PANEL, COMPREHENSIVE    Collection Time: 09/04/20  3:53 AM   Result Value Ref Range    Sodium 134 (L) 136 - 145 mmol/L    Potassium 3.7 3.5 - 5.1 mmol/L    Chloride 101 97 - 108 mmol/L    CO2 27 21 - 32 mmol/L    Anion gap 6 5 - 15 mmol/L    Glucose 103 (H) 65 - 100 mg/dL    BUN 15 6 - 20 MG/DL    Creatinine 0.68 (L) 0.70 - 1.30 MG/DL    BUN/Creatinine ratio 22 (H) 12 - 20      GFR est AA >60 >60 ml/min/1.73m2    GFR est non-AA >60 >60 ml/min/1.73m2    Calcium 8.2 (L) 8.5 - 10.1 MG/DL    Bilirubin, total 2.9 (H) 0.2 - 1.0 MG/DL    ALT (SGPT) 31 12 - 78 U/L    AST (SGOT) 12 (L) 15 - 37 U/L    Alk.  phosphatase 138 (H) 45 - 117 U/L    Protein, total 4.8 (L) 6.4 - 8.2 g/dL    Albumin 2.4 (L) 3.5 - 5.0 g/dL    Globulin 2.4 2.0 - 4.0 g/dL    A-G Ratio 1.0 (L) 1.1 - 2.2     CBC WITH AUTOMATED DIFF    Collection Time: 09/04/20  3:53 AM   Result Value Ref Range    WBC 5.1 4.1 - 11.1 K/uL    RBC 3.70 (L) 4.10 - 5.70 M/uL    HGB 11.8 (L) 12.1 - 17.0 g/dL    HCT 35.1 (L) 36.6 - 50.3 %    MCV 94.9 80.0 - 99.0 FL    MCH 31.9 26.0 - 34.0 PG    MCHC 33.6 30.0 - 36.5 g/dL    RDW 12.5 11.5 - 14.5 %    PLATELET 75 (L) 481 - 400 K/uL    MPV 11.5 8.9 - 12.9 FL    NRBC 0.0 0  WBC    ABSOLUTE NRBC 0.00 0.00 - 0.01 K/uL    NEUTROPHILS 85 (H) 32 - 75 %    LYMPHOCYTES 5 (L) 12 - 49 %    MONOCYTES 10 5 - 13 %    EOSINOPHILS 0 0 - 7 %    BASOPHILS 0 0 - 1 %    IMMATURE GRANULOCYTES 0 0.0 - 0.5 %    ABS. NEUTROPHILS 4.3 1.8 - 8.0 K/UL    ABS. LYMPHOCYTES 0.3 (L) 0.8 - 3.5 K/UL    ABS. MONOCYTES 0.5 0.0 - 1.0 K/UL    ABS. EOSINOPHILS 0.0 0.0 - 0.4 K/UL    ABS. BASOPHILS 0.0 0.0 - 0.1 K/UL    ABS. IMM. GRANS. 0.0 0.00 - 0.04 K/UL    DF SMEAR SCANNED      RBC COMMENTS MACROCYTOSIS  1+        RBC COMMENTS ANISOCYTOSIS  1+       VITAMIN D, 25 HYDROXY    Collection Time: 09/04/20  3:53 AM   Result Value Ref Range    Vitamin D 25-Hydroxy 32.9 30 - 100 ng/mL   SAMPLES BEING HELD    Collection Time: 09/04/20  3:53 AM   Result Value Ref Range    SAMPLES BEING HELD 1PST     COMMENT        Add-on orders for these samples will be processed based on acceptable specimen integrity and analyte stability, which may vary by analyte. MRI Results (most recent):  Results from [place] encounter on 09/02/20   MRI ABD W MRCP W WO CONT    Narrative MRI ABDOMEN AND MRCP WITH AND WITHOUT CONTRAST. 9/3/2020 11:15 PM    INDICATION: Elevated bilirubin. History of Whipple for cholangiocarcinoma. Stricture of the pancreatic anastomosis. COMPARISON: CT abdomen pelvis 9/3/2020, 9/2/2020, 12/4/2019.     TECHNIQUE: Multisequence and multiplanar MRI of the abdomen was performed after  the administration of 7 mL of Gadavist (gadobutrol)  IV contrast. Images were  obtained without contrast; and in late arterial, portal venous, and delayed  phases after the administration of contrast.     Heavily T2-weighted thick slab and thin slice images were obtained in the  oblique coronal plane through the biliary tree (MRCP). FINDINGS:   Recurrence: Abnormal soft tissue centered between the common hepatic artery and  the SMA has increased. On 12/4/2019, the SMA and common hepatic artery where  encased in the celiac artery was abutted. It measured approximately 16 x 44 x 24  mm in greatest dimensions Evaluation at that time was compounded by superimposed  acute pancreatitis, however. On today's examination and on 9/2/2020, it measured  34 x 47 x 58 mm. This spiculated, T1 hypointense mass shows slight enhancement on delayed phases  (13-67, 10-67). It now encases the superior mesenteric artery and narrows it  from the origin over greater than 2 cm. There is poststenotic dilation of the  midportion. The celiac and common hepatic arteries are encased but not narrowed. The bilateral renal arteries are encased and more mildly narrowed. The left  renal vein is obliterated, and the left kidney drains via collaterals to the  left gonadal splenic, and lumbar veins. The aorta is abutted over 180 degrees. This represents recurrent tumor until proven otherwise. Retroperitoneal fibrosis  is a less likely possibility. Abdomen: There is heterogeneous perfusion throughout the liver on arterial  phase, with no correlate on portal venous phase, and delayed phase, or  T2-weighted images. This is more than typically seen as perfusion anomalies. It  is indeterminate, but of questionable significance. The portal and splenic veins remain dilated. No downstream narrowing. No overt  cirrhosis. No superior mesenteric vein dilation. Moderate left retroperitoneal edema is likely due to renal vein occlusion. A  trace left pleural effusion and trace left paracolic ascites are also  associated. No pancreatic or biliary duct dilation. Incidental note is made of left renal  cysts and lower thoracic nerve root sleeve cysts. The distal esophagus, stomach,  gastrojejunostomy, biliary limb, spleen, adrenals, and kidneys are otherwise  normal. Descending diverticulosis is mild. Post L5-S1 fusion. Impression IMPRESSION:   1. Perivascular recurrence in the superior retroperitoneum extends around the  celiac, common hepatic, superior mesenteric, and bilateral renal arteries. 2. Left renal vein occlusion. Moderate consequent, left retroperitoneal edema,  trace ascites, and trace left pleural effusion. 3. Narrowing of the origin and proximal SMA. Poststenotic dilation of the  midportion. 4. More mild narrowing of the bilateral renal arteries. CT Results (most recent):  Results from Hospital Encounter encounter on 09/02/20   CT ABD PELV WO CONT    Narrative EXAM: CT ABD PELV WO CONT    INDICATION: Evaluation for renal or ureteral stones    COMPARISON: CT from the prior day    CONTRAST:  None. TECHNIQUE:   Thin axial images were obtained through the abdomen and pelvis. Coronal and  sagittal reformats were generated. Oral contrast was not administered. CT dose  reduction was achieved through use of a standardized protocol tailored for this  examination and automatic exposure control for dose modulation. The absence of intravenous contrast material reduces the sensitivity for  evaluation of the vasculature and solid organs. FINDINGS:   LOWER THORAX: No significant abnormality in the incidentally imaged lower chest.  LIVER: No mass. BILIARY TREE: Prior cholecystectomy. Mild pneumobilia. CBD is not dilated. SPLEEN: within normal limits. PANCREAS: No focal abnormality. ADRENALS: Unremarkable. KIDNEYS/URETERS: Nonobstructing 2 and 3 mm calculi.  Left hydronephrosis and  hydroureter to the level of the pelvis. STOMACH: Unremarkable. SMALL BOWEL: No dilatation or wall thickening. COLON: No dilatation or wall thickening. Sigmoid diverticulosis. APPENDIX:  PERITONEUM: No ascites or pneumoperitoneum. RETROPERITONEUM: No lymphadenopathy or aortic aneurysm. REPRODUCTIVE ORGANS: Prostatomegaly with prostate calcifications  URINARY BLADDER: No mass or calculus. BONES: No destructive bone lesion. ABDOMINAL WALL: No mass or hernia. ADDITIONAL COMMENTS: N/A      Impression IMPRESSION:  Nonobstructing right renal calculus  Left hydronephrosis and hydroureter but no distal ureteral calculus  Incidental sigmoid diverticulosis  Mild prostatomegaly         Assessment     Jasminamilcar Dinesh Sanders. is a 62 y. 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 He now presents with acutely worsened abdominal pain and ongoing bilious emesis. Plan     - MRI shows left renal vein occlusion, left hydronephrosis and perirenal edema throughout the retroperitoneum. I think this may be the result of acute left renal vein occlusion and developing collaterals that is likely related to either his tumor recurrence or radiation. I would recommend getting input from Urology as to whether there is any recommended intervention in this case that may be helpful for his pain.    - Dr. Mihir Gomez for celiac plexus block next week to assist with pain control from tumor recurrence in that area. - I discussed performing a conversion of his duodenojejunostomy to a eren-en Y reconstruction next week to help his longstanding emesis which is from bile reflux gastritis. Hopefully this can be set up for Wednesday. - Etiology of his elevated bilirubin unclear. I have ordered a fractionated bilirubin to see if this is obstructive or not.   This may be Gilbert's or a hemolytic source if predominantly unconjugated as no signs of biliary anastomotic stricture or ductal dilation is noted on MRCP.    - No pancreatic ductal dilation noted on MRCP, so I would recommend leaving this anastomosis alone currently.       Cb Hood MD  9/4/2020  11:31 AM

## 2020-09-04 NOTE — PROGRESS NOTES
Spiritual Care Assessment/Progress Note  [address]      NAME: Mary Ellen Da Silva. MRN: [id number]  AGE: 62 y.o.  SEX: male  Worship Affiliation: Samaritan   Language: English     9/4/2020     Total Time (in minutes): 15     Spiritual Assessment begun in [facility] through conversation with:         [x]Patient        [] Family    [] Friend(s)        Reason for Consult: Palliative Care, Initial/Spiritual Assessment     Spiritual beliefs: (Please include comment if needed)     [x] Identifies with a lima tradition: Samaritan      [] Supported by a lima community:            [] Claims no spiritual orientation:           [] Seeking spiritual identity:                [] Adheres to an individual form of spirituality:           [] Not able to assess:                           Identified resources for coping:      [x] Prayer                               [] Music                  [] Guided Imagery     [x] Family/friends                 [] Pet visits     [] Devotional reading                         [] Unknown     [] Other:                                              Interventions offered during this visit: (See comments for more details)    Patient Interventions: Affirmation of emotions/emotional suffering, Affirmation of lima, Catharsis/review of pertinent events in supportive environment, Iconic (affirming the presence of God/Higher Power), Normalization of emotional/spiritual concerns, Prayer (actual), Prayer (assurance of)           Plan of Care:     [] Support spiritual and/or cultural needs    [] Support AMD and/or advance care planning process      [] Support grieving process   [] Coordinate Rites and/or Rituals    [] Coordination with community clergy   [] No spiritual needs identified at this time   [] Detailed Plan of Care below (See Comments)  [] Make referral to Music Therapy  [] Make referral to Pet Therapy     [] Make referral to Addiction services  [] Make referral to [facility]  [] Make referral to Spiritual Care Partner  [] No future visits requested        [x] Follow up visits as needed     Visited with pt for initial spiritual assessment. Pt is a new Palliative medicine consult. Pt spoke of his pain and the fact that he has had previous encounters with such pain. He was alone at time of visit, however he expects his wife, Silvia Luque to visit this afternoon. 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 He reports very good support from his family. Assured pt of ongoing  support as needed.    Chaplain Romelia, MDiv, MS, [facility]  [phone])

## 2020-09-04 NOTE — PROGRESS NOTES
Bedside and Verbal shift change report given to Wendi Pritchard (oncoming nurse) by Chloe Paniagua (offgoing nurse). Report included the following information SBAR, Kardex, MAR and Recent Results.

## 2020-09-04 NOTE — PROGRESS NOTES
Patient: Marixa Kwong. MRN: [id number]  SSN: [id number]    YOB: 1955  Age: 62 y.o. Sex: male        ADMITTED: 2020 to Donald Murrell MD by Manny Foy MD for Nausea & vomiting [R11.2]  Abdominal pain [R10.9]  POD# * No surgery date entered * Procedure(s):  LAPAROSCOPIC REVISION OF GASTROJEJUNOSTOMY TO LOLI-EN Y, POSSIBLE OPEN (URGENT)    Marixa Kwong. is doing fair c/o cherelle groin and back pain . Wife at bedside she states the groin pain is new onset , 2 week duration , feels it ts related to kidney pain . Pt has hx of kidney stones no surgical interventions in past .  Admitted with N/V  S/p Whipple procedure and radiation for recurrence . Pt noted to have hydro on CT , without evidence of stone . Urology consulted . Pt in bed wife at bedside . Discussed findings on CT Including renal vein occlusion , persistent L hydronephrosis and hydroureter . Pt has cherelle groin and back pain unable to differentiate which side is worse . Pt also c/o urinary hesitancy and  difficulty voiding. Pt scheudled for revision of whipple and Celiac plexus block on Wednesday  . Discussed possiblity of cysto with stent placement for hydronephrosis , prior to if hydro still present . Dr. Paulette Man present for exam     Vitals: Temp (24hrs), Av.6 °F (37 °C), Min:98 °F (36.7 °C), Max:99.3 °F (37.4 °C)    Blood pressure 97/58, pulse 80, temperature 98.5 °F (36.9 °C), resp. rate 16, height 5' 8\" (1.727 m), weight 68.4 kg (150 lb 11.2 oz), SpO2 97 %. Intake and Output:  1901 -  0700  In: 310 [P.O.:310]  Out: -    07 -  190  In: 480 [P.O.:480]  Out: -   CHRIS Output lats 24 hrs: No data found. CHRIS Output last 8 hrs: No data found. BM over last 24 hrs: No data found.     Exam:  EXAMINATION:    Appearance:  well-developed NAD, thin     Conjunctiva/Lids: conjunctivae and lids normal   External Ears/Nose:   normal no lesions or deformities   Neck:  supple   Respiratory Effort: breathing easily   Lower Extremity: no edema   Abdomen/Flank: soft non-tender without masses; no CVA tenderness , back pain    Liver/Spleen: no organomegaly     Hernia: no ventral hernia    Gait/Station: normal   Skin Inspection:  warm and dry   Mood/Affect: normal                          Labs:  CBC:   Lab Results   Component Value Date/Time    WBC 5.1 09/04/2020 03:53 AM    HCT 35.1 (L) 09/04/2020 03:53 AM    PLATELET 75 (L) 92/99/6780 03:53 AM     BMP:   Lab Results   Component Value Date/Time    Glucose 103 (H) 09/04/2020 03:53 AM    Sodium 134 (L) 09/04/2020 03:53 AM    Potassium 3.7 09/04/2020 03:53 AM    Chloride 101 09/04/2020 03:53 AM    CO2 27 09/04/2020 03:53 AM    BUN 15 09/04/2020 03:53 AM    Creatinine 0.68 (L) 09/04/2020 03:53 AM    Calcium 8.2 (L) 09/04/2020 03:53 AM     Cultures: N/A    Imaging:       CT:   Results for orders placed during the hospital encounter of 09/02/20   CT ABD PELV WO CONT    Narrative EXAM: CT ABD PELV WO CONT    INDICATION: Evaluation for renal or ureteral stones    COMPARISON: CT from the prior day    CONTRAST:  None. TECHNIQUE:   Thin axial images were obtained through the abdomen and pelvis. Coronal and  sagittal reformats were generated. Oral contrast was not administered. CT dose  reduction was achieved through use of a standardized protocol tailored for this  examination and automatic exposure control for dose modulation. The absence of intravenous contrast material reduces the sensitivity for  evaluation of the vasculature and solid organs. FINDINGS:   LOWER THORAX: No significant abnormality in the incidentally imaged lower chest.  LIVER: No mass. BILIARY TREE: Prior cholecystectomy. Mild pneumobilia. CBD is not dilated. SPLEEN: within normal limits. PANCREAS: No focal abnormality. ADRENALS: Unremarkable. KIDNEYS/URETERS: Nonobstructing 2 and 3 mm calculi. Left hydronephrosis and  hydroureter to the level of the pelvis. STOMACH: Unremarkable.   SMALL BOWEL: No dilatation or wall thickening. COLON: No dilatation or wall thickening. Sigmoid diverticulosis. APPENDIX:  PERITONEUM: No ascites or pneumoperitoneum. RETROPERITONEUM: No lymphadenopathy or aortic aneurysm. REPRODUCTIVE ORGANS: Prostatomegaly with prostate calcifications  URINARY BLADDER: No mass or calculus. BONES: No destructive bone lesion. ABDOMINAL WALL: No mass or hernia. ADDITIONAL COMMENTS: N/A      Impression IMPRESSION:  Nonobstructing right renal calculus  Left hydronephrosis and hydroureter but no distal ureteral calculus  Incidental sigmoid diverticulosis  Mild prostatomegaly     Assessment/Plan:     1. L Hydronephrosis / Hydroureter - possibly secondary to radiation possible ureteral stricture . recc Cystoscopy with L stent placement if hydro still present . Will order renal U/S for Sunday , then plan schedule for Monday or Tuesday . 2. Urinary hesitancy - bladder scan to ensure pt emptying , If PVR greater than 400 ml place hogan . Continue Tamsulosin and Advodart   3. Renal vein occlusion - rec Vascular consult for opinion on full anticoagulation       Plan discussed with Dr. Aliya Cummins   Signed By: Margo Clayton.  Fermin Jimenez NP - September 4, 2020

## 2020-09-05 LAB
ALBUMIN SERPL-MCNC: 2.5 G/DL (ref 3.5–5)
ALBUMIN/GLOB SERPL: 1 {RATIO} (ref 1.1–2.2)
ALP SERPL-CCNC: 144 U/L (ref 45–117)
ALT SERPL-CCNC: 30 U/L (ref 12–78)
ANION GAP SERPL CALC-SCNC: 7 MMOL/L (ref 5–15)
AST SERPL-CCNC: 16 U/L (ref 15–37)
BASOPHILS # BLD: 0 K/UL (ref 0–0.1)
BASOPHILS NFR BLD: 0 % (ref 0–1)
BILIRUB SERPL-MCNC: 1.8 MG/DL (ref 0.2–1)
BUN SERPL-MCNC: 10 MG/DL (ref 6–20)
BUN/CREAT SERPL: 17 (ref 12–20)
CALCIUM SERPL-MCNC: 8.1 MG/DL (ref 8.5–10.1)
CHLORIDE SERPL-SCNC: 101 MMOL/L (ref 97–108)
CO2 SERPL-SCNC: 26 MMOL/L (ref 21–32)
CREAT SERPL-MCNC: 0.59 MG/DL (ref 0.7–1.3)
DIFFERENTIAL METHOD BLD: ABNORMAL
EOSINOPHIL # BLD: 0.1 K/UL (ref 0–0.4)
EOSINOPHIL NFR BLD: 4 % (ref 0–7)
ERYTHROCYTE [DISTWIDTH] IN BLOOD BY AUTOMATED COUNT: 12.3 % (ref 11.5–14.5)
GLOBULIN SER CALC-MCNC: 2.6 G/DL (ref 2–4)
GLUCOSE SERPL-MCNC: 103 MG/DL (ref 65–100)
HCT VFR BLD AUTO: 35 % (ref 36.6–50.3)
HGB BLD-MCNC: 11.8 G/DL (ref 12.1–17)
IMM GRANULOCYTES # BLD AUTO: 0 K/UL (ref 0–0.04)
IMM GRANULOCYTES NFR BLD AUTO: 0 % (ref 0–0.5)
LYMPHOCYTES # BLD: 0.3 K/UL (ref 0.8–3.5)
LYMPHOCYTES NFR BLD: 10 % (ref 12–49)
MCH RBC QN AUTO: 31.5 PG (ref 26–34)
MCHC RBC AUTO-ENTMCNC: 33.7 G/DL (ref 30–36.5)
MCV RBC AUTO: 93.3 FL (ref 80–99)
MONOCYTES # BLD: 0.4 K/UL (ref 0–1)
MONOCYTES NFR BLD: 13 % (ref 5–13)
NEUTS SEG # BLD: 2.4 K/UL (ref 1.8–8)
NEUTS SEG NFR BLD: 73 % (ref 32–75)
NRBC # BLD: 0 K/UL (ref 0–0.01)
NRBC BLD-RTO: 0 PER 100 WBC
PLATELET # BLD AUTO: 71 K/UL (ref 150–400)
PMV BLD AUTO: 11.1 FL (ref 8.9–12.9)
POTASSIUM SERPL-SCNC: 3.4 MMOL/L (ref 3.5–5.1)
PROT SERPL-MCNC: 5.1 G/DL (ref 6.4–8.2)
RBC # BLD AUTO: 3.75 M/UL (ref 4.1–5.7)
RBC MORPH BLD: ABNORMAL
SODIUM SERPL-SCNC: 134 MMOL/L (ref 136–145)
WBC # BLD AUTO: 3.2 K/UL (ref 4.1–11.1)

## 2020-09-05 PROCEDURE — 74011250637 HC RX REV CODE- 250/637: Performed by: FAMILY MEDICINE

## 2020-09-05 PROCEDURE — 36415 COLL VENOUS BLD VENIPUNCTURE: CPT

## 2020-09-05 PROCEDURE — 65270000029 HC RM PRIVATE

## 2020-09-05 PROCEDURE — 85025 COMPLETE CBC W/AUTO DIFF WBC: CPT

## 2020-09-05 PROCEDURE — 74011250636 HC RX REV CODE- 250/636: Performed by: INTERNAL MEDICINE

## 2020-09-05 PROCEDURE — 74011250636 HC RX REV CODE- 250/636: Performed by: FAMILY MEDICINE

## 2020-09-05 PROCEDURE — 74011000250 HC RX REV CODE- 250: Performed by: FAMILY MEDICINE

## 2020-09-05 PROCEDURE — C9113 INJ PANTOPRAZOLE SODIUM, VIA: HCPCS | Performed by: FAMILY MEDICINE

## 2020-09-05 PROCEDURE — 80053 COMPREHEN METABOLIC PANEL: CPT

## 2020-09-05 RX ORDER — LOPERAMIDE HYDROCHLORIDE 2 MG/1
4 CAPSULE ORAL
Status: DISCONTINUED | OUTPATIENT
Start: 2020-09-05 | End: 2020-09-11 | Stop reason: HOSPADM

## 2020-09-05 RX ADMIN — ONDANSETRON 4 MG: 2 INJECTION INTRAMUSCULAR; INTRAVENOUS at 08:21

## 2020-09-05 RX ADMIN — HYDROMORPHONE HYDROCHLORIDE 2 MG: 1 INJECTION, SOLUTION INTRAMUSCULAR; INTRAVENOUS; SUBCUTANEOUS at 20:19

## 2020-09-05 RX ADMIN — PANCRELIPASE 6 CAPSULE: 60000; 12000; 38000 CAPSULE, DELAYED RELEASE PELLETS ORAL at 17:24

## 2020-09-05 RX ADMIN — DUTASTERIDE 0.5 MG: 0.5 CAPSULE, LIQUID FILLED ORAL at 08:34

## 2020-09-05 RX ADMIN — FINASTERIDE 5 MG: 5 TABLET, FILM COATED ORAL at 08:34

## 2020-09-05 RX ADMIN — Medication 10 ML: at 23:02

## 2020-09-05 RX ADMIN — HYDROMORPHONE HYDROCHLORIDE 2 MG: 1 INJECTION, SOLUTION INTRAMUSCULAR; INTRAVENOUS; SUBCUTANEOUS at 23:01

## 2020-09-05 RX ADMIN — SODIUM CHLORIDE 5 MG: 9 INJECTION INTRAMUSCULAR; INTRAVENOUS; SUBCUTANEOUS at 11:16

## 2020-09-05 RX ADMIN — Medication 10 ML: at 06:45

## 2020-09-05 RX ADMIN — SODIUM CHLORIDE 5 MG: 9 INJECTION INTRAMUSCULAR; INTRAVENOUS; SUBCUTANEOUS at 23:02

## 2020-09-05 RX ADMIN — PANCRELIPASE 6 CAPSULE: 60000; 12000; 38000 CAPSULE, DELAYED RELEASE PELLETS ORAL at 06:46

## 2020-09-05 RX ADMIN — ONDANSETRON 4 MG: 2 INJECTION INTRAMUSCULAR; INTRAVENOUS at 20:19

## 2020-09-05 RX ADMIN — HYDROMORPHONE HYDROCHLORIDE 2 MG: 1 INJECTION, SOLUTION INTRAMUSCULAR; INTRAVENOUS; SUBCUTANEOUS at 01:05

## 2020-09-05 RX ADMIN — ONDANSETRON 4 MG: 2 INJECTION INTRAMUSCULAR; INTRAVENOUS at 14:15

## 2020-09-05 RX ADMIN — LOPERAMIDE HYDROCHLORIDE 4 MG: 2 CAPSULE ORAL at 17:24

## 2020-09-05 RX ADMIN — HEPARIN SODIUM 5000 UNITS: 5000 INJECTION INTRAVENOUS; SUBCUTANEOUS at 06:43

## 2020-09-05 RX ADMIN — SODIUM CHLORIDE 40 MG: 9 INJECTION INTRAMUSCULAR; INTRAVENOUS; SUBCUTANEOUS at 14:15

## 2020-09-05 RX ADMIN — HYDROMORPHONE HYDROCHLORIDE 2 MG: 1 INJECTION, SOLUTION INTRAMUSCULAR; INTRAVENOUS; SUBCUTANEOUS at 11:15

## 2020-09-05 RX ADMIN — SODIUM CHLORIDE 5 MG: 9 INJECTION INTRAMUSCULAR; INTRAVENOUS; SUBCUTANEOUS at 17:24

## 2020-09-05 RX ADMIN — HYDROMORPHONE HYDROCHLORIDE 2 MG: 1 INJECTION, SOLUTION INTRAMUSCULAR; INTRAVENOUS; SUBCUTANEOUS at 08:20

## 2020-09-05 RX ADMIN — PANCRELIPASE 6 CAPSULE: 60000; 12000; 38000 CAPSULE, DELAYED RELEASE PELLETS ORAL at 12:00

## 2020-09-05 RX ADMIN — Medication 10 ML: at 14:17

## 2020-09-05 RX ADMIN — HYDROMORPHONE HYDROCHLORIDE 2 MG: 1 INJECTION, SOLUTION INTRAMUSCULAR; INTRAVENOUS; SUBCUTANEOUS at 14:15

## 2020-09-05 RX ADMIN — HEPARIN SODIUM 5000 UNITS: 5000 INJECTION INTRAVENOUS; SUBCUTANEOUS at 18:34

## 2020-09-05 RX ADMIN — ONDANSETRON 4 MG: 2 INJECTION INTRAMUSCULAR; INTRAVENOUS at 04:31

## 2020-09-05 RX ADMIN — HYDROMORPHONE HYDROCHLORIDE 2 MG: 1 INJECTION, SOLUTION INTRAMUSCULAR; INTRAVENOUS; SUBCUTANEOUS at 04:30

## 2020-09-05 RX ADMIN — ONDANSETRON 4 MG: 2 INJECTION INTRAMUSCULAR; INTRAVENOUS at 01:05

## 2020-09-05 RX ADMIN — PANCRELIPASE 6 CAPSULE: 60000; 12000; 38000 CAPSULE, DELAYED RELEASE PELLETS ORAL at 08:21

## 2020-09-05 RX ADMIN — HYDROMORPHONE HYDROCHLORIDE 2 MG: 1 INJECTION, SOLUTION INTRAMUSCULAR; INTRAVENOUS; SUBCUTANEOUS at 17:24

## 2020-09-05 RX ADMIN — TAMSULOSIN HYDROCHLORIDE 0.4 MG: 0.4 CAPSULE ORAL at 08:34

## 2020-09-05 NOTE — PROGRESS NOTES
Progress Note    Patient: Juliette Madrigal. MRN: [id number]  SSN: [id number]    YOB: 1955  Age: 62 y.o. Sex: male      Admit Date: 2020    * No surgery date entered *    Procedure:  Procedure(s):  LAPAROSCOPIC REVISION OF GASTROJEJUNOSTOMY TO LOLI-EN Y, POSSIBLE OPEN (URGENT)    Subjective:     No acute surgical issues. Pt is doing better. Tolerating clears without nausea or vomiting. Pain is under control. Pt is passing flatus and having BM. Objective:     Visit Vitals  BP (!) 89/53 (BP 1 Location: Right arm, BP Patient Position: At rest)   Pulse 62   Temp 97.5 °F (36.4 °C)   Resp 16   Ht 5' 8\" (1.727 m)   Wt 150 lb 11.2 oz (68.4 kg)   SpO2 100%   BMI 22.91 kg/m²       Temp (24hrs), Av.1 °F (36.7 °C), Min:97.5 °F (36.4 °C), Max:98.6 °F (37 °C)        Physical Exam:    Gen:  NAD  Pulm:  Unlabored  Abd:  S/ND/Non-TTP    Recent Results (from the past 24 hour(s))   CBC WITH AUTOMATED DIFF    Collection Time: 20  1:12 AM   Result Value Ref Range    WBC 3.2 (L) 4.1 - 11.1 K/uL    RBC 3.75 (L) 4.10 - 5.70 M/uL    HGB 11.8 (L) 12.1 - 17.0 g/dL    HCT 35.0 (L) 36.6 - 50.3 %    MCV 93.3 80.0 - 99.0 FL    MCH 31.5 26.0 - 34.0 PG    MCHC 33.7 30.0 - 36.5 g/dL    RDW 12.3 11.5 - 14.5 %    PLATELET 71 (L) 915 - 400 K/uL    MPV 11.1 8.9 - 12.9 FL    NRBC 0.0 0  WBC    ABSOLUTE NRBC 0.00 0.00 - 0.01 K/uL    NEUTROPHILS 73 32 - 75 %    LYMPHOCYTES 10 (L) 12 - 49 %    MONOCYTES 13 5 - 13 %    EOSINOPHILS 4 0 - 7 %    BASOPHILS 0 0 - 1 %    IMMATURE GRANULOCYTES 0 0.0 - 0.5 %    ABS. NEUTROPHILS 2.4 1.8 - 8.0 K/UL    ABS. LYMPHOCYTES 0.3 (L) 0.8 - 3.5 K/UL    ABS. MONOCYTES 0.4 0.0 - 1.0 K/UL    ABS. EOSINOPHILS 0.1 0.0 - 0.4 K/UL    ABS. BASOPHILS 0.0 0.0 - 0.1 K/UL    ABS. IMM.  GRANS. 0.0 0.00 - 0.04 K/UL    DF SMEAR SCANNED      RBC COMMENTS NORMOCYTIC, NORMOCHROMIC     METABOLIC PANEL, COMPREHENSIVE    Collection Time: 20  1:12 AM   Result Value Ref Range    Sodium 134 (L) 136 - 145 mmol/L    Potassium 3.4 (L) 3.5 - 5.1 mmol/L    Chloride 101 97 - 108 mmol/L    CO2 26 21 - 32 mmol/L    Anion gap 7 5 - 15 mmol/L    Glucose 103 (H) 65 - 100 mg/dL    BUN 10 6 - 20 MG/DL    Creatinine 0.59 (L) 0.70 - 1.30 MG/DL    BUN/Creatinine ratio 17 12 - 20      GFR est AA >60 >60 ml/min/1.73m2    GFR est non-AA >60 >60 ml/min/1.73m2    Calcium 8.1 (L) 8.5 - 10.1 MG/DL    Bilirubin, total 1.8 (H) 0.2 - 1.0 MG/DL    ALT (SGPT) 30 12 - 78 U/L    AST (SGOT) 16 15 - 37 U/L    Alk.  phosphatase 144 (H) 45 - 117 U/L    Protein, total 5.1 (L) 6.4 - 8.2 g/dL    Albumin 2.5 (L) 3.5 - 5.0 g/dL    Globulin 2.6 2.0 - 4.0 g/dL    A-G Ratio 1.0 (L) 1.1 - 2.2           Assessment:     Hospital Problems  Date Reviewed: 7/6/2020          Codes Class Noted POA    Nausea & vomiting ICD-10-CM: R11.2  ICD-9-CM: 787.01  9/2/2020 Unknown        Abdominal pain ICD-10-CM: R10.9  ICD-9-CM: 789.00  12/31/2019 Unknown              Plan/Recommendations/Medical Decision Making:     - Clears and advance to full liquids tomorrow  - Plan for gastrojejunostomy conversion to George-en-y this Wednesday with Dr. Deb Dejesus  - PPI and pain control  - Hold off on TPN for now

## 2020-09-05 NOTE — ROUTINE PROCESS
Bedside shift change report given to fam ha rn (oncoming nurse) by carlos rn (offgoing nurse). Report included the following information SBAR and Kardex.

## 2020-09-05 NOTE — PROGRESS NOTES
Bedside shift change report given to Smith Beltrán (oncoming nurse) by Rebeka Dunaway (offgoing nurse). Report included the following information SBAR, Kardex, MAR and Recent Results.

## 2020-09-05 NOTE — PROGRESS NOTES
[address] [facility] Adult  Hospitalist Group                                                                                          Hospitalist Progress Note  Jay Patel MD  Answering service: [phone] from in house phone        Date of Service:  2020  NAME:  Geoff Kyle. :  1955  MRN:  [id number]      Admission Summary:     Patient reports that he started experiencing abdominal pain associated with nausea, vomiting that started about 6 days back. Patient reports that for the past few days the pain has been getting worse, he has been having increased nausea, multiple bouts of vomiting, poor appetite, not able to tolerate p.o. Patient reports that he thinks he has another bout of pancreatitis. Patient reports that he has been taking oxycodone, tramadol, fentanyl patch and antiemetics at home but they have not been working. Patient reports that today he got concerned because he was not able to tolerate p.o., his epigastric pain persisted and he decided to come to the hospital patient was found to have mildly elevated bilirubin and was requested to be admitted under the hospitalist service. Interval history / Subjective:       Patient still with abdominal pain in epigastric area that radiates to the back. Lower abdominal pain is improving.      Assessment & Plan:     Abdominal pain  -Acute onset abdominal pain, CT without acute pathology  -History of chronic pancreatitis secondary to pancreatic duct stricture, prior stent in the duct was removed in 2020  -GI following, for EUS guided plexus block on 2020  -General surgery evaluating the patient, plan for revision of George-en-Y 2020    Abnormal LFTs  -Elevated alk phos and T bili, normal transaminases  -MRCP shows no issues with biliary ducts    Hydronephrosis  -Left hydronephrosis, but no other obstruction noted on CT  -MRCP showing left renal vein occlusion with retroperitoneal edema  -Urology following  -Plan for ultrasound in the a.m. for reevaluation, possible stent placement if hydro-still persists    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  -Following oncology as outpatient, currently with radiation therapy    BPH  -Continue home medications    Code status: FULL  DVT prophylaxis: Heparin    Care Plan discussed with: Patient/Family  Anticipated Disposition: Home w/Family  Anticipated Discharge: Greater than 48 hours     Hospital Problems  Date Reviewed: 7/6/2020          Codes Class Noted POA    Nausea & vomiting ICD-10-CM: R11.2  ICD-9-CM: 787.01  9/2/2020 Unknown        Abdominal pain ICD-10-CM: R10.9  ICD-9-CM: 789.00  12/31/2019 Unknown                Review of Systems:   A comprehensive review of systems was negative except for that written in the HPI. Vital Signs:    Last 24hrs VS reviewed since prior progress note. Most recent are:  Visit Vitals  BP 97/62 (BP 1 Location: Right arm, BP Patient Position: At rest)   Pulse 64   Temp 97.9 °F (36.6 °C)   Resp 18   Ht 5' 8\" (1.727 m)   Wt 68.4 kg (150 lb 11.2 oz)   SpO2 96%   BMI 22.91 kg/m²         Intake/Output Summary (Last 24 hours) at 9/5/2020 1337  Last data filed at 9/4/2020 1431  Gross per 24 hour   Intake 240 ml   Output    Net 240 ml        Physical Examination:             Constitutional:  No acute distress, cooperative, pleasant    ENT:  Oral mucosa moist, oropharynx benign. Resp:  CTA bilaterally. No wheezing/rhonchi/rales. No accessory muscle use   CV:  Regular rhythm, normal rate, no murmurs, gallops, rubs    GI:  Soft, non distended, non tender. normoactive bowel sounds, no hepatosplenomegaly     Musculoskeletal:  No edema, warm, 2+ pulses throughout    Neurologic:  Moves all extremities.   AAOx3, CN II-XII reviewed     Skin:  Good turgor, no rashes or ulcers       Data Review:    Review and/or order of clinical lab test      Labs:     Recent Labs     09/05/20  1161 09/04/20  0353   WBC 3.2* 5.1   HGB 11.8* 11.8*   HCT 35.0* 35.1*   PLT 71* 75*     Recent Labs     09/05/20  0112 09/04/20  0353 09/03/20  0304   * 134* 138   K 3.4* 3.7 3.6    101 104   CO2 26 27 25   BUN 10 15 14   CREA 0.59* 0.68* 0.67*   * 103* 115*   CA 8.1* 8.2* 8.7     Recent Labs     09/05/20  0112 09/04/20  1310 09/04/20  0353 09/03/20  0304   ALT 30  --  31 44   *  --  138* 160*   TBILI 1.8* 2.7* 2.9* 3.5*   TP 5.1*  --  4.8* 5.6*   ALB 2.5*  --  2.4* 2.9*   GLOB 2.6  --  2.4 2.7     No results for input(s): INR, PTP, APTT, INREXT, INREXT in the last 72 hours. No results for input(s): FE, TIBC, PSAT, FERR in the last 72 hours. No results found for: FOL, RBCF   No results for input(s): PH, PCO2, PO2 in the last 72 hours. No results for input(s): CPK, CKNDX, TROIQ in the last 72 hours.     No lab exists for component: CPKMB  Lab Results   Component Value Date/Time    Cholesterol, total 81 09/02/2020 07:54 AM    HDL Cholesterol 49 09/02/2020 07:54 AM    LDL, calculated 22.6 09/02/2020 07:54 AM    Triglyceride 47 09/02/2020 07:54 AM    CHOL/HDL Ratio 1.7 09/02/2020 07:54 AM     Lab Results   Component Value Date/Time    Glucose (POC) 120 (H) 01/02/2020 11:47 AM    Glucose (POC) 124 (H) 01/02/2020 06:48 AM    Glucose (POC) 109 (H) 01/01/2020 10:04 PM    Glucose (POC) 204 (H) 01/01/2020 05:09 PM    Glucose (POC) 129 (H) 01/01/2020 11:52 AM     Lab Results   Component Value Date/Time    Color DARK YELLOW 09/03/2020 07:05 AM    Appearance CLEAR 09/03/2020 07:05 AM    Specific gravity 1.025 09/03/2020 07:05 AM    Specific gravity 1.027 08/16/2018 08:56 AM    pH (UA) 6.0 09/03/2020 07:05 AM    Protein TRACE (A) 09/03/2020 07:05 AM    Glucose Negative 09/03/2020 07:05 AM    Ketone 40 (A) 09/03/2020 07:05 AM    Bilirubin NEGATIVE  12/31/2019 08:12 AM    Urobilinogen 4.0 (H) 09/03/2020 07:05 AM    Nitrites Negative 09/03/2020 07:05 AM    Leukocyte Esterase TRACE (A) 09/03/2020 07:05 AM Epithelial cells FEW 09/03/2020 07:05 AM    Bacteria Negative 09/03/2020 07:05 AM    WBC 0-4 09/03/2020 07:05 AM    RBC 20-50 09/03/2020 07:05 AM         Medications Reviewed:     Current Facility-Administered Medications   Medication Dose Route Frequency    prochlorperazine (COMPAZINE) with saline injection 5 mg  5 mg IntraVENous Q4H PRN    fentaNYL (DURAGESIC) 12 mcg/hr patch 1 Patch  1 Patch TransDERmal Q72H    fentaNYL (DURAGESIC) 25 mcg/hr patch 1 Patch  1 Patch TransDERmal Q72H    HYDROmorphone (DILAUDID) tablet 2-4 mg  2-4 mg Oral Q4H PRN    senna-docusate (PERICOLACE) 8.6-50 mg per tablet 2 Tab  2 Tab Oral DAILY    polyethylene glycol (MIRALAX) packet 17 g  17 g Oral DAILY    heparin (porcine) injection 5,000 Units  5,000 Units SubCUTAneous Q12H    HYDROmorphone (PF) (DILAUDID) injection 2 mg  2 mg IntraVENous Q3H PRN    sodium chloride (NS) flush 5-40 mL  5-40 mL IntraVENous Q8H    sodium chloride (NS) flush 5-40 mL  5-40 mL IntraVENous PRN    ondansetron (ZOFRAN) injection 4 mg  4 mg IntraVENous Q4H PRN    pantoprazole (PROTONIX) 40 mg in 0.9% sodium chloride 10 mL injection  40 mg IntraVENous Q24H    dutasteride (AVODART) capsule 0.5 mg  0.5 mg Oral DAILY    finasteride (PROSCAR) tablet 5 mg  5 mg Oral DAILY    lipase-protease-amylase (CREON 12,000) capsule 6 Cap  6 Cap Oral TID WITH MEALS    tamsulosin (FLOMAX) capsule 0.4 mg  0.4 mg Oral DAILY     ______________________________________________________________________  EXPECTED LENGTH OF STAY: 2d 14h  ACTUAL LENGTH OF STAY:          1                 Ana Martinez MD

## 2020-09-05 NOTE — PROGRESS NOTES
Patient: Nallely Kay. MRN: [id number]  SSN: [id number]    YOB: 1955  Age: 62 y.o. Sex: male        ADMITTED: 2020 to Zane Huyhn MD by Derek Landers MD for Nausea & vomiting [R11.2]  Abdominal pain [R10.9]  POD# * No surgery date entered * Procedure(s):  Bautista Mcpherson 94 TO LOLI-EN Y, POSSIBLE OPEN (URGENT)    Nallely Kay. is doing well states pain unchanged . Discussed plan for repeat U/S to assess L  Hydronephrosis  And possible stent placement next week . Vitals: Temp (24hrs), Av.3 °F (36.8 °C), Min:97.9 °F (36.6 °C), Max:98.6 °F (37 °C)    Blood pressure 97/62, pulse 64, temperature 97.9 °F (36.6 °C), resp. rate 18, height 5' 8\" (1.727 m), weight 68.4 kg (150 lb 11.2 oz), SpO2 96 %. Intake and Output:   1901 -  0700  In: 480 [P.O.:480]  Out: -   No intake/output data recorded. CHRIS Output lats 24 hrs: No data found. CHRIS Output last 8 hrs: No data found. BM over last 24 hrs: No data found.     Exam:   EXAMINATION:    Appearance: well-developed NAD    Conjunctiva/Lids: conjunctivae and lids normal   Pupil/Iris: pupils equal, round    External Ears/Nose: normal no lesions or deformities   Hearing: grossly intact   Neck:  supple    Thyroid: no enlargement   Respiratory Effort:  breathing easily   Abdominal Aorta: no enlargement   Lower Extremity: no edema   Abdomen/Flank: soft non-tender without masses; no CVA tenderness, Pernell back pain     Liver/Spleen: no organomegaly   Hernia: no ventral hernia   Lymph: no adenopathy    Gait/Station: normal    Digits/Nails: no clubbing cyanosis petechiae   Skin Inspection: warm and dry    Skin Palpation:  no SQ nodularity   Sensation:  no sensory deficits   Judgment/Insight: intact   Mood/Affect: normal                                                                                                                 Labs:  CBC:   Lab Results   Component Value Date/Time    WBC 3.2 (L) 2020 01:12 AM    HCT 35.0 (L) 09/05/2020 01:12 AM    PLATELET 71 (L) 68/57/9575 01:12 AM     BMP:   Lab Results   Component Value Date/Time    Glucose 103 (H) 09/05/2020 01:12 AM    Sodium 134 (L) 09/05/2020 01:12 AM    Potassium 3.4 (L) 09/05/2020 01:12 AM    Chloride 101 09/05/2020 01:12 AM    CO2 26 09/05/2020 01:12 AM    BUN 10 09/05/2020 01:12 AM    Creatinine 0.59 (L) 09/05/2020 01:12 AM    Calcium 8.1 (L) 09/05/2020 01:12 AM     Cultures: N/A    Imaging:    CT:   Results for orders placed during the hospital encounter of 09/02/20   CT ABD PELV WO CONT    Narrative EXAM: CT ABD PELV WO CONT    INDICATION: Evaluation for renal or ureteral stones    COMPARISON: CT from the prior day    CONTRAST:  None. TECHNIQUE:   Thin axial images were obtained through the abdomen and pelvis. Coronal and  sagittal reformats were generated. Oral contrast was not administered. CT dose  reduction was achieved through use of a standardized protocol tailored for this  examination and automatic exposure control for dose modulation. The absence of intravenous contrast material reduces the sensitivity for  evaluation of the vasculature and solid organs. FINDINGS:   LOWER THORAX: No significant abnormality in the incidentally imaged lower chest.  LIVER: No mass. BILIARY TREE: Prior cholecystectomy. Mild pneumobilia. CBD is not dilated. SPLEEN: within normal limits. PANCREAS: No focal abnormality. ADRENALS: Unremarkable. KIDNEYS/URETERS: Nonobstructing 2 and 3 mm calculi. Left hydronephrosis and  hydroureter to the level of the pelvis. STOMACH: Unremarkable. SMALL BOWEL: No dilatation or wall thickening. COLON: No dilatation or wall thickening. Sigmoid diverticulosis. APPENDIX:  PERITONEUM: No ascites or pneumoperitoneum. RETROPERITONEUM: No lymphadenopathy or aortic aneurysm. REPRODUCTIVE ORGANS: Prostatomegaly with prostate calcifications  URINARY BLADDER: No mass or calculus. BONES: No destructive bone lesion.   ABDOMINAL WALL: No mass or hernia. ADDITIONAL COMMENTS: N/A      Impression IMPRESSION:  Nonobstructing right renal calculus  Left hydronephrosis and hydroureter but no distal ureteral calculus  Incidental sigmoid diverticulosis  Mild prostatomegaly     Assessment/Plan:     1. L Hydronephrosis - will re evaluate with U/S in am . Possible stent placement if hydro still present . Urology will attempt to coordinate with Surgery if possible . Signed By: Salena Castelan NP - September 5, 2020

## 2020-09-05 NOTE — PROGRESS NOTES
[address] [place] [address], [address] [place] [address]    GI PROGRESS NOTE    NAME: Will Gordon. :  1955   MRN:  [id number]       Subjective:     Doing better, tolerating po ensure  Review of Systems    Constitutional: negative fever, negative chills, negative weight loss  Eyes:   negative visual changes  ENT:   negative sore throat, tongue or lip swelling  Respiratory:  negative cough, negative dyspnea  Cards:  negative for chest pain, palpitations, lower extremity edema  GI:   See HPI  :  negative for frequency, dysuria  Integument:  negative for rash and pruritus  Heme:  negative for easy bruising and gum/nose bleeding  Musculoskel: negative for myalgias,  back pain and muscle weakness  Neuro: negative for headaches, dizziness, vertigo  Psych:  negative for feelings of anxiety, depression         Objective:     VITALS:   Last 24hrs VS reviewed since prior progress note. Most recent are:  Visit Vitals  BP 97/62 (BP 1 Location: Right arm, BP Patient Position: At rest)   Pulse 64   Temp 97.9 °F (36.6 °C)   Resp 18   Ht 5' 8\" (1.727 m)   Wt 68.4 kg (150 lb 11.2 oz)   SpO2 96%   BMI 22.91 kg/m²     No intake or output data in the 24 hours ending 20 1502  PHYSICAL EXAM:  General: WD, WN. Alert, cooperative, no acute distress    HEENT: NC, Atraumatic. PERRLA, EOMI. Anicteric sclerae. Lungs:  CTA Bilaterally. No Wheezing/Rhonchi/Rales. Heart:  Regular  rhythm,  No murmur (), No Rubs, No Gallops  Abdomen: Soft, Non distended, Non tender.  +Bowel sounds, no HSM  Extremities: No c/c/e  Neurologic:  CN 2-12 gi, Alert and oriented X 3. No acute neurological distress   Psych:   Good insight. Not anxious nor agitated.     Lab Data Reviewed:   Recent Labs     20  0353   WBC 3.2* 5.1   HGB 11.8* 11.8*   HCT 35.0* 35.1*   PLT 71* 75*     Recent Labs     20  0353   * 134*   K 3.4* 3.7    101   CO2 26 27   BUN 10 15   CREA 0.59* 0.68*   * 103*   CA 8.1* 8.2*     Recent Labs     09/05/20  0112 09/04/20  0353   * 138*   TP 5.1* 4.8*   ALB 2.5* 2.4*   GLOB 2.6 2.4       ________________________________________________________________________       Assessment:   · Epigastric pain     Patient Active Problem List   Diagnosis Code    Obstructive jaundice K83.1    Common bile duct (CBD) obstruction K83.1    UTI (urinary tract infection) N39.0    Cholangiocarcinoma of biliary tract (HCC) C22.1    Cholangiocarcinoma determined by biopsy of biliary tract (HCC) C24.9    Paroxysmal atrial fibrillation (HCC) I48.0    S/P ablation of atrial fibrillation Z98.890, Z86.79    Essential hypertension I10    Cramps, muscle, general R25.2    Low vitamin D level R79.89    Low vitamin B12 level E53.8    Vomiting R11.10    Controlled type 2 diabetes mellitus without complication, without long-term current use of insulin (HCC) E11.9    Acute pancreatitis K85.90    Leukocytosis D72.829    Pancreatitis K85.90    Abdominal pain R10.9    Nausea & vomiting R11.2     Plan:   · EUS with celiac block next week  · Pain management  · Will follow as needed this weekend     Signed By: Destiney Luna MD     9/5/2020  3:02 PM

## 2020-09-05 NOTE — PROGRESS NOTES
Problem: Falls - Risk of  Goal: *Absence of Falls  Description: Document René Kevin Fall Risk and appropriate interventions in the flowsheet.   Outcome: Progressing Towards Goal  Note: Fall Risk Interventions:    Problem: Patient Education: Go to Patient Education Activity  Goal: Patient/Family Education  Outcome: Progressing Towards Goal     Problem: Pain  Goal: *Control of Pain  Outcome: Progressing Towards Goal  Goal: *PALLIATIVE CARE:  Alleviation of Pain  Outcome: Progressing Towards Goal     Problem: Diarrhea (Adult and Pediatrics)  Goal: *Absence of diarrhea  Outcome: Progressing Towards Goal     Problem: Nausea/Vomiting (Adult)  Goal: *Absence of nausea/vomiting  Outcome: Progressing Towards Goal     Medication Interventions: Teach patient to arise slowly

## 2020-09-05 NOTE — PROGRESS NOTES
Bedside and Verbal shift change report given to Kassandra (oncoming nurse) by Mónica Day (offgoing nurse). Report included the following information SBAR, Kardex and MAR.

## 2020-09-06 ENCOUNTER — APPOINTMENT (OUTPATIENT)
Dept: ULTRASOUND IMAGING | Age: 65
DRG: 423 | End: 2020-09-06
Attending: SURGERY
Payer: COMMERCIAL

## 2020-09-06 LAB
ALBUMIN SERPL-MCNC: 2.5 G/DL (ref 3.5–5)
ALBUMIN/GLOB SERPL: 1 {RATIO} (ref 1.1–2.2)
ALP SERPL-CCNC: 192 U/L (ref 45–117)
ALT SERPL-CCNC: 46 U/L (ref 12–78)
ANION GAP SERPL CALC-SCNC: 6 MMOL/L (ref 5–15)
AST SERPL-CCNC: 38 U/L (ref 15–37)
BASOPHILS # BLD: 0 K/UL (ref 0–0.1)
BASOPHILS NFR BLD: 0 % (ref 0–1)
BILIRUB SERPL-MCNC: 1.3 MG/DL (ref 0.2–1)
BUN SERPL-MCNC: 5 MG/DL (ref 6–20)
BUN/CREAT SERPL: 9 (ref 12–20)
CALCIUM SERPL-MCNC: 8.5 MG/DL (ref 8.5–10.1)
CHLORIDE SERPL-SCNC: 104 MMOL/L (ref 97–108)
CO2 SERPL-SCNC: 28 MMOL/L (ref 21–32)
CREAT SERPL-MCNC: 0.56 MG/DL (ref 0.7–1.3)
DIFFERENTIAL METHOD BLD: ABNORMAL
EOSINOPHIL # BLD: 0.2 K/UL (ref 0–0.4)
EOSINOPHIL NFR BLD: 8 % (ref 0–7)
ERYTHROCYTE [DISTWIDTH] IN BLOOD BY AUTOMATED COUNT: 12.4 % (ref 11.5–14.5)
GLOBULIN SER CALC-MCNC: 2.6 G/DL (ref 2–4)
GLUCOSE SERPL-MCNC: 98 MG/DL (ref 65–100)
HCT VFR BLD AUTO: 37.2 % (ref 36.6–50.3)
HGB BLD-MCNC: 12.4 G/DL (ref 12.1–17)
IMM GRANULOCYTES # BLD AUTO: 0 K/UL (ref 0–0.04)
IMM GRANULOCYTES NFR BLD AUTO: 0 % (ref 0–0.5)
LYMPHOCYTES # BLD: 0.5 K/UL (ref 0.8–3.5)
LYMPHOCYTES NFR BLD: 17 % (ref 12–49)
MCH RBC QN AUTO: 31.4 PG (ref 26–34)
MCHC RBC AUTO-ENTMCNC: 33.3 G/DL (ref 30–36.5)
MCV RBC AUTO: 94.2 FL (ref 80–99)
MONOCYTES # BLD: 0.5 K/UL (ref 0–1)
MONOCYTES NFR BLD: 17 % (ref 5–13)
NEUTS SEG # BLD: 1.5 K/UL (ref 1.8–8)
NEUTS SEG NFR BLD: 58 % (ref 32–75)
NRBC # BLD: 0 K/UL (ref 0–0.01)
NRBC BLD-RTO: 0 PER 100 WBC
PLATELET # BLD AUTO: 88 K/UL (ref 150–400)
PMV BLD AUTO: 11.2 FL (ref 8.9–12.9)
POTASSIUM SERPL-SCNC: 3.2 MMOL/L (ref 3.5–5.1)
PROT SERPL-MCNC: 5.1 G/DL (ref 6.4–8.2)
RBC # BLD AUTO: 3.95 M/UL (ref 4.1–5.7)
RBC MORPH BLD: ABNORMAL
SODIUM SERPL-SCNC: 138 MMOL/L (ref 136–145)
WBC # BLD AUTO: 2.7 K/UL (ref 4.1–11.1)

## 2020-09-06 PROCEDURE — 74011250637 HC RX REV CODE- 250/637: Performed by: FAMILY MEDICINE

## 2020-09-06 PROCEDURE — 65270000029 HC RM PRIVATE

## 2020-09-06 PROCEDURE — 80053 COMPREHEN METABOLIC PANEL: CPT

## 2020-09-06 PROCEDURE — 36415 COLL VENOUS BLD VENIPUNCTURE: CPT

## 2020-09-06 PROCEDURE — 74011250636 HC RX REV CODE- 250/636: Performed by: FAMILY MEDICINE

## 2020-09-06 PROCEDURE — 74011000250 HC RX REV CODE- 250: Performed by: FAMILY MEDICINE

## 2020-09-06 PROCEDURE — 85025 COMPLETE CBC W/AUTO DIFF WBC: CPT

## 2020-09-06 PROCEDURE — 74011250636 HC RX REV CODE- 250/636: Performed by: INTERNAL MEDICINE

## 2020-09-06 PROCEDURE — 76770 US EXAM ABDO BACK WALL COMP: CPT

## 2020-09-06 PROCEDURE — C9113 INJ PANTOPRAZOLE SODIUM, VIA: HCPCS | Performed by: FAMILY MEDICINE

## 2020-09-06 RX ORDER — POTASSIUM CHLORIDE 750 MG/1
20 TABLET, FILM COATED, EXTENDED RELEASE ORAL DAILY
Status: COMPLETED | OUTPATIENT
Start: 2020-09-06 | End: 2020-09-08

## 2020-09-06 RX ADMIN — HYDROMORPHONE HYDROCHLORIDE 2 MG: 1 INJECTION, SOLUTION INTRAMUSCULAR; INTRAVENOUS; SUBCUTANEOUS at 10:51

## 2020-09-06 RX ADMIN — HEPARIN SODIUM 5000 UNITS: 5000 INJECTION INTRAVENOUS; SUBCUTANEOUS at 07:22

## 2020-09-06 RX ADMIN — Medication 10 ML: at 06:00

## 2020-09-06 RX ADMIN — POTASSIUM CHLORIDE 20 MEQ: 750 TABLET, FILM COATED, EXTENDED RELEASE ORAL at 15:32

## 2020-09-06 RX ADMIN — ONDANSETRON 4 MG: 2 INJECTION INTRAMUSCULAR; INTRAVENOUS at 17:56

## 2020-09-06 RX ADMIN — HYDROMORPHONE HYDROCHLORIDE 2 MG: 1 INJECTION, SOLUTION INTRAMUSCULAR; INTRAVENOUS; SUBCUTANEOUS at 04:29

## 2020-09-06 RX ADMIN — Medication 10 ML: at 14:49

## 2020-09-06 RX ADMIN — Medication 10 ML: at 21:18

## 2020-09-06 RX ADMIN — PANCRELIPASE 6 CAPSULE: 60000; 12000; 38000 CAPSULE, DELAYED RELEASE PELLETS ORAL at 12:00

## 2020-09-06 RX ADMIN — PANCRELIPASE 3 CAPSULE: 60000; 12000; 38000 CAPSULE, DELAYED RELEASE PELLETS ORAL at 17:56

## 2020-09-06 RX ADMIN — SODIUM CHLORIDE 5 MG: 9 INJECTION INTRAMUSCULAR; INTRAVENOUS; SUBCUTANEOUS at 07:40

## 2020-09-06 RX ADMIN — HEPARIN SODIUM 5000 UNITS: 5000 INJECTION INTRAVENOUS; SUBCUTANEOUS at 17:56

## 2020-09-06 RX ADMIN — HYDROMORPHONE HYDROCHLORIDE 2 MG: 1 INJECTION, SOLUTION INTRAMUSCULAR; INTRAVENOUS; SUBCUTANEOUS at 14:50

## 2020-09-06 RX ADMIN — DUTASTERIDE 0.5 MG: 0.5 CAPSULE, LIQUID FILLED ORAL at 10:51

## 2020-09-06 RX ADMIN — HYDROMORPHONE HYDROCHLORIDE 2 MG: 1 INJECTION, SOLUTION INTRAMUSCULAR; INTRAVENOUS; SUBCUTANEOUS at 21:14

## 2020-09-06 RX ADMIN — FINASTERIDE 5 MG: 5 TABLET, FILM COATED ORAL at 10:51

## 2020-09-06 RX ADMIN — SODIUM CHLORIDE 40 MG: 9 INJECTION INTRAMUSCULAR; INTRAVENOUS; SUBCUTANEOUS at 14:50

## 2020-09-06 RX ADMIN — HYDROMORPHONE HYDROCHLORIDE 2 MG: 1 INJECTION, SOLUTION INTRAMUSCULAR; INTRAVENOUS; SUBCUTANEOUS at 07:40

## 2020-09-06 RX ADMIN — SODIUM CHLORIDE 5 MG: 9 INJECTION INTRAMUSCULAR; INTRAVENOUS; SUBCUTANEOUS at 21:14

## 2020-09-06 RX ADMIN — HYDROMORPHONE HYDROCHLORIDE 2 MG: 1 INJECTION, SOLUTION INTRAMUSCULAR; INTRAVENOUS; SUBCUTANEOUS at 18:02

## 2020-09-06 RX ADMIN — PANCRELIPASE 6 CAPSULE: 60000; 12000; 38000 CAPSULE, DELAYED RELEASE PELLETS ORAL at 07:21

## 2020-09-06 RX ADMIN — TAMSULOSIN HYDROCHLORIDE 0.4 MG: 0.4 CAPSULE ORAL at 10:51

## 2020-09-06 RX ADMIN — ONDANSETRON 4 MG: 2 INJECTION INTRAMUSCULAR; INTRAVENOUS at 10:51

## 2020-09-06 RX ADMIN — ONDANSETRON 4 MG: 2 INJECTION INTRAMUSCULAR; INTRAVENOUS at 04:29

## 2020-09-06 RX ADMIN — SODIUM CHLORIDE 5 MG: 9 INJECTION INTRAMUSCULAR; INTRAVENOUS; SUBCUTANEOUS at 14:50

## 2020-09-06 NOTE — ROUTINE PROCESS
Bedside shift change report given to Brianna Gillespie (oncoming nurse) by Marina James (offgoing nurse). Report included the following information SBAR, Kardex, MAR and Recent Results.

## 2020-09-06 NOTE — PROGRESS NOTES
Problem: Falls - Risk of  Goal: *Absence of Falls  Description: Document Gordo Mann Fall Risk and appropriate interventions in the flowsheet.   Outcome: Progressing Towards Goal  Note: Fall Risk Interventions:    Problem: Patient Education: Go to Patient Education Activity  Goal: Patient/Family Education  Outcome: Progressing Towards Goal     Problem: Diarrhea (Adult and Pediatrics)  Goal: *Absence of diarrhea  Outcome: Progressing Towards Goal     Problem: Nausea/Vomiting (Adult)  Goal: *Absence of nausea/vomiting  Outcome: Progressing Towards Goal     Problem: Pain  Goal: *Control of Pain  Outcome: Progressing Towards Goal  Goal: *PALLIATIVE CARE:  Alleviation of Pain  Outcome: Progressing Towards Goal     Medication Interventions: Teach patient to arise slowly

## 2020-09-06 NOTE — PROGRESS NOTES
Progress Note    Patient: Obi Forte. MRN: [id number]  SSN: [id number]    YOB: 1955  Age: 62 y.o. Sex: male      Admit Date: 2020    * No surgery date entered *    Procedure:  Procedure(s):  LAPAROSCOPIC REVISION OF GASTROJEJUNOSTOMY TO LOLI-EN Y, POSSIBLE OPEN (URGENT)    Subjective:     No acute surgical issues. Pt is doing well. Tolerating full liquid diet. Pain is under control. Pt is passing flatus and having BM. Objective:     Visit Vitals  BP 97/59 (BP 1 Location: Right arm, BP Patient Position: At rest)   Pulse 82   Temp 98.6 °F (37 °C)   Resp 16   Ht 5' 8\" (1.727 m)   Wt 150 lb 11.2 oz (68.4 kg)   SpO2 95%   BMI 22.91 kg/m²       Temp (24hrs), Av.2 °F (36.8 °C), Min:97.6 °F (36.4 °C), Max:98.6 °F (37 °C)        Physical Exam:    Gen:  NAD  Pulm:  Unlabored  Abd:  S/ND/Non-TTP    Recent Results (from the past 24 hour(s))   METABOLIC PANEL, COMPREHENSIVE    Collection Time: 20  4:41 AM   Result Value Ref Range    Sodium 138 136 - 145 mmol/L    Potassium 3.2 (L) 3.5 - 5.1 mmol/L    Chloride 104 97 - 108 mmol/L    CO2 28 21 - 32 mmol/L    Anion gap 6 5 - 15 mmol/L    Glucose 98 65 - 100 mg/dL    BUN 5 (L) 6 - 20 MG/DL    Creatinine 0.56 (L) 0.70 - 1.30 MG/DL    BUN/Creatinine ratio 9 (L) 12 - 20      GFR est AA >60 >60 ml/min/1.73m2    GFR est non-AA >60 >60 ml/min/1.73m2    Calcium 8.5 8.5 - 10.1 MG/DL    Bilirubin, total 1.3 (H) 0.2 - 1.0 MG/DL    ALT (SGPT) 46 12 - 78 U/L    AST (SGOT) 38 (H) 15 - 37 U/L    Alk.  phosphatase 192 (H) 45 - 117 U/L    Protein, total 5.1 (L) 6.4 - 8.2 g/dL    Albumin 2.5 (L) 3.5 - 5.0 g/dL    Globulin 2.6 2.0 - 4.0 g/dL    A-G Ratio 1.0 (L) 1.1 - 2.2     CBC WITH AUTOMATED DIFF    Collection Time: 20  4:41 AM   Result Value Ref Range    WBC 2.7 (L) 4.1 - 11.1 K/uL    RBC 3.95 (L) 4.10 - 5.70 M/uL    HGB 12.4 12.1 - 17.0 g/dL    HCT 37.2 36.6 - 50.3 %    MCV 94.2 80.0 - 99.0 FL    MCH 31.4 26.0 - 34.0 PG    MCHC 33.3 30.0 - 36.5 g/dL    RDW 12.4 11.5 - 14.5 %    PLATELET 88 (L) 438 - 400 K/uL    MPV 11.2 8.9 - 12.9 FL    NRBC 0.0 0  WBC    ABSOLUTE NRBC 0.00 0.00 - 0.01 K/uL    NEUTROPHILS 58 32 - 75 %    LYMPHOCYTES 17 12 - 49 %    MONOCYTES 17 (H) 5 - 13 %    EOSINOPHILS 8 (H) 0 - 7 %    BASOPHILS 0 0 - 1 %    IMMATURE GRANULOCYTES 0 0.0 - 0.5 %    ABS. NEUTROPHILS 1.5 (L) 1.8 - 8.0 K/UL    ABS. LYMPHOCYTES 0.5 (L) 0.8 - 3.5 K/UL    ABS. MONOCYTES 0.5 0.0 - 1.0 K/UL    ABS. EOSINOPHILS 0.2 0.0 - 0.4 K/UL    ABS. BASOPHILS 0.0 0.0 - 0.1 K/UL    ABS. IMM.  GRANS. 0.0 0.00 - 0.04 K/UL    DF SMEAR SCANNED      RBC COMMENTS NORMOCYTIC, NORMOCHROMIC           Assessment:     Hospital Problems  Date Reviewed: 7/6/2020          Codes Class Noted POA    Nausea & vomiting ICD-10-CM: R11.2  ICD-9-CM: 787.01  9/2/2020 Unknown        Abdominal pain ICD-10-CM: R10.9  ICD-9-CM: 789.00  12/31/2019 Unknown              Plan/Recommendations/Medical Decision Making:     - Advance to full liquids tomorrow  - Plan for gastrojejunostomy conversion to George-en-y this Wednesday with Dr. Aiyana Kevin  - PPI and pain control  - Hold off on TPN for now  - Renal US per Urology for possible stenting of hydronephrosis

## 2020-09-06 NOTE — PROGRESS NOTES
Bedside shift change report given to Shauna Natarajan (oncoming nurse) by Veto Ambriz (offgoing nurse). Report included the following information SBAR, Kardex, MAR and Recent Results.

## 2020-09-06 NOTE — PROGRESS NOTES
[facility]  Hospitalist Group                                                                                          Hospitalist Progress Note  Cathy Bustos MD  Answering service: [phone] from in house phone        Date of Service:  2020  NAME:  Lorenzo Li. :  1955  MRN:  [id number]      Admission Summary:     Patient reports that he started experiencing abdominal pain associated with nausea, vomiting that started about 6 days back. Patient reports that for the past few days the pain has been getting worse, he has been having increased nausea, multiple bouts of vomiting, poor appetite, not able to tolerate p.o. Patient reports that he thinks he has another bout of pancreatitis. Patient reports that he has been taking oxycodone, tramadol, fentanyl patch and antiemetics at home but they have not been working. Patient reports that today he got concerned because he was not able to tolerate p.o., his epigastric pain persisted and he decided to come to the hospital patient was found to have mildly elevated bilirubin and was requested to be admitted under the hospitalist service. Interval history / Subjective:       Patient still with abdominal pain in epigastric area that radiates to the back. Lower abdominal pain is improving.      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  -Urology following  -Plan for ultrasound today for reevaluation, possible stent placement if hydro-still persists    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  -Following oncology as outpatient, currently with radiation therapy    BPH  -Continue home medications    Code status: FULL  DVT prophylaxis: Heparin    Care Plan discussed with: Patient/Family  Anticipated Disposition: Home w/Family  Anticipated Discharge: Greater than 48 hours     Hospital Problems  Date Reviewed: 7/6/2020          Codes Class Noted POA    Nausea & vomiting ICD-10-CM: R11.2  ICD-9-CM: 787.01  9/2/2020 Unknown        Abdominal pain ICD-10-CM: R10.9  ICD-9-CM: 789.00  12/31/2019 Unknown                Review of Systems:   A comprehensive review of systems was negative except for that written in the HPI. Vital Signs:    Last 24hrs VS reviewed since prior progress note. Most recent are:  Visit Vitals  BP 95/63 (BP 1 Location: Right arm)   Pulse 64   Temp 98.2 °F (36.8 °C)   Resp 16   Ht 5' 8\" (1.727 m)   Wt 68.4 kg (150 lb 11.2 oz)   SpO2 97%   BMI 22.91 kg/m²       No intake or output data in the 24 hours ending 09/06/20 1354     Physical Examination:             Constitutional:  No acute distress, cooperative, pleasant    ENT:  Oral mucosa moist, oropharynx benign. Resp:  CTA bilaterally. No wheezing/rhonchi/rales. No accessory muscle use   CV:  Regular rhythm, normal rate, no murmurs, gallops, rubs    GI:  Soft, non distended, non tender. normoactive bowel sounds, no hepatosplenomegaly     Musculoskeletal:  No edema, warm, 2+ pulses throughout    Neurologic:  Moves all extremities.   AAOx3, CN II-XII reviewed     Skin:  Good turgor, no rashes or ulcers       Data Review:    Review and/or order of clinical lab test      Labs:     Recent Labs     09/06/20 0441 09/05/20  0112   WBC 2.7* 3.2*   HGB 12.4 11.8*   HCT 37.2 35.0*   PLT 88* 71*     Recent Labs     09/06/20  0441 09/05/20  0112 09/04/20  0353    134* 134*   K 3.2* 3.4* 3.7    101 101   CO2 28 26 27   BUN 5* 10 15   CREA 0.56* 0.59* 0.68*   GLU 98 103* 103*   CA 8.5 8.1* 8.2*     Recent Labs     09/06/20  0441 09/05/20  0112 09/04/20  1310 09/04/20  0353   ALT 46 30  --  31   * 144*  --  138*   TBILI 1.3* 1.8* 2.7* 2.9*   TP 5.1* 5.1*  --  4.8*   ALB 2.5* 2.5*  --  2.4*   GLOB 2.6 2.6  --  2.4     No results for input(s): INR, PTP, APTT, INREXT, INREXT in the last 72 hours. No results for input(s): FE, TIBC, PSAT, FERR in the last 72 hours. No results found for: FOL, RBCF   No results for input(s): PH, PCO2, PO2 in the last 72 hours. No results for input(s): CPK, CKNDX, TROIQ in the last 72 hours.     No lab exists for component: CPKMB  Lab Results   Component Value Date/Time    Cholesterol, total 81 09/02/2020 07:54 AM    HDL Cholesterol 49 09/02/2020 07:54 AM    LDL, calculated 22.6 09/02/2020 07:54 AM    Triglyceride 47 09/02/2020 07:54 AM    CHOL/HDL Ratio 1.7 09/02/2020 07:54 AM     Lab Results   Component Value Date/Time    Glucose (POC) 120 (H) 01/02/2020 11:47 AM    Glucose (POC) 124 (H) 01/02/2020 06:48 AM    Glucose (POC) 109 (H) 01/01/2020 10:04 PM    Glucose (POC) 204 (H) 01/01/2020 05:09 PM    Glucose (POC) 129 (H) 01/01/2020 11:52 AM     Lab Results   Component Value Date/Time    Color DARK YELLOW 09/03/2020 07:05 AM    Appearance CLEAR 09/03/2020 07:05 AM    Specific gravity 1.025 09/03/2020 07:05 AM    Specific gravity 1.027 08/16/2018 08:56 AM    pH (UA) 6.0 09/03/2020 07:05 AM    Protein TRACE (A) 09/03/2020 07:05 AM    Glucose Negative 09/03/2020 07:05 AM    Ketone 40 (A) 09/03/2020 07:05 AM    Bilirubin NEGATIVE  12/31/2019 08:12 AM    Urobilinogen 4.0 (H) 09/03/2020 07:05 AM    Nitrites Negative 09/03/2020 07:05 AM    Leukocyte Esterase TRACE (A) 09/03/2020 07:05 AM    Epithelial cells FEW 09/03/2020 07:05 AM    Bacteria Negative 09/03/2020 07:05 AM    WBC 0-4 09/03/2020 07:05 AM    RBC 20-50 09/03/2020 07:05 AM Medications Reviewed:     Current Facility-Administered Medications   Medication Dose Route Frequency    potassium chloride SR (KLOR-CON 10) tablet 20 mEq  20 mEq Oral DAILY    prochlorperazine (COMPAZINE) with saline injection 5 mg  5 mg IntraVENous Q4H PRN    loperamide (IMODIUM) capsule 4 mg  4 mg Oral Q4H PRN    fentaNYL (DURAGESIC) 12 mcg/hr patch 1 Patch  1 Patch TransDERmal Q72H    fentaNYL (DURAGESIC) 25 mcg/hr patch 1 Patch  1 Patch TransDERmal Q72H    HYDROmorphone (DILAUDID) tablet 2-4 mg  2-4 mg Oral Q4H PRN    senna-docusate (PERICOLACE) 8.6-50 mg per tablet 2 Tab  2 Tab Oral DAILY    polyethylene glycol (MIRALAX) packet 17 g  17 g Oral DAILY    heparin (porcine) injection 5,000 Units  5,000 Units SubCUTAneous Q12H    HYDROmorphone (PF) (DILAUDID) injection 2 mg  2 mg IntraVENous Q3H PRN    sodium chloride (NS) flush 5-40 mL  5-40 mL IntraVENous Q8H    sodium chloride (NS) flush 5-40 mL  5-40 mL IntraVENous PRN    ondansetron (ZOFRAN) injection 4 mg  4 mg IntraVENous Q4H PRN    pantoprazole (PROTONIX) 40 mg in 0.9% sodium chloride 10 mL injection  40 mg IntraVENous Q24H    dutasteride (AVODART) capsule 0.5 mg  0.5 mg Oral DAILY    finasteride (PROSCAR) tablet 5 mg  5 mg Oral DAILY    lipase-protease-amylase (CREON 12,000) capsule 6 Cap  6 Cap Oral TID WITH MEALS    tamsulosin (FLOMAX) capsule 0.4 mg  0.4 mg Oral DAILY     ______________________________________________________________________  EXPECTED LENGTH OF STAY: 2d 14h  ACTUAL LENGTH OF STAY:          2                 Meng Andrew MD

## 2020-09-06 NOTE — PROGRESS NOTES
Problem: Falls - Risk of  Goal: *Absence of Falls  Description: Document Amalia May Fall Risk and appropriate interventions in the flowsheet.   Outcome: Progressing Towards Goal  Note: Fall Risk Interventions:            Medication Interventions: Teach patient to arise slowly                   Problem: Patient Education: Go to Patient Education Activity  Goal: Patient/Family Education  Outcome: Progressing Towards Goal     Problem: Discharge Planning  Goal: *Discharge to safe environment  Outcome: Progressing Towards Goal     Problem: Pain  Goal: *Control of Pain  Outcome: Progressing Towards Goal  Goal: *PALLIATIVE CARE:  Alleviation of Pain  Outcome: Progressing Towards Goal     Problem: Pain  Goal: *Control of Pain  Outcome: Progressing Towards Goal  Goal: *PALLIATIVE CARE:  Alleviation of Pain  Outcome: Progressing Towards Goal     Problem: Diarrhea (Adult and Pediatrics)  Goal: *Absence of diarrhea  Outcome: Progressing Towards Goal     Problem: Patient Education: Go to Patient Education Activity  Goal: Patient/Family Education  Outcome: Progressing Towards Goal     Problem: Nausea/Vomiting (Adult)  Goal: *Absence of nausea/vomiting  Outcome: Progressing Towards Goal

## 2020-09-07 LAB
ALBUMIN SERPL-MCNC: 2.5 G/DL (ref 3.5–5)
ALBUMIN/GLOB SERPL: 0.9 {RATIO} (ref 1.1–2.2)
ALP SERPL-CCNC: 231 U/L (ref 45–117)
ALT SERPL-CCNC: 62 U/L (ref 12–78)
ANION GAP SERPL CALC-SCNC: 5 MMOL/L (ref 5–15)
AST SERPL-CCNC: 46 U/L (ref 15–37)
BASOPHILS # BLD: 0 K/UL (ref 0–0.1)
BASOPHILS NFR BLD: 1 % (ref 0–1)
BILIRUB SERPL-MCNC: 1 MG/DL (ref 0.2–1)
BUN SERPL-MCNC: 5 MG/DL (ref 6–20)
BUN/CREAT SERPL: 9 (ref 12–20)
CALCIUM SERPL-MCNC: 8.4 MG/DL (ref 8.5–10.1)
CHLORIDE SERPL-SCNC: 105 MMOL/L (ref 97–108)
CO2 SERPL-SCNC: 28 MMOL/L (ref 21–32)
CREAT SERPL-MCNC: 0.56 MG/DL (ref 0.7–1.3)
DIFFERENTIAL METHOD BLD: ABNORMAL
EOSINOPHIL # BLD: 0.3 K/UL (ref 0–0.4)
EOSINOPHIL NFR BLD: 9 % (ref 0–7)
ERYTHROCYTE [DISTWIDTH] IN BLOOD BY AUTOMATED COUNT: 12.4 % (ref 11.5–14.5)
GLOBULIN SER CALC-MCNC: 2.7 G/DL (ref 2–4)
GLUCOSE SERPL-MCNC: 98 MG/DL (ref 65–100)
HCT VFR BLD AUTO: 36.4 % (ref 36.6–50.3)
HGB BLD-MCNC: 12 G/DL (ref 12.1–17)
IMM GRANULOCYTES # BLD AUTO: 0 K/UL (ref 0–0.04)
IMM GRANULOCYTES NFR BLD AUTO: 0 % (ref 0–0.5)
LYMPHOCYTES # BLD: 0.6 K/UL (ref 0.8–3.5)
LYMPHOCYTES NFR BLD: 16 % (ref 12–49)
MCH RBC QN AUTO: 31.2 PG (ref 26–34)
MCHC RBC AUTO-ENTMCNC: 33 G/DL (ref 30–36.5)
MCV RBC AUTO: 94.5 FL (ref 80–99)
MONOCYTES # BLD: 0.5 K/UL (ref 0–1)
MONOCYTES NFR BLD: 14 % (ref 5–13)
NEUTS SEG # BLD: 2.2 K/UL (ref 1.8–8)
NEUTS SEG NFR BLD: 61 % (ref 32–75)
NRBC # BLD: 0 K/UL (ref 0–0.01)
NRBC BLD-RTO: 0 PER 100 WBC
PLATELET # BLD AUTO: 109 K/UL (ref 150–400)
PMV BLD AUTO: 11.1 FL (ref 8.9–12.9)
POTASSIUM SERPL-SCNC: 3.4 MMOL/L (ref 3.5–5.1)
PROT SERPL-MCNC: 5.2 G/DL (ref 6.4–8.2)
RBC # BLD AUTO: 3.85 M/UL (ref 4.1–5.7)
SODIUM SERPL-SCNC: 138 MMOL/L (ref 136–145)
VIT A SERPL-MCNC: 5.3 UG/DL (ref 22–69.5)
VITAMIN E/ALPHA-TOCOPHEROL: 3.8 MG/L (ref 9–29)
VITAMIN E/GAMMA-TOCOPHEROL: 0.6 MG/L (ref 0.5–4.9)
WBC # BLD AUTO: 3.6 K/UL (ref 4.1–11.1)

## 2020-09-07 PROCEDURE — 85025 COMPLETE CBC W/AUTO DIFF WBC: CPT

## 2020-09-07 PROCEDURE — 65270000029 HC RM PRIVATE

## 2020-09-07 PROCEDURE — 36415 COLL VENOUS BLD VENIPUNCTURE: CPT

## 2020-09-07 PROCEDURE — 74011250636 HC RX REV CODE- 250/636: Performed by: INTERNAL MEDICINE

## 2020-09-07 PROCEDURE — 74011000250 HC RX REV CODE- 250: Performed by: FAMILY MEDICINE

## 2020-09-07 PROCEDURE — 80053 COMPREHEN METABOLIC PANEL: CPT

## 2020-09-07 PROCEDURE — 74011250636 HC RX REV CODE- 250/636: Performed by: FAMILY MEDICINE

## 2020-09-07 PROCEDURE — 74011250637 HC RX REV CODE- 250/637: Performed by: FAMILY MEDICINE

## 2020-09-07 PROCEDURE — C9113 INJ PANTOPRAZOLE SODIUM, VIA: HCPCS | Performed by: FAMILY MEDICINE

## 2020-09-07 PROCEDURE — 74011250637 HC RX REV CODE- 250/637: Performed by: INTERNAL MEDICINE

## 2020-09-07 RX ORDER — ACETAMINOPHEN 325 MG/1
650 TABLET ORAL
Status: DISCONTINUED | OUTPATIENT
Start: 2020-09-07 | End: 2020-09-11 | Stop reason: HOSPADM

## 2020-09-07 RX ADMIN — ONDANSETRON 4 MG: 2 INJECTION INTRAMUSCULAR; INTRAVENOUS at 06:44

## 2020-09-07 RX ADMIN — PANCRELIPASE 6 CAPSULE: 60000; 12000; 38000 CAPSULE, DELAYED RELEASE PELLETS ORAL at 17:35

## 2020-09-07 RX ADMIN — POTASSIUM CHLORIDE 20 MEQ: 750 TABLET, FILM COATED, EXTENDED RELEASE ORAL at 09:21

## 2020-09-07 RX ADMIN — HYDROMORPHONE HYDROCHLORIDE 2 MG: 1 INJECTION, SOLUTION INTRAMUSCULAR; INTRAVENOUS; SUBCUTANEOUS at 17:34

## 2020-09-07 RX ADMIN — ONDANSETRON 4 MG: 2 INJECTION INTRAMUSCULAR; INTRAVENOUS at 14:04

## 2020-09-07 RX ADMIN — PANCRELIPASE 6 CAPSULE: 60000; 12000; 38000 CAPSULE, DELAYED RELEASE PELLETS ORAL at 09:22

## 2020-09-07 RX ADMIN — LOPERAMIDE HYDROCHLORIDE 4 MG: 2 CAPSULE ORAL at 10:27

## 2020-09-07 RX ADMIN — HYDROMORPHONE HYDROCHLORIDE 2 MG: 1 INJECTION, SOLUTION INTRAMUSCULAR; INTRAVENOUS; SUBCUTANEOUS at 06:44

## 2020-09-07 RX ADMIN — SODIUM CHLORIDE 40 MG: 9 INJECTION INTRAMUSCULAR; INTRAVENOUS; SUBCUTANEOUS at 14:04

## 2020-09-07 RX ADMIN — HYDROMORPHONE HYDROCHLORIDE 2 MG: 1 INJECTION, SOLUTION INTRAMUSCULAR; INTRAVENOUS; SUBCUTANEOUS at 00:15

## 2020-09-07 RX ADMIN — TAMSULOSIN HYDROCHLORIDE 0.4 MG: 0.4 CAPSULE ORAL at 09:21

## 2020-09-07 RX ADMIN — HYDROMORPHONE HYDROCHLORIDE 2 MG: 1 INJECTION, SOLUTION INTRAMUSCULAR; INTRAVENOUS; SUBCUTANEOUS at 20:26

## 2020-09-07 RX ADMIN — HYDROMORPHONE HYDROCHLORIDE 2 MG: 1 INJECTION, SOLUTION INTRAMUSCULAR; INTRAVENOUS; SUBCUTANEOUS at 03:17

## 2020-09-07 RX ADMIN — HYDROMORPHONE HYDROCHLORIDE 2 MG: 1 INJECTION, SOLUTION INTRAMUSCULAR; INTRAVENOUS; SUBCUTANEOUS at 10:27

## 2020-09-07 RX ADMIN — Medication 10 ML: at 23:17

## 2020-09-07 RX ADMIN — PANCRELIPASE 6 CAPSULE: 60000; 12000; 38000 CAPSULE, DELAYED RELEASE PELLETS ORAL at 12:49

## 2020-09-07 RX ADMIN — DUTASTERIDE 0.5 MG: 0.5 CAPSULE, LIQUID FILLED ORAL at 10:26

## 2020-09-07 RX ADMIN — HYDROMORPHONE HYDROCHLORIDE 2 MG: 1 INJECTION, SOLUTION INTRAMUSCULAR; INTRAVENOUS; SUBCUTANEOUS at 14:04

## 2020-09-07 RX ADMIN — Medication 10 ML: at 06:44

## 2020-09-07 RX ADMIN — SODIUM CHLORIDE 5 MG: 9 INJECTION INTRAMUSCULAR; INTRAVENOUS; SUBCUTANEOUS at 10:27

## 2020-09-07 RX ADMIN — HYDROMORPHONE HYDROCHLORIDE 2 MG: 1 INJECTION, SOLUTION INTRAMUSCULAR; INTRAVENOUS; SUBCUTANEOUS at 23:17

## 2020-09-07 RX ADMIN — FINASTERIDE 5 MG: 5 TABLET, FILM COATED ORAL at 10:26

## 2020-09-07 RX ADMIN — ONDANSETRON 4 MG: 2 INJECTION INTRAMUSCULAR; INTRAVENOUS at 20:26

## 2020-09-07 RX ADMIN — SODIUM CHLORIDE 5 MG: 9 INJECTION INTRAMUSCULAR; INTRAVENOUS; SUBCUTANEOUS at 17:34

## 2020-09-07 RX ADMIN — HEPARIN SODIUM 5000 UNITS: 5000 INJECTION INTRAVENOUS; SUBCUTANEOUS at 17:34

## 2020-09-07 RX ADMIN — HEPARIN SODIUM 5000 UNITS: 5000 INJECTION INTRAVENOUS; SUBCUTANEOUS at 06:44

## 2020-09-07 RX ADMIN — SODIUM CHLORIDE 5 MG: 9 INJECTION INTRAMUSCULAR; INTRAVENOUS; SUBCUTANEOUS at 23:17

## 2020-09-07 RX ADMIN — Medication 10 ML: at 14:12

## 2020-09-07 RX ADMIN — ONDANSETRON 4 MG: 2 INJECTION INTRAMUSCULAR; INTRAVENOUS at 00:28

## 2020-09-07 RX ADMIN — SODIUM CHLORIDE 5 MG: 9 INJECTION INTRAMUSCULAR; INTRAVENOUS; SUBCUTANEOUS at 03:17

## 2020-09-07 NOTE — PROGRESS NOTES
Problem: Falls - Risk of  Goal: *Absence of Falls  Description: Document Anton Murray Fall Risk and appropriate interventions in the flowsheet.   Outcome: Progressing Towards Goal  Note: Fall Risk Interventions:            Medication Interventions: Teach patient to arise slowly                   Problem: Patient Education: Go to Patient Education Activity  Goal: Patient/Family Education  Outcome: Progressing Towards Goal     Problem: Discharge Planning  Goal: *Discharge to safe environment  Outcome: Progressing Towards Goal     Problem: Pain  Goal: *Control of Pain  Outcome: Progressing Towards Goal  Goal: *PALLIATIVE CARE:  Alleviation of Pain  Outcome: Progressing Towards Goal     Problem: Diarrhea (Adult and Pediatrics)  Goal: *Absence of diarrhea  Outcome: Progressing Towards Goal     Problem: Patient Education: Go to Patient Education Activity  Goal: Patient/Family Education  Outcome: Progressing Towards Goal     Problem: Nausea/Vomiting (Adult)  Goal: *Absence of nausea/vomiting  Outcome: Progressing Towards Goal     Problem: Nutrition Deficit  Goal: *Optimize nutritional status  Outcome: Progressing Towards Goal

## 2020-09-07 NOTE — PROGRESS NOTES
[facility]      Subjective     No acute events overnight. Patient states his left groin and abdominal pain have resolved. Still having back pain. U/S last night showed no further hydronephrosis. He has not vomited in a couple of days and is feeling hungry. Having bowel function. Objective     Patient Vitals for the past 24 hrs:   Temp Pulse Resp BP SpO2   09/07/20 1502 98.6 °F (37 °C) 86 18 104/66 96 %   09/07/20 1410 98.9 °F (37.2 °C)       09/07/20 1346 100 °F (37.8 °C) 85 18 110/58 96 %   09/07/20 0827 98.7 °F (37.1 °C) 78 18 97/60 97 %   09/07/20 0323 98.5 °F (36.9 °C) 71 16 94/56 95 %   09/06/20 2033 98.5 °F (36.9 °C) 69 16 100/65 95 %       Date 09/06/20 0700 - 09/07/20 0659 09/07/20 0700 - 09/08/20 0659   Shift 6178-1878 5069-1455 24 Hour Total 4985-7743 8995-1419 24 Hour Total   INTAKE   Shift Total(mL/kg)         OUTPUT   Urine(mL/kg/hr)           Urine Occurrence(s)  1 x 1 x      Stool           Stool Occurrence(s)  1 x 1 x      Shift Total(mL/kg)         NET         Weight (kg) 68.4 68.4 68.4 68.4 68.4 68.4       PE  GEN - Awake, alert, communicating appropriately. NAD  Pulm - CTAB  CV - RRR  Abd - soft, ND,  NT. Ext - warm, well perfused. Labs  Recent Results (from the past 24 hour(s))   METABOLIC PANEL, COMPREHENSIVE    Collection Time: 09/07/20  3:30 AM   Result Value Ref Range    Sodium 138 136 - 145 mmol/L    Potassium 3.4 (L) 3.5 - 5.1 mmol/L    Chloride 105 97 - 108 mmol/L    CO2 28 21 - 32 mmol/L    Anion gap 5 5 - 15 mmol/L    Glucose 98 65 - 100 mg/dL    BUN 5 (L) 6 - 20 MG/DL    Creatinine 0.56 (L) 0.70 - 1.30 MG/DL    BUN/Creatinine ratio 9 (L) 12 - 20      GFR est AA >60 >60 ml/min/1.73m2    GFR est non-AA >60 >60 ml/min/1.73m2    Calcium 8.4 (L) 8.5 - 10.1 MG/DL    Bilirubin, total 1.0 0.2 - 1.0 MG/DL    ALT (SGPT) 62 12 - 78 U/L    AST (SGOT) 46 (H) 15 - 37 U/L    Alk.  phosphatase 231 (H) 45 - 117 U/L    Protein, total 5.2 (L) 6.4 - 8.2 g/dL    Albumin 2.5 (L) 3.5 - 5.0 g/dL    Globulin 2.7 2.0 - 4.0 g/dL    A-G Ratio 0.9 (L) 1.1 - 2.2     CBC WITH AUTOMATED DIFF    Collection Time: 09/07/20  3:30 AM   Result Value Ref Range    WBC 3.6 (L) 4.1 - 11.1 K/uL    RBC 3.85 (L) 4.10 - 5.70 M/uL    HGB 12.0 (L) 12.1 - 17.0 g/dL    HCT 36.4 (L) 36.6 - 50.3 %    MCV 94.5 80.0 - 99.0 FL    MCH 31.2 26.0 - 34.0 PG    MCHC 33.0 30.0 - 36.5 g/dL    RDW 12.4 11.5 - 14.5 %    PLATELET 925 (L) 787 - 400 K/uL    MPV 11.1 8.9 - 12.9 FL    NRBC 0.0 0  WBC    ABSOLUTE NRBC 0.00 0.00 - 0.01 K/uL    NEUTROPHILS 61 32 - 75 %    LYMPHOCYTES 16 12 - 49 %    MONOCYTES 14 (H) 5 - 13 %    EOSINOPHILS 9 (H) 0 - 7 %    BASOPHILS 1 0 - 1 %    IMMATURE GRANULOCYTES 0 0.0 - 0.5 %    ABS. NEUTROPHILS 2.2 1.8 - 8.0 K/UL    ABS. LYMPHOCYTES 0.6 (L) 0.8 - 3.5 K/UL    ABS. MONOCYTES 0.5 0.0 - 1.0 K/UL    ABS. EOSINOPHILS 0.3 0.0 - 0.4 K/UL    ABS. BASOPHILS 0.0 0.0 - 0.1 K/UL    ABS. IMM. GRANS. 0.0 0.00 - 0.04 K/UL    DF AUTOMATED       MRI Results (most recent):  Results from Hospital Encounter encounter on 09/02/20   MRI ABD W MRCP W WO CONT    Narrative MRI ABDOMEN AND MRCP WITH AND WITHOUT CONTRAST. 9/3/2020 11:15 PM    INDICATION: Elevated bilirubin. History of Whipple for cholangiocarcinoma. Stricture of the pancreatic anastomosis. COMPARISON: CT abdomen pelvis 9/3/2020, 9/2/2020, 12/4/2019. TECHNIQUE: Multisequence and multiplanar MRI of the abdomen was performed after  the administration of 7 mL of Gadavist (gadobutrol)  IV contrast. Images were  obtained without contrast; and in late arterial, portal venous, and delayed  phases after the administration of contrast.     Heavily T2-weighted thick slab and thin slice images were obtained in the  oblique coronal plane through the biliary tree (MRCP). FINDINGS:   Recurrence: Abnormal soft tissue centered between the common hepatic artery and  the SMA has increased.  On 12/4/2019, the SMA and common hepatic artery where  encased in the celiac artery was abutted. It measured approximately 16 x 44 x 24  mm in greatest dimensions Evaluation at that time was compounded by superimposed  acute pancreatitis, however. On today's examination and on 9/2/2020, it measured  34 x 47 x 58 mm. This spiculated, T1 hypointense mass shows slight enhancement on delayed phases  (13-67, 10-67). It now encases the superior mesenteric artery and narrows it  from the origin over greater than 2 cm. There is poststenotic dilation of the  midportion. The celiac and common hepatic arteries are encased but not narrowed. The bilateral renal arteries are encased and more mildly narrowed. The left  renal vein is obliterated, and the left kidney drains via collaterals to the  left gonadal splenic, and lumbar veins. The aorta is abutted over 180 degrees. This represents recurrent tumor until proven otherwise. Retroperitoneal fibrosis  is a less likely possibility. Abdomen: There is heterogeneous perfusion throughout the liver on arterial  phase, with no correlate on portal venous phase, and delayed phase, or  T2-weighted images. This is more than typically seen as perfusion anomalies. It  is indeterminate, but of questionable significance. The portal and splenic veins remain dilated. No downstream narrowing. No overt  cirrhosis. No superior mesenteric vein dilation. Moderate left retroperitoneal edema is likely due to renal vein occlusion. A  trace left pleural effusion and trace left paracolic ascites are also  associated. No pancreatic or biliary duct dilation. Incidental note is made of left renal  cysts and lower thoracic nerve root sleeve cysts. The distal esophagus, stomach,  gastrojejunostomy, biliary limb, spleen, adrenals, and kidneys are otherwise  normal. Descending diverticulosis is mild. Post L5-S1 fusion. Impression IMPRESSION:   1.  Perivascular recurrence in the superior retroperitoneum extends around the  celiac, common hepatic, superior mesenteric, and bilateral renal arteries. 2. Left renal vein occlusion. Moderate consequent, left retroperitoneal edema,  trace ascites, and trace left pleural effusion. 3. Narrowing of the origin and proximal SMA. Poststenotic dilation of the  midportion. 4. More mild narrowing of the bilateral renal arteries. CT Results (most recent):  Results from Hospital Encounter encounter on 09/02/20   CT ABD PELV WO CONT    Narrative EXAM: CT ABD PELV WO CONT    INDICATION: Evaluation for renal or ureteral stones    COMPARISON: CT from the prior day    CONTRAST:  None. TECHNIQUE:   Thin axial images were obtained through the abdomen and pelvis. Coronal and  sagittal reformats were generated. Oral contrast was not administered. CT dose  reduction was achieved through use of a standardized protocol tailored for this  examination and automatic exposure control for dose modulation. The absence of intravenous contrast material reduces the sensitivity for  evaluation of the vasculature and solid organs. FINDINGS:   LOWER THORAX: No significant abnormality in the incidentally imaged lower chest.  LIVER: No mass. BILIARY TREE: Prior cholecystectomy. Mild pneumobilia. CBD is not dilated. SPLEEN: within normal limits. PANCREAS: No focal abnormality. ADRENALS: Unremarkable. KIDNEYS/URETERS: Nonobstructing 2 and 3 mm calculi. Left hydronephrosis and  hydroureter to the level of the pelvis. STOMACH: Unremarkable. SMALL BOWEL: No dilatation or wall thickening. COLON: No dilatation or wall thickening. Sigmoid diverticulosis. APPENDIX:  PERITONEUM: No ascites or pneumoperitoneum. RETROPERITONEUM: No lymphadenopathy or aortic aneurysm. REPRODUCTIVE ORGANS: Prostatomegaly with prostate calcifications  URINARY BLADDER: No mass or calculus. BONES: No destructive bone lesion. ABDOMINAL WALL: No mass or hernia.   ADDITIONAL COMMENTS: N/A Impression IMPRESSION:  Nonobstructing right renal calculus  Left hydronephrosis and hydroureter but no distal ureteral calculus  Incidental sigmoid diverticulosis  Mild prostatomegaly         Assessment     Maribel Wells Merryl Morning. is a 62 y. 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 He now presents with acutely worsened abdominal pain and ongoing bilious emesis. Plan     - I think his acute exacerbation of pain and nausea was likely a passed kidney stone. This appears to be resolved. His residual back pain is likely from his recurrent disease around the celiac plexus/retroperitoneum.    - Diet advanced to GI lite  - Dr. Raphael Gomez for celiac plexus block on Wednesday to assist with pain control from tumor recurrence in that area. 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 - Etiology of his elevated bilirubin is unconjugated suggesting either Gilbert's syndrome or possibly a hemolytic source. No signs of biliary obstruction on imaging either.    - No pancreatic ductal dilation noted on MRCP, so I would recommend leaving this anastomosis alone currently.       Hellen Tate MD  9/7/2020  3:42 PM

## 2020-09-07 NOTE — PROGRESS NOTES
Bedside and Verbal shift change report given to Kassandra (oncoming nurse) by Cary Cervantes (offgoing nurse). Report included the following information SBAR, Kardex and MAR.

## 2020-09-07 NOTE — PROGRESS NOTES
[address] [facility] Adult  Hospitalist Group                                                                                          Hospitalist Progress Note  Jay Patel MD  Answering service: [phone] from in house phone        Date of Service:  2020  NAME:  Geoff Kyle. :  1955  MRN:  [id number]      Admission Summary:     Patient reports that he started experiencing abdominal pain associated with nausea, vomiting that started about 6 days back. Patient reports that for the past few days the pain has been getting worse, he has been having increased nausea, multiple bouts of vomiting, poor appetite, not able to tolerate p.o. Patient reports that he thinks he has another bout of pancreatitis. Patient reports that he has been taking oxycodone, tramadol, fentanyl patch and antiemetics at home but they have not been working. Patient reports that today he got concerned because he was not able to tolerate p.o., his epigastric pain persisted and he decided to come to the hospital patient was found to have mildly elevated bilirubin and was requested to be admitted under the hospitalist service. Interval history / Subjective:       Patient still with abdominal pain in epigastric area that radiates to the back. Lower abdominal pain is improving.      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  -Urology following  -Plan for ultrasound today for reevaluation, possible stent placement if hydro-still persists    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  -Following oncology as outpatient, currently with radiation therapy    BPH  -Continue home medications    Chronic diarrhea  -Imodium as needed    Code status: FULL  DVT prophylaxis: Heparin    Care Plan discussed with: Patient/Family  Anticipated Disposition: Home w/Family  Anticipated Discharge: Greater than 48 hours     Hospital Problems  Date Reviewed: 7/6/2020          Codes Class Noted POA    Nausea & vomiting ICD-10-CM: R11.2  ICD-9-CM: 787.01  9/2/2020 Unknown        Abdominal pain ICD-10-CM: R10.9  ICD-9-CM: 789.00  12/31/2019 Unknown                Review of Systems:   A comprehensive review of systems was negative except for that written in the HPI. Vital Signs:    Last 24hrs VS reviewed since prior progress note. Most recent are:  Visit Vitals  BP 97/60 (BP 1 Location: Left arm, BP Patient Position: At rest)   Pulse 78   Temp 98.7 °F (37.1 °C)   Resp 18   Ht 5' 8\" (1.727 m)   Wt 68.4 kg (150 lb 11.2 oz)   SpO2 97%   BMI 22.91 kg/m²       No intake or output data in the 24 hours ending 09/07/20 1128     Physical Examination:             Constitutional:  No acute distress, cooperative, pleasant    ENT:  Oral mucosa moist, oropharynx benign. Resp:  CTA bilaterally. No wheezing/rhonchi/rales. No accessory muscle use   CV:  Regular rhythm, normal rate, no murmurs, gallops, rubs    GI:  Soft, non distended, non tender. normoactive bowel sounds, no hepatosplenomegaly     Musculoskeletal:  No edema, warm, 2+ pulses throughout    Neurologic:  Moves all extremities.   AAOx3, CN II-XII reviewed     Skin:  Good turgor, no rashes or ulcers       Data Review:    Review and/or order of clinical lab test      Labs:     Recent Labs     09/07/20  0330 09/06/20  0441   WBC 3.6* 2.7*   HGB 12.0* 12.4   HCT 36.4* 37.2   * 88* Recent Labs     09/07/20  0330 09/06/20  0441 09/05/20  0112    138 134*   K 3.4* 3.2* 3.4*    104 101   CO2 28 28 26   BUN 5* 5* 10   CREA 0.56* 0.56* 0.59*   GLU 98 98 103*   CA 8.4* 8.5 8.1*     Recent Labs     09/07/20  0330 09/06/20 0441 09/05/20  0112   ALT 62 46 30   * 192* 144*   TBILI 1.0 1.3* 1.8*   TP 5.2* 5.1* 5.1*   ALB 2.5* 2.5* 2.5*   GLOB 2.7 2.6 2.6     No results for input(s): INR, PTP, APTT, INREXT, INREXT in the last 72 hours. No results for input(s): FE, TIBC, PSAT, FERR in the last 72 hours. No results found for: FOL, RBCF   No results for input(s): PH, PCO2, PO2 in the last 72 hours. No results for input(s): CPK, CKNDX, TROIQ in the last 72 hours.     No lab exists for component: CPKMB  Lab Results   Component Value Date/Time    Cholesterol, total 81 09/02/2020 07:54 AM    HDL Cholesterol 49 09/02/2020 07:54 AM    LDL, calculated 22.6 09/02/2020 07:54 AM    Triglyceride 47 09/02/2020 07:54 AM    CHOL/HDL Ratio 1.7 09/02/2020 07:54 AM     Lab Results   Component Value Date/Time    Glucose (POC) 120 (H) 01/02/2020 11:47 AM    Glucose (POC) 124 (H) 01/02/2020 06:48 AM    Glucose (POC) 109 (H) 01/01/2020 10:04 PM    Glucose (POC) 204 (H) 01/01/2020 05:09 PM    Glucose (POC) 129 (H) 01/01/2020 11:52 AM     Lab Results   Component Value Date/Time    Color DARK YELLOW 09/03/2020 07:05 AM    Appearance CLEAR 09/03/2020 07:05 AM    Specific gravity 1.025 09/03/2020 07:05 AM    Specific gravity 1.027 08/16/2018 08:56 AM    pH (UA) 6.0 09/03/2020 07:05 AM    Protein TRACE (A) 09/03/2020 07:05 AM    Glucose Negative 09/03/2020 07:05 AM    Ketone 40 (A) 09/03/2020 07:05 AM    Bilirubin NEGATIVE  12/31/2019 08:12 AM    Urobilinogen 4.0 (H) 09/03/2020 07:05 AM    Nitrites Negative 09/03/2020 07:05 AM    Leukocyte Esterase TRACE (A) 09/03/2020 07:05 AM    Epithelial cells FEW 09/03/2020 07:05 AM    Bacteria Negative 09/03/2020 07:05 AM    WBC 0-4 09/03/2020 07:05 AM    RBC 20-50 09/03/2020 07:05 AM         Medications Reviewed:     Current Facility-Administered Medications   Medication Dose Route Frequency    potassium chloride SR (KLOR-CON 10) tablet 20 mEq  20 mEq Oral DAILY    prochlorperazine (COMPAZINE) with saline injection 5 mg  5 mg IntraVENous Q4H PRN    loperamide (IMODIUM) capsule 4 mg  4 mg Oral Q4H PRN    fentaNYL (DURAGESIC) 12 mcg/hr patch 1 Patch  1 Patch TransDERmal Q72H    fentaNYL (DURAGESIC) 25 mcg/hr patch 1 Patch  1 Patch TransDERmal Q72H    HYDROmorphone (DILAUDID) tablet 2-4 mg  2-4 mg Oral Q4H PRN    senna-docusate (PERICOLACE) 8.6-50 mg per tablet 2 Tab  2 Tab Oral DAILY    polyethylene glycol (MIRALAX) packet 17 g  17 g Oral DAILY    heparin (porcine) injection 5,000 Units  5,000 Units SubCUTAneous Q12H    HYDROmorphone (PF) (DILAUDID) injection 2 mg  2 mg IntraVENous Q3H PRN    sodium chloride (NS) flush 5-40 mL  5-40 mL IntraVENous Q8H    sodium chloride (NS) flush 5-40 mL  5-40 mL IntraVENous PRN    ondansetron (ZOFRAN) injection 4 mg  4 mg IntraVENous Q4H PRN    pantoprazole (PROTONIX) 40 mg in 0.9% sodium chloride 10 mL injection  40 mg IntraVENous Q24H    dutasteride (AVODART) capsule 0.5 mg  0.5 mg Oral DAILY    finasteride (PROSCAR) tablet 5 mg  5 mg Oral DAILY    lipase-protease-amylase (CREON 12,000) capsule 6 Cap  6 Cap Oral TID WITH MEALS    tamsulosin (FLOMAX) capsule 0.4 mg  0.4 mg Oral DAILY     ______________________________________________________________________  EXPECTED LENGTH OF STAY: 2d 14h  ACTUAL LENGTH OF STAY:          3                 Hillary Nunn MD

## 2020-09-08 LAB
ALBUMIN SERPL-MCNC: 2.8 G/DL (ref 3.5–5)
ALBUMIN/GLOB SERPL: 1 {RATIO} (ref 1.1–2.2)
ALP SERPL-CCNC: 280 U/L (ref 45–117)
ALT SERPL-CCNC: 68 U/L (ref 12–78)
ANION GAP SERPL CALC-SCNC: 6 MMOL/L (ref 5–15)
AST SERPL-CCNC: 30 U/L (ref 15–37)
BILIRUB SERPL-MCNC: 1.3 MG/DL (ref 0.2–1)
BUN SERPL-MCNC: 5 MG/DL (ref 6–20)
BUN/CREAT SERPL: 8 (ref 12–20)
CALCIUM SERPL-MCNC: 8.7 MG/DL (ref 8.5–10.1)
CHLORIDE SERPL-SCNC: 103 MMOL/L (ref 97–108)
CO2 SERPL-SCNC: 27 MMOL/L (ref 21–32)
CREAT SERPL-MCNC: 0.66 MG/DL (ref 0.7–1.3)
GLOBULIN SER CALC-MCNC: 2.9 G/DL (ref 2–4)
GLUCOSE SERPL-MCNC: 114 MG/DL (ref 65–100)
POTASSIUM SERPL-SCNC: 3.7 MMOL/L (ref 3.5–5.1)
PROT SERPL-MCNC: 5.7 G/DL (ref 6.4–8.2)
SODIUM SERPL-SCNC: 136 MMOL/L (ref 136–145)

## 2020-09-08 PROCEDURE — 74011000250 HC RX REV CODE- 250: Performed by: FAMILY MEDICINE

## 2020-09-08 PROCEDURE — 74011250637 HC RX REV CODE- 250/637: Performed by: FAMILY MEDICINE

## 2020-09-08 PROCEDURE — 74011250637 HC RX REV CODE- 250/637: Performed by: INTERNAL MEDICINE

## 2020-09-08 PROCEDURE — 36415 COLL VENOUS BLD VENIPUNCTURE: CPT

## 2020-09-08 PROCEDURE — 99232 SBSQ HOSP IP/OBS MODERATE 35: CPT | Performed by: INTERNAL MEDICINE

## 2020-09-08 PROCEDURE — 74011250636 HC RX REV CODE- 250/636: Performed by: FAMILY MEDICINE

## 2020-09-08 PROCEDURE — 74011250636 HC RX REV CODE- 250/636: Performed by: INTERNAL MEDICINE

## 2020-09-08 PROCEDURE — 80053 COMPREHEN METABOLIC PANEL: CPT

## 2020-09-08 PROCEDURE — 65270000029 HC RM PRIVATE

## 2020-09-08 PROCEDURE — 77010033678 HC OXYGEN DAILY

## 2020-09-08 RX ORDER — PANTOPRAZOLE SODIUM 40 MG/1
40 TABLET, DELAYED RELEASE ORAL
Status: DISCONTINUED | OUTPATIENT
Start: 2020-09-08 | End: 2020-09-11 | Stop reason: HOSPADM

## 2020-09-08 RX ADMIN — Medication 10 ML: at 07:52

## 2020-09-08 RX ADMIN — HYDROMORPHONE HYDROCHLORIDE 4 MG: 2 TABLET ORAL at 21:31

## 2020-09-08 RX ADMIN — PANCRELIPASE 6 CAPSULE: 60000; 12000; 38000 CAPSULE, DELAYED RELEASE PELLETS ORAL at 17:28

## 2020-09-08 RX ADMIN — HYDROMORPHONE HYDROCHLORIDE 2 MG: 2 TABLET ORAL at 14:11

## 2020-09-08 RX ADMIN — HEPARIN SODIUM 5000 UNITS: 5000 INJECTION INTRAVENOUS; SUBCUTANEOUS at 07:52

## 2020-09-08 RX ADMIN — FINASTERIDE 5 MG: 5 TABLET, FILM COATED ORAL at 11:29

## 2020-09-08 RX ADMIN — PANCRELIPASE 6 CAPSULE: 60000; 12000; 38000 CAPSULE, DELAYED RELEASE PELLETS ORAL at 07:52

## 2020-09-08 RX ADMIN — DUTASTERIDE 0.5 MG: 0.5 CAPSULE, LIQUID FILLED ORAL at 11:29

## 2020-09-08 RX ADMIN — PANTOPRAZOLE SODIUM 40 MG: 40 TABLET, DELAYED RELEASE ORAL at 14:05

## 2020-09-08 RX ADMIN — ONDANSETRON 4 MG: 2 INJECTION INTRAMUSCULAR; INTRAVENOUS at 07:52

## 2020-09-08 RX ADMIN — HYDROMORPHONE HYDROCHLORIDE 4 MG: 2 TABLET ORAL at 17:29

## 2020-09-08 RX ADMIN — TAMSULOSIN HYDROCHLORIDE 0.4 MG: 0.4 CAPSULE ORAL at 08:42

## 2020-09-08 RX ADMIN — HYDROMORPHONE HYDROCHLORIDE 2 MG: 1 INJECTION, SOLUTION INTRAMUSCULAR; INTRAVENOUS; SUBCUTANEOUS at 07:52

## 2020-09-08 RX ADMIN — Medication 10 ML: at 21:38

## 2020-09-08 RX ADMIN — HEPARIN SODIUM 5000 UNITS: 5000 INJECTION INTRAVENOUS; SUBCUTANEOUS at 17:28

## 2020-09-08 RX ADMIN — SODIUM CHLORIDE 5 MG: 9 INJECTION INTRAMUSCULAR; INTRAVENOUS; SUBCUTANEOUS at 03:42

## 2020-09-08 RX ADMIN — PANCRELIPASE 6 CAPSULE: 60000; 12000; 38000 CAPSULE, DELAYED RELEASE PELLETS ORAL at 12:40

## 2020-09-08 RX ADMIN — POTASSIUM CHLORIDE 20 MEQ: 750 TABLET, FILM COATED, EXTENDED RELEASE ORAL at 08:42

## 2020-09-08 RX ADMIN — HYDROMORPHONE HYDROCHLORIDE 2 MG: 1 INJECTION, SOLUTION INTRAMUSCULAR; INTRAVENOUS; SUBCUTANEOUS at 03:42

## 2020-09-08 NOTE — PROGRESS NOTES
[address] [facility] Adult  Hospitalist Group                                                                                          Hospitalist Progress Note  Mira Villalpando MD  Answering service: [phone] from in house phone        Date of Service:  2020  NAME:  Gretel Mckeon. :  1955  MRN:  [id number]      Admission Summary:     Patient reports that he started experiencing abdominal pain associated with nausea, vomiting that started about 6 days back. Patient reports that for the past few days the pain has been getting worse, he has been having increased nausea, multiple bouts of vomiting, poor appetite, not able to tolerate p.o. Patient reports that he thinks he has another bout of pancreatitis. Patient reports that he has been taking oxycodone, tramadol, fentanyl patch and antiemetics at home but they have not been working. Patient reports that today he got concerned because he was not able to tolerate p.o., his epigastric pain persisted and he decided to come to the hospital patient was found to have mildly elevated bilirubin and was requested to be admitted under the hospitalist service. Interval history / Subjective:       Patient still with abdominal pain in epigastric area that radiates to the back. Lower abdominal pain is improving.      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  -Following oncology as outpatient, currently with radiation therapy    BPH  -Continue home medications    Chronic diarrhea  -Imodium as needed    Code status: FULL  DVT prophylaxis: Heparin    Care Plan discussed with: Patient/Family  Anticipated Disposition: Home w/Family  Anticipated Discharge: Greater than 48 hours     Hospital Problems  Date Reviewed: 7/6/2020          Codes Class Noted POA    Nausea & vomiting ICD-10-CM: R11.2  ICD-9-CM: 787.01  9/2/2020 Unknown        Abdominal pain ICD-10-CM: R10.9  ICD-9-CM: 789.00  12/31/2019 Unknown                Review of Systems:   A comprehensive review of systems was negative except for that written in the HPI. Vital Signs:    Last 24hrs VS reviewed since prior progress note. Most recent are:  Visit Vitals  /66 (BP 1 Location: Left arm, BP Patient Position: Sitting)   Pulse 78   Temp 97.9 °F (36.6 °C)   Resp 18   Ht 5' 8\" (1.727 m)   Wt 68.4 kg (150 lb 11.2 oz)   SpO2 97%   BMI 22.91 kg/m²         Intake/Output Summary (Last 24 hours) at 9/8/2020 1311  Last data filed at 9/8/2020 0905  Gross per 24 hour   Intake 480 ml   Output    Net 480 ml        Physical Examination:             Constitutional:  No acute distress, cooperative, pleasant    ENT:  Oral mucosa moist, oropharynx benign. Resp:  CTA bilaterally. No wheezing/rhonchi/rales. No accessory muscle use   CV:  Regular rhythm, normal rate, no murmurs, gallops, rubs    GI:  Soft, non distended, non tender. normoactive bowel sounds, no hepatosplenomegaly     Musculoskeletal:  No edema, warm, 2+ pulses throughout    Neurologic:  Moves all extremities.   AAOx3, CN II-XII reviewed     Skin:  Good turgor, no rashes or ulcers       Data Review:    Review and/or order of clinical lab test      Labs:     Recent Labs     09/07/20  0330 09/06/20  0441   WBC 3.6* 2.7*   HGB 12.0* 12.4   HCT 36.4* 37.2   PLT 109* 88*     Recent Labs     09/08/20  0400 09/07/20  0330 09/06/20 0441    138 138   K 3.7 3.4* 3.2*    105 104   CO2 27 28 28   BUN 5* 5* 5*   CREA 0.66* 0.56* 0.56*   * 98 98   CA 8.7 8.4* 8.5     Recent Labs     09/08/20  0400 09/07/20  0330 09/06/20 0441   ALT 68 62 46   * 231* 192*   TBILI 1.3* 1.0 1.3*   TP 5.7* 5.2* 5.1*   ALB 2.8* 2.5* 2.5*   GLOB 2.9 2.7 2.6     No results for input(s): INR, PTP, APTT, INREXT, INREXT in the last 72 hours. No results for input(s): FE, TIBC, PSAT, FERR in the last 72 hours. No results found for: FOL, RBCF   No results for input(s): PH, PCO2, PO2 in the last 72 hours. No results for input(s): CPK, CKNDX, TROIQ in the last 72 hours.     No lab exists for component: CPKMB  Lab Results   Component Value Date/Time    Cholesterol, total 81 09/02/2020 07:54 AM    HDL Cholesterol 49 09/02/2020 07:54 AM    LDL, calculated 22.6 09/02/2020 07:54 AM    Triglyceride 47 09/02/2020 07:54 AM    CHOL/HDL Ratio 1.7 09/02/2020 07:54 AM     Lab Results   Component Value Date/Time    Glucose (POC) 120 (H) 01/02/2020 11:47 AM    Glucose (POC) 124 (H) 01/02/2020 06:48 AM    Glucose (POC) 109 (H) 01/01/2020 10:04 PM    Glucose (POC) 204 (H) 01/01/2020 05:09 PM    Glucose (POC) 129 (H) 01/01/2020 11:52 AM     Lab Results   Component Value Date/Time    Color DARK YELLOW 09/03/2020 07:05 AM    Appearance CLEAR 09/03/2020 07:05 AM    Specific gravity 1.025 09/03/2020 07:05 AM    Specific gravity 1.027 08/16/2018 08:56 AM    pH (UA) 6.0 09/03/2020 07:05 AM    Protein TRACE (A) 09/03/2020 07:05 AM    Glucose Negative 09/03/2020 07:05 AM    Ketone 40 (A) 09/03/2020 07:05 AM    Bilirubin NEGATIVE  12/31/2019 08:12 AM    Urobilinogen 4.0 (H) 09/03/2020 07:05 AM    Nitrites Negative 09/03/2020 07:05 AM    Leukocyte Esterase TRACE (A) 09/03/2020 07:05 AM    Epithelial cells FEW 09/03/2020 07:05 AM    Bacteria Negative 09/03/2020 07:05 AM    WBC 0-4 09/03/2020 07:05 AM    RBC 20-50 09/03/2020 07:05 AM         Medications Reviewed:     Current Facility-Administered Medications   Medication Dose Route Frequency    acetaminophen (TYLENOL) tablet 650 mg  650 mg Oral Q4H PRN    prochlorperazine (COMPAZINE) with saline injection 5 mg  5 mg IntraVENous Q4H PRN    loperamide (IMODIUM) capsule 4 mg  4 mg Oral Q4H PRN    fentaNYL (DURAGESIC) 12 mcg/hr patch 1 Patch  1 Patch TransDERmal Q72H    fentaNYL (DURAGESIC) 25 mcg/hr patch 1 Patch  1 Patch TransDERmal Q72H    HYDROmorphone (DILAUDID) tablet 2-4 mg  2-4 mg Oral Q4H PRN    senna-docusate (PERICOLACE) 8.6-50 mg per tablet 2 Tab  2 Tab Oral DAILY    polyethylene glycol (MIRALAX) packet 17 g  17 g Oral DAILY    heparin (porcine) injection 5,000 Units  5,000 Units SubCUTAneous Q12H    HYDROmorphone (PF) (DILAUDID) injection 2 mg  2 mg IntraVENous Q3H PRN    sodium chloride (NS) flush 5-40 mL  5-40 mL IntraVENous Q8H    sodium chloride (NS) flush 5-40 mL  5-40 mL IntraVENous PRN    ondansetron (ZOFRAN) injection 4 mg  4 mg IntraVENous Q4H PRN    pantoprazole (PROTONIX) 40 mg in 0.9% sodium chloride 10 mL injection  40 mg IntraVENous Q24H    dutasteride (AVODART) capsule 0.5 mg  0.5 mg Oral DAILY    finasteride (PROSCAR) tablet 5 mg  5 mg Oral DAILY    lipase-protease-amylase (CREON 12,000) capsule 6 Cap  6 Cap Oral TID WITH MEALS    tamsulosin (FLOMAX) capsule 0.4 mg  0.4 mg Oral DAILY     ______________________________________________________________________  EXPECTED LENGTH OF STAY: 9d 19h  ACTUAL LENGTH OF STAY:          4                 Hillary Nunn MD

## 2020-09-08 NOTE — PROGRESS NOTES
Bedside shift change report given to Nena Birch (oncoming nurse) by Dinora Blanco RN (offgoing nurse). Report included the following information SBAR, Kardex and MAR.

## 2020-09-08 NOTE — PROGRESS NOTES
Patient: Harley Guaman. MRN: [id number]  SSN: [id number]    YOB: 1955  Age: 62 y.o. Sex: male        ADMITTED: 2020 to Sherry Moreau MD by Zachery Foy MD for Nausea & vomiting [R11.2]  Abdominal pain [R10.9]  POD# * No surgery date entered * Procedure(s):  LAPAROSCOPIC REVISION OF GASTROJEJUNOSTOMY TO LOLI-EN Y, POSSIBLE OPEN (URGENT)  Mild left hydronephrosis  Left renal vein occlusion  Moderate consequent, left retroperitoneal edema    Repeat imaging, ultrasound done , showing resolved left hydronephrosis  Afebrile/VSS. WBC 3.6   Cr 0.66      Vitals: Temp (24hrs), Av.7 °F (37.1 °C), Min:97.9 °F (36.6 °C), Max:100 °F (37.8 °C)    Blood pressure 112/66, pulse 78, temperature 97.9 °F (36.6 °C), resp. rate 18, height 5' 8\" (1.727 m), weight 68.4 kg (150 lb 11.2 oz), SpO2 97 %. Intake and Output:  No intake/output data recorded.  07 -  1900  In: 720 [P.O.:720]  Out: -   CHRIS Output lats 24 hrs: No data found. CHRIS Output last 8 hrs: No data found. BM over last 24 hrs: No data found.     Exam:  Appearance: Well-developed no acute distress  Conjunctiva: Conjunctiva and lids normal  External ears/nose: Normal no lesions or deformities  Respiratory effort: Breathing easily  Abdomen/flank: Soft, nontender, without masses, no CVA tenderness  : No Vega, no flank/cva pain  Digits/nails: No clubbing cyanosis or petechiae  Skin inspection: Warm and dry  Mood/Affect: normal    Labs:  CBC:   Lab Results   Component Value Date/Time    WBC 3.6 (L) 2020 03:30 AM    HCT 36.4 (L) 2020 03:30 AM    PLATELET 652 (L)  03:30 AM     BMP:   Lab Results   Component Value Date/Time    Glucose 114 (H) 2020 04:00 AM    Sodium 136 2020 04:00 AM    Potassium 3.7 2020 04:00 AM    Chloride 103 2020 04:00 AM    CO2 27 2020 04:00 AM    BUN 5 (L) 2020 04:00 AM    Creatinine 0.66 (L) 2020 04:00 AM    Calcium 8.7 2020 04:00 AM Imaging: Renal US  9/6/2020  IMPRESSION:   Normal renal cortical echogenicity. No hydronephrosis. Left renal cyst.       Assessment/Plan:     Mild left hydronephrosis, resolved  Left renal vein occlusion  Moderate consequent, left retroperitoneal edema  Abdominal pain, Gen. Surg. Planning for George-en-Y 9/9/2020.    -No  procedure required @ this time. Repeat imaging, US, showing resolved left hydronephrosis.  -Labs and VSS stable. Continue to monitor.  -Urology signing off, please re consult if services are needed.     Signed By: Apoorva Gomes NP - September 8, 2020

## 2020-09-08 NOTE — ADT AUTH CERT NOTES
Vomiting - Care Day (9/5/2020) by Juan Thompson RN  
 
   
Review Status  Review Entered Completed  9/7/2020 14:49   
   
Criteria Review Care Day: 4 Care Date: 9/5/2020 Level of Care: Inpatient Floor Guideline Day 2 Level Of Care (X) Floor to discharge [B]   
9/7/2020 14:49:38 EDT by Ernst Fall [facility] Clinical Status ( ) * Hemodynamic stability ( ) * Vomiting absent or controlled ( ) * Electrolyte abnormalities absent or acceptable for next level of care ( ) * Life-threatening causes of vomiting excluded ( ) * Pain absent or managed ( ) * Discharge plans and education understood Activity ( ) * Ambulatory or acceptable for next level of care Routes ( ) * Oral hydration ( ) * Liquid or advanced diet Interventions   
(X) Electrolytes 9/7/2020 14:49:38 EDT by Kelsea Woo   
  , K 3.4, Medications (X) Possible antiemetics 9/7/2020 14:49:38 EDT by Kelsea Woo   
  IV Zofran * Milestone Additional Notes Abdominal pain   
-Acute onset abdominal pain, CT without acute pathology   
-History of chronic pancreatitis secondary to pancreatic duct stricture, prior stent in the duct was removed in July 2020   
-GI following, for EUS guided plexus block on 9/9/2020   
-General surgery evaluating the patient, plan for revision of George-en-Y 9/9/2020   
    
Abnormal LFTs   
-Elevated alk phos and T bili, normal transaminases -MRCP shows no issues with biliary ducts   
    
Hydronephrosis -Left hydronephrosis, but no other obstruction noted on CT   
-MRCP showing left renal vein occlusion with retroperitoneal edema   
-Urology following   
-Plan for ultrasound in the a.m. for reevaluation, possible stent placement if hydro-still persists   
    
Cholangiocarcinoma   
-History of cholangiocarcinoma with now recurrence extrahepatic involvement   
-Following oncology as outpatient, currently with radiation therapy   
    
 BPH   
-Continue home medications   
    
Code status: FULL   
DVT prophylaxis: Heparin Visit Vitals BP 97/62 (BP 1 Location: Right arm, BP Patient Position: At rest) Pulse 64 Temp 97.9 °F (36.6 °C) Resp 18 Ht 5' 8\" (1.727 m) Wt 68.4 kg (150 lb 11.2 oz) SpO2 96% BMI 22.91 kg/m² Results for Veto Galvez (MRN [id number]) as of 9/7/2020 14:45   
  
9/5/2020 01:12 Sodium: 134 (L) Potassium: 3.4 (L) Chloride: 101 CO2: 26 Anion gap: 7 Glucose: 103 (H) BUN: 10 Creatinine: 0.59 (L) BUN/Creatinine ratio: 17 Calcium: 8.1 (L)   
GFR est non-AA: >60   
GFR est AA: >60 Bilirubin, total: 1.8 (H) Protein, total: 5.1 (L) Albumin: 2.5 (L) Globulin: 2.6 A-G Ratio: 1.0 (L) ALT: 30 AST: 16 Alk. phosphatase: 144 (H) Results for Veto Galvez (MRN [id number]) as of 9/7/2020 14:45   
  
9/5/2020 01:12 WBC: 3.2 (L) NRBC: 0.0   
RBC: 3.75 (L) HGB: 11.8 (L) HCT: 35.0 (L) Constitutional: No acute distress, cooperative, pleasant    
ENT: Oral mucosa moist, oropharynx benign. Resp: CTA bilaterally. No wheezing/rhonchi/rales. No accessory muscle use CV: Regular rhythm, normal rate, no murmurs, gallops, rubs   
 GI: Soft, non distended, non tender. normoactive bowel sounds, no hepatosplenomegaly   
 Musculoskeletal: No edema, warm, 2+ pulses throughout   
 Neurologic: Moves all extremities.  AAOx3, CN II-XII reviewed   
                      Skin:  Good turgor, no rashes or ulcers   
    
Medications,   
  
finasteride (PROSCAR) tablet 5 mg     
Dose: 5 mg Freq: DAILY Route: PO   
Start: 09/03/20 0900   
  
heparin (porcine) injection 5,000 Units     
Dose: 5,000 Units Freq: EVERY 12 HOURS Route: SC   
Start: 09/05/20 0600 HYDROmorphone (PF) (DILAUDID) injection 2 mg     
Dose: 2 mg Freq: EVERY 3 HOURS AS NEEDED Route: IV x8 PRN Reason: Severe Pain lipase-protease-amylase (CREON 12,000) capsule 6 Cap     
Dose: 6 Cap Freq: 3 TIMES DAILY WITH MEALS Route: PO   
Start: 20 0800   
  
ondansetron (ZOFRAN) injection 4 mg     
Dose: 4 mg Freq: EVERY 4 HOURS AS NEEDED Route: IV x5 PRN Reason: Nausea or Vomiting   
  
pantoprazole (PROTONIX) 40 mg in 0.9% sodium chloride 10 mL injection     
Dose: 40 mg   
Freq: EVERY 24 HOURS Route: IV Start: 20 1418   
  
prochlorperazine (COMPAZINE) with saline injection 5 mg     
Dose: 5 mg Freq: EVERY 4 HOURS AS NEEDED Route: IV x3 PRN Reason: Nausea or Vomiting Start: 20 0834   
   
Letter of Determination by Dayna Lobato RN  
 
   
Review Status  Review Entered In Primary  2020 15:09   
   
Criteria Review We recommend that the following pt's hospitalization under OBSERVATION [104] 
status   is upgraded to INPATIENT If you agree, please place a new ADMIT order 
in [facility]  as recommended. 
  
  
Name:          Bob Honeycutt. :            1955 [facility]# :         [id number] Insurance:   [address] [place] [address]  
  
Clinical summary        patient with abdominal pain Vitals                           soft blood pressure Labs and Imaging       persistent elevation in bilirubin MCG criteria applies   NO Comments       patient with abdominal pain, history of Whipple's secondary to 
pancreatic cancer, MRCP pending, unclear etiology for abdominal pain but 
continues to have abdominal pain, on clear liquid diet, on IV fluids, requiring 
pain medication, needing medical optimization 
  
  
This chart was reviewed at 6:49 AM 2020  
   
Vomiting - Care Day  (2020) by Dayna Lobato RN  
 
   
Review Status  Review Entered Completed  2020 15:03   
   
Criteria Review Care Day: 3 Care Date: 2020 Level of Care: Inpatient Floor Guideline Day 2 Level Of Care (X) Floor to discharge [B]   
2020 15:03:48 EDT by Nany Gaming   Floor Clinical Status ( ) * Hemodynamic stability ( ) * Vomiting absent or controlled ( ) * Electrolyte abnormalities absent or acceptable for next level of care ( ) * Life-threatening causes of vomiting excluded ( ) * Pain absent or managed ( ) * Discharge plans and education understood Activity ( ) * Ambulatory or acceptable for next level of care Routes ( ) * Oral hydration   
(X) * Liquid or advanced diet 9/4/2020 15:03:48 EDT by Richi Mackay   
  Diet Clear liquid Interventions   
(X) Electrolytes 9/4/2020 15:03:48 EDT by Gill Cheese Results for Carmen Lambert (MRN [id number]) as of 9/4/2020 14:59 
 
9/4/2020 03:53 Sodium: 134 (L) Potassium: 3.7 Chloride: 101 * Milestone Additional Notes   
     
Patient still with abdominal pain in the lower abdomen, epigastric area that radiates to the back.   
    
Assessment & Plan:   
    
Abdominal pain   
-Acute onset abdominal pain, CT without acute pathology   
-History of chronic pancreatitis secondary to pancreatic duct stricture, prior stent in the duct was removed in July 2020   
-GI following, for EUS guided plexus block on 9/9/2020   
-General surgery evaluating the patient, plan for revision of George-en-Y next week   
    
Abnormal LFTs   
-Elevated alk phos and T bili, normal transaminases -MRCP shows no issues with biliary ducts   
    
Hydronephrosis -Left hydronephrosis, but no other obstruction noted on CT   
-MRCP showing left renal vein occlusion with retroperitoneal edema   
-Urology following   
    
Cholangiocarcinoma   
-History of cholangiocarcinoma with now recurrence extrahepatic involvement   
-Following oncology as outpatient, currently with radiation therapy   
    
BPH   
-Continue home medications   
    
Code status: FULL   
DVT prophylaxis: Heparin   
 Results for Carmen Lambert (MRN [id number]) as of 9/4/2020 14:59   
  
9/4/2020 03:53 WBC: 5.1 NRBC: 0.0   
RBC: 3.70 (L) HGB: 11.8 (L) HCT: 35.1 (L) Results for Georgia Gilliam (MRN [id number]) as of 9/4/2020 14:59   
  
9/4/2020 03:53 Sodium: 134 (L) Potassium: 3.7 Chloride: 101 CO2: 27 Anion gap: 6 Glucose: 103 (H) BUN: 15   
Creatinine: 0.68 (L) BUN/Creatinine ratio: 22 (H) Calcium: 8.2 (L)   
GFR est non-AA: >60   
GFR est AA: >60 Bilirubin, total: 2.9 (H) Protein, total: 4.8 (L) Albumin: 2.4 (L) Globulin: 2.4 A-G Ratio: 1.0 (L) ALT: 31 AST: 12 (L) Alk. phosphatase: 138 (H)   
  
9/4/2020 13:10 Bilirubin, total: 2.7 (H) Bilirubin, direct: 0.8 (H) Bilirubin, indirect: 1.9 (H) General Surgery Consult-Assessment   
    
Gricelda Ferguson. is a 62 y. 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 Wes Lee now presents with acutely worsened abdominal pain and ongoing bilious emesis.     
    
Plan   
    
- MRI shows left renal vein occlusion, left hydronephrosis and perirenal edema throughout the retroperitoneum.  I think this may be the result of acute left renal vein occlusion and developing collaterals that is likely related to either his tumor recurrence or radiation.  I would recommend getting input from Urology as to whether there is any recommended intervention in this case that may be helpful for his pain.     
- Dr. Clemente Ortiz for celiac plexus block next week to assist with pain control from tumor recurrence in that area.    
- I discussed performing a conversion of his duodenojejunostomy to a eren-en Y reconstruction next week to help his longstanding emesis which is from bile reflux gastritis.  Hopefully this can be set up for Wednesday.     
- Etiology of his elevated bilirubin unclear.  I have ordered a fractionated bilirubin to see if this is obstructive or not.  This may be Gilbert's or a hemolytic source if predominantly unconjugated as no signs of biliary anastomotic stricture or ductal dilation is noted on MRCP.     
- No pancreatic ductal dilation noted on MRCP, so I would recommend leaving this anastomosis alone currently.     
  
Palliative Consult-SUMMARY:   
Aamir Mahoney is a 62y.o. year old with a  history of extrahepatic cholangiocarcinoma status post pancreaticoduodenectomy in 2017 followed by chemotherapy, disease-free for 2.5 years, recent recurrence of disease in para-aortic soft tissue status post RT, history of A. 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  His chronic back pain has been controlled with fentanyl patch 37.5 mcg every 72 hours. 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 Jenniffer Alvares is second marriage for both of them.   
    
He is now admitted with intractable bilateral flank pain, biliary emesis and nausea.  MRI shows renal vein thrombosis   
    
Current hospitalization-plan for celiac plexus block.  Reversal of Whipple procedure planned for next Wednesday-9/9/2020   
    
    
 PALLIATIVE DIAGNOSES:   
1. Bilateral flank pain 2. chronic low back pain- on fentanyl 37.5 mcg patch plus tramadol 3 tablets/day 3.  Chronic nausea and vomiting   
    
    
 PLAN:   
1. New bilateral flank pain-  MRI shows renal vein thrombosis.  He is currently on IV Dilaudid with moderate relief.  Received 3 doses of IV Dilaudid 2 mg.   
            - Given patient is planned for reversal of Whipple next Wednesday, restart p.o. Dilaudid to his not only dependent on IV opioids for pain medication.   
            -Start Dilaudid p.o. 2 to 4 mg every 4 hours as needed.             -Chronic back pain-patient has been on fentanyl 37.5 mcg patch every 72 hours with good relief.  His patch was removed on Tuesday.  This needs to be restarted in order to help with his back pain and preferably decrease his need for IV pain medication. Elysia Nieves that we do not have 37.5 mcg patch in our formulary, I am placing an order for 25 mcg +12 mcg patch.   
            -He is getting a celiac plexus block   
    
2. Constipation-he tends to struggle with diarrhea most of the time and then severe constipation.  Important to prevent constipation with increasing opioids and flank pain.  Please start Zeny-Colace 2 tablets daily and MiraLAX as needed.  Okay to hold if patient is having loose stools or diarrhea. 3. Intractable nausea- On IV Zofran with good relief.   
    
4. Patient will remain in hospital for the next several days, plan for reversal of Whipple next Wednesday.  Our team will follow to help with pain control.  Please call palliative medicine at [phone] should patient have uncontrolled pain.   
    
5. iInitial consult note routed to primary continuity provider and/or primary health care team members 6. Communicated plan of care with: Palliative IDT, [facility] Team   
  
GI Consult-   
Assessment/Plan Abdominal pain, N/V:   
Chronic pancreatic duct stricture previously treated with stenting - Stent removed 7/2020, but unable to replace - ALP and TBili improving - Lipase normal - Previous triglyceride and IgG4 normal   
- Plan for EUS-guided celiac plexus block with Dr. Kerrie Lizama on 9/9/20   
- Dr. Janie Moscoso of previous Whipple next week as well   
- Pain management and anti-emetics prn   
    
Extrahepatic cholangiocarcinoma: diagnosed 2017, treated with pylorus-preserving Whipple and chemo - T3 N1 (1 of 12 LN +ve); Invasion into pancreas; R0 resection   
- CT guided biopsy 2/3/2020 of paraaortic soft tissue mass - positive for adenocarcinoma - PET scan 6/2020 showed increased size - Follows with Dr. Lottie Garza and has been seen by Palliative OP   
    
Medications,   
dutasteride (AVODART) capsule 0.5 mg     
Dose: 0.5 mg   
Freq: DAILY Route: PO   
Start: 09/03/20 0900   
 Admin Instructions:   
  
fentaNYL (DURAGESIC) 12 mcg/hr patch 1 Patch     
Dose: 1 Patch Freq: EVERY 72 HOURS Route: TD Start: 09/04/20 1400 HYDROmorphone (DILAUDID) tablet 2 mg PO x1 Dose: 2-4 mg Freq: EVERY 4 HOURS AS NEEDED Route: PO   
PRN Reason: Severe Pain HYDROmorphone (PF) (DILAUDID) injection 2 mg     
Dose: 2 mg Freq: EVERY 3 HOURS AS NEEDED Route: IV x4 PRN Reason: Severe Pain Start: 09/03/20 1720   
  
ondansetron (ZOFRAN) injection 4 mg     
Dose: 4 mg Freq: EVERY 4 HOURS AS NEEDED Route: IV x3 PRN Reason: Nausea or Vomiting Start: 09/02/20 1506   
  
  
   
Vomiting - Care Day (9/2/2020) by Olaf Marcus RN  
 
   
Review Status  Review Entered Completed  9/3/2020 07:53   
   
Criteria Review Care Day: 1 Care Date: 9/2/2020 Level of Care: Inpatient Floor Guideline Day 1 Level Of Care (X) Floor 9/3/2020 07:53:30 EDT by Carlos Benedict Floor Clinical Status ( ) * Clinical Indications met [A]   
9/3/2020 07:53:30 EDT by Chirag Dodge   
  61 y/o admitted with vomiting Routes (X) IV fluids, medications 9/3/2020 07:53:30 EDT by Chirag Dodge   
  LR IVF Interventions (X) CBC, chemistries 9/3/2020 07:53:30 EDT by Chiragnicholas Dodge   
  hgb 14.6, HCT 43.4 * Milestone Additional Notes Patient came through the ED-Abdominal Pain     
This is a recurrent problem. The current episode started more than 2 days ago. The problem occurs constantly. The problem has been gradually worsening. The pain is associated with vomiting and previous surgery. The pain is located in the epigastric region.  The pain is at a severity of 10/10. The pain is severe. Associated symptoms include anorexia, nausea and vomiting. Pertinent negatives include no fever, no belching, no diarrhea, no flatus, no hematochezia, no melena, no constipation, no dysuria, no frequency, no hematuria, no headaches, no arthralgias, no myalgias, no trauma, no chest pain and no back pain. Nothing worsens the pain. The pain is relieved by nothing. Past workup includes CT scan, ultrasound, esophagogastroduodenoscopy. His past medical history is significant for cancer and DM. Results for Inez Peace (MRN [id number]) as of 9/3/2020 07:52   
  
9/2/2020 07:54 WBC: 9.1 NRBC: 0.0   
RBC: 4.57 HGB: 14.6 HCT: 43.4 Results for Inez Peace (MRN [id number]) as of 9/3/2020 07:52   
  
9/2/2020 07:54 Sodium: 137 Potassium: 3.9 Chloride: 104 CO2: 27 Anion gap: 6 Glucose: 106 (H) BUN: 19 Creatinine: 0.82   
BUN/Creatinine ratio: 23 (H) CT abdomen pelvis-IMPRESSION IMPRESSION:   
Mild left hydronephrosis and delayed nephrogram of uncertain etiology as a   
ureteral stone is not visualized. Status post Whipple. Paraceliac soft tissue   
abnormality is unchanged compared to the prior exam. Inflammatory changes in the   
left anterior pararenal space unchanged. Gastrointestinal: Positive for abdominal pain, anorexia, nausea and vomiting Physical Exam   
Vitals signs and nursing note reviewed. Constitutional:     
   General: He is not in acute distress.   
   Appearance: He is well-developed. He is not diaphoretic. HENT:   
   Head: Normocephalic and atraumatic. Eyes:   
   Pupils: Pupils are equal, round, and reactive to light. Neck:   
   Musculoskeletal: Normal range of motion and neck supple. Cardiovascular:   
   Rate and Rhythm: Normal rate and regular rhythm.   
   Heart sounds: Normal heart sounds. No murmur. No friction rub. No gallop.     
Pulmonary:    Effort: Pulmonary effort is normal. No respiratory distress.   
   Breath sounds: Normal breath sounds. No wheezing. Abdominal:   
   General: Bowel sounds are normal. There is no distension.   
   Palpations: Abdomen is soft.   
   Tenderness: There is generalized abdominal tenderness. There is no guarding or rebound. Musculoskeletal: Normal range of motion. Skin:   
   General: Skin is warm.   
   Findings: No rash. Neurological:   
   Mental Status: He is alert and oriented to person, place, and time. Psychiatric:       
   Behavior: Behavior normal.       
   Thought Content: Thought content normal.       
   Judgment: Judgment normal.   
  
Medications,   
heparin (porcine) injection 5,000 Units     
Dose: 5,000 Units Freq: EVERY 8 HOURS Route: SC   
  
HYDROmorphone (PF) (DILAUDID) injection 1 mg     
Dose: 1 mg Freq: EVERY 4 HOURS AS NEEDED Route: IV x2 PRN Reason: Severe Pain Start: 09/02/20 1540   
  
lactated Ringers infusion     
Rate: 100 mL/hr Dose: 100 mL/hr Freq: CONTINUOUS Route: IV Start: 09/02/20 1541   
  
fentaNYL citrate (PF) injection 25 mcg     
Dose: 25 mcg Freq: NOW Route: IV x1   
Start: 09/02/20 0810 End: 09/02/20 3328 promethazine (PHENERGAN) 25 mg in NS IVPB     
Dose: 25 mg   
Freq: NOW Route: IV x1   
Start: 09/02/20 1057 End: 09/02/20 1156   
  
sodium chloride 0.9 % bolus infusion 1,000 mL     
Dose: 1,000 mL Freq: NOW Route: IV Start: 09/02/20 0810 End: 09/02/20 1028 Assessment:   
    
Abdominal pain with nausea and vomiting: Unclear etiology, possible acute on chronic pancreatitis, patient will be observed on a medical bed, IV hydration, pain control, patient with hyperbilirubinemia, right upper quadrant ultrasound, n.p.o., GI consult, supportive care and close monitoring, IV antiemetics, if symptoms persist may consider further intervention and diagnostics, reassess as needed   
    
 Dehydration: Patient appears to be dehydrated, gentle IV hydration, repeat labs in the morning, continue to monitor   
    
History of pancreatic cancer: Patient's oncologist is Dr. Tio Cheney, may see consult by patient in house, supportive care, pain control, close monitoring   
    
Left hydronephrosis: Unclear etiology, ? related to malignancy, obtain UA, urology consult, pain control, supportive care, close monitoring   
    
    
Diabetes mellitus type 2: Sliding scale level on insulin, Accu-Cheks, diet control, close monitoring, further intervention per hospital course   
    
Hypertension: Continue to monitor   
    
GI DVT prophylaxis: Patient on heparin   
    
  
  
   
Vomiting - Clinical Indications for Admission to Inpatient Care by Isabel Goodwin RN  
 
   
Review Status  Review Entered Completed  9/3/2020 07:51   
   
Criteria Review Clinical Indications for Admission to Inpatient Care Most Recent : Eddye Najjar Most Recent Date: 9/3/2020 07:51:51 EDT ( ) Admission is indicated for  1 or more  of the following  (1) (2) (3):   
   ( ) Severe pain requiring acute inpatient management   
   9/3/2020 07:51:51 EDT by Eddye Najjar   
     The pain is located in the epigastric region. The pain is at a severity of 10/10. The pain is severe

## 2020-09-08 NOTE — ROUTINE PROCESS
Bedside shift change report given to fam ha rn (oncoming nurse) by Sarah Lopez (offgoing nurse). Report included the following information SBAR and Kardex.

## 2020-09-08 NOTE — PROGRESS NOTES
[facility]      Subjective     No acute events overnight. Patient denies pain currently. Has been tolerating diet. Ready for surgery tomorrow. Objective     Patient Vitals for the past 24 hrs:   Temp Pulse Resp BP SpO2   09/08/20 1426 98.1 °F (36.7 °C) 82 18 115/70 99 %   09/08/20 0904 97.9 °F (36.6 °C) 78 18 112/66 97 %   09/08/20 0456     92 %   09/08/20 0300 98.7 °F (37.1 °C) 97 18 99/60 92 %   09/07/20 1951 98.3 °F (36.8 °C) 89 18 108/67 93 %   09/07/20 1502 98.6 °F (37 °C) 86 18 104/66 96 %       Date 09/07/20 0700 - 09/08/20 0659 09/08/20 0700 - 09/09/20 0659   Shift 8696-5235 5468-1713 24 Hour Total 2495-7948 4986-1405 24 Hour Total   INTAKE   P.O.    720  720     P. O.    720  720   Shift Total(mL/kg)    720(10.5)  720(10.5)   OUTPUT   Urine(mL/kg/hr)           Urine Occurrence(s)    1 x  1 x   Shift Total(mL/kg)         NET    720  720   Weight (kg) 68.4 68.4 68.4 68.4 68.4 68.4       PE  GEN - Awake, alert, communicating appropriately. NAD  Pulm - CTAB  CV - RRR  Abd - soft, ND,  NT. Ext - warm, well perfused. Labs  Recent Results (from the past 24 hour(s))   METABOLIC PANEL, COMPREHENSIVE    Collection Time: 09/08/20  4:00 AM   Result Value Ref Range    Sodium 136 136 - 145 mmol/L    Potassium 3.7 3.5 - 5.1 mmol/L    Chloride 103 97 - 108 mmol/L    CO2 27 21 - 32 mmol/L    Anion gap 6 5 - 15 mmol/L    Glucose 114 (H) 65 - 100 mg/dL    BUN 5 (L) 6 - 20 MG/DL    Creatinine 0.66 (L) 0.70 - 1.30 MG/DL    BUN/Creatinine ratio 8 (L) 12 - 20      GFR est AA >60 >60 ml/min/1.73m2    GFR est non-AA >60 >60 ml/min/1.73m2    Calcium 8.7 8.5 - 10.1 MG/DL    Bilirubin, total 1.3 (H) 0.2 - 1.0 MG/DL    ALT (SGPT) 68 12 - 78 U/L    AST (SGOT) 30 15 - 37 U/L    Alk.  phosphatase 280 (H) 45 - 117 U/L    Protein, total 5.7 (L) 6.4 - 8.2 g/dL    Albumin 2.8 (L) 3.5 - 5.0 g/dL    Globulin 2.9 2.0 - 4.0 g/dL    A-G Ratio 1.0 (L) 1.1 - 2. 2       MRI Results (most recent):  Results from [place] encounter on 09/02/20   MRI ABD W MRCP W WO CONT    Narrative MRI ABDOMEN AND MRCP WITH AND WITHOUT CONTRAST. 9/3/2020 11:15 PM    INDICATION: Elevated bilirubin. History of Whipple for cholangiocarcinoma. Stricture of the pancreatic anastomosis. COMPARISON: CT abdomen pelvis 9/3/2020, 9/2/2020, 12/4/2019. TECHNIQUE: Multisequence and multiplanar MRI of the abdomen was performed after  the administration of 7 mL of Gadavist (gadobutrol)  IV contrast. Images were  obtained without contrast; and in late arterial, portal venous, and delayed  phases after the administration of contrast.     Heavily T2-weighted thick slab and thin slice images were obtained in the  oblique coronal plane through the biliary tree (MRCP). FINDINGS:   Recurrence: Abnormal soft tissue centered between the common hepatic artery and  the SMA has increased. On 12/4/2019, the SMA and common hepatic artery where  encased in the celiac artery was abutted. It measured approximately 16 x 44 x 24  mm in greatest dimensions Evaluation at that time was compounded by superimposed  acute pancreatitis, however. On today's examination and on 9/2/2020, it measured  34 x 47 x 58 mm. This spiculated, T1 hypointense mass shows slight enhancement on delayed phases  (13-67, 10-67). It now encases the superior mesenteric artery and narrows it  from the origin over greater than 2 cm. There is poststenotic dilation of the  midportion. The celiac and common hepatic arteries are encased but not narrowed. The bilateral renal arteries are encased and more mildly narrowed. The left  renal vein is obliterated, and the left kidney drains via collaterals to the  left gonadal splenic, and lumbar veins. The aorta is abutted over 180 degrees. This represents recurrent tumor until proven otherwise. Retroperitoneal fibrosis  is a less likely possibility. Abdomen:  There is heterogeneous perfusion throughout the liver on arterial  phase, with no correlate on portal venous phase, and delayed phase, or  T2-weighted images. This is more than typically seen as perfusion anomalies. It  is indeterminate, but of questionable significance. The portal and splenic veins remain dilated. No downstream narrowing. No overt  cirrhosis. No superior mesenteric vein dilation. Moderate left retroperitoneal edema is likely due to renal vein occlusion. A  trace left pleural effusion and trace left paracolic ascites are also  associated. No pancreatic or biliary duct dilation. Incidental note is made of left renal  cysts and lower thoracic nerve root sleeve cysts. The distal esophagus, stomach,  gastrojejunostomy, biliary limb, spleen, adrenals, and kidneys are otherwise  normal. Descending diverticulosis is mild. Post L5-S1 fusion. Impression IMPRESSION:   1. Perivascular recurrence in the superior retroperitoneum extends around the  celiac, common hepatic, superior mesenteric, and bilateral renal arteries. 2. Left renal vein occlusion. Moderate consequent, left retroperitoneal edema,  trace ascites, and trace left pleural effusion. 3. Narrowing of the origin and proximal SMA. Poststenotic dilation of the  midportion. 4. More mild narrowing of the bilateral renal arteries. CT Results (most recent):  Results from Hospital Encounter encounter on 09/02/20   CT ABD PELV WO CONT    Narrative EXAM: CT ABD PELV WO CONT    INDICATION: Evaluation for renal or ureteral stones    COMPARISON: CT from the prior day    CONTRAST:  None. TECHNIQUE:   Thin axial images were obtained through the abdomen and pelvis. Coronal and  sagittal reformats were generated. Oral contrast was not administered. CT dose  reduction was achieved through use of a standardized protocol tailored for this  examination and automatic exposure control for dose modulation.      The absence of intravenous contrast material reduces the sensitivity for  evaluation of the vasculature and solid organs. FINDINGS:   LOWER THORAX: No significant abnormality in the incidentally imaged lower chest.  LIVER: No mass. BILIARY TREE: Prior cholecystectomy. Mild pneumobilia. CBD is not dilated. SPLEEN: within normal limits. PANCREAS: No focal abnormality. ADRENALS: Unremarkable. KIDNEYS/URETERS: Nonobstructing 2 and 3 mm calculi. Left hydronephrosis and  hydroureter to the level of the pelvis. STOMACH: Unremarkable. SMALL BOWEL: No dilatation or wall thickening. COLON: No dilatation or wall thickening. Sigmoid diverticulosis. APPENDIX:  PERITONEUM: No ascites or pneumoperitoneum. RETROPERITONEUM: No lymphadenopathy or aortic aneurysm. REPRODUCTIVE ORGANS: Prostatomegaly with prostate calcifications  URINARY BLADDER: No mass or calculus. BONES: No destructive bone lesion. ABDOMINAL WALL: No mass or hernia. ADDITIONAL COMMENTS: N/A      Impression IMPRESSION:  Nonobstructing right renal calculus  Left hydronephrosis and hydroureter but no distal ureteral calculus  Incidental sigmoid diverticulosis  Mild prostatomegaly         Assessment     Scarlett Hall is a 62 y. 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 He now presents with acutely worsened abdominal pain and ongoing bilious emesis. Plan     - Plan for attempt at laparoscopic revision of his duodenojejunostomy to a eren-en Y reconstruction, possible open tomorrow.    - Dr. Jose Marcum for celiac plexus block tomorrow morning prior to surgery. - Etiology of his elevated bilirubin is unconjugated suggesting either Gilbert's syndrome or possibly a hemolytic source. No signs of biliary obstruction on imaging either.    - No pancreatic ductal dilation noted on MRCP, so I would recommend leaving this anastomosis alone currently.       Josie Doherty, MD  9/8/2020  5:30 PM

## 2020-09-08 NOTE — PROGRESS NOTES
Bedside shift change report given to Yulissa Ferrari (oncoming nurse) by Candance Clink RN (offgoing nurse). Report included the following information SBAR, Kardex and MAR.

## 2020-09-08 NOTE — PROGRESS NOTES
Problem: Falls - Risk of  Goal: *Absence of Falls  Description: Document Rachel Vicente Fall Risk and appropriate interventions in the flowsheet.   Outcome: Progressing Towards Goal  Note: Fall Risk Interventions:            Medication Interventions: Patient to call before getting OOB, Teach patient to arise slowly                   Problem: Patient Education: Go to Patient Education Activity  Goal: Patient/Family Education  Outcome: Progressing Towards Goal     Problem: Discharge Planning  Goal: *Discharge to safe environment  Outcome: Progressing Towards Goal     Problem: Pain  Goal: *Control of Pain  Outcome: Progressing Towards Goal  Goal: *PALLIATIVE CARE:  Alleviation of Pain  Outcome: Progressing Towards Goal     Problem: Diarrhea (Adult and Pediatrics)  Goal: *Absence of diarrhea  Outcome: Progressing Towards Goal     Problem: Patient Education: Go to Patient Education Activity  Goal: Patient/Family Education  Outcome: Progressing Towards Goal     Problem: Nausea/Vomiting (Adult)  Goal: *Absence of nausea/vomiting  Outcome: Progressing Towards Goal     Problem: Nutrition Deficit  Goal: *Optimize nutritional status  Outcome: Progressing Towards Goal

## 2020-09-08 NOTE — ADT AUTH CERT NOTES
Vomiting - Care Day (9/5/2020) by Yonathan Glass RN  
 
   
Review Status  Review Entered Completed  9/7/2020 14:49   
   
Criteria Review Care Day: 4 Care Date: 9/5/2020 Level of Care: Inpatient Floor Guideline Day 2 Level Of Care (X) Floor to discharge [B]   
9/7/2020 14:49:38 EDT by Kenroy Linares Floor Clinical Status ( ) * Hemodynamic stability ( ) * Vomiting absent or controlled ( ) * Electrolyte abnormalities absent or acceptable for next level of care ( ) * Life-threatening causes of vomiting excluded ( ) * Pain absent or managed ( ) * Discharge plans and education understood Activity ( ) * Ambulatory or acceptable for next level of care Routes ( ) * Oral hydration ( ) * Liquid or advanced diet Interventions   
(X) Electrolytes 9/7/2020 14:49:38 EDT by Henry Gaming   
  , K 3.4, Medications (X) Possible antiemetics 9/7/2020 14:49:38 EDT by Henry Gaming   
  IV Zofran * Milestone Additional Notes Abdominal pain   
-Acute onset abdominal pain, CT without acute pathology   
-History of chronic pancreatitis secondary to pancreatic duct stricture, prior stent in the duct was removed in July 2020   
-GI following, for EUS guided plexus block on 9/9/2020   
-General surgery evaluating the patient, plan for revision of George-en-Y 9/9/2020   
    
Abnormal LFTs   
-Elevated alk phos and T bili, normal transaminases -MRCP shows no issues with biliary ducts   
    
Hydronephrosis -Left hydronephrosis, but no other obstruction noted on CT   
-MRCP showing left renal vein occlusion with retroperitoneal edema   
-Urology following   
-Plan for ultrasound in the a.m. for reevaluation, possible stent placement if hydro-still persists   
    
Cholangiocarcinoma   
-History of cholangiocarcinoma with now recurrence extrahepatic involvement   
-Following oncology as outpatient, currently with radiation therapy   
    
 BPH   
-Continue home medications   
    
Code status: FULL   
DVT prophylaxis: Heparin Visit Vitals BP 97/62 (BP 1 Location: Right arm, BP Patient Position: At rest) Pulse 64 Temp 97.9 °F (36.6 °C) Resp 18 Ht 5' 8\" (1.727 m) Wt 68.4 kg (150 lb 11.2 oz) SpO2 96% BMI 22.91 kg/m² Results for Yasmine Lara (MRN [id number]) as of 9/7/2020 14:45   
  
9/5/2020 01:12 Sodium: 134 (L) Potassium: 3.4 (L) Chloride: 101 CO2: 26 Anion gap: 7 Glucose: 103 (H) BUN: 10 Creatinine: 0.59 (L) BUN/Creatinine ratio: 17 Calcium: 8.1 (L)   
GFR est non-AA: >60   
GFR est AA: >60 Bilirubin, total: 1.8 (H) Protein, total: 5.1 (L) Albumin: 2.5 (L) Globulin: 2.6 A-G Ratio: 1.0 (L) ALT: 30 AST: 16 Alk. phosphatase: 144 (H) Results for Yasmine Lara (MRN [id number]) as of 9/7/2020 14:45   
  
9/5/2020 01:12 WBC: 3.2 (L) NRBC: 0.0   
RBC: 3.75 (L) HGB: 11.8 (L) HCT: 35.0 (L) Constitutional: No acute distress, cooperative, pleasant    
ENT: Oral mucosa moist, oropharynx benign. Resp: CTA bilaterally. No wheezing/rhonchi/rales. No accessory muscle use CV: Regular rhythm, normal rate, no murmurs, gallops, rubs   
 GI: Soft, non distended, non tender. normoactive bowel sounds, no hepatosplenomegaly   
 Musculoskeletal: No edema, warm, 2+ pulses throughout   
 Neurologic: Moves all extremities.  AAOx3, CN II-XII reviewed   
                      Skin:  Good turgor, no rashes or ulcers   
    
Medications,   
  
finasteride (PROSCAR) tablet 5 mg     
Dose: 5 mg Freq: DAILY Route: PO   
Start: 09/03/20 0900   
  
heparin (porcine) injection 5,000 Units     
Dose: 5,000 Units Freq: EVERY 12 HOURS Route: SC   
Start: 09/05/20 0600 HYDROmorphone (PF) (DILAUDID) injection 2 mg     
Dose: 2 mg Freq: EVERY 3 HOURS AS NEEDED Route: IV x8 PRN Reason: Severe Pain lipase-protease-amylase (CREON 12,000) capsule 6 Cap     
Dose: 6 Cap Freq: 3 TIMES DAILY WITH MEALS Route: PO   
Start: 20 0800   
  
ondansetron (ZOFRAN) injection 4 mg     
Dose: 4 mg Freq: EVERY 4 HOURS AS NEEDED Route: IV x5 PRN Reason: Nausea or Vomiting   
  
pantoprazole (PROTONIX) 40 mg in 0.9% sodium chloride 10 mL injection     
Dose: 40 mg   
Freq: EVERY 24 HOURS Route: IV Start: 20 1418   
  
prochlorperazine (COMPAZINE) with saline injection 5 mg     
Dose: 5 mg Freq: EVERY 4 HOURS AS NEEDED Route: IV x3 PRN Reason: Nausea or Vomiting Start: 20 0834   
   
Letter of Determination by Eliu Howard RN  
 
   
Review Status  Review Entered In Primary  2020 15:09   
   
Criteria Review We recommend that the following pt's hospitalization under OBSERVATION [104] 
status   is upgraded to INPATIENT If you agree, please place a new ADMIT order 
in [facility]  as recommended. 
  
  
Name:          Gricelda Ferguson. :            1955 [facility]# :         [id number] Insurance:   [address] [place] [address]  
  
Clinical summary        patient with abdominal pain Vitals                           soft blood pressure Labs and Imaging       persistent elevation in bilirubin MCG criteria applies   NO Comments       patient with abdominal pain, history of Whipple's secondary to 
pancreatic cancer, MRCP pending, unclear etiology for abdominal pain but 
continues to have abdominal pain, on clear liquid diet, on IV fluids, requiring 
pain medication, needing medical optimization 
  
  
This chart was reviewed at 6:49 AM 2020  
   
Vomiting - Care Day  (2020) by Eliu Howard RN  
 
   
Review Status  Review Entered Completed  2020 15:03   
   
Criteria Review Care Day: 3 Care Date: 2020 Level of Care: Inpatient Floor Guideline Day 2 Level Of Care (X) Floor to discharge [B]   
2020 15:03:48 EDT by John Rocha   Floor Clinical Status ( ) * Hemodynamic stability ( ) * Vomiting absent or controlled ( ) * Electrolyte abnormalities absent or acceptable for next level of care ( ) * Life-threatening causes of vomiting excluded ( ) * Pain absent or managed ( ) * Discharge plans and education understood Activity ( ) * Ambulatory or acceptable for next level of care Routes ( ) * Oral hydration   
(X) * Liquid or advanced diet 9/4/2020 15:03:48 EDT by Ilya Perkins Clear liquid Interventions   
(X) Electrolytes 9/4/2020 15:03:48 EDT by Julita Luna Results for Doug Lugo (MRN [id number]) as of 9/4/2020 14:59 
 
9/4/2020 03:53 Sodium: 134 (L) Potassium: 3.7 Chloride: 101 * Milestone Additional Notes   
     
Patient still with abdominal pain in the lower abdomen, epigastric area that radiates to the back.   
    
Assessment & Plan:   
    
Abdominal pain   
-Acute onset abdominal pain, CT without acute pathology   
-History of chronic pancreatitis secondary to pancreatic duct stricture, prior stent in the duct was removed in July 2020   
-GI following, for EUS guided plexus block on 9/9/2020   
-General surgery evaluating the patient, plan for revision of George-en-Y next week   
    
Abnormal LFTs   
-Elevated alk phos and T bili, normal transaminases -MRCP shows no issues with biliary ducts   
    
Hydronephrosis -Left hydronephrosis, but no other obstruction noted on CT   
-MRCP showing left renal vein occlusion with retroperitoneal edema   
-Urology following   
    
Cholangiocarcinoma   
-History of cholangiocarcinoma with now recurrence extrahepatic involvement   
-Following oncology as outpatient, currently with radiation therapy   
    
BPH   
-Continue home medications   
    
Code status: FULL   
DVT prophylaxis: Heparin   
 Results for Doug Lugo (MRN [id number]) as of 9/4/2020 14:59   
  
9/4/2020 03:53 WBC: 5.1 NRBC: 0.0   
RBC: 3.70 (L) HGB: 11.8 (L) HCT: 35.1 (L) Results for Braden Phillips (MRN [id number]) as of 9/4/2020 14:59   
  
9/4/2020 03:53 Sodium: 134 (L) Potassium: 3.7 Chloride: 101 CO2: 27 Anion gap: 6 Glucose: 103 (H) BUN: 15   
Creatinine: 0.68 (L) BUN/Creatinine ratio: 22 (H) Calcium: 8.2 (L)   
GFR est non-AA: >60   
GFR est AA: >60 Bilirubin, total: 2.9 (H) Protein, total: 4.8 (L) Albumin: 2.4 (L) Globulin: 2.4 A-G Ratio: 1.0 (L) ALT: 31 AST: 12 (L) Alk. phosphatase: 138 (H)   
  
9/4/2020 13:10 Bilirubin, total: 2.7 (H) Bilirubin, direct: 0.8 (H) Bilirubin, indirect: 1.9 (H) General Surgery Consult-Assessment   
    
Justen Coyle. is a 62 y. 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 [facility] now presents with acutely worsened abdominal pain and ongoing bilious emesis.     
    
Plan   
    
- MRI shows left renal vein occlusion, left hydronephrosis and perirenal edema throughout the retroperitoneum.  I think this may be the result of acute left renal vein occlusion and developing collaterals that is likely related to either his tumor recurrence or radiation.  I would recommend getting input from Urology as to whether there is any recommended intervention in this case that may be helpful for his pain.     
- Dr. Ade Coyle for celiac plexus block next week to assist with pain control from tumor recurrence in that area.    
- I discussed performing a conversion of his duodenojejunostomy to a eren-en Y reconstruction next week to help his longstanding emesis which is from bile reflux gastritis.  Hopefully this can be set up for Wednesday.     
- Etiology of his elevated bilirubin unclear.  I have ordered a fractionated bilirubin to see if this is obstructive or not.  This may be Gilbert's or a hemolytic source if predominantly unconjugated as no signs of biliary anastomotic stricture or ductal dilation is noted on MRCP.     
- No pancreatic ductal dilation noted on MRCP, so I would recommend leaving this anastomosis alone currently.     
  
Palliative Consult-SUMMARY:   
Jaclyn Padilla is a 62y.o. year old with a  history of extrahepatic cholangiocarcinoma status post pancreaticoduodenectomy in 2017 followed by chemotherapy, disease-free for 2.5 years, recent recurrence of disease in para-aortic soft tissue status post RT, history of A. 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  His chronic back pain has been controlled with fentanyl patch 37.5 mcg every 72 hours. 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 Allyn Burkitt is second marriage for both of them.   
    
He is now admitted with intractable bilateral flank pain, biliary emesis and nausea.  MRI shows renal vein thrombosis   
    
Current hospitalization-plan for celiac plexus block.  Reversal of Whipple procedure planned for next Wednesday-9/9/2020   
    
    
 PALLIATIVE DIAGNOSES:   
1. Bilateral flank pain 2. chronic low back pain- on fentanyl 37.5 mcg patch plus tramadol 3 tablets/day 3.  Chronic nausea and vomiting   
    
    
 PLAN:   
1. New bilateral flank pain-  MRI shows renal vein thrombosis.  He is currently on IV Dilaudid with moderate relief.  Received 3 doses of IV Dilaudid 2 mg.   
            - Given patient is planned for reversal of Whipple next Wednesday, restart p.o. Dilaudid to his not only dependent on IV opioids for pain medication.   
            -Start Dilaudid p.o. 2 to 4 mg every 4 hours as needed.             -Chronic back pain-patient has been on fentanyl 37.5 mcg patch every 72 hours with good relief.  His patch was removed on Tuesday.  This needs to be restarted in order to help with his back pain and preferably decrease his need for IV pain medication. Nellie Ksenia that we do not have 37.5 mcg patch in our formulary, I am placing an order for 25 mcg +12 mcg patch.   
            -He is getting a celiac plexus block   
    
2. Constipation-he tends to struggle with diarrhea most of the time and then severe constipation.  Important to prevent constipation with increasing opioids and flank pain.  Please start Zeny-Colace 2 tablets daily and MiraLAX as needed.  Okay to hold if patient is having loose stools or diarrhea. 3. Intractable nausea- On IV Zofran with good relief.   
    
4. Patient will remain in hospital for the next several days, plan for reversal of Whipple next Wednesday.  Our team will follow to help with pain control.  Please call palliative medicine at [phone] should patient have uncontrolled pain.   
    
5. iInitial consult note routed to primary continuity provider and/or primary health care team members 6. Communicated plan of care with: Palliative IDT, [facility] Team   
  
GI Consult-   
Assessment/Plan Abdominal pain, N/V:   
Chronic pancreatic duct stricture previously treated with stenting - Stent removed 7/2020, but unable to replace - ALP and TBili improving - Lipase normal - Previous triglyceride and IgG4 normal   
- Plan for EUS-guided celiac plexus block with Dr. Joycelyn Martinez on 9/9/20   
- Dr. Zamzam Zavala of previous Whipple next week as well   
- Pain management and anti-emetics prn   
    
Extrahepatic cholangiocarcinoma: diagnosed 2017, treated with pylorus-preserving Whipple and chemo - T3 N1 (1 of 12 LN +ve); Invasion into pancreas; R0 resection   
- CT guided biopsy 2/3/2020 of paraaortic soft tissue mass - positive for adenocarcinoma - PET scan 6/2020 showed increased size - Follows with Dr. Cyndie Gonzalez and has been seen by Palliative OP   
    
Medications,   
dutasteride (AVODART) capsule 0.5 mg     
Dose: 0.5 mg   
Freq: DAILY Route: PO   
Start: 09/03/20 0900   
 Admin Instructions:   
  
fentaNYL (DURAGESIC) 12 mcg/hr patch 1 Patch     
Dose: 1 Patch Freq: EVERY 72 HOURS Route: TD Start: 09/04/20 1400 HYDROmorphone (DILAUDID) tablet 2 mg PO x1 Dose: 2-4 mg Freq: EVERY 4 HOURS AS NEEDED Route: PO   
PRN Reason: Severe Pain HYDROmorphone (PF) (DILAUDID) injection 2 mg     
Dose: 2 mg Freq: EVERY 3 HOURS AS NEEDED Route: IV x4 PRN Reason: Severe Pain Start: 09/03/20 1720   
  
ondansetron (ZOFRAN) injection 4 mg     
Dose: 4 mg Freq: EVERY 4 HOURS AS NEEDED Route: IV x3 PRN Reason: Nausea or Vomiting Start: 09/02/20 1506   
  
  
   
Vomiting - Care Day (9/2/2020) by James Fuetnes RN  
 
   
Review Status  Review Entered Completed  9/3/2020 07:53   
   
Criteria Review Care Day: 1 Care Date: 9/2/2020 Level of Care: Inpatient Floor Guideline Day 1 Level Of Care (X) Floor 9/3/2020 07:53:30 EDT by Kristofer Mendez [facility] Clinical Status ( ) * Clinical Indications met [A]   
9/3/2020 07:53:30 EDT by Dixie Avendano   
  61 y/o admitted with vomiting Routes (X) IV fluids, medications 9/3/2020 07:53:30 EDT by Dixie Avendano   
  LR IVF Interventions (X) CBC, chemistries 9/3/2020 07:53:30 EDT by Dixie Avendano   
  hgb 14.6, HCT 43.4 * Milestone Additional Notes Patient came through the ED-Abdominal Pain     
This is a recurrent problem. The current episode started more than 2 days ago. The problem occurs constantly. The problem has been gradually worsening. The pain is associated with vomiting and previous surgery. The pain is located in the epigastric region.  The pain is at a severity of 10/10. The pain is severe. Associated symptoms include anorexia, nausea and vomiting. Pertinent negatives include no fever, no belching, no diarrhea, no flatus, no hematochezia, no melena, no constipation, no dysuria, no frequency, no hematuria, no headaches, no arthralgias, no myalgias, no trauma, no chest pain and no back pain. Nothing worsens the pain. The pain is relieved by nothing. Past workup includes CT scan, ultrasound, esophagogastroduodenoscopy. His past medical history is significant for cancer and DM. Results for Lina Huerta (MRN [id number]) as of 9/3/2020 07:52   
  
9/2/2020 07:54 WBC: 9.1 NRBC: 0.0   
RBC: 4.57 HGB: 14.6 HCT: 43.4 Results for Lina Huerta (MRN [id number]) as of 9/3/2020 07:52   
  
9/2/2020 07:54 Sodium: 137 Potassium: 3.9 Chloride: 104 CO2: 27 Anion gap: 6 Glucose: 106 (H) BUN: 19 Creatinine: 0.82   
BUN/Creatinine ratio: 23 (H) CT abdomen pelvis-IMPRESSION IMPRESSION:   
Mild left hydronephrosis and delayed nephrogram of uncertain etiology as a   
ureteral stone is not visualized. Status post Whipple. Paraceliac soft tissue   
abnormality is unchanged compared to the prior exam. Inflammatory changes in the   
left anterior pararenal space unchanged. Gastrointestinal: Positive for abdominal pain, anorexia, nausea and vomiting Physical Exam   
Vitals signs and nursing note reviewed. Constitutional:     
   General: He is not in acute distress.   
   Appearance: He is well-developed. He is not diaphoretic. HENT:   
   Head: Normocephalic and atraumatic. Eyes:   
   Pupils: Pupils are equal, round, and reactive to light. Neck:   
   Musculoskeletal: Normal range of motion and neck supple. Cardiovascular:   
   Rate and Rhythm: Normal rate and regular rhythm.   
   Heart sounds: Normal heart sounds. No murmur. No friction rub. No gallop.     
Pulmonary:    Effort: Pulmonary effort is normal. No respiratory distress.   
   Breath sounds: Normal breath sounds. No wheezing. Abdominal:   
   General: Bowel sounds are normal. There is no distension.   
   Palpations: Abdomen is soft.   
   Tenderness: There is generalized abdominal tenderness. There is no guarding or rebound. Musculoskeletal: Normal range of motion. Skin:   
   General: Skin is warm.   
   Findings: No rash. Neurological:   
   Mental Status: He is alert and oriented to person, place, and time. Psychiatric:       
   Behavior: Behavior normal.       
   Thought Content: Thought content normal.       
   Judgment: Judgment normal.   
  
Medications,   
heparin (porcine) injection 5,000 Units     
Dose: 5,000 Units Freq: EVERY 8 HOURS Route: SC   
  
HYDROmorphone (PF) (DILAUDID) injection 1 mg     
Dose: 1 mg Freq: EVERY 4 HOURS AS NEEDED Route: IV x2 PRN Reason: Severe Pain Start: 09/02/20 1540   
  
lactated Ringers infusion     
Rate: 100 mL/hr Dose: 100 mL/hr Freq: CONTINUOUS Route: IV Start: 09/02/20 1541   
  
fentaNYL citrate (PF) injection 25 mcg     
Dose: 25 mcg Freq: NOW Route: IV x1   
Start: 09/02/20 0810 End: 09/02/20 3969 promethazine (PHENERGAN) 25 mg in NS IVPB     
Dose: 25 mg   
Freq: NOW Route: IV x1   
Start: 09/02/20 1057 End: 09/02/20 1156   
  
sodium chloride 0.9 % bolus infusion 1,000 mL     
Dose: 1,000 mL Freq: NOW Route: IV Start: 09/02/20 0810 End: 09/02/20 1028 Assessment:   
    
Abdominal pain with nausea and vomiting: Unclear etiology, possible acute on chronic pancreatitis, patient will be observed on a medical bed, IV hydration, pain control, patient with hyperbilirubinemia, right upper quadrant ultrasound, n.p.o., GI consult, supportive care and close monitoring, IV antiemetics, if symptoms persist may consider further intervention and diagnostics, reassess as needed   
    
 Dehydration: Patient appears to be dehydrated, gentle IV hydration, repeat labs in the morning, continue to monitor   
    
History of pancreatic cancer: Patient's oncologist is Dr. Madhu Ryan, may see consult by patient in house, supportive care, pain control, close monitoring   
    
Left hydronephrosis: Unclear etiology, ? related to malignancy, obtain UA, urology consult, pain control, supportive care, close monitoring   
    
    
Diabetes mellitus type 2: Sliding scale level on insulin, Accu-Cheks, diet control, close monitoring, further intervention per hospital course   
    
Hypertension: Continue to monitor   
    
GI DVT prophylaxis: Patient on heparin   
    
  
  
   
Vomiting - Clinical Indications for Admission to Inpatient Care by Niall Pritchard RN  
 
   
Review Status  Review Entered Completed  9/3/2020 07:51   
   
Criteria Review Clinical Indications for Admission to Inpatient Care Most Recent : Emerald Garcias Most Recent Date: 9/3/2020 07:51:51 EDT ( ) Admission is indicated for  1 or more  of the following  (1) (2) (3):   
   ( ) Severe pain requiring acute inpatient management   
   9/3/2020 07:51:51 EDT by Emerald Garcias   
     The pain is located in the epigastric region. The pain is at a severity of 10/10. The pain is severe

## 2020-09-08 NOTE — PROGRESS NOTES
Clinical Pharmacy Note: IV to PO Automatic Conversion  Please note: Lorenzo chowdary medication(s) (pantoprazole) has/have been changed from IV to PO based on the following critiera:    Patient is taking scheduled oral medications  Patient is tolerating tube feeds at goal rate or a full liquid, soft or regular diet    This IV to PO conversion is based on the [employer] approved automatic conversion policy for eligible patients. Please call with questions.

## 2020-09-08 NOTE — PROGRESS NOTES
[place]:  1. Gen. Surgery following- surgery on Wednesday. 2. Home when stable.     Reynaldo Rios [facility]

## 2020-09-08 NOTE — PROGRESS NOTES
[address]  [address] [facility] 140 Octavio Pradhan, [address]  [phone]                GI PROGRESS NOTE      NAME:   Rosellen Prader. :    1955   MRN:    [id number]     Assessment/Plan   Abdominal pain, N/V:  Chronic pancreatic duct stricture previously treated with stenting  - Stent removed 2020, but unable to replace  - ALP and TBili improving  - Lipase normal - Previous triglyceride and IgG4 normal  - Plan for EUS-guided celiac plexus block with Dr. Sherly Gutiérrez on 20  - Dr. Gann Courser revision of previous Whipple on 2020  - Pain management and anti-emetics prn  -NPO after midnight     Extrahepatic cholangiocarcinoma: diagnosed , treated with pylorus-preserving Whipple and chemo  - T3 N1 (1 of 12 LN +ve); Invasion into pancreas; R0 resection  - CT guided biopsy 2/3/2020 of paraaortic soft tissue mass - positive for adenocarcinoma  - PET scan 2020 showed increased size  - Follows with Dr. Sho Ayers and has been seen by Palliative OP    Will discuss with Dr. Megan Malone      Patient Active Problem List   Diagnosis Code    Obstructive jaundice K83.1    Common bile duct (CBD) obstruction K83.1    UTI (urinary tract infection) N39.0    Cholangiocarcinoma of biliary tract ([address] C22.1    Cholangiocarcinoma determined by biopsy of biliary tract ([address] C24.9    Paroxysmal atrial fibrillation ([address] I48.0    S/P ablation of atrial fibrillation Z98.890, Z86.79    Essential hypertension I10    Cramps, muscle, general R25.2    Low vitamin D level R79.89    Low vitamin B12 level E53.8    Vomiting R11.10    Controlled type 2 diabetes mellitus without complication, without long-term current use of insulin (HCC) E11.9    Acute pancreatitis K85.90    Leukocytosis D72.829    Pancreatitis K85.90    Abdominal pain R10.9    Nausea & vomiting R11.2       Subjective:     Rosellen Prader. is a 62 y.o.  male Patient without any complaint this morning.  No nausea, no vomiting, no chest pain, no abdominal pain. Pain medication helpful. Tolerating liquids. Stated had loose bowel movement yesterday, no presence of blood. Objective:     VITALS:   Last 24hrs VS reviewed since prior hospitalist progress note. Most recent are:  Visit Vitals  /66 (BP 1 Location: Left arm, BP Patient Position: Sitting)   Pulse 78   Temp 97.9 °F (36.6 °C)   Resp 18   Ht 5' 8\" (1.727 m)   Wt 68.4 kg (150 lb 11.2 oz)   SpO2 97%   BMI 22.91 kg/m²       Intake/Output Summary (Last 24 hours) at 9/8/2020 0950  Last data filed at 9/8/2020 1622  Gross per 24 hour   Intake 480 ml   Output    Net 480 ml        PHYSICAL EXAM:  General   well developed, well nourished, in no acute distress  EENT  Normocephalic, Atraumatic,  sclera clear  Respiratory   Clear To Auscultation   Cardiology  Regular Rate and Rhythm   Abdominal  Soft, non-tender, non-distended, bowel sounds present   Neurological  No focal neurological deficits noted  Psychological  Oriented x 3. Normal affect     Lab Data   Recent Results (from the past 12 hour(s))   METABOLIC PANEL, COMPREHENSIVE    Collection Time: 09/08/20  4:00 AM   Result Value Ref Range    Sodium 136 136 - 145 mmol/L    Potassium 3.7 3.5 - 5.1 mmol/L    Chloride 103 97 - 108 mmol/L    CO2 27 21 - 32 mmol/L    Anion gap 6 5 - 15 mmol/L    Glucose 114 (H) 65 - 100 mg/dL    BUN 5 (L) 6 - 20 MG/DL    Creatinine 0.66 (L) 0.70 - 1.30 MG/DL    BUN/Creatinine ratio 8 (L) 12 - 20      GFR est AA >60 >60 ml/min/1.73m2    GFR est non-AA >60 >60 ml/min/1.73m2    Calcium 8.7 8.5 - 10.1 MG/DL    Bilirubin, total 1.3 (H) 0.2 - 1.0 MG/DL    ALT (SGPT) 68 12 - 78 U/L    AST (SGOT) 30 15 - 37 U/L    Alk. phosphatase 280 (H) 45 - 117 U/L    Protein, total 5.7 (L) 6.4 - 8.2 g/dL    Albumin 2.8 (L) 3.5 - 5.0 g/dL    Globulin 2.9 2.0 - 4.0 g/dL    A-G Ratio 1.0 (L) 1.1 - 2.2           Medications: Reviewed  Regulo Lorenzana NP  I have examined the patient.   I have reviewed the chart and agree with the documentation recorded by the NP, including the assessment, treatment plan, and disposition.       Sha Alonzo MD

## 2020-09-09 ENCOUNTER — ANESTHESIA (OUTPATIENT)
Dept: ENDOSCOPY | Age: 65
DRG: 423 | End: 2020-09-09
Payer: COMMERCIAL

## 2020-09-09 ENCOUNTER — ANESTHESIA EVENT (OUTPATIENT)
Dept: SURGERY | Age: 65
DRG: 423 | End: 2020-09-09
Payer: COMMERCIAL

## 2020-09-09 ENCOUNTER — ANESTHESIA (OUTPATIENT)
Dept: SURGERY | Age: 65
DRG: 423 | End: 2020-09-09
Payer: COMMERCIAL

## 2020-09-09 ENCOUNTER — APPOINTMENT (OUTPATIENT)
Dept: ULTRASOUND IMAGING | Age: 65
DRG: 423 | End: 2020-09-09
Attending: INTERNAL MEDICINE
Payer: COMMERCIAL

## 2020-09-09 ENCOUNTER — ANESTHESIA EVENT (OUTPATIENT)
Dept: ENDOSCOPY | Age: 65
DRG: 423 | End: 2020-09-09
Payer: COMMERCIAL

## 2020-09-09 LAB
ALBUMIN SERPL-MCNC: 2.6 G/DL (ref 3.5–5)
ALBUMIN/GLOB SERPL: 1 {RATIO} (ref 1.1–2.2)
ALP SERPL-CCNC: 250 U/L (ref 45–117)
ALT SERPL-CCNC: 59 U/L (ref 12–78)
ANION GAP SERPL CALC-SCNC: 4 MMOL/L (ref 5–15)
APTT PPP: 29.9 SEC (ref 22.1–32)
AST SERPL-CCNC: 25 U/L (ref 15–37)
BASOPHILS # BLD: 0 K/UL (ref 0–0.1)
BASOPHILS NFR BLD: 0 % (ref 0–1)
BILIRUB SERPL-MCNC: 0.8 MG/DL (ref 0.2–1)
BUN SERPL-MCNC: 10 MG/DL (ref 6–20)
BUN/CREAT SERPL: 18 (ref 12–20)
CALCIUM SERPL-MCNC: 8.7 MG/DL (ref 8.5–10.1)
CHLORIDE SERPL-SCNC: 107 MMOL/L (ref 97–108)
CO2 SERPL-SCNC: 29 MMOL/L (ref 21–32)
CREAT SERPL-MCNC: 0.57 MG/DL (ref 0.7–1.3)
DIFFERENTIAL METHOD BLD: ABNORMAL
EOSINOPHIL # BLD: 0.4 K/UL (ref 0–0.4)
EOSINOPHIL NFR BLD: 7 % (ref 0–7)
ERYTHROCYTE [DISTWIDTH] IN BLOOD BY AUTOMATED COUNT: 12.5 % (ref 11.5–14.5)
GLOBULIN SER CALC-MCNC: 2.7 G/DL (ref 2–4)
GLUCOSE BLD STRIP.AUTO-MCNC: 129 MG/DL (ref 65–100)
GLUCOSE BLD STRIP.AUTO-MCNC: 185 MG/DL (ref 65–100)
GLUCOSE SERPL-MCNC: 110 MG/DL (ref 65–100)
HCT VFR BLD AUTO: 33.7 % (ref 36.6–50.3)
HGB BLD-MCNC: 11.2 G/DL (ref 12.1–17)
IMM GRANULOCYTES # BLD AUTO: 0.1 K/UL (ref 0–0.04)
IMM GRANULOCYTES NFR BLD AUTO: 1 % (ref 0–0.5)
INR PPP: 1.1 (ref 0.9–1.1)
LYMPHOCYTES # BLD: 0.7 K/UL (ref 0.8–3.5)
LYMPHOCYTES NFR BLD: 13 % (ref 12–49)
MCH RBC QN AUTO: 31.3 PG (ref 26–34)
MCHC RBC AUTO-ENTMCNC: 33.2 G/DL (ref 30–36.5)
MCV RBC AUTO: 94.1 FL (ref 80–99)
MONOCYTES # BLD: 0.5 K/UL (ref 0–1)
MONOCYTES NFR BLD: 9 % (ref 5–13)
NEUTS SEG # BLD: 3.9 K/UL (ref 1.8–8)
NEUTS SEG NFR BLD: 70 % (ref 32–75)
NRBC # BLD: 0 K/UL (ref 0–0.01)
NRBC BLD-RTO: 0 PER 100 WBC
PLATELET # BLD AUTO: 133 K/UL (ref 150–400)
PMV BLD AUTO: 10.8 FL (ref 8.9–12.9)
POTASSIUM SERPL-SCNC: 3.8 MMOL/L (ref 3.5–5.1)
PROT SERPL-MCNC: 5.3 G/DL (ref 6.4–8.2)
PROTHROMBIN TIME: 11.5 SEC (ref 9–11.1)
RBC # BLD AUTO: 3.58 M/UL (ref 4.1–5.7)
RBC MORPH BLD: ABNORMAL
SERVICE CMNT-IMP: ABNORMAL
SERVICE CMNT-IMP: ABNORMAL
SODIUM SERPL-SCNC: 140 MMOL/L (ref 136–145)
THERAPEUTIC RANGE,PTTT: NORMAL SECS (ref 58–77)
WBC # BLD AUTO: 5.6 K/UL (ref 4.1–11.1)

## 2020-09-09 PROCEDURE — 74011250636 HC RX REV CODE- 250/636: Performed by: FAMILY MEDICINE

## 2020-09-09 PROCEDURE — 74011250636 HC RX REV CODE- 250/636: Performed by: INTERNAL MEDICINE

## 2020-09-09 PROCEDURE — 74011250636 HC RX REV CODE- 250/636: Performed by: SURGERY

## 2020-09-09 PROCEDURE — 77030008684 HC TU ET CUF COVD -B: Performed by: NURSE ANESTHETIST, CERTIFIED REGISTERED

## 2020-09-09 PROCEDURE — 82962 GLUCOSE BLOOD TEST: CPT

## 2020-09-09 PROCEDURE — 74011250636 HC RX REV CODE- 250/636: Performed by: ANESTHESIOLOGY

## 2020-09-09 PROCEDURE — 77030037032 HC INSRT SCIS CLICKLLINE DISP STOR -B: Performed by: SURGERY

## 2020-09-09 PROCEDURE — 74011000258 HC RX REV CODE- 258: Performed by: INTERNAL MEDICINE

## 2020-09-09 PROCEDURE — 77030009965 HC RELD STPLR ENDOS COVD -D: Performed by: SURGERY

## 2020-09-09 PROCEDURE — 77030009851 HC PCH RTVR ENDOSC AMR -B: Performed by: SURGERY

## 2020-09-09 PROCEDURE — 74011250637 HC RX REV CODE- 250/637: Performed by: ANESTHESIOLOGY

## 2020-09-09 PROCEDURE — 74011000250 HC RX REV CODE- 250: Performed by: INTERNAL MEDICINE

## 2020-09-09 PROCEDURE — 77030005513 HC CATH URETH FOL11 MDII -B: Performed by: SURGERY

## 2020-09-09 PROCEDURE — 77030034628 HC LIGASURE LAP SEAL DIV MD COVD -F: Performed by: SURGERY

## 2020-09-09 PROCEDURE — 77030020053 HC ELECTRD LAPSCP COVD -B: Performed by: SURGERY

## 2020-09-09 PROCEDURE — 74011250637 HC RX REV CODE- 250/637: Performed by: INTERNAL MEDICINE

## 2020-09-09 PROCEDURE — 77030008771 HC TU NG SALEM SUMP -A: Performed by: NURSE ANESTHETIST, CERTIFIED REGISTERED

## 2020-09-09 PROCEDURE — 74011000250 HC RX REV CODE- 250: Performed by: NURSE ANESTHETIST, CERTIFIED REGISTERED

## 2020-09-09 PROCEDURE — 77030020829: Performed by: SURGERY

## 2020-09-09 PROCEDURE — 74011000250 HC RX REV CODE- 250: Performed by: SURGERY

## 2020-09-09 PROCEDURE — 74011250636 HC RX REV CODE- 250/636: Performed by: NURSE ANESTHETIST, CERTIFIED REGISTERED

## 2020-09-09 PROCEDURE — 77030003578 HC NDL INSUF VERES AMR -B: Performed by: SURGERY

## 2020-09-09 PROCEDURE — 74011250637 HC RX REV CODE- 250/637: Performed by: NURSE PRACTITIONER

## 2020-09-09 PROCEDURE — 77030008756 HC TU IRR SUC STRY -B: Performed by: SURGERY

## 2020-09-09 PROCEDURE — 77030010507 HC ADH SKN DERMBND J&J -B: Performed by: SURGERY

## 2020-09-09 PROCEDURE — 65270000029 HC RM PRIVATE

## 2020-09-09 PROCEDURE — 77030028690 HC NDL ASPIR ULTRSND BSC -E: Performed by: INTERNAL MEDICINE

## 2020-09-09 PROCEDURE — 3E0T3GC INTRODUCTION OF OTHER THERAPEUTIC SUBSTANCE INTO PERIPHERAL NERVES AND PLEXI, PERCUTANEOUS APPROACH: ICD-10-PCS | Performed by: INTERNAL MEDICINE

## 2020-09-09 PROCEDURE — 77030002996 HC SUT SLK J&J -A: Performed by: SURGERY

## 2020-09-09 PROCEDURE — 74011000258 HC RX REV CODE- 258: Performed by: SURGERY

## 2020-09-09 PROCEDURE — 77030011640 HC PAD GRND REM COVD -A: Performed by: SURGERY

## 2020-09-09 PROCEDURE — 77030013079 HC BLNKT BAIR HGGR 3M -A: Performed by: NURSE ANESTHETIST, CERTIFIED REGISTERED

## 2020-09-09 PROCEDURE — P9045 ALBUMIN (HUMAN), 5%, 250 ML: HCPCS | Performed by: NURSE ANESTHETIST, CERTIFIED REGISTERED

## 2020-09-09 PROCEDURE — 77030016151 HC PROTCTR LNS DFOG COVD -B: Performed by: SURGERY

## 2020-09-09 PROCEDURE — 77030040361 HC SLV COMPR DVT MDII -B: Performed by: SURGERY

## 2020-09-09 PROCEDURE — 80053 COMPREHEN METABOLIC PANEL: CPT

## 2020-09-09 PROCEDURE — 77030012770 HC TRCR OPT FX AMR -B: Performed by: SURGERY

## 2020-09-09 PROCEDURE — 76040000019: Performed by: INTERNAL MEDICINE

## 2020-09-09 PROCEDURE — 36415 COLL VENOUS BLD VENIPUNCTURE: CPT

## 2020-09-09 PROCEDURE — 76060000031 HC ANESTHESIA FIRST 0.5 HR: Performed by: INTERNAL MEDICINE

## 2020-09-09 PROCEDURE — 77030002933 HC SUT MCRYL J&J -A: Performed by: SURGERY

## 2020-09-09 PROCEDURE — 77030022704 HC SUT VLOC COVD -B: Performed by: SURGERY

## 2020-09-09 PROCEDURE — 77030039961 HC KT CUST COLON BSC -D: Performed by: SURGERY

## 2020-09-09 PROCEDURE — 85025 COMPLETE CBC W/AUTO DIFF WBC: CPT

## 2020-09-09 PROCEDURE — 85730 THROMBOPLASTIN TIME PARTIAL: CPT

## 2020-09-09 PROCEDURE — 76010000174 HC OR TIME 3.5 TO 4 HR INTENSV-TIER 1: Performed by: SURGERY

## 2020-09-09 PROCEDURE — 76210000000 HC OR PH I REC 2 TO 2.5 HR: Performed by: SURGERY

## 2020-09-09 PROCEDURE — 43850: CPT | Performed by: SURGERY

## 2020-09-09 PROCEDURE — 76060000038 HC ANESTHESIA 3.5 TO 4 HR: Performed by: SURGERY

## 2020-09-09 PROCEDURE — 85610 PROTHROMBIN TIME: CPT

## 2020-09-09 PROCEDURE — 77030026438 HC STYL ET INTUB CARD -A: Performed by: NURSE ANESTHETIST, CERTIFIED REGISTERED

## 2020-09-09 PROCEDURE — 0D164ZA BYPASS STOMACH TO JEJUNUM, PERCUTANEOUS ENDOSCOPIC APPROACH: ICD-10-PCS | Performed by: SURGERY

## 2020-09-09 PROCEDURE — 77030018836 HC SOL IRR NACL ICUM -A: Performed by: SURGERY

## 2020-09-09 PROCEDURE — 77030031139 HC SUT VCRL2 J&J -A: Performed by: SURGERY

## 2020-09-09 PROCEDURE — 0D194ZA BYPASS DUODENUM TO JEJUNUM, PERCUTANEOUS ENDOSCOPIC APPROACH: ICD-10-PCS | Performed by: SURGERY

## 2020-09-09 RX ORDER — SODIUM CHLORIDE 0.9 % (FLUSH) 0.9 %
5-40 SYRINGE (ML) INJECTION AS NEEDED
Status: DISCONTINUED | OUTPATIENT
Start: 2020-09-09 | End: 2020-09-11 | Stop reason: HOSPADM

## 2020-09-09 RX ORDER — ROCURONIUM BROMIDE 10 MG/ML
INJECTION, SOLUTION INTRAVENOUS AS NEEDED
Status: DISCONTINUED | OUTPATIENT
Start: 2020-09-09 | End: 2020-09-09 | Stop reason: HOSPADM

## 2020-09-09 RX ORDER — SODIUM CHLORIDE 0.9 % (FLUSH) 0.9 %
5-40 SYRINGE (ML) INJECTION AS NEEDED
Status: DISCONTINUED | OUTPATIENT
Start: 2020-09-09 | End: 2020-09-09 | Stop reason: HOSPADM

## 2020-09-09 RX ORDER — SODIUM CHLORIDE 9 MG/ML
50 INJECTION, SOLUTION INTRAVENOUS CONTINUOUS
Status: DISCONTINUED | OUTPATIENT
Start: 2020-09-09 | End: 2020-09-09

## 2020-09-09 RX ORDER — SIMETHICONE 80 MG
80 TABLET,CHEWABLE ORAL
Status: DISCONTINUED | OUTPATIENT
Start: 2020-09-09 | End: 2020-09-11 | Stop reason: HOSPADM

## 2020-09-09 RX ORDER — SODIUM CHLORIDE 0.9 % (FLUSH) 0.9 %
5-40 SYRINGE (ML) INJECTION EVERY 8 HOURS
Status: DISCONTINUED | OUTPATIENT
Start: 2020-09-09 | End: 2020-09-09 | Stop reason: HOSPADM

## 2020-09-09 RX ORDER — KETAMINE HYDROCHLORIDE 10 MG/ML
INJECTION, SOLUTION INTRAMUSCULAR; INTRAVENOUS AS NEEDED
Status: DISCONTINUED | OUTPATIENT
Start: 2020-09-09 | End: 2020-09-09 | Stop reason: HOSPADM

## 2020-09-09 RX ORDER — DEXMEDETOMIDINE HYDROCHLORIDE 100 UG/ML
INJECTION, SOLUTION INTRAVENOUS AS NEEDED
Status: DISCONTINUED | OUTPATIENT
Start: 2020-09-09 | End: 2020-09-09 | Stop reason: HOSPADM

## 2020-09-09 RX ORDER — FENTANYL CITRATE 50 UG/ML
INJECTION, SOLUTION INTRAMUSCULAR; INTRAVENOUS AS NEEDED
Status: DISCONTINUED | OUTPATIENT
Start: 2020-09-09 | End: 2020-09-09 | Stop reason: HOSPADM

## 2020-09-09 RX ORDER — FLUMAZENIL 0.1 MG/ML
0.2 INJECTION INTRAVENOUS
Status: DISCONTINUED | OUTPATIENT
Start: 2020-09-09 | End: 2020-09-09 | Stop reason: HOSPADM

## 2020-09-09 RX ORDER — ONDANSETRON 2 MG/ML
4 INJECTION INTRAMUSCULAR; INTRAVENOUS AS NEEDED
Status: DISCONTINUED | OUTPATIENT
Start: 2020-09-09 | End: 2020-09-09 | Stop reason: HOSPADM

## 2020-09-09 RX ORDER — NEOSTIGMINE METHYLSULFATE 1 MG/ML
INJECTION, SOLUTION INTRAVENOUS AS NEEDED
Status: DISCONTINUED | OUTPATIENT
Start: 2020-09-09 | End: 2020-09-09 | Stop reason: HOSPADM

## 2020-09-09 RX ORDER — HYDROMORPHONE HYDROCHLORIDE 2 MG/ML
INJECTION, SOLUTION INTRAMUSCULAR; INTRAVENOUS; SUBCUTANEOUS AS NEEDED
Status: DISCONTINUED | OUTPATIENT
Start: 2020-09-09 | End: 2020-09-09 | Stop reason: HOSPADM

## 2020-09-09 RX ORDER — HYDROMORPHONE HYDROCHLORIDE 1 MG/ML
0.2 INJECTION, SOLUTION INTRAMUSCULAR; INTRAVENOUS; SUBCUTANEOUS
Status: DISCONTINUED | OUTPATIENT
Start: 2020-09-09 | End: 2020-09-09 | Stop reason: HOSPADM

## 2020-09-09 RX ORDER — ACETAMINOPHEN 325 MG/1
650 TABLET ORAL ONCE
Status: COMPLETED | OUTPATIENT
Start: 2020-09-09 | End: 2020-09-09

## 2020-09-09 RX ORDER — SODIUM CHLORIDE, SODIUM LACTATE, POTASSIUM CHLORIDE, CALCIUM CHLORIDE 600; 310; 30; 20 MG/100ML; MG/100ML; MG/100ML; MG/100ML
75 INJECTION, SOLUTION INTRAVENOUS CONTINUOUS
Status: DISCONTINUED | OUTPATIENT
Start: 2020-09-09 | End: 2020-09-09 | Stop reason: HOSPADM

## 2020-09-09 RX ORDER — LIDOCAINE HYDROCHLORIDE 10 MG/ML
0.1 INJECTION, SOLUTION EPIDURAL; INFILTRATION; INTRACAUDAL; PERINEURAL AS NEEDED
Status: DISCONTINUED | OUTPATIENT
Start: 2020-09-09 | End: 2020-09-09 | Stop reason: HOSPADM

## 2020-09-09 RX ORDER — GLYCOPYRROLATE 0.2 MG/ML
INJECTION INTRAMUSCULAR; INTRAVENOUS AS NEEDED
Status: DISCONTINUED | OUTPATIENT
Start: 2020-09-09 | End: 2020-09-09 | Stop reason: HOSPADM

## 2020-09-09 RX ORDER — EPINEPHRINE 0.1 MG/ML
1 INJECTION INTRACARDIAC; INTRAVENOUS
Status: DISCONTINUED | OUTPATIENT
Start: 2020-09-09 | End: 2020-09-09 | Stop reason: HOSPADM

## 2020-09-09 RX ORDER — DEXTROMETHORPHAN/PSEUDOEPHED 2.5-7.5/.8
1.2 DROPS ORAL
Status: DISCONTINUED | OUTPATIENT
Start: 2020-09-09 | End: 2020-09-09 | Stop reason: HOSPADM

## 2020-09-09 RX ORDER — PROPOFOL 10 MG/ML
INJECTION, EMULSION INTRAVENOUS AS NEEDED
Status: DISCONTINUED | OUTPATIENT
Start: 2020-09-09 | End: 2020-09-09 | Stop reason: HOSPADM

## 2020-09-09 RX ORDER — MIDAZOLAM HYDROCHLORIDE 1 MG/ML
0.5 INJECTION, SOLUTION INTRAMUSCULAR; INTRAVENOUS
Status: DISCONTINUED | OUTPATIENT
Start: 2020-09-09 | End: 2020-09-09 | Stop reason: HOSPADM

## 2020-09-09 RX ORDER — LIDOCAINE HYDROCHLORIDE 20 MG/ML
INJECTION, SOLUTION EPIDURAL; INFILTRATION; INTRACAUDAL; PERINEURAL AS NEEDED
Status: DISCONTINUED | OUTPATIENT
Start: 2020-09-09 | End: 2020-09-09 | Stop reason: HOSPADM

## 2020-09-09 RX ORDER — ONDANSETRON 2 MG/ML
INJECTION INTRAMUSCULAR; INTRAVENOUS AS NEEDED
Status: DISCONTINUED | OUTPATIENT
Start: 2020-09-09 | End: 2020-09-09 | Stop reason: HOSPADM

## 2020-09-09 RX ORDER — BUPIVACAINE HYDROCHLORIDE AND EPINEPHRINE 2.5; 5 MG/ML; UG/ML
INJECTION, SOLUTION EPIDURAL; INFILTRATION; INTRACAUDAL; PERINEURAL AS NEEDED
Status: DISCONTINUED | OUTPATIENT
Start: 2020-09-09 | End: 2020-09-09 | Stop reason: HOSPADM

## 2020-09-09 RX ORDER — SODIUM CHLORIDE, SODIUM LACTATE, POTASSIUM CHLORIDE, CALCIUM CHLORIDE 600; 310; 30; 20 MG/100ML; MG/100ML; MG/100ML; MG/100ML
125 INJECTION, SOLUTION INTRAVENOUS CONTINUOUS
Status: DISCONTINUED | OUTPATIENT
Start: 2020-09-09 | End: 2020-09-09 | Stop reason: HOSPADM

## 2020-09-09 RX ORDER — MIDAZOLAM HYDROCHLORIDE 1 MG/ML
1 INJECTION, SOLUTION INTRAMUSCULAR; INTRAVENOUS AS NEEDED
Status: DISCONTINUED | OUTPATIENT
Start: 2020-09-09 | End: 2020-09-09 | Stop reason: HOSPADM

## 2020-09-09 RX ORDER — MORPHINE SULFATE 10 MG/ML
2 INJECTION, SOLUTION INTRAMUSCULAR; INTRAVENOUS
Status: DISCONTINUED | OUTPATIENT
Start: 2020-09-09 | End: 2020-09-09 | Stop reason: HOSPADM

## 2020-09-09 RX ORDER — DEXAMETHASONE SODIUM PHOSPHATE 4 MG/ML
INJECTION, SOLUTION INTRA-ARTICULAR; INTRALESIONAL; INTRAMUSCULAR; INTRAVENOUS; SOFT TISSUE AS NEEDED
Status: DISCONTINUED | OUTPATIENT
Start: 2020-09-09 | End: 2020-09-09 | Stop reason: HOSPADM

## 2020-09-09 RX ORDER — ATROPINE SULFATE 0.1 MG/ML
0.5 INJECTION INTRAVENOUS
Status: DISCONTINUED | OUTPATIENT
Start: 2020-09-09 | End: 2020-09-09 | Stop reason: HOSPADM

## 2020-09-09 RX ORDER — SODIUM CHLORIDE 9 MG/ML
INJECTION, SOLUTION INTRAVENOUS
Status: DISCONTINUED | OUTPATIENT
Start: 2020-09-09 | End: 2020-09-09 | Stop reason: HOSPADM

## 2020-09-09 RX ORDER — ROPIVACAINE HYDROCHLORIDE 5 MG/ML
30 INJECTION, SOLUTION EPIDURAL; INFILTRATION; PERINEURAL ONCE
Status: DISCONTINUED | OUTPATIENT
Start: 2020-09-09 | End: 2020-09-09 | Stop reason: HOSPADM

## 2020-09-09 RX ORDER — FENTANYL CITRATE 50 UG/ML
50 INJECTION, SOLUTION INTRAMUSCULAR; INTRAVENOUS AS NEEDED
Status: DISCONTINUED | OUTPATIENT
Start: 2020-09-09 | End: 2020-09-09 | Stop reason: HOSPADM

## 2020-09-09 RX ORDER — SODIUM CHLORIDE 9 MG/ML
25 INJECTION, SOLUTION INTRAVENOUS CONTINUOUS
Status: DISCONTINUED | OUTPATIENT
Start: 2020-09-09 | End: 2020-09-09 | Stop reason: HOSPADM

## 2020-09-09 RX ORDER — FENTANYL CITRATE 50 UG/ML
25 INJECTION, SOLUTION INTRAMUSCULAR; INTRAVENOUS
Status: DISCONTINUED | OUTPATIENT
Start: 2020-09-09 | End: 2020-09-09 | Stop reason: HOSPADM

## 2020-09-09 RX ORDER — SODIUM CHLORIDE, SODIUM LACTATE, POTASSIUM CHLORIDE, CALCIUM CHLORIDE 600; 310; 30; 20 MG/100ML; MG/100ML; MG/100ML; MG/100ML
INJECTION, SOLUTION INTRAVENOUS
Status: DISCONTINUED | OUTPATIENT
Start: 2020-09-09 | End: 2020-09-09 | Stop reason: HOSPADM

## 2020-09-09 RX ORDER — MIDAZOLAM HYDROCHLORIDE 1 MG/ML
INJECTION, SOLUTION INTRAMUSCULAR; INTRAVENOUS AS NEEDED
Status: DISCONTINUED | OUTPATIENT
Start: 2020-09-09 | End: 2020-09-09 | Stop reason: HOSPADM

## 2020-09-09 RX ORDER — NALOXONE HYDROCHLORIDE 0.4 MG/ML
0.4 INJECTION, SOLUTION INTRAMUSCULAR; INTRAVENOUS; SUBCUTANEOUS
Status: DISCONTINUED | OUTPATIENT
Start: 2020-09-09 | End: 2020-09-09 | Stop reason: HOSPADM

## 2020-09-09 RX ORDER — ALBUMIN HUMAN 50 G/1000ML
SOLUTION INTRAVENOUS AS NEEDED
Status: DISCONTINUED | OUTPATIENT
Start: 2020-09-09 | End: 2020-09-09 | Stop reason: HOSPADM

## 2020-09-09 RX ORDER — BUPIVACAINE HYDROCHLORIDE 2.5 MG/ML
6 INJECTION, SOLUTION EPIDURAL; INFILTRATION; INTRACAUDAL ONCE
Status: DISCONTINUED | OUTPATIENT
Start: 2020-09-09 | End: 2020-09-09

## 2020-09-09 RX ORDER — TRIAMCINOLONE ACETONIDE 40 MG/ML
80 INJECTION, SUSPENSION INTRA-ARTICULAR; INTRAMUSCULAR ONCE
Status: COMPLETED | OUTPATIENT
Start: 2020-09-09 | End: 2020-09-09

## 2020-09-09 RX ORDER — DIPHENHYDRAMINE HYDROCHLORIDE 50 MG/ML
12.5 INJECTION, SOLUTION INTRAMUSCULAR; INTRAVENOUS AS NEEDED
Status: DISCONTINUED | OUTPATIENT
Start: 2020-09-09 | End: 2020-09-09 | Stop reason: HOSPADM

## 2020-09-09 RX ORDER — METRONIDAZOLE 500 MG/100ML
500 INJECTION, SOLUTION INTRAVENOUS
Status: COMPLETED | OUTPATIENT
Start: 2020-09-09 | End: 2020-09-09

## 2020-09-09 RX ADMIN — Medication 3 MG: at 17:00

## 2020-09-09 RX ADMIN — HYDROMORPHONE HYDROCHLORIDE 4 MG: 2 TABLET ORAL at 01:09

## 2020-09-09 RX ADMIN — PROPOFOL 50 MG: 10 INJECTION, EMULSION INTRAVENOUS at 07:56

## 2020-09-09 RX ADMIN — FENTANYL CITRATE 50 MCG: 50 INJECTION, SOLUTION INTRAMUSCULAR; INTRAVENOUS at 13:58

## 2020-09-09 RX ADMIN — SODIUM CHLORIDE, SODIUM LACTATE, POTASSIUM CHLORIDE, AND CALCIUM CHLORIDE: 600; 310; 30; 20 INJECTION, SOLUTION INTRAVENOUS at 17:05

## 2020-09-09 RX ADMIN — SIMETHICONE 80 MG: 80 TABLET, CHEWABLE ORAL at 22:06

## 2020-09-09 RX ADMIN — KETAMINE HYDROCHLORIDE 30 MG: 10 INJECTION, SOLUTION INTRAMUSCULAR; INTRAVENOUS at 14:08

## 2020-09-09 RX ADMIN — ACETAMINOPHEN 650 MG: 325 TABLET ORAL at 12:19

## 2020-09-09 RX ADMIN — FENTANYL CITRATE 25 MCG: 50 INJECTION, SOLUTION INTRAMUSCULAR; INTRAVENOUS at 19:10

## 2020-09-09 RX ADMIN — Medication 10 ML: at 07:15

## 2020-09-09 RX ADMIN — PROPOFOL 50 MG: 10 INJECTION, EMULSION INTRAVENOUS at 07:55

## 2020-09-09 RX ADMIN — KETAMINE HYDROCHLORIDE 20 MG: 10 INJECTION, SOLUTION INTRAMUSCULAR; INTRAVENOUS at 15:08

## 2020-09-09 RX ADMIN — FENTANYL CITRATE 25 MCG: 50 INJECTION, SOLUTION INTRAMUSCULAR; INTRAVENOUS at 18:15

## 2020-09-09 RX ADMIN — SODIUM CHLORIDE, POTASSIUM CHLORIDE, SODIUM LACTATE AND CALCIUM CHLORIDE: 600; 310; 30; 20 INJECTION, SOLUTION INTRAVENOUS at 14:19

## 2020-09-09 RX ADMIN — LIDOCAINE HYDROCHLORIDE 60 MG: 20 INJECTION, SOLUTION EPIDURAL; INFILTRATION; INTRACAUDAL; PERINEURAL at 13:58

## 2020-09-09 RX ADMIN — Medication 10 ML: at 10:24

## 2020-09-09 RX ADMIN — ROCURONIUM BROMIDE 30 MG: 10 SOLUTION INTRAVENOUS at 13:58

## 2020-09-09 RX ADMIN — PROPOFOL 50 MG: 10 INJECTION, EMULSION INTRAVENOUS at 07:58

## 2020-09-09 RX ADMIN — ONDANSETRON 4 MG: 2 INJECTION INTRAMUSCULAR; INTRAVENOUS at 20:42

## 2020-09-09 RX ADMIN — METRONIDAZOLE 500 MG: 500 SOLUTION INTRAVENOUS at 14:04

## 2020-09-09 RX ADMIN — DEXAMETHASONE SODIUM PHOSPHATE 4 MG: 4 INJECTION, SOLUTION INTRAMUSCULAR; INTRAVENOUS at 13:58

## 2020-09-09 RX ADMIN — ROCURONIUM BROMIDE 20 MG: 10 SOLUTION INTRAVENOUS at 14:25

## 2020-09-09 RX ADMIN — FENTANYL CITRATE 50 MCG: 50 INJECTION, SOLUTION INTRAMUSCULAR; INTRAVENOUS at 17:30

## 2020-09-09 RX ADMIN — Medication 10 ML: at 22:07

## 2020-09-09 RX ADMIN — HYDROMORPHONE HYDROCHLORIDE 2 MG: 1 INJECTION, SOLUTION INTRAMUSCULAR; INTRAVENOUS; SUBCUTANEOUS at 20:42

## 2020-09-09 RX ADMIN — ONDANSETRON 4 MG: 2 INJECTION INTRAMUSCULAR; INTRAVENOUS at 18:15

## 2020-09-09 RX ADMIN — DEXMEDETOMIDINE HYDROCHLORIDE 8 MCG: 100 INJECTION, SOLUTION, CONCENTRATE INTRAVENOUS at 17:33

## 2020-09-09 RX ADMIN — ONDANSETRON HYDROCHLORIDE 4 MG: 2 INJECTION, SOLUTION INTRAMUSCULAR; INTRAVENOUS at 17:13

## 2020-09-09 RX ADMIN — PROPOFOL 150 MG: 10 INJECTION, EMULSION INTRAVENOUS at 13:58

## 2020-09-09 RX ADMIN — HYDROMORPHONE HYDROCHLORIDE 2 MG: 1 INJECTION, SOLUTION INTRAMUSCULAR; INTRAVENOUS; SUBCUTANEOUS at 10:24

## 2020-09-09 RX ADMIN — FENTANYL CITRATE 50 MCG: 50 INJECTION, SOLUTION INTRAMUSCULAR; INTRAVENOUS at 17:16

## 2020-09-09 RX ADMIN — DEXMEDETOMIDINE HYDROCHLORIDE 12 MCG: 100 INJECTION, SOLUTION, CONCENTRATE INTRAVENOUS at 17:32

## 2020-09-09 RX ADMIN — PROPOFOL 50 MG: 10 INJECTION, EMULSION INTRAVENOUS at 07:54

## 2020-09-09 RX ADMIN — PROPOFOL 25 MG: 10 INJECTION, EMULSION INTRAVENOUS at 08:00

## 2020-09-09 RX ADMIN — SODIUM CHLORIDE, SODIUM LACTATE, POTASSIUM CHLORIDE, AND CALCIUM CHLORIDE 125 ML/HR: 600; 310; 30; 20 INJECTION, SOLUTION INTRAVENOUS at 11:59

## 2020-09-09 RX ADMIN — HYDROMORPHONE HYDROCHLORIDE 1 MG: 2 INJECTION, SOLUTION INTRAMUSCULAR; INTRAVENOUS; SUBCUTANEOUS at 14:34

## 2020-09-09 RX ADMIN — SODIUM CHLORIDE: 900 INJECTION, SOLUTION INTRAVENOUS at 07:39

## 2020-09-09 RX ADMIN — ALBUMIN (HUMAN) 250 ML: 12.5 INJECTION, SOLUTION INTRAVENOUS at 17:05

## 2020-09-09 RX ADMIN — PROPOFOL 50 MG: 10 INJECTION, EMULSION INTRAVENOUS at 14:13

## 2020-09-09 RX ADMIN — HYDROMORPHONE HYDROCHLORIDE 1 MG: 2 INJECTION, SOLUTION INTRAMUSCULAR; INTRAVENOUS; SUBCUTANEOUS at 14:45

## 2020-09-09 RX ADMIN — GLYCOPYRROLATE 0.4 MG: 0.2 INJECTION, SOLUTION INTRAMUSCULAR; INTRAVENOUS at 17:00

## 2020-09-09 RX ADMIN — ROCURONIUM BROMIDE 10 MG: 10 SOLUTION INTRAVENOUS at 16:29

## 2020-09-09 RX ADMIN — ROCURONIUM BROMIDE 20 MG: 10 SOLUTION INTRAVENOUS at 15:23

## 2020-09-09 RX ADMIN — MIDAZOLAM 2 MG: 1 INJECTION INTRAMUSCULAR; INTRAVENOUS at 13:47

## 2020-09-09 RX ADMIN — ONDANSETRON 4 MG: 2 INJECTION INTRAMUSCULAR; INTRAVENOUS at 10:29

## 2020-09-09 RX ADMIN — GENTAMICIN SULFATE 340 MG: 40 INJECTION, SOLUTION INTRAMUSCULAR; INTRAVENOUS at 12:25

## 2020-09-09 RX ADMIN — FENTANYL CITRATE 50 MCG: 50 INJECTION, SOLUTION INTRAMUSCULAR; INTRAVENOUS at 17:29

## 2020-09-09 RX ADMIN — LIDOCAINE HYDROCHLORIDE 60 MG: 20 INJECTION, SOLUTION EPIDURAL; INFILTRATION; INTRACAUDAL; PERINEURAL at 07:54

## 2020-09-09 NOTE — PERIOP NOTES
TRANSFER - OUT REPORT:    Verbal report given to ronal MCALLISTER(name) on [facility].  being transferred to [facility](unit) for routine progression of care       Report consisted of patients Situation, Background, Assessment and   Recommendations(SBAR). Information from the following report(s) Procedure Summary was reviewed with the receiving nurse. Lines:   Peripheral IV 09/08/20 Anterior;Proximal;Right Forearm (Active)   Site Assessment Clean, dry, & intact 09/08/20 1730   Phlebitis Assessment 0 09/08/20 1730   Infiltration Assessment 0 09/08/20 1730   Dressing Status Clean, dry, & intact 09/08/20 1730   Dressing Type Transparent 09/08/20 1730   Hub Color/Line Status Blue;Capped 09/08/20 1730   Action Taken Open ports on tubing capped 09/08/20 1730   Alcohol Cap Used Yes 09/08/20 1730        Opportunity for questions and clarification was provided.       Patient transported with:   [employer]

## 2020-09-09 NOTE — ROUTINE PROCESS
Bedside shift change report given to [facility]   RN(oncoming nurse) by Chana aBker RN (offgoing nurse). Report included the following information SBAR, MAR and Recent Results.

## 2020-09-09 NOTE — ROUTINE PROCESS
[facility] [place]. 
1955 
[id number] Situation: 
Verbal report received from: [facility] Procedure: Procedure(s): ENDOSCOPIC ULTRASOUND (EUS) with Celiac Plexus Block Background: 
 
Preoperative diagnosis: Abdominal Pain Postoperative diagnosis: 1.abdominal pain / plexus :  Dr. Kranthi Elias 
Assistant(s): Endoscopy Technician-1: Jordy Dhillon 
Endoscopy RN-1: Donal Chowdhury Specimens: no 
H. Pylori  no Assessment: 
Intra-procedure medications Anesthesia gave intra-procedure sedation and medications, see anesthesia flow sheet Intravenous fluids: NS@ Marley Lara Vital signs stable Abdominal assessment: round and soft Recommendation: 
Return to floor -room 219 Family -yes Permission to share finding with family

## 2020-09-09 NOTE — PROCEDURES
[address]  [address] [facility]  [phone]    Endoscopic Ultrasound with Celiac Plexus Neurolysis    Indications:    Upper abdominal pain, cholangiocarcinoma     :  Claude Friedman MD    Staff: Endoscopy Technician-1: Karin Herrera  Endoscopy RN-1: Grayson De La Vega     Referring Provider: Samantha Sparrow DO    Procedure Type: Endoscopic Ultrasound wit celiac plexus neurolysis     Indications:  Upper abdominal pain, cholangiocarcinoma    Pre-operative Diagnosis: see indication above     Post-operative Diagnosis: See findings below     Anethesia/Sedation: MAC anesthesia     Procedure Details   After infom consent was obtained for the procedure, with all risks and benefits of procedure explained the patient was taken to the endoscopy suite and placed in the left lateral decubitus position. Following sequential administration of sedation as per above, the linear echoendoscope was inserted into the mouth and advanced under direct vision to second portion of the duodenum. A careful inspection was made as the gastroscope was withdrawn, including a retroflexed view of the proximal stomach; findings and interventions are described below. Findings:     Endoscopic:   Esophagus:normal   Stomach: pylorus preserving Whipple anatomy noted   Duodenum: not untubated     Ultrasound:   Esophagus: normal findings   Stomach: normal findings     Pancreas:   Areas examined: the entire gland   Parenchyma: -normal   Pancreatic Duct: normal findings     Liver:   Parenchyma: normal   Bile Duct: the entire biliary tree, normal      Celiac plexus neurolysis was performed under US and doppler guidance around 2 cm away from the take off of celiac artery from aorta, I performed aspiration with negative blood return before each injection.  I first injected 3 cc of saline then 10 cc of 25% bupivacaine then 20 cc of 98% alcohol/Kenalog 40mg then 3 cc of saline Specimen Removed: None   Complications: None. EBL: None.    Interventions: see above     Recommendations:   -Monitor symptoms  -Surgery planned today

## 2020-09-09 NOTE — ANESTHESIA POSTPROCEDURE EVALUATION
Post-Anesthesia Evaluation and Assessment    Patient: Bob Honeycutt. MRN: [id number]  SSN: [id number]    YOB: 1955  Age: 62 y.o. Sex: male      I have evaluated the patient and they are stable and ready for discharge from the PACU. Cardiovascular Function/Vital Signs  Visit Vitals  /68   Pulse 81   Temp 36.7 °C (98 °F)   Resp 17   Ht 5' 8\" (1.727 m)   Wt 65.8 kg (145 lb)   SpO2 96%   BMI 22.05 kg/m²       Patient is status post General anesthesia for Procedure(s):  LAPAROSCOPIC REVISION OF GASTROJEJUNOSTOMY TO LOLI-EN Y (URGENT). Nausea/Vomiting: None    Postoperative hydration reviewed and adequate. Pain:  Pain Scale 1: Numeric (0 - 10) (09/09/20 1131)  Pain Intensity 1: 0 (09/09/20 1131)   Managed    Neurological Status: At baseline    Mental Status, Level of Consciousness: Alert and  oriented to person, place, and time    Pulmonary Status:   O2 Device: Nasal cannula (09/09/20 1731)   Adequate oxygenation and airway patent    Complications related to anesthesia: None    Post-anesthesia assessment completed. No concerns    Signed By: Breonna Morales MD     September 9, 2020              Procedure(s):  LAPAROSCOPIC REVISION OF GASTROJEJUNOSTOMY TO LOLI-EN Y (URGENT). general    <BSHSIANPOST>    INITIAL Post-op Vital signs:   Vitals Value Taken Time   /58 9/9/2020  5:45 PM   Temp 36.7 °C (98 °F) 9/9/2020  5:31 PM   Pulse 66 9/9/2020  5:46 PM   Resp 14 9/9/2020  5:46 PM   SpO2 91 % 9/9/2020  5:46 PM   Vitals shown include unvalidated device data.

## 2020-09-09 NOTE — BRIEF OP NOTE
Brief Postoperative Note    Patient: Nevaeh Moya. YOB: 1955  MRN: [id number]    Date of Procedure: 9/9/2020     Pre-Op Diagnosis: BILE REFLUX, RUCURRENT CHOLANGIOCARINOMA WITH HISTORY OF PYLORIC SPARING WHIPPLE PROCEDURE    Post-Op Diagnosis: Same as preoperative diagnosis. Procedure(s):  LAPAROSCOPIC REVISION OF DUODENOJEJUNOSTOMY TO EREN-EN Y DUODENOJEJUNOSTOMY    Surgeon(s): Jeancarlos Dominique MD    Surgical Assistant: Marlon Mo    Anesthesia: General     Estimated Blood Loss (mL): 50 mL    Complications: None    Specimens: * No specimens in log *     Implants: * No implants in log *    Drains: * No LDAs found *    Findings: Pyloric sparing whipple anatomy converted to eren en Y duodenojejunostomy with 60 cm alimentary limb to isoperistaltic jejunojejunostomy.       Electronically Signed by Baldev Jose MD on 9/9/2020 at 5:28 PM

## 2020-09-09 NOTE — PROGRESS NOTES
Palliative Medicine Consult  Jesus: [phone] ([phone])    Patient Name: Watson Sterling. YOB: 1955    Date of Initial Consult: 9/4/2020  Reason for Consult: Pain management  Requesting Provider: Dr. Gann Cheri  Primary Care Physician: Robert Pena DO     SUMMARY:   Tim Babin is a 62y.o. 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 His chronic back pain has been controlled with fentanyl patch 37.5 mcg every 72 hours. 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 This is second marriage for both of them. He is now admitted with intractable bilateral flank pain, biliary emesis and nausea. MRI shows renal vein thrombosis     Current hospitalization-plan for celiac plexus block. Reversal of Whipple procedure planned for next Wednesday-9/9/2020       PALLIATIVE DIAGNOSES:   1. Bilateral flank pain   2. chronic low back pain- on fentanyl 37.5 mcg patch plus tramadol 3 tablets/day  3. Chronic nausea and vomiting       PLAN:   1. New bilateral flank pain-  MRI shows renal vein thrombosis. He is currently on IV Dilaudid with moderate relief. Received 3 doses of IV Dilaudid 2 mg.   - Given patient is planned for reversal of Whipple tomorrow, restart p.o. Dilaudid to his not only dependent on IV opioids for pain medication.   -Start Dilaudid p.o. 2 to 4 mg every 4 hours as needed. - not taking, using IV more   -Chronic back pain-patient has been on fentanyl 37.5 mcg patch every 72 hours with good relief. His patch was removed on Tuesday. This needs to be restarted in order to help with his back pain and preferably decrease his need for IV pain medication. Given that we do not have 37.5 mcg patch in our formulary, I am placing an order for 25 mcg +12 mcg patch.    -He is getting a celiac plexus block    2. Constipation-he tends to struggle with diarrhea most of the time and then severe constipation. Important to prevent constipation with increasing opioids and flank pain. Please start Zeny-Colace 2 tablets daily and MiraLAX as needed. Okay to hold if patient is having loose stools or diarrhea. 3.  Intractable nausea- On IV Zofran with good relief. 4. Patient will remain in hospital for the next several days, plan for reversal of Whipple next Wednesday. Our team will follow to help with pain control. Please call palliative medicine at [phone] should patient have uncontrolled pain. 5. iInitial consult note routed to primary continuity provider and/or primary health care team members  6.  Communicated plan of care with: Palliative [facility] Team     GOALS OF CARE / TREATMENT PREFERENCES:     GOALS OF CARE:  Patient/Health Care Proxy Stated Goals: Cure    TREATMENT PREFERENCES:   Code Status: Full Code    Advance Care Planning:  [x] [facility] Interdisciplinary Team has updated the ACP Navigator with Health Care Decision Maker and Patient Capacity      Primary Decision MakerGilford Lax - [phone]    Secondary Decision Maker: Ridge Cristina III - Child - [phone]    Supplemental (Other) Decision Maker: Serenity Plummer Child - [phone]  Advance Care Planning 8/26/2020   Patient's Healthcare Decision Maker is: Named in scanned ACP document   Primary Decision Maker Name -   Primary Decision Maker Phone Number -   Primary Decision Maker Relationship to Patient -   Confirm Advance Directive Yes, on file   Patient Would Like to Complete Advance Directive -   Does the patient have other document types -       Medical Interventions: Full interventions     Other Instructions:   Artificially Administered Nutrition: No feeding tube     Other:    As far as possible, the palliative care team has discussed with patient / health care proxy about goals of care / treatment preferences for patient. HISTORY:     History obtained from: Patient    CHIEF COMPLAINT: Flank pain    HPI/SUBJECTIVE:    The patient is:   [x] Verbal and participatory  [] Non-participatory due to:     9/9- feeling ok, still needing IV dilaudid more than PO for acute pain control. Anxious about surgery tomorrow but wants to proceed. Patient well-known to me, seen at bedside along with his wife. He talked about the days leading to current admission. He  has been struggling with increasing back pain and upper abdominal pain with increasing vomiting over the last 2 weeks but 2 days prior to admission, he developed severe bilateral flank pain which is new for him, difficulty urination, vomiting and severe nausea. We discussed imaging results so far, he is happy that he gets dizzy in the hospital until the Whipple procedure scheduled.     Clinical Pain Assessment (nonverbal scale for severity on nonverbal patients):   Clinical Pain Assessment  Severity: 6  Location: Bilateral flank and upper abdomen and back  Character: Aching, cramping  Duration: Days  Effect: Functional and emotional  Factors: None in particular  Frequency: Constant          Duration: for how long has pt been experiencing pain (e.g., 2 days, 1 month, years)  Frequency: how often pain is an issue (e.g., several times per day, once every few days, constant)     FUNCTIONAL ASSESSMENT:     Palliative Performance Scale (PPS):  PPS: 70       PSYCHOSOCIAL/SPIRITUAL SCREENING:     Palliative IDT has assessed this patient for cultural preferences / practices and a referral made as appropriate to needs (Cultural Services, Patient Advocacy, Ethics, etc.)    Any spiritual / Druze concerns:  [] Yes /  [x] No    Caregiver Burnout:  [] Yes /  [x] No /  [] No Caregiver Present      Anticipatory grief assessment:   [x] Normal  / [] Maladaptive       ESAS Anxiety: Anxiety: 0    ESAS Depression: Depression: 0        REVIEW OF SYSTEMS:     Positive and pertinent negative findings in ROS are noted above in HPI. The following systems were [x] reviewed / [] unable to be reviewed as noted in HPI  Other findings are noted below. Systems: constitutional, ears/nose/mouth/throat, respiratory, gastrointestinal, genitourinary, musculoskeletal, integumentary, neurologic, psychiatric, endocrine. Positive findings noted below. Modified ESAS Completed by: provider   Fatigue: 3 Drowsiness: 0   Depression: 0 Pain: 6   Anxiety: 0 Nausea: 4   Anorexia: 0 Dyspnea: 0     Constipation: No     Stool Occurrence(s): 1        PHYSICAL EXAM:     From RN flowsheet:  Wt Readings from Last 3 Encounters:   09/09/20 145 lb (65.8 kg)   09/03/20 145 lb (65.8 kg)   07/06/20 150 lb (68 kg)     Blood pressure 112/66, pulse 65, temperature 97.7 °F (36.5 °C), resp. rate 17, height 5' 8\" (1.727 m), weight 145 lb (65.8 kg), SpO2 96 %.     Pain Scale 1: Numeric (0 - 10)  Pain Intensity 1: 0  Pain Onset 1: chronic  Pain Location 1: Abdomen  Pain Orientation 1: Upper, Right  Pain Description 1: Sharp  Pain Intervention(s) 1: Medication (see MAR)  Last bowel movement, if known:     Constitutional: Alert, oriented  Eyes: pupils equal, anicteric  ENMT: no nasal discharge, moist mucous membranes  Cardiovascular: regular rhythm, distal pulses intact  Respiratory: breathing not labored, symmetric  Gastrointestinal: soft,+bowel sounds, tenderness with palpation of lower abdomen  Musculoskeletal: no deformity, no tenderness to palpation  Skin: warm, dry  Neurologic: following commands, moving all extremities  Psychiatric: full affect, no hallucinations  Other:       HISTORY:     Active Problems:    Abdominal pain (12/31/2019)      Nausea & vomiting (9/2/2020)      Past Medical History:   Diagnosis Date    Arrhythmia     Previous a.fib, ablation, NSR now; NO LONGER FOLLOWED BY CARDIOLOGIST.  Autoimmune disease ([facility] [address] [address]     SJOGREN'S    Cancer ([facility] [address]     COMMON BILE DUCT, ADENOCARCINOMA    Diabetes ([facility] [address]     Family history of skin cancer     Hypertension     Sepsis ([facility] [address] 2017    STENTING TO BILE DUCT    Status post chemotherapy     Stroke [facility]) 2008    brain aneurysm - no deficits    Sun-damaged skin     Sunburn, blistering       Past Surgical History:   Procedure Laterality Date    HX GI      COLONOSCOPY, POLYPS (BENIGN)    HX GI  10/06/2017    [facility]     [address]  2005    Ablation     HX ORTHOPAEDIC  2000    Spinal fusion W/ HARDWARE    HX OTHER SURGICAL  11/07/2017    [place] Cath, Dr. Bulmaro Lester  2017    PORTACATH - then removed    NEUROLOGICAL PROCEDURE UNLISTED  2005    Otelia Ney hole washout from cerebral hemorrhage      Family History   Problem Relation Age of Onset    Cancer Father         Breast and Colon    Cancer Mother         LEUKEMIA    No Known Problems Sister     No Known Problems Brother     No Known Problems Sister     Anesth Problems Neg Hx       History reviewed, no pertinent family history.   Social History     Tobacco Use    Smoking status: Never Smoker    Smokeless tobacco: Never Used   Substance Use Topics    Alcohol use: Never     Frequency: Never     Comment: very rare     Allergies   Allergen Reactions    Shellfish Derived Anaphylaxis     Soft shell crabs    Morphine Nausea and Vomiting    Pcn [Penicillins] Other (comments)     Pt stated \"always been told PCN\"  Pt tolerated other cephalosporins in the past      Current Facility-Administered Medications   Medication Dose Route Frequency    0.9% sodium chloride infusion  50 mL/hr IntraVENous CONTINUOUS    sodium chloride (NS) flush 5-40 mL  5-40 mL IntraVENous PRN    ethyl alcohol 98 % injection 20 mL  20 mL Other ONCE    bupivacaine (PF) (MARCAINE) 0.25 % (2.5 mg/mL) injection 15 mg  6 mL Sympathetic Nerve Block ONCE    ethyl alcohol 98 % injection    PRN    bupivacaine (PF) 0.25% (ON-Q BAG) 400 ml local infiltration    PRN    pantoprazole (PROTONIX) tablet 40 mg  40 mg Oral ACB    acetaminophen (TYLENOL) tablet 650 mg  650 mg Oral Q4H PRN    prochlorperazine (COMPAZINE) with saline injection 5 mg  5 mg IntraVENous Q4H PRN    loperamide (IMODIUM) capsule 4 mg  4 mg Oral Q4H PRN    fentaNYL (DURAGESIC) 12 mcg/hr patch 1 Patch  1 Patch TransDERmal Q72H    fentaNYL (DURAGESIC) 25 mcg/hr patch 1 Patch  1 Patch TransDERmal Q72H    HYDROmorphone (DILAUDID) tablet 2-4 mg  2-4 mg Oral Q4H PRN    senna-docusate (PERICOLACE) 8.6-50 mg per tablet 2 Tab  2 Tab Oral DAILY    polyethylene glycol (MIRALAX) packet 17 g  17 g Oral DAILY    heparin (porcine) injection 5,000 Units  5,000 Units SubCUTAneous Q12H    HYDROmorphone (PF) (DILAUDID) injection 2 mg  2 mg IntraVENous Q3H PRN    sodium chloride (NS) flush 5-40 mL  5-40 mL IntraVENous Q8H    sodium chloride (NS) flush 5-40 mL  5-40 mL IntraVENous PRN    ondansetron (ZOFRAN) injection 4 mg  4 mg IntraVENous Q4H PRN    dutasteride (AVODART) capsule 0.5 mg  0.5 mg Oral DAILY    finasteride (PROSCAR) tablet 5 mg  5 mg Oral DAILY    lipase-protease-amylase (CREON 12,000) capsule 6 Cap  6 Cap Oral TID WITH MEALS    tamsulosin (FLOMAX) capsule 0.4 mg  0.4 mg Oral DAILY          LAB AND IMAGING FINDINGS:     Lab Results   Component Value Date/Time    WBC 5.6 09/09/2020 02:15 AM    HGB 11.2 (L) 09/09/2020 02:15 AM    PLATELET 665 (L) 93/81/8421 02:15 AM     Lab Results   Component Value Date/Time    Sodium 140 09/09/2020 02:15 AM    Potassium 3.8 09/09/2020 02:15 AM    Chloride 107 09/09/2020 02:15 AM    CO2 29 09/09/2020 02:15 AM    BUN 10 09/09/2020 02:15 AM    Creatinine 0.57 (L) 09/09/2020 02:15 AM Calcium 8.7 09/09/2020 02:15 AM    Magnesium 2.4 12/31/2019 07:37 AM    Phosphorus 4.0 08/18/2018 04:20 AM      Lab Results   Component Value Date/Time    Alk. phosphatase 250 (H) 09/09/2020 02:15 AM    Protein, total 5.3 (L) 09/09/2020 02:15 AM    Albumin 2.6 (L) 09/09/2020 02:15 AM    Globulin 2.7 09/09/2020 02:15 AM     Lab Results   Component Value Date/Time    INR 1.1 09/09/2020 02:15 AM    Prothrombin time 11.5 (H) 09/09/2020 02:15 AM    aPTT 29.9 09/09/2020 02:15 AM      Lab Results   Component Value Date/Time    Iron 31 (L) 01/02/2020 03:24 AM    TIBC 245 (L) 01/02/2020 03:24 AM    Iron % saturation 13 (L) 01/02/2020 03:24 AM    Ferritin 122 01/02/2020 03:24 AM      No results found for: PH, PCO2, PO2  No components found for: GLPOC   No results found for: CPK, CKMB             Total time:   Counseling / coordination time, spent as noted above:   > 50% counseling / coordination?:     Prolonged service was provided for  []30 min   []75 min in face to face time in the presence of the patient, spent as noted above. Time Start:   Time End:   Note: this can only be billed with 73637 (initial) or 20307 (follow up). If multiple start / stop times, list each separately.

## 2020-09-09 NOTE — ANESTHESIA POSTPROCEDURE EVALUATION
Post-Anesthesia Evaluation and Assessment    Patient: Nallely Barnes MRN: [id number]  SSN: [id number]    YOB: 1955  Age: 62 y.o. Sex: male      I have evaluated the patient and they are stable and ready for discharge from the PACU. Cardiovascular Function/Vital Signs  Visit Vitals  /66 (BP 1 Location: Right arm, BP Patient Position: At rest)   Pulse 65   Temp 36.5 °C (97.7 °F)   Resp 17   Ht 5' 8\" (1.727 m)   Wt 65.8 kg (145 lb)   SpO2 96%   BMI 22.05 kg/m²       Patient is status post MAC anesthesia for Procedure(s):  ENDOSCOPIC ULTRASOUND (EUS) with Celiac Plexus Block  INJECTION. Nausea/Vomiting: None    Postoperative hydration reviewed and adequate. Pain:  Pain Scale 1: Numeric (0 - 10) (09/09/20 0839)  Pain Intensity 1: 0 (09/09/20 0839)   Managed    Neurological Status: At baseline    Mental Status, Level of Consciousness: Alert and  oriented to person, place, and time    Pulmonary Status:   O2 Device: Room air (09/09/20 0900)   Adequate oxygenation and airway patent    Complications related to anesthesia: None    Post-anesthesia assessment completed. No concerns    Signed By: Analy Hubbard MD     September 9, 2020              Procedure(s):  ENDOSCOPIC ULTRASOUND (EUS) with Celiac Plexus Block  INJECTION. MAC    <BSHSIANPOST>    INITIAL Post-op Vital signs:   Vitals Value Taken Time   /66 9/9/2020  9:00 AM   Temp 36.5 °C (97.7 °F) 9/9/2020  9:00 AM   Pulse 76 9/9/2020  9:01 AM   Resp 12 9/9/2020  9:01 AM   SpO2 98 % 9/9/2020  9:01 AM   Vitals shown include unvalidated device data.

## 2020-09-09 NOTE — PERIOP NOTES
Family updated regarding start of surgery. Rafita Syed (wife) [phone]    Farshad Salinas RN    [id number]: Family updated regarding progress of surgery.

## 2020-09-09 NOTE — PROGRESS NOTES
Assessment/Plan:      Mild left hydronephrosis, resolved  Left renal vein occlusion  Moderate consequent, left retroperitoneal edema  Abdominal pain, Gen. Surg. Planning for George-en-Y 9/9/2020.     -No  procedure required @ this time. Repeat imaging, US, showing resolved left hydronephrosis.  -Labs and VSS stable. Continue to monitor.  -Urology signing off, please re consult if services are needed.

## 2020-09-09 NOTE — ANESTHESIA PREPROCEDURE EVALUATION
Relevant Problems   No relevant active problems       Anesthetic History   No history of anesthetic complications            Review of Systems / Medical History  Patient summary reviewed, nursing notes reviewed and pertinent labs reviewed    Pulmonary  Within defined limits                 Neuro/Psych       CVA ( from cerebral aneurysm): no residual symptoms       Cardiovascular    Hypertension        Dysrhythmias ( s/p ablation ) : atrial fibrillation      Exercise tolerance: >4 METS     GI/Hepatic/Renal     GERD           Endo/Other    Diabetes    Cancer     Other Findings            Physical Exam    Airway  Mallampati: I  TM Distance: 4 - 6 cm  Neck ROM: normal range of motion   Mouth opening: Normal     Cardiovascular  Regular rate and rhythm,  S1 and S2 normal,  no murmur, click, rub, or gallop             Dental  No notable dental hx       Pulmonary  Breath sounds clear to auscultation               Abdominal  GI exam deferred       Other Findings            Anesthetic Plan    ASA: 3  Anesthesia type: general          Induction: Intravenous  Anesthetic plan and risks discussed with: Patient

## 2020-09-09 NOTE — PROGRESS NOTES
[facility]      Subjective     No acute events overnight. Had EUS with celiac plexus block this morning. Had some pain after but none since he received prn dilaudid. NPO for surgery. Objective     Patient Vitals for the past 24 hrs:   Temp Pulse Resp BP SpO2   09/09/20 1109 98.8 °F (37.1 °C) 79 18 98/58 97 %   09/09/20 0900 97.7 °F (36.5 °C) 65 17 112/66 96 %   09/09/20 0839  63 17 113/68 96 %   09/09/20 0829  65 17 102/56 94 %   09/09/20 0824  65 17 104/64 95 %   09/09/20 0819 97.1 °F (36.2 °C) 66 17 97/64 94 %   09/09/20 0814  67 17 101/63 93 %   09/09/20 0810  68 16 101/63 94 %   09/09/20 0805  65 17 102/81 96 %   09/09/20 0737 98.4 °F (36.9 °C) 67 15 107/72 97 %   09/09/20 0552 97.6 °F (36.4 °C) 74 16 93/62 94 %   09/09/20 0233 97.9 °F (36.6 °C) 68 17 104/60 95 %   09/08/20 2314     96 %   09/08/20 2016 98.5 °F (36.9 °C) 79 18 110/68 96 %   09/08/20 1426 98.1 °F (36.7 °C) 82 18 115/70 99 %       Date 09/08/20 0700 - 09/09/20 0659 09/09/20 0700 - 09/10/20 0659   Shift 4109-9816 6279-2761 24 Hour Total 6928-6156 3304-7683 24 Hour Total   INTAKE   P.O. 720  720        P. O. 720  720      I. V.(mL/kg/hr)    100  100     Volume (0.9% sodium chloride infusion)    100  100   Shift Total(mL/kg) 720(10.5)  720(10.9) 100(1.5)  100(1.5)   OUTPUT   Urine(mL/kg/hr)           Urine Occurrence(s) 1 x  1 x      Blood    0  0     Estimated Blood Loss    0  0   Shift Total(mL/kg)    0(0)  0(0)     720 100  100   Weight (kg) 68.4 66 66 65.8 65.8 65.8       PE  GEN - Awake, alert, communicating appropriately. NAD  Pulm - CTAB  CV - RRR  Abd - soft, ND,  NT. Ext - warm, well perfused.       Labs  Recent Results (from the past 24 hour(s))   METABOLIC PANEL, COMPREHENSIVE    Collection Time: 09/09/20  2:15 AM   Result Value Ref Range    Sodium 140 136 - 145 mmol/L    Potassium 3.8 3.5 - 5.1 mmol/L    Chloride 107 97 - 108 mmol/L    CO2 29 21 - 32 mmol/L    Anion gap 4 (L) 5 - 15 mmol/L    Glucose 110 (H) 65 - 100 mg/dL    BUN 10 6 - 20 MG/DL    Creatinine 0.57 (L) 0.70 - 1.30 MG/DL    BUN/Creatinine ratio 18 12 - 20      GFR est AA >60 >60 ml/min/1.73m2    GFR est non-AA >60 >60 ml/min/1.73m2    Calcium 8.7 8.5 - 10.1 MG/DL    Bilirubin, total 0.8 0.2 - 1.0 MG/DL    ALT (SGPT) 59 12 - 78 U/L    AST (SGOT) 25 15 - 37 U/L    Alk. phosphatase 250 (H) 45 - 117 U/L    Protein, total 5.3 (L) 6.4 - 8.2 g/dL    Albumin 2.6 (L) 3.5 - 5.0 g/dL    Globulin 2.7 2.0 - 4.0 g/dL    A-G Ratio 1.0 (L) 1.1 - 2.2     CBC WITH AUTOMATED DIFF    Collection Time: 09/09/20  2:15 AM   Result Value Ref Range    WBC 5.6 4.1 - 11.1 K/uL    RBC 3.58 (L) 4.10 - 5.70 M/uL    HGB 11.2 (L) 12.1 - 17.0 g/dL    HCT 33.7 (L) 36.6 - 50.3 %    MCV 94.1 80.0 - 99.0 FL    MCH 31.3 26.0 - 34.0 PG    MCHC 33.2 30.0 - 36.5 g/dL    RDW 12.5 11.5 - 14.5 %    PLATELET 778 (L) 315 - 400 K/uL    MPV 10.8 8.9 - 12.9 FL    NRBC 0.0 0  WBC    ABSOLUTE NRBC 0.00 0.00 - 0.01 K/uL    NEUTROPHILS 70 32 - 75 %    LYMPHOCYTES 13 12 - 49 %    MONOCYTES 9 5 - 13 %    EOSINOPHILS 7 0 - 7 %    BASOPHILS 0 0 - 1 %    IMMATURE GRANULOCYTES 1 (H) 0.0 - 0.5 %    ABS. NEUTROPHILS 3.9 1.8 - 8.0 K/UL    ABS. LYMPHOCYTES 0.7 (L) 0.8 - 3.5 K/UL    ABS. MONOCYTES 0.5 0.0 - 1.0 K/UL    ABS. EOSINOPHILS 0.4 0.0 - 0.4 K/UL    ABS. BASOPHILS 0.0 0.0 - 0.1 K/UL    ABS. IMM.  GRANS. 0.1 (H) 0.00 - 0.04 K/UL    DF SMEAR SCANNED      RBC COMMENTS NORMOCYTIC, NORMOCHROMIC     PROTHROMBIN TIME + INR    Collection Time: 09/09/20  2:15 AM   Result Value Ref Range    INR 1.1 0.9 - 1.1      Prothrombin time 11.5 (H) 9.0 - 11.1 sec   PTT    Collection Time: 09/09/20  2:15 AM   Result Value Ref Range    aPTT 29.9 22.1 - 32.0 sec    aPTT, therapeutic range     58.0 - 77.0 SECS   GLUCOSE, POC    Collection Time: 09/09/20 11:57 AM   Result Value Ref Range    Glucose (POC) 129 (H) 65 - 100 mg/dL    Performed by [facility]      MRI Results (most recent):  Results from Hospital Encounter encounter on 09/02/20   MRI ABD W MRCP W WO CONT    Narrative MRI ABDOMEN AND MRCP WITH AND WITHOUT CONTRAST. 9/3/2020 11:15 PM    INDICATION: Elevated bilirubin. History of Whipple for cholangiocarcinoma. Stricture of the pancreatic anastomosis. COMPARISON: CT abdomen pelvis 9/3/2020, 9/2/2020, 12/4/2019. TECHNIQUE: Multisequence and multiplanar MRI of the abdomen was performed after  the administration of 7 mL of Gadavist (gadobutrol)  IV contrast. Images were  obtained without contrast; and in late arterial, portal venous, and delayed  phases after the administration of contrast.     Heavily T2-weighted thick slab and thin slice images were obtained in the  oblique coronal plane through the biliary tree (MRCP). FINDINGS:   Recurrence: Abnormal soft tissue centered between the common hepatic artery and  the SMA has increased. On 12/4/2019, the SMA and common hepatic artery where  encased in the celiac artery was abutted. It measured approximately 16 x 44 x 24  mm in greatest dimensions Evaluation at that time was compounded by superimposed  acute pancreatitis, however. On today's examination and on 9/2/2020, it measured  34 x 47 x 58 mm. This spiculated, T1 hypointense mass shows slight enhancement on delayed phases  (13-67, 10-67). It now encases the superior mesenteric artery and narrows it  from the origin over greater than 2 cm. There is poststenotic dilation of the  midportion. The celiac and common hepatic arteries are encased but not narrowed. The bilateral renal arteries are encased and more mildly narrowed. The left  renal vein is obliterated, and the left kidney drains via collaterals to the  left gonadal splenic, and lumbar veins. The aorta is abutted over 180 degrees. This represents recurrent tumor until proven otherwise. Retroperitoneal fibrosis  is a less likely possibility. Abdomen:  There is heterogeneous perfusion throughout the liver on arterial  phase, with no correlate on portal venous phase, and delayed phase, or  T2-weighted images. This is more than typically seen as perfusion anomalies. It  is indeterminate, but of questionable significance. The portal and splenic veins remain dilated. No downstream narrowing. No overt  cirrhosis. No superior mesenteric vein dilation. Moderate left retroperitoneal edema is likely due to renal vein occlusion. A  trace left pleural effusion and trace left paracolic ascites are also  associated. No pancreatic or biliary duct dilation. Incidental note is made of left renal  cysts and lower thoracic nerve root sleeve cysts. The distal esophagus, stomach,  gastrojejunostomy, biliary limb, spleen, adrenals, and kidneys are otherwise  normal. Descending diverticulosis is mild. Post L5-S1 fusion. Impression IMPRESSION:   1. Perivascular recurrence in the superior retroperitoneum extends around the  celiac, common hepatic, superior mesenteric, and bilateral renal arteries. 2. Left renal vein occlusion. Moderate consequent, left retroperitoneal edema,  trace ascites, and trace left pleural effusion. 3. Narrowing of the origin and proximal SMA. Poststenotic dilation of the  midportion. 4. More mild narrowing of the bilateral renal arteries. CT Results (most recent):  Results from Hospital Encounter encounter on 09/02/20   CT ABD PELV WO CONT    Narrative EXAM: CT ABD PELV WO CONT    INDICATION: Evaluation for renal or ureteral stones    COMPARISON: CT from the prior day    CONTRAST:  None. TECHNIQUE:   Thin axial images were obtained through the abdomen and pelvis. Coronal and  sagittal reformats were generated. Oral contrast was not administered. CT dose  reduction was achieved through use of a standardized protocol tailored for this  examination and automatic exposure control for dose modulation.      The absence of intravenous contrast material reduces the sensitivity for  evaluation of the vasculature and solid organs. FINDINGS:   LOWER THORAX: No significant abnormality in the incidentally imaged lower chest.  LIVER: No mass. BILIARY TREE: Prior cholecystectomy. Mild pneumobilia. CBD is not dilated. SPLEEN: within normal limits. PANCREAS: No focal abnormality. ADRENALS: Unremarkable. KIDNEYS/URETERS: Nonobstructing 2 and 3 mm calculi. Left hydronephrosis and  hydroureter to the level of the pelvis. STOMACH: Unremarkable. SMALL BOWEL: No dilatation or wall thickening. COLON: No dilatation or wall thickening. Sigmoid diverticulosis. APPENDIX:  PERITONEUM: No ascites or pneumoperitoneum. RETROPERITONEUM: No lymphadenopathy or aortic aneurysm. REPRODUCTIVE ORGANS: Prostatomegaly with prostate calcifications  URINARY BLADDER: No mass or calculus. BONES: No destructive bone lesion. ABDOMINAL WALL: No mass or hernia. ADDITIONAL COMMENTS: N/A      Impression IMPRESSION:  Nonobstructing right renal calculus  Left hydronephrosis and hydroureter but no distal ureteral calculus  Incidental sigmoid diverticulosis  Mild prostatomegaly         Assessment     Anayeli Andres. is a 62 y. 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 He now presents with acutely worsened abdominal pain and ongoing bilious emesis. Plan     - Plan for attempt at laparoscopic revision of his duodenojejunostomy to a eren-en Y reconstruction, possible open today. A complete discussion of the risks, benefits and alternatives to surgery were discussed with the patient who was keen to proceed. - s/p celiac plexus block this morning.    - Etiology of his elevated bilirubin is unconjugated suggesting either Gilbert's syndrome or possibly a hemolytic source. No signs of biliary obstruction on imaging or EUS.     - No pancreatic ductal dilation noted on MRCP, so I would recommend leaving this anastomosis alone currently.       Gonzalez Buchanan MD  9/9/2020  1:02 PM

## 2020-09-09 NOTE — PERIOP NOTES
TRANSFER - IN REPORT:    Verbal report received from felicity RN (name) on Lillie Alberto.  being received from 219(unit) for ordered procedure      Report consisted of patients Situation, Background, Assessment and   Recommendations(SBAR). Information from the following report(s) SBAR was reviewed with the receiving nurse. Opportunity for questions and clarification was provided. Assessment completed upon patients arrival to unit and care assumed.

## 2020-09-10 LAB
ALBUMIN SERPL-MCNC: 2.6 G/DL (ref 3.5–5)
ALBUMIN/GLOB SERPL: 0.8 {RATIO} (ref 1.1–2.2)
ALP SERPL-CCNC: 280 U/L (ref 45–117)
ALT SERPL-CCNC: 52 U/L (ref 12–78)
ANION GAP SERPL CALC-SCNC: 9 MMOL/L (ref 5–15)
AST SERPL-CCNC: 18 U/L (ref 15–37)
BASOPHILS # BLD: 0 K/UL (ref 0–0.1)
BASOPHILS NFR BLD: 0 % (ref 0–1)
BILIRUB SERPL-MCNC: 0.9 MG/DL (ref 0.2–1)
BUN SERPL-MCNC: 10 MG/DL (ref 6–20)
BUN/CREAT SERPL: 13 (ref 12–20)
CALCIUM SERPL-MCNC: 9.1 MG/DL (ref 8.5–10.1)
CHLORIDE SERPL-SCNC: 103 MMOL/L (ref 97–108)
CO2 SERPL-SCNC: 26 MMOL/L (ref 21–32)
CREAT SERPL-MCNC: 0.77 MG/DL (ref 0.7–1.3)
DIFFERENTIAL METHOD BLD: ABNORMAL
EOSINOPHIL # BLD: 0.1 K/UL (ref 0–0.4)
EOSINOPHIL NFR BLD: 1 % (ref 0–7)
ERYTHROCYTE [DISTWIDTH] IN BLOOD BY AUTOMATED COUNT: 12.5 % (ref 11.5–14.5)
GLOBULIN SER CALC-MCNC: 3.1 G/DL (ref 2–4)
GLUCOSE SERPL-MCNC: 126 MG/DL (ref 65–100)
HCT VFR BLD AUTO: 35.2 % (ref 36.6–50.3)
HGB BLD-MCNC: 11.6 G/DL (ref 12.1–17)
IMM GRANULOCYTES # BLD AUTO: 0.1 K/UL (ref 0–0.04)
IMM GRANULOCYTES NFR BLD AUTO: 1 % (ref 0–0.5)
LYMPHOCYTES # BLD: 0.6 K/UL (ref 0.8–3.5)
LYMPHOCYTES NFR BLD: 7 % (ref 12–49)
MCH RBC QN AUTO: 31.2 PG (ref 26–34)
MCHC RBC AUTO-ENTMCNC: 33 G/DL (ref 30–36.5)
MCV RBC AUTO: 94.6 FL (ref 80–99)
MONOCYTES # BLD: 0.6 K/UL (ref 0–1)
MONOCYTES NFR BLD: 7 % (ref 5–13)
NEUTS SEG # BLD: 7 K/UL (ref 1.8–8)
NEUTS SEG NFR BLD: 84 % (ref 32–75)
NRBC # BLD: 0 K/UL (ref 0–0.01)
NRBC BLD-RTO: 0 PER 100 WBC
PLATELET # BLD AUTO: 178 K/UL (ref 150–400)
PMV BLD AUTO: 10.5 FL (ref 8.9–12.9)
POTASSIUM SERPL-SCNC: 4.5 MMOL/L (ref 3.5–5.1)
PROT SERPL-MCNC: 5.7 G/DL (ref 6.4–8.2)
RBC # BLD AUTO: 3.72 M/UL (ref 4.1–5.7)
RBC MORPH BLD: ABNORMAL
SODIUM SERPL-SCNC: 138 MMOL/L (ref 136–145)
WBC # BLD AUTO: 8.4 K/UL (ref 4.1–11.1)

## 2020-09-10 PROCEDURE — 74011250637 HC RX REV CODE- 250/637: Performed by: INTERNAL MEDICINE

## 2020-09-10 PROCEDURE — 74011250636 HC RX REV CODE- 250/636: Performed by: SURGERY

## 2020-09-10 PROCEDURE — 65270000029 HC RM PRIVATE

## 2020-09-10 PROCEDURE — 99233 SBSQ HOSP IP/OBS HIGH 50: CPT | Performed by: INTERNAL MEDICINE

## 2020-09-10 PROCEDURE — 74011250637 HC RX REV CODE- 250/637: Performed by: SURGERY

## 2020-09-10 PROCEDURE — 80053 COMPREHEN METABOLIC PANEL: CPT

## 2020-09-10 PROCEDURE — 85025 COMPLETE CBC W/AUTO DIFF WBC: CPT

## 2020-09-10 PROCEDURE — 74011000250 HC RX REV CODE- 250: Performed by: SURGERY

## 2020-09-10 PROCEDURE — 36415 COLL VENOUS BLD VENIPUNCTURE: CPT

## 2020-09-10 RX ORDER — LANOLIN ALCOHOL/MO/W.PET/CERES
400 CREAM (GRAM) TOPICAL DAILY
Status: DISCONTINUED | OUTPATIENT
Start: 2020-09-11 | End: 2020-09-11 | Stop reason: HOSPADM

## 2020-09-10 RX ORDER — AMOXICILLIN 250 MG
1 CAPSULE ORAL
Status: COMPLETED | OUTPATIENT
Start: 2020-09-10 | End: 2020-09-10

## 2020-09-10 RX ORDER — VITAMIN A 3000 MCG
50000 CAPSULE ORAL DAILY
Status: DISCONTINUED | OUTPATIENT
Start: 2020-09-11 | End: 2020-09-11 | Stop reason: HOSPADM

## 2020-09-10 RX ORDER — POLYETHYLENE GLYCOL 3350 17 G/17G
17 POWDER, FOR SOLUTION ORAL ONCE
Status: COMPLETED | OUTPATIENT
Start: 2020-09-10 | End: 2020-09-10

## 2020-09-10 RX ORDER — POLYETHYLENE GLYCOL 3350 17 G/17G
17 POWDER, FOR SOLUTION ORAL DAILY
Status: DISCONTINUED | OUTPATIENT
Start: 2020-09-11 | End: 2020-09-11 | Stop reason: HOSPADM

## 2020-09-10 RX ADMIN — Medication 10 ML: at 07:15

## 2020-09-10 RX ADMIN — HYDROMORPHONE HYDROCHLORIDE 2 MG: 1 INJECTION, SOLUTION INTRAMUSCULAR; INTRAVENOUS; SUBCUTANEOUS at 07:14

## 2020-09-10 RX ADMIN — ONDANSETRON 4 MG: 2 INJECTION INTRAMUSCULAR; INTRAVENOUS at 00:51

## 2020-09-10 RX ADMIN — PANTOPRAZOLE SODIUM 40 MG: 40 TABLET, DELAYED RELEASE ORAL at 07:15

## 2020-09-10 RX ADMIN — PANCRELIPASE 6 CAPSULE: 60000; 12000; 38000 CAPSULE, DELAYED RELEASE PELLETS ORAL at 07:15

## 2020-09-10 RX ADMIN — DUTASTERIDE 0.5 MG: 0.5 CAPSULE, LIQUID FILLED ORAL at 10:10

## 2020-09-10 RX ADMIN — Medication 10 ML: at 22:00

## 2020-09-10 RX ADMIN — HYDROMORPHONE HYDROCHLORIDE 2 MG: 1 INJECTION, SOLUTION INTRAMUSCULAR; INTRAVENOUS; SUBCUTANEOUS at 10:10

## 2020-09-10 RX ADMIN — TAMSULOSIN HYDROCHLORIDE 0.4 MG: 0.4 CAPSULE ORAL at 10:10

## 2020-09-10 RX ADMIN — PANCRELIPASE 6 CAPSULE: 60000; 12000; 38000 CAPSULE, DELAYED RELEASE PELLETS ORAL at 13:03

## 2020-09-10 RX ADMIN — Medication 10 ML: at 14:51

## 2020-09-10 RX ADMIN — HYDROMORPHONE HYDROCHLORIDE 4 MG: 2 TABLET ORAL at 16:39

## 2020-09-10 RX ADMIN — HYDROMORPHONE HYDROCHLORIDE 2 MG: 1 INJECTION, SOLUTION INTRAMUSCULAR; INTRAVENOUS; SUBCUTANEOUS at 04:07

## 2020-09-10 RX ADMIN — HEPARIN SODIUM 5000 UNITS: 5000 INJECTION INTRAVENOUS; SUBCUTANEOUS at 16:41

## 2020-09-10 RX ADMIN — DOCUSATE SODIUM 50MG AND SENNOSIDES 8.6MG 1 TABLET: 8.6; 5 TABLET, FILM COATED ORAL at 14:55

## 2020-09-10 RX ADMIN — HEPARIN SODIUM 5000 UNITS: 5000 INJECTION INTRAVENOUS; SUBCUTANEOUS at 07:14

## 2020-09-10 RX ADMIN — SODIUM CHLORIDE 5 MG: 9 INJECTION INTRAMUSCULAR; INTRAVENOUS; SUBCUTANEOUS at 04:07

## 2020-09-10 RX ADMIN — HYDROMORPHONE HYDROCHLORIDE 2 MG: 1 INJECTION, SOLUTION INTRAMUSCULAR; INTRAVENOUS; SUBCUTANEOUS at 00:51

## 2020-09-10 RX ADMIN — HYDROMORPHONE HYDROCHLORIDE 2 MG: 1 INJECTION, SOLUTION INTRAMUSCULAR; INTRAVENOUS; SUBCUTANEOUS at 13:03

## 2020-09-10 RX ADMIN — ONDANSETRON 4 MG: 2 INJECTION INTRAMUSCULAR; INTRAVENOUS at 07:14

## 2020-09-10 RX ADMIN — PANCRELIPASE 4 CAPSULE: 24000; 76000; 120000 CAPSULE, DELAYED RELEASE PELLETS ORAL at 16:40

## 2020-09-10 RX ADMIN — HYDROMORPHONE HYDROCHLORIDE 4 MG: 2 TABLET ORAL at 20:45

## 2020-09-10 RX ADMIN — FINASTERIDE 5 MG: 5 TABLET, FILM COATED ORAL at 10:10

## 2020-09-10 RX ADMIN — POLYETHYLENE GLYCOL 3350 17 G: 17 POWDER, FOR SOLUTION ORAL at 10:11

## 2020-09-10 RX ADMIN — SODIUM CHLORIDE 5 MG: 9 INJECTION INTRAMUSCULAR; INTRAVENOUS; SUBCUTANEOUS at 10:10

## 2020-09-10 NOTE — OP NOTES
[address]   OPERATIVE REPORT    Name:  Pam Rosa  MR#:  [id number]  :  1955  ACCOUNT #:  [de-identified]  DATE OF SERVICE:  2020    PREOPERATIVE DIAGNOSES:  1.  Bile reflux. 2.  Recurrent cholangiocarcinoma with history of pylorus-sparing Whipple procedure. POSTOPERATIVE DIAGNOSES:  1.  Bile reflux. 2.  Recurrent cholangiocarcinoma with history of pylorus-sparing Whipple procedure. PROCEDURE PERFORMED:  1. Laparoscopic revision of duodenojejunostomy. 2.  George-en-Y reconstruction. SURGEON:  Rafael Monroe MD    SURGICAL ASSISTANT:  Mauricio Murrell SA, and Jamie Barrett    ANESTHESIA:  General.    COMPLICATIONS:  None. SPECIMENS REMOVED:  None. IMPLANTS:  None. ESTIMATED BLOOD LOSS:  50 mL. DISPOSITION:  PACU. INDICATIONS:  The patient is a 81-year-old male with history of a distal cholangiocarcinoma, who underwent a Whipple procedure approximately 3 years ago. He has had problems since that time with bile reflux despite the fact that this was a pylorus-sparing Whipple procedure. He has requested surgical revision to a George-en-Y reconstruction to see if this helps improve his quality of life due to ongoing bile reflux leading to gastritis and vomiting. A complete discussion of risks, benefits and alternatives of surgery was had with the patient. He was in agreement to proceed. Informed consent was obtained. DESCRIPTION OF OPERATION:  After informed consent was obtained, the patient was brought back to the operating room. He was placed under general endotracheal anesthesia in the supine position on the operating room table. His arms were extended, and a footboard was placed. He was prepped and draped in the usual sterile fashion after a Vega catheter was placed. A proper time-out was performed. With this completed, we placed a Veress needle into the left upper quadrant 1 fingerbreadth below the costal margin in the midclavicular line. Standard water drop technique test was performed, and the abdomen was insufflated to 15 mmHg. Once this was completed, we then tunneled into the abdomen using a 5 mm Optiview trocar in the left lateral abdomen approximately at the level of the umbilicus. The abdomen was entered safely, and the Veress needle site was inspected and then removed. We placed additional trocars including a left paramedian 5 mm trocar, a right paramedian 12 mm trocar and a right lateral abdominal 5 mm trocar. These were all approximately at the level of the umbilicus. The patient had numerous adhesions predominantly of omentum, but also some interloop small bowel adhesions up to the anterior abdominal wall. We placed the patient in some reverse Trendelenburg position, and using a LigaSure and laparoscopic scissors, meticulously performed a lysis of adhesions to clear the anterior abdominal wall until we reached the level of the transverse colon. Once we reached this and were able to identify the anterior surface of the stomach, we were able to identify the patient's reconstruction anatomy from his previous pylorus-sparing Whipple. The distal stomach and pylorus were identified as was the anastomosis which was a duodenojejunostomy that was an end-to-side anastomosis. We ran the small bowel distal to this approximately 75 cm and no adhesions were noted. Proximal to this, we were able to run the biliopancreatic limb back up to its insertion site through the defect made in the transverse colon mesentery. Once this anatomy had been sorted out, we then divided the biliopancreatic limb just proximal to the duodenojejunostomy using 60 mm tan Endo DALJIT staple load. We then split the mesentery to this down towards its root to allow for mobility of the limb for George-en-Y reconstruction.   In doing this, one significant vessel was sealed, and this made the tip of the biliopancreatic limb appear ischemic, and thus I divided this and removed that segment as well using additional 60 mm tan Endo DALJIT staple load. This was an approximately 1.5 to 2 cm segment of small bowel. We then counted down distal to the duodenojejunostomy 60 cm and aligned the bowel in an isoperistaltic fashion, aligning the antimesenteric borders using 3-0 silk sutures both proximal and distal to our planned staple line. Once this was aligned, an enterotomy was made in both pieces of bowel, and a 60 mm tan Endo DALJIT staple load was used to create the anastomosis. This came together very nicely. The common enterotomy was then closed using a 3-0 V-Loc suture in running fashion to close the defect primarily and a second 3-0 V-Loc was used then for a running Lembert style closure, for second layer closure. The anastomosis appeared healthy and without evidence of leakage or bleeding. We then used 3-0 V-Loc suture to close the mesenteric defect which was meticulously closed to prevent internal herniation. We had a small amount of bleeding at one point from the mesentery, which was controlled, and all this was suctioned clear. We reinspected the anastomosis, and this was healthy appearing and without twisting the mesentery and in normal orientation. We then turned our attention to the 12 mm trocar site. This was closed at the level of fascia using an 0 Vicryl suture on a suture passer, and the abdomen was then allowed to desufflate. The remaining trocars were removed. The skin incisions were all closed using 4-0 Monocryl subcuticular  stitches followed by Dermabond skin adhesive. The patient was then awakened, extubated and taken to the postanesthesia care unit in stable condition. All needle and instrument counts were correct at the completion of the case. I was present and scrubbed throughout the entirety of the case. There were no immediate complications.       MD TIANNA [id number]  D:  09/09/2020 17:45  T:  09/09/2020 20:54  JOB #:  [id number]

## 2020-09-10 NOTE — PROGRESS NOTES
Bedside shift change report given to [address] (oncoming nurse) by Wilson Florian (offgoing nurse). Report included the following information SBAR, Kardex, MAR and Recent Results.

## 2020-09-10 NOTE — PROGRESS NOTES
Comprehensive Nutrition Assessment    Type and Reason for Visit: Reassess    Nutrition Recommendations/Plan:    1. Increase Creon dose:   - Creon 24,000 - 4 capsules per meal    - titrate up as needed to max dose of Creon 24,000 - 7 capsules per meal   - adjust order for snacks to 1/2 of meal dosing    2. Replete fat-soluble vitamin deficiencies:   - vitamin A: 50,000IU per day x 2 weeks; followed by 20,000IU x 2 months    - vitamin E: 500IU per day   - have labs recheck at PCP to avoid toxicity     Nutrition Assessment:    Pt admitted for nausea and vomiting. PMHx: Sjogren's dx, bile duct adenocarcinoma, pancreatic CA s/p pylorus-preserving Whipple (2017), stroke, HTN, DM. Abd pain with N/V prior to admit. Recurrence of cancer s/p Whipple and chemo in 2017. PET scan in 6/2020 showing increased siz of adenocarcinoma. Followed by [employer] for oncology care. Palliative Care following. S/p celiac block on 9/8 with revision of duodenojejunostomy to eren-en Y, extensive lysis of adhesions yesterday. Pt visited today. Diet advanced to full liquids with plans for GI lite tomorrow. Doing well with Ensure Clear which he is drinking with each meal (was drinking at home). Diarrhea has also been an issue with Creon started earlier this year. No improvement in stool consistency since Creon. Consider adjustment of Creon dose (see above) to see if will help with stools. No diarrhea since diet resumed. Of note: Low vitamin A and vitamin E likely d/t fat malabsorption. Vit D WNL. Will need to replete and may need to continue maintenance AquaDEKs once WNL. Reviewed recommendations for increasing intake and tolerance s/p surgery and with severe wt loss prior to admit. No questions at this time. UBW prior to initial dx in 2017 was around 245#. Following Whipple has sepsis with severe wt loss to around 165#. Has been stable at this until recently with 15# (10%) wt loss x ~8 months.      Malnutrition Assessment:  Malnutrition Status:  Severe malnutrition    Context:  Acute illness     Findings of the 6 clinical characteristics of malnutrition:   Energy Intake:  1 - 75% or less of est energy req for 7 or more days  Weight Loss:  Unable to assess     Body Fat Loss:  7 - Moderate body fat loss, Triceps, Orbital   Muscle Mass Loss:  7 - Moderate muscle mass loss, Scapula (trapezius)  Fluid Accumulation:  Unable to assess,     Strength:  Not performed     Nutritionally Significant Medications: Creon 70256 (6 capsules 3x/day), miralax, pericolace, protonix, LR @ 100ml/hr; PRN: zofran, imodium, zofran, compazine, simethicone    Estimated Daily Nutrient Needs:  Energy (kcal):  4460-6362(MSJ x 1.3-1.4 + 250kcal)  Protein (g):  95-109g (1..4-1.6g/kg)       Fluid (ml/day):  2200 - 1ml/kcal    Nutrition Related Findings: 9/7 - loose  Wounds:  None       Current Nutrition Therapies:   Diet: full liquids  Supplements: Ensure Clear TID  Meal intake:   Patient Vitals for the past 168 hrs:   % Diet Eaten   09/08/20 1323 100 %   09/08/20 0905 100 %   09/04/20 1431 95 %   09/04/20 0911 100 %     Anthropometric Measures:  · Height:  5' 8\" (172.7 cm)  · Current Body Wt:  68 kg (149 lb 14.6 oz)   · Admission Body Wt:  149 lb 14.6 oz    · Usual Body Wt:  165 lb     · Ideal Body Wt:   :  97.3 %   Wt Readings from Last 10 Encounters:   09/09/20 65.8 kg (145 lb)   09/03/20 65.8 kg (145 lb)   07/06/20 68 kg (150 lb)   06/02/20 68 kg (150 lb)   05/06/20 66.2 kg (146 lb)   04/06/20 68.5 kg (151 lb)   04/01/20 68.9 kg (152 lb)   02/28/20 71.6 kg (157 lb 12.8 oz)   02/11/20 73.1 kg (161 lb 3.2 oz)   02/03/20 71.7 kg (158 lb)     Nutrition Diagnosis:   · Unintended weight loss, Inadequate protein-energy intake, Severe malnutrition related to increased demand for energy/nutrients, altered GI function as evidenced by weight loss, poor intake prior to admission, nausea, vomiting(pancreatic CA)    · Impaired nutrient utilization related to altered GI function as evidenced by diarrhea(pancreatic CA w/ PERT)    Nutrition Interventions:   Food and/or Nutrient Delivery: Continue current diet, Continue oral nutrition supplement  Nutrition Education and Counseling: Education completed  Coordination of Nutrition Care: Continued inpatient monitoring, Interdisciplinary rounds    Goals:  Continued consumption of at least 75% meals and supplements; wt maintenance       Nutrition Monitoring and Evaluation:   Behavioral-Environmental Outcomes: Knowledge or skill  Food/Nutrient Intake Outcomes: Food and nutrient intake, Supplement intake, Vitamin/mineral intake  Physical Signs/Symptoms Outcomes: Biochemical data, Diarrhea, GI status, Weight    Discharge Planning:    Continue oral nutrition supplement, Continue current diet     Shara Capone, RD  [address] , Pager #[phone] or via [employer]

## 2020-09-10 NOTE — PROGRESS NOTES
Patient resting in bed, complaints of abdominal pain, shoulder pain, relieved by opioids. Patient up ad lola. Problem: Falls - Risk of  Goal: *Absence of Falls  Description: Document Von Proctor Fall Risk and appropriate interventions in the flowsheet.   Outcome: Progressing Towards Goal  Note: Fall Risk Interventions:            Medication Interventions: Evaluate medications/consider consulting pharmacy, Patient to call before getting OOB, Teach patient to arise slowly    Elimination Interventions: Patient to call for help with toileting needs              Problem: Patient Education: Go to Patient Education Activity  Goal: Patient/Family Education  Outcome: Progressing Towards Goal     Problem: Discharge Planning  Goal: *Discharge to safe environment  Outcome: Progressing Towards Goal     Problem: Pain  Goal: *Control of Pain  Outcome: Progressing Towards Goal  Goal: *PALLIATIVE CARE:  Alleviation of Pain  Outcome: Progressing Towards Goal     Problem: Diarrhea (Adult and Pediatrics)  Goal: *Absence of diarrhea  Outcome: Progressing Towards Goal     Problem: Patient Education: Go to Patient Education Activity  Goal: Patient/Family Education  Outcome: Progressing Towards Goal     Problem: Nausea/Vomiting (Adult)  Goal: *Absence of nausea/vomiting  Outcome: Progressing Towards Goal     Problem: Nutrition Deficit  Goal: *Optimize nutritional status  Outcome: Progressing Towards Goal

## 2020-09-10 NOTE — PROGRESS NOTES
[address]  [address]av[address] Pr[address]han, [address]  [phone]                GI PROGRESS NOTE      NAME:   Ana Paula Romero :    1955   MRN:    [id number]     Assessment/Plan   Abdominal pain, N/V:  Chronic pancreatic duct stricture previously treated with stenting  - Stent removed 2020, but unable to replace  - Alk phos (280)  and TBili (0.9) improving   - Lipase normal - Previous triglyceride and IgG4 normal  - EUS on 2020 Celiac plexus neurolysis   - status post laparoscopic revision of duodenojejunostomy.  George-en-Y reconstruction with Dr. Gabby Hood on 2020   - Pain management and anti-emetics prn  -Diet advancement per surgery     Extrahepatic cholangiocarcinoma: diagnosed , treated with pylorus-preserving Whipple and chemo  - T3 N1 (1 of 12 LN +ve); Invasion into pancreas; R0 resection  - CT guided biopsy 2/3/2020 of paraaortic soft tissue mass - positive for adenocarcinoma  - PET scan 2020 showed increased size  - Follows with Dr. Lc Buck and has been seen by Palliative OP     Will discuss with Dr Martinez Man      Patient Active Problem List   Diagnosis Code    Obstructive jaundice K83.1    Common bile duct (CBD) obstruction K83.1    UTI (urinary tract infection) N39.0    Cholangiocarcinoma of biliary tract ([address] C22.1    Cholangiocarcinoma determined by biopsy of biliary tract ([address] C24.9    Paroxysmal atrial fibrillation ([address] I48.0    S/P ablation of atrial fibrillation Z98.890, Z86.79    Essential hypertension I10    Cramps, muscle, general R25.2    Low vitamin D level R79.89    Low vitamin B12 level E53.8    Vomiting R11.10    Controlled type 2 diabetes mellitus without complication, without long-term current use of insulin (HCC) E11.9    Acute pancreatitis K85.90    Leukocytosis D72.829    Pancreatitis K85.90    Abdominal pain R10.9    Nausea & vomiting R11.2       Subjective:     Ana Paula Romero is a 62 y.o.  male Patient stated doing well this morning. Complaint of some mild incisional pain. No nausea, no vomiting, no chest pain, no abdominal pain. Tolerating clear liquids. Objective:     VITALS:   Last 24hrs VS reviewed since prior hospitalist progress note. Most recent are:  Visit Vitals  /73 (BP 1 Location: Left arm, BP Patient Position: At rest)   Pulse 62   Temp 98.3 °F (36.8 °C)   Resp 14   Ht 5' 8\" (1.727 m)   Wt 65.8 kg (145 lb)   SpO2 97%   BMI 22.05 kg/m²       Intake/Output Summary (Last 24 hours) at 9/10/2020 1133  Last data filed at 9/10/2020 0430  Gross per 24 hour   Intake 2300 ml   Output 2000 ml   Net 300 ml        PHYSICAL EXAM:  General   well developed, well nourished, in no acute distress  EENT  Normocephalic, Atraumatic,  sclera clear  Respiratory   Clear To Auscultation   Cardiology  Regular Rate and Rhythm   Abdominal  Soft, mild generalized tenderness with deep palpation, non-distended, bowel sounds present, surgical incisions dry and intact  Neurological  No focal neurological deficits noted  Psychological  Oriented x 3.  Normal affect         Lab Data   Recent Results (from the past 12 hour(s))   METABOLIC PANEL, COMPREHENSIVE    Collection Time: 09/10/20  4:23 AM   Result Value Ref Range    Sodium 138 136 - 145 mmol/L    Potassium 4.5 3.5 - 5.1 mmol/L    Chloride 103 97 - 108 mmol/L    CO2 26 21 - 32 mmol/L    Anion gap 9 5 - 15 mmol/L    Glucose 126 (H) 65 - 100 mg/dL    BUN 10 6 - 20 MG/DL    Creatinine 0.77 0.70 - 1.30 MG/DL    BUN/Creatinine ratio 13 12 - 20      GFR est AA >60 >60 ml/min/1.73m2    GFR est non-AA >60 >60 ml/min/1.73m2    Calcium 9.1 8.5 - 10.1 MG/DL    Bilirubin, total 0.9 0.2 - 1.0 MG/DL    ALT (SGPT) 52 12 - 78 U/L    AST (SGOT) 18 15 - 37 U/L    Alk.  phosphatase 280 (H) 45 - 117 U/L    Protein, total 5.7 (L) 6.4 - 8.2 g/dL    Albumin 2.6 (L) 3.5 - 5.0 g/dL    Globulin 3.1 2.0 - 4.0 g/dL    A-G Ratio 0.8 (L) 1.1 - 2.2     CBC WITH AUTOMATED DIFF    Collection Time: 09/10/20 10:32 AM   Result Value Ref Range    WBC 8.4 4.1 - 11.1 K/uL    RBC 3.72 (L) 4.10 - 5.70 M/uL    HGB 11.6 (L) 12.1 - 17.0 g/dL    HCT 35.2 (L) 36.6 - 50.3 %    MCV 94.6 80.0 - 99.0 FL    MCH 31.2 26.0 - 34.0 PG    MCHC 33.0 30.0 - 36.5 g/dL    RDW 12.5 11.5 - 14.5 %    PLATELET 667 626 - 529 K/uL    MPV 10.5 8.9 - 12.9 FL    NRBC 0.0 0  WBC    ABSOLUTE NRBC 0.00 0.00 - 0.01 K/uL    NEUTROPHILS PENDING %    LYMPHOCYTES PENDING %    MONOCYTES PENDING %    EOSINOPHILS PENDING %    BASOPHILS PENDING %    IMMATURE GRANULOCYTES PENDING %    ABS. NEUTROPHILS PENDING K/UL    ABS. LYMPHOCYTES PENDING K/UL    ABS. MONOCYTES PENDING K/UL    ABS. EOSINOPHILS PENDING K/UL    ABS. BASOPHILS PENDING K/UL    ABS. IMM. GRANS.  PENDING K/UL    DF PENDING          Medications: Reviewed  Junior Ward NP

## 2020-09-10 NOTE — PROGRESS NOTES
[facility]    POD#1 s/p laparoscopic revision of duodenojejunostomy to eren-en Y, extensive lysis of adhesions. Subjective     No acute events overnight. Tolerated clears without n/v.  Pain well controlled. No complaints of back pain. Passing some flatus. No BM yet. Feels constipated and requesting miralax.          Objective     Patient Vitals for the past 24 hrs:   Temp Pulse Resp BP SpO2   09/10/20 0351 98.8 °F (37.1 °C) 67 14 103/64 99 %   09/10/20 0034 98.8 °F (37.1 °C) 71 14 111/69 (!) 71 %   09/09/20 2305 99 °F (37.2 °C) 76 14 109/73 91 %   09/09/20 2203 97.9 °F (36.6 °C) 72 14 108/65 95 %   09/09/20 2035 97.9 °F (36.6 °C) 69 14 117/75 97 %   09/09/20 2015  72 14 122/70 98 %   09/09/20 2000  69 11 111/68 98 %   09/09/20 1945  70 16 116/67 97 %   09/09/20 1935  74 13  98 %   09/09/20 1930  68 13 112/63 96 %   09/09/20 1925  71 9  97 %   09/09/20 1920  69 14  97 %   09/09/20 1915  80 14 112/66 97 %   09/09/20 1910  70 11  94 %   09/09/20 1905  73 12  95 %   09/09/20 1900  77 29 102/62 93 %   09/09/20 1855  65 13  91 %   09/09/20 1850  65 9  90 %   09/09/20 1845  67 11 96/50 90 %   09/09/20 1840 97.8 °F (36.6 °C) 66 10  91 %   09/09/20 1835  66 11  91 %   09/09/20 1830    98/52    09/09/20 1825  64 9  90 %   09/09/20 1820  65 10  90 %   09/09/20 1815  67 9 103/53 90 %   09/09/20 1810  71 12  92 %   09/09/20 1805  77 14  93 %   09/09/20 1800  71 13 114/62 91 %   09/09/20 1755  68 11  91 %   09/09/20 1750  66 12  91 %   09/09/20 1745  66 13 104/58 92 %   09/09/20 1740  72 13 116/67 93 %   09/09/20 1735  81 17 132/68 96 %   09/09/20 1731 98 °F (36.7 °C) 95 18 136/75 98 %   09/09/20 1730  (!) 101 19 153/88 96 %   09/09/20 1109 98.8 °F (37.1 °C) 79 18 98/58 97 %       Date 09/09/20 0700 - 09/10/20 0659 09/10/20 0700 - 09/11/20 0659   Shift 0990-4128 2524-6847 24 Hour Total 2226-2659 5524-7299 24 Hour Total   INTAKE   I.V.(mL/kg/hr) 2400(3)  2400(1.5)        Volume (0.9% sodium chloride infusion) 100  100        Volume (lactated Ringers infusion) 1500  1500        Volume (lactated Ringers infusion) 800  800      Shift Total(mL/kg) 2400(36.5)  2400(36.5)      OUTPUT   Urine(mL/kg/hr) 750(1) 1200(1.5) 1950(1.2)        Urine Output 750  750        Urine Output (mL) ([REMOVED] Urinary Catheter 09/09/20 2- way; [place])  1200 1200      Blood 50  50        Estimated Blood Loss 50  50      Shift Total(mL/kg) 800(12.2) 1200(18.2) 2000(30.4)      NET 1600 -1200 400      Weight (kg) 65.8 65.8 65.8 65.8 65.8 65.8       PE  GEN - Awake, alert, communicating appropriately. NAD  Pulm - CTAB  CV - RRR  Abd - soft, ND, appropriately tender. Incisions c/d/i. Ext - warm, well perfused. Labs  Recent Results (from the past 24 hour(s))   GLUCOSE, POC    Collection Time: 09/09/20 11:57 AM   Result Value Ref Range    Glucose (POC) 129 (H) 65 - 100 mg/dL    Performed by Cynthia Shelby    GLUCOSE, POC    Collection Time: 09/09/20  6:00 PM   Result Value Ref Range    Glucose (POC) 185 (H) 65 - 100 mg/dL    Performed by [address] [place] [address], [facility]    Collection Time: 09/10/20  4:23 AM   Result Value Ref Range    Sodium 138 136 - 145 mmol/L    Potassium 4.5 3.5 - 5.1 mmol/L    Chloride 103 97 - 108 mmol/L    CO2 26 21 - 32 mmol/L    Anion gap 9 5 - 15 mmol/L    Glucose 126 (H) 65 - 100 mg/dL    BUN 10 6 - 20 MG/DL    Creatinine 0.77 0.70 - 1.30 MG/DL    BUN/Creatinine ratio 13 12 - 20      GFR est AA >60 >60 ml/min/1.73m2    GFR est non-AA >60 >60 ml/min/1.73m2    Calcium 9.1 8.5 - 10.1 MG/DL    Bilirubin, total 0.9 0.2 - 1.0 MG/DL    ALT (SGPT) 52 12 - 78 U/L    AST (SGOT) 18 15 - 37 U/L    Alk.  phosphatase 280 (H) 45 - 117 U/L    Protein, total 5.7 (L) 6.4 - 8.2 g/dL    Albumin 2.6 (L) 3.5 - 5.0 g/dL    Globulin 3.1 2.0 - 4.0 g/dL    A-G Ratio 0.8 (L) 1.1 - 2.2       MRI Results (most recent):  Results from [place] encounter on 09/02/20   MRI ABD W MRCP W WO CONT    Narrative MRI ABDOMEN AND MRCP WITH AND WITHOUT CONTRAST. 9/3/2020 11:15 PM    INDICATION: Elevated bilirubin. History of Whipple for cholangiocarcinoma. Stricture of the pancreatic anastomosis. COMPARISON: CT abdomen pelvis 9/3/2020, 9/2/2020, 12/4/2019. TECHNIQUE: Multisequence and multiplanar MRI of the abdomen was performed after  the administration of 7 mL of Gadavist (gadobutrol)  IV contrast. Images were  obtained without contrast; and in late arterial, portal venous, and delayed  phases after the administration of contrast.     Heavily T2-weighted thick slab and thin slice images were obtained in the  oblique coronal plane through the biliary tree (MRCP). FINDINGS:   Recurrence: Abnormal soft tissue centered between the common hepatic artery and  the SMA has increased. On 12/4/2019, the SMA and common hepatic artery where  encased in the celiac artery was abutted. It measured approximately 16 x 44 x 24  mm in greatest dimensions Evaluation at that time was compounded by superimposed  acute pancreatitis, however. On today's examination and on 9/2/2020, it measured  34 x 47 x 58 mm. This spiculated, T1 hypointense mass shows slight enhancement on delayed phases  (13-67, 10-67). It now encases the superior mesenteric artery and narrows it  from the origin over greater than 2 cm. There is poststenotic dilation of the  midportion. The celiac and common hepatic arteries are encased but not narrowed. The bilateral renal arteries are encased and more mildly narrowed. The left  renal vein is obliterated, and the left kidney drains via collaterals to the  left gonadal splenic, and lumbar veins. The aorta is abutted over 180 degrees. This represents recurrent tumor until proven otherwise. Retroperitoneal fibrosis  is a less likely possibility. Abdomen:  There is heterogeneous perfusion throughout the liver on arterial  phase, with no correlate on portal venous phase, and delayed phase, or  T2-weighted images. This is more than typically seen as perfusion anomalies. It  is indeterminate, but of questionable significance. The portal and splenic veins remain dilated. No downstream narrowing. No overt  cirrhosis. No superior mesenteric vein dilation. Moderate left retroperitoneal edema is likely due to renal vein occlusion. A  trace left pleural effusion and trace left paracolic ascites are also  associated. No pancreatic or biliary duct dilation. Incidental note is made of left renal  cysts and lower thoracic nerve root sleeve cysts. The distal esophagus, stomach,  gastrojejunostomy, biliary limb, spleen, adrenals, and kidneys are otherwise  normal. Descending diverticulosis is mild. Post L5-S1 fusion. Impression IMPRESSION:   1. Perivascular recurrence in the superior retroperitoneum extends around the  celiac, common hepatic, superior mesenteric, and bilateral renal arteries. 2. Left renal vein occlusion. Moderate consequent, left retroperitoneal edema,  trace ascites, and trace left pleural effusion. 3. Narrowing of the origin and proximal SMA. Poststenotic dilation of the  midportion. 4. More mild narrowing of the bilateral renal arteries. CT Results (most recent):  Results from Hospital Encounter encounter on 09/02/20   CT ABD PELV WO CONT    Narrative EXAM: CT ABD PELV WO CONT    INDICATION: Evaluation for renal or ureteral stones    COMPARISON: CT from the prior day    CONTRAST:  None. TECHNIQUE:   Thin axial images were obtained through the abdomen and pelvis. Coronal and  sagittal reformats were generated. Oral contrast was not administered. CT dose  reduction was achieved through use of a standardized protocol tailored for this  examination and automatic exposure control for dose modulation.      The absence of intravenous contrast material reduces the sensitivity for  evaluation of the vasculature and solid organs. FINDINGS:   LOWER THORAX: No significant abnormality in the incidentally imaged lower chest.  LIVER: No mass. BILIARY TREE: Prior cholecystectomy. Mild pneumobilia. CBD is not dilated. SPLEEN: within normal limits. PANCREAS: No focal abnormality. ADRENALS: Unremarkable. KIDNEYS/URETERS: Nonobstructing 2 and 3 mm calculi. Left hydronephrosis and  hydroureter to the level of the pelvis. STOMACH: Unremarkable. SMALL BOWEL: No dilatation or wall thickening. COLON: No dilatation or wall thickening. Sigmoid diverticulosis. APPENDIX:  PERITONEUM: No ascites or pneumoperitoneum. RETROPERITONEUM: No lymphadenopathy or aortic aneurysm. REPRODUCTIVE ORGANS: Prostatomegaly with prostate calcifications  URINARY BLADDER: No mass or calculus. BONES: No destructive bone lesion. ABDOMINAL WALL: No mass or hernia. ADDITIONAL COMMENTS: N/A      Impression IMPRESSION:  Nonobstructing right renal calculus  Left hydronephrosis and hydroureter but no distal ureteral calculus  Incidental sigmoid diverticulosis  Mild prostatomegaly         Assessment     Antonina Smith is a 62 y. 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 He now presents with acutely worsened abdominal pain and ongoing bilious emesis. He is s/p celiac plexus block for the ongoing back/abdominal pain and s/p laparoscopic revision of his duodenojejunostomy to a eren-en Y for the ongoing bilious emesis/bile reflux. Plan     - Advance to full liquids this morning. If does well with this, can have GI lite for dinner or tomorrow. - Miralax x 1 today  - prn pain and nausea control  - Etiology of his elevated bilirubin is unconjugated suggesting either Gilbert's syndrome or possibly a hemolytic source. No signs of biliary obstruction on imaging or EUS.     - No pancreatic ductal dilation noted on MRCP, so I would recommend leaving this anastomosis alone currently.     - AM labs    Marie Grewal MD  9/10/2020  9:10 AM

## 2020-09-10 NOTE — PROGRESS NOTES
Problem: Falls - Risk of  Goal: *Absence of Falls  Description: Document Rosita Martinez Fall Risk and appropriate interventions in the flowsheet. Outcome: Progressing Towards Goal  Note: Fall Risk Interventions:            Medication Interventions: Evaluate medications/consider consulting pharmacy    Elimination Interventions:  Toileting schedule/hourly rounds

## 2020-09-10 NOTE — PROGRESS NOTES
[address] [facility]  Hospitalist Group                                                                                          Hospitalist Progress Note  Manny Mcmahan MD  Answering service: [phone] from in house phone        Date of Service:  9/10/2020  NAME:  Rosellen Prader. :  1955  MRN:  [id number]      Admission Summary:     Patient reports that he started experiencing abdominal pain associated with nausea, vomiting that started about 6 days back. Patient reports that for the past few days the pain has been getting worse, he has been having increased nausea, multiple bouts of vomiting, poor appetite, not able to tolerate p.o. Patient reports that he thinks he has another bout of pancreatitis. Patient reports that he has been taking oxycodone, tramadol, fentanyl patch and antiemetics at home but they have not been working. Patient reports that today he got concerned because he was not able to tolerate p.o., his epigastric pain persisted and he decided to come to the hospital patient was found to have mildly elevated bilirubin and was requested to be admitted under the hospitalist service. Interval history / Subjective:       Seen and examined Mr Carole Boucher today,I am seeing him for the first time. he had George-en -Y and celiac block yesterday. Abdomina pain he reports is better,6/10 now tolerable per him. No nausea or vomiting. Tolerating clears.      Assessment & Plan:     Abdominal pain  -Acute onset abdominal pain, CT without acute pathology  -History of chronic pancreatitis secondary to pancreatic duct stricture, prior stent in the duct was removed in 2020  -s/p EUS guided celiac plexus block on 2020  -s/p revision  George-en-Y 2020    Abnormal LFTs,almost resolved  -Elevated alk phos and T bili, normal transaminases  -MRCP shows no issues with biliary ducts    Hydronephrosis  -Left hydronephrosis, but no other obstruction noted on CT, repeat US shows resolution  -MRCP showing left renal vein occlusion with retroperitoneal edema  -NO hydronephrosis by US on 9/6,urology signed off. 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  -Following oncology as outpatient, currently with radiation therapy    BPH  -Continue home medications    Chronic diarrhea  -Imodium as needed    Code status: FULL  DVT prophylaxis: Heparin    Care Plan discussed with: Patient/Family  Anticipated Disposition: Home w/Family  Anticipated Discharge: Greater than 48 hours     Hospital Problems  Date Reviewed: 7/6/2020          Codes Class Noted POA    Nausea & vomiting ICD-10-CM: R11.2  ICD-9-CM: 787.01  9/2/2020 Unknown        Abdominal pain ICD-10-CM: R10.9  ICD-9-CM: 789.00  12/31/2019 Unknown                Review of Systems:   A comprehensive review of systems was negative except for that written in the HPI. Vital Signs:    Last 24hrs VS reviewed since prior progress note. Most recent are:  Visit Vitals  /64 (BP 1 Location: Left arm, BP Patient Position: Supine)   Pulse 67   Temp 98.8 °F (37.1 °C)   Resp 14   Ht 5' 8\" (1.727 m)   Wt 65.8 kg (145 lb)   SpO2 99%   BMI 22.05 kg/m²         Intake/Output Summary (Last 24 hours) at 9/10/2020 0847  Last data filed at 9/10/2020 0430  Gross per 24 hour   Intake 2300 ml   Output 2000 ml   Net 300 ml        Physical Examination:             Constitutional:  No acute distress, cooperative, pleasant    ENT:  Oral mucosa moist, oropharynx benign. Resp:  CTA bilaterally. No wheezing/rhonchi/rales. No accessory muscle use   CV:  Regular rhythm, normal rate, no murmurs, gallops, rubs    GI: Mild epigastric tenderness w/o guarding or rebound. Musculoskeletal:  No edema, warm, 2+ pulses throughout    Neurologic:  Moves all extremities.   AAOx3, CN II-XII reviewed     Skin:  Good turgor, no rashes or ulcers       Data Review:    Review and/or order of clinical lab test      Labs:     Recent Labs     09/09/20 0215   WBC 5.6   HGB 11.2*   HCT 33.7*   *     Recent Labs     09/10/20  0423 09/09/20 0215 09/08/20  0400    140 136   K 4.5 3.8 3.7    107 103   CO2 26 29 27   BUN 10 10 5*   CREA 0.77 0.57* 0.66*   * 110* 114*   CA 9.1 8.7 8.7     Recent Labs     09/10/20  0423 09/09/20  0215 09/08/20  0400   ALT 52 59 68   * 250* 280*   TBILI 0.9 0.8 1.3*   TP 5.7* 5.3* 5.7*   ALB 2.6* 2.6* 2.8*   GLOB 3.1 2.7 2.9     Recent Labs     09/09/20 0215   INR 1.1   PTP 11.5*   APTT 29.9      No results for input(s): FE, TIBC, PSAT, FERR in the last 72 hours. No results found for: FOL, RBCF   No results for input(s): PH, PCO2, PO2 in the last 72 hours. No results for input(s): CPK, CKNDX, TROIQ in the last 72 hours.     No lab exists for component: CPKMB  Lab Results   Component Value Date/Time    Cholesterol, total 81 09/02/2020 07:54 AM    HDL Cholesterol 49 09/02/2020 07:54 AM    LDL, calculated 22.6 09/02/2020 07:54 AM    Triglyceride 47 09/02/2020 07:54 AM    CHOL/HDL Ratio 1.7 09/02/2020 07:54 AM     Lab Results   Component Value Date/Time    Glucose (POC) 185 (H) 09/09/2020 06:00 PM    Glucose (POC) 129 (H) 09/09/2020 11:57 AM    Glucose (POC) 120 (H) 01/02/2020 11:47 AM    Glucose (POC) 124 (H) 01/02/2020 06:48 AM    Glucose (POC) 109 (H) 01/01/2020 10:04 PM     Lab Results   Component Value Date/Time    Color DARK YELLOW 09/03/2020 07:05 AM    Appearance CLEAR 09/03/2020 07:05 AM    Specific gravity 1.025 09/03/2020 07:05 AM    Specific gravity 1.027 08/16/2018 08:56 AM    pH (UA) 6.0 09/03/2020 07:05 AM    Protein TRACE (A) 09/03/2020 07:05 AM    Glucose Negative 09/03/2020 07:05 AM    Ketone 40 (A) 09/03/2020 07:05 AM    Bilirubin NEGATIVE  12/31/2019 08:12 AM    Urobilinogen 4.0 (H) 09/03/2020 07:05 AM    Nitrites Negative 09/03/2020 07:05 AM    Leukocyte Esterase TRACE (A) 09/03/2020 07:05 AM    Epithelial cells FEW 09/03/2020 07:05 AM    Bacteria Negative 09/03/2020 07:05 AM    WBC 0-4 09/03/2020 07:05 AM    RBC 20-50 09/03/2020 07:05 AM         Medications Reviewed:     Current Facility-Administered Medications   Medication Dose Route Frequency    sodium chloride (NS) flush 5-40 mL  5-40 mL IntraVENous PRN    simethicone (MYLICON) tablet 80 mg  80 mg Oral QID PRN    pantoprazole (PROTONIX) tablet 40 mg  40 mg Oral ACB    acetaminophen (TYLENOL) tablet 650 mg  650 mg Oral Q4H PRN    prochlorperazine (COMPAZINE) with saline injection 5 mg  5 mg IntraVENous Q4H PRN    loperamide (IMODIUM) capsule 4 mg  4 mg Oral Q4H PRN    fentaNYL (DURAGESIC) 12 mcg/hr patch 1 Patch  1 Patch TransDERmal Q72H    fentaNYL (DURAGESIC) 25 mcg/hr patch 1 Patch  1 Patch TransDERmal Q72H    HYDROmorphone (DILAUDID) tablet 2-4 mg  2-4 mg Oral Q4H PRN    heparin (porcine) injection 5,000 Units  5,000 Units SubCUTAneous Q12H    HYDROmorphone (PF) (DILAUDID) injection 2 mg  2 mg IntraVENous Q3H PRN    sodium chloride (NS) flush 5-40 mL  5-40 mL IntraVENous Q8H    sodium chloride (NS) flush 5-40 mL  5-40 mL IntraVENous PRN    ondansetron (ZOFRAN) injection 4 mg  4 mg IntraVENous Q4H PRN    dutasteride (AVODART) capsule 0.5 mg  0.5 mg Oral DAILY    finasteride (PROSCAR) tablet 5 mg  5 mg Oral DAILY    lipase-protease-amylase (CREON 12,000) capsule 6 Cap  6 Cap Oral TID WITH MEALS    tamsulosin (FLOMAX) capsule 0.4 mg  0.4 mg Oral DAILY     ______________________________________________________________________  EXPECTED LENGTH OF STAY: 9d 19h  ACTUAL LENGTH OF STAY:          6                 Jackelin Nation MD

## 2020-09-10 NOTE — PROGRESS NOTES
Palliative Medicine Consult  Jesus: [phone] ([phone])    Patient Name: Taran Sebastian. YOB: 1955    Date of Initial Consult: 9/4/2020  Reason for Consult: Pain management  Requesting Provider: Dr. Michela Ryan  Primary Care Physician: Taniya Dubois DO     SUMMARY:   Rajat Chowdhury is a 62y.o. 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 His chronic back pain has been controlled with fentanyl patch 37.5 mcg every 72 hours. 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 This is second marriage for both of them. He is now admitted with intractable bilateral flank pain, biliary emesis and nausea. MRI shows renal vein thrombosis     Current hospitalization-s/p celiac plexus block. Laparoscopic reversal of Whipple procedure on 9/9/2020       PALLIATIVE DIAGNOSES:   1. Bilateral flank pain   2. chronic low back pain- on fentanyl 37.5 mcg patch plus tramadol 3 tablets/day  3. Chronic nausea and vomiting       PLAN:   1. New bilateral flank pain-  MRI shows renal vein thrombosis. Much improved, question kidney stone. 2.  Chronic back pain- continue home regimen fentanyl patch 37 mcg [25 mcg patch +12 mcg patch]     3. Postoperative pain- received 5 doses of 2 mg IV Dilaudid since midnight. Has not received oral Dilaudid. Please prioritize oral Dilaudid over IV for moderate pain as it last longer and can provide better pain control overall.     4. Constipation-he tends to struggle with diarrhea most of the time and then severe constipation. Important to prevent constipation with increasing opioids and flank pain. Please start Zeny-Colace 2 tablets daily and MiraLAX as needed. Okay to hold if patient is having loose stools or diarrhea. 5. Intractable nausea- On IV Zofran with good relief. .  Please call palliative medicine at [phone] should patient have uncontrolled pain. iInitial consult note routed to primary continuity provider and/or primary health care team members  Communicated plan of care with: Palliative [facility] Team     GOALS OF CARE / TREATMENT PREFERENCES:     GOALS OF CARE:  Patient/Health Care Proxy Stated Goals: Cure    TREATMENT PREFERENCES:   Code Status: Full Code    Advance Care Planning:  [x] [facility] Interdisciplinary Team has updated the ACP Navigator with Health Care Decision Maker and Patient Capacity      [facility] [phone]    Secondary Decision Maker: Ridge Cristina III - Child - [phone]    Supplemental (Other) Decision Maker: [facility] Child - [phone]  Advance Care Planning 8/26/2020   Patient's Healthcare Decision Maker is: Named in scanned ACP document   Primary Decision Maker Name -   Primary Decision [address] Phone Number -   Primary Decision Maker Relationship to Patient -   Confirm Advance Directive Yes, on file   Patient Would Like to Complete Advance Directive -   Does the patient have other document types -       Medical Interventions: Full interventions     Other Instructions:   Artificially Administered Nutrition: No feeding tube     Other:    As far as possible, the palliative care team has discussed with patient / health care proxy about goals of care / treatment preferences for patient.      HISTORY:     History obtained from: Patient    CHIEF COMPLAINT: Flank pain    HPI/SUBJECTIVE:    The patient is:   [x] Verbal and participatory  [] Non-participatory due to:     9/10-sitting up in chair, very happy the procedure went well and pain is much improved. His chronic pain is back to baseline, flank pain resolved, has some postoperative pain which is well-controlled with current regimen. Passing flatus but no bowel movement. 9/9- feeling ok, still needing IV dilaudid more than PO for acute pain control. Anxious about surgery tomorrow but wants to proceed. Patient well-known to me, seen at bedside along with his wife. He talked about the days leading to current admission. He  has been struggling with increasing back pain and upper abdominal pain with increasing vomiting over the last 2 weeks but 2 days prior to admission, he developed severe bilateral flank pain which is new for him, difficulty urination, vomiting and severe nausea. We discussed imaging results so far, he is happy that he gets dizzy in the hospital until the Whipple procedure scheduled.     Clinical Pain Assessment (nonverbal scale for severity on nonverbal patients):   Clinical Pain Assessment  Severity: 6  Location: Bilateral flank and upper abdomen and back  Character: Aching, cramping  Duration: Days  Effect: Functional and emotional  Factors: None in particular  Frequency: Constant          Duration: for how long has pt been experiencing pain (e.g., 2 days, 1 month, years)  Frequency: how often pain is an issue (e.g., several times per day, once every few days, constant)     FUNCTIONAL ASSESSMENT:     Palliative Performance Scale (PPS):  PPS: 70       PSYCHOSOCIAL/SPIRITUAL SCREENING:     Palliative IDT has assessed this patient for cultural preferences / practices and a referral made as appropriate to needs (Cultural Services, Patient Advocacy, Ethics, etc.)    Any spiritual / Worship concerns:  [] Yes /  [x] No    Caregiver Burnout:  [] Yes /  [x] No /  [] No Caregiver Present      Anticipatory grief assessment:   [x] Normal  / [] Maladaptive       ESAS Anxiety: Anxiety: 0    ESAS Depression: Depression: 0        REVIEW OF SYSTEMS:     Positive and pertinent negative findings in ROS are noted above in HPI. The following systems were [x] reviewed / [] unable to be reviewed as noted in HPI  Other findings are noted below. Systems: constitutional, ears/nose/mouth/throat, respiratory, gastrointestinal, genitourinary, musculoskeletal, integumentary, neurologic, psychiatric, endocrine. Positive findings noted below. Modified ESAS Completed by: provider   Fatigue: 3 Drowsiness: 0   Depression: 0 Pain: 6   Anxiety: 0 Nausea: 4   Anorexia: 0 Dyspnea: 0     Constipation: No     Stool Occurrence(s): 1        PHYSICAL EXAM:     From RN flowsheet:  Wt Readings from Last 3 Encounters:   09/09/20 145 lb (65.8 kg)   09/03/20 145 lb (65.8 kg)   07/06/20 150 lb (68 kg)     Blood pressure 118/73, pulse 62, temperature 98.3 °F (36.8 °C), resp. rate 14, height 5' 8\" (1.727 m), weight 145 lb (65.8 kg), SpO2 97 %. Pain Scale 1: Numeric (0 - 10)  Pain Intensity 1: 10  Pain Onset 1: postop  Pain Location 1: Abdomen  Pain Orientation 1: Right, Lower  Pain Description 1: Aching, Constant  Pain Intervention(s) 1: Medication (see MAR)  Last bowel movement, if known:     Constitutional: Alert, oriented  Eyes: pupils equal, anicteric  ENMT: no nasal discharge, moist mucous membranes  Cardiovascular: regular rhythm, distal pulses intact  Respiratory: breathing not labored, symmetric  Gastrointestinal: soft,+bowel sounds, tenderness with palpation of lower abdomen  Musculoskeletal: no deformity, no tenderness to palpation  Skin: warm, dry  Neurologic: following commands, moving all extremities  Psychiatric: full affect, no hallucinations  Other:       HISTORY:     Active Problems:    Abdominal pain (12/31/2019)      Nausea & vomiting (9/2/2020)      Past Medical History:   Diagnosis Date    Arrhythmia     Previous a.fib, ablation, NSR now; NO LONGER FOLLOWED BY CARDIOLOGIST.     Autoimmune disease ([facility] [address]     Frannie Deepika  Cancer ([facility] [address]     COMMON BILE DUCT, ADENOCARCINOMA    Diabetes ([facility] [address]     Family history of skin cancer     Hypertension     Sepsis ([facility] [address] 2017    STENTING TO BILE DUCT    Status post chemotherapy     Stroke [facility]) 2008    brain aneurysm - no deficits    Sun-damaged skin     Sunburn, blistering       Past Surgical History:   Procedure Laterality Date    HX GI      COLONOSCOPY, POLYPS (BENIGN)    HX GI  10/06/2017    [facility]     [address]  2005    Ablation     HX ORTHOPAEDIC  2000    Spinal fusion W/ HARDWARE    HX OTHER SURGICAL  11/07/2017    [place] Cath, Dr. 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   Social History     Tobacco Use    Smoking status: Never Smoker    Smokeless tobacco: Never Used   Substance Use Topics    Alcohol use: Never     Frequency: Never     Comment: very rare     Allergies   Allergen Reactions    Shellfish Derived Anaphylaxis     Soft shell crabs    Morphine Nausea and Vomiting    Pcn [Penicillins] Other (comments)     Pt stated \"always been told PCN\"  Pt tolerated other cephalosporins in the past      Current Facility-Administered Medications   Medication Dose Route Frequency    sodium chloride (NS) flush 5-40 mL  5-40 mL IntraVENous PRN    simethicone (MYLICON) tablet 80 mg  80 mg Oral QID PRN    pantoprazole (PROTONIX) tablet 40 mg  40 mg Oral ACB    acetaminophen (TYLENOL) tablet 650 mg  650 mg Oral Q4H PRN    prochlorperazine (COMPAZINE) with saline injection 5 mg  5 mg IntraVENous Q4H PRN    loperamide (IMODIUM) capsule 4 mg 4 mg Oral Q4H PRN    fentaNYL (DURAGESIC) 12 mcg/hr patch 1 Patch  1 Patch TransDERmal Q72H    fentaNYL (DURAGESIC) 25 mcg/hr patch 1 Patch  1 Patch TransDERmal Q72H    HYDROmorphone (DILAUDID) tablet 2-4 mg  2-4 mg Oral Q4H PRN    heparin (porcine) injection 5,000 Units  5,000 Units SubCUTAneous Q12H    HYDROmorphone (PF) (DILAUDID) injection 2 mg  2 mg IntraVENous Q3H PRN    sodium chloride (NS) flush 5-40 mL  5-40 mL IntraVENous Q8H    sodium chloride (NS) flush 5-40 mL  5-40 mL IntraVENous PRN    ondansetron (ZOFRAN) injection 4 mg  4 mg IntraVENous Q4H PRN    dutasteride (AVODART) capsule 0.5 mg  0.5 mg Oral DAILY    finasteride (PROSCAR) tablet 5 mg  5 mg Oral DAILY    lipase-protease-amylase (CREON 12,000) capsule 6 Cap  6 Cap Oral TID WITH MEALS    tamsulosin (FLOMAX) capsule 0.4 mg  0.4 mg Oral DAILY          LAB AND IMAGING FINDINGS:     Lab Results   Component Value Date/Time    WBC 8.4 09/10/2020 10:32 AM    HGB 11.6 (L) 09/10/2020 10:32 AM    PLATELET 445 18/11/6825 10:32 AM     Lab Results   Component Value Date/Time    Sodium 138 09/10/2020 04:23 AM    Potassium 4.5 09/10/2020 04:23 AM    Chloride 103 09/10/2020 04:23 AM    CO2 26 09/10/2020 04:23 AM    BUN 10 09/10/2020 04:23 AM    Creatinine 0.77 09/10/2020 04:23 AM    Calcium 9.1 09/10/2020 04:23 AM    Magnesium 2.4 12/31/2019 07:37 AM    Phosphorus 4.0 08/18/2018 04:20 AM      Lab Results   Component Value Date/Time    Alk.  phosphatase 280 (H) 09/10/2020 04:23 AM    Protein, total 5.7 (L) 09/10/2020 04:23 AM    Albumin 2.6 (L) 09/10/2020 04:23 AM    Globulin 3.1 09/10/2020 04:23 AM     Lab Results   Component Value Date/Time    INR 1.1 09/09/2020 02:15 AM    Prothrombin time 11.5 (H) 09/09/2020 02:15 AM    aPTT 29.9 09/09/2020 02:15 AM      Lab Results   Component Value Date/Time    Iron 31 (L) 01/02/2020 03:24 AM    TIBC 245 (L) 01/02/2020 03:24 AM    Iron % saturation 13 (L) 01/02/2020 03:24 AM    Ferritin 122 01/02/2020 03:24 AM No results found for: PH, PCO2, PO2  No components found for: GLPOC   No results found for: CPK, CKMB             Total time:   Counseling / coordination time, spent as noted above:   > 50% counseling / coordination?:     Prolonged service was provided for  []30 min   []75 min in face to face time in the presence of the patient, spent as noted above. Time Start:   Time End:   Note: this can only be billed with 04064 (initial) or 83370 (follow up). If multiple start / stop times, list each separately.

## 2020-09-10 NOTE — PROGRESS NOTES
Verbal shift change report given to Tara Alberts RN (oncoming nurse) by Piotr Sumner (offgoing nurse). Report included the following information SBAR, Kardex, OR Summary and MAR     Patient not arrived from PACU as of yet.

## 2020-09-11 VITALS
HEIGHT: 68 IN | TEMPERATURE: 98.5 F | HEART RATE: 73 BPM | WEIGHT: 145 LBS | DIASTOLIC BLOOD PRESSURE: 70 MMHG | OXYGEN SATURATION: 94 % | SYSTOLIC BLOOD PRESSURE: 112 MMHG | RESPIRATION RATE: 16 BRPM | BODY MASS INDEX: 21.98 KG/M2

## 2020-09-11 DIAGNOSIS — C22.1 CHOLANGIOCARCINOMA (HCC): Primary | ICD-10-CM

## 2020-09-11 LAB
ALBUMIN SERPL-MCNC: 2.7 G/DL (ref 3.5–5)
ALBUMIN/GLOB SERPL: 0.9 {RATIO} (ref 1.1–2.2)
ALP SERPL-CCNC: 272 U/L (ref 45–117)
ALT SERPL-CCNC: 50 U/L (ref 12–78)
ANION GAP SERPL CALC-SCNC: 6 MMOL/L (ref 5–15)
AST SERPL-CCNC: 24 U/L (ref 15–37)
BASOPHILS # BLD: 0.1 K/UL (ref 0–0.1)
BASOPHILS NFR BLD: 1 % (ref 0–1)
BILIRUB SERPL-MCNC: 0.6 MG/DL (ref 0.2–1)
BUN SERPL-MCNC: 8 MG/DL (ref 6–20)
BUN/CREAT SERPL: 12 (ref 12–20)
CALCIUM SERPL-MCNC: 9.1 MG/DL (ref 8.5–10.1)
CHLORIDE SERPL-SCNC: 104 MMOL/L (ref 97–108)
CO2 SERPL-SCNC: 27 MMOL/L (ref 21–32)
CREAT SERPL-MCNC: 0.66 MG/DL (ref 0.7–1.3)
DIFFERENTIAL METHOD BLD: ABNORMAL
EOSINOPHIL # BLD: 0.3 K/UL (ref 0–0.4)
EOSINOPHIL NFR BLD: 4 % (ref 0–7)
ERYTHROCYTE [DISTWIDTH] IN BLOOD BY AUTOMATED COUNT: 12.6 % (ref 11.5–14.5)
GLOBULIN SER CALC-MCNC: 2.9 G/DL (ref 2–4)
GLUCOSE SERPL-MCNC: 116 MG/DL (ref 65–100)
HCT VFR BLD AUTO: 35.1 % (ref 36.6–50.3)
HGB BLD-MCNC: 11.5 G/DL (ref 12.1–17)
IMM GRANULOCYTES # BLD AUTO: 0.1 K/UL (ref 0–0.04)
IMM GRANULOCYTES NFR BLD AUTO: 2 % (ref 0–0.5)
LYMPHOCYTES # BLD: 0.7 K/UL (ref 0.8–3.5)
LYMPHOCYTES NFR BLD: 11 % (ref 12–49)
MCH RBC QN AUTO: 31.2 PG (ref 26–34)
MCHC RBC AUTO-ENTMCNC: 32.8 G/DL (ref 30–36.5)
MCV RBC AUTO: 95.1 FL (ref 80–99)
MONOCYTES # BLD: 0.8 K/UL (ref 0–1)
MONOCYTES NFR BLD: 12 % (ref 5–13)
NEUTS SEG # BLD: 4.4 K/UL (ref 1.8–8)
NEUTS SEG NFR BLD: 70 % (ref 32–75)
NRBC # BLD: 0 K/UL (ref 0–0.01)
NRBC BLD-RTO: 0 PER 100 WBC
PLATELET # BLD AUTO: 179 K/UL (ref 150–400)
PMV BLD AUTO: 9.9 FL (ref 8.9–12.9)
POTASSIUM SERPL-SCNC: 3.9 MMOL/L (ref 3.5–5.1)
PROT SERPL-MCNC: 5.6 G/DL (ref 6.4–8.2)
RBC # BLD AUTO: 3.69 M/UL (ref 4.1–5.7)
RBC MORPH BLD: ABNORMAL
RBC MORPH BLD: ABNORMAL
SODIUM SERPL-SCNC: 137 MMOL/L (ref 136–145)
WBC # BLD AUTO: 6.4 K/UL (ref 4.1–11.1)

## 2020-09-11 PROCEDURE — 74011250637 HC RX REV CODE- 250/637: Performed by: SURGERY

## 2020-09-11 PROCEDURE — 74011250636 HC RX REV CODE- 250/636: Performed by: SURGERY

## 2020-09-11 PROCEDURE — 99233 SBSQ HOSP IP/OBS HIGH 50: CPT | Performed by: INTERNAL MEDICINE

## 2020-09-11 PROCEDURE — 85025 COMPLETE CBC W/AUTO DIFF WBC: CPT

## 2020-09-11 PROCEDURE — 74011250637 HC RX REV CODE- 250/637: Performed by: INTERNAL MEDICINE

## 2020-09-11 PROCEDURE — 36415 COLL VENOUS BLD VENIPUNCTURE: CPT

## 2020-09-11 PROCEDURE — 80053 COMPREHEN METABOLIC PANEL: CPT

## 2020-09-11 RX ORDER — PANCRELIPASE 24000; 76000; 120000 [USP'U]/1; [USP'U]/1; [USP'U]/1
4 CAPSULE, DELAYED RELEASE PELLETS ORAL
Qty: 180 CAP | Refills: 1 | Status: SHIPPED | OUTPATIENT
Start: 2020-09-11 | End: 2020-11-18 | Stop reason: SDUPTHER

## 2020-09-11 RX ORDER — HYDROMORPHONE HYDROCHLORIDE 4 MG/1
2-4 TABLET ORAL
Qty: 90 TAB | Refills: 0 | Status: SHIPPED | OUTPATIENT
Start: 2020-09-11 | End: 2020-11-18 | Stop reason: SDUPTHER

## 2020-09-11 RX ORDER — LANOLIN ALCOHOL/MO/W.PET/CERES
400 CREAM (GRAM) TOPICAL DAILY
Qty: 10 CAP | Refills: 0 | Status: SHIPPED | OUTPATIENT
Start: 2020-09-11 | End: 2020-09-11

## 2020-09-11 RX ORDER — ONDANSETRON 4 MG/1
4 TABLET, ORALLY DISINTEGRATING ORAL
Qty: 90 TAB | Refills: 0 | Status: SHIPPED | OUTPATIENT
Start: 2020-09-11 | End: 2020-10-26

## 2020-09-11 RX ORDER — VITAMIN A 3000 MCG
CAPSULE ORAL
Qty: 98 CAP | Refills: 0 | Status: SHIPPED | OUTPATIENT
Start: 2020-09-11 | End: 2020-10-09

## 2020-09-11 RX ORDER — LANOLIN ALCOHOL/MO/W.PET/CERES
400 CREAM (GRAM) TOPICAL DAILY
Qty: 30 CAP | Refills: 0 | Status: SHIPPED | OUTPATIENT
Start: 2020-09-11 | End: 2020-09-21

## 2020-09-11 RX ADMIN — DUTASTERIDE 0.5 MG: 0.5 CAPSULE, LIQUID FILLED ORAL at 09:41

## 2020-09-11 RX ADMIN — PANTOPRAZOLE SODIUM 40 MG: 40 TABLET, DELAYED RELEASE ORAL at 07:10

## 2020-09-11 RX ADMIN — VITAMIN E CAP 400 UNIT 400 UNITS: 400 CAP at 09:53

## 2020-09-11 RX ADMIN — HEPARIN SODIUM 5000 UNITS: 5000 INJECTION INTRAVENOUS; SUBCUTANEOUS at 07:10

## 2020-09-11 RX ADMIN — HYDROMORPHONE HYDROCHLORIDE 4 MG: 2 TABLET ORAL at 00:47

## 2020-09-11 RX ADMIN — POLYETHYLENE GLYCOL 3350 17 G: 17 POWDER, FOR SOLUTION ORAL at 09:42

## 2020-09-11 RX ADMIN — Medication 50000 UNITS: at 09:53

## 2020-09-11 RX ADMIN — PANCRELIPASE 4 CAPSULE: 24000; 76000; 120000 CAPSULE, DELAYED RELEASE PELLETS ORAL at 07:10

## 2020-09-11 RX ADMIN — Medication 10 ML: at 06:00

## 2020-09-11 RX ADMIN — HYDROMORPHONE HYDROCHLORIDE 4 MG: 2 TABLET ORAL at 04:51

## 2020-09-11 RX ADMIN — HYDROMORPHONE HYDROCHLORIDE 4 MG: 2 TABLET ORAL at 09:41

## 2020-09-11 RX ADMIN — FINASTERIDE 5 MG: 5 TABLET, FILM COATED ORAL at 09:41

## 2020-09-11 RX ADMIN — TAMSULOSIN HYDROCHLORIDE 0.4 MG: 0.4 CAPSULE ORAL at 09:42

## 2020-09-11 NOTE — PROGRESS NOTES
Bedside shift change report given to [address] (oncoming nurse) by Salena Lim RN (offgoing nurse). Report included the following information SBAR, Kardex, MAR and Recent Results.

## 2020-09-11 NOTE — DISCHARGE SUMMARY
Discharge Summary       PATIENT ID: Mary Ellen Da Silva. MRN: [id number]   YOB: 1955    DATE OF ADMISSION: 9/2/2020  7:59 AM    DATE OF DISCHARGE: 09/11/20     PRIMARY CARE PROVIDER: Debra Elkins MD    DISCHARGING PROVIDER: Zafar Betancourt MD    To contact this individual call [phone] and ask the  to page. If unavailable ask to be transferred the Adult Hospitalist Department. CONSULTATIONS: IP CONSULT TO GASTROENTEROLOGY  IP CONSULT TO GASTROENTEROLOGY  IP CONSULT TO UROLOGY  IP CONSULT TO PALLIATIVE CARE - PROVIDER  IP CONSULT TO UROLOGY  IP CONSULT TO HOSPITALIST  IP CONSULT TO GENERAL SURGERY    PROCEDURES/SURGERIES: Procedure(s):  LAPAROSCOPIC REVISION OF GASTROJEJUNOSTOMY TO LOLI-EN Y (URGENT)    [id number] [facility] COURSE:   Patient reports that he started experiencing abdominal pain associated with nausea, vomiting that started about 6 days back. Litzy Dumont reports that for the past few days the pain has been getting worse, he has been having increased nausea, multiple bouts of vomiting, poor appetite, not able to tolerate p.o.  Patient reports that he thinks he has another bout of pancreatitis.  Patient reports that he has been taking oxycodone, tramadol, fentanyl patch and antiemetics at home but they have not been working. Litzy Dumont reports that today he got concerned because he was not able to tolerate p.o., his epigastric pain persisted and he decided to come to the hospital patient was found to have mildly elevated bilirubin and was requested to be admitted under the hospitalist service.             DISCHARGE DIAGNOSES / PLAN:    Abdominal pain. Acute onset abdominal pain, CT without acute pathology  -History of chronic pancreatitis secondary to pancreatic duct stricture, prior stent in the duct was removed in July 2020  -s/p EUS guided celiac plexus block on 9/9/2020  -s/p revision  Loli-en-Y 9/9/2020  -pain significantly improved. 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  -Following oncology as outpatient, currently with radiation therapy     BPH  -Continue home medications     Chronic diarrhea  -Imodium as needed      PENDING TEST RESULTS:   At the time of discharge the following test results are still pending: none    FOLLOW UP APPOINTMENTS:    Follow-up Information     Follow up With Specialties Details Why Contact Edwin Sparrow DO Internal Medicine Go on 10/15/2020 Hospital follow up appt has been scheduled for Oct 15 @ 10:30AM CecilEduar Penalozav[address]hes[address]  MOB [address] [place] [address]  [phone]      Simran Douglas MD General Surgery, Surgical Oncology, Colon and Rectal Surgery In 2 weeks Call office to schedule appointment [address] [place], [place] [id number] [phone]               DIET: Regular Diet    ACTIVITY: Activity as tolerated        EQUIPMENT needed: own      DISCHARGE MEDICATIONS:  Discharge Medication List as of 9/11/2020 11:04 AM      START taking these medications    Details   vitamin A (AQUASOL A) 10,000 unit capsule Take 5 Caps by mouth daily for 14 days, THEN 2 Caps daily for 14 days. , Print, Disp-98 Cap,R-0         CONTINUE these medications which have CHANGED    Details   vitamin E, dl,tocopheryl acet, (vitamin E acetate) 400 unit cap capsule Take 1 Cap by mouth daily for 10 days. , Print, Disp-30 Cap,R-0         CONTINUE these medications which have NOT CHANGED    Details loperamide (IMODIUM) 2 mg capsule Take 2-4 mg by mouth as needed for Diarrhea. Give 4 mg after first loose stool. Give an additional 2 mg after each loose stool as needed up to a maximum of 8 doses (16 mg/day). , Historical Med      !! lipase-protease-amylase (Creon) 36,000-114,000- 180,000 unit cpDR capsule Take 1 Cap by mouth as needed (for snacks). 2-3 caps each meal and 1 cap for snacks (see additional order), Historical Med      dutasteride (AVODART) 0.5 mg capsule Take 0.5 mg by mouth daily. , Historical Med      traMADoL (ULTRAM) 50 mg tablet Take 1 Tab by mouth every six (6) hours as needed for Pain for up to 30 days. Max Daily Amount: 200 mg., Normal, Disp-60 Tab,R-3Not to exceed 5 additional fills before 09/13/2020. gabapentin (NEURONTIN) 100 mg capsule Take 3 capsules by mouth twice a day., Normal, Disp-540 Cap,R-3      fentaNYL 37.5 mcg/hour pt72 37.5 mcg by TransDERmal route every seventy-two (72) hours for 30 days. Max Daily Amount: 37.5 mcg., Normal, Disp-10 Patch,R-0      empagliflozin (Jardiance) 25 mg tablet Take 1 Tab by mouth daily. , Normal, Disp-90 Tab,R-3      cyanocobalamin (VITAMIN B12) 1,000 mcg/mL injection INJECT CONTENTS OF ONE VIAL INTRAMUSCULARLY EVERY OTHER WEEK, No Print, Disp-6 mL,R-6      !! Creon 36,000-114,000- 180,000 unit cpDR capsule Take 2-3 Caps by mouth three (3) times daily (with meals). 2-3 caps each meal and 1 cap for snacks (see additional order), Historical Med, PREETI      finasteride (PROSCAR) 5 mg tablet TAKE 1 TABLET BY MOUTH EVERY DAY, Historical Med      ramipriL (ALTACE) 10 mg capsule TAKE 1 CAPSULE BY MOUTH EVERY DAY AT NIGHT, Normal, Disp-90 Cap, R-3      ondansetron (ZOFRAN ODT) 4 mg disintegrating tablet Take 1 Tab by mouth every eight (8) hours as needed for Nausea., Normal, Disp-30 Tab, R-3      acetaminophen (TYLENOL) 325 mg tablet Take 2 Tabs by mouth every four (4) hours as needed for Pain or Fever (Over the counter medication). , Normal, Disp-1 Tab, R-0Over the counter medication      sucralfate (CARAFATE) 1 gram tablet Take 1 g by mouth four (4) times daily. , Historical Med      omeprazole (PRILOSEC) 40 mg capsule Take 1 Cap by mouth daily. , Normal, Disp-30 Cap, R-0      sildenafil citrate (VIAGRA) 50 mg tablet Take 1 Tab by mouth as needed (ED)., Normal, Disp-30 Tab, R-1      tamsulosin (FLOMAX) 0.4 mg capsule TAKE ONE CAPSULE BY MOUTH EVERY EVENING, Historical Med, R-0      alpha-D-galactosidase (BEANO PO) Take 1 Tab by mouth two (2) times a day., Historical Med      cetirizine (ZYRTEC) 10 mg tablet Take 10 mg by mouth nightly., Historical Med       !! - Potential duplicate medications found. Please discuss with provider. NOTIFY YOUR PHYSICIAN FOR ANY OF THE FOLLOWING:   Fever over 101 degrees for 24 hours. Chest pain, shortness of breath, fever, chills, nausea, vomiting, diarrhea, change in mentation, falling, weakness, bleeding. Severe pain or pain not relieved by medications. Or, any other signs or symptoms that you may have questions about. DISPOSITION:   x Home With:   OT  PT  HH  RN       Long term SNF/Inpatient Rehab    Independent/assisted living    Hospice    Other:       PATIENT CONDITION AT DISCHARGE:     Functional status    Poor     Deconditioned    x Independent      Cognition    x Lucid     Forgetful     Dementia      Catheters/lines (plus indication)    Vega     PICC     PEG    x None      Code status   x  Full code     DNR      PHYSICAL EXAMINATION AT DISCHARGE:  General:          Alert, cooperative, no distress, appears stated age. HEENT:           Atraumatic, anicteric sclerae, pink conjunctivae                          No oral ulcers, mucosa moist, throat clear, dentition fair  Neck:               Supple, symmetrical  Lungs:             Clear to auscultation bilaterally. No Wheezing or Rhonchi. No rales. Chest wall:      No tenderness  No Accessory muscle use.   Heart:              Regular  rhythm,  No  murmur   No edema  Abdomen:        Soft, non-tender. Not distended. Bowel sounds normal  Extremities:     No cyanosis. No clubbing,                            Skin turgor normal, Capillary refill normal  Skin:                Not pale. Not Jaundiced  No rashes   Psych:             Not anxious or agitated.   Neurologic:      Alert, moves all extremities, answers questions appropriately and responds to commands       CHRONIC MEDICAL DIAGNOSES:  Problem List as of 9/11/2020 Date Reviewed: 7/6/2020          Codes Class Noted - Resolved    Nausea & vomiting ICD-10-CM: R11.2  ICD-9-CM: 787.01  9/2/2020 - Present        Abdominal pain ICD-10-CM: R10.9  ICD-9-CM: 789.00  12/31/2019 - Present        Pancreatitis ICD-10-CM: K85.90  ICD-9-CM: 286.2  11/17/2019 - Present        Acute pancreatitis ICD-10-CM: K85.90  ICD-9-CM: 834.3  8/16/2018 - Present        Leukocytosis ICD-10-CM: D72.829  ICD-9-CM: 288.60  8/16/2018 - Present        Controlled type 2 diabetes mellitus without complication, without long-term current use of insulin ([facility] [address] (Chronic) ICD-10-CM: E11.9  ICD-9-CM: 250.00  7/30/2018 - Present        Vomiting ICD-10-CM: R11.10  ICD-9-CM: 787.03  7/5/2018 - Present        Paroxysmal atrial fibrillation (HCC) (Chronic) ICD-10-CM: I48.0  ICD-9-CM: 427.31  10/26/2017 - Present        S/P ablation of atrial fibrillation (Chronic) ICD-10-CM: Z98.890, Z86.79  ICD-9-CM: V45.89  10/26/2017 - Present        Essential hypertension (Chronic) ICD-10-CM: I10  ICD-9-CM: 401.9  10/26/2017 - Present        Cramps, muscle, general ICD-10-CM: R25.2  ICD-9-CM: 729.82  10/26/2017 - Present        Low vitamin D level (Chronic) ICD-10-CM: R79.89  ICD-9-CM: 790.6  10/26/2017 - Present        Low vitamin B12 level (Chronic) ICD-10-CM: E53.8  ICD-9-CM: 266.2  10/26/2017 - Present        Cholangiocarcinoma determined by biopsy of biliary tract ([facility] ICD-10-CM: C24.9  ICD-9-CM: 156.9  10/6/2017 - Present        Cholangiocarcinoma of biliary tract ([facility] ICD-10-CM: C22.1  ICD-9-CM: 155.1  9/11/2017 - Present        Common bile duct (CBD) obstruction ICD-10-CM: K83.1  ICD-9-CM: 576.2  7/25/2017 - Present        UTI (urinary tract infection) ICD-10-CM: N39.0  ICD-9-CM: 599.0  7/25/2017 - Present        Obstructive jaundice ICD-10-CM: K83.1  ICD-9-CM: 576.2  7/23/2017 - Present        RESOLVED: Pancreatitis ICD-10-CM: K85.90  ICD-9-CM: 289.0  8/31/2018 - 8/31/2018        RESOLVED: Sjogren's disease ([facility] [address] (Chronic) ICD-10-CM: M35.00  ICD-9-CM: 710.2  10/26/2017 - 12/2/2019        RESOLVED: Sepsis ([facility] [address] ICD-10-CM: A41.9  ICD-9-CM: 038.9, 995.91  8/25/2017 - 8/31/2017              Greater than 30 minutes were spent with the patient on counseling and coordination of care    Signed:    Catalina Renee MD  9/11/2020  9:31 AM

## 2020-09-11 NOTE — PROGRESS NOTES
[address]  [address] [facility] [address]  [place], [address]  [phone]                GI PROGRESS NOTE      NAME:   Grace Myers. :    1955   MRN:    [id number]     Assessment/Plan   Abdominal pain, N/V:  Chronic pancreatic duct stricture previously treated with stenting  - Stent removed 2020, but unable to replace  - Alk phos (280)  and TBili (0.9) improving   - Lipase normal - Previous triglyceride and IgG4 normal  - EUS on 2020 Celiac plexus neurolysis   - status post laparoscopic revision of duodenojejunostomy.  George-en-Y reconstruction with Dr. Santana Luna on 2020   - Pain management and anti-emetics prn  -Diet advancement per surgery, tolerating GI Lite diet   -Plans for discharge per hospitalist and surgery, patient with follow up outpatient with GI in 3-4 weeks.     Extrahepatic cholangiocarcinoma: diagnosed , treated with pylorus-preserving Whipple and chemo  - T3 N1 (1 of 12 LN +ve); Invasion into pancreas; R0 resection  - CT guided biopsy 2/3/2020 of paraaortic soft tissue mass - positive for adenocarcinoma  - PET scan 2020 showed increased size  - Follows with Dr. Shital Pimentel and has been seen by Palliative OP     Will discuss with Dr. Junie Cowden     Patient Active Problem List   Diagnosis Code    Obstructive jaundice K83.1    Common bile duct (CBD) obstruction K83.1    UTI (urinary tract infection) N39.0    Cholangiocarcinoma of biliary tract ([address] C22.1    Cholangiocarcinoma determined by biopsy of biliary tract ([address] C24.9    Paroxysmal atrial fibrillation ([address] I48.0    S/P ablation of atrial fibrillation Z98.890, Z86.79    Essential hypertension I10    Cramps, muscle, general R25.2    Low vitamin D level R79.89    Low vitamin B12 level E53.8    Vomiting R11.10    Controlled type 2 diabetes mellitus without complication, without long-term current use of insulin (HCC) E11.9    Acute pancreatitis K85.90    Leukocytosis D72.829    Pancreatitis K85.90    Abdominal pain R10.9    Nausea & vomiting R11.2       Subjective:     Karin Bowles is a 62 y.o.  male Patient stated doing well, stated he was told he may go home today. Stated he is ready to be discharged. No nausea, no vomiting, no chest pain, no shortness of breath. Stated abdominal pain is tolerable. Tolerating GI Lite diet. Stated has had bowel movement that was soft, no presence of breath       Objective:     VITALS:   Last 24hrs VS reviewed since prior hospitalist progress note.  Most recent are:  Visit Vitals  /70 (BP 1 Location: Right arm, BP Patient Position: At rest)   Pulse 73   Temp 98.5 °F (36.9 °C)   Resp 16   Ht 5' 8\" (1.727 m)   Wt 65.8 kg (145 lb)   SpO2 94%   BMI 22.05 kg/m²       Intake/Output Summary (Last 24 hours) at 9/11/2020 1006  Last data filed at 9/10/2020 1359  Gross per 24 hour   Intake 480 ml   Output    Net 480 ml        PHYSICAL EXAM:  General   well developed, well nourished, in no acute distress  EENT  Normocephalic, Atraumatic,  sclera clear  Respiratory   Clear To Auscultation   Cardiology  Regular Rate and Rhythm   Abdominal  Soft, mild generalized tenderness with deep palpation, non-distended, bowel sounds present, surgical incisions dry and intact  Neurological  No focal neurological deficits noted  Psychological  Oriented x 3.  Normal affect       Lab Data   Recent Results (from the past 12 hour(s))   CBC WITH AUTOMATED DIFF    Collection Time: 09/11/20  4:52 AM   Result Value Ref Range    WBC 6.4 4.1 - 11.1 K/uL    RBC 3.69 (L) 4.10 - 5.70 M/uL    HGB 11.5 (L) 12.1 - 17.0 g/dL    HCT 35.1 (L) 36.6 - 50.3 %    MCV 95.1 80.0 - 99.0 FL    MCH 31.2 26.0 - 34.0 PG    MCHC 32.8 30.0 - 36.5 g/dL    RDW 12.6 11.5 - 14.5 %    PLATELET 678 857 - 158 K/uL    MPV 9.9 8.9 - 12.9 FL    NRBC 0.0 0  WBC    ABSOLUTE NRBC 0.00 0.00 - 0.01 K/uL    NEUTROPHILS 70 32 - 75 %    LYMPHOCYTES 11 (L) 12 - 49 %    MONOCYTES 12 5 - 13 %    EOSINOPHILS 4 0 - 7 % BASOPHILS 1 0 - 1 %    IMMATURE GRANULOCYTES 2 (H) 0.0 - 0.5 %    ABS. NEUTROPHILS 4.4 1.8 - 8.0 K/UL    ABS. LYMPHOCYTES 0.7 (L) 0.8 - 3.5 K/UL    ABS. MONOCYTES 0.8 0.0 - 1.0 K/UL    ABS. EOSINOPHILS 0.3 0.0 - 0.4 K/UL    ABS. BASOPHILS 0.1 0.0 - 0.1 K/UL    ABS. IMM. GRANS. 0.1 (H) 0.00 - 0.04 K/UL    DF SMEAR SCANNED      RBC COMMENTS MACROCYTOSIS  1+        RBC COMMENTS NORMOCYTIC, NORMOCHROMIC     METABOLIC PANEL, COMPREHENSIVE    Collection Time: 09/11/20  4:52 AM   Result Value Ref Range    Sodium 137 136 - 145 mmol/L    Potassium 3.9 3.5 - 5.1 mmol/L    Chloride 104 97 - 108 mmol/L    CO2 27 21 - 32 mmol/L    Anion gap 6 5 - 15 mmol/L    Glucose 116 (H) 65 - 100 mg/dL    BUN 8 6 - 20 MG/DL    Creatinine 0.66 (L) 0.70 - 1.30 MG/DL    BUN/Creatinine ratio 12 12 - 20      GFR est AA >60 >60 ml/min/1.73m2    GFR est non-AA >60 >60 ml/min/1.73m2    Calcium 9.1 8.5 - 10.1 MG/DL    Bilirubin, total 0.6 0.2 - 1.0 MG/DL    ALT (SGPT) 50 12 - 78 U/L    AST (SGOT) 24 15 - 37 U/L    Alk.  phosphatase 272 (H) 45 - 117 U/L    Protein, total 5.6 (L) 6.4 - 8.2 g/dL    Albumin 2.7 (L) 3.5 - 5.0 g/dL    Globulin 2.9 2.0 - 4.0 g/dL    A-G Ratio 0.9 (L) 1.1 - 2.2           Medications: Reviewed  Eitan Araujo NP

## 2020-09-11 NOTE — PROGRESS NOTES
Late entry: patient seen at 07:30 this morning. [facility]    POD#2 s/p laparoscopic revision of duodenojejunostomy to eren-en Y, extensive lysis of adhesions. Subjective     No acute events overnight. Tolerated diet and passing flatus. No n/v.  Pain well controlled. No complaints of back pain. Objective     Patient Vitals for the past 24 hrs:   Temp Pulse Resp BP SpO2   09/11/20 0834 98.5 °F (36.9 °C) 73 16 112/70 94 %   09/11/20 0505 97.9 °F (36.6 °C) 69 16 111/71 94 %   09/10/20 2136 98.3 °F (36.8 °C) 76 16 102/61 94 %   09/10/20 1511 98.3 °F (36.8 °C) 71 14 100/60 95 %       Date 09/10/20 0700 - 09/11/20 0659 09/11/20 0700 - 09/12/20 0659(Discharged)   Shift 8019-1048 9529-3444 24 Hour Total 2366-9157 8090-1330 24 Hour Total   INTAKE   P.O. 480  480        P. O. 480  480      Shift Total(mL/kg) 480(7.3)  480(7.3)      OUTPUT   Shift Total(mL/kg)           480      Weight (kg) 65.8 65.8 65.8 65.8 65.8 65.8       PE  GEN - Awake, alert, communicating appropriately. NAD  Pulm - CTAB  CV - RRR  Abd - soft, ND, appropriately tender. Incisions c/d/i. Ext - warm, well perfused. Labs  Recent Results (from the past 24 hour(s))   CBC WITH AUTOMATED DIFF    Collection Time: 09/11/20  4:52 AM   Result Value Ref Range    WBC 6.4 4.1 - 11.1 K/uL    RBC 3.69 (L) 4.10 - 5.70 M/uL    HGB 11.5 (L) 12.1 - 17.0 g/dL    HCT 35.1 (L) 36.6 - 50.3 %    MCV 95.1 80.0 - 99.0 FL    MCH 31.2 26.0 - 34.0 PG    MCHC 32.8 30.0 - 36.5 g/dL    RDW 12.6 11.5 - 14.5 %    PLATELET 104 553 - 834 K/uL    MPV 9.9 8.9 - 12.9 FL    NRBC 0.0 0  WBC    ABSOLUTE NRBC 0.00 0.00 - 0.01 K/uL    NEUTROPHILS 70 32 - 75 %    LYMPHOCYTES 11 (L) 12 - 49 %    MONOCYTES 12 5 - 13 %    EOSINOPHILS 4 0 - 7 %    BASOPHILS 1 0 - 1 %    IMMATURE GRANULOCYTES 2 (H) 0.0 - 0.5 %    ABS. NEUTROPHILS 4.4 1.8 - 8.0 K/UL    ABS.  LYMPHOCYTES 0.7 (L) 0.8 - 3.5 K/UL ABS. MONOCYTES 0.8 0.0 - 1.0 K/UL    ABS. EOSINOPHILS 0.3 0.0 - 0.4 K/UL    ABS. BASOPHILS 0.1 0.0 - 0.1 K/UL    ABS. IMM. GRANS. 0.1 (H) 0.00 - 0.04 K/UL    DF SMEAR SCANNED      RBC COMMENTS MACROCYTOSIS  1+        RBC COMMENTS NORMOCYTIC, NORMOCHROMIC     METABOLIC PANEL, COMPREHENSIVE    Collection Time: 09/11/20  4:52 AM   Result Value Ref Range    Sodium 137 136 - 145 mmol/L    Potassium 3.9 3.5 - 5.1 mmol/L    Chloride 104 97 - 108 mmol/L    CO2 27 21 - 32 mmol/L    Anion gap 6 5 - 15 mmol/L    Glucose 116 (H) 65 - 100 mg/dL    BUN 8 6 - 20 MG/DL    Creatinine 0.66 (L) 0.70 - 1.30 MG/DL    BUN/Creatinine ratio 12 12 - 20      GFR est AA >60 >60 ml/min/1.73m2    GFR est non-AA >60 >60 ml/min/1.73m2    Calcium 9.1 8.5 - 10.1 MG/DL    Bilirubin, total 0.6 0.2 - 1.0 MG/DL    ALT (SGPT) 50 12 - 78 U/L    AST (SGOT) 24 15 - 37 U/L    Alk. phosphatase 272 (H) 45 - 117 U/L    Protein, total 5.6 (L) 6.4 - 8.2 g/dL    Albumin 2.7 (L) 3.5 - 5.0 g/dL    Globulin 2.9 2.0 - 4.0 g/dL    A-G Ratio 0.9 (L) 1.1 - 2.2       MRI Results (most recent):  Results from Hospital Encounter encounter on 09/02/20   MRI ABD W MRCP W WO CONT    Narrative MRI ABDOMEN AND MRCP WITH AND WITHOUT CONTRAST. 9/3/2020 11:15 PM    INDICATION: Elevated bilirubin. History of Whipple for cholangiocarcinoma. Stricture of the pancreatic anastomosis. COMPARISON: CT abdomen pelvis 9/3/2020, 9/2/2020, 12/4/2019. TECHNIQUE: Multisequence and multiplanar MRI of the abdomen was performed after  the administration of 7 mL of Gadavist (gadobutrol)  IV contrast. Images were  obtained without contrast; and in late arterial, portal venous, and delayed  phases after the administration of contrast.     Heavily T2-weighted thick slab and thin slice images were obtained in the  oblique coronal plane through the biliary tree (MRCP). FINDINGS:   Recurrence: Abnormal soft tissue centered between the common hepatic artery and  the SMA has increased.  On 12/4/2019, the SMA and common hepatic artery where  encased in the celiac artery was abutted. It measured approximately 16 x 44 x 24  mm in greatest dimensions Evaluation at that time was compounded by superimposed  acute pancreatitis, however. On today's examination and on 9/2/2020, it measured  34 x 47 x 58 mm. This spiculated, T1 hypointense mass shows slight enhancement on delayed phases  (13-67, 10-67). It now encases the superior mesenteric artery and narrows it  from the origin over greater than 2 cm. There is poststenotic dilation of the  midportion. The celiac and common hepatic arteries are encased but not narrowed. The bilateral renal arteries are encased and more mildly narrowed. The left  renal vein is obliterated, and the left kidney drains via collaterals to the  left gonadal splenic, and lumbar veins. The aorta is abutted over 180 degrees. This represents recurrent tumor until proven otherwise. Retroperitoneal fibrosis  is a less likely possibility. Abdomen: There is heterogeneous perfusion throughout the liver on arterial  phase, with no correlate on portal venous phase, and delayed phase, or  T2-weighted images. This is more than typically seen as perfusion anomalies. It  is indeterminate, but of questionable significance. The portal and splenic veins remain dilated. No downstream narrowing. No overt  cirrhosis. No superior mesenteric vein dilation. Moderate left retroperitoneal edema is likely due to renal vein occlusion. A  trace left pleural effusion and trace left paracolic ascites are also  associated. No pancreatic or biliary duct dilation. Incidental note is made of left renal  cysts and lower thoracic nerve root sleeve cysts. The distal esophagus, stomach,  gastrojejunostomy, biliary limb, spleen, adrenals, and kidneys are otherwise  normal. Descending diverticulosis is mild. Post L5-S1 fusion. Impression IMPRESSION:   1.  Perivascular recurrence in the superior retroperitoneum extends around the  celiac, common hepatic, superior mesenteric, and bilateral renal arteries. 2. Left renal vein occlusion. Moderate consequent, left retroperitoneal edema,  trace ascites, and trace left pleural effusion. 3. Narrowing of the origin and proximal SMA. Poststenotic dilation of the  midportion. 4. More mild narrowing of the bilateral renal arteries. CT Results (most recent):  Results from Hospital Encounter encounter on 09/02/20   CT ABD PELV WO CONT    Narrative EXAM: CT ABD PELV WO CONT    INDICATION: Evaluation for renal or ureteral stones    COMPARISON: CT from the prior day    CONTRAST:  None. TECHNIQUE:   Thin axial images were obtained through the abdomen and pelvis. Coronal and  sagittal reformats were generated. Oral contrast was not administered. CT dose  reduction was achieved through use of a standardized protocol tailored for this  examination and automatic exposure control for dose modulation. The absence of intravenous contrast material reduces the sensitivity for  evaluation of the vasculature and solid organs. FINDINGS:   LOWER THORAX: No significant abnormality in the incidentally imaged lower chest.  LIVER: No mass. BILIARY TREE: Prior cholecystectomy. Mild pneumobilia. CBD is not dilated. SPLEEN: within normal limits. PANCREAS: No focal abnormality. ADRENALS: Unremarkable. KIDNEYS/URETERS: Nonobstructing 2 and 3 mm calculi. Left hydronephrosis and  hydroureter to the level of the pelvis. STOMACH: Unremarkable. SMALL BOWEL: No dilatation or wall thickening. COLON: No dilatation or wall thickening. Sigmoid diverticulosis. APPENDIX:  PERITONEUM: No ascites or pneumoperitoneum. RETROPERITONEUM: No lymphadenopathy or aortic aneurysm. REPRODUCTIVE ORGANS: Prostatomegaly with prostate calcifications  URINARY BLADDER: No mass or calculus. BONES: No destructive bone lesion. ABDOMINAL WALL: No mass or hernia.   ADDITIONAL COMMENTS: N/A Impression IMPRESSION:  Nonobstructing right renal calculus  Left hydronephrosis and hydroureter but no distal ureteral calculus  Incidental sigmoid diverticulosis  Mild prostatomegaly         Assessment     Ayse Mancinilitzy Aguirre. is a 62 y. 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 He now presents with acutely worsened abdominal pain and ongoing bilious emesis. He is s/p celiac plexus block for the ongoing back/abdominal pain and s/p laparoscopic revision of his duodenojejunostomy to a eren-en Y for the ongoing bilious emesis/bile reflux. Plan     - Ok to d/c home today from surgical perspective. Will see him back in 2 weeks for follow up. - I have recommended a low residue diet x 2 weeks.       Bryant Kerr MD  9/11/2020  07:30

## 2020-09-11 NOTE — PROGRESS NOTES
Patient sitting by the edge of the bed ,eating breakfast.Pain much much better,he is ready to go. I asked him if he needs prescription for pain medications,he did not think he need a any and has meds at home. Dr Dana Gallegos cleared him.

## 2020-09-11 NOTE — DISCHARGE INSTRUCTIONS
Discharge Instructions       PATIENT ID: Obi Forte. MRN: [id number]   YOB: 1955    DATE OF ADMISSION: 9/2/2020  7:59 AM    DATE OF DISCHARGE: 9/11/2020    PRIMARY CARE PROVIDER: Jonny Holder DO     ATTENDING PHYSICIAN: Nima Jones MD  DISCHARGING PROVIDER: Camilo Lowe MD    To contact this individual call [phone] and ask the  to page. If unavailable ask to be transferred the Adult Hospitalist Department. DISCHARGE DIAGNOSES and CARE RECOMMENDATIONS:   Abdominal pain  -You have undergone US guided  guided celiac plexus block and revision revision  Loli-en-Y 9/9/2020 with significant improvement of your pain. Dr Scott Murry would like to see you in the office in 1-2 weeks.       DIET: Regular Diet      ACTIVITY: Activity as tolerated      SERVICES and EQUIPMENT needed: own    PENDING TEST RESULTS:   At the time of discharge the following test results are still pending: none    FOLLOW UP APPOINTMENTS:   Follow-up Information     Follow up With Specialties Details Why Contact Info    Jonny Holder DO Internal Medicine Go on 10/15/2020 Hospital follow up appt has been scheduled for Oct 15 @ 10:30AM [address] [facility]  [phone]      Apolinar Rocha MD General Surgery, Surgical Oncology, Colon and Rectal Surgery In 2 weeks Call office to schedule appointment [address] [place] [address]  [phone]               YOU WERE SEEN BY THE FOLLOWING CONSULTATIONS:   IP CONSULT TO GASTROENTEROLOGY  IP CONSULT TO GASTROENTEROLOGY  IP CONSULT TO UROLOGY  IP CONSULT TO PALLIATIVE CARE - PROVIDER  IP CONSULT TO UROLOGY  IP CONSULT TO HOSPITALIST  IP CONSULT TO GENERAL SURGERY    YOU HAD THE FOLLOWING PROCEDURES/SURGERIES  :   Procedure(s):  LAPAROSCOPIC REVISION OF GASTROJEJUNOSTOMY TO LOLI-EN Y (URGENT)              DISCHARGE MEDICATIONS:   See Medication Reconciliation Form    · It is important that you take the medication exactly as they are prescribed. · Keep your medication in the bottles provided by the pharmacist and keep a list of the medication names, dosages, and times to be taken in your wallet. · Do not take other medications without consulting your doctor. NOTIFY YOUR PHYSICIAN FOR ANY OF THE FOLLOWING:   Fever over 101 degrees for 24 hours. Chest pain, shortness of breath, fever, chills, nausea, vomiting, diarrhea, change in mentation, falling, weakness, bleeding. Severe pain or pain not relieved by medications. Or, any other signs or symptoms that you may have questions about. Signed:    Paula Venegas MD  9/11/2020  9:23 AM

## 2020-09-11 NOTE — PROGRESS NOTES
Patient for discharge today, review discharge instructions, to be transported home by family. Problem: Falls - Risk of  Goal: *Absence of Falls  Description: Document Renee [place] Fall Risk and appropriate interventions in the flowsheet.   Outcome: Resolved/Met     Problem: Patient Education: Go to Patient Education Activity  Goal: Patient/Family Education  Outcome: Resolved/Met     Problem: Discharge Planning  Goal: *Discharge to safe environment  Outcome: Resolved/Met     Problem: Pain  Goal: *Control of Pain  Outcome: Resolved/Met  Goal: *PALLIATIVE CARE:  Alleviation of Pain  Outcome: Resolved/Met     Problem: Diarrhea (Adult and Pediatrics)  Goal: *Absence of diarrhea  Outcome: Resolved/Met     Problem: Patient Education: Go to Patient Education Activity  Goal: Patient/Family Education  Outcome: Resolved/Met     Problem: Nausea/Vomiting (Adult)  Goal: *Absence of nausea/vomiting  Outcome: Resolved/Met     Problem: Nutrition Deficit  Goal: *Optimize nutritional status  Outcome: Resolved/Met

## 2020-09-11 NOTE — ROUTINE PROCESS
Hospital follow-up PCP transitional care appointment has been scheduled with Dr. Wicho Escobar for Oct 15 @ 10:30AM. This was the first only appt that the provider had open on. The provider is booked 150% each day until Oct 15. Pending patient discharge. Meghan Crisostomo, Care Management Specialist. 
 
[facility] does not service the patients home address.

## 2020-09-11 NOTE — PROGRESS NOTES
Palliative Medicine Consult  Jesus: [phone] ([phone])    Patient Name: Pradeep Hinson. YOB: 1955    Date of Initial Consult: 9/4/2020  Reason for Consult: Pain management  Requesting Provider: Dr. Abe Hamilton  Primary Care Physician: Armani Ham DO     SUMMARY:   Clif Alvarez is a 62y.o. 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 His chronic back pain has been controlled with fentanyl patch 37.5 mcg every 72 hours. 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 This is second marriage for both of them. He is now admitted with intractable bilateral flank pain, biliary emesis and nausea. MRI shows renal vein thrombosis     Current hospitalization-s/p celiac plexus block. Laparoscopic reversal of Whipple procedure on 9/9/2020    Plan for discharge today. I have sent his opioid prescriptions to his pharmacy       PALLIATIVE DIAGNOSES:   1. Bilateral flank pain   2. chronic low back pain- on fentanyl 37.5 mcg patch plus tramadol 3 tablets/day  3. Chronic nausea and vomiting       PLAN:     Prescription sent per wife's request to [facility]  1. Dilaudid 2 to 4 mg every 4 hours as needed, 90 tablets for 15 days  2. Zofran 4 mg ODT every 8 hours as needed, 90 tablets  3. Creon-24,000 units, 4 capsules 3 times a day. 1. New bilateral flank pain-  MRI shows renal vein thrombosis. Much improved, question kidney stone.       2.  Chronic back pain- continue home regimen fentanyl patch 37 mcg [25 mcg patch +12 mcg patch]     3. Postoperative pain-patient needs oral Dilaudid at home. I have sent a prescription for Dilaudid 2 to 4 mg every 4 hours as needed, 90 tablets for 15 days to his pharmacy. 4. Constipation-he tends to struggle with diarrhea most of the time and then severe constipation. Important to prevent constipation with increasing opioids and flank pain. Please start Zeny-Colace 2 tablets daily and MiraLAX as needed. Okay to hold if patient is having loose stools or diarrhea. 5. Intractable nausea- On IV Zofran with good relief. .  Please call palliative medicine at [phone] should patient have uncontrolled pain.     iInitial consult note routed to primary continuity provider and/or primary health care team members  Communicated plan of care with: Palliative [facility] Team     GOALS OF CARE / TREATMENT PREFERENCES:     GOALS OF CARE:  Patient/Health Care Proxy Stated Goals: Cure    TREATMENT PREFERENCES:   Code Status: Full Code    Advance Care Planning:  [x] [facility] Interdisciplinary Team has updated the ACP Navigator with Health Care Decision Maker and Patient Capacity      Primary Decision MakerLatrice No - [phone]    Secondary Decision Maker: Ridge Cristina III - Child - [phone]    Supplemental (Other) Decision Maker: Jaki Flores Child - [phone]  Advance Care Planning 8/26/2020   Patient's Healthcare Decision Maker is: Named in scanned ACP document   Primary Decision Maker Name -   Primary Decision [address] Phone Number -   Primary Decision Maker Relationship to Patient -   Confirm Advance Directive Yes, on file   Patient Would Like to Complete Advance Directive -   Does the patient have other document types -       Medical Interventions: Full interventions     Other Instructions:   Artificially Administered Nutrition: No feeding tube     Other:    As far as possible, the palliative care team has discussed with patient / health care proxy about goals of care / treatment preferences for patient. HISTORY:     History obtained from: Patient    CHIEF COMPLAINT: Flank pain    HPI/SUBJECTIVE:    The patient is:   [x] Verbal and participatory  [] Non-participatory due to:     9/11- resting comfortably, eager for discharge    9/10-sitting up in chair, very happy the procedure went well and pain is much improved. His chronic pain is back to baseline, flank pain resolved, has some postoperative pain which is well-controlled with current regimen. Passing flatus but no bowel movement. 9/9- feeling ok, still needing IV dilaudid more than PO for acute pain control. Anxious about surgery tomorrow but wants to proceed. Patient well-known to me, seen at bedside along with his wife. He talked about the days leading to current admission. He  has been struggling with increasing back pain and upper abdominal pain with increasing vomiting over the last 2 weeks but 2 days prior to admission, he developed severe bilateral flank pain which is new for him, difficulty urination, vomiting and severe nausea. We discussed imaging results so far, he is happy that he gets dizzy in the hospital until the Whipple procedure scheduled.     Clinical Pain Assessment (nonverbal scale for severity on nonverbal patients):   Clinical Pain Assessment  Severity: 6  Location: Bilateral flank and upper abdomen and back  Character: Aching, cramping  Duration: Days  Effect: Functional and emotional  Factors: None in particular  Frequency: Constant          Duration: for how long has pt been experiencing pain (e.g., 2 days, 1 month, years)  Frequency: how often pain is an issue (e.g., several times per day, once every few days, constant)     FUNCTIONAL ASSESSMENT:     Palliative Performance Scale (PPS):  PPS: 70       PSYCHOSOCIAL/SPIRITUAL SCREENING:     Palliative IDT has assessed this patient for cultural preferences / practices and a referral made as appropriate to needs (Cultural Services, Patient Advocacy, Ethics, etc.)    Any spiritual / Restorationism concerns:  [] Yes /  [x] No    Caregiver Burnout:  [] Yes /  [x] No /  [] No Caregiver Present      Anticipatory grief assessment:   [x] Normal  / [] Maladaptive       ESAS Anxiety: Anxiety: 0    ESAS Depression: Depression: 0        REVIEW OF SYSTEMS:     Positive and pertinent negative findings in ROS are noted above in HPI. The following systems were [x] reviewed / [] unable to be reviewed as noted in HPI  Other findings are noted below. Systems: constitutional, ears/nose/mouth/throat, respiratory, gastrointestinal, genitourinary, musculoskeletal, integumentary, neurologic, psychiatric, endocrine. Positive findings noted below. Modified ESAS Completed by: provider   Fatigue: 3 Drowsiness: 0   Depression: 0 Pain: 6   Anxiety: 0 Nausea: 4   Anorexia: 0 Dyspnea: 0     Constipation: No     Stool Occurrence(s): 1        PHYSICAL EXAM:     From RN flowsheet:  Wt Readings from Last 3 Encounters:   09/09/20 145 lb (65.8 kg)   09/03/20 145 lb (65.8 kg)   07/06/20 150 lb (68 kg)     Blood pressure 112/70, pulse 73, temperature 98.5 °F (36.9 °C), resp. rate 16, height 5' 8\" (1.727 m), weight 145 lb (65.8 kg), SpO2 94 %.     Pain Scale 1: Numeric (0 - 10)  Pain Intensity 1: 4  Pain Onset 1: postop  Pain Location 1: Abdomen  Pain Orientation 1: Right, Lower  Pain Description 1: Aching  Pain Intervention(s) 1: Medication (see MAR)  Last bowel movement, if known:     Constitutional: Alert, oriented  Eyes: pupils equal, anicteric  ENMT: no nasal discharge, moist mucous membranes  Cardiovascular: regular rhythm, distal pulses intact  Respiratory: breathing not labored, symmetric  Gastrointestinal: soft,+bowel sounds, tenderness with palpation of lower abdomen  Musculoskeletal: no deformity, no tenderness to palpation  Skin: warm, dry  Neurologic: following commands, moving all extremities  Psychiatric: full affect, no hallucinations  Other:       HISTORY:     Active Problems:    Abdominal pain (12/31/2019)      Nausea & vomiting (9/2/2020)      Past Medical History:   Diagnosis Date    Arrhythmia     Previous a.fib, ablation, NSR now; NO LONGER FOLLOWED BY CARDIOLOGIST.  Autoimmune disease ([facility]     SJOGREN'S    Cancer ([facility]     COMMON BILE DUCT, ADENOCARCINOMA    Diabetes ([facility]     Family history of skin cancer     Hypertension     Sepsis ([facility] 2017    STENTING TO BILE DUCT    Status post chemotherapy     Stroke [facility]) 2008    brain aneurysm - no deficits    Sun-damaged skin     Sunburn, blistering       Past Surgical History:   Procedure Laterality Date    HX GI      COLONOSCOPY, POLYPS (BENIGN)    HX GI  10/06/2017    [facility]     [address]  2005    Ablation     HX ORTHOPAEDIC  2000    Spinal fusion W/ HARDWARE    HX OTHER SURGICAL  11/07/2017    [place] Cath, Dr. Praveen Lauren  2017    PORTACATH - then removed    NEUROLOGICAL PROCEDURE UNLISTED  2005    Blayne Hams hole washout from cerebral hemorrhage      Family History   Problem Relation Age of Onset    Cancer Father         Breast and Colon    Cancer Mother         LEUKEMIA    No Known Problems Sister     No Known Problems Brother     No Known Problems Sister     Anesth Problems Neg Hx       History reviewed, no pertinent family history.   Social History     Tobacco Use    Smoking status: Never Smoker    Smokeless tobacco: Never Used   Substance Use Topics    Alcohol use: Never     Frequency: Never     Comment: very rare     Allergies   Allergen Reactions    Shellfish Derived Anaphylaxis     Soft shell crabs    Morphine Nausea and Vomiting    Pcn [Penicillins] Other (comments)     Pt stated \"always been told PCN\"  Pt tolerated other cephalosporins in the past      Current Facility-Administered Medications   Medication Dose Route Frequency    polyethylene glycol (MIRALAX) packet 17 g  17 g Oral DAILY    lipase-protease-amylase (CREON 24,000) capsule 4 Cap  4 Cap Oral TID WITH MEALS    vitamin A (AQUASOL A) capsule 50,000 Units  50,000 Units Oral DAILY    vitamin E acetate capsule 400 Units  400 Units Oral DAILY    sodium chloride (NS) flush 5-40 mL  5-40 mL IntraVENous PRN    simethicone (MYLICON) tablet 80 mg  80 mg Oral QID PRN    pantoprazole (PROTONIX) tablet 40 mg  40 mg Oral ACB    acetaminophen (TYLENOL) tablet 650 mg  650 mg Oral Q4H PRN    prochlorperazine (COMPAZINE) with saline injection 5 mg  5 mg IntraVENous Q4H PRN    loperamide (IMODIUM) capsule 4 mg  4 mg Oral Q4H PRN    fentaNYL (DURAGESIC) 12 mcg/hr patch 1 Patch  1 Patch TransDERmal Q72H    fentaNYL (DURAGESIC) 25 mcg/hr patch 1 Patch  1 Patch TransDERmal Q72H    HYDROmorphone (DILAUDID) tablet 2-4 mg  2-4 mg Oral Q4H PRN    heparin (porcine) injection 5,000 Units  5,000 Units SubCUTAneous Q12H    HYDROmorphone (PF) (DILAUDID) injection 2 mg  2 mg IntraVENous Q3H PRN    sodium chloride (NS) flush 5-40 mL  5-40 mL IntraVENous Q8H    sodium chloride (NS) flush 5-40 mL  5-40 mL IntraVENous PRN    ondansetron (ZOFRAN) injection 4 mg  4 mg IntraVENous Q4H PRN    dutasteride (AVODART) capsule 0.5 mg  0.5 mg Oral DAILY    finasteride (PROSCAR) tablet 5 mg  5 mg Oral DAILY    tamsulosin (FLOMAX) capsule 0.4 mg  0.4 mg Oral DAILY          LAB AND IMAGING FINDINGS:     Lab Results   Component Value Date/Time    WBC 6.4 09/11/2020 04:52 AM    HGB 11.5 (L) 09/11/2020 04:52 AM    PLATELET 152 69/81/8998 04:52 AM     Lab Results   Component Value Date/Time    Sodium 137 09/11/2020 04:52 AM    Potassium 3.9 09/11/2020 04:52 AM    Chloride 104 09/11/2020 04:52 AM    CO2 27 09/11/2020 04:52 AM    BUN 8 09/11/2020 04:52 AM    Creatinine 0.66 (L) 09/11/2020 04:52 AM    Calcium 9.1 09/11/2020 04:52 AM    Magnesium 2.4 12/31/2019 07:37 AM    Phosphorus 4.0 08/18/2018 04:20 AM      Lab Results   Component Value Date/Time    Alk. phosphatase 272 (H) 09/11/2020 04:52 AM    Protein, total 5.6 (L) 09/11/2020 04:52 AM    Albumin 2.7 (L) 09/11/2020 04:52 AM    Globulin 2.9 09/11/2020 04:52 AM     Lab Results   Component Value Date/Time    INR 1.1 09/09/2020 02:15 AM    Prothrombin time 11.5 (H) 09/09/2020 02:15 AM    aPTT 29.9 09/09/2020 02:15 AM      Lab Results   Component Value Date/Time    Iron 31 (L) 01/02/2020 03:24 AM    TIBC 245 (L) 01/02/2020 03:24 AM    Iron % saturation 13 (L) 01/02/2020 03:24 AM    Ferritin 122 01/02/2020 03:24 AM      No results found for: PH, PCO2, PO2  No components found for: GLPOC   No results found for: CPK, CKMB             Total time:   Counseling / coordination time, spent as noted above:   > 50% counseling / coordination?:     Prolonged service was provided for  []30 min   []75 min in face to face time in the presence of the patient, spent as noted above. Time Start:   Time End:   Note: this can only be billed with 48063 (initial) or 82788 (follow up). If multiple start / stop times, list each separately.

## 2020-09-12 ENCOUNTER — PATIENT OUTREACH (OUTPATIENT)
Dept: OTHER | Age: 65
End: 2020-09-12

## 2020-09-12 NOTE — PROGRESS NOTES
Patient on [facility] discharge report dated 09/12/2020. Left message on voicemail. Will attempt to contact again. Need to complete post-discharge assessment.

## 2020-09-14 ENCOUNTER — PATIENT OUTREACH (OUTPATIENT)
Dept: OTHER | Age: 65
End: 2020-09-14

## 2020-09-14 NOTE — PROGRESS NOTES
Second attempt to reach patient for [facility], and discharge assessment. Discreet VM left. Will send UTR letter.

## 2020-09-14 NOTE — LETTER
Mr. Gretel Mckeon. 
[address] The [address] [place] [address] Dear  Brittany Schofield, 
 
My name is Oriana Bowling RN, Employee Care Manager for [address] [place] [address], and I have been trying to reach you. The Employee Care  is a free-of-charge, confidential service provided to our employees and their family members covered by the [employer]. Part of my job is to follow up with members who have recently been in the hospital or emergency room, to help them coordinate their care and answer questions they may have about their visit. I am able to provide assistance with medication questions, scheduling needed follow-up appointments, and arranging services like home health or home medical equipment. I can also provide education regarding your hospital or ER visit as well as your medical conditions. As healthcare providers, we know that patients do better when they have close follow up with a primary care provider (PCP), especially after a hospital or emergency department visit. If you do not have a PCP, I can help you find one that is convenient to you and covered by your insurance. I can also help you understand any after visit instructions, such as what symptoms to watch out for, or any new or changed medications. Remember that you can access your After Visit Summary by logging into your [employer] account. If you do not have a [employer] account, I can help you request access. Our program is designed to provide you with the opportunity to have a [address] [place] [address] care manager partner with you for your healthcare needs. Please contact me at the below number if I can provide you with assistance for any of the above services. Sincerely, Oriana Bowling RN, BSN, [facility] [address]  [email or web address]

## 2020-09-15 ENCOUNTER — TELEPHONE (OUTPATIENT)
Dept: PALLATIVE CARE | Age: 65
End: 2020-09-15

## 2020-09-15 NOTE — TELEPHONE ENCOUNTER
Palliative Medicine - RN Transitions of Care Note      Hospital Admitted To: [facility]  Reason for Hospitalization:   Admission Date: 9/2/20  Discharge Date: 9/11/20     RRAT score:   Medical History:     Past Medical History:   Diagnosis Date    Arrhythmia     Previous a.fib, ablation, NSR now; NO LONGER FOLLOWED BY CARDIOLOGIST.  Autoimmune disease ([facility]     SJOGREN'S    Cancer ([facility]     COMMON BILE DUCT, ADENOCARCINOMA    Diabetes ([facility]     Family history of skin cancer     Hypertension     Sepsis ([facility] 2017    STENTING TO BILE DUCT    Status post chemotherapy     Stroke [facility]) 2008    brain aneurysm - no deficits    Sun-damaged skin     Sunburn, blistering        This represents Transitions of Care b/c NN spoke with patient and/or caregiver within 2 business days of discharge. Pt's TCM follow up appt is scheduled with Dr. Eunice Castro on Wednesday  @ 3:30pm  which is within 11 days of discharge. Called patient on 9/15/20 and verified with 2 identifiers. He shared that he is doing \"good\" since discharge. His pain to his right side where the laparoscopic incision is and it is tender to touch, but feels that is to be expected, and his low back may ache. He is on Fentanyl 37.5mcg every 3 days and has Dilaudid 4mg that he takes rarely, maybe 1 a day and only 1 tab at a marce. He did not take any yesterday. He has been taking Tramadol for more mild pain in the afternoon and evenings. He rates his pain at it's worst at 4/10 and states that the medications help bring his level down to almost 0 at times. He shared that he could take medication every 6 hours, but likes to wait until the pain is close to 4/10. He states that he thought he passed a kidney stone today. The pain started yesterday to his low back on the right side and he could feel it move along down towards his groin throughout the night and today. He has experienced this before on the left side, but this wasn't as bad, he said.   He did not see the stone pass, but he no longer has pain. He did not experience any bleeding with urination. He shared that for the first time in a year he has been able to sleep in his bed at night as his pain is well managed. He was very appreciative for Dr. Jose Du assistance with that. Patient presenting symptoms Acute onset abdominal pain, abnormal LFT's    Course of current Hospitalization (referenced by Dr. Charity Benton note):   [address]:   Patient reports that he started experiencing abdominal pain associated with nausea, vomiting that started about 6 days back.  Patient reports that for the past few days the pain has been getting worse, he has been having increased nausea, multiple bouts of vomiting, poor appetite, not able to tolerate p.o.  Patient reports that he thinks he has another bout of pancreatitis.  Patient reports that he has been taking oxycodone, tramadol, fentanyl patch and antiemetics at home but they have not been working. Dorie Tirado reports that today he got concerned because he was not able to tolerate p.o., his epigastric pain persisted and he decided to come to the hospital patient was found to have mildly elevated bilirubin and was requested to be admitted under the hospitalist service. Diagnosed with Cholangiocarcinoma, Hydronephrosis, Abnormal LFT's.  Admitted to Hospitalist Service      CONSULTATIONS: IP CONSULT TO GASTROENTEROLOGY  IP CONSULT TO GASTROENTEROLOGY  IP CONSULT TO UROLOGY  IP CONSULT TO PALLIATIVE CARE - PROVIDER  IP CONSULT TO UROLOGY  IP CONSULT TO HOSPITALIST  IP CONSULT TO GENERAL SURGERY     Consults recommended:  LAPAROSCOPIC REVISION OF GASTROJEJUNOSTOMY TO LOLI-EN Y (URGENT)    Significant Lab Findings:  Lab Results   Component Value Date/Time    WBC 6.4 09/11/2020 04:52 AM    HGB 11.5 (L) 09/11/2020 04:52 AM    PLATELET 082 16/94/2275 04:52 AM     Lab Results   Component Value Date/Time    Sodium 137 09/11/2020 04:52 AM Potassium 3.9 09/11/2020 04:52 AM    Chloride 104 09/11/2020 04:52 AM    CO2 27 09/11/2020 04:52 AM    BUN 8 09/11/2020 04:52 AM    Creatinine 0.66 (L) 09/11/2020 04:52 AM    Calcium 9.1 09/11/2020 04:52 AM    Magnesium 2.4 12/31/2019 07:37 AM    Phosphorus 4.0 08/18/2018 04:20 AM      Lab Results   Component Value Date/Time    Alk. phosphatase 272 (H) 09/11/2020 04:52 AM    Protein, total 5.6 (L) 09/11/2020 04:52 AM    Albumin 2.7 (L) 09/11/2020 04:52 AM    Globulin 2.9 09/11/2020 04:52 AM     Lab Results   Component Value Date/Time    INR 1.1 09/09/2020 02:15 AM    Prothrombin time 11.5 (H) 09/09/2020 02:15 AM    aPTT 29.9 09/09/2020 02:15 AM      Lab Results   Component Value Date/Time    Iron 31 (L) 01/02/2020 03:24 AM    TIBC 245 (L) 01/02/2020 03:24 AM    Iron % saturation 13 (L) 01/02/2020 03:24 AM    Ferritin 122 01/02/2020 03:24 AM        Medication Reconciliation completed: Yes New medications at discharge include Vit A 5 times daily for 14 days. Prescription Medication total: 21    If multiple admissions, ED visits, check and reviewed :      Barriers to care? None     Support System consists of:     Previous Use of [facility], N/A    Advance Medical Directive on file in EMR? Yes    Future Plan for Patient to include communication plan: Palliative appt on 9/22/20 at 3:30    Adherence to previous treatment and likelihood for f/u: Positive    Past Hospitalizations/ED visits last 12 months? 4 hospitalizations and 3 ED visits.

## 2020-09-20 ENCOUNTER — NURSE TRIAGE (OUTPATIENT)
Dept: OTHER | Facility: CLINIC | Age: 65
End: 2020-09-20

## 2020-09-20 ENCOUNTER — HOSPITAL ENCOUNTER (EMERGENCY)
Age: 65
Discharge: HOME | End: 2020-09-20
Attending: EMERGENCY MEDICINE
Payer: COMMERCIAL

## 2020-09-20 ENCOUNTER — APPOINTMENT (OUTPATIENT)
Dept: CT IMAGING | Age: 65
End: 2020-09-20
Attending: NURSE PRACTITIONER
Payer: COMMERCIAL

## 2020-09-20 VITALS
RESPIRATION RATE: 18 BRPM | SYSTOLIC BLOOD PRESSURE: 114 MMHG | TEMPERATURE: 98.2 F | OXYGEN SATURATION: 98 % | DIASTOLIC BLOOD PRESSURE: 77 MMHG | HEART RATE: 62 BPM

## 2020-09-20 DIAGNOSIS — N23 RENAL COLIC: Primary | ICD-10-CM

## 2020-09-20 LAB
ALBUMIN SERPL-MCNC: 3.8 G/DL (ref 3.5–5)
ALBUMIN/GLOB SERPL: 1.2 {RATIO} (ref 1.1–2.2)
ALP SERPL-CCNC: 296 U/L (ref 45–117)
ALT SERPL-CCNC: 75 U/L (ref 12–78)
ANION GAP SERPL CALC-SCNC: 4 MMOL/L (ref 5–15)
APPEARANCE UR: CLEAR
AST SERPL-CCNC: 21 U/L (ref 15–37)
BACTERIA URNS QL MICRO: NEGATIVE /HPF
BASOPHILS # BLD: 0.1 K/UL (ref 0–0.1)
BASOPHILS NFR BLD: 1 % (ref 0–1)
BILIRUB SERPL-MCNC: 1.3 MG/DL (ref 0.2–1)
BILIRUB UR QL CFM: NEGATIVE
BUN SERPL-MCNC: 23 MG/DL (ref 6–20)
BUN/CREAT SERPL: 23 (ref 12–20)
CALCIUM SERPL-MCNC: 9.6 MG/DL (ref 8.5–10.1)
CHLORIDE SERPL-SCNC: 99 MMOL/L (ref 97–108)
CO2 SERPL-SCNC: 29 MMOL/L (ref 21–32)
COLOR UR: ABNORMAL
COMMENT, HOLDF: NORMAL
CREAT SERPL-MCNC: 0.99 MG/DL (ref 0.7–1.3)
DIFFERENTIAL METHOD BLD: ABNORMAL
EOSINOPHIL # BLD: 0.4 K/UL (ref 0–0.4)
EOSINOPHIL NFR BLD: 4 % (ref 0–7)
EPITH CASTS URNS QL MICRO: ABNORMAL /LPF
ERYTHROCYTE [DISTWIDTH] IN BLOOD BY AUTOMATED COUNT: 13.2 % (ref 11.5–14.5)
GLOBULIN SER CALC-MCNC: 3.3 G/DL (ref 2–4)
GLUCOSE SERPL-MCNC: 143 MG/DL (ref 65–100)
GLUCOSE UR STRIP.AUTO-MCNC: NEGATIVE MG/DL
HCT VFR BLD AUTO: 44 % (ref 36.6–50.3)
HGB BLD-MCNC: 14.7 G/DL (ref 12.1–17)
HGB UR QL STRIP: NEGATIVE
IMM GRANULOCYTES # BLD AUTO: 0.2 K/UL (ref 0–0.04)
IMM GRANULOCYTES NFR BLD AUTO: 2 % (ref 0–0.5)
KETONES UR QL STRIP.AUTO: NEGATIVE MG/DL
LEUKOCYTE ESTERASE UR QL STRIP.AUTO: ABNORMAL
LYMPHOCYTES # BLD: 0.9 K/UL (ref 0.8–3.5)
LYMPHOCYTES NFR BLD: 9 % (ref 12–49)
MCH RBC QN AUTO: 31.2 PG (ref 26–34)
MCHC RBC AUTO-ENTMCNC: 33.4 G/DL (ref 30–36.5)
MCV RBC AUTO: 93.4 FL (ref 80–99)
MONOCYTES # BLD: 0.5 K/UL (ref 0–1)
MONOCYTES NFR BLD: 5 % (ref 5–13)
MUCOUS THREADS URNS QL MICRO: ABNORMAL /LPF
NEUTS SEG # BLD: 7.9 K/UL (ref 1.8–8)
NEUTS SEG NFR BLD: 80 % (ref 32–75)
NITRITE UR QL STRIP.AUTO: NEGATIVE
NRBC # BLD: 0 K/UL (ref 0–0.01)
NRBC BLD-RTO: 0 PER 100 WBC
PH UR STRIP: 5.5 [PH] (ref 5–8)
PLATELET # BLD AUTO: 277 K/UL (ref 150–400)
PMV BLD AUTO: 9.9 FL (ref 8.9–12.9)
POTASSIUM SERPL-SCNC: 4.2 MMOL/L (ref 3.5–5.1)
PROT SERPL-MCNC: 7.1 G/DL (ref 6.4–8.2)
PROT UR STRIP-MCNC: 30 MG/DL
RBC # BLD AUTO: 4.71 M/UL (ref 4.1–5.7)
RBC #/AREA URNS HPF: ABNORMAL /HPF (ref 0–5)
SAMPLES BEING HELD,HOLD: NORMAL
SODIUM SERPL-SCNC: 132 MMOL/L (ref 136–145)
SP GR UR REFRACTOMETRY: >1.03 (ref 1–1.03)
UR CULT HOLD, URHOLD: NORMAL
UROBILINOGEN UR QL STRIP.AUTO: 1 EU/DL (ref 0.2–1)
WBC # BLD AUTO: 9.9 K/UL (ref 4.1–11.1)
WBC URNS QL MICRO: ABNORMAL /HPF (ref 0–4)

## 2020-09-20 PROCEDURE — 96361 HYDRATE IV INFUSION ADD-ON: CPT

## 2020-09-20 PROCEDURE — 81001 URINALYSIS AUTO W/SCOPE: CPT

## 2020-09-20 PROCEDURE — 99283 EMERGENCY DEPT VISIT LOW MDM: CPT

## 2020-09-20 PROCEDURE — 74011250637 HC RX REV CODE- 250/637: Performed by: NURSE PRACTITIONER

## 2020-09-20 PROCEDURE — 36415 COLL VENOUS BLD VENIPUNCTURE: CPT

## 2020-09-20 PROCEDURE — 96374 THER/PROPH/DIAG INJ IV PUSH: CPT

## 2020-09-20 PROCEDURE — 80053 COMPREHEN METABOLIC PANEL: CPT

## 2020-09-20 PROCEDURE — 74176 CT ABD & PELVIS W/O CONTRAST: CPT

## 2020-09-20 PROCEDURE — 85025 COMPLETE CBC W/AUTO DIFF WBC: CPT

## 2020-09-20 PROCEDURE — 74011250636 HC RX REV CODE- 250/636: Performed by: NURSE PRACTITIONER

## 2020-09-20 RX ORDER — OXYCODONE AND ACETAMINOPHEN 5; 325 MG/1; MG/1
1 TABLET ORAL
Qty: 6 TAB | Refills: 0 | Status: SHIPPED | OUTPATIENT
Start: 2020-09-20 | End: 2020-09-22 | Stop reason: SDUPTHER

## 2020-09-20 RX ORDER — OXYCODONE AND ACETAMINOPHEN 5; 325 MG/1; MG/1
1 TABLET ORAL
Status: COMPLETED | OUTPATIENT
Start: 2020-09-20 | End: 2020-09-20

## 2020-09-20 RX ORDER — TAMSULOSIN HYDROCHLORIDE 0.4 MG/1
0.4 CAPSULE ORAL
Status: DISCONTINUED | OUTPATIENT
Start: 2020-09-20 | End: 2020-09-20

## 2020-09-20 RX ORDER — PROMETHAZINE HYDROCHLORIDE 25 MG/1
25 TABLET ORAL
Qty: 30 TAB | Refills: 0 | Status: SHIPPED | OUTPATIENT
Start: 2020-09-20 | End: 2020-10-26

## 2020-09-20 RX ORDER — SODIUM CHLORIDE, SODIUM LACTATE, POTASSIUM CHLORIDE, CALCIUM CHLORIDE 600; 310; 30; 20 MG/100ML; MG/100ML; MG/100ML; MG/100ML
1000 INJECTION, SOLUTION INTRAVENOUS CONTINUOUS
Status: DISCONTINUED | OUTPATIENT
Start: 2020-09-20 | End: 2020-09-20

## 2020-09-20 RX ORDER — ONDANSETRON 2 MG/ML
8 INJECTION INTRAMUSCULAR; INTRAVENOUS
Status: COMPLETED | OUTPATIENT
Start: 2020-09-20 | End: 2020-09-20

## 2020-09-20 RX ADMIN — OXYCODONE HYDROCHLORIDE AND ACETAMINOPHEN 1 TABLET: 5; 325 TABLET ORAL at 14:58

## 2020-09-20 RX ADMIN — SODIUM CHLORIDE 1000 ML: 9 INJECTION, SOLUTION INTRAVENOUS at 15:04

## 2020-09-20 RX ADMIN — ONDANSETRON 8 MG: 2 INJECTION INTRAMUSCULAR; INTRAVENOUS at 14:58

## 2020-09-20 NOTE — ED TRIAGE NOTES
Pt arrives ambulatory from home with a CC of RIGHT lower flank pain that started since last Monday. Pt states he vomited last night. Pt states he's passed a kidney stone in the past and this feels the same. Pt states that he is dehydrated and has lost 12 pounds since last night. Perla Piedra

## 2020-09-20 NOTE — ED PROVIDER NOTES
This is a 56-year-old male with a past medical history of A. fib s/p ablation, Sjogren's, adenocarcinoma of the common bile duct, diabetes, and hypertension who presents the ER today for chief complaint of right flank pain, difficulty urinating, and dehydration. Patient states that since Monday of this past week he has had symptoms typical of his prior kidney stones - which include right flank pain radiating towards right lower quadrant of abdomen and groin, also with nausea and vomiting. He states that he has tried to take Zofran and Compazine at home, with no relief in his symptoms. He also reports decreased urinary output and increased urinary hesitancy starting yesterday (attempting to pee with very little urinary output). He states that he has passed approximately 3 kidney stones in his lifetime, all of which he was able to pass spontaneously without surgical intervention. ROS negative for: Hematuria, fever/chills, dysuria, abdominal pain, lightheadedness/dizziness, abdominal distention           Past Medical History:   Diagnosis Date    Arrhythmia     Previous a.fib, ablation, NSR now; NO LONGER FOLLOWED BY CARDIOLOGIST.     Autoimmune disease ([address]     SJOGREN'S    Cancer ([address]     COMMON BILE DUCT, ADENOCARCINOMA    Diabetes ([address]     Family history of skin cancer     Hypertension     Sepsis ([address] 2017    STENTING TO BILE DUCT    Status post chemotherapy     Stroke [facility]) 2008    brain aneurysm - no deficits    Sun-damaged skin     Sunburn, blistering        Past Surgical History:   Procedure Laterality Date    HX GI      COLONOSCOPY, POLYPS (BENIGN)    HX GI  10/06/2017    Viktor Padgett [address] [place] [address]  2005    Ablation     HX ORTHOPAEDIC  2000    Spinal fusion W/ HARDWARE    HX OTHER SURGICAL  11/07/2017    [place] Cath, Dr. Silviano Andrade  2017    PORTACATH - then removed    NEUROLOGICAL PROCEDURE UNLISTED  2005    Ting hole washout from cerebral hemorrhage         Family History:   Problem Relation Age of Onset    Cancer Father         Breast and Colon    Cancer Mother         LEUKEMIA    No Known Problems Sister     No Known Problems Brother     No Known Problems Sister     Anesth Problems Neg Hx        Social History     Socioeconomic History    Marital status:      Spouse name: Not on file    Number of children: Not on file    Years of education: Not on file    Highest education level: Not on file   Occupational History    Not on file   Social Needs    Financial resource strain: Not on file    Food insecurity     Worry: Not on file     Inability: Not on file    Transportation needs     Medical: Not on file     Non-medical: Not on file   Tobacco Use    Smoking status: Never Smoker    Smokeless tobacco: Never Used   Substance and Sexual Activity    Alcohol use: Never     Frequency: Never     Comment: very rare    Drug use: No    Sexual activity: Yes     Partners: Female   Lifestyle    Physical activity     Days per week: Not on file     Minutes per session: Not on file    Stress: Not on file   Relationships    Social connections     Talks on phone: Not on file     Gets together: Not on file     Attends Taoist service: Not on file     Active member of club or organization: Not on file     Attends meetings of clubs or organizations: Not on file     Relationship status: Not on file    Intimate partner violence     Fear of current or ex partner: Not on file     Emotionally abused: Not on file     Physically abused: Not on file     Forced sexual activity: Not on file   Other Topics Concern    Not on file   Social History Narrative    Not on file         ALLERGIES: Shellfish derived; Morphine; and Pcn [penicillins]    Review of Systems   Constitutional: Negative for fever. HENT: Negative for sore throat. Eyes: Negative for visual disturbance. Respiratory: Negative for shortness of breath.     Cardiovascular: Negative for palpitations. Gastrointestinal: Positive for nausea and vomiting. Genitourinary: Positive for decreased urine volume, difficulty urinating and flank pain. Negative for dysuria and hematuria. Musculoskeletal: Negative for myalgias. Skin: Negative for rash. Neurological: Negative for dizziness. Psychiatric/Behavioral: Negative for dysphoric mood. Vitals:    09/20/20 1402   BP: 98/69   Pulse: 79   Resp: 16   Temp: 98.1 °F (36.7 °C)   SpO2: 97%            Physical Exam  Vitals signs and nursing note reviewed. Constitutional:       General: He is not in acute distress. Appearance: Normal appearance. He is not ill-appearing. HENT:      Head: Normocephalic and atraumatic. Nose: Nose normal.      Mouth/Throat:      Mouth: Mucous membranes are dry. Pharynx: Oropharynx is clear. Eyes:      Extraocular Movements: Extraocular movements intact. Conjunctiva/sclera: Conjunctivae normal.      Pupils: Pupils are equal, round, and reactive to light. Neck:      Musculoskeletal: Normal range of motion and neck supple. Cardiovascular:      Rate and Rhythm: Normal rate and regular rhythm. Pulses: Normal pulses. Radial pulses are 2+ on the right side and 2+ on the left side. Heart sounds: Murmur present. Pulmonary:      Effort: Pulmonary effort is normal.      Breath sounds: Normal breath sounds. Abdominal:      General: Abdomen is flat. Bowel sounds are normal.      Palpations: Abdomen is soft. Tenderness: There is no right CVA tenderness or left CVA tenderness. Musculoskeletal: Normal range of motion. Skin:     General: Skin is warm and dry. Comments: Poor skin turgor and dry mucous membranes   Neurological:      Mental Status: He is alert and oriented to person, place, and time. Mental status is at baseline.    Psychiatric:         Mood and Affect: Mood normal.         Behavior: Behavior normal.          MDM  Number of Diagnoses or Management Options  Renal colic:      Amount and/or Complexity of Data Reviewed  Clinical lab tests: ordered and reviewed  Tests in the medicine section of CPT®: ordered and reviewed    Risk of Complications, Morbidity, and/or Mortality  Presenting problems: moderate  Diagnostic procedures: moderate  Management options: moderate          VITAL SIGNS:  Patient Vitals for the past 4 hrs:   Temp Pulse Resp BP SpO2   09/20/20 1805 98.2 °F (36.8 °C) 62 18 114/77 98 %         LABS:  Recent Results (from the past 6 hour(s))   CBC WITH AUTOMATED DIFF    Collection Time: 09/20/20  2:28 PM   Result Value Ref Range    WBC 9.9 4.1 - 11.1 K/uL    RBC 4.71 4.10 - 5.70 M/uL    HGB 14.7 12.1 - 17.0 g/dL    HCT 44.0 36.6 - 50.3 %    MCV 93.4 80.0 - 99.0 FL    MCH 31.2 26.0 - 34.0 PG    MCHC 33.4 30.0 - 36.5 g/dL    RDW 13.2 11.5 - 14.5 %    PLATELET 461 185 - 585 K/uL    MPV 9.9 8.9 - 12.9 FL    NRBC 0.0 0  WBC    ABSOLUTE NRBC 0.00 0.00 - 0.01 K/uL    NEUTROPHILS 80 (H) 32 - 75 %    LYMPHOCYTES 9 (L) 12 - 49 %    MONOCYTES 5 5 - 13 %    EOSINOPHILS 4 0 - 7 %    BASOPHILS 1 0 - 1 %    IMMATURE GRANULOCYTES 2 (H) 0.0 - 0.5 %    ABS. NEUTROPHILS 7.9 1.8 - 8.0 K/UL    ABS. LYMPHOCYTES 0.9 0.8 - 3.5 K/UL    ABS. MONOCYTES 0.5 0.0 - 1.0 K/UL    ABS. EOSINOPHILS 0.4 0.0 - 0.4 K/UL    ABS. BASOPHILS 0.1 0.0 - 0.1 K/UL    ABS. IMM.  GRANS. 0.2 (H) 0.00 - 0.04 K/UL    DF AUTOMATED     METABOLIC PANEL, COMPREHENSIVE    Collection Time: 09/20/20  2:28 PM   Result Value Ref Range    Sodium 132 (L) 136 - 145 mmol/L    Potassium 4.2 3.5 - 5.1 mmol/L    Chloride 99 97 - 108 mmol/L    CO2 29 21 - 32 mmol/L    Anion gap 4 (L) 5 - 15 mmol/L    Glucose 143 (H) 65 - 100 mg/dL    BUN 23 (H) 6 - 20 MG/DL    Creatinine 0.99 0.70 - 1.30 MG/DL    BUN/Creatinine ratio 23 (H) 12 - 20      GFR est AA >60 >60 ml/min/1.73m2    GFR est non-AA >60 >60 ml/min/1.73m2    Calcium 9.6 8.5 - 10.1 MG/DL    Bilirubin, total 1.3 (H) 0.2 - 1.0 MG/DL    ALT (SGPT) 75 12 - 78 U/L AST (SGOT) 21 15 - 37 U/L    Alk. phosphatase 296 (H) 45 - 117 U/L    Protein, total 7.1 6.4 - 8.2 g/dL    Albumin 3.8 3.5 - 5.0 g/dL    Globulin 3.3 2.0 - 4.0 g/dL    A-G Ratio 1.2 1.1 - 2.2     SAMPLES BEING HELD    Collection Time: 09/20/20  2:28 PM   Result Value Ref Range    SAMPLES BEING HELD [id number]     COMMENT        Add-on orders for these samples will be processed based on acceptable specimen integrity and analyte stability, which may vary by analyte. URINALYSIS W/MICROSCOPIC    Collection Time: 09/20/20  3:08 PM   Result Value Ref Range    Color DARK YELLOW      Appearance CLEAR CLEAR      Specific gravity >1.030 (H) 1.003 - 1.030    pH (UA) 5.5 5.0 - 8.0      Protein 30 (A) NEG mg/dL    Glucose Negative NEG mg/dL    Ketone Negative NEG mg/dL    Blood Negative NEG      Urobilinogen 1.0 0.2 - 1.0 EU/dL    Nitrites Negative NEG      Leukocyte Esterase TRACE (A) NEG      WBC 0-4 0 - 4 /hpf    RBC 0-5 0 - 5 /hpf    Epithelial cells FEW FEW /lpf    Bacteria Negative NEG /hpf    Mucus 1+ (A) NEG /lpf   URINE CULTURE HOLD SAMPLE    Collection Time: 09/20/20  3:08 PM    Specimen: Serum; Urine   Result Value Ref Range    Urine culture hold        Urine on hold in Microbiology dept for 2 days. If unpreserved urine is submitted, it cannot be used for addtional testing after 24 hours, recollection will be required. BILIRUBIN, CONFIRM    Collection Time: 09/20/20  3:08 PM   Result Value Ref Range    Bilirubin UA, confirm Negative NEG          IMAGING:  CT ABD PELV WO CONT   Final Result   IMPRESSION:   1. No urinary tract stone or hydronephrosis. 2. Single mildly dilated loop of mid abdominal small bowel, nonspecific.             Medications During Visit:  Medications   ondansetron (ZOFRAN) injection 8 mg (8 mg IntraVENous Given 9/20/20 1458)   oxyCODONE-acetaminophen (PERCOCET) 5-325 mg per tablet 1 Tab (1 Tab Oral Given 9/20/20 1458)   sodium chloride 0.9 % bolus infusion 1,000 mL (0 mL IntraVENous IV Completed 9/20/20 1742)   sodium chloride 0.9 % bolus infusion 1,000 mL (0 mL IntraVENous IV Completed 9/20/20 1742)         DECISION MAKING:  Keisha Edwards is a 62 y.o. male who comes in as above.   4:23 PM  Patient reports significant improvement in n/v and pain at this time. Post void residual showed very little urine in bladder. Obstruction ruled out. CBC unremarkable  Metabolic panel reveals mild hyponatremia and stable LFT elevation compared to recent labs. Urinalysis remarkable for high concentration, mild proteinuria, and trace leukocyte Estrace. No concerns for infection. No hematuria. CT scan reveals no acute abnormalities suggestive of hydronephrosis, hydroureter, or nephrolithiasis. No evidence of SBO. Patient's  CT scans and earlier this month did reveal several urinary calculi, and I suspect that the patient's pain over the last several days were due to him passing existing stones. Patient able to tolerate food and fluids without difficulty after receiving 8 mg of IV Zofran. He was encouraged to take Phenergan and Percocet as needed at home and to follow-up promptly with his primary care team and urologist.  Vital signs stable. Well-appearing and euvolemic at time of discharge. Above results discussed and reviewed with the patient. Patient verbalized understanding of the care plan, including any changes to current outpatient medication regimen, discussed disease process, symptom control, and follow-up care. IMPRESSION:  1. Renal colic        DISPOSITION:  Discharged    Discharge Medication List as of 9/20/2020  5:59 PM      START taking these medications    Details   promethazine (PHENERGAN) 25 mg tablet Take 1 Tab by mouth every six (6) hours as needed for Nausea., Normal, Disp-30 Tab,R-0      oxyCODONE-acetaminophen (Percocet) 5-325 mg per tablet Take 1 Tab by mouth every six (6) hours as needed for Pain for up to 3 days.  Max Daily Amount: 4 Tabs., Normal, Disp-6 Tab,R-0 CONTINUE these medications which have NOT CHANGED    Details   vitamin A (AQUASOL A) 10,000 unit capsule Take 5 Caps by mouth daily for 14 days, THEN 2 Caps daily for 14 days. , Print, Disp-98 Cap,R-0      vitamin E, dl,tocopheryl acet, (vitamin E acetate) 400 unit cap capsule Take 1 Cap by mouth daily for 10 days. , Print, Disp-30 Cap,R-0      HYDROmorphone (DILAUDID) 4 mg tablet Take 0.5-1 Tabs by mouth every four (4) hours as needed for Pain for up to 15 days. Max Daily Amount: 24 mg., Normal, Disp-90 Tab,R-0      lipase-protease-amylase (Creon) 24,000-76,000 -120,000 unit capsule Take 4 Caps by mouth three (3) times daily (with meals). , Normal, Disp-180 Cap,R-1      !! ondansetron (ZOFRAN ODT) 4 mg disintegrating tablet Take 1 Tab by mouth every eight (8) hours as needed for Nausea or Vomiting., Normal, Disp-90 Tab,R-0      loperamide (IMODIUM) 2 mg capsule Take 2-4 mg by mouth as needed for Diarrhea. Give 4 mg after first loose stool. Give an additional 2 mg after each loose stool as needed up to a maximum of 8 doses (16 mg/day). , Historical Med      !! lipase-protease-amylase (Creon) 36,000-114,000- 180,000 unit cpDR capsule Take 1 Cap by mouth as needed (for snacks). 2-3 caps each meal and 1 cap for snacks (see additional order), Historical Med      dutasteride (AVODART) 0.5 mg capsule Take 0.5 mg by mouth daily. , Historical Med      traMADoL (ULTRAM) 50 mg tablet Take 1 Tab by mouth every six (6) hours as needed for Pain for up to 30 days. Max Daily Amount: 200 mg., Normal, Disp-60 Tab,R-3Not to exceed 5 additional fills before 09/13/2020. gabapentin (NEURONTIN) 100 mg capsule Take 3 capsules by mouth twice a day., Normal, Disp-540 Cap,R-3      fentaNYL 37.5 mcg/hour pt72 37.5 mcg by TransDERmal route every seventy-two (72) hours for 30 days. Max Daily Amount: 37.5 mcg., Normal, Disp-10 Patch,R-0      empagliflozin (Jardiance) 25 mg tablet Take 1 Tab by mouth daily. , Normal, Disp-90 Tab,R-3 cyanocobalamin (VITAMIN B12) 1,000 mcg/mL injection INJECT CONTENTS OF ONE VIAL INTRAMUSCULARLY EVERY OTHER WEEK, No Print, Disp-6 mL,R-6      !! Creon 36,000-114,000- 180,000 unit cpDR capsule Take 2-3 Caps by mouth three (3) times daily (with meals). 2-3 caps each meal and 1 cap for snacks (see additional order), Historical Med, PREETI      finasteride (PROSCAR) 5 mg tablet TAKE 1 TABLET BY MOUTH EVERY DAY, Historical Med      ramipriL (ALTACE) 10 mg capsule TAKE 1 CAPSULE BY MOUTH EVERY DAY AT NIGHT, Normal, Disp-90 Cap, R-3      !! ondansetron (ZOFRAN ODT) 4 mg disintegrating tablet Take 1 Tab by mouth every eight (8) hours as needed for Nausea., Normal, Disp-30 Tab, R-3      acetaminophen (TYLENOL) 325 mg tablet Take 2 Tabs by mouth every four (4) hours as needed for Pain or Fever (Over the counter medication). , Normal, Disp-1 Tab, R-0Over the counter medication      sucralfate (CARAFATE) 1 gram tablet Take 1 g by mouth four (4) times daily. , Historical Med      omeprazole (PRILOSEC) 40 mg capsule Take 1 Cap by mouth daily. , Normal, Disp-30 Cap, R-0      sildenafil citrate (VIAGRA) 50 mg tablet Take 1 Tab by mouth as needed (ED)., Normal, Disp-30 Tab, R-1      tamsulosin (FLOMAX) 0.4 mg capsule TAKE ONE CAPSULE BY MOUTH EVERY EVENING, Historical Med, R-0      alpha-D-galactosidase (BEANO PO) Take 1 Tab by mouth two (2) times a day., Historical Med      cetirizine (ZYRTEC) 10 mg tablet Take 10 mg by mouth nightly., Historical Med       !! - Potential duplicate medications found. Please discuss with provider. Follow-up Information     Follow up With Specialties Details Why Contact Edwin Echevarria, DO Internal Medicine  As needed, If symptoms worsen [address]  [phone]      Your urologist                The patient is asked to follow-up with their primary care provider in the next several days.   They are to call tomorrow for an appointment. The patient is asked to return promptly for any increased concerns or worsening of symptoms. They can return to this emergency department or any other emergency department.

## 2020-09-20 NOTE — DISCHARGE INSTRUCTIONS
[facility] Kidney Stone Hotline    The Kid[facility] Hotline is a resource to assist you when experiencing the symptoms of a kidney stone. Call the [facility] hotline at 221-101-PIWM(e). When you call this number, a staff member will coordinate appropriate care by either scheduling you a timely appointment at our office or directing you to immediate care, as needed. The CT scan today did not show any evidence of urinary tract stones, so it is possible that you passed the 2 that were observed on your prior CT scan.   Please follow-up promptly with your primary care physician and urologist

## 2020-09-20 NOTE — TELEPHONE ENCOUNTER
Wife calls for patient. They are in the ED d/t dehydration, kidney stone, common bile duct cancer. He has lost 10 lbs recent. Provider feels he is passing another kidney stone. He recently had a Whipple revision surgery on 9/9/2020. They have seen an ED provider before call and admission pending. Reason for Disposition   Caller has already spoken with the PCP and has no further questions.     Protocols used: NO CONTACT OR DUPLICATE CONTACT CALL-ADULT-

## 2020-09-21 ENCOUNTER — TELEPHONE (OUTPATIENT)
Dept: PALLATIVE CARE | Age: 65
End: 2020-09-21

## 2020-09-21 NOTE — TELEPHONE ENCOUNTER
Palliative Medicine  Nursing Note  [phone]  Fax [phone]      ED Follow up Call  Patient Name: Antonio Bush. YOB: [birth year]         Primary Decision Maker: Asher De La Torre - Spouse - [phone]    Secondary Decision Maker: Ridge Cristina III - Child - [phone]    Supplemental (Other) Decision Maker: Valeria Brunson Child - [phone]   Advance Care Planning 8/26/2020   Patient's Healthcare Decision Maker is: Named in scanned ACP document   Primary Decision Maker Name -   Primary Decision Maker Phone Number -   Primary Decision Maker Relationship to Patient -   Confirm Advance Directive Yes, on file   Patient Would Like to Complete Advance Directive -   Does the patient have other document types -       On September 20, 2020, patient presented to the ED for acute onset of right flank pain, difficulty urinating, and dehydration. Patient had labs and CT scan of abdomen and pelvis, which showed no acute abnormalities suggestive of hydronephrosis, hydroureter, or nephrolithiasis. No evidence of SBO. During ED visit patient received     ondansetron [facility]) injection 8 mg (8 mg IntraVENous Given 9/20/20 1458)   oxyCODONE-acetaminophen (PERCOCET) 5-325 mg per tablet 1 Tab (1 Tab Oral Given 9/20/20 1458)   sodium chloride 0.9 % bolus infusion 1,000 mL (0 mL IntraVENous IV Completed 9/20/20 1742)   sodium chloride 0.9 % bolus infusion 1,000 mL (0 mL IntraVENous IV Completed 9/20/20 1742)       This nurse spoke with patient today and he stated that he is doing a lot better. He reported that he continues to have some pain in his back, and rated pain 4/10 on pain scale. Patient denies chest pain, dizziness, shortness of breath, or vomiting. Patient reports vomiting, and he is taking promethazine every 6 hours as prescribed, and oxycodone every 6 hours for pain which is helping.     Patient denies constipation - last BM on 9/20/20    Virtual follow up visit with Palliative schedule for 9/22/20     Information was shared with Dr. Leandra Julian, RN  Palliative Medicine

## 2020-09-22 ENCOUNTER — VIRTUAL VISIT (OUTPATIENT)
Dept: PALLATIVE CARE | Age: 65
End: 2020-09-22
Payer: COMMERCIAL

## 2020-09-22 DIAGNOSIS — C24.9 CHOLANGIOCARCINOMA DETERMINED BY BIOPSY OF BILIARY TRACT (HCC): Primary | ICD-10-CM

## 2020-09-22 DIAGNOSIS — N23 RENAL COLIC: ICD-10-CM

## 2020-09-22 DIAGNOSIS — R10.10 PAIN OF UPPER ABDOMEN: ICD-10-CM

## 2020-09-22 DIAGNOSIS — M54.9 MID BACK PAIN: ICD-10-CM

## 2020-09-22 PROCEDURE — 99495 TRANSJ CARE MGMT MOD F2F 14D: CPT | Performed by: INTERNAL MEDICINE

## 2020-09-22 RX ORDER — OXYCODONE AND ACETAMINOPHEN 5; 325 MG/1; MG/1
1 TABLET ORAL
Qty: 10 TAB | Refills: 0 | Status: SHIPPED | OUTPATIENT
Start: 2020-09-22 | End: 2020-09-25

## 2020-09-22 RX ORDER — FENTANYL 37.5 UG/H
37.5 PATCH, EXTENDED RELEASE TRANSDERMAL
Qty: 5 PATCH | Refills: 0 | Status: SHIPPED | OUTPATIENT
Start: 2020-09-22 | End: 2020-10-11 | Stop reason: SDUPTHER

## 2020-09-22 RX ORDER — METFORMIN HYDROCHLORIDE 500 MG/1
TABLET, EXTENDED RELEASE ORAL
Qty: 180 TAB | Refills: 3 | Status: SHIPPED | OUTPATIENT
Start: 2020-09-22 | End: 2020-10-15

## 2020-09-22 NOTE — PROGRESS NOTES
Palliative Medicine Office Visit  Palliative Medicine Nurse Check In  [phone]    Patient Name: Taran Sebastian. YOB: 1955      Date of Office Visit:     Patient states: \"  \"    From Check In Sheet (scanned in Media):  Has a medical provider talked with you about cardiopulmonary resuscitation (CPR)? [] Yes   [] No   [] Unable to obtain    Nurse reminder to complete or update ACP FlowSheet:    Is ACP on the Problem List?    [] Yes    [] No  IF ACP Document is ON FILE; Nurse to place ACP on Problem List     Is there an ACP Note in Chart Review/Note? [] Yes    [] No   If NO: [address] 8/26/2020   Patient's Healthcare Decision Maker is: Named in scanned ACP document   Primary Decision Maker Name -   Primary Decision Maker Phone Number -   Primary Decision Maker Relationship to Patient -   Confirm Advance Directive Yes, on file   Patient Would Like to Complete Advance Directive -   Does the patient have other document types -       Is there anything that we should know about you as a person in order to provide you the best care possible? Have you been to the ER, urgent care clinic since your last visit? [x] Yes   [] No   [] Unable to obtain    Have you been hospitalized since your last visit? [x] Yes   [] No   [] Unable to obtain    Have you seen or consulted any other health care providers outside of the [address] [place] [address] since your last visit?    [] Yes   [x] No   [] Unable to obtain    Functional status (describe):         Last BM: 9/21/2020     accessed (date): 9/22/2020    Bottle review (for opioid pain medication):  Medication 1:   Date filled:   Directions:   # filled:   # left:   # pills taking per day:  Last dose taken:    Medication 2:   Date filled:   Directions:   # filled:   # left:   # pills taking per day:  Last dose taken:    Medication 3:   Date filled:   Directions:   # filled:   # left:   # pills taking per day:  Last dose taken:    Medication 4:   Date filled:   Directions:   # filled:   # left:   # pills taking per day:  Last dose taken:

## 2020-09-23 NOTE — PATIENT INSTRUCTIONS
Dear Sanjay Heredia. , 
 
It was a pleasure seeing you today in your home along with your wife virtually We will see you again in 1 month virtually If labs or imaging tests have been ordered for you today, please call the office  at [phone] 48 hours after completion to obtain the results. Your stated goal:  
- better pain management Your described symptoms were: Fatigue: 8 Drowsiness: 3 Depression: 0 Pain: 4 Anxiety: 0 Nausea: 3 Anorexia: 2 Dyspnea: 1 Constipation: Yes Other Problem (Comment): 0 This is the plan we talked about: 1. Mid back pain and new mid upper abdominal pain - The back pain is much better with SBRT to the back which you completed 2 weeks ago. 
-We tried to wean you off of the fentanyl to see how you did but the pain returned and you did not do well.   
-You are doing well on fentanyl 25 mcg patch every 3 days up until 2 to 3 weeks ago where the pain has been increasing slowly and you have been taking tramadol up to 4 tablets a day. The pain is not sufficiently controlled with 25 mcg patch anymore so we increased it to 37.5 mcg patches. -You are willing to start decreasing the dose soon. You currently have 2 patches at current dose, I will provide you with a refill for 1 more box, 5 patches. This will get you to 21 days of fentanyl at 37.5 mcg patches every 3 days. After that, let us try fentanyl 25 mcg patch to see how you tolerate it. 2.  Postoperative painr and enal colic pain 
-You are using Dilaudid 4 mg every 4-6 hours as needed. However, this medicine did not help when you had the severe flank pain from the kidney stone again. You were given Percocet in the ER which seemed to help you. I will send you a refill for Percocet just in case he needed in the future. 3. Functional diarrhea 
-This is better since starting Creon 4.   Nausea and vomiting 
-Much better since reversal of the Whipple procedure, currently only needing Zofran as needed. 5. We completed an AMD naming your wife as Nikko. This is what you have shared with us about Advance Care Planning: 
 
  Primary Decision Maker: Jeremy Serrato Spouse - [phone] Secondary Decision Maker: Kirk Morgan - Child - [phone] Supplemental (Other) Decision Maker: Roberto Cristina - Child - [phone] Advance Care Planning 8/26/2020 Patient's Healthcare Decision Maker is: Named in scanned ACP document Primary Decision Maker Name -  
Primary Decision Maker Phone Number -  
Primary Decision Maker Relationship to Patient -  
Confirm Advance Directive Yes, on file Patient Would Like to Complete Advance Directive - Does the patient have other document types - The Palliative Medicine Team is here to support you and your family.   
 
 
Sincerely, 
 
 
Keysha Bridges MD and the Palliative Medicine Team

## 2020-09-23 NOTE — PROGRESS NOTES
Transition Care Management:    Admission date: 9/2/2020  Discharge date: 9/11/2020   Hospital: [facility] [place]  Discharge diagnosis: Status post reversal of Whipple procedure, renal colic. Nurse Navigator note date: 9/15/2020  Nurse Navigator note reviewed in detail: yes    We reviewed the hospital course and discharge summary / recommendation including the discharge medications. The patient presented with severe bilateral flank pain which was thought to be likely renal colic, patient underwent elective reversal of Whipple procedure and celiac plexus block. These symptoms have improved. The patient is following the discharge plan as directed with the following details noted in the [facility] visit note below. Palliative Medicine Outpatient Services  [place]: [phone])    Patient Name: Shukri Burleson YOB: 1955    Date of Current Visit: 09/23/20  Location of Current Visit:    [] [facility] Office  [] [facility] Office  [] [address] [place] [address]  [] Home  [x] Other: Virtual synchronous A/V visit    Date of Initial Visit: 4/6/2020  Referral from: Dr. Eric Sotelo  Primary Care Physician: Moises Coleman DO      SUMMARY:   Shukri Burleson is a 62y.o. 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 Eric Sotelo for management of symptoms and psychosocial support. 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 This is second marriage for both of them.     Current treatment-completed RT to the para-aortic mass, pursuing diagnostics with GI physician Dr. Brian Bull for gastroparesis and pancreatic enzymes, has had biliary stents replaced. Status post celiac plexus block and reversal of Whipple procedure on 9/9/2020    Patient had an ER visit on 9/20/2020 for severe flank pain which was again thought to be from renal colic. Repeat CT in the ER did not show any more kidney stones. PALLIATIVE DIAGNOSES:       ICD-10-CM ICD-9-CM    1. Cholangiocarcinoma determined by biopsy of biliary tract (HCC)  C24.9 156.9    2. Renal colic  Q85 613.2 oxyCODONE-acetaminophen (Percocet) 5-325 mg per tablet      fentaNYL 37.5 mcg/hour pt72   3. Pain of upper abdomen  R10.10 789.09    4. Mid back pain  M54.9 724.5           PLAN:   Patient Instructions     Dear Jefferson Burns. ,    It was a pleasure seeing you today in your home along with your wife virtually    We will see you again in 1 month virtually    If labs or imaging tests have been ordered for you today, please call the office  at [phone] 48 hours after completion to obtain the results. Your stated goal:   - better pain management    Your described symptoms were: Fatigue: 8 Drowsiness: 3   Depression: 0 Pain: 4   Anxiety: 0 Nausea: 3   Anorexia: 2 Dyspnea: 1     Constipation: Yes   Other Problem (Comment): 0       This is the plan we talked about:    1. Mid back pain and new mid upper abdominal pain  - The back pain is much better with SBRT to the back which you completed 2 weeks ago.  -We tried to wean you off of the fentanyl to see how you did but the pain returned and you did not do well.    -You are doing well on fentanyl 25 mcg patch every 3 days up until 2 to 3 weeks ago where the pain has been increasing slowly and you have been taking tramadol up to 4 tablets a day. The pain is not sufficiently controlled with 25 mcg patch anymore so we increased it to 37.5 mcg patches. -You are willing to start decreasing the dose soon.   You currently have 2 patches at current dose, I will provide you with a refill for 1 more box, 5 patches. This will get you to 21 days of fentanyl at 37.5 mcg patches every 3 days. After that, let us try fentanyl 25 mcg patch to see how you tolerate it. 2.  Postoperative painr and enal colic pain  -You are using Dilaudid 4 mg every 4-6 hours as needed. However, this medicine did not help when you had the severe flank pain from the kidney stone again. You were given Percocet in the ER which seemed to help you. I will send you a refill for Percocet just in case he needed in the future. 3. Functional diarrhea  -This is better since starting Creon    4. Nausea and vomiting  -Much better since reversal of the Whipple procedure, currently only needing Zofran as needed. 5. We completed an AMD naming your wife as Nikko. This is what you have shared with us about Advance Care Planning:      Primary Decision Maker: Mary Smith Spouse - [phone]    Secondary Decision Maker: Ridge Cristina III - Child - [phone]    Supplemental (Other) Decision Maker: Isaiah Stills Child - [phone]  Advance Care Planning 8/26/2020   Patient's [place] is: Named in scanned ACP document   Primary Decision Maker Name -   Primary Decision Maker Phone Number -   Primary Decision Maker Relationship to Patient -   Confirm Advance Directive Yes, on file   Patient Would Like to Complete Advance Directive -   Does the patient have other document types -           The Palliative Medicine Team is here to support you and your family.        Sincerely,      Kar Meredith MD and the Palliative Medicine Team       GOALS OF CARE / TREATMENT PREFERENCES:   [====Goals of Care====]  GOALS OF CARE:  Patient / health care proxy stated goals: See Patient Instructions / Summary    TREATMENT PREFERENCES:   Code Status:  [x] Attempt Resuscitation       [] Do Not Attempt Resuscitation    Advance Care Planning:  [x] The [facility] Interdisciplinary Team has updated the ACP Navigator with Decision Maker and Patient Capacity      Primary Decision Maker: Mihir Jo - [phone]    Secondary Decision Maker: Ridge Cristina III - Child - [phone]    Supplemental (Other) Decision Maker: Maik River Child - [phone]  Advance Care Planning 2020   Patient's [place] [address] is: Named in scanned ACP document   Primary Decision Maker Name -   Primary Decision Maker Phone Number -   Primary Decision Maker Relationship to Patient -   Confirm Advance Directive Yes, on file   Patient Would Like to Complete Advance Directive -   Does the patient have other document types -       Other:  (If patient appropriate for POST, consider using PALLPOST smart phrase here)    The palliative care team has discussed with patient / health care proxy about goals of care / treatment preferences for patient.  [====Goals of Care====]     PRESCRIPTIONS GIVEN:     Medications Ordered Today   Medications    oxyCODONE-acetaminophen (Percocet) 5-325 mg per tablet     Sig: Take 1 Tab by mouth every six (6) hours as needed for Pain for up to 3 days. Max Daily Amount: 4 Tabs. Dispense:  10 Tab     Refill:  0    fentaNYL 37.5 mcg/hour pt72     Si.5 mcg by TransDERmal route every seventy-two (72) hours for 30 days. Max Daily Amount: 37.5 mcg.      Dispense:  5 Patch     Refill:  0           FOLLOW UP:     Future Appointments   Date Time Provider Sloane Roach   10/15/2020 10:30 AM Willi Brooks III, DO [facility]   10/21/2020 10:00 AM Qian Jean-Baptiste MD [address] [place] [address]           PHYSICIANS INVOLVED IN CARE:   Patient Care Team:  Armani Ham DO as PCP - General (Internal Medicine)  Armani Ham DO as PCP - [facility] Empaneled Provider  Ashley Fuentes MD (General Surgery)  Madhu Ayers MD (Gastroenterology)  [place] MD (Gastroenterology)  Brianna Peterson MD (Hematology and Oncology)  Qian Jean-Baptiste MD as Palliative Care (Palliative Medicine)  Miesha Klein RN as Benefits Care Manager       HISTORY:   Reviewed patient-completed ESAS and advance care planning form. Reviewed patient record in prescription monitoring program.    CHIEF COMPLAINT: No chief complaint on file. HPI/SUBJECTIVE:    The patient is: [x] Verbal / [] Nonverbal     Patient seen virtually at home. He appears well. We reviewed his recent ER visit for the flank pain. He is convinced that it was a kidney stone related pain and he was able to pass the kidney stone. He is glad that the repeat CT in the ER does not show any more kidney stones. His postoperative course has been pretty good although he is very tired. He did not need much Dilaudid after the surgery and he did not think that the Dilaudid helped for the renal colic pain. Surprisingly, Percocet at a smaller dose has been more helpful. He wants to have some Percocet at home just in case. His wife wants to reduce the fentanyl patch now that he has had the reversal of the Whipple procedure but patient is very hesitant and wants to wait a few more weeks before reducing the fentanyl patch. So we agreed on waiting for 21 days before the next dose decrease.    -------    Patient here with his wife. Both are very good historians. Mid upper back pain-much improved since radiation to the periaortic mass. He struggled with pain for several months before it was identified as cancer recurrence. He was started on fentanyl 25 mcg patch which he wants to wean off of. He has made upper and mid zone abdominal pain which comes and goes. He has not noticed any specific pattern to the pain but usually the pain comes on before vomiting or relieves without vomiting. Sometimes, he vomits food that he ate about 12 to 14 hours ago. No constipation. He has 2 liquid bowel movements every day. He is unable to tolerate eggs or honey. He is getting tests done to evaluate his pancreatic enzymes.     He has very good support through his wife. Clinical Pain Assessment (nonverbal scale for nonverbal patients):   [++++ Clinical Pain Assessment++++]  [++++Pain Severity++++]: Pain: 4  [++++Pain Character++++]: cramping  [++++Pain Duration++++]: months  [++++Pain Effect++++]: functional   [++++Pain Factors++++]: none in particular  [++++Pain Frequency++++]: on and off  [++++Pain Location++++]: upper abdomen and mid back  [++++ Clinical Pain Assessment++++]       FUNCTIONAL ASSESSMENT:     Palliative Performance Scale (PPS):  PPS: 70       PSYCHOSOCIAL/SPIRITUAL SCREENING:     Any spiritual / Alevism concerns:  [] Yes /  [x] No    Caregiver Burnout:  [] Yes /  [x] No /  [] No Caregiver Present      Anticipatory grief assessment:   [x] Normal  / [] Maladaptive       ESAS Anxiety: Anxiety: 0    ESAS Depression: Depression: 0       REVIEW OF SYSTEMS:     The following systems were [x] reviewed / [] unable to be reviewed  Systems: constitutional, ears/nose/mouth/throat, respiratory, gastrointestinal, genitourinary, musculoskeletal, integumentary, neurologic, psychiatric, endocrine. Positive findings noted below. Modified ESAS Completed by: provider   Fatigue: 8 Drowsiness: 3   Depression: 0 Pain: 4   Anxiety: 0 Nausea: 3   Anorexia: 2 Dyspnea: 1     Constipation: Yes   Other Problem (Comment): 0          PHYSICAL EXAM:     Wt Readings from Last 3 Encounters:   09/09/20 145 lb (65.8 kg)   09/03/20 145 lb (65.8 kg)   07/06/20 150 lb (68 kg)     There were no vitals taken for this visit.   Last bowel movement: See Nursing Note    Constitutional    [x] Appears well-developed and well-nourished in no apparent distress    [] Abnormal:  Mental status  [x] Alert and awake  [x] Oriented to person/place/time  [x] Able to follow commands  [] Abnormal:   Eyes  [x] EOM normal   [x] Sclera normal   [x] No visible ocular discharge  [] Abnormal:   HENT  [x] Normocephalic, atraumatic  [x] Mouth/Throat: Moist mucous membranes   [x] External Ears normal  [] Abnormal:  Neck  [x] No visualized mass  [] Abnormal:  Pulmonary/Chest   [x] Respiratory effort normal  [x] No visualized signs of difficulty breathing or respiratory distress  [] Abnormal:  Musculoskeletal  [x] Normal gait with no signs of ataxia  [x] Normal range of motion of neck  [] Abnormal:  Neurological:   [x] No facial asymmetry (Cranial nerve 7 motor function)  [x] No gaze palsy  [] Abnormal:   Skin  [x] No significant exanthematous lesions or discoloration noted on facial skin  [] Abnormal:                                  Psychiatric  [x] Normal affect  [x] No hallucinations  [] Abnormal:    Other pertinent observable physical exam findings:    Due to this being a TeleHealth evaluation, many elements of the physical examination are unable to be assessed. HISTORY:     Past Medical History:   Diagnosis Date    Arrhythmia     Previous a.fib, ablation, NSR now; NO LONGER FOLLOWED BY CARDIOLOGIST.     Autoimmune disease ([facility]     SJOGREN'S    Cancer ([facility]     COMMON BILE DUCT, ADENOCARCINOMA    Diabetes ([facility]     Family history of skin cancer     Hypertension     Sepsis ([facility] 2017    STENTING TO BILE DUCT    Status post chemotherapy     Stroke [facility]) 2008    brain aneurysm - no deficits    Sun-damaged skin     Sunburn, blistering       Past Surgical History:   Procedure Laterality Date    HX GI      COLONOSCOPY, POLYPS (BENIGN)    HX GI  10/06/2017    [facility]     [address]  2005    Ablation     HX ORTHOPAEDIC  2000    Spinal fusion W/ HARDWARE    HX OTHER SURGICAL  11/07/2017    [place], Dr. Patel Salvage  2017    PORTACATH - then removed    NEUROLOGICAL PROCEDURE UNLISTED  2005    [place] hole washout from cerebral hemorrhage      Family History   Problem Relation Age of Onset    Cancer Father         Breast and Colon    Cancer Mother         LEUKEMIA    No Known Problems Sister     No Known Problems Brother     No Known Problems Sister     Anesth Problems Neg Hx       History reviewed, no pertinent family history. Social History     Tobacco Use    Smoking status: Never Smoker    Smokeless tobacco: Never Used   Substance Use Topics    Alcohol use: Never     Frequency: Never     Comment: very rare     Allergies   Allergen Reactions    Shellfish Derived Anaphylaxis     Soft shell crabs    Morphine Nausea and Vomiting    Pcn [Penicillins] Other (comments)     Pt stated \"always been told PCN\"  Pt tolerated other cephalosporins in the past      Current Outpatient Medications   Medication Sig    oxyCODONE-acetaminophen (Percocet) 5-325 mg per tablet Take 1 Tab by mouth every six (6) hours as needed for Pain for up to 3 days. Max Daily Amount: 4 Tabs.  fentaNYL 37.5 mcg/hour pt72 37.5 mcg by TransDERmal route every seventy-two (72) hours for 30 days. Max Daily Amount: 37.5 mcg.  promethazine (PHENERGAN) 25 mg tablet Take 1 Tab by mouth every six (6) hours as needed for Nausea.  vitamin A (AQUASOL A) 10,000 unit capsule Take 5 Caps by mouth daily for 14 days, THEN 2 Caps daily for 14 days.  HYDROmorphone (DILAUDID) 4 mg tablet Take 0.5-1 Tabs by mouth every four (4) hours as needed for Pain for up to 15 days. Max Daily Amount: 24 mg.    lipase-protease-amylase (Creon) 24,000-76,000 -120,000 unit capsule Take 4 Caps by mouth three (3) times daily (with meals).  ondansetron (ZOFRAN ODT) 4 mg disintegrating tablet Take 1 Tab by mouth every eight (8) hours as needed for Nausea or Vomiting.  loperamide (IMODIUM) 2 mg capsule Take 2-4 mg by mouth as needed for Diarrhea. Give 4 mg after first loose stool. Give an additional 2 mg after each loose stool as needed up to a maximum of 8 doses (16 mg/day).  lipase-protease-amylase (Creon) 36,000-114,000- 180,000 unit cpDR capsule Take 1 Cap by mouth as needed (for snacks).  2-3 caps each meal and 1 cap for snacks (see additional order)    dutasteride (AVODART) 0.5 mg capsule Take 0.5 mg by mouth daily.  traMADoL (ULTRAM) 50 mg tablet Take 1 Tab by mouth every six (6) hours as needed for Pain for up to 30 days. Max Daily Amount: 200 mg.    gabapentin (NEURONTIN) 100 mg capsule Take 3 capsules by mouth twice a day.  fentaNYL 37.5 mcg/hour pt72 37.5 mcg by TransDERmal route every seventy-two (72) hours for 30 days. Max Daily Amount: 37.5 mcg.  empagliflozin (Jardiance) 25 mg tablet Take 1 Tab by mouth daily. (Patient taking differently: Take 25 mg by mouth nightly.)    cyanocobalamin (VITAMIN B12) 1,000 mcg/mL injection INJECT CONTENTS OF ONE VIAL INTRAMUSCULARLY EVERY OTHER WEEK    Creon 36,000-114,000- 180,000 unit cpDR capsule Take 2-3 Caps by mouth three (3) times daily (with meals). 2-3 caps each meal and 1 cap for snacks (see additional order)    finasteride (PROSCAR) 5 mg tablet TAKE 1 TABLET BY MOUTH EVERY DAY    ramipriL (ALTACE) 10 mg capsule TAKE 1 CAPSULE BY MOUTH EVERY DAY AT NIGHT    ondansetron (ZOFRAN ODT) 4 mg disintegrating tablet Take 1 Tab by mouth every eight (8) hours as needed for Nausea.  acetaminophen (TYLENOL) 325 mg tablet Take 2 Tabs by mouth every four (4) hours as needed for Pain or Fever (Over the counter medication).  sucralfate (CARAFATE) 1 gram tablet Take 1 g by mouth four (4) times daily.  omeprazole (PRILOSEC) 40 mg capsule Take 1 Cap by mouth daily.  sildenafil citrate (VIAGRA) 50 mg tablet Take 1 Tab by mouth as needed (ED).  tamsulosin (FLOMAX) 0.4 mg capsule TAKE ONE CAPSULE BY MOUTH EVERY EVENING    alpha-D-galactosidase (BEANO PO) Take 1 Tab by mouth two (2) times a day.  cetirizine (ZYRTEC) 10 mg tablet Take 10 mg by mouth nightly.  metFORMIN ER (GLUCOPHAGE XR) 500 mg tablet TAKE 1 TABLET BY MOUTH TWICE A DAY     No current facility-administered medications for this visit.            LAB DATA REVIEWED:     Lab Results   Component Value Date/Time    WBC 9.9 09/20/2020 02:28 PM    HGB 14.7 09/20/2020 02:28 PM    PLATELET 446 72/57/9369 02:28 PM     Lab Results   Component Value Date/Time    Sodium 132 (L) 09/20/2020 02:28 PM    Potassium 4.2 09/20/2020 02:28 PM    Chloride 99 09/20/2020 02:28 PM    CO2 29 09/20/2020 02:28 PM    BUN 23 (H) 09/20/2020 02:28 PM    Creatinine 0.99 09/20/2020 02:28 PM    Calcium 9.6 09/20/2020 02:28 PM    Magnesium 2.4 12/31/2019 07:37 AM    Phosphorus 4.0 08/18/2018 04:20 AM      Lab Results   Component Value Date/Time    Alk.  phosphatase 296 (H) 09/20/2020 02:28 PM    Protein, total 7.1 09/20/2020 02:28 PM    Albumin 3.8 09/20/2020 02:28 PM    Globulin 3.3 09/20/2020 02:28 PM     Lab Results   Component Value Date/Time    INR 1.1 09/09/2020 02:15 AM    Prothrombin time 11.5 (H) 09/09/2020 02:15 AM    aPTT 29.9 09/09/2020 02:15 AM      Lab Results   Component Value Date/Time    Iron 31 (L) 01/02/2020 03:24 AM    TIBC 245 (L) 01/02/2020 03:24 AM    Iron % saturation 13 (L) 01/02/2020 03:24 AM    Ferritin 122 01/02/2020 03:24 AM           CONTROLLED SUBSTANCES SAFETY ASSESSMENT (IF ON CONTROLLED SUBSTANCES):     Reviewed opioid safety handout:  [x] Yes   [] No  24 hour opioid dose >150mg morphine equivalent/day:  [] Yes   [x] No  Benzodiazepines:  [] Yes   [x] No  Sleep apnea:  [] Yes   [x] No  Urine Toxicology Testing within last 6 months:  [] Yes   [x] No  History of or new aberrant medication taking behaviors:  [] Yes   [x] No  Has Narcan been prescribed [x] Yes   [] No          Total time: 70 minutes  Counseling / coordination time:   > 50% counseling / coordination?:   Consent:  He and/or health care decision maker is aware that that he may receive a bill for this telehealth service, depending on his insurance coverage, and has provided verbal consent to proceed: Yes    CPT Codes 10177-87289 for Established Patients may apply to this Telehealth Visit  Pursuant to the emergency declaration under the [address] and the [facility] waiver authority and the Coronavirus Preparedness and Response Supplemental Appropriations Act, this Virtual  Visit was conducted, with patient's consent, to reduce the patient's risk of exposure to COVID-19 and provide continuity of care for an established patient. Services were provided through a video synchronous discussion virtually to substitute for in-person clinic visit.

## 2020-09-25 ENCOUNTER — PATIENT OUTREACH (OUTPATIENT)
Dept: OTHER | Age: 65
End: 2020-09-25

## 2020-09-25 NOTE — Clinical Note
I called Eduar Jonnie for his birthday - opened [place]. I'll follow him in a month.    Kept you on the care team.

## 2020-09-25 NOTE — PROGRESS NOTES
CM contacting patient to wish him a happy birthday/  Noted EVERTON episode with UTR - intake EVERTON assessment completed. Patient was admitted to [facility] [address] [place] [address] on 2020 and discharged on 2020 for G_J George-EN Y revision. Was patient contacted within 2 business days of discharge? No.    Challenges to be reviewed by the provider   Additional needs identified to be addressed with provider no  none    Discussed COVID-19 related testing which was not done at this time. Test results were not done. Patient informed of results, if available? NA        Method of communication with provider : none    Advance Care Planning:   Does patient have an Advance Directive:  yes, reviewed and current     Inpatient Readmission Risk score: Not available  Was this a readmission? no   Patient stated reason for the admission: Chronic N/V     Patients top risk factors for readmission: Disease process Debilitation following tx for pancreatic cancer/ kidney stones  Interventions to address risk factors: Obtained and reviewed discharge summary and/or continuity of care documents and Reviewed and followed up on pending diagnostic tests and treatments-Monitoring for dormant pancreatic CA - now in remission     Care Transition Nurse (CTN) contacted the patient by telephone to perform post hospital discharge assessment. Verified name and  with patient as identifiers. Provided introduction to self, and explanation of the CTN role. * Mr. Sonia Charles is doing very well at home post op   - \"First time in 3 years that I haven't had nausea and vomiting. \"  * CM wishes patient a happy birthday. - Family will gather tomorrow for cook out in open garage - they are isolating as a family - [place]. * Happy that his cancer is currently in remission.   - One spot that the doctor's believe is not viable- but will monitor closely for any changes indicating tumor growth.       CTN reviewed discharge instructions, medical action plan and red flags with patient who verbalized understanding. Were discharge instructions available to patient? yes. Reviewed appropriate site of care based on symptoms and resources available to patient including: Santana Marrufo and His wife is RN- working on PhD- best resource for him. Patient given an opportunity to ask questions and does not have any further questions or concerns at this time. The patient agrees to contact the PCP office for questions related to their healthcare. Medication reconciliation was performed with patient, who verbalizes understanding of administration of home medications. Advised obtaining a 90-day supply of all daily and as-needed medications. Referral to Pharm D needed: no     Home Health/Outpatient orders at discharge: none    Covid Risk Education    Patient has following risk factors of: Debility with pancreatic CA tx - now in remission. . Education provided regarding infection prevention, and signs and symptoms of COVID-19 and when to seek medical attention with patient who verbalized understanding. Discussed exposure protocols and quarantine From CDC: Are you at higher risk for severe illness?  and given an opportunity for questions and concerns. The patient agrees to contact the COVID-19 hotline 022-609-0680 or PCP office for questions related to COVID-19. For more information on steps you can take to protect yourself, see CDC's How to Protect Yourself     Patient/family/caregiver given information for Hai Marx and agrees to enroll no  Patient's preferred e-mail: declines  Patient's preferred phone number: declines    Discussed follow-up appointments.  If no appointment was previously scheduled, appointment scheduling offered: Apts are in place Is follow up appointment scheduled within 7 days of discharge? no   [facility] follow up appointment(s):   Future Appointments   Date Time Provider Sloane Roach   10/15/2020 10:30 AM Nathan George III, DO [facility] BS AMB   10/21/2020 10:00 AM Elizabeth Young MD [address] [facility] BS AMB       Goals Addressed                 This Visit's Progress     Prevent complications post hospitalization. [facility]  9/2-9/11 - George Y revision    9/25 -  Doing well  no N/V - first time in 3 years. FU in place. .   ED for kidney stone- passed. No UTI or s/sx of pyleo. Cancer in remission.           CM will FU post op needs:

## 2020-09-29 ENCOUNTER — TELEPHONE (OUTPATIENT)
Dept: SURGERY | Age: 65
End: 2020-09-29

## 2020-10-05 DIAGNOSIS — N23 RENAL COLIC: ICD-10-CM

## 2020-10-05 RX ORDER — OXYCODONE AND ACETAMINOPHEN 5; 325 MG/1; MG/1
1 TABLET ORAL
Qty: 10 TAB | Refills: 0 | Status: CANCELLED | OUTPATIENT
Start: 2020-10-05 | End: 2020-10-08

## 2020-10-05 RX ORDER — FENTANYL 37.5 UG/H
37.5 PATCH, EXTENDED RELEASE TRANSDERMAL
Qty: 5 PATCH | Refills: 0 | Status: CANCELLED | OUTPATIENT
Start: 2020-10-05 | End: 2020-11-04

## 2020-10-06 ENCOUNTER — OFFICE VISIT (OUTPATIENT)
Dept: SURGERY | Age: 65
End: 2020-10-06
Payer: COMMERCIAL

## 2020-10-06 VITALS
OXYGEN SATURATION: 96 % | HEART RATE: 85 BPM | WEIGHT: 140.6 LBS | TEMPERATURE: 98.3 F | RESPIRATION RATE: 16 BRPM | SYSTOLIC BLOOD PRESSURE: 104 MMHG | DIASTOLIC BLOOD PRESSURE: 66 MMHG | BODY MASS INDEX: 21.31 KG/M2 | HEIGHT: 68 IN

## 2020-10-06 DIAGNOSIS — C77.9 CHOLANGIOCARCINOMA METASTATIC TO LYMPH NODE (HCC): Primary | ICD-10-CM

## 2020-10-06 DIAGNOSIS — R11.14 BILIOUS VOMITING WITH NAUSEA: ICD-10-CM

## 2020-10-06 DIAGNOSIS — C22.1 CHOLANGIOCARCINOMA METASTATIC TO LYMPH NODE (HCC): Primary | ICD-10-CM

## 2020-10-06 PROCEDURE — 99024 POSTOP FOLLOW-UP VISIT: CPT | Performed by: SURGERY

## 2020-10-06 NOTE — LETTER
10/6/20 Patient: Ajit Barger. YOB: 1955 Date of Visit: 10/6/2020 Professor Carine Lester DO 
[address] [facility] [address] [address] VIA In Basket Dear Professor Carine Lester DO, Thank you for referring Mr. Odalis Mendenhall to [facility] for evaluation. My notes for this consultation are attached. If you have questions, please do not hesitate to call me. I look forward to following your patient along with you.  
 
 
Sincerely, 
 
Kori Brower MD

## 2020-10-06 NOTE — PROGRESS NOTES
[facility]      Clinic Note - Follow up    [address] returns for scheduled follow up today. 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 He is s/p a pylorus preserving whipple procedure on 10/6/17, final pathology [id number]. He completed adjuvant therapy after this. He has had issues with an anastomotic G-J stricture and more recently has had recurrent pancreatitis. He had an ERP with pancreatic stent placement which was repeated on 4/1/20. He has also had stereotactic radiation therapy for localized recurrence of his cholangiocarcinoma along the retroperitoneum. The patient is now status post revision of his duodenojejunostomy to a George-en-Y reconstruction due to ongoing chronic bilious vomiting which was performed on 9/9/2020. The patient had some issues with kidney stones after his surgery that caused significant pain and nausea vomiting. This is now resolved and he is doing quite well. He has had no further issues with nausea or vomiting and is eating well. He states he has gained about 10 pounds. His pain control is significantly improved after the celiac plexus block. He does still have some pain in his low back but overall feels markedly improved. He has follow-up with oncology this week to discuss next steps including next imaging plans. He does have questions regarding whether he should remain on Creon which was started by GI. The patient denies any changes to the [facility], PSHx, SHx or FHx since their last visit except as mentioned above. ROS is negative except as mentioned in the HPI. Objective     Visit Vitals  /66 (BP 1 Location: Left arm, BP Patient Position: Sitting)   Pulse 85   Temp 98.3 °F (36.8 °C) (Oral)   Resp 16   Ht 5' 8\" (1.727 m)   Wt 140 lb 9.6 oz (63.8 kg)   SpO2 96%   BMI 21.38 kg/m²         PE  GEN - Awake, alert, communicating appropriately.   NAD  Pulm - CTAB  CV - RRR  Abd - soft, NT, ND. Scaphoid. Incisions well-healed. No palpable masses or organomegaly. Ext - warm, well perfused, no edema. All other systems negative unless indicated above. Labs  None      Imaging  None      Assessment     Sergio Collado is a 60 y. o.yr old male known to me for a history of distal cholangiocarcinoma. He is s/p a pylorus preserving whipple procedure on 10/6/17, final pathology [id number]. He completed adjuvant therapy after this. He has had issues with an anastomotic G-J stricture and more recently has had recurrent pancreatitis. He had an ERP with pancreatic stent placement which was repeated on 4/1/20. He has also had stereotactic radiation therapy for localized recurrence of his cholangiocarcinoma along the retroperitoneum. The patient is now status post revision of his duodenojejunostomy to a George-en-Y reconstruction due to ongoing chronic bilious vomiting which was performed on 9/9/2020. Plan     The patient is symptomatically much better after celiac plexus block as well as laparoscopic revision of his post Whipple anatomy to a George-en-Y duodenojejunostomy. The patient has follow-up with oncology for management of his recurrent cholangiocarcinoma. I have recommended the patient stay on Creon until his weight is back to baseline and stabilized. We could consider weaning this off at that time dependent on his symptoms. I will plan to see the patient back as needed.       Evan Medina MD  10/6/2020    CC: Sergey Cuevas

## 2020-10-06 NOTE — PROGRESS NOTES
1. Have you been to the ER, urgent care clinic since your last visit? Hospitalized since your last visit? No     2. Have you seen or consulted any other health care providers outside of the [address] [place] [address] since your last visit? Include any pap smears or colon screening.  No

## 2020-10-11 DIAGNOSIS — N23 RENAL COLIC: ICD-10-CM

## 2020-10-12 RX ORDER — FENTANYL 37.5 UG/H
37.5 PATCH, EXTENDED RELEASE TRANSDERMAL
Qty: 5 PATCH | Refills: 0 | Status: SHIPPED | OUTPATIENT
Start: 2020-10-12 | End: 2020-11-11

## 2020-10-12 NOTE — TELEPHONE ENCOUNTER
Triage for Controlled Substance Refill Request    Pain Diagnosis: _Renal colic     Last Outpatient Visit: _6/88/3175    Next Outpatient Visit: _10/21/2020    Reason for refill needed outside of office visit?   -Appointment not scheduled prior to need for scheduled refill      Pharmacy: _[facility]/PHARMACY #[id number]- [address] 37.5 mcg/hour  Dose and directions:37.5 mcg by TransDERmal route every seventy-two (72) hours  Number dispensed:5  Date filled ( or Pharmacy):9/22/2020  # left:         reviewed: _yes     Date of Urine Drug Screen:  _n/a    Opioid Safety Handout given:  _yes     Appropriate for refill:  _yes     Action:  _ please refill

## 2020-10-14 ENCOUNTER — PATIENT MESSAGE (OUTPATIENT)
Dept: PALLATIVE CARE | Age: 65
End: 2020-10-14

## 2020-10-14 DIAGNOSIS — R10.84 ABDOMINAL PAIN, GENERALIZED: ICD-10-CM

## 2020-10-14 DIAGNOSIS — C24.9 CHOLANGIOCARCINOMA DETERMINED BY BIOPSY OF BILIARY TRACT (HCC): Primary | ICD-10-CM

## 2020-10-15 ENCOUNTER — PATIENT OUTREACH (OUTPATIENT)
Dept: OTHER | Age: 65
End: 2020-10-15

## 2020-10-15 ENCOUNTER — OFFICE VISIT (OUTPATIENT)
Dept: INTERNAL MEDICINE CLINIC | Age: 65
End: 2020-10-15
Payer: COMMERCIAL

## 2020-10-15 VITALS
WEIGHT: 138 LBS | DIASTOLIC BLOOD PRESSURE: 67 MMHG | TEMPERATURE: 96.4 F | BODY MASS INDEX: 20.92 KG/M2 | RESPIRATION RATE: 14 BRPM | HEIGHT: 68 IN | SYSTOLIC BLOOD PRESSURE: 102 MMHG | OXYGEN SATURATION: 97 % | HEART RATE: 67 BPM

## 2020-10-15 DIAGNOSIS — E11.9 TYPE 2 DIABETES MELLITUS WITHOUT COMPLICATION, WITHOUT LONG-TERM CURRENT USE OF INSULIN (HCC): ICD-10-CM

## 2020-10-15 DIAGNOSIS — C22.1 CHOLANGIOCARCINOMA OF BILIARY TRACT (HCC): Primary | ICD-10-CM

## 2020-10-15 DIAGNOSIS — N40.1 BENIGN PROSTATIC HYPERPLASIA WITH NOCTURIA: ICD-10-CM

## 2020-10-15 DIAGNOSIS — R53.83 OTHER FATIGUE: ICD-10-CM

## 2020-10-15 DIAGNOSIS — I10 ESSENTIAL HYPERTENSION: ICD-10-CM

## 2020-10-15 DIAGNOSIS — R35.1 BENIGN PROSTATIC HYPERPLASIA WITH NOCTURIA: ICD-10-CM

## 2020-10-15 DIAGNOSIS — I48.0 PAROXYSMAL ATRIAL FIBRILLATION (HCC): ICD-10-CM

## 2020-10-15 PROCEDURE — 99214 OFFICE O/P EST MOD 30 MIN: CPT | Performed by: INTERNAL MEDICINE

## 2020-10-15 RX ORDER — HYDROMORPHONE HYDROCHLORIDE 4 MG/1
4 TABLET ORAL
Qty: 90 TAB | Refills: 0 | Status: SHIPPED | OUTPATIENT
Start: 2020-10-15 | End: 2020-11-14

## 2020-10-15 RX ORDER — RAMIPRIL 5 MG/1
5 CAPSULE ORAL DAILY
Qty: 90 CAP | Refills: 3 | Status: SHIPPED | OUTPATIENT
Start: 2020-10-15 | End: 2020-10-20 | Stop reason: SDUPTHER

## 2020-10-15 RX ORDER — TRAMADOL HYDROCHLORIDE 50 MG/1
TABLET ORAL
COMMUNITY
Start: 2020-10-01 | End: 2020-10-15

## 2020-10-15 RX ORDER — LIDOCAINE 50 MG/G
1 PATCH TOPICAL EVERY 24 HOURS
Qty: 30 EACH | Refills: 5 | Status: SHIPPED | OUTPATIENT
Start: 2020-10-15 | End: 2022-01-01 | Stop reason: ALTCHOICE

## 2020-10-15 NOTE — PROGRESS NOTES
Milagros Curran. is a 60 y.o. male who presents for evaluation of hospital follow up. Last seen by me April 6, 2020 in virtual visit. He continued to struggle with weight loss, and n/v. Was inpt again stm from sept 2-11, and sx to transform his whipple to eren-en y in an attempt to help with his continued n/v. It seems to have worked well, as since then he has done much better, food is staying down, and he is gaining some weight back. He did have some kidney stones that knocked him down some, but he is doing better now. Complains today of no energy to do much of anything. Also continues to have some point tenderness in lower back, as well as right groin. ROS:  Constitutional: negative for fevers, chills, anorexia and weight loss  Eyes:   negative for visual disturbance and irritation  ENT:   negative for tinnitus,sore throat,nasal congestion,ear pain,hoarseness  Respiratory:  negative for cough, hemoptysis, dyspnea,wheezing  CV:   negative for chest pain, palpitations, lower extremity edema  GI:   negative for nausea, vomiting, diarrhea, abdominal pain,melena  Genitourinary: negative for frequency, dysuria and hematuria  Musculoskel: negative for myalgias, arthralgias, back pain, muscle weakness, joint pain  Neurological:  negative for headaches, dizziness, focal weakness, numbness  Psychiatric:     Negative for depression or anxiety      Past Medical History:   Diagnosis Date    Arrhythmia     Previous a.fib, ablation, NSR now; NO LONGER FOLLOWED BY CARDIOLOGIST.     Autoimmune disease ([address]     SJOGREN'S    Cancer ([address]     COMMON BILE DUCT, ADENOCARCINOMA    Diabetes ([address]     Family history of skin cancer     Hypertension     Sepsis ([address] 2017    STENTING TO BILE DUCT    Status post chemotherapy     Stroke [facility]) 2008    brain aneurysm - no deficits    Sun-damaged skin     Sunburn, blistering        Past Surgical History:   Procedure Laterality Date    HX GI      COLONOSCOPY, POLYPS (BENIGN)    HX GI  10/06/2017    Viktor Rossi [facility] [place]     Nancy Garcia [address] [place] [address]  2005    Ablation     HX ORTHOPAEDIC  2000    Spinal fusion W/ HARDWARE    HX OTHER SURGICAL  11/07/2017    Israel Jamison, Dr. Celene Paget  2017    PORTACATH - then removed    NEUROLOGICAL PROCEDURE UNLISTED  2005    Ting hole washout from cerebral hemorrhage       Family History   Problem Relation Age of Onset    Cancer Father         Breast and Colon    Cancer Mother         LEUKEMIA    No Known Problems Sister     No Known Problems Brother     No Known Problems Sister     Anesth Problems Neg Hx        Social History     Socioeconomic History    Marital status:      Spouse name: Not on file    Number of children: Not on file    Years of education: Not on file    Highest education level: Not on file   Occupational History    Not on file   Social Needs    Financial resource strain: Not on file    Food insecurity     Worry: Not on file     Inability: Not on file    Transportation needs     Medical: Not on file     Non-medical: Not on file   Tobacco Use    Smoking status: Never Smoker    Smokeless tobacco: Never Used   Substance and Sexual Activity    Alcohol use: Never     Frequency: Never     Comment: very rare    Drug use: No    Sexual activity: Yes     Partners: Female   Lifestyle    Physical activity     Days per week: Not on file     Minutes per session: Not on file    Stress: Not on file   Relationships    Social connections     Talks on phone: Not on file     Gets together: Not on file     Attends Druze service: Not on file     Active member of club or organization: Not on file     Attends meetings of clubs or organizations: Not on file     Relationship status: Not on file    Intimate partner violence     Fear of current or ex partner: Not on file     Emotionally abused: Not on file     Physically abused: Not on file     Forced sexual activity: Not on file   Other Topics Concern    Not on file   Social History Narrative    Not on file            Visit Vitals  /67 (BP 1 Location: Right arm, BP Patient Position: Sitting)   Pulse 67   Temp (!) 96.4 °F (35.8 °C) (Temporal)   Resp 14   Ht 5' 8\" (1.727 m)   Wt 138 lb (62.6 kg)   SpO2 97%   BMI 20.98 kg/m²       Physical Examination:   General - thin appearing male  HEENT - PERRL, TM no erythema/opacification, normal nasal turbinates, no oropharyngeal erythema or exudate, MMM  Neck - supple, no bruits, no thyroidomegaly, no lymphadenopathy  Pulm - clear to auscultation bilaterally  Cardio - RRR, normal S1 S2, no murmur  Abd - soft, nontender, no masses, no HSM  Extrem - no edema, +2 distal pulses  Neuro-  No focal deficits, CN intact     Assessment/Plan:    1. Fatigue--check h/h, bmp, testosterone, tsh  2.  Relative hypotension--decrease dose of altace from 10 to 5 mg  3. Dm, type 2--doing well with just jardiance. Check a1c, urine micro  4. Low back pain--rx for lidoderm patch  5.  pafib--sp ablation  6. bph--on proscar  7. Pancreatic cancer--sp whipple, then converted to eren en y. On creon. 8.  Chronic pain--on fentanyl patch and dilaudid. Follows with palliative care now    Flu shot given today.   rtc 3 months        Roz Neal III, DO

## 2020-10-15 NOTE — TELEPHONE ENCOUNTER
Triage for Controlled Substance Refill Request     Pain Diagnosis: Renal colic      Last Outpatient Visit: 9/22/2020     Next Outpatient Visit: 10/21/2020     Reason for refill needed outside of office visit?   -Appointment not scheduled prior to need for scheduled refill        Pharmacy: _[facility]/PHARMACY [address]      Medication: Dilaudid 4 mg   Dose and direction - I tab ever 4-6 hours as needed  Number dispensed:90  Date filled ( or Pharmacy):9/11/2020  # left:5            reviewed: yes      Date of Urine Drug Screen: n/a     Opioid Safety Handout given: yes      Appropriate for refill: yes      Action:please refill

## 2020-10-15 NOTE — PATIENT INSTRUCTIONS
High-Calorie and High-Protein Diet: Care Instructions Your Care Instructions A high-calorie, high-protein diet gives you more energy and extra nutrition to help your body heal. Your doctor and dietitian can help you design a diet based on your health and what you prefer to eat. Always talk with your doctor or dietitian before you make changes in your diet. Follow-up care is a key part of your treatment and safety. Be sure to make and go to all appointments, and call your doctor if you are having problems. It's also a good idea to know your test results and keep a list of the medicines you take. How can you care for yourself at home? · Eat three meals a day, plus snacks in between and at bedtime. · Include favorite foods in your meals. This will help make meals more pleasant. · Drink your beverage at the end of the meal, because drinking before or during the meal can fill you up. · Eat high-protein foods, such as: ? Meat, fish, and poultry. ? Milk and milk products. Add powdered milk to other foods (such as pudding or soups) to boost the protein. ? Eggs. ? Cooked dried beans and peas. ? Peanut butter, nuts, and seeds. ? Tofu. 
? Cheeses. ? Protein bars. · Eat high-calorie foods, such as: 
? Butter, honey, and brown sugar, added to foods to make them taste better. ? Oils, sauces, and gravies. ? Peanut butter. ? Whole milk, yogurt, mayonnaise, and sour cream. 
? Granola cereal with fruit and granola bars. ? Muffins, pancakes, waffles, and other breads. ? Milkshakes, puddings, and custard. · Try a liquid meal replacement, such as Ensure, Boost, or instant breakfast drinks, if you have trouble eating solid foods. They will give you both calories and protein. Soups and smoothies also are good sources of nutrition. · Keep snacks around that are easy to eat, such as pudding, energy bars, ice cream, and flavored ice pops. · If you can, take a walk before you eat, to make you hungrier. · Drink plenty of fluids. If you have kidney, heart, or liver disease and have to limit fluids, talk with your doctor before you increase the amount of fluids you drink. · Try to avoid eating foods that don't give you much nutrition, such as potato chips, candy bars, and soft drinks. Where can you learn more? Go to http://www.gray.com/ Enter I859 in the search box to learn more about \"High-Calorie and High-Protein Diet: Care Instructions. \" Current as of: August 22, 2019               Content Version: 12.6 © 2780-7432 [email or web address]. Care instructions adapted under license by [employer] (which disclaims liability or warranty for this information). If you have questions about a medical condition or this instruction, always ask your healthcare professional. [address] any warranty or liability for your use of this information.

## 2020-10-16 LAB
ALBUMIN/CREAT UR: 9 MG/G CREAT (ref 0–29)
BUN SERPL-MCNC: 18 MG/DL (ref 8–27)
BUN/CREAT SERPL: 23 (ref 10–24)
CALCIUM SERPL-MCNC: 9.3 MG/DL (ref 8.6–10.2)
CHLORIDE SERPL-SCNC: 101 MMOL/L (ref 96–106)
CO2 SERPL-SCNC: 25 MMOL/L (ref 20–29)
CREAT SERPL-MCNC: 0.8 MG/DL (ref 0.76–1.27)
CREAT UR-MCNC: 183 MG/DL
EST. AVERAGE GLUCOSE BLD GHB EST-MCNC: 111 MG/DL
GLUCOSE SERPL-MCNC: 97 MG/DL (ref 65–99)
HBA1C MFR BLD: 5.5 % (ref 4.8–5.6)
HCT VFR BLD AUTO: 40.5 % (ref 37.5–51)
HGB BLD-MCNC: 13.9 G/DL (ref 13–17.7)
MICROALBUMIN UR-MCNC: 15.6 UG/ML
POTASSIUM SERPL-SCNC: 4.4 MMOL/L (ref 3.5–5.2)
SODIUM SERPL-SCNC: 138 MMOL/L (ref 134–144)
TESTOST FREE SERPL-MCNC: 3.8 PG/ML (ref 6.6–18.1)
TESTOST SERPL-MCNC: 472 NG/DL (ref 264–916)
TSH SERPL DL<=0.005 MIU/L-ACNC: 0.59 UIU/ML (ref 0.45–4.5)

## 2020-10-18 DIAGNOSIS — R79.89 LOW TESTOSTERONE LEVEL IN MALE: Primary | ICD-10-CM

## 2020-10-18 NOTE — PROGRESS NOTES
Overall labs look good. Dm well controlled, a1c 5.5. Total testosterone level is ok, but 'free' level, which is considered the active part, is low. If interested in replacement therapy, then we need a 2nd low fasting level. I put in order, just fax to whatever lab liv he would want to go to.

## 2020-10-20 DIAGNOSIS — E11.9 TYPE 2 DIABETES MELLITUS WITHOUT COMPLICATION, WITHOUT LONG-TERM CURRENT USE OF INSULIN (HCC): ICD-10-CM

## 2020-10-20 DIAGNOSIS — G62.9 NEUROPATHY: ICD-10-CM

## 2020-10-20 RX ORDER — RAMIPRIL 5 MG/1
5 CAPSULE ORAL DAILY
Qty: 90 CAP | Refills: 3 | Status: SHIPPED | OUTPATIENT
Start: 2020-10-20 | End: 2022-01-01 | Stop reason: ALTCHOICE

## 2020-10-20 RX ORDER — GABAPENTIN 100 MG/1
CAPSULE ORAL
Qty: 540 CAP | Refills: 3 | Status: SHIPPED | OUTPATIENT
Start: 2020-10-20 | End: 2022-01-01 | Stop reason: ALTCHOICE

## 2020-10-20 RX ORDER — TAMSULOSIN HYDROCHLORIDE 0.4 MG/1
CAPSULE ORAL
Qty: 90 CAP | Refills: 3 | Status: SHIPPED | OUTPATIENT
Start: 2020-10-20 | End: 2021-01-01

## 2020-10-20 RX ORDER — CYANOCOBALAMIN 1000 UG/ML
INJECTION, SOLUTION INTRAMUSCULAR; SUBCUTANEOUS
Qty: 6 ML | Refills: 6
Start: 2020-10-20 | End: 2021-01-01

## 2020-10-21 ENCOUNTER — TELEPHONE (OUTPATIENT)
Dept: PALLATIVE CARE | Age: 65
End: 2020-10-21

## 2020-10-21 ENCOUNTER — VIRTUAL VISIT (OUTPATIENT)
Dept: PALLATIVE CARE | Age: 65
End: 2020-10-21
Payer: COMMERCIAL

## 2020-10-21 DIAGNOSIS — R11.2 INTRACTABLE NAUSEA AND VOMITING: ICD-10-CM

## 2020-10-21 DIAGNOSIS — N23 RENAL COLIC: ICD-10-CM

## 2020-10-21 DIAGNOSIS — R10.84 ABDOMINAL PAIN, GENERALIZED: Primary | ICD-10-CM

## 2020-10-21 DIAGNOSIS — K59.1 FUNCTIONAL DIARRHEA: ICD-10-CM

## 2020-10-21 PROCEDURE — 99215 OFFICE O/P EST HI 40 MIN: CPT | Performed by: INTERNAL MEDICINE

## 2020-10-21 RX ORDER — FENTANYL 37.5 UG/H
37.5 PATCH, EXTENDED RELEASE TRANSDERMAL
Qty: 5 PATCH | Refills: 0 | Status: CANCELLED | OUTPATIENT
Start: 2020-11-11 | End: 2020-12-11

## 2020-10-21 NOTE — TELEPHONE ENCOUNTER
The patient needs Fentanyl Patch called in to his new pharmacy. [facility] in [facility]. Their phone number is [phone].

## 2020-10-21 NOTE — PROGRESS NOTES
Palliative Medicine Outpatient Services  [place]: [phone] (1962)    Patient Name: Daya Chen. YOB: 1955    Date of Current Visit: 10/21/20  Location of Current Visit:    [] [facility] Office  [] [facility] Office  [] [address] [place] [address]  [] Home  [x] Other: Virtual synchronous A/V visit    Date of Initial Visit: 4/6/2020  Referral from: Dr. Andre Salinas  Primary Care Physician: Pastor Farfan DO      SUMMARY:   Daya Resendiz is a 60y.o. 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 Andre Salinas for management of symptoms and psychosocial support. 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 This is second marriage for both of them. Current treatment-completed RT to the para-aortic mass, pursuing diagnostics with GI physician Dr. Bernard Leon for gastroparesis and pancreatic enzymes, has had biliary stents replaced. Status post celiac plexus block and reversal of Whipple procedure on 9/9/2020    Patient had an ER visit on 9/20/2020 for severe flank pain which was again thought to be from renal colic. Repeat CT in the ER did not show any more kidney stones. PALLIATIVE DIAGNOSES:       ICD-10-CM ICD-9-CM    1. Abdominal pain, generalized  R10.84 789.07    2. Renal colic  K65 677.1    3. Intractable nausea and vomiting  R11.2 536.2    4.  Functional diarrhea  K59.1 564.5           PLAN:   Patient Instructions     Dear Daya Chen. ,    It was a pleasure seeing you today in your home along with your wife virtually    We will see you again in 6 weeks virtually    If labs or imaging tests have been ordered for you today, please call the office  at [phone] 48 hours after completion to obtain the results. Your stated goal:   - better pain management    Your described symptoms were: Fatigue: 5 Drowsiness: 4   Depression: 0 Pain: 8   Anxiety: 0 Nausea: 0   Anorexia: 0 Dyspnea: 3   Best Well-Bein Constipation: Yes   Other Problem (Comment): 0       This is the plan we talked about:    1. Mid back pain and new mid upper abdominal pain  - The back pain is much better with SBRT to the back  -This pain is much better now. 2.  Severe low back pain, history of L5-S1 spinal fusion in   -You are doing well on fentanyl 37.5 mcg patch and the plan is to try 25 mcg patches. You have 4 patches left. You will try them and let us know if you want to continue with the 25 mcg patch or go back to 37.5 mcg.    -You continue to take tramadol 50 mg about 3 times a day for this pain. The low back pain has been chronic but it was not the main pain for which you were started on opioids. We reviewed this in detail today. You were told that you cannot have MRIs because of the hardware that was placed in . However, you had an MRI of the abdomen prior to your recent surgery. Plan is for you to get a PET scan sometime this year. Let us wait for the PET scan to see if we can find anything as the source of your new low back pain. If we do not see anything in the PET scan, I will advocate for an MRI of your lower back. -You have refills on tramadol, you occasionally use Dilaudid 2 to 4 mg for severe pain not relieved with tramadol. You have plenty of Dilaudid left so I am not providing you with a refill. 3.  Postoperative pain  -You have some pain in the right lower quadrant right next to the trocar placement. We believe it could be nerve damage  and referred pain from the surgery.   You have needed tramadol for this every once in a while. Let us continue to see how you do with it. 4.  Renal colic  -Thankfully, this is resolved. The urologist signed off on you. 5. Functional diarrhea  -This is better since starting Creon    6. Multivitamin deficiency  -You were found to have severe vitamin A and vitamin D deficiency. You are on replacement therapy now. Please discuss with your primary care doctor about rechecking the levels and continuing prescription level replacement. 7. We completed an AMD naming your wife as Nikko. This is what you have shared with us about Advance Care Planning:      Primary Decision Maker: Jamar Henry Spouse - [phone]    Secondary Decision Maker: Ridge Cristina III - Child - [phone]    Supplemental (Other) Decision Maker: Michael Rabago Child - [phone]  Advance Care Planning 10/21/2020   Patient's Brandtn is: Named in scanned ACP document   Primary Decision Maker Name -   Primary Decision Maker Phone Number -   Primary Decision Maker Relationship to Patient -   Confirm Advance Directive Yes, on file   Patient Would Like to Complete Advance Directive -   Does the patient have other document types -           The Palliative Medicine Team is here to support you and your family.        Sincerely,      Jeromy Montgomery MD and the Palliative Medicine Team       GOALS OF CARE / TREATMENT PREFERENCES:   [====Goals of Care====]  GOALS OF CARE:  Patient / health care proxy stated goals: See Patient Instructions / Summary    TREATMENT PREFERENCES:   Code Status:  [x] Attempt Resuscitation       [] Do Not Attempt Resuscitation    Advance Care Planning:  [x] [facility] Interdisciplinary Team has updated the ACP Navigator with Decision Maker and Patient Capacity      Primary Decision MakerDonalynn Babinski - [phone]    Secondary Decision Maker: Ridge Cristina III - Child - [phone]    Supplemental (Other) Decision Maker: Roberto Cristina - [phone]  Advance Care Planning 10/21/2020   Patient's Healthcare Decision Maker is: Named in scanned ACP document   Primary Decision Maker Name -   Primary Decision Maker Phone Number -   Primary Decision Maker Relationship to Patient -   Confirm Advance Directive Yes, on file   Patient Would Like to Complete Advance Directive -   Does the patient have other document types -       Other:  (If patient appropriate for POST, consider using PALLPOST smart phrase here)    The palliative care team has discussed with patient / health care proxy about goals of care / treatment preferences for patient.  [====Goals of Care====]     PRESCRIPTIONS GIVEN:     No orders of the defined types were placed in this encounter. FOLLOW UP:     No future appointments. PHYSICIANS INVOLVED IN CARE:   Patient Care Team:  Julia Aguilar DO as PCP - General (Internal Medicine)  Julia Aguilar DO as PCP - [facility]  Kayla Gonzalez MD (General Surgery)  Jj Rodriguez MD (Gastroenterology)  Sabrina Manley MD (Gastroenterology)  Kristel Willoughby MD (Hematology and Oncology)  Essence Bridges MD as Palliative Care (Palliative Medicine)  August Colon RN as Benefits Care Manager       HISTORY:   Reviewed patient-completed ESAS and advance care planning form. Reviewed patient record in prescription monitoring program.    CHIEF COMPLAINT: No chief complaint on file. HPI/SUBJECTIVE:    The patient is: [x] Verbal / [] Nonverbal     Patient seen virtually at home. He appears well. He is starting to gain some weight, his diarrhea has disappeared and he is very happy about that. His mid back pain abdominal pain has improved significantly, however he has started experiencing low back pain. He had severe low back pain about 20 years ago and underwent spinal fusion of L5-S1 with a neurosurgeon in [facility].   He feels that that kind of pain is back now and it is quite severe. He has been taking tramadol 3 times a day for the same and has been using Dilaudid once every few days or so for the same pain. He does have some right lower quadrant abdominal pain around the area where the trocar was placed. And he has needed to use tramadol and Dilaudid for this pain as well. He is willing to wean down on the fentanyl patch to see if this helps. He was given lidocaine patches by his PCP and this is been helpful.    -------    Patient here with his wife. Both are very good historians. Mid upper back pain-much improved since radiation to the periaortic mass. He struggled with pain for several months before it was identified as cancer recurrence. He was started on fentanyl 25 mcg patch which he wants to wean off of. He has made upper and mid zone abdominal pain which comes and goes. He has not noticed any specific pattern to the pain but usually the pain comes on before vomiting or relieves without vomiting. Sometimes, he vomits food that he ate about 12 to 14 hours ago. No constipation. He has 2 liquid bowel movements every day. He is unable to tolerate eggs or honey. He is getting tests done to evaluate his pancreatic enzymes. He has very good support through his wife.     Clinical Pain Assessment (nonverbal scale for nonverbal patients):   [++++ Clinical Pain Assessment++++]  [++++Pain Severity++++]: Pain: 8  [++++Pain Character++++]: cramping  [++++Pain Duration++++]: months  [++++Pain Effect++++]: functional   [++++Pain Factors++++]: none in particular  [++++Pain Frequency++++]: on and off  [++++Pain Location++++]: upper abdomen and mid back  [++++ Clinical Pain Assessment++++]       FUNCTIONAL ASSESSMENT:     Palliative Performance Scale (PPS):  PPS: 70       PSYCHOSOCIAL/SPIRITUAL SCREENING:     Any spiritual / Baptist concerns:  [] Yes /  [x] No    Caregiver Burnout:  [] Yes /  [x] No /  [] No Caregiver Present      Anticipatory grief assessment:   [x] Normal  / [] Maladaptive       ESAS Anxiety: Anxiety: 0    ESAS Depression: Depression: 0       REVIEW OF SYSTEMS:     The following systems were [x] reviewed / [] unable to be reviewed  Systems: constitutional, ears/nose/mouth/throat, respiratory, gastrointestinal, genitourinary, musculoskeletal, integumentary, neurologic, psychiatric, endocrine. Positive findings noted below. Modified ESAS Completed by: provider   Fatigue: 5 Drowsiness: 4   Depression: 0 Pain: 8   Anxiety: 0 Nausea: 0   Anorexia: 0 Dyspnea: 3   Best Well-Bein Constipation: Yes   Other Problem (Comment): 0          PHYSICAL EXAM:     Wt Readings from Last 3 Encounters:   10/15/20 138 lb (62.6 kg)   10/06/20 140 lb 9.6 oz (63.8 kg)   20 145 lb (65.8 kg)     There were no vitals taken for this visit.   Last bowel movement: See Nursing Note    Constitutional    [x] Appears well-developed and well-nourished in no apparent distress    [] Abnormal:  Mental status  [x] Alert and awake  [x] Oriented to person/place/time  [x] Able to follow commands  [] Abnormal:   Eyes  [x] EOM normal   [x] Sclera normal   [x] No visible ocular discharge  [] Abnormal:   HENT  [x] Normocephalic, atraumatic  [x] Mouth/Throat: Moist mucous membranes   [x] External Ears normal  [] Abnormal:  Neck  [x] No visualized mass  [] Abnormal:  Pulmonary/Chest   [x] Respiratory effort normal  [x] No visualized signs of difficulty breathing or respiratory distress  [] Abnormal:  Musculoskeletal  [x] Normal gait with no signs of ataxia  [x] Normal range of motion of neck  [] Abnormal:  Neurological:   [x] No facial asymmetry (Cranial nerve 7 motor function)  [x] No gaze palsy  [] Abnormal:   Skin  [x] No significant exanthematous lesions or discoloration noted on facial skin  [] Abnormal:                                  Psychiatric  [x] Normal affect  [x] No hallucinations  [] Abnormal:    Other pertinent observable physical exam findings:    Due to this being a TeleHealth evaluation, many elements of the physical examination are unable to be assessed. HISTORY:     Past Medical History:   Diagnosis Date    Arrhythmia     Previous a.fib, ablation, NSR now; NO LONGER FOLLOWED BY CARDIOLOGIST.  Autoimmune disease ([facility]     SJOGREN'S    Cancer ([facility]     COMMON BILE DUCT, ADENOCARCINOMA    Diabetes ([facility]     Family history of skin cancer     Hypertension     Sepsis ([facility] 2017    STENTING TO BILE DUCT    Status post chemotherapy     Stroke [facility]) 2008    brain aneurysm - no deficits    Sun-damaged skin     Sunburn, blistering       Past Surgical History:   Procedure Laterality Date    HX GI      COLONOSCOPY, POLYPS (BENIGN)    HX GI  10/06/2017    Viktor Vidal [facility]     [address]  2005    Ablation     HX ORTHOPAEDIC  2000    Spinal fusion W/ HARDWARE    HX OTHER SURGICAL  11/07/2017    [place], Dr. Brett Gonsalez  2017    PORTACATH - then removed    NEUROLOGICAL PROCEDURE UNLISTED  2005    [place] hole washout from cerebral hemorrhage      Family History   Problem Relation Age of Onset    Cancer Father         Breast and Colon    Cancer Mother         LEUKEMIA    No Known Problems Sister     No Known Problems Brother     No Known Problems Sister     Anesth Problems Neg Hx       History reviewed, no pertinent family history. Social History     Tobacco Use    Smoking status: Never Smoker    Smokeless tobacco: Never Used   Substance Use Topics    Alcohol use: Never     Frequency: Never     Comment: very rare     Allergies   Allergen Reactions    Shellfish Derived Anaphylaxis     Soft shell crabs    Morphine Nausea and Vomiting    Pcn [Penicillins] Other (comments)     Pt stated \"always been told PCN\"  Pt tolerated other cephalosporins in the past      Current Outpatient Medications   Medication Sig    empagliflozin (Jardiance) 25 mg tablet Take 1 Tab by mouth nightly.     gabapentin (NEURONTIN) 100 mg capsule Take 3 capsules by mouth twice a day.  cyanocobalamin (VITAMIN B12) 1,000 mcg/mL injection INJECT CONTENTS OF ONE VIAL INTRAMUSCULARLY EVERY OTHER WEEK    tamsulosin (FLOMAX) 0.4 mg capsule TAKE ONE CAPSULE BY MOUTH EVERY EVENING    ramipriL (ALTACE) 5 mg capsule Take 1 Cap by mouth daily.  HYDROmorphone (DILAUDID) 4 mg tablet Take 1 Tab by mouth every eight (8) hours as needed for Pain for up to 30 days. Max Daily Amount: 12 mg.    lidocaine (LIDODERM) 5 % 1 Patch by TransDERmal route every twenty-four (24) hours. Apply patch to the affected area for 12 hours a day and remove for 12 hours a day.  fentaNYL 37.5 mcg/hour pt72 37.5 mcg by TransDERmal route every seventy-two (72) hours for 30 days. Max Daily Amount: 37.5 mcg.  promethazine (PHENERGAN) 25 mg tablet Take 1 Tab by mouth every six (6) hours as needed for Nausea.  lipase-protease-amylase (Creon) 24,000-76,000 -120,000 unit capsule Take 4 Caps by mouth three (3) times daily (with meals).  ondansetron (ZOFRAN ODT) 4 mg disintegrating tablet Take 1 Tab by mouth every eight (8) hours as needed for Nausea or Vomiting.  loperamide (IMODIUM) 2 mg capsule Take 2-4 mg by mouth as needed for Diarrhea. Give 4 mg after first loose stool. Give an additional 2 mg after each loose stool as needed up to a maximum of 8 doses (16 mg/day).  lipase-protease-amylase (Creon) 36,000-114,000- 180,000 unit cpDR capsule Take 1 Cap by mouth as needed (for snacks). 2-3 caps each meal and 1 cap for snacks (see additional order)    dutasteride (AVODART) 0.5 mg capsule Take 0.5 mg by mouth daily.  Creon 36,000-114,000- 180,000 unit cpDR capsule Take 2-3 Caps by mouth three (3) times daily (with meals). 2-3 caps each meal and 1 cap for snacks (see additional order)    finasteride (PROSCAR) 5 mg tablet TAKE 1 TABLET BY MOUTH EVERY DAY    ondansetron (ZOFRAN ODT) 4 mg disintegrating tablet Take 1 Tab by mouth every eight (8) hours as needed for Nausea.     acetaminophen (TYLENOL) 325 mg tablet Take 2 Tabs by mouth every four (4) hours as needed for Pain or Fever (Over the counter medication).  sucralfate (CARAFATE) 1 gram tablet Take 1 g by mouth four (4) times daily.  omeprazole (PRILOSEC) 40 mg capsule Take 1 Cap by mouth daily.  sildenafil citrate (VIAGRA) 50 mg tablet Take 1 Tab by mouth as needed (ED).  alpha-D-galactosidase (BEANO PO) Take 1 Tab by mouth two (2) times a day.  cetirizine (ZYRTEC) 10 mg tablet Take 10 mg by mouth nightly. No current facility-administered medications for this visit. LAB DATA REVIEWED:     Lab Results   Component Value Date/Time    WBC 9.9 09/20/2020 02:28 PM    HGB 13.9 10/15/2020 10:50 AM    PLATELET 550 72/51/9454 02:28 PM     Lab Results   Component Value Date/Time    Sodium 138 10/15/2020 10:50 AM    Potassium 4.4 10/15/2020 10:50 AM    Chloride 101 10/15/2020 10:50 AM    CO2 25 10/15/2020 10:50 AM    BUN 18 10/15/2020 10:50 AM    Creatinine 0.80 10/15/2020 10:50 AM    Calcium 9.3 10/15/2020 10:50 AM    Magnesium 2.4 12/31/2019 07:37 AM    Phosphorus 4.0 08/18/2018 04:20 AM      Lab Results   Component Value Date/Time    Alk.  phosphatase 296 (H) 09/20/2020 02:28 PM    Protein, total 7.1 09/20/2020 02:28 PM    Albumin 3.8 09/20/2020 02:28 PM    Globulin 3.3 09/20/2020 02:28 PM     Lab Results   Component Value Date/Time    INR 1.1 09/09/2020 02:15 AM    Prothrombin time 11.5 (H) 09/09/2020 02:15 AM    aPTT 29.9 09/09/2020 02:15 AM      Lab Results   Component Value Date/Time    Iron 31 (L) 01/02/2020 03:24 AM    TIBC 245 (L) 01/02/2020 03:24 AM    Iron % saturation 13 (L) 01/02/2020 03:24 AM    Ferritin 122 01/02/2020 03:24 AM           CONTROLLED SUBSTANCES SAFETY ASSESSMENT (IF ON CONTROLLED SUBSTANCES):     Reviewed opioid safety handout:  [x] Yes   [] No  24 hour opioid dose >150mg morphine equivalent/day:  [] Yes   [x] No  Benzodiazepines:  [] Yes   [x] No  Sleep apnea:  [] Yes   [x] No  Urine Toxicology Testing within last 6 months:  [] Yes   [x] No  History of or new aberrant medication taking behaviors:  [] Yes   [x] No  Has Narcan been prescribed [x] Yes   [] No          Total time: 70 minutes  Counseling / coordination time:   > 50% counseling / coordination?:   Consent:  He and/or health care decision maker is aware that that he may receive a bill for this telehealth service, depending on his insurance coverage, and has provided verbal consent to proceed: Yes    CPT Codes 50968-04975 for Established Patients may apply to this Telehealth Visit  Pursuant to the emergency declaration under the [address] [place] [address], [facility] waiver authority and the [employer] Act, this Virtual  Visit was conducted, with patient's consent, to reduce the patient's risk of exposure to COVID-19 and provide continuity of care for an established patient. Services were provided through a video synchronous discussion virtually to substitute for in-person clinic visit.

## 2020-10-21 NOTE — PATIENT INSTRUCTIONS
Dear Jovon Bernabe. , 
 
It was a pleasure seeing you today in your home along with your wife virtually We will see you again in 6 weeks virtually If labs or imaging tests have been ordered for you today, please call the office  at [phone] 48 hours after completion to obtain the results. Your stated goal:  
- better pain management Your described symptoms were: Fatigue: 5 Drowsiness: 4 Depression: 0 Pain: 8 Anxiety: 0 Nausea: 0 Anorexia: 0 Dyspnea: 3 Best Well-Bein Constipation: Yes Other Problem (Comment): 0 This is the plan we talked about: 1. Mid back pain and new mid upper abdominal pain - The back pain is much better with SBRT to the back 
-This pain is much better now. 2.  Severe low back pain, history of L5-S1 spinal fusion in  
-You are doing well on fentanyl 37.5 mcg patch and the plan is to try 25 mcg patches. You have 4 patches left. You will try them and let us know if you want to continue with the 25 mcg patch or go back to 37.5 mcg.   
-You continue to take tramadol 50 mg about 3 times a day for this pain. The low back pain has been chronic but it was not the main pain for which you were started on opioids. We reviewed this in detail today. You were told that you cannot have MRIs because of the hardware that was placed in . However, you had an MRI of the abdomen prior to your recent surgery. Plan is for you to get a PET scan sometime this year. Let us wait for the PET scan to see if we can find anything as the source of your new low back pain. If we do not see anything in the PET scan, I will advocate for an MRI of your lower back. -You have refills on tramadol, you occasionally use Dilaudid 2 to 4 mg for severe pain not relieved with tramadol. You have plenty of Dilaudid left so I am not providing you with a refill.  
 
3.  Postoperative pain 
-You have some pain in the right lower quadrant right next to the trocar placement. We believe it could be nerve damage  and referred pain from the surgery. You have needed tramadol for this every once in a while. Let us continue to see how you do with it. 4.  Renal colic 
-Thankfully, this is resolved. The urologist signed off on you. 5. Functional diarrhea 
-This is better since starting Creon 6. Multivitamin deficiency 
-You were found to have severe vitamin A and vitamin D deficiency. You are on replacement therapy now. Please discuss with your primary care doctor about rechecking the levels and continuing prescription level replacement. 7. We completed an AMD naming your wife as Nikko. This is what you have shared with us about Advance Care Planning: 
 
  Primary Decision Maker: Julieth Sanchez Spouse - [phone] Secondary Decision Maker: Mitchell Flannery - Child - [phone] Supplemental (Other) Decision Maker: Roberto Cristina - Child - [phone] Advance Care Planning 10/21/2020 Patient's Healthcare Decision Maker is: Named in scanned ACP document Primary Decision Maker Name -  
Primary Decision Maker Phone Number -  
Primary Decision Maker Relationship to Patient -  
Confirm Advance Directive Yes, on file Patient Would Like to Complete Advance Directive - Does the patient have other document types - The Palliative Medicine Team is here to support you and your family.   
 
 
Sincerely, 
 
 
Bijal Rick MD and the Palliative Medicine Team

## 2020-10-21 NOTE — PROGRESS NOTES
Palliative Medicine Office Visit  Palliative Medicine Nurse Check In  [phone]    Patient Name: Kaylie Duran. YOB: 1955      Date of Office Visit:     Patient states: \"  \"    From Check In Sheet (scanned in Media):  Has a medical provider talked with you about cardiopulmonary resuscitation (CPR)? [] Yes   [] No   [] Unable to obtain    Nurse reminder to complete or update ACP FlowSheet:    Is ACP on the Problem List?    [] Yes    [] No  IF ACP Document is ON FILE; Nurse to place ACP on Problem List     Is there an ACP Note in Chart Review/Note? [] Yes    [] No   If NO: ALERT PROVIDER       Primary Decision MakerDot Aguila - [phone]    Secondary Decision Maker: Ridge Cristina III - Child - [phone]    Supplemental (Other) Decision Maker: Lynn Verde Child - [phone]  Advance Care Planning 10/21/2020   Patient's [address] is: Named in scanned ACP document   Primary Decision Maker Name -   Primary Decision Maker Phone Number -   Primary Decision Maker Relationship to Patient -   Confirm Advance Directive Yes, on file   Patient Would Like to Complete Advance Directive -   Does the patient have other document types -         Is there anything that we should know about you as a person in order to provide you the best care possible? Have you been to the ER, urgent care clinic since your last visit? [] Yes   [x] No   [] Unable to obtain    Have you been hospitalized since your last visit? [] Yes   [x] No   [] Unable to obtain    Have you seen or consulted any other health care providers outside of the [address] [place] [address] since your last visit?    [] Yes   [x] No   [] Unable to obtain    Functional status (describe):         Last BM: 10/20/2020     accessed (date): 10/21/2020    Bottle review (for opioid pain medication):  Medication 1:   Date filled:   Directions:   # filled:   # left:   # pills taking per day:  Last dose taken:     Medication 2:   Date filled:   Directions:   # filled:   # left:   # pills taking per day:  Last dose taken:    Medication 3:   Date filled:   Directions:   # filled:   # left:   # pills taking per day:  Last dose taken:    Medication 4:   Date filled:   Directions:   # filled:   # left:   # pills taking per day:  Last dose taken:

## 2020-10-23 ENCOUNTER — TELEPHONE (OUTPATIENT)
Dept: INTERNAL MEDICINE CLINIC | Age: 65
End: 2020-10-23

## 2020-10-23 NOTE — TELEPHONE ENCOUNTER
----- Message from [place] sent at 10/23/2020  8:09 AM EDT -----  Regarding: Dr. Fitzgerald Saliva  Appointment not available    Caller's first and last name and relationship to patient (if not the patient): Corbin Villareal contact number: [phone]      Preferred date and time: first available in office      Scheduled appointment date and time: n/a      Reason for appointment: Groin pain.   Possible hernia - large bulging area near pain site      Message from Ed

## 2020-10-26 ENCOUNTER — TELEPHONE (OUTPATIENT)
Dept: INTERNAL MEDICINE CLINIC | Age: 65
End: 2020-10-26

## 2020-10-26 ENCOUNTER — OFFICE VISIT (OUTPATIENT)
Dept: INTERNAL MEDICINE CLINIC | Age: 65
End: 2020-10-26
Payer: COMMERCIAL

## 2020-10-26 ENCOUNTER — PATIENT OUTREACH (OUTPATIENT)
Dept: OTHER | Age: 65
End: 2020-10-26

## 2020-10-26 VITALS
SYSTOLIC BLOOD PRESSURE: 113 MMHG | WEIGHT: 144 LBS | TEMPERATURE: 97 F | RESPIRATION RATE: 12 BRPM | HEIGHT: 68 IN | BODY MASS INDEX: 21.82 KG/M2 | OXYGEN SATURATION: 97 % | DIASTOLIC BLOOD PRESSURE: 74 MMHG | HEART RATE: 87 BPM

## 2020-10-26 DIAGNOSIS — C22.1 CHOLANGIOCARCINOMA OF BILIARY TRACT (HCC): ICD-10-CM

## 2020-10-26 DIAGNOSIS — E11.9 TYPE 2 DIABETES MELLITUS WITHOUT COMPLICATION, WITHOUT LONG-TERM CURRENT USE OF INSULIN (HCC): ICD-10-CM

## 2020-10-26 DIAGNOSIS — I10 ESSENTIAL HYPERTENSION: ICD-10-CM

## 2020-10-26 DIAGNOSIS — R79.89 LOW TESTOSTERONE LEVEL IN MALE: ICD-10-CM

## 2020-10-26 DIAGNOSIS — K40.90 RIGHT INGUINAL HERNIA: Primary | ICD-10-CM

## 2020-10-26 DIAGNOSIS — R53.83 OTHER FATIGUE: ICD-10-CM

## 2020-10-26 PROCEDURE — 99214 OFFICE O/P EST MOD 30 MIN: CPT | Performed by: INTERNAL MEDICINE

## 2020-10-26 RX ORDER — VITAMIN A 3000 MCG
10000 CAPSULE ORAL DAILY
COMMUNITY
End: 2020-10-26 | Stop reason: SDUPTHER

## 2020-10-26 RX ORDER — VITAMIN E 268 MG
400 CAPSULE ORAL DAILY
Qty: 90 CAP | Refills: 3 | Status: SHIPPED | OUTPATIENT
Start: 2020-10-26

## 2020-10-26 RX ORDER — VITAMIN A 3000 MCG
10000 CAPSULE ORAL DAILY
Qty: 90 CAP | Refills: 3 | Status: SHIPPED | OUTPATIENT
Start: 2020-10-26 | End: 2021-01-01

## 2020-10-26 RX ORDER — VITAMIN E 268 MG
CAPSULE ORAL DAILY
COMMUNITY
End: 2020-10-26 | Stop reason: SDUPTHER

## 2020-10-26 NOTE — PROGRESS NOTES
Kaylie Duran. is a 60 y.o. male who presents for evaluation of bulge in right groin. Last seen by me oct 15, 2020. Bulge was first noticed about a week ago, though he has struggled with some pain in that location for about 2 months now. Weight is up 6 lbs since last visit. His last abd surgery thus far has really helped his n/v.  His low bp has also improved with decrease in dose of altace. ROS:  Constitutional: negative for fevers, chills, anorexia and weight loss  Eyes:   negative for visual disturbance and irritation  ENT:   negative for tinnitus,sore throat,nasal congestion,ear pain,hoarseness  Respiratory:  negative for cough, hemoptysis, dyspnea,wheezing  CV:   negative for chest pain, palpitations, lower extremity edema  GI:   negative for nausea, vomiting, diarrhea, abdominal pain,melena  Genitourinary: negative for frequency, dysuria and hematuria  Musculoskel: negative for myalgias, arthralgias, back pain, muscle weakness, joint pain  Neurological:  negative for headaches, dizziness, focal weakness, numbness  Psychiatric:     Negative for depression or anxiety      Past Medical History:   Diagnosis Date    Arrhythmia     Previous a.fib, ablation, NSR now; NO LONGER FOLLOWED BY CARDIOLOGIST.     Autoimmune disease ([address]     SJOGREN'S    Cancer ([address]     COMMON BILE DUCT, ADENOCARCINOMA    Diabetes ([address]     Family history of skin cancer     Hypertension     Sepsis ([address] 2017    STENTING TO BILE DUCT    Status post chemotherapy     Stroke [facility]) 2008    brain aneurysm - no deficits    Sun-damaged skin     Sunburn, blistering        Past Surgical History:   Procedure Laterality Date    HX GI      COLONOSCOPY, POLYPS (BENIGN)    HX GI  10/06/2017    [facility]     [address]  2005    Ablation     HX ORTHOPAEDIC  2000    Spinal fusion W/ HARDWARE    HX OTHER SURGICAL  11/07/2017    [place] Cath, Dr. Vásquez Seen  2017    PORTACAT - then removed    NEUROLOGICAL PROCEDURE UNLISTED  2005    Ting hole washout from cerebral hemorrhage       Family History   Problem Relation Age of Onset    Cancer Father         Breast and Colon    Cancer Mother         LEUKEMIA    No Known Problems Sister     No Known Problems Brother     No Known Problems Sister     Anesth Problems Neg Hx        Social History     Socioeconomic History    Marital status:      Spouse name: Not on file    Number of children: Not on file    Years of education: Not on file    Highest education level: Not on file   Occupational History    Not on file   Social Needs    Financial resource strain: Not on file    Food insecurity     Worry: Not on file     Inability: Not on file    Transportation needs     Medical: Not on file     Non-medical: Not on file   Tobacco Use    Smoking status: Never Smoker    Smokeless tobacco: Never Used   Substance and Sexual Activity    Alcohol use: Never     Frequency: Never     Comment: very rare    Drug use: No    Sexual activity: Yes     Partners: Female   Lifestyle    Physical activity     Days per week: Not on file     Minutes per session: Not on file    Stress: Not on file   Relationships    Social connections     Talks on phone: Not on file     Gets together: Not on file     Attends [employer] service: Not on file     Active member of club or organization: Not on file     Attends meetings of clubs or organizations: Not on file     Relationship status: Not on file    Intimate partner violence     Fear of current or ex partner: Not on file     Emotionally abused: Not on file     Physically abused: Not on file     Forced sexual activity: Not on file   Other Topics Concern    Not on file   Social History Narrative    Not on file            Visit Vitals  /74 (BP 1 Location: Left arm, BP Patient Position: Sitting)   Pulse 87   Temp 97 °F (36.1 °C) (Temporal)   Resp 12   Ht 5' 8\" (1.727 m)   Wt 144 lb (65.3 kg)   SpO2 97% BMI 21.90 kg/m²       Physical Examination:   General - Well appearing male  HEENT - PERRL, TM no erythema/opacification, normal nasal turbinates, no oropharyngeal erythema or exudate, MMM  Neck - supple, no bruits, no thyroidomegaly, no lymphadenopathy  Pulm - clear to auscultation bilaterally  Cardio - RRR, normal S1 S2, no murmur  Abd - soft, nontender, no masses, no HSM  Extrem - no edema, +2 distal pulses. ++right inguinal hernia  Neuro-  No focal deficits, CN intact     Assessment/Plan:    1. Right inguinal hernia--referral to gen sx, dr Jett cee, who has done his recent eren en y sx as well. 2.  Fatigue--? Due to low T. Lab slip given to check again  3.  Dm, type 2--doing well with jardiance  4. Chronic pain--improving, follows with palliative care now, on dilaudid, fentanyl patches  5. Hx pancreatic cancer, adenoca of bile duct--sp initially whipple procedure, most recently revised to eren en y procedure  6.    bph--continue flomax    rtc for regular visit        Lisa Crawford III, DO

## 2020-10-26 NOTE — TELEPHONE ENCOUNTER
Appointment scheduled for today @ 145 with Dr. Mildred Ordoñez.   Pt verbalized understanding of information discussed w/ no further questions at this time.

## 2020-10-26 NOTE — TELEPHONE ENCOUNTER
#[phone]  Pt called on Friday, 10-23-30 (didn't see note)      Pt has a hernia in his right side groin area. Please call to advise as pt needs to be seen or VV as soon as possible.

## 2020-10-26 NOTE — PROGRESS NOTES
[place]  Wrap Up/  Start CCM episode. Patient with cholangiocarcinoma s/p pylorus-preserving Whipple 10/2017; G_J Geroge-EN Y revision 09/02/2020, HTN, DM, BPH and chronic LBP. Patient was admitted to [facility] on 09/02/2020 and discharged on 09/11/2020 for G_J George-EN Y revision. Resolving current episode (Transitions of care complete). No further ED/UC or hospital admissions within 30 days post discharge. Patient attended follow-up appointments as directed. Starting CCM episode for cholangiocarcinoma with chronic LBP. CCM progress note    Patient eligible for [place] [address] care management    Two patient identifiers verified. Discussed the care management program.  Patient agrees to care management services at this time. Patient's primary care provider relationship reviewed with patient and modified, as applicable. Patient has significant diagnosis of cholangiocarcinoma s/p pylorus-preserving Whipple 10/2017; G_J George-EN Y revision 09/02/2020, HTN, DM, BPH and chronic LBP. He is concerned with Low energy since last surgery and chronic LBP. * Bringing in crops now. Corn is in- starting on soybeans this week. - Hopes to be completed by [employer]. * Mrs. Sheila Stokes now works from home. - PhD is on hold due to restructuring nursing school to virtual classes. Care management assessment updated. Patient stated problem: \"I don't know what to do about this low back pain. \"    Care manager identified primary concern: Oncology patient- risk for metastatic disease. Emotional Status/Adjustment to Health State: Stable, friendly- in good spirits.      Readiness to Change: []  Pre-contemplative    []  Contemplative  []  Preparation               [x]  Action                  []  Maintenance    Barriers/Challenges to Care: []  Decline in memory    []  Language barrier     []  Emotional                  []  Limited mobility  []  Lack of motivation     [] Vision, hearing or cognitive impairment []  Knowledge [] Financial Barriers  [x]  Other - Pain/ chronic nature of disease    Patient stated goal /  Care Manager/Provider identified medical goal:      Care Coordination for chronic LBP since surgery. Support System/Resources: Wife is a RN;  Sons assist with farming duties - Family bubble for COVID precautions. Advance Care Planning: In place 4/20/2020. ADLS/DME:  None- has cane if needed. Referrals: None at this time. Upcoming appointments:   Future Appointments   Date Time Provider Sloane Marley   12/2/2020 10:30 AM Lennie Holstein, MD [facility] BS AMB     Focused Assessment-  Patient declines. 4. Key pt activities to achieve better health:   []  Weight loss  []  Improved diabetic control  []  Decreased cholesterol levels  []  Decreased blood pressure  [x]  Identify source of Back Pain. []    Patient verbalized understanding of all information discussed. Pt has my contact information for any further questions, concerns, or needs. Plan for next call:  Friday 11/20.  - FU on back pain. Chart Review:  PCP OV 10/15 ;  Palliative VV 10/22    Overall labs look good. Dm well controlled, a1c 5.5. Total testosterone level is ok, but 'free' level, which is considered the active part, is low. If interested in replacement therapy, then we need a 2nd low fasting level. I put in order, just fax to whatever lab liv he would want to go to. Blayne Benavidez. is a 60 y.o. male who presents for evaluation of hospital follow up. Last seen by me April 6, 2020 in virtual visit. He continued to struggle with weight loss, and n/v. Was inpt again stm from sept 2-11, and sx to transform his whipple to eren-en y in an attempt to help with his continued n/v. It seems to have worked well, as since then he has done much better, food is staying down, and he is gaining some weight back.   He did have some kidney stones that knocked him down some, but he is doing better now. Complains today of no energy to do much of anything. Also continues to have some point tenderness in lower back, as well as right groin.     Visit Vitals  /67 (BP 1 Location: Right arm, BP Patient Position: Sitting)   Pulse 67   Temp (!) 96.4 °F (35.8 °C) (Temporal)   Resp 14   Ht 5' 8\" (1.727 m)   Wt 138 lb (62.6 kg)   SpO2 97%   BMI 20.98 kg/m²      Assessment/Plan:     1. Fatigue--check h/h, bmp, testosterone, tsh  2.  Relative hypotension--decrease dose of altace from 10 to 5 mg  3. Dm, type 2--doing well with just jardiance. Check a1c, urine micro  4. Low back pain--rx for lidoderm patch  5.  pafib--sp ablation  6. bph--on proscar  7. Pancreatic cancer--sp whipple, then converted to eren en y. On creon. 8.  Chronic pain--on fentanyl patch and dilaudid. Follows with palliative care now     Flu shot given today. rtc 3 months      Palliative VV  SUMMARY:   Cr Stubbs is a 60y.o. 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 Augie Juarez for management of symptoms and psychosocial support. 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 This is second marriage for both of them.     Current treatment-completed RT to the para-aortic mass, pursuing diagnostics with GI physician Dr. Carlos Mclain for gastroparesis and pancreatic enzymes, has had biliary stents replaced.   Status post celiac plexus block and reversal of Whipple procedure on 9/9/2020     Patient had an ER visit on 9/20/2020 for severe flank pain which was again thought to be from renal colic. Repeat CT in the ER did not show any more kidney stones. This is the plan we talked about:     1. Mid back pain and new mid upper abdominal pain  - The back pain is much better with SBRT to the back  -This pain is much better now.     2. Severe low back pain, history of L5-S1 spinal fusion in 2000  -You are doing well on fentanyl 37.5 mcg patch and the plan is to try 25 mcg patches. You have 4 patches left. You will try them and let us know if you want to continue with the 25 mcg patch or go back to 37.5 mcg.    -You continue to take tramadol 50 mg about 3 times a day for this pain. The low back pain has been chronic but it was not the main pain for which you were started on opioids. We reviewed this in detail today. You were told that you cannot have MRIs because of the hardware that was placed in 2000. However, you had an MRI of the abdomen prior to your recent surgery. Plan is for you to get a PET scan sometime this year. Let us wait for the PET scan to see if we can find anything as the source of your new low back pain. If we do not see anything in the PET scan, I will advocate for an MRI of your lower back. -You have refills on tramadol, you occasionally use Dilaudid 2 to 4 mg for severe pain not relieved with tramadol. You have plenty of Dilaudid left so I am not providing you with a refill.     3. Postoperative pain  -You have some pain in the right lower quadrant right next to the trocar placement. We believe it could be nerve damage  and referred pain from the surgery. You have needed tramadol for this every once in a while. Let us continue to see how you do with it.     4.  Renal colic  -Thankfully, this is resolved. The urologist signed off on you.     5.  Functional diarrhea  -This is better since starting Creon     6.  Multivitamin deficiency  -You were found to have severe vitamin A and vitamin D deficiency. You are on replacement therapy now. Please discuss with your primary care doctor about rechecking the levels and continuing prescription level replacement.     7. We completed an AMD naming your wife as Nikko.

## 2020-10-26 NOTE — PATIENT INSTRUCTIONS
Fatigue: Care Instructions Your Care Instructions Fatigue is a feeling of tiredness, exhaustion, or lack of energy. You may feel fatigue because of too much or not enough activity. It can also come from stress, lack of sleep, boredom, and poor diet. Many medical problems, such as viral infections, can cause fatigue. Emotional problems, especially depression, are often the cause of fatigue. Fatigue is most often a symptom of another problem. Treatment for fatigue depends on the cause. For example, if you have fatigue because you have a certain health problem, treating this problem also treats your fatigue. If depression or anxiety is the cause, treatment may help. Follow-up care is a key part of your treatment and safety. Be sure to make and go to all appointments, and call your doctor if you are having problems. It's also a good idea to know your test results and keep a list of the medicines you take. How can you care for yourself at home? · Get regular exercise. But don't overdo it. Go back and forth between rest and exercise. · Get plenty of rest. 
· Eat a healthy diet. Do not skip meals, especially breakfast. 
· Reduce your use of caffeine, tobacco, and alcohol. Caffeine is most often found in coffee, tea, cola drinks, and chocolate. · Limit medicines that can cause fatigue. This includes tranquilizers and cold and allergy medicines. When should you call for help? Watch closely for changes in your health, and be sure to contact your doctor if: 
  · You have new symptoms such as fever or a rash.  
  · Your fatigue gets worse.  
  · You have been feeling down, depressed, or hopeless. Or you may have lost interest in things that you usually enjoy.  
  · You are not getting better as expected. Where can you learn more? Go to http://www.gray.com/ Enter R898 in the search box to learn more about \"Fatigue: Care Instructions. \" 
 Current as of: June 26, 2019               Content Version: 12.6 © 6176-4710 [employer]. Care instructions adapted under license by [employer] (which disclaims liability or warranty for this information). If you have questions about a medical condition or this instruction, always ask your healthcare professional. [address] any warranty or liability for your use of this information. Hypogonadism: Care Instructions Your Care Instructions Men who have hypogonadism do not make enough testosterone. This hormone allows men to make sperm and to have normal physical male traits. The condition also is known as testosterone deficiency. It can lead to loss of sex drive, weakness, impotence, infertility, and weakened bones. Many things can cause this condition. Causes include injured testicles, certain medicines, an infection, and aging. Having a long-term health problem such as kidney or liver disease or being obese can cause it. So can surgery or radiation treatment for another health problem. It also can be present at birth. It is most often treated with testosterone hormone. You can get the hormone as a shot or through a patch or gel on the skin. Follow-up care is a key part of your treatment and safety. Be sure to make and go to all appointments, and call your doctor if you are having problems. It's also a good idea to know your test results and keep a list of the medicines you take. How can you care for yourself at home? · Take your medicines exactly as prescribed. Call your doctor if you think you are having a problem with your medicine. You will get more details on the specific medicines your doctor prescribes. · Follow your treatment plan. If you use testosterone hormones, follow your doctor's instructions. Hormones can help relieve many of the effects of this condition, such as impotence.  But it may take weeks or months for your symptoms to improve. · Get plenty of exercise. And make sure to get plenty of calcium and vitamin D in your diet. Eat more dairy foods and green vegetables. They can help keep your bones from getting weak. · If you have a hard time dealing with this condition, talk to your doctor about joining a support group. Talking with others who have the same problems can help you cope. When should you call for help? Call your doctor now or seek immediate medical care if: 
  · You have headaches.  
  · You have problems with your vision. Watch closely for changes in your health, and be sure to contact your doctor if: 
  · You have trouble getting or keeping an erection.  
  · You have a loss of body hair.  
  · You feel weak or tired a lot of the time.  
  · Your breasts are getting larger.  
  · You do not get better as expected. Where can you learn more? Go to http://www.gray.com/ Enter 99 [id number] in the search box to learn more about \"Hypogonadism: Care Instructions. \" Current as of: March 31, 2020               Content Version: 12.6 © 8757-2230 [employer]. Care instructions adapted under license by [employer] (which disclaims liability or warranty for this information). If you have questions about a medical condition or this instruction, always ask your healthcare professional. [address] any warranty or liability for your use of this information. Get a 2nd fasting testosterone lab draw done. Also check with your insurance to see what type of replacement your insurance covers.

## 2020-10-27 ENCOUNTER — DOCUMENTATION ONLY (OUTPATIENT)
Dept: SURGERY | Age: 65
End: 2020-10-27

## 2020-10-30 ENCOUNTER — PATIENT MESSAGE (OUTPATIENT)
Dept: PALLATIVE CARE | Age: 65
End: 2020-10-30

## 2020-10-30 LAB
TESTOST FREE SERPL-MCNC: 4 PG/ML (ref 6.6–18.1)
TESTOST SERPL-MCNC: 398 NG/DL (ref 264–916)

## 2020-10-30 NOTE — TELEPHONE ENCOUNTER
Returned call to Mrs. Cristina - she stated that MD decreased patient Fentanyl Patch from 37.5 mcg down to 25 mcg, but patient requesting to discontinue all together. Patch was changed yesterday. Wife inquire if ok to use the 12.5 mcg patch that she have on hand when he is next due to be changed and if no increase pain then stop? Advised as MD is weaning off, and patient want to stop using ok to do that. Advised to call our office next week if pain returns or if he wants to go back to the 25 mcg patch.   Wife verbalized understanding

## 2020-10-30 NOTE — TELEPHONE ENCOUNTER
----- Message from Benji. Padmini Tolentino. sent at 10/30/2020 11:15 AM EDT -----  Regarding: Prescription Question  Contact: [phone]  Ton Wang is on the Fentanyl 25mcg patches. He said he feels like he wants to stop using. He said he did notice a difference between 37.5mcg and 25mcg patch. He is having back pain and the lidocaine patch is helping. The pain from the inguinal hernia pain is relieved by the Tramadol. He saw his PCP last week about the hernia. Mateo Trujillo has an appointment with Dr. Tone Marrufo on 11/17/20. He has one Fentanyl 25mcg patch( he had 3 when we started tapering from the 37.5 dose). He has  one Fentanyl 12.5 mcg patch. We had to switch to a new pharmacy due to insurance. [address] [place] [address] is our new pharmacy. The phone number is [phone]. Thank you for your support.   Mateo Trujillo and Carroll Lee

## 2020-11-01 NOTE — PROGRESS NOTES
Testosterone levels remain low. If interested in treatment, check with your insurance plan, and see what replacement they cover, and then let me know.

## 2020-11-02 ENCOUNTER — TELEPHONE (OUTPATIENT)
Dept: INTERNAL MEDICINE CLINIC | Age: 65
End: 2020-11-02

## 2020-11-02 RX ORDER — CIPROFLOXACIN 500 MG/1
500 TABLET ORAL 2 TIMES DAILY
Qty: 20 TAB | Refills: 0 | Status: SHIPPED | OUTPATIENT
Start: 2020-11-02 | End: 2020-11-12

## 2020-11-02 NOTE — TELEPHONE ENCOUNTER
Eva Diez, DO  You 58 minutes ago (1:08 PM)      I have never heard of hernia causing this problem.     Much more likely due to epididymitis.  Rx sent in for cipro.  If worsens he needs to go to ED.     Message text      Notified pt via voicemail

## 2020-11-02 NOTE — TELEPHONE ENCOUNTER
Received triage call from pt. Pt c/o very swollen and painful testicles since Saturday. Pt unsure if this related to his hernia. Notified pt a message will be sent to Dr. Kosta Garza. Pt verbalized understanding of information discussed w/ no further questions at this time.

## 2020-11-16 ENCOUNTER — TELEPHONE (OUTPATIENT)
Dept: ONCOLOGY | Age: 65
End: 2020-11-16

## 2020-11-16 ENCOUNTER — TELEPHONE (OUTPATIENT)
Dept: PALLATIVE CARE | Age: 65
End: 2020-11-16

## 2020-11-16 DIAGNOSIS — N23 RENAL COLIC: ICD-10-CM

## 2020-11-16 RX ORDER — PANCRELIPASE 36000; 180000; 114000 [USP'U]/1; [USP'U]/1; [USP'U]/1
2-3 CAPSULE, DELAYED RELEASE PELLETS ORAL
Status: CANCELLED | OUTPATIENT
Start: 2020-11-16

## 2020-11-16 RX ORDER — FENTANYL 37.5 UG/H
37.5 PATCH, EXTENDED RELEASE TRANSDERMAL
Qty: 5 PATCH | Refills: 0 | Status: SHIPPED | OUTPATIENT
Start: 2020-11-16 | End: 2020-12-02 | Stop reason: ALTCHOICE

## 2020-11-16 NOTE — TELEPHONE ENCOUNTER
Palliative Medicine  Nursing Note  [phone]  Fax [phone]      Telephone Call  Patient Name: Ajit Barger. YOB: [birth year]        Primary Decision Maker: Bijan Garcia - Spouse - [phone]    Secondary Decision Maker: Ridge Cristina III - Child - [phone]    Supplemental (Other) Decision Maker: Royal Nation Child - [phone]   Advance Care Planning 10/21/2020   Patient's Jas is: Named in scanned ACP document   Primary Decision Maker Name -   Primary Decision Maker Phone Number -   Primary Decision Maker Relationship to Patient -   Confirm Advance Directive Yes, on file   Patient Would Like to Complete Advance Directive -   Does the patient have other document types -     Returned call to patient and he stated that back pain has gotten unbearable since last Thursday. Patient reported that he has stop using Fentanyl patch and pain medications as he was doing better. Patient stated that he put a patch back on yesterday (Fentanyl 37.5 mcg) and restarted his Dilaudid 2 mg (2 tabs) every 6 hours - last dose was yesterday. Advised that he should go back to taking Dilaudid as schedule, and Tramadol as needed. Patient requested refill on Fentanyl as he only has 1 patch remaining. Patient will keep pain log, and this nurse will call on Wednesday to follow up on pain control.     Information was shared with Dr. Alona Torres, RN  Palliative Medicine

## 2020-11-16 NOTE — TELEPHONE ENCOUNTER
Message sent to Bessy Arian, to reschedule pt for PET scan and to determine if peer to peer is needed or not this time since scheduled at a later date. Called pt. No answer. Left detailed VM explaining a message was sent to schedulers, we will have to determine what is needed when scheduled.

## 2020-11-16 NOTE — TELEPHONE ENCOUNTER
Patient called and left message on [employer] voicemail stating he would like to get a PET scan before the end of the year for insurance purposes.  Please return call

## 2020-11-17 ENCOUNTER — OFFICE VISIT (OUTPATIENT)
Dept: SURGERY | Age: 65
End: 2020-11-17
Payer: COMMERCIAL

## 2020-11-17 VITALS
WEIGHT: 147.6 LBS | TEMPERATURE: 98.2 F | HEART RATE: 87 BPM | HEIGHT: 68 IN | SYSTOLIC BLOOD PRESSURE: 110 MMHG | RESPIRATION RATE: 17 BRPM | OXYGEN SATURATION: 97 % | BODY MASS INDEX: 22.37 KG/M2 | DIASTOLIC BLOOD PRESSURE: 66 MMHG

## 2020-11-17 DIAGNOSIS — K40.90 RIGHT INGUINAL HERNIA: Primary | ICD-10-CM

## 2020-11-17 PROCEDURE — 99214 OFFICE O/P EST MOD 30 MIN: CPT | Performed by: SURGERY

## 2020-11-17 NOTE — PROGRESS NOTES
1. Have you been to the ER, urgent care clinic since your last visit? Hospitalized since your last visit? No     2. Have you seen or consulted any other health care providers outside of the [address] [place] [address] since your last visit? Include any pap smears or colon screening.  No

## 2020-11-17 NOTE — LETTER
11/17/20 Patient: Jovon Bernabe. YOB: 1955 Date of Visit: 11/17/2020 Professor Carine Lester DO 
[address] [facility] [address] [address] VIA In Basket Dear Professor Carine Lester DO, Thank you for referring Mr. Janeen Rodney to [facility] [address] for evaluation. My notes for this consultation are attached. If you have questions, please do not hesitate to call me. I look forward to following your patient along with you.  
 
 
Sincerely, 
 
Cassy Luna MD

## 2020-11-17 NOTE — PROGRESS NOTES
[facility] History and Physical    Chief Complaint: Right inguinal hernia    History of Present Illness:      Ifrah Meza is a 60 y.o. male who is known to me for a history of distal cholangiocarcinoma. He is s/p a pylorus preserving whipple procedure on 10/6/17, final pathology [id number]. He completed adjuvant therapy after this. He has had issues with an anastomotic G-J stricture and more recently has had recurrent pancreatitis. He had an ERP with pancreatic stent placement which was repeated on 4/1/20. He has also had stereotactic radiation therapy for localized recurrence of his cholangiocarcinoma along the retroperitoneum. The patient is now status post revision of his duodenojejunostomy to a George-en-Y reconstruction due to ongoing chronic bilious vomiting which was performed on 9/9/2020. The patient has been doing well since his last surgery but more recently developed pain in the right inguinal and scrotal areas. He was seen by his urologist and treated for epididymitis. This is improved his scrotal pain but the patient continues to have some discomfort higher up in the inguinal region. More recently in the last week or 2 he has noticed a bulge there which spontaneously reduces. He has not had a previous inguinal hernia. He denies any obstructive or incarceration type symptoms. He does complain of some low right-sided back pain but otherwise is doing quite well given his history of cholangiocarcinoma and extensive surgery/therapies for this. Past Medical History:   Diagnosis Date    Arrhythmia     Previous a.fib, ablation, NSR now; NO LONGER FOLLOWED BY CARDIOLOGIST.     Autoimmune disease ([address]     SJOGREN'S    Cancer ([address]     COMMON BILE DUCT, ADENOCARCINOMA    Diabetes ([address]     Family history of skin cancer     Hypertension     Sepsis ([address] 2017    STENTING TO BILE DUCT    Status post chemotherapy     Stroke [facility]) 2008    brain aneurysm - no deficits  Sun-damaged skin     Sunburn, blistering        Past Surgical History:   Procedure Laterality Date    HX GI      COLONOSCOPY, POLYPS (BENIGN)    HX GI  10/06/2017    Viktor D[facility] 66  2005    Ablation     HX ORTHOPAEDIC  2000    Spinal fusion W/ HARDWARE    HX OTHER SURGICAL  11/07/2017    [place] Yaquelin, Dr. Julian Garcia  2017    PORTACATH - then removed    NEUROLOGICAL PROCEDURE UNLISTED  2005    Dixon hole washout from cerebral hemorrhage       Social History     Socioeconomic History    Marital status:      Spouse name: Not on file    Number of children: Not on file    Years of education: Not on file    Highest education level: Not on file   Occupational History    Not on file   Social Needs    Financial resource strain: Not on file    Food insecurity     Worry: Not on file     Inability: Not on file    Transportation needs     Medical: Not on file     Non-medical: Not on file   Tobacco Use    Smoking status: Never Smoker    Smokeless tobacco: Never Used   Substance and Sexual Activity    Alcohol use: Never     Frequency: Never     Comment: very rare    Drug use: No    Sexual activity: Yes     Partners: Female   Lifestyle    Physical activity     Days per week: Not on file     Minutes per session: Not on file    Stress: Not on file   Relationships    Social connections     Talks on phone: Not on file     Gets together: Not on file     Attends Jehovah's witness service: Not on file     Active member of club or organization: Not on file     Attends meetings of clubs or organizations: Not on file     Relationship status: Not on file    Intimate partner violence     Fear of current or ex partner: Not on file     Emotionally abused: Not on file     Physically abused: Not on file     Forced sexual activity: Not on file   Other Topics Concern    Not on file   Social History Narrative    Not on file       Family History   Problem Relation Age of Onset    Cancer Father         Breast and Colon    Cancer Mother         LEUKEMIA    No Known Problems Sister     No Known Problems Brother     No Known Problems Sister     Anesth Problems Neg Hx          Current Outpatient Medications:     fentaNYL 37.5 mcg/hour pt72, 37.5 mcg by TransDERmal route every seventy-two (72) hours for 30 days. Max Daily Amount: 37.5 mcg., Disp: 5 Patch, Rfl: 0    vitamin A (AQUASOL A) 10,000 unit capsule, Take 1 Cap by mouth daily. , Disp: 90 Cap, Rfl: 3    vitamin E (AQUA GEMS) 400 unit capsule, Take 1 Cap by mouth daily. , Disp: 90 Cap, Rfl: 3    empagliflozin (Jardiance) 25 mg tablet, Take 1 Tab by mouth nightly., Disp: 90 Tab, Rfl: 3    gabapentin (NEURONTIN) 100 mg capsule, Take 3 capsules by mouth twice a day., Disp: 540 Cap, Rfl: 3    cyanocobalamin (VITAMIN B12) 1,000 mcg/mL injection, INJECT CONTENTS OF ONE VIAL INTRAMUSCULARLY EVERY OTHER WEEK, Disp: 6 mL, Rfl: 6    tamsulosin (FLOMAX) 0.4 mg capsule, TAKE ONE CAPSULE BY MOUTH EVERY EVENING, Disp: 90 Cap, Rfl: 3    ramipriL (ALTACE) 5 mg capsule, Take 1 Cap by mouth daily. , Disp: 90 Cap, Rfl: 3    lidocaine (LIDODERM) 5 %, 1 Patch by TransDERmal route every twenty-four (24) hours. Apply patch to the affected area for 12 hours a day and remove for 12 hours a day., Disp: 30 Each, Rfl: 5    lipase-protease-amylase (Creon) 24,000-76,000 -120,000 unit capsule, Take 4 Caps by mouth three (3) times daily (with meals). , Disp: 180 Cap, Rfl: 1    loperamide (IMODIUM) 2 mg capsule, Take 2-4 mg by mouth as needed for Diarrhea. Give 4 mg after first loose stool. Give an additional 2 mg after each loose stool as needed up to a maximum of 8 doses (16 mg/day). , Disp: , Rfl:     lipase-protease-amylase (Creon) 36,000-114,000- 180,000 unit cpDR capsule, Take 1 Cap by mouth as needed (for snacks).  2-3 caps each meal and 1 cap for snacks (see additional order), Disp: , Rfl:     dutasteride (AVODART) 0.5 mg capsule, Take 0.5 mg by mouth daily. , Disp: , Rfl:     Creon 36,000-114,000- 180,000 unit cpDR capsule, Take 2-3 Caps by mouth three (3) times daily (with meals). 2-3 caps each meal and 1 cap for snacks (see additional order), Disp: , Rfl:     finasteride (PROSCAR) 5 mg tablet, TAKE 1 TABLET BY MOUTH EVERY DAY, Disp: , Rfl:     acetaminophen (TYLENOL) 325 mg tablet, Take 2 Tabs by mouth every four (4) hours as needed for Pain or Fever (Over the counter medication). , Disp: 1 Tab, Rfl: 0    sildenafil citrate (VIAGRA) 50 mg tablet, Take 1 Tab by mouth as needed (ED)., Disp: 30 Tab, Rfl: 1    alpha-D-galactosidase (BEANO PO), Take 1 Tab by mouth two (2) times a day., Disp: , Rfl:     cetirizine (ZYRTEC) 10 mg tablet, Take 10 mg by mouth nightly., Disp: , Rfl:     Allergies   Allergen Reactions    Shellfish Derived Anaphylaxis     Soft shell crabs    Morphine Nausea and Vomiting    Pcn [Penicillins] Other (comments)     Pt stated \"always been told PCN\"  Pt tolerated other cephalosporins in the past       ROS   Constitutional: Negative  Ears, Nose, Mouth, Throat, and Face: Negative  Respiratory: Negative  Cardiovascular: Negative  Gastrointestinal: Negative  Genitourinary: As in HPI  Integument/Breast: Negative  Hematologic/Lymphatic: Negative  Behavioral/Psychiatric: Negative  Allergic/Immunologic: Negative      Physical Exam:     Visit Vitals  /66 (BP 1 Location: Left arm, BP Patient Position: Sitting)   Pulse 87   Temp 98.2 °F (36.8 °C) (Oral)   Resp 17   Ht 5' 8\" (1.727 m)   Wt 147 lb 9.6 oz (67 kg)   SpO2 97%   BMI 22.44 kg/m²       General - alert and oriented, no apparent distress  HEENT - NC/AT. No scleral icterus  Pulm - CTAB, normal inspiratory effort  CV - RRR, no M/R/G  Abd - soft, NT, ND. Incisions nicely healed. He has a self reducing small right inguinal hernia. No evidence of left inguinal hernia. Scrotum -no erythema or skin thickening noted. Testicles both normal and nontender.   Mild tenderness of the right epididymis. Ext - warm, well perfused, no edema  Lymphatics - no cervical, supraclavicular or inguinal adenopathy noted. Skin - supple, no rashes  Psychiatric - normal affect, good mood    Labs  None    Imaging  CT Results (most recent):  Results from Hospital Encounter encounter on 09/20/20   CT ABD PELV WO CONT    Narrative INDICATION: Renal colic w/ urinary retention, vomiting, and multiple abd  sugeries. eval for poss obstructive stone v sbo     EXAM: CT Abdomen and Pelvis without IV contrast. No oral contrast.  CT dose reduction was achieved through use of a standardized protocol tailored  for this examination and automatic exposure control for dose modulation. FINDINGS:   No urinary tract stones are seen. There is no hydroureteronephrosis. There is  left perirenal stranding without urinoma. There is a left renal cyst.    There is one mildly dilated loop of mid abdominal small bowel, nonspecific. There is no distinct pattern of bowel obstruction. Status post Whipple procedure; the superior retroperitoneal perivascular tumor  recurrence seen on recent MRI is not well appreciated on this unenhanced CT. Liver shows no apparent significant finding without contrast. There is  pneumobilia. Adrenal glands, spleen and aorta show no significant enlargement. No inflammation is seen. There is no pneumoperitoneum or significant adenopathy. The bladder is unremarkable. The distal ureters are not dilated. There is no  apparent pelvic mass. The appendix is normal.      Impression IMPRESSION:  1. No urinary tract stone or hydronephrosis. 2. Single mildly dilated loop of mid abdominal small bowel, nonspecific. I have reviewed and agree with all of the pertinent images    Assessment:     Deonna Ruff. is a 60 y.o. male known to me for a history of distal cholangiocarcinoma. He is s/p a pylorus preserving whipple procedure on 10/6/17, final pathology [id number].  He completed adjuvant therapy after this. He has had issues with an anastomotic G-J stricture and more recently has had recurrent pancreatitis. He had an ERP with pancreatic stent placement which was repeated on 4/1/20. He has also had stereotactic radiation therapy for localized recurrence of his cholangiocarcinoma along the retroperitoneum. The patient is now status post revision of his duodenojejunostomy to a George-en-Y reconstruction due to ongoing chronic bilious vomiting which was performed on 9/9/2020. He now has a right inguinal hernia. Recommendations:     1. I recommended an open repair of his right inguinal hernia with mesh given his other extensive abdominal surgical history. He has no palpable left inguinal hernia. I discussed risks, benefits and alternatives to surgery and he would like to proceed. I discussed use of mesh to reduce risk of recurrence. We will set him up for this in the near future. 20 mins of time was spent with the patient of which > 50% of the time involved face-to-face counseling of the patient regarding the proposed treatment plan.       Cookie Braden MD  11/17/2020    CC: Froilan Douglas Mins  Dr. Kendra Holly

## 2020-11-18 ENCOUNTER — PATIENT MESSAGE (OUTPATIENT)
Dept: PALLATIVE CARE | Age: 65
End: 2020-11-18

## 2020-11-18 DIAGNOSIS — N23 RENAL COLIC: ICD-10-CM

## 2020-11-18 DIAGNOSIS — C22.1 CHOLANGIOCARCINOMA (HCC): ICD-10-CM

## 2020-11-18 RX ORDER — TRAMADOL HYDROCHLORIDE 50 MG/1
50 TABLET ORAL
Qty: 60 TAB | Refills: 0 | Status: SHIPPED | OUTPATIENT
Start: 2020-11-18 | End: 2020-12-03

## 2020-11-18 RX ORDER — HYDROMORPHONE HYDROCHLORIDE 4 MG/1
2-4 TABLET ORAL
Qty: 90 TAB | Refills: 0 | Status: SHIPPED | OUTPATIENT
Start: 2020-11-18 | End: 2020-12-03

## 2020-11-18 RX ORDER — PANCRELIPASE 24000; 76000; 120000 [USP'U]/1; [USP'U]/1; [USP'U]/1
4 CAPSULE, DELAYED RELEASE PELLETS ORAL
Qty: 360 CAP | Refills: 11 | Status: SHIPPED | OUTPATIENT
Start: 2020-11-18 | End: 2020-12-03

## 2020-11-18 RX ORDER — FENTANYL 37.5 UG/H
37.5 PATCH, EXTENDED RELEASE TRANSDERMAL
Qty: 5 PATCH | Refills: 0 | Status: CANCELLED | OUTPATIENT
Start: 2020-11-18 | End: 2020-12-18

## 2020-11-18 RX ORDER — DUTASTERIDE 0.5 MG/1
0.5 CAPSULE, LIQUID FILLED ORAL DAILY
Qty: 90 CAP | Refills: 3 | Status: SHIPPED | OUTPATIENT
Start: 2020-11-18 | End: 2020-12-21 | Stop reason: SDUPTHER

## 2020-11-18 RX ORDER — OXYCODONE AND ACETAMINOPHEN 5; 325 MG/1; MG/1
1 TABLET ORAL
Qty: 10 TAB | Refills: 0 | Status: CANCELLED | OUTPATIENT
Start: 2020-11-18 | End: 2020-11-21

## 2020-11-18 NOTE — TELEPHONE ENCOUNTER
Triage for Controlled Substance Refill Request    Pain Diagnosis:Severe low back pain, history of L5-S1 spinal fusion in 2000     Last Outpatient Visit: 10/21/20    Next Outpatient Visit: 12/2/20    Reason for refill needed outside of office visit?     -Appointment not scheduled prior to need for scheduled refill    Pharmacy: [facility] [address] [address]     Medication: Tramdol 50 mg  Dose and directions: 1 tab every 6 hours prn  Number dispensed:60  Date filled ( or Pharmacy): 10/31/20  # left: 28    Medication:Dilaudid 4 mg   Dose and directions:0.5-1 tab every 4 hours as needed  Number dispensed: 90  Date filled ( or Pharmacy): 10/15/20  #left: 6     reviewed: yes    Date of Urine Drug Screen:  n/a    Opioid Safety Handout given: yes    Appropriate for refill: yes    Action: please refill

## 2020-11-20 ENCOUNTER — PATIENT OUTREACH (OUTPATIENT)
Dept: OTHER | Age: 65
End: 2020-11-20

## 2020-11-20 NOTE — PROGRESS NOTES
CM  follow up call. Patient with cholangiocarcinoma s/p pylorus-preserving Whipple 10/2017; G_J George-EN Y revision 2020, HTN, DM, BPH and chronic LBP    Chart review:  FU with PCP/ Palliative Care ; Dr. Alex Meraz- for surgery-  left inguinal hernia. Contacted  patient for CM follow up services. Verified  and address for HIPAA security. * Pain is managed- \"took a week to get the medicine right. \"   - Pending apt with Dr. Wilfrid Camarillo for evaluation of back pain. He is reviewing records before apt is made. Expecting call back 'soon. '   CM offers to call for apt if needed.    - Mr. Emely Shaikh identifies one specific spot on his back- no radiating pain- No imaging of this area. * Surgery for hernia repair second week in Dec.  No specific date yet. * No N/V since surgery! - Awaiting insurance approval for PET scan for comparison and tracking cholangiocarcinoma.     - Dr. Damon Contreras appealing insurance denial.    * Crops should be in by next week. - This will allow Mr. Cristina to recuperate from any surgery over the winter without working. * CM asks about DM/ HTN   - Patient reports that his blood sugar and blood pressure are, \"Right where they should be. \"     - A1C 5.5 10/15    * MED CHANGES: Dilaudid for back pain- per Palliative Care. * MD APTS:   Pending hernia repair surgery/ awaitng apt with Dr. Wilfrid Camarillo. * Patient Concerns:    * CM Concerns:  Potential need for multiple surgical interventions in Dec- back vs hernia? Will follow for CM services. Next planned outreach:  - hernia surgery plans? FU with LBP - apt with Dr. Wilfrid Camarillo. PET scan? Chart Review:  Surgical visit   Assessment:      Otilia Rodriguez. is a 60 y.o. male known to me for a history of distal cholangiocarcinoma. He is s/p a pylorus preserving whipple procedure on 10/6/17, final pathology [id number]. He completed adjuvant therapy after this.   He has had issues with an anastomotic G-J stricture and more recently has had recurrent pancreatitis. He had an ERP with pancreatic stent placement which was repeated on 4/1/20. He has also had stereotactic radiation therapy for localized recurrence of his cholangiocarcinoma along the retroperitoneum. The patient is now status post revision of his duodenojejunostomy to a George-en-Y reconstruction due to ongoing chronic bilious vomiting which was performed on 9/9/2020.       He now has a right inguinal hernia.     Recommendations:      1.   I recommended an open repair of his right inguinal hernia with mesh given his other extensive abdominal surgical history. He has no palpable left inguinal hernia. I discussed risks, benefits and alternatives to surgery and he would like to proceed. I discussed use of mesh to reduce risk of recurrence. We will set him up for this in the near future.

## 2020-11-30 ENCOUNTER — TELEPHONE (OUTPATIENT)
Dept: PALLATIVE CARE | Age: 65
End: 2020-11-30

## 2020-11-30 NOTE — TELEPHONE ENCOUNTER
Called patient back and confirmed his virtual visit for 12/2/2020 with a nurse calling between [address] and 1:30pm.  Pt confirmed.

## 2020-12-03 ENCOUNTER — HOSPITAL ENCOUNTER (OUTPATIENT)
Dept: PREADMISSION TESTING | Age: 65
Discharge: HOME | End: 2020-12-03
Payer: COMMERCIAL

## 2020-12-03 ENCOUNTER — HOSPITAL ENCOUNTER (OUTPATIENT)
Dept: GENERAL RADIOLOGY | Age: 65
Discharge: HOME | End: 2020-12-03
Attending: SURGERY
Payer: COMMERCIAL

## 2020-12-03 VITALS
BODY MASS INDEX: 21.95 KG/M2 | WEIGHT: 144.84 LBS | TEMPERATURE: 97.9 F | SYSTOLIC BLOOD PRESSURE: 114 MMHG | HEART RATE: 71 BPM | DIASTOLIC BLOOD PRESSURE: 69 MMHG | HEIGHT: 68 IN | RESPIRATION RATE: 16 BRPM

## 2020-12-03 LAB
ATRIAL RATE: 75 BPM
BASOPHILS # BLD: 0.1 K/UL (ref 0–0.1)
BASOPHILS NFR BLD: 1 % (ref 0–1)
CALCULATED P AXIS, ECG09: 104 DEGREES
CALCULATED R AXIS, ECG10: 29 DEGREES
CALCULATED T AXIS, ECG11: 36 DEGREES
DIAGNOSIS, 93000: NORMAL
DIFFERENTIAL METHOD BLD: ABNORMAL
EOSINOPHIL # BLD: 0.3 K/UL (ref 0–0.4)
EOSINOPHIL NFR BLD: 6 % (ref 0–7)
ERYTHROCYTE [DISTWIDTH] IN BLOOD BY AUTOMATED COUNT: 13.5 % (ref 11.5–14.5)
HCT VFR BLD AUTO: 39.1 % (ref 36.6–50.3)
HGB BLD-MCNC: 12.7 G/DL (ref 12.1–17)
IMM GRANULOCYTES # BLD AUTO: 0.1 K/UL (ref 0–0.04)
IMM GRANULOCYTES NFR BLD AUTO: 1 % (ref 0–0.5)
LYMPHOCYTES # BLD: 0.7 K/UL (ref 0.8–3.5)
LYMPHOCYTES NFR BLD: 13 % (ref 12–49)
MCH RBC QN AUTO: 31.1 PG (ref 26–34)
MCHC RBC AUTO-ENTMCNC: 32.5 G/DL (ref 30–36.5)
MCV RBC AUTO: 95.6 FL (ref 80–99)
MONOCYTES # BLD: 0.5 K/UL (ref 0–1)
MONOCYTES NFR BLD: 9 % (ref 5–13)
NEUTS SEG # BLD: 3.7 K/UL (ref 1.8–8)
NEUTS SEG NFR BLD: 70 % (ref 32–75)
NRBC # BLD: 0 K/UL (ref 0–0.01)
NRBC BLD-RTO: 0 PER 100 WBC
P-R INTERVAL, ECG05: 148 MS
PLATELET # BLD AUTO: 135 K/UL (ref 150–400)
Q-T INTERVAL, ECG07: 418 MS
QRS DURATION, ECG06: 96 MS
QTC CALCULATION (BEZET), ECG08: 466 MS
RBC # BLD AUTO: 4.09 M/UL (ref 4.1–5.7)
RBC MORPH BLD: ABNORMAL
VENTRICULAR RATE, ECG03: 75 BPM
WBC # BLD AUTO: 5.4 K/UL (ref 4.1–11.1)

## 2020-12-03 PROCEDURE — 36415 COLL VENOUS BLD VENIPUNCTURE: CPT

## 2020-12-03 PROCEDURE — 93005 ELECTROCARDIOGRAM TRACING: CPT

## 2020-12-03 PROCEDURE — 85025 COMPLETE CBC W/AUTO DIFF WBC: CPT

## 2020-12-03 PROCEDURE — 71046 X-RAY EXAM CHEST 2 VIEWS: CPT

## 2020-12-03 RX ORDER — ALUMINA, MAGNESIA, AND SIMETHICONE 2400; 2400; 240 MG/30ML; MG/30ML; MG/30ML
10 SUSPENSION ORAL
COMMUNITY

## 2020-12-03 NOTE — PERIOP NOTES
[facility] APPOINTMENTS REVIEWED AND GIVEN TO PATIENT, INCLUDING PHONE NUMBER TO REGISTRATION. COVID-19 INSTRUCTION SHEET ALSO REVIEWED AND GIVEN TO PATIENT. DO NOT EAT ANYTHING AFTER MIDNIGHT, except as instructed THE NIGHT BEFORE SURGERY. PT GIVEN OPPORTUNITY TO ASK ADDITIONAL QUESTIONS. Patient given two bottles CHG soap and instruction sheet, instructions for use reviewed with patient. Patient given surgical site infection information FAQs handout and hand hygiene tips sheet. Pre-operative instructions reviewed and patient verbalizes understanding of instructions. Patient has been given the opportunity to ask additional questions. [facility] APPOINTMENTS REVIEWED AND GIVEN TO PATIENT  COVID-19 INSTRUCTION SHEET ALSO REVIEWED AND GIVEN TO PATIENT.

## 2020-12-07 ENCOUNTER — TRANSCRIBE ORDER (OUTPATIENT)
Dept: REGISTRATION | Age: 65
End: 2020-12-07

## 2020-12-07 ENCOUNTER — HOSPITAL ENCOUNTER (OUTPATIENT)
Dept: PREADMISSION TESTING | Age: 65
Discharge: HOME | End: 2020-12-07
Payer: COMMERCIAL

## 2020-12-07 DIAGNOSIS — Z01.812 PRE-PROCEDURE LAB EXAM: Primary | ICD-10-CM

## 2020-12-07 DIAGNOSIS — Z01.812 PRE-PROCEDURE LAB EXAM: ICD-10-CM

## 2020-12-07 PROCEDURE — 87635 SARS-COV-2 COVID-19 AMP PRB: CPT

## 2020-12-08 ENCOUNTER — PATIENT OUTREACH (OUTPATIENT)
Dept: OTHER | Age: 65
End: 2020-12-08

## 2020-12-08 LAB — SARS-COV-2, COV2NT: NOT DETECTED

## 2020-12-08 NOTE — PROGRESS NOTES
CM  follow up call. Patient with cholangiocarcinoma s/p pylorus-preserving Whipple 10/2017; G_J George-EN Y revision 2020, HTN, DM, BPH and chronic LBP    Chart review:  Hernia repair for  - Dr. Louie Arthur  patient for CM follow up services. Verified  and address for HIPAA security. * Mr. Ana Shahid reports that he is busy at this times and will call me back. No call back from patient. Will follow for CM services. Next planned outreach:  2 days-  FU re: surgery?

## 2020-12-10 ENCOUNTER — ANESTHESIA EVENT (OUTPATIENT)
Dept: SURGERY | Age: 65
End: 2020-12-10
Payer: COMMERCIAL

## 2020-12-10 ENCOUNTER — PATIENT OUTREACH (OUTPATIENT)
Dept: OTHER | Age: 65
End: 2020-12-10

## 2020-12-10 NOTE — PROGRESS NOTES
CM  follow up call. Patient with cholangiocarcinoma s/p pylorus-preserving Whipple 10/2017; G_J George-EN Y revision 2020, HTN, DM, BPH and chronic LBP    Chart review: For surgery - OP hernia repair tomorrow. Contacted  patient for CM follow up services. Verified  and address for HIPAA security. * Mr. Inocencia Santiago confirms surgery tomorrow. Pre-Op guidance reviewed:    His wife is a RN, and he has had many surgeries- but refresher:       - separate washcloths for surgical area and rest of body/ change frequently. * Avoid spreading of bacteria/ infecting incisions. - change bed sheets pre-op and every other day when home until suture lines are closed. More frequently if sweating or wound discharge is present. - Avoid pets in the bed until suture lines closed. * Avoid infection/ inflammation.      - Hydrate well pre-op - and start Colace/ Mirralax to prevent post op constipation. Avoid heavy meal pre-op (stay with easily digested foods/ high in fiber). IV hydration helps your muscles but not your gut. Will follow for CM services. Next planned outreach:  Post op call - monitor for DC tomorrow/ or FU Monday. * Pain meds with Palliative have been helpful. - Pain at that spot in his back as well as herniated site. - PET scan planned for  to r/o CA before referring for epidural steroid shot. - Patch not working well due to lessened fat to facilitate. * Not loosing weight, but not gaining.     - Drinking ensure. - CM shares that Zaki Harbour is better for diabetics. - CM will send more coupons. * CM will be off tomorrow - will call to FU Monday. FU call post op -  call .

## 2020-12-11 ENCOUNTER — TELEPHONE (OUTPATIENT)
Dept: SURGERY | Age: 65
End: 2020-12-11

## 2020-12-11 ENCOUNTER — PATIENT MESSAGE (OUTPATIENT)
Dept: PALLATIVE CARE | Age: 65
End: 2020-12-11

## 2020-12-11 ENCOUNTER — ANESTHESIA (OUTPATIENT)
Dept: SURGERY | Age: 65
End: 2020-12-11
Payer: COMMERCIAL

## 2020-12-11 ENCOUNTER — HOSPITAL ENCOUNTER (OUTPATIENT)
Age: 65
Setting detail: OUTPATIENT SURGERY
Discharge: HOME | End: 2020-12-11
Attending: SURGERY | Admitting: SURGERY
Payer: COMMERCIAL

## 2020-12-11 VITALS
RESPIRATION RATE: 19 BRPM | HEART RATE: 78 BPM | SYSTOLIC BLOOD PRESSURE: 120 MMHG | WEIGHT: 144 LBS | DIASTOLIC BLOOD PRESSURE: 67 MMHG | TEMPERATURE: 98.2 F | OXYGEN SATURATION: 96 % | HEIGHT: 68 IN | BODY MASS INDEX: 21.82 KG/M2

## 2020-12-11 DIAGNOSIS — C22.1 CHOLANGIOCARCINOMA (HCC): ICD-10-CM

## 2020-12-11 DIAGNOSIS — K40.90 RIGHT INGUINAL HERNIA: ICD-10-CM

## 2020-12-11 LAB
GLUCOSE BLD STRIP.AUTO-MCNC: 101 MG/DL (ref 65–100)
GLUCOSE BLD STRIP.AUTO-MCNC: 115 MG/DL (ref 65–100)
SERVICE CMNT-IMP: ABNORMAL
SERVICE CMNT-IMP: ABNORMAL

## 2020-12-11 PROCEDURE — 77030002933 HC SUT MCRYL J&J -A: Performed by: SURGERY

## 2020-12-11 PROCEDURE — 76210000020 HC REC RM PH II FIRST 0.5 HR: Performed by: SURGERY

## 2020-12-11 PROCEDURE — 77030042556 HC PNCL CAUT -B: Performed by: SURGERY

## 2020-12-11 PROCEDURE — 49505 PRP I/HERN INIT REDUC >5 YR: CPT | Performed by: SURGERY

## 2020-12-11 PROCEDURE — 77030010507 HC ADH SKN DERMBND J&J -B: Performed by: SURGERY

## 2020-12-11 PROCEDURE — 74011250636 HC RX REV CODE- 250/636: Performed by: SURGERY

## 2020-12-11 PROCEDURE — 82962 GLUCOSE BLOOD TEST: CPT

## 2020-12-11 PROCEDURE — 74011250636 HC RX REV CODE- 250/636: Performed by: ANESTHESIOLOGY

## 2020-12-11 PROCEDURE — C1781 MESH (IMPLANTABLE): HCPCS | Performed by: SURGERY

## 2020-12-11 PROCEDURE — 76210000006 HC OR PH I REC 0.5 TO 1 HR: Performed by: SURGERY

## 2020-12-11 PROCEDURE — 74011250637 HC RX REV CODE- 250/637: Performed by: ANESTHESIOLOGY

## 2020-12-11 PROCEDURE — 76060000034 HC ANESTHESIA 1.5 TO 2 HR: Performed by: SURGERY

## 2020-12-11 PROCEDURE — 77030002966 HC SUT PDS J&J -A: Performed by: SURGERY

## 2020-12-11 PROCEDURE — 77030011267 HC ELECTRD BLD COVD -A: Performed by: SURGERY

## 2020-12-11 PROCEDURE — 76010000149 HC OR TIME 1 TO 1.5 HR: Performed by: SURGERY

## 2020-12-11 PROCEDURE — 77030040361 HC SLV COMPR DVT MDII -B: Performed by: SURGERY

## 2020-12-11 PROCEDURE — 74011000250 HC RX REV CODE- 250: Performed by: NURSE ANESTHETIST, CERTIFIED REGISTERED

## 2020-12-11 PROCEDURE — 2709999900 HC NON-CHARGEABLE SUPPLY: Performed by: SURGERY

## 2020-12-11 PROCEDURE — 74011000250 HC RX REV CODE- 250: Performed by: SURGERY

## 2020-12-11 PROCEDURE — 77030031139 HC SUT VCRL2 J&J -A: Performed by: SURGERY

## 2020-12-11 PROCEDURE — 77030040503 HC DRN WND PENRS MDII -A: Performed by: SURGERY

## 2020-12-11 PROCEDURE — 77030008684 HC TU ET CUF COVD -B: Performed by: ANESTHESIOLOGY

## 2020-12-11 PROCEDURE — 74011250636 HC RX REV CODE- 250/636: Performed by: NURSE ANESTHETIST, CERTIFIED REGISTERED

## 2020-12-11 PROCEDURE — 77030026438 HC STYL ET INTUB CARD -A: Performed by: ANESTHESIOLOGY

## 2020-12-11 DEVICE — IMPLANTABLE DEVICE: Type: IMPLANTABLE DEVICE | Site: GROIN | Status: FUNCTIONAL

## 2020-12-11 RX ORDER — SODIUM CHLORIDE 0.9 % (FLUSH) 0.9 %
5-40 SYRINGE (ML) INJECTION EVERY 8 HOURS
Status: DISCONTINUED | OUTPATIENT
Start: 2020-12-11 | End: 2020-12-11 | Stop reason: HOSPADM

## 2020-12-11 RX ORDER — HYDROMORPHONE HYDROCHLORIDE 4 MG/1
2 TABLET ORAL
Qty: 20 TAB | Refills: 0 | Status: SHIPPED | OUTPATIENT
Start: 2020-12-11 | End: 2020-12-18

## 2020-12-11 RX ORDER — HYDROMORPHONE HYDROCHLORIDE 2 MG/1
2 TABLET ORAL ONCE
Status: COMPLETED | OUTPATIENT
Start: 2020-12-11 | End: 2020-12-11

## 2020-12-11 RX ORDER — BUPIVACAINE HYDROCHLORIDE AND EPINEPHRINE 2.5; 5 MG/ML; UG/ML
INJECTION, SOLUTION EPIDURAL; INFILTRATION; INTRACAUDAL; PERINEURAL AS NEEDED
Status: DISCONTINUED | OUTPATIENT
Start: 2020-12-11 | End: 2020-12-11 | Stop reason: HOSPADM

## 2020-12-11 RX ORDER — MORPHINE SULFATE 10 MG/ML
2 INJECTION, SOLUTION INTRAMUSCULAR; INTRAVENOUS
Status: DISCONTINUED | OUTPATIENT
Start: 2020-12-11 | End: 2020-12-11 | Stop reason: HOSPADM

## 2020-12-11 RX ORDER — SODIUM CHLORIDE 0.9 % (FLUSH) 0.9 %
5-40 SYRINGE (ML) INJECTION AS NEEDED
Status: DISCONTINUED | OUTPATIENT
Start: 2020-12-11 | End: 2020-12-11 | Stop reason: HOSPADM

## 2020-12-11 RX ORDER — NEOSTIGMINE METHYLSULFATE 1 MG/ML
INJECTION INTRAVENOUS AS NEEDED
Status: DISCONTINUED | OUTPATIENT
Start: 2020-12-11 | End: 2020-12-11 | Stop reason: HOSPADM

## 2020-12-11 RX ORDER — SUCCINYLCHOLINE CHLORIDE 20 MG/ML
INJECTION INTRAMUSCULAR; INTRAVENOUS AS NEEDED
Status: DISCONTINUED | OUTPATIENT
Start: 2020-12-11 | End: 2020-12-11 | Stop reason: HOSPADM

## 2020-12-11 RX ORDER — LIDOCAINE HYDROCHLORIDE 20 MG/ML
INJECTION, SOLUTION EPIDURAL; INFILTRATION; INTRACAUDAL; PERINEURAL AS NEEDED
Status: DISCONTINUED | OUTPATIENT
Start: 2020-12-11 | End: 2020-12-11 | Stop reason: HOSPADM

## 2020-12-11 RX ORDER — FENTANYL CITRATE 50 UG/ML
25 INJECTION, SOLUTION INTRAMUSCULAR; INTRAVENOUS
Status: DISCONTINUED | OUTPATIENT
Start: 2020-12-11 | End: 2020-12-11 | Stop reason: HOSPADM

## 2020-12-11 RX ORDER — GLYCOPYRROLATE 0.2 MG/ML
INJECTION INTRAMUSCULAR; INTRAVENOUS AS NEEDED
Status: DISCONTINUED | OUTPATIENT
Start: 2020-12-11 | End: 2020-12-11 | Stop reason: HOSPADM

## 2020-12-11 RX ORDER — DEXAMETHASONE SODIUM PHOSPHATE 4 MG/ML
INJECTION, SOLUTION INTRA-ARTICULAR; INTRALESIONAL; INTRAMUSCULAR; INTRAVENOUS; SOFT TISSUE AS NEEDED
Status: DISCONTINUED | OUTPATIENT
Start: 2020-12-11 | End: 2020-12-11 | Stop reason: HOSPADM

## 2020-12-11 RX ORDER — OXYCODONE HCL 20 MG/1
20 TABLET, FILM COATED, EXTENDED RELEASE ORAL EVERY 12 HOURS
Qty: 30 TAB | Refills: 0 | Status: SHIPPED | OUTPATIENT
Start: 2020-12-14 | End: 2020-12-21 | Stop reason: SDUPTHER

## 2020-12-11 RX ORDER — OXYCODONE HCL 20 MG/1
20 TABLET, FILM COATED, EXTENDED RELEASE ORAL EVERY 12 HOURS
Qty: 30 TAB | Refills: 0 | Status: SHIPPED | OUTPATIENT
Start: 2020-12-14 | End: 2020-12-11 | Stop reason: SDUPTHER

## 2020-12-11 RX ORDER — LIDOCAINE HYDROCHLORIDE 10 MG/ML
0.1 INJECTION, SOLUTION EPIDURAL; INFILTRATION; INTRACAUDAL; PERINEURAL AS NEEDED
Status: DISCONTINUED | OUTPATIENT
Start: 2020-12-11 | End: 2020-12-11 | Stop reason: HOSPADM

## 2020-12-11 RX ORDER — ACETAMINOPHEN 500 MG
1000 TABLET ORAL ONCE
Status: COMPLETED | OUTPATIENT
Start: 2020-12-11 | End: 2020-12-11

## 2020-12-11 RX ORDER — SODIUM CHLORIDE, SODIUM LACTATE, POTASSIUM CHLORIDE, CALCIUM CHLORIDE 600; 310; 30; 20 MG/100ML; MG/100ML; MG/100ML; MG/100ML
50 INJECTION, SOLUTION INTRAVENOUS CONTINUOUS
Status: DISCONTINUED | OUTPATIENT
Start: 2020-12-11 | End: 2020-12-11 | Stop reason: HOSPADM

## 2020-12-11 RX ORDER — HYDROMORPHONE HYDROCHLORIDE 1 MG/ML
0.2 INJECTION, SOLUTION INTRAMUSCULAR; INTRAVENOUS; SUBCUTANEOUS
Status: DISCONTINUED | OUTPATIENT
Start: 2020-12-11 | End: 2020-12-11 | Stop reason: HOSPADM

## 2020-12-11 RX ORDER — OXYCODONE HYDROCHLORIDE 5 MG/1
5 TABLET ORAL
Status: DISCONTINUED | OUTPATIENT
Start: 2020-12-11 | End: 2020-12-11 | Stop reason: HOSPADM

## 2020-12-11 RX ORDER — ONDANSETRON 2 MG/ML
INJECTION INTRAMUSCULAR; INTRAVENOUS AS NEEDED
Status: DISCONTINUED | OUTPATIENT
Start: 2020-12-11 | End: 2020-12-11 | Stop reason: HOSPADM

## 2020-12-11 RX ORDER — IBUPROFEN 400 MG/1
800 TABLET ORAL
Status: DISCONTINUED | OUTPATIENT
Start: 2020-12-11 | End: 2020-12-11 | Stop reason: HOSPADM

## 2020-12-11 RX ORDER — NALOXONE HYDROCHLORIDE 4 MG/.1ML
SPRAY NASAL
Qty: 1 EACH | Refills: 0 | Status: SHIPPED | OUTPATIENT
Start: 2020-12-11 | End: 2022-01-01 | Stop reason: ALTCHOICE

## 2020-12-11 RX ORDER — ROCURONIUM BROMIDE 10 MG/ML
INJECTION, SOLUTION INTRAVENOUS AS NEEDED
Status: DISCONTINUED | OUTPATIENT
Start: 2020-12-11 | End: 2020-12-11 | Stop reason: HOSPADM

## 2020-12-11 RX ORDER — PROPOFOL 10 MG/ML
INJECTION, EMULSION INTRAVENOUS AS NEEDED
Status: DISCONTINUED | OUTPATIENT
Start: 2020-12-11 | End: 2020-12-11 | Stop reason: HOSPADM

## 2020-12-11 RX ORDER — FENTANYL CITRATE 50 UG/ML
INJECTION, SOLUTION INTRAMUSCULAR; INTRAVENOUS AS NEEDED
Status: DISCONTINUED | OUTPATIENT
Start: 2020-12-11 | End: 2020-12-11 | Stop reason: HOSPADM

## 2020-12-11 RX ORDER — ONDANSETRON 2 MG/ML
4 INJECTION INTRAMUSCULAR; INTRAVENOUS AS NEEDED
Status: DISCONTINUED | OUTPATIENT
Start: 2020-12-11 | End: 2020-12-11 | Stop reason: HOSPADM

## 2020-12-11 RX ADMIN — ROCURONIUM BROMIDE 5 MG: 10 SOLUTION INTRAVENOUS at 11:49

## 2020-12-11 RX ADMIN — FENTANYL CITRATE 25 MCG: 50 INJECTION, SOLUTION INTRAMUSCULAR; INTRAVENOUS at 13:33

## 2020-12-11 RX ADMIN — SUCCINYLCHOLINE CHLORIDE 140 MG: 20 INJECTION, SOLUTION INTRAMUSCULAR; INTRAVENOUS at 11:50

## 2020-12-11 RX ADMIN — VANCOMYCIN HYDROCHLORIDE 1000 MG: 1 INJECTION, POWDER, LYOPHILIZED, FOR SOLUTION INTRAVENOUS at 11:23

## 2020-12-11 RX ADMIN — HYDROMORPHONE HYDROCHLORIDE 2 MG: 2 TABLET ORAL at 14:17

## 2020-12-11 RX ADMIN — FENTANYL CITRATE 50 MCG: 50 INJECTION, SOLUTION INTRAMUSCULAR; INTRAVENOUS at 11:36

## 2020-12-11 RX ADMIN — SODIUM CHLORIDE, SODIUM LACTATE, POTASSIUM CHLORIDE, AND CALCIUM CHLORIDE 50 ML/HR: 600; 310; 30; 20 INJECTION, SOLUTION INTRAVENOUS at 11:22

## 2020-12-11 RX ADMIN — ONDANSETRON HYDROCHLORIDE 4 MG: 2 INJECTION, SOLUTION INTRAMUSCULAR; INTRAVENOUS at 12:54

## 2020-12-11 RX ADMIN — FENTANYL CITRATE 50 MCG: 50 INJECTION, SOLUTION INTRAMUSCULAR; INTRAVENOUS at 11:49

## 2020-12-11 RX ADMIN — FENTANYL CITRATE 25 MCG: 50 INJECTION, SOLUTION INTRAMUSCULAR; INTRAVENOUS at 13:45

## 2020-12-11 RX ADMIN — LIDOCAINE HYDROCHLORIDE 50 MG: 20 INJECTION, SOLUTION EPIDURAL; INFILTRATION; INTRACAUDAL; PERINEURAL at 11:49

## 2020-12-11 RX ADMIN — PROPOFOL 130 MG: 10 INJECTION, EMULSION INTRAVENOUS at 11:49

## 2020-12-11 RX ADMIN — GLYCOPYRROLATE 0.4 MG: 0.2 INJECTION, SOLUTION INTRAMUSCULAR; INTRAVENOUS at 12:55

## 2020-12-11 RX ADMIN — ACETAMINOPHEN 1000 MG: 500 TABLET ORAL at 11:29

## 2020-12-11 RX ADMIN — DEXAMETHASONE SODIUM PHOSPHATE 4 MG: 4 INJECTION, SOLUTION INTRAMUSCULAR; INTRAVENOUS at 11:58

## 2020-12-11 RX ADMIN — ROCURONIUM BROMIDE 15 MG: 10 SOLUTION INTRAVENOUS at 11:55

## 2020-12-11 RX ADMIN — NEOSTIGMINE METHYLSULFATE 3 MG: 1 INJECTION, SOLUTION INTRAVENOUS at 12:55

## 2020-12-11 NOTE — OP NOTES
[address] [facility]  OPERATIVE REPORT    Name:  Sandip Krishna  MR#:  [id number]  :  1955  ACCOUNT #:  [de-identified]  DATE OF SERVICE:  2020      PREOPERATIVE DIAGNOSIS:  Right inguinal hernia. POSTOPERATIVE DIAGNOSIS:  Right inguinal hernia. PROCEDURE PERFORMED:  Open right inguinal hernia repair with mesh. SURGEON:  Monty Fall. Darwin Carcamo MD    ASSISTANT:  Kirti Hamilton SA    ANESTHESIA:  General.    COMPLICATIONS:  None. SPECIMENS REMOVED:  None. IMPLANTS:  ProGrip anatomic right inguinal hernia mesh. ESTIMATED BLOOD LOSS:  5 mL. FINDINGS:  The patient had a small indirect right inguinal hernia. There was no direct component identified. INDICATIONS:  The patient is a 60-year-old male known to me for a history of a cholangiocarcinoma. He more recently has presented with discomfort and a bulge in the right inguinal region and on exam, this was consistent with a reducible right inguinal hernia. Recommendation was made for surgical repair as the patient was symptomatic. A complete discussion of risks, benefits, and alternatives of surgery were had with the patient and he was in agreement to proceed. Informed consent was obtained. DESCRIPTION OF THE OPERATION:  After informed consent was obtained, the patient was brought back to the operating room. He was placed under general endotracheal anesthesia in the supine position on the operating room table. The right side had been preoperatively marked and this was confirmed at this time. He was prepped and draped in usual sterile fashion and a proper time-out was performed. With this completed, we injected local anesthetic into the skin and subcutaneous tissue overlying the right inguinal region. An oblique incision was made between the anterior-superior iliac spine and the pubic tubercle and this was carried down through the subcutaneous tissue and to the external oblique fascia using electrocautery.   External oblique fascia was then opened sharply with Metzenbaum scissors along the axis of the muscle fibers overlying the inguinal canal.  The external inguinal ring was opened and the contents of the inguinal canal were identified. We dissected around these using a Kittner dissector to separate the external oblique fascia away. The ilioinguinal nerve was identified and preserved. The contents of the canal were encircled using a Penrose drain. We then  the cremasteric fibers and identified the hernia sac. This was associated with a small cord lipoma and was small in size. We dissected this back to its base at the internal inguinal ring and then a high ligation of the sac was performed by clamping this with a right angle excising the sac and suture ligating it with a 2-0 Vicryl suture. The vas deferens and testicular vessels were all clearly identified and preserved. There were no other contents noted within the inguinal canal.  The direct space appeared to be nicely intact. We then placed a ProGrip anatomic right inguinal hernia mesh into position for a Chadwick type repair. This created a new internal inguinal ring and was pressed into the tissues for fixation. A 2-0 PDS suture was used to fixate this to the area of Wayne's ligament as well as to secure the mesh to itself around the internal inguinal ring. The internal ring was tested and noted to be loose enough to avoid any edematous ischemia to the testicular vessels. We then closed the external oblique fascia using a running 2-0 PDS suture. Additional local anesthetic was injected along the fascia and the subcutaneous tissue. We then irrigated and then closed the level of Ramon's fascia using a running 2-0 Vicryl suture. The deep dermis and subcutaneous tissue were closed using 3-0 Vicryl interrupted sutures and the skin was closed using a 4-0 Monocryl subcuticular stitch. The incision was covered with Dermabond skin adhesive.   The patient was then awakened, extubated, and taken to the postanesthesia care unit in stable condition. All needle and instrument counts were correct at the completion of the case. I was present and scrubbed throughout the entirety of the case. There were no immediate complications. DISPOSITION:  PACU.         Gunner Luo MD      [id number]  D:  12/11/2020 13:12  T:  12/11/2020 16:31  JOB #:  [id number]

## 2020-12-11 NOTE — ROUTINE PROCESS
PATIENT'S FAMILY MEMBER (JOSE SILVERMAN) CALLED AT [phone] AND INFORMED OF PATIENT'S PROGRESS.

## 2020-12-11 NOTE — ROUTINE PROCESS
Patient: Angel Jimenez MRN: [id number]  SSN: [id number]   YOB: 1955  Age: 60 y.o. Sex: male     Patient is status post Procedure(s):  OPEN REPAIR RIGHT INGUINAL HERNIA WITH MESH. Surgeon(s) and Role:     * Scarlet Carbajal MD - Primary    Local/Dose/Irrigation: See EMR                Peripheral IV 12/11/20 Left Forearm (Active)   Site Assessment Clean, dry, & intact 12/11/20 1048   Phlebitis Assessment 0 12/11/20 1048   Infiltration Assessment 0 12/11/20 1048   Dressing Status New 12/11/20 1048   Dressing Type Tape;Transparent 12/11/20 1048   Hub Color/Line Status Green; Infusing 12/11/20 1048            Airway - Endotracheal Tube 12/11/20 Oral (Active)                   Dressing/Packing:  Wound Abdomen Right-Dressing/Treatment: Other (Comment)(Topical skin adhesive (dermabond)) (12/11/20 1247)    Splint/Cast:     Other:

## 2020-12-11 NOTE — ANESTHESIA POSTPROCEDURE EVALUATION
Post-Anesthesia Evaluation and Assessment    Patient: Teri Workman MRN: [id number]  SSN: [id number]    YOB: 1955  Age: 60 y.o. Sex: male      I have evaluated the patient and they are stable and ready for discharge from the PACU. Cardiovascular Function/Vital Signs  Visit Vitals  /67   Pulse 78   Temp 36.8 °C (98.2 °F)   Resp 19   Ht 5' 8\" (1.727 m)   Wt 65.3 kg (144 lb)   SpO2 96%   BMI 21.90 kg/m²       Patient is status post General anesthesia for Procedure(s):  OPEN REPAIR RIGHT INGUINAL HERNIA WITH MESH. Nausea/Vomiting: None    Postoperative hydration reviewed and adequate. Pain:  Pain Scale 1: Numeric (0 - 10) (12/11/20 1400)  Pain Intensity 1: 1 (12/11/20 1400)   Managed    Neurological Status:   Neuro (WDL): Within Defined Limits (12/11/20 1345)  Neuro  Neurologic State: Alert (12/11/20 1345)  LUE Motor Response: Purposeful (12/11/20 1345)  LLE Motor Response: Purposeful (12/11/20 1345)  RUE Motor Response: Purposeful (12/11/20 1345)  RLE Motor Response: Purposeful (12/11/20 1345)   At baseline    Mental Status, Level of Consciousness: Alert and  oriented to person, place, and time    Pulmonary Status:   O2 Device: Room air (12/11/20 1400)   Adequate oxygenation and airway patent    Complications related to anesthesia: None    Post-anesthesia assessment completed. No concerns    Signed By: Yony Dunbar MD     December 11, 2020              Procedure(s):  OPEN REPAIR RIGHT INGUINAL HERNIA WITH MESH. general    <BSHSIANPOST>    INITIAL Post-op Vital signs:   Vitals Value Taken Time   /67 12/11/2020  2:00 PM   Temp 36.8 °C (98.2 °F) 12/11/2020  1:45 PM   Pulse 77 12/11/2020  2:00 PM   Resp 19 12/11/2020  2:00 PM   SpO2 97 % 12/11/2020  2:00 PM   Vitals shown include unvalidated device data.

## 2020-12-11 NOTE — TELEPHONE ENCOUNTER
Inga from [facility] calling to discuss patient's prescription for hydromorphone. Ivette Antony stated that he was prescribed this prescription from another doctor's office as well.

## 2020-12-11 NOTE — BRIEF OP NOTE
Brief Postoperative Note    Patient: Milagros Curran. YOB: 1955  MRN: [id number]    Date of Procedure: 12/11/2020     Pre-Op Diagnosis: RIGHT INGUINAL HERNIA    Post-Op Diagnosis: Same as preoperative diagnosis. Procedure(s):  OPEN REPAIR RIGHT INGUINAL HERNIA WITH MESH    Surgeon(s): Reny Armas MD    Surgical Assistant: Surg Asst-1: Angela Estrada    Anesthesia: General     Estimated Blood Loss (mL): 5 mL    Complications: None    Specimens: * No specimens in log *     Implants:   Implant Name Type Inv.  Item Serial No.  Lot No. LRB No. Used Action   ProGrip Self-Gripping Polyester Mesh   N/A COVIDIEN [id number] Right 1 Implanted       Drains: * No LDAs found *    Findings: Small indirect right inguinal hernia    Electronically Signed by Brii Pacheco MD on 12/11/2020 at 1:01 PM

## 2020-12-11 NOTE — DISCHARGE INSTRUCTIONS
Inguinal Hernia Repair      Patient Discharge Instructions    Zuleyka Bejarano / [id number] : 1955    Admitted 2020 Discharged: 2020     · It is important that you take the medication exactly as they are prescribed. · Keep your medication in the bottles provided by the pharmacist and keep a list of the medication names, dosages, and times to be taken in your wallet. · Do not take other medications without consulting your doctor. · Wound care: You may shower starting tomorrow. Do not remove the dermabond on the incision, it will fall of on its own in a few weeks. · No heavy lifting or strenuous activity for 4-6 weeks after the operation    Take ibuprofen (Motrin) as scheduled then combine with hydromorphone (Dilaudid) as needed for severe pain. What to do at Home    See instructions below. Follow-up with Dr. Hetal Bob in 2 week(s). Call the office [phone]) to schedule your appointment. Took Tylenol at 11:29 am , next dose if needed for pain at 5:30 pm  Took Dilaudid 2 mg at 2:17 pm    Information obtained by :  I understand that if any problems occur once I am at home I am to contact my physician. I understand and acknowledge receipt of the instructions indicated above. [de-identified] or R.N.'s Signature                                                                  Date/Time                                                                                                                                              Patient or Representative Signature                                                          Date/Time     Inguinal Hernia Repair: What to Expect at [address] are likely to have pain for the next few days. You may also feel like you have the flu, and you may have a low fever and feel tired and nauseated.  This is common. You should feel better after a few days and will probably feel much better in 7 days. For several weeks you may feel twinges or pulling in the hernia repair when you move. You may have some bruising on the scrotum and along the penis. This is normal. Men will need to wear a jockstrap or briefs, not boxers, for scrotal support for several days after a groin (inguinal) hernia repair. Spandex bicycle shorts may provide good support. This care sheet gives you a general idea about how long it will take for you to recover. But each person recovers at a different pace. Follow the steps below to get better as quickly as possible. How can you care for yourself at home? Activity  · Rest when you feel tired. Getting enough sleep will help you recover. Sleep with your head up by using three or four pillows. You can also try to sleep with your head up in a recliner chair. Do not sleep on your side or stomach. · Try to walk each day. Start by walking a little more than you did the day before. Bit by bit, increase the amount you walk. Walking boosts blood flow and helps prevent pneumonia and constipation. · Put ice or a cold pack on the area of your hernia repair for 10 to 20 minutes at a time. Try to do this every 1 to 2 hours for the first 24 hours (when you are awake) or until the swelling goes down. Put a thin cloth between the ice and your skin. · Avoid strenuous activities, such as biking, jogging, weight lifting, or aerobic exercise, until your doctor says it is okay. · Avoid lifting anything that would make you strain. This may include heavy grocery bags and milk containers, a heavy briefcase or backpack, cat litter or dog food bags, a vacuum , or a child. · You may drive when you are no longer taking pain medicine and can quickly move your foot from the gas pedal to the brake. You must also be able to sit comfortably for a long period of time, even if you do not plan to go far.  You might get caught in traffic. · Most people are able to return to work within 1 to 2 weeks after surgery. · You may shower 24 to 48 hours after surgery, if your doctor okays it. Pat the cut (incision) dry. Do not take a bath for the first 2 weeks, or until your doctor tells you it is okay. · Your doctor will tell you when you can have sex again. Diet  · You can eat your normal diet. If your stomach is upset, try bland, low-fat foods like plain rice, broiled chicken, toast, and yogurt. · Drink plenty of fluids (unless your doctor tells you not to). · You may notice that your bowel movements are not regular right after your surgery. This is common. Avoid constipation and straining with bowel movements. You may want to take a fiber supplement every day. If you have not had a bowel movement after a couple of days, ask your doctor about taking a mild laxative. Medicines  · Take pain medicines exactly as directed. ¨ If the doctor gave you a prescription medicine for pain, take it as prescribed. ¨ If you are not taking a prescription pain medicine, take an over-the-counter medicine such as acetaminophen (Tylenol), ibuprofen (Advil, Motrin), or naproxen (Aleve). Read and follow all instructions on the label. ¨ Do not take two or more pain medicines at the same time unless the doctor told you to. Many pain medicines have acetaminophen, which is Tylenol. Too much acetaminophen (Tylenol) can be harmful. · If your doctor prescribed antibiotics, take them as directed. Do not stop taking them just because you feel better. You need to take the full course of antibiotics. · If you think your pain medicine is making you sick to your stomach:  ¨ Take your medicine after meals (unless your doctor has told you not to). ¨ Ask your doctor for a different pain medicine. Incision care  · Wash the area daily with warm, soapy water and pat it dry. Follow-up care is a key part of your treatment and safety.  Be sure to make and go to all appointments, and call your doctor if you are having problems. It's also a good idea to know your test results and keep a list of the medicines you take. When should you call for help? Call 911 anytime you think you may need emergency care. For example, call if:  · You passed out (lost consciousness). · You have sudden chest pain and shortness of breath, or you cough up blood. · You have severe pain in your belly. Call your doctor now or seek immediate medical care if:  · You are sick to your stomach and cannot keep fluids down. · You have signs of a blood clot, such as:  ¨ Pain in your calf, back of the knee, thigh, or groin. ¨ Redness and swelling in your leg or groin. · You have trouble passing urine or stool, especially if you have mild pain or swelling in your lower belly. · Bright red blood has soaked through the bandage over your incision. Watch closely for any changes in your health, and be sure to contact your doctor if:  · Your swelling is getting worse. · Your swelling is not going down. Where can you learn more? Go to [employer]  Enter Y604 in the search box to learn more about \"Hernia Repair: What to Expect at Home. \"   © 6174-4168 Healthwise, Incorporated. Care instructions adapted under license by [employer] (which disclaims liability or warranty for this information). This care instruction is for use with your licensed healthcare professional. If you have questions about a medical condition or this instruction, always ask your healthcare professional. [place] [address] any warranty or liability for your use of this information. Content Version: 0.5.37410;  Last Revised: January 21, 2011    ______________________________________________________________________    Anesthesia Discharge Instructions    After general anesthesia or intervenous sedation, for 24 hours or while taking prescription Narcotics:  · Limit your activities  · Do not drive or operate hazardous machinery  · If you have not urinated within 8 hours after discharge, please contact your surgeon on call. · Do not make important personal or business decisions  · Do not drink alcoholic beverages    Report the following to your surgeon:  · Excessive pain, swelling, redness or odor of or around the surgical area  · Temperature over 100.5 degrees  · Nausea and vomiting lasting longer than 4 hours or if unable to take medication  · Any signs of decreased circulation or nerve impairment to extremity:  Change in color, persistent numbness, tingling, coldness or increased pain. · Any questions      Patient Education        Learning About Coronavirus [phone])  Coronavirus [phone]): Overview  What is coronavirus (BEL-04)? The coronavirus disease (COVID-19) is caused by a virus. It is an illness that was first found in December 2019. It has since spread worldwide. The virus can cause fever, cough, and trouble breathing. In severe cases, it can cause pneumonia and make it hard to breathe without help. It can cause death. This virus spreads person-to-person through droplets from coughing and sneezing. It can also spread when you are close to someone who is infected. And it can spread when you touch something that has the virus on it, such as a doorknob or a tabletop. Coronaviruses are a large group of viruses. They cause the common cold. They also cause more serious illnesses like Middle East respiratory syndrome (MERS) and severe acute respiratory syndrome (SARS). COVID-19 is caused by a novel coronavirus. That means it's a new type that has not been seen in people before. How is COVID-19 treated? Mild illness can be treated at home, but more serious illness needs to be treated in the hospital. Treatment may include medicines to reduce symptoms, plus breathing support such as oxygen therapy or a ventilator.  Other treatments, such as antiviral medicines, may help people who have COVID-19. What can you do to protect yourself from COVID-19? The best way to protect yourself from getting sick is to:  · Avoid areas where there is an outbreak. · Avoid contact with people who may be infected. · Avoid crowds and try to stay at least 6 feet away from other people. · Wash your hands often, especially after you cough or sneeze. Use soap and water, and scrub for at least 20 seconds. If soap and water aren't available, use an alcohol-based hand . · Avoid touching your mouth, nose, and eyes. What can you do to avoid spreading the virus to others? To help avoid spreading the virus to others:  · Wash your hands often with soap or alcohol-based hand sanitizers. · Cover your mouth with a tissue when you cough or sneeze. Then throw the tissue in the trash. · Use a disinfectant to clean things that you touch often. These include doorknobs, remote controls, phones, and handles on your refrigerator and microwave. And don't forget countertops, tabletops, bathrooms, and computer keyboards. · Wear a cloth face cover if you have to go to public areas. If you know or suspect that you have COVID-19:  · Stay home. Don't go to school, work, or public areas. And don't use public transportation, ride-shares, or taxis unless you have no choice. · Leave your home only if you need to get medical care or testing. But call the doctor's office first so they know you're coming. And wear a face cover. · Limit contact with people in your home. If possible, stay in a separate bedroom and use a separate bathroom. · Wear a face cover whenever you're around other people. It can help stop the spread of the virus when you cough or sneeze. · Clean and disinfect your home every day. Use household  and disinfectant wipes or sprays. Take special care to clean things that you grab with your hands. · Self-isolate until it's safe to be around others again.   ? If you have symptoms, it's safe when you haven't had a fever for 3 days and your symptoms have improved and it's been at least 10 days since your symptoms started. ? If you were exposed to the virus but don't have symptoms, it's safe to be around others 14 days after exposure. ? Talk to your doctor about whether you also need testing, especially if you have a weakened immune system. When to call for help  Call 911 anytime you think you may need emergency care. For example, call if:  · You have severe trouble breathing. (You can't talk at all.)  · You have constant chest pain or pressure. · You are severely dizzy or lightheaded. · You are confused or can't think clearly. · Your face and lips have a blue color. · You passed out (lost consciousness) or are very hard to wake up. Call your doctor now if you develop symptoms such as:  · Shortness of breath. · Fever. · Cough. If you need to get care, call ahead to the doctor's office for instructions before you go. Make sure you wear a face cover to prevent exposing other people to the virus. Where can you get the latest information? The following health organizations are tracking and studying this virus. Their websites contain the most up-to-date information. [facility] also learn what to do if you think you may have been exposed to the virus. · U.S. Centers for Disease Control and Prevention (CDC): The CDC provides updated news about the disease and travel advice. The website also tells you how to prevent the spread of infection. www.cdc.gov  · World Health Organization [facility]): WHO offers information about the virus outbreaks. WHO also has travel advice. www.who.int  Current as of: July 10, 2020               Content Version: 12.6  © 2006-2020 Healthwise, Incorporated. Care instructions adapted under license by [employer] (which disclaims liability or warranty for this information).  If you have questions about a medical condition or this instruction, always ask your healthcare professional. [employer] disclaims any warranty or liability for your use of this information.

## 2020-12-11 NOTE — TELEPHONE ENCOUNTER
Triage for Controlled Substance Refill Request    Pain Diagnosis: Acute low back pain    Last Outpatient Visit: 12/2/20    Next Outpatient Visit:1/13/2021    Reason for refill needed outside of office visit?     -Appointment not scheduled prior to need for scheduled refill    Pharmacy: [address]     Medication:Oxycontin 20 mg  Dose and directions: 1 tab BID  Number dispensed: 30  Date filled ( or Pharmacy): 12/2/20  # left: 10     reviewed:yes    Date of Urine Drug Screen: n/a    Opioid Safety Handout given: yes    Appropriate for refill: yes    Action: Please sign

## 2020-12-11 NOTE — TELEPHONE ENCOUNTER
Spoke to Dexter salazar/Pharmacy and explained per my conversation with Dr. Alex Meraz he can hold the rx that was sent over today in case the patient runs out. Dexter expressed agreement and understanding.

## 2020-12-11 NOTE — TELEPHONE ENCOUNTER
Triage for Controlled Substance Refill Request     Pain Diagnosis: Acute low back pain     Last Outpatient Visit: 12/2/20     Next Outpatient Visit:1/13/2021     Reason for refill needed outside of office visit?     -Appointment not scheduled prior to need for scheduled refill     Pharmacy: [facility]/pharmacy [facility] [address]  Medication:Oxycontin 20 mg  Dose and directions: 1 tab BID  Number dispensed: 30  Date filled ( or Pharmacy): 12/2/20  # left: 10      reviewed:yes     Date of Urine Drug Screen: n/a     Opioid Safety Handout given: yes     Appropriate for refill: yes     Action: Please sign

## 2020-12-11 NOTE — ANESTHESIA PREPROCEDURE EVALUATION
Relevant Problems   No relevant active problems       Anesthetic History     PONV          Review of Systems / Medical History  Patient summary reviewed, nursing notes reviewed and pertinent labs reviewed    Pulmonary  Within defined limits        Undiagnosed apnea         Neuro/Psych       CVA  TIA     Cardiovascular    Hypertension        Dysrhythmias       Exercise tolerance: >4 METS     GI/Hepatic/Renal                Endo/Other    Diabetes         Other Findings            Physical Exam    Airway  Mallampati: I  TM Distance: > 6 cm  Neck ROM: normal range of motion   Mouth opening: Normal     Cardiovascular    Rhythm: regular  Rate: normal         Dental  No notable dental hx       Pulmonary  Breath sounds clear to auscultation               Abdominal         Other Findings            Anesthetic Plan    ASA: 2  Anesthesia type: general          Induction: Intravenous  Anesthetic plan and risks discussed with: Patient

## 2020-12-11 NOTE — H&P
Date of Surgery Update:  Ifrah Brooke. was seen and examined. History and physical has been reviewed. The patient has been examined. There have been no significant clinical changes since the completion of the originally dated History and Physical.  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 He is not on any systemic therapy currently. He now has a right inguinal hernia that is symptomatic. Plan for open right inguinal hernia repair with mesh. A complete discussion of the risks, benefits and alternatives to surgery were discussed with the patient who was keen to proceed. The patient was counseled at length about the risks of sofía Covid-19 during their perioperative period and any recovery window from their procedure. The patient was made aware that sofía Covid-19  may worsen their prognosis for recovering from their procedure and lend to a higher morbidity and/or mortality risk. All material risks, benefits, and reasonable alternatives including postponing the procedure were discussed. The patient does  wish to proceed with the procedure at this time. Signed By: Chrissy Johnson MD     December 11, 2020 10:55 AM         Please note from the office and include the additional information below:    Past Medical History  Past Medical History:   Diagnosis Date    Aneurysm ([address] 2008    CEREBRAL    Arrhythmia     Previous a.fib, ablation, NSR now; NO LONGER FOLLOWED BY CARDIOLOGIST.     Autoimmune disease ([address]     SJOGREN'S    Cancer ([address]     COMMON BILE DUCT, ADENOCARCINOMA    Diabetes ([address]     Family history of skin cancer     Hypertension     Sepsis ([address] 2017    STENTING TO BILE DUCT    Status post chemotherapy     Stroke [facility]) 2008    brain aneurysm - no deficits    Sun-damaged skin     Sunburn, blistering         Past Surgical History  Past Surgical History:   Procedure Laterality Date    HX CHOLECYSTECTOMY      HX GI      COLONOSCOPY, POLYPS (BENIGN)    HX GI  10/06/2017    Whipple Dr. Debra [facility]     HX GI  2020    WHIPPLE REVISION    HX HEART CATHETERIZATION  2005    Ablation     HX ORTHOPAEDIC  2000    Spinal fusion W/ HARDWARE    HX OTHER SURGICAL  11/07/2017    [place] Cath, Dr. Lenita Jeans  2017    PORTACATH - then removed    NEUROLOGICAL PROCEDURE UNLISTED  2005    Ting hole washout from cerebral hemorrhage        Social History  The patient Verna Day.  reports that he has never smoked. He has never used smokeless tobacco. He reports that he does not drink alcohol or use drugs.      Family History  Family History   Problem Relation Age of Onset    Cancer Father         Breast and Colon, MELANOMA    Hypertension Father     Cancer Mother         LEUKEMIA    No Known Problems Sister     Atrial Fibrillation Brother     No Known Problems Sister     No Known Problems Son     No Known Problems Son    [facility] Anesth Problems Neg Hx           Pancho Henry MD

## 2020-12-11 NOTE — TELEPHONE ENCOUNTER
Spoke to pharmacist who stated they have a current rx for the patient for Oxycontin 20mg 30 tabs he received on yesterday. Stated patient should have some tabs from a previous 15 day supply. Wanted to know if Dr. Mary Clancy wants him to fill the rx for Dilaudid he received today. Told Pharmacist I will contact Dr. Mary Clancy and return the call.

## 2020-12-14 ENCOUNTER — PATIENT OUTREACH (OUTPATIENT)
Dept: OTHER | Age: 65
End: 2020-12-14

## 2020-12-14 NOTE — PROGRESS NOTES
CM  follow up call. Patient with cholangiocarcinoma s/p pylorus-preserving Whipple 10/2017; G_J George-EN Y revision 2020, HTN, DM, BPH and chronic LBP  * OP hernia repair     Chart review:  OP surgery as planned. Contacted  patient for CM follow up services. Verified  and address for HIPAA security. * MED CHANGES: No changes except for timing due to surgical pain. * MD APTS:    Surgical Virtual FU. * Patient Concerns:   None  * CM Concerns:  None  * Education/ Resources. Encouraged incentive spirometer use and hydration. * Mr. Ayala Hernandez reports surgery went well. Able to come home same day. - Wearing briefs/ using ice for swelling.     - Has not made any changes to LBP. * His wife is with him at home. Taking good care of him. Surgical/Wound Condition Focused Assessment    Skin- any open wounds or incisions? yes  Description and location of wound- Hernia repair site. C/D/I   In the last 24 hour have you experienced; Fever no    Low body temperature no    Chills or shaking no    Sweating no    Fast heart rate no    Fast breathing no    Dizziness/lightheadedness no    Confusion or unusual change in mental status no    Diarrhea no    Nausea no    Vomiting no    Shortness of breath or difficulty breathing no    Less urine output no    Cold, clammy, and pale skin no     Skin rash or skin color changes no  New or worsening pain? yes  If yes, pain rated 0-10: 4-7 Location/pain characteristics: Surgical site/  LPB   New or worsening numbness or tingling? no  Activity level- moving several times a day, or as recommended? yes   Bathing and showering instructions? yes  Nutrition- prescribed diet? no   Tolerating food? yes  Hydration- (how much in ounces per day) 3-4 glasses  Medications- new antibiotic? no             Pain medication? Standing pain management from palliative care. Does patient understand how and when to take their medications?  yes  Does patient have incentive spirometer? yes  If yes, how frequently is patient using incentive spirometer? Forgot about it. CM explains anesthesia risk and pnx prevention. * Discussed no current needs post op- will call CM if needs arise. OK to wait for next call after holidays. Will follow for CM services. Next planned outreach:  Tues 1/12/21        Chart Review:   OP hernia repair 12/11    DEC  30  VV POST OP 11:00 AM  Shasha Rodgers NP  [facility]  [place] [address]  [phone]    Inguinal Hernia Repair: What to Expect at [address] are likely to have pain for the next few days. You may also feel like you have the flu, and you may have a low fever and feel tired and nauseated. This is common. You should feel better after a few days and will probably feel much better in 7 days. For several weeks you may feel twinges or pulling in the hernia repair when you move. You may have some bruising on the scrotum and along the penis. This is normal. Men will need to wear a jockstrap or briefs, not boxers, for scrotal support for several days after a groin (inguinal) hernia repair. Spandex bicycle shorts may provide good support. This care sheet gives you a general idea about how long it will take for you to recover. But each person recovers at a different pace. Follow the steps below to get better as quickly as possible. How can you care for yourself at home? Activity  Rest when you feel tired. Getting enough sleep will help you recover. Sleep with your head up by using three or four pillows. You can also try to sleep with your head up in a recliner chair. Do not sleep on your side or stomach. Try to walk each day. Start by walking a little more than you did the day before. Bit by bit, increase the amount you walk. Walking boosts blood flow and helps prevent pneumonia and constipation.   Put ice or a cold pack on the area of your hernia repair for 10 to 20 minutes at a time. Try to do this every 1 to 2  hours for the first 24 hours (when you are awake) or until the swelling goes down. Put a thin cloth between the ice and your skin. Avoid strenuous activities, such as biking, jogging, weight lifting, or aerobic exercise, until your doctor says it is okay. Avoid lifting anything that would make you strain. This may include heavy grocery bags and milk containers, a heavy  briefcase or backpack, cat litter or dog food bags, a vacuum , or a child. You may drive when you are no longer taking pain medicine and can quickly move your foot from the gas pedal to  the brake. You must also be able to sit comfortably for a long period of time, even if you do not plan to go far. You  might get caught in traffic. Most people are able to return to work within 1 to 2 weeks after surgery. You may shower 24 to 48 hours after surgery, if your doctor okays it. Pat the cut (incision) dry. Do not take a bath for the first 2 weeks, or until your doctor tells you it is okay. Your doctor will tell you when you can have sex again. Diet  You can eat your normal diet. If your stomach is upset, try bland, low-fat foods like plain rice, broiled chicken, toast, and yogurt. Drink plenty of fluids (unless your doctor tells you not to). You may notice that your bowel movements are not regular right after your surgery. This is common. Avoid  constipation and straining with bowel movements. You may want to take a fiber supplement every day. If you have not had a bowel movement after a couple of days, ask your doctor about taking a mild laxative.

## 2020-12-17 ENCOUNTER — TELEPHONE (OUTPATIENT)
Dept: INTERNAL MEDICINE CLINIC | Age: 65
End: 2020-12-17

## 2020-12-18 ENCOUNTER — TELEPHONE (OUTPATIENT)
Dept: PALLATIVE CARE | Age: 65
End: 2020-12-18

## 2020-12-18 ENCOUNTER — TELEPHONE (OUTPATIENT)
Dept: INTERNAL MEDICINE CLINIC | Age: 65
End: 2020-12-18

## 2020-12-18 RX ORDER — DEXAMETHASONE 2 MG/1
2 TABLET ORAL 2 TIMES DAILY WITH MEALS
Qty: 14 TAB | Refills: 0 | Status: SHIPPED | OUTPATIENT
Start: 2020-12-18 | End: 2020-12-31

## 2020-12-18 RX ORDER — PANTOPRAZOLE SODIUM 40 MG/1
40 TABLET, DELAYED RELEASE ORAL DAILY
Qty: 30 TAB | Refills: 0 | Status: ON HOLD | OUTPATIENT
Start: 2020-12-18 | End: 2021-01-11

## 2020-12-18 NOTE — TELEPHONE ENCOUNTER
Patient states he needs to get his medical records faxed over for him to be able to get an Appt with Dr. Mannie Santa office. Please call patient if any questions or to advise when done so he can call for appt. Also Please see My Chart Message that does not show if or when faxed.      Dr. Barrera Roots office Fax# is [phone]

## 2020-12-18 NOTE — TELEPHONE ENCOUNTER
Returned call to patient and wife,Per Dr Jen Bello ,  Dexamethasone 2mg , 1 tab BID , for one week, Protonix 40 mg once daily, to local Pharmacy , Message to oncology for appointment needs.

## 2020-12-18 NOTE — TELEPHONE ENCOUNTER
----- Message from [place]. Jorge Ordoñez. sent at 12/18/2020  3:56 PM EST -----  Regarding: Update Medical Information  Contact: [phone]  Ton Durant back pain has gotten progressively worse over the past several days. Dr. Jayna Alvarez's office called today and said that there is nothing surgical that  can be done for him by looking at his records. He refused to see him for an appointment.   Ross Myers is taking the following medication:  1. 2 tabs(4mg) hydromorphone q 6 hours- 30 tabs remaining  2. 1 tab oxycontin ER BID- 30 tabs remaining  3.  1 tab q 6 hours Tramadol 50 mg -32 tabs remaining        Thank You,  [facility]

## 2020-12-21 DIAGNOSIS — K85.90 ACUTE PANCREATITIS WITHOUT INFECTION OR NECROSIS, UNSPECIFIED PANCREATITIS TYPE: Primary | ICD-10-CM

## 2020-12-21 DIAGNOSIS — C22.1 CHOLANGIOCARCINOMA (HCC): ICD-10-CM

## 2020-12-21 RX ORDER — PANCRELIPASE 36000; 180000; 114000 [USP'U]/1; [USP'U]/1; [USP'U]/1
4 CAPSULE, DELAYED RELEASE PELLETS ORAL
Qty: 1080 CAP | Refills: 3 | Status: SHIPPED | OUTPATIENT
Start: 2020-12-21 | End: 2021-01-01

## 2020-12-21 RX ORDER — DUTASTERIDE 0.5 MG/1
0.5 CAPSULE, LIQUID FILLED ORAL DAILY
Qty: 90 CAP | Refills: 3 | Status: SHIPPED | OUTPATIENT
Start: 2020-12-21 | End: 2021-01-01

## 2020-12-21 RX ORDER — OXYCODONE HCL 20 MG/1
20 TABLET, FILM COATED, EXTENDED RELEASE ORAL EVERY 8 HOURS
Qty: 90 TAB | Refills: 0 | Status: SHIPPED | OUTPATIENT
Start: 2020-12-21 | End: 2020-12-31

## 2020-12-21 NOTE — TELEPHONE ENCOUNTER
Triage for Controlled Substance Refill Request    Pain Diagnosis: _Cholangiocarcinoma    Last Outpatient Visit: _12/2/2020    Next Outpatient Visit: _1/13/2020    Reason for refill needed outside of office visit?  -Pain escalation requiring increased medication      Pharmacy: _[facility] [address]      Medication:Oxycontin 20 mg tab   Dose and directions:1 tab by mouth every 12 hours.   Number dispensed:60  Date filled ( or Pharmacy):  #left: 20     reviewed: _yes     Date of Urine Drug Screen:  _n/a     Opioid Safety Handout given:  _yes    Appropriate for refill:  _yes     Action:  _ please refill

## 2020-12-21 NOTE — TELEPHONE ENCOUNTER
----- Message from [place]. Matthew Castelan. sent at 12/21/2020  9:22 AM EST -----  Regarding: Visit Follow-Up Question  Contact: [phone]  Ton Addison,  The medication adjusted Friday did relieve the pain. Oxycontin ER-20 tablets remaining. We have a virtual visit with Dr Joslyn Paris Tuesday at [address] [facility] is [facility] on [address] [place] [address]. Thank you for your support!   Deronda Cushing and Shara Zamora

## 2020-12-21 NOTE — PROGRESS NOTES
Indio Mcclain. is a 60 y.o. male here for follow up for cholangiocarcinoma. Pain meds were recently adjusted by Palliative for back pain and has helped  His back pain from 2 - 6 am is the worse of it and he doesn't understand why. He is down to 141 lbs now. Was 150 after whipple revision. He is trying to eat. No vomiting or nausea since surgery. Thinks nerve block has worn off.    1. Have you been to the ER, urgent care clinic since your last visit? Hospitalized since your last visit? 12/11/20 hernia repair    2. Have you seen or consulted any other health care providers outside of the [address] [place] [address] since your last visit? Include any pap smears or colon screening.   no

## 2020-12-22 ENCOUNTER — VIRTUAL VISIT (OUTPATIENT)
Dept: ONCOLOGY | Age: 65
End: 2020-12-22
Payer: COMMERCIAL

## 2020-12-22 DIAGNOSIS — M54.9 BACK PAIN, UNSPECIFIED BACK LOCATION, UNSPECIFIED BACK PAIN LATERALITY, UNSPECIFIED CHRONICITY: ICD-10-CM

## 2020-12-22 DIAGNOSIS — C22.1 CHOLANGIOCARCINOMA OF BILIARY TRACT (HCC): Primary | ICD-10-CM

## 2020-12-22 DIAGNOSIS — G89.3 CANCER RELATED PAIN: ICD-10-CM

## 2020-12-22 PROCEDURE — 99213 OFFICE O/P EST LOW 20 MIN: CPT | Performed by: INTERNAL MEDICINE

## 2020-12-22 RX ORDER — OXYCODONE HCL 20 MG/1
20 TABLET, FILM COATED, EXTENDED RELEASE ORAL EVERY 12 HOURS
COMMUNITY
End: 2020-12-31

## 2020-12-22 RX ORDER — TRAMADOL HYDROCHLORIDE 50 MG/1
50 TABLET ORAL
COMMUNITY
End: 2021-01-08

## 2020-12-22 RX ORDER — HYDROMORPHONE HYDROCHLORIDE 8 MG/1
8 TABLET ORAL
COMMUNITY
End: 2020-12-31

## 2020-12-22 NOTE — PROGRESS NOTES
[facility]  [address] [facility], [address] [place] [address]  [address] [place] [address] Ne, [address] [facility]  [phone]       Follow-up Note      Patient: Indio Donovan MRN: [id number]  SSN: [id number]    YOB: 1955  Age: 60 y.o. Sex: male          Reason for Visit:   Indio Donovan is a 60 y.o. male who is seen by synchronous (real-time) audio-video technology for follow up of cholangiocarcinoma      Diagnosis:     1. 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 Treatment:     1. S/P Pancreatoduodenectomy on  10/06/2017  2. Adjuvant monotherapy with Capecitabine - s/p 6 cycles   (12/4/2017 - 05/2018)    Subjective:      Indio Donovan is a 60 y.o. male with a diagnosis of extrahepatic cholangiocarcinoma. He presented to the ED in August 2017 with obstructive jaundice. He was found to have an obstructive lesion in the dital bile duct. The CT showed marked intrahepatic biliary dilation and common bile duct dilation to the level of the mid common bile duct, which ws markedly narrowed. He underwent a stenting procedure followed by spyglass cholangiogram. The brushings revealed a diagnosis of cholangiocarcinoma. He underwent a Whipple procedure on 10/06/2017. He completed 6 cycles of adjuvant Xeloda. PET scan showed increased activity in the soft tissue along the SMA. CT guided biopsy showed local recurrence. He underwent SBRT by Dr. Basil Montiel in April 2020. PET shows residual disease. Pain is poorly controlled with narcotics. He is loosing weight.        Review of Systems:    Constitutional: negative  Eyes: negative  Ears, Nose, Mouth, Throat, and Face: negative  Respiratory: negative  Cardiovascular: negative  Gastrointestinal: abdominal pain   Genitourinary:negative  Integument/Breast: negative  Hematologic/Lymphatic: negative  Musculoskeletal:negative  Neurological: negative      Past Medical History:   Diagnosis Date    Aneurysm ([facility] 2008    CEREBRAL    Arrhythmia     Previous a.fib, ablation, NSR now; NO LONGER FOLLOWED BY CARDIOLOGIST.  Autoimmune disease ([facility]     SJOGREN'S    Cancer ([facility]     COMMON BILE DUCT, ADENOCARCINOMA    Diabetes ([facility]     Family history of skin cancer     Hypertension     Sepsis ([facility] 2017    STENTING TO BILE DUCT    Status post chemotherapy     Stroke [facility]) 2008    brain aneurysm - no deficits    Sun-damaged skin     Sunburn, blistering      Past Surgical History:   Procedure Laterality Date    HX CHOLECYSTECTOMY      HX GI      COLONOSCOPY, POLYPS (BENIGN)    HX GI  10/06/2017    Whipple Dr. Kandy Rivas [facility]     HX GI  2020    WHIPPLE REVISION    HX HEART CATHETERIZATION  2005    Ablation     HX ORTHOPAEDIC  2000    Spinal fusion W/ HARDWARE    HX OTHER SURGICAL  11/07/2017    [place] Cath, Dr. Claybon Opitz  2017    PORTACATH - then removed    NEUROLOGICAL PROCEDURE UNLISTED  2005    Suezanne Nicks hole washout from cerebral hemorrhage      Family History   Problem Relation Age of Onset    Cancer Father         Breast and Colon, MELANOMA    Hypertension Father     Cancer Mother         LEUKEMIA    No Known Problems Sister     Atrial Fibrillation Brother     No Known Problems Sister     No Known Problems Son     No Known Problems Son     Anesth Problems Neg Hx      Social History     Tobacco Use    Smoking status: Never Smoker    Smokeless tobacco: Never Used   Substance Use Topics    Alcohol use: Never     Frequency: Never     Comment: very rare      Prior to Admission medications    Medication Sig Start Date End Date Taking? Authorizing Provider   HYDROmorphone (DILAUDID) 8 mg tablet Take 8 mg by mouth every four (4) hours as needed for Pain. Yes Provider, Historical   oxyCODONE ER (OxyCONTIN) 20 mg ER tablet Take 20 mg by mouth every twelve (12) hours. Yes Provider, Historical   traMADoL (ULTRAM) 50 mg tablet Take 50 mg by mouth every six (6) hours as needed for Pain. Yes Provider, Historical   dutasteride (AVODART) 0.5 mg capsule Take 1 Cap by mouth daily. 12/21/20  Yes Jaz Vizcarra,    Creon 36,000-114,000- 180,000 unit cpDR capsule Take 4 Caps by mouth three (3) times daily (with meals). 2-3 caps each meal and 1 cap for snacks (see additional order) 12/21/20  Yes Silas Raya III, DO   dexAMETHasone (DECADRON) 2 mg tablet Take 1 Tab by mouth two (2) times daily (with meals). 12/18/20  Yes Gin Goodwin MD   pantoprazole (PROTONIX) 40 mg tablet Take 1 Tab by mouth daily. 12/18/20  Yes Gin Goodwin MD   naloxone [facility]) 4 mg/actuation nasal spray Use 1 spray intranasally, then discard. Repeat with new spray every 2 min as needed for opioid overdose symptoms, alternating nostrils. 12/11/20  Yes Steffany Bryant MD   aluminum & magnesium hydroxide-simethicone (Maalox Maximum Strength) 400-400-40 mg/5 mL suspension Take 10 mL by mouth every six (6) hours as needed for Indigestion. Yes Provider, Historical   vitamin A (AQUASOL A) 10,000 unit capsule Take 1 Cap by mouth daily. 10/26/20  Yes Silas Raya III,    vitamin E (AQUA GEMS) 400 unit capsule Take 1 Cap by mouth daily. 10/26/20  Yes Silas Raya III, DO   empagliflozin (Jardiance) 25 mg tablet Take 1 Tab by mouth nightly. 10/20/20  Yes Silas Raya III, DO   gabapentin (NEURONTIN) 100 mg capsule Take 3 capsules by mouth twice a day.  10/20/20  Yes Silas Raya III, DO   cyanocobalamin (VITAMIN B12) 1,000 mcg/mL injection INJECT CONTENTS OF ONE VIAL INTRAMUSCULARLY EVERY OTHER WEEK  Patient taking differently: INJECT CONTENTS OF ONE VIAL INTRAMUSCULARLY EVERY OTHER WEEK (FIRST AND 15TH OF EACH MONTH) 10/20/20  Yes Czajkowski, Silas B III, DO   tamsulosin (FLOMAX) 0.4 mg capsule TAKE ONE CAPSULE BY MOUTH EVERY EVENING 10/20/20  Yes Silas Raya III, DO   ramipriL (ALTACE) 5 mg capsule Take 1 Cap by mouth daily. 10/20/20  Yes Silas Raya III, DO   lidocaine (LIDODERM) 5 % 1 Patch by TransDERmal route every twenty-four (24) hours. Apply patch to the affected area for 12 hours a day and remove for 12 hours a day. Patient taking differently: 1 Patch by TransDERmal route daily as needed. Apply patch to the affected area for 12 hours a day and remove for 12 hours a day. 10/15/20  Yes Silas Raya III, DO   loperamide (IMODIUM) 2 mg capsule Take 2-4 mg by mouth as needed for Diarrhea. Give 4 mg after first loose stool. Give an additional 2 mg after each loose stool as needed up to a maximum of 8 doses (16 mg/day). Yes Provider, Historical   lipase-protease-amylase (Creon) 36,000-114,000- 180,000 unit cpDR capsule Take 1 Cap by mouth as needed (for snacks). 2-3 caps each meal and 1 cap for snacks (see additional order)   Yes Provider, Historical   finasteride (PROSCAR) 5 mg tablet TAKE 1 TABLET BY MOUTH EVERY DAY 4/13/20  Yes Provider, Historical   acetaminophen (TYLENOL) 325 mg tablet Take 2 Tabs by mouth every four (4) hours as needed for Pain or Fever (Over the counter medication). 1/2/20  Yes Sheldon Salas NP   sildenafil citrate (VIAGRA) 50 mg tablet Take 1 Tab by mouth as needed (ED). 5/6/19  Yes Silas Raya III, DO   alpha-D-galactosidase (BEANO PO) Take 1 Tab by mouth two (2) times a day. Yes Other, MD Flynn   cetirizine (ZYRTEC) 10 mg tablet Take 10 mg by mouth nightly. Yes Provider, Historical   oxyCODONE ER (OxyCONTIN) 20 mg ER tablet Take 1 Tab by mouth every eight (8) hours for 15 days.  Max Daily Amount: 60 mg. 12/21/20 1/5/21  Nick Overton MD          Allergies   Allergen Reactions    Shellfish Derived Anaphylaxis     Soft shell crabs    Morphine Nausea and Vomiting    Pcn [Penicillins] Other (comments)     Pt stated \"always been told PCN\"  Pt tolerated other cephalosporins in the past           Objective:     General: alert, cooperative, no distress   Mental  status: normal mood, behavior, speech, dress, motor activity, and thought processes, able to follow commands   HENT: NCAT   Neck: no visualized mass   Resp: no respiratory distress   Neuro: no gross deficits   Skin: no discoloration or lesions of concern on visible areas   Psychiatric: normal affect, consistent with stated mood, no evidence of hallucinations       Due to this being a TeleHealth evaluation (During [facility] public health emergency), many elements of the physical examination are unable to be assessed. Evaluation of the following organ systems was limited: Vitals/Constitutional/EENT/Resp/CV/GI//MS/Neuro/Skin/Heme-Lymph-Imm. PET Results (most recent):  Results from [place] encounter on 06/02/20   PET/CT TUMOR IMAGE SKULL THIGH (SUB)    Narrative PET/CT SCAN    PROCEDURE: Following IV injection of 10.4 mCi 18 Fluoro 2 deoxyglucose (FDG) and  a standard uptake delay, PET imaging is performed from head to thighs and axial,  sagittal and coronal images were acquired. Unenhanced CT is obtained for  anatomic localization, and attenuation correction of the PET scan. Patient  preprocedure blood glucose level: 91mg/dL. CORRELATIVE IMAGING STUDIES: CT CAP 5/13/2020. PRIOR PET: 1/21/2020. HISTORY: The study is requested for subsequent treatment strategy. Restaging  cholangiocarcinoma. FINDINGS:  HEAD/NECK: No apparent foci of abnormal hypermetabolism. Cerebral evaluation is  limited by normal intense activity. CHEST: No foci of abnormal hypermetabolism. ABDOMEN/PELVIS: The para-aortic soft tissue encasing the SMA is SUV 6, previous  4. SKELETON: No foci of abnormal hypermetabolism in the axial and visualized  appendicular skeleton. Impression IMPRESSION:   1. Persistent para-aortic tumor with minimal interval increased SUV.  2. Otherwise stable/negative PET.            I personally reviewed the images. Para aortic mass. Assessment:     1. Extrahepatic cholangiocarcinoma:    T3 N1 (1 of 12 LN +ve)  Invasion into pancreas  R0 resection    > S/P Pancreatoduodenectomy on  10/06/2017    Completed adjuvant treatment with single agent Capecitabine - s/p 6 cycles (12/4/2017 - 05/2018). Now with local recurrence in the para-aortic soft tissue     CT guided biopsy 2/3/2020  Of para -aortic soft tissue mass - + for adenocarcinoma  S/P SBRT     Repeat CT - ? Progression. Persistent para-aortic tumor. Abdominal pain is worse. He is loosing weight. I will obtain repeat imaging with MRI of the abdomen. 2. Cancer related pain    Repeat celiac plexus block per Dr. Betts Erp:       > MRI abdomen  > Repeat celiac plexus block per Dr. Dominique Vera Return in 1-3 months depending on the CT report. Signed by: Ama Rebolledo MD                     December 22, 2020      CC. Jodie Lopez MD  CC. Debby Amor MD  CC. Valerie Bush MD  CC. Arielle Moraes MD  CC. Peter Haines MD      I was in the office while conducting this encounter. The patient was at his home. Consent:  He and/or his healthcare decision maker is aware that this patient-initiated Telehealth encounter is a billable service, with coverage as determined by his insurance carrier. He is aware that he may receive a bill and has provided verbal consent to proceed: Yes    Pursuant to the emergency declaration under the [address] and the [facility], [facility] waiver authority and the [employer] General Act, this Virtual  Visit was conducted, with patient's (and/or legal guardian's) consent, to reduce the patient's risk of exposure to COVID-19 and provide necessary medical care. Services were provided through a video synchronous discussion virtually to substitute for in-person visit.

## 2020-12-23 ENCOUNTER — TELEPHONE (OUTPATIENT)
Dept: PALLATIVE CARE | Age: 65
End: 2020-12-23

## 2020-12-23 DIAGNOSIS — C22.1 CHOLANGIOCARCINOMA (HCC): ICD-10-CM

## 2020-12-23 LAB
ALBUMIN SERPL-MCNC: 4.4 G/DL (ref 3.8–4.8)
ALBUMIN/GLOB SERPL: 2.4 {RATIO} (ref 1.2–2.2)
ALP SERPL-CCNC: 210 IU/L (ref 39–117)
ALT SERPL-CCNC: 65 IU/L (ref 0–44)
AST SERPL-CCNC: 67 IU/L (ref 0–40)
BILIRUB SERPL-MCNC: 0.6 MG/DL (ref 0–1.2)
BUN SERPL-MCNC: 20 MG/DL (ref 8–27)
BUN/CREAT SERPL: 23 (ref 10–24)
CALCIUM SERPL-MCNC: 9.6 MG/DL (ref 8.6–10.2)
CANCER AG19-9 SERPL-ACNC: 86 U/ML (ref 0–35)
CHLORIDE SERPL-SCNC: 100 MMOL/L (ref 96–106)
CO2 SERPL-SCNC: 23 MMOL/L (ref 20–29)
CREAT SERPL-MCNC: 0.87 MG/DL (ref 0.76–1.27)
GLOBULIN SER CALC-MCNC: 1.8 G/DL (ref 1.5–4.5)
GLUCOSE SERPL-MCNC: 146 MG/DL (ref 65–99)
POTASSIUM SERPL-SCNC: 4.7 MMOL/L (ref 3.5–5.2)
PROT SERPL-MCNC: 6.2 G/DL (ref 6–8.5)
SODIUM SERPL-SCNC: 138 MMOL/L (ref 134–144)

## 2020-12-23 RX ORDER — HYDROMORPHONE HYDROCHLORIDE 4 MG/1
8 TABLET ORAL
Qty: 120 TAB | Refills: 0 | Status: SHIPPED | OUTPATIENT
Start: 2020-12-23 | End: 2021-01-04 | Stop reason: SDUPTHER

## 2020-12-23 RX ORDER — HYDROMORPHONE HYDROCHLORIDE 4 MG/1
8 TABLET ORAL
Qty: 120 TAB | Refills: 0 | Status: SHIPPED | OUTPATIENT
Start: 2020-12-23 | End: 2020-12-23 | Stop reason: SDUPTHER

## 2020-12-23 NOTE — TELEPHONE ENCOUNTER
----- Message from Breann Covarrubias. sent at 12/22/2020  9:01 AM EST -----  Regarding: Prescription Question  Contact: [phone]  Hi Dr Shailesh Roberson needs a refill for the Hydromorphone 8mg every six hours. He has 6 tablets of the hydromorphone 4mg .   [facility] pharmacy on [address],  Thank you,  Moy Gaffney and Bo Back

## 2020-12-23 NOTE — TELEPHONE ENCOUNTER
The patient was returning a call to Dr. Vaughn Ramsey. He changed the schedule for taking meds and will see how that works. States you can call him at any time.

## 2020-12-23 NOTE — TELEPHONE ENCOUNTER
Triage for Controlled Substance Refill Request     Pain Diagnosis: _Cholangiocarcinoma     Last Outpatient Visit: _12/2/2020     Next Outpatient Visit: _1/13/2020     Reason for refill needed outside of office visit? -Appointment not scheduled prior to need for scheduled refill        [id number] [address]: _           Medication: Dilaudid 4 mg   Dose and directions:2 tab by mouth every 3 hours.   Number dispensed:120  Date filled ( or Pharmacy):12/2/2020  #left:      reviewed: _yes      Date of Urine Drug Screen:  _n/a      Opioid Safety Handout given:  _yes      Appropriate for refill:  _yes      Action:  _ yes , please refill

## 2020-12-23 NOTE — TELEPHONE ENCOUNTER
The patient's wife called back stating the script for Hydromorphone was called in to [facility] and they do not fill narcotics. She is asking to have that one removed because now it shows two refills for the medication.

## 2020-12-23 NOTE — TELEPHONE ENCOUNTER
Triage for Controlled Substance Refill Request    Pain Diagnosis: _Cholangiocarcinoma    Last Outpatient Visit: _12/2/2020    Next Outpatient Visit: _1/13/2020    Reason for refill needed outside of office visit? -Appointment not scheduled prior to need for scheduled refill      [id number] [address]: _        Medication: Dilaudid 4 mg   Dose and directions:2 tab by mouth every 3 hours.   Number dispensed:120  Date filled ( or Pharmacy):12/2/2020  #left:     reviewed: _yes     Date of Urine Drug Screen:  _n/a     Opioid Safety Handout given:  _yes     Appropriate for refill:  _yes     Action:  _ yes , please refill

## 2020-12-23 NOTE — TELEPHONE ENCOUNTER
The patient states he took his last two pills of Hydromorphone today. Received an email stating it was called in but the pharmacy has no record. [facility] on [address] [facility] [phone]. Please give him a cb once it has been called in.

## 2020-12-29 ENCOUNTER — APPOINTMENT (OUTPATIENT)
Dept: CT IMAGING | Age: 65
End: 2020-12-29
Attending: STUDENT IN AN ORGANIZED HEALTH CARE EDUCATION/TRAINING PROGRAM
Payer: COMMERCIAL

## 2020-12-29 ENCOUNTER — HOSPITAL ENCOUNTER (OUTPATIENT)
Age: 65
Setting detail: OBSERVATION
Discharge: HOME | End: 2020-12-31
Attending: STUDENT IN AN ORGANIZED HEALTH CARE EDUCATION/TRAINING PROGRAM | Admitting: HOSPITALIST
Payer: COMMERCIAL

## 2020-12-29 ENCOUNTER — APPOINTMENT (OUTPATIENT)
Dept: CT IMAGING | Age: 65
End: 2020-12-29
Attending: HOSPITALIST
Payer: COMMERCIAL

## 2020-12-29 DIAGNOSIS — R11.2 NAUSEA AND VOMITING, INTRACTABILITY OF VOMITING NOT SPECIFIED, UNSPECIFIED VOMITING TYPE: ICD-10-CM

## 2020-12-29 DIAGNOSIS — R10.9 INTRACTABLE ABDOMINAL PAIN: ICD-10-CM

## 2020-12-29 DIAGNOSIS — R10.33: Primary | ICD-10-CM

## 2020-12-29 DIAGNOSIS — C22.1 CHOLANGIOCARCINOMA OF BILIARY TRACT (HCC): ICD-10-CM

## 2020-12-29 DIAGNOSIS — C24.9 CHOLANGIOCARCINOMA DETERMINED BY BIOPSY OF BILIARY TRACT (HCC): ICD-10-CM

## 2020-12-29 DIAGNOSIS — M54.50 ACUTE MIDLINE LOW BACK PAIN WITHOUT SCIATICA: ICD-10-CM

## 2020-12-29 LAB
ALBUMIN SERPL-MCNC: 3.5 G/DL (ref 3.5–5)
ALBUMIN/GLOB SERPL: 1.2 {RATIO} (ref 1.1–2.2)
ALP SERPL-CCNC: 173 U/L (ref 45–117)
ALT SERPL-CCNC: 67 U/L (ref 12–78)
AMYLASE SERPL-CCNC: 16 U/L (ref 25–115)
ANION GAP SERPL CALC-SCNC: 3 MMOL/L (ref 5–15)
AST SERPL-CCNC: 20 U/L (ref 15–37)
BASOPHILS # BLD: 0 K/UL (ref 0–0.1)
BASOPHILS NFR BLD: 0 % (ref 0–1)
BILIRUB SERPL-MCNC: 1.4 MG/DL (ref 0.2–1)
BUN SERPL-MCNC: 16 MG/DL (ref 6–20)
BUN/CREAT SERPL: 22 (ref 12–20)
CALCIUM SERPL-MCNC: 8.9 MG/DL (ref 8.5–10.1)
CHLORIDE SERPL-SCNC: 97 MMOL/L (ref 97–108)
CO2 SERPL-SCNC: 32 MMOL/L (ref 21–32)
COMMENT, HOLDF: NORMAL
COVID-19 RAPID TEST, COVR: NOT DETECTED
CREAT SERPL-MCNC: 0.73 MG/DL (ref 0.7–1.3)
DIFFERENTIAL METHOD BLD: ABNORMAL
EOSINOPHIL # BLD: 0.3 K/UL (ref 0–0.4)
EOSINOPHIL NFR BLD: 4 % (ref 0–7)
ERYTHROCYTE [DISTWIDTH] IN BLOOD BY AUTOMATED COUNT: 13.1 % (ref 11.5–14.5)
GLOBULIN SER CALC-MCNC: 2.9 G/DL (ref 2–4)
GLUCOSE SERPL-MCNC: 99 MG/DL (ref 65–100)
HCT VFR BLD AUTO: 40.8 % (ref 36.6–50.3)
HGB BLD-MCNC: 13.6 G/DL (ref 12.1–17)
IMM GRANULOCYTES # BLD AUTO: 0.1 K/UL (ref 0–0.04)
IMM GRANULOCYTES NFR BLD AUTO: 1 % (ref 0–0.5)
LIPASE SERPL-CCNC: 42 U/L (ref 73–393)
LYMPHOCYTES # BLD: 0.4 K/UL (ref 0.8–3.5)
LYMPHOCYTES NFR BLD: 6 % (ref 12–49)
MCH RBC QN AUTO: 30.8 PG (ref 26–34)
MCHC RBC AUTO-ENTMCNC: 33.3 G/DL (ref 30–36.5)
MCV RBC AUTO: 92.5 FL (ref 80–99)
MONOCYTES # BLD: 0.7 K/UL (ref 0–1)
MONOCYTES NFR BLD: 10 % (ref 5–13)
NEUTS SEG # BLD: 5.3 K/UL (ref 1.8–8)
NEUTS SEG NFR BLD: 79 % (ref 32–75)
NRBC # BLD: 0 K/UL (ref 0–0.01)
NRBC BLD-RTO: 0 PER 100 WBC
PLATELET # BLD AUTO: 129 K/UL (ref 150–400)
PMV BLD AUTO: 10.9 FL (ref 8.9–12.9)
POTASSIUM SERPL-SCNC: 3.9 MMOL/L (ref 3.5–5.1)
PROT SERPL-MCNC: 6.4 G/DL (ref 6.4–8.2)
RBC # BLD AUTO: 4.41 M/UL (ref 4.1–5.7)
RBC MORPH BLD: ABNORMAL
SAMPLES BEING HELD,HOLD: NORMAL
SODIUM SERPL-SCNC: 132 MMOL/L (ref 136–145)
SOURCE, COVRS: NORMAL
SPECIMEN SOURCE, FCOV2M: NORMAL
SPECIMEN TYPE, XMCV1T: NORMAL
WBC # BLD AUTO: 6.8 K/UL (ref 4.1–11.1)

## 2020-12-29 PROCEDURE — 99218 HC RM OBSERVATION: CPT

## 2020-12-29 PROCEDURE — 82150 ASSAY OF AMYLASE: CPT

## 2020-12-29 PROCEDURE — 96374 THER/PROPH/DIAG INJ IV PUSH: CPT

## 2020-12-29 PROCEDURE — 74011250636 HC RX REV CODE- 250/636: Performed by: STUDENT IN AN ORGANIZED HEALTH CARE EDUCATION/TRAINING PROGRAM

## 2020-12-29 PROCEDURE — 74011250636 HC RX REV CODE- 250/636: Performed by: NURSE PRACTITIONER

## 2020-12-29 PROCEDURE — 36415 COLL VENOUS BLD VENIPUNCTURE: CPT

## 2020-12-29 PROCEDURE — 80053 COMPREHEN METABOLIC PANEL: CPT

## 2020-12-29 PROCEDURE — 74011250637 HC RX REV CODE- 250/637: Performed by: HOSPITALIST

## 2020-12-29 PROCEDURE — 74011000250 HC RX REV CODE- 250: Performed by: NURSE PRACTITIONER

## 2020-12-29 PROCEDURE — 87635 SARS-COV-2 COVID-19 AMP PRB: CPT

## 2020-12-29 PROCEDURE — 74011000258 HC RX REV CODE- 258: Performed by: HOSPITALIST

## 2020-12-29 PROCEDURE — 85025 COMPLETE CBC W/AUTO DIFF WBC: CPT

## 2020-12-29 PROCEDURE — 99223 1ST HOSP IP/OBS HIGH 75: CPT | Performed by: INTERNAL MEDICINE

## 2020-12-29 PROCEDURE — 71250 CT THORAX DX C-: CPT

## 2020-12-29 PROCEDURE — 74011250636 HC RX REV CODE- 250/636: Performed by: PHYSICIAN ASSISTANT

## 2020-12-29 PROCEDURE — 74011000636 HC RX REV CODE- 636: Performed by: STUDENT IN AN ORGANIZED HEALTH CARE EDUCATION/TRAINING PROGRAM

## 2020-12-29 PROCEDURE — 96375 TX/PRO/DX INJ NEW DRUG ADDON: CPT

## 2020-12-29 PROCEDURE — 74011250637 HC RX REV CODE- 250/637: Performed by: INTERNAL MEDICINE

## 2020-12-29 PROCEDURE — 96376 TX/PRO/DX INJ SAME DRUG ADON: CPT

## 2020-12-29 PROCEDURE — 74011250637 HC RX REV CODE- 250/637: Performed by: NURSE PRACTITIONER

## 2020-12-29 PROCEDURE — 74177 CT ABD & PELVIS W/CONTRAST: CPT

## 2020-12-29 PROCEDURE — 99282 EMERGENCY DEPT VISIT SF MDM: CPT

## 2020-12-29 PROCEDURE — 74011250637 HC RX REV CODE- 250/637: Performed by: PHYSICIAN ASSISTANT

## 2020-12-29 PROCEDURE — C9113 INJ PANTOPRAZOLE SODIUM, VIA: HCPCS | Performed by: PHYSICIAN ASSISTANT

## 2020-12-29 PROCEDURE — 74011250636 HC RX REV CODE- 250/636: Performed by: HOSPITALIST

## 2020-12-29 PROCEDURE — 74011000250 HC RX REV CODE- 250: Performed by: PHYSICIAN ASSISTANT

## 2020-12-29 PROCEDURE — 83690 ASSAY OF LIPASE: CPT

## 2020-12-29 RX ORDER — HYDROMORPHONE HYDROCHLORIDE 1 MG/ML
1 INJECTION, SOLUTION INTRAMUSCULAR; INTRAVENOUS; SUBCUTANEOUS
Status: COMPLETED | OUTPATIENT
Start: 2020-12-29 | End: 2020-12-29

## 2020-12-29 RX ORDER — HYDROMORPHONE HYDROCHLORIDE 2 MG/ML
2 INJECTION, SOLUTION INTRAMUSCULAR; INTRAVENOUS; SUBCUTANEOUS
Status: DISCONTINUED | OUTPATIENT
Start: 2020-12-29 | End: 2020-12-29

## 2020-12-29 RX ORDER — DOCUSATE SODIUM 100 MG/1
100 CAPSULE, LIQUID FILLED ORAL 2 TIMES DAILY
Status: DISCONTINUED | OUTPATIENT
Start: 2020-12-29 | End: 2020-12-31

## 2020-12-29 RX ORDER — HYDROMORPHONE HYDROCHLORIDE 2 MG/1
2 TABLET ORAL
Status: DISCONTINUED | OUTPATIENT
Start: 2020-12-29 | End: 2020-12-31

## 2020-12-29 RX ORDER — POLYETHYLENE GLYCOL 3350 17 G/17G
17 POWDER, FOR SOLUTION ORAL DAILY
Status: DISCONTINUED | OUTPATIENT
Start: 2020-12-30 | End: 2020-12-31 | Stop reason: HOSPADM

## 2020-12-29 RX ORDER — OXYCODONE HCL 10 MG/1
20 TABLET, FILM COATED, EXTENDED RELEASE ORAL EVERY 12 HOURS
Status: DISCONTINUED | OUTPATIENT
Start: 2020-12-29 | End: 2020-12-31

## 2020-12-29 RX ORDER — DEXTROSE MONOHYDRATE AND SODIUM CHLORIDE 5; .9 G/100ML; G/100ML
75 INJECTION, SOLUTION INTRAVENOUS CONTINUOUS
Status: DISCONTINUED | OUTPATIENT
Start: 2020-12-29 | End: 2020-12-31 | Stop reason: HOSPADM

## 2020-12-29 RX ORDER — TAMSULOSIN HYDROCHLORIDE 0.4 MG/1
0.4 CAPSULE ORAL DAILY
Status: DISCONTINUED | OUTPATIENT
Start: 2020-12-29 | End: 2020-12-29

## 2020-12-29 RX ORDER — HYDROMORPHONE HYDROCHLORIDE 2 MG/ML
2 INJECTION, SOLUTION INTRAMUSCULAR; INTRAVENOUS; SUBCUTANEOUS
Status: DISCONTINUED | OUTPATIENT
Start: 2020-12-29 | End: 2020-12-31 | Stop reason: HOSPADM

## 2020-12-29 RX ORDER — TAMSULOSIN HYDROCHLORIDE 0.4 MG/1
0.4 CAPSULE ORAL EVERY EVENING
Status: DISCONTINUED | OUTPATIENT
Start: 2020-12-29 | End: 2020-12-31 | Stop reason: HOSPADM

## 2020-12-29 RX ORDER — ONDANSETRON 2 MG/ML
4 INJECTION INTRAMUSCULAR; INTRAVENOUS
Status: COMPLETED | OUTPATIENT
Start: 2020-12-29 | End: 2020-12-29

## 2020-12-29 RX ADMIN — LIDOCAINE HYDROCHLORIDE 40 ML: 20 SOLUTION ORAL; TOPICAL at 22:48

## 2020-12-29 RX ADMIN — ONDANSETRON 4 MG: 2 INJECTION INTRAMUSCULAR; INTRAVENOUS at 13:36

## 2020-12-29 RX ADMIN — HYDROMORPHONE HYDROCHLORIDE 1 MG: 1 INJECTION, SOLUTION INTRAMUSCULAR; INTRAVENOUS; SUBCUTANEOUS at 14:25

## 2020-12-29 RX ADMIN — HYDROMORPHONE HYDROCHLORIDE 1 MG: 1 INJECTION, SOLUTION INTRAMUSCULAR; INTRAVENOUS; SUBCUTANEOUS at 13:36

## 2020-12-29 RX ADMIN — DEXTROSE AND SODIUM CHLORIDE 75 ML/HR: 5; 900 INJECTION, SOLUTION INTRAVENOUS at 16:46

## 2020-12-29 RX ADMIN — OXYCODONE HYDROCHLORIDE 20 MG: 10 TABLET, FILM COATED, EXTENDED RELEASE ORAL at 20:38

## 2020-12-29 RX ADMIN — PANTOPRAZOLE SODIUM 40 MG: 40 INJECTION, POWDER, FOR SOLUTION INTRAVENOUS at 20:38

## 2020-12-29 RX ADMIN — IOPAMIDOL 100 ML: 755 INJECTION, SOLUTION INTRAVENOUS at 13:44

## 2020-12-29 RX ADMIN — HYDROMORPHONE HYDROCHLORIDE 2 MG: 2 INJECTION INTRAMUSCULAR; INTRAVENOUS; SUBCUTANEOUS at 20:21

## 2020-12-29 RX ADMIN — TAMSULOSIN HYDROCHLORIDE 0.4 MG: 0.4 CAPSULE ORAL at 18:02

## 2020-12-29 RX ADMIN — LIDOCAINE HYDROCHLORIDE 40 ML: 20 SOLUTION ORAL; TOPICAL at 16:41

## 2020-12-29 RX ADMIN — SODIUM CHLORIDE 1000 ML: 9 INJECTION, SOLUTION INTRAVENOUS at 13:36

## 2020-12-29 RX ADMIN — DOCUSATE SODIUM 100 MG: 100 CAPSULE, LIQUID FILLED ORAL at 20:37

## 2020-12-29 RX ADMIN — HYDROMORPHONE HYDROCHLORIDE 2 MG: 2 INJECTION INTRAMUSCULAR; INTRAVENOUS; SUBCUTANEOUS at 23:23

## 2020-12-29 RX ADMIN — HYDROMORPHONE HYDROCHLORIDE 2 MG: 2 INJECTION INTRAMUSCULAR; INTRAVENOUS; SUBCUTANEOUS at 16:46

## 2020-12-29 NOTE — PROGRESS NOTES
[address]        Reason for Visit:   Nikolai Erickson is a 60 y.o. male who is seen in hospital consult for cholangiocarcinoma      Diagnosis:     1. 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 Treatment:     1. S/P Pancreatoduodenectomy on  10/06/2017  2. Adjuvant monotherapy with Capecitabine - s/p 6 cycles   (12/4/2017 - 05/2018)    HPI:      Pt seen today in hospital consult for cholangiocarcinoma not on any therapy/ pt of Dr Betzy Rivera  Admitted for uncontrolled abdominal and back pain. Pt has pin point low back pain. CT done:  IMPRESSION:   1. Surgical changes are again seen following Whipple procedure. Overall mildly  increased perivascular recurrence in the superior retroperitoneum. Left renal  vein occlusion and upper abdominal collateral vessels are again noted.    2. Round sclerotic lesion in the L5 vertebral body suspicious for metastasis. 3. Several scattered bilateral peripheral lower lung nodules measuring 1 to 2 mm  may represent infection, inflammation, or metastasis    Pt is in bed. States dilaudid is working. Sees palliative care as outpt. Wife is at bedside. Pt is for EGD tmw. Wife wants to have surgery see pt while here. Also wants MRI done/ states was to have MRI of spine. Pt and wife know that local recurrence is possible. Prior note from Dr Betzy Rivera:   Nikolai Erickson is a 60 y.o. male with a diagnosis of extrahepatic cholangiocarcinoma. He presented to the ED in August 2017 with obstructive jaundice. He was found to have an obstructive lesion in the dital bile duct. The CT showed marked intrahepatic biliary dilation and common bile duct dilation to the level of the mid common bile duct, which ws markedly narrowed.  He underwent a stenting procedure followed by spyglass cholangiogram. The brushings revealed a diagnosis of cholangiocarcinoma. He underwent a Whipple procedure on 10/06/2017. He completed 6 cycles of adjuvant Xeloda. PET scan showed increased activity in the soft tissue along the SMA. CT guided biopsy showed local recurrence. He underwent SBRT by Dr. Tia Douglas in April 2020. PET shows residual disease. Pain is poorly controlled with narcotics. He is loosing weight. Review of Systems:  Negative except Per HPI    Past Medical History:   Diagnosis Date    Aneurysm ([address] 2008    CEREBRAL    Arrhythmia     Previous a.fib, ablation, NSR now; NO LONGER FOLLOWED BY CARDIOLOGIST.     Autoimmune disease ([facility]     SJOGREN'S    Cancer ([facility]     COMMON BILE DUCT, ADENOCARCINOMA    Diabetes ([facility]     Family history of skin cancer     Hypertension     Sepsis ([facility] 2017    STENTING TO BILE DUCT    Status post chemotherapy     Stroke [facility]) 2008    brain aneurysm - no deficits    Sun-damaged skin     Sunburn, blistering      Past Surgical History:   Procedure Laterality Date    HX CHOLECYSTECTOMY      HX GI      COLONOSCOPY, POLYPS (BENIGN)    [facility]     HX GI  2020    WHIPPLE REVISION    HX HEART CATHETERIZATION  2005    Ablation     HX ORTHOPAEDIC  2000    Spinal fusion W/ HARDWARE    HX OTHER SURGICAL  11/07/2017    [place] Cath, Dr. Rico Mayberry  2017    PORTACATH - then removed    NEUROLOGICAL PROCEDURE UNLISTED  2005    Marlon Sprain hole washout from cerebral hemorrhage      Family History   Problem Relation Age of Onset    Cancer Father         Breast and Colon, MELANOMA    Hypertension Father     Cancer Mother         LEUKEMIA    No Known Problems Sister     Atrial Fibrillation Brother     No Known Problems Sister     No Known Problems Son     No Known Problems Son     Anesth Problems Neg Hx      Social History     Tobacco Use    Smoking status: Never Smoker    Smokeless tobacco: Never Used   Substance Use Topics  Alcohol use: Never     Frequency: Never     Comment: very rare      Prior to Admission medications    Medication Sig Start Date End Date Taking? Authorizing Provider   HYDROmorphone (DILAUDID) 4 mg tablet Take 2 Tabs by mouth every three (3) hours as needed for Pain for up to 15 days. Max Daily Amount: 64 mg. 12/23/20 1/7/21  Negrita Brasher MD   HYDROmorphone (DILAUDID) 8 mg tablet Take 8 mg by mouth every four (4) hours as needed for Pain. Provider, Historical   oxyCODONE ER (OxyCONTIN) 20 mg ER tablet Take 20 mg by mouth every twelve (12) hours. Provider, Historical   traMADoL (ULTRAM) 50 mg tablet Take 50 mg by mouth every six (6) hours as needed for Pain. Provider, Historical   oxyCODONE ER (OxyCONTIN) 20 mg ER tablet Take 1 Tab by mouth every eight (8) hours for 15 days. Max Daily Amount: 60 mg. 12/21/20 1/5/21  Tl Mcclendon MD   dutasteride (AVODART) 0.5 mg capsule Take 1 Cap by mouth daily. 12/21/20   Tarah OGDEN III,    Creon 36,000-114,000- 180,000 unit cpDR capsule Take 4 Caps by mouth three (3) times daily (with meals). 2-3 caps each meal and 1 cap for snacks (see additional order) 12/21/20   Jeet Raya III,    dexAMETHasone (DECADRON) 2 mg tablet Take 1 Tab by mouth two (2) times daily (with meals). 12/18/20   Tl Mcclendon MD   pantoprazole (PROTONIX) 40 mg tablet Take 1 Tab by mouth daily. 12/18/20   Tl Mcclendon MD   naloxone [facility]) 4 mg/actuation nasal spray Use 1 spray intranasally, then discard. Repeat with new spray every 2 min as needed for opioid overdose symptoms, alternating nostrils. 12/11/20   Abril Hernandez MD   aluminum & magnesium hydroxide-simethicone (Maalox Maximum Strength) 400-400-40 mg/5 mL suspension Take 10 mL by mouth every six (6) hours as needed for Indigestion. Provider, Historical   vitamin A (AQUASOL A) 10,000 unit capsule Take 1 Cap by mouth daily.  10/26/20   Tarah Zayas III, DO   vitamin E (AQUA GEMS) 400 unit capsule Take 1 Cap by mouth daily. 10/26/20   Chavez Witt III, DO   empagliflozin (Jardiance) 25 mg tablet Take 1 Tab by mouth nightly. 10/20/20   Librado Raya III, DO   gabapentin (NEURONTIN) 100 mg capsule Take 3 capsules by mouth twice a day. 10/20/20   Silas Raya III, DO   cyanocobalamin (VITAMIN B12) 1,000 mcg/mL injection INJECT CONTENTS OF ONE VIAL INTRAMUSCULARLY EVERY OTHER WEEK  Patient taking differently: INJECT CONTENTS OF ONE VIAL INTRAMUSCULARLY EVERY OTHER WEEK (FIRST AND 15TH OF EACH MONTH) 10/20/20   Chavez OGDEN III, DO   tamsulosin (FLOMAX) 0.4 mg capsule TAKE ONE CAPSULE BY MOUTH EVERY EVENING 10/20/20   Silas Raya III, DO   ramipriL (ALTACE) 5 mg capsule Take 1 Cap by mouth daily. 10/20/20   Silas Raya III, DO   lidocaine (LIDODERM) 5 % 1 Patch by TransDERmal route every twenty-four (24) hours. Apply patch to the affected area for 12 hours a day and remove for 12 hours a day. Patient taking differently: 1 Patch by TransDERmal route daily as needed. Apply patch to the affected area for 12 hours a day and remove for 12 hours a day. 10/15/20   Chavez Witt B III, DO   loperamide (IMODIUM) 2 mg capsule Take 2-4 mg by mouth as needed for Diarrhea. Give 4 mg after first loose stool. Give an additional 2 mg after each loose stool as needed up to a maximum of 8 doses (16 mg/day). Provider, Historical   lipase-protease-amylase (Creon) 36,000-114,000- 180,000 unit cpDR capsule Take 1 Cap by mouth as needed (for snacks). 2-3 caps each meal and 1 cap for snacks (see additional order)    Provider, Historical   finasteride (PROSCAR) 5 mg tablet TAKE 1 TABLET BY MOUTH EVERY DAY 4/13/20   Provider, Historical   acetaminophen (TYLENOL) 325 mg tablet Take 2 Tabs by mouth every four (4) hours as needed for Pain or Fever (Over the counter medication).  1/2/20   Zach Tomlinson NP   sildenafil citrate (VIAGRA) 50 mg tablet Take 1 Tab by mouth as needed (ED). 5/6/19   Mary OGDEN III, DO   alpha-D-galactosidase (BEANO PO) Take 1 Tab by mouth two (2) times a day. Other, MD Flynn   cetirizine (ZYRTEC) 10 mg tablet Take 10 mg by mouth nightly. Provider, Historical          Allergies   Allergen Reactions    Shellfish Derived Anaphylaxis     Soft shell crabs    Morphine Nausea and Vomiting    Pcn [Penicillins] Other (comments)     Pt stated \"always been told PCN\"  Pt tolerated other cephalosporins in the past           Objective:     GENERAL: sleepy, cooperative, no distress  EYE: sclera anicteric  LUNG: clear to auscultation bilaterally  HEART: regular rate and rhythm  ABDOMEN: soft, non-tender  EXTREMITIES:  no edema  SKIN: Normal.  NEUROLOGIC: negative  PSYCH: alert, conversant  Musculoskeletal pin point back pain lower lumbar    CT Results (most recent):  Results from Hospital Encounter encounter on 12/29/20   CT ABD PELV W CONT    Narrative EXAM:  CT abdomen pelvis with contrast    INDICATION: Abdominal pain. History of cholangiocarcinoma. COMPARISON: CT 9/20/2020. MRI 9/3/2020. TECHNIQUE: Helical CT of the abdomen  and pelvis  following the uneventful  intravenous administration of nonionic contrast.  Coronal and sagittal reformats  are performed. CT dose reduction was achieved through use of a standardized  protocol tailored for this examination and automatic exposure control for dose  modulation. Adaptive statistical iterative reconstruction (ASIR) was utilized. FINDINGS:   The visualized lung bases demonstrate several scattered bilateral peripheral 1  to 2 mm nodules. The heart size is normal. There is no pericardial or pleural  effusion. Whipple surgical changes are again noted. There is stable pneumobilia. The  gallbladder is surgically absent. The spleen, remainder of the pancreas, and  adrenal glands are normal.     The kidneys are symmetric without hydronephrosis.  There is a stable left kidney  cyst.    Poorly defined retroperitoneal soft tissue attenuation surrounding the celiac,  superior mesenteric, and renal arteries measures 3.9 x 5.3 cm (series 3, image  33), previously 4.0 x 4.4 cm. Occlusion of the left renal vein is again seen  (series 3, image 34). Collateral vessels in the central upper abdomen are again  noted. There are no dilated bowel loops. The appendix is normal.      There are no enlarged lymph nodes. There is no free fluid or free air. The  aorta tapers without aneurysm. The urinary bladder is normal.  There is no pelvic mass. The prostate is not  enlarged. There is a round sclerotic bony lesion in the L5 vertebral body measuring 1.2 cm  (series 602, image 66). Posterior L5-S1 fusion hardware is noted. Impression IMPRESSION:   1. Surgical changes are again seen following Whipple procedure. Overall mildly  increased perivascular recurrence in the superior retroperitoneum. Left renal  vein occlusion and upper abdominal collateral vessels are again noted. 2. Round sclerotic lesion in the L5 vertebral body suspicious for metastasis. 3. Several scattered bilateral peripheral lower lung nodules measuring 1 to 2 mm  may represent infection, inflammation, or metastasis. Assessment/PLAN:     1. Extrahepatic cholangiocarcinoma  Pt of Dr Reyna Dial. Prior hx:  T3 N1 (1 of 12 LN +ve)  Invasion into pancreas  R0 resection   hx of Pancreatoduodenectomy on  10/06/2017  Completed adjuvant treatment with single agent Capecitabine - s/p 6 cycles (12/4/2017 - 05/2018). Now with local recurrence in the para-aortic soft tissue   CT guided biopsy 2/3/2020  Of para -aortic soft tissue mass - + for adenocarcinoma  S/P SBRT   Repeat CT - ? Progression. Persistent para-aortic tumor. Abdominal pain is worse. He is loosing weight. I will obtain repeat imaging with MRI of the abdomen.      Pt seen today in hospital consult / admitted for uncontrolled pain/ N/V/  Palliative care seeing pt as outpt. CT shows likely recurrence with retroperitoneal soft tissue mass 5.3 cm and ? L5 bone met and ? Lung nodules. CA 19-9 86. Reviewed CTs with pt and wife at bedside. CT chest already ordered. .  Wife teaches at our school of nursing. Pt and wife want to see surgery for eval of possible local recurrence and hernia. Ordered. Want to spine MRI while here. Ordered. Hx of spine surgery at L5 level in past so MRI will be helpful. If spine met, would consult Rad/onc. Have seen Dr Mariah Mackey in past.   Ordered palliative care eval for pain mgmt. COVID screening test ordered. No plans for any inpt chemo from us. Pt will fu with Dr Pierre Alert for outpt chemo if needed. 2. Cancer related pain  Per palliative care    3. N/V.  meds prn.     4.  Psychosocial.  Mood good. Wife at bedside. Wife teaches at our nursing school. Very supportive.      We will follow as needed  Pt will fu with Dr Pierre Alert for outpt treatment plan  Call if questions    Signed by: Joseph Jensen,                      December 29, 2020

## 2020-12-29 NOTE — CONSULTS
118 [facility].  217 [facility] 140 Octavio Pradhan, [address]  [phone]                     GI CONSULTATION NOTE      NAME:  Aaliyah Hendrix. :   1955   MRN:   [id number]     Consult Date: 2020     Chief Complaint: Abdominal pain    History of Present Illness:  Patient is a 60 y.o. who is seen in consultation at the request of Dr. Stewart Olivares for the above problem. Patient is known to us for h/o extrahepatic cholangiocarcinoma for which he had a pylorus-sparing Whipple in , then with revision to a George-n-Y in 2020 with Dr. Wan Alfred. He also had an EUS with celiac plexus neurolysis in Sept with Dr. Katt Hogan, and he is scheduled for OP repeat in 2021. He follows with Dr. Idalia Wheat, oncologist at [facility] and is to have a repeat MRI on . Patient presents to the ER today with a different type of abdominal pain, though. He states that about 3 days ago he started to have burning discomfort in the epigastrium, which has progressively worsened to become an intense burning that causes him to double over. He has been unable to sleep or get comfortable. This pain does not radiate. Eating does not affect it, though he has a poor appetite in general.  He has continued to lose weight, currently at weighing 139 lbs where he had been in the 150s in Sept.  He denies nausea and vomiting. He does report having intermittent indigestion over the past several weeks or so, for which he had taken Mylanta with some relief. He denies heartburn and dysphagia. He does not take any NSAIDs. He just started taking Protonix BID a week ago but it is not relieving this pain. He had a CT scan in the ER today that shows:    1. Surgical changes are again seen following Whipple procedure. Overall mildly  increased perivascular recurrence in the superior retroperitoneum. Left renal  vein occlusion and upper abdominal collateral vessels are again noted.      2. Round sclerotic lesion in the L5 vertebral body suspicious for metastasis.     3. Several scattered bilateral peripheral lower lung nodules measuring 1 to 2 mm  may represent infection, inflammation, or metastasis. PMH:  Past Medical History:   Diagnosis Date    Aneurysm ([facility] [address] 2008    CEREBRAL    Arrhythmia     Previous a.fib, ablation, NSR now; NO LONGER FOLLOWED BY CARDIOLOGIST.  Autoimmune disease ([facility] [address]     SJOGREN'S    Cancer ([facility] [address]     COMMON BILE DUCT, ADENOCARCINOMA    Diabetes ([facility] [address]     Family history of skin cancer     Hypertension     Sepsis ([facility] [address] 2017    STENTING TO BILE DUCT    Status post chemotherapy     Stroke [facility]) 2008    brain aneurysm - no deficits    Sun-damaged skin     Sunburn, blistering        PSH:  Past Surgical History:   Procedure Laterality Date    HX CHOLECYSTECTOMY      HX GI      COLONOSCOPY, POLYPS (BENIGN)    HX GI  10/06/2017    Whipple [facility]     HX GI  2020    WHIPPLE REVISION    HX HEART CATHETERIZATION  2005    Ablation     HX ORTHOPAEDIC  2000    Spinal fusion W/ HARDWARE    HX OTHER SURGICAL  11/07/2017    [place] [facility], Dr. Colby Billingsley  2017    PORTACATH - then removed    NEUROLOGICAL PROCEDURE UNLISTED  2005    [place] [employer] hole washout from cerebral hemorrhage       Allergies: Allergies   Allergen Reactions    Shellfish Derived Anaphylaxis     Soft shell crabs    Morphine Nausea and Vomiting    Pcn [Penicillins] Other (comments)     Pt stated \"always been told PCN\"  Pt tolerated other cephalosporins in the past       Home Medications:  Prior to Admission Medications   Prescriptions Last Dose Informant Patient Reported? Taking? Creon 36,000-114,000- 180,000 unit cpDR capsule   No No   Sig: Take 4 Caps by mouth three (3) times daily (with meals). 2-3 caps each meal and 1 cap for snacks (see additional order)   HYDROmorphone (DILAUDID) 4 mg tablet   No No   Sig: Take 2 Tabs by mouth every three (3) hours as needed for Pain for up to 15 days.  Max Daily Amount: 64 mg. HYDROmorphone (DILAUDID) 8 mg tablet   Yes No   Sig: Take 8 mg by mouth every four (4) hours as needed for Pain. acetaminophen (TYLENOL) 325 mg tablet   No No   Sig: Take 2 Tabs by mouth every four (4) hours as needed for Pain or Fever (Over the counter medication). alpha-D-galactosidase (BEANO PO)  Self Yes No   Sig: Take 1 Tab by mouth two (2) times a day. aluminum & magnesium hydroxide-simethicone (Maalox Maximum Strength) 400-400-40 mg/5 mL suspension   Yes No   Sig: Take 10 mL by mouth every six (6) hours as needed for Indigestion. cetirizine (ZYRTEC) 10 mg tablet  Self Yes No   Sig: Take 10 mg by mouth nightly. cyanocobalamin (VITAMIN B12) 1,000 mcg/mL injection   No No   Sig: INJECT CONTENTS OF ONE VIAL INTRAMUSCULARLY EVERY OTHER WEEK   Patient taking differently: INJECT CONTENTS OF ONE VIAL INTRAMUSCULARLY EVERY OTHER WEEK (FIRST AND 15TH OF EACH MONTH)   dexAMETHasone (DECADRON) 2 mg tablet   No No   Sig: Take 1 Tab by mouth two (2) times daily (with meals). dutasteride (AVODART) 0.5 mg capsule   No No   Sig: Take 1 Cap by mouth daily. empagliflozin (Jardiance) 25 mg tablet   No No   Sig: Take 1 Tab by mouth nightly. finasteride (PROSCAR) 5 mg tablet   Yes No   Sig: TAKE 1 TABLET BY MOUTH EVERY DAY   gabapentin (NEURONTIN) 100 mg capsule   No No   Sig: Take 3 capsules by mouth twice a day. lidocaine (LIDODERM) 5 %   No No   Si Patch by TransDERmal route every twenty-four (24) hours. Apply patch to the affected area for 12 hours a day and remove for 12 hours a day. Patient taking differently: 1 Patch by TransDERmal route daily as needed. Apply patch to the affected area for 12 hours a day and remove for 12 hours a day. lipase-protease-amylase (Creon) 36,000-114,000- 180,000 unit cpDR capsule   Yes No   Sig: Take 1 Cap by mouth as needed (for snacks).  2-3 caps each meal and 1 cap for snacks (see additional order)   loperamide (IMODIUM) 2 mg capsule   Yes No   Sig: Take 2-4 mg by mouth as needed for Diarrhea. Give 4 mg after first loose stool. Give an additional 2 mg after each loose stool as needed up to a maximum of 8 doses (16 mg/day). naloxone (NARCAN) 4 mg/actuation nasal spray   No No   Sig: Use 1 spray intranasally, then discard. Repeat with new spray every 2 min as needed for opioid overdose symptoms, alternating nostrils. oxyCODONE ER (OxyCONTIN) 20 mg ER tablet   No No   Sig: Take 1 Tab by mouth every eight (8) hours for 15 days. Max Daily Amount: 60 mg.   oxyCODONE ER (OxyCONTIN) 20 mg ER tablet   Yes No   Sig: Take 20 mg by mouth every twelve (12) hours. pantoprazole (PROTONIX) 40 mg tablet   No No   Sig: Take 1 Tab by mouth daily. ramipriL (ALTACE) 5 mg capsule   No No   Sig: Take 1 Cap by mouth daily. sildenafil citrate (VIAGRA) 50 mg tablet   No No   Sig: Take 1 Tab by mouth as needed (ED).   tamsulosin (FLOMAX) 0.4 mg capsule   No No   Sig: TAKE ONE CAPSULE BY MOUTH EVERY EVENING   traMADoL (ULTRAM) 50 mg tablet   Yes No   Sig: Take 50 mg by mouth every six (6) hours as needed for Pain.   vitamin A (AQUASOL A) 10,000 unit capsule   No No   Sig: Take 1 Cap by mouth daily. vitamin E (AQUA GEMS) 400 unit capsule   No No   Sig: Take 1 Cap by mouth daily. Facility-Administered Medications: None       Hospital Medications:  Current Facility-Administered Medications   Medication Dose Route Frequency    mylanta/viscous lidocaine (GI COCKTAIL)  40 mL Oral ONCE    pantoprazole (PROTONIX) 40 mg in 0.9% sodium chloride 10 mL injection  40 mg IntraVENous Q12H     Current Outpatient Medications   Medication Sig    HYDROmorphone (DILAUDID) 4 mg tablet Take 2 Tabs by mouth every three (3) hours as needed for Pain for up to 15 days. Max Daily Amount: 64 mg.    HYDROmorphone (DILAUDID) 8 mg tablet Take 8 mg by mouth every four (4) hours as needed for Pain.  oxyCODONE ER (OxyCONTIN) 20 mg ER tablet Take 20 mg by mouth every twelve (12) hours.     traMADoL (ULTRAM) 50 mg tablet Take 50 mg by mouth every six (6) hours as needed for Pain.  oxyCODONE ER (OxyCONTIN) 20 mg ER tablet Take 1 Tab by mouth every eight (8) hours for 15 days. Max Daily Amount: 60 mg.    dutasteride (AVODART) 0.5 mg capsule Take 1 Cap by mouth daily.  Creon 36,000-114,000- 180,000 unit cpDR capsule Take 4 Caps by mouth three (3) times daily (with meals). 2-3 caps each meal and 1 cap for snacks (see additional order)    dexAMETHasone (DECADRON) 2 mg tablet Take 1 Tab by mouth two (2) times daily (with meals).  pantoprazole (PROTONIX) 40 mg tablet Take 1 Tab by mouth daily.  naloxone (NARCAN) 4 mg/actuation nasal spray Use 1 spray intranasally, then discard. Repeat with new spray every 2 min as needed for opioid overdose symptoms, alternating nostrils.  aluminum & magnesium hydroxide-simethicone (Maalox Maximum Strength) 400-400-40 mg/5 mL suspension Take 10 mL by mouth every six (6) hours as needed for Indigestion.  vitamin A (AQUASOL A) 10,000 unit capsule Take 1 Cap by mouth daily.  vitamin E (AQUA GEMS) 400 unit capsule Take 1 Cap by mouth daily.  empagliflozin (Jardiance) 25 mg tablet Take 1 Tab by mouth nightly.  gabapentin (NEURONTIN) 100 mg capsule Take 3 capsules by mouth twice a day.  cyanocobalamin (VITAMIN B12) 1,000 mcg/mL injection INJECT CONTENTS OF ONE VIAL INTRAMUSCULARLY EVERY OTHER WEEK (Patient taking differently: INJECT CONTENTS OF ONE VIAL INTRAMUSCULARLY EVERY OTHER WEEK (FIRST AND 15TH OF EACH MONTH))    tamsulosin (FLOMAX) 0.4 mg capsule TAKE ONE CAPSULE BY MOUTH EVERY EVENING    ramipriL (ALTACE) 5 mg capsule Take 1 Cap by mouth daily.  lidocaine (LIDODERM) 5 % 1 Patch by TransDERmal route every twenty-four (24) hours. Apply patch to the affected area for 12 hours a day and remove for 12 hours a day. (Patient taking differently: 1 Patch by TransDERmal route daily as needed.  Apply patch to the affected area for 12 hours a day and remove for 12 hours a day.)    loperamide (IMODIUM) 2 mg capsule Take 2-4 mg by mouth as needed for Diarrhea. Give 4 mg after first loose stool. Give an additional 2 mg after each loose stool as needed up to a maximum of 8 doses (16 mg/day).  lipase-protease-amylase (Creon) 36,000-114,000- 180,000 unit cpDR capsule Take 1 Cap by mouth as needed (for snacks). 2-3 caps each meal and 1 cap for snacks (see additional order)    finasteride (PROSCAR) 5 mg tablet TAKE 1 TABLET BY MOUTH EVERY DAY    acetaminophen (TYLENOL) 325 mg tablet Take 2 Tabs by mouth every four (4) hours as needed for Pain or Fever (Over the counter medication).  sildenafil citrate (VIAGRA) 50 mg tablet Take 1 Tab by mouth as needed (ED).  alpha-D-galactosidase (BEANO PO) Take 1 Tab by mouth two (2) times a day.  cetirizine (ZYRTEC) 10 mg tablet Take 10 mg by mouth nightly.        Social History:  Social History     Tobacco Use    Smoking status: Never Smoker    Smokeless tobacco: Never Used   Substance Use Topics    Alcohol use: Never     Frequency: Never     Comment: very rare       Family History:  Family History   Problem Relation Age of Onset    Cancer Father         Breast and Colon, MELANOMA    Hypertension Father     Cancer Mother         LEUKEMIA    No Known Problems Sister     Atrial Fibrillation Brother     No Known Problems Sister     No Known Problems Son     No Known Problems Son     Anesth Problems Neg Hx        Review of Systems:    Constitutional: negative fever, negative chills, negative weight loss  Eyes:   negative visual changes  ENT:   negative sore throat, tongue or lip swelling  Respiratory:  negative cough, negative dyspnea  Cards:  negative for chest pain, palpitations, lower extremity edema  GI:   See HPI  :  negative for frequency, dysuria  Integument:  negative for rash and pruritus  Heme:  negative for easy bruising and gum/nose bleeding  Musculoskel: negative for myalgias,  back pain and muscle weakness  Neuro: negative for headaches, dizziness, vertigo  Psych:  negative for feelings of anxiety, depression      Objective:     Patient Vitals for the past 8 hrs:   BP Temp Pulse Resp SpO2   12/29/20 1112 104/71 98.6 °F (37 °C) 78 20 98 %     No intake/output data recorded. No intake/output data recorded. PHYSICAL EXAM:  General appearance: cooperative, in distress / pain  Skin: Extremities and face reveal no rashes, pallor, jaundice  HEENT: Sclerae anicteric. Extra-occular muscles are intact. Cardiovascular: Regular rate and rhythm. Respiratory: Clear breath sounds with no wheezes, rales, or rhonchi. GI: Abdomen nondistended, soft, and moderate ttp with even light palpation   Rectal: Deferred   Musculoskeletal: No edema of the lower legs. Neurological: Gross memory appears intact. Patient is alert and oriented. Psychiatric: Mood appears appropriate with good judgement. No anxiety or agitation. Data Review     Recent Labs     12/29/20  1119   WBC 6.8   HGB 13.6   HCT 40.8   *     Recent Labs     12/29/20  1119   *   K 3.9   CL 97   CO2 32   BUN 16   CREA 0.73   GLU 99   CA 8.9     Recent Labs     12/29/20  1119   *   TP 6.4   ALB 3.5   GLOB 2.9   AML 16*   LPSE 42*     No results for input(s): INR, PTP, APTT, INREXT in the last 72 hours.      Imaging studies reviewed    Assessment / Plan :     Epigastric pain, acute  - DDx includes gastritis, PUD, anastomotic ulcer  - IV PPI BID  - GI Cocktail   - Clear liquids  - Plan for EGD 12/30 at 0730 with Dr. Wilman Tomas    Extrahepatic cholangiocarcinoma: T3 N1 (1 of 12 LN +ve)  - May have spine and lung mets on recent CT, and mass has enlarged  - Oncology consult to partner of Dr. Neida Molina  - Was to have OP MRI at [address] on 12/30   - Pain management per hospitalist    - May need to consider IR celiac plexus block vs. OP as already scheduled with Dr. Coreas Overall Problem List:   Active Problems:    Intractable abdominal pain (12/29/2020)

## 2020-12-29 NOTE — ED PROVIDER NOTES
The history is provided by the patient and the spouse. Abdominal Pain   This is a recurrent problem. The current episode started more than 2 days ago. The problem occurs constantly. The problem has been gradually worsening. The pain is associated with vomiting. The pain is located in the periumbilical region. The quality of the pain is sharp and shooting. The pain is at a severity of 10/10. The pain is severe. Associated symptoms include anorexia, nausea, vomiting and back pain. Pertinent negatives include no fever, no constipation, no dysuria, no hematuria, no headaches and no chest pain. The pain is worsened by palpation. The pain is relieved by nothing. Past workup includes CT scan, surgery. His past medical history is significant for cancer (cholangiocarcinoma). The patient's surgical history includes colectomy. hernia repair earlier this month. Past Medical History:   Diagnosis Date    Aneurysm [facility]) 2008    CEREBRAL    Arrhythmia     Previous a.fib, ablation, NSR now; NO LONGER FOLLOWED BY CARDIOLOGIST.     Autoimmune disease ([address]     SJOGREN'S    Cancer ([address]     COMMON BILE DUCT, ADENOCARCINOMA    Diabetes ([address]     Family history of skin cancer     Hypertension     Sepsis ([address] 2017    STENTING TO BILE DUCT    Status post chemotherapy     Stroke [facility]) 2008    brain aneurysm - no deficits    Sun-damaged skin     Sunburn, blistering        Past Surgical History:   Procedure Laterality Date    HX CHOLECYSTECTOMY      HX GI      COLONOSCOPY, POLYPS (BENIGN)    HX GI  10/06/2017    [facility]     HX GI  2020    WHIPPLE REVISION    HX HEART CATHETERIZATION  2005    Ablation     HX ORTHOPAEDIC  2000    Spinal fusion W/ HARDWARE    HX OTHER SURGICAL  11/07/2017    [place], Dr. Eugenia Wilder  2017    PORTACATH - then removed    NEUROLOGICAL PROCEDURE UNLISTED  2005    [place] hole washout from cerebral hemorrhage         Family History:   Problem Relation Age of Onset    Cancer Father         Breast and Colon, MELANOMA    Hypertension Father     Cancer Mother         LEUKEMIA    No Known Problems Sister     Atrial Fibrillation Brother     No Known Problems Sister     No Known Problems Son     No Known Problems Son     Anesth Problems Neg Hx        Social History     Socioeconomic History    Marital status:      Spouse name: Not on file    Number of children: Not on file    Years of education: Not on file    Highest education level: Not on file   Occupational History    Not on file   Social Needs    Financial resource strain: Not on file    Food insecurity     Worry: Not on file     Inability: Not on file   [employer] needs     Medical: Not on file     Non-medical: Not on file   Tobacco Use    Smoking status: Never Smoker    Smokeless tobacco: Never Used   Substance and Sexual Activity    Alcohol use: Never     Frequency: Never     Comment: very rare    Drug use: No    Sexual activity: Yes     Partners: Female   Lifestyle    Physical activity     Days per week: Not on file     Minutes per session: Not on file    Stress: Not on file   Relationships    Social connections     Talks on phone: Not on file     Gets together: Not on file     Attends Buddhism service: Not on file     Active member of club or organization: Not on file     Attends meetings of clubs or organizations: Not on file     Relationship status: Not on file    Intimate partner violence     Fear of current or ex partner: Not on file     Emotionally abused: Not on file     Physically abused: Not on file     Forced sexual activity: Not on file   Other Topics Concern    Not on file   Social History Narrative    Not on file         ALLERGIES: Shellfish derived, Morphine, and Pcn [penicillins]    Review of Systems   Constitutional: Negative for chills and fever. Respiratory: Negative for cough and shortness of breath. Cardiovascular: Negative for chest pain. Gastrointestinal: Positive for abdominal pain, anorexia, nausea and vomiting. Negative for constipation. Genitourinary: Negative for dysuria and hematuria. Musculoskeletal: Positive for back pain. Skin: Negative for rash. Neurological: Negative for syncope and headaches. All other systems reviewed and are negative. Vitals:    12/29/20 1112   BP: 104/71   Pulse: 78   Resp: 20   Temp: 98.6 °F (37 °C)   SpO2: 98%            Physical Exam  Vitals signs reviewed. Constitutional:       General: He is in acute distress (holding abdomen, writhing from pain). Appearance: He is well-developed. HENT:      Head: Normocephalic and atraumatic. Eyes:      Conjunctiva/sclera: Conjunctivae normal.   Neck:      Musculoskeletal: Normal range of motion and neck supple. Cardiovascular:      Rate and Rhythm: Normal rate and regular rhythm. Pulmonary:      Effort: Pulmonary effort is normal. No respiratory distress. Abdominal:      Palpations: Abdomen is soft. Tenderness: There is abdominal tenderness in the periumbilical area. There is no guarding or rebound. Musculoskeletal: Normal range of motion. Skin:     General: Skin is warm and dry. Neurological:      Mental Status: He is alert and oriented to person, place, and time. Motor: No abnormal muscle tone. Gait: Gait normal.          MDM       Procedures      A/P: Is a 44-year-old male with history of cholangiocarcinoma, followed by Dr. Irasema Sullivan, here with recurrent periumbilical abdominal pain, similar to previous exacerbations. Slept to eat in the past 3 days secondary to pain. Plan obtain CT abdomen pelvis to look for any complication or progression. Disposition based on results and clinical course. Perfect Serve Consult for Admission  2:38 PM    ED Room Number: R38/R38  Patient Name and age:  Shelby Cardoza. 60 y.o.  male  Working Diagnosis:   1.  Intractable periumbilical abdominal pain        COVID-19 Suspicion: no  Sepsis present:  no  Reassessment needed: no  Code Status:  Full Code  Readmission: no  Isolation Requirements:  no  Recommended Level of Care:  med/surg  Department:[facility] Adult ED - [phone]  Other:  GI consulting, h/o cholangiocarcinoma.

## 2020-12-29 NOTE — ED TRIAGE NOTES
Arrives ambulatory for c/o mervin-umbilical abdominal pain x 3 days that worsened last night. Denies N/V/D or fevers. Took prescribed narcotics without relief.

## 2020-12-30 ENCOUNTER — APPOINTMENT (OUTPATIENT)
Dept: MRI IMAGING | Age: 65
End: 2020-12-30
Attending: INTERNAL MEDICINE
Payer: COMMERCIAL

## 2020-12-30 ENCOUNTER — ANESTHESIA EVENT (OUTPATIENT)
Dept: ENDOSCOPY | Age: 65
End: 2020-12-30
Payer: COMMERCIAL

## 2020-12-30 ENCOUNTER — APPOINTMENT (OUTPATIENT)
Dept: CT IMAGING | Age: 65
End: 2020-12-30
Attending: HOSPITALIST
Payer: COMMERCIAL

## 2020-12-30 ENCOUNTER — ANESTHESIA (OUTPATIENT)
Dept: ENDOSCOPY | Age: 65
End: 2020-12-30
Payer: COMMERCIAL

## 2020-12-30 LAB
ALBUMIN SERPL-MCNC: 3.1 G/DL (ref 3.5–5)
ALBUMIN/GLOB SERPL: 1.2 {RATIO} (ref 1.1–2.2)
ALP SERPL-CCNC: 151 U/L (ref 45–117)
ALT SERPL-CCNC: 51 U/L (ref 12–78)
ANION GAP SERPL CALC-SCNC: 4 MMOL/L (ref 5–15)
AST SERPL-CCNC: 11 U/L (ref 15–37)
BASOPHILS # BLD: 0 K/UL (ref 0–0.1)
BASOPHILS NFR BLD: 1 % (ref 0–1)
BILIRUB SERPL-MCNC: 0.9 MG/DL (ref 0.2–1)
BUN SERPL-MCNC: 12 MG/DL (ref 6–20)
BUN/CREAT SERPL: 16 (ref 12–20)
CALCIUM SERPL-MCNC: 8.5 MG/DL (ref 8.5–10.1)
CHLORIDE SERPL-SCNC: 101 MMOL/L (ref 97–108)
CO2 SERPL-SCNC: 31 MMOL/L (ref 21–32)
CREAT SERPL-MCNC: 0.77 MG/DL (ref 0.7–1.3)
DIFFERENTIAL METHOD BLD: ABNORMAL
EOSINOPHIL # BLD: 0.1 K/UL (ref 0–0.4)
EOSINOPHIL NFR BLD: 4 % (ref 0–7)
ERYTHROCYTE [DISTWIDTH] IN BLOOD BY AUTOMATED COUNT: 12.9 % (ref 11.5–14.5)
GLOBULIN SER CALC-MCNC: 2.6 G/DL (ref 2–4)
GLUCOSE SERPL-MCNC: 122 MG/DL (ref 65–100)
HCT VFR BLD AUTO: 37.5 % (ref 36.6–50.3)
HGB BLD-MCNC: 12.5 G/DL (ref 12.1–17)
IMM GRANULOCYTES # BLD AUTO: 0 K/UL (ref 0–0.04)
IMM GRANULOCYTES NFR BLD AUTO: 1 % (ref 0–0.5)
LYMPHOCYTES # BLD: 0.3 K/UL (ref 0.8–3.5)
LYMPHOCYTES NFR BLD: 8 % (ref 12–49)
MCH RBC QN AUTO: 30.8 PG (ref 26–34)
MCHC RBC AUTO-ENTMCNC: 33.3 G/DL (ref 30–36.5)
MCV RBC AUTO: 92.4 FL (ref 80–99)
MONOCYTES # BLD: 0.5 K/UL (ref 0–1)
MONOCYTES NFR BLD: 14 % (ref 5–13)
NEUTS SEG # BLD: 2.8 K/UL (ref 1.8–8)
NEUTS SEG NFR BLD: 72 % (ref 32–75)
NRBC # BLD: 0 K/UL (ref 0–0.01)
NRBC BLD-RTO: 0 PER 100 WBC
PLATELET # BLD AUTO: 106 K/UL (ref 150–400)
PMV BLD AUTO: 10.7 FL (ref 8.9–12.9)
POTASSIUM SERPL-SCNC: 3.8 MMOL/L (ref 3.5–5.1)
PROT SERPL-MCNC: 5.7 G/DL (ref 6.4–8.2)
RBC # BLD AUTO: 4.06 M/UL (ref 4.1–5.7)
RBC MORPH BLD: ABNORMAL
SODIUM SERPL-SCNC: 136 MMOL/L (ref 136–145)
WBC # BLD AUTO: 3.7 K/UL (ref 4.1–11.1)

## 2020-12-30 PROCEDURE — 74011000250 HC RX REV CODE- 250: Performed by: NURSE ANESTHETIST, CERTIFIED REGISTERED

## 2020-12-30 PROCEDURE — 74011250636 HC RX REV CODE- 250/636: Performed by: PHYSICIAN ASSISTANT

## 2020-12-30 PROCEDURE — 85025 COMPLETE CBC W/AUTO DIFF WBC: CPT

## 2020-12-30 PROCEDURE — 80053 COMPREHEN METABOLIC PANEL: CPT

## 2020-12-30 PROCEDURE — 72158 MRI LUMBAR SPINE W/O & W/DYE: CPT

## 2020-12-30 PROCEDURE — 99254 IP/OBS CNSLTJ NEW/EST MOD 60: CPT | Performed by: NURSE PRACTITIONER

## 2020-12-30 PROCEDURE — 76060000031 HC ANESTHESIA FIRST 0.5 HR: Performed by: INTERNAL MEDICINE

## 2020-12-30 PROCEDURE — 74011250636 HC RX REV CODE- 250/636: Performed by: HOSPITALIST

## 2020-12-30 PROCEDURE — 99219 PR INITIAL OBSERVATION CARE/DAY 50 MINUTES: CPT | Performed by: NURSE PRACTITIONER

## 2020-12-30 PROCEDURE — 74011250637 HC RX REV CODE- 250/637: Performed by: HOSPITALIST

## 2020-12-30 PROCEDURE — 99233 SBSQ HOSP IP/OBS HIGH 50: CPT | Performed by: INTERNAL MEDICINE

## 2020-12-30 PROCEDURE — 36415 COLL VENOUS BLD VENIPUNCTURE: CPT

## 2020-12-30 PROCEDURE — 74011250637 HC RX REV CODE- 250/637: Performed by: INTERNAL MEDICINE

## 2020-12-30 PROCEDURE — 74011250636 HC RX REV CODE- 250/636: Performed by: INTERNAL MEDICINE

## 2020-12-30 PROCEDURE — 99218 HC RM OBSERVATION: CPT

## 2020-12-30 PROCEDURE — 74011000250 HC RX REV CODE- 250: Performed by: PHYSICIAN ASSISTANT

## 2020-12-30 PROCEDURE — C9113 INJ PANTOPRAZOLE SODIUM, VIA: HCPCS | Performed by: PHYSICIAN ASSISTANT

## 2020-12-30 PROCEDURE — 96376 TX/PRO/DX INJ SAME DRUG ADON: CPT

## 2020-12-30 PROCEDURE — 76040000019: Performed by: INTERNAL MEDICINE

## 2020-12-30 PROCEDURE — 74011250636 HC RX REV CODE- 250/636: Performed by: NURSE ANESTHETIST, CERTIFIED REGISTERED

## 2020-12-30 PROCEDURE — 77012 CT SCAN FOR NEEDLE BIOPSY: CPT

## 2020-12-30 PROCEDURE — 77030014115

## 2020-12-30 PROCEDURE — A9575 INJ GADOTERATE MEGLUMI 0.1ML: HCPCS | Performed by: HOSPITALIST

## 2020-12-30 PROCEDURE — 2709999900 HC NON-CHARGEABLE SUPPLY: Performed by: INTERNAL MEDICINE

## 2020-12-30 PROCEDURE — 74011000250 HC RX REV CODE- 250: Performed by: STUDENT IN AN ORGANIZED HEALTH CARE EDUCATION/TRAINING PROGRAM

## 2020-12-30 PROCEDURE — 74011250636 HC RX REV CODE- 250/636: Performed by: STUDENT IN AN ORGANIZED HEALTH CARE EDUCATION/TRAINING PROGRAM

## 2020-12-30 PROCEDURE — 74011000258 HC RX REV CODE- 258: Performed by: HOSPITALIST

## 2020-12-30 PROCEDURE — 74011250636 HC RX REV CODE- 250/636: Performed by: NURSE PRACTITIONER

## 2020-12-30 PROCEDURE — 74011000636 HC RX REV CODE- 636

## 2020-12-30 RX ORDER — SODIUM CHLORIDE 9 MG/ML
50 INJECTION, SOLUTION INTRAVENOUS CONTINUOUS
Status: DISPENSED | OUTPATIENT
Start: 2020-12-30 | End: 2020-12-30

## 2020-12-30 RX ORDER — SODIUM CHLORIDE 9 MG/ML
25 INJECTION, SOLUTION INTRAVENOUS CONTINUOUS
Status: DISCONTINUED | OUTPATIENT
Start: 2020-12-30 | End: 2020-12-30 | Stop reason: HOSPADM

## 2020-12-30 RX ORDER — FLUMAZENIL 0.1 MG/ML
0.5 INJECTION INTRAVENOUS ONCE
Status: DISCONTINUED | OUTPATIENT
Start: 2020-12-30 | End: 2020-12-30 | Stop reason: HOSPADM

## 2020-12-30 RX ORDER — GADOTERATE MEGLUMINE 376.9 MG/ML
13 INJECTION INTRAVENOUS
Status: COMPLETED | OUTPATIENT
Start: 2020-12-30 | End: 2020-12-30

## 2020-12-30 RX ORDER — DEXAMETHASONE 1 MG/1
2 TABLET ORAL EVERY 12 HOURS
Status: DISCONTINUED | OUTPATIENT
Start: 2020-12-30 | End: 2020-12-31 | Stop reason: HOSPADM

## 2020-12-30 RX ORDER — HYDROMORPHONE HYDROCHLORIDE 1 MG/ML
6 INJECTION, SOLUTION INTRAMUSCULAR; INTRAVENOUS; SUBCUTANEOUS
Status: DISCONTINUED | OUTPATIENT
Start: 2020-12-30 | End: 2020-12-30

## 2020-12-30 RX ORDER — SODIUM CHLORIDE 9 MG/ML
INJECTION, SOLUTION INTRAVENOUS
Status: DISCONTINUED | OUTPATIENT
Start: 2020-12-30 | End: 2020-12-30 | Stop reason: HOSPADM

## 2020-12-30 RX ORDER — EPINEPHRINE 0.1 MG/ML
1 INJECTION INTRACARDIAC; INTRAVENOUS
Status: DISCONTINUED | OUTPATIENT
Start: 2020-12-30 | End: 2020-12-30 | Stop reason: HOSPADM

## 2020-12-30 RX ORDER — SODIUM CHLORIDE 0.9 % (FLUSH) 0.9 %
10 SYRINGE (ML) INJECTION
Status: COMPLETED | OUTPATIENT
Start: 2020-12-30 | End: 2020-12-30

## 2020-12-30 RX ORDER — GADOTERATE MEGLUMINE 376.9 MG/ML
13 INJECTION INTRAVENOUS
Status: DISCONTINUED | OUTPATIENT
Start: 2020-12-30 | End: 2020-12-30

## 2020-12-30 RX ORDER — PROPOFOL 10 MG/ML
INJECTION, EMULSION INTRAVENOUS AS NEEDED
Status: DISCONTINUED | OUTPATIENT
Start: 2020-12-30 | End: 2020-12-30 | Stop reason: HOSPADM

## 2020-12-30 RX ORDER — FENTANYL CITRATE 50 UG/ML
100 INJECTION, SOLUTION INTRAMUSCULAR; INTRAVENOUS
Status: DISCONTINUED | OUTPATIENT
Start: 2020-12-30 | End: 2020-12-30 | Stop reason: HOSPADM

## 2020-12-30 RX ORDER — DEXTROMETHORPHAN/PSEUDOEPHED 2.5-7.5/.8
1.2 DROPS ORAL
Status: DISCONTINUED | OUTPATIENT
Start: 2020-12-30 | End: 2020-12-30 | Stop reason: HOSPADM

## 2020-12-30 RX ORDER — LIDOCAINE HYDROCHLORIDE 20 MG/ML
INJECTION, SOLUTION EPIDURAL; INFILTRATION; INTRACAUDAL; PERINEURAL AS NEEDED
Status: DISCONTINUED | OUTPATIENT
Start: 2020-12-30 | End: 2020-12-30 | Stop reason: HOSPADM

## 2020-12-30 RX ORDER — MIDAZOLAM HYDROCHLORIDE 1 MG/ML
.25-1 INJECTION, SOLUTION INTRAMUSCULAR; INTRAVENOUS
Status: DISCONTINUED | OUTPATIENT
Start: 2020-12-30 | End: 2020-12-30 | Stop reason: HOSPADM

## 2020-12-30 RX ORDER — SODIUM CHLORIDE 0.9 % (FLUSH) 0.9 %
10 SYRINGE (ML) INJECTION
Status: DISPENSED | OUTPATIENT
Start: 2020-12-30 | End: 2020-12-30

## 2020-12-30 RX ORDER — FLUMAZENIL 0.1 MG/ML
0.2 INJECTION INTRAVENOUS
Status: DISCONTINUED | OUTPATIENT
Start: 2020-12-30 | End: 2020-12-30 | Stop reason: HOSPADM

## 2020-12-30 RX ORDER — BUPIVACAINE HYDROCHLORIDE 5 MG/ML
10 INJECTION, SOLUTION EPIDURAL; INTRACAUDAL
Status: COMPLETED | OUTPATIENT
Start: 2020-12-30 | End: 2020-12-30

## 2020-12-30 RX ORDER — FENTANYL CITRATE 50 UG/ML
200 INJECTION, SOLUTION INTRAMUSCULAR; INTRAVENOUS
Status: DISCONTINUED | OUTPATIENT
Start: 2020-12-30 | End: 2020-12-30 | Stop reason: HOSPADM

## 2020-12-30 RX ORDER — LIDOCAINE HYDROCHLORIDE 20 MG/ML
20 INJECTION, SOLUTION EPIDURAL; INFILTRATION; INTRACAUDAL; PERINEURAL
Status: DISCONTINUED | OUTPATIENT
Start: 2020-12-30 | End: 2020-12-30 | Stop reason: HOSPADM

## 2020-12-30 RX ORDER — NALOXONE HYDROCHLORIDE 0.4 MG/ML
0.4 INJECTION, SOLUTION INTRAMUSCULAR; INTRAVENOUS; SUBCUTANEOUS
Status: DISCONTINUED | OUTPATIENT
Start: 2020-12-30 | End: 2020-12-30 | Stop reason: HOSPADM

## 2020-12-30 RX ORDER — MIDAZOLAM HYDROCHLORIDE 1 MG/ML
10 INJECTION, SOLUTION INTRAMUSCULAR; INTRAVENOUS
Status: DISCONTINUED | OUTPATIENT
Start: 2020-12-30 | End: 2020-12-30 | Stop reason: HOSPADM

## 2020-12-30 RX ORDER — SODIUM CHLORIDE 0.9 % (FLUSH) 0.9 %
5-40 SYRINGE (ML) INJECTION EVERY 8 HOURS
Status: DISCONTINUED | OUTPATIENT
Start: 2020-12-30 | End: 2020-12-31 | Stop reason: HOSPADM

## 2020-12-30 RX ORDER — ATROPINE SULFATE 0.1 MG/ML
0.5 INJECTION INTRAVENOUS
Status: DISCONTINUED | OUTPATIENT
Start: 2020-12-30 | End: 2020-12-30 | Stop reason: HOSPADM

## 2020-12-30 RX ORDER — SODIUM CHLORIDE 0.9 % (FLUSH) 0.9 %
5-40 SYRINGE (ML) INJECTION AS NEEDED
Status: DISCONTINUED | OUTPATIENT
Start: 2020-12-30 | End: 2020-12-31 | Stop reason: HOSPADM

## 2020-12-30 RX ORDER — NALOXONE HYDROCHLORIDE 0.4 MG/ML
0.4 INJECTION, SOLUTION INTRAMUSCULAR; INTRAVENOUS; SUBCUTANEOUS AS NEEDED
Status: DISCONTINUED | OUTPATIENT
Start: 2020-12-30 | End: 2020-12-30 | Stop reason: HOSPADM

## 2020-12-30 RX ADMIN — SODIUM CHLORIDE 25 ML/HR: 900 INJECTION, SOLUTION INTRAVENOUS at 15:00

## 2020-12-30 RX ADMIN — SODIUM CHLORIDE: 900 INJECTION, SOLUTION INTRAVENOUS at 07:30

## 2020-12-30 RX ADMIN — HYDROMORPHONE HYDROCHLORIDE 2 MG: 2 INJECTION INTRAMUSCULAR; INTRAVENOUS; SUBCUTANEOUS at 05:45

## 2020-12-30 RX ADMIN — MIDAZOLAM HYDROCHLORIDE 0.5 MG: 1 INJECTION, SOLUTION INTRAMUSCULAR; INTRAVENOUS at 15:21

## 2020-12-30 RX ADMIN — OXYCODONE HYDROCHLORIDE 20 MG: 10 TABLET, FILM COATED, EXTENDED RELEASE ORAL at 10:02

## 2020-12-30 RX ADMIN — MIDAZOLAM HYDROCHLORIDE 0.5 MG: 1 INJECTION, SOLUTION INTRAMUSCULAR; INTRAVENOUS at 15:25

## 2020-12-30 RX ADMIN — GADOTERATE MEGLUMINE 13 ML: 376.9 INJECTION INTRAVENOUS at 04:23

## 2020-12-30 RX ADMIN — DEXTROSE AND SODIUM CHLORIDE 75 ML/HR: 5; 900 INJECTION, SOLUTION INTRAVENOUS at 13:50

## 2020-12-30 RX ADMIN — TAMSULOSIN HYDROCHLORIDE 0.4 MG: 0.4 CAPSULE ORAL at 18:02

## 2020-12-30 RX ADMIN — HYDROMORPHONE HYDROCHLORIDE 0.5 MG: 1 INJECTION, SOLUTION INTRAMUSCULAR; INTRAVENOUS; SUBCUTANEOUS at 15:25

## 2020-12-30 RX ADMIN — MIDAZOLAM HYDROCHLORIDE 0.5 MG: 1 INJECTION, SOLUTION INTRAMUSCULAR; INTRAVENOUS at 15:32

## 2020-12-30 RX ADMIN — Medication 10 ML: at 04:24

## 2020-12-30 RX ADMIN — Medication 10 ML: at 22:40

## 2020-12-30 RX ADMIN — HYDROMORPHONE HYDROCHLORIDE 2 MG: 2 INJECTION INTRAMUSCULAR; INTRAVENOUS; SUBCUTANEOUS at 13:48

## 2020-12-30 RX ADMIN — DOCUSATE SODIUM 100 MG: 100 CAPSULE, LIQUID FILLED ORAL at 10:03

## 2020-12-30 RX ADMIN — PROPOFOL 50 MG: 10 INJECTION, EMULSION INTRAVENOUS at 07:42

## 2020-12-30 RX ADMIN — OXYCODONE HYDROCHLORIDE 20 MG: 10 TABLET, FILM COATED, EXTENDED RELEASE ORAL at 22:40

## 2020-12-30 RX ADMIN — DOCUSATE SODIUM 100 MG: 100 CAPSULE, LIQUID FILLED ORAL at 18:03

## 2020-12-30 RX ADMIN — DEXAMETHASONE 2 MG: 1 TABLET ORAL at 22:39

## 2020-12-30 RX ADMIN — HYDROMORPHONE HYDROCHLORIDE 2 MG: 2 INJECTION INTRAMUSCULAR; INTRAVENOUS; SUBCUTANEOUS at 02:49

## 2020-12-30 RX ADMIN — PANTOPRAZOLE SODIUM 40 MG: 40 INJECTION, POWDER, FOR SOLUTION INTRAVENOUS at 22:40

## 2020-12-30 RX ADMIN — PANTOPRAZOLE SODIUM 40 MG: 40 INJECTION, POWDER, FOR SOLUTION INTRAVENOUS at 10:02

## 2020-12-30 RX ADMIN — HYDROMORPHONE HYDROCHLORIDE 2 MG: 2 INJECTION INTRAMUSCULAR; INTRAVENOUS; SUBCUTANEOUS at 10:02

## 2020-12-30 RX ADMIN — IOPAMIDOL 4 ML: 612 INJECTION, SOLUTION INTRAVENOUS at 15:29

## 2020-12-30 RX ADMIN — HYDROMORPHONE HYDROCHLORIDE 0.5 MG: 1 INJECTION, SOLUTION INTRAMUSCULAR; INTRAVENOUS; SUBCUTANEOUS at 15:35

## 2020-12-30 RX ADMIN — HYDROMORPHONE HYDROCHLORIDE 2 MG: 2 INJECTION INTRAMUSCULAR; INTRAVENOUS; SUBCUTANEOUS at 18:04

## 2020-12-30 RX ADMIN — MIDAZOLAM HYDROCHLORIDE 2 MG: 1 INJECTION, SOLUTION INTRAMUSCULAR; INTRAVENOUS at 15:06

## 2020-12-30 RX ADMIN — HYDROMORPHONE HYDROCHLORIDE 0.5 MG: 1 INJECTION, SOLUTION INTRAMUSCULAR; INTRAVENOUS; SUBCUTANEOUS at 15:21

## 2020-12-30 RX ADMIN — HYDROMORPHONE HYDROCHLORIDE 1 MG: 1 INJECTION, SOLUTION INTRAMUSCULAR; INTRAVENOUS; SUBCUTANEOUS at 15:15

## 2020-12-30 RX ADMIN — HYDROMORPHONE HYDROCHLORIDE 0.5 MG: 1 INJECTION, SOLUTION INTRAMUSCULAR; INTRAVENOUS; SUBCUTANEOUS at 16:02

## 2020-12-30 RX ADMIN — LIDOCAINE HYDROCHLORIDE 100 MG: 20 INJECTION, SOLUTION EPIDURAL; INFILTRATION; INTRACAUDAL; PERINEURAL at 07:37

## 2020-12-30 RX ADMIN — ALCOHOL 10 ML: 0.98 INJECTION INTRASPINAL at 15:34

## 2020-12-30 RX ADMIN — BUPIVACAINE HYDROCHLORIDE 10 ML: 5 INJECTION, SOLUTION EPIDURAL; INTRACAUDAL; PERINEURAL at 15:34

## 2020-12-30 RX ADMIN — PROPOFOL 100 MG: 10 INJECTION, EMULSION INTRAVENOUS at 07:37

## 2020-12-30 RX ADMIN — FENTANYL CITRATE 50 MCG: 50 INJECTION, SOLUTION INTRAMUSCULAR; INTRAVENOUS at 15:11

## 2020-12-30 RX ADMIN — MIDAZOLAM HYDROCHLORIDE 0.5 MG: 1 INJECTION, SOLUTION INTRAMUSCULAR; INTRAVENOUS at 15:30

## 2020-12-30 RX ADMIN — MIDAZOLAM HYDROCHLORIDE 1 MG: 1 INJECTION, SOLUTION INTRAMUSCULAR; INTRAVENOUS at 15:27

## 2020-12-30 RX ADMIN — MIDAZOLAM HYDROCHLORIDE 1 MG: 1 INJECTION, SOLUTION INTRAMUSCULAR; INTRAVENOUS at 15:23

## 2020-12-30 NOTE — H&P
[address] [facility] Adult  Hospitalist Group  History and Physical    Primary Care Provider: Alana Gonzalez DO  Date of Service:  12/29/2020    Subjective:     Fran Maddox is a 60 y.o. male with past medical history of cholangiocarcinoma s/p Whipple's, s/p chemotherapy, autoimmune disease, aneurysm-cerebral and other comorbidities came to the hospital with a chief complaint of abdominal pain. Patient states that she has severe abdominal pain-epigastric/periumbilical, rates it as 10 on 10 intensity for the last 3 days which is a constant pain, patient states that the narcotic pain medication regimen he takes at home-OxyContin and Dilaudid are not relieving this pain. He states that he feels nauseous. He denied any chest pain, shortness of breath, cough, dysuria. Patient states that he has chronic back pain but this abdominal pain is new for the last few days. Patient denied any constipation- has regular bowel movements. He was supposed to get an MRI of the abdomen this Thursday per his oncologist recommendations. Plan for celiac plexus blockade in early January 2021. In the emergency room, he received IV Dilaudid. Hospitalist was consulted for admission. CT abdomen was done. Review of Systems:    A comprehensive review of systems was negative except for that written in the History of Present Illness. Past Medical History:   Diagnosis Date    Aneurysm [facility]) 2008    CEREBRAL    Arrhythmia     Previous a.fib, ablation, NSR now; NO LONGER FOLLOWED BY CARDIOLOGIST.     Autoimmune disease ([address]     SJOGREN'S    Cancer ([address]     COMMON BILE DUCT, ADENOCARCINOMA    Diabetes ([address]     Family history of skin cancer     Hypertension     Sepsis ([address] 2017    STENTING TO BILE DUCT    Status post chemotherapy     Stroke [facility]) 2008    brain aneurysm - no deficits    Sun-damaged skin     Sunburn, blistering       Past Surgical History:   Procedure Laterality Date    HX CHOLECYSTECTOMY      HX GI      COLONOSCOPY, POLYPS (BENIGN)    HX GI  10/06/2017    Whipple Dr. Eva Wang [facility]     HX GI  2020    WHIPPLE REVISION    HX HEART CATHETERIZATION  2005    Ablation     HX ORTHOPAEDIC  2000    Spinal fusion W/ HARDWARE    HX OTHER SURGICAL  11/07/2017    [place] Cath, Dr. Petra Benavidez  2017    PORTACATH - then removed    NEUROLOGICAL PROCEDURE UNLISTED  2005    Kaitlin Muse hole washout from cerebral hemorrhage     Prior to Admission medications    Medication Sig Start Date End Date Taking? Authorizing Provider   HYDROmorphone (DILAUDID) 4 mg tablet Take 2 Tabs by mouth every three (3) hours as needed for Pain for up to 15 days. Max Daily Amount: 64 mg. 12/23/20 1/7/21  Negrita Brasher MD   HYDROmorphone (DILAUDID) 8 mg tablet Take 8 mg by mouth every four (4) hours as needed for Pain. Provider, Historical   oxyCODONE ER (OxyCONTIN) 20 mg ER tablet Take 20 mg by mouth every twelve (12) hours. Provider, Historical   traMADoL (ULTRAM) 50 mg tablet Take 50 mg by mouth every six (6) hours as needed for Pain. Provider, Historical   oxyCODONE ER (OxyCONTIN) 20 mg ER tablet Take 1 Tab by mouth every eight (8) hours for 15 days. Max Daily Amount: 60 mg. 12/21/20 1/5/21  Jason Canas MD   dutasteride (AVODART) 0.5 mg capsule Take 1 Cap by mouth daily. 12/21/20   Latoya OGDEN III, DO   Creon 36,000-114,000- 180,000 unit cpDR capsule Take 4 Caps by mouth three (3) times daily (with meals). 2-3 caps each meal and 1 cap for snacks (see additional order) 12/21/20   Evy Raya B III, DO   dexAMETHasone (DECADRON) 2 mg tablet Take 1 Tab by mouth two (2) times daily (with meals). 12/18/20   Jason Canas MD   pantoprazole (PROTONIX) 40 mg tablet Take 1 Tab by mouth daily. 12/18/20   Jason Canas MD   naloxone [facility]) 4 mg/actuation nasal spray Use 1 spray intranasally, then discard.  Repeat with new spray every 2 min as needed for opioid overdose symptoms, alternating nostrils. 12/11/20   Salma Barron MD   aluminum & magnesium hydroxide-simethicone (Maalox Maximum Strength) 400-400-40 mg/5 mL suspension Take 10 mL by mouth every six (6) hours as needed for Indigestion. Provider, Historical   vitamin A (AQUASOL A) 10,000 unit capsule Take 1 Cap by mouth daily. 10/26/20   Sergio Raya III, DO   vitamin E (AQUA GEMS) 400 unit capsule Take 1 Cap by mouth daily. 10/26/20   Alba Hardy III, DO   empagliflozin (Jardiance) 25 mg tablet Take 1 Tab by mouth nightly. 10/20/20   María Elena Raya III, DO   gabapentin (NEURONTIN) 100 mg capsule Take 3 capsules by mouth twice a day. 10/20/20   Silas Raya III, DO   cyanocobalamin (VITAMIN B12) 1,000 mcg/mL injection INJECT CONTENTS OF ONE VIAL INTRAMUSCULARLY EVERY OTHER WEEK  Patient taking differently: INJECT CONTENTS OF ONE VIAL INTRAMUSCULARLY EVERY OTHER WEEK (FIRST AND 15TH OF EACH MONTH) 10/20/20   Alba OGDEN III, DO   tamsulosin (FLOMAX) 0.4 mg capsule TAKE ONE CAPSULE BY MOUTH EVERY EVENING 10/20/20   Silas Raya III, DO   ramipriL (ALTACE) 5 mg capsule Take 1 Cap by mouth daily. 10/20/20   Silas Raya III, DO   lidocaine (LIDODERM) 5 % 1 Patch by TransDERmal route every twenty-four (24) hours. Apply patch to the affected area for 12 hours a day and remove for 12 hours a day. Patient taking differently: 1 Patch by TransDERmal route daily as needed. Apply patch to the affected area for 12 hours a day and remove for 12 hours a day. 10/15/20   Alba OGDEN III, DO   loperamide (IMODIUM) 2 mg capsule Take 2-4 mg by mouth as needed for Diarrhea. Give 4 mg after first loose stool. Give an additional 2 mg after each loose stool as needed up to a maximum of 8 doses (16 mg/day). Provider, Historical   lipase-protease-amylase (Creon) 36,000-114,000- 180,000 unit cpDR capsule Take 1 Cap by mouth as needed (for snacks).  2-3 caps each meal and 1 cap for snacks (see additional order)    Provider, Historical   finasteride (PROSCAR) 5 mg tablet TAKE 1 TABLET BY MOUTH EVERY DAY 4/13/20   Provider, Historical   acetaminophen (TYLENOL) 325 mg tablet Take 2 Tabs by mouth every four (4) hours as needed for Pain or Fever (Over the counter medication). 1/2/20   Zach Tomlinson NP   sildenafil citrate (VIAGRA) 50 mg tablet Take 1 Tab by mouth as needed (ED). 5/6/19   Librado Raya III, DO   alpha-D-galactosidase (BEANO PO) Take 1 Tab by mouth two (2) times a day. Other, MD Flynn   cetirizine (ZYRTEC) 10 mg tablet Take 10 mg by mouth nightly. Provider, Historical     Allergies   Allergen Reactions    Shellfish Derived Anaphylaxis     Soft shell crabs    Morphine Nausea and Vomiting    Pcn [Penicillins] Other (comments)     Pt stated \"always been told PCN\"  Pt tolerated other cephalosporins in the past      Family History   Problem Relation Age of Onset    Cancer Father         Breast and Colon, MELANOMA    Hypertension Father     Cancer Mother         LEUKEMIA    No Known Problems Sister     Atrial Fibrillation Brother     No Known Problems Sister     No Known Problems Son     No Known Problems Son     Anesth Problems Neg Hx         SOCIAL HISTORY:  Patient resides at Home. Patient ambulates with independently.    Smoking history: Does not smoke  Alcohol history: Does not drink alcohol        Objective:       Physical Exam:   Gen:  Well-developed, well-nourished, in distress due to pain  HEENT:    EOMI,anicteric, no pallor,hearing intact to voice, moist mucous membranes,sinus non tender  Neck:  Supple, without masses, thyroid non-tender  Resp:  No accessory muscle use, clear breath sounds without wheezes rales or rhonchi  Card:  No murmurs, normal S1, S2 without thrills, bruits or peripheral edema  Abd:  Soft, tender epigastric and periumbilical, non-distended, bowel sounds +  Musc:  No cyanosis or clubbing  Skin:  No rashes or ulcers, skin turgor is good  Neuro:  alert and oriented X 3, moves all extremities  Psych:  not anxious or agiatted         ECG: sinus rythym    Data Review: All diagnostic labs and studies have been reviewed. Ct Abd Pelv W Cont    Result Date: 12/29/2020  IMPRESSION: 1. Surgical changes are again seen following Whipple procedure. Overall mildly increased perivascular recurrence in the superior retroperitoneum. Left renal vein occlusion and upper abdominal collateral vessels are again noted. 2. Round sclerotic lesion in the L5 vertebral body suspicious for metastasis. 3. Several scattered bilateral peripheral lower lung nodules measuring 1 to 2 mm may represent infection, inflammation, or metastasis. Assessment:     Active Problems:    Intractable abdominal pain (12/29/2020)      #Intractable abdominal pain:  -Differential diagnosis gastric ulcers/peptic ulcer disease vs malignancy related  -CT abdomen showed some progression of disease. -GI consulted-plan for EGD tomorrow.  -Pain controlled-IV Dilaudid 2 mg every 4 hours as needed, resume OxyContin. #Extrahepatic cholangiocarcinoma:  -S/p Whipple surgery and chemotherapy  -Oncology consulted. -CT abdomen results reviewed. CT chest ordered.  -Discussed with Dr. Marlyne Boas as well. #Cancer related pain-pain management as above. Celiac plexus block per GI    #Nausea/vomiting-as needed Zofran. IV fluids.         Signed By: Capri Keller MD     December 29, 2020

## 2020-12-30 NOTE — PROGRESS NOTES
[address]  [address]av[address] Pradhan, [address]  [phone]                     GI PROGRESS NOTE    Patient Name: Mary Conde. : 1955      MRN: [id number]  Admit Date: 2020  Today's Date: 2020     EGD this morning by Dr. Yo Mo was normal.  His pain seems to be related to the enlarging tumor / cancer. I have discussed with IR this morning and the plan is for possible celiac plexus block today. Assessment / Plan :     Epigastric pain, acute - cancer pain  - EGD normal    - IV PPI BID  - KEEP NPO - IR to try for celiac plexus block today.  Discussed with Dr. Hellen Delarosa  - Pain management per hospitalist    Extrahepatic cholangiocarcinoma: T3 N1 (1 of 12 LN +ve)  - Possible lung mets on recent CT, and mass has enlarged  - MRI spine with a focal lesion in the L5 vertebral body favored to represent metastatic disease  - Appreciate oncology input - pt will f/u with Dr. Chavo Hughes upon discharge       Patient C/Wilton Forrester [address] Problem List:   Active Problems:    Intractable abdominal pain (2020)

## 2020-12-30 NOTE — CONSULTS
[facility]  Inpatient Consultation        Admit Date: 12/29/2020  Reason for Consultation: recurrent cholangiocarcinoma and recent inguinal hernia surgery    HPI:  Billie Ruiz is a 60 y.o. male w/ PMHx as outlined below whom we are asked to see in consultation by Dr. Beatrice Abdalla for the above complaint. Pt is known to Dr Aubrey Eisenmenger who did a pylorus-sparing Whipple procedure on him in 2017 for cholangiocarcinoma and laparoscopic revision of duodenojejunostomy in Sept of 2020. Most recently pt had a R inguinal hernia repair by Dr Aubrey Eisenmenger as well. He was admitted yesterday w/ epigastric pain, n/v and had an EGD this morning which did not show an ulcer but likely progressin of disease. He has not had any problems w/ his hernia repair; only mild pain.    No difficulty urinating or moving his bowels.           Patient Active Problem List    Diagnosis Date Noted    Intractable abdominal pain 12/29/2020    Right inguinal hernia 12/11/2020    Nausea & vomiting 09/02/2020    Abdominal pain 12/31/2019    Pancreatitis 11/17/2019    Acute pancreatitis 08/16/2018    Leukocytosis 08/16/2018    Controlled type 2 diabetes mellitus without complication, without long-term current use of insulin ([address] 07/30/2018    Vomiting 07/05/2018    Paroxysmal atrial fibrillation ([address] 10/26/2017    S/P ablation of atrial fibrillation 10/26/2017    Essential hypertension 10/26/2017    Cramps, muscle, general 10/26/2017    Low vitamin D level 10/26/2017    Low vitamin B12 level 10/26/2017    Cholangiocarcinoma determined by biopsy of biliary tract ([address] 10/06/2017    Cholangiocarcinoma of biliary tract ([address] 09/11/2017    Common bile duct (CBD) obstruction 07/25/2017    UTI (urinary tract infection) 07/25/2017    Obstructive jaundice 07/23/2017     Past Medical History:   Diagnosis Date    Aneurysm ([address] 2008    CEREBRAL    Arrhythmia     Previous a.fib, ablation, NSR now; NO LONGER FOLLOWED BY CARDIOLOGIST.  Autoimmune disease ([facility]     SJOGREN'S    Cancer ([facility]     COMMON BILE DUCT, ADENOCARCINOMA    Diabetes ([facility]     Family history of skin cancer     Hypertension     Sepsis ([facility] 2017    STENTING TO BILE DUCT    Status post chemotherapy     Stroke [facility]) 2008    brain aneurysm - no deficits    Sun-damaged skin     Sunburn, blistering       Past Surgical History:   Procedure Laterality Date    HX CHOLECYSTECTOMY      HX GI      COLONOSCOPY, POLYPS (BENIGN)    HX GI  10/06/2017    Viktor Wang [address] [place] [address]     HX GI  2020    WHIPPLE REVISION    HX HEART CATHETERIZATION  2005    Ablation     HX ORTHOPAEDIC  2000    Spinal fusion W/ HARDWARE    HX OTHER SURGICAL  11/07/2017    [place] Cath, Dr. Petra Benavidez  2017    PORTACATH - then removed    NEUROLOGICAL PROCEDURE UNLISTED  2005    Kaitlin Muse hole washout from cerebral hemorrhage      Social History     Tobacco Use    Smoking status: Never Smoker    Smokeless tobacco: Never Used   Substance Use Topics    Alcohol use: Never     Frequency: Never     Comment: very rare      Family History   Problem Relation Age of Onset    Cancer Father         Breast and Colon, MELANOMA    Hypertension Father     Cancer Mother         LEUKEMIA    No Known Problems Sister     Atrial Fibrillation Brother     No Known Problems Sister     No Known Problems Son     No Known Problems Son     Anesth Problems Neg Hx       Prior to Admission medications    Medication Sig Start Date End Date Taking? Authorizing Provider   HYDROmorphone (DILAUDID) 4 mg tablet Take 2 Tabs by mouth every three (3) hours as needed for Pain for up to 15 days. Max Daily Amount: 64 mg. 12/23/20 1/7/21  Negrita Brasher MD   HYDROmorphone (DILAUDID) 8 mg tablet Take 8 mg by mouth every four (4) hours as needed for Pain. Provider, Historical   oxyCODONE ER (OxyCONTIN) 20 mg ER tablet Take 20 mg by mouth every twelve (12) hours.     Provider, Historical traMADoL (ULTRAM) 50 mg tablet Take 50 mg by mouth every six (6) hours as needed for Pain. Provider, Historical   oxyCODONE ER (OxyCONTIN) 20 mg ER tablet Take 1 Tab by mouth every eight (8) hours for 15 days. Max Daily Amount: 60 mg. 12/21/20 1/5/21  Elizabeth Tan MD   dutasteride (AVODART) 0.5 mg capsule Take 1 Cap by mouth daily. 12/21/20   Rashida OGDEN III,    Creon 36,000-114,000- 180,000 unit cpDR capsule Take 4 Caps by mouth three (3) times daily (with meals). 2-3 caps each meal and 1 cap for snacks (see additional order) 12/21/20   Myles Raya III, DO   dexAMETHasone (DECADRON) 2 mg tablet Take 1 Tab by mouth two (2) times daily (with meals). 12/18/20   Elizabeth Tan MD   pantoprazole (PROTONIX) 40 mg tablet Take 1 Tab by mouth daily. 12/18/20   Elizabeth Tan MD   naloxone [facility]) 4 mg/actuation nasal spray Use 1 spray intranasally, then discard. Repeat with new spray every 2 min as needed for opioid overdose symptoms, alternating nostrils. 12/11/20   Margot Bruno MD   aluminum & magnesium hydroxide-simethicone (Maalox Maximum Strength) 400-400-40 mg/5 mL suspension Take 10 mL by mouth every six (6) hours as needed for Indigestion. Provider, Historical   vitamin A (AQUASOL A) 10,000 unit capsule Take 1 Cap by mouth daily. 10/26/20   Sandy Raya III, DO   vitamin E (AQUA GEMS) 400 unit capsule Take 1 Cap by mouth daily. 10/26/20   Rashida Montoya III,    empagliflozin (Jardiance) 25 mg tablet Take 1 Tab by mouth nightly. 10/20/20   Myles Raya III, DO   gabapentin (NEURONTIN) 100 mg capsule Take 3 capsules by mouth twice a day.  10/20/20   Silas Raya III, DO   cyanocobalamin (VITAMIN B12) 1,000 mcg/mL injection INJECT CONTENTS OF ONE VIAL INTRAMUSCULARLY EVERY OTHER WEEK  Patient taking differently: INJECT CONTENTS OF ONE VIAL INTRAMUSCULARLY EVERY OTHER WEEK (FIRST AND 15TH OF EACH MONTH) 10/20/20   Kylie Petit DO   tamsulosin (FLOMAX) 0.4 mg capsule TAKE ONE CAPSULE BY MOUTH EVERY EVENING 10/20/20   Silas Raya III, DO   ramipriL (ALTACE) 5 mg capsule Take 1 Cap by mouth daily. 10/20/20   Silas Raya III, DO   lidocaine (LIDODERM) 5 % 1 Patch by TransDERmal route every twenty-four (24) hours. Apply patch to the affected area for 12 hours a day and remove for 12 hours a day. Patient taking differently: 1 Patch by TransDERmal route daily as needed. Apply patch to the affected area for 12 hours a day and remove for 12 hours a day. 10/15/20   Moy OGDEN III, DO   loperamide (IMODIUM) 2 mg capsule Take 2-4 mg by mouth as needed for Diarrhea. Give 4 mg after first loose stool. Give an additional 2 mg after each loose stool as needed up to a maximum of 8 doses (16 mg/day). Provider, Historical   lipase-protease-amylase (Creon) 36,000-114,000- 180,000 unit cpDR capsule Take 1 Cap by mouth as needed (for snacks). 2-3 caps each meal and 1 cap for snacks (see additional order)    Provider, Historical   finasteride (PROSCAR) 5 mg tablet TAKE 1 TABLET BY MOUTH EVERY DAY 4/13/20   Provider, Historical   acetaminophen (TYLENOL) 325 mg tablet Take 2 Tabs by mouth every four (4) hours as needed for Pain or Fever (Over the counter medication). 1/2/20   Prince Jonathan NP   sildenafil citrate (VIAGRA) 50 mg tablet Take 1 Tab by mouth as needed (ED). 5/6/19   El Raya III, DO   alpha-D-galactosidase (BEANO PO) Take 1 Tab by mouth two (2) times a day. Other, MD Flynn   cetirizine (ZYRTEC) 10 mg tablet Take 10 mg by mouth nightly.     Provider, Historical     Current Facility-Administered Medications   Medication Dose Route Frequency    0.9% sodium chloride infusion  50 mL/hr IntraVENous CONTINUOUS    sodium chloride (NS) flush 5-40 mL  5-40 mL IntraVENous Q8H    sodium chloride (NS) flush 5-40 mL  5-40 mL IntraVENous PRN    0.9% sodium chloride infusion  50 mL/hr IntraVENous CONTINUOUS  sodium chloride (NS) flush 5-40 mL  5-40 mL IntraVENous Q8H    sodium chloride (NS) flush 5-40 mL  5-40 mL IntraVENous PRN    sodium chloride (NS) flush 10 mL  10 mL IntraVENous RAD ONCE    pantoprazole (PROTONIX) 40 mg in 0.9% sodium chloride 10 mL injection  40 mg IntraVENous Q12H    oxyCODONE ER (OxyCONTIN) tablet 20 mg  20 mg Oral Q12H    HYDROmorphone (DILAUDID) tablet 2 mg  2 mg Oral Q6H PRN    dextrose 5% and 0.9% NaCl infusion  75 mL/hr IntraVENous CONTINUOUS    tamsulosin (FLOMAX) capsule 0.4 mg  0.4 mg Oral QPM    docusate sodium (COLACE) capsule 100 mg  100 mg Oral BID    polyethylene glycol (MIRALAX) packet 17 g  17 g Oral DAILY    HYDROmorphone (PF) (DILAUDID) injection 2 mg  2 mg IntraVENous Q3H PRN     Allergies   Allergen Reactions    Shellfish Derived Anaphylaxis     Soft shell crabs    Morphine Nausea and Vomiting    Pcn [Penicillins] Other (comments)     Pt stated \"always been told PCN\"  Pt tolerated other cephalosporins in the past          Subjective:     Review of Systems:    A comprehensive review of systems was negative except for that written in the History of Present Illness. Objective:     Blood pressure 125/77, pulse 74, temperature 98.6 °F (37 °C), resp. rate 18, SpO2 98 %.   Temp (24hrs), Av.1 °F (36.7 °C), Min:97.6 °F (36.4 °C), Max:98.9 °F (37.2 °C)      Recent Labs     20  0254 20  1119   WBC 3.7* 6.8   HGB 12.5 13.6   HCT 37.5 40.8   * 129*     Recent Labs     20  0254 20  1119    132*   K 3.8 3.9    97   CO2 31 32   * 99   BUN 12 16   CREA 0.77 0.73   CA 8.5 8.9   ALB 3.1* 3.5   TBILI 0.9 1.4*   ALT 51 67     Recent Labs     20  1119   AML 16*   LPSE 42*         Intake/Output Summary (Last 24 hours) at 2020 1017  Last data filed at 2020 2330  Gross per 24 hour   Intake 505 ml   Output    Net 505 ml       _____________________  Physical Exam:     General:  Alert, cooperative, no distress, appears stated age. Eyes:   Sclera clear. Throat: Lips, mucosa, and tongue normal.   Neck: Supple, symmetrical, trachea midline. Lungs:   Clear to auscultation bilaterally. Heart:  Regular rate and rhythm. Abdomen:   Normal BS, flat, Soft, + epigastric tenderness  R groin w/ incision that has healed well; mildly tender   Extremities: Extremities normal, atraumatic, no cyanosis or edema. Skin: Skin color, texture, turgor normal. No rashes or lesions. Assessment:   Active Problems:    Intractable abdominal pain (12/29/2020)            Plan:     Pt is doing well after R inguinal hernia repair on 12/11/20  GI, oncology are following  For a celiac plexus block for pain mgmt      Thank you for allowing us to participate in the care of this patient. Total time spent with patient: 30 minutes. Signed By: Katerin Rosas NP     December 30, 2020        I have independently examined the patient and have reviewed the chart. I agree with the above plan. Patient admitted with bloating and new onset epigastric pain. He also has had worsening lumbar back pain that is requiring significant doses of narcotics. He had CT imaging that shows no acute findings but does suggest progression of his disease along the retroperitoneum as well as possible pulmonary mets and a suspicious L5 lesion. EGD this morning showed no findings to explain his symptoms. He has healed well from his right inguinal hernia surgery. Patient concerned as he continues to lose weight as well. He is eating well and denies any n/v since his reconstruction was converted to a eren-en Y. Agree with plan for repeat celiac plexus block to see if this helps his epigastric pain. I discussed the L5 lesion with Oncology (Dr. Jeffery Rey and Dr. Farrel Saint) as well as with Dr. Esmer Jordan of Rad Onc and Dr. Yessenia Curran of IR. Dr. Farrel Saint recommends IR for biopsy and attempted ablation/kyphoplasty if possible rather than up from palliative radiation. Dr. Brennen Garcia will evaluate this during his celiac plexus block. If he requires a port for systemic therapy in the future, I will plan to set him up for this as an outpatient. No other plans for surgical intervention at this time.      Palua Chapin MD  12/30/2020  1:37 PM

## 2020-12-30 NOTE — PROGRESS NOTES
[address] [facility]  Hospitalist Group                                                                                          Hospitalist Progress Note  Alyce Brunner, MD  Answering service: [phone] from in house phone        Date of Service:  2020  NAME:  Rey Mcdonald. :  1955  MRN:  [id number]      Admission Summary:   60 y.o. male with past medical history of cholangiocarcinoma s/p Whipple's, s/p chemotherapy, autoimmune disease, aneurysm-cerebral and other comorbidities came to the hospital with a chief complaint of abdominal pain    Interval history / Subjective:   Patient seen and examined . Wife at bedside    EGD was negative. Patient said abdominal pain is controlled with current regimen. Plan for celiac plexus block today     Assessment & Plan: ##Intractable abdominal pain:  #Extrahepatic cholangiocarcinoma:  -S/p Whipple surgery and chemotherapy  -due to  malignancy. EGD showed minimal gastritis in body, evidence of rou-en-y anatomy with healthy appearing anastomosis ,otherwise normal   -CT abdomen showed  progression of disease,CT chest showed new lung nodules  MRI L spine showed L5 lesion  -Pain controlled-IV Dilaudid 2 mg every 3 hours as needed,orall dilaudid prn,continue OxyContin.  -plan for celiac plexus block today  -Gen surg,oncology,palliative care following        #Cancer related pain-pain management as above.      #Nausea/vomiting-as needed Zofran. IV fluids.             Code status:full  DVT prophylaxis: heparin after procedure tomorrow    Care Plan discussed with: Patient/Family  Anticipated Disposition: Home w/Family  Anticipated Discharge: Greater than 48 hours     Hospital Problems  Date Reviewed: 2020          Codes Class Noted POA    Intractable abdominal pain ICD-10-CM: R10.9  ICD-9-CM: 789.00  2020 Unknown                Review of Systems:   Pertinent items are noted in HPI.        Vital Signs:    Last 24hrs VS reviewed since prior progress note. Most recent are:  Visit Vitals  /73   Pulse 72   Temp 98.6 °F (37 °C)   Resp 18   SpO2 97%         Intake/Output Summary (Last 24 hours) at 12/30/2020 1347  Last data filed at 12/29/2020 2330  Gross per 24 hour   Intake 505 ml   Output    Net 505 ml        Physical Examination:     I had a face to face encounter with this patient and independently examined them on 12/30/2020 as outlined below:          Constitutional:  No acute distress, cooperative, pleasant    ENT:  Oral mucosa moist, oropharynx benign. Resp:  CTA bilaterally. No wheezing/rhonchi/rales. No accessory muscle use   CV:  Regular rhythm, normal rate, no murmurs    GI:  Soft, non distended, epigastric tenderness, normoactive bowel sounds,    Musculoskeletal:  No LE edema. Lower back tenderness    Neurologic:  Moves all extremities. AAOx3     Psych:  Not anxious nor agitated. Data Review:    Review and/or order of clinical lab test  Review and/or order of tests in the radiology section of CPT  Review and/or order of tests in the medicine section of CPT      Labs:     Recent Labs     12/30/20  0254 12/29/20  1119   WBC 3.7* 6.8   HGB 12.5 13.6   HCT 37.5 40.8   * 129*     Recent Labs     12/30/20  0254 12/29/20  1119    132*   K 3.8 3.9    97   CO2 31 32   BUN 12 16   CREA 0.77 0.73   * 99   CA 8.5 8.9     Recent Labs     12/30/20  0254 12/29/20  1119   ALT 51 67   * 173*   TBILI 0.9 1.4*   TP 5.7* 6.4   ALB 3.1* 3.5   GLOB 2.6 2.9   AML  --  16*   LPSE  --  42*     No results for input(s): INR, PTP, APTT, INREXT in the last 72 hours. No results for input(s): FE, TIBC, PSAT, FERR in the last 72 hours. No results found for: FOL, RBCF   No results for input(s): PH, PCO2, PO2 in the last 72 hours. No results for input(s): CPK, CKNDX, TROIQ in the last 72 hours.     No lab exists for component: CPKMB  Lab Results   Component Value Date/Time    Cholesterol, total 81 09/02/2020 07:54 AM    HDL Cholesterol 49 09/02/2020 07:54 AM    LDL, calculated 22.6 09/02/2020 07:54 AM    Triglyceride 47 09/02/2020 07:54 AM    CHOL/HDL Ratio 1.7 09/02/2020 07:54 AM     Lab Results   Component Value Date/Time    Glucose (POC) 115 (H) 12/11/2020 01:17 PM    Glucose (POC) 101 (H) 12/11/2020 10:53 AM    Glucose (POC) 185 (H) 09/09/2020 06:00 PM    Glucose (POC) 129 (H) 09/09/2020 11:57 AM    Glucose (POC) 120 (H) 01/02/2020 11:47 AM     Lab Results   Component Value Date/Time    Color DARK YELLOW 09/20/2020 03:08 PM    Appearance CLEAR 09/20/2020 03:08 PM    Specific gravity >1.030 (H) 09/20/2020 03:08 PM    Specific gravity 1.027 08/16/2018 08:56 AM    pH (UA) 5.5 09/20/2020 03:08 PM    Protein 30 (A) 09/20/2020 03:08 PM    Glucose Negative 09/20/2020 03:08 PM    Ketone Negative 09/20/2020 03:08 PM    Bilirubin NEGATIVE  12/31/2019 08:12 AM    Urobilinogen 1.0 09/20/2020 03:08 PM    Nitrites Negative 09/20/2020 03:08 PM    Leukocyte Esterase TRACE (A) 09/20/2020 03:08 PM    Epithelial cells FEW 09/20/2020 03:08 PM    Bacteria Negative 09/20/2020 03:08 PM    WBC 0-4 09/20/2020 03:08 PM    RBC 0-5 09/20/2020 03:08 PM         Medications Reviewed:     Current Facility-Administered Medications   Medication Dose Route Frequency    sodium chloride (NS) flush 5-40 mL  5-40 mL IntraVENous Q8H    sodium chloride (NS) flush 5-40 mL  5-40 mL IntraVENous PRN    sodium chloride (NS) flush 5-40 mL  5-40 mL IntraVENous Q8H    sodium chloride (NS) flush 5-40 mL  5-40 mL IntraVENous PRN    sodium chloride (NS) flush 10 mL  10 mL IntraVENous RAD ONCE    dexAMETHasone (DECADRON) tablet 2 mg  2 mg Oral Q12H    pantoprazole (PROTONIX) 40 mg in 0.9% sodium chloride 10 mL injection  40 mg IntraVENous Q12H    oxyCODONE ER (OxyCONTIN) tablet 20 mg  20 mg Oral Q12H    HYDROmorphone (DILAUDID) tablet 2 mg  2 mg Oral Q6H PRN    dextrose 5% and 0.9% NaCl infusion  75 mL/hr IntraVENous CONTINUOUS    tamsulosin (FLOMAX) capsule 0.4 mg  0.4 mg Oral QPM    docusate sodium (COLACE) capsule 100 mg  100 mg Oral BID    polyethylene glycol (MIRALAX) packet 17 g  17 g Oral DAILY    HYDROmorphone (PF) (DILAUDID) injection 2 mg  2 mg IntraVENous Q3H PRN     ______________________________________________________________________  EXPECTED LENGTH OF STAY: - - -  ACTUAL LENGTH OF STAY:          0                 Jaron Oliver MD

## 2020-12-30 NOTE — PERIOP NOTES
9221: .TRANSFER - IN REPORT:    Verbal report received from Marley(name) on [facility].  being received from [facility](unit) for ordered procedure      Report consisted of patients Situation, Background, Assessment and   Recommendations(SBAR). Information from the following report(s) SBAR, Kardex and MAR was reviewed with the receiving nurse. Opportunity for questions and clarification was provided. Pt retrieved via ALFRED Walsh from [facility] by Endo RN. Wife Tanvir Braga in attendance. Assessment completed upon patients arrival to unit and care assumed. 0720: . Patient has been evaluated by anesthesia pre-procedure. Patient alert and oriented. Vital signs will not be charted by the Endoscopy nurse. All vitals, airway, and loc are monitored by anesthesia staff throughout procedure. 0750: . Endoscope was pre-cleaned at bedside immediately following procedure by Jessica Zuniga.      2388: TRANSFER - OUT REPORT:    Verbal report given to Marley(name) on [facility].  being transferred to [facility](unit) for routine post - op       Report consisted of patients Situation, Background, Assessment and   Recommendations(SBAR). Information from the following report(s) Procedure Summary was reviewed with the receiving nurse. Lines:   Peripheral IV 12/29/20 Left Antecubital (Active)   Site Assessment Clean, dry, & intact 12/29/20 2330   Phlebitis Assessment 0 12/29/20 2330   Infiltration Assessment 0 12/29/20 2330   Dressing Status Clean, dry, & intact 12/29/20 2330   Dressing Type Transparent 12/29/20 2330   Hub Color/Line Status Pink; Infusing 12/29/20 2330        Opportunity for questions and clarification was provided. 6933: MD @ bedside. Transport here. [id number] Tomas RN on [address] updated.             ..

## 2020-12-30 NOTE — PROGRESS NOTES
[facility]       Medical Oncology progress note        Reason for Visit:   Fran Maddox is a 60 y.o. male who is seen in hospital consult for cholangiocarcinoma      HPI:       Fran Maddox is a 60 y.o. male with a diagnosis of extrahepatic cholangiocarcinoma. He presented to the ED in August 2017 with obstructive jaundice. He was found to have an obstructive lesion in the dital bile duct. The CT showed marked intrahepatic biliary dilation and common bile duct dilation to the level of the mid common bile duct, which ws markedly narrowed. He underwent a stenting procedure followed by spyglass cholangiogram. The brushings revealed a diagnosis of cholangiocarcinoma. He underwent a Whipple procedure on 10/06/2017. He completed 6 cycles of adjuvant Xeloda. PET scan showed increased activity in the soft tissue along the SMA. CT guided biopsy showed local recurrence. He underwent SBRT by Dr. Dinora Taylor in April 2020. He is currently admitted with worsening abdominal pain, persistent low back pain with scans concerning for metastatic disease     Treatment:     1. S/P Pancreatoduodenectomy on  10/06/2017  2. Adjuvant monotherapy with Capecitabine - s/p 6 cycles   (12/4/2017 - 05/2018)    Review of Systems:  Negative except Per HPI    Past Medical History:   Diagnosis Date    Aneurysm ([address] 2008    CEREBRAL    Arrhythmia     Previous a.fib, ablation, NSR now; NO LONGER FOLLOWED BY CARDIOLOGIST.     Autoimmune disease ([address]     SJOGREN'S    Cancer ([address]     COMMON BILE DUCT, ADENOCARCINOMA    Diabetes ([address]     Family history of skin cancer     Hypertension     Sepsis ([address] 2017    STENTING TO BILE DUCT    Status post chemotherapy     Stroke [facility]) 2008    brain aneurysm - no deficits    Sun-damaged skin     Sunburn, blistering      Past Surgical History:   Procedure Laterality Date    HX CHOLECYSTECTOMY      HX GI      COLONOSCOPY, POLYPS (BENIGN)    HX GI  10/06/2017 Viktor [facility]    • HX GI  2020    WHIPPLE REVISION   • HX HEART CATHETERIZATION  2005    Ablation    • HX ORTHOPAEDIC  2000    Spinal fusion W/ HARDWARE   • HX OTHER SURGICAL  11/07/2017    [place] Cath, Dr. Monroe[facility]    • HX VASCULAR ACCESS  2017    PORTACATH - then removed   • NEUROLOGICAL PROCEDURE UNLISTED  2005    [place] hole washout from cerebral hemorrhage      Family History   Problem Relation Age of Onset   • Cancer Father         Breast and Colon, MELANOMA   • Hypertension Father    • Cancer Mother         LEUKEMIA   • No Known Problems Sister    • Atrial Fibrillation Brother    • No Known Problems Sister    • No Known Problems Son    • No Known Problems Son    • Anesth Problems Neg Hx      Social History     Tobacco Use   • Smoking status: Never Smoker   • Smokeless tobacco: Never Used   Substance Use Topics   • Alcohol use: Never     Frequency: Never     Comment: very rare      Prior to Admission medications    Medication Sig Start Date End Date Taking? Authorizing Provider   HYDROmorphone (DILAUDID) 4 mg tablet Take 2 Tabs by mouth every three (3) hours as needed for Pain for up to 15 days. Max Daily Amount: 64 mg. 12/23/20 1/7/21  Lilibeth Brasher MD   HYDROmorphone (DILAUDID) 8 mg tablet Take 8 mg by mouth every four (4) hours as needed for Pain.    Provider, Historical   oxyCODONE ER (OxyCONTIN) 20 mg ER tablet Take 20 mg by mouth every twelve (12) hours.    Provider, Historical   traMADoL (ULTRAM) 50 mg tablet Take 50 mg by mouth every six (6) hours as needed for Pain.    Provider, Historical   oxyCODONE ER (OxyCONTIN) 20 mg ER tablet Take 1 Tab by mouth every eight (8) hours for 15 days. Max Daily Amount: 60 mg. 12/21/20 1/5/21  Fela Benjamin MD   dutasteride (AVODART) 0.5 mg capsule Take 1 Cap by mouth daily. 12/21/20   Silas Raya III, DO   Creon 36,000-114,000- 180,000 unit cpDR capsule Take 4 Caps by mouth three (3) times daily (with meals). 2-3 caps each meal and 1 cap for  snacks (see additional order) 12/21/20   Chelsey Raya III, DO   dexAMETHasone (DECADRON) 2 mg tablet Take 1 Tab by mouth two (2) times daily (with meals). 12/18/20   Bk Stevens MD   pantoprazole (PROTONIX) 40 mg tablet Take 1 Tab by mouth daily. 12/18/20   Bk Stevens MD   naloxone [facility]) 4 mg/actuation nasal spray Use 1 spray intranasally, then discard. Repeat with new spray every 2 min as needed for opioid overdose symptoms, alternating nostrils. 12/11/20   Flor Lebron MD   aluminum & magnesium hydroxide-simethicone (Maalox Maximum Strength) 400-400-40 mg/5 mL suspension Take 10 mL by mouth every six (6) hours as needed for Indigestion. Provider, Historical   vitamin A (AQUASOL A) 10,000 unit capsule Take 1 Cap by mouth daily. 10/26/20   Lizzy Raya III, DO   vitamin E (AQUA GEMS) 400 unit capsule Take 1 Cap by mouth daily. 10/26/20   Dillan Whitney III, DO   empagliflozin (Jardiance) 25 mg tablet Take 1 Tab by mouth nightly. 10/20/20   Chelsey Raya III, DO   gabapentin (NEURONTIN) 100 mg capsule Take 3 capsules by mouth twice a day. 10/20/20   Silas Raya III, DO   cyanocobalamin (VITAMIN B12) 1,000 mcg/mL injection INJECT CONTENTS OF ONE VIAL INTRAMUSCULARLY EVERY OTHER WEEK  Patient taking differently: INJECT CONTENTS OF ONE VIAL INTRAMUSCULARLY EVERY OTHER WEEK (FIRST AND 15TH OF EACH MONTH) 10/20/20   Dillan OGDEN III, DO   tamsulosin (FLOMAX) 0.4 mg capsule TAKE ONE CAPSULE BY MOUTH EVERY EVENING 10/20/20   Silas Raya III, DO   ramipriL (ALTACE) 5 mg capsule Take 1 Cap by mouth daily. 10/20/20   Silas Raya III, DO   lidocaine (LIDODERM) 5 % 1 Patch by TransDERmal route every twenty-four (24) hours. Apply patch to the affected area for 12 hours a day and remove for 12 hours a day. Patient taking differently: 1 Patch by TransDERmal route daily as needed.  Apply patch to the affected area for 12 hours a day and remove for 12 hours a day. 10/15/20   Natasha OGDEN III, DO   loperamide (IMODIUM) 2 mg capsule Take 2-4 mg by mouth as needed for Diarrhea. Give 4 mg after first loose stool. Give an additional 2 mg after each loose stool as needed up to a maximum of 8 doses (16 mg/day). Provider, Historical   lipase-protease-amylase (Creon) 36,000-114,000- 180,000 unit cpDR capsule Take 1 Cap by mouth as needed (for snacks). 2-3 caps each meal and 1 cap for snacks (see additional order)    Provider, Historical   finasteride (PROSCAR) 5 mg tablet TAKE 1 TABLET BY MOUTH EVERY DAY 4/13/20   Provider, Historical   acetaminophen (TYLENOL) 325 mg tablet Take 2 Tabs by mouth every four (4) hours as needed for Pain or Fever (Over the counter medication). 1/2/20   Ajit Knight NP   sildenafil citrate (VIAGRA) 50 mg tablet Take 1 Tab by mouth as needed (ED). 5/6/19   Marivel Raya III, DO   alpha-D-galactosidase (BEANO PO) Take 1 Tab by mouth two (2) times a day. Other, MD Flynn   cetirizine (ZYRTEC) 10 mg tablet Take 10 mg by mouth nightly.     Provider, Historical          Allergies   Allergen Reactions    Shellfish Derived Anaphylaxis     Soft shell crabs    Morphine Nausea and Vomiting    Pcn [Penicillins] Other (comments)     Pt stated \"always been told PCN\"  Pt tolerated other cephalosporins in the past           Objective:     GENERAL: cooperative, no distress  EYE: sclera anicteric  ABDOMEN: soft, non-tender  EXTREMITIES:  no edema  SKIN: Normal.  NEUROLOGIC: negative  PSYCH: alert, conversant  Musculoskeletal pin point back pain lower lumbar    CT Results (most recent):  Results from Hospital Encounter encounter on 12/29/20   CT GUIDED CELIAC BLOCK    Narrative PROCEDURE: CT guided celiac block    PRE PROCEDURE DIAGNOSIS: Cholangiocarcinoma, intractable pain    POST PROCEDURE DIAGNOSIS: SAME     OPERATING PHYSICIAN: Nikki Anne M.D.    ESTIMATED BLOOD LOSS: None    SPECIMENS REMOVED: None    COMPLICATIONS: None immediate. Moderate intravenous conscious sedation was supervised by Dr. Isabella Baig. The  patient was independently monitored by a registered nurse assigned to the  Department of Radiology using automated blood pressure, EKG, and pulse oximetry. The detail conscious sedation record is stored in the hospital information  system. Medication:  Versed: 3 mg  Fentanyl: 50 mcg  Intraprocedure time: 20 minutes    Procedure and findings:    Risks were explained to the patient and informed consent was obtained. Risks  included infection, bleeding, and injury to solid organs or bowel. Patient was placed prone on the CT table and preliminary radiographic images  were obtained of the abdomen which demonstrated soft tissue around the aorta and  the mid abdomen. Appropriate site was marked on the bilateral back. A 22-gauge 20 cm Chiba needle  was advanced under CT guidance just anterior to the abdominal aorta between the  celiac artery and SMA. Position was confirmed with CT. Subsequently, 5 cc of dilute contrast was injected. Subsequent CT images  demonstrate contrast around the abdominal aorta. No intravascular contrast.    Next, a test injection of 5 cc of 1% lidocaine was injected through the needle. Patient was observed on the CT table for approximately 5 minutes. Vitals  remained stable and neurological exam was normal.    Next, approximately 5 cc of alcohol was injected through the needle with 5 cc of  bupivacaine. Prior to injection, the hub was checked and the needle was  aspirated and was negative for any blood return. Postinjection images demonstrated scattered hypodensity consistent with alcohol  draping the abdominal aorta on both sides. The needle was subsequently removed. Patient was stable throughout the  procedure. Patient was subsequently sent to recovery after a dry sterile dressing was  applied at the needle site.       Impression Impression:    Technically successful celiac block performed under CT guidance using alcohol  and bupivacaine. Patient tolerated the procedure well without immediate  complication. MRI Lumbar spine 12/29/2020  IMPRESSION  IMPRESSION:    1. Focal lesion in the L5 vertebral body with associated signal abnormality and  abnormal peripheral enhancement, favored to represent metastatic disease. 2. No additional metastatic disease identified in the lumbar spine. 3. Postsurgical changes of L5-S1 posterior spinal fusion and decompression, and  postsurgical changes of L4 laminectomy. Degenerative changes as detailed above. No significant spinal canal stenosis at any level. 4. Redemonstrated perivascular, periaortic tumor in the upper abdomen        Assessment/PLAN:     1. Extrahepatic cholangiocarcinoma- stage IV    Prior hx:  T3 N1 (1 of 12 LN +ve)  Invasion into pancreas  R0 resection   hx of Pancreatoduodenectomy on  10/06/2017  Completed adjuvant treatment with single agent Capecitabine - s/p 6 cycles (12/4/2017 - 05/2018). Local recurrence in the para-aortic soft tissue  2/2020 s/p SBRT  12/2020 Scans reviewed personally with progressive stage IV disease- L5 metastasis, growth in paraaortic mass, worsening pain and small worrisome lung nodules  CA 19-9-- 86. Discussed that he has stage IV disease  Treatments are palliative  Reviewed in general options for systemic chemotherapy/ role of molecular testing  He will hold off on the port and resume these discussions with Dr. Nicolasa Matta on D/C      2. Lower abdominal pain  Secondary to enlarging paraaortic mass  Agree with repeat celiac plexus block, continue prn Dilaudid    3. Low back pain  Secondary to L5 metastasis  Discussed with Rad Onc and primary oncologist  Plan is to consider RFA/ Kypho with IR  Start Dexamethasone 2 mg BID  PPI      4.   Leucopenia  Monitor          Signed by: Fatmata Monroe MD                     December 30, 2020

## 2020-12-30 NOTE — CONSULTS
Patient well known to our department. 60 yoM with h/o T3N1 extrahepatic cholangiocarcinoma s/p Whipple resection 10/2017 with close 1mm margin and 1/12 LN positive with adjuvant Xeloda x 6 cycles ending May 2018. Now s/p 40Gy in 5 fx SBRT to a solitary metastatic lesion in a PA lymph node/RP completed 3/13/2020. He is now admitted with abdominal pain and is currently in IR undergoing celiac plexus block. Radiation Oncology was consulted to weigh in on treatment of his L5 lesion seen on imaging. I have spoken with Dr. Starr Olguin, who has spoken with medical oncology. At this time, the plan is for ablation and kypho to the lesion, and Dr. Starr Olguin will follow up with him afterwards to determine if radiation is indicated. If you would like us to see him on this admission, please feel free to contact Radiation Oncology at [phone].

## 2020-12-30 NOTE — H&P
[address] [facility].  217 [facility] 140 [facility], [address]  [phone]                                History and Physical     NAME: Yennifer Jurado :  1955   MRN:  [id number]     HPI:  The patient was seen and examined. Past Surgical History:   Procedure Laterality Date    HX CHOLECYSTECTOMY      HX GI      COLONOSCOPY, POLYPS (BENIGN)    HX GI  10/06/2017    Whipple Dr. Viktoria Gonsalez [facility]     HX GI  2020    WHIPPLE REVISION    HX HEART CATHETERIZATION  2005    Ablation     HX ORTHOPAEDIC      Spinal fusion W/ HARDWARE    HX OTHER SURGICAL  2017    [place] Cath, Dr. Shree Millan      PORTACATH - then removed    NEUROLOGICAL PROCEDURE UNLISTED      Dubois hole washout from cerebral hemorrhage     Past Medical History:   Diagnosis Date    Aneurysm ([facility] 2008    CEREBRAL    Arrhythmia     Previous a.fib, ablation, NSR now; NO LONGER FOLLOWED BY CARDIOLOGIST.     Autoimmune disease ([facility]     SJOGREN'S    Cancer ([facility]     COMMON BILE DUCT, ADENOCARCINOMA    Diabetes ([facility]     Family history of skin cancer     Hypertension     Sepsis ([facility] 2017    STENTING TO BILE DUCT    Status post chemotherapy     Stroke [facility]) 2008    brain aneurysm - no deficits    Sun-damaged skin     Sunburn, blistering      Social History     Tobacco Use    Smoking status: Never Smoker    Smokeless tobacco: Never Used   Substance Use Topics    Alcohol use: Never     Frequency: Never     Comment: very rare    Drug use: No     Allergies   Allergen Reactions    Shellfish Derived Anaphylaxis     Soft shell crabs    Morphine Nausea and Vomiting    Pcn [Penicillins] Other (comments)     Pt stated \"always been told PCN\"  Pt tolerated other cephalosporins in the past     Family History   Problem Relation Age of Onset    Cancer Father         Breast and Colon, MELANOMA    Hypertension Father     Cancer Mother         LEUKEMIA    No Known Problems Sister    Blase Liming Atrial Fibrillation Brother     No Known Problems Sister     No Known Problems Son     No Known Problems Son     Anesth Problems Neg Hx      Current Facility-Administered Medications   Medication Dose Route Frequency    0.9% sodium chloride infusion  50 mL/hr IntraVENous CONTINUOUS    sodium chloride (NS) flush 5-40 mL  5-40 mL IntraVENous Q8H    sodium chloride (NS) flush 5-40 mL  5-40 mL IntraVENous PRN    midazolam (VERSED) injection 0.25-10 mg  0.25-10 mg IntraVENous Multiple    fentaNYL citrate (PF) injection 100 mcg  100 mcg IntraVENous MULTIPLE DOSE GIVEN    naloxone (NARCAN) injection 0.4 mg  0.4 mg IntraVENous Multiple    flumazeniL (ROMAZICON) 0.1 mg/mL injection 0.2 mg  0.2 mg IntraVENous Multiple    simethicone (MYLICON) 17EX/4.9YW oral drops 80 mg  1.2 mL Oral Multiple    atropine injection 0.5 mg  0.5 mg IntraVENous ONCE PRN    EPINEPHrine (ADRENALIN) 0.1 mg/mL syringe 1 mg  1 mg Endoscopically ONCE PRN    0.9% sodium chloride infusion  50 mL/hr IntraVENous CONTINUOUS    sodium chloride (NS) flush 5-40 mL  5-40 mL IntraVENous Q8H    sodium chloride (NS) flush 5-40 mL  5-40 mL IntraVENous PRN    midazolam (VERSED) injection 0.25-10 mg  0.25-10 mg IntraVENous Multiple    fentaNYL citrate (PF) injection 100 mcg  100 mcg IntraVENous MULTIPLE DOSE GIVEN    naloxone (NARCAN) injection 0.4 mg  0.4 mg IntraVENous Multiple    flumazeniL (ROMAZICON) 0.1 mg/mL injection 0.2 mg  0.2 mg IntraVENous Multiple    simethicone (MYLICON) 01BF/9.7TD oral drops 80 mg  1.2 mL Oral Multiple    atropine injection 0.5 mg  0.5 mg IntraVENous ONCE PRN    EPINEPHrine (ADRENALIN) 0.1 mg/mL syringe 1 mg  1 mg Endoscopically ONCE PRN    sodium chloride (NS) flush 10 mL  10 mL IntraVENous RAD ONCE    pantoprazole (PROTONIX) 40 mg in 0.9% sodium chloride 10 mL injection  40 mg IntraVENous Q12H    oxyCODONE ER (OxyCONTIN) tablet 20 mg  20 mg Oral Q12H    HYDROmorphone (DILAUDID) tablet 2 mg  2 mg Oral Q6H PRN    dextrose 5% and 0.9% NaCl infusion  75 mL/hr IntraVENous CONTINUOUS    tamsulosin (FLOMAX) capsule 0.4 mg  0.4 mg Oral QPM    docusate sodium (COLACE) capsule 100 mg  100 mg Oral BID    polyethylene glycol (MIRALAX) packet 17 g  17 g Oral DAILY    HYDROmorphone (PF) (DILAUDID) injection 2 mg  2 mg IntraVENous Q3H PRN         PHYSICAL EXAM:  General: WD, WN. Alert, cooperative, no acute distress    HEENT: NC, Atraumatic. PERRLA, EOMI. Anicteric sclerae. Lungs:  CTA Bilaterally. No Wheezing/Rhonchi/Rales. Heart:  Regular  rhythm,  No murmur, No Rubs, No Gallops  Abdomen: Soft, Non distended, Non tender. +Bowel sounds, no HSM  Extremities: No c/c/e  Neurologic:  CN 2-12 gi, Alert and oriented X 3. No acute neurological distress   Psych:   Good insight. Not anxious nor agitated. The heart, lungs and mental status were satisfactory for the administration of MAC sedation and for the procedure. Mallampati score: 2     The patient was counseled at length about the risks of sofía Covid-19 in the mervin-operative and post-operative states including the recovery window of their procedure. The patient was made aware that sofía Covid-19 after a surgical procedure may worsen their prognosis for recovering from the virus and lend to a higher morbidity and or mortality risk. The patient was given the options of postponing their procedure. All of the risks, benefits, and alternatives were discussed. The patient does wish to proceed with the procedure.       Assessment:   · Epigastric pain    Plan:   · Endoscopic procedure :egd  · MAC sedation

## 2020-12-30 NOTE — PROGRESS NOTES
Spiritual Care Assessment/Progress Note  ST. [address]      NAME: Marixa Christopher MRN: [id number]  AGE: 60 y.o. SEX: male  Holiness Affiliation: Rastafari   Language: English     12/30/2020     Total Time (in minutes): 8     Spiritual Assessment begun in [facility] [phone] through conversation with:         []Patient        [] Family    [] Friend(s)        Reason for Consult: Initial/Spiritual assessment, patient floor     Spiritual beliefs: (Please include comment if needed)     [] Identifies with a lima tradition:         [] Supported by a lima community:            [] Claims no spiritual orientation:           [] Seeking spiritual identity:                [] Adheres to an individual form of spirituality:           [x] Not able to assess:                           Identified resources for coping:      [] Prayer                               [] Music                  [] Guided Imagery     [] Family/friends                 [] Pet visits     [] Devotional reading                         [x] Unknown     [] Other:                                             Interventions offered during this visit: (See comments for more details)                Plan of Care:     [] Support spiritual and/or cultural needs    [] Support AMD and/or advance care planning process      [] Support grieving process   [] Coordinate Rites and/or Rituals    [] Coordination with community clergy   [] No spiritual needs identified at this time   [] Detailed Plan of Care below (See Comments)  [] Make referral to Music Therapy  [] Make referral to Pet Therapy     [] Make referral to Addiction services  [] Make referral to [facility]  [] Make referral to Spiritual Care Partner  [] No future visits requested        [x] Follow up upon further referrals     Comments:  visit for initial spiritual assessment, palliative care consult. Staff with patient providing care at the time of this visit.   Please contact spiritual care for further referral or consult. Rev.  Valeria Fregoso MDiv, [facility], [facility] paging service: [phone])

## 2020-12-30 NOTE — ANESTHESIA PREPROCEDURE EVALUATION
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)   (+) Cholangiocarcinoma of biliary tract (HCC)       Anesthetic History     PONV          Review of Systems / Medical History  Patient summary reviewed, nursing notes reviewed and pertinent labs reviewed    Pulmonary          Undiagnosed apnea         Neuro/Psych       CVA  TIA     Cardiovascular    Hypertension        Dysrhythmias       Exercise tolerance: >4 METS     GI/Hepatic/Renal               Comments: whippple Endo/Other    Diabetes    Cancer     Other Findings   Comments: Extrahepatic cholangiocarcinoma          Physical Exam    Airway  Mallampati: I  TM Distance: > 6 cm  Neck ROM: normal range of motion   Mouth opening: Normal     Cardiovascular    Rhythm: regular           Dental  No notable dental hx       Pulmonary  Breath sounds clear to auscultation               Abdominal         Other Findings            Anesthetic Plan    ASA: 3  Anesthesia type: MAC          Induction: Intravenous  Anesthetic plan and risks discussed with: Patient

## 2020-12-30 NOTE — PROGRESS NOTES
TRANSFER - OUT REPORT:    Verbal report given to Gilford Starring, unit RN on [facility].  being transferred to  for routine progression of care       Report consisted of patients Situation, Background, Assessment and   Recommendations(SBAR). Information from the following report(s) SBAR, Procedure Summary and MAR was reviewed with the receiving nurse. Lines:   Peripheral IV 12/29/20 Left Antecubital (Active)   Site Assessment Clean, dry, & intact 12/29/20 2330   Phlebitis Assessment 0 12/29/20 2330   Infiltration Assessment 0 12/29/20 2330   Dressing Status Clean, dry, & intact 12/29/20 2330   Dressing Type Transparent 12/29/20 2330   Hub Color/Line Status Pink; Infusing 12/29/20 2330        Opportunity for questions and clarification was provided.       Patient transported with:   transport on stretcher back to unit; tolerating sips of ginger ale well

## 2020-12-30 NOTE — PROCEDURES
118 [facility].  217 [facility] 140 [facility], [address]  [phone]                            NAME:  Cassidy Krishnamurthy. :   1955   MRN:   [id number]     Date/Time:  2020 7:48 AM    Esophagogastroduodenoscopy (EGD) Procedure Note    :  Raf Villagomez MD    Staff: Endoscopy Technician-1: Hyacinth Hopkins  Endoscopy RN-1: Roxie Red RN    Referring Provider:  Fela Hutchinson DO    Anethesia/Sedation:  MAC anesthesia    Procedure Details   After infomed consent was obtained for the procedure, with all risks and benefits of procedure explained the patient was taken to the endoscopy suite and placed in the left lateral decubitus position. Following sequential administration of sedation as per above, the gastroscope was inserted into the mouth and advanced under direct vision to mid-jejunum. A careful inspection was made as the gastroscope was withdrawn, including a retroflexed view of the proximal stomach; findings and interventions are described below. Findings:  Esophagus:normal  Stomach:minimal gastritis in body, evidence of rou-en-y anatomy with healthy appearing anastomosis   Duodenum/jejunum:healthy appearing efferent limb    Interventions:  none           Specimens Removed:  * No specimens in log *    Complications: None. EBL:  none    Impression:    See Postoperative diagnosis above    Recommendations:   - Unremarkable rou-en-y anatomy. Suspect worsening epigastric pain is related to progressive malignancy. Will confer with IR re: celiac plexus block.      Raf Villagomez MD

## 2020-12-31 ENCOUNTER — PATIENT OUTREACH (OUTPATIENT)
Dept: OTHER | Age: 65
End: 2020-12-31

## 2020-12-31 VITALS
OXYGEN SATURATION: 95 % | TEMPERATURE: 98.4 F | RESPIRATION RATE: 16 BRPM | SYSTOLIC BLOOD PRESSURE: 103 MMHG | DIASTOLIC BLOOD PRESSURE: 63 MMHG | HEART RATE: 77 BPM

## 2020-12-31 LAB
ALBUMIN SERPL-MCNC: 3.1 G/DL (ref 3.5–5)
ALBUMIN/GLOB SERPL: 1.1 {RATIO} (ref 1.1–2.2)
ALP SERPL-CCNC: 155 U/L (ref 45–117)
ALT SERPL-CCNC: 42 U/L (ref 12–78)
ANION GAP SERPL CALC-SCNC: 4 MMOL/L (ref 5–15)
AST SERPL-CCNC: 24 U/L (ref 15–37)
BASOPHILS # BLD: 0 K/UL (ref 0–0.1)
BASOPHILS NFR BLD: 0 % (ref 0–1)
BILIRUB SERPL-MCNC: 0.7 MG/DL (ref 0.2–1)
BUN SERPL-MCNC: 12 MG/DL (ref 6–20)
BUN/CREAT SERPL: 18 (ref 12–20)
CALCIUM SERPL-MCNC: 8.6 MG/DL (ref 8.5–10.1)
CHLORIDE SERPL-SCNC: 105 MMOL/L (ref 97–108)
CO2 SERPL-SCNC: 26 MMOL/L (ref 21–32)
CREAT SERPL-MCNC: 0.67 MG/DL (ref 0.7–1.3)
DIFFERENTIAL METHOD BLD: ABNORMAL
EOSINOPHIL # BLD: 0.1 K/UL (ref 0–0.4)
EOSINOPHIL NFR BLD: 1 % (ref 0–7)
ERYTHROCYTE [DISTWIDTH] IN BLOOD BY AUTOMATED COUNT: 12.8 % (ref 11.5–14.5)
GLOBULIN SER CALC-MCNC: 2.9 G/DL (ref 2–4)
GLUCOSE SERPL-MCNC: 167 MG/DL (ref 65–100)
HCT VFR BLD AUTO: 38.9 % (ref 36.6–50.3)
HGB BLD-MCNC: 13.4 G/DL (ref 12.1–17)
IMM GRANULOCYTES # BLD AUTO: 0.1 K/UL (ref 0–0.04)
IMM GRANULOCYTES NFR BLD AUTO: 1 % (ref 0–0.5)
LYMPHOCYTES # BLD: 0.4 K/UL (ref 0.8–3.5)
LYMPHOCYTES NFR BLD: 7 % (ref 12–49)
MCH RBC QN AUTO: 31.8 PG (ref 26–34)
MCHC RBC AUTO-ENTMCNC: 34.4 G/DL (ref 30–36.5)
MCV RBC AUTO: 92.2 FL (ref 80–99)
MONOCYTES # BLD: 0.4 K/UL (ref 0–1)
MONOCYTES NFR BLD: 7 % (ref 5–13)
NEUTS SEG # BLD: 4.1 K/UL (ref 1.8–8)
NEUTS SEG NFR BLD: 84 % (ref 32–75)
NRBC # BLD: 0 K/UL (ref 0–0.01)
NRBC BLD-RTO: 0 PER 100 WBC
PLATELET # BLD AUTO: 125 K/UL (ref 150–400)
PLATELET COMMENTS,PCOM: ABNORMAL
PMV BLD AUTO: 10.9 FL (ref 8.9–12.9)
POTASSIUM SERPL-SCNC: 4 MMOL/L (ref 3.5–5.1)
PROT SERPL-MCNC: 6 G/DL (ref 6.4–8.2)
RBC # BLD AUTO: 4.22 M/UL (ref 4.1–5.7)
RBC MORPH BLD: ABNORMAL
SODIUM SERPL-SCNC: 135 MMOL/L (ref 136–145)
WBC # BLD AUTO: 5.1 K/UL (ref 4.1–11.1)

## 2020-12-31 PROCEDURE — 96375 TX/PRO/DX INJ NEW DRUG ADDON: CPT

## 2020-12-31 PROCEDURE — 80053 COMPREHEN METABOLIC PANEL: CPT

## 2020-12-31 PROCEDURE — 99218 HC RM OBSERVATION: CPT

## 2020-12-31 PROCEDURE — 74011000250 HC RX REV CODE- 250: Performed by: PHYSICIAN ASSISTANT

## 2020-12-31 PROCEDURE — 74011250636 HC RX REV CODE- 250/636: Performed by: PHYSICIAN ASSISTANT

## 2020-12-31 PROCEDURE — C9113 INJ PANTOPRAZOLE SODIUM, VIA: HCPCS | Performed by: PHYSICIAN ASSISTANT

## 2020-12-31 PROCEDURE — 96376 TX/PRO/DX INJ SAME DRUG ADON: CPT

## 2020-12-31 PROCEDURE — 36415 COLL VENOUS BLD VENIPUNCTURE: CPT

## 2020-12-31 PROCEDURE — 74011000258 HC RX REV CODE- 258: Performed by: HOSPITALIST

## 2020-12-31 PROCEDURE — 74011250637 HC RX REV CODE- 250/637: Performed by: INTERNAL MEDICINE

## 2020-12-31 PROCEDURE — 99231 SBSQ HOSP IP/OBS SF/LOW 25: CPT | Performed by: SURGERY

## 2020-12-31 PROCEDURE — 74011250636 HC RX REV CODE- 250/636: Performed by: INTERNAL MEDICINE

## 2020-12-31 PROCEDURE — 74011250637 HC RX REV CODE- 250/637: Performed by: HOSPITALIST

## 2020-12-31 PROCEDURE — 74011250636 HC RX REV CODE- 250/636: Performed by: NURSE PRACTITIONER

## 2020-12-31 PROCEDURE — 85025 COMPLETE CBC W/AUTO DIFF WBC: CPT

## 2020-12-31 RX ORDER — OXYCODONE HCL 40 MG/1
40 TABLET, FILM COATED, EXTENDED RELEASE ORAL EVERY 8 HOURS
Qty: 30 TAB | Refills: 0 | Status: SHIPPED
Start: 2020-12-31 | End: 2021-01-20

## 2020-12-31 RX ORDER — AMOXICILLIN 250 MG
1 CAPSULE ORAL 2 TIMES DAILY
Status: DISCONTINUED | OUTPATIENT
Start: 2020-12-31 | End: 2020-12-31 | Stop reason: HOSPADM

## 2020-12-31 RX ORDER — DEXAMETHASONE 1 MG/1
TABLET ORAL
Qty: 56 TAB | Refills: 0 | Status: SHIPPED | OUTPATIENT
Start: 2020-12-31 | End: 2021-01-01 | Stop reason: ALTCHOICE

## 2020-12-31 RX ORDER — OXYCODONE HCL 10 MG/1
40 TABLET, FILM COATED, EXTENDED RELEASE ORAL EVERY 8 HOURS
Status: DISCONTINUED | OUTPATIENT
Start: 2020-12-31 | End: 2020-12-31 | Stop reason: HOSPADM

## 2020-12-31 RX ORDER — HYDROMORPHONE HYDROCHLORIDE 2 MG/1
8 TABLET ORAL
Status: DISCONTINUED | OUTPATIENT
Start: 2020-12-31 | End: 2020-12-31 | Stop reason: HOSPADM

## 2020-12-31 RX ADMIN — OXYCODONE HYDROCHLORIDE 20 MG: 10 TABLET, FILM COATED, EXTENDED RELEASE ORAL at 08:58

## 2020-12-31 RX ADMIN — PANTOPRAZOLE SODIUM 40 MG: 40 INJECTION, POWDER, FOR SOLUTION INTRAVENOUS at 08:58

## 2020-12-31 RX ADMIN — HYDROMORPHONE HYDROCHLORIDE 8 MG: 2 TABLET ORAL at 12:20

## 2020-12-31 RX ADMIN — Medication 10 ML: at 04:55

## 2020-12-31 RX ADMIN — HYDROMORPHONE HYDROCHLORIDE 2 MG: 2 INJECTION INTRAMUSCULAR; INTRAVENOUS; SUBCUTANEOUS at 01:45

## 2020-12-31 RX ADMIN — DEXAMETHASONE 2 MG: 1 TABLET ORAL at 08:58

## 2020-12-31 RX ADMIN — Medication 10 ML: at 13:55

## 2020-12-31 RX ADMIN — HYDROMORPHONE HYDROCHLORIDE 2 MG: 2 INJECTION INTRAMUSCULAR; INTRAVENOUS; SUBCUTANEOUS at 10:29

## 2020-12-31 RX ADMIN — HYDROMORPHONE HYDROCHLORIDE 2 MG: 2 INJECTION INTRAMUSCULAR; INTRAVENOUS; SUBCUTANEOUS at 04:53

## 2020-12-31 RX ADMIN — HYDROMORPHONE HYDROCHLORIDE 2 MG: 2 INJECTION INTRAMUSCULAR; INTRAVENOUS; SUBCUTANEOUS at 08:20

## 2020-12-31 RX ADMIN — DEXTROSE AND SODIUM CHLORIDE 75 ML/HR: 5; 900 INJECTION, SOLUTION INTRAVENOUS at 08:23

## 2020-12-31 RX ADMIN — OXYCODONE HYDROCHLORIDE 40 MG: 10 TABLET, FILM COATED, EXTENDED RELEASE ORAL at 13:53

## 2020-12-31 NOTE — DISCHARGE SUMMARY
Discharge Summary       PATIENT ID: Nikloai Trujillo. MRN: [id number]   YOB: 1955    DATE OF ADMISSION: 12/29/2020 12:38 PM    DATE OF DISCHARGE: 12.31/2020   PRIMARY CARE PROVIDER: Aylin Mcdaniels DO     ATTENDING PHYSICIAN: Flori Schmitt  DISCHARGING PROVIDER: Diannia Frankel, MD    To contact this individual call [phone] and ask the  to page. If unavailable ask to be transferred the Adult Hospitalist Department. CONSULTATIONS: IP CONSULT TO GASTROENTEROLOGY  IP CONSULT TO ONCOLOGY  IP CONSULT TO PALLIATIVE CARE - PROVIDER  IP CONSULT TO INTERVENTIONAL RADIOLOGY  IP CONSULT TO RADIATION ONCOLOGY  IP CONSULT TO PAIN MANAGEMENT  IP CONSULT TO GENERAL SURGERY    PROCEDURES/SURGERIES: Procedure(s):  ESOPHAGOGASTRODUODENOSCOPY (EGD)   :-    ADMITTING [address] [place] [address] COURSE:    Nikolai Trujillo. is a 60 y.o. male with past medical history of cholangiocarcinoma s/p Whipple's, s/p chemotherapy, autoimmune disease, aneurysm-cerebral and other comorbidities came to the hospital with a chief complaint of abdominal pain.     Patient states that she has severe abdominal pain-epigastric/periumbilical, rates it as 10 on 10 intensity for the last 3 days which is a constant pain, patient states that the narcotic pain medication regimen he takes at home-OxyContin and Dilaudid are not relieving this pain. He states that he feels nauseous. He denied any chest pain, shortness of breath, cough, dysuria. Patient states that he has chronic back pain but this abdominal pain is new for the last few days. Patient denied any constipation- has regular bowel movements.       He was supposed to get an MRI of the abdomen this Thursday per his oncologist recommendations. Plan for celiac plexus blockade in early January 2021.     In the emergency room, he received IV Dilaudid.         Hospital course     ##Intractable abdominal pain:  #Extrahepatic cholangiocarcinoma:  -S/p Whipple surgery and chemotherapy  -due to  malignancy. EGD showed minimal gastritis in body, evidence of rou-en-y anatomy with healthy appearing anastomosis ,otherwise normal   -CT abdomen showed  progression of disease,CT chest showed new lung nodules  MRI L spine showed L5 lesion  -Pain controlled-IV Dilaudid 2 mg every 3 hours as needed,orall dilaudid prn,continue OxyContin.  -s/p celiac plexus block   -Gen surg,oncology,palliative care following   seen by palliative team for the adjustment of the pain meds and follow up OP and also given decadron for 14 days on discharge   He will get port by surgery OP and see his oncologist and also will ge radiation to his lumbar lesion and kyphoplasty OP      #Cancer related pain-pain management as above. seen by palliative and adjustment made      #Nausea/vomiting-as needed Zofran.               DISCHARGE DIAGNOSES / PLAN:      Above      ADDITIONAL CARE RECOMMENDATIONS:   PLEASE FOLLOW UP WITH PCP      PENDING TEST RESULTS:   At the time of discharge the following test results are still pending:     FOLLOW UP APPOINTMENTS:    Follow-up Information     Follow up With Specialties Details Why Contact Info    Sharmaine Vargas DO Internal Medicine In 1 week  [address] [facility]  [address] [facility]      Nenita Hendrix MD Hematology and Oncology, Internal Medicine, Hematology, Oncology In 2 weeks  [address]  360 [facility]. [phone]               DIET: Regular Diet  Oral Nutritional Supplements:  ACTIVITY: Activity as tolerated    WOUND CARE:     EQUIPMENT needed:       DISCHARGE MEDICATIONS:  Current Discharge Medication List      CONTINUE these medications which have CHANGED    Details   oxyCODONE ER (OxyCONTIN) 40 mg ER tablet Take 1 Tab by mouth every eight (8) hours for 30 days.  Max Daily Amount: 120 mg.  Qty: 30 Tab, Refills: 0    Associated Diagnoses: Intractable abdominal pain; Cholangiocarcinoma of biliary tract (HCC)      dexAMETHasone (DECADRON) 1 mg tablet Take 2 tablets by mouth twice daily for 14 days  Qty: 56 Tab, Refills: 0         CONTINUE these medications which have NOT CHANGED    Details   HYDROmorphone (DILAUDID) 4 mg tablet Take 2 Tabs by mouth every three (3) hours as needed for Pain for up to 15 days. Max Daily Amount: 64 mg. Qty: 120 Tab, Refills: 0    Associated Diagnoses: Cholangiocarcinoma ([address]      traMADoL (ULTRAM) 50 mg tablet Take 50 mg by mouth every six (6) hours as needed for Pain. dutasteride (AVODART) 0.5 mg capsule Take 1 Cap by mouth daily. Qty: 90 Cap, Refills: 3      !! Creon 36,000-114,000- 180,000 unit cpDR capsule Take 4 Caps by mouth three (3) times daily (with meals). 2-3 caps each meal and 1 cap for snacks (see additional order)  Qty: 1080 Cap, Refills: 3    Associated Diagnoses: Acute pancreatitis without infection or necrosis, unspecified pancreatitis type      pantoprazole (PROTONIX) 40 mg tablet Take 1 Tab by mouth daily. Qty: 30 Tab, Refills: 0      naloxone (NARCAN) 4 mg/actuation nasal spray Use 1 spray intranasally, then discard. Repeat with new spray every 2 min as needed for opioid overdose symptoms, alternating nostrils. Qty: 1 Each, Refills: 0      aluminum & magnesium hydroxide-simethicone (Maalox Maximum Strength) 400-400-40 mg/5 mL suspension Take 10 mL by mouth every six (6) hours as needed for Indigestion. vitamin A (AQUASOL A) 10,000 unit capsule Take 1 Cap by mouth daily. Qty: 90 Cap, Refills: 3      vitamin E (AQUA GEMS) 400 unit capsule Take 1 Cap by mouth daily. Qty: 90 Cap, Refills: 3      empagliflozin (Jardiance) 25 mg tablet Take 1 Tab by mouth nightly. Qty: 90 Tab, Refills: 3    Associated Diagnoses: Type 2 diabetes mellitus without complication, without long-term current use of insulin ([facility])      gabapentin (NEURONTIN) 100 mg capsule Take 3 capsules by mouth twice a day.   Qty: 540 Cap, Refills: 3    Associated Diagnoses: Neuropathy cyanocobalamin (VITAMIN B12) 1,000 mcg/mL injection INJECT CONTENTS OF ONE VIAL INTRAMUSCULARLY EVERY OTHER WEEK  Qty: 6 mL, Refills: 6      tamsulosin (FLOMAX) 0.4 mg capsule TAKE ONE CAPSULE BY MOUTH EVERY EVENING  Qty: 90 Cap, Refills: 3      ramipriL (ALTACE) 5 mg capsule Take 1 Cap by mouth daily. Qty: 90 Cap, Refills: 3      lidocaine (LIDODERM) 5 % 1 Patch by TransDERmal route every twenty-four (24) hours. Apply patch to the affected area for 12 hours a day and remove for 12 hours a day. Qty: 30 Each, Refills: 5      loperamide (IMODIUM) 2 mg capsule Take 2-4 mg by mouth as needed for Diarrhea. Give 4 mg after first loose stool. Give an additional 2 mg after each loose stool as needed up to a maximum of 8 doses (16 mg/day). !! lipase-protease-amylase (Creon) 36,000-114,000- 180,000 unit cpDR capsule Take 1 Cap by mouth as needed (for snacks). 2-3 caps each meal and 1 cap for snacks (see additional order)      finasteride (PROSCAR) 5 mg tablet TAKE 1 TABLET BY MOUTH EVERY DAY      acetaminophen (TYLENOL) 325 mg tablet Take 2 Tabs by mouth every four (4) hours as needed for Pain or Fever (Over the counter medication). Qty: 1 Tab, Refills: 0    Comments: Over the counter medication      sildenafil citrate (VIAGRA) 50 mg tablet Take 1 Tab by mouth as needed (ED). Qty: 30 Tab, Refills: 1      alpha-D-galactosidase (BEANO PO) Take 1 Tab by mouth two (2) times a day. cetirizine (ZYRTEC) 10 mg tablet Take 10 mg by mouth nightly. !! - Potential duplicate medications found. Please discuss with provider. NOTIFY YOUR PHYSICIAN FOR ANY OF THE FOLLOWING:   Fever over 101 degrees for 24 hours. Chest pain, shortness of breath, fever, chills, nausea, vomiting, diarrhea, change in mentation, falling, weakness, bleeding. Severe pain or pain not relieved by medications. Or, any other signs or symptoms that you may have questions about.     DISPOSITION:  x  Home With:   OT  PT  [facility]  RN Long term SNF/Inpatient Rehab    Independent/assisted living    Hospice    Other:       PATIENT CONDITION AT DISCHARGE:     Functional status    Poor     Deconditioned    x Independent      Cognition   x  Lucid     Forgetful     Dementia      Catheters/lines (plus indication)    Vega     PICC     PEG    x None      Code status    x Full code     DNR      PHYSICAL EXAMINATION AT DISCHARGE:  General:          Alert, cooperative, no distress, appears stated age. HEENT:           Atraumatic, anicteric sclerae, pink conjunctivae                          No oral ulcers, mucosa moist, throat clear, dentition fair  Neck:               Supple, symmetrical  Lungs:             Clear to auscultation bilaterally. No Wheezing or Rhonchi. No rales. Chest wall:      No tenderness  No Accessory muscle use. Heart:              Regular  rhythm,  No  murmur   No edema  Abdomen:        Soft, non-tender. Not distended. Bowel sounds normal  Extremities:     No cyanosis. No clubbing,                            Skin turgor normal, Capillary refill normal  Skin:                Not pale. Not Jaundiced  No rashes   Psych:             Not anxious or agitated.   Neurologic:      Alert, moves all extremities, answers questions appropriately and responds to commands       CHRONIC MEDICAL DIAGNOSES:  Problem List as of 12/31/2020 Date Reviewed: 11/17/2020          Codes Class Noted - Resolved    Intractable back pain ICD-10-CM: M54.9  ICD-9-CM: 724.5  Unknown - Present        Intractable abdominal pain ICD-10-CM: R10.9  ICD-9-CM: 789.00  12/29/2020 - Present        Right inguinal hernia ICD-10-CM: K40.90  ICD-9-CM: 550.90  12/11/2020 - Present        Nausea & vomiting ICD-10-CM: R11.2  ICD-9-CM: 787.01  9/2/2020 - Present        Abdominal pain ICD-10-CM: R10.9  ICD-9-CM: 789.00  12/31/2019 - Present        Pancreatitis ICD-10-CM: K85.90  ICD-9-CM: 577.0  11/17/2019 - Present        Acute pancreatitis ICD-10-CM: K85.90  ICD-9-CM: 137.2 8/16/2018 - Present        Leukocytosis ICD-10-CM: T72.171  ICD-9-CM: 288.60  8/16/2018 - Present        Controlled type 2 diabetes mellitus without complication, without long-term current use of insulin (HCC) (Chronic) ICD-10-CM: E11.9  ICD-9-CM: 250.00  7/30/2018 - Present        Vomiting ICD-10-CM: R11.10  ICD-9-CM: 787.03  7/5/2018 - Present        Paroxysmal atrial fibrillation (HCC) (Chronic) ICD-10-CM: I48.0  ICD-9-CM: 427.31  10/26/2017 - Present        S/P ablation of atrial fibrillation (Chronic) ICD-10-CM: S81.046, Z86.79  ICD-9-CM: V45.89  10/26/2017 - Present        Essential hypertension (Chronic) ICD-10-CM: I10  ICD-9-CM: 401.9  10/26/2017 - Present        Cramps, muscle, general ICD-10-CM: R25.2  ICD-9-CM: 729.82  10/26/2017 - Present        Low vitamin D level (Chronic) ICD-10-CM: R79.89  ICD-9-CM: 790.6  10/26/2017 - Present        Low vitamin B12 level (Chronic) ICD-10-CM: E53.8  ICD-9-CM: 266.2  10/26/2017 - Present        Cholangiocarcinoma determined by biopsy of biliary tract ([facility] ICD-10-CM: C24.9  ICD-9-CM: 156.9  10/6/2017 - Present        Cholangiocarcinoma of biliary tract ([facility] ICD-10-CM: C22.1  ICD-9-CM: 155.1  9/11/2017 - Present        Common bile duct (CBD) obstruction ICD-10-CM: K83.1  ICD-9-CM: 576.2  7/25/2017 - Present        UTI (urinary tract infection) ICD-10-CM: N39.0  ICD-9-CM: 599.0  7/25/2017 - Present        Obstructive jaundice ICD-10-CM: K83.1  ICD-9-CM: 576.2  7/23/2017 - Present        RESOLVED: Pancreatitis ICD-10-CM: K85.90  ICD-9-CM: 577.0  8/31/2018 - 8/31/2018        RESOLVED: Sjogren's disease ([facility] (Chronic) ICD-10-CM: M35.00  ICD-9-CM: 710.2  10/26/2017 - 12/2/2019        RESOLVED: Sepsis ([facility] ICD-10-CM: A41.9  ICD-9-CM: 038.9, 995.91  8/25/2017 - 8/31/2017              Greater than 30  minutes were spent with the patient on counseling and coordination of care    Signed:   Queen Terrance MD  12/31/2020  1:53 PM

## 2020-12-31 NOTE — DISCHARGE INSTRUCTIONS
Discharge Instructions       PATIENT ID: Eleazar Miranda MRN: [id number]   YOB: 1955    DATE OF ADMISSION: 12/29/2020 12:38 PM    DATE OF DISCHARGE: 12/31/2020    PRIMARY CARE PROVIDER: Eugene Munson DO     ATTENDING PHYSICIAN: Breana Sneed MD  DISCHARGING PROVIDER: Kai Cole MD    To contact this individual call [phone] and ask the  to page. If unavailable ask to be transferred the Adult Hospitalist Department. DISCHARGE DIAGNOSES   Back pain     CONSULTATIONS: IP CONSULT TO GASTROENTEROLOGY  IP CONSULT TO ONCOLOGY  IP CONSULT TO PALLIATIVE CARE - PROVIDER  IP CONSULT TO INTERVENTIONAL RADIOLOGY  IP CONSULT TO RADIATION ONCOLOGY  IP CONSULT TO PAIN MANAGEMENT  IP CONSULT TO GENERAL SURGERY    PROCEDURES/SURGERIES: Procedure(s):  ESOPHAGOGASTRODUODENOSCOPY (EGD)   :-    PENDING TEST RESULTS:   At the time of discharge the following test results are still pending:     FOLLOW UP APPOINTMENTS:   Follow-up Information     Follow up With Specialties Details Why Contact Info    Eugene Munson DO Internal Medicine In 1 week  [address]  [address]  [address] [facility]      Panfilo Whalen MD Hematology and Oncology, Internal Medicine, Hematology, Oncology In 2 weeks  [address]  [facility]  [phone]             ADDITIONAL CARE RECOMMENDATIONS:   Please follow up with radiation oncology and your oncologist and palliative team for the pain management     DIET: Regular Diet  Oral Nutritional Supplements:     ACTIVITY: Activity as tolerated    WOUND CARE:     EQUIPMENT needed:       Radiology      DISCHARGE MEDICATIONS:   See Medication Reconciliation Form    · It is important that you take the medication exactly as they are prescribed.    · Keep your medication in the bottles provided by the pharmacist and keep a list of the medication names, dosages, and times to be taken in your wallet. · Do not take other medications without consulting your doctor. NOTIFY YOUR PHYSICIAN FOR ANY OF THE FOLLOWING:   Fever over 101 degrees for 24 hours. Chest pain, shortness of breath, fever, chills, nausea, vomiting, diarrhea, change in mentation, falling, weakness, bleeding. Severe pain or pain not relieved by medications. Or, any other signs or symptoms that you may have questions about. DISPOSITION:   xx Home With:   OT  PT  HH  RN       SNF/Inpatient Rehab/LTAC    Independent/assisted living    Hospice    Other:     CDMP Checked:   Yes x     PROBLEM LIST Updated:  Yes x       Signed:   Guillermo Kraft MD  12/31/2020  1:52 PM       My Medications      CHANGE how you take these medications      Instructions Each Dose to Equal Morning Noon Evening Bedtime   cyanocobalamin 1,000 mcg/mL injection  Commonly known as: VITAMIN B12  What changed: additional instructions    Your last dose was: Your next dose is:         INJECT CONTENTS OF ONE VIAL INTRAMUSCULARLY EVERY OTHER WEEK                  dexAMETHasone 1 mg tablet  Commonly known as: DECADRON  What changed:   · medication strength  · how much to take  · how to take this  · when to take this  · additional instructions    Your last dose was: Your next dose is: Take 2 tablets by mouth twice daily for 14 days                  HYDROmorphone 4 mg tablet  Commonly known as: DILAUDID  What changed: Another medication with the same name was removed. Continue taking this medication, and follow the directions you see here. Your last dose was: Your next dose is: Take 2 Tabs by mouth every three (3) hours as needed for Pain for up to 15 days. Max Daily Amount: 64 mg.   8 mg                 lidocaine 5 %  Commonly known as: LIDODERM  What changed:   · when to take this  · reasons to take this    Your last dose was: Your next dose is:         1 Patch by TransDERmal route every twenty-four (24) hours.  Apply patch to the affected area for 12 hours a day and remove for 12 hours a day. 1 Patch                 oxyCODONE ER 40 mg ER tablet  Commonly known as: OxyCONTIN  What changed:   · medication strength  · how much to take  · when to take this  · Another medication with the same name was removed. Continue taking this medication, and follow the directions you see here. Your last dose was: Your next dose is: Take 1 Tab by mouth every eight (8) hours for 30 days. Max Daily Amount: 120 mg.   40 mg                    CONTINUE taking these medications      Instructions Each Dose to Equal Morning Noon Evening Bedtime   acetaminophen 325 mg tablet  Commonly known as: TYLENOL    Your last dose was: Your next dose is: Take 2 Tabs by mouth every four (4) hours as needed for Pain or Fever (Over the counter medication). 650 mg                 BEANO PO    Your last dose was: Your next dose is: Take 1 Tab by mouth two (2) times a day. 1 Tab                 * Creon 36,000-114,000- 180,000 unit Cpdr capsule  Generic drug: lipase-protease-amylase    Your last dose was: Your next dose is: Take 1 Cap by mouth as needed (for snacks). 2-3 caps each meal and 1 cap for snacks (see additional order)   1 Cap                 * Creon 36,000-114,000- 180,000 unit Cpdr capsule  Generic drug: lipase-protease-amylase    Your last dose was: Your next dose is: Take 4 Caps by mouth three (3) times daily (with meals). 2-3 caps each meal and 1 cap for snacks (see additional order)   4 Cap                 dutasteride 0.5 mg capsule  Commonly known as: AVODART    Your last dose was: Your next dose is: Take 1 Cap by mouth daily. 0.5 mg                 empagliflozin 25 mg tablet  Commonly known as: Jardiance    Your last dose was: Your next dose is: Take 1 Tab by mouth nightly.    25 mg                 finasteride 5 mg tablet  Commonly known as: PROSCAR    Your last dose was: Your next dose is:         TAKE 1 TABLET BY MOUTH EVERY DAY                  gabapentin 100 mg capsule  Commonly known as: NEURONTIN    Your last dose was: Your next dose is: Take 3 capsules by mouth twice a day. loperamide 2 mg capsule  Commonly known as: IMODIUM    Your last dose was: Your next dose is: Take 2-4 mg by mouth as needed for Diarrhea. Give 4 mg after first loose stool. Give an additional 2 mg after each loose stool as needed up to a maximum of 8 doses (16 mg/day). 2-4 mg                 Maalox Maximum Strength 400-400-40 mg/5 mL suspension  Generic drug: aluminum & magnesium hydroxide-simethicone    Your last dose was: Your next dose is: Take 10 mL by mouth every six (6) hours as needed for Indigestion. 10 mL                 naloxone 4 mg/actuation nasal spray  Commonly known as: NARCAN    Your last dose was: Your next dose is:         Use 1 spray intranasally, then discard. Repeat with new spray every 2 min as needed for opioid overdose symptoms, alternating nostrils. pantoprazole 40 mg tablet  Commonly known as: PROTONIX    Your last dose was: Your next dose is: Take 1 Tab by mouth daily. 40 mg                 ramipriL 5 mg capsule  Commonly known as: ALTACE    Your last dose was: Your next dose is: Take 1 Cap by mouth daily. 5 mg                 sildenafil citrate 50 mg tablet  Commonly known as: Viagra    Your last dose was: Your next dose is: Take 1 Tab by mouth as needed (ED). 50 mg                 tamsulosin 0.4 mg capsule  Commonly known as: FLOMAX    Your last dose was: Your next dose is:         TAKE ONE CAPSULE BY MOUTH EVERY EVENING                  traMADoL 50 mg tablet  Commonly known as: ULTRAM    Your last dose was: Your next dose is: Take 50 mg by mouth every six (6) hours as needed for Pain.    50 mg                 vitamin A 10,000 unit capsule  Commonly known as: AQUASOL A    Your last dose was: Your next dose is: Take 1 Cap by mouth daily. 10,000 Units                 vitamin E 400 unit capsule  Commonly known as: AQUA GEMS    Your last dose was: Your next dose is: Take 1 Cap by mouth daily. 400 Units                 ZyrTEC 10 mg tablet  Generic drug: cetirizine    Your last dose was: Your next dose is: Take 10 mg by mouth nightly. 10 mg                     * This list has 2 medication(s) that are the same as other medications prescribed for you. Read the directions carefully, and ask your doctor or other care provider to review them with you. Where to Get Your Medications      These medications were sent to [address]    Phone: [phone]   · dexAMETHasone 1 mg tablet     Information on where to get these meds will be given to you by the nurse or doctor.     Ask your nurse or doctor about these medications  · oxyCODONE ER 40 mg ER tablet

## 2020-12-31 NOTE — PROGRESS NOTES
Bedside shift change report given to Jorge Alberto Hill RN (oncoming nurse) by Shaan Buckley RN (offgoing nurse). Report included the following information SBAR, Kardex, Intake/Output, MAR and Recent Results.

## 2020-12-31 NOTE — PROGRESS NOTES
Palliative Medicine Consult  Jesus: [phone] ([phone]    Patient Name: Esmer Lozoya. YOB: 1955    Date of Initial Consult: 12/31/20  Reason for Consult: Overwhelming symptoms  Requesting Provider: Dr. David Salter  Primary Care Physician: Yadi Trejo DO     SUMMARY:   Esmer Rose is a 60 y.o. with a past history of HTN, paroxysmal afib, DM and cholangiocarcinoma, diagnosed in 2017 and is s/p Whipple on 10/6/2017, currently followed by Dr. Maureen Campbell, Oncology, who was admitted on 12/29/2020 from home with a diagnosis of worsening abdominal pain. He received chemotherapy with Xeloda; had recurrence on PET scan and confirmed by CT guided biopsy and is s/p radiation in March/April 2020. He actually had a celiac block and Whipple reversal on 9/9/20. Back pain worsened in early December and medication changes made (steroids initiated, Fentanyl changed to OxyContin) with imaging planned. He was also losing weight. However, in mean time abdominal pain worsened leading to Hospitalization. Of note, he had a right inguinal hernia repair with mesh on 12/11/20 with Dr. Cecilia Stauffer. Last seen Dr. Maureen Campbell 12/22 and Dr. Jackson Arechiga 12/2. Current medical issues leading to Palliative Medicine involvement include: L5 metastatic lesion by MRI 12/29/20; para-aortic mass, and lung nodules c/w metastatic disease. EGD negative on 12/30. Celiac plexus block for abdominal pain completed on 12/30 and ablation/ kyphoplasty for L5 lesion is planned. He is on dexamethasone.      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 This is second marriage for both of them. PALLIATIVE DIAGNOSES:   1. Metastatic cholangiocarcinoma  2. Abdominal pain due to above  3. Back pain due to L5 metastatic lesion  4. Weight loss  5. Constipation with opioids     PLAN:   1. Patient and wife desiring to return to oral home regimen in preparation for discharge  2. Given new abdominal (helped by Celiac Block) and back pain awaiting intervention, will adjust opioid regimen   3. Pain at an 8 right now in back; give Dilaudid 2mg IV now; alerted nurse  4. Increase Oxycontin PO from 20 to 40mg every 8 hours  5. Continue Dilaudid PO 8mg but decrease interval from 6 to 3 hours as needed for pain  6. Continue Dexamethasone 2mg bid x 14 days or until new treatment regimen determined    7. Stop Tramadol  8. Patient has recent scripts filled that can account for changes above until sees Dr. Gia Spann post discharge; NO OPIOID SCRIPTS NEEDED ON DISCHARGE  9. Wife Lamont marcum will call Palliative post discharge with number of pills left to ensure this will extend to appointment  10. Constipation; Zeny-colace 1 tab BID on discharge  11. Alerted Dr. Gia Spann as well as Hospitalist of recommendations  12. Noted ACP / Advance Directive on file; defer [address] conversations to primary clinical cancer team in outpatient setting  13. Initial consult note routed to primary continuity provider and/or primary health care team members  15.  Communicated plan of care with: Palliative Jose MONTIEL Team     GOALS OF CARE / TREATMENT PREFERENCES:     GOALS OF CARE:  Patient/Health Care Proxy Stated Goals: Prolong life    TREATMENT PREFERENCES:   Code Status: Full Code    Advance Care Planning:  [x] The [facility] Interdisciplinary Team has updated the ACP Navigator with Health Care Decision Maker and Patient Capacity      Primary Decision Maker: Corona Santamaria Spouse - [phone]    Secondary Decision Maker: Ridge Cristina III - Child - [phone]    Supplemental (Other) Decision Maker: Kathleen Pabon Child - [phone]  Advance Care Planning 12/31/2020   Patient's Healthcare Decision Maker is: Named in scanned ACP document   Primary Decision Maker Name -   Primary Decision Maker Phone Number -   Primary Decision Maker Relationship to Patient -   Confirm Advance Directive Yes, on file   Patient Would Like to Complete Advance Directive -   Does the patient have other document types -     Medical Interventions: Full interventions           Other:    As far as possible, the palliative care team has discussed with patient / health care proxy about goals of care / treatment preferences for patient.      HISTORY:     History obtained from:  Patient, wife, nurse, chart    CHIEF COMPLAINT: My stomach is better but my back hurts    HPI/SUBJECTIVE:    The patient is:   [x] Verbal and participatory  [] Non-participatory due to:      Clinical Pain Assessment (nonverbal scale for severity on nonverbal patients):   Clinical Pain Assessment  Severity: 8  Location: back  Character: sharp  Duration: months  Effect: hard to reposition  Factors: worse with movement  Frequency: hourly          Duration: for how long has pt been experiencing pain (e.g., 2 days, 1 month, years)  Frequency: how often pain is an issue (e.g., several times per day, once every few days, constant)     FUNCTIONAL ASSESSMENT:     Palliative Performance Scale (PPS):  PPS: 80       PSYCHOSOCIAL/SPIRITUAL SCREENING:     Palliative IDT has assessed this patient for cultural preferences / practices and a referral made as appropriate to needs (Cultural Services, Patient Advocacy, Ethics, etc.)    Any spiritual / Buddhist concerns:  [] Yes /  [x] No    Caregiver Burnout:  [] Yes /  [x] No /  [] No Caregiver Present      Anticipatory grief assessment:   [x] Normal  / [] Maladaptive       ESAS Anxiety: Anxiety: 0    ESAS Depression: Depression: 0        REVIEW OF SYSTEMS:     Positive and pertinent negative findings in ROS are noted above in HPI. The following systems were [x] reviewed / [] unable to be reviewed as noted in HPI  Other findings are noted below. Systems: constitutional, ears/nose/mouth/throat, respiratory, gastrointestinal, genitourinary, musculoskeletal, integumentary, neurologic, psychiatric, endocrine. Positive findings noted below. Modified ESAS Completed by: provider   Fatigue: 0 Drowsiness: 0   Depression: 0 Pain: 8   Anxiety: 0 Nausea: 0   Anorexia: 8 Dyspnea: 0   Best Well-Bein Constipation: No              PHYSICAL EXAM:     From RN flowsheet:  Wt Readings from Last 3 Encounters:   20 139 lb (63 kg)   20 144 lb (65.3 kg)   20 144 lb 13.5 oz (65.7 kg)     Blood pressure 123/70, pulse 68, temperature 97.8 °F (36.6 °C), resp. rate 16, SpO2 98 %. Pain Scale 1: Numeric (0 - 10)  Pain Intensity 1: 6  Pain Onset 1: chronic  Pain Location 1: Back  Pain Orientation 1: Right  Pain Description 1: Aching  Pain Intervention(s) 1: Medication (see MAR)  Last bowel movement, if known: some diarrhea post contrast    Constitutional: alert oriented, periodic facial expressions indicating pain  Eyes: pupils equal, anicteric  ENMT: no nasal discharge, moist mucous membranes  Cardiovascular: regular rhythm, distal pulses intact  Respiratory: breathing not labored, symmetric, clear bilaterally  Gastrointestinal: soft non-tender, +bowel sounds  Musculoskeletal: no deformity, tender along center of low back / L3-5 region  Skin: warm, dry  Neurologic: following commands, moving all extremities  Psychiatric: full affect, no hallucinations  Other:     HISTORY:     Active Problems:    Intractable abdominal pain (2020)      Past Medical History:   Diagnosis Date    Aneurysm ([facility] [address]     CEREBRAL    Arrhythmia     Previous a.fib, ablation, NSR now; NO LONGER FOLLOWED BY CARDIOLOGIST.  Autoimmune disease ([facility] [address]     SJOGREN'S    Cancer ([facility] [address]     COMMON BILE DUCT, ADENOCARCINOMA    Diabetes ([facility] [address]     Family history of skin cancer     Hypertension     Sepsis ([facility] [address] 2017    STENTING TO BILE DUCT    Status post chemotherapy     Stroke [facility]) 2008    brain aneurysm - no deficits    Sun-damaged skin     Sunburn, blistering       Past Surgical History:   Procedure Laterality Date    HX CHOLECYSTECTOMY      HX GI      COLONOSCOPY, POLYPS (BENIGN)    HX GI  10/06/2017    Whipple [facility]     HX GI  2020    WHIPPLE REVISION    HX HEART CATHETERIZATION  2005    Ablation     HX ORTHOPAEDIC  2000    Spinal fusion W/ HARDWARE    HX OTHER SURGICAL  11/07/2017    [place] Cath, Dr. Angela Seymour  2017    PORTACATH - then removed    NEUROLOGICAL PROCEDURE UNLISTED  2005    Lord Jessica hole washout from cerebral hemorrhage      Family History   Problem Relation Age of Onset    Cancer Father         Breast and Colon, MELANOMA    Hypertension Father     Cancer Mother         LEUKEMIA    No Known Problems Sister     Atrial Fibrillation Brother     No Known Problems Sister     No Known Problems Son     No Known Problems Son     Anesth Problems Neg Hx       History reviewed, no pertinent family history.   Social History     Tobacco Use    Smoking status: Never Smoker    Smokeless tobacco: Never Used   Substance Use Topics    Alcohol use: Never     Frequency: Never     Comment: very rare     Allergies   Allergen Reactions    Shellfish Derived Anaphylaxis     Soft shell crabs    Morphine Nausea and Vomiting    Pcn [Penicillins] Other (comments)     Pt stated \"always been told PCN\"  Pt tolerated other cephalosporins in the past      Current Facility-Administered Medications   Medication Dose Route Frequency    oxyCODONE ER (OxyCONTIN) tablet 40 mg  40 mg Oral Q8H    HYDROmorphone (DILAUDID) tablet 8 mg  8 mg Oral Q3H    sodium chloride (NS) flush 5-40 mL  5-40 mL IntraVENous Q8H    sodium chloride (NS) flush 5-40 mL  5-40 mL IntraVENous PRN    sodium chloride (NS) flush 5-40 mL  5-40 mL IntraVENous Q8H    sodium chloride (NS) flush 5-40 mL  5-40 mL IntraVENous PRN    dexAMETHasone (DECADRON) tablet 2 mg  2 mg Oral Q12H    pantoprazole (PROTONIX) 40 mg in 0.9% sodium chloride 10 mL injection  40 mg IntraVENous Q12H    dextrose 5% and 0.9% NaCl infusion  75 mL/hr IntraVENous CONTINUOUS    tamsulosin (FLOMAX) capsule 0.4 mg  0.4 mg Oral QPM    polyethylene glycol (MIRALAX) packet 17 g  17 g Oral DAILY    HYDROmorphone (PF) (DILAUDID) injection 2 mg  2 mg IntraVENous Q3H PRN      LAB AND IMAGING FINDINGS:     MRI Lumbar spine 12/29/2020  IMPRESSION:    1. Focal lesion in the L5 vertebral body with associated signal abnormality and abnormal peripheral enhancement, favored to represent metastatic disease. 2. No additional metastatic disease identified in the lumbar spine. 3. Postsurgical changes of L5-S1 posterior spinal fusion and decompression, and postsurgical changes of L4 laminectomy. Degenerative changes as detailed above. No significant spinal canal stenosis at any level. 4. Redemonstrated perivascular, periaortic tumor in the upper abdomen    CT 12/29/20  IMPRESSION:   1. Numerous tiny pulmonary parenchymal nodules right greater than left, suspicious for metastatic disease. 2. Focal ill-defined opacity in the right upper lobe which may represent infiltrate. Follow-up recommended, however. 3. Incidental findings in the upper abdomen described earlier same day. CT 12/29/20  IMPRESSION:   1. Surgical changes are again seen following Whipple procedure. Overall mildly increased perivascular recurrence in the superior retroperitoneum. Left renal vein occlusion and upper abdominal collateral vessels are again noted. 2. Round sclerotic lesion in the L5 vertebral body suspicious for metastasis.   3. Several scattered bilateral peripheral lower lung nodules measuring 1 to 2 mm may represent infection, inflammation, or metastasis. Lab Results   Component Value Date/Time    WBC 5.1 12/31/2020 04:46 AM    HGB 13.4 12/31/2020 04:46 AM    PLATELET 183 (L) 74/15/0833 04:46 AM     Lab Results   Component Value Date/Time    Sodium 135 (L) 12/31/2020 04:46 AM    Potassium 4.0 12/31/2020 04:46 AM    Chloride 105 12/31/2020 04:46 AM    CO2 26 12/31/2020 04:46 AM    BUN 12 12/31/2020 04:46 AM    Creatinine 0.67 (L) 12/31/2020 04:46 AM    Calcium 8.6 12/31/2020 04:46 AM    Magnesium 2.4 12/31/2019 07:37 AM    Phosphorus 4.0 08/18/2018 04:20 AM      Lab Results   Component Value Date/Time    Alk. phosphatase 155 (H) 12/31/2020 04:46 AM    Protein, total 6.0 (L) 12/31/2020 04:46 AM    Albumin 3.1 (L) 12/31/2020 04:46 AM    Globulin 2.9 12/31/2020 04:46 AM     Lab Results   Component Value Date/Time    INR 1.1 09/09/2020 02:15 AM    Prothrombin time 11.5 (H) 09/09/2020 02:15 AM    aPTT 29.9 09/09/2020 02:15 AM      Lab Results   Component Value Date/Time    Iron 31 (L) 01/02/2020 03:24 AM    TIBC 245 (L) 01/02/2020 03:24 AM    Iron % saturation 13 (L) 01/02/2020 03:24 AM    Ferritin 122 01/02/2020 03:24 AM      No results found for: PH, PCO2, PO2  No components found for: GLPOC   No results found for: CPK, CKMB             Total time:   Counseling / coordination time, spent as noted above:   > 50% counseling / coordination?:     Prolonged service was provided for  []30 min   []75 min in face to face time in the presence of the patient, spent as noted above. Time Start:   Time End:   Note: this can only be billed with 62769 (initial) or 10922 (follow up). If multiple start / stop times, list each separately.

## 2020-12-31 NOTE — PROGRESS NOTES
118 [facility].  217 [facility] 140 [place]al[address]sa Pradhan, [address]  [phone]                     GI PROGRESS NOTE    Patient Name: Teressa Son. : 1955      MRN: [id number]  Admit Date: 2020  Today's Date: 2020    Subjective:     He reports that the abdominal pain has completely resolved since having IR celiac plexus block yesterday. He continues to have back pain and understands that it is likely metastatic disease. He has talked to radiation oncology and has an appt next week with his regular oncologist Dr. Patrice Johnson. Objective:     Blood pressure 123/70, pulse 68, temperature 97.8 °F (36.6 °C), resp. rate 16, SpO2 98 %. Physical Exam:  General appearance: cooperative, no distress, appears comfortable  Skin: Extremities and face reveal no rashes, pallor or jaundice  HEENT: Sclerae anicteric. Extra-occular muscles are intact. Respiratory: Comfortable breathing   GI: Abdomen nondistended, soft, nontender  Neurological: Gross memory appears intact. Patient is alert and oriented. Psychiatric: Mood appears appropriate with good judgement. No anxiety or agitation. Data Review:    Recent Results (from the past 24 hour(s))   CBC WITH AUTOMATED DIFF    Collection Time: 20  4:46 AM   Result Value Ref Range    WBC 5.1 4.1 - 11.1 K/uL    RBC 4.22 4.10 - 5.70 M/uL    HGB 13.4 12.1 - 17.0 g/dL    HCT 38.9 36.6 - 50.3 %    MCV 92.2 80.0 - 99.0 FL    MCH 31.8 26.0 - 34.0 PG    MCHC 34.4 30.0 - 36.5 g/dL    RDW 12.8 11.5 - 14.5 %    PLATELET 026 (L) 068 - 400 K/uL    MPV 10.9 8.9 - 12.9 FL    NRBC 0.0 0  WBC    ABSOLUTE NRBC 0.00 0.00 - 0.01 K/uL    NEUTROPHILS 84 (H) 32 - 75 %    LYMPHOCYTES 7 (L) 12 - 49 %    MONOCYTES 7 5 - 13 %    EOSINOPHILS 1 0 - 7 %    BASOPHILS 0 0 - 1 %    IMMATURE GRANULOCYTES 1 (H) 0.0 - 0.5 %    ABS. NEUTROPHILS 4.1 1.8 - 8.0 K/UL    ABS. LYMPHOCYTES 0.4 (L) 0.8 - 3.5 K/UL    ABS. MONOCYTES 0.4 0.0 - 1.0 K/UL    ABS. EOSINOPHILS 0.1 0.0 - 0.4 K/UL    ABS. BASOPHILS 0.0 0.0 - 0.1 K/UL    ABS. IMM. GRANS. 0.1 (H) 0.00 - 0.04 K/UL    DF SMEAR SCANNED      PLATELET COMMENTS Large Platelets      RBC COMMENTS NORMOCYTIC, NORMOCHROMIC     METABOLIC PANEL, COMPREHENSIVE    Collection Time: 12/31/20  4:46 AM   Result Value Ref Range    Sodium 135 (L) 136 - 145 mmol/L    Potassium 4.0 3.5 - 5.1 mmol/L    Chloride 105 97 - 108 mmol/L    CO2 26 21 - 32 mmol/L    Anion gap 4 (L) 5 - 15 mmol/L    Glucose 167 (H) 65 - 100 mg/dL    BUN 12 6 - 20 MG/DL    Creatinine 0.67 (L) 0.70 - 1.30 MG/DL    BUN/Creatinine ratio 18 12 - 20      GFR est AA >60 >60 ml/min/1.73m2    GFR est non-AA >60 >60 ml/min/1.73m2    Calcium 8.6 8.5 - 10.1 MG/DL    Bilirubin, total 0.7 0.2 - 1.0 MG/DL    ALT (SGPT) 42 12 - 78 U/L    AST (SGOT) 24 15 - 37 U/L    Alk. 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 Salmeron next week  - Pain management per palliative    At this time, GI is signing off. Please call if our services are needed again prior to discharge.      Patient Active Hospital Problem List:   Active Problems:    Intractable abdominal pain (12/29/2020)      Intractable back pain ()

## 2020-12-31 NOTE — PROGRESS NOTES
Care Transitions Initial Follow Up Call    Patient with cholangiocarcinoma s/p pylorus-preserving Whipple 10/2017; G_J George-EN Y revision 2020, HTN, DM, BPH and chronic LBP  * IP stay  -   - intractable pain ;  Celiac plexus block/ palliative care. -Progressive stage IV disease- L5 metastasis, growth in paraaortic mass, worsening pain and small worrisome lung nodules    Call within 2 business days of discharge: Yes     Patient: Janae Cons. Patient : 1955 MRN: [id number]    Last Discharge [address]       Complaint Diagnosis Description Type Department Provider    20 Abdominal Pain Intractable periumbilical abdominal pain . .. ED to Hosp-Admission (Current) (ADMIT) Anjelica Ryan MD; Mary Kay Weiss . .. Challenges to be reviewed by the provider   Additional needs identified to be addressed with provider no  none and Wife is RN - palliative care in place. Discussed COVID-19 related testing which was not done at this time. Test results were not done. Patient informed of results, if available? NA        Method of communication with provider : none    Advance Care Planning:   Does patient have an Advance Directive: yes, reviewed and current     Inpatient Readmission Risk score: Unplanned Readmit Risk Score: 25    Was this a readmission? no   Patient stated reason for the admission: Pain due to advancing cancer. * Pain level has been helped a lot with Celiac plexus block,   - Mr. Lubna Ku has his appetite back and slept better. - Upsetting to patient and wife that ordered scans were denied by insurance. - Lingering back pain now supported with identifying metastatic leison on L5.      * IV still in. Awaiting Palliative orders for oral pain meds and they will be going home. * Wife is anxious to leave and get home. - In hospital over New Years last year, does not want to repeat.      - Mental framework/ but also impacts insurance payments and out of pocket expenses. Patients top risk factors for readmission: medical condition, medication management, polypharmacy and Pain level immunocompromised and diabetes  Interventions to address risk factors: Reviewed and followed up on pending diagnostic tests and treatments-block is helping/ awaiting palliative care discharge meds. , Education of patient/family/caregiver/guardian to support self-management-Wife is RN- she doesn't think he needs HH and Assessment and support for treatment adherence and medication management-Ongoing FU with palliative care/     Care Transition Nurse (CTN) contacted the Patient and wife on speaker phone by telephone to perform post hospital discharge assessment. Verified name and  with patient as identifiers. Provided introduction to self, and explanation of the CTN role. CTN reviewed discharge instructions, medical action plan and red flags with patient who verbalized understanding. Were discharge instructions available to patient? None printed/ but knows plan for next steps. Reviewed appropriate site of care based on symptoms and resources available to patient including: Benefits related nurse triage line and Santana Marrufo. Patient given an opportunity to ask questions and does not have any further questions or concerns at this time. The patient agrees to contact the PCP office for questions related to their healthcare. * Apt with Onc Thursday.    - hopes for genetic workup from pathology to identify future treatment options. - For Port-a-cath insertion anticipating chemotherapy. * Apt with Rad Onc for possible palliative treatment. Medication reconciliation was performed with patient, who verbalizes understanding of administration of home medications. Advised obtaining a 90-day supply of all daily and as-needed medications. * Meds will be through [facility] prior to going home.     Wife is a RN and comfortable with medications- Biggest change is Decadron. * CM will do complete Med Rec Monday. Referral to Pharm D needed: no     Home Health/Outpatient orders at discharge: none  Durable Medical Equipment ordered at discharge: None    Covid Risk Education    Patient has following risk factors of: immunocompromised. Education provided regarding infection prevention, and signs and symptoms of COVID-19 and when to seek medical attention with patient who verbalized understanding. Discussed exposure protocols and quarantine From CDC: Are you at higher risk for severe illness?  and given an opportunity for questions and concerns. The patient agrees to contact the COVID-19 hotline [phone] or PCP office for questions related to COVID-19. For more information on steps you can take to protect yourself, see CDC's How to Protect Yourself     Patient/family/caregiver given information for GetWell Loop and agrees to enroll no  Patient's preferred e-mail: declines  Patient's preferred phone number: declines    Discussed follow-up appointments. If no appointment was previously scheduled, appointment scheduling offered: NA - apts made Is follow up appointment scheduled within 7 days of discharge? yes   [address] follow up appointment(s):   Future Appointments   Date Time Provider Sloane Roach   12/31/2020  6:45 PM [id number] MRI [facility] REG   1/7/2021 11:30 AM Liz Meraz MD ONC BS AMB   1/13/2021 11:30 AM Noy Banerjee MD [facility] BS AMB     Non-[facility] follow up appointment(s): Rad Onc    Plan for follow-up call in 3-5 days based on severity of symptoms and risk factors. Plan for next call: symptom management-pain control. CTN provided contact information for future needs. Goals Addressed                 This Visit's Progress       Patient Stated     Care Coordination for chronic LBP since surgery.  (pt-stated)   Worsening     Patient with cholangiocarcinoma s/p pylorus-preserving Whipple 10/2017; G_J George-EN Y revision 09/02/2020, HTN, DM, BPH and chronic LBP. * Patient will be encouraged to attend physician's apt for evaluation of causes/ sources of pain with diagnosis and treatment options. * CM will support evaluation by PCP/ any referral to PT,  ortho or rheumatology for accurate assessment of pain causes; treatment options; self management. *Assess pain level by scale 1-10;  * Encourage patient to use tylenol  and prescribed lidocaine and fentanyl patch to keep pain under control;  * Encourage patient to warm soaks / ice and compression garment for pain management. - to determine what helps. * Use relaxation breathing techniques and distraction techniques as alternatives to medications when possible. 10/26  *  Abd - pancreatic pain reduced 90-95% - since surgery.      - No N/V since surgery. - Gained 10 pounds this month. *  Lower back pain - No scan yet- unknown cause. - Followed by Palliative for pain control.     - Lidocaine pain patch helps at night, able to sleep. -  Son-in-law is PT and has suggested some exercises- but has not helped. - Doesn't hurt when walking, moving- harder in the evenings just sitting. 11/20    LB Pain is managed- \"took a week to get the medicine right. \"   - Pending apt with Dr. Chanda Gaspar for evaluation of back pain. He is reviewing    - Mr. Michelle Griffin identifies one specific spot on his back- no radiating pain- No imaging of this area. * Surgery for hernia repair second week in Dec.  No specific date yet. * No N/V since surgery! - Awaiting insurance approval for PET scan for comparison and tracking cholangiocarcinoma. * CM asks about DM/ HTN   - Patient reports that his blood sugar and blood pressure are, \"Right where they should be. \"   A1C 5.5 10/15    12/10-  Hernia repair for 12/11   * Pain meds with Palliative have been helpful. - Pain at that spot in his back as well as herniated site.       - PET scan planned for Jan to r/o CA before referring for epidural steroid shot.      12/31 -   IP stay 12/29 - 12/31  - intractable pain ;  Celiac plexus block/ palliative care. LBP-  L5 metastasis, growth in paraaortic mass, worsening pain and small worrisome lung nodules-  RFA/ Kypho with IR and will start Dexamethasone 2 mg BID. Other     Attends follow-up appointments as directed. On track     Patient with cholangiocarcinoma s/p pylorus-preserving Whipple 10/2017; G_J George-EN Y revision 09/02/2020, HTN, DM, BPH and chronic LBP  * IP stay 12/29 - 12/31  ; Celiac plexus block/ palliative care. -Progressive stage IV disease- L5 metastasis, growth in paraaortic mass, worsening pain and small worrisome lung nodules    12/31-  For discharge today. * Apt with Onc Thursday.    - hopes for genetic workup from pathology to identify future treatment options. - For Port-a-cath insertion anticipating chemotherapy. * Apt with Rad Onc for possible palliative treatment.  COMPLETED: Prevent complications post hospitalization. * OP hernia repair 12/11     * Mr. Radha Alejandra reports surgery went well. Able to come home same day. * His wife is with him at home. Taking good care of him. * MD APTS:  12/30  Surgical Virtual FU.  IP stay [facility] 12/29-12/31- pain due to metastatic disease. Chart Review:    IP stay for pain     Assessment/PLAN:      1. Extrahepatic cholangiocarcinoma- stage IV     Prior hx:  T3 N1 (1 of 12 LN +ve)  Invasion into pancreas  R0 resection   hx of Pancreatoduodenectomy on  10/06/2017  Completed adjuvant treatment with single agent Capecitabine - s/p 6 cycles (12/4/2017 - 05/2018).    Local recurrence in the para-aortic soft tissue  2/2020 s/p SBRT  12/2020 Scans reviewed personally with progressive stage IV disease- L5 metastasis, growth in paraaortic mass, worsening pain and small worrisome lung nodules  CA 19-9-- 86.        Discussed that he has stage IV disease  Treatments are palliative  Reviewed in general options for systemic chemotherapy/ role of molecular testing  He will hold off on the port and resume these discussions with Dr. Robert Rodriguez on D/C        2. Lower abdominal pain  Secondary to enlarging paraaortic mass  Agree with repeat celiac plexus block, continue prn Dilaudid  3. Low back pain  Secondary to L5 metastasis  Discussed with Rad Onc and primary oncologist  Plan is to consider RFA/ Kypho with IR  Start Dexamethasone 2 mg BID  PPI  4.   Leucopenia  Monitor   Signed by: Bryce Worthington MD                     December 30, 2020

## 2020-12-31 NOTE — PROGRESS NOTES
[facility]      Subjective     The patient had a celiac plexus block yesterday. His abdominal pain is markedly improved today. He is tolerating a diet. He is hoping to go home today. Objective     Patient Vitals for the past 24 hrs:   Temp Pulse Resp BP SpO2   12/31/20 0737 97.8 °F (36.6 °C) 68 16 123/70 98 %   12/31/20 0455 98.3 °F (36.8 °C) 74 15 120/67 97 %   12/31/20 0144 97.9 °F (36.6 °C) 67 12 98/61 94 %   12/31/20 0119 98.3 °F (36.8 °C) 74 12 111/62 98 %   12/30/20 2033 97.5 °F (36.4 °C) 76 12 115/72 97 %   12/30/20 1740 97.5 °F (36.4 °C) 76 14 112/73 98 %   12/30/20 1715  76 12 104/68 96 %   12/30/20 1700  78 12 108/73 95 %   12/30/20 1645  79 12 95/75 97 %   12/30/20 1630  80 12 96/70 96 %   12/30/20 1615  81 13 97/70 95 %   12/30/20 1600  82 12 132/79 100 %   12/30/20 1545  78 14 129/83 100 %   12/30/20 1540  79 12 136/88 100 %   12/30/20 1535  78 13 (!) 141/88 100 %   12/30/20 1530  76 14 126/87 100 %   12/30/20 1525  78 14 113/79 100 %   12/30/20 1520  74 13 131/81 100 %   12/30/20 1515  73 14 124/78 100 %   12/30/20 1510  73 14 123/87 100 %   12/30/20 1505  70 16 128/81 100 %   12/30/20 1500  72 16 126/77 100 %   12/30/20 1409 99.2 °F (37.3 °C) 78 15 118/76 94 %   12/30/20 1150 98.6 °F (37 °C) 72 18 110/73 97 %       Date 12/30/20 0700 - 12/31/20 0659 12/31/20 0700 - 01/01/21 0659   Shift 4864-9856 5026-2028 24 Hour Total 2017-9694 4631-8578 24 Hour Total   INTAKE   Shift Total         OUTPUT   Urine 300  300        Urine Voided 300  300      Shift Total 300  300      NET -300  -300      Weight (kg)             PE  GEN - Awake, alert, communicating appropriately.   NAD      Labs  Recent Results (from the past 24 hour(s))   CBC WITH AUTOMATED DIFF    Collection Time: 12/31/20  4:46 AM   Result Value Ref Range    WBC 5.1 4.1 - 11.1 K/uL    RBC 4.22 4.10 - 5.70 M/uL    HGB 13.4 12.1 - 17.0 g/dL    HCT 38.9 36.6 - 50.3 %    MCV 92.2 80.0 - 99.0 FL    MCH 31.8 26.0 - 34.0 PG    MCHC 34.4 30.0 - 36.5 g/dL    RDW 12.8 11.5 - 14.5 %    PLATELET 935 (L) 621 - 400 K/uL    MPV 10.9 8.9 - 12.9 FL    NRBC 0.0 0  WBC    ABSOLUTE NRBC 0.00 0.00 - 0.01 K/uL    NEUTROPHILS 84 (H) 32 - 75 %    LYMPHOCYTES 7 (L) 12 - 49 %    MONOCYTES 7 5 - 13 %    EOSINOPHILS 1 0 - 7 %    BASOPHILS 0 0 - 1 %    IMMATURE GRANULOCYTES 1 (H) 0.0 - 0.5 %    ABS. NEUTROPHILS 4.1 1.8 - 8.0 K/UL    ABS. LYMPHOCYTES 0.4 (L) 0.8 - 3.5 K/UL    ABS. MONOCYTES 0.4 0.0 - 1.0 K/UL    ABS. EOSINOPHILS 0.1 0.0 - 0.4 K/UL    ABS. BASOPHILS 0.0 0.0 - 0.1 K/UL    ABS. IMM. GRANS. 0.1 (H) 0.00 - 0.04 K/UL    DF SMEAR SCANNED      PLATELET COMMENTS Large Platelets      RBC COMMENTS NORMOCYTIC, NORMOCHROMIC     METABOLIC PANEL, COMPREHENSIVE    Collection Time: 12/31/20  4:46 AM   Result Value Ref Range    Sodium 135 (L) 136 - 145 mmol/L    Potassium 4.0 3.5 - 5.1 mmol/L    Chloride 105 97 - 108 mmol/L    CO2 26 21 - 32 mmol/L    Anion gap 4 (L) 5 - 15 mmol/L    Glucose 167 (H) 65 - 100 mg/dL    BUN 12 6 - 20 MG/DL    Creatinine 0.67 (L) 0.70 - 1.30 MG/DL    BUN/Creatinine ratio 18 12 - 20      GFR est AA >60 >60 ml/min/1.73m2    GFR est non-AA >60 >60 ml/min/1.73m2    Calcium 8.6 8.5 - 10.1 MG/DL    Bilirubin, total 0.7 0.2 - 1.0 MG/DL    ALT (SGPT) 42 12 - 78 U/L    AST (SGOT) 24 15 - 37 U/L    Alk. phosphatase 155 (H) 45 - 117 U/L    Protein, total 6.0 (L) 6.4 - 8.2 g/dL    Albumin 3.1 (L) 3.5 - 5.0 g/dL    Globulin 2.9 2.0 - 4.0 g/dL    A-G Ratio 1.1 1.1 - 2.2           Assessment     Tanika Damon. is a 60 y. o.yr old male with metastatic cholangiocarcinoma. Plan     Agree with plans set up by Oncology and IR for management of his L5 lesion with possible ablation and kyphoplasty. He will be set up for systemic therapy as well. I will plan to set him up for a port sometime next week to be done as an outpatient to assist with this.     Maya Ortiz, MD  12/31/2020  11:02 AM

## 2020-12-31 NOTE — ROUTINE PROCESS
Bedside shift change report given to [address] (oncoming nurse) by Sherita Sampson RN (offgoing nurse). Report included the following information SBAR and Kardex.

## 2021-01-01 ENCOUNTER — PATIENT OUTREACH (OUTPATIENT)
Dept: OTHER | Age: 66
End: 2021-01-01

## 2021-01-01 ENCOUNTER — HOSPITAL ENCOUNTER (EMERGENCY)
Age: 66
Discharge: HOME | End: 2021-06-12
Attending: EMERGENCY MEDICINE | Admitting: EMERGENCY MEDICINE
Payer: COMMERCIAL

## 2021-01-01 ENCOUNTER — APPOINTMENT (OUTPATIENT)
Dept: INFUSION THERAPY | Age: 66
End: 2021-01-01

## 2021-01-01 ENCOUNTER — HOSPITAL ENCOUNTER (OUTPATIENT)
Dept: INFUSION THERAPY | Age: 66
Discharge: HOME | End: 2021-11-18
Payer: COMMERCIAL

## 2021-01-01 ENCOUNTER — HOSPITAL ENCOUNTER (OUTPATIENT)
Dept: INFUSION THERAPY | Age: 66
Discharge: HOME | End: 2021-07-22
Payer: COMMERCIAL

## 2021-01-01 ENCOUNTER — OFFICE VISIT (OUTPATIENT)
Dept: ONCOLOGY | Age: 66
End: 2021-01-01

## 2021-01-01 ENCOUNTER — HOSPITAL ENCOUNTER (OUTPATIENT)
Dept: CT IMAGING | Age: 66
Discharge: HOME | End: 2021-11-12
Attending: INTERNAL MEDICINE
Payer: COMMERCIAL

## 2021-01-01 ENCOUNTER — HOSPITAL ENCOUNTER (OUTPATIENT)
Dept: MRI IMAGING | Age: 66
Discharge: HOME | End: 2021-12-12
Attending: INTERNAL MEDICINE
Payer: COMMERCIAL

## 2021-01-01 ENCOUNTER — HOSPITAL ENCOUNTER (OUTPATIENT)
Dept: INFUSION THERAPY | Age: 66
End: 2021-01-01

## 2021-01-01 ENCOUNTER — TELEPHONE (OUTPATIENT)
Dept: PALLATIVE CARE | Age: 66
End: 2021-01-01

## 2021-01-01 ENCOUNTER — HOSPITAL ENCOUNTER (OUTPATIENT)
Dept: INFUSION THERAPY | Age: 66
Discharge: HOME | End: 2021-09-16
Payer: COMMERCIAL

## 2021-01-01 ENCOUNTER — OFFICE VISIT (OUTPATIENT)
Dept: INTERNAL MEDICINE CLINIC | Age: 66
End: 2021-01-01
Payer: COMMERCIAL

## 2021-01-01 ENCOUNTER — TELEPHONE (OUTPATIENT)
Dept: FAMILY MEDICINE CLINIC | Age: 66
End: 2021-01-01

## 2021-01-01 ENCOUNTER — HOSPITAL ENCOUNTER (EMERGENCY)
Age: 66
Discharge: HOME | End: 2021-10-15
Attending: EMERGENCY MEDICINE
Payer: COMMERCIAL

## 2021-01-01 ENCOUNTER — VIRTUAL VISIT (OUTPATIENT)
Dept: PALLATIVE CARE | Age: 66
End: 2021-01-01
Payer: COMMERCIAL

## 2021-01-01 ENCOUNTER — HOSPITAL ENCOUNTER (OUTPATIENT)
Dept: INFUSION THERAPY | Age: 66
Discharge: HOME | End: 2021-11-02
Payer: COMMERCIAL

## 2021-01-01 ENCOUNTER — PATIENT MESSAGE (OUTPATIENT)
Dept: PALLATIVE CARE | Age: 66
End: 2021-01-01

## 2021-01-01 ENCOUNTER — HOSPITAL ENCOUNTER (OUTPATIENT)
Dept: INFUSION THERAPY | Age: 66
Discharge: HOME | End: 2021-05-13
Payer: COMMERCIAL

## 2021-01-01 ENCOUNTER — HOSPITAL ENCOUNTER (OUTPATIENT)
Dept: INFUSION THERAPY | Age: 66
Discharge: HOME | End: 2021-07-29
Payer: COMMERCIAL

## 2021-01-01 ENCOUNTER — HOSPITAL ENCOUNTER (OUTPATIENT)
Dept: NON INVASIVE DIAGNOSTICS | Age: 66
Discharge: HOME | End: 2021-09-24
Payer: COMMERCIAL

## 2021-01-01 ENCOUNTER — HOSPITAL ENCOUNTER (OUTPATIENT)
Dept: NUCLEAR MEDICINE | Age: 66
Discharge: HOME | End: 2021-08-02
Attending: INTERNAL MEDICINE
Payer: COMMERCIAL

## 2021-01-01 ENCOUNTER — HOSPITAL ENCOUNTER (OUTPATIENT)
Dept: INFUSION THERAPY | Age: 66
Discharge: HOME | End: 2021-08-12
Payer: COMMERCIAL

## 2021-01-01 ENCOUNTER — HOSPITAL ENCOUNTER (OUTPATIENT)
Dept: INFUSION THERAPY | Age: 66
Discharge: HOME | End: 2021-10-21
Payer: COMMERCIAL

## 2021-01-01 ENCOUNTER — HOSPITAL ENCOUNTER (OUTPATIENT)
Dept: INTERVENTIONAL RADIOLOGY/VASCULAR | Age: 66
Discharge: HOME | End: 2021-12-23
Attending: INTERNAL MEDICINE
Payer: COMMERCIAL

## 2021-01-01 ENCOUNTER — NURSE TRIAGE (OUTPATIENT)
Dept: OTHER | Facility: CLINIC | Age: 66
End: 2021-01-01

## 2021-01-01 ENCOUNTER — TELEPHONE (OUTPATIENT)
Dept: ONCOLOGY | Age: 66
End: 2021-01-01

## 2021-01-01 ENCOUNTER — APPOINTMENT (OUTPATIENT)
Dept: CT IMAGING | Age: 66
End: 2021-01-01
Attending: EMERGENCY MEDICINE
Payer: COMMERCIAL

## 2021-01-01 ENCOUNTER — HOSPITAL ENCOUNTER (OUTPATIENT)
Dept: NUCLEAR MEDICINE | Age: 66
Discharge: HOME | End: 2021-05-12
Attending: INTERNAL MEDICINE
Payer: COMMERCIAL

## 2021-01-01 ENCOUNTER — DOCUMENTATION ONLY (OUTPATIENT)
Dept: ONCOLOGY | Age: 66
End: 2021-01-01

## 2021-01-01 ENCOUNTER — OFFICE VISIT (OUTPATIENT)
Dept: ONCOLOGY | Age: 66
End: 2021-01-01
Payer: COMMERCIAL

## 2021-01-01 ENCOUNTER — APPOINTMENT (OUTPATIENT)
Dept: GENERAL RADIOLOGY | Age: 66
End: 2021-01-01
Attending: EMERGENCY MEDICINE
Payer: COMMERCIAL

## 2021-01-01 ENCOUNTER — HOSPITAL ENCOUNTER (OUTPATIENT)
Dept: INFUSION THERAPY | Age: 66
Discharge: HOME | End: 2021-09-09
Payer: COMMERCIAL

## 2021-01-01 ENCOUNTER — HOSPITAL ENCOUNTER (OUTPATIENT)
Dept: CT IMAGING | Age: 66
Discharge: HOME | End: 2021-08-02
Attending: INTERNAL MEDICINE
Payer: COMMERCIAL

## 2021-01-01 ENCOUNTER — HOSPITAL ENCOUNTER (OUTPATIENT)
Dept: INFUSION THERAPY | Age: 66
Discharge: HOME | End: 2021-08-19
Payer: COMMERCIAL

## 2021-01-01 ENCOUNTER — HOSPITAL ENCOUNTER (OUTPATIENT)
Dept: INFUSION THERAPY | Age: 66
Discharge: HOME | End: 2021-05-06
Payer: COMMERCIAL

## 2021-01-01 ENCOUNTER — HOSPITAL ENCOUNTER (OUTPATIENT)
Dept: CT IMAGING | Age: 66
Discharge: HOME | End: 2021-05-12
Attending: INTERNAL MEDICINE
Payer: COMMERCIAL

## 2021-01-01 ENCOUNTER — APPOINTMENT (OUTPATIENT)
Dept: INFUSION THERAPY | Age: 66
End: 2021-01-01
Payer: COMMERCIAL

## 2021-01-01 ENCOUNTER — HOSPITAL ENCOUNTER (EMERGENCY)
Age: 66
Discharge: LEFT BEFORE DISCHARGE | End: 2021-10-15
Payer: COMMERCIAL

## 2021-01-01 ENCOUNTER — HOSPITAL ENCOUNTER (OUTPATIENT)
Dept: INFUSION THERAPY | Age: 66
Discharge: HOME | End: 2021-08-05
Payer: COMMERCIAL

## 2021-01-01 ENCOUNTER — HOSPITAL ENCOUNTER (OUTPATIENT)
Dept: CT IMAGING | Age: 66
Discharge: HOME | End: 2021-09-22
Attending: INTERNAL MEDICINE
Payer: COMMERCIAL

## 2021-01-01 ENCOUNTER — HOSPITAL ENCOUNTER (EMERGENCY)
Age: 66
Discharge: HOME | End: 2021-07-16
Attending: EMERGENCY MEDICINE
Payer: COMMERCIAL

## 2021-01-01 ENCOUNTER — HOSPITAL ENCOUNTER (OUTPATIENT)
Dept: INFUSION THERAPY | Age: 66
Discharge: HOME | End: 2021-05-27
Payer: COMMERCIAL

## 2021-01-01 ENCOUNTER — HOSPITAL ENCOUNTER (OUTPATIENT)
Dept: INFUSION THERAPY | Age: 66
Discharge: HOME | End: 2021-08-26
Payer: COMMERCIAL

## 2021-01-01 ENCOUNTER — HOSPITAL ENCOUNTER (OUTPATIENT)
Dept: CT IMAGING | Age: 66
End: 2021-01-01
Attending: INTERNAL MEDICINE
Payer: COMMERCIAL

## 2021-01-01 ENCOUNTER — HOSPITAL ENCOUNTER (OUTPATIENT)
Dept: NUCLEAR MEDICINE | Age: 66
Discharge: HOME | End: 2021-09-22
Attending: INTERNAL MEDICINE
Payer: COMMERCIAL

## 2021-01-01 ENCOUNTER — HOSPITAL ENCOUNTER (OUTPATIENT)
Dept: INTERVENTIONAL RADIOLOGY/VASCULAR | Age: 66
Discharge: HOME | End: 2021-12-21
Attending: INTERNAL MEDICINE | Admitting: STUDENT IN AN ORGANIZED HEALTH CARE EDUCATION/TRAINING PROGRAM

## 2021-01-01 ENCOUNTER — HOSPITAL ENCOUNTER (OUTPATIENT)
Dept: NUCLEAR MEDICINE | Age: 66
Discharge: HOME | End: 2021-11-12
Attending: INTERNAL MEDICINE
Payer: COMMERCIAL

## 2021-01-01 ENCOUNTER — HOSPITAL ENCOUNTER (OUTPATIENT)
Dept: INFUSION THERAPY | Age: 66
Discharge: HOME | End: 2021-09-23
Payer: COMMERCIAL

## 2021-01-01 ENCOUNTER — APPOINTMENT (OUTPATIENT)
Dept: CT IMAGING | Age: 66
End: 2021-01-01
Attending: INTERNAL MEDICINE
Payer: COMMERCIAL

## 2021-01-01 ENCOUNTER — HOSPITAL ENCOUNTER (OUTPATIENT)
Dept: INFUSION THERAPY | Age: 66
Discharge: HOME | End: 2021-09-02
Payer: COMMERCIAL

## 2021-01-01 ENCOUNTER — HOSPITAL ENCOUNTER (OUTPATIENT)
Dept: INFUSION THERAPY | Age: 66
Discharge: HOME | End: 2021-04-29
Payer: COMMERCIAL

## 2021-01-01 ENCOUNTER — HOSPITAL ENCOUNTER (OUTPATIENT)
Dept: INFUSION THERAPY | Age: 66
Discharge: HOME | End: 2021-06-24
Payer: COMMERCIAL

## 2021-01-01 ENCOUNTER — HOSPITAL ENCOUNTER (OUTPATIENT)
Dept: INFUSION THERAPY | Age: 66
Discharge: HOME | End: 2021-07-06
Payer: COMMERCIAL

## 2021-01-01 VITALS
HEIGHT: 68 IN | HEART RATE: 68 BPM | RESPIRATION RATE: 18 BRPM | DIASTOLIC BLOOD PRESSURE: 63 MMHG | SYSTOLIC BLOOD PRESSURE: 98 MMHG | TEMPERATURE: 96.8 F | BODY MASS INDEX: 24.66 KG/M2 | OXYGEN SATURATION: 94 % | WEIGHT: 162.7 LBS

## 2021-01-01 VITALS
HEIGHT: 68 IN | HEART RATE: 86 BPM | WEIGHT: 158.95 LBS | RESPIRATION RATE: 14 BRPM | BODY MASS INDEX: 24.09 KG/M2 | DIASTOLIC BLOOD PRESSURE: 63 MMHG | OXYGEN SATURATION: 98 % | TEMPERATURE: 99.1 F | SYSTOLIC BLOOD PRESSURE: 114 MMHG

## 2021-01-01 VITALS
DIASTOLIC BLOOD PRESSURE: 90 MMHG | TEMPERATURE: 97.9 F | HEIGHT: 68 IN | BODY MASS INDEX: 24.12 KG/M2 | RESPIRATION RATE: 16 BRPM | OXYGEN SATURATION: 99 % | HEART RATE: 67 BPM | SYSTOLIC BLOOD PRESSURE: 145 MMHG | WEIGHT: 159.17 LBS

## 2021-01-01 VITALS
WEIGHT: 159.1 LBS | DIASTOLIC BLOOD PRESSURE: 62 MMHG | BODY MASS INDEX: 24.11 KG/M2 | SYSTOLIC BLOOD PRESSURE: 104 MMHG | RESPIRATION RATE: 16 BRPM | TEMPERATURE: 97.9 F | HEART RATE: 70 BPM | OXYGEN SATURATION: 98 % | HEIGHT: 68 IN

## 2021-01-01 VITALS
WEIGHT: 161 LBS | HEIGHT: 68 IN | SYSTOLIC BLOOD PRESSURE: 100 MMHG | DIASTOLIC BLOOD PRESSURE: 62 MMHG | HEART RATE: 80 BPM | RESPIRATION RATE: 18 BRPM | BODY MASS INDEX: 24.4 KG/M2 | OXYGEN SATURATION: 97 % | TEMPERATURE: 98.5 F

## 2021-01-01 VITALS
RESPIRATION RATE: 18 BRPM | TEMPERATURE: 98.1 F | HEART RATE: 82 BPM | OXYGEN SATURATION: 99 % | DIASTOLIC BLOOD PRESSURE: 70 MMHG | BODY MASS INDEX: 23.58 KG/M2 | WEIGHT: 155.1 LBS | SYSTOLIC BLOOD PRESSURE: 122 MMHG

## 2021-01-01 VITALS
TEMPERATURE: 98.1 F | BODY MASS INDEX: 23.78 KG/M2 | SYSTOLIC BLOOD PRESSURE: 118 MMHG | RESPIRATION RATE: 18 BRPM | SYSTOLIC BLOOD PRESSURE: 113 MMHG | RESPIRATION RATE: 16 BRPM | BODY MASS INDEX: 23.4 KG/M2 | WEIGHT: 153.9 LBS | HEART RATE: 76 BPM | TEMPERATURE: 98.8 F | HEART RATE: 96 BPM | OXYGEN SATURATION: 97 % | OXYGEN SATURATION: 98 % | DIASTOLIC BLOOD PRESSURE: 68 MMHG | DIASTOLIC BLOOD PRESSURE: 68 MMHG | WEIGHT: 156.4 LBS

## 2021-01-01 VITALS
BODY MASS INDEX: 23.04 KG/M2 | DIASTOLIC BLOOD PRESSURE: 66 MMHG | WEIGHT: 152 LBS | SYSTOLIC BLOOD PRESSURE: 105 MMHG | TEMPERATURE: 98.9 F | RESPIRATION RATE: 16 BRPM | OXYGEN SATURATION: 99 % | HEART RATE: 75 BPM | HEIGHT: 68 IN

## 2021-01-01 VITALS
BODY MASS INDEX: 23.78 KG/M2 | DIASTOLIC BLOOD PRESSURE: 63 MMHG | OXYGEN SATURATION: 100 % | WEIGHT: 156.9 LBS | HEIGHT: 68 IN | TEMPERATURE: 98 F | SYSTOLIC BLOOD PRESSURE: 107 MMHG | HEART RATE: 71 BPM | RESPIRATION RATE: 16 BRPM

## 2021-01-01 VITALS
OXYGEN SATURATION: 97 % | BODY MASS INDEX: 24.12 KG/M2 | RESPIRATION RATE: 18 BRPM | WEIGHT: 159.17 LBS | TEMPERATURE: 97.9 F | HEART RATE: 63 BPM | SYSTOLIC BLOOD PRESSURE: 105 MMHG | HEIGHT: 68 IN | DIASTOLIC BLOOD PRESSURE: 66 MMHG

## 2021-01-01 VITALS
BODY MASS INDEX: 23.34 KG/M2 | TEMPERATURE: 97.9 F | SYSTOLIC BLOOD PRESSURE: 95 MMHG | HEIGHT: 68 IN | HEART RATE: 73 BPM | DIASTOLIC BLOOD PRESSURE: 59 MMHG | OXYGEN SATURATION: 100 % | RESPIRATION RATE: 18 BRPM | WEIGHT: 154 LBS

## 2021-01-01 VITALS
BODY MASS INDEX: 23.01 KG/M2 | TEMPERATURE: 98.2 F | WEIGHT: 151.8 LBS | SYSTOLIC BLOOD PRESSURE: 100 MMHG | HEART RATE: 75 BPM | DIASTOLIC BLOOD PRESSURE: 58 MMHG | OXYGEN SATURATION: 99 % | HEIGHT: 68 IN

## 2021-01-01 VITALS
DIASTOLIC BLOOD PRESSURE: 64 MMHG | BODY MASS INDEX: 23.54 KG/M2 | WEIGHT: 154.8 LBS | TEMPERATURE: 98.2 F | HEART RATE: 76 BPM | SYSTOLIC BLOOD PRESSURE: 105 MMHG | RESPIRATION RATE: 16 BRPM

## 2021-01-01 VITALS
BODY MASS INDEX: 23.37 KG/M2 | DIASTOLIC BLOOD PRESSURE: 63 MMHG | OXYGEN SATURATION: 98 % | WEIGHT: 154.2 LBS | SYSTOLIC BLOOD PRESSURE: 108 MMHG | HEIGHT: 68 IN | HEART RATE: 74 BPM | TEMPERATURE: 98.8 F | RESPIRATION RATE: 16 BRPM

## 2021-01-01 VITALS
DIASTOLIC BLOOD PRESSURE: 54 MMHG | WEIGHT: 158.2 LBS | SYSTOLIC BLOOD PRESSURE: 102 MMHG | TEMPERATURE: 97.9 F | HEIGHT: 68 IN | HEART RATE: 78 BPM | RESPIRATION RATE: 18 BRPM | OXYGEN SATURATION: 96 % | BODY MASS INDEX: 23.98 KG/M2

## 2021-01-01 VITALS
SYSTOLIC BLOOD PRESSURE: 111 MMHG | HEIGHT: 68 IN | RESPIRATION RATE: 16 BRPM | DIASTOLIC BLOOD PRESSURE: 66 MMHG | BODY MASS INDEX: 21.98 KG/M2 | HEART RATE: 70 BPM | OXYGEN SATURATION: 98 % | WEIGHT: 145 LBS

## 2021-01-01 VITALS
TEMPERATURE: 98.2 F | SYSTOLIC BLOOD PRESSURE: 116 MMHG | OXYGEN SATURATION: 94 % | HEIGHT: 68 IN | BODY MASS INDEX: 24.37 KG/M2 | WEIGHT: 160.8 LBS | DIASTOLIC BLOOD PRESSURE: 72 MMHG | HEART RATE: 74 BPM

## 2021-01-01 VITALS
HEART RATE: 75 BPM | HEIGHT: 68 IN | SYSTOLIC BLOOD PRESSURE: 102 MMHG | WEIGHT: 157 LBS | RESPIRATION RATE: 16 BRPM | BODY MASS INDEX: 23.79 KG/M2 | DIASTOLIC BLOOD PRESSURE: 68 MMHG | TEMPERATURE: 98 F

## 2021-01-01 VITALS
RESPIRATION RATE: 16 BRPM | DIASTOLIC BLOOD PRESSURE: 61 MMHG | BODY MASS INDEX: 24.42 KG/M2 | SYSTOLIC BLOOD PRESSURE: 99 MMHG | HEART RATE: 71 BPM | WEIGHT: 160.6 LBS | OXYGEN SATURATION: 98 % | TEMPERATURE: 98.1 F

## 2021-01-01 VITALS
OXYGEN SATURATION: 96 % | BODY MASS INDEX: 23.13 KG/M2 | TEMPERATURE: 98.2 F | DIASTOLIC BLOOD PRESSURE: 63 MMHG | WEIGHT: 152.6 LBS | SYSTOLIC BLOOD PRESSURE: 111 MMHG | HEART RATE: 85 BPM | HEIGHT: 68 IN

## 2021-01-01 VITALS
WEIGHT: 159.6 LBS | HEIGHT: 68 IN | TEMPERATURE: 98.1 F | BODY MASS INDEX: 24.19 KG/M2 | OXYGEN SATURATION: 100 % | SYSTOLIC BLOOD PRESSURE: 101 MMHG | RESPIRATION RATE: 16 BRPM | DIASTOLIC BLOOD PRESSURE: 63 MMHG | HEART RATE: 59 BPM

## 2021-01-01 VITALS
WEIGHT: 152.3 LBS | HEART RATE: 64 BPM | DIASTOLIC BLOOD PRESSURE: 69 MMHG | TEMPERATURE: 98.1 F | BODY MASS INDEX: 23.08 KG/M2 | RESPIRATION RATE: 16 BRPM | OXYGEN SATURATION: 100 % | HEIGHT: 68 IN | SYSTOLIC BLOOD PRESSURE: 111 MMHG

## 2021-01-01 VITALS
RESPIRATION RATE: 18 BRPM | TEMPERATURE: 98.1 F | DIASTOLIC BLOOD PRESSURE: 66 MMHG | SYSTOLIC BLOOD PRESSURE: 108 MMHG | HEART RATE: 87 BPM

## 2021-01-01 VITALS
DIASTOLIC BLOOD PRESSURE: 61 MMHG | TEMPERATURE: 98 F | HEART RATE: 75 BPM | HEIGHT: 68 IN | RESPIRATION RATE: 16 BRPM | WEIGHT: 156 LBS | OXYGEN SATURATION: 100 % | SYSTOLIC BLOOD PRESSURE: 102 MMHG | BODY MASS INDEX: 23.64 KG/M2

## 2021-01-01 VITALS
OXYGEN SATURATION: 96 % | DIASTOLIC BLOOD PRESSURE: 62 MMHG | TEMPERATURE: 98.3 F | WEIGHT: 160.6 LBS | SYSTOLIC BLOOD PRESSURE: 101 MMHG | RESPIRATION RATE: 18 BRPM | HEIGHT: 68 IN | HEART RATE: 73 BPM | BODY MASS INDEX: 24.34 KG/M2

## 2021-01-01 VITALS
DIASTOLIC BLOOD PRESSURE: 66 MMHG | HEIGHT: 68 IN | HEART RATE: 75 BPM | RESPIRATION RATE: 16 BRPM | WEIGHT: 152.1 LBS | BODY MASS INDEX: 23.05 KG/M2 | SYSTOLIC BLOOD PRESSURE: 105 MMHG | OXYGEN SATURATION: 99 % | TEMPERATURE: 98.9 F

## 2021-01-01 VITALS
DIASTOLIC BLOOD PRESSURE: 83 MMHG | BODY MASS INDEX: 23.15 KG/M2 | SYSTOLIC BLOOD PRESSURE: 106 MMHG | HEIGHT: 68 IN | OXYGEN SATURATION: 97 % | WEIGHT: 152.78 LBS | HEART RATE: 63 BPM | RESPIRATION RATE: 15 BRPM | TEMPERATURE: 98.6 F

## 2021-01-01 VITALS
RESPIRATION RATE: 16 BRPM | BODY MASS INDEX: 24.96 KG/M2 | TEMPERATURE: 98 F | WEIGHT: 164.7 LBS | SYSTOLIC BLOOD PRESSURE: 111 MMHG | HEIGHT: 68 IN | HEART RATE: 63 BPM | DIASTOLIC BLOOD PRESSURE: 65 MMHG

## 2021-01-01 VITALS
DIASTOLIC BLOOD PRESSURE: 61 MMHG | SYSTOLIC BLOOD PRESSURE: 98 MMHG | BODY MASS INDEX: 24.1 KG/M2 | HEART RATE: 72 BPM | TEMPERATURE: 98.1 F | OXYGEN SATURATION: 100 % | WEIGHT: 159 LBS | HEIGHT: 68 IN | RESPIRATION RATE: 16 BRPM

## 2021-01-01 VITALS
WEIGHT: 154.6 LBS | DIASTOLIC BLOOD PRESSURE: 64 MMHG | TEMPERATURE: 97.9 F | SYSTOLIC BLOOD PRESSURE: 102 MMHG | OXYGEN SATURATION: 100 % | BODY MASS INDEX: 23.43 KG/M2 | HEART RATE: 64 BPM | RESPIRATION RATE: 18 BRPM | HEIGHT: 68 IN

## 2021-01-01 VITALS
HEART RATE: 69 BPM | OXYGEN SATURATION: 98 % | BODY MASS INDEX: 23.64 KG/M2 | WEIGHT: 156 LBS | SYSTOLIC BLOOD PRESSURE: 106 MMHG | HEIGHT: 68 IN | RESPIRATION RATE: 16 BRPM | TEMPERATURE: 97.9 F | DIASTOLIC BLOOD PRESSURE: 64 MMHG

## 2021-01-01 VITALS
TEMPERATURE: 98 F | HEART RATE: 75 BPM | BODY MASS INDEX: 23.79 KG/M2 | HEIGHT: 68 IN | SYSTOLIC BLOOD PRESSURE: 102 MMHG | DIASTOLIC BLOOD PRESSURE: 68 MMHG | RESPIRATION RATE: 16 BRPM | WEIGHT: 157 LBS

## 2021-01-01 VITALS — OXYGEN SATURATION: 95 %

## 2021-01-01 DIAGNOSIS — C22.1 CHOLANGIOCARCINOMA OF BILIARY TRACT (HCC): Primary | ICD-10-CM

## 2021-01-01 DIAGNOSIS — R10.9 INTRACTABLE ABDOMINAL PAIN: Primary | ICD-10-CM

## 2021-01-01 DIAGNOSIS — M54.9 INTRACTABLE BACK PAIN: Primary | ICD-10-CM

## 2021-01-01 DIAGNOSIS — R19.7 DIARRHEA DUE TO MALABSORPTION: ICD-10-CM

## 2021-01-01 DIAGNOSIS — R10.10 PAIN OF UPPER ABDOMEN: ICD-10-CM

## 2021-01-01 DIAGNOSIS — R10.30 LOWER ABDOMINAL PAIN: ICD-10-CM

## 2021-01-01 DIAGNOSIS — C79.51 METASTATIC CANCER TO BONE (HCC): ICD-10-CM

## 2021-01-01 DIAGNOSIS — R53.0 NEOPLASTIC MALIGNANT RELATED FATIGUE: ICD-10-CM

## 2021-01-01 DIAGNOSIS — M54.9 BACK PAIN, UNSPECIFIED BACK LOCATION, UNSPECIFIED BACK PAIN LATERALITY, UNSPECIFIED CHRONICITY: ICD-10-CM

## 2021-01-01 DIAGNOSIS — R63.0 ANOREXIA: ICD-10-CM

## 2021-01-01 DIAGNOSIS — T45.1X5A CHEMOTHERAPY INDUCED DIARRHEA: ICD-10-CM

## 2021-01-01 DIAGNOSIS — C22.1 CHOLANGIOCARCINOMA OF BILIARY TRACT (HCC): ICD-10-CM

## 2021-01-01 DIAGNOSIS — C78.00 MALIGNANT NEOPLASM METASTATIC TO LUNG, UNSPECIFIED LATERALITY (HCC): Primary | ICD-10-CM

## 2021-01-01 DIAGNOSIS — K90.9 DIARRHEA DUE TO MALABSORPTION: ICD-10-CM

## 2021-01-01 DIAGNOSIS — C78.00 MALIGNANT NEOPLASM METASTATIC TO LUNG, UNSPECIFIED LATERALITY (HCC): ICD-10-CM

## 2021-01-01 DIAGNOSIS — I82.3 RENAL VEIN THROMBOSIS (HCC): ICD-10-CM

## 2021-01-01 DIAGNOSIS — E11.69 HYPERLIPIDEMIA ASSOCIATED WITH TYPE 2 DIABETES MELLITUS (HCC): ICD-10-CM

## 2021-01-01 DIAGNOSIS — G89.3 PAIN FROM BONE METASTASES (HCC): ICD-10-CM

## 2021-01-01 DIAGNOSIS — G89.3 CANCER RELATED PAIN: ICD-10-CM

## 2021-01-01 DIAGNOSIS — R10.9 INTRACTABLE ABDOMINAL PAIN: ICD-10-CM

## 2021-01-01 DIAGNOSIS — C79.51 PAIN FROM BONE METASTASES (HCC): ICD-10-CM

## 2021-01-01 DIAGNOSIS — C24.9 CHOLANGIOCARCINOMA DETERMINED BY BIOPSY OF BILIARY TRACT (HCC): Primary | ICD-10-CM

## 2021-01-01 DIAGNOSIS — R25.2 MUSCLE CRAMPS: Primary | ICD-10-CM

## 2021-01-01 DIAGNOSIS — G62.9 SENSORY NEUROPATHY: ICD-10-CM

## 2021-01-01 DIAGNOSIS — R11.2 NAUSEA AND VOMITING, INTRACTABILITY OF VOMITING NOT SPECIFIED, UNSPECIFIED VOMITING TYPE: ICD-10-CM

## 2021-01-01 DIAGNOSIS — Z00.00 WELCOME TO MEDICARE PREVENTIVE VISIT: Primary | ICD-10-CM

## 2021-01-01 DIAGNOSIS — K59.1 FUNCTIONAL DIARRHEA: ICD-10-CM

## 2021-01-01 DIAGNOSIS — R05.9 COUGH: ICD-10-CM

## 2021-01-01 DIAGNOSIS — I48.91 ATRIAL FIBRILLATION, UNSPECIFIED TYPE (HCC): ICD-10-CM

## 2021-01-01 DIAGNOSIS — R11.2 NON-INTRACTABLE VOMITING WITH NAUSEA, UNSPECIFIED VOMITING TYPE: ICD-10-CM

## 2021-01-01 DIAGNOSIS — J31.0 RHINITIS, CHRONIC: Primary | ICD-10-CM

## 2021-01-01 DIAGNOSIS — E53.8 B12 DEFICIENCY: ICD-10-CM

## 2021-01-01 DIAGNOSIS — F11.99 OPIOID USE, UNSPECIFIED WITH UNSPECIFIED OPIOID-INDUCED DISORDER (HCC): ICD-10-CM

## 2021-01-01 DIAGNOSIS — I48.0 PAROXYSMAL ATRIAL FIBRILLATION (HCC): ICD-10-CM

## 2021-01-01 DIAGNOSIS — K59.1 FUNCTIONAL DIARRHEA: Primary | ICD-10-CM

## 2021-01-01 DIAGNOSIS — G89.3 CANCER ASSOCIATED PAIN: ICD-10-CM

## 2021-01-01 DIAGNOSIS — K59.00 CONSTIPATION, UNSPECIFIED CONSTIPATION TYPE: Primary | ICD-10-CM

## 2021-01-01 DIAGNOSIS — C22.1 CHOLANGIOCARCINOMA (HCC): ICD-10-CM

## 2021-01-01 DIAGNOSIS — M54.59 INTRACTABLE LOW BACK PAIN: ICD-10-CM

## 2021-01-01 DIAGNOSIS — Z09 CHEMOTHERAPY FOLLOW-UP EXAMINATION: ICD-10-CM

## 2021-01-01 DIAGNOSIS — E56.0 VITAMIN E DEFICIENCY: ICD-10-CM

## 2021-01-01 DIAGNOSIS — R93.5 ABNORMAL CT OF THE ABDOMEN: ICD-10-CM

## 2021-01-01 DIAGNOSIS — K52.9 CHRONIC DIARRHEA: ICD-10-CM

## 2021-01-01 DIAGNOSIS — M54.50 ACUTE LEFT-SIDED LOW BACK PAIN WITHOUT SCIATICA: ICD-10-CM

## 2021-01-01 DIAGNOSIS — E50.9 VITAMIN A DEFICIENCY: ICD-10-CM

## 2021-01-01 DIAGNOSIS — K52.1 CHEMOTHERAPY INDUCED DIARRHEA: ICD-10-CM

## 2021-01-01 DIAGNOSIS — K85.90 ACUTE PANCREATITIS WITHOUT INFECTION OR NECROSIS, UNSPECIFIED PANCREATITIS TYPE: ICD-10-CM

## 2021-01-01 DIAGNOSIS — R35.1 BENIGN PROSTATIC HYPERPLASIA WITH NOCTURIA: ICD-10-CM

## 2021-01-01 DIAGNOSIS — R50.9 FEVER IN ADULT: Primary | ICD-10-CM

## 2021-01-01 DIAGNOSIS — N40.1 BENIGN PROSTATIC HYPERPLASIA WITH NOCTURIA: ICD-10-CM

## 2021-01-01 DIAGNOSIS — C79.51 METASTATIC CANCER TO BONE (HCC): Primary | ICD-10-CM

## 2021-01-01 DIAGNOSIS — E56.9 VITAMIN DEFICIENCY: ICD-10-CM

## 2021-01-01 DIAGNOSIS — E78.5 HYPERLIPIDEMIA ASSOCIATED WITH TYPE 2 DIABETES MELLITUS (HCC): ICD-10-CM

## 2021-01-01 DIAGNOSIS — E11.65 TYPE 2 DIABETES MELLITUS WITH HYPERGLYCEMIA, WITHOUT LONG-TERM CURRENT USE OF INSULIN (HCC): ICD-10-CM

## 2021-01-01 DIAGNOSIS — E83.51 HYPOCALCEMIA: ICD-10-CM

## 2021-01-01 DIAGNOSIS — M16.0 PRIMARY OSTEOARTHRITIS OF BOTH HIPS: Primary | ICD-10-CM

## 2021-01-01 DIAGNOSIS — R10.13 ABDOMINAL PAIN, EPIGASTRIC: ICD-10-CM

## 2021-01-01 DIAGNOSIS — C24.9 CHOLANGIOCARCINOMA DETERMINED BY BIOPSY OF BILIARY TRACT (HCC): ICD-10-CM

## 2021-01-01 LAB
A-TOCOPHEROL VIT E SERPL-MCNC: ABNORMAL MG/L
ALBUMIN SERPL-MCNC: 2.9 G/DL (ref 3.5–5)
ALBUMIN SERPL-MCNC: 3 G/DL (ref 3.5–5)
ALBUMIN SERPL-MCNC: 3.1 G/DL (ref 3.5–5)
ALBUMIN SERPL-MCNC: 3.3 G/DL (ref 3.5–5)
ALBUMIN SERPL-MCNC: 3.4 G/DL (ref 3.5–5)
ALBUMIN SERPL-MCNC: 3.5 G/DL (ref 3.5–5)
ALBUMIN SERPL-MCNC: 3.6 G/DL (ref 3.5–5)
ALBUMIN/GLOB SERPL: 1 {RATIO} (ref 1.1–2.2)
ALBUMIN/GLOB SERPL: 1 {RATIO} (ref 1.1–2.2)
ALBUMIN/GLOB SERPL: 1.1 {RATIO} (ref 1.1–2.2)
ALBUMIN/GLOB SERPL: 1.2 {RATIO} (ref 1.1–2.2)
ALP SERPL-CCNC: 132 U/L (ref 45–117)
ALP SERPL-CCNC: 152 U/L (ref 45–117)
ALP SERPL-CCNC: 157 U/L (ref 45–117)
ALP SERPL-CCNC: 176 U/L (ref 45–117)
ALP SERPL-CCNC: 191 U/L (ref 45–117)
ALP SERPL-CCNC: 192 U/L (ref 45–117)
ALP SERPL-CCNC: 193 U/L (ref 45–117)
ALP SERPL-CCNC: 200 U/L (ref 45–117)
ALP SERPL-CCNC: 202 U/L (ref 45–117)
ALP SERPL-CCNC: 209 U/L (ref 45–117)
ALP SERPL-CCNC: 210 U/L (ref 45–117)
ALP SERPL-CCNC: 258 U/L (ref 45–117)
ALT SERPL-CCNC: 126 U/L (ref 12–78)
ALT SERPL-CCNC: 22 U/L (ref 12–78)
ALT SERPL-CCNC: 29 U/L (ref 12–78)
ALT SERPL-CCNC: 30 U/L (ref 12–78)
ALT SERPL-CCNC: 43 U/L (ref 12–78)
ALT SERPL-CCNC: 52 U/L (ref 12–78)
ALT SERPL-CCNC: 54 U/L (ref 12–78)
ALT SERPL-CCNC: 54 U/L (ref 12–78)
ALT SERPL-CCNC: 61 U/L (ref 12–78)
ALT SERPL-CCNC: 67 U/L (ref 12–78)
ALT SERPL-CCNC: 86 U/L (ref 12–78)
ALT SERPL-CCNC: 92 U/L (ref 12–78)
ANION GAP BLD CALC-SCNC: 11 MMOL/L (ref 10–20)
ANION GAP BLD CALC-SCNC: 11 MMOL/L (ref 10–20)
ANION GAP BLD CALC-SCNC: 12 MMOL/L (ref 10–20)
ANION GAP BLD CALC-SCNC: 13 MMOL/L (ref 10–20)
ANION GAP BLD CALC-SCNC: 15 MMOL/L (ref 10–20)
ANION GAP BLD CALC-SCNC: 6 MMOL/L (ref 10–20)
ANION GAP SERPL CALC-SCNC: 3 MMOL/L (ref 5–15)
ANION GAP SERPL CALC-SCNC: 4 MMOL/L (ref 5–15)
ANION GAP SERPL CALC-SCNC: 4 MMOL/L (ref 5–15)
ANION GAP SERPL CALC-SCNC: 5 MMOL/L (ref 5–15)
ANION GAP SERPL CALC-SCNC: 6 MMOL/L (ref 5–15)
ANION GAP SERPL CALC-SCNC: 8 MMOL/L (ref 5–15)
APPEARANCE UR: CLEAR
APPEARANCE UR: CLEAR
AST SERPL-CCNC: 14 U/L (ref 15–37)
AST SERPL-CCNC: 15 U/L (ref 15–37)
AST SERPL-CCNC: 20 U/L (ref 15–37)
AST SERPL-CCNC: 21 U/L (ref 15–37)
AST SERPL-CCNC: 29 U/L (ref 15–37)
AST SERPL-CCNC: 29 U/L (ref 15–37)
AST SERPL-CCNC: 30 U/L (ref 15–37)
AST SERPL-CCNC: 31 U/L (ref 15–37)
AST SERPL-CCNC: 57 U/L (ref 15–37)
AST SERPL-CCNC: 61 U/L (ref 15–37)
AST SERPL-CCNC: 64 U/L (ref 15–37)
AST SERPL-CCNC: 74 U/L (ref 15–37)
ATRIAL RATE: 68 BPM
BACTERIA SPEC CULT: ABNORMAL
BACTERIA SPEC CULT: ABNORMAL
BACTERIA SPEC CULT: NORMAL
BACTERIA URNS QL MICRO: NEGATIVE /HPF
BACTERIA URNS QL MICRO: NEGATIVE /HPF
BASE DEFICIT BLD-SCNC: 1.8 MMOL/L
BASO+EOS+MONOS # BLD AUTO: 0.3 K/UL (ref 0.2–1.2)
BASO+EOS+MONOS # BLD AUTO: 0.4 K/UL (ref 0.2–1.2)
BASO+EOS+MONOS # BLD AUTO: 0.7 K/UL (ref 0.2–1.2)
BASO+EOS+MONOS # BLD AUTO: 0.7 K/UL (ref 0.2–1.2)
BASO+EOS+MONOS NFR BLD AUTO: 15 % (ref 3.2–16.9)
BASO+EOS+MONOS NFR BLD AUTO: 16 % (ref 3.2–16.9)
BASO+EOS+MONOS NFR BLD AUTO: 17 % (ref 3.2–16.9)
BASO+EOS+MONOS NFR BLD AUTO: 8 % (ref 3.2–16.9)
BASOPHILS # BLD: 0 K/UL (ref 0–0.1)
BASOPHILS # BLD: 0.1 K/UL (ref 0–0.1)
BASOPHILS NFR BLD: 0 % (ref 0–1)
BASOPHILS NFR BLD: 1 % (ref 0–1)
BASOPHILS NFR BLD: 1 % (ref 0–1)
BILIRUB DIRECT SERPL-MCNC: 0.3 MG/DL (ref 0–0.2)
BILIRUB SERPL-MCNC: 0.4 MG/DL (ref 0.2–1)
BILIRUB SERPL-MCNC: 0.5 MG/DL (ref 0.2–1)
BILIRUB SERPL-MCNC: 0.6 MG/DL (ref 0.2–1)
BILIRUB SERPL-MCNC: 0.8 MG/DL (ref 0.2–1)
BILIRUB SERPL-MCNC: 0.8 MG/DL (ref 0.2–1)
BILIRUB SERPL-MCNC: 0.9 MG/DL (ref 0.2–1)
BILIRUB SERPL-MCNC: 0.9 MG/DL (ref 0.2–1)
BILIRUB SERPL-MCNC: 1.4 MG/DL (ref 0.2–1)
BILIRUB UR QL: NEGATIVE
BILIRUB UR QL: NEGATIVE
BUN BLD-MCNC: 9 MG/DL (ref 9–20)
BUN SERPL-MCNC: 10 MG/DL (ref 6–20)
BUN SERPL-MCNC: 13 MG/DL (ref 6–20)
BUN SERPL-MCNC: 14 MG/DL (ref 6–20)
BUN SERPL-MCNC: 16 MG/DL (ref 6–20)
BUN SERPL-MCNC: 18 MG/DL (ref 6–20)
BUN SERPL-MCNC: 18 MG/DL (ref 6–20)
BUN SERPL-MCNC: 19 MG/DL (ref 6–20)
BUN SERPL-MCNC: 19 MG/DL (ref 6–20)
BUN SERPL-MCNC: 20 MG/DL (ref 6–20)
BUN SERPL-MCNC: 22 MG/DL (ref 6–20)
BUN/CREAT SERPL: 12 (ref 12–20)
BUN/CREAT SERPL: 16 (ref 12–20)
BUN/CREAT SERPL: 20 (ref 12–20)
BUN/CREAT SERPL: 21 (ref 12–20)
BUN/CREAT SERPL: 26 (ref 12–20)
BUN/CREAT SERPL: 27 (ref 12–20)
BUN/CREAT SERPL: 27 (ref 12–20)
BUN/CREAT SERPL: 28 (ref 12–20)
CA-I BLD-MCNC: 1.07 MMOL/L (ref 1.12–1.32)
CA-I BLD-MCNC: 1.12 MMOL/L (ref 1.12–1.32)
CA-I BLD-MCNC: 1.14 MMOL/L (ref 1.12–1.32)
CA-I BLD-MCNC: 1.16 MMOL/L (ref 1.12–1.32)
CA-I BLD-MCNC: 1.19 MMOL/L (ref 1.12–1.32)
CA-I BLD-MCNC: 1.2 MMOL/L (ref 1.12–1.32)
CALCIUM SERPL-MCNC: 7.7 MG/DL (ref 8.5–10.1)
CALCIUM SERPL-MCNC: 7.8 MG/DL (ref 8.5–10.1)
CALCIUM SERPL-MCNC: 7.9 MG/DL (ref 8.5–10.1)
CALCIUM SERPL-MCNC: 8.1 MG/DL (ref 8.5–10.1)
CALCIUM SERPL-MCNC: 8.4 MG/DL (ref 8.5–10.1)
CALCIUM SERPL-MCNC: 8.4 MG/DL (ref 8.5–10.1)
CALCIUM SERPL-MCNC: 8.5 MG/DL (ref 8.5–10.1)
CALCIUM SERPL-MCNC: 8.9 MG/DL (ref 8.5–10.1)
CALCULATED P AXIS, ECG09: 88 DEGREES
CALCULATED R AXIS, ECG10: 2 DEGREES
CALCULATED T AXIS, ECG11: 9 DEGREES
CANCER AG19-9 SERPL-ACNC: 26 U/ML (ref 0–35)
CANCER AG19-9 SERPL-ACNC: 29 U/ML (ref 0–35)
CANCER AG19-9 SERPL-ACNC: 30 U/ML (ref 0–35)
CANCER AG19-9 SERPL-ACNC: 34 U/ML (ref 0–35)
CANCER AG19-9 SERPL-ACNC: 36 U/ML (ref 0–35)
CANCER AG19-9 SERPL-ACNC: 58 U/ML (ref 0–35)
CHLORIDE BLD-SCNC: 100 MMOL/L (ref 98–107)
CHLORIDE BLD-SCNC: 102 MMOL/L (ref 98–107)
CHLORIDE BLD-SCNC: 102 MMOL/L (ref 98–107)
CHLORIDE BLD-SCNC: 106 MMOL/L (ref 98–107)
CHLORIDE BLD-SCNC: 108 MMOL/L (ref 98–107)
CHLORIDE BLD-SCNC: 108 MMOL/L (ref 98–107)
CHLORIDE SERPL-SCNC: 105 MMOL/L (ref 97–108)
CHLORIDE SERPL-SCNC: 106 MMOL/L (ref 97–108)
CHLORIDE SERPL-SCNC: 106 MMOL/L (ref 97–108)
CHLORIDE SERPL-SCNC: 107 MMOL/L (ref 97–108)
CHLORIDE SERPL-SCNC: 108 MMOL/L (ref 97–108)
CHLORIDE SERPL-SCNC: 108 MMOL/L (ref 97–108)
CHLORIDE SERPL-SCNC: 109 MMOL/L (ref 97–108)
CHLORIDE SERPL-SCNC: 110 MMOL/L (ref 97–108)
CHLORIDE SERPL-SCNC: 112 MMOL/L (ref 97–108)
CHOLEST SERPL-MCNC: 82 MG/DL
CK SERPL-CCNC: 33 U/L (ref 39–308)
CO2 BLD-SCNC: 21.7 MMOL/L (ref 21–32)
CO2 BLD-SCNC: 22 MMOL/L (ref 21–32)
CO2 BLD-SCNC: 23.8 MMOL/L (ref 21–32)
CO2 BLD-SCNC: 24.9 MMOL/L (ref 21–32)
CO2 BLD-SCNC: 25.2 MMOL/L (ref 21–32)
CO2 BLD-SCNC: 26.1 MMOL/L (ref 21–32)
CO2 SERPL-SCNC: 20 MMOL/L (ref 21–32)
CO2 SERPL-SCNC: 22 MMOL/L (ref 21–32)
CO2 SERPL-SCNC: 24 MMOL/L (ref 21–32)
CO2 SERPL-SCNC: 25 MMOL/L (ref 21–32)
CO2 SERPL-SCNC: 26 MMOL/L (ref 21–32)
CO2 SERPL-SCNC: 26 MMOL/L (ref 21–32)
CO2 SERPL-SCNC: 27 MMOL/L (ref 21–32)
COLOR UR: ABNORMAL
COLOR UR: ABNORMAL
CREAT BLD-MCNC: 0.6 MG/DL (ref 0.6–1.3)
CREAT BLD-MCNC: 0.67 MG/DL (ref 0.6–1.3)
CREAT BLD-MCNC: 0.72 MG/DL (ref 0.6–1.3)
CREAT BLD-MCNC: 0.76 MG/DL (ref 0.6–1.3)
CREAT BLD-MCNC: 0.82 MG/DL (ref 0.6–1.3)
CREAT BLD-MCNC: 0.91 MG/DL (ref 0.6–1.3)
CREAT SERPL-MCNC: 0.66 MG/DL (ref 0.7–1.3)
CREAT SERPL-MCNC: 0.7 MG/DL (ref 0.7–1.3)
CREAT SERPL-MCNC: 0.71 MG/DL (ref 0.7–1.3)
CREAT SERPL-MCNC: 0.71 MG/DL (ref 0.7–1.3)
CREAT SERPL-MCNC: 0.73 MG/DL (ref 0.7–1.3)
CREAT SERPL-MCNC: 0.74 MG/DL (ref 0.7–1.3)
CREAT SERPL-MCNC: 0.75 MG/DL (ref 0.7–1.3)
CREAT SERPL-MCNC: 0.8 MG/DL (ref 0.7–1.3)
CREAT SERPL-MCNC: 0.83 MG/DL (ref 0.7–1.3)
CREAT SERPL-MCNC: 0.83 MG/DL (ref 0.7–1.3)
CREAT UR-MCNC: 85.1 MG/DL
DIAGNOSIS, 93000: NORMAL
DIFFERENTIAL METHOD BLD: ABNORMAL
EOSINOPHIL # BLD: 0.1 K/UL (ref 0–0.4)
EOSINOPHIL # BLD: 0.2 K/UL (ref 0–0.4)
EOSINOPHIL # BLD: 0.3 K/UL (ref 0–0.4)
EOSINOPHIL # BLD: 0.4 K/UL (ref 0–0.4)
EOSINOPHIL # BLD: 0.7 K/UL (ref 0–0.4)
EOSINOPHIL NFR BLD: 1 % (ref 0–7)
EOSINOPHIL NFR BLD: 1 % (ref 0–7)
EOSINOPHIL NFR BLD: 11 % (ref 0–7)
EOSINOPHIL NFR BLD: 4 % (ref 0–7)
EOSINOPHIL NFR BLD: 5 % (ref 0–7)
EOSINOPHIL NFR BLD: 5 % (ref 0–7)
EPITH CASTS URNS QL MICRO: ABNORMAL /LPF
EPITH CASTS URNS QL MICRO: ABNORMAL /LPF
ERYTHROCYTE [DISTWIDTH] IN BLOOD BY AUTOMATED COUNT: 13.1 % (ref 11.5–14.5)
ERYTHROCYTE [DISTWIDTH] IN BLOOD BY AUTOMATED COUNT: 13.2 % (ref 11.5–14.5)
ERYTHROCYTE [DISTWIDTH] IN BLOOD BY AUTOMATED COUNT: 13.6 % (ref 11.8–15.8)
ERYTHROCYTE [DISTWIDTH] IN BLOOD BY AUTOMATED COUNT: 14 % (ref 11.5–14.5)
ERYTHROCYTE [DISTWIDTH] IN BLOOD BY AUTOMATED COUNT: 14.4 % (ref 11.5–14.5)
ERYTHROCYTE [DISTWIDTH] IN BLOOD BY AUTOMATED COUNT: 14.8 % (ref 11.5–14.5)
ERYTHROCYTE [DISTWIDTH] IN BLOOD BY AUTOMATED COUNT: 15.3 % (ref 11.5–14.5)
ERYTHROCYTE [DISTWIDTH] IN BLOOD BY AUTOMATED COUNT: 16.8 % (ref 11.8–15.8)
ERYTHROCYTE [DISTWIDTH] IN BLOOD BY AUTOMATED COUNT: 17.5 % (ref 11.8–15.8)
ERYTHROCYTE [DISTWIDTH] IN BLOOD BY AUTOMATED COUNT: 19 % (ref 11.8–15.8)
EST. AVERAGE GLUCOSE BLD GHB EST-MCNC: 126 MG/DL
GLOBULIN SER CALC-MCNC: 2.5 G/DL (ref 2–4)
GLOBULIN SER CALC-MCNC: 2.5 G/DL (ref 2–4)
GLOBULIN SER CALC-MCNC: 2.7 G/DL (ref 2–4)
GLOBULIN SER CALC-MCNC: 2.8 G/DL (ref 2–4)
GLOBULIN SER CALC-MCNC: 2.9 G/DL (ref 2–4)
GLOBULIN SER CALC-MCNC: 2.9 G/DL (ref 2–4)
GLOBULIN SER CALC-MCNC: 3 G/DL (ref 2–4)
GLOBULIN SER CALC-MCNC: 3.1 G/DL (ref 2–4)
GLOBULIN SER CALC-MCNC: 3.5 G/DL (ref 2–4)
GLUCOSE BLD-MCNC: 108 MG/DL (ref 65–100)
GLUCOSE BLD-MCNC: 125 MG/DL (ref 65–100)
GLUCOSE BLD-MCNC: 138 MG/DL (ref 65–100)
GLUCOSE BLD-MCNC: 239 MG/DL (ref 65–100)
GLUCOSE BLD-MCNC: 241 MG/DL (ref 65–100)
GLUCOSE BLD-MCNC: 279 MG/DL (ref 65–100)
GLUCOSE SERPL-MCNC: 115 MG/DL (ref 65–100)
GLUCOSE SERPL-MCNC: 117 MG/DL (ref 65–100)
GLUCOSE SERPL-MCNC: 120 MG/DL (ref 65–100)
GLUCOSE SERPL-MCNC: 122 MG/DL (ref 65–100)
GLUCOSE SERPL-MCNC: 144 MG/DL (ref 65–100)
GLUCOSE SERPL-MCNC: 148 MG/DL (ref 65–100)
GLUCOSE SERPL-MCNC: 170 MG/DL (ref 65–100)
GLUCOSE SERPL-MCNC: 171 MG/DL (ref 65–100)
GLUCOSE SERPL-MCNC: 184 MG/DL (ref 65–100)
GLUCOSE SERPL-MCNC: 258 MG/DL (ref 65–100)
GLUCOSE SERPL-MCNC: 260 MG/DL (ref 65–100)
GLUCOSE SERPL-MCNC: 98 MG/DL (ref 65–100)
GLUCOSE UR STRIP.AUTO-MCNC: >1000 MG/DL
GLUCOSE UR STRIP.AUTO-MCNC: >1000 MG/DL
HBA1C MFR BLD: 6 % (ref 4–5.6)
HCO3 BLD-SCNC: 23 MMOL/L (ref 22–26)
HCT VFR BLD AUTO: 27.9 % (ref 36.6–50.3)
HCT VFR BLD AUTO: 28.4 % (ref 36.6–50.3)
HCT VFR BLD AUTO: 30.9 % (ref 36.6–50.3)
HCT VFR BLD AUTO: 34.1 % (ref 36.6–50.3)
HCT VFR BLD AUTO: 34.5 % (ref 36.6–50.3)
HCT VFR BLD AUTO: 35.5 % (ref 36.6–50.3)
HCT VFR BLD AUTO: 37.3 % (ref 36.6–50.3)
HCT VFR BLD AUTO: 37.5 % (ref 36.6–50.3)
HCT VFR BLD AUTO: 37.7 % (ref 36.6–50.3)
HCT VFR BLD AUTO: 38.3 % (ref 36.6–50.3)
HCT VFR BLD AUTO: 39 % (ref 36.6–50.3)
HCT VFR BLD AUTO: 41.6 % (ref 36.6–50.3)
HCT VFR BLD CALC: 28 % (ref 36.6–50.3)
HDLC SERPL-MCNC: 44 MG/DL
HDLC SERPL: 1.9 {RATIO} (ref 0–5)
HGB BLD-MCNC: 10.1 G/DL (ref 12.1–17)
HGB BLD-MCNC: 10.6 G/DL (ref 12.1–17)
HGB BLD-MCNC: 10.7 G/DL (ref 12.1–17)
HGB BLD-MCNC: 11.1 G/DL (ref 12.1–17)
HGB BLD-MCNC: 11.7 G/DL (ref 12.1–17)
HGB BLD-MCNC: 12 G/DL (ref 12.1–17)
HGB BLD-MCNC: 12 G/DL (ref 12.1–17)
HGB BLD-MCNC: 12.1 G/DL (ref 12.1–17)
HGB BLD-MCNC: 12.6 G/DL (ref 12.1–17)
HGB BLD-MCNC: 13.5 G/DL (ref 12.1–17)
HGB BLD-MCNC: 9.1 G/DL (ref 12.1–17)
HGB BLD-MCNC: 9.1 G/DL (ref 12.1–17)
HGB UR QL STRIP: NEGATIVE
HGB UR QL STRIP: NEGATIVE
HYALINE CASTS URNS QL MICRO: ABNORMAL /LPF (ref 0–5)
HYALINE CASTS URNS QL MICRO: ABNORMAL /LPF (ref 0–5)
IMM GRANULOCYTES # BLD AUTO: 0 K/UL (ref 0–0.04)
IMM GRANULOCYTES # BLD AUTO: 0.1 K/UL (ref 0–0.04)
IMM GRANULOCYTES # BLD AUTO: 0.2 K/UL (ref 0–0.04)
IMM GRANULOCYTES NFR BLD AUTO: 0 % (ref 0–0.5)
IMM GRANULOCYTES NFR BLD AUTO: 1 % (ref 0–0.5)
KETONES UR QL STRIP.AUTO: 40 MG/DL
KETONES UR QL STRIP.AUTO: NEGATIVE MG/DL
LACTATE BLD-SCNC: 0.76 MMOL/L (ref 0.4–2)
LDLC SERPL CALC-MCNC: 24.6 MG/DL (ref 0–100)
LEUKOCYTE ESTERASE UR QL STRIP.AUTO: NEGATIVE
LEUKOCYTE ESTERASE UR QL STRIP.AUTO: NEGATIVE
LIPASE SERPL-CCNC: 30 U/L (ref 73–393)
LIPASE SERPL-CCNC: 95 U/L (ref 73–393)
LYMPHOCYTES # BLD: 0.3 K/UL (ref 0.8–3.5)
LYMPHOCYTES # BLD: 0.4 K/UL (ref 0.8–3.5)
LYMPHOCYTES # BLD: 0.5 K/UL (ref 0.8–3.5)
LYMPHOCYTES # BLD: 0.6 K/UL (ref 0.8–3.5)
LYMPHOCYTES # BLD: 0.7 K/UL (ref 0.8–3.5)
LYMPHOCYTES # BLD: 1 K/UL (ref 0.8–3.5)
LYMPHOCYTES NFR BLD: 14 % (ref 12–49)
LYMPHOCYTES NFR BLD: 15 % (ref 12–49)
LYMPHOCYTES NFR BLD: 19 % (ref 12–49)
LYMPHOCYTES NFR BLD: 3 % (ref 12–49)
LYMPHOCYTES NFR BLD: 6 % (ref 12–49)
LYMPHOCYTES NFR BLD: 6 % (ref 12–49)
LYMPHOCYTES NFR BLD: 7 % (ref 12–49)
LYMPHOCYTES NFR BLD: 8 % (ref 12–49)
LYMPHOCYTES NFR BLD: 8 % (ref 12–49)
LYMPHOCYTES NFR BLD: 9 % (ref 12–49)
MAGNESIUM SERPL-MCNC: 1.8 MG/DL (ref 1.6–2.4)
MAGNESIUM SERPL-MCNC: 1.9 MG/DL (ref 1.6–2.4)
MAGNESIUM SERPL-MCNC: 2 MG/DL (ref 1.6–2.4)
MAGNESIUM SERPL-MCNC: 2.2 MG/DL (ref 1.6–2.4)
MAGNESIUM SERPL-MCNC: 2.3 MG/DL (ref 1.6–2.4)
MAGNESIUM SERPL-MCNC: 2.4 MG/DL (ref 1.6–2.4)
MAGNESIUM SERPL-MCNC: 2.5 MG/DL (ref 1.6–2.4)
MAGNESIUM SERPL-MCNC: 2.5 MG/DL (ref 1.6–2.4)
MAGNESIUM SERPL-MCNC: 2.6 MG/DL (ref 1.6–2.4)
MCH RBC QN AUTO: 29.1 PG (ref 26–34)
MCH RBC QN AUTO: 29.4 PG (ref 26–34)
MCH RBC QN AUTO: 30.3 PG (ref 26–34)
MCH RBC QN AUTO: 30.4 PG (ref 26–34)
MCH RBC QN AUTO: 30.4 PG (ref 26–34)
MCH RBC QN AUTO: 31.1 PG (ref 26–34)
MCH RBC QN AUTO: 31.4 PG (ref 26–34)
MCH RBC QN AUTO: 31.8 PG (ref 26–34)
MCH RBC QN AUTO: 32 PG (ref 26–34)
MCH RBC QN AUTO: 32.6 PG (ref 26–34)
MCH RBC QN AUTO: 32.7 PG (ref 26–34)
MCH RBC QN AUTO: 33.2 PG (ref 26–34)
MCHC RBC AUTO-ENTMCNC: 31 G/DL (ref 30–36.5)
MCHC RBC AUTO-ENTMCNC: 31.1 G/DL (ref 30–36.5)
MCHC RBC AUTO-ENTMCNC: 31.3 G/DL (ref 30–36.5)
MCHC RBC AUTO-ENTMCNC: 31.4 G/DL (ref 30–36.5)
MCHC RBC AUTO-ENTMCNC: 31.6 G/DL (ref 30–36.5)
MCHC RBC AUTO-ENTMCNC: 31.8 G/DL (ref 30–36.5)
MCHC RBC AUTO-ENTMCNC: 32 G/DL (ref 30–36.5)
MCHC RBC AUTO-ENTMCNC: 32 G/DL (ref 30–36.5)
MCHC RBC AUTO-ENTMCNC: 32.3 G/DL (ref 30–36.5)
MCHC RBC AUTO-ENTMCNC: 32.5 G/DL (ref 30–36.5)
MCHC RBC AUTO-ENTMCNC: 32.6 G/DL (ref 30–36.5)
MCHC RBC AUTO-ENTMCNC: 32.7 G/DL (ref 30–36.5)
MCV RBC AUTO: 100 FL (ref 80–99)
MCV RBC AUTO: 101.6 FL (ref 80–99)
MCV RBC AUTO: 101.7 FL (ref 80–99)
MCV RBC AUTO: 102.2 FL (ref 80–99)
MCV RBC AUTO: 103 FL (ref 80–99)
MCV RBC AUTO: 90.9 FL (ref 80–99)
MCV RBC AUTO: 91.3 FL (ref 80–99)
MCV RBC AUTO: 95.9 FL (ref 80–99)
MCV RBC AUTO: 96.2 FL (ref 80–99)
MCV RBC AUTO: 96.9 FL (ref 80–99)
MCV RBC AUTO: 97.7 FL (ref 80–99)
MCV RBC AUTO: 98.2 FL (ref 80–99)
MICROALBUMIN UR-MCNC: 0.73 MG/DL
MICROALBUMIN/CREAT UR-RTO: 9 MG/G (ref 0–30)
MONOCYTES # BLD: 0.4 K/UL (ref 0–1)
MONOCYTES # BLD: 0.5 K/UL (ref 0–1)
MONOCYTES # BLD: 0.7 K/UL (ref 0–1)
MONOCYTES # BLD: 0.7 K/UL (ref 0–1)
MONOCYTES # BLD: 0.9 K/UL (ref 0–1)
MONOCYTES NFR BLD: 11 % (ref 5–13)
MONOCYTES NFR BLD: 5 % (ref 5–13)
MONOCYTES NFR BLD: 6 % (ref 5–13)
MONOCYTES NFR BLD: 6 % (ref 5–13)
MONOCYTES NFR BLD: 8 % (ref 5–13)
NEUTS SEG # BLD: 1.7 K/UL (ref 1.8–8)
NEUTS SEG # BLD: 10 K/UL (ref 1.8–8)
NEUTS SEG # BLD: 13.5 K/UL (ref 1.8–8)
NEUTS SEG # BLD: 3 K/UL (ref 1.8–8)
NEUTS SEG # BLD: 3.4 K/UL (ref 1.8–8)
NEUTS SEG # BLD: 3.6 K/UL (ref 1.8–8)
NEUTS SEG # BLD: 3.7 K/UL (ref 1.8–8)
NEUTS SEG # BLD: 3.8 K/UL (ref 1.8–8)
NEUTS SEG # BLD: 4 K/UL (ref 1.8–8)
NEUTS SEG # BLD: 4.5 K/UL (ref 1.8–8)
NEUTS SEG # BLD: 4.7 K/UL (ref 1.8–8)
NEUTS SEG # BLD: 7.4 K/UL (ref 1.8–8)
NEUTS SEG NFR BLD: 68 % (ref 32–75)
NEUTS SEG NFR BLD: 69 % (ref 32–75)
NEUTS SEG NFR BLD: 69 % (ref 32–75)
NEUTS SEG NFR BLD: 75 % (ref 32–75)
NEUTS SEG NFR BLD: 77 % (ref 32–75)
NEUTS SEG NFR BLD: 77 % (ref 32–75)
NEUTS SEG NFR BLD: 78 % (ref 32–75)
NEUTS SEG NFR BLD: 78 % (ref 32–75)
NEUTS SEG NFR BLD: 80 % (ref 32–75)
NEUTS SEG NFR BLD: 84 % (ref 32–75)
NEUTS SEG NFR BLD: 86 % (ref 32–75)
NEUTS SEG NFR BLD: 89 % (ref 32–75)
NITRITE UR QL STRIP.AUTO: NEGATIVE
NITRITE UR QL STRIP.AUTO: NEGATIVE
NRBC # BLD: 0 K/UL (ref 0–0.01)
NRBC BLD-RTO: 0 PER 100 WBC
P-R INTERVAL, ECG05: 166 MS
PCO2 BLD: 38.2 MMHG (ref 35–45)
PH BLD: 7.39 [PH] (ref 7.35–7.45)
PH UR STRIP: 6.5 [PH] (ref 5–8)
PH UR STRIP: 7 [PH] (ref 5–8)
PHOSPHATE SERPL-MCNC: 2.2 MG/DL (ref 2.6–4.7)
PHOSPHATE SERPL-MCNC: 2.2 MG/DL (ref 2.6–4.7)
PHOSPHATE SERPL-MCNC: 2.5 MG/DL (ref 2.6–4.7)
PHOSPHATE SERPL-MCNC: 2.7 MG/DL (ref 2.6–4.7)
PHOSPHATE SERPL-MCNC: 2.8 MG/DL (ref 2.6–4.7)
PHOSPHATE SERPL-MCNC: 3 MG/DL (ref 2.6–4.7)
PHOSPHATE SERPL-MCNC: 3.1 MG/DL (ref 2.6–4.7)
PHOSPHATE SERPL-MCNC: 3.2 MG/DL (ref 2.6–4.7)
PHOSPHATE SERPL-MCNC: 3.3 MG/DL (ref 2.6–4.7)
PHOSPHATE SERPL-MCNC: 3.5 MG/DL (ref 2.6–4.7)
PHOSPHATE SERPL-MCNC: 3.7 MG/DL (ref 2.6–4.7)
PLATELET # BLD AUTO: 105 K/UL (ref 150–400)
PLATELET # BLD AUTO: 106 K/UL (ref 150–400)
PLATELET # BLD AUTO: 115 K/UL (ref 150–400)
PLATELET # BLD AUTO: 116 K/UL (ref 150–400)
PLATELET # BLD AUTO: 119 K/UL (ref 150–400)
PLATELET # BLD AUTO: 124 K/UL (ref 150–400)
PLATELET # BLD AUTO: 132 K/UL (ref 150–400)
PLATELET # BLD AUTO: 147 K/UL (ref 150–400)
PLATELET # BLD AUTO: 178 K/UL (ref 150–400)
PLATELET # BLD AUTO: 57 K/UL (ref 150–400)
PLATELET # BLD AUTO: 63 K/UL (ref 150–400)
PLATELET # BLD AUTO: 83 K/UL (ref 150–400)
PMV BLD AUTO: 10.1 FL (ref 8.9–12.9)
PMV BLD AUTO: 10.2 FL (ref 8.9–12.9)
PMV BLD AUTO: 10.6 FL (ref 8.9–12.9)
PMV BLD AUTO: 10.7 FL (ref 8.9–12.9)
PMV BLD AUTO: 10.7 FL (ref 8.9–12.9)
PMV BLD AUTO: 10.8 FL (ref 8.9–12.9)
PMV BLD AUTO: 10.9 FL (ref 8.9–12.9)
PMV BLD AUTO: 11.3 FL (ref 8.9–12.9)
PO2 BLD: 31 MMHG (ref 80–100)
POTASSIUM BLD-SCNC: 3.3 MMOL/L (ref 3.5–5.1)
POTASSIUM BLD-SCNC: 3.4 MMOL/L (ref 3.5–5.1)
POTASSIUM BLD-SCNC: 3.6 MMOL/L (ref 3.5–5.1)
POTASSIUM BLD-SCNC: 4 MMOL/L (ref 3.5–5.1)
POTASSIUM BLD-SCNC: 4 MMOL/L (ref 3.5–5.1)
POTASSIUM BLD-SCNC: 4.1 MMOL/L (ref 3.5–5.1)
POTASSIUM SERPL-SCNC: 3.3 MMOL/L (ref 3.5–5.1)
POTASSIUM SERPL-SCNC: 3.4 MMOL/L (ref 3.5–5.1)
POTASSIUM SERPL-SCNC: 3.6 MMOL/L (ref 3.5–5.1)
POTASSIUM SERPL-SCNC: 3.8 MMOL/L (ref 3.5–5.1)
POTASSIUM SERPL-SCNC: 3.9 MMOL/L (ref 3.5–5.1)
POTASSIUM SERPL-SCNC: 4 MMOL/L (ref 3.5–5.1)
POTASSIUM SERPL-SCNC: 4.1 MMOL/L (ref 3.5–5.1)
POTASSIUM SERPL-SCNC: 4.1 MMOL/L (ref 3.5–5.1)
POTASSIUM SERPL-SCNC: 4.2 MMOL/L (ref 3.5–5.1)
POTASSIUM SERPL-SCNC: 4.4 MMOL/L (ref 3.5–5.1)
PROCALCITONIN SERPL-MCNC: 0.47 NG/ML
PROT SERPL-MCNC: 5.5 G/DL (ref 6.4–8.2)
PROT SERPL-MCNC: 5.6 G/DL (ref 6.4–8.2)
PROT SERPL-MCNC: 5.8 G/DL (ref 6.4–8.2)
PROT SERPL-MCNC: 5.9 G/DL (ref 6.4–8.2)
PROT SERPL-MCNC: 6 G/DL (ref 6.4–8.2)
PROT SERPL-MCNC: 6.1 G/DL (ref 6.4–8.2)
PROT SERPL-MCNC: 6.1 G/DL (ref 6.4–8.2)
PROT SERPL-MCNC: 6.2 G/DL (ref 6.4–8.2)
PROT SERPL-MCNC: 6.2 G/DL (ref 6.4–8.2)
PROT SERPL-MCNC: 6.4 G/DL (ref 6.4–8.2)
PROT SERPL-MCNC: 6.6 G/DL (ref 6.4–8.2)
PROT SERPL-MCNC: 7 G/DL (ref 6.4–8.2)
PROT UR STRIP-MCNC: NEGATIVE MG/DL
PROT UR STRIP-MCNC: NEGATIVE MG/DL
Q-T INTERVAL, ECG07: 414 MS
QRS DURATION, ECG06: 90 MS
QTC CALCULATION (BEZET), ECG08: 440 MS
RBC # BLD AUTO: 2.78 M/UL (ref 4.1–5.7)
RBC # BLD AUTO: 2.79 M/UL (ref 4.1–5.7)
RBC # BLD AUTO: 3.04 M/UL (ref 4.1–5.7)
RBC # BLD AUTO: 3.31 M/UL (ref 4.1–5.7)
RBC # BLD AUTO: 3.49 M/UL (ref 4.1–5.7)
RBC # BLD AUTO: 3.53 M/UL (ref 4.1–5.7)
RBC # BLD AUTO: 3.82 M/UL (ref 4.1–5.7)
RBC # BLD AUTO: 3.85 M/UL (ref 4.1–5.7)
RBC # BLD AUTO: 3.98 M/UL (ref 4.1–5.7)
RBC # BLD AUTO: 4.13 M/UL (ref 4.1–5.7)
RBC # BLD AUTO: 4.29 M/UL (ref 4.1–5.7)
RBC # BLD AUTO: 4.34 M/UL (ref 4.1–5.7)
RBC #/AREA URNS HPF: ABNORMAL /HPF (ref 0–5)
RBC #/AREA URNS HPF: ABNORMAL /HPF (ref 0–5)
RBC MORPH BLD: ABNORMAL
SAO2 % BLD: 59.4 % (ref 92–97)
SARS-COV-2, COV2: NORMAL
SARS-COV-2, XPLCVT: NOT DETECTED
SERVICE CMNT-IMP: ABNORMAL
SERVICE CMNT-IMP: NORMAL
SODIUM BLD-SCNC: 134 MMOL/L (ref 136–145)
SODIUM BLD-SCNC: 135 MMOL/L (ref 136–145)
SODIUM BLD-SCNC: 138 MMOL/L (ref 136–145)
SODIUM BLD-SCNC: 138 MMOL/L (ref 136–145)
SODIUM BLD-SCNC: 141 MMOL/L (ref 136–145)
SODIUM BLD-SCNC: 142 MMOL/L (ref 136–145)
SODIUM SERPL-SCNC: 135 MMOL/L (ref 136–145)
SODIUM SERPL-SCNC: 136 MMOL/L (ref 136–145)
SODIUM SERPL-SCNC: 137 MMOL/L (ref 136–145)
SODIUM SERPL-SCNC: 138 MMOL/L (ref 136–145)
SODIUM SERPL-SCNC: 139 MMOL/L (ref 136–145)
SODIUM SERPL-SCNC: 140 MMOL/L (ref 136–145)
SODIUM SERPL-SCNC: 141 MMOL/L (ref 136–145)
SOURCE, COVRS: NORMAL
SP GR UR REFRACTOMETRY: 1.02 (ref 1–1.03)
SP GR UR REFRACTOMETRY: 1.02 (ref 1–1.03)
SPECIMEN TYPE: ABNORMAL
TRIGL SERPL-MCNC: 67 MG/DL (ref ?–150)
TSH SERPL DL<=0.05 MIU/L-ACNC: 1.05 UIU/ML (ref 0.36–3.74)
UA: UC IF INDICATED,UAUC: ABNORMAL
UA: UC IF INDICATED,UAUC: ABNORMAL
UROBILINOGEN UR QL STRIP.AUTO: 1 EU/DL (ref 0.2–1)
UROBILINOGEN UR QL STRIP.AUTO: 1 EU/DL (ref 0.2–1)
VENTRICULAR RATE, ECG03: 68 BPM
VIT A SERPL-MCNC: 39.1 UG/DL (ref 22–69.5)
VIT B12 SERPL-MCNC: 336 PG/ML (ref 193–986)
VITAMIN E/ALPHA-TOCOPHEROL: 7.4 MG/L (ref 9–29)
VITAMIN E/GAMMA-TOCOPHEROL: 0.3 MG/L (ref 0.5–4.9)
VLDLC SERPL CALC-MCNC: 13.4 MG/DL
WBC # BLD AUTO: 11.6 K/UL (ref 4.1–11.1)
WBC # BLD AUTO: 15.3 K/UL (ref 4.1–11.1)
WBC # BLD AUTO: 2.4 K/UL (ref 4.1–11.1)
WBC # BLD AUTO: 3.9 K/UL (ref 4.1–11.1)
WBC # BLD AUTO: 4.5 K/UL (ref 4.1–11.1)
WBC # BLD AUTO: 4.8 K/UL (ref 4.1–11.1)
WBC # BLD AUTO: 4.8 K/UL (ref 4.1–11.1)
WBC # BLD AUTO: 5.1 K/UL (ref 4.1–11.1)
WBC # BLD AUTO: 5.3 K/UL (ref 4.1–11.1)
WBC # BLD AUTO: 6 K/UL (ref 4.1–11.1)
WBC # BLD AUTO: 6.5 K/UL (ref 4.1–11.1)
WBC # BLD AUTO: 8.8 K/UL (ref 4.1–11.1)
WBC URNS QL MICRO: ABNORMAL /HPF (ref 0–4)
WBC URNS QL MICRO: ABNORMAL /HPF (ref 0–4)

## 2021-01-01 PROCEDURE — G8536 NO DOC ELDER MAL SCRN: HCPCS | Performed by: INTERNAL MEDICINE

## 2021-01-01 PROCEDURE — 86301 IMMUNOASSAY TUMOR CA 19-9: CPT

## 2021-01-01 PROCEDURE — 36415 COLL VENOUS BLD VENIPUNCTURE: CPT

## 2021-01-01 PROCEDURE — 80053 COMPREHEN METABOLIC PANEL: CPT

## 2021-01-01 PROCEDURE — 96360 HYDRATION IV INFUSION INIT: CPT

## 2021-01-01 PROCEDURE — 77030012965 HC NDL HUBR BBMI -A

## 2021-01-01 PROCEDURE — 71260 CT THORAX DX C+: CPT

## 2021-01-01 PROCEDURE — 74011250636 HC RX REV CODE- 250/636: Performed by: INTERNAL MEDICINE

## 2021-01-01 PROCEDURE — G8510 SCR DEP NEG, NO PLAN REQD: HCPCS | Performed by: INTERNAL MEDICINE

## 2021-01-01 PROCEDURE — 99214 OFFICE O/P EST MOD 30 MIN: CPT | Performed by: INTERNAL MEDICINE

## 2021-01-01 PROCEDURE — 80047 BASIC METABLC PNL IONIZED CA: CPT

## 2021-01-01 PROCEDURE — 99213 OFFICE O/P EST LOW 20 MIN: CPT | Performed by: INTERNAL MEDICINE

## 2021-01-01 PROCEDURE — 96372 THER/PROPH/DIAG INJ SC/IM: CPT

## 2021-01-01 PROCEDURE — 83735 ASSAY OF MAGNESIUM: CPT

## 2021-01-01 PROCEDURE — G8754 DIAS BP LESS 90: HCPCS | Performed by: INTERNAL MEDICINE

## 2021-01-01 PROCEDURE — G8420 CALC BMI NORM PARAMETERS: HCPCS | Performed by: INTERNAL MEDICINE

## 2021-01-01 PROCEDURE — 99284 EMERGENCY DEPT VISIT MOD MDM: CPT

## 2021-01-01 PROCEDURE — 96413 CHEMO IV INFUSION 1 HR: CPT

## 2021-01-01 PROCEDURE — 78306 BONE IMAGING WHOLE BODY: CPT

## 2021-01-01 PROCEDURE — 74011000250 HC RX REV CODE- 250: Performed by: INTERNAL MEDICINE

## 2021-01-01 PROCEDURE — 81001 URINALYSIS AUTO W/SCOPE: CPT

## 2021-01-01 PROCEDURE — 74011000250 HC RX REV CODE- 250: Performed by: STUDENT IN AN ORGANIZED HEALTH CARE EDUCATION/TRAINING PROGRAM

## 2021-01-01 PROCEDURE — 96376 TX/PRO/DX INJ SAME DRUG ADON: CPT

## 2021-01-01 PROCEDURE — 85025 COMPLETE CBC W/AUTO DIFF WBC: CPT

## 2021-01-01 PROCEDURE — 84100 ASSAY OF PHOSPHORUS: CPT

## 2021-01-01 PROCEDURE — G8427 DOCREV CUR MEDS BY ELIG CLIN: HCPCS | Performed by: INTERNAL MEDICINE

## 2021-01-01 PROCEDURE — 74011250636 HC RX REV CODE- 250/636: Performed by: EMERGENCY MEDICINE

## 2021-01-01 PROCEDURE — 99215 OFFICE O/P EST HI 40 MIN: CPT | Performed by: INTERNAL MEDICINE

## 2021-01-01 PROCEDURE — 74011000258 HC RX REV CODE- 258: Performed by: INTERNAL MEDICINE

## 2021-01-01 PROCEDURE — 96374 THER/PROPH/DIAG INJ IV PUSH: CPT

## 2021-01-01 PROCEDURE — U0005 INFEC AGEN DETEC AMPLI PROBE: HCPCS

## 2021-01-01 PROCEDURE — 0201T PERQ SACRAL AUGMT BILAT INJ: CPT

## 2021-01-01 PROCEDURE — 74011250636 HC RX REV CODE- 250/636

## 2021-01-01 PROCEDURE — 96367 TX/PROPH/DG ADDL SEQ IV INF: CPT

## 2021-01-01 PROCEDURE — G8752 SYS BP LESS 140: HCPCS | Performed by: INTERNAL MEDICINE

## 2021-01-01 PROCEDURE — 74011000636 HC RX REV CODE- 636: Performed by: INTERNAL MEDICINE

## 2021-01-01 PROCEDURE — 77030021782 HC SYS CEM CART DEL KYPH -C

## 2021-01-01 PROCEDURE — 94762 N-INVAS EAR/PLS OXIMTRY CONT: CPT

## 2021-01-01 PROCEDURE — 2709999900 HC NON-CHARGEABLE SUPPLY

## 2021-01-01 PROCEDURE — A9576 INJ PROHANCE MULTIPACK: HCPCS

## 2021-01-01 PROCEDURE — G0463 HOSPITAL OUTPT CLINIC VISIT: HCPCS | Performed by: INTERNAL MEDICINE

## 2021-01-01 PROCEDURE — 99213 OFFICE O/P EST LOW 20 MIN: CPT | Performed by: NURSE PRACTITIONER

## 2021-01-01 PROCEDURE — G0403 EKG FOR INITIAL PREVENT EXAM: HCPCS | Performed by: INTERNAL MEDICINE

## 2021-01-01 PROCEDURE — G8756 NO BP MEASURE DOC: HCPCS | Performed by: INTERNAL MEDICINE

## 2021-01-01 PROCEDURE — 74011250636 HC RX REV CODE- 250/636: Performed by: NURSE PRACTITIONER

## 2021-01-01 PROCEDURE — 1101F PT FALLS ASSESS-DOCD LE1/YR: CPT | Performed by: INTERNAL MEDICINE

## 2021-01-01 PROCEDURE — 3017F COLORECTAL CA SCREEN DOC REV: CPT | Performed by: INTERNAL MEDICINE

## 2021-01-01 PROCEDURE — 74011250636 HC RX REV CODE- 250/636: Performed by: STUDENT IN AN ORGANIZED HEALTH CARE EDUCATION/TRAINING PROGRAM

## 2021-01-01 PROCEDURE — 2022F DILAT RTA XM EVC RTNOPTHY: CPT | Performed by: INTERNAL MEDICINE

## 2021-01-01 PROCEDURE — 36591 DRAW BLOOD OFF VENOUS DEVICE: CPT

## 2021-01-01 PROCEDURE — 83690 ASSAY OF LIPASE: CPT

## 2021-01-01 PROCEDURE — 93005 ELECTROCARDIOGRAM TRACING: CPT

## 2021-01-01 PROCEDURE — 74177 CT ABD & PELVIS W/CONTRAST: CPT

## 2021-01-01 PROCEDURE — G8432 DEP SCR NOT DOC, RNG: HCPCS | Performed by: INTERNAL MEDICINE

## 2021-01-01 PROCEDURE — 99285 EMERGENCY DEPT VISIT HI MDM: CPT

## 2021-01-01 PROCEDURE — 96375 TX/PRO/DX INJ NEW DRUG ADDON: CPT

## 2021-01-01 PROCEDURE — 75810000275 HC EMERGENCY DEPT VISIT NO LEVEL OF CARE

## 2021-01-01 PROCEDURE — 96361 HYDRATE IV INFUSION ADD-ON: CPT

## 2021-01-01 PROCEDURE — 74011000636 HC RX REV CODE- 636: Performed by: EMERGENCY MEDICINE

## 2021-01-01 PROCEDURE — 77030037493 HC PRB KT OSTEOCOOL MEDT -I2

## 2021-01-01 PROCEDURE — 99214 OFFICE O/P EST MOD 30 MIN: CPT | Performed by: FAMILY MEDICINE

## 2021-01-01 PROCEDURE — 80069 RENAL FUNCTION PANEL: CPT

## 2021-01-01 PROCEDURE — 77030021783 HC SYS CEM DEL MEDT -D

## 2021-01-01 PROCEDURE — 87186 SC STD MICRODIL/AGAR DIL: CPT

## 2021-01-01 PROCEDURE — 77030037492 HC KT BN ACCS OSTEOCOOL MEDT -E

## 2021-01-01 PROCEDURE — G0402 INITIAL PREVENTIVE EXAM: HCPCS | Performed by: INTERNAL MEDICINE

## 2021-01-01 PROCEDURE — 80076 HEPATIC FUNCTION PANEL: CPT

## 2021-01-01 PROCEDURE — 87077 CULTURE AEROBIC IDENTIFY: CPT

## 2021-01-01 PROCEDURE — 74011250637 HC RX REV CODE- 250/637: Performed by: EMERGENCY MEDICINE

## 2021-01-01 PROCEDURE — 96417 CHEMO IV INFUS EACH ADDL SEQ: CPT

## 2021-01-01 PROCEDURE — C1713 ANCHOR/SCREW BN/BN,TIS/BN: HCPCS

## 2021-01-01 PROCEDURE — 87040 BLOOD CULTURE FOR BACTERIA: CPT

## 2021-01-01 PROCEDURE — 77030003666 HC NDL SPINAL BD -A

## 2021-01-01 PROCEDURE — 84145 PROCALCITONIN (PCT): CPT

## 2021-01-01 PROCEDURE — 83605 ASSAY OF LACTIC ACID: CPT

## 2021-01-01 PROCEDURE — 99212 OFFICE O/P EST SF 10 MIN: CPT | Performed by: NURSE PRACTITIONER

## 2021-01-01 PROCEDURE — 71046 X-RAY EXAM CHEST 2 VIEWS: CPT

## 2021-01-01 PROCEDURE — 71045 X-RAY EXAM CHEST 1 VIEW: CPT

## 2021-01-01 PROCEDURE — 72197 MRI PELVIS W/O & W/DYE: CPT

## 2021-01-01 PROCEDURE — 3044F HG A1C LEVEL LT 7.0%: CPT | Performed by: INTERNAL MEDICINE

## 2021-01-01 RX ORDER — DIPHENHYDRAMINE HYDROCHLORIDE 50 MG/ML
50 INJECTION, SOLUTION INTRAMUSCULAR; INTRAVENOUS AS NEEDED
Status: CANCELLED
Start: 2021-01-01

## 2021-01-01 RX ORDER — ONDANSETRON 2 MG/ML
4 INJECTION INTRAMUSCULAR; INTRAVENOUS
Status: COMPLETED | OUTPATIENT
Start: 2021-01-01 | End: 2021-01-01

## 2021-01-01 RX ORDER — SODIUM CHLORIDE 9 MG/ML
25 INJECTION, SOLUTION INTRAVENOUS CONTINUOUS
Status: DISCONTINUED | OUTPATIENT
Start: 2021-01-01 | End: 2021-01-01 | Stop reason: HOSPADM

## 2021-01-01 RX ORDER — EPINEPHRINE 1 MG/ML
0.3 INJECTION, SOLUTION, CONCENTRATE INTRAVENOUS AS NEEDED
Status: CANCELLED | OUTPATIENT
Start: 2021-01-01

## 2021-01-01 RX ORDER — HEPARIN 100 UNIT/ML
300-500 SYRINGE INTRAVENOUS AS NEEDED
Status: CANCELLED | OUTPATIENT
Start: 2021-01-01

## 2021-01-01 RX ORDER — METHADONE HYDROCHLORIDE 10 MG/1
10 TABLET ORAL EVERY 8 HOURS
Qty: 90 TABLET | Refills: 0 | Status: SHIPPED | OUTPATIENT
Start: 2021-01-01 | End: 2022-01-01 | Stop reason: SDUPTHER

## 2021-01-01 RX ORDER — ALBUTEROL SULFATE 0.83 MG/ML
2.5 SOLUTION RESPIRATORY (INHALATION) AS NEEDED
Status: CANCELLED
Start: 2021-01-01

## 2021-01-01 RX ORDER — MORPHINE 10 MG/ML
1 TINCTURE ORAL
Qty: 118 ML | Refills: 0 | Status: SHIPPED | OUTPATIENT
Start: 2021-01-01 | End: 2021-01-01 | Stop reason: SDUPTHER

## 2021-01-01 RX ORDER — SODIUM CHLORIDE 9 MG/ML
10 INJECTION INTRAMUSCULAR; INTRAVENOUS; SUBCUTANEOUS AS NEEDED
Status: ACTIVE | OUTPATIENT
Start: 2021-01-01 | End: 2021-01-01

## 2021-01-01 RX ORDER — DIPHENHYDRAMINE HYDROCHLORIDE 50 MG/ML
25-50 INJECTION, SOLUTION INTRAMUSCULAR; INTRAVENOUS ONCE
Status: COMPLETED | OUTPATIENT
Start: 2021-01-01 | End: 2021-01-01

## 2021-01-01 RX ORDER — DIPHENHYDRAMINE HYDROCHLORIDE 50 MG/ML
25 INJECTION, SOLUTION INTRAMUSCULAR; INTRAVENOUS AS NEEDED
Status: CANCELLED
Start: 2021-01-01

## 2021-01-01 RX ORDER — TAMSULOSIN HYDROCHLORIDE 0.4 MG/1
CAPSULE ORAL
Qty: 90 CAPSULE | Refills: 3 | Status: SHIPPED | OUTPATIENT
Start: 2021-01-01

## 2021-01-01 RX ORDER — POLYETHYLENE GLYCOL 3350 17 G/17G
17 POWDER, FOR SOLUTION ORAL 2 TIMES DAILY
Qty: 1530 G | Refills: 0 | Status: SHIPPED | OUTPATIENT
Start: 2021-01-01 | End: 2021-01-01

## 2021-01-01 RX ORDER — SODIUM CHLORIDE 0.9 % (FLUSH) 0.9 %
10 SYRINGE (ML) INJECTION AS NEEDED
Status: DISPENSED | OUTPATIENT
Start: 2021-01-01 | End: 2021-01-01

## 2021-01-01 RX ORDER — HEPARIN 100 UNIT/ML
300-500 SYRINGE INTRAVENOUS AS NEEDED
Status: DISCONTINUED | OUTPATIENT
Start: 2021-01-01 | End: 2021-01-01 | Stop reason: HOSPADM

## 2021-01-01 RX ORDER — CEPHALEXIN 500 MG/1
500 CAPSULE ORAL 4 TIMES DAILY
Qty: 28 CAPSULE | Refills: 0 | Status: SHIPPED | OUTPATIENT
Start: 2021-01-01 | End: 2021-01-01

## 2021-01-01 RX ORDER — SODIUM CHLORIDE 0.9 % (FLUSH) 0.9 %
5-10 SYRINGE (ML) INJECTION AS NEEDED
Status: DISPENSED | OUTPATIENT
Start: 2021-01-01 | End: 2021-01-01

## 2021-01-01 RX ORDER — ONDANSETRON 2 MG/ML
8 INJECTION INTRAMUSCULAR; INTRAVENOUS AS NEEDED
Status: CANCELLED | OUTPATIENT
Start: 2021-01-01

## 2021-01-01 RX ORDER — SODIUM CHLORIDE 0.9 % (FLUSH) 0.9 %
10 SYRINGE (ML) INJECTION AS NEEDED
Status: CANCELLED | OUTPATIENT
Start: 2021-01-01 | End: 2021-01-01

## 2021-01-01 RX ORDER — HYDROCORTISONE SODIUM SUCCINATE 100 MG/2ML
100 INJECTION, POWDER, FOR SOLUTION INTRAMUSCULAR; INTRAVENOUS AS NEEDED
Status: CANCELLED | OUTPATIENT
Start: 2021-01-01

## 2021-01-01 RX ORDER — HEPARIN 100 UNIT/ML
300-500 SYRINGE INTRAVENOUS AS NEEDED
Status: ACTIVE | OUTPATIENT
Start: 2021-01-01 | End: 2021-01-01

## 2021-01-01 RX ORDER — PALONOSETRON 0.05 MG/ML
0.25 INJECTION, SOLUTION INTRAVENOUS ONCE
Status: COMPLETED | OUTPATIENT
Start: 2021-01-01 | End: 2021-01-01

## 2021-01-01 RX ORDER — CYANOCOBALAMIN 1000 UG/ML
1000 INJECTION, SOLUTION INTRAMUSCULAR; SUBCUTANEOUS
Qty: 6 ML | Refills: 6
Start: 2021-01-01 | End: 2022-01-01 | Stop reason: ALTCHOICE

## 2021-01-01 RX ORDER — LIDOCAINE HYDROCHLORIDE 20 MG/ML
20 INJECTION, SOLUTION INFILTRATION; PERINEURAL ONCE
Status: COMPLETED | OUTPATIENT
Start: 2021-01-01 | End: 2021-01-01

## 2021-01-01 RX ORDER — HEPARIN 100 UNIT/ML
500 SYRINGE INTRAVENOUS AS NEEDED
Status: DISCONTINUED | OUTPATIENT
Start: 2021-01-01 | End: 2021-01-01 | Stop reason: HOSPADM

## 2021-01-01 RX ORDER — SODIUM CHLORIDE 9 MG/ML
10 INJECTION INTRAMUSCULAR; INTRAVENOUS; SUBCUTANEOUS AS NEEDED
Status: CANCELLED | OUTPATIENT
Start: 2021-01-01

## 2021-01-01 RX ORDER — SODIUM CHLORIDE 9 MG/ML
10 INJECTION INTRAMUSCULAR; INTRAVENOUS; SUBCUTANEOUS AS NEEDED
Status: DISCONTINUED | OUTPATIENT
Start: 2021-01-01 | End: 2021-01-01 | Stop reason: HOSPADM

## 2021-01-01 RX ORDER — SODIUM CHLORIDE 9 MG/ML
25 INJECTION, SOLUTION INTRAVENOUS CONTINUOUS
Status: DISPENSED | OUTPATIENT
Start: 2021-01-01 | End: 2021-01-01

## 2021-01-01 RX ORDER — OXYCODONE HYDROCHLORIDE 5 MG/1
5 TABLET ORAL
Qty: 21 TABLET | Refills: 0 | Status: SHIPPED | OUTPATIENT
Start: 2021-01-01 | End: 2021-01-01

## 2021-01-01 RX ORDER — PANCRELIPASE 36000; 180000; 114000 [USP'U]/1; [USP'U]/1; [USP'U]/1
CAPSULE, DELAYED RELEASE PELLETS ORAL
Qty: 1080 CAPSULE | Refills: 3 | Status: SHIPPED | OUTPATIENT
Start: 2021-01-01

## 2021-01-01 RX ORDER — AZITHROMYCIN 250 MG/1
TABLET, FILM COATED ORAL
COMMUNITY
Start: 2021-01-01 | End: 2022-01-01

## 2021-01-01 RX ORDER — HEPARIN 100 UNIT/ML
300-500 SYRINGE INTRAVENOUS AS NEEDED
Status: CANCELLED
Start: 2021-01-01

## 2021-01-01 RX ORDER — PALONOSETRON 0.05 MG/ML
0.25 INJECTION, SOLUTION INTRAVENOUS ONCE
Status: CANCELLED | OUTPATIENT
Start: 2021-01-01 | End: 2021-01-01

## 2021-01-01 RX ORDER — BUPIVACAINE HYDROCHLORIDE 5 MG/ML
20 INJECTION, SOLUTION EPIDURAL; INTRACAUDAL
Status: COMPLETED | OUTPATIENT
Start: 2021-01-01 | End: 2021-01-01

## 2021-01-01 RX ORDER — ACETAMINOPHEN 325 MG/1
650 TABLET ORAL AS NEEDED
Status: CANCELLED
Start: 2021-01-01

## 2021-01-01 RX ORDER — DUTASTERIDE 0.5 MG/1
CAPSULE, LIQUID FILLED ORAL
Qty: 90 CAPSULE | Refills: 3 | Status: SHIPPED | OUTPATIENT
Start: 2021-01-01

## 2021-01-01 RX ORDER — DIPHENOXYLATE HYDROCHLORIDE AND ATROPINE SULFATE 2.5; .025 MG/1; MG/1
1 TABLET ORAL
Qty: 90 TABLET | Refills: 5 | Status: SHIPPED | OUTPATIENT
Start: 2021-01-01 | End: 2021-01-01

## 2021-01-01 RX ORDER — SODIUM CHLORIDE 0.9 % (FLUSH) 0.9 %
10 SYRINGE (ML) INJECTION AS NEEDED
Status: DISCONTINUED | OUTPATIENT
Start: 2021-01-01 | End: 2021-01-01 | Stop reason: HOSPADM

## 2021-01-01 RX ORDER — SODIUM CHLORIDE 0.9 % (FLUSH) 0.9 %
5-10 SYRINGE (ML) INJECTION AS NEEDED
Status: DISCONTINUED | OUTPATIENT
Start: 2021-01-01 | End: 2021-01-01 | Stop reason: HOSPADM

## 2021-01-01 RX ORDER — FENTANYL CITRATE 50 UG/ML
50 INJECTION, SOLUTION INTRAMUSCULAR; INTRAVENOUS
Status: COMPLETED | OUTPATIENT
Start: 2021-01-01 | End: 2021-01-01

## 2021-01-01 RX ORDER — LANOLIN ALCOHOL/MO/W.PET/CERES
CREAM (GRAM) TOPICAL
Qty: 90 CAPSULE | Refills: 3 | Status: SHIPPED | OUTPATIENT
Start: 2021-01-01 | End: 2021-01-01

## 2021-01-01 RX ORDER — CLINDAMYCIN PHOSPHATE 900 MG/50ML
900 INJECTION INTRAVENOUS
Status: COMPLETED | OUTPATIENT
Start: 2021-01-01 | End: 2021-01-01

## 2021-01-01 RX ORDER — SODIUM CHLORIDE 0.9 % (FLUSH) 0.9 %
10 SYRINGE (ML) INJECTION AS NEEDED
Status: CANCELLED | OUTPATIENT
Start: 2021-01-01

## 2021-01-01 RX ORDER — FENTANYL CITRATE 50 UG/ML
200 INJECTION, SOLUTION INTRAMUSCULAR; INTRAVENOUS
Status: DISCONTINUED | OUTPATIENT
Start: 2021-01-01 | End: 2021-01-01

## 2021-01-01 RX ORDER — HYDROMORPHONE HYDROCHLORIDE 8 MG/1
8 TABLET ORAL
Qty: 120 TABLET | Refills: 0 | Status: SHIPPED | OUTPATIENT
Start: 2021-01-01 | End: 2022-01-01 | Stop reason: SDUPTHER

## 2021-01-01 RX ORDER — SODIUM CHLORIDE 0.9 % (FLUSH) 0.9 %
10-40 SYRINGE (ML) INJECTION AS NEEDED
Status: DISCONTINUED | OUTPATIENT
Start: 2021-01-01 | End: 2021-01-01 | Stop reason: HOSPADM

## 2021-01-01 RX ORDER — MORPHINE 10 MG/ML
1 TINCTURE ORAL
Qty: 118 ML | Refills: 0 | Status: SHIPPED | OUTPATIENT
Start: 2021-01-01 | End: 2022-01-01

## 2021-01-01 RX ORDER — MONTELUKAST SODIUM 4 MG/1
TABLET, CHEWABLE ORAL
COMMUNITY
Start: 2021-01-01 | End: 2022-01-01

## 2021-01-01 RX ORDER — SODIUM CHLORIDE 9 MG/ML
25 INJECTION, SOLUTION INTRAVENOUS CONTINUOUS
Status: CANCELLED | OUTPATIENT
Start: 2021-01-01 | End: 2021-01-01

## 2021-01-01 RX ORDER — HYDROMORPHONE HYDROCHLORIDE 1 MG/ML
1 INJECTION, SOLUTION INTRAMUSCULAR; INTRAVENOUS; SUBCUTANEOUS ONCE
Status: COMPLETED | OUTPATIENT
Start: 2021-01-01 | End: 2021-01-01

## 2021-01-01 RX ORDER — DEXAMETHASONE 4 MG/1
4 TABLET ORAL
Qty: 5 TABLET | Refills: 0 | Status: SHIPPED | OUTPATIENT
Start: 2021-01-01 | End: 2022-01-01 | Stop reason: ALTCHOICE

## 2021-01-01 RX ORDER — LEVOFLOXACIN 750 MG/1
750 TABLET ORAL DAILY
Qty: 7 TABLET | Refills: 0 | Status: SHIPPED | OUTPATIENT
Start: 2021-01-01 | End: 2021-01-01

## 2021-01-01 RX ORDER — HYDROMORPHONE HYDROCHLORIDE 2 MG/1
2 TABLET ORAL
Qty: 120 TABLET | Refills: 0 | Status: SHIPPED | OUTPATIENT
Start: 2021-01-01 | End: 2021-01-01

## 2021-01-01 RX ORDER — MORPHINE 10 MG/ML
1 TINCTURE ORAL
Qty: 118 ML | Refills: 0 | Status: SHIPPED | OUTPATIENT
Start: 2021-01-01 | End: 2021-01-01

## 2021-01-01 RX ORDER — LANOLIN ALCOHOL/MO/W.PET/CERES
325 CREAM (GRAM) TOPICAL
COMMUNITY

## 2021-01-01 RX ORDER — HEPARIN 100 UNIT/ML
500 SYRINGE INTRAVENOUS AS NEEDED
Status: ACTIVE | OUTPATIENT
Start: 2021-01-01 | End: 2021-01-01

## 2021-01-01 RX ORDER — FENTANYL CITRATE 50 UG/ML
100 INJECTION, SOLUTION INTRAMUSCULAR; INTRAVENOUS
Status: DISCONTINUED | OUTPATIENT
Start: 2021-01-01 | End: 2021-01-01 | Stop reason: HOSPADM

## 2021-01-01 RX ORDER — ACETAMINOPHEN 500 MG
1000 TABLET ORAL ONCE
Status: COMPLETED | OUTPATIENT
Start: 2021-01-01 | End: 2021-01-01

## 2021-01-01 RX ORDER — VITAMIN A 3000 MCG
CAPSULE ORAL
Qty: 90 CAPSULE | Refills: 3 | Status: SHIPPED | OUTPATIENT
Start: 2021-01-01

## 2021-01-01 RX ORDER — HYDROMORPHONE HYDROCHLORIDE 4 MG/1
4 TABLET ORAL
Qty: 120 TABLET | Refills: 0 | Status: SHIPPED | OUTPATIENT
Start: 2021-01-01 | End: 2021-01-01

## 2021-01-01 RX ORDER — MIDAZOLAM HYDROCHLORIDE 1 MG/ML
0-10 INJECTION, SOLUTION INTRAMUSCULAR; INTRAVENOUS
Status: DISCONTINUED | OUTPATIENT
Start: 2021-01-01 | End: 2021-01-01 | Stop reason: HOSPADM

## 2021-01-01 RX ORDER — AZELASTINE HCL 205.5 UG/1
2 SPRAY NASAL 2 TIMES DAILY
Qty: 1 BOTTLE | Refills: 0 | Status: SHIPPED | OUTPATIENT
Start: 2021-01-01 | End: 2022-01-01

## 2021-01-01 RX ORDER — PROCHLORPERAZINE MALEATE 10 MG
5 TABLET ORAL
Qty: 90 TABLET | Refills: 2 | Status: SHIPPED | OUTPATIENT
Start: 2021-01-01 | End: 2021-01-01

## 2021-01-01 RX ORDER — DICYCLOMINE HYDROCHLORIDE 10 MG/1
10 CAPSULE ORAL
Qty: 10 CAPSULE | Refills: 0 | Status: SHIPPED | OUTPATIENT
Start: 2021-01-01 | End: 2022-01-01 | Stop reason: CLARIF

## 2021-01-01 RX ORDER — LEVOFLOXACIN 750 MG/1
750 TABLET ORAL
Status: COMPLETED | OUTPATIENT
Start: 2021-01-01 | End: 2021-01-01

## 2021-01-01 RX ORDER — LIDOCAINE 50 MG/G
1 PATCH TOPICAL EVERY 24 HOURS
Qty: 1 EACH | Refills: 5 | Status: SHIPPED | OUTPATIENT
Start: 2021-01-01 | End: 2022-01-01 | Stop reason: SDUPTHER

## 2021-01-01 RX ORDER — OXYCODONE HCL 20 MG/1
20 TABLET, FILM COATED, EXTENDED RELEASE ORAL EVERY 12 HOURS
Qty: 60 TABLET | Refills: 0 | Status: SHIPPED | OUTPATIENT
Start: 2021-01-01 | End: 2022-01-01

## 2021-01-01 RX ORDER — DIPHENOXYLATE HYDROCHLORIDE AND ATROPINE SULFATE 2.5; .025 MG/1; MG/1
1 TABLET ORAL
Qty: 60 TABLET | Refills: 0 | Status: SHIPPED | OUTPATIENT
Start: 2021-01-01 | End: 2022-01-01 | Stop reason: ALTCHOICE

## 2021-01-01 RX ORDER — HYDROMORPHONE HYDROCHLORIDE 4 MG/1
8 TABLET ORAL
Qty: 240 TABLET | Refills: 0 | Status: SHIPPED | OUTPATIENT
Start: 2021-01-01 | End: 2021-01-01

## 2021-01-01 RX ORDER — HYDROMORPHONE HYDROCHLORIDE 2 MG/1
2 TABLET ORAL
Qty: 120 TABLET | Refills: 0 | Status: CANCELLED | OUTPATIENT
Start: 2021-01-01 | End: 2021-01-01

## 2021-01-01 RX ORDER — KETOROLAC TROMETHAMINE 30 MG/ML
15 INJECTION, SOLUTION INTRAMUSCULAR; INTRAVENOUS
Status: COMPLETED | OUTPATIENT
Start: 2021-01-01 | End: 2021-01-01

## 2021-01-01 RX ORDER — SODIUM CHLORIDE 9 MG/ML
1000 INJECTION, SOLUTION INTRAVENOUS ONCE
Status: COMPLETED | OUTPATIENT
Start: 2021-01-01 | End: 2021-01-01

## 2021-01-01 RX ORDER — SODIUM CHLORIDE 9 MG/ML
25 INJECTION, SOLUTION INTRAVENOUS CONTINUOUS
Status: CANCELLED | OUTPATIENT
Start: 2021-01-01

## 2021-01-01 RX ORDER — ONDANSETRON 4 MG/1
4 TABLET, ORALLY DISINTEGRATING ORAL
Qty: 10 TABLET | Refills: 0 | Status: SHIPPED | OUTPATIENT
Start: 2021-01-01

## 2021-01-01 RX ORDER — MIDAZOLAM HYDROCHLORIDE 1 MG/ML
10 INJECTION, SOLUTION INTRAMUSCULAR; INTRAVENOUS
Status: DISCONTINUED | OUTPATIENT
Start: 2021-01-01 | End: 2021-01-01

## 2021-01-01 RX ORDER — HYDROMORPHONE HYDROCHLORIDE 4 MG/1
4 TABLET ORAL
Qty: 120 TABLET | Refills: 0 | Status: SHIPPED | OUTPATIENT
Start: 2021-01-01 | End: 2021-01-01 | Stop reason: SDUPTHER

## 2021-01-01 RX ORDER — SODIUM CHLORIDE 9 MG/ML
10 INJECTION INTRAMUSCULAR; INTRAVENOUS; SUBCUTANEOUS AS NEEDED
Status: DISPENSED | OUTPATIENT
Start: 2021-01-01 | End: 2021-01-01

## 2021-01-01 RX ORDER — DIPHENOXYLATE HYDROCHLORIDE AND ATROPINE SULFATE 2.5; .025 MG/1; MG/1
1 TABLET ORAL
Qty: 90 TAB | Refills: 5 | Status: SHIPPED | OUTPATIENT
Start: 2021-01-01 | End: 2021-01-01 | Stop reason: SDUPTHER

## 2021-01-01 RX ORDER — LEVOFLOXACIN 750 MG/1
750 TABLET ORAL DAILY
Qty: 10 TABLET | Refills: 0 | Status: SHIPPED | OUTPATIENT
Start: 2021-01-01 | End: 2021-01-01

## 2021-01-01 RX ORDER — BARIUM SULFATE 20 MG/ML
900 SUSPENSION ORAL
Status: COMPLETED | OUTPATIENT
Start: 2021-01-01 | End: 2021-01-01

## 2021-01-01 RX ADMIN — BUPIVACAINE HYDROCHLORIDE 100 MG: 5 INJECTION, SOLUTION EPIDURAL; INTRACAUDAL; PERINEURAL at 13:04

## 2021-01-01 RX ADMIN — DENOSUMAB 120 MG: 120 INJECTION SUBCUTANEOUS at 16:21

## 2021-01-01 RX ADMIN — Medication 20 ML: at 11:12

## 2021-01-01 RX ADMIN — GEMCITABINE 1448 MG: 38 INJECTION, SOLUTION INTRAVENOUS at 12:10

## 2021-01-01 RX ADMIN — FENTANYL CITRATE 50 MCG: 50 INJECTION, SOLUTION INTRAMUSCULAR; INTRAVENOUS at 13:57

## 2021-01-01 RX ADMIN — Medication 10 ML: at 13:17

## 2021-01-01 RX ADMIN — SODIUM CHLORIDE 10 ML: 9 INJECTION INTRAMUSCULAR; INTRAVENOUS; SUBCUTANEOUS at 15:48

## 2021-01-01 RX ADMIN — SODIUM CHLORIDE 1000 ML: 900 INJECTION, SOLUTION INTRAVENOUS at 10:05

## 2021-01-01 RX ADMIN — Medication 10 ML: at 13:50

## 2021-01-01 RX ADMIN — Medication 10 ML: at 15:00

## 2021-01-01 RX ADMIN — FENTANYL CITRATE 50 MCG: 50 INJECTION, SOLUTION INTRAMUSCULAR; INTRAVENOUS at 13:32

## 2021-01-01 RX ADMIN — IOHEXOL 50 ML: 240 INJECTION, SOLUTION INTRATHECAL; INTRAVASCULAR; INTRAVENOUS; ORAL at 10:03

## 2021-01-01 RX ADMIN — SODIUM CHLORIDE 1000 ML: 9 INJECTION, SOLUTION INTRAVENOUS at 16:18

## 2021-01-01 RX ADMIN — DENOSUMAB 120 MG: 120 INJECTION SUBCUTANEOUS at 14:05

## 2021-01-01 RX ADMIN — HEPARIN 500 UNITS: 100 SYRINGE at 15:48

## 2021-01-01 RX ADMIN — Medication 10 ML: at 11:49

## 2021-01-01 RX ADMIN — Medication 10 ML: at 10:19

## 2021-01-01 RX ADMIN — DEXAMETHASONE SODIUM PHOSPHATE 12 MG: 4 INJECTION, SOLUTION INTRA-ARTICULAR; INTRALESIONAL; INTRAMUSCULAR; INTRAVENOUS; SOFT TISSUE at 11:05

## 2021-01-01 RX ADMIN — HYDROMORPHONE HYDROCHLORIDE 1 MG: 1 INJECTION, SOLUTION INTRAMUSCULAR; INTRAVENOUS; SUBCUTANEOUS at 10:13

## 2021-01-01 RX ADMIN — PACLITAXEL 181 MG: 100 INJECTION, POWDER, LYOPHILIZED, FOR SUSPENSION INTRAVENOUS at 13:07

## 2021-01-01 RX ADMIN — Medication 10 ML: at 15:16

## 2021-01-01 RX ADMIN — SODIUM CHLORIDE 1000 ML: 900 INJECTION, SOLUTION INTRAVENOUS at 15:20

## 2021-01-01 RX ADMIN — LIDOCAINE HYDROCHLORIDE 400 MG: 20 INJECTION, SOLUTION INFILTRATION; PERINEURAL at 13:04

## 2021-01-01 RX ADMIN — Medication 500 UNITS: at 16:20

## 2021-01-01 RX ADMIN — DENOSUMAB 120 MG: 120 INJECTION SUBCUTANEOUS at 11:11

## 2021-01-01 RX ADMIN — HEPARIN 500 UNITS: 100 SYRINGE at 14:22

## 2021-01-01 RX ADMIN — SODIUM CHLORIDE 10 ML: 9 INJECTION INTRAMUSCULAR; INTRAVENOUS; SUBCUTANEOUS at 16:25

## 2021-01-01 RX ADMIN — Medication 10 ML: at 16:23

## 2021-01-01 RX ADMIN — HEPARIN 500 UNITS: 100 SYRINGE at 15:20

## 2021-01-01 RX ADMIN — Medication 10 ML: at 09:15

## 2021-01-01 RX ADMIN — Medication 10 ML: at 15:05

## 2021-01-01 RX ADMIN — HEPARIN 500 UNITS: 100 SYRINGE at 16:19

## 2021-01-01 RX ADMIN — GEMCITABINE 1448 MG: 38 INJECTION, SOLUTION INTRAVENOUS at 13:57

## 2021-01-01 RX ADMIN — SODIUM CHLORIDE 1000 ML: 900 INJECTION, SOLUTION INTRAVENOUS at 13:17

## 2021-01-01 RX ADMIN — Medication 500 UNITS: at 16:30

## 2021-01-01 RX ADMIN — SODIUM CHLORIDE 150 MG: 900 INJECTION, SOLUTION INTRAVENOUS at 10:40

## 2021-01-01 RX ADMIN — IOHEXOL 50 ML: 240 INJECTION, SOLUTION INTRATHECAL; INTRAVASCULAR; INTRAVENOUS; ORAL at 11:42

## 2021-01-01 RX ADMIN — Medication 500 UNITS: at 11:10

## 2021-01-01 RX ADMIN — DENOSUMAB 120 MG: 120 INJECTION SUBCUTANEOUS at 10:03

## 2021-01-01 RX ADMIN — Medication 10 ML: at 15:18

## 2021-01-01 RX ADMIN — SODIUM CHLORIDE 10 ML: 9 INJECTION INTRAMUSCULAR; INTRAVENOUS; SUBCUTANEOUS at 15:05

## 2021-01-01 RX ADMIN — HYDROMORPHONE HYDROCHLORIDE 1 MG: 1 INJECTION, SOLUTION INTRAMUSCULAR; INTRAVENOUS; SUBCUTANEOUS at 11:13

## 2021-01-01 RX ADMIN — HEPARIN 500 UNITS: 100 SYRINGE at 16:10

## 2021-01-01 RX ADMIN — SODIUM CHLORIDE 10 ML: 9 INJECTION INTRAMUSCULAR; INTRAVENOUS; SUBCUTANEOUS at 15:04

## 2021-01-01 RX ADMIN — SODIUM CHLORIDE 10 ML: 9 INJECTION INTRAMUSCULAR; INTRAVENOUS; SUBCUTANEOUS at 09:05

## 2021-01-01 RX ADMIN — Medication 10 ML: at 10:00

## 2021-01-01 RX ADMIN — SODIUM CHLORIDE 1000 ML: 900 INJECTION, SOLUTION INTRAVENOUS at 16:23

## 2021-01-01 RX ADMIN — CISPLATIN 45.3 MG: 1 INJECTION INTRAVENOUS at 12:45

## 2021-01-01 RX ADMIN — IOPAMIDOL 100 ML: 755 INJECTION, SOLUTION INTRAVENOUS at 10:03

## 2021-01-01 RX ADMIN — IOPAMIDOL 100 ML: 755 INJECTION, SOLUTION INTRAVENOUS at 09:08

## 2021-01-01 RX ADMIN — DENOSUMAB 120 MG: 120 INJECTION SUBCUTANEOUS at 10:14

## 2021-01-01 RX ADMIN — IOHEXOL 50 ML: 240 INJECTION, SOLUTION INTRATHECAL; INTRAVASCULAR; INTRAVENOUS; ORAL at 11:10

## 2021-01-01 RX ADMIN — Medication 10 ML: at 14:21

## 2021-01-01 RX ADMIN — SODIUM CHLORIDE 1000 ML: 900 INJECTION, SOLUTION INTRAVENOUS at 15:05

## 2021-01-01 RX ADMIN — LEVOFLOXACIN 750 MG: 750 TABLET, FILM COATED ORAL at 19:34

## 2021-01-01 RX ADMIN — MAGNESIUM SULFATE HEPTAHYDRATE: 500 INJECTION, SOLUTION INTRAMUSCULAR; INTRAVENOUS at 09:35

## 2021-01-01 RX ADMIN — HEPARIN 500 UNITS: 100 SYRINGE at 13:50

## 2021-01-01 RX ADMIN — SODIUM CHLORIDE 25 ML/HR: 900 INJECTION, SOLUTION INTRAVENOUS at 10:35

## 2021-01-01 RX ADMIN — SODIUM CHLORIDE 1000 ML: 900 INJECTION, SOLUTION INTRAVENOUS at 15:00

## 2021-01-01 RX ADMIN — FENTANYL CITRATE 50 MCG: 50 INJECTION INTRAMUSCULAR; INTRAVENOUS at 02:11

## 2021-01-01 RX ADMIN — ONDANSETRON 4 MG: 2 INJECTION INTRAMUSCULAR; INTRAVENOUS at 10:09

## 2021-01-01 RX ADMIN — Medication 10 ML: at 16:20

## 2021-01-01 RX ADMIN — Medication 500 UNITS: at 15:55

## 2021-01-01 RX ADMIN — Medication 500 UNITS: at 11:12

## 2021-01-01 RX ADMIN — SODIUM CHLORIDE 1000 ML: 9 INJECTION, SOLUTION INTRAVENOUS at 10:13

## 2021-01-01 RX ADMIN — Medication 20 ML: at 15:20

## 2021-01-01 RX ADMIN — SODIUM CHLORIDE 10 ML: 9 INJECTION, SOLUTION INTRAMUSCULAR; INTRAVENOUS; SUBCUTANEOUS at 09:05

## 2021-01-01 RX ADMIN — SODIUM CHLORIDE 150 MG: 900 INJECTION, SOLUTION INTRAVENOUS at 12:34

## 2021-01-01 RX ADMIN — SODIUM CHLORIDE 1000 ML: 900 INJECTION, SOLUTION INTRAVENOUS at 10:45

## 2021-01-01 RX ADMIN — SODIUM CHLORIDE 1000 ML: 9 INJECTION, SOLUTION INTRAVENOUS at 02:09

## 2021-01-01 RX ADMIN — MIDAZOLAM 2 MG: 1 INJECTION INTRAMUSCULAR; INTRAVENOUS at 13:49

## 2021-01-01 RX ADMIN — SODIUM CHLORIDE 1000 ML: 900 INJECTION, SOLUTION INTRAVENOUS at 10:01

## 2021-01-01 RX ADMIN — GADOTERIDOL 13 ML: 279.3 INJECTION, SOLUTION INTRAVENOUS at 09:40

## 2021-01-01 RX ADMIN — HEPARIN 500 UNITS: 100 SYRINGE at 10:19

## 2021-01-01 RX ADMIN — Medication 500 UNITS: at 17:25

## 2021-01-01 RX ADMIN — DENOSUMAB 120 MG: 120 INJECTION SUBCUTANEOUS at 15:49

## 2021-01-01 RX ADMIN — PALONOSETRON 0.25 MG: 0.05 INJECTION, SOLUTION INTRAVENOUS at 10:35

## 2021-01-01 RX ADMIN — Medication 10 ML: at 16:19

## 2021-01-01 RX ADMIN — HEPARIN 500 UNITS: 100 SYRINGE at 16:05

## 2021-01-01 RX ADMIN — SODIUM CHLORIDE 1000 ML: 900 INJECTION, SOLUTION INTRAVENOUS at 15:15

## 2021-01-01 RX ADMIN — CLINDAMYCIN PHOSPHATE 900 MG: 900 INJECTION, SOLUTION INTRAVENOUS at 12:46

## 2021-01-01 RX ADMIN — FENTANYL CITRATE 50 MCG: 50 INJECTION, SOLUTION INTRAMUSCULAR; INTRAVENOUS at 13:14

## 2021-01-01 RX ADMIN — DENOSUMAB 120 MG: 120 INJECTION SUBCUTANEOUS at 15:29

## 2021-01-01 RX ADMIN — Medication 10 ML: at 15:15

## 2021-01-01 RX ADMIN — HEPARIN 500 UNITS: 100 SYRINGE at 15:15

## 2021-01-01 RX ADMIN — Medication 10 ML: at 09:05

## 2021-01-01 RX ADMIN — Medication 30 ML: at 15:55

## 2021-01-01 RX ADMIN — SODIUM CHLORIDE 10 ML: 9 INJECTION INTRAMUSCULAR; INTRAVENOUS; SUBCUTANEOUS at 15:13

## 2021-01-01 RX ADMIN — Medication 500 UNITS: at 16:26

## 2021-01-01 RX ADMIN — Medication 10 ML: at 09:10

## 2021-01-01 RX ADMIN — ACETAMINOPHEN 1000 MG: 500 TABLET ORAL at 16:23

## 2021-01-01 RX ADMIN — IOPAMIDOL 100 ML: 755 INJECTION, SOLUTION INTRAVENOUS at 11:41

## 2021-01-01 RX ADMIN — SODIUM CHLORIDE 30 ML: 9 INJECTION INTRAMUSCULAR; INTRAVENOUS; SUBCUTANEOUS at 11:10

## 2021-01-01 RX ADMIN — SODIUM CHLORIDE 25 ML/HR: 9 INJECTION, SOLUTION INTRAVENOUS at 11:48

## 2021-01-01 RX ADMIN — IOPAMIDOL 100 ML: 755 INJECTION, SOLUTION INTRAVENOUS at 11:10

## 2021-01-01 RX ADMIN — HEPARIN 500 UNITS: 100 SYRINGE at 11:49

## 2021-01-01 RX ADMIN — SODIUM CHLORIDE 10 ML: 9 INJECTION INTRAMUSCULAR; INTRAVENOUS; SUBCUTANEOUS at 16:05

## 2021-01-01 RX ADMIN — Medication 10 ML: at 16:33

## 2021-01-01 RX ADMIN — MIDAZOLAM 3 MG: 1 INJECTION INTRAMUSCULAR; INTRAVENOUS at 13:10

## 2021-01-01 RX ADMIN — MIDAZOLAM 2 MG: 1 INJECTION INTRAMUSCULAR; INTRAVENOUS at 13:16

## 2021-01-01 RX ADMIN — PACLITAXEL 181 MG: 100 INJECTION, POWDER, LYOPHILIZED, FOR SUSPENSION INTRAVENOUS at 11:25

## 2021-01-01 RX ADMIN — DEXAMETHASONE SODIUM PHOSPHATE 12 MG: 4 INJECTION, SOLUTION INTRA-ARTICULAR; INTRALESIONAL; INTRAMUSCULAR; INTRAVENOUS; SOFT TISSUE at 11:56

## 2021-01-01 RX ADMIN — DIPHENHYDRAMINE HYDROCHLORIDE 50 MG: 50 INJECTION, SOLUTION INTRAMUSCULAR; INTRAVENOUS at 12:46

## 2021-01-01 RX ADMIN — SODIUM CHLORIDE 10 ML: 9 INJECTION, SOLUTION INTRAMUSCULAR; INTRAVENOUS; SUBCUTANEOUS at 13:15

## 2021-01-01 RX ADMIN — SODIUM CHLORIDE 10 ML: 9 INJECTION, SOLUTION INTRAMUSCULAR; INTRAVENOUS; SUBCUTANEOUS at 16:23

## 2021-01-01 RX ADMIN — DENOSUMAB 120 MG: 120 INJECTION SUBCUTANEOUS at 15:32

## 2021-01-01 RX ADMIN — IOPAMIDOL 100 ML: 755 INJECTION, SOLUTION INTRAVENOUS at 02:38

## 2021-01-01 RX ADMIN — SODIUM CHLORIDE 10 ML: 9 INJECTION, SOLUTION INTRAMUSCULAR; INTRAVENOUS; SUBCUTANEOUS at 09:10

## 2021-01-01 RX ADMIN — PALONOSETRON 0.25 MG: 0.05 INJECTION, SOLUTION INTRAVENOUS at 11:49

## 2021-01-01 RX ADMIN — CISPLATIN 45.3 MG: 1 INJECTION INTRAVENOUS at 14:43

## 2021-01-01 RX ADMIN — SODIUM CHLORIDE 1000 ML: 900 INJECTION, SOLUTION INTRAVENOUS at 15:23

## 2021-01-01 RX ADMIN — ONDANSETRON 4 MG: 2 INJECTION INTRAMUSCULAR; INTRAVENOUS at 02:14

## 2021-01-01 RX ADMIN — Medication 500 UNITS: at 10:00

## 2021-01-01 RX ADMIN — Medication 10 ML: at 16:09

## 2021-01-01 RX ADMIN — BARIUM SULFATE 900 ML: 20 SUSPENSION ORAL at 09:00

## 2021-01-01 RX ADMIN — KETOROLAC TROMETHAMINE 30 MG: 30 INJECTION, SOLUTION INTRAMUSCULAR at 12:46

## 2021-01-01 RX ADMIN — SODIUM CHLORIDE 10 ML: 9 INJECTION, SOLUTION INTRAMUSCULAR; INTRAVENOUS; SUBCUTANEOUS at 09:15

## 2021-01-01 RX ADMIN — MAGNESIUM SULFATE HEPTAHYDRATE: 500 INJECTION, SOLUTION INTRAMUSCULAR; INTRAVENOUS at 10:38

## 2021-01-01 NOTE — ANESTHESIA POSTPROCEDURE EVALUATION
Post-Anesthesia Evaluation and Assessment    Patient: Denzel Mcmillan MRN: [id number]  SSN: [id number]    YOB: 1955  Age: 60 y.o. Sex: male      I have evaluated the patient and they are stable and ready for discharge from the PACU. Cardiovascular Function/Vital Signs  Visit Vitals  /63 (BP 1 Location: Right arm, BP Patient Position: At rest)   Pulse 77   Temp 36.9 °C (98.4 °F)   Resp 16   SpO2 95%       Patient is status post MAC anesthesia for Procedure(s):  ESOPHAGOGASTRODUODENOSCOPY (EGD)   :-.    Nausea/Vomiting: None    Postoperative hydration reviewed and adequate. Pain:  Pain Scale 1: Numeric (0 - 10) (12/31/20 0820)  Pain Intensity 1: 6 (12/31/20 0820)   Managed    Neurological Status: At baseline    Mental Status, Level of Consciousness: Alert and  oriented to person, place, and time    Pulmonary Status:   O2 Device: Room air (12/31/20 0145)   Adequate oxygenation and airway patent    Complications related to anesthesia: None    Post-anesthesia assessment completed.  No concerns    Signed By: Kyle Jacobs MD     December 31, 2020              Procedure(s):  ESOPHAGOGASTRODUODENOSCOPY (EGD)   :-.    MAC    <BSHSIANPOST>    INITIAL Post-op Vital signs:   Vitals Value Taken Time   /63 12/31/20 1128   Temp 36.9 °C (98.4 °F) 12/31/20 1128   Pulse 77 12/31/20 1128   Resp 16 12/31/20 1128   SpO2 95 % 12/31/20 1128

## 2021-01-04 ENCOUNTER — PATIENT OUTREACH (OUTPATIENT)
Dept: OTHER | Age: 66
End: 2021-01-04

## 2021-01-04 DIAGNOSIS — C22.1 CHOLANGIOCARCINOMA (HCC): ICD-10-CM

## 2021-01-04 RX ORDER — HYDROMORPHONE HYDROCHLORIDE 8 MG/1
8 TABLET ORAL
Qty: 90 TAB | Refills: 0 | Status: SHIPPED | OUTPATIENT
Start: 2021-01-04 | End: 2021-01-19

## 2021-01-04 NOTE — PROGRESS NOTES
Patient with cholangiocarcinoma s/p pylorus-preserving Whipple 10/2017; G_J George-EN Y revision 09/02/2020, HTN, DM, BPH and chronic LBP  * IP stay 12/29 - 12/31  - intractable pain ;  Celiac plexus block/ palliative care. -Progressive stage IV disease- L5 metastasis, growth in paraaortic mass, worsening pain and small worrisome lung nodules.     * PICC to be placed Friday planned- if chemo ordered. - Apt with Jaron Thurs.     - Anticipates chemo/  Awaiting genetic wkup of pathology samples. - Unclear if he will need a new biopsy. * appetite is better. Gaining wt at home   - Pain is managed with palliative. * Chemo plan is awaiting genetics workup for leison. * IR doc - offers ablation for met on L5.     - Not ordered yet, but plans reviewed   - Same location as surgery in the past.  * Wife requests her complaints of insurance denial of PET scans be escalated to .     - Explained strong firewall between [facility] and .     - Explicit request from Mrs. Cristina to voice her complaints of denied treatment. * Denied twice PET scan. (Oct / and Dec)    - She knows Peer to peer failed. * Patient and wife request excalation to  of .     - CM sources her supervisor/ presents summary of Mr. Cristina's denied PET/ CT scans.       - Mrs. Cristina called/ approved my summary and CM gave her HR contact to registar her complaints: The number for [address] is [phone] /  or you can enter a case through [facility]ADE[facility] (new version of Iris)  there is a blue tile HR ServiceNow   which will lead you to a new page. There you click on the tile  Workplace worry- Advise and  and enter details/ summary of your issue. Additional needs identified to be addressed with provider no  none  Discussed COVID-19 related testing which was not done at this time. Test results were not done. Patient informed of results, if available?  NA        Method of communication with provider : none    Care Transition Nurse (CTN) contacted the patient by telephone to follow up after admission. Verified name and  with patient as identifiers. Addressed changes since last contact: Patient and wife request CM escalate to  complaints of denial of PET scan  Discharge needs reviewed: none None  Follow up appointment completed? Scheduled NA Was follow up appointment scheduled within 7 days of discharge? yes     Advance Care Planning:   Does patient have an Advance Directive:  reviewed and current     CTN reviewed discharge instructions, medical action plan and red flags with patient and discussed any barriers to care and/or understanding of plan of care after discharge. Discussed appropriate site of care based on symptoms and resources available to patient including: Patient has services in place. The patient agrees to contact the PCP office for questions related to their healthcare. Patients top risk factors for readmission: medical condition, medication management and Pain level metastatic cancer  Interventions to address risk factors: Obtained and reviewed discharge summary and/or continuity of care documents, Reviewed and followed up on pending diagnostic tests and treatments-IR option , Education of patient/family/caregiver/guardian to support self-management-For PICC placement Fri, Assessment and support for treatment adherence and medication management-Palliative care for pain mgmt and Benefits managment complaints    [address] follow up appointment(s):   Future Appointments   Date Time Provider Sloane Roach   2021 11:30 AM Donna Mooney MD ONC BS AMB   2021 11:30 AM Ras Singer MD [facility] BS AMB       Plan for follow-up call in 3-5 days based on severity of symptoms and risk factors.   Plan for next call: symptom management-pain level/ appitite and POC after Onc visit/ PICC placement  CTN provided contact information for future needs.    Goals Addressed                 This Visit's Progress       Patient Stated    Tjjake Lewis 125 for chronic LBP since surgery. (pt-stated)        Patient with cholangiocarcinoma s/p pylorus-preserving Whipple 10/2017; G_J George-EN Y revision 09/02/2020, HTN, DM, BPH and chronic LBP. * Patient will be encouraged to attend physician's apt for evaluation of causes/ sources of pain with diagnosis and treatment options. * CM will support evaluation by PCP/ any referral to PT,  ortho or rheumatology for accurate assessment of pain causes; treatment options; self management. *Assess pain level by scale 1-10;  * Encourage patient to use tylenol  and prescribed lidocaine and fentanyl patch to keep pain under control;  * Encourage patient to warm soaks / ice and compression garment for pain management. - to determine what helps. * Use relaxation breathing techniques and distraction techniques as alternatives to medications when possible. 10/26  *  Abd - pancreatic pain reduced 90-95% - since surgery.      - No N/V since surgery. - Gained 10 pounds this month. *  Lower back pain - No scan yet- unknown cause. - Followed by Palliative for pain control.     - Lidocaine pain patch helps at night, able to sleep. -  Son-in-law is PT and has suggested some exercises- but has not helped. - Doesn't hurt when walking, moving- harder in the evenings just sitting. 11/20    LB Pain is managed- \"took a week to get the medicine right. \"   - Pending apt with Dr. Carmen Ku for evaluation of back pain. He is reviewing    - Mr. Sonia Hernandez identifies one specific spot on his back- no radiating pain- No imaging of this area. * Surgery for hernia repair second week in Dec.  No specific date yet. * No N/V since surgery! - Awaiting insurance approval for PET scan for comparison and tracking cholangiocarcinoma.     * CM asks about DM/ HTN   - Patient reports that his blood sugar and blood pressure are, \"Right where they should be. \"   A1C 5.5 10/15    12/10-  Hernia repair for 12/11   * Pain meds with Palliative have been helpful. - Pain at that spot in his back as well as herniated site. - PET scan planned for Jan to r/o CA before referring for epidural steroid shot. 12/31 -   IP stay 12/29 - 12/31  - intractable pain ;  Celiac plexus block/ palliative care. LBP-  L5 metastasis, growth in paraaortic mass, worsening pain and small worrisome lung nodules-  RFA/ Kypho with IR and will start Dexamethasone 2 mg BID.    1/4 - home - pain is managed with Palliative Care. Apt Onc Thursday/  for PICC placement Friday. May have IR to ablate leison on L5. Hoping for immunotherapy as an option. Other     Attends follow-up appointments as directed. Patient with cholangiocarcinoma s/p pylorus-preserving Whipple 10/2017; G_J George-EN Y revision 09/02/2020, HTN, DM, BPH and chronic LBP  * IP stay 12/29 - 12/31  ; Celiac plexus block/ palliative care. -Progressive stage IV disease- L5 metastasis, growth in paraaortic mass, worsening pain and small worrisome lung nodules    12/31-  For discharge today. * Apt with Onc Thursday.    - hopes for genetic workup from pathology to identify future treatment options. - For Port-a-cath insertion anticipating chemotherapy. * Apt with Rad Onc for possible palliative treatment. 1/4 - doing well at home/ Apt with Onc Thurs/  PICC placement Fri. IR for L5 leison is a possibility. CONTINUE these medications which have CHANGED     Details   oxyCODONE ER (OxyCONTIN) 40 mg ER tablet Take 1 Tab by mouth every eight (8) hours for 30 days.  Max Daily Amount: 120 mg.  Qty: 30 Tab, Refills: 0     Associated Diagnoses: Intractable abdominal pain; Cholangiocarcinoma of biliary tract (HCC)       dexAMETHasone (DECADRON) 1 mg tablet Take 2 tablets by mouth twice daily for 14 days  Qty: 56 Tab, Refills: 0

## 2021-01-04 NOTE — TELEPHONE ENCOUNTER
Triage for Controlled Substance Refill Request     Pain Diagnosis: _Cholangiocarcinoma     Last Outpatient Visit: 12/2/2020     Next Outpatient Visit: 1/13/2020     Reason for refill needed outside of office visit? -Appointment not scheduled prior to need for scheduled refill        [id number] [address]: _         Medication: Dilaudid 4 mg   Dose and directions:2 tab by mouth every 4 hours.   Number dispensed:120  Date filled ( or Pharmacy):12/2/2020  #left:36      reviewed: yes      Date of Urine Drug Screen: n/a      Opioid Safety Handout given: yes      Appropriate for refill: yes      Action: yes , please refill

## 2021-01-06 ENCOUNTER — TELEPHONE (OUTPATIENT)
Dept: ONCOLOGY | Age: 66
End: 2021-01-06

## 2021-01-06 DIAGNOSIS — C22.1 CHOLANGIOCARCINOMA OF BILIARY TRACT (HCC): Primary | ICD-10-CM

## 2021-01-06 DIAGNOSIS — R93.7 ABNORMAL MRI, LUMBAR SPINE: ICD-10-CM

## 2021-01-06 NOTE — PROGRESS NOTES
Tom Gallegos. is a 60 y.o. male here for follow up for cholangiocarcinoma. He recently went to ER for abdominal pain. Imaging done and celiac block done 12/30/20.    1. Have you been to the ER, urgent care clinic since your last visit? Hospitalized since your last visit? no    2. Have you seen or consulted any other health care providers outside of the [address] [place] [address] since your last visit? Include any pap smears or colon screening. no    Pt states he is eating well. Has gained about 5 lbs past week. He is having some lower back pain that will be addressed next week with Kyphoplasty.

## 2021-01-07 ENCOUNTER — HOSPITAL ENCOUNTER (OUTPATIENT)
Dept: PREADMISSION TESTING | Age: 66
Discharge: HOME | End: 2021-01-07
Payer: COMMERCIAL

## 2021-01-07 ENCOUNTER — OFFICE VISIT (OUTPATIENT)
Dept: ONCOLOGY | Age: 66
End: 2021-01-07
Payer: COMMERCIAL

## 2021-01-07 ENCOUNTER — TRANSCRIBE ORDER (OUTPATIENT)
Dept: REGISTRATION | Age: 66
End: 2021-01-07

## 2021-01-07 ENCOUNTER — PATIENT OUTREACH (OUTPATIENT)
Dept: OTHER | Age: 66
End: 2021-01-07

## 2021-01-07 VITALS
HEIGHT: 68 IN | TEMPERATURE: 98.4 F | BODY MASS INDEX: 22.91 KG/M2 | HEART RATE: 76 BPM | SYSTOLIC BLOOD PRESSURE: 122 MMHG | DIASTOLIC BLOOD PRESSURE: 73 MMHG | OXYGEN SATURATION: 96 % | WEIGHT: 151.2 LBS

## 2021-01-07 DIAGNOSIS — Z01.812 PRE-PROCEDURE LAB EXAM: Primary | ICD-10-CM

## 2021-01-07 DIAGNOSIS — C22.1 CHOLANGIOCARCINOMA OF BILIARY TRACT (HCC): Primary | ICD-10-CM

## 2021-01-07 DIAGNOSIS — R11.2 CHEMOTHERAPY INDUCED NAUSEA AND VOMITING: ICD-10-CM

## 2021-01-07 DIAGNOSIS — T45.1X5A CHEMOTHERAPY INDUCED NAUSEA AND VOMITING: ICD-10-CM

## 2021-01-07 DIAGNOSIS — C79.51 METASTATIC CANCER TO BONE (HCC): ICD-10-CM

## 2021-01-07 DIAGNOSIS — Z01.812 PRE-PROCEDURE LAB EXAM: ICD-10-CM

## 2021-01-07 PROCEDURE — 87635 SARS-COV-2 COVID-19 AMP PRB: CPT

## 2021-01-07 PROCEDURE — 99215 OFFICE O/P EST HI 40 MIN: CPT | Performed by: INTERNAL MEDICINE

## 2021-01-07 RX ORDER — ONDANSETRON 4 MG/1
4 TABLET, ORALLY DISINTEGRATING ORAL
Qty: 40 TAB | Refills: 2 | Status: SHIPPED | OUTPATIENT
Start: 2021-01-07 | End: 2021-01-01

## 2021-01-07 RX ORDER — LIDOCAINE AND PRILOCAINE 25; 25 MG/G; MG/G
CREAM TOPICAL AS NEEDED
Qty: 30 G | Refills: 3 | Status: SHIPPED | OUTPATIENT
Start: 2021-01-07 | End: 2022-01-01 | Stop reason: ALTCHOICE

## 2021-01-07 RX ORDER — PROCHLORPERAZINE MALEATE 10 MG
5 TABLET ORAL
Qty: 40 TAB | Refills: 2 | Status: SHIPPED | OUTPATIENT
Start: 2021-01-07 | End: 2021-03-08

## 2021-01-07 NOTE — LETTER
1/7/2021 Patient: Becky Montejo. YOB: 1955 Date of Visit: 1/7/2021 Krissy Love DO 
[address] [facility] [address] [address] [address] [employer] Dear Krissy Love DO, Thank you for referring Mr. Brennen Jameson to [address] [place] [address] for evaluation. My notes for this consultation are attached. If you have questions, please do not hesitate to call me. I look forward to following your patient along with you.  
 
 
Sincerely, 
 
Deny Vega MD

## 2021-01-07 NOTE — PROGRESS NOTES
[facility]ERT[facility] FU call    Patient with cholangiocarcinoma s/p pylorus-preserving Whipple 10/2017; G_J George-EN Y revision 2020, HTN, DM, BPH and chronic LBP  * IP stay  -   - intractable pain ;  Celiac plexus block/ palliative care.    -Progressive stage IV disease- L5 metastasis, growth in paraaortic mass, worsening pain and small worrisome lung nodules. Additional needs identified to be addressed with provider no  none  Discussed COVID-19 related testing which was pending at this time. Test results were pending. Patient informed of results, if available? Preadmission testing  asymptomatic        Method of communication with provider : none    Care Transition Nurse (CTN) contacted the patient by telephone to follow up after admission. Verified name and  with patient as identifiers. Addressed changes since last contact: Plans for palliative chemo and interventional radiology  Discharge needs reviewed: none None  Follow up appointment completed? yes yes Was follow up appointment scheduled within 7 days of discharge? yes   * Mr. Leonel Cardona has a positive outlook on his prognosis. - Clear about actions with treatment - and appreciates positive outlook from providers.     - Ready for chemo. * Supportive family will transport for treatments. * He is very hopeful that next Tues IR will relieve the back pain he has been struggling with. Denise Vigil family has started prayer Solomon for him. Advance Care Planning:   Does patient have an Advance Directive:  reviewed and current     CTN reviewed discharge instructions, medical action plan and red flags with patient and discussed any barriers to care and/or understanding of plan of care after discharge. Discussed appropriate site of care based on symptoms and resources available to patient including: patient has resources/  and his wife is RN. The patient agrees to contact the PCP office for questions related to their healthcare.      Patients top risk factors for readmission: medical condition and medication management immunocompromised  Interventions to address risk factors: supportive encouragement    [facility] follow up appointment(s):  Port a cath placement Mon 1/11;  1/12- IR kypho lumbar- ablation of L5 met; 1/13 Palliative ; 1/15 US- repeat celiac plexus block;  1/22 - start Chemo. Future Appointments   Date Time Provider Sloane Marley   1/12/2021  8:00 AM [facility]GIO 1 [facility] REG   1/13/2021 11:30 AM Francoise lOiveros MD [facility] BS AMB   1/22/2021  9:00 AM CHAIR 3 [facility] REG   1/22/2021  9:30 AM Juana Leon NP [facility]CM[facility] BS AMB   1/29/2021  9:00 AM CHAIR 2 [facility] REG   2/12/2021  9:00 AM CHAIR 3 [address] REG   2/19/2021 11:00 AM [place] INFUSION NURSE 1 [facility] for follow-up call in 10-14 days based on severity of symptoms and risk factors. Plan for next call: symptom management-start of chemo  CTN provided contact information for future needs. Goals Addressed                 This Visit's Progress       Patient Stated     Care Coordination for chronic LBP since surgery. (pt-stated)        Patient with cholangiocarcinoma s/p pylorus-preserving Whipple 10/2017; G_J George-EN Y revision 09/02/2020, HTN, DM, BPH and chronic LBP. * Patient will be encouraged to attend physician's apt for evaluation of causes/ sources of pain with diagnosis and treatment options. * CM will support evaluation by PCP/ any referral to PT,  ortho or rheumatology for accurate assessment of pain causes; treatment options; self management. *Assess pain level by scale 1-10;  * Encourage patient to use tylenol  and prescribed lidocaine and fentanyl patch to keep pain under control;  * Encourage patient to warm soaks / ice and compression garment for pain management. - to determine what helps.      * Use relaxation breathing techniques and distraction techniques as alternatives to medications when possible. 10/26  *  Abd - pancreatic pain reduced 90-95% - since surgery.      - No N/V since surgery. - Gained 10 pounds this month. *  Lower back pain - No scan yet- unknown cause. - Followed by Palliative for pain control.     - Lidocaine pain patch helps at night, able to sleep. -  Son-in-law is PT and has suggested some exercises- but has not helped. - Doesn't hurt when walking, moving- harder in the evenings just sitting. 11/20    LB Pain is managed- \"took a week to get the medicine right. \"   - Pending apt with Dr. Luanne López for evaluation of back pain. He is reviewing    - Mr. Nikhil Puentes identifies one specific spot on his back- no radiating pain- No imaging of this area. * Surgery for hernia repair second week in Dec.  No specific date yet. * No N/V since surgery! - Awaiting insurance approval for PET scan for comparison and tracking cholangiocarcinoma. * CM asks about DM/ HTN   - Patient reports that his blood sugar and blood pressure are, \"Right where they should be. \"   A1C 5.5 10/15    12/10-  Hernia repair for 12/11   * Pain meds with Palliative have been helpful. - Pain at that spot in his back as well as herniated site. - PET scan planned for Jan to r/o CA before referring for epidural steroid shot. 12/31 -   IP stay 12/29 - 12/31  - intractable pain ;  Celiac plexus block/ palliative care. LBP-  L5 metastasis, growth in paraaortic mass, worsening pain and small worrisome lung nodules-  RFA/ Kypho with IR and will start Dexamethasone 2 mg BID.    1/4 - home - pain is managed with Palliative Care. Apt Onc Thursday/  for PICC placement Friday. May have IR to ablate leison on L5. Hoping for immunotherapy as an option. 1/8  Port a cath placement Mon 1/11;  1/12- IR kypho lumbar- ablation of L5 met; 1/13 Palliative ; 1/15 US- repeat celiac plexus block;  1/22 - start Chemo.            Other     Attends follow-up appointments as directed. Patient with cholangiocarcinoma s/p pylorus-preserving Whipple 10/2017; G_J George-EN Y revision 09/02/2020, HTN, DM, BPH and chronic LBP  * IP stay 12/29 - 12/31  ; Celiac plexus block/ palliative care. -Progressive stage IV disease- L5 metastasis, growth in paraaortic mass, worsening pain and small worrisome lung nodules    12/31-  For discharge today. * Apt with Onc Thursday.    - hopes for genetic workup from pathology to identify future treatment options. - For Port-a-cath insertion anticipating chemotherapy. * Apt with Rad Onc for possible palliative treatment. 1/4 - doing well at home/ Apt with Onc Thurs/  PICC placement Fri. IR for L5 leison is a possibility. 1/8 - Port a cath placement Mon 1/11;  1/12- IR kypho lumbar- ablation of L5 met; 1/13 Palliative ; 1/15 US- repeat celiac plexus block;  1/22 - start Chemo. Chart Review:   [facility] Oncology 1/7        Diagnosis:      1. 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 Extrahepatic cholangiocarcinoma:  T3 N1 (1 of 12 LN +ve)  Invasion into pancreas  R0 resection     Visit Vitals  /73 (BP 1 Location: Left arm, BP Patient Position: Sitting)   Pulse 76   Temp 98.4 °F (36.9 °C) (Temporal)   Ht 5' 8\" (1.727 m)   Wt 151 lb 3.2 oz (68.6 kg)   SpO2 96%   BMI 22.99 kg/m²     Assessment:      1. 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 Treatment will in a palliative intent with a goal to extend life. I will obtain biomarker on the tissue  I proposed systemic therapy with Cis/[place]/Abraxane    2. Cancer related pain     S/P celiac plexus block - done by Dr. Hans Nath with significant improvement in the pain        3.  Bone metastasis, L4     Nuclear medicine bone scan  Ablation/Kyphoplasty -  Jeff    Plan:         > Port placement  > L4 ablation/kyphoplasty  > Labs   > Start Cis/[place]/Abraxane  > NGS - by [facility]  > Palliative care   > Nuclear medicine bone scan        Signed by: Deny Vega MD                     January 7, 2021

## 2021-01-07 NOTE — PROGRESS NOTES
PER VORB FROM DR UMANZOR COMPAZINE 5MG TAB, TAKE ONE TAB BY MOUTH Q6HPRN FOR NAUSEA WAS ORDERED. QUANTITY 90 REFILL 2  Per VORB from Dr. Samy Pineda ODT 4MG EVERY 8 HOURS AS NEEDED FOR NAUSEA/AND OR VOMITING. QUANTITY 60 REFILL 2  PER VORB from Dr. Chavo Hughes lidocaine-prilocaine (EMLA) cream apply to affected area as needed for pain dispense 30g refill 2       is putting in a port next week. Chemotherapy education packet provided to the patient and reviewed, patient wife present also. Consent was also obtained. All questions and concerns were addressed. Time spent with patient approximately 20  minutes.

## 2021-01-08 ENCOUNTER — DOCUMENTATION ONLY (OUTPATIENT)
Dept: ONCOLOGY | Age: 66
End: 2021-01-08

## 2021-01-08 ENCOUNTER — TELEPHONE (OUTPATIENT)
Dept: ONCOLOGY | Age: 66
End: 2021-01-08

## 2021-01-08 LAB — SARS-COV-2, COV2NT: NOT DETECTED

## 2021-01-08 NOTE — PERIOP NOTES
PAT instructions reviewed with patient and family; and given the opportunity to ask questions. Patient instructed to self quarantine between testing and arrival time day of surgery. Patient instructed re: check-in procedure for day of surgery.

## 2021-01-08 NOTE — PROGRESS NOTES
[facility]  [address], [address] [facility] [place], [address]  [phone]       Follow-up Note      Patient: Tom Ramirez MRN: [id number]  SSN: [id number]    YOB: 1955  Age: 60 y.o. Sex: male        Diagnosis:     1. 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 S/P Pancreatoduodenectomy on  10/06/2017  2. Adjuvant monotherapy with Capecitabine - s/p 6 cycles   (12/4/2017 - 05/2018)  3. SBRT RP node/soft tissue 04/2020    Subjective:      Tom Ramirez is a 60 y.o. male with a diagnosis of extrahepatic cholangiocarcinoma with metastatic to the lungs, bones and retroperitoneal node/soft tissue. He presented to the ED in August 2017 with obstructive jaundice. He was found to have an obstructive lesion in the dital bile duct. The CT showed marked intrahepatic biliary dilation and common bile duct dilation to the level of the mid common bile duct, which ws markedly narrowed. He underwent a stenting procedure followed by spyglass cholangiogram. The brushings revealed a diagnosis of cholangiocarcinoma. He underwent a Whipple procedure on 10/06/2017. He completed 6 cycles of adjuvant Xeloda. PET scan showed increased activity in the soft tissue along the SMA. CT guided biopsy showed local recurrence. He underwent SBRT by Dr. Esmer Jordan in April 2020. He was admitted with severe back pain. Repeat CT shows growth of tumor in the L4/L5, lung and RP node/soft tissue. He underwent a CT guided celiac plexus block which has helped the back pain immensely.        Review of Systems:    Constitutional: negative  Eyes: negative  Ears, Nose, Mouth, Throat, and Face: negative  Respiratory: negative  Cardiovascular: negative  Gastrointestinal: abdominal pain   Genitourinary:negative  Integument/Breast: negative  Hematologic/Lymphatic: negative  Musculoskeletal:negative  Neurological: negative      Past Medical History:   Diagnosis Date    Aneurysm ([address] 2008    CEREBRAL    Arrhythmia     Previous a.fib, ablation, NSR now; NO LONGER FOLLOWED BY CARDIOLOGIST.  Autoimmune disease ([address]     SJOGREN'S    Cancer ([address]     COMMON BILE DUCT, ADENOCARCINOMA    Diabetes ([address]     Family history of skin cancer     Hypertension     Sepsis ([address] 2017    STENTING TO BILE DUCT    Status post chemotherapy     Stroke [facility]) 2008    brain aneurysm - no deficits    Sun-damaged skin     Sunburn, blistering      Past Surgical History:   Procedure Laterality Date    HX CHOLECYSTECTOMY      HX GI      COLONOSCOPY, POLYPS (BENIGN)    HX GI  10/06/2017    Whipple Dr. Jaki Marion [facility]     HX GI  2020    WHIPPLE REVISION    HX HEART CATHETERIZATION  2005    Ablation     HX ORTHOPAEDIC  2000    Spinal fusion W/ HARDWARE    HX OTHER SURGICAL  11/07/2017    [place] Cath, Dr. Pema Levine  2017    PORTACATH - then removed    NEUROLOGICAL PROCEDURE UNLISTED  2005    Hetal Suraj hole washout from cerebral hemorrhage      Family History   Problem Relation Age of Onset    Cancer Father         Breast and Colon, MELANOMA    Hypertension Father     Cancer Mother         LEUKEMIA    No Known Problems Sister     Atrial Fibrillation Brother     No Known Problems Sister     No Known Problems Son     No Known Problems Son     Anesth Problems Neg Hx      Social History     Tobacco Use    Smoking status: Never Smoker    Smokeless tobacco: Never Used   Substance Use Topics    Alcohol use: Never     Frequency: Never     Comment: very rare      Prior to Admission medications    Medication Sig Start Date End Date Taking? Authorizing Provider   lidocaine-prilocaine (EMLA) topical cream Apply  to affected area as needed for Pain.  30-45 min prior to treatments 1/7/21 Yes Zora Mares MD   ondansetron (ZOFRAN ODT) 4 mg disintegrating tablet Take 1 Tab by mouth every eight (8) hours as needed for Nausea or Vomiting. 1/7/21  Yes Zora Mares MD   prochlorperazine (Compazine) 10 mg tablet Take 0.5 Tabs by mouth every six (6) hours as needed for Nausea or Vomiting for up to 60 days. 1/7/21 3/8/21 Yes Zora Mares MD   HYDROmorphone (DILAUDID) 8 mg tablet Take 1 Tab by mouth every four (4) hours as needed for Pain for up to 15 days. Max Daily Amount: 48 mg. 1/4/21 1/19/21 Yes Ceclia Seip, MD   oxyCODONE ER (OxyCONTIN) 40 mg ER tablet Take 1 Tab by mouth every eight (8) hours for 30 days. Max Daily Amount: 120 mg. 12/31/20 1/30/21 Yes Aarti Mc MD   dexAMETHasone (DECADRON) 1 mg tablet Take 2 tablets by mouth twice daily for 14 days 12/31/20  Yes Aarti Mc MD   dutasteride (AVODART) 0.5 mg capsule Take 1 Cap by mouth daily. 12/21/20  Yes Matilda Eastman DO   Creon 36,000-114,000- 180,000 unit cpDR capsule Take 4 Caps by mouth three (3) times daily (with meals). 2-3 caps each meal and 1 cap for snacks (see additional order) 12/21/20  Yes Silas Raya III,    pantoprazole (PROTONIX) 40 mg tablet Take 1 Tab by mouth daily. 12/18/20  Yes Ceclia Seip, MD   naloxone [facility]) 4 mg/actuation nasal spray Use 1 spray intranasally, then discard. Repeat with new spray every 2 min as needed for opioid overdose symptoms, alternating nostrils. 12/11/20  Yes Jennifer Brar MD   aluminum & magnesium hydroxide-simethicone (Maalox Maximum Strength) 400-400-40 mg/5 mL suspension Take 10 mL by mouth every six (6) hours as needed for Indigestion. Yes Provider, Historical   vitamin A (AQUASOL A) 10,000 unit capsule Take 1 Cap by mouth daily. 10/26/20  Yes Silas Raya III, DO   vitamin E (AQUA GEMS) 400 unit capsule Take 1 Cap by mouth daily.  10/26/20  Yes Silas Raya III, DO   empagliflozin (Jardiance) 25 mg tablet Take 1 Tab by mouth nightly. 10/20/20  Yes Silas Raya III, DO   gabapentin (NEURONTIN) 100 mg capsule Take 3 capsules by mouth twice a day. 10/20/20  Yes Silas Raya III, DO   cyanocobalamin (VITAMIN B12) 1,000 mcg/mL injection INJECT CONTENTS OF ONE VIAL INTRAMUSCULARLY EVERY OTHER WEEK  Patient taking differently: INJECT CONTENTS OF ONE VIAL INTRAMUSCULARLY EVERY OTHER WEEK (FIRST AND 15TH OF EACH MONTH) 10/20/20  Yes Silas Raya III, DO   tamsulosin (FLOMAX) 0.4 mg capsule TAKE ONE CAPSULE BY MOUTH EVERY EVENING 10/20/20  Yes Silas Raya III, DO   ramipriL (ALTACE) 5 mg capsule Take 1 Cap by mouth daily. 10/20/20  Yes Silas Raya III, DO   lidocaine (LIDODERM) 5 % 1 Patch by TransDERmal route every twenty-four (24) hours. Apply patch to the affected area for 12 hours a day and remove for 12 hours a day. Patient taking differently: 1 Patch by TransDERmal route daily as needed. Apply patch to the affected area for 12 hours a day and remove for 12 hours a day. 10/15/20  Yes Silas Raya III, DO   loperamide (IMODIUM) 2 mg capsule Take 2-4 mg by mouth as needed for Diarrhea. Give 4 mg after first loose stool. Give an additional 2 mg after each loose stool as needed up to a maximum of 8 doses (16 mg/day). Yes Provider, Historical   lipase-protease-amylase (Creon) 36,000-114,000- 180,000 unit cpDR capsule Take 1 Cap by mouth as needed (for snacks). 2-3 caps each meal and 1 cap for snacks (see additional order)   Yes Provider, Historical   finasteride (PROSCAR) 5 mg tablet TAKE 1 TABLET BY MOUTH EVERY DAY 4/13/20  Yes Provider, Historical   acetaminophen (TYLENOL) 325 mg tablet Take 2 Tabs by mouth every four (4) hours as needed for Pain or Fever (Over the counter medication).  1/2/20  Yes Veto Sorrel, NP   sildenafil citrate (VIAGRA) 50 mg tablet Take 1 Tab by mouth as needed (ED). 5/6/19  Yes Czajkowski, Silas B III, DO   alpha-D-galactosidase (BEANO PO) Take 1 Tab by mouth two (2) times a day. Yes Other, MD Flynn   cetirizine (ZYRTEC) 10 mg tablet Take 10 mg by mouth nightly. Yes Provider, Historical   traMADoL (ULTRAM) 50 mg tablet Take 50 mg by mouth every six (6) hours as needed for Pain. Provider, Historical          Allergies   Allergen Reactions    Shellfish Derived Anaphylaxis     Soft shell crabs    Morphine Nausea and Vomiting    Pcn [Penicillins] Other (comments)     Pt stated \"always been told PCN\"  Pt tolerated other cephalosporins in the past           Objective:     Visit Vitals  /73 (BP 1 Location: Left arm, BP Patient Position: Sitting)   Pulse 76   Temp 98.4 °F (36.9 °C) (Temporal)   Ht 5' 8\" (1.727 m)   Wt 151 lb 3.2 oz (68.6 kg)   SpO2 96%   BMI 22.99 kg/m²     Pain Scale: 3/10 - back        Physical Exam:     GENERAL: alert, cooperative, no distress, appears stated age  EYE: negative  LYMPHATIC: Cervical, supraclavicular, and axillary nodes normal.   THROAT & NECK: normal and no erythema or exudates noted. LUNG: clear to auscultation bilaterally  HEART: regular rate and rhythm  ABDOMEN: soft, non-tender  EXTREMITIES:  no edema  SKIN: Normal.  NEUROLOGIC: negative       Physical exam and ROS has been modified from a prior visit to make it relevant and current          CT Results (most recent): FINDINGS:     CHEST WALL: No mass or axillary lymphadenopathy without contrast.  THYROID: No nodule. MEDIASTINUM: No mass or lymphadenopathy without contrast.  ANGEL: No mass or lymphadenopathy without contrast.  THORACIC AORTA: No aneurysm. MAIN PULMONARY ARTERY: Normal in caliber. TRACHEA/BRONCHI: Patent. ESOPHAGUS: No wall thickening or dilatation. HEART: Normal in size. PLEURA: No effusion or pneumothorax. LUNGS: Multiple tiny parenchymal nodules all new since the prior study in May  2020. The largest on the right is probably in the right lower lobe medially  image 53, 5.2 mm.  Although there are a number of tiny nodules on the left, the  largest maxillary lie in the left upper lobe image 35, 2.7 mm. Faint patchy  opacity in the right upper lobe image 38 may represent infiltrate. INCIDENTALLY IMAGED UPPER ABDOMEN: Described separately in a CT of the abdomen  contrast-enhanced earlier same day contrast was not administered on this  examination, including postoperative changes in the pancreas with associated  pneumobilia, and stranding of fat in the left paracolic gutter which is stable  since May 2020. Rafi Glow BONES: No destructive bone lesion. Incidentally noted old healed rib fractures.     IMPRESSION  IMPRESSION:     1. Numerous tiny pulmonary parenchymal nodules right greater than left,  suspicious for metastatic disease. 2. Focal ill-defined opacity in the right upper lobe which may represent  infiltrate. Follow-up recommended, however. 3. Incidental findings in the upper abdomen described earlier same day. EXAM:  CT abdomen pelvis with contrast     INDICATION: Abdominal pain. History of cholangiocarcinoma.     COMPARISON: CT 9/20/2020. MRI 9/3/2020.     TECHNIQUE: Helical CT of the abdomen  and pelvis  following the uneventful  intravenous administration of nonionic contrast.  Coronal and sagittal reformats  are performed. CT dose reduction was achieved through use of a standardized  protocol tailored for this examination and automatic exposure control for dose  modulation. Adaptive statistical iterative reconstruction (ASIR) was utilized.     FINDINGS:   The visualized lung bases demonstrate several scattered bilateral peripheral 1  to 2 mm nodules. The heart size is normal. There is no pericardial or pleural  effusion.     Whipple surgical changes are again noted. There is stable pneumobilia. The  gallbladder is surgically absent. The spleen, remainder of the pancreas, and  adrenal glands are normal.      The kidneys are symmetric without hydronephrosis.  There is a stable left kidney  cyst.     Poorly defined retroperitoneal soft tissue attenuation surrounding the celiac,  superior mesenteric, and renal arteries measures 3.9 x 5.3 cm (series 3, image  33), previously 4.0 x 4.4 cm. Occlusion of the left renal vein is again seen  (series 3, image 34). Collateral vessels in the central upper abdomen are again  noted.     There are no dilated bowel loops. The appendix is normal.       There are no enlarged lymph nodes. There is no free fluid or free air. The  aorta tapers without aneurysm.     The urinary bladder is normal.  There is no pelvic mass. The prostate is not  enlarged.     There is a round sclerotic bony lesion in the L5 vertebral body measuring 1.2 cm  (series 602, image 66). Posterior L5-S1 fusion hardware is noted.     IMPRESSION  IMPRESSION:   1. Surgical changes are again seen following Whipple procedure. Overall mildly  increased perivascular recurrence in the superior retroperitoneum. Left renal  vein occlusion and upper abdominal collateral vessels are again noted.      2. Round sclerotic lesion in the L5 vertebral body suspicious for metastasis.     3. Several scattered bilateral peripheral lower lung nodules measuring 1 to 2 mm  may represent infection, inflammation, or metastasis. I personally reviewed the images. Para aortic mass. L5 vertebral body metastasis and lung nodules.        Lab Results   Component Value Date/Time    WBC 5.1 12/31/2020 04:46 AM    Hemoglobin (POC) 10.3 (L) 10/06/2017 01:14 PM    HGB 13.4 12/31/2020 04:46 AM    HCT 38.9 12/31/2020 04:46 AM    PLATELET 979 (L) 83/29/3270 04:46 AM    MCV 92.2 12/31/2020 04:46 AM       Lab Results   Component Value Date/Time    Sodium 135 (L) 12/31/2020 04:46 AM    Potassium 4.0 12/31/2020 04:46 AM    Chloride 105 12/31/2020 04:46 AM    CO2 26 12/31/2020 04:46 AM    Anion gap 4 (L) 12/31/2020 04:46 AM    Glucose 167 (H) 12/31/2020 04:46 AM    BUN 12 12/31/2020 04:46 AM    Creatinine 0.67 (L) 12/31/2020 04:46 AM    BUN/Creatinine ratio 18 12/31/2020 04:46 AM    GFR est AA >60 12/31/2020 04:46 AM    GFR est non-AA >60 12/31/2020 04:46 AM    Calcium 8.6 12/31/2020 04:46 AM    Bilirubin, total 0.7 12/31/2020 04:46 AM    Alk. phosphatase 155 (H) 12/31/2020 04:46 AM    Protein, total 6.0 (L) 12/31/2020 04:46 AM    Albumin 3.1 (L) 12/31/2020 04:46 AM    Globulin 2.9 12/31/2020 04:46 AM    A-G Ratio 1.1 12/31/2020 04:46 AM    ALT (SGPT) 42 12/31/2020 04:46 AM    AST (SGOT) 24 12/31/2020 04:46 AM           Assessment:     1. 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 Cancers respond differently to treatment. Overall prognosis depends on many factors including other conditions, cancer stage, side effects, and other unforeseen events. Goal of therapy: Palliative    Expected response to treatment:  Intermediate: Anticipate cancer shrinking or slowed growth but not remission    Treatment benefits and harms:  We discussed potential short term side effects to include:GI upset, anemia, alopecia and fatigue     Long term side effects of treatment:  neuropathy    Quality of life: Quality of life concerns have been addressed. Treatment as outlined is expected to have moderate impact on patients quality of life.        > S/P Pancreatoduodenectomy on 10/06/2017    Completed adjuvant treatment with single agent Capecitabine - s/p 6 cycles (12/4/2017 - 05/2018). Local recurrence in the para-aortic soft tissue - S/P SBRT     Recent CT shows progression of disease in the retroperitoneum, L4, lungs  The disease is unresectable. Treatment will in a palliative intent with a goal to extend life. I will obtain biomarker on the tissue  I proposed systemic therapy with Cis/[place]/Abraxane    The duration of this treatment plan will be until progression, intolerable side effects, or patient choice.  Patient will be meeting with navigation services to discuss any financial barriers to care/estimated cost of care. The patient's emotional well being was addressed during this office visit and patient seems to be coping well with the diagnosis and the treatment. Common Side Effects of Chemotherapy  Decreased Blood Counts Your blood counts can decrease temporarily due to chemotherapy, they will recover over time. This is an expected side effect that your Doctor will be monitoring.  - If you experience fevers (temperature >100.4°F), bleeding or unexplained bruising, please call the office right away   Risk of Infection Your white blood cells can decrease temporarily due to chemotherapy and can put you at higher risk of infection. Washing hands frequently with soap and avoiding sick contacts can reduce your risk of infection.  - If you experience fevers (temperature >100.4°F), shaking chills, or any signs of infection, please call the office immediately   Anemia Chemotherapy can cause your red blood cells to temporarily decrease; this is an expected side effect that your Doctor will be monitoring.  - You may experience fatigue if this occurs, please notify the office if you experience bleeding, shortness of breath with minimal exertion or at rest, rapid heartbeat, or feeling as though you may lose consciousness. Hair Loss Chemotherapy can affect your hair follicles and cause you to lose hair. This can occur on your scalp hair but also all over your body including eyebrows and eye lashes   Nausea  You have been prescribed nausea medication to take if needed. Please follow the directions given to you by your Doctor. - Please call the office if the medications you have been given are not relieving nausea. Vomiting Make sure you are taking anti-nausea medication as prescribed. Eating small amounts of bland foods frequently can help.   - Please call the office right away if you are vomiting more than 4 times per day or are unable to keep down food or fluids   Diarrhea Eating small amounts of bland foods frequently can help, increase your fluid intake. It is usually ok to take Imodium for diarrhea. - Please call the office right away if you experience more than 4 episodes of watery diarrhea or if you are feeling dehydrated. You can reach Medical Oncology at [facility] with further questions or concerns at: [phone].    - Calls during normal business hours will reach our office.  - Calls after hours or on the weekend will reach an answering service and the on-call Oncologist will return your call. 2. Cancer related pain    S/P celiac plexus block - done by Dr. Liseth Dominique with significant improvement in the pain      3. Bone metastasis, L4    Nuclear medicine bone scan  Ablation/Kyphoplasty - Dr. David Devine:       > Port placement  > L4 ablation/kyphoplasty  > Labs   > Start Cis/Daniels/Abraxane  > NGS - by [facility]  > Palliative care   > Nuclear medicine bone scan      Signed by: Joana Dandy, MD                     January 7, 2021      CC. Ander Arellano MD  CC. Shellie Grewal MD  CC. Noralee Merlin, MD  CC. Landon Mcbride MD  CC.  Soy Walters MD

## 2021-01-08 NOTE — TELEPHONE ENCOUNTER
Patient called and left message on ED [facility] nurse voicemail stating he is due to start chemotherapy soon, but wants to know if he should get a COVID vaccine.   Please return call

## 2021-01-10 ENCOUNTER — ANESTHESIA EVENT (OUTPATIENT)
Dept: SURGERY | Age: 66
End: 2021-01-10
Payer: COMMERCIAL

## 2021-01-11 ENCOUNTER — HOSPITAL ENCOUNTER (OUTPATIENT)
Age: 66
Setting detail: OUTPATIENT SURGERY
Discharge: HOME | End: 2021-01-11
Attending: SURGERY | Admitting: SURGERY
Payer: COMMERCIAL

## 2021-01-11 ENCOUNTER — ANESTHESIA (OUTPATIENT)
Dept: SURGERY | Age: 66
End: 2021-01-11
Payer: COMMERCIAL

## 2021-01-11 ENCOUNTER — APPOINTMENT (OUTPATIENT)
Dept: GENERAL RADIOLOGY | Age: 66
End: 2021-01-11
Attending: SURGERY
Payer: COMMERCIAL

## 2021-01-11 VITALS
SYSTOLIC BLOOD PRESSURE: 149 MMHG | DIASTOLIC BLOOD PRESSURE: 91 MMHG | BODY MASS INDEX: 22.73 KG/M2 | TEMPERATURE: 97.6 F | WEIGHT: 150 LBS | OXYGEN SATURATION: 100 % | RESPIRATION RATE: 13 BRPM | HEART RATE: 94 BPM | HEIGHT: 68 IN

## 2021-01-11 LAB
GLUCOSE BLD STRIP.AUTO-MCNC: 120 MG/DL (ref 65–100)
SERVICE CMNT-IMP: ABNORMAL

## 2021-01-11 PROCEDURE — 76937 US GUIDE VASCULAR ACCESS: CPT | Performed by: SURGERY

## 2021-01-11 PROCEDURE — 2709999900 HC NON-CHARGEABLE SUPPLY: Performed by: SURGERY

## 2021-01-11 PROCEDURE — 76060000032 HC ANESTHESIA 0.5 TO 1 HR: Performed by: SURGERY

## 2021-01-11 PROCEDURE — 77030010507 HC ADH SKN DERMBND J&J -B: Performed by: SURGERY

## 2021-01-11 PROCEDURE — 74011250636 HC RX REV CODE- 250/636: Performed by: ANESTHESIOLOGY

## 2021-01-11 PROCEDURE — 36561 INSERT TUNNELED CV CATH: CPT | Performed by: SURGERY

## 2021-01-11 PROCEDURE — 74011000250 HC RX REV CODE- 250: Performed by: NURSE ANESTHETIST, CERTIFIED REGISTERED

## 2021-01-11 PROCEDURE — 76010000138 HC OR TIME 0.5 TO 1 HR: Performed by: SURGERY

## 2021-01-11 PROCEDURE — 74011250636 HC RX REV CODE- 250/636: Performed by: NURSE ANESTHETIST, CERTIFIED REGISTERED

## 2021-01-11 PROCEDURE — 74011250637 HC RX REV CODE- 250/637: Performed by: ANESTHESIOLOGY

## 2021-01-11 PROCEDURE — 71045 X-RAY EXAM CHEST 1 VIEW: CPT

## 2021-01-11 PROCEDURE — 76210000000 HC OR PH I REC 2 TO 2.5 HR: Performed by: SURGERY

## 2021-01-11 PROCEDURE — 77030002933 HC SUT MCRYL J&J -A: Performed by: SURGERY

## 2021-01-11 PROCEDURE — C1788 PORT, INDWELLING, IMP: HCPCS | Performed by: SURGERY

## 2021-01-11 PROCEDURE — 77030040361 HC SLV COMPR DVT MDII -B: Performed by: SURGERY

## 2021-01-11 PROCEDURE — 77030031139 HC SUT VCRL2 J&J -A: Performed by: SURGERY

## 2021-01-11 PROCEDURE — 82962 GLUCOSE BLOOD TEST: CPT

## 2021-01-11 PROCEDURE — 77001 FLUOROGUIDE FOR VEIN DEVICE: CPT | Performed by: SURGERY

## 2021-01-11 PROCEDURE — 74011250636 HC RX REV CODE- 250/636: Performed by: SURGERY

## 2021-01-11 PROCEDURE — 77030040922 HC BLNKT HYPOTHRM STRY -A

## 2021-01-11 PROCEDURE — 74011000258 HC RX REV CODE- 258: Performed by: NURSE ANESTHETIST, CERTIFIED REGISTERED

## 2021-01-11 PROCEDURE — 74011000250 HC RX REV CODE- 250: Performed by: SURGERY

## 2021-01-11 DEVICE — POWERPORT IMPLANTABLE PORT WITH ATTACHABLE 8F CHRONOFLEX OPEN-ENDED SINGLE-LUMEN VENOUS CATHETER INTERMEDIATE KIT (WITH SUTURE PLUGS)
Type: IMPLANTABLE DEVICE | Site: CHEST | Status: FUNCTIONAL
Brand: POWERPORT, CHRONOFLEX

## 2021-01-11 RX ORDER — FENTANYL CITRATE 50 UG/ML
INJECTION, SOLUTION INTRAMUSCULAR; INTRAVENOUS AS NEEDED
Status: DISCONTINUED | OUTPATIENT
Start: 2021-01-11 | End: 2021-01-11 | Stop reason: HOSPADM

## 2021-01-11 RX ORDER — MIDAZOLAM HYDROCHLORIDE 1 MG/ML
1 INJECTION, SOLUTION INTRAMUSCULAR; INTRAVENOUS AS NEEDED
Status: DISCONTINUED | OUTPATIENT
Start: 2021-01-11 | End: 2021-01-11 | Stop reason: HOSPADM

## 2021-01-11 RX ORDER — LIDOCAINE HYDROCHLORIDE 10 MG/ML
0.1 INJECTION, SOLUTION EPIDURAL; INFILTRATION; INTRACAUDAL; PERINEURAL AS NEEDED
Status: DISCONTINUED | OUTPATIENT
Start: 2021-01-11 | End: 2021-01-11 | Stop reason: HOSPADM

## 2021-01-11 RX ORDER — SODIUM CHLORIDE 0.9 % (FLUSH) 0.9 %
5-40 SYRINGE (ML) INJECTION EVERY 8 HOURS
Status: DISCONTINUED | OUTPATIENT
Start: 2021-01-11 | End: 2021-01-11 | Stop reason: HOSPADM

## 2021-01-11 RX ORDER — SODIUM CHLORIDE, SODIUM LACTATE, POTASSIUM CHLORIDE, CALCIUM CHLORIDE 600; 310; 30; 20 MG/100ML; MG/100ML; MG/100ML; MG/100ML
INJECTION, SOLUTION INTRAVENOUS
Status: DISCONTINUED | OUTPATIENT
Start: 2021-01-11 | End: 2021-01-11 | Stop reason: HOSPADM

## 2021-01-11 RX ORDER — PANTOPRAZOLE SODIUM 40 MG/1
TABLET, DELAYED RELEASE ORAL
Qty: 30 TAB | Refills: 0 | Status: SHIPPED | OUTPATIENT
Start: 2021-01-11 | End: 2022-01-01

## 2021-01-11 RX ORDER — PROPOFOL 10 MG/ML
INJECTION, EMULSION INTRAVENOUS
Status: DISCONTINUED | OUTPATIENT
Start: 2021-01-11 | End: 2021-01-11 | Stop reason: HOSPADM

## 2021-01-11 RX ORDER — MIDAZOLAM HYDROCHLORIDE 1 MG/ML
INJECTION, SOLUTION INTRAMUSCULAR; INTRAVENOUS AS NEEDED
Status: DISCONTINUED | OUTPATIENT
Start: 2021-01-11 | End: 2021-01-11 | Stop reason: HOSPADM

## 2021-01-11 RX ORDER — BUPIVACAINE HYDROCHLORIDE AND EPINEPHRINE 2.5; 5 MG/ML; UG/ML
INJECTION, SOLUTION EPIDURAL; INFILTRATION; INTRACAUDAL; PERINEURAL AS NEEDED
Status: DISCONTINUED | OUTPATIENT
Start: 2021-01-11 | End: 2021-01-11 | Stop reason: HOSPADM

## 2021-01-11 RX ORDER — FENTANYL CITRATE 50 UG/ML
50 INJECTION, SOLUTION INTRAMUSCULAR; INTRAVENOUS AS NEEDED
Status: DISCONTINUED | OUTPATIENT
Start: 2021-01-11 | End: 2021-01-11 | Stop reason: HOSPADM

## 2021-01-11 RX ORDER — SODIUM CHLORIDE 0.9 % (FLUSH) 0.9 %
5-40 SYRINGE (ML) INJECTION AS NEEDED
Status: DISCONTINUED | OUTPATIENT
Start: 2021-01-11 | End: 2021-01-11 | Stop reason: HOSPADM

## 2021-01-11 RX ORDER — ACETAMINOPHEN 325 MG/1
650 TABLET ORAL ONCE
Status: COMPLETED | OUTPATIENT
Start: 2021-01-11 | End: 2021-01-11

## 2021-01-11 RX ORDER — HYDROMORPHONE HYDROCHLORIDE 1 MG/ML
0.2 INJECTION, SOLUTION INTRAMUSCULAR; INTRAVENOUS; SUBCUTANEOUS
Status: DISCONTINUED | OUTPATIENT
Start: 2021-01-11 | End: 2021-01-11 | Stop reason: HOSPADM

## 2021-01-11 RX ORDER — FENTANYL CITRATE 50 UG/ML
25 INJECTION, SOLUTION INTRAMUSCULAR; INTRAVENOUS
Status: DISCONTINUED | OUTPATIENT
Start: 2021-01-11 | End: 2021-01-11 | Stop reason: HOSPADM

## 2021-01-11 RX ORDER — HEPARIN 100 UNIT/ML
SYRINGE INTRAVENOUS AS NEEDED
Status: DISCONTINUED | OUTPATIENT
Start: 2021-01-11 | End: 2021-01-11 | Stop reason: HOSPADM

## 2021-01-11 RX ORDER — ONDANSETRON 2 MG/ML
4 INJECTION INTRAMUSCULAR; INTRAVENOUS AS NEEDED
Status: DISCONTINUED | OUTPATIENT
Start: 2021-01-11 | End: 2021-01-11 | Stop reason: HOSPADM

## 2021-01-11 RX ORDER — PROPOFOL 10 MG/ML
INJECTION, EMULSION INTRAVENOUS AS NEEDED
Status: DISCONTINUED | OUTPATIENT
Start: 2021-01-11 | End: 2021-01-11 | Stop reason: HOSPADM

## 2021-01-11 RX ORDER — SODIUM CHLORIDE, SODIUM LACTATE, POTASSIUM CHLORIDE, CALCIUM CHLORIDE 600; 310; 30; 20 MG/100ML; MG/100ML; MG/100ML; MG/100ML
50 INJECTION, SOLUTION INTRAVENOUS CONTINUOUS
Status: DISCONTINUED | OUTPATIENT
Start: 2021-01-11 | End: 2021-01-11 | Stop reason: HOSPADM

## 2021-01-11 RX ORDER — LIDOCAINE HYDROCHLORIDE 20 MG/ML
INJECTION, SOLUTION EPIDURAL; INFILTRATION; INTRACAUDAL; PERINEURAL AS NEEDED
Status: DISCONTINUED | OUTPATIENT
Start: 2021-01-11 | End: 2021-01-11 | Stop reason: HOSPADM

## 2021-01-11 RX ADMIN — SODIUM CHLORIDE, POTASSIUM CHLORIDE, SODIUM LACTATE AND CALCIUM CHLORIDE: 600; 310; 30; 20 INJECTION, SOLUTION INTRAVENOUS at 09:54

## 2021-01-11 RX ADMIN — FENTANYL CITRATE 25 MCG: 50 INJECTION, SOLUTION INTRAMUSCULAR; INTRAVENOUS at 10:13

## 2021-01-11 RX ADMIN — FENTANYL CITRATE 25 MCG: 50 INJECTION, SOLUTION INTRAMUSCULAR; INTRAVENOUS at 10:04

## 2021-01-11 RX ADMIN — DEXMEDETOMIDINE HYDROCHLORIDE 5 MCG: 100 INJECTION, SOLUTION, CONCENTRATE INTRAVENOUS at 10:12

## 2021-01-11 RX ADMIN — CEFAZOLIN SODIUM 2 G: 10 INJECTION, POWDER, FOR SOLUTION INTRAVENOUS at 10:04

## 2021-01-11 RX ADMIN — MIDAZOLAM 2 MG: 1 INJECTION INTRAMUSCULAR; INTRAVENOUS at 09:55

## 2021-01-11 RX ADMIN — ACETAMINOPHEN 650 MG: 325 TABLET ORAL at 09:03

## 2021-01-11 RX ADMIN — FENTANYL CITRATE 25 MCG: 50 INJECTION, SOLUTION INTRAMUSCULAR; INTRAVENOUS at 10:59

## 2021-01-11 RX ADMIN — DEXMEDETOMIDINE HYDROCHLORIDE 5 MCG: 100 INJECTION, SOLUTION, CONCENTRATE INTRAVENOUS at 10:21

## 2021-01-11 RX ADMIN — FENTANYL CITRATE 50 MCG: 50 INJECTION, SOLUTION INTRAMUSCULAR; INTRAVENOUS at 10:33

## 2021-01-11 RX ADMIN — PROPOFOL 75 MCG/KG/MIN: 10 INJECTION, EMULSION INTRAVENOUS at 09:57

## 2021-01-11 RX ADMIN — FENTANYL CITRATE 25 MCG: 50 INJECTION, SOLUTION INTRAMUSCULAR; INTRAVENOUS at 11:16

## 2021-01-11 RX ADMIN — PROPOFOL 30 MG: 10 INJECTION, EMULSION INTRAVENOUS at 09:55

## 2021-01-11 RX ADMIN — PROPOFOL 50 MG: 10 INJECTION, EMULSION INTRAVENOUS at 10:02

## 2021-01-11 RX ADMIN — DEXMEDETOMIDINE HYDROCHLORIDE 5 MCG: 100 INJECTION, SOLUTION, CONCENTRATE INTRAVENOUS at 10:02

## 2021-01-11 RX ADMIN — LIDOCAINE HYDROCHLORIDE 40 MG: 20 INJECTION, SOLUTION EPIDURAL; INFILTRATION; INTRACAUDAL; PERINEURAL at 10:02

## 2021-01-11 RX ADMIN — SODIUM CHLORIDE, POTASSIUM CHLORIDE, SODIUM LACTATE AND CALCIUM CHLORIDE 50 ML/HR: 600; 310; 30; 20 INJECTION, SOLUTION INTRAVENOUS at 09:00

## 2021-01-11 NOTE — ANESTHESIA POSTPROCEDURE EVALUATION
Post-Anesthesia Evaluation and Assessment    Patient: Halle Carbone MRN: [id number]  SSN: [id number]    YOB: 1955  Age: 60 y.o. Sex: male      I have evaluated the patient and they are stable and ready for discharge from the PACU. Cardiovascular Function/Vital Signs  Visit Vitals  /82   Pulse 72   Temp 36.4 °C (97.6 °F)   Resp 18   Ht 5' 8\" (1.727 m)   Wt 68 kg (150 lb)   SpO2 98%   BMI 22.81 kg/m²       Patient is status post MAC anesthesia for Procedure(s):  PORT A CATH PLACEMENT WITH FLUOROSCOPY AND ULTRASOUND GUIDANCE (EIP TO FOLLOW). Nausea/Vomiting: None    Postoperative hydration reviewed and adequate. Pain:  Pain Scale 1: Numeric (0 - 10) (01/11/21 1139)  Pain Intensity 1: 5 (01/11/21 1139)   Managed    Neurological Status:   Neuro (WDL): Within Defined Limits (01/11/21 1139)   At baseline    Mental Status, Level of Consciousness: Alert and  oriented to person, place, and time    Pulmonary Status:   O2 Device: Room air (01/11/21 1139)   Adequate oxygenation and airway patent    Complications related to anesthesia: None    Post-anesthesia assessment completed.  No concerns    Signed By: Devon Bradshaw MD     January 11, 2021

## 2021-01-11 NOTE — ROUTINE PROCESS
Patient: Jessika Devlin. MRN: [id number]  SSN: [id number] YOB: 1955  Age: 60 y.o. Sex: male Patient is status post Procedure(s): PORT A CATH PLACEMENT WITH FLUOROSCOPY AND ULTRASOUND GUIDANCE (EIP TO FOLLOW). Surgeon(s) and Role: Nevaeh Mo MD - Primary Local/Dose/Irrigation:  SEE MAR Peripheral IV 01/11/21 Right Antecubital (Active) Site Assessment Clean, dry, & intact 01/11/21 5502 Dressing Status Clean, dry, & intact 01/11/21 2607 Dressing Type Transparent 01/11/21 0855 Hub Color/Line Status Infusing 01/11/21 0855 Dressing/Packing:  Incision 01/11/21 Chest Right-Dressing/Treatment: Skin glue (01/11/21 1039) Splint/Cast:  ] Other:

## 2021-01-11 NOTE — OP NOTES
OPERATIVE NOTE    Date of Procedure: 1/11/2021     Preoperative Diagnosis: METASTATIC CHOLANGIOCARCINOMA    Postoperative Diagnosis: METASTATIC CHOLANGIOCARCINOMA      Procedure:   RIGHT IJ PORT-A-CATH PLACEMENT WITH FLUOROSCOPY AND ULTRASOUND GUIDANCE    Surgeon: Karry Gaucher, MD     Assistant(s): Deven Peng     Anesthesia: MAC     Estimated Blood Loss: 2 mL    Specimens: * No specimens in log *     Findings: Right IJ vein accessed with ultrasound guidance. Catheter positioned in SVC with fluoroscopy. Port flushed and withdrew easily at the completion of the case. Indication: The patient has a history of cholangiocarcinoma who now has been noted to have metastatic recurrence. He presents for port placement for chemotherapy. Description of the Procedure: A complete discussion of risks, benefits and alternatives to surgery were had with the patient, after which informed consent was obtained. The patient was in agreement to proceed. The patient was brought back to the operating room and placed under MAC anesthesia. He was in the supine position on the operating room table with the right arm tucked. The patient was then prepped and draped in the usual sterile fashion and a proper timeout was performed. The patient was then placed in Trendelenburg position. A sterile ultrasound was used to identify the right IJ vein. This was compressible and adjacent to the carotid artery. Local anesthetic was injected into the skin and subcutaneous tissues with ultrasound guidance adjacent to the IJ vein. A small stab incision was then made in the base of the neck with an 11 blade. A needle was then passed into the IJ vein using ultrasound guidance and dark venous blood was aspirated. A wire was passed into the vein and the position in the SVC was confirmed on fluoroscopy. The needle was removed. We then created our port pocket 1 fingerbreadth below the clavicle near the delto-pectoral groove.   A 2.5 cm incision was made after injection of local anesthetic and we dissected down to the fascia with cautery. A pocket was made anterior to the fascia large enough to house the port and 3 2-0 prolene sutures were placed to anchor the port. The catheter was flushed and then tunneled through the subcutaneous tissues over the clavicle and out through the stab incision in the neck. The dilator and introducer sheath were then passed over the wire into the SVC. Fluoro was used to confirm the position. The catheter was then advanced into the introducer sheath which was then torn away and removed. The catheter was pulled back into appropriate position in the SVC under fluoroscopy. This was then connected to the port and secured with the provided plastic connector. The port was secured in the pocket with the 2-0 prolene sutures. A final fluoroscopy image was obtained to confirm position of the catheter. The port was flushed using heparinized saline followed by 2.5 mL of the heparin lock solution. The port flushed and withdrew easily. The incision was then closed in layers using a 3-0 vicryl for interrupted sutures to close the deep dermis followed by a running 4-0 monocryl subcuticular stitch. The neck incision was closed using an interrupted 4-0 monocryl. Both incisions were covered with dermabond skin adhesive. The patient was then awakened and taken to the PACU in stable condition. All needle and instrument counts were correct at the completion of the case. I was present and scrubbed through the entirety of the case. There were no immediate complications. A chest xray was ordered to confirm position of the catheter in the PACU. Complications: None  Implants:   Implant Name Type Inv.  Item Serial No.  Lot No. LRB No. Used Action   PORT ATTACH CATH POWERPORT 8FR --  - SNA  PORT ATTACH CATH POWERPORT 8FR --  NA [employer] [id number] 1 Implanted       Dianne Shaikh MD [facility]  1/11/2021

## 2021-01-11 NOTE — DISCHARGE INSTRUCTIONS
Implanted Port: What to Expect at [address] have had a procedure to implant a port. A port is a device placed, in most cases, under the skin of your chest below your collarbone. It is made of plastic, stainless steel, or titanium. It's about the size of a quarter, but thicker. It looks like a small bump under your skin. A thin, flexible tube called a catheter runs under the skin from the port into a large vein. With the port, you will be able to get medicines (such as chemotherapy) with more comfort. You also can get blood, nutrients, or other fluids. Blood can be taken through the port for tests. You will probably have some discomfort and bruising at the port site. This will go away in a few days. The port can be used right away. You may have the port for weeks, months, or longer. Your port will need to be flushed out regularly to keep it open. A nurse or other health professional will do this for you. This care sheet gives you a general idea about how long it will take for you to recover. But each person recovers at a different pace. Follow the steps below to feel better as quickly as possible. How can you care for yourself at home? Activity  ? · Avoid arm and upper body movements that may pull on the catheter. These movements include heavy weight lifting and vigorous use of your arms. ? · You will probably need to take 1 day off from work and will be able to return to normal activities shortly after. This depends on the type of work you do, why you have the catheter, and how you feel. ? · You probably will be able to take baths and swim. But you may need to avoid some activities. Talk to your doctor about any limits on your activity. ? · Ask your doctor when you can drive again. Be careful when you pull your seat belt across your chest so it doesn't pull out the catheter. It's okay if the seat belt lays over the catheter. Medicines  ?  · Your doctor will tell you if and when you can restart your medicines. He or she will also give you instructions about taking any new medicines. ? · If you take blood thinners, such as warfarin (Coumadin), clopidogrel (Plavix), or aspirin, be sure to talk to your doctor. He or she will tell you if and when to start taking those medicines again. Make sure that you understand exactly what your doctor wants you to do. ? · Be safe with medicines. Read and follow all instructions on the label. ¨ If the doctor gave you a prescription medicine for pain, take it as prescribed. ¨ If you are not taking a prescription pain medicine, ask your doctor if you can take an over-the-counter medicine. Incision care  ? · If you have a bandage, your doctor will tell you when you can remove it. After you remove the bandage, you may shower. Wash the area with soap and water and pat it dry. Don't use hydrogen peroxide or alcohol, which can slow healing. You may cover the area with a gauze bandage if it weeps or rubs against clothing. Change the bandage every day. ? · If you have strips of tape on the cut (incision) the doctor made, leave the tape on for a week or until it falls off. Other instructions  ? · Always carry the medical alert card that your doctor gives you. It contains information about your port. It will tell health care workers that you have a port in case you need emergency care. ? · When you get dressed, be careful not to rub the port. Do not wear a bra or suspenders that irritate your skin near the port. ? · Do not have your blood pressure taken on the arm with the port. Follow-up care is a key part of your treatment and safety. Be sure to make and go to all appointments, and call your doctor if you are having problems. It's also a good idea to know your test results and keep a list of the medicines you take. When should you call for help?   Call your doctor now or seek immediate medical care if:  ? · You have signs of infection, such as:  ¨ Increased pain, swelling, warmth, or redness. ¨ Red streaks leading from the area. ¨ Pus draining from the area. ¨ A fever. ? · You have pain or swelling in your neck or arm. ? · You have trouble breathing. ? Watch closely for changes in your health, and be sure to contact your doctor if:  ? · You have any problems with your line or port. Where can you learn more? Go to http://www.gray.com/. Enter M256 in the search box to learn more about \"Implanted Port: What to Expect at Home. \"  Current as of: 2017  Content Version: 11.4  © 4077-4406 [employer]. Care instructions adapted under license by [employer] (which disclaims liability or warranty for this information). If you have questions about a medical condition or this instruction, always ask your healthcare professional. [address] any warranty or liability for your use of this information.         Patient Discharge Instructions    Fran Sylvester / [id number] : 1955    Admitted 2021 Discharged: 2021       · It is important that you take the medication exactly as they are prescribed. · Keep your medication in the bottles provided by the pharmacist and keep a list of the medication names, dosages, and times to be taken in your wallet. · Do not take other medications without consulting your doctor. What to do at Home    Recommended diet: Resume previous diet    Recommended activity: No strenuous activity for 2-3 days until soreness resolves. No Driving While Taking narcotic pain medications. May Take Shower when you go home. Do not submerge your incision under water for at least 7 days. If you experience any of the following symptoms Fevers, Chills, Nausea, Vomitting, Redness or Drainage at Surgical Site(s) or Any Other Questions or Concerns Please Call -  [phone]. Follow-up with Dr. Christopher Rollins in ~ 14 days.         Information obtained by :  I understand that if any problems occur once I am at home I am to contact my physician. I understand and acknowledge receipt of the instructions indicated above.                                                                                                                                            Physician's or R.N.'s Signature                                                                  Date/Time                                                                                                                                              Patient or Representative Signature                                                          Date/Time

## 2021-01-11 NOTE — H&P
Date of Surgery Update:  Armando Reyes. was seen and examined. History and physical has been reviewed. The patient has been examined. There have been no significant clinical changes since the completion of the originally dated History and Physical.  Patient with recurrent cholangiocarcinoma. He is referred for port-a-cath placement for chemotherapy. A complete discussion of the risks, benefits and alternatives to surgery were discussed with the patient who was keen to proceed. The patient was counseled at length about the risks of sofía Covid-19 during their perioperative period and any recovery window from their procedure. The patient was made aware that sofía Covid-19  may worsen their prognosis for recovering from their procedure and lend to a higher morbidity and/or mortality risk. All material risks, benefits, and reasonable alternatives including postponing the procedure were discussed. The patient does  wish to proceed with the procedure at this time. Signed By: Carmen Altamirano MD     January 11, 2021 9:23 AM         Please note from the office and include the additional information below:    Past Medical History  Past Medical History:   Diagnosis Date    Aneurysm ([address] 2008    CEREBRAL    Arrhythmia     Previous a.fib, ablation, NSR now; NO LONGER FOLLOWED BY CARDIOLOGIST.     Autoimmune disease ([address]     SJOGREN'S    Cancer ([address] 2020    COMMON BILE DUCT, ADENOCARCINOMA, SPINE    Diabetes (HCC)     NIDDM    Family history of skin cancer     Hypertension     Sepsis ([address] 2017    STENTING TO BILE DUCT    Status post chemotherapy     Stroke [facility]) 2008    brain aneurysm - no deficits    Sun-damaged skin     Sunburn, blistering         Past Surgical History  Past Surgical History:   Procedure Laterality Date    HX CHOLECYSTECTOMY      HX GI      COLONOSCOPY, POLYPS (BENIGN)    HX GI  10/06/2017    Viktor Puga [address] [place] [address]     HX GI  2020    WHIPPSHAZIA REVISION    HX HEART CATHETERIZATION  2005    Ablation     HX HERNIA REPAIR  12/2020    HERNIA REPAIR    HX ORTHOPAEDIC  2000    Spinal fusion W/ HARDWARE    HX OTHER SURGICAL  11/07/2017    [place] Dr. Kahlil Jamison  2017    PORTACATH - then removed    NEUROLOGICAL PROCEDURE UNLISTED  2005    Ting hole washout from cerebral hemorrhage    MD ABDOMEN SURGERY PROC UNLISTED  10/2017    [place]        Social History  The patient Billie Ling.  reports that he has never smoked. He has never used smokeless tobacco. He reports that he does not drink alcohol or use drugs.      Family History  Family History   Problem Relation Age of Onset    Cancer Father         Breast and Colon, MELANOMA    Hypertension Father     Cancer Mother         LEUKEMIA    No Known Problems Sister     Atrial Fibrillation Brother     No Known Problems Sister     No Known Problems Son     No Known Problems Son    [facility] Anesth Problems Neg Hx           Vanessa Pepper MD

## 2021-01-11 NOTE — ANESTHESIA PREPROCEDURE EVALUATION
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)   (+) Cholangiocarcinoma of biliary tract (HCC)       Anesthetic History     PONV          Review of Systems / Medical History  Patient summary reviewed, nursing notes reviewed and pertinent labs reviewed    Pulmonary          Undiagnosed apnea         Neuro/Psych       CVA  TIA     Cardiovascular    Hypertension        Dysrhythmias       Exercise tolerance: >4 METS     GI/Hepatic/Renal               Comments: whippple Endo/Other    Diabetes    Cancer     Other Findings   Comments: Extrahepatic cholangiocarcinoma            Physical Exam    Airway  Mallampati: I  TM Distance: 4 - 6 cm  Neck ROM: normal range of motion   Mouth opening: Normal     Cardiovascular    Rhythm: regular           Dental  No notable dental hx       Pulmonary  Breath sounds clear to auscultation               Abdominal         Other Findings            Anesthetic Plan    ASA: 3  Anesthesia type: MAC          Induction: Intravenous  Anesthetic plan and risks discussed with: Patient

## 2021-01-12 ENCOUNTER — PATIENT OUTREACH (OUTPATIENT)
Dept: OTHER | Age: 66
End: 2021-01-12

## 2021-01-12 ENCOUNTER — HOSPITAL ENCOUNTER (OUTPATIENT)
Dept: INTERVENTIONAL RADIOLOGY/VASCULAR | Age: 66
Discharge: HOME | End: 2021-01-12
Attending: INTERNAL MEDICINE
Payer: COMMERCIAL

## 2021-01-12 VITALS
OXYGEN SATURATION: 97 % | HEIGHT: 68 IN | HEART RATE: 71 BPM | SYSTOLIC BLOOD PRESSURE: 121 MMHG | RESPIRATION RATE: 20 BRPM | TEMPERATURE: 98.1 F | WEIGHT: 145 LBS | BODY MASS INDEX: 21.98 KG/M2 | DIASTOLIC BLOOD PRESSURE: 74 MMHG

## 2021-01-12 DIAGNOSIS — C22.1 MALIGNANT NEOPLASM OF INTRAHEPATIC BILE DUCTS (HCC): ICD-10-CM

## 2021-01-12 DIAGNOSIS — R93.7 MUSCULOSKELETAL SYSTEM IMAGING ABNORMALITY: ICD-10-CM

## 2021-01-12 PROCEDURE — C1713 ANCHOR/SCREW BN/BN,TIS/BN: HCPCS

## 2021-01-12 PROCEDURE — 88311 DECALCIFY TISSUE: CPT

## 2021-01-12 PROCEDURE — C1886 CATHETER, ABLATION: HCPCS

## 2021-01-12 PROCEDURE — 88307 TISSUE EXAM BY PATHOLOGIST: CPT

## 2021-01-12 PROCEDURE — 99152 MOD SED SAME PHYS/QHP 5/>YRS: CPT

## 2021-01-12 PROCEDURE — 77030003666 HC NDL SPINAL BD -A

## 2021-01-12 PROCEDURE — 77030037492 HC KT BN ACCS OSTEOCOOL MEDT -E

## 2021-01-12 PROCEDURE — 2709999900 HC NON-CHARGEABLE SUPPLY

## 2021-01-12 PROCEDURE — 77030011218 HC DEV BIOP BN KYPH -C

## 2021-01-12 PROCEDURE — 74011000250 HC RX REV CODE- 250: Performed by: STUDENT IN AN ORGANIZED HEALTH CARE EDUCATION/TRAINING PROGRAM

## 2021-01-12 PROCEDURE — 74011250636 HC RX REV CODE- 250/636: Performed by: STUDENT IN AN ORGANIZED HEALTH CARE EDUCATION/TRAINING PROGRAM

## 2021-01-12 PROCEDURE — 77030040175 HC TAMP SPN BN INFLT KYPH MEDT -I1

## 2021-01-12 RX ORDER — HEPARIN SODIUM 200 [USP'U]/100ML
400 INJECTION, SOLUTION INTRAVENOUS ONCE
Status: DISPENSED | OUTPATIENT
Start: 2021-01-12 | End: 2021-01-12

## 2021-01-12 RX ORDER — MIDAZOLAM HYDROCHLORIDE 1 MG/ML
0-10 INJECTION, SOLUTION INTRAMUSCULAR; INTRAVENOUS
Status: DISCONTINUED | OUTPATIENT
Start: 2021-01-12 | End: 2021-01-12

## 2021-01-12 RX ORDER — LIDOCAINE HYDROCHLORIDE 20 MG/ML
20 INJECTION, SOLUTION INFILTRATION; PERINEURAL ONCE
Status: COMPLETED | OUTPATIENT
Start: 2021-01-12 | End: 2021-01-12

## 2021-01-12 RX ORDER — SODIUM CHLORIDE 9 MG/ML
25 INJECTION, SOLUTION INTRAVENOUS CONTINUOUS
Status: DISCONTINUED | OUTPATIENT
Start: 2021-01-12 | End: 2021-01-12

## 2021-01-12 RX ORDER — BUPIVACAINE HYDROCHLORIDE 5 MG/ML
20 INJECTION, SOLUTION EPIDURAL; INTRACAUDAL
Status: COMPLETED | OUTPATIENT
Start: 2021-01-12 | End: 2021-01-12

## 2021-01-12 RX ORDER — CLINDAMYCIN PHOSPHATE 900 MG/50ML
900 INJECTION INTRAVENOUS ONCE
Status: COMPLETED | OUTPATIENT
Start: 2021-01-12 | End: 2021-01-12

## 2021-01-12 RX ORDER — FENTANYL CITRATE 50 UG/ML
100 INJECTION, SOLUTION INTRAMUSCULAR; INTRAVENOUS
Status: DISCONTINUED | OUTPATIENT
Start: 2021-01-12 | End: 2021-01-12

## 2021-01-12 RX ORDER — KETOROLAC TROMETHAMINE 30 MG/ML
15 INJECTION, SOLUTION INTRAMUSCULAR; INTRAVENOUS ONCE
Status: COMPLETED | OUTPATIENT
Start: 2021-01-12 | End: 2021-01-12

## 2021-01-12 RX ORDER — DIPHENHYDRAMINE HYDROCHLORIDE 50 MG/ML
12.5 INJECTION, SOLUTION INTRAMUSCULAR; INTRAVENOUS
Status: COMPLETED | OUTPATIENT
Start: 2021-01-12 | End: 2021-01-12

## 2021-01-12 RX ADMIN — BUPIVACAINE HYDROCHLORIDE 200 MG: 5 INJECTION, SOLUTION EPIDURAL; INTRACAUDAL; PERINEURAL at 09:00

## 2021-01-12 RX ADMIN — MIDAZOLAM HYDROCHLORIDE 2 MG: 1 INJECTION, SOLUTION INTRAMUSCULAR; INTRAVENOUS at 09:05

## 2021-01-12 RX ADMIN — MIDAZOLAM HYDROCHLORIDE 1 MG: 1 INJECTION, SOLUTION INTRAMUSCULAR; INTRAVENOUS at 09:40

## 2021-01-12 RX ADMIN — FENTANYL CITRATE 50 MCG: 50 INJECTION, SOLUTION INTRAMUSCULAR; INTRAVENOUS at 09:46

## 2021-01-12 RX ADMIN — FENTANYL CITRATE 50 MCG: 50 INJECTION, SOLUTION INTRAMUSCULAR; INTRAVENOUS at 09:11

## 2021-01-12 RX ADMIN — LIDOCAINE HYDROCHLORIDE 400 MG: 20 INJECTION, SOLUTION INFILTRATION; PERINEURAL at 09:00

## 2021-01-12 RX ADMIN — FENTANYL CITRATE 50 MCG: 50 INJECTION, SOLUTION INTRAMUSCULAR; INTRAVENOUS at 09:05

## 2021-01-12 RX ADMIN — MIDAZOLAM HYDROCHLORIDE 2 MG: 1 INJECTION, SOLUTION INTRAMUSCULAR; INTRAVENOUS at 09:22

## 2021-01-12 RX ADMIN — DIPHENHYDRAMINE HYDROCHLORIDE 25 MG: 50 INJECTION, SOLUTION INTRAMUSCULAR; INTRAVENOUS at 08:38

## 2021-01-12 RX ADMIN — KETOROLAC TROMETHAMINE 15 MG: 30 INJECTION, SOLUTION INTRAMUSCULAR; INTRAVENOUS at 08:38

## 2021-01-12 RX ADMIN — SODIUM CHLORIDE 25 ML/HR: 9 INJECTION, SOLUTION INTRAVENOUS at 08:36

## 2021-01-12 RX ADMIN — FENTANYL CITRATE 50 MCG: 50 INJECTION, SOLUTION INTRAMUSCULAR; INTRAVENOUS at 09:22

## 2021-01-12 RX ADMIN — MIDAZOLAM HYDROCHLORIDE 2 MG: 1 INJECTION, SOLUTION INTRAMUSCULAR; INTRAVENOUS at 09:10

## 2021-01-12 RX ADMIN — CLINDAMYCIN IN 5 PERCENT DEXTROSE 900 MG: 18 INJECTION, SOLUTION INTRAVENOUS at 08:37

## 2021-01-12 RX ADMIN — MIDAZOLAM HYDROCHLORIDE 1 MG: 1 INJECTION, SOLUTION INTRAMUSCULAR; INTRAVENOUS at 09:31

## 2021-01-12 NOTE — PROGRESS NOTES
[facility] FU      Patient with cholangiocarcinoma s/p pylorus-preserving Whipple 10/2017; G_J George-EN Y revision 2020, HTN, DM, BPH and chronic LBP  * IP stay  -   - intractable pain ;  Celiac plexus block/ palliative care.    -Progressive stage IV disease- L5 metastasis, growth in paraaortic mass, worsening pain and small worrisome lung nodules. Planned apts:    Port a cath placement Mon ;  - IR kypho lumbar- ablation of L5 met;  Palliative ; 1/15 US- repeat celiac plexus block;   - start Chemo. Additional needs identified to be addressed with provider NA  none       Method of communication with provider : none    Care Transition Nurse (CTN) contacted the patient by telephone to follow up after admission on OP - Port-A Cath placement;   - Ablation of tumor on L4-5. Verified name and  with patient as identifiers. Addressed changes since last contact: follow up [id number] Cath placement and IR for Tumor in lumbar vertebrae  Discharge needs reviewed: none None  Follow up appointment completed? yes yes Was follow up appointment scheduled within 7 days of discharge? yes     Advance Care Planning:   Does patient have an Advance Directive:  reviewed and current     CTN reviewed discharge instructions, medical action plan and red flags with patient and discussed any barriers to care and/or understanding of plan of care after discharge. Discussed appropriate site of care based on symptoms and resources available to patient including: Patient has resources he needs. The patient agrees to contact the PCP office for questions related to their healthcare. * His back is currently numb from local anesthesia applied for ablation of L4-5 metastasis. Hopeful for good outcome and less pain. * He most likely will not require his apt 1/15 for additional celiac block. * New Port-A-Cath site 'looks good.'   C/D/I   * Ready to start Chemo.      * CM encourages patient to nap and enjoy pain free time while block is still working well. Patients top risk factors for readmission: medical condition, medication management and polypharmacy Progressive cancer  Interventions to address risk factors: Obtained and reviewed discharge summary and/or continuity of care documents    [facility] follow up appointment(s):   Future Appointments   Date Time Provider Sloane Marley   1/13/2021 11:30 AM Kurtis Henderson MD [facility] BS AMB   1/22/2021  9:00 AM CHAIR 3 [address] REG   1/22/2021  9:30 AM Priyank Felder NP [facility] BS AMB   1/26/2021  8:20 AM Cali Pugh NP [facility] BS AMB   1/29/2021  9:00 AM CHAIR 2 [address] REG   2/11/2021  9:00 AM CHAIR 2 [address] REG   2/18/2021 10:00 AM CHAIR 2 [address] REG     Non-BS follow up appointment(s): NA    FU call  Mon 2/1 based on severity of symptoms and risk factors. Complete [facility]/ restart CCM  Plan for next call: symptom management-Pain levels after ablation and follow up appointment-Palliative Care visit/  Start of Chemo  CTN provided contact information for future needs. Goals Addressed                 This Visit's Progress     Attends follow-up appointments as directed. Patient with cholangiocarcinoma s/p pylorus-preserving Whipple 10/2017; G_J George-EN Y revision 09/02/2020, HTN, DM, BPH and chronic LBP  * IP stay 12/29 - 12/31  ; Celiac plexus block/ palliative care. -Progressive stage IV disease- L5 metastasis, growth in paraaortic mass, worsening pain and small worrisome lung nodules    12/31-  For discharge today. * Apt with Onc Thursday.    - hopes for genetic workup from pathology to identify future treatment options. - For Port-a-cath insertion anticipating chemotherapy. * Apt with Rad Onc for possible palliative treatment. 1/4 - doing well at home/ Apt with Onc Thurs/  PICC placement Fri. IR for L5 leison is a possibility.     1/8 - Port a cath placement Mon 1/11;  1/12- IR kypho lumbar- ablation of L5 met; 1/13 Palliative ; 1/15 US- repeat celiac plexus block;  1/22 - start Chemo. 1/12  * His back is currently numb from local anesthesia applied for ablation of L4-5 metastasis. Hopeful for good outcome and less pain. * He most likely will not require his apt 1/15 for additional celiac block. * New Port-A-Cath site 'looks good.'   C/D/I   * Ready to start Chemo.

## 2021-01-12 NOTE — H&P
Radiology History and Physical    Patient: Josey Mcgarry 60 y.o. male       Chief Complaint: No chief complaint on file. History of Present Illness: L5 lesion, for bx ablation and vertebroplasty. H/O cholangiocarcincoma. History:    Past Medical History:   Diagnosis Date    Aneurysm ([facility] [address] 2008    CEREBRAL    Arrhythmia     Previous a.fib, ablation, NSR now; NO LONGER FOLLOWED BY CARDIOLOGIST.     Autoimmune disease ([facility]     SJOGREN'S    Cancer ([facility] 2020    COMMON BILE DUCT, ADENOCARCINOMA, SPINE    Diabetes (HCC)     NIDDM    Family history of skin cancer     Hypertension     Sepsis ([facility] 2017    STENTING TO BILE DUCT    Status post chemotherapy     Stroke [facility]) 2008    brain aneurysm - no deficits    Sun-damaged skin     Sunburn, blistering      Family History   Problem Relation Age of Onset    Cancer Father         Breast and Colon, MELANOMA    Hypertension Father     Cancer Mother         LEUKEMIA    No Known Problems Sister     Atrial Fibrillation Brother     No Known Problems Sister     No Known Problems Son     No Known Problems Son     Anesth Problems Neg Hx      Social History     Socioeconomic History    Marital status:      Spouse name: Not on file    Number of children: Not on file    Years of education: Not on file    Highest education level: Not on file   Occupational History    Not on file   Social Needs    Financial resource strain: Not on file    Food insecurity     Worry: Not on file     Inability: Not on file   [employer] needs     Medical: Not on file     Non-medical: Not on file   Tobacco Use    Smoking status: Never Smoker    Smokeless tobacco: Never Used   Substance and Sexual Activity    Alcohol use: Never     Frequency: Never     Comment: very rare    Drug use: No    Sexual activity: Yes     Partners: Female   Lifestyle    Physical activity     Days per week: Not on file     Minutes per session: Not on file    Stress: Not on file Relationships    Social connections     Talks on phone: Not on file     Gets together: Not on file     Attends L[employer]-day service: Not on file     Active member of club or organization: Not on file     Attends meetings of clubs or organizations: Not on file     Relationship status: Not on file    Intimate partner violence     Fear of current or ex partner: Not on file     Emotionally abused: Not on file     Physically abused: Not on file     Forced sexual activity: Not on file   Other Topics Concern    Not on file   Social History Narrative    Not on file       Allergies: Allergies   Allergen Reactions    Shellfish Derived Anaphylaxis     Soft shell crabs    Morphine Nausea and Vomiting    Pcn [Penicillins] Other (comments)     Pt stated \"always been told PCN\"  Pt tolerated other cephalosporins in the past       Current Medications:  Current Outpatient Medications   Medication Sig    pantoprazole (PROTONIX) 40 mg tablet TAKE 1 TABLET BY MOUTH EVERY DAY    lidocaine-prilocaine (EMLA) topical cream Apply  to affected area as needed for Pain. 30-45 min prior to treatments    HYDROmorphone (DILAUDID) 8 mg tablet Take 1 Tab by mouth every four (4) hours as needed for Pain for up to 15 days. Max Daily Amount: 48 mg.    dutasteride (AVODART) 0.5 mg capsule Take 1 Cap by mouth daily.  Creon 36,000-114,000- 180,000 unit cpDR capsule Take 4 Caps by mouth three (3) times daily (with meals). 2-3 caps each meal and 1 cap for snacks (see additional order)    aluminum & magnesium hydroxide-simethicone (Maalox Maximum Strength) 400-400-40 mg/5 mL suspension Take 10 mL by mouth every six (6) hours as needed for Indigestion.  vitamin A (AQUASOL A) 10,000 unit capsule Take 1 Cap by mouth daily.  vitamin E (AQUA GEMS) 400 unit capsule Take 1 Cap by mouth daily.  empagliflozin (Jardiance) 25 mg tablet Take 1 Tab by mouth nightly.  gabapentin (NEURONTIN) 100 mg capsule Take 3 capsules by mouth twice a day.  cyanocobalamin (VITAMIN B12) 1,000 mcg/mL injection INJECT CONTENTS OF ONE VIAL INTRAMUSCULARLY EVERY OTHER WEEK (Patient taking differently: INJECT CONTENTS OF ONE VIAL INTRAMUSCULARLY EVERY OTHER WEEK (FIRST AND 15TH OF EACH MONTH))    tamsulosin (FLOMAX) 0.4 mg capsule TAKE ONE CAPSULE BY MOUTH EVERY EVENING    ramipriL (ALTACE) 5 mg capsule Take 1 Cap by mouth daily. (Patient taking differently: Take 5 mg by mouth nightly.)    lidocaine (LIDODERM) 5 % 1 Patch by TransDERmal route every twenty-four (24) hours. Apply patch to the affected area for 12 hours a day and remove for 12 hours a day. (Patient taking differently: 1 Patch by TransDERmal route daily as needed. Apply patch to the affected area for 12 hours a day and remove for 12 hours a day.)    loperamide (IMODIUM) 2 mg capsule Take 2-4 mg by mouth as needed for Diarrhea. Give 4 mg after first loose stool. Give an additional 2 mg after each loose stool as needed up to a maximum of 8 doses (16 mg/day).  lipase-protease-amylase (Creon) 36,000-114,000- 180,000 unit cpDR capsule Take 1 Cap by mouth as needed (for snacks). 2-3 caps each meal and 1 cap for snacks (see additional order)    finasteride (PROSCAR) 5 mg tablet TAKE 1 TABLET BY MOUTH EVERY DAY    acetaminophen (TYLENOL) 325 mg tablet Take 2 Tabs by mouth every four (4) hours as needed for Pain or Fever (Over the counter medication).  alpha-D-galactosidase (BEANO PO) Take 1 Tab by mouth two (2) times a day.  cetirizine (ZYRTEC) 10 mg tablet Take 10 mg by mouth nightly.  ondansetron (ZOFRAN ODT) 4 mg disintegrating tablet Take 1 Tab by mouth every eight (8) hours as needed for Nausea or Vomiting.  prochlorperazine (Compazine) 10 mg tablet Take 0.5 Tabs by mouth every six (6) hours as needed for Nausea or Vomiting for up to 60 days.  oxyCODONE ER (OxyCONTIN) 40 mg ER tablet Take 1 Tab by mouth every eight (8) hours for 30 days.  Max Daily Amount: 120 mg.    dexAMETHasone (DECADRON) 1 mg tablet Take 2 tablets by mouth twice daily for 14 days    naloxone (NARCAN) 4 mg/actuation nasal spray Use 1 spray intranasally, then discard. Repeat with new spray every 2 min as needed for opioid overdose symptoms, alternating nostrils.  sildenafil citrate (VIAGRA) 50 mg tablet Take 1 Tab by mouth as needed (ED). Current Facility-Administered Medications   Medication Dose Route Frequency    0.9% sodium chloride infusion  25 mL/hr IntraVENous CONTINUOUS    fentaNYL citrate (PF) injection 100 mcg  100 mcg IntraVENous Multiple    midazolam (PF) (VERSED) injection 0-10 mg  0-10 mg IntraVENous Multiple    heparinized saline 2 units/mL infusion 800 Units  400 mL Irrigation ONCE        Physical Exam:  Blood pressure (!) 138/97, pulse 78, temperature 98.1 °F (36.7 °C), resp. rate 18, height 5' 8\" (1.727 m), weight 65.8 kg (145 lb), SpO2 99 %. GENERAL: alert, cooperative, no distress, appears stated age  LUNG: clear to auscultation bilaterally  HEART: regular rate and rhythm  ABD: Non tender, non distended. Alerts:    Hospital Problems  Date Reviewed: 11/17/2020    None          Laboratory:    No results for input(s): HGB, HCT, WBC, PLT, INR, BUN, CREA, K, CRCLT, HGBEXT, HCTEXT, PLTEXT, INREXT in the last 72 hours. No lab exists for component: PTT, PT      Plan of Care/Planned Procedure:  Risks, benefits, and alternatives reviewed with patient and he agrees to proceed with the procedure. Deemed appropriate or moderate sedation with versed and fentanyl.       Mario Viera MD

## 2021-01-12 NOTE — DISCHARGE INSTRUCTIONS
[facility]  Department of Interventional Radiology  [phone]    Radiologist: Dr. Pavithra Chopra    Date: 1/12/2021       Kyphoplasty Discharge Instructions    Go home and rest  and restrict your activity the next 24 hours. You have been given sedating medications, so do not drive or drink alcohol today. Resume your previous diet and medications. You may take Tylenol, as directed on the label, for pain or discomfort. Avoid Ibuprofen (Advil, Motrin etc.) and Aspirin today as they may increase your risk of bleeding. Avoid heavy lifting (nothing greater than 5 pounds),  excessive bending,  and pushing or pulling movements for one week. Then begin to advance your activity as tolerated. Be sure to follow up with your physician, and let him know how you are progressing. If a biopsy sample was collected, your results will be sent to your ordering physician. If you have new severe pain, numbness, tingling, or weakness in your legs go to the nearest emergency department, and tell them you have had a Kyphoplasty.

## 2021-01-13 ENCOUNTER — VIRTUAL VISIT (OUTPATIENT)
Dept: PALLATIVE CARE | Age: 66
End: 2021-01-13
Payer: COMMERCIAL

## 2021-01-13 DIAGNOSIS — R11.11 INTRACTABLE VOMITING WITHOUT NAUSEA, UNSPECIFIED VOMITING TYPE: ICD-10-CM

## 2021-01-13 DIAGNOSIS — M54.59 INTRACTABLE LOW BACK PAIN: Primary | ICD-10-CM

## 2021-01-13 DIAGNOSIS — R10.84 ABDOMINAL PAIN, GENERALIZED: ICD-10-CM

## 2021-01-13 DIAGNOSIS — R53.81 DEBILITY: ICD-10-CM

## 2021-01-13 DIAGNOSIS — C22.1 METASTATIC CHOLANGIOCARCINOMA TO BONE (HCC): ICD-10-CM

## 2021-01-13 DIAGNOSIS — R63.0 ANOREXIA: ICD-10-CM

## 2021-01-13 DIAGNOSIS — C79.51 METASTATIC CHOLANGIOCARCINOMA TO BONE (HCC): ICD-10-CM

## 2021-01-13 PROCEDURE — 99215 OFFICE O/P EST HI 40 MIN: CPT | Performed by: INTERNAL MEDICINE

## 2021-01-13 NOTE — PROGRESS NOTES
Palliative Medicine Office Visit  Palliative Medicine Nurse Check In  [phone]    Patient Name: Kim Milian. YOB: 1955      Date of Office Visit: 1/13/2021    Patient states: \"  \"    From Check In Sheet (scanned in Media):  Has a medical provider talked with you about cardiopulmonary resuscitation (CPR)? [x] Yes   [] No   [] Unable to obtain    Nurse reminder to complete or update ACP FlowSheet:    Is ACP on the Problem List?    [x] Yes    [] No  IF ACP Document is ON FILE; Nurse to place ACP on Problem List     Is there an ACP Note in Chart Review/Note? [x] Yes    [] No   If NO: ALERT PROVIDER       Primary Decision MakerDonemo Quintanilla - [phone]    Secondary Decision Maker: Ridge Cristina III - Child - [phone]    Supplemental (Other) Decision Maker: Eli Patel Child - [phone]  Advance Care Planning 1/13/2021   Patient's [address] is: Named in scanned ACP document   Primary Decision Maker Name -   Primary Decision Maker Phone Number -   Primary Decision Maker Relationship to Patient -   Confirm Advance Directive Yes, on file   Patient Would Like to Complete Advance Directive -   Does the patient have other document types -         Is there anything that we should know about you as a person in order to provide you the best care possible? Have you been to the ER, urgent care clinic since your last visit? [] Yes   [x] No   [] Unable to obtain    Have you been hospitalized since your last visit? [] Yes   [x] No   [] Unable to obtain    Have you seen or consulted any other health care providers outside of the [address] [place] [address] since your last visit?    [] Yes   [x] No   [] Unable to obtain    Functional status (describe):       Last BM: 1/12/2021     accessed (date): 1/13/2021    Bottle review (for opioid pain medication):  Medication 1:   Date filled:   Directions:   # filled:   # left:   # pills taking per day:  Last dose taken:    Medication 2:   Date filled:   Directions:   # filled:   # left:   # pills taking per day:  Last dose taken:    Medication 3:   Date filled:   Directions:   # filled:   # left:   # pills taking per day:  Last dose taken:    Medication 4:   Date filled:   Directions:   # filled:   # left:   # pills taking per day:  Last dose taken:

## 2021-01-13 NOTE — PROGRESS NOTES
Palliative Medicine Outpatient Services  [place]: [phone] ([phone])    Patient Name: Tomas Coffey. YOB: 1955    Date of Current Visit: 01/13/21  Location of Current Visit:    [] [address] [place] [address] Office  [] [facility] Office  [] [address] [place] [address]  [] Home  [x] Other: Virtual synchronous A/V visit    Date of Initial Visit: 4/6/2020  Referral from: Dr. Angeles Olson  Primary Care Physician: Katerina Kang DO      SUMMARY:   Tomas Leigh is a 60y.o. 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 Angeles Olson for management of symptoms and psychosocial support. 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 This is second marriage for both of them. Current treatment-completed RT to the para-aortic mass, pursuing diagnostics with GI physician Dr. Lynda Contreras for gastroparesis and pancreatic enzymes, has had biliary stents replaced. Status post celiac plexus block and reversal of Whipple procedure on 9/9/2020    Hospitalization at [address] [place] [address] for uncontrolled pain in December 2020    L5 biopsy, ablation and kyphoplasty on 1/12/2021    Current treatment-about to start treatment with cisplatin plus gemcitabine plus Abraxane       PALLIATIVE DIAGNOSES:       ICD-10-CM ICD-9-CM    1. Intractable low back pain  M54.5 724.2    2. Metastatic cholangiocarcinoma to bone (HCC)  C22.1 155.1     C79.51 198.5    3. Abdominal pain, generalized  R10.84 789.07    4.  Intractable vomiting without nausea, unspecified vomiting type R11.11 787.03    5. Anorexia  R63.0 783.0    6. Debility  R53.81 799.3           PLAN:   Patient Instructions     Dear [place]. ,    It was a pleasure seeing you today in your home along with your wife virtually    We will see you again in 2 weeks virtually on 2021    If labs or imaging tests have been ordered for you today, please call the office  at [phone] 48 hours after completion to obtain the results. Your stated goal:   - better pain management    Your described symptoms were: Fatigue: 6 Drowsiness: 5   Depression: 0 Pain: 6   Anxiety: 0 Nausea: 0   Anorexia: 0 Dyspnea: 0   Best Well-Bein Constipation: Yes   Other Problem (Comment): 0       This is the plan we talked about:    1. Acute low back pain from new L5 met status post ablation and kyphoplasty  -We reviewed your hospitalization, you are currently on OxyContin 40 mg 2 times a day and Dilaudid 8 mg every 3-4 hours. You are taking Dilaudid every 3 hours today given increased pain from the L5 biopsy and procedure yesterday. This is expected to improve slightly over the next few days. Once the pain is better, take Dilaudid 8 mg every 5-6 hours and you can always take half a tablet in the middle if need be for severe pain.  -Continue OxyContin 40 mg 2 times a day. You have enough of both these medications. Our goal will be to wean you off Dilaudid slowly, the next prescription fill will be for 4 mg tablet so you can take 4 to 6 mg as needed. And then we will start weaning you off OxyContin based on how your pain is controlled. 2.  Opioid-induced constipation  -You usually have functional diarrhea but now with increasing opioid use, you are struggling with constipation. Please have 1 mg 1 prune-juice every morning, you are unable to eat a high-fiber diet due to the reversal of Whipple procedure. You can use MiraLAX every day along with 1 stool softener pill. Please stop both once you have diarrhea.       3.  Renal colic  -Thankfully, this is resolved. The urologist signed off on you. 4. Functional diarrhea  -This is better since starting Creon    5. Sjogren's disease  -You have a tendency to get dehydrated very quickly. We talked about this in detail today including how to be proactive and drink more than a liter of water per day, have Gatorade and lemonade at home. Once you start chemotherapy, if you are not able to keep up with your oral fluid intake, please let us know so we can arrange for you to receive IV fluids in the infusion center or even at home if your insurance allows. We talked about the use of dispatch health which is more for urgent care needs only and not for nonurgent needs. It is better to call us so we can arrange the care that you need proactively. 6.  Multivitamin deficiency  -You were found to have severe vitamin A and vitamin D deficiency. You are on replacement therapy now. Please discuss with your primary care doctor about rechecking the levels and continuing prescription level replacement. 7. We completed an AMD naming your wife as Nikko. 8.  Disease progression  -We reviewed your scans and plan for chemotherapy in detail. You are starting chemotherapy this Friday. We talked about potential side effects including nausea, vomiting, diarrhea and dehydration along with fatigue. Given your Sjogren's history, you are at high risk for further dehydration and cramping. We talked about how to stay proactive about this. You do have Zofran and Compazine at home.           This is what you have shared with us about Advance Care Planning:      Primary Decision Maker: Arin Alejandra Spouse - [phone]    Secondary Decision Maker: Ridge Cristina III - Child - [phone]    Supplemental (Other) Decision Maker: Toney Fabry Child - [phone]  Advance Care Planning 1/13/2021   Patient's [address] is: Named in scanned ACP document   Primary Decision Maker Name -   Primary Decision Maker Phone Number -   Primary Decision Maker Relationship to Patient -   Confirm Advance Directive Yes, on file   Patient Would Like to Complete Advance Directive -   Does the patient have other document types -           The Palliative Medicine Team is here to support you and your family. Sincerely,      Delmis Gutiérrez MD and the Palliative Medicine Team       GOALS OF CARE / TREATMENT PREFERENCES:   [====Goals of Care====]  GOALS OF CARE:  Patient / health care proxy stated goals: See Patient Instructions / Summary    TREATMENT PREFERENCES:   Code Status:  [x] Attempt Resuscitation       [] Do Not Attempt Resuscitation    Advance Care Planning:  [x] The [facility] Interdisciplinary Team has updated the ACP Navigator with Decision Maker and Patient Capacity      Primary Decision MakerOlivia Muta - [phone]    Secondary Decision Maker: Ridge Cristina III - Child - [phone]    Supplemental (Other) Decision Maker: Johnnyrialyssa Sow Child - [phone]  Advance Care Planning 1/13/2021   Patient's Healthcare Decision Maker is: Named in scanned ACP document   Primary Decision Maker Name -   Primary Decision Maker Phone Number -   Primary Decision Maker Relationship to Patient -   Confirm Advance Directive Yes, on file   Patient Would Like to Complete Advance Directive -   Does the patient have other document types -       Other:  (If patient appropriate for POST, consider using PALLPOST smart phrase here)    The palliative care team has discussed with patient / health care proxy about goals of care / treatment preferences for patient.  [====Goals of Care====]     PRESCRIPTIONS GIVEN:     No orders of the defined types were placed in this encounter.           FOLLOW UP:     Future Appointments   Date Time Provider Sloane Roach   1/22/2021  9:00 AM CHAIR 3 [address] REG   1/22/2021  9:30 AM Ortega Johnson NP ONC BS AMB   1/26/2021  8:20 AM Anna Marie Vazquez NP [facility] BS AMB 1/27/2021  2:30 PM Dmitriy Nolan MD [facility]\""C BS AMB   1/29/2021  9:00 AM CHAIR 2 [address] REG   2/11/2021  9:00 AM CHAIR 2 [address] REG   2/18/2021 10:00 AM CHAIR 2 [facility]           PHYSICIANS INVOLVED IN CARE:   Patient Care Team:  Merly Wisdom DO as PCP - General (Internal Medicine)  Merly Wisdom DO as PCP - [facility]  Zach Al MD (General Surgery)  Ailyn Alfonso MD (Gastroenterology)  Brady Shankar MD (Gastroenterology)  Elian Burch MD (Hematology and Oncology)  Dmitriy Nolan MD as Palliative Care (Palliative Medicine)  Kaleb Hunter, RN as Benefits Care Manager       HISTORY:   Reviewed patient-completed ESAS and advance care planning form. Reviewed patient record in prescription monitoring program.    CHIEF COMPLAINT: No chief complaint on file. HPI/SUBJECTIVE:    The patient is: [x] Verbal / [] Nonverbal     Patient seen virtually at home along with his wife. We reviewed the events since her previous visit, I had discussed his worsening low back pain with Dr. Sam Edmond after our last visit which led to ordering of scans but patient was admitted to [facility] due to severe low back pain, he was seen by our palliative medicine team and pain was controlled with both IV and p.o. medications. Knowing the etiology of pain helped him relax a little bit, now he is focused on getting treatment started and getting the disease under control. He had ablation and kyphoplasty yesterday and hoping that that will help with pain control as well. He is taking stool softeners as needed for constipation, the combination of diarrhea from his pancreatitis/Whipple reversal and staying on high-dose opioids is keeping him on a regular bowel movement schedule.   He is worried about dehydration and muscle cramping given his history of Sjogren's disease and initiation of chemotherapy.  -------    Patient here with his wife. Both are very good historians. Mid upper back pain-much improved since radiation to the periaortic mass. He struggled with pain for several months before it was identified as cancer recurrence. He was started on fentanyl 25 mcg patch which he wants to wean off of. He has made upper and mid zone abdominal pain which comes and goes. He has not noticed any specific pattern to the pain but usually the pain comes on before vomiting or relieves without vomiting. Sometimes, he vomits food that he ate about 12 to 14 hours ago. No constipation. He has 2 liquid bowel movements every day. He is unable to tolerate eggs or honey. He is getting tests done to evaluate his pancreatic enzymes. He has very good support through his wife. Clinical Pain Assessment (nonverbal scale for nonverbal patients):   [++++ Clinical Pain Assessment++++]  [++++Pain Severity++++]: Pain: 6  [++++Pain Character++++]: cramping  [++++Pain Duration++++]: months  [++++Pain Effect++++]: functional   [++++Pain Factors++++]: none in particular  [++++Pain Frequency++++]: on and off  [++++Pain Location++++]: upper abdomen and mid back  [++++ Clinical Pain Assessment++++]       FUNCTIONAL ASSESSMENT:     Palliative Performance Scale (PPS):  PPS: 70       PSYCHOSOCIAL/SPIRITUAL SCREENING:     Any spiritual / Confucianism concerns:  [] Yes /  [x] No    Caregiver Burnout:  [] Yes /  [x] No /  [] No Caregiver Present      Anticipatory grief assessment:   [x] Normal  / [] Maladaptive       ESAS Anxiety: Anxiety: 0    ESAS Depression: Depression: 0       REVIEW OF SYSTEMS:     The following systems were [x] reviewed / [] unable to be reviewed  Systems: constitutional, ears/nose/mouth/throat, respiratory, gastrointestinal, genitourinary, musculoskeletal, integumentary, neurologic, psychiatric, endocrine. Positive findings noted below.   Modified ESAS Completed by: provider   Fatigue: 6 Drowsiness: 5 Depression: 0 Pain: 6   Anxiety: 0 Nausea: 0   Anorexia: 0 Dyspnea: 0   Best Well-Bein Constipation: Yes   Other Problem (Comment): 0          PHYSICAL EXAM:     Wt Readings from Last 3 Encounters:   21 145 lb (65.8 kg)   21 150 lb (68 kg)   21 151 lb 3.2 oz (68.6 kg)     There were no vitals taken for this visit. Last bowel movement: See Nursing Note    Constitutional    [x] Appears well-developed and well-nourished in no apparent distress    [] Abnormal:  Mental status  [x] Alert and awake  [x] Oriented to person/place/time  [x] Able to follow commands  [] Abnormal:   Eyes  [x] EOM normal   [x] Sclera normal   [x] No visible ocular discharge  [] Abnormal:   HENT  [x] Normocephalic, atraumatic  [x] Mouth/Throat: Moist mucous membranes   [x] External Ears normal  [] Abnormal:  Neck  [x] No visualized mass  [] Abnormal:  Pulmonary/Chest   [x] Respiratory effort normal  [x] No visualized signs of difficulty breathing or respiratory distress  [] Abnormal:  Musculoskeletal  [x] Normal gait with no signs of ataxia  [x] Normal range of motion of neck  [] Abnormal:  Neurological:   [x] No facial asymmetry (Cranial nerve 7 motor function)  [x] No gaze palsy  [] Abnormal:   Skin  [x] No significant exanthematous lesions or discoloration noted on facial skin  [] Abnormal:                                  Psychiatric  [x] Normal affect  [x] No hallucinations  [] Abnormal:    Other pertinent observable physical exam findings:    Due to this being a TeleHealth evaluation, many elements of the physical examination are unable to be assessed. HISTORY:     Past Medical History:   Diagnosis Date    Aneurysm ([address]     CEREBRAL    Arrhythmia     Previous a.fib, ablation, NSR now; NO LONGER FOLLOWED BY CARDIOLOGIST.     Autoimmune disease ([address]     SJOGREN'S    Cancer ([facility] 2020    COMMON BILE DUCT, ADENOCARCINOMA, SPINE    Diabetes (HCC)     NIDDM    Family history of skin cancer     Hypertension     Sepsis ([address] 2017    STENTING TO BILE DUCT    Status post chemotherapy     Stroke [facility]) 2008    brain aneurysm - no deficits    Sun-damaged skin     Sunburn, blistering       Past Surgical History:   Procedure Laterality Date    HX CHOLECYSTECTOMY      HX GI      COLONOSCOPY, POLYPS (BENIGN)    HX GI  10/06/2017    [facility]     HX GI  2020    WHIPPLE REVISION    HX HEART CATHETERIZATION  2005    Ablation     HX HERNIA REPAIR  12/2020    HERNIA REPAIR    HX ORTHOPAEDIC  2000    Spinal fusion W/ HARDWARE    HX OTHER SURGICAL  11/07/2017    [place] Cath, Dr. Prado Apo  2017    PORTACATH - then removed    IR KYPHOPLASTY LUMBAR  1/12/2021    NEUROLOGICAL PROCEDURE UNLISTED  2005    Arbuckle hole washout from cerebral hemorrhage    MA ABDOMEN SURGERY PROC UNLISTED  10/2017    APRIL      Family History   Problem Relation Age of Onset    Cancer Father         Breast and Colon, MELANOMA    Hypertension Father     Cancer Mother         LEUKEMIA    No Known Problems Sister     Atrial Fibrillation Brother     No Known Problems Sister     No Known Problems Son     No Known Problems Son     Anesth Problems Neg Hx       History reviewed, no pertinent family history. Social History     Tobacco Use    Smoking status: Never Smoker    Smokeless tobacco: Never Used   Substance Use Topics    Alcohol use: Never     Frequency: Never     Comment: very rare     Allergies   Allergen Reactions    Shellfish Derived Anaphylaxis     Soft shell crabs    Morphine Nausea and Vomiting    Pcn [Penicillins] Other (comments)     Pt stated \"always been told PCN\"  Pt tolerated other cephalosporins in the past      Current Outpatient Medications   Medication Sig    pantoprazole (PROTONIX) 40 mg tablet TAKE 1 TABLET BY MOUTH EVERY DAY    lidocaine-prilocaine (EMLA) topical cream Apply  to affected area as needed for Pain.  30-45 min prior to treatments    ondansetron (ZOFRAN ODT) 4 mg disintegrating tablet Take 1 Tab by mouth every eight (8) hours as needed for Nausea or Vomiting.  prochlorperazine (Compazine) 10 mg tablet Take 0.5 Tabs by mouth every six (6) hours as needed for Nausea or Vomiting for up to 60 days.  HYDROmorphone (DILAUDID) 8 mg tablet Take 1 Tab by mouth every four (4) hours as needed for Pain for up to 15 days. Max Daily Amount: 48 mg.    oxyCODONE ER (OxyCONTIN) 40 mg ER tablet Take 1 Tab by mouth every eight (8) hours for 30 days. Max Daily Amount: 120 mg.    dexAMETHasone (DECADRON) 1 mg tablet Take 2 tablets by mouth twice daily for 14 days    dutasteride (AVODART) 0.5 mg capsule Take 1 Cap by mouth daily.  Creon 36,000-114,000- 180,000 unit cpDR capsule Take 4 Caps by mouth three (3) times daily (with meals). 2-3 caps each meal and 1 cap for snacks (see additional order)    naloxone (NARCAN) 4 mg/actuation nasal spray Use 1 spray intranasally, then discard. Repeat with new spray every 2 min as needed for opioid overdose symptoms, alternating nostrils.  aluminum & magnesium hydroxide-simethicone (Maalox Maximum Strength) 400-400-40 mg/5 mL suspension Take 10 mL by mouth every six (6) hours as needed for Indigestion.  vitamin A (AQUASOL A) 10,000 unit capsule Take 1 Cap by mouth daily.  vitamin E (AQUA GEMS) 400 unit capsule Take 1 Cap by mouth daily.  empagliflozin (Jardiance) 25 mg tablet Take 1 Tab by mouth nightly.  gabapentin (NEURONTIN) 100 mg capsule Take 3 capsules by mouth twice a day.  cyanocobalamin (VITAMIN B12) 1,000 mcg/mL injection INJECT CONTENTS OF ONE VIAL INTRAMUSCULARLY EVERY OTHER WEEK (Patient taking differently: INJECT CONTENTS OF ONE VIAL INTRAMUSCULARLY EVERY OTHER WEEK (FIRST AND 15TH OF EACH MONTH))    tamsulosin (FLOMAX) 0.4 mg capsule TAKE ONE CAPSULE BY MOUTH EVERY EVENING    ramipriL (ALTACE) 5 mg capsule Take 1 Cap by mouth daily.  (Patient taking differently: Take 5 mg by mouth nightly.)    lidocaine (LIDODERM) 5 % 1 Patch by TransDERmal route every twenty-four (24) hours. Apply patch to the affected area for 12 hours a day and remove for 12 hours a day. (Patient taking differently: 1 Patch by TransDERmal route daily as needed. Apply patch to the affected area for 12 hours a day and remove for 12 hours a day.)    loperamide (IMODIUM) 2 mg capsule Take 2-4 mg by mouth as needed for Diarrhea. Give 4 mg after first loose stool. Give an additional 2 mg after each loose stool as needed up to a maximum of 8 doses (16 mg/day).  lipase-protease-amylase (Creon) 36,000-114,000- 180,000 unit cpDR capsule Take 1 Cap by mouth as needed (for snacks). 2-3 caps each meal and 1 cap for snacks (see additional order)    finasteride (PROSCAR) 5 mg tablet TAKE 1 TABLET BY MOUTH EVERY DAY    acetaminophen (TYLENOL) 325 mg tablet Take 2 Tabs by mouth every four (4) hours as needed for Pain or Fever (Over the counter medication).  alpha-D-galactosidase (BEANO PO) Take 1 Tab by mouth two (2) times a day.  cetirizine (ZYRTEC) 10 mg tablet Take 10 mg by mouth nightly.  sildenafil citrate (VIAGRA) 50 mg tablet Take 1 Tab by mouth as needed (ED). No current facility-administered medications for this visit. LAB DATA REVIEWED:     Lab Results   Component Value Date/Time    WBC 5.1 12/31/2020 04:46 AM    HGB 13.4 12/31/2020 04:46 AM    PLATELET 675 (L) 55/07/3128 04:46 AM     Lab Results   Component Value Date/Time    Sodium 135 (L) 12/31/2020 04:46 AM    Potassium 4.0 12/31/2020 04:46 AM    Chloride 105 12/31/2020 04:46 AM    CO2 26 12/31/2020 04:46 AM    BUN 12 12/31/2020 04:46 AM    Creatinine 0.67 (L) 12/31/2020 04:46 AM    Calcium 8.6 12/31/2020 04:46 AM    Magnesium 2.4 12/31/2019 07:37 AM    Phosphorus 4.0 08/18/2018 04:20 AM      Lab Results   Component Value Date/Time    Alk.  phosphatase 155 (H) 12/31/2020 04:46 AM    Protein, total 6.0 (L) 12/31/2020 04:46 AM    Albumin 3.1 (L) 12/31/2020 04:46 AM    Globulin 2.9 12/31/2020 04:46 AM     Lab Results   Component Value Date/Time    INR 1.1 09/09/2020 02:15 AM    Prothrombin time 11.5 (H) 09/09/2020 02:15 AM    aPTT 29.9 09/09/2020 02:15 AM      Lab Results   Component Value Date/Time    Iron 31 (L) 01/02/2020 03:24 AM    TIBC 245 (L) 01/02/2020 03:24 AM    Iron % saturation 13 (L) 01/02/2020 03:24 AM    Ferritin 122 01/02/2020 03:24 AM      CT chest 12/29/2020  IMPRESSION:     1. Numerous tiny pulmonary parenchymal nodules right greater than left,  suspicious for metastatic disease.  2. Focal ill-defined opacity in the right upper lobe which may represent  infiltrate. Follow-up recommended, however.  3. Incidental findings in the upper abdomen described earlier same day.     CONTROLLED SUBSTANCES SAFETY ASSESSMENT (IF ON CONTROLLED SUBSTANCES):     Reviewed opioid safety handout:  [x] Yes   [] No  24 hour opioid dose >150mg morphine equivalent/day:  [] Yes   [x] No  Benzodiazepines:  [] Yes   [x] No  Sleep apnea:  [] Yes   [x] No  Urine Toxicology Testing within last 6 months:  [] Yes   [x] No  History of or new aberrant medication taking behaviors:  [] Yes   [x] No  Has Narcan been prescribed [x] Yes   [] No          Total time: 45 minutes   Counseling / coordination time: 40  > 50% counseling / coordination?:   Consent:  He and/or health care decision maker is aware that that he may receive a bill for this telehealth service, depending on his insurance coverage, and has provided verbal consent to proceed: Yes    CPT Codes 79875-42477 for Established Patients may apply to this Telehealth Visit  Pursuant to the emergency declaration under the [place] Act and the National Emergencies Act, 1135 waiver authority and the Coronavirus Preparedness and Response Supplemental Appropriations Act, this Virtual  Visit was conducted, with patient's consent, to reduce the patient's risk of exposure to COVID-19 and provide continuity of care for an established patient.    Services were provided through a video synchronous discussion virtually to substitute for in-person clinic visit.

## 2021-01-13 NOTE — PATIENT INSTRUCTIONS
Dear Esmer Lozoya. , 
 
It was a pleasure seeing you today in your home along with your wife virtually We will see you again in 2 weeks virtually on 2021 If labs or imaging tests have been ordered for you today, please call the office  at [phone] 48 hours after completion to obtain the results. Your stated goal:  
- better pain management Your described symptoms were: Fatigue: 6 Drowsiness: 5 Depression: 0 Pain: 6 Anxiety: 0 Nausea: 0 Anorexia: 0 Dyspnea: 0 Best Well-Bein Constipation: Yes Other Problem (Comment): 0 This is the plan we talked about: 1. Acute low back pain from new L5 met status post ablation and kyphoplasty 
-We reviewed your hospitalization, you are currently on OxyContin 40 mg 2 times a day and Dilaudid 8 mg every 3-4 hours. You are taking Dilaudid every 3 hours today given increased pain from the L5 biopsy and procedure yesterday. This is expected to improve slightly over the next few days. Once the pain is better, take Dilaudid 8 mg every 5-6 hours and you can always take half a tablet in the middle if need be for severe pain. 
-Continue OxyContin 40 mg 2 times a day. You have enough of both these medications. Our goal will be to wean you off Dilaudid slowly, the next prescription fill will be for 4 mg tablet so you can take 4 to 6 mg as needed. And then we will start weaning you off OxyContin based on how your pain is controlled. 2.  Opioid-induced constipation 
-You usually have functional diarrhea but now with increasing opioid use, you are struggling with constipation. Please have 1 mg 1 prune-juice every morning, you are unable to eat a high-fiber diet due to the reversal of Whipple procedure. You can use MiraLAX every day along with 1 stool softener pill. Please stop both once you have diarrhea. 3.  Renal colic 
-Thankfully, this is resolved. The urologist signed off on you. 4. Functional diarrhea -This is better since starting Creon 5. Sjogren's disease 
-You have a tendency to get dehydrated very quickly. We talked about this in detail today including how to be proactive and drink more than a liter of water per day, have Gatorade and lemonade at home. Once you start chemotherapy, if you are not able to keep up with your oral fluid intake, please let us know so we can arrange for you to receive IV fluids in the infusion center or even at home if your insurance allows. We talked about the use of dispatch health which is more for urgent care needs only and not for nonurgent needs. It is better to call us so we can arrange the care that you need proactively. 6.  Multivitamin deficiency 
-You were found to have severe vitamin A and vitamin D deficiency. You are on replacement therapy now. Please discuss with your primary care doctor about rechecking the levels and continuing prescription level replacement. 7. We completed an AMD naming your wife as Nikko. 8.  Disease progression 
-We reviewed your scans and plan for chemotherapy in detail. You are starting chemotherapy this Friday. We talked about potential side effects including nausea, vomiting, diarrhea and dehydration along with fatigue. Given your Sjogren's history, you are at high risk for further dehydration and cramping. We talked about how to stay proactive about this. You do have Zofran and Compazine at home. This is what you have shared with us about Advance Care Planning: 
 
  Primary Decision Maker: Baron Eng Spouse - [phone] Secondary Decision Maker: Vanessa Daniels - Child - [phone] Supplemental (Other) Decision Maker: Roberto Cristina - Child - [phone] Advance Care Planning 1/13/2021 Patient's Healthcare Decision Maker is: Named in scanned ACP document Primary Decision Maker Name -  
Primary Decision Maker Phone Number -  
Primary Decision Maker Relationship to Patient -  
 Confirm Advance Directive Yes, on file Patient Would Like to Complete Advance Directive - Does the patient have other document types - The Palliative Medicine Team is here to support you and your family.   
 
 
Sincerely, 
 
 
Crystal Major MD and the Palliative Medicine Team 
 neuromuscular

## 2021-01-15 ENCOUNTER — APPOINTMENT (OUTPATIENT)
Dept: INFUSION THERAPY | Age: 66
End: 2021-01-15
Payer: COMMERCIAL

## 2021-01-18 ENCOUNTER — TELEPHONE (OUTPATIENT)
Dept: PALLATIVE CARE | Age: 66
End: 2021-01-18

## 2021-01-18 DIAGNOSIS — C22.1 CHOLANGIOCARCINOMA (HCC): ICD-10-CM

## 2021-01-18 DIAGNOSIS — C22.1 CHOLANGIOCARCINOMA OF BILIARY TRACT (HCC): ICD-10-CM

## 2021-01-18 DIAGNOSIS — R10.9 INTRACTABLE ABDOMINAL PAIN: ICD-10-CM

## 2021-01-18 RX ORDER — OXYCODONE HCL 20 MG/1
40 TABLET, FILM COATED, EXTENDED RELEASE ORAL EVERY 12 HOURS
Qty: 120 TAB | Refills: 0 | Status: CANCELLED | OUTPATIENT
Start: 2021-01-18 | End: 2021-02-17

## 2021-01-18 RX ORDER — HYDROMORPHONE HYDROCHLORIDE 4 MG/1
6 TABLET ORAL
Qty: 180 TAB | Refills: 0 | Status: CANCELLED | OUTPATIENT
Start: 2021-01-18 | End: 2021-02-02

## 2021-01-18 NOTE — TELEPHONE ENCOUNTER
----- Message from Wade. Deo Yokastaalyssa. 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 No edema noted in right foot/ankle. His BP is 140/89. P Regular at 84. He is having pain managed by the pain medication. I wanted  you to be aware of this change. This is the first time he has experienced these symptoms. I also alerted Dr. Marylen Juba. Please let me know if you have any questions.   9 Rizwana Andrade

## 2021-01-18 NOTE — TELEPHONE ENCOUNTER
----- Message from Breann Cheung. 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 He has 3 days of medication remaining. Please send the prescription to [facility] at [facility] [phone]. Hydromorphone 4mg tabs. He is currently taking 8mg every 4 hours. We discussed  when we meet with you last week that you were going to order the 4mg tabs so we can taper his dose. Oxycontin ER 20 mg tabs. His dose is 40mg every 12 hours. His scrotal edema has decreased . He has slight edema in left foot/ankle. Please let us know if you have any question.   Thank you,  Manish Sanford

## 2021-01-18 NOTE — TELEPHONE ENCOUNTER
Triage for Controlled Substance Refill Request    Pain Diagnosis: _    Last Outpatient Visit: _    Next Outpatient Visit: _    Reason for refill needed outside of office visit?    -Pain escalation requiring increased medication  -Appointment not scheduled prior to need for scheduled refill  -Appointment scheduled but missed or moved prior to need for scheduled refill    Pharmacy: _    Medication:  Dose and directions:  Number dispensed:  Date filled ( or Pharmacy):  # left:    Medication:  Dose and directions:  Number dispensed:  Date filled ( or Pharmacy):  #left:     reviewed: _    Date of Urine Drug Screen:  _    Opioid Safety Handout given:  _    Appropriate for refill:  _    Action:  _

## 2021-01-20 ENCOUNTER — TELEPHONE (OUTPATIENT)
Dept: PALLATIVE CARE | Age: 66
End: 2021-01-20

## 2021-01-20 DIAGNOSIS — C22.1 CHOLANGIOCARCINOMA OF BILIARY TRACT (HCC): ICD-10-CM

## 2021-01-20 DIAGNOSIS — R10.9 INTRACTABLE ABDOMINAL PAIN: Primary | ICD-10-CM

## 2021-01-20 RX ORDER — OXYCODONE HCL 20 MG/1
40 TABLET, FILM COATED, EXTENDED RELEASE ORAL EVERY 12 HOURS
Qty: 60 TAB | Refills: 0 | Status: SHIPPED | OUTPATIENT
Start: 2021-01-20 | End: 2021-02-03 | Stop reason: SDUPTHER

## 2021-01-20 RX ORDER — HYDROMORPHONE HYDROCHLORIDE 4 MG/1
6 TABLET ORAL
Qty: 180 TAB | Refills: 0 | Status: SHIPPED | OUTPATIENT
Start: 2021-01-20 | End: 2021-02-04

## 2021-01-20 NOTE — TELEPHONE ENCOUNTER
Triage for Controlled Substance Refill Request    Pain Diagnosis: _Cholangiocarcinoma    Last Outpatient Visit: _1/13/2021    Next Outpatient Visit: _1/27/2021    Reason for refill needed outside of office visit? -Appointment not scheduled prior to need for scheduled refill      Pharmacy: _[facility] [address]    Medication:oxyCODONE ER (OxyCONTIN) 40 mg ER  Dose and directions: Take 1 Tab by mouth every eight (8) hours. Number dispensed:30  Date filled ( or Pharmacy):  # left:5    Medication:HYDROmorphone (DILAUDID) 8 mg  Dose and directions: Take 1 Tab by mouth every four (4) hours as needed   Number dispensed:90  Date filled ( or Pharmacy):  #left:10     reviewed: _yes     Date of Urine Drug Screen:  _n/a    Opioid Safety Handout given:  _yes     Appropriate for refill:  _yes     Action:  _ please refill

## 2021-01-20 NOTE — PROGRESS NOTES
Jessika Devlin. is a 60 y.o. male here for follow up for cholangiocarcinoma. Treatment today:  Cycle 1 Day 1 Cisplatin/Paclitaxel/Gemcitabine    1. Have you been to the ER, urgent care clinic since your last visit? Hospitalized since your last visit? no    2. Have you seen or consulted any other health care providers outside of the [address] [place] [address] since your last visit? Include any pap smears or colon screening. no    Pt received port 1/11/21  Kyphoplasty 1/12/21  Pt states he does still have some pain but is better. Pan meds are stretched our further apart.

## 2021-01-20 NOTE — TELEPHONE ENCOUNTER
The patient is calling to refill the following medications refilled: Dilaudid (10 remaining) and Oxycontin (5 remaining). To be called in to [facility].

## 2021-01-21 RX ORDER — SODIUM CHLORIDE 9 MG/ML
10 INJECTION INTRAMUSCULAR; INTRAVENOUS; SUBCUTANEOUS AS NEEDED
Status: CANCELLED | OUTPATIENT
Start: 2021-01-29

## 2021-01-21 RX ORDER — EPINEPHRINE 1 MG/ML
0.3 INJECTION, SOLUTION, CONCENTRATE INTRAVENOUS AS NEEDED
Status: CANCELLED | OUTPATIENT
Start: 2021-01-22

## 2021-01-21 RX ORDER — SODIUM CHLORIDE 9 MG/ML
25 INJECTION, SOLUTION INTRAVENOUS CONTINUOUS
Status: CANCELLED | OUTPATIENT
Start: 2021-01-29 | End: 2021-01-29

## 2021-01-21 RX ORDER — ACETAMINOPHEN 325 MG/1
650 TABLET ORAL AS NEEDED
Status: CANCELLED
Start: 2021-01-29

## 2021-01-21 RX ORDER — ONDANSETRON 2 MG/ML
8 INJECTION INTRAMUSCULAR; INTRAVENOUS AS NEEDED
Status: CANCELLED | OUTPATIENT
Start: 2021-01-22

## 2021-01-21 RX ORDER — ALBUTEROL SULFATE 0.83 MG/ML
2.5 SOLUTION RESPIRATORY (INHALATION) AS NEEDED
Status: CANCELLED
Start: 2021-01-22

## 2021-01-21 RX ORDER — SODIUM CHLORIDE 0.9 % (FLUSH) 0.9 %
10 SYRINGE (ML) INJECTION AS NEEDED
Status: CANCELLED | OUTPATIENT
Start: 2021-01-22 | End: 2021-01-22

## 2021-01-21 RX ORDER — ALBUTEROL SULFATE 0.83 MG/ML
2.5 SOLUTION RESPIRATORY (INHALATION) AS NEEDED
Status: CANCELLED
Start: 2021-01-29

## 2021-01-21 RX ORDER — EPINEPHRINE 1 MG/ML
0.3 INJECTION, SOLUTION, CONCENTRATE INTRAVENOUS AS NEEDED
Status: CANCELLED | OUTPATIENT
Start: 2021-01-29

## 2021-01-21 RX ORDER — SODIUM CHLORIDE 9 MG/ML
10 INJECTION INTRAMUSCULAR; INTRAVENOUS; SUBCUTANEOUS AS NEEDED
Status: CANCELLED | OUTPATIENT
Start: 2021-01-22

## 2021-01-21 RX ORDER — HEPARIN 100 UNIT/ML
300-500 SYRINGE INTRAVENOUS AS NEEDED
Status: CANCELLED
Start: 2021-01-29

## 2021-01-21 RX ORDER — SODIUM CHLORIDE 9 MG/ML
25 INJECTION, SOLUTION INTRAVENOUS CONTINUOUS
Status: CANCELLED | OUTPATIENT
Start: 2021-01-22 | End: 2021-01-22

## 2021-01-21 RX ORDER — PALONOSETRON 0.05 MG/ML
0.25 INJECTION, SOLUTION INTRAVENOUS ONCE
Status: CANCELLED | OUTPATIENT
Start: 2021-01-29 | End: 2021-01-29

## 2021-01-21 RX ORDER — DIPHENHYDRAMINE HYDROCHLORIDE 50 MG/ML
25 INJECTION, SOLUTION INTRAMUSCULAR; INTRAVENOUS AS NEEDED
Status: CANCELLED
Start: 2021-01-29

## 2021-01-21 RX ORDER — HYDROCORTISONE SODIUM SUCCINATE 100 MG/2ML
100 INJECTION, POWDER, FOR SOLUTION INTRAMUSCULAR; INTRAVENOUS AS NEEDED
Status: CANCELLED | OUTPATIENT
Start: 2021-01-29

## 2021-01-21 RX ORDER — HEPARIN 100 UNIT/ML
300-500 SYRINGE INTRAVENOUS AS NEEDED
Status: CANCELLED
Start: 2021-01-22

## 2021-01-21 RX ORDER — PALONOSETRON 0.05 MG/ML
0.25 INJECTION, SOLUTION INTRAVENOUS ONCE
Status: CANCELLED | OUTPATIENT
Start: 2021-01-22 | End: 2021-01-22

## 2021-01-21 RX ORDER — DIPHENHYDRAMINE HYDROCHLORIDE 50 MG/ML
25 INJECTION, SOLUTION INTRAMUSCULAR; INTRAVENOUS AS NEEDED
Status: CANCELLED
Start: 2021-01-22

## 2021-01-21 RX ORDER — ACETAMINOPHEN 325 MG/1
650 TABLET ORAL AS NEEDED
Status: CANCELLED
Start: 2021-01-22

## 2021-01-21 RX ORDER — HYDROCORTISONE SODIUM SUCCINATE 100 MG/2ML
100 INJECTION, POWDER, FOR SOLUTION INTRAMUSCULAR; INTRAVENOUS AS NEEDED
Status: CANCELLED | OUTPATIENT
Start: 2021-01-22

## 2021-01-21 RX ORDER — DIPHENHYDRAMINE HYDROCHLORIDE 50 MG/ML
50 INJECTION, SOLUTION INTRAMUSCULAR; INTRAVENOUS AS NEEDED
Status: CANCELLED
Start: 2021-01-29

## 2021-01-21 RX ORDER — DIPHENHYDRAMINE HYDROCHLORIDE 50 MG/ML
50 INJECTION, SOLUTION INTRAMUSCULAR; INTRAVENOUS AS NEEDED
Status: CANCELLED
Start: 2021-01-22

## 2021-01-21 RX ORDER — ONDANSETRON 2 MG/ML
8 INJECTION INTRAMUSCULAR; INTRAVENOUS AS NEEDED
Status: CANCELLED | OUTPATIENT
Start: 2021-01-29

## 2021-01-21 RX ORDER — SODIUM CHLORIDE 0.9 % (FLUSH) 0.9 %
10 SYRINGE (ML) INJECTION AS NEEDED
Status: CANCELLED | OUTPATIENT
Start: 2021-01-29 | End: 2021-01-29

## 2021-01-22 ENCOUNTER — HOSPITAL ENCOUNTER (OUTPATIENT)
Dept: INFUSION THERAPY | Age: 66
Discharge: HOME | End: 2021-01-22
Payer: COMMERCIAL

## 2021-01-22 ENCOUNTER — OFFICE VISIT (OUTPATIENT)
Dept: ONCOLOGY | Age: 66
End: 2021-01-22
Payer: COMMERCIAL

## 2021-01-22 VITALS
BODY MASS INDEX: 22.94 KG/M2 | HEART RATE: 77 BPM | RESPIRATION RATE: 18 BRPM | WEIGHT: 151.4 LBS | HEIGHT: 68 IN | TEMPERATURE: 97.5 F | SYSTOLIC BLOOD PRESSURE: 99 MMHG | DIASTOLIC BLOOD PRESSURE: 59 MMHG

## 2021-01-22 VITALS
BODY MASS INDEX: 22.97 KG/M2 | TEMPERATURE: 97.5 F | WEIGHT: 151.6 LBS | DIASTOLIC BLOOD PRESSURE: 65 MMHG | HEIGHT: 68 IN | SYSTOLIC BLOOD PRESSURE: 107 MMHG | HEART RATE: 80 BPM | RESPIRATION RATE: 18 BRPM

## 2021-01-22 DIAGNOSIS — G89.3 CANCER RELATED PAIN: ICD-10-CM

## 2021-01-22 DIAGNOSIS — C79.51 METASTATIC CANCER TO BONE (HCC): Primary | ICD-10-CM

## 2021-01-22 DIAGNOSIS — C22.1 CHOLANGIOCARCINOMA OF BILIARY TRACT (HCC): ICD-10-CM

## 2021-01-22 DIAGNOSIS — C22.1 CHOLANGIOCARCINOMA OF BILIARY TRACT (HCC): Primary | ICD-10-CM

## 2021-01-22 DIAGNOSIS — C24.9 CHOLANGIOCARCINOMA DETERMINED BY BIOPSY OF BILIARY TRACT (HCC): Primary | ICD-10-CM

## 2021-01-22 LAB
ALBUMIN SERPL-MCNC: 3.1 G/DL (ref 3.5–5)
ALBUMIN/GLOB SERPL: 1.1 {RATIO} (ref 1.1–2.2)
ALP SERPL-CCNC: 262 U/L (ref 45–117)
ALT SERPL-CCNC: 48 U/L (ref 12–78)
ANION GAP SERPL CALC-SCNC: 5 MMOL/L (ref 5–15)
AST SERPL-CCNC: 11 U/L (ref 15–37)
BASOPHILS # BLD: 0.1 K/UL (ref 0–0.1)
BASOPHILS NFR BLD: 1 % (ref 0–1)
BILIRUB SERPL-MCNC: 0.6 MG/DL (ref 0.2–1)
BUN SERPL-MCNC: 15 MG/DL (ref 6–20)
BUN/CREAT SERPL: 19 (ref 12–20)
CALCIUM SERPL-MCNC: 8.4 MG/DL (ref 8.5–10.1)
CHLORIDE SERPL-SCNC: 103 MMOL/L (ref 97–108)
CO2 SERPL-SCNC: 28 MMOL/L (ref 21–32)
CREAT SERPL-MCNC: 0.81 MG/DL (ref 0.7–1.3)
DIFFERENTIAL METHOD BLD: ABNORMAL
EOSINOPHIL # BLD: 0.2 K/UL (ref 0–0.4)
EOSINOPHIL NFR BLD: 5 % (ref 0–7)
ERYTHROCYTE [DISTWIDTH] IN BLOOD BY AUTOMATED COUNT: 13 % (ref 11.5–14.5)
GLOBULIN SER CALC-MCNC: 2.7 G/DL (ref 2–4)
GLUCOSE SERPL-MCNC: 172 MG/DL (ref 65–100)
HCT VFR BLD AUTO: 36.5 % (ref 36.6–50.3)
HGB BLD-MCNC: 11.7 G/DL (ref 12.1–17)
IMM GRANULOCYTES # BLD AUTO: 0.1 K/UL (ref 0–0.04)
IMM GRANULOCYTES NFR BLD AUTO: 1 % (ref 0–0.5)
LYMPHOCYTES # BLD: 0.6 K/UL (ref 0.8–3.5)
LYMPHOCYTES NFR BLD: 13 % (ref 12–49)
MCH RBC QN AUTO: 29.7 PG (ref 26–34)
MCHC RBC AUTO-ENTMCNC: 32.1 G/DL (ref 30–36.5)
MCV RBC AUTO: 92.6 FL (ref 80–99)
MONOCYTES # BLD: 0.4 K/UL (ref 0–1)
MONOCYTES NFR BLD: 9 % (ref 5–13)
NEUTS SEG # BLD: 3.5 K/UL (ref 1.8–8)
NEUTS SEG NFR BLD: 71 % (ref 32–75)
NRBC # BLD: 0 K/UL (ref 0–0.01)
NRBC BLD-RTO: 0 PER 100 WBC
PLATELET # BLD AUTO: 144 K/UL (ref 150–400)
PMV BLD AUTO: 10.5 FL (ref 8.9–12.9)
POTASSIUM SERPL-SCNC: 3.8 MMOL/L (ref 3.5–5.1)
PROT SERPL-MCNC: 5.8 G/DL (ref 6.4–8.2)
RBC # BLD AUTO: 3.94 M/UL (ref 4.1–5.7)
RBC MORPH BLD: ABNORMAL
SODIUM SERPL-SCNC: 136 MMOL/L (ref 136–145)
WBC # BLD AUTO: 4.9 K/UL (ref 4.1–11.1)

## 2021-01-22 PROCEDURE — 96413 CHEMO IV INFUSION 1 HR: CPT

## 2021-01-22 PROCEDURE — 96367 TX/PROPH/DG ADDL SEQ IV INF: CPT

## 2021-01-22 PROCEDURE — 96417 CHEMO IV INFUS EACH ADDL SEQ: CPT

## 2021-01-22 PROCEDURE — 80053 COMPREHEN METABOLIC PANEL: CPT

## 2021-01-22 PROCEDURE — 36415 COLL VENOUS BLD VENIPUNCTURE: CPT

## 2021-01-22 PROCEDURE — 85025 COMPLETE CBC W/AUTO DIFF WBC: CPT

## 2021-01-22 PROCEDURE — 74011000258 HC RX REV CODE- 258: Performed by: INTERNAL MEDICINE

## 2021-01-22 PROCEDURE — 74011250636 HC RX REV CODE- 250/636: Performed by: INTERNAL MEDICINE

## 2021-01-22 PROCEDURE — 96375 TX/PRO/DX INJ NEW DRUG ADDON: CPT

## 2021-01-22 PROCEDURE — 99215 OFFICE O/P EST HI 40 MIN: CPT | Performed by: INTERNAL MEDICINE

## 2021-01-22 PROCEDURE — 77030012965 HC NDL HUBR BBMI -A

## 2021-01-22 RX ORDER — DIPHENHYDRAMINE HYDROCHLORIDE 50 MG/ML
50 INJECTION, SOLUTION INTRAMUSCULAR; INTRAVENOUS AS NEEDED
Status: CANCELLED
Start: 2021-02-11

## 2021-01-22 RX ORDER — DIPHENHYDRAMINE HYDROCHLORIDE 50 MG/ML
25 INJECTION, SOLUTION INTRAMUSCULAR; INTRAVENOUS AS NEEDED
Status: CANCELLED
Start: 2021-02-18

## 2021-01-22 RX ORDER — SODIUM CHLORIDE 0.9 % (FLUSH) 0.9 %
10 SYRINGE (ML) INJECTION AS NEEDED
Status: CANCELLED | OUTPATIENT
Start: 2021-02-18 | End: 2021-02-18

## 2021-01-22 RX ORDER — DIPHENHYDRAMINE HYDROCHLORIDE 50 MG/ML
50 INJECTION, SOLUTION INTRAMUSCULAR; INTRAVENOUS AS NEEDED
Status: CANCELLED
Start: 2021-02-18

## 2021-01-22 RX ORDER — HEPARIN 100 UNIT/ML
300-500 SYRINGE INTRAVENOUS AS NEEDED
Status: ACTIVE | OUTPATIENT
Start: 2021-01-22 | End: 2021-01-22

## 2021-01-22 RX ORDER — SODIUM CHLORIDE 9 MG/ML
10 INJECTION INTRAMUSCULAR; INTRAVENOUS; SUBCUTANEOUS AS NEEDED
Status: ACTIVE | OUTPATIENT
Start: 2021-01-22 | End: 2021-01-22

## 2021-01-22 RX ORDER — ACETAMINOPHEN 325 MG/1
650 TABLET ORAL AS NEEDED
Status: CANCELLED
Start: 2021-02-11

## 2021-01-22 RX ORDER — HYDROMORPHONE HYDROCHLORIDE 8 MG/1
8 TABLET ORAL
Qty: 56 TAB | Refills: 0 | Status: SHIPPED | OUTPATIENT
Start: 2021-01-22 | End: 2021-01-29

## 2021-01-22 RX ORDER — HEPARIN 100 UNIT/ML
300-500 SYRINGE INTRAVENOUS AS NEEDED
Status: CANCELLED
Start: 2021-02-11

## 2021-01-22 RX ORDER — SODIUM CHLORIDE 9 MG/ML
25 INJECTION, SOLUTION INTRAVENOUS CONTINUOUS
Status: CANCELLED | OUTPATIENT
Start: 2021-02-18 | End: 2021-02-18

## 2021-01-22 RX ORDER — EPINEPHRINE 1 MG/ML
0.3 INJECTION, SOLUTION, CONCENTRATE INTRAVENOUS AS NEEDED
Status: CANCELLED | OUTPATIENT
Start: 2021-02-11

## 2021-01-22 RX ORDER — ONDANSETRON 2 MG/ML
8 INJECTION INTRAMUSCULAR; INTRAVENOUS AS NEEDED
Status: CANCELLED | OUTPATIENT
Start: 2021-02-18

## 2021-01-22 RX ORDER — PALONOSETRON 0.05 MG/ML
0.25 INJECTION, SOLUTION INTRAVENOUS ONCE
Status: COMPLETED | OUTPATIENT
Start: 2021-01-22 | End: 2021-01-22

## 2021-01-22 RX ORDER — SODIUM CHLORIDE 0.9 % (FLUSH) 0.9 %
10 SYRINGE (ML) INJECTION AS NEEDED
Status: CANCELLED | OUTPATIENT
Start: 2021-02-11 | End: 2021-02-11

## 2021-01-22 RX ORDER — HEPARIN 100 UNIT/ML
300-500 SYRINGE INTRAVENOUS AS NEEDED
Status: CANCELLED
Start: 2021-02-18

## 2021-01-22 RX ORDER — PALONOSETRON 0.05 MG/ML
0.25 INJECTION, SOLUTION INTRAVENOUS ONCE
Status: CANCELLED | OUTPATIENT
Start: 2021-02-18 | End: 2021-02-19

## 2021-01-22 RX ORDER — DIPHENHYDRAMINE HYDROCHLORIDE 50 MG/ML
25 INJECTION, SOLUTION INTRAMUSCULAR; INTRAVENOUS AS NEEDED
Status: CANCELLED
Start: 2021-02-11

## 2021-01-22 RX ORDER — ONDANSETRON 2 MG/ML
8 INJECTION INTRAMUSCULAR; INTRAVENOUS AS NEEDED
Status: CANCELLED | OUTPATIENT
Start: 2021-02-11

## 2021-01-22 RX ORDER — SODIUM CHLORIDE 9 MG/ML
10 INJECTION INTRAMUSCULAR; INTRAVENOUS; SUBCUTANEOUS AS NEEDED
Status: CANCELLED | OUTPATIENT
Start: 2021-02-11

## 2021-01-22 RX ORDER — ALBUTEROL SULFATE 0.83 MG/ML
2.5 SOLUTION RESPIRATORY (INHALATION) AS NEEDED
Status: CANCELLED
Start: 2021-02-18

## 2021-01-22 RX ORDER — EPINEPHRINE 1 MG/ML
0.3 INJECTION, SOLUTION, CONCENTRATE INTRAVENOUS AS NEEDED
Status: CANCELLED | OUTPATIENT
Start: 2021-02-18

## 2021-01-22 RX ORDER — HYDROCORTISONE SODIUM SUCCINATE 100 MG/2ML
100 INJECTION, POWDER, FOR SOLUTION INTRAMUSCULAR; INTRAVENOUS AS NEEDED
Status: CANCELLED | OUTPATIENT
Start: 2021-02-18

## 2021-01-22 RX ORDER — SODIUM CHLORIDE 9 MG/ML
25 INJECTION, SOLUTION INTRAVENOUS CONTINUOUS
Status: CANCELLED | OUTPATIENT
Start: 2021-02-11 | End: 2021-02-11

## 2021-01-22 RX ORDER — HYDROCORTISONE SODIUM SUCCINATE 100 MG/2ML
100 INJECTION, POWDER, FOR SOLUTION INTRAMUSCULAR; INTRAVENOUS AS NEEDED
Status: CANCELLED | OUTPATIENT
Start: 2021-02-11

## 2021-01-22 RX ORDER — SODIUM CHLORIDE 0.9 % (FLUSH) 0.9 %
10 SYRINGE (ML) INJECTION AS NEEDED
Status: DISPENSED | OUTPATIENT
Start: 2021-01-22 | End: 2021-01-22

## 2021-01-22 RX ORDER — ACETAMINOPHEN 325 MG/1
650 TABLET ORAL AS NEEDED
Status: CANCELLED
Start: 2021-02-18

## 2021-01-22 RX ORDER — SODIUM CHLORIDE 9 MG/ML
10 INJECTION INTRAMUSCULAR; INTRAVENOUS; SUBCUTANEOUS AS NEEDED
Status: CANCELLED | OUTPATIENT
Start: 2021-02-18

## 2021-01-22 RX ORDER — SODIUM CHLORIDE 9 MG/ML
25 INJECTION, SOLUTION INTRAVENOUS CONTINUOUS
Status: DISPENSED | OUTPATIENT
Start: 2021-01-22 | End: 2021-01-22

## 2021-01-22 RX ORDER — ALBUTEROL SULFATE 0.83 MG/ML
2.5 SOLUTION RESPIRATORY (INHALATION) AS NEEDED
Status: CANCELLED
Start: 2021-02-11

## 2021-01-22 RX ORDER — PALONOSETRON 0.05 MG/ML
0.25 INJECTION, SOLUTION INTRAVENOUS ONCE
Status: CANCELLED | OUTPATIENT
Start: 2021-02-11 | End: 2021-02-12

## 2021-01-22 RX ADMIN — DEXAMETHASONE SODIUM PHOSPHATE 12 MG: 4 INJECTION, SOLUTION INTRA-ARTICULAR; INTRALESIONAL; INTRAMUSCULAR; INTRAVENOUS; SOFT TISSUE at 11:51

## 2021-01-22 RX ADMIN — GEMCITABINE HYDROCHLORIDE 1448 MG: 1 INJECTION INTRAVENOUS at 13:35

## 2021-01-22 RX ADMIN — PALONOSETRON 0.25 MG: 0.05 INJECTION, SOLUTION INTRAVENOUS at 11:48

## 2021-01-22 RX ADMIN — SODIUM CHLORIDE 150 MG: 900 INJECTION, SOLUTION INTRAVENOUS at 12:10

## 2021-01-22 RX ADMIN — Medication 500 UNITS: at 15:25

## 2021-01-22 RX ADMIN — POTASSIUM CHLORIDE: 2 INJECTION, SOLUTION, CONCENTRATE INTRAVENOUS at 10:40

## 2021-01-22 RX ADMIN — SODIUM CHLORIDE 25 ML/HR: 900 INJECTION, SOLUTION INTRAVENOUS at 11:46

## 2021-01-22 RX ADMIN — CISPLATIN 45.3 MG: 1 INJECTION INTRAVENOUS at 14:10

## 2021-01-22 RX ADMIN — Medication 10 ML: at 15:25

## 2021-01-22 RX ADMIN — PACLITAXEL 181 MG: 100 INJECTION, POWDER, LYOPHILIZED, FOR SUSPENSION INTRAVENOUS at 12:35

## 2021-01-22 NOTE — PROGRESS NOTES
[facility]  [address], [address] [facility] [place], [address] [facility]  [phone]       Follow-up Note      Patient: Chiquita Denis MRN: [id number]  SSN: [id number]    YOB: 1955  Age: 60 y.o. Sex: male        Diagnosis:     1. 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 S/P Pancreatoduodenectomy on  10/06/2017  2. Adjuvant monotherapy with Capecitabine - s/p 6 cycles   (12/4/2017 - 05/2018)  3. SBRT RP node/soft tissue 04/2020  4. Starting palliative Cis/[place]/Abraxane cycle 1 day 1     Subjective:      Chiquita Denis is a 60 y.o. male with a diagnosis of extrahepatic cholangiocarcinoma with metastatic to the lungs, bones and retroperitoneal node/soft tissue. He presented to the ED in August 2017 with obstructive jaundice. He was found to have an obstructive lesion in the dital bile duct. The CT showed marked intrahepatic biliary dilation and common bile duct dilation to the level of the mid common bile duct, which ws markedly narrowed. He underwent a stenting procedure followed by spyglass cholangiogram. The brushings revealed a diagnosis of cholangiocarcinoma. He underwent a Whipple procedure on 10/06/2017. He completed 6 cycles of adjuvant Xeloda. PET scan showed increased activity in the soft tissue along the SMA. CT guided biopsy showed local recurrence. He underwent SBRT by Dr. Dale Hansen in April 2020. He was admitted with severe back pain. Repeat CT shows growth of tumor in the L4/L5, lung and RP node/soft tissue. He underwent a CT guided celiac plexus block which has helped the back pain immensely. A biopsy of the L4 vertebrae confirms a diagnosis of metastatic cholangiocarcinoma.      Mr. Jesu Beltrán is here today to start palliative systemic treatment. He has left lower extremity edema and scrotal edema. He says he has minimal pain in his lower back. He has all of his nausea medications in place and we reviewed how to take them if he has any nausea or vomiting. Review of Systems:    Constitutional: negative  Eyes: negative  Ears, Nose, Mouth, Throat, and Face: negative  Respiratory: negative  Cardiovascular: negative  Gastrointestinal: abdominal pain   Genitourinary:negative  Integument/Breast: negative  Hematologic/Lymphatic: negative  Musculoskeletal: low back pain, left lower extremity edema   Neurological: negative      Past Medical History:   Diagnosis Date    Aneurysm ([address] 2008    CEREBRAL    Arrhythmia     Previous a.fib, ablation, NSR now; NO LONGER FOLLOWED BY CARDIOLOGIST.     Autoimmune disease ([address]     SJOGREN'S    Cancer ([address] 2020    COMMON BILE DUCT, ADENOCARCINOMA, SPINE    Diabetes ([address]     NIDDM    Family history of skin cancer     Hypertension     Sepsis ([address] 2017    STENTING TO BILE DUCT    Status post chemotherapy     Stroke [facility]) 2008    brain aneurysm - no deficits    Sun-damaged skin     Sunburn, blistering      Past Surgical History:   Procedure Laterality Date    HX CHOLECYSTECTOMY      HX GI      COLONOSCOPY, POLYPS (BENIGN)    HX GI  10/06/2017    [facility]     HX GI  2020    WHIPPLE REVISION    HX HEART CATHETERIZATION  2005    Ablation     HX HERNIA REPAIR  12/2020    HERNIA REPAIR    HX ORTHOPAEDIC  2000    Spinal fusion W/ HARDWARE    HX OTHER SURGICAL  11/07/2017    [place] Cath, Dr. Mervin Stock  2017    PORTACATH - then removed    IR KYPHOPLASTY LUMBAR  1/12/2021    NEUROLOGICAL PROCEDURE UNLISTED  2005    Ting hole washout from cerebral hemorrhage    MS ABDOMEN SURGERY PROC UNLISTED  10/2017    APRIL      Family History   Problem Relation Age of Onset    Cancer Father         Breast and Colon, MELANOMA    Hypertension Father     Cancer Mother LEUKEMIA    No Known Problems Sister     Atrial Fibrillation Brother     No Known Problems Sister     No Known Problems Son     No Known Problems Son     Anesth Problems Neg Hx      Social History     Tobacco Use    Smoking status: Never Smoker    Smokeless tobacco: Never Used   Substance Use Topics    Alcohol use: Never     Frequency: Never     Comment: very rare      Prior to Admission medications    Medication Sig Start Date End Date Taking? Authorizing Provider   HYDROmorphone (DILAUDID) 4 mg tablet Take 1.5 Tabs by mouth every three (3) hours as needed for Pain for up to 15 days. Max Daily Amount: 48 mg. 1/20/21 2/4/21 Yes Negrita Brasher MD   oxyCODONE ER (OxyCONTIN) 20 mg ER tablet Take 2 Tabs by mouth every twelve (12) hours for 15 days. Max Daily Amount: 80 mg. 1/20/21 2/4/21 Yes Negrita Brasher MD   pantoprazole (PROTONIX) 40 mg tablet TAKE 1 TABLET BY MOUTH EVERY DAY 1/11/21  Yes Hamzah Okeefe MD   lidocaine-prilocaine (EMLA) topical cream Apply  to affected area as needed for Pain. 30-45 min prior to treatments 1/7/21  Yes Dawit Coulter MD   ondansetron (ZOFRAN ODT) 4 mg disintegrating tablet Take 1 Tab by mouth every eight (8) hours as needed for Nausea or Vomiting. 1/7/21  Yes Dawit Coulter MD   prochlorperazine (Compazine) 10 mg tablet Take 0.5 Tabs by mouth every six (6) hours as needed for Nausea or Vomiting for up to 60 days. 1/7/21 3/8/21 Yes Dawit Coulter MD   dexAMETHasone (DECADRON) 1 mg tablet Take 2 tablets by mouth twice daily for 14 days 12/31/20  Yes Jack Viramontes MD   dutasteride (AVODART) 0.5 mg capsule Take 1 Cap by mouth daily. 12/21/20  Yes Laura Martinez DO   Creon 36,000-114,000- 180,000 unit cpDR capsule Take 4 Caps by mouth three (3) times daily (with meals).  2-3 caps each meal and 1 cap for snacks (see additional order) 12/21/20  Yes Silas Raya III, DO   naloxone (NARCAN) 4 mg/actuation nasal spray Use 1 spray intranasally, then discard. Repeat with new spray every 2 min as needed for opioid overdose symptoms, alternating nostrils. 12/11/20  Yes Mariposa Gardner MD   aluminum & magnesium hydroxide-simethicone (Maalox Maximum Strength) 400-400-40 mg/5 mL suspension Take 10 mL by mouth every six (6) hours as needed for Indigestion. Yes Provider, Historical   vitamin A (AQUASOL A) 10,000 unit capsule Take 1 Cap by mouth daily. 10/26/20  Yes Silas Raya III, DO   vitamin E (AQUA GEMS) 400 unit capsule Take 1 Cap by mouth daily. 10/26/20  Yes Silas Raya III, DO   empagliflozin (Jardiance) 25 mg tablet Take 1 Tab by mouth nightly. 10/20/20  Yes Silas Raya III, DO   gabapentin (NEURONTIN) 100 mg capsule Take 3 capsules by mouth twice a day. 10/20/20  Yes Silas Raya III, DO   cyanocobalamin (VITAMIN B12) 1,000 mcg/mL injection INJECT CONTENTS OF ONE VIAL INTRAMUSCULARLY EVERY OTHER WEEK  Patient taking differently: INJECT CONTENTS OF ONE VIAL INTRAMUSCULARLY EVERY OTHER WEEK (FIRST AND 15TH OF EACH MONTH) 10/20/20  Yes Silas Raya III, DO   tamsulosin (FLOMAX) 0.4 mg capsule TAKE ONE CAPSULE BY MOUTH EVERY EVENING 10/20/20  Yes Silas Raya III, DO   ramipriL (ALTACE) 5 mg capsule Take 1 Cap by mouth daily. Patient taking differently: Take 5 mg by mouth nightly. 10/20/20  Yes Silas Raya III, DO   lidocaine (LIDODERM) 5 % 1 Patch by TransDERmal route every twenty-four (24) hours. Apply patch to the affected area for 12 hours a day and remove for 12 hours a day. Patient taking differently: 1 Patch by TransDERmal route daily as needed. Apply patch to the affected area for 12 hours a day and remove for 12 hours a day. 10/15/20  Yes Silas Raya III, DO   loperamide (IMODIUM) 2 mg capsule Take 2-4 mg by mouth as needed for Diarrhea. Give 4 mg after first loose stool.   Give an additional 2 mg after each loose stool as needed up to a maximum of 8 doses (16 mg/day). Yes Provider, Historical   lipase-protease-amylase (Creon) 36,000-114,000- 180,000 unit cpDR capsule Take 1 Cap by mouth as needed (for snacks). 2-3 caps each meal and 1 cap for snacks (see additional order)   Yes Provider, Historical   finasteride (PROSCAR) 5 mg tablet TAKE 1 TABLET BY MOUTH EVERY DAY 4/13/20  Yes Provider, Historical   acetaminophen (TYLENOL) 325 mg tablet Take 2 Tabs by mouth every four (4) hours as needed for Pain or Fever (Over the counter medication). 1/2/20  Yes Ludmila Rollins NP   sildenafil citrate (VIAGRA) 50 mg tablet Take 1 Tab by mouth as needed (ED). 5/6/19  Yes Silas Raya III, DO   alpha-D-galactosidase (BEANO PO) Take 1 Tab by mouth two (2) times a day. Yes Other, MD Flynn   cetirizine (ZYRTEC) 10 mg tablet Take 10 mg by mouth nightly. Yes Provider, Historical   HYDROmorphone (DILAUDID) 8 mg tablet Take 1 Tab by mouth every three (3) hours as needed for Pain for up to 7 days. Max Daily Amount: 64 mg. 1/22/21 1/29/21  Hamzah Okeefe MD          Allergies   Allergen Reactions    Shellfish Derived Anaphylaxis     Soft shell crabs    Morphine Nausea and Vomiting    Pcn [Penicillins] Other (comments)     Pt stated \"always been told PCN\"  Pt tolerated other cephalosporins in the past           Objective:     Visit Vitals  /65   Pulse 80   Temp 97.5 °F (36.4 °C)   Resp 18   Ht 5' 8\" (1.727 m)   Wt 151 lb 9.6 oz (68.8 kg)   BMI 23.05 kg/m²     Pain Scale: 3/10 - back        Physical Exam:     GENERAL: alert, cooperative, no distress, appears stated age  EYE: negative  LYMPHATIC: Cervical, supraclavicular, and axillary nodes normal.   THROAT & NECK: normal and no erythema or exudates noted.    LUNG: clear to auscultation bilaterally  HEART: regular rate and rhythm  ABDOMEN: soft, non-tender  EXTREMITIES:  left lower extremity edema  SKIN: Normal.  NEUROLOGIC: negative       Physical exam and ROS has been modified from a prior visit to make it relevant and current        CT Results (most recent): FINDINGS:     CHEST WALL: No mass or axillary lymphadenopathy without contrast.  THYROID: No nodule. MEDIASTINUM: No mass or lymphadenopathy without contrast.  ANGEL: No mass or lymphadenopathy without contrast.  THORACIC AORTA: No aneurysm. MAIN PULMONARY ARTERY: Normal in caliber. TRACHEA/BRONCHI: Patent. ESOPHAGUS: No wall thickening or dilatation. HEART: Normal in size. PLEURA: No effusion or pneumothorax. LUNGS: Multiple tiny parenchymal nodules all new since the prior study in May  2020. The largest on the right is probably in the right lower lobe medially  image 53, 5.2 mm. Although there are a number of tiny nodules on the left, the  largest maxillary lie in the left upper lobe image 35, 2.7 mm. Faint patchy  opacity in the right upper lobe image 38 may represent infiltrate. INCIDENTALLY IMAGED UPPER ABDOMEN: Described separately in a CT of the abdomen  contrast-enhanced earlier same day contrast was not administered on this  examination, including postoperative changes in the pancreas with associated  pneumobilia, and stranding of fat in the left paracolic gutter which is stable  since May 2020. Pamalee Bucker BONES: No destructive bone lesion. Incidentally noted old healed rib fractures.     IMPRESSION  IMPRESSION:     1. Numerous tiny pulmonary parenchymal nodules right greater than left,  suspicious for metastatic disease. 2. Focal ill-defined opacity in the right upper lobe which may represent  infiltrate. Follow-up recommended, however. 3. Incidental findings in the upper abdomen described earlier same day. EXAM:  CT abdomen pelvis with contrast     INDICATION: Abdominal pain. History of cholangiocarcinoma.     COMPARISON: CT 9/20/2020. MRI 9/3/2020.     TECHNIQUE: Helical CT of the abdomen  and pelvis  following the uneventful  intravenous administration of nonionic contrast.  Coronal and sagittal reformats  are performed.  CT dose reduction was achieved through use of a standardized  protocol tailored for this examination and automatic exposure control for dose  modulation. Adaptive statistical iterative reconstruction (ASIR) was utilized.     FINDINGS:   The visualized lung bases demonstrate several scattered bilateral peripheral 1  to 2 mm nodules. The heart size is normal. There is no pericardial or pleural  effusion.     Whipple surgical changes are again noted. There is stable pneumobilia. The  gallbladder is surgically absent. The spleen, remainder of the pancreas, and  adrenal glands are normal.      The kidneys are symmetric without hydronephrosis. There is a stable left kidney  cyst.     Poorly defined retroperitoneal soft tissue attenuation surrounding the celiac,  superior mesenteric, and renal arteries measures 3.9 x 5.3 cm (series 3, image  33), previously 4.0 x 4.4 cm. Occlusion of the left renal vein is again seen  (series 3, image 34). Collateral vessels in the central upper abdomen are again  noted.     There are no dilated bowel loops.  The appendix is normal.       There are no enlarged lymph nodes.  There is no free fluid or free air. The  aorta tapers without aneurysm.     The urinary bladder is normal.  There is no pelvic mass. The prostate is not  enlarged.     There is a round sclerotic bony lesion in the L5 vertebral body measuring 1.2 cm  (series 602, image 66). Posterior L5-S1 fusion hardware is noted.     IMPRESSION  IMPRESSION:   1. Surgical changes are again seen following Whipple procedure. Overall mildly  increased perivascular recurrence in the superior retroperitoneum. Left renal  vein occlusion and upper abdominal collateral vessels are again noted.      2. Round sclerotic lesion in the L5 vertebral body suspicious for metastasis.     3. Several scattered bilateral peripheral lower lung nodules measuring 1 to 2 mm  may represent infection, inflammation, or metastasis.      I personally reviewed the images. Para aortic  mass. L5 vertebral body metastasis and lung nodules. Lab Results   Component Value Date/Time    WBC 4.9 01/22/2021 09:17 AM    Hemoglobin (POC) 10.3 (L) 10/06/2017 01:14 PM    HGB 11.7 (L) 01/22/2021 09:17 AM    HCT 36.5 (L) 01/22/2021 09:17 AM    PLATELET 218 (L) 01/26/0143 09:17 AM    MCV 92.6 01/22/2021 09:17 AM       Lab Results   Component Value Date/Time    Sodium 136 01/22/2021 09:17 AM    Potassium 3.8 01/22/2021 09:17 AM    Chloride 103 01/22/2021 09:17 AM    CO2 28 01/22/2021 09:17 AM    Anion gap 5 01/22/2021 09:17 AM    Glucose 172 (H) 01/22/2021 09:17 AM    BUN 15 01/22/2021 09:17 AM    Creatinine 0.81 01/22/2021 09:17 AM    BUN/Creatinine ratio 19 01/22/2021 09:17 AM    GFR est AA >60 01/22/2021 09:17 AM    GFR est non-AA >60 01/22/2021 09:17 AM    Calcium 8.4 (L) 01/22/2021 09:17 AM    Bilirubin, total 0.6 01/22/2021 09:17 AM    Alk. phosphatase 262 (H) 01/22/2021 09:17 AM    Protein, total 5.8 (L) 01/22/2021 09:17 AM    Albumin 3.1 (L) 01/22/2021 09:17 AM    Globulin 2.7 01/22/2021 09:17 AM    A-G Ratio 1.1 01/22/2021 09:17 AM    ALT (SGPT) 48 01/22/2021 09:17 AM    AST (SGOT) 11 (L) 01/22/2021 09:17 AM           Assessment:     1. 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 Local recurrence in the para-aortic soft tissue - S/P SBRT     Recent CT shows progression of disease in the retroperitoneum, L4, lungs  The disease is unresectable. Treatment will in a palliative intent with a goal to extend life.    I will obtain biomarker on the tissue  I proposed systemic therapy with Cis/[place]/Abraxane    Starting palliative Cis/[place]/Abraxane cycle 1 day 1  I educated him about the side effects of the medicine and ways to manage it. He vocalized understanding. Blood counts are acceptable. Results reviewed with the patient. 2. Cancer related pain    S/P celiac plexus block - done by Dr. Hans Nath with significant improvement in the pain      3. Bone metastasis, L4    Nuclear medicine bone scan  Ablation/Kyphoplasty - Dr. Red Mass:     > Starting Cis/[place]/Abraxane  > Continue to follow with palliative for symptom management  > Consider Denosumab vs Zoledronic acid.   > Follow up in 1 week       I performed a history and physical examination of the patient and discussed his management with the NPP. I reviewed the NPP note and agree with the documented findings and plan of care. The patient was seen in conjunction with Ms. Janice. Signed by: Lorrie Abarca MD                     January 22, 2021      CC. Sneha Barahona MD  CC. Nancy Crespo MD  CC. Elisabeth Barnett MD  CC. Dana Chino MD  CC.  David Layne MD

## 2021-01-22 NOTE — PROGRESS NOTES
[facility] - Chemotherapy Progress Note    0800- Pt admit to [facility] for Abraxane/Gemzar/Cisplatin in stable condition. Assessment completed, patient reporting some back pain and pain from hernia site on abdomen. Patient denied having any symptoms of COVID-19, i.e. SOB, coughing, fever, or generally not feeling well. Also denies having been exposed to COVID-19 recently or having had any recent contact with family/friend that has a pending COVID test.     Right chest port accessed with 0.75inch cagle needle and with positive blood return. Labs drawn per order and sent. Line flushed, clamped, Curos Cap applied to end clave. No flowsheet data found. Patient BP at completion was slightly hypotensive, patient remained asymptomatic. Patient Vitals for the past 12 hrs:   Temp Pulse Resp BP   01/22/21 1524  77  (!) 99/59   01/22/21 1519  70 18 (!) 93/48   01/22/21 0902 97.5 °F (36.4 °C) 80 18 107/65        Recent Results (from the past 12 hour(s))   CBC WITH AUTOMATED DIFF    Collection Time: 01/22/21  9:17 AM   Result Value Ref Range    WBC 4.9 4.1 - 11.1 K/uL    RBC 3.94 (L) 4.10 - 5.70 M/uL    HGB 11.7 (L) 12.1 - 17.0 g/dL    HCT 36.5 (L) 36.6 - 50.3 %    MCV 92.6 80.0 - 99.0 FL    MCH 29.7 26.0 - 34.0 PG    MCHC 32.1 30.0 - 36.5 g/dL    RDW 13.0 11.5 - 14.5 %    PLATELET 074 (L) 143 - 400 K/uL    MPV 10.5 8.9 - 12.9 FL    NRBC 0.0 0  WBC    ABSOLUTE NRBC 0.00 0.00 - 0.01 K/uL    NEUTROPHILS 71 32 - 75 %    LYMPHOCYTES 13 12 - 49 %    MONOCYTES 9 5 - 13 %    EOSINOPHILS 5 0 - 7 %    BASOPHILS 1 0 - 1 %    IMMATURE GRANULOCYTES 1 (H) 0.0 - 0.5 %    ABS. NEUTROPHILS 3.5 1.8 - 8.0 K/UL    ABS. LYMPHOCYTES 0.6 (L) 0.8 - 3.5 K/UL    ABS. MONOCYTES 0.4 0.0 - 1.0 K/UL    ABS. EOSINOPHILS 0.2 0.0 - 0.4 K/UL    ABS. BASOPHILS 0.1 0.0 - 0.1 K/UL    ABS. IMM.  GRANS. 0.1 (H) 0.00 - 0.04 K/UL    DF SMEAR SCANNED      RBC COMMENTS NORMOCYTIC, NORMOCHROMIC     METABOLIC PANEL, COMPREHENSIVE Collection Time: 01/22/21  9:17 AM   Result Value Ref Range    Sodium 136 136 - 145 mmol/L    Potassium 3.8 3.5 - 5.1 mmol/L    Chloride 103 97 - 108 mmol/L    CO2 28 21 - 32 mmol/L    Anion gap 5 5 - 15 mmol/L    Glucose 172 (H) 65 - 100 mg/dL    BUN 15 6 - 20 MG/DL    Creatinine 0.81 0.70 - 1.30 MG/DL    BUN/Creatinine ratio 19 12 - 20      GFR est AA >60 >60 ml/min/1.73m2    GFR est non-AA >60 >60 ml/min/1.73m2    Calcium 8.4 (L) 8.5 - 10.1 MG/DL    Bilirubin, total 0.6 0.2 - 1.0 MG/DL    ALT (SGPT) 48 12 - 78 U/L    AST (SGOT) 11 (L) 15 - 37 U/L    Alk.  phosphatase 262 (H) 45 - 117 U/L    Protein, total 5.8 (L) 6.4 - 8.2 g/dL    Albumin 3.1 (L) 3.5 - 5.0 g/dL    Globulin 2.7 2.0 - 4.0 g/dL    A-G Ratio 1.1 1.1 - 2.2          Medications:  Medications Administered     0.9% sodium chloride 1,000 mL with potassium chloride 10 mEq, magnesium sulfate 2 g infusion     Admin Date  01/22/2021 Action  Given Dose   Rate  1,000 mL/hr Route  IntraVENous Administered By  Beth Alicia RN          0.9% sodium chloride infusion     Admin Date  01/22/2021 Action  New Bag Dose  25 mL/hr Rate  25 mL/hr Route  IntraVENous Administered By  Maddison Jimenez          CISplatin (PLATINOL) 45.3 mg in 0.9% sodium chloride 250 mL, overfill volume 25 mL chemo infusion     Admin Date  01/22/2021 Action  New Bag Dose  45.3 mg Rate  320.3 mL/hr Route  IntraVENous Administered By  Alcira Taylor RN          dexamethasone (DECADRON) 12 mg in 0.9% sodium chloride 50 mL IVPB     Admin Date  01/22/2021 Action  Given Dose  12 mg Route  IntraVENous Administered By  Maddison Jimenez          fosaprepitant (EMEND) 150 mg in 0.9% sodium chloride 150 mL IVPB     Admin Date  01/22/2021 Action  Given Dose  150 mg Rate  450 mL/hr Route  IntraVENous Administered By  Beth Alicia, ESVIN          gemcitabine (GEMZAR) 1,448 mg in 0.9% sodium chloride 250 mL, overfill volume 25 mL chemo infusion     Admin Date  01/22/2021 Action  New Bag Dose  1,448 mg Rate  626.2 mL/hr Route  IntraVENous Administered By  Ildefonso Blum RN          heparin (porcine) pf 300-500 Units     Admin Date  01/22/2021 Action  Given Dose  500 Units Route  InterCATHeter Administered By  Ildefonso Blum RN          PACLitaxel-protein bound (ABRAXANE) 181 mg in 0.9% sodium chloride 36.2 mL chemo infusion     Admin Date  01/22/2021 Action  New Bag Dose  181 mg Rate  72.4 mL/hr Route  IntraVENous Administered By  Ildefonso Blum RN          palonosetron HCl (ALOXI) injection 0.25 mg     Admin Date  01/22/2021 Action  Given Dose  0.25 mg Route  IntraVENous Administered By  Nimanav Dykes          sodium chloride (NS) flush 10 mL     Admin Date  01/22/2021 Action  Given Dose  10 mL Route  IntraVENous Administered By  Ildefonso Blum RN                 Pt tolerated treatment well. Port maintained positive blood return throughout treatment, flushed with positive blood return at conclusion, and de-accessed. 1530- D/c home in no distress.  Pt aware of next \[facility] [place] [facility]\"" appointment scheduled for:    Future Appointments   Date Time Provider Sloane Roach   1/26/2021  8:20 AM Julio Hines NP [facility] BS AMB   1/27/2021  2:30 PM Governor Srinivasa MD [facility] BS AMB   1/29/2021  9:00 AM CHAIR 2 [address] REG   1/29/2021  9:15 AM Wing Diez NP [facility] BS AMB   2/11/2021  9:00 AM CHAIR 2 [address] REG   2/18/2021 10:00 AM CHAIR 2 [address]

## 2021-01-22 NOTE — LETTER
1/22/2021 Patient: Eleazar Davenport. YOB: 1955 Date of Visit: 1/22/2021 Professor Carine Lester DO 
[address] [facility] [address] [id number] [address] [employer] Dear Professor Carine Lester DO, Thank you for referring Mr. Nura Granados to [address] [place] [address] for evaluation. My notes for this consultation are attached. If you have questions, please do not hesitate to call me. I look forward to following your patient along with you.  
 
 
Sincerely, 
 
Waqas Han NP

## 2021-01-22 NOTE — TELEPHONE ENCOUNTER
Patients pharmacy will not have Dilaudid 4 mg tabs in until next week Monday , Dilaudid 8 mg reordered for one week to cover

## 2021-01-26 ENCOUNTER — VIRTUAL VISIT (OUTPATIENT)
Dept: SURGERY | Age: 66
End: 2021-01-26
Payer: COMMERCIAL

## 2021-01-26 VITALS
WEIGHT: 152 LBS | HEIGHT: 68 IN | BODY MASS INDEX: 23.04 KG/M2 | RESPIRATION RATE: 20 BRPM | HEART RATE: 89 BPM | DIASTOLIC BLOOD PRESSURE: 69 MMHG | SYSTOLIC BLOOD PRESSURE: 117 MMHG

## 2021-01-26 DIAGNOSIS — Z09 POSTOPERATIVE EXAMINATION: Primary | ICD-10-CM

## 2021-01-26 PROCEDURE — 99024 POSTOP FOLLOW-UP VISIT: CPT | Performed by: NURSE PRACTITIONER

## 2021-01-26 NOTE — PROGRESS NOTES
Subjective:    I was at home while conducting this encounter. Consent:  He and/or his healthcare decision maker is aware that this patient-initiated Telehealth encounter is a billable service, with coverage as determined by his insurance carrier. He is aware that he may receive a bill and has provided verbal consent to proceed: Yes    This virtual visit was conducted via [employer]. Pursuant to the emergency declaration under the [facility] waiver authority and the [employer] Act, this Virtual  Visit was conducted to reduce the patient's risk of exposure to COVID-19 and provide continuity of care for an established patient. Services were provided through a video synchronous discussion virtually to substitute for in-person clinic visit. Due to this being a TeleHealth evaluation, many elements of the physical examination are unable to be assessed. Total Time: minutes: 5-10 minutes. Mary Guerrero is a 60 y.o. male presents for postop care following right IJ Port-A-Cath placement by Dr. Medhat Proctor. 2 weeks ago. No complaints. Denies nausea vomiting. Patient is seeing Dr. William Marin.  He started treatment Friday. Mr. Queta Tejada has a reminder for a \"due or due soon\" health maintenance. I have asked that he contact his primary care provider for follow-up on this health maintenance. Objective:     Visit Vitals  /69   Pulse 89   Resp 20   Ht 5' 8\" (1.727 m)   Wt 152 lb (68.9 kg)   BMI 23.11 kg/m²       General:  alert, cooperative       Incision:   healing well-Per patient. Unable to visualize due to screen pixellating. Assessment:     Doing well postoperatively. Plan:     1. Follow-up as needed. 2.  Continue follow-up with oncology. Pt verbalized understanding and questions were answered to the best of my knowledge and ability.

## 2021-01-26 NOTE — PATIENT INSTRUCTIONS
Implanted Port: What to Expect at [facility] Your Recovery You have had a procedure to implant a port. A port is a device placed, in most cases, under the skin of your chest below your collarbone. It is made of plastic, stainless steel, or titanium. It's about the size of a quarter, but thicker. It looks like a small bump under your skin. A thin, flexible tube called a catheter runs under the skin from the port into a large vein. With the port, you will be able to get medicines (such as chemotherapy) with more comfort. You also can get blood, nutrients, or other fluids. Blood can be taken through the port for tests. You will probably have some discomfort and bruising at the port site. This will go away in a few days. The port can be used right away. You may have the port for weeks, months, or longer. Your port will need to be flushed out regularly to keep it open. A nurse or other health professional will do this for you. This care sheet gives you a general idea about how long it will take for you to recover. But each person recovers at a different pace. Follow the steps below to feel better as quickly as possible. How can you care for yourself at home? Activity 
  · Avoid arm and upper body movements that may pull on the catheter for the first few days. These movements include heavy weight lifting and vigorous use of your arms.  
  · You will probably need to take 1 day off from work and will be able to return to normal activities shortly after. This depends on the type of work you do, why you have the catheter, and how you feel.  
  · You probably will be able to take baths and swim. But you may need to avoid some activities. Talk to your doctor about any limits on your activity.  
  · Ask your doctor when you can drive again. Be careful when you pull your seat belt across your chest so it doesn't pull out the catheter. It's okay if the seat belt lays over the catheter. Medicines   · Your doctor will tell you if and when you can restart your medicines. He or she will also give you instructions about taking any new medicines.  
  · If you take aspirin or some other blood thinner, ask your doctor if and when to start taking it again. Make sure that you understand exactly what your doctor wants you to do.  
  · Be safe with medicines. Read and follow all instructions on the label. ? If the doctor gave you a prescription medicine for pain, take it as prescribed. ? If you are not taking a prescription pain medicine, ask your doctor if you can take an over-the-counter medicine. Incision care 
  · If you have a bandage, your doctor will tell you when you can remove it. After you remove the bandage, you may shower. Wash the area with soap and water and pat it dry. Don't use hydrogen peroxide or alcohol, which can slow healing. You may cover the area with a gauze bandage if it weeps or rubs against clothing. Change the bandage every day.  
  · If you have strips of tape on the cut (incision) the doctor made, leave the tape on for a week or until it falls off. Other instructions 
  · Always carry the medical alert card that your doctor gives you. It contains information about your port. It will tell health care workers that you have a port in case you need emergency care.  
  · When you get dressed, be careful not to rub the port. Do not wear a bra or suspenders that irritate your skin near the port. Follow-up care is a key part of your treatment and safety. Be sure to make and go to all appointments, and call your doctor if you are having problems. It's also a good idea to know your test results and keep a list of the medicines you take. When should you call for help? Call your doctor now or seek immediate medical care if: 
  · You have signs of infection, such as: 
? Increased pain, swelling, warmth, or redness. ? Red streaks leading from the area. ? Pus draining from the area. ? A fever.  
  · You have pain or swelling in your neck or arm.  
  · You have trouble breathing. Watch closely for changes in your health, and be sure to contact your doctor if: 
  · You have any problems with your line or port. Where can you learn more? Go to http://www.gray.com/ Enter M256 in the search box to learn more about \"Implanted Port: What to Expect at Home. \" Current as of: June 26, 2019               Content Version: 12.6 © 2561-3874 [employer]. Care instructions adapted under license by [employer] (which disclaims liability or warranty for this information). If you have questions about a medical condition or this instruction, always ask your healthcare professional. [address] any warranty or liability for your use of this information.

## 2021-01-27 ENCOUNTER — VIRTUAL VISIT (OUTPATIENT)
Dept: PALLATIVE CARE | Age: 66
End: 2021-01-27
Payer: COMMERCIAL

## 2021-01-27 DIAGNOSIS — C22.1 CHOLANGIOCARCINOMA OF BILIARY TRACT (HCC): Primary | ICD-10-CM

## 2021-01-27 DIAGNOSIS — M54.59 INTRACTABLE LOW BACK PAIN: ICD-10-CM

## 2021-01-27 DIAGNOSIS — R10.9 INTRACTABLE ABDOMINAL PAIN: ICD-10-CM

## 2021-01-27 DIAGNOSIS — K59.1 FUNCTIONAL DIARRHEA: ICD-10-CM

## 2021-01-27 PROCEDURE — 99214 OFFICE O/P EST MOD 30 MIN: CPT | Performed by: INTERNAL MEDICINE

## 2021-01-27 RX ORDER — HYDROMORPHONE HYDROCHLORIDE 4 MG/1
8 TABLET ORAL
Qty: 180 TAB | Refills: 0 | Status: SHIPPED | OUTPATIENT
Start: 2021-01-27 | End: 2021-02-24 | Stop reason: SDUPTHER

## 2021-01-27 NOTE — PROGRESS NOTES
Palliative Medicine Outpatient Services  Jesus: [phone] ([phone])    Patient Name: Armando Man YOB: 1955    Date of Current Visit: 01/27/21  Location of Current Visit:    [] [facility] Office  [] [facility] Office  [] [address] [place] [address]  [] Home  [x] Other: Virtual synchronous A/V visit    Date of Initial Visit: 4/6/2020  Referral from: Dr. Marlyne Boas  Primary Care Physician: Ricky Duggan DO      SUMMARY:   Armando Man is a 60y.o. 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 Marlyne Boas for management of symptoms and psychosocial support. 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 This is second marriage for both of them. Current treatment-completed RT to the para-aortic mass, pursuing diagnostics with GI physician Dr. Marlon Mondragon for gastroparesis and pancreatic enzymes, has had biliary stents replaced. Status post celiac plexus block and reversal of Whipple procedure on 9/9/2020    Hospitalization at [facility] for uncontrolled pain in December 2020    L5 biopsy, ablation and kyphoplasty on 1/12/2021    Current treatment-on cisplatin plus gemcitabine plus Abraxane       PALLIATIVE DIAGNOSES:       ICD-10-CM ICD-9-CM    1. Cholangiocarcinoma of biliary tract (HCC)  C22.1 155.1 HYDROmorphone (DILAUDID) 4 mg tablet   2. Intractable abdominal pain  R10.9 789.00    3. Intractable low back pain  M54.5 724.2    4.  Functional diarrhea  K59.1 564.5           PLAN:   Patient Instructions Dear Halle Pope. ,    It was a pleasure seeing you today in your home along with your wife virtually    We will see you again in 4 weeks    If labs or imaging tests have been ordered for you today, please call the office  at [phone] 48 hours after completion to obtain the results. Your stated goal:   - better pain management    Your described symptoms were: Fatigue: 0 Drowsiness: 6   Depression: 0 Pain: 6   Anxiety: 0 Nausea: 2   Anorexia: 2 Dyspnea: 0   Best Well-Bein Constipation: No   Other Problem (Comment): 0       This is the plan we talked about:    1. Acute low back pain from new L5 met status post ablation and kyphoplasty  -Your acute worsening of back pain after the L5 biopsy has improved somewhat and you are no longer taking Dilaudid 8 mg every 3 hours. But he still requiring Dilaudid 8 mg every 4-6 hours. We talked about providing you with scripts for 4 mg Dilaudid tablets so you can take 1 or 1.5 or 2 tablets based on your pain level. I am providing you with Dilaudid 4 mg tablets, take 2 tablets every 4 hours as needed in order to allow enough number of tablets for you to titrate on your own. Please fill this prescription after you have completed current supply of 8 mg tablets.  -Continue OxyContin 40 mg 2 times a day. 2.  Opioid-induced constipation  -You usually have functional diarrhea but now with increasing opioid use, you are struggling with constipation. Please have 1 mg 1 prune-juice every morning, you are unable to eat a high-fiber diet due to the reversal of Whipple procedure. You can use MiraLAX every day along with 1 stool softener pill. Please stop both once you have diarrhea. 3.  Renal colic  -Thankfully, this is resolved. The urologist signed off on you. 4. Functional diarrhea  -This is better since starting Creon    5. Sjogren's disease  -You have a tendency to get dehydrated very quickly.   We talked about this in detail today including how to be proactive and drink more than a liter of water per day, have Gatorade and lemonade at home. Once you start chemotherapy, if you are not able to keep up with your oral fluid intake, please let us know so we can arrange for you to receive IV fluids in the infusion center or even at home if your insurance allows. We talked about the use of dispatch health which is more for urgent care needs only and not for nonurgent needs. It is better to call us so we can arrange the care that you need proactively. 6.  Multivitamin deficiency  -You were found to have severe vitamin A and vitamin D deficiency. You are on replacement therapy now. Please discuss with your primary care doctor about rechecking the levels and continuing prescription level replacement. 7. We completed an AMD naming your wife as Nikko. 8.  Disease progression  -We reviewed your scans and plan for chemotherapy in detail. You are starting chemotherapy this Friday. We talked about potential side effects including nausea, vomiting, diarrhea and dehydration along with fatigue. Given your Sjogren's history, you are at high risk for further dehydration and cramping. We talked about how to stay proactive about this. You do have Zofran and Compazine at home.           This is what you have shared with us about Advance Care Planning:      Primary Decision Maker: Irma Tomas Spouse - [phone]    Secondary Decision Maker: Ridge Cristina III - Child - [phone]    Supplemental (Other) Decision Maker: Chintan Press Child - [phone]  Advance Care Planning 1/27/2021   Patient's [address] is: Named in scanned ACP document   Primary Decision Maker Name -   Primary Decision Maker Phone Number -   Primary Decision Maker Relationship to Patient -   Confirm Advance Directive Yes, on file   Patient Would Like to Complete Advance Directive -   Does the patient have other document types -           The Palliative Medicine Team is here to support you and your family. Sincerely,      Yari Johansen MD and the Palliative Medicine Team       GOALS OF CARE / TREATMENT PREFERENCES:   [====Goals of Care====]  GOALS OF CARE:  Patient / health care proxy stated goals: See Patient Instructions / Summary    TREATMENT PREFERENCES:   Code Status:  [x] Attempt Resuscitation       [] Do Not Attempt Resuscitation    Advance Care Planning:  [x] The [email or web address] Interdisciplinary Team has updated the ACP Navigator with Decision Maker and Patient Capacity      Primary Decision MakerYazan Sue - [phone]    Secondary Decision Maker: Ridge Cristina III - Child - [phone]    Supplemental (Other) Decision Maker: Amaury Mcarthur Child - [phone]  Advance Care Planning 1/27/2021   Patient's Healthcare Decision Maker is: Named in scanned ACP document   Primary Decision Maker Name -   Primary Decision [address] Phone Number -   Primary Decision Maker Relationship to Patient -   Confirm Advance Directive Yes, on file   Patient Would Like to Complete Advance Directive -   Does the patient have other document types -       Other:  (If patient appropriate for POST, consider using PALLPOST smart phrase here)    The palliative care team has discussed with patient / health care proxy about goals of care / treatment preferences for patient.  [====Goals of Care====]     PRESCRIPTIONS GIVEN:     Medications Ordered Today   Medications    HYDROmorphone (DILAUDID) 4 mg tablet     Sig: Take 2 Tabs by mouth every four (4) hours as needed for Pain for up to 15 days. Max Daily Amount: 48 mg.      Dispense:  180 Tab     Refill:  0           FOLLOW UP:     Future Appointments   Date Time Provider Sloane Roach   1/27/2021  2:30 PM Yari Johansen MD [facility] BS AMB   1/29/2021  9:00 AM CHAIR 2 [address] REG   1/29/2021  9:15 AM NIKI Mays BS AMB   2/11/2021  9:00 AM CHAIR 2 [facility] REG   2/18/2021 10:00 AM CHAIR 2 [address] [facility]           PHYSICIANS INVOLVED IN CARE:   Patient Care Team:  Anny Calhoun,  as PCP - General (Internal Medicine)  Anny Calhoun DO as PCP - [facility] Luis Juan Empaneled Provider  Nereida Leon MD (General Surgery)  Jose Parra MD (Gastroenterology)  Yasmine Aguirre MD (Gastroenterology)  Polina Starks MD (Hematology and Oncology)  Sheldon Palafox MD as Palliative Care (Palliative Medicine)  Shelly Orellana RN as Benefits Care Manager       HISTORY:   Reviewed patient-completed ESAS and advance care planning form. Reviewed patient record in prescription monitoring program.    CHIEF COMPLAINT: No chief complaint on file. HPI/SUBJECTIVE:    The patient is: [x] Verbal / [] Nonverbal     Patient doing a bit better than previous visit. His acute increase in pain after the biopsy is improved and he is no longer needing 8 mg every 3 hours. But he still needing it every 4-5 hours on a regular basis. He tried taking half a tablet once or twice but it did not alleviate his pain so he ended up taking the other half right away. He is willing to try cutting back on his Dilaudid in the future but right now the plan is to stay at 8 mg every 4-5 hours. He is taking stool softeners as needed for constipation, the combination of diarrhea from his pancreatitis/Whipple reversal and staying on high-dose opioids is keeping him on a regular bowel movement schedule. He is worried about dehydration and muscle cramping given his history of Sjogren's disease and initiation of chemotherapy.  -------    Patient here with his wife. Both are very good historians. Mid upper back pain-much improved since radiation to the periaortic mass. He struggled with pain for several months before it was identified as cancer recurrence. He was started on fentanyl 25 mcg patch which he wants to wean off of.     He has made upper and mid zone abdominal pain which comes and goes.  He has not noticed any specific pattern to the pain but usually the pain comes on before vomiting or relieves without vomiting. Sometimes, he vomits food that he ate about 12 to 14 hours ago. No constipation. He has 2 liquid bowel movements every day. He is unable to tolerate eggs or honey. He is getting tests done to evaluate his pancreatic enzymes. He has very good support through his wife. Clinical Pain Assessment (nonverbal scale for nonverbal patients):   [++++ Clinical Pain Assessment++++]  [++++Pain Severity++++]: Pain: 6  [++++Pain Character++++]: cramping  [++++Pain Duration++++]: months  [++++Pain Effect++++]: functional   [++++Pain Factors++++]: none in particular  [++++Pain Frequency++++]: on and off  [++++Pain Location++++]: upper abdomen and mid back  [++++ Clinical Pain Assessment++++]       FUNCTIONAL ASSESSMENT:     Palliative Performance Scale (PPS):          PSYCHOSOCIAL/SPIRITUAL SCREENING:     Any spiritual / Baptism concerns:  [] Yes /  [x] No    Caregiver Burnout:  [] Yes /  [x] No /  [] No Caregiver Present      Anticipatory grief assessment:   [x] Normal  / [] Maladaptive       ESAS Anxiety: Anxiety: 0    ESAS Depression: Depression: 0       REVIEW OF SYSTEMS:     The following systems were [x] reviewed / [] unable to be reviewed  Systems: constitutional, ears/nose/mouth/throat, respiratory, gastrointestinal, genitourinary, musculoskeletal, integumentary, neurologic, psychiatric, endocrine. Positive findings noted below. Modified ESAS Completed by: provider   Fatigue: 0 Drowsiness: 6   Depression: 0 Pain: 6   Anxiety: 0 Nausea: 2   Anorexia: 2 Dyspnea: 0   Best Well-Bein Constipation: No   Other Problem (Comment): 0          PHYSICAL EXAM:     Wt Readings from Last 3 Encounters:   21 152 lb (68.9 kg)   21 151 lb 6.4 oz (68.7 kg)   21 151 lb 9.6 oz (68.8 kg)     There were no vitals taken for this visit.   Last bowel movement: See Nursing Note    Constitutional    [x] Appears well-developed and well-nourished in no apparent distress    [] Abnormal:  Mental status  [x] Alert and awake  [x] Oriented to person/place/time  [x] Able to follow commands  [] Abnormal:   Eyes  [x] EOM normal   [x] Sclera normal   [x] No visible ocular discharge  [] Abnormal:   HENT  [x] Normocephalic, atraumatic  [x] Mouth/Throat: Moist mucous membranes   [x] External Ears normal  [] Abnormal:  Neck  [x] No visualized mass  [] Abnormal:  Pulmonary/Chest   [x] Respiratory effort normal  [x] No visualized signs of difficulty breathing or respiratory distress  [] Abnormal:  Musculoskeletal  [x] Normal gait with no signs of ataxia  [x] Normal range of motion of neck  [] Abnormal:  Neurological:   [x] No facial asymmetry (Cranial nerve 7 motor function)  [x] No gaze palsy  [] Abnormal:   Skin  [x] No significant exanthematous lesions or discoloration noted on facial skin  [] Abnormal:                                  Psychiatric  [x] Normal affect  [x] No hallucinations  [] Abnormal:    Other pertinent observable physical exam findings:    Due to this being a TeleHealth evaluation, many elements of the physical examination are unable to be assessed. HISTORY:     Past Medical History:   Diagnosis Date    Aneurysm ([facility] 2008    CEREBRAL    Arrhythmia     Previous a.fib, ablation, NSR now; NO LONGER FOLLOWED BY CARDIOLOGIST.     Autoimmune disease ([address]     SJOGREN'S    Cancer ([facility] [address] 2020    COMMON BILE DUCT, ADENOCARCINOMA, SPINE    Diabetes ([address]     NIDDM    Family history of skin cancer     Hypertension     Sepsis ([address] 2017    STENTING TO BILE DUCT    Status post chemotherapy     Stroke [facility]) 2008    brain aneurysm - no deficits    Sun-damaged skin     Sunburn, blistering       Past Surgical History:   Procedure Laterality Date    HX CHOLECYSTECTOMY      HX GI      COLONOSCOPY, POLYPS (BENIGN)    [facility]     HX GI 2020    IPP REVISION    HX HEART CATHETERIZATION  2005    Ablation     HX HERNIA REPAIR  12/2020    HERNIA REPAIR    HX ORTHOPAEDIC  2000    Spinal fusion W/ HARDWARE    HX OTHER SURGICAL  11/07/2017    [place] [facility], Dr. Francheska Alicia HX OTHER SURGICAL  01/11/2021    RIGHT IJ PORT-A-CATH PLACEMENT     HX VASCULAR ACCESS  2017    PORTACATH - then removed    IR KYPHOPLASTY LUMBAR  1/12/2021    NEUROLOGICAL PROCEDURE UNLISTED  2005    [place] hole washout from cerebral hemorrhage    [place]  10[place]    [place]      Family History   Problem Relation Age of Onset    Cancer Father         Breast and Colon, MELANOMA    Hypertension Father     Cancer Mother         LEUKEMIA    No Known Problems Sister     Atrial Fibrillation Brother     No Known Problems Sister     No Known Problems Son     No Known Problems Son     Anesth Problems Neg Hx       History reviewed, no pertinent family history. Social History     Tobacco Use    Smoking status: Never Smoker    Smokeless tobacco: Never Used   Substance Use Topics    Alcohol use: Never     Frequency: Never     Comment: very rare     Allergies   Allergen Reactions    Shellfish Derived Anaphylaxis     Soft shell crabs    Morphine Nausea and Vomiting    Pcn [Penicillins] Other (comments)     Pt stated \"always been told PCN\"  Pt tolerated other cephalosporins in the past      Current Outpatient Medications   Medication Sig    HYDROmorphone (DILAUDID) 4 mg tablet Take 2 Tabs by mouth every four (4) hours as needed for Pain for up to 15 days. Max Daily Amount: 48 mg.    HYDROmorphone (DILAUDID) 8 mg tablet Take 1 Tab by mouth every three (3) hours as needed for Pain for up to 7 days. Max Daily Amount: 64 mg.    HYDROmorphone (DILAUDID) 4 mg tablet Take 1.5 Tabs by mouth every three (3) hours as needed for Pain for up to 15 days.  Max Daily Amount: 48 mg.    oxyCODONE ER (OxyCONTIN) 20 mg ER tablet Take 2 Tabs by mouth every twelve (12) hours for 15 days. Max Daily Amount: 80 mg.  pantoprazole (PROTONIX) 40 mg tablet TAKE 1 TABLET BY MOUTH EVERY DAY    lidocaine-prilocaine (EMLA) topical cream Apply  to affected area as needed for Pain. 30-45 min prior to treatments    ondansetron (ZOFRAN ODT) 4 mg disintegrating tablet Take 1 Tab by mouth every eight (8) hours as needed for Nausea or Vomiting.  prochlorperazine (Compazine) 10 mg tablet Take 0.5 Tabs by mouth every six (6) hours as needed for Nausea or Vomiting for up to 60 days.  dexAMETHasone (DECADRON) 1 mg tablet Take 2 tablets by mouth twice daily for 14 days    dutasteride (AVODART) 0.5 mg capsule Take 1 Cap by mouth daily.  Creon 36,000-114,000- 180,000 unit cpDR capsule Take 4 Caps by mouth three (3) times daily (with meals). 2-3 caps each meal and 1 cap for snacks (see additional order)    naloxone (NARCAN) 4 mg/actuation nasal spray Use 1 spray intranasally, then discard. Repeat with new spray every 2 min as needed for opioid overdose symptoms, alternating nostrils.  aluminum & magnesium hydroxide-simethicone (Maalox Maximum Strength) 400-400-40 mg/5 mL suspension Take 10 mL by mouth every six (6) hours as needed for Indigestion.  vitamin A (AQUASOL A) 10,000 unit capsule Take 1 Cap by mouth daily.  vitamin E (AQUA GEMS) 400 unit capsule Take 1 Cap by mouth daily.  empagliflozin (Jardiance) 25 mg tablet Take 1 Tab by mouth nightly.  gabapentin (NEURONTIN) 100 mg capsule Take 3 capsules by mouth twice a day.  cyanocobalamin (VITAMIN B12) 1,000 mcg/mL injection INJECT CONTENTS OF ONE VIAL INTRAMUSCULARLY EVERY OTHER WEEK (Patient taking differently: INJECT CONTENTS OF ONE VIAL INTRAMUSCULARLY EVERY OTHER WEEK (FIRST AND 15TH OF EACH MONTH))    tamsulosin (FLOMAX) 0.4 mg capsule TAKE ONE CAPSULE BY MOUTH EVERY EVENING    ramipriL (ALTACE) 5 mg capsule Take 1 Cap by mouth daily.  (Patient taking differently: Take 5 mg by mouth nightly.)    lidocaine (LIDODERM) 5 % 1 Patch by TransDERmal route every twenty-four (24) hours. Apply patch to the affected area for 12 hours a day and remove for 12 hours a day. (Patient taking differently: 1 Patch by TransDERmal route daily as needed. Apply patch to the affected area for 12 hours a day and remove for 12 hours a day.)    loperamide (IMODIUM) 2 mg capsule Take 2-4 mg by mouth as needed for Diarrhea. Give 4 mg after first loose stool. Give an additional 2 mg after each loose stool as needed up to a maximum of 8 doses (16 mg/day).  lipase-protease-amylase (Creon) 36,000-114,000- 180,000 unit cpDR capsule Take 1 Cap by mouth as needed (for snacks). 2-3 caps each meal and 1 cap for snacks (see additional order)    finasteride (PROSCAR) 5 mg tablet TAKE 1 TABLET BY MOUTH EVERY DAY    acetaminophen (TYLENOL) 325 mg tablet Take 2 Tabs by mouth every four (4) hours as needed for Pain or Fever (Over the counter medication).  sildenafil citrate (VIAGRA) 50 mg tablet Take 1 Tab by mouth as needed (ED).  alpha-D-galactosidase (BEANO PO) Take 1 Tab by mouth two (2) times a day.  cetirizine (ZYRTEC) 10 mg tablet Take 10 mg by mouth nightly. No current facility-administered medications for this visit. LAB DATA REVIEWED:     Lab Results   Component Value Date/Time    WBC 4.9 01/22/2021 09:17 AM    HGB 11.7 (L) 01/22/2021 09:17 AM    PLATELET 336 (L) 49/81/6739 09:17 AM     Lab Results   Component Value Date/Time    Sodium 136 01/22/2021 09:17 AM    Potassium 3.8 01/22/2021 09:17 AM    Chloride 103 01/22/2021 09:17 AM    CO2 28 01/22/2021 09:17 AM    BUN 15 01/22/2021 09:17 AM    Creatinine 0.81 01/22/2021 09:17 AM    Calcium 8.4 (L) 01/22/2021 09:17 AM    Magnesium 2.4 12/31/2019 07:37 AM    Phosphorus 4.0 08/18/2018 04:20 AM      Lab Results   Component Value Date/Time    Alk.  phosphatase 262 (H) 01/22/2021 09:17 AM    Protein, total 5.8 (L) 01/22/2021 09:17 AM    Albumin 3.1 (L) 01/22/2021 09:17 AM Globulin 2.7 01/22/2021 09:17 AM     Lab Results   Component Value Date/Time    INR 1.1 09/09/2020 02:15 AM    Prothrombin time 11.5 (H) 09/09/2020 02:15 AM    aPTT 29.9 09/09/2020 02:15 AM      Lab Results   Component Value Date/Time    Iron 31 (L) 01/02/2020 03:24 AM    TIBC 245 (L) 01/02/2020 03:24 AM    Iron % saturation 13 (L) 01/02/2020 03:24 AM    Ferritin 122 01/02/2020 03:24 AM      CT chest 12/29/2020  IMPRESSION:     1. Numerous tiny pulmonary parenchymal nodules right greater than left,  suspicious for metastatic disease. 2. Focal ill-defined opacity in the right upper lobe which may represent  infiltrate. Follow-up recommended, however. 3. Incidental findings in the upper abdomen described earlier same day. CONTROLLED SUBSTANCES SAFETY ASSESSMENT (IF ON CONTROLLED SUBSTANCES):     Reviewed opioid safety handout:  [x] Yes   [] No  24 hour opioid dose >150mg morphine equivalent/day:  [] Yes   [x] No  Benzodiazepines:  [] Yes   [x] No  Sleep apnea:  [] Yes   [x] No  Urine Toxicology Testing within last 6 months:  [] Yes   [x] No  History of or new aberrant medication taking behaviors:  [] Yes   [x] No  Has Narcan been prescribed [x] Yes   [] No          Total time: 45 minutes   Counseling / coordination time: 40  > 50% counseling / coordination?:   Consent:  He and/or health care decision maker is aware that that he may receive a bill for this telehealth service, depending on his insurance coverage, and has provided verbal consent to proceed: Yes    CPT Codes 13153-78147 for Established Patients may apply to this Telehealth Visit  Pursuant to the emergency declaration under the [facility], [facility] waiver authority and the [employer] and [employer] Act, this Virtual  Visit was conducted, with patient's consent, to reduce the patient's risk of exposure to COVID-19 and provide continuity of care for an established patient. Services were provided through a video synchronous discussion virtually to substitute for in-person clinic visit.

## 2021-01-27 NOTE — PROGRESS NOTES
Becky Quiñonez is a 60 y.o. male here for follow up for cholangiocarcinoma. Treatment today:  Cycle 1 Day 8 Cisplatin, Paclitaxel, Gemcitabine    1. Have you been to the ER, urgent care clinic since your last visit? Hospitalized since your last visit? no    2. Have you seen or consulted any other health care providers outside of the [address] [place] [address] since your last visit? Include any pap smears or colon screening. No     Pt states he is doing well. No complaints.

## 2021-01-27 NOTE — PATIENT INSTRUCTIONS
Dear Nikolai Trujillo. , 
 
It was a pleasure seeing you today in your home along with your wife virtually We will see you again in 4 weeks If labs or imaging tests have been ordered for you today, please call the office  at [phone] 48 hours after completion to obtain the results. Your stated goal:  
- better pain management Your described symptoms were: Fatigue: 0 Drowsiness: 6 Depression: 0 Pain: 6 Anxiety: 0 Nausea: 2 Anorexia: 2 Dyspnea: 0 Best Well-Bein Constipation: No  
Other Problem (Comment): 0 This is the plan we talked about: 1. Acute low back pain from new L5 met status post ablation and kyphoplasty 
-Your acute worsening of back pain after the L5 biopsy has improved somewhat and you are no longer taking Dilaudid 8 mg every 3 hours. But he still requiring Dilaudid 8 mg every 4-6 hours. We talked about providing you with scripts for 4 mg Dilaudid tablets so you can take 1 or 1.5 or 2 tablets based on your pain level. I am providing you with Dilaudid 4 mg tablets, take 2 tablets every 4 hours as needed in order to allow enough number of tablets for you to titrate on your own. Please fill this prescription after you have completed current supply of 8 mg tablets. 
-Continue OxyContin 40 mg 2 times a day. 2.  Opioid-induced constipation 
-You usually have functional diarrhea but now with increasing opioid use, you are struggling with constipation. Please have 1 mg 1 prune-juice every morning, you are unable to eat a high-fiber diet due to the reversal of Whipple procedure. You can use MiraLAX every day along with 1 stool softener pill. Please stop both once you have diarrhea. 3.  Renal colic 
-Thankfully, this is resolved. The urologist signed off on you. 4. Functional diarrhea 
-This is better since starting Creon 5. Sjogren's disease 
-You have a tendency to get dehydrated very quickly.   We talked about this in detail today including how to be proactive and drink more than a liter of water per day, have Gatorade and lemonade at home. Once you start chemotherapy, if you are not able to keep up with your oral fluid intake, please let us know so we can arrange for you to receive IV fluids in the infusion center or even at home if your insurance allows. We talked about the use of dispatch health which is more for urgent care needs only and not for nonurgent needs. It is better to call us so we can arrange the care that you need proactively. 6.  Multivitamin deficiency 
-You were found to have severe vitamin A and vitamin D deficiency. You are on replacement therapy now. Please discuss with your primary care doctor about rechecking the levels and continuing prescription level replacement. 7. We completed an AMD naming your wife as Nikko. 8.  Disease progression 
-We reviewed your scans and plan for chemotherapy in detail. You are starting chemotherapy this Friday. We talked about potential side effects including nausea, vomiting, diarrhea and dehydration along with fatigue. Given your Sjogren's history, you are at high risk for further dehydration and cramping. We talked about how to stay proactive about this. You do have Zofran and Compazine at home. This is what you have shared with us about Advance Care Planning: 
 
  Primary Decision Maker: Ilya Hayes Spouse - [phone] Secondary Decision Maker: [facility] - Child - [phone] Supplemental (Other) Decision Maker: Roberto Cristina - Child - [phone] Advance Care Planning 1/27/2021 Patient's Healthcare Decision Maker is: Named in scanned ACP document Primary Decision Maker Name -  
Primary Decision Maker Phone Number -  
Primary Decision Maker Relationship to Patient -  
Confirm Advance Directive Yes, on file Patient Would Like to Complete Advance Directive - Does the patient have other document types - The Palliative Medicine Team is here to support you and your family.   
 
 
Sincerely, 
 
 
Evie Hernandez MD and the Palliative Medicine Team

## 2021-01-27 NOTE — PROGRESS NOTES
Palliative Medicine Office Visit  Palliative Medicine Nurse Check In  [phone]    Patient Name: Shelby Kumar YOB: 1955      Date of Office Visit: 1/27/2021  Patient states: \"  \"    From Check In Sheet (scanned in Media):  Has a medical provider talked with you about cardiopulmonary resuscitation (CPR)? [x] Yes   [] No   [] Unable to obtain    Nurse reminder to complete or update ACP FlowSheet:    Is ACP on the Problem List?    [x] Yes    [] No  IF ACP Document is ON FILE; Nurse to place ACP on Problem List     Is there an ACP Note in Chart Review/Note? [x] Yes    [] No   If NO: ALERT PROVIDER       Primary Decision MakerKem Dorota - [phone]    Secondary Decision Maker: Ridge Cristina III - Child - [phone]    Supplemental (Other) Decision Maker: Trish Bennie Child - [phone]  Advance Care Planning 1/27/2021   Patient's [address] is: Named in scanned ACP document   Primary Decision Maker Name -   Primary Decision Maker Phone Number -   Primary Decision Maker Relationship to Patient -   Confirm Advance Directive Yes, on file   Patient Would Like to Complete Advance Directive -   Does the patient have other document types -         Is there anything that we should know about you as a person in order to provide you the best care possible? Have you been to the ER, urgent care clinic since your last visit? [] Yes   [x] No   [] Unable to obtain    Have you been hospitalized since your last visit? [] Yes   [x] No   [] Unable to obtain    Have you seen or consulted any other health care providers outside of the [address] [place] [address] since your last visit?    [] Yes   [x] No   [] Unable to obtain    Functional status (describe):         Last BM: 1/27/2021     accessed (date): 1/27/2021    Bottle review (for opioid pain medication):  Medication 1:   Date filled:   Directions:   # filled:   # left:   # pills taking per day:  Last dose taken:    Medication 2:   Date filled:   Directions:   # filled:   # left:   # pills taking per day:  Last dose taken:    Medication 3:   Date filled:   Directions:   # filled:   # left:   # pills taking per day:  Last dose taken:    Medication 4:   Date filled:   Directions:   # filled:   # left:   # pills taking per day:  Last dose taken:

## 2021-01-28 NOTE — PROGRESS NOTES
[address] [facility]  at [address] [place] [address], [address] [place] [address], [address] [facility]  [phone]    Follow-up Note      Patient: Rey Ewing MRN: [id number]  SSN: [id number]    YOB: 1955  Age: 60 y.o. Sex: male        Diagnosis:     1. 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 S/P Pancreatoduodenectomy on  10/06/2017  2. Adjuvant monotherapy with Capecitabine - s/p 6 cycles   (12/4/2017 - 05/2018)  3. SBRT RP node/soft tissue 04/2020  4. Palliative chemotherapy   Cis/Story/Abraxane - Cycle 1 Day 8     Subjective:      Rey Ewing is a 60 y.o. male with a diagnosis of extrahepatic cholangiocarcinoma with metastatic to the lungs, bones and retroperitoneal node/soft tissue. He presented to the ED in August 2017 with obstructive jaundice. He was found to have an obstructive lesion in the dital bile duct. The CT showed marked intrahepatic biliary dilation and common bile duct dilation to the level of the mid common bile duct, which ws markedly narrowed. He underwent a stenting procedure followed by spyglass cholangiogram. The brushings revealed a diagnosis of cholangiocarcinoma. He underwent a Whipple procedure on 10/06/2017. He completed 6 cycles of adjuvant Xeloda. PET scan showed increased activity in the soft tissue along the SMA. CT guided biopsy showed local recurrence. He underwent SBRT by Dr. Israel Fowler in April 2020. He was admitted with severe back pain. Repeat CT shows growth of tumor in the L4/L5, lung and RP node/soft tissue. He underwent a CT guided celiac plexus block which has helped the back pain immensely. A biopsy of the L4 vertebrae confirms a diagnosis of metastatic cholangiocarcinoma.      Mr. Rocky Mo is receiving palliative chemotherapy. He notes some nausea, diarrhea, and severe fatigue following treatment which has improved. Review of Systems:    Constitutional: fatigue  Eyes: negative  Ears, Nose, Mouth, Throat, and Face: negative  Respiratory: negative  Cardiovascular: negative  Gastrointestinal: nausea, diarrhea  Genitourinary:negative  Integument/Breast: negative  Hematologic/Lymphatic: negative  Musculoskeletal: negative   Neurological: negative      Past Medical History:   Diagnosis Date    Aneurysm ([facility] [address] 2008    CEREBRAL    Arrhythmia     Previous a.fib, ablation, NSR now; NO LONGER FOLLOWED BY CARDIOLOGIST.     Autoimmune disease ([facility] [address]     SJOGREN'S    Cancer ([facility] [address] 2020    COMMON BILE DUCT, ADENOCARCINOMA, SPINE    Diabetes ([facility] [address]     NIDDM    Family history of skin cancer     Hypertension     Sepsis ([facility] [address] 2017    STENTING TO BILE DUCT    Status post chemotherapy     Stroke [facility]) 2008    brain aneurysm - no deficits    Sun-damaged skin     Sunburn, blistering      Past Surgical History:   Procedure Laterality Date    HX CHOLECYSTECTOMY      HX GI      COLONOSCOPY, POLYPS (BENIGN)    HX GI  10/06/2017    [facility]     HX GI  2020    WHIPPLE REVISION    HX HEART CATHETERIZATION  2005    Ablation     HX HERNIA REPAIR  12/2020    HERNIA REPAIR    HX ORTHOPAEDIC  2000    Spinal fusion W/ HARDWARE    HX OTHER SURGICAL  11/07/2017    [place] Cath, Dr. Dasia Babcock HX OTHER SURGICAL  01/11/2021    RIGHT IJ PORT-A-CATH PLACEMENT     HX VASCULAR ACCESS  2017    PORTACATH - then removed    IR KYPHOPLASTY LUMBAR  1/12/2021    NEUROLOGICAL PROCEDURE UNLISTED  2005    [place] hole washout from cerebral hemorrhage    [place] ABDOMEN SURGERY PROC UNLISTED  10/2017    APRIL      Family History   Problem Relation Age of Onset    Cancer Father         Breast and Colon, MELANOMA    Hypertension Father     Cancer Mother         LEUKEMIA    No Known Problems Sister     Atrial Fibrillation Brother     No Known Problems Sister     No Known Problems Son     No Known Problems Son     Anesth Problems Neg Hx      Social History     Tobacco Use    Smoking status: Never Smoker    Smokeless tobacco: Never Used   Substance Use Topics    Alcohol use: Never     Frequency: Never     Comment: very rare      Prior to Admission medications    Medication Sig Start Date End Date Taking? Authorizing Provider   OLANZapine (ZyPREXA) 10 mg tablet Take 1 Tab by mouth See Admin Instructions. Take nightly for 4 days starting the evening of chemotherapy. 1/29/21  Yes Donna Mooney MD   HYDROmorphone (DILAUDID) 4 mg tablet Take 2 Tabs by mouth every four (4) hours as needed for Pain for up to 15 days. Max Daily Amount: 48 mg. 1/27/21 2/11/21 Yes Ras Singer MD   HYDROmorphone (DILAUDID) 8 mg tablet Take 1 Tab by mouth every three (3) hours as needed for Pain for up to 7 days. Max Daily Amount: 64 mg. 1/22/21 1/29/21 Yes Ras Singer MD   HYDROmorphone (DILAUDID) 4 mg tablet Take 1.5 Tabs by mouth every three (3) hours as needed for Pain for up to 15 days. Max Daily Amount: 48 mg. 1/20/21 2/4/21 Yes Negrita Brasher MD   oxyCODONE ER (OxyCONTIN) 20 mg ER tablet Take 2 Tabs by mouth every twelve (12) hours for 15 days. Max Daily Amount: 80 mg. 1/20/21 2/4/21 Yes Negrita Brasher MD   pantoprazole (PROTONIX) 40 mg tablet TAKE 1 TABLET BY MOUTH EVERY DAY 1/11/21  Yes Ras Singer MD   lidocaine-prilocaine (EMLA) topical cream Apply  to affected area as needed for Pain. 30-45 min prior to treatments 1/7/21  Yes Donna Mooney MD   ondansetron (ZOFRAN ODT) 4 mg disintegrating tablet Take 1 Tab by mouth every eight (8) hours as needed for Nausea or Vomiting. 1/7/21  Yes Donna Mooney MD   prochlorperazine (Compazine) 10 mg tablet Take 0.5 Tabs by mouth every six (6) hours as needed for Nausea or Vomiting for up to 60 days.  1/7/21 3/8/21 Yes Donna Mooney MD   dexAMETHasone (DECADRON) 1 mg tablet Take 2 tablets by mouth twice daily for 14 days 12/31/20  Yes Lexus Greene MD   dutasteride (AVODART) 0.5 mg capsule Take 1 Cap by mouth daily. 12/21/20  Yes Maura Lou DO   Creon 36,000-114,000- 180,000 unit cpDR capsule Take 4 Caps by mouth three (3) times daily (with meals). 2-3 caps each meal and 1 cap for snacks (see additional order) 12/21/20  Yes Silas Raya III,    naloxone (NARCAN) 4 mg/actuation nasal spray Use 1 spray intranasally, then discard. Repeat with new spray every 2 min as needed for opioid overdose symptoms, alternating nostrils. 12/11/20  Yes Shaun Chand MD   aluminum & magnesium hydroxide-simethicone (Maalox Maximum Strength) 400-400-40 mg/5 mL suspension Take 10 mL by mouth every six (6) hours as needed for Indigestion. Yes Provider, Historical   vitamin A (AQUASOL A) 10,000 unit capsule Take 1 Cap by mouth daily. 10/26/20  Yes Silas Raya III,    vitamin E (AQUA GEMS) 400 unit capsule Take 1 Cap by mouth daily. 10/26/20  Yes Silas Raya III, DO   empagliflozin (Jardiance) 25 mg tablet Take 1 Tab by mouth nightly. 10/20/20  Yes Silas Raya III, DO   gabapentin (NEURONTIN) 100 mg capsule Take 3 capsules by mouth twice a day. 10/20/20  Yes Silas Raya III, DO   cyanocobalamin (VITAMIN B12) 1,000 mcg/mL injection INJECT CONTENTS OF ONE VIAL INTRAMUSCULARLY EVERY OTHER WEEK  Patient taking differently: INJECT CONTENTS OF ONE VIAL INTRAMUSCULARLY EVERY OTHER WEEK (FIRST AND 15TH OF EACH MONTH) 10/20/20  Yes Silas Raya III,    tamsulosin (FLOMAX) 0.4 mg capsule TAKE ONE CAPSULE BY MOUTH EVERY EVENING 10/20/20  Yes Silas Raya III, DO   ramipriL (ALTACE) 5 mg capsule Take 1 Cap by mouth daily. Patient taking differently: Take 5 mg by mouth nightly. 10/20/20  Yes Czajkowski, Silas B III, DO   lidocaine (LIDODERM) 5 % 1 Patch by TransDERmal route every twenty-four (24) hours.  Apply patch to the affected area for 12 hours a day and remove for 12 hours a day. Patient taking differently: 1 Patch by TransDERmal route daily as needed. Apply patch to the affected area for 12 hours a day and remove for 12 hours a day. 10/15/20  Yes Silas Raya III, DO   loperamide (IMODIUM) 2 mg capsule Take 2-4 mg by mouth as needed for Diarrhea. Give 4 mg after first loose stool. Give an additional 2 mg after each loose stool as needed up to a maximum of 8 doses (16 mg/day). Yes Provider, Historical   lipase-protease-amylase (Creon) 36,000-114,000- 180,000 unit cpDR capsule Take 1 Cap by mouth as needed (for snacks). 2-3 caps each meal and 1 cap for snacks (see additional order)   Yes Provider, Historical   finasteride (PROSCAR) 5 mg tablet TAKE 1 TABLET BY MOUTH EVERY DAY 4/13/20  Yes Provider, Historical   acetaminophen (TYLENOL) 325 mg tablet Take 2 Tabs by mouth every four (4) hours as needed for Pain or Fever (Over the counter medication). 1/2/20  Yes Nicky Tejeda NP   sildenafil citrate (VIAGRA) 50 mg tablet Take 1 Tab by mouth as needed (ED). 5/6/19  Yes Silas Raya III, DO   alpha-D-galactosidase (BEANO PO) Take 1 Tab by mouth two (2) times a day. Yes Other, MD Flynn   cetirizine (ZYRTEC) 10 mg tablet Take 10 mg by mouth nightly.    Yes Provider, Historical          Allergies   Allergen Reactions    Shellfish Derived Anaphylaxis     Soft shell crabs    Morphine Nausea and Vomiting    Pcn [Penicillins] Other (comments)     Pt stated \"always been told PCN\"  Pt tolerated other cephalosporins in the past           Objective:     Visit Vitals  /70   Pulse 79   Temp 97.3 °F (36.3 °C)   Resp 18   Ht 5' 8\" (1.727 m)   Wt 150 lb (68 kg)   SpO2 99%   BMI 22.81 kg/m²       Pain Scale: 0 - No pain/10  Pain Location:        Physical Exam:     GENERAL: alert, cooperative, no distress, appears stated age  EYE: negative  LYMPHATIC: Cervical, supraclavicular, and axillary nodes normal.   THROAT & NECK: normal and no erythema or exudates noted. LUNG: clear to auscultation bilaterally  HEART: regular rate and rhythm  ABDOMEN: soft, non-tender  EXTREMITIES:  no edema  SKIN: Normal.  NEUROLOGIC: negative       Physical exam and ROS has been modified from a prior visit to make it relevant and current        CT Results (most recent): FINDINGS:     CHEST WALL: No mass or axillary lymphadenopathy without contrast.  THYROID: No nodule. MEDIASTINUM: No mass or lymphadenopathy without contrast.  ANGEL: No mass or lymphadenopathy without contrast.  THORACIC AORTA: No aneurysm. MAIN PULMONARY ARTERY: Normal in caliber. TRACHEA/BRONCHI: Patent. ESOPHAGUS: No wall thickening or dilatation. HEART: Normal in size. PLEURA: No effusion or pneumothorax. LUNGS: Multiple tiny parenchymal nodules all new since the prior study in May  2020. The largest on the right is probably in the right lower lobe medially  image 53, 5.2 mm. Although there are a number of tiny nodules on the left, the  largest maxillary lie in the left upper lobe image 35, 2.7 mm. Faint patchy  opacity in the right upper lobe image 38 may represent infiltrate. INCIDENTALLY IMAGED UPPER ABDOMEN: Described separately in a CT of the abdomen  contrast-enhanced earlier same day contrast was not administered on this  examination, including postoperative changes in the pancreas with associated  pneumobilia, and stranding of fat in the left paracolic gutter which is stable  since May 2020. Hitesh Moran BONES: No destructive bone lesion. Incidentally noted old healed rib fractures.     IMPRESSION  IMPRESSION:     1. Numerous tiny pulmonary parenchymal nodules right greater than left,  suspicious for metastatic disease. 2. Focal ill-defined opacity in the right upper lobe which may represent  infiltrate. Follow-up recommended, however. 3. Incidental findings in the upper abdomen described earlier same day. EXAM:  CT abdomen pelvis with contrast     INDICATION: Abdominal pain.  History of cholangiocarcinoma.     COMPARISON: CT 9/20/2020. MRI 9/3/2020.     TECHNIQUE: Helical CT of the abdomen  and pelvis  following the uneventful  intravenous administration of nonionic contrast.  Coronal and sagittal reformats  are performed. CT dose reduction was achieved through use of a standardized  protocol tailored for this examination and automatic exposure control for dose  modulation. Adaptive statistical iterative reconstruction (ASIR) was utilized.     FINDINGS:   The visualized lung bases demonstrate several scattered bilateral peripheral 1  to 2 mm nodules. The heart size is normal. There is no pericardial or pleural  effusion.     Whipple surgical changes are again noted. There is stable pneumobilia. The  gallbladder is surgically absent. The spleen, remainder of the pancreas, and  adrenal glands are normal.      The kidneys are symmetric without hydronephrosis. There is a stable left kidney  cyst.     Poorly defined retroperitoneal soft tissue attenuation surrounding the celiac,  superior mesenteric, and renal arteries measures 3.9 x 5.3 cm (series 3, image  33), previously 4.0 x 4.4 cm. Occlusion of the left renal vein is again seen  (series 3, image 34). Collateral vessels in the central upper abdomen are again  noted.     There are no dilated bowel loops. The appendix is normal.       There are no enlarged lymph nodes. There is no free fluid or free air. The  aorta tapers without aneurysm.     The urinary bladder is normal.  There is no pelvic mass. The prostate is not  enlarged.     There is a round sclerotic bony lesion in the L5 vertebral body measuring 1.2 cm  (series 602, image 66). Posterior L5-S1 fusion hardware is noted.     IMPRESSION  IMPRESSION:   1. Surgical changes are again seen following Whipple procedure. Overall mildly  increased perivascular recurrence in the superior retroperitoneum. Left renal  vein occlusion and upper abdominal collateral vessels are again noted.      2.  Round sclerotic lesion in the L5 vertebral body suspicious for metastasis.     3. Several scattered bilateral peripheral lower lung nodules measuring 1 to 2 mm  may represent infection, inflammation, or metastasis. Lab Results   Component Value Date/Time    WBC 4.8 01/29/2021 09:26 AM    Hemoglobin (POC) 10.3 (L) 10/06/2017 01:14 PM    HGB 11.5 (L) 01/29/2021 09:26 AM    Hematocrit (POC) 36 (L) 01/29/2021 09:32 AM    HCT 35.9 (L) 01/29/2021 09:26 AM    PLATELET 484 (L) 87/47/7487 09:26 AM    MCV 92.1 01/29/2021 09:26 AM       Lab Results   Component Value Date/Time    Sodium 136 01/22/2021 09:17 AM    Potassium 3.8 01/22/2021 09:17 AM    Chloride 103 01/22/2021 09:17 AM    CO2 28 01/22/2021 09:17 AM    Anion gap 5 01/22/2021 09:17 AM    Glucose 172 (H) 01/22/2021 09:17 AM    BUN 15 01/22/2021 09:17 AM    Creatinine 0.81 01/22/2021 09:17 AM    BUN/Creatinine ratio 19 01/22/2021 09:17 AM    GFR est AA >60 01/22/2021 09:17 AM    GFR est non-AA >60 01/22/2021 09:17 AM    Calcium 8.4 (L) 01/22/2021 09:17 AM    Bilirubin, total 0.6 01/22/2021 09:17 AM    Alk. phosphatase 262 (H) 01/22/2021 09:17 AM    Protein, total 5.8 (L) 01/22/2021 09:17 AM    Albumin 3.1 (L) 01/22/2021 09:17 AM    Globulin 2.7 01/22/2021 09:17 AM    A-G Ratio 1.1 01/22/2021 09:17 AM    ALT (SGPT) 48 01/22/2021 09:17 AM    AST (SGOT) 11 (L) 01/22/2021 09:17 AM           Assessment:     1. 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 Local recurrence in the para-aortic soft tissue - S/P SBRT     Recent CT shows progression of disease in the retroperitoneum, L4, lungs  The disease is unresectable.   Treatment will in a palliative intent with a goal to extend life. I will obtain biomarker on the tissue  I proposed systemic therapy with Cis/Newcomb/Abraxane    Receiving palliative chemotherapy   Cis/Newcomb/Abraxane - Cycle 1 Day 8    Tolerating treatment   + nausea, fatigue  A detailed system by system evaluation of side effect was performed to assess chemotherapy related toxicity. Blood counts are acceptable. Results reviewed with the patient. 2. Cancer related pain    S/P celiac plexus block - done by Dr. Jevon Trent with significant improvement in the pain      3. Bone metastasis, L4    Nuclear medicine bone scan  Ablation/Kyphoplasty - Dr. Jevon Trent      4. Nausea, CINV    Aloxi, emend, dexamethasone in [facility]  Add Olanzapine 10 mg po nightly x 4 starting day of chemotherapy      Plan:     > Continue chemotherapy with Cis/Newcomb/Abraxane  > Continue to follow with palliative medicine  > Add Olanzapine  > Follow-up in 2 weeks        I performed a history and physical examination of the patient and discussed his management with the NPP. I reviewed the NPP note and agree with the documented findings and plan of care. The patient was seen in conjunction with Ms. Randy Mathur. Signed by: Deepak Brunson MD                     January 29, 2021      CC. Suly Gentile MD  CC. Leeann Jin MD  CC. Lubna Alexander MD  CC. Geovany De Anda MD  CC.  Vee Valle MD

## 2021-01-29 ENCOUNTER — OFFICE VISIT (OUTPATIENT)
Dept: ONCOLOGY | Age: 66
End: 2021-01-29
Payer: COMMERCIAL

## 2021-01-29 ENCOUNTER — HOSPITAL ENCOUNTER (OUTPATIENT)
Dept: INFUSION THERAPY | Age: 66
Discharge: HOME | End: 2021-01-29
Payer: COMMERCIAL

## 2021-01-29 VITALS
DIASTOLIC BLOOD PRESSURE: 70 MMHG | BODY MASS INDEX: 22.73 KG/M2 | RESPIRATION RATE: 18 BRPM | WEIGHT: 150 LBS | OXYGEN SATURATION: 99 % | TEMPERATURE: 97.3 F | HEIGHT: 68 IN | SYSTOLIC BLOOD PRESSURE: 104 MMHG | HEART RATE: 79 BPM

## 2021-01-29 VITALS
DIASTOLIC BLOOD PRESSURE: 60 MMHG | OXYGEN SATURATION: 97 % | HEART RATE: 75 BPM | TEMPERATURE: 97.3 F | RESPIRATION RATE: 18 BRPM | WEIGHT: 150.7 LBS | SYSTOLIC BLOOD PRESSURE: 104 MMHG | BODY MASS INDEX: 22.84 KG/M2 | HEIGHT: 68 IN

## 2021-01-29 DIAGNOSIS — R11.2 CHEMOTHERAPY INDUCED NAUSEA AND VOMITING: ICD-10-CM

## 2021-01-29 DIAGNOSIS — C79.51 METASTATIC CANCER TO BONE (HCC): Primary | ICD-10-CM

## 2021-01-29 DIAGNOSIS — C24.9 CHOLANGIOCARCINOMA DETERMINED BY BIOPSY OF BILIARY TRACT (HCC): Primary | ICD-10-CM

## 2021-01-29 DIAGNOSIS — T45.1X5A CHEMOTHERAPY INDUCED NAUSEA AND VOMITING: ICD-10-CM

## 2021-01-29 LAB
ANION GAP BLD CALC-SCNC: 15 MMOL/L (ref 10–20)
BASOPHILS # BLD: 0 K/UL (ref 0–0.1)
BASOPHILS NFR BLD: 0 % (ref 0–1)
BUN BLD-MCNC: 13 MG/DL (ref 9–20)
CA-I BLD-MCNC: 1.16 MMOL/L (ref 1.12–1.32)
CHLORIDE BLD-SCNC: 101 MMOL/L (ref 98–107)
CO2 BLD-SCNC: 24 MMOL/L (ref 21–32)
CREAT BLD-MCNC: 0.7 MG/DL (ref 0.6–1.3)
DIFFERENTIAL METHOD BLD: ABNORMAL
EOSINOPHIL # BLD: 0.3 K/UL (ref 0–0.4)
EOSINOPHIL NFR BLD: 7 % (ref 0–7)
ERYTHROCYTE [DISTWIDTH] IN BLOOD BY AUTOMATED COUNT: 13.1 % (ref 11.5–14.5)
GLUCOSE BLD-MCNC: 131 MG/DL (ref 65–100)
HCT VFR BLD AUTO: 35.9 % (ref 36.6–50.3)
HCT VFR BLD CALC: 36 % (ref 36.6–50.3)
HGB BLD-MCNC: 11.5 G/DL (ref 12.1–17)
IMM GRANULOCYTES # BLD AUTO: 0 K/UL (ref 0–0.04)
IMM GRANULOCYTES NFR BLD AUTO: 1 % (ref 0–0.5)
LYMPHOCYTES # BLD: 0.5 K/UL (ref 0.8–3.5)
LYMPHOCYTES NFR BLD: 10 % (ref 12–49)
MAGNESIUM SERPL-MCNC: 2.1 MG/DL (ref 1.6–2.4)
MCH RBC QN AUTO: 29.5 PG (ref 26–34)
MCHC RBC AUTO-ENTMCNC: 32 G/DL (ref 30–36.5)
MCV RBC AUTO: 92.1 FL (ref 80–99)
MONOCYTES # BLD: 0.6 K/UL (ref 0–1)
MONOCYTES NFR BLD: 12 % (ref 5–13)
NEUTS SEG # BLD: 3.4 K/UL (ref 1.8–8)
NEUTS SEG NFR BLD: 70 % (ref 32–75)
NRBC # BLD: 0 K/UL (ref 0–0.01)
NRBC BLD-RTO: 0 PER 100 WBC
PLATELET # BLD AUTO: 109 K/UL (ref 150–400)
PMV BLD AUTO: 10.7 FL (ref 8.9–12.9)
POTASSIUM BLD-SCNC: 3.7 MMOL/L (ref 3.5–5.1)
RBC # BLD AUTO: 3.9 M/UL (ref 4.1–5.7)
RBC MORPH BLD: ABNORMAL
SERVICE CMNT-IMP: ABNORMAL
SODIUM BLD-SCNC: 136 MMOL/L (ref 136–145)
WBC # BLD AUTO: 4.8 K/UL (ref 4.1–11.1)

## 2021-01-29 PROCEDURE — 96360 HYDRATION IV INFUSION INIT: CPT

## 2021-01-29 PROCEDURE — 36415 COLL VENOUS BLD VENIPUNCTURE: CPT

## 2021-01-29 PROCEDURE — 96413 CHEMO IV INFUSION 1 HR: CPT

## 2021-01-29 PROCEDURE — 96417 CHEMO IV INFUS EACH ADDL SEQ: CPT

## 2021-01-29 PROCEDURE — 96361 HYDRATE IV INFUSION ADD-ON: CPT

## 2021-01-29 PROCEDURE — 99215 OFFICE O/P EST HI 40 MIN: CPT | Performed by: NURSE PRACTITIONER

## 2021-01-29 PROCEDURE — 96375 TX/PRO/DX INJ NEW DRUG ADDON: CPT

## 2021-01-29 PROCEDURE — 80047 BASIC METABLC PNL IONIZED CA: CPT

## 2021-01-29 PROCEDURE — 77030012965 HC NDL HUBR BBMI -A

## 2021-01-29 PROCEDURE — 96367 TX/PROPH/DG ADDL SEQ IV INF: CPT

## 2021-01-29 PROCEDURE — 85025 COMPLETE CBC W/AUTO DIFF WBC: CPT

## 2021-01-29 PROCEDURE — 74011250636 HC RX REV CODE- 250/636: Performed by: INTERNAL MEDICINE

## 2021-01-29 PROCEDURE — 83735 ASSAY OF MAGNESIUM: CPT

## 2021-01-29 PROCEDURE — 74011000258 HC RX REV CODE- 258: Performed by: INTERNAL MEDICINE

## 2021-01-29 RX ORDER — HEPARIN 100 UNIT/ML
300-500 SYRINGE INTRAVENOUS AS NEEDED
Status: ACTIVE | OUTPATIENT
Start: 2021-01-29 | End: 2021-01-29

## 2021-01-29 RX ORDER — SODIUM CHLORIDE 0.9 % (FLUSH) 0.9 %
10 SYRINGE (ML) INJECTION AS NEEDED
Status: DISPENSED | OUTPATIENT
Start: 2021-01-29 | End: 2021-01-29

## 2021-01-29 RX ORDER — SODIUM CHLORIDE 9 MG/ML
10 INJECTION INTRAMUSCULAR; INTRAVENOUS; SUBCUTANEOUS AS NEEDED
Status: ACTIVE | OUTPATIENT
Start: 2021-01-29 | End: 2021-01-29

## 2021-01-29 RX ORDER — OLANZAPINE 10 MG/1
10 TABLET ORAL SEE ADMIN INSTRUCTIONS
Qty: 20 TAB | Refills: 1 | Status: SHIPPED | OUTPATIENT
Start: 2021-01-29 | End: 2022-01-01 | Stop reason: ALTCHOICE

## 2021-01-29 RX ORDER — SODIUM CHLORIDE 9 MG/ML
25 INJECTION, SOLUTION INTRAVENOUS CONTINUOUS
Status: DISPENSED | OUTPATIENT
Start: 2021-01-29 | End: 2021-01-29

## 2021-01-29 RX ORDER — PALONOSETRON 0.05 MG/ML
0.25 INJECTION, SOLUTION INTRAVENOUS ONCE
Status: COMPLETED | OUTPATIENT
Start: 2021-01-29 | End: 2021-01-29

## 2021-01-29 RX ADMIN — SODIUM CHLORIDE 150 MG: 900 INJECTION, SOLUTION INTRAVENOUS at 12:22

## 2021-01-29 RX ADMIN — DEXAMETHASONE SODIUM PHOSPHATE 12 MG: 4 INJECTION, SOLUTION INTRA-ARTICULAR; INTRALESIONAL; INTRAMUSCULAR; INTRAVENOUS; SOFT TISSUE at 12:22

## 2021-01-29 RX ADMIN — PALONOSETRON 0.25 MG: 0.05 INJECTION, SOLUTION INTRAVENOUS at 12:59

## 2021-01-29 RX ADMIN — CISPLATIN 45.3 MG: 1 INJECTION INTRAVENOUS at 14:45

## 2021-01-29 RX ADMIN — SODIUM CHLORIDE 25 ML/HR: 900 INJECTION, SOLUTION INTRAVENOUS at 11:58

## 2021-01-29 RX ADMIN — GEMCITABINE HYDROCHLORIDE 1448 MG: 1 INJECTION INTRAVENOUS at 14:03

## 2021-01-29 RX ADMIN — PACLITAXEL 181 MG: 100 INJECTION, POWDER, LYOPHILIZED, FOR SUSPENSION INTRAVENOUS at 13:04

## 2021-01-29 RX ADMIN — Medication 500 UNITS: at 16:00

## 2021-01-29 RX ADMIN — Medication 30 ML: at 16:00

## 2021-01-29 RX ADMIN — POTASSIUM CHLORIDE: 2 INJECTION, SOLUTION, CONCENTRATE INTRAVENOUS at 10:58

## 2021-01-29 NOTE — LETTER
1/29/2021 Patient: Teressa Son. YOB: 1955 Date of Visit: 1/29/2021 Professor Carine Lester DO 
[address] [facility] [address] [address] [address] [employer] Dear Professor Carine Lester DO, Thank you for referring Mr. Rohit Alvarado to [address] [place] [address] for evaluation. My notes for this consultation are attached. If you have questions, please do not hesitate to call me. I look forward to following your patient along with you.  
 
 
Sincerely, 
 
Krissy Sal NP

## 2021-01-29 NOTE — ADDENDUM NOTE
Encounter addended by: Sarah Salguero, PHARMD on: 1/29/2021 10:54 AM   Actions taken: [employer] created or edited

## 2021-01-29 NOTE — PROGRESS NOTES
Outpatient Infusion Center - Chemotherapy Progress Note    0915 - Pt admit to [facility] for C1D8 Abraxane/Gemzar/Cisplatin in stable condition. Assessment completed. Patient denied having any symptoms of COVID-19, i.e. SOB, coughing, fever, or generally not feeling well. Also denies having been exposed to COVID-19 recently or having had any recent contact with family/friend that has a pending COVID test.     R chest port accessed with 0.75\" Jada Durham with positive blood return. Labs drawn per order and sent. Line flushed, clamped, Curos Cap applied to end clave. Chemotherapy Flowsheet 1/29/2021   Cycle C1D8   Date 1/29/2021   Drug / Regimen Abraxane/[place]/Cis   Pre Hydration given   Pre Meds given   Notes given        Patient Vitals for the past 12 hrs:   Temp Pulse Resp BP SpO2   01/29/21 1558  75 18 104/60 97 %   01/29/21 0917 97.3 °F (36.3 °C) 79 18 104/70 99 %        Recent Results (from the past 12 hour(s))   CBC WITH AUTOMATED DIFF    Collection Time: 01/29/21  9:26 AM   Result Value Ref Range    WBC 4.8 4.1 - 11.1 K/uL    RBC 3.90 (L) 4.10 - 5.70 M/uL    HGB 11.5 (L) 12.1 - 17.0 g/dL    HCT 35.9 (L) 36.6 - 50.3 %    MCV 92.1 80.0 - 99.0 FL    MCH 29.5 26.0 - 34.0 PG    MCHC 32.0 30.0 - 36.5 g/dL    RDW 13.1 11.5 - 14.5 %    PLATELET 473 (L) 173 - 400 K/uL    MPV 10.7 8.9 - 12.9 FL    NRBC 0.0 0  WBC    ABSOLUTE NRBC 0.00 0.00 - 0.01 K/uL    NEUTROPHILS 70 32 - 75 %    LYMPHOCYTES 10 (L) 12 - 49 %    MONOCYTES 12 5 - 13 %    EOSINOPHILS 7 0 - 7 %    BASOPHILS 0 0 - 1 %    IMMATURE GRANULOCYTES 1 (H) 0.0 - 0.5 %    ABS. NEUTROPHILS 3.4 1.8 - 8.0 K/UL    ABS. LYMPHOCYTES 0.5 (L) 0.8 - 3.5 K/UL    ABS. MONOCYTES 0.6 0.0 - 1.0 K/UL    ABS. EOSINOPHILS 0.3 0.0 - 0.4 K/UL    ABS. BASOPHILS 0.0 0.0 - 0.1 K/UL    ABS. IMM.  GRANS. 0.0 0.00 - 0.04 K/UL    DF SMEAR SCANNED      RBC COMMENTS NORMOCYTIC, NORMOCHROMIC     MAGNESIUM    Collection Time: 01/29/21  9:26 AM   Result Value Ref Range    Magnesium 2.1 1.6 - 2.4 mg/dL   POC CHEM8    Collection Time: 01/29/21  9:32 AM   Result Value Ref Range    Calcium, ionized (POC) 1.16 1.12 - 1.32 mmol/L    Sodium (POC) 136 136 - 145 mmol/L    Potassium (POC) 3.7 3.5 - 5.1 mmol/L    Chloride (POC) 101 98 - 107 mmol/L    CO2 (POC) 24 21 - 32 mmol/L    Anion gap (POC) 15 10 - 20 mmol/L    Glucose (POC) 131 (H) 65 - 100 mg/dL    BUN (POC) 13 9 - 20 mg/dL    Creatinine (POC) 0.7 0.6 - 1.3 mg/dL    GFRAA, POC >60 >60 ml/min/1.73m2    GFRNA, POC >60 >60 ml/min/1.73m2    Hematocrit (POC) 36 (L) 36.6 - 50.3 %    Comment Comment Not Indicated.           Medications:  Medications Administered     0.9% sodium chloride 1,000 mL with potassium chloride 10 mEq, magnesium sulfate 2 g infusion     Admin Date  01/29/2021 Action  Given Dose   Rate  1,000 mL/hr Route  IntraVENous Administered By  [facility]          0.9% sodium chloride infusion     Admin Date  01/29/2021 Action  New Bag Dose  25 mL/hr Rate  25 mL/hr Route  IntraVENous Administered By  [facility]          CISplatin (PLATINOL) 45.3 mg in 0.9% sodium chloride 250 mL, overfill volume 25 mL chemo infusion     Admin Date  01/29/2021 Action  New Bag Dose  45.3 mg Rate  320.3 mL/hr Route  IntraVENous Administered By  [facility]          dexamethasone (DECADRON) 12 mg in 0.9% sodium chloride 50 mL IVPB     Admin Date  01/29/2021 Action  Given Dose  12 mg Route  IntraVENous Administered By  [facility]          fosaprepitant (EMEND) 150 mg in 0.9% sodium chloride 150 mL IVPB     Admin Date  01/29/2021 Action  Given Dose  150 mg Rate  450 mL/hr Route  IntraVENous Administered By  [facility]          gemcitabine (GEMZAR) 1,448 mg in 0.9% sodium chloride 250 mL, overfill volume 25 mL chemo infusion     Admin Date  01/29/2021 Action  New Bag Dose  1,448 mg Rate  626.2 mL/hr Route  IntraVENous Administered By  Thurmon Bee          heparin (porcine) pf 300-500 Units     Admin Date  01/29/2021 Action  Given Dose  500 Units Route  InterCATHeter Administered By  Cinthia Finkona bound (ABRAXANE) 181 mg in 0.9% sodium chloride 36.2 mL chemo infusion     Admin Date  01/29/2021 Action  New Bag Dose  181 mg Rate  72.4 mL/hr Route  IntraVENous Administered By  Gin Zambrano          palonosetron HCl (ALOXI) injection 0.25 mg     Admin Date  01/29/2021 Action  Given Dose  0.25 mg Route  IntraVENous Administered By  Gin Zambrano          sodium chloride (NS) flush 10 mL     Admin Date  01/29/2021 Action  Given Dose  30 mL Route  IntraVENous Administered By  Gin Zambrano                 Pt tolerated treatment well. Port maintained positive blood return throughout treatment, flushed with positive blood return at conclusion, and de-accessed. 1605 - D/c home in no distress.  Pt aware of next [facility] appointment scheduled for:    Future Appointments   Date Time Provider Sloane Roach   2/11/2021  9:00 AM CHAIR 2 [address] REG   2/18/2021 10:00 AM CHAIR 2 [address] REG   2/18/2021 10:30 AM Chevy Simmons NP ONCMR BS AMB   3/5/2021  9:00 AM [facility] CHAIR 2 [facility] REG   3/12/2021  9:00 AM CHAIR 2 [facility] REG

## 2021-02-01 ENCOUNTER — PATIENT OUTREACH (OUTPATIENT)
Dept: OTHER | Age: 66
End: 2021-02-01

## 2021-02-01 NOTE — PROGRESS NOTES
Resolving [place] episode/  Resuming CCM     Patient with cholangiocarcinoma s/p pylorus-preserving Whipple 10/2017; G_J George-EN Y revision 2020, HTN, DM, BPH and chronic LBP  * IP stay  -   - intractable pain ;  Celiac plexus block/ palliative care.    -Progressive stage IV disease- L5 metastasis, growth in paraaortic mass, worsening pain and small worrisome lung nodules.     Resolving current episode (Transitions of care complete). No further ED/UC or hospital admissions within 30 days post discharge. Patient attended follow-up appointments as directed. 1:00 pm  Contacted  patient for CM follow up services. Verified  and address for HIPAA security. * Mr. Len Colbert reports that he has been tired following Chemotherapy, but not nauseas. Able to eat. - He reports feeling happy that he has not had more severe side effects. * Has 2 more chemo sessions before re-evaluating his status. * Back pain has diminished substantially. * Enjoyed the snow/ in his recliner with a heating blanket and looking out. * Talked about COVID vaccine. Informed patient that [employer] patients (1b status)  can schedule Vaccines-  Shared phone number [phone]. He did not know this. - CM refers patient to Oncology to approve vaccine administration to ensure safety. * MED CHANGES:  None. * MD APTS:  Aracely Goel  ;  -  Next session ;   * Patient Concerns:   None voiced. * CM Concerns:    * Education/ Resources. EMAIL sent to Liz Arellano-  Spouse   [email or web address]  COVID Vaccine available to [employer] [facility] practice patients in 1B category. Will follow for CM services. Next planned outreach:    - after chemo. Chart Review:  Onc FU - Dr. Lynette Menezes    Diagnosis:      1. 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 Extrahepatic cholangiocarcinoma:  T3 N1 (1 of 12 LN +ve)  Invasion into pancreas  R0 resection     Treatment:      1. S/P Pancreatoduodenectomy on  10/06/2017  2. Adjuvant monotherapy with Capecitabine - s/p 6 cycles              (12/4/2017 - 05/2018)  3. SBRT RP node/soft tissue 04/2020  4. Palliative chemotherapy              Cis/[place]/Abraxane - Cycle 1 Day 8      Assessment:      1. 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    Local recurrence in the para-aortic soft tissue - S/P SBRT      Recent CT shows progression of disease in the retroperitoneum, L4, lungs  The disease is unresectable. Treatment will in a palliative intent with a goal to extend life. I will obtain biomarker on the tissue  I proposed systemic therapy with Cis/[place]a/Abraxane     Receiving palliative chemotherapy              Cis/[place]a/Abraxane - Cycle 1 Day 8     Tolerating treatment   + nausea, fatigue  A detailed system by system evaluation of side effect was performed to assess chemotherapy related toxicity. Blood counts are acceptable. Results reviewed with the patient.        2. Cancer related pain     S/P celiac plexus block - done by Dr. Jeffery Chang with significant improvement in the pain        3. Bone metastasis, L4     Nuclear medicine bone scan  Ablation/Kyphoplasty - Dr. Jeffery Chang        4.  Nausea, CINV     Aloxi, emend, dexamethasone in OPIC  Add Olanzapine 10 mg po nightly x 4 starting day of chemotherapy        Plan:      > Continue chemotherapy with Cis/[place]a/Abraxane  > Continue to follow with palliative medicine  > Add Olanzapine  > Follow-up in 2 weeks

## 2021-02-02 RX ORDER — ONDANSETRON 8 MG/1
TABLET, ORALLY DISINTEGRATING ORAL
Qty: 18 TAB | Refills: 2 | OUTPATIENT
Start: 2021-02-02

## 2021-02-03 DIAGNOSIS — R10.9 INTRACTABLE ABDOMINAL PAIN: ICD-10-CM

## 2021-02-03 DIAGNOSIS — C22.1 CHOLANGIOCARCINOMA OF BILIARY TRACT (HCC): ICD-10-CM

## 2021-02-03 RX ORDER — OXYCODONE HCL 20 MG/1
40 TABLET, FILM COATED, EXTENDED RELEASE ORAL EVERY 12 HOURS
Qty: 120 TAB | Refills: 0 | Status: SHIPPED | OUTPATIENT
Start: 2021-02-03 | End: 2021-02-24 | Stop reason: SDUPTHER

## 2021-02-03 NOTE — TELEPHONE ENCOUNTER
Triage for Controlled Substance Refill Request    Pain Diagnosis: _Cholangiocarcinoma of biliary tract. Last Outpatient Visit: _1/27/2021    Next Outpatient Visit: _2/24/2021    Reason for refill needed outside of office visit? -Appointment not scheduled prior to need for scheduled refill      Pharmacy: [facility] [address]    oxyCODONE ER (OxyCONTIN) 20 mg ER  Medication:  Dose and directions: Take 2 Tabs by mouth every twelve (12) hours  Number dispensed:60  Date filled ( or Pharmacy):1/20/2021  # left:       reviewed: _yes     Date of Urine Drug Screen:  _n/a    Opioid Safety Handout given:  _yes     Appropriate for refill:  _yes     Action:  _ please refill

## 2021-02-03 NOTE — TELEPHONE ENCOUNTER
----- Message from [place]. Paty Ayala. sent at 2/3/2021  7:38 AM EST -----  Regarding: Prescription Question  Contact: [phone]  Ton Jimenez,  I have 2 days worth of medication for Oxycontin 20 ER 2 tablets every 12 hours. The pharmacy  used is [facility] [phone]. Thank you for your support.   Tom Santiago

## 2021-02-04 ENCOUNTER — HOSPITAL ENCOUNTER (OUTPATIENT)
Dept: INFUSION THERAPY | Age: 66
Discharge: HOME | End: 2021-02-04
Payer: COMMERCIAL

## 2021-02-04 ENCOUNTER — TELEPHONE (OUTPATIENT)
Dept: ONCOLOGY | Age: 66
End: 2021-02-04

## 2021-02-04 VITALS
RESPIRATION RATE: 16 BRPM | BODY MASS INDEX: 22.05 KG/M2 | WEIGHT: 145 LBS | HEART RATE: 74 BPM | TEMPERATURE: 97.8 F | OXYGEN SATURATION: 98 % | DIASTOLIC BLOOD PRESSURE: 63 MMHG | SYSTOLIC BLOOD PRESSURE: 101 MMHG

## 2021-02-04 DIAGNOSIS — C22.1 CHOLANGIOCARCINOMA OF BILIARY TRACT (HCC): Primary | ICD-10-CM

## 2021-02-04 DIAGNOSIS — C24.9 CHOLANGIOCARCINOMA DETERMINED BY BIOPSY OF BILIARY TRACT (HCC): ICD-10-CM

## 2021-02-04 PROCEDURE — 74011250636 HC RX REV CODE- 250/636: Performed by: INTERNAL MEDICINE

## 2021-02-04 PROCEDURE — 96360 HYDRATION IV INFUSION INIT: CPT

## 2021-02-04 PROCEDURE — 77030012965 HC NDL HUBR BBMI -A

## 2021-02-04 PROCEDURE — 74011000250 HC RX REV CODE- 250: Performed by: INTERNAL MEDICINE

## 2021-02-04 RX ORDER — SODIUM CHLORIDE 9 MG/ML
10 INJECTION INTRAMUSCULAR; INTRAVENOUS; SUBCUTANEOUS AS NEEDED
Status: DISCONTINUED | OUTPATIENT
Start: 2021-02-04 | End: 2021-02-05 | Stop reason: HOSPADM

## 2021-02-04 RX ORDER — SODIUM CHLORIDE 0.9 % (FLUSH) 0.9 %
10 SYRINGE (ML) INJECTION AS NEEDED
Status: DISCONTINUED | OUTPATIENT
Start: 2021-02-04 | End: 2021-02-05 | Stop reason: HOSPADM

## 2021-02-04 RX ORDER — HEPARIN 100 UNIT/ML
500 SYRINGE INTRAVENOUS AS NEEDED
Status: DISCONTINUED | OUTPATIENT
Start: 2021-02-04 | End: 2021-02-05 | Stop reason: HOSPADM

## 2021-02-04 RX ADMIN — Medication 500 UNITS: at 15:52

## 2021-02-04 RX ADMIN — Medication 10 ML: at 15:52

## 2021-02-04 RX ADMIN — SODIUM CHLORIDE 1000 ML: 900 INJECTION, SOLUTION INTRAVENOUS at 14:51

## 2021-02-04 RX ADMIN — SODIUM CHLORIDE 10 ML: 9 INJECTION, SOLUTION INTRAMUSCULAR; INTRAVENOUS; SUBCUTANEOUS at 14:51

## 2021-02-04 NOTE — PROGRESS NOTES
[facility] VISIT NOTE    1440  Pt arrived at [facility] ambulatory and in no distress for hydration. Assessment completed, pt c/o abdominal fullness, poor appetite and fatigue. Blood pressure 101/67, pulse 83, temperature 97.8 °F (36.6 °C), resp. rate 16, weight 65.8 kg (145 lb), SpO2 98 %. Right chest port accessed with 0.75 in cagle no difficulty. Positive blood return noted. Medications received:  1 liter NS IV    Blood pressure 101/63, pulse 74, temperature 97.8 °F (36.6 °C), resp. rate 16, weight 65.8 kg (145 lb), SpO2 98 %. Tolerated treatment well, no adverse reaction noted. Port de-accessed and flushed per protocol. Positive blood return noted. 1555  D/C'd from [facility] ambulatory and in no distress. Next appointment is 2/11/21 at [address] [place] [address].    Patient denied having any symptoms of COVID-19, i.e. SOB, coughing, fever, or generally not feeling well.   Also denies having been exposed to COVID-19 recently or having had any recent contact with family/friend that has a pending COVID test.

## 2021-02-04 NOTE — TELEPHONE ENCOUNTER
Patient called and stated he feels hes need fluids today he is really fatigue not feeling and thinks he is dehydrated.  He would like a callback as soon as possible       [phone]

## 2021-02-11 ENCOUNTER — HOSPITAL ENCOUNTER (OUTPATIENT)
Dept: INFUSION THERAPY | Age: 66
Discharge: HOME | End: 2021-02-11
Payer: COMMERCIAL

## 2021-02-11 VITALS
HEART RATE: 64 BPM | WEIGHT: 149.8 LBS | SYSTOLIC BLOOD PRESSURE: 107 MMHG | TEMPERATURE: 97.1 F | DIASTOLIC BLOOD PRESSURE: 62 MMHG | RESPIRATION RATE: 18 BRPM | BODY MASS INDEX: 22.78 KG/M2

## 2021-02-11 DIAGNOSIS — C24.9 CHOLANGIOCARCINOMA DETERMINED BY BIOPSY OF BILIARY TRACT (HCC): Primary | ICD-10-CM

## 2021-02-11 LAB
ALBUMIN SERPL-MCNC: 3.1 G/DL (ref 3.5–5)
ALBUMIN/GLOB SERPL: 1 {RATIO} (ref 1.1–2.2)
ALP SERPL-CCNC: 249 U/L (ref 45–117)
ALT SERPL-CCNC: 40 U/L (ref 12–78)
ANION GAP SERPL CALC-SCNC: 6 MMOL/L (ref 5–15)
AST SERPL-CCNC: 18 U/L (ref 15–37)
BASOPHILS # BLD: 0 K/UL (ref 0–0.1)
BASOPHILS NFR BLD: 0 % (ref 0–1)
BILIRUB SERPL-MCNC: 0.6 MG/DL (ref 0.2–1)
BUN SERPL-MCNC: 13 MG/DL (ref 6–20)
BUN/CREAT SERPL: 18 (ref 12–20)
CALCIUM SERPL-MCNC: 8.6 MG/DL (ref 8.5–10.1)
CHLORIDE SERPL-SCNC: 106 MMOL/L (ref 97–108)
CO2 SERPL-SCNC: 26 MMOL/L (ref 21–32)
CREAT SERPL-MCNC: 0.71 MG/DL (ref 0.7–1.3)
DIFFERENTIAL METHOD BLD: ABNORMAL
EOSINOPHIL # BLD: 0.2 K/UL (ref 0–0.4)
EOSINOPHIL NFR BLD: 4 % (ref 0–7)
ERYTHROCYTE [DISTWIDTH] IN BLOOD BY AUTOMATED COUNT: 13.6 % (ref 11.5–14.5)
GLOBULIN SER CALC-MCNC: 3.1 G/DL (ref 2–4)
GLUCOSE SERPL-MCNC: 129 MG/DL (ref 65–100)
HCT VFR BLD AUTO: 35.2 % (ref 36.6–50.3)
HGB BLD-MCNC: 11.2 G/DL (ref 12.1–17)
IMM GRANULOCYTES # BLD AUTO: 0 K/UL (ref 0–0.04)
IMM GRANULOCYTES NFR BLD AUTO: 0 % (ref 0–0.5)
LYMPHOCYTES # BLD: 0.3 K/UL (ref 0.8–3.5)
LYMPHOCYTES NFR BLD: 6 % (ref 12–49)
MAGNESIUM SERPL-MCNC: 2.2 MG/DL (ref 1.6–2.4)
MCH RBC QN AUTO: 29.2 PG (ref 26–34)
MCHC RBC AUTO-ENTMCNC: 31.8 G/DL (ref 30–36.5)
MCV RBC AUTO: 91.9 FL (ref 80–99)
MONOCYTES # BLD: 0.7 K/UL (ref 0–1)
MONOCYTES NFR BLD: 12 % (ref 5–13)
NEUTS BAND NFR BLD MANUAL: 1 %
NEUTS SEG # BLD: 4.3 K/UL (ref 1.8–8)
NEUTS SEG NFR BLD: 77 % (ref 32–75)
NRBC # BLD: 0 K/UL (ref 0–0.01)
NRBC BLD-RTO: 0 PER 100 WBC
PLATELET # BLD AUTO: 183 K/UL (ref 150–400)
PMV BLD AUTO: 10.3 FL (ref 8.9–12.9)
POTASSIUM SERPL-SCNC: 3.6 MMOL/L (ref 3.5–5.1)
PROT SERPL-MCNC: 6.2 G/DL (ref 6.4–8.2)
RBC # BLD AUTO: 3.83 M/UL (ref 4.1–5.7)
RBC MORPH BLD: ABNORMAL
SODIUM SERPL-SCNC: 138 MMOL/L (ref 136–145)
WBC # BLD AUTO: 5.5 K/UL (ref 4.1–11.1)

## 2021-02-11 PROCEDURE — 74011000250 HC RX REV CODE- 250: Performed by: INTERNAL MEDICINE

## 2021-02-11 PROCEDURE — 85025 COMPLETE CBC W/AUTO DIFF WBC: CPT

## 2021-02-11 PROCEDURE — 83735 ASSAY OF MAGNESIUM: CPT

## 2021-02-11 PROCEDURE — 96413 CHEMO IV INFUSION 1 HR: CPT

## 2021-02-11 PROCEDURE — 74011250636 HC RX REV CODE- 250/636: Performed by: INTERNAL MEDICINE

## 2021-02-11 PROCEDURE — 96417 CHEMO IV INFUS EACH ADDL SEQ: CPT

## 2021-02-11 PROCEDURE — 96375 TX/PRO/DX INJ NEW DRUG ADDON: CPT

## 2021-02-11 PROCEDURE — 74011000258 HC RX REV CODE- 258: Performed by: INTERNAL MEDICINE

## 2021-02-11 PROCEDURE — 80053 COMPREHEN METABOLIC PANEL: CPT

## 2021-02-11 PROCEDURE — 77030012965 HC NDL HUBR BBMI -A

## 2021-02-11 PROCEDURE — 96367 TX/PROPH/DG ADDL SEQ IV INF: CPT

## 2021-02-11 PROCEDURE — 36415 COLL VENOUS BLD VENIPUNCTURE: CPT

## 2021-02-11 RX ORDER — PALONOSETRON 0.05 MG/ML
0.25 INJECTION, SOLUTION INTRAVENOUS ONCE
Status: COMPLETED | OUTPATIENT
Start: 2021-02-11 | End: 2021-02-11

## 2021-02-11 RX ORDER — SODIUM CHLORIDE 0.9 % (FLUSH) 0.9 %
10 SYRINGE (ML) INJECTION AS NEEDED
Status: DISPENSED | OUTPATIENT
Start: 2021-02-11 | End: 2021-02-11

## 2021-02-11 RX ORDER — SODIUM CHLORIDE 9 MG/ML
10 INJECTION INTRAMUSCULAR; INTRAVENOUS; SUBCUTANEOUS AS NEEDED
Status: DISPENSED | OUTPATIENT
Start: 2021-02-11 | End: 2021-02-11

## 2021-02-11 RX ORDER — HEPARIN 100 UNIT/ML
300-500 SYRINGE INTRAVENOUS AS NEEDED
Status: ACTIVE | OUTPATIENT
Start: 2021-02-11 | End: 2021-02-11

## 2021-02-11 RX ORDER — SODIUM CHLORIDE 9 MG/ML
25 INJECTION, SOLUTION INTRAVENOUS CONTINUOUS
Status: DISPENSED | OUTPATIENT
Start: 2021-02-11 | End: 2021-02-11

## 2021-02-11 RX ADMIN — SODIUM CHLORIDE 25 ML/HR: 9 INJECTION, SOLUTION INTRAVENOUS at 09:51

## 2021-02-11 RX ADMIN — SODIUM CHLORIDE 150 MG: 900 INJECTION, SOLUTION INTRAVENOUS at 11:10

## 2021-02-11 RX ADMIN — PALONOSETRON 0.25 MG: 0.05 INJECTION, SOLUTION INTRAVENOUS at 11:03

## 2021-02-11 RX ADMIN — Medication 500 UNITS: at 14:46

## 2021-02-11 RX ADMIN — GEMCITABINE HYDROCHLORIDE 1448 MG: 1 INJECTION, SOLUTION INTRAVENOUS at 12:51

## 2021-02-11 RX ADMIN — CISPLATIN 45.3 MG: 1 INJECTION INTRAVENOUS at 13:40

## 2021-02-11 RX ADMIN — SODIUM CHLORIDE 10 ML: 9 INJECTION, SOLUTION INTRAMUSCULAR; INTRAVENOUS; SUBCUTANEOUS at 09:18

## 2021-02-11 RX ADMIN — POTASSIUM CHLORIDE: 2 INJECTION, SOLUTION, CONCENTRATE INTRAVENOUS at 09:55

## 2021-02-11 RX ADMIN — PACLITAXEL 181 MG: 100 INJECTION, POWDER, LYOPHILIZED, FOR SUSPENSION INTRAVENOUS at 12:12

## 2021-02-11 RX ADMIN — DEXAMETHASONE SODIUM PHOSPHATE 12 MG: 4 INJECTION, SOLUTION INTRA-ARTICULAR; INTRALESIONAL; INTRAMUSCULAR; INTRAVENOUS; SOFT TISSUE at 11:09

## 2021-02-11 RX ADMIN — Medication 10 ML: at 14:46

## 2021-02-11 NOTE — PROGRESS NOTES
Outpatient Infusion Center - Chemotherapy Progress Note    0910  - Pt admit to [facility] for Abraxtane/Gemzar/Cisplatin in stable condition. Assessment completed. Patient denied having any symptoms of COVID-19, i.e. SOB, coughing, fever, or generally not feeling well. Also denies having been exposed to COVID-19 recently or having had any recent contact with family/friend that has a pending COVID test.     R chest port accessed with positive blood return. Labs drawn per order and sent. Line flushed, clamped, Curos Cap applied to end clave. Chemotherapy Flowsheet 2/11/2021   Cycle C2D1   Date 2/11/2021   Drug / Regimen Abraxtane/Gemzar/Cisplatin   Pre Hydration -   Pre Meds -   Notes -        Patient Vitals for the past 12 hrs:   Temp Pulse Resp BP   02/11/21 1444  64 18 107/62   02/11/21 0910 97.1 °F (36.2 °C) 81 18 114/70        Recent Results (from the past 12 hour(s))   CBC WITH AUTOMATED DIFF    Collection Time: 02/11/21  9:16 AM   Result Value Ref Range    WBC 5.5 4.1 - 11.1 K/uL    RBC 3.83 (L) 4.10 - 5.70 M/uL    HGB 11.2 (L) 12.1 - 17.0 g/dL    HCT 35.2 (L) 36.6 - 50.3 %    MCV 91.9 80.0 - 99.0 FL    MCH 29.2 26.0 - 34.0 PG    MCHC 31.8 30.0 - 36.5 g/dL    RDW 13.6 11.5 - 14.5 %    PLATELET 488 704 - 846 K/uL    MPV 10.3 8.9 - 12.9 FL    NRBC 0.0 0  WBC    ABSOLUTE NRBC 0.00 0.00 - 0.01 K/uL    NEUTROPHILS 77 (H) 32 - 75 %    BAND NEUTROPHILS 1 %    LYMPHOCYTES 6 (L) 12 - 49 %    MONOCYTES 12 5 - 13 %    EOSINOPHILS 4 0 - 7 %    BASOPHILS 0 0 - 1 %    IMMATURE GRANULOCYTES 0 0.0 - 0.5 %    ABS. NEUTROPHILS 4.3 1.8 - 8.0 K/UL    ABS. LYMPHOCYTES 0.3 (L) 0.8 - 3.5 K/UL    ABS. MONOCYTES 0.7 0.0 - 1.0 K/UL    ABS. EOSINOPHILS 0.2 0.0 - 0.4 K/UL    ABS. BASOPHILS 0.0 0.0 - 0.1 K/UL    ABS. IMM.  GRANS. 0.0 0.00 - 0.04 K/UL    DF MANUAL      RBC COMMENTS RIGO CELLS  PRESENT       METABOLIC PANEL, COMPREHENSIVE    Collection Time: 02/11/21  9:16 AM   Result Value Ref Range    Sodium 138 136 - 145 mmol/L Potassium 3.6 3.5 - 5.1 mmol/L    Chloride 106 97 - 108 mmol/L    CO2 26 21 - 32 mmol/L    Anion gap 6 5 - 15 mmol/L    Glucose 129 (H) 65 - 100 mg/dL    BUN 13 6 - 20 MG/DL    Creatinine 0.71 0.70 - 1.30 MG/DL    BUN/Creatinine ratio 18 12 - 20      GFR est AA >60 >60 ml/min/1.73m2    GFR est non-AA >60 >60 ml/min/1.73m2    Calcium 8.6 8.5 - 10.1 MG/DL    Bilirubin, total 0.6 0.2 - 1.0 MG/DL    ALT (SGPT) 40 12 - 78 U/L    AST (SGOT) 18 15 - 37 U/L    Alk.  phosphatase 249 (H) 45 - 117 U/L    Protein, total 6.2 (L) 6.4 - 8.2 g/dL    Albumin 3.1 (L) 3.5 - 5.0 g/dL    Globulin 3.1 2.0 - 4.0 g/dL    A-G Ratio 1.0 (L) 1.1 - 2.2     MAGNESIUM    Collection Time: 02/11/21  9:16 AM   Result Value Ref Range    Magnesium 2.2 1.6 - 2.4 mg/dL        Medications:  Medications Administered     0.9% sodium chloride 1,000 mL with potassium chloride 10 mEq, magnesium sulfate 2 g infusion     Admin Date  02/11/2021 Action  Given Dose   Rate  1,000 mL/hr Route  IntraVENous Administered By  ESVIN Bobby          0.9% sodium chloride infusion     Admin Date  02/11/2021 Action  New Bag Dose  25 mL/hr Rate  25 mL/hr Route  IntraVENous Administered By  ESVIN Bobby          0.9% sodium chloride injection 10 mL     Admin Date  02/11/2021 Action  Given Dose  10 mL Route  IntraVENous Administered By  ESVIN Bobby          CISplatin (PLATINOL) 45.3 mg in 0.9% sodium chloride 250 mL, overfill volume 25 mL chemo infusion     Admin Date  02/11/2021 Action  New Bag Dose  45.3 mg Rate  320.3 mL/hr Route  IntraVENous Administered By  ESVIN Bobby          dexamethasone (DECADRON) 12 mg in 0.9% sodium chloride 50 mL IVPB     Admin Date  02/11/2021 Action  Given Dose  12 mg Route  IntraVENous Administered By  ESVIN Bobby          fosaprepitant (EMEND) 150 mg in 0.9% sodium chloride 150 mL IVPB     Admin Date  02/11/2021 Action  Given Dose  150 mg Rate  450 mL/hr Route  IntraVENous Administered By  Sabina Barriga RN          gemcitabine (GEMZAR) 1,448 mg in 0.9% sodium chloride 250 mL, overfill volume 25 mL chemo infusion     Admin Date  02/11/2021 Action  New Bag Dose  1,448 mg Rate  626.2 mL/hr Route  IntraVENous Administered By  Sabina Barriga RN          heparin (porcine) pf 300-500 Units     Admin Date  02/11/2021 Action  Given Dose  500 Units Route  InterCATHeter Administered By  Sabina Barriga RN          PACLitaxel-protein bound (ABRAXANE) 181 mg in 0.9% sodium chloride 36.2 mL chemo infusion     Admin Date  02/11/2021 Action  New Bag Dose  181 mg Rate  72.4 mL/hr Route  IntraVENous Administered By  Sabina Barriga RN          palonosetron HCl (ALOXI) injection 0.25 mg     Admin Date  02/11/2021 Action  Given Dose  0.25 mg Route  IntraVENous Administered By  Sabina Barriga RN          sodium chloride (NS) flush 10 mL     Admin Date  02/11/2021 Action  Given Dose  10 mL Route  IntraVENous Administered By  Sabina Barriga RN                 Pt tolerated treatment well. Port maintained positive blood return throughout treatment, flushed with positive blood return at conclusion, and de-accessed. 1450 - D/c home in no distress.  Pt aware of next [facility] appointment scheduled for:    Future Appointments   Date Time Provider Sloane Roach   2/18/2021 10:00 AM CHAIR 2 [address] REG   2/18/2021 10:30 AM Claudette Ellis, NP ONCMR BS AMB   2/24/2021  2:30 PM [facility]   3/5/2021  9:00 AM [facility] CHAIR 2 [facility] REG   3/12/2021  9:00 AM CHAIR 2 [facility] REG

## 2021-02-12 ENCOUNTER — APPOINTMENT (OUTPATIENT)
Dept: INFUSION THERAPY | Age: 66
End: 2021-02-12
Payer: COMMERCIAL

## 2021-02-16 NOTE — PROGRESS NOTES
Efrain Boxer. is a 60 y.o. male here for follow up for cholangiocarcinoma. Treatment today:  Cycle 2 Day 8 NAB-Paclitaxel + Gemcitabine + Cisplatin    1. Have you been to the ER, urgent care clinic since your last visit? Hospitalized since your last visit? 2. Have you seen or consulted any other health care providers outside of the [address] [place] [address] since your last visit? Include any pap smears or colon screening.

## 2021-02-18 ENCOUNTER — HOSPITAL ENCOUNTER (OUTPATIENT)
Dept: INFUSION THERAPY | Age: 66
Discharge: HOME | End: 2021-02-18
Payer: COMMERCIAL

## 2021-02-18 ENCOUNTER — OFFICE VISIT (OUTPATIENT)
Dept: ONCOLOGY | Age: 66
End: 2021-02-18
Payer: COMMERCIAL

## 2021-02-18 VITALS
HEIGHT: 68 IN | TEMPERATURE: 97.3 F | WEIGHT: 148 LBS | BODY MASS INDEX: 22.43 KG/M2 | OXYGEN SATURATION: 99 % | DIASTOLIC BLOOD PRESSURE: 63 MMHG | HEART RATE: 82 BPM | RESPIRATION RATE: 18 BRPM | SYSTOLIC BLOOD PRESSURE: 106 MMHG

## 2021-02-18 VITALS
DIASTOLIC BLOOD PRESSURE: 67 MMHG | OXYGEN SATURATION: 99 % | TEMPERATURE: 97.3 F | SYSTOLIC BLOOD PRESSURE: 105 MMHG | RESPIRATION RATE: 18 BRPM | BODY MASS INDEX: 22.43 KG/M2 | WEIGHT: 148 LBS | HEIGHT: 68 IN | HEART RATE: 75 BPM

## 2021-02-18 DIAGNOSIS — C79.51 METASTATIC CANCER TO BONE (HCC): ICD-10-CM

## 2021-02-18 DIAGNOSIS — C24.9 CHOLANGIOCARCINOMA DETERMINED BY BIOPSY OF BILIARY TRACT (HCC): Primary | ICD-10-CM

## 2021-02-18 DIAGNOSIS — C22.1 CHOLANGIOCARCINOMA OF BILIARY TRACT (HCC): Primary | ICD-10-CM

## 2021-02-18 LAB
ANION GAP BLD CALC-SCNC: 16 MMOL/L (ref 10–20)
BASOPHILS # BLD: 0.1 K/UL (ref 0–0.1)
BASOPHILS NFR BLD: 2 % (ref 0–1)
BUN BLD-MCNC: 12 MG/DL (ref 9–20)
CA-I BLD-MCNC: 1.18 MMOL/L (ref 1.12–1.32)
CHLORIDE BLD-SCNC: 100 MMOL/L (ref 98–107)
CO2 BLD-SCNC: 25 MMOL/L (ref 21–32)
CREAT BLD-MCNC: 0.6 MG/DL (ref 0.6–1.3)
DIFFERENTIAL METHOD BLD: ABNORMAL
EOSINOPHIL # BLD: 0.1 K/UL (ref 0–0.4)
EOSINOPHIL NFR BLD: 4 % (ref 0–7)
ERYTHROCYTE [DISTWIDTH] IN BLOOD BY AUTOMATED COUNT: 13.5 % (ref 11.5–14.5)
GLUCOSE BLD-MCNC: 136 MG/DL (ref 65–100)
HCT VFR BLD AUTO: 33.5 % (ref 36.6–50.3)
HCT VFR BLD CALC: 32 % (ref 36.6–50.3)
HGB BLD-MCNC: 11 G/DL (ref 12.1–17)
IMM GRANULOCYTES # BLD AUTO: 0 K/UL (ref 0–0.04)
IMM GRANULOCYTES NFR BLD AUTO: 0 % (ref 0–0.5)
LYMPHOCYTES # BLD: 0.5 K/UL (ref 0.8–3.5)
LYMPHOCYTES NFR BLD: 17 % (ref 12–49)
MAGNESIUM SERPL-MCNC: 2.2 MG/DL (ref 1.6–2.4)
MCH RBC QN AUTO: 29.5 PG (ref 26–34)
MCHC RBC AUTO-ENTMCNC: 32.8 G/DL (ref 30–36.5)
MCV RBC AUTO: 89.8 FL (ref 80–99)
METAMYELOCYTES NFR BLD MANUAL: 1 %
MONOCYTES # BLD: 0.2 K/UL (ref 0–1)
MONOCYTES NFR BLD: 7 % (ref 5–13)
MYELOCYTES NFR BLD MANUAL: 7 %
NEUTS BAND NFR BLD MANUAL: 1 %
NEUTS SEG # BLD: 1.8 K/UL (ref 1.8–8)
NEUTS SEG NFR BLD: 61 % (ref 32–75)
NRBC # BLD: 0 K/UL (ref 0–0.01)
NRBC BLD-RTO: 0 PER 100 WBC
PLATELET # BLD AUTO: 230 K/UL (ref 150–400)
PMV BLD AUTO: 9.6 FL (ref 8.9–12.9)
POTASSIUM BLD-SCNC: 4 MMOL/L (ref 3.5–5.1)
RBC # BLD AUTO: 3.73 M/UL (ref 4.1–5.7)
RBC MORPH BLD: ABNORMAL
SERVICE CMNT-IMP: ABNORMAL
SODIUM BLD-SCNC: 135 MMOL/L (ref 136–145)
WBC # BLD AUTO: 2.9 K/UL (ref 4.1–11.1)

## 2021-02-18 PROCEDURE — 99215 OFFICE O/P EST HI 40 MIN: CPT | Performed by: INTERNAL MEDICINE

## 2021-02-18 PROCEDURE — 74011250636 HC RX REV CODE- 250/636: Performed by: INTERNAL MEDICINE

## 2021-02-18 PROCEDURE — 96417 CHEMO IV INFUS EACH ADDL SEQ: CPT

## 2021-02-18 PROCEDURE — 36415 COLL VENOUS BLD VENIPUNCTURE: CPT

## 2021-02-18 PROCEDURE — 80047 BASIC METABLC PNL IONIZED CA: CPT

## 2021-02-18 PROCEDURE — 85025 COMPLETE CBC W/AUTO DIFF WBC: CPT

## 2021-02-18 PROCEDURE — 96413 CHEMO IV INFUSION 1 HR: CPT

## 2021-02-18 PROCEDURE — 96375 TX/PRO/DX INJ NEW DRUG ADDON: CPT

## 2021-02-18 PROCEDURE — 83735 ASSAY OF MAGNESIUM: CPT

## 2021-02-18 PROCEDURE — 77030012965 HC NDL HUBR BBMI -A

## 2021-02-18 PROCEDURE — 74011000250 HC RX REV CODE- 250: Performed by: INTERNAL MEDICINE

## 2021-02-18 PROCEDURE — 74011000258 HC RX REV CODE- 258: Performed by: INTERNAL MEDICINE

## 2021-02-18 PROCEDURE — 96367 TX/PROPH/DG ADDL SEQ IV INF: CPT

## 2021-02-18 RX ORDER — SODIUM CHLORIDE 9 MG/ML
10 INJECTION INTRAMUSCULAR; INTRAVENOUS; SUBCUTANEOUS AS NEEDED
Status: ACTIVE | OUTPATIENT
Start: 2021-02-18 | End: 2021-02-18

## 2021-02-18 RX ORDER — SODIUM CHLORIDE 9 MG/ML
25 INJECTION, SOLUTION INTRAVENOUS CONTINUOUS
Status: DISCONTINUED | OUTPATIENT
Start: 2021-02-18 | End: 2021-02-19 | Stop reason: HOSPADM

## 2021-02-18 RX ORDER — SODIUM CHLORIDE 0.9 % (FLUSH) 0.9 %
10 SYRINGE (ML) INJECTION AS NEEDED
Status: DISPENSED | OUTPATIENT
Start: 2021-02-18 | End: 2021-02-18

## 2021-02-18 RX ORDER — PALONOSETRON 0.05 MG/ML
0.25 INJECTION, SOLUTION INTRAVENOUS ONCE
Status: COMPLETED | OUTPATIENT
Start: 2021-02-18 | End: 2021-02-18

## 2021-02-18 RX ORDER — HEPARIN 100 UNIT/ML
300-500 SYRINGE INTRAVENOUS AS NEEDED
Status: ACTIVE | OUTPATIENT
Start: 2021-02-18 | End: 2021-02-18

## 2021-02-18 RX ADMIN — SODIUM CHLORIDE 10 ML: 9 INJECTION, SOLUTION INTRAMUSCULAR; INTRAVENOUS; SUBCUTANEOUS at 10:15

## 2021-02-18 RX ADMIN — SODIUM CHLORIDE 25 ML/HR: 900 INJECTION, SOLUTION INTRAVENOUS at 12:29

## 2021-02-18 RX ADMIN — POTASSIUM CHLORIDE: 2 INJECTION, SOLUTION, CONCENTRATE INTRAVENOUS at 11:25

## 2021-02-18 RX ADMIN — Medication 10 ML: at 16:25

## 2021-02-18 RX ADMIN — SODIUM CHLORIDE 150 MG: 900 INJECTION, SOLUTION INTRAVENOUS at 12:30

## 2021-02-18 RX ADMIN — DEXAMETHASONE SODIUM PHOSPHATE 12 MG: 4 INJECTION, SOLUTION INTRA-ARTICULAR; INTRALESIONAL; INTRAMUSCULAR; INTRAVENOUS; SOFT TISSUE at 12:55

## 2021-02-18 RX ADMIN — Medication 10 ML: at 10:15

## 2021-02-18 RX ADMIN — PALONOSETRON 0.25 MG: 0.05 INJECTION, SOLUTION INTRAVENOUS at 13:12

## 2021-02-18 RX ADMIN — PACLITAXEL 181 MG: 100 INJECTION, POWDER, LYOPHILIZED, FOR SUSPENSION INTRAVENOUS at 14:00

## 2021-02-18 RX ADMIN — CISPLATIN 45.3 MG: 1 INJECTION INTRAVENOUS at 15:20

## 2021-02-18 RX ADMIN — Medication 500 UNITS: at 16:25

## 2021-02-18 RX ADMIN — GEMCITABINE 1448 MG: 38 INJECTION, SOLUTION INTRAVENOUS at 14:40

## 2021-02-18 NOTE — LETTER
2/18/2021    Patient: Efrain Boxer. YOB: 1955   Date of Visit: 2/18/2021     Cherrie Valencia III, DO  [address]  [facility]  [place] [employer]    Dear Lo Page,      Thank you for referring Mr. Pamella Li to [address] [place] [address] for evaluation. My notes for this consultation are attached. If you have questions, please do not hesitate to call me. I look forward to following your patient along with you.       Sincerely,    Sergio Medina NP

## 2021-02-18 NOTE — PROGRESS NOTES
1000- Pt arrived to [facility] ambulatory in no acute distress for C2D8 Abraxane/Gemzar/Cisplatin. Assessment unremarkable except generalized fatigue. R chest port accessed without issue and positive blood return noted. Patient seen in [facility] by Dr. Mindy Alonso. Patient denied having any symptoms of COVID-19, i.e. SOB, coughing, fever, or generally not feeling well. Also denies having been exposed to COVID-19 recently or having had any recent contact with family/friend that has a pending COVID test.      Labs obtained - CBC w/ diff, Chem8, Magnesium  Recent Results (from the past 12 hour(s))   CBC WITH AUTOMATED DIFF    Collection Time: 02/18/21 10:17 AM   Result Value Ref Range    WBC 2.9 (L) 4.1 - 11.1 K/uL    RBC 3.73 (L) 4.10 - 5.70 M/uL    HGB 11.0 (L) 12.1 - 17.0 g/dL    HCT 33.5 (L) 36.6 - 50.3 %    MCV 89.8 80.0 - 99.0 FL    MCH 29.5 26.0 - 34.0 PG    MCHC 32.8 30.0 - 36.5 g/dL    RDW 13.5 11.5 - 14.5 %    PLATELET 188 500 - 243 K/uL    MPV 9.6 8.9 - 12.9 FL    NRBC 0.0 0  WBC    ABSOLUTE NRBC 0.00 0.00 - 0.01 K/uL    NEUTROPHILS 61 32 - 75 %    BAND NEUTROPHILS 1 %    LYMPHOCYTES 17 12 - 49 %    MONOCYTES 7 5 - 13 %    EOSINOPHILS 4 0 - 7 %    BASOPHILS 2 (H) 0 - 1 %    METAMYELOCYTES 1 %    MYELOCYTES 7 %    IMMATURE GRANULOCYTES 0 0.0 - 0.5 %    ABS. NEUTROPHILS 1.8 1.8 - 8.0 K/UL    ABS. LYMPHOCYTES 0.5 (L) 0.8 - 3.5 K/UL    ABS. MONOCYTES 0.2 0.0 - 1.0 K/UL    ABS. EOSINOPHILS 0.1 0.0 - 0.4 K/UL    ABS. BASOPHILS 0.1 0.0 - 0.1 K/UL    ABS. IMM.  GRANS. 0.0 0.00 - 0.04 K/UL    DF MANUAL      RBC COMMENTS NORMOCYTIC, NORMOCHROMIC     MAGNESIUM    Collection Time: 02/18/21 10:17 AM   Result Value Ref Range    Magnesium 2.2 1.6 - 2.4 mg/dL   POC CHEM8    Collection Time: 02/18/21 10:21 AM   Result Value Ref Range    Calcium, ionized (POC) 1.18 1.12 - 1.32 mmol/L    Sodium (POC) 135 (L) 136 - 145 mmol/L    Potassium (POC) 4.0 3.5 - 5.1 mmol/L    Chloride (POC) 100 98 - 107 mmol/L    CO2 (POC) 25 21 - 32 mmol/L    Anion gap (POC) 16 10 - 20 mmol/L    Glucose (POC) 136 (H) 65 - 100 mg/dL    BUN (POC) 12 9 - 20 mg/dL    Creatinine (POC) 0.6 0.6 - 1.3 mg/dL    GFRAA, POC >60 >60 ml/min/1.73m2    GFRNA, POC >60 >60 ml/min/1.73m2    Hematocrit (POC) 32 (L) 36.6 - 50.3 %    Comment Notified RN or MD immediately by          The following medications administered:  1 L NS w/ 10 mEq KCl + 2 g Mag IV over 60 mins  NS @ KVO  Emend 150 mg IVPB over 20 mins  Decadron 12 mg IVPB over 15 mins  Aloxi 0.25 mg IVP  Abraxane 181 mg IV over 30 mins  Gemzar 1448 mg IV over 30 mins  Cisplatin 45.3 mg IV over 60 mins    4146- Handoff report given to BANDAR Valdez to assume care of patient.

## 2021-02-18 NOTE — PROGRESS NOTES
8000 [facility] Visit Note        [address] [facility] [place] [facility] assumed form [place], [facility].     9829 Tolerated treatment well, no adverse reaction noted. Port flushed and de-accessed. D/Cd from [facility] ambulatory and in no distress accompanied by self.   Next appt 3/5

## 2021-02-18 NOTE — PROGRESS NOTES
[address] [facility] [address], [facility], [address] [place] [address], [address] [place] [address]  [phone]    Follow-up Note      Patient: Tomas Leigh MRN: [id number]  SSN: [id number]    YOB: 1955  Age: 60 y.o. Sex: male        Diagnosis:     1. 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 S/P Pancreatoduodenectomy on  10/06/2017  2. Adjuvant monotherapy with Capecitabine - s/p 6 cycles   (12/4/2017 - 05/2018)  3. SBRT RP node/soft tissue 04/2020  4. Palliative chemotherapy   Cis/[place]/Abraxane - Cycle 2 Day 8     Subjective:      Tomas Leigh is a 60 y.o. male with a diagnosis of extrahepatic cholangiocarcinoma with metastatic to the lungs, bones and retroperitoneal node/soft tissue. He presented to the ED in August 2017 with obstructive jaundice. He was found to have an obstructive lesion in the dital bile duct. The CT showed marked intrahepatic biliary dilation and common bile duct dilation to the level of the mid common bile duct, which ws markedly narrowed. He underwent a stenting procedure followed by spyglass cholangiogram. The brushings revealed a diagnosis of cholangiocarcinoma. He underwent a Whipple procedure on 10/06/2017. He completed 6 cycles of adjuvant Xeloda. PET scan showed increased activity in the soft tissue along the SMA. CT guided biopsy showed local recurrence. He underwent SBRT by Dr. Alex Oliver in April 2020. He was admitted with severe back pain. Repeat CT shows growth of tumor in the L4/L5, lung and RP node/soft tissue. He underwent a CT guided celiac plexus block which has helped the back pain immensely. A biopsy of the L4 vertebrae confirms a diagnosis of metastatic cholangiocarcinoma.      Mr. Derek Hernandez is receiving palliative chemotherapy. Nausea has improved. He is feeling well with no new complaints. Review of Systems:    Constitutional: fatigue  Eyes: negative  Ears, Nose, Mouth, Throat, and Face: negative  Respiratory: negative  Cardiovascular: negative  Gastrointestinal: nausea - improved  Genitourinary:negative  Integument/Breast: negative  Hematologic/Lymphatic: negative  Musculoskeletal: negative   Neurological: negative      Past Medical History:   Diagnosis Date    Aneurysm ([facility] 2008    CEREBRAL    Arrhythmia     Previous a.fib, ablation, NSR now; NO LONGER FOLLOWED BY CARDIOLOGIST.     Autoimmune disease ([facility]     SJOGREN'S    Cancer ([facility] 2020    COMMON BILE DUCT, ADENOCARCINOMA, SPINE    Diabetes ([facility]     NIDDM    Family history of skin cancer     Hypertension     Sepsis ([facility] 2017    STENTING TO BILE DUCT    Status post chemotherapy     Stroke [facility]) 2008    brain aneurysm - no deficits    Sun-damaged skin     Sunburn, blistering      Past Surgical History:   Procedure Laterality Date    HX CHOLECYSTECTOMY      HX GI      COLONOSCOPY, POLYPS (BENIGN)    HX GI  10/06/2017    [facility]     HX GI  2020    WHIPPLE REVISION    HX HEART CATHETERIZATION  2005    Ablation     HX HERNIA REPAIR  12/2020    HERNIA REPAIR    HX ORTHOPAEDIC  2000    Spinal fusion W/ HARDWARE    HX OTHER SURGICAL  11/07/2017    [place] Cath, Dr. Francheska Alicia HX OTHER SURGICAL  01/11/2021    RIGHT IJ PORT-A-CATH PLACEMENT     HX VASCULAR ACCESS  2017    PORTACATH - then removed    IR KYPHOPLASTY LUMBAR  1/12/2021    NEUROLOGICAL PROCEDURE UNLISTED  2005    [place] hole washout from cerebral hemorrhage    ND ABDOMEN SURGERY PROC UNLISTED  10/2017    APRIL      Family History   Problem Relation Age of Onset    Cancer Father         Breast and Colon, MELANOMA    Hypertension Father     Cancer Mother         LEUKEMIA    No Known Problems Sister     Atrial Fibrillation Brother     No Known Problems Sister     No Known Problems Son     No Known Problems Son     Anesth Problems Neg Hx      Social History     Tobacco Use    Smoking status: Never Smoker    Smokeless tobacco: Never Used   Substance Use Topics    Alcohol use: Never     Frequency: Never     Comment: very rare      Prior to Admission medications    Medication Sig Start Date End Date Taking? Authorizing Provider   oxyCODONE ER (OxyCONTIN) 20 mg ER tablet Take 2 Tabs by mouth every twelve (12) hours for 15 days. Max Daily Amount: 80 mg. 2/3/21 2/18/21  Hamzah Okeefe MD   OLANZapine (ZyPREXA) 10 mg tablet Take 1 Tab by mouth See Admin Instructions. Take nightly for 4 days starting the evening of chemotherapy. 1/29/21   Dawit Coulter MD   pantoprazole (PROTONIX) 40 mg tablet TAKE 1 TABLET BY MOUTH EVERY DAY 1/11/21   Hmazah Okeefe MD   lidocaine-prilocaine (EMLA) topical cream Apply  to affected area as needed for Pain. 30-45 min prior to treatments 1/7/21   Dawit Coulter MD   ondansetron (ZOFRAN ODT) 4 mg disintegrating tablet Take 1 Tab by mouth every eight (8) hours as needed for Nausea or Vomiting. 1/7/21   Dawit Coulter MD   prochlorperazine (Compazine) 10 mg tablet Take 0.5 Tabs by mouth every six (6) hours as needed for Nausea or Vomiting for up to 60 days. 1/7/21 3/8/21  Dawit Coulter MD   dexAMETHasone (DECADRON) 1 mg tablet Take 2 tablets by mouth twice daily for 14 days 12/31/20   Jack Viramontes MD   dutasteride (AVODART) 0.5 mg capsule Take 1 Cap by mouth daily. 12/21/20   Elex Grippe B III, DO   Creon 36,000-114,000- 180,000 unit cpDR capsule Take 4 Caps by mouth three (3) times daily (with meals). 2-3 caps each meal and 1 cap for snacks (see additional order) 12/21/20   Shiva Raya Kingdom III, DO   naloxone [facility]) 4 mg/actuation nasal spray Use 1 spray intranasally, then discard. Repeat with new spray every 2 min as needed for opioid overdose symptoms, alternating nostrils.  12/11/20   Kalee Bridges MD   aluminum & magnesium hydroxide-simethicone (Maalox Maximum Strength) 400-400-40 mg/5 mL suspension Take 10 mL by mouth every six (6) hours as needed for Indigestion. Provider, Historical   vitamin A (AQUASOL A) 10,000 unit capsule Take 1 Cap by mouth daily. 10/26/20   Czajkowski, Noe Alpers III, DO   vitamin E (AQUA GEMS) 400 unit capsule Take 1 Cap by mouth daily. 10/26/20   Kanika Nickerson III, DO   empagliflozin (Jardiance) 25 mg tablet Take 1 Tab by mouth nightly. 10/20/20   Sejal Raya III, DO   gabapentin (NEURONTIN) 100 mg capsule Take 3 capsules by mouth twice a day. 10/20/20   Silas Raya III, DO   cyanocobalamin (VITAMIN B12) 1,000 mcg/mL injection INJECT CONTENTS OF ONE VIAL INTRAMUSCULARLY EVERY OTHER WEEK  Patient taking differently: INJECT CONTENTS OF ONE VIAL INTRAMUSCULARLY EVERY OTHER WEEK (FIRST AND 15TH OF EACH MONTH) 10/20/20   Kanika OGDEN III, DO   tamsulosin (FLOMAX) 0.4 mg capsule TAKE ONE CAPSULE BY MOUTH EVERY EVENING 10/20/20   Silas Raya III, DO   ramipriL (ALTACE) 5 mg capsule Take 1 Cap by mouth daily. Patient taking differently: Take 5 mg by mouth nightly. 10/20/20   Silas Raya III, DO   lidocaine (LIDODERM) 5 % 1 Patch by TransDERmal route every twenty-four (24) hours. Apply patch to the affected area for 12 hours a day and remove for 12 hours a day. Patient taking differently: 1 Patch by TransDERmal route daily as needed. Apply patch to the affected area for 12 hours a day and remove for 12 hours a day. 10/15/20   Kanika OGDEN III, DO   loperamide (IMODIUM) 2 mg capsule Take 2-4 mg by mouth as needed for Diarrhea. Give 4 mg after first loose stool. Give an additional 2 mg after each loose stool as needed up to a maximum of 8 doses (16 mg/day). Provider, Historical   lipase-protease-amylase (Creon) 36,000-114,000- 180,000 unit cpDR capsule Take 1 Cap by mouth as needed (for snacks).  2-3 caps each meal and 1 cap for snacks (see additional order)    Provider, Historical   finasteride (PROSCAR) 5 mg tablet TAKE 1 TABLET BY MOUTH EVERY DAY 4/13/20   Provider, Historical   acetaminophen (TYLENOL) 325 mg tablet Take 2 Tabs by mouth every four (4) hours as needed for Pain or Fever (Over the counter medication). 1/2/20   Alciranoelle Sheldonion, NP   sildenafil citrate (VIAGRA) 50 mg tablet Take 1 Tab by mouth as needed (ED). 5/6/19   Eligio Raya Floeagle B III, DO   alpha-D-galactosidase (BEANO PO) Take 1 Tab by mouth two (2) times a day. Other, MD Flynn   cetirizine (ZYRTEC) 10 mg tablet Take 10 mg by mouth nightly. Provider, Historical          Allergies   Allergen Reactions    Shellfish Derived Anaphylaxis     Soft shell crabs    Morphine Nausea and Vomiting    Pcn [Penicillins] Other (comments)     Pt stated \"always been told PCN\"  Pt tolerated other cephalosporins in the past           Objective:     Visit Vitals  /63   Pulse 82   Temp 97.3 °F (36.3 °C)   Resp 18   Ht 5' 8\" (1.727 m)   Wt 148 lb (67.1 kg)   SpO2 99%   BMI 22.50 kg/m²       Pain Scale: 0 - No pain/10  Pain Location:        Physical Exam:     GENERAL: alert, cooperative, no distress, appears stated age  EYE: negative  LYMPHATIC: Cervical, supraclavicular, and axillary nodes normal.   THROAT & NECK: normal and no erythema or exudates noted.    LUNG: clear to auscultation bilaterally  HEART: regular rate and rhythm  ABDOMEN: soft, non-tender  EXTREMITIES:  no edema  SKIN: Normal.  NEUROLOGIC: negative       Physical exam and ROS has been modified from a prior visit to make it relevant and current        Lab Results   Component Value Date/Time    WBC 5.5 02/11/2021 09:16 AM    Hemoglobin (POC) 10.3 (L) 10/06/2017 01:14 PM    HGB 11.2 (L) 02/11/2021 09:16 AM    Hematocrit (POC) 36 (L) 01/29/2021 09:32 AM    HCT 35.2 (L) 02/11/2021 09:16 AM    PLATELET 776 81/36/5585 09:16 AM    MCV 91.9 02/11/2021 09:16 AM       Lab Results   Component Value Date/Time    Sodium 138 02/11/2021 09:16 AM    Potassium 3.6 02/11/2021 09:16 AM    Chloride 106 02/11/2021 09:16 AM    CO2 26 02/11/2021 09:16 AM    Anion gap 6 02/11/2021 09:16 AM    Glucose 129 (H) 02/11/2021 09:16 AM    BUN 13 02/11/2021 09:16 AM    Creatinine 0.71 02/11/2021 09:16 AM    BUN/Creatinine ratio 18 02/11/2021 09:16 AM    GFR est AA >60 02/11/2021 09:16 AM    GFR est non-AA >60 02/11/2021 09:16 AM    Calcium 8.6 02/11/2021 09:16 AM    Bilirubin, total 0.6 02/11/2021 09:16 AM    Alk. phosphatase 249 (H) 02/11/2021 09:16 AM    Protein, total 6.2 (L) 02/11/2021 09:16 AM    Albumin 3.1 (L) 02/11/2021 09:16 AM    Globulin 3.1 02/11/2021 09:16 AM    A-G Ratio 1.0 (L) 02/11/2021 09:16 AM    ALT (SGPT) 40 02/11/2021 09:16 AM    AST (SGOT) 18 02/11/2021 09:16 AM           Assessment:     1. 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 Local recurrence in the para-aortic soft tissue - S/P SBRT     Recent CT shows progression of disease in the retroperitoneum, L4, lungs  The disease is unresectable. Treatment will in a palliative intent with a goal to extend life. Receiving palliative chemotherapy   Cis/[place]/Abraxane - Cycle 2 Day 8    Tolerating treatment   + nausea, fatigue  A detailed system by system evaluation of side effect was performed to assess chemotherapy related toxicity. Blood counts are acceptable. Results reviewed with the patient. 2. Cancer related pain    S/P celiac plexus block - done by Dr. Isabella Baig with significant improvement in the pain      3. Bone metastasis, L4    Nuclear medicine bone scan  Ablation/Kyphoplasty - Dr. Isabella Baig      4.  Nausea, CINV    Aloxi, emend, dexamethasone in [facility]IC  Olanzapine 10 mg po nightly x 4 starting day of chemotherapy      Plan:     > Continue chemotherapy with Cis/[place]/Abraxane  > Continue to follow with palliative medicine  > Continue Olanzapine  > CT Chest Abd Pelv, NM bone scan  > Follow-up in 3 weeks      I performed a history and physical examination of the patient and discussed his management with the NPP. I reviewed the NPP note and agree with the documented findings and plan of care. The patient was seen in conjunction with Ms. Jen Patel. Signed by: Amirah Dukes MD                     February 18, 2021      CC. Tanesha Rocha MD  CC. Wan Alfred MD  CC. Marielle Kevin MD  CC. Michel Reis MD  CC.  Blair Kevin MD

## 2021-02-18 NOTE — ADDENDUM NOTE
Encounter addended by: EZEKIEL SeguraD on: 2/18/2021 12:31 PM   Actions taken: i-Vent created or edited

## 2021-02-19 ENCOUNTER — APPOINTMENT (OUTPATIENT)
Dept: INFUSION THERAPY | Age: 66
End: 2021-02-19
Payer: COMMERCIAL

## 2021-02-19 ENCOUNTER — PATIENT OUTREACH (OUTPATIENT)
Dept: OTHER | Age: 66
End: 2021-02-19

## 2021-02-19 NOTE — PROGRESS NOTES
CM  follow up call. Patient with cholangiocarcinoma -Progressive stage IV disease. HTN, DM, BPH  /  Palliative chemo    Chart review:  Palliative chemo /  OV Onc     Contacted  patient for CM follow up services. Verified  and address for HIPAA security. *  Chemo yesterday/  Feeling good today. - Next run planned for 3/5.     -  CM shares that day after chemo, steroids help to feel better.      - Mr. Adan Heads agrees- he feels the effects more on day 2-3.     -  He is pleased that he is not suffering chemo side effects as bad as expected. - Reviewed that chemo effects are cumulative, but with his history of N/V and pain for so long, he may experience chemo SE less. * Back pain is gone. Able to wean dilaudid    - Weaning support with Palliative Care. - Understands weaning process to avoid rebound pain or withdrawal symptoms. * Wife identified the need for hydration 2 weeks ago/ able to manage with OP infusion. * No power outage with ice storms/ but has generator in place if needed. - Has 4 wheel drive vehicle / no problem getting to the hospital yesterday. * Family using video chat to visit with grandchildren.      - Monitoring closely to avoid infection. * MED CHANGES:   Able to decrease pain meds. * MD APTS:  3/5- next chemo;  - palliative care. Will follow for CM services. Next planned outreach:  Friday 3/19  - check in on chemo. Chart Review:  Onc OV   Treatment:      1. S/P Pancreatoduodenectomy on  10/06/2017  2. Adjuvant monotherapy with Capecitabine - s/p 6 cycles              (2017 - 2018)  3. SBRT RP node/soft tissue 2020  4. Palliative chemotherapy              Cis/[place]/Abraxane - Cycle 2 Day 8     Visit Vitals  /63   Pulse 82   Temp 97.3 °F (36.3 °C)   Resp 18   Ht 5' 8\" (1.727 m)   Wt 148 lb (67.1 kg)   SpO2 99%   BMI 22.50 kg/m²      Pain Scale: 0 - No pain/10  Prognosis: Guarded       1.  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 Blood counts are acceptable. Results reviewed with the patient.     2. Cancer related pain  S/P celiac plexus block - done by Dr. Margarita Mckinney with significant improvement in the pain     3. Bone metastasis, L4  Nuclear medicine bone scan  Ablation/Kyphoplasty - Dr. Margarita Mckinney     4.  Nausea, CINV  Aloxi, emend, dexamethasone in OPIC  Olanzapine 10 mg po nightly x 4 starting day of chemotherapy        Plan:      > Continue chemotherapy with Cis/[place]/Abraxane  > Continue to follow with palliative medicine  > Continue Olanzapine  > CT Chest Abd Pelv, NM bone scan  > Follow-up in 3 weeks

## 2021-02-22 ENCOUNTER — TELEPHONE (OUTPATIENT)
Dept: PALLATIVE CARE | Age: 66
End: 2021-02-22

## 2021-02-22 NOTE — TELEPHONE ENCOUNTER
Calling patient to advise of Virtual Visit with Dr. April Mcknight on  2/24/2021    . A nurse will call you between the hours of 10:00am and 1:30pm.  If you have any questions, please call [phone]. No answer so left voicemail.

## 2021-02-24 ENCOUNTER — TELEPHONE (OUTPATIENT)
Dept: ONCOLOGY | Age: 66
End: 2021-02-24

## 2021-02-24 ENCOUNTER — VIRTUAL VISIT (OUTPATIENT)
Dept: PALLATIVE CARE | Age: 66
End: 2021-02-24

## 2021-02-24 ENCOUNTER — HOSPITAL ENCOUNTER (OUTPATIENT)
Dept: INFUSION THERAPY | Age: 66
Discharge: HOME | End: 2021-02-24
Payer: COMMERCIAL

## 2021-02-24 VITALS
OXYGEN SATURATION: 99 % | HEART RATE: 76 BPM | RESPIRATION RATE: 16 BRPM | BODY MASS INDEX: 22.23 KG/M2 | DIASTOLIC BLOOD PRESSURE: 71 MMHG | WEIGHT: 146.2 LBS | TEMPERATURE: 97.9 F | SYSTOLIC BLOOD PRESSURE: 116 MMHG

## 2021-02-24 DIAGNOSIS — C22.1 CHOLANGIOCARCINOMA OF BILIARY TRACT (HCC): Primary | ICD-10-CM

## 2021-02-24 DIAGNOSIS — C24.9 CHOLANGIOCARCINOMA DETERMINED BY BIOPSY OF BILIARY TRACT (HCC): ICD-10-CM

## 2021-02-24 DIAGNOSIS — C22.1 CHOLANGIOCARCINOMA OF BILIARY TRACT (HCC): ICD-10-CM

## 2021-02-24 DIAGNOSIS — R10.9 INTRACTABLE ABDOMINAL PAIN: ICD-10-CM

## 2021-02-24 LAB
ALBUMIN SERPL-MCNC: 3.2 G/DL (ref 3.5–5)
ALBUMIN/GLOB SERPL: 1.2 {RATIO} (ref 1.1–2.2)
ALP SERPL-CCNC: 216 U/L (ref 45–117)
ALT SERPL-CCNC: 52 U/L (ref 12–78)
ANION GAP SERPL CALC-SCNC: 7 MMOL/L (ref 5–15)
AST SERPL-CCNC: 20 U/L (ref 15–37)
BASOPHILS # BLD: 0.1 K/UL (ref 0–0.1)
BASOPHILS NFR BLD: 2 % (ref 0–1)
BILIRUB SERPL-MCNC: 0.5 MG/DL (ref 0.2–1)
BUN SERPL-MCNC: 12 MG/DL (ref 6–20)
BUN/CREAT SERPL: 15 (ref 12–20)
CALCIUM SERPL-MCNC: 8.4 MG/DL (ref 8.5–10.1)
CHLORIDE SERPL-SCNC: 105 MMOL/L (ref 97–108)
CO2 SERPL-SCNC: 25 MMOL/L (ref 21–32)
CREAT SERPL-MCNC: 0.79 MG/DL (ref 0.7–1.3)
DIFFERENTIAL METHOD BLD: ABNORMAL
EOSINOPHIL # BLD: 0.2 K/UL (ref 0–0.4)
EOSINOPHIL NFR BLD: 5 % (ref 0–7)
ERYTHROCYTE [DISTWIDTH] IN BLOOD BY AUTOMATED COUNT: 13.6 % (ref 11.5–14.5)
GLOBULIN SER CALC-MCNC: 2.7 G/DL (ref 2–4)
GLUCOSE SERPL-MCNC: 166 MG/DL (ref 65–100)
HCT VFR BLD AUTO: 30.3 % (ref 36.6–50.3)
HGB BLD-MCNC: 10 G/DL (ref 12.1–17)
IMM GRANULOCYTES # BLD AUTO: 0 K/UL (ref 0–0.04)
IMM GRANULOCYTES NFR BLD AUTO: 0 % (ref 0–0.5)
LYMPHOCYTES # BLD: 0.3 K/UL (ref 0.8–3.5)
LYMPHOCYTES NFR BLD: 8 % (ref 12–49)
MCH RBC QN AUTO: 29.2 PG (ref 26–34)
MCHC RBC AUTO-ENTMCNC: 33 G/DL (ref 30–36.5)
MCV RBC AUTO: 88.3 FL (ref 80–99)
MONOCYTES # BLD: 0.1 K/UL (ref 0–1)
MONOCYTES NFR BLD: 3 % (ref 5–13)
NEUTS SEG # BLD: 2.5 K/UL (ref 1.8–8)
NEUTS SEG NFR BLD: 82 % (ref 32–75)
NRBC # BLD: 0 K/UL (ref 0–0.01)
NRBC BLD-RTO: 0 PER 100 WBC
PLATELET # BLD AUTO: 107 K/UL (ref 150–400)
PMV BLD AUTO: 10.2 FL (ref 8.9–12.9)
POTASSIUM SERPL-SCNC: 3.9 MMOL/L (ref 3.5–5.1)
PROT SERPL-MCNC: 5.9 G/DL (ref 6.4–8.2)
RBC # BLD AUTO: 3.43 M/UL (ref 4.1–5.7)
RBC MORPH BLD: ABNORMAL
SODIUM SERPL-SCNC: 137 MMOL/L (ref 136–145)
WBC # BLD AUTO: 3.2 K/UL (ref 4.1–11.1)

## 2021-02-24 PROCEDURE — 85025 COMPLETE CBC W/AUTO DIFF WBC: CPT

## 2021-02-24 PROCEDURE — 74011250636 HC RX REV CODE- 250/636: Performed by: INTERNAL MEDICINE

## 2021-02-24 PROCEDURE — 77030012965 HC NDL HUBR BBMI -A

## 2021-02-24 PROCEDURE — 80053 COMPREHEN METABOLIC PANEL: CPT

## 2021-02-24 PROCEDURE — 74011000250 HC RX REV CODE- 250: Performed by: INTERNAL MEDICINE

## 2021-02-24 PROCEDURE — 36415 COLL VENOUS BLD VENIPUNCTURE: CPT

## 2021-02-24 PROCEDURE — 96360 HYDRATION IV INFUSION INIT: CPT

## 2021-02-24 RX ORDER — OXYCODONE HCL 20 MG/1
40 TABLET, FILM COATED, EXTENDED RELEASE ORAL EVERY 12 HOURS
Qty: 120 TAB | Refills: 0 | Status: SHIPPED | OUTPATIENT
Start: 2021-02-24 | End: 2021-03-04 | Stop reason: SDUPTHER

## 2021-02-24 RX ORDER — SODIUM CHLORIDE 0.9 % (FLUSH) 0.9 %
10-40 SYRINGE (ML) INJECTION AS NEEDED
Status: DISCONTINUED | OUTPATIENT
Start: 2021-02-24 | End: 2021-02-25 | Stop reason: HOSPADM

## 2021-02-24 RX ORDER — HEPARIN 100 UNIT/ML
500 SYRINGE INTRAVENOUS AS NEEDED
Status: DISCONTINUED | OUTPATIENT
Start: 2021-02-24 | End: 2021-02-25 | Stop reason: HOSPADM

## 2021-02-24 RX ORDER — HYDROMORPHONE HYDROCHLORIDE 4 MG/1
8 TABLET ORAL
Qty: 180 TAB | Refills: 0 | Status: SHIPPED | OUTPATIENT
Start: 2021-02-24 | End: 2021-04-02 | Stop reason: SDUPTHER

## 2021-02-24 RX ORDER — SODIUM CHLORIDE 9 MG/ML
10 INJECTION INTRAMUSCULAR; INTRAVENOUS; SUBCUTANEOUS AS NEEDED
Status: DISCONTINUED | OUTPATIENT
Start: 2021-02-24 | End: 2021-02-25 | Stop reason: HOSPADM

## 2021-02-24 RX ADMIN — Medication 10 ML: at 15:14

## 2021-02-24 RX ADMIN — SODIUM CHLORIDE 1000 ML: 900 INJECTION, SOLUTION INTRAVENOUS at 15:14

## 2021-02-24 RX ADMIN — SODIUM CHLORIDE 10 ML: 9 INJECTION, SOLUTION INTRAMUSCULAR; INTRAVENOUS; SUBCUTANEOUS at 15:14

## 2021-02-24 RX ADMIN — Medication 10 ML: at 16:17

## 2021-02-24 RX ADMIN — HEPARIN 500 UNITS: 100 SYRINGE at 16:17

## 2021-02-24 NOTE — PROGRESS NOTES
Visit needs to be rescheduled where I can see patient through video visit. Refilled his medications for today. He has scans coming up on March 1st week.   I will see him in the second week of March

## 2021-02-24 NOTE — PROGRESS NOTES
Palliative Medicine Office Visit  Palliative Medicine Nurse Check In  [phone]    Patient Name: Billie Ling. YOB: 1955      Date of Office Visit: 2/24/2021    Patient states: \"  \"    From Check In Sheet (scanned in Media):  Has a medical provider talked with you about cardiopulmonary resuscitation (CPR)? [x] Yes   [] No   [] Unable to obtain    Nurse reminder to complete or update ACP FlowSheet:    Is ACP on the Problem List?    [] Yes    [] No  IF ACP Document is ON FILE; Nurse to place ACP on Problem List     Is there an ACP Note in Chart Review/Note? [] Yes    [] No   If NO: [address] 2/18/2021   Patient's Healthcare Decision Maker is: Named in scanned ACP document   Primary Decision Maker Name -   Primary Decision Maker Phone Number -   Primary Decision Maker Relationship to Patient -   Confirm Advance Directive Yes, on file   Patient Would Like to Complete Advance Directive -   Does the patient have other document types -       Is there anything that we should know about you as a person in order to provide you the best care possible? Have you been to the ER, urgent care clinic since your last visit? [] Yes   [x] No   [] Unable to obtain    Have you been hospitalized since your last visit? [] Yes   [x] No   [] Unable to obtain    Have you seen or consulted any other health care providers outside of the [address] [place] [address] since your last visit?    [] Yes   [x] No   [] Unable to obtain    Functional status (describe):         Last BM:      accessed (date): 2/24/2021    Bottle review (for opioid pain medication):  Medication 1:   Date filled:   Directions:   # filled:   # left:   # pills taking per day:  Last dose taken:    Medication 2:   Date filled:   Directions:   # filled:   # left:   # pills taking per day:  Last dose taken:    Medication 3:   Date filled:   Directions:   # filled:   # left:   # pills taking per day:  Last dose taken:    Medication 4:   Date filled:   Directions:   # filled:   # left:   # pills taking per day:  Last dose taken:

## 2021-02-24 NOTE — PROGRESS NOTES
Pt arrived to [facility] ambulatory in no acute distress at  for hydration/labs. Assessment unremarkable except pt reports several episodes of diarrhea last night- now resolved after taking Immodium. Pt feels very weak and fatigued. R chest port accessed without issue and positive blood return noted. Labs obtained- CBC with diff and CMP. Patient denied having any symptoms of COVID-19, i.e. SOB, coughing, fever, or generally not feeling well. Also denies having been exposed to COVID-19 recently or having had any recent contact with family/friend that has a pending COVID test.    Patient Vitals for the past 12 hrs:   Temp Pulse Resp BP SpO2   02/24/21 1617  76 16 116/71    02/24/21 1518 97.9 °F (36.6 °C) 76 16 120/73 99 %       Recent Results (from the past 12 hour(s))   CBC WITH AUTOMATED DIFF    Collection Time: 02/24/21  3:15 PM   Result Value Ref Range    WBC 3.2 (L) 4.1 - 11.1 K/uL    RBC 3.43 (L) 4.10 - 5.70 M/uL    HGB 10.0 (L) 12.1 - 17.0 g/dL    HCT 30.3 (L) 36.6 - 50.3 %    MCV 88.3 80.0 - 99.0 FL    MCH 29.2 26.0 - 34.0 PG    MCHC 33.0 30.0 - 36.5 g/dL    RDW 13.6 11.5 - 14.5 %    PLATELET 588 (L) 407 - 400 K/uL    MPV 10.2 8.9 - 12.9 FL    NRBC 0.0 0  WBC    ABSOLUTE NRBC 0.00 0.00 - 0.01 K/uL    NEUTROPHILS 82 (H) 32 - 75 %    LYMPHOCYTES 8 (L) 12 - 49 %    MONOCYTES 3 (L) 5 - 13 %    EOSINOPHILS 5 0 - 7 %    BASOPHILS 2 (H) 0 - 1 %    IMMATURE GRANULOCYTES 0 0.0 - 0.5 %    ABS. NEUTROPHILS 2.5 1.8 - 8.0 K/UL    ABS. LYMPHOCYTES 0.3 (L) 0.8 - 3.5 K/UL    ABS. MONOCYTES 0.1 0.0 - 1.0 K/UL    ABS. EOSINOPHILS 0.2 0.0 - 0.4 K/UL    ABS. BASOPHILS 0.1 0.0 - 0.1 K/UL    ABS. IMM.  GRANS. 0.0 0.00 - 0.04 K/UL    DF AUTOMATED      RBC COMMENTS NORMOCYTIC, NORMOCHROMIC     METABOLIC PANEL, COMPREHENSIVE    Collection Time: 02/24/21  3:15 PM   Result Value Ref Range    Sodium 137 136 - 145 mmol/L    Potassium 3.9 3.5 - 5.1 mmol/L    Chloride 105 97 - 108 mmol/L    CO2 25 21 - 32 mmol/L    Anion gap 7 5 - 15 mmol/L    Glucose 166 (H) 65 - 100 mg/dL    BUN 12 6 - 20 MG/DL    Creatinine 0.79 0.70 - 1.30 MG/DL    BUN/Creatinine ratio 15 12 - 20      GFR est AA >60 >60 ml/min/1.73m2    GFR est non-AA >60 >60 ml/min/1.73m2    Calcium 8.4 (L) 8.5 - 10.1 MG/DL    Bilirubin, total 0.5 0.2 - 1.0 MG/DL    ALT (SGPT) 52 12 - 78 U/L    AST (SGOT) 20 15 - 37 U/L    Alk. phosphatase 216 (H) 45 - 117 U/L    Protein, total 5.9 (L) 6.4 - 8.2 g/dL    Albumin 3.2 (L) 3.5 - 5.0 g/dL    Globulin 2.7 2.0 - 4.0 g/dL    A-G Ratio 1.2 1.1 - 2.2         The following medications administered:  NS 1L IV over 1 hour    Pt tolerated treatment well. Port flushed per policy and needle removed, 2x2 and paper tape placed. Pt discharged ambulatory in no acute distress at 1620, accompanied by self. Next appointment 3/5/21 @ 0900.

## 2021-02-24 NOTE — TELEPHONE ENCOUNTER
Patient called and stated he isnt feeling well and wants to know if he can get scheduled today for iv fluids.       [phone]

## 2021-02-24 NOTE — TELEPHONE ENCOUNTER
Returned call to pt. HIPAA verified by two patient identifiers. Pt reports he has had loose stools since start of treatment. Yesterday he had approx. 6-7 completely watery stools. He took imodium 2 tabs at 5pm and then one pill during the night and 2 this morning at 0800 and has not had epsiode since. His BP is 100/60. And his skin turgor is poor, per his wife who is a Nurse. We will bring pt into the [facility] for hydration and labs after his virtual appointment with palliative today. Pt very thankful and understands he may have to wait for the appt. In the [facility].

## 2021-02-28 RX ORDER — PALONOSETRON 0.05 MG/ML
0.25 INJECTION, SOLUTION INTRAVENOUS ONCE
Status: CANCELLED | OUTPATIENT
Start: 2021-03-12 | End: 2021-03-12

## 2021-02-28 RX ORDER — PALONOSETRON 0.05 MG/ML
0.25 INJECTION, SOLUTION INTRAVENOUS ONCE
Status: CANCELLED | OUTPATIENT
Start: 2021-03-25 | End: 2021-03-26

## 2021-02-28 RX ORDER — EPINEPHRINE 1 MG/ML
0.3 INJECTION, SOLUTION, CONCENTRATE INTRAVENOUS AS NEEDED
Status: CANCELLED | OUTPATIENT
Start: 2021-04-01

## 2021-02-28 RX ORDER — ACETAMINOPHEN 325 MG/1
650 TABLET ORAL AS NEEDED
Status: CANCELLED
Start: 2021-03-05

## 2021-02-28 RX ORDER — ACETAMINOPHEN 325 MG/1
650 TABLET ORAL AS NEEDED
Status: CANCELLED
Start: 2021-03-25

## 2021-02-28 RX ORDER — ONDANSETRON 2 MG/ML
8 INJECTION INTRAMUSCULAR; INTRAVENOUS AS NEEDED
Status: CANCELLED | OUTPATIENT
Start: 2021-04-01

## 2021-02-28 RX ORDER — DIPHENHYDRAMINE HYDROCHLORIDE 50 MG/ML
25 INJECTION, SOLUTION INTRAMUSCULAR; INTRAVENOUS AS NEEDED
Status: CANCELLED
Start: 2021-03-05

## 2021-02-28 RX ORDER — ACETAMINOPHEN 325 MG/1
650 TABLET ORAL AS NEEDED
Status: CANCELLED
Start: 2021-03-12

## 2021-02-28 RX ORDER — ALBUTEROL SULFATE 0.83 MG/ML
2.5 SOLUTION RESPIRATORY (INHALATION) AS NEEDED
Status: CANCELLED
Start: 2021-03-05

## 2021-02-28 RX ORDER — EPINEPHRINE 1 MG/ML
0.3 INJECTION, SOLUTION, CONCENTRATE INTRAVENOUS AS NEEDED
Status: CANCELLED | OUTPATIENT
Start: 2021-03-05

## 2021-02-28 RX ORDER — HYDROCORTISONE SODIUM SUCCINATE 100 MG/2ML
100 INJECTION, POWDER, FOR SOLUTION INTRAMUSCULAR; INTRAVENOUS AS NEEDED
Status: CANCELLED | OUTPATIENT
Start: 2021-03-25

## 2021-02-28 RX ORDER — DIPHENHYDRAMINE HYDROCHLORIDE 50 MG/ML
50 INJECTION, SOLUTION INTRAMUSCULAR; INTRAVENOUS AS NEEDED
Status: CANCELLED
Start: 2021-04-01

## 2021-02-28 RX ORDER — SODIUM CHLORIDE 0.9 % (FLUSH) 0.9 %
10 SYRINGE (ML) INJECTION AS NEEDED
Status: CANCELLED | OUTPATIENT
Start: 2021-03-12 | End: 2021-03-12

## 2021-02-28 RX ORDER — SODIUM CHLORIDE 9 MG/ML
25 INJECTION, SOLUTION INTRAVENOUS CONTINUOUS
Status: CANCELLED | OUTPATIENT
Start: 2021-03-12 | End: 2021-03-12

## 2021-02-28 RX ORDER — EPINEPHRINE 1 MG/ML
0.3 INJECTION, SOLUTION, CONCENTRATE INTRAVENOUS AS NEEDED
Status: CANCELLED | OUTPATIENT
Start: 2021-03-12

## 2021-02-28 RX ORDER — ALBUTEROL SULFATE 0.83 MG/ML
2.5 SOLUTION RESPIRATORY (INHALATION) AS NEEDED
Status: CANCELLED
Start: 2021-03-12

## 2021-02-28 RX ORDER — HEPARIN 100 UNIT/ML
300-500 SYRINGE INTRAVENOUS AS NEEDED
Status: CANCELLED
Start: 2021-03-25

## 2021-02-28 RX ORDER — ONDANSETRON 2 MG/ML
8 INJECTION INTRAMUSCULAR; INTRAVENOUS AS NEEDED
Status: CANCELLED | OUTPATIENT
Start: 2021-03-05

## 2021-02-28 RX ORDER — HYDROCORTISONE SODIUM SUCCINATE 100 MG/2ML
100 INJECTION, POWDER, FOR SOLUTION INTRAMUSCULAR; INTRAVENOUS AS NEEDED
Status: CANCELLED | OUTPATIENT
Start: 2021-04-01

## 2021-02-28 RX ORDER — SODIUM CHLORIDE 0.9 % (FLUSH) 0.9 %
10 SYRINGE (ML) INJECTION AS NEEDED
Status: CANCELLED | OUTPATIENT
Start: 2021-03-25 | End: 2021-03-25

## 2021-02-28 RX ORDER — ALBUTEROL SULFATE 0.83 MG/ML
2.5 SOLUTION RESPIRATORY (INHALATION) AS NEEDED
Status: CANCELLED
Start: 2021-04-01

## 2021-02-28 RX ORDER — DIPHENHYDRAMINE HYDROCHLORIDE 50 MG/ML
25 INJECTION, SOLUTION INTRAMUSCULAR; INTRAVENOUS AS NEEDED
Status: CANCELLED
Start: 2021-03-12

## 2021-02-28 RX ORDER — ALBUTEROL SULFATE 0.83 MG/ML
2.5 SOLUTION RESPIRATORY (INHALATION) AS NEEDED
Status: CANCELLED
Start: 2021-03-25

## 2021-02-28 RX ORDER — SODIUM CHLORIDE 9 MG/ML
25 INJECTION, SOLUTION INTRAVENOUS CONTINUOUS
Status: CANCELLED | OUTPATIENT
Start: 2021-04-01 | End: 2021-04-01

## 2021-02-28 RX ORDER — SODIUM CHLORIDE 9 MG/ML
10 INJECTION INTRAMUSCULAR; INTRAVENOUS; SUBCUTANEOUS AS NEEDED
Status: CANCELLED | OUTPATIENT
Start: 2021-04-01

## 2021-02-28 RX ORDER — HYDROCORTISONE SODIUM SUCCINATE 100 MG/2ML
100 INJECTION, POWDER, FOR SOLUTION INTRAMUSCULAR; INTRAVENOUS AS NEEDED
Status: CANCELLED | OUTPATIENT
Start: 2021-03-12

## 2021-02-28 RX ORDER — SODIUM CHLORIDE 9 MG/ML
10 INJECTION INTRAMUSCULAR; INTRAVENOUS; SUBCUTANEOUS AS NEEDED
Status: CANCELLED | OUTPATIENT
Start: 2021-03-25

## 2021-02-28 RX ORDER — SODIUM CHLORIDE 9 MG/ML
10 INJECTION INTRAMUSCULAR; INTRAVENOUS; SUBCUTANEOUS AS NEEDED
Status: CANCELLED | OUTPATIENT
Start: 2021-03-12

## 2021-02-28 RX ORDER — SODIUM CHLORIDE 0.9 % (FLUSH) 0.9 %
10 SYRINGE (ML) INJECTION AS NEEDED
Status: CANCELLED | OUTPATIENT
Start: 2021-04-01 | End: 2021-04-01

## 2021-02-28 RX ORDER — ACETAMINOPHEN 325 MG/1
650 TABLET ORAL AS NEEDED
Status: CANCELLED
Start: 2021-04-01

## 2021-02-28 RX ORDER — DIPHENHYDRAMINE HYDROCHLORIDE 50 MG/ML
50 INJECTION, SOLUTION INTRAMUSCULAR; INTRAVENOUS AS NEEDED
Status: CANCELLED
Start: 2021-03-05

## 2021-02-28 RX ORDER — ONDANSETRON 2 MG/ML
8 INJECTION INTRAMUSCULAR; INTRAVENOUS AS NEEDED
Status: CANCELLED | OUTPATIENT
Start: 2021-03-25

## 2021-02-28 RX ORDER — HEPARIN 100 UNIT/ML
300-500 SYRINGE INTRAVENOUS AS NEEDED
Status: CANCELLED
Start: 2021-03-12

## 2021-02-28 RX ORDER — ONDANSETRON 2 MG/ML
8 INJECTION INTRAMUSCULAR; INTRAVENOUS AS NEEDED
Status: CANCELLED | OUTPATIENT
Start: 2021-03-12

## 2021-02-28 RX ORDER — DIPHENHYDRAMINE HYDROCHLORIDE 50 MG/ML
25 INJECTION, SOLUTION INTRAMUSCULAR; INTRAVENOUS AS NEEDED
Status: CANCELLED
Start: 2021-03-25

## 2021-02-28 RX ORDER — EPINEPHRINE 1 MG/ML
0.3 INJECTION, SOLUTION, CONCENTRATE INTRAVENOUS AS NEEDED
Status: CANCELLED | OUTPATIENT
Start: 2021-03-25

## 2021-02-28 RX ORDER — HYDROCORTISONE SODIUM SUCCINATE 100 MG/2ML
100 INJECTION, POWDER, FOR SOLUTION INTRAMUSCULAR; INTRAVENOUS AS NEEDED
Status: CANCELLED | OUTPATIENT
Start: 2021-03-05

## 2021-02-28 RX ORDER — HEPARIN 100 UNIT/ML
300-500 SYRINGE INTRAVENOUS AS NEEDED
Status: CANCELLED
Start: 2021-04-01

## 2021-02-28 RX ORDER — PALONOSETRON 0.05 MG/ML
0.25 INJECTION, SOLUTION INTRAVENOUS ONCE
Status: CANCELLED | OUTPATIENT
Start: 2021-04-01 | End: 2021-04-02

## 2021-02-28 RX ORDER — DIPHENHYDRAMINE HYDROCHLORIDE 50 MG/ML
50 INJECTION, SOLUTION INTRAMUSCULAR; INTRAVENOUS AS NEEDED
Status: CANCELLED
Start: 2021-03-12

## 2021-02-28 RX ORDER — DIPHENHYDRAMINE HYDROCHLORIDE 50 MG/ML
25 INJECTION, SOLUTION INTRAMUSCULAR; INTRAVENOUS AS NEEDED
Status: CANCELLED
Start: 2021-04-01

## 2021-02-28 RX ORDER — SODIUM CHLORIDE 9 MG/ML
25 INJECTION, SOLUTION INTRAVENOUS CONTINUOUS
Status: CANCELLED | OUTPATIENT
Start: 2021-03-25 | End: 2021-03-25

## 2021-02-28 RX ORDER — DIPHENHYDRAMINE HYDROCHLORIDE 50 MG/ML
50 INJECTION, SOLUTION INTRAMUSCULAR; INTRAVENOUS AS NEEDED
Status: CANCELLED
Start: 2021-03-25

## 2021-03-01 ENCOUNTER — HOSPITAL ENCOUNTER (OUTPATIENT)
Dept: NUCLEAR MEDICINE | Age: 66
Discharge: HOME | End: 2021-03-01
Attending: INTERNAL MEDICINE
Payer: COMMERCIAL

## 2021-03-01 ENCOUNTER — HOSPITAL ENCOUNTER (OUTPATIENT)
Dept: CT IMAGING | Age: 66
Discharge: HOME | End: 2021-03-01
Attending: INTERNAL MEDICINE
Payer: COMMERCIAL

## 2021-03-01 DIAGNOSIS — C79.51 METASTATIC CANCER TO BONE (HCC): ICD-10-CM

## 2021-03-01 DIAGNOSIS — C22.1 CHOLANGIOCARCINOMA OF BILIARY TRACT (HCC): ICD-10-CM

## 2021-03-01 PROCEDURE — 74011000636 HC RX REV CODE- 636: Performed by: INTERNAL MEDICINE

## 2021-03-01 PROCEDURE — 78306 BONE IMAGING WHOLE BODY: CPT

## 2021-03-01 PROCEDURE — 74177 CT ABD & PELVIS W/CONTRAST: CPT

## 2021-03-01 PROCEDURE — 71260 CT THORAX DX C+: CPT

## 2021-03-01 PROCEDURE — 74011000255 HC RX REV CODE- 255: Performed by: INTERNAL MEDICINE

## 2021-03-01 RX ORDER — BARIUM SULFATE 20 MG/ML
900 SUSPENSION ORAL
Status: COMPLETED | OUTPATIENT
Start: 2021-03-01 | End: 2021-03-01

## 2021-03-01 RX ADMIN — BARIUM SULFATE 900 ML: 21 SUSPENSION ORAL at 08:12

## 2021-03-01 RX ADMIN — IOPAMIDOL 100 ML: 755 INJECTION, SOLUTION INTRAVENOUS at 08:12

## 2021-03-04 ENCOUNTER — TELEPHONE (OUTPATIENT)
Dept: PALLATIVE CARE | Age: 66
End: 2021-03-04

## 2021-03-04 ENCOUNTER — PATIENT MESSAGE (OUTPATIENT)
Dept: PALLATIVE CARE | Age: 66
End: 2021-03-04

## 2021-03-04 DIAGNOSIS — C22.1 CHOLANGIOCARCINOMA OF BILIARY TRACT (HCC): ICD-10-CM

## 2021-03-04 DIAGNOSIS — R10.9 INTRACTABLE ABDOMINAL PAIN: ICD-10-CM

## 2021-03-04 RX ORDER — OXYCODONE HCL 20 MG/1
40 TABLET, FILM COATED, EXTENDED RELEASE ORAL EVERY 12 HOURS
Qty: 120 TAB | Refills: 0 | Status: SHIPPED | OUTPATIENT
Start: 2021-03-04 | End: 2021-04-02 | Stop reason: SDUPTHER

## 2021-03-04 NOTE — TELEPHONE ENCOUNTER
Triage for Controlled Substance Refill Request    Pain Diagnosis: _Cholangiocarcinoma of biliary tract    Last Outpatient Visit: _2/24/2021    Next Outpatient Visit: _3/10/2021    Reason for refill needed outside of office visit? Pharmacy does not have quantity available. Pharmacy: _[facility] [address]        Medication:Oxycontin 20 mg   Dose and directions:2 tabs by mouth every 12 hours.    Number dispensed:120  Date filled ( or Pharmacy):2/3/2021  #left:     reviewed: _yes     Date of Urine Drug Screen:  _n/a     Opioid Safety Handout given:  _yes     Appropriate for refill:  _yes     Action:  _ yes

## 2021-03-04 NOTE — TELEPHONE ENCOUNTER
The patient's wife states the pharmacy is unable to get Oxycontin ER 20 mg. He is down to 7 pills. She is not sure what to do. Should the medication be switched? Please give her a call back.

## 2021-03-04 NOTE — TELEPHONE ENCOUNTER
The patient's wife called back stating she found name brand Oxycontin at [facility]. She is just going to pay $100.00  For this month because it is too much of a hassle.  # [phone]

## 2021-03-05 ENCOUNTER — OFFICE VISIT (OUTPATIENT)
Dept: ONCOLOGY | Age: 66
End: 2021-03-05

## 2021-03-05 ENCOUNTER — HOSPITAL ENCOUNTER (OUTPATIENT)
Dept: INFUSION THERAPY | Age: 66
Discharge: HOME | End: 2021-03-05
Payer: COMMERCIAL

## 2021-03-05 VITALS
WEIGHT: 147 LBS | SYSTOLIC BLOOD PRESSURE: 121 MMHG | TEMPERATURE: 98.1 F | HEART RATE: 85 BPM | HEIGHT: 68 IN | BODY MASS INDEX: 22.28 KG/M2 | OXYGEN SATURATION: 98 % | RESPIRATION RATE: 16 BRPM | DIASTOLIC BLOOD PRESSURE: 67 MMHG

## 2021-03-05 VITALS
HEIGHT: 68 IN | BODY MASS INDEX: 22.32 KG/M2 | DIASTOLIC BLOOD PRESSURE: 77 MMHG | RESPIRATION RATE: 16 BRPM | TEMPERATURE: 98.1 F | OXYGEN SATURATION: 98 % | HEART RATE: 73 BPM | SYSTOLIC BLOOD PRESSURE: 130 MMHG | WEIGHT: 147.3 LBS

## 2021-03-05 DIAGNOSIS — C22.1 CHOLANGIOCARCINOMA OF BILIARY TRACT (HCC): ICD-10-CM

## 2021-03-05 DIAGNOSIS — R19.7 DIARRHEA DUE TO MALABSORPTION: ICD-10-CM

## 2021-03-05 DIAGNOSIS — G62.9 SENSORY NEUROPATHY: ICD-10-CM

## 2021-03-05 DIAGNOSIS — C24.9 CHOLANGIOCARCINOMA DETERMINED BY BIOPSY OF BILIARY TRACT (HCC): Primary | ICD-10-CM

## 2021-03-05 DIAGNOSIS — C79.51 METASTATIC CANCER TO BONE (HCC): Primary | ICD-10-CM

## 2021-03-05 DIAGNOSIS — K90.9 DIARRHEA DUE TO MALABSORPTION: ICD-10-CM

## 2021-03-05 LAB
ALBUMIN SERPL-MCNC: 3.3 G/DL (ref 3.5–5)
ALBUMIN/GLOB SERPL: 1.3 {RATIO} (ref 1.1–2.2)
ALP SERPL-CCNC: 267 U/L (ref 45–117)
ALT SERPL-CCNC: 52 U/L (ref 12–78)
ANION GAP SERPL CALC-SCNC: 6 MMOL/L (ref 5–15)
AST SERPL-CCNC: 34 U/L (ref 15–37)
BASO+EOS+MONOS # BLD AUTO: 0.6 K/UL (ref 0.2–1.2)
BASO+EOS+MONOS NFR BLD AUTO: 14 % (ref 3.2–16.9)
BILIRUB SERPL-MCNC: 1.2 MG/DL (ref 0.2–1)
BUN SERPL-MCNC: 14 MG/DL (ref 6–20)
BUN/CREAT SERPL: 18 (ref 12–20)
CALCIUM SERPL-MCNC: 8.6 MG/DL (ref 8.5–10.1)
CHLORIDE SERPL-SCNC: 107 MMOL/L (ref 97–108)
CO2 SERPL-SCNC: 26 MMOL/L (ref 21–32)
CREAT SERPL-MCNC: 0.76 MG/DL (ref 0.7–1.3)
DIFFERENTIAL METHOD BLD: ABNORMAL
ERYTHROCYTE [DISTWIDTH] IN BLOOD BY AUTOMATED COUNT: 17.1 % (ref 11.8–15.8)
GLOBULIN SER CALC-MCNC: 2.6 G/DL (ref 2–4)
GLUCOSE SERPL-MCNC: 163 MG/DL (ref 65–100)
HCT VFR BLD AUTO: 34.5 % (ref 36.6–50.3)
HGB BLD-MCNC: 11.2 G/DL (ref 12.1–17)
LYMPHOCYTES # BLD: 0.5 K/UL (ref 0.8–3.5)
LYMPHOCYTES NFR BLD: 13 % (ref 12–49)
MAGNESIUM SERPL-MCNC: 2.3 MG/DL (ref 1.6–2.4)
MCH RBC QN AUTO: 29.5 PG (ref 26–34)
MCHC RBC AUTO-ENTMCNC: 32.5 G/DL (ref 30–36.5)
MCV RBC AUTO: 90.8 FL (ref 80–99)
NEUTS SEG # BLD: 2.8 K/UL (ref 1.8–8)
NEUTS SEG NFR BLD: 73 % (ref 32–75)
PLATELET # BLD AUTO: 168 K/UL (ref 150–400)
POTASSIUM SERPL-SCNC: 3.8 MMOL/L (ref 3.5–5.1)
PROT SERPL-MCNC: 5.9 G/DL (ref 6.4–8.2)
RBC # BLD AUTO: 3.8 M/UL (ref 4.1–5.7)
SODIUM SERPL-SCNC: 139 MMOL/L (ref 136–145)
WBC # BLD AUTO: 3.9 K/UL (ref 4.1–11.1)

## 2021-03-05 PROCEDURE — 74011000258 HC RX REV CODE- 258: Performed by: INTERNAL MEDICINE

## 2021-03-05 PROCEDURE — 74011250636 HC RX REV CODE- 250/636: Performed by: INTERNAL MEDICINE

## 2021-03-05 PROCEDURE — 99215 OFFICE O/P EST HI 40 MIN: CPT | Performed by: INTERNAL MEDICINE

## 2021-03-05 PROCEDURE — 77030012965 HC NDL HUBR BBMI -A

## 2021-03-05 PROCEDURE — 96413 CHEMO IV INFUSION 1 HR: CPT

## 2021-03-05 PROCEDURE — 83735 ASSAY OF MAGNESIUM: CPT

## 2021-03-05 PROCEDURE — 36415 COLL VENOUS BLD VENIPUNCTURE: CPT

## 2021-03-05 PROCEDURE — 96375 TX/PRO/DX INJ NEW DRUG ADDON: CPT

## 2021-03-05 PROCEDURE — 85025 COMPLETE CBC W/AUTO DIFF WBC: CPT

## 2021-03-05 PROCEDURE — 74011000250 HC RX REV CODE- 250: Performed by: INTERNAL MEDICINE

## 2021-03-05 PROCEDURE — 80053 COMPREHEN METABOLIC PANEL: CPT

## 2021-03-05 PROCEDURE — 96417 CHEMO IV INFUS EACH ADDL SEQ: CPT

## 2021-03-05 PROCEDURE — 96367 TX/PROPH/DG ADDL SEQ IV INF: CPT

## 2021-03-05 RX ORDER — PALONOSETRON 0.05 MG/ML
0.25 INJECTION, SOLUTION INTRAVENOUS ONCE
Status: COMPLETED | OUTPATIENT
Start: 2021-03-05 | End: 2021-03-05

## 2021-03-05 RX ORDER — SODIUM CHLORIDE 9 MG/ML
10 INJECTION INTRAMUSCULAR; INTRAVENOUS; SUBCUTANEOUS AS NEEDED
Status: ACTIVE | OUTPATIENT
Start: 2021-03-05 | End: 2021-03-05

## 2021-03-05 RX ORDER — SODIUM CHLORIDE 9 MG/ML
25 INJECTION, SOLUTION INTRAVENOUS CONTINUOUS
Status: DISPENSED | OUTPATIENT
Start: 2021-03-05 | End: 2021-03-05

## 2021-03-05 RX ORDER — HEPARIN 100 UNIT/ML
300-500 SYRINGE INTRAVENOUS AS NEEDED
Status: ACTIVE | OUTPATIENT
Start: 2021-03-05 | End: 2021-03-05

## 2021-03-05 RX ORDER — SODIUM CHLORIDE 0.9 % (FLUSH) 0.9 %
10 SYRINGE (ML) INJECTION AS NEEDED
Status: DISPENSED | OUTPATIENT
Start: 2021-03-05 | End: 2021-03-05

## 2021-03-05 RX ADMIN — DEXAMETHASONE SODIUM PHOSPHATE 12 MG: 4 INJECTION, SOLUTION INTRA-ARTICULAR; INTRALESIONAL; INTRAMUSCULAR; INTRAVENOUS; SOFT TISSUE at 12:10

## 2021-03-05 RX ADMIN — SODIUM CHLORIDE 10 ML: 9 INJECTION, SOLUTION INTRAMUSCULAR; INTRAVENOUS; SUBCUTANEOUS at 09:10

## 2021-03-05 RX ADMIN — Medication 500 UNITS: at 15:04

## 2021-03-05 RX ADMIN — MAGNESIUM SULFATE HEPTAHYDRATE: 500 INJECTION, SOLUTION INTRAMUSCULAR; INTRAVENOUS at 10:40

## 2021-03-05 RX ADMIN — Medication 10 ML: at 15:04

## 2021-03-05 RX ADMIN — CISPLATIN 45.3 MG: 1 INJECTION INTRAVENOUS at 13:55

## 2021-03-05 RX ADMIN — SODIUM CHLORIDE 150 MG: 900 INJECTION, SOLUTION INTRAVENOUS at 11:48

## 2021-03-05 RX ADMIN — SODIUM CHLORIDE 25 ML/HR: 900 INJECTION, SOLUTION INTRAVENOUS at 11:43

## 2021-03-05 RX ADMIN — PALONOSETRON 0.25 MG: 0.05 INJECTION, SOLUTION INTRAVENOUS at 11:44

## 2021-03-05 RX ADMIN — Medication 10 ML: at 09:10

## 2021-03-05 RX ADMIN — GEMCITABINE HYDROCHLORIDE 1448 MG: 1 INJECTION, SOLUTION INTRAVENOUS at 13:10

## 2021-03-05 RX ADMIN — PACLITAXEL 181 MG: 100 INJECTION, POWDER, LYOPHILIZED, FOR SUSPENSION INTRAVENOUS at 12:28

## 2021-03-05 NOTE — PROGRESS NOTES
Chelle Peters is a 60 y.o. male here for follow up for cholangiocarcinoma. Treatment today:  Cycle 3 Day 1 Nab-Paclitaxel + Gemcitabine + Cisplatin  He had scans done 3/1/21.    1. Have you been to the ER, urgent care clinic since your last visit? Hospitalized since your last visit? no    2. Have you seen or consulted any other health care providers outside of the [address] [place] [address] since your last visit? Include any pap smears or colon screening. no    Pt states he had diarrhea last night and did not sleep well. Fatigued today.

## 2021-03-05 NOTE — PROGRESS NOTES
905- Pt arrived to [facility] ambulatory in no acute distress for C3D1 Abraxane/Gemzar/Cisplatin. Assessment unremarkable except fatigue and diarrhea which is better with imodium. R chest port accessed without issue and positive blood return noted. Patient to MD office for appt. Patient denied having any symptoms of COVID-19, i.e. SOB, coughing, fever, or generally not feeling well. Also denies having been exposed to COVID-19 recently or having had any recent contact with family/friend that has a pending COVID test.    Labs obtained - CBCap, CMP, Mag  Recent Results (from the past 12 hour(s))   CBC WITH 3 PART DIFF    Collection Time: 03/05/21  9:08 AM   Result Value Ref Range    WBC 3.9 (L) 4.1 - 11.1 K/uL    RBC 3.80 (L) 4.10 - 5.70 M/uL    HGB 11.2 (L) 12.1 - 17.0 g/dL    HCT 34.5 (L) 36.6 - 50.3 %    MCV 90.8 80.0 - 99.0 FL    MCH 29.5 26.0 - 34.0 PG    MCHC 32.5 30.0 - 36.5 g/dL    RDW 17.1 (H) 11.8 - 15.8 %    PLATELET 440 593 - 652 K/uL    NEUTROPHILS 73 32 - 75 %    MIXED CELLS 14 3.2 - 16.9 %    LYMPHOCYTES 13 12 - 49 %    ABS. NEUTROPHILS 2.8 1.8 - 8.0 K/UL    ABS. MIXED CELLS 0.6 0.2 - 1.2 K/uL    ABS. LYMPHOCYTES 0.5 (L) 0.8 - 3.5 K/UL    DF AUTOMATED     METABOLIC PANEL, COMPREHENSIVE    Collection Time: 03/05/21  9:08 AM   Result Value Ref Range    Sodium 139 136 - 145 mmol/L    Potassium 3.8 3.5 - 5.1 mmol/L    Chloride 107 97 - 108 mmol/L    CO2 26 21 - 32 mmol/L    Anion gap 6 5 - 15 mmol/L    Glucose 163 (H) 65 - 100 mg/dL    BUN 14 6 - 20 MG/DL    Creatinine 0.76 0.70 - 1.30 MG/DL    BUN/Creatinine ratio 18 12 - 20      GFR est AA >60 >60 ml/min/1.73m2    GFR est non-AA >60 >60 ml/min/1.73m2    Calcium 8.6 8.5 - 10.1 MG/DL    Bilirubin, total 1.2 (H) 0.2 - 1.0 MG/DL    ALT (SGPT) 52 12 - 78 U/L    AST (SGOT) 34 15 - 37 U/L    Alk.  phosphatase 267 (H) 45 - 117 U/L    Protein, total 5.9 (L) 6.4 - 8.2 g/dL    Albumin 3.3 (L) 3.5 - 5.0 g/dL    Globulin 2.6 2.0 - 4.0 g/dL    A-G Ratio 1.3 1.1 - 2. 2     MAGNESIUM    Collection Time: 03/05/21  9:08 AM   Result Value Ref Range    Magnesium 2.3 1.6 - 2.4 mg/dL       The following medications administered:  1 L NS IV w/ 10 mEq KCl + 2 g Mag IV over 60 mins  NS @ KVO  Aloxi 0.25 mg IVP  Emend 150 mg IVPB over 20 mins  Decadron 12 mg IVPB over 15 mins  Abraxane 181 mg IV over 30 mins  Gemzar 1448 mg IV over 30 mins  Cisplatin 45.3 mg IV over 60 mins    Patient Vitals for the past 12 hrs:   Temp Pulse Resp BP SpO2   03/05/21 1503  73 16 130/77    03/05/21 0906 98.1 °F (36.7 °C) 85 16 121/67 98 %       1510- Pt tolerated treatment well, no adverse reactions noted. Port flushed per policy and de-accessed, 2x2 and tape placed. Pt discharged ambulatory in no acute distress, accompanied by self. Next appointment 3/12/21.

## 2021-03-05 NOTE — LETTER
3/5/2021 Patient: Ever Waters. YOB: 1955 Date of Visit: 3/5/2021 Professor Carine Lester DO 
[address] [address] [address] [employer] Dear Professor Carine Lester DO, Thank you for referring Mr. Delia Hdez to [address] [place] [address] for evaluation. My notes for this consultation are attached. If you have questions, please do not hesitate to call me. I look forward to following your patient along with you.  
 
 
Sincerely, 
 
Abilio Li NP

## 2021-03-05 NOTE — PROGRESS NOTES
Cancer Brandon  at [facility]  [address], [address]  [place] [address] [phone]    Follow-up Note      Patient: Ridge Cristina Jr. MRN: [id number]  SSN: [id number]    YOB: 1955  Age: 65 y.o.  Sex: male        Diagnosis:     1. 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 S/P Pancreatoduodenectomy on  10/06/2017  2. Adjuvant monotherapy with Capecitabine - s/p 6 cycles   (12/4/2017 - 05/2018)  3. SBRT RP node/soft tissue 04/2020  4. Palliative chemotherapy   Cis/[place]/Abraxane - Cycle 3 Day 1     Subjective:      Ridge Cristina Jr. is a 65 y.o. male with a diagnosis of extrahepatic cholangiocarcinoma with metastatic to the lungs, bones and retroperitoneal node/soft tissue. He presented to the ED in August 2017 with obstructive jaundice. He was found to have an obstructive lesion in the dital bile duct. The CT showed marked intrahepatic biliary dilation and common bile duct dilation to the level of the mid common bile duct, which ws markedly narrowed. He underwent a stenting procedure followed by spyglass cholangiogram. The brushings revealed a diagnosis of cholangiocarcinoma. He underwent a Whipple procedure on 10/06/2017. He completed 6 cycles of adjuvant Xeloda.     PET scan showed increased activity in the soft tissue along the SMA. CT guided biopsy showed local recurrence. He underwent SBRT by Dr. Webb in April 2020. He was admitted with severe back pain. Repeat CT shows growth of tumor in the L4/L5, lung and RP node/soft tissue. He underwent a CT guided celiac plexus block which has helped the back pain immensely. A biopsy of the L4 vertebrae confirms a diagnosis of metastatic cholangiocarcinoma.     Mr. Cristina is receiving palliative  chemotherapy. He said he felt great up until yesterday when he started with diarrhea. He said he has been eating well up until last night. Continues to have fatigue. He is here today to discuss his most recent scans. Review of Systems:    Constitutional: fatigue  Eyes: negative  Ears, Nose, Mouth, Throat, and Face: negative  Respiratory: negative  Cardiovascular: negative  Gastrointestinal: diarrhea denies nausea  Genitourinary:negative  Integument/Breast: negative  Hematologic/Lymphatic: negative  Musculoskeletal: negative   Neurological: negative      Past Medical History:   Diagnosis Date    Aneurysm ([address] 2008    CEREBRAL    Arrhythmia     Previous a.fib, ablation, NSR now; NO LONGER FOLLOWED BY CARDIOLOGIST.     Autoimmune disease ([facility]     SJOGREN'S    Cancer ([facility] 2020    COMMON BILE DUCT, ADENOCARCINOMA, SPINE    Diabetes ([facility] [address]     NIDDM    Family history of skin cancer     Hypertension     Sepsis ([facility] 2017    STENTING TO BILE DUCT    Status post chemotherapy     Stroke [facility]) 2008    brain aneurysm - no deficits    Sun-damaged skin     Sunburn, blistering      Past Surgical History:   Procedure Laterality Date    HX CHOLECYSTECTOMY      HX GI      COLONOSCOPY, POLYPS (BENIGN)    [facility]     HX GI  2020    WHIPPLE REVISION    HX HEART CATHETERIZATION  2005    Ablation     HX HERNIA REPAIR  12/2020    HERNIA REPAIR    HX ORTHOPAEDIC  2000    Spinal fusion W/ HARDWARE    HX OTHER SURGICAL  11/07/2017    [place] [facility], Dr. Erwin Bernabe HX OTHER SURGICAL  01/11/2021    RIGHT IJ PORT-A-CATH PLACEMENT     HX VASCULAR ACCESS  2017    PORTACATH - then removed    IR KYPHOPLASTY LUMBAR  1/12/2021    NEUROLOGICAL PROCEDURE UNLISTED  2005    Ting hole washout from cerebral hemorrhage    MA ABDOMEN SURGERY PROC UNLISTED  10/2017    APRIL      Family History   Problem Relation Age of Onset    Cancer Father         Breast and Colon, MELANOMA    Hypertension Father     Cancer Mother         LEUKEMIA    No Known Problems Sister     Atrial Fibrillation Brother     No Known Problems Sister     No Known Problems Son     No Known Problems Son     Anesth Problems Neg Hx      Social History     Tobacco Use    Smoking status: Never Smoker    Smokeless tobacco: Never Used   Substance Use Topics    Alcohol use: Never     Frequency: Never     Comment: very rare      Prior to Admission medications    Medication Sig Start Date End Date Taking? Authorizing Provider   oxyCODONE ER (OxyCONTIN) 20 mg ER tablet Take 2 Tabs by mouth every twelve (12) hours for 15 days. Max Daily Amount: 80 mg. 3/4/21 3/19/21  Jason Canas MD   HYDROmorphone (DILAUDID) 4 mg tablet Take 2 Tabs by mouth every four (4) hours as needed for Pain for up to 15 days. Max Daily Amount: 48 mg. 2/24/21 3/11/21  Jason Canas MD   OLANZapine (ZyPREXA) 10 mg tablet Take 1 Tab by mouth See Admin Instructions. Take nightly for 4 days starting the evening of chemotherapy. 1/29/21   Panfilo Whalen MD   pantoprazole (PROTONIX) 40 mg tablet TAKE 1 TABLET BY MOUTH EVERY DAY 1/11/21   Jason Canas MD   lidocaine-prilocaine (EMLA) topical cream Apply  to affected area as needed for Pain. 30-45 min prior to treatments 1/7/21   Panfilo Whalen MD   ondansetron (ZOFRAN ODT) 4 mg disintegrating tablet Take 1 Tab by mouth every eight (8) hours as needed for Nausea or Vomiting. 1/7/21   Panfilo hWalen MD   prochlorperazine (Compazine) 10 mg tablet Take 0.5 Tabs by mouth every six (6) hours as needed for Nausea or Vomiting for up to 60 days. 1/7/21 3/8/21  Panfilo Whalen MD   dexAMETHasone (DECADRON) 1 mg tablet Take 2 tablets by mouth twice daily for 14 days 12/31/20   Breana Sneed MD   dutasteride (AVODART) 0.5 mg capsule Take 1 Cap by mouth daily. 12/21/20   Latoya OGDEN III, DO   Creon 36,000-114,000- 180,000 unit cpDR capsule Take 4 Caps by mouth three (3) times daily (with meals).  2-3 caps each meal and 1 cap for snacks (see additional order) 12/21/20   Shiva Raya III, DO   naloxone [facility]) 4 mg/actuation nasal spray Use 1 spray intranasally, then discard. Repeat with new spray every 2 min as needed for opioid overdose symptoms, alternating nostrils. 12/11/20   Kalee Bridges MD   aluminum & magnesium hydroxide-simethicone (Maalox Maximum Strength) 400-400-40 mg/5 mL suspension Take 10 mL by mouth every six (6) hours as needed for Indigestion. Provider, Historical   vitamin A (AQUASOL A) 10,000 unit capsule Take 1 Cap by mouth daily. 10/26/20   Shiva Raya III,    vitamin E (AQUA GEMS) 400 unit capsule Take 1 Cap by mouth daily. 10/26/20   Ayla Melo III, DO   empagliflozin (Jardiance) 25 mg tablet Take 1 Tab by mouth nightly. 10/20/20   Mannie Raya III, DO   gabapentin (NEURONTIN) 100 mg capsule Take 3 capsules by mouth twice a day. 10/20/20   iSlas Raya III, DO   cyanocobalamin (VITAMIN B12) 1,000 mcg/mL injection INJECT CONTENTS OF ONE VIAL INTRAMUSCULARLY EVERY OTHER WEEK  Patient taking differently: INJECT CONTENTS OF ONE VIAL INTRAMUSCULARLY EVERY OTHER WEEK (FIRST AND 15TH OF EACH MONTH) 10/20/20   Ayla OGDEN III, DO   tamsulosin (FLOMAX) 0.4 mg capsule TAKE ONE CAPSULE BY MOUTH EVERY EVENING 10/20/20   Silas Raya III, DO   ramipriL (ALTACE) 5 mg capsule Take 1 Cap by mouth daily. Patient taking differently: Take 5 mg by mouth nightly. 10/20/20   Silas Raya III, DO   lidocaine (LIDODERM) 5 % 1 Patch by TransDERmal route every twenty-four (24) hours. Apply patch to the affected area for 12 hours a day and remove for 12 hours a day. Patient taking differently: 1 Patch by TransDERmal route daily as needed. Apply patch to the affected area for 12 hours a day and remove for 12 hours a day.  10/15/20   Ayla OGDEN III, DO   loperamide (IMODIUM) 2 mg capsule Take 2-4 mg by mouth as needed for Diarrhea. Give 4 mg after first loose stool. Give an additional 2 mg after each loose stool as needed up to a maximum of 8 doses (16 mg/day). Provider, Historical   lipase-protease-amylase (Creon) 36,000-114,000- 180,000 unit cpDR capsule Take 1 Cap by mouth as needed (for snacks). 2-3 caps each meal and 1 cap for snacks (see additional order)    Provider, Historical   finasteride (PROSCAR) 5 mg tablet TAKE 1 TABLET BY MOUTH EVERY DAY 4/13/20   Provider, Historical   acetaminophen (TYLENOL) 325 mg tablet Take 2 Tabs by mouth every four (4) hours as needed for Pain or Fever (Over the counter medication). 1/2/20   Abbevillejosette Denney NP   sildenafil citrate (VIAGRA) 50 mg tablet Take 1 Tab by mouth as needed (ED). 5/6/19   Ember Raya III, DO   alpha-D-galactosidase (BEANO PO) Take 1 Tab by mouth two (2) times a day. Other, MD Flynn   cetirizine (ZYRTEC) 10 mg tablet Take 10 mg by mouth nightly. Provider, Historical          Allergies   Allergen Reactions    Shellfish Derived Anaphylaxis     Soft shell crabs    Morphine Nausea and Vomiting    Pcn [Penicillins] Other (comments)     Pt stated \"always been told PCN\"  Pt tolerated other cephalosporins in the past           Objective:     Visit Vitals  /67   Pulse 85   Temp 98.1 °F (36.7 °C)   Resp 16   Ht 5' 8\" (1.727 m)   Wt 147 lb (66.7 kg)   SpO2 98%   BMI 22.35 kg/m²       Pain Scale: 0 - No pain/10  Pain Location:        Physical Exam:     GENERAL: alert, cooperative, no distress, appears stated age  EYE: negative  LYMPHATIC: Cervical, supraclavicular, and axillary nodes normal.   THROAT & NECK: normal and no erythema or exudates noted.    LUNG: clear to auscultation bilaterally  HEART: regular rate and rhythm  ABDOMEN: soft, non-tender  EXTREMITIES:  no edema  SKIN: Normal.  NEUROLOGIC: negative       Physical exam and ROS has been modified from a prior visit to make it relevant and current        Lab Results   Component Value Date/Time WBC 3.9 (L) 03/05/2021 09:08 AM    Hemoglobin (POC) 10.3 (L) 10/06/2017 01:14 PM    HGB 11.2 (L) 03/05/2021 09:08 AM    Hematocrit (POC) 32 (L) 02/18/2021 10:21 AM    HCT 34.5 (L) 03/05/2021 09:08 AM    PLATELET 567 52/30/1481 09:08 AM    MCV 90.8 03/05/2021 09:08 AM       Lab Results   Component Value Date/Time    Sodium 139 03/05/2021 09:08 AM    Potassium 3.8 03/05/2021 09:08 AM    Chloride 107 03/05/2021 09:08 AM    CO2 26 03/05/2021 09:08 AM    Anion gap 6 03/05/2021 09:08 AM    Glucose 163 (H) 03/05/2021 09:08 AM    BUN 14 03/05/2021 09:08 AM    Creatinine 0.76 03/05/2021 09:08 AM    BUN/Creatinine ratio 18 03/05/2021 09:08 AM    GFR est AA >60 03/05/2021 09:08 AM    GFR est non-AA >60 03/05/2021 09:08 AM    Calcium 8.6 03/05/2021 09:08 AM    Bilirubin, total 1.2 (H) 03/05/2021 09:08 AM    Alk. phosphatase 267 (H) 03/05/2021 09:08 AM    Protein, total 5.9 (L) 03/05/2021 09:08 AM    Albumin 3.3 (L) 03/05/2021 09:08 AM    Globulin 2.6 03/05/2021 09:08 AM    A-G Ratio 1.3 03/05/2021 09:08 AM    ALT (SGPT) 52 03/05/2021 09:08 AM    AST (SGOT) 34 03/05/2021 09:08 AM       CT Results (most recent):  Results from Hospital Encounter encounter on 03/01/21   CT ABD PELV W CONT    Narrative EXAM:  CT CHEST W CONT, CT ABD PELV W CONT  INDICATION:  Restaging disease. Intrahepatic bile duct carcinoma  Additional history:  COMPARISON: CT of the chest, abdomen and pelvis, 12/29/2020. CT of the abdomen  and pelvis, 9/20/2020  . TECHNIQUE:   Multislice helical CT was performed from the thoracic inlet to the pubic  symphysis with intravenous contrast administration. Contiguous 5 mm axial images  were reconstructed and lung and soft tissue windows were generated. Coronal and  sagittal reformations were generated. CT dose reduction was achieved through use of a standardized protocol tailored  for this examination and automatic exposure control for dose modulation. Henry Carrillo FINDINGS:  CHEST:  Chest wall/thoracic inlet:  Within normal limits. Thyroid: Within normal limits. Mediastinum/elli: Within normal limits. Heart/vessels: There is a port in the right chest with a right IJ approach  catheter that terminates in the distal SVC. Calcifications in the coronary  arteries. Lungs/Pleura: There are numerous pulmonary nodules, most pronounced in the right  upper lobe which are not significantly changed. A representative nodule in the  posterior, left lower lobe is unchanged (#66, series 4). No significant change  in the appearance of a nodule in the medial most right lower lobe, posterior to  the right atrium. An area of spiculation/scarring in the medial aspect of the  minor fissure is not significant changed. .  ABDOMEN:  Liver: Pneumobilia. Gallbladder/Biliary: Within normal limits. Spleen: Within normal limits. Pancreas: Postsurgical changes of Whipple. Adrenals: Within normal limits. Kidneys: Left perinephric stranding. There is an exophytic, low-attenuation  lesion projecting off the left kidney of likely no clinical significance,  incompletely characterized. Tiny, low-attenuation lesion in the upper pole the  left kidney of likely no clinical significance, too small accurately  characterize. Peritoneum/Mesenteries: Within normal limits. Extraperitoneum: Soft tissue attenuation surrounds the proximal abdominal aorta  at the level of the renal arteries which is similar to comparison, measuring  approximately 5.3 x 4 cm (#70, series 2). Gastrointestinal tract: Tiny amount of oral contrast material in the distal  esophagus suggesting either dysmotility or reflux Postsurgical changes of  Whipple. Diverticulosis in the descending and sigmoid colon. Vascular: Calcifications in the abdominal aortic. There is mass effect upon the  aorta from the soft tissue. The left renal vein appears to be completely  obstructed by the retroperitoneal mass. There is collateral venous drainage in  the retroperitoneum  .   PELVIS:  Extraperitoneum: Within normal limits. Ureters: Within normal limits. Bladder: Within normal limits. Reproductive System: Calcifications in the prostate. .  MSK:   Slight dextroscoliosis of the thoracic spine. Dextroscoliosis of the lumbar  spine. Posterior pedicle screw and sebastian fixation of L5/S1 with slight anterior  listhesis and vertebroplasty and L5. .    Impression 1. No significant change in the appearance of pulmonary nodules. 2. No significant change in the appearance of retroperitoneal adenopathy/mass. There is mass effect upon the vasculature, including occlusion of the left renal  vein with collateral vessels in the retroperitoneum, likely unchanged. 3. Incidental findings as above. I personally reviewed the images. Stable disease. Assessment:     1. 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 Local recurrence in the para-aortic soft tissue - S/P SBRT     Recent CT shows progression of disease in the retroperitoneum, L4, lungs  The disease is unresectable. Treatment will in a palliative intent with a goal to extend life. Receiving palliative chemotherapy   Cis/[place]/Abraxane - Cycle 3 Day 1    Tolerating treatment   + nausea, fatigue  A detailed system by system evaluation of side effect was performed to assess chemotherapy related toxicity. Blood counts are acceptable. Results reviewed with the patient. Repeat CT chest/Abd/pelvis - 3/1/2021 - stable disease  Repeat NM bone scan 3/1/2021 - new are in the pelvis. Although in absence of symptoms I am not certain it is truly progression of disease.        2. Cancer related pain    S/P celiac plexus block - done by Dr. Salena Bardales with significant improvement in the pain      3. Bone metastasis, L4    Nuclear medicine bone scan  Ablation/Kyphoplasty - Dr. Salena Bardales      4. Nausea, CINV    Aloxi, emend, dexamethasone in OPIC  Olanzapine 10 mg po nightly x 4 starting day of chemotherapy      5. Diarrhea from malabsorption    Asked to take Cholestyramine again      6. Chemotherapy related neuropathy    Stable      Plan:     > Continue chemotherapy with Cis/[place]/Abraxane  > Continue to follow with palliative medicine  > Continue Olanzapine  > Follow-up in 3 weeks      I performed a history and physical examination of the patient and discussed his management with the NPP. I reviewed the NPP note and agree with the documented findings and plan of care. The patient was seen in conjunction with Ms. Camacho. Mr. Reyes Quan is a gentleman with metastatic cholangiocarcinoma. He is receiving palliative chemotherapy. He has diarrhea from malabsorption. Disease is stable. Exam looks normal.       Signed by: Shirley Mills MD                     March 5, 2021      CC. Britt Bennett MD  CC. Jacky Arevalo MD  CC. Benny Ceron MD  CC. Trinh Ortega MD  CC.  Donal Carney MD

## 2021-03-10 ENCOUNTER — HOSPITAL ENCOUNTER (OUTPATIENT)
Dept: INFUSION THERAPY | Age: 66
Discharge: HOME | End: 2021-03-10
Payer: COMMERCIAL

## 2021-03-10 ENCOUNTER — TELEPHONE (OUTPATIENT)
Dept: ONCOLOGY | Age: 66
End: 2021-03-10

## 2021-03-10 ENCOUNTER — VIRTUAL VISIT (OUTPATIENT)
Dept: PALLATIVE CARE | Age: 66
End: 2021-03-10
Payer: COMMERCIAL

## 2021-03-10 VITALS
SYSTOLIC BLOOD PRESSURE: 100 MMHG | WEIGHT: 146.7 LBS | DIASTOLIC BLOOD PRESSURE: 63 MMHG | HEART RATE: 71 BPM | BODY MASS INDEX: 22.31 KG/M2 | TEMPERATURE: 97.3 F | RESPIRATION RATE: 18 BRPM

## 2021-03-10 DIAGNOSIS — R63.0 ANOREXIA: ICD-10-CM

## 2021-03-10 DIAGNOSIS — K59.1 FUNCTIONAL DIARRHEA: Primary | ICD-10-CM

## 2021-03-10 DIAGNOSIS — M54.59 INTRACTABLE LOW BACK PAIN: ICD-10-CM

## 2021-03-10 DIAGNOSIS — C22.1 CHOLANGIOCARCINOMA OF BILIARY TRACT (HCC): ICD-10-CM

## 2021-03-10 PROCEDURE — 74011250636 HC RX REV CODE- 250/636: Performed by: NURSE PRACTITIONER

## 2021-03-10 PROCEDURE — 77030012965 HC NDL HUBR BBMI -A

## 2021-03-10 PROCEDURE — 99214 OFFICE O/P EST MOD 30 MIN: CPT | Performed by: INTERNAL MEDICINE

## 2021-03-10 PROCEDURE — 96360 HYDRATION IV INFUSION INIT: CPT

## 2021-03-10 RX ORDER — DIPHENOXYLATE HYDROCHLORIDE AND ATROPINE SULFATE 2.5; .025 MG/1; MG/1
1 TABLET ORAL
Qty: 90 TAB | Refills: 1 | Status: SHIPPED | OUTPATIENT
Start: 2021-03-10 | End: 2021-01-01 | Stop reason: SDUPTHER

## 2021-03-10 RX ADMIN — SODIUM CHLORIDE 1000 ML: 900 INJECTION, SOLUTION INTRAVENOUS at 15:20

## 2021-03-10 NOTE — PROGRESS NOTES
Palliative Medicine Outpatient Services  Lee: [phone] ([phone])    Patient Name: Kim Milian. YOB: 1955    Date of Current Visit: 03/10/21  Location of Current Visit:    [] [facility] Office  [] [facility] Office  [] [address] [place] [address]  [] Home  [x] Other: Virtual synchronous A/V visit    Date of Initial Visit: 4/6/2020  Referral from: Dr. Lauren Haines  Primary Care Physician: Madalyn Montes DO      SUMMARY:   Kim Pillai is a 60y.o. 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 Lauren Haines for management of symptoms and psychosocial support. 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 This is second marriage for both of them. Current treatment-completed RT to the para-aortic mass, pursuing diagnostics with GI physician Dr. Lamont Bumpers for gastroparesis and pancreatic enzymes, has had biliary stents replaced. Status post celiac plexus block and reversal of Whipple procedure on 9/9/2020    Hospitalization at [facility] for uncontrolled pain in December 2020    L5 biopsy, ablation and kyphoplasty on 1/12/2021    Current treatment-on cisplatin plus gemcitabine plus Abraxane       PALLIATIVE DIAGNOSES:       ICD-10-CM ICD-9-CM    1. Functional diarrhea  K59.1 564.5 diphenoxylate-atropine (LomotiL) 2.5-0.025 mg per tablet   2. Intractable low back pain  M54.5 724.2    3. Cholangiocarcinoma of biliary tract (HCC)  C22.1 155.1    4.  Anorexia  R63.0 783.0           PLAN:   Patient Instructions Dear Rey Mcdonald. ,    It was a pleasure seeing you today in your home along with your wife virtually    We will see you again in 4 weeks    If labs or imaging tests have been ordered for you today, please call the office  at [phone] 48 hours after completion to obtain the results. Your stated goal:   - better pain management    Your described symptoms were: Fatigue: 9 Drowsiness: 7   Depression: 0 Pain: 5   Anxiety: 0 Nausea: 0   Anorexia: 0 Dyspnea: 2   Best Well-Bein Constipation: No   Other Problem (Comment): 0       This is the plan we talked about:    1. Acute low back pain from new L5 met status post ablation and kyphoplasty  -Your acute worsening of back pain after the L5 biopsy has improved somewhat and you are no longer taking Dilaudid 8 mg every 3 hours. - You take Dilaudid 4 mg every 6 hours but you take 2 tabs at night.  -Continue OxyContin 40 mg 2 times a day. 2.  Chemo induced diarrhea  -You are recently started on Creon but this has not made a big difference and you continue to have loose watery stools about 10 times per day. You are very dehydrated because of the same. We will arrange for you to have IV fluids tomorrow. Your son who visited you yesterday had some GI symptoms and you are worried you got an infection from him. Give it a day or 2 and after that start taking Lomotil 3 times a day as needed for diarrhea, this is a medicine to help with decreasing the volume of your stool. You have also restarted Imodium and already benefiting from fewer stools today. 3.  Sjogren's disease  -You have a tendency to get dehydrated very quickly. We talked about this in detail today including how to be proactive and drink more than a liter of water per day, have Gatorade and lemonade at home.   Once you start chemotherapy, if you are not able to keep up with your oral fluid intake, please let us know so we can arrange for you to receive IV fluids in the infusion center or even at home if your insurance allows. We talked about the use of dispatch health which is more for urgent care needs only and not for nonurgent needs. It is better to call us so we can arrange the care that you need proactively. 4.  Multivitamin deficiency  -You were found to have severe vitamin A and vitamin D deficiency. You are on replacement therapy now. Please discuss with your primary care doctor about rechecking the levels and continuing prescription level replacement. 5. We completed an AMD naming your wife as Nikko. 6.  Disease progression  -We reviewed your scans and plan for chemotherapy in detail. You are tolerating chemotherapy fairly well so far except for the diarrhea. 7.  Anorexia  -He lost 2 more pounds, you feel weak. You have desire to eat and eating small frequent meals. Please start drinking Ensure and boost at least 2 bottles per day. 8.  I am glad you are receiving your Thange Bane COVID-19 vaccine today. This is what you have shared with us about Advance Care Planning:      Primary Decision Maker: Kristaldavian Rosalio Spouse - [phone]    Secondary Decision Maker: Ridge Cristina III - Child - [phone]    Supplemental (Other) Decision Maker: Kathleen Pabon Child - [phone]  Advance Care Planning 3/10/2021   Patient's [address] is: Named in scanned ACP document   Primary Decision Maker Name -   Primary Decision Maker Phone Number -   Primary Decision Maker Relationship to Patient -   Confirm Advance Directive Yes, on file   Patient Would Like to Complete Advance Directive -   Does the patient have other document types -           The Palliative Medicine Team is here to support you and your family.        Sincerely,      Governor Srinivasa MD and the Palliative Medicine Team       GOALS OF CARE / TREATMENT PREFERENCES:   [====Goals of Care====]  GOALS OF CARE:  Patient / health care proxy stated goals: See Patient Instructions / Summary    TREATMENT PREFERENCES:   Code Status:  [x] Attempt Resuscitation       [] Do Not Attempt Resuscitation    Advance Care Planning:  [x] The Pall Med Interdisciplinary Team has updated the ACP Navigator with Decision Maker and Patient Capacity      Primary Decision MakeChapin Marie - [phone]    Secondary Decision Maker: Ridge Cristina III - Child - [phone]    Supplemental (Other) Decision Maker: Jenn Krishna Child - [phone]  Advance Care Planning 3/10/2021   Patient's Healthcare Decision Maker is: Named in scanned ACP document   Primary Decision Maker Name -   Primary Decision [facility] Phone Number -   Primary Decision Maker Relationship to Patient -   Confirm Advance Directive Yes, on file   Patient Would Like to Complete Advance Directive -   Does the patient have other document types -       Other:  (If patient appropriate for POST, consider using PALLPOST smart phrase here)    The palliative care team has discussed with patient / health care proxy about goals of care / treatment preferences for patient.  [====Goals of Care====]     PRESCRIPTIONS GIVEN:     Medications Ordered Today   Medications    diphenoxylate-atropine (LomotiL) 2.5-0.025 mg per tablet     Sig: Take 1 Tab by mouth three (3) times daily as needed for Diarrhea. Max Daily Amount: 3 Tabs.      Dispense:  90 Tab     Refill:  1           FOLLOW UP:     Future Appointments   Date Time Provider Sloane Roach   3/10/2021  3:00 PM [facility] CHAIR 2 [facility]   3/12/2021  9:00 AM CHAIR 2 [facility]   3/25/2021  9:00 AM CHAIR 2 [facility]   3/25/2021  9:30 AM [facility] Simmons NP [facility]   4/1/2021  9:00 AM CHAIR 2 [facility]   4/8/2021 12:30 PM Roel Mcdonald MD [facility] AMB           PHYSICIANS INVOLVED IN CARE:   Patient Care Team:  Maura Lou DO as PCP - General (Internal Medicine)  Maura Lou DO as PCP - [facility] Empaneled Provider  Lex Russo MD (General Surgery)  Betzy Orozco MD (Gastroenterology)  Lottie Hood MD (Gastroenterology)  Arpita Atwood MD (Hematology and Oncology)  Meghana Frederick MD as Palliative Care (Palliative Medicine)  Jacklyn Nyhan, RN as Benefits Care Manager       HISTORY:   Reviewed patient-completed ESAS and advance care planning form. Reviewed patient record in prescription monitoring program.    CHIEF COMPLAINT: No chief complaint on file. HPI/SUBJECTIVE:    The patient is: [x] Verbal / [] Nonverbal     Patient doing much better overall, pain now better controlled and he is decreased his Dilaudid from 8 mg to 4 mg every 6 hours as needed. He is however continuing to lose weight even though he is eating well. Having a lot of diarrhea, 10-20 stools per day, losing a lot of water. He has been getting hydration every other week. Encouraged eating high-calorie foods including Ensure and boost daily. Discussed diarrhea in detail and came up with treatment plan.  -------    Patient here with his wife. Both are very good historians. Mid upper back pain-much improved since radiation to the periaortic mass. He struggled with pain for several months before it was identified as cancer recurrence. He was started on fentanyl 25 mcg patch which he wants to wean off of. He has made upper and mid zone abdominal pain which comes and goes. He has not noticed any specific pattern to the pain but usually the pain comes on before vomiting or relieves without vomiting. Sometimes, he vomits food that he ate about 12 to 14 hours ago. No constipation. He has 2 liquid bowel movements every day. He is unable to tolerate eggs or honey. He is getting tests done to evaluate his pancreatic enzymes. He has very good support through his wife.     Clinical Pain Assessment (nonverbal scale for nonverbal patients):   [++++ Clinical Pain Assessment++++]  [++++Pain Severity++++]: Pain: 5  [++++Pain Character++++]: cramping  [++++Pain Duration++++]: months  [++++Pain Effect++++]: functional   [++++Pain Factors++++]: none in particular  [++++Pain Frequency++++]: on and off  [++++Pain Location++++]: upper abdomen and mid back  [++++ Clinical Pain Assessment++++]       FUNCTIONAL ASSESSMENT:     Palliative Performance Scale (PPS):  PPS: 70       PSYCHOSOCIAL/SPIRITUAL SCREENING:     Any spiritual / Holiness concerns:  [] Yes /  [x] No    Caregiver Burnout:  [] Yes /  [x] No /  [] No Caregiver Present      Anticipatory grief assessment:   [x] Normal  / [] Maladaptive       ESAS Anxiety: Anxiety: 0    ESAS Depression: Depression: 0       REVIEW OF SYSTEMS:     The following systems were [x] reviewed / [] unable to be reviewed  Systems: constitutional, ears/nose/mouth/throat, respiratory, gastrointestinal, genitourinary, musculoskeletal, integumentary, neurologic, psychiatric, endocrine. Positive findings noted below. Modified ESAS Completed by: provider   Fatigue: 9 Drowsiness: 7   Depression: 0 Pain: 5   Anxiety: 0 Nausea: 0   Anorexia: 0 Dyspnea: 2   Best Well-Bein Constipation: No   Other Problem (Comment): 0          PHYSICAL EXAM:     Wt Readings from Last 3 Encounters:   21 147 lb 4.8 oz (66.8 kg)   21 147 lb (66.7 kg)   21 146 lb 3.2 oz (66.3 kg)     There were no vitals taken for this visit.   Last bowel movement: See Nursing Note    Constitutional    [x] Appears well-developed and well-nourished in no apparent distress    [] Abnormal:  Mental status  [x] Alert and awake  [x] Oriented to person/place/time  [x] Able to follow commands  [] Abnormal:   Eyes  [x] EOM normal   [x] Sclera normal   [x] No visible ocular discharge  [] Abnormal:   HENT  [x] Normocephalic, atraumatic  [x] Mouth/Throat: Moist mucous membranes   [x] External Ears normal  [] Abnormal:  Neck  [x] No visualized mass  [] Abnormal:  Pulmonary/Chest   [x] Respiratory effort normal  [x] No visualized signs of difficulty breathing or respiratory distress  [] Abnormal:  Musculoskeletal  [x] Normal gait with no signs of ataxia  [x] Normal range of motion of neck  [] Abnormal:  Neurological:   [x] No facial asymmetry (Cranial nerve 7 motor function)  [x] No gaze palsy  [] Abnormal:   Skin  [x] No significant exanthematous lesions or discoloration noted on facial skin  [] Abnormal:                                  Psychiatric  [x] Normal affect  [x] No hallucinations  [] Abnormal:    Other pertinent observable physical exam findings:    Due to this being a TeleHealth evaluation, many elements of the physical examination are unable to be assessed. HISTORY:     Past Medical History:   Diagnosis Date    Aneurysm ([facility] 2008    CEREBRAL    Arrhythmia     Previous a.fib, ablation, NSR now; NO LONGER FOLLOWED BY CARDIOLOGIST.     Autoimmune disease ([facility]     SJOGREN'S    Cancer ([facility] 2020    COMMON BILE DUCT, ADENOCARCINOMA, SPINE    Diabetes ([facility]     NIDDM    Family history of skin cancer     Hypertension     Sepsis ([facility] 2017    STENTING TO BILE DUCT    Status post chemotherapy     Stroke [facility]) 2008    brain aneurysm - no deficits    Sun-damaged skin     Sunburn, blistering       Past Surgical History:   Procedure Laterality Date    HX CHOLECYSTECTOMY      HX GI      COLONOSCOPY, POLYPS (BENIGN)    HX GI  10/06/2017    [facility]     HX GI  2020    WHIPPLE REVISION    HX HEART CATHETERIZATION  2005    Ablation     HX HERNIA REPAIR  12/2020    HERNIA REPAIR    HX ORTHOPAEDIC  2000    Spinal fusion W/ HARDWARE    HX OTHER SURGICAL  11/07/2017    [place] Cath, Dr. Marlene Caraballo  01/11/2021    RIGHT IJ PORT-A-CATH PLACEMENT     HX VASCULAR ACCESS  2017    PORTACATH - then removed    IR KYPHOPLASTY LUMBAR  1/12/2021    NEUROLOGICAL PROCEDURE UNLISTED  2005    Ting hole washout from cerebral hemorrhage    ID ABDOMEN SURGERY PROC UNLISTED  10/2017 APRIL      Family History   Problem Relation Age of Onset    Cancer Father         Breast and Colon, MELANOMA    Hypertension Father     Cancer Mother         LEUKEMIA    No Known Problems Sister     Atrial Fibrillation Brother     No Known Problems Sister     No Known Problems Son     No Known Problems Son     Anesth Problems Neg Hx       History reviewed, no pertinent family history. Social History     Tobacco Use    Smoking status: Never Smoker    Smokeless tobacco: Never Used   Substance Use Topics    Alcohol use: Never     Frequency: Never     Comment: very rare     Allergies   Allergen Reactions    Shellfish Derived Anaphylaxis     Soft shell crabs    Morphine Nausea and Vomiting    Pcn [Penicillins] Other (comments)     Pt stated \"always been told PCN\"  Pt tolerated other cephalosporins in the past      Current Outpatient Medications   Medication Sig    diphenoxylate-atropine (LomotiL) 2.5-0.025 mg per tablet Take 1 Tab by mouth three (3) times daily as needed for Diarrhea. Max Daily Amount: 3 Tabs.  oxyCODONE ER (OxyCONTIN) 20 mg ER tablet Take 2 Tabs by mouth every twelve (12) hours for 15 days. Max Daily Amount: 80 mg.    HYDROmorphone (DILAUDID) 4 mg tablet Take 2 Tabs by mouth every four (4) hours as needed for Pain for up to 15 days. Max Daily Amount: 48 mg.    OLANZapine (ZyPREXA) 10 mg tablet Take 1 Tab by mouth See Admin Instructions. Take nightly for 4 days starting the evening of chemotherapy.  pantoprazole (PROTONIX) 40 mg tablet TAKE 1 TABLET BY MOUTH EVERY DAY    lidocaine-prilocaine (EMLA) topical cream Apply  to affected area as needed for Pain. 30-45 min prior to treatments    ondansetron (ZOFRAN ODT) 4 mg disintegrating tablet Take 1 Tab by mouth every eight (8) hours as needed for Nausea or Vomiting.  dexAMETHasone (DECADRON) 1 mg tablet Take 2 tablets by mouth twice daily for 14 days    dutasteride (AVODART) 0.5 mg capsule Take 1 Cap by mouth daily.     Creon 36,000-114,000- 180,000 unit cpDR capsule Take 4 Caps by mouth three (3) times daily (with meals). 2-3 caps each meal and 1 cap for snacks (see additional order)    naloxone (NARCAN) 4 mg/actuation nasal spray Use 1 spray intranasally, then discard. Repeat with new spray every 2 min as needed for opioid overdose symptoms, alternating nostrils.  aluminum & magnesium hydroxide-simethicone (Maalox Maximum Strength) 400-400-40 mg/5 mL suspension Take 10 mL by mouth every six (6) hours as needed for Indigestion.  vitamin A (AQUASOL A) 10,000 unit capsule Take 1 Cap by mouth daily.  vitamin E (AQUA GEMS) 400 unit capsule Take 1 Cap by mouth daily.  empagliflozin (Jardiance) 25 mg tablet Take 1 Tab by mouth nightly.  gabapentin (NEURONTIN) 100 mg capsule Take 3 capsules by mouth twice a day.  cyanocobalamin (VITAMIN B12) 1,000 mcg/mL injection INJECT CONTENTS OF ONE VIAL INTRAMUSCULARLY EVERY OTHER WEEK (Patient taking differently: INJECT CONTENTS OF ONE VIAL INTRAMUSCULARLY EVERY OTHER WEEK (FIRST AND 15TH OF EACH MONTH))    tamsulosin (FLOMAX) 0.4 mg capsule TAKE ONE CAPSULE BY MOUTH EVERY EVENING    ramipriL (ALTACE) 5 mg capsule Take 1 Cap by mouth daily. (Patient taking differently: Take 5 mg by mouth nightly.)    lidocaine (LIDODERM) 5 % 1 Patch by TransDERmal route every twenty-four (24) hours. Apply patch to the affected area for 12 hours a day and remove for 12 hours a day. (Patient taking differently: 1 Patch by TransDERmal route daily as needed. Apply patch to the affected area for 12 hours a day and remove for 12 hours a day.)    loperamide (IMODIUM) 2 mg capsule Take 2-4 mg by mouth as needed for Diarrhea. Give 4 mg after first loose stool. Give an additional 2 mg after each loose stool as needed up to a maximum of 8 doses (16 mg/day).  lipase-protease-amylase (Creon) 36,000-114,000- 180,000 unit cpDR capsule Take 1 Cap by mouth as needed (for snacks).  2-3 caps each meal and 1 cap for snacks (see additional order)    finasteride (PROSCAR) 5 mg tablet TAKE 1 TABLET BY MOUTH EVERY DAY    acetaminophen (TYLENOL) 325 mg tablet Take 2 Tabs by mouth every four (4) hours as needed for Pain or Fever (Over the counter medication).  sildenafil citrate (VIAGRA) 50 mg tablet Take 1 Tab by mouth as needed (ED).  alpha-D-galactosidase (BEANO PO) Take 1 Tab by mouth two (2) times a day.  cetirizine (ZYRTEC) 10 mg tablet Take 10 mg by mouth nightly. No current facility-administered medications for this visit. Facility-Administered Medications Ordered in Other Visits   Medication Dose Route Frequency    sodium chloride 0.9 % bolus infusion 1,000 mL  1,000 mL IntraVENous ONCE          LAB DATA REVIEWED:     Lab Results   Component Value Date/Time    WBC 3.9 (L) 03/05/2021 09:08 AM    HGB 11.2 (L) 03/05/2021 09:08 AM    PLATELET 271 24/68/5034 09:08 AM     Lab Results   Component Value Date/Time    Sodium 139 03/05/2021 09:08 AM    Potassium 3.8 03/05/2021 09:08 AM    Chloride 107 03/05/2021 09:08 AM    CO2 26 03/05/2021 09:08 AM    BUN 14 03/05/2021 09:08 AM    Creatinine 0.76 03/05/2021 09:08 AM    Calcium 8.6 03/05/2021 09:08 AM    Magnesium 2.3 03/05/2021 09:08 AM    Phosphorus 4.0 08/18/2018 04:20 AM      Lab Results   Component Value Date/Time    Alk. phosphatase 267 (H) 03/05/2021 09:08 AM    Protein, total 5.9 (L) 03/05/2021 09:08 AM    Albumin 3.3 (L) 03/05/2021 09:08 AM    Globulin 2.6 03/05/2021 09:08 AM     Lab Results   Component Value Date/Time    INR 1.1 09/09/2020 02:15 AM    Prothrombin time 11.5 (H) 09/09/2020 02:15 AM    aPTT 29.9 09/09/2020 02:15 AM      Lab Results   Component Value Date/Time    Iron 31 (L) 01/02/2020 03:24 AM    TIBC 245 (L) 01/02/2020 03:24 AM    Iron % saturation 13 (L) 01/02/2020 03:24 AM    Ferritin 122 01/02/2020 03:24 AM      CT abd 3/1/21  IMPRESSION  1. No significant change in the appearance of pulmonary nodules.   2. No significant change in the appearance of retroperitoneal adenopathy/mass. There is mass effect upon the vasculature, including occlusion of the left renal  vein with collateral vessels in the retroperitoneum, likely unchanged. 3. Incidental findings as above. CT chest 12/29/2020  IMPRESSION:     1. Numerous tiny pulmonary parenchymal nodules right greater than left,  suspicious for metastatic disease. 2. Focal ill-defined opacity in the right upper lobe which may represent  infiltrate. Follow-up recommended, however. 3. Incidental findings in the upper abdomen described earlier same day. CONTROLLED SUBSTANCES SAFETY ASSESSMENT (IF ON CONTROLLED SUBSTANCES):     Reviewed opioid safety handout:  [x] Yes   [] No  24 hour opioid dose >150mg morphine equivalent/day:  [] Yes   [x] No  Benzodiazepines:  [] Yes   [x] No  Sleep apnea:  [] Yes   [x] No  Urine Toxicology Testing within last 6 months:  [] Yes   [x] No  History of or new aberrant medication taking behaviors:  [] Yes   [x] No  Has Narcan been prescribed [x] Yes   [] No          Total time: 45 minutes   Counseling / coordination time: 40  > 50% counseling / coordination?:   Consent:  He and/or health care decision maker is aware that that he may receive a bill for this telehealth service, depending on his insurance coverage, and has provided verbal consent to proceed: Yes    CPT Codes 77152-97849 for Established Patients may apply to this Telehealth Visit  Pursuant to the emergency declaration under the [facility], [facility] waiver authority and the [employer] Act, this Virtual  Visit was conducted, with patient's consent, to reduce the patient's risk of exposure to COVID-19 and provide continuity of care for an established patient. Services were provided through a video synchronous discussion virtually to substitute for in-person clinic visit.

## 2021-03-10 NOTE — TELEPHONE ENCOUNTER
Patient called because he believes he is dehydrated and would like to see if he can be scheduled for some fluids.

## 2021-03-10 NOTE — TELEPHONE ENCOUNTER
Pt is seeing palliative today. Message sent to [facility] to assess his symptoms when seen today.

## 2021-03-10 NOTE — PROGRESS NOTES
Palliative Medicine Office Visit  Palliative Medicine Nurse Check In  [phone]    Patient Name: Armando Reyes. YOB: 1955      Date of Office Visit: 3/10/2021    Patient states: \"  \"    From Check In Sheet (scanned in Media):  Has a medical provider talked with you about cardiopulmonary resuscitation (CPR)? [x] Yes   [] No   [] Unable to obtain    Nurse reminder to complete or update ACP FlowSheet:    Is ACP on the Problem List?    [x] Yes    [] No  IF ACP Document is ON FILE; Nurse to place ACP on Problem List     Is there an ACP Note in Chart Review/Note? [x] Yes    [] No   If NO: ALERT PROVIDER       Primary Decision MakerCanda Goldmann - [phone]    Secondary Decision Maker: Ridge Cristina III - Child - [phone]    Supplemental (Other) Decision Maker: Mariposa Mcclain Child - [phone]  Advance Care Planning 3/10/2021   Patient's [address] is: Named in scanned ACP document   Primary Decision Maker Name -   Primary Decision Maker Phone Number -   Primary Decision Maker Relationship to Patient -   Confirm Advance Directive Yes, on file   Patient Would Like to Complete Advance Directive -   Does the patient have other document types -       Is there anything that we should know about you as a person in order to provide you the best care possible? Have you been to the ER, urgent care clinic since your last visit? [] Yes   [x] No   [] Unable to obtain    Have you been hospitalized since your last visit? [] Yes   [x] No   [] Unable to obtain    Have you seen or consulted any other health care providers outside of the [address] [place] [address] since your last visit?    [] Yes   [x] No   [] Unable to obtain    Functional status (describe):         Last BM: 3/10/2021     accessed (date): 3/10/2021    Bottle review (for opioid pain medication):  Medication 1:   Date filled:   Directions:   # filled:   # left:   # pills taking per day:  Last dose taken:    Medication 2:   Date filled:   Directions:   # filled:   # left:   # pills taking per day:  Last dose taken:    Medication 3:   Date filled:   Directions:   # filled:   # left:   # pills taking per day:  Last dose taken:    Medication 4:   Date filled:   Directions:   # filled:   # left:   # pills taking per day:  Last dose taken:

## 2021-03-10 NOTE — TELEPHONE ENCOUNTER
Spoke with Leonela Mack RN, she advised Felix Lindsay does think fluids are needed. appt set up for 1 liter today at 3pm.    Called pt. HIPAA verified by two patient identifiers. He is aware.

## 2021-03-10 NOTE — PATIENT INSTRUCTIONS
Dear Aaliyah Hendrix. , 
 
It was a pleasure seeing you today in your home along with your wife virtually We will see you again in 4 weeks If labs or imaging tests have been ordered for you today, please call the office  at [phone] 48 hours after completion to obtain the results. Your stated goal:  
- better pain management Your described symptoms were: Fatigue: 9 Drowsiness: 7 Depression: 0 Pain: 5 Anxiety: 0 Nausea: 0 Anorexia: 0 Dyspnea: 2 Best Well-Bein Constipation: No  
Other Problem (Comment): 0 This is the plan we talked about: 1. Acute low back pain from new L5 met status post ablation and kyphoplasty 
-Your acute worsening of back pain after the L5 biopsy has improved somewhat and you are no longer taking Dilaudid 8 mg every 3 hours. - You take Dilaudid 4 mg every 6 hours but you take 2 tabs at night. 
-Continue OxyContin 40 mg 2 times a day. 2.  Chemo induced diarrhea 
-You are recently started on Creon but this has not made a big difference and you continue to have loose watery stools about 10 times per day. You are very dehydrated because of the same. We will arrange for you to have IV fluids tomorrow. Your son who visited you yesterday had some GI symptoms and you are worried you got an infection from him. Give it a day or 2 and after that start taking Lomotil 3 times a day as needed for diarrhea, this is a medicine to help with decreasing the volume of your stool. You have also restarted Imodium and already benefiting from fewer stools today. 3.  Sjogren's disease 
-You have a tendency to get dehydrated very quickly. We talked about this in detail today including how to be proactive and drink more than a liter of water per day, have Gatorade and lemonade at home.   Once you start chemotherapy, if you are not able to keep up with your oral fluid intake, please let us know so we can arrange for you to receive IV fluids in the infusion center or even at home if your insurance allows. We talked about the use of dispatch health which is more for urgent care needs only and not for nonurgent needs. It is better to call us so we can arrange the care that you need proactively. 4.  Multivitamin deficiency 
-You were found to have severe vitamin A and vitamin D deficiency. You are on replacement therapy now. Please discuss with your primary care doctor about rechecking the levels and continuing prescription level replacement. 5. We completed an AMD naming your wife as Nikko. 6.  Disease progression 
-We reviewed your scans and plan for chemotherapy in detail. You are tolerating chemotherapy fairly well so far except for the diarrhea. 7.  Anorexia 
-He lost 2 more pounds, you feel weak. You have desire to eat and eating small frequent meals. Please start drinking Ensure and boost at least 2 bottles per day. 8.  I am glad you are receiving your [employer] COVID-19 vaccine today. This is what you have shared with us about Advance Care Planning: 
 
  Primary Decision Maker: Stefano Cesar Spouse - [phone] Secondary Decision Maker: Dalton Grijalva - Child - [phone] Supplemental (Other) Decision Maker: Roberto Cristina - Child - [phone] Advance Care Planning 3/10/2021 Patient's Healthcare Decision Maker is: Named in scanned ACP document Primary Decision Maker Name -  
Primary Decision Maker Phone Number -  
Primary Decision Maker Relationship to Patient -  
Confirm Advance Directive Yes, on file Patient Would Like to Complete Advance Directive - Does the patient have other document types - The Palliative Medicine Team is here to support you and your family.   
 
 
Sincerely, 
 
 
Sarah Jones MD and the Palliative Medicine Team

## 2021-03-10 NOTE — PROGRESS NOTES
[facility] Visit Note    Add on Appointment  [phone] Pt arrived at [facility] ambulatory and in no distress for IV hydration. Assessment completed. Pt reports diarrhea and generalized weakness. Port accessed per protocol with positive blood return. Patient Vitals for the past 12 hrs:   Temp Pulse Resp BP   03/10/21 1513 97.3 °F (36.3 °C) 79 18 105/63       Medications received:  Medications Administered     sodium chloride 0.9 % bolus infusion 1,000 mL     Admin Date  03/10/2021 Action  New Bag Dose  1,000 mL Rate  1,000 mL/hr Route  IntraVENous Administered By  Tarah Chavez RN                8782 Tolerated treatment well, no adverse reaction noted. Port flushed and de-accessed. D/Cd from [facility] ambulatory and in no distress accompanied by self.   Next appt 3/12

## 2021-03-11 ENCOUNTER — APPOINTMENT (OUTPATIENT)
Dept: INFUSION THERAPY | Age: 66
End: 2021-03-11

## 2021-03-12 ENCOUNTER — HOSPITAL ENCOUNTER (OUTPATIENT)
Dept: INFUSION THERAPY | Age: 66
Discharge: HOME | End: 2021-03-12
Payer: COMMERCIAL

## 2021-03-12 VITALS
TEMPERATURE: 96.8 F | DIASTOLIC BLOOD PRESSURE: 69 MMHG | RESPIRATION RATE: 16 BRPM | BODY MASS INDEX: 23.17 KG/M2 | OXYGEN SATURATION: 100 % | SYSTOLIC BLOOD PRESSURE: 113 MMHG | HEIGHT: 68 IN | WEIGHT: 152.9 LBS | HEART RATE: 59 BPM

## 2021-03-12 DIAGNOSIS — C24.9 CHOLANGIOCARCINOMA DETERMINED BY BIOPSY OF BILIARY TRACT (HCC): Primary | ICD-10-CM

## 2021-03-12 LAB
ANION GAP BLD CALC-SCNC: 15 MMOL/L (ref 10–20)
BASOPHILS # BLD: 0.1 K/UL (ref 0–0.1)
BASOPHILS NFR BLD: 2 % (ref 0–1)
BUN BLD-MCNC: 19 MG/DL (ref 9–20)
CA-I BLD-MCNC: 1.22 MMOL/L (ref 1.12–1.32)
CHLORIDE BLD-SCNC: 104 MMOL/L (ref 98–107)
CO2 BLD-SCNC: 24 MMOL/L (ref 21–32)
CREAT BLD-MCNC: 0.7 MG/DL (ref 0.6–1.3)
DIFFERENTIAL METHOD BLD: ABNORMAL
EOSINOPHIL # BLD: 0.3 K/UL (ref 0–0.4)
EOSINOPHIL NFR BLD: 8 % (ref 0–7)
ERYTHROCYTE [DISTWIDTH] IN BLOOD BY AUTOMATED COUNT: 16.4 % (ref 11.5–14.5)
GLUCOSE BLD-MCNC: 125 MG/DL (ref 65–100)
HCT VFR BLD AUTO: 28.9 % (ref 36.6–50.3)
HCT VFR BLD CALC: 27 % (ref 36.6–50.3)
HGB BLD-MCNC: 9.2 G/DL (ref 12.1–17)
IMM GRANULOCYTES # BLD AUTO: 0 K/UL (ref 0–0.04)
IMM GRANULOCYTES NFR BLD AUTO: 0 % (ref 0–0.5)
LYMPHOCYTES # BLD: 0.5 K/UL (ref 0.8–3.5)
LYMPHOCYTES NFR BLD: 17 % (ref 12–49)
MAGNESIUM SERPL-MCNC: 1.9 MG/DL (ref 1.6–2.4)
MCH RBC QN AUTO: 29.3 PG (ref 26–34)
MCHC RBC AUTO-ENTMCNC: 31.8 G/DL (ref 30–36.5)
MCV RBC AUTO: 92 FL (ref 80–99)
METAMYELOCYTES NFR BLD MANUAL: 2 %
MONOCYTES # BLD: 0.3 K/UL (ref 0–1)
MONOCYTES NFR BLD: 9 % (ref 5–13)
NEUTS BAND NFR BLD MANUAL: 1 %
NEUTS SEG # BLD: 2 K/UL (ref 1.8–8)
NEUTS SEG NFR BLD: 61 % (ref 32–75)
NRBC # BLD: 0 K/UL (ref 0–0.01)
NRBC BLD-RTO: 0 PER 100 WBC
PLATELET # BLD AUTO: 138 K/UL (ref 150–400)
PMV BLD AUTO: 10.3 FL (ref 8.9–12.9)
POTASSIUM BLD-SCNC: 3.5 MMOL/L (ref 3.5–5.1)
RBC # BLD AUTO: 3.14 M/UL (ref 4.1–5.7)
RBC MORPH BLD: ABNORMAL
SERVICE CMNT-IMP: ABNORMAL
SODIUM BLD-SCNC: 138 MMOL/L (ref 136–145)
WBC # BLD AUTO: 3.2 K/UL (ref 4.1–11.1)

## 2021-03-12 PROCEDURE — 96375 TX/PRO/DX INJ NEW DRUG ADDON: CPT

## 2021-03-12 PROCEDURE — 96417 CHEMO IV INFUS EACH ADDL SEQ: CPT

## 2021-03-12 PROCEDURE — 74011000250 HC RX REV CODE- 250: Performed by: INTERNAL MEDICINE

## 2021-03-12 PROCEDURE — 36415 COLL VENOUS BLD VENIPUNCTURE: CPT

## 2021-03-12 PROCEDURE — 96367 TX/PROPH/DG ADDL SEQ IV INF: CPT

## 2021-03-12 PROCEDURE — 96413 CHEMO IV INFUSION 1 HR: CPT

## 2021-03-12 PROCEDURE — 80047 BASIC METABLC PNL IONIZED CA: CPT

## 2021-03-12 PROCEDURE — 77030012965 HC NDL HUBR BBMI -A

## 2021-03-12 PROCEDURE — 74011250636 HC RX REV CODE- 250/636: Performed by: INTERNAL MEDICINE

## 2021-03-12 PROCEDURE — 83735 ASSAY OF MAGNESIUM: CPT

## 2021-03-12 PROCEDURE — 74011000258 HC RX REV CODE- 258: Performed by: INTERNAL MEDICINE

## 2021-03-12 PROCEDURE — 85025 COMPLETE CBC W/AUTO DIFF WBC: CPT

## 2021-03-12 RX ORDER — PALONOSETRON 0.05 MG/ML
0.25 INJECTION, SOLUTION INTRAVENOUS ONCE
Status: COMPLETED | OUTPATIENT
Start: 2021-03-12 | End: 2021-03-12

## 2021-03-12 RX ORDER — SODIUM CHLORIDE 0.9 % (FLUSH) 0.9 %
10 SYRINGE (ML) INJECTION AS NEEDED
Status: DISPENSED | OUTPATIENT
Start: 2021-03-12 | End: 2021-03-12

## 2021-03-12 RX ORDER — SODIUM CHLORIDE 9 MG/ML
25 INJECTION, SOLUTION INTRAVENOUS CONTINUOUS
Status: DISPENSED | OUTPATIENT
Start: 2021-03-12 | End: 2021-03-12

## 2021-03-12 RX ORDER — SODIUM CHLORIDE 9 MG/ML
10 INJECTION INTRAMUSCULAR; INTRAVENOUS; SUBCUTANEOUS AS NEEDED
Status: DISPENSED | OUTPATIENT
Start: 2021-03-12 | End: 2021-03-12

## 2021-03-12 RX ORDER — HEPARIN 100 UNIT/ML
300-500 SYRINGE INTRAVENOUS AS NEEDED
Status: ACTIVE | OUTPATIENT
Start: 2021-03-12 | End: 2021-03-12

## 2021-03-12 RX ADMIN — SODIUM CHLORIDE 10 ML: 9 INJECTION, SOLUTION INTRAMUSCULAR; INTRAVENOUS; SUBCUTANEOUS at 09:15

## 2021-03-12 RX ADMIN — SODIUM CHLORIDE 1448 MG: 900 INJECTION, SOLUTION INTRAVENOUS at 13:40

## 2021-03-12 RX ADMIN — SODIUM CHLORIDE 150 MG: 900 INJECTION, SOLUTION INTRAVENOUS at 11:25

## 2021-03-12 RX ADMIN — Medication 500 UNITS: at 15:30

## 2021-03-12 RX ADMIN — SODIUM CHLORIDE 25 ML/HR: 900 INJECTION, SOLUTION INTRAVENOUS at 09:36

## 2021-03-12 RX ADMIN — Medication 10 ML: at 15:30

## 2021-03-12 RX ADMIN — DEXAMETHASONE SODIUM PHOSPHATE 12 MG: 4 INJECTION, SOLUTION INTRA-ARTICULAR; INTRALESIONAL; INTRAMUSCULAR; INTRAVENOUS; SOFT TISSUE at 11:25

## 2021-03-12 RX ADMIN — CISPLATIN 45.3 MG: 1 INJECTION INTRAVENOUS at 14:25

## 2021-03-12 RX ADMIN — SODIUM CHLORIDE: 9 INJECTION, SOLUTION INTRAVENOUS at 09:36

## 2021-03-12 RX ADMIN — PALONOSETRON 0.25 MG: 0.05 INJECTION, SOLUTION INTRAVENOUS at 11:20

## 2021-03-12 RX ADMIN — PACLITAXEL 181 MG: 100 INJECTION, POWDER, LYOPHILIZED, FOR SUSPENSION INTRAVENOUS at 12:40

## 2021-03-12 NOTE — PROGRESS NOTES
Outpatient Infusion Center - Chemotherapy Progress Note    0900 - Pt admit to [facility] for Cis/[place]/Abrax in stable condition. Assessment completed. Patient denied having any symptoms of COVID-19, i.e. SOB, coughing, fever, or generally not feeling well. Also denies having been exposed to COVID-19 recently or having had any recent contact with family/friend that has a pending COVID test.     R chest port accessed with positive blood return. Labs drawn per order and sent. Line flushed, clamped, Curos Cap applied to end clave. Chemotherapy Flowsheet 3/12/2021   Cycle C3D8   Date 3/12/2021   Drug / Regimen Cis/[place]/Abrax   Pre Hydration given   Pre Meds given   Notes -        Patient Vitals for the past 12 hrs:   Temp Pulse Resp BP SpO2   03/12/21 1529  (!) 59 16 113/69    03/12/21 0902 96.8 °F (36 °C) 76 16 106/70 100 %        Recent Results (from the past 12 hour(s))   CBC WITH AUTOMATED DIFF    Collection Time: 03/12/21  9:22 AM   Result Value Ref Range    WBC 3.2 (L) 4.1 - 11.1 K/uL    RBC 3.14 (L) 4.10 - 5.70 M/uL    HGB 9.2 (L) 12.1 - 17.0 g/dL    HCT 28.9 (L) 36.6 - 50.3 %    MCV 92.0 80.0 - 99.0 FL    MCH 29.3 26.0 - 34.0 PG    MCHC 31.8 30.0 - 36.5 g/dL    RDW 16.4 (H) 11.5 - 14.5 %    PLATELET 494 (L) 622 - 400 K/uL    MPV 10.3 8.9 - 12.9 FL    NRBC 0.0 0  WBC    ABSOLUTE NRBC 0.00 0.00 - 0.01 K/uL    NEUTROPHILS 61 32 - 75 %    BAND NEUTROPHILS 1 %    LYMPHOCYTES 17 12 - 49 %    MONOCYTES 9 5 - 13 %    EOSINOPHILS 8 (H) 0 - 7 %    BASOPHILS 2 (H) 0 - 1 %    METAMYELOCYTES 2 %    IMMATURE GRANULOCYTES 0 0.0 - 0.5 %    ABS. NEUTROPHILS 2.0 1.8 - 8.0 K/UL    ABS. LYMPHOCYTES 0.5 (L) 0.8 - 3.5 K/UL    ABS. MONOCYTES 0.3 0.0 - 1.0 K/UL    ABS. EOSINOPHILS 0.3 0.0 - 0.4 K/UL    ABS. BASOPHILS 0.1 0.0 - 0.1 K/UL    ABS. IMM.  GRANS. 0.0 0.00 - 0.04 K/UL    DF MANUAL      RBC COMMENTS ANISOCYTOSIS  1+       MAGNESIUM    Collection Time: 03/12/21  9:22 AM   Result Value Ref Range    Magnesium 1.9 1.6 - 2.4 mg/dL   POC CHEM8    Collection Time: 03/12/21  9:27 AM   Result Value Ref Range    Calcium, ionized (POC) 1.22 1.12 - 1.32 mmol/L    Sodium (POC) 138 136 - 145 mmol/L    Potassium (POC) 3.5 3.5 - 5.1 mmol/L    Chloride (POC) 104 98 - 107 mmol/L    CO2 (POC) 24 21 - 32 mmol/L    Anion gap (POC) 15 10 - 20 mmol/L    Glucose (POC) 125 (H) 65 - 100 mg/dL    BUN (POC) 19 9 - 20 mg/dL    Creatinine (POC) 0.7 0.6 - 1.3 mg/dL    GFRAA, POC >60 >60 ml/min/1.73m2    GFRNA, POC >60 >60 ml/min/1.73m2    Hematocrit (POC) 27 (L) 36.6 - 50.3 %    Comment Comment Not Indicated.           Medications:  Medications Administered     0.9% sodium chloride 1,000 mL with potassium chloride 10 mEq, magnesium sulfate 2 g infusion     Admin Date  03/12/2021 Action  Given Dose   Rate  1,000 mL/hr Route  IntraVENous Administered By  Elyce Curling, RN          0.9% sodium chloride infusion     Admin Date  03/12/2021 Action  New Bag Dose  25 mL/hr Rate  25 mL/hr Route  IntraVENous Administered By  Elyce Curling, RN          0.9% sodium chloride injection 10 mL     Admin Date  03/12/2021 Action  Given Dose  10 mL Route  IntraVENous Administered By  Elyce Curling, RN          CISplatin (PLATINOL) 45.3 mg in 0.9% sodium chloride 250 mL, overfill volume 25 mL chemo infusion     Admin Date  03/12/2021 Action  New Bag Dose  45.3 mg Rate  320.3 mL/hr Route  IntraVENous Administered By  Elyce Curling, RN          dexamethasone (DECADRON) 12 mg in 0.9% sodium chloride 50 mL IVPB     Admin Date  03/12/2021 Action  Given Dose  12 mg Route  IntraVENous Administered By  Elyce Curling, RN          fosaprepitant (EMEND) 150 mg in 0.9% sodium chloride 150 mL IVPB     Admin Date  03/12/2021 Action  Given Dose  150 mg Rate  450 mL/hr Route  IntraVENous Administered By  Elyce Curling, ESVIN          gemcitabine (GEMZAR) 1,448 mg in 0.9% sodium chloride 250 mL, overfill volume 25 mL chemo infusion     Admin Date  03/12/2021 Action  New Bag Dose  1,448 mg Rate  626.2 mL/hr Route  IntraVENous Administered By  Jonathon Mahoney RN          heparin (porcine) pf 300-500 Units     Admin Date  03/12/2021 Action  Given Dose  500 Units Route  InterCATHeter Administered By  Jonathon Mahoney RN          PACLitaxel-protein bound (ABRAXANE) 181 mg in 0.9% sodium chloride 36.2 mL chemo infusion     Admin Date  03/12/2021 Action  New Bag Dose  181 mg Rate  72.4 mL/hr Route  IntraVENous Administered By  Jonathon Mahoney RN          palonosetron HCl (ALOXI) injection 0.25 mg     Admin Date  03/12/2021 Action  Given Dose  0.25 mg Route  IntraVENous Administered By  Jonathon Mahoney RN          sodium chloride (NS) flush 10 mL     Admin Date  03/12/2021 Action  Given Dose  10 mL Route  IntraVENous Administered By  Jonathon Mahoney RN                 Pt tolerated treatment well. Port maintained positive blood return throughout treatment, flushed with positive blood return at conclusion, and de-accessed. 1530 - D/c home in no distress.  Pt aware of next [facility] appointment scheduled for:    Future Appointments   Date Time Provider Sloane Roach   3/25/2021  9:00 AM CHAIR 2 [address] REG   3/25/2021  9:30 AM Annel Hodgson NP [facility] BS AMB   4/1/2021  9:00 AM CHAIR 2 [facility] REG   4/8/2021 12:30 PM [facility]

## 2021-03-19 ENCOUNTER — PATIENT OUTREACH (OUTPATIENT)
Dept: OTHER | Age: 66
End: 2021-03-19

## 2021-03-19 NOTE — PROGRESS NOTES
CM  follow up call. Patient with cholangiocarcinoma -Progressive stage IV disease. HTN, DM, BPH  /  Palliative chemo     Chart review:  Last chemo 3/12 ;  Palliative following. Contacted  patient for CM follow up services. Verified  and address for HIPAA security. * Feeling much better- diarrhea stools resolving.      - Stools are more formed and less frequent. - Taking Lomotil once a day. - Gained back weight- fluid loss. * Legs tire quickly. - CM notes last potassium level 3.5- borderline low. - Problems with potassium level in the past/ took supplement at home. - Wife is RN and keeps him in [employer] and [employer]. - Education on Na/K+ pump for muscle function/  Heart.     - Patient will watch for worsening symptoms/ report to MD if needed- or call CM. - CM encouraged patient to report this to his next chemo apt 3/25- may need to order potassium supplement at home. * Missed J&J COVID vaccine due to needing IVF same day. - Will go for next clinic opening to get the shot. * Mr. Daphnie Del Real is very pleased that he is feeling \"Better than I expected. \"     - CM shares his optimistic outlook. * MED CHANGES:  Didn't get J&J -  Needed to have IVF that day. * MD APTS:  3/25- next infusion/chemo;  Palliative   * Patient Concerns:  Tired legs. * CM Concerns:   Drop in potassium. * Education/ Resources. - CM explains Na/K+ used by muscles to contract- reminded patient that the heart is a muscle too and low potassium can be a danger for heart muscle functioning. Will follow for CM services. Next planned outreach:  - following next palliative care apt. Chart Review:   Chemo 3/12  Palliative VV 3/10  Functional diarrhea - lost 2#/    Lomotil prescribed.    .  Acute low back pain from new L5 met status post ablation and kyphoplasty  -Your acute worsening of back pain after the L5 biopsy has improved somewhat and you are no longer taking Dilaudid 8 mg every 3 hours. - You take Dilaudid 4 mg every 6 hours but you take 2 tabs at night.  -Continue OxyContin 40 mg 2 times a day.      2. Chemo induced diarrhea  -You are recently started on Creon but this has not made a big difference and you continue to have loose watery stools about 10 times per day. You are very dehydrated because of the same. We will arrange for you to have IV fluids tomorrow. Your son who visited you yesterday had some GI symptoms and you are worried you got an infection from him. Give it a day or 2 and after that start taking Lomotil 3 times a day as needed for diarrhea, this is a medicine to help with decreasing the volume of your stool. You have also restarted Imodium and already benefiting from fewer stools today.     3. Sjogren's disease  -You have a tendency to get dehydrated very quickly. We talked about this in detail today including how to be proactive and drink more than a liter of water per day, have Gatorade and lemonade at home. Once you start chemotherapy, if you are not able to keep up with your oral fluid intake, please let us know so we can arrange for you to receive IV fluids in the infusion center or even at home if your insurance allows. We talked about the use of dispatch health which is more for urgent care needs only and not for nonurgent needs. It is better to call us so we can arrange the care that you need proactively.     4.  Multivitamin deficiency  -You were found to have severe vitamin A and vitamin D deficiency. You are on replacement therapy now. Please discuss with your primary care doctor about rechecking the levels and continuing prescription level replacement.     5. We completed an AMD naming your wife as Nikko.     6.  Disease progression  -We reviewed your scans and plan for chemotherapy in detail. You are tolerating chemotherapy fairly well so far except for the diarrhea.      7. Anorexia  -He lost 2 more pounds, you feel weak. You have desire to eat and eating small frequent meals. Please start drinking Ensure and boost at least 2 bottles per day.     8.  I am glad you are receiving your [employer] COVID-19 vaccine today.     3/12/2021 Department: Martín Esposito Released By:  (auto-released) Authorizing: Meredith Cervantes MD   Component Value Flag Ref Range Units Status   Calcium, ionized (POC) 1.22   1.12 - 1.32 mmol/L Final   Sodium (POC) 138   136 - 145 mmol/L Final   Potassium (POC) 3.5   3.5 - 5.1 mmol/L Final   Chloride (POC) 104   98 - 107 mmol/L Final   CO2 (POC) 24   21 - 32 mmol/L Final   Anion gap (POC) 15   10 - 20 mmol/L Final   Glucose (POC) 125  High   65 - 100 mg/dL Final   BUN (POC) 19   9 - 20 mg/dL Final   Creatinine (POC) 0.7   0.6 - 1.3 mg/dL Final   GFRAA, POC >60   >60 ml/min/1.73m2 Final   GFRNA, POC >60   >60 ml/min/1.73m2 Final   Comment:

## 2021-03-23 NOTE — PROGRESS NOTES
Ana Lester. is a 60 y.o. male here for follow up for met cholangiocarcinoma. Treatment today:  Cycle 4 Day 1 Nab-Paclitaxel + Gemcitabine + Cisplatin    1. Have you been to the ER, urgent care clinic since your last visit? Hospitalized since your last visit? no    2. Have you seen or consulted any other health care providers outside of the [address] [place] [address] since your last visit? Include any pap smears or colon screening. no    Pt states for one week his ankles have been swollen. He has seen some blood in his nose when he blows it but not today. He says his legs feel tired.

## 2021-03-25 ENCOUNTER — OFFICE VISIT (OUTPATIENT)
Dept: ONCOLOGY | Age: 66
End: 2021-03-25
Payer: COMMERCIAL

## 2021-03-25 ENCOUNTER — HOSPITAL ENCOUNTER (OUTPATIENT)
Dept: INFUSION THERAPY | Age: 66
Discharge: HOME | End: 2021-03-25
Payer: COMMERCIAL

## 2021-03-25 VITALS
RESPIRATION RATE: 16 BRPM | DIASTOLIC BLOOD PRESSURE: 65 MMHG | TEMPERATURE: 97.3 F | WEIGHT: 154 LBS | OXYGEN SATURATION: 94 % | HEART RATE: 89 BPM | SYSTOLIC BLOOD PRESSURE: 111 MMHG | BODY MASS INDEX: 23.34 KG/M2 | HEIGHT: 68 IN

## 2021-03-25 VITALS
HEART RATE: 67 BPM | RESPIRATION RATE: 16 BRPM | BODY MASS INDEX: 23.34 KG/M2 | OXYGEN SATURATION: 94 % | WEIGHT: 154 LBS | HEIGHT: 68 IN | SYSTOLIC BLOOD PRESSURE: 99 MMHG | DIASTOLIC BLOOD PRESSURE: 57 MMHG | TEMPERATURE: 97.3 F

## 2021-03-25 DIAGNOSIS — K90.9 DIARRHEA DUE TO MALABSORPTION: ICD-10-CM

## 2021-03-25 DIAGNOSIS — R19.7 DIARRHEA DUE TO MALABSORPTION: ICD-10-CM

## 2021-03-25 DIAGNOSIS — C79.51 METASTATIC CANCER TO BONE (HCC): Primary | ICD-10-CM

## 2021-03-25 DIAGNOSIS — C22.1 CHOLANGIOCARCINOMA OF BILIARY TRACT (HCC): ICD-10-CM

## 2021-03-25 DIAGNOSIS — C24.9 CHOLANGIOCARCINOMA DETERMINED BY BIOPSY OF BILIARY TRACT (HCC): Primary | ICD-10-CM

## 2021-03-25 LAB
ALBUMIN SERPL-MCNC: 3.1 G/DL (ref 3.5–5)
ALBUMIN/GLOB SERPL: 1.2 {RATIO} (ref 1.1–2.2)
ALP SERPL-CCNC: 183 U/L (ref 45–117)
ALT SERPL-CCNC: 26 U/L (ref 12–78)
ANION GAP SERPL CALC-SCNC: 6 MMOL/L (ref 5–15)
AST SERPL-CCNC: 10 U/L (ref 15–37)
BASOPHILS # BLD: 0.1 K/UL (ref 0–0.1)
BASOPHILS NFR BLD: 3 % (ref 0–1)
BILIRUB SERPL-MCNC: 0.5 MG/DL (ref 0.2–1)
BUN SERPL-MCNC: 14 MG/DL (ref 6–20)
BUN/CREAT SERPL: 19 (ref 12–20)
CALCIUM SERPL-MCNC: 8.4 MG/DL (ref 8.5–10.1)
CHLORIDE SERPL-SCNC: 106 MMOL/L (ref 97–108)
CO2 SERPL-SCNC: 25 MMOL/L (ref 21–32)
CREAT SERPL-MCNC: 0.74 MG/DL (ref 0.7–1.3)
DIFFERENTIAL METHOD BLD: ABNORMAL
EOSINOPHIL # BLD: 0.2 K/UL (ref 0–0.4)
EOSINOPHIL NFR BLD: 5 % (ref 0–7)
ERYTHROCYTE [DISTWIDTH] IN BLOOD BY AUTOMATED COUNT: 18.9 % (ref 11.5–14.5)
GLOBULIN SER CALC-MCNC: 2.6 G/DL (ref 2–4)
GLUCOSE SERPL-MCNC: 161 MG/DL (ref 65–100)
HCT VFR BLD AUTO: 28.9 % (ref 36.6–50.3)
HGB BLD-MCNC: 9.1 G/DL (ref 12.1–17)
IMM GRANULOCYTES # BLD AUTO: 0 K/UL (ref 0–0.04)
IMM GRANULOCYTES NFR BLD AUTO: 0 % (ref 0–0.5)
LYMPHOCYTES # BLD: 0.2 K/UL (ref 0.8–3.5)
LYMPHOCYTES NFR BLD: 8 % (ref 12–49)
MAGNESIUM SERPL-MCNC: 2.2 MG/DL (ref 1.6–2.4)
MCH RBC QN AUTO: 29.8 PG (ref 26–34)
MCHC RBC AUTO-ENTMCNC: 31.5 G/DL (ref 30–36.5)
MCV RBC AUTO: 94.8 FL (ref 80–99)
MONOCYTES # BLD: 0.2 K/UL (ref 0–1)
MONOCYTES NFR BLD: 5 % (ref 5–13)
MYELOCYTES NFR BLD MANUAL: 1 %
NEUTS BAND NFR BLD MANUAL: 1 %
NEUTS SEG # BLD: 2.4 K/UL (ref 1.8–8)
NEUTS SEG NFR BLD: 77 % (ref 32–75)
NRBC # BLD: 0 K/UL (ref 0–0.01)
NRBC BLD-RTO: 0 PER 100 WBC
PLATELET # BLD AUTO: 106 K/UL (ref 150–400)
PMV BLD AUTO: 10.7 FL (ref 8.9–12.9)
POTASSIUM SERPL-SCNC: 4 MMOL/L (ref 3.5–5.1)
PROT SERPL-MCNC: 5.7 G/DL (ref 6.4–8.2)
RBC # BLD AUTO: 3.05 M/UL (ref 4.1–5.7)
RBC MORPH BLD: ABNORMAL
SODIUM SERPL-SCNC: 137 MMOL/L (ref 136–145)
WBC # BLD AUTO: 3.1 K/UL (ref 4.1–11.1)

## 2021-03-25 PROCEDURE — 85025 COMPLETE CBC W/AUTO DIFF WBC: CPT

## 2021-03-25 PROCEDURE — 74011250636 HC RX REV CODE- 250/636: Performed by: INTERNAL MEDICINE

## 2021-03-25 PROCEDURE — 99215 OFFICE O/P EST HI 40 MIN: CPT | Performed by: INTERNAL MEDICINE

## 2021-03-25 PROCEDURE — 83735 ASSAY OF MAGNESIUM: CPT

## 2021-03-25 PROCEDURE — 96375 TX/PRO/DX INJ NEW DRUG ADDON: CPT

## 2021-03-25 PROCEDURE — 77030012965 HC NDL HUBR BBMI -A

## 2021-03-25 PROCEDURE — 96361 HYDRATE IV INFUSION ADD-ON: CPT

## 2021-03-25 PROCEDURE — 36415 COLL VENOUS BLD VENIPUNCTURE: CPT

## 2021-03-25 PROCEDURE — 74011000258 HC RX REV CODE- 258: Performed by: INTERNAL MEDICINE

## 2021-03-25 PROCEDURE — 96413 CHEMO IV INFUSION 1 HR: CPT

## 2021-03-25 PROCEDURE — 80053 COMPREHEN METABOLIC PANEL: CPT

## 2021-03-25 PROCEDURE — 74011000250 HC RX REV CODE- 250: Performed by: INTERNAL MEDICINE

## 2021-03-25 PROCEDURE — 96417 CHEMO IV INFUS EACH ADDL SEQ: CPT

## 2021-03-25 RX ORDER — SODIUM CHLORIDE 0.9 % (FLUSH) 0.9 %
10 SYRINGE (ML) INJECTION AS NEEDED
Status: DISPENSED | OUTPATIENT
Start: 2021-03-25 | End: 2021-03-25

## 2021-03-25 RX ORDER — HEPARIN 100 UNIT/ML
300-500 SYRINGE INTRAVENOUS AS NEEDED
Status: ACTIVE | OUTPATIENT
Start: 2021-03-25 | End: 2021-03-25

## 2021-03-25 RX ORDER — PALONOSETRON 0.05 MG/ML
0.25 INJECTION, SOLUTION INTRAVENOUS ONCE
Status: COMPLETED | OUTPATIENT
Start: 2021-03-25 | End: 2021-03-25

## 2021-03-25 RX ORDER — SODIUM CHLORIDE 9 MG/ML
25 INJECTION, SOLUTION INTRAVENOUS CONTINUOUS
Status: DISPENSED | OUTPATIENT
Start: 2021-03-25 | End: 2021-03-25

## 2021-03-25 RX ORDER — SODIUM CHLORIDE 9 MG/ML
10 INJECTION INTRAMUSCULAR; INTRAVENOUS; SUBCUTANEOUS AS NEEDED
Status: ACTIVE | OUTPATIENT
Start: 2021-03-25 | End: 2021-03-25

## 2021-03-25 RX ADMIN — DEXAMETHASONE SODIUM PHOSPHATE 12 MG: 4 INJECTION, SOLUTION INTRA-ARTICULAR; INTRALESIONAL; INTRAMUSCULAR; INTRAVENOUS; SOFT TISSUE at 11:52

## 2021-03-25 RX ADMIN — MAGNESIUM SULFATE HEPTAHYDRATE: 500 INJECTION, SOLUTION INTRAMUSCULAR; INTRAVENOUS at 10:45

## 2021-03-25 RX ADMIN — PACLITAXEL 181 MG: 100 INJECTION, POWDER, LYOPHILIZED, FOR SUSPENSION INTRAVENOUS at 12:20

## 2021-03-25 RX ADMIN — SODIUM CHLORIDE 10 ML: 9 INJECTION, SOLUTION INTRAMUSCULAR; INTRAVENOUS; SUBCUTANEOUS at 09:07

## 2021-03-25 RX ADMIN — SODIUM CHLORIDE 150 MG: 900 INJECTION, SOLUTION INTRAVENOUS at 11:51

## 2021-03-25 RX ADMIN — SODIUM CHLORIDE 25 ML/HR: 900 INJECTION, SOLUTION INTRAVENOUS at 11:48

## 2021-03-25 RX ADMIN — Medication 500 UNITS: at 14:57

## 2021-03-25 RX ADMIN — SODIUM CHLORIDE 10 ML: 9 INJECTION, SOLUTION INTRAMUSCULAR; INTRAVENOUS; SUBCUTANEOUS at 14:57

## 2021-03-25 RX ADMIN — CISPLATIN 45.3 MG: 1 INJECTION INTRAVENOUS at 13:38

## 2021-03-25 RX ADMIN — SODIUM CHLORIDE 1448 MG: 900 INJECTION, SOLUTION INTRAVENOUS at 13:00

## 2021-03-25 RX ADMIN — PALONOSETRON HYDROCHLORIDE 0.25 MG: 0.25 INJECTION, SOLUTION INTRAVENOUS at 11:49

## 2021-03-25 NOTE — LETTER
3/25/2021 Patient: Fran Phan. YOB: 1955 Date of Visit: 3/25/2021 Professor Carine Lester DO 
[address] [address] [address] [employer] Dear Professor Carine Lester DO, Thank you for referring Mr. Reinaldo Hardy to [address] [place] [address] for evaluation. My notes for this consultation are attached. If you have questions, please do not hesitate to call me. I look forward to following your patient along with you.  
 
 
Sincerely, 
 
Michaela Kerr NP

## 2021-03-25 NOTE — PROGRESS NOTES
[facility] Progress Note    Date: 2021    Name: Kinga Lee. MRN: [id number]         : 1955    Mr. Cristina arrived ambulatory at 0900 and in no distress for C4D1 Cisplatin/Gemzar/Abraxane. Assessment was completed, no acute issues at this time, no new complaints voiced. Covid Screening      1. Do you have any symptoms of COVID-19? SOB, coughing, fever, or generally not feeling well ? no  2. Have you been exposed to COVID-19 recently? no  3. Have you had any recent contact with family/friend that has a pending COVID test? no    Venous Access Device Port 20 (Active)   Central Line Being Utilized Yes 21   Criteria for Appropriate Use Irritant/vesicant medication 21   Site Assessment Clean, dry, & intact 21   Date of Last Dressing Change 21   Dressing Status New 21   Dressing Type Transparent 21   Action Taken Blood drawn 21   Date Accessed (Medial Site) 21   Access Time (Medial Site) 0915 21   Access Needle Size (Site #1) 20 G 21   Access Needle Length (Medial Site) 0.75 inches 21   Positive Blood Return (Medial Site) Yes 21   Action Taken (Medial Site) Blood drawn;Flushed 21   Alcohol Cap Used Yes 21      + blood return noted, labs drawn and sent. Proceeded to appt with Dr. Mr. Chadd Alvarez vitals were reviewed. Patient Vitals for the past 12 hrs:   Temp Pulse Resp BP SpO2   21 1457  67 16 (!) 99/57    21 0905 97.3 °F (36.3 °C) 89 16 111/65 94 %       Lab results were obtained and reviewed.   Recent Results (from the past 12 hour(s))   CBC WITH AUTOMATED DIFF    Collection Time: 21  9:13 AM   Result Value Ref Range    WBC 3.1 (L) 4.1 - 11.1 K/uL    RBC 3.05 (L) 4.10 - 5.70 M/uL    HGB 9.1 (L) 12.1 - 17.0 g/dL    HCT 28.9 (L) 36.6 - 50.3 %    MCV 94.8 80.0 - 99.0 FL    MCH 29.8 26.0 - 34.0 PG MCHC 31.5 30.0 - 36.5 g/dL    RDW 18.9 (H) 11.5 - 14.5 %    PLATELET 983 (L) 421 - 400 K/uL    MPV 10.7 8.9 - 12.9 FL    NRBC 0.0 0  WBC    ABSOLUTE NRBC 0.00 0.00 - 0.01 K/uL    NEUTROPHILS 77 (H) 32 - 75 %    BAND NEUTROPHILS 1 %    LYMPHOCYTES 8 (L) 12 - 49 %    MONOCYTES 5 5 - 13 %    EOSINOPHILS 5 0 - 7 %    BASOPHILS 3 (H) 0 - 1 %    MYELOCYTES 1 %    IMMATURE GRANULOCYTES 0 0.0 - 0.5 %    ABS. NEUTROPHILS 2.4 1.8 - 8.0 K/UL    ABS. LYMPHOCYTES 0.2 (L) 0.8 - 3.5 K/UL    ABS. MONOCYTES 0.2 0.0 - 1.0 K/UL    ABS. EOSINOPHILS 0.2 0.0 - 0.4 K/UL    ABS. BASOPHILS 0.1 0.0 - 0.1 K/UL    ABS. IMM. GRANS. 0.0 0.00 - 0.04 K/UL    DF MANUAL      RBC COMMENTS ANISOCYTOSIS  1+       METABOLIC PANEL, COMPREHENSIVE    Collection Time: 03/25/21  9:13 AM   Result Value Ref Range    Sodium 137 136 - 145 mmol/L    Potassium 4.0 3.5 - 5.1 mmol/L    Chloride 106 97 - 108 mmol/L    CO2 25 21 - 32 mmol/L    Anion gap 6 5 - 15 mmol/L    Glucose 161 (H) 65 - 100 mg/dL    BUN 14 6 - 20 MG/DL    Creatinine 0.74 0.70 - 1.30 MG/DL    BUN/Creatinine ratio 19 12 - 20      GFR est AA >60 >60 ml/min/1.73m2    GFR est non-AA >60 >60 ml/min/1.73m2    Calcium 8.4 (L) 8.5 - 10.1 MG/DL    Bilirubin, total 0.5 0.2 - 1.0 MG/DL    ALT (SGPT) 26 12 - 78 U/L    AST (SGOT) 10 (L) 15 - 37 U/L    Alk. phosphatase 183 (H) 45 - 117 U/L    Protein, total 5.7 (L) 6.4 - 8.2 g/dL    Albumin 3.1 (L) 3.5 - 5.0 g/dL    Globulin 2.6 2.0 - 4.0 g/dL    A-G Ratio 1.2 1.1 - 2.2     MAGNESIUM    Collection Time: 03/25/21  9:13 AM   Result Value Ref Range    Magnesium 2.2 1.6 - 2.4 mg/dL     Patient labs within parameters for treatment. Pre-medications  were administered as ordered and chemotherapy was initiated.      Medication:  Medications Administered     0.9% sodium chloride 1,000 mL with potassium chloride 10 mEq, magnesium sulfate 2 g infusion     Admin Date  03/25/2021 Action  Given Dose   Rate  1,000 mL/hr Route  IntraVENous Administered By  Radha Crow Michelle A          0.9% sodium chloride infusion     Admin Date  03/25/2021 Action  New Bag Dose  25 mL/hr Rate  25 mL/hr Route  IntraVENous Administered By  Joey Monique A          0.9% sodium chloride injection 10 mL     Admin Date  03/25/2021 Action  Given Dose  10 mL Route  IntraVENous Administered By  Joey Monique A          CISplatin (PLATINOL) 45.3 mg in 0.9% sodium chloride 250 mL, overfill volume 25 mL chemo infusion     Admin Date  03/25/2021 Action  New Bag Dose  45.3 mg Rate  320.3 mL/hr Route  IntraVENous Administered By  Joey Monique A          dexamethasone (DECADRON) 12 mg in 0.9% sodium chloride 50 mL IVPB     Admin Date  03/25/2021 Action  Given Dose  12 mg Route  IntraVENous Administered By  Joey Monique A          fosaprepitant (EMEND) 150 mg in 0.9% sodium chloride 150 mL IVPB     Admin Date  03/25/2021 Action  Given Dose  150 mg Rate  450 mL/hr Route  IntraVENous Administered By  Joey Monique A          gemcitabine (GEMZAR) 1,448 mg in 0.9% sodium chloride 250 mL, overfill volume 25 mL chemo infusion     Admin Date  03/25/2021 Action  New Bag Dose  1,448 mg Rate  626.2 mL/hr Route  IntraVENous Administered By  Joey Monique A          heparin (porcine) pf 300-500 Units     Admin Date  03/25/2021 Action  Given Dose  500 Units Route  InterCATHeter Administered By  Joey FALL          PACLitaxel-protein bound (ABRAXANE) 181 mg in 0.9% sodium chloride 36.2 mL chemo infusion     Admin Date  03/25/2021 Action  New Bag Dose  181 mg Rate  72.4 mL/hr Route  IntraVENous Administered By  Joey FALL          palonosetron HCl (ALOXI) injection 0.25 mg     Admin Date  03/25/2021 Action  Given Dose  0.25 mg Route  IntraVENous Administered By  Joey Monique A          sodium chloride (NS) flush 10 mL     Admin Date  03/25/2021 Action  Given Dose  10 mL Route  IntraVENous Administered By  Stella Lieberman           Admin Date  03/25/2021 Action  Given Dose  10 mL Route  IntraVENous Administered By  Sunita Smith              Patient port flushed; heparinized; and de-accessed per protocol. Mr. Tanisha Taylor tolerated treatment well and was discharged from [place] [address] in stable condition at 1500. He is aware of when to return for his next appointment.     Future Appointments   Date Time Provider Sloane Roach   3/25/2021  9:00 AM CHAIR 2 [address] REG   3/25/2021  9:30 AM NIKI Swanson BS AMB   4/1/2021  9:00 AM CHAIR 2 [facility] REG   4/8/2021 12:30 PM Daniela Cloud MD [address] [facility] BS AMB       Isabela Jones  March 25, 2021

## 2021-03-25 NOTE — PROGRESS NOTES
[address] [facility] [address], [address] [facility], [address] [place] [address], 200 [facility]  [phone]    Follow-up Note      Patient: Cassidy Danielle MRN: [id number]  SSN: [id number]    YOB: 1955  Age: 60 y.o. Sex: male        Diagnosis:     1. 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 S/P Pancreatoduodenectomy on  10/06/2017  2. Adjuvant monotherapy with Capecitabine - s/p 6 cycles   (12/4/2017 - 05/2018)  3. SBRT RP node/soft tissue 04/2020  4. Palliative chemotherapy   Cis/[place]/Abraxane - Cycle 4 Day 1     Subjective:      Cassidy Danielle is a 60 y.o. male with a diagnosis of extrahepatic cholangiocarcinoma with metastatic to the lungs, bones and retroperitoneal node/soft tissue. He presented to the ED in August 2017 with obstructive jaundice. He was found to have an obstructive lesion in the dital bile duct. The CT showed marked intrahepatic biliary dilation and common bile duct dilation to the level of the mid common bile duct, which ws markedly narrowed. He underwent a stenting procedure followed by spyglass cholangiogram. The brushings revealed a diagnosis of cholangiocarcinoma. He underwent a Whipple procedure on 10/06/2017. He completed 6 cycles of adjuvant Xeloda. PET scan showed increased activity in the soft tissue along the SMA. CT guided biopsy showed local recurrence. He underwent SBRT by Dr. Mainor Garcia in April 2020. He was admitted with severe back pain. Repeat CT shows growth of tumor in the L4/L5, lung and RP node/soft tissue. He underwent a CT guided celiac plexus block which has helped the back pain immensely. A biopsy of the L4 vertebrae confirms a diagnosis of metastatic cholangiocarcinoma.      Mr. Kailyn Smith is receiving palliative chemotherapy. He says he is doing well other than feeling tired and having some lower extremity edema. He says he has a good appetite. He continues to have diarrhea and is taking imodium and lomotil to control this. Review of Systems:    Constitutional: fatigue  Eyes: negative  Ears, Nose, Mouth, Throat, and Face: minimal blood when he blows his nose  Respiratory: negative  Cardiovascular: negative  Gastrointestinal: diarrhea denies nausea  Genitourinary:negative  Integument/Breast: negative  Hematologic/Lymphatic: negative  Musculoskeletal: Lower extremity swelling   Neurological: negative      Past Medical History:   Diagnosis Date    Aneurysm ([facility] 2008    CEREBRAL    Arrhythmia     Previous a.fib, ablation, NSR now; NO LONGER FOLLOWED BY CARDIOLOGIST.     Autoimmune disease ([facility]     SJOGREN'S    Cancer ([facility] 2020    COMMON BILE DUCT, ADENOCARCINOMA, SPINE    Diabetes ([facility] [address]     NIDDM    Family history of skin cancer     Hypertension     Sepsis ([facility] [address] [facility] 2017    STENTING TO BILE DUCT    Status post chemotherapy     Stroke [facility] 2008    brain aneurysm - no deficits    Sun-damaged skin     Sunburn, blistering      Past Surgical History:   Procedure Laterality Date    HX CHOLECYSTECTOMY      HX GI      COLONOSCOPY, POLYPS (BENIGN)    HX GI  10/06/2017    [facility]     HX GI  2020    WHIPPLE REVISION    HX HEART CATHETERIZATION  2005    Ablation     HX HERNIA REPAIR  12/2020    HERNIA REPAIR    HX ORTHOPAEDIC  2000    Spinal fusion W/ HARDWARE    HX OTHER SURGICAL  11/07/2017    [place] Cath, Dr. Meet Mustafa HX OTHER SURGICAL  01/11/2021    RIGHT IJ PORT-A-CATH PLACEMENT     HX VASCULAR ACCESS  2017    PORTACATH - then removed    IR KYPHOPLASTY LUMBAR  1/12/2021    NEUROLOGICAL PROCEDURE UNLISTED  2005    Bath hole washout from cerebral hemorrhage    [place] ABDOMEN SURGERY PROC UNLISTED  10/2017    APRIL      Family History   Problem Relation Age of Onset    Cancer Father Breast and Colon, MELANOMA    Hypertension Father     Cancer Mother         LEUKEMIA    No Known Problems Sister     Atrial Fibrillation Brother     No Known Problems Sister     No Known Problems Son     No Known Problems Son     Anesth Problems Neg Hx      Social History     Tobacco Use    Smoking status: Never Smoker    Smokeless tobacco: Never Used   Substance Use Topics    Alcohol use: Never     Frequency: Never     Comment: very rare      Prior to Admission medications    Medication Sig Start Date End Date Taking? Authorizing Provider   diphenoxylate-atropine (LomotiL) 2.5-0.025 mg per tablet Take 1 Tab by mouth three (3) times daily as needed for Diarrhea. Max Daily Amount: 3 Tabs. 3/10/21  Yes Kathy Garcia MD   OLANZapine (ZyPREXA) 10 mg tablet Take 1 Tab by mouth See Admin Instructions. Take nightly for 4 days starting the evening of chemotherapy. 1/29/21  Yes Ally Gray MD   pantoprazole (PROTONIX) 40 mg tablet TAKE 1 TABLET BY MOUTH EVERY DAY 1/11/21  Yes Kathy Garcia MD   lidocaine-prilocaine (EMLA) topical cream Apply  to affected area as needed for Pain. 30-45 min prior to treatments 1/7/21  Yes Ally Gray MD   ondansetron (ZOFRAN ODT) 4 mg disintegrating tablet Take 1 Tab by mouth every eight (8) hours as needed for Nausea or Vomiting. 1/7/21  Yes Ally Gray MD   dexAMETHasone (DECADRON) 1 mg tablet Take 2 tablets by mouth twice daily for 14 days 12/31/20  Yes Estefani Covington MD   dutasteride (AVODART) 0.5 mg capsule Take 1 Cap by mouth daily. 12/21/20  Yes Daily Francois DO   Creon 36,000-114,000- 180,000 unit cpDR capsule Take 4 Caps by mouth three (3) times daily (with meals). 2-3 caps each meal and 1 cap for snacks (see additional order) 12/21/20  Yes Silas Raya III, DO   naloxone (NARCAN) 4 mg/actuation nasal spray Use 1 spray intranasally, then discard.  Repeat with new spray every 2 min as needed for opioid overdose symptoms, alternating nostrils. 12/11/20  Yes Lex Russo MD   aluminum & magnesium hydroxide-simethicone (Maalox Maximum Strength) 400-400-40 mg/5 mL suspension Take 10 mL by mouth every six (6) hours as needed for Indigestion. Yes Provider, Historical   vitamin A (AQUASOL A) 10,000 unit capsule Take 1 Cap by mouth daily. 10/26/20  Yes Silas Raya III, DO   vitamin E (AQUA GEMS) 400 unit capsule Take 1 Cap by mouth daily. 10/26/20  Yes Silas Raya III, DO   empagliflozin (Jardiance) 25 mg tablet Take 1 Tab by mouth nightly. 10/20/20  Yes Silas Raya III, DO   gabapentin (NEURONTIN) 100 mg capsule Take 3 capsules by mouth twice a day. 10/20/20  Yes Silas Raya III, DO   cyanocobalamin (VITAMIN B12) 1,000 mcg/mL injection INJECT CONTENTS OF ONE VIAL INTRAMUSCULARLY EVERY OTHER WEEK  Patient taking differently: INJECT CONTENTS OF ONE VIAL INTRAMUSCULARLY EVERY OTHER WEEK (FIRST AND 15TH OF EACH MONTH) 10/20/20  Yes Silas Raya III,    tamsulosin (FLOMAX) 0.4 mg capsule TAKE ONE CAPSULE BY MOUTH EVERY EVENING 10/20/20  Yes Silas Raya III, DO   ramipriL (ALTACE) 5 mg capsule Take 1 Cap by mouth daily. Patient taking differently: Take 5 mg by mouth nightly. 10/20/20  Yes Silas Raya III, DO   lidocaine (LIDODERM) 5 % 1 Patch by TransDERmal route every twenty-four (24) hours. Apply patch to the affected area for 12 hours a day and remove for 12 hours a day. Patient taking differently: 1 Patch by TransDERmal route daily as needed. Apply patch to the affected area for 12 hours a day and remove for 12 hours a day. 10/15/20  Yes Silas Raya III, DO   loperamide (IMODIUM) 2 mg capsule Take 2-4 mg by mouth as needed for Diarrhea. Give 4 mg after first loose stool. Give an additional 2 mg after each loose stool as needed up to a maximum of 8 doses (16 mg/day).    Yes Provider, Historical   lipase-protease-amylase (Creon) 36,000-114,000- 180,000 unit cpDR capsule Take 1 Cap by mouth as needed (for snacks). 2-3 caps each meal and 1 cap for snacks (see additional order)   Yes Provider, Historical   finasteride (PROSCAR) 5 mg tablet TAKE 1 TABLET BY MOUTH EVERY DAY 4/13/20  Yes Provider, Historical   acetaminophen (TYLENOL) 325 mg tablet Take 2 Tabs by mouth every four (4) hours as needed for Pain or Fever (Over the counter medication). 1/2/20  Yes Liza Contreras, NIKI   sildenafil citrate (VIAGRA) 50 mg tablet Take 1 Tab by mouth as needed (ED). 5/6/19  Yes Silas Raya III, DO   alpha-D-galactosidase (BEANO PO) Take 1 Tab by mouth two (2) times a day. Yes Other, MD Flynn   cetirizine (ZYRTEC) 10 mg tablet Take 10 mg by mouth nightly. Yes Provider, Historical          Allergies   Allergen Reactions    Shellfish Derived Anaphylaxis     Soft shell crabs    Morphine Nausea and Vomiting    Pcn [Penicillins] Other (comments)     Pt stated \"always been told PCN\"  Pt tolerated other cephalosporins in the past           Objective:     Visit Vitals  /65   Pulse 89   Temp 97.3 °F (36.3 °C)   Resp 16   Ht 5' 8\" (1.727 m)   Wt 154 lb (69.9 kg)   SpO2 94%   BMI 23.42 kg/m²     Pain Scale: 0 - No pain/10       Physical Exam:     GENERAL: alert, cooperative, no distress, appears stated age  EYE: negative  LYMPHATIC: Cervical, supraclavicular, and axillary nodes normal.   THROAT & NECK: normal and no erythema or exudates noted.    LUNG: clear to auscultation bilaterally  HEART: regular rate and rhythm  ABDOMEN: soft, non-tender  EXTREMITIES:  no edema  SKIN: Normal.  NEUROLOGIC: negative       Physical exam and ROS has been modified from a prior visit to make it relevant and current      Lab Results   Component Value Date/Time    WBC 3.1 (L) 03/25/2021 09:13 AM    Hemoglobin (POC) 10.3 (L) 10/06/2017 01:14 PM    HGB 9.1 (L) 03/25/2021 09:13 AM    Hematocrit (POC) 27 (L) 03/12/2021 09:27 AM    HCT 28.9 (L) 03/25/2021 09:13 AM    PLATELET 483 (L) 16/18/3830 09:13 AM    MCV 94.8 03/25/2021 09:13 AM       Lab Results   Component Value Date/Time    Sodium 137 03/25/2021 09:13 AM    Potassium 4.0 03/25/2021 09:13 AM    Chloride 106 03/25/2021 09:13 AM    CO2 25 03/25/2021 09:13 AM    Anion gap 6 03/25/2021 09:13 AM    Glucose 161 (H) 03/25/2021 09:13 AM    BUN 14 03/25/2021 09:13 AM    Creatinine 0.74 03/25/2021 09:13 AM    BUN/Creatinine ratio 19 03/25/2021 09:13 AM    GFR est AA >60 03/25/2021 09:13 AM    GFR est non-AA >60 03/25/2021 09:13 AM    Calcium 8.4 (L) 03/25/2021 09:13 AM    Bilirubin, total 0.5 03/25/2021 09:13 AM    Alk. phosphatase 183 (H) 03/25/2021 09:13 AM    Protein, total 5.7 (L) 03/25/2021 09:13 AM    Albumin 3.1 (L) 03/25/2021 09:13 AM    Globulin 2.6 03/25/2021 09:13 AM    A-G Ratio 1.2 03/25/2021 09:13 AM    ALT (SGPT) 26 03/25/2021 09:13 AM    AST (SGOT) 10 (L) 03/25/2021 09:13 AM       CT Results (most recent):  Results from Hospital Encounter encounter on 03/01/21   CT ABD PELV W CONT    Narrative EXAM:  CT CHEST W CONT, CT ABD PELV W CONT  INDICATION:  Restaging disease. Intrahepatic bile duct carcinoma  Additional history:  COMPARISON: CT of the chest, abdomen and pelvis, 12/29/2020. CT of the abdomen  and pelvis, 9/20/2020  . TECHNIQUE:   Multislice helical CT was performed from the thoracic inlet to the pubic  symphysis with intravenous contrast administration. Contiguous 5 mm axial images  were reconstructed and lung and soft tissue windows were generated. Coronal and  sagittal reformations were generated. CT dose reduction was achieved through use of a standardized protocol tailored  for this examination and automatic exposure control for dose modulation. Alan Javier FINDINGS:  CHEST:  Chest wall/thoracic inlet: Within normal limits. Thyroid: Within normal limits. Mediastinum/elli: Within normal limits. Heart/vessels: There is a port in the right chest with a right IJ approach  catheter that terminates in the distal SVC.  Calcifications in the coronary  arteries. Lungs/Pleura: There are numerous pulmonary nodules, most pronounced in the right  upper lobe which are not significantly changed. A representative nodule in the  posterior, left lower lobe is unchanged (#66, series 4). No significant change  in the appearance of a nodule in the medial most right lower lobe, posterior to  the right atrium. An area of spiculation/scarring in the medial aspect of the  minor fissure is not significant changed. .  ABDOMEN:  Liver: Pneumobilia. Gallbladder/Biliary: Within normal limits. Spleen: Within normal limits. Pancreas: Postsurgical changes of Whipple. Adrenals: Within normal limits. Kidneys: Left perinephric stranding. There is an exophytic, low-attenuation  lesion projecting off the left kidney of likely no clinical significance,  incompletely characterized. Tiny, low-attenuation lesion in the upper pole the  left kidney of likely no clinical significance, too small accurately  characterize. Peritoneum/Mesenteries: Within normal limits. Extraperitoneum: Soft tissue attenuation surrounds the proximal abdominal aorta  at the level of the renal arteries which is similar to comparison, measuring  approximately 5.3 x 4 cm (#70, series 2). Gastrointestinal tract: Tiny amount of oral contrast material in the distal  esophagus suggesting either dysmotility or reflux Postsurgical changes of  Whipple. Diverticulosis in the descending and sigmoid colon. Vascular: Calcifications in the abdominal aortic. There is mass effect upon the  aorta from the soft tissue. The left renal vein appears to be completely  obstructed by the retroperitoneal mass. There is collateral venous drainage in  the retroperitoneum  . PELVIS:  Extraperitoneum: Within normal limits. Ureters: Within normal limits. Bladder: Within normal limits. Reproductive System: Calcifications in the prostate. .  MSK:   Slight dextroscoliosis of the thoracic spine.  Dextroscoliosis of the lumbar  spine. Posterior pedicle screw and sebastian fixation of L5/S1 with slight anterior  listhesis and vertebroplasty and L5. .    Impression 1. No significant change in the appearance of pulmonary nodules. 2. No significant change in the appearance of retroperitoneal adenopathy/mass. There is mass effect upon the vasculature, including occlusion of the left renal  vein with collateral vessels in the retroperitoneum, likely unchanged. 3. Incidental findings as above. I personally reviewed the images. Stable disease. Assessment:     1. 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 Local recurrence in the para-aortic soft tissue - S/P SBRT     Recent CT shows progression of disease in the retroperitoneum, L4, lungs  The disease is unresectable. Treatment will in a palliative intent with a goal to extend life. Receiving palliative chemotherapy   Cis/[place]/Abraxane - Cycle 4 Day 1    Tolerating treatment   + nausea, fatigue  A detailed system by system evaluation of side effect was performed to assess chemotherapy related toxicity. Blood counts are acceptable. Results reviewed with the patient. Repeat CT chest/Abd/pelvis - 3/1/2021 - stable disease  Repeat NM bone scan 3/1/2021 - new are in the pelvis. Although in absence of symptoms I am not certain it is truly progression of disease. 2. Cancer related pain    S/P celiac plexus block - done by Dr. Tristan Schwab with significant improvement in the pain  Following with Palliative medicine. Taking Oxycodone twice a day and dilaudid 1 every 6 hours      3.  Bone metastasis, L4    Nuclear medicine bone scan  Ablation/Kyphoplasty - Dr. Jeremiah La  Start Denosumab      4. Nausea, CINV    Aloxi, emend, dexamethasone in OPIC  Olanzapine 10 mg po nightly x 4 starting day of chemotherapy      5. 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 Chemotherapy related neuropathy    Stable  Grade I     Plan:     > Continue chemotherapy with Cis/[place]/Abraxane  > Continue to follow with palliative medicine  > Continue Olanzapine  > Continue Lomotil and imodium  > start xgeva  > Follow-up in 3 weeks        I performed a history and physical examination of the patient and discussed his management with the NPP. I reviewed the NPP note and agree with the documented findings and plan of care. The patient was seen in conjunction with Ms. Camacho. Mr. Jorge Schilder is a gentleman with metastatic cholangio carcinoma. He is receiving systemic therapy and has stable disease. Exam is normal.      Signed by: Amirah Dukes MD                     March 25, 2021      CC. Tanesha Rocha MD  CC. Wan Alfred MD  CC. Marielle Kevin MD  CC. Michel Reis MD  CC.  Blair Kevin MD

## 2021-03-27 RX ORDER — ALBUTEROL SULFATE 0.83 MG/ML
2.5 SOLUTION RESPIRATORY (INHALATION) AS NEEDED
Status: CANCELLED
Start: 2021-04-01

## 2021-03-27 RX ORDER — HYDROCORTISONE SODIUM SUCCINATE 100 MG/2ML
100 INJECTION, POWDER, FOR SOLUTION INTRAMUSCULAR; INTRAVENOUS AS NEEDED
Status: CANCELLED | OUTPATIENT
Start: 2021-04-01

## 2021-03-27 RX ORDER — DIPHENHYDRAMINE HYDROCHLORIDE 50 MG/ML
50 INJECTION, SOLUTION INTRAMUSCULAR; INTRAVENOUS AS NEEDED
Status: CANCELLED
Start: 2021-04-01

## 2021-03-27 RX ORDER — ONDANSETRON 2 MG/ML
8 INJECTION INTRAMUSCULAR; INTRAVENOUS AS NEEDED
Status: CANCELLED | OUTPATIENT
Start: 2021-04-01

## 2021-03-27 RX ORDER — ACETAMINOPHEN 325 MG/1
650 TABLET ORAL AS NEEDED
Status: CANCELLED
Start: 2021-04-01

## 2021-03-27 RX ORDER — EPINEPHRINE 1 MG/ML
0.3 INJECTION, SOLUTION, CONCENTRATE INTRAVENOUS AS NEEDED
Status: CANCELLED | OUTPATIENT
Start: 2021-04-01

## 2021-03-27 RX ORDER — DIPHENHYDRAMINE HYDROCHLORIDE 50 MG/ML
25 INJECTION, SOLUTION INTRAMUSCULAR; INTRAVENOUS AS NEEDED
Status: CANCELLED
Start: 2021-04-01

## 2021-04-01 ENCOUNTER — HOSPITAL ENCOUNTER (OUTPATIENT)
Dept: INFUSION THERAPY | Age: 66
Discharge: HOME | End: 2021-04-01
Payer: COMMERCIAL

## 2021-04-01 VITALS
DIASTOLIC BLOOD PRESSURE: 64 MMHG | OXYGEN SATURATION: 97 % | RESPIRATION RATE: 16 BRPM | BODY MASS INDEX: 23.26 KG/M2 | WEIGHT: 153 LBS | SYSTOLIC BLOOD PRESSURE: 108 MMHG | HEART RATE: 63 BPM | TEMPERATURE: 97 F

## 2021-04-01 DIAGNOSIS — C22.1 CHOLANGIOCARCINOMA OF BILIARY TRACT (HCC): ICD-10-CM

## 2021-04-01 DIAGNOSIS — C24.9 CHOLANGIOCARCINOMA DETERMINED BY BIOPSY OF BILIARY TRACT (HCC): Primary | ICD-10-CM

## 2021-04-01 LAB
ANION GAP BLD CALC-SCNC: 15 MMOL/L (ref 10–20)
BASO+EOS+MONOS # BLD AUTO: 0.3 K/UL (ref 0.2–1.2)
BASO+EOS+MONOS NFR BLD AUTO: 16 % (ref 3.2–16.9)
BUN BLD-MCNC: 15 MG/DL (ref 9–20)
CA-I BLD-MCNC: 1.23 MMOL/L (ref 1.12–1.32)
CHLORIDE BLD-SCNC: 105 MMOL/L (ref 98–107)
CO2 BLD-SCNC: 23 MMOL/L (ref 21–32)
CREAT BLD-MCNC: 0.7 MG/DL (ref 0.6–1.3)
DIFFERENTIAL METHOD BLD: ABNORMAL
ERYTHROCYTE [DISTWIDTH] IN BLOOD BY AUTOMATED COUNT: 18.7 % (ref 11.8–15.8)
GLUCOSE BLD-MCNC: 94 MG/DL (ref 65–100)
HCT VFR BLD AUTO: 26.1 % (ref 36.6–50.3)
HCT VFR BLD CALC: 26 % (ref 36.6–50.3)
HGB BLD-MCNC: 8.8 G/DL (ref 12.1–17)
LYMPHOCYTES # BLD: 0.3 K/UL (ref 0.8–3.5)
LYMPHOCYTES NFR BLD: 18 % (ref 12–49)
MAGNESIUM SERPL-MCNC: 2.3 MG/DL (ref 1.6–2.4)
MCH RBC QN AUTO: 31.8 PG (ref 26–34)
MCHC RBC AUTO-ENTMCNC: 33.7 G/DL (ref 30–36.5)
MCV RBC AUTO: 94.2 FL (ref 80–99)
NEUTS SEG # BLD: 1.3 K/UL (ref 1.8–8)
NEUTS SEG NFR BLD: 66 % (ref 32–75)
PLATELET # BLD AUTO: 201 K/UL (ref 150–400)
POTASSIUM BLD-SCNC: 3.8 MMOL/L (ref 3.5–5.1)
RBC # BLD AUTO: 2.77 M/UL (ref 4.1–5.7)
SERVICE CMNT-IMP: ABNORMAL
SODIUM BLD-SCNC: 138 MMOL/L (ref 136–145)
WBC # BLD AUTO: 1.9 K/UL (ref 4.1–11.1)

## 2021-04-01 PROCEDURE — 96375 TX/PRO/DX INJ NEW DRUG ADDON: CPT

## 2021-04-01 PROCEDURE — 80047 BASIC METABLC PNL IONIZED CA: CPT

## 2021-04-01 PROCEDURE — 36415 COLL VENOUS BLD VENIPUNCTURE: CPT

## 2021-04-01 PROCEDURE — 96417 CHEMO IV INFUS EACH ADDL SEQ: CPT

## 2021-04-01 PROCEDURE — 96372 THER/PROPH/DIAG INJ SC/IM: CPT

## 2021-04-01 PROCEDURE — 74011250636 HC RX REV CODE- 250/636: Performed by: INTERNAL MEDICINE

## 2021-04-01 PROCEDURE — 74011000258 HC RX REV CODE- 258: Performed by: INTERNAL MEDICINE

## 2021-04-01 PROCEDURE — 83735 ASSAY OF MAGNESIUM: CPT

## 2021-04-01 PROCEDURE — 96367 TX/PROPH/DG ADDL SEQ IV INF: CPT

## 2021-04-01 PROCEDURE — 96413 CHEMO IV INFUSION 1 HR: CPT

## 2021-04-01 PROCEDURE — 77030012965 HC NDL HUBR BBMI -A

## 2021-04-01 PROCEDURE — 85025 COMPLETE CBC W/AUTO DIFF WBC: CPT

## 2021-04-01 RX ORDER — ALBUTEROL SULFATE 0.83 MG/ML
2.5 SOLUTION RESPIRATORY (INHALATION) AS NEEDED
Status: CANCELLED
Start: 2021-01-01

## 2021-04-01 RX ORDER — ONDANSETRON 2 MG/ML
8 INJECTION INTRAMUSCULAR; INTRAVENOUS AS NEEDED
Status: CANCELLED | OUTPATIENT
Start: 2021-01-01

## 2021-04-01 RX ORDER — HYDROCORTISONE SODIUM SUCCINATE 100 MG/2ML
100 INJECTION, POWDER, FOR SOLUTION INTRAMUSCULAR; INTRAVENOUS AS NEEDED
Status: CANCELLED | OUTPATIENT
Start: 2021-01-01

## 2021-04-01 RX ORDER — DIPHENHYDRAMINE HYDROCHLORIDE 50 MG/ML
50 INJECTION, SOLUTION INTRAMUSCULAR; INTRAVENOUS AS NEEDED
Status: CANCELLED
Start: 2021-01-01

## 2021-04-01 RX ORDER — DIPHENHYDRAMINE HYDROCHLORIDE 50 MG/ML
25 INJECTION, SOLUTION INTRAMUSCULAR; INTRAVENOUS AS NEEDED
Status: CANCELLED
Start: 2021-01-01

## 2021-04-01 RX ORDER — PALONOSETRON 0.05 MG/ML
0.25 INJECTION, SOLUTION INTRAVENOUS ONCE
Status: COMPLETED | OUTPATIENT
Start: 2021-04-01 | End: 2021-04-01

## 2021-04-01 RX ORDER — SODIUM CHLORIDE 0.9 % (FLUSH) 0.9 %
10 SYRINGE (ML) INJECTION AS NEEDED
Status: DISPENSED | OUTPATIENT
Start: 2021-04-01 | End: 2021-04-01

## 2021-04-01 RX ORDER — SODIUM CHLORIDE 9 MG/ML
10 INJECTION INTRAMUSCULAR; INTRAVENOUS; SUBCUTANEOUS AS NEEDED
Status: ACTIVE | OUTPATIENT
Start: 2021-04-01 | End: 2021-04-01

## 2021-04-01 RX ORDER — EPINEPHRINE 1 MG/ML
0.3 INJECTION, SOLUTION, CONCENTRATE INTRAVENOUS AS NEEDED
Status: CANCELLED | OUTPATIENT
Start: 2021-01-01

## 2021-04-01 RX ORDER — ACETAMINOPHEN 325 MG/1
650 TABLET ORAL AS NEEDED
Status: CANCELLED
Start: 2021-01-01

## 2021-04-01 RX ORDER — SODIUM CHLORIDE 9 MG/ML
25 INJECTION, SOLUTION INTRAVENOUS CONTINUOUS
Status: DISPENSED | OUTPATIENT
Start: 2021-04-01 | End: 2021-04-01

## 2021-04-01 RX ORDER — HEPARIN 100 UNIT/ML
300-500 SYRINGE INTRAVENOUS AS NEEDED
Status: ACTIVE | OUTPATIENT
Start: 2021-04-01 | End: 2021-04-01

## 2021-04-01 RX ADMIN — GEMCITABINE 1448 MG: 38 INJECTION, SOLUTION INTRAVENOUS at 12:05

## 2021-04-01 RX ADMIN — SODIUM CHLORIDE 150 MG: 900 INJECTION, SOLUTION INTRAVENOUS at 10:50

## 2021-04-01 RX ADMIN — Medication 500 UNITS: at 13:57

## 2021-04-01 RX ADMIN — PACLITAXEL 181 MG: 100 INJECTION, POWDER, LYOPHILIZED, FOR SUSPENSION INTRAVENOUS at 11:20

## 2021-04-01 RX ADMIN — CISPLATIN 45.3 MG: 1 INJECTION INTRAVENOUS at 12:46

## 2021-04-01 RX ADMIN — DENOSUMAB 120 MG: 120 INJECTION SUBCUTANEOUS at 12:51

## 2021-04-01 RX ADMIN — PALONOSETRON 0.25 MG: 0.05 INJECTION, SOLUTION INTRAVENOUS at 10:48

## 2021-04-01 RX ADMIN — SODIUM CHLORIDE 25 ML/HR: 900 INJECTION, SOLUTION INTRAVENOUS at 09:42

## 2021-04-01 RX ADMIN — Medication 10 ML: at 13:57

## 2021-04-01 RX ADMIN — DEXAMETHASONE SODIUM PHOSPHATE 12 MG: 4 INJECTION, SOLUTION INTRA-ARTICULAR; INTRALESIONAL; INTRAMUSCULAR; INTRAVENOUS; SOFT TISSUE at 10:50

## 2021-04-01 RX ADMIN — MAGNESIUM SULFATE HEPTAHYDRATE: 500 INJECTION, SOLUTION INTRAMUSCULAR; INTRAVENOUS at 09:44

## 2021-04-01 NOTE — PROGRESS NOTES
[facility] Progress Note    Date: 2021    Name: Cori Hernandez. MRN: [id number]         : 1955    Mr. Cristina arrived (ambulatory) at 9181 and in no distress for cycle 4 day 8 of abraxane/gemzar/cisplatin regimen. Assessment was completed, no acute issues at this time, no new complaints voiced. Covid Screening      1. Do you have any symptoms of COVID-19? SOB, coughing, fever, or generally not feeling well ? No  2. Have you been exposed to COVID-19 recently? No  3. Have you had any recent contact with family/friend that has a pending COVID test? No      R chest port accessed without difficulty with 0.75 cagle needle; + blood return noted, labs drawn and sent. Mr. Jono Alejandra vitals were reviewed. Patient Vitals for the past 12 hrs:   Temp Pulse Resp BP SpO2   21 1401  63 16 108/64 97 %   21 0906 97 °F (36.1 °C) 77 16 119/60 96 %       Lab results were obtained and reviewed. Recent Results (from the past 12 hour(s))   MAGNESIUM    Collection Time: 21  9:19 AM   Result Value Ref Range    Magnesium 2.3 1.6 - 2.4 mg/dL   CBC WITH 3 PART DIFF    Collection Time: 21  9:19 AM   Result Value Ref Range    WBC 1.9 (L) 4.1 - 11.1 K/uL    RBC 2.77 (L) 4.10 - 5.70 M/uL    HGB 8.8 (L) 12.1 - 17.0 g/dL    HCT 26.1 (L) 36.6 - 50.3 %    MCV 94.2 80.0 - 99.0 FL    MCH 31.8 26.0 - 34.0 PG    MCHC 33.7 30.0 - 36.5 g/dL    RDW 18.7 (H) 11.8 - 15.8 %    PLATELET 565 334 - 958 K/uL    NEUTROPHILS 66 32 - 75 %    MIXED CELLS 16 3.2 - 16.9 %    LYMPHOCYTES 18 12 - 49 %    ABS. NEUTROPHILS 1.3 (L) 1.8 - 8.0 K/UL    ABS. MIXED CELLS 0.3 0.2 - 1.2 K/uL    ABS.  LYMPHOCYTES 0.3 (L) 0.8 - 3.5 K/UL    DF AUTOMATED     POC CHEM8    Collection Time: 21  9:24 AM   Result Value Ref Range    Calcium, ionized (POC) 1.23 1.12 - 1.32 mmol/L    Sodium (POC) 138 136 - 145 mmol/L    Potassium (POC) 3.8 3.5 - 5.1 mmol/L    Chloride (POC) 105 98 - 107 mmol/L    CO2 (POC) 23 21 - 32 mmol/L    Anion gap (POC) 15 10 - 20 mmol/L    Glucose (POC) 94 65 - 100 mg/dL    BUN (POC) 15 9 - 20 mg/dL    Creatinine (POC) 0.7 0.6 - 1.3 mg/dL    GFRAA, POC >60 >60 ml/min/1.73m2    GFRNA, POC >60 >60 ml/min/1.73m2    Hematocrit (POC) 26 (L) 36.6 - 50.3 %    Comment Comment Not Indicated. Creatinine Clearance: 104    Pre-medications  were administered as ordered and chemotherapy was initiated.      Medication:  Medications Administered     0.9% sodium chloride 1,000 mL with potassium chloride 10 mEq, magnesium sulfate 2 g infusion     Admin Date  04/01/2021 Action  Given Dose   Rate  1,000 mL/hr Route  IntraVENous Administered By  Elsy SIMON          0.9% sodium chloride infusion     Admin Date  04/01/2021 Action  New Bag Dose  25 mL/hr Rate  25 mL/hr Route  IntraVENous Administered By  Harrison Crabtree          CISplatin (PLATINOL) 45.3 mg in 0.9% sodium chloride 250 mL, overfill volume 25 mL chemo infusion     Admin Date  04/01/2021 Action  New Bag Dose  45.3 mg Rate  320.3 mL/hr Route  IntraVENous Administered By  Clemencia Ivey, ESVIN          denosumab (XGEVA) injection 120 mg     Admin Date  04/01/2021 Action  Given Dose  120 mg Route  SubCUTAneous Administered By  Elsy SIMON          dexamethasone (DECADRON) 12 mg in 0.9% sodium chloride 50 mL IVPB     Admin Date  04/01/2021 Action  Given Dose  12 mg Route  IntraVENous Administered By  Harrison Crabtree          fosaprepitant (EMEND) 150 mg in 0.9% sodium chloride 150 mL IVPB     Admin Date  04/01/2021 Action  Given Dose  150 mg Rate  450 mL/hr Route  IntraVENous Administered By  Harrison Crabtree          gemcitabine (GEMZAR) 1,448 mg in 0.9% sodium chloride 250 mL, overfill volume 25 mL chemo infusion     Admin Date  04/01/2021 Action  New Bag Dose  1,448 mg Rate  626.2 mL/hr Route  IntraVENous Administered By  Elsy SIMON          heparin (porcine) pf 300-500 Units     Admin Date  04/01/2021 Action  Given Dose  500 Units Route  InterCATHeter Administered By  Greyson Mcgarry          PACLitaxel-protein bound (ABRAXANE) 181 mg in 0.9% sodium chloride 36.2 mL chemo infusion     Admin Date  04/01/2021 Action  New Bag Dose  181 mg Rate  72.4 mL/hr Route  IntraVENous Administered By  Flakita Mata RN          palonosetron HCl (ALOXI) injection 0.25 mg     Admin Date  04/01/2021 Action  Given Dose  0.25 mg Route  IntraVENous Administered By  Linda SIMON          sodium chloride (NS) flush 10 mL     Admin Date  04/01/2021 Action  Given Dose  10 mL Route  IntraVENous Administered By  Linda SIMON                Patient port flushed; heparinized; and de-accessed per protocol. Mr. Jone Melendez tolerated treatment well and was discharged from [place] [address] in stable condition at 1400. He is aware of when to return for his next appointment.     Future Appointments   Date Time Provider Sloane Roach   4/8/2021 12:30 PM Ginger Bettencorut MD [facility] BS AMB   4/29/2021  1:00 PM CHAIR 2 [address] REG   5/27/2021  1:00 PM CHAIR 2 [facility] Trevor Alvarado  April 1, 2021

## 2021-04-02 DIAGNOSIS — C22.1 CHOLANGIOCARCINOMA OF BILIARY TRACT (HCC): ICD-10-CM

## 2021-04-02 DIAGNOSIS — R10.9 INTRACTABLE ABDOMINAL PAIN: ICD-10-CM

## 2021-04-02 RX ORDER — HYDROMORPHONE HYDROCHLORIDE 4 MG/1
8 TABLET ORAL
Qty: 180 TAB | Refills: 0 | Status: SHIPPED | OUTPATIENT
Start: 2021-04-02 | End: 2021-04-17

## 2021-04-02 RX ORDER — OXYCODONE HCL 20 MG/1
40 TABLET, FILM COATED, EXTENDED RELEASE ORAL EVERY 12 HOURS
Qty: 120 TAB | Refills: 0 | Status: SHIPPED | OUTPATIENT
Start: 2021-04-02 | End: 2021-04-17

## 2021-04-02 NOTE — TELEPHONE ENCOUNTER
Triage for Controlled Substance Refill Request     Pain Diagnosis: _Cholangiocarcinoma of biliary tract     Last Outpatient Visit: 3/10/2021     Next Outpatient Visit: 4/8/2021     Reason for refill needed outside of office visit? Pharmacy does not have quantity available.      Pharmacy: _[facility] [address]        Medication:Oxycontin 20 mg   Dose and directions: 2 tab by mouth every 12 hours. Number dispensed:120  Date filled ( or Pharmacy): 3/4/2021  #left:    Medication: Dilaudid 4 mg   Dose and directions: 1 tab every 6 hours and 2 at night.    Number dispensed:180  Date filled ( or Pharmacy):2/24/2021  #left:        reviewed:yes      Date of Urine Drug Screen: n/a      Opioid Safety Handout given: yes      Appropriate for refill:  _yes      Action:  _ yes

## 2021-04-07 ENCOUNTER — HOSPITAL ENCOUNTER (OUTPATIENT)
Dept: INFUSION THERAPY | Age: 66
Discharge: HOME | End: 2021-04-07
Payer: COMMERCIAL

## 2021-04-07 ENCOUNTER — TELEPHONE (OUTPATIENT)
Dept: ONCOLOGY | Age: 66
End: 2021-04-07

## 2021-04-07 VITALS
DIASTOLIC BLOOD PRESSURE: 64 MMHG | HEART RATE: 67 BPM | SYSTOLIC BLOOD PRESSURE: 107 MMHG | TEMPERATURE: 97.5 F | WEIGHT: 153.4 LBS | RESPIRATION RATE: 18 BRPM | BODY MASS INDEX: 23.32 KG/M2

## 2021-04-07 DIAGNOSIS — C22.1 CHOLANGIOCARCINOMA OF BILIARY TRACT (HCC): Primary | ICD-10-CM

## 2021-04-07 LAB
ALBUMIN SERPL-MCNC: 3 G/DL (ref 3.5–5)
ALBUMIN/GLOB SERPL: 1.3 {RATIO} (ref 1.1–2.2)
ALP SERPL-CCNC: 127 U/L (ref 45–117)
ALT SERPL-CCNC: 22 U/L (ref 12–78)
ANION GAP SERPL CALC-SCNC: 4 MMOL/L (ref 5–15)
AST SERPL-CCNC: 11 U/L (ref 15–37)
BILIRUB SERPL-MCNC: 0.4 MG/DL (ref 0.2–1)
BUN SERPL-MCNC: 14 MG/DL (ref 6–20)
BUN/CREAT SERPL: 20 (ref 12–20)
CALCIUM SERPL-MCNC: 7.7 MG/DL (ref 8.5–10.1)
CHLORIDE SERPL-SCNC: 111 MMOL/L (ref 97–108)
CO2 SERPL-SCNC: 24 MMOL/L (ref 21–32)
CREAT SERPL-MCNC: 0.7 MG/DL (ref 0.7–1.3)
GLOBULIN SER CALC-MCNC: 2.4 G/DL (ref 2–4)
GLUCOSE SERPL-MCNC: 167 MG/DL (ref 65–100)
MAGNESIUM SERPL-MCNC: 2 MG/DL (ref 1.6–2.4)
PHOSPHATE SERPL-MCNC: 1.9 MG/DL (ref 2.6–4.7)
POTASSIUM SERPL-SCNC: 3.8 MMOL/L (ref 3.5–5.1)
PROT SERPL-MCNC: 5.4 G/DL (ref 6.4–8.2)
SODIUM SERPL-SCNC: 139 MMOL/L (ref 136–145)

## 2021-04-07 PROCEDURE — 36415 COLL VENOUS BLD VENIPUNCTURE: CPT

## 2021-04-07 PROCEDURE — 83735 ASSAY OF MAGNESIUM: CPT

## 2021-04-07 PROCEDURE — 80053 COMPREHEN METABOLIC PANEL: CPT

## 2021-04-07 PROCEDURE — 74011250636 HC RX REV CODE- 250/636: Performed by: NURSE PRACTITIONER

## 2021-04-07 PROCEDURE — 77030012965 HC NDL HUBR BBMI -A

## 2021-04-07 PROCEDURE — 84100 ASSAY OF PHOSPHORUS: CPT

## 2021-04-07 PROCEDURE — 96360 HYDRATION IV INFUSION INIT: CPT

## 2021-04-07 RX ORDER — SODIUM CHLORIDE 9 MG/ML
10 INJECTION INTRAMUSCULAR; INTRAVENOUS; SUBCUTANEOUS AS NEEDED
Status: CANCELLED | OUTPATIENT
Start: 2021-04-15

## 2021-04-07 RX ORDER — ALBUTEROL SULFATE 0.83 MG/ML
2.5 SOLUTION RESPIRATORY (INHALATION) AS NEEDED
Status: CANCELLED
Start: 2021-04-22

## 2021-04-07 RX ORDER — PALONOSETRON 0.05 MG/ML
0.25 INJECTION, SOLUTION INTRAVENOUS ONCE
Status: CANCELLED | OUTPATIENT
Start: 2021-04-22 | End: 2021-04-22

## 2021-04-07 RX ORDER — HYDROCORTISONE SODIUM SUCCINATE 100 MG/2ML
100 INJECTION, POWDER, FOR SOLUTION INTRAMUSCULAR; INTRAVENOUS AS NEEDED
Status: CANCELLED | OUTPATIENT
Start: 2021-04-22

## 2021-04-07 RX ORDER — DIPHENHYDRAMINE HYDROCHLORIDE 50 MG/ML
25 INJECTION, SOLUTION INTRAMUSCULAR; INTRAVENOUS AS NEEDED
Status: CANCELLED
Start: 2021-04-15

## 2021-04-07 RX ORDER — HEPARIN 100 UNIT/ML
300-500 SYRINGE INTRAVENOUS AS NEEDED
Status: CANCELLED | OUTPATIENT
Start: 2021-04-22

## 2021-04-07 RX ORDER — DIPHENHYDRAMINE HYDROCHLORIDE 50 MG/ML
25 INJECTION, SOLUTION INTRAMUSCULAR; INTRAVENOUS AS NEEDED
Status: CANCELLED
Start: 2021-04-22

## 2021-04-07 RX ORDER — ONDANSETRON 2 MG/ML
8 INJECTION INTRAMUSCULAR; INTRAVENOUS AS NEEDED
Status: CANCELLED | OUTPATIENT
Start: 2021-04-22

## 2021-04-07 RX ORDER — SODIUM CHLORIDE 9 MG/ML
25 INJECTION, SOLUTION INTRAVENOUS CONTINUOUS
Status: CANCELLED | OUTPATIENT
Start: 2021-04-15 | End: 2021-04-15

## 2021-04-07 RX ORDER — SODIUM CHLORIDE 0.9 % (FLUSH) 0.9 %
10 SYRINGE (ML) INJECTION AS NEEDED
Status: CANCELLED | OUTPATIENT
Start: 2021-04-15 | End: 2021-04-15

## 2021-04-07 RX ORDER — EPINEPHRINE 1 MG/ML
0.3 INJECTION, SOLUTION, CONCENTRATE INTRAVENOUS AS NEEDED
Status: CANCELLED | OUTPATIENT
Start: 2021-04-22

## 2021-04-07 RX ORDER — ACETAMINOPHEN 325 MG/1
650 TABLET ORAL AS NEEDED
Status: CANCELLED
Start: 2021-04-22

## 2021-04-07 RX ORDER — EPINEPHRINE 1 MG/ML
0.3 INJECTION, SOLUTION, CONCENTRATE INTRAVENOUS AS NEEDED
Status: CANCELLED | OUTPATIENT
Start: 2021-04-15

## 2021-04-07 RX ORDER — HEPARIN 100 UNIT/ML
300-500 SYRINGE INTRAVENOUS AS NEEDED
Status: CANCELLED | OUTPATIENT
Start: 2021-04-15

## 2021-04-07 RX ORDER — PALONOSETRON 0.05 MG/ML
0.25 INJECTION, SOLUTION INTRAVENOUS ONCE
Status: CANCELLED | OUTPATIENT
Start: 2021-04-15 | End: 2021-04-15

## 2021-04-07 RX ORDER — ONDANSETRON 2 MG/ML
8 INJECTION INTRAMUSCULAR; INTRAVENOUS AS NEEDED
Status: CANCELLED | OUTPATIENT
Start: 2021-04-15

## 2021-04-07 RX ORDER — DIPHENHYDRAMINE HYDROCHLORIDE 50 MG/ML
50 INJECTION, SOLUTION INTRAMUSCULAR; INTRAVENOUS AS NEEDED
Status: CANCELLED
Start: 2021-04-22

## 2021-04-07 RX ORDER — HYDROCORTISONE SODIUM SUCCINATE 100 MG/2ML
100 INJECTION, POWDER, FOR SOLUTION INTRAMUSCULAR; INTRAVENOUS AS NEEDED
Status: CANCELLED | OUTPATIENT
Start: 2021-04-15

## 2021-04-07 RX ORDER — SODIUM CHLORIDE 0.9 % (FLUSH) 0.9 %
10 SYRINGE (ML) INJECTION AS NEEDED
Status: CANCELLED | OUTPATIENT
Start: 2021-04-22 | End: 2021-04-22

## 2021-04-07 RX ORDER — ACETAMINOPHEN 325 MG/1
650 TABLET ORAL AS NEEDED
Status: CANCELLED
Start: 2021-04-15

## 2021-04-07 RX ORDER — ALBUTEROL SULFATE 0.83 MG/ML
2.5 SOLUTION RESPIRATORY (INHALATION) AS NEEDED
Status: CANCELLED
Start: 2021-04-15

## 2021-04-07 RX ORDER — DIPHENHYDRAMINE HYDROCHLORIDE 50 MG/ML
50 INJECTION, SOLUTION INTRAMUSCULAR; INTRAVENOUS AS NEEDED
Status: CANCELLED
Start: 2021-04-15

## 2021-04-07 RX ORDER — SODIUM CHLORIDE 9 MG/ML
10 INJECTION INTRAMUSCULAR; INTRAVENOUS; SUBCUTANEOUS AS NEEDED
Status: CANCELLED | OUTPATIENT
Start: 2021-04-22

## 2021-04-07 RX ORDER — SODIUM CHLORIDE 9 MG/ML
25 INJECTION, SOLUTION INTRAVENOUS CONTINUOUS
Status: CANCELLED | OUTPATIENT
Start: 2021-04-22 | End: 2021-04-22

## 2021-04-07 RX ADMIN — SODIUM CHLORIDE 1000 ML: 900 INJECTION, SOLUTION INTRAVENOUS at 15:38

## 2021-04-07 NOTE — TELEPHONE ENCOUNTER
Returned call to pt. HIPAA verified by two patient identifiers. He reports he is having diarrhea, not able to drink enough, and even with lomotil and imodium he isn't finding relief. Per the note colestipol was not effective. He will come in for fluids and labs today. Will forward to Palliative as well since he has an appt. Tomorrow.

## 2021-04-07 NOTE — PROGRESS NOTES
[facility] Visit Note    Add on appointment:  9985 Pt arrived at [facility] ambulatory and in no distress for IV hydration. Assessment completed. Pt reports diarrhea 5-10 x's per day and legs are \"feeling like I ran a [employer] accessed per protocol. Lab drawn. IV hydration started. Patient Vitals for the past 12 hrs:   Temp Pulse Resp BP   04/07/21 1645  67  107/64   04/07/21 1521 97.5 °F (36.4 °C) 68 18 116/67     Recent Results (from the past 12 hour(s))   METABOLIC PANEL, COMPREHENSIVE    Collection Time: 04/07/21  3:27 PM   Result Value Ref Range    Sodium 139 136 - 145 mmol/L    Potassium 3.8 3.5 - 5.1 mmol/L    Chloride 111 (H) 97 - 108 mmol/L    CO2 24 21 - 32 mmol/L    Anion gap 4 (L) 5 - 15 mmol/L    Glucose 167 (H) 65 - 100 mg/dL    BUN 14 6 - 20 MG/DL    Creatinine 0.70 0.70 - 1.30 MG/DL    BUN/Creatinine ratio 20 12 - 20      GFR est AA >60 >60 ml/min/1.73m2    GFR est non-AA >60 >60 ml/min/1.73m2    Calcium 7.7 (L) 8.5 - 10.1 MG/DL    Bilirubin, total 0.4 0.2 - 1.0 MG/DL    ALT (SGPT) 22 12 - 78 U/L    AST (SGOT) 11 (L) 15 - 37 U/L    Alk. phosphatase 127 (H) 45 - 117 U/L    Protein, total 5.4 (L) 6.4 - 8.2 g/dL    Albumin 3.0 (L) 3.5 - 5.0 g/dL    Globulin 2.4 2.0 - 4.0 g/dL    A-G Ratio 1.3 1.1 - 2.2     MAGNESIUM    Collection Time: 04/07/21  3:27 PM   Result Value Ref Range    Magnesium 2.0 1.6 - 2.4 mg/dL   PHOSPHORUS    Collection Time: 04/07/21  3:27 PM   Result Value Ref Range    Phosphorus 1.9 (L) 2.6 - 4.7 MG/DL       Medications received:  Medications Administered     sodium chloride 0.9 % bolus infusion 1,000 mL     Admin Date  04/07/2021 Action  New Bag Dose  1,000 mL Rate  1,000 mL/hr Route  IntraVENous Administered By  Maura Dejesus, RN                2081 Tolerated treatment well, no adverse reaction noted. Port flushed and de-accessed. D/Cd from [facility] ambulatory and in no distress accompanied by self. Next appt 4/15.

## 2021-04-08 ENCOUNTER — VIRTUAL VISIT (OUTPATIENT)
Dept: PALLATIVE CARE | Age: 66
End: 2021-04-08
Payer: COMMERCIAL

## 2021-04-08 ENCOUNTER — TELEPHONE (OUTPATIENT)
Dept: PALLATIVE CARE | Age: 66
End: 2021-04-08

## 2021-04-08 DIAGNOSIS — K59.1 FUNCTIONAL DIARRHEA: ICD-10-CM

## 2021-04-08 DIAGNOSIS — R10.9 INTRACTABLE ABDOMINAL PAIN: Primary | ICD-10-CM

## 2021-04-08 DIAGNOSIS — R11.2 INTRACTABLE NAUSEA AND VOMITING: ICD-10-CM

## 2021-04-08 DIAGNOSIS — C22.1 CHOLANGIOCARCINOMA OF BILIARY TRACT (HCC): ICD-10-CM

## 2021-04-08 PROCEDURE — 99215 OFFICE O/P EST HI 40 MIN: CPT | Performed by: INTERNAL MEDICINE

## 2021-04-08 RX ORDER — MORPHINE 10 MG/ML
1 TINCTURE ORAL
Qty: 90 ML | Refills: 0 | Status: SHIPPED | OUTPATIENT
Start: 2021-04-08 | End: 2021-04-08 | Stop reason: SDUPTHER

## 2021-04-08 RX ORDER — MORPHINE 10 MG/ML
1 TINCTURE ORAL
Qty: 118 ML | Refills: 0 | Status: SHIPPED | OUTPATIENT
Start: 2021-04-08 | End: 2021-01-01 | Stop reason: SDUPTHER

## 2021-04-08 NOTE — PATIENT INSTRUCTIONS
Dear Antonio Mcleod. , 
 
It was a pleasure seeing you today in your home along with your wife virtually We will see you again in 4 weeks If labs or imaging tests have been ordered for you today, please call the office  at [phone] 48 hours after completion to obtain the results. Your stated goal:  
- better pain management Your described symptoms were: Fatigue: 8 Drowsiness: 4 Depression: 0 Pain: 4 Anxiety: 0 Nausea: 0 Anorexia: 0 Dyspnea: 3 Best Well-Bein Constipation: No  
Other Problem (Comment): 0 This is the plan we talked about: 1. Acute on chronic low back pain from new L5 met status post ablation and kyphoplasty 
-Your acute worsening of back pain after the L5 biopsy has improved somewhat and you are no longer taking Dilaudid 8 mg every 3 hours. - You take Dilaudid 4 mg every 6 hours but you take 2 tabs at night. 
-Continue OxyContin 40 mg 2 times a day. 2.  Chemo induced diarrhea 
-You are recently started on Creon but this has not made a big difference and you continue to have loose watery stools about 10 times per day. You are very dehydrated because of the same.   
- You are on Immodium and Lomotil - You get IVF from [facility] yesterday 
- Start Tincture of Opium 1ml every 8 hours to help with diarrhea. You currently have 8-12 episodes of loose diarrhea per day, the expectation with opium tincture is to reduce this by at least 50%. Please call us early next week to let us know if this is working, if this is not working, I will start you on low-dose budesonide. 
-You are drinking plenty of water but still unable to keep up with the amount of water you are losing from diarrhea. We will arrange for you to get IV fluids once a week in the infusion center. 3.  Sjogren's disease 
-You have a tendency to get dehydrated very quickly.   We talked about this in detail today including how to be proactive and drink more than a liter of water per day, have Chasityde and lemonade at home. Once you start chemotherapy, if you are not able to keep up with your oral fluid intake, please let us know so we can arrange for you to receive IV fluids in the infusion center or even at home if your insurance allows. We talked about the use of dispatch health which is more for urgent care needs only and not for nonurgent needs. It is better to call us so we can arrange the care that you need proactively. 4.  Multivitamin deficiency 
-You were found to have severe vitamin A and vitamin D deficiency. You are on replacement therapy now. Please discuss with your primary care doctor about rechecking the levels and continuing prescription level replacement. 5. We completed an AMD naming your wife as Nikko. 6.  Disease progression 
-We reviewed your scans and plan for chemotherapy in detail. You are tolerating chemotherapy fairly well so far except for the diarrhea. 7.  Anorexia 
-You feel weak. You have desire to eat and eating small frequent meals. Please start drinking Ensure and boost at least 2 bottles per day. 8.  I am glad you are received [employer] COVID-19 vaccine This is what you have shared with us about Advance Care Planning: 
 
  Primary Decision Maker: Mami Nicole Spouse - [phone] Secondary Decision Maker: Francisco Miles - Child - [phone] Supplemental (Other) Decision Maker: Roberto Cristina - Child - [phone] Advance Care Planning 4/8/2021 Patient's Healthcare Decision Maker is: Named in scanned ACP document Primary Decision Maker Name -  
Primary Decision Maker Phone Number -  
Primary Decision Maker Relationship to Patient -  
Confirm Advance Directive Yes, on file Patient Would Like to Complete Advance Directive - Does the patient have other document types - The Palliative Medicine Team is here to support you and your family.   
 
 
Sincerely, 
 
 
Fer Menchaca MD and the Palliative Medicine Team

## 2021-04-08 NOTE — TELEPHONE ENCOUNTER
Josie Hernandez with [facility] at [facility] is calling to get a new script for opium tincture 10 ml. States she needs to order and she needs script to state 118 ml versus 90 ml. Advised nurse would call her back to discuss.

## 2021-04-08 NOTE — PROGRESS NOTES
Palliative Medicine Office Visit  Palliative Medicine Nurse Check In  [phone]    Patient Name: Uriah Fierro YOB: 1955      Date of Office Visit: 4/8/2021    Patient states: \"  \"    From Check In Sheet (scanned in Media):  Has a medical provider talked with you about cardiopulmonary resuscitation (CPR)? [x] Yes   [] No   [] Unable to obtain    Nurse reminder to complete or update ACP FlowSheet:    Is ACP on the Problem List?    [x] Yes    [] No  IF ACP Document is ON FILE; Nurse to place ACP on Problem List     Is there an ACP Note in Chart Review/Note? [x] Yes    [] No   If NO: ALERT PROVIDER       Primary Decision MakerArne Shelby - [phone]    Secondary Decision Maker: Ridge Cristina III - Child - [phone]    Supplemental (Other) Decision Maker: Calos Genoveva Child - [phone]  Advance Care Planning 4/8/2021   Patient's [address] is: Named in scanned ACP document   Primary Decision Maker Name -   Primary Decision Maker Phone Number -   Primary Decision Maker Relationship to Patient -   Confirm Advance Directive Yes, on file   Patient Would Like to Complete Advance Directive -   Does the patient have other document types -       Is there anything that we should know about you as a person in order to provide you the best care possible? Have you been to the ER, urgent care clinic since your last visit? [] Yes   [x] No   [] Unable to obtain    Have you been hospitalized since your last visit? [] Yes   [x] No   [] Unable to obtain    Have you seen or consulted any other health care providers outside of the [address] [place] [address] since your last visit?    [] Yes   [x] No   [] Unable to obtain    Functional status (describe):         Last BM: 4/8/2021     accessed (date): 4/8/2021    Bottle review (for opioid pain medication):  Medication 1:   Date filled:   Directions:   # filled:   # left:   # pills taking per day:  Last dose taken:     Medication 2:   Date filled:   Directions:   # filled:   # left:   # pills taking per day:  Last dose taken:    Medication 3:   Date filled:   Directions:   # filled:   # left:   # pills taking per day:  Last dose taken:    Medication 4:   Date filled:   Directions:   # filled:   # left:   # pills taking per day:  Last dose taken:

## 2021-04-08 NOTE — PROGRESS NOTES
Palliative Medicine Outpatient Services  Lee: [phone]    Patient Name: Huseyin Spain YOB: 1955    Date of Current Visit: 04/08/21  Location of Current Visit:    [] [facility] Office  [] [facility] Office  [] [address] [place] [address]  [] Home  [x] Other: Virtual synchronous A/V visit    Date of Initial Visit: 4/6/2020  Referral from: Dr. Lorenzo Nunez  Primary Care Physician: Jewell Hutchinson DO      SUMMARY:   Huseyin Spain is a 60y.o. 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 Lorenzo Nunez for management of symptoms and psychosocial support. 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 This is second marriage for both of them. Current treatment-completed RT to the para-aortic mass, pursuing diagnostics with GI physician Dr. Wilman Lebron for gastroparesis and pancreatic enzymes, has had biliary stents replaced. Status post celiac plexus block and reversal of Whipple procedure on 9/9/2020    Hospitalization at [facility] for uncontrolled pain in December 2020    L5 biopsy, ablation and kyphoplasty on 1/12/2021    Current treatment-on cisplatin plus gemcitabine plus Abraxane  Worsening chemotherapy-induced diarrhea     PALLIATIVE DIAGNOSES:       ICD-10-CM ICD-9-CM    1. Intractable abdominal pain  R10.9 789.00    2. Cholangiocarcinoma of biliary tract (HCC)  C22.1 155.1    3. Functional diarrhea  K59.1 564.5 opium tincture 10 mg/mL (morphine) liquid   4.  Intractable nausea and vomiting  R11.2 536.2           PLAN:   Patient Instructions     Dear Huseyin Josue. ,    It was a pleasure seeing you today in your home along with your wife virtually    We will see you again in 4 weeks    If labs or imaging tests have been ordered for you today, please call the office  at [phone] 48 hours after completion to obtain the results. Your stated goal:   - better pain management    Your described symptoms were: Fatigue: 8 Drowsiness: 4   Depression: 0 Pain: 4   Anxiety: 0 Nausea: 0   Anorexia: 0 Dyspnea: 3   Best Well-Bein Constipation: No   Other Problem (Comment): 0       This is the plan we talked about:    1. Acute on chronic low back pain from new L5 met status post ablation and kyphoplasty  -Your acute worsening of back pain after the L5 biopsy has improved somewhat and you are no longer taking Dilaudid 8 mg every 3 hours. - You take Dilaudid 4 mg every 6 hours but you take 2 tabs at night.  -Continue OxyContin 40 mg 2 times a day. 2.  Chemo induced diarrhea  -You are recently started on Creon but this has not made a big difference and you continue to have loose watery stools about 10 times per day. You are very dehydrated because of the same.    - You are on Immodium and Lomotil  - You get IV[facility] from [facility] yesterday  - Start Tincture of Opium 1ml every 8 hours to help with diarrhea. You currently have 8-12 episodes of loose diarrhea per day, the expectation with opium tincture is to reduce this by at least 50%. Please call us early next week to let us know if this is working, if this is not working, I will start you on low-dose budesonide.  -You are drinking plenty of water but still unable to keep up with the amount of water you are losing from diarrhea. We will arrange for you to get IV fluids once a week in the infusion center. 3.  Sjogren's disease  -You have a tendency to get dehydrated very quickly.   We talked about this in detail today including how to be proactive and drink more than a liter of water per day, have Gatorade and lemonade at home. Once you start chemotherapy, if you are not able to keep up with your oral fluid intake, please let us know so we can arrange for you to receive IV fluids in the infusion center or even at home if your insurance allows. We talked about the use of dispatch health which is more for urgent care needs only and not for nonurgent needs. It is better to call us so we can arrange the care that you need proactively. 4.  Multivitamin deficiency  -You were found to have severe vitamin A and vitamin D deficiency. You are on replacement therapy now. Please discuss with your primary care doctor about rechecking the levels and continuing prescription level replacement. 5. We completed an AMD naming your wife as Nikko. 6.  Disease progression  -We reviewed your scans and plan for chemotherapy in detail. You are tolerating chemotherapy fairly well so far except for the diarrhea. 7.  Anorexia  -You feel weak. You have desire to eat and eating small frequent meals. Please start drinking Ensure and boost at least 2 bottles per day. 8.  I am glad you are received [place] COVID-19 vaccine     This is what you have shared with us about Advance Care Planning:      Primary Decision Maker: Duyen Burton - [phone]    Secondary Decision Maker: Ridge Cristina III - Child - [phone]    Supplemental (Other) Decision Maker: Yari Aquino Child - [phone]  Advance Care Planning 4/8/2021   Patient's [address] is: Named in scanned ACP document   Primary Decision Maker Name -   Primary Decision Maker Phone Number -   Primary Decision Maker Relationship to Patient -   Confirm Advance Directive Yes, on file   Patient Would Like to Complete Advance Directive -   Does the patient have other document types -           The Palliative Medicine Team is here to support you and your family.        Sincerely,      Hannah Tom, Helene Garcia MD and the Palliative Medicine Team       GOALS OF CARE / TREATMENT PREFERENCES:   [====Goals of Care====]  GOALS OF CARE:  Patient / health care proxy stated goals: See Patient Instructions / Summary    TREATMENT PREFERENCES:   Code Status:  [x] Attempt Resuscitation       [] Do Not Attempt Resuscitation    Advance Care Planning:  [x] The [employer] Interdisciplinary Team has updated the ACP Navigator with Decision Maker and Patient Capacity      Primary Decision MakerJordan Evans - [phone]    Secondary Decision Maker: Ridge Cristina III - Child - [phone]    Supplemental (Other) Decision Maker: Katherine Linda Child - [phone]  Advance Care Planning 4/8/2021   Patient's Healthcare Decision Maker is: Named in scanned ACP document   Primary Decision Maker Name -   Primary Decision [address] Phone Number -   Primary Decision Maker Relationship to Patient -   Confirm Advance Directive Yes, on file   Patient Would Like to Complete Advance Directive -   Does the patient have other document types -       Other:  (If patient appropriate for POST, consider using PALLPOST smart phrase here)    The palliative care team has discussed with patient / health care proxy about goals of care / treatment preferences for patient.  [====Goals of Care====]     PRESCRIPTIONS GIVEN:     Medications Ordered Today   Medications    opium tincture 10 mg/mL (morphine) liquid     Sig: Take 1 mL by mouth every eight (8) hours as needed for Diarrhea for up to 30 days. Max Daily Amount: 3 mL.      Dispense:  90 mL     Refill:  0           FOLLOW UP:     Future Appointments   Date Time Provider [facility] [place]   4/8/2021 12:30 PM Ayala Luis MD [facility] BS AMB   4/15/2021  9:00 AM CHAIR 2 [address] REG   4/15/2021  9:15 AM NIKI Peterson BS AMB   4/22/2021  9:00 AM [place] [facility] NURSE [address] [place] [address] REG   4/29/2021  1:00 PM CHAIR 2 [address] REG   5/6/2021  9:00 AM CHAIR 2 [place] [address] REG   5/13/2021  9:00 AM CHAIR 2 [place] [address] REG   5/27/2021  1:00 PM CHAIR 2 [place] [address] REG   6/24/2021  1:00 PM [facility] CHAIR 2 [facility] REG   7/22/2021  1:00 PM CHAIR 2 [place] [address] REG   8/19/2021  1:00 PM CHAIR 2 [place] [address] REG           PHYSICIANS INVOLVED IN CARE:   Patient Care Team:  Jean Claude Velez DO as PCP - General (Internal Medicine)  Jean Claude Velez DO as PCP - [address] [place] [address] [facility]  Alyssa Mcdonald MD (General Surgery)  Kang Hdz MD (Gastroenterology)  Robbin Kessler MD (Gastroenterology)  Timmy Lombardo MD (Hematology and Oncology)  Anna Emerson MD as Palliative Care (Palliative Medicine)  Sofy Menjivar RN as Benefits Care Manager       HISTORY:   Reviewed patient-completed ESAS and advance care planning form. Reviewed patient record in prescription monitoring program.    CHIEF COMPLAINT: No chief complaint on file. HPI/SUBJECTIVE:    The patient is: [x] Verbal / [] Nonverbal     Patient doing much better overall, pain now better controlled and he is decreased his Dilaudid from 8 mg to 4 mg every 6 hours as needed. He continues to have severe diarrhea, 8-12 episodes of loose watery stools not controlled with Imodium and Lomotil. He has needed frequent IV fluids to keep up with hydration. He is miserable because of continued diarrhea. No blood in stool.    -------    Patient here with his wife. Both are very good historians. Mid upper back pain-much improved since radiation to the periaortic mass. He struggled with pain for several months before it was identified as cancer recurrence. He was started on fentanyl 25 mcg patch which he wants to wean off of. He has made upper and mid zone abdominal pain which comes and goes.   He has not noticed any specific pattern to the pain but usually the pain comes on before vomiting or relieves without vomiting. Sometimes, he vomits food that he ate about 12 to 14 hours ago. No constipation. He has 2 liquid bowel movements every day. He is unable to tolerate eggs or honey. He is getting tests done to evaluate his pancreatic enzymes. He has very good support through his wife. Clinical Pain Assessment (nonverbal scale for nonverbal patients):   [++++ Clinical Pain Assessment++++]  [++++Pain Severity++++]: Pain: 4  [++++Pain Character++++]: cramping  [++++Pain Duration++++]: months  [++++Pain Effect++++]: functional   [++++Pain Factors++++]: none in particular  [++++Pain Frequency++++]: on and off  [++++Pain Location++++]: upper abdomen and mid back  [++++ Clinical Pain Assessment++++]       FUNCTIONAL ASSESSMENT:     Palliative Performance Scale (PPS):  PPS: 70       PSYCHOSOCIAL/SPIRITUAL SCREENING:     Any spiritual / Mosque concerns:  [] Yes /  [x] No    Caregiver Burnout:  [] Yes /  [x] No /  [] No Caregiver Present      Anticipatory grief assessment:   [x] Normal  / [] Maladaptive       ESAS Anxiety: Anxiety: 0    ESAS Depression: Depression: 0       REVIEW OF SYSTEMS:     The following systems were [x] reviewed / [] unable to be reviewed  Systems: constitutional, ears/nose/mouth/throat, respiratory, gastrointestinal, genitourinary, musculoskeletal, integumentary, neurologic, psychiatric, endocrine. Positive findings noted below. Modified ESAS Completed by: provider   Fatigue: 8 Drowsiness: 4   Depression: 0 Pain: 4   Anxiety: 0 Nausea: 0   Anorexia: 0 Dyspnea: 3   Best Well-Bein Constipation: No   Other Problem (Comment): 0          PHYSICAL EXAM:     Wt Readings from Last 3 Encounters:   21 153 lb 6.4 oz (69.6 kg)   21 153 lb (69.4 kg)   21 154 lb (69.9 kg)     There were no vitals taken for this visit.   Last bowel movement: See Nursing Note    Constitutional    [x] Appears well-developed and well-nourished in no apparent distress    [] Abnormal:  Mental status  [x] Alert and awake  [x] Oriented to person/place/time  [x] Able to follow commands  [] Abnormal:   Eyes  [x] EOM normal   [x] Sclera normal   [x] No visible ocular discharge  [] Abnormal:   HENT  [x] Normocephalic, atraumatic  [x] Mouth/Throat: Moist mucous membranes   [x] External Ears normal  [] Abnormal:  Neck  [x] No visualized mass  [] Abnormal:  Pulmonary/Chest   [x] Respiratory effort normal  [x] No visualized signs of difficulty breathing or respiratory distress  [] Abnormal:  Musculoskeletal  [x] Normal gait with no signs of ataxia  [x] Normal range of motion of neck  [] Abnormal:  Neurological:   [x] No facial asymmetry (Cranial nerve 7 motor function)  [x] No gaze palsy  [] Abnormal:   Skin  [x] No significant exanthematous lesions or discoloration noted on facial skin  [] Abnormal:                                  Psychiatric  [x] Normal affect  [x] No hallucinations  [] Abnormal:    Other pertinent observable physical exam findings:    Due to this being a TeleHealth evaluation, many elements of the physical examination are unable to be assessed. HISTORY:     Past Medical History:   Diagnosis Date    Aneurysm ([address] 2008    CEREBRAL    Arrhythmia     Previous a.fib, ablation, NSR now; NO LONGER FOLLOWED BY CARDIOLOGIST.     Autoimmune disease ([address]     SJOGREN'S    Cancer ([address] 2020    COMMON BILE DUCT, ADENOCARCINOMA, SPINE    Diabetes ([address]     NIDDM    Family history of skin cancer     Hypertension     Sepsis [facility] 2017    STENTING TO BILE DUCT    Status post chemotherapy     Stroke [facility]) 2008    brain aneurysm - no deficits    Sun-damaged skin     Sunburn, blistering       Past Surgical History:   Procedure Laterality Date    HX CHOLECYSTECTOMY      HX GI      COLONOSCOPY, POLYPS (BENIGN)    HX GI  10/06/2017    [facility] [place]     HX GI  2020    WHIPPSHAZIA REVISION    HX HEART CATHETERIZATION  2005    Ablation     HX HERNIA REPAIR  12/2020    HERNIA REPAIR  HX ORTHOPAEDIC  2000    Spinal fusion W/ HARDWARE    HX OTHER SURGICAL  11/07/2017    [place] Cath, Dr. Aguila Drain HX OTHER SURGICAL  01/11/2021    RIGHT IJ PORT-A-CATH PLACEMENT     HX VASCULAR ACCESS  2017    PORTACATH - then removed    IR KYPHOPLASTY LUMBAR  1/12/2021    NEUROLOGICAL PROCEDURE UNLISTED  2005    Ting hole washout from cerebral hemorrhage    [place] ABDOMEN SURGERY PROC UNLISTED  10/2017    APRIL      Family History   Problem Relation Age of Onset    Cancer Father         Breast and Colon, MELANOMA    Hypertension Father     Cancer Mother         LEUKEMIA    No Known Problems Sister     Atrial Fibrillation Brother     No Known Problems Sister     No Known Problems Son     No Known Problems Son     Anesth Problems Neg Hx       History reviewed, no pertinent family history. Social History     Tobacco Use    Smoking status: Never Smoker    Smokeless tobacco: Never Used   Substance Use Topics    Alcohol use: Never     Frequency: Never     Comment: very rare     Allergies   Allergen Reactions    Shellfish Derived Anaphylaxis     Soft shell crabs    Morphine Nausea and Vomiting    Pcn [Penicillins] Other (comments)     Pt stated \"always been told PCN\"  Pt tolerated other cephalosporins in the past      Current Outpatient Medications   Medication Sig    opium tincture 10 mg/mL (morphine) liquid Take 1 mL by mouth every eight (8) hours as needed for Diarrhea for up to 30 days. Max Daily Amount: 3 mL.  oxyCODONE ER (OxyCONTIN) 20 mg ER tablet Take 2 Tabs by mouth every twelve (12) hours for 15 days. Max Daily Amount: 80 mg.    HYDROmorphone (DILAUDID) 4 mg tablet Take 2 Tabs by mouth every four (4) hours as needed for Pain for up to 15 days. Max Daily Amount: 48 mg.    diphenoxylate-atropine (LomotiL) 2.5-0.025 mg per tablet Take 1 Tab by mouth three (3) times daily as needed for Diarrhea. Max Daily Amount: 3 Tabs.     OLANZapine (ZyPREXA) 10 mg tablet Take 1 Tab by mouth See Admin Instructions. Take nightly for 4 days starting the evening of chemotherapy.  pantoprazole (PROTONIX) 40 mg tablet TAKE 1 TABLET BY MOUTH EVERY DAY    lidocaine-prilocaine (EMLA) topical cream Apply  to affected area as needed for Pain. 30-45 min prior to treatments    ondansetron (ZOFRAN ODT) 4 mg disintegrating tablet Take 1 Tab by mouth every eight (8) hours as needed for Nausea or Vomiting.  dexAMETHasone (DECADRON) 1 mg tablet Take 2 tablets by mouth twice daily for 14 days    dutasteride (AVODART) 0.5 mg capsule Take 1 Cap by mouth daily.  Creon 36,000-114,000- 180,000 unit cpDR capsule Take 4 Caps by mouth three (3) times daily (with meals). 2-3 caps each meal and 1 cap for snacks (see additional order)    naloxone (NARCAN) 4 mg/actuation nasal spray Use 1 spray intranasally, then discard. Repeat with new spray every 2 min as needed for opioid overdose symptoms, alternating nostrils.  aluminum & magnesium hydroxide-simethicone (Maalox Maximum Strength) 400-400-40 mg/5 mL suspension Take 10 mL by mouth every six (6) hours as needed for Indigestion.  vitamin A (AQUASOL A) 10,000 unit capsule Take 1 Cap by mouth daily.  vitamin E (AQUA GEMS) 400 unit capsule Take 1 Cap by mouth daily.  empagliflozin (Jardiance) 25 mg tablet Take 1 Tab by mouth nightly.  gabapentin (NEURONTIN) 100 mg capsule Take 3 capsules by mouth twice a day.  cyanocobalamin (VITAMIN B12) 1,000 mcg/mL injection INJECT CONTENTS OF ONE VIAL INTRAMUSCULARLY EVERY OTHER WEEK (Patient taking differently: INJECT CONTENTS OF ONE VIAL INTRAMUSCULARLY EVERY OTHER WEEK (FIRST AND 15TH OF EACH MONTH))    tamsulosin (FLOMAX) 0.4 mg capsule TAKE ONE CAPSULE BY MOUTH EVERY EVENING    ramipriL (ALTACE) 5 mg capsule Take 1 Cap by mouth daily. (Patient taking differently: Take 5 mg by mouth nightly.)    loperamide (IMODIUM) 2 mg capsule Take 2-4 mg by mouth as needed for Diarrhea. Give 4 mg after first loose stool.   Give an additional 2 mg after each loose stool as needed up to a maximum of 8 doses (16 mg/day).  lipase-protease-amylase (Creon) 36,000-114,000- 180,000 unit cpDR capsule Take 1 Cap by mouth as needed (for snacks). 2-3 caps each meal and 1 cap for snacks (see additional order)    finasteride (PROSCAR) 5 mg tablet TAKE 1 TABLET BY MOUTH EVERY DAY    sildenafil citrate (VIAGRA) 50 mg tablet Take 1 Tab by mouth as needed (ED).  alpha-D-galactosidase (BEANO PO) Take 1 Tab by mouth two (2) times a day.  cetirizine (ZYRTEC) 10 mg tablet Take 10 mg by mouth nightly.  lidocaine (LIDODERM) 5 % 1 Patch by TransDERmal route every twenty-four (24) hours. Apply patch to the affected area for 12 hours a day and remove for 12 hours a day. (Patient taking differently: 1 Patch by TransDERmal route daily as needed. Apply patch to the affected area for 12 hours a day and remove for 12 hours a day.)    acetaminophen (TYLENOL) 325 mg tablet Take 2 Tabs by mouth every four (4) hours as needed for Pain or Fever (Over the counter medication). No current facility-administered medications for this visit. LAB DATA REVIEWED:     Lab Results   Component Value Date/Time    WBC 1.9 (L) 04/01/2021 09:19 AM    HGB 8.8 (L) 04/01/2021 09:19 AM    PLATELET 107 78/65/8864 09:19 AM     Lab Results   Component Value Date/Time    Sodium 139 04/07/2021 03:27 PM    Potassium 3.8 04/07/2021 03:27 PM    Chloride 111 (H) 04/07/2021 03:27 PM    CO2 24 04/07/2021 03:27 PM    BUN 14 04/07/2021 03:27 PM    Creatinine 0.70 04/07/2021 03:27 PM    Calcium 7.7 (L) 04/07/2021 03:27 PM    Magnesium 2.0 04/07/2021 03:27 PM    Phosphorus 1.9 (L) 04/07/2021 03:27 PM      Lab Results   Component Value Date/Time    Alk.  phosphatase 127 (H) 04/07/2021 03:27 PM    Protein, total 5.4 (L) 04/07/2021 03:27 PM    Albumin 3.0 (L) 04/07/2021 03:27 PM    Globulin 2.4 04/07/2021 03:27 PM     Lab Results   Component Value Date/Time    INR 1.1 09/09/2020 02:15 AM    Prothrombin time 11.5 (H) 09/09/2020 02:15 AM    aPTT 29.9 09/09/2020 02:15 AM      Lab Results   Component Value Date/Time    Iron 31 (L) 01/02/2020 03:24 AM    TIBC 245 (L) 01/02/2020 03:24 AM    Iron % saturation 13 (L) 01/02/2020 03:24 AM    Ferritin 122 01/02/2020 03:24 AM      CT abd 3/1/21  IMPRESSION  1. No significant change in the appearance of pulmonary nodules. 2. No significant change in the appearance of retroperitoneal adenopathy/mass. There is mass effect upon the vasculature, including occlusion of the left renal  vein with collateral vessels in the retroperitoneum, likely unchanged. 3. Incidental findings as above. CT chest 12/29/2020  IMPRESSION:     1. Numerous tiny pulmonary parenchymal nodules right greater than left,  suspicious for metastatic disease. 2. Focal ill-defined opacity in the right upper lobe which may represent  infiltrate. Follow-up recommended, however. 3. Incidental findings in the upper abdomen described earlier same day.      CONTROLLED SUBSTANCES SAFETY ASSESSMENT (IF ON CONTROLLED SUBSTANCES):     Reviewed opioid safety handout:  [x] Yes   [] No  24 hour opioid dose >150mg morphine equivalent/day:  [] Yes   [x] No  Benzodiazepines:  [] Yes   [x] No  Sleep apnea:  [] Yes   [x] No  Urine Toxicology Testing within last 6 months:  [] Yes   [x] No  History of or new aberrant medication taking behaviors:  [] Yes   [x] No  Has Narcan been prescribed [x] Yes   [] No          Total time: 45 minutes   Counseling / coordination time: 40  > 50% counseling / coordination?:   Consent:  He and/or health care decision maker is aware that that he may receive a bill for this telehealth service, depending on his insurance coverage, and has provided verbal consent to proceed: Yes    CPT Codes 17569-35682 for Established Patients may apply to this Telehealth Visit  Pursuant to the emergency declaration under the [address] and the [place] [address] waiver authority and the Coronavirus Preparedness and Response Supplemental Appropriations Act, this Virtual  Visit was conducted, with patient's consent, to reduce the patient's risk of exposure to COVID-19 and provide continuity of care for an established patient. Services were provided through a video synchronous discussion virtually to substitute for in-person clinic visit.

## 2021-04-09 ENCOUNTER — PATIENT OUTREACH (OUTPATIENT)
Dept: OTHER | Age: 66
End: 2021-04-09

## 2021-04-09 NOTE — PROGRESS NOTES
CM  follow up call. Patient with cholangiocarcinoma -Progressive stage IV disease.   HTN, DM, BPH  /  Palliative chemo    Chart review:  Onc and Palliative visits/  Chemo and Fluid bolus visits to [facility]. Contacted  patient for CM follow up services. Verified  and address for HIPAA security. * Feeling better after IVF Wed after J&J vaccine. - dehydration- unrelated to vaccine. * Diarrhea stools- he attributes to \"an overactive pancreas. \"     - Palliative script for opium tincture- getting filled today. - CM reviews effects w/pt   Syncopal/ orthostatic precautions. CM encourages:   hydration; slow position changes; support hose/ LE flexing before standing; stand by in BR; Avoid hot showers. - he will have stand by assists and knows to take position changes slowly to prevent orthostatic hypotension. Expect drowsiness. Limiting activity. - CM also reviews dangers of skin break down with ongoing diarrhea. Use of skin barrier cream in place. No redness or breaks in skin. CM reviews nutrition needs- risk of malabsorption with diarrhea-  Using Glucerna supplements at home. No need for more coupons at this time. * MED CHANGES:  Adding opium tincture for diarrhea. IVF for dehydration as needed. - CM reviews Med Rec. * APTS:  Ongoing chemo and IVF as needed. * Patient Concerns:  Diarrhea  * CM Concerns:  Nutrition depletion/  Risk of falls with medication. * Education/ Resources. Orthostatic precautions/ skin breakdown risks. Importance of protien/nutrition. Will follow for CM services. Next planned outreach:  Magdiel Lindsey -  FU on new medication- screen for skin breakdown/ nutrition needs/ orthostatic risks. Chart Review: Onc fisit 3/25  Treatment:      1. S/P Pancreatoduodenectomy on  10/06/2017  2. Adjuvant monotherapy with Capecitabine - s/p 6 cycles              (2017 - 2018)  3. SBRT RP node/soft tissue 2020  4.  Palliative chemotherapy Cis/Ulster/Abraxane - Cycle 4 Day 1     Mr. Sofie Palomo is receiving palliative chemotherapy. He says he is doing well other than feeling tired and having some lower extremity edema. He says he has a good appetite. He continues to have diarrhea and is taking imodium and lomotil to control this. Visit Vitals  /65   Pulse 89   Temp 97.3 °F (36.3 °C)   Resp 16   Ht 5' 8\" (1.727 m)   Wt 154 lb (69.9 kg)   SpO2 94%   BMI 23.42 kg/m²      Lab Results   Component Value Date/Time     WBC 3.1 (L) 03/25/2021 09:13 AM     Hemoglobin (POC) 10.3 (L) 10/06/2017 01:14 PM     HGB 9.1 (L) 03/25/2021 09:13 AM     Hematocrit (POC) 27 (L) 03/12/2021 09:27 AM     HCT 28.9 (L) 03/25/2021 09:13 AM     PLATELET 959 (L) 75/25/9922 09:13 AM     MCV 94.8 03/25/2021 09:13 AM     Lab Results   Component Value Date/Time     Sodium 137 03/25/2021 09:13 AM     Potassium 4.0 03/25/2021 09:13 AM     Chloride 106 03/25/2021 09:13 AM     CO2 25 03/25/2021 09:13 AM     Anion gap 6 03/25/2021 09:13 AM     Glucose 161 (H) 03/25/2021 09:13 AM     BUN 14 03/25/2021 09:13 AM     Creatinine 0.74 03/25/2021 09:13 AM     BUN/Creatinine ratio 19 03/25/2021 09:13 AM     GFR est AA >60 03/25/2021 09:13 AM     GFR est non-AA >60 03/25/2021 09:13 AM     Calcium 8.4 (L) 03/25/2021 09:13 AM     Bilirubin, total 0.5 03/25/2021 09:13 AM     Alk. phosphatase 183 (H) 03/25/2021 09:13 AM     Protein, total 5.7 (L) 03/25/2021 09:13 AM     Albumin 3.1 (L) 03/25/2021 09:13 AM     Plan:      > Continue chemotherapy with Cis/Ulster/Abraxane  > Continue to follow with palliative medicine  > Continue Olanzapine  > Continue Lomotil and imodium  > start xgeva  > Follow-up in 3 weeks       Palliative VV 4/8  This is the plan we talked about:     1. Acute on chronic low back pain from new L5 met status post ablation and kyphoplasty  -Your acute worsening of back pain after the L5 biopsy has improved somewhat and you are no longer taking Dilaudid 8 mg every 3 hours.   - You take Dilaudid 4 mg every 6 hours but you take 2 tabs at night.  -Continue OxyContin 40 mg 2 times a day.      2. Chemo induced diarrhea  -You are recently started on Creon but this has not made a big difference and you continue to have loose watery stools about 10 times per day. You are very dehydrated because of the same.    - You are on Immodium and Lomotil  - You get IVF from [facility] yesterday  - Start Tincture of Opium 1ml every 8 hours to help with diarrhea. You currently have 8-12 episodes of loose diarrhea per day, the expectation with opium tincture is to reduce this by at least 50%. Please call us early next week to let us know if this is working, if this is not working, I will start you on low-dose budesonide.  -You are drinking plenty of water but still unable to keep up with the amount of water you are losing from diarrhea. We will arrange for you to get IV fluids once a week in the infusion center.     3. Sjogren's disease  -You have a tendency to get dehydrated very quickly. We talked about this in detail today including how to be proactive and drink more than a liter of water per day, have Gatorade and lemonade at home. Once you start chemotherapy, if you are not able to keep up with your oral fluid intake, please let us know so we can arrange for you to receive IV fluids in the infusion center or even at home if your insurance allows. We talked about the use of dispatch health which is more for urgent care needs only and not for nonurgent needs. It is better to call us so we can arrange the care that you need proactively.     4.  Multivitamin deficiency  -You were found to have severe vitamin A and vitamin D deficiency. You are on replacement therapy now. Please discuss with your primary care doctor about rechecking the levels and continuing prescription level replacement.     5.  We completed an AMD naming your wife as mPOA.     6.  Disease progression  -We reviewed your scans and plan for chemotherapy in detail. You are tolerating chemotherapy fairly well so far except for the diarrhea.      7. Anorexia  -You feel weak. You have desire to eat and eating small frequent meals. Please start drinking Ensure and boost at least 2 bottles per day.     8.  I am glad you are received Tom [employer]ao COVID-19 vaccine      PRESCRIPTIONS GIVEN:           Medications Ordered Today   Medications    opium tincture 10 mg/mL (morphine) liquid       Sig: Take 1 mL by mouth every eight (8) hours as needed for Diarrhea for up to 30 days.  Max Daily Amount: 3 mL.       Dispense:  90 mL       Refill:  0

## 2021-04-14 ENCOUNTER — HOSPITAL ENCOUNTER (OUTPATIENT)
Dept: INFUSION THERAPY | Age: 66
End: 2021-04-14

## 2021-04-15 ENCOUNTER — OFFICE VISIT (OUTPATIENT)
Dept: ONCOLOGY | Age: 66
End: 2021-04-15
Payer: COMMERCIAL

## 2021-04-15 ENCOUNTER — HOSPITAL ENCOUNTER (OUTPATIENT)
Dept: INFUSION THERAPY | Age: 66
Discharge: HOME | End: 2021-04-15
Payer: COMMERCIAL

## 2021-04-15 VITALS
DIASTOLIC BLOOD PRESSURE: 65 MMHG | TEMPERATURE: 97.3 F | OXYGEN SATURATION: 100 % | WEIGHT: 156.9 LBS | RESPIRATION RATE: 18 BRPM | HEART RATE: 73 BPM | BODY MASS INDEX: 23.78 KG/M2 | HEIGHT: 68 IN | SYSTOLIC BLOOD PRESSURE: 100 MMHG

## 2021-04-15 VITALS
SYSTOLIC BLOOD PRESSURE: 102 MMHG | HEIGHT: 68 IN | DIASTOLIC BLOOD PRESSURE: 56 MMHG | TEMPERATURE: 97.3 F | HEART RATE: 66 BPM | RESPIRATION RATE: 18 BRPM | WEIGHT: 156.9 LBS | BODY MASS INDEX: 23.78 KG/M2 | OXYGEN SATURATION: 99 %

## 2021-04-15 DIAGNOSIS — C24.9 CHOLANGIOCARCINOMA DETERMINED BY BIOPSY OF BILIARY TRACT (HCC): Primary | ICD-10-CM

## 2021-04-15 DIAGNOSIS — C22.1 CHOLANGIOCARCINOMA OF BILIARY TRACT (HCC): Primary | ICD-10-CM

## 2021-04-15 DIAGNOSIS — C79.51 METASTATIC CANCER TO BONE (HCC): ICD-10-CM

## 2021-04-15 DIAGNOSIS — G89.3 CANCER RELATED PAIN: ICD-10-CM

## 2021-04-15 DIAGNOSIS — R19.7 DIARRHEA DUE TO MALABSORPTION: ICD-10-CM

## 2021-04-15 DIAGNOSIS — K90.9 DIARRHEA DUE TO MALABSORPTION: ICD-10-CM

## 2021-04-15 LAB
ALBUMIN SERPL-MCNC: 2.9 G/DL (ref 3.5–5)
ALBUMIN/GLOB SERPL: 1.2 {RATIO} (ref 1.1–2.2)
ALP SERPL-CCNC: 150 U/L (ref 45–117)
ALT SERPL-CCNC: 28 U/L (ref 12–78)
ANION GAP BLD CALC-SCNC: 15 MMOL/L (ref 10–20)
AST SERPL-CCNC: 15 U/L (ref 15–37)
BASOPHILS # BLD: 0 K/UL (ref 0–0.1)
BASOPHILS NFR BLD: 1 % (ref 0–1)
BILIRUB DIRECT SERPL-MCNC: 0.2 MG/DL (ref 0–0.2)
BILIRUB SERPL-MCNC: 0.7 MG/DL (ref 0.2–1)
BUN BLD-MCNC: 12 MG/DL (ref 9–20)
CA-I BLD-MCNC: 1.19 MMOL/L (ref 1.12–1.32)
CHLORIDE BLD-SCNC: 105 MMOL/L (ref 98–107)
CO2 BLD-SCNC: 23 MMOL/L (ref 21–32)
CREAT BLD-MCNC: 0.7 MG/DL (ref 0.6–1.3)
DIFFERENTIAL METHOD BLD: ABNORMAL
EOSINOPHIL # BLD: 0.1 K/UL (ref 0–0.4)
EOSINOPHIL NFR BLD: 3 % (ref 0–7)
ERYTHROCYTE [DISTWIDTH] IN BLOOD BY AUTOMATED COUNT: 21 % (ref 11.5–14.5)
GLOBULIN SER CALC-MCNC: 2.4 G/DL (ref 2–4)
GLUCOSE BLD-MCNC: 127 MG/DL (ref 65–100)
HCT VFR BLD AUTO: 28.4 % (ref 36.6–50.3)
HCT VFR BLD CALC: 27 % (ref 36.6–50.3)
HGB BLD-MCNC: 8.7 G/DL (ref 12.1–17)
IMM GRANULOCYTES # BLD AUTO: 0 K/UL (ref 0–0.04)
IMM GRANULOCYTES NFR BLD AUTO: 1 % (ref 0–0.5)
LYMPHOCYTES # BLD: 0.5 K/UL (ref 0.8–3.5)
LYMPHOCYTES NFR BLD: 11 % (ref 12–49)
MAGNESIUM SERPL-MCNC: 2.1 MG/DL (ref 1.6–2.4)
MCH RBC QN AUTO: 31.3 PG (ref 26–34)
MCHC RBC AUTO-ENTMCNC: 30.6 G/DL (ref 30–36.5)
MCV RBC AUTO: 102.2 FL (ref 80–99)
MONOCYTES # BLD: 0.8 K/UL (ref 0–1)
MONOCYTES NFR BLD: 17 % (ref 5–13)
NEUTS SEG # BLD: 3.3 K/UL (ref 1.8–8)
NEUTS SEG NFR BLD: 67 % (ref 32–75)
NRBC # BLD: 0 K/UL (ref 0–0.01)
NRBC BLD-RTO: 0 PER 100 WBC
PLATELET # BLD AUTO: 107 K/UL (ref 150–400)
PMV BLD AUTO: 11.4 FL (ref 8.9–12.9)
POTASSIUM BLD-SCNC: 4.2 MMOL/L (ref 3.5–5.1)
PROT SERPL-MCNC: 5.3 G/DL (ref 6.4–8.2)
RBC # BLD AUTO: 2.78 M/UL (ref 4.1–5.7)
RBC MORPH BLD: ABNORMAL
SERVICE CMNT-IMP: ABNORMAL
SODIUM BLD-SCNC: 138 MMOL/L (ref 136–145)
WBC # BLD AUTO: 4.7 K/UL (ref 4.1–11.1)

## 2021-04-15 PROCEDURE — 96413 CHEMO IV INFUSION 1 HR: CPT

## 2021-04-15 PROCEDURE — 74011000258 HC RX REV CODE- 258: Performed by: INTERNAL MEDICINE

## 2021-04-15 PROCEDURE — 86301 IMMUNOASSAY TUMOR CA 19-9: CPT

## 2021-04-15 PROCEDURE — 96417 CHEMO IV INFUS EACH ADDL SEQ: CPT

## 2021-04-15 PROCEDURE — 96375 TX/PRO/DX INJ NEW DRUG ADDON: CPT

## 2021-04-15 PROCEDURE — 74011250636 HC RX REV CODE- 250/636: Performed by: INTERNAL MEDICINE

## 2021-04-15 PROCEDURE — 77030012965 HC NDL HUBR BBMI -A

## 2021-04-15 PROCEDURE — 80047 BASIC METABLC PNL IONIZED CA: CPT

## 2021-04-15 PROCEDURE — 36415 COLL VENOUS BLD VENIPUNCTURE: CPT

## 2021-04-15 PROCEDURE — 85025 COMPLETE CBC W/AUTO DIFF WBC: CPT

## 2021-04-15 PROCEDURE — 83735 ASSAY OF MAGNESIUM: CPT

## 2021-04-15 PROCEDURE — 80076 HEPATIC FUNCTION PANEL: CPT

## 2021-04-15 PROCEDURE — 96367 TX/PROPH/DG ADDL SEQ IV INF: CPT

## 2021-04-15 PROCEDURE — 99215 OFFICE O/P EST HI 40 MIN: CPT | Performed by: INTERNAL MEDICINE

## 2021-04-15 RX ORDER — SODIUM CHLORIDE 9 MG/ML
25 INJECTION, SOLUTION INTRAVENOUS CONTINUOUS
Status: DISPENSED | OUTPATIENT
Start: 2021-04-15 | End: 2021-04-15

## 2021-04-15 RX ORDER — SODIUM CHLORIDE 0.9 % (FLUSH) 0.9 %
10 SYRINGE (ML) INJECTION AS NEEDED
Status: DISPENSED | OUTPATIENT
Start: 2021-04-15 | End: 2021-04-15

## 2021-04-15 RX ORDER — HEPARIN 100 UNIT/ML
300-500 SYRINGE INTRAVENOUS AS NEEDED
Status: ACTIVE | OUTPATIENT
Start: 2021-04-15 | End: 2021-04-15

## 2021-04-15 RX ORDER — SODIUM CHLORIDE 9 MG/ML
10 INJECTION INTRAMUSCULAR; INTRAVENOUS; SUBCUTANEOUS AS NEEDED
Status: ACTIVE | OUTPATIENT
Start: 2021-04-15 | End: 2021-04-15

## 2021-04-15 RX ORDER — PALONOSETRON 0.05 MG/ML
0.25 INJECTION, SOLUTION INTRAVENOUS ONCE
Status: COMPLETED | OUTPATIENT
Start: 2021-04-15 | End: 2021-04-15

## 2021-04-15 RX ADMIN — PALONOSETRON 0.25 MG: 0.05 INJECTION, SOLUTION INTRAVENOUS at 12:15

## 2021-04-15 RX ADMIN — SODIUM CHLORIDE 150 MG: 900 INJECTION, SOLUTION INTRAVENOUS at 11:55

## 2021-04-15 RX ADMIN — SODIUM CHLORIDE 25 ML/HR: 9 INJECTION, SOLUTION INTRAVENOUS at 10:44

## 2021-04-15 RX ADMIN — CISPLATIN 45.3 MG: 1 INJECTION INTRAVENOUS at 14:07

## 2021-04-15 RX ADMIN — Medication 50 ML: at 15:25

## 2021-04-15 RX ADMIN — Medication 500 UNITS: at 15:25

## 2021-04-15 RX ADMIN — DEXAMETHASONE SODIUM PHOSPHATE 12 MG: 4 INJECTION, SOLUTION INTRA-ARTICULAR; INTRALESIONAL; INTRAMUSCULAR; INTRAVENOUS; SOFT TISSUE at 11:55

## 2021-04-15 RX ADMIN — GEMCITABINE 1448 MG: 38 INJECTION, SOLUTION INTRAVENOUS at 13:28

## 2021-04-15 RX ADMIN — PACLITAXEL 181 MG: 100 INJECTION, POWDER, LYOPHILIZED, FOR SUSPENSION INTRAVENOUS at 12:22

## 2021-04-15 RX ADMIN — MAGNESIUM SULFATE HEPTAHYDRATE: 500 INJECTION, SOLUTION INTRAMUSCULAR; INTRAVENOUS at 10:44

## 2021-04-15 NOTE — PROGRESS NOTES
Nasima Fields. is a 60 y.o. male  Chief Complaint   Patient presents with    Follow-up     cholagiocarcinoma    Cancer     1. Have you been to the ER, urgent care clinic since your last visit? Hospitalized since your last visit? No    2. Have you seen or consulted any other health care providers outside of the [address] [place] [address] since your last visit? Include any pap smears or colon screening. No    Received the [employer] [place] COVID vaccine 10 days ago. Per patient, no symptoms/side effects. Both ankles are swollen, had numbness and tingling in both legs but now just numbness and tingling in both feet. Extreme fatigue over the weekend and then on Monday 4/19/21 \"felt better. \"    Had an extremely runny nose and took Nyquil and Dayquil that helped. Top of head blistered. Each treatment he has had a breakout in a different area. Arm once and leg another time.

## 2021-04-15 NOTE — PROGRESS NOTES
Outpatient [facility]fusion [facility] - Chemotherapy Progress Note    3739 - Pt admit to [facility] for C5D1 Abraxane/Gemzar/Cisplatin in stable condition. Assessment completed. Patient denied having any symptoms of COVID-19, i.e. SOB, coughing, fever, or generally not feeling well. Also denies having been exposed to COVID-19 recently or having had any recent contact with family/friend that has a pending COVID test.     L chest port accessed w/o issue with 0.75\" Oli Rodríguez, with positive blood return. Labs drawn per order and sent. Line flushed, clamped, Curos Cap applied to end clave. Chemotherapy Flowsheet 4/15/2021   Cycle C5D1   Date 4/15/2021   Drug / Regimen Cis/Towns/Abraxane   Pre Hydration given   Pre Meds given   Notes given        Patient Vitals for the past 12 hrs:   Temp Pulse Resp BP SpO2   04/15/21 1520  66  (!) 102/56 99 %   04/15/21 0910 97.3 °F (36.3 °C) 73 18 100/65 100 %        Recent Results (from the past 12 hour(s))   CBC WITH AUTOMATED DIFF    Collection Time: 04/15/21  9:19 AM   Result Value Ref Range    WBC 4.7 4.1 - 11.1 K/uL    RBC 2.78 (L) 4.10 - 5.70 M/uL    HGB 8.7 (L) 12.1 - 17.0 g/dL    HCT 28.4 (L) 36.6 - 50.3 %    .2 (H) 80.0 - 99.0 FL    MCH 31.3 26.0 - 34.0 PG    MCHC 30.6 30.0 - 36.5 g/dL    RDW 21.0 (H) 11.5 - 14.5 %    PLATELET 996 (L) 052 - 400 K/uL    MPV 11.4 8.9 - 12.9 FL    NRBC 0.0 0  WBC    ABSOLUTE NRBC 0.00 0.00 - 0.01 K/uL    NEUTROPHILS 67 32 - 75 %    LYMPHOCYTES 11 (L) 12 - 49 %    MONOCYTES 17 (H) 5 - 13 %    EOSINOPHILS 3 0 - 7 %    BASOPHILS 1 0 - 1 %    IMMATURE GRANULOCYTES 1 (H) 0.0 - 0.5 %    ABS. NEUTROPHILS 3.3 1.8 - 8.0 K/UL    ABS. LYMPHOCYTES 0.5 (L) 0.8 - 3.5 K/UL    ABS. MONOCYTES 0.8 0.0 - 1.0 K/UL    ABS. EOSINOPHILS 0.1 0.0 - 0.4 K/UL    ABS. BASOPHILS 0.0 0.0 - 0.1 K/UL    ABS. IMM.  GRANS. 0.0 0.00 - 0.04 K/UL    DF SMEAR SCANNED      RBC COMMENTS ANISOCYTOSIS  2+       HEPATIC FUNCTION PANEL    Collection Time: 04/15/21  9:19 AM   Result Value Ref Range    Protein, total 5.3 (L) 6.4 - 8.2 g/dL    Albumin 2.9 (L) 3.5 - 5.0 g/dL    Globulin 2.4 2.0 - 4.0 g/dL    A-G Ratio 1.2 1.1 - 2.2      Bilirubin, total 0.7 0.2 - 1.0 MG/DL    Bilirubin, direct 0.2 0.0 - 0.2 MG/DL    Alk. phosphatase 150 (H) 45 - 117 U/L    AST (SGOT) 15 15 - 37 U/L    ALT (SGPT) 28 12 - 78 U/L   MAGNESIUM    Collection Time: 04/15/21  9:19 AM   Result Value Ref Range    Magnesium 2.1 1.6 - 2.4 mg/dL   POC CHEM8    Collection Time: 04/15/21  9:24 AM   Result Value Ref Range    Calcium, ionized (POC) 1.19 1.12 - 1.32 mmol/L    Sodium (POC) 138 136 - 145 mmol/L    Potassium (POC) 4.2 3.5 - 5.1 mmol/L    Chloride (POC) 105 98 - 107 mmol/L    CO2 (POC) 23 21 - 32 mmol/L    Anion gap (POC) 15 10 - 20 mmol/L    Glucose (POC) 127 (H) 65 - 100 mg/dL    BUN (POC) 12 9 - 20 mg/dL    Creatinine (POC) 0.7 0.6 - 1.3 mg/dL    GFRAA, POC >60 >60 ml/min/1.73m2    GFRNA, POC >60 >60 ml/min/1.73m2    Hematocrit (POC) 27 (L) 36.6 - 50.3 %    Comment Comment Not Indicated.           Medications:  Medications Administered     0.9% sodium chloride 1,000 mL with potassium chloride 10 mEq, magnesium sulfate 2 g infusion     Admin Date  04/15/2021 Action  Given Dose   Rate  1,000 mL/hr Route  IntraVENous Administered By  Tatiana Lacrosse          0.9% sodium chloride infusion     Admin Date  04/15/2021 Action  New Bag Dose  25 mL/hr Rate  25 mL/hr Route  IntraVENous Administered By  Tatiana Lacrosse          CISplatin (PLATINOL) 45.3 mg in 0.9% sodium chloride 250 mL, overfill volume 25 mL chemo infusion     Admin Date  04/15/2021 Action  New Bag Dose  45.3 mg Rate  320.3 mL/hr Route  IntraVENous Administered By  Tatiana Lacrosse          dexamethasone (DECADRON) 12 mg in 0.9% sodium chloride 50 mL IVPB     Admin Date  04/15/2021 Action  Given Dose  12 mg Route  IntraVENous Administered By  Tatiana Lacrosse          fosaprepitant (EMEND) 150 mg in 0.9% sodium chloride 150 mL IVPB     Admin Date  04/15/2021 Action  Given Dose  150 mg Rate  450 mL/hr Route  IntraVENous Administered By  Lashay Self          gemcitabine (GEMZAR) 1,448 mg in 0.9% sodium chloride 250 mL, overfill volume 25 mL chemo infusion     Admin Date  04/15/2021 Action  New Bag Dose  1,448 mg Rate  626.2 mL/hr Route  IntraVENous Administered By  Lashay Self          heparin (porcine) pf 300-500 Units     Admin Date  04/15/2021 Action  Given Dose  500 Units Route  InterCATHeter Administered By  Bonnie Randle bound (ABRAXANE) 181 mg in 0.9% sodium chloride 36.2 mL chemo infusion     Admin Date  04/15/2021 Action  New Bag Dose  181 mg Rate  72.4 mL/hr Route  IntraVENous Administered By  Lashay Self          palonosetron HCl (ALOXI) injection 0.25 mg     Admin Date  04/15/2021 Action  Given Dose  0.25 mg Route  IntraVENous Administered By  Lashay Self          sodium chloride (NS) flush 10 mL     Admin Date  04/15/2021 Action  Given Dose  50 mL Route  IntraVENous Administered By  Lashay Self                 Pt tolerated treatment well. Port maintained positive blood return throughout treatment, flushed with positive blood return at conclusion, and de-accessed. 1530 - D/c home in no distress. Pt aware of next [facility] appointment scheduled for: 4/22/21 at 0900.        Future Appointments   Date Time Provider Sloane Roach   4/22/2021  9:00 AM [place] INFUSION NURSE 4 [facility] REG   4/29/2021  1:00 PM CHAIR 2 [address] REG   5/6/2021  9:00 AM CHAIR 2 [facility] REG   5/6/2021  9:15 AM Yanely Wall NP [facility] BS AMB   5/6/2021  1:30 PM Tisha Vicente MD [address] [facility] BS AMB   5/13/2021  9:00 AM CHAIR 2 [facility] REG   5/27/2021  1:00 PM CHAIR 2 [address] REG   6/24/2021  1:00 PM [facility] CHAIR 2 [facility] REG   7/22/2021  1:00 PM CHAIR 2 [address] REG   8/19/2021  1:00 PM CHAIR 2 [address] REG

## 2021-04-15 NOTE — PROGRESS NOTES
[address] [facility] [address], [address] [facility], [address] [place] [address], [address] [place] [address]  [phone]    Follow-up Note      Patient: Ranjit Ortiz MRN: [id number]  SSN: [id number]    YOB: 1955  Age: 60 y.o. Sex: male        Diagnosis:     1. 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 S/P Pancreatoduodenectomy on  10/06/2017  2. Adjuvant monotherapy with Capecitabine - s/p 6 cycles   (12/4/2017 - 05/2018)  3. SBRT RP node/soft tissue 04/2020  4. Palliative chemotherapy   Cis/Counce/Abraxane - Cycle 5 Day 1     Subjective:      Ranjit Ortiz is a 60 y.o. male with a diagnosis of extrahepatic cholangiocarcinoma with metastatic to the lungs, bones and retroperitoneal node/soft tissue. He presented to the ED in August 2017 with obstructive jaundice. He was found to have an obstructive lesion in the dital bile duct. The CT showed marked intrahepatic biliary dilation and common bile duct dilation to the level of the mid common bile duct, which ws markedly narrowed. He underwent a stenting procedure followed by spyglass cholangiogram. The brushings revealed a diagnosis of cholangiocarcinoma. He underwent a Whipple procedure on 10/06/2017. He completed 6 cycles of adjuvant Xeloda. PET scan showed increased activity in the soft tissue along the SMA. CT guided biopsy showed local recurrence. He underwent SBRT by Dr. Annette Marshall in April 2020. He was admitted with severe back pain. Repeat CT shows growth of tumor in the L4/L5, lung and RP node/soft tissue. He underwent a CT guided celiac plexus block which has helped the back pain immensely. A biopsy of the L4 vertebrae confirms a diagnosis of metastatic cholangiocarcinoma.      Mr. Tanisha Taylor is receiving palliative chemotherapy. He notes numbness/tingling in his feet, b/l ankle edema, and increased fatigue following his last treatment. He also has a rash on the top of his head that has blistered and scabbed. Diarrhea has improved using opium tincture. Nausea has also improved. Review of Systems:    Constitutional: fatigue  Eyes: negative  Ears, Nose, Mouth, Throat, and Face: negative  Respiratory: negative  Cardiovascular: negative  Gastrointestinal: nausea, diarrhea  Genitourinary:negative  Integument/Breast: scabs on top of head  Hematologic/Lymphatic: negative  Musculoskeletal: B/L ankle edema  Neurological: numbness/tingling B/L feet      Past Medical History:   Diagnosis Date    Aneurysm ([facility] [address] 2008    CEREBRAL    Arrhythmia     Previous a.fib, ablation, NSR now; NO LONGER FOLLOWED BY CARDIOLOGIST.     Autoimmune disease ([facility] [address]     SJOGREN'S    Cancer ([facility] [address] 2020    COMMON BILE DUCT, ADENOCARCINOMA, SPINE    Diabetes ([facility] [address]     NIDDM    Family history of skin cancer     Hypertension     Sepsis ([facility] [address] 2017    STENTING TO BILE DUCT    Status post chemotherapy     Stroke [facility]) 2008    brain aneurysm - no deficits    Sun-damaged skin     Sunburn, blistering      Past Surgical History:   Procedure Laterality Date    HX CHOLECYSTECTOMY      HX GI      COLONOSCOPY, POLYPS (BENIGN)    HX GI  10/06/2017    [facility]     HX GI  2020    WHIPPLE REVISION    HX HEART CATHETERIZATION  2005    Ablation     HX HERNIA REPAIR  12/2020    HERNIA REPAIR    HX ORTHOPAEDIC  2000    Spinal fusion W/ HARDWARE    HX OTHER SURGICAL  11/07/2017    [place] [facility]  01/11/2021    RIGHT IJ PORT-A-CATH PLACEMENT     HX VASCULAR ACCESS  2017    PORTACATH - then removed    IR KYPHOPLASTY LUMBAR  1/12/2021    NEUROLOGICAL PROCEDURE UNLISTED  2005    [place] hole washout from cerebral hemorrhage    [place] ABDOMEN SURGERY PROC UNLISTED  10/2017    APRIL      Family History   Problem Relation Age of Onset    Cancer Father         Breast and Colon, MELANOMA    Hypertension Father     Cancer Mother         LEUKEMIA    No Known Problems Sister     Atrial Fibrillation Brother     No Known Problems Sister     No Known Problems Son     No Known Problems Son     Anesth Problems Neg Hx      Social History     Tobacco Use    Smoking status: Never Smoker    Smokeless tobacco: Never Used   Substance Use Topics    Alcohol use: Never     Frequency: Never     Comment: very rare      Prior to Admission medications    Medication Sig Start Date End Date Taking? Authorizing Provider   opium tincture 10 mg/mL (morphine) liquid Take 1 mL by mouth every six (6) hours as needed for Diarrhea for up to 30 days. Max Daily Amount: 4 mL. 4/8/21 5/8/21 Yes Nicole Harrison MD   oxyCODONE ER (OxyCONTIN) 20 mg ER tablet Take 2 Tabs by mouth every twelve (12) hours for 15 days. Max Daily Amount: 80 mg. 4/2/21 4/17/21 Yes Nicole Harrison MD   diphenoxylate-atropine (LomotiL) 2.5-0.025 mg per tablet Take 1 Tab by mouth three (3) times daily as needed for Diarrhea. Max Daily Amount: 3 Tabs. 3/10/21  Yes Nicole Harrison MD   OLANZapine (ZyPREXA) 10 mg tablet Take 1 Tab by mouth See Admin Instructions. Take nightly for 4 days starting the evening of chemotherapy. 1/29/21  Yes Chevy Blanco MD   pantoprazole (PROTONIX) 40 mg tablet TAKE 1 TABLET BY MOUTH EVERY DAY 1/11/21  Yes Nicole Harrison MD   lidocaine-prilocaine (EMLA) topical cream Apply  to affected area as needed for Pain. 30-45 min prior to treatments 1/7/21  Yes Chevy Blanco MD   ondansetron (ZOFRAN ODT) 4 mg disintegrating tablet Take 1 Tab by mouth every eight (8) hours as needed for Nausea or Vomiting. 1/7/21  Yes Chevy Blanco MD   dexAMETHasone (DECADRON) 1 mg tablet Take 2 tablets by mouth twice daily for 14 days 12/31/20  Yes Elías Wang MD   dutasteride (AVODART) 0.5 mg capsule Take 1 Cap by mouth daily.  12/21/20  Yes Surinder Choi, DO   Creon 36,000-114,000- 180,000 unit cpDR capsule Take 4 Caps by mouth three (3) times daily (with meals). 2-3 caps each meal and 1 cap for snacks (see additional order) 12/21/20  Yes Silas aRya III, DO   naloxone (NARCAN) 4 mg/actuation nasal spray Use 1 spray intranasally, then discard. Repeat with new spray every 2 min as needed for opioid overdose symptoms, alternating nostrils. 12/11/20  Yes Ry Santoyo MD   aluminum & magnesium hydroxide-simethicone (Maalox Maximum Strength) 400-400-40 mg/5 mL suspension Take 10 mL by mouth every six (6) hours as needed for Indigestion. Yes Provider, Historical   vitamin A (AQUASOL A) 10,000 unit capsule Take 1 Cap by mouth daily. 10/26/20  Yes Silas Raya III, DO   vitamin E (AQUA GEMS) 400 unit capsule Take 1 Cap by mouth daily. 10/26/20  Yes Silas Raya III, DO   empagliflozin (Jardiance) 25 mg tablet Take 1 Tab by mouth nightly. 10/20/20  Yes Silas Raya III, DO   gabapentin (NEURONTIN) 100 mg capsule Take 3 capsules by mouth twice a day. 10/20/20  Yes Silas Raya III, DO   cyanocobalamin (VITAMIN B12) 1,000 mcg/mL injection INJECT CONTENTS OF ONE VIAL INTRAMUSCULARLY EVERY OTHER WEEK  Patient taking differently: INJECT CONTENTS OF ONE VIAL INTRAMUSCULARLY EVERY OTHER WEEK (FIRST AND 15TH OF EACH MONTH) 10/20/20  Yes Silas Raya III,    tamsulosin (FLOMAX) 0.4 mg capsule TAKE ONE CAPSULE BY MOUTH EVERY EVENING 10/20/20  Yes Silas Raya III, DO   ramipriL (ALTACE) 5 mg capsule Take 1 Cap by mouth daily. Patient taking differently: Take 5 mg by mouth nightly. 10/20/20  Yes Silas Raya III,    loperamide (IMODIUM) 2 mg capsule Take 2-4 mg by mouth as needed for Diarrhea. Give 4 mg after first loose stool. Give an additional 2 mg after each loose stool as needed up to a maximum of 8 doses (16 mg/day).    Yes Provider, Historical   finasteride (PROSCAR) 5 mg tablet TAKE 1 TABLET BY MOUTH EVERY DAY 4/13/20  Yes Provider, Historical   acetaminophen (TYLENOL) 325 mg tablet Take 2 Tabs by mouth every four (4) hours as needed for Pain or Fever (Over the counter medication). 1/2/20  Yes Massiel Bangura, NP   alpha-D-galactosidase (BEANO PO) Take 1 Tab by mouth two (2) times a day. Yes Other, MD Flynn   cetirizine (ZYRTEC) 10 mg tablet Take 10 mg by mouth nightly. Yes Provider, Historical   HYDROmorphone (DILAUDID) 4 mg tablet Take 2 Tabs by mouth every four (4) hours as needed for Pain for up to 15 days. Max Daily Amount: 48 mg. 4/2/21 4/17/21  Mejia Calhoun MD   lidocaine (LIDODERM) 5 % 1 Patch by TransDERmal route every twenty-four (24) hours. Apply patch to the affected area for 12 hours a day and remove for 12 hours a day. Patient taking differently: 1 Patch by TransDERmal route daily as needed. Apply patch to the affected area for 12 hours a day and remove for 12 hours a day. 10/15/20   Silas Raya III, DO   lipase-protease-amylase (Creon) 36,000-114,000- 180,000 unit cpDR capsule Take 1 Cap by mouth as needed (for snacks).  2-3 caps each meal and 1 cap for snacks (see additional order)    Provider, Historical   sildenafil citrate (VIAGRA) 50 mg tablet Take 1 Tab by mouth as needed (ED). 5/6/19   Lady Salazar III, DO          Allergies   Allergen Reactions    Shellfish Derived Anaphylaxis     Soft shell crabs    Morphine Nausea and Vomiting    Pcn [Penicillins] Other (comments)     Pt stated \"always been told PCN\"  Pt tolerated other cephalosporins in the past           Objective:     Visit Vitals  /65   Pulse 73   Temp 97.3 °F (36.3 °C)   Resp 18   Ht 5' 8\" (1.727 m)   Wt 156 lb 14.4 oz (71.2 kg)   SpO2 100%   BMI 23.86 kg/m²     Pain Scale: /10       Physical Exam:     GENERAL: alert, cooperative, no distress, appears stated age  EYE: negative  LYMPHATIC: Cervical, supraclavicular, and axillary nodes normal.   THROAT & NECK: normal and no erythema or exudates noted. LUNG: clear to auscultation bilaterally  HEART: regular rate and rhythm  ABDOMEN: soft, non-tender  EXTREMITIES: trace ankle edema  SKIN: Scabs on top of head  NEUROLOGIC: negative      Physical exam and ROS has been modified from a prior visit to make it relevant and current      Lab Results   Component Value Date/Time    WBC 4.7 04/15/2021 09:19 AM    Hemoglobin (POC) 10.3 (L) 10/06/2017 01:14 PM    HGB 8.7 (L) 04/15/2021 09:19 AM    Hematocrit (POC) 27 (L) 04/15/2021 09:24 AM    HCT 28.4 (L) 04/15/2021 09:19 AM    PLATELET 646 (L) 30/37/3843 09:19 AM    .2 (H) 04/15/2021 09:19 AM       Lab Results   Component Value Date/Time    Sodium 139 04/07/2021 03:27 PM    Potassium 3.8 04/07/2021 03:27 PM    Chloride 111 (H) 04/07/2021 03:27 PM    CO2 24 04/07/2021 03:27 PM    Anion gap 4 (L) 04/07/2021 03:27 PM    Glucose 167 (H) 04/07/2021 03:27 PM    BUN 14 04/07/2021 03:27 PM    Creatinine 0.70 04/07/2021 03:27 PM    BUN/Creatinine ratio 20 04/07/2021 03:27 PM    GFR est AA >60 04/07/2021 03:27 PM    GFR est non-AA >60 04/07/2021 03:27 PM    Calcium 7.7 (L) 04/07/2021 03:27 PM    Bilirubin, total 0.7 04/15/2021 09:19 AM    Alk. phosphatase 150 (H) 04/15/2021 09:19 AM    Protein, total 5.3 (L) 04/15/2021 09:19 AM    Albumin 2.9 (L) 04/15/2021 09:19 AM    Globulin 2.4 04/15/2021 09:19 AM    A-G Ratio 1.2 04/15/2021 09:19 AM    ALT (SGPT) 28 04/15/2021 09:19 AM    AST (SGOT) 15 04/15/2021 09:19 AM       CT Results (most recent):  Results from Hospital Encounter encounter on 03/01/21   CT ABD PELV W CONT    Narrative EXAM:  CT CHEST W CONT, CT ABD PELV W CONT  INDICATION:  Restaging disease. Intrahepatic bile duct carcinoma  Additional history:  COMPARISON: CT of the chest, abdomen and pelvis, 12/29/2020. CT of the abdomen  and pelvis, 9/20/2020  . TECHNIQUE:   Multislice helical CT was performed from the thoracic inlet to the pubic  symphysis with intravenous contrast administration.  Contiguous 5 mm axial images  were reconstructed and lung and soft tissue windows were generated. Coronal and  sagittal reformations were generated. CT dose reduction was achieved through use of a standardized protocol tailored  for this examination and automatic exposure control for dose modulation. Aleksandra Lester FINDINGS:  CHEST:  Chest wall/thoracic inlet: Within normal limits. Thyroid: Within normal limits. Mediastinum/elli: Within normal limits. Heart/vessels: There is a port in the right chest with a right IJ approach  catheter that terminates in the distal SVC. Calcifications in the coronary  arteries. Lungs/Pleura: There are numerous pulmonary nodules, most pronounced in the right  upper lobe which are not significantly changed. A representative nodule in the  posterior, left lower lobe is unchanged (#66, series 4). No significant change  in the appearance of a nodule in the medial most right lower lobe, posterior to  the right atrium. An area of spiculation/scarring in the medial aspect of the  minor fissure is not significant changed. .  ABDOMEN:  Liver: Pneumobilia. Gallbladder/Biliary: Within normal limits. Spleen: Within normal limits. Pancreas: Postsurgical changes of Whipple. Adrenals: Within normal limits. Kidneys: Left perinephric stranding. There is an exophytic, low-attenuation  lesion projecting off the left kidney of likely no clinical significance,  incompletely characterized. Tiny, low-attenuation lesion in the upper pole the  left kidney of likely no clinical significance, too small accurately  characterize. Peritoneum/Mesenteries: Within normal limits. Extraperitoneum: Soft tissue attenuation surrounds the proximal abdominal aorta  at the level of the renal arteries which is similar to comparison, measuring  approximately 5.3 x 4 cm (#70, series 2).   Gastrointestinal tract: Tiny amount of oral contrast material in the distal  esophagus suggesting either dysmotility or reflux Postsurgical changes of  Whipple. Diverticulosis in the descending and sigmoid colon. Vascular: Calcifications in the abdominal aortic. There is mass effect upon the  aorta from the soft tissue. The left renal vein appears to be completely  obstructed by the retroperitoneal mass. There is collateral venous drainage in  the retroperitoneum  . PELVIS:  Extraperitoneum: Within normal limits. Ureters: Within normal limits. Bladder: Within normal limits. Reproductive System: Calcifications in the prostate. .  MSK:   Slight dextroscoliosis of the thoracic spine. Dextroscoliosis of the lumbar  spine. Posterior pedicle screw and sebastian fixation of L5/S1 with slight anterior  listhesis and vertebroplasty and L5. .    Impression 1. No significant change in the appearance of pulmonary nodules. 2. No significant change in the appearance of retroperitoneal adenopathy/mass. There is mass effect upon the vasculature, including occlusion of the left renal  vein with collateral vessels in the retroperitoneum, likely unchanged. 3. Incidental findings as above. Assessment:     1. 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 Local recurrence in the para-aortic soft tissue - S/P SBRT     Recent CT shows progression of disease in the retroperitoneum, L4, lungs  The disease is unresectable. Treatment will in a palliative intent with a goal to extend life. Receiving palliative chemotherapy   Cis/[place]/Abraxane - Cycle 5 Day 1    Tolerating treatment   + nausea, fatigue  A detailed system by system evaluation of side effect was performed to assess chemotherapy related toxicity.    Blood counts are acceptable. Results reviewed with the patient. Repeat CT chest/Abd/pelvis - 3/1/2021 - stable disease  Repeat [place] bone scan 3/1/2021 - new area in the pelvis. Although in absence of symptoms I am not certain it is truly progression of disease. 2. Cancer related pain    S/P celiac plexus block - done by Dr. Mack Reeves with significant improvement in the pain  Following with Palliative medicine. Taking Oxycodone twice a day and dilaudid 1 every 6 hours      3. Bone metastasis, L4    Nuclear medicine bone scan  Ablation/Kyphoplasty - Dr. Mack Reeves  On Denosumab      4. Nausea, CINV - improved    Aloxi, emend, dexamethasone in OPIC  Olanzapine 10 mg po nightly x 4 starting day of chemotherapy       5. Diarrhea from malabsorption - improved    Using Lomotil and opium tincture      6. Chemotherapy related neuropathy    Stable  Grade I       Plan:     > Continue chemotherapy with Cis/[place]/Abraxane  > Continue to follow with palliative medicine  > Continue Olanzapine  > Continue Lomotil and opium tincture  > Continue xgeva  > Follow-up in 3 weeks        I performed a history and physical examination of the patient and discussed his management with the NPP. I reviewed the NPP note and agree with the documented findings and plan of care. The patient was seen in conjunction with Ms. Daryl Springer. Mr. Avel Angelucci is a gentleman with metastatic cholangiocarcinoma. He is receiving palliative chemotherapy. Exam is normal. The disease is improving. Signed by: Calin Cornelius MD                     April 15, 2021      CC. Rivka Babb MD  CC. Isma Suarez MD  CC. William Whitlock MD  CC. Rosana Menjivar MD  CC.  Bo Lovell MD

## 2021-04-15 NOTE — LETTER
4/15/2021 Patient: Reny Gaytan. YOB: 1955 Date of Visit: 4/15/2021 Professor Carine Lester DO 
[address] [facility] [address] [address] [address] [employer] Dear Professor Carine Lester DO, Thank you for referring Mr. Chelsi Rojas to [address] [place] [address] for evaluation. My notes for this consultation are attached. If you have questions, please do not hesitate to call me. I look forward to following your patient along with you.  
 
 
Sincerely, 
 
Deb Lewis NP

## 2021-04-16 LAB — CANCER AG19-9 SERPL-ACNC: 32 U/ML (ref 0–35)

## 2021-04-19 ENCOUNTER — TELEPHONE (OUTPATIENT)
Dept: ONCOLOGY | Age: 66
End: 2021-04-19

## 2021-04-19 ENCOUNTER — PATIENT MESSAGE (OUTPATIENT)
Dept: ONCOLOGY | Age: 66
End: 2021-04-19

## 2021-04-19 NOTE — TELEPHONE ENCOUNTER
[address] [facility] [place] [facility], [address] [facility]   W: [phone]  F: [phone]      Medical Nutrition Therapy  Nutrition Encounter:    Called and spoke with patient,  explained that RD is available to address nutrition throughout the spectrum of care. Patient with extrahepatic cholangiocarcinoma with mets. He reports he is eating better. He reports diarrhea is better- only going to the bathroom 3-4 times a day which is a significant improvement. He is taking the creon as directed. Reviewed protein sources and encouraged it at each meal and snack . Provided contact information and encouraged him to reach out for any nutrition related questions or concerns.         Chemotherapy Flowsheet 4/15/2021   Cycle C5D1   Date 4/15/2021   Drug / Regimen Cis/[place]/Abraxane   Pre Hydration given   Pre Meds given   Notes given     Wt Readings from Last 5 Encounters:   04/15/21 156 lb 14.4 oz (71.2 kg)   04/15/21 156 lb 14.4 oz (71.2 kg)   04/07/21 153 lb 6.4 oz (69.6 kg)   04/01/21 153 lb (69.4 kg)   03/25/21 154 lb (69.9 kg)         Signed By: Radha Eubanks, [address] [place] [address], [address] [place] [address] Nw

## 2021-04-22 ENCOUNTER — HOSPITAL ENCOUNTER (OUTPATIENT)
Dept: INFUSION THERAPY | Age: 66
Discharge: HOME | End: 2021-04-22
Payer: COMMERCIAL

## 2021-04-22 ENCOUNTER — PATIENT OUTREACH (OUTPATIENT)
Dept: OTHER | Age: 66
End: 2021-04-22

## 2021-04-22 VITALS
HEART RATE: 70 BPM | DIASTOLIC BLOOD PRESSURE: 64 MMHG | BODY MASS INDEX: 23.2 KG/M2 | OXYGEN SATURATION: 100 % | RESPIRATION RATE: 16 BRPM | WEIGHT: 153.1 LBS | HEIGHT: 68 IN | TEMPERATURE: 98.1 F | SYSTOLIC BLOOD PRESSURE: 111 MMHG

## 2021-04-22 DIAGNOSIS — C24.9 CHOLANGIOCARCINOMA DETERMINED BY BIOPSY OF BILIARY TRACT (HCC): Primary | ICD-10-CM

## 2021-04-22 LAB
ANION GAP SERPL CALC-SCNC: 5 MMOL/L (ref 5–15)
BASO+EOS+MONOS # BLD AUTO: 0.5 K/UL (ref 0.2–1.2)
BASO+EOS+MONOS NFR BLD AUTO: 14 % (ref 3.2–16.9)
BUN SERPL-MCNC: 16 MG/DL (ref 6–20)
BUN/CREAT SERPL: 20 (ref 12–20)
CALCIUM SERPL-MCNC: 8 MG/DL (ref 8.5–10.1)
CHLORIDE SERPL-SCNC: 107 MMOL/L (ref 97–108)
CO2 SERPL-SCNC: 24 MMOL/L (ref 21–32)
CREAT SERPL-MCNC: 0.81 MG/DL (ref 0.7–1.3)
DIFFERENTIAL METHOD BLD: ABNORMAL
ERYTHROCYTE [DISTWIDTH] IN BLOOD BY AUTOMATED COUNT: 18.3 % (ref 11.8–15.8)
GLUCOSE SERPL-MCNC: 161 MG/DL (ref 65–100)
HCT VFR BLD AUTO: 27.7 % (ref 36.6–50.3)
HGB BLD-MCNC: 9.1 G/DL (ref 12.1–17)
LYMPHOCYTES # BLD: 0.4 K/UL (ref 0.8–3.5)
LYMPHOCYTES NFR BLD: 13 % (ref 12–49)
MAGNESIUM SERPL-MCNC: 2 MG/DL (ref 1.6–2.4)
MCH RBC QN AUTO: 32.3 PG (ref 26–34)
MCHC RBC AUTO-ENTMCNC: 32.9 G/DL (ref 30–36.5)
MCV RBC AUTO: 98.2 FL (ref 80–99)
NEUTS SEG # BLD: 2.3 K/UL (ref 1.8–8)
NEUTS SEG NFR BLD: 73 % (ref 32–75)
PLATELET # BLD AUTO: 145 K/UL (ref 150–400)
POTASSIUM SERPL-SCNC: 4 MMOL/L (ref 3.5–5.1)
RBC # BLD AUTO: 2.82 M/UL (ref 4.1–5.7)
SODIUM SERPL-SCNC: 136 MMOL/L (ref 136–145)
WBC # BLD AUTO: 3.2 K/UL (ref 4.1–11.1)

## 2021-04-22 PROCEDURE — 80048 BASIC METABOLIC PNL TOTAL CA: CPT

## 2021-04-22 PROCEDURE — 74011250636 HC RX REV CODE- 250/636: Performed by: INTERNAL MEDICINE

## 2021-04-22 PROCEDURE — 96417 CHEMO IV INFUS EACH ADDL SEQ: CPT

## 2021-04-22 PROCEDURE — 74011000258 HC RX REV CODE- 258: Performed by: INTERNAL MEDICINE

## 2021-04-22 PROCEDURE — 83735 ASSAY OF MAGNESIUM: CPT

## 2021-04-22 PROCEDURE — 36415 COLL VENOUS BLD VENIPUNCTURE: CPT

## 2021-04-22 PROCEDURE — 96375 TX/PRO/DX INJ NEW DRUG ADDON: CPT

## 2021-04-22 PROCEDURE — 85025 COMPLETE CBC W/AUTO DIFF WBC: CPT

## 2021-04-22 PROCEDURE — 96413 CHEMO IV INFUSION 1 HR: CPT

## 2021-04-22 PROCEDURE — 77030012965 HC NDL HUBR BBMI -A

## 2021-04-22 RX ORDER — PALONOSETRON 0.05 MG/ML
0.25 INJECTION, SOLUTION INTRAVENOUS ONCE
Status: COMPLETED | OUTPATIENT
Start: 2021-04-22 | End: 2021-04-22

## 2021-04-22 RX ORDER — SODIUM CHLORIDE 0.9 % (FLUSH) 0.9 %
10 SYRINGE (ML) INJECTION AS NEEDED
Status: DISPENSED | OUTPATIENT
Start: 2021-04-22 | End: 2021-04-22

## 2021-04-22 RX ORDER — SODIUM CHLORIDE 9 MG/ML
25 INJECTION, SOLUTION INTRAVENOUS CONTINUOUS
Status: DISPENSED | OUTPATIENT
Start: 2021-04-22 | End: 2021-04-22

## 2021-04-22 RX ORDER — HEPARIN 100 UNIT/ML
300-500 SYRINGE INTRAVENOUS AS NEEDED
Status: ACTIVE | OUTPATIENT
Start: 2021-04-22 | End: 2021-04-22

## 2021-04-22 RX ORDER — SODIUM CHLORIDE 9 MG/ML
10 INJECTION INTRAMUSCULAR; INTRAVENOUS; SUBCUTANEOUS AS NEEDED
Status: ACTIVE | OUTPATIENT
Start: 2021-04-22 | End: 2021-04-22

## 2021-04-22 RX ADMIN — PACLITAXEL 181 MG: 100 INJECTION, POWDER, LYOPHILIZED, FOR SUSPENSION INTRAVENOUS at 11:51

## 2021-04-22 RX ADMIN — HEPARIN 500 UNITS: 100 SYRINGE at 14:12

## 2021-04-22 RX ADMIN — SODIUM CHLORIDE 25 ML/HR: 900 INJECTION, SOLUTION INTRAVENOUS at 10:30

## 2021-04-22 RX ADMIN — CISPLATIN 45.3 MG: 1 INJECTION INTRAVENOUS at 13:03

## 2021-04-22 RX ADMIN — Medication 10 ML: at 14:12

## 2021-04-22 RX ADMIN — SODIUM CHLORIDE 150 MG: 900 INJECTION, SOLUTION INTRAVENOUS at 10:47

## 2021-04-22 RX ADMIN — GEMCITABINE 1448 MG: 38 INJECTION, SOLUTION INTRAVENOUS at 12:24

## 2021-04-22 RX ADMIN — PALONOSETRON 0.25 MG: 0.05 INJECTION, SOLUTION INTRAVENOUS at 10:46

## 2021-04-22 RX ADMIN — SODIUM CHLORIDE 10 ML: 9 INJECTION INTRAMUSCULAR; INTRAVENOUS; SUBCUTANEOUS at 09:15

## 2021-04-22 RX ADMIN — Medication 10 ML: at 09:15

## 2021-04-22 RX ADMIN — DEXAMETHASONE SODIUM PHOSPHATE 12 MG: 4 INJECTION, SOLUTION INTRA-ARTICULAR; INTRALESIONAL; INTRAMUSCULAR; INTRAVENOUS; SOFT TISSUE at 10:47

## 2021-04-22 RX ADMIN — MAGNESIUM SULFATE HEPTAHYDRATE: 500 INJECTION, SOLUTION INTRAMUSCULAR; INTRAVENOUS at 09:24

## 2021-04-22 NOTE — ROUTINE PROCESS
[facility] Progress Note Date: 2021 Name: Corby Choudhury. MRN: [id number] : 1955 Mr. Cristina arrived ambulatory at 0900 and in no distress for C5D8 Cisplatin/Gemzar/Abraxane. Assessment was completed, no acute issues at this time, no new complaints voiced. Covid Screening 1. Do you have any symptoms of COVID-19? SOB, coughing, fever, or generally not feeling well ? no 
2. Have you been exposed to COVID-19 recently? no 
3. Have you had any recent contact with family/friend that has a pending COVID test? no 
 
Venous Access Device Port 20 (Active) Central Line Being Utilized Yes 21 Criteria for Appropriate Use Irritant/vesicant medication 21 Site Assessment Clean, dry, & intact 21 Date of Last Dressing Change 21 Dressing Status New 21 Dressing Type Transparent 21 Action Taken Blood drawn 21 09 Date Accessed (Medial Site) 21 Access Time (Medial Site) 0915 21 Access Needle Size (Site #1) 20 G 21 Access Needle Length (Medial Site) 0.75 inches 21 Positive Blood Return (Medial Site) Yes 21 Action Taken (Medial Site) Blood drawn;Flushed; Infusing 21 Alcohol Cap Used Yes 2100  
 
 + blood return noted, labs drawn and sent. Mr. Lore Tomlinson vitals were reviewed. Patient Vitals for the past 12 hrs: 
 Temp Pulse Resp BP SpO2  
21 1409  70 16 111/64   
21 0909 98.1 °F (36.7 °C) 84 16 105/63 100 % Lab results were obtained and reviewed. Recent Results (from the past 12 hour(s)) CBC WITH 3 PART DIFF Collection Time: 21  9:11 AM  
Result Value Ref Range WBC 3.2 (L) 4.1 - 11.1 K/uL  
 RBC 2.82 (L) 4.10 - 5.70 M/uL HGB 9.1 (L) 12.1 - 17.0 g/dL HCT 27.7 (L) 36.6 - 50.3 % MCV 98.2 80.0 - 99.0 FL  
 MCH 32.3 26.0 - 34.0 PG  
 MCHC 32.9 30.0 - 36.5 g/dL RDW 18.3 (H) 11.8 - 15.8 % PLATELET 300 (L) 971 - 400 K/uL NEUTROPHILS 73 32 - 75 % MIXED CELLS 14 3.2 - 16.9 % LYMPHOCYTES 13 12 - 49 % ABS. NEUTROPHILS 2.3 1.8 - 8.0 K/UL  
 ABS. MIXED CELLS 0.5 0.2 - 1.2 K/uL  
 ABS. LYMPHOCYTES 0.4 (L) 0.8 - 3.5 K/UL  
 DF AUTOMATED METABOLIC PANEL, BASIC Collection Time: 04/22/21  9:11 AM  
Result Value Ref Range Sodium 136 136 - 145 mmol/L Potassium 4.0 3.5 - 5.1 mmol/L Chloride 107 97 - 108 mmol/L  
 CO2 24 21 - 32 mmol/L Anion gap 5 5 - 15 mmol/L Glucose 161 (H) 65 - 100 mg/dL BUN 16 6 - 20 MG/DL Creatinine 0.81 0.70 - 1.30 MG/DL  
 BUN/Creatinine ratio 20 12 - 20 GFR est AA >60 >60 ml/min/1.73m2 GFR est non-AA >60 >60 ml/min/1.73m2 Calcium 8.0 (L) 8.5 - 10.1 MG/DL MAGNESIUM Collection Time: 04/22/21  9:11 AM  
Result Value Ref Range Magnesium 2.0 1.6 - 2.4 mg/dL Patient's labs within parameters for treatment. Pre-medications  were administered as ordered and chemotherapy was initiated. Medication: 
Medications Administered 0.9% sodium chloride 1,000 mL with potassium chloride 10 mEq, magnesium sulfate 2 g infusion Admin Date 
04/22/2021 Action Given Dose Rate 
1,000 mL/hr Route IntraVENous Administered By 
Joey Monique A  
  
  
 0.9% sodium chloride infusion Admin Date 
04/22/2021 Action New Bag Dose 25 mL/hr Rate 25 mL/hr Route IntraVENous Administered By 
Joey FALL  
  
  
 0.9% sodium chloride injection 10 mL Admin Date 
04/22/2021 Action Given Dose 
10 mL Route IntraVENous Administered By 
Stella Lieberman CISplatin (PLATINOL) 45.3 mg in 0.9% sodium chloride 250 mL, overfill volume 25 mL chemo infusion Admin Date 
04/22/2021 Action New Bag Dose 45.3 mg Rate 
320.3 mL/hr Route IntraVENous Administered By 
Joey FALL  
  
  
 dexamethasone (DECADRON) 12 mg in 0.9% sodium chloride 50 mL IVPB Admin Date 
04/22/2021 Action Given Dose 
12 mg Route IntraVENous Administered By 
Accumulate A  
  
  
 fosaprepitant (EMEND) 150 mg in 0.9% sodium chloride 150 mL IVPB Admin Date 
04/22/2021 Action Given Dose 
150 mg Rate 450 mL/hr Route IntraVENous Administered By 
Accumulate A  
  
  
 gemcitabine (GEMZAR) 1,448 mg in 0.9% sodium chloride 250 mL, overfill volume 25 mL chemo infusion Admin Date 
04/22/2021 Action New Bag Dose 1,448 mg Rate 
626.2 mL/hr Route IntraVENous Administered By 
Accumulate A  
  
  
 heparin (porcine) pf 300-500 Units Admin Date 
04/22/2021 Action Given Dose 
500 Units Route InterCATHeter Administered By 
Anjali Matias RN  
  
  
 PACLitaxel-protein bound (ABRAXANE) 181 mg in 0.9% sodium chloride 36.2 mL chemo infusion Admin Date 
04/22/2021 Action New Bag Dose 181 mg Rate 72.4 mL/hr Route IntraVENous Administered By 
Accumulate A  
  
  
 palonosetron HCl (ALOXI) injection 0.25 mg   
 Admin Date 
04/22/2021 Action Given Dose 0.25 mg Route IntraVENous Administered By 
Accumulate A  
  
  
 sodium chloride (NS) flush 10 mL Admin Date 
04/22/2021 Action Given Dose 
10 mL Route IntraVENous Administered By 
Matty Starkey Admin Date 
04/22/2021 Action Given Dose 
10 mL Route IntraVENous Administered By 
Anjali Matias RN Patient port flushed; heparinized; and de-accessed per protocol. Mr. Fabio Maradiaga tolerated treatment well and was discharged from [place] [address] in stable condition at 1415. He is aware of when to return for his next appointment. Future Appointments Date Time Provider Sloane Roach 4/22/2021  9:00 AM [place] INFUSION NURSE 4 [facility] REG  
4/29/2021  1:00 PM CHAIR 2 [address] REG  
5/6/2021  9:00 AM CHAIR 2 [facility] REG  
5/6/2021  9:15 [facility]  
5/11/2021 12:30 PM Igor Joaquin MD [facility] BS AMB  
5/13/2021  9:00 AM CHAIR 2 [facility] REG  
5/27/2021  1:00 PM CHAIR 2 [facility] REG  
6/24/2021  1:00 PM [place]  CHAIR 2 [facility] REG  
7/22/2021  1:00 PM CHAIR 2 [address] REG  
8/19/2021  1:00 PM CHAIR 2 [facility] REG Michelle Forman April 22, 2021

## 2021-04-29 NOTE — PROGRESS NOTES
[facility] Progress Note    Date: 2021    Name: Mary Aquino. MRN: [id number]         : 1955    Mr. Cristina arrived (ambulation) at 1300 and in no distress for Hydration and Xgeva regimen. Assessment was completed, no acute issues at this time. Pt was wearing compression socking due to +1 edema in bilateral lower extremities. Pt reported continued fatigue      Covid Screening    Patient denied having any symptoms of COVID-19, i.e. SOB, coughing, fever, or generally not feeling well. Also denies having been exposed to COVID-19 recently or having had any recent contact with family/friend that has a pending COVID test.      Right chest port accessed without difficulty with 0.75 cagle needle; + blood return noted, labs drawn and sent. Mr. Rajendra Vitale vitals were reviewed. Patient Vitals for the past 12 hrs:   Temp Pulse Resp BP SpO2   21 1418 -- 74 16 108/63 98 %   21 1304 98.8 °F (37.1 °C) 73 16 106/62 94 %       Lab results were obtained and reviewed.   Recent Results (from the past 12 hour(s))   MAGNESIUM    Collection Time: 21  1:08 PM   Result Value Ref Range    Magnesium 1.9 1.6 - 2.4 mg/dL   RENAL FUNCTION PANEL    Collection Time: 21  1:08 PM   Result Value Ref Range    Sodium 135 (L) 136 - 145 mmol/L    Potassium 3.8 3.5 - 5.1 mmol/L    Chloride 107 97 - 108 mmol/L    CO2 20 (L) 21 - 32 mmol/L    Anion gap 8 5 - 15 mmol/L    Glucose 122 (H) 65 - 100 mg/dL    BUN 22 (H) 6 - 20 MG/DL    Creatinine 0.83 0.70 - 1.30 MG/DL    BUN/Creatinine ratio 27 (H) 12 - 20      GFR est AA >60 >60 ml/min/1.73m2    GFR est non-AA >60 >60 ml/min/1.73m2    Calcium 7.9 (L) 8.5 - 10.1 MG/DL    Phosphorus 3.2 2.6 - 4.7 MG/DL    Albumin 3.1 (L) 3.5 - 5.0 g/dL     Pt's corrected Calcium was 8.62       Medication:  Medications Administered     0.9% sodium chloride injection 10 mL     Admin Date  2021 Action  Given Dose  10 mL Route  IntraVENous Administered By  Pauline Klein denosumab (XGEVA) injection 120 mg     Admin Date  04/29/2021 Action  Given Dose  120 mg Route  SubCUTAneous Administered By  Arlys Ill R          heparin (porcine) pf 500 Units     Admin Date  04/29/2021 Action  Given Dose  500 Units Route  IntraVENous Administered By  Arlys Ill R          sodium chloride (NS) flush 5-10 mL     Admin Date  04/29/2021 Action  Given Dose  10 mL Route  IntraVENous Administered By  Sherrye Amass Date  04/29/2021 Action  Given Dose  10 mL Route  IntraVENous Administered By  Arlys Ill R          sodium chloride 0.9 % bolus infusion 1,000 mL     Admin Date  04/29/2021 Action  New Bag Dose  1,000 mL Rate  1,000 mL/hr Route  IntraVENous Administered By  Arlys Ill R                Patient port flushed; heparinized; and de-accessed per protocol. Mr. Selina Contreras tolerated treatment well and was discharged from [place] [address] in stable condition at 1430. He is aware of when to return for his next appointment.     Future Appointments   Date Time Provider Sloane Roach   5/6/2021  9:00 AM CHAIR 2 [address] REG   5/6/2021  9:15 AM Ledy Berkowitz NP ONC BS AMB   5/11/2021 12:30 PM Hernandez Delgado MD [facility] BS AMB   5/13/2021  9:00 AM CHAIR 2 [address] REG   5/27/2021  1:00 PM CHAIR 2 [address] REG   6/24/2021  1:00 PM [facility] CHAIR 2 [address] REG   7/22/2021  1:00 PM CHAIR 2 [place] [address] [address] REG   8/19/2021  1:00 PM CHAIR 2 [address] REG       Tana Garcia  April 29, 2021

## 2021-05-04 NOTE — PROGRESS NOTES
Shilpa Jennings. is a 60 y.o. male here for follow up for cholangiocarcinoma. Treatment today:  Cycle 6 Day 1 Nab-Paclitaxel + Gemcitabine + Cisplatin    1. Have you been to the ER, urgent care clinic since your last visit? Hospitalized since your last visit? no    2. Have you seen or consulted any other health care providers outside of the [address] [place] [address] since your last visit? Include any pap smears or colon screening. No    Pt states the Opium he just got on is really helping him. States it has been a good week.

## 2021-05-04 NOTE — TELEPHONE ENCOUNTER
----- Message from [place]. Crystal Mendez. sent at 5/4/2021  2:11 PM EDT -----  Regarding: Prescription Question  Contact: [phone]  Ton Dobbins,  I need a refill for the  opium tincture. It is really helping decrease the frequency of stools. I use [address] [place] [address] Pharmacy.   Thank you,  Zafar Esposito

## 2021-05-04 NOTE — TELEPHONE ENCOUNTER
Triage for Controlled Substance Refill Request    Pain Diagnosis: _Functional diarrhea     Last Outpatient Visit: _4/8/2021    Next Outpatient Visit: _5/11/2021    Reason for refill needed outside of office visit? -Appointment not scheduled prior to need for scheduled refill      Pharmacy: _[facility] [employer] [facility] ([place], [place]) [address]    Medication:opium tincture 10 mg/mL (morphine) liquid  Dose and directions: Take 1 mL by mouth every six (6) hours as needed for Diarrhea  Number dispensed:118ml   Date filled ( or Pharmacy):4/8/2021  # left:       reviewed: _yes     Date of Urine Drug Screen:  _n/a    Opioid Safety Handout given:  _yes     Appropriate for refill:  _yes     Action:  _ please refill

## 2021-05-06 NOTE — LETTER
5/6/2021 Patient: Erasto Don. YOB: 1955 Date of Visit: 5/6/2021 Professor Carine Lester DO 
[address] [facility] [address] [address] [address] [employer] Dear Professor Carine Lester DO, Thank you for referring Mr. Elier De La Cruz to [address] [place] [address] for evaluation. My notes for this consultation are attached. If you have questions, please do not hesitate to call me. I look forward to following your patient along with you.  
 
 
Sincerely, 
 
Gucci Marion NP

## 2021-05-06 NOTE — PROGRESS NOTES
[facility] Visit Note    1979 Pt arrived at [facility] ambulatory and in no distress for C6D1. Assessment completed, no new complaints voiced. Port accessed per protocol with positive blood return. Line flushed and capped. Pt to MD office for scheduled appointment. Patient Vitals for the past 24 hrs:   Temp Pulse Resp BP SpO2   05/06/21 1545  71  107/63    05/06/21 0910 98 °F (36.7 °C) 75 16 102/61 100 %     1015 Pt returned from MD office. CMP still in process. Recent Results (from the past 24 hour(s))   CBC WITH 3 PART DIFF    Collection Time: 05/06/21  9:22 AM   Result Value Ref Range    WBC 4.8 4.1 - 11.1 K/uL    RBC 2.78 (L) 4.10 - 5.70 M/uL    HGB 9.1 (L) 12.1 - 17.0 g/dL    HCT 28.4 (L) 36.6 - 50.3 %    .2 (H) 80.0 - 99.0 FL    MCH 32.7 26.0 - 34.0 PG    MCHC 32.0 30.0 - 36.5 g/dL    RDW 19.0 (H) 11.8 - 15.8 %    PLATELET 294 (L) 564 - 400 K/uL    NEUTROPHILS 77 (H) 32 - 75 %    MIXED CELLS 15 3.2 - 16.9 %    LYMPHOCYTES 8 (L) 12 - 49 %    ABS. NEUTROPHILS 3.7 1.8 - 8.0 K/UL    ABS. MIXED CELLS 0.7 0.2 - 1.2 K/uL    ABS. LYMPHOCYTES 0.4 (L) 0.8 - 3.5 K/UL    DF AUTOMATED     METABOLIC PANEL, COMPREHENSIVE    Collection Time: 05/06/21  9:22 AM   Result Value Ref Range    Sodium 139 136 - 145 mmol/L    Potassium 3.9 3.5 - 5.1 mmol/L    Chloride 108 97 - 108 mmol/L    CO2 25 21 - 32 mmol/L    Anion gap 6 5 - 15 mmol/L    Glucose 144 (H) 65 - 100 mg/dL    BUN 20 6 - 20 MG/DL    Creatinine 0.71 0.70 - 1.30 MG/DL    BUN/Creatinine ratio 28 (H) 12 - 20      GFR est AA >60 >60 ml/min/1.73m2    GFR est non-AA >60 >60 ml/min/1.73m2    Calcium 7.8 (L) 8.5 - 10.1 MG/DL    Bilirubin, total 0.4 0.2 - 1.0 MG/DL    ALT (SGPT) 22 12 - 78 U/L    AST (SGOT) 14 (L) 15 - 37 U/L    Alk.  phosphatase 132 (H) 45 - 117 U/L    Protein, total 5.5 (L) 6.4 - 8.2 g/dL    Albumin 3.0 (L) 3.5 - 5.0 g/dL    Globulin 2.5 2.0 - 4.0 g/dL    A-G Ratio 1.2 1.1 - 2.2     MAGNESIUM    Collection Time: 05/06/21 9:22 AM   Result Value Ref Range    Magnesium 2.0 1.6 - 2.4 mg/dL       Medications received:  Medications Administered     0.9% sodium chloride 1,000 mL with potassium chloride 10 mEq, magnesium sulfate 2 g infusion     Admin Date  05/06/2021 Action  Given Dose   Rate  1,000 mL/hr Route  IntraVENous Administered By  Charmaine Carpenter RN          0.9% sodium chloride infusion     Admin Date  05/06/2021 Action  New Bag Dose  25 mL/hr Rate  25 mL/hr Route  IntraVENous Administered By  Charmaine Carpenter RN          0.9% sodium chloride injection 10 mL     Admin Date  05/06/2021 Action  Given Dose  10 mL Route  IntraVENous Administered By  Charmaine Carpenter RN          CISplatin (PLATINOL) 45.3 mg in 0.9% sodium chloride 250 mL, overfill volume 25 mL chemo infusion     Admin Date  05/06/2021 Action  New Bag Dose  45.3 mg Rate  320.3 mL/hr Route  IntraVENous Administered By  Moy Wolff RN          dexamethasone (DECADRON) 12 mg in 0.9% sodium chloride 50 mL IVPB     Admin Date  05/06/2021 Action  Given Dose  12 mg Route  IntraVENous Administered By  Charmaine Carpenter RN          fosaprepitant (EMEND) 150 mg in 0.9% sodium chloride 150 mL IVPB     Admin Date  05/06/2021 Action  Given Dose  150 mg Rate  450 mL/hr Route  IntraVENous Administered By  Charmaine Carpenter RN          gemcitabine (GEMZAR) 1,448 mg in 0.9% sodium chloride 250 mL, overfill volume 25 mL chemo infusion     Admin Date  05/06/2021 Action  New Bag Dose  1,448 mg Rate  626.2 mL/hr Route  IntraVENous Administered By  Charmaine Carpenter RN          heparin (porcine) pf 300-500 Units     Admin Date  05/06/2021 Action  Given Dose  500 Units Route  InterCATHeter Administered By  Charmaine Carpenter RN          PACLitaxel-protein bound (ABRAXANE) 181 mg in 0.9% sodium chloride 36.2 mL chemo infusion     Admin Date  05/06/2021 Action  New Bag Dose  181 mg Rate  72.4 mL/hr Route  IntraVENous Administered By  Charmaine Carpenter RN          palonosetron HCl (ALOXI) injection 0.25 mg     Admin Date  05/06/2021 Action  Given Dose  0.25 mg Route  IntraVENous Administered By  Amy Romero RN                0898 Tolerated treatment well, no adverse reaction noted. Port flushed and de-accessed. D/Cd from [facility] ambulatory and in no distress accompanied by self. Next appt 5/13.

## 2021-05-06 NOTE — PROGRESS NOTES
[facility]  at [address] [place] [address], [address] [place] [address], [address] [facility]  [phone]    Follow-up Note      Patient: Micheal Rodarte MRN: [id number]  SSN: [id number]    YOB: 1955  Age: 60 y.o. Sex: male        Diagnosis:     1. 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 S/P Pancreatoduodenectomy on  10/06/2017  2. Adjuvant monotherapy with Capecitabine - s/p 6 cycles   (12/4/2017 - 05/2018)  3. SBRT RP node/soft tissue 04/2020  4. Palliative chemotherapy   Cis/Satartia/Abraxane - Cycle 6 Day 1     Subjective:      Micheal Rodarte is a 60 y.o. male with a diagnosis of extrahepatic cholangiocarcinoma with metastatic to the lungs, bones and retroperitoneal node/soft tissue. He presented to the ED in August 2017 with obstructive jaundice. He was found to have an obstructive lesion in the dital bile duct. The CT showed marked intrahepatic biliary dilation and common bile duct dilation to the level of the mid common bile duct, which ws markedly narrowed. He underwent a stenting procedure followed by spyglass cholangiogram. The brushings revealed a diagnosis of cholangiocarcinoma. He underwent a Whipple procedure on 10/06/2017. He completed 6 cycles of adjuvant Xeloda. PET scan showed increased activity in the soft tissue along the SMA. CT guided biopsy showed local recurrence. He underwent SBRT by Dr. Abraham Cerna in April 2020. He was admitted with severe back pain. Repeat CT shows growth of tumor in the L4/L5, lung and RP node/soft tissue. He underwent a CT guided celiac plexus block which has helped the back pain immensely. A biopsy of the L4 vertebrae confirms a diagnosis of metastatic cholangiocarcinoma.      Mr. Obinna Graham is receiving palliative chemotherapy. He reports his diarrhea is controlled with tincture of opium. 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 Review of Systems:    Constitutional: fatigue  Eyes: negative  Ears, Nose, Mouth, Throat, and Face: negative  Respiratory: negative  Cardiovascular: negative  Gastrointestinal: nausea, diarrhea  Genitourinary:negative  Integument/Breast: scabs on top of head  Hematologic/Lymphatic: negative  Musculoskeletal: B/L ankle edema  Neurological: numbness/tingling B/L feet      Past Medical History:   Diagnosis Date    Aneurysm ([facility] 2008    CEREBRAL    Arrhythmia     Previous a.fib, ablation, NSR now; NO LONGER FOLLOWED BY CARDIOLOGIST.     Autoimmune disease ([facility]     SJOGREN'S    Cancer ([facility] 2020    COMMON BILE DUCT, ADENOCARCINOMA, SPINE    Diabetes ([facility]     NIDDM    Family history of skin cancer     Hypertension     Sepsis ([facility] 2017    STENTING TO BILE DUCT    Status post chemotherapy     Stroke [facility]) 2008    brain aneurysm - no deficits    Sun-damaged skin     Sunburn, blistering      Past Surgical History:   Procedure Laterality Date    HX CHOLECYSTECTOMY      HX GI      COLONOSCOPY, POLYPS (BENIGN)    [facility]     HX GI  2020    WHIPPLE REVISION    HX HEART CATHETERIZATION  2005    Ablation     HX HERNIA REPAIR  12/2020    HERNIA REPAIR    HX ORTHOPAEDIC  2000    Spinal fusion W/ HARDWARE    HX OTHER SURGICAL  11/07/2017    [place] Cath, Dr. Marck Childress HX OTHER SURGICAL  01/11/2021    RIGHT IJ PORT-A-CATH PLACEMENT     HX VASCULAR ACCESS  2017    PORTACATH - then removed    IR KYPHOPLASTY LUMBAR  1/12/2021    NEUROLOGICAL PROCEDURE UNLISTED  2005    [place] hole washout from cerebral hemorrhage    IN ABDOMEN SURGERY PROC UNLISTED  10/2017    APRIL      Family History   Problem Relation Age of Onset    Cancer Father         Breast and Colon, MELANOMA    Hypertension Father     Cancer Mother         LEUKEMIA    No Known Problems Sister    [facility] Atrial Fibrillation Brother     No Known Problems Sister     No Known Problems Son     No Known Problems Son     Anesth Problems Neg Hx      Social History     Tobacco Use    Smoking status: Never Smoker    Smokeless tobacco: Never Used   Substance Use Topics    Alcohol use: Never     Frequency: Never     Comment: very rare      Prior to Admission medications    Medication Sig Start Date End Date Taking? Authorizing Provider   opium tincture 10 mg/mL (morphine) liquid Take 1 mL by mouth every six (6) hours as needed for Diarrhea for up to 30 days. Max Daily Amount: 4 mL. 5/4/21 6/3/21 Yes Negrita Brasher MD   diphenoxylate-atropine (LomotiL) 2.5-0.025 mg per tablet Take 1 Tab by mouth three (3) times daily as needed for Diarrhea. Max Daily Amount: 3 Tabs. 4/27/21  Yes Negrita Brasher MD   OLANZapine (ZyPREXA) 10 mg tablet Take 1 Tab by mouth See Admin Instructions. Take nightly for 4 days starting the evening of chemotherapy. 1/29/21  Yes Augustin Hutchinson MD   pantoprazole (PROTONIX) 40 mg tablet TAKE 1 TABLET BY MOUTH EVERY DAY 1/11/21  Yes Tramaine Sparrow MD   lidocaine-prilocaine (EMLA) topical cream Apply  to affected area as needed for Pain. 30-45 min prior to treatments 1/7/21  Yes Augustin Hutchinson MD   ondansetron (ZOFRAN ODT) 4 mg disintegrating tablet Take 1 Tab by mouth every eight (8) hours as needed for Nausea or Vomiting. 1/7/21  Yes Augustin Hutchinson MD   dexAMETHasone (DECADRON) 1 mg tablet Take 2 tablets by mouth twice daily for 14 days 12/31/20  Yes Conchis Nova MD   dutasteride (AVODART) 0.5 mg capsule Take 1 Cap by mouth daily. 12/21/20  Yes Yolanda Granados DO   Creon 36,000-114,000- 180,000 unit cpDR capsule Take 4 Caps by mouth three (3) times daily (with meals). 2-3 caps each meal and 1 cap for snacks (see additional order) 12/21/20  Yes Silas Raya III, DO   naloxone (NARCAN) 4 mg/actuation nasal spray Use 1 spray intranasally, then discard.  Repeat with new spray every 2 min as needed for opioid overdose symptoms, alternating nostrils. 12/11/20  Yes Bhargavi Dominguez MD   aluminum & magnesium hydroxide-simethicone (Maalox Maximum Strength) 400-400-40 mg/5 mL suspension Take 10 mL by mouth every six (6) hours as needed for Indigestion. Yes Provider, Historical   vitamin A (AQUASOL A) 10,000 unit capsule Take 1 Cap by mouth daily. 10/26/20  Yes Silas Raya III, DO   vitamin E (AQUA GEMS) 400 unit capsule Take 1 Cap by mouth daily. 10/26/20  Yes Silas Raya III, DO   empagliflozin (Jardiance) 25 mg tablet Take 1 Tab by mouth nightly. 10/20/20  Yes Silas Raya III, DO   gabapentin (NEURONTIN) 100 mg capsule Take 3 capsules by mouth twice a day. 10/20/20  Yes Silas Raya III, DO   cyanocobalamin (VITAMIN B12) 1,000 mcg/mL injection INJECT CONTENTS OF ONE VIAL INTRAMUSCULARLY EVERY OTHER WEEK  Patient taking differently: INJECT CONTENTS OF ONE VIAL INTRAMUSCULARLY EVERY OTHER WEEK (FIRST AND 15TH OF EACH MONTH) 10/20/20  Yes Silas Raya III, DO   tamsulosin (FLOMAX) 0.4 mg capsule TAKE ONE CAPSULE BY MOUTH EVERY EVENING 10/20/20  Yes Silas Raya III, DO   ramipriL (ALTACE) 5 mg capsule Take 1 Cap by mouth daily. Patient taking differently: Take 5 mg by mouth nightly. 10/20/20  Yes Silas Raya III, DO   lidocaine (LIDODERM) 5 % 1 Patch by TransDERmal route every twenty-four (24) hours. Apply patch to the affected area for 12 hours a day and remove for 12 hours a day. Patient taking differently: 1 Patch by TransDERmal route daily as needed. Apply patch to the affected area for 12 hours a day and remove for 12 hours a day. 10/15/20  Yes Silas Raya III, DO   loperamide (IMODIUM) 2 mg capsule Take 2-4 mg by mouth as needed for Diarrhea. Give 4 mg after first loose stool. Give an additional 2 mg after each loose stool as needed up to a maximum of 8 doses (16 mg/day).    Yes Provider, Historical   lipase-protease-amylase (Creon) 36,000-114,000- 180,000 unit cpDR capsule Take 1 Cap by mouth as needed (for snacks). 2-3 caps each meal and 1 cap for snacks (see additional order)   Yes Provider, Historical   finasteride (PROSCAR) 5 mg tablet TAKE 1 TABLET BY MOUTH EVERY DAY 4/13/20  Yes Provider, Historical   acetaminophen (TYLENOL) 325 mg tablet Take 2 Tabs by mouth every four (4) hours as needed for Pain or Fever (Over the counter medication). 1/2/20  Yes Mary Anne Mendenhall NP   sildenafil citrate (VIAGRA) 50 mg tablet Take 1 Tab by mouth as needed (ED). 5/6/19  Yes Silas Raya III, DO   alpha-D-galactosidase (BEANO PO) Take 1 Tab by mouth two (2) times a day. Yes Other, MD Flynn   cetirizine (ZYRTEC) 10 mg tablet Take 10 mg by mouth nightly. Yes Provider, Historical          Allergies   Allergen Reactions    Shellfish Derived Anaphylaxis     Soft shell crabs    Morphine Nausea and Vomiting    Pcn [Penicillins] Other (comments)     Pt stated \"always been told PCN\"  Pt tolerated other cephalosporins in the past           Objective:     Visit Vitals  /61   Pulse 75   Temp 98 °F (36.7 °C)   Resp 16   Ht 5' 8\" (1.727 m)   Wt 156 lb (70.8 kg)   SpO2 100%   BMI 23.72 kg/m²     Pain Scale: 0 - No pain/10       Physical Exam:     GENERAL: alert, cooperative, no distress, appears stated age  EYE: negative  LYMPHATIC: Cervical, supraclavicular, and axillary nodes normal.   THROAT & NECK: normal and no erythema or exudates noted. LUNG: clear to auscultation bilaterally  HEART: regular rate and rhythm  ABDOMEN: soft, non-tender  EXTREMITIES: trace ankle edema  SKIN: Scabs on top of head  NEUROLOGIC: negative    Physical exam was pulled in from a previous visit. No changes were made d/t physical exam remains the same.       Lab Results   Component Value Date/Time    WBC 4.8 05/06/2021 09:22 AM    Hemoglobin (POC) 10.3 (L) 10/06/2017 01:14 PM    HGB 9.1 (L) 05/06/2021 09:22 AM Hematocrit (POC) 27 (L) 04/15/2021 09:24 AM    HCT 28.4 (L) 05/06/2021 09:22 AM    PLATELET 247 (L) 00/15/3214 09:22 AM    .2 (H) 05/06/2021 09:22 AM       Lab Results   Component Value Date/Time    Sodium 139 05/06/2021 09:22 AM    Potassium 3.9 05/06/2021 09:22 AM    Chloride 108 05/06/2021 09:22 AM    CO2 25 05/06/2021 09:22 AM    Anion gap 6 05/06/2021 09:22 AM    Glucose 144 (H) 05/06/2021 09:22 AM    BUN 20 05/06/2021 09:22 AM    Creatinine 0.71 05/06/2021 09:22 AM    BUN/Creatinine ratio 28 (H) 05/06/2021 09:22 AM    GFR est AA >60 05/06/2021 09:22 AM    GFR est non-AA >60 05/06/2021 09:22 AM    Calcium 7.8 (L) 05/06/2021 09:22 AM    Bilirubin, total 0.4 05/06/2021 09:22 AM    Alk. phosphatase 132 (H) 05/06/2021 09:22 AM    Protein, total 5.5 (L) 05/06/2021 09:22 AM    Albumin 3.0 (L) 05/06/2021 09:22 AM    Globulin 2.5 05/06/2021 09:22 AM    A-G Ratio 1.2 05/06/2021 09:22 AM    ALT (SGPT) 22 05/06/2021 09:22 AM    AST (SGOT) 14 (L) 05/06/2021 09:22 AM       CT Results (most recent):  Results from Hospital Encounter encounter on 03/01/21   CT ABD PELV W CONT    Narrative EXAM:  CT CHEST W CONT, CT ABD PELV W CONT  INDICATION:  Restaging disease. Intrahepatic bile duct carcinoma  Additional history:  COMPARISON: CT of the chest, abdomen and pelvis, 12/29/2020. CT of the abdomen  and pelvis, 9/20/2020  . TECHNIQUE:   Multislice helical CT was performed from the thoracic inlet to the pubic  symphysis with intravenous contrast administration. Contiguous 5 mm axial images  were reconstructed and lung and soft tissue windows were generated. Coronal and  sagittal reformations were generated. CT dose reduction was achieved through use of a standardized protocol tailored  for this examination and automatic exposure control for dose modulation. Brenda Childs FINDINGS:  CHEST:  Chest wall/thoracic inlet: Within normal limits. Thyroid: Within normal limits. Mediastinum/elli: Within normal limits. Heart/vessels:  There is a port in the right chest with a right IJ approach  catheter that terminates in the distal SVC. Calcifications in the coronary  arteries. Lungs/Pleura: There are numerous pulmonary nodules, most pronounced in the right  upper lobe which are not significantly changed. A representative nodule in the  posterior, left lower lobe is unchanged (#66, series 4). No significant change  in the appearance of a nodule in the medial most right lower lobe, posterior to  the right atrium. An area of spiculation/scarring in the medial aspect of the  minor fissure is not significant changed. .  ABDOMEN:  Liver: Pneumobilia. Gallbladder/Biliary: Within normal limits. Spleen: Within normal limits. Pancreas: Postsurgical changes of Whipple. Adrenals: Within normal limits. Kidneys: Left perinephric stranding. There is an exophytic, low-attenuation  lesion projecting off the left kidney of likely no clinical significance,  incompletely characterized. Tiny, low-attenuation lesion in the upper pole the  left kidney of likely no clinical significance, too small accurately  characterize. Peritoneum/Mesenteries: Within normal limits. Extraperitoneum: Soft tissue attenuation surrounds the proximal abdominal aorta  at the level of the renal arteries which is similar to comparison, measuring  approximately 5.3 x 4 cm (#70, series 2). Gastrointestinal tract: Tiny amount of oral contrast material in the distal  esophagus suggesting either dysmotility or reflux Postsurgical changes of  Whipple. Diverticulosis in the descending and sigmoid colon. Vascular: Calcifications in the abdominal aortic. There is mass effect upon the  aorta from the soft tissue. The left renal vein appears to be completely  obstructed by the retroperitoneal mass. There is collateral venous drainage in  the retroperitoneum  . PELVIS:  Extraperitoneum: Within normal limits. Ureters: Within normal limits. Bladder: Within normal limits.   Reproductive System: Calcifications in the prostate. .  MSK:   Slight dextroscoliosis of the thoracic spine. Dextroscoliosis of the lumbar  spine. Posterior pedicle screw and sebastian fixation of L5/S1 with slight anterior  listhesis and vertebroplasty and L5. .    Impression 1. No significant change in the appearance of pulmonary nodules. 2. No significant change in the appearance of retroperitoneal adenopathy/mass. There is mass effect upon the vasculature, including occlusion of the left renal  vein with collateral vessels in the retroperitoneum, likely unchanged. 3. Incidental findings as above. Assessment:     1. 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 Local recurrence in the para-aortic soft tissue - S/P SBRT     Recent CT shows progression of disease in the retroperitoneum, L4, lungs  The disease is unresectable. Treatment will in a palliative intent with a goal to extend life. Receiving palliative chemotherapy   Cis/Real/Abraxane - Cycle 6 Day 1    Tolerating treatment   + nausea, fatigue  A detailed system by system evaluation of side effect was performed to assess chemotherapy related toxicity. Blood counts are acceptable. Results reviewed with the patient. Repeat CT chest/Abd/pelvis - 3/1/2021 - stable disease  Repeat NM bone scan 3/1/2021 - new area in the pelvis. Although in absence of symptoms I am not certain it is truly progression of disease. 2. Cancer related pain    S/P celiac plexus block - done by Dr. Tania Irby with significant improvement in the pain  Following with Palliative medicine.  Taking Oxycodone twice a day and dilaudid 1 every 6 hours      3. Bone metastasis, L4    Nuclear medicine bone scan  Ablation/Kyphoplasty - Dr. Veronica Alfred  On Denosumab      4. Nausea, CINV - improved    Aloxi, emend, dexamethasone in OPIC  Olanzapine 10 mg po nightly x 4 starting day of chemotherapy       5. Diarrhea from malabsorption - improved    Using Lomotil and opium tincture      6. Chemotherapy related neuropathy    Stable  Grade I       Plan:     > Continue chemotherapy with Cis/Stearns/Abraxane  > Continue to follow with palliative medicine  > Continue Olanzapine  > Continue Lomotil and opium tincture  > Repeat scans the first week of June  > Continue xgeva  > Follow-up in 3 weeks      I performed a history and physical examination of the patient and discussed his management with the NPP. I reviewed the NPP note and agree with the documented findings and plan of care. The patient was seen in conjunction with Ms. Camacho. Mr. Isidoro Meyers is a gentleman with metastatic cholangiocarcinoma. He is receiving systemic therapy with good control on the disease. He feels well. His physical exam is remarkable for Gr 1 sensory neuropathy in hands. Signed by: Govind Spring MD                     May 6, 2021      CC. Kimo Orozco MD  CC. Shashi Suarez MD  CC. Valeria Hunter MD  CC. Isra Chavez MD  CC.  Donal Talley MD

## 2021-05-11 NOTE — PATIENT INSTRUCTIONS
Dear Lissa Becerra. , 
 
It was a pleasure seeing you today in your home along with your wife virtually We will see you again in 4 weeks If labs or imaging tests have been ordered for you today, please call the office  at [phone] 48 hours after completion to obtain the results. Your stated goal:  
- better pain management Your described symptoms were: Fatigue: 7 Drowsiness: 7 Depression: 0 Pain: 5 Anxiety: 0 Nausea: 0 Anorexia: 0 Dyspnea: 5 Best Well-Bein Constipation: No  
Other Problem (Comment): 0 This is the plan we talked about: 1. Chronic low back pain from new L5 met status post ablation and kyphoplasty 
-Your back pain is much better now, you are now only on OxyContin 40 mg 2 times a day, you are no longer taking Dilaudid. You were taking Dilaudid 8 mg every 3 hours as needed. You have several Dilaudid tablets left at home and you will take between 2 and 4 mg every 3 hours as needed should you need anything for breakthrough pain. 2.  Chemo induced diarrhea 
-You are recently started on Creon but this has not made a big difference and you continue to have loose watery stools about 10 times per day. You are very dehydrated because of the same.   
- You are on Immodium and Lomotil as needed 
-We started you on opium tincture 1 mL every 8 hours and this has been very helpful. You now only have 4 bouts of diarrhea per day and this is improved your quality of life significantly. Once in a while you take Imodium along with opium tincture. 3.  Sjogren's disease 
-You have a tendency to get dehydrated very quickly. We talked about this in detail today including how to be proactive and drink more than a liter of water per day, have Gatorade and lemonade at home.   Once you start chemotherapy, if you are not able to keep up with your oral fluid intake, please let us know so we can arrange for you to receive IV fluids in the infusion center or even at home if your insurance allows. We talked about the use of dispatch health which is more for urgent care needs only and not for nonurgent needs. It is better to call us so we can arrange the care that you need proactively. 4.  Multivitamin deficiency 
-You were found to have severe vitamin A and vitamin D deficiency. You are on replacement therapy now. Please discuss with your primary care doctor about rechecking the levels and continuing prescription level replacement. 5. We completed an AMD naming your wife as Nikko. 6.  Disease progression 
-We reviewed your scans and plan for chemotherapy in detail. You are tolerating chemotherapy fairly well so far except for the diarrhea. 7.  Anorexia 
-You feel weak. You have desire to eat and eating small frequent meals. Please start drinking Ensure and boost at least 2 bottles per day. 8.  I am glad you are received [employer] COVID-19 vaccine This is what you have shared with us about Advance Care Planning: 
 
  Primary Decision Maker: Mami Nicole Spouse - [phone] Secondary Decision Maker: Francisco Miles - Child - [phone] Supplemental (Other) Decision Maker: Roberto Cristina - Child - [phone] Advance Care Planning 5/11/2021 Patient's Healthcare Decision Maker is: Named in scanned ACP document Primary Decision Maker Name -  
Primary Decision Maker Phone Number -  
Primary Decision Maker Relationship to Patient -  
Confirm Advance Directive Yes, on file Patient Would Like to Complete Advance Directive - Does the patient have other document types - The Palliative Medicine Team is here to support you and your family.   
 
 
Sincerely, 
 
 
Fer Menchaca MD and the Palliative Medicine Team

## 2021-05-11 NOTE — PROGRESS NOTES
Palliative Medicine Outpatient Services  [place]: [phone] ([phone])    Patient Name: Juliette Madrigal. YOB: 1955    Date of Current Visit: 05/11/21  Location of Current Visit:    [] [facility] Office  [] [facility] Office  [] [address] [place] [address]  [] Home  [x] Other: Virtual synchronous A/V visit    Date of Initial Visit: 4/6/2020  Referral from: Dr. Liz Fleming  Primary Care Physician: Dinora Cox DO      SUMMARY:   Juliette Ramirez is a 60y.o. 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 Liz Fleming for management of symptoms and psychosocial support. 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 This is second marriage for both of them. Current treatment-completed RT to the para-aortic mass, pursuing diagnostics with GI physician Dr. Ben Moreau for gastroparesis and pancreatic enzymes, has had biliary stents replaced. Status post celiac plexus block and reversal of Whipple procedure on 9/9/2020    Hospitalization at [facility] for uncontrolled pain in December 2020    L5 biopsy, ablation and kyphoplasty on 1/12/2021    Current treatment-on cisplatin plus gemcitabine plus Abraxane  chemotherapy-induced diarrhea     PALLIATIVE DIAGNOSES:       ICD-10-CM ICD-9-CM    1. Cholangiocarcinoma of biliary tract (HCC)  C22.1 155.1    2. Intractable low back pain  M54.5 724.2    3. Chemotherapy induced diarrhea  K52.1 787.91     T45.1X5A E933.1    4.  Anorexia  R63.0 783.0           PLAN:   Patient Instructions Dear Mary Ellen Da Silva. ,    It was a pleasure seeing you today in your home along with your wife virtually    We will see you again in 4 weeks    If labs or imaging tests have been ordered for you today, please call the office  at [phone] 48 hours after completion to obtain the results. Your stated goal:   - better pain management    Your described symptoms were: Fatigue: 7 Drowsiness: 7   Depression: 0 Pain: 5   Anxiety: 0 Nausea: 0   Anorexia: 0 Dyspnea: 5   Best Well-Bein Constipation: No   Other Problem (Comment): 0       This is the plan we talked about:    1. Chronic low back pain from new L5 met status post ablation and kyphoplasty  -Your back pain is much better now, you are now only on OxyContin 40 mg 2 times a day, you are no longer taking Dilaudid. You were taking Dilaudid 8 mg every 3 hours as needed. You have several Dilaudid tablets left at home and you will take between 2 and 4 mg every 3 hours as needed should you need anything for breakthrough pain. 2.  Chemo induced diarrhea  -You are recently started on Creon but this has not made a big difference and you continue to have loose watery stools about 10 times per day. You are very dehydrated because of the same.    - You are on Immodium and Lomotil as needed  -We started you on opium tincture 1 mL every 8 hours and this has been very helpful. You now only have 4 bouts of diarrhea per day and this is improved your quality of life significantly. Once in a while you take Imodium along with opium tincture. 3.  Sjogren's disease  -You have a tendency to get dehydrated very quickly. We talked about this in detail today including how to be proactive and drink more than a liter of water per day, have Gatorade and lemonade at home.   Once you start chemotherapy, if you are not able to keep up with your oral fluid intake, please let us know so we can arrange for you to receive IV fluids in the infusion center or even at home if your insurance allows. We talked about the use of dispatch health which is more for urgent care needs only and not for nonurgent needs. It is better to call us so we can arrange the care that you need proactively. 4.  Multivitamin deficiency  -You were found to have severe vitamin A and vitamin D deficiency. You are on replacement therapy now. Please discuss with your primary care doctor about rechecking the levels and continuing prescription level replacement. 5. We completed an AMD naming your wife as Nikko. 6.  Disease progression  -We reviewed your scans and plan for chemotherapy in detail. You are tolerating chemotherapy fairly well so far except for the diarrhea. 7.  Anorexia  -You feel weak. You have desire to eat and eating small frequent meals. Please start drinking Ensure and boost at least 2 bottles per day. 8.  I am glad you are received [employer] COVID-19 vaccine     This is what you have shared with us about Advance Care Planning:      Primary Decision Maker: Zachary November - [phone]    Secondary Decision Maker: Ridge Cristina III - Child - [phone]    Supplemental (Other) Decision Maker: Maik Thompson Child - [phone]  Advance Care Planning 5/11/2021   Patient's Healthcare Decision Maker is: Named in scanned ACP document   Primary Decision Maker Name -   Primary Decision Maker Phone Number -   Primary Decision Maker Relationship to Patient -   Confirm Advance Directive Yes, on file   Patient Would Like to Complete Advance Directive -   Does the patient have other document types -           The Palliative Medicine Team is here to support you and your family.        Sincerely,      Mejia Calhoun MD and the Palliative Medicine Team       GOALS OF CARE / TREATMENT PREFERENCES:   [====Goals of Care====]  GOALS OF CARE:  Patient / health care proxy stated goals: See Patient Instructions / Summary    TREATMENT PREFERENCES:   Code Status:  [x] Attempt Resuscitation       [] Do Not Attempt Resuscitation    Advance Care Planning:  [x] The Pall Med Interdisciplinary Team has updated the ACP Navigator with Decision Maker and Patient Capacity      Primary Decision MakerFerdialyssa Fitting - [phone]    Secondary Decision Maker: Ridge Cristina III - Child - [phone]    Supplemental (Other) Decision Maker: Kelle Simmons Child - [phone]  Advance Care Planning 5/11/2021   Patient's Healthcare Decision Maker is: Named in scanned ACP document   Primary Decision Maker Name -   Primary Decision Maker Phone Number -   Primary Decision Maker Relationship to Patient -   Confirm Advance Directive Yes, on file   Patient Would Like to Complete Advance Directive -   Does the patient have other document types -       Other:  (If patient appropriate for POST, consider using PALLPOST smart phrase here)    The palliative care team has discussed with patient / health care proxy about goals of care / treatment preferences for patient.  [====Goals of Care====]     PRESCRIPTIONS GIVEN:     No orders of the defined types were placed in this encounter.           FOLLOW UP:     Future Appointments   Date Time Provider Sloane Roach   5/11/2021 12:30 PM Jared Hardin MD [facility] BS AMB   5/12/2021  9:30 AM [facility]J RM [facility]   5/12/2021 11:30  [facility] 1 [facility] COM   5/12/2021 12:30 PM [facility] RM 1 [facility] REG   5/13/2021  9:00 AM CHAIR 2 [address] REG   5/27/2021  1:00 PM CHAIR 2 [address] REG   5/27/2021  1:15 PM NIKI Syed BS AMB   6/24/2021  1:00 PM [facility] CHAIR 2 [address] REG   7/22/2021  1:00 PM CHAIR 2 [address] REG   8/19/2021  1:00 PM CHAIR 2 [address] REG           PHYSICIANS INVOLVED IN CARE:   Patient Care Team:  Quita Gardiner DO as PCP - General (Internal Medicine)  Quita Gardiner DO as PCP - Hanny Nicholson Provider  Juany Chinchilla MD (General Surgery)  Flakita Lanza MD (Gastroenterology)  Cory Peña MD (Gastroenterology)  Steffany Bagley MD (Hematology and Oncology)  Igor Joaquin MD as Palliative Care (Palliative Medicine)       HISTORY:   Reviewed patient-completed ESAS and advance care planning form. Reviewed patient record in prescription monitoring program.    CHIEF COMPLAINT: No chief complaint on file. 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 He is very happy to report that his diarrhea has improved significantly after starting opium tincture. This is also helping with his low back pain so he has stopped using Dilaudid for breakthrough pain. He continues OxyContin 2 times a day and takes tincture of opium 3-4 times per day which is decreased his number of diarrhea episodes to 4/day from 20/day. He is feeling well overall.  -------    Patient here with his wife. Both are very good historians. Mid upper back pain-much improved since radiation to the periaortic mass. He struggled with pain for several months before it was identified as cancer recurrence. He was started on fentanyl 25 mcg patch which he wants to wean off of. He has made upper and mid zone abdominal pain which comes and goes. He has not noticed any specific pattern to the pain but usually the pain comes on before vomiting or relieves without vomiting. Sometimes, he vomits food that he ate about 12 to 14 hours ago. No constipation. He has 2 liquid bowel movements every day. He is unable to tolerate eggs or honey. He is getting tests done to evaluate his pancreatic enzymes. He has very good support through his wife.     Clinical Pain Assessment (nonverbal scale for nonverbal patients):   [++++ Clinical Pain Assessment++++]  [++++Pain Severity++++]: Pain: 5  [++++Pain Character++++]: cramping  [++++Pain Duration++++]: months  [++++Pain Effect++++]: functional   [++++Pain Factors++++]: none in particular  [++++Pain Frequency++++]: on and off  [++++Pain Location++++]: upper abdomen and mid back  [++++ Clinical Pain Assessment++++]       FUNCTIONAL ASSESSMENT:     Palliative Performance Scale (PPS):  PPS: 70       PSYCHOSOCIAL/SPIRITUAL SCREENING:     Any spiritual / Jehovah's witness concerns:  [] Yes /  [x] No    Caregiver Burnout:  [] Yes /  [x] No /  [] No Caregiver Present      Anticipatory grief assessment:   [x] Normal  / [] Maladaptive       ESAS Anxiety: Anxiety: 0    ESAS Depression: Depression: 0       REVIEW OF SYSTEMS:     The following systems were [x] reviewed / [] unable to be reviewed  Systems: constitutional, ears/nose/mouth/throat, respiratory, gastrointestinal, genitourinary, musculoskeletal, integumentary, neurologic, psychiatric, endocrine. Positive findings noted below. Modified ESAS Completed by: provider   Fatigue: 7 Drowsiness: 7   Depression: 0 Pain: 5   Anxiety: 0 Nausea: 0   Anorexia: 0 Dyspnea: 5   Best Well-Bein Constipation: No   Other Problem (Comment): 0          PHYSICAL EXAM:     Wt Readings from Last 3 Encounters:   21 156 lb 14.4 oz (71.2 kg)   21 156 lb (70.8 kg)   21 154 lb 3.2 oz (69.9 kg)     There were no vitals taken for this visit.   Last bowel movement: See Nursing Note    Constitutional    [x] Appears well-developed and well-nourished in no apparent distress    [] Abnormal:  Mental status  [x] Alert and awake  [x] Oriented to person/place/time  [x] Able to follow commands  [] Abnormal:   Eyes  [x] EOM normal   [x] Sclera normal   [x] No visible ocular discharge  [] Abnormal:   HENT  [x] Normocephalic, atraumatic  [x] Mouth/Throat: Moist mucous membranes   [x] External Ears normal  [] Abnormal:  Neck  [x] No visualized mass  [] Abnormal:  Pulmonary/Chest   [x] Respiratory effort normal  [x] No visualized signs of difficulty breathing or respiratory distress  [] Abnormal:  Musculoskeletal  [x] Normal gait with no signs of ataxia  [x] Normal range of motion of neck  [] Abnormal:  Neurological:   [x] No facial asymmetry (Cranial nerve 7 motor function)  [x] No gaze palsy  [] Abnormal:   Skin  [x] No significant exanthematous lesions or discoloration noted on facial skin  [] Abnormal:                                  Psychiatric  [x] Normal affect  [x] No hallucinations  [] Abnormal:    Other pertinent observable physical exam findings:    Due to this being a TeleHealth evaluation, many elements of the physical examination are unable to be assessed. HISTORY:     Past Medical History:   Diagnosis Date    Aneurysm ([facility] [address] 2008    CEREBRAL    Arrhythmia     Previous a.fib, ablation, NSR now; NO LONGER FOLLOWED BY CARDIOLOGIST.     Autoimmune disease ([facility] [address]     SJOGREN'S    Cancer ([facility] [address] 2020    COMMON BILE DUCT, ADENOCARCINOMA, SPINE    Diabetes ([facility] [address]     NIDDM    Family history of skin cancer     Hypertension     Sepsis ([facility] [address] 2017    STENTING TO BILE DUCT    Status post chemotherapy     Stroke [facility]) 2008    brain aneurysm - no deficits    Sun-damaged skin     Sunburn, blistering       Past Surgical History:   Procedure Laterality Date    HX CHOLECYSTECTOMY      HX GI      COLONOSCOPY, POLYPS (BENIGN)    HX GI  10/06/2017    [facility]     HX GI  2020    WHIPPLE REVISION    HX HEART CATHETERIZATION  2005    Ablation     HX HERNIA REPAIR  12/2020    HERNIA REPAIR    HX ORTHOPAEDIC  2000    Spinal fusion W/ HARDWARE    HX OTHER SURGICAL  11/07/2017    [place] [facility], Dr. Daphne Hunter  01/11/2021    RIGHT IJ PORT-A-CATH PLACEMENT     HX VASCULAR ACCESS  2017    PORTACATH - then removed    IR KYPHOPLASTY LUMBAR  1/12/2021    NEUROLOGICAL PROCEDURE UNLISTED  2005    [place] hole washout from cerebral hemorrhage    DC ABDOMEN SURGERY PROC UNLISTED  10/2017    APRIL      Family History   Problem Relation Age of Onset    Cancer Father         Breast and Colon, MELANOMA    Hypertension Father     Cancer Mother         LEUKEMIA    No Known Problems Sister     Atrial Fibrillation Brother     No Known Problems Sister     No Known Problems Son     No Known Problems Son     Anesth Problems Neg Hx       History reviewed, no pertinent family history. Social History     Tobacco Use    Smoking status: Never Smoker    Smokeless tobacco: Never Used   Substance Use Topics    Alcohol use: Never     Frequency: Never     Comment: very rare     Allergies   Allergen Reactions    Shellfish Derived Anaphylaxis     Soft shell crabs    Morphine Nausea and Vomiting    Pcn [Penicillins] Other (comments)     Pt stated \"always been told PCN\"  Pt tolerated other cephalosporins in the past      Current Outpatient Medications   Medication Sig    opium tincture 10 mg/mL (morphine) liquid Take 1 mL by mouth every six (6) hours as needed for Diarrhea for up to 30 days. Max Daily Amount: 4 mL.  diphenoxylate-atropine (LomotiL) 2.5-0.025 mg per tablet Take 1 Tab by mouth three (3) times daily as needed for Diarrhea. Max Daily Amount: 3 Tabs.  OLANZapine (ZyPREXA) 10 mg tablet Take 1 Tab by mouth See Admin Instructions. Take nightly for 4 days starting the evening of chemotherapy.  pantoprazole (PROTONIX) 40 mg tablet TAKE 1 TABLET BY MOUTH EVERY DAY    lidocaine-prilocaine (EMLA) topical cream Apply  to affected area as needed for Pain. 30-45 min prior to treatments    ondansetron (ZOFRAN ODT) 4 mg disintegrating tablet Take 1 Tab by mouth every eight (8) hours as needed for Nausea or Vomiting.  dexAMETHasone (DECADRON) 1 mg tablet Take 2 tablets by mouth twice daily for 14 days    dutasteride (AVODART) 0.5 mg capsule Take 1 Cap by mouth daily.  Creon 36,000-114,000- 180,000 unit cpDR capsule Take 4 Caps by mouth three (3) times daily (with meals).  2-3 caps each meal and 1 cap for snacks (see additional order)    naloxone (NARCAN) 4 mg/actuation nasal spray Use 1 spray intranasally, then discard. Repeat with new spray every 2 min as needed for opioid overdose symptoms, alternating nostrils.  aluminum & magnesium hydroxide-simethicone (Maalox Maximum Strength) 400-400-40 mg/5 mL suspension Take 10 mL by mouth every six (6) hours as needed for Indigestion.  vitamin A (AQUASOL A) 10,000 unit capsule Take 1 Cap by mouth daily.  vitamin E (AQUA GEMS) 400 unit capsule Take 1 Cap by mouth daily.  empagliflozin (Jardiance) 25 mg tablet Take 1 Tab by mouth nightly.  gabapentin (NEURONTIN) 100 mg capsule Take 3 capsules by mouth twice a day.  cyanocobalamin (VITAMIN B12) 1,000 mcg/mL injection INJECT CONTENTS OF ONE VIAL INTRAMUSCULARLY EVERY OTHER WEEK (Patient taking differently: INJECT CONTENTS OF ONE VIAL INTRAMUSCULARLY EVERY OTHER WEEK (FIRST AND 15TH OF EACH MONTH))    tamsulosin (FLOMAX) 0.4 mg capsule TAKE ONE CAPSULE BY MOUTH EVERY EVENING    ramipriL (ALTACE) 5 mg capsule Take 1 Cap by mouth daily. (Patient taking differently: Take 5 mg by mouth nightly.)    lidocaine (LIDODERM) 5 % 1 Patch by TransDERmal route every twenty-four (24) hours. Apply patch to the affected area for 12 hours a day and remove for 12 hours a day. (Patient taking differently: 1 Patch by TransDERmal route daily as needed. Apply patch to the affected area for 12 hours a day and remove for 12 hours a day.)    loperamide (IMODIUM) 2 mg capsule Take 2-4 mg by mouth as needed for Diarrhea. Give 4 mg after first loose stool. Give an additional 2 mg after each loose stool as needed up to a maximum of 8 doses (16 mg/day).  lipase-protease-amylase (Creon) 36,000-114,000- 180,000 unit cpDR capsule Take 1 Cap by mouth as needed (for snacks).  2-3 caps each meal and 1 cap for snacks (see additional order)    finasteride (PROSCAR) 5 mg tablet TAKE 1 TABLET BY MOUTH EVERY DAY    acetaminophen (TYLENOL) 325 mg tablet Take 2 Tabs by mouth every four (4) hours as needed for Pain or Fever (Over the counter medication).  sildenafil citrate (VIAGRA) 50 mg tablet Take 1 Tab by mouth as needed (ED).  alpha-D-galactosidase (BEANO PO) Take 1 Tab by mouth two (2) times a day.  cetirizine (ZYRTEC) 10 mg tablet Take 10 mg by mouth nightly. No current facility-administered medications for this visit. LAB DATA REVIEWED:     Lab Results   Component Value Date/Time    WBC 4.8 05/06/2021 09:22 AM    HGB 9.1 (L) 05/06/2021 09:22 AM    PLATELET 891 (L) 50/12/3577 09:22 AM     Lab Results   Component Value Date/Time    Sodium 139 05/06/2021 09:22 AM    Potassium 3.9 05/06/2021 09:22 AM    Chloride 108 05/06/2021 09:22 AM    CO2 25 05/06/2021 09:22 AM    BUN 20 05/06/2021 09:22 AM    Creatinine 0.71 05/06/2021 09:22 AM    Calcium 7.8 (L) 05/06/2021 09:22 AM    Magnesium 2.0 05/06/2021 09:22 AM    Phosphorus 3.2 04/29/2021 01:08 PM      Lab Results   Component Value Date/Time    Alk. phosphatase 132 (H) 05/06/2021 09:22 AM    Protein, total 5.5 (L) 05/06/2021 09:22 AM    Albumin 3.0 (L) 05/06/2021 09:22 AM    Globulin 2.5 05/06/2021 09:22 AM     Lab Results   Component Value Date/Time    INR 1.1 09/09/2020 02:15 AM    Prothrombin time 11.5 (H) 09/09/2020 02:15 AM    aPTT 29.9 09/09/2020 02:15 AM      Lab Results   Component Value Date/Time    Iron 31 (L) 01/02/2020 03:24 AM    TIBC 245 (L) 01/02/2020 03:24 AM    Iron % saturation 13 (L) 01/02/2020 03:24 AM    Ferritin 122 01/02/2020 03:24 AM      CT abd 3/1/21  IMPRESSION  1. No significant change in the appearance of pulmonary nodules. 2. No significant change in the appearance of retroperitoneal adenopathy/mass. There is mass effect upon the vasculature, including occlusion of the left renal  vein with collateral vessels in the retroperitoneum, likely unchanged. 3. Incidental findings as above. CT chest 12/29/2020  IMPRESSION:     1.  Numerous tiny pulmonary parenchymal nodules right greater than left,  suspicious for metastatic disease. 2. Focal ill-defined opacity in the right upper lobe which may represent  infiltrate. Follow-up recommended, however. 3. Incidental findings in the upper abdomen described earlier same day. CONTROLLED SUBSTANCES SAFETY ASSESSMENT (IF ON CONTROLLED SUBSTANCES):     Reviewed opioid safety handout:  [x] Yes   [] No  24 hour opioid dose >150mg morphine equivalent/day:  [] Yes   [x] No  Benzodiazepines:  [] Yes   [x] No  Sleep apnea:  [] Yes   [x] No  Urine Toxicology Testing within last 6 months:  [] Yes   [x] No  History of or new aberrant medication taking behaviors:  [] Yes   [x] No  Has Narcan been prescribed [x] Yes   [] No          Total time: 45 minutes   Counseling / coordination time: 40  > 50% counseling / coordination?:   Consent:  He and/or health care decision maker is aware that that he may receive a bill for this telehealth service, depending on his insurance coverage, and has provided verbal consent to proceed: Yes    CPT Codes 96212-07679 for Established Patients may apply to this Telehealth Visit  Pursuant to the emergency declaration under the [facility] waiver authority and the [employer] and [employer] Act, this Virtual  Visit was conducted, with patient's consent, to reduce the patient's risk of exposure to COVID-19 and provide continuity of care for an established patient. Services were provided through a video synchronous discussion virtually to substitute for in-person clinic visit.

## 2021-05-11 NOTE — PROGRESS NOTES
Palliative Medicine Office Visit  Palliative Medicine Nurse Check In  [phone]    Patient Name: Ajit Romero. YOB: 1955      Date of Office Visit: 5/11/2021    Patient states: \"  \"    From Check In Sheet (scanned in Media):  Has a medical provider talked with you about cardiopulmonary resuscitation (CPR)? [x] Yes   [] No   [] Unable to obtain    Nurse reminder to complete or update ACP FlowSheet:    Is ACP on the Problem List?    [x] Yes    [] No  IF ACP Document is ON FILE; Nurse to place ACP on Problem List     Is there an ACP Note in Chart Review/Note? [x] Yes    [] No   If NO: ALERT PROVIDER         Primary Decision MakerEbedelmer Doan - [phone]    Secondary Decision Maker: Ridge Cristina III - Child - [phone]    Supplemental (Other) Decision Maker: [place] Ramone Child - [phone]  Advance Care Planning 5/11/2021   Patient's [address] is: Named in scanned ACP document   Primary Decision Maker Name -   Primary Decision Maker Phone Number -   Primary Decision Maker Relationship to Patient -   Confirm Advance Directive Yes, on file   Patient Would Like to Complete Advance Directive -   Does the patient have other document types -       Is there anything that we should know about you as a person in order to provide you the best care possible? Have you been to the ER, urgent care clinic since your last visit? [] Yes   [x] No   [] Unable to obtain    Have you been hospitalized since your last visit? [] Yes   [x] No   [] Unable to obtain    Have you seen or consulted any other health care providers outside of the [address] [place] [address] since your last visit?    [] Yes   [x] No   [] Unable to obtain    Functional status (describe):         Last BM: 5/11/2021     accessed (date): 5/11/2021    Bottle review (for opioid pain medication):  Medication 1:   Date filled:   Directions:   # filled:   # left:   # pills taking per day:  Last dose taken:    Medication 2:   Date filled:   Directions:   # filled:   # left:   # pills taking per day:  Last dose taken:    Medication 3:   Date filled:   Directions:   # filled:   # left:   # pills taking per day:  Last dose taken:    Medication 4:   Date filled:   Directions:   # filled:   # left:   # pills taking per day:  Last dose taken:

## 2021-05-13 NOTE — PROGRESS NOTES
900- Pt arrived to [facility] ambulatory in no acute distress for C6D8 Cisplatin/Gemzar/Abraxane. Assessment unremarkable except numbness/tingling to the bottom of his feet, fatigue, and left sided rib pain from pulling a muscle yesterday. Patient reports diarrhea is better with new meds. R chest port accessed without issue and positive blood return noted. Patient denied having any symptoms of COVID-19, i.e. SOB, coughing, fever, or generally not feeling well. Also denies having been exposed to COVID-19 recently or having had any recent contact with family/friend that has a pending COVID test.    Labs obtained - CBCap, Chem8, Mag  Recent Results (from the past 12 hour(s))   MAGNESIUM    Collection Time: 05/13/21  9:12 AM   Result Value Ref Range    Magnesium 1.8 1.6 - 2.4 mg/dL   CBC WITH 3 PART DIFF    Collection Time: 05/13/21  9:12 AM   Result Value Ref Range    WBC 2.4 (L) 4.1 - 11.1 K/uL    RBC 2.79 (L) 4.10 - 5.70 M/uL    HGB 9.1 (L) 12.1 - 17.0 g/dL    HCT 27.9 (L) 36.6 - 50.3 %    .0 (H) 80.0 - 99.0 FL    MCH 32.6 26.0 - 34.0 PG    MCHC 32.6 30.0 - 36.5 g/dL    RDW 16.8 (H) 11.8 - 15.8 %    PLATELET 995 086 - 699 K/uL    NEUTROPHILS 69 32 - 75 %    MIXED CELLS 17 (H) 3.2 - 16.9 %    LYMPHOCYTES 14 12 - 49 %    ABS. NEUTROPHILS 1.7 (L) 1.8 - 8.0 K/UL    ABS. MIXED CELLS 0.4 0.2 - 1.2 K/uL    ABS.  LYMPHOCYTES 0.3 (L) 0.8 - 3.5 K/UL    DF AUTOMATED     POC CHEM8    Collection Time: 05/13/21  9:16 AM   Result Value Ref Range    Calcium, ionized (POC) 1.19 1.12 - 1.32 mmol/L    Sodium (POC) 134 (L) 136 - 145 mmol/L    Potassium (POC) 4.1 3.5 - 5.1 mmol/L    Chloride (POC) 102 98 - 107 mmol/L    CO2 (POC) 22 21 - 32 mmol/L    Anion gap (POC) 15 10 - 20 mmol/L    Glucose (POC) 138 (H) 65 - 100 mg/dL    BUN (POC) 9 9 - 20 mg/dL    Creatinine (POC) 0.60 0.6 - 1.3 mg/dL    GFRAA, POC >60 >60 ml/min/1.73m2    GFRNA, POC >60 >60 ml/min/1.73m2    Hematocrit (POC) 28 (L) 36.6 - 50.3 %    Comment Notified RN or MD immediately by          The following medications administered:  1 L NS w/ 10 mEq KCl + 2 g Mag IV over 60 mins  NS @ KVO  Aloxi 0.25 mg IVP  Emend 150 mg IVPB over 20 mins  Decadron 12 mg IVPB over 20 mins  Abraxane 181 mg IV over 30 mins  Gemzar 1448 mg IV over 30 mins  Cisplatin 45.3 mg IV over 60 mins    Patient Vitals for the past 12 hrs:   Temp Pulse Resp BP SpO2   05/13/21 1344  64 16 111/69    05/13/21 0903 98.1 °F (36.7 °C) 97 16 109/76 100 %       1350- Pt tolerated treatment well, no adverse reactions noted. Port flushed per policy and de-accessed, 2x2 and tape placed. Pt discharged ambulatory in no acute distress, accompanied by self. Next appointment 5/27/21.

## 2021-05-25 NOTE — PROGRESS NOTES
Adrian Rogers. is a 60 y.o. male here for follow up for cholangiocarcinoma. Treatment today:  Cycle 7 Day 1 Nab-Paclitaxel + Gemcitabine + Cisplatin    1. Have you been to the ER, urgent care clinic since your last visit? Hospitalized since your last visit? no    2. Have you seen or consulted any other health care providers outside of the [address] [place] [address] since your last visit? Include any pap smears or colon screening. no    Pt states he has been very fatigued since Thursday. His legs are very weak and tired.

## 2021-05-27 NOTE — PROGRESS NOTES
[address] [facility] [address], [address] [facility], [address] [place] [address], 200 [facility]  [phone]    Follow-up Note      Patient: Brooke Mo MRN: [id number]  SSN: [id number]    YOB: 1955  Age: 60 y.o. Sex: male        Diagnosis:     1. 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 S/P Pancreatoduodenectomy on  10/06/2017  2. Adjuvant monotherapy with Capecitabine - s/p 6 cycles   (12/4/2017 - 05/2018)  3. SBRT RP node/soft tissue 04/2020  4. Palliative chemotherapy   Cis/[place]/Abraxane - s/p 6 Cycles     Subjective:      Brooke Tate. is a 60 y.o. male with a diagnosis of extrahepatic cholangiocarcinoma with metastatic to the lungs, bones and retroperitoneal node/soft tissue. He presented to the ED in August 2017 with obstructive jaundice. He was found to have an obstructive lesion in the dital bile duct. The CT showed marked intrahepatic biliary dilation and common bile duct dilation to the level of the mid common bile duct, which ws markedly narrowed. He underwent a stenting procedure followed by spyglass cholangiogram. The brushings revealed a diagnosis of cholangiocarcinoma. He underwent a Whipple procedure on 10/06/2017. He completed 6 cycles of adjuvant Xeloda. PET scan showed increased activity in the soft tissue along the SMA. CT guided biopsy showed local recurrence. He underwent SBRT by Dr. Anuradha Cook in April 2020. He was admitted with severe back pain. Repeat CT shows growth of tumor in the L4/L5, lung and RP node/soft tissue. He underwent a CT guided celiac plexus block which has helped the back pain immensely. A biopsy of the L4 vertebrae confirms a diagnosis of metastatic cholangiocarcinoma.      Mr. Avel Angelucci is receiving palliative chemotherapy. He is feeling poorly and report fatigue. He has some numbness in his feet and weakness in legs. Review of Systems:    Constitutional: fatigue  Eyes: negative  Ears, Nose, Mouth, Throat, and Face: negative  Respiratory: negative  Cardiovascular: negative  Gastrointestinal: nausea, diarrhea  Genitourinary:negative  Integument/Breast: negative  Hematologic/Lymphatic: negative  Musculoskeletal: B/L ankle edema  Neurological: numbness/tingling B/L feet      Past Medical History:   Diagnosis Date    Aneurysm ([facility] [address] 2008    CEREBRAL    Arrhythmia     Previous a.fib, ablation, NSR now; NO LONGER FOLLOWED BY CARDIOLOGIST.     Autoimmune disease ([facility] [address]     SJOGREN'S    Cancer ([facility] [address] 2020    COMMON BILE DUCT, ADENOCARCINOMA, SPINE    Diabetes ([facility] [address]     NIDDM    Family history of skin cancer     Hypertension     Sepsis ([facility] [address] 2017    STENTING TO BILE DUCT    Status post chemotherapy     Stroke [facility]) 2008    brain aneurysm - no deficits    Sun-damaged skin     Sunburn, blistering      Past Surgical History:   Procedure Laterality Date    HX CHOLECYSTECTOMY      HX GI      COLONOSCOPY, POLYPS (BENIGN)    HX GI  10/06/2017    April Hassan [facility] [place]     HX GI  2020    WHIPPLE REVISION    HX HEART CATHETERIZATION  2005    Ablation     HX HERNIA REPAIR  12/2020    HERNIA REPAIR    HX ORTHOPAEDIC  2000    Spinal fusion W/ HARDWARE    HX OTHER SURGICAL  11/07/2017    [place] Cath, Dr. Blakely Smoker HX OTHER SURGICAL  01/11/2021    RIGHT IJ PORT-A-CATH PLACEMENT     HX VASCULAR ACCESS  2017    PORTACATH - then removed    IR KYPHOPLASTY LUMBAR  1/12/2021    NEUROLOGICAL PROCEDURE UNLISTED  2005    Ting hole washout from cerebral hemorrhage    [place] ABDOMEN SURGERY PROC UNLISTED  10/2017    APRIL      Family History   Problem Relation Age of Onset    Cancer Father         Breast and Colon, MELANOMA    Hypertension Father     Cancer Mother         LEUKEMIA    No Known Problems Sister     Atrial Fibrillation Brother     No Known Problems Sister     No Known Problems Son     No Known Problems Son     Anesth Problems Neg Hx      Social History     Tobacco Use    Smoking status: Never Smoker    Smokeless tobacco: Never Used   Substance Use Topics    Alcohol use: Never     Comment: very rare      Prior to Admission medications    Medication Sig Start Date End Date Taking? Authorizing Provider   opium tincture 10 mg/mL (morphine) liquid Take 1 mL by mouth every six (6) hours as needed for Diarrhea for up to 30 days. Max Daily Amount: 4 mL. 5/4/21 6/3/21 Yes Negrita Brasher MD   diphenoxylate-atropine (LomotiL) 2.5-0.025 mg per tablet Take 1 Tab by mouth three (3) times daily as needed for Diarrhea. Max Daily Amount: 3 Tabs. 4/27/21  Yes Negrita Brasher MD   OLANZapine (ZyPREXA) 10 mg tablet Take 1 Tab by mouth See Admin Instructions. Take nightly for 4 days starting the evening of chemotherapy. 1/29/21  Yes Geoff Melendez MD   pantoprazole (PROTONIX) 40 mg tablet TAKE 1 TABLET BY MOUTH EVERY DAY 1/11/21  Yes Lorraine Choi MD   lidocaine-prilocaine (EMLA) topical cream Apply  to affected area as needed for Pain. 30-45 min prior to treatments 1/7/21  Yes Geoff Melendez MD   ondansetron (ZOFRAN ODT) 4 mg disintegrating tablet Take 1 Tab by mouth every eight (8) hours as needed for Nausea or Vomiting. 1/7/21  Yes Geoff Melendez MD   dexAMETHasone (DECADRON) 1 mg tablet Take 2 tablets by mouth twice daily for 14 days 12/31/20  Yes Tee Lopes MD   dutasteride (AVODART) 0.5 mg capsule Take 1 Cap by mouth daily. 12/21/20  Yes Dinora Cox DO   Creon 36,000-114,000- 180,000 unit cpDR capsule Take 4 Caps by mouth three (3) times daily (with meals). 2-3 caps each meal and 1 cap for snacks (see additional order) 12/21/20  Yes Silas Raya III, DO   naloxone (NARCAN) 4 mg/actuation nasal spray Use 1 spray intranasally, then discard.  Repeat with new spray every 2 min as needed for opioid overdose symptoms, alternating nostrils. 12/11/20  Yes Pk Chávez MD   aluminum & magnesium hydroxide-simethicone (Maalox Maximum Strength) 400-400-40 mg/5 mL suspension Take 10 mL by mouth every six (6) hours as needed for Indigestion. Yes Provider, Historical   vitamin A (AQUASOL A) 10,000 unit capsule Take 1 Cap by mouth daily. 10/26/20  Yes Silas Raya III, DO   vitamin E (AQUA GEMS) 400 unit capsule Take 1 Cap by mouth daily. 10/26/20  Yes Silas Raya III, DO   empagliflozin (Jardiance) 25 mg tablet Take 1 Tab by mouth nightly. 10/20/20  Yes Silas Raya III, DO   gabapentin (NEURONTIN) 100 mg capsule Take 3 capsules by mouth twice a day. 10/20/20  Yes Silas Raya III, DO   cyanocobalamin (VITAMIN B12) 1,000 mcg/mL injection INJECT CONTENTS OF ONE VIAL INTRAMUSCULARLY EVERY OTHER WEEK  Patient taking differently: INJECT CONTENTS OF ONE VIAL INTRAMUSCULARLY EVERY OTHER WEEK (FIRST AND 15TH OF EACH MONTH) 10/20/20  Yes Silas Raya III, DO   tamsulosin (FLOMAX) 0.4 mg capsule TAKE ONE CAPSULE BY MOUTH EVERY EVENING 10/20/20  Yes Silas Raya III, DO   ramipriL (ALTACE) 5 mg capsule Take 1 Cap by mouth daily. Patient taking differently: Take 5 mg by mouth nightly. 10/20/20  Yes Silas Raya III, DO   lidocaine (LIDODERM) 5 % 1 Patch by TransDERmal route every twenty-four (24) hours. Apply patch to the affected area for 12 hours a day and remove for 12 hours a day. Patient taking differently: 1 Patch by TransDERmal route daily as needed. Apply patch to the affected area for 12 hours a day and remove for 12 hours a day. 10/15/20  Yes Silas Raya III, DO   loperamide (IMODIUM) 2 mg capsule Take 2-4 mg by mouth as needed for Diarrhea. Give 4 mg after first loose stool. Give an additional 2 mg after each loose stool as needed up to a maximum of 8 doses (16 mg/day).    Yes Provider, Historical lipase-protease-amylase (Creon) 36,000-114,000- 180,000 unit cpDR capsule Take 1 Cap by mouth as needed (for snacks). 2-3 caps each meal and 1 cap for snacks (see additional order)   Yes Provider, Historical   finasteride (PROSCAR) 5 mg tablet TAKE 1 TABLET BY MOUTH EVERY DAY 4/13/20  Yes Provider, Historical   acetaminophen (TYLENOL) 325 mg tablet Take 2 Tabs by mouth every four (4) hours as needed for Pain or Fever (Over the counter medication). 1/2/20  Yes Donna Partida NP   sildenafil citrate (VIAGRA) 50 mg tablet Take 1 Tab by mouth as needed (ED). 5/6/19  Yes Silas Raya III, DO   alpha-D-galactosidase (BEANO PO) Take 1 Tab by mouth two (2) times a day. Yes Other, MD Flynn   cetirizine (ZYRTEC) 10 mg tablet Take 10 mg by mouth nightly. Yes Provider, Historical          Allergies   Allergen Reactions    Shellfish Derived Anaphylaxis     Soft shell crabs    Morphine Nausea and Vomiting    Pcn [Penicillins] Other (comments)     Pt stated \"always been told PCN\"  Pt tolerated other cephalosporins in the past           Objective:     Visit Vitals  /66   Pulse 75   Temp 98.9 °F (37.2 °C)   Resp 16   Ht 5' 8\" (1.727 m)   Wt 152 lb (68.9 kg)   SpO2 99%   BMI 23.11 kg/m²     Pain Scale: /10       Physical Exam:     GENERAL: alert, cooperative, no distress, appears stated age  EYE: negative  LYMPHATIC: Cervical, supraclavicular, and axillary nodes normal.   THROAT & NECK: normal and no erythema or exudates noted.    LUNG: clear to auscultation bilaterally  HEART: regular rate and rhythm  ABDOMEN: soft, non-tender  EXTREMITIES: trace ankle edema  SKIN: Scabs on top of head  NEUROLOGIC: negative      Physical exam and ROS has been modified from a prior visit to make it relevant and current      Lab Results   Component Value Date/Time    WBC 4.5 05/27/2021 09:08 AM    Hemoglobin (POC) 10.3 (L) 10/06/2017 01:14 PM    HGB 10.1 (L) 05/27/2021 09:08 AM    Hematocrit (POC) 28 (L) 05/13/2021 09:16 AM HCT 30.9 (L) 05/27/2021 09:08 AM    PLATELET 988 (L) 11/79/8689 09:08 AM    .6 (H) 05/27/2021 09:08 AM       Lab Results   Component Value Date/Time    Sodium 139 05/06/2021 09:22 AM    Potassium 3.9 05/06/2021 09:22 AM    Chloride 108 05/06/2021 09:22 AM    CO2 25 05/06/2021 09:22 AM    Anion gap 6 05/06/2021 09:22 AM    Glucose 144 (H) 05/06/2021 09:22 AM    BUN 20 05/06/2021 09:22 AM    Creatinine 0.71 05/06/2021 09:22 AM    BUN/Creatinine ratio 28 (H) 05/06/2021 09:22 AM    GFR est AA >60 05/06/2021 09:22 AM    GFR est non-AA >60 05/06/2021 09:22 AM    Calcium 7.8 (L) 05/06/2021 09:22 AM    Bilirubin, total 0.4 05/06/2021 09:22 AM    Alk. phosphatase 132 (H) 05/06/2021 09:22 AM    Protein, total 5.5 (L) 05/06/2021 09:22 AM    Albumin 3.0 (L) 05/06/2021 09:22 AM    Globulin 2.5 05/06/2021 09:22 AM    A-G Ratio 1.2 05/06/2021 09:22 AM    ALT (SGPT) 22 05/06/2021 09:22 AM    AST (SGOT) 14 (L) 05/06/2021 09:22 AM       CT Results (most recent):  Results from Hospital Encounter encounter on 05/12/21    CT ABD PELV W CONT    Narrative  EXAMINATION:  CT CHEST W CONT, CT ABD PELV W CONT  INDICATION:  restaging  COMPARISON: 3/1/2021. CONTRAST: 100 mL of Isovue-370. TECHNIQUE:  Following the uneventful intravenous administration of 100 cc  Isovue-370,  axial images were obtained through the chest, abdomen, and pelvis. Oral contrast was  administered. Coronal and sagittal reformatted images were  generated. CT dose reduction was achieved through use of a standardized protocol  tailored for this examination and automatic exposure control for dose  modulation. CT CHEST:  THYROID: Unremarkable. MEDIASTINUM/ANGEL: No mass or lymphadenopathy. HEART/PERICARDIUM: Tip of the infusion catheter just superior to atrial caval  junction. Mild cardiomegaly. Coronary artery calcification. .  VESSELS: No aneurysm or dissection. LUNGS: No change in tiny pulmonary nodules. No new nodules or masses.  No acute  airspace disease. Wava Prim PLEURA: No effusion. CHEST WALL: No significant abnormality. CT ABDOMEN AND PELVIS:  LIVER: Pneumobilia again noted. No focal mass. Wedge-shaped area of increased  density in the superior right lobe, consistent with a perfusion abnormality. GALLBLADDER: Surgically absent. Unchanged pneumobilia. No significant biliary  dilatation. SPLEEN: Unchanged small hypodense lesion. PANCREAS: Status post Whipple procedure. Unremarkable appearance of the  remainder of the pancreas. ADRENALS: Unremarkable. KIDNEYS: No significant change in the left perinephric stranding and of the soft  tissue density extending into the right renal hilus. Mild left hydronephrosis. 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 Normal appearance of the right kidney. STOMACH: Unremarkable. SMALL BOWEL: No dilatation or wall thickening. COLON: No dilatation or wall thickening. Fecal retention. Diverticulosis. APPENDIX: Nondistended. PERITONEUM: No ascites or pneumoperitoneum. RETROPERITONEUM: No significant change in para-aortic soft tissue mass at the  level of the renal arteries with the long axis of approximately 5.3 cm. No other  retroperitoneal adenopathy or mass. REPRODUCTIVE ORGANS: Unremarkable. URINARY BLADDER: Unremarkable. PELVIC LYMPH NODES: None enlarged. BONES: Postsurgical changes at L5-S1. Prior L5 kyphoplasty. No destructive  osseous lesions. .  ADDITIONAL COMMENTS:  N/A    Impression  1. Unchanged appearance of the chest, abdomen, and pelvis when compared to  3/1/2021. No acute findings or evidence of disease progression. 2. Unchanged small pulmonary nodules. Unchanged retroperitoneal soft tissue mass  with left renal vein occlusion and collateral retroperitoneal venous structures. I personally reviewed the images. Stable disease. Assessment:     1.  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 Local recurrence in the para-aortic soft tissue - S/P SBRT     Recent CT shows progression of disease in the retroperitoneum, L4, lungs  The disease is unresectable. Treatment will in a palliative intent with a goal to extend life. Receiving palliative chemotherapy   Cis/[place]/Abraxane - s/p 6 Cycles     Fatigue and weakness is getting worse  A detailed system by system evaluation of side effect was performed to assess chemotherapy related toxicity. Blood counts are acceptable. Results reviewed with the patient. CT: Stable disease      2. Cancer related pain    S/P celiac plexus block - done by Dr. Charity Roper with significant improvement in the pain  Following with Palliative medicine. Taking Oxycodone twice a day and dilaudid 1 every 6 hours      3. Bone metastasis, L4    Nuclear medicine bone scan  Ablation/Kyphoplasty - Dr. Charity Roper  On Denosumab      4. Nausea, CINV - improved    Aloxi, emend, dexamethasone in OPIC  Olanzapine 10 mg po nightly x 4 starting day of chemotherapy       5. Diarrhea from malabsorption - improved    Using Lomotil and opium tincture      6. Chemotherapy related neuropathy    Stable  Grade I       Plan:     > HOLD chemotherapy for at least 1 month  > Labs in 4 weeks  > Continue to follow with palliative medicine  > Continue Olanzapine  > Continue Lomotil and opium tincture  > Continue xgeva  > Follow-up in 4 weeks        II performed a history and physical examination of the patient and discussed his management with the NPP.  I reviewed the NPP note and agree with the documented findings and plan of care. The patient was seen in conjunction with Ms. Camacho. Mr. Ridge Rubin is a gentleman with metastatic cholangiocarcinoma. He is receiving palliative chemotherapy. Imaging shows stable disease. He is feeling weaker over the last few weeks of treatment. His physical examination is normal.       Signed by: Deb Camacho MD                     May 27, 2021      CC. Romi Moon MD  CC. Tomi Starkey MD  CC. Nuria Tristan MD  CC. Franklin Kincaid MD  CC.  Karine Cardenas MD

## 2021-05-27 NOTE — LETTER
5/27/2021 Patient: Caren Roberts. YOB: 1955 Date of Visit: 5/27/2021 Professor Carine Lseter DO 
[address] [address] [address] [employer] Dear Professor Carine Lester DO, Thank you for referring Mr. Diana Alexander to [address] [place] [address] for evaluation. My notes for this consultation are attached. If you have questions, please do not hesitate to call me. I look forward to following your patient along with you.  
 
 
Sincerely, 
 
Hever Go NP

## 2021-05-27 NOTE — PROGRESS NOTES
905- Pt arrived to [facility] ambulatory in no acute distress for C7D1 Cisplatin/Gemzar/Abraxane + Xgeva. Assessment unremarkable except numbness/tingling to feet and very fatigued. R chest port accessed without issue and positive blood return noted. Patient denies any recent or upcoming dental work. Patient to MD office for appt. Visit Vitals  /66   Pulse 75   Temp 98.9 °F (37.2 °C)   Resp 16   Ht 5' 8\" (1.727 m)   Wt 69 kg (152 lb 1.6 oz)   SpO2 99%   BMI 23.13 kg/m²       Patient denied having any symptoms of COVID-19, i.e. SOB, coughing, fever, or generally not feeling well. Also denies having been exposed to COVID-19 recently or having had any recent contact with family/friend that has a pending COVID test.      Labs obtained - CBCap, Hepatic Function Panel, Chem8, Mag, Ca 19-9  Recent Results (from the past 12 hour(s))   CBC WITH 3 PART DIFF    Collection Time: 05/27/21  9:08 AM   Result Value Ref Range    WBC 4.5 4.1 - 11.1 K/uL    RBC 3.04 (L) 4.10 - 5.70 M/uL    HGB 10.1 (L) 12.1 - 17.0 g/dL    HCT 30.9 (L) 36.6 - 50.3 %    .6 (H) 80.0 - 99.0 FL    MCH 33.2 26.0 - 34.0 PG    MCHC 32.7 30.0 - 36.5 g/dL    RDW 17.5 (H) 11.8 - 15.8 %    PLATELET 322 (L) 769 - 400 K/uL    NEUTROPHILS 75 32 - 75 %    MIXED CELLS 16 3.2 - 16.9 %    LYMPHOCYTES 9 (L) 12 - 49 %    ABS. NEUTROPHILS 3.4 1.8 - 8.0 K/UL    ABS. MIXED CELLS 0.7 0.2 - 1.2 K/uL    ABS. LYMPHOCYTES 0.4 (L) 0.8 - 3.5 K/UL    DF AUTOMATED     POC CHEM8    Collection Time: 05/27/21  9:55 AM   Result Value Ref Range    Calcium, ionized (POC) 1.12 1.12 - 1.32 mmol/L    Sodium (POC) 135 (L) 136 - 145 mmol/L    Potassium (POC) 4.0 3.5 - 5.1 mmol/L    Chloride (POC) 100 98 - 107 mmol/L    CO2 (POC) 24.9 21 - 32 mmol/L    Anion gap (POC) 11 10 - 20 mmol/L    Glucose (POC) 108 (H) 65 - 100 mg/dL    Creatinine (POC) 0.67 0.6 - 1.3 mg/dL    GFRAA, POC >60 >60 ml/min/1.73m2    GFRNA, POC >60 >60 ml/min/1.73m2    Comment Comment Not Indicated. HEPATIC FUNCTION PANEL    Collection Time: 05/27/21  9:57 AM   Result Value Ref Range    Protein, total 5.6 (L) 6.4 - 8.2 g/dL    Albumin 3.1 (L) 3.5 - 5.0 g/dL    Globulin 2.5 2.0 - 4.0 g/dL    A-G Ratio 1.2 1.1 - 2.2      Bilirubin, total 0.9 0.2 - 1.0 MG/DL    Bilirubin, direct 0.3 (H) 0.0 - 0.2 MG/DL    Alk. phosphatase 210 (H) 45 - 117 U/L    AST (SGOT) 31 15 - 37 U/L    ALT (SGPT) 54 12 - 78 U/L   MAGNESIUM    Collection Time: 05/27/21  9:57 AM   Result Value Ref Range    Magnesium 2.2 1.6 - 2.4 mg/dL       Office called to say that patient will be taking a break from chemo for at least 1 month and receive Xgeva ONLY today. The following medications administered:  Xgeva 120 mg SQ to left arm    1005- Pt tolerated treatment well, no adverse reactions noted. Port flushed per policy and de-accessed, 2x2 and tape placed. Pt discharged ambulatory in no acute distress, accompanied by self. Next appointment 6/24/21.

## 2021-06-01 NOTE — TELEPHONE ENCOUNTER
----- Message from [place]. Brett Rosa. sent at 6/1/2021  9:18 AM EDT -----  Regarding: Prescription Question  Contact: [phone]  Ton [place] Area,  I need a refill for the  opium tincture. It is really helping decrease the frequency of stools. I use [address] [place] [address] Pharmacy.   Thank you,  Sara Wahl

## 2021-06-01 NOTE — TELEPHONE ENCOUNTER
Triage for Controlled Substance Refill Request    Pain Diagnosis: _Functional diarrhea (K59.1)    Last Outpatient Visit: _5/11/2021    Next Outpatient Visit: _6/10/2021    Reason for refill needed outside of office visit? -Appointment not scheduled prior to need for scheduled refill      Pharmacy: _[facility] [address]    Medication:opium tincture 10 mg/mL (morphine) liquid  Dose and directions: Take 1 mL by mouth every six (6) hours as needed for Diarrhea.   Number dispensed:118ml   Date filled ( or Pharmacy):  # left:         reviewed: _yes     Date of Urine Drug Screen:  _n/a     Opioid Safety Handout given:  _yes     Appropriate for refill:  _yes     Action:  _ please refill

## 2021-06-10 NOTE — PROGRESS NOTES
Palliative Medicine Outpatient Services  Jesus: [phone] ([phone])    Patient Name: Reny Garrett YOB: 1955    Date of Current Visit: 06/10/21  Location of Current Visit:    [] [facility] Office  [] [facility] Office  [] [address] [place] [address]  [] Home  [x] Other: Virtual synchronous A/V visit    Date of Initial Visit: 4/6/2020  Referral from: Dr. Nav Laurent  Primary Care Physician: Gorge Briceño DO      SUMMARY:   Reny Garrett is a 60y.o. 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 Nav Laurent for management of symptoms and psychosocial support. 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 This is second marriage for both of them. Current treatment-completed RT to the para-aortic mass, pursuing diagnostics with GI physician Dr. Tiff Jefferson for gastroparesis and pancreatic enzymes, has had biliary stents replaced. Status post celiac plexus block and reversal of Whipple procedure on 9/9/2020    Hospitalization at [facility] for uncontrolled pain in December 2020    L5 biopsy, ablation and kyphoplasty on 1/12/2021    Current treatment-on cisplatin plus gemcitabine plus Abraxane, chemotherapy currently on hold  chemotherapy versus functional diarrhea     PALLIATIVE DIAGNOSES:       ICD-10-CM ICD-9-CM    1. Functional diarrhea  K59.1 564.5    2. Cholangiocarcinoma of biliary tract (HCC)  C22.1 155.1    3. Anorexia  R63.0 783.0    4.  Pain of upper abdomen  R10.10 789.09           PLAN:   Patient Instructions     Dear Caren Roberts. ,    It was a pleasure seeing you today in your home along with your wife virtually    We will see you again in 2 months    If labs or imaging tests have been ordered for you today, please call the office  at [phone] 48 hours after completion to obtain the results. Your stated goal:   - better pain management    Your described symptoms were: Fatigue: 5 Drowsiness: 3   Depression: 0 Pain: 2   Anxiety: 0 Nausea: 0   Anorexia: 0 Dyspnea: 3   Best Well-Being: 3 Constipation: No   Other Problem (Comment): 0       This is the plan we talked about:    1. Chronic low back pain from new L5 met status post ablation and kyphoplasty  -Your back pain is much better now, you are only on OxyContin 20 mg daily. You would like to wean off of this as well. You can stop taking OxyContin and take Dilaudid 2 mg daily for a week and then 2 mg every other day for a week and then stop. Please call us should you develop any flulike symptoms while you are weaning off of opioids. 2.  Chemo induced diarrhea  -You are recently started on Creon but this has not made a big difference  -You are taking opium tincture every 6 hours and this is been very helpful, this is brought your diarrhea episodes from 12-15 a day to 5-6 a day. You take Lomotil and Imodium along with this. 3.  Sjogren's disease  -You have a tendency to get dehydrated very quickly. We talked about this in detail today including how to be proactive and drink more than a liter of water per day, have Gatorade and lemonade at home. Once you start chemotherapy, if you are not able to keep up with your oral fluid intake, please let us know so we can arrange for you to receive IV fluids in the infusion center or even at home if your insurance allows. We talked about the use of dispatch health which is more for urgent care needs only and not for nonurgent needs.   It is better to call us so we can arrange the care that you need proactively. 4.  Multivitamin deficiency  -You were found to have severe vitamin A and vitamin D deficiency. You are on replacement therapy now. Please discuss with your primary care doctor about rechecking the levels and continuing prescription level replacement. 5. We completed an AMD naming your wife as Nikko. 6.  Disease discussion  -We reviewed your scans and plan to hold chemotherapy for a month. You are very glad with this. 7.  Anorexia  -You feel weak. You have desire to eat and eating small frequent meals. Please start drinking Ensure and boost at least 2 bottles per day. 8.  I am glad you are received Morna Lo COVID-19 vaccine     This is what you have shared with us about Advance Care Planning:      Primary Decision Maker: Kalyan Katz - [phone]    Secondary Decision Maker: Ridge Cristina III - Child - [phone]    Supplemental (Other) Decision Maker: Fransisca Steiner Child - [phone]  Advance Care Planning 6/10/2021   Patient's [address] is: Named in scanned ACP document   Primary Decision Maker Name -   Primary Decision Maker Phone Number -   Primary Decision Maker Relationship to Patient -   Confirm Advance Directive Yes, on file   Patient Would Like to Complete Advance Directive -   Does the patient have other document types -           The Palliative Medicine Team is here to support you and your family.        Sincerely,      Tramaine Sparrow MD and the Palliative Medicine Team       GOALS OF CARE / TREATMENT PREFERENCES:   [====Goals of Care====]  GOALS OF CARE:  Patient / health care proxy stated goals: See Patient Instructions / Summary    TREATMENT PREFERENCES:   Code Status:  [x] Attempt Resuscitation       [] Do Not Attempt Resuscitation    Advance Care Planning:  [x] [facility] Interdisciplinary Team has updated the ACP Navigator with Decision Maker and Patient Capacity      Primary Decision Maker: Christelle Cristina - Spouse - [phone]    Secondary Decision Maker: Ridge Cristina III - Child - [phone]    Supplemental (Other) Decision Maker: Jonas Nation Child - [phone]  Advance Care Planning 6/10/2021   Patient's Healthcare Decision Maker is: Named in scanned ACP document   Primary Decision Maker Name -   Primary Decision Maker Phone Number -   Primary Decision Maker Relationship to Patient -   Confirm Advance Directive Yes, on file   Patient Would Like to Complete Advance Directive -   Does the patient have other document types -       Other:  (If patient appropriate for POST, consider using PALLPOST smart phrase here)    The palliative care team has discussed with patient / health care proxy about goals of care / treatment preferences for patient.  [====Goals of Care====]     PRESCRIPTIONS GIVEN:     No orders of the defined types were placed in this encounter. FOLLOW UP:     Future Appointments   Date Time Provider Sloane Roach   6/24/2021  9:00 AM [place] [facility]USION NURSE 1 [facility] REG   6/24/2021  9[facility]15 [facility]   7/1/2021  9:00 AM CHAIR 2 [facility] REG   7/15/2021  9:00 AM [facility] CHAIR 2 [facility] REG   7/22/2021  9:00 AM [place] INFUSION NURSE 1 [address] REG   8/19/2021  1:00 PM CHAIR 2 [address] REG           PHYSICIANS INVOLVED IN CARE:   Patient Care Team:  Daphne Klein DO as PCP - General (Internal Medicine)  Daphne Klein DO as PCP - [facility]  Elver Staples MD (General Surgery)  Mili Estrada MD (Gastroenterology)  Anabella Cloud MD (Gastroenterology)  Fran Felton MD (Hematology and Oncology)  Sheldon Lux MD as Palliative Care (Palliative Medicine)       HISTORY:   Reviewed patient-completed ESAS and advance care planning form. Reviewed patient record in prescription monitoring program.    CHIEF COMPLAINT: No chief complaint on file.       HPI/SUBJECTIVE: The patient is: [x] Verbal / [] Nonverbal     You are doing very well overall, your pain is much better and you have started weaning yourself off of OxyContin, you are only taking 20 mg daily. We talked about coming off of this as well and agreed that you will stop taking OxyContin and take Dilaudid 2 mg daily for a week and then every other day for a week and then stop. You are on opium tincture regularly to help with the diarrhea which will also help with opioid withdrawal when you come off of oral pills. Diarrhea substantial without opium tincture and you would like to continue this for now. I will provide you with refills. -------    Patient here with his wife. Both are very good historians. Mid upper back pain-much improved since radiation to the periaortic mass. He struggled with pain for several months before it was identified as cancer recurrence. He was started on fentanyl 25 mcg patch which he wants to wean off of. He has made upper and mid zone abdominal pain which comes and goes. He has not noticed any specific pattern to the pain but usually the pain comes on before vomiting or relieves without vomiting. Sometimes, he vomits food that he ate about 12 to 14 hours ago. No constipation. He has 2 liquid bowel movements every day. He is unable to tolerate eggs or honey. He is getting tests done to evaluate his pancreatic enzymes. He has very good support through his wife.     Clinical Pain Assessment (nonverbal scale for nonverbal patients):   [++++ Clinical Pain Assessment++++]  [++++Pain Severity++++]: Pain: 2  [++++Pain Character++++]: cramping  [++++Pain Duration++++]: months  [++++Pain Effect++++]: functional   [++++Pain Factors++++]: none in particular  [++++Pain Frequency++++]: on and off  [++++Pain Location++++]: upper abdomen and mid back  [++++ Clinical Pain Assessment++++]       FUNCTIONAL ASSESSMENT:     Palliative Performance Scale (PPS):  PPS: 70 PSYCHOSOCIAL/SPIRITUAL SCREENING:     Any spiritual / Alevism concerns:  [] Yes /  [x] No    Caregiver Burnout:  [] Yes /  [x] No /  [] No Caregiver Present      Anticipatory grief assessment:   [x] Normal  / [] Maladaptive       ESAS Anxiety: Anxiety: 0    ESAS Depression: Depression: 0       REVIEW OF SYSTEMS:     The following systems were [x] reviewed / [] unable to be reviewed  Systems: constitutional, ears/nose/mouth/throat, respiratory, gastrointestinal, genitourinary, musculoskeletal, integumentary, neurologic, psychiatric, endocrine. Positive findings noted below. Modified ESAS Completed by: provider   Fatigue: 5 Drowsiness: 3   Depression: 0 Pain: 2   Anxiety: 0 Nausea: 0   Anorexia: 0 Dyspnea: 3   Best Well-Being: 3 Constipation: No   Other Problem (Comment): 0          PHYSICAL EXAM:     Wt Readings from Last 3 Encounters:   05/27/21 152 lb 1.6 oz (69 kg)   05/27/21 152 lb (68.9 kg)   05/13/21 152 lb 4.8 oz (69.1 kg)     There were no vitals taken for this visit.   Last bowel movement: See Nursing Note    Constitutional    [x] Appears well-developed and well-nourished in no apparent distress    [] Abnormal:  Mental status  [x] Alert and awake  [x] Oriented to person/place/time  [x] Able to follow commands  [] Abnormal:   Eyes  [x] EOM normal   [x] Sclera normal   [x] No visible ocular discharge  [] Abnormal:   HENT  [x] Normocephalic, atraumatic  [x] Mouth/Throat: Moist mucous membranes   [x] External Ears normal  [] Abnormal:  Neck  [x] No visualized mass  [] Abnormal:  Pulmonary/Chest   [x] Respiratory effort normal  [x] No visualized signs of difficulty breathing or respiratory distress  [] Abnormal:  Musculoskeletal  [x] Normal gait with no signs of ataxia  [x] Normal range of motion of neck  [] Abnormal:  Neurological:   [x] No facial asymmetry (Cranial nerve 7 motor function)  [x] No gaze palsy  [] Abnormal:   Skin  [x] No significant exanthematous lesions or discoloration noted on facial skin  [] Abnormal:                                  Psychiatric  [x] Normal affect  [x] No hallucinations  [] Abnormal:    Other pertinent observable physical exam findings:    Due to this being a TeleHealth evaluation, many elements of the physical examination are unable to be assessed. HISTORY:     Past Medical History:   Diagnosis Date    Aneurysm ([facility] 2008    CEREBRAL    Arrhythmia     Previous a.fib, ablation, NSR now; NO LONGER FOLLOWED BY CARDIOLOGIST.     Autoimmune disease ([facility]     SJOGREN'S    Cancer ([facility] 2020    COMMON BILE DUCT, ADENOCARCINOMA, SPINE    Diabetes ([facility]     NIDDM    Family history of skin cancer     Hypertension     Sepsis ([facility] 2017    STENTING TO BILE DUCT    Status post chemotherapy     Stroke [facility]) 2008    brain aneurysm - no deficits    Sun-damaged skin     Sunburn, blistering       Past Surgical History:   Procedure Laterality Date    HX CHOLECYSTECTOMY      HX GI      COLONOSCOPY, POLYPS (BENIGN)    HX GI  10/06/2017    [facility]     HX GI  2020    WHIPPLE REVISION    HX HEART CATHETERIZATION  2005    Ablation     HX HERNIA REPAIR  12/2020    HERNIA REPAIR    HX ORTHOPAEDIC  2000    Spinal fusion W/ HARDWARE    HX OTHER SURGICAL  11/07/2017    [place] Cath, Dr. Ruddy Palafox HX OTHER SURGICAL  01/11/2021    RIGHT IJ PORT-A-CATH PLACEMENT     HX VASCULAR ACCESS  2017    PORTACATH - then removed    IR KYPHOPLASTY LUMBAR  1/12/2021    NEUROLOGICAL PROCEDURE UNLISTED  2005    Ting hole washout from cerebral hemorrhage    NM ABDOMEN SURGERY PROC UNLISTED  10/2017    APRIL      Family History   Problem Relation Age of Onset    Cancer Father         Breast and Colon, MELANOMA    Hypertension Father     Cancer Mother         LEUKEMIA    No Known Problems Sister     Atrial Fibrillation Brother     No Known Problems Sister     No Known Problems Son     No Known Problems Son     Anesth Problems Neg Hx       History reviewed, no pertinent family history. Social History     Tobacco Use    Smoking status: Never Smoker    Smokeless tobacco: Never Used   Substance Use Topics    Alcohol use: Never     Comment: very rare     Allergies   Allergen Reactions    Shellfish Derived Anaphylaxis     Soft shell crabs    Morphine Nausea and Vomiting    Pcn [Penicillins] Other (comments)     Pt stated \"always been told PCN\"  Pt tolerated other cephalosporins in the past      Current Outpatient Medications   Medication Sig    opium tincture 10 mg/mL (morphine) liquid Take 1 mL by mouth every six (6) hours as needed for Diarrhea for up to 30 days. Max Daily Amount: 4 mL.  diphenoxylate-atropine (LomotiL) 2.5-0.025 mg per tablet Take 1 Tab by mouth three (3) times daily as needed for Diarrhea. Max Daily Amount: 3 Tabs.  OLANZapine (ZyPREXA) 10 mg tablet Take 1 Tab by mouth See Admin Instructions. Take nightly for 4 days starting the evening of chemotherapy.  pantoprazole (PROTONIX) 40 mg tablet TAKE 1 TABLET BY MOUTH EVERY DAY    lidocaine-prilocaine (EMLA) topical cream Apply  to affected area as needed for Pain. 30-45 min prior to treatments    ondansetron (ZOFRAN ODT) 4 mg disintegrating tablet Take 1 Tab by mouth every eight (8) hours as needed for Nausea or Vomiting.  dexAMETHasone (DECADRON) 1 mg tablet Take 2 tablets by mouth twice daily for 14 days    dutasteride (AVODART) 0.5 mg capsule Take 1 Cap by mouth daily.  Creon 36,000-114,000- 180,000 unit cpDR capsule Take 4 Caps by mouth three (3) times daily (with meals). 2-3 caps each meal and 1 cap for snacks (see additional order)    naloxone (NARCAN) 4 mg/actuation nasal spray Use 1 spray intranasally, then discard. Repeat with new spray every 2 min as needed for opioid overdose symptoms, alternating nostrils.     aluminum & magnesium hydroxide-simethicone (Maalox Maximum Strength) 400-400-40 mg/5 mL suspension Take 10 mL by mouth every six (6) hours as needed for Indigestion.  vitamin A (AQUASOL A) 10,000 unit capsule Take 1 Cap by mouth daily.  vitamin E (AQUA GEMS) 400 unit capsule Take 1 Cap by mouth daily.  empagliflozin (Jardiance) 25 mg tablet Take 1 Tab by mouth nightly.  gabapentin (NEURONTIN) 100 mg capsule Take 3 capsules by mouth twice a day.  cyanocobalamin (VITAMIN B12) 1,000 mcg/mL injection INJECT CONTENTS OF ONE VIAL INTRAMUSCULARLY EVERY OTHER WEEK (Patient taking differently: INJECT CONTENTS OF ONE VIAL INTRAMUSCULARLY EVERY OTHER WEEK (FIRST AND 15TH OF EACH MONTH))    tamsulosin (FLOMAX) 0.4 mg capsule TAKE ONE CAPSULE BY MOUTH EVERY EVENING    ramipriL (ALTACE) 5 mg capsule Take 1 Cap by mouth daily. (Patient taking differently: Take 5 mg by mouth nightly.)    lidocaine (LIDODERM) 5 % 1 Patch by TransDERmal route every twenty-four (24) hours. Apply patch to the affected area for 12 hours a day and remove for 12 hours a day. (Patient taking differently: 1 Patch by TransDERmal route daily as needed. Apply patch to the affected area for 12 hours a day and remove for 12 hours a day.)    loperamide (IMODIUM) 2 mg capsule Take 2-4 mg by mouth as needed for Diarrhea. Give 4 mg after first loose stool. Give an additional 2 mg after each loose stool as needed up to a maximum of 8 doses (16 mg/day).  lipase-protease-amylase (Creon) 36,000-114,000- 180,000 unit cpDR capsule Take 1 Cap by mouth as needed (for snacks). 2-3 caps each meal and 1 cap for snacks (see additional order)    finasteride (PROSCAR) 5 mg tablet TAKE 1 TABLET BY MOUTH EVERY DAY    acetaminophen (TYLENOL) 325 mg tablet Take 2 Tabs by mouth every four (4) hours as needed for Pain or Fever (Over the counter medication).  sildenafil citrate (VIAGRA) 50 mg tablet Take 1 Tab by mouth as needed (ED).  alpha-D-galactosidase (BEANO PO) Take 1 Tab by mouth two (2) times a day.  cetirizine (ZYRTEC) 10 mg tablet Take 10 mg by mouth nightly.      No current facility-administered medications for this visit. LAB DATA REVIEWED:     Lab Results   Component Value Date/Time    WBC 4.5 05/27/2021 09:08 AM    HGB 10.1 (L) 05/27/2021 09:08 AM    PLATELET 588 (L) 77/81/3915 09:08 AM     Lab Results   Component Value Date/Time    Sodium 139 05/06/2021 09:22 AM    Potassium 3.9 05/06/2021 09:22 AM    Chloride 108 05/06/2021 09:22 AM    CO2 25 05/06/2021 09:22 AM    BUN 20 05/06/2021 09:22 AM    Creatinine 0.71 05/06/2021 09:22 AM    Calcium 7.8 (L) 05/06/2021 09:22 AM    Magnesium 2.2 05/27/2021 09:57 AM    Phosphorus 3.2 04/29/2021 01:08 PM      Lab Results   Component Value Date/Time    Alk. phosphatase 210 (H) 05/27/2021 09:57 AM    Protein, total 5.6 (L) 05/27/2021 09:57 AM    Albumin 3.1 (L) 05/27/2021 09:57 AM    Globulin 2.5 05/27/2021 09:57 AM     Lab Results   Component Value Date/Time    INR 1.1 09/09/2020 02:15 AM    Prothrombin time 11.5 (H) 09/09/2020 02:15 AM    aPTT 29.9 09/09/2020 02:15 AM      Lab Results   Component Value Date/Time    Iron 31 (L) 01/02/2020 03:24 AM    TIBC 245 (L) 01/02/2020 03:24 AM    Iron % saturation 13 (L) 01/02/2020 03:24 AM    Ferritin 122 01/02/2020 03:24 AM      CT chest, abdomen and pelvis-May 2021  IMPRESSION     1. Unchanged appearance of the chest, abdomen, and pelvis when compared to  3/1/2021. No acute findings or evidence of disease progression. 2. Unchanged small pulmonary nodules. Unchanged retroperitoneal soft tissue mass  with left renal vein occlusion and collateral retroperitoneal venous structures.       CT abd 3/1/21  IMPRESSION  1. No significant change in the appearance of pulmonary nodules. 2. No significant change in the appearance of retroperitoneal adenopathy/mass. There is mass effect upon the vasculature, including occlusion of the left renal  vein with collateral vessels in the retroperitoneum, likely unchanged. 3. Incidental findings as above. CT chest 12/29/2020  IMPRESSION:     1.  Numerous tiny pulmonary parenchymal nodules right greater than left,  suspicious for metastatic disease. 2. Focal ill-defined opacity in the right upper lobe which may represent  infiltrate. Follow-up recommended, however. 3. Incidental findings in the upper abdomen described earlier same day. CONTROLLED SUBSTANCES SAFETY ASSESSMENT (IF ON CONTROLLED SUBSTANCES):     Reviewed opioid safety handout:  [x] Yes   [] No  24 hour opioid dose >150mg morphine equivalent/day:  [] Yes   [x] No  Benzodiazepines:  [] Yes   [x] No  Sleep apnea:  [] Yes   [x] No  Urine Toxicology Testing within last 6 months:  [] Yes   [x] No  History of or new aberrant medication taking behaviors:  [] Yes   [x] No  Has Narcan been prescribed [x] Yes   [] No          Total time: 45 minutes   Counseling / coordination time: 40  > 50% counseling / coordination?:   Consent:  He and/or health care decision maker is aware that that he may receive a bill for this telehealth service, depending on his insurance coverage, and has provided verbal consent to proceed: Yes    CPT Codes 08636-32973 for Established Patients may apply to this Telehealth Visit  Pursuant to the emergency declaration under the [facility], [facility] waiver authority and the [employer] Act, this Virtual  Visit was conducted, with patient's consent, to reduce the patient's risk of exposure to COVID-19 and provide continuity of care for an established patient. Services were provided through a video synchronous discussion virtually to substitute for in-person clinic visit.

## 2021-06-10 NOTE — PROGRESS NOTES
Palliative Medicine Office Visit  Palliative Medicine Nurse Check In  [phone]    Patient Name: Crispin James. YOB: 1955      Date of Office Visit: 6/10/2021    Patient states: \"  \"    From Check In Sheet (scanned in Media):  Has a medical provider talked with you about cardiopulmonary resuscitation (CPR)? [x] Yes   [] No   [] Unable to obtain    Nurse reminder to complete or update ACP FlowSheet:    Is ACP on the Problem List?    [] Yes    [] No  IF ACP Document is ON FILE; Nurse to place ACP on Problem List     Is there an ACP Note in Chart Review/Note? [] Yes    [] No   If NO: ALERT PROVIDER       Primary Decision MakerJodekailey [facility] [phone]    Secondary Decision Maker: Ridge Cristina III - Child - [phone]    Supplemental (Other) Decision Maker: Leigh Victor Child - [phone]  Advance Care Planning 6/10/2021   Patient's [address] is: Named in scanned ACP document   Primary Decision Maker Name -   Primary Decision Maker Phone Number -   Primary Decision Maker Relationship to Patient -   Confirm Advance Directive Yes, on file   Patient Would Like to Complete Advance Directive -   Does the patient have other document types -         Is there anything that we should know about you as a person in order to provide you the best care possible? Have you been to the ER, urgent care clinic since your last visit? [] Yes   [x] No   [] Unable to obtain    Have you been hospitalized since your last visit? [] Yes   [x] No   [] Unable to obtain    Have you seen or consulted any other health care providers outside of the [address] [place] [address] since your last visit?    [] Yes   [x] No   [] Unable to obtain    Functional status (describe):         Last BM: 6/10/2021   accessed (date): 6/10/2021    Bottle review (for opioid pain medication):  Medication 1:   Date filled:   Directions:   # filled:   # left:   # pills taking per day:  Last dose taken:     Medication 2:   Date filled:   Directions:   # filled:   # left:   # pills taking per day:  Last dose taken:    Medication 3:   Date filled:   Directions:   # filled:   # left:   # pills taking per day:  Last dose taken:    Medication 4:   Date filled:   Directions:   # filled:   # left:   # pills taking per day:  Last dose taken:

## 2021-06-10 NOTE — PATIENT INSTRUCTIONS
Dear Nevaeh Moya. ,    It was a pleasure seeing you today in your home along with your wife virtually    We will see you again in 2 months    If labs or imaging tests have been ordered for you today, please call the office  at [phone] 48 hours after completion to obtain the results. Your stated goal:   - better pain management    Your described symptoms were: Fatigue: 5 Drowsiness: 3   Depression: 0 Pain: 2   Anxiety: 0 Nausea: 0   Anorexia: 0 Dyspnea: 3   Best Well-Being: 3 Constipation: No   Other Problem (Comment): 0       This is the plan we talked about:    1. Chronic low back pain from new L5 met status post ablation and kyphoplasty  -Your back pain is much better now, you are only on OxyContin 20 mg daily. You would like to wean off of this as well. You can stop taking OxyContin and take Dilaudid 2 mg daily for a week and then 2 mg every other day for a week and then stop. Please call us should you develop any flulike symptoms while you are weaning off of opioids. 2.  Chemo induced diarrhea  -You are recently started on Creon but this has not made a big difference  -You are taking opium tincture every 6 hours and this is been very helpful, this is brought your diarrhea episodes from 12-15 a day to 5-6 a day. You take Lomotil and Imodium along with this. 3.  Sjogren's disease  -You have a tendency to get dehydrated very quickly. We talked about this in detail today including how to be proactive and drink more than a liter of water per day, have Gatorade and lemonade at home. Once you start chemotherapy, if you are not able to keep up with your oral fluid intake, please let us know so we can arrange for you to receive IV fluids in the infusion center or even at home if your insurance allows. We talked about the use of dispatch health which is more for urgent care needs only and not for nonurgent needs.   It is better to call us so we can arrange the care that you need proactively. 4.  Multivitamin deficiency  -You were found to have severe vitamin A and vitamin D deficiency. You are on replacement therapy now. Please discuss with your primary care doctor about rechecking the levels and continuing prescription level replacement. 5. We completed an AMD naming your wife as Nikko. 6.  Disease discussion  -We reviewed your scans and plan to hold chemotherapy for a month. You are very glad with this. 7.  Anorexia  -You feel weak. You have desire to eat and eating small frequent meals. Please start drinking Ensure and boost at least 2 bottles per day. 8.  I am glad you are received [employer] COVID-19 vaccine     This is what you have shared with us about Advance Care Planning:      Primary Decision Maker: Roscoe Burkitt - [phone]    Secondary Decision Maker: Ridge Cristina III - Child - [phone]    Supplemental (Other) Decision Maker: Brad Hurley Child - [phone]  Advance Care Planning 6/10/2021   Patient's [place] is: Named in scanned ACP document   Primary Decision Maker Name -   Primary Decision Maker Phone Number -   Primary Decision Maker Relationship to Patient -   Confirm Advance Directive Yes, on file   Patient Would Like to Complete Advance Directive -   Does the patient have other document types -           The Palliative Medicine Team is here to support you and your family.        Sincerely,      Keysha Morrow MD and the Palliative Medicine Team

## 2021-06-12 NOTE — TELEPHONE ENCOUNTER
Patient's wife \"Hyun\" calling with concerns for patient's fever. Patient has been directed by Dr Nav Laurent to go to ED for any fever of 100.5 or above. Jen Fabian states that patient's current temperature is 100.5 F. Attention Provider: Thank you for allowing me to participate in the care of your patient. The patient was connected to triage in response to symptoms provided. Please do not respond through this encounter as the response is not directed to a shared pool. Reason for Disposition   Caller has already spoken with the PCP and has no further questions.     Protocols used: NO CONTACT OR DUPLICATE CONTACT CALL-ADULT- temporal

## 2021-06-12 NOTE — ED PROVIDER NOTES
EMERGENCY DEPARTMENT HISTORY AND PHYSICAL EXAM      Date: 6/12/2021  Patient Name: Adrian Rogers. History of Presenting Illness     Chief Complaint   Patient presents with    Chills     Ambulatory into the ED with c/o chills and nausea x this morning. Denies CP, abd pain, urinary sx. 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  Nausea       History Provided By: Patient    HPI: Adrian Rogers., 60 y.o. male  presents to the ED with cc of fevers and chills. Patient states he has been having fevers and chills since this morning. Fever at home was 100.5. He has a history of cholangiocarcinoma and is currently on a break from his chemotherapy. He has had decreased appetite. Denies any vomiting. He states he has chronic diarrhea. No chest pain, shortness of breath, or cough. No upper respiratory type symptoms. No dysuria or frequency. No open skin wounds. No redness swelling or pain over his port site. Past History     Past Medical History:  Past Medical History:   Diagnosis Date    Aneurysm ([address] 2008    CEREBRAL    Arrhythmia     Previous a.fib, ablation, NSR now; NO LONGER FOLLOWED BY CARDIOLOGIST.     Autoimmune disease ([address]     SJOGREN'S    Cancer ([address] 2020    COMMON BILE DUCT, ADENOCARCINOMA, SPINE    Diabetes ([address]     NIDDM    Family history of skin cancer     Hypertension     Sepsis ([address] 2017    STENTING TO BILE DUCT    Status post chemotherapy     Stroke [facility] 2008    brain aneurysm - no deficits    Sun-damaged skin     Sunburn, blistering        Past Surgical History:  Past Surgical History:   Procedure Laterality Date    HX CHOLECYSTECTOMY      HX GI      COLONOSCOPY, POLYPS (BENIGN)    HX GI  10/06/2017    [facility]     HX GI  2020    WHIPPLE REVISION    HX HEART CATHETERIZATION  2005    Ablation     HX HERNIA REPAIR  12/2020    HERNIA REPAIR    HX ORTHOPAEDIC  2000    Spinal fusion W/ HARDWARE    HX OTHER SURGICAL  11/07/2017    [facility]     HX OTHER SURGICAL  01/11/2021    RIGHT IJ PORT-A-CATH PLACEMENT     HX VASCULAR ACCESS  2017    PORTACATH - then removed    IR KYPHOPLASTY LUMBAR  1/12/2021    NEUROLOGICAL PROCEDURE UNLISTED  2005    [place] hole washout from cerebral hemorrhage    FL ABDOMEN SURGERY PROC UNLISTED  10/2017    APRIL       Medications:  No current facility-administered medications on file prior to encounter. Current Outpatient Medications on File Prior to Encounter   Medication Sig Dispense Refill    [START ON 6/29/2021] opium tincture 10 mg/mL (morphine) liquid Take 1 mL by mouth every six (6) hours as needed for Diarrhea for up to 30 days. Max Daily Amount: 4 mL. 118 mL 0    diphenoxylate-atropine (LomotiL) 2.5-0.025 mg per tablet Take 1 Tab by mouth three (3) times daily as needed for Diarrhea. Max Daily Amount: 3 Tabs. 90 Tab 5    OLANZapine (ZyPREXA) 10 mg tablet Take 1 Tab by mouth See Admin Instructions. Take nightly for 4 days starting the evening of chemotherapy. 20 Tab 1    pantoprazole (PROTONIX) 40 mg tablet TAKE 1 TABLET BY MOUTH EVERY DAY 30 Tab 0    lidocaine-prilocaine (EMLA) topical cream Apply  to affected area as needed for Pain. 30-45 min prior to treatments 30 g 3    ondansetron (ZOFRAN ODT) 4 mg disintegrating tablet Take 1 Tab by mouth every eight (8) hours as needed for Nausea or Vomiting. 40 Tab 2    dexAMETHasone (DECADRON) 1 mg tablet Take 2 tablets by mouth twice daily for 14 days 56 Tab 0    dutasteride (AVODART) 0.5 mg capsule Take 1 Cap by mouth daily. 90 Cap 3    Creon 36,000-114,000- 180,000 unit cpDR capsule Take 4 Caps by mouth three (3) times daily (with meals). 2-3 caps each meal and 1 cap for snacks (see additional order) 1080 Cap 3    naloxone (NARCAN) 4 mg/actuation nasal spray Use 1 spray intranasally, then discard. Repeat with new spray every 2 min as needed for opioid overdose symptoms, alternating nostrils.  1 Each 0    aluminum & magnesium hydroxide-simethicone (Maalox Maximum Strength) 400-400-40 mg/5 mL suspension Take 10 mL by mouth every six (6) hours as needed for Indigestion.  vitamin A (AQUASOL A) 10,000 unit capsule Take 1 Cap by mouth daily. 90 Cap 3    vitamin E (AQUA GEMS) 400 unit capsule Take 1 Cap by mouth daily. 90 Cap 3    empagliflozin (Jardiance) 25 mg tablet Take 1 Tab by mouth nightly. 90 Tab 3    gabapentin (NEURONTIN) 100 mg capsule Take 3 capsules by mouth twice a day. 540 Cap 3    cyanocobalamin (VITAMIN B12) 1,000 mcg/mL injection INJECT CONTENTS OF ONE VIAL INTRAMUSCULARLY EVERY OTHER WEEK (Patient taking differently: INJECT CONTENTS OF ONE VIAL INTRAMUSCULARLY EVERY OTHER WEEK (FIRST AND 15TH OF EACH MONTH)) 6 mL 6    tamsulosin (FLOMAX) 0.4 mg capsule TAKE ONE CAPSULE BY MOUTH EVERY EVENING 90 Cap 3    ramipriL (ALTACE) 5 mg capsule Take 1 Cap by mouth daily. (Patient taking differently: Take 5 mg by mouth nightly.) 90 Cap 3    lidocaine (LIDODERM) 5 % 1 Patch by TransDERmal route every twenty-four (24) hours. Apply patch to the affected area for 12 hours a day and remove for 12 hours a day. (Patient taking differently: 1 Patch by TransDERmal route daily as needed. Apply patch to the affected area for 12 hours a day and remove for 12 hours a day.) 30 Each 5    loperamide (IMODIUM) 2 mg capsule Take 2-4 mg by mouth as needed for Diarrhea. Give 4 mg after first loose stool. Give an additional 2 mg after each loose stool as needed up to a maximum of 8 doses (16 mg/day).  lipase-protease-amylase (Creon) 36,000-114,000- 180,000 unit cpDR capsule Take 1 Cap by mouth as needed (for snacks). 2-3 caps each meal and 1 cap for snacks (see additional order)      finasteride (PROSCAR) 5 mg tablet TAKE 1 TABLET BY MOUTH EVERY DAY      acetaminophen (TYLENOL) 325 mg tablet Take 2 Tabs by mouth every four (4) hours as needed for Pain or Fever (Over the counter medication).  1 Tab 0    sildenafil citrate (VIAGRA) 50 mg tablet Take 1 Tab by mouth as needed (ED). 30 Tab 1    alpha-D-galactosidase (BEANO PO) Take 1 Tab by mouth two (2) times a day.  cetirizine (ZYRTEC) 10 mg tablet Take 10 mg by mouth nightly. Family History:  Family History   Problem Relation Age of Onset    Cancer Father         Breast and Colon, MELANOMA    Hypertension Father     Cancer Mother         LEUKEMIA    No Known Problems Sister     Atrial Fibrillation Brother     No Known Problems Sister     No Known Problems Son     No Known Problems Son     Anesth Problems Neg Hx        Social History:  Social History     Tobacco Use    Smoking status: Never Smoker    Smokeless tobacco: Never Used   Substance Use Topics    Alcohol use: Never     Comment: very rare    Drug use: No       Allergies: Allergies   Allergen Reactions    Shellfish Derived Anaphylaxis     Soft shell crabs    Morphine Nausea and Vomiting    Pcn [Penicillins] Other (comments)     Pt stated \"always been told PCN\"  Pt tolerated other cephalosporins in the past       All the above components of the past  history are auto-populated from the electronic record. They have been reviewed and the patient has been interviewed for any pertinent past history that pertains to the patient's chief complaint and reason for visit. Not all pre-populated components may be accurate at the time this note was generated. Review of Systems   Review of Systems   Constitutional: Positive for appetite change, chills, fatigue and fever. HENT: Negative for congestion, ear pain, rhinorrhea, sore throat and trouble swallowing. Eyes: Negative for visual disturbance. Respiratory: Negative for cough, chest tightness and shortness of breath. Cardiovascular: Negative for chest pain and palpitations. Gastrointestinal: Negative for abdominal pain, blood in stool, constipation, diarrhea, nausea and vomiting.    Genitourinary: Negative for decreased urine volume, difficulty urinating, dysuria and frequency. Musculoskeletal: Negative for back pain and neck pain. Skin: Negative for color change and rash. Neurological: Negative for dizziness, weakness, light-headedness and headaches. Physical Exam   Physical Exam  Vitals and nursing note reviewed. Constitutional:       General: He is not in acute distress. Appearance: He is well-developed. He is not ill-appearing. HENT:      Head: Normocephalic. Eyes:      Conjunctiva/sclera: Conjunctivae normal.   Cardiovascular:      Rate and Rhythm: Normal rate and regular rhythm. Pulmonary:      Effort: Pulmonary effort is normal. No accessory muscle usage or respiratory distress. Abdominal:      General: There is no distension. Musculoskeletal:      Cervical back: Normal range of motion. Skin:     General: Skin is warm and dry. Neurological:      Mental Status: He is alert and oriented to person, place, and time. Due to the COVID-19 pandemic, in order to reduce the spread and transmission of the virus, some basic elements of the physical exam have been deferred to reduce direct or close contact with the patient unless they are deemed to be absolutely necessary, regardless of whether the virus is highly suspected or not.       Diagnostic Study Results     Labs -     Recent Results (from the past 24 hour(s))   URINALYSIS W/ REFLEX CULTURE    Collection Time: 06/12/21  2:41 PM    Specimen: Urine   Result Value Ref Range    Color YELLOW/STRAW      Appearance CLEAR CLEAR      Specific gravity 1.018 1.003 - 1.030      pH (UA) 7.0 5.0 - 8.0      Protein Negative NEG mg/dL    Glucose >1,000 (A) NEG mg/dL    Ketone Negative NEG mg/dL    Bilirubin Negative NEG      Blood Negative NEG      Urobilinogen 1.0 0.2 - 1.0 EU/dL    Nitrites Negative NEG      Leukocyte Esterase Negative NEG      WBC 0-4 0 - 4 /hpf    RBC 0-5 0 - 5 /hpf    Epithelial cells FEW FEW /lpf    Bacteria Negative NEG /hpf    UA:UC IF INDICATED CULTURE NOT INDICATED BY UA RESULT CNI      Hyaline cast 0-2 0 - 5 /lpf   CBC WITH AUTOMATED DIFF    Collection Time: 06/12/21  2:41 PM   Result Value Ref Range    WBC 15.3 (H) 4.1 - 11.1 K/uL    RBC 3.31 (L) 4.10 - 5.70 M/uL    HGB 10.6 (L) 12.1 - 17.0 g/dL    HCT 34.1 (L) 36.6 - 50.3 %    .0 (H) 80.0 - 99.0 FL    MCH 32.0 26.0 - 34.0 PG    MCHC 31.1 30.0 - 36.5 g/dL    RDW 14.4 11.5 - 14.5 %    PLATELET 476 (L) 073 - 400 K/uL    MPV 10.7 8.9 - 12.9 FL    NRBC 0.0 0  WBC    ABSOLUTE NRBC 0.00 0.00 - 0.01 K/uL    NEUTROPHILS 89 (H) 32 - 75 %    LYMPHOCYTES 3 (L) 12 - 49 %    MONOCYTES 6 5 - 13 %    EOSINOPHILS 1 0 - 7 %    BASOPHILS 0 0 - 1 %    IMMATURE GRANULOCYTES 1 (H) 0.0 - 0.5 %    ABS. NEUTROPHILS 13.5 (H) 1.8 - 8.0 K/UL    ABS. LYMPHOCYTES 0.5 (L) 0.8 - 3.5 K/UL    ABS. MONOCYTES 0.9 0.0 - 1.0 K/UL    ABS. EOSINOPHILS 0.2 0.0 - 0.4 K/UL    ABS. BASOPHILS 0.0 0.0 - 0.1 K/UL    ABS. IMM. GRANS. 0.2 (H) 0.00 - 0.04 K/UL    DF SMEAR SCANNED      RBC COMMENTS MACROCYTOSIS  PRESENT       METABOLIC PANEL, COMPREHENSIVE    Collection Time: 06/12/21  2:41 PM   Result Value Ref Range    Sodium 136 136 - 145 mmol/L    Potassium 4.0 3.5 - 5.1 mmol/L    Chloride 105 97 - 108 mmol/L    CO2 27 21 - 32 mmol/L    Anion gap 4 (L) 5 - 15 mmol/L    Glucose 98 65 - 100 mg/dL    BUN 14 6 - 20 MG/DL    Creatinine 0.66 (L) 0.70 - 1.30 MG/DL    BUN/Creatinine ratio 21 (H) 12 - 20      GFR est AA >60 >60 ml/min/1.73m2    GFR est non-AA >60 >60 ml/min/1.73m2    Calcium 7.8 (L) 8.5 - 10.1 MG/DL    Bilirubin, total 0.6 0.2 - 1.0 MG/DL    ALT (SGPT) 52 12 - 78 U/L    AST (SGOT) 29 15 - 37 U/L    Alk.  phosphatase 202 (H) 45 - 117 U/L    Protein, total 5.9 (L) 6.4 - 8.2 g/dL    Albumin 3.1 (L) 3.5 - 5.0 g/dL    Globulin 2.8 2.0 - 4.0 g/dL    A-G Ratio 1.1 1.1 - 2.2     BLOOD GAS,LACTIC ACID, POC    Collection Time: 06/12/21  4:12 PM   Result Value Ref Range    pH (POC) 7.39 7.35 - 7.45      pCO2 (POC) 38.2 35.0 - 45.0 MMHG    pO2 (POC) 31 (LL) 80 - 100 MMHG    HCO3 (POC) 23.0 22 - 26 MMOL/L    sO2 (POC) 59.4 (L) 92 - 97 %    Base deficit (POC) 1.8 mmol/L    Specimen type (POC) VENOUS BLOOD      Performed by Sandy Monroe     Lactic Acid (POC) 0.76 0.40 - 2.00 mmol/L       Radiologic Studies -   XR CHEST PORT   Final Result   No acute abnormality              CT Results  (Last 48 hours)    None        CXR Results  (Last 48 hours)               06/12/21 1639  XR CHEST PORT Final result    Impression:  No acute abnormality               Narrative:  EXAM:  XR CHEST PORT       INDICATION:  Eval for Infiltrate       COMPARISON:  2021       FINDINGS: A portable AP radiograph of the chest was obtained at 1621 hours. Port-A-Cath tip overlies SVC. Ulus Reusing The lungs are clear. The cardiac and   mediastinal contours and pulmonary vascularity are normal.  There is slight   scoliosis. Medical Decision Making     I reviewed the vital signs, available nursing notes, past medical history, past surgical history, family history and social history. Vital Signs-I have reviewed the vital signs that have been made available during the patient's emergency department visit. The vital signs auto-populated below are obtained mostly by electronic means through monitoring devices that have been downloaded into the patient's chart by the nursing staff. Some vital signs are not downloaded into the chart until after the patient has been discharged and this note has been completed, therefore some vital signs may not be available to the physician for review prior to patient's discharge or admission. The physician has reviewed the patient's triage vital signs, monitored the electronic monitoring devices remotely for any significant vital sign abnormalities, and have reviewed vital signs prior to discharge.   Some vital signs reviewed at bedside or remotely utilizing electronic monitoring devices may be different than the vital signs downloaded into the electronic medical record. Some vital signs may be erroneous and inaccurate since they are obtained by electronic monitoring devices, and not all vital signs are verified for accuracy by nursing staff prior to downloading into the patient's chart. Patient Vitals for the past 24 hrs:   Temp Pulse Resp BP SpO2   06/12/21 1845    114/63 98 %   06/12/21 1830    112/62 96 %   06/12/21 1815    110/68 96 %   06/12/21 1800    107/72 97 %   06/12/21 1745    (!) 102/56 96 %   06/12/21 1730    110/68 97 %   06/12/21 1715    92/62 97 %   06/12/21 1700    104/78 97 %   06/12/21 1645    111/68 97 %   06/12/21 1630    103/71 97 %   06/12/21 1333 99.1 °F (37.3 °C) 86 14 117/67 99 %         Records Reviewed: Nursing notes for today's visit have been reviewed. I have also reviewed most recent medical records pertinent to today's complaints, if available in our medical record system. I have also reviewed all labs and imaging results from previous results in comparison to results obtained today. If an EKG was obtained today, it has been compared to previous EKGs, if available. If arriving via EMS, the EMS report has been reviewed if made available to us within the patient's time in the emergency department. Provider Notes (Medical Decision Making):   Patient with a history of cholangiocarcinoma who is on chemotherapy presents with fever at home. Other vital signs are stable and within normal ranges. No obvious source just based on clinical evaluation. There is concern for sepsis given his immunocompromise state. Will check CBC with differential to assess for neutropenia. Check chest x-ray for pneumonia. Urinalysis for UTI. Check blood cultures and lactate. Will discuss case with oncology to determine need for IV antibiotics and admission. Even if no obvious source it may be recommended especially if he meets any SIRS criteria. ED Course:   Initial assessment performed.  The patients presenting problems have been discussed, and they are in agreement with the care plan formulated and outlined with them. I have encouraged them to ask questions as they arise throughout their visit. ED Course as of Jun 12 1915   Sat Jun 12, 2021 1911 Spoke with the oncology on-call physician and discussed the patient's results today. They do recommend empiric antibiotics and they can follow-up this week in the office and follow-up on cultures.    [WM]      ED Course User Index  [WM] Karen Proctor MD       Orders Placed This Encounter    CULTURE, BLOOD    CULTURE, BLOOD    XR CHEST PORT    URINALYSIS W/ REFLEX CULTURE    CBC WITH AUTOMATED DIFF    METABOLIC PANEL, COMPREHENSIVE    SARS-COV-2    POC LACTIC ACID (If Available)    POC LACTIC ACID    VITAL SIGNS - PER UNIT ROUTINE    STRICT I & O    NEUROLOGIC STATUS ASSESSMENT - PER UNIT ROUTINE    IP CONSULT TO ONCOLOGY    BLOOD GAS,LACTIC ACID, POC    SALINE LOCK IV ONE TIME STAT    sodium chloride (NS) flush 5-10 mL    acetaminophen (TYLENOL) tablet 1,000 mg    sodium chloride 0.9 % bolus infusion 1,000 mL    levoFLOXacin (Levaquin) 750 mg tablet       Procedures      Critical Care Time:   0    Disposition:  Discharge    The patient's emergency department evaluation is now complete. I have reviewed all labs, imaging, and pertinent information. I have discussed all results with the patient and/or family. Based on our evaluation today I do believe that the patient is safe to be discharged home. The patient has been provided with at home instructions that are pertinent to their complaint today, although these may not be specific to the exact diagnosis. I have reviewed the patient's home medications and attempted to reconcile if not already done so by pharmacy or nursing staff. I have discussed all new prescriptions with the patient.   The patient has been encouraged to follow-up with primary care doctor and/or specialist, and these have been discussed with the patient. The patient has been advised that they may return to the emergency department if they have any worsening symptoms and or new symptoms that are of concern to them. Verbal discharge instructions may have also been provided to the patient that may not be specifically contained in the written discharge instructions. The patient has been given opportunity to ask questions prior to discharge. PLAN:  1. Current Discharge Medication List      START taking these medications    Details   levoFLOXacin (Levaquin) 750 mg tablet Take 1 Tablet by mouth daily. Qty: 7 Tablet, Refills: 0  Start date: 6/12/2021           2. Follow-up Information     Follow up With Specialties Details Why Contact Info    Erica Morelos MD Hematology and Oncology, Internal Medicine, Hematology, Oncology Schedule an appointment as soon as possible for a visit   [id number] [place] [address] [address] [place] [address]  [phone]          Return to ED if worse     Diagnosis     Clinical Impression:   1. Fever in adult    2.  Cholangiocarcinoma ([address]

## 2021-06-12 NOTE — PROGRESS NOTES
Discussed with ER physician. Came in fevers , stable ongoing diarrhea and no other symptoms  Off chemo  Mild leukocytes is  Vitals stable  Port looks fine , cultures drawn    Patient feels ok and will go on po antibiotics.  Will call back with any acute changes but will otherwise follow up with his oncologist

## 2021-06-12 NOTE — DISCHARGE INSTRUCTIONS
It was a pleasure taking care of you in our Emergency Department today. We know that when you come to [facility], you are entrusting us with your health, comfort, and safety. Our physicians and nurses honor that trust, and truly appreciate the opportunity to care for you and your loved ones. We also value your feedback. If you receive a survey about your Emergency Department experience today, please fill it out. We care about our patients' feedback, and we listen to what you have to say. Please read over your discharge instructions as these contain pertinent information to help you in the healing process. These instructions include a list of prescriptions you were given today. Follow-up information is also noted on your discharge papers. There are attached instructions and information pertaining to the reason why you were seen in the emergency department today. These discharge instructions may not be for exactly why you were here, but may be the closest available instructions that we have. These include important advice for things that you can do at home to feel better, and reasons to return to the emergency department. The evaluation and treatment you received in the emergency department is not always definitive care. If follow-up with your primary care doctor or specialist was recommended, it is important that you make these appointments for follow-up care. You may need further testing, procedures, and/or medications to help you feel better. Further tests may be required that are not available in the emergency department. Failure to make these follow-up appointments may jeopardize your health. The emergency department is here for emergent stabilization and evaluation of life and limb threatening illness and/or injuries.   Further care through a specialist or primary care doctor may be required to assist in your healing and complete your treatment and/or evaluation. We may not always be able to make a diagnosis in the emergency department, or things may change that will alter your diagnosis. Our primary goal is to ensure that nothing serious is occurring and that you are stable to continue your treatment and evaluation at home as an outpatient. Of course, if things change, and you feel worse, you are always encouraged to return to the emergency department for re-evaluation. Lab Results Today:  Recent Results (from the past 8 hour(s))   URINALYSIS W/ REFLEX CULTURE    Collection Time: 06/12/21  2:41 PM    Specimen: Urine   Result Value Ref Range    Color YELLOW/STRAW      Appearance CLEAR CLEAR      Specific gravity 1.018 1.003 - 1.030      pH (UA) 7.0 5.0 - 8.0      Protein Negative NEG mg/dL    Glucose >1,000 (A) NEG mg/dL    Ketone Negative NEG mg/dL    Bilirubin Negative NEG      Blood Negative NEG      Urobilinogen 1.0 0.2 - 1.0 EU/dL    Nitrites Negative NEG      Leukocyte Esterase Negative NEG      WBC 0-4 0 - 4 /hpf    RBC 0-5 0 - 5 /hpf    Epithelial cells FEW FEW /lpf    Bacteria Negative NEG /hpf    UA:UC IF INDICATED CULTURE NOT INDICATED BY UA RESULT CNI      Hyaline cast 0-2 0 - 5 /lpf   CBC WITH AUTOMATED DIFF    Collection Time: 06/12/21  2:41 PM   Result Value Ref Range    WBC 15.3 (H) 4.1 - 11.1 K/uL    RBC 3.31 (L) 4.10 - 5.70 M/uL    HGB 10.6 (L) 12.1 - 17.0 g/dL    HCT 34.1 (L) 36.6 - 50.3 %    .0 (H) 80.0 - 99.0 FL    MCH 32.0 26.0 - 34.0 PG    MCHC 31.1 30.0 - 36.5 g/dL    RDW 14.4 11.5 - 14.5 %    PLATELET 362 (L) 737 - 400 K/uL    MPV 10.7 8.9 - 12.9 FL    NRBC 0.0 0  WBC    ABSOLUTE NRBC 0.00 0.00 - 0.01 K/uL    NEUTROPHILS 89 (H) 32 - 75 %    LYMPHOCYTES 3 (L) 12 - 49 %    MONOCYTES 6 5 - 13 %    EOSINOPHILS 1 0 - 7 %    BASOPHILS 0 0 - 1 %    IMMATURE GRANULOCYTES 1 (H) 0.0 - 0.5 %    ABS. NEUTROPHILS 13.5 (H) 1.8 - 8.0 K/UL    ABS. LYMPHOCYTES 0.5 (L) 0.8 - 3.5 K/UL    ABS. MONOCYTES 0.9 0.0 - 1.0 K/UL    ABS. EOSINOPHILS 0.2 0.0 - 0.4 K/UL    ABS. BASOPHILS 0.0 0.0 - 0.1 K/UL    ABS. IMM. GRANS. 0.2 (H) 0.00 - 0.04 K/UL    DF SMEAR SCANNED      RBC COMMENTS MACROCYTOSIS  PRESENT       METABOLIC PANEL, COMPREHENSIVE    Collection Time: 06/12/21  2:41 PM   Result Value Ref Range    Sodium 136 136 - 145 mmol/L    Potassium 4.0 3.5 - 5.1 mmol/L    Chloride 105 97 - 108 mmol/L    CO2 27 21 - 32 mmol/L    Anion gap 4 (L) 5 - 15 mmol/L    Glucose 98 65 - 100 mg/dL    BUN 14 6 - 20 MG/DL    Creatinine 0.66 (L) 0.70 - 1.30 MG/DL    BUN/Creatinine ratio 21 (H) 12 - 20      GFR est AA >60 >60 ml/min/1.73m2    GFR est non-AA >60 >60 ml/min/1.73m2    Calcium 7.8 (L) 8.5 - 10.1 MG/DL    Bilirubin, total 0.6 0.2 - 1.0 MG/DL    ALT (SGPT) 52 12 - 78 U/L    AST (SGOT) 29 15 - 37 U/L    Alk.  phosphatase 202 (H) 45 - 117 U/L    Protein, total 5.9 (L) 6.4 - 8.2 g/dL    Albumin 3.1 (L) 3.5 - 5.0 g/dL    Globulin 2.8 2.0 - 4.0 g/dL    A-G Ratio 1.1 1.1 - 2.2     BLOOD GAS,LACTIC ACID, POC    Collection Time: 06/12/21  4:12 PM   Result Value Ref Range    pH (POC) 7.39 7.35 - 7.45      pCO2 (POC) 38.2 35.0 - 45.0 MMHG    pO2 (POC) 31 (LL) 80 - 100 MMHG    HCO3 (POC) 23.0 22 - 26 MMOL/L    sO2 (POC) 59.4 (L) 92 - 97 %    Base deficit (POC) 1.8 mmol/L    Specimen type (POC) VENOUS BLOOD      Performed by [facility]     Lactic Acid (POC) 0.76 0.40 - 2.00 mmol/L        Radiology Results Today:  XR CHEST PORT    Result Date: 6/12/2021  No acute abnormality

## 2021-06-12 NOTE — ED NOTES
I have reviewed written and verbal discharge instructions with the patient. The patient verbalized understanding. I.V. removed and all questions answered. Pt ambulated out of ER without difficulty.

## 2021-06-14 NOTE — PROGRESS NOTES
[facility] progress note    Called Mr. Cristina  at agreed time. Verified  and address for HIPAA security. Changes since last call include:     Med changes :  No changes. Doctors appointments :  Oncology FU tomorrow. * Periodic nausea with nausea    - Takes Reglan and Zofran. Puts off the nausea 'for a couple of hours' but comes back and he throws up anyway. He has complete relief if he vomits. So would rather \"vthhrow up and get it over with. \"   Then has appetite and can eat about 30 minutes after emesis. - Vomits about 5 times per week. - No change in weight that he knows of.     - Discussed Palliative Care as alternative/  acupuncture- massage. He is interested and will discuss with oncologist tomorrow. * Farm is inactive with cold and winter. Allows him to rest/ hydrate and watch \"[place]\" on TV. * Discussed need to protect himself from Flu/ URI in community.     - Handwashing/ isolation reviewed. Check in for the patients goals:    1) Goal    :  Medication plan. A) Progress -  Nausea/ Vomiting with Xeloda. B) Barriers addressed  - Rx don't work   C) Utilizing strategy  - Discussed Palliative Care option. D) Plan for next check in   - Discuss with Oncology at apt tomorrow. Contact me if needed. Congratulated him on moving forward. Patient verbalized understanding of all information discussed. Pt has my contact information for any further questions, concerns, or needs. 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 Report received from off going nurse. Assumed care of patient. Patient in bed watching tv. NAD noted. Will continue to monitor. Blood counts are acceptable.  Results reviewed with the patient.     Symptom management form reviewed with patient.

## 2021-06-14 NOTE — PROGRESS NOTES
[facility] progress note    Patient eligible for [facility] 994 care management    Two patient identifiers verified. [employer] - spouse    Pt was seen at [facility] 6/12/21    Diagnosis Comment   Fever in adult    Cholangiocarcinoma       Spoke briefly with the following pt. Pt reported he is doing well today, no concerns at this time. Denies temp today. Temp yesterday was 98.6. Challenges to be reviewed by the provider   Additional needs identified to be addressed with provider: yes    labs- culture results pending             Discussed the care management program.  Patient agrees to care management services at this time. Patient's primary care provider relationship reviewed with patient and modified, as applicable. Patient has significant diagnosis of temp and Cholangiocarcinoma of biliary tract . Care management assessment completed:    Patient stated problem: \"I was running a temp, but it's gone now\"    Care manager identified primary concern risk for infection     Emotional Status/Adjustment to Health State: good    Readiness to Change: []  Pre-contemplative    []  Contemplative  [x]  Preparation               []  Action                  []  Maintenance    Barriers/Challenges to Care: []  Decline in memory    []  Language barrier     []  Emotional                  []  Limited mobility  []  Lack of motivation     [] Vision, hearing or cognitive impairment []  Knowledge [] Financial Barriers  [x]  Other     Patient stated goal attend appt's as scheduled    Care Manager/Provider identified medical goal     Goals      Attends follow-up appointments as directed. PCP -  TBD  Oncologist - 6/24/21  Infusion - 6/24/21  Palliative - 8/5/21        Knowledge and adherence of prescribed medication (ie. action, side effects, missed dose, etc.).       Taking prescribed meds including ABX until completed  Culture results pending        Supportive resources in place to maintain patient in the community (ie. Home Health, DME equipment, refer to, medication assistant plan, etc.)      [facility] - # [phone] 24/7  Nurse Access line 24/7  Be Well  [address] [place] Urgent [facility] [phone]  HR Service Now - Henny  [facility] Workday  IT - [phone]  [facility] Help - 1- [phone]  Associate Services for advice and direction, by calling [phone] and pressing 1         Understands red flags post discharge. CP, SOB, cough, wheeze, temp >100, increase in pain not managed with pain med          Support System/Resources: spouse    Advance Care Planning: on file     ADLS/DME: n/a    Referrals: n/a      Upcoming appointments:   Future Appointments   Date Time Provider Sloane Roach   6/24/2021  9:00 AM [place] INFUSION NURSE 1 [facility] REG   6/24/2021  9:15 [facility]   7/1/2021  9:00 AM CHAIR 2 [facility] REG   7/15/2021  9:00 AM [facility] CHAIR 2 [facility]   7/22/2021  9:00 AM [place] INFUSION NURSE 1 [facility]   8/5/2021  9:30 AM Keysha Morrow MD [facility] BS AMB   8/19/2021  1:00 PM CHAIR 2 [place] [address] [facility]:    1. Diagnosis 1- Cholangiocarcinoma of biliary tract     2. Diagnosis 2- fever    4. Key pt activities to achieve better health:   []  Weight loss  []  Improved diabetic control  []  Decreased cholesterol levels  []  Decreased blood pressure  []    []    Patient verbalized understanding of all information discussed. Pt has my contact information for any further questions, concerns, or needs.     Plan for next call:  3 days

## 2021-06-15 NOTE — PROGRESS NOTES
Chart routed to oncologist & PCP making them aware of C/S results. Pt was prescribed Levaquin at time of d/c, which is Resistant. 6/15/2021  8:43 AM - [place]ada, Lab In [facility]    Specimen Information: Blood        Component Value Ref Range & Units Status   Special Requests: NO SPECIAL REQUESTS    Preliminary   Culture result: Abnormal     Preliminary   ESCHERICHIA COLI GROWING IN 1 OF 2 BOTTLES DRAWN (SITE = LAC)    Culture result:    Preliminary   REMAINING BOTTLE(S) HAS/HAVE NO GROWTH SO FAR    Susceptibility     Escherichia coli     ROMINA (Preliminary)    Amikacin ($) <=2 ug/mL S    Ampicillin ($) <=2 ug/mL S    Ampicillin/sulbactam ($) <=2 ug/mL S    Cefazolin ($) <=4 ug/mL S    Cefepime ($$) <=1 ug/mL S    Cefoxitin <=4 ug/mL S    Ceftazidime ($) <=1 ug/mL S    Ceftriaxone ($) <=1 ug/mL S    Ciprofloxacin ($) >=4 ug/mL R    Gentamicin ($) <=1 ug/mL S    Levofloxacin ($) >=8 ug/mL R    Meropenem ($$) <=0.25 ug/mL S    Piperacillin/Tazobac ($) <=4 ug/mL S    Tobramycin ($) <=1 ug/mL S    Trimeth/Sulfa >=320 ug/mL R          Result History    CULTURE, BLOOD on 6/15/2021   CULTURE, BLOOD: Result Notes   Ted Walls   6/14/2021  7:34 AM EDT       D/c on levaquin. C&S pending.       ACM will f/u in 1 day

## 2021-06-16 NOTE — PROGRESS NOTES
Blood culture shows E Coli resistant to levaquin. Spoke with pt re: 1 +blood cx. Pt states his \"Cold chills are gone\" but he is still not feeling 100%. Will pickup keflex today. He may be allergic to [facility] but has tolerated keflex in the past. Strict ED return precautions given.

## 2021-06-17 NOTE — PROGRESS NOTES
CCM Outreach     Call placed to pt, Verified  and address for HIPAA security. Goals      Attends follow-up appointments as directed. PCP -  TBD  Oncologist - 21  Infusion - 21  Palliative - 21  PCP -  TBD  Oncologist - 21  Infusion - 21  Palliative - 21       Knowledge and adherence of prescribed medication (ie. action, side effects, missed dose, etc.). Taking prescribed meds including ABX until completed  Culture results pending     6/15/21  Levofloxacin ($) >=8 ug/mL R     Chart forward to oncology & PCP updating on results. 21 confirmed ABX switched to Keflex, currently taking until gone.  Supportive resources in place to maintain patient in the community (ie. Home Health, DME equipment, refer to, medication assistant plan, etc.)      [facility] - # [phone]   Nurse Access line   Be Well  [address] [place] [address] Urgent [facility] [phone]  HR Service Now - Henny  [facility] Workday  IT - [phone]  MyChart Help - 1- [phone]  Associate Services for advice and direction, by calling [phone] and pressing 1         Understands red flags post discharge. CP, SOB, cough, wheeze, temp >100, increase in pain not managed with pain med    21 pt denies red flags. Pt denies temp or s/s of infection. Pt reported having diarrhea approx 4-5 stools a day, but that is down from 10-12. Pt reported this has been a chronic issue, provider aware.            CM will f/u in 2 weeks

## 2021-06-22 NOTE — PROGRESS NOTES
Karin Kitchen. is a 60 y.o. male here for follow up for cholangiocarcinoma. Treatment today:  Cycle 7 Day 1 NAB-Paclitaxel + Gemcitabine + Cisplatin  He is receiving Xgeva today. 1. Have you been to the ER, urgent care clinic since your last visit? Hospitalized since your last visit? 6/12/21 for chills, nausea. Put on antibiotic. 2. Have you seen or consulted any other health care providers outside of the [address] [place] [address] since your last visit? Include any pap smears or colon screening. no    Pt states he was put on antibiotics when he was in hospital. He has had no more chills or fever. States he is feeling much better since he had time off from treatment.

## 2021-06-24 NOTE — PROGRESS NOTES
Outpatient Infusion Center - Chemotherapy Progress Note    9785 - Pt admit to [facility] for Cisplatin/Gemzar/Abraxane in stable condition (chemo held; only Xgeva given). Assessment completed; new complaints include tingling in the fingertips and some intermittent swelling in the ankles. Patient states that his leg weakness and diarrhea is improving (has roughly 5 diarrhea episodes/day still). Patient just completed his prescription of antibiotics yesterday (abx's prescribed from recent ED admission for fever/chills). Patient denied having any symptoms of COVID-19, i.e. SOB, coughing, fever, or generally not feeling well. Also denies having been exposed to COVID-19 recently or having had any recent contact with family/friend that has a pending COVID test.     R chest port accessed with positive blood return. Labs drawn per order and sent. Line flushed, clamped, Curos Cap applied to end clave. Chemotherapy Flowsheet 6/24/2021   Cycle C7D1   Date -   Drug / Regimen -   Pre Hydration -   Pre Meds -   Notes Xgeva ONLY        Patient Vitals for the past 12 hrs:   Temp Pulse Resp BP   06/24/21 0852 98 °F (36.7 °C) 75 16 102/68        Recent Results (from the past 12 hour(s))   CBC WITH AUTOMATED DIFF    Collection Time: 06/24/21  9:00 AM   Result Value Ref Range    WBC 6.5 4.1 - 11.1 K/uL    RBC 3.49 (L) 4.10 - 5.70 M/uL    HGB 11.1 (L) 12.1 - 17.0 g/dL    HCT 35.5 (L) 36.6 - 50.3 %    .7 (H) 80.0 - 99.0 FL    MCH 31.8 26.0 - 34.0 PG    MCHC 31.3 30.0 - 36.5 g/dL    RDW 14.0 11.5 - 14.5 %    PLATELET 383 (L) 103 - 400 K/uL    MPV 10.2 8.9 - 12.9 FL    NRBC 0.0 0  WBC    ABSOLUTE NRBC 0.00 0.00 - 0.01 K/uL    NEUTROPHILS 69 32 - 75 %    LYMPHOCYTES 8 (L) 12 - 49 %    MONOCYTES 11 5 - 13 %    EOSINOPHILS 11 (H) 0 - 7 %    BASOPHILS 1 0 - 1 %    IMMATURE GRANULOCYTES 0 0.0 - 0.5 %    ABS. NEUTROPHILS 4.5 1.8 - 8.0 K/UL    ABS. LYMPHOCYTES 0.5 (L) 0.8 - 3.5 K/UL    ABS. MONOCYTES 0.7 0.0 - 1.0 K/UL    ABS. EOSINOPHILS 0.7 (H) 0.0 - 0.4 K/UL    ABS. BASOPHILS 0.1 0.0 - 0.1 K/UL    ABS. IMM. GRANS. 0.0 0.00 - 0.04 K/UL    DF SMEAR SCANNED      RBC COMMENTS MACROCYTOSIS  1+       METABOLIC PANEL, COMPREHENSIVE    Collection Time: 06/24/21  9:00 AM   Result Value Ref Range    Sodium 136 136 - 145 mmol/L    Potassium 4.1 3.5 - 5.1 mmol/L    Chloride 105 97 - 108 mmol/L    CO2 27 21 - 32 mmol/L    Anion gap 4 (L) 5 - 15 mmol/L    Glucose 117 (H) 65 - 100 mg/dL    BUN 14 6 - 20 MG/DL    Creatinine 0.71 0.70 - 1.30 MG/DL    BUN/Creatinine ratio 20 12 - 20      GFR est AA >60 >60 ml/min/1.73m2    GFR est non-AA >60 >60 ml/min/1.73m2    Calcium 8.5 8.5 - 10.1 MG/DL    Bilirubin, total 0.6 0.2 - 1.0 MG/DL    ALT (SGPT) 54 12 - 78 U/L    AST (SGOT) 29 15 - 37 U/L    Alk. phosphatase 192 (H) 45 - 117 U/L    Protein, total 6.4 6.4 - 8.2 g/dL    Albumin 3.3 (L) 3.5 - 5.0 g/dL    Globulin 3.1 2.0 - 4.0 g/dL    A-G Ratio 1.1 1.1 - 2.2     MAGNESIUM    Collection Time: 06/24/21  9:00 AM   Result Value Ref Range    Magnesium 2.2 1.6 - 2.4 mg/dL   PHOSPHORUS    Collection Time: 06/24/21  9:00 AM   Result Value Ref Range    Phosphorus 3.7 2.6 - 4.7 MG/DL        Medications:  Medications Administered     0.9% sodium chloride injection 10 mL     Admin Date  06/24/2021 Action  Given Dose  10 mL Route  IntraVENous Administered By  ESVIN Bobby          denosumab (XGEVA) injection 120 mg     Admin Date  06/24/2021 Action  Given Dose  120 mg Route  SubCUTAneous Administered By  ESVIN Bobby          heparin (porcine) pf 300-500 Units     Admin Date  06/24/2021 Action  Given Dose  500 Units Route  InterCATHeter Administered By  ESVIN Bobby          sodium chloride (NS) flush 10 mL     Admin Date  06/24/2021 Action  Given Dose  10 mL Route  IntraVENous Administered By  ESVIN Bobby                 Pt tolerated treatment well.  Port maintained positive blood return throughout treatment, flushed with positive blood return at conclusion, and de-accessed. 1020 - D/c home in no distress.  Pt aware of next [facility] appointment scheduled for:    Future Appointments   Date Time Provider Sloane Roach   7/1/2021  9:00 AM CHAIR 2 [address] REG   7/15/2021  9:00 AM [facility] CHAIR 2 [facility] REG   7/22/2021  9:00 AM [place] INFUSION NURSE 1 [address] REG   8/5/2021  9:30 AM America Babinski, MD [address] [place] [address] BS AMB   8/19/2021  1:00 PM CHAIR 2 [address] REG

## 2021-06-24 NOTE — PROGRESS NOTES
[address] [facility] [address] \[facility]\"", [address] [place] [address]  [phone]    Follow-up Note      Patient: Huseyin Spain MRN: [id number]  SSN: [id number]    YOB: 1955  Age: 60 y.o. Sex: male        Diagnosis:     1. 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 S/P Pancreatoduodenectomy on  10/06/2017  2. Adjuvant monotherapy with Capecitabine - s/p 6 cycles   (12/4/2017 - 05/2018)  3. SBRT RP node/soft tissue 04/2020  4. Palliative chemotherapy   Cis/[place]/Abraxane - s/p 6 cycles - on hold    Subjective:      Huseyin Spain is a 60 y.o. male with a diagnosis of extrahepatic cholangiocarcinoma with metastatic to the lungs, bones and retroperitoneal node/soft tissue. He presented to the ED in August 2017 with obstructive jaundice. He was found to have an obstructive lesion in the dital bile duct. The CT showed marked intrahepatic biliary dilation and common bile duct dilation to the level of the mid common bile duct, which ws markedly narrowed. He underwent a stenting procedure followed by spyglass cholangiogram. The brushings revealed a diagnosis of cholangiocarcinoma. He underwent a Whipple procedure on 10/06/2017. He completed 6 cycles of adjuvant Xeloda. PET scan showed increased activity in the soft tissue along the SMA. CT guided biopsy showed local recurrence. He underwent SBRT by Dr. Kandi Babcock in April 2020. He was admitted with severe back pain. Repeat CT shows growth of tumor in the L4/L5, lung and RP node/soft tissue. He underwent a CT guided celiac plexus block which has helped the back pain immensely. A biopsy of the L4 vertebrae confirms a diagnosis of metastatic cholangiocarcinoma.      Mr. Alba Stauffer is receiving palliative chemotherapy. He has been off of treatment since the end of May. He is feeling better today, however he was in the ED on 6/12 with complaint of fever and nausea. Review of Systems:    Constitutional: fatigue  Eyes: negative  Ears, Nose, Mouth, Throat, and Face: negative  Respiratory: negative  Cardiovascular: negative  Gastrointestinal: nausea, diarrhea (controlled with tincture of opium  Genitourinary:negative  Integument/Breast: negative  Hematologic/Lymphatic: negative  Musculoskeletal: B/L ankle edema  Neurological: numbness/tingling B/L feet    ROS was pulled in from a previous visit. No changes were made d/t symptoms remain the same. Past Medical History:   Diagnosis Date    Aneurysm [facility]) 2008    CEREBRAL    Arrhythmia     Previous a.fib, ablation, NSR now; NO LONGER FOLLOWED BY CARDIOLOGIST.     Autoimmune disease ([facility]     SJOGREN'S    Cancer ([facility] 2020    COMMON BILE DUCT, ADENOCARCINOMA, SPINE    Diabetes ([facility]     NIDDM    Family history of skin cancer     Hypertension     Sepsis ([facility] 2017    STENTING TO BILE DUCT    Status post chemotherapy     Stroke [facility]) 2008    brain aneurysm - no deficits    Sun-damaged skin     Sunburn, blistering      Past Surgical History:   Procedure Laterality Date    HX CHOLECYSTECTOMY      HX GI      COLONOSCOPY, POLYPS (BENIGN)    HX GI  10/06/2017    [facility]     HX GI  2020    WHIPPLE REVISION    HX HEART CATHETERIZATION  2005    Ablation     HX HERNIA REPAIR  12/2020    HERNIA REPAIR    HX ORTHOPAEDIC  2000    Spinal fusion W/ HARDWARE    HX OTHER SURGICAL  11/07/2017    [place] Cath, Dr. Corrinne Bread  01/11/2021    RIGHT IJ PORT-A-CATH PLACEMENT     HX VASCULAR ACCESS  2017    PORTACATH - then removed    IR KYPHOPLASTY LUMBAR  1/12/2021    NEUROLOGICAL PROCEDURE UNLISTED  2005    Ting hole washout from cerebral hemorrhage    AL ABDOMEN SURGERY PROC UNLISTED  10/2017    APRIL      Family History   Problem Relation Age of Onset    Cancer Father         Breast and Colon, MELANOMA    Hypertension Father     Cancer Mother         LEUKEMIA    No Known Problems Sister     Atrial Fibrillation Brother     No Known Problems Sister     No Known Problems Son     No Known Problems Son     Anesth Problems Neg Hx      Social History     Tobacco Use    Smoking status: Never Smoker    Smokeless tobacco: Never Used   Substance Use Topics    Alcohol use: Never     Comment: very rare      Prior to Admission medications    Medication Sig Start Date End Date Taking? Authorizing Provider   vitamin E, dl,tocopheryl acet, (vitamin E acetate) 400 unit capsule TAKE 1 CAPSULE BY MOUTH ONE TIME A DAY 6/16/21  Yes Silas Raya III, DO   vitamin A (AQUASOL A) 10,000 unit capsule TAKE 1 CAPSULE BY MOUTH ONE TIME A DAY 6/16/21  Yes Silas Raya III, DO   opium tincture 10 mg/mL (morphine) liquid Take 1 mL by mouth every six (6) hours as needed for Diarrhea for up to 30 days. Max Daily Amount: 4 mL. 6/29/21 7/29/21 Yes Anna Emerson MD   diphenoxylate-atropine (LomotiL) 2.5-0.025 mg per tablet Take 1 Tab by mouth three (3) times daily as needed for Diarrhea. Max Daily Amount: 3 Tabs. 4/27/21  Yes Negrita Brasher MD   OLANZapine (ZyPREXA) 10 mg tablet Take 1 Tab by mouth See Admin Instructions. Take nightly for 4 days starting the evening of chemotherapy. 1/29/21  Yes Timmy Lombardo MD   pantoprazole (PROTONIX) 40 mg tablet TAKE 1 TABLET BY MOUTH EVERY DAY 1/11/21  Yes Anna Emerson MD   lidocaine-prilocaine (EMLA) topical cream Apply  to affected area as needed for Pain. 30-45 min prior to treatments 1/7/21  Yes Timmy Lombardo MD   ondansetron (ZOFRAN ODT) 4 mg disintegrating tablet Take 1 Tab by mouth every eight (8) hours as needed for Nausea or Vomiting.  1/7/21  Yes Timmy Lombardo MD   dexAMETHasone (DECADRON) 1 mg tablet Take 2 tablets by mouth twice daily for 14 days 12/31/20  Yes Dulce Maria Gonzalez MD dutasteride (AVODART) 0.5 mg capsule Take 1 Cap by mouth daily. 12/21/20  Yes Luis Chisholm DO   Creon 36,000-114,000- 180,000 unit cpDR capsule Take 4 Caps by mouth three (3) times daily (with meals). 2-3 caps each meal and 1 cap for snacks (see additional order) 12/21/20  Yes Silas Raya III,    naloxone (NARCAN) 4 mg/actuation nasal spray Use 1 spray intranasally, then discard. Repeat with new spray every 2 min as needed for opioid overdose symptoms, alternating nostrils. 12/11/20  Yes Camila Bowen MD   aluminum & magnesium hydroxide-simethicone (Maalox Maximum Strength) 400-400-40 mg/5 mL suspension Take 10 mL by mouth every six (6) hours as needed for Indigestion. Yes Provider, Historical   vitamin E (AQUA GEMS) 400 unit capsule Take 1 Cap by mouth daily. 10/26/20  Yes Silas Raya III,    empagliflozin (Jardiance) 25 mg tablet Take 1 Tab by mouth nightly. 10/20/20  Yes Silas Raya III,    gabapentin (NEURONTIN) 100 mg capsule Take 3 capsules by mouth twice a day. 10/20/20  Yes Silas Raya III, DO   cyanocobalamin (VITAMIN B12) 1,000 mcg/mL injection INJECT CONTENTS OF ONE VIAL INTRAMUSCULARLY EVERY OTHER WEEK  Patient taking differently: INJECT CONTENTS OF ONE VIAL INTRAMUSCULARLY EVERY OTHER WEEK (FIRST AND 15TH OF EACH MONTH) 10/20/20  Yes Silas Raya III,    tamsulosin (FLOMAX) 0.4 mg capsule TAKE ONE CAPSULE BY MOUTH EVERY EVENING 10/20/20  Yes Silas Raya III,    ramipriL (ALTACE) 5 mg capsule Take 1 Cap by mouth daily. Patient taking differently: Take 5 mg by mouth nightly. 10/20/20  Yes Silas Raya III, DO   lidocaine (LIDODERM) 5 % 1 Patch by TransDERmal route every twenty-four (24) hours. Apply patch to the affected area for 12 hours a day and remove for 12 hours a day. Patient taking differently: 1 Patch by TransDERmal route daily as needed.  Apply patch to the affected area for 12 hours a day and remove for 12 hours a day. 10/15/20  Yes Silas Raya III, DO   loperamide (IMODIUM) 2 mg capsule Take 2-4 mg by mouth as needed for Diarrhea. Give 4 mg after first loose stool. Give an additional 2 mg after each loose stool as needed up to a maximum of 8 doses (16 mg/day). Yes Provider, Historical   lipase-protease-amylase (Creon) 36,000-114,000- 180,000 unit cpDR capsule Take 1 Cap by mouth as needed (for snacks). 2-3 caps each meal and 1 cap for snacks (see additional order)   Yes Provider, Historical   finasteride (PROSCAR) 5 mg tablet TAKE 1 TABLET BY MOUTH EVERY DAY 4/13/20  Yes Provider, Historical   acetaminophen (TYLENOL) 325 mg tablet Take 2 Tabs by mouth every four (4) hours as needed for Pain or Fever (Over the counter medication). 1/2/20  Yes Sole Mealing, NP   sildenafil citrate (VIAGRA) 50 mg tablet Take 1 Tab by mouth as needed (ED). 5/6/19  Yes Silas Raya III, DO   alpha-D-galactosidase (BEANO PO) Take 1 Tab by mouth two (2) times a day. Yes Other, MD Flynn   cetirizine (ZYRTEC) 10 mg tablet Take 10 mg by mouth nightly. Yes Provider, Historical   cephALEXin (Keflex) 500 mg capsule Take 1 Capsule by mouth four (4) times daily for 7 days. 6/16/21 6/23/21  FEMI Nassar          Allergies   Allergen Reactions    Shellfish Derived Anaphylaxis     Soft shell crabs    Morphine Nausea and Vomiting    Pcn [Penicillins] Other (comments)     Pt stated \"always been told PCN\"  Pt tolerated other cephalosporins in the past           Objective:     Visit Vitals  /68   Pulse 75   Temp 98 °F (36.7 °C)   Resp 16   Ht 5' 8\" (1.727 m)   Wt 157 lb (71.2 kg)   BMI 23.87 kg/m²     Pain Scale: 0 - No pain/10        Physical Exam:     GENERAL: alert, cooperative, no distress, appears stated age  EYE: negative  LYMPHATIC: Cervical, supraclavicular, and axillary nodes normal.   THROAT & NECK: normal and no erythema or exudates noted.    LUNG: clear to auscultation bilaterally  HEART: regular rate and rhythm  ABDOMEN: soft, non-tender  EXTREMITIES: trace ankle edema  SKIN: Scabs on top of head  NEUROLOGIC: negative      Physical exam was pulled in from a previous visit. No changes were made d/t physical exam remains the same. Lab Results   Component Value Date/Time    WBC 6.5 06/24/2021 09:00 AM    Hemoglobin (POC) 10.3 (L) 10/06/2017 01:14 PM    HGB 11.1 (L) 06/24/2021 09:00 AM    Hematocrit (POC) 28 (L) 05/13/2021 09:16 AM    HCT 35.5 (L) 06/24/2021 09:00 AM    PLATELET 022 (L) 26/05/0380 09:00 AM    .7 (H) 06/24/2021 09:00 AM       Lab Results   Component Value Date/Time    Sodium 136 06/24/2021 09:00 AM    Potassium 4.1 06/24/2021 09:00 AM    Chloride 105 06/24/2021 09:00 AM    CO2 27 06/24/2021 09:00 AM    Anion gap 4 (L) 06/24/2021 09:00 AM    Glucose 117 (H) 06/24/2021 09:00 AM    BUN 14 06/24/2021 09:00 AM    Creatinine 0.71 06/24/2021 09:00 AM    BUN/Creatinine ratio 20 06/24/2021 09:00 AM    GFR est AA >60 06/24/2021 09:00 AM    GFR est non-AA >60 06/24/2021 09:00 AM    Calcium 8.5 06/24/2021 09:00 AM    Bilirubin, total 0.6 06/24/2021 09:00 AM    Alk. phosphatase 192 (H) 06/24/2021 09:00 AM    Protein, total 6.4 06/24/2021 09:00 AM    Albumin 3.3 (L) 06/24/2021 09:00 AM    Globulin 3.1 06/24/2021 09:00 AM    A-G Ratio 1.1 06/24/2021 09:00 AM    ALT (SGPT) 54 06/24/2021 09:00 AM    AST (SGOT) 29 06/24/2021 09:00 AM       CT Results (most recent):  Results from Hospital Encounter encounter on 05/12/21    CT ABD PELV W CONT    Narrative  EXAMINATION:  CT CHEST W CONT, CT ABD PELV W CONT  INDICATION:  restaging  COMPARISON: 3/1/2021. CONTRAST: 100 mL of Isovue-370. TECHNIQUE:  Following the uneventful intravenous administration of 100 cc  Isovue-370,  axial images were obtained through the chest, abdomen, and pelvis. Oral contrast was  administered. Coronal and sagittal reformatted images were  generated.  CT dose reduction was achieved through use of a standardized protocol  tailored for this examination and automatic exposure control for dose  modulation. CT CHEST:  THYROID: Unremarkable. MEDIASTINUM/ANGEL: No mass or lymphadenopathy. HEART/PERICARDIUM: Tip of the infusion catheter just superior to atrial caval  junction. Mild cardiomegaly. Coronary artery calcification. .  VESSELS: No aneurysm or dissection. LUNGS: No change in tiny pulmonary nodules. No new nodules or masses. No acute  airspace disease. Nicholes Coca PLEURA: No effusion. CHEST WALL: No significant abnormality. CT ABDOMEN AND PELVIS:  LIVER: Pneumobilia again noted. No focal mass. Wedge-shaped area of increased  density in the superior right lobe, consistent with a perfusion abnormality. GALLBLADDER: Surgically absent. Unchanged pneumobilia. No significant biliary  dilatation. SPLEEN: Unchanged small hypodense lesion. PANCREAS: Status post Whipple procedure. Unremarkable appearance of the  remainder of the pancreas. ADRENALS: Unremarkable. KIDNEYS: No significant change in the left perinephric stranding and of the soft  tissue density extending into the right renal hilus. Mild left hydronephrosis. 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 Normal appearance of the right kidney. STOMACH: Unremarkable. SMALL BOWEL: No dilatation or wall thickening. COLON: No dilatation or wall thickening. Fecal retention. Diverticulosis. APPENDIX: Nondistended. PERITONEUM: No ascites or pneumoperitoneum. RETROPERITONEUM: No significant change in para-aortic soft tissue mass at the  level of the renal arteries with the long axis of approximately 5.3 cm. No other  retroperitoneal adenopathy or mass. REPRODUCTIVE ORGANS: Unremarkable. URINARY BLADDER: Unremarkable. PELVIC LYMPH NODES: None enlarged. BONES: Postsurgical changes at L5-S1. Prior L5 kyphoplasty.  No destructive  osseous lesions. .  ADDITIONAL COMMENTS:  N/A    Impression  1. Unchanged appearance of the chest, abdomen, and pelvis when compared to  3/1/2021. No acute findings or evidence of disease progression. 2. Unchanged small pulmonary nodules. Unchanged retroperitoneal soft tissue mass  with left renal vein occlusion and collateral retroperitoneal venous structures. I personally reviewed the images. Stable disease. Assessment:     1. 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 Local recurrence in the para-aortic soft tissue - S/P SBRT     Recent CT shows progression of disease in the retroperitoneum, L4, lungs  The disease is unresectable. Treatment will in a palliative intent with a goal to extend life. Receiving palliative chemotherapy   Cis/[place]/Abraxane - s/p 6 cycles     Feeling better since being on hold for the past 4 weeks  A detailed system by system evaluation of side effect was performed to assess chemotherapy related toxicity. Blood counts are acceptable. Results reviewed with the patient. CT: Stable disease    Patient and wife present during the visit. He is feeling better since his ER visit. We discussed holding his treatment for the next 4 weeks because he is feeling better and has several activities planned over the next month. 2. Cancer related pain    S/P celiac plexus block - done by Dr. Charlotte Delarosa with significant improvement in the pain  Following with Palliative medicine. Taking Oxycodone twice a day and dilaudid 1 every 6 hours      3.  Bone metastasis, L4    Nuclear medicine bone scan  Ablation/Kyphoplasty -  Jeff  On Denosumab      4. Nausea, CINV - improved    Aloxi, emend, dexamethasone in OPIC  Olanzapine 10 mg po nightly x 4 starting day of chemotherapy       5. Diarrhea from malabsorption - improved    Using Lomotil and opium tincture      6. Chemotherapy related neuropathy    Stable  Grade I       Plan:     > Hold treatment this cycle   > Continue to follow with palliative medicine  > Continue Lomotil and opium tincture  > Continue xgeva today  > Follow-up in 4 weeks          Signed by: Ariella Henderson NP                     June 24, 2021      CC. Saroj Murrell MD  CC. Maritza Ocampo MD  CC. Sidney Francois MD  CC. Abelardo Go MD  CC.  Andreia Emerson MD

## 2021-06-28 NOTE — TELEPHONE ENCOUNTER
----- Message from [place]. Marcia Aashishalyssa. sent at 6/28/2021  9:10 AM EDT -----  Regarding: Prescription Question  Contact: [phone]  Ton Layne Erps,  I need a refill for the  opium tincture. It is really helping decrease the frequency of stools. I use [address] [place] [address] Pharmacy.   Thank you,  Bunnie Simmonds

## 2021-07-06 NOTE — TELEPHONE ENCOUNTER
Called the patient and verified ID x 2. The patient stated that he is \"very cold and has no energy\" and that he can go to the ER if needed and that his \"legs are tingly from my hips down\" and that he was having that on chemotherapy but it \"went away but it never cleared up in my feet\" and he feels he is dehydrated because he had diarrhea last night and had 4 additional episodes to the 4 episodes he usually has for a total of 8 loose bowel movements. Asked if the patient is having any other symptoms such as headaches, dizziness, blurry vision, shortness of breath or anything else and the patient denied any other symptoms. Informed the patient that Deven Green NP recommends labs and hydration if possible and that [facility] is able to draw labs with possible 250 mL - 500 mL hydration depending on when he is able to arrive at [facility]. The patient verbalized understanding and stated that he is on his way and denied any questions or concerns.     No other symptoms

## 2021-07-06 NOTE — PROGRESS NOTES
1615- Pt arrived to [facility] ambulatory in no acute distress for Hydration. Assessment unremarkable except patient reports a few episodes of diarrhea starting at 4 am this morning, fatigue, chills, and generalized weakness. Patient denies fever. R chest port accessed without issue and positive blood return noted. Patient denied having any symptoms of COVID-19, i.e. SOB, coughing, fever, or generally not feeling well. Also denies having been exposed to COVID-19 recently or having had any recent contact with family/friend that has a pending COVID test.    Labs obtained - CBC w/ diff, CMP, Mag, Phos  Recent Results (from the past 12 hour(s))   CBC WITH AUTOMATED DIFF    Collection Time: 07/06/21  4:25 PM   Result Value Ref Range    WBC 5.3 4.1 - 11.1 K/uL    RBC 3.82 (L) 4.10 - 5.70 M/uL    HGB 12.0 (L) 12.1 - 17.0 g/dL    HCT 37.5 36.6 - 50.3 %    MCV 98.2 80.0 - 99.0 FL    MCH 31.4 26.0 - 34.0 PG    MCHC 32.0 30.0 - 36.5 g/dL    RDW 13.1 11.5 - 14.5 %    PLATELET 531 (L) 262 - 400 K/uL    MPV 10.1 8.9 - 12.9 FL    NRBC 0.0 0  WBC    ABSOLUTE NRBC 0.00 0.00 - 0.01 K/uL    NEUTROPHILS 68 32 - 75 %    LYMPHOCYTES 19 12 - 49 %    MONOCYTES 8 5 - 13 %    EOSINOPHILS 5 0 - 7 %    BASOPHILS 1 0 - 1 %    IMMATURE GRANULOCYTES 0 0.0 - 0.5 %    ABS. NEUTROPHILS 3.6 1.8 - 8.0 K/UL    ABS. LYMPHOCYTES 1.0 0.8 - 3.5 K/UL    ABS. MONOCYTES 0.4 0.0 - 1.0 K/UL    ABS. EOSINOPHILS 0.3 0.0 - 0.4 K/UL    ABS. BASOPHILS 0.0 0.0 - 0.1 K/UL    ABS. IMM.  GRANS. 0.0 0.00 - 0.04 K/UL    DF AUTOMATED     METABOLIC PANEL, COMPREHENSIVE    Collection Time: 07/06/21  4:25 PM   Result Value Ref Range    Sodium 141 136 - 145 mmol/L    Potassium 4.2 3.5 - 5.1 mmol/L    Chloride 112 (H) 97 - 108 mmol/L    CO2 24 21 - 32 mmol/L    Anion gap 5 5 - 15 mmol/L    Glucose 171 (H) 65 - 100 mg/dL    BUN 19 6 - 20 MG/DL    Creatinine 0.74 0.70 - 1.30 MG/DL    BUN/Creatinine ratio 26 (H) 12 - 20      GFR est AA >60 >60 ml/min/1.73m2    GFR est non-AA >60 >60 ml/min/1.73m2    Calcium 7.7 (L) 8.5 - 10.1 MG/DL    Bilirubin, total 0.4 0.2 - 1.0 MG/DL    ALT (SGPT) 30 12 - 78 U/L    AST (SGOT) 15 15 - 37 U/L    Alk. phosphatase 152 (H) 45 - 117 U/L    Protein, total 6.2 (L) 6.4 - 8.2 g/dL    Albumin 3.3 (L) 3.5 - 5.0 g/dL    Globulin 2.9 2.0 - 4.0 g/dL    A-G Ratio 1.1 1.1 - 2.2     MAGNESIUM    Collection Time: 07/06/21  4:25 PM   Result Value Ref Range    Magnesium 2.5 (H) 1.6 - 2.4 mg/dL   PHOSPHORUS    Collection Time: 07/06/21  4:25 PM   Result Value Ref Range    Phosphorus 2.2 (L) 2.6 - 4.7 MG/DL       The following medications administered:  1 L NS IV over 60 mins    Patient Vitals for the past 12 hrs:   Temp Pulse Resp BP   07/06/21 1724  76 16 105/64   07/06/21 1618 98.2 °F (36.8 °C) 79 18 102/65       1730- Pt tolerated treatment well. Port flushed per policy and de-accessed, 2x2 and tape placed. Pt discharged ambulatory in no acute distress, accompanied by self. Next appointment 7/22/21.

## 2021-07-14 NOTE — PROGRESS NOTES
[facility] Outreach    Goals      Attends follow-up appointments as directed. PCP -  TBD  Oncologist - 6/24/21  Infusion - 6/24/21  Palliative - 8/5/21 6/17/21  PCP -  TBD  Oncologist - 6/24/21  Infusion - 6/24/21  Palliative - 8/5/21 7/14/21 Call placed to patient, no answer. Voicemail left to return call.  Knowledge and adherence of prescribed medication (ie. action, side effects, missed dose, etc.). Taking prescribed meds including ABX until completed  Culture results pending     6/15/21  Levofloxacin ($) >=8 ug/mL R     Chart forward to oncology & PCP updating on results. 6/17/21 confirmed ABX switched to Keflex, currently taking until gone.  Supportive resources in place to maintain patient in the community (ie. Home Health, DME equipment, refer to, medication assistant plan, etc.)      [facility] - # [phone] 24/7  Nurse Access line 24/7  Be Well  [address] [place] [address] Urgent [facility] [phone]  HR Service Now - [facility] Workday  IT - [phone]  MyChart Help - 1- [phone]  Associate Services for advice and direction, by calling [phone] and pressing 1         Understands red flags post discharge. CP, SOB, cough, wheeze, temp >100, increase in pain not managed with pain med    6/17/21 pt denies red flags. Pt denies temp or s/s of infection. Pt reported having diarrhea approx 4-5 stools a day, but that is down from 10-12. Pt reported this has been a chronic issue, provider aware.            CM will f/u in 2 weeks

## 2021-07-16 NOTE — DISCHARGE INSTRUCTIONS
It was a pleasure taking care of you in our Emergency Department today. We know that when you come to [facility], you are entrusting us with your health, comfort, and safety. Our physicians and nurses honor that trust, and truly appreciate the opportunity to care for you and your loved ones. We also value your feedback. If you receive a survey about your Emergency Department experience today, please fill it out. We care about our patients' feedback, and we listen to what you have to say. Thank you!

## 2021-07-16 NOTE — ED PROVIDER NOTES
EMERGENCY DEPARTMENT HISTORY AND PHYSICAL EXAM      Date: 7/16/2021  Patient Name: Cassidy San Patient Age and Sex: 60 y.o. male    History of Presenting Illness     Chief Complaint   Patient presents with    Abdominal Pain     Pt complaining of nausea/vomiting with abdominal pain since yesterday morning. Pt also having low back pain with chills.  Vomiting       History Provided By: Patient    Ability to gather history was limited by:     HPI: Cassidy San, 60 y.o. male with history of stage IV cholangiocarcinoma, status post Whipple procedure, also history of diabetes, Sjogren's syndrome, complains of cough productive of sputum and mucus which started 2 or 3 days ago. He is having acute on chronic nausea, and a new mild aching pain in his left mid back. Also having sinus congestion and postnasal drip. No new abdominal pain. No reported fevers. No urinary symptoms. Location:    Quality:      Severity:    Duration:   Timing:      Context:    Modifying factors:   Associated symptoms:     Past History      The patient's medical, surgical, and social history on file were reviewed by me today.  The family history was reviewed by me today and was non-contributory, unless otherwise specified below:    Past Medical History:  Past Medical History:   Diagnosis Date    Aneurysm ([address] 2008    CEREBRAL    Arrhythmia     Previous a.fib, ablation, NSR now; NO LONGER FOLLOWED BY CARDIOLOGIST.     Autoimmune disease ([address]     SJOGREN'S    Cancer ([address] 2020    COMMON BILE DUCT, ADENOCARCINOMA, SPINE    Diabetes ([address]     NIDDM    Family history of skin cancer     Hypertension     Sepsis ([address] 2017    STENTING TO BILE DUCT    Status post chemotherapy     Stroke [facility]) 2008    brain aneurysm - no deficits    Sun-damaged skin     Sunburn, blistering        Past Surgical History:  Past Surgical History:   Procedure Laterality Date    HX CHOLECYSTECTOMY      HX GI      COLONOSCOPY, POLYPS (BENIGN)    HX GI  10/06/2017    [facility]     HX GI  2020    [place] REVISION    HX HEART CATHETERIZATION  2005    Ablation     HX HERNIA REPAIR  12/2020    HERNIA REPAIR    HX ORTHOPAEDIC  2000    Spinal fusion W/ HARDWARE    HX OTHER SURGICAL  11/07/2017    [place] [facility], Dr. Trisha Jose HX OTHER SURGICAL  01/11/2021    RIGHT IJ PORT-A-CATH PLACEMENT     HX VASCULAR ACCESS  2017    PORTACATH - then removed    IR KYPHOPLASTY LUMBAR  1/12/2021    NEUROLOGICAL PROCEDURE UNLISTED  2005    Ting hole washout from cerebral hemorrhage    NE ABDOMEN SURGERY PROC UNLISTED  10/2017    APRIL       Family History:  Family History   Problem Relation Age of Onset    Cancer Father         Breast and Colon, MELANOMA    Hypertension Father     Cancer Mother         LEUKEMIA    No Known Problems Sister     Atrial Fibrillation Brother     No Known Problems Sister     No Known Problems Son     No Known Problems Son     Anesth Problems Neg Hx        Social History:  Social History     Tobacco Use    Smoking status: Never Smoker    Smokeless tobacco: Never Used   Substance Use Topics    Alcohol use: Never     Comment: very rare    Drug use: No       Current Medications:  No current facility-administered medications on file prior to encounter. Current Outpatient Medications on File Prior to Encounter   Medication Sig Dispense Refill    vitamin E, dl,tocopheryl acet, (vitamin E acetate) 400 unit capsule TAKE 1 CAPSULE BY MOUTH ONE TIME A DAY 90 Capsule 3    vitamin A (AQUASOL A) 10,000 unit capsule TAKE 1 CAPSULE BY MOUTH ONE TIME A DAY 90 Capsule 3    opium tincture 10 mg/mL (morphine) liquid Take 1 mL by mouth every six (6) hours as needed for Diarrhea for up to 30 days. Max Daily Amount: 4 mL. 118 mL 0    diphenoxylate-atropine (LomotiL) 2.5-0.025 mg per tablet Take 1 Tab by mouth three (3) times daily as needed for Diarrhea. Max Daily Amount: 3 Tabs.  90 Tab 5    OLANZapine (ZyPREXA) 10 mg tablet Take 1 Tab by mouth See Admin Instructions. Take nightly for 4 days starting the evening of chemotherapy. 20 Tab 1    pantoprazole (PROTONIX) 40 mg tablet TAKE 1 TABLET BY MOUTH EVERY DAY 30 Tab 0    lidocaine-prilocaine (EMLA) topical cream Apply  to affected area as needed for Pain. 30-45 min prior to treatments 30 g 3    ondansetron (ZOFRAN ODT) 4 mg disintegrating tablet Take 1 Tab by mouth every eight (8) hours as needed for Nausea or Vomiting. 40 Tab 2    dexAMETHasone (DECADRON) 1 mg tablet Take 2 tablets by mouth twice daily for 14 days 56 Tab 0    dutasteride (AVODART) 0.5 mg capsule Take 1 Cap by mouth daily. 90 Cap 3    Creon 36,000-114,000- 180,000 unit cpDR capsule Take 4 Caps by mouth three (3) times daily (with meals). 2-3 caps each meal and 1 cap for snacks (see additional order) 1080 Cap 3    naloxone (NARCAN) 4 mg/actuation nasal spray Use 1 spray intranasally, then discard. Repeat with new spray every 2 min as needed for opioid overdose symptoms, alternating nostrils. 1 Each 0    aluminum & magnesium hydroxide-simethicone (Maalox Maximum Strength) 400-400-40 mg/5 mL suspension Take 10 mL by mouth every six (6) hours as needed for Indigestion.  vitamin E (AQUA GEMS) 400 unit capsule Take 1 Cap by mouth daily. 90 Cap 3    empagliflozin (Jardiance) 25 mg tablet Take 1 Tab by mouth nightly. 90 Tab 3    gabapentin (NEURONTIN) 100 mg capsule Take 3 capsules by mouth twice a day. 540 Cap 3    cyanocobalamin (VITAMIN B12) 1,000 mcg/mL injection INJECT CONTENTS OF ONE VIAL INTRAMUSCULARLY EVERY OTHER WEEK (Patient taking differently: INJECT CONTENTS OF ONE VIAL INTRAMUSCULARLY EVERY OTHER WEEK (FIRST AND 15TH OF EACH MONTH)) 6 mL 6    tamsulosin (FLOMAX) 0.4 mg capsule TAKE ONE CAPSULE BY MOUTH EVERY EVENING 90 Cap 3    ramipriL (ALTACE) 5 mg capsule Take 1 Cap by mouth daily.  (Patient taking differently: Take 5 mg by mouth nightly.) 90 Cap 3    lidocaine (LIDODERM) 5 % 1 Patch by TransDERmal route every twenty-four (24) hours. Apply patch to the affected area for 12 hours a day and remove for 12 hours a day. (Patient taking differently: 1 Patch by TransDERmal route daily as needed. Apply patch to the affected area for 12 hours a day and remove for 12 hours a day.) 30 Each 5    loperamide (IMODIUM) 2 mg capsule Take 2-4 mg by mouth as needed for Diarrhea. Give 4 mg after first loose stool. Give an additional 2 mg after each loose stool as needed up to a maximum of 8 doses (16 mg/day).  lipase-protease-amylase (Creon) 36,000-114,000- 180,000 unit cpDR capsule Take 1 Cap by mouth as needed (for snacks). 2-3 caps each meal and 1 cap for snacks (see additional order)      finasteride (PROSCAR) 5 mg tablet TAKE 1 TABLET BY MOUTH EVERY DAY      acetaminophen (TYLENOL) 325 mg tablet Take 2 Tabs by mouth every four (4) hours as needed for Pain or Fever (Over the counter medication). 1 Tab 0    sildenafil citrate (VIAGRA) 50 mg tablet Take 1 Tab by mouth as needed (ED). 30 Tab 1    alpha-D-galactosidase (BEANO PO) Take 1 Tab by mouth two (2) times a day.  cetirizine (ZYRTEC) 10 mg tablet Take 10 mg by mouth nightly. Allergies: Allergies   Allergen Reactions    Shellfish Derived Anaphylaxis     Soft shell crabs    Morphine Nausea and Vomiting    Pcn [Penicillins] Other (comments)     Pt stated \"always been told PCN\"  Pt tolerated other cephalosporins in the past     Review of Systems    A complete ROS was reviewed by me today and was negative, unless otherwise specified below:    Review of Systems   Constitutional: Positive for fatigue. Negative for fever. HENT: Positive for postnasal drip. Respiratory: Positive for cough. Negative for chest tightness, shortness of breath and wheezing. Cardiovascular: Negative for chest pain, palpitations and leg swelling. Gastrointestinal: Positive for abdominal pain, diarrhea, nausea and vomiting.    All other systems reviewed and are negative. Physical Exam   Vital Signs  Patient Vitals for the past 8 hrs:   Temp Pulse Resp BP SpO2   07/16/21 1300  63 15 106/83    07/16/21 1215  69 16 104/70    07/16/21 1130  67 14 108/64    07/16/21 1045  70 21 108/70    07/16/21 1018  67 15 124/74    07/16/21 0945  69 15 125/85    07/16/21 0859 98.6 °F (37 °C) 77 16 (!) 132/97 97 %          Physical Exam  Vitals and nursing note reviewed. Constitutional:       General: He is not in acute distress. Appearance: Normal appearance. He is well-developed. He is not ill-appearing. HENT:      Head: Normocephalic and atraumatic. Eyes:      General:         Right eye: No discharge. Left eye: No discharge. Conjunctiva/sclera: Conjunctivae normal.   Cardiovascular:      Rate and Rhythm: Normal rate and regular rhythm. Heart sounds: Normal heart sounds. No murmur heard. Pulmonary:      Effort: Pulmonary effort is normal. No respiratory distress. Breath sounds: Normal breath sounds. No wheezing, rhonchi or rales. Abdominal:      General: There is no distension. Palpations: Abdomen is soft. Tenderness: There is no abdominal tenderness. Musculoskeletal:         General: No deformity. Normal range of motion. Cervical back: Normal range of motion and neck supple. Skin:     General: Skin is warm and dry. Findings: No rash. Neurological:      General: No focal deficit present. Mental Status: He is alert and oriented to person, place, and time. Psychiatric:         Mood and Affect: Mood normal.         Behavior: Behavior normal.         Thought Content:  Thought content normal.         Diagnostic Study Results   Labs  Recent Results (from the past 24 hour(s))   CBC WITH AUTOMATED DIFF    Collection Time: 07/16/21  9:43 AM   Result Value Ref Range    WBC 11.6 (H) 4.1 - 11.1 K/uL    RBC 4.34 4.10 - 5.70 M/uL    HGB 13.5 12.1 - 17.0 g/dL    HCT 41.6 36.6 - 50.3 % MCV 95.9 80.0 - 99.0 FL    MCH 31.1 26.0 - 34.0 PG    MCHC 32.5 30.0 - 36.5 g/dL    RDW 13.2 11.5 - 14.5 %    PLATELET 478 (L) 232 - 400 K/uL    MPV 10.7 8.9 - 12.9 FL    NRBC 0.0 0  WBC    ABSOLUTE NRBC 0.00 0.00 - 0.01 K/uL    NEUTROPHILS 86 (H) 32 - 75 %    LYMPHOCYTES 6 (L) 12 - 49 %    MONOCYTES 6 5 - 13 %    EOSINOPHILS 1 0 - 7 %    BASOPHILS 0 0 - 1 %    IMMATURE GRANULOCYTES 1 (H) 0.0 - 0.5 %    ABS. NEUTROPHILS 10.0 (H) 1.8 - 8.0 K/UL    ABS. LYMPHOCYTES 0.7 (L) 0.8 - 3.5 K/UL    ABS. MONOCYTES 0.7 0.0 - 1.0 K/UL    ABS. EOSINOPHILS 0.1 0.0 - 0.4 K/UL    ABS. BASOPHILS 0.0 0.0 - 0.1 K/UL    ABS. IMM. GRANS. 0.1 (H) 0.00 - 0.04 K/UL    DF SMEAR SCANNED      RBC COMMENTS NORMOCYTIC, NORMOCHROMIC     METABOLIC PANEL, COMPREHENSIVE    Collection Time: 07/16/21  9:43 AM   Result Value Ref Range    Sodium 137 136 - 145 mmol/L    Potassium 4.4 3.5 - 5.1 mmol/L    Chloride 105 97 - 108 mmol/L    CO2 26 21 - 32 mmol/L    Anion gap 6 5 - 15 mmol/L    Glucose 115 (H) 65 - 100 mg/dL    BUN 16 6 - 20 MG/DL    Creatinine 0.75 0.70 - 1.30 MG/DL    BUN/Creatinine ratio 21 (H) 12 - 20      GFR est AA >60 >60 ml/min/1.73m2    GFR est non-AA >60 >60 ml/min/1.73m2    Calcium 8.5 8.5 - 10.1 MG/DL    Bilirubin, total 1.4 (H) 0.2 - 1.0 MG/DL    ALT (SGPT) 92 (H) 12 - 78 U/L    AST (SGOT) 61 (H) 15 - 37 U/L    Alk.  phosphatase 209 (H) 45 - 117 U/L    Protein, total 7.0 6.4 - 8.2 g/dL    Albumin 3.5 3.5 - 5.0 g/dL    Globulin 3.5 2.0 - 4.0 g/dL    A-G Ratio 1.0 (L) 1.1 - 2.2     LIPASE    Collection Time: 07/16/21  9:43 AM   Result Value Ref Range    Lipase 30 (L) 73 - 393 U/L   PROCALCITONIN    Collection Time: 07/16/21 10:03 AM   Result Value Ref Range    Procalcitonin 0.47 ng/mL   URINALYSIS W/ REFLEX CULTURE    Collection Time: 07/16/21 11:06 AM    Specimen: Urine   Result Value Ref Range    Color YELLOW/STRAW      Appearance CLEAR CLEAR      Specific gravity 1.017 1.003 - 1.030      pH (UA) 6.5 5.0 - 8.0      Protein Negative NEG mg/dL    Glucose >1,000 (A) NEG mg/dL    Ketone 40 (A) NEG mg/dL    Bilirubin Negative NEG      Blood Negative NEG      Urobilinogen 1.0 0.2 - 1.0 EU/dL    Nitrites Negative NEG      Leukocyte Esterase Negative NEG      WBC 0-4 0 - 4 /hpf    RBC 0-5 0 - 5 /hpf    Epithelial cells FEW FEW /lpf    Bacteria Negative NEG /hpf    UA:UC IF INDICATED CULTURE NOT INDICATED BY UA RESULT CNI      Hyaline cast 0-2 0 - 5 /lpf       Radiologic Studies  XR CHEST PA LAT   Final Result   No acute process or change compared to the prior exam.           CT Results  (Last 48 hours)    None        CXR Results  (Last 48 hours)               07/16/21 1005  XR CHEST PA LAT Final result    Impression:  No acute process or change compared to the prior exam.           Narrative:  Exam:  2 view chest       Indication: Cough, wheezing, abdominal pain       Comparison to 6/12/2021. PA and lateral views demonstrate normal heart size. There is no acute process in   the lung fields. The osseous structures are unremarkable. Port-A-Cath is   unchanged in position. Billable Procedures   Procedures    Cardiac monitoring was not ordered for this patient. Medical Decision Making     I reviewed the patient's most recent Emergency Dept notes and diagnostic tests in formulating my MDM on today's visit. Provider Notes (Medical Decision Making):   28-year-old male with stage IV cholangiocarcinoma and diabetes now complaining of a few days of cough productive of sputum, postnasal drip, and new mild to moderate aching left mid back pain. On examination he appears chronically ill but in no significant distress. Normal vital signs, afebrile. No hypoxia. His lungs sound completely clear to me, no rales or rhonchi noted. Abdomen is soft, mild chronic right upper quadrant/epigastric tenderness not different than baseline. Laboratories and x-ray are reassuring, no acute findings, no acute infiltrate.   Doubt pneumonia or other significant infectious process. Sounds as though a lot of his symptoms are consistent with chronic rhinitis, which has not responded to Flonase and multiple other over-the-counter treatments. We will try Astepro nasal antihistamine. I also prescribed levofloxacin with a watch and wait approach, which she will start if he develops any further symptoms or cough or fever. Orly Sanon MD  9:57 AM  7/16/2021     Consults:    Social History     Tobacco Use    Smoking status: Never Smoker    Smokeless tobacco: Never Used   Substance Use Topics    Alcohol use: Never     Comment: very rare    Drug use: No       Medications Administered during ED course:  Medications   HYDROmorphone (DILAUDID) injection 1 mg (1 mg IntraVENous Given 7/16/21 1013)   ondansetron (ZOFRAN) injection 4 mg (4 mg IntraVENous Given 7/16/21 1009)   0.9% sodium chloride infusion 1,000 mL (0 mL IntraVENous IV Completed 7/16/21 1113)   HYDROmorphone (DILAUDID) injection 1 mg (1 mg IntraVENous Given 7/16/21 1113)          Prescriptions from today's ED visit:  Discharge Medication List as of 7/16/2021 12:53 PM      START taking these medications    Details   levoFLOXacin (LEVAQUIN) 750 mg tablet Take 1 Tablet by mouth daily for 10 days. , Print, Disp-10 Tablet, R-0      azelastine (ASTEPRO) 205.5 mcg (0.15 %) 2 Sprays by Both Nostrils route two (2) times a day., Normal, Disp-1 Bottle, R-0         CONTINUE these medications which have NOT CHANGED    Details   vitamin E, dl,tocopheryl acet, (vitamin E acetate) 400 unit capsule TAKE 1 CAPSULE BY MOUTH ONE TIME A DAY, Normal, Disp-90 Capsule, R-3      vitamin A (AQUASOL A) 10,000 unit capsule TAKE 1 CAPSULE BY MOUTH ONE TIME A DAY, Normal, Disp-90 Capsule, R-3      opium tincture 10 mg/mL (morphine) liquid Take 1 mL by mouth every six (6) hours as needed for Diarrhea for up to 30 days.  Max Daily Amount: 4 mL., Normal, Disp-118 mL, R-0      diphenoxylate-atropine (LomotiL) 2.5-0.025 mg per tablet Take 1 Tab by mouth three (3) times daily as needed for Diarrhea. Max Daily Amount: 3 Tabs., Normal, Disp-90 Tab, R-5      OLANZapine (ZyPREXA) 10 mg tablet Take 1 Tab by mouth See Admin Instructions. Take nightly for 4 days starting the evening of chemotherapy., Normal, Disp-20 Tab, R-1      pantoprazole (PROTONIX) 40 mg tablet TAKE 1 TABLET BY MOUTH EVERY DAY, Normal, Disp-30 Tab, R-0      lidocaine-prilocaine (EMLA) topical cream Apply  to affected area as needed for Pain. 30-45 min prior to treatments, Normal, Disp-30 g, R-3      ondansetron (ZOFRAN ODT) 4 mg disintegrating tablet Take 1 Tab by mouth every eight (8) hours as needed for Nausea or Vomiting., Normal, Disp-40 Tab, R-2      dexAMETHasone (DECADRON) 1 mg tablet Take 2 tablets by mouth twice daily for 14 days, Normal, Disp-56 Tab, R-0      dutasteride (AVODART) 0.5 mg capsule Take 1 Cap by mouth daily. , Normal, Disp-90 Cap, R-3      !! Creon 36,000-114,000- 180,000 unit cpDR capsule Take 4 Caps by mouth three (3) times daily (with meals). 2-3 caps each meal and 1 cap for snacks (see additional order), Normal, Disp-1080 Cap, R-3, PREETI      naloxone (NARCAN) 4 mg/actuation nasal spray Use 1 spray intranasally, then discard. Repeat with new spray every 2 min as needed for opioid overdose symptoms, alternating nostrils. , Normal, Disp-1 Each,R-0      aluminum & magnesium hydroxide-simethicone (Maalox Maximum Strength) 400-400-40 mg/5 mL suspension Take 10 mL by mouth every six (6) hours as needed for Indigestion. , Historical Med      vitamin E (AQUA GEMS) 400 unit capsule Take 1 Cap by mouth daily. , Normal, Disp-90 Cap,R-3      empagliflozin (Jardiance) 25 mg tablet Take 1 Tab by mouth nightly., Normal, Disp-90 Tab,R-3      gabapentin (NEURONTIN) 100 mg capsule Take 3 capsules by mouth twice a day., Normal, Disp-540 Cap,R-3      cyanocobalamin (VITAMIN B12) 1,000 mcg/mL injection INJECT CONTENTS OF ONE VIAL INTRAMUSCULARLY EVERY OTHER WEEK, No Print, Disp-6 mL,R-6      tamsulosin (FLOMAX) 0.4 mg capsule TAKE ONE CAPSULE BY MOUTH EVERY EVENING, Normal, Disp-90 Cap,R-3      ramipriL (ALTACE) 5 mg capsule Take 1 Cap by mouth daily. , Normal, Disp-90 Cap,R-3      lidocaine (LIDODERM) 5 % 1 Patch by TransDERmal route every twenty-four (24) hours. Apply patch to the affected area for 12 hours a day and remove for 12 hours a day., Normal, Disp-30 Each,R-5      loperamide (IMODIUM) 2 mg capsule Take 2-4 mg by mouth as needed for Diarrhea. Give 4 mg after first loose stool. Give an additional 2 mg after each loose stool as needed up to a maximum of 8 doses (16 mg/day). , Historical Med      !! lipase-protease-amylase (Creon) 36,000-114,000- 180,000 unit cpDR capsule Take 1 Cap by mouth as needed (for snacks). 2-3 caps each meal and 1 cap for snacks (see additional order), Historical Med      finasteride (PROSCAR) 5 mg tablet TAKE 1 TABLET BY MOUTH EVERY DAY, Historical Med      acetaminophen (TYLENOL) 325 mg tablet Take 2 Tabs by mouth every four (4) hours as needed for Pain or Fever (Over the counter medication). , Normal, Disp-1 Tab, R-0Over the counter medication      sildenafil citrate (VIAGRA) 50 mg tablet Take 1 Tab by mouth as needed (ED)., Normal, Disp-30 Tab, R-1      alpha-D-galactosidase (BEANO PO) Take 1 Tab by mouth two (2) times a day., Historical Med      cetirizine (ZYRTEC) 10 mg tablet Take 10 mg by mouth nightly., Historical Med       !! - Potential duplicate medications found. Please discuss with provider. Diagnosis and Disposition     Disposition:  Discharged    Clinical Impression:   1. Rhinitis, chronic    2. Cancer associated pain    3. Cough        Attestation:  I personally performed the services described in this documentation on this date 7/16/2021 for patient Abraham Sam. Piper Melara MD        I was the first provider for this patient on this visit.   To the best of my ability I reviewed relevant prior medical records, electrocardiograms, laboratories, and radiologic studies. The patient's presenting problems were discussed, and the patient was in agreement with the care plan formulated and outlined with them. Henok Plata MD    Please note that this dictation was completed with Dragon voice recognition software. Quite often unanticipated grammatical, syntax, homophones, and other interpretive errors are inadvertently transcribed by the computer software. Please disregard these errors and excuse any errors that have escaped final proofreading.

## 2021-07-16 NOTE — TELEPHONE ENCOUNTER
Reason for Disposition   Caller has already spoken with the PCP and has no further questions. Protocols used: NO CONTACT OR DUPLICATE CONTACT CALL-ADULT-AH    Patient's spouse, Denny Cesar, calling to inform that the patient's oncologist, Dr. Iker Flannery, has referred him to the ED. Patient is currently on chemo and c/o N/V/D, diaphoresis, and chest congestion. Denies any further questions at this time. Instructed to call back with any further questions; v/u. Attention Provider: Thank you for allowing me to participate in the care of your patient. The patient was connected to triage in response to symptoms provided. Please do not respond through this encounter as the response is not directed to a shared pool.

## 2021-07-16 NOTE — ED NOTES
Pt reporting nausea around 11 am yesterday w/ vomiting and diarrhea x 2. Additionally p reporting \"burning\" abdominal discomfort and family noting he had chills but no noted fevers. Pt has had poor PO intake since Wednesday night at dinner. Pt took 4 mg sublingual zofran around 0700. Pt additionally \"coughing up phlem\" and family notes that he has been coughing and wheezing at home. Pt had episode of bradycardia and placed on the monitor x 3.     0950 MD at bedside     1100 Pt urine specimen collected. Pt reporting improved pain and nausea, but not resolved. Per family, pt typically requires 3 mg dilaudid IV or 4 mg PO for pain control, MD notified. Orders received     1250 MD at bedside     1310 Pt discharged by Dr Ceci Mg. Pt provided with discharge instructions Rx and instructions on follow up care.  Pt ambulatory out of ED

## 2021-07-16 NOTE — TELEPHONE ENCOUNTER
Patient called - thinks he may have pnemonia - was headed to get a chest xray and wanted to know if he can get fluids today or should he go to the ER.

## 2021-07-19 NOTE — PROGRESS NOTES
CCM Outreach     Call placed to pt, Verified  and address for HIPAA security. Goals      Attends follow-up appointments as directed. PCP -  TBD  Oncologist - 21  Infusion - 21  Palliative - 21  PCP -  TBD  Oncologist - 21  Infusion - 21  Palliative - 21 Call placed to patient, no answer. Voicemail left to return call. 21  Pt was seen at [facility] ER 21. Diagnosis Comment   Rhinitis, chronic    Cancer associated pain    Cough      PCP - TBD  Oncology - 21  Palliative - 21  Infusion - 21        Knowledge and adherence of prescribed medication (ie. action, side effects, missed dose, etc.). Taking prescribed meds including ABX until completed  Culture results pending     6/15/21  Levofloxacin ($) >=8 ug/mL R     Chart forward to oncology & PCP updating on results. 21 confirmed ABX switched to Keflex, currently taking until gone. 21 Pt reported he filled prescribed ABX last night, taking until completed.  Patient verbalizes understanding of self -management goals of living with Diabetes. Lab Results   Component Value Date/Time    Hemoglobin A1c 5.5 10/15/2020 10:50 AM    Hemoglobin A1c (POC) 6.4 2019 03:50 AM     BS - 106       Supportive resources in place to maintain patient in the community (ie. Home Health, DME equipment, refer to, medication assistant plan, etc.)      [facility] - # [phone]   Nurse Access line   [facility]  [address] [facility] Urgent [facility] [phone]  [facility] - [place]  [facility] Workday  IT - [phone]  MyChart Help - 1- [phone]  [facility] for advice and direction, by calling [phone] and pressing 1         Understands red flags post discharge. CP, SOB, cough, wheeze, temp >100, increase in pain not managed with pain med    21 pt denies red flags.  Pt denies temp or s/s of infection. Pt reported having diarrhea approx 4-5 stools a day, but that is down from 10-12. Pt reported this has been a chronic issue, provider aware. 7/19/21 pt denies red flags. Denies pain, N/V, cough, wheeze, SOB or temp. BM today, denies blood. Reported appetite has improved. CM will f/u as previously scheduled.

## 2021-07-20 NOTE — PROGRESS NOTES
Tati Toussaint. is a 60 y.o. male here for follow up for cholangiocarcinoma. Treatment today:  Cycle 7 Day 1 Nab-Paclitaxel + Gemcitabine + Cisplatin  Receiving Xgeva today. 1. Have you been to the ER, urgent care clinic since your last visit? Hospitalized since your last visit? 7/16/21 for abdominal pain and vomiting    2. Have you seen or consulted any other health care providers outside of the [address] [place] [address] since your last visit? Include any pap smears or colon screening. no    Pt states he has been doing better since hospital visit. States he goes through spells of leg numbness that starts rising higher on his legs, then he gets very cold and starts having nausea and vomiting, phlegm and congestion. He receives fluids then everything is fine for awhile.

## 2021-07-22 NOTE — PROGRESS NOTES
[facility]fusion [facility] Progress Note    0900 - Pt admit to [facility] for C7D1 Abraxane/Gemzar/Cisplatin in stable condition. Assessment completed. Patient denied having any symptoms of COVID-19, i.e. SOB, coughing, fever, or generally not feeling well. Also denies having been exposed to COVID-19 recently or having had any recent contact with family/friend that has a pending COVID test.     R chest port accessed with 0.75\" Rashida Inna with positive blood return. Labs drawn per order and sent. Line flushed, clamped, Curos Cap applied to end clave. To office for scheduled appt. Chemotherapy Flowsheet 7/22/2021   Cycle C7D1   Date 7/22/2021   Drug / Regimen Abraxane/[place]/Cis   Pre Hydration -   Pre Meds Hydration given   Notes HELD/Xgeva HELD        Patient Vitals for the past 12 hrs:   Temp Pulse Resp BP SpO2   07/22/21 1112  64  102/64    07/22/21 0905 97.9 °F (36.6 °C) 73 18 (!) 95/59 100 %        Recent Results (from the past 12 hour(s))   CBC WITH 3 PART DIFF    Collection Time: 07/22/21  9:14 AM   Result Value Ref Range    WBC 3.9 (L) 4.1 - 11.1 K/uL    RBC 3.98 (L) 4.10 - 5.70 M/uL    HGB 12.1 12.1 - 17.0 g/dL    HCT 38.3 36.6 - 50.3 %    MCV 96.2 80.0 - 99.0 FL    MCH 30.4 26.0 - 34.0 PG    MCHC 31.6 30.0 - 36.5 g/dL    RDW 13.6 11.8 - 15.8 %    PLATELET 470 (L) 869 - 400 K/uL    NEUTROPHILS 77 (H) 32 - 75 %    MIXED CELLS 8 3.2 - 16.9 %    LYMPHOCYTES 15 12 - 49 %    ABS. NEUTROPHILS 3.0 1.8 - 8.0 K/UL    ABS. MIXED CELLS 0.3 0.2 - 1.2 K/uL    ABS.  LYMPHOCYTES 0.6 (L) 0.8 - 3.5 K/UL    DF AUTOMATED     METABOLIC PANEL, COMPREHENSIVE    Collection Time: 07/22/21  9:14 AM   Result Value Ref Range    Sodium 140 136 - 145 mmol/L    Potassium 4.0 3.5 - 5.1 mmol/L    Chloride 110 (H) 97 - 108 mmol/L    CO2 25 21 - 32 mmol/L    Anion gap 5 5 - 15 mmol/L    Glucose 120 (H) 65 - 100 mg/dL    BUN 14 6 - 20 MG/DL    Creatinine 0.66 (L) 0.70 - 1.30 MG/DL    BUN/Creatinine ratio 21 (H) 12 - 20      GFR est AA >60 >60 ml/min/1.73m2    GFR est non-AA >60 >60 ml/min/1.73m2    Calcium 7.8 (L) 8.5 - 10.1 MG/DL    Bilirubin, total 0.5 0.2 - 1.0 MG/DL    ALT (SGPT) 43 12 - 78 U/L    AST (SGOT) 21 15 - 37 U/L    Alk. phosphatase 157 (H) 45 - 117 U/L    Protein, total 6.1 (L) 6.4 - 8.2 g/dL    Albumin 3.3 (L) 3.5 - 5.0 g/dL    Globulin 2.8 2.0 - 4.0 g/dL    A-G Ratio 1.2 1.1 - 2.2     MAGNESIUM    Collection Time: 07/22/21  9:14 AM   Result Value Ref Range    Magnesium 2.4 1.6 - 2.4 mg/dL   PHOSPHORUS    Collection Time: 07/22/21  9:14 AM   Result Value Ref Range    Phosphorus 2.5 (L) 2.6 - 4.7 MG/DL        Pt back from office stating he is not being treated today and is just getting fluids. Verified with office. Medications:  Medications Administered     sodium chloride 0.9 % bolus infusion 1,000 mL     Admin Date  07/22/2021 Action  New Bag Dose  1,000 mL Rate  1,000 mL/hr Route  IntraVENous Administered By  Moraima Mooney held today due to corrected calcium 8.36. Nora at office notified. Pt tolerated treatment well. Port maintained positive blood return throughout treatment, flushed with positive blood return at conclusion, and de-accessed. 1115 - D/c home in no distress. Pt aware of next [facility] appointment scheduled for: 7/29/21 at 0900.      Future Appointments   Date Time Provider Sloane Roach   7/29/2021  9:00 AM CHONG  CHAIR 2 [facility] REG   8/5/2021  9:30 AM Sparkle Gonzalez MD [facility] BS AMB   8/12/2021  9:00 AM CHAIR 3 [address] REG   8/19/2021  9:00 AM CHAIR 3 [address] REG

## 2021-07-22 NOTE — PROGRESS NOTES
[address] [facility] [address], [address] [facility], [address] [place] [address]  [phone]    Follow-up Note      Patient: Jose Linda MRN: [id number]  SSN: [id number]    YOB: 1955  Age: 60 y.o. Sex: male        Diagnosis:     1. 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 S/P Pancreatoduodenectomy on  10/06/2017  2. Adjuvant monotherapy with Capecitabine - s/p 6 cycles   (12/4/2017 - 05/2018)  3. SBRT RP node/soft tissue 04/2020  4. Palliative chemotherapy   Cis/[place]/Abraxane - s/p 6 cycles - on hold    Subjective:      Jose Linda is a 60 y.o. male with a diagnosis of extrahepatic cholangiocarcinoma with metastatic to the lungs, bones and retroperitoneal node/soft tissue. He presented to the ED in August 2017 with obstructive jaundice. He was found to have an obstructive lesion in the dital bile duct. The CT showed marked intrahepatic biliary dilation and common bile duct dilation to the level of the mid common bile duct, which ws markedly narrowed. He underwent a stenting procedure followed by spyglass cholangiogram. The brushings revealed a diagnosis of cholangiocarcinoma. He underwent a Whipple procedure on 10/06/2017. He completed 6 cycles of adjuvant Xeloda. PET scan showed increased activity in the soft tissue along the SMA. CT guided biopsy showed local recurrence. He underwent SBRT by Dr. Blair Burch in April 2020. He was admitted with severe back pain. Repeat CT shows growth of tumor in the L4/L5, lung and RP node/soft tissue. He underwent a CT guided celiac plexus block which has helped the back pain immensely. A biopsy of the L4 vertebrae confirms a diagnosis of metastatic cholangiocarcinoma.      Mr. Avtar Ling is receiving palliative chemotherapy. He has been off of treatment since the end of May. He has been levaquin since 7/16 when he was in the ED. He continues to have \"episodes\" of congestion, nausea and cold sensation that improves with a liter of fluids. We discussed restarting treatment today and mutually agreed to delay x 2 weeks, give Liter of NS today and next week with the plan of treatment following. He agrees to this plan. Review of Systems:    Constitutional: fatigue  Eyes: negative  Ears, Nose, Mouth, Throat, and Face: negative  Respiratory: negative  Cardiovascular: negative  Gastrointestinal: nausea, diarrhea (controlled with tincture of opium  Genitourinary:negative  Integument/Breast: negative  Hematologic/Lymphatic: negative  Musculoskeletal: B/L ankle edema  Neurological: numbness/tingling B/L feet        Past Medical History:   Diagnosis Date    Aneurysm ([address] 2008    CEREBRAL    Arrhythmia     Previous a.fib, ablation, NSR now; NO LONGER FOLLOWED BY CARDIOLOGIST.     Autoimmune disease ([address]     SJOGREN'S    Cancer ([address] 2020    COMMON BILE DUCT, ADENOCARCINOMA, SPINE    Diabetes ([address]     NIDDM    Family history of skin cancer     Hypertension     Sepsis ([address] 2017    STENTING TO BILE DUCT    Status post chemotherapy     Stroke [facility]) 2008    brain aneurysm - no deficits    Sun-damaged skin     Sunburn, blistering      Past Surgical History:   Procedure Laterality Date    HX CHOLECYSTECTOMY      HX GI      COLONOSCOPY, POLYPS (BENIGN)    HX GI  10/06/2017    [place] Dr. Indira Lezama [facility]     HX GI  2020    WHIPPLE REVISION    HX HEART CATHETERIZATION  2005    Ablation     HX HERNIA REPAIR  12/2020    HERNIA REPAIR    HX ORTHOPAEDIC  2000    Spinal fusion W/ HARDWARE    HX OTHER SURGICAL  11/07/2017    [place], Dr. Ariel Kevin HX OTHER SURGICAL  01/11/2021    RIGHT IJ PORT-A-CATH PLACEMENT     HX VASCULAR ACCESS  2017    PORTACATH - then removed    IR KYPHOPLASTY LUMBAR  1/12/2021    NEUROLOGICAL PROCEDURE UNLISTED  2005    [place] hole washout from cerebral hemorrhage    DE ABDOMEN SURGERY PROC UNLISTED  10/2017    APRIL      Family History   Problem Relation Age of Onset    Cancer Father         Breast and Colon, MELANOMA    Hypertension Father     Cancer Mother         LEUKEMIA    No Known Problems Sister     Atrial Fibrillation Brother     No Known Problems Sister     No Known Problems Son     No Known Problems Son     Anesth Problems Neg Hx      Social History     Tobacco Use    Smoking status: Never Smoker    Smokeless tobacco: Never Used   Substance Use Topics    Alcohol use: Never     Comment: very rare      Prior to Admission medications    Medication Sig Start Date End Date Taking? Authorizing Provider   azelastine (ASTEPRO) 205.5 mcg (0.15 %) 2 Sprays by Both Nostrils route two (2) times a day. 7/16/21  Yes Ivan Jin MD   vitamin E, dl,tocopheryl acet, (vitamin E acetate) 400 unit capsule TAKE 1 CAPSULE BY MOUTH ONE TIME A DAY 6/16/21  Yes Silas Raya III, DO   vitamin A (AQUASOL A) 10,000 unit capsule TAKE 1 CAPSULE BY MOUTH ONE TIME A DAY 6/16/21  Yes Silas Raya III, DO   opium tincture 10 mg/mL (morphine) liquid Take 1 mL by mouth every six (6) hours as needed for Diarrhea for up to 30 days. Max Daily Amount: 4 mL. 6/29/21 7/29/21 Yes Mary Hernandez MD   diphenoxylate-atropine (LomotiL) 2.5-0.025 mg per tablet Take 1 Tab by mouth three (3) times daily as needed for Diarrhea. Max Daily Amount: 3 Tabs. 4/27/21  Yes Negrita Brasher MD   OLANZapine (ZyPREXA) 10 mg tablet Take 1 Tab by mouth See Admin Instructions. Take nightly for 4 days starting the evening of chemotherapy. 1/29/21  Yes Yanelis Hernandez MD   pantoprazole (PROTONIX) 40 mg tablet TAKE 1 TABLET BY MOUTH EVERY DAY 1/11/21  Yes Mary Hernandez MD   lidocaine-prilocaine (EMLA) topical cream Apply  to affected area as needed for Pain.  30-45 min prior to treatments 1/7/21  Yes Jaron Ana Paula Lea MD   ondansetron (ZOFRAN ODT) 4 mg disintegrating tablet Take 1 Tab by mouth every eight (8) hours as needed for Nausea or Vomiting. 1/7/21  Yes Landy Wayne MD   dexAMETHasone (DECADRON) 1 mg tablet Take 2 tablets by mouth twice daily for 14 days 12/31/20  Yes Inez Lennon MD   dutasteride (AVODART) 0.5 mg capsule Take 1 Cap by mouth daily. 12/21/20  Yes Андрей Dillard DO   Creon 36,000-114,000- 180,000 unit cpDR capsule Take 4 Caps by mouth three (3) times daily (with meals). 2-3 caps each meal and 1 cap for snacks (see additional order) 12/21/20  Yes Андрей Dillard DO   aluminum & magnesium hydroxide-simethicone (Maalox Maximum Strength) 400-400-40 mg/5 mL suspension Take 10 mL by mouth every six (6) hours as needed for Indigestion. Yes Provider, Historical   vitamin E (AQUA GEMS) 400 unit capsule Take 1 Cap by mouth daily. 10/26/20  Yes Silas Raya III,    empagliflozin (Jardiance) 25 mg tablet Take 1 Tab by mouth nightly. 10/20/20  Yes Silas Raya III, DO   gabapentin (NEURONTIN) 100 mg capsule Take 3 capsules by mouth twice a day. 10/20/20  Yes Silas Raya III, DO   cyanocobalamin (VITAMIN B12) 1,000 mcg/mL injection INJECT CONTENTS OF ONE VIAL INTRAMUSCULARLY EVERY OTHER WEEK  Patient taking differently: INJECT CONTENTS OF ONE VIAL INTRAMUSCULARLY EVERY OTHER WEEK (FIRST AND 15TH OF EACH MONTH) 10/20/20  Yes Silas Raya III,    tamsulosin (FLOMAX) 0.4 mg capsule TAKE ONE CAPSULE BY MOUTH EVERY EVENING 10/20/20  Yes Silas Raya III, DO   ramipriL (ALTACE) 5 mg capsule Take 1 Cap by mouth daily. Patient taking differently: Take 5 mg by mouth nightly. 10/20/20  Yes Silas Raya III, DO   lidocaine (LIDODERM) 5 % 1 Patch by TransDERmal route every twenty-four (24) hours. Apply patch to the affected area for 12 hours a day and remove for 12 hours a day.   Patient taking differently: 1 Patch by TransDERmal route daily as needed. Apply patch to the affected area for 12 hours a day and remove for 12 hours a day. 10/15/20  Yes Silas Raya III, DO   loperamide (IMODIUM) 2 mg capsule Take 2-4 mg by mouth as needed for Diarrhea. Give 4 mg after first loose stool. Give an additional 2 mg after each loose stool as needed up to a maximum of 8 doses (16 mg/day). Yes Provider, Historical   lipase-protease-amylase (Creon) 36,000-114,000- 180,000 unit cpDR capsule Take 1 Cap by mouth as needed (for snacks). 2-3 caps each meal and 1 cap for snacks (see additional order)   Yes Provider, Historical   finasteride (PROSCAR) 5 mg tablet TAKE 1 TABLET BY MOUTH EVERY DAY 4/13/20  Yes Provider, Historical   acetaminophen (TYLENOL) 325 mg tablet Take 2 Tabs by mouth every four (4) hours as needed for Pain or Fever (Over the counter medication). 1/2/20  Yes Pratik Diaz NP   sildenafil citrate (VIAGRA) 50 mg tablet Take 1 Tab by mouth as needed (ED). 5/6/19  Yes Silas Raya III, DO   alpha-D-galactosidase (BEANO PO) Take 1 Tab by mouth two (2) times a day. Yes Other, MD Flynn   cetirizine (ZYRTEC) 10 mg tablet Take 10 mg by mouth nightly. Yes Provider, Historical   levoFLOXacin (LEVAQUIN) 750 mg tablet Take 1 Tablet by mouth daily for 10 days. Patient not taking: Reported on 7/22/2021 7/16/21 7/26/21  Ivan Jin MD   naloxone [facility]) 4 mg/actuation nasal spray Use 1 spray intranasally, then discard. Repeat with new spray every 2 min as needed for opioid overdose symptoms, alternating nostrils.   Patient not taking: Reported on 7/19/2021 12/11/20   Ragini Hughes MD          Allergies   Allergen Reactions    Shellfish Derived Anaphylaxis     Soft shell crabs    Morphine Nausea and Vomiting    Pcn [Penicillins] Other (comments)     Pt stated \"always been told PCN\"  Pt tolerated other cephalosporins in the past           Objective:     Visit Vitals  BP (!) 95/59   Pulse 73   Temp 97.9 °F (36.6 °C) Resp 18   Ht 5' 8\" (1.727 m)   Wt 154 lb (69.9 kg)   SpO2 100%   BMI 23.42 kg/m²     Pain Scale: 0 - No pain/10        Physical Exam:     GENERAL: alert, cooperative, no distress, appears stated age  EYE: negative  LYMPHATIC: Cervical, supraclavicular, and axillary nodes normal.   THROAT & NECK: normal and no erythema or exudates noted. LUNG: clear to auscultation bilaterally  HEART: regular rate and rhythm  ABDOMEN: soft, non-tender  EXTREMITIES: trace ankle edema  SKIN: Scabs on top of head  NEUROLOGIC: negative      Physical exam was pulled in from a previous visit. No changes were made d/t physical exam remains the same. Lab Results   Component Value Date/Time    WBC 3.9 (L) 07/22/2021 09:14 AM    Hemoglobin (POC) 10.3 (L) 10/06/2017 01:14 PM    HGB 12.1 07/22/2021 09:14 AM    Hematocrit (POC) 28 (L) 05/13/2021 09:16 AM    HCT 38.3 07/22/2021 09:14 AM    PLATELET 731 (L) 27/58/2999 09:14 AM    MCV 96.2 07/22/2021 09:14 AM       Lab Results   Component Value Date/Time    Sodium 140 07/22/2021 09:14 AM    Potassium 4.0 07/22/2021 09:14 AM    Chloride 110 (H) 07/22/2021 09:14 AM    CO2 25 07/22/2021 09:14 AM    Anion gap 5 07/22/2021 09:14 AM    Glucose 120 (H) 07/22/2021 09:14 AM    BUN 14 07/22/2021 09:14 AM    Creatinine 0.66 (L) 07/22/2021 09:14 AM    BUN/Creatinine ratio 21 (H) 07/22/2021 09:14 AM    GFR est AA >60 07/22/2021 09:14 AM    GFR est non-AA >60 07/22/2021 09:14 AM    Calcium 7.8 (L) 07/22/2021 09:14 AM    Bilirubin, total 0.5 07/22/2021 09:14 AM    Alk.  phosphatase 157 (H) 07/22/2021 09:14 AM    Protein, total 6.1 (L) 07/22/2021 09:14 AM    Albumin 3.3 (L) 07/22/2021 09:14 AM    Globulin 2.8 07/22/2021 09:14 AM    A-G Ratio 1.2 07/22/2021 09:14 AM    ALT (SGPT) 43 07/22/2021 09:14 AM    AST (SGOT) 21 07/22/2021 09:14 AM       CT Results (most recent):  Results from Hospital Encounter encounter on 05/12/21    CT ABD PELV W CONT    Narrative  EXAMINATION:  CT CHEST W CONT, CT ABD PELV W CONT  INDICATION:  restaging  COMPARISON: 3/1/2021. CONTRAST: 100 mL of Isovue-370. TECHNIQUE:  Following the uneventful intravenous administration of 100 cc  Isovue-370,  axial images were obtained through the chest, abdomen, and pelvis. Oral contrast was  administered. Coronal and sagittal reformatted images were  generated. CT dose reduction was achieved through use of a standardized protocol  tailored for this examination and automatic exposure control for dose  modulation. CT CHEST:  THYROID: Unremarkable. MEDIASTINUM/ANGEL: No mass or lymphadenopathy. HEART/PERICARDIUM: Tip of the infusion catheter just superior to atrial caval  junction. Mild cardiomegaly. Coronary artery calcification. .  VESSELS: No aneurysm or dissection. LUNGS: No change in tiny pulmonary nodules. No new nodules or masses. No acute  airspace disease. Kwan Golas PLEURA: No effusion. CHEST WALL: No significant abnormality. CT ABDOMEN AND PELVIS:  LIVER: Pneumobilia again noted. No focal mass. Wedge-shaped area of increased  density in the superior right lobe, consistent with a perfusion abnormality. GALLBLADDER: Surgically absent. Unchanged pneumobilia. No significant biliary  dilatation. SPLEEN: Unchanged small hypodense lesion. PANCREAS: Status post Whipple procedure. Unremarkable appearance of the  remainder of the pancreas. ADRENALS: Unremarkable. KIDNEYS: No significant change in the left perinephric stranding and of the soft  tissue density extending into the right renal hilus. Mild left hydronephrosis. 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 Normal appearance of the right kidney. STOMACH: Unremarkable. SMALL BOWEL: No dilatation or wall thickening. COLON: No dilatation or wall thickening. Fecal retention. Diverticulosis. APPENDIX: Nondistended.   PERITONEUM: No ascites or pneumoperitoneum. RETROPERITONEUM: No significant change in para-aortic soft tissue mass at the  level of the renal arteries with the long axis of approximately 5.3 cm. No other  retroperitoneal adenopathy or mass. REPRODUCTIVE ORGANS: Unremarkable. URINARY BLADDER: Unremarkable. PELVIC LYMPH NODES: None enlarged. BONES: Postsurgical changes at L5-S1. Prior L5 kyphoplasty. No destructive  osseous lesions. .  ADDITIONAL COMMENTS:  N/A    Impression  1. Unchanged appearance of the chest, abdomen, and pelvis when compared to  3/1/2021. No acute findings or evidence of disease progression. 2. Unchanged small pulmonary nodules. Unchanged retroperitoneal soft tissue mass  with left renal vein occlusion and collateral retroperitoneal venous structures. I personally reviewed the images. Stable disease. Assessment:     1. 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 Local recurrence in the para-aortic soft tissue - S/P SBRT     Recent CT shows progression of disease in the retroperitoneum, L4, lungs  The disease is unresectable. Treatment will in a palliative intent with a goal to extend life. Receiving palliative chemotherapy   Cis/[place]/Abraxane - s/p 6 cycles     Has been on hold since the end of May. He had another episode that required him to go to the ED and he finishes his last dose of levaquin today. Patient and wife mutually agreed to delay treatment x 2 weeks since he is competing antibiotics. Fluids today and next week with the hope of restarting treatment in 2 weeks. CT: Stable disease    .      2. Cancer related pain    S/P celiac plexus block - done by Dr. Juan Ambrocio with significant improvement in the pain  Following with Palliative medicine. Taking Oxycodone twice a day and dilaudid 1 every 6 hours      3. Bone metastasis, L4    Nuclear medicine bone scan  Ablation/Kyphoplasty - Dr. Juan Ambrocio  On Denosumab      4. Nausea, CINV - improved    Aloxi, emend, dexamethasone in [facility]  Olanzapine 10 mg po nightly x 4 starting day of chemotherapy  Zofran as needed       5. Diarrhea from malabsorption - improved    Using Lomotil and opium tincture      6. Chemotherapy related neuropathy    Stable  Grade I     7. Hypocalcemia    Calcium OTC    Plan:     > Hold treatment this cycle   > Fluids in the opic today and next week  > Continue to follow with palliative medicine  > Oral calcium OTC  > Hold xgeva today d/t hypocalcemia  > Follow-up in 2 weeks        Signed by: Ace Hernandez NP                     July 27, 2021      CC. Jean Colorado MD  CC. Elvira Cook MD  CC. Kelsi Lopez MD  CC. Liseth Kay MD  CC.  Augie Marino MD

## 2021-07-29 NOTE — PROGRESS NOTES
[facility] Progress Note    Pt admit to [facility] for hydration and Xgeva in stable condition. Assessment completed. Patient denied having any symptoms of COVID-19, i.e. SOB, coughing, fever, or generally not feeling well. Also denies having been exposed to COVID-19 recently or having had any recent contact with family/friend that has a pending COVID test.     R chest port accessed with 0.75\" Cara Ernesto w/o issue, with positive blood return. Labs drawn per order and sent. Line flushed, 1L NS hung to infuse over an hour. Pt denies recent or upcoming dental work. Patient Vitals for the past 12 hrs:   Temp Pulse Resp BP SpO2   07/29/21 1105  78 18 (!) 102/54    07/29/21 0953 97.9 °F (36.6 °C) 72 18 (!) 90/52 96 %        Recent Results (from the past 12 hour(s))   CBC WITH AUTOMATED DIFF    Collection Time: 07/29/21 10:02 AM   Result Value Ref Range    WBC 8.8 4.1 - 11.1 K/uL    RBC 3.53 (L) 4.10 - 5.70 M/uL    HGB 10.7 (L) 12.1 - 17.0 g/dL    HCT 34.5 (L) 36.6 - 50.3 %    MCV 97.7 80.0 - 99.0 FL    MCH 30.3 26.0 - 34.0 PG    MCHC 31.0 30.0 - 36.5 g/dL    RDW 13.1 11.5 - 14.5 %    PLATELET 83 (L) 123 - 400 K/uL    MPV 10.6 8.9 - 12.9 FL    NRBC 0.0 0  WBC    ABSOLUTE NRBC 0.00 0.00 - 0.01 K/uL    NEUTROPHILS 84 (H) 32 - 75 %    LYMPHOCYTES 6 (L) 12 - 49 %    MONOCYTES 5 5 - 13 %    EOSINOPHILS 4 0 - 7 %    BASOPHILS 0 0 - 1 %    IMMATURE GRANULOCYTES 1 (H) 0.0 - 0.5 %    ABS. NEUTROPHILS 7.4 1.8 - 8.0 K/UL    ABS. LYMPHOCYTES 0.5 (L) 0.8 - 3.5 K/UL    ABS. MONOCYTES 0.4 0.0 - 1.0 K/UL    ABS. EOSINOPHILS 0.4 0.0 - 0.4 K/UL    ABS. BASOPHILS 0.0 0.0 - 0.1 K/UL    ABS. IMM.  GRANS. 0.1 (H) 0.00 - 0.04 K/UL    DF SMEAR SCANNED      RBC COMMENTS NORMOCYTIC, NORMOCHROMIC     METABOLIC PANEL, COMPREHENSIVE    Collection Time: 07/29/21 10:02 AM   Result Value Ref Range    Sodium 138 136 - 145 mmol/L    Potassium 3.6 3.5 - 5.1 mmol/L    Chloride 106 97 - 108 mmol/L    CO2 26 21 - 32 mmol/L    Anion gap 6 5 - 15 mmol/L    Glucose 148 (H) 65 - 100 mg/dL    BUN 18 6 - 20 MG/DL    Creatinine 0.66 (L) 0.70 - 1.30 MG/DL    BUN/Creatinine ratio 27 (H) 12 - 20      GFR est AA >60 >60 ml/min/1.73m2    GFR est non-AA >60 >60 ml/min/1.73m2    Calcium 8.1 (L) 8.5 - 10.1 MG/DL    Bilirubin, total 0.9 0.2 - 1.0 MG/DL    ALT (SGPT) 126 (H) 12 - 78 U/L    AST (SGOT) 57 (H) 15 - 37 U/L    Alk. phosphatase 258 (H) 45 - 117 U/L    Protein, total 5.8 (L) 6.4 - 8.2 g/dL    Albumin 2.9 (L) 3.5 - 5.0 g/dL    Globulin 2.9 2.0 - 4.0 g/dL    A-G Ratio 1.0 (L) 1.1 - 2.2     MAGNESIUM    Collection Time: 07/29/21 10:02 AM   Result Value Ref Range    Magnesium 2.5 (H) 1.6 - 2.4 mg/dL   PHOSPHORUS    Collection Time: 07/29/21 10:02 AM   Result Value Ref Range    Phosphorus 2.7 2.6 - 4.7 MG/DL      Corrected calcium 8.9. Medications:  Medications Administered     0.9% sodium chloride injection 10 mL     Admin Date  07/29/2021 Action  Given Dose  30 mL Route  IntraVENous Administered By  Riya Sal          denosumab (XGEVA) injection 120 mg     Admin Date  07/29/2021 Action  Given Dose  120 mg Route  SubCUTAneous Administered By  Riya Sal          heparin (porcine) pf 300-500 Units     Admin Date  07/29/2021 Action  Given Dose  500 Units Route  InterCATHeter Administered By  Riya Sal          sodium chloride 0.9 % bolus infusion 1,000 mL     Admin Date  07/29/2021 Action  New Bag Dose  1,000 mL Rate  1,000 mL/hr Route  IntraVENous Administered By  Riya Sal                 Pt tolerated treatment well. Port maintained positive blood return throughout treatment, flushed with positive blood return at conclusion, and de-accessed. D/c home in no distress.  Pt aware of next [facility] appointment scheduled for:    Future Appointments   Date Time Provider Sloane Roach   8/2/2021 10:00 AM [facility] INJ RM 1 [facility] REG   8/2/2021  1:00 PM [facility] RM 2 [address] REG   8/2/2021  1:30 [facility] 8/3/2021 11:30 AM Rojelio Rogers MD [facility] BS AMB   8/5/2021  9:00 AM [place] [facility] [place] [address] REG   8/5/2021  9:15 AM Ed Morrison NP [facility] BS AMB   8/6/2021 11:30 AM Leti Johnson III, DO [facility] BS AMB   8/12/2021  9:00 AM [place] [facility] [address] REG   8/26/2021  9:00 AM [place] [address] REG   9/2/2021  9:00 AM [facility] REG

## 2021-08-02 NOTE — TELEPHONE ENCOUNTER
----- Message from Maroa. Jim Moscoso. sent at 8/1/2021  6:37 PM EDT -----  Regarding: Prescription Question  Contact: [phone]  Ton Luna,  I need a refill for the  opium tincture. It is really helping decrease the frequency of stools. I use [address] [place] [address] Pharmacy.   Thank you,  Venkat Osborne

## 2021-08-02 NOTE — TELEPHONE ENCOUNTER
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 -Appointment not scheduled prior to need for scheduled refill      Pharmacy: [facility] [address]    Medication:opium tincture 10 mg/mL (morphine) liquid      Dose and directions: Take 1 mL by mouth every six (6) hours as needed for Diarrhea  Number dispensed:118ml   Date filled ( or Pharmacy):  # left:         reviewed: _yes     Date of Urine Drug Screen:  _n/a    Opioid Safety Handout given:  _yes     Appropriate for refill:  _yes     Action:  _ please refill

## 2021-08-03 NOTE — PROGRESS NOTES
Palliative Medicine Outpatient Services  Jesus: [phone] ([phone])    Patient Name: Cami Pena. YOB: 1955    Date of Current Visit: 08/03/21  Location of Current Visit:    [] [facility] Office  [] [facility] Office  [] [address] [place] [address]  [] Home  [x] Other: Virtual synchronous A/V visit    Date of Initial Visit: 4/6/2020  Referral from: Dr. Kim Gruber  Primary Care Physician: Doug Henderson DO      SUMMARY:   Cami Jarvis is a 60y.o. 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 Kim Gruber for management of symptoms and psychosocial support. 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 This is second marriage for both of them. Current treatment-completed RT to the para-aortic mass, pursuing diagnostics with GI physician Dr. Tamy Valdovinos for gastroparesis and pancreatic enzymes, has had biliary stents replaced. Status post celiac plexus block and reversal of Whipple procedure on 9/9/2020    Hospitalization at [facility] for uncontrolled pain in December 2020    L5 biopsy, ablation and kyphoplasty on 1/12/2021    Current treatment-on cisplatin plus gemcitabine plus Abraxane, chemotherapy currently on hold  chemotherapy versus functional diarrhea     PALLIATIVE DIAGNOSES:       ICD-10-CM ICD-9-CM    1. Cholangiocarcinoma of biliary tract (HCC)  C22.1 155.1    2. Functional diarrhea  K59.1 564.5    3. Intractable low back pain  M54.5 724.2    4.  Pain of upper abdomen  R10.10 789.09 PLAN:   Patient Instructions     Dear Amelia Sheikh. ,    It was a pleasure seeing you today in your home     We will see you again in 2 months    If labs or imaging tests have been ordered for you today, please call the office  at [phone] 48 hours after completion to obtain the results. Your stated goal:   - better pain management    Your described symptoms were: Fatigue: 5 Drowsiness: 3   Depression: 0 Pain: 4   Anxiety: 0 Nausea: 1   Anorexia: 0 Dyspnea: 3   Best Well-Bein Constipation: No   Other Problem (Comment): 0       This is the plan we talked about:    1. Chronic low back pain from new L5 met status post ablation and kyphoplasty  -Your back pain is much better now, you are only on OxyContin 20 mg daily. You would like to wean off of this as well. You can stop taking OxyContin and take Dilaudid 2 mg daily for a week and then 2 mg every other day for a week and then stop. Please call us should you develop any flulike symptoms while you are weaning off of opioids.  - You are now off all opioids. 2.  Chemo induced diarrhea  -You are recently started on Creon but this has not made a big difference  -You are taking opium tincture every 6 hours and this is been very helpful, this is brought your diarrhea episodes from 12-15 a day to 5-6 a day. You take Lomotil and Imodium along with this. 3.  Sjogren's disease  -You have a tendency to get dehydrated very quickly. We talked about this in detail today including how to be proactive and drink more than a liter of water per day, have Gatorade and lemonade at home. Once you start chemotherapy, if you are not able to keep up with your oral fluid intake, please let us know so we can arrange for you to receive IV fluids in the infusion center or even at home if your insurance allows. We talked about the use of dispatch health which is more for urgent care needs only and not for nonurgent needs.   It is better to call us so we can arrange the care that you need proactively. 4.  Multivitamin deficiency  -You were found to have severe vitamin A and vitamin D deficiency. You are on replacement therapy now. Please discuss with your primary care doctor about rechecking the levels and continuing prescription level replacement. 5. We completed an AMD naming your wife as Nikko. 6.  Disease discussion  -We reviewed your scans and plan to hold chemotherapy for a month. You are very glad with this. You feel more tired and short of breath lately. You had scans done recently and will be discussing the results with Dr. Truman Asencio soon. 7.  Anorexia  -You feel weak. You have desire to eat and eating small frequent meals. Please start drinking Ensure and boost at least 2 bottles per day. 8.  I am glad you are received [facility] COVID-19 vaccine     This is what you have shared with us about Advance Care Planning:      Primary Decision Maker: Kade De Anda - [phone]    Secondary Decision Maker: Ridge Cristina III - Child - [phone]    Supplemental (Other) Decision Maker: Carlos Moore Child - [phone]  Advance Care Planning 8/3/2021   Patient's [address] is: Named in scanned ACP document   Primary Decision Maker Name -   Primary Decision Maker Phone Number -   Primary Decision Maker Relationship to Patient -   Confirm Advance Directive Yes, on file   Patient Would Like to Complete Advance Directive -   Does the patient have other document types -           The Palliative Medicine Team is here to support you and your family.        Sincerely,      Ivan Santana MD and the Palliative Medicine Team       GOALS OF CARE / TREATMENT PREFERENCES:   [====Goals of Care====]  GOALS OF CARE:  Patient / health care proxy stated goals: See Patient Instructions / Summary    TREATMENT PREFERENCES:   Code Status:  [x] Attempt Resuscitation       [] Do Not Attempt Resuscitation    Advance Care Planning:  [x] [facility] Med Interdisciplinary Team has updated the ACP Navigator with Decision Maker and Patient Capacity      Primary Decision Maker: Bela Curiel - [phone]    Secondary Decision Maker: Ridge Cristina III - Child - [phone]    Supplemental (Other) Decision Maker: Mariela De La O Child - [phone]  Advance Care Planning 8/3/2021   Patient's [address] is: Named in scanned ACP document   Primary Decision Maker Name -   Primary Decision Maker Phone Number -   Primary Decision Maker Relationship to Patient -   Confirm Advance Directive Yes, on file   Patient Would Like to Complete Advance Directive -   Does the patient have other document types -       Other:  (If patient appropriate for POST, consider using PALLPOST smart phrase here)    The palliative care team has discussed with patient / health care proxy about goals of care / treatment preferences for patient.  [====Goals of Care====]     PRESCRIPTIONS GIVEN:     No orders of the defined types were placed in this encounter.           FOLLOW UP:     Future Appointments   Date Time Provider Sloane Roach   8/5/2021  9:00 AM [facility]   8/5/2021  9:15 AM Iva Bear NP ON[facility]R BS AMB   8/6/2021 11:30 AM Obi Rodríguez DO [facility] BS AMB   8/12/2021  9:00 AM NIECY BHAKTA [place] [address] REG   8/26/2021  9:00 AM [place] [address] REG   9/2/2021  9:00 AM [address] [place] [address]           PHYSICIANS INVOLVED IN CARE:   Patient Care Team:  Obi Rodríguez DO as PCP - General (Internal Medicine)  Obi Rodríguez DO as PCP - [facility]  Carry Hammans, MD (General Surgery)  Arlene Mcdaniel MD (Gastroenterology)  Henok Dai MD (Gastroenterology)  Emiliana Rodas MD (Hematology and Oncology)  Arcadio Ashby MD as Palliative Care (Palliative Medicine)  Ginger Shepherd RN as Benefits Care Manager       HISTORY:   Reviewed patient-completed ESAS and advance care planning form. Reviewed patient record in prescription monitoring program.    CHIEF COMPLAINT: No chief complaint on file. HPI/SUBJECTIVE:    The patient is: [x] Verbal / [] Nonverbal     You are doing well pain wise, he is now off all opioids. He does have some leftover Dilaudid at home for emergencies but otherwise he is not on regular opioids. He only takes gabapentin and opium tincture for the diarrhea. He was doing really well off chemo but he notices now that he is more short of breath and tires easily. He has follow-up scans and will be seeing Dr. Anna Marie Owens soon. Diarrhea substantial without opium tincture and you would like to continue this for now. I will provide you with refills. -------    Patient here with his wife. Both are very good historians. Mid upper back pain-much improved since radiation to the periaortic mass. He struggled with pain for several months before it was identified as cancer recurrence. He was started on fentanyl 25 mcg patch which he wants to wean off of. He has made upper and mid zone abdominal pain which comes and goes. He has not noticed any specific pattern to the pain but usually the pain comes on before vomiting or relieves without vomiting. Sometimes, he vomits food that he ate about 12 to 14 hours ago. No constipation. He has 2 liquid bowel movements every day. He is unable to tolerate eggs or honey. He is getting tests done to evaluate his pancreatic enzymes. He has very good support through his wife.     Clinical Pain Assessment (nonverbal scale for nonverbal patients):   [++++ Clinical Pain Assessment++++]  [++++Pain Severity++++]: Pain: 4  [++++Pain Character++++]: cramping  [++++Pain Duration++++]: months  [++++Pain Effect++++]: functional   [++++Pain Factors++++]: none in particular  [++++Pain Frequency++++]: on and off  [++++Pain Location++++]: upper abdomen and mid back  [++++ Clinical Pain Assessment++++]       FUNCTIONAL ASSESSMENT: Palliative Performance Scale (PPS):          PSYCHOSOCIAL/SPIRITUAL SCREENING:     Any spiritual / Cheondoism concerns:  [] Yes /  [x] No    Caregiver Burnout:  [] Yes /  [x] No /  [] No Caregiver Present      Anticipatory grief assessment:   [x] Normal  / [] Maladaptive       ESAS Anxiety: Anxiety: 0    ESAS Depression: Depression: 0       REVIEW OF SYSTEMS:     The following systems were [x] reviewed / [] unable to be reviewed  Systems: constitutional, ears/nose/mouth/throat, respiratory, gastrointestinal, genitourinary, musculoskeletal, integumentary, neurologic, psychiatric, endocrine. Positive findings noted below. Modified ESAS Completed by: provider   Fatigue: 5 Drowsiness: 3   Depression: 0 Pain: 4   Anxiety: 0 Nausea: 1   Anorexia: 0 Dyspnea: 3   Best Well-Bein Constipation: No   Other Problem (Comment): 0          PHYSICAL EXAM:     Wt Readings from Last 3 Encounters:   21 158 lb 3.2 oz (71.8 kg)   21 154 lb 9.6 oz (70.1 kg)   21 154 lb (69.9 kg)     There were no vitals taken for this visit.   Last bowel movement: See Nursing Note    Constitutional    [x] Appears well-developed and well-nourished in no apparent distress    [] Abnormal:  Mental status  [x] Alert and awake  [x] Oriented to person/place/time  [x] Able to follow commands  [] Abnormal:   Eyes  [x] EOM normal   [x] Sclera normal   [x] No visible ocular discharge  [] Abnormal:   HENT  [x] Normocephalic, atraumatic  [x] Mouth/Throat: Moist mucous membranes   [x] External Ears normal  [] Abnormal:  Neck  [x] No visualized mass  [] Abnormal:  Pulmonary/Chest   [x] Respiratory effort normal  [x] No visualized signs of difficulty breathing or respiratory distress  [] Abnormal:  Musculoskeletal  [x] Normal gait with no signs of ataxia  [x] Normal range of motion of neck  [] Abnormal:  Neurological:   [x] No facial asymmetry (Cranial nerve 7 motor function)  [x] No gaze palsy  [] Abnormal:   Skin  [x] No significant exanthematous lesions or discoloration noted on facial skin  [] Abnormal:                                  Psychiatric  [x] Normal affect  [x] No hallucinations  [] Abnormal:    Other pertinent observable physical exam findings:    Due to this being a TeleHealth evaluation, many elements of the physical examination are unable to be assessed. HISTORY:     Past Medical History:   Diagnosis Date    Aneurysm ([facility] 2008    CEREBRAL    Arrhythmia     Previous a.fib, ablation, NSR now; NO LONGER FOLLOWED BY CARDIOLOGIST.     Autoimmune disease ([facility]     SJOGREN'S    Cancer ([facility] 2020    COMMON BILE DUCT, ADENOCARCINOMA, SPINE    Diabetes ([facility]     NIDDM    Family history of skin cancer     Hypertension     Sepsis ([facility] 2017    STENTING TO BILE DUCT    Status post chemotherapy     Stroke [facility]) 2008    brain aneurysm - no deficits    Sun-damaged skin     Sunburn, blistering       Past Surgical History:   Procedure Laterality Date    HX CHOLECYSTECTOMY      HX GI      COLONOSCOPY, POLYPS (BENIGN)    H[facility] GI  10/06/2017    [facility]     HX GI  2020    WHIPPLE REVISION    HX HEART CATHETERIZATION  2005    Ablation     HX HERNIA REPAIR  12/2020    HERNIA REPAIR    HX ORTHOPAEDIC  2000    Spinal fusion W/ HARDWARE    HX OTHER SURGICAL  11/07/2017    [place] [facility], Dr. Swati Thacker HX OTHER SURGICAL  01/11/2021    RIGHT IJ PORT-A-CATH PLACEMENT     HX VASCULAR ACCESS  2017    PORTACATH - then removed    IR KYPHOPLASTY LUMBAR  1/12/2021    NEUROLOGICAL PROCEDURE UNLISTED  2005    Yvrose Letters hole washout from cerebral hemorrhage    AL ABDOMEN SURGERY PROC UNLISTED  10/2017    APRIL      Family History   Problem Relation Age of Onset    Cancer Father         Breast and Colon, MELANOMA    Hypertension Father     Cancer Mother         LEUKEMIA    No Known Problems Sister     Atrial Fibrillation Brother     No Known Problems Sister     No Known Problems Son     No Known Problems G[place]imo    Kokialyssa Anthony Anesth Problems Neg Hx       History reviewed, no pertinent family history. Social History     Tobacco Use    Smoking status: Never Smoker    Smokeless tobacco: Never Used   Substance Use Topics    Alcohol use: Never     Comment: very rare     Allergies   Allergen Reactions    Shellfish Derived Anaphylaxis     Soft shell crabs    Morphine Nausea and Vomiting    Pcn [Penicillins] Other (comments)     Pt stated \"always been told PCN\"  Pt tolerated other cephalosporins in the past      Current Outpatient Medications   Medication Sig    opium tincture 10 mg/mL (morphine) liquid Take 1 mL by mouth every six (6) hours as needed for Diarrhea for up to 30 days. Max Daily Amount: 4 mL.  azelastine (ASTEPRO) 205.5 mcg (0.15 %) 2 Sprays by Both Nostrils route two (2) times a day.  vitamin E, dl,tocopheryl acet, (vitamin E acetate) 400 unit capsule TAKE 1 CAPSULE BY MOUTH ONE TIME A DAY    vitamin A (AQUASOL A) 10,000 unit capsule TAKE 1 CAPSULE BY MOUTH ONE TIME A DAY    diphenoxylate-atropine (LomotiL) 2.5-0.025 mg per tablet Take 1 Tab by mouth three (3) times daily as needed for Diarrhea. Max Daily Amount: 3 Tabs.  OLANZapine (ZyPREXA) 10 mg tablet Take 1 Tab by mouth See Admin Instructions. Take nightly for 4 days starting the evening of chemotherapy.  pantoprazole (PROTONIX) 40 mg tablet TAKE 1 TABLET BY MOUTH EVERY DAY    lidocaine-prilocaine (EMLA) topical cream Apply  to affected area as needed for Pain. 30-45 min prior to treatments    ondansetron (ZOFRAN ODT) 4 mg disintegrating tablet Take 1 Tab by mouth every eight (8) hours as needed for Nausea or Vomiting.  dexAMETHasone (DECADRON) 1 mg tablet Take 2 tablets by mouth twice daily for 14 days    dutasteride (AVODART) 0.5 mg capsule Take 1 Cap by mouth daily.  Creon 36,000-114,000- 180,000 unit cpDR capsule Take 4 Caps by mouth three (3) times daily (with meals).  2-3 caps each meal and 1 cap for snacks (see additional order)    naloxone (NARCAN) 4 mg/actuation nasal spray Use 1 spray intranasally, then discard. Repeat with new spray every 2 min as needed for opioid overdose symptoms, alternating nostrils.  aluminum & magnesium hydroxide-simethicone (Maalox Maximum Strength) 400-400-40 mg/5 mL suspension Take 10 mL by mouth every six (6) hours as needed for Indigestion.  vitamin E (AQUA GEMS) 400 unit capsule Take 1 Cap by mouth daily.  empagliflozin (Jardiance) 25 mg tablet Take 1 Tab by mouth nightly.  gabapentin (NEURONTIN) 100 mg capsule Take 3 capsules by mouth twice a day.  cyanocobalamin (VITAMIN B12) 1,000 mcg/mL injection INJECT CONTENTS OF ONE VIAL INTRAMUSCULARLY EVERY OTHER WEEK (Patient taking differently: INJECT CONTENTS OF ONE VIAL INTRAMUSCULARLY EVERY OTHER WEEK (FIRST AND 15TH OF EACH MONTH))    tamsulosin (FLOMAX) 0.4 mg capsule TAKE ONE CAPSULE BY MOUTH EVERY EVENING    ramipriL (ALTACE) 5 mg capsule Take 1 Cap by mouth daily. (Patient taking differently: Take 5 mg by mouth nightly.)    loperamide (IMODIUM) 2 mg capsule Take 2-4 mg by mouth as needed for Diarrhea. Give 4 mg after first loose stool. Give an additional 2 mg after each loose stool as needed up to a maximum of 8 doses (16 mg/day).  lipase-protease-amylase (Creon) 36,000-114,000- 180,000 unit cpDR capsule Take 1 Cap by mouth as needed (for snacks). 2-3 caps each meal and 1 cap for snacks (see additional order)    finasteride (PROSCAR) 5 mg tablet TAKE 1 TABLET BY MOUTH EVERY DAY    acetaminophen (TYLENOL) 325 mg tablet Take 2 Tabs by mouth every four (4) hours as needed for Pain or Fever (Over the counter medication).  sildenafil citrate (VIAGRA) 50 mg tablet Take 1 Tab by mouth as needed (ED).  alpha-D-galactosidase (BEANO PO) Take 1 Tab by mouth two (2) times a day.  cetirizine (ZYRTEC) 10 mg tablet Take 10 mg by mouth nightly.  lidocaine (LIDODERM) 5 % 1 Patch by TransDERmal route every twenty-four (24) hours.  Apply patch to the affected area for 12 hours a day and remove for 12 hours a day. (Patient not taking: Reported on 8/3/2021)     No current facility-administered medications for this visit. LAB DATA REVIEWED:     Lab Results   Component Value Date/Time    WBC 8.8 07/29/2021 10:02 AM    HGB 10.7 (L) 07/29/2021 10:02 AM    PLATELET 83 (L) 18/49/7172 10:02 AM     Lab Results   Component Value Date/Time    Sodium 138 07/29/2021 10:02 AM    Potassium 3.6 07/29/2021 10:02 AM    Chloride 106 07/29/2021 10:02 AM    CO2 26 07/29/2021 10:02 AM    BUN 18 07/29/2021 10:02 AM    Creatinine 0.66 (L) 07/29/2021 10:02 AM    Calcium 8.1 (L) 07/29/2021 10:02 AM    Magnesium 2.5 (H) 07/29/2021 10:02 AM    Phosphorus 2.7 07/29/2021 10:02 AM      Lab Results   Component Value Date/Time    Alk. phosphatase 258 (H) 07/29/2021 10:02 AM    Protein, total 5.8 (L) 07/29/2021 10:02 AM    Albumin 2.9 (L) 07/29/2021 10:02 AM    Globulin 2.9 07/29/2021 10:02 AM     Lab Results   Component Value Date/Time    INR 1.1 09/09/2020 02:15 AM    Prothrombin time 11.5 (H) 09/09/2020 02:15 AM    aPTT 29.9 09/09/2020 02:15 AM      Lab Results   Component Value Date/Time    Iron 31 (L) 01/02/2020 03:24 AM    TIBC 245 (L) 01/02/2020 03:24 AM    Iron % saturation 13 (L) 01/02/2020 03:24 AM    Ferritin 122 01/02/2020 03:24 AM      Chest CT- aug 2021       IMPRESSION  1. Multiple pulmonary nodules some of which have increased in size which may  represent progression of disease. Several new nodules are noted.     2. Nonspecific foci of enhancement in the right lobe the liver. These may  represent focal areas of inflammation or hyperemia given the  hepaticojejunostomy. Attention on follow-up imaging     3. Status post Whipple procedure.     4.  Stable periaortic soft tissue density.     5.  Stable perinephric stranding and fluid around the left kidney hilum       CT chest, abdomen and pelvis-May 2021  IMPRESSION     1.  Unchanged appearance of the chest, abdomen, and pelvis when compared to  3/1/2021. No acute findings or evidence of disease progression. 2. Unchanged small pulmonary nodules. Unchanged retroperitoneal soft tissue mass  with left renal vein occlusion and collateral retroperitoneal venous structures.       CT abd 3/1/21  IMPRESSION  1. No significant change in the appearance of pulmonary nodules. 2. No significant change in the appearance of retroperitoneal adenopathy/mass. There is mass effect upon the vasculature, including occlusion of the left renal  vein with collateral vessels in the retroperitoneum, likely unchanged. 3. Incidental findings as above. CT chest 12/29/2020  IMPRESSION:     1. Numerous tiny pulmonary parenchymal nodules right greater than left,  suspicious for metastatic disease. 2. Focal ill-defined opacity in the right upper lobe which may represent  infiltrate. Follow-up recommended, however. 3. Incidental findings in the upper abdomen described earlier same day.      CONTROLLED SUBSTANCES SAFETY ASSESSMENT (IF ON CONTROLLED SUBSTANCES):     Reviewed opioid safety handout:  [x] Yes   [] No  24 hour opioid dose >150mg morphine equivalent/day:  [] Yes   [x] No  Benzodiazepines:  [] Yes   [x] No  Sleep apnea:  [] Yes   [x] No  Urine Toxicology Testing within last 6 months:  [] Yes   [x] No  History of or new aberrant medication taking behaviors:  [] Yes   [x] No  Has Narcan been prescribed [x] Yes   [] No          Total time: 45 minutes   Counseling / coordination time: 40  > 50% counseling / coordination?:   Consent:  He and/or health care decision maker is aware that that he may receive a bill for this telehealth service, depending on his insurance coverage, and has provided verbal consent to proceed: Yes    CPT Codes 04560-80236 for Established Patients may apply to this Telehealth Visit  Pursuant to the emergency declaration under the [facility] waiver authority and the Coronavirus Preparedness and Response Supplemental Appropriations Act, this Virtual  Visit was conducted, with patient's consent, to reduce the patient's risk of exposure to COVID-19 and provide continuity of care for an established patient. Services were provided through a video synchronous discussion virtually to substitute for in-person clinic visit.

## 2021-08-03 NOTE — PROGRESS NOTES
Alex Francis. is a 60 y.o. male here for follow up for cholangiocarcinoma. Treatment today:  Cycle 7 Day 1 Nab-Paclitaxel + Gemcitabine + Cisplatin  Here to review scans done 8/2/21. 1. Have you been to the ER, urgent care clinic since your last visit? Hospitalized since your last visit? no    2. Have you seen or consulted any other health care providers outside of the [address] [place] [address] since your last visit? Include any pap smears or colon screening.   no

## 2021-08-03 NOTE — PROGRESS NOTES
Palliative Medicine Office Visit  Palliative Medicine Nurse Check In  [phone]    Patient Name: Tess Lawson. YOB: 1955      Date of Office Visit: 8/3/2021    Patient states: \"  \"    From Check In Sheet (scanned in Media):  Has a medical provider talked with you about cardiopulmonary resuscitation (CPR)? [x] Yes   [] No   [] Unable to obtain    Nurse reminder to complete or update ACP FlowSheet:    Is ACP on the Problem List?    [x] Yes    [] No  IF ACP Document is ON FILE; Nurse to place ACP on Problem List     Is there an ACP Note in Chart Review/Note? [x] Yes    [] No   If NO: ALERT PROVIDER       Primary Decision MakerCoy Pay - [phone]    Secondary Decision Maker: Ridge Cristina III - Child - [phone]    Supplemental (Other) Decision Maker: Susanne Joni Child - [phone]  Advance Care Planning 8/3/2021   Patient's [address] is: Named in scanned ACP document   Primary Decision Maker Name -   Primary Decision Maker Phone Number -   Primary Decision Maker Relationship to Patient -   Confirm Advance Directive Yes, on file   Patient Would Like to Complete Advance Directive -   Does the patient have other document types -         Is there anything that we should know about you as a person in order to provide you the best care possible? Have you been to the ER, urgent care clinic since your last visit? [x] Yes   [] No   [] Unable to obtain    Have you been hospitalized since your last visit? [] Yes   [x] No   [] Unable to obtain    Have you seen or consulted any other health care providers outside of the [address] [place] [address] since your last visit?    [] Yes   [x] No   [] Unable to obtain    Functional status (describe):       Last BM: 8/3/2021     accessed (date): 8/3/2021    Bottle review (for opioid pain medication):  Medication 1:   Date filled:   Directions:   # filled:   # left:   # pills taking per day:  Last dose taken:     Medication 2:   Date filled:   Directions:   # filled:   # left:   # pills taking per day:  Last dose taken:    Medication 3:   Date filled:   Directions:   # filled:   # left:   # pills taking per day:  Last dose taken:    Medication 4:   Date filled:   Directions:   # filled:   # left:   # pills taking per day:  Last dose taken:

## 2021-08-03 NOTE — PATIENT INSTRUCTIONS
Dear Yulia Graf. ,    It was a pleasure seeing you today in your home     We will see you again in 2 months    If labs or imaging tests have been ordered for you today, please call the office  at [phone] 48 hours after completion to obtain the results. Your stated goal:   - better pain management    Your described symptoms were: Fatigue: 5 Drowsiness: 3   Depression: 0 Pain: 4   Anxiety: 0 Nausea: 1   Anorexia: 0 Dyspnea: 3   Best Well-Bein Constipation: No   Other Problem (Comment): 0       This is the plan we talked about:    1. Chronic low back pain from new L5 met status post ablation and kyphoplasty  -Your back pain is much better now, you are only on OxyContin 20 mg daily. You would like to wean off of this as well. You can stop taking OxyContin and take Dilaudid 2 mg daily for a week and then 2 mg every other day for a week and then stop. Please call us should you develop any flulike symptoms while you are weaning off of opioids.  - You are now off all opioids. 2.  Chemo induced diarrhea  -You are recently started on Creon but this has not made a big difference  -You are taking opium tincture every 6 hours and this is been very helpful, this is brought your diarrhea episodes from 12-15 a day to 5-6 a day. You take Lomotil and Imodium along with this. 3.  Sjogren's disease  -You have a tendency to get dehydrated very quickly. We talked about this in detail today including how to be proactive and drink more than a liter of water per day, have Gatorade and lemonade at home. Once you start chemotherapy, if you are not able to keep up with your oral fluid intake, please let us know so we can arrange for you to receive IV fluids in the infusion center or even at home if your insurance allows. We talked about the use of dispatch health which is more for urgent care needs only and not for nonurgent needs.   It is better to call us so we can arrange the care that you need proactively. 4.  Multivitamin deficiency  -You were found to have severe vitamin A and vitamin D deficiency. You are on replacement therapy now. Please discuss with your primary care doctor about rechecking the levels and continuing prescription level replacement. 5. We completed an AMD naming your wife as Nikko. 6.  Disease discussion  -We reviewed your scans and plan to hold chemotherapy for a month. You are very glad with this. You feel more tired and short of breath lately. You had scans done recently and will be discussing the results with Dr. Jabier Reinoso soon. 7.  Anorexia  -You feel weak. You have desire to eat and eating small frequent meals. Please start drinking Ensure and boost at least 2 bottles per day. 8.  I am glad you are received Laurie Alva COVID-19 vaccine     This is what you have shared with us about Advance Care Planning:      Primary Decision Maker: Ezekiel Ozuna - [phone]    Secondary Decision Maker: Ridge Cristina III - Child - [phone]    Supplemental (Other) Decision Maker: Davian Almonte Child - [phone]  Advance Care Planning 8/3/2021   Patient's [address] is: Named in scanned ACP document   Primary Decision Maker Name -   Primary Decision Maker Phone Number -   Primary Decision Maker Relationship to Patient -   Confirm Advance Directive Yes, on file   Patient Would Like to Complete Advance Directive -   Does the patient have other document types -           The Palliative Medicine Team is here to support you and your family.        Sincerely,      Arsalan Taylor MD and the Palliative Medicine Team

## 2021-08-05 NOTE — PROGRESS NOTES
Oncology Social Work  Psychosocial Assessment    Reason for Assessment:      [] Social Work Referral [x] Initial Assessment [] New Diagnosis [] Other    Advance Care Plann[address] 2021   Patient's [address] is: Named in scanned ACP document   Primary Decision Maker Name -   Primary Decision Maker Phone Number -   Primary Decision Maker Relationship to Patient -   Confirm Advance Directive Yes, on file   Patient Would Like to Complete Advance Directive -   Does the patient have other document types -       Sources of Information:    [x]Patient  [x]Family  [x]Staff  [x]Medical Record    Mental Status:    [x]Alert  []Lethargic  []Unresponsive   [] Unable to assess   Oriented to:  [x]Person  [x]Place  [x]Time  [x]Situation      Relationship Status:  []Single  [x]  []Significant Other/Life Partner  []  []  []    Living Circumstances:  []Lives Alone  [x]Family/Significant Other in Household  []Roommates  []Children in the Home  []Paid Caregivers  []Assisted Living Facility/Group [address]  []Homeless  []Incarcerated  []Environmental/Care Concerns  []Other:    Employment Status:  [x]Employed Full-time []Employed Part-time []Homemaker  [] Retired [] Short-Term Disability [] [place]  [] Unemployed     Barriers to Learning:    []Language  []Developmental  []Cognitive  []Altered Mental Status  []Visual/Hearing Impairment  []Unable to Read/Write  []Motivational  [] Challenges Understanding Medical Jargon [x]No Barriers Identified      Financial/Legal Concerns:    []Uninsured  []Limited Income/Resources  []Non-Citizen  []Food Insecurity [x]No Concerns Identified   []Other:    Muslim/Spiritual/Existential:  Does patient have any spiritual or Baptism beliefs? [] Yes [] No  Is the patient involved in a spiritual, lima or Baptism community?  [] Yes [] No  Patient expressing spiritual/existential angst? [] Yes [x] No  Notes:    Support System:    Identified Support Person/Group:  [x]Strong  []Fair  []Limited    Coping with Illness:   [x]  Coping Well  [] Challenges Coping with Serious Illness [] Situational Depression [] Situational Anxiety [] Anticipatory Grief  [] Recent Loss [] Caregiver [place]            Narrative:     Met with patient  and his wife of 25 years, Christelle/nurse/instructor at [facility], to introduce social work navigator role and supports. Pt is a rodriguez and he has 2 sons and wife has 3 sons. 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 MD discussed NIH trials but pt most likely does not meet tumor size criteria. Plan:   1. Introduce self and role of the  in the [address] [place] [address] St. 2. Informed the patient of the [facility] and available resources there. 3. Continue to meet with the patient when he returns to the clinic for ongoing assessment of the patient's adjustment to his diagnosis and treatment. 4. Ongoing psychosocial support as desired by patient. Referral:       Complementary therapies referral    Callie Richard.  DEBI Valle/LOUIE  Supervisee in Social Work

## 2021-08-05 NOTE — PROGRESS NOTES
[address] [facility] [address], [address] [facility], [address] [place] [address], 200 [facility]  [phone]    Follow-up Note      Patient: Sergio Collado MRN: [id number]  SSN: [id number]    YOB: 1955  Age: 60 y.o. Sex: male        Diagnosis:     1. 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 S/P Pancreatoduodenectomy on  10/06/2017  2. Adjuvant monotherapy with Capecitabine - s/p 6 cycles   (12/4/2017 - 05/2018)  3. SBRT RP node/soft tissue 04/2020  4. Palliative chemotherapy   Cis/[place]/Abraxane - s/p 6 cycles - discontinued d/t progression of disease    Subjective:      Sergio Collado is a 60 y.o. male with a diagnosis of extrahepatic cholangiocarcinoma with metastatic to the lungs, bones and retroperitoneal node/soft tissue. He presented to the ED in August 2017 with obstructive jaundice. He was found to have an obstructive lesion in the dital bile duct. The CT showed marked intrahepatic biliary dilation and common bile duct dilation to the level of the mid common bile duct, which ws markedly narrowed. He underwent a stenting procedure followed by spyglass cholangiogram. The brushings revealed a diagnosis of cholangiocarcinoma. He underwent a Whipple procedure on 10/06/2017. He completed 6 cycles of adjuvant Xeloda. PET scan showed increased activity in the soft tissue along the SMA. CT guided biopsy showed local recurrence. He underwent SBRT by Dr. Carmelina Zuñiga in April 2020. He was admitted with severe back pain. Repeat CT shows growth of tumor in the L4/L5, lung and RP node/soft tissue. He underwent a CT guided celiac plexus block which has helped the back pain immensely. A biopsy of the L4 vertebrae confirms a diagnosis of metastatic cholangiocarcinoma.       Jonnie received palliative chemotherapy. He has been off of treatment since the end of May. Once a month he has a feeling of cold, nausea, vomiting and illness where he lands in the hospital. He has had multiple ER visits requiring fluids. He is here today to discuss the most recent scans. He is feeling well. Review of Systems:    Constitutional: fatigue  Eyes: negative  Ears, Nose, Mouth, Throat, and Face: negative  Respiratory: negative  Cardiovascular: negative  Gastrointestinal: nausea, diarrhea (controlled with tincture of opium)  Genitourinary:negative  Integument/Breast: negative  Hematologic/Lymphatic: negative  Musculoskeletal: B/L ankle edema  Neurological: numbness/tingling B/L feet    ROS was pulled in from a previous visit. No changes were made d/t symptoms remain the same. Past Medical History:   Diagnosis Date    Aneurysm [facility]) 2008    CEREBRAL    Arrhythmia     Previous a.fib, ablation, NSR now; NO LONGER FOLLOWED BY CARDIOLOGIST.     Autoimmune disease ([facility] [address]     SJOGREN'S    Cancer ([facility] [address] 2020    COMMON BILE DUCT, ADENOCARCINOMA, SPINE    Diabetes ([facility] [address]     NIDDM    Family history of skin cancer     Hypertension     Sepsis ([facility] [address] 2017    STENTING TO BILE DUCT    Status post chemotherapy     Stroke [facility]) 2008    brain aneurysm - no deficits    Sun-damaged skin     Sunburn, blistering      Past Surgical History:   Procedure Laterality Date    HX CHOLECYSTECTOMY      HX GI      COLONOSCOPY, POLYPS (BENIGN)    HX GI  10/06/2017    Whipple [facility]     HX GI  2020    WHIPPLE REVISION    HX HEART CATHETERIZATION  2005    Ablation     HX HERNIA REPAIR  12/2020    HERNIA REPAIR    HX ORTHOPAEDIC  2000    Spinal fusion W/ HARDWARE    HX OTHER SURGICAL  11/07/2017    [place] Cath, Dr. Carol Choe HX OTHER SURGICAL  01/11/2021    RIGHT IJ PORT-A-CATH PLACEMENT     HX VASCULAR ACCESS  2017    PORTACATH - then removed    IR KYPHOPLASTY LUMBAR  1/12/2021    NEUROLOGICAL PROCEDURE UNLISTED  2005    [place] hole washout from cerebral hemorrhage    MO ABDOMEN SURGERY PROC UNLISTED  10/2017    APRIL      Family History   Problem Relation Age of Onset    Cancer Father         Breast and Colon, MELANOMA    Hypertension Father     Cancer Mother         LEUKEMIA    No Known Problems Sister     Atrial Fibrillation Brother     No Known Problems Sister     No Known Problems Son     No Known Problems Son     Anesth Problems Neg Hx      Social History     Tobacco Use    Smoking status: Never Smoker    Smokeless tobacco: Never Used   Substance Use Topics    Alcohol use: Never     Comment: very rare      Prior to Admission medications    Medication Sig Start Date End Date Taking? Authorizing Provider   opium tincture 10 mg/mL (morphine) liquid Take 1 mL by mouth every six (6) hours as needed for Diarrhea for up to 30 days. Max Daily Amount: 4 mL. 8/2/21 9/1/21 Yes Daniele Andrew MD   azelastine (ASTEPRO) 205.5 mcg (0.15 %) 2 Sprays by Both Nostrils route two (2) times a day. 7/16/21  Yes Pro Broussard MD   vitamin E, dl,tocopheryl acet, (vitamin E acetate) 400 unit capsule TAKE 1 CAPSULE BY MOUTH ONE TIME A DAY 6/16/21  Yes Silas Raya III, DO   vitamin A (AQUASOL A) 10,000 unit capsule TAKE 1 CAPSULE BY MOUTH ONE TIME A DAY 6/16/21  Yes Silas Raya III, DO   diphenoxylate-atropine (LomotiL) 2.5-0.025 mg per tablet Take 1 Tab by mouth three (3) times daily as needed for Diarrhea. Max Daily Amount: 3 Tabs. 4/27/21  Yes Negrita Brasher MD   OLANZapine (ZyPREXA) 10 mg tablet Take 1 Tab by mouth See Admin Instructions. Take nightly for 4 days starting the evening of chemotherapy. 1/29/21  Yes Kanika Marino MD   pantoprazole (PROTONIX) 40 mg tablet TAKE 1 TABLET BY MOUTH EVERY DAY 1/11/21  Yes Daniele Andrew MD   lidocaine-prilocaine (EMLA) topical cream Apply  to affected area as needed for Pain.  30-45 min prior to treatments 1/7/21  Yes Jaron Cosme Krishna MD   ondansetron (ZOFRAN ODT) 4 mg disintegrating tablet Take 1 Tab by mouth every eight (8) hours as needed for Nausea or Vomiting. 1/7/21  Yes Sari Zapata MD   dexAMETHasone (DECADRON) 1 mg tablet Take 2 tablets by mouth twice daily for 14 days 12/31/20  Yes Rishi Bullard MD   dutasteride (AVODART) 0.5 mg capsule Take 1 Cap by mouth daily. 12/21/20  Yes Nola Leyden, DO   Creon 36,000-114,000- 180,000 unit cpDR capsule Take 4 Caps by mouth three (3) times daily (with meals). 2-3 caps each meal and 1 cap for snacks (see additional order) 12/21/20  Yes Silas Raya III, DO   naloxone (NARCAN) 4 mg/actuation nasal spray Use 1 spray intranasally, then discard. Repeat with new spray every 2 min as needed for opioid overdose symptoms, alternating nostrils. 12/11/20  Yes Nadeem Abarca MD   aluminum & magnesium hydroxide-simethicone (Maalox Maximum Strength) 400-400-40 mg/5 mL suspension Take 10 mL by mouth every six (6) hours as needed for Indigestion. Yes Provider, Historical   vitamin E (AQUA GEMS) 400 unit capsule Take 1 Cap by mouth daily. 10/26/20  Yes Silas Raya III, DO   empagliflozin (Jardiance) 25 mg tablet Take 1 Tab by mouth nightly. 10/20/20  Yes Silas Raya III, DO   gabapentin (NEURONTIN) 100 mg capsule Take 3 capsules by mouth twice a day. 10/20/20  Yes Silas Raya III, DO   cyanocobalamin (VITAMIN B12) 1,000 mcg/mL injection INJECT CONTENTS OF ONE VIAL INTRAMUSCULARLY EVERY OTHER WEEK  Patient taking differently: INJECT CONTENTS OF ONE VIAL INTRAMUSCULARLY EVERY OTHER WEEK (FIRST AND 15TH OF EACH MONTH) 10/20/20  Yes Silas Raya III, DO   tamsulosin (FLOMAX) 0.4 mg capsule TAKE ONE CAPSULE BY MOUTH EVERY EVENING 10/20/20  Yes Silas Raya III, DO   ramipriL (ALTACE) 5 mg capsule Take 1 Cap by mouth daily. Patient taking differently: Take 5 mg by mouth nightly.  10/20/20  Yes Nola Leyden, DO loperamide (IMODIUM) 2 mg capsule Take 2-4 mg by mouth as needed for Diarrhea. Give 4 mg after first loose stool. Give an additional 2 mg after each loose stool as needed up to a maximum of 8 doses (16 mg/day). Yes Provider, Historical   lipase-protease-amylase (Creon) 36,000-114,000- 180,000 unit cpDR capsule Take 1 Cap by mouth as needed (for snacks). 2-3 caps each meal and 1 cap for snacks (see additional order)   Yes Provider, Historical   finasteride (PROSCAR) 5 mg tablet TAKE 1 TABLET BY MOUTH EVERY DAY 4/13/20  Yes Provider, Historical   acetaminophen (TYLENOL) 325 mg tablet Take 2 Tabs by mouth every four (4) hours as needed for Pain or Fever (Over the counter medication). 1/2/20  Yes Mikey Cook NP   sildenafil citrate (VIAGRA) 50 mg tablet Take 1 Tab by mouth as needed (ED). 5/6/19  Yes Silas Raya III, DO   alpha-D-galactosidase (BEANO PO) Take 1 Tab by mouth two (2) times a day. Yes Other, MD Flynn   cetirizine (ZYRTEC) 10 mg tablet Take 10 mg by mouth nightly. Yes Provider, Historical   lidocaine (LIDODERM) 5 % 1 Patch by TransDERmal route every twenty-four (24) hours. Apply patch to the affected area for 12 hours a day and remove for 12 hours a day.   Patient not taking: Reported on 8/3/2021 10/15/20   Krzysztof OGDEN III, DO          Allergies   Allergen Reactions    Shellfish Derived Anaphylaxis     Soft shell crabs    Morphine Nausea and Vomiting    Pcn [Penicillins] Other (comments)     Pt stated \"always been told PCN\"  Pt tolerated other cephalosporins in the past           Objective:     Visit Vitals  BP 98/61   Pulse 72   Temp 98.1 °F (36.7 °C)   Resp 16   Ht 5' 8\" (1.727 m)   Wt 159 lb (72.1 kg)   SpO2 100%   BMI 24.18 kg/m²     Pain Scale: 0 - No pain/10      Physical Exam:     GENERAL: alert, cooperative, no distress, appears stated age  EYE: negative  LYMPHATIC: Cervical, supraclavicular, and axillary nodes normal.   THROAT & NECK: normal and no erythema or exudates noted. LUNG: clear to auscultation bilaterally  HEART: regular rate and rhythm  ABDOMEN: soft, non-tender  EXTREMITIES: trace ankle edema  SKIN: Scabs on top of head  NEUROLOGIC: Numbness in fingertips and feet    Physical exam was pulled in from a previous visit. No changes were made d/t physical exam remains the same. Lab Results   Component Value Date/Time    WBC 6.0 08/05/2021 09:02 AM    Hemoglobin (POC) 10.3 (L) 10/06/2017 01:14 PM    HGB 11.7 (L) 08/05/2021 09:02 AM    Hematocrit (POC) 28 (L) 05/13/2021 09:16 AM    HCT 37.3 08/05/2021 09:02 AM    PLATELET 082 (L) 31/91/9233 09:02 AM    MCV 96.9 08/05/2021 09:02 AM       Lab Results   Component Value Date/Time    Sodium 138 07/29/2021 10:02 AM    Potassium 3.6 07/29/2021 10:02 AM    Chloride 106 07/29/2021 10:02 AM    CO2 26 07/29/2021 10:02 AM    Anion gap 6 07/29/2021 10:02 AM    Glucose 148 (H) 07/29/2021 10:02 AM    BUN 18 07/29/2021 10:02 AM    Creatinine 0.66 (L) 07/29/2021 10:02 AM    BUN/Creatinine ratio 27 (H) 07/29/2021 10:02 AM    GFR est AA >60 07/29/2021 10:02 AM    GFR est non-AA >60 07/29/2021 10:02 AM    Calcium 8.1 (L) 07/29/2021 10:02 AM    Bilirubin, total 0.9 07/29/2021 10:02 AM    Alk. phosphatase 258 (H) 07/29/2021 10:02 AM    Protein, total 5.8 (L) 07/29/2021 10:02 AM    Albumin 2.9 (L) 07/29/2021 10:02 AM    Globulin 2.9 07/29/2021 10:02 AM    A-G Ratio 1.0 (L) 07/29/2021 10:02 AM    ALT (SGPT) 126 (H) 07/29/2021 10:02 AM    AST (SGOT) 57 (H) 07/29/2021 10:02 AM       CT Results (most recent):  Results from Hospital Encounter encounter on 08/02/21    CT ABD PELV W CONT    Narrative  EXAM: CT CHEST W CONT, CT ABD PELV W CONT    INDICATION: Cholangiocarcinoma with bone metastases    COMPARISON: 5/20/2021    CONTRAST: 100 mL of Isovue-370. TECHNIQUE:  Following the uneventful intravenous administration of contrast, thin axial  images were obtained through the chest, abdomen and pelvis.  Coronal and sagittal  reconstructions were generated. Oral contrast was administered. CT dose  reduction was achieved through use of a standardized protocol tailored for this  examination and automatic exposure control for dose modulation. FINDINGS:    CHEST WALL:No axillary supra clavicular adenopathy. Right chest wall  Port-A-Cath. THYROID: No nodule. MEDIASTINUM: No mass or lymphadenopathy. ANGEL: No mass or lymphadenopathy. THORACIC AORTA: No dissection or aneurysm. MAIN PULMONARY ARTERY: Normal in caliber. TRACHEA/BRONCHI: Patent. ESOPHAGUS: No wall thickening or dilatation. HEART: Normal in size. PLEURA: No effusion or pneumothorax. LUNGS: 2 numerous to count small pulmonary nodules are noted scattered  throughout the lungs. Many of these are stable. There are several nodules which  are increased in size. For example in the right lower lobe series 4 image 98  there is a 10 mm nodule increase in size. 7 mm nodule inferior right upper lobe  series 4 image 90 increase in size. A millimeter nodule inferior right lower  lobe series 4 image 88 increase in size. Left upper lobe nodule series 4 image  53 increase in size. In addition, several new nodules are present. LIVER: Small nonspecific enhancing foci are present in the right hepatic lobe  series 2 image 60, image 61, image 63. GALLBLADDER: Surgically absent  BILIARY TREE: Pneumobilia. No biliary dilatation. Status post  hepaticojejunostomy. SPLEEN: Splenic hypodensity unchanged  PANCREAS: Status post Whipple  ADRENALS: Unremarkable. KIDNEYS: Simple cyst in the left kidney. Evidence of chronic renal vein  thrombosis. Persistent perinephric stranding and fluid around the left renal  hilum  STOMACH: Multiple procedure  SMALL BOWEL: Mildly distended small bowel in right upper quadrant likely related  to hepaticojejunostomy  COLON: Colonic diverticulosis  APPENDIX: Normal  PERITONEUM: Small amount of free fluid in the left paracolic gutter.   RETROPERITONEUM: Stable confluent soft tissue density around the aorta and  mesenteric root  REPRODUCTIVE ORGANS:  URINARY BLADDER: No mass or calculus. BONES: No destructive bone lesion. ADDITIONAL COMMENTS: N/A    Impression  1. Multiple pulmonary nodules some of which have increased in size which may  represent progression of disease. Several new nodules are noted. 2.  Nonspecific foci of enhancement in the right lobe the liver. These may  represent focal areas of inflammation or hyperemia given the  hepaticojejunostomy. Attention on follow-up imaging    3. Status post Whipple procedure. 4.  Stable periaortic soft tissue density. 5.  Stable perinephric stranding and fluid around the left kidney hilum    I personally reviewed the images. Stable disease. Assessment:     1. 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 Local recurrence in the para-aortic soft tissue - S/P SBRT     Recent CT shows progression of disease in the retroperitoneum, L4, lungs  The disease is unresectable. Treatment will in a palliative intent with a goal to extend life. Receiving palliative chemotherapy   Cis/[place]/Abraxane - s/p 6 cycles - last cycle 5/31/2021    CT - small progressive lung nodules  I reviewed the films with Radiology. The lesions are too small to biopsy. He is asymptomatic. I recommended observation only at this time. I have sent his images to [facility] for consideration of clinical trial. He is agreeable to that.        2. Cancer related pain - none    S/P celiac plexus block - done by Dr. Jluis Anderson with significant improvement in the pain  Following with Palliative medicine. Taking Oxycodone twice a day and dilaudid 1 every 6 hours      3. Bone metastasis, L4    Nuclear medicine bone scan  Ablation/Kyphoplasty - Dr. Gatito Colón  On Denosumab      4. Nausea, CINV - improved    Aloxi, emend, dexamethasone in OPIC  Olanzapine 10 mg po nightly x 4 starting day of chemotherapy  Zofran as needed       5. Diarrhea from malabsorption - improved    Using Lomotil and opium tincture      6. Chemotherapy related neuropathy    Stable  Grade I       7. Hypocalcemia    Calcium OTC      Plan:       1. Discontinue Gemzar + Abraxane  2. Clinical trial at [facility] to review  3. Fluids today and done weekly  4. Scans in 2 months  5. Follow up in 2 months    I performed a history and physical examination of the patient and discussed his management with the NPP. I reviewed the NPP note and agree with the documented findings and plan of care. The patient was seen in conjunction with Ms. Camacho. Mr. Kwasi Garcia is gentleman with metastatic cholangiocarcinoma. He has been off therapy and observation. CT shows small volume disease progression in the lung. Exam is normal. I am sending his imaging to [facility] for consideration of clinical trial.       Signed by: Chevy Awad MD                     August 5, 2021      CC. Mariella Strange MD  CC. Fausto Garcia MD  CC. Richard Blanco MD  CC. Judy Gzuman MD  CC.  Neo Israel MD

## 2021-08-05 NOTE — PROGRESS NOTES
855- Pt arrived to [facility] ambulatory in no acute distress for C7 Cisplatin/Gemzar/Abraxane. Assessment unremarkable except numbness/tingling to bottoms of feet up to thigh and to fingertips, fatigue, and diarrhea. R chest port accessed without issue and positive blood return noted. Patient to MD office for appt. Patient denied having any symptoms of COVID-19, i.e. SOB, coughing, fever, or generally not feeling well. Also denies having been exposed to COVID-19 recently or having had any recent contact with family/friend that has a pending COVID test.    Labs obtained - CBC w/ diff, CMP, Mag, Ca 19-9  Recent Results (from the past 12 hour(s))   CBC WITH AUTOMATED DIFF    Collection Time: 08/05/21  9:02 AM   Result Value Ref Range    WBC 6.0 4.1 - 11.1 K/uL    RBC 3.85 (L) 4.10 - 5.70 M/uL    HGB 11.7 (L) 12.1 - 17.0 g/dL    HCT 37.3 36.6 - 50.3 %    MCV 96.9 80.0 - 99.0 FL    MCH 30.4 26.0 - 34.0 PG    MCHC 31.4 30.0 - 36.5 g/dL    RDW 13.1 11.5 - 14.5 %    PLATELET 041 (L) 815 - 400 K/uL    MPV 10.9 8.9 - 12.9 FL    NRBC 0.0 0  WBC    ABSOLUTE NRBC 0.00 0.00 - 0.01 K/uL    NEUTROPHILS 78 (H) 32 - 75 %    LYMPHOCYTES 9 (L) 12 - 49 %    MONOCYTES 8 5 - 13 %    EOSINOPHILS 4 0 - 7 %    BASOPHILS 0 0 - 1 %    IMMATURE GRANULOCYTES 1 (H) 0.0 - 0.5 %    ABS. NEUTROPHILS 4.7 1.8 - 8.0 K/UL    ABS. LYMPHOCYTES 0.5 (L) 0.8 - 3.5 K/UL    ABS. MONOCYTES 0.5 0.0 - 1.0 K/UL    ABS. EOSINOPHILS 0.2 0.0 - 0.4 K/UL    ABS. BASOPHILS 0.0 0.0 - 0.1 K/UL    ABS. IMM.  GRANS. 0.1 (H) 0.00 - 0.04 K/UL    DF SMEAR SCANNED      RBC COMMENTS NORMOCYTIC, NORMOCHROMIC     METABOLIC PANEL, COMPREHENSIVE    Collection Time: 08/05/21  9:02 AM   Result Value Ref Range    Sodium 138 136 - 145 mmol/L    Potassium 4.1 3.5 - 5.1 mmol/L    Chloride 106 97 - 108 mmol/L    CO2 27 21 - 32 mmol/L    Anion gap 5 5 - 15 mmol/L    Glucose 170 (H) 65 - 100 mg/dL    BUN 19 6 - 20 MG/DL    Creatinine 0.73 0.70 - 1.30 MG/DL    BUN/Creatinine ratio 26 (H) 12 - 20      GFR est AA >60 >60 ml/min/1.73m2    GFR est non-AA >60 >60 ml/min/1.73m2    Calcium 8.5 8.5 - 10.1 MG/DL    Bilirubin, total 0.4 0.2 - 1.0 MG/DL    ALT (SGPT) 67 12 - 78 U/L    AST (SGOT) 64 (H) 15 - 37 U/L    Alk. phosphatase 193 (H) 45 - 117 U/L    Protein, total 6.1 (L) 6.4 - 8.2 g/dL    Albumin 3.3 (L) 3.5 - 5.0 g/dL    Globulin 2.8 2.0 - 4.0 g/dL    A-G Ratio 1.2 1.1 - 2.2     MAGNESIUM    Collection Time: 08/05/21  9:02 AM   Result Value Ref Range    Magnesium 2.4 1.6 - 2.4 mg/dL       After seeing Dr. Evelia Montes De Oca it was decided treatment would be HELD today    The following medications administered:  Medications Administered     0.9% sodium chloride injection 10 mL     Admin Date  08/05/2021 Action  Given Dose  10 mL Route  IntraVENous Administered By  Bashir Spence RN          heparin (porcine) pf 300-500 Units     Admin Date  08/05/2021 Action  Given Dose  500 Units Route  InterCATHeter Administered By  Bashir Spence RN          sodium chloride (NS) flush 10 mL     Admin Date  08/05/2021 Action  Given Dose  10 mL Route  IntraVENous Administered By  Bashir Spence RN           Admin Date  08/05/2021 Action  Given Dose  10 mL Route  IntraVENous Administered By  Bashir Spence RN          sodium chloride 0.9 % bolus infusion 1,000 mL     Admin Date  08/05/2021 Action  New Bag Dose  1,000 mL Rate  1,000 mL/hr Route  IntraVENous Administered By  Bashir Spence RN                Patient Vitals for the past 12 hrs:   Temp Pulse Resp BP SpO2   08/05/21 1146  (!) 59 16 101/63    08/05/21 0857 98.1 °F (36.7 °C) 72 16 98/61 100 %       1150- Pt tolerated treatment well. Port flushed per policy and de-accessed, 2x2 and tape placed. Pt discharged ambulatory in no acute distress, accompanied by self. Next appointment 8/12/21.

## 2021-08-05 NOTE — LETTER
8/5/2021    Patient: Akshat Henson. YOB: 1955   Date of Visit: 8/5/2021     DO Cecil Shah III. [address]  [facility] [place]    Dear Jose Ayers DO,      Thank you for referring Mr. Kala Bone to [address] [place] [address] for evaluation. My notes for this consultation are attached. If you have questions, please do not hesitate to call me. I look forward to following your patient along with you.       Sincerely,    Darrell Holbrook NP

## 2021-08-06 NOTE — PROGRESS NOTES
CCM Outreach     Call placed to pt, Verified  and address for HIPAA security. Goals      Attends follow-up appointments as directed. PCP -  TBD  Oncologist - 21  Infusion - 21  Palliative - 21  PCP -  TBD  Oncologist - 21  Infusion - 21  Palliative - 21 Call placed to patient, no answer. Voicemail left to return call. 21  Pt was seen at [facility] ER 21. Diagnosis Comment   Rhinitis, chronic    Cancer associated pain    Cough      PCP - TBD  Oncology - 21  Palliative - 21  Infusion - 21 attending appt's       Knowledge and adherence of prescribed medication (ie. action, side effects, missed dose, etc.). Taking prescribed meds including ABX until completed  Culture results pending     6/15/21  Levofloxacin ($) >=8 ug/mL R     Chart forward to oncology & PCP updating on results. 21 confirmed ABX switched to Keflex, currently taking until gone. 21 Pt reported he filled prescribed ABX last night, taking until completed.  Patient verbalizes understanding of self -management goals of living with Diabetes. Lab Results   Component Value Date/Time    Hemoglobin A1c 5.5 10/15/2020 10:50 AM    Hemoglobin A1c (POC) 6.4 2019 03:50 AM     BS - 106    21 BS - 106        Supportive resources in place to maintain patient in the community (ie. Home Health, DME equipment, refer to, medication assistant plan, etc.)      [facility] - # [phone]   Nurse Access line   Be Well  [address] [employer] [facility] 97 Urgent [facility] [phone]  HR Service Now - [facility] Workday  IT - [phone]  [facility] Help - [phone]- [phone]  Associate Services for advice and direction, by calling [phone] and pressing 1         Understands red flags post discharge.       CP, SOB, cough, wheeze, temp >100, increase in pain not managed with pain med    6/17/21 pt denies red flags. Pt denies temp or s/s of infection. Pt reported having diarrhea approx 4-5 stools a day, but that is down from 10-12. Pt reported this has been a chronic issue, provider aware. 7/19/21 pt denies red flags. Denies pain, N/V, cough, wheeze, SOB or temp. BM today, denies blood. Reported appetite has improved. 8/6/21 Denies red flags today. Pt reported one episode of vomiting yesterday and chills last night, but no further issues. Denies temp. Reported chronic diarrhea has reduced to 5X day down from 12.              CM will f/u in 3 weeks

## 2021-08-06 NOTE — PROGRESS NOTES
Akshat Henson. is a 60 y.o. male who presents for evaluation of muscle complaints and feeling cold. Last seen by me oct 26, 2020. He is taking a break from chemotherapy, weight is up 17 lbs. He feels better than he has in a while. Still struggles with lots of loose stools, but at least his n/v is markedly improved. Gets spells every few weeks when he feels very cold, and needs to put on numerous blankets, even when it is 90 degrees outside. A few times he went to ed and got iv fluids, and felt better and went home. He also complains of muscle tightness in both legs, typically happens few times each week. Not debilitating, but somewhat bothersome. Most recent CT scans showed increased mets to lungs. Primary was adeno of pancreas. Underwent whipple in 2017. ROS:  Constitutional: negative for fevers, chills, anorexia and weight loss  Eyes:   negative for visual disturbance and irritation  ENT:   negative for tinnitus,sore throat,nasal congestion,ear pain,hoarseness  Respiratory:  negative for cough, hemoptysis, dyspnea,wheezing  CV:   negative for chest pain, palpitations, lower extremity edema  GI:   negative for nausea, vomiting,  abdominal pain,melena.  ++diarrhea  Genitourinary: negative for frequency, dysuria and hematuria  Musculoskel: negative for myalgias, arthralgias, back pain, muscle weakness, joint pain  Neurological:  negative for headaches, dizziness, focal weakness, numbness  Psychiatric:     Negative for depression or anxiety      Past Medical History:   Diagnosis Date    Aneurysm ([address] 2008    CEREBRAL    Arrhythmia     Previous a.fib, ablation, NSR now; NO LONGER FOLLOWED BY CARDIOLOGIST.     Autoimmune disease ([address]     SJOGREN'S    Cancer ([address] 2020    COMMON BILE DUCT, ADENOCARCINOMA, SPINE    Diabetes (HCC)     NIDDM    Family history of skin cancer     Hypertension     Sepsis ([address] 2017    STENTING TO BILE DUCT    Status post chemotherapy     Stroke ([address] 2008    brain aneurysm - no deficits    Sun-damaged skin     Sunburn, blistering        Past Surgical History:   Procedure Laterality Date    HX CHOLECYSTECTOMY      HX GI      COLONOSCOPY, POLYPS (BENIGN)    [facility]     HX GI  2020    WHIPPLE REVISION    HX HEART CATHETERIZATION  2005    Ablation     HX HERNIA REPAIR  12/2020    HERNIA REPAIR    HX ORTHOPAEDIC  2000    Spinal fusion W/ HARDWARE    HX OTHER SURGICAL  11/07/2017    [place] Cath, Dr. Thomas Course HX OTHER SURGICAL  01/11/2021    RIGHT IJ PORT-A-CATH PLACEMENT     HX VASCULAR ACCESS  2017    PORTACATH - then removed    IR KYPHOPLASTY LUMBAR  1/12/2021    NEUROLOGICAL PROCEDURE UNLISTED  2005    [place] hole washout from cerebral hemorrhage    HI ABDOMEN SURGERY PROC UNLISTED  10/2017    APRIL       Family History   Problem Relation Age of Onset    Cancer Father         Breast and Colon, MELANOMA    Hypertension Father     Cancer Mother         LEUKEMIA    No Known Problems Sister     Atrial Fibrillation Brother     No Known Problems Sister     No Known Problems Son     No Known Problems Son     Anesth Problems Neg Hx        Social History     Socioeconomic History    Marital status:      Spouse name: Not on file    Number of children: Not on file    Years of education: Not on file    Highest education level: Not on file   Occupational History    Not on file   Tobacco Use    Smoking status: Never Smoker    Smokeless tobacco: Never Used   Vaping Use    Vaping Use: Never used   Substance and Sexual Activity    Alcohol use: Never     Comment: very rare    Drug use: No    Sexual activity: Yes     Partners: Female   Other Topics Concern    Not on file   Social History Narrative    Not on file     Social Determinants of Health     Financial Resource Strain:     Difficulty of Paying Living Expenses:    Food Insecurity:     Worried About Running Out of Food in the Last Year:     Ran Out of Food in the Last Year:    Transportation Needs:     Lack of Transportation (Medical):  Lack of Transportation (Non-Medical):    Physical Activity:     Days of Exercise per Week:     Minutes of Exercise per Session:    Stress:     Feeling of Stress :    Social Connections:     Frequency of Communication with Friends and Family:     Frequency of Social Gatherings with Friends and Family:     Attends L[employer] day Services:     Active Member of Clubs or Organizations:     Attends Club or Organization Meetings:     Marital Status:    Intimate Partner Violence:     Fear of Current or Ex-Partner:     Emotionally Abused:     Physically Abused:     Sexually Abused:             Visit Vitals  /62 (BP 1 Location: Right arm, BP Patient Position: Sitting, BP Cuff Size: Adult)   Pulse 80   Temp 98.5 °F (36.9 °C) (Temporal)   Resp 18   Ht 5' 8\" (1.727 m)   Wt 161 lb (73 kg)   SpO2 97%   BMI 24.48 kg/m²       Physical Examination:   General - thin, fragile appearing male. Looks better though since he has gained some weight back. HEENT - PERRL, TM no erythema/opacification, normal nasal turbinates, no oropharyngeal erythema or exudate, MMM  Neck - supple, no bruits, no thyroidomegaly, no lymphadenopathy  Pulm - clear to auscultation bilaterally  Cardio - RRR, normal S1 S2, no murmur  Abd - soft, nontender, no masses, no HSM  Extrem - no edema, +2 distal pulses  Neuro-  No focal deficits, CN intact     Assessment/Plan:    1. Muscle cramps--check cpk, mg, phos  2.  Dm, type 2--on jardiance. Check a1c  3. b12 def--on replacement, check levels  4. Vit e def--on replacement, check levels  5. Vit a def--on replacement, check levels  6. bph--on avodart, flomax, prscar  7. Chronic pain from panc cancer--follows with palliative care  8. Chronic diarrhea--bit improved, on morphine and creon  9.   Hx L5 comp fx--much improved sp kyphoplasty    rtc 3 months for cpe, ? wtm        Gianluca Mojica III, DO

## 2021-08-06 NOTE — PROGRESS NOTES
Chief Complaint   Patient presents with    Tremors     c/o Bilateral leg Tremors and Numbness; Cold Spells x 2 to 3 Month     Muscle Pain     c/o muscle tightness bilaterla legs back and head (Cause headaches)     Vitals:    08/06/21 1121 08/06/21 1133   BP: (!) 92/49 100/62   BP 1 Location: Left arm Right arm   BP Patient Position: Sitting Sitting   BP Cuff Size: Adult Adult   Pulse: 80 80   Temp: 98.5 °F (36.9 °C)    TempSrc: Temporal    Resp: 18    Height: 5' 8\" (1.727 m)    Weight: 161 lb (73 kg)    SpO2: 97%        1. Have you been to the ER, urgent care clinic since your last visit? Hospitalized since your last visit? Yes When: 07/16/2021 [facility] Due to Abdominal Pain  and Vomiting    2. Have you seen or consulted any other health care providers outside of the [address] [place] [address] since your last visit? Include any pap smears or colon screening.  No

## 2021-08-06 NOTE — PATIENT INSTRUCTIONS
Diarrhea: Care Instructions  Your Care Instructions     Diarrhea is loose, watery stools (bowel movements). The exact cause is often hard to find. Sometimes diarrhea is your body's way of getting rid of what caused an upset stomach. Viruses, food poisoning, and many medicines can cause diarrhea. Some people get diarrhea in response to emotional stress, anxiety, or certain foods. Almost everyone has diarrhea now and then. It usually isn't serious, and your stools will return to normal soon. The important thing to do is replace the fluids you have lost, so you can prevent dehydration. The doctor has checked you carefully, but problems can develop later. If you notice any problems or new symptoms, get medical treatment right away. Follow-up care is a key part of your treatment and safety. Be sure to make and go to all appointments, and call your doctor if you are having problems. It's also a good idea to know your test results and keep a list of the medicines you take. How can you care for yourself at home? · Watch for signs of dehydration, which means your body has lost too much water. Dehydration is a serious condition and should be treated right away. Signs of dehydration are:  ? Increasing thirst and dry eyes and mouth. ? Feeling faint or lightheaded. ? A smaller amount of urine than normal.  · To prevent dehydration, drink plenty of fluids. Choose water and other caffeine-free clear liquids until you feel better. If you have kidney, heart, or liver disease and have to limit fluids, talk with your doctor before you increase the amount of fluids you drink. · Begin eating small amounts of mild foods the next day, if you feel like it. ? Try yogurt that has live cultures of Lactobacillus. (Check the label.)  ? Avoid spicy foods, fruits, alcohol, and caffeine until 48 hours after all symptoms are gone. ? Avoid chewing gum that contains sorbitol. ?  Avoid dairy products (except for yogurt with Lactobacillus) while you have diarrhea and for 3 days after symptoms are gone. · The doctor may recommend that you take over-the-counter medicine, such as loperamide (Imodium), if you still have diarrhea after 6 hours. Read and follow all instructions on the label. Do not use this medicine if you have bloody diarrhea, a high fever, or other signs of serious illness. Call your doctor if you think you are having a problem with your medicine. When should you call for help? Call 911 anytime you think you may need emergency care. For example, call if:    · You passed out (lost consciousness).     · Your stools are maroon or very bloody. Call your doctor now or seek immediate medical care if:    · You are dizzy or lightheaded, or you feel like you may faint.     · Your stools are black and look like tar, or they have streaks of blood.     · You have new or worse belly pain.     · You have symptoms of dehydration, such as:  ? Dry eyes and a dry mouth. ? Passing only a little urine. ? Feeling thirstier than usual.     · You have a new or higher fever. Watch closely for changes in your health, and be sure to contact your doctor if:    · Your diarrhea is getting worse.     · You see pus in the diarrhea.     · You are not getting better after 2 days (48 hours). Where can you learn more? Go to http://www.gray.com/  Enter [id number] in the search box to learn more about \"Diarrhea: Care Instructions. \"  Current as of: February 26, 2020               Content Version: 12.8  © 2006-2021 [employer]. Care instructions adapted under license by [employer] (which disclaims liability or warranty for this information). If you have questions about a medical condition or this instruction, always ask your healthcare professional. [address] any warranty or liability for your use of this information.

## 2021-08-08 NOTE — PROGRESS NOTES
Overall labs look good. B12 is on low end of normal though. Would increase shots from every other week, to weekly. Vit A and vit E results still pending.

## 2021-08-10 NOTE — PROGRESS NOTES
Called, spoke to pt. Two identifiers confirmed. Pt notified of results/recommendations per Dr. Trung Watson. Pt verbalized understanding of information discussed w/ no further questions at this time. Overall labs look good.  B12 is on low end of normal though.  Would increase shots from every other week, to weekly.     Vit a good, vit e increase to 2 times daily.

## 2021-08-12 NOTE — PROGRESS NOTES
Outpatient Infusion Center Short Visit Progress Note    1500 Patient admitted to [address] [place] [address] for Hydration ambulatory in stable condition. Assessment completed. No new concerns voiced. Covid Screening      1. Do you have any symptoms of COVID-19? SOB, coughing, fever, or generally not feeling well ? NO  2. Have you been exposed to COVID-19 recently? NO  3. Have you had any recent contact with family/friend that has a pending COVID test? NO    Vital Signs:  Patient Vitals for the past 12 hrs:   Temp Pulse Resp BP SpO2   08/12/21 1620  (P) 80 (P) 16 (!) (P) 92/55    08/12/21 1513 (P) 98 °F (36.7 °C) (P) 85 (P) 16 (!) (P) 92/59 95 %         Venous Access Device Port 01/11/20 (Active)   Central Line Being Utilized Yes 08/12/21 1500   Criteria for Appropriate Use Long term IV/antibiotic administration 08/12/21 1500   Site Assessment Clean, dry, & intact 08/12/21 1500   Date of Last Dressing Change 08/12/21 08/12/21 1500   Dressing Status New 08/12/21 1500   Dressing Type Transparent 08/12/21 1500   Action Taken Open ports on tubing capped 08/12/21 1500   Date Accessed (Medial Site) 08/12/21 08/12/21 1500   Access Time (Medial Site) 1515 08/12/21 1500   Access Needle Size (Site #1) 20 G 08/12/21 1500   Access Needle Length (Medial Site) 0.75 inches 08/12/21 1500   Positive Blood Return (Medial Site) Yes 08/12/21 1500   Action Taken (Medial Site) Flushed; Infusing 08/12/21 1500   Alcohol Cap Used Yes 08/05/21 0900     Medications:  Medications Administered     0.9% sodium chloride injection 10 mL     Admin Date  08/12/2021 Action  Given Dose  10 mL Route  IntraVENous Administered By  Tomy FALL          heparin (porcine) pf 300-500 Units     Admin Date  08/12/2021 Action  Given Dose  500 Units Route  InterCATHeter Administered By  Tomy FALL          sodium chloride (NS) flush 10 mL     Admin Date  08/12/2021 Action  Given Dose  10 mL Route  IntraVENous Administered By  Krystin Harper Date  08/12/2021 Action  Given Dose  10 mL Route  IntraVENous Administered By  Rafat Greco A          sodium chloride 0.9 % bolus infusion 1,000 mL     Admin Date  08/12/2021 Action  New Bag Dose  1,000 mL Rate  1,000 mL/hr Route  IntraVENous Administered By  Rafat Greco A              Patient's port flushed and heprainized; de-accessed per protocol. Patient tolerated treatment well. Patient discharged from [place] [address] ambulatory in no distress at 1630. Patient aware of next appointment.     Future Appointments   Date Time Provider Sloane Marley   8/19/2021  3:00 PM PEMBERTON FASTRACK 3 [address] REG   8/26/2021  3:00 PM PEMBERTON FASTRACK 3 [address] REG   9/2/2021  3:00 PM PEMBERTON FASTRACK 4 [address] REG   9/22/2021  8:30 AM [facility] 2 [facility] REG   9/22/2021  9:00 AM [facility] NM INJ RM 1 [facility] REG   9/22/2021 12:00 PM [facility] NM RM 1 [facility] REG   9/23/2021  3:00 PM PEMBERTON FASTRACK 5 [facility] REG   9/30/2021  2:00 PM [facility]   10/21/2021  3:00 PM PEMBERTON FASTRACK 5 [address] REG   10/21/2021  3:15 PM Yanelis Hernandez MD [facility] BS AMB   11/10/2021  8:00 AM Magda Raya III, DO [facility] BS AMB

## 2021-08-19 NOTE — PROGRESS NOTES
Outpatient Infusion Center Progress Note    1500  Pt arrived in stable condition for Hydration    Assessment completed, no new complaints. Patient denied having any symptoms of COVID-19, i.e. SOB, coughing, fever, or generally not feeling well. Also denies having been exposed to COVID-19 recently or having had any recent contact with family/friend that has a pending COVID test.     R chest port accessed without issue and positive blood return noted. Patient Vitals for the past 12 hrs:   Temp Pulse Resp BP   08/19/21 1514 98.1 °F (36.7 °C) 87 18 108/66          The following medications administered:  Medications Administered     heparin (porcine) pf 300-500 Units     Admin Date  08/19/2021 Action  Given Dose  500 Units Route  InterCATHeter Administered By  Mindy Meraz RN          sodium chloride (NS) flush 10 mL     Admin Date  08/19/2021 Action  Given Dose  10 mL Route  IntraVENous Administered By  Mindy Meraz RN          sodium chloride 0.9 % bolus infusion 1,000 mL     Admin Date  08/19/2021 Action  New Bag Dose  1,000 mL Rate  1,000 mL/hr Route  IntraVENous Administered By  Mindy Meraz RN                1630  Pt discharged in no acute distress.  Next appointment:    Future Appointments   Date Time Provider Sloane Roach   8/26/2021  3:00 PM PEMBERTON FASTRACK 3 [address] REG   9/2/2021  3:00 PM PEMBERTON FASTRACK 4 [address] REG   9/22/2021  8:30 AM [facility] CT 2 [facility] REG   9/22/2021  9:00 AM [facility] NM INJ RM 1 [facility]NM [facility] REG   9/22/2021 12:00 PM [facility] NM RM 1 [facility] REG   9/23/2021  3:00 PM [facility]   9/30/2021  2:00 PM Meli Eng MD 1000 [facility] BS AMB   10/21/2021  3:00 PM PEMBERTON FASTRACK 5 [address] REG   10/21/2021  3:15 PM Jamal Dickerson MD [facility] BS AMB   11/10/2021  8:00 AM Czajkowski, Terris Fleischer III, DO [facility] BS AMB

## 2021-08-26 NOTE — PROGRESS NOTES
[facility] Progress Note    Date: 2021    Name: Astrid Sahu. MRN: [id number]         : 1955    Mr. Cristina arrived (ambulation) at 1500 and in no distress for Zometa and hydration. Assessment was completed, no acute issues at this time, no new complaints voiced. Covid Screening    Patient denied having any symptoms of COVID-19, i.e. SOB, coughing, fever, or generally not feeling well. Also denies having been exposed to COVID-19 recently or having had any recent contact with family/friend that has a pending COVID test.       Right chest port accessed without difficulty with 0.75 inch cagle needle; + blood return noted, labs drawn and sent. Mr. Reza Andrews vitals were reviewed. Patient Vitals for the past 12 hrs:   Temp Pulse Resp BP SpO2   21 1622  70 16 104/62 98 %   21 1506 97.9 °F (36.6 °C) 80 18 105/65 98 %       Lab results were obtained and reviewed. Recent Results (from the past 12 hour(s))   MAGNESIUM    Collection Time: 21  3:14 PM   Result Value Ref Range    Magnesium 2.2 1.6 - 2.4 mg/dL   PHOSPHORUS    Collection Time: 21  3:14 PM   Result Value Ref Range    Phosphorus 3.3 2.6 - 4.7 MG/DL   POC CHEM8    Collection Time: 21  3:15 PM   Result Value Ref Range    Calcium, ionized (POC) 1.16 1.12 - 1.32 mmol/L    Sodium (POC) 142 136 - 145 mmol/L    Potassium (POC) 3.3 (L) 3.5 - 5.1 mmol/L    Chloride (POC) 108 (H) 98 - 107 mmol/L    CO2 (POC) 21.7 21 - 32 mmol/L    Anion gap (POC) 13 10 - 20 mmol/L    Glucose (POC) 241 (H) 65 - 100 mg/dL    Creatinine (POC) 0.91 0.6 - 1.3 mg/dL    GFRAA, POC >60 >60 ml/min/1.73m2    GFRNA, POC >60 >60 ml/min/1.73m2    Comment Comment Not Indicated.        I. Ca: 1.16 within parameters, injection given in left arm     Medication:  Medications Administered     0.9% sodium chloride injection 10 mL     Admin Date  2021 Action  Given Dose  10 mL Route  IntraVENous Administered By  Juan Carlos Milian Date  08/26/2021 Action  Given Dose  10 mL Route  IntraVENous Administered By  Alcus Ree R          denosumab (XGEVA) injection 120 mg     Admin Date  08/26/2021 Action  Given Dose  120 mg Route  SubCUTAneous Administered By  Alcus Ree R          heparin (porcine) pf 300-500 Units     Admin Date  08/26/2021 Action  Given Dose  500 Units Route  InterCATHeter Administered By  Alcus Ree R          sodium chloride (NS) flush 10 mL     Admin Date  08/26/2021 Action  Given Dose  10 mL Route  IntraVENous Administered By  Alcus Ree R          sodium chloride 0.9 % bolus infusion 1,000 mL     Admin Date  08/26/2021 Action  New Bag Dose  1,000 mL Rate  1,000 mL/hr Route  IntraVENous Administered By  Alcus Ree R              Patient port flushed; heparinized; and de-accessed per protocol. Mr. Valerio Billingsley tolerated treatment well and was discharged from [place] [address] in stable condition at 1630. He is aware of when to return for his next appointment.     Future Appointments   Date Time Provider Sloane Roach   9/2/2021  3:00 PM PEMBERTON FASTRACK 4 [address] REG   9/22[facility]2021  [facility]   9/22/2021  9:00 AM [facility] NM INJ RM 1 [facility] REG   9/22/2021 12:00 PM [facility] NM RM [facility] REG   9/23/2021  3:00 PM PEMBERTON FASTRACK 5 [facility] REG   9/30/2021  2:00 PM David Mathew MD [facility] BS AMB   10/21/2021  3:00 PM PEMBERTON FASTRACK 5 [address] REG   10/21/2021  3:15 PM Juan Antonio Taylor MD [facility] BS AMB   11/10/2021  8:00 AM Kwame Mckeon DO [facility] BS AMB       Fermin Alvarado  August 26, 2021

## 2021-08-26 NOTE — PROGRESS NOTES
[facility] Progress Note    Date: 2021    Name: Dami Pierre. MRN: [id number]         : 1955    Mr. Cristina arrived (ambulation) at 1300 and in no distress for Hydration and Xgeva regimen. Assessment was completed, no acute issues at this time. Pt was wearing compression socking due to +1 edema in bilateral lower extremities. Pt reported continued fatigue      Covid Screening    Patient denied having any symptoms of COVID-19, i.e. SOB, coughing, fever, or generally not feeling well. Also denies having been exposed to COVID-19 recently or having had any recent contact with family/friend that has a pending COVID test.      Right chest port accessed without difficulty with 0.75 cagle needle; + blood return noted, labs drawn and sent. Mr. Loretta Grullon vitals were reviewed. Patient Vitals for the past 12 hrs:   Temp Pulse Resp BP SpO2   21 1622  70 16 104/62 98 %   21 1506 97.9 °F (36.6 °C) 80 18 105/65 98 %       Lab results were obtained and reviewed. Recent Results (from the past 12 hour(s))   MAGNESIUM    Collection Time: 21  3:14 PM   Result Value Ref Range    Magnesium 2.2 1.6 - 2.4 mg/dL   PHOSPHORUS    Collection Time: 21  3:14 PM   Result Value Ref Range    Phosphorus 3.3 2.6 - 4.7 MG/DL   POC CHEM8    Collection Time: 21  3:15 PM   Result Value Ref Range    Calcium, ionized (POC) 1.16 1.12 - 1.32 mmol/L    Sodium (POC) 142 136 - 145 mmol/L    Potassium (POC) 3.3 (L) 3.5 - 5.1 mmol/L    Chloride (POC) 108 (H) 98 - 107 mmol/L    CO2 (POC) 21.7 21 - 32 mmol/L    Anion gap (POC) 13 10 - 20 mmol/L    Glucose (POC) 241 (H) 65 - 100 mg/dL    Creatinine (POC) 0.91 0.6 - 1.3 mg/dL    GFRAA, POC >60 >60 ml/min/1.73m2    GFRNA, POC >60 >60 ml/min/1.73m2    Comment Comment Not Indicated.        Pt's corrected Calcium was 8.62       Medication:  Medications Administered     0.9% sodium chloride injection 10 mL     Admin Date  2021 Action  Given Dose  10 mL Route  IntraVENous Administered By  [place] Leaf          denosumab (XGEVA) injection 120 mg     Admin Date  04/29/2021 Action  Given Dose  120 mg Route  SubCUTAneous Administered By  Judit Kuhn R          heparin (porcine) pf 500 Units     Admin Date  04/29/2021 Action  Given Dose  500 Units Route  IntraVENous Administered By  Roise Kuhn R          sodium chloride (NS) flush 5-10 mL     Admin Date  04/29/2021 Action  Given Dose  10 mL Route  IntraVENous Administered By  Jennifer Sjogren Date  04/29/2021 Action  Given Dose  10 mL Route  IntraVENous Administered By  Judit Kuhn R          sodium chloride 0.9 % bolus infusion 1,000 mL     Admin Date  04/29/2021 Action  New Bag Dose  1,000 mL Rate  1,000 mL/hr Route  IntraVENous Administered By  Judit Kuhn R                Patient port flushed; heparinized; and de-accessed per protocol. Mr. Cipriano Santiago tolerated treatment well and was discharged from [place] [address] in stable condition at 1430. He is aware of when to return for his next appointment.     Future Appointments   Date Time Provider Sloane Roach   9/2/2021  3:00 PM PEMBERTON FASTRACK 4 [address] REG   9/22/2021  8:30 AM [facility] 2 [facility] REG   9/22/2021  9:00 AM [facility] INJ RM 1 [facility] REG   9/22/2021 12:00 PM [facility]   9/23/2021  3:00 PM PEMBERTON FASTRACK 5 [facility] REG   9/30/2021  2:00 PM [facility] Burger [facility]   10/21/2021  3:00 PM PEMBERTON FASTRACK 5 [address] REG   10/21/2021  3:15 PM Nona Rain MD [facility] [place] BS AMB   11/10/2021  8:00 AM Luis Kaye DO [facility] BS AMB       Rani Alvarado  August 26, 2021

## 2021-08-31 NOTE — TELEPHONE ENCOUNTER
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 -Appointment not scheduled prior to need for scheduled refill      Pharmacy: [facility] [address]        Medication:opium tincture 10 mg/mL (morphine) liquid      Dose and directions: Take 1 mL by mouth every six (6) hours   Number dispensed:118ml   Date filled ( or Pharmacy):8/2/2021  #left:     reviewed: _yes     Date of Urine Drug Screen:  _n/a     Opioid Safety Handout given:  _yes     Appropriate for refill:  _yes     Action:  _ please refill

## 2021-08-31 NOTE — TELEPHONE ENCOUNTER
----- Message from [place]. Jim Moscoso. sent at 8/30/2021  7:36 AM EDT -----  Regarding: Prescription Question  Contact: [phone]  Ton Luna,  I need a refill for the  opium tincture. It is really helping decrease the frequency of stools. I use [address] [place] [address] Pharmacy.   Thank you,  Venkat Osborne

## 2021-09-02 NOTE — PROGRESS NOTES
1455- Pt arrived to [facility] ambulatory in no acute distress for Hydration. Assessment completed, no new complaints voiced. R chest port accessed without issue and positive blood return noted. Patient denied having any symptoms of COVID-19, i.e. SOB, coughing, fever, or generally not feeling well. Also denies having been exposed to COVID-19 recently or having had any recent contact with family/friend that has a pending COVID test.    The following medications administered:  Medications Administered     heparin (porcine) pf 300-500 Units     Admin Date  09/02/2021 Action  Given Dose  500 Units Route  InterCATHeter Administered By  Carmen Lakhani RN          sodium chloride (NS) flush 10 mL     Admin Date  09/02/2021 Action  Given Dose  10 mL Route  IntraVENous Administered By  Carmen Lakhani RN           Admin Date  09/02/2021 Action  Given Dose  10 mL Route  IntraVENous Administered By  Carmen Lakhani RN          sodium chloride 0.9 % bolus infusion 1,000 mL     Admin Date  09/02/2021 Action  New Bag Dose  1,000 mL Rate  1,000 mL/hr Route  IntraVENous Administered By  Carmen Lakhani RN                Patient Vitals for the past 12 hrs:   Temp Pulse Resp BP SpO2   09/02/21 1610  71 16 99/61    09/02/21 1454 98.1 °F (36.7 °C) 86 16 99/63 98 %       1610- Pt tolerated treatment well. Port flushed per policy and de-accessed, 2x2 and tape placed. Pt discharged ambulatory in no acute distress, accompanied by self. Next appointment 9/9/21.

## 2021-09-09 NOTE — PROGRESS NOTES
Outpatient Infusion Center Progress Note    0932  Pt arrived in stable condition for hydration. Assessment completed; pt's SOB with exertion is still present but improving as well as his weakness/fatigue; had 4-5 episodes per day of diarrhea this week. Patient denied having any symptoms of COVID-19, i.e. SOB, coughing, fever, or generally not feeling well. Also denies having been exposed to COVID-19 recently or having had any recent contact with family/friend that has a pending COVID test.     R chest port accessed without issue and positive blood return noted. Patient Vitals for the past 12 hrs:   Temp Pulse Resp BP   09/09/21 1623  63 16 111/65   09/09/21 1515 98 °F (36.7 °C) 89 16 105/66          The following medications administered:  Medications Administered     heparin (porcine) pf 300-500 Units     Admin Date  09/09/2021 Action  Given Dose  500 Units Route  InterCATHeter Administered By  ESVIN Bobby          sodium chloride (NS) flush 10 mL     Admin Date  09/09/2021 Action  Given Dose  10 mL Route  IntraVENous Administered By  ESVIN Bobby           Admin Date  09/09/2021 Action  Given Dose  10 mL Route  IntraVENous Administered By  Diamante RN          sodium chloride 0.9 % bolus infusion 1,000 mL     Admin Date  09/09/2021 Action  New Bag Dose  1,000 mL Rate  1,000 mL/hr Route  IntraVENous Administered By  Diamante, RN                Pt tolerated treatment well. Port flushed per policy and deaccessed. 1620  Pt discharged in no acute distress.  Next appointment:    Future Appointments   Date Time Provider Sloane Marley   9/16/2021  3:00 PM NIECY HESS 5 [facility] REG   9[facility]22[facility]2021  [facility]   9/22/2021  9:00 AM [facility] NM INJ RM 1 MRMRNM [facility] REG   9/22/2021 12:00 PM [facility] NM RM 1 MRMRNM [facility] REG   9/23/2021  2:15 PM Latrice Rodriguez MD [facility] BS AMB   9/23/2021  3:00 PM NIECY HESS 5 [facility] REG 9/30/2021  3:00 PM PEMBERTON FASTRACK 5 [address] REG   10/7/2021  1:00 PM [facility]   10/21/2021  3:00 PM PEMBERTON FASTRACK 5 [address] REG   11/10/2021  8:00 AM Hernandez Mcclure III, DO [facility] BS AMB   11/18/2021  3:00 PM PEMBERTON FASTRACK 5 [facility] REG   12/16/2021  3:00 PM PEMBERTON FASTRACK 5 [facility] REG

## 2021-09-16 NOTE — PROGRESS NOTES
Pt arrived to [facility] ambulatory in no acute distress at 1500 for Hydration.  Assessment unremarkable. R chest port accessed without issue and positive blood return noted.     Patient denied having any symptoms of COVID-19, i.e. SOB, coughing, fever, or generally not feeling well. Also denies having been exposed to COVID-19 recently or having had any recent contact with family/friend that has a pending COVID test.    Patient Vitals for the past 12 hrs:   Temp Pulse Resp BP SpO2   09/16/21 1605  68 18 98/63    09/16/21 1458 96.8 °F (36 °C) 71 17 113/73 94 %       The following medications administered:  Medications Administered     0.9% sodium chloride injection 10 mL     Admin Date  09/16/2021 Action  Given Dose  10 mL Route  IntraVENous Administered By  Libra Calzada RN           Admin Date  09/16/2021 Action  Given Dose  10 mL Route  IntraVENous Administered By  Libra Calzada RN          heparin (porcine) pf 300-500 Units     Admin Date  09/16/2021 Action  Given Dose  500 Units Route  InterCATHeter Administered By  Libra Clazada RN          sodium chloride 0.9 % bolus infusion 1,000 mL     Admin Date  09/16/2021 Action  New Bag Dose  1,000 mL Rate  1,000 mL/hr Route  IntraVENous Administered By  Libra Calzada RN                Pt tolerated treatment well. Port flushed per policy and de-accessed, 2x2 and tape placed.  Pt discharged ambulatory in no acute distress at 1605, accompanied by self. Next appointment 9/23/21 at 1500.

## 2021-09-20 NOTE — TELEPHONE ENCOUNTER
----- Message from [place] [facility]. sent at 9/19/2021  6:23 PM EDT -----  Regarding: Prescription Question  Contact: [phone]  Ton Becker. I need a refill for the Diphenoxylate-Atropin  I am taking 1 tablet three times per day. I have 3- 4 stools per day. They are lose but not as frequent. I am taking the opium 3-4 times per day. Thank you for your support. I use [facility] [phone].   Randal Martínez

## 2021-09-23 NOTE — PROGRESS NOTES
Malina Parker. is a 60 y.o. male here for follow up for cholangiocarcinoma. Treatment today:  Cycle 7 Day 1 Xgeva    1. Have you been to the ER, urgent care clinic since your last visit? Hospitalized since your last visit? no    2. Have you seen or consulted any other health care providers outside of the [address] [place] [address] since your last visit? Include any pap smears or colon screening. no    Pt states he has felt good every day this month. Keeps getting better and better.

## 2021-09-23 NOTE — PROGRESS NOTES
[address] [facility] [address], [address] [facility], [address] [place] [address], [address] [place] [address]  [phone]    Follow-up Note      Patient: Dickey Canavan. MRN: [id number]  SSN: [id number]    YOB: 1955  Age: 60 y.o. Sex: male        Diagnosis:     1. 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 S/P Pancreatoduodenectomy on  10/06/2017  2. Adjuvant monotherapy with Capecitabine - s/p 6 cycles   (12/4/2017 - 05/2018)  3. SBRT RP node/soft tissue 04/2020  4. Palliative chemotherapy   Cis/[place]/Abraxane - s/p 6 cycles - discontinued d/t progression of disease    Subjective:      Dickey Canavan. is a 60 y.o. male with a diagnosis of extrahepatic cholangiocarcinoma with metastatic to the lungs, bones and retroperitoneal node/soft tissue. He presented to the ED in August 2017 with obstructive jaundice. He was found to have an obstructive lesion in the dital bile duct. The CT showed marked intrahepatic biliary dilation and common bile duct dilation to the level of the mid common bile duct, which ws markedly narrowed. He underwent a stenting procedure followed by spyglass cholangiogram. The brushings revealed a diagnosis of cholangiocarcinoma. He underwent a Whipple procedure on 10/06/2017. He completed 6 cycles of adjuvant Xeloda. PET scan showed increased activity in the soft tissue along the SMA. CT guided biopsy showed local recurrence. He underwent SBRT by Dr. Chanda Reis in April 2020. He was admitted with severe back pain. Repeat CT shows growth of tumor in the L4/L5, lung and RP node/soft tissue. He underwent a CT guided celiac plexus block which has helped the back pain immensely. A biopsy of the L4 vertebrae confirms a diagnosis of metastatic cholangiocarcinoma.       Jonnie received palliative chemotherapy. He has been off of treatment since the end of May. He is here today to discuss the most recent scans. He is feeling well. Review of Systems:    Constitutional: fatigue  Eyes: negative  Ears, Nose, Mouth, Throat, and Face: negative  Respiratory: negative  Cardiovascular: negative  Gastrointestinal: diarrhea (controlled with tincture of opium)  Genitourinary:negative  Integument/Breast: negative  Hematologic/Lymphatic: negative  Musculoskeletal: B/L ankle edema  Neurological: numbness/tingling B/L feet        Past Medical History:   Diagnosis Date    Aneurysm ([facility] 2008    CEREBRAL    Arrhythmia     Previous a.fib, ablation, NSR now; NO LONGER FOLLOWED BY CARDIOLOGIST.     Autoimmune disease ([facility]     SJOGREN'S    Cancer ([facility] 2020    COMMON BILE DUCT, ADENOCARCINOMA, SPINE    Diabetes ([facility]     NIDDM    Family history of skin cancer     Hypertension     Sepsis ([facility] [address] 2017    STENTING TO BILE DUCT    Status post chemotherapy     Stroke [facility]) 2008    brain aneurysm - no deficits    Sun-damaged skin     Sunburn, blistering      Past Surgical History:   Procedure Laterality Date    HX CHOLECYSTECTOMY      HX GI      COLONOSCOPY, POLYPS (BENIGN)    HX GI  10/06/2017    [facility]     HX GI  2020    WHIPPLE REVISION    HX HEART CATHETERIZATION  2005    Ablation     HX HERNIA REPAIR  12/2020    HERNIA REPAIR    HX ORTHOPAEDIC  2000    Spinal fusion W/ HARDWARE    HX OTHER SURGICAL  11/07/2017    [place] Cath, Dr. Abimbola Rod HX OTHER SURGICAL  01/11/2021    RIGHT IJ PORT-A-CATH PLACEMENT     HX VASCULAR ACCESS  2017    PORTACATH - then removed    IR KYPHOPLASTY LUMBAR  1/12/2021    NEUROLOGICAL PROCEDURE UNLISTED  2005    Ting hole washout from cerebral hemorrhage    PA ABDOMEN SURGERY PROC UNLISTED  10/2017    APRIL      Family History   Problem Relation Age of Onset    Cancer Father         Breast and Colon, MELANOMA    Hypertension Father  Cancer Mother         LEUKEMIA    No Known Problems Sister     Atrial Fibrillation Brother     No Known Problems Sister     No Known Problems Son     No Known Problems Son     Anesth Problems Neg Hx      Social History     Tobacco Use    Smoking status: Never Smoker    Smokeless tobacco: Never Used   Substance Use Topics    Alcohol use: Never     Comment: very rare      Prior to Admission medications    Medication Sig Start Date End Date Taking? Authorizing Provider   diphenoxylate-atropine (LomotiL) 2.5-0.025 mg per tablet Take 1 Tablet by mouth three (3) times daily as needed for Diarrhea. Max Daily Amount: 3 Tablets. 9/20/21  Yes Rashid Soler MD   opium tincture 10 mg/mL (morphine) liquid Take 1 mL by mouth every six (6) hours as needed for Diarrhea for up to 30 days. Max Daily Amount: 4 mL. 8/31/21 9/30/21 Yes Dayne Phipps MD   cyanocobalamin (VITAMIN B12) 1,000 mcg/mL injection 1 mL by IntraMUSCular route every seven (7) days. 8/12/21  Yes Silas Raya III, DO   calcium carbonate (CALCIUM 500 PO) Take  by mouth. Yes Provider, Historical   azelastine (ASTEPRO) 205.5 mcg (0.15 %) 2 Sprays by Both Nostrils route two (2) times a day. 7/16/21  Yes Sherren Becket, MD   vitamin A (AQUASOL A) 10,000 unit capsule TAKE 1 CAPSULE BY MOUTH ONE TIME A DAY 6/16/21  Yes Silas Raya III, DO   OLANZapine (ZyPREXA) 10 mg tablet Take 1 Tab by mouth See Admin Instructions. Take nightly for 4 days starting the evening of chemotherapy. 1/29/21  Yes Ashlyn Starr MD   pantoprazole (PROTONIX) 40 mg tablet TAKE 1 TABLET BY MOUTH EVERY DAY 1/11/21  Yes Natalie Verduzco MD   lidocaine-prilocaine (EMLA) topical cream Apply  to affected area as needed for Pain. 30-45 min prior to treatments 1/7/21  Yes Ashlyn Starr MD   ondansetron (ZOFRAN ODT) 4 mg disintegrating tablet Take 1 Tab by mouth every eight (8) hours as needed for Nausea or Vomiting.  1/7/21  Yes Ashlyn Starr MD dexAMETHasone (DECADRON) 1 mg tablet Take 2 tablets by mouth twice daily for 14 days 12/31/20  Yes Aubrie Pradhan MD   dutasteride (AVODART) 0.5 mg capsule Take 1 Cap by mouth daily. 12/21/20  Yes Estephania Guillen DO   Creon 36,000-114,000- 180,000 unit cpDR capsule Take 4 Caps by mouth three (3) times daily (with meals). 2-3 caps each meal and 1 cap for snacks (see additional order) 12/21/20  Yes Silas Raya III,    naloxone (NARCAN) 4 mg/actuation nasal spray Use 1 spray intranasally, then discard. Repeat with new spray every 2 min as needed for opioid overdose symptoms, alternating nostrils. 12/11/20  Yes Tiny Henley MD   aluminum & magnesium hydroxide-simethicone (Maalox Maximum Strength) 400-400-40 mg/5 mL suspension Take 10 mL by mouth every six (6) hours as needed for Indigestion. Yes Provider, Historical   vitamin E (AQUA GEMS) 400 unit capsule Take 1 Cap by mouth daily. 10/26/20  Yes Silas Raya III,    empagliflozin (Jardiance) 25 mg tablet Take 1 Tab by mouth nightly. 10/20/20  Yes Silas Raya III,    gabapentin (NEURONTIN) 100 mg capsule Take 3 capsules by mouth twice a day. 10/20/20  Yes Silas Raya III,    tamsulosin (FLOMAX) 0.4 mg capsule TAKE ONE CAPSULE BY MOUTH EVERY EVENING 10/20/20  Yes Silas Raya III, DO   ramipriL (ALTACE) 5 mg capsule Take 1 Cap by mouth daily. Patient taking differently: Take 5 mg by mouth nightly. 10/20/20  Yes Silas Raya III, DO   lidocaine (LIDODERM) 5 % 1 Patch by TransDERmal route every twenty-four (24) hours. Apply patch to the affected area for 12 hours a day and remove for 12 hours a day. 10/15/20  Yes Silas Raya III,    loperamide (IMODIUM) 2 mg capsule Take 2-4 mg by mouth as needed for Diarrhea. Give 4 mg after first loose stool. Give an additional 2 mg after each loose stool as needed up to a maximum of 8 doses (16 mg/day).    Yes Provider, Historical finasteride (PROSCAR) 5 mg tablet TAKE 1 TABLET BY MOUTH EVERY DAY 4/13/20  Yes Provider, Historical   acetaminophen (TYLENOL) 325 mg tablet Take 2 Tabs by mouth every four (4) hours as needed for Pain or Fever (Over the counter medication). 1/2/20  Yes Tameka Fuentes NP   alpha-D-galactosidase (BEANO PO) Take 1 Tab by mouth two (2) times a day. Yes Other, MD Flynn   cetirizine (ZYRTEC) 10 mg tablet Take 10 mg by mouth nightly. Yes Provider, Historical          Allergies   Allergen Reactions    Shellfish Derived Anaphylaxis     Soft shell crabs    Morphine Nausea and Vomiting    Pcn [Penicillins] Other (comments)     Pt stated \"always been told PCN\"  Pt tolerated other cephalosporins in the past           Objective:     Visit Vitals  /72 (BP 1 Location: Left upper arm, BP Patient Position: Sitting)   Pulse 74   Temp 98.2 °F (36.8 °C)   Ht 5' 8\" (1.727 m)   Wt 160 lb 12.8 oz (72.9 kg)   SpO2 94%   BMI 24.45 kg/m²     Pain Scale: 0 - No pain/10      Physical Exam:     GENERAL: alert, cooperative, no distress, appears stated age  EYE: negative  LYMPHATIC: Cervical, supraclavicular, and axillary nodes normal.   THROAT & NECK: normal and no erythema or exudates noted.    LUNG: clear to auscultation bilaterally  HEART: irregularly, irregular rythm  ABDOMEN: soft, non-tender  EXTREMITIES: trace ankle edema  SKIN: Scabs on top of head  NEUROLOGIC: Numbness in fingertips and feet      Physical exam and ROS has been modified from a prior visit to make it relevant and current        Lab Results   Component Value Date/Time    WBC 6.0 08/05/2021 09:02 AM    Hemoglobin (POC) 10.3 (L) 10/06/2017 01:14 PM    HGB 11.7 (L) 08/05/2021 09:02 AM    Hematocrit (POC) 28 (L) 05/13/2021 09:16 AM    HCT 37.3 08/05/2021 09:02 AM    PLATELET 254 (L) 13/57/5484 09:02 AM    MCV 96.9 08/05/2021 09:02 AM       Lab Results   Component Value Date/Time    Sodium 138 08/05/2021 09:02 AM    Potassium 4.1 08/05/2021 09:02 AM Chloride 106 08/05/2021 09:02 AM    CO2 27 08/05/2021 09:02 AM    Anion gap 5 08/05/2021 09:02 AM    Glucose 170 (H) 08/05/2021 09:02 AM    BUN 19 08/05/2021 09:02 AM    Creatinine 0.73 08/05/2021 09:02 AM    BUN/Creatinine ratio 26 (H) 08/05/2021 09:02 AM    GFR est AA >60 08/05/2021 09:02 AM    GFR est non-AA >60 08/05/2021 09:02 AM    Calcium 8.5 08/05/2021 09:02 AM    Bilirubin, total 0.4 08/05/2021 09:02 AM    Alk. phosphatase 193 (H) 08/05/2021 09:02 AM    Protein, total 6.1 (L) 08/05/2021 09:02 AM    Albumin 3.3 (L) 08/05/2021 09:02 AM    Globulin 2.8 08/05/2021 09:02 AM    A-G Ratio 1.2 08/05/2021 09:02 AM    ALT (SGPT) 67 08/05/2021 09:02 AM    AST (SGOT) 64 (H) 08/05/2021 09:02 AM       CT Results (most recent):  Results from Hospital Encounter encounter on 09/22/21    CT ABD PELV W CONT    Narrative  EXAM: CT CHEST W CONT, CT ABD PELV W CONT    INDICATION: Cholangiocarcinoma    COMPARISON: 8/2/2021    CONTRAST: 100 mL of Isovue-370. TECHNIQUE:  Following the uneventful intravenous administration of contrast, thin axial  images were obtained through the chest, abdomen and pelvis. Coronal and sagittal  reconstructions were generated. Oral contrast was administered. CT dose  reduction was achieved through use of a standardized protocol tailored for this  examination and automatic exposure control for dose modulation. FINDINGS:    CHEST WALL:No axillary or supraclavicular adenopathy. Right chest wall  Port-A-Cath. THYROID: No nodule. MEDIASTINUM: No mass or lymphadenopathy. ANGEL: No mass or lymphadenopathy. THORACIC AORTA: No dissection or aneurysm. MAIN PULMONARY ARTERY: Normal in caliber. TRACHEA/BRONCHI: Patent. ESOPHAGUS: No wall thickening or dilatation. HEART: Coronary atherosclerotic disease  PLEURA: No effusion or pneumothorax. LUNGS: Too numerous to count pulmonary nodules. These are stable to slightly  increased in size.  For instance there is a 10 mm nodule along the minor fissure  series 4 image 74 which is slightly increase in size. There is a 9 mm nodule on  the right major fissure series 4 image 94 which is slightly increased in size. LIVER: Ill-defined areas of hypervascularity in the right hepatic lobe are again  noted without significant change. No new liver lesions are identified. GALLBLADDER: Unremarkable. BILIARY TREE: Pneumobilia  SPLEEN: Splenomegaly. Spleen measures 15.8 cm  PANCREAS: Status post Whipple procedure. ADRENALS: Unremarkable. KIDNEYS: Confluent fluid density around the left renal hilum with irregularity  of the vasculature without significant change  STOMACH: Status post Whipple procedure  SMALL BOWEL: Status post Whipple procedure  COLON: Colonic diverticulosis. APPENDIX: Unremarkable  PERITONEUM: No ascites or pneumoperitoneum. RETROPERITONEUM: Confluent soft tissue density around the aorta and celiac axis  as well as SMA without significant change in size measuring approximately 5.4 x  3.6 cm  REPRODUCTIVE ORGANS: Unremarkable  URINARY BLADDER: No mass or calculus. BONES: No destructive bone lesion. ADDITIONAL COMMENTS: N/A    Impression  1. Multiple pulmonary nodules. Some of the nodules demonstrate slight interval  increase in size. These are too numerous to count. 2.  Previously noted ill-defined areas of enhancement in the right hepatic lobe  are not significantly changed. 3.  Status post Whipple procedure. 4.  Confluent density around the aorta and celiac axis without significant  change. 5.  Confluent fluid density around the left renal hilum unchanged. I personally reviewed the images. Progression of disease in the lungs      Assessment:     1.  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 Local recurrence in the para-aortic soft tissue - S/P SBRT     Recent CT shows progression of disease in the retroperitoneum, L4, lungs  The disease is unresectable. Treatment will in a palliative intent with a goal to extend life. Receiving palliative chemotherapy   Cis/[place]/Abraxane - s/p 6 cycles - last cycle 5/31/2021    CT - small progressive lung nodules  He is asymptomatic. I recommended observation only at this time. I have asked [facility] for consideration of clinical trial.      2. Cancer related pain - none    S/P celiac plexus block - done by Dr. Arreola Shall with significant improvement in the pain  Following with Palliative medicine. Taking Oxycodone twice a day and dilaudid 1 every 6 hours      3. Bone metastasis, L4    Nuclear medicine bone scan  Ablation/Kyphoplasty - Dr. Arreola Shall  On Denosumab       4. Diarrhea from malabsorption - improved    Using Lomotil and opium tincture      5. Chemotherapy related neuropathy    Stable  Grade I       6. Atrial fibrillation    Refer to Cardiology      Plan:       1. Clinical trial at [place] [id number]   2. EKG and Cardiology referral  3. Follow up in 2 months      Signed by: Sneha Curran MD                     September 23, 2021      CC. Armando Goyal MD  CC. Cindy Sanz MD  CC. Yohan Lu MD  CC. Ammon Mills MD  CC.  Chery Frank MD

## 2021-09-23 NOTE — PROGRESS NOTES
[facility]patient [facility]fusion [facility] Progress Note    Pt admit to [facility] for Xgeva in stable condition. Assessment completed. Patient denied having any symptoms of COVID-19, i.e. SOB, coughing, fever, or generally not feeling well. Also denies having been exposed to COVID-19 recently or having had any recent contact with family/friend that has a pending COVID test.     R chest port accessed with 0.75\" Omari Palm with positive blood return. Labs drawn per order and processed/sent. Line flushed per protocol and Omari Palm removed. Patient Vitals for the past 12 hrs:   Temp Pulse Resp BP SpO2   09/23/21 1548 98.3 °F (36.8 °C) 73 18 101/62 96 %        Recent Results (from the past 12 hour(s))   MAGNESIUM    Collection Time: 09/23/21  3:38 PM   Result Value Ref Range    Magnesium 2.4 1.6 - 2.4 mg/dL   PHOSPHORUS    Collection Time: 09/23/21  3:38 PM   Result Value Ref Range    Phosphorus 3.5 2.6 - 4.7 MG/DL   POC CHEM8    Collection Time: 09/23/21  3:44 PM   Result Value Ref Range    Calcium, ionized (POC) 1.14 1.12 - 1.32 mmol/L    Sodium (POC) 138 136 - 145 mmol/L    Potassium (POC) 4.0 3.5 - 5.1 mmol/L    Chloride (POC) 108 (H) 98 - 107 mmol/L    CO2 (POC) 25.2 21 - 32 mmol/L    Anion gap (POC) 6 (L) 10 - 20 mmol/L    Glucose (POC) 125 (H) 65 - 100 mg/dL    Creatinine (POC) 0.82 0.6 - 1.3 mg/dL    GFRAA, POC >60 >60 ml/min/1.73m2    GFRNA, POC >60 >60 ml/min/1.73m2    Comment Comment Not Indicated. Medications:  Medications Administered     denosumab (XGEVA) injection 120 mg     Admin Date  09/23/2021 Action  Given Dose  120 mg Route  SubCUTAneous Administered By  [employer]          heparin (porcine) pf 500 Units     Admin Date  09/23/2021 Action  Given Dose  500 Units Route  IntraVENous Administered By  [employer]          sodium chloride (NS) flush 5-10 mL     Admin Date  09/23/2021 Action  Given Dose  30 mL Route  IntraVENous Administered By  [employer]                 Pt tolerated treatment well.  D/c home in no distress. Pt aware of next [facility] appointment scheduled for: 10/21/21 at 1500.     Future Appointments   Date Time Provider Sloane Marley   10/7/2021  1:00 PM Tyrese Stock MD [facility] BS AMB   10/21/2021  3:00 PM PEMBERTON FASTRACK [address] REG   11/10/2021  8:00 AM Mary Polk III, DO [facility] BS AMB   11/18/2021  3:00 PM PEMBERTON FASTRACK 1 [facility] REG   12/16/2021  3:00 PM PEMBERTON FASTRACK 3 [facility] REG

## 2021-09-23 NOTE — LETTER
9/23/2021    Patient: Christine Lynn. YOB: 1955   Date of Visit: 9/23/2021     Chuy Campo III, DO  [address]  [address]  [facility]  Via In [facility] [address]    Dear Margarette Corrales,      Thank you for referring Mr. Von Berkowitz to [address] [place] [address] for evaluation. My notes for this consultation are attached. If you have questions, please do not hesitate to call me. I look forward to following your patient along with you.       Sincerely,    Papito hSaffer MD

## 2021-10-01 NOTE — TELEPHONE ENCOUNTER
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 -Appointment not scheduled prior to need for scheduled refill      Pharmacy: _[facility] [address]    Medication:opium tincture 10 mg/mL (morphine) liquid      Dose and directions: Take 1 mL by mouth every six (6) hours   Number dispensed:118ml  Date filled ( or Pharmacy):8/31/2021  # left:         reviewed: _yes     Date of Urine Drug Screen:  _n/a    Opioid Safety Handout given:  _yes    Appropriate for refill:  _yes    Action:  _ please refill

## 2021-10-04 NOTE — TELEPHONE ENCOUNTER
Calling patient to confirm his VV on 10/7/2021 at 1:00pm.  Patient confirmed. Patient states he needs his opium tincture 10 mg/ml. Advised that a refill has been sent on 10/1/2021 to pharmacy for him to check with them and call us back if they did not receive it. Patient confirmed.

## 2021-10-07 NOTE — PROGRESS NOTES
Palliative Medicine Office Visit  Palliative Medicine Nurse Check In  [phone]    Patient Name: Chelle Pérez. YOB: 1955      Date of Office Visit: 10/5/2021    Patient states: \"  \"    From Check In Sheet (scanned in Media):  Has a medical provider talked with you about cardiopulmonary resuscitation (CPR)? [x] Yes   [] No   [] Unable to obtain    Nurse reminder to complete or update ACP FlowSheet:    Is ACP on the Problem List?    [x] Yes    [] No  IF ACP Document is ON FILE; Nurse to place ACP on Problem List     Is there an ACP Note in Chart Review/Note? [x] Yes    [] No   If NO: ALERT PROVIDER       Primary Decision MakerMaruna Mustafa - [phone]    Secondary Decision Maker: Ridge Cristina III - Child - [phone]    Supplemental (Other) Decision Maker: Glynn Jamilaalyssa Child - [phone]  Advance Care Planning 10/7/2021   Patient's [address] is: Named in scanned ACP document   Primary Decision Maker Name -   Primary Decision Maker Phone Number -   Primary Decision Maker Relationship to Patient -   Confirm Advance Directive Yes, on file   Patient Would Like to Complete Advance Directive -   Does the patient have other document types -         Is there anything that we should know about you as a person in order to provide you the best care possible? Have you been to the ER, urgent care clinic since your last visit? [] Yes   [x] No   [] Unable to obtain    Have you been hospitalized since your last visit? [] Yes   [x] No   [] Unable to obtain    Have you seen or consulted any other health care providers outside of the [address] [place] [address] since your last visit?    [] Yes   [x] No   [] Unable to obtain    Functional status (describe):       Last BM: 10/7/2021     accessed (date): 10/7/2021    Bottle review (for opioid pain medication):  Medication 1:   Date filled:   Directions:   # filled:   # left:   # pills taking per day:  Last dose taken:    Medication 2:   Date filled:   Directions:   # filled:   # left:   # pills taking per day:  Last dose taken:    Medication 3:   Date filled:   Directions:   # filled:   # left:   # pills taking per day:  Last dose taken:    Medication 4:   Date filled:   Directions:   # filled:   # left:   # pills taking per day:  Last dose taken:

## 2021-10-07 NOTE — PROGRESS NOTES
Palliative Medicine Outpatient Services  Lee: [phone] ([phone])    Patient Name: Svetlana Del Cid YOB: 1955    Date of Current Visit: 10/07/21  Location of Current Visit:    [] [facility] Office  [] [facility] Office  [] [address] [place] [address]  [] Home  [x] Other: Virtual synchronous A/V visit    Date of Initial Visit: 4/6/2020  Referral from: Dr. Jonas Larson  Primary Care Physician: Ava Paulson DO      SUMMARY:   Svetlana Del Cid is a 73y.o. 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 Jonas Larson for management of symptoms and psychosocial support. 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. This is second marriage for both of them. Current treatment-palliative chemotherapy cisplatin plus gemcitabine plus Abraxane. Oncology has asked NCI/NIH for consideration of clinical trial.    Previous treatment-completed RT to the para-aortic mass, pursuing diagnostics with GI physician Dr. Glory Hoover for gastroparesis and pancreatic enzymes, has had biliary stents replaced. Status post celiac plexus block and reversal of Whipple procedure on 9/9/2020    Hospitalization at [facility] for uncontrolled pain in December 2020    L5 biopsy, ablation and kyphoplasty on 1/12/2021     PALLIATIVE DIAGNOSES:       ICD-10-CM ICD-9-CM    1. Cholangiocarcinoma of biliary tract (HCC)  C22.1 155.1    2. Functional diarrhea  K59.1 564.5    3. Lower abdominal pain  R10.30 789.09    4.  Neoplastic malignant related fatigue R53.0 780.79    5. Vitamin deficiency  E56.9 269.2           PLAN:   Patient Instructions   Dear Damon Hennessy.,     It was a pleasure seeing you today by virtual video visit.     This is the plan we talked about:    -Disease discussion  -You feel a lot better. The more time that goes by, the better you feel.  -You had some imaging a few weeks ago. Overall you think the results were okay. You said the new spots are too small to really worry about.  -You are planning a bone scan and CT scan in November.  -As long as things keep looking good, then you do not plan for any more chemo.     -Chronic low back pain from new L5 met status post ablation and kyphoplasty  -Your back is doing well. Any pain that you have is very mild in your back and hips. You use tylenol or aleve as needed if you do have pain, and this is sufficient for you at this time.  -You were able to wean off your opioid pills.     -Chemo-induced diarrhea  -You do still have a lot of water in your stool. You have a bowel movement about 2-3 times in the day and 3-4 times at night. This is a lot better than it was before starting the opium tincture. -You are taking opium tincture every 6 hours and this has been very helpful. You take Lomotil and Imodium along with this.  -You are now taking colestipol 2 hours after breakfast. You have been taking it for about 2 weeks now without much difference in the quality of your bowel movements, but you'll give it a try at least until the prescription is gone. -You continue to take Creon every time you eat. -You do have some lower abdominal cramping that you think is related to the colestipol because the cramping did not start until after you began taking the colestipol. Cramping is a known potential side effect of colestipol. We discussed that if the cramping is bothersome, then you could stop the colestipol for a few days to see if the cramping improves.     -Sjogren's disease  -You are very careful to stay well hydrated. You drink water and sugar-free gatorade or powerade.     -Multivitamin deficiency  -You were found to have severe vitamin A, vitamin D, vitamin E deficiency. You are on replacement therapy now. You continue to follow with your PCP about this. You had labs drawn in August and have had your vitamin supplementation adjusted due to this.    -Fatigue  -Sometimes you cannot do what you want to do because of the tiredness of your legs. You can get what you need to do done as long as you take breaks. -Appetite  -Your appetite has improved greatly since the last visit. You are taking boost or ensure every day. -Follow up  -We will see you back in 3 months. Please call if you need us sooner.  reviewed and appropriate. Most recent CT scan reviewed and results discussed with patient. See results below in data portion of note.          GOALS OF CARE / TREATMENT PREFERENCES:   [====Goals of Care====]  GOALS OF CARE:  Patient / health care proxy stated goals: See Patient Instructions / Summary    TREATMENT PREFERENCES:   Code Status:  [x] Attempt Resuscitation       [] Do Not Attempt Resuscitation    Advance Care Planning:  [x] The [facility] Interdisciplinary Team has updated the ACP Navigator with Decision Maker and Patient Capacity      Primary Decision MakerKatana paula [facility] - [phone]    Secondary Decision Maker: Ridge Cristina III - Child - [phone]    Supplemental (Other) Decision Maker: Amaury Blue Child - [phone]  Advance Care Planning 10/7/2021   Patient's Healthcare Decision Maker is: Named in scanned ACP document   Primary Decision Maker Name -   Primary Decision Maker Phone Number -   Primary Decision Maker Relationship to Patient -   Confirm Advance Directive Yes, on file   Patient Would Like to Complete Advance Directive -   Does the patient have other document types -       Other:  (If patient appropriate for POST, consider using PALLPOST smart phrase here)    The palliative care team has discussed with patient / health care proxy about goals of care / treatment preferences for patient.  [====Goals of Care====]     PRESCRIPTIONS GIVEN:     No orders of the defined types were placed in this encounter. FOLLOW UP:     Future Appointments   Date Time Provider Sloane Marley   10/21/2021  3:00 PM PEMBERTON FASTRACK [address] REG   11/10/2021  8:00 AM Aylin Mcdaniels DO [facility] BS AMB   11/18/2021  3:00 PM PEMBERTON FASTRACK 1 [address] REG   11/24/2021 10:15 AM Rishi Felton MD [facility] BS AMB   12/16/2021  3:00 PM PEMBERTON FASTRACK 3 [address] REG   12/30/2021  9:30 AM Alfonso Finnegan MD [facility] BS AMB           PHYSICIANS INVOLVED IN CARE:   Patient Care Team:  Aylin Mcdaniels DO as PCP - General (Internal Medicine)  Aylin Mcdaniels DO as PCP - [facility] Empaneled Provider  Karlene White MD (General Surgery)  Diane Velasquez MD (Gastroenterology)  Bella Rm MD (Gastroenterology)  Rishi Felton MD (Hematology and Oncology)  Alfonso Finnegan MD as Palliative Care (Palliative Medicine)       HISTORY:   Reviewed patient-completed ESAS and advance care planning form. Reviewed patient record in prescription monitoring program.    CHIEF COMPLAINT:   Chief Complaint   Patient presents with    Other     Chronic diarrhea       HPI/SUBJECTIVE:    The patient is: [x] Verbal / [] Nonverbal       Patient is seen by virtual visit today for follow up. He feels his conditions has overall been improving since the last visit.  ____    Jakub Cowan are doing well pain wise, he is now off all opioids. He does have some leftover Dilaudid at home for emergencies but otherwise he is not on regular opioids. He only takes gabapentin and opium tincture for the diarrhea. He was doing really well off chemo but he notices now that he is more short of breath and tires easily. He has follow-up scans and will be seeing Dr. Betzy Rivera soon.     Diarrhea substantial without opium tincture and you would like to continue this for now. I will provide you with refills. -------    Patient here with his wife. Both are very good historians. Mid upper back pain-much improved since radiation to the periaortic mass. He struggled with pain for several months before it was identified as cancer recurrence. He was started on fentanyl 25 mcg patch which he wants to wean off of. He has made upper and mid zone abdominal pain which comes and goes. He has not noticed any specific pattern to the pain but usually the pain comes on before vomiting or relieves without vomiting. Sometimes, he vomits food that he ate about 12 to 14 hours ago. No constipation. He has 2 liquid bowel movements every day. He is unable to tolerate eggs or honey. He is getting tests done to evaluate his pancreatic enzymes. He has very good support through his wife. Clinical Pain Assessment (nonverbal scale for nonverbal patients):   [++++ Clinical Pain Assessment++++]  [++++Pain Severity++++]: Pain: 2  [++++Pain Character++++]: cramping  [++++Pain Duration++++]: months  [++++Pain Effect++++]: functional   [++++Pain Factors++++]: none in particular  [++++Pain Frequency++++]: on and off  [++++Pain Location++++]: upper abdomen and mid back  [++++ Clinical Pain Assessment++++]       FUNCTIONAL ASSESSMENT:     Palliative Performance Scale (PPS):  PPS: 70       PSYCHOSOCIAL/SPIRITUAL SCREENING:     Any spiritual / Evangelical concerns:  [] Yes /  [x] No    Caregiver Burnout:  [] Yes /  [x] No /  [] No Caregiver Present      Anticipatory grief assessment:   [x] Normal  / [] Maladaptive       ESAS Anxiety: Anxiety: 0    ESAS Depression: Depression: 0       REVIEW OF SYSTEMS:     The following systems were [x] reviewed / [] unable to be reviewed  Systems: constitutional, ears/nose/mouth/throat, respiratory, gastrointestinal, genitourinary, musculoskeletal, integumentary, neurologic, psychiatric, endocrine.  Positive findings noted below. Modified ESAS Completed by: provider   Fatigue: 2 Drowsiness: 5   Depression: 0 Pain: 2   Anxiety: 0 Nausea: 0   Anorexia: 0 Dyspnea: 4   Best Well-Bein Constipation: No   Other Problem (Comment): 0          PHYSICAL EXAM:     Wt Readings from Last 3 Encounters:   21 160 lb 9.6 oz (72.8 kg)   21 160 lb 12.8 oz (72.9 kg)   21 162 lb 11.2 oz (73.8 kg)     There were no vitals taken for this visit. Last bowel movement: See Nursing Note    Constitutional    [x] Appears well-developed and well-nourished in no apparent distress    [] Abnormal:  Mental status  [x] Alert and awake  [x] Oriented to person/place/time  [x] Able to follow commands  [] Abnormal:   Eyes  [x] EOM normal   [x] Sclera normal   [x] No visible ocular discharge  [] Abnormal:   HENT  [x] Normocephalic, atraumatic  [x] Mouth/Throat: Moist mucous membranes   [x] External Ears normal  [] Abnormal:  Neck  [x] No visualized mass  [] Abnormal:  Pulmonary/Chest   [x] Respiratory effort normal  [x] No visualized signs of difficulty breathing or respiratory distress  [] Abnormal:  Musculoskeletal  [x] Normal gait with no signs of ataxia  [x] Normal range of motion of neck  [] Abnormal:  Neurological:   [x] No facial asymmetry (Cranial nerve 7 motor function)  [x] No gaze palsy  [] Abnormal:   Skin  [x] No significant exanthematous lesions or discoloration noted on facial skin  [] Abnormal:                                  Psychiatric  [x] Normal affect  [x] No hallucinations  [] Abnormal:    Other pertinent observable physical exam findings:    Due to this being a TeleHealth evaluation, many elements of the physical examination are unable to be assessed. HISTORY:     Past Medical History:   Diagnosis Date    Aneurysm ([facility] [address]     CEREBRAL    Arrhythmia     Previous a.fib, ablation, NSR now; NO LONGER FOLLOWED BY CARDIOLOGIST.     Autoimmune disease ([facility] [address] [facility]     SJOGREN'S    Cancer ([facility] [address]     COMMON BILE DUCT, ADENOCARCINOMA, SPINE    Diabetes ([facility] [address]     NIDDM    Family history of skin cancer     Hypertension     Sepsis ([facility] [address] 2017    STENTING TO BILE DUCT    Status post chemotherapy     Stroke [facility]) 2008    brain aneurysm - no deficits    Sun-damaged skin     Sunburn, blistering       Past Surgical History:   Procedure Laterality Date    HX CHOLECYSTECTOMY      HX GI      COLONOSCOPY, POLYPS (BENIGN)    HX GI  10/06/2017    Whipple [facility]rey [facility]     HX GI  2020    WHIPPLE REVISION    HX HEART CATHETERIZATION  2005    Ablation     HX HERNIA REPAIR  12/2020    HERNIA REPAIR    HX ORTHOPAEDIC  2000    Spinal fusion W/ HARDWARE    HX OTHER SURGICAL  11/07/2017    [place], Dr. Ivett Sadler HX OTHER SURGICAL  01/11/2021    RIGHT IJ PORT-A-CATH PLACEMENT     HX VASCULAR ACCESS  2017    PORTACATH - then removed    IR KYPHOPLASTY LUMBAR  1/12/2021    NEUROLOGICAL PROCEDURE UNLISTED  2005    Ting hole washout from cerebral hemorrhage    IN ABDOMEN SURGERY PROC UNLISTED  10/2017    APRIL      Family History   Problem Relation Age of Onset    Cancer Father         Breast and Colon, MELANOMA    Hypertension Father     Cancer Mother         LEUKEMIA    No Known Problems Sister     Atrial Fibrillation Brother     No Known Problems Sister     No Known Problems Son     No Known Problems Son     Anesth Problems Neg Hx       History reviewed, no pertinent family history.   Social History     Tobacco Use    Smoking status: Never Smoker    Smokeless tobacco: Never Used   Substance Use Topics    Alcohol use: Never     Comment: very rare     Allergies   Allergen Reactions    Shellfish Derived Anaphylaxis     Soft shell crabs    Morphine Nausea and Vomiting    Pcn [Penicillins] Other (comments)     Pt stated \"always been told PCN\"  Pt tolerated other cephalosporins in the past      Current Outpatient Medications   Medication Sig    opium tincture 10 mg/mL (morphine) liquid Take 1 mL by mouth every six (6) hours as needed for Diarrhea for up to 30 days. Max Daily Amount: 4 mL.  diphenoxylate-atropine (LomotiL) 2.5-0.025 mg per tablet Take 1 Tablet by mouth three (3) times daily as needed for Diarrhea. Max Daily Amount: 3 Tablets.  cyanocobalamin (VITAMIN B12) 1,000 mcg/mL injection 1 mL by IntraMUSCular route every seven (7) days.  calcium carbonate (CALCIUM 500 PO) Take  by mouth.  azelastine (ASTEPRO) 205.5 mcg (0.15 %) 2 Sprays by Both Nostrils route two (2) times a day.  vitamin A (AQUASOL A) 10,000 unit capsule TAKE 1 CAPSULE BY MOUTH ONE TIME A DAY    OLANZapine (ZyPREXA) 10 mg tablet Take 1 Tab by mouth See Admin Instructions. Take nightly for 4 days starting the evening of chemotherapy.  pantoprazole (PROTONIX) 40 mg tablet TAKE 1 TABLET BY MOUTH EVERY DAY    lidocaine-prilocaine (EMLA) topical cream Apply  to affected area as needed for Pain. 30-45 min prior to treatments    ondansetron (ZOFRAN ODT) 4 mg disintegrating tablet Take 1 Tab by mouth every eight (8) hours as needed for Nausea or Vomiting.  dexAMETHasone (DECADRON) 1 mg tablet Take 2 tablets by mouth twice daily for 14 days    dutasteride (AVODART) 0.5 mg capsule Take 1 Cap by mouth daily.  Creon 36,000-114,000- 180,000 unit cpDR capsule Take 4 Caps by mouth three (3) times daily (with meals). 2-3 caps each meal and 1 cap for snacks (see additional order)    naloxone (NARCAN) 4 mg/actuation nasal spray Use 1 spray intranasally, then discard. Repeat with new spray every 2 min as needed for opioid overdose symptoms, alternating nostrils.  aluminum & magnesium hydroxide-simethicone (Maalox Maximum Strength) 400-400-40 mg/5 mL suspension Take 10 mL by mouth every six (6) hours as needed for Indigestion.  vitamin E (AQUA GEMS) 400 unit capsule Take 1 Cap by mouth daily.  empagliflozin (Jardiance) 25 mg tablet Take 1 Tab by mouth nightly.     gabapentin (NEURONTIN) 100 mg capsule Take 3 capsules by mouth twice a day.  tamsulosin (FLOMAX) 0.4 mg capsule TAKE ONE CAPSULE BY MOUTH EVERY EVENING    ramipriL (ALTACE) 5 mg capsule Take 1 Cap by mouth daily. (Patient taking differently: Take 5 mg by mouth nightly.)    loperamide (IMODIUM) 2 mg capsule Take 2-4 mg by mouth as needed for Diarrhea. Give 4 mg after first loose stool. Give an additional 2 mg after each loose stool as needed up to a maximum of 8 doses (16 mg/day).  finasteride (PROSCAR) 5 mg tablet TAKE 1 TABLET BY MOUTH EVERY DAY    acetaminophen (TYLENOL) 325 mg tablet Take 2 Tabs by mouth every four (4) hours as needed for Pain or Fever (Over the counter medication).  alpha-D-galactosidase (BEANO PO) Take 1 Tab by mouth two (2) times a day.  cetirizine (ZYRTEC) 10 mg tablet Take 10 mg by mouth nightly.  colestipoL (COLESTID) 1 gram tablet     lidocaine (LIDODERM) 5 % 1 Patch by TransDERmal route every twenty-four (24) hours. Apply patch to the affected area for 12 hours a day and remove for 12 hours a day. (Patient not taking: Reported on 10/7/2021)     No current facility-administered medications for this visit. LAB DATA REVIEWED:     Lab Results   Component Value Date/Time    WBC 6.0 08/05/2021 09:02 AM    HGB 11.7 (L) 08/05/2021 09:02 AM    PLATELET 095 (L) 85/04/0112 09:02 AM     Lab Results   Component Value Date/Time    Sodium 138 08/05/2021 09:02 AM    Potassium 4.1 08/05/2021 09:02 AM    Chloride 106 08/05/2021 09:02 AM    CO2 27 08/05/2021 09:02 AM    BUN 19 08/05/2021 09:02 AM    Creatinine 0.73 08/05/2021 09:02 AM    Calcium 8.5 08/05/2021 09:02 AM    Magnesium 2.4 09/23/2021 03:38 PM    Phosphorus 3.5 09/23/2021 03:38 PM      Lab Results   Component Value Date/Time    Alk.  phosphatase 193 (H) 08/05/2021 09:02 AM    Protein, total 6.1 (L) 08/05/2021 09:02 AM    Albumin 3.3 (L) 08/05/2021 09:02 AM    Globulin 2.8 08/05/2021 09:02 AM     Lab Results   Component Value Date/Time    INR 1.1 09/09/2020 02:15 AM    Prothrombin time 11.5 (H) 09/09/2020 02:15 AM    aPTT 29.9 09/09/2020 02:15 AM      Lab Results   Component Value Date/Time    Iron 31 (L) 01/02/2020 03:24 AM    TIBC 245 (L) 01/02/2020 03:24 AM    Iron % saturation 13 (L) 01/02/2020 03:24 AM    Ferritin 122 01/02/2020 03:24 AM        Chest CT 9/22/21:  IMPRESSION  1. Multiple pulmonary nodules. Some of the nodules demonstrate slight interval  increase in size. These are too numerous to count. 2.  Previously noted ill-defined areas of enhancement in the right hepatic lobe  are not significantly changed. 3.  Status post Whipple procedure. 4.  Confluent density around the aorta and celiac axis without significant  change. 5.  Confluent fluid density around the left renal hilum unchanged. Chest CT- aug 2021  IMPRESSION  1. Multiple pulmonary nodules some of which have increased in size which may  represent progression of disease. Several new nodules are noted. 2.  Nonspecific foci of enhancement in the right lobe the liver. These may  represent focal areas of inflammation or hyperemia given the  hepaticojejunostomy. Attention on follow-up imaging  3. Status post Whipple procedure. 4.  Stable periaortic soft tissue density. 5.  Stable perinephric stranding and fluid around the left kidney hilum     CT chest, abdomen and pelvis-May 2021  IMPRESSION  1. Unchanged appearance of the chest, abdomen, and pelvis when compared to  3/1/2021. No acute findings or evidence of disease progression. 2. Unchanged small pulmonary nodules. Unchanged retroperitoneal soft tissue mass  with left renal vein occlusion and collateral retroperitoneal venous structures.     CT abd 3/1/21  IMPRESSION  1. No significant change in the appearance of pulmonary nodules. 2. No significant change in the appearance of retroperitoneal adenopathy/mass.   There is mass effect upon the vasculature, including occlusion of the left renal  vein with collateral vessels in the retroperitoneum, likely unchanged. 3. Incidental findings as above. CT chest 12/29/2020  IMPRESSION:  1. Numerous tiny pulmonary parenchymal nodules right greater than left,  suspicious for metastatic disease. 2. Focal ill-defined opacity in the right upper lobe which may represent  infiltrate. Follow-up recommended, however. 3. Incidental findings in the upper abdomen described earlier same day. CONTROLLED SUBSTANCES SAFETY ASSESSMENT (IF ON CONTROLLED SUBSTANCES):     Reviewed opioid safety handout:  [x] Yes   [] No  24 hour opioid dose >150mg morphine equivalent/day:  [] Yes   [x] No  Benzodiazepines:  [] Yes   [x] No  Sleep apnea:  [] Yes   [x] No  Urine Toxicology Testing within last 6 months:  [] Yes   [x] No  History of or new aberrant medication taking behaviors:  [] Yes   [x] No  Has Narcan been prescribed [x] Yes   [] No          Total time:   Counseling / coordination time:   > 50% counseling / coordination?:   Consent:  He and/or health care decision maker is aware that that he may receive a bill for this telehealth service, depending on his insurance coverage, and has provided verbal consent to proceed: Yes    CPT Codes 70055-82467 for Established Patients may apply to this Telehealth Visit  Pursuant to the emergency declaration under the [facility] waiver authority and the [employer] General Act, this Virtual  Visit was conducted, with patient's consent, to reduce the patient's risk of exposure to COVID-19 and provide continuity of care for an established patient. Services were provided through a video synchronous discussion virtually to substitute for in-person clinic visit.  nor

## 2021-10-07 NOTE — PATIENT INSTRUCTIONS
Dear Jessika Devlin.,     It was a pleasure seeing you today by virtual video visit.     This is the plan we talked about:    -Disease discussion  -You feel a lot better. The more time that goes by, the better you feel.  -You had some imaging a few weeks ago. Overall you think the results were okay. You said the new spots are too small to really worry about.  -You are planning a bone scan and CT scan in November.  -As long as things keep looking good, then you do not plan for any more chemo.     -Chronic low back pain from new L5 met status post ablation and kyphoplasty  -Your back is doing well. Any pain that you have is very mild in your back and hips. You use tylenol or aleve as needed if you do have pain, and this is sufficient for you at this time.  -You were able to wean off your opioid pills.     -Chemo-induced diarrhea  -You do still have a lot of water in your stool. You have a bowel movement about 2-3 times in the day and 3-4 times at night. This is a lot better than it was before starting the opium tincture. -You are taking opium tincture every 6 hours and this has been very helpful. You take Lomotil and Imodium along with this.  -You are now taking colestipol 2 hours after breakfast. You have been taking it for about 2 weeks now without much difference in the quality of your bowel movements, but you'll give it a try at least until the prescription is gone. -You continue to take Creon every time you eat. -You do have some lower abdominal cramping that you think is related to the colestipol because the cramping did not start until after you began taking the colestipol. Cramping is a known potential side effect of colestipol. We discussed that if the cramping is bothersome, then you could stop the colestipol for a few days to see if the cramping improves. -Sjogren's disease  -You are very careful to stay well hydrated.  You drink water and sugar-free gatorade or powerade.     -Multivitamin deficiency  -You were found to have severe vitamin A, vitamin D, vitamin E deficiency. You are on replacement therapy now. You continue to follow with your PCP about this. You had labs drawn in August and have had your vitamin supplementation adjusted due to this.    -Fatigue  -Sometimes you cannot do what you want to do because of the tiredness of your legs. You can get what you need to do done as long as you take breaks. -Appetite  -Your appetite has improved greatly since the last visit. You are taking boost or ensure every day. -Follow up  -We will see you back in 3 months. Please call if you need us sooner.  reviewed and appropriate. Most recent CT scan reviewed and results discussed with patient. See results below in data portion of note.

## 2021-10-07 NOTE — PROGRESS NOTES
Our radiology department does not know how to push the images over to [facility]. This RN asked for the discs to be burned and then we will get this sent via fed-ex.

## 2021-10-15 NOTE — TELEPHONE ENCOUNTER
Palliative Medicine  Nursing Note  [phone]  Fax [phone]      Telephone Call  Patient Name: Natasha Guidry. YOB: 1955    10/15/2021        Primary Decision Maker: Narciso Tang - Spouse - [phone]    Secondary Decision Maker: Ridge Cristina III - Child - [phone]    Supplemental (Other) Decision Maker: Olimpia Whelan Child - [phone]   Advance Care Planning 10/7/2021   Patient's [address] is: Named in scanned ACP document   Primary Decision Maker Name -   Primary Decision Maker Phone Number -   Primary Decision Maker Relationship to Patient -   Confirm Advance Directive Yes, on file   Patient Would Like to Complete Advance Directive -   Does the patient have other document types -     Returned call to patient and he reported uncontrolled pain in back - radiating to left side of pelvic bone - he rated pain 9/10 on pain scale. He reported taking Advil 400 mg every 6 hours, Dilaudid 4 mg - 2 tabs every 6 hours - last dose @ 3 pm, and Fentanyl patch 37.5 mcg at 10 am this morning. Wife reported that he went to the ER last night and was told that he was constipated and discharged home after given a Fentanyl shot and bowel regiment - per wife she think patient was misdiagnosed as he had 2 bowel movements since gotten home from the ER and pain is significantly worse. Wife reported that pain started 2 days ago and it is similar to the pain he experience with his bone mets. Advised I will discuss with Dr. Katt Patel and call back with recommendations.     Clarence Schneider RN  Palliative Medicine
Returned call to Eduar Jonnie advised per Dr. Araujo Him - patient should go back to the ER for additional evaluation as he went from no pain to pain 10/10 which is not being controlled on Dilaudid 8 mg which he has not taken in months.   Wife verbalized understanding
The patient was seen in the ER last night and is still having a lot of pain.  Please give him a call back [phone]
Rec'd in chair, NAD, +IV

## 2021-10-15 NOTE — TELEPHONE ENCOUNTER
Patient referred to ED tonight by palliative medicine MD for severe pain. Reason for Disposition   Caller has already spoken with the PCP and has no further questions.     Protocols used: NO CONTACT OR DUPLICATE CONTACT CALL-ADULT-

## 2021-10-15 NOTE — ED NOTES
I have reviewed discharge instructions with the patient. The patient verbalized understanding. Pt given discharge instructions and prescription sent to pharmacy. Pt encouraged to follow up with PCP and return to the ED if he develops worsening pain, fever, dehydration or any other new/worsening symptoms.

## 2021-10-15 NOTE — PROGRESS NOTES
Patient on report as eligible for Case Management. Pt was seen 10/15/21 at [facility] ER. Diagnosis Comment   Constipation, unspecified constipation type    Non-intractable vomiting with nausea, unspecified vomiting type    Abdominal pain, epigastric      Call placed to pt, Verified  and address for HIPAA security. Spoke briefly with pt. Pt reported having a considerable amount of LLF pain radiating to hip. Pt reported he had the pain yesterday when he went to the ER, no better. Pt reported having a BM X 4 since ER visit; both formed and diarrhea. Pt reported he vomit X 1 approx 2 hours ago. Advised pt to call palliative now to advise before the weekend and for a possible virtual appt this PM, agreed. Requested that pt call this CM back after he speaks with palliative, agrees. 5:37 PM - no return call from pt. Noted pt did call palliative and is currently back in the ER. Will update  Estrella Alcantara. who will be covering this CM while on PTO next week.

## 2021-10-15 NOTE — ED PROVIDER NOTES
EMERGENCY DEPARTMENT HISTORY AND PHYSICAL EXAM     ----------------------------------------------------------------------------  Please note that this dictation was completed with [employer], the computer voice recognition software. Quite often unanticipated grammatical, syntax, homophones, and other interpretive errors are inadvertently transcribed by the computer software. Please disregard these errors. Please excuse any errors that have escaped final proofreading  ----------------------------------------------------------------------------      Date: 10/15/2021  Patient Name: Damon Hennessy. History of Presenting Illness     Chief Complaint   Patient presents with    Abdominal Pain     pt arrives to the ED with mid upper ABD pain radiating to left upper back since 1800, pt states he has had N/V however denies D. pt states this feels like his pancreatitis       History Provided By:  Patient    HPI: Damon Hensley is a 73 y.o. male, with significant pmhx of previous pancreatic cancer status post Whipple, currently under the care of Dr. Jesse Garcia of hematology/oncology, who presents via private vehicle to the ED with c/o epigastric abdominal pain with associated nausea and vomiting that started around 6 PM yesterday evening while he was driving a car. Notes taking Advil at home without much relief of his symptoms. Notes epigastric pain that radiates through his back in a constant, aching type nature. Patient also specifically denies any associated fevers, chills, CP, SOB, urinary sxs, or headache. Social Hx: denies tobacco  denies EtOH , denies recreational/Illicit Drugs    There are no other complaints, changes, or physical findings at this time.      PCP: Elzie Holter III, DO    Allergies   Allergen Reactions    Shellfish Derived Anaphylaxis     Soft shell crabs    Morphine Nausea and Vomiting    Pcn [Penicillins] Other (comments)     Pt stated \"always been told PCN\"  Pt tolerated other cephalosporins in the past       Current Outpatient Medications   Medication Sig Dispense Refill    ondansetron (Zofran ODT) 4 mg disintegrating tablet Take 1 Tablet by mouth every eight (8) hours as needed for Nausea. 10 Tablet 0    polyethylene glycol (Miralax) 17 gram/dose powder Take 17 g by mouth two (2) times a day for 7 days. 1 tablespoon with 8 oz of water daily 1530 g 0    dicyclomine (BENTYL) 10 mg capsule Take 1 Capsule by mouth three (3) times daily as needed for Abdominal Cramps. 10 Capsule 0    Creon 36,000-114,000- 180,000 unit cpDR capsule TAKE TWO TO THREE CAPSULES BY MOUTH WITH EACH MEAL AND ONE CAPSULE FOR SNACKS DAILY 1080 Capsule 3    colestipoL (COLESTID) 1 gram tablet       opium tincture 10 mg/mL (morphine) liquid Take 1 mL by mouth every six (6) hours as needed for Diarrhea for up to 30 days. Max Daily Amount: 4 mL. 118 mL 0    cyanocobalamin (VITAMIN B12) 1,000 mcg/mL injection 1 mL by IntraMUSCular route every seven (7) days. 6 mL 6    calcium carbonate (CALCIUM 500 PO) Take  by mouth.  azelastine (ASTEPRO) 205.5 mcg (0.15 %) 2 Sprays by Both Nostrils route two (2) times a day. 1 Bottle 0    vitamin A (AQUASOL A) 10,000 unit capsule TAKE 1 CAPSULE BY MOUTH ONE TIME A DAY 90 Capsule 3    OLANZapine (ZyPREXA) 10 mg tablet Take 1 Tab by mouth See Admin Instructions. Take nightly for 4 days starting the evening of chemotherapy. 20 Tab 1    pantoprazole (PROTONIX) 40 mg tablet TAKE 1 TABLET BY MOUTH EVERY DAY 30 Tab 0    lidocaine-prilocaine (EMLA) topical cream Apply  to affected area as needed for Pain. 30-45 min prior to treatments 30 g 3    dexAMETHasone (DECADRON) 1 mg tablet Take 2 tablets by mouth twice daily for 14 days 56 Tab 0    dutasteride (AVODART) 0.5 mg capsule Take 1 Cap by mouth daily. 90 Cap 3    naloxone (NARCAN) 4 mg/actuation nasal spray Use 1 spray intranasally, then discard.  Repeat with new spray every 2 min as needed for opioid overdose symptoms, alternating nostrils. 1 Each 0    aluminum & magnesium hydroxide-simethicone (Maalox Maximum Strength) 400-400-40 mg/5 mL suspension Take 10 mL by mouth every six (6) hours as needed for Indigestion.  vitamin E (AQUA GEMS) 400 unit capsule Take 1 Cap by mouth daily. 90 Cap 3    empagliflozin (Jardiance) 25 mg tablet Take 1 Tab by mouth nightly. 90 Tab 3    gabapentin (NEURONTIN) 100 mg capsule Take 3 capsules by mouth twice a day. 540 Cap 3    tamsulosin (FLOMAX) 0.4 mg capsule TAKE ONE CAPSULE BY MOUTH EVERY EVENING 90 Cap 3    ramipriL (ALTACE) 5 mg capsule Take 1 Cap by mouth daily. (Patient taking differently: Take 5 mg by mouth nightly.) 90 Cap 3    lidocaine (LIDODERM) 5 % 1 Patch by TransDERmal route every twenty-four (24) hours. Apply patch to the affected area for 12 hours a day and remove for 12 hours a day. (Patient not taking: Reported on 10/7/2021) 30 Each 5    loperamide (IMODIUM) 2 mg capsule Take 2-4 mg by mouth as needed for Diarrhea. Give 4 mg after first loose stool. Give an additional 2 mg after each loose stool as needed up to a maximum of 8 doses (16 mg/day).  finasteride (PROSCAR) 5 mg tablet TAKE 1 TABLET BY MOUTH EVERY DAY      acetaminophen (TYLENOL) 325 mg tablet Take 2 Tabs by mouth every four (4) hours as needed for Pain or Fever (Over the counter medication). 1 Tab 0    alpha-D-galactosidase (BEANO PO) Take 1 Tab by mouth two (2) times a day.  cetirizine (ZYRTEC) 10 mg tablet Take 10 mg by mouth nightly. Past History     Past Medical History:  Past Medical History:   Diagnosis Date    Aneurysm ([address] 2008    CEREBRAL    Arrhythmia     Previous a.fib, ablation, NSR now; NO LONGER FOLLOWED BY CARDIOLOGIST.     Autoimmune disease ([address]     SJOGREN'S    Cancer ([facility] 2020    COMMON BILE DUCT, ADENOCARCINOMA, SPINE    Diabetes ([address]     NIDDM    Family history of skin cancer     Hypertension     Sepsis ([address] 2017    STENTING TO BILE DUCT    Status post chemotherapy     Stroke [facility]) 2008    brain aneurysm - no deficits    Sun-damaged skin     Sunburn, blistering        Past Surgical History:  Past Surgical History:   Procedure Laterality Date    HX CHOLECYSTECTOMY      HX GI      COLONOSCOPY, POLYPS (BENIGN)    HX GI  10/06/2017    [facility]     HX GI  2020    WHIPPLE REVISION    HX HEART CATHETERIZATION  2005    Ablation     HX HERNIA REPAIR  12/2020    HERNIA REPAIR    HX ORTHOPAEDIC  2000    Spinal fusion W/ HARDWARE    HX OTHER SURGICAL  11/07/2017    [place] Cath, Dr. Teresa Patten HX OTHER SURGICAL  01/11/2021    RIGHT IJ PORT-A-CATH PLACEMENT     HX VASCULAR ACCESS  2017    PORTACATH - then removed    IR KYPHOPLASTY LUMBAR  1/12/2021    NEUROLOGICAL PROCEDURE UNLISTED  2005    Ting hole washout from cerebral hemorrhage    [place] ABDOMEN SURGERY PROC UNLISTED  10/2017    APRIL       Family History:  Family History   Problem Relation Age of Onset    Cancer Father         Breast and Colon, MELANOMA    Hypertension Father     Cancer Mother         LEUKEMIA    No Known Problems Sister     Atrial Fibrillation Brother     No Known Problems Sister     No Known Problems Son     No Known Problems Son     Anesth Problems Neg Hx        Social History:  Social History     Tobacco Use    Smoking status: Never Smoker    Smokeless tobacco: Never Used   Vaping Use    Vaping Use: Never used   Substance Use Topics    Alcohol use: Never     Comment: very rare    Drug use: No       Allergies: Allergies   Allergen Reactions    Shellfish Derived Anaphylaxis     Soft shell crabs    Morphine Nausea and Vomiting    Pcn [Penicillins] Other (comments)     Pt stated \"always been told PCN\"  Pt tolerated other cephalosporins in the past         Review of Systems   Review of Systems   Constitutional: Negative for chills and fever. HENT: Negative. Eyes: Negative.     Respiratory: Negative for cough, chest tightness and shortness of breath. Cardiovascular: Negative for chest pain and leg swelling. Gastrointestinal: Positive for abdominal pain, nausea and vomiting. Negative for diarrhea. Endocrine: Negative. Genitourinary: Negative for difficulty urinating and dysuria. Musculoskeletal: Negative for myalgias. Skin: Negative. Neurological: Negative. Psychiatric/Behavioral: Negative. All other systems reviewed and are negative. Physical Exam   Physical Exam  Vitals and nursing note reviewed. Constitutional:       General: He is not in acute distress. Appearance: He is well-developed. He is not diaphoretic. HENT:      Head: Normocephalic and atraumatic. Nose: Nose normal.      Mouth/Throat:      Pharynx: No oropharyngeal exudate. Eyes:      Conjunctiva/sclera: Conjunctivae normal.      Pupils: Pupils are equal, round, and reactive to light. Neck:      Vascular: No JVD. Cardiovascular:      Rate and Rhythm: Normal rate and regular rhythm. Heart sounds: Normal heart sounds. No murmur heard. No friction rub. Pulmonary:      Effort: Pulmonary effort is normal. No respiratory distress. Breath sounds: Normal breath sounds. No stridor. No wheezing or rales. Abdominal:      General: Bowel sounds are normal. There is no distension. Palpations: Abdomen is soft. Tenderness: There is abdominal tenderness in the epigastric area. There is no rebound. Musculoskeletal:         General: No tenderness. Normal range of motion. Cervical back: Normal range of motion and neck supple. Skin:     General: Skin is warm and dry. Findings: No rash. Neurological:      Mental Status: He is alert and oriented to person, place, and time. Cranial Nerves: No cranial nerve deficit. Psychiatric:         Speech: Speech normal.         Behavior: Behavior normal.         Thought Content:  Thought content normal.         Judgment: Judgment normal.           Diagnostic Study Results     Labs -     Recent Results (from the past 12 hour(s))   CBC WITH AUTOMATED DIFF    Collection Time: 10/15/21 12:28 AM   Result Value Ref Range    WBC 4.8 4.1 - 11.1 K/uL    RBC 4.29 4. 10 - 5.70 M/uL    HGB 12.6 12.1 - 17.0 g/dL    HCT 39.0 36.6 - 50.3 %    MCV 90.9 80.0 - 99.0 FL    MCH 29.4 26.0 - 34.0 PG    MCHC 32.3 30.0 - 36.5 g/dL    RDW 14.8 (H) 11.5 - 14.5 %    PLATELET 63 (L) 711 - 400 K/uL    MPV 10.8 8.9 - 12.9 FL    NRBC 0.0 0  WBC    ABSOLUTE NRBC 0.00 0.00 - 0.01 K/uL    NEUTROPHILS 78 (H) 32 - 75 %    LYMPHOCYTES 9 (L) 12 - 49 %    MONOCYTES 8 5 - 13 %    EOSINOPHILS 5 0 - 7 %    BASOPHILS 0 0 - 1 %    IMMATURE GRANULOCYTES 0 0.0 - 0.5 %    ABS. NEUTROPHILS 3.8 1.8 - 8.0 K/UL    ABS. LYMPHOCYTES 0.4 (L) 0.8 - 3.5 K/UL    ABS. MONOCYTES 0.4 0.0 - 1.0 K/UL    ABS. EOSINOPHILS 0.2 0.0 - 0.4 K/UL    ABS. BASOPHILS 0.0 0.0 - 0.1 K/UL    ABS. IMM. GRANS. 0.0 0.00 - 0.04 K/UL    DF SMEAR SCANNED      RBC COMMENTS NORMOCYTIC, NORMOCHROMIC     METABOLIC PANEL, COMPREHENSIVE    Collection Time: 10/15/21 12:28 AM   Result Value Ref Range    Sodium 138 136 - 145 mmol/L    Potassium 4.0 3.5 - 5.1 mmol/L    Chloride 108 97 - 108 mmol/L    CO2 27 21 - 32 mmol/L    Anion gap 3 (L) 5 - 15 mmol/L    Glucose 184 (H) 65 - 100 mg/dL    BUN 18 6 - 20 MG/DL    Creatinine 0.70 0.70 - 1.30 MG/DL    BUN/Creatinine ratio 26 (H) 12 - 20      GFR est AA >60 >60 ml/min/1.73m2    GFR est non-AA >60 >60 ml/min/1.73m2    Calcium 8.9 8.5 - 10.1 MG/DL    Bilirubin, total 0.8 0.2 - 1.0 MG/DL    ALT (SGPT) 61 12 - 78 U/L    AST (SGOT) 74 (H) 15 - 37 U/L    Alk. phosphatase 200 (H) 45 - 117 U/L    Protein, total 6.6 6.4 - 8.2 g/dL    Albumin 3.6 3.5 - 5.0 g/dL    Globulin 3.0 2.0 - 4.0 g/dL    A-G Ratio 1.2 1.1 - 2.2     LIPASE    Collection Time: 10/15/21 12:28 AM   Result Value Ref Range    Lipase 95 73 - 393 U/L       Radiologic Studies -   CT ABD PELV W CONT   Final Result   No acute intraperitoneal process.    Fecal stasis is moderate to severe. Stable pulmonary nodules. Status post Whipple procedure. Confluent density around the aorta and celiac axis without significant change. Confluent fluid density around the left renal hilum unchanged. CT Results  (Last 48 hours)               10/15/21 0237  CT ABD PELV W CONT Final result    Impression:  No acute intraperitoneal process. Fecal stasis is moderate to severe. Stable pulmonary nodules. Status post Whipple procedure. Confluent density around the aorta and celiac axis without significant change. Confluent fluid density around the left renal hilum unchanged. Narrative:  EXAM: CT ABD PELV W CONT   Clinical history: Epigastric pain   INDICATION: epigastric pain, h/o pancreatic cancer s/p whipple   Cholangiocarcinoma       COMPARISON: 8/22/2021       CONTRAST: 100 mL of Isovue-370. TECHNIQUE:    Following the uneventful intravenous administration of contrast, thin axial   images were obtained through the abdomen and pelvis. Coronal and sagittal   reconstructions were generated. Oral contrast was not administered. CT dose   reduction was achieved through use of a standardized protocol tailored for this   examination and automatic exposure control for dose modulation. FINDINGS:        CHEST :Tiny nodular densities are stable to slightly enlarged. MEDIASTINUM: No mass or lymphadenopathy. LIVER: No focal mass lesion. . Mild intrahepatic ductal dilatation. GALLBLADDER: Absent   BILIARY TREE: Pneumobilia   SPLEEN: Splenomegaly. Spleen measures 13.6 cm   PANCREAS: Status post Whipple procedure. ADRENALS: Unremarkable. KIDNEYS: Confluent fluid density around the left renal hilum with irregularity   of the vasculature without significant change. Left renal cyst as well. No right   renal abnormality.  High density material in the renal collecting system on the   left   STOMACH: Status post Whipple procedure   SMALL BOWEL: Status post Whipple procedure   COLON: Colonic diverticulosis. Fecal stasis is moderate to severe. APPENDIX: Unremarkable   PERITONEUM: No ascites or pneumoperitoneum. RETROPERITONEUM: Confluent soft tissue density around the aorta and celiac axis   as well as SMA without significant change in size measuring approximately 4.1 x   5.4 cm   REPRODUCTIVE ORGANS: Unremarkable   URINARY BLADDER: No mass or calculus. BONES: Remote left rib deformity. No lytic or blastic lesions. Spondylolisthesis   and kyphoplasty at L5/S1. ADDITIONAL COMMENTS: N/A               CXR Results  (Last 48 hours)    None            Medical Decision Making   I am the first provider for this patient. I reviewed the vital signs, available nursing notes, past medical history, past surgical history, family history and social history. Vital Signs-Reviewed the patient's vital signs. Patient Vitals for the past 12 hrs:   Temp Pulse Resp BP SpO2   10/15/21 0306  63 16 133/75 98 %   10/15/21 0126  62  (!) 143/86 98 %   10/15/21 0053  64 20 138/86 96 %   10/15/21 0009 97.6 °F (36.4 °C) 64 18 (!) 150/80 100 %       Pulse Oximetry Analysis - 98% on RA, normal  Rate: 62 bpm  Rhythm: nsr      Provider Notes (Medical Decision Making):     DDX:  Pancreatitis flare, electrolyte derangement, dehydration, obstruction    Plan:  Labs, antiemetic, IV fluids, analgesic    Impression:  Constipation, n/v    ED Course:   Initial assessment performed. The patients presenting problems have been discussed, and they are in agreement with the care plan formulated and outlined with them. I have encouraged them to ask questions as they arise throughout their visit.     I reviewed the nursing notes and and vital signs from today's visit, as well as the electronic medical record system for any past medical records that were available that may contribute to the patients current condition, including previous evaluation for functional diarrhea    Nursing notes will be reviewed as they become available in realtime while the pt has been in the ED. Marita Rod MD      I personally reviewed/interpreted pt's imaging. Agree with official read by radiology as noted above. Marita Rod MD    3:31 AM  Progress note:  Pt noted to be feeling better, no longer vomiting, resting comfortably on stretcher, ready for discharge. Discussed lab and imaging findings with pt, specifically noting constipation/fecal stasis. Pt will follow up with hematology/oncology and palliative care as instructed. All questions have been answered, pt voiced understanding and agreement with plan. Specific return precautions provided in addition to instructions for pt to return to the ED immediately should sx worsen at any time. Marita Rod MD           Critical Care Time:     none      Diagnosis     Clinical Impression:   1. Constipation, unspecified constipation type    2. Non-intractable vomiting with nausea, unspecified vomiting type    3. Abdominal pain, epigastric        PLAN:  1. Current Discharge Medication List      START taking these medications    Details   ondansetron (Zofran ODT) 4 mg disintegrating tablet Take 1 Tablet by mouth every eight (8) hours as needed for Nausea. Qty: 10 Tablet, Refills: 0  Start date: 10/15/2021      polyethylene glycol (Miralax) 17 gram/dose powder Take 17 g by mouth two (2) times a day for 7 days. 1 tablespoon with 8 oz of water daily  Qty: 1530 g, Refills: 0  Start date: 10/15/2021, End date: 10/22/2021      dicyclomine (BENTYL) 10 mg capsule Take 1 Capsule by mouth three (3) times daily as needed for Abdominal Cramps. Qty: 10 Capsule, Refills: 0  Start date: 10/15/2021           2.    Follow-up Information     Follow up With Specialties Details Why Alessandra Higuera, Narendra Andino DO Internal Medicine Schedule an appointment as soon as possible for a visit in 2 days  [address] [facility]  [phone] Return to ED if worse     Disposition:  3:32 AM  The patient's results have been reviewed with family and/or caregiver. They verbally convey their understanding and agreement of the patient's signs, symptoms, diagnosis, treatment and prognosis and additionally agree to follow up as recommended in the discharge instructions or to return to the Emergency Room should the patient's condition change prior to their follow-up appointment. The family and/or caregiver verbally agrees with the care-plan and all of their questions have been answered. The discharge instructions have also been provided to the them with educational information regarding the patient's diagnosis as well a list of reasons why the patient would want to return to the ER prior to their follow-up appointment should their condition change.   Esmer Borjas MD

## 2021-10-15 NOTE — DISCHARGE INSTRUCTIONS
It was a pleasure taking care of you in our Emergency Department today. We know that when you come to [facility], you are entrusting us with your health, comfort, and safety. Our physicians and nurses honor that trust, and truly appreciate the opportunity to care for you and your loved ones. We also value your feedback. If you receive a survey about your Emergency Department experience today, please fill it out. We care about our patients' feedback, and we listen to what you have to say. Thank you!       Dr. Alex Jimenez MD.

## 2021-10-15 NOTE — Clinical Note
[address]  [facility] EMERGENCY DEPT  [address] [place] [id number]  [phone]    Work/School Note    Date: 10/15/2021    To Whom It May concern:      Renu Serra was seen and treated today in the emergency room by the following provider(s):  Attending Provider: Holly Hoover MD.      Renu Serra is excused from work/school on 10/15/21. He is clear to return to work/school on 10/16/21.         Sincerely,          Jose Horne MD

## 2021-10-18 NOTE — Clinical Note
Rocky Skelton. I was asked to reach out to this patient while you were out, post ED visit. He was not in a care management episode, so I did put him in [facility]. Please let me know if you would like to take him back, as I know you have worked with him recently and he is familiar with you. Thanks!

## 2021-10-18 NOTE — PROGRESS NOTES
HPRR progress note    Patient eligible for [facility] 994 care management  Discussed the care management program.  Patient agrees to care management services at this time. PMH:   Past Medical History:   Diagnosis Date    Aneurysm ([facility] 2008    CEREBRAL    Arrhythmia     Previous a.fib, ablation, NSR now; NO LONGER FOLLOWED BY CARDIOLOGIST.  Autoimmune disease ([facility]     SJOGREN'S    Cancer ([facility] 2020    COMMON BILE DUCT, ADENOCARCINOMA, SPINE    Diabetes (HCC)     NIDDM    Family history of skin cancer     Hypertension     Sepsis ([facility] 2017    STENTING TO BILE DUCT    Status post chemotherapy     Stroke [facility]) 2008    brain aneurysm - no deficits    Sun-damaged skin     Sunburn, blistering        Social History:   Social History     Socioeconomic History    Marital status:      Spouse name: Not on file    Number of children: Not on file    Years of education: Not on file    Highest education level: Not on file   Occupational History    Not on file   Tobacco Use    Smoking status: Never Smoker    Smokeless tobacco: Never Used   Vaping Use    Vaping Use: Never used   Substance and Sexual Activity    Alcohol use: Never     Comment: very rare    Drug use: No    Sexual activity: Yes     Partners: Female   Other Topics Concern    Not on file   Social History Narrative    Not on file     Social Determinants of Health     Financial Resource Strain:     Difficulty of Paying Living Expenses:    Food Insecurity:     Worried About Running Out of Food in the Last Year:     Ran Out of Food in the Last Year:    Transportation Needs:     Lack of Transportation (Medical):      Lack of Transportation (Non-Medical):    Physical Activity:     Days of Exercise per Week:     Minutes of Exercise per Session:    Stress:     Feeling of Stress :    Social Connections:     Frequency of Communication with Friends and Family:     Frequency of Social Gatherings with Friends and Family:     Attends Confucianism Services:     Active Member of Clubs or Organizations:     Attends Club or Organization Meetings:     Marital Status:    Intimate Partner Violence:     Fear of Current or Ex-Partner:     Emotionally Abused:     Physically Abused:     Sexually Abused:          Patient's primary care provider relationship reviewed with patient and modified, as applicable. Care management assessment completed:    Patient voiced that he went to the ED related to severe pain in lower back. He stated that he pulled a muscle and is controlling the pain with Motrin, when taking the Motrin the pain is at a 1-2. Patient will consider physical therapy as well. Patient is no longer experiencing constipation, states that he has been having a bowel movement 3-4 times a day. He has some formed stools and some that are watery. Encouraged the patient to watch for signs of dehydration and drink liquids to replace the loose bowel movements. Encouraged patient to continue the use of Motrin and ice, as well as positioning for comfort. Medications:  New Medications at Discharge: None  Changed Medications at Discharge: None  Discontinued Medications at Discharge: None  Current Outpatient Medications   Medication Sig    ondansetron (Zofran ODT) 4 mg disintegrating tablet Take 1 Tablet by mouth every eight (8) hours as needed for Nausea.  polyethylene glycol (Miralax) 17 gram/dose powder Take 17 g by mouth two (2) times a day for 7 days. 1 tablespoon with 8 oz of water daily    dicyclomine (BENTYL) 10 mg capsule Take 1 Capsule by mouth three (3) times daily as needed for Abdominal Cramps.  Creon 36,000-114,000- 180,000 unit cpDR capsule TAKE TWO TO THREE CAPSULES BY MOUTH WITH EACH MEAL AND ONE CAPSULE FOR SNACKS DAILY    colestipoL (COLESTID) 1 gram tablet     opium tincture 10 mg/mL (morphine) liquid Take 1 mL by mouth every six (6) hours as needed for Diarrhea for up to 30 days. Max Daily Amount: 4 mL.     cyanocobalamin (VITAMIN B12) 1,000 mcg/mL injection 1 mL by IntraMUSCular route every seven (7) days.  calcium carbonate (CALCIUM 500 PO) Take  by mouth.  azelastine (ASTEPRO) 205.5 mcg (0.15 %) 2 Sprays by Both Nostrils route two (2) times a day.  vitamin A (AQUASOL A) 10,000 unit capsule TAKE 1 CAPSULE BY MOUTH ONE TIME A DAY    OLANZapine (ZyPREXA) 10 mg tablet Take 1 Tab by mouth See Admin Instructions. Take nightly for 4 days starting the evening of chemotherapy.  pantoprazole (PROTONIX) 40 mg tablet TAKE 1 TABLET BY MOUTH EVERY DAY    lidocaine-prilocaine (EMLA) topical cream Apply  to affected area as needed for Pain. 30-45 min prior to treatments    dexAMETHasone (DECADRON) 1 mg tablet Take 2 tablets by mouth twice daily for 14 days    dutasteride (AVODART) 0.5 mg capsule Take 1 Cap by mouth daily.  naloxone (NARCAN) 4 mg/actuation nasal spray Use 1 spray intranasally, then discard. Repeat with new spray every 2 min as needed for opioid overdose symptoms, alternating nostrils.  aluminum & magnesium hydroxide-simethicone (Maalox Maximum Strength) 400-400-40 mg/5 mL suspension Take 10 mL by mouth every six (6) hours as needed for Indigestion.  vitamin E (AQUA GEMS) 400 unit capsule Take 1 Cap by mouth daily.  empagliflozin (Jardiance) 25 mg tablet Take 1 Tab by mouth nightly.  gabapentin (NEURONTIN) 100 mg capsule Take 3 capsules by mouth twice a day.  tamsulosin (FLOMAX) 0.4 mg capsule TAKE ONE CAPSULE BY MOUTH EVERY EVENING    ramipriL (ALTACE) 5 mg capsule Take 1 Cap by mouth daily. (Patient taking differently: Take 5 mg by mouth nightly.)    lidocaine (LIDODERM) 5 % 1 Patch by TransDERmal route every twenty-four (24) hours. Apply patch to the affected area for 12 hours a day and remove for 12 hours a day. (Patient not taking: Reported on 10/7/2021)    loperamide (IMODIUM) 2 mg capsule Take 2-4 mg by mouth as needed for Diarrhea. Give 4 mg after first loose stool.   Give an additional 2 mg after each loose stool as needed up to a maximum of 8 doses (16 mg/day).  finasteride (PROSCAR) 5 mg tablet TAKE 1 TABLET BY MOUTH EVERY DAY    acetaminophen (TYLENOL) 325 mg tablet Take 2 Tabs by mouth every four (4) hours as needed for Pain or Fever (Over the counter medication).  alpha-D-galactosidase (BEANO PO) Take 1 Tab by mouth two (2) times a day.  cetirizine (ZYRTEC) 10 mg tablet Take 10 mg by mouth nightly. No current facility-administered medications for this visit. There are no discontinued medications. Performed medication reconciliation with patient, and patient verbalizes understanding of administration of home medications. There were no barriers to obtaining medications identified at this time. Preventive Care     Health Maintenance   Topic Date Due    Eye Exam Retinal or Dilated  Never done    Foot Exam Q1  05/30/2020    Flu Vaccine (1) 09/01/2021    MICROALBUMIN Q1  10/15/2021    Medicare Yearly Exam  10/15/2021    Pneumococcal 65+ yrs at Risk Vaccine (2 of 2 - PPSV23) 01/04/2022    A1C test (Diabetic or Prediabetic)  08/06/2022    Lipid Screen  08/06/2022    Colorectal Cancer Screening Combo  06/07/2023    DTaP/Tdap/Td series (3 - Td or Tdap) 12/02/2025    Hepatitis C Screening  Completed    Shingrix Vaccine Age 50>  Completed    COVID-19 Vaccine  Completed           Goals   Goals      Coordinate Pain Management Plan for Patient. Patient will adhere to pain management plan put in place by ED physician. Will discuss PT with PCP.               Barriers/Support system:  patient and spouse      Barriers/Challenges to Care: []  Decline in memory    []  Language barrier     []  Emotional                  []  Limited mobility  []  Lack of motivation     [] Vision, hearing or cognitive impairment []  Knowledge [] Financial Barriers []  Lack of support  [x]  Pain []  Other     PCP/Specialist follow up:   Future Appointments   Date Time Provider Sloane Roach   10/21/2021  3:00 PM PEMBERTON FASTRACK 5 [facility] REG   11/10/2021  8:00 AM Tiffanie Cochran III [facility] BS AMB   11/18/2021  3:00 PM PEMBERTON FASTRACK 1 [facility] REG   11/24/2021 10:15 AM Naa Rogel MD ONC BS AMB   12/16/2021  3:00 PM PEMBERTON FASTRACK 1 [facility] REG   12/30/2021  9:30 AM Jon Isabel MD [facility] BS AMB      Reviewed red flags with patient, and patient verbalizes understanding. Patient given an opportunity to ask questions. No other clinical/social/functional needs noted. The patient agrees to contact the PCP office for questions related to their healthcare. The patient expressed thanks, offered no additional questions and ended the call. Plan for next call:  Discuss symptom management, follow up regarding lower back pain.

## 2021-10-21 NOTE — PROGRESS NOTES
8000 [facility] Visit Note    7858 Pt arrived at [facility] ambulatory and in no distress for Xgeva. Assessment unremarkable except fatigue, diarrhea, and numbness/tingling in hands and feet. Pt denies any recent or upcoming dental work. Patient denied having any symptoms of COVID-19, i.e. SOB, coughing, fever, or generally not feeling well. Also denies having been exposed to COVID-19 recently or having had any recent contact with family/friend that has a pending COVID test.     Visit Vitals  /68 (BP 1 Location: Left upper arm, BP Patient Position: Sitting)   Pulse 76   Temp 98.1 °F (36.7 °C)   Resp 18   Wt 69.8 kg (153 lb 14.4 oz)   SpO2 98%   BMI 23.40 kg/m²     Labs drawn- Chem 8, Magnesium, and Phosphorus    Recent Results (from the past 12 hour(s))   POC CHEM8    Collection Time: 10/21/21  3:15 PM   Result Value Ref Range    Calcium, ionized (POC) 1.20 1. 12 - 1.32 mmol/L    Sodium (POC) 141 136 - 145 mmol/L    Potassium (POC) 3.6 3.5 - 5.1 mmol/L    Chloride (POC) 106 98 - 107 mmol/L    CO2 (POC) 23.8 21 - 32 mmol/L    Anion gap (POC) 12 10 - 20 mmol/L    Glucose (POC) 239 (H) 65 - 100 mg/dL    Creatinine (POC) 0.72 0.6 - 1.3 mg/dL    GFRAA, POC >60 >60 ml/min/1.73m2    GFRNA, POC >60 >60 ml/min/1.73m2    Comment Comment Not Indicated. MAGNESIUM    Collection Time: 10/21/21  3:18 PM   Result Value Ref Range    Magnesium 2.6 (H) 1.6 - 2.4 mg/dL   PHOSPHORUS    Collection Time: 10/21/21  3:18 PM   Result Value Ref Range    Phosphorus 3.1 2.6 - 4.7 MG/DL   Ionized Calcium 1.20    Medications received:  Xgeva 120 mg SQ in left arm    1535 Tolerated treatment well, no adverse reaction noted. D/Cd from [facility] ambulatory and in no distress accompanied by self. Next appt 11/18/21 @ 1500.

## 2021-10-26 NOTE — PROGRESS NOTES
[facility] Outreach     Call placed to pt, Verified  and address for HIPAA security. Chart review:    Diagnosis:      1. 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 Extrahepatic cholangiocarcinoma:  T3 N1 (1 of 12 LN +ve)  Invasion into pancreas  R0 resection     Treatment:      1. S/P Pancreatoduodenectomy on  10/06/2017  2. Adjuvant monotherapy with Capecitabine - s/p 6 cycles              (2017 - 2018)  3. SBRT RP node/soft tissue 2020  4. Palliative chemotherapy              Cis/[place]/Abraxane - s/p 6 cycles - discontinued d/t progression of disease      Purpose of TC    F/U regarding ER visit on 10/15/21 X 2    Diagnosis Comment   Constipation, unspecified constipation type    Non-intractable vomiting with nausea, unspecified vomiting type    Abdominal pain, epigastric      Back Pain - LWBS after triage     TC details    Pt reported he is feeling much better. Back pain is 1/10 on pain scale. Denies N/V or temp. Reported diarrhea 3-4 times today, which is pt's base line. Oncology F/U - 21    Plan of action  Provide support to patient. Goals       Attends follow-up appointments as directed. PCP - 11/10/21  Oncology - 21  Infusion - 21  Palliative - 21         Care Coordination related to Extrahepatic cholangiocarcinoma with metastasis to the lung (pt-stated)       Coordinate Pain Management Plan for Patient. Patient will adhere to pain management plan put in place by ED physician. Will discuss PT with PCP.   Supportive resources in place to maintain patient in the community (ie.  Home Health, DME equipment, refer to, medication assistant plan, etc.)       [facility] - # [phone]   Nurse Access line   Be Well  [address] [employer]weet [facility] 97 Urgent [facility] [phone]  HR Service Now - [facility]  BS Workday  IT - [phone]  [facility] 1- [phone]  Associate Services for advice and direction, by calling [phone] and pressing 1 or Absence. [email or web address]. [employer]  [employer] - [phone] Go to [employer] on the Internet or your mobile device and enter the password [email or web address] to access resources, educational information, and self-service options.

## 2021-10-28 NOTE — TELEPHONE ENCOUNTER
Fernanda Mcdaniel is calling to advise nurse that they do not have the opium tincture in stock and will not get until Monday 11/1/2021. Please call back to advise.

## 2021-10-28 NOTE — TELEPHONE ENCOUNTER
The patient called and asked if Dr. Rajat Maldonado could call in a  Prescription for pain medication. The patient uses the [facility] [address]

## 2021-10-28 NOTE — TELEPHONE ENCOUNTER
Returned call to patient and he stated that [facility], due to increase back pain and was prescribed Methylprednisolone 4 mg taper and Robaxin 500 mg - 2 tabs four times daily and advised if no pain relief by today to contact us for alternative pain medication. Patient reported left side, back pain returned 2 weeks ago, and worsening daily. He reported pain today 10/10 and last dose of above medications were taken at 12:30 pm with no relief. Patient reported that he is scheduled for bone scan and visit with oncologist mid-late November but would like something for bone pain as soon as possible. Advised I will discuss with MD and get back with him with recommendations - patient verbalized understanding.

## 2021-10-28 NOTE — TELEPHONE ENCOUNTER
Triage for Controlled Substance Refill Request     Pain Diagnosis: _Cholangiocarcinoma of biliary tract ([facility] (C22.1)     Last Outpatient Visit: 10/7/2021     Next Outpatient Visit: 12/30/2021     Reason for refill needed outside of office visit? -Appointment not scheduled prior to need for scheduled refill        Pharmacy: _[facility] [address]     Medication:opium tincture 10 mg/mL (morphine) liquid        Dose and directions: Take 1 mL by mouth every six (6) hours   Number dispensed:118ml  Date filled ( or Pharmacy):10/1/2021  # left:            reviewed: _yes      Date of Urine Drug Screen:  _n/a     Opioid Safety Handout given:  _yes     Appropriate for refill:  _yes     Action:  _ please refill

## 2021-10-28 NOTE — TELEPHONE ENCOUNTER
The patient is requesting refills for Lomotil 2.5mg and Opium 10mg. To be called in to [facility]. He saw his doctor yesterday and she prescribed steroid and muscle relaxer. They are not working. He needs something for pain in his back.

## 2021-10-29 NOTE — TELEPHONE ENCOUNTER
Returned call to patient he reported that he is still taking Robaxin    and Prednisone - he reported back pain today 7/10. Advised per MD to continue taking medication as prescribed by the urgent care and this nurse will call on Monday to follow up on pain. Patient verbalized understanding    MD Elba Aparicio RN  Caller: Unspecified [place],  2:05 PM)  Patient has used opioids in the past for his cancer related pain. I do not know if his current pain is cancer related. I would recommend continuing treatment plan as advised by urgent care. Please call for more information regarding his pain and to ask if it is consistent with his previous cancer related pain. If so, then we could consider resuming oxycodone at a lower dose and having him follow up in our clinic soon.

## 2021-11-01 NOTE — ED NOTES
MD Juliette Gitelman reviewed discharge instructions with the patient and spouse. The patient and spouse verbalized understanding. Patient ambulatory to vehicle with transportation provided by wife. Per chart review, patient scheduled on 11/1/21.

## 2021-11-01 NOTE — PROGRESS NOTES
VORB FROM DR Maryana Baker CT CHEST ABD PELV W CONTRAST. PER PARESH FROM DR Maryana Baker NM BONE SCAN [address] BODY as a one time order. OBTAIN LABS IN [facility] within the next few days.

## 2021-11-02 NOTE — PROGRESS NOTES
[facility] Note:  Add-on patient arrived - [id number]    Patient denied having any symptoms of COVID-19, i.e. SOB, coughing, fever, or generally not feeling well. Also denies having been exposed to COVID-19 recently or having had any recent contact with family/friend that has a pending COVID test.     Visit Vitals  /70 (BP 1 Location: Left upper arm, BP Patient Position: Sitting)   Pulse 82   Temp 98.1 °F (36.7 °C)   Resp 18   Wt 70.4 kg (155 lb 1.6 oz)   SpO2 99%   BMI 23.58 kg/m²       Port accessed, labs obtained per orders (CBC with diff, CMP, Magnesium, and Phosphorus) & flushed per protocol w/o difficulty. Kovacs needle removed. 1115 - Tolerated well. Pt denies any acute problems/changes. Discharged from [facility] ambulatory. No distress. Next appt: 11/18/21 @ 1500. See [facility] for pending labs.

## 2021-11-10 NOTE — PATIENT INSTRUCTIONS
Medicare Wellness Visit, Male    The best way to live healthy is to have a lifestyle where you eat a well-balanced diet, exercise regularly, limit alcohol use, and quit all forms of tobacco/nicotine, if applicable. Regular preventive services are another way to keep healthy. Preventive services (vaccines, screening tests, monitoring & exams) can help personalize your care plan, which helps you manage your own care. Screening tests can find health problems at the earliest stages, when they are easiest to treat. [facility] follows the current, evidence-based guidelines published by the [facility] ([facility]) when recommending preventive services for our patients. Because we follow these guidelines, sometimes recommendations change over time as research supports it. (For example, a prostate screening blood test is no longer routinely recommended for men with no symptoms). Of course, you and your doctor may decide to screen more often for some diseases, based on your risk and co-morbidities (chronic disease you are already diagnosed with). Preventive services for you include:  - Medicare offers their members a free annual wellness visit, which is time for you and your primary care provider to discuss and plan for your preventive service needs. Take advantage of this benefit every year!  -All adults over age 72 should receive the recommended pneumonia vaccines. Current USPSTF guidelines recommend a series of two vaccines for the best pneumonia protection.   -All adults should have a flu vaccine yearly and tetanus vaccine every 10 years.  -All adults age 48 and older should receive the shingles vaccines (series of two vaccines).        -All adults age 38-68 who are overweight should have a diabetes screening test once every three years.   -Other screening tests & preventive services for persons with diabetes include: an eye exam to screen for diabetic retinopathy, a kidney function test, a foot exam, and stricter control over your cholesterol.   -Cardiovascular screening for adults with routine risk involves an electrocardiogram (ECG) at intervals determined by the provider.   -Colorectal cancer screening should be done for adults age 54-65 with no increased risk factors for colorectal cancer. There are a number of acceptable methods of screening for this type of cancer. Each test has its own benefits and drawbacks. Discuss with your provider what is most appropriate for you during your annual wellness visit. The different tests include: colonoscopy (considered the best screening method), a fecal occult blood test, a fecal DNA test, and sigmoidoscopy.  -All adults born between [facility] should be screened once for Hepatitis C.  -An Abdominal Aortic Aneurysm (AAA) Screening is recommended for men age 73-68 who has ever smoked in their lifetime. Here is a list of your current Health Maintenance items (your personalized list of preventive services) with a due date:  Health Maintenance Due   Topic Date Due    Eye Exam  Never done    Diabetic Foot Care  05/30/2020    COVID-19 Vaccine (2 - Booster for Therese series) 05/19/2021    Yearly Flu Vaccine (1) 09/01/2021    Albumin Urine Test  10/15/2021       Would get J&J booster shot. If you have any spells of dizziness or lightheadedness, would try to check bp, and likely cut back dose of altace to half a pill.

## 2021-11-10 NOTE — PROGRESS NOTES
Rajinder Amado is a 73 y.o. male who presents for evaluation of WTM visit. Last seen by me aug 6, 2021. Overall he has done well/stably since then. Still struggles with chronic diarrhea, about 3-4 times daily, but dramatically improved since addition of opium. Also on lomotil, creon and bentyl. Has been struggling with some left sided low back pain for over a month now. Did not improve with steroids or ice. Has also tried lidoderm patches. Tends to sleep with heating pad, which helps some. He had some left over dilaudid, which he took at bedtime, and that helped the best.  He has a bone scan next week. ROS:  Constitutional: negative for fevers, chills, anorexia and weight loss  Eyes:   negative for visual disturbance and irritation  ENT:   negative for tinnitus,sore throat,nasal congestion,ear pain,hoarseness  Respiratory:  negative for cough, hemoptysis, dyspnea,wheezing  CV:   negative for chest pain, palpitations, lower extremity edema  GI:   negative for nausea, vomiting, diarrhea, abdominal pain,melena  Genitourinary: negative for frequency, dysuria and hematuria  Musculoskel: negative for myalgias, arthralgias, back pain, muscle weakness, joint pain  Neurological:  negative for headaches, dizziness, focal weakness, numbness  Psychiatric:     Negative for depression or anxiety      Past Medical History:   Diagnosis Date    Aneurysm ([address] 2008    CEREBRAL    Arrhythmia     Previous a.fib, ablation, NSR now; NO LONGER FOLLOWED BY CARDIOLOGIST.     Autoimmune disease ([address]     SJOGREN'S    Cancer ([address] 2020    COMMON BILE DUCT, ADENOCARCINOMA, SPINE    Diabetes ([address]     NIDDM    Family history of skin cancer     Hypertension     Sepsis ([address] 2017    STENTING TO BILE DUCT    Status post chemotherapy     Stroke [facility]) 2008    brain aneurysm - no deficits    Sun-damaged skin     Sunburn, blistering        Past Surgical History:   Procedure Laterality Date    HX CHOLECYSTECTOMY  HX GI      COLONOSCOPY, POLYPS (BENIGN)    HX GI  10/06/2017    [facility]     HX GI  2020    IPP REVISION    HX HEART CATHETERIZATION  2005    Ablation     HX HERNIA REPAIR  12/2020    HERNIA REPAIR    HX ORTHOPAEDIC  2000    Spinal fusion W/ HARDWARE    HX OTHER SURGICAL  11/07/2017    [place] Cath, Dr. Esteban Tolliver HX OTHER SURGICAL  01/11/2021    RIGHT IJ PORT-A-CATH PLACEMENT     HX VASCULAR ACCESS  2017    PORTACATH - then removed    IR KYPHOPLASTY LUMBAR  1/12/2021    NEUROLOGICAL PROCEDURE UNLISTED  2005    [place] hole washout from cerebral hemorrhage    [place] ABDOMEN SURGERY PROC UNLISTED  10/2017    APRIL       Family History   Problem Relation Age of Onset    Cancer Father         Breast and Colon, MELANOMA    Hypertension Father     Cancer Mother         LEUKEMIA    No Known Problems Sister     Atrial Fibrillation Brother     No Known Problems Sister     No Known Problems Son     No Known Problems Son     Anesth Problems Neg Hx        Social History     Socioeconomic History    Marital status:      Spouse name: Not on file    Number of children: Not on file    Years of education: Not on file    Highest education level: Not on file   Occupational History    Not on file   Tobacco Use    Smoking status: Never Smoker    Smokeless tobacco: Never Used   Vaping Use    Vaping Use: Never used   Substance and Sexual Activity    Alcohol use: Never     Comment: very rare    Drug use: No    Sexual activity: Yes     Partners: Female   Other Topics Concern    Not on file   Social History Narrative    Not on file     Social Determinants of Health     Financial Resource Strain:     Difficulty of Paying Living Expenses: Not on file   Food Insecurity:     Worried About Running Out of Food in the Last Year: Not on file    Hasmukh of Food in the Last Year: Not on file   Transportation Needs:     Lack of Transportation (Medical):  Not on file    Lack of Transportation (Non-Medical): Not on file   Physical Activity:     Days of Exercise per Week: Not on file    Minutes of Exercise per Session: Not on file   Stress:     Feeling of Stress : Not on file   Social Connections:     Frequency of Communication with Friends and Family: Not on file    Frequency of Social Gatherings with Friends and Family: Not on file    Attends Taoism Services: Not on file    Active Member of [address] [place] [address] or Organizations: Not on file    Attends Club or Organization Meetings: Not on file    Marital Status: Not on file   Intimate Partner Violence:     Fear of Current or Ex-Partner: Not on file    Emotionally Abused: Not on file    Physically Abused: Not on file    Sexually Abused: Not on file   Housing Stability:     Unable to Pay for Housing in the Last Year: Not on file    Number of [place] in the Last Year: Not on file    Unstable Housing in the Last Year: Not on file            Visit Vitals  /64 (BP 1 Location: Left upper arm, BP Patient Position: Sitting)   Pulse 69   Temp 97.9 °F (36.6 °C) (Temporal)   Resp 16   Ht 5' 8\" (1.727 m)   Wt 156 lb (70.8 kg)   SpO2 98%   BMI 23.72 kg/m²       Physical Examination:   General - Well appearing male. Thin, but stable. Nontoxic, nad. HEENT - PERRL, TM no erythema/opacification, normal nasal turbinates, no oropharyngeal erythema or exudate, MMM  Neck - supple, no bruits, no thyroidomegaly, no lymphadenopathy  Pulm - clear to auscultation bilaterally  Cardio - RRR, normal S1 S2, no murmur  Abd - soft, nontender, no masses, no HSM. Benign exam  Extrem - no edema, +2 distal pulses  Neuro-  No focal deficits, CN intact  Low Back--no significant pin point tenderness. Assessment/Plan:    1. Left sided low back pain--has bone scan next week. Got most relief with some dilaudid qhs. Follows with palliative care  2. Chronic diarrhea--has improved some with addition of tincture of opium. Also on creon, bentyl, imodium  3.   Pancreatic cancer--sp whipple  4. Hx L5 compression fracture--sp kyphoplasty. On neurontin now  5. Dm, type 2--on jardiance, a1c 6.0 . Urine micro ordered  6. bph--on proscar and avodart and flomax  7.  htn with relative hypotension--only on altace. Denies any symptoms of dizziness, but it happens, he will cut dose in half. 8.  Thrombocytopenia--follows with heme/onc    rtc 6 months. Thomas Lai III, DO            This is a \"Welcome to [employer]  Initial Preventive Physical Examination (IPPE) providing Personalized Prevention Plan Services (Performed in the first 12 months of enrollment)    I have reviewed the patient's medical history in detail and updated the computerized patient record. Assessment/Plan   Education and counseling provided:  Are appropriate based on today's review and evaluation  End-of-Life planning (with patient's consent)  Pneumococcal Vaccine  Influenza Vaccine    1. Welcome to Medicare preventive visit  -     AMB POC EKG ROUTINE W/ 12 LEADS, INTER & REP       Depression Risk Screen     3 most recent PHQ Screens 11/10/2021   Little interest or pleasure in doing things Not at all   Feeling down, depressed, irritable, or hopeless Not at all   Total Score PHQ 2 0       Alcohol Risk Screen    Do you average more than 1 drink per night or more than 7 drinks a week: No.  None in past 3 years. In the past three months have you have had more than 4 drinks containing alcohol on one occasion: No          Functional Ability and Level of Safety    Diet: The patient is prescribed and follows a special diet. Tries to limit sugars and carbs, eats minimal bread. Hearing: Hearing is good. Vision Screening:  Vision is good. Though does wear glasses now. No exam data present      Activities of Daily Living: The home contains: no safety equipment. Patient does total self care      Ambulation: with no difficulty      Exercise level: extremely active.   Works doing his farming daily. Fall Risk Screen:  Fall Risk Assessment, last 12 mths 11/10/2021   Able to walk? Yes   Fall in past 12 months? 0   Do you feel unsteady? 0   Are you worried about falling 0      Abuse Screen:  Patient is not abused. Lives with wife of 26 years. Screening EKG   EKG order placed: Yes    End of Life Planning   Advanced care planning directives were discussed with the patient and /or family/caregiver. Health Maintenance Due     Health Maintenance Due   Topic Date Due    Eye Exam Retinal or Dilated  Never done    Foot Exam Q1  05/30/2020    COVID-19 Vaccine (2 - Booster for [employer] series) 05/19/2021    Flu Vaccine (1) 09/01/2021    MICROALBUMIN Q1  10/15/2021       Patient Care Team   Patient Care Team:  Gladis Stoll DO as PCP - General (Internal Medicine)  Gladis Stoll DO as PCP - [facility] Provider  Hodan Nowak MD (General Surgery)  Shelton Gutierrez MD (Gastroenterology)  Kwan Little MD (Gastroenterology)  Julio Bennett MD (Hematology and Oncology)  Governor Srinivasa MD as Palliative Care (Palliative Medicine)  Chioma Henley RN as Benefits Care Manager    History     Past Medical History:   Diagnosis Date    Aneurysm [facility]) 2008    CEREBRAL    Arrhythmia     Previous a.fib, ablation, NSR now; NO LONGER FOLLOWED BY CARDIOLOGIST.     Autoimmune disease ([address]     SJOGREN'S    Cancer ([address] 2020    COMMON BILE DUCT, ADENOCARCINOMA, SPINE    Diabetes ([address]     NIDDM    Family history of skin cancer     Hypertension     Sepsis ([address] 2017    STENTING TO BILE DUCT    Status post chemotherapy     Stroke [facility]) 2008    brain aneurysm - no deficits    Sun-damaged skin     Sunburn, blistering       Past Surgical History:   Procedure Laterality Date    HX CHOLECYSTECTOMY      HX GI      COLONOSCOPY, POLYPS (BENIGN)    HX GI  10/06/2017    [facility]     HX GI  2020    [place] REVISION    HX HEART CATHETERIZATION  2005    Ablation     HX HERNIA REPAIR  12/2020    HERNIA REPAIR    HX ORTHOPAEDIC  2000    Spinal fusion W/ HARDWARE    HX OTHER SURGICAL  11/07/2017    [place], Dr. Marlene Caraballo  01/11/2021    RIGHT IJ PORT-A-CATH PLACEMENT     HX VASCULAR ACCESS  2017    PORTACATH - then removed    IR KYPHOPLASTY LUMBAR  1/12/2021    NEUROLOGICAL PROCEDURE UNLISTED  2005    [place] hole washout from cerebral hemorrhage    [place] ABDOMEN SURGERY PROC UNLISTED  10/2017    APRIL     Current Outpatient Medications   Medication Sig Dispense Refill    opium tincture 10 mg/mL (morphine) liquid Take 1 mL by mouth every six (6) hours as needed for Diarrhea for up to 30 days. Max Daily Amount: 4 mL. 118 mL 0    diphenoxylate-atropine (LomotiL) 2.5-0.025 mg per tablet Take 1 Tablet by mouth four (4) times daily as needed for Diarrhea. Max Daily Amount: 4 Tablets. 60 Tablet 0    ondansetron (Zofran ODT) 4 mg disintegrating tablet Take 1 Tablet by mouth every eight (8) hours as needed for Nausea. 10 Tablet 0    dicyclomine (BENTYL) 10 mg capsule Take 1 Capsule by mouth three (3) times daily as needed for Abdominal Cramps. 10 Capsule 0    Creon 36,000-114,000- 180,000 unit cpDR capsule TAKE TWO TO THREE CAPSULES BY MOUTH WITH EACH MEAL AND ONE CAPSULE FOR SNACKS DAILY 1080 Capsule 3    colestipoL (COLESTID) 1 gram tablet       cyanocobalamin (VITAMIN B12) 1,000 mcg/mL injection 1 mL by IntraMUSCular route every seven (7) days. 6 mL 6    calcium carbonate (CALCIUM 500 PO) Take  by mouth.  azelastine (ASTEPRO) 205.5 mcg (0.15 %) 2 Sprays by Both Nostrils route two (2) times a day. 1 Bottle 0    vitamin A (AQUASOL A) 10,000 unit capsule TAKE 1 CAPSULE BY MOUTH ONE TIME A DAY 90 Capsule 3    OLANZapine (ZyPREXA) 10 mg tablet Take 1 Tab by mouth See Admin Instructions. Take nightly for 4 days starting the evening of chemotherapy.  20 Tab 1    pantoprazole (PROTONIX) 40 mg tablet TAKE 1 TABLET BY MOUTH EVERY DAY 30 Tab 0    lidocaine-prilocaine (EMLA) topical cream Apply  to affected area as needed for Pain. 30-45 min prior to treatments 30 g 3    dexAMETHasone (DECADRON) 1 mg tablet Take 2 tablets by mouth twice daily for 14 days 56 Tab 0    dutasteride (AVODART) 0.5 mg capsule Take 1 Cap by mouth daily. 90 Cap 3    naloxone (NARCAN) 4 mg/actuation nasal spray Use 1 spray intranasally, then discard. Repeat with new spray every 2 min as needed for opioid overdose symptoms, alternating nostrils. 1 Each 0    aluminum & magnesium hydroxide-simethicone (Maalox Maximum Strength) 400-400-40 mg/5 mL suspension Take 10 mL by mouth every six (6) hours as needed for Indigestion.  vitamin E (AQUA GEMS) 400 unit capsule Take 1 Cap by mouth daily. 90 Cap 3    empagliflozin (Jardiance) 25 mg tablet Take 1 Tab by mouth nightly. 90 Tab 3    gabapentin (NEURONTIN) 100 mg capsule Take 3 capsules by mouth twice a day. 540 Cap 3    tamsulosin (FLOMAX) 0.4 mg capsule TAKE ONE CAPSULE BY MOUTH EVERY EVENING 90 Cap 3    ramipriL (ALTACE) 5 mg capsule Take 1 Cap by mouth daily. (Patient taking differently: Take 5 mg by mouth nightly.) 90 Cap 3    lidocaine (LIDODERM) 5 % 1 Patch by TransDERmal route every twenty-four (24) hours. Apply patch to the affected area for 12 hours a day and remove for 12 hours a day. 30 Each 5    loperamide (IMODIUM) 2 mg capsule Take 2-4 mg by mouth as needed for Diarrhea. Give 4 mg after first loose stool. Give an additional 2 mg after each loose stool as needed up to a maximum of 8 doses (16 mg/day).  finasteride (PROSCAR) 5 mg tablet TAKE 1 TABLET BY MOUTH EVERY DAY      acetaminophen (TYLENOL) 325 mg tablet Take 2 Tabs by mouth every four (4) hours as needed for Pain or Fever (Over the counter medication). 1 Tab 0    alpha-D-galactosidase (BEANO PO) Take 1 Tab by mouth two (2) times a day.  cetirizine (ZYRTEC) 10 mg tablet Take 10 mg by mouth nightly.        Allergies   Allergen Reactions    Shellfish Derived Anaphylaxis     Soft shell crabs    Morphine Nausea and Vomiting    Pcn [Penicillins] Other (comments)     Pt stated \"always been told PCN\"  Pt tolerated other cephalosporins in the past       Family History   Problem Relation Age of Onset    Cancer Father         Breast and Colon, MELANOMA    Hypertension Father     Cancer Mother         LEUKEMIA    No Known Problems Sister     Atrial Fibrillation Brother     No Known Problems Sister     No Known Problems Son     No Known Problems Son     Anesth Problems Neg Hx      Social History     Tobacco Use    Smoking status: Never Smoker    Smokeless tobacco: Never Used   Substance Use Topics    Alcohol use: Never     Comment: very rare       Zohreh Tariq III, DO

## 2021-11-15 PROBLEM — F11.99 OPIOID USE, UNSPECIFIED WITH UNSPECIFIED OPIOID-INDUCED DISORDER (HCC): Status: ACTIVE | Noted: 2021-01-01

## 2021-11-15 NOTE — PROGRESS NOTES
Palliative Medicine Outpatient Services  Jesus: [phone] ([phone])    Patient Name: Chiquita Denis YOB: 1955    Date of Current Visit: 11/15/21  Location of Current Visit:    [] [facility] Office  [] [facility] Office  [] [address] [place] [address]  [] Home  [x] Other: Virtual synchronous A/V visit    Date of Initial Visit: 4/6/2020  Referral from: Dr. Sam Edmond  Primary Care Physician: Merly Wisdom DO      SUMMARY:   Chiquita Denis is a 73y.o. 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 Sam Edmond for management of symptoms and psychosocial support. 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 This is second marriage for both of them. Current treatment-completed RT to the para-aortic mass, pursuing diagnostics with GI physician Dr. Edith Estrada for gastroparesis and pancreatic enzymes, has had biliary stents replaced. Status post celiac plexus block and reversal of Whipple procedure on 9/9/2020    Hospitalization at [facility] for uncontrolled pain in December 2020    L5 biopsy, ablation and kyphoplasty on 1/12/2021    Current treatment-on cisplatin plus gemcitabine plus Abraxane, chemotherapy currently on hold  chemotherapy versus functional diarrhea     PALLIATIVE DIAGNOSES:       ICD-10-CM ICD-9-CM    1. Cholangiocarcinoma of biliary tract (HCC)  C22.1 155.1 HYDROmorphone (DILAUDID) 2 mg tablet   2. Opioid use, unspecified with unspecified opioid-induced disorder  F11.99 292.9      305.50    3. Functional diarrhea  K59.1 564.5    4. Pain from bone metastases (HCC)  G89.3 338.3     C79.51            PLAN:   Patient Instructions   Dear Anna Gant.,     It was a pleasure seeing you today by virtual video visit.     This is the plan we talked about:    -Disease discussion  -We reviewed your most recent CT and bone scan. There is progression of disease in the lung nodules. You are having more pain in your left lower back but this does not correlate with new bony mets in the left side. However, there is left hydronephrosis which is kidney swelling from the retroperitoneal tumor pushing on your renal vein. This could be the cause of your flank pain. Please call your urologist to make an appointment. You are also seeing Dr. Satya Whalen today.  -Start Dilaudid 2 mg every 3 hours as needed. Increase to 4 mg every 3 hours if the 2 mg not helping. You have not been on opioids in a while. .     -Chronic low back pain from new L5 met status post ablation and kyphoplasty  -This is resolved     -Chemo-induced diarrhea  -You do still have a lot of water in your stool. You have a bowel movement about 2-3 times in the day and 3-4 times at night. This is a lot better than it was before starting the opium tincture. -You are now on Creon and started colestipol 2 g. But this caused severe constipation and you ended up in the ER. Colestipol is currently on hold. I suggest that you try 500 mg of colestipol once your pain is under control and hold off on the opium tincture when you are trying colestipol.    -Sjogren's disease  -You are very careful to stay well hydrated. You drink water and sugar-free gatorade or powerade.     -Multivitamin deficiency  -You were found to have severe vitamin A, vitamin D, vitamin E deficiency. You are on replacement therapy now. You continue to follow with your PCP about this.  You had labs drawn in August and have had your vitamin supplementation adjusted due to this.    -Fatigue  -Sometimes you cannot do what you want to do because of the tiredness of your legs. You can get what you need to do done as long as you take breaks.    -Nausea  Your nausea has returned. I provided you with Compazine.    -Follow up  -W I will see you again in 4 weeks       reviewed and appropriate. Most recent CT scan reviewed and results discussed with patient. See results below in data portion of note. GOALS OF CARE / TREATMENT PREFERENCES:   [====Goals of Care====]  GOALS OF CARE:  Patient / health care proxy stated goals: See Patient Instructions / Summary    TREATMENT PREFERENCES:   Code Status:  [x] Attempt Resuscitation       [] Do Not Attempt Resuscitation    Advance Care Planning:  [x] [employer] Interdisciplinary Team has updated the ACP Navigator with Decision Maker and Patient Capacity      Primary Decision MakerJairo Nye - [phone]    Secondary Decision Maker: Ridge Cristina III - Child - [phone]    Supplemental (Other) Decision Maker: Bryce Gomez Child - [phone]  Advance Care Planning 11/15/2021   Patient's Healthcare Decision Maker is: Named in scanned ACP document   Primary Decision Maker Name -   Primary Decision Maker Phone Number -   Primary Decision Maker Relationship to Patient -   Confirm Advance Directive Yes, on file   Patient Would Like to Complete Advance Directive -   Does the patient have other document types -       Other:  (If patient appropriate for POST, consider using PALLPOST smart phrase here)    The palliative care team has discussed with patient / health care proxy about goals of care / treatment preferences for patient.  [====Goals of Care====]     PRESCRIPTIONS GIVEN:     Medications Ordered Today   Medications    prochlorperazine (Compazine) 10 mg tablet     Sig: Take 0.5 Tablets by mouth every six (6) hours as needed for Nausea or Vomiting for up to 30 days.      Dispense:  90 Tablet     Refill:  2    HYDROmorphone (DILAUDID) 2 mg tablet     Sig: Take 1 Tablet by mouth every three (3) hours as needed for Pain for up to 15 days. Max Daily Amount: 16 mg. Dispense:  120 Tablet     Refill:  0           FOLLOW UP:     Future Appointments   Date Time Provider Sloane Wynni   11/15/2021 11:45 AM [facility]   11/18/2021  3:00 PM [facility] [address] REG   11/24/2021 10:15 AM [facility]   12/16/2021  3:00 PM [facility] [address] REG   12/30/2021  9:30 AM Nicole Moon MD [facility]           PHYSICIANS INVOLVED IN CARE:   Patient Care Team:  Mauro Palacios DO as PCP - General (Internal Medicine)  Mauro Palacios DO as PCP - [address] [place] [address] [facility]  Emily Tucker MD (General Surgery)  Maggie Pantoja MD (Gastroenterology)  Stephany Overton MD (Gastroenterology)  Chanelle Sarabia MD (Hematology and Oncology)  Nicole Moon MD as Palliative Care (Palliative Medicine)  Maday Chaudhari RN as Benefits Care Manager       HISTORY:   Reviewed patient-completed ESAS and advance care planning form. Reviewed patient record in prescription monitoring program.    CHIEF COMPLAINT: No chief complaint on file. HPI/SUBJECTIVE:    The patient is: [x] Verbal / [] Nonverbal     Patient complains of new left lower back pain and flank pain. He has been struggling with this for a few weeks now and ended up in the ER twice. He was only taking Advil without any relief. He is now having nausea and vomiting from pain. His right-sided lower back pain has resolved since kyphoplasty. He was off all opioids but with the new pain, he is willing to go back on opioids. He continues to have diarrhea but opium tincture has been helpful. He was started on colestipol by his GI physician but it caused severe abdominal cramping and constipation so he stopped taking it.  -------    Patient here with his wife. Both are very good historians.   Mid upper back pain-much improved since radiation to the periaortic mass. He struggled with pain for several months before it was identified as cancer recurrence. He was started on fentanyl 25 mcg patch which he wants to wean off of. He has made upper and mid zone abdominal pain which comes and goes. He has not noticed any specific pattern to the pain but usually the pain comes on before vomiting or relieves without vomiting. Sometimes, he vomits food that he ate about 12 to 14 hours ago. No constipation. He has 2 liquid bowel movements every day. He is unable to tolerate eggs or honey. He is getting tests done to evaluate his pancreatic enzymes. He has very good support through his wife. Clinical Pain Assessment (nonverbal scale for nonverbal patients):   [++++ Clinical Pain Assessment++++]  [++++Pain Severity++++]: Pain: 8  [++++Pain Character++++]: cramping  [++++Pain Duration++++]: months  [++++Pain Effect++++]: functional   [++++Pain Factors++++]: none in particular  [++++Pain Frequency++++]: on and off  [++++Pain Location++++]: upper abdomen and mid back  [++++ Clinical Pain Assessment++++]       FUNCTIONAL ASSESSMENT:     Palliative Performance Scale (PPS):  PPS: 70       PSYCHOSOCIAL/SPIRITUAL SCREENING:     Any spiritual / Yarsanism concerns:  [] Yes /  [x] No    Caregiver Burnout:  [] Yes /  [x] No /  [] No Caregiver Present      Anticipatory grief assessment:   [x] Normal  / [] Maladaptive       ESAS Anxiety: Anxiety: 0    ESAS Depression: Depression: 0       REVIEW OF SYSTEMS:     The following systems were [x] reviewed / [] unable to be reviewed  Systems: constitutional, ears/nose/mouth/throat, respiratory, gastrointestinal, genitourinary, musculoskeletal, integumentary, neurologic, psychiatric, endocrine. Positive findings noted below.   Modified ESAS Completed by: provider   Fatigue: 4 Drowsiness: 4   Depression: 0 Pain: 8   Anxiety: 0 Nausea: 5   Anorexia: 1 Dyspnea: 4   Best Well-Bein Constipation: No   Other Problem (Comment): 0          PHYSICAL EXAM:     Wt Readings from Last 3 Encounters:   11/10/21 156 lb (70.8 kg)   11/02/21 155 lb 1.6 oz (70.4 kg)   10/21/21 153 lb 14.4 oz (69.8 kg)     There were no vitals taken for this visit. Last bowel movement: See Nursing Note    Constitutional    [x] Appears well-developed and well-nourished in no apparent distress    [] Abnormal:  Mental status  [x] Alert and awake  [x] Oriented to person/place/time  [x] Able to follow commands  [] Abnormal:   Eyes  [x] EOM normal   [x] Sclera normal   [x] No visible ocular discharge  [] Abnormal:   HENT  [x] Normocephalic, atraumatic  [x] Mouth/Throat: Moist mucous membranes   [x] External Ears normal  [] Abnormal:  Neck  [x] No visualized mass  [] Abnormal:  Pulmonary/Chest   [x] Respiratory effort normal  [x] No visualized signs of difficulty breathing or respiratory distress  [] Abnormal:  Musculoskeletal  [x] Normal gait with no signs of ataxia  [x] Normal range of motion of neck  [] Abnormal:  Neurological:   [x] No facial asymmetry (Cranial nerve 7 motor function)  [x] No gaze palsy  [] Abnormal:   Skin  [x] No significant exanthematous lesions or discoloration noted on facial skin  [] Abnormal:                                  Psychiatric  [x] Normal affect  [x] No hallucinations  [] Abnormal:    Other pertinent observable physical exam findings:    Due to this being a TeleHealth evaluation, many elements of the physical examination are unable to be assessed. HISTORY:     Past Medical History:   Diagnosis Date    Aneurysm ([address] 2008    CEREBRAL    Arrhythmia     Previous a.fib, ablation, NSR now; NO LONGER FOLLOWED BY CARDIOLOGIST.     Autoimmune disease ([address]     SJOGREN'S    Cancer ([address] 2020    COMMON BILE DUCT, ADENOCARCINOMA, SPINE    Diabetes ([address]     NIDDM    Family history of skin cancer     Hypertension     Sepsis ([address] 2017    STENTING TO BILE DUCT    Status post chemotherapy     Stroke [facility]) 2008    brain aneurysm - no deficits    Sun-damaged skin     Sunburn, blistering       Past Surgical History:   Procedure Laterality Date    HX CHOLECYSTECTOMY      HX GI      COLONOSCOPY, POLYPS (BENIGN)    HX GI  10/06/2017    [facility]     HX GI  2020    IPP REVISION    HX HEART CATHETERIZATION  2005    Ablation     HX HERNIA REPAIR  12/2020    HERNIA REPAIR    HX ORTHOPAEDIC  2000    Spinal fusion W/ HARDWARE    HX OTHER SURGICAL  11/07/2017    [place] Cath, Dr. Kaylie Goodwin HX OTHER SURGICAL  01/11/2021    RIGHT IJ PORT-A-CATH PLACEMENT     HX VASCULAR ACCESS  2017    PORTACATH - then removed    IR KYPHOPLASTY LUMBAR  1/12/2021    NEUROLOGICAL PROCEDURE UNLISTED  2005    [place] hole washout from cerebral hemorrhage    OR ABDOMEN SURGERY PROC UNLISTED  10/2017    APRIL      Family History   Problem Relation Age of Onset    Cancer Father         Breast and Colon, MELANOMA    Hypertension Father     Cancer Mother         LEUKEMIA    No Known Problems Sister     Atrial Fibrillation Brother     No Known Problems Sister     No Known Problems Son     No Known Problems Son     Anesth Problems Neg Hx       History reviewed, no pertinent family history. Social History     Tobacco Use    Smoking status: Never Smoker    Smokeless tobacco: Never Used   Substance Use Topics    Alcohol use: Never     Comment: very rare     Allergies   Allergen Reactions    Shellfish Derived Anaphylaxis     Soft shell crabs    Morphine Nausea and Vomiting    Pcn [Penicillins] Other (comments)     Pt stated \"always been told PCN\"  Pt tolerated other cephalosporins in the past      Current Outpatient Medications   Medication Sig    prochlorperazine (Compazine) 10 mg tablet Take 0.5 Tablets by mouth every six (6) hours as needed for Nausea or Vomiting for up to 30 days.     HYDROmorphone (DILAUDID) 2 mg tablet Take 1 Tablet by mouth every three (3) hours as needed for Pain for up to 15 days. Max Daily Amount: 16 mg.    opium tincture 10 mg/mL (morphine) liquid Take 1 mL by mouth every six (6) hours as needed for Diarrhea for up to 30 days. Max Daily Amount: 4 mL.  diphenoxylate-atropine (LomotiL) 2.5-0.025 mg per tablet Take 1 Tablet by mouth four (4) times daily as needed for Diarrhea. Max Daily Amount: 4 Tablets.  ondansetron (Zofran ODT) 4 mg disintegrating tablet Take 1 Tablet by mouth every eight (8) hours as needed for Nausea.  dicyclomine (BENTYL) 10 mg capsule Take 1 Capsule by mouth three (3) times daily as needed for Abdominal Cramps.  Creon 36,000-114,000- 180,000 unit cpDR capsule TAKE TWO TO THREE CAPSULES BY MOUTH WITH EACH MEAL AND ONE CAPSULE FOR SNACKS DAILY    colestipoL (COLESTID) 1 gram tablet     cyanocobalamin (VITAMIN B12) 1,000 mcg/mL injection 1 mL by IntraMUSCular route every seven (7) days.  calcium carbonate (CALCIUM 500 PO) Take  by mouth.  azelastine (ASTEPRO) 205.5 mcg (0.15 %) 2 Sprays by Both Nostrils route two (2) times a day.  vitamin A (AQUASOL A) 10,000 unit capsule TAKE 1 CAPSULE BY MOUTH ONE TIME A DAY    OLANZapine (ZyPREXA) 10 mg tablet Take 1 Tab by mouth See Admin Instructions. Take nightly for 4 days starting the evening of chemotherapy.  pantoprazole (PROTONIX) 40 mg tablet TAKE 1 TABLET BY MOUTH EVERY DAY    lidocaine-prilocaine (EMLA) topical cream Apply  to affected area as needed for Pain. 30-45 min prior to treatments    dexAMETHasone (DECADRON) 1 mg tablet Take 2 tablets by mouth twice daily for 14 days    dutasteride (AVODART) 0.5 mg capsule Take 1 Cap by mouth daily.  naloxone (NARCAN) 4 mg/actuation nasal spray Use 1 spray intranasally, then discard. Repeat with new spray every 2 min as needed for opioid overdose symptoms, alternating nostrils.     aluminum & magnesium hydroxide-simethicone (Maalox Maximum Strength) 400-400-40 mg/5 mL suspension Take 10 mL by mouth every six (6) hours as needed for Indigestion.  vitamin E (AQUA GEMS) 400 unit capsule Take 1 Cap by mouth daily.  empagliflozin (Jardiance) 25 mg tablet Take 1 Tab by mouth nightly.  gabapentin (NEURONTIN) 100 mg capsule Take 3 capsules by mouth twice a day.  tamsulosin (FLOMAX) 0.4 mg capsule TAKE ONE CAPSULE BY MOUTH EVERY EVENING    ramipriL (ALTACE) 5 mg capsule Take 1 Cap by mouth daily. (Patient taking differently: Take 5 mg by mouth nightly.)    lidocaine (LIDODERM) 5 % 1 Patch by TransDERmal route every twenty-four (24) hours. Apply patch to the affected area for 12 hours a day and remove for 12 hours a day.  finasteride (PROSCAR) 5 mg tablet TAKE 1 TABLET BY MOUTH EVERY DAY    acetaminophen (TYLENOL) 325 mg tablet Take 2 Tabs by mouth every four (4) hours as needed for Pain or Fever (Over the counter medication).  alpha-D-galactosidase (BEANO PO) Take 1 Tab by mouth two (2) times a day.  cetirizine (ZYRTEC) 10 mg tablet Take 10 mg by mouth nightly.  loperamide (IMODIUM) 2 mg capsule Take 2-4 mg by mouth as needed for Diarrhea. Give 4 mg after first loose stool. Give an additional 2 mg after each loose stool as needed up to a maximum of 8 doses (16 mg/day). (Patient not taking: Reported on 11/15/2021)     No current facility-administered medications for this visit. LAB DATA REVIEWED:     Lab Results   Component Value Date/Time    WBC 5.1 11/02/2021 11:06 AM    HGB 12.0 (L) 11/02/2021 11:06 AM    PLATELET 57 (L) 35/49/1036 11:06 AM     Lab Results   Component Value Date/Time    Sodium 136 11/02/2021 11:06 AM    Potassium 3.4 (L) 11/02/2021 11:06 AM    Chloride 109 (H) 11/02/2021 11:06 AM    CO2 22 11/02/2021 11:06 AM    BUN 13 11/02/2021 11:06 AM    Creatinine 0.80 11/02/2021 11:06 AM    Calcium 8.4 (L) 11/02/2021 11:06 AM    Magnesium 2.2 11/02/2021 11:06 AM    Phosphorus 3.0 11/02/2021 11:06 AM      Lab Results   Component Value Date/Time    Alk.  phosphatase 191 (H) 11/02/2021 11:06 AM Protein, total 6.2 (L) 11/02/2021 11:06 AM    Albumin 3.4 (L) 11/02/2021 11:06 AM    Globulin 2.8 11/02/2021 11:06 AM     Lab Results   Component Value Date/Time    INR 1.1 09/09/2020 02:15 AM    Prothrombin time 11.5 (H) 09/09/2020 02:15 AM    aPTT 29.9 09/09/2020 02:15 AM      Lab Results   Component Value Date/Time    Iron 31 (L) 01/02/2020 03:24 AM    TIBC 245 (L) 01/02/2020 03:24 AM    Iron % saturation 13 (L) 01/02/2020 03:24 AM    Ferritin 122 01/02/2020 03:24 AM      CT- Nov 2021  IMPRESSION  1. Progression of lung metastases. 2. Stable retroperitoneal tumor causing vascular occlusions/stenoses and left  UPJ obstruction. 3. New sclerotic bone focus/metastasis. Chest CT- aug 2021       IMPRESSION  1. Multiple pulmonary nodules some of which have increased in size which may  represent progression of disease. Several new nodules are noted.     2. Nonspecific foci of enhancement in the right lobe the liver. These may  represent focal areas of inflammation or hyperemia given the  hepaticojejunostomy. Attention on follow-up imaging     3. Status post Whipple procedure.     4.  Stable periaortic soft tissue density.     5.  Stable perinephric stranding and fluid around the left kidney hilum       CT chest, abdomen and pelvis-May 2021  IMPRESSION     1. Unchanged appearance of the chest, abdomen, and pelvis when compared to  3/1/2021. No acute findings or evidence of disease progression. 2. Unchanged small pulmonary nodules. Unchanged retroperitoneal soft tissue mass  with left renal vein occlusion and collateral retroperitoneal venous structures.       CT abd 3/1/21  IMPRESSION  1. No significant change in the appearance of pulmonary nodules. 2. No significant change in the appearance of retroperitoneal adenopathy/mass. There is mass effect upon the vasculature, including occlusion of the left renal  vein with collateral vessels in the retroperitoneum, likely unchanged.   3. Incidental findings as above.    CT chest 12/29/2020  IMPRESSION:     1. Numerous tiny pulmonary parenchymal nodules right greater than left,  suspicious for metastatic disease. 2. Focal ill-defined opacity in the right upper lobe which may represent  infiltrate. Follow-up recommended, however. 3. Incidental findings in the upper abdomen described earlier same day. CONTROLLED SUBSTANCES SAFETY ASSESSMENT (IF ON CONTROLLED SUBSTANCES):     Reviewed opioid safety handout:  [x] Yes   [] No  24 hour opioid dose >150mg morphine equivalent/day:  [] Yes   [x] No  Benzodiazepines:  [] Yes   [x] No  Sleep apnea:  [] Yes   [x] No  Urine Toxicology Testing within last 6 months:  [] Yes   [x] No  History of or new aberrant medication taking behaviors:  [] Yes   [x] No  Has Narcan been prescribed [x] Yes   [] No          Total time: 45 minutes   Counseling / coordination time: 40  > 50% counseling / coordination?:   Consent:  He and/or health care decision maker is aware that that he may receive a bill for this telehealth service, depending on his insurance coverage, and has provided verbal consent to proceed: Yes    CPT Codes 78836-35457 for Established Patients may apply to this Telehealth Visit  Pursuant to the emergency declaration under the [facility] waiver authority and the [employer] General Act, this Virtual  Visit was conducted, with patient's consent, to reduce the patient's risk of exposure to COVID-19 and provide continuity of care for an established patient. Services were provided through a video synchronous discussion virtually to substitute for in-person clinic visit.

## 2021-11-15 NOTE — PROGRESS NOTES
2001 [facility] [address], 210 [facility], [address] [place] [address], 200 [facility]  [phone]    Follow-up Note      Patient: Rajinder Amado MRN: [id number]  SSN: [id number]    YOB: 1955  Age: 73 y.o. Sex: male        Diagnosis:     1. 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 S/P Pancreatoduodenectomy on  10/06/2017  2. Adjuvant monotherapy with Capecitabine - s/p 6 cycles   (12/4/2017 - 05/2018)  3. SBRT RP node/soft tissue 04/2020  4. Palliative chemotherapy   Cis/[place]/Abraxane - s/p 6 cycles - discontinued d/t progression of disease    Subjective:      Rajinder Amado is a 73 y.o. male with a diagnosis of extrahepatic cholangiocarcinoma with metastatic to the lungs, bones and retroperitoneal node/soft tissue. He presented to the ED in August 2017 with obstructive jaundice. He was found to have an obstructive lesion in the dital bile duct. The CT showed marked intrahepatic biliary dilation and common bile duct dilation to the level of the mid common bile duct, which ws markedly narrowed. He underwent a stenting procedure followed by spyglass cholangiogram. The brushings revealed a diagnosis of cholangiocarcinoma. He underwent a Whipple procedure on 10/06/2017. He completed 6 cycles of adjuvant Xeloda. PET scan showed increased activity in the soft tissue along the SMA. CT guided biopsy showed local recurrence. He underwent SBRT by Dr. Carlyn Tomlinson in April 2020. He was admitted with severe back pain. Repeat CT shows growth of tumor in the L4/L5, lung and RP node/soft tissue. He underwent a CT guided celiac plexus block which has helped the back pain immensely. A biopsy of the L4 vertebrae confirms a diagnosis of metastatic cholangiocarcinoma.       Jonnie received palliative chemotherapy. He has been off of treatment since the end of May. He is here today to discuss the most recent scans. He is experiencing worsening pain in the right SI joint/sacral area. Review of Systems:    Constitutional: fatigue  Eyes: negative  Ears, Nose, Mouth, Throat, and Face: negative  Respiratory: negative  Cardiovascular: negative  Gastrointestinal: diarrhea (controlled with tincture of opium)  Genitourinary:negative  Integument/Breast: negative  Hematologic/Lymphatic: negative  Musculoskeletal: B/L ankle edema  Neurological: numbness/tingling B/L feet    Review of systems was reviewed and updated as needed on 11/24/21. Past Medical History:   Diagnosis Date    Aneurysm [facility]) 2008    CEREBRAL    Arrhythmia     Previous a.fib, ablation, NSR now; NO LONGER FOLLOWED BY CARDIOLOGIST.     Autoimmune disease ([facility] [address]     SJOGREN'S    Cancer ([facility] [address] 2020    COMMON BILE DUCT, ADENOCARCINOMA, SPINE    Diabetes ([facility] [address]     NIDDM    Family history of skin cancer     Hypertension     Sepsis ([facility] [address] 2017    STENTING TO BILE DUCT    Status post chemotherapy     Stroke [facility]) 2008    brain aneurysm - no deficits    Sun-damaged skin     Sunburn, blistering      Past Surgical History:   Procedure Laterality Date    HX CHOLECYSTECTOMY      HX GI      COLONOSCOPY, POLYPS (BENIGN)    HX GI  10/06/2017    [facility] [place]     HX GI  2020    WHIPPLE REVISION    HX HEART CATHETERIZATION  2005    Ablation     HX HERNIA REPAIR  12/2020    HERNIA REPAIR    HX ORTHOPAEDIC  2000    Spinal fusion W/ HARDWARE    HX OTHER SURGICAL  11/07/2017    [place] Cath, Dr. Win Graf  01/11/2021    RIGHT IJ PORT-A-CATH PLACEMENT     HX VASCULAR ACCESS  2017    PORTACATH - then removed    IR KYPHOPLASTY LUMBAR  1/12/2021    NEUROLOGICAL PROCEDURE UNLISTED  2005    Ting hole washout from cerebral hemorrhage    HI ABDOMEN SURGERY PROC UNLISTED  10/2017    WHIPPLE      Family History   Problem Relation Age of Onset    Cancer Father         Breast and Colon, MELANOMA    Hypertension Father     Cancer Mother         LEUKEMIA    No Known Problems Sister     Atrial Fibrillation Brother     No Known Problems Sister     No Known Problems Son     No Known Problems Son     Anesth Problems Neg Hx      Social History     Tobacco Use    Smoking status: Never Smoker    Smokeless tobacco: Never Used   Substance Use Topics    Alcohol use: Never     Comment: very rare      Prior to Admission medications    Medication Sig Start Date End Date Taking? Authorizing Provider   prochlorperazine (Compazine) 10 mg tablet Take 0.5 Tablets by mouth every six (6) hours as needed for Nausea or Vomiting for up to 30 days. 11/15/21 12/15/21 Yes Ceclia Seip, MD   HYDROmorphone (DILAUDID) 2 mg tablet Take 1 Tablet by mouth every three (3) hours as needed for Pain for up to 15 days. Max Daily Amount: 16 mg. 11/15/21 11/30/21 Yes Ceclia Seip, MD   opium tincture 10 mg/mL (morphine) liquid Take 1 mL by mouth every six (6) hours as needed for Diarrhea for up to 30 days. Max Daily Amount: 4 mL. 10/29/21 11/28/21 Yes Maryanne Brunner, MD   diphenoxylate-atropine (LomotiL) 2.5-0.025 mg per tablet Take 1 Tablet by mouth four (4) times daily as needed for Diarrhea. Max Daily Amount: 4 Tablets. 10/28/21  Yes Maryanne Brunner, MD   ondansetron (Zofran ODT) 4 mg disintegrating tablet Take 1 Tablet by mouth every eight (8) hours as needed for Nausea. 10/15/21  Yes Alberta Cummings MD   dicyclomine (BENTYL) 10 mg capsule Take 1 Capsule by mouth three (3) times daily as needed for Abdominal Cramps.  10/15/21  Yes Alberta Cummings MD   Creon 36,000-114,000- 180,000 unit cpDR capsule TAKE TWO TO THREE CAPSULES BY MOUTH WITH EACH MEAL AND ONE CAPSULE FOR SNACKS DAILY 10/14/21  Yes Matilda Eastman, DO   colestipoL (COLESTID) 1 gram tablet  9/27/21  Yes Provider, Historical   cyanocobalamin (VITAMIN B12) 1,000 mcg/mL injection 1 mL by IntraMUSCular route every seven (7) days. 8/12/21  Yes Silas Raya III, DO   calcium carbonate (CALCIUM 500 PO) Take  by mouth. Yes Provider, Historical   azelastine (ASTEPRO) 205.5 mcg (0.15 %) 2 Sprays by Both Nostrils route two (2) times a day. 7/16/21  Yes Chadd Hamilton MD   vitamin A (AQUASOL A) 10,000 unit capsule TAKE 1 CAPSULE BY MOUTH ONE TIME A DAY 6/16/21  Yes Silas Raya III, DO   OLANZapine (ZyPREXA) 10 mg tablet Take 1 Tab by mouth See Admin Instructions. Take nightly for 4 days starting the evening of chemotherapy. 1/29/21  Yes Gilbert Mcneal MD   pantoprazole (PROTONIX) 40 mg tablet TAKE 1 TABLET BY MOUTH EVERY DAY 1/11/21  Yes Radha Khalil MD   lidocaine-prilocaine (EMLA) topical cream Apply  to affected area as needed for Pain. 30-45 min prior to treatments 1/7/21  Yes Gilbert Mcneal MD   dexAMETHasone (DECADRON) 1 mg tablet Take 2 tablets by mouth twice daily for 14 days 12/31/20  Yes Ruby Valle MD   dutasteride (AVODART) 0.5 mg capsule Take 1 Cap by mouth daily. 12/21/20  Yes Silas Raya III, DO   naloxone (NARCAN) 4 mg/actuation nasal spray Use 1 spray intranasally, then discard. Repeat with new spray every 2 min as needed for opioid overdose symptoms, alternating nostrils. 12/11/20  Yes Caity Feliciano MD   aluminum & magnesium hydroxide-simethicone (Maalox Maximum Strength) 400-400-40 mg/5 mL suspension Take 10 mL by mouth every six (6) hours as needed for Indigestion. Yes Provider, Historical   vitamin E (AQUA GEMS) 400 unit capsule Take 1 Cap by mouth daily. 10/26/20  Yes Silas Raya III,    empagliflozin (Jardiance) 25 mg tablet Take 1 Tab by mouth nightly. 10/20/20  Yes Silas Raya III, DO   gabapentin (NEURONTIN) 100 mg capsule Take 3 capsules by mouth twice a day.  10/20/20  Yes Silas Raya III, DO   tamsulosin (FLOMAX) 0.4 mg capsule TAKE ONE CAPSULE BY MOUTH EVERY EVENING 10/20/20  Yes Silas Raya III, DO   ramipriL (ALTACE) 5 mg capsule Take 1 Cap by mouth daily. Patient taking differently: Take 5 mg by mouth nightly. 10/20/20  Yes Silas Raya III, DO   lidocaine (LIDODERM) 5 % 1 Patch by TransDERmal route every twenty-four (24) hours. Apply patch to the affected area for 12 hours a day and remove for 12 hours a day. 10/15/20  Yes Silas Raya III, DO   loperamide (IMODIUM) 2 mg capsule Take 2-4 mg by mouth as needed for Diarrhea. Give 4 mg after first loose stool. Give an additional 2 mg after each loose stool as needed up to a maximum of 8 doses (16 mg/day). Yes Provider, Historical   finasteride (PROSCAR) 5 mg tablet TAKE 1 TABLET BY MOUTH EVERY DAY 4/13/20  Yes Provider, Historical   acetaminophen (TYLENOL) 325 mg tablet Take 2 Tabs by mouth every four (4) hours as needed for Pain or Fever (Over the counter medication). 1/2/20  Yes Scheryl , NP   alpha-D-galactosidase (BEANO PO) Take 1 Tab by mouth two (2) times a day. Yes Other, MD Flynn   cetirizine (ZYRTEC) 10 mg tablet Take 10 mg by mouth nightly. Yes Provider, Historical   azithromycin (ZITHROMAX) 250 mg tablet  11/23/21   Provider, Historical          Allergies   Allergen Reactions    Shellfish Derived Anaphylaxis     Soft shell crabs    Morphine Nausea and Vomiting    Pcn [Penicillins] Other (comments)     Pt stated \"always been told PCN\"  Pt tolerated other cephalosporins in the past           Objective:     Visit Vitals  BP (!) 100/58 (BP 1 Location: Left upper arm, BP Patient Position: Sitting)   Pulse 75   Temp 98.2 °F (36.8 °C) (Temporal)   Ht 5' 8\" (1.727 m)   Wt 151 lb 12.8 oz (68.9 kg)   SpO2 99%   BMI 23.08 kg/m²     Pain Scale: /10      Physical Exam:     GENERAL: alert, cooperative, no distress, appears stated age  EYE: negative  LYMPHATIC: Cervical, supraclavicular, and axillary nodes normal.   THROAT & NECK: normal and no erythema or exudates noted. LUNG: clear to auscultation bilaterally  HEART: irregularly, irregular rythm  ABDOMEN: soft, non-tender  EXTREMITIES: trace ankle edema  SKIN: Scabs on top of head  NEUROLOGIC: Numbness in fingertips and feet - mild    The above physical exam was reviewed and updated as needed on 11/24/21. Lab Results   Component Value Date/Time    WBC 5.1 11/02/2021 11:06 AM    Hemoglobin (POC) 10.3 (L) 10/06/2017 01:14 PM    HGB 12.0 (L) 11/02/2021 11:06 AM    Hematocrit (POC) 28 (L) 05/13/2021 09:16 AM    HCT 37.7 11/02/2021 11:06 AM    PLATELET 57 (L) 62/06/8665 11:06 AM    MCV 91.3 11/02/2021 11:06 AM       Lab Results   Component Value Date/Time    Sodium 136 11/18/2021 03:10 PM    Potassium 3.3 (L) 11/18/2021 03:10 PM    Chloride 105 11/18/2021 03:10 PM    CO2 25 11/18/2021 03:10 PM    Anion gap 6 11/18/2021 03:10 PM    Glucose 260 (H) 11/18/2021 03:10 PM    BUN 10 11/18/2021 03:10 PM    Creatinine 0.83 11/18/2021 03:10 PM    BUN/Creatinine ratio 12 11/18/2021 03:10 PM    GFR est AA >60 11/18/2021 03:10 PM    GFR est non-AA >60 11/18/2021 03:10 PM    Calcium 8.4 (L) 11/18/2021 03:10 PM    Bilirubin, total 0.6 11/18/2021 03:10 PM    Alk. phosphatase 176 (H) 11/18/2021 03:10 PM    Protein, total 6.0 (L) 11/18/2021 03:10 PM    Albumin 3.3 (L) 11/18/2021 03:10 PM    Globulin 2.7 11/18/2021 03:10 PM    A-G Ratio 1.2 11/18/2021 03:10 PM    ALT (SGPT) 29 11/18/2021 03:10 PM    AST (SGOT) 20 11/18/2021 03:10 PM       CT Results (most recent):  Results from Hospital Encounter encounter on 11/12/21    CT ABD PELV W CONT    Addendum 11/12/2021  2:13 PM  Addendum:    There is also a new subcentimeter sclerotic focus in T10--active on Nuclear Bone  Scan. Narrative  INDICATION:  Cholangiocarcinoma status post Whipple with metastases to lung and  bone, surveillance. COMPARISON: CT Abdomen Pelvis 10/15/2021. Chest CT 9/22/2021. EXAM: CT Chest, Abdomen and Pelvis.   CT dose reduction was achieved through the  use of a standardized protocol tailored for this examination and automatic  exposure control for dose modulation. CONTRAST: 100 mL Isovue 370 IV. CHEST:  The innumerable lung nodules/metastases have slightly increased in number and in  size. There is no significant mediastinal or hilar adenopathy. There is no pleural or pericardial effusion. There is aortic and coronary artery calcification without aneurysm. Central pulmonary arteries are free of thrombus. Visualized thyroid and lower neck soft tissues are unremarkable for age. ABDOMEN PELVIS:  Para-aortic retroperitoneal infiltrative tumor is unchanged--obstructing the  left renal vein, encasing the patent renal arteries, causing high-grade stenosis  of the SMA, with mild celiac artery narrowing, obstructing the splenic vein. Also causing left hydronephrosis at the UPJ level. There are extensive venous  collaterals. Stable splenomegaly. Status post Whipple. Pancreatic remnant is small, ill-defined, without duct  dilation. Bile ducts are patent with appropriate post Whipple pneumobilia. There is  unchanged hepatic regional hyperemia, without convincing metastasis. Portal  veins remain dilated without apparent thrombus. There is no inflammation, ascites, free air or significant adenopathy. Adrenal  glands are normal in size. Aorta is atherosclerotic without aneurysm. The  bladder is unremarkable. Bones show new small sclerotic focus in the right posterior acetabulum  correlating with new abnormality on nuclear bone scan. There is stable sclerotic  metastasis of the right sacrum and L5 kyphoplasty treated metastasis. Impression  1. Progression of lung metastases. 2. Stable retroperitoneal tumor causing vascular occlusions/stenoses and left  UPJ obstruction. 3. New sclerotic bone focus/metastasis. I personally reviewed the images. Progression of disease in the lungs      Assessment:     1.  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 Local recurrence in the para-aortic soft tissue - S/P SBRT     Recent CT shows progression of disease in the retroperitoneum, L4, lungs  The disease is unresectable. Treatment will in a palliative intent with a goal to extend life. Receiving palliative chemotherapy   Cis/[place]/Abraxane - s/p 6 cycles - last cycle 5/31/2021    CT - small progressive lung nodules  Too small for NCI/NIH trial  He may need another imaging of the pelvis for new and increased pain. 2. Cancer related pain     S/P celiac plexus block - done by Dr. Rush Rahman with significant improvement in the pain  Following with Palliative medicine. Taking Oxycodone twice a day and dilaudid 1 every 6 hours      3. Bone metastasis, L4    Pain is worse  S/P Ablation/Kyphoplasty - Dr. Rush Rahman  On Denosumab      5. Chemotherapy related neuropathy    Stable  Grade I       6. Atrial fibrillation    Cardiology      Plan:       1. Review images with Radiology  2. Follow up in 2 weeks      Signed by: Swapnil Gifford MD                     November 24, 2021      CC. Kasey Hurt MD  CC. Lola Mckeon MD  CC. Ronaldo Rodríguez MD  CC. Joshua Medina MD  CC.  Luanne Pisano MD

## 2021-11-15 NOTE — PROGRESS NOTES
Billie Ling. is a 73 y.o. male here for follow up for met cholangiocarcinoma. He had virtual visit with palliative today. Pt is having terrible pain on his lower left back and has been vomiting due to his pain. Pt states his pain level has not been below a 5 since Friday. Level 7 now but level 10 at night. He has lost 9 lbs since last visit. 1. Have you been to the ER, urgent care clinic since your last visit? Hospitalized since your last visit? 10/15/21 abdominal pain    2. Have you seen or consulted any other health care providers outside of the [address] [place] [address] since your last visit? Include any pap smears or colon screening.   no

## 2021-11-15 NOTE — PROGRESS NOTES
Palliative Medicine Office Visit  Palliative Medicine Nurse Check In  [phone] [phone]    Patient Name: Roz Nayak. YOB: 1955      Date of Office Visit: 11/15/2021  Patient states: \"  \"    From Check In Sheet (scanned in Media):  Has a medical provider talked with you about cardiopulmonary resuscitation (CPR)? [x] Yes   [] No   [] Unable to obtain    Nurse reminder to complete or update ACP FlowSheet:    Is ACP on the Problem List?    [x] Yes    [] No  IF ACP Document is ON FILE; Nurse to place ACP on Problem List     Is there an ACP Note in Chart Review/Note? [x] Yes    [] No   If NO: ALERT PROVIDER       Primary Decision MakerRjavier Carter - [phone]    Secondary Decision Maker: Ridge Cristina III - Child - [phone]    Supplemental (Other) Decision Maker: Thresa Collet Child - [phone]  Advance Care Planning 11/15/2021   Patient's [address] is: Named in scanned ACP document   Primary Decision Maker Name -   Primary Decision Maker Phone Number -   Primary Decision Maker Relationship to Patient -   Confirm Advance Directive Yes, on file   Patient Would Like to Complete Advance Directive -   Does the patient have other document types -         Is there anything that we should know about you as a person in order to provide you the best care possible? Have you been to the ER, urgent care clinic since your last visit? [] Yes   [x] No   [] Unable to obtain    Have you been hospitalized since your last visit? [] Yes   [x] No   [] Unable to obtain    Have you seen or consulted any other health care providers outside of the [address] [place] [address] since your last visit?    [] Yes   [x] No   [] Unable to obtain    Functional status (describe):       Last BM: 11/15/2021     accessed (date): 11/15/2021    Bottle review (for opioid pain medication):  Medication 1:   Date filled:   Directions:   # filled:   # left:   # pills taking per day:  Last dose taken:    Medication 2:   Date filled:   Directions:   # filled:   # left:   # pills taking per day:  Last dose taken:    Medication 3:   Date filled:   Directions:   # filled:   # left:   # pills taking per day:  Last dose taken:    Medication 4:   Date filled:   Directions:   # filled:   # left:   # pills taking per day:  Last dose taken:

## 2021-11-15 NOTE — LETTER
11/24/2021    Patient: Nikolai Trujillo. YOB: 1955   Date of Visit: 11/15/2021     Mary Ellen Peer III, DO  [address] [facility] [address]  [address] [facility]  [address] [place]    Dear Kaiden Villa,      Thank you for referring Mr. Mukesh Benedict to [address] [place] [address] for evaluation. My notes for this consultation are attached. If you have questions, please do not hesitate to call me. I look forward to following your patient along with you.       Sincerely,    Lorrie Abarca MD

## 2021-11-15 NOTE — PATIENT INSTRUCTIONS
Dear Renu Murry.,     It was a pleasure seeing you today by virtual video visit.     This is the plan we talked about:    -Disease discussion  -We reviewed your most recent CT and bone scan. There is progression of disease in the lung nodules. You are having more pain in your left lower back but this does not correlate with new bony mets in the left side. However, there is left hydronephrosis which is kidney swelling from the retroperitoneal tumor pushing on your renal vein. This could be the cause of your flank pain. Please call your urologist to make an appointment. You are also seeing Dr. Yvrose Crisostomo today.  -Start Dilaudid 2 mg every 3 hours as needed. Increase to 4 mg every 3 hours if the 2 mg not helping. You have not been on opioids in a while. .     -Chronic low back pain from new L5 met status post ablation and kyphoplasty  -This is resolved     -Chemo-induced diarrhea  -You do still have a lot of water in your stool. You have a bowel movement about 2-3 times in the day and 3-4 times at night. This is a lot better than it was before starting the opium tincture. -You are now on Creon and started colestipol 2 g. But this caused severe constipation and you ended up in the ER. Colestipol is currently on hold. I suggest that you try 500 mg of colestipol once your pain is under control and hold off on the opium tincture when you are trying colestipol.    -Sjogren's disease  -You are very careful to stay well hydrated. You drink water and sugar-free gatorade or powerade.     -Multivitamin deficiency  -You were found to have severe vitamin A, vitamin D, vitamin E deficiency. You are on replacement therapy now. You continue to follow with your PCP about this. You had labs drawn in August and have had your vitamin supplementation adjusted due to this.    -Fatigue  -Sometimes you cannot do what you want to do because of the tiredness of your legs.  You can get what you need to do done as long as you take breaks.    -Nausea  Your nausea has returned. I provided you with Compazine.    -Follow up  -W I will see you again in 4 weeks       reviewed and appropriate. Most recent CT scan reviewed and results discussed with patient. See results below in data portion of note.

## 2021-11-18 NOTE — PROGRESS NOTES
[facility] Visit Note    3723- Pt arrived at [facility] ambulatory and in no distress for Xgeva. Assessment completed, no new complaints voiced. Patient denies any recent or upcoming invasive dental work. Patient denied having any symptoms of COVID-19, i.e. SOB, coughing, fever, or generally not feeling well. Also denies having been exposed to COVID-19 recently or having had any recent contact with family/friend that has a pending COVID test.     Labs Obtained - Chem8, Mag, Phos, CMP, Ca 125 -- Calcium resulted low on Chem8, CMP sent to main lab  Recent Results (from the past 12 hour(s))   METABOLIC PANEL, COMPREHENSIVE    Collection Time: 11/18/21  3:10 PM   Result Value Ref Range    Sodium 136 136 - 145 mmol/L    Potassium 3.3 (L) 3.5 - 5.1 mmol/L    Chloride 105 97 - 108 mmol/L    CO2 25 21 - 32 mmol/L    Anion gap 6 5 - 15 mmol/L    Glucose 260 (H) 65 - 100 mg/dL    BUN 10 6 - 20 MG/DL    Creatinine 0.83 0.70 - 1.30 MG/DL    BUN/Creatinine ratio 12 12 - 20      GFR est AA >60 >60 ml/min/1.73m2    GFR est non-AA >60 >60 ml/min/1.73m2    Calcium 8.4 (L) 8.5 - 10.1 MG/DL    Bilirubin, total 0.6 0.2 - 1.0 MG/DL    ALT (SGPT) 29 12 - 78 U/L    AST (SGOT) 20 15 - 37 U/L    Alk.  phosphatase 176 (H) 45 - 117 U/L    Protein, total 6.0 (L) 6.4 - 8.2 g/dL    Albumin 3.3 (L) 3.5 - 5.0 g/dL    Globulin 2.7 2.0 - 4.0 g/dL    A-G Ratio 1.2 1.1 - 2.2     MAGNESIUM    Collection Time: 11/18/21  3:10 PM   Result Value Ref Range    Magnesium 2.3 1.6 - 2.4 mg/dL   PHOSPHORUS    Collection Time: 11/18/21  3:10 PM   Result Value Ref Range    Phosphorus 2.2 (L) 2.6 - 4.7 MG/DL   POC CHEM8    Collection Time: 11/18/21  3:22 PM   Result Value Ref Range    Calcium, ionized (POC) 1.07 (L) 1.12 - 1.32 mmol/L    Sodium (POC) 138 136 - 145 mmol/L    Potassium (POC) 3.4 (L) 3.5 - 5.1 mmol/L    Chloride (POC) 102 98 - 107 mmol/L    CO2 (POC) 26.1 21 - 32 mmol/L    Anion gap (POC) 11 10 - 20 mmol/L    Glucose (POC) 279 (H) 65 - 100 mg/dL    Creatinine (POC) 0.76 0.6 - 1.3 mg/dL    GFRAA, POC >60 >60 ml/min/1.73m2    GFRNA, POC >60 >60 ml/min/1.73m2    Comment Comment Not Indicated. Visit Vitals  /68   Pulse 96   Temp 98.8 °F (37.1 °C)   Resp 16   Wt 70.9 kg (156 lb 6.4 oz)   SpO2 97%   BMI 23.78 kg/m²       Medications received:  Medications Administered     denosumab (XGEVA) injection 120 mg     Admin Date  11/18/2021 Action  Given Dose  120 mg Route  SubCUTAneous Administered By  Garfield Wen RN          heparin (porcine) pf 500 Units     Admin Date  11/18/2021 Action  Given Dose  500 Units Route  IntraVENous Administered By  Garfield Wen RN          sodium chloride (NS) flush 5-10 mL     Admin Date  11/18/2021 Action  Given Dose  10 mL Route  IntraVENous Administered By  Garfield Wne RN              2090- Tolerated treatment well, no adverse reaction noted. D/Cd from [facility] ambulatory and in no distress accompanied by self.   Next appt 12/16/21

## 2021-11-19 NOTE — PROGRESS NOTES
[facility] Outreach    Call placed to to pt, no answer. Unable to leave a VM, mailbox full    Purpose of TC     F/U regarding ER visit on 10/15/21 X 2  F/U on backpain   F/U on infusion therapy    Diagnosis:      1. 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 Extrahepatic cholangiocarcinoma:  T3 N1 (1 of 12 LN +ve)  Invasion into pancreas  R0 resection      Goals       Attends follow-up appointments as directed. PCP - 11/10/21  Oncology - 11/24/21  Infusion - 11/18/21  Palliative - 12/30/21 11/19/21 call placed to pt, no answer. Mailbox full         Care Coordination related to Extrahepatic cholangiocarcinoma with metastasis to the lung (pt-stated)       Coordinate Pain Management Plan for Patient. Patient will adhere to pain management plan put in place by ED physician. Will discuss PT with PCP.   Supportive resources in place to maintain patient in the community (ie. Home Health, DME equipment, refer to, medication assistant plan, etc.)       [facility] - # [phone] 24/7  Nurse Access line 24/7  Be Well  [facility] Emily Book[employer]! [address] [phone]  HR Service Now - Henny  [facility] Workday  IT - [phone]  [facility] Help - 1- [phone]  Associate Services for advice and direction, by calling [phone] and pressing 1 or [email or web address]. [email or web address]  [employer] [facility] - [phone] Go to [employer] on the Internet or your mobile device and enter the password [email or web address] to access resources, educational information, and self-service options.             CM will f/u in 2 weeks

## 2021-11-23 NOTE — TELEPHONE ENCOUNTER
Outgoing call placed to patient and he reported that he continues to have break through pain with increase dose of Dilaudid. Inquired with patient when he started the increase dose (4 mg) - he stated that he took 4 mg Saturday night before bed. He is not taking 4 mg consistently - patient reported that he will take Dilaudid 2 mg which decreases his pain for about 2- 2.5 hours and then he will take an additional tablet before the 3 hour geremias. Patient reported taking Dilaudid 2 mg at 9 am this morning - his pain hen was 8/10, and now it is down to 4/10 but it's typically for the pain to increase before the 3 hour geremias. Advised based on MD last note to go ahead and increase Dilaudid to 4 mg every 3 hours consistently and see how that works for his pain.   Advised I will update MD with this information and call back if she wants to recommend long acting or wait to see how the increase Dilaudid works - patient verbalized understanding

## 2021-11-23 NOTE — PROGRESS NOTES
Halle Pope. is a 73 y.o. male here for follow up for met cholangio carcinoma. He was put on Azithromycin yesterday by his dentist.  He will be having a tooth pulled in about 6 weeks. 1. Have you been to the ER, urgent care clinic since your last visit? Hospitalized since your last visit? no    2. Have you seen or consulted any other health care providers outside of the [address] [place] [address] since your last visit? Include any pap smears or colon screening.  Dentist

## 2021-11-23 NOTE — TELEPHONE ENCOUNTER
----- Message from [place]. Tanya Gutiérrez. sent at 11/23/2021 10:09 AM EST -----  Regarding: Back Pain  Hi Dr. Esteban Faustin is taking He is taking hydromorphone 4 mg every 3 hours and having breakthrough pain. Do you think he needs a long acting pain medication? Thank you for your support.   Manish 60

## 2021-11-24 NOTE — LETTER
11/24/2021    Patient: Spike Meier. YOB: 1955   Date of Visit: 11/24/2021     Kartik Regulo III, DO  [address] [facility]  [address]  [facility]  Via In [place]    Dear Ashwin Anderson,      Thank you for referring Mr. Lashonda Tian to [address] [place] [address] for evaluation. My notes for this consultation are attached. If you have questions, please do not hesitate to call me. I look forward to following your patient along with you.       Sincerely,    Alberta Shook MD

## 2021-11-24 NOTE — PROGRESS NOTES
[address] [facility] [address] [facility], [address] [place] [address]  [phone]    Follow-up Note      Patient: Anna Mireles MRN: [id number]  SSN: [id number]    YOB: 1955  Age: 73 y.o. Sex: male        Diagnosis:     1. 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 S/P Pancreatoduodenectomy on  10/06/2017  2. Adjuvant monotherapy with Capecitabine - s/p 6 cycles   (12/4/2017 - 05/2018)  3. SBRT RP node/soft tissue 04/2020  4. Palliative chemotherapy   Cis/[place]/Abraxane - s/p 6 cycles - discontinued d/t progression of disease    Subjective:      Anna Mireles is a 73 y.o. male with a diagnosis of extrahepatic cholangiocarcinoma with metastatic to the lungs, bones and retroperitoneal node/soft tissue. He presented to the ED in August 2017 with obstructive jaundice. He was found to have an obstructive lesion in the dital bile duct. The CT showed marked intrahepatic biliary dilation and common bile duct dilation to the level of the mid common bile duct, which ws markedly narrowed. He underwent a stenting procedure followed by spyglass cholangiogram. The brushings revealed a diagnosis of cholangiocarcinoma. He underwent a Whipple procedure on 10/06/2017. He completed 6 cycles of adjuvant Xeloda. PET scan showed increased activity in the soft tissue along the SMA. CT guided biopsy showed local recurrence. He underwent SBRT by Dr. Amy Menezes in April 2020. He was admitted with severe back pain. Repeat CT shows growth of tumor in the L4/L5, lung and RP node/soft tissue. He underwent a CT guided celiac plexus block which has helped the back pain immensely. A biopsy of the L4 vertebrae confirms a diagnosis of metastatic cholangiocarcinoma.       Jonnie received palliative chemotherapy. He has been off of treatment since the end of May. He is here today to discuss the most recent scans. He is experiencing worsening pain in the right SI joint/sacral area. Review of Systems:    Constitutional: fatigue  Eyes: negative  Ears, Nose, Mouth, Throat, and Face: negative  Respiratory: negative  Cardiovascular: negative  Gastrointestinal: diarrhea (controlled with tincture of opium)  Genitourinary:negative  Integument/Breast: negative  Hematologic/Lymphatic: negative  Musculoskeletal: B/L ankle edema  Neurological: numbness/tingling B/L feet    Review of systems was reviewed and updated as needed on 11/24/21. Past Medical History:   Diagnosis Date    Aneurysm [facility]) 2008    CEREBRAL    Arrhythmia     Previous a.fib, ablation, NSR now; NO LONGER FOLLOWED BY CARDIOLOGIST.     Autoimmune disease ([facility] [address]     SJOGREN'S    Cancer ([facility] 2020    COMMON BILE DUCT, ADENOCARCINOMA, SPINE    Diabetes ([facility]     NIDDM    Family history of skin cancer     Hypertension     Sepsis ([facility] 2017    STENTING TO BILE DUCT    Status post chemotherapy     Stroke [facility]) 2008    brain aneurysm - no deficits    Sun-damaged skin     Sunburn, blistering      Past Surgical History:   Procedure Laterality Date    HX CHOLECYSTECTOMY      HX GI      COLONOSCOPY, POLYPS (BENIGN)    HX GI  10/06/2017    Viktor Land[facility]     HX GI  2020    WHIPPLE REVISION    HX HEART CATHETERIZATION  2005    Ablation     HX HERNIA REPAIR  12/2020    HERNIA REPAIR    HX ORTHOPAEDIC  2000    Spinal fusion W/ HARDWARE    HX OTHER SURGICAL  11/07/2017    [place] Cath, Dr. Ryan Warren  01/11/2021    RIGHT IJ PORT-A-CATH PLACEMENT     HX VASCULAR ACCESS  2017    PORTACATH - then removed    IR KYPHOPLASTY LUMBAR  1/12/2021    NEUROLOGICAL PROCEDURE UNLISTED  2005    Ting hole washout from cerebral hemorrhage    ME ABDOMEN SURGERY PROC UNLISTED  10/2017    WHIPPLE      Family History   Problem Relation Age of Onset    Cancer Father         Breast and Colon, MELANOMA    Hypertension Father     Cancer Mother         LEUKEMIA    No Known Problems Sister     Atrial Fibrillation Brother     No Known Problems Sister     No Known Problems Son     No Known Problems Son     Anesth Problems Neg Hx      Social History     Tobacco Use    Smoking status: Never Smoker    Smokeless tobacco: Never Used   Substance Use Topics    Alcohol use: Never     Comment: very rare      Prior to Admission medications    Medication Sig Start Date End Date Taking? Authorizing Provider   azithromycin (ZITHROMAX) 250 mg tablet  11/23/21  Yes Provider, Historical   prochlorperazine (Compazine) 10 mg tablet Take 0.5 Tablets by mouth every six (6) hours as needed for Nausea or Vomiting for up to 30 days. 11/15/21 12/15/21 Yes Evie Hernandez MD   HYDROmorphone (DILAUDID) 2 mg tablet Take 1 Tablet by mouth every three (3) hours as needed for Pain for up to 15 days. Max Daily Amount: 16 mg. 11/15/21 11/30/21 Yes Evie Hernandez MD   opium tincture 10 mg/mL (morphine) liquid Take 1 mL by mouth every six (6) hours as needed for Diarrhea for up to 30 days. Max Daily Amount: 4 mL. 10/29/21 11/28/21 Yes Queta Mars MD   diphenoxylate-atropine (LomotiL) 2.5-0.025 mg per tablet Take 1 Tablet by mouth four (4) times daily as needed for Diarrhea. Max Daily Amount: 4 Tablets. 10/28/21  Yes Queta Mars MD   ondansetron (Zofran ODT) 4 mg disintegrating tablet Take 1 Tablet by mouth every eight (8) hours as needed for Nausea. 10/15/21  Yes Emili Leahy MD   dicyclomine (BENTYL) 10 mg capsule Take 1 Capsule by mouth three (3) times daily as needed for Abdominal Cramps.  10/15/21  Yes Emili Leahy MD   Creon 36,000-114,000- 180,000 unit cpDR capsule TAKE TWO TO THREE CAPSULES BY MOUTH WITH EACH MEAL AND ONE CAPSULE FOR SNACKS DAILY 10/14/21  Yes Tevin Devries, DO   colestipoL (COLESTID) 1 gram tablet  9/27/21  Yes Provider, Historical   cyanocobalamin (VITAMIN B12) 1,000 mcg/mL injection 1 mL by IntraMUSCular route every seven (7) days. 8/12/21  Yes Silas Raya III, DO   calcium carbonate (CALCIUM 500 PO) Take  by mouth. Yes Provider, Historical   azelastine (ASTEPRO) 205.5 mcg (0.15 %) 2 Sprays by Both Nostrils route two (2) times a day. 7/16/21  Yes Charisma Steel MD   vitamin A (AQUASOL A) 10,000 unit capsule TAKE 1 CAPSULE BY MOUTH ONE TIME A DAY 6/16/21  Yes Silas Raya III,    OLANZapine (ZyPREXA) 10 mg tablet Take 1 Tab by mouth See Admin Instructions. Take nightly for 4 days starting the evening of chemotherapy. 1/29/21  Yes Arpita Atwood MD   pantoprazole (PROTONIX) 40 mg tablet TAKE 1 TABLET BY MOUTH EVERY DAY 1/11/21  Yes Meghana Frederick MD   lidocaine-prilocaine (EMLA) topical cream Apply  to affected area as needed for Pain. 30-45 min prior to treatments 1/7/21  Yes Arpita Atwood MD   dexAMETHasone (DECADRON) 1 mg tablet Take 2 tablets by mouth twice daily for 14 days 12/31/20  Yes Syed Gutierrez MD   dutasteride (AVODART) 0.5 mg capsule Take 1 Cap by mouth daily. 12/21/20  Yes Silas Raya III, DO   naloxone (NARCAN) 4 mg/actuation nasal spray Use 1 spray intranasally, then discard. Repeat with new spray every 2 min as needed for opioid overdose symptoms, alternating nostrils. 12/11/20  Yes Lex Russo MD   aluminum & magnesium hydroxide-simethicone (Maalox Maximum Strength) 400-400-40 mg/5 mL suspension Take 10 mL by mouth every six (6) hours as needed for Indigestion. Yes Provider, Historical   vitamin E (AQUA GEMS) 400 unit capsule Take 1 Cap by mouth daily. 10/26/20  Yes Silas Raya III,    empagliflozin (Jardiance) 25 mg tablet Take 1 Tab by mouth nightly. 10/20/20  Yes Czajkowski, Silas B III, DO   gabapentin (NEURONTIN) 100 mg capsule Take 3 capsules by mouth twice a day.  10/20/20  Yes Steffany Gomes, DO   tamsulosin (FLOMAX) 0.4 mg capsule TAKE ONE CAPSULE BY MOUTH EVERY EVENING 10/20/20  Yes Silas Raya III, DO   ramipriL (ALTACE) 5 mg capsule Take 1 Cap by mouth daily. Patient taking differently: Take 5 mg by mouth nightly. 10/20/20  Yes Silsa Raya III, DO   lidocaine (LIDODERM) 5 % 1 Patch by TransDERmal route every twenty-four (24) hours. Apply patch to the affected area for 12 hours a day and remove for 12 hours a day. 10/15/20  Yes Silas Raya III, DO   loperamide (IMODIUM) 2 mg capsule Take 2-4 mg by mouth as needed for Diarrhea. Give 4 mg after first loose stool. Give an additional 2 mg after each loose stool as needed up to a maximum of 8 doses (16 mg/day). Yes Provider, Historical   finasteride (PROSCAR) 5 mg tablet TAKE 1 TABLET BY MOUTH EVERY DAY 4/13/20  Yes Provider, Historical   acetaminophen (TYLENOL) 325 mg tablet Take 2 Tabs by mouth every four (4) hours as needed for Pain or Fever (Over the counter medication). 1/2/20  Yes Ludmila Rollins NP   alpha-D-galactosidase (BEANO PO) Take 1 Tab by mouth two (2) times a day. Yes Other, MD Flynn   cetirizine (ZYRTEC) 10 mg tablet Take 10 mg by mouth nightly. Yes Provider, Historical          Allergies   Allergen Reactions    Shellfish Derived Anaphylaxis     Soft shell crabs    Morphine Nausea and Vomiting    Pcn [Penicillins] Other (comments)     Pt stated \"always been told PCN\"  Pt tolerated other cephalosporins in the past           Objective:     Visit Vitals  /63   Pulse 85   Temp 98.2 °F (36.8 °C)   Ht 5' 8\" (1.727 m)   Wt 152 lb 9.6 oz (69.2 kg)   SpO2 96%   BMI 23.20 kg/m²     Pain Scale: /10      Physical Exam:     GENERAL: alert, cooperative, no distress, appears stated age  EYE: negative  LYMPHATIC: Cervical, supraclavicular, and axillary nodes normal.   THROAT & NECK: normal and no erythema or exudates noted.    LUNG: clear to auscultation bilaterally  HEART: irregularly, irregular rythm  ABDOMEN: soft, non-tender  EXTREMITIES: trace ankle edema  SKIN: Scabs on top of head  NEUROLOGIC: Numbness in fingertips and feet - mild    The above physical exam was reviewed and updated as needed on 11/24/21. Lab Results   Component Value Date/Time    WBC 5.1 11/02/2021 11:06 AM    Hemoglobin (POC) 10.3 (L) 10/06/2017 01:14 PM    HGB 12.0 (L) 11/02/2021 11:06 AM    Hematocrit (POC) 28 (L) 05/13/2021 09:16 AM    HCT 37.7 11/02/2021 11:06 AM    PLATELET 57 (L) 22/43/5506 11:06 AM    MCV 91.3 11/02/2021 11:06 AM       Lab Results   Component Value Date/Time    Sodium 136 11/18/2021 03:10 PM    Potassium 3.3 (L) 11/18/2021 03:10 PM    Chloride 105 11/18/2021 03:10 PM    CO2 25 11/18/2021 03:10 PM    Anion gap 6 11/18/2021 03:10 PM    Glucose 260 (H) 11/18/2021 03:10 PM    BUN 10 11/18/2021 03:10 PM    Creatinine 0.83 11/18/2021 03:10 PM    BUN/Creatinine ratio 12 11/18/2021 03:10 PM    GFR est AA >60 11/18/2021 03:10 PM    GFR est non-AA >60 11/18/2021 03:10 PM    Calcium 8.4 (L) 11/18/2021 03:10 PM    Bilirubin, total 0.6 11/18/2021 03:10 PM    Alk. phosphatase 176 (H) 11/18/2021 03:10 PM    Protein, total 6.0 (L) 11/18/2021 03:10 PM    Albumin 3.3 (L) 11/18/2021 03:10 PM    Globulin 2.7 11/18/2021 03:10 PM    A-G Ratio 1.2 11/18/2021 03:10 PM    ALT (SGPT) 29 11/18/2021 03:10 PM    AST (SGOT) 20 11/18/2021 03:10 PM       CT Results (most recent):  Results from Hospital Encounter encounter on 11/12/21    CT ABD PELV W CONT    Addendum 11/12/2021  2:13 PM  Addendum:    There is also a new subcentimeter sclerotic focus in T10--active on Nuclear Bone  Scan. Narrative  INDICATION:  Cholangiocarcinoma status post Whipple with metastases to lung and  bone, surveillance. COMPARISON: CT Abdomen Pelvis 10/15/2021. Chest CT 9/22/2021. EXAM: CT Chest, Abdomen and Pelvis.   CT dose reduction was achieved through the  use of a standardized protocol tailored for this examination and automatic  exposure control for dose modulation. CONTRAST: 100 mL Isovue 370 IV. CHEST:  The innumerable lung nodules/metastases have slightly increased in number and in  size. There is no significant mediastinal or hilar adenopathy. There is no pleural or pericardial effusion. There is aortic and coronary artery calcification without aneurysm. Central pulmonary arteries are free of thrombus. Visualized thyroid and lower neck soft tissues are unremarkable for age. ABDOMEN PELVIS:  Para-aortic retroperitoneal infiltrative tumor is unchanged--obstructing the  left renal vein, encasing the patent renal arteries, causing high-grade stenosis  of the SMA, with mild celiac artery narrowing, obstructing the splenic vein. Also causing left hydronephrosis at the UPJ level. There are extensive venous  collaterals. Stable splenomegaly. Status post Whipple. Pancreatic remnant is small, ill-defined, without duct  dilation. Bile ducts are patent with appropriate post Whipple pneumobilia. There is  unchanged hepatic regional hyperemia, without convincing metastasis. Portal  veins remain dilated without apparent thrombus. There is no inflammation, ascites, free air or significant adenopathy. Adrenal  glands are normal in size. Aorta is atherosclerotic without aneurysm. The  bladder is unremarkable. Bones show new small sclerotic focus in the right posterior acetabulum  correlating with new abnormality on nuclear bone scan. There is stable sclerotic  metastasis of the right sacrum and L5 kyphoplasty treated metastasis. Impression  1. Progression of lung metastases. 2. Stable retroperitoneal tumor causing vascular occlusions/stenoses and left  UPJ obstruction. 3. New sclerotic bone focus/metastasis. I personally reviewed the images. Progression of disease in the lungs      Assessment:     1.  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 Local recurrence in the para-aortic soft tissue - S/P SBRT     Recent CT shows progression of disease in the retroperitoneum, L4, lungs  The disease is unresectable. Treatment will in a palliative intent with a goal to extend life. Receiving palliative chemotherapy   Cis/[place]/Abraxane - s/p 6 cycles - last cycle 5/31/2021    CT - small progressive lung nodules  Too small for NCI/NIH trial  Pain in the right hip is worse  Obtain MRI pelvis      2. Cancer related pain     S/P celiac plexus block - done by Dr. Jeremiah La with significant improvement in the pain  Following with Palliative medicine. Taking Oxycodone twice a day and dilaudid 1 every 6 hours      3. Bone metastasis, L4    Pain is worse  S/P Ablation/Kyphoplasty - Dr. Jeremiah La  On Denosumab      5. Chemotherapy related neuropathy    Stable  Grade I       6. Atrial fibrillation    Cardiology      Plan:       1. MRI pelvis  2. Follow up in 2 months      Signed by: Amirah Dukes MD                     November 24, 2021      CC. Tanesha Rocha MD  CC. Wan Alfred MD  CC. Marielle Kevin MD  CC. Michel Reis MD  CC.  Blair Kevin MD

## 2021-11-29 NOTE — TELEPHONE ENCOUNTER
----- Message from [place]. Marianna Harada. sent at 11/28/2021  5:06 PM EST -----  Regarding: RE: Pain Medication  Hello Dr. Ramirez Stands,  I have been taking 2 -2mg tabs of hydromorphone every 3 hours. I am getting relief with the medication and the heating pad. I have 8 tablet left. I use [facility].   Thank you,  Rodrick Sampson

## 2021-11-29 NOTE — TELEPHONE ENCOUNTER
Triage for Controlled Substance Refill Request    Pain Diagnosis: _Cholangiocarcinoma of biliary tract ([facility] (C22.1)    Last Outpatient Visit: _11/15/2021    Next Outpatient Visit: _12/14/2021    Reason for refill needed outside of office visit?  -Pain escalation requiring increased medication      Pharmacy: _[facility] [address]        Medication:HYDROmorphone (DILAUDID) 2 mg      Dose and directions: Take 1 Tablet by mouth every three (3) hours  Number dispensed:120  Date filled ( or Pharmacy):11/15/2021  #left:8     reviewed: _yes    Date of Urine Drug Screen:  _n/a    Opioid Safety Handout given:  _yes    Appropriate for refill:  _yes    Action:  _ please refill   \"-Start Dilaudid 2 mg every 3 hours as needed. Increase to 4 mg every 3 hours if the 2 mg not helping. You have not been on opioids in a while. Hitesh Chisholm \"

## 2021-12-03 NOTE — TELEPHONE ENCOUNTER
----- Message from [place]. Nelda Peabody. sent at 12/3/2021  1:43 PM EST -----  Regarding: RE: Pain Medication  Orion Camilo has a area on this lower left back(pelvic region) that Dr. Brayton Leventhal thinks is a bone met site from the cholangiocarcinoma. Dr Jami Chirinos thought it may be kidney involvement due to reflux. Dr. Margarita Morales says it is from bone mets. We can not get an MRI until 12/12/21. We hope Dr Ena Huff can before a Kyphoplasty of the area ASAP. Rohit had a kyroplasty of L 5 -S1 in December when he was first diagnosed with stage 4  cholangiocarcinoma. He is taking 2 tablets of the hydromorphone every three hours. [id number] 6pm-9pm.  He has had pain from bone mets in the past and required Oxycontin SR  20 mg q d and Fentanyl patch 37.5 mg.  I am afraid we will end up in the ED if this  pain continues. We use [address] [place] [address]. Thank you for your support.   Manish 60

## 2021-12-03 NOTE — TELEPHONE ENCOUNTER
Returned call to patient - advised that I spoke with Dr. Jae Mccullough and she has reviewed his chart and has ordered OxyContin 20 mg to take every 12 hours, along with Dexamethasone 4 mg 1 tab every morning x 5 days, and to continue Dilaudid 4 mg every 3 hours as prescribed. Patient verbalized understanding and will contact his wife to arrange pickup before pharmacy closes. This nurse spoke with Conchis Snider, pharmacist and she stated that a pre-auth is not required, but she only has 40 OxyContin in stock. Advise ok to dispense the 40 mg, and if he continues on this medication we will send new script when he is due.     This nurse will follow up with patient on Monday    Next PM visit is schedule for 12/14/21 with Dr. Dami Nielsen

## 2021-12-03 NOTE — TELEPHONE ENCOUNTER
----- Message from [place]. Justen Claros. sent at 12/3/2021  3:05 PM EST -----  Regarding: RE: Pain Medication  I am afraid that the [facility] will be closed and we will not be able to get medication to keep us out of the ED. Can you send the medication request to [facility] in [place] on [address] [phone].   Thank you,  Griselda Wolff

## 2021-12-06 NOTE — TELEPHONE ENCOUNTER
Triage for Controlled Substance Refill Request    Pain Diagnosis:    -Chronic low back pain from new L5 met status post ablation and kyphoplasty    Last Outpatient Visit:  11/15/21    Next Outpatient Visit: 12/14/21    Reason for refill needed outside of office visit?  Appointment not scheduled prior to need for scheduled refill    Pharmacy:  [facility]    Medication: Dilaudid 4 mg   Dose and directions: 1 tab every 3 hours as needed  Number dispensed: 120  Date filled ( or Pharmacy): 11/15/21  # left:     reviewed: Yes    Date of Urine Drug Screen:  n/a    Opioid Safety Handout given:  yes    Appropriate for refill: Yes    Action: Please sign

## 2021-12-08 NOTE — TELEPHONE ENCOUNTER
Shara with [facility] is calling for the followin)  Hydromorphone (dilaudid)  Wife states that patient takes 2 tablets by a day, not one.      2)  Opium tincture - needs new script. Gabriella Marley would like for nurse to send script for opium tincture and to clarify directions/amount on Dilaudid.

## 2021-12-08 NOTE — TELEPHONE ENCOUNTER
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  -Pain escalation requiring increased medication      Pharmacy: _[facility] [address]    Medication:opium tincture 10 mg/mL (morphine) liquid      Dose and directions: Take 1 mL by mouth every six (6) hours  Number dispensed:118 ml  Date filled ( or Pharmacy):10/29/2021  # left:    Medication:HYDROmorphone (DILAUDID) 4 mg       Dose and directions: Take 1 Tablet by mouth every three (3) hours   Number dispensed:120  Date filled ( or Pharmacy):  #left:     reviewed: _yes    Date of Urine Drug Screen:  _n/a    Opioid Safety Handout given:  _yes    Appropriate for refill:  _yes    Action:  _ yes

## 2021-12-10 NOTE — TELEPHONE ENCOUNTER
Calling patient to cancel appt due to Dr. Jackson Arechiga is on Medical Leave. We will call you back at a later date to reschedule. If you have refill request or urgent medical needs, please call the office and leave message for nurse.

## 2021-12-14 NOTE — TELEPHONE ENCOUNTER
Calling patient trying to reschedule him for 12/21/2021 at 9:30am for a VV with Dr. Maher . No answer so left voicemail. Calling Mrs. Cristina, appt r/s to 12/20/2021 at 8:30am for a VV. Patient had conflicts for Monday.

## 2021-12-16 NOTE — TELEPHONE ENCOUNTER
Calling Ms. rCistina to r/s patient's appt from 12/20 to 12/21 per nurse.   No answer so left voicemail asking if patient could do 12/21/2021 at 12:30pm.  Will await answer

## 2021-12-20 NOTE — TELEPHONE ENCOUNTER
[facility] to follow up on referral request. Patient's wife sent [employer] message. They were sending a message to Dr Sydna Mortimer team, and advised they would call back with follow up. Also requested patient's phone number in case team wanted to reach out to patient.

## 2021-12-20 NOTE — TELEPHONE ENCOUNTER
Returned call to pt. Let him know a call will be made to him by the end of the day. He is appreciative, pain meds at home but he is still not comfortable.

## 2021-12-21 NOTE — PROGRESS NOTES
Palliative Medicine Outpatient Services  Jesus: [phone] ([phone])    Patient Name: Kim Milian. YOB: 1955    Date of Current Visit: 12/27/21  Location of Current Visit:    [] [facility] Office  [] [facility] Office  [] [address] [place] [address]  [] Home  [x] Other: Virtual synchronous A/V visit    Date of Initial Visit: 4/6/2020  Referral from: Dr. Lauren Haines  Primary Care Physician: Madalyn Montes DO      SUMMARY:   Kim Pillai is a 73y.o. 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 Lauren Haines for management of symptoms and psychosocial support. 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 This is second marriage for both of them. Current treatment-completed RT to the para-aortic mass, pursuing diagnostics with GI physician Dr. Lamont Bumpers for gastroparesis and pancreatic enzymes, has had biliary stents replaced. Status post celiac plexus block and reversal of Whipple procedure on 9/9/2020    Hospitalization at [facility] for uncontrolled pain in December 2020    L5 biopsy, ablation and kyphoplasty on 1/12/2021    Current treatment-on cisplatin plus gemcitabine plus Abraxane, chemotherapy currently on hold  chemotherapy versus functional diarrhea     PALLIATIVE DIAGNOSES:       ICD-10-CM ICD-9-CM    1. Primary osteoarthritis of both hips  M16.0 715.15 lidocaine (LIDODERM) 5 %   2.  Cholangiocarcinoma of biliary tract (HCC)  C22.1 155.1 methadone (DOLOPHINE) 10 mg tablet      HYDROmorphone (DILAUDID) 8 mg tablet   3. Pain from bone metastases (HCC)  G89.3 338.3     C79.51     4. Anorexia  R63.0 783.0           PLAN:   Patient Instructions   Dear Rajinder Found.,     It was a pleasure seeing you today by virtual video visit.     This is the plan we talked about:    -Disease discussion  -We reviewed your most recent CT and bone scan. There is progression of disease in the lung nodules. You are having more pain in your left lower back but this does not correlate with new bony mets in the left side. However, there is left hydronephrosis which is kidney swelling from the retroperitoneal tumor pushing on your renal vein. This could be the cause of your flank pain. Please call your urologist to make an appointment. .     -Chronic low back pain from new L5 met status post ablation and kyphoplasty, new and progressive right hip pain from sacral met  -This pain is very uncontrolled  -You are taking Dilaudid 8 mg every 3 hours day and night and still the pain is not controlled. We added OxyContin 20 mg 2 times a day but it did not make a difference. Given that you have such severe pain from the bone I would like to discontinue OxyContin and start you on methadone.  -Stop OxyContin  -Start methadone 10 mg 2 times a day for 7 days and then increase to 10 mg 3 times a day  -I have sent a prescription for Dilaudid 8 mg tablet every 3 hours as needed  -Apply lidocaine 5% patch locally which helps  -Referral to interventional radiology to see if osteocool is an option. You have an appointment today.     -Chemo-induced diarrhea  -This is resolved. You are no longer on tincture of opium.    -Sjogren's disease  -You are very careful to stay well hydrated. You drink water and sugar-free gatorade or powerade.     -Multivitamin deficiency  -You were found to have severe vitamin A, vitamin D, vitamin E deficiency. You are on replacement therapy now. You continue to follow with your PCP about this.  You had labs drawn in August and have had your vitamin supplementation adjusted due to this.    -Fatigue  -Sometimes you cannot do what you want to do because of the tiredness of your legs. You can get what you need to do done as long as you take breaks.    -Nausea  Your nausea has returned. I provided you with Compazine.    -Follow up  -W I will see you again in 4 weeks       reviewed and appropriate. Most recent CT scan reviewed and results discussed with patient. See results below in data portion of note. GOALS OF CARE / TREATMENT PREFERENCES:   [====Goals of Care====]  GOALS OF CARE:  Patient / health care proxy stated goals: See Patient Instructions / Summary    TREATMENT PREFERENCES:   Code Status:  [x] Attempt Resuscitation       [] Do Not Attempt Resuscitation    Advance Care Planning:  [x] The [employer] Interdisciplinary Team has updated the ACP Navigator with Decision Maker and Patient Capacity      Primary Decision MakerNaavlery Kayter - [phone]    Secondary Decision Maker: Ridge Cristina III - Child - [phone]    Supplemental (Other) Decision Maker: Celio Lals Child - [phone]  Advance Care Planning 2021   Patient's Healthcare Decision Maker is: Named in scanned ACP document   Primary Decision Maker Name -   Primary Decision Maker Phone Number -   Primary Decision Maker Relationship to Patient -   Confirm Advance Directive Yes, on file   Patient Would Like to Complete Advance Directive -   Does the patient have other document types -       Other:  (If patient appropriate for POST, consider using PALLPOST smart phrase here)    The palliative care team has discussed with patient / health care proxy about goals of care / treatment preferences for patient.  [====Goals of Care====]     PRESCRIPTIONS GIVEN:     Medications Ordered Today   Medications    lidocaine (LIDODERM) 5 %     Si Patch by TransDERmal route every twenty-four (24) hours.  Apply patch to the affected area for 12 hours a day and remove for 12 hours a day. Dispense:  1 Each     Refill:  5    methadone (DOLOPHINE) 10 mg tablet     Sig: Take 1 Tablet by mouth every eight (8) hours for 30 days. Max Daily Amount: 30 mg. Dispense:  90 Tablet     Refill:  0    HYDROmorphone (DILAUDID) 8 mg tablet     Sig: Take 1 Tablet by mouth every three (3) hours as needed for Pain for up to 15 days. Max Daily Amount: 64 mg. Dispense:  120 Tablet     Refill:  0           FOLLOW UP:     Future Appointments   Date Time Provider Sloane Roach   1/19/2022  1:30 PM Meghana Frederick MD [facility] BS AMB   2/10/2022  3:00 PM Param David 4 [address] REG   2/10/2022  3:15 PM Russ Sheth NP [facility]           PHYSICIANS INVOLVED IN CARE:   Patient Care Team:  Steffany Gomes DO as PCP - General (Internal Medicine)  tSeffany Gomes DO as PCP - [facility] Empaneled Provider  Lex Russo MD (General Surgery)  Betzy Orozco MD (Gastroenterology)  Lottie Hood MD (Gastroenterology)  Arpita Atwood MD (Hematology and Oncology)  Meghana Frederick MD as Palliative Care (Palliative Medicine)  Dahlia Faria RN as Benefits Care Manager       HISTORY:   Reviewed patient-completed ESAS and advance care planning form. Reviewed patient record in prescription monitoring program.    CHIEF COMPLAINT:   Chief Complaint   Patient presents with    Follow-up       HPI/SUBJECTIVE:    The patient is: [x] Verbal / [] Nonverbal     Patient complains of new left lower back pain and flank pain. He has been struggling with this for a few weeks now and ended up in the ER twice. See plan for more details  -------    Patient here with his wife. Both are very good historians. Mid upper back pain-much improved since radiation to the periaortic mass. He struggled with pain for several months before it was identified as cancer recurrence. He was started on fentanyl 25 mcg patch which he wants to wean off of.     He has made upper and mid zone abdominal pain which comes and goes. He has not noticed any specific pattern to the pain but usually the pain comes on before vomiting or relieves without vomiting. Sometimes, he vomits food that he ate about 12 to 14 hours ago. No constipation. He has 2 liquid bowel movements every day. He is unable to tolerate eggs or honey. He is getting tests done to evaluate his pancreatic enzymes. He has very good support through his wife. Clinical Pain Assessment (nonverbal scale for nonverbal patients):   [++++ Clinical Pain Assessment++++]  [++++Pain Severity++++]: Pain: 6  [++++Pain Character++++]: cramping  [++++Pain Duration++++]: months  [++++Pain Effect++++]: functional   [++++Pain Factors++++]: none in particular  [++++Pain Frequency++++]: on and off  [++++Pain Location++++]: upper abdomen and mid back  [++++ Clinical Pain Assessment++++]       FUNCTIONAL ASSESSMENT:     Palliative Performance Scale (PPS):  PPS: 100       PSYCHOSOCIAL/SPIRITUAL SCREENING:     Any spiritual / Latter-day concerns:  [] Yes /  [x] No    Caregiver Burnout:  [] Yes /  [x] No /  [] No Caregiver Present      Anticipatory grief assessment:   [x] Normal  / [] Maladaptive       ESAS Anxiety: Anxiety: 0    ESAS Depression: Depression: 0       REVIEW OF SYSTEMS:     The following systems were [x] reviewed / [] unable to be reviewed  Systems: constitutional, ears/nose/mouth/throat, respiratory, gastrointestinal, genitourinary, musculoskeletal, integumentary, neurologic, psychiatric, endocrine. Positive findings noted below. Modified ESAS Completed by: provider   Fatigue: 5 Drowsiness: 3   Depression: 0 Pain: 6   Anxiety: 0 Nausea: 3   Anorexia: 3 Dyspnea: 3   Best Well-Bein Constipation: No              PHYSICAL EXAM:     Wt Readings from Last 3 Encounters:   21 145 lb (65.8 kg)   21 152 lb 9.6 oz (69.2 kg)   21 156 lb 6.4 oz (70.9 kg)     There were no vitals taken for this visit.   Last bowel movement: See Nursing Note    Constitutional    [x] Appears well-developed and well-nourished in no apparent distress    [] Abnormal:  Mental status  [x] Alert and awake  [x] Oriented to person/place/time  [x] Able to follow commands  [] Abnormal:   Eyes  [x] EOM normal   [x] Sclera normal   [x] No visible ocular discharge  [] Abnormal:   HENT  [x] Normocephalic, atraumatic  [x] Mouth/Throat: Moist mucous membranes   [x] External Ears normal  [] Abnormal:  Neck  [x] No visualized mass  [] Abnormal:  Pulmonary/Chest   [x] Respiratory effort normal  [x] No visualized signs of difficulty breathing or respiratory distress  [] Abnormal:  Musculoskeletal  [x] Normal gait with no signs of ataxia  [x] Normal range of motion of neck  [] Abnormal:  Neurological:   [x] No facial asymmetry (Cranial nerve 7 motor function)  [x] No gaze palsy  [] Abnormal:   Skin  [x] No significant exanthematous lesions or discoloration noted on facial skin  [] Abnormal:                                  Psychiatric  [x] Normal affect  [x] No hallucinations  [] Abnormal:    Other pertinent observable physical exam findings:    Due to this being a TeleHealth evaluation, many elements of the physical examination are unable to be assessed. HISTORY:     Past Medical History:   Diagnosis Date    Aneurysm ([facility] 2008    CEREBRAL    Arrhythmia     Previous a.fib, ablation, NSR now; NO LONGER FOLLOWED BY CARDIOLOGIST.     Autoimmune disease ([facility] [facility]     SJOGREN'S    Cancer ([facility] [address] [facility] 2020    COMMON BILE DUCT, ADENOCARCINOMA, SPINE    Diabetes ([address]     NIDDM    Family history of skin cancer     Hypertension     Sepsis ([facility] [address] [facility] 2017    STENTING TO BILE DUCT    Status post chemotherapy     Stroke [facility]) 2008    brain aneurysm - no deficits    Sun-damaged skin     Sunburn, blistering       Past Surgical History:   Procedure Laterality Date    HX CHOLECYSTECTOMY      HX GI      COLONOSCOPY, POLYPS (BENIGN)    HX GI  10/06/2017    Whipple Dr. Sher Levine  2005    Ablation     HX HERNIA REPAIR  12/2020    HERNIA REPAIR    HX ORTHOPAEDIC  2000    Spinal fusion W/ HARDWARE    HX OTHER SURGICAL  11/07/2017    [place] Cath, Dr. Lexi Giang HX OTHER SURGICAL  01/11/2021    RIGHT IJ PORT-A-CATH PLACEMENT     HX VASCULAR ACCESS  2017    PORTACATH - then removed    IR ABLATE BONE TUMOR PERC W CRYO  12/23/2021    IR KYPHOPLASTY LUMBAR  1/12/2021    NEUROLOGICAL PROCEDURE UNLISTED  2005    Ting hole washout from cerebral hemorrhage    OK ABDOMEN SURGERY PROC UNLISTED  10/2017    APRIL      Family History   Problem Relation Age of Onset    Cancer Father         Breast and Colon, MELANOMA    Hypertension Father     Cancer Mother         LEUKEMIA    No Known Problems Sister     Atrial Fibrillation Brother     No Known Problems Sister     No Known Problems Son     No Known Problems Son     Anesth Problems Neg Hx       History reviewed, no pertinent family history. Social History     Tobacco Use    Smoking status: Never Smoker    Smokeless tobacco: Never Used   Substance Use Topics    Alcohol use: Never     Comment: very rare     Allergies   Allergen Reactions    Shellfish Derived Anaphylaxis     Soft shell crabs    Morphine Nausea and Vomiting    Pcn [Penicillins] Other (comments)     Pt stated \"always been told PCN\"  Pt tolerated other cephalosporins in the past      Current Outpatient Medications   Medication Sig    ferrous sulfate 325 mg (65 mg iron) tablet Take 325 mg by mouth Daily (before breakfast).  lidocaine (LIDODERM) 5 % 1 Patch by TransDERmal route every twenty-four (24) hours. Apply patch to the affected area for 12 hours a day and remove for 12 hours a day.  methadone (DOLOPHINE) 10 mg tablet Take 1 Tablet by mouth every eight (8) hours for 30 days.  Max Daily Amount: 30 mg.    HYDROmorphone (DILAUDID) 8 mg tablet Take 1 Tablet by mouth every three (3) hours as needed for Pain for up to 15 days. Max Daily Amount: 64 mg.  dutasteride (AVODART) 0.5 mg capsule TAKE ONE CAPSULE BY MOUTH DAILY    tamsulosin (FLOMAX) 0.4 mg capsule TAKE 1 CAPSULE BY MOUTH ONCE EVERY EVENING    oxyCODONE ER (OxyCONTIN) 20 mg ER tablet Take 1 Tablet by mouth every twelve (12) hours for 30 days. Max Daily Amount: 40 mg.    dexAMETHasone (DECADRON) 4 mg tablet Take 4 mg by mouth daily (with breakfast).  ondansetron (Zofran ODT) 4 mg disintegrating tablet Take 1 Tablet by mouth every eight (8) hours as needed for Nausea.  dicyclomine (BENTYL) 10 mg capsule Take 1 Capsule by mouth three (3) times daily as needed for Abdominal Cramps.  Creon 36,000-114,000- 180,000 unit cpDR capsule TAKE TWO TO THREE CAPSULES BY MOUTH WITH EACH MEAL AND ONE CAPSULE FOR SNACKS DAILY    colestipoL (COLESTID) 1 gram tablet     cyanocobalamin (VITAMIN B12) 1,000 mcg/mL injection 1 mL by IntraMUSCular route every seven (7) days.  calcium carbonate (CALCIUM 500 PO) Take  by mouth.  azelastine (ASTEPRO) 205.5 mcg (0.15 %) 2 Sprays by Both Nostrils route two (2) times a day.  vitamin A (AQUASOL A) 10,000 unit capsule TAKE 1 CAPSULE BY MOUTH ONE TIME A DAY    OLANZapine (ZyPREXA) 10 mg tablet Take 1 Tab by mouth See Admin Instructions. Take nightly for 4 days starting the evening of chemotherapy.  lidocaine-prilocaine (EMLA) topical cream Apply  to affected area as needed for Pain. 30-45 min prior to treatments    aluminum & magnesium hydroxide-simethicone (Maalox Maximum Strength) 400-400-40 mg/5 mL suspension Take 10 mL by mouth every six (6) hours as needed for Indigestion.  vitamin E (AQUA GEMS) 400 unit capsule Take 1 Cap by mouth daily.  empagliflozin (Jardiance) 25 mg tablet Take 1 Tab by mouth nightly.  gabapentin (NEURONTIN) 100 mg capsule Take 3 capsules by mouth twice a day.  ramipriL (ALTACE) 5 mg capsule Take 1 Cap by mouth daily.  (Patient taking differently: Take 5 mg by mouth nightly.)    lidocaine (LIDODERM) 5 % 1 Patch by TransDERmal route every twenty-four (24) hours. Apply patch to the affected area for 12 hours a day and remove for 12 hours a day.  finasteride (PROSCAR) 5 mg tablet TAKE 1 TABLET BY MOUTH EVERY DAY    acetaminophen (TYLENOL) 325 mg tablet Take 2 Tabs by mouth every four (4) hours as needed for Pain or Fever (Over the counter medication).  alpha-D-galactosidase (BEANO PO) Take 1 Tab by mouth two (2) times a day.  cetirizine (ZYRTEC) 10 mg tablet Take 10 mg by mouth nightly.  opium tincture 10 mg/mL (morphine) liquid Take 1 mL by mouth every six (6) hours as needed for Diarrhea for up to 30 days. Max Daily Amount: 4 mL. (Patient not taking: Reported on 12/21/2021)    azithromycin (ZITHROMAX) 250 mg tablet  (Patient not taking: Reported on 12/21/2021)    diphenoxylate-atropine (LomotiL) 2.5-0.025 mg per tablet Take 1 Tablet by mouth four (4) times daily as needed for Diarrhea. Max Daily Amount: 4 Tablets. (Patient not taking: Reported on 12/21/2021)    pantoprazole (PROTONIX) 40 mg tablet TAKE 1 TABLET BY MOUTH EVERY DAY (Patient not taking: Reported on 12/21/2021)    naloxone [facility]) 4 mg/actuation nasal spray Use 1 spray intranasally, then discard. Repeat with new spray every 2 min as needed for opioid overdose symptoms, alternating nostrils. (Patient not taking: Reported on 12/21/2021)    loperamide (IMODIUM) 2 mg capsule Take 2-4 mg by mouth as needed for Diarrhea. Give 4 mg after first loose stool. Give an additional 2 mg after each loose stool as needed up to a maximum of 8 doses (16 mg/day). (Patient not taking: Reported on 12/21/2021)     No current facility-administered medications for this visit.           LAB DATA REVIEWED:     Lab Results   Component Value Date/Time    WBC 5.1 11/02/2021 11:06 AM    HGB 12.0 (L) 11/02/2021 11:06 AM    PLATELET 57 (L) 17/04/5816 11:06 AM     Lab Results   Component Value Date/Time    Sodium 136 11/18/2021 03:10 PM    Potassium 3.3 (L) 11/18/2021 03:10 PM    Chloride 105 11/18/2021 03:10 PM    CO2 25 11/18/2021 03:10 PM    BUN 10 11/18/2021 03:10 PM    Creatinine 0.83 11/18/2021 03:10 PM    Calcium 8.4 (L) 11/18/2021 03:10 PM    Magnesium 2.3 11/18/2021 03:10 PM    Phosphorus 2.2 (L) 11/18/2021 03:10 PM      Lab Results   Component Value Date/Time    Alk. phosphatase 176 (H) 11/18/2021 03:10 PM    Protein, total 6.0 (L) 11/18/2021 03:10 PM    Albumin 3.3 (L) 11/18/2021 03:10 PM    Globulin 2.7 11/18/2021 03:10 PM     Lab Results   Component Value Date/Time    INR 1.1 09/09/2020 02:15 AM    Prothrombin time 11.5 (H) 09/09/2020 02:15 AM    aPTT 29.9 09/09/2020 02:15 AM      Lab Results   Component Value Date/Time    Iron 31 (L) 01/02/2020 03:24 AM    TIBC 245 (L) 01/02/2020 03:24 AM    Iron % saturation 13 (L) 01/02/2020 03:24 AM    Ferritin 122 01/02/2020 03:24 AM     MRI- Dec 2021     IMPRESSION  1. Osseous metastases in the right hemisacrum and right posterosuperior  acetabulum. 2.  Thin-walled rim-enhancing fluid signal focus in the right inguinal region,  indeterminate, but differential considerations include necrotic metastatic lymph  node and fluid collection (e.g., seroma, abscess). Correlate clinically. 3.  Patchy edema signal and enhancement within and surrounding the left anterior  inferior iliac spine and rectus femoris origin, may reflect enthesopathic change  and/or age indeterminate rectus femoris avulsion injury. 4.  Bilateral hip joint osteoarthritis with (likely) degenerative superior  acetabular labral tears.         CT- Nov 2021  IMPRESSION  1. Progression of lung metastases. 2. Stable retroperitoneal tumor causing vascular occlusions/stenoses and left  UPJ obstruction. 3. New sclerotic bone focus/metastasis. Chest CT- aug 2021       IMPRESSION  1. Multiple pulmonary nodules some of which have increased in size which may  represent progression of disease.  Several new nodules are noted.     2. Nonspecific foci of enhancement in the right lobe the liver. These may  represent focal areas of inflammation or hyperemia given the  hepaticojejunostomy. Attention on follow-up imaging     3. Status post Whipple procedure.     4.  Stable periaortic soft tissue density.     5.  Stable perinephric stranding and fluid around the left kidney hilum       CT chest, abdomen and pelvis-May 2021  IMPRESSION     1. Unchanged appearance of the chest, abdomen, and pelvis when compared to  3/1/2021. No acute findings or evidence of disease progression. 2. Unchanged small pulmonary nodules. Unchanged retroperitoneal soft tissue mass  with left renal vein occlusion and collateral retroperitoneal venous structures.       CT abd 3/1/21  IMPRESSION  1. No significant change in the appearance of pulmonary nodules. 2. No significant change in the appearance of retroperitoneal adenopathy/mass. There is mass effect upon the vasculature, including occlusion of the left renal  vein with collateral vessels in the retroperitoneum, likely unchanged. 3. Incidental findings as above. CT chest 12/29/2020  IMPRESSION:     1. Numerous tiny pulmonary parenchymal nodules right greater than left,  suspicious for metastatic disease. 2. Focal ill-defined opacity in the right upper lobe which may represent  infiltrate. Follow-up recommended, however. 3. Incidental findings in the upper abdomen described earlier same day.      CONTROLLED SUBSTANCES SAFETY ASSESSMENT (IF ON CONTROLLED SUBSTANCES):     Reviewed opioid safety handout:  [x] Yes   [] No  24 hour opioid dose >150mg morphine equivalent/day:  [] Yes   [x] No  Benzodiazepines:  [] Yes   [x] No  Sleep apnea:  [] Yes   [x] No  Urine Toxicology Testing within last 6 months:  [] Yes   [x] No  History of or new aberrant medication taking behaviors:  [] Yes   [x] No  Has Narcan been prescribed [x] Yes   [] No          Total time: 45 minutes   Counseling / coordination time: 40  > 50% counseling / coordination?:   Consent:  He and/or health care decision maker is aware that that he may receive a bill for this telehealth service, depending on his insurance coverage, and has provided verbal consent to proceed: Yes    CPT Codes 68824-23012 for Established Patients may apply to this Telehealth Visit  Pursuant to the emergency declaration under the [address] [place] [address] waiver authority and the [employer] and [employer] Act, this Virtual  Visit was conducted, with patient's consent, to reduce the patient's risk of exposure to COVID-19 and provide continuity of care for an established patient. Services were provided through a video synchronous discussion virtually to substitute for in-person clinic visit.

## 2021-12-21 NOTE — PATIENT INSTRUCTIONS
Dear Natasha Guidry.,     It was a pleasure seeing you today by virtual video visit.     This is the plan we talked about:    -Disease discussion  -We reviewed your most recent CT and bone scan. There is progression of disease in the lung nodules. You are having more pain in your left lower back but this does not correlate with new bony mets in the left side. However, there is left hydronephrosis which is kidney swelling from the retroperitoneal tumor pushing on your renal vein. This could be the cause of your flank pain. Please call your urologist to make an appointment. .     -Chronic low back pain from new L5 met status post ablation and kyphoplasty, new and progressive right hip pain from sacral met  -This pain is very uncontrolled  -You are taking Dilaudid 8 mg every 3 hours day and night and still the pain is not controlled. We added OxyContin 20 mg 2 times a day but it did not make a difference. Given that you have such severe pain from the bone I would like to discontinue OxyContin and start you on methadone.  -Stop OxyContin  -Start methadone 10 mg 2 times a day for 7 days and then increase to 10 mg 3 times a day  -I have sent a prescription for Dilaudid 8 mg tablet every 3 hours as needed  -Apply lidocaine 5% patch locally which helps  -Referral to interventional radiology to see if osteocool is an option. You have an appointment today.     -Chemo-induced diarrhea  -This is resolved. You are no longer on tincture of opium.    -Sjogren's disease  -You are very careful to stay well hydrated. You drink water and sugar-free gatorade or powerade.     -Multivitamin deficiency  -You were found to have severe vitamin A, vitamin D, vitamin E deficiency. You are on replacement therapy now. You continue to follow with your PCP about this.  You had labs drawn in August and have had your vitamin supplementation adjusted due to this.    -Fatigue  -Sometimes you cannot do what you want to do because of the tiredness of your legs. You can get what you need to do done as long as you take breaks.    -Nausea  Your nausea has returned. I provided you with Compazine.    -Follow up  -W I will see you again in 4 weeks       reviewed and appropriate. Most recent CT scan reviewed and results discussed with patient. See results below in data portion of note.

## 2021-12-21 NOTE — TELEPHONE ENCOUNTER
Called Jhon Caal will see pt today at [facility] at 2pm for osteocool consult.

## 2021-12-22 NOTE — PROGRESS NOTES
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 Reported 5/10 on pain scale with pain med    Denies further issues of diarrhea at this time. BM today. Appetite - good      Plan of action  Provide support to patient. F/U post procedure     Goals       Attends follow-up appointments as directed. PCP - 11/10/21  Oncology - 11/24/21  Infusion - 11/18/21  Palliative - 12/30/21 11/19/21 call placed to pt, no answer. 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 Patient will adhere to pain management plan put in place by ED physician. Will discuss PT with PCP.   Supportive resources in place to maintain patient in the community (ie. Home Health, DME equipment, refer to, medication assistant plan, etc.)       [facility] - # [phone] 24/7  Nurse Access line 24/7  [facility]  [address] [place] [address] Urgent [facility] [phone]  HR Service Now - [facility] Workday  IT - [phone]  [facility] Help - 1- [phone]  Associate Services for advice and direction, by calling [phone] and pressing 1 or Absence. [email or web address]  [facility] - [phone] Go to SmartNews on the Internet or your mobile device and enter the password [email or web address] to access resources, educational information, and self-service options.             CM will f/u in 1 week

## 2021-12-23 NOTE — H&P
Radiology History and Physical    Patient: Spike Meier. 73 y.o. male       Chief Complaint: No chief complaint on file. History of Present Illness: Metastatic cholangiocarcinoma with right sacral bone lesion    History:    Past Medical History:   Diagnosis Date    Aneurysm ([facility] 2008    CEREBRAL    Arrhythmia     Previous a.fib, ablation, NSR now; NO LONGER FOLLOWED BY CARDIOLOGIST.     Autoimmune disease ([facility]     SJOGREN'S    [facility]    COMMON BILE DUCT, ADENOCARCINOMA, SPINE    Diabetes (HCC)     NIDDM    Family history of skin cancer     Hypertension     Sepsis ([facility] 2017    STENTING TO BILE DUCT    Status post chemotherapy     Stroke [facility]) 2008    brain aneurysm - no deficits    Sun-damaged skin     Sunburn, blistering      Family History   Problem Relation Age of Onset    Cancer Father         Breast and Colon, MELANOMA    Hypertension Father     Cancer Mother         LEUKEMIA    No Known Problems Sister     Atrial Fibrillation Brother     No Known Problems Sister     No Known Problems Son     No Known Problems Son     Anesth Problems Neg Hx      Social History     Socioeconomic History    Marital status:      Spouse name: Not on file    Number of children: Not on file    Years of education: Not on file    Highest education level: Not on file   Occupational History    Not on file   Tobacco Use    Smoking status: Never Smoker    Smokeless tobacco: Never Used   Vaping Use    Vaping Use: Never used   Substance and Sexual Activity    Alcohol use: Never     Comment: very rare    Drug use: No    Sexual activity: Yes     Partners: Female   Other Topics Concern    Not on file   Social History Narrative    Not on file     Social Determinants of Health     Financial Resource Strain:     Difficulty of Paying Living Expenses: Not on file   Food Insecurity:     Worried About Running Out of Food in the Last Year: Not on file    Hasmukh of Food in the Last Year: Not on file   Transportation Needs:     Lack of Transportation (Medical): Not on file    Lack of Transportation (Non-Medical): Not on file   Physical Activity:     Days of Exercise per Week: Not on file    Minutes of Exercise per Session: Not on file   Stress:     Feeling of Stress : Not on file   Social Connections:     Frequency of Communication with Friends and Family: Not on file    Frequency of Social Gatherings with Friends and Family: Not on file    Attends Zoroastrianism Services: Not on file    Active Member of [employer] or Organizations: Not on file    Attends Club or Organization Meetings: Not on file    Marital Status: Not on file   Intimate Partner Violence:     Fear of Current or Ex-Partner: Not on file    Emotionally Abused: Not on file    Physically Abused: Not on file    Sexually Abused: Not on file   Housing Stability:     Unable to Pay for Housing in the Last Year: Not on file    Number of [place] in the Last Year: Not on file    Unstable Housing in the Last Year: Not on file       Allergies: Allergies   Allergen Reactions    Shellfish Derived Anaphylaxis     Soft shell crabs    Morphine Nausea and Vomiting    Pcn [Penicillins] Other (comments)     Pt stated \"always been told PCN\"  Pt tolerated other cephalosporins in the past       Current Medications:  Current Outpatient Medications   Medication Sig    ferrous sulfate 325 mg (65 mg iron) tablet Take 325 mg by mouth Daily (before breakfast).  lidocaine (LIDODERM) 5 % 1 Patch by TransDERmal route every twenty-four (24) hours. Apply patch to the affected area for 12 hours a day and remove for 12 hours a day.  methadone (DOLOPHINE) 10 mg tablet Take 1 Tablet by mouth every eight (8) hours for 30 days. Max Daily Amount: 30 mg.    HYDROmorphone (DILAUDID) 8 mg tablet Take 1 Tablet by mouth every three (3) hours as needed for Pain for up to 15 days. Max Daily Amount: 64 mg.     dutasteride (AVODART) 0.5 mg capsule TAKE ONE CAPSULE BY MOUTH DAILY    tamsulosin (FLOMAX) 0.4 mg capsule TAKE 1 CAPSULE BY MOUTH ONCE EVERY EVENING    opium tincture 10 mg/mL (morphine) liquid Take 1 mL by mouth every six (6) hours as needed for Diarrhea for up to 30 days. Max Daily Amount: 4 mL. (Patient not taking: Reported on 12/21/2021)    HYDROmorphone (DILAUDID) 4 mg tablet Take 2 Tablets by mouth every three (3) hours as needed for Pain for up to 15 days. Max Daily Amount: 64 mg.    oxyCODONE ER (OxyCONTIN) 20 mg ER tablet Take 1 Tablet by mouth every twelve (12) hours for 30 days. Max Daily Amount: 40 mg.    dexAMETHasone (DECADRON) 4 mg tablet Take 4 mg by mouth daily (with breakfast).  azithromycin (ZITHROMAX) 250 mg tablet  (Patient not taking: Reported on 12/21/2021)    diphenoxylate-atropine (LomotiL) 2.5-0.025 mg per tablet Take 1 Tablet by mouth four (4) times daily as needed for Diarrhea. Max Daily Amount: 4 Tablets. (Patient not taking: Reported on 12/21/2021)    ondansetron (Zofran ODT) 4 mg disintegrating tablet Take 1 Tablet by mouth every eight (8) hours as needed for Nausea.  dicyclomine (BENTYL) 10 mg capsule Take 1 Capsule by mouth three (3) times daily as needed for Abdominal Cramps.  Creon 36,000-114,000- 180,000 unit cpDR capsule TAKE TWO TO THREE CAPSULES BY MOUTH WITH EACH MEAL AND ONE CAPSULE FOR SNACKS DAILY    colestipoL (COLESTID) 1 gram tablet     cyanocobalamin (VITAMIN B12) 1,000 mcg/mL injection 1 mL by IntraMUSCular route every seven (7) days.  calcium carbonate (CALCIUM 500 PO) Take  by mouth.  azelastine (ASTEPRO) 205.5 mcg (0.15 %) 2 Sprays by Both Nostrils route two (2) times a day.  vitamin A (AQUASOL A) 10,000 unit capsule TAKE 1 CAPSULE BY MOUTH ONE TIME A DAY    OLANZapine (ZyPREXA) 10 mg tablet Take 1 Tab by mouth See Admin Instructions. Take nightly for 4 days starting the evening of chemotherapy.     pantoprazole (PROTONIX) 40 mg tablet TAKE 1 TABLET BY MOUTH EVERY DAY (Patient not taking: Reported on 12/21/2021)    lidocaine-prilocaine (EMLA) topical cream Apply  to affected area as needed for Pain. 30-45 min prior to treatments    naloxone [facility]) 4 mg/actuation nasal spray Use 1 spray intranasally, then discard. Repeat with new spray every 2 min as needed for opioid overdose symptoms, alternating nostrils. (Patient not taking: Reported on 12/21/2021)    aluminum & magnesium hydroxide-simethicone (Maalox Maximum Strength) 400-400-40 mg/5 mL suspension Take 10 mL by mouth every six (6) hours as needed for Indigestion.  vitamin E (AQUA GEMS) 400 unit capsule Take 1 Cap by mouth daily.  empagliflozin (Jardiance) 25 mg tablet Take 1 Tab by mouth nightly.  gabapentin (NEURONTIN) 100 mg capsule Take 3 capsules by mouth twice a day.  ramipriL (ALTACE) 5 mg capsule Take 1 Cap by mouth daily. (Patient taking differently: Take 5 mg by mouth nightly.)    lidocaine (LIDODERM) 5 % 1 Patch by TransDERmal route every twenty-four (24) hours. Apply patch to the affected area for 12 hours a day and remove for 12 hours a day.  loperamide (IMODIUM) 2 mg capsule Take 2-4 mg by mouth as needed for Diarrhea. Give 4 mg after first loose stool. Give an additional 2 mg after each loose stool as needed up to a maximum of 8 doses (16 mg/day). (Patient not taking: Reported on 12/21/2021)    finasteride (PROSCAR) 5 mg tablet TAKE 1 TABLET BY MOUTH EVERY DAY    acetaminophen (TYLENOL) 325 mg tablet Take 2 Tabs by mouth every four (4) hours as needed for Pain or Fever (Over the counter medication).  alpha-D-galactosidase (BEANO PO) Take 1 Tab by mouth two (2) times a day.  cetirizine (ZYRTEC) 10 mg tablet Take 10 mg by mouth nightly.      Current Facility-Administered Medications   Medication Dose Route Frequency    fentaNYL citrate (PF) injection 200 mcg  200 mcg IntraVENous Multiple    midazolam (VERSED) injection 10 mg  10 mg IntraVENous Multiple    clindamycin (CLEOCIN) 900mg D5W 50mL IVPB (premix)  900 mg IntraVENous RAD ONCE    ketorolac (TORADOL) injection 15 mg  15 mg IntraVENous NOW    diphenhydrAMINE (BENADRYL) injection 25-50 mg  25-50 mg IntraVENous ONCE        Physical Exam:  There were no vitals taken for this visit. GENERAL: alert, cooperative, no distress, appears stated age  LUNG: clear to auscultation bilaterally  HEART: regular rate and rhythm  ABD: Non tender, non distended. Alerts:    Hospital Problems  Date Reviewed: 10/7/2021    None          Laboratory:    No results for input(s): HGB, HCT, WBC, PLT, INR, BUN, CREA, K, CRCLT, HGBEXT, HCTEXT, PLTEXT, INREXT in the last 72 hours. No lab exists for component: PTT, PT      Plan of Care/Planned Procedure:  Risks, benefits, and alternatives reviewed with patient and he agrees to proceed with the procedure. Deemed appropriate for moderate sedation with versed and fentanyl.       Yolanda Lennon MD

## 2021-12-23 NOTE — DISCHARGE INSTRUCTIONS
[address]  Special Procedures/Radiology Department      Radiologist:   Enage Harris    Date:  December 23, 2021    Kyphoplasty Discharge Instructions    Restrict your activity the next 24 hours. Resume your previous diet and follow medication reconciliation form. You may take Tylenol, as directed on the label, for pain or discomfort. Avoid ibuprofen (Advil, Motrin) and aspirin as they may cause bleeding, or continue your prescription pain medications as directed. Avoid lifting anything heavier than 5 pounds. Avoid excessive bending and lifting for one week    Be sure to follow up with your physician, and let him know how you are progressing. If you have severe pain, numbness, tingling, or weakness in your legs go to the nearest emergency department, and tell them you have had a kyphoplasty.

## 2022-01-01 ENCOUNTER — ANESTHESIA EVENT (OUTPATIENT)
Dept: SURGERY | Age: 67
End: 2022-01-01
Payer: MEDICARE

## 2022-01-01 ENCOUNTER — APPOINTMENT (OUTPATIENT)
Dept: INFUSION THERAPY | Age: 67
End: 2022-01-01

## 2022-01-01 ENCOUNTER — HOSPITAL ENCOUNTER (OUTPATIENT)
Dept: INFUSION THERAPY | Age: 67
Discharge: HOME | End: 2022-02-10
Payer: MEDICARE

## 2022-01-01 ENCOUNTER — OFFICE VISIT (OUTPATIENT)
Dept: INTERNAL MEDICINE CLINIC | Age: 67
End: 2022-01-01
Payer: MEDICARE

## 2022-01-01 ENCOUNTER — VIRTUAL VISIT (OUTPATIENT)
Dept: PALLATIVE CARE | Age: 67
End: 2022-01-01
Payer: MEDICARE

## 2022-01-01 ENCOUNTER — TELEPHONE (OUTPATIENT)
Dept: PALLATIVE CARE | Age: 67
End: 2022-01-01

## 2022-01-01 ENCOUNTER — HOSPITAL ENCOUNTER (OUTPATIENT)
Dept: INFUSION THERAPY | Age: 67
End: 2022-01-01

## 2022-01-01 ENCOUNTER — PATIENT OUTREACH (OUTPATIENT)
Dept: OTHER | Age: 67
End: 2022-01-01

## 2022-01-01 ENCOUNTER — OFFICE VISIT (OUTPATIENT)
Dept: ONCOLOGY | Age: 67
End: 2022-01-01
Payer: MEDICARE

## 2022-01-01 ENCOUNTER — APPOINTMENT (OUTPATIENT)
Dept: CT IMAGING | Age: 67
End: 2022-01-01
Attending: EMERGENCY MEDICINE
Payer: MEDICARE

## 2022-01-01 ENCOUNTER — TELEPHONE (OUTPATIENT)
Dept: ONCOLOGY | Age: 67
End: 2022-01-01

## 2022-01-01 ENCOUNTER — PATIENT MESSAGE (OUTPATIENT)
Dept: PALLATIVE CARE | Age: 67
End: 2022-01-01

## 2022-01-01 ENCOUNTER — DOCUMENTATION ONLY (OUTPATIENT)
Dept: ONCOLOGY | Age: 67
End: 2022-01-01

## 2022-01-01 ENCOUNTER — HOSPITAL ENCOUNTER (OUTPATIENT)
Dept: NUCLEAR MEDICINE | Age: 67
Discharge: HOME | End: 2022-02-21
Attending: INTERNAL MEDICINE
Payer: MEDICARE

## 2022-01-01 ENCOUNTER — TELEPHONE (OUTPATIENT)
Dept: INTERNAL MEDICINE CLINIC | Age: 67
End: 2022-01-01

## 2022-01-01 ENCOUNTER — TELEPHONE (OUTPATIENT)
Dept: SURGERY | Age: 67
End: 2022-01-01

## 2022-01-01 ENCOUNTER — HOSPITAL ENCOUNTER (EMERGENCY)
Age: 67
Discharge: HOME | End: 2022-02-18
Attending: EMERGENCY MEDICINE | Admitting: EMERGENCY MEDICINE
Payer: MEDICARE

## 2022-01-01 ENCOUNTER — APPOINTMENT (OUTPATIENT)
Dept: GENERAL RADIOLOGY | Age: 67
End: 2022-01-01
Attending: EMERGENCY MEDICINE
Payer: MEDICARE

## 2022-01-01 ENCOUNTER — APPOINTMENT (OUTPATIENT)
Dept: INFUSION THERAPY | Age: 67
End: 2022-01-01
Payer: MEDICARE

## 2022-01-01 ENCOUNTER — HOSPITAL ENCOUNTER (OUTPATIENT)
Dept: GENERAL RADIOLOGY | Age: 67
Discharge: HOME | End: 2022-04-01
Payer: MEDICARE

## 2022-01-01 ENCOUNTER — HOSPITAL ENCOUNTER (OUTPATIENT)
Dept: INFUSION THERAPY | Age: 67
Discharge: HOME | End: 2022-03-11
Payer: MEDICARE

## 2022-01-01 ENCOUNTER — HOSPITAL ENCOUNTER (OUTPATIENT)
Dept: PREADMISSION TESTING | Age: 67
Discharge: HOME | End: 2022-04-01
Payer: MEDICARE

## 2022-01-01 ENCOUNTER — PATIENT MESSAGE (OUTPATIENT)
Dept: INTERNAL MEDICINE CLINIC | Age: 67
End: 2022-01-01

## 2022-01-01 ENCOUNTER — DOCUMENTATION ONLY (OUTPATIENT)
Dept: SURGERY | Age: 67
End: 2022-01-01

## 2022-01-01 ENCOUNTER — OFFICE VISIT (OUTPATIENT)
Dept: SURGERY | Age: 67
End: 2022-01-01
Payer: MEDICARE

## 2022-01-01 ENCOUNTER — HOSPITAL ENCOUNTER (OUTPATIENT)
Dept: CT IMAGING | Age: 67
End: 2022-01-01
Attending: INTERNAL MEDICINE
Payer: MEDICARE

## 2022-01-01 ENCOUNTER — HOSPITAL ENCOUNTER (OUTPATIENT)
Dept: INFUSION THERAPY | Age: 67
Discharge: HOME | End: 2022-03-09
Payer: MEDICARE

## 2022-01-01 ENCOUNTER — HOSPITAL ENCOUNTER (OUTPATIENT)
Age: 67
Setting detail: OUTPATIENT SURGERY
Discharge: HOME | End: 2022-04-08
Attending: SURGERY | Admitting: SURGERY
Payer: MEDICARE

## 2022-01-01 ENCOUNTER — HOSPITAL ENCOUNTER (OUTPATIENT)
Dept: CT IMAGING | Age: 67
Discharge: HOME | End: 2022-02-21
Attending: INTERNAL MEDICINE
Payer: MEDICARE

## 2022-01-01 ENCOUNTER — OFFICE VISIT (OUTPATIENT)
Dept: INTERNAL MEDICINE CLINIC | Age: 67
End: 2022-01-01

## 2022-01-01 ENCOUNTER — VIRTUAL VISIT (OUTPATIENT)
Dept: INTERNAL MEDICINE CLINIC | Age: 67
End: 2022-01-01

## 2022-01-01 ENCOUNTER — ANESTHESIA (OUTPATIENT)
Dept: SURGERY | Age: 67
End: 2022-01-01
Payer: MEDICARE

## 2022-01-01 ENCOUNTER — HOSPITAL ENCOUNTER (OUTPATIENT)
Dept: INFUSION THERAPY | Age: 67
Discharge: HOME | End: 2022-03-23
Payer: MEDICARE

## 2022-01-01 VITALS
OXYGEN SATURATION: 96 % | WEIGHT: 148.1 LBS | SYSTOLIC BLOOD PRESSURE: 125 MMHG | RESPIRATION RATE: 18 BRPM | HEART RATE: 89 BPM | BODY MASS INDEX: 22.45 KG/M2 | TEMPERATURE: 97.1 F | HEIGHT: 68 IN | DIASTOLIC BLOOD PRESSURE: 73 MMHG

## 2022-01-01 VITALS
HEART RATE: 71 BPM | WEIGHT: 140.5 LBS | TEMPERATURE: 98.7 F | DIASTOLIC BLOOD PRESSURE: 60 MMHG | RESPIRATION RATE: 18 BRPM | HEIGHT: 68 IN | OXYGEN SATURATION: 96 % | BODY MASS INDEX: 21.29 KG/M2 | SYSTOLIC BLOOD PRESSURE: 98 MMHG

## 2022-01-01 VITALS
TEMPERATURE: 98.7 F | WEIGHT: 145.3 LBS | HEART RATE: 88 BPM | SYSTOLIC BLOOD PRESSURE: 111 MMHG | OXYGEN SATURATION: 97 % | RESPIRATION RATE: 16 BRPM | DIASTOLIC BLOOD PRESSURE: 66 MMHG | BODY MASS INDEX: 22.09 KG/M2

## 2022-01-01 VITALS
HEART RATE: 70 BPM | TEMPERATURE: 96.9 F | HEIGHT: 68 IN | WEIGHT: 140 LBS | DIASTOLIC BLOOD PRESSURE: 71 MMHG | SYSTOLIC BLOOD PRESSURE: 108 MMHG | BODY MASS INDEX: 21.22 KG/M2

## 2022-01-01 VITALS
DIASTOLIC BLOOD PRESSURE: 66 MMHG | RESPIRATION RATE: 16 BRPM | HEIGHT: 68 IN | WEIGHT: 145 LBS | OXYGEN SATURATION: 97 % | BODY MASS INDEX: 21.98 KG/M2 | SYSTOLIC BLOOD PRESSURE: 111 MMHG | HEART RATE: 88 BPM | TEMPERATURE: 98.7 F

## 2022-01-01 VITALS
WEIGHT: 136.91 LBS | SYSTOLIC BLOOD PRESSURE: 151 MMHG | DIASTOLIC BLOOD PRESSURE: 92 MMHG | HEART RATE: 72 BPM | OXYGEN SATURATION: 95 % | RESPIRATION RATE: 14 BRPM | HEIGHT: 68 IN | BODY MASS INDEX: 20.75 KG/M2 | TEMPERATURE: 97.7 F

## 2022-01-01 VITALS
HEIGHT: 68 IN | TEMPERATURE: 97.9 F | RESPIRATION RATE: 18 BRPM | SYSTOLIC BLOOD PRESSURE: 123 MMHG | WEIGHT: 136 LBS | OXYGEN SATURATION: 99 % | BODY MASS INDEX: 20.61 KG/M2 | DIASTOLIC BLOOD PRESSURE: 73 MMHG | HEART RATE: 72 BPM

## 2022-01-01 VITALS
HEIGHT: 68 IN | HEART RATE: 84 BPM | DIASTOLIC BLOOD PRESSURE: 65 MMHG | TEMPERATURE: 98 F | SYSTOLIC BLOOD PRESSURE: 104 MMHG | OXYGEN SATURATION: 97 % | BODY MASS INDEX: 20.68 KG/M2

## 2022-01-01 VITALS
OXYGEN SATURATION: 100 % | WEIGHT: 153.88 LBS | HEIGHT: 68 IN | HEART RATE: 69 BPM | SYSTOLIC BLOOD PRESSURE: 119 MMHG | DIASTOLIC BLOOD PRESSURE: 71 MMHG | BODY MASS INDEX: 23.32 KG/M2 | TEMPERATURE: 97.7 F | RESPIRATION RATE: 18 BRPM

## 2022-01-01 VITALS
DIASTOLIC BLOOD PRESSURE: 58 MMHG | RESPIRATION RATE: 16 BRPM | WEIGHT: 136.1 LBS | BODY MASS INDEX: 20.63 KG/M2 | OXYGEN SATURATION: 99 % | HEIGHT: 68 IN | SYSTOLIC BLOOD PRESSURE: 95 MMHG | TEMPERATURE: 97.9 F | HEART RATE: 81 BPM

## 2022-01-01 VITALS
RESPIRATION RATE: 16 BRPM | HEART RATE: 68 BPM | TEMPERATURE: 98.5 F | HEIGHT: 67 IN | BODY MASS INDEX: 23.07 KG/M2 | OXYGEN SATURATION: 96 % | WEIGHT: 147 LBS | SYSTOLIC BLOOD PRESSURE: 110 MMHG | DIASTOLIC BLOOD PRESSURE: 60 MMHG

## 2022-01-01 VITALS
HEIGHT: 68 IN | BODY MASS INDEX: 20.75 KG/M2 | DIASTOLIC BLOOD PRESSURE: 79 MMHG | SYSTOLIC BLOOD PRESSURE: 126 MMHG | WEIGHT: 136.91 LBS | TEMPERATURE: 97 F | RESPIRATION RATE: 20 BRPM | HEART RATE: 80 BPM

## 2022-01-01 VITALS
DIASTOLIC BLOOD PRESSURE: 68 MMHG | TEMPERATURE: 98.2 F | WEIGHT: 144 LBS | OXYGEN SATURATION: 98 % | SYSTOLIC BLOOD PRESSURE: 109 MMHG | BODY MASS INDEX: 21.82 KG/M2 | HEART RATE: 73 BPM | HEIGHT: 68 IN

## 2022-01-01 VITALS
HEIGHT: 68 IN | SYSTOLIC BLOOD PRESSURE: 125 MMHG | RESPIRATION RATE: 18 BRPM | WEIGHT: 148 LBS | DIASTOLIC BLOOD PRESSURE: 73 MMHG | TEMPERATURE: 97.1 F | BODY MASS INDEX: 22.43 KG/M2 | OXYGEN SATURATION: 96 % | HEART RATE: 89 BPM

## 2022-01-01 VITALS
HEART RATE: 76 BPM | RESPIRATION RATE: 18 BRPM | OXYGEN SATURATION: 97 % | SYSTOLIC BLOOD PRESSURE: 112 MMHG | DIASTOLIC BLOOD PRESSURE: 69 MMHG | TEMPERATURE: 98.1 F

## 2022-01-01 VITALS
HEIGHT: 68 IN | TEMPERATURE: 97.4 F | OXYGEN SATURATION: 97 % | RESPIRATION RATE: 20 BRPM | WEIGHT: 145.6 LBS | SYSTOLIC BLOOD PRESSURE: 99 MMHG | BODY MASS INDEX: 22.07 KG/M2 | HEART RATE: 72 BPM | DIASTOLIC BLOOD PRESSURE: 61 MMHG

## 2022-01-01 VITALS — DIASTOLIC BLOOD PRESSURE: 81 MMHG | SYSTOLIC BLOOD PRESSURE: 126 MMHG

## 2022-01-01 DIAGNOSIS — C24.9 CHOLANGIOCARCINOMA DETERMINED BY BIOPSY OF BILIARY TRACT (HCC): ICD-10-CM

## 2022-01-01 DIAGNOSIS — K59.1 FUNCTIONAL DIARRHEA: ICD-10-CM

## 2022-01-01 DIAGNOSIS — K40.91 RECURRENT RIGHT INGUINAL HERNIA: Primary | ICD-10-CM

## 2022-01-01 DIAGNOSIS — C22.1 CHOLANGIOCARCINOMA OF BILIARY TRACT (HCC): ICD-10-CM

## 2022-01-01 DIAGNOSIS — R06.02 SOB (SHORTNESS OF BREATH): Primary | ICD-10-CM

## 2022-01-01 DIAGNOSIS — D69.6 THROMBOCYTOPENIA (HCC): ICD-10-CM

## 2022-01-01 DIAGNOSIS — C22.1 CHOLANGIOCARCINOMA METASTATIC TO LUNG, UNSPECIFIED LATERALITY (HCC): Primary | ICD-10-CM

## 2022-01-01 DIAGNOSIS — F11.99 OPIOID USE, UNSPECIFIED WITH UNSPECIFIED OPIOID-INDUCED DISORDER (HCC): ICD-10-CM

## 2022-01-01 DIAGNOSIS — G89.3 PAIN FROM BONE METASTASES (HCC): ICD-10-CM

## 2022-01-01 DIAGNOSIS — R60.9 PERIPHERAL EDEMA: ICD-10-CM

## 2022-01-01 DIAGNOSIS — C22.1 CHOLANGIOCARCINOMA OF BILIARY TRACT (HCC): Primary | ICD-10-CM

## 2022-01-01 DIAGNOSIS — C78.00 CHOLANGIOCARCINOMA METASTATIC TO LUNG, UNSPECIFIED LATERALITY (HCC): Primary | ICD-10-CM

## 2022-01-01 DIAGNOSIS — K40.91 RECURRENT RIGHT INGUINAL HERNIA: ICD-10-CM

## 2022-01-01 DIAGNOSIS — E11.22 TYPE 2 DIABETES MELLITUS WITH CHRONIC KIDNEY DISEASE, WITHOUT LONG-TERM CURRENT USE OF INSULIN, UNSPECIFIED CKD STAGE (HCC): ICD-10-CM

## 2022-01-01 DIAGNOSIS — C79.51 METASTATIC CANCER TO BONE (HCC): ICD-10-CM

## 2022-01-01 DIAGNOSIS — M16.0 PRIMARY OSTEOARTHRITIS OF BOTH HIPS: ICD-10-CM

## 2022-01-01 DIAGNOSIS — E11.22 TYPE 2 DIABETES MELLITUS WITH CHRONIC KIDNEY DISEASE, WITHOUT LONG-TERM CURRENT USE OF INSULIN, UNSPECIFIED CKD STAGE (HCC): Primary | ICD-10-CM

## 2022-01-01 DIAGNOSIS — R63.4 WEIGHT LOSS: ICD-10-CM

## 2022-01-01 DIAGNOSIS — C79.51 METASTATIC CANCER TO BONE (HCC): Primary | ICD-10-CM

## 2022-01-01 DIAGNOSIS — G89.3 CANCER RELATED PAIN: ICD-10-CM

## 2022-01-01 DIAGNOSIS — E11.65 TYPE 2 DIABETES MELLITUS WITH HYPERGLYCEMIA, WITHOUT LONG-TERM CURRENT USE OF INSULIN (HCC): Primary | ICD-10-CM

## 2022-01-01 DIAGNOSIS — E11.9 TYPE 2 DIABETES MELLITUS WITHOUT COMPLICATION, WITHOUT LONG-TERM CURRENT USE OF INSULIN (HCC): Primary | ICD-10-CM

## 2022-01-01 DIAGNOSIS — E11.9 TYPE 2 DIABETES MELLITUS WITHOUT COMPLICATION, WITHOUT LONG-TERM CURRENT USE OF INSULIN (HCC): ICD-10-CM

## 2022-01-01 DIAGNOSIS — R63.4 WEIGHT LOSS: Primary | ICD-10-CM

## 2022-01-01 DIAGNOSIS — I48.0 PAROXYSMAL ATRIAL FIBRILLATION (HCC): ICD-10-CM

## 2022-01-01 DIAGNOSIS — C79.51 PAIN FROM BONE METASTASES (HCC): ICD-10-CM

## 2022-01-01 DIAGNOSIS — E56.9 VITAMIN DEFICIENCY: ICD-10-CM

## 2022-01-01 DIAGNOSIS — C78.00 CHOLANGIOCARCINOMA METASTATIC TO LUNG, UNSPECIFIED LATERALITY (HCC): ICD-10-CM

## 2022-01-01 DIAGNOSIS — E78.5 HYPERLIPIDEMIA ASSOCIATED WITH TYPE 2 DIABETES MELLITUS (HCC): ICD-10-CM

## 2022-01-01 DIAGNOSIS — R10.84 ABDOMINAL PAIN, GENERALIZED: Primary | ICD-10-CM

## 2022-01-01 DIAGNOSIS — R60.0 BILATERAL LEG EDEMA: ICD-10-CM

## 2022-01-01 DIAGNOSIS — R53.0 NEOPLASTIC MALIGNANT RELATED FATIGUE: ICD-10-CM

## 2022-01-01 DIAGNOSIS — R52 PAIN: ICD-10-CM

## 2022-01-01 DIAGNOSIS — N40.1 BENIGN PROSTATIC HYPERPLASIA WITH NOCTURIA: ICD-10-CM

## 2022-01-01 DIAGNOSIS — R68.89 FORGETFULNESS: Primary | ICD-10-CM

## 2022-01-01 DIAGNOSIS — D64.9 ANEMIA, UNSPECIFIED TYPE: ICD-10-CM

## 2022-01-01 DIAGNOSIS — R35.1 BENIGN PROSTATIC HYPERPLASIA WITH NOCTURIA: ICD-10-CM

## 2022-01-01 DIAGNOSIS — I82.3 RENAL VEIN THROMBOSIS (HCC): ICD-10-CM

## 2022-01-01 DIAGNOSIS — C22.1 CHOLANGIOCARCINOMA METASTATIC TO LUNG, UNSPECIFIED LATERALITY (HCC): ICD-10-CM

## 2022-01-01 DIAGNOSIS — R11.2 NAUSEA AND VOMITING, UNSPECIFIED VOMITING TYPE: ICD-10-CM

## 2022-01-01 DIAGNOSIS — J20.9 ACUTE BRONCHITIS, UNSPECIFIED ORGANISM: ICD-10-CM

## 2022-01-01 DIAGNOSIS — J90 BILATERAL PLEURAL EFFUSION: ICD-10-CM

## 2022-01-01 DIAGNOSIS — E11.69 HYPERLIPIDEMIA ASSOCIATED WITH TYPE 2 DIABETES MELLITUS (HCC): ICD-10-CM

## 2022-01-01 DIAGNOSIS — E43 SEVERE PROTEIN-CALORIE MALNUTRITION (GOMEZ: LESS THAN 60% OF STANDARD WEIGHT) (HCC): ICD-10-CM

## 2022-01-01 DIAGNOSIS — C78.00 MALIGNANT NEOPLASM METASTATIC TO LUNG, UNSPECIFIED LATERALITY (HCC): ICD-10-CM

## 2022-01-01 LAB
ALBUMIN SERPL-MCNC: 2.7 G/DL (ref 3.5–5)
ALBUMIN SERPL-MCNC: 2.9 G/DL (ref 3.5–5)
ALBUMIN SERPL-MCNC: 2.9 G/DL (ref 3.5–5)
ALBUMIN SERPL-MCNC: 3.2 G/DL (ref 3.5–5)
ALBUMIN/GLOB SERPL: 1 {RATIO} (ref 1.1–2.2)
ALBUMIN/GLOB SERPL: 1 {RATIO} (ref 1.1–2.2)
ALBUMIN/GLOB SERPL: 1.1 {RATIO} (ref 1.1–2.2)
ALBUMIN/GLOB SERPL: 1.3 {RATIO} (ref 1.1–2.2)
ALP SERPL-CCNC: 125 U/L (ref 45–117)
ALP SERPL-CCNC: 128 U/L (ref 45–117)
ALP SERPL-CCNC: 130 U/L (ref 45–117)
ALP SERPL-CCNC: 173 U/L (ref 45–117)
ALT SERPL-CCNC: 12 U/L (ref 12–78)
ALT SERPL-CCNC: 13 U/L (ref 12–78)
ALT SERPL-CCNC: 14 U/L (ref 12–78)
ALT SERPL-CCNC: 15 U/L (ref 12–78)
ANION GAP BLD CALC-SCNC: 8 MMOL/L (ref 10–20)
ANION GAP SERPL CALC-SCNC: 1 MMOL/L (ref 5–15)
ANION GAP SERPL CALC-SCNC: 4 MMOL/L (ref 5–15)
ANION GAP SERPL CALC-SCNC: 5 MMOL/L (ref 5–15)
ANION GAP SERPL CALC-SCNC: 7 MMOL/L (ref 5–15)
APTT PPP: 33.4 SEC (ref 22.1–31)
AST SERPL-CCNC: 5 U/L (ref 15–37)
AST SERPL-CCNC: 7 U/L (ref 15–37)
AST SERPL-CCNC: 7 U/L (ref 15–37)
AST SERPL-CCNC: 9 U/L (ref 15–37)
ATRIAL RATE: 63 BPM
BASOPHILS # BLD: 0 K/UL (ref 0–0.1)
BASOPHILS # BLD: 0.1 K/UL (ref 0–0.1)
BASOPHILS NFR BLD: 0 % (ref 0–1)
BASOPHILS NFR BLD: 1 % (ref 0–1)
BILIRUB DIRECT SERPL-MCNC: 0.3 MG/DL (ref 0–0.2)
BILIRUB SERPL-MCNC: 0.6 MG/DL (ref 0.2–1)
BILIRUB SERPL-MCNC: 0.8 MG/DL (ref 0.2–1)
BILIRUB SERPL-MCNC: 0.8 MG/DL (ref 0.2–1)
BILIRUB SERPL-MCNC: 0.9 MG/DL (ref 0.2–1)
BNP SERPL-MCNC: 1386 PG/ML
BUN SERPL-MCNC: 14 MG/DL (ref 6–20)
BUN SERPL-MCNC: 15 MG/DL (ref 6–20)
BUN SERPL-MCNC: 16 MG/DL (ref 6–20)
BUN SERPL-MCNC: 17 MG/DL (ref 6–20)
BUN/CREAT SERPL: 13 (ref 12–20)
BUN/CREAT SERPL: 13 (ref 12–20)
BUN/CREAT SERPL: 14 (ref 12–20)
BUN/CREAT SERPL: 15 (ref 12–20)
CA-I BLD-MCNC: 1.16 MMOL/L (ref 1.12–1.32)
CALCIUM SERPL-MCNC: 7.4 MG/DL (ref 8.5–10.1)
CALCIUM SERPL-MCNC: 7.8 MG/DL (ref 8.5–10.1)
CALCIUM SERPL-MCNC: 8.3 MG/DL (ref 8.5–10.1)
CALCIUM SERPL-MCNC: 8.8 MG/DL (ref 8.5–10.1)
CALCULATED P AXIS, ECG09: 96 DEGREES
CALCULATED R AXIS, ECG10: 5 DEGREES
CALCULATED T AXIS, ECG11: 35 DEGREES
CANCER AG19-9 SERPL-ACNC: 396 U/ML (ref 0–35)
CANCER AG19-9 SERPL-ACNC: 550 U/ML (ref 0–35)
CHLORIDE BLD-SCNC: 98 MMOL/L (ref 98–107)
CHLORIDE SERPL-SCNC: 101 MMOL/L (ref 97–108)
CHLORIDE SERPL-SCNC: 101 MMOL/L (ref 97–108)
CHLORIDE SERPL-SCNC: 103 MMOL/L (ref 97–108)
CHLORIDE SERPL-SCNC: 105 MMOL/L (ref 97–108)
CO2 BLD-SCNC: 26.6 MMOL/L (ref 21–32)
CO2 SERPL-SCNC: 25 MMOL/L (ref 21–32)
CO2 SERPL-SCNC: 26 MMOL/L (ref 21–32)
CO2 SERPL-SCNC: 29 MMOL/L (ref 21–32)
CO2 SERPL-SCNC: 30 MMOL/L (ref 21–32)
COMMENT, HOLDF: NORMAL
CREAT BLD-MCNC: 0.96 MG/DL (ref 0.6–1.3)
CREAT SERPL-MCNC: 1.05 MG/DL (ref 0.7–1.3)
CREAT SERPL-MCNC: 1.08 MG/DL (ref 0.7–1.3)
CREAT SERPL-MCNC: 1.14 MG/DL (ref 0.7–1.3)
CREAT SERPL-MCNC: 1.24 MG/DL (ref 0.7–1.3)
D DIMER PPP FEU-MCNC: 1.13 MG/L FEU (ref 0–0.65)
DIAGNOSIS, 93000: NORMAL
DIFFERENTIAL METHOD BLD: ABNORMAL
EOSINOPHIL # BLD: 0.1 K/UL (ref 0–0.4)
EOSINOPHIL # BLD: 0.2 K/UL (ref 0–0.4)
EOSINOPHIL NFR BLD: 3 % (ref 0–7)
EOSINOPHIL NFR BLD: 4 % (ref 0–7)
EOSINOPHIL NFR BLD: 4 % (ref 0–7)
EOSINOPHIL NFR BLD: 6 % (ref 0–7)
ERYTHROCYTE [DISTWIDTH] IN BLOOD BY AUTOMATED COUNT: 15.3 % (ref 11.5–14.5)
ERYTHROCYTE [DISTWIDTH] IN BLOOD BY AUTOMATED COUNT: 15.5 % (ref 11.5–14.5)
ERYTHROCYTE [DISTWIDTH] IN BLOOD BY AUTOMATED COUNT: 17.1 % (ref 11.5–14.5)
ERYTHROCYTE [DISTWIDTH] IN BLOOD BY AUTOMATED COUNT: 18.5 % (ref 11.5–14.5)
EST. AVERAGE GLUCOSE BLD GHB EST-MCNC: 212 MG/DL
GLOBULIN SER CALC-MCNC: 2.5 G/DL (ref 2–4)
GLOBULIN SER CALC-MCNC: 2.5 G/DL (ref 2–4)
GLOBULIN SER CALC-MCNC: 2.8 G/DL (ref 2–4)
GLOBULIN SER CALC-MCNC: 3 G/DL (ref 2–4)
GLUCOSE BLD STRIP.AUTO-MCNC: 170 MG/DL (ref 65–117)
GLUCOSE BLD STRIP.AUTO-MCNC: 193 MG/DL (ref 65–117)
GLUCOSE BLD-MCNC: 208 MG/DL (ref 65–100)
GLUCOSE POC: 313 MG/DL (ref 70–110)
GLUCOSE SERPL-MCNC: 228 MG/DL (ref 65–100)
GLUCOSE SERPL-MCNC: 241 MG/DL (ref 65–100)
GLUCOSE SERPL-MCNC: 245 MG/DL (ref 65–100)
GLUCOSE SERPL-MCNC: 288 MG/DL (ref 65–100)
HAV IGM SER QL: NONREACTIVE
HBA1C MFR BLD HPLC: 9.5 % (ref 4.8–5.6)
HBA1C MFR BLD: 9 % (ref 4–5.6)
HBV CORE IGM SER QL: NONREACTIVE
HBV SURFACE AG SER QL: <0.1 INDEX
HBV SURFACE AG SER QL: NEGATIVE
HCT VFR BLD AUTO: 28.9 % (ref 36.6–50.3)
HCT VFR BLD AUTO: 29.3 % (ref 36.6–50.3)
HCT VFR BLD AUTO: 29.3 % (ref 36.6–50.3)
HCT VFR BLD AUTO: 30.8 % (ref 36.6–50.3)
HCV AB SERPL QL IA: NONREACTIVE
HGB BLD-MCNC: 9 G/DL (ref 12.1–17)
HGB BLD-MCNC: 9.2 G/DL (ref 12.1–17)
HGB BLD-MCNC: 9.6 G/DL (ref 12.1–17)
HGB BLD-MCNC: 9.7 G/DL (ref 12.1–17)
IMM GRANULOCYTES # BLD AUTO: 0 K/UL (ref 0–0.04)
IMM GRANULOCYTES # BLD AUTO: 0.1 K/UL (ref 0–0.04)
IMM GRANULOCYTES NFR BLD AUTO: 0 % (ref 0–0.5)
IMM GRANULOCYTES NFR BLD AUTO: 1 % (ref 0–0.5)
INR PPP: 1.2 (ref 0.9–1.1)
LYMPHOCYTES # BLD: 0.2 K/UL (ref 0.8–3.5)
LYMPHOCYTES # BLD: 0.3 K/UL (ref 0.8–3.5)
LYMPHOCYTES # BLD: 0.4 K/UL (ref 0.8–3.5)
LYMPHOCYTES # BLD: 0.5 K/UL (ref 0.8–3.5)
LYMPHOCYTES NFR BLD: 10 % (ref 12–49)
LYMPHOCYTES NFR BLD: 13 % (ref 12–49)
LYMPHOCYTES NFR BLD: 7 % (ref 12–49)
LYMPHOCYTES NFR BLD: 9 % (ref 12–49)
MAGNESIUM SERPL-MCNC: 2.2 MG/DL (ref 1.6–2.4)
MAGNESIUM SERPL-MCNC: 2.4 MG/DL (ref 1.6–2.4)
MAGNESIUM SERPL-MCNC: 2.5 MG/DL (ref 1.6–2.4)
MCH RBC QN AUTO: 27.7 PG (ref 26–34)
MCH RBC QN AUTO: 28.2 PG (ref 26–34)
MCH RBC QN AUTO: 28.6 PG (ref 26–34)
MCH RBC QN AUTO: 29.1 PG (ref 26–34)
MCHC RBC AUTO-ENTMCNC: 31.1 G/DL (ref 30–36.5)
MCHC RBC AUTO-ENTMCNC: 31.4 G/DL (ref 30–36.5)
MCHC RBC AUTO-ENTMCNC: 31.5 G/DL (ref 30–36.5)
MCHC RBC AUTO-ENTMCNC: 32.8 G/DL (ref 30–36.5)
MCV RBC AUTO: 88.8 FL (ref 80–99)
MCV RBC AUTO: 88.9 FL (ref 80–99)
MCV RBC AUTO: 89.9 FL (ref 80–99)
MCV RBC AUTO: 90.9 FL (ref 80–99)
MONOCYTES # BLD: 0.3 K/UL (ref 0–1)
MONOCYTES # BLD: 0.4 K/UL (ref 0–1)
MONOCYTES # BLD: 0.5 K/UL (ref 0–1)
MONOCYTES # BLD: 0.7 K/UL (ref 0–1)
MONOCYTES NFR BLD: 10 % (ref 5–13)
MONOCYTES NFR BLD: 11 % (ref 5–13)
MONOCYTES NFR BLD: 13 % (ref 5–13)
MONOCYTES NFR BLD: 16 % (ref 5–13)
NEUTS SEG # BLD: 1.8 K/UL (ref 1.8–8)
NEUTS SEG # BLD: 2.4 K/UL (ref 1.8–8)
NEUTS SEG # BLD: 3 K/UL (ref 1.8–8)
NEUTS SEG # BLD: 4.8 K/UL (ref 1.8–8)
NEUTS SEG NFR BLD: 68 % (ref 32–75)
NEUTS SEG NFR BLD: 71 % (ref 32–75)
NEUTS SEG NFR BLD: 72 % (ref 32–75)
NEUTS SEG NFR BLD: 77 % (ref 32–75)
NRBC # BLD: 0 K/UL (ref 0–0.01)
NRBC BLD-RTO: 0 PER 100 WBC
P-R INTERVAL, ECG05: 156 MS
PHOSPHATE SERPL-MCNC: 2.4 MG/DL (ref 2.6–4.7)
PHOSPHATE SERPL-MCNC: 2.5 MG/DL (ref 2.6–4.7)
PLATELET # BLD AUTO: 44 K/UL (ref 150–400)
PLATELET # BLD AUTO: 80 K/UL (ref 150–400)
PLATELET # BLD AUTO: 88 K/UL (ref 150–400)
PLATELET # BLD AUTO: 99 K/UL (ref 150–400)
PLATELET COMMENTS,PCOM: ABNORMAL
PMV BLD AUTO: 10.7 FL (ref 8.9–12.9)
PMV BLD AUTO: 10.8 FL (ref 8.9–12.9)
PMV BLD AUTO: 11 FL (ref 8.9–12.9)
PMV BLD AUTO: 11.3 FL (ref 8.9–12.9)
POTASSIUM BLD-SCNC: 4 MMOL/L (ref 3.5–5.1)
POTASSIUM SERPL-SCNC: 2.8 MMOL/L (ref 3.5–5.1)
POTASSIUM SERPL-SCNC: 3.7 MMOL/L (ref 3.5–5.1)
POTASSIUM SERPL-SCNC: 4 MMOL/L (ref 3.5–5.1)
POTASSIUM SERPL-SCNC: 4.2 MMOL/L (ref 3.5–5.1)
PREALB SERPL-MCNC: 10.4 MG/DL (ref 20–40)
PROT SERPL-MCNC: 5.2 G/DL (ref 6.4–8.2)
PROT SERPL-MCNC: 5.7 G/DL (ref 6.4–8.2)
PROT SERPL-MCNC: 5.7 G/DL (ref 6.4–8.2)
PROT SERPL-MCNC: 5.9 G/DL (ref 6.4–8.2)
PROTHROMBIN TIME: 12.1 SEC (ref 9–11.1)
Q-T INTERVAL, ECG07: 444 MS
QRS DURATION, ECG06: 100 MS
QTC CALCULATION (BEZET), ECG08: 454 MS
RBC # BLD AUTO: 3.25 M/UL (ref 4.1–5.7)
RBC # BLD AUTO: 3.26 M/UL (ref 4.1–5.7)
RBC # BLD AUTO: 3.3 M/UL (ref 4.1–5.7)
RBC # BLD AUTO: 3.39 M/UL (ref 4.1–5.7)
RBC MORPH BLD: ABNORMAL
SAMPLES BEING HELD,HOLD: NORMAL
SERVICE CMNT-IMP: ABNORMAL
SODIUM BLD-SCNC: 132 MMOL/L (ref 136–145)
SODIUM SERPL-SCNC: 132 MMOL/L (ref 136–145)
SODIUM SERPL-SCNC: 134 MMOL/L (ref 136–145)
SODIUM SERPL-SCNC: 135 MMOL/L (ref 136–145)
SODIUM SERPL-SCNC: 136 MMOL/L (ref 136–145)
SP1: NORMAL
SP2: NORMAL
SP3: NORMAL
THERAPEUTIC RANGE,PTTT: ABNORMAL SECS (ref 58–77)
TROPONIN-HIGH SENSITIVITY: 10 NG/L (ref 0–76)
TROPONIN-HIGH SENSITIVITY: 9 NG/L (ref 0–76)
VENTRICULAR RATE, ECG03: 63 BPM
WBC # BLD AUTO: 2.5 K/UL (ref 4.1–11.1)
WBC # BLD AUTO: 3.2 K/UL (ref 4.1–11.1)
WBC # BLD AUTO: 4.2 K/UL (ref 4.1–11.1)
WBC # BLD AUTO: 6.3 K/UL (ref 4.1–11.1)

## 2022-01-01 PROCEDURE — G8752 SYS BP LESS 140: HCPCS | Performed by: INTERNAL MEDICINE

## 2022-01-01 PROCEDURE — 74011000250 HC RX REV CODE- 250: Performed by: INTERNAL MEDICINE

## 2022-01-01 PROCEDURE — 74011250637 HC RX REV CODE- 250/637: Performed by: ANESTHESIOLOGY

## 2022-01-01 PROCEDURE — 80076 HEPATIC FUNCTION PANEL: CPT

## 2022-01-01 PROCEDURE — 77030012965 HC NDL HUBR BBMI -A

## 2022-01-01 PROCEDURE — G8432 DEP SCR NOT DOC, RNG: HCPCS | Performed by: INTERNAL MEDICINE

## 2022-01-01 PROCEDURE — G8510 SCR DEP NEG, NO PLAN REQD: HCPCS | Performed by: INTERNAL MEDICINE

## 2022-01-01 PROCEDURE — 77030013079 HC BLNKT BAIR HGGR 3M -A: Performed by: ANESTHESIOLOGY

## 2022-01-01 PROCEDURE — 74011250636 HC RX REV CODE- 250/636: Performed by: INTERNAL MEDICINE

## 2022-01-01 PROCEDURE — 99213 OFFICE O/P EST LOW 20 MIN: CPT | Performed by: INTERNAL MEDICINE

## 2022-01-01 PROCEDURE — 1101F PT FALLS ASSESS-DOCD LE1/YR: CPT | Performed by: INTERNAL MEDICINE

## 2022-01-01 PROCEDURE — G8536 NO DOC ELDER MAL SCRN: HCPCS | Performed by: INTERNAL MEDICINE

## 2022-01-01 PROCEDURE — G8420 CALC BMI NORM PARAMETERS: HCPCS | Performed by: INTERNAL MEDICINE

## 2022-01-01 PROCEDURE — 77030020829: Performed by: SURGERY

## 2022-01-01 PROCEDURE — G8756 NO BP MEASURE DOC: HCPCS | Performed by: INTERNAL MEDICINE

## 2022-01-01 PROCEDURE — G8427 DOCREV CUR MEDS BY ELIG CLIN: HCPCS | Performed by: SURGERY

## 2022-01-01 PROCEDURE — 96375 TX/PRO/DX INJ NEW DRUG ADDON: CPT

## 2022-01-01 PROCEDURE — 99214 OFFICE O/P EST MOD 30 MIN: CPT | Performed by: INTERNAL MEDICINE

## 2022-01-01 PROCEDURE — 77030003578 HC NDL INSUF VERES AMR -B: Performed by: SURGERY

## 2022-01-01 PROCEDURE — 2022F DILAT RTA XM EVC RTNOPTHY: CPT | Performed by: INTERNAL MEDICINE

## 2022-01-01 PROCEDURE — 96417 CHEMO IV INFUS EACH ADDL SEQ: CPT

## 2022-01-01 PROCEDURE — G0463 HOSPITAL OUTPT CLINIC VISIT: HCPCS | Performed by: INTERNAL MEDICINE

## 2022-01-01 PROCEDURE — 96372 THER/PROPH/DIAG INJ SC/IM: CPT

## 2022-01-01 PROCEDURE — 77030040922 HC BLNKT HYPOTHRM STRY -A

## 2022-01-01 PROCEDURE — 96366 THER/PROPH/DIAG IV INF ADDON: CPT

## 2022-01-01 PROCEDURE — 77030026438 HC STYL ET INTUB CARD -A: Performed by: ANESTHESIOLOGY

## 2022-01-01 PROCEDURE — 71260 CT THORAX DX C+: CPT

## 2022-01-01 PROCEDURE — 74011250636 HC RX REV CODE- 250/636: Performed by: NURSE PRACTITIONER

## 2022-01-01 PROCEDURE — 74011250636 HC RX REV CODE- 250/636: Performed by: NURSE ANESTHETIST, CERTIFIED REGISTERED

## 2022-01-01 PROCEDURE — 77030008684 HC TU ET CUF COVD -B: Performed by: ANESTHESIOLOGY

## 2022-01-01 PROCEDURE — 3046F HEMOGLOBIN A1C LEVEL >9.0%: CPT | Performed by: INTERNAL MEDICINE

## 2022-01-01 PROCEDURE — 3017F COLORECTAL CA SCREEN DOC REV: CPT | Performed by: INTERNAL MEDICINE

## 2022-01-01 PROCEDURE — 36415 COLL VENOUS BLD VENIPUNCTURE: CPT

## 2022-01-01 PROCEDURE — 85379 FIBRIN DEGRADATION QUANT: CPT

## 2022-01-01 PROCEDURE — G8427 DOCREV CUR MEDS BY ELIG CLIN: HCPCS | Performed by: INTERNAL MEDICINE

## 2022-01-01 PROCEDURE — 84484 ASSAY OF TROPONIN QUANT: CPT

## 2022-01-01 PROCEDURE — G8432 DEP SCR NOT DOC, RNG: HCPCS | Performed by: SURGERY

## 2022-01-01 PROCEDURE — C1781 MESH (IMPLANTABLE): HCPCS | Performed by: SURGERY

## 2022-01-01 PROCEDURE — 84100 ASSAY OF PHOSPHORUS: CPT

## 2022-01-01 PROCEDURE — 77030010507 HC ADH SKN DERMBND J&J -B: Performed by: SURGERY

## 2022-01-01 PROCEDURE — 74011000636 HC RX REV CODE- 636: Performed by: INTERNAL MEDICINE

## 2022-01-01 PROCEDURE — 82962 GLUCOSE BLOOD TEST: CPT | Performed by: INTERNAL MEDICINE

## 2022-01-01 PROCEDURE — 71046 X-RAY EXAM CHEST 2 VIEWS: CPT

## 2022-01-01 PROCEDURE — G8754 DIAS BP LESS 90: HCPCS | Performed by: SURGERY

## 2022-01-01 PROCEDURE — G8754 DIAS BP LESS 90: HCPCS | Performed by: INTERNAL MEDICINE

## 2022-01-01 PROCEDURE — 83036 HEMOGLOBIN GLYCOSYLATED A1C: CPT | Performed by: INTERNAL MEDICINE

## 2022-01-01 PROCEDURE — 74011000636 HC RX REV CODE- 636: Performed by: EMERGENCY MEDICINE

## 2022-01-01 PROCEDURE — 80053 COMPREHEN METABOLIC PANEL: CPT

## 2022-01-01 PROCEDURE — 96415 CHEMO IV INFUSION ADDL HR: CPT

## 2022-01-01 PROCEDURE — 76210000000 HC OR PH I REC 2 TO 2.5 HR: Performed by: SURGERY

## 2022-01-01 PROCEDURE — 76060000034 HC ANESTHESIA 1.5 TO 2 HR: Performed by: SURGERY

## 2022-01-01 PROCEDURE — 77030040361 HC SLV COMPR DVT MDII -B: Performed by: SURGERY

## 2022-01-01 PROCEDURE — 96361 HYDRATE IV INFUSION ADD-ON: CPT

## 2022-01-01 PROCEDURE — 74011250636 HC RX REV CODE- 250/636: Performed by: ANESTHESIOLOGY

## 2022-01-01 PROCEDURE — 77030025927 HC STPLR FIX SECURSTRP J&J -F: Performed by: SURGERY

## 2022-01-01 PROCEDURE — 93005 ELECTROCARDIOGRAM TRACING: CPT

## 2022-01-01 PROCEDURE — 96416 CHEMO PROLONG INFUSE W/PUMP: CPT

## 2022-01-01 PROCEDURE — 71275 CT ANGIOGRAPHY CHEST: CPT

## 2022-01-01 PROCEDURE — 86301 IMMUNOASSAY TUMOR CA 19-9: CPT

## 2022-01-01 PROCEDURE — 77030011811 HC STPLR ENDOSC COVD -C: Performed by: SURGERY

## 2022-01-01 PROCEDURE — 74177 CT ABD & PELVIS W/CONTRAST: CPT

## 2022-01-01 PROCEDURE — 74011000258 HC RX REV CODE- 258: Performed by: INTERNAL MEDICINE

## 2022-01-01 PROCEDURE — 96365 THER/PROPH/DIAG IV INF INIT: CPT

## 2022-01-01 PROCEDURE — 77030002933 HC SUT MCRYL J&J -A: Performed by: SURGERY

## 2022-01-01 PROCEDURE — 74011000250 HC RX REV CODE- 250: Performed by: SURGERY

## 2022-01-01 PROCEDURE — 77030009964 HC RELD STPLR ENDOS COVD -B: Performed by: SURGERY

## 2022-01-01 PROCEDURE — 77030012770 HC TRCR OPT FX AMR -B: Performed by: SURGERY

## 2022-01-01 PROCEDURE — 77030031139 HC SUT VCRL2 J&J -A: Performed by: SURGERY

## 2022-01-01 PROCEDURE — G8420 CALC BMI NORM PARAMETERS: HCPCS | Performed by: SURGERY

## 2022-01-01 PROCEDURE — 85610 PROTHROMBIN TIME: CPT

## 2022-01-01 PROCEDURE — 82962 GLUCOSE BLOOD TEST: CPT

## 2022-01-01 PROCEDURE — 74011000250 HC RX REV CODE- 250: Performed by: NURSE PRACTITIONER

## 2022-01-01 PROCEDURE — 77030017006 HC DISECTR CRV1 J&J -B: Performed by: SURGERY

## 2022-01-01 PROCEDURE — 83735 ASSAY OF MAGNESIUM: CPT

## 2022-01-01 PROCEDURE — G0463 HOSPITAL OUTPT CLINIC VISIT: HCPCS | Performed by: NURSE PRACTITIONER

## 2022-01-01 PROCEDURE — 99215 OFFICE O/P EST HI 40 MIN: CPT | Performed by: INTERNAL MEDICINE

## 2022-01-01 PROCEDURE — 85025 COMPLETE CBC W/AUTO DIFF WBC: CPT

## 2022-01-01 PROCEDURE — 99282 EMERGENCY DEPT VISIT SF MDM: CPT

## 2022-01-01 PROCEDURE — 85730 THROMBOPLASTIN TIME PARTIAL: CPT

## 2022-01-01 PROCEDURE — 99214 OFFICE O/P EST MOD 30 MIN: CPT | Performed by: SURGERY

## 2022-01-01 PROCEDURE — 96368 THER/DIAG CONCURRENT INF: CPT

## 2022-01-01 PROCEDURE — 1101F PT FALLS ASSESS-DOCD LE1/YR: CPT | Performed by: SURGERY

## 2022-01-01 PROCEDURE — 3017F COLORECTAL CA SCREEN DOC REV: CPT | Performed by: SURGERY

## 2022-01-01 PROCEDURE — 96360 HYDRATION IV INFUSION INIT: CPT

## 2022-01-01 PROCEDURE — 76010000153 HC OR TIME 1.5 TO 2 HR: Performed by: SURGERY

## 2022-01-01 PROCEDURE — 74011000250 HC RX REV CODE- 250: Performed by: NURSE ANESTHETIST, CERTIFIED REGISTERED

## 2022-01-01 PROCEDURE — 74011250636 HC RX REV CODE- 250/636: Performed by: SURGERY

## 2022-01-01 PROCEDURE — 96374 THER/PROPH/DIAG INJ IV PUSH: CPT

## 2022-01-01 PROCEDURE — G8536 NO DOC ELDER MAL SCRN: HCPCS | Performed by: SURGERY

## 2022-01-01 PROCEDURE — G8752 SYS BP LESS 140: HCPCS | Performed by: SURGERY

## 2022-01-01 PROCEDURE — 83036 HEMOGLOBIN GLYCOSYLATED A1C: CPT

## 2022-01-01 PROCEDURE — 76210000020 HC REC RM PH II FIRST 0.5 HR: Performed by: SURGERY

## 2022-01-01 PROCEDURE — 49651 LAP ING HERNIA REPAIR RECUR: CPT | Performed by: PHYSICIAN ASSISTANT

## 2022-01-01 PROCEDURE — 77030039895 HC SYST SMK EVAC LAP COVD -B: Performed by: SURGERY

## 2022-01-01 PROCEDURE — 2709999900 HC NON-CHARGEABLE SUPPLY: Performed by: SURGERY

## 2022-01-01 PROCEDURE — 96413 CHEMO IV INFUSION 1 HR: CPT

## 2022-01-01 PROCEDURE — 78306 BONE IMAGING WHOLE BODY: CPT

## 2022-01-01 PROCEDURE — 77030008608 HC TRCR ENDOSC SMTH AMR -B: Performed by: SURGERY

## 2022-01-01 PROCEDURE — 80074 ACUTE HEPATITIS PANEL: CPT

## 2022-01-01 PROCEDURE — 49651 LAP ING HERNIA REPAIR RECUR: CPT | Performed by: SURGERY

## 2022-01-01 PROCEDURE — 83880 ASSAY OF NATRIURETIC PEPTIDE: CPT

## 2022-01-01 PROCEDURE — 80047 BASIC METABLC PNL IONIZED CA: CPT

## 2022-01-01 DEVICE — LAPAROSCOPIC SELF-FIXATING MESH, RIGHT ANATOMICAL
Type: IMPLANTABLE DEVICE | Site: ABDOMEN | Status: FUNCTIONAL
Brand: PROGRIP

## 2022-01-01 RX ORDER — DEXTROSE, SODIUM CHLORIDE, SODIUM LACTATE, POTASSIUM CHLORIDE, AND CALCIUM CHLORIDE 5; .6; .31; .03; .02 G/100ML; G/100ML; G/100ML; G/100ML; G/100ML
125 INJECTION, SOLUTION INTRAVENOUS CONTINUOUS
Status: DISCONTINUED | OUTPATIENT
Start: 2022-01-01 | End: 2022-01-01 | Stop reason: HOSPADM

## 2022-01-01 RX ORDER — FENTANYL CITRATE 50 UG/ML
50 INJECTION, SOLUTION INTRAMUSCULAR; INTRAVENOUS AS NEEDED
Status: DISCONTINUED | OUTPATIENT
Start: 2022-01-01 | End: 2022-01-01 | Stop reason: HOSPADM

## 2022-01-01 RX ORDER — HYDROMORPHONE HYDROCHLORIDE 8 MG/1
8 TABLET ORAL
Qty: 120 TABLET | Refills: 0 | Status: SHIPPED | OUTPATIENT
Start: 2022-01-01 | End: 2022-01-01

## 2022-01-01 RX ORDER — FLUOROURACIL 50 MG/ML
400 INJECTION, SOLUTION INTRAVENOUS ONCE
Status: COMPLETED | OUTPATIENT
Start: 2022-01-01 | End: 2022-01-01

## 2022-01-01 RX ORDER — ALBUTEROL SULFATE 0.83 MG/ML
2.5 SOLUTION RESPIRATORY (INHALATION) AS NEEDED
Status: CANCELLED
Start: 2022-01-01

## 2022-01-01 RX ORDER — SODIUM CHLORIDE 0.9 % (FLUSH) 0.9 %
5-40 SYRINGE (ML) INJECTION EVERY 8 HOURS
Status: DISCONTINUED | OUTPATIENT
Start: 2022-01-01 | End: 2022-01-01 | Stop reason: HOSPADM

## 2022-01-01 RX ORDER — OXYCODONE HYDROCHLORIDE 10 MG/1
20 TABLET ORAL
Qty: 180 TABLET | Refills: 0 | Status: SHIPPED | OUTPATIENT
Start: 2022-01-01 | End: 2022-01-01

## 2022-01-01 RX ORDER — FENTANYL CITRATE 50 UG/ML
25 INJECTION, SOLUTION INTRAMUSCULAR; INTRAVENOUS
Status: COMPLETED | OUTPATIENT
Start: 2022-01-01 | End: 2022-01-01

## 2022-01-01 RX ORDER — HEPARIN 100 UNIT/ML
300-500 SYRINGE INTRAVENOUS AS NEEDED
Status: ACTIVE | OUTPATIENT
Start: 2022-01-01 | End: 2022-01-01

## 2022-01-01 RX ORDER — ACETAMINOPHEN 325 MG/1
650 TABLET ORAL AS NEEDED
Status: CANCELLED
Start: 2022-01-01

## 2022-01-01 RX ORDER — LIDOCAINE 50 MG/G
1 PATCH TOPICAL EVERY 24 HOURS
Qty: 1 EACH | Refills: 5 | Status: SHIPPED | OUTPATIENT
Start: 2022-01-01 | End: 2022-01-01 | Stop reason: SDUPTHER

## 2022-01-01 RX ORDER — DEXAMETHASONE SODIUM PHOSPHATE 4 MG/ML
INJECTION, SOLUTION INTRA-ARTICULAR; INTRALESIONAL; INTRAMUSCULAR; INTRAVENOUS; SOFT TISSUE AS NEEDED
Status: DISCONTINUED | OUTPATIENT
Start: 2022-01-01 | End: 2022-01-01 | Stop reason: HOSPADM

## 2022-01-01 RX ORDER — EPINEPHRINE 1 MG/ML
0.3 INJECTION, SOLUTION, CONCENTRATE INTRAVENOUS AS NEEDED
Status: CANCELLED | OUTPATIENT
Start: 2022-01-01

## 2022-01-01 RX ORDER — DIPHENHYDRAMINE HYDROCHLORIDE 50 MG/ML
25 INJECTION, SOLUTION INTRAMUSCULAR; INTRAVENOUS AS NEEDED
Status: CANCELLED
Start: 2022-01-01

## 2022-01-01 RX ORDER — DEXTROSE MONOHYDRATE 50 MG/ML
25 INJECTION, SOLUTION INTRAVENOUS CONTINUOUS
Status: CANCELLED | OUTPATIENT
Start: 2022-01-01 | End: 2022-01-01

## 2022-01-01 RX ORDER — FENTANYL CITRATE 50 UG/ML
INJECTION, SOLUTION INTRAMUSCULAR; INTRAVENOUS AS NEEDED
Status: DISCONTINUED | OUTPATIENT
Start: 2022-01-01 | End: 2022-01-01 | Stop reason: HOSPADM

## 2022-01-01 RX ORDER — METFORMIN HYDROCHLORIDE 500 MG/1
500 TABLET ORAL 2 TIMES DAILY WITH MEALS
COMMUNITY
End: 2022-01-01

## 2022-01-01 RX ORDER — ACETAMINOPHEN 325 MG/1
650 TABLET ORAL ONCE
Status: DISCONTINUED | OUTPATIENT
Start: 2022-01-01 | End: 2022-01-01 | Stop reason: HOSPADM

## 2022-01-01 RX ORDER — HEPARIN 100 UNIT/ML
500 SYRINGE INTRAVENOUS AS NEEDED
Status: DISCONTINUED | OUTPATIENT
Start: 2022-01-01 | End: 2022-01-01 | Stop reason: HOSPADM

## 2022-01-01 RX ORDER — METHADONE HYDROCHLORIDE 10 MG/1
10 TABLET ORAL EVERY 8 HOURS
Qty: 90 TABLET | Refills: 0 | Status: SHIPPED | OUTPATIENT
Start: 2022-01-01 | End: 2022-01-01 | Stop reason: SDUPTHER

## 2022-01-01 RX ORDER — METHADONE HYDROCHLORIDE 10 MG/1
TABLET ORAL
Qty: 210 TABLET | Refills: 0 | Status: SHIPPED | OUTPATIENT
Start: 2022-01-01 | End: 2022-01-01

## 2022-01-01 RX ORDER — DIPHENHYDRAMINE HCL 25 MG
1 TABLET ORAL
COMMUNITY
End: 2022-01-01 | Stop reason: ALTCHOICE

## 2022-01-01 RX ORDER — LIDOCAINE HYDROCHLORIDE 10 MG/ML
0.5 INJECTION, SOLUTION EPIDURAL; INFILTRATION; INTRACAUDAL; PERINEURAL AS NEEDED
Status: DISCONTINUED | OUTPATIENT
Start: 2022-01-01 | End: 2022-01-01 | Stop reason: HOSPADM

## 2022-01-01 RX ORDER — HEPARIN 100 UNIT/ML
300-500 SYRINGE INTRAVENOUS AS NEEDED
Status: CANCELLED
Start: 2022-01-01

## 2022-01-01 RX ORDER — DIPHENHYDRAMINE HYDROCHLORIDE 50 MG/ML
50 INJECTION, SOLUTION INTRAMUSCULAR; INTRAVENOUS AS NEEDED
Status: CANCELLED
Start: 2022-01-01

## 2022-01-01 RX ORDER — HYDROCORTISONE SODIUM SUCCINATE 100 MG/2ML
100 INJECTION, POWDER, FOR SOLUTION INTRAMUSCULAR; INTRAVENOUS AS NEEDED
Status: CANCELLED | OUTPATIENT
Start: 2022-01-01

## 2022-01-01 RX ORDER — DIPHENHYDRAMINE HYDROCHLORIDE 50 MG/ML
12.5 INJECTION, SOLUTION INTRAMUSCULAR; INTRAVENOUS AS NEEDED
Status: DISCONTINUED | OUTPATIENT
Start: 2022-01-01 | End: 2022-01-01 | Stop reason: HOSPADM

## 2022-01-01 RX ORDER — DICLOFENAC SODIUM 10 MG/G
GEL TOPICAL 4 TIMES DAILY
Qty: 1 EACH | Refills: 2 | Status: SHIPPED | OUTPATIENT
Start: 2022-01-01

## 2022-01-01 RX ORDER — SODIUM CHLORIDE 9 MG/ML
10 INJECTION INTRAMUSCULAR; INTRAVENOUS; SUBCUTANEOUS AS NEEDED
Status: DISCONTINUED | OUTPATIENT
Start: 2022-01-01 | End: 2022-01-01 | Stop reason: HOSPADM

## 2022-01-01 RX ORDER — POTASSIUM CHLORIDE 7.45 MG/ML
10 INJECTION INTRAVENOUS
Status: COMPLETED | OUTPATIENT
Start: 2022-01-01 | End: 2022-01-01

## 2022-01-01 RX ORDER — FLUOROURACIL 50 MG/ML
400 INJECTION, SOLUTION INTRAVENOUS ONCE
Status: CANCELLED
Start: 2022-01-01 | End: 2022-01-01

## 2022-01-01 RX ORDER — SODIUM CHLORIDE, SODIUM LACTATE, POTASSIUM CHLORIDE, CALCIUM CHLORIDE 600; 310; 30; 20 MG/100ML; MG/100ML; MG/100ML; MG/100ML
125 INJECTION, SOLUTION INTRAVENOUS CONTINUOUS
Status: DISCONTINUED | OUTPATIENT
Start: 2022-01-01 | End: 2022-01-01 | Stop reason: HOSPADM

## 2022-01-01 RX ORDER — METHADONE HYDROCHLORIDE 10 MG/1
TABLET ORAL
Qty: 210 TABLET | Refills: 0 | Status: SHIPPED | OUTPATIENT
Start: 2022-01-01 | End: 2022-01-01 | Stop reason: SDUPTHER

## 2022-01-01 RX ORDER — SODIUM CHLORIDE 0.9 % (FLUSH) 0.9 %
5-40 SYRINGE (ML) INJECTION AS NEEDED
Status: DISCONTINUED | OUTPATIENT
Start: 2022-01-01 | End: 2022-01-01 | Stop reason: HOSPADM

## 2022-01-01 RX ORDER — GLIPIZIDE 5 MG/1
5 TABLET ORAL
Qty: 30 TABLET | Refills: 1 | Status: SHIPPED | OUTPATIENT
Start: 2022-01-01 | End: 2022-01-01

## 2022-01-01 RX ORDER — HEPARIN 100 UNIT/ML
300-500 SYRINGE INTRAVENOUS AS NEEDED
Status: DISCONTINUED | OUTPATIENT
Start: 2022-01-01 | End: 2022-01-01 | Stop reason: HOSPADM

## 2022-01-01 RX ORDER — LIDOCAINE 50 MG/G
1 PATCH TOPICAL EVERY 24 HOURS
Qty: 30 EACH | Refills: 1 | Status: SHIPPED | OUTPATIENT
Start: 2022-01-01

## 2022-01-01 RX ORDER — HYDROMORPHONE HYDROCHLORIDE 8 MG/1
8 TABLET ORAL
Qty: 90 TABLET | Refills: 0 | Status: SHIPPED | OUTPATIENT
Start: 2022-01-01 | End: 2022-01-01 | Stop reason: SDUPTHER

## 2022-01-01 RX ORDER — ONDANSETRON 2 MG/ML
8 INJECTION INTRAMUSCULAR; INTRAVENOUS AS NEEDED
Status: CANCELLED | OUTPATIENT
Start: 2022-01-01

## 2022-01-01 RX ORDER — CYANOCOBALAMIN 1000 UG/ML
1000 INJECTION, SOLUTION INTRAMUSCULAR; SUBCUTANEOUS EVERY 2 WEEKS
Qty: 1 ML | Refills: 4
Start: 2022-01-01

## 2022-01-01 RX ORDER — POTASSIUM CHLORIDE 1.5 G/1.77G
20 POWDER, FOR SOLUTION ORAL DAILY
Qty: 2 PACKET | Refills: 0 | Status: SHIPPED | OUTPATIENT
Start: 2022-01-01 | End: 2022-01-01 | Stop reason: ALTCHOICE

## 2022-01-01 RX ORDER — PHENYLEPHRINE HCL IN 0.9% NACL 0.4MG/10ML
SYRINGE (ML) INTRAVENOUS AS NEEDED
Status: DISCONTINUED | OUTPATIENT
Start: 2022-01-01 | End: 2022-01-01 | Stop reason: HOSPADM

## 2022-01-01 RX ORDER — METFORMIN HYDROCHLORIDE 500 MG/1
500 TABLET ORAL DAILY
Qty: 90 TABLET | Refills: 3
Start: 2022-01-01 | End: 2022-01-01 | Stop reason: ALTCHOICE

## 2022-01-01 RX ORDER — KETAMINE HCL IN 0.9 % NACL 50 MG/5 ML
SYRINGE (ML) INTRAVENOUS AS NEEDED
Status: DISCONTINUED | OUTPATIENT
Start: 2022-01-01 | End: 2022-01-01 | Stop reason: HOSPADM

## 2022-01-01 RX ORDER — VANCOMYCIN/0.9 % SOD CHLORIDE 1 G/100 ML
1000 PLASTIC BAG, INJECTION (ML) INTRAVENOUS ONCE
Status: COMPLETED | OUTPATIENT
Start: 2022-01-01 | End: 2022-01-01

## 2022-01-01 RX ORDER — SODIUM CHLORIDE 0.9 % (FLUSH) 0.9 %
10 SYRINGE (ML) INJECTION AS NEEDED
Status: DISCONTINUED | OUTPATIENT
Start: 2022-01-01 | End: 2022-01-01 | Stop reason: HOSPADM

## 2022-01-01 RX ORDER — PALONOSETRON 0.05 MG/ML
0.25 INJECTION, SOLUTION INTRAVENOUS ONCE
Status: CANCELLED | OUTPATIENT
Start: 2022-01-01 | End: 2022-01-01

## 2022-01-01 RX ORDER — MIDAZOLAM HYDROCHLORIDE 1 MG/ML
1 INJECTION, SOLUTION INTRAMUSCULAR; INTRAVENOUS AS NEEDED
Status: DISCONTINUED | OUTPATIENT
Start: 2022-01-01 | End: 2022-01-01 | Stop reason: HOSPADM

## 2022-01-01 RX ORDER — FUROSEMIDE 40 MG/1
40 TABLET ORAL
Qty: 90 TABLET | Refills: 1
Start: 2022-01-01

## 2022-01-01 RX ORDER — ROCURONIUM BROMIDE 10 MG/ML
INJECTION, SOLUTION INTRAVENOUS AS NEEDED
Status: DISCONTINUED | OUTPATIENT
Start: 2022-01-01 | End: 2022-01-01 | Stop reason: HOSPADM

## 2022-01-01 RX ORDER — MORPHINE SULFATE 2 MG/ML
2 INJECTION, SOLUTION INTRAMUSCULAR; INTRAVENOUS
Status: DISCONTINUED | OUTPATIENT
Start: 2022-01-01 | End: 2022-01-01 | Stop reason: HOSPADM

## 2022-01-01 RX ORDER — SODIUM CHLORIDE 0.9 % (FLUSH) 0.9 %
5-10 SYRINGE (ML) INJECTION AS NEEDED
Status: CANCELLED | OUTPATIENT
Start: 2022-01-01

## 2022-01-01 RX ORDER — MIDAZOLAM HYDROCHLORIDE 1 MG/ML
INJECTION, SOLUTION INTRAMUSCULAR; INTRAVENOUS AS NEEDED
Status: DISCONTINUED | OUTPATIENT
Start: 2022-01-01 | End: 2022-01-01 | Stop reason: HOSPADM

## 2022-01-01 RX ORDER — ONDANSETRON 4 MG/1
4 TABLET, ORALLY DISINTEGRATING ORAL
Qty: 10 TABLET | Refills: 0 | Status: CANCELLED | OUTPATIENT
Start: 2022-01-01

## 2022-01-01 RX ORDER — PROPOFOL 10 MG/ML
INJECTION, EMULSION INTRAVENOUS
Status: DISCONTINUED | OUTPATIENT
Start: 2022-01-01 | End: 2022-01-01 | Stop reason: HOSPADM

## 2022-01-01 RX ORDER — HEPARIN 100 UNIT/ML
500 SYRINGE INTRAVENOUS AS NEEDED
Status: CANCELLED | OUTPATIENT
Start: 2022-01-01

## 2022-01-01 RX ORDER — INSULIN DEGLUDEC INJECTION 200 U/ML
16 INJECTION, SOLUTION SUBCUTANEOUS DAILY
Qty: 2 ADJUSTABLE DOSE PRE-FILLED PEN SYRINGE | Refills: 2 | Status: SHIPPED | OUTPATIENT
Start: 2022-01-01

## 2022-01-01 RX ORDER — BUPIVACAINE HYDROCHLORIDE AND EPINEPHRINE 2.5; 5 MG/ML; UG/ML
INJECTION, SOLUTION EPIDURAL; INFILTRATION; INTRACAUDAL; PERINEURAL AS NEEDED
Status: DISCONTINUED | OUTPATIENT
Start: 2022-01-01 | End: 2022-01-01 | Stop reason: HOSPADM

## 2022-01-01 RX ORDER — DEXTROSE MONOHYDRATE 50 MG/ML
25 INJECTION, SOLUTION INTRAVENOUS CONTINUOUS
Status: DISPENSED | OUTPATIENT
Start: 2022-01-01 | End: 2022-01-01

## 2022-01-01 RX ORDER — ONDANSETRON 2 MG/ML
4 INJECTION INTRAMUSCULAR; INTRAVENOUS ONCE
Status: COMPLETED | OUTPATIENT
Start: 2022-01-01 | End: 2022-01-01

## 2022-01-01 RX ORDER — PEN NEEDLE, DIABETIC 31 GX3/16"
1 NEEDLE, DISPOSABLE MISCELLANEOUS SEE ADMIN INSTRUCTIONS
Qty: 100 PEN NEEDLE | Refills: 11 | Status: SHIPPED | OUTPATIENT
Start: 2022-01-01

## 2022-01-01 RX ORDER — PHENYLEPHRINE HCL 10 MG/1
10 TABLET, FILM COATED ORAL DAILY
COMMUNITY

## 2022-01-01 RX ORDER — SODIUM CHLORIDE 9 MG/ML
10 INJECTION INTRAMUSCULAR; INTRAVENOUS; SUBCUTANEOUS AS NEEDED
Status: CANCELLED | OUTPATIENT
Start: 2022-01-01

## 2022-01-01 RX ORDER — PALONOSETRON 0.05 MG/ML
0.25 INJECTION, SOLUTION INTRAVENOUS ONCE
Status: COMPLETED | OUTPATIENT
Start: 2022-01-01 | End: 2022-01-01

## 2022-01-01 RX ORDER — GLIPIZIDE 5 MG/1
TABLET ORAL
Qty: 90 TABLET | Refills: 0 | Status: SHIPPED | OUTPATIENT
Start: 2022-01-01

## 2022-01-01 RX ORDER — INSULIN DEGLUDEC INJECTION 200 U/ML
16 INJECTION, SOLUTION SUBCUTANEOUS DAILY
Qty: 1 PEN | Refills: 0 | Status: SHIPPED | COMMUNITY
Start: 2022-01-01

## 2022-01-01 RX ORDER — CALCIUM GLUCONATE 20 MG/ML
2 INJECTION, SOLUTION INTRAVENOUS ONCE
Status: COMPLETED | OUTPATIENT
Start: 2022-01-01 | End: 2022-01-01

## 2022-01-01 RX ORDER — SODIUM CHLORIDE 0.9 % (FLUSH) 0.9 %
10 SYRINGE (ML) INJECTION AS NEEDED
Status: CANCELLED | OUTPATIENT
Start: 2022-01-01

## 2022-01-01 RX ORDER — PROPOFOL 10 MG/ML
INJECTION, EMULSION INTRAVENOUS AS NEEDED
Status: DISCONTINUED | OUTPATIENT
Start: 2022-01-01 | End: 2022-01-01 | Stop reason: HOSPADM

## 2022-01-01 RX ORDER — SODIUM CHLORIDE 0.9 % (FLUSH) 0.9 %
10 SYRINGE (ML) INJECTION AS NEEDED
Status: CANCELLED | OUTPATIENT
Start: 2022-01-01 | End: 2022-01-01

## 2022-01-01 RX ORDER — MAGNESIUM SULFATE HEPTAHYDRATE 40 MG/ML
2 INJECTION, SOLUTION INTRAVENOUS ONCE
Status: DISCONTINUED | OUTPATIENT
Start: 2022-01-01 | End: 2022-01-01

## 2022-01-01 RX ORDER — ONDANSETRON 4 MG/1
4 TABLET, ORALLY DISINTEGRATING ORAL
Qty: 30 TABLET | Refills: 0 | Status: SHIPPED | OUTPATIENT
Start: 2022-01-01 | End: 2022-01-01

## 2022-01-01 RX ORDER — DEXTROSE MONOHYDRATE 50 MG/ML
25 INJECTION, SOLUTION INTRAVENOUS CONTINUOUS
Status: CANCELLED | OUTPATIENT
Start: 2022-01-01

## 2022-01-01 RX ORDER — OXYCODONE HYDROCHLORIDE 5 MG/1
5 TABLET ORAL AS NEEDED
Status: DISCONTINUED | OUTPATIENT
Start: 2022-01-01 | End: 2022-01-01 | Stop reason: HOSPADM

## 2022-01-01 RX ORDER — FUROSEMIDE 20 MG/1
20 TABLET ORAL DAILY
Qty: 2 TABLET | Refills: 0 | Status: SHIPPED | OUTPATIENT
Start: 2022-01-01 | End: 2022-01-01

## 2022-01-01 RX ORDER — DOXYCYCLINE HYCLATE 100 MG
100 TABLET ORAL 2 TIMES DAILY
Qty: 14 TABLET | Refills: 0 | Status: SHIPPED | OUTPATIENT
Start: 2022-01-01 | End: 2022-01-01 | Stop reason: ALTCHOICE

## 2022-01-01 RX ORDER — ONDANSETRON 2 MG/ML
4 INJECTION INTRAMUSCULAR; INTRAVENOUS AS NEEDED
Status: DISCONTINUED | OUTPATIENT
Start: 2022-01-01 | End: 2022-01-01 | Stop reason: HOSPADM

## 2022-01-01 RX ORDER — MIDAZOLAM HYDROCHLORIDE 1 MG/ML
1 INJECTION, SOLUTION INTRAMUSCULAR; INTRAVENOUS
Status: DISCONTINUED | OUTPATIENT
Start: 2022-01-01 | End: 2022-01-01 | Stop reason: HOSPADM

## 2022-01-01 RX ORDER — FAMOTIDINE 20 MG/1
20 TABLET, FILM COATED ORAL
COMMUNITY

## 2022-01-01 RX ORDER — FUROSEMIDE 40 MG/1
40 TABLET ORAL DAILY
Qty: 90 TABLET | Refills: 1 | Status: SHIPPED | OUTPATIENT
Start: 2022-01-01 | End: 2022-01-01

## 2022-01-01 RX ORDER — SODIUM CHLORIDE 0.9 % (FLUSH) 0.9 %
5-10 SYRINGE (ML) INJECTION AS NEEDED
Status: DISCONTINUED | OUTPATIENT
Start: 2022-01-01 | End: 2022-01-01 | Stop reason: HOSPADM

## 2022-01-01 RX ORDER — LIDOCAINE HYDROCHLORIDE 20 MG/ML
INJECTION, SOLUTION EPIDURAL; INFILTRATION; INTRACAUDAL; PERINEURAL AS NEEDED
Status: DISCONTINUED | OUTPATIENT
Start: 2022-01-01 | End: 2022-01-01 | Stop reason: HOSPADM

## 2022-01-01 RX ORDER — SODIUM CHLORIDE 0.9 % (FLUSH) 0.9 %
10 SYRINGE (ML) INJECTION AS NEEDED
Status: DISPENSED | OUTPATIENT
Start: 2022-01-01 | End: 2022-01-01

## 2022-01-01 RX ORDER — SODIUM CHLORIDE, SODIUM LACTATE, POTASSIUM CHLORIDE, CALCIUM CHLORIDE 600; 310; 30; 20 MG/100ML; MG/100ML; MG/100ML; MG/100ML
INJECTION, SOLUTION INTRAVENOUS
Status: DISCONTINUED | OUTPATIENT
Start: 2022-01-01 | End: 2022-01-01 | Stop reason: HOSPADM

## 2022-01-01 RX ORDER — PROCHLORPERAZINE MALEATE 10 MG
10 TABLET ORAL
COMMUNITY

## 2022-01-01 RX ORDER — CLINDAMYCIN HYDROCHLORIDE 300 MG/1
300 CAPSULE ORAL DAILY
COMMUNITY
Start: 2021-01-01 | End: 2022-01-01 | Stop reason: ALTCHOICE

## 2022-01-01 RX ORDER — ONDANSETRON 2 MG/ML
INJECTION INTRAMUSCULAR; INTRAVENOUS AS NEEDED
Status: DISCONTINUED | OUTPATIENT
Start: 2022-01-01 | End: 2022-01-01 | Stop reason: HOSPADM

## 2022-01-01 RX ORDER — SODIUM CHLORIDE 9 MG/ML
10 INJECTION INTRAMUSCULAR; INTRAVENOUS; SUBCUTANEOUS AS NEEDED
Status: ACTIVE | OUTPATIENT
Start: 2022-01-01 | End: 2022-01-01

## 2022-01-01 RX ADMIN — FENTANYL CITRATE 25 MCG: 50 INJECTION, SOLUTION INTRAMUSCULAR; INTRAVENOUS at 13:36

## 2022-01-01 RX ADMIN — FENTANYL CITRATE 25 MCG: 50 INJECTION, SOLUTION INTRAMUSCULAR; INTRAVENOUS at 13:43

## 2022-01-01 RX ADMIN — OXALIPLATIN 150.5 MG: 5 INJECTION, SOLUTION, CONCENTRATE INTRAVENOUS at 11:54

## 2022-01-01 RX ADMIN — DENOSUMAB 120 MG: 120 INJECTION SUBCUTANEOUS at 15:07

## 2022-01-01 RX ADMIN — LIDOCAINE HYDROCHLORIDE 80 MG: 20 INJECTION, SOLUTION EPIDURAL; INFILTRATION; INTRACAUDAL; PERINEURAL at 10:51

## 2022-01-01 RX ADMIN — MIDAZOLAM 2 MG: 1 INJECTION INTRAMUSCULAR; INTRAVENOUS at 10:47

## 2022-01-01 RX ADMIN — PROPOFOL 130 MG: 10 INJECTION, EMULSION INTRAVENOUS at 10:51

## 2022-01-01 RX ADMIN — FENTANYL CITRATE 100 MCG: 50 INJECTION, SOLUTION INTRAMUSCULAR; INTRAVENOUS at 10:51

## 2022-01-01 RX ADMIN — ONDANSETRON 4 MG: 2 INJECTION INTRAMUSCULAR; INTRAVENOUS at 14:39

## 2022-01-01 RX ADMIN — SODIUM CHLORIDE, PRESERVATIVE FREE 30 ML: 5 INJECTION INTRAVENOUS at 14:00

## 2022-01-01 RX ADMIN — ONDANSETRON HYDROCHLORIDE 4 MG: 2 INJECTION, SOLUTION INTRAMUSCULAR; INTRAVENOUS at 12:11

## 2022-01-01 RX ADMIN — OXYCODONE 5 MG: 5 TABLET ORAL at 14:20

## 2022-01-01 RX ADMIN — ONDANSETRON HYDROCHLORIDE 4 MG: 2 SOLUTION INTRAMUSCULAR; INTRAVENOUS at 13:24

## 2022-01-01 RX ADMIN — FAMOTIDINE 20 MG: 10 INJECTION INTRAVENOUS at 14:41

## 2022-01-01 RX ADMIN — FLUOROURACIL 708 MG: 50 INJECTION, SOLUTION INTRAVENOUS at 13:58

## 2022-01-01 RX ADMIN — IOPAMIDOL 80 ML: 755 INJECTION, SOLUTION INTRAVENOUS at 13:05

## 2022-01-01 RX ADMIN — LEUCOVORIN CALCIUM 708 MG: 500 INJECTION, POWDER, LYOPHILIZED, FOR SOLUTION INTRAMUSCULAR; INTRAVENOUS at 11:54

## 2022-01-01 RX ADMIN — DEXAMETHASONE SODIUM PHOSPHATE 8 MG: 4 INJECTION, SOLUTION INTRAMUSCULAR; INTRAVENOUS at 11:15

## 2022-01-01 RX ADMIN — SUGAMMADEX 200 MG: 100 INJECTION, SOLUTION INTRAVENOUS at 12:18

## 2022-01-01 RX ADMIN — FLUOROURACIL 4248 MG: 50 INJECTION, SOLUTION INTRAVENOUS at 13:58

## 2022-01-01 RX ADMIN — SODIUM CHLORIDE, PRESERVATIVE FREE 10 ML: 5 INJECTION INTRAVENOUS at 14:35

## 2022-01-01 RX ADMIN — SODIUM CHLORIDE, POTASSIUM CHLORIDE, SODIUM LACTATE AND CALCIUM CHLORIDE: 600; 310; 30; 20 INJECTION, SOLUTION INTRAVENOUS at 10:51

## 2022-01-01 RX ADMIN — Medication 500 UNITS: at 12:57

## 2022-01-01 RX ADMIN — ROCURONIUM BROMIDE 40 MG: 10 SOLUTION INTRAVENOUS at 10:51

## 2022-01-01 RX ADMIN — SODIUM CHLORIDE, PRESERVATIVE FREE 10 ML: 5 INJECTION INTRAVENOUS at 14:59

## 2022-01-01 RX ADMIN — DEXTROSE MONOHYDRATE 25 ML/HR: 50 INJECTION, SOLUTION INTRAVENOUS at 10:49

## 2022-01-01 RX ADMIN — DEXAMETHASONE SODIUM PHOSPHATE 12 MG: 4 INJECTION, SOLUTION INTRAMUSCULAR; INTRAVENOUS at 10:55

## 2022-01-01 RX ADMIN — FENTANYL CITRATE 25 MCG: 50 INJECTION, SOLUTION INTRAMUSCULAR; INTRAVENOUS at 14:17

## 2022-01-01 RX ADMIN — Medication 500 UNITS: at 15:35

## 2022-01-01 RX ADMIN — POTASSIUM CHLORIDE 10 MEQ: 10 INJECTION, SOLUTION INTRAVENOUS at 11:52

## 2022-01-01 RX ADMIN — DENOSUMAB 120 MG: 120 INJECTION SUBCUTANEOUS at 13:58

## 2022-01-01 RX ADMIN — IOPAMIDOL 100 ML: 755 INJECTION, SOLUTION INTRAVENOUS at 12:00

## 2022-01-01 RX ADMIN — FENTANYL CITRATE 50 MCG: 50 INJECTION, SOLUTION INTRAMUSCULAR; INTRAVENOUS at 11:46

## 2022-01-01 RX ADMIN — VANCOMYCIN HYDROCHLORIDE 1000 MG: 10 INJECTION, POWDER, LYOPHILIZED, FOR SOLUTION INTRAVENOUS at 09:40

## 2022-01-01 RX ADMIN — Medication 10 ML: at 09:10

## 2022-01-01 RX ADMIN — Medication 30 MG: at 11:17

## 2022-01-01 RX ADMIN — ROCURONIUM BROMIDE 20 MG: 10 SOLUTION INTRAVENOUS at 11:13

## 2022-01-01 RX ADMIN — PROPOFOL 10 MCG/KG/MIN: 10 INJECTION, EMULSION INTRAVENOUS at 11:07

## 2022-01-01 RX ADMIN — IOHEXOL 50 ML: 240 INJECTION, SOLUTION INTRATHECAL; INTRAVASCULAR; INTRAVENOUS; ORAL at 12:00

## 2022-01-01 RX ADMIN — FENTANYL CITRATE 25 MCG: 50 INJECTION, SOLUTION INTRAMUSCULAR; INTRAVENOUS at 14:24

## 2022-01-01 RX ADMIN — CALCIUM GLUCONATE 2 G: 20 INJECTION, SOLUTION INTRAVENOUS at 11:33

## 2022-01-01 RX ADMIN — POTASSIUM CHLORIDE 10 MEQ: 10 INJECTION, SOLUTION INTRAVENOUS at 10:48

## 2022-01-01 RX ADMIN — PALONOSETRON 0.25 MG: 0.05 INJECTION, SOLUTION INTRAVENOUS at 10:49

## 2022-01-01 RX ADMIN — SODIUM CHLORIDE 1000 ML: 9 INJECTION, SOLUTION INTRAVENOUS at 14:35

## 2022-01-01 RX ADMIN — Medication 120 MCG: at 10:51

## 2022-01-01 RX ADMIN — HEPARIN 500 UNITS: 100 SYRINGE at 14:59

## 2022-01-01 RX ADMIN — MEPERIDINE HYDROCHLORIDE 12.5 MG: 25 INJECTION INTRAMUSCULAR; INTRAVENOUS; SUBCUTANEOUS at 13:13

## 2022-01-01 RX ADMIN — Medication 10 ML: at 12:57

## 2022-01-13 NOTE — PROGRESS NOTES
Palliative Medicine Office Visit  Palliative Medicine Nurse Check In  [phone]    Patient Name: So Apodaca. YOB: 1955      Date of Office Visit: 1/13/2022    Patient states: \"  Was taking Dilaudid 8 mg every 3 hours  \"    From Check In Sheet (scanned in Media):  Has a medical provider talked with you about cardiopulmonary resuscitation (CPR)? [x] Yes   [] No   [] Unable to obtain    Nurse reminder to complete or update ACP FlowSheet:    Is ACP on the Problem List?    [x] Yes    [] No  IF ACP Document is ON FILE; Nurse to place ACP on Problem List     Is there an ACP Note in Chart Review/Note? [x] Yes    [] No   If NO: ALERT PROVIDER       Primary Decision MakerValeta Carmela - [phone]    Secondary Decision Maker: Ridge Cristina III - Child - [phone]    Supplemental (Other) Decision Maker: Anjum Shaikh Child - [phone]  Advance Care Planning 1/13/2022   Patient's [address] is: Named in scanned ACP document   Primary Decision Maker Name -   Primary Decision Maker Phone Number -   Primary Decision Maker Relationship to Patient -   Confirm Advance Directive Yes, on file   Patient Would Like to Complete Advance Directive -   Does the patient have other document types -         Is there anything that we should know about you as a person in order to provide you the best care possible? Have you been to the ER, urgent care clinic since your last visit? [] Yes   [x] No   [] Unable to obtain    Have you been hospitalized since your last visit? [] Yes   [x] No   [] Unable to obtain    Have you seen or consulted any other health care providers outside of the [address] [place] [address] since your last visit?    [] Yes   [x] No   [] Unable to obtain    Functional status (describe):         Last BM: 1/13/2022     accessed (date): 1/13/2022    Bottle review (for opioid pain medication):  Medication 1:   Date filled:   Directions:   # filled: # left:   # pills taking per day:  Last dose taken:    Medication 2:   Date filled:   Directions:   # filled:   # left:   # pills taking per day:  Last dose taken:    Medication 3:   Date filled:   Directions:   # filled:   # left:   # pills taking per day:  Last dose taken:    Medication 4:   Date filled:   Directions:   # filled:   # left:   # pills taking per day:  Last dose taken:

## 2022-01-13 NOTE — PROGRESS NOTES
Palliative Medicine Outpatient Services  [place]: [phone] ([phone])    Patient Name: Karel De Santiago. YOB: 1955    Date of Current Visit: 01/13/22  Location of Current Visit:    [] [facility] Office  [] [facility] Office  [] [address] [place] [address]  [] Home  [x] Other: Virtual synchronous A/V visit    Date of Initial Visit: 4/6/2020  Referral from: Dr. Emanuel Winter  Primary Care Physician: Sharon Viramontes DO      SUMMARY:   Karel Manriquez is a 73y.o. 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 Emanuel Winter for management of symptoms and psychosocial support. 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 This is second marriage for both of them. Current treatment-completed RT to the para-aortic mass, pursuing diagnostics with GI physician Dr. Shawna Belle for gastroparesis and pancreatic enzymes, has had biliary stents replaced. Status post celiac plexus block and reversal of Whipple procedure on 9/9/2020    Hospitalization at [facility] for uncontrolled pain in December 2020    L5 biopsy, ablation and kyphoplasty on 1/12/2021    Current treatment-on cisplatin plus gemcitabine plus Abraxane, chemotherapy currently on hold  chemotherapy versus functional diarrhea    Status post ostiocool procedure to the right hip     PALLIATIVE DIAGNOSES:       ICD-10-CM ICD-9-CM    1. Cholangiocarcinoma of biliary tract (HCC)  C22.1 155.1 oxyCODONE IR (ROXICODONE) 10 mg tab immediate release tablet   2.  Pain from bone metastases (HCC)  G89.3 338.3 oxyCODONE IR (ROXICODONE) 10 mg tab immediate release tablet    C79.51     3. Primary osteoarthritis of both hips  M16.0 715.15 oxyCODONE IR (ROXICODONE) 10 mg tab immediate release tablet      lidocaine (LIDODERM) 5 %   4. Functional diarrhea  K59.1 564.5           PLAN:   Patient Instructions   Dear Sun Mendenhall.,     It was a pleasure seeing you today by virtual video visit.     This is the plan we talked about:    -Disease discussion  -We reviewed your most recent CT and bone scan. There is progression of disease in the lung nodules. You are having more pain in your left lower back but this does not correlate with new bony mets in the left side. However, there is left hydronephrosis which is kidney swelling from the retroperitoneal tumor pushing on your renal vein. This could be the cause of your flank pain. Please call your urologist to make an appointment. .     -Chronic low back pain from new L5 met status post ablation and kyphoplasty, new and progressive right hip pain from sacral met  -This pain remains high even after most recent osteocool procedure, you now have pain in the mid back.  -Increase methadone 20 mg 3 times a day   -He started developing severe hives few minutes after taking Dilaudid and so he decided to discontinue it. No more hives since. He have not been taking anything for breakthrough pain.  -Start oxycodone 10 mg tablet, take 1 to 2 tablets every 4 hours as needed for pain. -Apply lidocaine 5% patch locally which helps, refill provided    -Chemo-induced diarrhea  -This is resolved. You are no longer on tincture of opium.    -Sjogren's disease  -You are very careful to stay well hydrated. You drink water and sugar-free gatorade or powerade.     -Multivitamin deficiency  -You were found to have severe vitamin A, vitamin D, vitamin E deficiency. You are on replacement therapy now. You continue to follow with your PCP about this.  You had labs drawn in August and have had your vitamin supplementation adjusted due to this.    -Fatigue  -Sometimes you cannot do what you want to do because of the tiredness of your legs. You can get what you need to do done as long as you take breaks.    -Nausea  Your nausea has returned. I provided you with Compazine.    -Follow up  -W I will see you again in 4 weeks       reviewed and appropriate. Most recent CT scan reviewed and results discussed with patient. See results below in data portion of note.        GOALS OF CARE / TREATMENT PREFERENCES:   [====Goals of Care====]  GOALS OF CARE:  Patient / health care proxy stated goals: See Patient Instructions / Summary    TREATMENT PREFERENCES:   Code Status:  [x] Attempt Resuscitation       [] Do Not Attempt Resuscitation    Advance Care Planning:  [x] [facility] Inter[facility] Team has updated the ACP Navigator with Decision Maker and Patient Capacity      Primary Decision MakerHeide [place] - [phone]    Secondary Decision Maker: Ridge Cristina III - Child - [phone]    Supplemental (Other) Decision Maker: Seema Denney Child - [phone]  Advance Care Planning 1/13/2022   Patient's Healthcare Decision Maker is: Named in scanned ACP document   Primary Decision Maker Name -   Primary Decision [facility] Phone Number -   Primary Decision Maker Relationship to Patient -   Confirm Advance Directive Yes, on file   Patient Would Like to Complete Advance Directive -   Does the patient have other document types -       Other:  (If patient appropriate for POST, consider using PALLPOST smart phrase here)    The palliative care team has discussed with patient / health care proxy about goals of care / treatment preferences for patient.  [====Goals of Care====]     PRESCRIPTIONS GIVEN:     Medications Ordered Today   Medications    oxyCODONE IR (ROXICODONE) 10 mg tab immediate release tablet     Sig: Take 2 Tablets by mouth every four (4) hours as needed for Pain for up to 15 days. Max Daily Amount: 120 mg. Dispense:  180 Tablet     Refill:  0    lidocaine (LIDODERM) 5 %     Si Patch by TransDERmal route every twenty-four (24) hours. Apply patch to the affected area for 12 hours a day and remove for 12 hours a day. Dispense:  1 Each     Refill:  5           FOLLOW UP:     Future Appointments   Date Time Provider Sloane Marley   2022  1:30 PM [facility] [address] [facility]   2/10/2022  3:00 PM Loretta Head 4 [address] REG   2/10/2022  3:15 PM Matt Lyon NP ONCMR [facility]           PHYSICIANS INVOLVED IN CARE:   Patient Care Team:  Anita Rios DO as PCP - General (Internal Medicine)  Anita Rios DO as PCP - [facility] Empaneled Provider  Dalton Pichardo MD (General Surgery)  Bonnie Contreras MD (Gastroenterology)  Jessika Rojas MD (Gastroenterology)  Inga Cota MD (Hematology and Oncology)  James Rivera MD as Palliative Care (Palliative Medicine)  Anastasiya Ramirez RN as Benefits Care Manager       HISTORY:   Reviewed patient-completed ESAS and advance care planning form. Reviewed patient record in prescription monitoring program.    CHIEF COMPLAINT:   No chief complaint on file. HPI/SUBJECTIVE:    The patient is: [x] Verbal / [] Nonverbal     Patient complains mid back pain. His hip/pelvic pain resolved somewhat after the procedure but now he feels that the pain has just moved to a different spot. Surprisingly, he started developing severe hives after taking Dilaudid. No other new medication or dietary changes. He electively discontinued Dilaudid and the hives resolved. He is now rating his pain as 7 out of 10 without breakthrough medication. Diarrhea is better. -------    Patient here with his wife. Both are very good historians. Mid upper back pain-much improved since radiation to the periaortic mass. He struggled with pain for several months before it was identified as cancer recurrence. He was started on fentanyl 25 mcg patch which he wants to wean off of. He has made upper and mid zone abdominal pain which comes and goes. He has not noticed any specific pattern to the pain but usually the pain comes on before vomiting or relieves without vomiting. Sometimes, he vomits food that he ate about 12 to 14 hours ago. No constipation. He has 2 liquid bowel movements every day. He is unable to tolerate eggs or honey. He is getting tests done to evaluate his pancreatic enzymes. He has very good support through his wife. Clinical Pain Assessment (nonverbal scale for nonverbal patients):   [++++ Clinical Pain Assessment++++]  [++++Pain Severity++++]: Pain: 7  [++++Pain Character++++]: cramping  [++++Pain Duration++++]: months  [++++Pain Effect++++]: functional   [++++Pain Factors++++]: none in particular  [++++Pain Frequency++++]: on and off  [++++Pain Location++++]: upper abdomen and mid back  [++++ Clinical Pain Assessment++++]       FUNCTIONAL ASSESSMENT:     Palliative Performance Scale (PPS):  PPS: 70       PSYCHOSOCIAL/SPIRITUAL SCREENING:     Any spiritual / Evangelical concerns:  [] Yes /  [x] No    Caregiver Burnout:  [] Yes /  [x] No /  [] No Caregiver Present      Anticipatory grief assessment:   [x] Normal  / [] Maladaptive       ESAS Anxiety: Anxiety: 0    ESAS Depression: Depression: 0       REVIEW OF SYSTEMS:     The following systems were [x] reviewed / [] unable to be reviewed  Systems: constitutional, ears/nose/mouth/throat, respiratory, gastrointestinal, genitourinary, musculoskeletal, integumentary, neurologic, psychiatric, endocrine. Positive findings noted below.   Modified ESAS Completed by: provider   Fatigue: 5 Drowsiness: 5   Depression: 0 Pain: 7   Anxiety: 0 Nausea: 5   Anorexia: 2 Dyspnea: 3   Best Well-Bein Constipation: Yes   Other Problem (Comment): 0          PHYSICAL EXAM:     Wt Readings from Last 3 Encounters:   21 145 lb (65.8 kg)   21 152 lb 9.6 oz (69.2 kg)   11/18/21 156 lb 6.4 oz (70.9 kg)     There were no vitals taken for this visit. Last bowel movement: See Nursing Note    Constitutional    [x] Appears well-developed and well-nourished in no apparent distress    [] Abnormal:  Mental status  [x] Alert and awake  [x] Oriented to person/place/time  [x] Able to follow commands  [] Abnormal:   Eyes  [x] EOM normal   [x] Sclera normal   [x] No visible ocular discharge  [] Abnormal:   HENT  [x] Normocephalic, atraumatic  [x] Mouth/Throat: Moist mucous membranes   [x] External Ears normal  [] Abnormal:  Neck  [x] No visualized mass  [] Abnormal:  Pulmonary/Chest   [x] Respiratory effort normal  [x] No visualized signs of difficulty breathing or respiratory distress  [] Abnormal:  Musculoskeletal  [x] Normal gait with no signs of ataxia  [x] Normal range of motion of neck  [] Abnormal:  Neurological:   [x] No facial asymmetry (Cranial nerve 7 motor function)  [x] No gaze palsy  [] Abnormal:   Skin  [x] No significant exanthematous lesions or discoloration noted on facial skin  [] Abnormal:                                  Psychiatric  [x] Normal affect  [x] No hallucinations  [] Abnormal:    Other pertinent observable physical exam findings:    Due to this being a TeleHealth evaluation, many elements of the physical examination are unable to be assessed. HISTORY:     Past Medical History:   Diagnosis Date    Aneurysm ([facility] [address] 2008    CEREBRAL    Arrhythmia     Previous a.fib, ablation, NSR now; NO LONGER FOLLOWED BY CARDIOLOGIST.     Autoimmune disease ([address]     SJOGREN'S    Cancer ([facility] [address] 2020    COMMON BILE DUCT, ADENOCARCINOMA, SPINE    Diabetes ([address]     NIDDM    Family history of skin cancer     Hypertension     Sepsis ([address] 2017    STENTING TO BILE DUCT    Status post chemotherapy     Stroke [facility]) 2008    brain aneurysm - no deficits    Sun-damaged skin     Sunburn, blistering       Past Surgical History:   Procedure Laterality Date    HX CHOLECYSTECTOMY      HX GI      COLONOSCOPY, POLYPS (BENIGN)    [facility]  [facility]/06/2017    [facility]     HX GI  2020    WHIPPSHAZIA REVISION    HX HEART CATHETERIZATION  2005    Ablation     HX HERNIA REPAIR  12/2020    HERNIA REPAIR    HX ORTHOPAEDIC  2000    Spinal fusion W/ HARDWARE    HX OTHER SURGICAL  11/07/2017    [place] [facility], Dr. Luis Guzman HX OTHER SURGICAL  01/11/2021    RIGHT IJ PORT-A-CATH PLACEMENT     HX VASCULAR ACCESS  2017    PORTACATH - then removed    IR ABLATE BONE TUMOR PERC W CRYO  12/23/2021    IR KYPHOPLASTY LUMBAR  1/12/2021    NEUROLOGICAL PROCEDURE UNLISTED  2005    Ting hole washout from cerebral hemorrhage    AL ABDOMEN SURGERY PROC UNLISTED  10/2017    APRIL      Family History   Problem Relation Age of Onset    Cancer Father         Breast and Colon, MELANOMA    Hypertension Father     Cancer Mother         LEUKEMIA    No Known Problems Sister     Atrial Fibrillation Brother     No Known Problems Sister     No Known Problems Son     No Known Problems Son     Anesth Problems Neg Hx       History reviewed, no pertinent family history. Social History     Tobacco Use    Smoking status: Never Smoker    Smokeless tobacco: Never Used   Substance Use Topics    Alcohol use: Never     Comment: very rare     Allergies   Allergen Reactions    Shellfish Derived Anaphylaxis     Soft shell crabs    Morphine Nausea and Vomiting    Pcn [Penicillins] Other (comments)     Pt stated \"always been told PCN\"  Pt tolerated other cephalosporins in the past      Current Outpatient Medications   Medication Sig    oxyCODONE IR (ROXICODONE) 10 mg tab immediate release tablet Take 2 Tablets by mouth every four (4) hours as needed for Pain for up to 15 days. Max Daily Amount: 120 mg.    lidocaine (LIDODERM) 5 % 1 Patch by TransDERmal route every twenty-four (24) hours. Apply patch to the affected area for 12 hours a day and remove for 12 hours a day.     ferrous sulfate 325 mg (65 mg iron) tablet Take 325 mg by mouth Daily (before breakfast).  methadone (DOLOPHINE) 10 mg tablet Take 1 Tablet by mouth every eight (8) hours for 30 days. Max Daily Amount: 30 mg. (Patient taking differently: Take 20 mg by mouth every eight (8) hours.)    dutasteride (AVODART) 0.5 mg capsule TAKE ONE CAPSULE BY MOUTH DAILY    tamsulosin (FLOMAX) 0.4 mg capsule TAKE 1 CAPSULE BY MOUTH ONCE EVERY EVENING    dexAMETHasone (DECADRON) 4 mg tablet Take 4 mg by mouth daily (with breakfast).  diphenoxylate-atropine (LomotiL) 2.5-0.025 mg per tablet Take 1 Tablet by mouth four (4) times daily as needed for Diarrhea. Max Daily Amount: 4 Tablets.  ondansetron (Zofran ODT) 4 mg disintegrating tablet Take 1 Tablet by mouth every eight (8) hours as needed for Nausea.  dicyclomine (BENTYL) 10 mg capsule Take 1 Capsule by mouth three (3) times daily as needed for Abdominal Cramps.  Creon 36,000-114,000- 180,000 unit cpDR capsule TAKE TWO TO THREE CAPSULES BY MOUTH WITH EACH MEAL AND ONE CAPSULE FOR SNACKS DAILY    colestipoL (COLESTID) 1 gram tablet     cyanocobalamin (VITAMIN B12) 1,000 mcg/mL injection 1 mL by IntraMUSCular route every seven (7) days.  calcium carbonate (CALCIUM 500 PO) Take  by mouth.  azelastine (ASTEPRO) 205.5 mcg (0.15 %) 2 Sprays by Both Nostrils route two (2) times a day.  vitamin A (AQUASOL A) 10,000 unit capsule TAKE 1 CAPSULE BY MOUTH ONE TIME A DAY    OLANZapine (ZyPREXA) 10 mg tablet Take 1 Tab by mouth See Admin Instructions. Take nightly for 4 days starting the evening of chemotherapy.  lidocaine-prilocaine (EMLA) topical cream Apply  to affected area as needed for Pain. 30-45 min prior to treatments    naloxone [facility]) 4 mg/actuation nasal spray Use 1 spray intranasally, then discard. Repeat with new spray every 2 min as needed for opioid overdose symptoms, alternating nostrils.     aluminum & magnesium hydroxide-simethicone (Maalox Maximum Strength) 400-400-40 mg/5 mL suspension Take 10 mL by mouth every six (6) hours as needed for Indigestion.  vitamin E (AQUA GEMS) 400 unit capsule Take 1 Cap by mouth daily.  empagliflozin (Jardiance) 25 mg tablet Take 1 Tab by mouth nightly.  gabapentin (NEURONTIN) 100 mg capsule Take 3 capsules by mouth twice a day.  ramipriL (ALTACE) 5 mg capsule Take 1 Cap by mouth daily. (Patient taking differently: Take 5 mg by mouth nightly.)    lidocaine (LIDODERM) 5 % 1 Patch by TransDERmal route every twenty-four (24) hours. Apply patch to the affected area for 12 hours a day and remove for 12 hours a day.  finasteride (PROSCAR) 5 mg tablet TAKE 1 TABLET BY MOUTH EVERY DAY    acetaminophen (TYLENOL) 325 mg tablet Take 2 Tabs by mouth every four (4) hours as needed for Pain or Fever (Over the counter medication).  alpha-D-galactosidase (BEANO PO) Take 1 Tab by mouth two (2) times a day.  cetirizine (ZYRTEC) 10 mg tablet Take 10 mg by mouth nightly.  azithromycin (ZITHROMAX) 250 mg tablet  (Patient not taking: Reported on 12/21/2021)    pantoprazole (PROTONIX) 40 mg tablet TAKE 1 TABLET BY MOUTH EVERY DAY (Patient not taking: Reported on 12/21/2021)    loperamide (IMODIUM) 2 mg capsule Take 2-4 mg by mouth as needed for Diarrhea. Give 4 mg after first loose stool. Give an additional 2 mg after each loose stool as needed up to a maximum of 8 doses (16 mg/day). (Patient not taking: Reported on 12/21/2021)     No current facility-administered medications for this visit.           LAB DATA REVIEWED:     Lab Results   Component Value Date/Time    WBC 5.1 11/02/2021 11:06 AM    HGB 12.0 (L) 11/02/2021 11:06 AM    PLATELET 57 (L) 94/16/2871 11:06 AM     Lab Results   Component Value Date/Time    Sodium 136 11/18/2021 03:10 PM    Potassium 3.3 (L) 11/18/2021 03:10 PM    Chloride 105 11/18/2021 03:10 PM    CO2 25 11/18/2021 03:10 PM    BUN 10 11/18/2021 03:10 PM    Creatinine 0.83 11/18/2021 03:10 PM Calcium 8.4 (L) 11/18/2021 03:10 PM    Magnesium 2.3 11/18/2021 03:10 PM    Phosphorus 2.2 (L) 11/18/2021 03:10 PM      Lab Results   Component Value Date/Time    Alk. phosphatase 176 (H) 11/18/2021 03:10 PM    Protein, total 6.0 (L) 11/18/2021 03:10 PM    Albumin 3.3 (L) 11/18/2021 03:10 PM    Globulin 2.7 11/18/2021 03:10 PM     Lab Results   Component Value Date/Time    INR 1.1 09/09/2020 02:15 AM    Prothrombin time 11.5 (H) 09/09/2020 02:15 AM    aPTT 29.9 09/09/2020 02:15 AM      Lab Results   Component Value Date/Time    Iron 31 (L) 01/02/2020 03:24 AM    TIBC 245 (L) 01/02/2020 03:24 AM    Iron % saturation 13 (L) 01/02/2020 03:24 AM    Ferritin 122 01/02/2020 03:24 AM     MRI- Dec 2021     IMPRESSION  1. Osseous metastases in the right hemisacrum and right posterosuperior  acetabulum. 2.  Thin-walled rim-enhancing fluid signal focus in the right inguinal region,  indeterminate, but differential considerations include necrotic metastatic lymph  node and fluid collection (e.g., seroma, abscess). Correlate clinically. 3.  Patchy edema signal and enhancement within and surrounding the left anterior  inferior iliac spine and rectus femoris origin, may reflect enthesopathic change  and/or age indeterminate rectus femoris avulsion injury. 4.  Bilateral hip joint osteoarthritis with (likely) degenerative superior  acetabular labral tears.         CT- Nov 2021  IMPRESSION  1. Progression of lung metastases. 2. Stable retroperitoneal tumor causing vascular occlusions/stenoses and left  UPJ obstruction. 3. New sclerotic bone focus/metastasis. Chest CT- aug 2021       IMPRESSION  1. Multiple pulmonary nodules some of which have increased in size which may  represent progression of disease. Several new nodules are noted.     2. Nonspecific foci of enhancement in the right lobe the liver. These may  represent focal areas of inflammation or hyperemia given the  hepaticojejunostomy.  Attention on follow-up imaging     3. Status post Whipple procedure.     4.  Stable periaortic soft tissue density.     5.  Stable perinephric stranding and fluid around the left kidney hilum       CT chest, abdomen and pelvis-May 2021  IMPRESSION     1. Unchanged appearance of the chest, abdomen, and pelvis when compared to  3/1/2021. No acute findings or evidence of disease progression. 2. Unchanged small pulmonary nodules. Unchanged retroperitoneal soft tissue mass  with left renal vein occlusion and collateral retroperitoneal venous structures.       CT abd 3/1/21  IMPRESSION  1. No significant change in the appearance of pulmonary nodules. 2. No significant change in the appearance of retroperitoneal adenopathy/mass. There is mass effect upon the vasculature, including occlusion of the left renal  vein with collateral vessels in the retroperitoneum, likely unchanged. 3. Incidental findings as above. CT chest 12/29/2020  IMPRESSION:     1. Numerous tiny pulmonary parenchymal nodules right greater than left,  suspicious for metastatic disease. 2. Focal ill-defined opacity in the right upper lobe which may represent  infiltrate. Follow-up recommended, however. 3. Incidental findings in the upper abdomen described earlier same day.      CONTROLLED SUBSTANCES SAFETY ASSESSMENT (IF ON CONTROLLED SUBSTANCES):     Reviewed opioid safety handout:  [x] Yes   [] No  24 hour opioid dose >150mg morphine equivalent/day:  [] Yes   [x] No  Benzodiazepines:  [] Yes   [x] No  Sleep apnea:  [] Yes   [x] No  Urine Toxicology Testing within last 6 months:  [] Yes   [x] No  History of or new aberrant medication taking behaviors:  [] Yes   [x] No  Has Narcan been prescribed [x] Yes   [] No          Total time: 45 minutes   Counseling / coordination time: 40  > 50% counseling / coordination?:   Consent:  He and/or health care decision maker is aware that that he may receive a bill for this telehealth service, depending on his insurance coverage, and has provided verbal consent to proceed: Yes    CPT Codes 13626-78971 for Established Patients may apply to this Telehealth Visit  Pursuant to the emergency declaration under the [address] [place] [address] waiver authority and the [employer] Act, this Virtual  Visit was conducted, with patient's consent, to reduce the patient's risk of exposure to COVID-19 and provide continuity of care for an established patient. Services were provided through a video synchronous discussion virtually to substitute for in-person clinic visit.

## 2022-01-13 NOTE — PATIENT INSTRUCTIONS
Dear Diane Hood.,     It was a pleasure seeing you today by virtual video visit.     This is the plan we talked about:    -Disease discussion  -We reviewed your most recent CT and bone scan. There is progression of disease in the lung nodules. You are having more pain in your left lower back but this does not correlate with new bony mets in the left side. However, there is left hydronephrosis which is kidney swelling from the retroperitoneal tumor pushing on your renal vein. This could be the cause of your flank pain. Please call your urologist to make an appointment. .     -Chronic low back pain from new L5 met status post ablation and kyphoplasty, new and progressive right hip pain from sacral met  -This pain remains high even after most recent osteocool procedure, you now have pain in the mid back.  -Increase methadone 20 mg 3 times a day   -He started developing severe hives few minutes after taking Dilaudid and so he decided to discontinue it. No more hives since. He have not been taking anything for breakthrough pain.  -Start oxycodone 10 mg tablet, take 1 to 2 tablets every 4 hours as needed for pain. -Apply lidocaine 5% patch locally which helps, refill provided    -Chemo-induced diarrhea  -This is resolved. You are no longer on tincture of opium.    -Sjogren's disease  -You are very careful to stay well hydrated. You drink water and sugar-free gatorade or powerade.     -Multivitamin deficiency  -You were found to have severe vitamin A, vitamin D, vitamin E deficiency. You are on replacement therapy now. You continue to follow with your PCP about this. You had labs drawn in August and have had your vitamin supplementation adjusted due to this.    -Fatigue  -Sometimes you cannot do what you want to do because of the tiredness of your legs. You can get what you need to do done as long as you take breaks.    -Nausea  Your nausea has returned.   I provided you with Compazine.    -Follow up  -W I will see you again in 4 weeks       reviewed and appropriate. Most recent CT scan reviewed and results discussed with patient. See results below in data portion of note.

## 2022-01-13 NOTE — TELEPHONE ENCOUNTER
Called Mrs. Cristina to obtain current information on patient:    States that Medicare A & B is primary  2nd:  [employer], Medicare Supp. Plan D, [address] [facility], [place], [address], Phone:  [phone], ID number:  [id number] . Patient has Joaquin Ortiz for [employer]  ID:  [id number]    Krista Ross at [employer] who confirmed insurance plan effective since 9/1/2021, call reference number:  [id number] (time:  8:47am).

## 2022-01-20 NOTE — TELEPHONE ENCOUNTER
----- Message from [place]. Yo Hugo. sent at 1/20/2022  3:02 PM EST -----  Regarding: RE: Methadone Refill  Hi Dr. Lobo Freeman has 5 tablets of the Methadone remaining. He is taking 1 tablet three times per day. Timmy Scott on [address] [facility].   Thank You,  Manish 60

## 2022-01-20 NOTE — TELEPHONE ENCOUNTER
Triage for Controlled Substance Refill Request    Pain Diagnosis: _Cholangiocarcinoma of biliary tract ([facility] [address] (C22.1)    Last Outpatient Visit: _1/132022    Next Outpatient Visit: _2/10/2022    Reason for refill needed outside of office visit? -Appointment not scheduled prior to need for scheduled refill      Pharmacy: [facility] [id number] [facility] [address] [place] [address] TRAIL AT [facility] [address] (DOLOPHINE) 10 mg      Dose and directions: Take 1 Tablet by mouth every eight (8) hours  Number dispensed:90  Date filled ( or Pharmacy):12/21/2021  #left:     reviewed: _yes    Date of Urine Drug Screen:  _n/a    Opioid Safety Handout given:  _yes    Appropriate for refill:  _yes    Action:  _ please refill

## 2022-01-21 NOTE — TELEPHONE ENCOUNTER
The patient called to advise his regular [facility] does not have Methadone 10 mg.  He is asking to have it called in to [facility] in [facility]

## 2022-01-31 NOTE — PROGRESS NOTES
Pt wife sent a "[employer]t message regarding weight loss and pain uncontrolled. VORB FROM DR Brad Barbour CT CHEST ABD PELV W CONTRAST AND BONE SCAN. Scans then visit.

## 2022-02-03 NOTE — TELEPHONE ENCOUNTER
Triage for Controlled Substance Refill Request     Pain Diagnosis: _Cholangiocarcinoma of biliary tract ([facility] [address] (C22.1)     Last Outpatient Visit: _2/998968     Next Outpatient Visit: _2/10/2022     Reason for refill needed outside of office visit? -Appointment not scheduled prior to need for scheduled refill        Pharmacy: [facility] #[id number] - [place], [address] TRA[address] AT [address]        Medication:Hydromorphone 8 mg -  Dose and directions: Take 1 Tablet by mouth every four (4) hours  Number dispensed:90  Date filled ( or Pharmacy):  #left:      reviewed: _yes     Date of Urine Drug Screen:  _n/a     Opioid Safety Handout given:  _yes     Appropriate for refill:  _yes     Action:  _ please refill

## 2022-02-07 NOTE — TELEPHONE ENCOUNTER
Calling patient to advise of Virtual Visit with Dr. Ad Brooks on   2/10/2022   . A nurse will call you between the hours of 9:00am and 1:30pm.  If you have any questions, please call [phone]. No answer. Will send a my chart message.

## 2022-02-09 NOTE — PROGRESS NOTES
Isma Miller. is a 73 y.o. male here for follow up for met cholangiocarcinoma. He is receiving Xgeva today. Pt states he has been doing ok. He has gotten his tooth pulled. 1. Have you been to the ER, urgent care clinic since your last visit? Hospitalized since your last visit? no  2. Have you seen or consulted any other health care providers outside of the [address] [place] [address] since your last visit? Include any pap smears or colon screening.  Dentist

## 2022-02-10 NOTE — PROGRESS NOTES
[address] [facility] Visit Note    8899- Pt arrived to [facility] ambulatory in no acute distress for Xgeva. Assessment unremarkable except dyspnea with exertion, nausea, and generalized fatigue/weakness. R chest port accessed without issue and positive blood return noted. Patient denies any recent or upcoming dental work. Visit Vitals  /66   Pulse 88   Temp 98.7 °F (37.1 °C)   Resp 16   Wt 65.9 kg (145 lb 4.8 oz)   SpO2 97%   BMI 22.09 kg/m²     Patient denied having any symptoms of COVID-19, i.e. SOB, coughing, fever, or generally not feeling well. Also denies having been exposed to COVID-19 recently or having had any recent contact with family/friend that has a pending COVID test.     Labs Obtained - Chem8, Hepatic Function Panel, Mag, Phos, Ca 19-9  Recent Results (from the past 12 hour(s))   HEPATIC FUNCTION PANEL    Collection Time: 02/10/22  2:50 PM   Result Value Ref Range    Protein, total 5.9 (L) 6.4 - 8.2 g/dL    Albumin 2.9 (L) 3.5 - 5.0 g/dL    Globulin 3.0 2.0 - 4.0 g/dL    A-G Ratio 1.0 (L) 1.1 - 2.2      Bilirubin, total 0.6 0.2 - 1.0 MG/DL    Bilirubin, direct 0.3 (H) 0.0 - 0.2 MG/DL    Alk. phosphatase 130 (H) 45 - 117 U/L    AST (SGOT) 5 (L) 15 - 37 U/L    ALT (SGPT) 12 12 - 78 U/L   PHOSPHORUS    Collection Time: 02/10/22  2:50 PM   Result Value Ref Range    Phosphorus 2.4 (L) 2.6 - 4.7 MG/DL   POC CHEM8    Collection Time: 02/10/22  2:58 PM   Result Value Ref Range    Calcium, ionized (POC) 1.16 1.12 - 1.32 mmol/L    Sodium (POC) 132 (L) 136 - 145 mmol/L    Potassium (POC) 4.0 3.5 - 5.1 mmol/L    Chloride (POC) 98 98 - 107 mmol/L    CO2 (POC) 26.6 21 - 32 mmol/L    Anion gap (POC) 8 (L) 10 - 20 mmol/L    Glucose (POC) 208 (H) 65 - 100 mg/dL    Creatinine (POC) 0.96 0.6 - 1.3 mg/dL    GFRAA, POC >60 >60 ml/min/1.73m2    GFRNA, POC >60 >60 ml/min/1.73m2    Comment Comment Not Indicated.        The following medications administered:  Medications Administered denosumab (XGEVA) injection 120 mg     Admin Date  02/10/2022 Action  Given Dose  120 mg Route  SubCUTAneous Administered By  Davis Francis RN          heparin (porcine) pf 500 Units     Admin Date  02/10/2022 Action  Given Dose  500 Units Route  IntraVENous Administered By  Davis Francis RN          sodium chloride (NS) flush 5-10 mL     Admin Date  02/10/2022 Action  Given Dose  10 mL Route  IntraVENous Administered By  Davis Francis RN              0606- Pt tolerated treatment well. Port flushed per policy and de-accessed, 2x2 and tape placed. Pt discharged ambulatory in no acute distress, accompanied by self. Next appointment 3/10/22.

## 2022-02-10 NOTE — PROGRESS NOTES
[address] [facility] [address], [address] [facility], [address] [place] [address], [address] [place] [address]  [phone]    Follow-up Note      Patient: Candida Carrasco MRN: [id number]  SSN: [id number]    YOB: 1955  Age: 73 y.o. Sex: male        Diagnosis:     1. 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 S/P Pancreatoduodenectomy on  10/06/2017  2. Adjuvant monotherapy with Capecitabine - s/p 6 cycles   (12/4/2017 - 05/2018)  3. SBRT RP node/soft tissue 04/2020  4. Palliative chemotherapy   Cis/[place]/Abraxane - s/p 6 cycles - discontinued d/t progression of disease    Subjective:      Candida Carrasco is a 73 y.o. male with a diagnosis of extrahepatic cholangiocarcinoma with metastatic to the lungs, bones and retroperitoneal node/soft tissue. He presented to the ED in August 2017 with obstructive jaundice. He was found to have an obstructive lesion in the dital bile duct. The CT showed marked intrahepatic biliary dilation and common bile duct dilation to the level of the mid common bile duct, which ws markedly narrowed. He underwent a stenting procedure followed by spyglass cholangiogram. The brushings revealed a diagnosis of cholangiocarcinoma. He underwent a Whipple procedure on 10/06/2017. He completed 6 cycles of adjuvant Xeloda. PET scan showed increased activity in the soft tissue along the SMA. CT guided biopsy showed local recurrence. He underwent SBRT by Dr. Dewitt Councilman in April 2020. He was admitted with severe back pain. Repeat CT shows growth of tumor in the L4/L5, lung and RP node/soft tissue. He underwent a CT guided celiac plexus block which has helped the back pain immensely. A biopsy of the L4 vertebrae confirms a diagnosis of metastatic cholangiocarcinoma.       Jonnie received palliative chemotherapy. He has been off of treatment since the end of May 2021. Imaging shows slow progression of disease. Due to slow progression and the fact that the disease is not measurable by RECIST criteria, we have elected to just do surveillance. He is experiencing worsening pain in the L5 vertebrae. Review of Systems:    Constitutional: fatigue  Eyes: negative  Ears, Nose, Mouth, Throat, and Face: negative  Respiratory: negative  Cardiovascular: negative  Gastrointestinal: negative  Genitourinary:negative  Integument/Breast: negative  Hematologic/Lymphatic: negative  Musculoskeletal: low back pain  Neurological: numbness/tingling B/L feet    Review of systems was reviewed and updated as needed on 02/10/22. Past Medical History:   Diagnosis Date    Aneurysm [facility]) 2008    CEREBRAL    Arrhythmia     Previous a.fib, ablation, NSR now; NO LONGER FOLLOWED BY CARDIOLOGIST.     Autoimmune disease ([facility] [address]     SJOGREN'S    Cancer ([facility] [address] 2020    COMMON BILE DUCT, ADENOCARCINOMA, SPINE    Diabetes ([facility] [address]     NIDDM    Family history of skin cancer     Hypertension     Sepsis ([facility] [address] 2017    STENTING TO BILE DUCT    Status post chemotherapy     Stroke [facility]) 2008    brain aneurysm - no deficits    Sun-damaged skin     Sunburn, blistering      Past Surgical History:   Procedure Laterality Date    HX CHOLECYSTECTOMY      HX GI      COLONOSCOPY, POLYPS (BENIGN)    HX GI  10/06/2017    Whipple Dr. Skyler Sanchez [facility] [place]     HX GI  2020    WHIPPLE REVISION    HX HEART CATHETERIZATION  2005    Ablation     HX HERNIA REPAIR  12/2020    HERNIA REPAIR    HX ORTHOPAEDIC  2000    Spinal fusion W/ HARDWARE    HX OTHER SURGICAL  11/07/2017    [place] Cath, Dr. Cece Crane  01/11/2021    RIGHT IJ PORT-A-CATH PLACEMENT     HX VASCULAR ACCESS  2017    PORTACATH - then removed    IR ABLATE BONE TUMOR PERC W CRYO  12/23/2021    IR KYPHOPLASTY LUMBAR  1/12/2021    NEUROLOGICAL PROCEDURE UNLISTED  2005    [place] hole washout from cerebral hemorrhage    [place] ABDOMEN SURGERY PROC UNLISTED  10/2017    [place]      Family History   Problem Relation Age of Onset    Cancer Father         Breast and Colon, MELANOMA    Hypertension Father     Cancer Mother         LEUKEMIA    No Known Problems Sister     Atrial Fibrillation Brother     No Known Problems Sister     No Known Problems Son     No Known Problems Son     Anesth Problems Neg Hx      Social History     Tobacco Use    Smoking status: Never Smoker    Smokeless tobacco: Never Used   Substance Use Topics    Alcohol use: Never     Comment: very rare      Prior to Admission medications    Medication Sig Start Date End Date Taking? Authorizing Provider   diclofenac (VOLTAREN) 1 % gel Apply  to affected area four (4) times daily. 2/10/22  Yes Bill Talley MD   HYDROmorphone (DILAUDID) 8 mg tablet Take 1 Tablet by mouth every four (4) hours as needed for Pain for up to 15 days. Max Daily Amount: 48 mg. 2/3/22 2/18/22 Yes Bill Talley MD   ondansetron (ZOFRAN ODT) 4 mg disintegrating tablet Take 1 Tablet by mouth every eight (8) hours as needed for Nausea or Vomiting. 2/3/22  Yes Bill Talley MD   methadone (DOLOPHINE) 10 mg tablet Take 1 Tablet by mouth every eight (8) hours for 30 days. Max Daily Amount: 30 mg. 1/21/22 2/20/22 Yes Ivana Partida MD   lidocaine (LIDODERM) 5 % 1 Patch by TransDERmal route every twenty-four (24) hours. Apply patch to the affected area for 12 hours a day and remove for 12 hours a day. 1/13/22  Yes Bill Talley MD   ferrous sulfate 325 mg (65 mg iron) tablet Take 325 mg by mouth Daily (before breakfast).    Yes Provider, Historical   dutasteride (AVODART) 0.5 mg capsule TAKE ONE CAPSULE BY MOUTH DAILY 12/13/21  Yes Silas Raya III, DO   tamsulosin (FLOMAX) 0.4 mg capsule TAKE 1 CAPSULE BY MOUTH ONCE EVERY EVENING 12/13/21  Yes Silas Raya III, DO   dexAMETHasone (DECADRON) 4 mg tablet Take 4 mg by mouth daily (with breakfast). 12/3/21  Yes Alyssa Rachel MD   diphenoxylate-atropine (LomotiL) 2.5-0.025 mg per tablet Take 1 Tablet by mouth four (4) times daily as needed for Diarrhea. Max Daily Amount: 4 Tablets. 10/28/21  Yes Flaquita Brown MD   ondansetron (Zofran ODT) 4 mg disintegrating tablet Take 1 Tablet by mouth every eight (8) hours as needed for Nausea. 10/15/21  Yes Ramírez Ely MD   dicyclomine (BENTYL) 10 mg capsule Take 1 Capsule by mouth three (3) times daily as needed for Abdominal Cramps. 10/15/21  Yes Ramírez Ely MD   Creon 36,000-114,000- 180,000 unit cpDR capsule TAKE TWO TO THREE CAPSULES BY MOUTH WITH EACH MEAL AND ONE CAPSULE FOR SNACKS DAILY 10/14/21  Yes Lubertmaría Saybrook, DO   colestipoL (COLESTID) 1 gram tablet  9/27/21  Yes Provider, Historical   cyanocobalamin (VITAMIN B12) 1,000 mcg/mL injection 1 mL by IntraMUSCular route every seven (7) days. 8/12/21  Yes Silas Raya III, DO   calcium carbonate (CALCIUM 500 PO) Take  by mouth. Yes Provider, Historical   azelastine (ASTEPRO) 205.5 mcg (0.15 %) 2 Sprays by Both Nostrils route two (2) times a day. 7/16/21  Yes Garett Santiago MD   vitamin A (AQUASOL A) 10,000 unit capsule TAKE 1 CAPSULE BY MOUTH ONE TIME A DAY 6/16/21  Yes Silas Raya III, DO   OLANZapine (ZyPREXA) 10 mg tablet Take 1 Tab by mouth See Admin Instructions. Take nightly for 4 days starting the evening of chemotherapy. 1/29/21  Yes Clarisa Mccoy MD   lidocaine-prilocaine (EMLA) topical cream Apply  to affected area as needed for Pain. 30-45 min prior to treatments 1/7/21  Yes Clarisa Mccoy MD   naloxone [facility]) 4 mg/actuation nasal spray Use 1 spray intranasally, then discard. Repeat with new spray every 2 min as needed for opioid overdose symptoms, alternating nostrils.  12/11/20  Yes Ciro Sheehan MD   aluminum & magnesium hydroxide-simethicone (Maalox Maximum Strength) 400-400-40 mg/5 mL suspension Take 10 mL by mouth every six (6) hours as needed for Indigestion. Yes Provider, Historical   vitamin E (AQUA GEMS) 400 unit capsule Take 1 Cap by mouth daily. 10/26/20  Yes Silas Raya III,    empagliflozin (Jardiance) 25 mg tablet Take 1 Tab by mouth nightly. 10/20/20  Yes Silas Raya III,    gabapentin (NEURONTIN) 100 mg capsule Take 3 capsules by mouth twice a day. 10/20/20  Yes Silas Raya III, DO   ramipriL (ALTACE) 5 mg capsule Take 1 Cap by mouth daily. Patient taking differently: Take 5 mg by mouth nightly. 10/20/20  Yes Silas Raya III, DO   lidocaine (LIDODERM) 5 % 1 Patch by TransDERmal route every twenty-four (24) hours. Apply patch to the affected area for 12 hours a day and remove for 12 hours a day. 10/15/20  Yes Silas Raya III, DO   finasteride (PROSCAR) 5 mg tablet TAKE 1 TABLET BY MOUTH EVERY DAY 4/13/20  Yes Provider, Historical   acetaminophen (TYLENOL) 325 mg tablet Take 2 Tabs by mouth every four (4) hours as needed for Pain or Fever (Over the counter medication). 1/2/20  Yes Fausto Cole, NP   alpha-D-galactosidase (BEANO PO) Take 1 Tab by mouth two (2) times a day. Yes Other, MD Flynn   cetirizine (ZYRTEC) 10 mg tablet Take 10 mg by mouth nightly.    Yes Provider, Historical          Allergies   Allergen Reactions    Shellfish Derived Anaphylaxis     Soft shell crabs    Morphine Nausea and Vomiting    Pcn [Penicillins] Other (comments)     Pt stated \"always been told PCN\"  Pt tolerated other cephalosporins in the past           Objective:     Visit Vitals  /66   Pulse 88   Temp 98.7 °F (37.1 °C)   Resp 16   Ht 5' 8\" (1.727 m)   Wt 145 lb (65.8 kg)   SpO2 97%   BMI 22.05 kg/m²     Pain Scale: /10      Physical Exam:     GENERAL: alert, cooperative, no distress, appears stated age  EYE: negative  LYMPHATIC: Cervical, supraclavicular, and axillary nodes normal.   THROAT & NECK: normal and no erythema or exudates noted. LUNG: clear to auscultation bilaterally  HEART: irregularly, irregular rythm  ABDOMEN: soft, non-tender  EXTREMITIES: trace ankle edema  SKIN: negative  NEUROLOGIC: Numbness in fingertips and feet - mild    The above physical exam was reviewed and updated as needed on 02/10/22. Lab Results   Component Value Date/Time    WBC 5.1 11/02/2021 11:06 AM    Hemoglobin (POC) 10.3 (L) 10/06/2017 01:14 PM    HGB 12.0 (L) 11/02/2021 11:06 AM    Hematocrit (POC) 28 (L) 05/13/2021 09:16 AM    HCT 37.7 11/02/2021 11:06 AM    PLATELET 57 (L) 05/90/7630 11:06 AM    MCV 91.3 11/02/2021 11:06 AM       Lab Results   Component Value Date/Time    Sodium 136 11/18/2021 03:10 PM    Potassium 3.3 (L) 11/18/2021 03:10 PM    Chloride 105 11/18/2021 03:10 PM    CO2 25 11/18/2021 03:10 PM    Anion gap 6 11/18/2021 03:10 PM    Glucose 260 (H) 11/18/2021 03:10 PM    BUN 10 11/18/2021 03:10 PM    Creatinine 0.83 11/18/2021 03:10 PM    BUN/Creatinine ratio 12 11/18/2021 03:10 PM    GFR est AA >60 11/18/2021 03:10 PM    GFR est non-AA >60 11/18/2021 03:10 PM    Calcium 8.4 (L) 11/18/2021 03:10 PM    Bilirubin, total 0.6 02/10/2022 02:50 PM    Alk. phosphatase 130 (H) 02/10/2022 02:50 PM    Protein, total 5.9 (L) 02/10/2022 02:50 PM    Albumin 2.9 (L) 02/10/2022 02:50 PM    Globulin 3.0 02/10/2022 02:50 PM    A-G Ratio 1.0 (L) 02/10/2022 02:50 PM    ALT (SGPT) 12 02/10/2022 02:50 PM    AST (SGOT) 5 (L) 02/10/2022 02:50 PM             Assessment:     1. 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      Local recurrence in the para-aortic soft tissue - S/P SBRT     Recent CT shows progression of disease in the retroperitoneum, L4, lungs  The disease is unresectable. Treatment will in a palliative intent with a goal to extend life. Receiving palliative chemotherapy   Cis/[place]/Abraxane - s/p 6 cycles - last cycle 5/31/2021    CT - small progressive lung nodules  Too small for NCI/NIH trial  Pain in the L5 vertebrae is worse  Repeat imaging is pending  Refer to Rad-Onc for palliative radiation. 2. Cancer related pain     S/P celiac plexus block - done by Dr. Ambrosio Lentz with significant improvement in the pain  Following with Palliative medicine. Pain meds      3. Bone metastasis, L4/L5    Pain is worse  S/P Ablation/Kyphoplasty - Dr. Ambrosio Lentz  On Denosumab      5. Chemotherapy related neuropathy    Stable  Grade I       6. Atrial fibrillation    Cardiology      Plan:       1. CT and bone scans  2. Follow up in 1 month  3. Refer to Dr. Raven Arana for palliative radiation      Signed by: Alberto Ibrahim MD                     February 10, 2022      CC. Wandy Costello MD  CC. Rayburn Bloch, MD  CC. Rajat Bunn MD  CC. James Hunter MD  CC.  Kelsie Hubbard MD

## 2022-02-10 NOTE — LETTER
2/10/2022    Patient: Danielle Johnson. YOB: 1955   Date of Visit: 2/10/2022     DO Cecil Tierney III. [address]  [facility] [address]  [facility] [address]  [address] [facility] [address]    Dear Precious Bowser DO,      Thank you for referring Mr. Ebenezer Longoria to [address] [place] [address] for evaluation. My notes for this consultation are attached. If you have questions, please do not hesitate to call me. I look forward to following your patient along with you.       Sincerely,    Angelica Mendoza NP

## 2022-02-10 NOTE — PATIENT INSTRUCTIONS
Dalila De Santiago.,     It was a pleasure seeing you today by virtual video visit.     This is the plan we talked about:    -Disease discussion  -We reviewed your most recent CT and bone scan. There is progression of disease in the lung nodules. You are having more pain in your left lower back but this does not correlate with new bony mets in the left side. However, there is left hydronephrosis which is kidney swelling from the retroperitoneal tumor pushing on your renal vein. This could be the cause of your flank pain. Please call your urologist to make an appointment. .     -Chronic low back pain from new L5 met status post ablation and kyphoplasty, new and progressive right hip pain from sacral met  -This pain remains high even after most recent osteocool procedure, you now have pain in the mid back.  -Increase methadone to 20 mg in the morning and noon and 30 mg at bedtime  -You are back on hydromorphone despite it causing you hives because you did not think oxycodone was helpful and it also made you very sleepy. -Continue hydromorphone 8 mg every 3 hours as needed. You take about 3 doses during the day and 1 dose at night. Pain remains suboptimally controlled.  -Add Advil 400 mg with breakfast lunch and dinner  -Apply lidocaine patch and Voltaren gel to your right hip which helps you the most.  -I will review your MRI with an orthopedist to see if a local steroid shot might help.  -You have significant pain in the pelvic and low back region from the cancer mets and severe degenerative osteoarthritis. -Chemo-induced diarrhea  -This is resolved. You are no longer on tincture of opium.    -Sjogren's disease  -You are very careful to stay well hydrated. You drink water and sugar-free gatorade or powerade.     -Multivitamin deficiency  -You were found to have severe vitamin A, vitamin D, vitamin E deficiency. You are on replacement therapy now. You continue to follow with your PCP about this. You had labs drawn in August and have had your vitamin supplementation adjusted due to this.    -Fatigue  -Sometimes you cannot do what you want to do because of the tiredness of your legs. You can get what you need to do done as long as you take breaks.    -Nausea  Your nausea has returned. I provided you with Compazine.    -Follow up  -W I will see you again in 4 weeks       reviewed and appropriate. Most recent CT scan reviewed and results discussed with patient. See results below in data portion of note.

## 2022-02-10 NOTE — PROGRESS NOTES
Palliative Medicine Outpatient Services  Jesus: [phone] ([phone])    Patient Name: Domenico Cline. YOB: 1955    Date of Current Visit: 02/10/22  Location of Current Visit:    [] [facility] Office  [] [facility]  [] [address] [place] [address]  [] Home  [x] Other: Virtual synchronous A/V visit    Date of Initial Visit: 4/6/2020  Referral from: Dr. Analy Martin  Primary Care Physician: Carmen Life, DO      SUMMARY:   Domenico Hidalgo is a 73y.o. 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 Analy Martin for management of symptoms and psychosocial support. 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 This is second marriage for both of them. Current treatment-completed RT to the para-aortic mass, pursuing diagnostics with GI physician Dr. Jeremias Monge for gastroparesis and pancreatic enzymes, has had biliary stents replaced. Status post celiac plexus block and reversal of Whipple procedure on 9/9/2020    Hospitalization at [facility] for uncontrolled pain in December 2020    L5 biopsy, ablation and kyphoplasty on 1/12/2021    Current treatment-on cisplatin plus gemcitabine plus Abraxane, chemotherapy currently on hold  chemotherapy versus functional diarrhea    Status post ostiocool procedure to the right hip     PALLIATIVE DIAGNOSES:       ICD-10-CM ICD-9-CM    1. Cholangiocarcinoma of biliary tract (HCC)  C22.1 155.1    2. Pain from bone metastases (HCC)  G89.3 338.3     C79.51     3.  Functional diarrhea  K59.1 564.5    4. Neoplastic malignant related fatigue  R53.0 780.79    5. Vitamin deficiency  E56.9 269.2           PLAN:   Patient Instructions   Dear Moraima Gaming.,     It was a pleasure seeing you today by virtual video visit.     This is the plan we talked about:    -Disease discussion  -We reviewed your most recent CT and bone scan. There is progression of disease in the lung nodules. You are having more pain in your left lower back but this does not correlate with new bony mets in the left side. However, there is left hydronephrosis which is kidney swelling from the retroperitoneal tumor pushing on your renal vein. This could be the cause of your flank pain. Please call your urologist to make an appointment. .     -Chronic low back pain from new L5 met status post ablation and kyphoplasty, new and progressive right hip pain from sacral met  -This pain remains high even after most recent osteocool procedure, you now have pain in the mid back.  -Increase methadone to 20 mg in the morning and noon and 30 mg at bedtime  -You are back on hydromorphone despite it causing you hives because you did not think oxycodone was helpful and it also made you very sleepy. -Continue hydromorphone 8 mg every 3 hours as needed. You take about 3 doses during the day and 1 dose at night. Pain remains suboptimally controlled.  -Add Advil 400 mg with breakfast lunch and dinner  -Apply lidocaine patch and Voltaren gel to your right hip which helps you the most.  -I will review your MRI with an orthopedist to see if a local steroid shot might help.  -You have significant pain in the pelvic and low back region from the cancer mets and severe degenerative osteoarthritis. -Chemo-induced diarrhea  -This is resolved. You are no longer on tincture of opium.    -Sjogren's disease  -You are very careful to stay well hydrated.  You drink water and sugar-free gatorade or powerade.     -Multivitamin deficiency  -You were found to have severe vitamin A, vitamin D, vitamin E deficiency. You are on replacement therapy now. You continue to follow with your PCP about this. You had labs drawn in August and have had your vitamin supplementation adjusted due to this.    -Fatigue  -Sometimes you cannot do what you want to do because of the tiredness of your legs. You can get what you need to do done as long as you take breaks.    -Nausea  Your nausea has returned. I provided you with Compazine.    -Follow up  -W I will see you again in 4 weeks       reviewed and appropriate. Most recent CT scan reviewed and results discussed with patient. See results below in data portion of note.        GOALS OF CARE / TREATMENT PREFERENCES:   [====Goals of Care====]  GOALS OF CARE:  Patient / health care proxy stated goals: See Patient Instructions / Summary    TREATMENT PREFERENCES:   Code Status:  [x] Attempt Resuscitation       [] Do Not Attempt Resuscitation    Advance Care Planning:  [x] The [employer] Interdisciplinary Team has updated the ACP Navigator with Decision Maker and Patient Capacity      Primary Decision [facility]ley [facility] [phone]    Secondary Decision Maker: Ridge Cristina III - Child - [phone]    Supplemental (Other) Decision Maker: Lukasz Castro Child - [phone]  Advance Care Planning 2/10/2022   Patient's Healthcare Decision Maker is: Named in scanned ACP document   Primary Decision Maker Name -   Primary Decision Maker Phone Number -   Primary Decision Maker Relationship to Patient -   Confirm Advance Directive Yes, on file   Patient Would Like to Complete Advance Directive -   Does the patient have other document types -       Other:  (If patient appropriate for POST, consider using PALLBannerT smart phrase here)    The palliative care team has discussed with patient / health care proxy about goals of care / treatment preferences for patient.  [====Goals of Care====]     PRESCRIPTIONS GIVEN: Medications Ordered Today   Medications    diclofenac (VOLTAREN) 1 % gel     Sig: Apply  to affected area four (4) times daily. Dispense:  1 Each     Refill:  2           FOLLOW UP:     Future Appointments   Date Time Provider Sloane Roach   2/10/2022  3:00 PM NIECY HESS 4 [address] REG   2/10/2022  3:15 PM Indra Santillan NP ONCMR BS AMB   2/21/2022 10:30 AM [facility]   2/21/2022 12:30 PM [facility] 2 [facility] REG   2/21/2022  1:00 PM [facility]   2/21/2022  1:30 PM ED [facility] NM RM 2 [facility] REG           PHYSICIANS INVOLVED IN CARE:   Patient Care Team:  Ayse Lee DO as PCP - General (Internal Medicine)  Ayse Lee DO as PCP - [facility] Empaneled Provider  Isaiah Loco MD (General Surgery)  Fabby Washington MD (Gastroenterology)  Ivy De La O MD (Gastroenterology)  Farrukh Gil MD (Hematology and Oncology)  Jelnea Laureano MD as Palliative Care (Palliative Medicine)  Nima Beltran RN as Benefits Care Manager       HISTORY:   Reviewed patient-completed ESAS and advance care planning form. Reviewed patient record in prescription monitoring program.    CHIEF COMPLAINT:   No chief complaint on file. HPI/SUBJECTIVE:    The patient is: [x] Verbal / [] Nonverbal     Patient complains mid and the pain. This pain remains suboptimally controlled. He is back on hydromorphone. He does feel some improvement with adding and increasing methadone. He is takes several naps during the day, he is most uncomfortable at nighttime when he tries to lay on his right side. -------    Patient here with his wife. Both are very good historians. Mid upper back pain-much improved since radiation to the periaortic mass. He struggled with pain for several months before it was identified as cancer recurrence. He was started on fentanyl 25 mcg patch which he wants to wean off of.     He has made upper and mid zone abdominal pain which comes and goes. He has not noticed any specific pattern to the pain but usually the pain comes on before vomiting or relieves without vomiting. Sometimes, he vomits food that he ate about 12 to 14 hours ago. No constipation. He has 2 liquid bowel movements every day. He is unable to tolerate eggs or honey. He is getting tests done to evaluate his pancreatic enzymes. He has very good support through his wife. Clinical Pain Assessment (nonverbal scale for nonverbal patients):   [++++ Clinical Pain Assessment++++]  [++++Pain Severity++++]: Pain: 8  [++++Pain Character++++]: cramping  [++++Pain Duration++++]: months  [++++Pain Effect++++]: functional   [++++Pain Factors++++]: none in particular  [++++Pain Frequency++++]: on and off  [++++Pain Location++++]: upper abdomen and mid back  [++++ Clinical Pain Assessment++++]       FUNCTIONAL ASSESSMENT:     Palliative Performance Scale (PPS):  PPS: 70       PSYCHOSOCIAL/SPIRITUAL SCREENING:     Any spiritual / Jain concerns:  [] Yes /  [x] No    Caregiver Burnout:  [] Yes /  [x] No /  [] No Caregiver Present      Anticipatory grief assessment:   [x] Normal  / [] Maladaptive       ESAS Anxiety: Anxiety: 0    ESAS Depression: Depression: 0       REVIEW OF SYSTEMS:     The following systems were [x] reviewed / [] unable to be reviewed  Systems: constitutional, ears/nose/mouth/throat, respiratory, gastrointestinal, genitourinary, musculoskeletal, integumentary, neurologic, psychiatric, endocrine. Positive findings noted below. Modified ESAS Completed by: provider   Fatigue: 5 Drowsiness: 10   Depression: 0 Pain: 8   Anxiety: 0 Nausea: 7   Anorexia: 4 Dyspnea: 4   Best Well-Bein Constipation: Yes   Other Problem (Comment): 0          PHYSICAL EXAM:     Wt Readings from Last 3 Encounters:   21 145 lb (65.8 kg)   21 152 lb 9.6 oz (69.2 kg)   21 156 lb 6.4 oz (70.9 kg)     There were no vitals taken for this visit.   Last bowel movement: See Nursing Note    Constitutional    [x] Appears well-developed and well-nourished in no apparent distress    [] Abnormal:  Mental status  [x] Alert and awake  [x] Oriented to person/place/time  [x] Able to follow commands  [] Abnormal:   Eyes  [x] EOM normal   [x] Sclera normal   [x] No visible ocular discharge  [] Abnormal:   HENT  [x] Normocephalic, atraumatic  [x] Mouth/Throat: Moist mucous membranes   [x] External Ears normal  [] Abnormal:  Neck  [x] No visualized mass  [] Abnormal:  Pulmonary/Chest   [x] Respiratory effort normal  [x] No visualized signs of difficulty breathing or respiratory distress  [] Abnormal:  Musculoskeletal  [x] Normal gait with no signs of ataxia  [x] Normal range of motion of neck  [] Abnormal:  Neurological:   [x] No facial asymmetry (Cranial nerve 7 motor function)  [x] No gaze palsy  [] Abnormal:   Skin  [x] No significant exanthematous lesions or discoloration noted on facial skin  [] Abnormal:                                  Psychiatric  [x] Normal affect  [x] No hallucinations  [] Abnormal:    Other pertinent observable physical exam findings:    Due to this being a TeleHealth evaluation, many elements of the physical examination are unable to be assessed. HISTORY:     Past Medical History:   Diagnosis Date    Aneurysm ([facility] 2008    CEREBRAL    Arrhythmia     Previous a.fib, ablation, NSR now; NO LONGER FOLLOWED BY CARDIOLOGIST.     Autoimmune disease ([facility] [address]     SJOGREN'S    Cancer ([facility] [address] 2020    COMMON BILE DUCT, ADENOCARCINOMA, SPINE    Diabetes ([address]     NIDDM    Family history of skin cancer     Hypertension     Sepsis ([facility] [address] 2017    STENTING TO BILE DUCT    Status post chemotherapy     Stroke [facility]) 2008    brain aneurysm - no deficits    Sun-damaged skin     Sunburn, blistering       Past Surgical History:   Procedure Laterality Date    HX CHOLECYSTECTOMY      HX GI      COLONOSCOPY, POLYPS (BENIGN)    HX GI  10/06/2017    Whipple Dr. Trisha Franz  2005    Ablation     HX HERNIA REPAIR  12/2020    HERNIA REPAIR    HX ORTHOPAEDIC  2000    Spinal fusion W/ HARDWARE    HX OTHER SURGICAL  11/07/2017    [place] Cath, Dr. Joycelyn Aviles HX OTHER SURGICAL  01/11/2021    RIGHT IJ PORT-A-CATH PLACEMENT     HX VASCULAR ACCESS  2017    PORTACATH - then removed    IR ABLATE BONE TUMOR PERC W CRYO  12/23/2021    IR KYPHOPLASTY LUMBAR  1/12/2021    NEUROLOGICAL PROCEDURE UNLISTED  2005    [place] hole washout from cerebral hemorrhage    [facility] ABDOMEN SUR[facility] PROC UNLISTED  10/2017    APRIL      Family History   Problem Relation Age of Onset    Cancer Father         Breast and Colon, MELANOMA    Hypertension Father     Cancer Mother         LEUKEMIA    No Known Problems Sister     Atrial Fibrillation Brother     No Known Problems Sister     No Known Problems Son     No Known Problems Son     Anesth Problems Neg Hx       History reviewed, no pertinent family history. Social History     Tobacco Use    Smoking status: Never Smoker    Smokeless tobacco: Never Used   Substance Use Topics    Alcohol use: Never     Comment: very rare     Allergies   Allergen Reactions    Shellfish Derived Anaphylaxis     Soft shell crabs    Morphine Nausea and Vomiting    Pcn [Penicillins] Other (comments)     Pt stated \"always been told PCN\"  Pt tolerated other cephalosporins in the past      Current Outpatient Medications   Medication Sig    diclofenac (VOLTAREN) 1 % gel Apply  to affected area four (4) times daily.  HYDROmorphone (DILAUDID) 8 mg tablet Take 1 Tablet by mouth every four (4) hours as needed for Pain for up to 15 days. Max Daily Amount: 48 mg.    ondansetron (ZOFRAN ODT) 4 mg disintegrating tablet Take 1 Tablet by mouth every eight (8) hours as needed for Nausea or Vomiting.  methadone (DOLOPHINE) 10 mg tablet Take 1 Tablet by mouth every eight (8) hours for 30 days.  Max Daily Amount: 30 mg.    lidocaine (LIDODERM) 5 % 1 Patch by TransDERmal route every twenty-four (24) hours. Apply patch to the affected area for 12 hours a day and remove for 12 hours a day.  ferrous sulfate 325 mg (65 mg iron) tablet Take 325 mg by mouth Daily (before breakfast).  dutasteride (AVODART) 0.5 mg capsule TAKE ONE CAPSULE BY MOUTH DAILY    tamsulosin (FLOMAX) 0.4 mg capsule TAKE 1 CAPSULE BY MOUTH ONCE EVERY EVENING    dexAMETHasone (DECADRON) 4 mg tablet Take 4 mg by mouth daily (with breakfast).  diphenoxylate-atropine (LomotiL) 2.5-0.025 mg per tablet Take 1 Tablet by mouth four (4) times daily as needed for Diarrhea. Max Daily Amount: 4 Tablets.  ondansetron (Zofran ODT) 4 mg disintegrating tablet Take 1 Tablet by mouth every eight (8) hours as needed for Nausea.  dicyclomine (BENTYL) 10 mg capsule Take 1 Capsule by mouth three (3) times daily as needed for Abdominal Cramps.  Creon 36,000-114,000- 180,000 unit cpDR capsule TAKE TWO TO THREE CAPSULES BY MOUTH WITH EACH MEAL AND ONE CAPSULE FOR SNACKS DAILY    colestipoL (COLESTID) 1 gram tablet     cyanocobalamin (VITAMIN B12) 1,000 mcg/mL injection 1 mL by IntraMUSCular route every seven (7) days.  calcium carbonate (CALCIUM 500 PO) Take  by mouth.  azelastine (ASTEPRO) 205.5 mcg (0.15 %) 2 Sprays by Both Nostrils route two (2) times a day.  vitamin A (AQUASOL A) 10,000 unit capsule TAKE 1 CAPSULE BY MOUTH ONE TIME A DAY    OLANZapine (ZyPREXA) 10 mg tablet Take 1 Tab by mouth See Admin Instructions. Take nightly for 4 days starting the evening of chemotherapy.  lidocaine-prilocaine (EMLA) topical cream Apply  to affected area as needed for Pain. 30-45 min prior to treatments    naloxone [facility]) 4 mg/actuation nasal spray Use 1 spray intranasally, then discard. Repeat with new spray every 2 min as needed for opioid overdose symptoms, alternating nostrils.     aluminum & magnesium hydroxide-simethicone (Maalox Maximum Strength) 400-400-40 mg/5 mL suspension Take 10 mL by mouth every six (6) hours as needed for Indigestion.  vitamin E (AQUA GEMS) 400 unit capsule Take 1 Cap by mouth daily.  empagliflozin (Jardiance) 25 mg tablet Take 1 Tab by mouth nightly.  gabapentin (NEURONTIN) 100 mg capsule Take 3 capsules by mouth twice a day.  ramipriL (ALTACE) 5 mg capsule Take 1 Cap by mouth daily. (Patient taking differently: Take 5 mg by mouth nightly.)    lidocaine (LIDODERM) 5 % 1 Patch by TransDERmal route every twenty-four (24) hours. Apply patch to the affected area for 12 hours a day and remove for 12 hours a day.  finasteride (PROSCAR) 5 mg tablet TAKE 1 TABLET BY MOUTH EVERY DAY    acetaminophen (TYLENOL) 325 mg tablet Take 2 Tabs by mouth every four (4) hours as needed for Pain or Fever (Over the counter medication).  alpha-D-galactosidase (BEANO PO) Take 1 Tab by mouth two (2) times a day.  cetirizine (ZYRTEC) 10 mg tablet Take 10 mg by mouth nightly.  azithromycin (ZITHROMAX) 250 mg tablet  (Patient not taking: Reported on 12/21/2021)    pantoprazole (PROTONIX) 40 mg tablet TAKE 1 TABLET BY MOUTH EVERY DAY (Patient not taking: Reported on 12/21/2021)    loperamide (IMODIUM) 2 mg capsule Take 2-4 mg by mouth as needed for Diarrhea. Give 4 mg after first loose stool. Give an additional 2 mg after each loose stool as needed up to a maximum of 8 doses (16 mg/day). (Patient not taking: Reported on 12/21/2021)     No current facility-administered medications for this visit.           LAB DATA REVIEWED:     Lab Results   Component Value Date/Time    WBC 5.1 11/02/2021 11:06 AM    HGB 12.0 (L) 11/02/2021 11:06 AM    PLATELET 57 (L) 92/85/8598 11:06 AM     Lab Results   Component Value Date/Time    Sodium 136 11/18/2021 03:10 PM    Potassium 3.3 (L) 11/18/2021 03:10 PM    Chloride 105 11/18/2021 03:10 PM    CO2 25 11/18/2021 03:10 PM    BUN 10 11/18/2021 03:10 PM    Creatinine 0.83 11/18/2021 03:10 PM Calcium 8.4 (L) 11/18/2021 03:10 PM    Magnesium 2.3 11/18/2021 03:10 PM    Phosphorus 2.2 (L) 11/18/2021 03:10 PM      Lab Results   Component Value Date/Time    Alk. phosphatase 176 (H) 11/18/2021 03:10 PM    Protein, total 6.0 (L) 11/18/2021 03:10 PM    Albumin 3.3 (L) 11/18/2021 03:10 PM    Globulin 2.7 11/18/2021 03:10 PM     Lab Results   Component Value Date/Time    INR 1.1 09/09/2020 02:15 AM    Prothrombin time 11.5 (H) 09/09/2020 02:15 AM    aPTT 29.9 09/09/2020 02:15 AM      Lab Results   Component Value Date/Time    Iron 31 (L) 01/02/2020 03:24 AM    TIBC 245 (L) 01/02/2020 03:24 AM    Iron % saturation 13 (L) 01/02/2020 03:24 AM    Ferritin 122 01/02/2020 03:24 AM     MRI- Dec 2021     IMPRESSION  1. Osseous metastases in the right hemisacrum and right posterosuperior  acetabulum. 2.  Thin-walled rim-enhancing fluid signal focus in the right inguinal region,  indeterminate, but differential considerations include necrotic metastatic lymph  node and fluid collection (e.g., seroma, abscess). Correlate clinically. 3.  Patchy edema signal and enhancement within and surrounding the left anterior  inferior iliac spine and rectus femoris origin, may reflect enthesopathic change  and/or age indeterminate rectus femoris avulsion injury. 4.  Bilateral hip joint osteoarthritis with (likely) degenerative superior  acetabular labral tears.         CT- Nov 2021  IMPRESSION  1. Progression of lung metastases. 2. Stable retroperitoneal tumor causing vascular occlusions/stenoses and left  UPJ obstruction. 3. New sclerotic bone focus/metastasis. Chest CT- aug 2021       IMPRESSION  1. Multiple pulmonary nodules some of which have increased in size which may  represent progression of disease. Several new nodules are noted.     2. Nonspecific foci of enhancement in the right lobe the liver. These may  represent focal areas of inflammation or hyperemia given the  hepaticojejunostomy.  Attention on follow-up imaging     3. Status post Whipple procedure.     4.  Stable periaortic soft tissue density.     5.  Stable perinephric stranding and fluid around the left kidney hilum       CT chest, abdomen and pelvis-May 2021  IMPRESSION     1. Unchanged appearance of the chest, abdomen, and pelvis when compared to  3/1/2021. No acute findings or evidence of disease progression. 2. Unchanged small pulmonary nodules. Unchanged retroperitoneal soft tissue mass  with left renal vein occlusion and collateral retroperitoneal venous structures.       CT abd 3/1/21  IMPRESSION  1. No significant change in the appearance of pulmonary nodules. 2. No significant change in the appearance of retroperitoneal adenopathy/mass. There is mass effect upon the vasculature, including occlusion of the left renal  vein with collateral vessels in the retroperitoneum, likely unchanged. 3. Incidental findings as above. CT chest 12/29/2020  IMPRESSION:     1. Numerous tiny pulmonary parenchymal nodules right greater than left,  suspicious for metastatic disease. 2. Focal ill-defined opacity in the right upper lobe which may represent  infiltrate. Follow-up recommended, however. 3. Incidental findings in the upper abdomen described earlier same day.      CONTROLLED SUBSTANCES SAFETY ASSESSMENT (IF ON CONTROLLED SUBSTANCES):     Reviewed opioid safety handout:  [x] Yes   [] No  24 hour opioid dose >150mg morphine equivalent/day:  [] Yes   [x] No  Benzodiazepines:  [] Yes   [x] No  Sleep apnea:  [] Yes   [x] No  Urine Toxicology Testing within last 6 months:  [] Yes   [x] No  History of or new aberrant medication taking behaviors:  [] Yes   [x] No  Has Narcan been prescribed [x] Yes   [] No          Total time: 45 minutes   Counseling / coordination time: 40  > 50% counseling / coordination?:   Consent:  He and/or health care decision maker is aware that that he may receive a bill for this telehealth service, depending on his insurance coverage, and has provided verbal consent to proceed: Yes    CPT Codes 58850-58412 for Established Patients may apply to this Telehealth Visit  Pursuant to the emergency declaration under the [address] [place] [address] waiver authority and the [employer] and [employer] Act, this Virtual  Visit was conducted, with patient's consent, to reduce the patient's risk of exposure to COVID-19 and provide continuity of care for an established patient. Services were provided through a video synchronous discussion virtually to substitute for in-person clinic visit.

## 2022-02-10 NOTE — PROGRESS NOTES
Palliative Medicine Office Visit  Palliative Medicine Nurse Check In  [phone]    Patient Name: Sagar Staples. YOB: 1955      Date of Office Visit: 2/10/2022    Patient states: \"  \"    From Check In Sheet (scanned in Media):  Has a medical provider talked with you about cardiopulmonary resuscitation (CPR)? [x] Yes   [] No   [] Unable to obtain    Nurse reminder to complete or update ACP FlowSheet:    Is ACP on the Problem List?    [x] Yes    [] No  IF ACP Document is ON FILE; Nurse to place ACP on Problem List     Is there an ACP Note in Chart Review/Note? [x] Yes    [] No   If NO: ALERT PROVIDER       Primary Decision MakerBelyndromulo Sind - [phone]    Secondary Decision Maker: Ridge Cristina III - Child - [phone]    Supplemental (Other) Decision Maker: Kylie Segura Child - [phone]  Advance Care Planning 2/10/2022   Patient's [address] is: Named in scanned ACP document   Primary Decision Maker Name -   Primary Decision Maker Phone Number -   Primary Decision Maker Relationship to Patient -   Confirm Advance Directive Yes, on file   Patient Would Like to Complete Advance Directive -   Does the patient have other document types -         Is there anything that we should know about you as a person in order to provide you the best care possible? Have you been to the ER, urgent care clinic since your last visit? [] Yes   [x] No   [] Unable to obtain    Have you been hospitalized since your last visit? [] Yes   [x] No   [] Unable to obtain    Have you seen or consulted any other health care providers outside of the [address] [place] [address] since your last visit?    [] Yes   [x] No   [] Unable to obtain    Functional status (describe):         Last BM: 2/10/2022     accessed (date): 2/10/2022    Bottle review (for opioid pain medication):  Medication 1:   Date filled:   Directions:   # filled:   # left:   # pills taking per day:  Last dose taken:    Medication 2:   Date filled:   Directions:   # filled:   # left:   # pills taking per day:  Last dose taken:    Medication 3:   Date filled:   Directions:   # filled:   # left:   # pills taking per day:  Last dose taken:    Medication 4:   Date filled:   Directions:   # filled:   # left:   # pills taking per day:  Last dose taken:

## 2022-02-16 NOTE — TELEPHONE ENCOUNTER
From: Ezekiel Dasilva. To: Maxi Tsai III, DO  Sent: 2/15/2022 6:53 PM EST  Subject: Britta Hardin needs a refill for Jardiance. He has a new insurance and the refills can be sent to the [address] [place] [address], [address] [place] [address] [phone]. Thank you.   Melva Boggs and Zoey Skelton

## 2022-02-16 NOTE — TELEPHONE ENCOUNTER
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 -Appointment not scheduled prior to need for scheduled refill      Pharmacy: [facility] [address] [place] [address]    Medication:  HYDROmorphone (DILAUDID) 8 mg        Dose and directions: Take 1 Tablet by mouth every four (4) hours as needed  Number dispensed:90  Date filled ( or Pharmacy):2/3/2022  # left:    Medication:methadone (DOLOPHINE) 10 mg       Dose and directions: Take 1 Tablet by mouth every eight (8) hours  Number dispensed:90  Date filled ( or Pharmacy):1/21/2022  #left:     reviewed: _please refill     Date of Urine Drug Screen:  _n/a    Opioid Safety Handout given:  _yes    Appropriate for refill:  _yes    Action:  _ please refill

## 2022-02-16 NOTE — TELEPHONE ENCOUNTER
----- Message from [place]. Elizabeth Levi. sent at 2/15/2022  6:55 PM EST -----  Regarding: Ayo Baugh Send Prescriptions  to the [address] [place] [address], [address] [phone]. He needs Hydromorphone and Methadone. Thank you for your support.   Hubert Kincaid and Pam Molina

## 2022-02-16 NOTE — TELEPHONE ENCOUNTER
PCP: Carmen Carnes DO    Last appt: 11/10/2021  Future Appointments   Date Time Provider Sloane Marley   2/21/2022 10:30 AM [facility] 1 [facility]   2/21/2022 12:30 PM [facility] 2 [facility]   2/21[facility]2022  1:00 [facility]   2/21/2022  1:30 PM [facility]   2/24/2022  9:30 AM Gabby Hoff MD ONC BS AMB   3/10/2022  1:30 PM Freya Yao MD [address] [facility] BS AMB   3/10/2022  3:00 PM [facility] 4 [facility] REG   4/7/2022  3:00 PM [facility] FA[facility] 2 [facility] REG   5/5/2022  3:00 PM [facility] FASTR[facility] 4 [facility]       Requested Prescriptions     Pending Prescriptions Disp Refills    empagliflozin (Jardiance) 25 mg tablet 90 Tablet 3     Sig: Take 1 Tablet by mouth nightly.        Prior labs and Blood pressures:  BP Readings from Last 3 Encounters:   02/10/22 111/66   02/10/22 111/66   12/23/21 111/66     Lab Results   Component Value Date/Time    Sodium 136 11/18/2021 03:10 PM    Potassium 3.3 (L) 11/18/2021 03:10 PM    Chloride 105 11/18/2021 03:10 PM    CO2 25 11/18/2021 03:10 PM    Anion gap 6 11/18/2021 03:10 PM    Glucose 260 (H) 11/18/2021 03:10 PM    BUN 10 11/18/2021 03:10 PM    Creatinine 0.83 11/18/2021 03:10 PM    BUN/Creatinine ratio 12 11/18/2021 03:10 PM    GFR est AA >60 11/18/2021 03:10 PM    GFR est non-AA >60 11/18/2021 03:10 PM    Calcium 8.4 (L) 11/18/2021 03:10 PM     Lab Results   Component Value Date/Time    Hemoglobin A1c 6.0 (H) 08/06/2021 12:13 PM    Hemoglobin A1c (POC) 6.4 09/03/2019 03:50 AM     Lab Results   Component Value Date/Time    Cholesterol, total 82 08/06/2021 12:13 PM    HDL Cholesterol 44 08/06/2021 12:13 PM    LDL, calculated 24.6 08/06/2021 12:13 PM    VLDL, calculated 13.4 08/06/2021 12:13 PM    Triglyceride 67 08/06/2021 12:13 PM    CHOL/HDL Ratio 1.9 08/06/2021 12:13 PM     Lab Results   Component Value Date/Time    Vitamin D 25-Hydroxy 32.9 09/04/2020 03:53 AM       Lab Results Component Value Date/Time    TSH 1.05 08/06/2021 12:13 PM

## 2022-02-18 NOTE — ED PROVIDER NOTES
EMERGENCY DEPARTMENT HISTORY AND PHYSICAL EXAM      Date: 2/18/2022  Patient Name: Laura Dominique. History of Presenting Illness     Chief Complaint   Patient presents with    Peripheral Edema     legs are swollen from scrotum down; started as ankles swollen a couple of days ago. worse last night. having some short of breath and cough onset last night as well. pt has had some swelling since June chemo    Shortness of Breath       History Provided By: Patient    HPI: Laura Dominique., 73 y.o. male presents to the ED with cc of peripheral edema and shortness of breath. The patient has a history of bile duct carcinoma. He says his last chemo was in June. He has had some swelling in his ankles since then, but now has swelling of to the scrotum. He said that started a few days ago. Also been short of breath for the last few days. He denies chest pain, but states he has a cough which is productive of clear sputum. He is fully vaccinated against COVID-19, but has not gotten a booster yet. He denies leg pain, fever or chills. Denies nausea, abdominal pain or dizziness. States he has never had swelling like this in the past.    There are no other complaints, changes, or physical findings at this time. PCP: Terrance Trevizo, DO    No current facility-administered medications on file prior to encounter. Current Outpatient Medications on File Prior to Encounter   Medication Sig Dispense Refill    empagliflozin (Jardiance) 25 mg tablet Take 1 Tablet by mouth nightly. 90 Tablet 3    HYDROmorphone (DILAUDID) 8 mg tablet Take 1 Tablet by mouth every three (3) hours as needed for Pain for up to 15 days. Max Daily Amount: 64 mg. 120 Tablet 0    methadone (DOLOPHINE) 10 mg tablet Take 2 Tablets by mouth two (2) times a day AND 3 Tablets nightly. Do all this for 30 days.  Max Daily Amount: 70 mg. 210 Tablet 0    [START ON 3/3/2022] HYDROmorphone (DILAUDID) 8 mg tablet Take 1 Tablet by mouth every three (3) hours as needed for Pain for up to 15 days. Max Daily Amount: 64 mg. 120 Tablet 0    diclofenac (VOLTAREN) 1 % gel Apply  to affected area four (4) times daily. 1 Each 2    ondansetron (ZOFRAN ODT) 4 mg disintegrating tablet Take 1 Tablet by mouth every eight (8) hours as needed for Nausea or Vomiting. 30 Tablet 0    lidocaine (LIDODERM) 5 % 1 Patch by TransDERmal route every twenty-four (24) hours. Apply patch to the affected area for 12 hours a day and remove for 12 hours a day. 30 Each 1    ferrous sulfate 325 mg (65 mg iron) tablet Take 325 mg by mouth Daily (before breakfast).  dutasteride (AVODART) 0.5 mg capsule TAKE ONE CAPSULE BY MOUTH DAILY 90 Capsule 3    tamsulosin (FLOMAX) 0.4 mg capsule TAKE 1 CAPSULE BY MOUTH ONCE EVERY EVENING 90 Capsule 3    dexAMETHasone (DECADRON) 4 mg tablet Take 4 mg by mouth daily (with breakfast). 5 Tablet 0    diphenoxylate-atropine (LomotiL) 2.5-0.025 mg per tablet Take 1 Tablet by mouth four (4) times daily as needed for Diarrhea. Max Daily Amount: 4 Tablets. 60 Tablet 0    ondansetron (Zofran ODT) 4 mg disintegrating tablet Take 1 Tablet by mouth every eight (8) hours as needed for Nausea. 10 Tablet 0    dicyclomine (BENTYL) 10 mg capsule Take 1 Capsule by mouth three (3) times daily as needed for Abdominal Cramps. 10 Capsule 0    Creon 36,000-114,000- 180,000 unit cpDR capsule TAKE TWO TO THREE CAPSULES BY MOUTH WITH EACH MEAL AND ONE CAPSULE FOR SNACKS DAILY 1080 Capsule 3    colestipoL (COLESTID) 1 gram tablet       cyanocobalamin (VITAMIN B12) 1,000 mcg/mL injection 1 mL by IntraMUSCular route every seven (7) days. 6 mL 6    calcium carbonate (CALCIUM 500 PO) Take  by mouth.  azelastine (ASTEPRO) 205.5 mcg (0.15 %) 2 Sprays by Both Nostrils route two (2) times a day.  1 Bottle 0    vitamin A (AQUASOL A) 10,000 unit capsule TAKE 1 CAPSULE BY MOUTH ONE TIME A DAY 90 Capsule 3    OLANZapine (ZyPREXA) 10 mg tablet Take 1 Tab by mouth See Admin Instructions. Take nightly for 4 days starting the evening of chemotherapy. 20 Tab 1    lidocaine-prilocaine (EMLA) topical cream Apply  to affected area as needed for Pain. 30-45 min prior to treatments 30 g 3    naloxone (NARCAN) 4 mg/actuation nasal spray Use 1 spray intranasally, then discard. Repeat with new spray every 2 min as needed for opioid overdose symptoms, alternating nostrils. 1 Each 0    aluminum & magnesium hydroxide-simethicone (Maalox Maximum Strength) 400-400-40 mg/5 mL suspension Take 10 mL by mouth every six (6) hours as needed for Indigestion.  vitamin E (AQUA GEMS) 400 unit capsule Take 1 Cap by mouth daily. 90 Cap 3    gabapentin (NEURONTIN) 100 mg capsule Take 3 capsules by mouth twice a day. 540 Cap 3    ramipriL (ALTACE) 5 mg capsule Take 1 Cap by mouth daily. (Patient taking differently: Take 5 mg by mouth nightly.) 90 Cap 3    lidocaine (LIDODERM) 5 % 1 Patch by TransDERmal route every twenty-four (24) hours. Apply patch to the affected area for 12 hours a day and remove for 12 hours a day. 30 Each 5    finasteride (PROSCAR) 5 mg tablet TAKE 1 TABLET BY MOUTH EVERY DAY      acetaminophen (TYLENOL) 325 mg tablet Take 2 Tabs by mouth every four (4) hours as needed for Pain or Fever (Over the counter medication). 1 Tab 0    alpha-D-galactosidase (BEANO PO) Take 1 Tab by mouth two (2) times a day.  cetirizine (ZYRTEC) 10 mg tablet Take 10 mg by mouth nightly. Past History     Past Medical History:  Past Medical History:   Diagnosis Date    Aneurysm ([address] 2008    CEREBRAL    Arrhythmia     Previous a.fib, ablation, NSR now; NO LONGER FOLLOWED BY CARDIOLOGIST.     Autoimmune disease ([address]     SJOGREN'S    Cancer ([address] 2020    COMMON BILE DUCT, ADENOCARCINOMA, SPINE    Diabetes ([address]     NIDDM    Family history of skin cancer     Hypertension     Sepsis ([address] 2017    STENTING TO BILE DUCT    Status post chemotherapy     Stroke [facility]) 2008    brain aneurysm - no deficits    Sun-damaged skin     Sunburn, blistering        Past Surgical History:  Past Surgical History:   Procedure Laterality Date    HX CHOLECYSTECTOMY      HX GI      COLONOSCOPY, POLYPS (BENIGN)    HX GI  10/06/2017    [facility]     HX GI  2020    WHIPP REVISION    HX HEART CATHETERIZATION  2005    Ablation     HX HERNIA REPAIR  12/2020    HERNIA REPAIR    HX ORTHOPAEDIC  2000    Spinal fusion W/ HARDWARE    HX OTHER SURGICAL  11/07/2017    [place] Cath, Dr. Jm Mo HX OTHER SURGICAL  01/11/2021    RIGHT IJ PORT-A-CATH PLACEMENT     HX VASCULAR ACCESS  2017    PORTACATH - then removed    IR ABLATE BONE TUMOR PERC W CRYO  12/23/2021    IR KYPHOPLASTY LUMBAR  1/12/2021    NEUROLOGICAL PROCEDURE UNLISTED  2005    Ting hole washout from cerebral hemorrhage    NE ABDOMEN SURGERY PROC UNLISTED  10/2017    APRIL       Family History:  Family History   Problem Relation Age of Onset    Cancer Father         Breast and Colon, MELANOMA    Hypertension Father     Cancer Mother         LEUKEMIA    No Known Problems Sister     Atrial Fibrillation Brother     No Known Problems Sister     No Known Problems Son     No Known Problems Son     Anesth Problems Neg Hx        Social History:  Social History     Tobacco Use    Smoking status: Never Smoker    Smokeless tobacco: Never Used   Vaping Use    Vaping Use: Never used   Substance Use Topics    Alcohol use: Never     Comment: very rare    Drug use: No       Allergies: Allergies   Allergen Reactions    Shellfish Derived Anaphylaxis     Soft shell crabs    Morphine Nausea and Vomiting    Pcn [Penicillins] Other (comments)     Pt stated \"always been told PCN\"  Pt tolerated other cephalosporins in the past         Review of Systems   Review of Systems   Constitutional: Negative for fever. HENT: Negative for congestion. Eyes: Negative. Respiratory: Positive for cough and shortness of breath. Cardiovascular: Positive for leg swelling. Negative for chest pain. Gastrointestinal: Negative for abdominal pain. Endocrine: Negative for heat intolerance. Genitourinary: Negative for dysuria. Musculoskeletal: Negative for back pain. Skin: Negative for rash. Allergic/Immunologic: Positive for immunocompromised state. Neurological: Negative for light-headedness. Hematological: Does not bruise/bleed easily. Psychiatric/Behavioral: Negative. All other systems reviewed and are negative. Physical Exam   Physical Exam  Vitals and nursing note reviewed. Constitutional:       General: He is not in acute distress. Appearance: He is well-developed. HENT:      Head: Normocephalic and atraumatic. Cardiovascular:      Rate and Rhythm: Normal rate and regular rhythm. Heart sounds: Normal heart sounds. Pulmonary:      Effort: Pulmonary effort is normal.      Breath sounds: Normal breath sounds. Abdominal:      General: Bowel sounds are normal.      Palpations: Abdomen is soft. Musculoskeletal:      Cervical back: Normal range of motion. Right lower leg: Edema present. Left lower leg: Edema present. Comments: 1+ bilateral lower extremity edema   Skin:     General: Skin is warm and dry. Neurological:      General: No focal deficit present. Mental Status: He is alert and oriented to person, place, and time.       Coordination: Coordination normal.   Psychiatric:         Mood and Affect: Mood normal.         Behavior: Behavior normal.         Diagnostic Study Results     Labs -     Recent Results (from the past 12 hour(s))   EKG, 12 LEAD, INITIAL    Collection Time: 02/18/22  9:58 AM   Result Value Ref Range    Ventricular Rate 63 BPM    Atrial Rate 63 BPM    P-R Interval 156 ms    QRS Duration 100 ms    Q-T Interval 444 ms    QTC Calculation (Bezet) 454 ms    Calculated P Axis 96 degrees    Calculated R Axis 5 degrees    Calculated T Axis 35 degrees    Diagnosis Normal sinus rhythm  Normal ECG  When compared with ECG of 24-SEP-2021 10:56,  No significant change was found     CBC WITH AUTOMATED DIFF    Collection Time: 02/18/22 10:00 AM   Result Value Ref Range    WBC 4.2 4.1 - 11.1 K/uL    RBC 3.25 (L) 4.10 - 5.70 M/uL    HGB 9.0 (L) 12.1 - 17.0 g/dL    HCT 28.9 (L) 36.6 - 50.3 %    MCV 88.9 80.0 - 99.0 FL    MCH 27.7 26.0 - 34.0 PG    MCHC 31.1 30.0 - 36.5 g/dL    RDW 15.3 (H) 11.5 - 14.5 %    PLATELET 80 (L) 524 - 400 K/uL    MPV 10.7 8.9 - 12.9 FL    NRBC 0.0 0  WBC    ABSOLUTE NRBC 0.00 0.00 - 0.01 K/uL    NEUTROPHILS 68 32 - 75 %    LYMPHOCYTES 13 12 - 49 %    MONOCYTES 13 5 - 13 %    EOSINOPHILS 4 0 - 7 %    BASOPHILS 1 0 - 1 %    IMMATURE GRANULOCYTES 1 (H) 0.0 - 0.5 %    ABS. NEUTROPHILS 3.0 1.8 - 8.0 K/UL    ABS. LYMPHOCYTES 0.5 (L) 0.8 - 3.5 K/UL    ABS. MONOCYTES 0.5 0.0 - 1.0 K/UL    ABS. EOSINOPHILS 0.2 0.0 - 0.4 K/UL    ABS. BASOPHILS 0.0 0.0 - 0.1 K/UL    ABS. IMM. GRANS. 0.0 0.00 - 0.04 K/UL    DF SMEAR SCANNED      RBC COMMENTS ANISOCYTOSIS  1+       METABOLIC PANEL, COMPREHENSIVE    Collection Time: 02/18/22 10:00 AM   Result Value Ref Range    Sodium 135 (L) 136 - 145 mmol/L    Potassium 4.0 3.5 - 5.1 mmol/L    Chloride 105 97 - 108 mmol/L    CO2 25 21 - 32 mmol/L    Anion gap 5 5 - 15 mmol/L    Glucose 228 (H) 65 - 100 mg/dL    BUN 17 6 - 20 MG/DL    Creatinine 1.14 0.70 - 1.30 MG/DL    BUN/Creatinine ratio 15 12 - 20      GFR est AA >60 >60 ml/min/1.73m2    GFR est non-AA >60 >60 ml/min/1.73m2    Calcium 7.8 (L) 8.5 - 10.1 MG/DL    Bilirubin, total 0.8 0.2 - 1.0 MG/DL    ALT (SGPT) 14 12 - 78 U/L    AST (SGOT) 7 (L) 15 - 37 U/L    Alk.  phosphatase 125 (H) 45 - 117 U/L    Protein, total 5.2 (L) 6.4 - 8.2 g/dL    Albumin 2.7 (L) 3.5 - 5.0 g/dL    Globulin 2.5 2.0 - 4.0 g/dL    A-G Ratio 1.1 1.1 - 2.2     TROPONIN-HIGH SENSITIVITY    Collection Time: 02/18/22 10:00 AM   Result Value Ref Range    Troponin-High Sensitivity 10 0 - 76 ng/L   NT-PRO BNP    Collection Time: 02/18/22 10:00 AM   Result Value Ref Range    NT pro-BNP 1,386 (H) <125 PG/ML   TROPONIN-HIGH SENSITIVITY    Collection Time: 02/18/22 11:51 AM   Result Value Ref Range    Troponin-High Sensitivity 9 0 - 76 ng/L   D DIMER    Collection Time: 02/18/22 11:51 AM   Result Value Ref Range    D-dimer 1.13 (H) 0.00 - 0.65 mg/L FEU       Radiologic Studies -   XR CHEST PA LAT   Final Result   Increased size of numerous pulmonary metastases. CT Results  (Last 48 hours)               02/18/22 1259  CTA CHEST W OR W WO CONT Final result    Impression:  1. Interval increase in size and extent of innumerable pulmonary metastases. 2. Thickened bronchial walls in the left upper and lower lobes compatible with   infection or inflammation of the airways. 3. Splenomegaly. 4. Small bilateral pleural effusions. 5. No pulmonary embolism. 6. New T10 vertebral body sclerotic change concerning for osseous metastatic   disease. Narrative:  EXAM:  CTA CHEST W OR W WO CONT       INDICATION: Shortness of breath, elevated d-dimer       COMPARISON: CT chest abdomen pelvis November 12, 2021       TECHNIQUE: Helical thin section chest CT following uneventful intravenous   administration of nonionic contrast 80 mL of isovue 370 according to   departmental PE protocol. Coronal and sagittal reformats were performed. 3D post   processing was performed. CT dose reduction was achieved through the use of a   standardized protocol tailored for this examination and automatic exposure   control for dose modulation. FINDINGS: This is a good quality study for the evaluation of pulmonary embolism   to the first subsegmental arterial level. There is no pulmonary embolism to this   level. THYROID: No nodule. MEDIASTINUM: No mass or lymphadenopathy. ANGEL: No mass or lymphadenopathy. THORACIC AORTA: No aneurysm. HEART: Normal in size. ESOPHAGUS: No wall thickening or dilatation. TRACHEA/BRONCHI: Thickened bronchial walls, particularly in the left upper and   lower lobes. Estil Grayson PLEURA: Small bilateral pleural effusions. LUNGS: Innumerable pulmonary nodules involving all lobes, more extensive and   increased in size compared to November 12, 2021. UPPER ABDOMEN: Partially imaged. No acute pathology. Redemonstrated pneumobilia   which is likely postoperative in nature. Splenomegaly. BONES: Focus of sclerosis in the T10 vertebral body is new compared to November 12, 2021. CXR Results  (Last 48 hours)               02/18/22 1011  XR CHEST PA LAT Final result    Impression:  Increased size of numerous pulmonary metastases. Narrative:  INDICATION: Shortness of Breath       EXAM: CXR 2 Views. COMPARISON: CT Chest Abdomen Pelvis 11/12/2021. FINDINGS: Frontal and lateral views of the chest show increase in size of the   numerous pulmonary pulmonary metastases. There is no focal consolidation. Heart size is normal. There is no pulmonary edema. There is no evident   pneumothorax or pleural effusion. Port catheter remains. Medical Decision Making   I am the first provider for this patient. I reviewed the vital signs, available nursing notes, past medical history, past surgical history, family history and social history. Vital Signs-Reviewed the patient's vital signs. Patient Vitals for the past 12 hrs:   Temp Pulse Resp BP SpO2   02/18/22 0948 97.7 °F (36.5 °C) 69 18 119/71 100 %       EKG interpretation: (Preliminary)  Rhythm: normal sinus rhythm; and regular .  Rate (approx.): 63; Axis: normal; IL interval: normal; QRS interval: normal ; ST/T wave: normal; Other findings: normal.    Records Reviewed: Nursing Notes, Old Medical Records and Previous electrocardiograms    Provider Notes (Medical Decision Making):   Peripheral  edema, CHF, DVT, pneumonia, bronchitis, ACS, pulmonary embolus    ED Course:   Initial assessment performed. The patients presenting problems have been discussed, and they are in agreement with the care plan formulated and outlined with them. I have encouraged them to ask questions as they arise throughout their visit. Progress note:    Patient was ambulated for pulse ox. He maintained saturation of 90% on room air. His results of been reviewed. He is advised to follow-up and return to ER if worse               Critical Care Time:   0    Disposition:  home    DISCHARGE PLAN:  1. Discharge Medication List as of 2/18/2022  4:30 PM      START taking these medications    Details   furosemide (Lasix) 20 mg tablet Take 1 Tablet by mouth daily. , Normal, Disp-2 Tablet, R-0      doxycycline (VIBRA-TABS) 100 mg tablet Take 1 Tablet by mouth two (2) times a day for 7 days. , Normal, Disp-14 Tablet, R-0      potassium chloride (Klor-Con) 20 mEq pack Take 1 Packet by mouth daily. , Normal, Disp-2 Packet, R-0         CONTINUE these medications which have NOT CHANGED    Details   empagliflozin (Jardiance) 25 mg tablet Take 1 Tablet by mouth nightly., Normal, Disp-90 Tablet, R-3      !! HYDROmorphone (DILAUDID) 8 mg tablet Take 1 Tablet by mouth every three (3) hours as needed for Pain for up to 15 days. Max Daily Amount: 64 mg., Normal, Disp-120 Tablet, R-0      methadone (DOLOPHINE) 10 mg tablet Take 2 Tablets by mouth two (2) times a day AND 3 Tablets nightly. Do all this for 30 days. Max Daily Amount: 70 mg., Normal, Disp-210 Tablet, R-0      !! HYDROmorphone (DILAUDID) 8 mg tablet Take 1 Tablet by mouth every three (3) hours as needed for Pain for up to 15 days. Max Daily Amount: 64 mg., Normal, Disp-120 Tablet, R-0      diclofenac (VOLTAREN) 1 % gel Apply  to affected area four (4) times daily. , Normal, Disp-1 Each, R-2      !! ondansetron (ZOFRAN ODT) 4 mg disintegrating tablet Take 1 Tablet by mouth every eight (8) hours as needed for Nausea or Vomiting., Normal, Disp-30 Tablet, R-0      !! lidocaine (LIDODERM) 5 % 1 Patch by TransDERmal route every twenty-four (24) hours. Apply patch to the affected area for 12 hours a day and remove for 12 hours a day., Normal, Disp-30 Each, R-1      ferrous sulfate 325 mg (65 mg iron) tablet Take 325 mg by mouth Daily (before breakfast). , Historical Med      dutasteride (AVODART) 0.5 mg capsule TAKE ONE CAPSULE BY MOUTH DAILY, Normal, Disp-90 Capsule, R-3      tamsulosin (FLOMAX) 0.4 mg capsule TAKE 1 CAPSULE BY MOUTH ONCE EVERY EVENING, Normal, Disp-90 Capsule, R-3      dexAMETHasone (DECADRON) 4 mg tablet Take 4 mg by mouth daily (with breakfast). , Normal, Disp-5 Tablet, R-0      diphenoxylate-atropine (LomotiL) 2.5-0.025 mg per tablet Take 1 Tablet by mouth four (4) times daily as needed for Diarrhea. Max Daily Amount: 4 Tablets., Normal, Disp-60 Tablet, R-0      !! ondansetron (Zofran ODT) 4 mg disintegrating tablet Take 1 Tablet by mouth every eight (8) hours as needed for Nausea., Normal, Disp-10 Tablet, R-0      Creon 36,000-114,000- 180,000 unit cpDR capsule TAKE TWO TO THREE CAPSULES BY MOUTH WITH EACH MEAL AND ONE CAPSULE FOR SNACKS DAILY, Normal, Disp-1080 Capsule, R-3, PREETI      colestipoL (COLESTID) 1 gram tablet Historical Med      cyanocobalamin (VITAMIN B12) 1,000 mcg/mL injection 1 mL by IntraMUSCular route every seven (7) days. , No Print, Disp-6 mL, R-6      calcium carbonate (CALCIUM 500 PO) Take  by mouth., Historical Med      azelastine (ASTEPRO) 205.5 mcg (0.15 %) 2 Sprays by Both Nostrils route two (2) times a day., Normal, Disp-1 Bottle, R-0      vitamin A (AQUASOL A) 10,000 unit capsule TAKE 1 CAPSULE BY MOUTH ONE TIME A DAY, Normal, Disp-90 Capsule, R-3      OLANZapine (ZyPREXA) 10 mg tablet Take 1 Tab by mouth See Admin Instructions. Take nightly for 4 days starting the evening of chemotherapy., Normal, Disp-20 Tab, R-1      lidocaine-prilocaine (EMLA) topical cream Apply  to affected area as needed for Pain.  30-45 min prior to treatments, Normal, Disp-30 g, R-3 naloxone (NARCAN) 4 mg/actuation nasal spray Use 1 spray intranasally, then discard. Repeat with new spray every 2 min as needed for opioid overdose symptoms, alternating nostrils. , Normal, Disp-1 Each,R-0      aluminum & magnesium hydroxide-simethicone (Maalox Maximum Strength) 400-400-40 mg/5 mL suspension Take 10 mL by mouth every six (6) hours as needed for Indigestion. , Historical Med      vitamin E (AQUA GEMS) 400 unit capsule Take 1 Cap by mouth daily. , Normal, Disp-90 Cap,R-3      gabapentin (NEURONTIN) 100 mg capsule Take 3 capsules by mouth twice a day., Normal, Disp-540 Cap,R-3      ramipriL (ALTACE) 5 mg capsule Take 1 Cap by mouth daily. , Normal, Disp-90 Cap,R-3      !! lidocaine (LIDODERM) 5 % 1 Patch by TransDERmal route every twenty-four (24) hours. Apply patch to the affected area for 12 hours a day and remove for 12 hours a day., Normal, Disp-30 Each,R-5      finasteride (PROSCAR) 5 mg tablet TAKE 1 TABLET BY MOUTH EVERY DAY, Historical Med      acetaminophen (TYLENOL) 325 mg tablet Take 2 Tabs by mouth every four (4) hours as needed for Pain or Fever (Over the counter medication). , Normal, Disp-1 Tab, R-0Over the counter medication      alpha-D-galactosidase (BEANO PO) Take 1 Tab by mouth two (2) times a day., Historical Med      cetirizine (ZYRTEC) 10 mg tablet Take 10 mg by mouth nightly., Historical Med       !! - Potential duplicate medications found. Please discuss with provider. 2.   Follow-up Information     Follow up With Specialties Details Why Contact Info    Ayse Lee, DO Internal Medicine  As needed [facility]  [phone]      [facility] EMERGENCY DEPT Emergency Medicine  If symptoms worsen [address]  [address]  [phone]        3. Return to ED if worse     Diagnosis     Clinical Impression:   1. SOB (shortness of breath)    2. Bilateral pleural effusion    3.  Malignant neoplasm metastatic to lung, unspecified laterality ([facility] [address]    4. Peripheral edema    5. Acute bronchitis, unspecified organism        Attestations:    Ney Jimenez MD    Please note that this dictation was completed with [employer], the computer voice recognition software. Quite often unanticipated grammatical, syntax, homophones, and other interpretive errors are inadvertently transcribed by the computer software. Please disregard these errors. Please excuse any errors that have escaped final proofreading. Thank you.

## 2022-02-18 NOTE — ED NOTES
Patient given printed discharge instructions reviewed by the MD. Patient understands instructions/follow up recommendations. Patient ambulated out of ED with spouse.

## 2022-02-18 NOTE — PROGRESS NOTES
HPRP Outreach    Call placed to pt, Verified  and address for HIPAA security. 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 ER    Diagnosis Comment   SOB (shortness of breath)    Bilateral pleural effusion    Malignant neoplasm metastatic to lung, unspecified laterality (HCC)    Peripheral edema    Acute bronchitis, unspecified organism        TC details     Pt reported he had noted a wt gain of 10 lbs over the past week in addition to dyspnea and edema of legs/feet. \"My wife told me to go the ER. \" Wt - 148 yesterday and 151 lbs today. Pt reported he is waiting now at pharmacy to  scripts. CM reviewed new scripts with pt. Plan of action  Provide support to patient. Pt will follow a low sodium diet and continue to monitor wt   Pt will send PCP a [employer] message tonight and call first thing Monday AM for an appt asap. Oncology appt - 22   F/U with pt in 1 week     Goals       Attends follow-up appointments as directed. PCP - 11/10/21  Oncology - 21  Infusion - 21  Palliative - 21 call placed to pt, no answer. 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 Patient will adhere to pain management plan put in place by ED physician. Will discuss PT with PCP.   Supportive resources in place to maintain patient in the community (ie. Home Health, DME equipment, refer to, medication assistant plan, etc.)       [facility] - # [phone] 24/7  Nurse Access line 24/7  Be Well  [address] [facility] [phone]  HR Service Now - Henny  [facility] Workday  IT - [phone]  [employer] Help - 1- [phone]  Associate Services for advice and direction, by calling [phone] and pressing 1 or Absence. [email or web address]  [employer] - [phone] Go to [employer] on the Internet or your mobile device and enter the password [email or web address] to access resources, educational information, and self-service options.

## 2022-02-21 NOTE — TELEPHONE ENCOUNTER
----- Message from Charityvito Sabillon sent at 2/21/2022  8:35 AM EST -----  Subject: Appointment Request    Reason for Call: Routine Hospital Follow Up    QUESTIONS  Type of Appointment? Established Patient  Reason for appointment request? Available appointments did not meet   patient need  Additional Information for Provider? Dorothy Mora was seen at [facility]   on 2/18/22 for swollen legs and feet. Needing follow up ASAP but only   seeing appt for May 2022.  ---------------------------------------------------------------------------  --------------  Chevy JENNINGS  What is the best way for the office to contact you? OK to leave message on   voicemail  Preferred Call Back Phone Number? [phone]  ---------------------------------------------------------------------------  --------------  SCRIPT ANSWERS  Relationship to Patient? Self  (Patient requests to see provider urgently. )? No  (Has the patient been discharged from the hospital within 2 business days   AND does not have a Telephone Encounter  Follow Up From [address] [place] [address]   documented in [address]?)? No  Do you have any questions for your primary care provider that need to be   answered prior to your appointment? (Use RN Triage if question pertains to   anything on the red flag list)? No  (Patient needs follow up visit after hospital discharge) Book first   available appointment within 7 days OF DISCHARGE, if no appt, proceed to   book the next available time slot within 14 days OF DISCHARGE AND Send   Message to Provider. [address] Follow Up appointment cannot be booked   beyond 14 Days and should result in a Message to Provider. ? Yes   Have you been diagnosed with, awaiting test results for, or told that you   are suspected of having COVID-19 (Coronavirus)? (If patient has tested   negative or was tested as a requirement for work, school, or travel and   not based on symptoms, answer no)?  No  Within the past 10 days have you developed any of the following symptoms (answer no if symptoms have been present longer than 10 days or began   more than 10 days ago)? Fever or Chills, Cough, Shortness of breath or   difficulty breathing, Loss of taste or smell, Sore throat, Nasal   congestion, Sneezing or runny nose, Fatigue or generalized body aches   (answer no if pain is specific to a body part e.g. back pain), Diarrhea,   Headache? No  Have you had close contact with someone with COVID-19 in the last 7 days? No  (Service Expert  click yes below to proceed with [employer] As Usual   Scheduling)?  Yes

## 2022-02-21 NOTE — TELEPHONE ENCOUNTER
Returned call to [facility] patients PA for Methadone 10 mg #210/30 days has been denied due to dosing over #90 tabs/ 30 day supply, provider will have to fax a statement explaining need for dosing over the recommended allowed amount, tried and failed medications, and consideration ocf dosing for his age      Patients ID# [id number]

## 2022-02-21 NOTE — TELEPHONE ENCOUNTER
Jean Pierre Multani is calling requesting more information regarding the Prior Authorization for  Medication methadone. States for nurse to just call and reference methadone PA.

## 2022-02-23 NOTE — PROGRESS NOTES
\[facility] [place] [facility]\""P Outreach    Call placed to patient, Verified  and address for HIPAA security. CM confirmed with pt that he is now straight medicare and is no longer on his wife's insurance. Case will be transferred to [facility].

## 2022-02-23 NOTE — PROGRESS NOTES
Candida Carrasco is a 73 y.o. male who presents for evaluation of ED follow up for edema. Last seen by me nov 10, 2021 in [facility] 125 visit. Has had rough time since then. Had ED visit due to increased edema, up into his scrotum. Was given 20 mg lasix by ed and d/c to home. I called in 40 mg, which he has taken for past few days. Weight is down 11 lbs since last visit with me. Has appt later today to follow up on recent PET scans regarding his metastatic cholangiocarcinoma. Pain at this time is pretty well controlled with methadone, though he has one spot on left hip that bothers him. He hopes to get radiation to that area. 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 ROS:  Constitutional: negative for fevers, chills, anorexia and weight loss  Eyes:   negative for visual disturbance and irritation  ENT:   negative for tinnitus,sore throat,nasal congestion,ear pain,hoarseness  Respiratory:  negative for cough, hemoptysis, dyspnea,wheezing  CV:   negative for chest pain, palpitations, lower extremity edema  GI:   negative for nausea, vomiting, diarrhea, abdominal pain,melena  Genitourinary: negative for frequency, dysuria and hematuria  Musculoskel: negative for myalgias, arthralgias, back pain, muscle weakness, joint pain  Neurological:  negative for headaches, dizziness, focal weakness, numbness  Psychiatric:     Negative for depression or anxiety      Past Medical History:   Diagnosis Date    Aneurysm ([address] 2008    CEREBRAL    Arrhythmia     Previous a.fib, ablation, NSR now; NO LONGER FOLLOWED BY CARDIOLOGIST.     Autoimmune disease ([address]     SJOGREN'S    Cancer ([address] 2020    COMMON BILE DUCT, ADENOCARCINOMA, SPINE    Diabetes ([address]     NIDDM    Family history of skin cancer     Hypertension     Sepsis ([address] 2017    STENTING TO BILE DUCT    Status post chemotherapy     Stroke [facility]) 2008    brain aneurysm - no deficits    Sun-damaged skin     Sunburn, blistering        Past Surgical History:   Procedure Laterality Date    HX CHOLECYSTECTOMY      HX GI      COLONOSCOPY, POLYPS (BENIGN)    HX GI  10/06/2017    [facility]     HX GI  2020    WHIPPSHAZIA REVISION    HX HEART CATHETERIZATION  2005    Ablation     HX HERNIA REPAIR  12/2020    HERNIA REPAIR    HX ORTHOPAEDIC  2000    Spinal fusion W/ HARDWARE    HX OTHER SURGICAL  11/07/2017    [place] Cath, Dr. Lori Anders HX OTHER SURGICAL  01/11/2021    RIGHT IJ PORT-A-CATH PLACEMENT     HX VASCULAR ACCESS  2017    PORTACATH - then removed    IR ABLATE BONE TUMOR PERC W CRYO  12/23/2021    IR KYPHOPLASTY LUMBAR  1/12/2021    NEUROLOGICAL PROCEDURE UNLISTED  2005    Linden hole washout from cerebral hemorrhage    DC ABDOMEN SURGERY PROC UNLISTED  10/2017    APRIL       Family History   Problem Relation Age of Onset    Cancer Father         Breast and Colon, MELANOMA    Hypertension Father     Cancer Mother         LEUKEMIA    No Known Problems Sister     Atrial Fibrillation Brother     No Known Problems Sister     No Known Problems Son     No Known Problems Son     Anesth Problems Neg Hx        Social History     Socioeconomic History    Marital status:      Spouse name: Not on file    Number of children: Not on file    Years of education: Not on file    Highest education level: Not on file   Occupational History    Not on file   Tobacco Use    Smoking status: Never Smoker    Smokeless tobacco: Never Used   Vaping Use    Vaping Use: Never used   Substance and Sexual Activity    Alcohol use: Never     Comment: very rare    Drug use: No    Sexual activity: Yes     Partners: Female   Other Topics Concern    Not on file   Social History Narrative    Not on file     Social Determinants of Health     Financial Resource Strain:     Difficulty of Paying Living Expenses: Not on file   Food Insecurity:     Worried About Running Out of Food in the Last Year: Not on file    [address] in the Last Year: Not on file   Transportation Needs:     Lack of Transportation (Medical): Not on file    Lack of Transportation (Non-Medical): Not on file   Physical Activity:     Days of Exercise per Week: Not on file    Minutes of Exercise per Session: Not on file   Stress:     Feeling of Stress : Not on file   Social Connections:     Frequency of Communication with Friends and Family: Not on file    Frequency of Social Gatherings with Friends and Family: Not on file    Attends Spiritism Services: Not on file    Active Member of [address] [place] [address] [employer] or Organizations: Not on file    Attends Club or Organization Meetings: Not on file    Marital Status: Not on file   Intimate Partner Violence:     Fear of Current or Ex-Partner: Not on file    Emotionally Abused: Not on file    Physically Abused: Not on file    Sexually Abused: Not on file   Housing Stability:     Unable to Pay for Housing in the Last Year: Not on file    Number of [place] in the Last Year: Not on file    Unstable Housing in the Last Year: Not on file            Visit Vitals  BP 99/61 (BP 1 Location: Left upper arm, BP Patient Position: Sitting)   Pulse 72   Temp 97.4 °F (36.3 °C) (Temporal)   Resp 20   Ht 5' 8\" (1.727 m)   Wt 145 lb 9.6 oz (66 kg)   SpO2 97%   BMI 22.14 kg/m²       Physical Examination:   General - Well appearing male. Looks more fragile and frail than in past  HEENT - PERRL, TM no erythema/opacification, normal nasal turbinates, no oropharyngeal erythema or exudate, MMM  Neck - supple, no bruits, no thyroidomegaly, no lymphadenopathy  Pulm - clear to auscultation bilaterally  Cardio - RRR, normal S1 S2, no murmur  Abd - soft, nontender, no masses, no HSM  Extrem - 1+ edema both lower legs, +2 distal pulses  Neuro-  No focal deficits, CN intact     Assessment/Plan:    1. Bilateral leg edema--he states much improved since increased dose of lasix to 40. Will check bmp, prealbumin. I suspect edema is due to low albumin levels.   Encouraged him to increase protein in his diet  2.  Htn, with relative hypotension--stop altace. Continue lasix 3x per week, suggested mwf  3.  Dm, type 2--stopped jardiance due to cost.  Continue glucophage--suggested just once daily for now, as his gi system is tolerating it. With weight loss, might not need 2nd dose  4. Cholangiocarcinoma with mets--sp whipple. Working with palliative care, on methadone. Hopes to get radiation treatment to left hip soon  5.   bph--on avodart, flomax, proscar    rtc 3 months        Axel Courser III, DO

## 2022-02-23 NOTE — PROGRESS NOTES
Laura Dominique. is a 73 y.o. male here for follow up for met cholangiocarcinoma. Patient is here to discuss recent scans. Pt has some pain in his right lower back. His pain meds where adjusted and are working better. He is on Clindamycin for leg swelling. 1. Have you been to the ER, urgent care clinic since your last visit? Hospitalized since your last visit? no    2. Have you seen or consulted any other health care providers outside of the [address] [place] [address] since your last visit? Include any pap smears or colon screening.   no

## 2022-02-23 NOTE — PATIENT INSTRUCTIONS
Leg and Ankle Edema: Care Instructions  Your Care Instructions  Swelling in the legs, ankles, and feet is called edema. It is common after you sit or stand for a while. Long plane flights or car rides often cause swelling in the legs and feet. You may also have swelling if you have to stand for long periods of time at your job. Problems with the veins in the legs (varicose veins) and changes in hormones can also cause swelling. Sometimes the swelling in the ankles and feet is caused by a more serious problem, such as heart failure, infection, blood clots, or liver or kidney disease. Follow-up care is a key part of your treatment and safety. Be sure to make and go to all appointments, and call your doctor if you are having problems. It's also a good idea to know your test results and keep a list of the medicines you take. How can you care for yourself at home? · If your doctor gave you medicine, take it as prescribed. Call your doctor if you think you are having a problem with your medicine. · Whenever you are resting, raise your legs up. Try to keep the swollen area higher than the level of your heart. · Take breaks from standing or sitting in one position. ? Walk around to increase the blood flow in your lower legs. ? Move your feet and ankles often while you stand, or tighten and relax your leg muscles. · Wear support stockings. Put them on in the morning, before swelling gets worse. · Eat a balanced diet. Lose weight if you need to. · Limit the amount of salt (sodium) in your diet. Salt holds fluid in the body and may increase swelling. When should you call for help? Call 911 anytime you think you may need emergency care. For example, call if:    · You have symptoms of a blood clot in your lung (called a pulmonary embolism). These may include:  ? Sudden chest pain. ? Trouble breathing. ? Coughing up blood.    Call your doctor now or seek immediate medical care if:    · You have signs of a blood clot, such as:  ? Pain in your calf, back of the knee, thigh, or groin. ? Redness and swelling in your leg or groin.     · You have symptoms of infection, such as:  ? Increased pain, swelling, warmth, or redness. ? Red streaks or pus. ? A fever. Watch closely for changes in your health, and be sure to contact your doctor if:    · Your swelling is getting worse.     · You have new or worsening pain in your legs.     · You do not get better as expected. Where can you learn more? Go to http://www.gray.com/  Enter Q167 in the search box to learn more about \"Leg and Ankle Edema: Care Instructions. \"  Current as of: July 1, 2021               Content Version: 13.0  © 2006-2021 [employer]. Care instructions adapted under license by [employer] (which disclaims liability or warranty for this information). If you have questions about a medical condition or this instruction, always ask your healthcare professional. [address] any warranty or liability for your use of this information. Try to increase the amount of protein that you eat. Stop altace, as bp is running low. Use lasix 3x weekly for now, increase if edema worsens. Stick with glucophage just once daily for now.

## 2022-02-24 NOTE — PROGRESS NOTES
Potassium level is ok, but protein level (prealbumin) is very low. I suspect that is the cause of his edema. Work on increasing protein in diet. Use lasix as discussed--use 3x per week for now, adjust as needed.

## 2022-02-24 NOTE — LETTER
2/25/2022    Patient: Tyra Sandoval. YOB: 1955   Date of Visit: 2/24/2022     Herberth Seymour III, DO  [address]  [address]  [facility].  [address] [place]    Dear Irene Zamora DO,      Thank you for referring Mr. Katia Yu to [address] [place] [address] for evaluation. My notes for this consultation are attached. If you have questions, please do not hesitate to call me. I look forward to following your patient along with you.       Sincerely,    Bo Wan MD

## 2022-02-24 NOTE — PROGRESS NOTES
Letter mailed to patient. Called, spoke to pt. Two identifiers confirmed. Pt notified of results/recommendations per Dr. Jun Eagle. Pt verbalized understanding of information discussed w/ no further questions at this time. Potassium level is ok, but protein level (prealbumin) is very low.  I suspect that is the cause of his edema.  Work on increasing protein in diet.  Use lasix as discussed--use 3x per week for now, adjust as needed.

## 2022-02-24 NOTE — PROGRESS NOTES
[address] [facility] [address], [address] [facility], [address] [place] [address], 200 [facility]  [phone]    Follow-up Note      Patient: Layla Bravo MRN: [id number]  SSN: [id number]    YOB: 1955  Age: 73 y.o. Sex: male        Diagnosis:     1. 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 S/P Pancreatoduodenectomy on  10/06/2017  2. Adjuvant monotherapy with Capecitabine - s/p 6 cycles   (12/4/2017 - 05/2018)  3. SBRT RP node/soft tissue 04/2020  4. Palliative chemotherapy   Cis/Cecil/Abraxane - s/p 6 cycles - discontinued d/t progression of disease    Subjective:      Layla Bravo is a 73 y.o. male with a diagnosis of extrahepatic cholangiocarcinoma with metastatic to the lungs, bones and retroperitoneal node/soft tissue. He presented to the ED in August 2017 with obstructive jaundice. He was found to have an obstructive lesion in the dital bile duct. The CT showed marked intrahepatic biliary dilation and common bile duct dilation to the level of the mid common bile duct, which ws markedly narrowed. He underwent a stenting procedure followed by spyglass cholangiogram. The brushings revealed a diagnosis of cholangiocarcinoma. He underwent a Whipple procedure on 10/06/2017. He completed 6 cycles of adjuvant Xeloda. PET scan showed increased activity in the soft tissue along the SMA. CT guided biopsy showed local recurrence. He underwent SBRT by Dr. Andrew Collier in April 2020. He was admitted with severe back pain. Repeat CT shows growth of tumor in the L4/L5, lung and RP node/soft tissue. He underwent a CT guided celiac plexus block which has helped the back pain immensely.  A biopsy of the L4 vertebrae confirms a diagnosis of metastatic cholangiocarcinoma. Mr. Moose Olmos received palliative chemotherapy. He has been off of treatment since the end of May 2021. Imaging shows slow progression of disease. Due to slow progression and the fact that the disease is not measurable by RECIST criteria, we have elected to just do surveillance. He is experiencing worsening pain in the L5 vertebrae. He comes in to discuss the next steps after undergoing repeat CT. Review of Systems:    Constitutional: fatigue  Eyes: negative  Ears, Nose, Mouth, Throat, and Face: negative  Respiratory: negative  Cardiovascular: negative  Gastrointestinal: negative  Genitourinary:negative  Integument/Breast: negative  Hematologic/Lymphatic: negative  Musculoskeletal: low back pain  Neurological: numbness/tingling B/L feet    Review of systems was reviewed and updated as needed on 02/24/22. Past Medical History:   Diagnosis Date    Aneurysm [facility]) 2008    CEREBRAL    Arrhythmia     Previous a.fib, ablation, NSR now; NO LONGER FOLLOWED BY CARDIOLOGIST.     Autoimmune disease ([facility]     SJOGREN'S    Cancer ([facility] 2020    COMMON BILE DUCT, ADENOCARCINOMA, SPINE    Diabetes ([facility]     NIDDM    Family history of skin cancer     Hypertension     Sepsis ([facility] 2017    STENTING TO BILE DUCT    Status post chemotherapy     Stroke [facility]) 2008    brain aneurysm - no deficits    Sun-damaged skin     Sunburn, blistering      Past Surgical History:   Procedure Laterality Date    HX CHOLECYSTECTOMY      HX GI      COLONOSCOPY, POLYPS (BENIGN)    HX GI  10/06/2017    Whipple Dr. Param Zab[facility]     HX GI  2020    WHIPPLE REVISION    HX HEART CATHETERIZATION  2005    Ablation     HX HERNIA REPAIR  12/2020    HERNIA REPAIR    HX ORTHOPAEDIC  2000    Spinal fusion W/ HARDWARE    HX OTHER SURGICAL  11/07/2017    [place] Cath, Dr. Janae Hendrix HX OTHER SURGICAL  01/11/2021    RIGHT IJ PORT-A-CATH PLACEMENT     HX VASCULAR ACCESS  2017    PORTACATH - then removed    IR ABLATE BONE TUMOR PERC W CRYO  12/23/2021    IR KYPHOPLASTY LUMBAR  1/12/2021    NEUROLOGICAL PROCEDURE UNLISTED  2005    Ting hole washout from cerebral hemorrhage    OH ABDOMEN SURGERY PROC UNLISTED  10/2017    APRIL      Family History   Problem Relation Age of Onset    Cancer Father         Breast and Colon, MELANOMA    Hypertension Father     Cancer Mother         LEUKEMIA    No Known Problems Sister     Atrial Fibrillation Brother     No Known Problems Sister     No Known Problems Son     No Known Problems Son     Anesth Problems Neg Hx      Social History     Tobacco Use    Smoking status: Never Smoker    Smokeless tobacco: Never Used   Substance Use Topics    Alcohol use: Never     Comment: very rare      Prior to Admission medications    Medication Sig Start Date End Date Taking? Authorizing Provider   clindamycin (CLEOCIN) 300 mg capsule Take 300 mg by mouth daily. 12/10/21  Yes Provider, Historical   phenylephrine hcl 10 mg tab Take 10 mg by mouth daily. Yes Provider, Historical   prochlorperazine (COMPAZINE) 10 mg tablet Take 10 mg by mouth every six (6) hours as needed. Yes Provider, Historical   diphenhydrAMINE (BENADRYL) 25 mg tablet Take 1 Tablet by mouth nightly as needed. Yes Provider, Historical   metFORMIN (GLUCOPHAGE) 500 mg tablet Take 1 Tablet by mouth daily. Take with food. 2/23/22  Yes Bobby OGDEN III, DO   furosemide (Lasix) 40 mg tablet Take 1 Tablet by mouth every Monday, Wednesday, Friday. 2/23/22  Yes Silas Raya III, DO   potassium chloride (Klor-Con) 20 mEq pack Take 1 Packet by mouth daily. 2/18/22  Yes Alfredo Camargo MD   methadone (DOLOPHINE) 10 mg tablet Take 2 Tablets by mouth two (2) times a day AND 3 Tablets nightly. Do all this for 30 days. Max Daily Amount: 70 mg. 2/16/22 3/18/22 Yes Nia Byrne MD   diclofenac (VOLTAREN) 1 % gel Apply  to affected area four (4) times daily.  2/10/22  Yes Nia Byrne MD   ondansetron (ZOFRAN ODT) 4 mg disintegrating tablet Take 1 Tablet by mouth every eight (8) hours as needed for Nausea or Vomiting. 2/3/22  Yes Douglas Eckert MD   lidocaine (LIDODERM) 5 % 1 Patch by TransDERmal route every twenty-four (24) hours. Apply patch to the affected area for 12 hours a day and remove for 12 hours a day. 1/13/22  Yes Douglas Eckert MD   ferrous sulfate 325 mg (65 mg iron) tablet Take 325 mg by mouth Daily (before breakfast). Yes Provider, Historical   dutasteride (AVODART) 0.5 mg capsule TAKE ONE CAPSULE BY MOUTH DAILY 12/13/21  Yes Silas Raya III, DO   tamsulosin (FLOMAX) 0.4 mg capsule TAKE 1 CAPSULE BY MOUTH ONCE EVERY EVENING 12/13/21  Yes Silas Raya III, DO   dexAMETHasone (DECADRON) 4 mg tablet Take 4 mg by mouth daily (with breakfast). 12/3/21  Yes Maddy Guzman MD   diphenoxylate-atropine (LomotiL) 2.5-0.025 mg per tablet Take 1 Tablet by mouth four (4) times daily as needed for Diarrhea. Max Daily Amount: 4 Tablets. 10/28/21  Yes Johny Sewell MD   ondansetron (Zofran ODT) 4 mg disintegrating tablet Take 1 Tablet by mouth every eight (8) hours as needed for Nausea. 10/15/21  Yes Harmeet Manriquez MD   Creon 36,000-114,000- 180,000 unit cpDR capsule TAKE TWO TO THREE CAPSULES BY MOUTH WITH EACH MEAL AND ONE CAPSULE FOR SNACKS DAILY 10/14/21  Yes Michelle Ewing, DO   colestipoL (COLESTID) 1 gram tablet  9/27/21  Yes Provider, Historical   cyanocobalamin (VITAMIN B12) 1,000 mcg/mL injection 1 mL by IntraMUSCular route every seven (7) days. 8/12/21  Yes Silas Raya III, DO   calcium carbonate (CALCIUM 500 PO) Take  by mouth. Yes Provider, Historical   vitamin A (AQUASOL A) 10,000 unit capsule TAKE 1 CAPSULE BY MOUTH ONE TIME A DAY 6/16/21  Yes Silas Raya III, DO   OLANZapine (ZyPREXA) 10 mg tablet Take 1 Tab by mouth See Admin Instructions. Take nightly for 4 days starting the evening of chemotherapy.  1/29/21  Yes Jaron, Abby Fowler MD   lidocaine-prilocaine (EMLA) topical cream Apply  to affected area as needed for Pain. 30-45 min prior to treatments 1/7/21  Yes Erin Dupont MD   naloxone [facility]) 4 mg/actuation nasal spray Use 1 spray intranasally, then discard. Repeat with new spray every 2 min as needed for opioid overdose symptoms, alternating nostrils. 12/11/20  Yes Diane Shirley MD   aluminum & magnesium hydroxide-simethicone (Maalox Maximum Strength) 400-400-40 mg/5 mL suspension Take 10 mL by mouth every six (6) hours as needed for Indigestion. Yes Provider, Historical   vitamin E (AQUA GEMS) 400 unit capsule Take 1 Cap by mouth daily. 10/26/20  Yes Silas Raya III, DO   gabapentin (NEURONTIN) 100 mg capsule Take 3 capsules by mouth twice a day. 10/20/20  Yes Silas Raya III, DO   lidocaine (LIDODERM) 5 % 1 Patch by TransDERmal route every twenty-four (24) hours. Apply patch to the affected area for 12 hours a day and remove for 12 hours a day. 10/15/20  Yes Silas Raya III, DO   finasteride (PROSCAR) 5 mg tablet TAKE 1 TABLET BY MOUTH EVERY DAY 4/13/20  Yes Provider, Historical   acetaminophen (TYLENOL) 325 mg tablet Take 2 Tabs by mouth every four (4) hours as needed for Pain or Fever (Over the counter medication). 1/2/20  Yes Karuna Najera NP   alpha-D-galactosidase (BEANO PO) Take 1 Tab by mouth two (2) times a day. Yes Other, MD Flynn   cetirizine (ZYRTEC) 10 mg tablet Take 10 mg by mouth nightly.    Yes Provider, Historical          Allergies   Allergen Reactions    Shellfish Derived Anaphylaxis     Soft shell crabs    Morphine Nausea and Vomiting    Pcn [Penicillins] Other (comments)     Pt stated \"always been told PCN\"  Pt tolerated other cephalosporins in the past           Objective:     Visit Vitals  /68 (BP 1 Location: Left upper arm, BP Patient Position: Sitting)   Pulse 73   Temp 98.2 °F (36.8 °C) (Temporal)   Ht 5' 8\" (1.727 m)   Wt 144 lb (65.3 kg)   SpO2 98% BMI 21.90 kg/m²     Pain Scale: /10      Physical Exam:     GENERAL: alert, cooperative, no distress, appears stated age  EYE: negative  LYMPHATIC: Cervical, supraclavicular, and axillary nodes normal.   THROAT & NECK: normal and no erythema or exudates noted. LUNG: clear to auscultation bilaterally  HEART: irregularly, irregular rythm  ABDOMEN: soft, non-tender  EXTREMITIES: trace ankle edema  SKIN: negative  NEUROLOGIC: Numbness in fingertips and feet - mild    The above physical exam was reviewed and updated as needed on 02/24/22. Lab Results   Component Value Date/Time    WBC 4.2 02/18/2022 10:00 AM    Hemoglobin (POC) 10.3 (L) 10/06/2017 01:14 PM    HGB 9.0 (L) 02/18/2022 10:00 AM    Hematocrit (POC) 28 (L) 05/13/2021 09:16 AM    HCT 28.9 (L) 02/18/2022 10:00 AM    PLATELET 80 (L) 85/10/7174 10:00 AM    MCV 88.9 02/18/2022 10:00 AM       Lab Results   Component Value Date/Time    Sodium 132 (L) 02/23/2022 09:02 AM    Potassium 4.2 02/23/2022 09:02 AM    Chloride 101 02/23/2022 09:02 AM    CO2 30 02/23/2022 09:02 AM    Anion gap 1 (L) 02/23/2022 09:02 AM    Glucose 245 (H) 02/23/2022 09:02 AM    BUN 16 02/23/2022 09:02 AM    Creatinine 1.24 02/23/2022 09:02 AM    BUN/Creatinine ratio 13 02/23/2022 09:02 AM    GFR est AA >60 02/23/2022 09:02 AM    GFR est non-AA 59 (L) 02/23/2022 09:02 AM    Calcium 8.8 02/23/2022 09:02 AM    Bilirubin, total 0.8 02/18/2022 10:00 AM    Alk. phosphatase 125 (H) 02/18/2022 10:00 AM    Protein, total 5.2 (L) 02/18/2022 10:00 AM    Albumin 2.7 (L) 02/18/2022 10:00 AM    Globulin 2.5 02/18/2022 10:00 AM    A-G Ratio 1.1 02/18/2022 10:00 AM    ALT (SGPT) 14 02/18/2022 10:00 AM    AST (SGOT) 7 (L) 02/18/2022 10:00 AM             Assessment:     1.  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 Local recurrence in the para-aortic soft tissue - S/P SBRT     Recent CT shows progression of disease in the retroperitoneum, L4, lungs  The disease is unresectable. Treatment will in a palliative intent with a goal to extend life. Receiving palliative chemotherapy   Cis/Ashe/Abraxane - s/p 6 cycles - last cycle 5/31/2021    CT - small progressive lung nodules  Too small for NCI/NIH trial  Pain in the sacrum and pelvis is worse  Repeat CT - disease progression in abdominal nodes, liver, lungs and bones  Refer to Rad-Onc for palliative radiation. Due to progression of disease, I recommend starting mFOLFOX chemotherapy. I counseled the patient regarding the chemotherapy. Discussions included side-effect, toxicity, benefit and risks of chemotherapy. He understood the expected side-effect which includes alopecia, nausea, peripheral neuropathy, neutropenic fever, anemia, need for transfusion among other things. After weighing the benefit and risks, he agreed to proceed with chemotherapy. 2. Cancer related pain     S/P celiac plexus block - done by Dr. Evgeny Granados   Following with Palliative medicine. Pain meds  Refer to Rad-Onc for palliative radiation      3. Bone metastasis, L4/L5    S/P Ablation/Kyphoplasty - Dr. Evgeny Granados  On Denosumab      5. Chemotherapy related neuropathy    Stable  Grade I       6. Atrial fibrillation    Cardiology      Plan:     1. Start mFOLFOX  2. Return in 1-2 weeks      Signed by: Millie Severance, NP                     February 24, 2022      I personally saw and evaluated the patient and performed the key components of medical decision making. The history, physical exam, and documentation were performed by Sukhdeep Mccormack NP.   I reviewed and verified the above documentation and modified it as needed. Signed by: Domenica Mcintosh MD                     February 25, 2022        CC. Mario Caballero MD  CC. Chris Redd MD  CC. Brandi Xavier MD  CC. Brandee Dia MD  CC.  Queenie Lindsay MD

## 2022-02-25 PROBLEM — C78.00 CHOLANGIOCARCINOMA METASTATIC TO LUNG (HCC): Status: ACTIVE | Noted: 2022-01-01

## 2022-02-25 PROBLEM — C22.1 CHOLANGIOCARCINOMA METASTATIC TO LUNG (HCC): Status: ACTIVE | Noted: 2022-01-01

## 2022-03-07 PROBLEM — E11.22 TYPE 2 DIABETES MELLITUS WITH CHRONIC KIDNEY DISEASE (HCC): Status: ACTIVE | Noted: 2022-01-01

## 2022-03-07 NOTE — PROGRESS NOTES
HISTORY OF PRESENT ILLNESS  Kiel Miller is a 73 y.o. male. HPI     PCP Dr Kory Krishna  Hx Metastatic Cholangiocarcinoma -s/p whipple 2017, DM-2 since 2017, HTN PAF    Pt reports fsbs wre 115-20 last month but started going up to 200's last month while on Jardiance-too expensive  Start on metformin 2 weeks ago and increased to 1000mg every day this week--fsbs 240-250  No hyperglycemic symptoms  Some emesis once per day randomly but eating ok--slightly less  finishing xrt and will start palliative chemo soon    Patient Active Problem List    Diagnosis Date Noted    Type 2 diabetes mellitus with chronic kidney disease ([facility] 03/07/2022    [facility] lung [facility] 02/25/2022    Opioid use, unspecified with unspecified opioid-induced disorder 11/15/2021    Intractable back pain     Intractable abdominal pain 12/29/2020    Right inguinal hernia 12/11/2020    Nausea & vomiting 09/02/2020    Abdominal pain 12/31/2019    Pancreatitis 11/17/2019    Acute pancreatitis 08/16/2018    Leukocytosis 08/16/2018    Controlled type 2 diabetes mellitus without complication, without long-term current use of insulin ([facility] 07/30/2018    Vomiting 07/05/2018    Paroxysmal atrial fibrillation ([facility] 10/26/2017    S/P ablation of atrial fibrillation 10/26/2017    Essential hypertension 10/26/2017    Cramps, muscle, general 10/26/2017    Low vitamin D level 10/26/2017    Low vitamin B12 level 10/26/2017    Cholangiocarcinoma determined by biopsy of biliary tract ([facility] 10/06/2017    Cholangiocarcinoma of biliary tract ([facility] 09/11/2017    Common bile duct (CBD) obstruction 07/25/2017    UTI (urinary tract infection) 07/25/2017    Obstructive jaundice 07/23/2017     Current Outpatient Medications   Medication Sig Dispense Refill    clindamycin (CLEOCIN) 300 mg capsule Take 300 mg by mouth daily.  diphenhydrAMINE (BENADRYL) 25 mg tablet Take 1 Tablet by mouth nightly as needed.       metFORMIN (GLUCOPHAGE) 500 mg tablet Take 1 Tablet by mouth daily. Take with food. (Patient taking differently: Take 500 mg by mouth two (2) times daily (with meals). Take with food.) 90 Tablet 3    furosemide (Lasix) 40 mg tablet Take 1 Tablet by mouth every Monday, Wednesday, Friday. 90 Tablet 1    methadone (DOLOPHINE) 10 mg tablet Take 2 Tablets by mouth two (2) times a day AND 3 Tablets nightly. Do all this for 30 days. Max Daily Amount: 70 mg. 210 Tablet 0    diclofenac (VOLTAREN) 1 % gel Apply  to affected area four (4) times daily. 1 Each 2    lidocaine (LIDODERM) 5 % 1 Patch by TransDERmal route every twenty-four (24) hours. Apply patch to the affected area for 12 hours a day and remove for 12 hours a day. 30 Each 1    ferrous sulfate 325 mg (65 mg iron) tablet Take 325 mg by mouth Daily (before breakfast).  dutasteride (AVODART) 0.5 mg capsule TAKE ONE CAPSULE BY MOUTH DAILY 90 Capsule 3    tamsulosin (FLOMAX) 0.4 mg capsule TAKE 1 CAPSULE BY MOUTH ONCE EVERY EVENING 90 Capsule 3    dexAMETHasone (DECADRON) 4 mg tablet Take 4 mg by mouth daily (with breakfast). 5 Tablet 0    diphenoxylate-atropine (LomotiL) 2.5-0.025 mg per tablet Take 1 Tablet by mouth four (4) times daily as needed for Diarrhea. Max Daily Amount: 4 Tablets. 60 Tablet 0    ondansetron (Zofran ODT) 4 mg disintegrating tablet Take 1 Tablet by mouth every eight (8) hours as needed for Nausea. 10 Tablet 0    Creon 36,000-114,000- 180,000 unit cpDR capsule TAKE TWO TO THREE CAPSULES BY MOUTH WITH EACH MEAL AND ONE CAPSULE FOR SNACKS DAILY 1080 Capsule 3    colestipoL (COLESTID) 1 gram tablet       cyanocobalamin (VITAMIN B12) 1,000 mcg/mL injection 1 mL by IntraMUSCular route every seven (7) days. 6 mL 6    calcium carbonate (CALCIUM 500 PO) Take  by mouth.  vitamin A (AQUASOL A) 10,000 unit capsule TAKE 1 CAPSULE BY MOUTH ONE TIME A DAY 90 Capsule 3    OLANZapine (ZyPREXA) 10 mg tablet Take 1 Tab by mouth See Admin Instructions. Take nightly for 4 days starting the evening of chemotherapy. 20 Tab 1    lidocaine-prilocaine (EMLA) topical cream Apply  to affected area as needed for Pain. 30-45 min prior to treatments 30 g 3    aluminum & magnesium hydroxide-simethicone (Maalox Maximum Strength) 400-400-40 mg/5 mL suspension Take 10 mL by mouth every six (6) hours as needed for Indigestion.  vitamin E (AQUA GEMS) 400 unit capsule Take 1 Cap by mouth daily. 90 Cap 3    gabapentin (NEURONTIN) 100 mg capsule Take 3 capsules by mouth twice a day. 540 Cap 3    lidocaine (LIDODERM) 5 % 1 Patch by TransDERmal route every twenty-four (24) hours. Apply patch to the affected area for 12 hours a day and remove for 12 hours a day. 30 Each 5    finasteride (PROSCAR) 5 mg tablet TAKE 1 TABLET BY MOUTH EVERY DAY      acetaminophen (TYLENOL) 325 mg tablet Take 2 Tabs by mouth every four (4) hours as needed for Pain or Fever (Over the counter medication). 1 Tab 0    alpha-D-galactosidase (BEANO PO) Take 1 Tab by mouth two (2) times a day.  cetirizine (ZYRTEC) 10 mg tablet Take 10 mg by mouth nightly.  glipiZIDE (GLUCOTROL) 5 mg tablet TAKE 1 TABLET BY MOUTH DAILY WITH BREAKFAST 90 Tablet 0    phenylephrine hcl 10 mg tab Take 10 mg by mouth daily.  prochlorperazine (COMPAZINE) 10 mg tablet Take 10 mg by mouth every six (6) hours as needed.  potassium chloride (Klor-Con) 20 mEq pack Take 1 Packet by mouth daily. (Patient not taking: Reported on 3/7/2022) 2 Packet 0    naloxone (NARCAN) 4 mg/actuation nasal spray Use 1 spray intranasally, then discard. Repeat with new spray every 2 min as needed for opioid overdose symptoms, alternating nostrils.  1 Each 0     Allergies   Allergen Reactions    Shellfish Derived Anaphylaxis     Soft shell crabs    Morphine Nausea and Vomiting    Pcn [Penicillins] Other (comments)     Pt stated \"always been told PCN\"  Pt tolerated other cephalosporins in the past      Lab Results   Component Value Date/Time    WBC 4.2 02/18/2022 10:00 AM    HGB 9.0 (L) 02/18/2022 10:00 AM    Hemoglobin (POC) 10.3 (L) 10/06/2017 01:14 PM    HCT 28.9 (L) 02/18/2022 10:00 AM    Hematocrit (POC) 28 (L) 05/13/2021 09:16 AM    PLATELET 80 (L) 41/12/7723 10:00 AM    MCV 88.9 02/18/2022 10:00 AM     Lab Results   Component Value Date/Time    GFR est non-AA 59 (L) 02/23/2022 09:02 AM    GFRNA, POC >60 02/10/2022 02:58 PM    GFR est AA >60 02/23/2022 09:02 AM    GFRAA, POC >60 02/10/2022 02:58 PM    Creatinine 1.24 02/23/2022 09:02 AM    Creatinine (POC) 0.96 02/10/2022 02:58 PM    BUN 16 02/23/2022 09:02 AM    BUN (POC) 9 05/13/2021 09:16 AM    Sodium 132 (L) 02/23/2022 09:02 AM    Sodium (POC) 132 (L) 02/10/2022 02:58 PM    Potassium 4.2 02/23/2022 09:02 AM    Potassium (POC) 4.0 02/10/2022 02:58 PM    Chloride 101 02/23/2022 09:02 AM    Chloride (POC) 98 02/10/2022 02:58 PM    CO2 30 02/23/2022 09:02 AM    Magnesium 2.2 02/10/2022 02:50 PM    Phosphorus 2.4 (L) 02/10/2022 02:50 PM        ROS    Physical Exam  Vitals and nursing note reviewed. Constitutional:       General: He is not in acute distress. Appearance: Normal appearance. He is well-developed. Comments: Appears stated age   HENT:      Head: Normocephalic. Cardiovascular:      Rate and Rhythm: Normal rate and regular rhythm. Heart sounds: Normal heart sounds. Pulmonary:      Effort: Pulmonary effort is normal.      Breath sounds: Normal breath sounds. Abdominal:      Palpations: Abdomen is soft. Neurological:      Mental Status: He is alert. ASSESSMENT and PLAN  Diagnoses and all orders for this visit:    1.  Type 2 diabetes mellitus without complication, without long-term current use of insulin (HCC)  -     AMB POC HEMOGLOBIN A1C 9.5   -     AMB POC GLUCOSE BLOOD, BY GLUCOSE MONITORING DEVICE-[id number]   contnue metformin 1000mg every day  ` Add glipizide 5 mg every day--need to monitor for hypoglycemia--fsbs bid   Refer to pharm D to fu glucose   If develops increased n/v or unable to eat on chemo then may need to hold the glipizide    2. Type 2 diabetes mellitus with chronic kidney disease, without long-term current use of insulin, unspecified CKD stage ([facility]    3. Cholangiocarcinoma metastatic to lung, unspecified laterality ([facility]      Follow-up and Dispositions    · Return in about 3 weeks (around 3/28/2022) for fu DM-2.

## 2022-03-07 NOTE — PROGRESS NOTES
Thanh Davis. is a 73 y.o. male here for follow up for met cholangiocarcinoma. Treatment today:  Cycle 1 Day 1 FOLFOX 6 MOD + radiation   He is also getting Hiram Simper today. Pt states he is doing ok. States he lower back pain is better today than it has been. 1. Have you been to the ER, urgent care clinic since your last visit? Hospitalized since your last visit? no    2. Have you seen or consulted any other health care providers outside of the [address] [place] [address] since your last visit? Include any pap smears or colon screening.   radiation

## 2022-03-07 NOTE — PATIENT INSTRUCTIONS
Office Policies    Phone calls/patient messages:            Please allow up to 24 hours for someone in the office to contact you about your call or message. Be mindful your provider may be out of the office or your message may require further review. We encourage you to use [employer] for your messages as this is a faster, more efficient way to communicate with our office                         Medication Refills:            Prescription medications require 48-72 business hours to process. We encourage you to use [employer] for your refills. For controlled medications: Please allow 72 business hours to process. Certain medications may require you to  a written prescription at our office. NO narcotic/controlled medications will be prescribed after 4pm Monday through Friday or on weekends              Form/Paperwork Completion:            Please note a $25 fee may incur for all paperwork for completed by our providers. We ask that you allow 7-10 business days. Pre-payment is due prior to picking up/faxing the completed form. You may also download your forms to [employer] to have your doctor print off.

## 2022-03-07 NOTE — PROGRESS NOTES
1. \"Have you been to the ER, urgent care clinic since your last visit? Hospitalized since your last visit? \" no    2. \"Have you seen or consulted any other health care providers outside of the [address] [place] [address] since your last visit? \"  Cancer treatments [facility]    3. For patients aged 39-70: Has the patient had a colonoscopy / FIT/ Cologuard?  Due to have every 5 years

## 2022-03-08 NOTE — PROGRESS NOTES
[address], [address] [facility] [place], [address] [place] [address], [address] [place] [address]  [phone]    Follow-up Note      Patient: Karel Manriquez MRN: [id number]  SSN: [id number]    YOB: 1955  Age: 73 y.o. Sex: male        Diagnosis:     1. 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 S/P Pancreatoduodenectomy on  10/06/2017  2. Adjuvant monotherapy with Capecitabine - s/p 6 cycles   (12/4/2017 - 05/2018)  3. SBRT RP node/soft tissue 04/2020  4. Palliative chemotherapy   Cis/Callao/Abraxane - s/p 6 cycles - discontinued d/t progression of disease               mFOLFOX cycle 1 day 1     Subjective:      Karel Manriquez is a 73 y.o. male with a diagnosis of extrahepatic cholangiocarcinoma with metastatic to the lungs, bones and retroperitoneal node/soft tissue. He presented to the ED in August 2017 with obstructive jaundice. He was found to have an obstructive lesion in the dital bile duct. The CT showed marked intrahepatic biliary dilation and common bile duct dilation to the level of the mid common bile duct, which ws markedly narrowed. He underwent a stenting procedure followed by spyglass cholangiogram. The brushings revealed a diagnosis of cholangiocarcinoma. He underwent a Whipple procedure on 10/06/2017. He completed 6 cycles of adjuvant Xeloda. PET scan showed increased activity in the soft tissue along the SMA. CT guided biopsy showed local recurrence. He underwent SBRT by Dr. Valencia Mcbride in April 2020. He was admitted with severe back pain. Repeat CT shows growth of tumor in the L4/L5, lung and RP node/soft tissue. He underwent a CT guided celiac plexus block which has helped the back pain immensely.  A biopsy of the L4 vertebrae confirms a diagnosis of metastatic cholangiocarcinoma. Mr. Michelle Fuchs received palliative chemotherapy. He has been off of treatment since the end of May 2021. Imaging shows slow progression of disease. Due to slow progression and the fact that the disease is not measurable by RECIST criteria, we have elected to just do surveillance. He is experiencing worsening pain in the L5 vertebrae. He is here today to start mFOLFOX. Review of Systems:    Constitutional: fatigue  Eyes: negative  Ears, Nose, Mouth, Throat, and Face: negative  Respiratory: negative  Cardiovascular: negative  Gastrointestinal: negative  Genitourinary:negative  Integument/Breast: negative  Hematologic/Lymphatic: negative  Musculoskeletal: low back pain  Neurological: numbness/tingling B/L feet    Review of systems was reviewed and updated as needed on 03/09/22. Past Medical History:   Diagnosis Date    Aneurysm [facility]) 2008    CEREBRAL    Arrhythmia     Previous a.fib, ablation, NSR now; NO LONGER FOLLOWED BY CARDIOLOGIST.     Autoimmune disease ([facility] [address]     SJOGREN'S    Cancer ([facility] [facility] 2020    COMMON BILE DUCT, ADENOCARCINOMA, SPINE    Diabetes ([facility]     NIDDM    Family history of skin cancer     Hypertension     Sepsis ([facility] 2017    STENTING TO BILE DUCT    Status post chemotherapy     Stroke [facility]) 2008    brain aneurysm - no deficits    Sun-damaged skin     Sunburn, blistering      Past Surgical History:   Procedure Laterality Date    HX CHOLECYSTECTOMY      HX GI      COLONOSCOPY, POLYPS (BENIGN)    HX GI  10/06/2017    Whipple Dr. Ruma [facility]     HX GI  2020    WHIPPLE REVISION    HX HEART CATHETERIZATION  2005    Ablation     HX HERNIA REPAIR  12/2020    HERNIA REPAIR    HX ORTHOPAEDIC  2000    Spinal fusion W/ HARDWARE    HX OTHER SURGICAL  11/07/2017    [place], Dr. Leblanc  HX OTHER SURGICAL  01/11/2021    RIGHT IJ PORT-A-CATH PLACEMENT     HX VASCULAR ACCESS  2017    PORTACATH - then removed    IR ABLATE BONE TUMOR PERC W CRYO  12/23/2021    IR KYPHOPLASTY LUMBAR  1/12/2021    NEUROLOGICAL PROCEDURE UNLISTED  2005    Ting hole washout from cerebral hemorrhage    ID ABDOMEN SURGERY PROC UNLISTED  10/2017    APRIL      Family History   Problem Relation Age of Onset    Cancer Father         Breast and Colon, MELANOMA    Hypertension Father     Cancer Mother         LEUKEMIA    No Known Problems Sister     Atrial Fibrillation Brother     No Known Problems Sister     No Known Problems Son     No Known Problems Son     Anesth Problems Neg Hx      Social History     Tobacco Use    Smoking status: Never Smoker    Smokeless tobacco: Never Used   Substance Use Topics    Alcohol use: Never     Comment: very rare      Prior to Admission medications    Medication Sig Start Date End Date Taking? Authorizing Provider   glipiZIDE (GLUCOTROL) 5 mg tablet TAKE 1 TABLET BY MOUTH DAILY WITH BREAKFAST 3/7/22  Yes Deonna Paniagua MD   clindamycin (CLEOCIN) 300 mg capsule Take 300 mg by mouth daily. 12/10/21  Yes Provider, Historical   phenylephrine hcl 10 mg tab Take 10 mg by mouth daily. Yes Provider, Historical   prochlorperazine (COMPAZINE) 10 mg tablet Take 10 mg by mouth every six (6) hours as needed. Yes Provider, Historical   diphenhydrAMINE (BENADRYL) 25 mg tablet Take 1 Tablet by mouth nightly as needed. Yes Provider, Historical   metFORMIN (GLUCOPHAGE) 500 mg tablet Take 1 Tablet by mouth daily. Take with food. Patient taking differently: Take 500 mg by mouth two (2) times daily (with meals). Take with food. 2/23/22  Yes Yossi OGDEN III, DO   furosemide (Lasix) 40 mg tablet Take 1 Tablet by mouth every Monday, Wednesday, Friday. 2/23/22  Yes Silas Raya III, DO   methadone (DOLOPHINE) 10 mg tablet Take 2 Tablets by mouth two (2) times a day AND 3 Tablets nightly. Do all this for 30 days.  Max Daily Amount: 70 mg. 2/16/22 3/18/22 Yes Marsha Iverson MD   diclofenac (VOLTAREN) 1 % gel Apply  to affected area four (4) times daily. 2/10/22  Yes Cristopher Moore MD   lidocaine (LIDODERM) 5 % 1 Patch by TransDERmal route every twenty-four (24) hours. Apply patch to the affected area for 12 hours a day and remove for 12 hours a day. 1/13/22  Yes Cristopher Moore MD   ferrous sulfate 325 mg (65 mg iron) tablet Take 325 mg by mouth Daily (before breakfast). Yes Provider, Historical   dutasteride (AVODART) 0.5 mg capsule TAKE ONE CAPSULE BY MOUTH DAILY 12/13/21  Yes Sials Raya III, DO   tamsulosin (FLOMAX) 0.4 mg capsule TAKE 1 CAPSULE BY MOUTH ONCE EVERY EVENING 12/13/21  Yes Silas Raya III, DO   dexAMETHasone (DECADRON) 4 mg tablet Take 4 mg by mouth daily (with breakfast). 12/3/21  Yes Yeimy Odell MD   diphenoxylate-atropine (LomotiL) 2.5-0.025 mg per tablet Take 1 Tablet by mouth four (4) times daily as needed for Diarrhea. Max Daily Amount: 4 Tablets. 10/28/21  Yes Emerald Walters MD   ondansetron (Zofran ODT) 4 mg disintegrating tablet Take 1 Tablet by mouth every eight (8) hours as needed for Nausea. 10/15/21  Yes Eduarda Castellano MD   Creon 36,000-114,000- 180,000 unit cpDR capsule TAKE TWO TO THREE CAPSULES BY MOUTH WITH EACH MEAL AND ONE CAPSULE FOR SNACKS DAILY 10/14/21  Yes Tanya Senior, DO   colestipoL (COLESTID) 1 gram tablet  9/27/21  Yes Provider, Historical   cyanocobalamin (VITAMIN B12) 1,000 mcg/mL injection 1 mL by IntraMUSCular route every seven (7) days. 8/12/21  Yes Silas Raya III, DO   calcium carbonate (CALCIUM 500 PO) Take  by mouth. Yes Provider, Historical   vitamin A (AQUASOL A) 10,000 unit capsule TAKE 1 CAPSULE BY MOUTH ONE TIME A DAY 6/16/21  Yes Silas Raya III,    OLANZapine (ZyPREXA) 10 mg tablet Take 1 Tab by mouth See Admin Instructions. Take nightly for 4 days starting the evening of chemotherapy.  1/29/21  Yes Maximo Chong MD   lidocaine-prilocaine (EMLA) topical cream Apply  to affected area as needed for Pain. 30-45 min prior to treatments 1/7/21  Yes Caridad Hill MD   naloxone Pranav Countess) 4 mg/actuation nasal spray Use 1 spray intranasally, then discard. Repeat with new spray every 2 min as needed for opioid overdose symptoms, alternating nostrils. 12/11/20  Yes Rosangela Orellana MD   aluminum & magnesium hydroxide-simethicone (Maalox Maximum Strength) 400-400-40 mg/5 mL suspension Take 10 mL by mouth every six (6) hours as needed for Indigestion. Yes Provider, Historical   vitamin E (AQUA GEMS) 400 unit capsule Take 1 Cap by mouth daily. 10/26/20  Yes Silas Raya III, DO   gabapentin (NEURONTIN) 100 mg capsule Take 3 capsules by mouth twice a day. 10/20/20  Yes Silas Raya III, DO   lidocaine (LIDODERM) 5 % 1 Patch by TransDERmal route every twenty-four (24) hours. Apply patch to the affected area for 12 hours a day and remove for 12 hours a day. 10/15/20  Yes Silas Raya III, DO   finasteride (PROSCAR) 5 mg tablet TAKE 1 TABLET BY MOUTH EVERY DAY 4/13/20  Yes Provider, Historical   acetaminophen (TYLENOL) 325 mg tablet Take 2 Tabs by mouth every four (4) hours as needed for Pain or Fever (Over the counter medication). 1/2/20  Yes Alexandre Gerber NP   alpha-D-galactosidase (BEANO PO) Take 1 Tab by mouth two (2) times a day. Yes Other, MD Flynn   cetirizine (ZYRTEC) 10 mg tablet Take 10 mg by mouth nightly. Yes Provider, Historical   potassium chloride (Klor-Con) 20 mEq pack Take 1 Packet by mouth daily.   Patient not taking: Reported on 3/7/2022 2/18/22   Jae Sanchez MD          Allergies   Allergen Reactions    Shellfish Derived Anaphylaxis     Soft shell crabs    Morphine Nausea and Vomiting    Pcn [Penicillins] Other (comments)     Pt stated \"always been told PCN\"  Pt tolerated other cephalosporins in the past           Objective:     Visit Vitals  /73   Pulse 89   Temp 97.1 °F (36.2 °C)   Resp 18   Ht 5' 8\" (1.727 m)   Wt 148 lb (67.1 kg)   SpO2 96%   BMI 22.50 kg/m²     Pain Scale: /10      Physical Exam:     GENERAL: alert, cooperative, no distress, appears stated age  EYE: negative  LYMPHATIC: Cervical, supraclavicular, and axillary nodes normal.   THROAT & NECK: normal and no erythema or exudates noted. LUNG: clear to auscultation bilaterally  HEART: irregularly, irregular rythm  ABDOMEN: soft, non-tender  EXTREMITIES: trace ankle edema  SKIN: negative  NEUROLOGIC: Numbness in fingertips and feet - mild    The above physical exam was reviewed and updated as needed on 03/09/22. Lab Results   Component Value Date/Time    WBC 3.2 (L) 03/09/2022 09:21 AM    Hemoglobin (POC) 10.3 (L) 10/06/2017 01:14 PM    HGB 9.2 (L) 03/09/2022 09:21 AM    Hematocrit (POC) 28 (L) 05/13/2021 09:16 AM    HCT 29.3 (L) 03/09/2022 09:21 AM    PLATELET 88 (L) 59/08/8989 09:21 AM    MCV 89.9 03/09/2022 09:21 AM       Lab Results   Component Value Date/Time    Sodium 134 (L) 03/09/2022 09:21 AM    Potassium 3.7 03/09/2022 09:21 AM    Chloride 101 03/09/2022 09:21 AM    CO2 29 03/09/2022 09:21 AM    Anion gap 4 (L) 03/09/2022 09:21 AM    Glucose 288 (H) 03/09/2022 09:21 AM    BUN 15 03/09/2022 09:21 AM    Creatinine 1.08 03/09/2022 09:21 AM    BUN/Creatinine ratio 14 03/09/2022 09:21 AM    GFR est AA >60 03/09/2022 09:21 AM    GFR est non-AA >60 03/09/2022 09:21 AM    Calcium 8.3 (L) 03/09/2022 09:21 AM    Bilirubin, total 0.8 03/09/2022 09:21 AM    Alk. phosphatase 173 (H) 03/09/2022 09:21 AM    Protein, total 5.7 (L) 03/09/2022 09:21 AM    Albumin 2.9 (L) 03/09/2022 09:21 AM    Globulin 2.8 03/09/2022 09:21 AM    A-G Ratio 1.0 (L) 03/09/2022 09:21 AM    ALT (SGPT) 15 03/09/2022 09:21 AM    AST (SGOT) 7 (L) 03/09/2022 09:21 AM             Assessment:     1.  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 Local recurrence in the para-aortic soft tissue - S/P SBRT     Recent CT shows progression of disease in the retroperitoneum, L4, lungs  The disease is unresectable. Treatment will in a palliative intent with a goal to extend life. Receiving palliative chemotherapy   Cis/[place]osevelt/Abraxane - s/p 6 cycles - last cycle 5/31/2021    CT - small progressive lung nodules  Too small for NCI/NIH trial  Pain in the sacrum and pelvis is worse  Repeat CT - disease progression in abdominal nodes, liver, lungs and bones  Refer to Rad-Onc for palliative radiation. Due to progression of disease, I recommend starting mFOLFOX chemotherapy. Starting mFOLFOX cycle 1 day 1   I explained to the patient the usual side effects and ways to manage it. Blood counts are acceptable. Results reviewed with the patient. Symptom management form reviewed with patient. The disease has a high risk of progression and the treatment also carries a substantial risk of side effects. All of this was assessed during this visit. 2. Cancer related pain     S/P celiac plexus block - done by Dr. Mendez Domínguez   Following with Palliative medicine. Pain meds  Refer to Rad-Onc for palliative radiation      3. Bone metastasis, L4/L5    S/P Ablation/Kyphoplasty - Dr. Mendez Domínguez  On Denosumab      5. Chemotherapy related neuropathy    Stable  Grade I       6. Atrial fibrillation    Cardiology      7. Thrombocytopenia    Observation      Plan:     1. Starting mFOLFOX  2. Follow up in 2 weeks  3. Increase protein. Switch from Boost to glucose controlled supplements  4. Referral to RD      Signed by: Marsha Walters NP                     March 9, 2022       I personally saw and evaluated the patient and performed the key components of medical decision making. The history, physical exam, and documentation were performed by Glendy Rodriguez NP. I reviewed and verified the above documentation and modified it as needed. Signed by: Jona Wilhelm MD                     March 9, 2022        CC. Ino Diego MD  CC. Katherine Mitchell MD  CC. Caity Lugo MD  CC. Anton York MD  CC.  Carlos Angulo MD

## 2022-03-09 NOTE — PROGRESS NOTES
OUTPATIENT INFUSION CENTER    DISCHARGE INSTRUCTIONS FOR:  CHEMOTHERAPY / BIOTHERAPY    Chemotherapy has the potential to cause many side effects. The following are general precautions that chemo patients should take:    1. Practice good hand washing:   * Use soap and water for at least 15 seconds, covering all areas of hands. * Always wash hands before eating. * Wash hands after contact with public surfaces such as door knobs and         handles, shopping carts, telephones and elevator buttons. 2. Get plenty of rest:    * You will likely experience fatigue three to five days following your treatment. It may last as long as seven days. 3. Drink plenty of fluids. Water is best.    4. Eat a well balanced diet:  * Small frequent meals may help if you are having trouble with nausea or your  appetite. Some people also do well with nutritional supplements. 5. Pace yourself with daily activities:   * Take frequent breaks and ask for help if you need it. 6. Exercise is very important:  * It will increase circulation and will help the fatigue. Do what you can each day. 7. If your regimen results in hair loss:  *  You will likely notice effects between two and three weeks following your first treatment. Some lose all hair while others only experience thinning. 8. Practice good oral hygiene:   *  Notify your M.D. immediately if any mouth sores or discomfort develop. 9. Protect yourself from the sun. Signs/Symptoms of an allergic reaction and/or some side effects may require immediate medical attention. Notify your physician if you develop one or more of the following:     Temperature of 100.5 degrees or greater;   Skin redness, itching, swelling, blistering, weeping, crusting, rash, or hives;    Wheezing, chest tightness, cough, or shortness of breath;   Swelling of the face, eyelids, lips, tongue, or throat;  Severe, persistent headache;  Stuffy nose, runny nose, sneezing;   Red (bloodshot), itchy, swollen, or watery eyes;   Stomach  pain, nausea, vomiting, diarrhea, or bloody stools;  Mouth sores        Your physician should also be aware of the following symptoms:    Persistent and unresolved nausea and/or vomiting;   Persistent and unresolved diarrhea or constipation;   Numbness/tingling/burning of the extremities, including the fingers and toes; Bleeding or unexplained bruising; Unexplained redness/swelling/pain in the arms or legs; Shortness of breath or fatigue that worsens;   Pain with urination or blood in the urine; Chills;  Cough, especially a productive cough;  Mouth sores or a white coating of the tongue; Redness, swelling, pain or drainage at the port-a-cath or IV site; Increased feeling of bloating or water retention; Excessive weight loss or gain;  Ringing in the ears; Difficulty swallowing;  Dizziness, vertigo, lightheadedness or fainting. [facility]  Signature: ____________________________ 3/9/2022  Waqas Belle

## 2022-03-09 NOTE — LETTER
3/9/2022    Patient: Veryl Letters. YOB: 1955   Date of Visit: 3/9/2022     Jayla Games III, DO  [address] [facility] [address]  [address]  [facility]  [place]    Dear Reinaldo Francois,      Thank you for referring Mr. Colby Castro to [address] [place] [address] for evaluation. My notes for this consultation are attached. If you have questions, please do not hesitate to call me. I look forward to following your patient along with you.       Sincerely,    Courtney Keene NP

## 2022-03-09 NOTE — PROGRESS NOTES
Pt is due for radiation at 12noon. This RN called Shankar Foy at [employer] to notify her that he may be 4-5 hours.

## 2022-03-09 NOTE — PROGRESS NOTES
[facility] Chemotherapy Progress Note    Pt admit to [facility] for C1 Folfox and Xgeva  in stable condition. Assessment completed. Patient denied having any symptoms of COVID-19, i.e. SOB, coughing, fever, or generally not feeling well. Also denies having been exposed to COVID-19 recently or having had any recent contact with family/friend that has a pending COVID test.     R chest port accessed with 1\" Alan Belt w/o issue, with positive blood return. Labs drawn per order and sent. Line flushed, clamped, Curos Cap applied to end clave. Chemotherapy Flowsheet 3/9/2022   Cycle C1   Date 3/9/2022   Drug / Regimen Folfox   Pre Hydration -   Post Hydration Xgeva given   Pre Meds given   Notes given       Recent Results (from the past 24 hour(s))   METABOLIC PANEL, COMPREHENSIVE    Collection Time: 03/09/22  9:21 AM   Result Value Ref Range    Sodium 134 (L) 136 - 145 mmol/L    Potassium 3.7 3.5 - 5.1 mmol/L    Chloride 101 97 - 108 mmol/L    CO2 29 21 - 32 mmol/L    Anion gap 4 (L) 5 - 15 mmol/L    Glucose 288 (H) 65 - 100 mg/dL    BUN 15 6 - 20 MG/DL    Creatinine 1.08 0.70 - 1.30 MG/DL    BUN/Creatinine ratio 14 12 - 20      GFR est AA >60 >60 ml/min/1.73m2    GFR est non-AA >60 >60 ml/min/1.73m2    Calcium 8.3 (L) 8.5 - 10.1 MG/DL    Bilirubin, total 0.8 0.2 - 1.0 MG/DL    ALT (SGPT) 15 12 - 78 U/L    AST (SGOT) 7 (L) 15 - 37 U/L    Alk.  phosphatase 173 (H) 45 - 117 U/L    Protein, total 5.7 (L) 6.4 - 8.2 g/dL    Albumin 2.9 (L) 3.5 - 5.0 g/dL    Globulin 2.8 2.0 - 4.0 g/dL    A-G Ratio 1.0 (L) 1.1 - 2.2     MAGNESIUM    Collection Time: 03/09/22  9:21 AM   Result Value Ref Range    Magnesium 2.4 1.6 - 2.4 mg/dL   PHOSPHORUS    Collection Time: 03/09/22  9:21 AM   Result Value Ref Range    Phosphorus 2.5 (L) 2.6 - 4.7 MG/DL   HEPATITIS PANEL, ACUTE    Collection Time: 03/09/22  9:21 AM   Result Value Ref Range    Hepatitis A, IgM NONREACTIVE NR      __          Hepatitis B surface Ag <0.10 Index    Hep B surface Ag Interp. Negative NEG      __          Hepatitis B core, IgM NONREACTIVE NR      __          Hep C virus Ab Interp. NONREACTIVE NR     CBC WITH AUTOMATED DIFF    Collection Time: 03/09/22  9:21 AM   Result Value Ref Range    WBC 3.2 (L) 4.1 - 11.1 K/uL    RBC 3.26 (L) 4.10 - 5.70 M/uL    HGB 9.2 (L) 12.1 - 17.0 g/dL    HCT 29.3 (L) 36.6 - 50.3 %    MCV 89.9 80.0 - 99.0 FL    MCH 28.2 26.0 - 34.0 PG    MCHC 31.4 30.0 - 36.5 g/dL    RDW 15.5 (H) 11.5 - 14.5 %    PLATELET 88 (L) 642 - 400 K/uL    MPV 11.3 8.9 - 12.9 FL    NRBC 0.0 0  WBC    ABSOLUTE NRBC 0.00 0.00 - 0.01 K/uL    NEUTROPHILS 72 32 - 75 %    LYMPHOCYTES 10 (L) 12 - 49 %    MONOCYTES 10 5 - 13 %    EOSINOPHILS 6 0 - 7 %    BASOPHILS 1 0 - 1 %    IMMATURE GRANULOCYTES 1 (H) 0.0 - 0.5 %    ABS. NEUTROPHILS 2.4 1.8 - 8.0 K/UL    ABS. LYMPHOCYTES 0.3 (L) 0.8 - 3.5 K/UL    ABS. MONOCYTES 0.3 0.0 - 1.0 K/UL    ABS. EOSINOPHILS 0.2 0.0 - 0.4 K/UL    ABS. BASOPHILS 0.0 0.0 - 0.1 K/UL    ABS. IMM. GRANS. 0.0 0.00 - 0.04 K/UL    DF SMEAR SCANNED      RBC COMMENTS NORMOCYTIC, NORMOCHROMIC         Corrected calcium 9.18. Pt denies recent or upcoming dental work.      Medications:  Medications Administered     0.9% sodium chloride injection 10 mL     Admin Date  03/09/2022 Action  Given Dose  30 mL Route  IntraVENous Administered By  Chapito Lucas          denosumab (XGEVA) injection 120 mg     Admin Date  03/09/2022 Action  Given Dose  120 mg Route  SubCUTAneous Administered By  Chapito Lucas          dexamethasone (DECADRON) 12 mg in 0.9% sodium chloride 50 mL IVPB     Admin Date  03/09/2022 Action  New Bag Dose  12 mg Route  IntraVENous Administered By  Chapito Lucas          dextrose 5% infusion     Admin Date  03/09/2022 Action  New Bag Dose  25 mL/hr Rate  25 mL/hr Route  IntraVENous Administered By  Chapito Lucas          fluorouraciL (ADRUCIL) 4,248 mg in 0.9% sodium chloride 100 mL CADD Cassette     Admin Date  03/09/2022 Action  New Bag Dose  4,248 mg Rate  2.2 mL/hr Route  IntraVENous Administered By  Patricia Kimble          fluorouraciL (ADRUCIL) chemo syringe 708 mg     Admin Date  03/09/2022 Action  Given Dose  708 mg Route  IntraVENous Administered By  Patricia Kimble          leucovorin (WELLCOVORIN) 708 mg in dextrose 5% 250 mL, overfill volume 25 mL IVPB     Admin Date  03/09/2022 Action  New Bag Dose  708 mg Rate  155.2 mL/hr Route  IntraVENous Administered By  Patricia Kimble          oxaliplatin (ELOXATIN) 150.5 mg in dextrose 5% 250 mL, overfill volume 25 mL chemo infusion     Admin Date  03/09/2022 Action  New Bag Dose  150.5 mg Rate  152.6 mL/hr Route  IntraVENous Administered By  Patricia Kimble          palonosetron HCl (ALOXI) injection 0.25 mg     Admin Date  03/09/2022 Action  Given Dose  0.25 mg Route  IntraVENous Administered By  Patricia Kimble                Pt tolerated treatment well. Port maintained positive blood return throughout treatment. D/c home in no distress. Pt aware of next [facility] appointment scheduled for: 3/11/22 at 1430 for pump DC.      Future Appointments   Date Time Provider Sloane Roach   3/10/2022  1:30 PM Mira Salgado MD [facility] BS AMB   3/11/2022  2:30 PM Lalo Herrera [address] REG   3/23/2022  9:00 AM [place] [address] REG   3/25/2022  2:30 PM [place] FASTRACK [address] REG   3/29/2022 11:30 AM Rishi Ross PHARMD [facility] BS AMB   4/6/2022  9:00 AM [place] [facility] REG   4/8/2022  2:30 PM [place] FASTRACK [address] REG   5/25/2022  8:30 AM Rashmi Raya III, DO [facility] BS AMB

## 2022-03-10 NOTE — PROGRESS NOTES
Palliative Medicine Outpatient Services  Jesus: [phone]    Patient Name: Frantz Hood. YOB: 1955    Date of Current Visit: 03/10/22  Location of Current Visit:    [] [facility] Office  [] [facility] Office  [] [address] [place] [address]  [] Home  [x] Other: Virtual synchronous A/V visit    Date of Initial Visit: 4/6/2020  Referral from: Dr. Tex Carrillo  Primary Care Physician: Kelli Orozco DO      SUMMARY:   Frantz Perez is a 73y.o. 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 Tex Carrillo for management of symptoms and psychosocial support. 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 This is second marriage for both of them. Current treatment-completed RT to the para-aortic mass, pursuing diagnostics with GI physician Dr. Meaghan Jaquez for gastroparesis and pancreatic enzymes, has had biliary stents replaced. Status post celiac plexus block and reversal of Whipple procedure on 9/9/2020    Hospitalization at [facility] for uncontrolled pain in December 2020    L5 biopsy, ablation and kyphoplasty on 1/12/2021    Current treatment-on cisplatin plus gemcitabine plus Abraxane, chemotherapy currently on hold  chemotherapy versus functional diarrhea    Status post ostiocool procedure to the right hip     PALLIATIVE DIAGNOSES:       ICD-10-CM ICD-9-CM    1. Cholangiocarcinoma of biliary tract (HCC)  C22.1 155.1    2. Pain from bone metastases (HCC)  G89.3 338.3     C79.51     3.  Neoplastic malignant related fatigue  R53.0 780.79    4. Primary osteoarthritis of both hips  M16.0 715.15           PLAN:   Patient Instructions   Dear Caren Darnell,     It was a pleasure seeing you today by virtual video visit.     This is the plan we talked about:    -Disease discussion  -We reviewed your most recent CT and bone scan. There is progression of disease in the lung nodules. You are having more pain in your left lower back but this does not correlate with new bony mets in the left side. However, there is left hydronephrosis which is kidney swelling from the retroperitoneal tumor pushing on your renal vein. This could be the cause of your flank pain. Please call your urologist to make an appointment. .     -Chronic low back pain from new L5 met status post ablation and kyphoplasty, new and progressive right hip pain from sacral met  -This pain is fairly well controlled with the changes last time  -Continue methadone  20 mg in the morning and noon and 30 mg at bedtime  -You are back on hydromorphone despite it causing you hives because you did not think oxycodone was helpful and it also made you very sleepy. -Continue hydromorphone 8 mg every 3 hours as needed. He has not needed hydromorphone since increase in methadone and radiation to the hip.  -Add Advil 400 mg with breakfast lunch and dinner  -Apply lidocaine patch and Voltaren gel to your right hip which helps you the most.      -Chemo-induced diarrhea  -This is resolved. You are no longer on tincture of opium.    -Sjogren's disease  -You are very careful to stay well hydrated. You drink water and sugar-free gatorade or powerade.     Chemotherapy-induced nausea  -Please take Zofran every 4 hours as needed and Compazine 10 mg every 6 hours while you have the chemo pump on.   You are going to the infusion center to have it removed tomorrow and I will arrange for you to have IV fluids along with some Zofran and Pepcid intravenously. -Fatigue  -Sometimes you cannot do what you want to do because of the tiredness of your legs. You can get what you need to do done as long as you take breaks.    -Nausea  Your nausea has returned. I provided you with Compazine.    -Follow up  -W I will see you again in 4 weeks       reviewed and appropriate. Most recent CT scan reviewed and results discussed with patient. See results below in data portion of note. GOALS OF CARE / TREATMENT PREFERENCES:   [====Goals of Care====]  GOALS OF CARE:  Patient / health care proxy stated goals: See Patient Instructions / Summary    TREATMENT PREFERENCES:   Code Status:  [x] Attempt Resuscitation       [] Do Not Attempt Resuscitation    Advance Care Planning:  [x] The [employer] Interdisciplinary Team has updated the ACP Navigator with Decision Maker and Patient Capacity      Primary [facility] - [phone]    Secondary Decision Maker: Ridge Cristina III - Child - [phone]    Supplemental (Other) Decision Maker: Doug Armenta Child - [phone]  Advance Care Planning 3/10/2022   Patient's Healthcare Decision Maker is: Named in scanned ACP document   Primary Decision Maker Name -   Primary Decision Maker Phone Number -   Primary Decision Maker Relationship to Patient -   Confirm Advance Directive Yes, on file   Patient Would Like to Complete Advance Directive -   Does the patient have other document types -       Other:  (If patient appropriate for POST, consider using PALLPOST smart phrase here)    The palliative care team has discussed with patient / health care proxy about goals of care / treatment preferences for patient.  [====Goals of Care====]     PRESCRIPTIONS GIVEN:     No orders of the defined types were placed in this encounter.           FOLLOW UP:     Future Appointments   Date Time Provider Sloane Roach   3/11/2022  2:30 PM PEMBERTON JIMENA 3 [address] [facility]   3/23/2022  9:00 AM [address] [place] [address] [facility] REG   3/23/2022  9:30 AM Marnell Hamman, NP ONCMR BS AMB   3/25/2022  2:30 PM PEMBERTON FASTRACK 2 [facility] REG   3/29/2022 11:30 AM Javi King PHARMD [facility] BS AMB   4/6/2022  9:00 AM PEMBERTON LONG5 [place] [address] REG   4/6/2022  9:30 AM Marnell Hamman, NP ONCMR BS AMB   4/8/2022  2:30 PM PEMBERTON FASTRACK 3 [facility] REG   4/20/2022  2:00 PM [address] REG   4/22/2022  2:30 PM PEMBERTON FASTRACK 5 [address] REG   5/4/2022  9:00 AM [id number] [facility] REG   5/6/2022  3:00 PM PEMBERTON FASTRACK 7 [address] REG   5/18/2022  9:00 AM [id number] [facility] REG   5/20/2022  2:30 PM PEMBERTON FASTRACK 3 [address] REG   5/25/2022  8:30 AM ElverMihaela Plant III, DO [facility]3 BS AMB           PHYSICIANS INVOLVED IN CARE:   Patient Care Team:  Kelli Orozco DO as PCP - General (Internal Medicine)  Kelli Orozco DO as PCP - [address] [place] [address] Provider  Gage Escobar MD (General Surgery)  Govind Chua MD (Gastroenterology)  Rosa Mendoza MD (Gastroenterology)  Dahlia Goodman MD (Hematology and Oncology)  Nia Byrne MD as Palliative Care (Palliative Medicine)       HISTORY:   Reviewed patient-completed ESAS and advance care planning form. Reviewed patient record in prescription monitoring program.    CHIEF COMPLAINT:   No chief complaint on file. HPI/SUBJECTIVE:    The patient is: [x] Verbal / [] Nonverbal     He is doing much better since radiation to the head and increasing methadone. He is not taking Dilaudid and a regular basis at this time. He uses about 1 or 2 doses every 2 days or so. His main complaint today is nausea with the chemotherapy.  -------    Patient here with his wife. Both are very good historians. Mid upper back pain-much improved since radiation to the periaortic mass. He struggled with pain for several months before it was identified as cancer recurrence.   He was started on fentanyl 25 mcg patch which he wants to wean off of. He has made upper and mid zone abdominal pain which comes and goes. He has not noticed any specific pattern to the pain but usually the pain comes on before vomiting or relieves without vomiting. Sometimes, he vomits food that he ate about 12 to 14 hours ago. No constipation. He has 2 liquid bowel movements every day. He is unable to tolerate eggs or honey. He is getting tests done to evaluate his pancreatic enzymes. He has very good support through his wife. Clinical Pain Assessment (nonverbal scale for nonverbal patients):   [++++ Clinical Pain Assessment++++]  [++++Pain Severity++++]: Pain: 3  [++++Pain Character++++]: cramping  [++++Pain Duration++++]: months  [++++Pain Effect++++]: functional   [++++Pain Factors++++]: none in particular  [++++Pain Frequency++++]: on and off  [++++Pain Location++++]: upper abdomen and mid back  [++++ Clinical Pain Assessment++++]       FUNCTIONAL ASSESSMENT:     Palliative Performance Scale (PPS):  PPS: 70       PSYCHOSOCIAL/SPIRITUAL SCREENING:     Any spiritual / Druze concerns:  [] Yes /  [x] No    Caregiver Burnout:  [] Yes /  [x] No /  [] No Caregiver Present      Anticipatory grief assessment:   [x] Normal  / [] Maladaptive       ESAS Anxiety: Anxiety: 0    ESAS Depression: Depression: 0       REVIEW OF SYSTEMS:     The following systems were [x] reviewed / [] unable to be reviewed  Systems: constitutional, ears/nose/mouth/throat, respiratory, gastrointestinal, genitourinary, musculoskeletal, integumentary, neurologic, psychiatric, endocrine. Positive findings noted below.   Modified ESAS Completed by: provider   Fatigue: 7 Drowsiness: 7   Depression: 0 Pain: 3   Anxiety: 0 Nausea: 8   Anorexia: 8 Dyspnea: 7   Best Well-Bein Constipation: No   Other Problem (Comment): 0          PHYSICAL EXAM:     Wt Readings from Last 3 Encounters:   22 148 lb (67.1 kg)   22 148 lb 1.6 oz (67.2 kg)   22 147 lb (66.7 kg)     There were no vitals taken for this visit. Last bowel movement: See Nursing Note    Constitutional    [x] Appears well-developed and well-nourished in no apparent distress    [] Abnormal:  Mental status  [x] Alert and awake  [x] Oriented to person/place/time  [x] Able to follow commands  [] Abnormal:   Eyes  [x] EOM normal   [x] Sclera normal   [x] No visible ocular discharge  [] Abnormal:   HENT  [x] Normocephalic, atraumatic  [x] Mouth/Throat: Moist mucous membranes   [x] External Ears normal  [] Abnormal:  Neck  [x] No visualized mass  [] Abnormal:  Pulmonary/Chest   [x] Respiratory effort normal  [x] No visualized signs of difficulty breathing or respiratory distress  [] Abnormal:  Musculoskeletal  [x] Normal gait with no signs of ataxia  [x] Normal range of motion of neck  [] Abnormal:  Neurological:   [x] No facial asymmetry (Cranial nerve 7 motor function)  [x] No gaze palsy  [] Abnormal:   Skin  [x] No significant exanthematous lesions or discoloration noted on facial skin  [] Abnormal:                                  Psychiatric  [x] Normal affect  [x] No hallucinations  [] Abnormal:    Other pertinent observable physical exam findings:    Due to this being a TeleHealth evaluation, many elements of the physical examination are unable to be assessed. HISTORY:     Past Medical History:   Diagnosis Date    Aneurysm ([address] 2008    CEREBRAL    Arrhythmia     Previous a.fib, ablation, NSR now; NO LONGER FOLLOWED BY CARDIOLOGIST.     Autoimmune disease ([address]     SJOGREN'S    Cancer ([address] 2020    COMMON BILE DUCT, ADENOCARCINOMA, SPINE    Diabetes ([address]     NIDDM    Family history of skin cancer     Hypertension     Sepsis ([address] 2017    STENTING TO BILE DUCT    Status post chemotherapy     Stroke [facility]) 2008    brain aneurysm - no deficits    Sun-damaged skin     Sunburn, blistering       Past Surgical History:   Procedure Laterality Date    HX CHOLECYSTECTOMY      HX GI      COLONOSCOPY, POLYPS (BENIGN)    HX GI  10/06/2017    April Zabala [facility]     HX GI  2020    WHIPP REVISION    HX HEART CATHETERIZATION  2005    Ablation     HX HERNIA REPAIR  12/2020    HERNIA REPAIR    HX ORTHOPAEDIC  2000    Spinal fusion W/ HARDWARE    HX OTHER SURGICAL  11/07/2017    [place] Cath, Dr. Janae Hendrix HX OTHER SURGICAL  01/11/2021    RIGHT IJ PORT-A-CATH PLACEMENT     HX VASCULAR ACCESS  2017    PORTACATH - then removed    IR ABLATE BONE TUMOR PERC W CRYO  12/23/2021    IR KYPHOPLASTY LUMBAR  1/12/2021    NEUROLOGICAL PROCEDURE UNLISTED  2005    Ting hole washout from cerebral hemorrhage    MO ABDOMEN SURGERY PROC UNLISTED  10/2017    APRIL      Family History   Problem Relation Age of Onset    Cancer Father         Breast and Colon, MELANOMA    Hypertension Father     Cancer Mother         LEUKEMIA    No Known Problems Sister     Atrial Fibrillation Brother     No Known Problems Sister     No Known Problems Son     No Known Problems Son     Anesth Problems Neg Hx       History reviewed, no pertinent family history. Social History     Tobacco Use    Smoking status: Never Smoker    Smokeless tobacco: Never Used   Substance Use Topics    Alcohol use: Never     Comment: very rare     Allergies   Allergen Reactions    Shellfish Derived Anaphylaxis     Soft shell crabs    Morphine Nausea and Vomiting    Pcn [Penicillins] Other (comments)     Pt stated \"always been told PCN\"  Pt tolerated other cephalosporins in the past      Current Outpatient Medications   Medication Sig    glipiZIDE (GLUCOTROL) 5 mg tablet TAKE 1 TABLET BY MOUTH DAILY WITH BREAKFAST    clindamycin (CLEOCIN) 300 mg capsule Take 300 mg by mouth daily.  phenylephrine hcl 10 mg tab Take 10 mg by mouth daily.  prochlorperazine (COMPAZINE) 10 mg tablet Take 10 mg by mouth every six (6) hours as needed.  diphenhydrAMINE (BENADRYL) 25 mg tablet Take 1 Tablet by mouth nightly as needed.     metFORMIN (GLUCOPHAGE) 500 mg tablet Take 1 Tablet by mouth daily. Take with food. (Patient taking differently: Take 500 mg by mouth two (2) times daily (with meals). Take with food.)    furosemide (Lasix) 40 mg tablet Take 1 Tablet by mouth every Monday, Wednesday, Friday.  potassium chloride (Klor-Con) 20 mEq pack Take 1 Packet by mouth daily. (Patient not taking: Reported on 3/7/2022)    methadone (DOLOPHINE) 10 mg tablet Take 2 Tablets by mouth two (2) times a day AND 3 Tablets nightly. Do all this for 30 days. Max Daily Amount: 70 mg.    diclofenac (VOLTAREN) 1 % gel Apply  to affected area four (4) times daily.  lidocaine (LIDODERM) 5 % 1 Patch by TransDERmal route every twenty-four (24) hours. Apply patch to the affected area for 12 hours a day and remove for 12 hours a day.  ferrous sulfate 325 mg (65 mg iron) tablet Take 325 mg by mouth Daily (before breakfast).  dutasteride (AVODART) 0.5 mg capsule TAKE ONE CAPSULE BY MOUTH DAILY    tamsulosin (FLOMAX) 0.4 mg capsule TAKE 1 CAPSULE BY MOUTH ONCE EVERY EVENING    dexAMETHasone (DECADRON) 4 mg tablet Take 4 mg by mouth daily (with breakfast).  diphenoxylate-atropine (LomotiL) 2.5-0.025 mg per tablet Take 1 Tablet by mouth four (4) times daily as needed for Diarrhea. Max Daily Amount: 4 Tablets.  ondansetron (Zofran ODT) 4 mg disintegrating tablet Take 1 Tablet by mouth every eight (8) hours as needed for Nausea.  Creon 36,000-114,000- 180,000 unit cpDR capsule TAKE TWO TO THREE CAPSULES BY MOUTH WITH EACH MEAL AND ONE CAPSULE FOR SNACKS DAILY    colestipoL (COLESTID) 1 gram tablet     cyanocobalamin (VITAMIN B12) 1,000 mcg/mL injection 1 mL by IntraMUSCular route every seven (7) days.  calcium carbonate (CALCIUM 500 PO) Take  by mouth.  vitamin A (AQUASOL A) 10,000 unit capsule TAKE 1 CAPSULE BY MOUTH ONE TIME A DAY    OLANZapine (ZyPREXA) 10 mg tablet Take 1 Tab by mouth See Admin Instructions.  Take nightly for 4 days starting the evening of chemotherapy.  lidocaine-prilocaine (EMLA) topical cream Apply  to affected area as needed for Pain. 30-45 min prior to treatments    naloxone [facility]) 4 mg/actuation nasal spray Use 1 spray intranasally, then discard. Repeat with new spray every 2 min as needed for opioid overdose symptoms, alternating nostrils.  aluminum & magnesium hydroxide-simethicone (Maalox Maximum Strength) 400-400-40 mg/5 mL suspension Take 10 mL by mouth every six (6) hours as needed for Indigestion.  vitamin E (AQUA GEMS) 400 unit capsule Take 1 Cap by mouth daily.  gabapentin (NEURONTIN) 100 mg capsule Take 3 capsules by mouth twice a day.  lidocaine (LIDODERM) 5 % 1 Patch by TransDERmal route every twenty-four (24) hours. Apply patch to the affected area for 12 hours a day and remove for 12 hours a day.  finasteride (PROSCAR) 5 mg tablet TAKE 1 TABLET BY MOUTH EVERY DAY    acetaminophen (TYLENOL) 325 mg tablet Take 2 Tabs by mouth every four (4) hours as needed for Pain or Fever (Over the counter medication).  alpha-D-galactosidase (BEANO PO) Take 1 Tab by mouth two (2) times a day.  cetirizine (ZYRTEC) 10 mg tablet Take 10 mg by mouth nightly. No current facility-administered medications for this visit.      Facility-Administered Medications Ordered in Other Visits   Medication Dose Route Frequency    fluorouraciL (ADRUCIL) 4,248 mg in 0.9% sodium chloride 100 mL CADD Cassette  2,400 mg/m2 (Treatment Plan Recorded) IntraVENous ONCE          LAB DATA REVIEWED:     Lab Results   Component Value Date/Time    WBC 3.2 (L) 03/09/2022 09:21 AM    HGB 9.2 (L) 03/09/2022 09:21 AM    PLATELET 88 (L) 63/40/6942 09:21 AM     Lab Results   Component Value Date/Time    Sodium 134 (L) 03/09/2022 09:21 AM    Potassium 3.7 03/09/2022 09:21 AM    Chloride 101 03/09/2022 09:21 AM    CO2 29 03/09/2022 09:21 AM    BUN 15 03/09/2022 09:21 AM    Creatinine 1.08 03/09/2022 09:21 AM    Calcium 8.3 (L) 03/09/2022 09:21 AM Magnesium 2.4 03/09/2022 09:21 AM    Phosphorus 2.5 (L) 03/09/2022 09:21 AM      Lab Results   Component Value Date/Time    Alk. phosphatase 173 (H) 03/09/2022 09:21 AM    Protein, total 5.7 (L) 03/09/2022 09:21 AM    Albumin 2.9 (L) 03/09/2022 09:21 AM    Globulin 2.8 03/09/2022 09:21 AM     Lab Results   Component Value Date/Time    INR 1.1 09/09/2020 02:15 AM    Prothrombin time 11.5 (H) 09/09/2020 02:15 AM    aPTT 29.9 09/09/2020 02:15 AM      Lab Results   Component Value Date/Time    Iron 31 (L) 01/02/2020 03:24 AM    TIBC 245 (L) 01/02/2020 03:24 AM    Iron % saturation 13 (L) 01/02/2020 03:24 AM    Ferritin 122 01/02/2020 03:24 AM     MRI- Dec 2021     IMPRESSION  1. Osseous metastases in the right hemisacrum and right posterosuperior  acetabulum. 2.  Thin-walled rim-enhancing fluid signal focus in the right inguinal region,  indeterminate, but differential considerations include necrotic metastatic lymph  node and fluid collection (e.g., seroma, abscess). Correlate clinically. 3.  Patchy edema signal and enhancement within and surrounding the left anterior  inferior iliac spine and rectus femoris origin, may reflect enthesopathic change  and/or age indeterminate rectus femoris avulsion injury. 4.  Bilateral hip joint osteoarthritis with (likely) degenerative superior  acetabular labral tears.         CT- Nov 2021  IMPRESSION  1. Progression of lung metastases. 2. Stable retroperitoneal tumor causing vascular occlusions/stenoses and left  UPJ obstruction. 3. New sclerotic bone focus/metastasis. Chest CT- aug 2021       IMPRESSION  1. Multiple pulmonary nodules some of which have increased in size which may  represent progression of disease. Several new nodules are noted.     2. Nonspecific foci of enhancement in the right lobe the liver. These may  represent focal areas of inflammation or hyperemia given the  hepaticojejunostomy. Attention on follow-up imaging     3.   Status post Whipple procedure.     4.  Stable periaortic soft tissue density.     5.  Stable perinephric stranding and fluid around the left kidney hilum       CT chest, abdomen and pelvis-May 2021  IMPRESSION     1. Unchanged appearance of the chest, abdomen, and pelvis when compared to  3/1/2021. No acute findings or evidence of disease progression. 2. Unchanged small pulmonary nodules. Unchanged retroperitoneal soft tissue mass  with left renal vein occlusion and collateral retroperitoneal venous structures.       CT abd 3/1/21  IMPRESSION  1. No significant change in the appearance of pulmonary nodules. 2. No significant change in the appearance of retroperitoneal adenopathy/mass. There is mass effect upon the vasculature, including occlusion of the left renal  vein with collateral vessels in the retroperitoneum, likely unchanged. 3. Incidental findings as above. CT chest 12/29/2020  IMPRESSION:     1. Numerous tiny pulmonary parenchymal nodules right greater than left,  suspicious for metastatic disease. 2. Focal ill-defined opacity in the right upper lobe which may represent  infiltrate. Follow-up recommended, however. 3. Incidental findings in the upper abdomen described earlier same day.      CONTROLLED SUBSTANCES SAFETY ASSESSMENT (IF ON CONTROLLED SUBSTANCES):     Reviewed opioid safety handout:  [x] Yes   [] No  24 hour opioid dose >150mg morphine equivalent/day:  [] Yes   [x] No  Benzodiazepines:  [] Yes   [x] No  Sleep apnea:  [] Yes   [x] No  Urine Toxicology Testing within last 6 months:  [] Yes   [x] No  History of or new aberrant medication taking behaviors:  [] Yes   [x] No  Has Narcan been prescribed [x] Yes   [] No          Total time: 45 minutes   Counseling / coordination time: 40  > 50% counseling / coordination?:   Consent:  He and/or health care decision maker is aware that that he may receive a bill for this telehealth service, depending on his insurance coverage, and has provided verbal consent to proceed: Yes    CPT Codes 50405-69578 for Established Patients may apply to this Telehealth Visit  Pursuant to the emergency declaration under the [address] [place] [address] waiver authority and the [employer] Act, this Virtual  Visit was conducted, with patient's consent, to reduce the patient's risk of exposure to COVID-19 and provide continuity of care for an established patient. Services were provided through a video synchronous discussion virtually to substitute for in-person clinic visit.

## 2022-03-10 NOTE — PROGRESS NOTES
Palliative Medicine Office Visit  Palliative Medicine Nurse Check In  [phone]    Patient Name: Candida Alarcon. YOB: 1955      Date of Office Visit: 3/10/2022    Patient states: \"  \"    From Check In Sheet (scanned in Media):  Has a medical provider talked with you about cardiopulmonary resuscitation (CPR)? [x] Yes   [] No   [] Unable to obtain    Nurse reminder to complete or update ACP FlowSheet:    Is ACP on the Problem List?    [x] Yes    [] No  IF ACP Document is ON FILE; Nurse to place ACP on Problem List     Is there an ACP Note in Chart Review/Note? [x] Yes    [] No   If NO: ALERT PROVIDER       Primary Decision MakerLeverette Zhane - [phone]    Secondary Decision Maker: Ridge Cristina III - Child - [phone]    Supplemental (Other) Decision Maker: Lukasz Castro Child - [phone]  Advance Care Planning 3/10/2022   Patient's [address] is: Named in scanned ACP document   Primary Decision Maker Name -   Primary Decision Maker Phone Number -   Primary Decision Maker Relationship to Patient -   Confirm Advance Directive Yes, on file   Patient Would Like to Complete Advance Directive -   Does the patient have other document types -         Is there anything that we should know about you as a person in order to provide you the best care possible? Have you been to the ER, urgent care clinic since your last visit? [] Yes   [x] No   [] Unable to obtain    Have you been hospitalized since your last visit? [] Yes   [x] No   [] Unable to obtain    Have you seen or consulted any other health care providers outside of the [address] [place] [address] since your last visit?    [] Yes   [x] No   [] Unable to obtain    Functional status (describe):         Last BM: 3/10/2022     accessed (date): 3/10/2022    Bottle review (for opioid pain medication):  Medication 1:   Date filled:   Directions:   # filled:   # left:   # pills taking per day:  Last dose taken:    Medication 2:   Date filled:   Directions:   # filled:   # left:   # pills taking per day:  Last dose taken:    Medication 3:   Date filled:   Directions:   # filled:   # left:   # pills taking per day:  Last dose taken:    Medication 4:   Date filled:   Directions:   # filled:   # left:   # pills taking per day:  Last dose taken:

## 2022-03-10 NOTE — PATIENT INSTRUCTIONS
Dear Danielle Johnson.,     It was a pleasure seeing you today by virtual video visit.     This is the plan we talked about:    -Disease discussion  -We reviewed your most recent CT and bone scan. There is progression of disease in the lung nodules. You are having more pain in your left lower back but this does not correlate with new bony mets in the left side. However, there is left hydronephrosis which is kidney swelling from the retroperitoneal tumor pushing on your renal vein. This could be the cause of your flank pain. Please call your urologist to make an appointment. .     -Chronic low back pain from new L5 met status post ablation and kyphoplasty, new and progressive right hip pain from sacral met  -This pain is fairly well controlled with the changes last time  -Continue methadone  20 mg in the morning and noon and 30 mg at bedtime  -You are back on hydromorphone despite it causing you hives because you did not think oxycodone was helpful and it also made you very sleepy. -Continue hydromorphone 8 mg every 3 hours as needed. He has not needed hydromorphone since increase in methadone and radiation to the hip.  -Add Advil 400 mg with breakfast lunch and dinner  -Apply lidocaine patch and Voltaren gel to your right hip which helps you the most.      -Chemo-induced diarrhea  -This is resolved. You are no longer on tincture of opium.    -Sjogren's disease  -You are very careful to stay well hydrated. You drink water and sugar-free gatorade or powerade.     Chemotherapy-induced nausea  -Please take Zofran every 4 hours as needed and Compazine 10 mg every 6 hours while you have the chemo pump on. You are going to the infusion center to have it removed tomorrow and I will arrange for you to have IV fluids along with some Zofran and Pepcid intravenously. -Fatigue  -Sometimes you cannot do what you want to do because of the tiredness of your legs.  You can get what you need to do done as long as you take breaks.    -Nausea  Your nausea has returned. I provided you with Compazine.    -Follow up  -W I will see you again in 4 weeks       reviewed and appropriate. Most recent CT scan reviewed and results discussed with patient. See results below in data portion of note.

## 2022-03-11 NOTE — PROGRESS NOTES
Pt arrived to [facility] ambulatory in no acute distress at 1430 for Hydration/Pump Removal.  Assessment unremarkable. R chest port flushed without issue and positive blood return noted. All contents of CADD infused. Patient denied having any symptoms of COVID-19, i.e. SOB, coughing, fever, or generally not feeling well. Also denies having been exposed to COVID-19 recently or having had any recent contact with family/friend that has a pending COVID test.    Visit Vitals  /69 (BP 1 Location: Left upper arm, BP Patient Position: Sitting)   Pulse 76   Temp 98.1 °F (36.7 °C)   Resp 18   SpO2 97%       The following medications administered:  Medications Administered     famotidine (PF) (PEPCID) 20 mg in 0.9% sodium chloride 10 mL injection     Admin Date  03/11/2022 Action  Given Dose  20 mg Route  IntraVENous Administered By  Shade Blevins RN          heparin (porcine) pf 300-500 Units     Admin Date  03/11/2022 Action  Given Dose  500 Units Route  InterCATHeter Administered By  Shade Blevins RN          ondansetron [facility]) injection 4 mg     Admin Date  03/11/2022 Action  Given Dose  4 mg Route  IntraVENous Administered By  Shade Blevins RN          sodium chloride (NS) flush 10 mL     Admin Date  03/11/2022 Action  Given Dose  10 mL Route  IntraVENous Administered By  Shade Blevins RN          sodium chloride 0.9 % bolus infusion 1,000 mL     Admin Date  03/11/2022 Action  New Bag Dose  1,000 mL Rate  1,000 mL/hr Route  IntraVENous Administered By  Shade Blevins RN                Pt tolerated treatment well. Port flushed per policy and de-accessed, 2x2 and tape placed.  Pt discharged ambulatory in no acute distress at 1535, accompanied by self. Next appointment 3/23/22 at 0900.

## 2022-03-11 NOTE — TELEPHONE ENCOUNTER
[address] [facility], [address] [facility]   W: [phone]  F: [phone]      Medical Nutrition Therapy  Nutrition Encounter:    Called and spoke with patient. Patient with extrahepatic cholangiocarcinoma with mets. He reports he is eating better. He reports some nausea, will start taking compazine. He reports he is eating but unable to eat the quantity he usually does. Discussed strategies:   · Eating small amounts several times a day verses large meals. · Add protein and calories to favorite foods  (Ex. adding non-fat dry milk powder, butters, oils, whole milk, etc)  · Keep nutrient-dense foods close at hand and snack frequently   · Discussed consumption of nutrition supplements per day   · Ideas to make more calorically dense without increasing volume to be sent via [employer]. Provided contact information and encouraged him to reach out for any nutrition related questions or concerns.         Chemotherapy Flowsheet 3/9/2022   Cycle C1   Date 3/9/2022   Drug / Regimen Folfox   Pre Hydration -   Post Hydration Xgeva given   Pre Meds given   Notes given     Wt Readings from Last 5 Encounters:   03/09/22 148 lb 1.6 oz (67.2 kg)   03/09/22 148 lb (67.1 kg)   03/07/22 147 lb (66.7 kg)   02/24/22 144 lb (65.3 kg)   02/23/22 145 lb 9.6 oz (66 kg)         Signed By: Leonor Villalobos, [address], [address] [facility] [address]

## 2022-03-15 NOTE — LETTER
3/16/2022    Patient: Amina Lou. YOB: 1955   Date of Visit: 3/15/2022     Erika Haley III, DO  [address] [facility] [address]  [facility]  [address] [facility] [address]    Dear Maeve Rose,      Thank you for referring Mr. Indira Hayden to [facility] for evaluation. My notes for this consultation are attached. If you have questions, please do not hesitate to call me. I look forward to following your patient along with you.       Sincerely,    Carmen Ernst MD

## 2022-03-15 NOTE — PROGRESS NOTES
1. Have you been to the ER, urgent care clinic since your last visit? Hospitalized since your last visit? No    2. Have you seen or consulted any other health care providers outside of the [address] [place] [address] since your last visit? Include any pap smears or colon screening.  No

## 2022-03-16 PROBLEM — K40.91 RECURRENT RIGHT INGUINAL HERNIA: Status: ACTIVE | Noted: 2020-12-11

## 2022-03-16 NOTE — PROGRESS NOTES
[facility]      Clinic Note - Follow up    [address] returns for follow up today. 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 He is s/p a pylorus preserving whipple procedure on 10/6/17, final pathology [id number]. He completed adjuvant therapy after this. He has had issues with an anastomotic G-J stricture and more recently has had recurrent pancreatitis. He had an ERP with pancreatic stent placement which was repeated on 4/1/20. He has also had stereotactic radiation therapy for localized recurrence of his cholangiocarcinoma along the retroperitoneum. The patient is now status post revision of his duodenojejunostomy to a George-en-Y reconstruction due to ongoing chronic bilious vomiting which was performed on 9/9/2020. He had a right inguinal hernia repaired via open approach with mesh on 12/11/2020. The patient returns today with complaints of possible right recurrent inguinal hernia that is causing pain. The patient is on palliative chemotherapy for metastatic cholangiocarcinoma. He also had radiation to his spine for spinal metastases which has markedly improved his back pain. His main complaint currently is pain in the right inguinal region. This is worst when trying to have bowel movements. He states that his bowel movements are usually soft but he occasionally gets constipated. He uses stool softeners and MiraLAX as needed. He has not had any incarceration or obstructive symptoms. He has had some occasional vomiting that he describes as bilious. He is interested in having his hernia fixed even if this means taking a break on his chemotherapy for better quality of life.     Objective     Visit Vitals  BP 98/60 (BP 1 Location: Left arm, BP Patient Position: Sitting, BP Cuff Size: Large adult)   Pulse 71   Temp 98.7 °F (37.1 °C) (Oral)   Resp 18   Ht 5' 8\" (1.727 m)   Wt 140 lb 8 oz (63.7 kg)   SpO2 96%   BMI 21.36 kg/m² PE  GEN - Awake, alert, communicating appropriately. NAD, cachectic. Pulm - CTAB  CV - RRR  Abd - soft, NT, ND. There is a small bulge with Valsalva in the right inguinal region. This spontaneously reduces. Tenderness in palpation in that area. No obvious left inguinal hernia. No palpable masses. Ext - warm, well perfused, no edema. All other systems negative unless indicated above. Labs  Lab Results   Component Value Date/Time    WBC 3.2 (L) 03/09/2022 09:21 AM    Hemoglobin (POC) 10.3 (L) 10/06/2017 01:14 PM    HGB 9.2 (L) 03/09/2022 09:21 AM    Hematocrit (POC) 28 (L) 05/13/2021 09:16 AM    HCT 29.3 (L) 03/09/2022 09:21 AM    PLATELET 88 (L) 86/20/2293 09:21 AM    MCV 89.9 03/09/2022 09:21 AM     Lab Results   Component Value Date/Time    Sodium 134 (L) 03/09/2022 09:21 AM    Potassium 3.7 03/09/2022 09:21 AM    Chloride 101 03/09/2022 09:21 AM    CO2 29 03/09/2022 09:21 AM    Anion gap 4 (L) 03/09/2022 09:21 AM    Glucose 288 (H) 03/09/2022 09:21 AM    BUN 15 03/09/2022 09:21 AM    Creatinine 1.08 03/09/2022 09:21 AM    BUN/Creatinine ratio 14 03/09/2022 09:21 AM    GFR est AA >60 03/09/2022 09:21 AM    GFR est non-AA >60 03/09/2022 09:21 AM    Calcium 8.3 (L) 03/09/2022 09:21 AM    Bilirubin, total 0.8 03/09/2022 09:21 AM    Alk. phosphatase 173 (H) 03/09/2022 09:21 AM    Protein, total 5.7 (L) 03/09/2022 09:21 AM    Albumin 2.9 (L) 03/09/2022 09:21 AM    Globulin 2.8 03/09/2022 09:21 AM    A-G Ratio 1.0 (L) 03/09/2022 09:21 AM    ALT (SGPT) 15 03/09/2022 09:21 AM    AST (SGOT) 7 (L) 03/09/2022 09:21 AM         Imaging  CT Results (most recent):  Results from Hospital Encounter encounter on 02/21/22    CT ABD PELV W CONT    Narrative  INDICATION: restaging  cholangiocarcinoma    COMPARISON: November 12, 2021    TECHNIQUE:  Following the uneventful intravenous administration of 100 cc  Isovue-300, 5 mm axial images were obtained through the chest, abdomen and  pelvis.  Coronal and sagittal reformats were generated. CT dose reduction was  achieved through use of a standardized protocol tailored for this examination  and automatic exposure control for dose modulation. Oral contrast was  administered. This improves bowel recognition and improves diagnosis. FINDINGS:    CHEST WALL: No mass or axillary lymphadenopathy. Infusion catheter in place. THYROID: No nodule. MEDIASTINUM: New adenopathy, largest one measure 30 m x 15 mm, infracarinal.  ANGEL: No mass or lymphadenopathy. THORACIC AORTA: No dissection or aneurysm. MAIN PULMONARY ARTERY: Normal in caliber. TRACHEA/BRONCHI: Patent. ESOPHAGUS: No wall thickening or dilatation. HEART: Normal in size. PLEURA: New bilateral pleural effusions, mild sized on the right mild to  moderate size on the left. LUNGS: Increase in size and number of multiple lung metastases. A right upper  lobe nodule measures at this time 11 mm, it was 6 Seri 3 image number 30. Liver and spleen remain stable in size. Defect within the spleen compatible with  new infarct. New lesions in the liver compatible with liver metastases. Retroperitoneal infiltrating adenopathy and metastases have also progressed  encasing celiac and SMA vessel which appear to be narrowed. Patient had a prior  Whipple procedure. Portal vein is large but appears to be patent. Splenic  varices with diffuse encasement also demonstrated. Left side hydronephrosis once  again seen. Developing hydronephrosis also seen on the right. There is no bowel obstruction free air or free fluid. The prostate remains  prominent. There is mild anasarca. New sclerotic bone metastasis also  demonstrated, one is at L2 in the vertebral body. Alignment remains  satisfactory. Impression  1. Significant progression of disease in the chest abdomen and pelvis as above. 2. Bilateral pleural effusion, mediastinal adenopathy and increase in number and  size of lung metastases.   3. Liver metastases, splenic infarcts encasement of retroperitoneal vessel with  diffuse progression of retroperitoneal metastases. 4. Progression of bone disease. Cami Man is a 73 y. o.yr old male with progressive metastatic cholangiocarcinoma. He now has a recurrent right inguinal hernia that is symptomatic. He would like this fixed. Plan     Given he had a previous open right repair, I have recommended a laparoscopic approach for repair. We discussed that he may continue to have pain after repair, but hopefully this will not be the case. I would like him to be off chemotherapy for at least 2 weeks prior to surgery and he will need to stay off chemotherapy for 2 weeks after surgery to allow his incisions to heal.  I discussed this with his Oncologist who agreed with. I will set him up for surgery in the near future. 30 mins of time was spent with the patient of which > 50% of the time involved face-to-face counseling of the patient regarding the proposed treatment plan.       Elyse Espinosa MD  3/15/2022    CC: Pro Lo

## 2022-03-18 PROBLEM — C78.00 CHOLANGIOCARCINOMA METASTATIC TO LUNG (HCC): Status: ACTIVE | Noted: 2022-01-01

## 2022-03-18 PROBLEM — C22.1 CHOLANGIOCARCINOMA METASTATIC TO LUNG (HCC): Status: ACTIVE | Noted: 2022-01-01

## 2022-03-18 PROBLEM — F11.99 OPIOID USE, UNSPECIFIED WITH UNSPECIFIED OPIOID-INDUCED DISORDER (HCC): Status: ACTIVE | Noted: 2021-01-01

## 2022-03-18 PROBLEM — C22.1 CHOLANGIOCARCINOMA OF BILIARY TRACT (HCC): Status: ACTIVE | Noted: 2017-09-11

## 2022-03-18 NOTE — TELEPHONE ENCOUNTER
----- Message from Fawn Merrill. Napoleon Granados. sent at 3/18/2022 12:39 PM EDT -----  Regarding: Carlos Zeng,  I need a refill for the Methadone. I use [facility] [facility] in [place]. [address] [facility], [address] [facility]  [phone].   Thank you,  Marlene Craft brother brother and sister in law in a private home-pt resides in basement apt- 4 steps to enter and 1 flight to apt

## 2022-03-18 NOTE — TELEPHONE ENCOUNTER
Triage for Controlled Substance Refill Request    Pain Diagnosis: _Cholangiocarcinoma of biliary tract ([facility] [address] (C22.1)    Last Outpatient Visit: _3/10/2022    Next Outpatient Visit: _4/5/2022    Reason for refill needed outside of office visit? -Appointment not scheduled prior to need for scheduled refill      Pharmacy: [facility] [address], [place] - [address] AT [address] (DOLOPHINE) 10 mg      Dose and directions: Take 2 Tablets by mouth two (2) times a day AND 3 Tablets nightly  Number dispensed:210  Date filled ( or Pharmacy):2/18/2022  #left:     reviewed: _yes    Date of Urine Drug Screen:  _n/a    Opioid Safety Handout given:  _yes    Appropriate for refill:  _yes    Action:  _ please refill

## 2022-03-19 PROBLEM — Z98.890 S/P ABLATION OF ATRIAL FIBRILLATION: Status: ACTIVE | Noted: 2017-10-26

## 2022-03-19 PROBLEM — I48.0 PAROXYSMAL ATRIAL FIBRILLATION (HCC): Status: ACTIVE | Noted: 2017-10-26

## 2022-03-19 PROBLEM — R25.2 CRAMPS, MUSCLE, GENERAL: Status: ACTIVE | Noted: 2017-10-26

## 2022-03-19 PROBLEM — D72.829 LEUKOCYTOSIS: Status: ACTIVE | Noted: 2018-08-16

## 2022-03-19 PROBLEM — R10.9 INTRACTABLE ABDOMINAL PAIN: Status: ACTIVE | Noted: 2020-12-29

## 2022-03-19 PROBLEM — R11.2 NAUSEA & VOMITING: Status: ACTIVE | Noted: 2020-09-02

## 2022-03-19 PROBLEM — Z86.79 S/P ABLATION OF ATRIAL FIBRILLATION: Status: ACTIVE | Noted: 2017-10-26

## 2022-03-19 PROBLEM — R79.89 LOW VITAMIN D LEVEL: Status: ACTIVE | Noted: 2017-10-26

## 2022-03-19 PROBLEM — R11.10 VOMITING: Status: ACTIVE | Noted: 2018-07-05

## 2022-03-19 PROBLEM — K83.1 COMMON BILE DUCT (CBD) OBSTRUCTION: Status: ACTIVE | Noted: 2017-07-25

## 2022-03-19 PROBLEM — C24.9: Status: ACTIVE | Noted: 2017-10-06

## 2022-03-19 PROBLEM — N39.0 UTI (URINARY TRACT INFECTION): Status: ACTIVE | Noted: 2017-07-25

## 2022-03-19 PROBLEM — R10.9 ABDOMINAL PAIN: Status: ACTIVE | Noted: 2019-12-31

## 2022-03-19 PROBLEM — K83.1 OBSTRUCTIVE JAUNDICE: Status: ACTIVE | Noted: 2017-07-23

## 2022-03-19 PROBLEM — I10 ESSENTIAL HYPERTENSION: Status: ACTIVE | Noted: 2017-10-26

## 2022-03-19 PROBLEM — K40.91 RECURRENT RIGHT INGUINAL HERNIA: Status: ACTIVE | Noted: 2020-12-11

## 2022-03-19 PROBLEM — E11.22 TYPE 2 DIABETES MELLITUS WITH CHRONIC KIDNEY DISEASE (HCC): Status: ACTIVE | Noted: 2022-01-01

## 2022-03-20 PROBLEM — K85.90 ACUTE PANCREATITIS: Status: ACTIVE | Noted: 2018-08-16

## 2022-03-20 PROBLEM — E53.8 LOW VITAMIN B12 LEVEL: Status: ACTIVE | Noted: 2017-10-26

## 2022-03-20 PROBLEM — E11.9 CONTROLLED TYPE 2 DIABETES MELLITUS WITHOUT COMPLICATION, WITHOUT LONG-TERM CURRENT USE OF INSULIN (HCC): Status: ACTIVE | Noted: 2018-07-30

## 2022-03-20 PROBLEM — R79.89 LOW VITAMIN B12 LEVEL: Status: ACTIVE | Noted: 2017-10-26

## 2022-03-20 PROBLEM — K85.90 PANCREATITIS: Status: ACTIVE | Noted: 2019-11-17

## 2022-03-22 NOTE — PROGRESS NOTES
2001 [facility] [address], [address] [facility], 45 [facility], [address] [place] [address]  [phone]    Follow-up Note      Patient: Nohemy Quintana MRN: [id number]  SSN: [id number]    YOB: 1955  Age: 73 y.o. Sex: male        Diagnosis:     1. 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 S/P Pancreatoduodenectomy on  10/06/2017  2. Adjuvant monotherapy with Capecitabine - s/p 6 cycles   (12/4/2017 - 05/2018)  3. SBRT RP node/soft tissue 04/2020  4. Palliative chemotherapy   Cis/[place]/Abraxane - s/p 6 cycles - discontinued d/t progression of disease               mFOLFOX cycle 2 day 1     Subjective:      Nohemy Quintana is a 73 y.o. male with a diagnosis of extrahepatic cholangiocarcinoma with metastatic to the lungs, bones and retroperitoneal node/soft tissue. He presented to the ED in August 2017 with obstructive jaundice. He was found to have an obstructive lesion in the dital bile duct. The CT showed marked intrahepatic biliary dilation and common bile duct dilation to the level of the mid common bile duct, which ws markedly narrowed. He underwent a stenting procedure followed by spyglass cholangiogram. The brushings revealed a diagnosis of cholangiocarcinoma. He underwent a Whipple procedure on 10/06/2017. He completed 6 cycles of adjuvant Xeloda. PET scan showed increased activity in the soft tissue along the SMA. CT guided biopsy showed local recurrence. He underwent SBRT by Dr. Teddie Barthel in April 2020. He was admitted with severe back pain. Repeat CT shows growth of tumor in the L4/L5, lung and RP node/soft tissue. He underwent a CT guided celiac plexus block which has helped the back pain immensely.  A biopsy of the L4 vertebrae confirms a diagnosis of metastatic cholangiocarcinoma. Mr. Alyssia Haro received palliative chemotherapy. He has been off of treatment since the end of May 2021. Imaging showed slow progression of disease for some time. More recent scan revealed substantial progression of disease in the lung, liver and bones. Back pain is manageable. Although he Is experiencing discomfort in the right inguinal area. He returns for the second cycle of systemic chemotherapy. His appetite is poor and is weak. Review of Systems:    Constitutional: fatigue  Eyes: negative  Ears, Nose, Mouth, Throat, and Face: negative  Respiratory: negative  Cardiovascular: negative  Gastrointestinal: anorexia,   Genitourinary:negative  Integument/Breast: negative  Hematologic/Lymphatic: negative  Musculoskeletal: low back pain  Neurological: numbness/tingling B/L feet    Review of systems was reviewed and updated as needed on 03/23/22. Past Medical History:   Diagnosis Date    Aneurysm [facility]) 2008    CEREBRAL    Arrhythmia     Previous a.fib, ablation, NSR now; NO LONGER FOLLOWED BY CARDIOLOGIST.     Autoimmune disease ([address]     SJOGREN'S    Calculus of kidney 2018    Cancer ([address] 2020    COMMON BILE DUCT, ADENOCARCINOMA, SPINE    Diabetes ([address]     NIDDM    Family history of skin cancer     Hypertension     Sepsis ([address] 2017    STENTING TO BILE DUCT    Status post chemotherapy     Stroke [facility]) 2008    brain aneurysm - no deficits    Sun-damaged skin     Sunburn, blistering      Past Surgical History:   Procedure Laterality Date    HX CHOLECYSTECTOMY      HX COLONOSCOPY  6/4/18    HX GI      COLONOSCOPY, POLYPS (BENIGN)    HX GI  10/06/2017    Whipple Dr. Fidelina Black [facility]     HX GI  2020    WHIPPLE REVISION    HX HEART CATHETERIZATION  2005    Ablation     HX HERNIA REPAIR  12/2020    HERNIA REPAIR    HX ORTHOPAEDIC  2000    Spinal fusion W/ HARDWARE    HX OTHER SURGICAL  11/07/2017    [place] Cath, Dr. Jm Mo HX OTHER SURGICAL  01/11/2021    RIGHT IJ PORT-A-CATH PLACEMENT     HX VASCULAR ACCESS  2017    PORTACATH - then removed    IR ABLATE BONE TUMOR PERC W CRYO  12/23/2021    IR KYPHOPLASTY LUMBAR  1/12/2021    NEUROLOGICAL PROCEDURE UNLISTED  2005    Ting hole washout from cerebral hemorrhage    TX ABDOMEN SURGERY PROC UNLISTED  10/2017    APRIL      Family History   Problem Relation Age of Onset    Cancer Father         Breast and Colon, MELANOMA    Hypertension Father     Diabetes Father     Cancer Mother         LEUKEMIA    No Known Problems Sister     Atrial Fibrillation Brother     No Known Problems Sister     No Known Problems Son     No Known Problems Son     Anesth Problems Neg Hx      Social History     Tobacco Use    Smoking status: Never Smoker    Smokeless tobacco: Never Used   Substance Use Topics    Alcohol use: No     Comment: very rare      Prior to Admission medications    Medication Sig Start Date End Date Taking? Authorizing Provider   methadone (DOLOPHINE) 10 mg tablet Take 2 Tablets by mouth two (2) times a day AND 3 Tablets nightly. Do all this for 30 days. Max Daily Amount: 70 mg. 3/19/22 4/18/22 Yes Sarita Burch MD   glipiZIDE (GLUCOTROL) 5 mg tablet TAKE 1 TABLET BY MOUTH DAILY WITH BREAKFAST 3/7/22  Yes Vee Malone MD   clindamycin (CLEOCIN) 300 mg capsule Take 300 mg by mouth daily. 12/10/21  Yes Provider, Historical   phenylephrine hcl 10 mg tab Take 10 mg by mouth daily. Yes Provider, Historical   prochlorperazine (COMPAZINE) 10 mg tablet Take 10 mg by mouth every six (6) hours as needed. Yes Provider, Historical   diphenhydrAMINE (BENADRYL) 25 mg tablet Take 1 Tablet by mouth nightly as needed. Yes Provider, Historical   metFORMIN (GLUCOPHAGE) 500 mg tablet Take 1 Tablet by mouth daily. Take with food. Patient taking differently: Take 500 mg by mouth two (2) times daily (with meals). Take with food.  2/23/22  Yes Mery Raya III, DO   furosemide (Lasix) 40 mg tablet Take 1 Tablet by mouth every Monday, Wednesday, Friday. 2/23/22  Yes Silas Raya III, DO   diclofenac (VOLTAREN) 1 % gel Apply  to affected area four (4) times daily. 2/10/22  Yes Francia Rowan MD   lidocaine (LIDODERM) 5 % 1 Patch by TransDERmal route every twenty-four (24) hours. Apply patch to the affected area for 12 hours a day and remove for 12 hours a day. 1/13/22  Yes Francia Rowan MD   ferrous sulfate 325 mg (65 mg iron) tablet Take 325 mg by mouth Daily (before breakfast). Yes Provider, Historical   dutasteride (AVODART) 0.5 mg capsule TAKE ONE CAPSULE BY MOUTH DAILY 12/13/21  Yes Silas Raya III, DO   tamsulosin (FLOMAX) 0.4 mg capsule TAKE 1 CAPSULE BY MOUTH ONCE EVERY EVENING 12/13/21  Yes Silas Raya III, DO   dexAMETHasone (DECADRON) 4 mg tablet Take 4 mg by mouth daily (with breakfast). 12/3/21  Yes Onesimo Coronado MD   diphenoxylate-atropine (LomotiL) 2.5-0.025 mg per tablet Take 1 Tablet by mouth four (4) times daily as needed for Diarrhea. Max Daily Amount: 4 Tablets. 10/28/21  Yes Shantelle Prescott MD   ondansetron (Zofran ODT) 4 mg disintegrating tablet Take 1 Tablet by mouth every eight (8) hours as needed for Nausea. 10/15/21  Yes Jovon Caruso MD   Creon 36,000-114,000- 180,000 unit cpDR capsule TAKE TWO TO THREE CAPSULES BY MOUTH WITH EACH MEAL AND ONE CAPSULE FOR SNACKS DAILY 10/14/21  Yes Kelly Valdez,    colestipoL (COLESTID) 1 gram tablet  9/27/21  Yes Provider, Historical   cyanocobalamin (VITAMIN B12) 1,000 mcg/mL injection 1 mL by IntraMUSCular route every seven (7) days. 8/12/21  Yes Silas Raya III,    calcium carbonate (CALCIUM 500 PO) Take  by mouth. Yes Provider, Historical   vitamin A (AQUASOL A) 10,000 unit capsule TAKE 1 CAPSULE BY MOUTH ONE TIME A DAY 6/16/21  Yes Silas Raya III, DO   OLANZapine (ZyPREXA) 10 mg tablet Take 1 Tab by mouth See Admin Instructions.  Take nightly for 4 days starting the evening of chemotherapy. 1/29/21  Yes Jenelle Escalante MD   lidocaine-prilocaine (EMLA) topical cream Apply  to affected area as needed for Pain. 30-45 min prior to treatments 1/7/21  Yes Jenelle Escalante MD   naloxone [facility]) 4 mg/actuation nasal spray Use 1 spray intranasally, then discard. Repeat with new spray every 2 min as needed for opioid overdose symptoms, alternating nostrils. 12/11/20  Yes Margarita Rivera MD   aluminum & magnesium hydroxide-simethicone (Maalox Maximum Strength) 400-400-40 mg/5 mL suspension Take 10 mL by mouth every six (6) hours as needed for Indigestion. Yes Provider, Historical   vitamin E (AQUA GEMS) 400 unit capsule Take 1 Cap by mouth daily. 10/26/20  Yes Silas Raya III, DO   gabapentin (NEURONTIN) 100 mg capsule Take 3 capsules by mouth twice a day. 10/20/20  Yes Silas Raya III, DO   lidocaine (LIDODERM) 5 % 1 Patch by TransDERmal route every twenty-four (24) hours. Apply patch to the affected area for 12 hours a day and remove for 12 hours a day. 10/15/20  Yes Silas Raya III, DO   finasteride (PROSCAR) 5 mg tablet TAKE 1 TABLET BY MOUTH EVERY DAY 4/13/20  Yes Provider, Historical   acetaminophen (TYLENOL) 325 mg tablet Take 2 Tabs by mouth every four (4) hours as needed for Pain or Fever (Over the counter medication). 1/2/20  Yes Irish Marquez NP   alpha-D-galactosidase (BEANO PO) Take 1 Tab by mouth two (2) times a day. Yes Other, MD Flynn   cetirizine (ZYRTEC) 10 mg tablet Take 10 mg by mouth nightly. Yes Provider, Historical   potassium chloride (Klor-Con) 20 mEq pack Take 1 Packet by mouth daily.   Patient not taking: Reported on 3/7/2022 2/18/22   Ej Sim MD          Allergies   Allergen Reactions    Shellfish Derived Anaphylaxis     Soft shell crabs    Morphine Nausea and Vomiting    Pcn [Penicillins] Other (comments)     Pt stated \"always been told PCN\"  Pt tolerated other cephalosporins in the past           Objective:     Visit Vitals  /73   Pulse 72   Temp 97.9 °F (36.6 °C)   Resp 18   Ht 5' 8\" (1.727 m)   Wt 136 lb (61.7 kg)   SpO2 99%   BMI 20.68 kg/m²     Pain Scale: /10      Physical Exam:     GENERAL: alert, cooperative, no distress, appears stated age  EYE: negative  LYMPHATIC: Cervical, supraclavicular, and axillary nodes normal.   THROAT & NECK: normal and no erythema or exudates noted. LUNG: clear to auscultation bilaterally  HEART: irregularly, irregular rythm  ABDOMEN: soft, non-tender  EXTREMITIES: trace ankle edema  SKIN: negative  NEUROLOGIC: Numbness in fingertips and feet - mild    The above physical exam was reviewed and updated as needed on 03/23/22. CT Results (most recent):  Results from Hospital Encounter encounter on 02/21/22    CT ABD PELV W CONT    Narrative  INDICATION: restaging  cholangiocarcinoma    COMPARISON: November 12, 2021    TECHNIQUE:  Following the uneventful intravenous administration of 100 cc  Isovue-300, 5 mm axial images were obtained through the chest, abdomen and  pelvis. Coronal and sagittal reformats were generated. CT dose reduction was  achieved through use of a standardized protocol tailored for this examination  and automatic exposure control for dose modulation. Oral contrast was  administered. This improves bowel recognition and improves diagnosis. FINDINGS:    CHEST WALL: No mass or axillary lymphadenopathy. Infusion catheter in place. THYROID: No nodule. MEDIASTINUM: New adenopathy, largest one measure 30 m x 15 mm, infracarinal.  ANGEL: No mass or lymphadenopathy. THORACIC AORTA: No dissection or aneurysm. MAIN PULMONARY ARTERY: Normal in caliber. TRACHEA/BRONCHI: Patent. ESOPHAGUS: No wall thickening or dilatation. HEART: Normal in size. PLEURA: New bilateral pleural effusions, mild sized on the right mild to  moderate size on the left. LUNGS: Increase in size and number of multiple lung metastases.  A right upper  lobe nodule measures at this time 11 mm, it was 6 Seri 3 image number 30. Liver and spleen remain stable in size. Defect within the spleen compatible with  new infarct. New lesions in the liver compatible with liver metastases. Retroperitoneal infiltrating adenopathy and metastases have also progressed  encasing celiac and SMA vessel which appear to be narrowed. Patient had a prior  Whipple procedure. Portal vein is large but appears to be patent. Splenic  varices with diffuse encasement also demonstrated. Left side hydronephrosis once  again seen. Developing hydronephrosis also seen on the right. There is no bowel obstruction free air or free fluid. The prostate remains  prominent. There is mild anasarca. New sclerotic bone metastasis also  demonstrated, one is at L2 in the vertebral body. Alignment remains  satisfactory. Impression  1. Significant progression of disease in the chest abdomen and pelvis as above. 2. Bilateral pleural effusion, mediastinal adenopathy and increase in number and  size of lung metastases. 3. Liver metastases, splenic infarcts encasement of retroperitoneal vessel with  diffuse progression of retroperitoneal metastases. 4. Progression of bone disease.           Lab Results   Component Value Date/Time    WBC 2.5 (L) 03/23/2022 09:10 AM    Hemoglobin (POC) 10.3 (L) 10/06/2017 01:14 PM    HGB 9.6 (L) 03/23/2022 09:10 AM    Hematocrit (POC) 28 (L) 05/13/2021 09:16 AM    HCT 29.3 (L) 03/23/2022 09:10 AM    PLATELET 44 (LL) 37/10/8584 09:10 AM    MCV 88.8 03/23/2022 09:10 AM       Lab Results   Component Value Date/Time    Sodium 136 03/23/2022 09:10 AM    Potassium 2.8 (L) 03/23/2022 09:10 AM    Chloride 103 03/23/2022 09:10 AM    CO2 26 03/23/2022 09:10 AM    Anion gap 7 03/23/2022 09:10 AM    Glucose 241 (H) 03/23/2022 09:10 AM    BUN 14 03/23/2022 09:10 AM    Creatinine 1.05 03/23/2022 09:10 AM    BUN/Creatinine ratio 13 03/23/2022 09:10 AM    GFR est AA >60 03/23/2022 09:10 AM    GFR est non-AA >60 03/23/2022 09:10 AM    Calcium 7.4 (L) 03/23/2022 09:10 AM    Bilirubin, total 0.9 03/23/2022 09:10 AM    Alk. phosphatase 128 (H) 03/23/2022 09:10 AM    Protein, total 5.7 (L) 03/23/2022 09:10 AM    Albumin 3.2 (L) 03/23/2022 09:10 AM    Globulin 2.5 03/23/2022 09:10 AM    A-G Ratio 1.3 03/23/2022 09:10 AM    ALT (SGPT) 13 03/23/2022 09:10 AM    AST (SGOT) 9 (L) 03/23/2022 09:10 AM             Assessment:     1. 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 Local recurrence in the para-aortic soft tissue - S/P SBRT     Recent CT shows progression of disease in the retroperitoneum, L4, lungs  The disease is unresectable. Treatment will in a palliative intent with a goal to extend life. Receiving palliative chemotherapy   Cis/[place]/Abraxane - s/p 6 cycles - last cycle 5/31/2021    CT - small progressive lung nodules  Too small for NCI/NIH trial  Pain in the sacrum and pelvis is worse  Repeat CT - disease progression in abdominal nodes, liver, lungs and bones  Refer to Rad-Onc for palliative radiation. Due to progression of disease, I recommend starting mFOLFOX chemotherapy. Receiving mFOLFOX , 1 cycle  He has developed severe thrombocytopenia. He is loosing weight and not tolerating systemic chemotherapy. His performance status is declining. He is no longer a candidate for systemic chemotherapy. I recommend Hospice for end of life. 2. Cancer related pain     S/P celiac plexus block - done by Dr. Jorje Martel   Following with Palliative medicine. Pain meds      3.  Bone metastasis, L4/L5    S/P Ablation/Kyphoplasty - Dr. Parris Grady  On Denosumab      5. Chemotherapy related neuropathy    Stable  Grade I       6. Atrial fibrillation    Cardiology      7. Thrombocytopenia    Observation      8. Severe protein calorie malnutrition    Dietician consult  Protein supplements      Plan:     1. Hold mFOLFOX  2. Refer to Hospice care      Signed by: Alcon Hernandez NP                     March 23, 2022       I personally saw and evaluated the patient and performed the key components of medical decision making. The history, physical exam, and documentation were performed by Jayne Cooney NP. I reviewed and verified the above documentation and modified it as needed. Signed by: Doug Looney MD                     March 23, 2022        CC. Jani Starr MD  CC. Fer Ken MD  CC. Breana Bach MD  CC. Elwood Bernheim, MD  CC.  Josue Holbrook MD

## 2022-03-22 NOTE — PROGRESS NOTES
Mandy Constantino is a 73 y.o. male here for follow up for met cholangiocarcinoma. Treatment today:  Cycle 2 Day 1 FOLFOX 6 MOD  Pt is extremely fatigued. Pt has lost 4 lbs since last visit. He is having some ankle swelling. 1. Have you been to the ER, urgent care clinic since your last visit? Hospitalized since your last visit? no    2. Have you seen or consulted any other health care providers outside of the [address] [place] [address] since your last visit? Include any pap smears or colon screening.   no

## 2022-03-23 NOTE — LETTER
3/23/2022    Patient: Layla Lester. YOB: 1955   Date of Visit: 3/23/2022     Felisa Double III, DO  [address] [facility] [place] [address]  [facility]  [address] [facility] [address]    Dear Elsie Pulido,      Thank you for referring Mr. Marce Nelson to [address] [place] [address] for evaluation. My notes for this consultation are attached. If you have questions, please do not hesitate to call me. I look forward to following your patient along with you.       Sincerely,    Caridad Martínez, NP

## 2022-03-23 NOTE — PROGRESS NOTES
Pt arrived to [facility] ambulatory in no acute distress at 0900 for Folfox C2. Assessment completed; pt c/o SOB with exertion, nausea, vomiting, diarrhea, intermittent constipation, and generalized weakness/fatigue. Patient states fatigue has worsened since receiving C1 Folfox. R chest port accessed without issue and positive blood return noted. Labs obtained, CBC and CMP. Patient to MD office for apt. MD office notified of abnormal lab values- PLT:44, K:2.8, and Corrected Ca: 8.04. Verbal order from MD office received to HOLD treatment today and patient to receive 2 runs of IV Potassium and 1 run of IV Calcium. Patient denied having any symptoms of COVID-19, i.e. SOB, coughing, fever, or generally not feeling well. Also denies having been exposed to COVID-19 recently or having had any recent contact with family/friend that has a pending COVID test.    Patient Vitals for the past 12 hrs:   Temp Pulse Resp BP SpO2   03/23/22 1255  81 16 (!) 95/58    03/23/22 0906 97.9 °F (36.6 °C) 72 18 123/73 99 %     Recent Results (from the past 12 hour(s))   CBC WITH AUTOMATED DIFF    Collection Time: 03/23/22  9:10 AM   Result Value Ref Range    WBC 2.5 (L) 4.1 - 11.1 K/uL    RBC 3.30 (L) 4.10 - 5.70 M/uL    HGB 9.6 (L) 12.1 - 17.0 g/dL    HCT 29.3 (L) 36.6 - 50.3 %    MCV 88.8 80.0 - 99.0 FL    MCH 29.1 26.0 - 34.0 PG    MCHC 32.8 30.0 - 36.5 g/dL    RDW 17.1 (H) 11.5 - 14.5 %    PLATELET 44 (LL) 702 - 400 K/uL    MPV 11.0 8.9 - 12.9 FL    NRBC 0.0 0  WBC    ABSOLUTE NRBC 0.00 0.00 - 0.01 K/uL    NEUTROPHILS 71 32 - 75 %    LYMPHOCYTES 9 (L) 12 - 49 %    MONOCYTES 16 (H) 5 - 13 %    EOSINOPHILS 4 0 - 7 %    BASOPHILS 0 0 - 1 %    IMMATURE GRANULOCYTES 0 0.0 - 0.5 %    ABS. NEUTROPHILS 1.8 1.8 - 8.0 K/UL    ABS. LYMPHOCYTES 0.2 (L) 0.8 - 3.5 K/UL    ABS. MONOCYTES 0.4 0.0 - 1.0 K/UL    ABS. EOSINOPHILS 0.1 0.0 - 0.4 K/UL    ABS. BASOPHILS 0.0 0.0 - 0.1 K/UL    ABS. IMM.  GRANS. 0.0 0.00 - 0.04 K/UL    DF AUTOMATED      PLATELET COMMENTS DECREASED PLATELETS      RBC COMMENTS ANISOCYTOSIS  1+       METABOLIC PANEL, COMPREHENSIVE    Collection Time: 03/23/22  9:10 AM   Result Value Ref Range    Sodium 136 136 - 145 mmol/L    Potassium 2.8 (L) 3.5 - 5.1 mmol/L    Chloride 103 97 - 108 mmol/L    CO2 26 21 - 32 mmol/L    Anion gap 7 5 - 15 mmol/L    Glucose 241 (H) 65 - 100 mg/dL    BUN 14 6 - 20 MG/DL    Creatinine 1.05 0.70 - 1.30 MG/DL    BUN/Creatinine ratio 13 12 - 20      GFR est AA >60 >60 ml/min/1.73m2    GFR est non-AA >60 >60 ml/min/1.73m2    Calcium 7.4 (L) 8.5 - 10.1 MG/DL    Bilirubin, total 0.9 0.2 - 1.0 MG/DL    ALT (SGPT) 13 12 - 78 U/L    AST (SGOT) 9 (L) 15 - 37 U/L    Alk.  phosphatase 128 (H) 45 - 117 U/L    Protein, total 5.7 (L) 6.4 - 8.2 g/dL    Albumin 3.2 (L) 3.5 - 5.0 g/dL    Globulin 2.5 2.0 - 4.0 g/dL    A-G Ratio 1.3 1.1 - 2.2     MAGNESIUM    Collection Time: 03/23/22  9:10 AM   Result Value Ref Range    Magnesium 2.5 (H) 1.6 - 2.4 mg/dL       The following medications administered:  Medications Administered     0.9% sodium chloride injection 10 mL     Admin Date  03/23/2022 Action  Given Dose  10 mL Route  IntraVENous Administered By  Rylee Mondragon RN          calcium gluconate 2 g/100 mL sodium chloride (ISO-OSM)     Admin Date  03/23/2022 Action  New Bag Dose  2 g Rate  100 mL/hr Route  IntraVENous Administered By  Rylee Mondragon RN          heparin (porcine) pf 300-500 Units     Admin Date  03/23/2022 Action  Given Dose  500 Units Route  InterCATHeter Administered By  Rylee Mondragon RN          potassium chloride 10 mEq in 100 ml IVPB     Admin Date  03/23/2022 Action  New Bag Dose  10 mEq Rate  100 mL/hr Route  IntraVENous Administered By  Rylee Mondragon RN           Admin Date  03/23/2022 Action  New Bag Dose  10 mEq Rate  100 mL/hr Route  IntraVENous Administered By  Rylee Fresh, RN          sodium chloride (NS) flush 10 mL     Admin Date  03/23/2022 Action  Given Dose  10 mL Route  IntraVENous Administered By  Lisy Silverio RN                Pt tolerated treatment well. Port flushed per policy and de-accessed, 2x2 and bandaid placed. Pt discharged ambulatory in no acute distress at 1255, accompanied by self. Next appointment 3/25/22.

## 2022-03-23 NOTE — PROGRESS NOTES
[address] [facility]   Oncology Social Work  Encounter     Patient Name:  Mary Gabriel     Medical History:     Advance Directives:    Narrative: MD shared pt is receiving mildest chemo and his body is reacting to toxicity with low blood counts. PT has been losing weight and we are now showing much progress. Pt states if has surgery with Dr. Param Zabala he will be able to eat better. MD not sure the hernia is causing the loss of appetite or ability to eat. MD is suggesting wife and pt come back in for a conversation(Thursday and/or Friday) and to allow spouse to ask any questions she might have. MD states a short survival, body failing. Can't treat today. Hospice at this point is what MD is suggesting.      Barriers to Care:     Plan:    Referral/Handouts:   Hospice referral

## 2022-03-25 NOTE — PROGRESS NOTES
Greta Jack. is a 73 y.o. male here for follow up for met cholangiocarcinoma. 1. Have you been to the ER, urgent care clinic since your last visit? Hospitalized since your last visit? no    2. Have you seen or consulted any other health care providers outside of the [address] [place] [address] since your last visit? Include any pap smears or colon screening.   no

## 2022-03-25 NOTE — PROGRESS NOTES
[address] [facility] [address], [address] [facility], [address] [facility], [address] [facility]  [phone]    Follow-up Note      Patient: Flip Isaac MRN: [id number]  SSN: [id number]    YOB: 1955  Age: 73 y.o. Sex: male        Diagnosis:     1. 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 S/P Pancreatoduodenectomy on  10/06/2017  2. Adjuvant monotherapy with Capecitabine - s/p 6 cycles   (12/4/2017 - 05/2018)  3. SBRT RP node/soft tissue 04/2020  4. Palliative chemotherapy   Cis/[place]/Abraxane - s/p 6 cycles - discontinued d/t progression of disease               mFOLFOX 1 cycle    Subjective:      Flip Isaac is a 73 y.o. male with a diagnosis of extrahepatic cholangiocarcinoma with metastatic to the lungs, bones and retroperitoneal node/soft tissue. He presented to the ED in August 2017 with obstructive jaundice. He was found to have an obstructive lesion in the dital bile duct. The CT showed marked intrahepatic biliary dilation and common bile duct dilation to the level of the mid common bile duct, which ws markedly narrowed. He underwent a stenting procedure followed by spyglass cholangiogram. The brushings revealed a diagnosis of cholangiocarcinoma. He underwent a Whipple procedure on 10/06/2017. He completed 6 cycles of adjuvant Xeloda. PET scan showed increased activity in the soft tissue along the SMA. CT guided biopsy showed local recurrence. He underwent SBRT by Dr. Selina Simmons in April 2020. He was admitted with severe back pain. Repeat CT shows growth of tumor in the L4/L5, lung and RP node/soft tissue. He underwent a CT guided celiac plexus block which has helped the back pain immensely.  A biopsy of the L4 vertebrae confirms a diagnosis of metastatic cholangiocarcinoma. Mr. Anayeli Castorena received palliative chemotherapy. He has been off of treatment since the end of May 2021. Imaging showed slow progression of disease for some time. More recent scan revealed substantial progression of disease in the lung, liver and bones. Back pain is manageable. Although he Is experiencing discomfort in the right inguinal area. He returns for the second cycle of systemic chemotherapy. His appetite is poor and is weak. Review of Systems:    Constitutional: fatigue  Eyes: negative  Ears, Nose, Mouth, Throat, and Face: negative  Respiratory: negative  Cardiovascular: negative  Gastrointestinal: anorexia,   Genitourinary:negative  Integument/Breast: negative  Hematologic/Lymphatic: negative  Musculoskeletal: low back pain  Neurological: numbness/tingling B/L feet    Review of systems was reviewed and updated as needed on 03/25/22. Past Medical History:   Diagnosis Date    Aneurysm [facility]) 2008    CEREBRAL    Arrhythmia     Previous a.fib, ablation, NSR now; NO LONGER FOLLOWED BY CARDIOLOGIST.     Autoimmune disease ([address]     SJOGREN'S    Calculus of kidney 2018    Cancer ([address] 2020    COMMON BILE DUCT, ADENOCARCINOMA, SPINE    Diabetes ([address]     NIDDM    Family history of skin cancer     Hypertension     Sepsis ([address] 2017    STENTING TO BILE DUCT    Status post chemotherapy     Stroke [facility]) 2008    brain aneurysm - no deficits    Sun-damaged skin     Sunburn, blistering      Past Surgical History:   Procedure Laterality Date    HX CHOLECYSTECTOMY      HX COLONOSCOPY  6/4/18    HX GI      COLONOSCOPY, POLYPS (BENIGN)    HX GI  10/06/2017    Whipple Dr. [facility] [place]     HX GI  2020    WHIPPLE REVISION    HX HEART CATHETERIZATION  2005    Ablation     HX HERNIA REPAIR  12/2020    HERNIA REPAIR    HX ORTHOPAEDIC  2000    Spinal fusion W/ HARDWARE    HX OTHER SURGICAL  11/07/2017    [place] Cath, Dr. Mary Colby HX OTHER SURGICAL  01/11/2021    RIGHT IJ PORT-A-CATH PLACEMENT     HX VASCULAR ACCESS  2017    PORTACATH - then removed    IR ABLATE BONE TUMOR PERC W CRYO  12/23/2021    IR KYPHOPLASTY LUMBAR  1/12/2021    NEUROLOGICAL PROCEDURE UNLISTED  2005    Ting hole washout from cerebral hemorrhage    MI ABDOMEN SURGERY PROC UNLISTED  10/2017    APRIL      Family History   Problem Relation Age of Onset    Cancer Father         Breast and Colon, MELANOMA    Hypertension Father     Diabetes Father     Cancer Mother         LEUKEMIA    No Known Problems Sister     Atrial Fibrillation Brother     No Known Problems Sister     No Known Problems Son     No Known Problems Son     Anesth Problems Neg Hx      Social History     Tobacco Use    Smoking status: Never Smoker    Smokeless tobacco: Never Used   Substance Use Topics    Alcohol use: No     Comment: very rare      Prior to Admission medications    Medication Sig Start Date End Date Taking? Authorizing Provider   methadone (DOLOPHINE) 10 mg tablet Take 2 Tablets by mouth two (2) times a day AND 3 Tablets nightly. Do all this for 30 days. Max Daily Amount: 70 mg. 3/19/22 4/18/22 Yes Cristopher Moore MD   glipiZIDE (GLUCOTROL) 5 mg tablet TAKE 1 TABLET BY MOUTH DAILY WITH BREAKFAST 3/7/22  Yes Carolina Quintana MD   clindamycin (CLEOCIN) 300 mg capsule Take 300 mg by mouth daily. 12/10/21  Yes Provider, Historical   phenylephrine hcl 10 mg tab Take 10 mg by mouth daily. Yes Provider, Historical   prochlorperazine (COMPAZINE) 10 mg tablet Take 10 mg by mouth every six (6) hours as needed. Yes Provider, Historical   diphenhydrAMINE (BENADRYL) 25 mg tablet Take 1 Tablet by mouth nightly as needed. Yes Provider, Historical   metFORMIN (GLUCOPHAGE) 500 mg tablet Take 1 Tablet by mouth daily. Take with food. Patient taking differently: Take 500 mg by mouth two (2) times daily (with meals). Take with food.  2/23/22  Yes Zamzam Raya III, DO   furosemide (Lasix) 40 mg tablet Take 1 Tablet by mouth every Monday, Wednesday, Friday. 2/23/22  Yes Silas Raya III, DO   diclofenac (VOLTAREN) 1 % gel Apply  to affected area four (4) times daily. 2/10/22  Yes Olman Parish MD   lidocaine (LIDODERM) 5 % 1 Patch by TransDERmal route every twenty-four (24) hours. Apply patch to the affected area for 12 hours a day and remove for 12 hours a day. 1/13/22  Yes Olman Parish MD   ferrous sulfate 325 mg (65 mg iron) tablet Take 325 mg by mouth Daily (before breakfast). Yes Provider, Historical   dutasteride (AVODART) 0.5 mg capsule TAKE ONE CAPSULE BY MOUTH DAILY 12/13/21  Yes Silas Raya III, DO   tamsulosin (FLOMAX) 0.4 mg capsule TAKE 1 CAPSULE BY MOUTH ONCE EVERY EVENING 12/13/21  Yes Silas Raya III, DO   dexAMETHasone (DECADRON) 4 mg tablet Take 4 mg by mouth daily (with breakfast). 12/3/21  Yes Mahi Tiwari MD   diphenoxylate-atropine (LomotiL) 2.5-0.025 mg per tablet Take 1 Tablet by mouth four (4) times daily as needed for Diarrhea. Max Daily Amount: 4 Tablets. 10/28/21  Yes Deniz Alexander MD   ondansetron (Zofran ODT) 4 mg disintegrating tablet Take 1 Tablet by mouth every eight (8) hours as needed for Nausea. 10/15/21  Yes Vahid Russo MD   Creon 36,000-114,000- 180,000 unit cpDR capsule TAKE TWO TO THREE CAPSULES BY MOUTH WITH EACH MEAL AND ONE CAPSULE FOR SNACKS DAILY 10/14/21  Yes Krunal De La Paz,    colestipoL (COLESTID) 1 gram tablet  9/27/21  Yes Provider, Historical   cyanocobalamin (VITAMIN B12) 1,000 mcg/mL injection 1 mL by IntraMUSCular route every seven (7) days. 8/12/21  Yes Silas Raya III, DO   calcium carbonate (CALCIUM 500 PO) Take  by mouth. Yes Provider, Historical   vitamin A (AQUASOL A) 10,000 unit capsule TAKE 1 CAPSULE BY MOUTH ONE TIME A DAY 6/16/21  Yes Silas Raya III, DO   OLANZapine (ZyPREXA) 10 mg tablet Take 1 Tab by mouth See Admin Instructions.  Take nightly for 4 days starting the evening of chemotherapy. 1/29/21  Yes Inga Cota MD   lidocaine-prilocaine (EMLA) topical cream Apply  to affected area as needed for Pain. 30-45 min prior to treatments 1/7/21  Yes Inga Cota MD   naloxone [facility]) 4 mg/actuation nasal spray Use 1 spray intranasally, then discard. Repeat with new spray every 2 min as needed for opioid overdose symptoms, alternating nostrils. 12/11/20  Yes Dalton Pichardo MD   aluminum & magnesium hydroxide-simethicone (Maalox Maximum Strength) 400-400-40 mg/5 mL suspension Take 10 mL by mouth every six (6) hours as needed for Indigestion. Yes Provider, Historical   vitamin E (AQUA GEMS) 400 unit capsule Take 1 Cap by mouth daily. 10/26/20  Yes Silas Raya III, DO   gabapentin (NEURONTIN) 100 mg capsule Take 3 capsules by mouth twice a day. 10/20/20  Yes Silas Raya III, DO   lidocaine (LIDODERM) 5 % 1 Patch by TransDERmal route every twenty-four (24) hours. Apply patch to the affected area for 12 hours a day and remove for 12 hours a day. 10/15/20  Yes Silas Raya III, DO   finasteride (PROSCAR) 5 mg tablet TAKE 1 TABLET BY MOUTH EVERY DAY 4/13/20  Yes Provider, Historical   acetaminophen (TYLENOL) 325 mg tablet Take 2 Tabs by mouth every four (4) hours as needed for Pain or Fever (Over the counter medication). 1/2/20  Yes Jovan Gonzalez NP   alpha-D-galactosidase (BEANO PO) Take 1 Tab by mouth two (2) times a day. Yes Other, MD Flynn   cetirizine (ZYRTEC) 10 mg tablet Take 10 mg by mouth nightly. Yes Provider, Historical   potassium chloride (Klor-Con) 20 mEq pack Take 1 Packet by mouth daily.   Patient not taking: Reported on 3/7/2022 2/18/22   Savannah Ruiz MD          Allergies   Allergen Reactions    Shellfish Derived Anaphylaxis     Soft shell crabs    Morphine Nausea and Vomiting    Pcn [Penicillins] Other (comments)     Pt stated \"always been told PCN\"  Pt tolerated other cephalosporins in the past           Objective:     Visit Vitals  /65 (BP 1 Location: Left upper arm, BP Patient Position: Sitting)   Pulse 84   Temp 98 °F (36.7 °C) (Temporal)   Ht 5' 8\" (1.727 m)   SpO2 97%   BMI 20.68 kg/m²     Pain Scale: /10      Physical Exam:     GENERAL: alert, cooperative, no distress, appears stated age  EYE: negative  LYMPHATIC: Cervical, supraclavicular, and axillary nodes normal.   THROAT & NECK: normal and no erythema or exudates noted. LUNG: clear to auscultation bilaterally  HEART: irregularly, irregular rythm  ABDOMEN: soft, non-tender  EXTREMITIES: trace ankle edema  SKIN: negative  NEUROLOGIC: Numbness in fingertips and feet - mild    The above physical exam was reviewed and updated as needed on 03/25/22. CT Results (most recent):  Results from Hospital Encounter encounter on 02/21/22    CT ABD PELV W CONT    Narrative  INDICATION: restaging  cholangiocarcinoma    COMPARISON: November 12, 2021    TECHNIQUE:  Following the uneventful intravenous administration of 100 cc  Isovue-300, 5 mm axial images were obtained through the chest, abdomen and  pelvis. Coronal and sagittal reformats were generated. CT dose reduction was  achieved through use of a standardized protocol tailored for this examination  and automatic exposure control for dose modulation. Oral contrast was  administered. This improves bowel recognition and improves diagnosis. FINDINGS:    CHEST WALL: No mass or axillary lymphadenopathy. Infusion catheter in place. THYROID: No nodule. MEDIASTINUM: New adenopathy, largest one measure 30 m x 15 mm, infracarinal.  ANGEL: No mass or lymphadenopathy. THORACIC AORTA: No dissection or aneurysm. MAIN PULMONARY ARTERY: Normal in caliber. TRACHEA/BRONCHI: Patent. ESOPHAGUS: No wall thickening or dilatation. HEART: Normal in size. PLEURA: New bilateral pleural effusions, mild sized on the right mild to  moderate size on the left.   LUNGS: Increase in size and number of multiple lung metastases. A right upper  lobe nodule measures at this time 11 mm, it was 6 Seri 3 image number 30. Liver and spleen remain stable in size. Defect within the spleen compatible with  new infarct. New lesions in the liver compatible with liver metastases. Retroperitoneal infiltrating adenopathy and metastases have also progressed  encasing celiac and SMA vessel which appear to be narrowed. Patient had a prior  Whipple procedure. Portal vein is large but appears to be patent. Splenic  varices with diffuse encasement also demonstrated. Left side hydronephrosis once  again seen. Developing hydronephrosis also seen on the right. There is no bowel obstruction free air or free fluid. The prostate remains  prominent. There is mild anasarca. New sclerotic bone metastasis also  demonstrated, one is at L2 in the vertebral body. Alignment remains  satisfactory. Impression  1. Significant progression of disease in the chest abdomen and pelvis as above. 2. Bilateral pleural effusion, mediastinal adenopathy and increase in number and  size of lung metastases. 3. Liver metastases, splenic infarcts encasement of retroperitoneal vessel with  diffuse progression of retroperitoneal metastases. 4. Progression of bone disease.           Lab Results   Component Value Date/Time    WBC 2.5 (L) 03/23/2022 09:10 AM    Hemoglobin (POC) 10.3 (L) 10/06/2017 01:14 PM    HGB 9.6 (L) 03/23/2022 09:10 AM    Hematocrit (POC) 28 (L) 05/13/2021 09:16 AM    HCT 29.3 (L) 03/23/2022 09:10 AM    PLATELET 44 (LL) 83/28/7503 09:10 AM    MCV 88.8 03/23/2022 09:10 AM       Lab Results   Component Value Date/Time    Sodium 136 03/23/2022 09:10 AM    Potassium 2.8 (L) 03/23/2022 09:10 AM    Chloride 103 03/23/2022 09:10 AM    CO2 26 03/23/2022 09:10 AM    Anion gap 7 03/23/2022 09:10 AM    Glucose 241 (H) 03/23/2022 09:10 AM    BUN 14 03/23/2022 09:10 AM    Creatinine 1.05 03/23/2022 09:10 AM    BUN/Creatinine ratio 13 03/23/2022 09:10 AM    GFR est AA >60 03/23/2022 09:10 AM    GFR est non-AA >60 03/23/2022 09:10 AM    Calcium 7.4 (L) 03/23/2022 09:10 AM    Bilirubin, total 0.9 03/23/2022 09:10 AM    Alk. phosphatase 128 (H) 03/23/2022 09:10 AM    Protein, total 5.7 (L) 03/23/2022 09:10 AM    Albumin 3.2 (L) 03/23/2022 09:10 AM    Globulin 2.5 03/23/2022 09:10 AM    A-G Ratio 1.3 03/23/2022 09:10 AM    ALT (SGPT) 13 03/23/2022 09:10 AM    AST (SGOT) 9 (L) 03/23/2022 09:10 AM             Assessment:     1. 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 Local recurrence in the para-aortic soft tissue - S/P SBRT     Recent CT shows progression of disease in the retroperitoneum, L4, lungs  The disease is unresectable. Treatment will in a palliative intent with a goal to extend life. Receiving palliative chemotherapy   Cis/[place]/Abraxane - s/p 6 cycles - last cycle 5/31/2021    CT - small progressive lung nodules  Too small for NCI/NIH trial  Pain in the sacrum and pelvis is worse  Repeat CT - disease progression in abdominal nodes, liver, lungs and bones  Refer to Rad-Onc for palliative radiation. Due to progression of disease, I recommend starting mFOLFOX chemotherapy. Receiving mFOLFOX , 1 cycle  He has developed severe thrombocytopenia. He is loosing weight and not tolerating systemic chemotherapy. His performance status is declining. He is no longer a candidate for systemic chemotherapy. I recommend Hospice for end of life.      I saw his wife today with him and she is in agreement with Hospice plans  I will have Dr. Elyse Figueroa take the lead on his management with symptoms and transition to Hospice care. 2. Cancer related pain     S/P celiac plexus block - done by Dr. Patito Tian   Following with Palliative medicine. Pain meds      3. Bone metastasis, L4/L5    S/P Ablation/Kyphoplasty - Dr. Patito Tian  On Denosumab      5. Chemotherapy related neuropathy    Stable  Grade I       6. Atrial fibrillation    Cardiology      7. Thrombocytopenia    Observation      8. Severe protein calorie malnutrition    Dietician consult  Protein supplements      Plan:     1. D/C mFOLFOX  2. Refer to Hospice care      Signed by: Ashley Starkey MD                     March 25, 2022        CC. Dara Ames MD  CC. Hyun Casper MD  CC. Ahsan Mccullough MD  CC. Michelle Parrish MD  CC.  Madeline Kimble MD

## 2022-03-25 NOTE — LETTER
3/25/2022    Patient: Mina Infante. YOB: 1955   Date of Visit: 3/25/2022     Tanja Duff III, DO  [address]  [facility]  [address] [facility] [address]    Dear José Miguel Jung,      Thank you for referring Mr. Severiano Coyer to [address] [place] [address] for evaluation. My notes for this consultation are attached. If you have questions, please do not hesitate to call me. I look forward to following your patient along with you.       Sincerely,    Avtar Wolfe MD

## 2022-03-29 NOTE — PROGRESS NOTES
Pharmacy Progress Note - Medication Management    Assessment / Plan:   Diabetes Management:  - Per ADA guidelines, Pt's A1c is not at goal of < 8%.   - Given current comorbidities, discuss would like to maintain glycemic levels > 90 and < 180.   - Recommend for patient to stagger timing of SMBG checks. - Continue to hold off on metformin   - Continue with Jardiance 25 mg daily. Stay hydrated. Provided patient information regarding PAP. - Continue with glipizide 5 mg daily for now  - F/u on BG in 2 weeks    Other:  - Given multiple anticholinergic therapy, recommend for patient to stop benadryl.   - Consider pseudoephedrine instead of phenylephrine (Sudafed PE) to help w/ congestion. Do not take together with Mucinex-D     S/O: Mr. Nohemy Greco. is a 73 y.o. male, referred by Dr. 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 Patient's last A1c was 9.5% (March 2022), 6% (Aug 2021) . Brought in all home medications today. Saw Dr Katerin Zabala for routine f/u 3/7/22. A1c was 9.5%. Started on glipizide 5 mg daily. Jesus Rice was too expensive for him. Saw Dr. 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 Received 1 cycle of modified FOLFOX chemotherapy - Levofolinic acid, 5-Fluorouracil [5-FU] and oxaliplatin. However thrombocytopenia developed and tx was discontinued. Pt referred to hospice at this time. Reports increased appetite since stopping chemo. Current anti-hyperglycemic regimen include(s):    - Glipizide 5 mg daily with breakfast  - Metformin 500 mg BID --- reports he stopped metformin ~ 10 days d/t lack of BG lowering.   - Jardiance 25 mg daily  --Found 1 month supply of Jardiance.  Started this after stopping metformin      ROS:  Today, Pt endorses:  - Symptoms of Hyperglycemia: none  - Symptoms of Hypoglycemia: none    Blood Glucose Monitoring (BGM) or CGM:  No meter/log today. Reports to checking only FBG. Reports FBG today was 193. Recent readings have been in the 190s-low 200s. Reports BG were in the 250-300s when he was taking metformin & glipizide  Not interested in CGM    Other Medication Review:  Reports taking Creon 36K - three capsules and 1 tab of Beano before meals. Takes Compazine 10 mg - usually 5-10 mg PRN nausea. Has zofran 4 mg ODT for refractory nausea. Pain:  Dilaudid 8 mg PO PRN pain - reports to taking ~ three times a week. Mostly at night. Takes if pain does ot improve w/ methadone   Takes docusate/senna with each opioid dose. Denies constipation  Has opium tincture PRN diarrhea. No recent use. No longer on lomotil   Has diclofenac topical gel and lidoderm 5% patch PRN pain. Last use last week. Allergy:  Alternates between zyrtec 10 mg daily and claritin 10 mg daily for allergy. Does not take them together   Has mucinex-D for sinus congestion ; uses sudafed PE for mild congestion   Has benadryl for itching     Other:  Has Maalox and pepcid 20 mg PRN GERD  Takes iron 325 mg daily, Vitamin A 10,000 mg daily, and Vitamin E 400 units daily  Takes lasix 40 mg MWF for edema   Takes flomax and avodart 0.5 mg daily for BPH    The ASCVD Risk score (Yokasta Bourgeois., et al., 2013) failed to calculate for the following reasons: The valid total cholesterol range is 130 to 320 mg/dL     Vitals: Wt Readings from Last 3 Encounters:   03/29/22 140 lb (63.5 kg)   03/23/22 136 lb 1.6 oz (61.7 kg)   03/23/22 136 lb (61.7 kg)     BP Readings from Last 3 Encounters:   03/29/22 108/71   03/25/22 104/65   03/23/22 (!) 95/58     Pulse Readings from Last 3 Encounters:   03/29/22 70   03/25/22 84   03/23/22 81       Past Medical History:   Diagnosis Date    Aneurysm ([facility] [address] 2008    CEREBRAL    Arrhythmia     Previous a.fib, ablation, NSR now; NO LONGER FOLLOWED BY CARDIOLOGIST.     Autoimmune disease ([facility] [address]     SJOGREN'S    Calculus of kidney 2018    Cancer ([facility] [address] 2020    COMMON BILE DUCT, ADENOCARCINOMA, SPINE    Diabetes ([facility] [address]     NIDDM    Family history of skin cancer     Hypertension     Sepsis ([facility] [address] 2017    STENTING TO BILE DUCT    Status post chemotherapy     Stroke [facility]) 2008    brain aneurysm - no deficits    Sun-damaged skin     Sunburn, blistering      Allergies   Allergen Reactions    Shellfish Derived Anaphylaxis     Soft shell crabs    Morphine Nausea and Vomiting    Pcn [Penicillins] Other (comments)     Pt stated \"always been told PCN\"  Pt tolerated other cephalosporins in the past       Current Outpatient Medications   Medication Sig    methadone (DOLOPHINE) 10 mg tablet Take 2 Tablets by mouth two (2) times a day AND 3 Tablets nightly. Do all this for 30 days. Max Daily Amount: 70 mg.    glipiZIDE (GLUCOTROL) 5 mg tablet TAKE 1 TABLET BY MOUTH DAILY WITH BREAKFAST    clindamycin (CLEOCIN) 300 mg capsule Take 300 mg by mouth daily.  phenylephrine hcl 10 mg tab Take 10 mg by mouth daily.  prochlorperazine (COMPAZINE) 10 mg tablet Take 10 mg by mouth every six (6) hours as needed.  diphenhydrAMINE (BENADRYL) 25 mg tablet Take 1 Tablet by mouth nightly as needed.  metFORMIN (GLUCOPHAGE) 500 mg tablet Take 1 Tablet by mouth daily. Take with food. (Patient taking differently: Take 500 mg by mouth two (2) times daily (with meals). Take with food.)    furosemide (Lasix) 40 mg tablet Take 1 Tablet by mouth every Monday, Wednesday, Friday.  potassium chloride (Klor-Con) 20 mEq pack Take 1 Packet by mouth daily. (Patient not taking: Reported on 3/7/2022)    diclofenac (VOLTAREN) 1 % gel Apply  to affected area four (4) times daily.  lidocaine (LIDODERM) 5 % 1 Patch by TransDERmal route every twenty-four (24) hours. Apply patch to the affected area for 12 hours a day and remove for 12 hours a day.     ferrous sulfate 325 mg (65 mg iron) tablet Take 325 mg by mouth Daily (before breakfast).  dutasteride (AVODART) 0.5 mg capsule TAKE ONE CAPSULE BY MOUTH DAILY    tamsulosin (FLOMAX) 0.4 mg capsule TAKE 1 CAPSULE BY MOUTH ONCE EVERY EVENING    dexAMETHasone (DECADRON) 4 mg tablet Take 4 mg by mouth daily (with breakfast).  diphenoxylate-atropine (LomotiL) 2.5-0.025 mg per tablet Take 1 Tablet by mouth four (4) times daily as needed for Diarrhea. Max Daily Amount: 4 Tablets.  ondansetron (Zofran ODT) 4 mg disintegrating tablet Take 1 Tablet by mouth every eight (8) hours as needed for Nausea.  Creon 36,000-114,000- 180,000 unit cpDR capsule TAKE TWO TO THREE CAPSULES BY MOUTH WITH EACH MEAL AND ONE CAPSULE FOR SNACKS DAILY    colestipoL (COLESTID) 1 gram tablet     cyanocobalamin (VITAMIN B12) 1,000 mcg/mL injection 1 mL by IntraMUSCular route every seven (7) days.  calcium carbonate (CALCIUM 500 PO) Take  by mouth.  vitamin A (AQUASOL A) 10,000 unit capsule TAKE 1 CAPSULE BY MOUTH ONE TIME A DAY    OLANZapine (ZyPREXA) 10 mg tablet Take 1 Tab by mouth See Admin Instructions. Take nightly for 4 days starting the evening of chemotherapy.  lidocaine-prilocaine (EMLA) topical cream Apply  to affected area as needed for Pain. 30-45 min prior to treatments    naloxone [facility]) 4 mg/actuation nasal spray Use 1 spray intranasally, then discard. Repeat with new spray every 2 min as needed for opioid overdose symptoms, alternating nostrils.  aluminum & magnesium hydroxide-simethicone (Maalox Maximum Strength) 400-400-40 mg/5 mL suspension Take 10 mL by mouth every six (6) hours as needed for Indigestion.  vitamin E (AQUA GEMS) 400 unit capsule Take 1 Cap by mouth daily.  gabapentin (NEURONTIN) 100 mg capsule Take 3 capsules by mouth twice a day.  lidocaine (LIDODERM) 5 % 1 Patch by TransDERmal route every twenty-four (24) hours. Apply patch to the affected area for 12 hours a day and remove for 12 hours a day.     finasteride (PROSCAR) 5 mg tablet TAKE 1 TABLET BY MOUTH EVERY DAY    acetaminophen (TYLENOL) 325 mg tablet Take 2 Tabs by mouth every four (4) hours as needed for Pain or Fever (Over the counter medication).  alpha-D-galactosidase (BEANO PO) Take 1 Tab by mouth two (2) times a day.  cetirizine (ZYRTEC) 10 mg tablet Take 10 mg by mouth nightly. No current facility-administered medications for this visit. Lab Results   Component Value Date/Time    Sodium 136 03/23/2022 09:10 AM    Potassium 2.8 (L) 03/23/2022 09:10 AM    Chloride 103 03/23/2022 09:10 AM    CO2 26 03/23/2022 09:10 AM    Anion gap 7 03/23/2022 09:10 AM    Glucose 241 (H) 03/23/2022 09:10 AM    BUN 14 03/23/2022 09:10 AM    Creatinine 1.05 03/23/2022 09:10 AM    BUN/Creatinine ratio 13 03/23/2022 09:10 AM    GFR est AA >60 03/23/2022 09:10 AM    GFR est non-AA >60 03/23/2022 09:10 AM    Calcium 7.4 (L) 03/23/2022 09:10 AM    Bilirubin, total 0.9 03/23/2022 09:10 AM    Alk.  phosphatase 128 (H) 03/23/2022 09:10 AM    Protein, total 5.7 (L) 03/23/2022 09:10 AM    Albumin 3.2 (L) 03/23/2022 09:10 AM    Globulin 2.5 03/23/2022 09:10 AM    A-G Ratio 1.3 03/23/2022 09:10 AM    ALT (SGPT) 13 03/23/2022 09:10 AM       Lab Results   Component Value Date/Time    Cholesterol, total 82 08/06/2021 12:13 PM    HDL Cholesterol 44 08/06/2021 12:13 PM    LDL, calculated 24.6 08/06/2021 12:13 PM    VLDL, calculated 13.4 08/06/2021 12:13 PM    Triglyceride 67 08/06/2021 12:13 PM    CHOL/HDL Ratio 1.9 08/06/2021 12:13 PM       Lab Results   Component Value Date/Time    WBC 2.5 (L) 03/23/2022 09:10 AM    Hemoglobin (POC) 10.3 (L) 10/06/2017 01:14 PM    HGB 9.6 (L) 03/23/2022 09:10 AM    Hematocrit (POC) 28 (L) 05/13/2021 09:16 AM    HCT 29.3 (L) 03/23/2022 09:10 AM    PLATELET 44 (LL) 83/09/9433 09:10 AM    MCV 88.8 03/23/2022 09:10 AM       Lab Results   Component Value Date/Time    Microalbumin/Creat ratio (mg/g creat) 9 11/10/2021 08:52 AM    Microalbumin,urine random 0.73 11/10/2021 08:52 AM       HbA1c:  Lab Results   Component Value Date/Time    Hemoglobin A1c 6.0 (H) 08/06/2021 12:13 PM    Hemoglobin A1c (POC) 9.5 (A) 03/07/2022 04:13 PM     No components found for: 2     Last Point of Care HGB A1C  Hemoglobin A1c (POC)   Date Value Ref Range Status   03/07/2022 9.5 (A) 4.8 - 5.6 % Final        Estimated Creatinine Clearance: 63 mL/min (by C-G formula based on SCr of 1.05 mg/dL). Medication reconciliation was completed during the visit. Medications Discontinued During This Encounter   Medication Reason    OLANZapine (ZyPREXA) 10 mg tablet Therapy Completed    naloxone (NARCAN) 4 mg/actuation nasal spray Therapy Completed    dexAMETHasone (DECADRON) 4 mg tablet Therapy Completed    potassium chloride (Klor-Con) 20 mEq pack Therapy Completed    clindamycin (CLEOCIN) 300 mg capsule Therapy Completed    diphenoxylate-atropine (LomotiL) 2.5-0.025 mg per tablet Therapy Completed    diphenhydrAMINE (BENADRYL) 25 mg tablet Therapy Completed    acetaminophen (TYLENOL) 325 mg tablet Not A Current Medication    colestipoL (COLESTID) 1 gram tablet Not A Current Medication    calcium carbonate (CALCIUM 500 PO) Not A Current Medication    gabapentin (NEURONTIN) 100 mg capsule Therapy Completed    lidocaine-prilocaine (EMLA) topical cream Therapy Completed    metFORMIN (GLUCOPHAGE) 500 mg tablet Therapy Completed    lidocaine (LIDODERM) 5 % Therapy Completed    finasteride (PROSCAR) 5 mg tablet Therapy Completed    cyanocobalamin (VITAMIN B12) 1,000 mcg/mL injection Therapy Completed     Orders Placed This Encounter    empagliflozin (Jardiance) 25 mg tablet     Sig: Take 25 mg by mouth daily.  famotidine (PEPCID) 20 mg tablet     Sig: Take 20 mg by mouth two (2) times daily as needed for Heartburn. Patient verbalized understanding of the information presented and all of the patients questions were answered. AVS was handed to the patient.  Patient advised to call the office with any additional questions or concerns. Notifications of recommendations will be sent to Dr. Lenin Lambert DO for review. VV in 2 weeks on BG    Thank you for the consult,  Kasey Sal, PharmD, BCACP, [address] [place] [address] in place:  Yes   Recommendation Provided To: Patient/Caregiver: 20 via In person   Intervention Detail: CMR/TIP Completed, Discontinued Rx: 12, reason: Duplicate Therapy and Therapy Complete, New Rx: 2, reason: Patient Preference and Scheduled Appointment   Gap Closed?:    Intervention Accepted By: Patient/Caregiver: 20   Time Spent (min): 60

## 2022-03-29 NOTE — PATIENT INSTRUCTIONS
· Stagger the timing of your blood sugar checks. Will check with you in two weeks  · Continue jardiance 25 mg daily. Stay hydrated  · Continue glipizide 5 mg before breakfast    Prepared on: 3/29/22    Use this table to guide your pill box organization:    MORNING LUNCH EVENING    Creon 01823 - 3 cap before meal   Iron 325 mg - 1 tab   Vitamin E 400 units - 1 cap    Vitamin A 10,000 mg - 1 cap   Beano - 1 tab before meal   Docusate/sennoside 50/6.8 mg- 1 tab    Methadone 10 mg - two tabs   Glipizide 5 mg - 1 tab  Creon 88450 - 3 cap before meal   Beano - 1 tab before meal   Methadone 10 mg - two tabs   Creon 90538 - 3 cap before meal   Beano - 1 tab before meal   Jardiance 25 mg - 1 tab   Dutasteride 0.5 mg - 1 cap   Tamsulosin 0.4 mg - 1 cap   Cetirizine 10 mg - 1 tab    Methadone 10 mg - two tabs      Other schedule:  - Furosemide 40 mg - Mon, Wed, Fri    The following are your \"As Needed\" medications and should not be included into your pill box:  - Hydromorphone 8 mg - 1 tab every 3 hours as needed if pain does not improve after methadone   -  Prochlorperazine 10 mg - take one-half to 1 tab as needed for nausea. -  Ondansetron 4 mg ODT - as needed for nausea if prochlorperazine does not help  - Opium tincture - as needed for diarrhea  - Mucinex D 1200 mg/120 mg - 1 tablet daily as needed for chest and nasal congestion   -  Loratadine (Claritin) 10 mg - alternates with Zyrtec   - Maalox suspension - as needed for indigestion   - Docusate/sennoside - 1 tab three times daily as needed when taking pain medication   - Lidoderm 5% patch - as needed for pain  - Diclofenac topical gel - as needed for pain     Other recommendations:  - Consider sudafed (not the PE) to help with your decongestant.  Do not take together with Mucinex-D  - Stop benadryl

## 2022-04-01 NOTE — PERIOP NOTES
[address] [facility] INSTRUCTIONS    Surgery Date:   4-8-22    [facility] staff will call you between 4 PM- 8 PM the day before surgery with your arrival time. If your surgery is on a Monday, we will call you the preceding Friday. Please call [phone] after 8 PM if you did not receive your arrival time. 1. Please report to [facility] Patient Access/Admitting on the 1st floor. Bring your insurance card, photo identification, and any copayment ( if applicable). 2. If you are going home the same day of your surgery, you must have a responsible adult to drive you home. You need to have a responsible adult to stay with you the first 24 hours after surgery and you should not drive a car for 24 hours following your surgery. 3. Do NOT eat any solid foods after midnight the night before surgery including candy, mint or gum. You may drink clear liquids from midnight until 1 hour prior to your arrival. You may drink up to 12 ounces at one time every 4 hours. Please note special instructions, if applicable, below for medications. 4. Do NOT drink alcohol or smoke 24 hours before surgery. STOP smoking for 14 days prior as it helps with breathing and healing after surgery. 5. If you are being admitted to the hospital, please leave personal belongings/luggage in your car until you have an assigned hospital room number. 6. Please wear comfortable clothes. Wear your glasses instead of contacts. We ask that all money, jewelry and valuables be left at home. Wear no make up, particularly mascara, the day of surgery. 7.  All body piercings, rings, and jewelry need to be removed and left at home. Please remove any nail polish or artifical nails from your fingernails. Please wear your hair loose or down. Please no pony-tails, buns, or any metal hair accessories. If you shower the morning of surgery, please do not apply any lotions or powders afterwards. You may wear deodorant, unless having breast surgery.   Do not shave any body area within 24 hours of your surgery. 8. Please follow all instructions to avoid any potential surgical cancellation. 9. Should your physical condition change, (i.e. fever, cold, flu, etc.) please notify your surgeon as soon as possible. 10. It is important to be on time. If a situation occurs where you may be delayed, please call:  [phone] / [phone] on the day of surgery. 11. The Preadmission Testing staff can be reached at [phone]. 12. Special instructions: NONE      Current Outpatient Medications   Medication Sig    empagliflozin (Jardiance) 25 mg tablet Take 25 mg by mouth daily.  famotidine (PEPCID) 20 mg tablet Take 20 mg by mouth two (2) times daily as needed for Heartburn.  methadone (DOLOPHINE) 10 mg tablet Take 2 Tablets by mouth two (2) times a day AND 3 Tablets nightly. Do all this for 30 days. Max Daily Amount: 70 mg.    glipiZIDE (GLUCOTROL) 5 mg tablet TAKE 1 TABLET BY MOUTH DAILY WITH BREAKFAST    prochlorperazine (COMPAZINE) 10 mg tablet Take 10 mg by mouth every six (6) hours as needed.  furosemide (Lasix) 40 mg tablet Take 1 Tablet by mouth every Monday, Wednesday, Friday.  diclofenac (VOLTAREN) 1 % gel Apply  to affected area four (4) times daily. (Patient taking differently: Apply  to affected area daily as needed for Pain.)    lidocaine (LIDODERM) 5 % 1 Patch by TransDERmal route every twenty-four (24) hours. Apply patch to the affected area for 12 hours a day and remove for 12 hours a day. (Patient taking differently: 1 Patch by TransDERmal route daily as needed for Pain. Apply patch to the affected area for 12 hours a day and remove for 12 hours a day.)    ferrous sulfate 325 mg (65 mg iron) tablet Take 325 mg by mouth Daily (before breakfast).     dutasteride (AVODART) 0.5 mg capsule TAKE ONE CAPSULE BY MOUTH DAILY    tamsulosin (FLOMAX) 0.4 mg capsule TAKE 1 CAPSULE BY MOUTH ONCE EVERY EVENING    ondansetron (Zofran ODT) 4 mg disintegrating tablet Take 1 Tablet by mouth every eight (8) hours as needed for Nausea.  Creon 36,000-114,000- 180,000 unit cpDR capsule TAKE TWO TO THREE CAPSULES BY MOUTH WITH EACH MEAL AND ONE CAPSULE FOR SNACKS DAILY (Patient taking differently: Take 1-3 Capsules by mouth See Admin Instructions. 3 capsules before each meals and 1 capsule before snack)    vitamin A (AQUASOL A) 10,000 unit capsule TAKE 1 CAPSULE BY MOUTH ONE TIME A DAY    aluminum & magnesium hydroxide-simethicone (Maalox Maximum Strength) 400-400-40 mg/5 mL suspension Take 10 mL by mouth every six (6) hours as needed for Indigestion.  vitamin E (AQUA GEMS) 400 unit capsule Take 1 Cap by mouth daily.  alpha-D-galactosidase (BEANO PO) Take 1 Tablet by mouth three (3) times daily.  cetirizine (ZYRTEC) 10 mg tablet Take 10 mg by mouth nightly.  phenylephrine hcl 10 mg tab Take 10 mg by mouth daily. No current facility-administered medications for this encounter. 1. YOU MUST ONLY TAKE THESE MEDICATIONS THE MORNING OF SURGERY WITH A SIP OF WATER: METHADONE  2. MEDICATIONS TO TAKE THE MORNING OF SURGERY ONLY IF NEEDED: PEPCID, ZOFRAN  3. HOLD these prescription medications BEFORE Surgery: DICLOFENAC GEL HOLD 3 DAYS PRIOR TO SURGERY. 4. Ask your surgeon/prescribing physician about when/if to STOP taking these medications: NONE  5. Stop all vitamins, herbal medicines and Aspirin containing products 7 days prior to surgery. Stop any non-steroidal anti-inflammatory drugs (i.e. Ibuprofen, Naproxen, Advil, Aleve) 3 days before surgery. You may take Tylenol. 6. If you are currently taking Plavix, Coumadin,or any other blood-thinning/anticoagulant medication contact your prescribing physician for instructions. Eating and Drinking Before Surgery     You may eat a regular dinner at the usual time on the day before your surgery.    Do NOT eat any solid foods after midnight unless your arrival time at the hospital is 3pm or later.  Panfilo Aldana may drink clear liquids only from 12 midnight until 1 hours prior to your arrival time at the hospital on the day of your surgery. Do NOT drink alcohol.  Clear liquids include:  o Water  o Fruit juices without pulp( i.e. apple juice)  o Carbonated beverages  o Black coffee (no cream/milk)  o Tea (no cream/milk)  o Gatorade   You may drink up to 12-16 ounces at one time every 4 hours between the hours of midnight and 1 hour before your arrival time at the hospital. Example- if your arrival time at the hospital is 6am, you may drink 12-16 ounces of clear liquids no later than 5am.   If your arrival time at the hospital is 3pm or later, you may eat a light breakfast before 8am.   A light breakfast includes:  o Toast or bagel (no butter)  o Black coffee (no cream/milk)  o Tea (no cream/milk)  o Fruit juices without pulp ( i.e. apple juice)  o Do NOT eat meat, eggs, vegetables or fruit   If you have any questions, please contact your surgeon's office. Preventing Infections Before and After  Your Surgery    IMPORTANT INSTRUCTIONS    You play an important role in your health and preparation for surgery. To reduce the germs on your skin you will need to shower with CHG soap (Chorhexidine gluconate 4%) two times before surgery. CHG soap (Hibiclens, Hex-A-Clens or store brand)   CHG soap will be provided at your Preadmission Testing (PAT) appointment.  If you do not have a PAT appointment before surgery, you may arrange to  CHG soap from our office or purchase CHG soap at a pharmacy, grocery or department store.  You need to purchase TWO 4 ounce bottles to use for your 2 showers. Steps to follow:  1. Wash your hair with your normal shampoo and your body with regular soap and rinse well to remove shampoo and soap from your skin. 2. Wet a clean washcloth and turn off the shower. 3. Put CHG soap on washcloth and apply to your entire body from the neck down.  Do not use on your head, face or private parts(genitals). Do not use CHG soap on open sores, wounds or areas of skin irritation. 4. Wash you body gently for 5 minutes. Do not wash your skin too hard. This soap does not create lather. Pay special attention to your underarms and from your belly button to your feet. 5. Turn the shower back on and rinse well to get CHG soap off your body. 6. Pat your skin dry with a clean, dry towel. Do not apply lotions or moisturizer. 7. Put on clean clothes and sleep on fresh bed sheets and do not allow pets to sleep with you. Shower with CHG soap 2 times before your surgery   The evening before your surgery   The morning of your surgery      Tips to help prevent infections after your surgery:  1. Protect your surgical wound from germs:  ? Hand washing is the most important thing you and your caregivers can do to prevent infections. ? Keep your bandage clean and dry! ? Do not touch your surgical wound. 2. Use clean, freshly washed towels and washcloths every time you shower; do not share bath linens with others. 3. Until your surgical wound is healed, wear clothing and sleep on bed linens each day that are clean and freshly washed. 4. Do not allow pets to sleep in your bed with you or touch your surgical wound. 5. Do not smoke  smoking delays wound healing. This may be a good time to stop smoking. 6. If you have diabetes, it is important for you to manage your blood sugar levels properly before your surgery as well as after your surgery. Poorly managed blood sugar levels slow down wound healing and prevent you from healing completely. Patient Information Regarding COVID Restrictions      Day of Procedure     Please park in the parking deck or any designated visitor parking lot.    Enter the facility through the [facility] of the [facility].   On the day of surgery, please provide the cell phone number of the person who will be waiting for you to the Patient Access representative at the time of registration.  Please wear a mask on the day of your procedure.  We are now allowing two designated visitors per stay. Pediatric patients may have 2 designated visitors. These two people may come in with you on the day of your procedure.  No visitors under the age of 13.  The designated visitor must also wear a mask.  Once your procedure and the immediate recovery period is completed, a nurse in the recovery area will contact your designated visitor to inform them of your room number or to otherwise review other pertinent information regarding your care.  Social distancing practices are to be adhered to in waiting areas and the cafeteria. The patient was contacted  in person. He verbalized understanding of all instructions does not  need reinforcement.

## 2022-04-04 NOTE — PERIOP NOTES
CC MSG SENT TO PATRICIA SMITH AT [facility] RE: ABNORMAL LABS AND CXR.:    \"Patricia Hidalgo,      Please note the following abnormal labs drawn in PAT:    INR/PT 1.2/12.1    PTT 33.4    HGB A1C  9.0      CXR FINDINGS:    Study Result    Narrative & Impression  EXAM:  XR CHEST PA LAT     INDICATION: Preoperative evaluation. Hypertension.     COMPARISON: CT 2/21/2022. Radiograph 2/18/2022.     TECHNIQUE: Frontal and lateral chest views.     FINDINGS: The right chest Port-A-Cath is stable. The cardiomediastinal contours  are stable. There are stable small pleural effusions and scattered bilateral  lung nodules. There is no pneumothorax. The bones and upper abdomen are stable.     IMPRESSION  Stable small pleural effusions and scattered bilateral lung nodules. \"      Ana Paula Reeves NP NOTIFIED OF ABNORMAL LABS.

## 2022-04-05 NOTE — PROGRESS NOTES
Palliative Medicine Outpatient Services  [place]: [phone] ([phone])    Patient Name: Yadira Zamudio. YOB: 1955    Date of Current Visit: 04/06/22  Location of Current Visit:    [] [facility] Office  [] [facility] Office  [] [address] [place] [address]  [] Home  [x] Other: Virtual synchronous A/V visit    Date of Initial Visit: 4/6/2020  Referral from: Dr. Eric Huerta  Primary Care Physician: Art Ashley DO      SUMMARY:   Yadira Nichols is a 73y.o. 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 Eric Huerta for management of symptoms and psychosocial support. 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 This is second marriage for both of them. Current treatment-completed RT to the para-aortic mass, pursuing diagnostics with GI physician Dr. Domingo Tavera for gastroparesis and pancreatic enzymes, has had biliary stents replaced. Status post celiac plexus block and reversal of Whipple procedure on 9/9/2020    Hospitalization at [facility] for uncontrolled pain in December 2020    L5 biopsy, ablation and kyphoplasty on 1/12/2021        Status post ostiocool procedure to the right hip    Patient is off all chemotherapy due to progression of disease, has not decided on hospice admission     PALLIATIVE DIAGNOSES:       ICD-10-CM ICD-9-CM    1. Abdominal pain, generalized  R10.84 789.07    2. Cholangiocarcinoma of biliary tract (HCC)  C22.1 155.1 HYDROmorphone (DILAUDID) 8 mg tablet   3.  Nausea and vomiting, unspecified vomiting type  R11.2 787.01    4. Functional diarrhea  K59.1 564.5    5. Neoplastic malignant related fatigue  R53.0 780.79           PLAN:   Patient Instructions   Dear Louie Miller.,     It was a pleasure seeing you today by virtual video visit.     This is the plan we talked about:    -Disease discussion  -We reviewed your most recent CT and bone scan. There is progression of disease in the lung nodules. You are off chemotherapy. You feel well overall and did not want to discuss further options in front of his 2 sons today. You wanted me to discuss with your wife Esther Crow.     -Chronic low back pain from new L5 met status post ablation and kyphoplasty, new and progressive right hip pain from sacral met  -This pain is fairly well controlled with the changes last time  -Continue methadone  20 mg 3 times a day  -You are back on hydromorphone despite it causing you hives because you did not think oxycodone was helpful and it also made you very sleepy. -Continue hydromorphone 8 mg every 3 hours as needed. You have not needed hydromorphone since increase in methadone and radiation to the hip.  -Add Advil 400 mg with breakfast lunch and dinner  -Apply lidocaine patch and Voltaren gel to your right hip which helps you the most.      -Chemo-induced diarrhea  -This is resolved. You are no longer on tincture of opium.    -Sjogren's disease  -You are very careful to stay well hydrated. You drink water and sugar-free gatorade or powerade.     Persistent nausea despite discontinuation of chemotherapy   -Please take Zofran every 4 hours as needed and Compazine 10 mg every 6 hours while you have the chemo pump on. You are going to the infusion center to have it removed tomorrow and I will arrange for you to have IV fluids along with some Zofran and Pepcid intravenously. -Fatigue  -Sometimes you cannot do what you want to do because of the tiredness of your legs.  You can get what you need to do done as long as you take breaks.    -Nausea  Your nausea has returned. I provided you with Compazine.    -Follow up  -W I will see you again in 4 weeks       reviewed and appropriate. Most recent CT scan reviewed and results discussed with patient. See results below in data portion of note. GOALS OF CARE / TREATMENT PREFERENCES:   [====Goals of Care====]  GOALS OF CARE:  Patient / health care proxy stated goals: See Patient Instructions / Summary    TREATMENT PREFERENCES:   Code Status:  [x] Attempt Resuscitation       [] Do Not Attempt Resuscitation    Advance Care Planning:  [x] The Leads Direct Interdisciplinary Team has updated the ACP Navigator with Decision Maker and Patient Capacity      Primary Decision MakerSullyalyssa Cooper - [phone]    Secondary Decision Maker: Ridge Cristina III - Child - [phone]    Supplemental (Other) Decision Maker: Angeles Rogers Child - [phone]  Advance Care Planning 4/5/2022   Patient's Healthcare Decision Maker is: Named in scanned ACP document   Primary Decision Maker Name -   Primary Decision Maker Phone Number -   Primary Decision Maker Relationship to Patient -   Confirm Advance Directive Yes, on file   Patient Would Like to Complete Advance Directive -   Does the patient have other document types -       Other:  (If patient appropriate for POST, consider using PALLPOST smart phrase here)    The palliative care team has discussed with patient / health care proxy about goals of care / treatment preferences for patient.  [====Goals of Care====]     PRESCRIPTIONS GIVEN:     Medications Ordered Today   Medications    HYDROmorphone (DILAUDID) 8 mg tablet     Sig: Take 1 Tablet by mouth every three (3) hours as needed for Pain for up to 15 days. Max Daily Amount: 64 mg.      Dispense:  120 Tablet     Refill:  0           FOLLOW UP:     Future Appointments   Date Time Provider Sloane Roach   4/6/2022 10:00  [address] [place] [address]   4/14/2022 11:00 AM Zi Puente, ANGELINA [facility] BS AMB   4/20/2022  9:30 AM [address] [facility]   4/21/2022  9:00 AM Chioma Bourgeois MD [facility] BS AMB   5/4/2022  9:30 AM Stacey   5/18/2022  9:30 AM [address] [facility]   5/25/2022  8:30 AM Jamal Raya Emory III, DO [facility] BS AMB   6/1/2022  9:30 AM [address] [facility]           PHYSICIANS INVOLVED IN CARE:   Patient Care Team:  Tripp Fleming DO as PCP - General (Internal Medicine)  Tripp Fleming DO as PCP - [facility] Empaneled Provider  Chioma Bourgeois MD (General Surgery)  Elsy Marx MD (Gastroenterology)  Kevin Irene MD (Gastroenterology)  Carola Yuen MD (Hematology and Oncology)  Nora Pollard MD as Palliative Care (Palliative Medicine)       HISTORY:   Reviewed patient-completed ESAS and advance care planning form. Reviewed patient record in prescription monitoring program.    CHIEF COMPLAINT:   No chief complaint on file. 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 He has more energy now that he is off chemotherapy, he continues to have nausea for which he takes Compazine on a regular basis. His pain is fairly well controlled with methadone 3 times a day, he takes hydromorphone as needed. -------    Patient here with his wife. Both are very good historians. Mid upper back pain-much improved since radiation to the periaortic mass. He struggled with pain for several months before it was identified as cancer recurrence. He was started on fentanyl 25 mcg patch which he wants to wean off of. He has made upper and mid zone abdominal pain which comes and goes. He has not noticed any specific pattern to the pain but usually the pain comes on before vomiting or relieves without vomiting. Sometimes, he vomits food that he ate about 12 to 14 hours ago.   No constipation. He has 2 liquid bowel movements every day. He is unable to tolerate eggs or honey. He is getting tests done to evaluate his pancreatic enzymes. He has very good support through his wife. Clinical Pain Assessment (nonverbal scale for nonverbal patients):   [++++ Clinical Pain Assessment++++]  [++++Pain Severity++++]: Pain: 6  [++++Pain Character++++]: cramping  [++++Pain Duration++++]: months  [++++Pain Effect++++]: functional   [++++Pain Factors++++]: none in particular  [++++Pain Frequency++++]: on and off  [++++Pain Location++++]: upper abdomen and mid back  [++++ Clinical Pain Assessment++++]       FUNCTIONAL ASSESSMENT:     Palliative Performance Scale (PPS):  PPS: 70       PSYCHOSOCIAL/SPIRITUAL SCREENING:     Any spiritual / Jewish concerns:  [] Yes /  [x] No    Caregiver Burnout:  [] Yes /  [x] No /  [] No Caregiver Present      Anticipatory grief assessment:   [x] Normal  / [] Maladaptive       ESAS Anxiety: Anxiety: 0    ESAS Depression: Depression: 0       REVIEW OF SYSTEMS:     The following systems were [x] reviewed / [] unable to be reviewed  Systems: constitutional, ears/nose/mouth/throat, respiratory, gastrointestinal, genitourinary, musculoskeletal, integumentary, neurologic, psychiatric, endocrine. Positive findings noted below. Modified ESAS Completed by: provider   Fatigue: 9 Drowsiness: 6   Depression: 0 Pain: 6   Anxiety: 0 Nausea: 3   Anorexia: 7 Dyspnea: 7   Best Well-Bein Constipation: No   Other Problem (Comment): 0          PHYSICAL EXAM:     Wt Readings from Last 3 Encounters:   22 136 lb 14.5 oz (62.1 kg)   22 140 lb (63.5 kg)   22 136 lb 1.6 oz (61.7 kg)     There were no vitals taken for this visit.   Last bowel movement: See Nursing Note    Constitutional    [x] Appears well-developed and well-nourished in no apparent distress    [] Abnormal:  Mental status  [x] Alert and awake  [x] Oriented to person/place/time  [x] Able to follow commands  [] Abnormal:   Eyes  [x] EOM normal   [x] Sclera normal   [x] No visible ocular discharge  [] Abnormal:   HENT  [x] Normocephalic, atraumatic  [x] Mouth/Throat: Moist mucous membranes   [x] External Ears normal  [] Abnormal:  Neck  [x] No visualized mass  [] Abnormal:  Pulmonary/Chest   [x] Respiratory effort normal  [x] No visualized signs of difficulty breathing or respiratory distress  [] Abnormal:  Musculoskeletal  [x] Normal gait with no signs of ataxia  [x] Normal range of motion of neck  [] Abnormal:  Neurological:   [x] No facial asymmetry (Cranial nerve 7 motor function)  [x] No gaze palsy  [] Abnormal:   Skin  [x] No significant exanthematous lesions or discoloration noted on facial skin  [] Abnormal:                                  Psychiatric  [x] Normal affect  [x] No hallucinations  [] Abnormal:    Other pertinent observable physical exam findings:    Due to this being a TeleHealth evaluation, many elements of the physical examination are unable to be assessed. HISTORY:     Past Medical History:   Diagnosis Date    Aneurysm ([address] 2008    CEREBRAL    Arrhythmia     Previous a.fib, ablation, NSR now; NO LONGER FOLLOWED BY CARDIOLOGIST.     Arthritis     Autoimmune disease ([address]     SJOGREN'S    Calculus of kidney 2018    Cancer ([address] 2020    COMMON BILE DUCT, ADENOCARCINOMA, SPINE    Diabetes ([address]     NIDDM    Family history of skin cancer     Hypertension     Sepsis ([address] 2017    STENTING TO BILE DUCT    Status post chemotherapy     Stroke [facility]) 2008    brain aneurysm - no deficits    Sun-damaged skin     Sunburn, blistering       Past Surgical History:   Procedure Laterality Date    HX CHOLECYSTECTOMY  2017    HX COLONOSCOPY  6/4/18    HX GI      COLONOSCOPY, POLYPS (BENIGN)    HX GI  10/06/2017    [facility] [place]     HX GI  2020    WHIPPLE REVISION    HX HEART CATHETERIZATION  2005    Ablation     HX HERNIA REPAIR  12/2020    HERNIA REPAIR    HX LUMBAR FUSION  2005    HX ORTHOPAEDIC  2000    Spinal fusion W/ HARDWARE    HX OTHER SURGICAL  11/07/2017    [place] Cath, Dr. Jon Thornton [facility]  01/11/2021    RIGHT IJ PORT-A-CATH PLACEMENT     HX VASCULAR ACCESS  2017    PORTACATH - then removed    HX VASCULAR ACCESS Right 2020    PORTACATH    IR ABLATE BONE TUMOR PERC W CRYO  12/23/2021    IR KYPHOPLASTY LUMBAR  1/12/2021    NEUROLOGICAL PROCEDURE UNLISTED  2008    [place] hole washout from cerebral hemorrhage    NEUROLOGICAL PROCEDURE UNLISTED  2020, 2021    KYPHOPLASTY    GA ABDOMEN SURGERY PROC UNLISTED  10/2017    WHIPPLE    GA ABDOMEN SURGERY PROC UNLISTED  2020    WHIPPLE REVISION      Family History   Problem Relation Age of Onset    Cancer Father         Breast and Colon, MELANOMA    Hypertension Father     Diabetes Father     Cancer Mother         LEUKEMIA    No Known Problems Sister     Atrial Fibrillation Brother     No Known Problems Sister     No Known Problems Son     No Known Problems Son     Anesth Problems Neg Hx       History reviewed, no pertinent family history. Social History     Tobacco Use    Smoking status: Never Smoker    Smokeless tobacco: Never Used   Substance Use Topics    Alcohol use: Yes     Comment: OCCASIONALLY     Allergies   Allergen Reactions    Shellfish Derived Anaphylaxis     Soft shell crabs    Morphine Nausea and Vomiting    Pcn [Penicillins] Other (comments)     Pt stated \"always been told PCN\"  Pt tolerated other cephalosporins in the past      Current Outpatient Medications   Medication Sig    HYDROmorphone (DILAUDID) 8 mg tablet Take 1 Tablet by mouth every three (3) hours as needed for Pain for up to 15 days. Max Daily Amount: 64 mg.  empagliflozin (Jardiance) 25 mg tablet Take 25 mg by mouth daily.  famotidine (PEPCID) 20 mg tablet Take 20 mg by mouth two (2) times daily as needed for Heartburn.     methadone (DOLOPHINE) 10 mg tablet Take 2 Tablets by mouth two (2) times a day AND 3 Tablets nightly. Do all this for 30 days. Max Daily Amount: 70 mg.    glipiZIDE (GLUCOTROL) 5 mg tablet TAKE 1 TABLET BY MOUTH DAILY WITH BREAKFAST    phenylephrine hcl 10 mg tab Take 10 mg by mouth daily.  prochlorperazine (COMPAZINE) 10 mg tablet Take 10 mg by mouth every six (6) hours as needed.  furosemide (Lasix) 40 mg tablet Take 1 Tablet by mouth every Monday, Wednesday, Friday.  diclofenac (VOLTAREN) 1 % gel Apply  to affected area four (4) times daily. (Patient taking differently: Apply  to affected area daily as needed for Pain.)    lidocaine (LIDODERM) 5 % 1 Patch by TransDERmal route every twenty-four (24) hours. Apply patch to the affected area for 12 hours a day and remove for 12 hours a day. (Patient taking differently: 1 Patch by TransDERmal route daily as needed for Pain. Apply patch to the affected area for 12 hours a day and remove for 12 hours a day.)    ferrous sulfate 325 mg (65 mg iron) tablet Take 325 mg by mouth Daily (before breakfast).  dutasteride (AVODART) 0.5 mg capsule TAKE ONE CAPSULE BY MOUTH DAILY    tamsulosin (FLOMAX) 0.4 mg capsule TAKE 1 CAPSULE BY MOUTH ONCE EVERY EVENING    ondansetron (Zofran ODT) 4 mg disintegrating tablet Take 1 Tablet by mouth every eight (8) hours as needed for Nausea.  Creon 36,000-114,000- 180,000 unit cpDR capsule TAKE TWO TO THREE CAPSULES BY MOUTH WITH EACH MEAL AND ONE CAPSULE FOR SNACKS DAILY (Patient taking differently: Take 1-3 Capsules by mouth See Admin Instructions. 3 capsules before each meals and 1 capsule before snack)    vitamin A (AQUASOL A) 10,000 unit capsule TAKE 1 CAPSULE BY MOUTH ONE TIME A DAY    aluminum & magnesium hydroxide-simethicone (Maalox Maximum Strength) 400-400-40 mg/5 mL suspension Take 10 mL by mouth every six (6) hours as needed for Indigestion.  vitamin E (AQUA GEMS) 400 unit capsule Take 1 Cap by mouth daily.     alpha-D-galactosidase (BEANO PO) Take 1 Tablet by mouth three (3) times daily.  cetirizine (ZYRTEC) 10 mg tablet Take 10 mg by mouth nightly. No current facility-administered medications for this visit. LAB DATA REVIEWED:     Lab Results   Component Value Date/Time    WBC 2.5 (L) 03/23/2022 09:10 AM    HGB 9.6 (L) 03/23/2022 09:10 AM    PLATELET 44 (LL) 59/80/7947 09:10 AM     Lab Results   Component Value Date/Time    Sodium 136 03/23/2022 09:10 AM    Potassium 2.8 (L) 03/23/2022 09:10 AM    Chloride 103 03/23/2022 09:10 AM    CO2 26 03/23/2022 09:10 AM    BUN 14 03/23/2022 09:10 AM    Creatinine 1.05 03/23/2022 09:10 AM    Calcium 7.4 (L) 03/23/2022 09:10 AM    Magnesium 2.5 (H) 03/23/2022 09:10 AM    Phosphorus 2.5 (L) 03/09/2022 09:21 AM      Lab Results   Component Value Date/Time    Alk. phosphatase 128 (H) 03/23/2022 09:10 AM    Protein, total 5.7 (L) 03/23/2022 09:10 AM    Albumin 3.2 (L) 03/23/2022 09:10 AM    Globulin 2.5 03/23/2022 09:10 AM     Lab Results   Component Value Date/Time    INR 1.2 (H) 04/01/2022 09:06 AM    Prothrombin time 12.1 (H) 04/01/2022 09:06 AM    aPTT 33.4 (H) 04/01/2022 09:06 AM      Lab Results   Component Value Date/Time    Iron 31 (L) 01/02/2020 03:24 AM    TIBC 245 (L) 01/02/2020 03:24 AM    Iron % saturation 13 (L) 01/02/2020 03:24 AM    Ferritin 122 01/02/2020 03:24 AM     MRI- Dec 2021     IMPRESSION  1. Osseous metastases in the right hemisacrum and right posterosuperior  acetabulum. 2.  Thin-walled rim-enhancing fluid signal focus in the right inguinal region,  indeterminate, but differential considerations include necrotic metastatic lymph  node and fluid collection (e.g., seroma, abscess). Correlate clinically. 3.  Patchy edema signal and enhancement within and surrounding the left anterior  inferior iliac spine and rectus femoris origin, may reflect enthesopathic change  and/or age indeterminate rectus femoris avulsion injury.   4.  Bilateral hip joint osteoarthritis with (likely) degenerative superior  acetabular labral tears.         CT- Nov 2021  IMPRESSION  1. Progression of lung metastases. 2. Stable retroperitoneal tumor causing vascular occlusions/stenoses and left  UPJ obstruction. 3. New sclerotic bone focus/metastasis. Chest CT- aug 2021       IMPRESSION  1. Multiple pulmonary nodules some of which have increased in size which may  represent progression of disease. Several new nodules are noted.     2. Nonspecific foci of enhancement in the right lobe the liver. These may  represent focal areas of inflammation or hyperemia given the  hepaticojejunostomy. Attention on follow-up imaging     3. Status post Whipple procedure.     4.  Stable periaortic soft tissue density.     5.  Stable perinephric stranding and fluid around the left kidney hilum       CT chest, abdomen and pelvis-May 2021  IMPRESSION     1. Unchanged appearance of the chest, abdomen, and pelvis when compared to  3/1/2021. No acute findings or evidence of disease progression. 2. Unchanged small pulmonary nodules. Unchanged retroperitoneal soft tissue mass  with left renal vein occlusion and collateral retroperitoneal venous structures.       CT abd 3/1/21  IMPRESSION  1. No significant change in the appearance of pulmonary nodules. 2. No significant change in the appearance of retroperitoneal adenopathy/mass. There is mass effect upon the vasculature, including occlusion of the left renal  vein with collateral vessels in the retroperitoneum, likely unchanged. 3. Incidental findings as above. CT chest 12/29/2020  IMPRESSION:     1. Numerous tiny pulmonary parenchymal nodules right greater than left,  suspicious for metastatic disease. 2. Focal ill-defined opacity in the right upper lobe which may represent  infiltrate. Follow-up recommended, however. 3. Incidental findings in the upper abdomen described earlier same day.      CONTROLLED SUBSTANCES SAFETY ASSESSMENT (IF ON CONTROLLED SUBSTANCES):     Reviewed opioid safety handout:  [x] Yes   [] No  24 hour opioid dose >150mg morphine equivalent/day:  [] Yes   [x] No  Benzodiazepines:  [] Yes   [x] No  Sleep apnea:  [] Yes   [x] No  Urine Toxicology Testing within last 6 months:  [] Yes   [x] No  History of or new aberrant medication taking behaviors:  [] Yes   [x] No  Has Narcan been prescribed [x] Yes   [] No          Total time: 45 minutes   Counseling / coordination time: 40  > 50% counseling / coordination?:   Consent:  He and/or health care decision maker is aware that that he may receive a bill for this telehealth service, depending on his insurance coverage, and has provided verbal consent to proceed: Yes    CPT Codes 91628-71872 for Established Patients may apply to this Telehealth Visit  Pursuant to the emergency declaration under the [facility] waiver authority and the [facility] and [employer] Act, this Virtual  Visit was conducted, with patient's consent, to reduce the patient's risk of exposure to COVID-19 and provide continuity of care for an established patient. Services were provided through a video synchronous discussion virtually to substitute for in-person clinic visit.

## 2022-04-05 NOTE — PATIENT INSTRUCTIONS
Dear Gerardo Escalante.,     It was a pleasure seeing you today by virtual video visit.     This is the plan we talked about:    -Disease discussion  -We reviewed your most recent CT and bone scan. There is progression of disease in the lung nodules. You are off chemotherapy. You feel well overall and did not want to discuss further options in front of his 2 sons today. You wanted me to discuss with your wife Brandy Reid.     -Chronic low back pain from new L5 met status post ablation and kyphoplasty, new and progressive right hip pain from sacral met  -This pain is fairly well controlled with the changes last time  -Continue methadone  20 mg 3 times a day  -You are back on hydromorphone despite it causing you hives because you did not think oxycodone was helpful and it also made you very sleepy. -Continue hydromorphone 8 mg every 3 hours as needed. You have not needed hydromorphone since increase in methadone and radiation to the hip.  -Add Advil 400 mg with breakfast lunch and dinner  -Apply lidocaine patch and Voltaren gel to your right hip which helps you the most.      -Chemo-induced diarrhea  -This is resolved. You are no longer on tincture of opium.    -Sjogren's disease  -You are very careful to stay well hydrated. You drink water and sugar-free gatorade or powerade.     Persistent nausea despite discontinuation of chemotherapy   -Please take Zofran every 4 hours as needed and Compazine 10 mg every 6 hours while you have the chemo pump on. You are going to the infusion center to have it removed tomorrow and I will arrange for you to have IV fluids along with some Zofran and Pepcid intravenously. -Fatigue  -Sometimes you cannot do what you want to do because of the tiredness of your legs. You can get what you need to do done as long as you take breaks.    -Nausea  Your nausea has returned.   I provided you with Compazine.    -Follow up  -W I will see you again in 4 weeks       reviewed and appropriate. Most recent CT scan reviewed and results discussed with patient. See results below in data portion of note.

## 2022-04-05 NOTE — PROGRESS NOTES
Palliative Medicine Office Visit  Palliative Medicine Nurse Check In  [phone]    Patient Name: Sanjiv Medina. YOB: 1955      Date of Office Visit: 4/5/2022    Patient states: \"  \"    From Check In Sheet (scanned in Media):  Has a medical provider talked with you about cardiopulmonary resuscitation (CPR)? [x] Yes   [] No   [] Unable to obtain    Nurse reminder to complete or update ACP FlowSheet:    Is ACP on the Problem List?    [x] Yes    [] No  IF ACP Document is ON FILE; Nurse to place ACP on Problem List     Is there an ACP Note in Chart Review/Note? [x] Yes    [] No   If NO: ALERT PROVIDER       Primary Decision MakerSharlee Officer - [phone]    Secondary Decision Maker: Ridge Cristina III - Child - [phone]    Supplemental (Other) Decision Maker: Edilberto Costa Child - [phone]  Advance Care Planning 4/5/2022   Patient's [address] is: Named in scanned ACP document   Primary Decision Maker Name -   Primary Decision Maker Phone Number -   Primary Decision Maker Relationship to Patient -   Confirm Advance Directive Yes, on file   Patient Would Like to Complete Advance Directive -   Does the patient have other document types -         Is there anything that we should know about you as a person in order to provide you the best care possible? Have you been to the ER, urgent care clinic since your last visit? [] Yes   [x] No   [] Unable to obtain    Have you been hospitalized since your last visit? [] Yes   [x] No   [] Unable to obtain    Have you seen or consulted any other health care providers outside of the [address] [place] [address] since your last visit?    [] Yes   [x] No   [] Unable to obtain    Functional status (describe):         Last BM: 4/5/2022     accessed (date): 4/5/2022    Bottle review (for opioid pain medication):  Medication 1:   Date filled:   Directions:   # filled:   # left:   # pills taking per day:  Last dose taken:    Medication 2:   Date filled:   Directions:   # filled:   # left:   # pills taking per day:  Last dose taken:    Medication 3:   Date filled:   Directions:   # filled:   # left:   # pills taking per day:  Last dose taken:    Medication 4:   Date filled:   Directions:   # filled:   # left:   # pills taking per day:  Last dose taken:

## 2022-04-08 NOTE — ANESTHESIA PREPROCEDURE EVALUATION
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)   (+) Cholangiocarcinoma of biliary tract (HCC)       Anesthetic History     PONV          Review of Systems / Medical History  Patient summary reviewed, nursing notes reviewed and pertinent labs reviewed    Pulmonary          Undiagnosed apnea         Neuro/Psych       CVA  TIA     Cardiovascular    Hypertension        Dysrhythmias       Exercise tolerance: >4 METS     GI/Hepatic/Renal               Comments: whippple Endo/Other    Diabetes    Cancer     Other Findings   Comments: Extrahepatic cholangiocarcinoma            Physical Exam    Airway  Mallampati: I  TM Distance: 4 - 6 cm  Neck ROM: normal range of motion   Mouth opening: Normal     Cardiovascular    Rhythm: regular           Dental  No notable dental hx       Pulmonary  Breath sounds clear to auscultation               Abdominal         Other Findings            Anesthetic Plan    ASA: 3  Anesthesia type: general          Induction: Intravenous  Anesthetic plan and risks discussed with: Patient

## 2022-04-08 NOTE — DISCHARGE INSTRUCTIONS
Inguinal Hernia Repair      Patient Discharge Instructions    Rasheed May / [id number] : 1955    Admitted 2022 Discharged: 2022     · It is important that you take the medication exactly as they are prescribed. · Keep your medication in the bottles provided by the pharmacist and keep a list of the medication names, dosages, and times to be taken in your wallet. · Do not take other medications without consulting your doctor. · Wound care: You may shower starting tomorrow. Do not remove the dermabond on the incision, it will fall of on its own in a few weeks. · No heavy lifting or strenuous activity for 4-6 weeks after the operation    Take pain medication as recommended by your Palliative Care team.      What to do at Home    See instructions below. Follow-up with Dr. Andie Goodwin in 2 week(s). Call the office ([phone]) to schedule your appointment. Information obtained by :  I understand that if any problems occur once I am at home I am to contact my physician. I understand and acknowledge receipt of the instructions indicated above. [facility] or R.N.'s Signature                                                                  Date/Time                                                                                                                                              Patient or Representative Signature                                                          Date/Time     Inguinal Hernia Repair: What to Expect at [address] are likely to have pain for the next few days. You may also feel like you have the flu, and you may have a low fever and feel tired and nauseated. This is common. You should feel better after a few days and will probably feel much better in 7 days.   For several weeks you may feel twinges or pulling in the hernia repair when you move. You may have some bruising on the scrotum and along the penis. This is normal. Men will need to wear a jockstrap or briefs, not boxers, for scrotal support for several days after a groin (inguinal) hernia repair. Spandex bicycle shorts may provide good support. This care sheet gives you a general idea about how long it will take for you to recover. But each person recovers at a different pace. Follow the steps below to get better as quickly as possible. How can you care for yourself at home? Activity  · Rest when you feel tired. Getting enough sleep will help you recover. Sleep with your head up by using three or four pillows. You can also try to sleep with your head up in a recliner chair. Do not sleep on your side or stomach. · Try to walk each day. Start by walking a little more than you did the day before. Bit by bit, increase the amount you walk. Walking boosts blood flow and helps prevent pneumonia and constipation. · Put ice or a cold pack on the area of your hernia repair for 10 to 20 minutes at a time. Try to do this every 1 to 2 hours for the first 24 hours (when you are awake) or until the swelling goes down. Put a thin cloth between the ice and your skin. · Avoid strenuous activities, such as biking, jogging, weight lifting, or aerobic exercise, until your doctor says it is okay. · Avoid lifting anything that would make you strain. This may include heavy grocery bags and milk containers, a heavy briefcase or backpack, cat litter or dog food bags, a vacuum , or a child. · You may drive when you are no longer taking pain medicine and can quickly move your foot from the gas pedal to the brake. You must also be able to sit comfortably for a long period of time, even if you do not plan to go far. You might get caught in traffic. · Most people are able to return to work within 1 to 2 weeks after surgery.   · You may shower 24 to 48 hours after surgery, if your doctor okays it. Pat the cut (incision) dry. Do not take a bath for the first 2 weeks, or until your doctor tells you it is okay. · Your doctor will tell you when you can have sex again. Diet  · You can eat your normal diet. If your stomach is upset, try bland, low-fat foods like plain rice, broiled chicken, toast, and yogurt. · Drink plenty of fluids (unless your doctor tells you not to). · You may notice that your bowel movements are not regular right after your surgery. This is common. Avoid constipation and straining with bowel movements. You may want to take a fiber supplement every day. If you have not had a bowel movement after a couple of days, ask your doctor about taking a mild laxative. Medicines  · Take pain medicines exactly as directed. ¨ If the doctor gave you a prescription medicine for pain, take it as prescribed. ¨ If you are not taking a prescription pain medicine, take an over-the-counter medicine such as acetaminophen (Tylenol), ibuprofen (Advil, Motrin), or naproxen (Aleve). Read and follow all instructions on the label. ¨ Do not take two or more pain medicines at the same time unless the doctor told you to. Many pain medicines have acetaminophen, which is Tylenol. Too much acetaminophen (Tylenol) can be harmful. · If your doctor prescribed antibiotics, take them as directed. Do not stop taking them just because you feel better. You need to take the full course of antibiotics. · If you think your pain medicine is making you sick to your stomach:  ¨ Take your medicine after meals (unless your doctor has told you not to). ¨ Ask your doctor for a different pain medicine. Incision care  · Wash the area daily with warm, soapy water and pat it dry. Follow-up care is a key part of your treatment and safety. Be sure to make and go to all appointments, and call your doctor if you are having problems.  It's also a good idea to know your test results and keep a list of the medicines you take.  When should you call for help? Call 911 anytime you think you may need emergency care. For example, call if:  · You passed out (lost consciousness). · You have sudden chest pain and shortness of breath, or you cough up blood. · You have severe pain in your belly. Call your doctor now or seek immediate medical care if:  · You are sick to your stomach and cannot keep fluids down. · You have signs of a blood clot, such as:  ¨ Pain in your calf, back of the knee, thigh, or groin. ¨ Redness and swelling in your leg or groin. · You have trouble passing urine or stool, especially if you have mild pain or swelling in your lower belly. · Bright red blood has soaked through the bandage over your incision. Watch closely for any changes in your health, and be sure to contact your doctor if:  · Your swelling is getting worse. · Your swelling is not going down. Where can you learn more? Go to [employer]  Enter J408 in the search box to learn more about \"Hernia Repair: What to Expect at Home. \"   © 5522-2553 [employer]wise, Incorporated. Care instructions adapted under license by [facility] (which disclaims liability or warranty for this information). This care instruction is for use with your licensed healthcare professional. If you have questions about a medical condition or this instruction, always ask your healthcare professional. [address] any warranty or liability for your use of this information. Content Version: 9.0.77685; Last Revised: January 21, 2011      ______________________________________________________________________    Anesthesia Discharge Instructions    After general anesthesia or intervenous sedation, for 24 hours or while taking prescription Narcotics:  · Limit your activities  · Do not drive or operate hazardous machinery  · If you have not urinated within 8 hours after discharge, please contact your surgeon on call.   · Do not make important personal or business decisions  · Do not drink alcoholic beverages    Report the following to your surgeon:  · Excessive pain, swelling, redness or odor of or around the surgical area  · Temperature over 100.5 degrees  · Nausea and vomiting lasting longer than 4 hours or if unable to take medication  · Any signs of decreased circulation or nerve impairment to extremity:  Change in color, persistent numbness, tingling, coldness or increased pain.   · Any questions

## 2022-04-08 NOTE — BRIEF OP NOTE
Brief Postoperative Note    Patient: Ricardo Melchor. YOB: 1955  MRN: [id number]    Date of Procedure: 4/8/2022     Pre-Op Diagnosis: RECURRENT RIGHT INGUINAL HERNIA    Post-Op Diagnosis: Same as preoperative diagnosis. Procedure(s):  LAPAROSCOPIC REPAIR OF RECURRENT RIGHT INGUINAL HERNIA    Surgeon(s): Byron Valdez MD    Surgical Assistant: Physician Assistant: FEMI Arita    Anesthesia: General     Estimated Blood Loss (mL): 20 mL    Complications: None    Specimens: * No specimens in log *     Implants:   Implant Name Type Inv. Item Serial No.  Lot No. LRB No. Used Action   MESH JERMAINE [id number] TEXTILE MFIL POLYETH TEREPHTHALATE - SN/A  MESH JERMAINE [id number] TEXTILE MFIL POLYETH TEREPHTHALATE N/A [employer] US SURGICAL_WD [id number] Right 1 Implanted       Drains: * No LDAs found *    Findings: Recurrent indirect inguinal hernia. Evidence of metastatic disease seen within the peritoneum of the upper abdomen.       Electronically Signed by Mercedes Figueroa MD on 4/8/2022 at 12:43 PM

## 2022-04-08 NOTE — ANESTHESIA POSTPROCEDURE EVALUATION
Procedure(s):  LAPAROSCOPIC REPAIR OF RECURRENT RIGHT INGUINAL HERNIA.    general    Anesthesia Post Evaluation        Patient location during evaluation: PACU  Patient participation: complete - patient participated  Level of consciousness: awake and alert  Pain management: adequate  Airway patency: patent  Anesthetic complications: no  Cardiovascular status: acceptable  Respiratory status: acceptable  Hydration status: acceptable  Comments: I have seen and evaluated the patient and is ready for discharge. Julio Ogden MD    Post anesthesia nausea and vomiting:  none      INITIAL Post-op Vital signs:   Vitals Value Taken Time   /75 04/08/22 1245   Temp 36.5 °C (97.7 °F) 04/08/22 1229   Pulse 73 04/08/22 1247   Resp 13 04/08/22 1247   SpO2 94 % 04/08/22 1247   Vitals shown include unvalidated device data.

## 2022-04-08 NOTE — OP NOTES
[address]   OPERATIVE REPORT    Name:  Justen Wayne  MR#:  [id number]  :  1955  ACCOUNT #:  [de-identified]  DATE OF SERVICE:  2022    PREOPERATIVE DIAGNOSIS:  Recurrent right inguinal hernia. POSTOPERATIVE DIAGNOSIS:  Recurrent right inguinal hernia. PROCEDURE PERFORMED:  Laparoscopic repair of recurrent right inguinal hernia. SURGEON:  Maria L Wise. Anil Vincent MD    ASSISTANT:  FEMI Hurst. Suly Blanco's assistance was required secondary to the complex nature of this operation due to the recurrent nature of the hernia. She assisted throughout the entirety of the operation with operation of the camera as well as closure. ANESTHESIA:  General.    COMPLICATIONS:  None. SPECIMENS REMOVED:  None. IMPLANTS:  15 x 10 cm right anatomic laparoscopic ProGrip hernia mesh. ESTIMATED BLOOD LOSS:  20 mL. DISPOSITION:  PACU. FINDINGS:  The patient had a recurrent indirect inguinal hernia that was spontaneously reduced. There was evidence of metastatic disease seen within the peritoneum in the upper abdomen consistent with his known disease. INDICATIONS:  The patient is a 71-year-old gentleman with a history of metastatic cholangiocarcinoma. He had recently stopped aggressive therapy, but had complaints of severe pain from the right inguinal hernia that was recurrent in nature. He was concerned with quality of life and wished to have this repaired due to the pain despite his poor prognosis from cancer standpoint. A complete discussion of risks, benefits and alternatives of surgery were had with the patient, and he wished to proceed with repair of the right inguinal hernia. We discussed the laparoscopic approach given that his previous repair was performed through an open anterior approach. Informed consent was obtained. PROCEDURE:  After informed consent was obtained, the patient was brought back to the operating room.   He was placed under general endotracheal anesthesia in the supine position on the operating room table. His arms were tucked bilaterally. His abdomen was then prepped and draped in the usual sterile fashion. A proper time-out was performed. The right side had been marked preoperatively, and this was confirmed at this time. With this completed, we made a 5 mm transverse incision just beneath the umbilicus after injection of local anesthetic. We dissected down to the base of the umbilicus using a Kocher clamp, and this was grasped and retracted anteriorly. A Veress needle was passed through this site into the abdomen, and a standard water drop technique test was performed. The abdomen was then insufflated to 15 mmHg. The Veress needle was then removed, and a 5 mm Optiview trocar was used to tunnel into the abdomen with the scope inserted. The abdomen was entered safely. We then placed a right midabdominal 5 mm trocar and a left midabdominal 12 mm trocar after injection of local anesthetic and under direct visualization. The patient was placed in Trendelenburg position. Evaluation of the pelvis revealed a recurrent indirect right inguinal hernia. There was no direct component noted. Of note, cursory evaluation of the abdomen did reveal copious adhesions in the upper abdomen, but there was evidence of peritoneal metastatic disease as it is known from his preoperative imaging. The peritoneum was then incised in the lower abdomen from the level of the anterior superior iliac spine over towards the midline. A peritoneal flap was then created dissecting the peritoneum down away from the abdominal wall using a combination of blunt dissection as well as scissors with cautery. We dissected down medially first going towards the Wayne's ligament and pubic tubercle, and this was nicely exposed. We then dissected out lateral to the hernia in a similar fashion.   There was a copious amount of adhesions from the previous repair of the hernia sac as we were attempting to reduce this, and there were some tears made in the peritoneum in trying to get this dissected out. We took care to preserve the inferior epigastric artery as well as the vascular structures and vas deferens going into the inguinal canal.  Ultimately, we were able to get this completely reduced. A 15 x 10 cm right anatomic laparoscopic inguinal hernia ProGrip mesh was then introduced into the abdomen and positioned in appropriate location with the mesh centered over the hernia defect. This was pressed into the tissues for self fixation. We then used a SecureStrap absorbable tacking device to place tacks at the level of Wayne's ligament as well as in the anterior abdominal wall just above the hernia and lateral to the inferior epigastric artery as well. The mesh was in excellent position, and we pulled the peritoneum up over the mesh to cover this. This was reaffixed to the anterior abdominal wall using the absorbable tacking device. The tears in the peritoneum were then reapproximated using a laparoscopic hernia stapling device, which nicely closed these defects. There was no exposed mesh at the completion of the case. Good hemostasis was maintained throughout, and we thoroughly evaluated the peritoneal flap, and this was nicely hemostatic and provided excellent coverage of the mesh. We then removed the 12 mm trocar, and the fascia was closed with an 0 Vicryl suture on a suture passer at this site. The abdomen was then desufflated. The remaining trocars were removed. The skin was closed using 4-0 Monocryl subcuticular stitches followed by Dermabond skin adhesive. Additional local anesthetic was injected at all incision sites. The patient was then awakened, extubated and taken to the postanesthesia care unit in stable condition. All needle and instrument counts were correct at the completion of the case. I was present and scrubbed throughout the entirety of the case. There were no immediate complications.       Jess Mcneal MD      [id number]  D:  04/08/2022 12:58  T:  04/08/2022 16:48  JOB #:  [id number]

## 2022-04-08 NOTE — H&P
Date of Surgery Update:  Markel Mckeon was seen and examined. History and physical has been reviewed. The patient has been examined. There have been no significant clinical changes since the completion of the originally dated History and Physical.  Patient with recurrent symptomatic right inguinal hernia. He does have metastatic cholangiocarcinoma but is adamant about having this fixed due to pain and quality of life concerns. He had a prior open repair so will attempt a laparoscopic right inguinal hernia repair approach. A complete discussion of the risks, benefits and alternatives to surgery were discussed with the patient who was keen to proceed. His thrombocytopenia has improved off chemotherapy. The patient was counseled at length about the risks of sofía Covid-19 during their perioperative period and any recovery window from their procedure. The patient was made aware that sofía Covid-19  may worsen their prognosis for recovering from their procedure and lend to a higher morbidity and/or mortality risk. All material risks, benefits, and reasonable alternatives including postponing the procedure were discussed. The patient does wish to proceed with the procedure at this time. Signed By: Emily Oconnell MD     April 8, 2022 10:26 AM         Please note from the office and include the additional information below:    Past Medical History  Past Medical History:   Diagnosis Date    Aneurysm ([address] 2008    CEREBRAL    Arrhythmia     Previous a.fib, ablation, NSR now; NO LONGER FOLLOWED BY CARDIOLOGIST.     Arthritis     Autoimmune disease ([address]     SJOGREN'S    Calculus of kidney 2018    Cancer ([address] 2020    COMMON BILE DUCT, ADENOCARCINOMA, SPINE    Diabetes ([address]     NIDDM    Family history of skin cancer     Hypertension     Sepsis ([address] 2017    STENTING TO BILE DUCT    Status post chemotherapy     Stroke [facility]) 2008    brain aneurysm - no deficits    Sun-damaged skin     Sunburn, blistering         Past Surgical History  Past Surgical History:   Procedure Laterality Date    HX CHOLECYSTECTOMY  2017    HX COLONOSCOPY  6/4/18    HX GI      COLONOSCOPY, POLYPS (BENIGN)    HX GI  10/06/2017    Viktor Marino [facility]     HX GI  2020    WHIPPLE REVISION    HX HEART CATHETERIZATION  2005    Ablation     HX HERNIA REPAIR  12/2020    HERNIA REPAIR    HX LUMBAR FUSION  2005    HX ORTHOPAEDIC  2000    Spinal fusion W/ HARDWARE    HX OTHER SURGICAL  11/07/2017    [place] Cath, Dr. Ciara Mccoy HX OTHER SURGICAL  01/11/2021    RIGHT IJ PORT-A-CATH PLACEMENT     HX VASCULAR ACCESS  2017    PORTACATH - then removed    HX VASCULAR ACCESS Right 2020    PORTACATH    IR ABLATE BONE TUMOR [address]  12/23/2021    IR KYPHOPLASTY LUMBAR  1/12/2021    NEUROLOGICAL PROCEDURE UNLISTED  2008    Clark hole washout from cerebral hemorrhage    NEUROLOGICAL PROCEDURE UNLISTED  2020, 2021    KYPHOPLASTY    CA ABDOMEN SURGERY PROC UNLISTED  10/2017    WHIPPLE    CA ABDOMEN SURGERY PROC UNLISTED  2020    WHIPPLE REVISION        Social History  The patient Reyna Sanchez  reports that he has never smoked. He has never used smokeless tobacco. He reports current alcohol use. He reports that he does not use drugs.      Family History  Family History   Problem Relation Age of Onset    Cancer Father         Breast and Colon, MELANOMA    Hypertension Father     Diabetes Father     Cancer Mother         LEUKEMIA    No Known Problems Sister     Atrial Fibrillation Brother     No Known Problems Sister     No Known Problems Son     No Known Problems Son    Stephanie.College Anesth Problems Neg Hx           Ana Rosa Barillas MD

## 2022-04-14 NOTE — PROGRESS NOTES
Pharmacy Progress Note - Diabetes Management    Assessment / Plan:   Diabetes Management:  - Per ADA guidelines, Pt's A1c is not at goal of < 8%.   - Recent BGM remains elevated for fasting goals  - Start Tresiba 16 units daily. Patient came into office later for insulin teaching. He was able to confirm understanding with teach back method. - Continue glipizide 5 mg daily and Jardiance 25 mg daily for now. - Emphasize importance of monitoring   - Review s/sx of hypoglycemia and management steps. S/O: Mr. Hilario Parish is a 73 y.o. male, referred by Dr. 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 Patient's last A1c was 9.5% (March 2022), 6% (Aug 2021) . Interim update:   S/p hernia repair on 4/8/22     Current anti-hyperglycemic regimen include(s):    - Jardiance 25 mg daily  - Glipizide 5 mg daily    Denies missed doses   Holding off on metformin    ROS:  Today, Pt endorses:  - Symptoms of Hyperglycemia: fatigue  - Symptoms of Hypoglycemia: none    Blood Glucose Monitoring (BGM) or CGM:  Reports:   Date Before Breakfast   3/30/2022 142   3/31/2022 193   4/1/2022 194   4/2/2022 203   4/3/2022 241   4/4/2022 219   4/5/2022 194   4/6/2022 199   4/7/2022 278   4/8/2022 277   4/9/2022 204   4/10/2022 216   4/11/2022 337   4/12/2022 238   4/13/2022 288   4/14/2022 202     The ASCVD Risk score (Geovany Mcintosh, et al., 2013) failed to calculate for the following reasons: The valid total cholesterol range is 130 to 320 mg/dL     Vitals:   Wt Readings from Last 3 Encounters:   04/08/22 136 lb 14.5 oz (62.1 kg)   04/01/22 136 lb 14.5 oz (62.1 kg)   03/29/22 140 lb (63.5 kg)     BP Readings from Last 3 Encounters:   04/14/22 126/81   04/08/22 (!) 151/92   04/01/22 126/79     Pulse Readings from Last 3 Encounters:   04/08/22 72   04/01/22 80   03/29/22 70 Past Medical History:   Diagnosis Date    Aneurysm [facility]) 2008    CEREBRAL    Arrhythmia     Previous a.fib, ablation, NSR now; NO LONGER FOLLOWED BY CARDIOLOGIST.  Arthritis     Autoimmune disease ([facility]     SJOGREN'S    Calculus of kidney 2018    Cancer ([facility] 2020    COMMON BILE DUCT, ADENOCARCINOMA, SPINE    Diabetes ([facility]     NIDDM    Family history of skin cancer     Hypertension     [facility] 2017    STENTING TO BILE DUCT    Status post chemotherapy     Stroke [facility]) 2008    brain aneurysm - no deficits    Sun-damaged skin     Sunburn, blistering      Allergies   Allergen Reactions    Shellfish Derived Anaphylaxis     Soft shell crabs    Morphine Nausea and Vomiting    Pcn [Penicillins] Other (comments)     Pt stated \"always been told PCN\"  Pt tolerated other cephalosporins in the past       Current Outpatient Medications   Medication Sig    insulin degludec (Tresiba FlexTouch U-200) 200 unit/mL (3 mL) inpn pen 16 Units by SubCUTAneous route daily.  HYDROmorphone (DILAUDID) 8 mg tablet Take 1 Tablet by mouth every three (3) hours as needed for Pain for up to 15 days. Max Daily Amount: 64 mg.  empagliflozin (Jardiance) 25 mg tablet Take 25 mg by mouth daily.  famotidine (PEPCID) 20 mg tablet Take 20 mg by mouth two (2) times daily as needed for Heartburn.  methadone (DOLOPHINE) 10 mg tablet Take 2 Tablets by mouth two (2) times a day AND 3 Tablets nightly. Do all this for 30 days. Max Daily Amount: 70 mg.    glipiZIDE (GLUCOTROL) 5 mg tablet TAKE 1 TABLET BY MOUTH DAILY WITH BREAKFAST    phenylephrine hcl 10 mg tab Take 10 mg by mouth daily.  prochlorperazine (COMPAZINE) 10 mg tablet Take 10 mg by mouth every six (6) hours as needed.  furosemide (Lasix) 40 mg tablet Take 1 Tablet by mouth every Monday, Wednesday, Friday.  diclofenac (VOLTAREN) 1 % gel Apply  to affected area four (4) times daily.  (Patient taking differently: Apply  to affected area daily as needed for Pain.)    lidocaine (LIDODERM) 5 % 1 Patch by TransDERmal route every twenty-four (24) hours. Apply patch to the affected area for 12 hours a day and remove for 12 hours a day. (Patient taking differently: 1 Patch by TransDERmal route daily as needed for Pain. Apply patch to the affected area for 12 hours a day and remove for 12 hours a day.)    ferrous sulfate 325 mg (65 mg iron) tablet Take 325 mg by mouth Daily (before breakfast).  dutasteride (AVODART) 0.5 mg capsule TAKE ONE CAPSULE BY MOUTH DAILY    tamsulosin (FLOMAX) 0.4 mg capsule TAKE 1 CAPSULE BY MOUTH ONCE EVERY EVENING    ondansetron (Zofran ODT) 4 mg disintegrating tablet Take 1 Tablet by mouth every eight (8) hours as needed for Nausea.  Creon 36,000-114,000- 180,000 unit cpDR capsule TAKE TWO TO THREE CAPSULES BY MOUTH WITH EACH MEAL AND ONE CAPSULE FOR SNACKS DAILY (Patient taking differently: Take 1-3 Capsules by mouth See Admin Instructions. 3 capsules before each meals and 1 capsule before snack)    vitamin A (AQUASOL A) 10,000 unit capsule TAKE 1 CAPSULE BY MOUTH ONE TIME A DAY    aluminum & magnesium hydroxide-simethicone (Maalox Maximum Strength) 400-400-40 mg/5 mL suspension Take 10 mL by mouth every six (6) hours as needed for Indigestion.  vitamin E (AQUA GEMS) 400 unit capsule Take 1 Cap by mouth daily.  alpha-D-galactosidase (BEANO PO) Take 1 Tablet by mouth three (3) times daily.  cetirizine (ZYRTEC) 10 mg tablet Take 10 mg by mouth nightly. No current facility-administered medications for this visit.        Lab Results   Component Value Date/Time    Sodium 136 03/23/2022 09:10 AM    Potassium 2.8 (L) 03/23/2022 09:10 AM    Chloride 103 03/23/2022 09:10 AM    CO2 26 03/23/2022 09:10 AM    Anion gap 7 03/23/2022 09:10 AM    Glucose 241 (H) 03/23/2022 09:10 AM    BUN 14 03/23/2022 09:10 AM    Creatinine 1.05 03/23/2022 09:10 AM    BUN/Creatinine ratio 13 03/23/2022 09:10 AM    GFR est AA >60 03/23/2022 09:10 AM    GFR est non-AA >60 2022 09:10 AM    Calcium 7.4 (L) 2022 09:10 AM    Bilirubin, total 0.9 2022 09:10 AM    Alk. phosphatase 128 (H) 2022 09:10 AM    Protein, total 5.7 (L) 2022 09:10 AM    Albumin 3.2 (L) 2022 09:10 AM    Globulin 2.5 2022 09:10 AM    A-G Ratio 1.3 2022 09:10 AM    ALT (SGPT) 13 2022 09:10 AM       Lab Results   Component Value Date/Time    Cholesterol, total 82 2021 12:13 PM    HDL Cholesterol 44 2021 12:13 PM    LDL, calculated 24.6 2021 12:13 PM    VLDL, calculated 13.4 2021 12:13 PM    Triglyceride 67 2021 12:13 PM    CHOL/HDL Ratio 1.9 2021 12:13 PM       Lab Results   Component Value Date/Time    WBC 6.3 2022 09:30 AM    Hemoglobin (POC) 10.3 (L) 10/06/2017 01:14 PM    HGB 9.7 (L) 2022 09:30 AM    Hematocrit (POC) 28 (L) 2021 09:16 AM    HCT 30.8 (L) 2022 09:30 AM    PLATELET 99 (L)  09:30 AM    MCV 90.9 2022 09:30 AM       Lab Results   Component Value Date/Time    Microalbumin/Creat ratio (mg/g creat) 9 11/10/2021 08:52 AM    Microalbumin,urine random 0.73 11/10/2021 08:52 AM       HbA1c:  Lab Results   Component Value Date/Time    Hemoglobin A1c 9.0 (H) 2022 09:06 AM    Hemoglobin A1c (POC) 9.5 (A) 2022 04:13 PM     No components found for: 2     Last Point of Care HGB A1C  Hemoglobin A1c (POC)   Date Value Ref Range Status   2022 9.5 (A) 4.8 - 5.6 % Final        Estimated Creatinine Clearance: 61.6 mL/min (by C-G formula based on SCr of 1.05 mg/dL). Medication reconciliation was completed during the visit. There are no discontinued medications. Orders Placed This Encounter    insulin degludec Dorita Hernandez FlexTouch U-200) 200 unit/mL (3 mL) inpn pen     Si Units by SubCUTAneous route daily.  Indications: type 2 diabetes mellitus     Dispense:  1 Pen     Refill:  0    Insulin Needles, Disposable, 32 gauge x \" ndle Si Each by Does Not Apply route See Admin Instructions. Use to give insulin under the skin daily. E11.65     Dispense:  100 Pen Needle     Refill:  11     Patient verbalized understanding of the information presented and all of the patients questions were answered. AVS was handed to the patient. Patient advised to call the office with any additional questions or concerns. Notifications of recommendations will be sent to Dr. Claritza Butler DO for review. Patient will return to clinic in 2 week(s) for follow up. Thank you for the consult,  Kasey Robles, PharmD, BCACP, [address] [place] [address] in place:  Yes   Recommendation Provided To: Patient/Caregiver: 5 via In person and Virtual Visit   Intervention Detail: Device Training, New Rx: 2, reason: Needs Additional Therapy and Patient Preference, Patient Access Assistance/Sample Provided and Scheduled Appointment   Intervention Accepted By: Patient/Caregiver: 5   Time Spent (min): 30

## 2022-04-14 NOTE — PATIENT INSTRUCTIONS
· Start Tresiba 16 units daily. Give around the same time every day  · Continue Jardiance 25 mg daily. Stay hydrated  · Continue glipizide 5 mg daily with food  · Continue to monitor your blood sugar --- fasting and pre bedtime    When you experience symptoms of low blood sugar (example: less than 70):  - Confirm low reading by checking your blood sugar.   - Then treat with 15 grams of carbohydrates (one-half cup of juice or regular soda, or 4-5 glucose tablets).   - Wait 15 minutes to recheck blood sugar.   - Then eat a protein containing meal/snack to prevent another low blood sugar episode. (example: peanut butter + crackers)

## 2022-04-19 NOTE — TELEPHONE ENCOUNTER
----- Message from Pam Mckenna sent at 4/19/2022  3:59 PM EDT -----  Subject: Appointment Request    Reason for Call: Routine Hospital Follow Up    QUESTIONS  Type of Appointment? New Patient/New to Provider  Reason for appointment request? No appointments available during search  Additional Information for Provider? Aman Patel with [facility]   [address] calling to schedule hospital f/u. Pt was admitted for   hypoglycemia 4/16-4/19/22.  ---------------------------------------------------------------------------  --------------  CALL BACK INFO  What is the best way for the office to contact you? OK to leave message on   voicemail  Preferred Call Back Phone Number? [phone]  ---------------------------------------------------------------------------  --------------  SCRIPT ANSWERS  Relationship to Patient? Third Party  Third Party Type? Hospital?   Representative Name? Roro Dasilva  (Patient requests to see provider urgently. )? No  (Has the patient been discharged from the hospital within 2 business days   AND does not have a Telephone Encounter  Follow Up From [address] [place] [address]   documented in [address]?)? No  Do you have any questions for your primary care provider that need to be   answered prior to your appointment? (Use RN Triage if question pertains to   anything on the red flag list)? No  (Patient needs follow up visit after hospital discharge) Book first   available appointment within 7 days OF DISCHARGE, if no appt, proceed to   book the next available time slot within 14 days OF DISCHARGE AND Send   Message to Provider. [facility] Follow Up appointment cannot be booked   beyond 14 Days and should result in a Message to Provider. ? Yes   Have you been diagnosed with, awaiting test results for, or told that you   are suspected of having COVID-19 (Coronavirus)? (If patient has tested   negative or was tested as a requirement for work, school, or travel and   not based on symptoms, answer no)?  No  Within the past 10 days have you developed any of the following symptoms   (answer no if symptoms have been present longer than 10 days or began   more than 10 days ago)? Fever or Chills, Cough, Shortness of breath or   difficulty breathing, Loss of taste or smell, Sore throat, Nasal   congestion, Sneezing or runny nose, Fatigue or generalized body aches   (answer no if pain is specific to a body part e.g. back pain), Diarrhea,   Headache? No  Have you had close contact with someone with COVID-19 in the last 7 days? No  (Service Expert  click yes below to proceed with [employer]ual   Scheduling)?  Yes

## 2022-04-20 NOTE — TELEPHONE ENCOUNTER
----- Message from Amil January sent at 4/19/2022  4:01 PM EDT -----  Subject: Message to Provider    QUESTIONS  Information for Provider? Per Mortimer Manus, call the patients yuni Orellana back to   schedule please   ---------------------------------------------------------------------------  --------------  CALL BACK INFO  What is the best way for the office to contact you? OK to leave message on   voicemail  Preferred Call Back Phone Number? [phone]  ---------------------------------------------------------------------------  --------------  SCRIPT ANSWERS  Relationship to Patient? Third Party  Third Party Type?  Hospital?   Representative Name? Jose Freeman

## 2022-04-21 NOTE — TELEPHONE ENCOUNTER
Two patient identifiers used. Called patient to see if he wanted to reschedule his appointment from this morning. Patient in agreement. Patient scheduled to see Dr. Gianluca Sandoval on Tuesday, 4/26/2022 @ 3pm as a virtual visit. Patient in agreement with date and time.

## 2022-05-04 ENCOUNTER — APPOINTMENT (OUTPATIENT)
Dept: INFUSION THERAPY | Age: 67
End: 2022-05-04

## 2022-05-05 ENCOUNTER — APPOINTMENT (OUTPATIENT)
Dept: INFUSION THERAPY | Age: 67
End: 2022-05-05

## 2022-05-06 ENCOUNTER — APPOINTMENT (OUTPATIENT)
Dept: INFUSION THERAPY | Age: 67
End: 2022-05-06

## 2022-05-18 ENCOUNTER — APPOINTMENT (OUTPATIENT)
Dept: INFUSION THERAPY | Age: 67
End: 2022-05-18

## 2022-05-20 ENCOUNTER — APPOINTMENT (OUTPATIENT)
Dept: INFUSION THERAPY | Age: 67
End: 2022-05-20

## 2022-06-01 ENCOUNTER — APPOINTMENT (OUTPATIENT)
Dept: INFUSION THERAPY | Age: 67
End: 2022-06-01

## 2022-06-03 ENCOUNTER — APPOINTMENT (OUTPATIENT)
Dept: INFUSION THERAPY | Age: 67
End: 2022-06-03

## 2022-06-15 ENCOUNTER — APPOINTMENT (OUTPATIENT)
Dept: INFUSION THERAPY | Age: 67
End: 2022-06-15

## 2022-06-17 ENCOUNTER — APPOINTMENT (OUTPATIENT)
Dept: INFUSION THERAPY | Age: 67
End: 2022-06-17

## 2022-06-29 ENCOUNTER — APPOINTMENT (OUTPATIENT)
Dept: INFUSION THERAPY | Age: 67
End: 2022-06-29

## 2022-07-01 ENCOUNTER — APPOINTMENT (OUTPATIENT)
Dept: INFUSION THERAPY | Age: 67
End: 2022-07-01

## 2023-04-04 NOTE — PROGRESS NOTES
Intensivist    Patient was admitted to the intensive care unit for management of sepsis. Patient has obstructive jaundice and biliary stricture. He underwent ERCP and had his biliary stent replaced yesterday. This procedure has had fevers and hypotension. Central line is in place he has been on broad antibiotics with aztreonam Flagyl and vancomycin. He's also received IV fluids. He states he is feeling better today. His pain is down to a 5-10 in his abdomen. He says he feels hot but his temperature is down. Blood cultures have been sent and showed no growth at this time. Allergies   Allergen Reactions    Pcn [Penicillins] Other (comments)     Pt stated \"always been told PCN\"     Past Medical History:   Diagnosis Date    Autoimmune disease ([facility]     Hypertension     Ill-defined condition     Previous a.fib, ablation, NSR now     Past Surgical History:   Procedure Laterality Date    CARDIAC SURG PROCEDURE UNLIST      Ablation 2005    HX ORTHOPAEDIC      Spinal fusion     NEUROLOGICAL PROCEDURE UNLISTED  2005    Gwenette Hung hole washout from cerebral hemorrhage     Prior to Admission medications    Medication Sig Start Date End Date Taking? Authorizing Provider   gabapentin (NEURONTIN) 300 mg capsule Take 900 mg by mouth two (2) times a day. 3 x 300mg caps (900mg) twice daily   Yes Historical Provider   ramipril (ALTACE) 10 mg capsule Take 10 mg by mouth daily. Yes Flynn Mohan MD   cholecalciferol (VITAMIN D3) 1,000 unit cap Take 1,000 Units by mouth daily. Yes Flynn Mohan MD   cholestyramine-sucrose (QUESTRAN) 4 gram powder Take 4 g by mouth two (2) times a day. Take 1 hour before other medications or meals 7/28/17   Natasha Levy MD   cyanocobalamin (VITAMIN B-12) 1,000 mcg/mL injection 1,000 mcg by IntraMUSCular route once.  Indications: VITAMIN B12 DEFICIENCY, Twice a month    Flynn Mohan MD     Social History     Social History    Marital status:      Spouse name: N/A    Number of children: N/A  Years of education: N/A     Occupational History    Not on file. Social History Main Topics    Smoking status: Never Smoker    Smokeless tobacco: Never Used    Alcohol use No    Drug use: Yes     Special: Prescription, OTC    Sexual activity: Not on file     Other Topics Concern    Not on file     Social History Narrative     Patient Vitals for the past 4 hrs:   Pulse Resp BP SpO2   08/25/17 0730 88 18 (!) 83/51 94 %   08/25/17 0700 92 16 95/61 95 %   08/25/17 0630 93 18 (!) 89/61 93 %   08/25/17 0600 97 23 93/82 94 %       Exam:  Alert in no acute distress  Right IJ catheter in place  Mildly icteric sclera  Mucous membranes slightly dry  Lungs are clear  Regular rate and rhythm  No guarding or rebound  Warm and dry    CXR-low volumes with basilar atelectasis    Lab:  Recent Labs      08/25/17   0418  08/25/17   0015  08/25/17   0013   WBC  18.4*   --   3.7*   HGB  13.0   --   11.9*   PLT  74*   --   51*   NA  136   --   135*  139   K  3.2*   --   3.4*  2.9*   CL  105   --   104  107   CO2  17*   --   18*  18*   BUN  30*   --   26*  25*   CREA  2.92*   --   2.51*  2.29*   GLU  174*   --   147*  134*   CA  6.9*   --   7.0*  6.6*   MG  1.4*   --    --    PHOS   --    --   1.6*   TROIQ   --   <0.04   --    TBILI  8.6*   --   7.9*  7.1*   SGOT  125*   --   133*  115*   LAC  4.5*   --   5.2*     All Micro Results     Procedure Component Value Units Date/Time    CULTURE, BLOOD, PAIRED [[id number]] Collected:  08/25/17 0013    Order Status:  Completed Specimen:  Blood Updated:  08/25/17 0619    CULTURE, BLOOD [[id number]]     Order Status:  Sent Specimen:  Blood from Blood     CULTURE, BLOOD [[id number]]     Order Status:  Sent Specimen:  Blood from Blood         Impression:    1. Septic shock-biliary following ERCP with biopsy of biliary stricture and placement of a biliary stent. On Levophed for blood pressure support. Lactic acid level is decreasing.   2. Acute kidney injury  3. Hyperglycemia  4.  Leukocytosis    --continue broad antibiotics  --Followup cultures  --Volume expansion  --Monitor hepatic and renal functions  --ICU protocols    Coverage to see this weekend if needed    Saint Crews, MD straight cane

## 2023-05-19 NOTE — ED NOTES
Addended by: MICHELLE ADLER on: 5/19/2023 10:23 AM     Modules accepted: Orders     Pt ambulatory to room from triage. Pt here today with complaints of 9/10 sharp midabd pain that radiates into the back. PT states that he also has had nasuea and vomiting since today. PT states that this all feels similar to the times he has had pancreatitis. PT states that he had a whipple done in 2017 and has had pancreatitis multiple times since then. PT denies chest pain, sob, diarrhea, fever, chills. Pt ANOX4, respirations even and unlabored, skin warm dry and intact, NAD noted.

## 2024-06-05 NOTE — DISCHARGE INSTRUCTIONS
[address]  [address]  [id number]  1955    DISCOMFORT:  Sore throat- throat lozenges or warm salt water gargle  redness at IV site- apply warm compress to area; if redness or soreness persist- contact your physician  Gaseous discomfort- walking, belching will help relieve any discomfort    DIET  You may eat and drink after you leave. You may resume your regular diet - however -  remember your colon is empty and a heavy meal will produce gas. Avoid these foods:  vegetables, fried / greasy foods, carbonated drinks   You may not drink alcoholic beverages for at least 12 hours    ACTIVITY  You may resume your normal daily activities   Spend the remainder of the day resting -  avoid any strenuous activity. You may not operate a vehicle for 12 hours  You may not engage in an occupation involving machinery or appliances for rest of today  Avoid making any critical decisions for at least 24 hour    CALL M.D. ANY SIGN OF   Increasing pain, nausea, vomiting  Abdominal distension (swelling)  New increased bleeding (oral or rectal)  Fever (chills)  Pain in chest area  Bloody discharge from nose or mouth  Shortness of breath    Follow-up Instructions:   Call Dr. Concha Steel for any questions or problems. If we took a biopsy please call the office within 2 weeks to discuss your pathology results. Telephone # [phone]       ENDOSCOPY FINDINGS:   Pancreatic anastomostic stricture         Learning About Coronavirus (COVID-19)  Coronavirus (COVID-19): Overview  What is coronavirus ([id number])? The coronavirus disease (COVID-19) is caused by a virus. It is an illness that was first found in [place], [place], in December 2019. It has since spread worldwide. The virus can cause fever, cough, and trouble breathing. In severe cases, it can cause pneumonia and make it hard to breathe without help.  It can cause death. Coronaviruses are a large group of viruses. They cause the common cold. They also cause more serious illnesses like Middle East respiratory syndrome (MERS) and severe acute respiratory syndrome (SARS). COVID-19 is caused by a novel coronavirus. That means it's a new type that has not been seen in people before. This virus spreads person-to-person through droplets from coughing and sneezing. It can also spread when you are close to someone who is infected. And it can spread when you touch something that has the virus on it, such as a doorknob or a tabletop. What can you do to protect yourself from coronavirus (COVID-19)? The best way to protect yourself from getting sick is to:  · Avoid areas where there is an outbreak. · Avoid contact with people who may be infected. · Wash your hands often with soap or alcohol-based hand sanitizers. · Avoid crowds and try to stay at least 6 feet away from other people. · Wash your hands often, especially after you cough or sneeze. Use soap and water, and scrub for at least 20 seconds. If soap and water aren't available, use an alcohol-based hand . · Avoid touching your mouth, nose, and eyes. What can you do to avoid spreading the virus to others? To help avoid spreading the virus to others:  · Cover your mouth with a tissue when you cough or sneeze. Then throw the tissue in the trash. · Use a disinfectant to clean things that you touch often. · Stay home if you are sick or have been exposed to the virus. Don't go to school, work, or public areas. And don't use public transportation. · If you are sick:  ? Leave your home only if you need to get medical care. But call the doctor's office first so they know you're coming. And wear a face mask, if you have one.  ? If you have a face mask, wear it whenever you're around other people. It can help stop the spread of the virus when you cough or sneeze. ? Clean and disinfect your home every day.  Use household  and disinfectant wipes or sprays. Take special care to clean things that you grab with your hands. These include doorknobs, remote controls, phones, and handles on your refrigerator and microwave. And don't forget countertops, tabletops, bathrooms, and computer keyboards. When to call for help  Call 911 anytime you think you may need emergency care. For example, call if:  · You have severe trouble breathing. (You can't talk at all.)  · You have constant chest pain or pressure. · You are severely dizzy or lightheaded. · You are confused or can't think clearly. · Your face and lips have a blue color. · You pass out (lose consciousness) or are very hard to wake up. Call your doctor now if you develop symptoms such as:  · Shortness of breath. · Fever. · Cough. If you need to get care, call ahead to the doctor's office for instructions before you go. Make sure you wear a face mask, if you have one, to prevent exposing other people to the virus. Where can you get the latest information? The following health organizations are tracking and studying this virus. Their websites contain the most up-to-date information. Genesis Randle also learn what to do if you think you may have been exposed to the virus. · U.S. Centers for Disease Control and Prevention (CDC): The CDC provides updated news about the disease and travel advice. The website also tells you how to prevent the spread of infection. www.cdc.gov  · World Health Organization [facility]): WHO offers information about the virus outbreaks. WHO also has travel advice. www.who.int  Current as of: April 1, 2020               Content Version: 12.4  © 3233-1799 [employer].    Care instructions adapted under license by your healthcare professional. If you have questions about a medical condition or this instruction, always ask your healthcare professional. [address] any warranty or liability for your use of this information. Detail Level: Zone Plan: Start  MG BID.

## 2025-06-23 NOTE — PERIOP NOTES

## 2025-07-10 NOTE — PATIENT INSTRUCTIONS
Learning About High Blood Sugar  What is high blood sugar? Your body turns the food you eat into glucose (sugar), which it uses for energy. But if your body isn't able to use the sugar right away, it can build up in your blood and lead to high blood sugar. When the amount of sugar in your blood stays too high for too much of the time, you may have diabetes. Diabetes is a disease that can cause serious health problems. The good news is that lifestyle changes may help you get your blood sugar back to normal and avoid or delay diabetes. What causes high blood sugar? Sugar (glucose) can build up in your blood if you:  · Are overweight. · Have a family history of diabetes. · Take certain medicines, such as steroids. What are the symptoms? Having high blood sugar may not cause any symptoms at all. Or it may make you feel very thirsty or very hungry. You may also urinate more often than usual, have blurry vision, or lose weight without trying. How is high blood sugar treated? You can take steps to lower your blood sugar level if you understand what makes it get higher. Your doctor may want you to learn how to test your blood sugar level at home. Then you can see how illness, stress, or different kinds of food or medicine raise or lower your blood sugar level. Other tests may be needed to see if you have diabetes. How can you prevent high blood sugar? · Watch your weight. If you're overweight, losing just a small amount of weight may help. Reducing fat around your waist is most important. · Limit the amount of calories, sweets, and unhealthy fat you eat. Ask your doctor if a dietitian can help you. A registered dietitian can help you create meal plans that fit your lifestyle. · Get at least 30 minutes of exercise on most days of the week. Exercise helps control your blood sugar. It also helps you maintain a healthy weight. Walking is a good choice.  You also may want to do other activities, such as PLAN:  Provider's Office Contact/Discuss with your PCP     1621: [facility] called and put on hold. Informed RN was on a call. Call was never connected to a nurse after over 20 minutes.   1643: [facility] called, no answer.      A [employer] page was sent to Dr. Angela Reyna     The page included the following message: Medication Issue      Outcome: Provider orders:  Stop taking Metformin. DO will call patient tomorrow to discuss alternative medication and options.       Patient updated on care advice.      Patient/Caller agrees to follow recommendations.  ______________________________________   running, swimming, cycling, or playing tennis or team sports. · If your doctor prescribed medicines, take them exactly as prescribed. Call your doctor if you think you are having a problem with your medicine. You will get more details on the specific medicines your doctor prescribes. Follow-up care is a key part of your treatment and safety. Be sure to make and go to all appointments, and call your doctor if you are having problems. It's also a good idea to know your test results and keep a list of the medicines you take. Where can you learn more? Go to http://sandra-jenelle.info/. Enter O108 in the search box to learn more about \"Learning About High Blood Sugar. \"  Current as of: March 13, 2017  Content Version: 11.4  © 4674-0978 [employer]. Care instructions adapted under license by [employer] (which disclaims liability or warranty for this information). If you have questions about a medical condition or this instruction, always ask your healthcare professional. [address] any warranty or liability for your use of this information.

## (undated) DEVICE — TOTAL TRAY, 16FR 10ML SIL FOLEY, URN: Brand: MEDLINE

## (undated) DEVICE — REM POLYHESIVE ADULT PATIENT RETURN ELECTRODE: Brand: VALLEYLAB

## (undated) DEVICE — Device: Brand: SINGLE USE SOFT BRUSH

## (undated) DEVICE — BLADE ASSEMB CLP HAIR FINE --

## (undated) DEVICE — SOLUTION IV 1000ML 0.9% SOD CHL

## (undated) DEVICE — (D)SYR 10ML 1/5ML GRAD NSAF -- PKGING CHANGE USE ITEM 338027

## (undated) DEVICE — STRAP,POSITIONING,KNEE/BODY,FOAM,4X60": Brand: MEDLINE

## (undated) DEVICE — ROCKER SWITCH PENCIL BLADE ELECTRODE, HOLSTER: Brand: EDGE

## (undated) DEVICE — INTENDED FOR TISSUE SEPARATION, AND OTHER PROCEDURES THAT REQUIRE A SHARP SURGICAL BLADE TO PUNCTURE OR CUT.: Brand: BARD-PARKER ® CARBON RIB-BACK BLADES

## (undated) DEVICE — TRNQT TEXT 1X18IN BLU LF DISP -- CONVERT TO ITEM 362165

## (undated) DEVICE — GARMENT,MEDLINE,DVT,INT,CALF,MED, GEN2: Brand: MEDLINE

## (undated) DEVICE — 40580 - THE PINK PAD - ADVANCED TRENDELENBURG POSITIONING KIT: Brand: 40580 - THE PINK PAD - ADVANCED TRENDELENBURG POSITIONING KIT

## (undated) DEVICE — SET ADMIN 16ML TBNG L100IN 2 Y INJ SITE IV PIGGY BK DISP

## (undated) DEVICE — SUTURE PDS II SZ 4-0 L27IN ABSRB VLT L17MM RB-1 1/2 CIR Z304H

## (undated) DEVICE — WRAP SURG W1.31XL1.34M CARD FOR PT 165-172CM THERMOWRP

## (undated) DEVICE — DEVON™ KNEE AND BODY STRAP 60" X 3" (1.5 M X 7.6 CM): Brand: DEVON

## (undated) DEVICE — SOLIDIFIER FLUID 3000 CC ABSORB

## (undated) DEVICE — SUTURE VCRL SZ 3-0 L27IN ABSRB VLT L26MM SH 1/2 CIR J316H

## (undated) DEVICE — MARYLAND JAW OPEN SEALER/DIVIDER: Brand: LIGASURE

## (undated) DEVICE — SYSTEM EVAC SMOKE LAPARSCOPIC

## (undated) DEVICE — SOLIDIFIER MEDC 1200ML -- CONVERT TO 356117

## (undated) DEVICE — Z INACTIVATING USE 2752259 STAPLER INT DIA12MM 65DEG BLK 6 ROW 10 TI STPL PRELD DISP

## (undated) DEVICE — NEEDLE HYPO 25GA L1.5IN BVL ORIENTED ECLIPSE

## (undated) DEVICE — 4-PORT MANIFOLD: Brand: NEPTUNE 2

## (undated) DEVICE — NET RETRV FB ENDOSCP 2.5MMX230 -- ROTH NET

## (undated) DEVICE — Device: Brand: BALLOON3

## (undated) DEVICE — SUT SLK 2-0SH 30IN BLK --

## (undated) DEVICE — SPONGE: SPECIALTY PEANUT XR 100/CS: Brand: MEDICAL ACTION INDUSTRIES

## (undated) DEVICE — SUTURE MCRYL SZ 4-0 L27IN ABSRB UD L19MM PS-2 1/2 CIR PRIM Y426H

## (undated) DEVICE — SUTURE PERMAHAND SZ 2-0 L30IN NONABSORBABLE BLK SH L26MM C016D

## (undated) DEVICE — TRAP SURG QUAD PARABOLA SLOT DSGN SFTY SCRN TRAPEASE

## (undated) DEVICE — SUTURE SZ 0 27IN 5/8 CIR UR-6  TAPER PT VIOLET ABSRB VICRYL J603H

## (undated) DEVICE — STERILE POLYISOPRENE POWDER-FREE SURGICAL GLOVES WITH EMOLLIENT COATING: Brand: PROTEXIS

## (undated) DEVICE — Device

## (undated) DEVICE — LAPAROSCOPIC TROCAR SLEEVE/SINGLE USE: Brand: KII® OPTICAL ACCESS SYSTEM

## (undated) DEVICE — INFECTION CONTROL KIT SYS

## (undated) DEVICE — KENDALL RADIOLUCENT FOAM MONITORING ELECTRODE RECTANGULAR SHAPE: Brand: KENDALL

## (undated) DEVICE — SPHINCTEROTOME: Brand: DREAMTOME™ RX 44

## (undated) DEVICE — DERMABOND SKIN ADH 0.7ML -- DERMABOND ADVANCED 12/BX

## (undated) DEVICE — ZINACTIVE USE 2641837 CLIP LIG M BLU TI HRT SHP WIRE HORZ 600 PER BX

## (undated) DEVICE — SKIN TEMPERATURE SENSOR: Brand: DEROYAL

## (undated) DEVICE — DRAPE,LAPAROTOMY,T,PEDI,STERILE: Brand: MEDLINE

## (undated) DEVICE — SYR 50ML SLIP TIP NSAF LF STRL --

## (undated) DEVICE — PREP SKN CHLRAPRP APL 26ML STR --

## (undated) DEVICE — BITEBLOCK ENDOSCP 60FR MAXI WHT POLYETH STURDY W/ VELC WVN

## (undated) DEVICE — SUTURE PROL SZ 6-0 L30IN NONABSORBABLE BLU L13MM RB-2 1/2 8711H

## (undated) DEVICE — GENERAL LAPAROSCOPY - SMH: Brand: MEDLINE INDUSTRIES, INC.

## (undated) DEVICE — PENCIL SMK EVAC 10 FT BLADE ELECTRD ROCKER FOR TELSCP

## (undated) DEVICE — STAPLER INT DIA5MM 25 ABSRB STRP FIX DISP FOR HERN MESH

## (undated) DEVICE — NEEDLE HYPO 18GA L1.5IN PNK S STL HUB POLYPR SHLD REG BVL

## (undated) DEVICE — ELECTRODE,RADIOTRANSLUCENT,FOAM,5PK: Brand: MEDLINE

## (undated) DEVICE — CLIP LIG L TI MTL LIG SYS 24 COUNT HORZ

## (undated) DEVICE — SUTURE N ABSRB BRAIDED 5-0 BB 30 IN 17 IN BLK PERMA HND K880H

## (undated) DEVICE — TRIPLE LUMEN ERCP CANNULA: Brand: TANDEM XL

## (undated) DEVICE — GUIDEWIRE ENDOSCP L450CM DIA0.035IN STR RND STD STIFF BILI

## (undated) DEVICE — CANN NASAL O2 CAPNOGRAPHY AD -- FILTERLINE

## (undated) DEVICE — CONTAINER SPEC 20 ML LID NEUT BUFF FORMALIN 10 % POLYPR STS

## (undated) DEVICE — SYR 3ML LL TIP 1/10ML GRAD --

## (undated) DEVICE — Device: Brand: MEDEX

## (undated) DEVICE — SYR 10ML LUER LOK 1/5ML GRAD --

## (undated) DEVICE — BAG BELONG PT PERS CLEAR HANDL

## (undated) DEVICE — DRAPE,REIN 53X77,STERILE: Brand: MEDLINE

## (undated) DEVICE — MEDI-VAC NON-CONDUCTIVE SUCTION TUBING: Brand: CARDINAL HEALTH

## (undated) DEVICE — SYR ASSEMB INFL BLLN 60ML --

## (undated) DEVICE — SUTURE VCRL SZ 3-0 L27IN ABSRB UD L26MM SH 1/2 CIR J416H

## (undated) DEVICE — SINGLE USE INJECTOR

## (undated) DEVICE — DEVICE LCK BILI RAP EXCHG OLPS --

## (undated) DEVICE — KIT COLON W/ 1.1OZ LUB AND 2 END

## (undated) DEVICE — ENDO CARRY-ON PROCEDURE KIT INCLUDES ENZYMATIC SPONGE, GAUZE, BIOHAZARD LABEL, TRAY, LUBRICANT, DIRTY SCOPE LABEL, WATER LABEL, TRAY, DRAWSTRING PAD, AND DEFENDO 4-PIECE KIT.: Brand: ENDO CARRY-ON PROCEDURE KIT

## (undated) DEVICE — SYR LR LCK 1ML GRAD NSAF 30ML --

## (undated) DEVICE — SUTURE PROL SZ 2-0 L36IN NONABSORBABLE BLU SH L26MM 1/2 CIR 8523H

## (undated) DEVICE — DRAIN CHN 19FR L0.25MM DIA6.3MM SIL RND HUBLESS FULL FLUT

## (undated) DEVICE — DRAPE,UTILTY,TAPE,15X26, 4EA/PK: Brand: MEDLINE

## (undated) DEVICE — Z DISCONTINUED NO SUB IDED SET EXTN W/ 4 W STPCOCK M SPIN LOK 36IN

## (undated) DEVICE — CATH IV AUTOGRD BC BLU 22GA 25 -- INSYTE

## (undated) DEVICE — HYPODERMIC SAFETY NEEDLE: Brand: MAGELLAN

## (undated) DEVICE — NEONATAL-ADULT SPO2 SENSOR: Brand: NELLCOR

## (undated) DEVICE — SOLUTION IRRIGATION NACL 0.9% 1000 ML FLX CONTAINER

## (undated) DEVICE — BASIN EMESIS 500CC ROSE 250/CS 60/PLT: Brand: MEDEGEN MEDICAL PRODUCTS, LLC

## (undated) DEVICE — TOWEL SURG W17XL27IN STD BLU COT NONFENESTRATED PREWASHED

## (undated) DEVICE — RETRIEVAL BALLOON CATHETER: Brand: EXTRACTOR™ PRO RX

## (undated) DEVICE — SURGICAL PROCEDURE PACK BASIN MAJ SET CUST NO CAUT

## (undated) DEVICE — TRAY CATH OD16FR SIL URIN M STATLOK STBL DEV SURSTP

## (undated) DEVICE — SURGICAL PROCEDURE KIT GEN LAPAROSCOPY LF

## (undated) DEVICE — ESOPHAGEAL/PYLORIC/COLONIC/BILIARY WIREGUIDED BALLOON DILATATION CATHETER: Brand: CRE™ PRO

## (undated) DEVICE — APPLICATOR BNDG 1MM ADH PREMIERPRO EXOFIN

## (undated) DEVICE — ESOPHAGEAL BALLOON DILATATION CATHETER: Brand: CRE FIXED WIRE

## (undated) DEVICE — NDL PRT INJ NSAF BLNT 18GX1.5 --

## (undated) DEVICE — HANDLE LT SNAP ON ULT DURABLE LENS FOR TRUMPF ALC DISPOSABLE

## (undated) DEVICE — SUTURE PROL SZ 5-0 L30IN NONABSORBABLE BLU L13MM RB-2 1/2 8710H

## (undated) DEVICE — SOLUTION IRRIG 1000ML H2O STRL BLT

## (undated) DEVICE — DRAPE XR C ARM 41X74IN LF --

## (undated) DEVICE — SYR 5ML 1/5 GRAD LL NSAF LF --

## (undated) DEVICE — KENDALL SCD EXPRESS SLEEVES, KNEE LENGTH, MEDIUM: Brand: KENDALL SCD

## (undated) DEVICE — SNARE ENDOSCP M L240CM W27MM SHTH DIA2.4MM CHN 2.8MM OVL

## (undated) DEVICE — GOWN,SIRUS,FABRNF,XL,20/CS: Brand: MEDLINE

## (undated) DEVICE — INSUFFLATION NEEDLE TO ESTABLISH PNEUMOPERITONEUM.: Brand: INSUFFLATION NEEDLE

## (undated) DEVICE — TISSUE RETRIEVAL SYSTEM: Brand: INZII RETRIEVAL SYSTEM

## (undated) DEVICE — SLIM BODY SKIN STAPLER: Brand: APPOSE ULC

## (undated) DEVICE — TUBING, SUCTION, 1/4" X 12', STRAIGHT: Brand: MEDLINE

## (undated) DEVICE — BASIC PACK: Brand: CONVERTORS

## (undated) DEVICE — VISUALIZATION SYSTEM: Brand: CLEARIFY

## (undated) DEVICE — FORCEPS BX L240CM JAW DIA2.8MM L CAP W/ NDL MIC MESH TOOTH

## (undated) DEVICE — KIT IV STRT W CHLORAPREP PD 1ML

## (undated) DEVICE — MARYLAND JAW LAPAROSCOPIC SEALER/DIVIDER COATED: Brand: LIGASURE

## (undated) DEVICE — 1200 GUARD II KIT W/5MM TUBE W/O VAC TUBE: Brand: GUARDIAN

## (undated) DEVICE — Device: Brand: SINGLE USE ASPIRATION NEEDLE

## (undated) DEVICE — VESSEL LOOPS,MINI, RED: Brand: DEVON

## (undated) DEVICE — BAG SPEC BIOHZD LF 2MIL 6X10IN -- CONVERT TO ITEM 357326

## (undated) DEVICE — SUTURE VCRL SZ 2-0 L27IN ABSRB UD L26MM SH 1/2 CIR J417H

## (undated) DEVICE — BLOCK BITE ENDOSCP AD 21 MM W/ DIL BLU LF DISP

## (undated) DEVICE — INFLATION DEVICE: Brand: ENCORE 26

## (undated) DEVICE — GUIDEWIRE ENDOSCP WRK L450CM DIA0.035IN STD SHFT STR RND

## (undated) DEVICE — KENDALL RADIOLUCENT FOAM MONITORING ELECTRODE -RECTANGULAR SHAPE: Brand: KENDALL

## (undated) DEVICE — GAUZE SPONGES,12 PLY: Brand: CURITY

## (undated) DEVICE — ELECTRODE ES 36CM LAP FLAT L HK COAT DISP CLEANCOAT

## (undated) DEVICE — (D)PREP SKN CHLRAPRP APPL 26ML -- CONVERT TO ITEM 371833

## (undated) DEVICE — BW-400L DISP SNGL-END CLEANINGBRUSH: Brand: OLYMPUS

## (undated) DEVICE — MEDI-VAC YANK SUCT HNDL W/TPRD BULBOUS TIP: Brand: CARDINAL HEALTH

## (undated) DEVICE — DRAIN SURG PENROSE 0.5X18 IN CLOSED WND DRAINAGE PREM SIL

## (undated) DEVICE — BITE BLK ENDOSCP AD 54FR GRN POLYETH ENDOSCP W STRP SLD

## (undated) DEVICE — SUT PROL 2-0 30IN SH BLU --

## (undated) DEVICE — TUBING HYDR IRR --

## (undated) DEVICE — Z DISCONTINUED PER MEDLINE LINE GAS SAMPLING O2/CO2 LNG AD 13 FT NSL W/ TBNG FILTERLINE

## (undated) DEVICE — COVER LT HNDL PLAS RIG 1 PER PK

## (undated) DEVICE — NDL FLTR TIP 5 MIC 18GX1.5IN --

## (undated) DEVICE — SUTURE MCRYL SZ 3-0 L27IN ABSRB UD L19MM PS-2 3/8 CIR PRIM Y427H

## (undated) DEVICE — PREP SKN CHLRAPRP SNGL 1.75ML --

## (undated) DEVICE — TROCAR: Brand: KII® OPTICAL ACCESS SYSTEM

## (undated) DEVICE — SUTURE N ABSRB MONOFILAMENT 4-0 RB1 30 IN BLU PROLENE 8871H

## (undated) DEVICE — DBD-PACK,LAPAROTOMY,2 REINFORCED GOWNS: Brand: MEDLINE

## (undated) DEVICE — TOWEL 4 PLY TISS 19X30 SUE WHT

## (undated) DEVICE — TROCAR: Brand: KII® SLEEVE

## (undated) DEVICE — CATH IV AUTOGRD BC PNK 20GA 25 -- INSYTE

## (undated) DEVICE — DECANTER BAG 9": Brand: MEDLINE INDUSTRIES, INC.

## (undated) DEVICE — Z CONVERTED USE 2274299 CUFF BLD PRESSURE LNG MED AD 25-35 CM ARM FLEXIPORT DISP

## (undated) DEVICE — SOL INJ SOD CL 0.9% 500ML BG --

## (undated) DEVICE — STAPLER INT 12MM 60MM CART SHT NEW KNF BLDE W/ EVERY FIRING

## (undated) DEVICE — YANKAUER,TAPERED BULBOUS TIP,W/O VENT: Brand: MEDLINE

## (undated) DEVICE — Device: Brand: MEDICAL ACTION INDUSTRIES

## (undated) DEVICE — DRAPE FLD WRM W44XL66IN C6L FOR INTRATEMP SYS THERMABASIN

## (undated) DEVICE — ARTICULATING RELOAD WITH TRI-STAPLE TECHNOLOGY: Brand: ENDO GIA

## (undated) DEVICE — BASIN EMSIS 16OZ GRAPHITE PLAS KID SHP MOLD GRAD FOR ORAL

## (undated) DEVICE — Z DISCONTINUED USE 2425483 (LOW STOCK PER MEDLINE) TAPE UMB L18IN DIA1/8IN WHT COT NONABSORBABLE W/O NDL FOR

## (undated) DEVICE — STAPLER INT HERN BLK TI W/ 4.8MM STPL DISP MULTFI VERSATACK

## (undated) DEVICE — GARMENT,MEDLINE,DVT,INT,CALF,LG, GEN2: Brand: MEDLINE

## (undated) DEVICE — SUTURE VCRL SZ 1 L36IN ABSRB VLT L48MM CTX 1/2 CIR J371H

## (undated) DEVICE — LIGHT HANDLE: Brand: DEVON

## (undated) DEVICE — DRAPE PRB US TRNSDCR 6X96IN --

## (undated) DEVICE — SINGLE-USE BIOPSY FORCEPS: Brand: SPYBITE MAX

## (undated) DEVICE — BW-412T DISP COMBO CLEANING BRUSH: Brand: SINGLE USE COMBINATION CLEANING BRUSH

## (undated) DEVICE — SUT ETHLN 2-0 18IN FS BLK --

## (undated) DEVICE — SUTURE PERMAHAND SZ 2-0 L30IN NONABSORBABLE BLK SILK W/O A305H

## (undated) DEVICE — WRISTBAND ID AD W1XL11.5IN RED POLY ALRG PREPRINTED PERM

## (undated) DEVICE — NEEDLE ASPIR 19GA CHN 2.8MM SHTH DIA1.73MM CO CHROM ENDOSCP

## (undated) DEVICE — GLOVE SURG SZ 8 L12IN FNGR THK79MIL GRN LTX FREE

## (undated) DEVICE — BLOCK BITE ENDO --

## (undated) DEVICE — INSULATED BLADE ELECTRODE: Brand: EDGE

## (undated) DEVICE — SUTURE MCRYL SZ 4-0 L18IN ABSRB UD L19MM PS-2 3/8 CIR PRIM Y496G

## (undated) DEVICE — SUTURE V-LOC 90 3-0 L6IN ABSRB UD CV-23 L17MM 1/2 CIR VLOCM1904

## (undated) DEVICE — GLOVE ORANGE PI 7 1/2   MSG9075

## (undated) DEVICE — SNARE ENDOSCP M L240CM W27MM SHTH DIA2.4MM CHN 2.8MM HEX

## (undated) DEVICE — CLIP INT SM WIDE RED TI TRNSVRS GRV CHEVRON SHP W PRECIS

## (undated) DEVICE — SUTURE PERMAHAND SZ 3-0 L18IN NONABSORBABLE BLK L26MM SH C013D

## (undated) DEVICE — ELECTRODE BLDE L4IN NONINSULATED EDGE

## (undated) DEVICE — ACCESS AND DELIVERY CATHETER: Brand: SPYSCOPE™ DS II

## (undated) DEVICE — SUT SLK 3-0 30IN SH BLK --

## (undated) DEVICE — BLADE ELECTRODE: Brand: EDGE

## (undated) DEVICE — NEEDLE HYPO 22GA L1.5IN BLK S STL HUB POLYPR SHLD REG BVL

## (undated) DEVICE — CLICKLINE SCISSORS INSERT: Brand: CLICKLINE

## (undated) DEVICE — KIT COMPLIANCE W ENDOGLDE + 11 NO BRSH ENDOKT

## (undated) DEVICE — SYRINGE MED 20ML STD CLR PLAS LUERLOCK TIP N CTRL DISP

## (undated) DEVICE — SOLUTION IV 500ML 0.9% SOD CHL FLX CONT

## (undated) DEVICE — DISSECTOR RMFG CURVED 5MM --

## (undated) DEVICE — SUTURE PDS II SZ 1 L96IN ABSRB VLT TP-1 L65MM 1/2 CIR Z880G

## (undated) DEVICE — SPONGE LAP 18X18IN STRL -- 5/PK

## (undated) DEVICE — ELEVIEW

## (undated) DEVICE — SET EXTN TBNG L BOR 4 W STPCOCK ST 32IN PRIMING VOL 6ML

## (undated) DEVICE — AGENT HEMSTAT W2XL14IN OXIDIZED REGENERATED CELOS ABSRB FOR

## (undated) DEVICE — SUTURE PDS II SZ 2-0 L27IN ABSRB VLT L26MM CT-2 1/2 CIR Z333H